# Patient Record
Sex: MALE | Race: WHITE | Employment: OTHER | URBAN - NONMETROPOLITAN AREA
[De-identification: names, ages, dates, MRNs, and addresses within clinical notes are randomized per-mention and may not be internally consistent; named-entity substitution may affect disease eponyms.]

---

## 2019-05-17 ENCOUNTER — HOSPITAL ENCOUNTER (EMERGENCY)
Age: 59
Discharge: HOME | End: 2019-05-17
Attending: EMERGENCY MEDICINE
Payer: COMMERCIAL

## 2019-05-17 ENCOUNTER — APPOINTMENT (OUTPATIENT)
Dept: GENERAL RADIOLOGY | Age: 59
End: 2019-05-17
Payer: COMMERCIAL

## 2019-05-17 VITALS
SYSTOLIC BLOOD PRESSURE: 166 MMHG | TEMPERATURE: 97.8 F | DIASTOLIC BLOOD PRESSURE: 81 MMHG | BODY MASS INDEX: 44.48 KG/M2 | RESPIRATION RATE: 16 BRPM | OXYGEN SATURATION: 96 % | HEIGHT: 65 IN | HEART RATE: 90 BPM | WEIGHT: 267 LBS

## 2019-05-17 DIAGNOSIS — R03.0 ELEVATED BLOOD PRESSURE READING: ICD-10-CM

## 2019-05-17 DIAGNOSIS — J40 WHEEZY BRONCHITIS: Primary | ICD-10-CM

## 2019-05-17 DIAGNOSIS — Z72.0 TOBACCO ABUSE: ICD-10-CM

## 2019-05-17 LAB
A/G RATIO: 1.2 (ref 1.1–2.2)
ALBUMIN SERPL-MCNC: 3.6 G/DL (ref 3.4–5)
ALP BLD-CCNC: 77 U/L (ref 40–129)
ALT SERPL-CCNC: 29 U/L (ref 10–40)
ANION GAP SERPL CALCULATED.3IONS-SCNC: 8 MMOL/L (ref 3–16)
AST SERPL-CCNC: 22 U/L (ref 15–37)
BASOPHILS ABSOLUTE: 0.1 K/UL (ref 0–0.2)
BASOPHILS RELATIVE PERCENT: 0.9 %
BILIRUB SERPL-MCNC: 0.5 MG/DL (ref 0–1)
BUN BLDV-MCNC: 17 MG/DL (ref 7–20)
CALCIUM SERPL-MCNC: 9 MG/DL (ref 8.3–10.6)
CHLORIDE BLD-SCNC: 105 MMOL/L (ref 99–110)
CO2: 28 MMOL/L (ref 21–32)
CREAT SERPL-MCNC: 0.8 MG/DL (ref 0.9–1.3)
EKG ATRIAL RATE: 77 BPM
EKG DIAGNOSIS: NORMAL
EKG P AXIS: 48 DEGREES
EKG P-R INTERVAL: 174 MS
EKG Q-T INTERVAL: 366 MS
EKG QRS DURATION: 74 MS
EKG QTC CALCULATION (BAZETT): 414 MS
EKG R AXIS: -49 DEGREES
EKG T AXIS: 39 DEGREES
EKG VENTRICULAR RATE: 77 BPM
EOSINOPHILS ABSOLUTE: 0.1 K/UL (ref 0–0.6)
EOSINOPHILS RELATIVE PERCENT: 1 %
GFR AFRICAN AMERICAN: >60
GFR NON-AFRICAN AMERICAN: >60
GLOBULIN: 3.1 G/DL
GLUCOSE BLD-MCNC: 101 MG/DL (ref 70–99)
HCT VFR BLD CALC: 49.8 % (ref 40.5–52.5)
HEMOGLOBIN: 16.5 G/DL (ref 13.5–17.5)
LYMPHOCYTES ABSOLUTE: 2.1 K/UL (ref 1–5.1)
LYMPHOCYTES RELATIVE PERCENT: 17.2 %
MCH RBC QN AUTO: 30.5 PG (ref 26–34)
MCHC RBC AUTO-ENTMCNC: 33.1 G/DL (ref 31–36)
MCV RBC AUTO: 92.2 FL (ref 80–100)
MONOCYTES ABSOLUTE: 0.7 K/UL (ref 0–1.3)
MONOCYTES RELATIVE PERCENT: 5.8 %
NEUTROPHILS ABSOLUTE: 9.1 K/UL (ref 1.7–7.7)
NEUTROPHILS RELATIVE PERCENT: 75.1 %
PDW BLD-RTO: 13.7 % (ref 12.4–15.4)
PLATELET # BLD: 198 K/UL (ref 135–450)
PMV BLD AUTO: 8.3 FL (ref 5–10.5)
POTASSIUM REFLEX MAGNESIUM: 4.2 MMOL/L (ref 3.5–5.1)
PRO-BNP: 64 PG/ML (ref 0–124)
RBC # BLD: 5.4 M/UL (ref 4.2–5.9)
SODIUM BLD-SCNC: 141 MMOL/L (ref 136–145)
TOTAL PROTEIN: 6.7 G/DL (ref 6.4–8.2)
TROPONIN: <0.01 NG/ML
WBC # BLD: 12.2 K/UL (ref 4–11)

## 2019-05-17 PROCEDURE — 6370000000 HC RX 637 (ALT 250 FOR IP): Performed by: EMERGENCY MEDICINE

## 2019-05-17 PROCEDURE — 36415 COLL VENOUS BLD VENIPUNCTURE: CPT

## 2019-05-17 PROCEDURE — 96374 THER/PROPH/DIAG INJ IV PUSH: CPT

## 2019-05-17 PROCEDURE — 99285 EMERGENCY DEPT VISIT HI MDM: CPT

## 2019-05-17 PROCEDURE — 84484 ASSAY OF TROPONIN QUANT: CPT

## 2019-05-17 PROCEDURE — 6360000002 HC RX W HCPCS: Performed by: EMERGENCY MEDICINE

## 2019-05-17 PROCEDURE — 93010 ELECTROCARDIOGRAM REPORT: CPT | Performed by: INTERNAL MEDICINE

## 2019-05-17 PROCEDURE — 93005 ELECTROCARDIOGRAM TRACING: CPT | Performed by: EMERGENCY MEDICINE

## 2019-05-17 PROCEDURE — 85025 COMPLETE CBC W/AUTO DIFF WBC: CPT

## 2019-05-17 PROCEDURE — 71046 X-RAY EXAM CHEST 2 VIEWS: CPT

## 2019-05-17 PROCEDURE — 80053 COMPREHEN METABOLIC PANEL: CPT

## 2019-05-17 PROCEDURE — 83880 ASSAY OF NATRIURETIC PEPTIDE: CPT

## 2019-05-17 RX ORDER — ALBUTEROL SULFATE 90 UG/1
2 AEROSOL, METERED RESPIRATORY (INHALATION) EVERY 6 HOURS PRN
Qty: 1 INHALER | Refills: 0 | Status: SHIPPED | OUTPATIENT
Start: 2019-05-17 | End: 2022-04-07 | Stop reason: SDUPTHER

## 2019-05-17 RX ORDER — METHYLPREDNISOLONE SODIUM SUCCINATE 125 MG/2ML
125 INJECTION, POWDER, LYOPHILIZED, FOR SOLUTION INTRAMUSCULAR; INTRAVENOUS ONCE
Status: COMPLETED | OUTPATIENT
Start: 2019-05-17 | End: 2019-05-17

## 2019-05-17 RX ORDER — IPRATROPIUM BROMIDE AND ALBUTEROL SULFATE 2.5; .5 MG/3ML; MG/3ML
2 SOLUTION RESPIRATORY (INHALATION) ONCE
Status: COMPLETED | OUTPATIENT
Start: 2019-05-17 | End: 2019-05-17

## 2019-05-17 RX ORDER — PREDNISONE 10 MG/1
TABLET ORAL
Qty: 30 TABLET | Refills: 0 | Status: SHIPPED | OUTPATIENT
Start: 2019-05-17 | End: 2019-07-10

## 2019-05-17 RX ADMIN — METHYLPREDNISOLONE SODIUM SUCCINATE 125 MG: 125 INJECTION, POWDER, FOR SOLUTION INTRAMUSCULAR; INTRAVENOUS at 12:53

## 2019-05-17 RX ADMIN — IPRATROPIUM BROMIDE AND ALBUTEROL SULFATE 2 AMPULE: .5; 3 SOLUTION RESPIRATORY (INHALATION) at 12:54

## 2019-05-17 ASSESSMENT — PAIN DESCRIPTION - DESCRIPTORS: DESCRIPTORS: PRESSURE

## 2019-05-17 ASSESSMENT — PAIN SCALES - GENERAL
PAINLEVEL_OUTOF10: 1
PAINLEVEL_OUTOF10: 0

## 2019-05-17 ASSESSMENT — PAIN DESCRIPTION - PAIN TYPE: TYPE: ACUTE PAIN

## 2019-05-17 ASSESSMENT — PAIN DESCRIPTION - LOCATION: LOCATION: CHEST

## 2019-05-17 NOTE — ED PROVIDER NOTES
[facility] DEPART[facility]      CHIEF COMPLAINT  Shortness of Breath (pt c/o SOB x4 weeks, states he has been theodora his S/O's breathing Tx and Z-pack but it has not helped, pt having audible wheezes, denies previous Hx COPD or CHF)       HISTORY OF PRESENT ILLNESS  Jayden Csaillas is a 66 y.o. male  who presents to the ED complaining of shortness of breath. There is a family history of CHF and he is worried that possibly he may have this. He has had some slight swelling in his legs. He states that it feels like his lungs are tight like something sitting on his chest.  No other chest pain. Patient states that he does smoke 2 packs a day of cigarettes. No known history of asthma or COPD. He has been coughing up green sputum. He has been more tired than usual.  He has used his wife's inhaler as well as took a Z-Brigido that his friend had but did not have any significant improvement after the antibiotic so came in today for further evaluation. Symptoms have been worsening over the past several weeks. No nausea or vomiting. No known fevers. No other complaints, modifying factors or associated symptoms. I have reviewed the following from the nursing documentation. Past Medical History:   Diagnosis Date    Kidney stone      Past Surgical History:   Procedure Laterality Date    CHOLECYSTECTOMY      CYSTOSCOPY  03/16/2011    cystoscopy left ureteroscopy, left retrograde, double J stent placement     History reviewed. No pertinent family history.   Social History     Socioeconomic History    Marital status:      Spouse name: Not on file    Number of children: Not on file    Years of education: Not on file    Highest education level: Not on file   Occupational History    Not on file   Social Needs    Financial resource strain: Not on file    Food insecurity:     Worry: Not on file     Inability: Not on file    Transportation needs:     Medical: Not on file     Non-medical: Not on file Tobacco Use    Smoking status: Current Every Day Smoker     Packs/day: 2.00     Years: 35.00     Pack years: 70.00    Smokeless tobacco: Never Used   Substance and Sexual Activity    Alcohol use: No    Drug use: No    Sexual activity: Not on file   Lifestyle    Physical activity:     Days per week: Not on file     Minutes per session: Not on file    Stress: Not on file   Relationships    Social connections:     Talks on phone: Not on file     Gets together: Not on file     Attends Pentecostalism service: Not on file     Active member of club or organization: Not on file     Attends meetings of clubs or organizations: Not on file     Relationship status: Not on file    Intimate partner violence:     Fear of current or ex partner: Not on file     Emotionally abused: Not on file     Physically abused: Not on file     Forced sexual activity: Not on file   Other Topics Concern    Not on file   Social History Narrative    Not on file     No current facility-administered medications for this encounter. Current Outpatient Medications   Medication Sig Dispense Refill    albuterol sulfate HFA (PROVENTIL HFA) 108 (90 Base) MCG/ACT inhaler Inhale 2 puffs into the lungs every 6 hours as needed for Wheezing 1 Inhaler 0    predniSONE (DELTASONE) 10 MG tablet Take 4 tablets daily x3 days by mouth, followed by 3 tablets daily x3 days by mouth, then 2 tablets daily x3 days by mouth, and then 1 tablet daily by mouth until gone. 30 tablet 0     Allergies   Allergen Reactions    Codeine Itching    Dilaudid [Hydromorphone Hcl]        REVIEW OF SYSTEMS  10 systems reviewed, pertinent positives per HPI otherwise noted to be negative. PHYSICAL EXAM  BP (!) 162/98   Pulse 70   Temp 97.8 °F (36.6 °C) (Oral)   Resp 18   Ht 5' 5\" (1.651 m)   Wt 267 lb (121.1 kg)   SpO2 100%   BMI 44.43 kg/m²    GENERAL APPEARANCE: Awake and alert. Cooperative. No acute distress. HENT: Normocephalic. Atraumatic.  Mucous membranes are moist.  No drooling or stridor. NECK: Supple. EYES: PERRL. EOM's grossly intact. HEART/CHEST: RRR. No murmurs. 2+ radial pulses bilaterally. LUNGS: Respirations unlabored. Diffuse expiratory wheezes throughout. Good air exchange. Speaking comfortably in full sentences. ABDOMEN: No tenderness. Soft. Non-distended. No masses. No organomegaly. No guarding or rebound. MUSCULOSKELETAL: Trace bilateral lower extremity edema. Compartments soft. No deformity. No tenderness in the extremities. All extremities neurovascularly intact. SKIN: Warm and dry. No acute rashes. NEUROLOGICAL: Alert and oriented. CN's 2-12 intact. No gross facial drooping. No gross focal deficits. PSYCHIATRIC: Normal mood and affect. LABS  I have reviewed all labs for this visit.    Results for orders placed or performed during the hospital encounter of 05/17/19   CBC Auto Differential   Result Value Ref Range    WBC 12.2 (H) 4.0 - 11.0 K/uL    RBC 5.40 4.20 - 5.90 M/uL    Hemoglobin 16.5 13.5 - 17.5 g/dL    Hematocrit 49.8 40.5 - 52.5 %    MCV 92.2 80.0 - 100.0 fL    MCH 30.5 26.0 - 34.0 pg    MCHC 33.1 31.0 - 36.0 g/dL    RDW 13.7 12.4 - 15.4 %    Platelets 379 770 - 024 K/uL    MPV 8.3 5.0 - 10.5 fL    Neutrophils % 75.1 %    Lymphocytes % 17.2 %    Monocytes % 5.8 %    Eosinophils % 1.0 %    Basophils % 0.9 %    Neutrophils # 9.1 (H) 1.7 - 7.7 K/uL    Lymphocytes # 2.1 1.0 - 5.1 K/uL    Monocytes # 0.7 0.0 - 1.3 K/uL    Eosinophils # 0.1 0.0 - 0.6 K/uL    Basophils # 0.1 0.0 - 0.2 K/uL   Comprehensive Metabolic Panel w/ Reflex to MG   Result Value Ref Range    Sodium 141 136 - 145 mmol/L    Potassium reflex Magnesium 4.2 3.5 - 5.1 mmol/L    Chloride 105 99 - 110 mmol/L    CO2 28 21 - 32 mmol/L    Anion Gap 8 3 - 16    Glucose 101 (H) 70 - 99 mg/dL    BUN 17 7 - 20 mg/dL    CREATININE 0.8 (L) 0.9 - 1.3 mg/dL    GFR Non-African American >60 >60    GFR African American >60 >60    Calcium 9.0 8.3 - 10.6 mg/dL    Total Protein 6.7 6.4 - 8.2 g/dL    Alb 3.6 3.4 - 5.0 g/dL    Albumin/Globulin Ratio 1.2 1.1 - 2.2    Total Bilirubin 0.5 0.0 - 1.0 mg/dL    Alkaline Phosphatase 77 40 - 129 U/L    ALT 29 10 - 40 U/L    AST 22 15 - 37 U/L    Globulin 3.1 g/dL   Troponin   Result Value Ref Range    Troponin <0.01 <0.01 ng/mL   Brain Natriuretic Peptide   Result Value Ref Range    Pro-BNP 64 0 - 124 pg/mL   EKG 12 Lead   Result Value Ref Range    Ventricular Rate 77 BPM    Atrial Rate 77 BPM    P-R Interval 174 ms    QRS Duration 74 ms    Q-T Interval 366 ms    QTc Calculation (Bazett) 414 ms    P Axis 48 degrees    R Axis -49 degrees    T Axis 39 degrees    Diagnosis       Normal sinus rhythmLeft axis deviationLow voltage QRSCannot rule out Inferior infarct , age undeterminedAbnormal ECG       ECG  The Ekg interpreted by me shows  normal sinus rhythm with a rate of 77  Axis is   Normal  QTc is  normal  Intervals and Durations are unremarkable. ST Segments: nonspecific changes  No prior ECG available for comparison. RADIOLOGY  Xr Chest Standard (2 Vw)    Result Date: 5/17/2019  EXAMINATION: TWO XRAY VIEWS OF THE CHEST 5/17/2019 1:05 pm COMPARISON: None. HISTORY: ORDERING SYSTEM PROVIDED HISTORY: dyspnea, wheezing TECHNOLOGIST PROVIDED HISTORY: Reason for exam:->dyspnea, wheezing Ordering Physician Provided Reason for Exam: short of breath for couple weeks, got progressively worse Acuity: Unknown Type of Exam: Initial FINDINGS: There is mild chronic pulmonary change. There is no acute consolidation or effusion. There is no pneumothorax. The mediastinal structures are unremarkable. The upper abdomen is unremarkable. The extrathoracic soft tissues are unremarkable. Chronic pulmonary change without acute cardiopulmonary process. ED COURSE/MDM  Patient seen and evaluated. Old records reviewed. Labs and imaging reviewed and results discussed with patient.       Patient presenting for evaluation of shortness of breath and exam is consistent with wheezing and likely bronchitis or component of underlying COPD that was previously not diagnosed. He has a significant 2 pack per day smoking history currently. Smoking cessation discussed. He also has a little blood pressure but no evidence of end-organ damage. EKG without acute ischemic changes. Chest x-ray with chronic pulmonary changes but no acute finding or confluent infiltrate noted. No pulmonary edema or evidence of CHF. At this time, patient has been given steroids and breathing treatments and has no hypoxia or significant increased work of breathing and he feel was appropriate to discharge home with close outpatient follow-up with his PCP for recheck. Patient verbalized understanding and agreement of this plan and all questions answered prior to discharge. I estimate there is LOW risk for PULMONARY EMBOLISM, ACUTE CORONARY SYNDROME, OR THORACIC AORTIC DISSECTION, thus I consider the discharge disposition reasonable. Ajit Hanson and I have discussed the diagnosis and risks, and we agree with discharging home to follow-up with their primary doctor. We also discussed returning to the Emergency Department immediately if new or worsening symptoms occur. We have discussed the symptoms which are most concerning (e.g., bloody sputum, fever, worsening pain or shortness of breath, vomiting) that necessitate immediate return. During the patient's ED course, the patient was given:  Medications   ipratropium-albuterol (DUONEB) nebulizer solution 2 ampule (2 ampules Inhalation Given 5/17/19 1254)   methylPREDNISolone sodium (SOLU-MEDROL) injection 125 mg (125 mg Intravenous Given 5/17/19 1253)        CLINICAL IMPRESSION  1. Wheezy bronchitis    2. Tobacco abuse    3. Elevated blood pressure reading        Blood pressure (!) 162/98, pulse 70, temperature 97.8 °F (36.6 °C), temperature source Oral, resp.  rate 18, height 5' 5\" (1.651 m), weight 267 lb (121.1 kg), SpO2 100 %. DISPOSITION  La Nena Nath was discharged to home in stable condition. Patient was given scripts for the following medications. I counseled patient how to take these medications. New Prescriptions    ALBUTEROL SULFATE HFA (PROVENTIL HFA) 108 (90 BASE) MCG/ACT INHALER    Inhale 2 puffs into the lungs every 6 hours as needed for Wheezing    PREDNISONE (DELTASONE) 10 MG TABLET    Take 4 tablets daily x3 days by mouth, followed by 3 tablets daily x3 days by mouth, then 2 tablets daily x3 days by mouth, and then 1 tablet daily by mouth until gone. Follow-up with:  Neli Kelly, APRN - CNP  [address] [facility],[address]  [phone]    Schedule an appointment as soon as possible for a visit in 2 days  For recheck      DISCLAIMER: This chart was created using Dragon dictation software. Efforts were made by me to ensure accuracy, however some errors may be present due to limitations of this technology and occasionally words are not transcribed correctly.         Hali Calzada MD  05/17/19 0037

## 2019-07-10 ENCOUNTER — OFFICE VISIT (OUTPATIENT)
Dept: FAMILY MEDICINE CLINIC | Age: 59
End: 2019-07-10
Payer: COMMERCIAL

## 2019-07-10 VITALS
DIASTOLIC BLOOD PRESSURE: 64 MMHG | SYSTOLIC BLOOD PRESSURE: 104 MMHG | OXYGEN SATURATION: 96 % | HEIGHT: 65 IN | HEART RATE: 86 BPM | WEIGHT: 277 LBS | BODY MASS INDEX: 46.15 KG/M2

## 2019-07-10 DIAGNOSIS — R73.9 HYPERGLYCEMIA: ICD-10-CM

## 2019-07-10 DIAGNOSIS — R06.00 DYSPNEA, UNSPECIFIED TYPE: ICD-10-CM

## 2019-07-10 DIAGNOSIS — F10.11 HISTORY OF ALCOHOL ABUSE: ICD-10-CM

## 2019-07-10 DIAGNOSIS — F32.9 REACTIVE DEPRESSION: Primary | ICD-10-CM

## 2019-07-10 DIAGNOSIS — Z12.5 SCREENING FOR PROSTATE CANCER: ICD-10-CM

## 2019-07-10 DIAGNOSIS — Z72.0 TOBACCO USE: ICD-10-CM

## 2019-07-10 DIAGNOSIS — F41.9 ANXIETY: ICD-10-CM

## 2019-07-10 DIAGNOSIS — R53.83 FATIGUE, UNSPECIFIED TYPE: ICD-10-CM

## 2019-07-10 DIAGNOSIS — N52.9 ERECTILE DYSFUNCTION, UNSPECIFIED ERECTILE DYSFUNCTION TYPE: ICD-10-CM

## 2019-07-10 DIAGNOSIS — R60.9 EDEMA, UNSPECIFIED TYPE: ICD-10-CM

## 2019-07-10 LAB
BASOPHILS ABSOLUTE: 0 K/UL (ref 0–0.2)
BASOPHILS RELATIVE PERCENT: 0.4 %
EOSINOPHILS ABSOLUTE: 0.2 K/UL (ref 0–0.6)
EOSINOPHILS RELATIVE PERCENT: 1.8 %
HCT VFR BLD CALC: 51.3 % (ref 40.5–52.5)
HEMOGLOBIN: 17 G/DL (ref 13.5–17.5)
LYMPHOCYTES ABSOLUTE: 2.1 K/UL (ref 1–5.1)
LYMPHOCYTES RELATIVE PERCENT: 21.4 %
MCH RBC QN AUTO: 30.8 PG (ref 26–34)
MCHC RBC AUTO-ENTMCNC: 33.1 G/DL (ref 31–36)
MCV RBC AUTO: 93.1 FL (ref 80–100)
MONOCYTES ABSOLUTE: 0.6 K/UL (ref 0–1.3)
MONOCYTES RELATIVE PERCENT: 6.5 %
NEUTROPHILS ABSOLUTE: 6.9 K/UL (ref 1.7–7.7)
NEUTROPHILS RELATIVE PERCENT: 69.9 %
PDW BLD-RTO: 14.1 % (ref 12.4–15.4)
PLATELET # BLD: 211 K/UL (ref 135–450)
PMV BLD AUTO: 8.6 FL (ref 5–10.5)
RBC # BLD: 5.51 M/UL (ref 4.2–5.9)
WBC # BLD: 9.8 K/UL (ref 4–11)

## 2019-07-10 PROCEDURE — 99204 OFFICE O/P NEW MOD 45 MIN: CPT | Performed by: NURSE PRACTITIONER

## 2019-07-10 PROCEDURE — 36415 COLL VENOUS BLD VENIPUNCTURE: CPT | Performed by: NURSE PRACTITIONER

## 2019-07-10 RX ORDER — ESCITALOPRAM OXALATE 10 MG/1
10 TABLET ORAL DAILY
Qty: 30 TABLET | Refills: 1 | Status: SHIPPED | OUTPATIENT
Start: 2019-07-10 | End: 2019-08-23 | Stop reason: ALTCHOICE

## 2019-07-10 NOTE — PROGRESS NOTES
Problems Sister      Past Surgical History:   Procedure Laterality Date    CHOLECYSTECTOMY      CYSTOSCOPY  03/16/2011    cystoscopy left ureteroscopy, left retrograde, double J stent placement     Social History     Socioeconomic History    Marital status:      Spouse name: Not on file    Number of children: Not on file    Years of education: Not on file    Highest education level: Not on file   Occupational History    Not on file   Social Needs    Financial resource strain: Not on file    Food insecurity:     Worry: Not on file     Inability: Not on file    Transportation needs:     Medical: Not on file     Non-medical: Not on file   Tobacco Use    Smoking status: Current Every Day Smoker     Packs/day: 2.00     Years: 35.00     Pack years: 70.00     Start date: 1974    Smokeless tobacco: Never Used   Substance and Sexual Activity    Alcohol use: No    Drug use: No    Sexual activity: Not on file   Lifestyle    Physical activity:     Days per week: Not on file     Minutes per session: Not on file    Stress: Not on file   Relationships    Social connections:     Talks on phone: Not on file     Gets together: Not on file     Attends Taoist service: Not on file     Active member of club or organization: Not on file     Attends meetings of clubs or organizations: Not on file     Relationship status: Not on file    Intimate partner violence:     Fear of current or ex partner: Not on file     Emotionally abused: Not on file     Physically abused: Not on file     Forced sexual activity: Not on file   Other Topics Concern    Not on file   Social History Narrative    Not on file     Current Outpatient Medications   Medication Sig Dispense Refill    albuterol sulfate HFA (PROVENTIL HFA) 108 (90 Base) MCG/ACT inhaler Inhale 2 puffs into the lungs every 6 hours as needed for Wheezing 1 Inhaler 0     No current facility-administered medications for this visit.          No changes in past medical

## 2019-07-11 LAB
A/G RATIO: 1.8 (ref 1.1–2.2)
ALBUMIN SERPL-MCNC: 4.1 G/DL (ref 3.4–5)
ALP BLD-CCNC: 74 U/L (ref 40–129)
ALT SERPL-CCNC: 28 U/L (ref 10–40)
ANION GAP SERPL CALCULATED.3IONS-SCNC: 11 MMOL/L (ref 3–16)
AST SERPL-CCNC: 19 U/L (ref 15–37)
BILIRUB SERPL-MCNC: 0.5 MG/DL (ref 0–1)
BUN BLDV-MCNC: 14 MG/DL (ref 7–20)
CALCIUM SERPL-MCNC: 9.2 MG/DL (ref 8.3–10.6)
CHLORIDE BLD-SCNC: 103 MMOL/L (ref 99–110)
CO2: 28 MMOL/L (ref 21–32)
CREAT SERPL-MCNC: 0.8 MG/DL (ref 0.9–1.3)
ESTIMATED AVERAGE GLUCOSE: 125.5 MG/DL
FOLATE: 5.33 NG/ML (ref 4.78–24.2)
GFR AFRICAN AMERICAN: >60
GFR NON-AFRICAN AMERICAN: >60
GLOBULIN: 2.3 G/DL
GLUCOSE BLD-MCNC: 92 MG/DL (ref 70–99)
HBA1C MFR BLD: 6 %
POTASSIUM SERPL-SCNC: 4.4 MMOL/L (ref 3.5–5.1)
PROSTATE SPECIFIC ANTIGEN: 1.48 NG/ML (ref 0–4)
SODIUM BLD-SCNC: 142 MMOL/L (ref 136–145)
TOTAL PROTEIN: 6.4 G/DL (ref 6.4–8.2)
TSH SERPL DL<=0.05 MIU/L-ACNC: 0.9 UIU/ML (ref 0.27–4.2)
VITAMIN B-12: 547 PG/ML (ref 211–911)

## 2019-07-19 ENCOUNTER — HOSPITAL ENCOUNTER (OUTPATIENT)
Dept: NON INVASIVE DIAGNOSTICS | Age: 59
Discharge: HOME | End: 2019-07-19
Payer: COMMERCIAL

## 2019-07-19 DIAGNOSIS — R60.9 EDEMA, UNSPECIFIED TYPE: ICD-10-CM

## 2019-07-19 DIAGNOSIS — R53.83 FATIGUE, UNSPECIFIED TYPE: ICD-10-CM

## 2019-07-19 DIAGNOSIS — R06.00 DYSPNEA, UNSPECIFIED TYPE: ICD-10-CM

## 2019-07-19 LAB
LV EF: 55 %
LVEF MODALITY: NORMAL

## 2019-07-19 PROCEDURE — 93306 TTE W/DOPPLER COMPLETE: CPT

## 2019-07-23 ASSESSMENT — ENCOUNTER SYMPTOMS
SHORTNESS OF BREATH: 1
EYES NEGATIVE: 1
GASTROINTESTINAL NEGATIVE: 1

## 2019-08-07 PROBLEM — R06.02 SOB (SHORTNESS OF BREATH): Status: ACTIVE | Noted: 2019-08-07

## 2019-08-07 NOTE — PROGRESS NOTES
Abdomen:  · No masses or tenderness  · Liver/Spleen: No Abnormalities Noted  Neurological/Psychiatric:  · Alert and oriented in all spheres  · Moves all extremities well  · Exhibits normal gait balance and coordination  · No abnormalities of mood, affect, memory, mentation, or behavior are noted    EKG 5/17/19  Normal sinus rhythmLeft axis deviationLow voltage QRSCannot rule out Inferior infarct , age undetermined    ECHO 7/19/19  Summary   Left ventricular systolic function is normal with ejection fraction   estimated at 55%. No regional wall motion abnormalities. There is mild concentric left ventricular hypertrophy. Grade I diastolic dysfunction with normal left ventricular filling pressure. Mild mitral regurgitation. Assessment:   Chest pain - atypical. Suspect GI  Fatigue - suspect sleep apnea  Smoking - cessation discussed     Plan:  Stress echo   Follow up PCP   Sleep study for fatigue   Continue current medications   Regular exercise and following a healthy diet encouraged   Will call with testing    This note was scribed in the presence of Dr. Lasha Bridges MD by Margo Hart RN     The scribes docuementation has been prepared under my direction and personally reviewed by me in its entirety. I confirm that the note above accurately reflects all work, treatment, procedures, and medical decision making performed by me. Dr. Lasha Bridges MD    Thank you for allowing me to participate in the care of this individual.      Lacy Posey.  Lamberto Velasquez M.D., [facility]

## 2019-08-08 ENCOUNTER — OFFICE VISIT (OUTPATIENT)
Dept: CARDIOLOGY CLINIC | Age: 59
End: 2019-08-08
Payer: COMMERCIAL

## 2019-08-08 VITALS
HEART RATE: 77 BPM | DIASTOLIC BLOOD PRESSURE: 76 MMHG | SYSTOLIC BLOOD PRESSURE: 124 MMHG | WEIGHT: 280 LBS | OXYGEN SATURATION: 95 % | BODY MASS INDEX: 46.65 KG/M2 | HEIGHT: 65 IN

## 2019-08-08 DIAGNOSIS — Z76.89 ESTABLISHING CARE WITH NEW DOCTOR, ENCOUNTER FOR: Primary | ICD-10-CM

## 2019-08-08 DIAGNOSIS — R07.9 CHEST PAIN, UNSPECIFIED TYPE: ICD-10-CM

## 2019-08-08 DIAGNOSIS — R06.02 SOB (SHORTNESS OF BREATH): ICD-10-CM

## 2019-08-08 PROCEDURE — 99244 OFF/OP CNSLTJ NEW/EST MOD 40: CPT | Performed by: INTERNAL MEDICINE

## 2019-08-08 PROCEDURE — 93000 ELECTROCARDIOGRAM COMPLETE: CPT | Performed by: INTERNAL MEDICINE

## 2019-08-08 NOTE — LETTER
[facility] [place]   Cardiac Consultation    Referring Provider:  ANICETO Peters CNP     Chief Complaint   Patient presents with    New Patient    Shortness of Breath    Chest Pain      #2 pain since Saturday    Fatigue        History of Present Illness:   Mr. Donte Maradiaga is a 66 y.o. male NEW consult patient to interventional cardiology, referred by Fela Middleton CNP for SOB. He was seen in the ED 5/17/19 for SOB, cough-green sputum. Cxray at that time showed Chronic pulmonary change without acute cardiopulmonary process. Treated with solumedrol and Duoneb, discharged to home. Today he reports he has been fatigued- no energy at all. He reports he has chest pain since 7/3/19. Pain is intermittent comes on mostly at night and has woke him from sleep. Pain is sharp in nature with radiation to left shoulder and back. Pain is not worse with activity. He also has intermittent SOB. He smokes 2 ppd. Past Medical History:   has a past medical history of Kidney stone. Surgical History:   has a past surgical history that includes Cholecystectomy and Cystoscopy (03/16/2011). Social History:   reports that he has been smoking. He started smoking about 45 years ago. He has a 70.00 pack-year smoking history. He has never used smokeless tobacco. He reports that he does not drink alcohol or use drugs. Family History:  family history includes Heart Disease in his mother; High Blood Pressure in his sister; Liver Disease in his father; No Known Problems in his sister; Other in his mother. Home Medications:  Prior to Admission medications    Medication Sig Start Date End Date Taking?  Authorizing Provider   escitalopram (LEXAPRO) 10 MG tablet Take 1 tablet by mouth daily 7/10/19  Yes ANICETO Peters CNP   albuterol sulfate HFA (PROVENTIL HFA) 108 (90 Base) MCG/ACT inhaler Inhale 2 puffs into the lungs every 6 hours as needed for Wheezing 5/17/19  Yes Nery Rivera MD        Allergies: Codeine and Dilaudid [hydromorphone hcl]     Review of Systems:   · Constitutional: there has been no unanticipated weight loss. There's been no change in energy level, sleep pattern, or activity level. · Eyes: No visual changes or diplopia. No scleral icterus. · ENT: No Headaches, hearing loss or vertigo. No mouth sores or sore throat. · Cardiovascular: Reviewed in HPI  · Respiratory: No cough or wheezing, no sputum production. No hematemesis. · Gastrointestinal: No abdominal pain, appetite loss, blood in stools. No change in bowel or bladder habits. · Genitourinary: No dysuria, trouble voiding, or hematuria. · Musculoskeletal:  No gait disturbance, weakness or joint complaints. · Integumentary: No rash or pruritis. · Neurological: No headache, diplopia, change in muscle strength, numbness or tingling. No change in gait, balance, coordination, mood, affect, memory, mentation, behavior. · Psychiatric: No anxiety, no depression. · Endocrine: No malaise, fatigue or temperature intolerance. No excessive thirst, fluid intake, or urination. No tremor. · Hematologic/Lymphatic: No abnormal bruising or bleeding, blood clots or swollen lymph nodes. · Allergic/Immunologic: No nasal congestion or hives. Physical Examination:    Vitals:    08/08/19 0857   BP: 124/76   Pulse: 77   SpO2: 95%        Constitutional and General Appearance: NAD   Respiratory:  · Normal excursion and expansion without use of accessory muscles  · Resp Auscultation: Normal breath sounds without dullness  Cardiovascular:  · The apical impulses not displaced  · Heart tones are crisp and normal  · Cervical veins are not engorged  · The carotid upstroke is normal in amplitude and contour without delay or bruit  · Normal S1S2, No S3, No Murmur  · Peripheral pulses are symmetrical and full  · There is no clubbing, cyanosis of the extremities.   · No edema  · Femoral Arteries: 2+ and equal  · Pedal Pulses: 2+ and equal   Abdomen:

## 2019-08-08 NOTE — PATIENT INSTRUCTIONS
Plan:  Will check stress echo in view of CP  See PCP for GERD   Sleep study for fatigue   Continue current medications   Regular exercise and following a healthy diet encouraged   Will call with testing

## 2019-08-22 ENCOUNTER — OFFICE VISIT (OUTPATIENT)
Dept: PULMONOLOGY | Age: 59
End: 2019-08-22
Payer: COMMERCIAL

## 2019-08-22 VITALS
WEIGHT: 275 LBS | HEART RATE: 80 BPM | OXYGEN SATURATION: 95 % | DIASTOLIC BLOOD PRESSURE: 84 MMHG | BODY MASS INDEX: 45.82 KG/M2 | HEIGHT: 65 IN | SYSTOLIC BLOOD PRESSURE: 138 MMHG

## 2019-08-22 DIAGNOSIS — Z87.891 PERSONAL HISTORY OF TOBACCO USE: ICD-10-CM

## 2019-08-22 DIAGNOSIS — R93.89 ABNORMAL CXR: ICD-10-CM

## 2019-08-22 DIAGNOSIS — R06.09 DOE (DYSPNEA ON EXERTION): ICD-10-CM

## 2019-08-22 DIAGNOSIS — G47.10 HYPERSOMNIA: ICD-10-CM

## 2019-08-22 DIAGNOSIS — G47.30 OBSERVED SLEEP APNEA: Primary | ICD-10-CM

## 2019-08-22 DIAGNOSIS — R53.83 FATIGUE, UNSPECIFIED TYPE: ICD-10-CM

## 2019-08-22 PROCEDURE — 99245 OFF/OP CONSLTJ NEW/EST HI 55: CPT | Performed by: INTERNAL MEDICINE

## 2019-08-22 RX ORDER — ALBUTEROL SULFATE 90 UG/1
2 AEROSOL, METERED RESPIRATORY (INHALATION) EVERY 6 HOURS PRN
Qty: 1 INHALER | Refills: 6 | Status: SHIPPED | OUTPATIENT
Start: 2019-08-22 | End: 2020-03-09 | Stop reason: ALTCHOICE

## 2019-08-22 RX ORDER — ZALEPLON 10 MG/1
10 CAPSULE ORAL ONCE
Qty: 1 CAPSULE | Refills: 0 | Status: SHIPPED | OUTPATIENT
Start: 2019-08-22 | End: 2019-08-22

## 2019-08-22 ASSESSMENT — SLEEP AND FATIGUE QUESTIONNAIRES
HOW LIKELY ARE YOU TO NOD OFF OR FALL ASLEEP IN A CAR, WHILE STOPPED FOR A FEW MINUTES IN TRAFFIC: 0
HOW LIKELY ARE YOU TO NOD OFF OR FALL ASLEEP WHILE SITTING QUIETLY AFTER LUNCH WITHOUT ALCOHOL: 0
HOW LIKELY ARE YOU TO NOD OFF OR FALL ASLEEP WHILE SITTING INACTIVE IN A PUBLIC PLACE: 1
HOW LIKELY ARE YOU TO NOD OFF OR FALL ASLEEP WHILE SITTING AND TALKING TO SOMEONE: 0
NECK CIRCUMFERENCE (INCHES): 19.5
ESS TOTAL SCORE: 9
HOW LIKELY ARE YOU TO NOD OFF OR FALL ASLEEP WHEN YOU ARE A PASSENGER IN A CAR FOR AN HOUR WITHOUT A BREAK: 1
HOW LIKELY ARE YOU TO NOD OFF OR FALL ASLEEP WHILE LYING DOWN TO REST IN THE AFTERNOON WHEN CIRCUMSTANCES PERMIT: 3
HOW LIKELY ARE YOU TO NOD OFF OR FALL ASLEEP WHILE SITTING AND READING: 1
HOW LIKELY ARE YOU TO NOD OFF OR FALL ASLEEP WHILE WATCHING TV: 3

## 2019-08-22 NOTE — LETTER
[address] Pulmonary, Critical Care, and Sleep  [address] [facility] [address] [address] [address]  Phone: [phone]  Fax: [phone]    August 22, 2019     Patient: Shara Jones   MR Number: [id number]   YOB: 1960   Date of Visit: 8/22/2019     Dear Dr. Lucinda López: Thank you for the request for consultation for Shara Jones to me for the evaluation. Below are the relevant portions of my assessment and plan of care. Assessment:       · Observed sleep apnea   · Hypersomnia   · Fatigue   · Dyspnea on exertion associated with cough and wheezes. Likely underlying COPD  · Abnormal CXR   · 45 pack year smoking   · Chantix caused him vivid dreams       Plan:       · PSG evaluate for sleep related breathing disorder. Treatment options were discussed with patient if PSG reveals OANH, including CPAP therapy, oral appliances, upper airway surgery and hypoglossal nerve stimulation. · Sonata 10 mg at time of in lab sleep tetsing- patient is worried might not be able to fall a sleep during the study  · Discussed with patient the pathophysiology of apnea. · Sleep hygiene  · Avoid sedatives, alcohol and caffeinated drinks at bed time. · No driving motorized vehicles or operating heavy machinery while fatigue, drowsy or sleepy. · Weight loss is also recommended as a long-term intervention. · Complications of OANH if not treated were discussed with patient patient to include systemic hypertension, pulmonary hypertension, cardiovascular morbidities, car accidents and all cause mortality. · CT chest without contrast   · PFTs and 6MW  · Trial of Spiriva Respimat: Two inhalations (5 mcg) once daily (maximum: 2 inhalations per 24 hours)  · Continue albuterol 2 puffs Q4-6 hrs PRN  · Smoking cessation counseling. Patient was advised to visit [email or web address] and call [email or web address] for assistance.  Will refer to [facility] smoking cessation program.

## 2019-08-22 NOTE — PROGRESS NOTES
Patient: Trino Brown Date: 2018   : 1957 Attending: Christian Crisostomo MD   60 year old male        Chief complaint: DANNY, hyperkalemia, hyponatremia     HPI (from initial consult):  60 year old male with a past medical history significant for NICMP/Jm syndrome CMP, valvular heart disease s/p LVAD on 5/3/17, chronic systolic HF, a fib, SVT, pulmonary HTN, and Asthma.     Was recently in for decompensated HF, hyponatremia (tolvaptan given prn), had thoracentesis, Chest tube then PleuRx catheter discharged to [facility] [place] 3/21   Has PEG in for poor nutrition  S Aureus driveline infection  Represents with SOB with decreased pulse ox, continued fatigue. Na 121 K 6.4 creat 2.4 from discharge creat 0.5 (was on Torsemide, KCl and Losartan)  D/c wt 53.6 today's wt 59.9 - given kayexalate, losartan held, IVF initially given now d/c'd    Recent Events/Subjective:  18: Did not wear his CPAP last night. Had a restless night. Still with SOB. Weight up again today. Reports compliance with FR.   18:  States he had a \"poor night\". Had anxiety attack and spent most of the night up in the chair. Otherwise, feeling the same. Still with SOB. Weight up 0.3 kg.  18: Up in chair. Reports that he had another hard night and didn't not sleep well. C/o anxiety, fatigue, and unchanged SOB. Weight up again.   18-  Up in chair, denies any CP fever nausea or vomiting. Remains on lasix drip and wt is slightly improved.  18- Wt up again. On lasix drip. His compliance with fluid restriction is inadequate  18: Sitting up in chair. Pt states he is \"not good. Didn't sleep at all\". On Lasix drip- diuresing well, net negative 3L and weight is down. No CP, N/V.   18: Sitting up in chair. Pt states his breathing \"could be better\", however overall is improved. Has some lightheadedness when up walking. Weight continues to come down. On Lasix drip.   18: Sitting up in chair. Pt states he didn't get any  Mother         copd    Liver Disease Father     High Blood Pressure Sister     No Known Problems Sister        Current Medications:    Current Outpatient Medications:     escitalopram (LEXAPRO) 10 MG tablet, Take 1 tablet by mouth daily, Disp: 30 tablet, Rfl: 1    albuterol sulfate HFA (PROVENTIL HFA) 108 (90 Base) MCG/ACT inhaler, Inhale 2 puffs into the lungs every 6 hours as needed for Wheezing, Disp: 1 Inhaler, Rfl: 0      REVIEW OF SYSTEMS:  Constitutional: Negative for fever  HENT: Negative for sore throat  Eyes: Negative for redness   Respiratory: + dyspnea, cough  Cardiovascular: Negative for chest pain  Gastrointestinal: Negative for vomiting, diarrhea   Genitourinary: Negative for hematuria   Musculoskeletal: Negative for arthralgias   Skin: Negative for rash  Neurological: Negative for syncope  Hematological: Negative for adenopathy  Psychiatric/Behavorial: Negative for anxiety      Objective:   PHYSICAL EXAM:    Blood pressure (!) 140/84, pulse 80, height 5' 5\" (1.651 m), weight 275 lb (124.7 kg), SpO2 95 %.' on RA  Gen: No distress. Obese. BMI of 45.76  Eyes: PERRL. No sclera icterus. No conjunctival injection. ENT: No discharge. Pharynx clear. Mallampati class IV. Neck: Trachea midline. No obvious mass. Neck circumference 19.5\"  Resp: No accessory muscle use. No crackles. No wheezes. No rhonchi. No dullness on percussion. Good air entry. CV: Regular rate. Regular rhythm. No murmur or rub. No edema. GI: Non-tender. Non-distended. No hernia. Skin: Warm and dry. No nodule on exposed extremities. Lymph: No cervical LAD. No supraclavicular LAD. M/S: No cyanosis. No joint deformity. No clubbing. Neuro: Awake. Alert. Moves all four extremities. Psych: Oriented x 3. No anxiety.          DATA reviewed by me:   Chest x-ray 5/17/2019 imaging reviewed by me and showed  R basilar ASD     TSH 0.9    Assessment:       · Observed sleep apnea   · Hypersomnia   · Fatigue   · Dyspnea on exertion sleep. On O2 NC. Breathing is \"about the same\". Still c/o intermittent dizziness.Getting more alkalotic with diuresis. Weight continues to come down. Edema improving  4/26/18- Still has SOB at night time, his wt is slowly improving. Sitting up in chair. On O2 NC.   4/27/18: Transferred out of the ICU. Feeling okay. Still with SOB (even at rest) and HORVATH. Weight up 0.5 kg. On lasix at 5 mg/hr   4/28/18: Transferred back to ICU with hypercapnic resp failure. Was on BiPAP. Alert, sitting up in chair, currently on 3L O2 NC. No SOB. c/o dizziness, worse when up walking.   4/29/18: Up in chair. Pt states he feels more short of breath this a.m. Was on BiPAP overnight. No CP.   4/30/18: c/o pain in left arm. Continues to feel short of breath, unchanged overall. Weight trending down.  5/1/18: Sitting up in chair. Has no complaints. SOB improved at this time. No CP, N/V.     Past Medical History:   Diagnosis Date   • Anesthesia complication     Anesthesia Awareness   • Anxiety    • Arthritis    • Asthma    • Asthma    • Atrial fibrillation with rapid ventricular response (CMS/HCC)    • Atrial flutter (CMS/HCC)     s/p direct current cardioversion   • Bifascicular block    • Bleeding nose 12/01/2017   • Cardiomyopathy (CMS/HCC)     EF 25 %, nonischemic   • Chest pain 11/2016    hospitalized with elevated Tropin levels   • Complication involving left ventricular assist device (LVAD) 07/13/2017    low flow, patient dizzy   • Congestive Heart Failure 11/2016    non-ischemic cardiomyopathy   • Dizziness    • Dyspnea on exertion    • Failed moderate sedation during procedure     anesthesia awareness   • Gastroesophageal reflux disease     severe GI bleed   • History of blood transfusion 2017    5 units along with 2 FFP due to GI bleed   • Hyperactive airway disease     uses inhalers and takes oral medication at HS   • ICD (implantable cardioverter-defibrillator) in place     Medtronic   • Implantable cardioverter-defibrillator (ICD)  associated with cough and wheezes. Likely underlying COPD  · Abnormal CXR   · 45 pack year smoking   · Chantix caused him vivid dreams       Plan:       · PSG evaluate for sleep related breathing disorder. Treatment options were discussed with patient if PSG reveals OANH, including CPAP therapy, oral appliances, upper airway surgery and hypoglossal nerve stimulation. · Sonata 10 mg at time of in lab sleep tetsing- patient is worried might not be able to fall a sleep during the study  · Discussed with patient the pathophysiology of apnea. · Sleep hygiene  · Avoid sedatives, alcohol and caffeinated drinks at bed time. · No driving motorized vehicles or operating heavy machinery while fatigue, drowsy or sleepy. · Weight loss is also recommended as a long-term intervention. · Complications of OANH if not treated were discussed with patient patient to include systemic hypertension, pulmonary hypertension, cardiovascular morbidities, car accidents and all cause mortality. · CT chest without contrast   · PFTs and 6MW  · Trial of Spiriva Respimat: Two inhalations (5 mcg) once daily (maximum: 2 inhalations per 24 hours)  · Continue albuterol 2 puffs Q4-6 hrs PRN  · Smoking cessation counseling. Patient was advised to visit smokefree. gov and call 1800QUITNOW for assistance.  Will refer to [address] [employer] smoking cessation program. 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 %   • Other and unspecified hyperlipidemia     dyslipidemia   • Other chronic pain     BACK, DEGENERATIVE SPINE   • Pleural effusion, left 11/2017   • Pneumonia    • PONV (postoperative nausea and vomiting)    • Pulmonary hypertension (CMS/HCC)     moderately severe   • S/P redo sternotomy, redo AVR (tissue), left ventricular assist device - heartware 5/3/2017   • SOB (shortness of breath)    • Staph infection 12/6/13    treated for 5 days with Bactroban (neg MRSA)   • Staphylococcus aureus bacteremia 04/2017    from SWAN catheter   • Warfarin anticoagulation    • Wears glasses        Past Surgical History:   Procedure Laterality Date   • Back surgery  1992    PARTIAL DISC REMOVAL   • Back surgery  1992    disectomy lower vertibrae   • Cardiac defibrillator placement  2012    Medtronic by Dr. Shaw   • Cardiac surgery  1995    aortic valve, [facility]. [facility]   • Cardiac surgery  9/2007    bi-vent icd medtronic, icd check 2009, 2010   • Cardiac surgery  05/03/2017    bioprosthetic AVR Dr. Kapoor   • Cardioversion  6/21/2011    X7   • Colonoscopy diagnostic  4/12/2011    normal colon, int hemorrhoids, f/u 10yrs, Dr Enriquez   • Ct angiogram heart  08/16/2012    left atrium is severely dilated. There are 4 pulmonary veins. The left atrial appendage is normal.   • Echo heart resting  03/26/2013    LVEF 39%   • Echo brian  08/16/2012    No ALBERTA or RAA clot. Successful transeptal puncture. No pericardial effusion post ranseptal procedure.   • Echocardiogram  01/31/2017   • Ep ablation  08/16/2012    Pulmonary veins isolation of Left Superior Pulmonary Vein. Procedure discontinued prematurely as it became clear that risks of further attempts at ablation greatly outweighted peotential benefi (this pateint is very unlikely to maintain sinus/Apaced rhythm regardless of intervention)  Severe atrial enlargement and electrical disorganization with a relatively stable cycle length rhythm in the high   • Hernia repair     • Icd generator change  7/29/2016    bi-V Medtronic   • Ir thoracentesis Left 11/27/2017    posterior. 750 cc removed serosangious fluid   • Left ventricular assist device  05/03/2017   • Neck surgery     • Right heart cath  12/08/2016   • Right heart cath  04/14/2017   • Sinus surgery  2005       ALLERGIES:   Allergen Reactions   • Pineda Aspirin [Aspirin]      Acute respiratory distress   • Hydralazine ANXIETY and SHORTNESS OF BREATH   • Prednisone      Affects coumadin, bleeds easily, made INR's rise very high   • Mold      Congestion   • Pollen      Congestion   • Amoxicillin RASH     Tolerates cephalosporins   • Entresto [Sacubitril-Valsartan] MYALGIA and Other (See Comments)     Tingling and hot feeling in fingers and toes.  Tolerated losartan in  past   • Spironolactone ARTHRALGIA     Breast tenderness 2012       Medications/Infusions:  Prescriptions Prior to Admission   Medication Sig Dispense Refill   • clonazePAM (KLONOPIN) 0.5 MG tablet Take 1 tablet by mouth 2 times daily. Breakfast and nightly before bed. May take one additional tablet QD PRN, not to exceed 3 tabs in 24 hours. 15 tablet 0   • oxyCODONE/APAP (PERCOCET) 5-325 MG per tablet Take 1 tablet by mouth 3 times daily. Take at breakfast, lunch, supper. May take one additional tablet QD PRN, not to exceed 4 tabs in 24 hours. 15 tablet 0   • Melatonin 3 MG Cap Take 3 mg by mouth nightly.     • WARFARIN - PHYSICIAN MONITORED 1 each every evening. Take by mouth nightly: 2.5mg on Friday, 2mg Sat-Thursday. Recheck INR on Mon 3/26 3 each 0   • testosterone 50 MG/5GM (1%) gel Apply 50mg topically to shoulder, upper arm, or abdomen daily. 150 g 1   • losartan (COZAAR) 25 MG tablet Take 0.5 tablets by mouth daily.     • torsemide (DEMADEX) 20 MG tablet Take 4 tablets by mouth daily.     • potassium chloride 20 MEQ Tab CR  Take 20 mEq by mouth daily.     • polyethylene glycol (GLYCOLAX, MIRALAX) packet Take 17 g by mouth daily as needed (constipation). 14 each 0   • acyclovir (ZOVIRAX) 200 MG capsule Take 2 capsules by mouth every 12 hours.     • DULoxetine (CYMBALTA) 30 MG capsule Take 2 capsules by mouth daily. 30 capsule 3   • lidocaine (LIDOCARE) 4 % patch Place 2 patches onto the skin daily.     • eplerenone (INSPRA) 50 MG tablet Take 0.5 tablets by mouth daily. 180 tablet 3   • levothyroxine (SYNTHROID, LEVOTHROID) 75 MCG tablet Take 1 tablet by mouth daily (before breakfast). 30 tablet 0   • ondansetron (ZOFRAN ODT) 4 MG disintegrating tablet Place 1 tablet onto the tongue every 12 hours as needed for Nausea. 20 tablet 0   • tamsulosin (FLOMAX) 0.4 MG Cap Take 1 capsule by mouth every evening. 30 capsule 6   • colchicine (COLCRYS) 0.6 MG tablet Take 1 tablet by mouth daily. 30 tablet 1   • fluticasone-salmeterol (ADVAIR DISKUS) 250-50 MCG/DOSE inhaler Inhale 1 puff into the lungs every 12 hours. 60 each 3   • albuterol 108 (90 Base) MCG/ACT inhaler INHALE 2 PUFFS AS DIRECTED 4 (FOUR) TIMES DAILY. (Patient taking differently: Inhale 2 puffs into the lungs every 4 hours as needed for Shortness of Breath. ) 18 g 4   • Magnesium Oxide 400 (241.3 Mg) MG Tab Take 1.5 tablets by mouth daily. (Patient taking differently: Take 200-400 mg by mouth as directed. Take 1 tablets(=400 MG) by mouth in the morning  Take ½ tablet(=200 MG) by mouth at bedtime.) 30 tablet 6   • clopidogrel (PLAVIX) 75 MG tablet Take 1 tablet by mouth daily. 90 tablet 2   • pantoprazole (PROTONIX) 40 MG tablet Take 1 tablet by mouth every 12 hours. 180 tablet 3   • vitamin - therapeutic multivitamins w/minerals (CENTRUM SILVER,THERA-M) Tab Take 1 tablet by mouth daily.     • acetaminophen (TYLENOL) 325 MG tablet Take 650 mg by mouth every 4 hours as needed for Pain. Dose: 2 tabs (= 650 mg)     • Misc. Devices (BED WEDGE) Misc Bed Wedge Pillow to be used as needed  to elevate head. 1 each 0       Scheduled:   • furosemide  80 mg Intravenous BID   • melatonin  3 mg Oral Nightly   • losartan  25 mg Oral Daily   • acyclovir  400 mg Oral 2 times per day   • clopidogrel  75 mg Oral Daily   • eplerenone  25 mg Oral Daily   • lactobacillus acidophilus  2 tablet Oral BID   • levothyroxine  125 mcg Oral QAM AC   • pantoprazole  40 mg Oral 2 times per day   • polyethylene glycol  17 g Oral Daily   • sertraline  50 mg Oral Daily   • tamsulosin  0.4 mg Oral Q Evening   • vitamin - therapeutic multivitamins w/minerals  1 tablet Oral Daily   • [START ON 5/8/2018] cephalexin  500 mg Oral 4x Daily   • triamcinolone   Topical BID   • mupirocin   Topical Daily   • nystatin  500,000 Units Swish & Swallow TID AC   • OLANZapine  5 mg Oral Nightly   • budesonide  0.25 mg Nebulization BID Resp   • albuterol-ipratropium 2.5 mg/0.5 mg  3 mL Nebulization 4x Daily Resp   • clotrimazole  10 mg Oral TID   • sodium chloride (PF)  10 mL Injection 3 times per day   • ceFAZolin  2,000 mg Intravenous 3 times per day   • WARFARIN - PHYSICIAN MONITORED 1 each  1 each Does not apply Q Evening   • lidocaine  2 patch Transdermal Daily   • lidocaine  1 patch Transdermal Nightly   • testosterone  5 g Transdermal Daily       Continuous Infusions:   • sodium chloride 0.9% infusion 10 mL/hr (05/01/18 7943)     Social History     Social History   • Marital status:      Spouse name: N/A   • Number of children: N/A   • Years of education: N/A     Occupational History   • Not on file.     Social History Main Topics   • Smoking status: Never Smoker   • Smokeless tobacco: Never Used   • Alcohol use No   • Drug use: No   • Sexual activity: Not on file     Other Topics Concern   • Caffeine Concern Yes     seldom     Social History Narrative   • No narrative on file       Family History   Problem Relation Age of Onset   • Gastrointestinal Mother      colitis   • Hypertension Mother    • High blood pressure Mother    •  Heart disease Mother    • Heart Father       at age 65   • Congenital Heart Disease Father    • Heart disease Father    • High blood pressure Brother    • Diabetes Brother    • High blood pressure Brother    • Heart disease Son    • Cancer Paternal Aunt      LUNG   • Diabetes Paternal Uncle    • No CAD Neg Hx    • No Sudden Cardiac Arrest Neg Hx        ROS: rest of ROS reviewed and negative except for what is in the HPI/Subjective.      Vital Last Value 24 Hour Range   Temperature 97.8 °F (36.6 °C) Temp  Min: 97.4 °F (36.3 °C)  Max: 98.3 °F (36.8 °C)   Pulse 96 Pulse  Min: 96  Max: 111   Respiratory 30 Resp  Min: 0  Max: 42   Blood Pressure 119/83 No Data Recorded   Art /89 No Data Recorded   Pulse Oximetry 92 % SpO2  Min: 92 %  Max: 100 %     Vital Today Admitted   Weight 60.8 kg Weight: 59.9 kg   Height N/A Height: 5' 7\" (170.2 cm)   BMI N/A BMI (Calculated): 20.73     Weight over the past 48 Hours:  Patient Vitals for the past 48 hrs:   Weight   18 0500 61.8 kg   18 0600 60.8 kg        Hemodynamics:      Last Value 24 Hour Range   CVP   No Data Recorded   PAS/PAD   No Data Recorded   PCWP   No Data Recorded   CO   No Data Recorded   CI   No Data Recorded   SVR   No Data Recorded   SV02   No Data Recorded     Intake/Output:    Last Stool Occurrence: 1 (medium, soft) (18 0900)    I/O this shift:  In: -   Out: 500 [Urine:500]    I/O last 3 completed shifts:  In: 2204 [P.O.:990; I.V.:340; NG/GT:874]  Out: 1275 [Urine:1275]      Intake/Output Summary (Last 24 hours) at 18 0941  Last data filed at 18 0800   Gross per 24 hour   Intake             1964 ml   Output             1775 ml   Net              189 ml         Physical Exam:  General: Alert, no acute distress.   HEENT: Head atraumatic, normocephalic.  Moist oral mucus membranes.  Sclera non-icteric.   Neck: Supple, no JVD.  Trachea midline.  Chest/Lungs: Non-labored. Symmetrical expansion. On O2 NC. Diminished breath sounds.    CVS: VAD tones   Abdomen: BS well heard. Soft, non tender, non distended.   Neuro: Alert and oriented x3. Answering questions appropriately. No focal neurological deficit.   Skin: Warm, dry. No rashes.   Extremities: No clubbing or cyanosis. + pedal edema      Laboratory Results:    Recent Labs  Lab 05/01/18  0455 05/01/18  0041 04/30/18  1716  04/30/18  0245  04/29/18  0430  04/26/18  0255  04/25/18  0515   SODIUM 134*  --   --   --  134*  --  134*  < > 134*  < > 135   POTASSIUM 3.8 3.9 3.5  < > 3.4  < > 3.4  < > 3.4  < > 3.2*   CHLORIDE 90*  --   --   --  88*  --  91*  < > 83*  < > 84*   CO2 41*  --   --   --  42*  --  40*  < > 48*  < > 53*   ANIONGAP 7*  --   --   --  7*  --  6*  < > 6*  < > <2*   BUN 20  --   --   --  27*  --  37*  < > 35*  < > 32*   CREATININE 0.51*  --   --   --  0.52*  --  0.63*  < > 0.65*  < > 0.61*   GFRA >90  --   --   --  >90  --  >90  < > >90  < > >90   GFRNA >90  --   --   --  >90  --  >90  < > >90  < > >90   GLUCOSE 152*  --   --   --  138*  --  112*  < > 132*  < > 119*   CALCIUM 8.6  --   --   --  8.4  --  8.4  < > 8.3*  < > 8.8   MG  --   --   --   --   --   --  2.5*  --  2.3  --  2.3   < > = values in this interval not displayed.        Recent Labs  Lab 04/30/18  0245 04/29/18  0430 04/28/18  0415 04/27/18  0700   WBC 7.9 6.7  --  7.6   HGB 7.9* 7.7* 6.8* 6.8*   HCT 27.1* 26.3* 23.6* 24.1*    217  --  269         Recent Labs  Lab 04/29/18  0705 04/28/18  2235 04/28/18  1745   APH 7.38 7.35 7.35   APO2 113* 124* 152*   APCO2 78* 81* 82*   AHCO3 46* 43* 44*       No results found    Invalid input(s): ANASCREENWIT    Urine Panel  No results found    Invalid input(s): SPGRAVITY, UUROBILINOGEN, ULEUKOCYTEESTERASE,  UCL      CXR (4/29/18):  FINDINGS: Prior sternotomy and cardiac valvular replacement. Moderate cardiomegaly. Vasculature is congested centrally similar to prior exam. Ventricular assist device is again identified. Implantable cardiac device overlies the left  hemithorax and appears stable.  The lung volumes are normal.  There is patchy opacity both lung bases obscuring the diaphragms  left greater than right with blunted lateral costophrenic sulcus similar to prior exam. There is no pneumothorax.   The regional skeleton is unremarkable.        IMPRESSION:  1. Persistent bibasal atelectasis or infiltrate with small effusion.  2. Persistent vascular congestion     CXR (5/1/18):  IMPRESSION: Stable exam with pulmonary venous hypertension, cardiomegaly, small pleural effusions, and left greater than the right lung base opacity.    Diagnosis/Plan:  · Acute kidney injury on CKD stage 2- prerenal physiology/cardiorenal. Creat peaked and improved. Low creat d/t low muscle mass  · Back on Losartan, tolerating  · Creat remains stable, tolerating diuresis  · Hyponatremia- hypervolemic and pain contributing.   · Continue FR  · Has hypothyroidism- on levothyroxine.   · Prn Tolvaptan in the past -dose of Tolvaptan 4/16, 4/18, 4/23. No current indication for tolvaptan.  · Acute on chronic hypercapnic respiratory failure  · Pulmonary following  · Chronic systolic HF s/p LVAD 5/3/17: Heart failure following.   · Will d/c Lasix drip and placed on scheduled IV bid.   · Edema improving, weight slowly trending down.  · Continue FR. Also has intake with TF, on low water flushes.  · Hypokalemia: secondary to diuresis.  Is on eplerenone, and has prn orders for supplementation.  · Staph Aureus Bacteremia: ID following. On abx.    Discussed with patient   Tamara Thomason PA-C        I have personally seen and examined the patient, agree with the history, physical exam, assessment and plan and have made appropriate addendums/modifications in the note above.     Eulalia Adames MD

## 2019-08-22 NOTE — COMMUNICATION BODY
Assessment:       · Observed sleep apnea   · Hypersomnia   · Fatigue   · Dyspnea on exertion associated with cough and wheezes. Likely underlying COPD  · Abnormal CXR   · 45 pack year smoking   · Chantix caused him vivid dreams       Plan:       · PSG evaluate for sleep related breathing disorder. Treatment options were discussed with patient if PSG reveals OANH, including CPAP therapy, oral appliances, upper airway surgery and hypoglossal nerve stimulation. · Sonata 10 mg at time of in lab sleep tetsing- patient is worried might not be able to fall a sleep during the study  · Discussed with patient the pathophysiology of apnea. · Sleep hygiene  · Avoid sedatives, alcohol and caffeinated drinks at bed time. · No driving motorized vehicles or operating heavy machinery while fatigue, drowsy or sleepy. · Weight loss is also recommended as a long-term intervention. · Complications of OANH if not treated were discussed with patient patient to include systemic hypertension, pulmonary hypertension, cardiovascular morbidities, car accidents and all cause mortality. · CT chest without contrast   · PFTs and 6MW  · Trial of Spiriva Respimat: Two inhalations (5 mcg) once daily (maximum: 2 inhalations per 24 hours)  · Continue albuterol 2 puffs Q4-6 hrs PRN  · Smoking cessation counseling. Patient was advised to visit [employer] go[employer] and call 1800QUITNOW for assistance.  Will refer to [facility] smoking cessation program.

## 2019-08-23 ENCOUNTER — OFFICE VISIT (OUTPATIENT)
Dept: FAMILY MEDICINE CLINIC | Age: 59
End: 2019-08-23
Payer: COMMERCIAL

## 2019-08-23 VITALS
BODY MASS INDEX: 46.15 KG/M2 | HEIGHT: 65 IN | SYSTOLIC BLOOD PRESSURE: 130 MMHG | OXYGEN SATURATION: 96 % | DIASTOLIC BLOOD PRESSURE: 86 MMHG | HEART RATE: 81 BPM | WEIGHT: 277 LBS

## 2019-08-23 DIAGNOSIS — R53.83 FATIGUE, UNSPECIFIED TYPE: ICD-10-CM

## 2019-08-23 DIAGNOSIS — F32.9 REACTIVE DEPRESSION: Primary | ICD-10-CM

## 2019-08-23 DIAGNOSIS — R06.00 DYSPNEA, UNSPECIFIED TYPE: ICD-10-CM

## 2019-08-23 DIAGNOSIS — Z72.0 TOBACCO USE: ICD-10-CM

## 2019-08-23 DIAGNOSIS — F41.9 ANXIETY: ICD-10-CM

## 2019-08-23 PROCEDURE — 99215 OFFICE O/P EST HI 40 MIN: CPT | Performed by: NURSE PRACTITIONER

## 2019-08-23 RX ORDER — HYDROXYZINE PAMOATE 25 MG/1
25 CAPSULE ORAL 3 TIMES DAILY PRN
Qty: 60 CAPSULE | Refills: 1 | Status: SHIPPED | OUTPATIENT
Start: 2019-08-23 | End: 2020-04-09 | Stop reason: ALTCHOICE

## 2019-08-23 RX ORDER — METHYLPREDNISOLONE 4 MG/1
TABLET ORAL
Qty: 1 KIT | Refills: 0 | Status: SHIPPED | OUTPATIENT
Start: 2019-08-23 | End: 2019-08-29

## 2019-08-23 RX ORDER — PAROXETINE HYDROCHLORIDE 40 MG/1
40 TABLET, FILM COATED ORAL EVERY EVENING
Qty: 30 TABLET | Refills: 1 | Status: SHIPPED | OUTPATIENT
Start: 2019-08-23 | End: 2020-01-02

## 2019-08-23 NOTE — PROGRESS NOTES
is instructed to report to the emergency room. Medication profile reviewed. Medication side effects and possible impairments from medications were discussed as applicable. Allergies were reviewed. Health maintenance was reviewed and updated as appropriate.

## 2019-08-28 ENCOUNTER — HOSPITAL ENCOUNTER (OUTPATIENT)
Dept: NON INVASIVE DIAGNOSTICS | Age: 59
Discharge: HOME | End: 2019-08-28
Payer: COMMERCIAL

## 2019-08-28 DIAGNOSIS — R07.9 CHEST PAIN, UNSPECIFIED TYPE: ICD-10-CM

## 2019-08-28 DIAGNOSIS — Z76.89 ESTABLISHING CARE WITH NEW DOCTOR, ENCOUNTER FOR: ICD-10-CM

## 2019-08-28 DIAGNOSIS — R06.02 SOB (SHORTNESS OF BREATH): ICD-10-CM

## 2019-08-28 LAB
LV EF: 60 %
LVEF MODALITY: NORMAL

## 2019-08-28 PROCEDURE — 93351 STRESS TTE COMPLETE: CPT

## 2019-09-03 ENCOUNTER — HOSPITAL ENCOUNTER (OUTPATIENT)
Dept: PULMONOLOGY | Age: 59
Discharge: HOME | End: 2019-09-03
Payer: COMMERCIAL

## 2019-09-03 ENCOUNTER — HOSPITAL ENCOUNTER (OUTPATIENT)
Dept: CT IMAGING | Age: 59
Discharge: HOME | End: 2019-09-03
Payer: COMMERCIAL

## 2019-09-03 DIAGNOSIS — R06.09 DOE (DYSPNEA ON EXERTION): ICD-10-CM

## 2019-09-03 DIAGNOSIS — R93.89 ABNORMAL CXR: ICD-10-CM

## 2019-09-03 LAB
DLCO %PRED: 74 %
DLCO PRED: NORMAL ML/MIN/MMHG
DLCO/VA %PRED: NORMAL %
DLCO/VA PRED: NORMAL ML/MIN/MMHG
DLCO/VA: NORMAL ML/MIN/MMHG
DLCO: NORMAL ML/MIN/MMHG
EXPIRATORY TIME-POST: NORMAL SEC
EXPIRATORY TIME: NORMAL SEC
FEF 25-75% %CHNG: NORMAL
FEF 25-75% %PRED-POST: NORMAL %
FEF 25-75% %PRED-PRE: NORMAL L/SEC
FEF 25-75% PRED: NORMAL L/SEC
FEF 25-75%-POST: NORMAL L/SEC
FEF 25-75%-PRE: NORMAL L/SEC
FEV1 %PRED-POST: 54 %
FEV1 %PRED-PRE: 51 %
FEV1 PRED: NORMAL L
FEV1-POST: NORMAL L
FEV1-PRE: NORMAL L
FEV1/FVC %PRED-POST: NORMAL %
FEV1/FVC %PRED-PRE: NORMAL %
FEV1/FVC PRED: NORMAL %
FEV1/FVC-POST: 59 %
FEV1/FVC-PRE: 61 %
FVC %PRED-POST: NORMAL L
FVC %PRED-PRE: NORMAL %
FVC PRED: NORMAL L
FVC-POST: NORMAL L
FVC-PRE: NORMAL L
GAW %PRED: NORMAL %
GAW PRED: NORMAL L/S/CMH2O
GAW: NORMAL L/S/CMH2O
IC %PRED: NORMAL %
IC PRED: NORMAL L
IC: NORMAL L
MEP: NORMAL
MIP: NORMAL
MVV %PRED-PRE: NORMAL %
MVV PRED: NORMAL L/MIN
MVV-PRE: NORMAL L/MIN
PEF %PRED-POST: NORMAL %
PEF %PRED-PRE: NORMAL L/SEC
PEF PRED: NORMAL L/SEC
PEF%CHNG: NORMAL
PEF-POST: NORMAL L/SEC
PEF-PRE: NORMAL L/SEC
RAW %PRED: NORMAL %
RAW PRED: NORMAL CMH2O/L/S
RAW: NORMAL CMH2O/L/S
RV %PRED: NORMAL %
RV PRED: NORMAL L
RV: NORMAL L
SVC %PRED: NORMAL %
SVC PRED: NORMAL L
SVC: NORMAL L
TLC %PRED: 83 %
TLC PRED: NORMAL L
TLC: NORMAL L
VA %PRED: NORMAL %
VA PRED: NORMAL L
VA: NORMAL L
VTG %PRED: NORMAL %
VTG PRED: NORMAL L
VTG: NORMAL L

## 2019-09-03 PROCEDURE — 71250 CT THORAX DX C-: CPT

## 2019-09-03 PROCEDURE — 94726 PLETHYSMOGRAPHY LUNG VOLUMES: CPT

## 2019-09-03 PROCEDURE — 94618 PULMONARY STRESS TESTING: CPT

## 2019-09-03 PROCEDURE — 6360000002 HC RX W HCPCS: Performed by: INTERNAL MEDICINE

## 2019-09-03 PROCEDURE — 94060 EVALUATION OF WHEEZING: CPT

## 2019-09-03 PROCEDURE — 94640 AIRWAY INHALATION TREATMENT: CPT

## 2019-09-03 PROCEDURE — 94729 DIFFUSING CAPACITY: CPT

## 2019-09-03 RX ORDER — ALBUTEROL SULFATE 2.5 MG/3ML
2.5 SOLUTION RESPIRATORY (INHALATION) ONCE
Status: COMPLETED | OUTPATIENT
Start: 2019-09-03 | End: 2019-09-03

## 2019-09-03 RX ADMIN — ALBUTEROL SULFATE 2.5 MG: 2.5 SOLUTION RESPIRATORY (INHALATION) at 15:22

## 2019-09-03 ASSESSMENT — PULMONARY FUNCTION TESTS
FEV1/FVC_POST: 59
FEV1_PERCENT_PREDICTED_POST: 54
FEV1_PERCENT_PREDICTED_PRE: 51
FEV1/FVC_PRE: 61

## 2019-09-11 PROCEDURE — G0444 DEPRESSION SCREEN ANNUAL: HCPCS | Performed by: NURSE PRACTITIONER

## 2019-09-11 ASSESSMENT — PATIENT HEALTH QUESTIONNAIRE - PHQ9
SUM OF ALL RESPONSES TO PHQ QUESTIONS 1-9: 18
10. IF YOU CHECKED OFF ANY PROBLEMS, HOW DIFFICULT HAVE THESE PROBLEMS MADE IT FOR YOU TO DO YOUR WORK, TAKE CARE OF THINGS AT HOME, OR GET ALONG WITH OTHER PEOPLE: 1
2. FEELING DOWN, DEPRESSED OR HOPELESS: 2
7. TROUBLE CONCENTRATING ON THINGS, SUCH AS READING THE NEWSPAPER OR WATCHING TELEVISION: 1
1. LITTLE INTEREST OR PLEASURE IN DOING THINGS: 3
9. THOUGHTS THAT YOU WOULD BE BETTER OFF DEAD, OR OF HURTING YOURSELF: 1
8. MOVING OR SPEAKING SO SLOWLY THAT OTHER PEOPLE COULD HAVE NOTICED. OR THE OPPOSITE, BEING SO FIGETY OR RESTLESS THAT YOU HAVE BEEN MOVING AROUND A LOT MORE THAN USUAL: 1
6. FEELING BAD ABOUT YOURSELF - OR THAT YOU ARE A FAILURE OR HAVE LET YOURSELF OR YOUR FAMILY DOWN: 1
4. FEELING TIRED OR HAVING LITTLE ENERGY: 3
SUM OF ALL RESPONSES TO PHQ QUESTIONS 1-9: 18
SUM OF ALL RESPONSES TO PHQ9 QUESTIONS 1 & 2: 5
3. TROUBLE FALLING OR STAYING ASLEEP: 3
5. POOR APPETITE OR OVEREATING: 3

## 2019-09-11 ASSESSMENT — ENCOUNTER SYMPTOMS
GASTROINTESTINAL NEGATIVE: 1
RESPIRATORY NEGATIVE: 1
EYES NEGATIVE: 1

## 2019-09-25 ENCOUNTER — OFFICE VISIT (OUTPATIENT)
Dept: FAMILY MEDICINE CLINIC | Age: 59
End: 2019-09-25
Payer: COMMERCIAL

## 2019-09-25 VITALS
SYSTOLIC BLOOD PRESSURE: 138 MMHG | DIASTOLIC BLOOD PRESSURE: 74 MMHG | OXYGEN SATURATION: 96 % | WEIGHT: 288 LBS | HEART RATE: 79 BPM | HEIGHT: 65 IN | BODY MASS INDEX: 47.98 KG/M2

## 2019-09-25 DIAGNOSIS — Z11.4 SCREENING FOR HIV (HUMAN IMMUNODEFICIENCY VIRUS): ICD-10-CM

## 2019-09-25 DIAGNOSIS — Z13.220 SCREENING FOR CHOLESTEROL LEVEL: ICD-10-CM

## 2019-09-25 DIAGNOSIS — R06.02 SOB (SHORTNESS OF BREATH): ICD-10-CM

## 2019-09-25 DIAGNOSIS — N52.9 ERECTILE DYSFUNCTION, UNSPECIFIED ERECTILE DYSFUNCTION TYPE: ICD-10-CM

## 2019-09-25 DIAGNOSIS — Z11.59 NEED FOR HEPATITIS C SCREENING TEST: ICD-10-CM

## 2019-09-25 DIAGNOSIS — F41.9 ANXIETY: ICD-10-CM

## 2019-09-25 DIAGNOSIS — F32.9 REACTIVE DEPRESSION: Primary | ICD-10-CM

## 2019-09-25 PROCEDURE — 99215 OFFICE O/P EST HI 40 MIN: CPT | Performed by: NURSE PRACTITIONER

## 2019-09-25 PROCEDURE — 94640 AIRWAY INHALATION TREATMENT: CPT | Performed by: NURSE PRACTITIONER

## 2019-09-25 PROCEDURE — G0444 DEPRESSION SCREEN ANNUAL: HCPCS | Performed by: NURSE PRACTITIONER

## 2019-09-25 PROCEDURE — 96372 THER/PROPH/DIAG INJ SC/IM: CPT | Performed by: NURSE PRACTITIONER

## 2019-09-25 RX ORDER — BUDESONIDE 0.5 MG/2ML
0.5 INHALANT ORAL ONCE
Status: COMPLETED | OUTPATIENT
Start: 2019-09-25 | End: 2019-09-25

## 2019-09-25 RX ORDER — ALBUTEROL SULFATE 2.5 MG/3ML
2.5 SOLUTION RESPIRATORY (INHALATION) ONCE
Status: COMPLETED | OUTPATIENT
Start: 2019-09-25 | End: 2019-09-25

## 2019-09-25 RX ORDER — SILDENAFIL CITRATE 20 MG/1
20 TABLET ORAL DAILY PRN
Qty: 7 TABLET | Refills: 2 | Status: SHIPPED | OUTPATIENT
Start: 2019-09-25 | End: 2020-04-09

## 2019-09-25 RX ORDER — ALBUTEROL SULFATE 2.5 MG/3ML
2.5 SOLUTION RESPIRATORY (INHALATION) EVERY 6 HOURS PRN
Qty: 25 VIAL | Refills: 1 | Status: SHIPPED | OUTPATIENT
Start: 2019-09-25 | End: 2021-09-09 | Stop reason: SDUPTHER

## 2019-09-25 RX ORDER — PREDNISONE 10 MG/1
TABLET ORAL
Qty: 53 TABLET | Refills: 0 | Status: SHIPPED | OUTPATIENT
Start: 2019-09-25 | End: 2019-12-17 | Stop reason: ALTCHOICE

## 2019-09-25 RX ORDER — BUDESONIDE 0.5 MG/2ML
0.5 INHALANT ORAL 2 TIMES DAILY
Status: DISCONTINUED | OUTPATIENT
Start: 2019-09-25 | End: 2019-09-25

## 2019-09-25 RX ORDER — BUPROPION HYDROCHLORIDE 150 MG/1
150 TABLET, EXTENDED RELEASE ORAL 2 TIMES DAILY
Qty: 60 TABLET | Refills: 2 | Status: SHIPPED | OUTPATIENT
Start: 2019-09-25 | End: 2020-04-06 | Stop reason: ALTCHOICE

## 2019-09-25 RX ADMIN — BUDESONIDE 500 MCG: 0.5 INHALANT ORAL at 17:27

## 2019-09-25 RX ADMIN — BUDESONIDE 500 MCG: 0.5 INHALANT ORAL at 18:05

## 2019-09-25 RX ADMIN — ALBUTEROL SULFATE 2.5 MG: 2.5 SOLUTION RESPIRATORY (INHALATION) at 17:24

## 2019-09-25 ASSESSMENT — PATIENT HEALTH QUESTIONNAIRE - PHQ9
3. TROUBLE FALLING OR STAYING ASLEEP: 2
10. IF YOU CHECKED OFF ANY PROBLEMS, HOW DIFFICULT HAVE THESE PROBLEMS MADE IT FOR YOU TO DO YOUR WORK, TAKE CARE OF THINGS AT HOME, OR GET ALONG WITH OTHER PEOPLE: 2
SUM OF ALL RESPONSES TO PHQ QUESTIONS 1-9: 17
4. FEELING TIRED OR HAVING LITTLE ENERGY: 3
6. FEELING BAD ABOUT YOURSELF - OR THAT YOU ARE A FAILURE OR HAVE LET YOURSELF OR YOUR FAMILY DOWN: 1
SUM OF ALL RESPONSES TO PHQ9 QUESTIONS 1 & 2: 5
7. TROUBLE CONCENTRATING ON THINGS, SUCH AS READING THE NEWSPAPER OR WATCHING TELEVISION: 1
1. LITTLE INTEREST OR PLEASURE IN DOING THINGS: 3
5. POOR APPETITE OR OVEREATING: 2
2. FEELING DOWN, DEPRESSED OR HOPELESS: 2
SUM OF ALL RESPONSES TO PHQ QUESTIONS 1-9: 17
9. THOUGHTS THAT YOU WOULD BE BETTER OFF DEAD, OR OF HURTING YOURSELF: 0
8. MOVING OR SPEAKING SO SLOWLY THAT OTHER PEOPLE COULD HAVE NOTICED. OR THE OPPOSITE, BEING SO FIGETY OR RESTLESS THAT YOU HAVE BEEN MOVING AROUND A LOT MORE THAN USUAL: 3

## 2019-10-11 DIAGNOSIS — R06.02 SOB (SHORTNESS OF BREATH): Primary | ICD-10-CM

## 2019-10-11 ASSESSMENT — ENCOUNTER SYMPTOMS
GASTROINTESTINAL NEGATIVE: 1
SHORTNESS OF BREATH: 1
EYES NEGATIVE: 1

## 2019-11-04 DIAGNOSIS — R06.02 SOB (SHORTNESS OF BREATH): ICD-10-CM

## 2019-11-22 ENCOUNTER — TELEPHONE (OUTPATIENT)
Dept: PULMONOLOGY | Age: 59
End: 2019-11-22

## 2019-11-22 NOTE — TELEPHONE ENCOUNTER
Patient cancelled appointment on 11/26/19 with  for 31-90/CT and PFT follow-up . Reason: Did not give one    Patient did not reschedule appointment. Patient declined to reschedule. Last OV   Assessment: 8/22/19      · Observed sleep apnea   · Hypersomnia   · Fatigue   · Dyspnea on exertion associated with cough and wheezes. Likely underlying COPD  · Abnormal CXR   · 45 pack year smoking   · Chantix caused him vivid dreams       Plan:       · PSG evaluate for sleep related breathing disorder. Treatment options were discussed with patient if PSG reveals OANH, including CPAP therapy, oral appliances, upper airway surgery and hypoglossal nerve stimulation. · Sonata 10 mg at time of in lab sleep tetsing- patient is worried might not be able to fall a sleep during the study  · Discussed with patient the pathophysiology of apnea. · Sleep hygiene  · Avoid sedatives, alcohol and caffeinated drinks at bed time. · No driving motorized vehicles or operating heavy machinery while fatigue, drowsy or sleepy. · Weight loss is also recommended as a long-term intervention. · Complications of OANH if not treated were discussed with patient patient to include systemic hypertension, pulmonary hypertension, cardiovascular morbidities, car accidents and all cause mortality. · CT chest without contrast   · PFTs and 6MW  · Trial of Spiriva Respimat: Two inhalations (5 mcg) once daily (maximum: 2 inhalations per 24 hours)  · Continue albuterol 2 puffs Q4-6 hrs PRN  · Smoking cessation counseling. Patient was advised to visit smoke[employer] gov and call [email or web address] for assistance.  Will refer to [facility] smoking cessation program.

## 2019-12-06 ENCOUNTER — APPOINTMENT (OUTPATIENT)
Dept: GENERAL RADIOLOGY | Age: 59
End: 2019-12-06
Payer: COMMERCIAL

## 2019-12-06 ENCOUNTER — APPOINTMENT (OUTPATIENT)
Dept: CT IMAGING | Age: 59
End: 2019-12-06
Payer: COMMERCIAL

## 2019-12-06 ENCOUNTER — HOSPITAL ENCOUNTER (EMERGENCY)
Age: 59
Discharge: HOME | End: 2019-12-06
Payer: COMMERCIAL

## 2019-12-06 VITALS
OXYGEN SATURATION: 96 % | HEART RATE: 90 BPM | BODY MASS INDEX: 44.48 KG/M2 | HEIGHT: 65 IN | RESPIRATION RATE: 20 BRPM | SYSTOLIC BLOOD PRESSURE: 154 MMHG | WEIGHT: 267 LBS | TEMPERATURE: 97.1 F | DIASTOLIC BLOOD PRESSURE: 87 MMHG

## 2019-12-06 DIAGNOSIS — M25.551 RIGHT HIP PAIN: Primary | ICD-10-CM

## 2019-12-06 PROCEDURE — 73552 X-RAY EXAM OF FEMUR 2/>: CPT

## 2019-12-06 PROCEDURE — 99283 EMERGENCY DEPT VISIT LOW MDM: CPT

## 2019-12-06 PROCEDURE — 73700 CT LOWER EXTREMITY W/O DYE: CPT

## 2019-12-06 PROCEDURE — 97161 PT EVAL LOW COMPLEX 20 MIN: CPT

## 2019-12-06 PROCEDURE — 97530 THERAPEUTIC ACTIVITIES: CPT

## 2019-12-06 PROCEDURE — 73562 X-RAY EXAM OF KNEE 3: CPT

## 2019-12-06 PROCEDURE — 6370000000 HC RX 637 (ALT 250 FOR IP): Performed by: PHYSICIAN ASSISTANT

## 2019-12-06 RX ORDER — OXYCODONE HYDROCHLORIDE AND ACETAMINOPHEN 5; 325 MG/1; MG/1
1 TABLET ORAL ONCE
Status: COMPLETED | OUTPATIENT
Start: 2019-12-06 | End: 2019-12-06

## 2019-12-06 RX ORDER — DIPHENHYDRAMINE HCL 25 MG
25 TABLET ORAL ONCE
Status: COMPLETED | OUTPATIENT
Start: 2019-12-06 | End: 2019-12-06

## 2019-12-06 RX ORDER — OXYCODONE HYDROCHLORIDE AND ACETAMINOPHEN 5; 325 MG/1; MG/1
1 TABLET ORAL EVERY 6 HOURS PRN
Qty: 12 TABLET | Refills: 0 | Status: SHIPPED | OUTPATIENT
Start: 2019-12-06 | End: 2019-12-09

## 2019-12-06 RX ORDER — DIPHENHYDRAMINE HCL 25 MG
25 CAPSULE ORAL EVERY 4 HOURS PRN
Qty: 25 CAPSULE | Refills: 0 | Status: SHIPPED | OUTPATIENT
Start: 2019-12-06 | End: 2019-12-16

## 2019-12-06 RX ADMIN — OXYCODONE HYDROCHLORIDE AND ACETAMINOPHEN 1 TABLET: 5; 325 TABLET ORAL at 14:55

## 2019-12-06 RX ADMIN — DIPHENHYDRAMINE HCL 25 MG: 25 TABLET ORAL at 14:55

## 2019-12-06 ASSESSMENT — ENCOUNTER SYMPTOMS
RESPIRATORY NEGATIVE: 1
GASTROINTESTINAL NEGATIVE: 1

## 2019-12-06 ASSESSMENT — PAIN DESCRIPTION - ORIENTATION: ORIENTATION: RIGHT

## 2019-12-06 ASSESSMENT — PAIN DESCRIPTION - LOCATION: LOCATION: HIP

## 2019-12-06 ASSESSMENT — PAIN DESCRIPTION - DESCRIPTORS: DESCRIPTORS: THROBBING

## 2019-12-06 ASSESSMENT — PAIN DESCRIPTION - PAIN TYPE: TYPE: ACUTE PAIN

## 2019-12-06 ASSESSMENT — PAIN DESCRIPTION - FREQUENCY: FREQUENCY: CONTINUOUS

## 2019-12-06 ASSESSMENT — PAIN SCALES - GENERAL: PAINLEVEL_OUTOF10: 10

## 2019-12-09 ENCOUNTER — OFFICE VISIT (OUTPATIENT)
Dept: ORTHOPEDIC SURGERY | Age: 59
End: 2019-12-09
Payer: COMMERCIAL

## 2019-12-09 ENCOUNTER — TELEPHONE (OUTPATIENT)
Dept: ORTHOPEDIC SURGERY | Age: 59
End: 2019-12-09

## 2019-12-09 VITALS
SYSTOLIC BLOOD PRESSURE: 143 MMHG | WEIGHT: 266.98 LBS | HEART RATE: 99 BPM | BODY MASS INDEX: 44.48 KG/M2 | DIASTOLIC BLOOD PRESSURE: 89 MMHG | HEIGHT: 65 IN

## 2019-12-09 DIAGNOSIS — M54.16 LUMBAR RADICULOPATHY: Primary | ICD-10-CM

## 2019-12-09 DIAGNOSIS — M47.816 SPONDYLOSIS WITHOUT MYELOPATHY OR RADICULOPATHY, LUMBAR REGION: ICD-10-CM

## 2019-12-09 DIAGNOSIS — M51.26 HNP (HERNIATED NUCLEUS PULPOSUS), LUMBAR: ICD-10-CM

## 2019-12-09 DIAGNOSIS — M51.36 DDD (DEGENERATIVE DISC DISEASE), LUMBAR: ICD-10-CM

## 2019-12-09 DIAGNOSIS — M54.50 PAIN OF LUMBAR SPINE: ICD-10-CM

## 2019-12-09 PROCEDURE — 99204 OFFICE O/P NEW MOD 45 MIN: CPT | Performed by: PHYSICIAN ASSISTANT

## 2019-12-09 RX ORDER — METHYLPREDNISOLONE 4 MG/1
TABLET ORAL
Qty: 1 KIT | Refills: 0 | Status: SHIPPED | OUTPATIENT
Start: 2019-12-09 | End: 2019-12-17 | Stop reason: ALTCHOICE

## 2019-12-17 ENCOUNTER — OFFICE VISIT (OUTPATIENT)
Dept: ORTHOPEDIC SURGERY | Age: 59
End: 2019-12-17
Payer: COMMERCIAL

## 2019-12-17 VITALS
DIASTOLIC BLOOD PRESSURE: 88 MMHG | HEART RATE: 86 BPM | WEIGHT: 266.98 LBS | HEIGHT: 65 IN | SYSTOLIC BLOOD PRESSURE: 136 MMHG | BODY MASS INDEX: 44.48 KG/M2

## 2019-12-17 DIAGNOSIS — M47.816 SPONDYLOSIS WITHOUT MYELOPATHY OR RADICULOPATHY, LUMBAR REGION: ICD-10-CM

## 2019-12-17 DIAGNOSIS — M54.16 LUMBAR RADICULOPATHY: Primary | ICD-10-CM

## 2019-12-17 DIAGNOSIS — M51.26 HNP (HERNIATED NUCLEUS PULPOSUS), LUMBAR: ICD-10-CM

## 2019-12-17 DIAGNOSIS — M51.36 DDD (DEGENERATIVE DISC DISEASE), LUMBAR: ICD-10-CM

## 2019-12-17 PROCEDURE — 99214 OFFICE O/P EST MOD 30 MIN: CPT | Performed by: PHYSICIAN ASSISTANT

## 2019-12-17 RX ORDER — IBUPROFEN 800 MG/1
800 TABLET ORAL 3 TIMES DAILY
Qty: 90 TABLET | Refills: 0 | Status: SHIPPED | OUTPATIENT
Start: 2019-12-17 | End: 2020-06-26 | Stop reason: SDUPTHER

## 2019-12-20 ENCOUNTER — TELEPHONE (OUTPATIENT)
Dept: ORTHOPEDIC SURGERY | Age: 59
End: 2019-12-20

## 2019-12-23 ENCOUNTER — TELEPHONE (OUTPATIENT)
Dept: ORTHOPEDIC SURGERY | Age: 59
End: 2019-12-23

## 2019-12-24 ENCOUNTER — HOSPITAL ENCOUNTER (OUTPATIENT)
Age: 59
Setting detail: OUTPATIENT SURGERY
Discharge: HOME | End: 2019-12-24
Attending: PHYSICAL MEDICINE & REHABILITATION | Admitting: PHYSICAL MEDICINE & REHABILITATION
Payer: COMMERCIAL

## 2019-12-24 VITALS
DIASTOLIC BLOOD PRESSURE: 62 MMHG | TEMPERATURE: 97 F | WEIGHT: 267 LBS | OXYGEN SATURATION: 94 % | BODY MASS INDEX: 44.48 KG/M2 | HEIGHT: 65 IN | RESPIRATION RATE: 23 BRPM | HEART RATE: 79 BPM | SYSTOLIC BLOOD PRESSURE: 117 MMHG

## 2019-12-24 PROCEDURE — 2500000003 HC RX 250 WO HCPCS: Performed by: PHYSICAL MEDICINE & REHABILITATION

## 2019-12-24 PROCEDURE — 7100000011 HC PHASE II RECOVERY - ADDTL 15 MIN: Performed by: PHYSICAL MEDICINE & REHABILITATION

## 2019-12-24 PROCEDURE — 6360000002 HC RX W HCPCS: Performed by: PHYSICAL MEDICINE & REHABILITATION

## 2019-12-24 PROCEDURE — 7100000010 HC PHASE II RECOVERY - FIRST 15 MIN: Performed by: PHYSICAL MEDICINE & REHABILITATION

## 2019-12-24 PROCEDURE — 6360000004 HC RX CONTRAST MEDICATION: Performed by: PHYSICAL MEDICINE & REHABILITATION

## 2019-12-24 PROCEDURE — 2580000003 HC RX 258: Performed by: PHYSICAL MEDICINE & REHABILITATION

## 2019-12-24 PROCEDURE — 3600000002 HC SURGERY LEVEL 2 BASE: Performed by: PHYSICAL MEDICINE & REHABILITATION

## 2019-12-24 PROCEDURE — 99152 MOD SED SAME PHYS/QHP 5/>YRS: CPT | Performed by: PHYSICAL MEDICINE & REHABILITATION

## 2019-12-24 PROCEDURE — 2709999900 HC NON-CHARGEABLE SUPPLY: Performed by: PHYSICAL MEDICINE & REHABILITATION

## 2019-12-24 RX ORDER — SODIUM CHLORIDE, SODIUM LACTATE, POTASSIUM CHLORIDE, CALCIUM CHLORIDE 600; 310; 30; 20 MG/100ML; MG/100ML; MG/100ML; MG/100ML
INJECTION, SOLUTION INTRAVENOUS CONTINUOUS
Status: DISCONTINUED | OUTPATIENT
Start: 2019-12-24 | End: 2019-12-24 | Stop reason: HOSPADM

## 2019-12-24 RX ORDER — FENTANYL CITRATE 50 UG/ML
INJECTION, SOLUTION INTRAMUSCULAR; INTRAVENOUS PRN
Status: DISCONTINUED | OUTPATIENT
Start: 2019-12-24 | End: 2019-12-24 | Stop reason: ALTCHOICE

## 2019-12-24 RX ORDER — MIDAZOLAM HYDROCHLORIDE 1 MG/ML
INJECTION INTRAMUSCULAR; INTRAVENOUS PRN
Status: DISCONTINUED | OUTPATIENT
Start: 2019-12-24 | End: 2019-12-24 | Stop reason: ALTCHOICE

## 2019-12-24 RX ADMIN — LIDOCAINE HYDROCHLORIDE 0.1 ML: 10 INJECTION, SOLUTION EPIDURAL; INFILTRATION; INTRACAUDAL; PERINEURAL at 08:50

## 2019-12-24 RX ADMIN — SODIUM CHLORIDE, POTASSIUM CHLORIDE, SODIUM LACTATE AND CALCIUM CHLORIDE: 600; 310; 30; 20 INJECTION, SOLUTION INTRAVENOUS at 08:49

## 2019-12-24 ASSESSMENT — PAIN SCALES - GENERAL
PAINLEVEL_OUTOF10: 2
PAINLEVEL_OUTOF10: 0

## 2019-12-24 ASSESSMENT — PAIN - FUNCTIONAL ASSESSMENT
PAIN_FUNCTIONAL_ASSESSMENT: 0-10
PAIN_FUNCTIONAL_ASSESSMENT: INTOLERABLE, UNABLE TO DO ANY ACTIVE OR PASSIVE ACTIVITIES

## 2019-12-24 ASSESSMENT — PAIN DESCRIPTION - PAIN TYPE: TYPE: CHRONIC PAIN

## 2019-12-24 ASSESSMENT — PAIN DESCRIPTION - DESCRIPTORS: DESCRIPTORS: SHARP;CONSTANT;THROBBING

## 2019-12-27 ENCOUNTER — TELEPHONE (OUTPATIENT)
Dept: ORTHOPEDIC SURGERY | Age: 59
End: 2019-12-27

## 2020-01-02 RX ORDER — PAROXETINE HYDROCHLORIDE 40 MG/1
TABLET, FILM COATED ORAL
Qty: 30 TABLET | Refills: 0 | Status: SHIPPED | OUTPATIENT
Start: 2020-01-02 | End: 2020-03-06 | Stop reason: SDUPTHER

## 2020-01-06 ENCOUNTER — OFFICE VISIT (OUTPATIENT)
Dept: FAMILY MEDICINE CLINIC | Age: 60
End: 2020-01-06
Payer: COMMERCIAL

## 2020-01-06 VITALS
WEIGHT: 279 LBS | SYSTOLIC BLOOD PRESSURE: 110 MMHG | DIASTOLIC BLOOD PRESSURE: 78 MMHG | BODY MASS INDEX: 46.48 KG/M2 | HEART RATE: 84 BPM | HEIGHT: 65 IN | OXYGEN SATURATION: 96 %

## 2020-01-06 PROBLEM — N52.9 ERECTILE DYSFUNCTION: Status: ACTIVE | Noted: 2020-01-06

## 2020-01-06 PROBLEM — M47.816 SPONDYLOSIS WITHOUT MYELOPATHY OR RADICULOPATHY, LUMBAR REGION: Status: ACTIVE | Noted: 2020-01-06

## 2020-01-06 PROBLEM — M51.369 DDD (DEGENERATIVE DISC DISEASE), LUMBAR: Status: ACTIVE | Noted: 2020-01-06

## 2020-01-06 PROBLEM — F41.9 ANXIETY: Status: ACTIVE | Noted: 2020-01-06

## 2020-01-06 PROBLEM — G47.10 HYPERSOMNIA: Status: ACTIVE | Noted: 2020-01-06

## 2020-01-06 PROBLEM — J44.9 CHRONIC OBSTRUCTIVE PULMONARY DISEASE (HCC): Status: ACTIVE | Noted: 2020-01-06

## 2020-01-06 PROBLEM — M51.36 DDD (DEGENERATIVE DISC DISEASE), LUMBAR: Status: ACTIVE | Noted: 2020-01-06

## 2020-01-06 PROBLEM — M54.50 LUMBAR SPINE PAIN: Status: ACTIVE | Noted: 2020-01-06

## 2020-01-06 PROBLEM — M51.26 HNP (HERNIATED NUCLEUS PULPOSUS), LUMBAR: Status: ACTIVE | Noted: 2020-01-06

## 2020-01-06 PROBLEM — J44.9 CHRONIC OBSTRUCTIVE PULMONARY DISEASE (HCC): Status: ACTIVE | Noted: 2019-09-03

## 2020-01-06 PROBLEM — M54.16 LUMBAR RADICULOPATHY: Status: ACTIVE | Noted: 2020-01-06

## 2020-01-06 PROBLEM — Z72.0 TOBACCO USE: Status: ACTIVE | Noted: 2020-01-06

## 2020-01-06 PROBLEM — R73.9 HYPERGLYCEMIA: Status: ACTIVE | Noted: 2020-01-06

## 2020-01-06 PROBLEM — G47.30 OBSERVED SLEEP APNEA: Status: ACTIVE | Noted: 2020-01-06

## 2020-01-06 PROBLEM — F32.9 REACTIVE DEPRESSION: Status: ACTIVE | Noted: 2020-01-06

## 2020-01-06 PROBLEM — F10.11 HISTORY OF ALCOHOL ABUSE: Status: ACTIVE | Noted: 2020-01-06

## 2020-01-06 PROBLEM — R53.83 FATIGUE: Status: ACTIVE | Noted: 2020-01-06

## 2020-01-06 PROCEDURE — 99214 OFFICE O/P EST MOD 30 MIN: CPT | Performed by: NURSE PRACTITIONER

## 2020-01-06 PROCEDURE — G0444 DEPRESSION SCREEN ANNUAL: HCPCS | Performed by: NURSE PRACTITIONER

## 2020-01-06 RX ORDER — GABAPENTIN 100 MG/1
100 CAPSULE ORAL 3 TIMES DAILY
Qty: 90 CAPSULE | Refills: 2 | Status: SHIPPED | OUTPATIENT
Start: 2020-01-06 | End: 2020-04-06 | Stop reason: ALTCHOICE

## 2020-01-06 ASSESSMENT — PATIENT HEALTH QUESTIONNAIRE - PHQ9
9. THOUGHTS THAT YOU WOULD BE BETTER OFF DEAD, OR OF HURTING YOURSELF: 0
SUM OF ALL RESPONSES TO PHQ QUESTIONS 1-9: 17
3. TROUBLE FALLING OR STAYING ASLEEP: 3
6. FEELING BAD ABOUT YOURSELF - OR THAT YOU ARE A FAILURE OR HAVE LET YOURSELF OR YOUR FAMILY DOWN: 0
10. IF YOU CHECKED OFF ANY PROBLEMS, HOW DIFFICULT HAVE THESE PROBLEMS MADE IT FOR YOU TO DO YOUR WORK, TAKE CARE OF THINGS AT HOME, OR GET ALONG WITH OTHER PEOPLE: 2
SUM OF ALL RESPONSES TO PHQ9 QUESTIONS 1 & 2: 5
4. FEELING TIRED OR HAVING LITTLE ENERGY: 2
5. POOR APPETITE OR OVEREATING: 3
7. TROUBLE CONCENTRATING ON THINGS, SUCH AS READING THE NEWSPAPER OR WATCHING TELEVISION: 2
SUM OF ALL RESPONSES TO PHQ QUESTIONS 1-9: 17
2. FEELING DOWN, DEPRESSED OR HOPELESS: 2
8. MOVING OR SPEAKING SO SLOWLY THAT OTHER PEOPLE COULD HAVE NOTICED. OR THE OPPOSITE, BEING SO FIGETY OR RESTLESS THAT YOU HAVE BEEN MOVING AROUND A LOT MORE THAN USUAL: 2
1. LITTLE INTEREST OR PLEASURE IN DOING THINGS: 3

## 2020-01-06 ASSESSMENT — ENCOUNTER SYMPTOMS
BACK PAIN: 1
SHORTNESS OF BREATH: 1
EYES NEGATIVE: 1
GASTROINTESTINAL NEGATIVE: 1

## 2020-01-06 NOTE — PROGRESS NOTES
Subjective:     Patient Name: Sujit Baldwin is a 64 y.o. male. Chief Complaint   Patient presents with    Depression     following up from increasing pt wellbutrin, pt said with everything going on with his back he is not sleepig pt been out of work for about 5 weeks pt said his anxiety is through the roof     Back Pain     pt wanted to let you know his pain got worse he is seeing dr Ayden Estes has injections scheduled for tomorrow        HPI  Mood Disorder:  Patient presents for follow-up of depression and anxiety disorder. Current complaints include: See PHQ9 below . He denies any other symptoms, increased use of drugs or alcohol and suicidal thoughts or behavior. Symptoms/signs of lev: none. External stressors: nothing new. Current treatment includes: Paxil-40 mg daily, Wellbutrin- mg twice daily and Vistaril 25 mg 3 times daily as needed. Medication side effects: none. Wellbutrin  mg twice a day was added at the last office visit of 9/25/2019    PHQ-9  1/6/2020 9/25/2019 9/11/2019   Little interest or pleasure in doing things 3 3 3   Feeling down, depressed, or hopeless 2 2 2   Trouble falling or staying asleep, or sleeping too much 3 2 3   Feeling tired or having little energy 2 3 3   Poor appetite or overeating 3 2 3   Feeling bad about yourself - or that you are a failure or have let yourself or your family down 0 1 1   Trouble concentrating on things, such as reading the newspaper or watching television 2 1 1   Moving or speaking so slowly that other people could have noticed. Or the opposite - being so fidgety or restless that you have been moving around a lot more than usual 2 3 1   Thoughts that you would be better off dead, or of hurting yourself in some way 0 0 1   PHQ-2 Score 5 5 5   PHQ-9 Total Score 17 17 18   If you checked off any problems, how difficult have these problems made it for you to do your work, take care of things at home, or get along with other people?  2 2 1 Sister     No Known Problems Sister      Past Surgical History:   Procedure Laterality Date    CHOLECYSTECTOMY      CYSTOSCOPY  03/16/2011    cystoscopy left ureteroscopy, left retrograde, double J stent placement    PAIN MANAGEMENT PROCEDURE Right 12/24/2019    RIGHT LUMBAR FIVE SACRAL ONE TRANSFORAMINAL EPIDURAL STEROID INJECTION SITE CONFIRMED BY FLUOROSCOPY performed by Jam Hernández MD at [address] History     Socioeconomic History    Marital status:      Spouse name: Not on file    Number of children: Not on file    Years of education: Not on file    Highest education level: Not on file   Occupational History    Not on file   Social Needs    Financial resource strain: Not on file    Food insecurity:     Worry: Not on file     Inability: Not on file    Transportation needs:     Medical: Not on file     Non-medical: Not on file   Tobacco Use    Smoking status: Current Every Day Smoker     Packs/day: 2.00     Years: 35.00     Pack years: 70.00     Start date: 1974    Smokeless tobacco: Never Used   Substance and Sexual Activity    Alcohol use: No    Drug use: Not Currently     Comment: hx of drug use    Sexual activity: Not on file   Lifestyle    Physical activity:     Days per week: Not on file     Minutes per session: Not on file    Stress: Not on file   Relationships    Social connections:     Talks on phone: Not on file     Gets together: Not on file     Attends Worship service: Not on file     Active member of club or organization: Not on file     Attends meetings of clubs or organizations: Not on file     Relationship status: Not on file    Intimate partner violence:     Fear of current or ex partner: Not on file     Emotionally abused: Not on file     Physically abused: Not on file     Forced sexual activity: Not on file   Other Topics Concern    Not on file   Social History Narrative    Not on file     Current Outpatient Medications   Medication Sig Dispense Refill    PARoxetine (PAXIL) 40 MG tablet TAKE ONE TABLET BY MOUTH EVERY EVENING 30 tablet 0    ibuprofen (ADVIL;MOTRIN) 800 MG tablet Take 1 tablet by mouth 3 times daily 90 tablet 0    tiotropium (SPIRIVA RESPIMAT) 2.5 MCG/ACT AERS inhaler Inhale 2 puffs into the lungs daily 1 Inhaler 11    buPROPion (WELLBUTRIN SR) 150 MG extended release tablet Take 1 tablet by mouth 2 times daily 60 tablet 2    sildenafil (REVATIO) 20 MG tablet Take 1 tablet by mouth daily as needed (erectile dysfunction) 7 tablet 2    albuterol (PROVENTIL) (2.5 MG/3ML) 0.083% nebulizer solution Take 3 mLs by nebulization every 6 hours as needed for Wheezing or Shortness of Breath 25 vial 1    ipratropium (ATROVENT) 0.02 % nebulizer solution Take 2.5 mLs by nebulization 4 times daily as needed for Wheezing (short of breath) 2.5 mL 1    hydrOXYzine (VISTARIL) 25 MG capsule Take 1 capsule by mouth 3 times daily as needed for Anxiety 60 capsule 1    albuterol sulfate HFA (PROVENTIL HFA) 108 (90 Base) MCG/ACT inhaler Inhale 2 puffs into the lungs every 6 hours as needed for Wheezing or Shortness of Breath 1 Inhaler 6    albuterol sulfate HFA (PROVENTIL HFA) 108 (90 Base) MCG/ACT inhaler Inhale 2 puffs into the lungs every 6 hours as needed for Wheezing 1 Inhaler 0     No current facility-administered medications for this visit. No changes in past medical history, past surgical history, social history, orfamily history were noted during the patient encounter unless specifically listed above. All updates of past medical history, past surgical history, social history, or family history were reviewed personally by me duringthe office visit. All problems listed in the assessment are stable unless noted otherwise. Medication profile reviewed personally by me during the office visit. Medication side effects and possible impairments frommedications were discussed as applicable.      Objective:       Physical Exam  Vitals signs submandibular adenopathy. Left side of head: No submandibular adenopathy. Cervical: No cervical adenopathy. Skin:     General: Skin is warm and dry. Findings: No lesion or rash. Neurological:      Mental Status: He is alert and oriented to person, place, and time. Gait: Gait normal.   Psychiatric:         Speech: Speech normal.         Behavior: Behavior normal.         Thought Content: Thought content normal.         Judgment: Judgment normal.         /78 (Site: Left Upper Arm, Position: Sitting, Cuff Size: Large Adult)   Pulse 84   Ht 5' 5\" (1.651 m)   Wt 279 lb (126.6 kg)   SpO2 96%   BMI 46.43 kg/m²   Body mass index is 46.43 kg/m². BP Readings from Last 2 Encounters:   01/06/20 110/78   12/24/19 117/62       Wt Readings from Last 3 Encounters:   01/06/20 279 lb (126.6 kg)   12/24/19 267 lb (121.1 kg)   12/17/19 266 lb 15.6 oz (121.1 kg)       Lab Review   No visits with results within 2 Month(s) from this visit. Latest known visit with results is:   Hospital Outpatient Visit on 09/03/2019   Component Date Value    FEV1 %Pred-Pre 09/03/2019 51     FEV1 %Pred-Post 09/03/2019 54     FEV1/FVC-Pre 09/03/2019 61     FEV1/FVC-Post 09/03/2019 59     DLCO %Pred 09/03/2019 74     TLC %Pred 09/03/2019 83        No results found for this visit on 01/06/20. Assessment:       1. Chronic obstructive pulmonary disease, unspecified COPD type ([address]    2. Reactive depression    3. Anxiety    4. Tobacco use    5. Lumbar radiculopathy    6. HNP (herniated nucleus pulposus), lumbar    7. Spondylosis without myelopathy or radiculopathy, lumbar region    8. DDD (degenerative disc disease), lumbar    9. Lumbar spine pain        No results found for this visit on 01/06/20. Plan:       Shari Castillo was seen today for depression and back pain.     Diagnoses and all orders for this visit:    Chronic obstructive pulmonary disease, unspecified COPD type ([address]  Since states that his dyspnea has significantly improved since using the Spiriva daily  He did get a 90-day supply from patient assistance however he has to apply for Medicaid and if denied a copy of the denial letter will be sent to the patient assistance program so that he can continue receiving this medication since it has made a significant difference    Reactive depression  Patient will continue on the Wellbutrin and Paxil as prescribed  Educated if patient develops SI/HI/lev to call 911 or go to ER. Discussed use, benefit, risks and side effects of prescribed medications. Barriers to compliance discussed. All patient questions answered. Pt voiced understanding. Anxiety  This is still a significant issue for the patient. This has always been an issue but is especially increased right now because he has been out of work for the past 5 weeks and is having increased lumbar spine pain   Will start  -     gabapentin (NEURONTIN) 100 MG capsule; Take 1 capsule by mouth 3 times daily for 90 days. Intended supply: 90 days  Patient warned of potential sedative effects of medication and to refrain from driving, operating machinery, or participating in any activities that may require her full attention until she know the effects of the medications, call with any side effects or concerns with the medication. Tobacco use  he is extensively counseled at today's visit regarding the hazards of continued use of tobacco products. Encouraged cessation of tobacco use to prevent medical conditions such as COPD, heart disease, lung cancer as well as increasing his/her risk for numerous other debilitating medical conditions. Continued tobacco use can lead to these aforementioned conditions as well as death. Patient verbalized full understanding of risks and consequences of continued tobacco use. Methods and suggestions for discontinuing use are discussed individually and at length with the patient. he is most strongly urged to consider

## 2020-01-07 ENCOUNTER — HOSPITAL ENCOUNTER (OUTPATIENT)
Age: 60
Setting detail: OUTPATIENT SURGERY
Discharge: HOME | End: 2020-01-07
Attending: PHYSICAL MEDICINE & REHABILITATION | Admitting: PHYSICAL MEDICINE & REHABILITATION
Payer: COMMERCIAL

## 2020-01-07 VITALS
WEIGHT: 279 LBS | SYSTOLIC BLOOD PRESSURE: 133 MMHG | BODY MASS INDEX: 46.48 KG/M2 | OXYGEN SATURATION: 92 % | TEMPERATURE: 97.9 F | HEART RATE: 88 BPM | DIASTOLIC BLOOD PRESSURE: 84 MMHG | RESPIRATION RATE: 16 BRPM | HEIGHT: 65 IN

## 2020-01-07 PROCEDURE — 2709999900 HC NON-CHARGEABLE SUPPLY: Performed by: PHYSICAL MEDICINE & REHABILITATION

## 2020-01-07 PROCEDURE — 99152 MOD SED SAME PHYS/QHP 5/>YRS: CPT | Performed by: PHYSICAL MEDICINE & REHABILITATION

## 2020-01-07 PROCEDURE — 2580000003 HC RX 258: Performed by: PHYSICAL MEDICINE & REHABILITATION

## 2020-01-07 PROCEDURE — 2500000003 HC RX 250 WO HCPCS: Performed by: PHYSICAL MEDICINE & REHABILITATION

## 2020-01-07 PROCEDURE — 6360000002 HC RX W HCPCS: Performed by: PHYSICAL MEDICINE & REHABILITATION

## 2020-01-07 PROCEDURE — 7100000011 HC PHASE II RECOVERY - ADDTL 15 MIN: Performed by: PHYSICAL MEDICINE & REHABILITATION

## 2020-01-07 PROCEDURE — 6360000004 HC RX CONTRAST MEDICATION: Performed by: PHYSICAL MEDICINE & REHABILITATION

## 2020-01-07 PROCEDURE — 3600000002 HC SURGERY LEVEL 2 BASE: Performed by: PHYSICAL MEDICINE & REHABILITATION

## 2020-01-07 PROCEDURE — 7100000010 HC PHASE II RECOVERY - FIRST 15 MIN: Performed by: PHYSICAL MEDICINE & REHABILITATION

## 2020-01-07 RX ORDER — MIDAZOLAM HYDROCHLORIDE 1 MG/ML
INJECTION INTRAMUSCULAR; INTRAVENOUS PRN
Status: DISCONTINUED | OUTPATIENT
Start: 2020-01-07 | End: 2020-01-07 | Stop reason: ALTCHOICE

## 2020-01-07 RX ORDER — LIDOCAINE HYDROCHLORIDE 10 MG/ML
INJECTION, SOLUTION EPIDURAL; INFILTRATION; INTRACAUDAL; PERINEURAL PRN
Status: DISCONTINUED | OUTPATIENT
Start: 2020-01-07 | End: 2020-01-07 | Stop reason: ALTCHOICE

## 2020-01-07 RX ORDER — BUPIVACAINE HYDROCHLORIDE 5 MG/ML
INJECTION, SOLUTION EPIDURAL; INTRACAUDAL PRN
Status: DISCONTINUED | OUTPATIENT
Start: 2020-01-07 | End: 2020-01-07 | Stop reason: ALTCHOICE

## 2020-01-07 RX ORDER — SODIUM CHLORIDE, SODIUM LACTATE, POTASSIUM CHLORIDE, CALCIUM CHLORIDE 600; 310; 30; 20 MG/100ML; MG/100ML; MG/100ML; MG/100ML
INJECTION, SOLUTION INTRAVENOUS CONTINUOUS
Status: DISCONTINUED | OUTPATIENT
Start: 2020-01-07 | End: 2020-01-07 | Stop reason: HOSPADM

## 2020-01-07 RX ORDER — BETAMETHASONE SODIUM PHOSPHATE AND BETAMETHASONE ACETATE 3; 3 MG/ML; MG/ML
INJECTION, SUSPENSION INTRA-ARTICULAR; INTRALESIONAL; INTRAMUSCULAR; SOFT TISSUE PRN
Status: DISCONTINUED | OUTPATIENT
Start: 2020-01-07 | End: 2020-01-07 | Stop reason: ALTCHOICE

## 2020-01-07 RX ADMIN — SODIUM CHLORIDE, POTASSIUM CHLORIDE, SODIUM LACTATE AND CALCIUM CHLORIDE: 600; 310; 30; 20 INJECTION, SOLUTION INTRAVENOUS at 07:20

## 2020-01-07 RX ADMIN — LIDOCAINE HYDROCHLORIDE 0.1 ML: 10 INJECTION, SOLUTION EPIDURAL; INFILTRATION; INTRACAUDAL; PERINEURAL at 07:19

## 2020-01-07 ASSESSMENT — PAIN DESCRIPTION - DESCRIPTORS: DESCRIPTORS: ACHING;RADIATING;THROBBING;CONSTANT

## 2020-01-07 ASSESSMENT — PAIN - FUNCTIONAL ASSESSMENT
PAIN_FUNCTIONAL_ASSESSMENT: PREVENTS OR INTERFERES SOME ACTIVE ACTIVITIES AND ADLS
PAIN_FUNCTIONAL_ASSESSMENT: 0-10

## 2020-01-07 NOTE — OP NOTE
Patient:  Kvng Corea  YOB: 1960  Medical Record #:  [id number]   Place: [address], [place]  Date:  1/7/2020   Physician:  Jesus Carpenter MD, ANN    Procedure: 1. Transforaminal Lumbar Epidural Steroid Injection -  right L4  CPT 47419          2. Transforaminal Lumbar Epidural Steroid Injection -  right L5  CPT 49860    Pre-Procedure Diagnosis: Lumbar radiculopathy      Post-Procedure Diagnosis: Same    Sedation: Local with 1% Lidocaine 3 ml and 2 mg of IV Versed    EBL: None    Complications: None    Procedure Summary:        The patient was brought to the procedure suite and placed in the prone position. The skin overlying the lumbar spine was prepped and draped in the usual sterile fashion. Using fluoroscopic guidance, the right L4 foramen was identified. Through anesthetized skin, a 22 gauge 5 inch curved tip spinal needle was advanced into the foramen. Isovue M 300 was instilled showing an epidurogram/nerve root outline pattern without evidence of vascular or intrathecal spread. Following which, 7.5 mg of Celestone mixed with 1 ml of 0.5% Marcaine was instilled. The needle was removed. Using fluoroscopic guidance, the right L5 foramen was identified. Through anesthetized skin, a 22 gauge 5 inch curved tip spinal needle was advanced into the foramen. Isovue M 300 was instilled showing an epidurogram/nerve root outline pattern without evidence of vascular or intrathecal spread. Following which, 7.5 mg of Celestone mixed with 1 ml of 0.5% Marcaine was instilled. The needle was removed and band-aids were applied. The patient was transferred to the post-operative area in stable condition.

## 2020-01-07 NOTE — POST SEDATION
Sedation Post Procedure Note    Patient Name: India Del Cid   YOB: 1960  Room/Bed: EG OR/NONE  Medical Record Number: [id number]  Date: 1/7/2020   Time: 9:37 AM         Physicians/Assistants:  Kendra Thomas MD    Procedure Performed:  LESI    Post-Sedation Vital Signs:  Vitals:    01/07/20 0910   BP: 133/84   Pulse: 88   Resp: 16   Temp:    SpO2: 92%      Vital signs were reviewed and were stable after the procedure (see flow sheet for vitals)            Post-Sedation Exam: Lungs: clear           Complications: none    Electronically signed by Kendra Thomas MD on 1/7/2020 at 9:37 AM

## 2020-01-07 NOTE — H&P
HISTORY AND PHYSICAL/PRE-SEDATION ASSESSMENT    Patient:  Dominick Ludwig   :  1960  Medical Record No.:  [id number]   Date:  2020  Physician:  Rebecca Rosario M.D. Facility: [facility]    HISTORY OF PRESENT ILLNESS:                 The patient is a 64 y.o. male whom presents with lower back and right leg pain. Review of the imaging and physical exam of the patient confirmed the pre-procedure diagnosis. After a thorough discussion of risks, benefits and alternatives informed consent was obtained. Past Medical History:   Past Medical History:   Diagnosis Date    Anxiety 2020    Chronic obstructive pulmonary disease ([facility] 9/3/2019    PFT done 19    Crush injury of right foot 2015    DDD (degenerative disc disease), lumbar 2020    Erectile dysfunction 2020    Fatigue 2020    History of alcohol abuse 2020    History of drug use     HNP (herniated nucleus pulposus), lumbar 2020    Hyperglycemia 2020    Hypersomnia 2020    Kidney stone     Lumbar radiculopathy 2020    Lumbar spine pain 2020    Observed sleep apnea 2020    Reactive depression 2020    Spondylosis without myelopathy or radiculopathy, lumbar region 2020    Tobacco use 2020      Past Surgical History:     Past Surgical History:   Procedure Laterality Date    CHOLECYSTECTOMY      CYSTOSCOPY  2011    cystoscopy left ureteroscopy, left retrograde, double J stent placement    PAIN MANAGEMENT PROCEDURE Right 2019    RIGHT LUMBAR FIVE SACRAL ONE TRANSFORAMINAL EPIDURAL STEROID INJECTION SITE CONFIRMED BY FLUOROSCOPY performed by Rebecca Rosario MD at [place] [address]     Current Medications:   Prior to Admission medications    Medication Sig Start Date End Date Taking? Authorizing Provider   gabapentin (NEURONTIN) 100 MG capsule Take 1 capsule by mouth 3 times daily for 90 days.  Intended supply: 90 days 20 Yes [facility]   PARoxetine (PAXIL) 40 MG tablet TAKE ONE TABLET BY MOUTH EVERY EVENING 1/2/20  [facility]   ibuprofen (ADVIL;MOTRIN) 800 MG tablet Take 1 tablet by mouth 3 times daily 12/17/19 1/16/20 Yes RYAN Ramos   tiotropium (SPIRIVA RESPIMAT) 2.5 MCG/ACT AERS inhaler Inhale 2 puffs into the lungs daily 11/4/19  Yes Alexia ArthurANICETO CNP   buPROPion [facility]) 150 MG extended release tablet Take 1 tablet by mouth 2 times daily 9/25/19  Yes [facility]   albuterol (PROVENTIL) (2.5 MG/3ML) 0.083% nebulizer solution Take 3 mLs by nebulization every 6 hours as needed for Wheezing or Shortness of Breath 9/25/19  Yes [facility]   hydrOXYzine (VISTARIL) 25 MG capsule Take 1 capsule by mouth 3 times daily as needed for Anxiety 8/23/19  Yes [facility] Agu[facility] C[facility]   albuterol sulfate HFA (PROVENTIL HFA) 108 (90 Base) MCG/ACT inhaler Inhale 2 puffs into the lungs every 6 hours as needed for Wheezing or Shortness of Breath 8/22/19  Yes Jeff Adams MD   albuterol sulfate HFA (PROVENTIL HFA) 108 (90 Base) MCG/ACT inhaler Inhale 2 puffs into the lungs every 6 hours as needed for Wheezing 5/17/19  Yes Viki Phillips MD   sildenafil (REVATIO) 20 MG tablet Take 1 tablet by mouth daily as needed (erectile dysfunction) 9/25/19   [facility]   ipratropium (ATROVENT) 0.02 % nebulizer solution Take 2.5 mLs by nebulization 4 times daily as needed for Wheezing (short of breath) 9/25/19   [facility] ANICETO Gallo CNP     Allergies:  Codeine and Dilaudid [hydromorphone hcl]  Social History:    reports that he has been smoking. He started smoking about 46 years ago. He has a 70.00 pack-year smoking history. He has never used smokeless tobacco. He reports previous drug use. He reports that he does not drink alcohol.   Family History:   Family History   Problem Relation Age of Onset    Heart Disease Mother     Other Mother         copd    Liver Disease Father     High Blood Pressure Sister     No Known Problems Sister        Vitals: Blood pressure (!) 163/109, pulse 86, temperature 97.9 °F (36.6 °C), temperature source Temporal, resp. rate 22, height 5' 5\" (1.651 m), weight 279 lb (126.6 kg), SpO2 93 %. PHYSICAL EXAM:  HENT: Airway patent and reviewed  Cardiovascular: Normal rate, regular rhythm, normal heart sounds. Pulmonary/Chest: No wheezes. No rhonchi. No rales. Abdominal: Soft. Bowel sounds are normal. No distension. Extremities: Moves all extremities equally  Lumbar Spine: Painful range of motion, no midline tenderness       Diagnosis:Lumbar radiculopathy    Plan: Proceed with planned procedure      ASA CLASS:         []   I. Normal, healthy adult           [x]   II.  Mild systemic disease            []   III. Severe systemic disease      Mallampati: Mallampati Class II - (soft palate, fauces & uvula are visible)      Sedation plan:   [x]  Local              []  Minimal                  []  General anesthesia    Patient's condition acceptable for planned procedure/sedation. Post Procedure Plan   Return to same level of care   ______________________     The risks and benefits as well as alternatives to the procedure have been discussed with the patient and or family. The patient and or next of kin understands and agrees to proceed.     Miranda Mireles M.D.

## 2020-03-06 ENCOUNTER — OFFICE VISIT (OUTPATIENT)
Dept: FAMILY MEDICINE CLINIC | Age: 60
End: 2020-03-06
Payer: COMMERCIAL

## 2020-03-06 ENCOUNTER — HOSPITAL ENCOUNTER (OUTPATIENT)
Dept: GENERAL RADIOLOGY | Age: 60
Discharge: HOME | End: 2020-03-06
Payer: COMMERCIAL

## 2020-03-06 ENCOUNTER — HOSPITAL ENCOUNTER (OUTPATIENT)
Age: 60
Discharge: HOME | End: 2020-03-06
Payer: COMMERCIAL

## 2020-03-06 VITALS
HEIGHT: 65 IN | TEMPERATURE: 98.6 F | BODY MASS INDEX: 48.15 KG/M2 | HEART RATE: 91 BPM | SYSTOLIC BLOOD PRESSURE: 136 MMHG | WEIGHT: 289 LBS | OXYGEN SATURATION: 93 % | DIASTOLIC BLOOD PRESSURE: 78 MMHG

## 2020-03-06 LAB
INFLUENZA A ANTIBODY: NORMAL
INFLUENZA B ANTIBODY: NORMAL
S PYO AG THROAT QL: NORMAL

## 2020-03-06 PROCEDURE — 94640 AIRWAY INHALATION TREATMENT: CPT | Performed by: NURSE PRACTITIONER

## 2020-03-06 PROCEDURE — 71046 X-RAY EXAM CHEST 2 VIEWS: CPT

## 2020-03-06 PROCEDURE — 87804 INFLUENZA ASSAY W/OPTIC: CPT | Performed by: NURSE PRACTITIONER

## 2020-03-06 PROCEDURE — 87880 STREP A ASSAY W/OPTIC: CPT | Performed by: NURSE PRACTITIONER

## 2020-03-06 PROCEDURE — 96372 THER/PROPH/DIAG INJ SC/IM: CPT | Performed by: NURSE PRACTITIONER

## 2020-03-06 PROCEDURE — 99215 OFFICE O/P EST HI 40 MIN: CPT | Performed by: NURSE PRACTITIONER

## 2020-03-06 RX ORDER — PAROXETINE HYDROCHLORIDE 40 MG/1
TABLET, FILM COATED ORAL
Qty: 30 TABLET | Refills: 1 | Status: SHIPPED | OUTPATIENT
Start: 2020-03-06 | End: 2020-04-09 | Stop reason: ALTCHOICE

## 2020-03-06 RX ORDER — METHYLPREDNISOLONE SODIUM SUCCINATE 125 MG/2ML
125 INJECTION, POWDER, LYOPHILIZED, FOR SOLUTION INTRAMUSCULAR; INTRAVENOUS ONCE
Status: COMPLETED | OUTPATIENT
Start: 2020-03-06 | End: 2020-03-06

## 2020-03-06 RX ORDER — ALBUTEROL SULFATE 2.5 MG/3ML
2.5 SOLUTION RESPIRATORY (INHALATION) ONCE
Status: COMPLETED | OUTPATIENT
Start: 2020-03-06 | End: 2020-03-06

## 2020-03-06 RX ORDER — PREDNISONE 10 MG/1
TABLET ORAL
Qty: 30 TABLET | Refills: 0 | Status: ON HOLD
Start: 2020-03-06 | End: 2020-03-17 | Stop reason: HOSPADM

## 2020-03-06 RX ORDER — DOXYCYCLINE HYCLATE 100 MG
100 TABLET ORAL 2 TIMES DAILY
Qty: 20 TABLET | Refills: 0 | Status: ON HOLD | OUTPATIENT
Start: 2020-03-06 | End: 2020-03-17 | Stop reason: HOSPADM

## 2020-03-06 RX ORDER — DEXTROMETHORPHAN HYDROBROMIDE AND PROMETHAZINE HYDROCHLORIDE 15; 6.25 MG/5ML; MG/5ML
5 SYRUP ORAL 4 TIMES DAILY PRN
Qty: 240 ML | Refills: 0 | Status: ON HOLD | OUTPATIENT
Start: 2020-03-06 | End: 2020-03-17 | Stop reason: HOSPADM

## 2020-03-06 RX ADMIN — METHYLPREDNISOLONE SODIUM SUCCINATE 125 MG: 125 INJECTION, POWDER, LYOPHILIZED, FOR SOLUTION INTRAMUSCULAR; INTRAVENOUS at 16:48

## 2020-03-06 RX ADMIN — ALBUTEROL SULFATE 2.5 MG: 2.5 SOLUTION RESPIRATORY (INHALATION) at 15:47

## 2020-03-06 RX ADMIN — Medication 0.5 MG: at 15:48

## 2020-03-06 ASSESSMENT — ENCOUNTER SYMPTOMS
COUGH: 1
CHEST TIGHTNESS: 1
RHINORRHEA: 0
STRIDOR: 0
CHOKING: 0
NAUSEA: 0
DIARRHEA: 0
TROUBLE SWALLOWING: 0
APNEA: 0
SINUS PAIN: 0
ABDOMINAL PAIN: 0
SORE THROAT: 1
SHORTNESS OF BREATH: 1
WHEEZING: 1
VOMITING: 0
SINUS PRESSURE: 0
SWOLLEN GLANDS: 0
FACIAL SWELLING: 0
VOICE CHANGE: 0

## 2020-03-06 NOTE — PROGRESS NOTES
[facility]  PHYSICIAN PRACTICES  [facility] MEDICINE  [address] [place] [address] [address] [place] [address]  Dept: [phone]  Dept Fax: [phone]  Loc: [phone]    Ruiz Cowan is a 64 y.o. male who presents today for his medical conditions/complaints as noted below. Ruiz Cowan is c/o of URI (pt has been feelin bad for  one week. pt is having trouble breathing, chest congestion, chest tightness, sob, chills, fatigue )        HPI:     Chief Complaint   Patient presents with    URI     pt has been feelin bad for  one week. pt is having trouble breathing, chest congestion, chest tightness, sob, chills, fatigue        URI    This is a new problem. The current episode started in the past 7 days. The problem has been gradually worsening. Maximum temperature: Subjective fevers - he admits to chills. Associated symptoms include chest pain (\"Something sitting on my chest\"), congestion, coughing (At times ), ear pain (Bilateral ), headaches, a sore throat and wheezing. Pertinent negatives include no abdominal pain, diarrhea, dysuria, joint pain, joint swelling, nausea, neck pain, plugged ear sensation, rash, rhinorrhea, sinus pain, sneezing, swollen glands or vomiting. He has tried inhaler use for the symptoms. The treatment provided no relief. He states he has a lot of stress at home. He was taking Paxil for his anxiety with Wellbutrin. He states he stopped taking Paxil as he ran out one months ago. He is asking to go back on the Paxil. He denies any thoughts of hurting himself or others.    Past Medical History:   Diagnosis Date    Anxiety 1/6/2020    Chronic obstructive pulmonary disease ([facility] [address] 9/3/2019    PFT done 9/03/19    Crush injury of right foot 7/23/2015    DDD (degenerative disc disease), lumbar 1/6/2020    Erectile dysfunction 1/6/2020    Fatigue 1/6/2020    History of alcohol abuse 1/6/2020    History of drug use     HNP (herniated nucleus pulposus), lumbar 1/6/2020    Hyperglycemia 1/6/2020  Hypersomnia 1/6/2020    Kidney stone     Lumbar radiculopathy 1/6/2020    Lumbar spine pain 1/6/2020    Observed sleep apnea 1/6/2020    Reactive depression 1/6/2020    Spondylosis without myelopathy or radiculopathy, lumbar region 1/6/2020    Tobacco use 1/6/2020      Past Surgical History:   Procedure Laterality Date    CHOLECYSTECTOMY      CYSTOSCOPY  03/16/2011    cystoscopy left ureteroscopy, left retrograde, double J stent placement    PAIN MANAGEMENT PROCEDURE Right 12/24/2019    RIGHT LUMBAR FIVE SACRAL ONE TRANSFORAMINAL EPIDURAL STEROID INJECTION SITE CONFIRMED BY FLUOROSCOPY performed by Hair Celis MD at [address] [place] [address] Right 1/7/2020    RIGHT LUMBAR FOUR AND LUMBAR FIVE TRANSFORAMINAL EPIDURAL STEROID INJECTION SITE CONFIRMED BY FLUOROSCOPY performed by Hair Celis MD at [place] [address]       Family History   Problem Relation Age of Onset    Heart Disease Mother     Other Mother         copd    Liver Disease Father     High Blood Pressure Sister     No Known Problems Sister        Social History     Tobacco Use    Smoking status: Current Every Day Smoker     Packs/day: 2.00     Years: 35.00     Pack years: 70.00     Start date: 1974    Smokeless tobacco: Never Used   Substance Use Topics    Alcohol use: No         Current Outpatient Medications   Medication Sig Dispense Refill    PARoxetine (PAXIL) 40 MG tablet TAKE ONE TABLET BY MOUTH EVERY EVENING 30 tablet 1    doxycycline hyclate (VIBRA-TABS) 100 MG tablet Take 1 tablet by mouth 2 times daily for 10 days 20 tablet 0    predniSONE (DELTASONE) 10 MG tablet Take 40 mg for 3 days then 30 mg for 3 days then 20 mg for 3 days then 10 mg for 3 days 30 tablet 0    promethazine-dextromethorphan (PROMETHAZINE-DM) 6.25-15 MG/5ML syrup Take 5 mLs by mouth 4 times daily as needed for Cough DO NOT DRIVE OR OPERATE MACHINERY OR FIREARMS WITH MEDICATION 240 mL 0    gabapentin (NEURONTIN) 100 MG capsule Take 1 capsule by mouth 3 times daily for 90 days. Intended supply: 90 days 90 capsule 2    ibuprofen (ADVIL;MOTRIN) 800 MG tablet Take 1 tablet by mouth 3 times daily 90 tablet 0    tiotropium (SPIRIVA RESPIMAT) 2.5 MCG/ACT AERS inhaler Inhale 2 puffs into the lungs daily 1 Inhaler 11    buPROPion (WELLBUTRIN SR) 150 MG extended release tablet Take 1 tablet by mouth 2 times daily 60 tablet 2    sildenafil (REVATIO) 20 MG tablet Take 1 tablet by mouth daily as needed (erectile dysfunction) 7 tablet 2    albuterol (PROVENTIL) (2.5 MG/3ML) 0.083% nebulizer solution Take 3 mLs by nebulization every 6 hours as needed for Wheezing or Shortness of Breath 25 vial 1    ipratropium (ATROVENT) 0.02 % nebulizer solution Take 2.5 mLs by nebulization 4 times daily as needed for Wheezing (short of breath) 2.5 mL 1    hydrOXYzine (VISTARIL) 25 MG capsule Take 1 capsule by mouth 3 times daily as needed for Anxiety 60 capsule 1    albuterol sulfate HFA (PROVENTIL HFA) 108 (90 Base) MCG/ACT inhaler Inhale 2 puffs into the lungs every 6 hours as needed for Wheezing or Shortness of Breath 1 Inhaler 6    albuterol sulfate HFA (PROVENTIL HFA) 108 (90 Base) MCG/ACT inhaler Inhale 2 puffs into the lungs every 6 hours as needed for Wheezing 1 Inhaler 0     No current facility-administered medications for this visit. Allergies   Allergen Reactions    Codeine Itching    Dilaudid [Hydromorphone Hcl]        Subjective:      Review of Systems   Constitutional: Positive for activity change, appetite change, chills, fatigue and fever (Subjective related to chills). Negative for diaphoresis and unexpected weight change. HENT: Positive for congestion, ear pain (Bilateral ) and sore throat. Negative for dental problem, drooling, ear discharge, facial swelling, hearing loss, mouth sores, nosebleeds, postnasal drip, rhinorrhea, sinus pressure, sinus pain, sneezing, tinnitus, trouble swallowing and voice change.     Respiratory: Positive for cough (At times ), chest tightness, shortness of breath (At rest and with exertion) and wheezing. Negative for apnea, choking and stridor. Cardiovascular: Positive for chest pain (\"Something sitting on my chest\"). Negative for palpitations and leg swelling. Gastrointestinal: Negative for abdominal pain, diarrhea, nausea and vomiting. Genitourinary: Negative for dysuria. Musculoskeletal: Negative for joint pain and neck pain. Skin: Negative for rash. Neurological: Positive for headaches. Negative for dizziness and light-headedness. Objective:     Vitals:    03/06/20 1459   BP: 136/78   Site: Right Upper Arm   Position: Sitting   Cuff Size: Large Adult   Pulse: 91   Temp: 98.6 °F (37 °C)   SpO2: 93%   Weight: 289 lb (131.1 kg)   Height: 5' 5\" (1.651 m)     Wt Readings from Last 3 Encounters:   03/06/20 289 lb (131.1 kg)   01/07/20 279 lb (126.6 kg)   01/06/20 279 lb (126.6 kg)     Temp Readings from Last 3 Encounters:   03/06/20 98.6 °F (37 °C)   01/07/20 97.9 °F (36.6 °C) (Temporal)   12/24/19 97 °F (36.1 °C) (Temporal)     BP Readings from Last 3 Encounters:   03/06/20 136/78   01/07/20 133/84   01/06/20 110/78     Pulse Readings from Last 3 Encounters:   03/06/20 91   01/07/20 88   01/06/20 84     Physical Exam  Vitals signs and nursing note reviewed. Constitutional:       General: He is not in acute distress. Appearance: Normal appearance. He is well-developed. He is obese. He is ill-appearing. He is not toxic-appearing or diaphoretic. HENT:      Head: Normocephalic and atraumatic. Right Ear: Tympanic membrane, ear canal and external ear normal. There is no impacted cerumen. Left Ear: Tympanic membrane, ear canal and external ear normal. There is no impacted cerumen. Nose: Mucosal edema and congestion present. No rhinorrhea. Right Sinus: No maxillary sinus tenderness or frontal sinus tenderness.       Left Sinus: No maxillary sinus tenderness or frontal sinus tenderness. Mouth/Throat:      Mouth: Mucous membranes are moist.      Pharynx: Oropharynx is clear. Posterior oropharyngeal erythema present. No oropharyngeal exudate. Eyes:      General: No scleral icterus. Right eye: No discharge. Left eye: No discharge. Conjunctiva/sclera: Conjunctivae normal.   Neck:      Musculoskeletal: Normal range of motion and neck supple. No neck rigidity or muscular tenderness. Vascular: No carotid bruit. Trachea: No tracheal deviation. Cardiovascular:      Rate and Rhythm: Normal rate and regular rhythm. Pulses: Normal pulses. Heart sounds: Normal heart sounds. No murmur. No friction rub. No gallop. Pulmonary:      Effort: Pulmonary effort is normal. No respiratory distress. Breath sounds: No stridor. Examination of the right-upper field reveals wheezing. Examination of the left-upper field reveals wheezing. Examination of the right-middle field reveals wheezing. Examination of the left-middle field reveals wheezing. Examination of the right-lower field reveals wheezing. Examination of the left-lower field reveals wheezing. Wheezing (Wheezing improved significantly after breathing treatment ) present. No rhonchi or rales. Chest:      Chest wall: No tenderness. Abdominal:      General: Bowel sounds are normal. There is no distension. Palpations: Abdomen is soft. There is no mass. Tenderness: There is no abdominal tenderness. There is no guarding or rebound. Hernia: No hernia is present. Musculoskeletal: Normal range of motion. General: No swelling, tenderness, deformity or signs of injury. Right lower leg: No edema. Left lower leg: No edema. Lymphadenopathy:      Cervical: No cervical adenopathy. Skin:     General: Skin is warm and dry. Capillary Refill: Capillary refill takes less than 2 seconds. Coloration: Skin is not jaundiced or pale.       Findings: No bruising, visit:    COPD exacerbation ([facility] [address]  -     albuterol (PROVENTIL) nebulizer solution 2.5 mg  -     ipratropium (ATROVENT) 0.02 % nebulizer solution 0.5 mg  -     methylPREDNISolone sodium (SOLU-MEDROL) injection 125 mg  -     doxycycline hyclate (VIBRA-TABS) 100 MG tablet; Take 1 tablet by mouth 2 times daily for 10 days  -     predniSONE (DELTASONE) 10 MG tablet; Take 40 mg for 3 days then 30 mg for 3 days then 20 mg for 3 days then 10 mg for 3 days    Shortness of breath  -     XR CHEST STANDARD (2 VW); Future    Cough  -     promethazine-dextromethorphan (PROMETHAZINE-DM) 6.25-15 MG/5ML syrup; Take 5 mLs by mouth 4 times daily as needed for Cough DO NOT DRIVE OR OPERATE MACHINERY OR FIREARMS WITH MEDICATION    Reactive depression  -     PARoxetine (PAXIL) 40 MG tablet; TAKE ONE TABLET BY MOUTH EVERY EVENING    Anxiety  -     PARoxetine (PAXIL) 40 MG tablet; TAKE ONE TABLET BY MOUTH EVERY EVENING    Sore throat  -     THROAT CULTURE  -     POCT rapid strep A    Flu-like symptoms  -     POCT Influenza A/B    Tobacco Abuse     Prior to Visit Medications    Medication Sig Taking? Authorizing Provider   PARoxetine (PAXIL) 40 MG tablet TAKE ONE TABLET BY MOUTH EVERY EVENING Yes [place] Model, APRN - CNP   doxycycline hyclate (VIBRA-TABS) 100 MG tablet Take 1 tablet by mouth 2 times daily for 10 days Yes [place] Model, APRN - CNP   predniSONE (DELTASONE) 10 MG tablet Take 40 mg for 3 days then 30 mg for 3 days then 20 mg for 3 days then 10 mg for 3 days Yes [place] Model, APRN - CNP   promethazine-dextromethorphan (PROMETHAZINE-DM) 6.25-15 MG/5ML syrup Take 5 mLs by mouth 4 times daily as needed for Cough DO NOT DRIVE OR OPERATE MACHINERY OR FIREARMS WITH MEDICATION Yes [place] Model, APRN - CNP   gabapentin (NEURONTIN) 100 MG capsule Take 1 capsule by mouth 3 times daily for 90 days.  Intended supply: 90 days Yes Claudette Gaines, APRN - CNP   ibuprofen (ADVIL;MOTRIN) 800 MG tablet Take 1 tablet by mouth 3 times daily Yes RYAN Ramires   tiotropium (SPIRIVA RESPIMAT) 2.5 MCG/ACT AERS inhaler Inhale 2 puffs into the lungs daily Yes ANICETO Rubio CNP   buPROPion (WELLBUTRIN SR) 150 MG extended release tablet Take 1 tablet by mouth 2 times daily Yes ANICETO Rubio CNP   sildenafil (REVATIO) 20 MG tablet Take 1 tablet by mouth daily as needed (erectile dysfunction) Yes ANICETO Rubio CNP   albuterol (PROVENTIL) (2.5 MG/3ML) 0.083% nebulizer solution Take 3 mLs by nebulization every 6 hours as needed for Wheezing or Shortness of Breath Yes ANICETO Rubio CNP   ipratropium (ATROVENT) 0.02 % nebulizer solution Take 2.5 mLs by nebulization 4 times daily as needed for Wheezing (short of breath) Yes ANICETO Rubio CNP   hydrOXYzine (VISTARIL) 25 MG capsule Take 1 capsule by mouth 3 times daily as needed for Anxiety Yes ANICETO Rubio CNP   albuterol sulfate HFA (PROVENTIL HFA) 108 (90 Base) MCG/ACT inhaler Inhale 2 puffs into the lungs every 6 hours as needed for Wheezing or Shortness of Breath Yes Wendy South MD   albuterol sulfate HFA (PROVENTIL HFA) 108 (90 Base) MCG/ACT inhaler Inhale 2 puffs into the lungs every 6 hours as needed for Wheezing Yes Rosalio Prado MD        Orders Placed This Encounter   Medications    PARoxetine (PAXIL) 40 MG tablet     Sig: TAKE ONE TABLET BY MOUTH EVERY EVENING     Dispense:  30 tablet     Refill:  1    albuterol (PROVENTIL) nebulizer solution 2.5 mg    ipratropium (ATROVENT) 0.02 % nebulizer solution 0.5 mg    methylPREDNISolone sodium (SOLU-MEDROL) injection 125 mg    doxycycline hyclate (VIBRA-TABS) 100 MG tablet     Sig: Take 1 tablet by mouth 2 times daily for 10 days     Dispense:  20 tablet     Refill:  0    predniSONE (DELTASONE) 10 MG tablet     Sig: Take 40 mg for 3 days then 30 mg for 3 days then 20 mg for 3 days then 10 mg for 3 days     Dispense:  30 tablet Refill:  0    promethazine-dextromethorphan (PROMETHAZINE-DM) 6.25-15 MG/5ML syrup     Sig: Take 5 mLs by mouth 4 times daily as needed for Cough DO NOT DRIVE OR OPERATE MACHINERY OR FIREARMS WITH MEDICATION     Dispense:  240 mL     Refill:  0         No follow-ups on file. Patient should call the office immediately with new or ongoing signs or symptoms or worsening, or proceedto the emergency room. No changes in past medical history, past surgical history, social history, or family history were noted during the patient encounter unless specifically listed above. All updates of past medicalhistory, past surgical history, social history, or family history were reviewed personally by me during the office visit. All problems listed in the assessment are stable unless noted otherwise. Medication profilereviewed personally by me during the office visit. Medication side effects and possible impairments from medications were discussed as applicable.     Call if pattern of symptoms change or persists for an extended time. This document was prepared by a combination of typing and transcription through a voice recognition software. This provider spent 40 minutes in the room with the patient with 50% or greater being utilized on patient education. Patient educated on COPD exacerbation, when to go to ER, anxiety, tobacco abuse. Tobacco abuse: Patient was counseled about stopping tobacco use. Discussed harmful effects of continued tobacco use including COPD, cardiovascular disease, and/or death. Discussed the increased risk of pneumonia as well as the potential for substantial decline in lung function.  The following recommendations were given to improve chances of quittin)                   Chances of stopping improve with each attempt     2)                   Encourage all other occupants in the house to quit     3)                   Remove all tobacco products from home, work, and vehicles     4)

## 2020-03-06 NOTE — PATIENT INSTRUCTIONS
Patient Education        Chronic Obstructive Pulmonary Disease (COPD) Flare-Ups: Care Instructions  Your Care Instructions    Chronic obstructive pulmonary disease (COPD) is a lung disease that makes it hard to breathe. It is caused by damage to the lungs over many years, usually from smoking. COPD is often a mix of two diseases:  · Chronic bronchitis: The airways that carry air to the lungs (bronchial tubes) get inflamed and make a lot of mucus. This can narrow or block the airways. · Emphysema: In a healthy person, the tiny air sacs in the lungs are like balloons. As you breathe in and out, they get bigger and smaller to move air through your lungs. But with emphysema, these air sacs are damaged and lose their stretch. Less air gets in and out of the lungs. Many people with COPD have attacks called flare-ups or exacerbations. This is when your usual symptoms quickly get worse and stay worse. The doctor has checked you carefully. But problems can develop later. If you notice any problems or new symptoms, get medical treatment right away. Follow-up care is a key part of your treatment and safety. Be sure to make and go to all appointments, and call your doctor if you are having problems. It's also a good idea to know your test results and keep a list of the medicines you take. How can you care for yourself at home? · Be safe with medicines. Take your medicines exactly as prescribed. Call your doctor if you think you are having a problem with your medicine. You may be taking medicines such as:  ? Bronchodilators. These help open your airways and make breathing easier. ? Corticosteroids. These reduce airway inflammation. They may be given as pills, in a vein, or in an inhaled form. You may go home with pills in addition to an inhaler that you already use. · A spacer may help you get more inhaled medicine to your lungs. Ask your doctor or pharmacist if a spacer is right for you.  If it is, ask how to use it properly. · If your doctor prescribed antibiotics, take them as directed. Do not stop taking them just because you feel better. You need to take the full course of antibiotics. · If your doctor prescribed oxygen, use the flow rate your doctor has recommended. Do not change it without talking to your doctor first.  · Do not smoke. Smoking makes COPD worse. If you need help quitting, talk to your doctor about stop-smoking programs and medicines. These can increase your chances of quitting for good. When should you call for help? Call 911 anytime you think you may need emergency care. For example, call if:    · You have severe trouble breathing.    Call your doctor now or seek immediate medical care if:    · You have new or worse trouble breathing.     · Your coughing or wheezing gets worse.     · You cough up dark brown or bloody mucus (sputum).     · You have a new or higher fever.    Watch closely for changes in your health, and be sure to contact your doctor if:    · You notice more mucus or a change in the color of your mucus.     · You need to use your antibiotic or steroid pills.     · You do not get better as expected. Where can you learn more? Go to [email or web address][email or web address]. org and sign in to your [employer] account. Enter D846 in the User Replay box to learn more about \"Chronic Obstructive Pulmonary Disease (COPD) Flare-Ups: Care Instructions. \"     If you do not have an account, please click on the \"Sign Up Now\" link. Current as of: June 9, 2019  Content Version: 12.3  © 3402-0952 Healthwise, Incorporated. Care instructions adapted under license by [facility] ([facility]). If you have questions about a medical condition or this instruction, always ask your healthcare professional. [place] [address] any warranty or liability for your use of this information.

## 2020-03-08 NOTE — PROGRESS NOTES
Past Medical History:   Diagnosis Date    Anxiety 1/6/2020    Chronic obstructive pulmonary disease ([address] 9/3/2019    PFT done 9/03/19    Crush injury of right foot 7/23/2015    DDD (degenerative disc disease), lumbar 1/6/2020    Erectile dysfunction 1/6/2020    Fatigue 1/6/2020    History of alcohol abuse 1/6/2020    History of drug use     HNP (herniated nucleus pulposus), lumbar 1/6/2020    Hyperglycemia 1/6/2020    Hypersomnia 1/6/2020    Kidney stone     Lumbar radiculopathy 1/6/2020    Lumbar spine pain 1/6/2020    Observed sleep apnea 1/6/2020    Reactive depression 1/6/2020    Spondylosis without myelopathy or radiculopathy, lumbar region 1/6/2020    Tobacco use 1/6/2020      Past Surgical History:   Procedure Laterality Date    CHOLECYSTECTOMY      CYSTOSCOPY  03/16/2011    cystoscopy left ureteroscopy, left retrograde, double J stent placement    PAIN MANAGEMENT PROCEDURE Right 12/24/2019    RIGHT LUMBAR FIVE SACRAL ONE TRANSFORAMINAL EPIDURAL STEROID INJECTION SITE CONFIRMED BY FLUOROSCOPY performed by Grayson Nyhan, MD at [facility] Right 1/7/2020    RIGHT LUMBAR FOUR AND LUMBAR FIVE TRANSFORAMINAL EPIDURAL STEROID INJECTION SITE CONFIRMED BY FLUOROSCOPY performed by Grayson Nyhan, MD at [place] [address]       Family History   Problem Relation Age of Onset    Heart Disease Mother     Other Mother         copd    Liver Disease Father     High Blood Pressure Sister     No Known Problems Sister        Social History     Tobacco Use    Smoking status: Current Every Day Smoker     Packs/day: 2.00     Years: 35.00     Pack years: 70.00     Start date: 1974    Smokeless tobacco: Never Used   Substance Use Topics    Alcohol use: No         Current Outpatient Medications   Medication Sig Dispense Refill    PARoxetine (PAXIL) 40 MG tablet TAKE ONE TABLET BY MOUTH EVERY EVENING 30 tablet 1    doxycycline hyclate (VIBRA-TABS) 100 MG tablet Take pressure, sinus pain and sore throat. Negative for dental problem, drooling, ear discharge, facial swelling, hearing loss, mouth sores, nosebleeds, rhinorrhea, sneezing, tinnitus, trouble swallowing and voice change. Respiratory: Positive for cough, chest tightness, shortness of breath and wheezing. Negative for apnea, choking and stridor. Cardiovascular: Negative. Gastrointestinal: Negative. Genitourinary: Negative. Musculoskeletal: Positive for back pain (Chronic). Skin: Negative. Neurological: Positive for weakness (Generalized malaise ) and headaches. Negative for dizziness and light-headedness. Psychiatric/Behavioral: Positive for sleep disturbance (R/t shortness of breath). Negative for agitation. The patient is nervous/anxious. Objective:     Vitals:    03/09/20 0921   BP: 130/76   Site: Right Upper Arm   Position: Sitting   Cuff Size: Large Adult   Pulse: 97   Temp: 98.6 °F (37 °C)   SpO2: 92%   Weight: 284 lb (128.8 kg)   Height: 5' 5\" (1.651 m)     Wt Readings from Last 3 Encounters:   03/09/20 284 lb (128.8 kg)   03/06/20 289 lb (131.1 kg)   01/07/20 279 lb (126.6 kg)     Temp Readings from Last 3 Encounters:   03/09/20 98.6 °F (37 °C)   03/06/20 98.6 °F (37 °C)   01/07/20 97.9 °F (36.6 °C) (Temporal)     BP Readings from Last 3 Encounters:   03/09/20 130/76   03/06/20 136/78   01/07/20 133/84     Pulse Readings from Last 3 Encounters:   03/09/20 97   03/06/20 91   01/07/20 88     Physical Exam  Vitals signs and nursing note reviewed. Constitutional:       General: He is not in acute distress. Appearance: Normal appearance. He is well-developed. He is obese. He is ill-appearing. He is not toxic-appearing or diaphoretic. HENT:      Head: Normocephalic and atraumatic. Right Ear: Ear canal and external ear normal. There is no impacted cerumen. Left Ear: Ear canal and external ear normal. There is no impacted cerumen. Nose: Congestion present.

## 2020-03-09 ENCOUNTER — APPOINTMENT (OUTPATIENT)
Dept: GENERAL RADIOLOGY | Age: 60
DRG: 208 | End: 2020-03-09
Payer: COMMERCIAL

## 2020-03-09 ENCOUNTER — HOSPITAL ENCOUNTER (INPATIENT)
Age: 60
LOS: 8 days | Discharge: HOME HEALTH | DRG: 208 | End: 2020-03-17
Attending: EMERGENCY MEDICINE | Admitting: INTERNAL MEDICINE
Payer: COMMERCIAL

## 2020-03-09 ENCOUNTER — OFFICE VISIT (OUTPATIENT)
Dept: FAMILY MEDICINE CLINIC | Age: 60
End: 2020-03-09
Payer: COMMERCIAL

## 2020-03-09 ENCOUNTER — TELEPHONE (OUTPATIENT)
Dept: FAMILY MEDICINE CLINIC | Age: 60
End: 2020-03-09

## 2020-03-09 VITALS
HEIGHT: 65 IN | BODY MASS INDEX: 47.32 KG/M2 | OXYGEN SATURATION: 92 % | DIASTOLIC BLOOD PRESSURE: 76 MMHG | TEMPERATURE: 98.6 F | WEIGHT: 284 LBS | SYSTOLIC BLOOD PRESSURE: 130 MMHG | HEART RATE: 97 BPM

## 2020-03-09 PROBLEM — F32.A DEPRESSION: Status: ACTIVE | Noted: 2020-01-06

## 2020-03-09 PROBLEM — J18.9 PNA (PNEUMONIA): Status: ACTIVE | Noted: 2020-03-09

## 2020-03-09 PROBLEM — J44.1 COPD WITH ACUTE EXACERBATION (HCC): Status: ACTIVE | Noted: 2019-09-03

## 2020-03-09 PROBLEM — G47.33 OSA (OBSTRUCTIVE SLEEP APNEA): Status: ACTIVE | Noted: 2020-01-06

## 2020-03-09 PROBLEM — J18.9 PNEUMONIA: Status: ACTIVE | Noted: 2020-03-09

## 2020-03-09 LAB
ANION GAP SERPL CALCULATED.3IONS-SCNC: 13 MMOL/L (ref 3–16)
BACTERIA: ABNORMAL /HPF
BASE EXCESS ARTERIAL: -3.9 MMOL/L (ref -3–3)
BASOPHILS ABSOLUTE: 0 K/UL (ref 0–0.2)
BASOPHILS RELATIVE PERCENT: 0.3 %
BILIRUBIN URINE: NEGATIVE
BLOOD, URINE: ABNORMAL
BUN BLDV-MCNC: 16 MG/DL (ref 7–20)
CALCIUM SERPL-MCNC: 9.1 MG/DL (ref 8.3–10.6)
CARBOXYHEMOGLOBIN ARTERIAL: 1.2 % (ref 0–1.5)
CHLORIDE BLD-SCNC: 101 MMOL/L (ref 99–110)
CLARITY: CLEAR
CO2: 24 MMOL/L (ref 21–32)
COLOR: YELLOW
CREAT SERPL-MCNC: 0.8 MG/DL (ref 0.9–1.3)
EKG ATRIAL RATE: 92 BPM
EKG DIAGNOSIS: NORMAL
EKG P AXIS: 30 DEGREES
EKG P-R INTERVAL: 152 MS
EKG Q-T INTERVAL: 348 MS
EKG QRS DURATION: 74 MS
EKG QTC CALCULATION (BAZETT): 430 MS
EKG R AXIS: -64 DEGREES
EKG T AXIS: 56 DEGREES
EKG VENTRICULAR RATE: 92 BPM
EOSINOPHILS ABSOLUTE: 0 K/UL (ref 0–0.6)
EOSINOPHILS RELATIVE PERCENT: 0.1 %
EPITHELIAL CELLS, UA: ABNORMAL /HPF (ref 0–5)
GFR AFRICAN AMERICAN: >60
GFR NON-AFRICAN AMERICAN: >60
GLUCOSE BLD-MCNC: 158 MG/DL (ref 70–99)
GLUCOSE BLD-MCNC: 190 MG/DL (ref 70–99)
GLUCOSE URINE: NEGATIVE MG/DL
HCO3 ARTERIAL: 18.2 MMOL/L (ref 21–29)
HCT VFR BLD CALC: 47.2 % (ref 40.5–52.5)
HEMOGLOBIN, ART, EXTENDED: 15.4 G/DL (ref 13.5–17.5)
HEMOGLOBIN: 15.6 G/DL (ref 13.5–17.5)
KETONES, URINE: NEGATIVE MG/DL
LACTIC ACID: 2.1 MMOL/L (ref 0.4–2)
LEUKOCYTE ESTERASE, URINE: NEGATIVE
LYMPHOCYTES ABSOLUTE: 0.7 K/UL (ref 1–5.1)
LYMPHOCYTES RELATIVE PERCENT: 5.7 %
MCH RBC QN AUTO: 30.8 PG (ref 26–34)
MCHC RBC AUTO-ENTMCNC: 32.9 G/DL (ref 31–36)
MCV RBC AUTO: 93.6 FL (ref 80–100)
METHEMOGLOBIN ARTERIAL: 0.3 %
MICROSCOPIC EXAMINATION: YES
MONOCYTES ABSOLUTE: 0.7 K/UL (ref 0–1.3)
MONOCYTES RELATIVE PERCENT: 5.5 %
MUCUS: ABNORMAL /LPF
NEUTROPHILS ABSOLUTE: 10.6 K/UL (ref 1.7–7.7)
NEUTROPHILS RELATIVE PERCENT: 88.4 %
NITRITE, URINE: NEGATIVE
O2 CONTENT ARTERIAL: 20 ML/DL
O2 SAT, ARTERIAL: 91.9 %
O2 THERAPY: ABNORMAL
PCO2 ARTERIAL: 26.6 MMHG (ref 35–45)
PDW BLD-RTO: 14.2 % (ref 12.4–15.4)
PERFORMED ON: ABNORMAL
PH ARTERIAL: 7.45 (ref 7.35–7.45)
PH UA: 7 (ref 5–8)
PLATELET # BLD: 204 K/UL (ref 135–450)
PMV BLD AUTO: 7.9 FL (ref 5–10.5)
PO2 ARTERIAL: 57.9 MMHG (ref 75–108)
POTASSIUM REFLEX MAGNESIUM: 3.9 MMOL/L (ref 3.5–5.1)
PRO-BNP: 251 PG/ML (ref 0–124)
PROTEIN UA: ABNORMAL MG/DL
RBC # BLD: 5.05 M/UL (ref 4.2–5.9)
RBC UA: ABNORMAL /HPF (ref 0–4)
SODIUM BLD-SCNC: 138 MMOL/L (ref 136–145)
SPECIFIC GRAVITY UA: 1.02 (ref 1–1.03)
TCO2 ARTERIAL: 19.1 MMOL/L
TROPONIN: <0.01 NG/ML
URINE TYPE: ABNORMAL
UROBILINOGEN, URINE: 1 E.U./DL
WBC # BLD: 12 K/UL (ref 4–11)
WBC UA: ABNORMAL /HPF (ref 0–5)

## 2020-03-09 PROCEDURE — 2580000003 HC RX 258: Performed by: EMERGENCY MEDICINE

## 2020-03-09 PROCEDURE — 6370000000 HC RX 637 (ALT 250 FOR IP): Performed by: INTERNAL MEDICINE

## 2020-03-09 PROCEDURE — 6360000002 HC RX W HCPCS

## 2020-03-09 PROCEDURE — 87449 NOS EACH ORGANISM AG IA: CPT

## 2020-03-09 PROCEDURE — 2500000003 HC RX 250 WO HCPCS

## 2020-03-09 PROCEDURE — 5A1945Z RESPIRATORY VENTILATION, 24-96 CONSECUTIVE HOURS: ICD-10-PCS | Performed by: INTERNAL MEDICINE

## 2020-03-09 PROCEDURE — 2000000000 HC ICU R&B

## 2020-03-09 PROCEDURE — 82803 BLOOD GASES ANY COMBINATION: CPT

## 2020-03-09 PROCEDURE — 6370000000 HC RX 637 (ALT 250 FOR IP): Performed by: EMERGENCY MEDICINE

## 2020-03-09 PROCEDURE — 36415 COLL VENOUS BLD VENIPUNCTURE: CPT

## 2020-03-09 PROCEDURE — 87205 SMEAR GRAM STAIN: CPT

## 2020-03-09 PROCEDURE — 87040 BLOOD CULTURE FOR BACTERIA: CPT

## 2020-03-09 PROCEDURE — 87070 CULTURE OTHR SPECIMN AEROBIC: CPT

## 2020-03-09 PROCEDURE — 31500 INSERT EMERGENCY AIRWAY: CPT

## 2020-03-09 PROCEDURE — 85025 COMPLETE CBC W/AUTO DIFF WBC: CPT

## 2020-03-09 PROCEDURE — 6370000000 HC RX 637 (ALT 250 FOR IP): Performed by: PHYSICIAN ASSISTANT

## 2020-03-09 PROCEDURE — 86710 INFLUENZA VIRUS ANTIBODY: CPT

## 2020-03-09 PROCEDURE — 36600 WITHDRAWAL OF ARTERIAL BLOOD: CPT

## 2020-03-09 PROCEDURE — 6360000002 HC RX W HCPCS: Performed by: EMERGENCY MEDICINE

## 2020-03-09 PROCEDURE — 71046 X-RAY EXAM CHEST 2 VIEWS: CPT

## 2020-03-09 PROCEDURE — 71045 X-RAY EXAM CHEST 1 VIEW: CPT

## 2020-03-09 PROCEDURE — 96374 THER/PROPH/DIAG INJ IV PUSH: CPT

## 2020-03-09 PROCEDURE — 99285 EMERGENCY DEPT VISIT HI MDM: CPT

## 2020-03-09 PROCEDURE — 83880 ASSAY OF NATRIURETIC PEPTIDE: CPT

## 2020-03-09 PROCEDURE — 80048 BASIC METABOLIC PNL TOTAL CA: CPT

## 2020-03-09 PROCEDURE — 83036 HEMOGLOBIN GLYCOSYLATED A1C: CPT

## 2020-03-09 PROCEDURE — 2700000000 HC OXYGEN THERAPY PER DAY

## 2020-03-09 PROCEDURE — 84484 ASSAY OF TROPONIN QUANT: CPT

## 2020-03-09 PROCEDURE — 99214 OFFICE O/P EST MOD 30 MIN: CPT | Performed by: NURSE PRACTITIONER

## 2020-03-09 PROCEDURE — 94761 N-INVAS EAR/PLS OXIMETRY MLT: CPT

## 2020-03-09 PROCEDURE — 83605 ASSAY OF LACTIC ACID: CPT

## 2020-03-09 PROCEDURE — 6360000002 HC RX W HCPCS: Performed by: INTERNAL MEDICINE

## 2020-03-09 PROCEDURE — 6360000002 HC RX W HCPCS: Performed by: PHYSICIAN ASSISTANT

## 2020-03-09 PROCEDURE — 96375 TX/PRO/DX INJ NEW DRUG ADDON: CPT

## 2020-03-09 PROCEDURE — 93010 ELECTROCARDIOGRAM REPORT: CPT | Performed by: INTERNAL MEDICINE

## 2020-03-09 PROCEDURE — 94002 VENT MGMT INPAT INIT DAY: CPT

## 2020-03-09 PROCEDURE — 2580000003 HC RX 258: Performed by: INTERNAL MEDICINE

## 2020-03-09 PROCEDURE — 0BH17EZ INSERTION OF ENDOTRACHEAL AIRWAY INTO TRACHEA, VIA NATURAL OR ARTIFICIAL OPENING: ICD-10-PCS | Performed by: INTERNAL MEDICINE

## 2020-03-09 PROCEDURE — 99222 1ST HOSP IP/OBS MODERATE 55: CPT | Performed by: PHYSICIAN ASSISTANT

## 2020-03-09 PROCEDURE — 94640 AIRWAY INHALATION TREATMENT: CPT

## 2020-03-09 PROCEDURE — 94660 CPAP INITIATION&MGMT: CPT

## 2020-03-09 PROCEDURE — 93005 ELECTROCARDIOGRAM TRACING: CPT | Performed by: EMERGENCY MEDICINE

## 2020-03-09 PROCEDURE — 81001 URINALYSIS AUTO W/SCOPE: CPT

## 2020-03-09 RX ORDER — ALBUTEROL SULFATE 90 UG/1
2 AEROSOL, METERED RESPIRATORY (INHALATION) EVERY 4 HOURS
Status: DISCONTINUED | OUTPATIENT
Start: 2020-03-09 | End: 2020-03-13

## 2020-03-09 RX ORDER — PROPOFOL 10 MG/ML
10 INJECTION, EMULSION INTRAVENOUS
Status: DISCONTINUED | OUTPATIENT
Start: 2020-03-09 | End: 2020-03-12

## 2020-03-09 RX ORDER — PROMETHAZINE HYDROCHLORIDE 25 MG/1
12.5 TABLET ORAL EVERY 6 HOURS PRN
Status: DISCONTINUED | OUTPATIENT
Start: 2020-03-09 | End: 2020-03-17 | Stop reason: HOSPADM

## 2020-03-09 RX ORDER — SODIUM CHLORIDE 9 MG/ML
INJECTION, SOLUTION INTRAVENOUS
Status: DISPENSED
Start: 2020-03-09 | End: 2020-03-10

## 2020-03-09 RX ORDER — NICOTINE 21 MG/24HR
1 PATCH, TRANSDERMAL 24 HOURS TRANSDERMAL DAILY
Status: DISCONTINUED | OUTPATIENT
Start: 2020-03-09 | End: 2020-03-17 | Stop reason: HOSPADM

## 2020-03-09 RX ORDER — ALBUTEROL SULFATE 2.5 MG/3ML
2.5 SOLUTION RESPIRATORY (INHALATION)
Status: DISCONTINUED | OUTPATIENT
Start: 2020-03-09 | End: 2020-03-16

## 2020-03-09 RX ORDER — PREDNISONE 20 MG/1
40 TABLET ORAL DAILY
Status: DISCONTINUED | OUTPATIENT
Start: 2020-03-12 | End: 2020-03-12

## 2020-03-09 RX ORDER — ACETAMINOPHEN 325 MG/1
650 TABLET ORAL EVERY 6 HOURS PRN
Status: DISCONTINUED | OUTPATIENT
Start: 2020-03-09 | End: 2020-03-17 | Stop reason: HOSPADM

## 2020-03-09 RX ORDER — IPRATROPIUM BROMIDE AND ALBUTEROL SULFATE 2.5; .5 MG/3ML; MG/3ML
1 SOLUTION RESPIRATORY (INHALATION) EVERY 4 HOURS
Status: DISCONTINUED | OUTPATIENT
Start: 2020-03-09 | End: 2020-03-09 | Stop reason: CLARIF

## 2020-03-09 RX ORDER — IPRATROPIUM BROMIDE AND ALBUTEROL SULFATE 2.5; .5 MG/3ML; MG/3ML
1 SOLUTION RESPIRATORY (INHALATION) ONCE
Status: COMPLETED | OUTPATIENT
Start: 2020-03-09 | End: 2020-03-09

## 2020-03-09 RX ORDER — PAROXETINE HYDROCHLORIDE 20 MG/1
40 TABLET, FILM COATED ORAL EVERY EVENING
Status: DISCONTINUED | OUTPATIENT
Start: 2020-03-09 | End: 2020-03-15

## 2020-03-09 RX ORDER — METHYLPREDNISOLONE SODIUM SUCCINATE 125 MG/2ML
125 INJECTION, POWDER, LYOPHILIZED, FOR SOLUTION INTRAMUSCULAR; INTRAVENOUS ONCE
Status: COMPLETED | OUTPATIENT
Start: 2020-03-09 | End: 2020-03-09

## 2020-03-09 RX ORDER — MIDAZOLAM HYDROCHLORIDE 1 MG/ML
INJECTION INTRAMUSCULAR; INTRAVENOUS
Status: COMPLETED
Start: 2020-03-09 | End: 2020-03-09

## 2020-03-09 RX ORDER — SODIUM CHLORIDE 0.9 % (FLUSH) 0.9 %
10 SYRINGE (ML) INJECTION PRN
Status: DISCONTINUED | OUTPATIENT
Start: 2020-03-09 | End: 2020-03-17 | Stop reason: HOSPADM

## 2020-03-09 RX ORDER — METHYLPREDNISOLONE SODIUM SUCCINATE 40 MG/ML
40 INJECTION, POWDER, LYOPHILIZED, FOR SOLUTION INTRAMUSCULAR; INTRAVENOUS EVERY 12 HOURS
Status: COMPLETED | OUTPATIENT
Start: 2020-03-09 | End: 2020-03-11

## 2020-03-09 RX ORDER — SODIUM CHLORIDE 0.9 % (FLUSH) 0.9 %
10 SYRINGE (ML) INJECTION EVERY 12 HOURS SCHEDULED
Status: DISCONTINUED | OUTPATIENT
Start: 2020-03-09 | End: 2020-03-17 | Stop reason: HOSPADM

## 2020-03-09 RX ORDER — POLYETHYLENE GLYCOL 3350 17 G/17G
17 POWDER, FOR SOLUTION ORAL DAILY PRN
Status: DISCONTINUED | OUTPATIENT
Start: 2020-03-09 | End: 2020-03-17 | Stop reason: HOSPADM

## 2020-03-09 RX ORDER — ACETAMINOPHEN 650 MG/1
650 SUPPOSITORY RECTAL EVERY 6 HOURS PRN
Status: DISCONTINUED | OUTPATIENT
Start: 2020-03-09 | End: 2020-03-17 | Stop reason: HOSPADM

## 2020-03-09 RX ORDER — ONDANSETRON 2 MG/ML
4 INJECTION INTRAMUSCULAR; INTRAVENOUS EVERY 6 HOURS PRN
Status: DISCONTINUED | OUTPATIENT
Start: 2020-03-09 | End: 2020-03-17 | Stop reason: HOSPADM

## 2020-03-09 RX ORDER — IPRATROPIUM BROMIDE AND ALBUTEROL SULFATE 2.5; .5 MG/3ML; MG/3ML
1 SOLUTION RESPIRATORY (INHALATION)
Status: DISCONTINUED | OUTPATIENT
Start: 2020-03-09 | End: 2020-03-09

## 2020-03-09 RX ORDER — 0.9 % SODIUM CHLORIDE 0.9 %
1000 INTRAVENOUS SOLUTION INTRAVENOUS ONCE
Status: COMPLETED | OUTPATIENT
Start: 2020-03-09 | End: 2020-03-09

## 2020-03-09 RX ORDER — GABAPENTIN 100 MG/1
100 CAPSULE ORAL 3 TIMES DAILY
Status: DISCONTINUED | OUTPATIENT
Start: 2020-03-09 | End: 2020-03-15

## 2020-03-09 RX ORDER — MIDAZOLAM HYDROCHLORIDE 1 MG/ML
2 INJECTION INTRAMUSCULAR; INTRAVENOUS ONCE
Status: COMPLETED | OUTPATIENT
Start: 2020-03-09 | End: 2020-03-09

## 2020-03-09 RX ORDER — BUPROPION HYDROCHLORIDE 150 MG/1
150 TABLET, EXTENDED RELEASE ORAL 2 TIMES DAILY
Status: DISCONTINUED | OUTPATIENT
Start: 2020-03-09 | End: 2020-03-10

## 2020-03-09 RX ADMIN — PROPOFOL 25 MCG/KG/MIN: 10 INJECTION, EMULSION INTRAVENOUS at 22:08

## 2020-03-09 RX ADMIN — FENTANYL CITRATE 25 MCG/HR: 0.05 INJECTION, SOLUTION INTRAMUSCULAR; INTRAVENOUS at 23:08

## 2020-03-09 RX ADMIN — PAROXETINE HYDROCHLORIDE 40 MG: 20 TABLET, FILM COATED ORAL at 16:36

## 2020-03-09 RX ADMIN — AZITHROMYCIN DIHYDRATE 500 MG: 500 INJECTION, POWDER, LYOPHILIZED, FOR SOLUTION INTRAVENOUS at 11:44

## 2020-03-09 RX ADMIN — GABAPENTIN 100 MG: 100 CAPSULE ORAL at 16:36

## 2020-03-09 RX ADMIN — METHYLPREDNISOLONE SODIUM SUCCINATE 125 MG: 125 INJECTION, POWDER, FOR SOLUTION INTRAMUSCULAR; INTRAVENOUS at 10:29

## 2020-03-09 RX ADMIN — MIDAZOLAM HYDROCHLORIDE 2 MG: 1 INJECTION, SOLUTION INTRAMUSCULAR; INTRAVENOUS at 23:30

## 2020-03-09 RX ADMIN — CEFTRIAXONE SODIUM 1 G: 1 INJECTION, POWDER, FOR SOLUTION INTRAMUSCULAR; INTRAVENOUS at 11:44

## 2020-03-09 RX ADMIN — IPRATROPIUM BROMIDE AND ALBUTEROL SULFATE 1 AMPULE: .5; 3 SOLUTION RESPIRATORY (INHALATION) at 15:43

## 2020-03-09 RX ADMIN — ENOXAPARIN SODIUM 40 MG: 40 INJECTION SUBCUTANEOUS at 16:36

## 2020-03-09 RX ADMIN — MIDAZOLAM HYDROCHLORIDE 2 MG: 1 INJECTION, SOLUTION INTRAMUSCULAR; INTRAVENOUS at 23:16

## 2020-03-09 RX ADMIN — IPRATROPIUM BROMIDE AND ALBUTEROL SULFATE 1 AMPULE: .5; 3 SOLUTION RESPIRATORY (INHALATION) at 19:14

## 2020-03-09 RX ADMIN — Medication 2 PUFF: at 23:50

## 2020-03-09 RX ADMIN — IPRATROPIUM BROMIDE AND ALBUTEROL SULFATE 1 AMPULE: .5; 3 SOLUTION RESPIRATORY (INHALATION) at 10:29

## 2020-03-09 RX ADMIN — SODIUM CHLORIDE 1000 ML: 9 INJECTION, SOLUTION INTRAVENOUS at 11:44

## 2020-03-09 RX ADMIN — Medication 4 PUFF: at 23:50

## 2020-03-09 ASSESSMENT — ENCOUNTER SYMPTOMS
SHORTNESS OF BREATH: 1
SINUS PAIN: 1
VOICE CHANGE: 0
BACK PAIN: 1
CHOKING: 0
WHEEZING: 1
GASTROINTESTINAL NEGATIVE: 1
STRIDOR: 0
APNEA: 0
RHINORRHEA: 0
SORE THROAT: 1
CHEST TIGHTNESS: 1
SINUS PRESSURE: 1
TROUBLE SWALLOWING: 0
FACIAL SWELLING: 0
COUGH: 1

## 2020-03-09 ASSESSMENT — PAIN DESCRIPTION - PAIN TYPE: TYPE: CHRONIC PAIN

## 2020-03-09 ASSESSMENT — PULMONARY FUNCTION TESTS
PIF_VALUE: 27
PIF_VALUE: 20
PIF_VALUE: 17
PIF_VALUE: 30

## 2020-03-09 ASSESSMENT — PAIN DESCRIPTION - PROGRESSION: CLINICAL_PROGRESSION: GRADUALLY IMPROVING

## 2020-03-09 ASSESSMENT — PAIN DESCRIPTION - DESCRIPTORS: DESCRIPTORS: ACHING;BURNING

## 2020-03-09 ASSESSMENT — PAIN DESCRIPTION - ORIENTATION: ORIENTATION: MID

## 2020-03-09 ASSESSMENT — PAIN SCALES - GENERAL: PAINLEVEL_OUTOF10: 2

## 2020-03-09 ASSESSMENT — PAIN DESCRIPTION - ONSET: ONSET: ON-GOING

## 2020-03-09 ASSESSMENT — PAIN DESCRIPTION - LOCATION: LOCATION: ANKLE

## 2020-03-09 ASSESSMENT — PAIN DESCRIPTION - FREQUENCY: FREQUENCY: INTERMITTENT

## 2020-03-09 NOTE — ED PROVIDER NOTES
MCG/ACT AERS INHALER    Inhale 2 puffs into the lungs daily       Social history:  reports that he has been smoking. He started smoking about 46 years ago. He has a 70.00 pack-year smoking history. He has never used smokeless tobacco. He reports previous drug use. He reports that he does not drink alcohol. Family history:    Family History   Problem Relation Age of Onset    Heart Disease Mother     Other Mother         copd    Liver Disease Father     High Blood Pressure Sister     No Known Problems Sister          Exam  ED Triage Vitals [03/09/20 0957]   BP Temp Temp src Pulse Resp SpO2 Height Weight   (!) 144/92 97.7 °F (36.5 °C) -- 98 18 93 % 5' 5\" (1.651 m) 280 lb (127 kg)     Nursing note and vitals reviewed. Constitutional: Uncomfortalble appearing  HENT:      Head: Normocephalic      Ears: External ears normal.      Nose: Nose normal.     Mouth: Membrane mucosa moist   Eyes: No discharge. Neck: Supple. Trachea midline. Cardiovascular: Regular rate. Warm extremities  Pulmonary/Chest: Increased work of breathing. Speaking in 5 word sentences. Bilateral wheezes and rales. .   Abdominal: Soft. No distension. Nontender  : Deferred  Rectal: Deferred   Musculoskeletal: Moves all extremities. No gross deformity. Neurological: Alert and oriented. Face symmetric. Speech is clear. Skin: Warm and dry. No rash. Psychiatric: Normal mood and affect. Behavior is normal.    Procedures      EKG  The Ekg interpreted by me in the absence of a cardiologist shows. Normal sinus rhythm. No specific ST-T wave changes appreciated. No evidence of acute ischemia. Radiology  XR CHEST STANDARD (2 VW)    (Results Pending)       Labs  No results found for this visit on 03/09/20. Screenings           MDM and ED Course  Patient is a 65-year-old man with past medical history of COPD who presents with shortness of breath.   Likely secondary to COPD exacerbation given significant presence of wheezing and history of

## 2020-03-09 NOTE — TELEPHONE ENCOUNTER
Received a fax from pharmacy that the promethazine-DM oral syrup is on backorder. See attached. Please advise.    Routing to Charly Ramesh since pt saw her on 3/6/2020

## 2020-03-09 NOTE — PROGRESS NOTES
rate (RR) greater than 35 breaths per minute. Therapy will be held for heart rate (HR) greater than 140 beats per minute, pending direction from physician. 3. Bronchodilators will be administered via Metered Dose Inhaler (MDI) with spacer when the following criteria are met:  a. Alert and cooperative     b. HR < 140 bpm  c. RR < 30 bpm                d. Can demonstrate a 2-3 second inspiratory hold  4. Bronchodilators will be administered via Hand Held Nebulizer HAYLEE [facility] to patients when ANY of the following criteria are met  a. Incognizant or uncooperative          b. Patients treated with HHN at Home        c. Unable to demonstrate proper use of MDI with spacer     d. RR > 30 bpm   5. Bronchodilators will be delivered via Metered Dose Inhaler (MDI), HHN, Aerogen to intubated patients on mechanical ventilation. 6. Inhalation medication orders will be delivered and/or substituted as outlined below. Aerosolized Medications Ordering and Administration Guidelines:    1. All Medications will be ordered by a physician, and their frequency and/or modality will be adjusted as defined by the patients Respiratory Severity Index (RSI) score. 2. If the patient does not have documented COPD, consider discontinuing anticholinergics when RSI is less than 9.  3. If the bronchospasm worsens (increased RSI), then the bronchodilator frequency can be increased to a maximum of every 4 hours. If greater than every 4 hours is required, the physician will be contacted. 4. If the bronchospasm improves, the frequency of the bronchodilator can be decreased, based on the patient's RSI, but not less than home treatment regimen frequency. 5. Bronchodilator(s) will be discontinued if patient has a RSI less than 9 and has received no scheduled or as needed treatment for 72  Hrs. Patients Ordered on a Mucolytic Agent:    1. Must always be administered with a bronchodilator.     2. Discontinue if patient experiences worsened bronchospasm,

## 2020-03-10 ENCOUNTER — APPOINTMENT (OUTPATIENT)
Dept: GENERAL RADIOLOGY | Age: 60
DRG: 208 | End: 2020-03-10
Payer: COMMERCIAL

## 2020-03-10 LAB
ALBUMIN SERPL-MCNC: 2.9 G/DL (ref 3.4–5)
ANION GAP SERPL CALCULATED.3IONS-SCNC: 12 MMOL/L (ref 3–16)
APPEARANCE BAL (LAVAGE): ABNORMAL
BASE EXCESS ARTERIAL: -1.6 MMOL/L (ref -3–3)
BASE EXCESS ARTERIAL: -7.1 MMOL/L (ref -3–3)
BASOPHILS ABSOLUTE: 0 K/UL (ref 0–0.2)
BASOPHILS RELATIVE PERCENT: 0.2 %
BUN BLDV-MCNC: 25 MG/DL (ref 7–20)
CALCIUM SERPL-MCNC: 8.5 MG/DL (ref 8.3–10.6)
CARBOXYHEMOGLOBIN ARTERIAL: 0.4 % (ref 0–1.5)
CARBOXYHEMOGLOBIN ARTERIAL: 0.7 % (ref 0–1.5)
CHLORIDE BLD-SCNC: 101 MMOL/L (ref 99–110)
CLOT EVALUATION BAL: ABNORMAL
CO2: 27 MMOL/L (ref 21–32)
COLOR LAVAGE: COLORLESS
CREAT SERPL-MCNC: 1.1 MG/DL (ref 0.9–1.3)
EOSINOPHILS ABSOLUTE: 0 K/UL (ref 0–0.6)
EOSINOPHILS RELATIVE PERCENT: 0 %
ESTIMATED AVERAGE GLUCOSE: 119.8 MG/DL
GFR AFRICAN AMERICAN: >60
GFR NON-AFRICAN AMERICAN: >60
GLUCOSE BLD-MCNC: 116 MG/DL (ref 70–99)
GLUCOSE BLD-MCNC: 136 MG/DL (ref 70–99)
GLUCOSE BLD-MCNC: 140 MG/DL (ref 70–99)
GLUCOSE BLD-MCNC: 160 MG/DL (ref 70–99)
GLUCOSE BLD-MCNC: 163 MG/DL (ref 70–99)
GLUCOSE BLD-MCNC: 171 MG/DL (ref 70–99)
HBA1C MFR BLD: 5.8 %
HCO3 ARTERIAL: 19 MMOL/L (ref 21–29)
HCO3 ARTERIAL: 26.8 MMOL/L (ref 21–29)
HCT VFR BLD CALC: 41.9 % (ref 40.5–52.5)
HEMOGLOBIN, ART, EXTENDED: 14.6 G/DL (ref 13.5–17.5)
HEMOGLOBIN, ART, EXTENDED: 15.6 G/DL (ref 13.5–17.5)
HEMOGLOBIN: 13.5 G/DL (ref 13.5–17.5)
L. PNEUMOPHILA SEROGP 1 UR AG: NORMAL
LYMPHOCYTES ABSOLUTE: 0.8 K/UL (ref 1–5.1)
LYMPHOCYTES RELATIVE PERCENT: 4.7 %
LYMPHOCYTES, BAL: 2 % (ref 5–10)
MACROPHAGES, BAL: 16 % (ref 90–95)
MCH RBC QN AUTO: 30.1 PG (ref 26–34)
MCHC RBC AUTO-ENTMCNC: 32.2 G/DL (ref 31–36)
MCV RBC AUTO: 93.5 FL (ref 80–100)
METHEMOGLOBIN ARTERIAL: 0.3 %
METHEMOGLOBIN ARTERIAL: 0.3 %
MONOCYTES ABSOLUTE: 0.7 K/UL (ref 0–1.3)
MONOCYTES RELATIVE PERCENT: 4.1 %
NEUTROPHILS ABSOLUTE: 14.9 K/UL (ref 1.7–7.7)
NEUTROPHILS RELATIVE PERCENT: 91 %
NUMBER OF CELLS COUNTED BAL (LAVAGE): 100
O2 CONTENT ARTERIAL: 20 ML/DL
O2 CONTENT ARTERIAL: 21 ML/DL
O2 SAT, ARTERIAL: 95.5 %
O2 SAT, ARTERIAL: 96.3 %
O2 THERAPY: ABNORMAL
O2 THERAPY: ABNORMAL
ORGANISM: ABNORMAL
PCO2 ARTERIAL: 40.5 MMHG (ref 35–45)
PCO2 ARTERIAL: 60.6 MMHG (ref 35–45)
PDW BLD-RTO: 14.1 % (ref 12.4–15.4)
PERFORMED ON: ABNORMAL
PH ARTERIAL: 7.26 (ref 7.35–7.45)
PH ARTERIAL: 7.29 (ref 7.35–7.45)
PHOSPHORUS: 4.6 MG/DL (ref 2.5–4.9)
PLATELET # BLD: 232 K/UL (ref 135–450)
PMV BLD AUTO: 8 FL (ref 5–10.5)
PO2 ARTERIAL: 89.6 MMHG (ref 75–108)
PO2 ARTERIAL: 93.1 MMHG (ref 75–108)
POTASSIUM SERPL-SCNC: 4.7 MMOL/L (ref 3.5–5.1)
RBC # BLD: 4.48 M/UL (ref 4.2–5.9)
RBC, BAL: 700 /CUMM
SEGMENTED NEUTROPHILS, BAL: 82 % (ref 5–10)
SODIUM BLD-SCNC: 140 MMOL/L (ref 136–145)
STREP PNEUMONIAE ANTIGEN, URINE: NORMAL
TCO2 ARTERIAL: 20.2 MMOL/L
TCO2 ARTERIAL: 28.7 MMOL/L
THROAT CULTURE: ABNORMAL
THROAT CULTURE: ABNORMAL
VANCOMYCIN TROUGH: <4 UG/ML (ref 10–20)
WBC # BLD: 16.4 K/UL (ref 4–11)
WBC/EPI CELLS BAL: 654 /CUMM

## 2020-03-10 PROCEDURE — 80069 RENAL FUNCTION PANEL: CPT

## 2020-03-10 PROCEDURE — 87070 CULTURE OTHR SPECIMN AEROBIC: CPT

## 2020-03-10 PROCEDURE — 6370000000 HC RX 637 (ALT 250 FOR IP): Performed by: INTERNAL MEDICINE

## 2020-03-10 PROCEDURE — 36556 INSERT NON-TUNNEL CV CATH: CPT

## 2020-03-10 PROCEDURE — 6360000002 HC RX W HCPCS: Performed by: PHYSICIAN ASSISTANT

## 2020-03-10 PROCEDURE — 87205 SMEAR GRAM STAIN: CPT

## 2020-03-10 PROCEDURE — 6360000002 HC RX W HCPCS: Performed by: INTERNAL MEDICINE

## 2020-03-10 PROCEDURE — 6370000000 HC RX 637 (ALT 250 FOR IP): Performed by: PHYSICIAN ASSISTANT

## 2020-03-10 PROCEDURE — 2580000003 HC RX 258: Performed by: INTERNAL MEDICINE

## 2020-03-10 PROCEDURE — 82803 BLOOD GASES ANY COMBINATION: CPT

## 2020-03-10 PROCEDURE — 36415 COLL VENOUS BLD VENIPUNCTURE: CPT

## 2020-03-10 PROCEDURE — 3E1F88Z IRRIGATION OF RESPIRATORY TRACT USING IRRIGATING SUBSTANCE, VIA NATURAL OR ARTIFICIAL OPENING ENDOSCOPIC: ICD-10-PCS | Performed by: INTERNAL MEDICINE

## 2020-03-10 PROCEDURE — 51702 INSERT TEMP BLADDER CATH: CPT

## 2020-03-10 PROCEDURE — 0100U HC RESPIRPTHGN MULT REV TRANS & AMP PRB TECH 21 TRGT: CPT

## 2020-03-10 PROCEDURE — 80202 ASSAY OF VANCOMYCIN: CPT

## 2020-03-10 PROCEDURE — 71045 X-RAY EXAM CHEST 1 VIEW: CPT

## 2020-03-10 PROCEDURE — 85025 COMPLETE CBC W/AUTO DIFF WBC: CPT

## 2020-03-10 PROCEDURE — 31622 DX BRONCHOSCOPE/WASH: CPT

## 2020-03-10 PROCEDURE — 2580000003 HC RX 258: Performed by: PHYSICIAN ASSISTANT

## 2020-03-10 PROCEDURE — 02HV33Z INSERTION OF INFUSION DEVICE INTO SUPERIOR VENA CAVA, PERCUTANEOUS APPROACH: ICD-10-PCS | Performed by: INTERNAL MEDICINE

## 2020-03-10 PROCEDURE — 94640 AIRWAY INHALATION TREATMENT: CPT

## 2020-03-10 PROCEDURE — 31624 DX BRONCHOSCOPE/LAVAGE: CPT | Performed by: INTERNAL MEDICINE

## 2020-03-10 PROCEDURE — 0B9F8ZX DRAINAGE OF RIGHT LOWER LUNG LOBE, VIA NATURAL OR ARTIFICIAL OPENING ENDOSCOPIC, DIAGNOSTIC: ICD-10-PCS | Performed by: INTERNAL MEDICINE

## 2020-03-10 PROCEDURE — 89051 BODY FLUID CELL COUNT: CPT

## 2020-03-10 PROCEDURE — 94003 VENT MGMT INPAT SUBQ DAY: CPT

## 2020-03-10 PROCEDURE — 2500000003 HC RX 250 WO HCPCS: Performed by: INTERNAL MEDICINE

## 2020-03-10 PROCEDURE — 99291 CRITICAL CARE FIRST HOUR: CPT | Performed by: INTERNAL MEDICINE

## 2020-03-10 PROCEDURE — 94761 N-INVAS EAR/PLS OXIMETRY MLT: CPT

## 2020-03-10 PROCEDURE — 2700000000 HC OXYGEN THERAPY PER DAY

## 2020-03-10 PROCEDURE — 2000000000 HC ICU R&B

## 2020-03-10 PROCEDURE — 94750 HC PULMONARY COMPLIANCE STUDY: CPT

## 2020-03-10 PROCEDURE — 99233 SBSQ HOSP IP/OBS HIGH 50: CPT | Performed by: INTERNAL MEDICINE

## 2020-03-10 PROCEDURE — 31645 BRNCHSC W/THER ASPIR 1ST: CPT | Performed by: INTERNAL MEDICINE

## 2020-03-10 RX ORDER — FENTANYL CITRATE 50 UG/ML
25 INJECTION, SOLUTION INTRAMUSCULAR; INTRAVENOUS
Status: DISCONTINUED | OUTPATIENT
Start: 2020-03-10 | End: 2020-03-14

## 2020-03-10 RX ORDER — CHLORHEXIDINE GLUCONATE 0.12 MG/ML
15 RINSE ORAL 2 TIMES DAILY
Status: DISCONTINUED | OUTPATIENT
Start: 2020-03-10 | End: 2020-03-17

## 2020-03-10 RX ORDER — SODIUM CHLORIDE, SODIUM LACTATE, POTASSIUM CHLORIDE, CALCIUM CHLORIDE 600; 310; 30; 20 MG/100ML; MG/100ML; MG/100ML; MG/100ML
INJECTION, SOLUTION INTRAVENOUS
Status: DISCONTINUED
Start: 2020-03-10 | End: 2020-03-10

## 2020-03-10 RX ORDER — BUPROPION HYDROCHLORIDE 75 MG/1
150 TABLET ORAL 2 TIMES DAILY
Status: DISCONTINUED | OUTPATIENT
Start: 2020-03-10 | End: 2020-03-15

## 2020-03-10 RX ORDER — LIDOCAINE HYDROCHLORIDE 40 MG/ML
SOLUTION TOPICAL
Status: DISPENSED
Start: 2020-03-10 | End: 2020-03-10

## 2020-03-10 RX ORDER — MIDAZOLAM HYDROCHLORIDE 1 MG/ML
2 INJECTION INTRAMUSCULAR; INTRAVENOUS
Status: DISCONTINUED | OUTPATIENT
Start: 2020-03-10 | End: 2020-03-14

## 2020-03-10 RX ORDER — SODIUM CHLORIDE, SODIUM LACTATE, POTASSIUM CHLORIDE, AND CALCIUM CHLORIDE .6; .31; .03; .02 G/100ML; G/100ML; G/100ML; G/100ML
500 INJECTION, SOLUTION INTRAVENOUS ONCE
Status: COMPLETED | OUTPATIENT
Start: 2020-03-10 | End: 2020-03-10

## 2020-03-10 RX ORDER — MIDAZOLAM HYDROCHLORIDE 1 MG/ML
INJECTION INTRAMUSCULAR; INTRAVENOUS
Status: DISCONTINUED
Start: 2020-03-10 | End: 2020-03-10

## 2020-03-10 RX ADMIN — PROPOFOL 50 MCG/KG/MIN: 10 INJECTION, EMULSION INTRAVENOUS at 17:43

## 2020-03-10 RX ADMIN — VANCOMYCIN HYDROCHLORIDE 1750 MG: 10 INJECTION, POWDER, LYOPHILIZED, FOR SOLUTION INTRAVENOUS at 23:14

## 2020-03-10 RX ADMIN — SODIUM CHLORIDE, POTASSIUM CHLORIDE, SODIUM LACTATE AND CALCIUM CHLORIDE 500 ML: 600; 310; 30; 20 INJECTION, SOLUTION INTRAVENOUS at 00:45

## 2020-03-10 RX ADMIN — PAROXETINE HYDROCHLORIDE 40 MG: 20 TABLET, FILM COATED ORAL at 17:35

## 2020-03-10 RX ADMIN — BUPROPION HYDROCHLORIDE 150 MG: 75 TABLET, FILM COATED ORAL at 21:15

## 2020-03-10 RX ADMIN — Medication 10 ML: at 08:28

## 2020-03-10 RX ADMIN — PROPOFOL 50 MCG/KG/MIN: 10 INJECTION, EMULSION INTRAVENOUS at 03:59

## 2020-03-10 RX ADMIN — AZITHROMYCIN DIHYDRATE 500 MG: 500 INJECTION, POWDER, LYOPHILIZED, FOR SOLUTION INTRAVENOUS at 12:54

## 2020-03-10 RX ADMIN — FAMOTIDINE 20 MG: 10 INJECTION INTRAVENOUS at 21:15

## 2020-03-10 RX ADMIN — NOREPINEPHRINE BITARTRATE 2 MCG/MIN: 1 INJECTION INTRAVENOUS at 01:18

## 2020-03-10 RX ADMIN — PROPOFOL 50 MCG/KG/MIN: 10 INJECTION, EMULSION INTRAVENOUS at 15:17

## 2020-03-10 RX ADMIN — GABAPENTIN 100 MG: 100 CAPSULE ORAL at 15:20

## 2020-03-10 RX ADMIN — NOREPINEPHRINE BITARTRATE 0.01 MCG/MIN: 1 INJECTION INTRAVENOUS at 00:47

## 2020-03-10 RX ADMIN — BUPROPION HYDROCHLORIDE 150 MG: 75 TABLET, FILM COATED ORAL at 09:55

## 2020-03-10 RX ADMIN — GABAPENTIN 100 MG: 100 CAPSULE ORAL at 08:28

## 2020-03-10 RX ADMIN — Medication 4 PUFF: at 03:24

## 2020-03-10 RX ADMIN — PROPOFOL 50 MCG/KG/MIN: 10 INJECTION, EMULSION INTRAVENOUS at 09:55

## 2020-03-10 RX ADMIN — Medication 2 PUFF: at 15:33

## 2020-03-10 RX ADMIN — FAMOTIDINE 20 MG: 10 INJECTION INTRAVENOUS at 09:55

## 2020-03-10 RX ADMIN — PROPOFOL 50 MCG/KG/MIN: 10 INJECTION, EMULSION INTRAVENOUS at 01:06

## 2020-03-10 RX ADMIN — FENTANYL CITRATE 50 MCG/HR: 50 INJECTION INTRAVENOUS at 12:54

## 2020-03-10 RX ADMIN — Medication 4 PUFF: at 15:33

## 2020-03-10 RX ADMIN — CHLORHEXIDINE GLUCONATE 0.12% ORAL RINSE 15 ML: 1.2 LIQUID ORAL at 21:15

## 2020-03-10 RX ADMIN — PROPOFOL 50 MCG/KG/MIN: 10 INJECTION, EMULSION INTRAVENOUS at 20:02

## 2020-03-10 RX ADMIN — METHYLPREDNISOLONE SODIUM SUCCINATE 40 MG: 40 INJECTION, POWDER, FOR SOLUTION INTRAMUSCULAR; INTRAVENOUS at 08:28

## 2020-03-10 RX ADMIN — Medication 2 PUFF: at 11:31

## 2020-03-10 RX ADMIN — Medication 2 PUFF: at 07:13

## 2020-03-10 RX ADMIN — CHLORHEXIDINE GLUCONATE 0.12% ORAL RINSE 15 ML: 1.2 LIQUID ORAL at 09:55

## 2020-03-10 RX ADMIN — MUPIROCIN: 20 OINTMENT TOPICAL at 10:21

## 2020-03-10 RX ADMIN — Medication 2 PUFF: at 19:11

## 2020-03-10 RX ADMIN — Medication 4 PUFF: at 07:13

## 2020-03-10 RX ADMIN — Medication 2 PUFF: at 03:23

## 2020-03-10 RX ADMIN — PIPERACILLIN SODIUM AND TAZOBACTAM SODIUM 3.38 G: 3; .375 INJECTION, POWDER, LYOPHILIZED, FOR SOLUTION INTRAVENOUS at 00:46

## 2020-03-10 RX ADMIN — Medication 4 PUFF: at 11:31

## 2020-03-10 RX ADMIN — VANCOMYCIN HYDROCHLORIDE 2000 MG: 1 INJECTION, POWDER, LYOPHILIZED, FOR SOLUTION INTRAVENOUS at 10:57

## 2020-03-10 RX ADMIN — FENTANYL CITRATE 50 MCG/HR: 50 INJECTION INTRAVENOUS at 09:56

## 2020-03-10 RX ADMIN — Medication 2 PUFF: at 23:02

## 2020-03-10 RX ADMIN — MUPIROCIN: 20 OINTMENT TOPICAL at 21:15

## 2020-03-10 RX ADMIN — GABAPENTIN 100 MG: 100 CAPSULE ORAL at 21:15

## 2020-03-10 RX ADMIN — BUPROPION HYDROCHLORIDE 150 MG: 150 TABLET, EXTENDED RELEASE ORAL at 08:28

## 2020-03-10 RX ADMIN — METHYLPREDNISOLONE SODIUM SUCCINATE 40 MG: 40 INJECTION, POWDER, FOR SOLUTION INTRAMUSCULAR; INTRAVENOUS at 00:48

## 2020-03-10 RX ADMIN — Medication 4 PUFF: at 23:02

## 2020-03-10 RX ADMIN — PROPOFOL 50 MCG/KG/MIN: 10 INJECTION, EMULSION INTRAVENOUS at 07:30

## 2020-03-10 RX ADMIN — Medication 10 ML: at 21:16

## 2020-03-10 RX ADMIN — CEFTRIAXONE 1 G: 1 INJECTION, POWDER, FOR SOLUTION INTRAMUSCULAR; INTRAVENOUS at 10:21

## 2020-03-10 RX ADMIN — Medication 4 PUFF: at 19:11

## 2020-03-10 RX ADMIN — ENOXAPARIN SODIUM 40 MG: 40 INJECTION SUBCUTANEOUS at 17:35

## 2020-03-10 RX ADMIN — METHYLPREDNISOLONE SODIUM SUCCINATE 40 MG: 40 INJECTION, POWDER, FOR SOLUTION INTRAMUSCULAR; INTRAVENOUS at 21:15

## 2020-03-10 RX ADMIN — PROPOFOL 45 MCG/KG/MIN: 10 INJECTION, EMULSION INTRAVENOUS at 23:13

## 2020-03-10 RX ADMIN — Medication 10 ML: at 01:22

## 2020-03-10 ASSESSMENT — PULMONARY FUNCTION TESTS
PIF_VALUE: 16
PIF_VALUE: 16
PIF_VALUE: 27
PIF_VALUE: 15
PIF_VALUE: 25
PIF_VALUE: 15
PIF_VALUE: 14
PIF_VALUE: 17
PIF_VALUE: 16
PIF_VALUE: 15
PIF_VALUE: 35
PIF_VALUE: 19
PIF_VALUE: 16
PIF_VALUE: 36
PIF_VALUE: 15
PIF_VALUE: 33
PIF_VALUE: 30
PIF_VALUE: 16
PIF_VALUE: 19
PIF_VALUE: 19
PIF_VALUE: 33
PIF_VALUE: 21
PIF_VALUE: 28
PIF_VALUE: 15
PIF_VALUE: 37
PIF_VALUE: 17
PIF_VALUE: 32
PIF_VALUE: 15
PIF_VALUE: 15
PIF_VALUE: 16
PIF_VALUE: 10
PIF_VALUE: 19
PIF_VALUE: 30
PIF_VALUE: 17
PIF_VALUE: 23
PIF_VALUE: 28
PIF_VALUE: 15
PIF_VALUE: 35
PIF_VALUE: 17

## 2020-03-10 NOTE — PROGRESS NOTES
greater than 35 breaths per minute. Therapy will be held for heart rate (HR) greater than 140 beats per minute, pending direction from physician. 3. Bronchodilators will be administered via Metered Dose Inhaler (MDI) with spacer when the following criteria are met:  a. Alert and cooperative     b. HR < 140 bpm  c. RR < 30 bpm                d. Can demonstrate a 2-3 second inspiratory hold  4. Bronchodilators will be administered via Hand Held Nebulizer HAYLEE [facility]) to patients when ANY of the following criteria are met  a. Incognizant or uncooperative          b. Patients treated with HHN at Home        c. Unable to demonstrate proper use of MDI with spacer     d. RR > 30 bpm   5. Bronchodilators will be delivered via Metered Dose Inhaler (MDI), HHN, Aerogen to intubated patients on mechanical ventilation. 6. Inhalation medication orders will be delivered and/or substituted as outlined below. Aerosolized Medications Ordering and Administration Guidelines:    1. All Medications will be ordered by a physician, and their frequency and/or modality will be adjusted as defined by the patients Respiratory Severity Index (RSI) score. 2. If the patient does not have documented COPD, consider discontinuing anticholinergics when RSI is less than 9.  3. If the bronchospasm worsens (increased RSI), then the bronchodilator frequency can be increased to a maximum of every 4 hours. If greater than every 4 hours is required, the physician will be contacted. 4. If the bronchospasm improves, the frequency of the bronchodilator can be decreased, based on the patient's RSI, but not less than home treatment regimen frequency. 5. Bronchodilator(s) will be discontinued if patient has a RSI less than 9 and has received no scheduled or as needed treatment for 72  Hrs. Patients Ordered on a Mucolytic Agent:    1. Must always be administered with a bronchodilator.     2. Discontinue if patient experiences worsened bronchospasm, or secretions have lessened to the point that the patient is able to clear them with a cough. Anti-inflammatory and Combination Medications:    1. If the patient lacks prior history of lung disease, is not using inhaled anti-inflammatory medication at home, and lacks wheezing by examination or by history for at least 24 hours, contact physician for possible discontinuation.

## 2020-03-10 NOTE — CONSULTS
TRANSFORAMINAL EPIDURAL STEROID INJECTION SITE CONFIRMED BY FLUOROSCOPY performed by Keven Garsia MD at [address] History  family history includes Heart Disease in his mother; High Blood Pressure in his sister; Liver Disease in his father; No Known Problems in his sister; Other in his mother. Social History:  reports that he has been smoking. He started smoking about 46 years ago. He has a 70.00 pack-year smoking history. He has never used smokeless tobacco.   reports no history of alcohol use. ALLERGIES:  Patient is allergic to codeine and dilaudid [hydromorphone hcl]. Continuous Infusions:   lactated ringers      norepinephrine 2 mcg/min (03/10/20 0118)    sodium chloride      propofol 50 mcg/kg/min (03/10/20 0730)    fentaNYL (SUBLIMAZE) infusion 50 mcg/hr (03/10/20 6346)     Scheduled Meds:   midazolam        buPROPion  150 mg Oral BID    gabapentin  100 mg Oral TID    PARoxetine  40 mg Oral QPM    sodium chloride flush  10 mL Intravenous 2 times per day    enoxaparin  40 mg Subcutaneous Daily    methylPREDNISolone  40 mg Intravenous Q12H    Followed by   Amilcar Person ON 3/12/2020] predniSONE  40 mg Oral Daily    influenza virus vaccine  0.5 mL Intramuscular Once    nicotine  1 patch Transdermal Daily    piperacillin-tazobactam  3.375 g Intravenous Q8H    azithromycin  500 mg Intravenous Q24H    albuterol sulfate HFA  2 puff Inhalation Q4H    ipratropium  4 puff Inhalation Q4H     PRN Meds:  sodium chloride flush, acetaminophen **OR** acetaminophen, polyethylene glycol, promethazine **OR** ondansetron, albuterol    REVIEW OF SYSTEMS:  Pt unable to answer    PHYSICAL EXAM: BP (!) 110/59   Pulse 81   Temp 98.7 °F (37.1 °C) (Axillary)   Resp 20   Ht 5' 5\" (1.651 m)   Wt 286 lb 3.2 oz (129.8 kg)   SpO2 96%   BMI 47.63 kg/m²  on vent  Constitutional:  No acute distress. Eyes: PERRL. Conjunctivae anicteric. ENT: Normal nose. Normal tongue.     Neck:  Trachea is degrees or higher at all times  Daily spontaneous breathing trial once PEEP less than 8, FiO2 less than 55%. · Vancomycin, Rocephin and Azithromycin  · Solumedrol 40mg Iv BID  · Conservative MIVF with LR  · Start TFs  · Bronchoscopy for BAL  · Resp PCR  · SSI  · Pepcid, Lovenox SQ  · Due to at least single organ failure and risk of rapid deterioration, I spent 40 minutes of Critical care time reviewing labs/films, examining patient, collaborating with other physicians. This does not include time performing critical procedures.     Thank you Mikhail Rocha MD for this consult

## 2020-03-10 NOTE — PROGRESS NOTES
Dr Meg Hidalgo called stated to decrease fentanyl to 50 mcg and decrease Fi02 to 40%. Respiratory notified.  Electronically signed by Tyler Brown RN on 3/10/2020 at 6:44 AM

## 2020-03-10 NOTE — PROCEDURES
Procedure- Right IJV vein central line placement under US guidance. EBL- Minimal.    Indication- Severe sepsis needing iv vasopressor agents. Informed consent- obtained from the wife. Anesthetic- 1% lidocaine. Procedure details- under all aseptic precautions, the right IJ vein was accessed in the first attempt with the #20 needle, under US guidance and a guide wire was introduced into the vein without any difficulty. The catheter was advanced into the IJ vein without any difficulty after dilating the skin tract. Ports aspirated and flushed. Sterile suture applied and sterile dressing given. Stat portable CXR obtained to confirm placement.

## 2020-03-10 NOTE — SIGNIFICANT EVENT
Time out by Dr Anuj Burciaga - all equipment at bedside and roles assigned with understanding by each team member.  Dr Leesa Castillo, RT; Javon ruiz, RT; Reji Akbar, RN,; Mart Stark, RN; marcy Funes, CAMILO and Amanda Rahman, RN    100 mg leyla administered at 2149 by Dr Anuj Burciaga  Propofol 100 mg and ketamine 100 mg Dr Anuj Burciaga administered at 2149:30    50 seconds pause before removing bipap for intubation    Intubated #8 ETT at 25 lip line by Dr Anuj Burciaga at 2151, pt bagging started  Good color change of ETCO2; positive bilateral lung sounds, review of vocal chord visualization also confirmed by Dr Anuj Burciaga and RT     65 lip line of OG placed by Dr Anuj Burciaga at 2154

## 2020-03-10 NOTE — PROGRESS NOTES
Dr Joaquin Gerard at bedside, review possible need for intubation with pt and dtr. Spouse in waiting room. Spouse at bedside and Dr Joaquin Gerard explained need for intubation to pt and spouse - verbal understanding received.

## 2020-03-10 NOTE — PROCEDURES
Procedure- Emergency VL assisted RSI. Indications- Worsening respiratory distress on BiPAP with RR>50 times/minute. Drugs- Ketofol 100/100 Rocuronium 100 mg. Preoxygenation- 99 percent with BiPAP and continuous apneic oxygenation with NC. Position 45 degree hob elevation. ASA 4. Mallampatti- 2. Details- Sedation and paralysis induced after adequate preoxygenation and proper positioning. VC visualized with S4 blade and intubated in the first attempt with 8 FR ETT. Secured at 24 cm at the upper incisor. Capnographic confirmation of placement obtained. Stat portable CXR ordered. Mechanical ventilation initiaed.

## 2020-03-10 NOTE — PROGRESS NOTES
03/09/20 2211   Vent Information   $Ventilation $Initial Day   Ventilator Started Yes   Skin Assessment Clean, dry, & intact   Vent Type 840   Vent Mode AC/VC   Vt Ordered 400 mL   Rate Set 14 bmp   Peak Flow 60 L/min   Pressure Support 0 cmH20   FiO2  60 %   Sensitivity 3   PEEP/CPAP 5   I Time/ I Time % 0 s   Vent Patient Data   High Peep/I Pressure 0   Peak Inspiratory Pressure 27 cmH2O   Mean Airway Pressure 8.8 cmH20   Rate Measured 14 br/min   Vt Exhaled 350 mL   Minute Volume 4.91 Liters   I:E Ratio 1:5.00   Cough/Sputum   Sputum How Obtained Endotracheal;Suctioned   Cough Non-productive   Sputum Amount None   Spontaneous Breathing Trial (SBT) RT Doc   Pulse 101   SpO2 92 %   Breath Sounds   Right Upper Lobe Diminished   Right Middle Lobe Diminished   Right Lower Lobe Diminished   Left Upper Lobe Diminished   Left Lower Lobe Diminished   Additional Respiratory  Assessments   Resp 24   Position Semi-Loera's   Alarm Settings   High Pressure Alarm 40 cmH2O   Low Minute Volume Alarm 4 L/min   Apnea (secs) 20 secs   High Respiratory Rate 40 br/min   Low Exhaled Vt  300 mL   ETT (adult)   Placement Date: 03/09/20   Tube Size: 8 mm   $ Intubation emergent  $ Yes   Secured at 25 cm   Measured From [address] [place] [address],[address] By [employer] tube castro   Site Condition Dry

## 2020-03-10 NOTE — PROCEDURES
See History and Physical or progress note for additional findings. Pertinent changes recorded below if present. Pre/post procedure diagnosis: PNA    Allergies and medications have been reviewed    HENT: Airway patent and reviewed. ETT in place. Cardiovascular: Normal rate, regular rhythm, normal heart sounds. Pulmonary/Chest: No wheezes. No rhonchi. No rales. Abdominal: Soft. Bowel sounds are normal. No distension. ASA: Class 4 - A patient with an incapacitating systemic disease that is a constant threat to life  Level of Sedation Plan: Continue ICU sedation    Post Procedure Plan   Continue ICU care.   ______________________     The risks and benefits as well as alternatives to the procedure have been discussed with the POA. The  POA understands and agrees to proceed. Signed Consent in chart. PROCEDURE:  BRONCHOSCOPY      The risks and benefits as well as alternatives to the procedure have been discussed with the patient or POA. The patient or POA understands and agrees to proceed. Consent signed. DESCRIPTION OF PROCEDURE: A time out was taken. ICU sedation continued. 4ml 1% lidocaine through bronchoscope. The scope was passed with ease via the ETT. A complete airway inspection was performed. Normal anatomy. All airways were narrowed due to edema. No endobronchial lesion was identified. Mucosa appeared inflamed and edematous. There were thck white secretions throughout the airways. Therapeutic aspiration completed. Washings were obtained throughout the airways. A Bronchoalveolar lavage was obtained from the RLL with good return. The patient tolerated the procedure well. EBL none. Recovery will be per endoscopy protocol. Will discharge home or return to same level of care per recovery protocol. FOLLOW UP:  Cultures and cytology in three to five days.

## 2020-03-10 NOTE — PROGRESS NOTES
03/09/20 2022   NIV Type   $NIV $Daily Charge   Skin Assessment Clean, dry, & intact   Skin Protection for O2 Device Yes   Mode Bilevel   Mask Type Full face mask   Mask Size Medium   Settings/Measurements   IPAP 16 cmH20   CPAP/EPAP 8 cmH2O   Rate Ordered 12   Resp 23   FiO2  45 %   I Time/ I Time % 0.9 s   Vt Exhaled 885 mL   Minute Volume 14 Liters   Mask Leak (lpm) 50 lpm   Comfort Level Good   Using Accessory Muscles No   SpO2 96

## 2020-03-10 NOTE — PROGRESS NOTES
03/09/20 2350   Vent Information   Vent Type 840   Vent Mode AC/VC   Vt Ordered 400 mL   Rate Set 14 bmp   Peak Flow 60 L/min   Pressure Support 0 cmH20   FiO2  50 %  (decreased fio2 from 60 to 50%)   Sensitivity 3   PEEP/CPAP 5   I Time/ I Time % 0 s   Vent Patient Data   High Peep/I Pressure 0   Peak Inspiratory Pressure 17 cmH2O   Mean Airway Pressure 8.4 cmH20   Rate Measured 25 br/min   Vt Exhaled 400 mL   Minute Volume 10 Liters   I:E Ratio 1:2.60   Cough/Sputum   Sputum How Obtained Suctioned;Endotracheal   Cough Non-productive   Sputum Amount None   Spontaneous Breathing Trial (SBT) RT Doc   Pulse 109   SpO2 92 %   Breath Sounds   Right Upper Lobe Expiratory Wheezes   Right Middle Lobe Expiratory Wheezes   Right Lower Lobe Diminished   Left Upper Lobe Expiratory Wheezes   Left Lower Lobe Diminished   Additional Respiratory  Assessments   Resp 29   Position Semi-Loera's   Alarm Settings   High Pressure Alarm 40 cmH2O   Low Minute Volume Alarm 4 L/min   Apnea (secs) 20 secs   High Respiratory Rate 40 br/min   Low Exhaled Vt  300 mL   ETT (adult)   Placement Date: 03/09/20   Tube Size: 8 mm   Secured at 25 cm   Measured From [address] [place] [address],[address] By [employer] tube castro   Site Condition Dry

## 2020-03-10 NOTE — PROGRESS NOTES
sclera icterus. No conjunctiva injected. ENT: No discharge. Intubated. Neck: Trachea midline. Normal thyroid. Resp: No accessory muscle use. No crackles. + wheezing. No rhonchi. No dullness on percussion. Diminished breath sounds at the bases  CV: Regular rate. Regular rhythm. No mumur or rub. No edema. No JVD. Palpable pedal pulses. GI: Non-tender. Non-distended. No masses. No organmegaly. Normal bowel sounds. No hernia. Skin: Warm and dry. No nodule on exposed extremities. No rash on exposed extremities. Lymph: No cervical LAD. No supraclavicular LAD. M/S: No cyanosis. No joint deformity. No clubbing. Neuro: SAMMIE  Psych: SAMMIE.      Medications:  Scheduled Meds:   mupirocin   Nasal BID    chlorhexidine  15 mL Mouth/Throat BID    famotidine (PEPCID) injection  20 mg Intravenous BID    cefTRIAXone (ROCEPHIN) IV  1 g Intravenous Q24H    vancomycin  2,000 mg Intravenous Once    buPROPion  150 mg Oral BID    lidocaine        gabapentin  100 mg Oral TID    PARoxetine  40 mg Oral QPM    sodium chloride flush  10 mL Intravenous 2 times per day    enoxaparin  40 mg Subcutaneous Daily    methylPREDNISolone  40 mg Intravenous Q12H    Followed by   [employer] ON 3/12/2020] predniSONE  40 mg Oral Daily    influenza virus vaccine  0.5 mL Intramuscular Once    nicotine  1 patch Transdermal Daily    azithromycin  500 mg Intravenous Q24H    albuterol sulfate HFA  2 puff Inhalation Q4H    ipratropium  4 puff Inhalation Q4H       PRN Meds:  midazolam, fentanNYL, sodium chloride flush, acetaminophen **OR** acetaminophen, polyethylene glycol, promethazine **OR** ondansetron, albuterol    Results:  CBC:   Recent Labs     03/09/20  1006   WBC 12.0*   HGB 15.6   HCT 47.2   MCV 93.6        BMP:   Recent Labs     03/09/20  1006 03/10/20  0900    140   K 3.9 4.7    101   CO2 24 27   PHOS  --  4.6   BUN 16 25*   CREATININE 0.8* 1.1     LIVER PROFILE: No results for input(s): AST, ALT, LIPASE,

## 2020-03-10 NOTE — PROGRESS NOTES
Dr. Edilia Kate at bedside, decision to intubate at this time. Pt and pt wife in agreement.  Electronically signed by Sammie Nunes RN on 3/9/2020 at 9:43 PM

## 2020-03-10 NOTE — PLAN OF CARE
Nutrition Problem: Inadequate oral intake  Intervention: Food and/or Nutrient Delivery: Continue NPO, Start Tube Feeding  Nutritional Goals: patient will remain in NPO status until he is medically cleared to receive EN while intubated

## 2020-03-10 NOTE — PROGRESS NOTES
Nutrition Assessment    Type and Reason for Visit: Initial, Consult(consult for TF ordering and management)    Nutrition Recommendations:   1. TF recommendations - Vital High-Protein (\"low calorie, high-protein\" formula in Epic) with a goal rate of 10 ml/hr x 20 hours d/t high propofol amount/kcals from propofol. Continue rate at 10 ml/hr x 20 hours until propofol is weaned down and RD adjusts TF goal rate. Water flushes, 60 ml every 6 hours or per MD guidance. Please administer one proteinex P2GO TWICE daily via feeding tube - flush tube well before and after administration. 2. Monitor TF start, rate, intake, and tolerance + water flushes + administration of one proteinex P2GO TWICE daily. 3. Monitor vent status, sedation type/amount, and check triglycerides on 3/12/20, if not checked prior to this date (if patient is still receiving propofol for sedation). 4. Monitor nutrition-related labs, bowel function, and weight trends. Nutrition Assessment: patient is nutritionally compromised AEB decreased appetite/po intake PTA r/t respiratory dysfunction/multi-focal pneumonia and he is at risk for further compromise d/t need for intubation and NPO status; will provide EN recommendations (to be started as appropriate)    Malnutrition Assessment:  · Malnutrition Status: At risk for malnutrition  · Context: Acute illness or injury  · Findings of the 6 clinical characteristics of malnutrition (Minimum of 2 out of 6 clinical characteristics is required to make the diagnosis of moderate or severe Protein Calorie Malnutrition based on AND/ASPEN Guidelines):  1. Energy Intake-Unable to assess(patient intubated and sedated), Unable to assess(patient intubated and sedated)    2. Weight Loss-No significant weight loss, in 1 year  3. Fat Loss-No significant subcutaneous fat loss,    4. Muscle Loss-No significant muscle mass loss,    5. Fluid Accumulation-No significant fluid accumulation,    6.   Strength-Not measured    Nutrition Risk Level: High    Nutrient Needs:  · Estimated Daily Total Kcal: 1040 - 1430 kcals based on 8-11 kcals/kg/CBW  · Estimated Daily Protein (g): 155 - 167 g protein based on 2.5-2.7 g/kg/IBW  · Estimated Daily Total Fluid (ml/day): 1000 - 1400 ml     Nutrition Diagnosis:   · Problem: Inadequate oral intake  · Etiology: related to Impaired respiratory function-inability to consume food, Insufficient energy/nutrient consumption     Signs and symptoms:  as evidenced by NPO status due to medical condition, Intubation    Objective Information:  · Nutrition-Focused Physical Findings: patient is intubated (on 3/9/20, late evening) and sedated on 50 mcg propofol x 24 hours; hx of respiratory dysfunction PTA and diagnosed with multi-focal pneumonia upon admission; he smokes 3 ppd typically but he has not smoked that much within the past week PTA d/t increased SOB; patient is morbidly obese; + edentulism; abdomen is round + taut (patient baseline) + bowel sounds are active; skin color is appropriate for ethnicity  · Wound Type: None  · Current Nutrition Therapies:  · Oral Diet Orders: NPO   · Oral Diet intake: NPO  · Oral Nutrition Supplement (ONS) Orders: None  · ONS intake: NPO  · Anthropometric Measures:  · Ht: 5' 5\" (165.1 cm)   · Current Body Wt: 286 lb 3 oz (129.8 kg)(actual; bed scale)  · Admission Body Wt: 283 lb (128.4 kg)(actual; standing scale)  · Usual Body Wt: (260's to 270's, per past weight hx)  · Weight Change:  + 3# weight gain x 1 day admission  · Ideal Body Wt: 136 lb (61.7 kg), % Ideal Body 210%   · BMI Classification: BMI > or equal to 40.0 Obese Class III(47.7)    Nutrition Interventions:   Continue NPO, Start Tube Feeding  Continued Inpatient Monitoring, Coordination of Care, Coordination of Community Care    Nutrition Evaluation:   · Evaluation: Goals set   · Goals: patient will remain in NPO status until he is medically cleared to receive EN while intubated    · Monitoring:

## 2020-03-10 NOTE — PROGRESS NOTES
Hand off report received from [facility]. Pt is alert and oriented. Placed on bipap by respiratory upon arrival to unit. Pt noted with elevated b/p will follow-up. Skin assessment complete; see 4eyes assessment.  Electronically signed by Yonatan Oliveros RN on 3/9/2020 at 8:38 PM

## 2020-03-11 ENCOUNTER — APPOINTMENT (OUTPATIENT)
Dept: GENERAL RADIOLOGY | Age: 60
DRG: 208 | End: 2020-03-11
Payer: COMMERCIAL

## 2020-03-11 LAB
ANION GAP SERPL CALCULATED.3IONS-SCNC: 8 MMOL/L (ref 3–16)
BASE EXCESS ARTERIAL: -0.1 MMOL/L (ref -3–3)
BASE EXCESS ARTERIAL: -3.9 MMOL/L (ref -3–3)
BASOPHILS ABSOLUTE: 0 K/UL (ref 0–0.2)
BASOPHILS RELATIVE PERCENT: 0 %
BUN BLDV-MCNC: 24 MG/DL (ref 7–20)
CALCIUM SERPL-MCNC: 8.4 MG/DL (ref 8.3–10.6)
CARBOXYHEMOGLOBIN ARTERIAL: 0.4 % (ref 0–1.5)
CARBOXYHEMOGLOBIN ARTERIAL: 0.6 % (ref 0–1.5)
CHLORIDE BLD-SCNC: 102 MMOL/L (ref 99–110)
CO2: 28 MMOL/L (ref 21–32)
CREAT SERPL-MCNC: 0.9 MG/DL (ref 0.9–1.3)
CULTURE, RESPIRATORY: NORMAL
EOSINOPHILS ABSOLUTE: 0 K/UL (ref 0–0.6)
EOSINOPHILS RELATIVE PERCENT: 0 %
GFR AFRICAN AMERICAN: >60
GFR NON-AFRICAN AMERICAN: >60
GLUCOSE BLD-MCNC: 150 MG/DL (ref 70–99)
GLUCOSE BLD-MCNC: 155 MG/DL (ref 70–99)
GLUCOSE BLD-MCNC: 163 MG/DL (ref 70–99)
GLUCOSE BLD-MCNC: 165 MG/DL (ref 70–99)
GLUCOSE BLD-MCNC: 166 MG/DL (ref 70–99)
GLUCOSE BLD-MCNC: 169 MG/DL (ref 70–99)
GRAM STAIN RESULT: NORMAL
HCO3 ARTERIAL: 22.4 MMOL/L (ref 21–29)
HCO3 ARTERIAL: 26.3 MMOL/L (ref 21–29)
HCT VFR BLD CALC: 40.9 % (ref 40.5–52.5)
HEMOGLOBIN, ART, EXTENDED: 13.1 G/DL (ref 13.5–17.5)
HEMOGLOBIN, ART, EXTENDED: 15 G/DL (ref 13.5–17.5)
HEMOGLOBIN: 13.4 G/DL (ref 13.5–17.5)
INFLUENZA A ANTIBODY IGG: 3.84 IV
INFLUENZA A ANTIBODY IGM: 0.1 IV
INFLUENZA B ANTIBODY, IGG: 0.9 IV
INFLUENZA B ANTIBODY, IGM: 0.07 IV
LYMPHOCYTES ABSOLUTE: 0.8 K/UL (ref 1–5.1)
LYMPHOCYTES RELATIVE PERCENT: 4.8 %
MCH RBC QN AUTO: 31 PG (ref 26–34)
MCHC RBC AUTO-ENTMCNC: 32.7 G/DL (ref 31–36)
MCV RBC AUTO: 95 FL (ref 80–100)
METHEMOGLOBIN ARTERIAL: 0.3 %
METHEMOGLOBIN ARTERIAL: 0.3 %
MONOCYTES ABSOLUTE: 0.9 K/UL (ref 0–1.3)
MONOCYTES RELATIVE PERCENT: 5.8 %
NEUTROPHILS ABSOLUTE: 14.3 K/UL (ref 1.7–7.7)
NEUTROPHILS RELATIVE PERCENT: 89.4 %
O2 CONTENT ARTERIAL: 18 ML/DL
O2 CONTENT ARTERIAL: 20 ML/DL
O2 SAT, ARTERIAL: 94.1 %
O2 SAT, ARTERIAL: 96.6 %
O2 THERAPY: ABNORMAL
O2 THERAPY: ABNORMAL
PCO2 ARTERIAL: 45.5 MMHG (ref 35–45)
PCO2 ARTERIAL: 49.7 MMHG (ref 35–45)
PDW BLD-RTO: 14.1 % (ref 12.4–15.4)
PERFORMED ON: ABNORMAL
PH ARTERIAL: 7.31 (ref 7.35–7.45)
PH ARTERIAL: 7.34 (ref 7.35–7.45)
PLATELET # BLD: 215 K/UL (ref 135–450)
PMV BLD AUTO: 8.5 FL (ref 5–10.5)
PO2 ARTERIAL: 74.8 MMHG (ref 75–108)
PO2 ARTERIAL: 94.4 MMHG (ref 75–108)
POTASSIUM REFLEX MAGNESIUM: 5.1 MMOL/L (ref 3.5–5.1)
RBC # BLD: 4.31 M/UL (ref 4.2–5.9)
SODIUM BLD-SCNC: 138 MMOL/L (ref 136–145)
TCO2 ARTERIAL: 23.8 MMOL/L
TCO2 ARTERIAL: 27.9 MMOL/L
WBC # BLD: 16 K/UL (ref 4–11)

## 2020-03-11 PROCEDURE — 99291 CRITICAL CARE FIRST HOUR: CPT | Performed by: INTERNAL MEDICINE

## 2020-03-11 PROCEDURE — 6370000000 HC RX 637 (ALT 250 FOR IP): Performed by: PHYSICIAN ASSISTANT

## 2020-03-11 PROCEDURE — 6370000000 HC RX 637 (ALT 250 FOR IP): Performed by: INTERNAL MEDICINE

## 2020-03-11 PROCEDURE — 85025 COMPLETE CBC W/AUTO DIFF WBC: CPT

## 2020-03-11 PROCEDURE — 6360000002 HC RX W HCPCS: Performed by: PHYSICIAN ASSISTANT

## 2020-03-11 PROCEDURE — 99233 SBSQ HOSP IP/OBS HIGH 50: CPT | Performed by: INTERNAL MEDICINE

## 2020-03-11 PROCEDURE — 6360000002 HC RX W HCPCS: Performed by: INTERNAL MEDICINE

## 2020-03-11 PROCEDURE — 94761 N-INVAS EAR/PLS OXIMETRY MLT: CPT

## 2020-03-11 PROCEDURE — 2580000003 HC RX 258: Performed by: INTERNAL MEDICINE

## 2020-03-11 PROCEDURE — 2500000003 HC RX 250 WO HCPCS: Performed by: INTERNAL MEDICINE

## 2020-03-11 PROCEDURE — 71045 X-RAY EXAM CHEST 1 VIEW: CPT

## 2020-03-11 PROCEDURE — 94003 VENT MGMT INPAT SUBQ DAY: CPT

## 2020-03-11 PROCEDURE — 94640 AIRWAY INHALATION TREATMENT: CPT

## 2020-03-11 PROCEDURE — 2580000003 HC RX 258: Performed by: PHYSICIAN ASSISTANT

## 2020-03-11 PROCEDURE — 94750 HC PULMONARY COMPLIANCE STUDY: CPT

## 2020-03-11 PROCEDURE — 36600 WITHDRAWAL OF ARTERIAL BLOOD: CPT

## 2020-03-11 PROCEDURE — 2700000000 HC OXYGEN THERAPY PER DAY

## 2020-03-11 PROCEDURE — 80048 BASIC METABOLIC PNL TOTAL CA: CPT

## 2020-03-11 PROCEDURE — 82803 BLOOD GASES ANY COMBINATION: CPT

## 2020-03-11 PROCEDURE — 2000000000 HC ICU R&B

## 2020-03-11 RX ORDER — SODIUM CHLORIDE 9 MG/ML
INJECTION, SOLUTION INTRAVENOUS
Status: COMPLETED
Start: 2020-03-11 | End: 2020-03-12

## 2020-03-11 RX ADMIN — MIDAZOLAM HYDROCHLORIDE 2 MG: 1 INJECTION, SOLUTION INTRAMUSCULAR; INTRAVENOUS at 06:14

## 2020-03-11 RX ADMIN — BUPROPION HYDROCHLORIDE 150 MG: 75 TABLET, FILM COATED ORAL at 08:40

## 2020-03-11 RX ADMIN — Medication 2 PUFF: at 23:00

## 2020-03-11 RX ADMIN — MUPIROCIN: 20 OINTMENT TOPICAL at 20:17

## 2020-03-11 RX ADMIN — PROPOFOL 50 MCG/KG/MIN: 10 INJECTION, EMULSION INTRAVENOUS at 17:35

## 2020-03-11 RX ADMIN — GABAPENTIN 100 MG: 100 CAPSULE ORAL at 15:51

## 2020-03-11 RX ADMIN — VANCOMYCIN HYDROCHLORIDE 1750 MG: 10 INJECTION, POWDER, LYOPHILIZED, FOR SOLUTION INTRAVENOUS at 22:47

## 2020-03-11 RX ADMIN — MUPIROCIN: 20 OINTMENT TOPICAL at 08:40

## 2020-03-11 RX ADMIN — Medication 4 PUFF: at 07:31

## 2020-03-11 RX ADMIN — Medication 4 PUFF: at 16:15

## 2020-03-11 RX ADMIN — FENTANYL CITRATE 50 MCG/HR: 50 INJECTION INTRAVENOUS at 11:24

## 2020-03-11 RX ADMIN — Medication 10 ML: at 08:41

## 2020-03-11 RX ADMIN — GABAPENTIN 100 MG: 100 CAPSULE ORAL at 08:40

## 2020-03-11 RX ADMIN — Medication 2 PUFF: at 19:18

## 2020-03-11 RX ADMIN — GABAPENTIN 100 MG: 100 CAPSULE ORAL at 20:15

## 2020-03-11 RX ADMIN — Medication 2 PUFF: at 16:15

## 2020-03-11 RX ADMIN — Medication 10 ML: at 20:16

## 2020-03-11 RX ADMIN — CHLORHEXIDINE GLUCONATE 0.12% ORAL RINSE 15 ML: 1.2 LIQUID ORAL at 20:16

## 2020-03-11 RX ADMIN — PROPOFOL 50 MCG/KG/MIN: 10 INJECTION, EMULSION INTRAVENOUS at 22:47

## 2020-03-11 RX ADMIN — Medication 2 PUFF: at 11:16

## 2020-03-11 RX ADMIN — Medication 4 PUFF: at 23:00

## 2020-03-11 RX ADMIN — ENOXAPARIN SODIUM 40 MG: 40 INJECTION SUBCUTANEOUS at 18:20

## 2020-03-11 RX ADMIN — BUPROPION HYDROCHLORIDE 150 MG: 75 TABLET, FILM COATED ORAL at 20:16

## 2020-03-11 RX ADMIN — Medication 4 PUFF: at 19:18

## 2020-03-11 RX ADMIN — AZITHROMYCIN DIHYDRATE 500 MG: 500 INJECTION, POWDER, LYOPHILIZED, FOR SOLUTION INTRAVENOUS at 10:40

## 2020-03-11 RX ADMIN — FAMOTIDINE 20 MG: 10 INJECTION INTRAVENOUS at 08:42

## 2020-03-11 RX ADMIN — CHLORHEXIDINE GLUCONATE 0.12% ORAL RINSE 15 ML: 1.2 LIQUID ORAL at 08:40

## 2020-03-11 RX ADMIN — PAROXETINE HYDROCHLORIDE 40 MG: 20 TABLET, FILM COATED ORAL at 18:20

## 2020-03-11 RX ADMIN — Medication 4 PUFF: at 11:16

## 2020-03-11 RX ADMIN — FAMOTIDINE 20 MG: 10 INJECTION INTRAVENOUS at 20:16

## 2020-03-11 RX ADMIN — PROPOFOL 50 MCG/KG/MIN: 10 INJECTION, EMULSION INTRAVENOUS at 20:15

## 2020-03-11 RX ADMIN — PROPOFOL 40 MCG/KG/MIN: 10 INJECTION, EMULSION INTRAVENOUS at 03:02

## 2020-03-11 RX ADMIN — VANCOMYCIN HYDROCHLORIDE 1750 MG: 10 INJECTION, POWDER, LYOPHILIZED, FOR SOLUTION INTRAVENOUS at 12:58

## 2020-03-11 RX ADMIN — PROPOFOL 40 MCG/KG/MIN: 10 INJECTION, EMULSION INTRAVENOUS at 06:15

## 2020-03-11 RX ADMIN — PROPOFOL 45 MCG/KG/MIN: 10 INJECTION, EMULSION INTRAVENOUS at 12:14

## 2020-03-11 RX ADMIN — Medication 4 PUFF: at 03:14

## 2020-03-11 RX ADMIN — Medication 2 PUFF: at 07:32

## 2020-03-11 RX ADMIN — PROPOFOL 45 MCG/KG/MIN: 10 INJECTION, EMULSION INTRAVENOUS at 15:16

## 2020-03-11 RX ADMIN — CEFTRIAXONE 1 G: 1 INJECTION, POWDER, FOR SOLUTION INTRAMUSCULAR; INTRAVENOUS at 09:46

## 2020-03-11 RX ADMIN — Medication 2 PUFF: at 03:14

## 2020-03-11 RX ADMIN — METHYLPREDNISOLONE SODIUM SUCCINATE 40 MG: 40 INJECTION, POWDER, FOR SOLUTION INTRAMUSCULAR; INTRAVENOUS at 08:42

## 2020-03-11 ASSESSMENT — PULMONARY FUNCTION TESTS
PIF_VALUE: 30
PIF_VALUE: 9.2
PIF_VALUE: 31
PIF_VALUE: 29
PIF_VALUE: 32
PIF_VALUE: 27
PIF_VALUE: 30
PIF_VALUE: 27
PIF_VALUE: 8.4
PIF_VALUE: 33
PIF_VALUE: 27
PIF_VALUE: 28
PIF_VALUE: 30
PIF_VALUE: 8
PIF_VALUE: 40
PIF_VALUE: 32
PIF_VALUE: 31
PIF_VALUE: 30
PIF_VALUE: 9.1
PIF_VALUE: 27
PIF_VALUE: 35
PIF_VALUE: 28
PIF_VALUE: 28
PIF_VALUE: 29
PIF_VALUE: 29

## 2020-03-11 ASSESSMENT — PAIN SCALES - GENERAL: PAINLEVEL_OUTOF10: 0

## 2020-03-11 NOTE — PROGRESS NOTES
Pt placed on SBT 5/5, FiO2 50%           03/11/20 0739   Vent Information   Vt Ordered 400 mL   Rate Set 0 bmp   Peak Flow 60 L/min   Pressure Support 0 cmH20   FiO2  50 %   Sensitivity 3   PEEP/CPAP 5   I Time/ I Time % 0 s   Vent Patient Data   High Peep/I Pressure 0   Peak Inspiratory Pressure 8 cmH2O   Mean Airway Pressure 13 cmH20   Rate Measured 23 br/min   Vt Exhaled 462 mL   Minute Volume 9.65 Liters   I:E Ratio 1:3.50   Spontaneous Breathing Trial (SBT) RT Doc   Pulse 74   SpO2 96 %   Additional Respiratory  Assessments   Resp 20   Alarm Settings   High Pressure Alarm 40 cmH2O   Low Minute Volume Alarm 5 L/min   High Respiratory Rate 40 br/min

## 2020-03-11 NOTE — PROGRESS NOTES
Pulmonary & Critical Care Medicine ICU Progress Note    CC: pneumonia    Events of Last 24 hours: agitated overnight    Invasive Lines: IV: RIJ CVC    MV:  3/9  Vent Mode: AC/VC Rate Set: 0 bmp/Vt Ordered: 400 mL/ /FiO2 : 50 %  Recent Labs     03/10/20  0600 03/11/20  0508   PHART 7.289* 7.310*   CRF8JEY 40.5 45.5*   PO2ART 93.1 94.4       IV:   norepinephrine Stopped (03/10/20 0900)    fentaNYL (SUBLIMAZE) infusion Stopped (03/11/20 0746)    propofol Stopped (03/11/20 0746)       Vitals:  /72   Pulse 74   Temp 97.6 °F (36.4 °C) (Axillary)   Resp 20   Ht 5' 5\" (1.651 m)   Wt 286 lb 3.2 oz (129.8 kg)   SpO2 96%   BMI 47.63 kg/m²   on vent  EXAM:  Constitutional: ill appearing. Eyes: PERRL. No sclera icterus. ETT in place  ENT: Normal Nose. Normal Tongue. Neck:  Trachea is midline. No thyroid tenderness. Respiratory: No accessory muscle usage. + wheezes. No rales. No Rhonchi. Cardiovascular: Normal S1S2. [place] Marten No murmur. No digit cyanosis. No digit clubbing. No LE edema. GI: Non-tender. Non-distended. Normal bowel sounds. No masses. No umbilical hernia. Skin: No rash on the exposed extremities. No Nodules on exposed extremities. Neuro: Sedated. Follows commands. Moves all extremities. Psych:  No agitation, no anxiety.     Scheduled Meds:   mupirocin   Nasal BID    chlorhexidine  15 mL Mouth/Throat BID    famotidine (PEPCID) injection  20 mg Intravenous BID    cefTRIAXone (ROCEPHIN) IV  1 g Intravenous Q24H    buPROPion  150 mg Oral BID    vancomycin  1,750 mg Intravenous Q12H    gabapentin  100 mg Oral TID    PARoxetine  40 mg Oral QPM    sodium chloride flush  10 mL Intravenous 2 times per day    enoxaparin  40 mg Subcutaneous Daily    methylPREDNISolone  40 mg Intravenous Q12H    Followed by   Enrrique Seat ON 3/12/2020] predniSONE  40 mg Oral Daily    influenza virus vaccine  0.5 mL Intramuscular Once    nicotine  1 patch Transdermal Daily    azithromycin  500 mg Intravenous Q24H

## 2020-03-11 NOTE — PROGRESS NOTES
Shift assessment complete. Pt is on intubated on ventilator and maintaining O2 saturation above 90%. Pt is restrained to prevent self harm by removing tubing or lines. Pt has propofol and fentanyl running for sedation. Scheduled medications administered at this time. No s/s of immediate distress. Pt not able to express further needs at this time and will require frequent monitoring. Will continue to monitor.  Pt started on TF at this time, rate of 10 ml/hr with flushes 60 ml Q6 hr.

## 2020-03-11 NOTE — PROGRESS NOTES
03/10/20 2304   Vent Information   Vent Type 840   Vent Mode AC/VC   Vt Ordered 400 mL   Rate Set 20 bmp   Peak Flow 60 L/min   Pressure Support 0 cmH20   FiO2  50 %   Sensitivity 3   PEEP/CPAP 5   I Time/ I Time % 0 s   Vent Patient Data   High Peep/I Pressure 0   Peak Inspiratory Pressure 28 cmH2O   Mean Airway Pressure 11 cmH20   Rate Measured 20 br/min   Vt Exhaled 429 mL   Minute Volume 9.24 Liters   I:E Ratio 1:3.20   Cough/Sputum   Sputum How Obtained Suctioned;Endotracheal   Sputum Amount None   Spontaneous Breathing Trial (SBT) RT Doc   Pulse 69   SpO2 94 %   Breath Sounds   Right Upper Lobe Diminished   Right Middle Lobe Diminished   Right Lower Lobe Diminished   Left Upper Lobe Diminished   Left Lower Lobe Diminished   Additional Respiratory  Assessments   Resp 23   Position Semi-Loera's   Alarm Settings   High Pressure Alarm 40 cmH2O   Low Minute Volume Alarm 5 L/min   High Respiratory Rate 40 br/min

## 2020-03-11 NOTE — PROGRESS NOTES
Internal Medicine ICU Progress Note      Events of Last 24 hours:     Patient got more short of breath and was transferred to the ICU. He was initially placed on BiPAP. He continued to be tachypneic. He was then intubated. He became hypotensive and a central line was placed. He was started on Levophed. This morning he is sedated on the ventilator. He is on 1 mcg of Levophed. 3/11-on SBT. Off pressors. Mentation better. Invasive Lines: CVC placed on 3/9/2020         MV: Intubated on 3/9/2020    Recent Labs     03/10/20  0600 20  0508   PHART 7.289* 7.310*   GND1RUC 40.5 45.5*   PO2ART 93.1 94.4       MV Settings:  Vent Mode: AC/VC Rate Set: 0 bmp/Vt Ordered: 400 mL/ /FiO2 : 50 %    IV:   fentaNYL (SUBLIMAZE) infusion Stopped (20)    propofol Stopped (20)       Vitals:  Temp  Av.6 °F (36.4 °C)  Min: 96.6 °F (35.9 °C)  Max: 98.3 °F (36.8 °C)  Pulse  Av.5  Min: 47  Max: 83  BP  Min: 93/56  Max: 154/93  SpO2  Av.9 %  Min: 92 %  Max: 97 %  FiO2   Av %  Min: 50 %  Max: 50 %  Patient Vitals for the past 4 hrs:   BP Temp Temp src Pulse Resp SpO2   20 0900 (!) 149/91 -- -- 83 20 92 %   20 0800 (!) 154/93 -- -- 79 18 95 %   20 0739 -- -- -- 74 20 96 %   20 0700 120/72 96.6 °F (35.9 °C) Axillary 74 23 94 %       CVP:        Intake/Output Summary (Last 24 hours) at 3/11/2020 1005  Last data filed at 3/11/2020 9762  Gross per 24 hour   Intake 2535 ml   Output 2035 ml   Net 500 ml       EXAM:  General: Sick appearing. Intubated. On SBT. Eyes: PERRL. No sclera icterus. No conjunctiva injected. ENT: No discharge. Intubated. Neck: Trachea midline. Normal thyroid. Resp: No accessory muscle use. No crackles. + wheezing. No rhonchi. No dullness on percussion. Diminished breath sounds at the bases  CV: Regular rate. Regular rhythm. No mumur or rub. No edema. No JVD. Palpable pedal pulses. GI: Non-tender. Non-distended. No masses.  No SPECGRAV 1.020   LEUKOCYTESUR Negative   UROBILINOGEN 1.0   BILIRUBINUR Negative   BLOODU TRACE-INTACT*   GLUCOSEU Negative       Cultures:  Pending    Films:  XR CHEST PORTABLE   Final Result   Multifocal airspace disease, minimally improved in the right upper lobe. XR CHEST PORTABLE   Final Result   Right IJ CVC with tip mid SVC. XR CHEST PORTABLE   Final Result   Interval significant increased bilateral airspace disease since earlier   today, likely pulmonary edema. Interval enlargement of the cardiopericardial silhouette may be at least   partially due to low lung volumes and positioning; it also could be related   to congestive heart failure. Correlation for the possibility of pericardial   effusion is recommended. XR CHEST STANDARD (2 VW)   Final Result   New multifocal right greater than left airspace disease consistent with   developing pneumonia and/or edema. No pleural effusion. XR CHEST PORTABLE    (Results Pending)          Assessment:    Principal Problem:    Acute respiratory failure with hypoxia (HCC)  Active Problems:    Depression    Tobacco abuse    OANH (obstructive sleep apnea)    COPD with acute exacerbation (HCC)    PNA (pneumonia)    Pneumonia    Acute respiratory distress    Class 3 severe obesity with body mass index (BMI) of 45.0 to 49.9 in adult [facility])    Pneumonia, primary atypical  Resolved Problems:    * No resolved hospital problems. *         Plan:    Acute hypoxic respiratory failure  -He was admitted to the floor. He got worse. He was answered to the ICU. He was put on BiPAP. He got worse. He was intubated. Spontaneous breathing trial today continue mechanical ventilation another 24 hours    Sepsis  -Patient became hypotensive. A central line was placed. He was started on Levophed. Most likely source of sepsis is pneumonia. This is improved.   His pressure is better.       Community Acquired Pneumonia   - CXR with multifocal airsace disease R >L  - Concerns for gram + organism   - Continue Rocpehin/Azithromycin D#3. He was also started on vancomycin given the severity of his illness.       COPD AE  - likely 2/2 to above   - Continue IBD, IV Steroids, and IV Abx   - Pulmonology consulted  - Resp Cx: ordered      Hyperglycemia no diabetes  - may be 2/2 to steroids   - Hgb A1c normal at 5.8  - Continue to monitor      Depression  - Continue Paxil and Wellbutrin      OANH  -Patient reports he does not use any home CPAP or BiPAP     Tobacco Abuse   - Recommend cessation   - PRN Nicotine patch/gum      Morbid Obesity  - Body mass index is 47.09 kg/m². - Complicating assessment and treatment. Placing patient at risk for multiple co-morbidities as well as early death and contributing to the patient's presentation.   - Counseled on weight loss.     DVT Prophylaxis: Lovenox   Diet:  N.p.o.   Code Status: Full Code        All questions and concerns were addressed to the patient/family. Alternatives to my treatment were discussed. The note was completed using EMR. Every effort was made to ensure accuracy; however, inadvertent computerized transcription errors may be present.          Sally Traore 10:05 AM 3/11/2020

## 2020-03-11 NOTE — PROGRESS NOTES
Vancomycin Day: 2    Patient's labs, cultures, vitals, and vancomycin regimen reviewed. No changes today.   Trough due on 12th at [facility]. Radha 81 D 3/11/067641:54 AM  .

## 2020-03-11 NOTE — PLAN OF CARE
Problem: Falls - Risk of:  Goal: Will remain free from falls  Description: Will remain free from falls  Outcome: Ongoing  Goal: Absence of physical injury  Description: Absence of physical injury  Outcome: Ongoing     Problem: Pain:  Goal: Pain level will decrease  Description: Pain level will decrease  Outcome: Ongoing  Goal: Control of acute pain  Description: Control of acute pain  Outcome: Ongoing  Goal: Control of chronic pain  Description: Control of chronic pain  Outcome: Ongoing     Problem: Restraint Use - Nonviolent/Non-Self-Destructive Behavior:  Goal: Absence of restraint indications  Description: Absence of restraint indications  Outcome: Ongoing  Goal: Absence of restraint-related injury  Description: Absence of restraint-related injury  Outcome: Ongoing     Problem: Nutrition  Goal: Optimal nutrition therapy  3/11/2020 0034 by Crystal Black  Outcome: Ongoing  3/10/2020 1102 by Bee Kilgore RD, PEEWEE  Outcome: Not Met This Shift  Goal: Understanding of nutritional guidelines  3/11/2020 0034 by Crystal Black  Outcome: Ongoing  3/10/2020 1102 by Bee Kilgore RD, PEEWEE  Outcome: Not Met This Shift

## 2020-03-11 NOTE — PROGRESS NOTES
AM assessment completed. See flowsheet. Sedated on vent. Propofol infusing 40 mcg/kg/min and Fentanyl infusing at 50 mcg/hr. RASS -2. Lungs sounds wheezy and diminished on right, clear and diminished in base on left. NSR on bedside monitor. Bowel sounds active. No edema noted. Urinary catheter draining clear tea colored urine. Bilateral soft wrist restraints in place for safety. Labs reviewed. Cont to monitor.

## 2020-03-11 NOTE — PROGRESS NOTES
03/11/20 1918   Vent Information   $Ventilation $Subsequent Day   Skin Assessment Clean, dry, & intact   Vent Type 840   Vent Mode AC/VC   Vt Ordered 400 mL   Rate Set 20 bmp   Peak Flow 60 L/min   Pressure Support 0 cmH20   FiO2  50 %   Sensitivity 2   PEEP/CPAP 5   I Time/ I Time % 0 s   Humidification Source Heated wire   Humidification Temp Measured 31.8   Circuit Condensation Drained   Vent Patient Data   High Peep/I Pressure 0   Peak Inspiratory Pressure 32 cmH2O   Mean Airway Pressure 11 cmH20   Rate Measured 20 br/min   Vt Exhaled 420 mL   Minute Volume 8.41 Liters   I:E Ratio 1:3.20   Plateau Pressure 21 PNH93   Static Compliance 26 mL/cmH2O   Dynamic Compliance 16 mL/cmH2O   Cough/Sputum   Sputum How Obtained Endotracheal   Cough Productive   Sputum Amount Small   Sputum Color Cloudy   Tenacity Thick   Spontaneous Breathing Trial (SBT) RT Doc   Pulse 75   SpO2 96 %   Breath Sounds   Right Upper Lobe Rhonchi   Right Middle Lobe Diminished   Right Lower Lobe Diminished   Left Upper Lobe Rhonchi   Left Lower Lobe Diminished   Additional Respiratory  Assessments   Resp 20   Alarm Settings   High Pressure Alarm 50 cmH2O   Low Minute Volume Alarm 5 L/min   Apnea (secs) 20 secs   High Respiratory Rate 40 br/min   Low Exhaled Vt  300 mL   Patient Observation   Observations 8ett 26 at lip

## 2020-03-12 ENCOUNTER — APPOINTMENT (OUTPATIENT)
Dept: GENERAL RADIOLOGY | Age: 60
DRG: 208 | End: 2020-03-12
Payer: COMMERCIAL

## 2020-03-12 LAB
ANION GAP SERPL CALCULATED.3IONS-SCNC: 8 MMOL/L (ref 3–16)
BASE EXCESS ARTERIAL: 4.2 MMOL/L (ref -3–3)
BASOPHILS ABSOLUTE: 0 K/UL (ref 0–0.2)
BASOPHILS RELATIVE PERCENT: 0.3 %
BUN BLDV-MCNC: 23 MG/DL (ref 7–20)
CALCIUM SERPL-MCNC: 8.4 MG/DL (ref 8.3–10.6)
CARBOXYHEMOGLOBIN ARTERIAL: 0.4 % (ref 0–1.5)
CHLORIDE BLD-SCNC: 97 MMOL/L (ref 99–110)
CO2: 31 MMOL/L (ref 21–32)
CREAT SERPL-MCNC: 0.8 MG/DL (ref 0.9–1.3)
CULTURE, RESPIRATORY: NORMAL
CULTURE, RESPIRATORY: NORMAL
EOSINOPHILS ABSOLUTE: 0 K/UL (ref 0–0.6)
EOSINOPHILS RELATIVE PERCENT: 0.1 %
GFR AFRICAN AMERICAN: >60
GFR NON-AFRICAN AMERICAN: >60
GLUCOSE BLD-MCNC: 119 MG/DL (ref 70–99)
GLUCOSE BLD-MCNC: 149 MG/DL (ref 70–99)
GLUCOSE BLD-MCNC: 154 MG/DL (ref 70–99)
GLUCOSE BLD-MCNC: 154 MG/DL (ref 70–99)
GLUCOSE BLD-MCNC: 95 MG/DL (ref 70–99)
GLUCOSE BLD-MCNC: 97 MG/DL (ref 70–99)
GRAM STAIN RESULT: NORMAL
GRAM STAIN RESULT: NORMAL
HCO3 ARTERIAL: 31 MMOL/L (ref 21–29)
HCT VFR BLD CALC: 42.3 % (ref 40.5–52.5)
HEMOGLOBIN, ART, EXTENDED: 14.5 G/DL (ref 13.5–17.5)
HEMOGLOBIN: 13.8 G/DL (ref 13.5–17.5)
LYMPHOCYTES ABSOLUTE: 1.5 K/UL (ref 1–5.1)
LYMPHOCYTES RELATIVE PERCENT: 11.6 %
MCH RBC QN AUTO: 31.1 PG (ref 26–34)
MCHC RBC AUTO-ENTMCNC: 32.7 G/DL (ref 31–36)
MCV RBC AUTO: 94.9 FL (ref 80–100)
METHEMOGLOBIN ARTERIAL: 0.3 %
MONOCYTES ABSOLUTE: 1.3 K/UL (ref 0–1.3)
MONOCYTES RELATIVE PERCENT: 9.9 %
NEUTROPHILS ABSOLUTE: 10.3 K/UL (ref 1.7–7.7)
NEUTROPHILS RELATIVE PERCENT: 78.1 %
O2 CONTENT ARTERIAL: 20 ML/DL
O2 SAT, ARTERIAL: 96.3 %
O2 THERAPY: ABNORMAL
ORGANISM: ABNORMAL
PCO2 ARTERIAL: 55.3 MMHG (ref 35–45)
PDW BLD-RTO: 14.1 % (ref 12.4–15.4)
PERFORMED ON: ABNORMAL
PERFORMED ON: NORMAL
PH ARTERIAL: 7.37 (ref 7.35–7.45)
PLATELET # BLD: 219 K/UL (ref 135–450)
PMV BLD AUTO: 8.2 FL (ref 5–10.5)
PO2 ARTERIAL: 87.9 MMHG (ref 75–108)
POTASSIUM REFLEX MAGNESIUM: 4.2 MMOL/L (ref 3.5–5.1)
RBC # BLD: 4.46 M/UL (ref 4.2–5.9)
REPORT: NORMAL
RESPIRATORY PANEL PCR: ABNORMAL
RESPIRATORY PANEL PCR: ABNORMAL
SODIUM BLD-SCNC: 136 MMOL/L (ref 136–145)
TCO2 ARTERIAL: 32.7 MMOL/L
VANCOMYCIN TROUGH: 19.5 UG/ML (ref 10–20)
WBC # BLD: 13.2 K/UL (ref 4–11)

## 2020-03-12 PROCEDURE — 99291 CRITICAL CARE FIRST HOUR: CPT | Performed by: INTERNAL MEDICINE

## 2020-03-12 PROCEDURE — 85025 COMPLETE CBC W/AUTO DIFF WBC: CPT

## 2020-03-12 PROCEDURE — 82803 BLOOD GASES ANY COMBINATION: CPT

## 2020-03-12 PROCEDURE — 94003 VENT MGMT INPAT SUBQ DAY: CPT

## 2020-03-12 PROCEDURE — 6360000002 HC RX W HCPCS: Performed by: INTERNAL MEDICINE

## 2020-03-12 PROCEDURE — 94750 HC PULMONARY COMPLIANCE STUDY: CPT

## 2020-03-12 PROCEDURE — 6360000002 HC RX W HCPCS: Performed by: PHYSICIAN ASSISTANT

## 2020-03-12 PROCEDURE — 80048 BASIC METABOLIC PNL TOTAL CA: CPT

## 2020-03-12 PROCEDURE — 2580000003 HC RX 258

## 2020-03-12 PROCEDURE — 2580000003 HC RX 258: Performed by: INTERNAL MEDICINE

## 2020-03-12 PROCEDURE — 6370000000 HC RX 637 (ALT 250 FOR IP): Performed by: PHYSICIAN ASSISTANT

## 2020-03-12 PROCEDURE — 80202 ASSAY OF VANCOMYCIN: CPT

## 2020-03-12 PROCEDURE — 99232 SBSQ HOSP IP/OBS MODERATE 35: CPT | Performed by: INTERNAL MEDICINE

## 2020-03-12 PROCEDURE — 6370000000 HC RX 637 (ALT 250 FOR IP): Performed by: INTERNAL MEDICINE

## 2020-03-12 PROCEDURE — 2000000000 HC ICU R&B

## 2020-03-12 PROCEDURE — 2580000003 HC RX 258: Performed by: PHYSICIAN ASSISTANT

## 2020-03-12 PROCEDURE — 2700000000 HC OXYGEN THERAPY PER DAY

## 2020-03-12 PROCEDURE — 94761 N-INVAS EAR/PLS OXIMETRY MLT: CPT

## 2020-03-12 PROCEDURE — 2500000003 HC RX 250 WO HCPCS: Performed by: INTERNAL MEDICINE

## 2020-03-12 PROCEDURE — 71045 X-RAY EXAM CHEST 1 VIEW: CPT

## 2020-03-12 PROCEDURE — 94640 AIRWAY INHALATION TREATMENT: CPT

## 2020-03-12 PROCEDURE — 36600 WITHDRAWAL OF ARTERIAL BLOOD: CPT

## 2020-03-12 RX ORDER — METHYLPREDNISOLONE SODIUM SUCCINATE 40 MG/ML
40 INJECTION, POWDER, LYOPHILIZED, FOR SOLUTION INTRAMUSCULAR; INTRAVENOUS EVERY 24 HOURS
Status: DISCONTINUED | OUTPATIENT
Start: 2020-03-12 | End: 2020-03-15

## 2020-03-12 RX ORDER — PROPOFOL 10 MG/ML
10 INJECTION, EMULSION INTRAVENOUS
Status: DISCONTINUED | OUTPATIENT
Start: 2020-03-12 | End: 2020-03-14

## 2020-03-12 RX ORDER — OSELTAMIVIR PHOSPHATE 6 MG/ML
75 FOR SUSPENSION ORAL 2 TIMES DAILY
Status: DISCONTINUED | OUTPATIENT
Start: 2020-03-12 | End: 2020-03-13 | Stop reason: SDUPTHER

## 2020-03-12 RX ADMIN — AZITHROMYCIN DIHYDRATE 500 MG: 500 INJECTION, POWDER, LYOPHILIZED, FOR SOLUTION INTRAVENOUS at 11:54

## 2020-03-12 RX ADMIN — VANCOMYCIN HYDROCHLORIDE 1750 MG: 10 INJECTION, POWDER, LYOPHILIZED, FOR SOLUTION INTRAVENOUS at 17:29

## 2020-03-12 RX ADMIN — MUPIROCIN: 20 OINTMENT TOPICAL at 08:17

## 2020-03-12 RX ADMIN — MUPIROCIN: 20 OINTMENT TOPICAL at 21:10

## 2020-03-12 RX ADMIN — PROPOFOL 50 MCG/KG/MIN: 10 INJECTION, EMULSION INTRAVENOUS at 06:53

## 2020-03-12 RX ADMIN — Medication 2 PUFF: at 07:26

## 2020-03-12 RX ADMIN — CHLORHEXIDINE GLUCONATE 0.12% ORAL RINSE 15 ML: 1.2 LIQUID ORAL at 21:10

## 2020-03-12 RX ADMIN — Medication 4 PUFF: at 07:26

## 2020-03-12 RX ADMIN — Medication 10 ML: at 21:09

## 2020-03-12 RX ADMIN — Medication 4 PUFF: at 15:37

## 2020-03-12 RX ADMIN — Medication 4 PUFF: at 19:49

## 2020-03-12 RX ADMIN — Medication 2 PUFF: at 15:37

## 2020-03-12 RX ADMIN — Medication 10 ML: at 08:16

## 2020-03-12 RX ADMIN — Medication 2 PUFF: at 03:09

## 2020-03-12 RX ADMIN — PROPOFOL 50 MCG/KG/MIN: 10 INJECTION, EMULSION INTRAVENOUS at 01:34

## 2020-03-12 RX ADMIN — BUPROPION HYDROCHLORIDE 150 MG: 75 TABLET, FILM COATED ORAL at 21:09

## 2020-03-12 RX ADMIN — Medication 2 PUFF: at 12:08

## 2020-03-12 RX ADMIN — ENOXAPARIN SODIUM 40 MG: 40 INJECTION SUBCUTANEOUS at 17:29

## 2020-03-12 RX ADMIN — PROPOFOL 50 MCG/KG/MIN: 10 INJECTION, EMULSION INTRAVENOUS at 17:05

## 2020-03-12 RX ADMIN — BUPROPION HYDROCHLORIDE 150 MG: 75 TABLET, FILM COATED ORAL at 08:16

## 2020-03-12 RX ADMIN — PAROXETINE HYDROCHLORIDE 40 MG: 20 TABLET, FILM COATED ORAL at 17:29

## 2020-03-12 RX ADMIN — Medication 75 MG: at 22:14

## 2020-03-12 RX ADMIN — PROPOFOL 50 MCG/KG/MIN: 10 INJECTION, EMULSION INTRAVENOUS at 21:22

## 2020-03-12 RX ADMIN — SODIUM CHLORIDE 250 ML: 9 INJECTION, SOLUTION INTRAVENOUS at 00:29

## 2020-03-12 RX ADMIN — CHLORHEXIDINE GLUCONATE 0.12% ORAL RINSE 15 ML: 1.2 LIQUID ORAL at 08:16

## 2020-03-12 RX ADMIN — FAMOTIDINE 20 MG: 10 INJECTION INTRAVENOUS at 08:16

## 2020-03-12 RX ADMIN — FENTANYL CITRATE 50 MCG/HR: 50 INJECTION INTRAVENOUS at 01:33

## 2020-03-12 RX ADMIN — Medication 4 PUFF: at 12:08

## 2020-03-12 RX ADMIN — GABAPENTIN 100 MG: 100 CAPSULE ORAL at 13:54

## 2020-03-12 RX ADMIN — FAMOTIDINE 20 MG: 10 INJECTION INTRAVENOUS at 21:09

## 2020-03-12 RX ADMIN — PREDNISONE 40 MG: 20 TABLET ORAL at 08:16

## 2020-03-12 RX ADMIN — GABAPENTIN 100 MG: 100 CAPSULE ORAL at 08:15

## 2020-03-12 RX ADMIN — GABAPENTIN 100 MG: 100 CAPSULE ORAL at 21:09

## 2020-03-12 RX ADMIN — Medication 4 PUFF: at 03:09

## 2020-03-12 RX ADMIN — PROPOFOL 50 MCG/KG/MIN: 10 INJECTION, EMULSION INTRAVENOUS at 19:19

## 2020-03-12 RX ADMIN — PROPOFOL 10 MCG/KG/MIN: 10 INJECTION, EMULSION INTRAVENOUS at 09:51

## 2020-03-12 RX ADMIN — PROPOFOL 50 MCG/KG/MIN: 10 INJECTION, EMULSION INTRAVENOUS at 11:54

## 2020-03-12 RX ADMIN — Medication 2 PUFF: at 23:58

## 2020-03-12 RX ADMIN — Medication 4 PUFF: at 23:58

## 2020-03-12 RX ADMIN — PROPOFOL 50 MCG/KG/MIN: 10 INJECTION, EMULSION INTRAVENOUS at 14:24

## 2020-03-12 RX ADMIN — CEFTRIAXONE 1 G: 1 INJECTION, POWDER, FOR SOLUTION INTRAMUSCULAR; INTRAVENOUS at 09:52

## 2020-03-12 RX ADMIN — Medication 2 PUFF: at 19:49

## 2020-03-12 RX ADMIN — PROPOFOL 50 MCG/KG/MIN: 10 INJECTION, EMULSION INTRAVENOUS at 05:02

## 2020-03-12 RX ADMIN — Medication 75 MG: at 11:20

## 2020-03-12 RX ADMIN — METHYLPREDNISOLONE SODIUM SUCCINATE 40 MG: 40 INJECTION, POWDER, FOR SOLUTION INTRAMUSCULAR; INTRAVENOUS at 09:52

## 2020-03-12 ASSESSMENT — PULMONARY FUNCTION TESTS
PIF_VALUE: 26
PIF_VALUE: 25
PIF_VALUE: 26
PIF_VALUE: 27
PIF_VALUE: 29
PIF_VALUE: 25
PIF_VALUE: 31
PIF_VALUE: 30
PIF_VALUE: 36
PIF_VALUE: 13
PIF_VALUE: 26
PIF_VALUE: 29
PIF_VALUE: 29
PIF_VALUE: 27
PIF_VALUE: 24
PIF_VALUE: 31
PIF_VALUE: 23
PIF_VALUE: 13
PIF_VALUE: 25
PIF_VALUE: 14
PIF_VALUE: 24
PIF_VALUE: 27
PIF_VALUE: 25
PIF_VALUE: 29
PIF_VALUE: 30
PIF_VALUE: 28
PIF_VALUE: 27
PIF_VALUE: 26
PIF_VALUE: 26
PIF_VALUE: 27
PIF_VALUE: 28

## 2020-03-12 ASSESSMENT — PAIN SCALES - GENERAL: PAINLEVEL_OUTOF10: 0

## 2020-03-12 NOTE — PROGRESS NOTES
Internal Medicine ICU Progress Note      Events of Last 24 hours:     Patient got more short of breath and was transferred to the ICU. He was initially placed on BiPAP. He continued to be tachypneic. He was then intubated. He became hypotensive and a central line was placed. He was started on Levophed. This morning he is sedated on the ventilator. He is on 1 mcg of Levophed. 3/11-on SBT. Off pressors. Mentation better. 3/12- on SBT. Awake and alert and following commands. Invasive Lines: CVC placed on 3/9/2020         MV: Intubated on 3/9/2020    Recent Labs     20  1010 20  0520   PHART 7.342* 7.367   [id number] 49.7* 55.3*   PO2ART 74.8* 87.9       MV Settings:  Vent Mode: AC/VC Rate Set: 0 bmp/Vt Ordered: 400 mL/ /FiO2 : 50 %    IV:   propofol      fentaNYL (SUBLIMAZE) infusion Stopped (20 0831)       Vitals:  Temp  Av.5 °F (36.9 °C)  Min: 98 °F (36.7 °C)  Max: 98.9 °F (37.2 °C)  Pulse  Av.4  Min: 70  Max: 85  BP  Min: 112/72  Max: 150/89  SpO2  Av.3 %  Min: 92 %  Max: 98 %  FiO2   Av %  Min: 50 %  Max: 50 %  Patient Vitals for the past 4 hrs:   BP Temp Temp src Pulse Resp SpO2   20 0900 (!) 150/89 -- -- 85 18 92 %   20 0800 (!) 149/81 -- -- 85 21 94 %   20 0700 130/77 98.9 °F (37.2 °C) Axillary 83 20 94 %   20 0600 125/68 -- -- 79 20 94 %       CVP:        Intake/Output Summary (Last 24 hours) at 3/12/2020 0946  Last data filed at 3/12/2020 0831  Gross per 24 hour   Intake 2833 ml   Output 2860 ml   Net -27 ml       EXAM:  General: AWake and alert. Appears improved. .  Intubated. On SBT. Eyes: PERRL. No sclera icterus. No conjunctiva injected. ENT: No discharge. Intubated. Neck: Trachea midline. Normal thyroid. Resp: No accessory muscle use. No crackles. + wheezing. No rhonchi. No dullness on percussion. Diminished breath sounds at the bases  CV: Regular rate. Regular rhythm. No mumur or rub. No edema. No JVD.  Palpable

## 2020-03-12 NOTE — PROGRESS NOTES
Pt switched by RT from SBT to assist control on Vent settings with 20 / 400 / 50% / 5. Pt becoming restless with frequent movement in bed RASS of +2. Propofol drip started back at 10 mcg & Fentanyl at 25 mcg. Wife at the bedside and updated on care and plan. Call light within reach. Bed alarm is on. Will monitor.

## 2020-03-12 NOTE — PROGRESS NOTES
03/12/20 1211   Vent Information   Vent Type 840   Vent Mode AC/VC   Vt Ordered 400 mL   Rate Set 20 bmp   Peak Flow 60 L/min   Pressure Support 0 cmH20   FiO2  50 %   Sensitivity 2   PEEP/CPAP 5   I Time/ I Time % 0 s   Humidification Temp Measured 36.8   Vent Patient Data   High Peep/I Pressure 0   Peak Inspiratory Pressure 31 cmH2O   Mean Airway Pressure 11 cmH20   Rate Measured 20 br/min   Vt Exhaled 445 mL   Minute Volume 8.93 Liters   I:E Ratio 1:3.20   Cough/Sputum   Sputum How Obtained Endotracheal   Cough Non-productive   Spontaneous Breathing Trial (SBT) RT Doc   Pulse 87   SpO2 94 %   Breath Sounds   Right Upper Lobe Expiratory Wheezes   Right Middle Lobe Expiratory Wheezes   Right Lower Lobe Expiratory Wheezes   Left Upper Lobe Expiratory Wheezes   Left Lower Lobe Expiratory Wheezes   Additional Respiratory  Assessments   Resp 20   Position Semi-Loera's   Alarm Settings   High Pressure Alarm 40 cmH2O   Low Minute Volume Alarm 5 L/min   Apnea (secs) 20 secs   High Respiratory Rate 40 br/min   Patient Observation   Observations ambu bs, h20 ok   ETT (adult)   Placement Date: 03/09/20   Tube Size: 8 mm   Secured at 27 cm   Measured From Lips   ET Placement Left   Secured By Commercial tube castro   Site Condition Dry

## 2020-03-12 NOTE — PROGRESS NOTES
Dr. Josi Obando at the bedside to examine pt. See progress note for details. Reviewed POC with micro PCR results of influenza A equivical, will still treat for influenza. Reviewed with wife Keshav Riding at bedside. Pt currently on SBT with VSS.

## 2020-03-12 NOTE — PROGRESS NOTES
35 ml of Fentanyl wasted with Katiana Obrien RN.     Electronically signed by Janell Mota RN on 3/12/2020 at 1:37 AM

## 2020-03-12 NOTE — PROGRESS NOTES
SBT 5/5 started at this time. Flipped by RT. TF stopped. Propofol drip stopped that was at 50 mcg. Fentanyl drip remains running at 50 mcg.

## 2020-03-12 NOTE — PROGRESS NOTES
Pt becoming restless and uncomfortable in bed. RASS +2. Increased Propofol drip from 40 mcg to 50 mcg. Increased Fentanyl drip from 25 mcg to 50 mcg continuous. Pt repositioned, turned, and pillow support provided. Pt's wife at bedside stated he has had several lumbar procedures with chronic back pain. Continue bilateral wrist restraints for safety. Call light within reach. Bed alarm is on. Will monitor.

## 2020-03-12 NOTE — PLAN OF CARE
Problem: Falls - Risk of:  Goal: Will remain free from falls  Description: Will remain free from falls  Outcome: Ongoing     Problem: Restraint Use - Nonviolent/Non-Self-Destructive Behavior:  Goal: Absence of restraint indications  Description: Absence of restraint indications  Outcome: Ongoing  Goal: Absence of restraint-related injury  Description: Absence of restraint-related injury  Outcome: Ongoing     Problem: Nutrition  Goal: Optimal nutrition therapy  Outcome: Ongoing     Problem: Airway Clearance - Ineffective:  Goal: Clear lung sounds  Description: Clear lung sounds  Outcome: Ongoing     Problem: Gas Exchange - Impaired:  Goal: Levels of oxygenation will improve  Description: Levels of oxygenation will improve  Outcome: Ongoing     Problem: Respiratory:  Goal: Respiratory status will improve  Description: Respiratory status will improve  Outcome: Ongoing

## 2020-03-12 NOTE — PROGRESS NOTES
Reassessment completed:     Pt remains sedated on Ventilator. RASS -1. Pt will awake to touch/voice. Respirations are unlabored and even on Ventilator. Settings are at 21 / 400 / 50% / 5. ETT is 8 mm at 25 LL. Respirations are expiratory wheezing to RUL with diminished throughout all other lobes. Right IJ in place with CDI dressing, propofol running at 50 mcg, fentanyl running at 50 mcg. OG in place at 58 cm, TF running at 10 ml/hr. FSBS was 154. Ribeiro cath in place with kasey/clear urine with STAT lock. Bilateral wrist restraints for safety. Call light within reach. Bed alarm is on. Will monitor.       03/12/20 1600   Vitals   Pulse 80   Resp 20   /71   MAP (mmHg) 84

## 2020-03-12 NOTE — PLAN OF CARE
Problem: Falls - Risk of:  Goal: Will remain free from falls  Description: Will remain free from falls  Outcome: Ongoing  Goal: Absence of physical injury  Description: Absence of physical injury  Outcome: Ongoing     Problem: Pain:  Goal: Pain level will decrease  Description: Pain level will decrease  Outcome: Ongoing  Goal: Control of acute pain  Description: Control of acute pain  Outcome: Ongoing  Goal: Control of chronic pain  Description: Control of chronic pain  Outcome: Ongoing     Problem: Restraint Use - Nonviolent/Non-Self-Destructive Behavior:  Goal: Absence of restraint indications  Description: Absence of restraint indications  Outcome: Ongoing  Goal: Absence of restraint-related injury  Description: Absence of restraint-related injury  Outcome: Ongoing     Problem: Nutrition  Goal: Optimal nutrition therapy  Outcome: Ongoing  Goal: Understanding of nutritional guidelines  Outcome: Ongoing

## 2020-03-12 NOTE — PROGRESS NOTES
Meds:  midazolam, fentanNYL, sodium chloride flush, acetaminophen **OR** acetaminophen, polyethylene glycol, promethazine **OR** ondansetron, albuterol    Results:  CBC:   Recent Labs     03/10/20  1015 03/11/20  0501 03/12/20  0500   WBC 16.4* 16.0* 13.2*   HGB 13.5 13.4* 13.8   HCT 41.9 40.9 42.3   MCV 93.5 95.0 94.9    215 219     BMP:   Recent Labs     03/10/20  0900 03/11/20  0501 03/12/20  0500    138 136   K 4.7 5.1 4.2    102 97*   CO2 27 28 31   PHOS 4.6  --   --    BUN 25* 24* 23*   CREATININE 1.1 0.9 0.8*     Rapid flu neg  BAL 3/10 pending  PCR + adenovirus     Chest imaging was reviewed by me and showed CXR 3/12  Impression   Mild improvement in norma asd        ASSESSMENT:  · Acute hypoxic respiratory failure  · Respiratory infection with Adenovirus  · Infuenza A test \"equivocal\"  · Pneumonia, risk for community aquired MRSA  · COPD  · OANH - not treated  · Morbid obesity  · Tobacco abuse     PLAN:  · Mechanical ventilation as per my orders. The ventilator was adjusted by me at the bedside for unstable, life threatening respiratory failure. · IV Propofol for sedation, target RASS -2, with daily spontaneous awakening trial.  Fentanyl PRN pain, gtt as needed  · Head of bed 30 degrees or higher at all times  · Daily spontaneous breathing trial once PEEP less than 8, FiO2 less than 55%. · Vancomycin, Rocephin and Azithromycin  · Start Tamiflu  · Solumedrol 40mg to daily  · TF  · SSI  · Pepcid, Lovenox SQ  · Due to at least single organ failure and risk of rapid deterioration, I spent 35 minutes of Critical care time reviewing labs/films, examining patient, collaborating with other physicians. This does not include time performing critical procedures.

## 2020-03-12 NOTE — PROGRESS NOTES
Care rounds completed with Dr. Karthik Gong and multidisciplinary team. Reviewed labs, meds, VS, assessment, & plan of care for today. See dictated note and new orders for details. Reviewed POC for patient to complete SBT and continue assist control settings on Ventilator for 1 more day due to wheezing. Continuous sedation with Propofol & Fentanyl. Starting Tamiflu.

## 2020-03-12 NOTE — PROGRESS NOTES
Micro called with respiratory panel results and patient is positive for the adenovirus and influenza A. Dr. Tayo Álvarez perfectserved and patient placed in contact/droplet isolation per protocol.

## 2020-03-13 ENCOUNTER — APPOINTMENT (OUTPATIENT)
Dept: GENERAL RADIOLOGY | Age: 60
DRG: 208 | End: 2020-03-13
Payer: COMMERCIAL

## 2020-03-13 LAB
ANION GAP SERPL CALCULATED.3IONS-SCNC: 10 MMOL/L (ref 3–16)
BASE EXCESS ARTERIAL: 4.6 MMOL/L (ref -3–3)
BASE EXCESS ARTERIAL: 6.2 MMOL/L (ref -3–3)
BASOPHILS ABSOLUTE: 0 K/UL (ref 0–0.2)
BASOPHILS RELATIVE PERCENT: 0.3 %
BUN BLDV-MCNC: 22 MG/DL (ref 7–20)
CALCIUM SERPL-MCNC: 8.7 MG/DL (ref 8.3–10.6)
CARBOXYHEMOGLOBIN ARTERIAL: 0.4 % (ref 0–1.5)
CARBOXYHEMOGLOBIN ARTERIAL: 0.4 % (ref 0–1.5)
CHLORIDE BLD-SCNC: 98 MMOL/L (ref 99–110)
CO2: 32 MMOL/L (ref 21–32)
CREAT SERPL-MCNC: 0.8 MG/DL (ref 0.9–1.3)
EOSINOPHILS ABSOLUTE: 0 K/UL (ref 0–0.6)
EOSINOPHILS RELATIVE PERCENT: 0.1 %
GFR AFRICAN AMERICAN: >60
GFR NON-AFRICAN AMERICAN: >60
GLUCOSE BLD-MCNC: 102 MG/DL (ref 70–99)
GLUCOSE BLD-MCNC: 102 MG/DL (ref 70–99)
GLUCOSE BLD-MCNC: 104 MG/DL (ref 70–99)
GLUCOSE BLD-MCNC: 104 MG/DL (ref 70–99)
GLUCOSE BLD-MCNC: 122 MG/DL (ref 70–99)
GLUCOSE BLD-MCNC: 98 MG/DL (ref 70–99)
HCO3 ARTERIAL: 31.9 MMOL/L (ref 21–29)
HCO3 ARTERIAL: 32.4 MMOL/L (ref 21–29)
HCT VFR BLD CALC: 43 % (ref 40.5–52.5)
HEMOGLOBIN, ART, EXTENDED: 14.7 G/DL (ref 13.5–17.5)
HEMOGLOBIN, ART, EXTENDED: 15.3 G/DL (ref 13.5–17.5)
HEMOGLOBIN: 14 G/DL (ref 13.5–17.5)
LYMPHOCYTES ABSOLUTE: 1.8 K/UL (ref 1–5.1)
LYMPHOCYTES RELATIVE PERCENT: 14.1 %
MCH RBC QN AUTO: 30.6 PG (ref 26–34)
MCHC RBC AUTO-ENTMCNC: 32.5 G/DL (ref 31–36)
MCV RBC AUTO: 94 FL (ref 80–100)
METHEMOGLOBIN ARTERIAL: 0.3 %
METHEMOGLOBIN ARTERIAL: 0.3 %
MONOCYTES ABSOLUTE: 1.2 K/UL (ref 0–1.3)
MONOCYTES RELATIVE PERCENT: 9.6 %
NEUTROPHILS ABSOLUTE: 9.8 K/UL (ref 1.7–7.7)
NEUTROPHILS RELATIVE PERCENT: 75.9 %
O2 CONTENT ARTERIAL: 20 ML/DL
O2 CONTENT ARTERIAL: 20 ML/DL
O2 SAT, ARTERIAL: 94.1 %
O2 SAT, ARTERIAL: 96.3 %
O2 THERAPY: ABNORMAL
O2 THERAPY: ABNORMAL
PCO2 ARTERIAL: 52.6 MMHG (ref 35–45)
PCO2 ARTERIAL: 58.3 MMHG (ref 35–45)
PDW BLD-RTO: 14.2 % (ref 12.4–15.4)
PERFORMED ON: ABNORMAL
PERFORMED ON: NORMAL
PH ARTERIAL: 7.36 (ref 7.35–7.45)
PH ARTERIAL: 7.41 (ref 7.35–7.45)
PLATELET # BLD: 245 K/UL (ref 135–450)
PMV BLD AUTO: 8.2 FL (ref 5–10.5)
PO2 ARTERIAL: 70.5 MMHG (ref 75–108)
PO2 ARTERIAL: 89.7 MMHG (ref 75–108)
POTASSIUM REFLEX MAGNESIUM: 4.2 MMOL/L (ref 3.5–5.1)
RBC # BLD: 4.57 M/UL (ref 4.2–5.9)
SODIUM BLD-SCNC: 140 MMOL/L (ref 136–145)
TCO2 ARTERIAL: 33.7 MMOL/L
TCO2 ARTERIAL: 34.1 MMOL/L
WBC # BLD: 12.9 K/UL (ref 4–11)

## 2020-03-13 PROCEDURE — 6360000002 HC RX W HCPCS: Performed by: INTERNAL MEDICINE

## 2020-03-13 PROCEDURE — 6370000000 HC RX 637 (ALT 250 FOR IP): Performed by: PHYSICIAN ASSISTANT

## 2020-03-13 PROCEDURE — 2580000003 HC RX 258: Performed by: INTERNAL MEDICINE

## 2020-03-13 PROCEDURE — 80048 BASIC METABOLIC PNL TOTAL CA: CPT

## 2020-03-13 PROCEDURE — 6360000002 HC RX W HCPCS: Performed by: PHYSICIAN ASSISTANT

## 2020-03-13 PROCEDURE — 82803 BLOOD GASES ANY COMBINATION: CPT

## 2020-03-13 PROCEDURE — 85025 COMPLETE CBC W/AUTO DIFF WBC: CPT

## 2020-03-13 PROCEDURE — 36600 WITHDRAWAL OF ARTERIAL BLOOD: CPT

## 2020-03-13 PROCEDURE — 99232 SBSQ HOSP IP/OBS MODERATE 35: CPT | Performed by: INTERNAL MEDICINE

## 2020-03-13 PROCEDURE — 2700000000 HC OXYGEN THERAPY PER DAY

## 2020-03-13 PROCEDURE — 71045 X-RAY EXAM CHEST 1 VIEW: CPT

## 2020-03-13 PROCEDURE — 94761 N-INVAS EAR/PLS OXIMETRY MLT: CPT

## 2020-03-13 PROCEDURE — 94750 HC PULMONARY COMPLIANCE STUDY: CPT

## 2020-03-13 PROCEDURE — 2000000000 HC ICU R&B

## 2020-03-13 PROCEDURE — 6370000000 HC RX 637 (ALT 250 FOR IP): Performed by: INTERNAL MEDICINE

## 2020-03-13 PROCEDURE — 2580000003 HC RX 258: Performed by: PHYSICIAN ASSISTANT

## 2020-03-13 PROCEDURE — 74018 RADEX ABDOMEN 1 VIEW: CPT

## 2020-03-13 PROCEDURE — 2500000003 HC RX 250 WO HCPCS: Performed by: INTERNAL MEDICINE

## 2020-03-13 PROCEDURE — 99291 CRITICAL CARE FIRST HOUR: CPT | Performed by: INTERNAL MEDICINE

## 2020-03-13 PROCEDURE — 94640 AIRWAY INHALATION TREATMENT: CPT

## 2020-03-13 RX ORDER — IPRATROPIUM BROMIDE AND ALBUTEROL SULFATE 2.5; .5 MG/3ML; MG/3ML
1 SOLUTION RESPIRATORY (INHALATION) EVERY 4 HOURS
Status: DISCONTINUED | OUTPATIENT
Start: 2020-03-13 | End: 2020-03-16

## 2020-03-13 RX ORDER — OSELTAMIVIR PHOSPHATE 75 MG/1
75 CAPSULE ORAL 2 TIMES DAILY
Status: DISCONTINUED | OUTPATIENT
Start: 2020-03-13 | End: 2020-03-14

## 2020-03-13 RX ADMIN — METHYLPREDNISOLONE SODIUM SUCCINATE 40 MG: 40 INJECTION, POWDER, FOR SOLUTION INTRAMUSCULAR; INTRAVENOUS at 10:17

## 2020-03-13 RX ADMIN — VANCOMYCIN HYDROCHLORIDE 1750 MG: 10 INJECTION, POWDER, LYOPHILIZED, FOR SOLUTION INTRAVENOUS at 13:38

## 2020-03-13 RX ADMIN — Medication 10 ML: at 20:41

## 2020-03-13 RX ADMIN — IPRATROPIUM BROMIDE AND ALBUTEROL SULFATE 1 AMPULE: 2.5; .5 SOLUTION RESPIRATORY (INHALATION) at 15:05

## 2020-03-13 RX ADMIN — CHLORHEXIDINE GLUCONATE 0.12% ORAL RINSE 15 ML: 1.2 LIQUID ORAL at 10:16

## 2020-03-13 RX ADMIN — MUPIROCIN: 20 OINTMENT TOPICAL at 10:20

## 2020-03-13 RX ADMIN — CHLORHEXIDINE GLUCONATE 0.12% ORAL RINSE 15 ML: 1.2 LIQUID ORAL at 20:40

## 2020-03-13 RX ADMIN — AZITHROMYCIN DIHYDRATE 500 MG: 500 INJECTION, POWDER, LYOPHILIZED, FOR SOLUTION INTRAVENOUS at 11:55

## 2020-03-13 RX ADMIN — Medication 4 PUFF: at 07:13

## 2020-03-13 RX ADMIN — ACETAMINOPHEN 650 MG: 325 TABLET ORAL at 20:46

## 2020-03-13 RX ADMIN — GABAPENTIN 100 MG: 100 CAPSULE ORAL at 20:40

## 2020-03-13 RX ADMIN — GABAPENTIN 100 MG: 100 CAPSULE ORAL at 10:17

## 2020-03-13 RX ADMIN — Medication 2 PUFF: at 07:13

## 2020-03-13 RX ADMIN — PROPOFOL 50 MCG/KG/MIN: 10 INJECTION, EMULSION INTRAVENOUS at 00:27

## 2020-03-13 RX ADMIN — MUPIROCIN: 20 OINTMENT TOPICAL at 20:41

## 2020-03-13 RX ADMIN — CEFTRIAXONE 1 G: 1 INJECTION, POWDER, FOR SOLUTION INTRAMUSCULAR; INTRAVENOUS at 10:15

## 2020-03-13 RX ADMIN — BUPROPION HYDROCHLORIDE 150 MG: 75 TABLET, FILM COATED ORAL at 20:40

## 2020-03-13 RX ADMIN — Medication 4 PUFF: at 03:58

## 2020-03-13 RX ADMIN — OSELTAMIVIR PHOSPHATE 75 MG: 75 CAPSULE ORAL at 20:40

## 2020-03-13 RX ADMIN — OSELTAMIVIR PHOSPHATE 75 MG: 75 CAPSULE ORAL at 11:54

## 2020-03-13 RX ADMIN — FAMOTIDINE 20 MG: 10 INJECTION INTRAVENOUS at 11:54

## 2020-03-13 RX ADMIN — PAROXETINE HYDROCHLORIDE 40 MG: 20 TABLET, FILM COATED ORAL at 18:00

## 2020-03-13 RX ADMIN — PROPOFOL 50 MCG/KG/MIN: 10 INJECTION, EMULSION INTRAVENOUS at 05:16

## 2020-03-13 RX ADMIN — IPRATROPIUM BROMIDE AND ALBUTEROL SULFATE 1 AMPULE: 2.5; .5 SOLUTION RESPIRATORY (INHALATION) at 20:05

## 2020-03-13 RX ADMIN — Medication 4 PUFF: at 11:20

## 2020-03-13 RX ADMIN — Medication 10 ML: at 10:17

## 2020-03-13 RX ADMIN — Medication 2 PUFF: at 11:19

## 2020-03-13 RX ADMIN — ONDANSETRON HYDROCHLORIDE 4 MG: 2 INJECTION, SOLUTION INTRAMUSCULAR; INTRAVENOUS at 16:11

## 2020-03-13 RX ADMIN — PROPOFOL 50 MCG/KG/MIN: 10 INJECTION, EMULSION INTRAVENOUS at 02:22

## 2020-03-13 RX ADMIN — IPRATROPIUM BROMIDE AND ALBUTEROL SULFATE 1 AMPULE: 2.5; .5 SOLUTION RESPIRATORY (INHALATION) at 23:09

## 2020-03-13 RX ADMIN — BUPROPION HYDROCHLORIDE 150 MG: 75 TABLET, FILM COATED ORAL at 10:16

## 2020-03-13 RX ADMIN — ENOXAPARIN SODIUM 40 MG: 40 INJECTION SUBCUTANEOUS at 18:00

## 2020-03-13 RX ADMIN — FENTANYL CITRATE 50 MCG/HR: 50 INJECTION INTRAVENOUS at 00:27

## 2020-03-13 RX ADMIN — FAMOTIDINE 20 MG: 10 INJECTION INTRAVENOUS at 20:41

## 2020-03-13 RX ADMIN — Medication 2 PUFF: at 03:57

## 2020-03-13 ASSESSMENT — PAIN SCALES - GENERAL
PAINLEVEL_OUTOF10: 6
PAINLEVEL_OUTOF10: 0

## 2020-03-13 ASSESSMENT — PULMONARY FUNCTION TESTS
PIF_VALUE: 26
PIF_VALUE: 27
PIF_VALUE: 26
PIF_VALUE: 28
PIF_VALUE: 26
PIF_VALUE: 27
PIF_VALUE: 13
PIF_VALUE: 13
PIF_VALUE: 26
PIF_VALUE: 12
PIF_VALUE: 27
PIF_VALUE: 11
PIF_VALUE: 25
PIF_VALUE: 25
PIF_VALUE: 13
PIF_VALUE: 26

## 2020-03-13 NOTE — PROGRESS NOTES
RESPIRATORY THERAPY ASSESSMENT    Name:  Darrel Vega Baja  Medical Record Number:  [id number]  Age: 64 y.o. Gender: male  : 1960  Today's Date:  3/13/2020  Room:  3010/3010-01    Assessment     Is the patient being admitted for a COPD or Asthma exacerbation? No   (If yes the patient will be seen every 4 hours for the first 24 hours and then reassessed)    Patient Admission Diagnosis      Allergies  Allergies   Allergen Reactions    Codeine Itching    Dilaudid [Hydromorphone Hcl]        Minimum Predicted Vital Capacity:               Actual Vital Capacity:                    Pulmonary History:COPD  Home Oxygen Therapy:  room air  Home Respiratory Therapy:Albuterol tid   Current Respiratory Therapy:  duoneb q4  Treatment Type: MDI  Medications: Albuterol    Respiratory Severity Index(RSI)   Patients with orders for inhalation medications, oxygen, or any therapeutic treatment modality will be placed on Respiratory Protocol. They will be assessed with the first treatment and at least every 72 hours thereafter. The following severity scale will be used to determine frequency of treatment intervention.     Smoking History: Pulmonary Disease or Smoking History, Greater than 15 pack year = 2    Social History  Social History     Tobacco Use    Smoking status: Current Every Day Smoker     Packs/day: 2.00     Years: 35.00     Pack years: 70.00     Start date:     Smokeless tobacco: Never Used   Substance Use Topics    Alcohol use: No    Drug use: Not Currently     Comment: hx of drug use       Recent Surgical History: None = 0  Past Surgical History  Past Surgical History:   Procedure Laterality Date    CHOLECYSTECTOMY      CYSTOSCOPY  2011    cystoscopy left ureteroscopy, left retrograde, double J stent placement    PAIN MANAGEMENT PROCEDURE Right 2019    RIGHT LUMBAR FIVE SACRAL ONE TRANSFORAMINAL EPIDURAL STEROID INJECTION SITE CONFIRMED BY FLUOROSCOPY performed by Miranda Mireles MD at (RR) greater than 35 breaths per minute. Therapy will be held for heart rate (HR) greater than 140 beats per minute, pending direction from physician. 3. Bronchodilators will be administered via Metered Dose Inhaler (MDI) with spacer when the following criteria are met:  a. Alert and cooperative     b. HR < 140 bpm  c. RR < 30 bpm                d. Can demonstrate a 2-3 second inspiratory hold  4. Bronchodilators will be administered via Hand Held Nebulizer [facility]) to patients when ANY of the following criteria are met  a. Incognizant or uncooperative          b. Patients treated with HHN at Home        c. Unable to demonstrate proper use of MDI with spacer     d. RR > 30 bpm   5. Bronchodilators will be delivered via Metered Dose Inhaler (MDI), HHN, Aerogen to intubated patients on mechanical ventilation. 6. Inhalation medication orders will be delivered and/or substituted as outlined below. Aerosolized Medications Ordering and Administration Guidelines:    1. All Medications will be ordered by a physician, and their frequency and/or modality will be adjusted as defined by the patients Respiratory Severity Index (RSI) score. 2. If the patient does not have documented COPD, consider discontinuing anticholinergics when RSI is less than 9.  3. If the bronchospasm worsens (increased RSI), then the bronchodilator frequency can be increased to a maximum of every 4 hours. If greater than every 4 hours is required, the physician will be contacted. 4. If the bronchospasm improves, the frequency of the bronchodilator can be decreased, based on the patient's RSI, but not less than home treatment regimen frequency. 5. Bronchodilator(s) will be discontinued if patient has a RSI less than 9 and has received no scheduled or as needed treatment for 72  Hrs. Patients Ordered on a Mucolytic Agent:    1. Must always be administered with a bronchodilator.     2. Discontinue if patient experiences worsened bronchospasm, or

## 2020-03-13 NOTE — PROGRESS NOTES
03/12/20 2357   Vent Information   Vent Type 840   Vent Mode AC/VC   Vt Ordered 400 mL   Rate Set 20 bmp   Peak Flow 60 L/min   Pressure Support 0 cmH20   FiO2  50 %   Sensitivity 2   PEEP/CPAP 5   I Time/ I Time % 0 s   Humidification Source Heated wire   Humidification Temp 37   Humidification Temp Measured 35.9   Circuit Condensation Drained   Vent Patient Data   High Peep/I Pressure 0   Peak Inspiratory Pressure 27 cmH2O   Mean Airway Pressure 10 cmH20   Rate Measured 20 br/min   Vt Exhaled 425 mL   Minute Volume 8.48 Liters   I:E Ratio 1:3.20   Static Compliance 33 mL/cmH2O   Dynamic Compliance 19 mL/cmH2O   Cough/Sputum   Sputum How Obtained Endotracheal   Spontaneous Breathing Trial (SBT) RT Doc   Pulse 74   SpO2 96 %   Additional Respiratory  Assessments   Resp 20   Alarm Settings   High Pressure Alarm 40 cmH2O   Low Minute Volume Alarm 5 L/min   High Respiratory Rate 40 br/min

## 2020-03-13 NOTE — PROGRESS NOTES
Reassessment completed as documented. Pt extubated at this time. Tolerating well. Ribeiro patent to bSd, no concerns noted.

## 2020-03-13 NOTE — PROGRESS NOTES
BILIDIR, BILITOT, ALKPHOS in the last 72 hours. Invalid input(s): AMYLASE,  ALB  PT/INR: No results for input(s): PROTIME, INR in the last 72 hours. APTT: No results for input(s): APTT in the last 72 hours. UA:  No results for input(s): NITRITE, COLORU, PHUR, LABCAST, WBCUA, RBCUA, MUCUS, TRICHOMONAS, YEAST, BACTERIA, CLARITYU, SPECGRAV, LEUKOCYTESUR, UROBILINOGEN, BILIRUBINUR, BLOODU, GLUCOSEU, AMORPHOUS in the last 72 hours. Invalid input(s): Juan Gilbert    Cultures:  Respiratory Panel PCR Abnormal  03/10/2020  9:00 AM [facility]   Influenza A result is equivical.   See additional report for complete Respiratory Pathogens Panel   Recommend recollection if further testing is needed. Organism Abnormal  03/10/2020  9:00 AM 15 [facility]   Adenovirus detected by PCR          Films:  XR CHEST PORTABLE   Final Result   No significant change from prior examination. Stable multifocal airspace   disease. Support tubes and lines as above. XR CHEST PORTABLE   Final Result   Mild interval improvement in appearance of airspace disease         XR CHEST PORTABLE   Final Result   Multifocal airspace disease, minimally improved in the right upper lobe. XR CHEST PORTABLE   Final Result   Right IJ CVC with tip mid SVC. XR CHEST PORTABLE   Final Result   Interval significant increased bilateral airspace disease since earlier   today, likely pulmonary edema. Interval enlargement of the cardiopericardial silhouette may be at least   partially due to low lung volumes and positioning; it also could be related   to congestive heart failure. Correlation for the possibility of pericardial   effusion is recommended. XR CHEST STANDARD (2 VW)   Final Result   New multifocal right greater than left airspace disease consistent with   developing pneumonia and/or edema. No pleural effusion.          XR CHEST PORTABLE    (Results Pending)   XR ABDOMEN (KUB) (SINGLE AP VIEW) (Results Pending)          Assessment:    Principal Problem:    Acute respiratory failure with hypoxia (HCC)  Active Problems:    Depression    Tobacco abuse    OANH (obstructive sleep apnea)    COPD with acute exacerbation (HCC)    PNA (pneumonia)    Pneumonia    Acute respiratory distress    Class 3 severe obesity with body mass index (BMI) of 45.0 to 49.9 in adult [facility])    Pneumonia, primary atypical  Resolved Problems:    * No resolved hospital problems. *         Plan:    Acute hypoxic respiratory failure  -He was admitted to the floor. He got worse. He was answered to the ICU. He was put on BiPAP. He got worse. He was intubated. Spontaneous breathing trial daily. He still wheezing minimally. Possible extubation today if he can tolerate it. Sepsis improved  -Patient became hypotensive. A central line was placed. He was started on Levophed. Most likely source of sepsis is pneumonia. This is improved. His pressure is better.       Community Acquired Pneumonia   - CXR with multifocal airsace disease R >L  - Concerns for gram + organism   - Continue Rocpehin/Azithromycin D#5  He was also started on vancomycin given the severity of his illness. His respiratory PCR panel came back showing adenovirus and is equivocal for influenza A. He was started on Tamiflu. Today is day 2..  Droplet isolation.       COPD AE  - likely 2/2 to above   - Continue IBD, IV Steroids, and IV Abx   - Pulmonology consulted  - Resp Cx: ordered      Hyperglycemia no diabetes  - may be 2/2 to steroids   - Hgb A1c normal at 5.8  - Continue to monitor      Depression  - Continue Paxil and Wellbutrin      OANH  -Patient reports he does not use any home CPAP or BiPAP     Tobacco Abuse   - Recommend cessation   - PRN Nicotine patch/gum      Morbid Obesity  - Body mass index is 47.09 kg/m². - Complicating assessment and treatment.  Placing patient at risk for multiple co-morbidities as well as early death and contributing to the patient's presentation.   - Counseled on weight loss.     DVT Prophylaxis: Lovenox   Diet: TF   Code Status: Full Code        All questions and concerns were addressed to the patient/family. Alternatives to my treatment were discussed. The note was completed using EMR. Every effort was made to ensure accuracy; however, inadvertent computerized transcription errors may be present.          David Buchanan 8:55 AM 3/13/2020

## 2020-03-13 NOTE — PROGRESS NOTES
Vancomycin Day: 5    Patient's labs, cultures, vitals, and vancomycin regimen reviewed. No changes today.   [facility] [address] [address] 9/93/165549:12 AM  .

## 2020-03-14 ENCOUNTER — APPOINTMENT (OUTPATIENT)
Dept: GENERAL RADIOLOGY | Age: 60
DRG: 208 | End: 2020-03-14
Payer: COMMERCIAL

## 2020-03-14 LAB
ANION GAP SERPL CALCULATED.3IONS-SCNC: 8 MMOL/L (ref 3–16)
BASOPHILS ABSOLUTE: 0 K/UL (ref 0–0.2)
BASOPHILS RELATIVE PERCENT: 0.2 %
BLOOD CULTURE, ROUTINE: NORMAL
BUN BLDV-MCNC: 27 MG/DL (ref 7–20)
CALCIUM SERPL-MCNC: 8.7 MG/DL (ref 8.3–10.6)
CHLORIDE BLD-SCNC: 99 MMOL/L (ref 99–110)
CO2: 34 MMOL/L (ref 21–32)
CREAT SERPL-MCNC: 0.6 MG/DL (ref 0.9–1.3)
CULTURE, BLOOD 2: NORMAL
EKG ATRIAL RATE: 79 BPM
EKG DIAGNOSIS: NORMAL
EKG P AXIS: 51 DEGREES
EKG P-R INTERVAL: 172 MS
EKG Q-T INTERVAL: 352 MS
EKG QRS DURATION: 76 MS
EKG QTC CALCULATION (BAZETT): 403 MS
EKG R AXIS: -28 DEGREES
EKG T AXIS: 50 DEGREES
EKG VENTRICULAR RATE: 79 BPM
EOSINOPHILS ABSOLUTE: 0.1 K/UL (ref 0–0.6)
EOSINOPHILS RELATIVE PERCENT: 0.4 %
GFR AFRICAN AMERICAN: >60
GFR NON-AFRICAN AMERICAN: >60
GLUCOSE BLD-MCNC: 100 MG/DL (ref 70–99)
GLUCOSE BLD-MCNC: 102 MG/DL (ref 70–99)
GLUCOSE BLD-MCNC: 103 MG/DL (ref 70–99)
GLUCOSE BLD-MCNC: 125 MG/DL (ref 70–99)
GLUCOSE BLD-MCNC: 126 MG/DL (ref 70–99)
GLUCOSE BLD-MCNC: 99 MG/DL (ref 70–99)
HCT VFR BLD CALC: 43.4 % (ref 40.5–52.5)
HEMOGLOBIN: 14.3 G/DL (ref 13.5–17.5)
LYMPHOCYTES ABSOLUTE: 1.9 K/UL (ref 1–5.1)
LYMPHOCYTES RELATIVE PERCENT: 13.1 %
MCH RBC QN AUTO: 31.3 PG (ref 26–34)
MCHC RBC AUTO-ENTMCNC: 32.9 G/DL (ref 31–36)
MCV RBC AUTO: 95.1 FL (ref 80–100)
MONOCYTES ABSOLUTE: 1.3 K/UL (ref 0–1.3)
MONOCYTES RELATIVE PERCENT: 9.3 %
NEUTROPHILS ABSOLUTE: 11.1 K/UL (ref 1.7–7.7)
NEUTROPHILS RELATIVE PERCENT: 77 %
PDW BLD-RTO: 14 % (ref 12.4–15.4)
PERFORMED ON: ABNORMAL
PERFORMED ON: NORMAL
PHOSPHORUS: 3.8 MG/DL (ref 2.5–4.9)
PLATELET # BLD: 232 K/UL (ref 135–450)
PMV BLD AUTO: 8.2 FL (ref 5–10.5)
POTASSIUM REFLEX MAGNESIUM: 4.2 MMOL/L (ref 3.5–5.1)
RBC # BLD: 4.57 M/UL (ref 4.2–5.9)
SODIUM BLD-SCNC: 141 MMOL/L (ref 136–145)
WBC # BLD: 14.3 K/UL (ref 4–11)

## 2020-03-14 PROCEDURE — 6360000002 HC RX W HCPCS: Performed by: PHYSICIAN ASSISTANT

## 2020-03-14 PROCEDURE — 6370000000 HC RX 637 (ALT 250 FOR IP): Performed by: INTERNAL MEDICINE

## 2020-03-14 PROCEDURE — 80048 BASIC METABOLIC PNL TOTAL CA: CPT

## 2020-03-14 PROCEDURE — 97166 OT EVAL MOD COMPLEX 45 MIN: CPT

## 2020-03-14 PROCEDURE — 2000000000 HC ICU R&B

## 2020-03-14 PROCEDURE — 84100 ASSAY OF PHOSPHORUS: CPT

## 2020-03-14 PROCEDURE — 2500000003 HC RX 250 WO HCPCS: Performed by: INTERNAL MEDICINE

## 2020-03-14 PROCEDURE — 97530 THERAPEUTIC ACTIVITIES: CPT

## 2020-03-14 PROCEDURE — 93010 ELECTROCARDIOGRAM REPORT: CPT | Performed by: INTERNAL MEDICINE

## 2020-03-14 PROCEDURE — 97162 PT EVAL MOD COMPLEX 30 MIN: CPT

## 2020-03-14 PROCEDURE — 99233 SBSQ HOSP IP/OBS HIGH 50: CPT | Performed by: INTERNAL MEDICINE

## 2020-03-14 PROCEDURE — 93005 ELECTROCARDIOGRAM TRACING: CPT | Performed by: HOSPITALIST

## 2020-03-14 PROCEDURE — 85025 COMPLETE CBC W/AUTO DIFF WBC: CPT

## 2020-03-14 PROCEDURE — 94761 N-INVAS EAR/PLS OXIMETRY MLT: CPT

## 2020-03-14 PROCEDURE — 2700000000 HC OXYGEN THERAPY PER DAY

## 2020-03-14 PROCEDURE — 6370000000 HC RX 637 (ALT 250 FOR IP): Performed by: PHYSICIAN ASSISTANT

## 2020-03-14 PROCEDURE — 2580000003 HC RX 258: Performed by: PHYSICIAN ASSISTANT

## 2020-03-14 PROCEDURE — 2580000003 HC RX 258: Performed by: INTERNAL MEDICINE

## 2020-03-14 PROCEDURE — 97110 THERAPEUTIC EXERCISES: CPT

## 2020-03-14 PROCEDURE — 94640 AIRWAY INHALATION TREATMENT: CPT

## 2020-03-14 PROCEDURE — 6360000002 HC RX W HCPCS: Performed by: INTERNAL MEDICINE

## 2020-03-14 RX ORDER — OSELTAMIVIR PHOSPHATE 75 MG/1
75 CAPSULE ORAL 2 TIMES DAILY
Status: DISCONTINUED | OUTPATIENT
Start: 2020-03-14 | End: 2020-03-17 | Stop reason: HOSPADM

## 2020-03-14 RX ORDER — LEVOFLOXACIN 500 MG/1
500 TABLET, FILM COATED ORAL DAILY
Status: DISCONTINUED | OUTPATIENT
Start: 2020-03-14 | End: 2020-03-17 | Stop reason: HOSPADM

## 2020-03-14 RX ORDER — MAGNESIUM SULFATE IN WATER 40 MG/ML
4 INJECTION, SOLUTION INTRAVENOUS ONCE
Status: DISCONTINUED | OUTPATIENT
Start: 2020-03-14 | End: 2020-03-14

## 2020-03-14 RX ADMIN — LEVOFLOXACIN 500 MG: 500 TABLET, FILM COATED ORAL at 09:09

## 2020-03-14 RX ADMIN — Medication 10 ML: at 09:09

## 2020-03-14 RX ADMIN — GABAPENTIN 100 MG: 100 CAPSULE ORAL at 09:09

## 2020-03-14 RX ADMIN — GABAPENTIN 100 MG: 100 CAPSULE ORAL at 21:17

## 2020-03-14 RX ADMIN — BUPROPION HYDROCHLORIDE 150 MG: 75 TABLET, FILM COATED ORAL at 09:09

## 2020-03-14 RX ADMIN — ENOXAPARIN SODIUM 40 MG: 40 INJECTION SUBCUTANEOUS at 18:06

## 2020-03-14 RX ADMIN — MUPIROCIN: 20 OINTMENT TOPICAL at 21:18

## 2020-03-14 RX ADMIN — FAMOTIDINE 20 MG: 10 INJECTION INTRAVENOUS at 21:17

## 2020-03-14 RX ADMIN — METHYLPREDNISOLONE SODIUM SUCCINATE 40 MG: 40 INJECTION, POWDER, FOR SOLUTION INTRAMUSCULAR; INTRAVENOUS at 09:09

## 2020-03-14 RX ADMIN — VANCOMYCIN HYDROCHLORIDE 1750 MG: 10 INJECTION, POWDER, LYOPHILIZED, FOR SOLUTION INTRAVENOUS at 05:20

## 2020-03-14 RX ADMIN — FAMOTIDINE 20 MG: 10 INJECTION INTRAVENOUS at 09:09

## 2020-03-14 RX ADMIN — OSELTAMIVIR PHOSPHATE 75 MG: 75 CAPSULE ORAL at 21:17

## 2020-03-14 RX ADMIN — PAROXETINE HYDROCHLORIDE 40 MG: 20 TABLET, FILM COATED ORAL at 18:06

## 2020-03-14 RX ADMIN — OSELTAMIVIR PHOSPHATE 75 MG: 75 CAPSULE ORAL at 09:09

## 2020-03-14 RX ADMIN — ACETAMINOPHEN 650 MG: 325 TABLET ORAL at 02:44

## 2020-03-14 RX ADMIN — IPRATROPIUM BROMIDE AND ALBUTEROL SULFATE 1 AMPULE: 2.5; .5 SOLUTION RESPIRATORY (INHALATION) at 15:13

## 2020-03-14 RX ADMIN — IPRATROPIUM BROMIDE AND ALBUTEROL SULFATE 1 AMPULE: 2.5; .5 SOLUTION RESPIRATORY (INHALATION) at 10:54

## 2020-03-14 RX ADMIN — ONDANSETRON HYDROCHLORIDE 4 MG: 2 INJECTION, SOLUTION INTRAMUSCULAR; INTRAVENOUS at 02:44

## 2020-03-14 RX ADMIN — Medication 10 ML: at 21:18

## 2020-03-14 RX ADMIN — IPRATROPIUM BROMIDE AND ALBUTEROL SULFATE 1 AMPULE: 2.5; .5 SOLUTION RESPIRATORY (INHALATION) at 20:48

## 2020-03-14 RX ADMIN — BUPROPION HYDROCHLORIDE 150 MG: 75 TABLET, FILM COATED ORAL at 21:17

## 2020-03-14 RX ADMIN — IPRATROPIUM BROMIDE AND ALBUTEROL SULFATE 1 AMPULE: 2.5; .5 SOLUTION RESPIRATORY (INHALATION) at 07:12

## 2020-03-14 RX ADMIN — GABAPENTIN 100 MG: 100 CAPSULE ORAL at 13:54

## 2020-03-14 ASSESSMENT — PAIN SCALES - GENERAL
PAINLEVEL_OUTOF10: 0
PAINLEVEL_OUTOF10: 5

## 2020-03-14 NOTE — PROGRESS NOTES
Perfect serve message sent to Dr. Viviana Hernandez in regards to Pt's complaint of mild Chest pain. Will continue to monitor.

## 2020-03-14 NOTE — PROGRESS NOTES
Lateral rotation therapy turned on on bed- spoke w/ patient and educated on importance of repositioning frequently. Pt agreed that he isn't moving as much and was agreeable to the lateral rotation therapy.   Brynn Roque RN, BSN

## 2020-03-14 NOTE — FLOWSHEET NOTE
03/13/20 2000   Vitals   Temp 98.4 °F (36.9 °C)   Temp Source Oral   Pulse 83   Heart Rate Source Monitor   Resp 28   /79   MAP (mmHg) 95   Level of Consciousness 0   MEWS Score 2   Oxygen Therapy   SpO2 93 %   O2 Device Heated high flow cannula   O2 Flow Rate (L/min) 4 L/min   Vital signs stable. Pt is alert and oriented and complains of nausea, indigestion, and headache pain rated 5/10 at the moment. PRN tylenol and scheduled pepcid provided. Pt noted with one bout of self-limiting emesis. Nothing new noted on head to toe assessment. Lung sounds diminished. Ribeiro remains secure in place and functioning properly. All ICU monitoring devices remain in place. Bath provided. Pt is NSR on the monitor. Evening medication administration completed. Pt denies any further assistance at the moment. Will continue to monitor.

## 2020-03-14 NOTE — PROGRESS NOTES
Required assistance from family for: Independent PTA    Pt. Fully independent for transfers and gait and walked with: No Device       Pain   Yes  Location: primarily BLE with AAROM  Ratin /10 at rest, increased to severe post  AAROM  Pain Medicine Status: RN notified    Cognition    A&O Person, Place, Time and Situation   Able to follow 2 step commands    Subjective  Patient lying supine in bed with no family present  Pt agreeable to this PT eval & tx. States he feels like ajay was hit by bricks    Upper Extremity ROM/Strength  Please see OT evaluation. Lower Extremity ROM / Strength    AROM WFL: No   ROM limitations: all joints limited by pain  Unable to complete AROM    Lower Extremity Sensation    WFL    Lower Extremity Proprioception:   NT    Coordination and Tone  NT    Balance  Static Sitting:  Not tested   Tolerance:   Dynamic Sitting:  Not tested  Static Standing: Not tested   Tolerance:   Dynamic Standing: Not tested    Bed Mobility   Supine to Sit:   Not Tested  Sit to Supine:  Not Tested  Rolling:   Not Tested  Scooting at EOB: Not Tested  Scooting to [facility]:  Not Tested    Transfer Training     Sit to stand:   Not Tested  Stand to sit:   Not Tested  Bed to Chair:  Not Tested with use of N/A    Gait gait deferred due to pain with AROM; pt ambulated 0 ft. Activity Tolerance   Pt completed therapy session with Pain noted with primarily in BLE's with AAROM  SpO2: 94 using 4 L  HR:71  BP: 144/78    Positioning Needs   Pt instructed and educated on use of call light to call for assist if desiring to get up or change position, call light handed to pt and needs within reach and Pt remained in bed    Exercises Initiated    AP and Heel slide and hip abd    Other  None. Patient/Family Education   Pt educated on role of inpatient PT, POC, importance of continued activity, DC recommendations and calling for assist with mobility.     Assessment  Pt seen for Physical Therapy evaluation in acute care

## 2020-03-14 NOTE — PROGRESS NOTES
Pulmonary & Critical Care Medicine ICU Progress Note    CC: pneumonia    Events of Last 24 hours: extubated without complication yesterday. N/V overnight    Invasive Lines: IV: RIJ CVC    MV:  3/9-3/13  Vent Mode: AC/VC Rate Set: 0 bmp/Vt Ordered: 400 mL/ /FiO2 : 45 %  Recent Labs     03/13/20  0600 03/13/20  1048   PHART 7.356 7.408   PUH4ICC 58.3* 52.6*   PO2ART 89.7 70.5*       IV:   propofol Stopped (03/13/20 0840)    fentaNYL (SUBLIMAZE) infusion Stopped (03/13/20 0738)       Vitals:  /77   Pulse 77   Temp 98.4 °F (36.9 °C) (Oral)   Resp 16   Ht 5' 5\" (1.651 m)   Wt 277 lb 12.5 oz (126 kg)   SpO2 94%   BMI 46.22 kg/m²   on 4lpm  EXAM:  Constitutional: ill appearing. Eyes: PERRL. No sclera icterus. ETT in place  ENT: Normal Nose. Normal Tongue. Neck:  Trachea is midline. No thyroid tenderness. Respiratory: No accessory muscle usage. + wheezes. No rales. No Rhonchi. Cardiovascular: Normal S1S2. Aneita Maryjo No murmur. No digit cyanosis. No digit clubbing. No LE edema. GI: Non-tender. +distended. Normal bowel sounds. No masses. No umbilical hernia. Skin: No rash on the exposed extremities. No Nodules on exposed extremities. Neuro: Alert and oriented. Follows commands. Moves all extremities. Psych:  No agitation, no anxiety.     Scheduled Meds:   oseltamivir  75 mg Oral BID    ipratropium-albuterol  1 ampule Inhalation Q4H    methylPREDNISolone  40 mg Intravenous Q24H    vancomycin  1,750 mg Intravenous Q18H    mupirocin   Nasal BID    chlorhexidine  15 mL Mouth/Throat BID    famotidine (PEPCID) injection  20 mg Intravenous BID    cefTRIAXone (ROCEPHIN) IV  1 g Intravenous Q24H    buPROPion  150 mg Oral BID    gabapentin  100 mg Oral TID    PARoxetine  40 mg Oral QPM    sodium chloride flush  10 mL Intravenous 2 times per day    enoxaparin  40 mg Subcutaneous Daily    influenza virus vaccine  0.5 mL Intramuscular Once    nicotine  1 patch Transdermal Daily    azithromycin  500 mg

## 2020-03-14 NOTE — PROGRESS NOTES
PRN tylenol provided for non-radiating mid-sternal CP rated 5/10. Zofran provided for complaints of nausea. Will continue to monitor.

## 2020-03-14 NOTE — PROGRESS NOTES
gallops  Abdomen:  Soft, undistended, non tender, no organomegaly, BS present  Extremities: 2+ peripheral edema. Distal pulses well felt  Neurological : grossly normal        Medications:  Scheduled Meds:   oseltamivir  75 mg Oral BID    ipratropium-albuterol  1 ampule Inhalation Q4H    methylPREDNISolone  40 mg Intravenous Q24H    vancomycin  1,750 mg Intravenous Q18H    mupirocin   Nasal BID    chlorhexidine  15 mL Mouth/Throat BID    famotidine (PEPCID) injection  20 mg Intravenous BID    cefTRIAXone (ROCEPHIN) IV  1 g Intravenous Q24H    buPROPion  150 mg Oral BID    gabapentin  100 mg Oral TID    PARoxetine  40 mg Oral QPM    sodium chloride flush  10 mL Intravenous 2 times per day    enoxaparin  40 mg Subcutaneous Daily    influenza virus vaccine  0.5 mL Intramuscular Once    nicotine  1 patch Transdermal Daily    azithromycin  500 mg Intravenous Q24H       PRN Meds:  midazolam, fentanNYL, sodium chloride flush, acetaminophen **OR** acetaminophen, polyethylene glycol, promethazine **OR** ondansetron, albuterol    Results:  CBC:   Recent Labs     03/12/20  0500 03/13/20  0550 03/14/20  0527   WBC 13.2* 12.9* 14.3*   HGB 13.8 14.0 14.3   HCT 42.3 43.0 43.4   MCV 94.9 94.0 95.1    245 232     BMP:   Recent Labs     03/12/20  0500 03/13/20  0550 03/14/20  0527    140 141   K 4.2 4.2 4.2   CL 97* 98* 99   CO2 31 32 34*   PHOS  --   --  3.8   BUN 23* 22* 27*   CREATININE 0.8* 0.8* 0.6*     LIVER PROFILE: No results for input(s): AST, ALT, LIPASE, BILIDIR, BILITOT, ALKPHOS in the last 72 hours. Invalid input(s): AMYLASE,  ALB  PT/INR: No results for input(s): PROTIME, INR in the last 72 hours. APTT: No results for input(s): APTT in the last 72 hours. UA:  No results for input(s): NITRITE, COLORU, PHUR, LABCAST, WBCUA, RBCUA, MUCUS, TRICHOMONAS, YEAST, BACTERIA, CLARITYU, SPECGRAV, LEUKOCYTESUR, UROBILINOGEN, BILIRUBINUR, BLOODU, GLUCOSEU, AMORPHOUS in the last 72 hours.     Invalid ([facility] [address]    PNA (pneumonia)    Pneumonia    Acute respiratory distress    Class 3 severe obesity with body mass index (BMI) of 45.0 to 49.9 in adult [facility])    Pneumonia, primary atypical  Resolved Problems:    * No resolved hospital problems. *         Plan:    Acute hypoxic respiratory failure  - sec to CAP and influenza A, copd exacerbation   -He was admitted to the floor. He got worse. He was answered to the ICU. He was put on BiPAP. He got worse. He was intubated and eventually weaned off, now on NC . Wean oxygen as able     Sepsis improved  -Patient became hypotensive. A central line was placed. He was started on Levophed. Most likely source of sepsis is pneumonia. This is improved. His pressure is better.       Community Acquired Pneumonia   - CXR with multifocal airsace disease R >L  - Concerns for gram + organism   - Continue Rocpehin/Azithromycin D#5  He was also started on vancomycin given the severity of his illness. Influenza A   - supportive care, resp isolation  - Tamiflu      COPD AE  - likely 2/2 to above   - Continue IBD, IV Steroids, and IV Abx   - Pulmonology consulted  - Resp Cx: ordered      Hyperglycemia no diabetes  - may be 2/2 to steroids   - Hgb A1c normal at 5.8  - Continue to monitor      Depression  - Continue Paxil and Wellbutrin      OANH  -Patient reports he does not use any home CPAP or BiPAP     Tobacco Abuse   - Recommend cessation   - PRN Nicotine patch/gum      Morbid Obesity  - Body mass index is 47.09 kg/m². - Complicating assessment and treatment.  Placing patient at risk for multiple co-morbidities as well as early death and contributing to the patient's presentation.   - Counseled on weight loss.     DVT Prophylaxis: Lovenox   Diet: carb diet  Code Status: Full Code      Darlyn Hence 8:12 AM 3/14/2020

## 2020-03-14 NOTE — PROGRESS NOTES
Bedside report and Pt care transferred to [address] [place] [address]. Pt denies any assistance at this time.

## 2020-03-14 NOTE — PROGRESS NOTES
Inpatient Occupational Therapy  Evaluation and Treatment    Unit: ICU  Date:  3/14/2020  Patient Name:    Mk Moura  Admitting diagnosis:  PNA (pneumonia) [J18.9]  Pneumonia [J18.9]  PNA (pneumonia) [J18.9]  Pneumonia [J18.9]  Admit Date:  3/9/2020  Precautions/Restrictions/WB Status/ Lines/ Wounds/ Oxygen: fall risk, IV, deluca catheter , supplemental O2 (4L), ICU monitoring, continuous pulse ox, contact precautions and droplet precautions    Treatment Time:  6937-5013  Treatment Number: 1   Billable Treatment Time: 30 minutes   Total Treatment Time:   40   minutes    Patient Goals for Therapy:  \" to feel better \"      Discharge Recommendations: Acute Rehab (ARU)/ Inpatient [address] ([facility])  DME needs for discharge: defer to facility       Therapy recommendations for staff:   Passive range of motion to all available joints. History of Present Illness: 64 y.o. male who presents to the ED for shortness of breath. Not having chest pain. Ongoing for almost 4 days. Was sent by his primary care physician. Has been on a Medrol Dosepak and doxycycline without improvement. Feels like prior COPD exacerbations. Has not seen a pulmonologist in over a year. No fever or chills. No nausea or vomiting. No unilateral leg pain or swelling. Intubated 3/9/2020  Extubated 3/13/2020     Home Health S4 Level Recommendation:  NA  AM-PAC Score: 6    Preadmission Environment    Pt. Lives with spouse and 24/7 Assist Available   Home environment:  one story home  Steps to enter first floor:   ramp    Steps to second floor: N/A  Bathroom:  Tub/Shower unit  Equipment owned:  [employer]     Preadmission Status / [facility]:  History of falls   Yes - \"I fell off my dump truck, that's what hurt my hip, prolly 3 months ago\"   Pt. Able to drive   Yes  Pt Fully independent with ADL's  Yes  Pt. Required assistance from family for: Independent PTA    Pt.  Fully independent for transfers and gait and walked with: No Strength and Transfers. Pt functioning below baseline and will likely benefit from skilled occupational therapy services to maximize safety and independence. Goal(s) : To be met in 3 Visits:  1). Bed to [facility]: Will tolerate assessment     To be met in 5 Visits:  1). Supine to Sit: Mod A  2). Upper Body Bathing: Mod A  3). Lower Body Bathing: Mod A  4). Upper Body Dressing: Mod A  5). Lower Body Dressing: Mod A  6). Pt to abe UE exs x 15 reps    Rehabilitation Potential:  Good for goals listed above. Strengths for achieving goals include: Pt motivated, PLOF and Pt cooperative  Barriers to achieving goals include:  Complexity of condition     Plan: To be seen 3-5 x/wk while in acute care setting for therapeutic exercises, bed mobility, transfers, dressing, bathing, family/patient education with adaptive equipment, breathing technique instruction.      LIZANDRO Munoz, OTR/L   [id number]           If patient discharges from this facility prior to next visit, this note will serve as the Discharge Summary

## 2020-03-14 NOTE — PROGRESS NOTES
A: Assessment completed and documented, call light is in reach, discussed plan of care with patient who agreed, bed exit alarm is on for added safety.

## 2020-03-15 LAB
ANION GAP SERPL CALCULATED.3IONS-SCNC: 7 MMOL/L (ref 3–16)
BASOPHILS ABSOLUTE: 0.1 K/UL (ref 0–0.2)
BASOPHILS RELATIVE PERCENT: 0.4 %
BUN BLDV-MCNC: 23 MG/DL (ref 7–20)
CALCIUM SERPL-MCNC: 8.8 MG/DL (ref 8.3–10.6)
CHLORIDE BLD-SCNC: 96 MMOL/L (ref 99–110)
CO2: 35 MMOL/L (ref 21–32)
CREAT SERPL-MCNC: 0.7 MG/DL (ref 0.9–1.3)
EOSINOPHILS ABSOLUTE: 0.1 K/UL (ref 0–0.6)
EOSINOPHILS RELATIVE PERCENT: 0.6 %
GFR AFRICAN AMERICAN: >60
GFR NON-AFRICAN AMERICAN: >60
GLUCOSE BLD-MCNC: 85 MG/DL (ref 70–99)
HCT VFR BLD CALC: 43.7 % (ref 40.5–52.5)
HEMOGLOBIN: 14.3 G/DL (ref 13.5–17.5)
LYMPHOCYTES ABSOLUTE: 2.2 K/UL (ref 1–5.1)
LYMPHOCYTES RELATIVE PERCENT: 15.6 %
MCH RBC QN AUTO: 31 PG (ref 26–34)
MCHC RBC AUTO-ENTMCNC: 32.6 G/DL (ref 31–36)
MCV RBC AUTO: 95 FL (ref 80–100)
MONOCYTES ABSOLUTE: 1.3 K/UL (ref 0–1.3)
MONOCYTES RELATIVE PERCENT: 9.3 %
NEUTROPHILS ABSOLUTE: 10.4 K/UL (ref 1.7–7.7)
NEUTROPHILS RELATIVE PERCENT: 74.1 %
PDW BLD-RTO: 13.6 % (ref 12.4–15.4)
PLATELET # BLD: 223 K/UL (ref 135–450)
PMV BLD AUTO: 8.2 FL (ref 5–10.5)
POTASSIUM REFLEX MAGNESIUM: 3.8 MMOL/L (ref 3.5–5.1)
RBC # BLD: 4.6 M/UL (ref 4.2–5.9)
SODIUM BLD-SCNC: 138 MMOL/L (ref 136–145)
WBC # BLD: 14 K/UL (ref 4–11)

## 2020-03-15 PROCEDURE — 99232 SBSQ HOSP IP/OBS MODERATE 35: CPT | Performed by: INTERNAL MEDICINE

## 2020-03-15 PROCEDURE — 97110 THERAPEUTIC EXERCISES: CPT

## 2020-03-15 PROCEDURE — 85025 COMPLETE CBC W/AUTO DIFF WBC: CPT

## 2020-03-15 PROCEDURE — 2700000000 HC OXYGEN THERAPY PER DAY

## 2020-03-15 PROCEDURE — 2500000003 HC RX 250 WO HCPCS: Performed by: INTERNAL MEDICINE

## 2020-03-15 PROCEDURE — 6360000002 HC RX W HCPCS: Performed by: PHYSICIAN ASSISTANT

## 2020-03-15 PROCEDURE — 6370000000 HC RX 637 (ALT 250 FOR IP): Performed by: INTERNAL MEDICINE

## 2020-03-15 PROCEDURE — 94640 AIRWAY INHALATION TREATMENT: CPT

## 2020-03-15 PROCEDURE — 6370000000 HC RX 637 (ALT 250 FOR IP): Performed by: PHYSICIAN ASSISTANT

## 2020-03-15 PROCEDURE — 2580000003 HC RX 258: Performed by: PHYSICIAN ASSISTANT

## 2020-03-15 PROCEDURE — 36592 COLLECT BLOOD FROM PICC: CPT

## 2020-03-15 PROCEDURE — 97535 SELF CARE MNGMENT TRAINING: CPT

## 2020-03-15 PROCEDURE — 99233 SBSQ HOSP IP/OBS HIGH 50: CPT | Performed by: INTERNAL MEDICINE

## 2020-03-15 PROCEDURE — 97530 THERAPEUTIC ACTIVITIES: CPT

## 2020-03-15 PROCEDURE — 6360000002 HC RX W HCPCS: Performed by: INTERNAL MEDICINE

## 2020-03-15 PROCEDURE — 2000000000 HC ICU R&B

## 2020-03-15 PROCEDURE — 94761 N-INVAS EAR/PLS OXIMETRY MLT: CPT

## 2020-03-15 PROCEDURE — 80048 BASIC METABOLIC PNL TOTAL CA: CPT

## 2020-03-15 RX ORDER — FUROSEMIDE 10 MG/ML
20 INJECTION INTRAMUSCULAR; INTRAVENOUS ONCE
Status: COMPLETED | OUTPATIENT
Start: 2020-03-15 | End: 2020-03-15

## 2020-03-15 RX ORDER — PAROXETINE HYDROCHLORIDE 20 MG/1
40 TABLET, FILM COATED ORAL EVERY EVENING
Status: DISCONTINUED | OUTPATIENT
Start: 2020-03-16 | End: 2020-03-17 | Stop reason: HOSPADM

## 2020-03-15 RX ORDER — BUPROPION HYDROCHLORIDE 75 MG/1
75 TABLET ORAL 2 TIMES DAILY
Status: DISCONTINUED | OUTPATIENT
Start: 2020-03-16 | End: 2020-03-17 | Stop reason: HOSPADM

## 2020-03-15 RX ORDER — GABAPENTIN 100 MG/1
100 CAPSULE ORAL 3 TIMES DAILY
Status: DISCONTINUED | OUTPATIENT
Start: 2020-03-16 | End: 2020-03-17 | Stop reason: HOSPADM

## 2020-03-15 RX ADMIN — IPRATROPIUM BROMIDE AND ALBUTEROL SULFATE 1 AMPULE: 2.5; .5 SOLUTION RESPIRATORY (INHALATION) at 03:29

## 2020-03-15 RX ADMIN — IPRATROPIUM BROMIDE AND ALBUTEROL SULFATE 1 AMPULE: 2.5; .5 SOLUTION RESPIRATORY (INHALATION) at 11:11

## 2020-03-15 RX ADMIN — IPRATROPIUM BROMIDE AND ALBUTEROL SULFATE 1 AMPULE: 2.5; .5 SOLUTION RESPIRATORY (INHALATION) at 07:03

## 2020-03-15 RX ADMIN — IPRATROPIUM BROMIDE AND ALBUTEROL SULFATE 1 AMPULE: 2.5; .5 SOLUTION RESPIRATORY (INHALATION) at 00:08

## 2020-03-15 RX ADMIN — FAMOTIDINE 20 MG: 10 INJECTION INTRAVENOUS at 07:47

## 2020-03-15 RX ADMIN — OSELTAMIVIR PHOSPHATE 75 MG: 75 CAPSULE ORAL at 07:47

## 2020-03-15 RX ADMIN — IPRATROPIUM BROMIDE AND ALBUTEROL SULFATE 1 AMPULE: 2.5; .5 SOLUTION RESPIRATORY (INHALATION) at 15:28

## 2020-03-15 RX ADMIN — LEVOFLOXACIN 500 MG: 500 TABLET, FILM COATED ORAL at 07:47

## 2020-03-15 RX ADMIN — Medication 10 ML: at 07:47

## 2020-03-15 RX ADMIN — IPRATROPIUM BROMIDE AND ALBUTEROL SULFATE 1 AMPULE: 2.5; .5 SOLUTION RESPIRATORY (INHALATION) at 22:40

## 2020-03-15 RX ADMIN — ENOXAPARIN SODIUM 40 MG: 40 INJECTION SUBCUTANEOUS at 18:13

## 2020-03-15 RX ADMIN — FUROSEMIDE 20 MG: 10 INJECTION, SOLUTION INTRAMUSCULAR; INTRAVENOUS at 13:48

## 2020-03-15 RX ADMIN — FAMOTIDINE 20 MG: 10 INJECTION INTRAVENOUS at 21:18

## 2020-03-15 RX ADMIN — GABAPENTIN 100 MG: 100 CAPSULE ORAL at 07:47

## 2020-03-15 RX ADMIN — Medication 10 ML: at 21:18

## 2020-03-15 RX ADMIN — OSELTAMIVIR PHOSPHATE 75 MG: 75 CAPSULE ORAL at 21:18

## 2020-03-15 RX ADMIN — IPRATROPIUM BROMIDE AND ALBUTEROL SULFATE 1 AMPULE: 2.5; .5 SOLUTION RESPIRATORY (INHALATION) at 19:25

## 2020-03-15 RX ADMIN — BUPROPION HYDROCHLORIDE 150 MG: 75 TABLET, FILM COATED ORAL at 07:47

## 2020-03-15 ASSESSMENT — PAIN SCALES - GENERAL: PAINLEVEL_OUTOF10: 0

## 2020-03-15 NOTE — PROGRESS NOTES
Occupational Therapy Daily Treatment Note    Unit: ICU  Date:  3/15/2020  Patient Name:    Troy Bolton  Admitting diagnosis:  PNA (pneumonia) [J18.9]  Pneumonia [J18.9]  PNA (pneumonia) [J18.9]  Pneumonia [J18.9]  Admit Date:  3/9/2020  Precautions/Restrictions:  fall risk, IV, deluca catheter , supplemental O2 (3 L L), contact precautions and droplet precautions      Discharge Recommendations: Acute Rehab (ARU)/ Inpatient Rehab Facility (IRF)  DME needs for discharge: defer to facility       Therapy recommendations for staff:   Bed mobility and bed exercises    AM-PAC Score: 12  Home Health S4 Level: NA       Treatment Time:  4039-6523  Treatment number:  2    Total Treatment Time:   45 minutes      Subjective:  Pt reports he wants to go home. Pt reports goal of getting back to work as a . Pain   Yes  Rating: mild  Location: all over   Pain Medicine Status: No request made      Bed Mobility:   Supine to Sit:  Not Tested  Sit to Supine:  Not Tested  Rolling:           Min A bilaterally   Scooting:        SBA to scoot to [facility]     Transfer Training:   Sit to stand:   Not Tested  Stand to sit:  Not Tested  Bed to Chair:  Not Tested  Bed to [facility]:   Not Tested  Standard toilet:   Not Tested    Activity Tolerance   Pt completed therapy session with Pain noted with all movement. SpO2: >90%   HR: 80s with activity   BP: 140/82 at rest    ADL Training:   Upper body dressing:  Not Tested  Upper body bathing:  Not Tested  Lower body dressing:  Not Tested  Lower body bathing:  Not Tested  Toileting: Total A , bed pan   Grooming/Hygiene:  Not Tested    Therapeutic Exercise:   Hand flex/ext:  x10  Wrist flex/ext:  x10  Forearm sup/pronation:  x5  Elbow flex/ext:  x5   Shoulder flex/ext:  x5  Shoulder rolls CW/CCW: x10    Patient Education:   Role of OT  Recommendations for DC    Positioning Needs: In bed, call light and needs in reach.       Family Present:  No    Assessment: Pt with great progress this date. Pt with significant decrease in swelling allowing for improved ROM. Pt also with improved strength as he was able to lift BUE overhead. Pt may benefit from continued skilled occupational therapy while in the hospital and upon d/c in order to progress to safe and more indpt functioning. GOALS  To be met in 3 Visits:  1). Bed to [facility]: Will tolerate assessment      To be met in 5 Visits:  1). Supine to Sit: Mod A  2). Upper Body Bathing: Mod A  3). Lower Body Bathing: Mod A  4). Upper Body Dressing: Mod A  5). Lower Body Dressing: Mod A  6).  Pt to abe UE exs x 15 reps      Plan: cont with LIZANDRO Del Castillo, OTR/L   [id number]       If patient discharges from this facility prior to next visit, this note will serve as the Discharge Summary

## 2020-03-15 NOTE — PLAN OF CARE
Problem: Falls - Risk of:  Goal: Will remain free from falls  Description: Will remain free from falls  Outcome: Ongoing  Goal: Absence of physical injury  Description: Absence of physical injury  Outcome: Ongoing     Problem: Pain:  Description: Pain management should include both nonpharmacologic and pharmacologic interventions.   Goal: Pain level will decrease  Description: Pain level will decrease  Outcome: Ongoing  Goal: Control of acute pain  Description: Control of acute pain  Outcome: Ongoing  Goal: Control of chronic pain  Description: Control of chronic pain  Outcome: Ongoing     Problem: Restraint Use - Nonviolent/Non-Self-Destructive Behavior:  Goal: Absence of restraint indications  Description: Absence of restraint indications  Outcome: Ongoing  Goal: Absence of restraint-related injury  Description: Absence of restraint-related injury  Outcome: Ongoing     Problem: Nutrition  Goal: Optimal nutrition therapy  Outcome: Ongoing  Goal: Understanding of nutritional guidelines  Outcome: Ongoing     Problem: Airway Clearance - Ineffective:  Goal: Clear lung sounds  Description: Clear lung sounds  Outcome: Ongoing  Goal: Ability to maintain a clear airway will improve  Description: Ability to maintain a clear airway will improve  Outcome: Ongoing     Problem: Gas Exchange - Impaired:  Goal: Levels of oxygenation will improve  Description: Levels of oxygenation will improve  Outcome: Ongoing     Problem: Respiratory:  Goal: Respiratory status will improve  Description: Respiratory status will improve  Outcome: Ongoing

## 2020-03-15 NOTE — PROGRESS NOTES
Inpatient Physical Therapy Daily Treatment Note    Unit: ICU  Date:  3/15/2020  Patient Name:    Sasha Officer  Admitting diagnosis:  PNA (pneumonia) [J18.9]  Pneumonia [J18.9]  PNA (pneumonia) [J18.9]  Pneumonia [J18.9]  Admit Date:  3/9/2020  Precautions/Restrictions:  fall risk, IV, deluca catheter , supplemental O2 (3 L L), contact precautions and droplet precautions      Discharge Recommendations: Acute Rehab (ARU)/ Inpatient Rehab Facility (IRF)  DME needs for discharge: defer to facility       Therapy recommendation for EMS Transport: requires transport by cot due to inability to transfer out of bed    Therapy recommendations for staff:   Bed mobility and bed exercises    History of Present Illness: 59 y. o. male who presents to the ED for shortness of breath.  Not having chest pain.  Ongoing for almost 4 days.  Was sent by his primary care physician. Po Beauchamp been on a Medrol Dosepak and doxycycline without improvement.  Feels like prior COPD exacerbations.  Has not seen a pulmonologist in over a year.  No fever or chills.  No nausea or vomiting.  No unilateral leg pain or swelling.      Intubated 3/9/2020  Extubated 3/13/2020     Home Health S4 Level Recommendation: NA  AM-PAC Mobility Score   AM-PAC Inpatient Mobility Raw Score : 8       Treatment Time:  14:35-15:20  Treatment number: 2  Timed Code Treatment Minutes:  45minutes  Total Treatment Minutes:  45  minutes    Cognition    A&O orientation not directly assessed. Able to follow 2 step commands    Subjective  Patient lying supine in bed with no family present  Pt agreeable to this PT tx.    Currently using bed pan, but finished    Pain   Yes  Location: shoulders and R hip  Rating:  mild/10  Pain Medicine Status: No request made     Bed Mobility   Supine to Sit:   Not Tested  Sit to Supine:  Not Tested  Rolling:   Min A  Scooting:   Not Tested    Transfer Training     Sit to stand:   Not Tested  Stand to sit:   Not Tested  Bed to Chair:  Not Tested with

## 2020-03-15 NOTE — PROGRESS NOTES
Pulmonary & Critical Care Medicine ICU Progress Note    CC: pneumonia    Events of Last 24 hours: has not been able to get out of bed. Wants to go home    Invasive Lines: IV: RIJ CVC    MV:  3/9-3/13  Vent Mode: AC/VC Rate Set: 0 bmp/Vt Ordered: 400 mL/ /FiO2 : 45 %  Recent Labs     03/13/20  0600 03/13/20  1048   PHART 7.356 7.408   SXC2WJG 58.3* 52.6*   PO2ART 89.7 70.5*     Vitals:  /82   Pulse 88   Temp 98.5 °F (36.9 °C) (Oral)   Resp 16   Ht 5' 5\" (1.651 m)   Wt 277 lb 12.5 oz (126 kg)   SpO2 90%   BMI 46.22 kg/m²   on 3lpm  EXAM:  Constitutional: ill appearing. Eyes: PERRL. No sclera icterus. ENT: Normal Nose. Normal Tongue. Neck:  Trachea is midline. No thyroid tenderness. Respiratory: No accessory muscle usage. + wheezes. No rales. No Rhonchi. Cardiovascular: Normal S1S2. Jenita Deem No murmur. No digit cyanosis. No digit clubbing. No LE edema. GI: Non-tender. +distended. Normal bowel sounds. No masses. No umbilical hernia. Skin: No rash on the exposed extremities. No Nodules on exposed extremities. Neuro: Alert and oriented. Follows commands. Moves all extremities. Psych:  No agitation, no anxiety.     Scheduled Meds:   levoFLOXacin  500 mg Oral Daily    oseltamivir  75 mg Oral BID    ipratropium-albuterol  1 ampule Inhalation Q4H    methylPREDNISolone  40 mg Intravenous Q24H    mupirocin   Nasal BID    chlorhexidine  15 mL Mouth/Throat BID    famotidine (PEPCID) injection  20 mg Intravenous BID    buPROPion  150 mg Oral BID    gabapentin  100 mg Oral TID    PARoxetine  40 mg Oral QPM    sodium chloride flush  10 mL Intravenous 2 times per day    enoxaparin  40 mg Subcutaneous Daily    influenza virus vaccine  0.5 mL Intramuscular Once    nicotine  1 patch Transdermal Daily     PRN Meds:  sodium chloride flush, acetaminophen **OR** acetaminophen, polyethylene glycol, promethazine **OR** ondansetron, albuterol    Results:  CBC:   Recent Labs     03/13/20  0550 03/14/20  0527

## 2020-03-15 NOTE — PROGRESS NOTES
Internal Medicine ICU Progress Note      Events of Last 24 hours:     Patient got more short of breath and was transferred to the ICU. He was initially placed on BiPAP. He continued to be tachypneic. He was then intubated. He became hypotensive and a central line was placed. He was started on Levophed. This morning he is sedated on the ventilator. He is on 1 mcg of Levophed. 3/11-on SBT. Off pressors. Mentation better. 3/12- on SBT. Awake and alert and following commands. 3/13-SBT to get started this morning. Just taken off sedation. His eyes are open but he still somewhat sleepy. 3/14 extubated to [facility]      3/15 feels weak today. Slightly drowsy per staff  Pt awake, watching tv . Invasive Lines: CVC placed on 3/9/2020         MV: Intubated on 3/9/2020    Recent Labs     20  0600 20  1048   PHART 7.356 7.408   QFA7IEH 58.3* 52.6*   PO2ART 89.7 70.5*       MV Settings:  Vent Mode: AC/VC Rate Set: 0 bmp/Vt Ordered: 400 mL/ /FiO2 : 45 %    IV:      Vitals:  Temp  Av.4 °F (36.9 °C)  Min: 98.1 °F (36.7 °C)  Max: 98.6 °F (37 °C)  Pulse  Av.1  Min: 72  Max: 88  BP  Min: 123/78  Max: 161/92  SpO2  Av.9 %  Min: 90 %  Max: 95 %  Patient Vitals for the past 4 hrs:   BP Pulse Resp SpO2   03/15/20 0704 -- -- 16 90 %   03/15/20 0700 132/82 88 15 91 %   03/15/20 0600 126/76 75 19 92 %   03/15/20 0500 130/76 72 15 93 %       CVP:        Intake/Output Summary (Last 24 hours) at 3/15/2020 0070  Last data filed at 3/15/2020 2834  Gross per 24 hour   Intake 600 ml   Output 2655 ml   Net -2055 ml       EXAM:      General:  Middle aged male, Awake, alert and fairly oriented.  Appears to be not in any distress  Mucous Membranes:  Pink , anicteric  Right IJ TLC  Neck: No JVD, no carotid bruit, no thyromegaly  Chest:  Clear to auscultation bilaterally, diminished in bases  Cardiovascular:  RRR S1S2 heard, no murmurs or gallops  Abdomen:  Soft, undistended, non tender, no organomegaly, hypoxic respiratory failure  - sec to CAP and influenza A, copd exacerbation   -He was admitted to the floor. He got worse. He was answered to the ICU. He was put on BiPAP. He got worse. He was intubated and eventually weaned off, now on NC . Wean oxygen as able     Sepsis improved  -Patient became hypotensive. A central line was placed. He was started on Levophed. Most likely source of sepsis is pneumonia. This is improved. His pressure is better.       Community Acquired Pneumonia   - CXR with multifocal airsace disease R >L  - Concerns for gram + organism   - Continue Rocpehin/Azithromycin D#6  He was also started on vancomycin given the severity of his illness. Influenza A   - supportive care, resp isolation  - Tamiflu      COPD AE  - likely 2/2 to above   - Continue IBD, IV Steroids, and IV Abx   - Pulmonology consulted  - Resp Cx: ordered      Hyperglycemia no diabetes  - may be 2/2 to steroids   - Hgb A1c normal at 5.8  - Continue to monitor      Depression  - Continue Paxil and Wellbutrin      OANH  -Patient reports he does not use any home CPAP or BiPAP     Tobacco Abuse   - Recommend cessation   - PRN Nicotine patch/gum      Morbid Obesity  - Body mass index is 47.09 kg/m². - Complicating assessment and treatment.  Placing patient at risk for multiple co-morbidities as well as early death and contributing to the patient's presentation.   - Counseled on weight loss.     DVT Prophylaxis: Lovenox   Diet: carb diet  Code Status: Full Code    Hold sedatives  Lasix x 1 today      Ang Maged 8:12 AM 3/15/2020

## 2020-03-16 LAB
ANION GAP SERPL CALCULATED.3IONS-SCNC: 9 MMOL/L (ref 3–16)
BASOPHILS ABSOLUTE: 0.2 K/UL (ref 0–0.2)
BASOPHILS RELATIVE PERCENT: 1 %
BUN BLDV-MCNC: 27 MG/DL (ref 7–20)
CALCIUM SERPL-MCNC: 8.9 MG/DL (ref 8.3–10.6)
CHLORIDE BLD-SCNC: 95 MMOL/L (ref 99–110)
CO2: 35 MMOL/L (ref 21–32)
CREAT SERPL-MCNC: 0.8 MG/DL (ref 0.9–1.3)
EOSINOPHILS ABSOLUTE: 0.1 K/UL (ref 0–0.6)
EOSINOPHILS RELATIVE PERCENT: 0.5 %
GFR AFRICAN AMERICAN: >60
GFR NON-AFRICAN AMERICAN: >60
GLUCOSE BLD-MCNC: 94 MG/DL (ref 70–99)
HCT VFR BLD CALC: 44.6 % (ref 40.5–52.5)
HEMOGLOBIN: 14.9 G/DL (ref 13.5–17.5)
LYMPHOCYTES ABSOLUTE: 2.1 K/UL (ref 1–5.1)
LYMPHOCYTES RELATIVE PERCENT: 13.9 %
MAGNESIUM: 2.3 MG/DL (ref 1.8–2.4)
MCH RBC QN AUTO: 31.3 PG (ref 26–34)
MCHC RBC AUTO-ENTMCNC: 33.3 G/DL (ref 31–36)
MCV RBC AUTO: 94 FL (ref 80–100)
MONOCYTES ABSOLUTE: 1.3 K/UL (ref 0–1.3)
MONOCYTES RELATIVE PERCENT: 8.6 %
NEUTROPHILS ABSOLUTE: 11.2 K/UL (ref 1.7–7.7)
NEUTROPHILS RELATIVE PERCENT: 76 %
PDW BLD-RTO: 13.7 % (ref 12.4–15.4)
PLATELET # BLD: 227 K/UL (ref 135–450)
PMV BLD AUTO: 8.3 FL (ref 5–10.5)
POTASSIUM REFLEX MAGNESIUM: 3.5 MMOL/L (ref 3.5–5.1)
RBC # BLD: 4.75 M/UL (ref 4.2–5.9)
SODIUM BLD-SCNC: 139 MMOL/L (ref 136–145)
WBC # BLD: 14.8 K/UL (ref 4–11)

## 2020-03-16 PROCEDURE — 2580000003 HC RX 258: Performed by: PHYSICIAN ASSISTANT

## 2020-03-16 PROCEDURE — 6370000000 HC RX 637 (ALT 250 FOR IP): Performed by: INTERNAL MEDICINE

## 2020-03-16 PROCEDURE — 94640 AIRWAY INHALATION TREATMENT: CPT

## 2020-03-16 PROCEDURE — 97530 THERAPEUTIC ACTIVITIES: CPT

## 2020-03-16 PROCEDURE — G0008 ADMIN INFLUENZA VIRUS VAC: HCPCS | Performed by: PHYSICIAN ASSISTANT

## 2020-03-16 PROCEDURE — 2500000003 HC RX 250 WO HCPCS: Performed by: INTERNAL MEDICINE

## 2020-03-16 PROCEDURE — 97535 SELF CARE MNGMENT TRAINING: CPT

## 2020-03-16 PROCEDURE — 6360000002 HC RX W HCPCS: Performed by: PHYSICIAN ASSISTANT

## 2020-03-16 PROCEDURE — 97116 GAIT TRAINING THERAPY: CPT

## 2020-03-16 PROCEDURE — 1200000000 HC SEMI PRIVATE

## 2020-03-16 PROCEDURE — 83735 ASSAY OF MAGNESIUM: CPT

## 2020-03-16 PROCEDURE — 2700000000 HC OXYGEN THERAPY PER DAY

## 2020-03-16 PROCEDURE — 36592 COLLECT BLOOD FROM PICC: CPT

## 2020-03-16 PROCEDURE — 80048 BASIC METABOLIC PNL TOTAL CA: CPT

## 2020-03-16 PROCEDURE — 90686 IIV4 VACC NO PRSV 0.5 ML IM: CPT | Performed by: PHYSICIAN ASSISTANT

## 2020-03-16 PROCEDURE — 94761 N-INVAS EAR/PLS OXIMETRY MLT: CPT

## 2020-03-16 PROCEDURE — 85025 COMPLETE CBC W/AUTO DIFF WBC: CPT

## 2020-03-16 PROCEDURE — 99232 SBSQ HOSP IP/OBS MODERATE 35: CPT | Performed by: INTERNAL MEDICINE

## 2020-03-16 PROCEDURE — 6370000000 HC RX 637 (ALT 250 FOR IP): Performed by: PHYSICIAN ASSISTANT

## 2020-03-16 PROCEDURE — 99233 SBSQ HOSP IP/OBS HIGH 50: CPT | Performed by: INTERNAL MEDICINE

## 2020-03-16 PROCEDURE — 97110 THERAPEUTIC EXERCISES: CPT

## 2020-03-16 PROCEDURE — 2580000003 HC RX 258: Performed by: INTERNAL MEDICINE

## 2020-03-16 RX ORDER — POTASSIUM CHLORIDE 750 MG/1
20 TABLET, EXTENDED RELEASE ORAL ONCE
Status: COMPLETED | OUTPATIENT
Start: 2020-03-16 | End: 2020-03-16

## 2020-03-16 RX ORDER — IPRATROPIUM BROMIDE AND ALBUTEROL SULFATE 2.5; .5 MG/3ML; MG/3ML
1 SOLUTION RESPIRATORY (INHALATION) EVERY 4 HOURS PRN
Status: DISCONTINUED | OUTPATIENT
Start: 2020-03-16 | End: 2020-03-17 | Stop reason: HOSPADM

## 2020-03-16 RX ADMIN — PAROXETINE HYDROCHLORIDE 40 MG: 20 TABLET, FILM COATED ORAL at 16:39

## 2020-03-16 RX ADMIN — LEVOFLOXACIN 500 MG: 500 TABLET, FILM COATED ORAL at 08:26

## 2020-03-16 RX ADMIN — FAMOTIDINE 20 MG: 10 INJECTION INTRAVENOUS at 20:37

## 2020-03-16 RX ADMIN — BUPROPION HYDROCHLORIDE 75 MG: 75 TABLET, FILM COATED ORAL at 20:37

## 2020-03-16 RX ADMIN — ENOXAPARIN SODIUM 40 MG: 40 INJECTION SUBCUTANEOUS at 16:39

## 2020-03-16 RX ADMIN — IPRATROPIUM BROMIDE AND ALBUTEROL SULFATE 1 AMPULE: 2.5; .5 SOLUTION RESPIRATORY (INHALATION) at 03:20

## 2020-03-16 RX ADMIN — GABAPENTIN 100 MG: 100 CAPSULE ORAL at 20:37

## 2020-03-16 RX ADMIN — IPRATROPIUM BROMIDE AND ALBUTEROL SULFATE 1 AMPULE: 2.5; .5 SOLUTION RESPIRATORY (INHALATION) at 07:12

## 2020-03-16 RX ADMIN — Medication 10 ML: at 20:38

## 2020-03-16 RX ADMIN — OSELTAMIVIR PHOSPHATE 75 MG: 75 CAPSULE ORAL at 20:37

## 2020-03-16 RX ADMIN — GABAPENTIN 100 MG: 100 CAPSULE ORAL at 08:27

## 2020-03-16 RX ADMIN — OSELTAMIVIR PHOSPHATE 75 MG: 75 CAPSULE ORAL at 08:36

## 2020-03-16 RX ADMIN — FAMOTIDINE 20 MG: 10 INJECTION INTRAVENOUS at 08:27

## 2020-03-16 RX ADMIN — INFLUENZA A VIRUS A/BRISBANE/02/2018 IVR-190 (H1N1) ANTIGEN (PROPIOLACTONE INACTIVATED), INFLUENZA A VIRUS A/KANSAS/14/2017 X-327 (H3N2) ANTIGEN (PROPIOLACTONE INACTIVATED), INFLUENZA B VIRUS B/MARYLAND/15/2016 ANTIGEN (PROPIOLACTONE INACTIVATED), INFLUENZA B VIRUS B/PHUKET/3073/2013 BVR-1B ANTIGEN (PROPIOLACTONE INACTIVATED) 0.5 ML: 15; 15; 15; 15 INJECTION, SUSPENSION INTRAMUSCULAR at 08:27

## 2020-03-16 RX ADMIN — POTASSIUM CHLORIDE 20 MEQ: 750 TABLET, EXTENDED RELEASE ORAL at 11:03

## 2020-03-16 RX ADMIN — GABAPENTIN 100 MG: 100 CAPSULE ORAL at 14:05

## 2020-03-16 RX ADMIN — PREDNISONE 30 MG: 20 TABLET ORAL at 08:27

## 2020-03-16 RX ADMIN — Medication 10 ML: at 08:28

## 2020-03-16 RX ADMIN — BUPROPION HYDROCHLORIDE 75 MG: 75 TABLET, FILM COATED ORAL at 08:27

## 2020-03-16 RX ADMIN — IPRATROPIUM BROMIDE AND ALBUTEROL SULFATE 1 AMPULE: 2.5; .5 SOLUTION RESPIRATORY (INHALATION) at 11:19

## 2020-03-16 ASSESSMENT — PAIN SCALES - GENERAL
PAINLEVEL_OUTOF10: 0

## 2020-03-16 NOTE — PROGRESS NOTES
4 Eyes Skin Assessment     The patient is being assess for   Shift Handoff    I agree that 2 RN's have performed a thorough Head to Toe Skin Assessment on the patient. ALL assessment sites listed below have been assessed. Areas assessed by both nurses:   [x]   Head, Face, and Ears   [x]   Shoulders, Back, and Chest, Abdomen  [x]   Arms, Elbows, and Hands   [x]   Coccyx, Sacrum, and Ischium  [x]   Legs, Feet, and Heels        No new skin issues noted. **SHARE this note so that the co-signing nurse is able to place an eSignature**    Co-signer eSignature: Electronically signed by Karl Jordan RN on 3/16/20 at 7:50 PM EDT    Does the Patient have Skin Breakdown?   Yes LDA WOUND CARE was Initiated documentation include the Mima-wound, Wound Assessment, Measurements, Dressing Treatment, Drainage, and Color\",          Maxime Prevention initiated:  Yes   Wound Care Orders initiated:  Yes      [id number]  nurse consulted for Pressure Injury (Stage 3,4, Unstageable, DTI, NWPT, Complex wounds)and New or Established Ostomies:  Yes      Primary Nurse eSignature: Electronically signed by Estefany Lockett RN on 3/16/20 at 8:24 PM EDT

## 2020-03-16 NOTE — PLAN OF CARE
Problem: Falls - Risk of:  Goal: Will remain free from falls  Description: Will remain free from falls  3/16/2020 1142 by Sarah Bauer RN  Outcome: Ongoing     Problem: Falls - Risk of:  Goal: Absence of physical injury  Description: Absence of physical injury  3/16/2020 1142 by Sarah Bauer RN  Outcome: Ongoing     Problem: Pain:  Goal: Pain level will decrease  Description: Pain level will decrease  3/16/2020 1142 by Sarah Bauer RN  Outcome: Ongoing     Problem: Pain:  Goal: Control of chronic pain  Description: Control of chronic pain  3/16/2020 1142 by Sarah Bauer RN  Outcome: Ongoing     Problem: Nutrition  Goal: Optimal nutrition therapy  3/16/2020 1142 by Sarah Bauer RN  Outcome: Ongoing     Problem: Nutrition  Goal: Understanding of nutritional guidelines  3/16/2020 1142 by Sarah Bauer RN  Outcome: Ongoing     Problem: Airway Clearance - Ineffective:  Goal: Clear lung sounds  Description: Clear lung sounds  3/16/2020 1142 by Sarah Bauer RN  Outcome: Ongoing     Problem: Airway Clearance - Ineffective:  Goal: Ability to maintain a clear airway will improve  Description: Ability to maintain a clear airway will improve  3/16/2020 1142 by Sarah Bauer RN  Outcome: Ongoing     Problem: Respiratory:  Goal: Respiratory status will improve  Description: Respiratory status will improve  3/16/2020 1142 by Sarah Bauer RN  Outcome: Ongoing

## 2020-03-16 NOTE — PROGRESS NOTES
Internal Medicine ICU Progress Note      Interval history:     Patient got more short of breath and was transferred to the ICU. He was initially placed on BiPAP. He continued to be tachypneic. He was then intubated. He became hypotensive and a central line was placed. He was started on Levophed. This morning he is sedated on the ventilator. He is on 1 mcg of Levophed. 3/11-on SBT. Off pressors. Mentation better. 3/12- on SBT. Awake and alert and following commands. 3/13-SBT to get started this morning. Just taken off sedation. His eyes are open but he still somewhat sleepy. 3/14 extubated to [facility]    3/15 feels weak today. Slightly drowsy per staff  Pt awake, watching tv .     3/16/2020  -Status post mechanical ventilation for acute hypoxic respiratory failure related to influenza and pneumonia. Extubated on 3/14/2020. Oxygen saturations continue to improve and currently stable on 2 L. Weakness is now improved with PT OT. Patient feels much better today      Invasive Lines: CVC placed on 3/9/2020         MV: Intubated on 3/9/2020, 3/14/2020    No results for input(s): PHART, CHM1MDS, PO2ART in the last 72 hours. MV Settings:  Vent Mode: AC/VC Rate Set: 0 bmp/Vt Ordered: 400 mL/ /FiO2 : 45 %    IV:      Vitals:  Temp  Av.3 °F (36.8 °C)  Min: 98 °F (36.7 °C)  Max: 98.5 °F (36.9 °C)  Pulse  Av.6  Min: 69  Max: 81  BP  Min: 108/90  Max: 145/98  SpO2  Av %  Min: 88 %  Max: 97 %  Patient Vitals for the past 4 hrs:   BP Pulse Resp SpO2   20 1201 (!) 140/87 75 21 (!) 88 %       CVP:        Intake/Output Summary (Last 24 hours) at 3/16/2020 1520  Last data filed at 3/16/2020 1420  Gross per 24 hour   Intake 360 ml   Output 2670 ml   Net -2310 ml       EXAM:  General:  Middle aged male, Awake, alert and fairly oriented.  Appears to be not in any distress  Mucous Membranes:  Pink , anicteric  Right IJ TLC  Neck: No JVD, no carotid bruit, no thyromegaly  Chest:  Clear to auscultation bilaterally, diminished in bases. No wheezes rales rhonchi. Cardiovascular:  RRR S1S2 heard, no murmurs or gallops  Abdomen:  Soft, undistended, non tender, no organomegaly, BS present  Extremities: trace edema. Distal pulses well felt  Neurological : grossly normal        Medications:  Scheduled Meds:   predniSONE  30 mg Oral Daily    buPROPion  75 mg Oral BID    gabapentin  100 mg Oral TID    PARoxetine  40 mg Oral QPM    levoFLOXacin  500 mg Oral Daily    oseltamivir  75 mg Oral BID    chlorhexidine  15 mL Mouth/Throat BID    famotidine (PEPCID) injection  20 mg Intravenous BID    sodium chloride flush  10 mL Intravenous 2 times per day    enoxaparin  40 mg Subcutaneous Daily    nicotine  1 patch Transdermal Daily       PRN Meds:  ipratropium-albuterol, sodium chloride flush, acetaminophen **OR** acetaminophen, polyethylene glycol, promethazine **OR** ondansetron    Results:  CBC:   Recent Labs     03/14/20  0527 03/15/20  0515 03/16/20  0535   WBC 14.3* 14.0* 14.8*   HGB 14.3 14.3 14.9   HCT 43.4 43.7 44.6   MCV 95.1 95.0 94.0    223 227     BMP:   Recent Labs     03/14/20  0527 03/15/20  0515 03/16/20  0535    138 139   K 4.2 3.8 3.5   CL 99 96* 95*   CO2 34* 35* 35*   PHOS 3.8  --   --    BUN 27* 23* 27*   CREATININE 0.6* 0.7* 0.8*     LIVER PROFILE: No results for input(s): AST, ALT, LIPASE, BILIDIR, BILITOT, ALKPHOS in the last 72 hours. Invalid input(s): AMYLASE,  ALB  PT/INR: No results for input(s): PROTIME, INR in the last 72 hours. APTT: No results for input(s): APTT in the last 72 hours. UA:  No results for input(s): NITRITE, COLORU, PHUR, LABCAST, WBCUA, RBCUA, MUCUS, TRICHOMONAS, YEAST, BACTERIA, CLARITYU, SPECGRAV, LEUKOCYTESUR, UROBILINOGEN, BILIRUBINUR, BLOODU, GLUCOSEU, AMORPHOUS in the last 72 hours.     Invalid input(s): KETONESU    Cultures:  Respiratory Panel PCR Abnormal  03/10/2020  9:00 AM  - [facility]   Influenza A result is equivical. See additional report for complete Respiratory Pathogens Panel   Recommend recollection if further testing is needed. Organism Abnormal  03/10/2020  9:00 AM [facility]   Adenovirus detected by PCR          Films:  XR ABDOMEN (KUB) (SINGLE AP VIEW)   Final Result   1. The hemidiaphragms are excluded from view. 2. No gas distension loops of small bowel. 3. Transverse colon is mildly distended up to 8 cm. Descending colon is   unremarkable. XR CHEST PORTABLE   Final Result   No significant change from prior examination. Stable multifocal airspace   disease. Support tubes and lines as above. XR CHEST PORTABLE   Final Result   Mild interval improvement in appearance of airspace disease         XR CHEST PORTABLE   Final Result   Multifocal airspace disease, minimally improved in the right upper lobe. XR CHEST PORTABLE   Final Result   Right IJ CVC with tip mid SVC. XR CHEST PORTABLE   Final Result   Interval significant increased bilateral airspace disease since earlier   today, likely pulmonary edema. Interval enlargement of the cardiopericardial silhouette may be at least   partially due to low lung volumes and positioning; it also could be related   to congestive heart failure. Correlation for the possibility of pericardial   effusion is recommended. XR CHEST STANDARD (2 VW)   Final Result   New multifocal right greater than left airspace disease consistent with   developing pneumonia and/or edema. No pleural effusion.                 Assessment:    Principal Problem:    Acute respiratory failure with hypoxia (HCC)  Active Problems:    Depression    Tobacco abuse    OANH (obstructive sleep apnea)    COPD with acute exacerbation (HCC)    PNA (pneumonia)    Pneumonia    Acute respiratory distress    Class 3 severe obesity with body mass index (BMI) of 45.0 to 49.9 in adult [facility])    Pneumonia, primary atypical  Resolved Problems:    * No resolved hospital problems. *         Plan:    Acute hypoxic respiratory failure  - sec to CAP and influenza A, copd exacerbation   -He was admitted to the floor. He got worse. He was transferred to the ICU. He was put on BiPAP. He got worse. He was intubated and eventually weaned off, now on NC . Wean oxygen as able   -S/p mechanical ventilation, oxygen saturations currently stable on 2 L    Sepsis improved  -Patient became hypotensive. A central line was placed. He was started on Levophed. Most likely source of sepsis is pneumonia. This is improved. His pressure is better.  -Off pressors, blood pressure stable, sepsis resolved       Community Acquired Pneumonia   - CXR with multifocal airsace disease R >L  - Concerns for gram + organism   - Continued Rocpehin/Azithromycin . He was also started on vancomycin given the severity of his illness. Antibiotic now switched to Levaquin      Influenza A   - supportive care, resp isolation  -Continue Tamiflu        COPD AE  - likely 2/2 to above   - Continue IBD, IV Steroids, and IV Abx   - Pulmonology consulted  -Taper steroids     Hyperglycemia   no diabetes  - may be 2/2 to steroids   - Hgb A1c normal at 5.8%  - Continue to monitor      Depression  - Continue Paxil and Wellbutrin      OANH  -Patient reports he does not use any home CPAP or BiPAP     Tobacco Abuse   - Recommend cessation   - PRN Nicotine patch/gum      Morbid Obesity  - Body mass index is 47.09 kg/m². - Complicating assessment and treatment. Placing patient at risk for multiple co-morbidities as well as early death and contributing to the patient's presentation.   - Counseled on weight loss.      Debility   - consult PT,OT       DVT Prophylaxis: Lovenox   Diet: carb diet  Code Status: Full Code      He has improved, will transfer out of ICU to [place] [id number] today    Eddye Rule 3:20 PM 3/16/2020

## 2020-03-16 NOTE — PROGRESS NOTES
Inpatient Physical Therapy Daily Treatment Note    Unit: ICU  Date:  3/16/2020  Patient Name:    Darrel Andrews  Admitting diagnosis:  PNA (pneumonia) [J18.9]  Pneumonia [J18.9]  PNA (pneumonia) [J18.9]  Pneumonia [J18.9]  Admit Date:  3/9/2020  Precautions/Restrictions:  fall risk, IV, supplemental O2 (2L), ICU monitoring, contact precautions and droplet precautions      Discharge Recommendations: Home with 24/7 assist and home therapy  DME needs for discharge: Needs Met       Therapy recommendation for EMS Transport: can transport by wheelchair    Therapy recommendations for staff:   Stand by assist with RW for ambulation to/from bathroom    History of Present Illness: 59 y. o. male who presents to the ED for shortness of breath.  Not having chest pain.  Ongoing for almost 4 days.  Was sent by his primary care physician. Saloni Held been on a Medrol Dosepak and doxycycline without improvement.  Feels like prior COPD exacerbations.  Has not seen a pulmonologist in over a year.  No fever or chills.  No nausea or vomiting.  No unilateral leg pain or swelling.   Intubated 3/9/2020  Extubated 3/13/2020     Home Health S4 Level Recommendation: Level 1 Standard  AM-PAC Mobility Score   AM-PAC Inpatient Mobility Raw Score : 21       Treatment Time:  3246-3232  Treatment number: 3  Timed Code Treatment Minutes:   26 minutes  Total Treatment Minutes:  26 minutes    Cognition    A&O x4   Able to follow 2 step commands    Subjective  Patient lying supine in bed with no family present  Pt agreeable to this PT tx, eager to move.     Pain     Location:   Rating:  NA/10  Pain Medicine Status: No request made     Bed Mobility   Supine to Sit:   Modified Independent with use of bed rail  Sit to Supine:  Independent  Rolling:   Not Tested  Scooting:   Independent    Transfer Training     Sit to stand:   Supervision  Stand to sit:   Supervision  Bed to Chair:  Not Tested with use of N/A    Gait  Pt ambulated x 36 ft x 2 bouts with RW and Summary.

## 2020-03-16 NOTE — PROGRESS NOTES
Shift assessment complete. Patient is A&O x4. Denies any pain at this time. On 2L NC.   RR are easy and even. Unlabored. Overall, appears well. Possibly transfer out of ICU? NSR on monitor. VSS. Bowels active x4. Abdomen is soft and non tender. RN to assist patient to [facility] this am.     Trace edema noted. RN and patient discussed smoking cessation at length. He appears very enthusiastic about quitting.      Electronically signed by Tina Quintanilla RN on 3/16/2020 at 8:52 AM    Vitals:    03/16/20 0800   BP: 132/86   Pulse: 72   Resp: 17   Temp: 98.3 °F (36.8 °C)   SpO2: 91%

## 2020-03-16 NOTE — PROGRESS NOTES
Occupational Therapy Daily Treatment Note    Unit: ICU  Date:  3/16/2020  Patient Name:    Nicky Kumar  Admitting diagnosis:  PNA (pneumonia) [J18.9]  Pneumonia [J18.9]  PNA (pneumonia) [J18.9]  Pneumonia [J18.9]  Admit Date:  3/9/2020  Precautions/Restrictions:  fall risk, IV, deluca catheter , supplemental O2 (2 L), contact precautions and droplet precautions      Discharge Recommendations: Home with 24/7 supervision and home therapy  DME needs for discharge: Shower Chair       Therapy recommendations for staff:   Stand by assist for ambulation to bathroom with RW.     AM-PAC Score: 20  Home Health S4 Level: NA       Treatment Time:  10:01-10:37  Treatment number:  3  Total Treatment Time:  36 minutes      Subjective:  Pt reports he wants to go home. Pain   Yes  Rating: mild  Location: back  Pain Medicine Status: No request made      Bed Mobility:   Supine to Sit:  Modified Independent  Sit to Supine:  Modified Independent  Rolling:           Independent   Scooting:        Modified Independent     Transfer Training:   Sit to stand:   Supervision  Stand to sit:  Supervision  Bed to Chair:  Supervision and with use of RW  Bed to [facility]:   Not Tested  Standard toilet:   Not Tested    Completed functional mobility in room with supervision and use of RW. Activity Tolerance   Pt completed therapy session with No nausea, dizziness, pain or SOB noted w/activity  SpO2: >90%   HR: 80s with activity   BP:     ADL Training:   Upper body dressing:  Not Tested  Upper body bathing:  Not Tested  Lower body dressing:  Setup/SBA to don pants   Lower body bathing:  Not Tested  Toileting:   Not Tested  Grooming/Hygiene:  Not Tested    Therapeutic Exercise:   Scapular retraction: x10    Patient Education:   Role of OT  Recommendations for DC    Positioning Needs: In bed, call light and needs in reach. Family Present:  No    Assessment: Pt with significant progress this date.  Patient will be safe to return home with

## 2020-03-16 NOTE — PROGRESS NOTES
Pulmonary Progress Note    CC: hypoxemia    Subjective:   Patient is doing better. Right IJ CVC      Intake/Output Summary (Last 24 hours) at 3/16/2020 0616  Last data filed at 3/16/2020 0600  Gross per 24 hour   Intake --   Output 2575 ml   Net -2575 ml       Exam:   /85   Pulse 72   Temp 98.4 °F (36.9 °C) (Oral)   Resp 16   Ht 5' 5\" (1.651 m)   Wt 277 lb 12.5 oz (126 kg)   SpO2 95%   BMI 46.22 kg/m²  on 3l NC  Gen: No distress. Alert. Normocephalic, atraumatic. Eyes: PERRL. No sclera icterus. No conjunctival injection. ENT: No discharge. Pharynx clear. Neck: Trachea midline. Normal thyroid. Resp: No accessory muscle use. No crackles. No wheezes. No rhonchi. No dullness on percussion. CV: Regular rate. Regular rhythm. No murmur or rub. No edema. GI: Non-tender. Non-distended. No masses. Skin: Warm and dry. No nodule on exposed extremities. No rash on exposed extremities. Lymph: No cervical LAD. No supraclavicular LAD. M/S: No cyanosis. No joint deformity. No clubbing.    Neuro: awake and alert, moving extremities    Scheduled Meds:   predniSONE  30 mg Oral Daily    buPROPion  75 mg Oral BID    gabapentin  100 mg Oral TID    PARoxetine  40 mg Oral QPM    levoFLOXacin  500 mg Oral Daily    oseltamivir  75 mg Oral BID    ipratropium-albuterol  1 ampule Inhalation Q4H    chlorhexidine  15 mL Mouth/Throat BID    famotidine (PEPCID) injection  20 mg Intravenous BID    sodium chloride flush  10 mL Intravenous 2 times per day    enoxaparin  40 mg Subcutaneous Daily    influenza virus vaccine  0.5 mL Intramuscular Once    nicotine  1 patch Transdermal Daily     Continuous Infusions:    PRN Meds:  sodium chloride flush, acetaminophen **OR** acetaminophen, polyethylene glycol, promethazine **OR** ondansetron, albuterol    Labs:  CBC:   Recent Labs     03/14/20  0527 03/15/20  0515 03/16/20  0535   WBC 14.3* 14.0* 14.8*   HGB 14.3 14.3 14.9   HCT 43.4 43.7 44.6   MCV 95.1 95.0 94.0  223 227     BMP:   Recent Labs     03/14/20  0527 03/15/20  0515 03/16/20  0535    138 139   K 4.2 3.8 3.5   CL 99 96* 95*   CO2 34* 35* 35*   PHOS 3.8  --   --    BUN 27* 23* 27*   CREATININE 0.6* 0.7* 0.8*     LIVER PROFILE: No results for input(s): AST, ALT, LIPASE, BILIDIR, BILITOT, ALKPHOS in the last 72 hours. Invalid input(s): AMYLASE,  ALB  PT/INR: No results for input(s): PROTIME, INR in the last 72 hours. APTT: No results for input(s): APTT in the last 72 hours. UA:No results for input(s): NITRITE, COLORU, PHUR, LABCAST, WBCUA, RBCUA, MUCUS, TRICHOMONAS, YEAST, BACTERIA, CLARITYU, SPECGRAV, LEUKOCYTESUR, UROBILINOGEN, BILIRUBINUR, BLOODU, GLUCOSEU, AMORPHOUS in the last 72 hours.     Invalid input(s): Brian Justin  Recent Labs     03/13/20  1048   PHART 7.408   JFL9LFJ 52.6*   PO2ART 70.5*     Cultures:   Rapid flu neg  BAL 3/10 neg  PCR + adenovirus      Films:  CXR 3/13 reviewed by me and showed- mild patchy RLL ASD    Assessment:  · Acute respiratory failure with hypoxemia  · Adenovirus bronchitis/pneumonia  · Community acquired MRSA pneumonia possible  · Influenza a test equivocal  · COPD with acute exacerbation  · OANH not treated  · Morbid obesity  · Tobacco abuse      Plan:  Pred taper  Inhaled bronchodilators   Antibiotics (Levaquin day #3, Tamiflu day number 3/5)  Supplemental oxygen to maintain SaO2 >92%; wean as tolerated  · Sliding-scale insulin  · ICU care: Pepcid, Lovenox subcu  · Bowel regimen  · Ok to leave ICU

## 2020-03-16 NOTE — PLAN OF CARE
Problem: Falls - Risk of:  Goal: Will remain free from falls  Description: Will remain free from falls  Outcome: Ongoing  Goal: Absence of physical injury  Description: Absence of physical injury  Outcome: Ongoing     Problem: Pain:  Description: Pain management should include both nonpharmacologic and pharmacologic interventions.   Goal: Pain level will decrease  Description: Pain level will decrease  Outcome: Ongoing  Goal: Control of acute pain  Description: Control of acute pain  Outcome: Ongoing  Goal: Control of chronic pain  Description: Control of chronic pain  Outcome: Ongoing     Problem: Nutrition  Goal: Optimal nutrition therapy  Outcome: Ongoing  Goal: Understanding of nutritional guidelines  Outcome: Ongoing     Problem: Airway Clearance - Ineffective:  Goal: Clear lung sounds  Description: Clear lung sounds  Outcome: Ongoing  Goal: Ability to maintain a clear airway will improve  Description: Ability to maintain a clear airway will improve  Outcome: Ongoing     Problem: Gas Exchange - Impaired:  Goal: Levels of oxygenation will improve  Description: Levels of oxygenation will improve  Outcome: Ongoing     Problem: Respiratory:  Goal: Respiratory status will improve  Description: Respiratory status will improve  Outcome: Ongoing

## 2020-03-17 VITALS
HEART RATE: 83 BPM | RESPIRATION RATE: 19 BRPM | OXYGEN SATURATION: 92 % | WEIGHT: 261.25 LBS | SYSTOLIC BLOOD PRESSURE: 155 MMHG | BODY MASS INDEX: 43.53 KG/M2 | HEIGHT: 65 IN | DIASTOLIC BLOOD PRESSURE: 86 MMHG | TEMPERATURE: 98 F

## 2020-03-17 PROBLEM — E44.0 MODERATE PROTEIN-CALORIE MALNUTRITION (HCC): Status: ACTIVE | Noted: 2020-03-17

## 2020-03-17 LAB
ANION GAP SERPL CALCULATED.3IONS-SCNC: 10 MMOL/L (ref 3–16)
BASOPHILS ABSOLUTE: 0.1 K/UL (ref 0–0.2)
BASOPHILS RELATIVE PERCENT: 0.7 %
BUN BLDV-MCNC: 26 MG/DL (ref 7–20)
CALCIUM SERPL-MCNC: 9 MG/DL (ref 8.3–10.6)
CHLORIDE BLD-SCNC: 96 MMOL/L (ref 99–110)
CO2: 29 MMOL/L (ref 21–32)
CREAT SERPL-MCNC: 0.8 MG/DL (ref 0.9–1.3)
EOSINOPHILS ABSOLUTE: 0.1 K/UL (ref 0–0.6)
EOSINOPHILS RELATIVE PERCENT: 0.3 %
GFR AFRICAN AMERICAN: >60
GFR NON-AFRICAN AMERICAN: >60
GLUCOSE BLD-MCNC: 96 MG/DL (ref 70–99)
HCT VFR BLD CALC: 45.8 % (ref 40.5–52.5)
HEMOGLOBIN: 15.2 G/DL (ref 13.5–17.5)
LYMPHOCYTES ABSOLUTE: 2.2 K/UL (ref 1–5.1)
LYMPHOCYTES RELATIVE PERCENT: 14.3 %
MAGNESIUM: 2.3 MG/DL (ref 1.8–2.4)
MCH RBC QN AUTO: 31.3 PG (ref 26–34)
MCHC RBC AUTO-ENTMCNC: 33.2 G/DL (ref 31–36)
MCV RBC AUTO: 94.4 FL (ref 80–100)
MONOCYTES ABSOLUTE: 1.3 K/UL (ref 0–1.3)
MONOCYTES RELATIVE PERCENT: 8.5 %
NEUTROPHILS ABSOLUTE: 11.6 K/UL (ref 1.7–7.7)
NEUTROPHILS RELATIVE PERCENT: 76.2 %
PDW BLD-RTO: 13.9 % (ref 12.4–15.4)
PLATELET # BLD: 206 K/UL (ref 135–450)
PMV BLD AUTO: 8.3 FL (ref 5–10.5)
POTASSIUM REFLEX MAGNESIUM: 3.3 MMOL/L (ref 3.5–5.1)
RBC # BLD: 4.86 M/UL (ref 4.2–5.9)
SODIUM BLD-SCNC: 135 MMOL/L (ref 136–145)
WBC # BLD: 15.3 K/UL (ref 4–11)

## 2020-03-17 PROCEDURE — 6370000000 HC RX 637 (ALT 250 FOR IP): Performed by: INTERNAL MEDICINE

## 2020-03-17 PROCEDURE — 80048 BASIC METABOLIC PNL TOTAL CA: CPT

## 2020-03-17 PROCEDURE — 2580000003 HC RX 258: Performed by: INTERNAL MEDICINE

## 2020-03-17 PROCEDURE — 83735 ASSAY OF MAGNESIUM: CPT

## 2020-03-17 PROCEDURE — 99238 HOSP IP/OBS DSCHRG MGMT 30/<: CPT | Performed by: INTERNAL MEDICINE

## 2020-03-17 PROCEDURE — 85025 COMPLETE CBC W/AUTO DIFF WBC: CPT

## 2020-03-17 PROCEDURE — 2500000003 HC RX 250 WO HCPCS: Performed by: INTERNAL MEDICINE

## 2020-03-17 PROCEDURE — 99233 SBSQ HOSP IP/OBS HIGH 50: CPT | Performed by: INTERNAL MEDICINE

## 2020-03-17 PROCEDURE — 97530 THERAPEUTIC ACTIVITIES: CPT

## 2020-03-17 PROCEDURE — 97110 THERAPEUTIC EXERCISES: CPT

## 2020-03-17 RX ORDER — PREDNISONE 10 MG/1
TABLET ORAL
Qty: 18 TABLET | Refills: 0 | Status: SHIPPED | OUTPATIENT
Start: 2020-03-17 | End: 2020-03-23 | Stop reason: ALTCHOICE

## 2020-03-17 RX ORDER — FAMOTIDINE 20 MG/1
20 TABLET, FILM COATED ORAL 2 TIMES DAILY
Qty: 20 TABLET | Refills: 0 | Status: SHIPPED | OUTPATIENT
Start: 2020-03-17 | End: 2020-04-04 | Stop reason: ALTCHOICE

## 2020-03-17 RX ORDER — LEVOFLOXACIN 500 MG/1
500 TABLET, FILM COATED ORAL DAILY
Qty: 1 TABLET | Refills: 0 | Status: SHIPPED | OUTPATIENT
Start: 2020-03-18 | End: 2020-03-19

## 2020-03-17 RX ORDER — POTASSIUM CHLORIDE 750 MG/1
40 TABLET, EXTENDED RELEASE ORAL ONCE
Status: COMPLETED | OUTPATIENT
Start: 2020-03-17 | End: 2020-03-17

## 2020-03-17 RX ADMIN — FAMOTIDINE 20 MG: 10 INJECTION INTRAVENOUS at 08:05

## 2020-03-17 RX ADMIN — POTASSIUM CHLORIDE 40 MEQ: 750 TABLET, EXTENDED RELEASE ORAL at 12:10

## 2020-03-17 RX ADMIN — GABAPENTIN 100 MG: 100 CAPSULE ORAL at 13:51

## 2020-03-17 RX ADMIN — GABAPENTIN 100 MG: 100 CAPSULE ORAL at 08:05

## 2020-03-17 RX ADMIN — Medication 10 ML: at 08:05

## 2020-03-17 RX ADMIN — OSELTAMIVIR PHOSPHATE 75 MG: 75 CAPSULE ORAL at 08:04

## 2020-03-17 RX ADMIN — LEVOFLOXACIN 500 MG: 500 TABLET, FILM COATED ORAL at 08:04

## 2020-03-17 RX ADMIN — BUPROPION HYDROCHLORIDE 75 MG: 75 TABLET, FILM COATED ORAL at 08:05

## 2020-03-17 RX ADMIN — PREDNISONE 30 MG: 20 TABLET ORAL at 08:04

## 2020-03-17 ASSESSMENT — PAIN SCALES - GENERAL
PAINLEVEL_OUTOF10: 0
PAINLEVEL_OUTOF10: 0

## 2020-03-17 NOTE — PROGRESS NOTES
139 135*   K 3.8 3.5 3.3*   CL 96* 95* 96*   CO2 35* 35* 29   BUN 23* 27* 26*   CREATININE 0.7* 0.8* 0.8*     LIVER PROFILE: No results for input(s): AST, ALT, LIPASE, BILIDIR, BILITOT, ALKPHOS in the last 72 hours. Invalid input(s): AMYLASE,  ALB  PT/INR: No results for input(s): PROTIME, INR in the last 72 hours. APTT: No results for input(s): APTT in the last 72 hours. UA:No results for input(s): NITRITE, COLORU, PHUR, LABCAST, WBCUA, RBCUA, MUCUS, TRICHOMONAS, YEAST, BACTERIA, CLARITYU, SPECGRAV, LEUKOCYTESUR, UROBILINOGEN, BILIRUBINUR, BLOODU, GLUCOSEU, AMORPHOUS in the last 72 hours. Invalid input(s): KETONESU  No results for input(s): PHART, PQY5GFB, PO2ART in the last 72 hours.   Cultures:   Rapid flu neg  BAL 3/10 neg  PCR + adenovirus      Films:  CXR 3/13 reviewed by me and showed- mild patchy RLL ASD    Assessment:  · Acute respiratory failure with hypoxemia  · Adenovirus bronchitis/pneumonia  · Community acquired MRSA pneumonia possible  · Influenza a test equivocal  · COPD with acute exacerbation  · OANH not treated  · Morbid obesity  · Tobacco abuse      Plan:  Pred taper  Inhaled bronchodilators   Antibiotics (Levaquin day #4/7, Tamiflu day number 4/5)  Supplemental oxygen to maintain SaO2 >92%; wean as tolerated  · Sliding-scale insulin  · ICU care: Pepcid, Lovenox subcu  · Bowel regimen  · Ok to leave ICU  · Consider discharge later today

## 2020-03-17 NOTE — DISCHARGE INSTR - COC
(shortness of breath) R06.02    Chest pain R07.9    Erectile dysfunction N52.9    Hyperglycemia R73.9    Anxiety F41.9    Depression F32.9    Tobacco abuse Z72.0    History of alcohol abuse F10.11    Fatigue R53.83    OANH (obstructive sleep apnea) G47.33    Hypersomnia G47.10    COPD with acute exacerbation ([facility]) J44.1    Lumbar radiculopathy M54.16    HNP (herniated nucleus pulposus), lumbar M51.26    Spondylosis without myelopathy or radiculopathy, lumbar region M47.816    DDD (degenerative disc disease), lumbar M51.36    Lumbar spine pain M54.5    PNA (pneumonia) J18.9    Pneumonia J18.9    Acute respiratory distress R06.03    Class 3 severe obesity with body mass index (BMI) of 45.0 to 49.9 in adult ([facility]) E66.01, Z68.42    Pneumonia, primary atypical J18.9    Acute respiratory failure with hypoxia ([facility]) J96.01    Influenza A J10.1    Moderate protein-calorie malnutrition ([facility]) E44.0       Isolation/Infection:   Isolation          Contact and Droplet        Patient Infection Status     Infection Onset Added Last Indicated Last Indicated By Review Planned Expiration Resolved Resolved By    Adenovirus pneumonia  03/12/20 03/12/20 Bruce Nowak RN        INFLUENZA  03/12/20 03/12/20 Bruce Nowak RN 03/19/20 04/11/20      Resolved    Adenovirus 03/10/20 03/12/20 03/10/20 Respiratory Panel, Molecular   03/12/20 Bruce Nowak RN          Nurse Assessment:  Last Vital Signs: BP (!) 155/86   Pulse 83   Temp 98 °F (36.7 °C) (Oral)   Resp 19   Ht 5' 5\" (1.651 m)   Wt 261 lb 3.9 oz (118.5 kg)   SpO2 92%   BMI 43.47 kg/m²     Last documented pain score (0-10 scale): Pain Level: 0  Last Weight:   Wt Readings from Last 1 Encounters:   03/17/20 261 lb 3.9 oz (118.5 kg)     Mental Status:  oriented and alert    IV Access:  - None    Nursing Mobility/ADLs:  Walking   Independent  Transfer  Independent  Bathing  Independent  Dressing  Independent  Toileting  Independent  Feeding  Independent  Med Other Treatments After Discharge: none    Physician Certification: I certify the above information and transfer of Cristina Cedillo  is necessary for the continuing treatment of the diagnosis listed and that he requires [address] for less 30 days.      Update Admission H&P: No change in H&P    PHYSICIAN SIGNATURE:  Electronically signed by VO Dr. Petra Craven, RN on 3/17/20 at 2:46 PM EDT

## 2020-03-17 NOTE — PROGRESS NOTES
Discharge instructions reviewed with patient at bedside and also with wife via telephone. Scripts called in to [place]. Deandra Felipe. Patient and wife deny further questions and verbalize understanding.      Electronically signed by Simón Osuna RN on 3/17/2020 at 3:41 PM

## 2020-03-17 NOTE — DISCHARGE SUMMARY
Name:  Quirino Sanders  Room:  3010/3010-01  MRN:    [id number]    Discharge Summary      This discharge summary is in conjunction with a complete physical exam done on the day of discharge. Discharging Physician: Dr. Cindy Garcia: 3/9/2020  Discharge: 3/17/2020     HPI taken from admission H&P:    The patient is a 64 y.o. male with COPD, hx of alcohol abuse, OANH, tobacco abuse who presented to [facility] ED with complaint of SOB. Patient reports that this is been ongoing for a week. He states that he has had significantly increased shortness of breath from his baseline and is been using his inhalers without any improvement. He is a 3 pack/day smoker but is only smoked a couple cigarettes over the last week because of his shortness of breath. He states he has had increased wheezing and a cough with a rattling sensation in his chest but is never able to cough anything up. He states he has soreness across his chest from coughing but denies any other pressure-like chest pain. He denies any fevers at home. He did contact with some sick grandchildren recently. Patient reports that he was diagnosed with a COPD exacerbation and started on medications on Friday but his symptoms just continued to worsen. Diagnoses this Admission and Hospital Course   Acute hypoxic respiratory failure  - sec to CAP and influenza A, copd exacerbation   -He was admitted to the floor and worsened  - He was placed on BiPAP and was transferred to the ICU  - He continued to decline and was intubated and eventually weaned off, now on NC   -S/p mechanical ventilation, oxygen saturations improving, weaned as tolerated, on RA today      Sepsis--not POA   worsening sepsis  due to PNA after admission   -Patient became hypotensive. A central line was placed. He was started on Levophed. - suspect 2/2 pneumonia.     -Off pressors, blood pressure stable  - resolved now     Community Acquired Pneumonia   - CXR with multifocal airsace disease R >L  - Concerns for gram + organism   - Continued Rocpehin/Azithromycin . He was also started on vancomycin given the severity of his illness. - Antibiotic now switched to Levaquin  - D/C home on one more dose of levaquin to complete 10 day course of abx .     Influenza A   - supportive care, resp isolation  -Continue Tamiflu . Completed day 5/5     COPD AE  - likely 2/2 to above   - Continue IBD, IV Steroids, and IV Abx   - Pulmonology consulted  -Taper steroids on DC. Cont IBD     Hyperglycemia   no diabetes  - may be 2/2 to steroids   - Hgb A1c normal at 5.8%  - Monitored     Depression  - Continue Paxil and Wellbutrin      OANH  -Patient reports he does not use any home CPAP or BiPAP     Tobacco Abuse   - Recommend cessation   - PRN Nicotine patch/gum      Morbid Obesity  - Body mass index is 43.47 kg/m². - Complicating assessment and treatment. Placing patient at risk for multiple co-morbidities as well as early death and contributing to the patient's presentation.   - Counseled on weight loss. Debility   - consult PT,OT: home with 24/7 assist and home therapy      Procedures (Please Review Full Report for Details)  Intubation/extubation    Consults    Pulmonology    Physical Exam at Discharge:    BP (!) 155/86   Pulse 83   Temp 98 °F (36.7 °C) (Oral)   Resp 19   Ht 5' 5\" (1.651 m)   Wt 261 lb 3.9 oz (118.5 kg)   SpO2 92%   BMI 43.47 kg/m²   General:  Middle aged male, Awake, alert and fairly oriented. Appears to be not in any distress  Mucous Membranes:  Pink , anicteric  Right IJ TLC  Neck: No JVD, no carotid bruit, no thyromegaly  Chest:  Clear to auscultation bilaterally, diminished in bases. No wheezes rales rhonchi. Cardiovascular:  RRR S1S2 heard, no murmurs or gallops  Abdomen:  Soft, undistended, non tender, no organomegaly, BS present  Extremities: trace edema.  Distal pulses well felt  Neurological : grossly normal    CBC:   Recent Labs     03/15/20  0515 you.            STOP taking these medications    doxycycline hyclate 100 MG tablet  Commonly known as:  VIBRA-TABS     promethazine-dextromethorphan 6.25-15 MG/5ML syrup  Commonly known as:  PROMETHAZINE-DM           Where to Get Your Medications      You can get these medications from any pharmacy    Bring a paper prescription for each of these medications  · famotidine 20 MG tablet  · levoFLOXacin 500 MG tablet  · predniSONE 10 MG tablet       Discharged in stable condition to home with home O2    Follow Up:   Follow up with PCP in 1 week, pulmonology OP         Umesh Torres MD   3/17/2020

## 2020-03-17 NOTE — PLAN OF CARE
Nutrition Problem:  Moderate malnutrition  Intervention: Food and/or Nutrient Delivery: Continue current diet, Start ONS  Nutritional Goals: patient will consume > 75% of his meals on general diet order x 3 meals per day + > 50% of his Ensure high-protein with breakfast and dinner meals

## 2020-03-17 NOTE — CARE COORDINATION
DISCHARGE ORDER  Date/Time 3/17/2020 3:13 PM  Completed by: Toño Delgadillo Case Management    Patient Name: Sasha Officer    : 1960  Admitting Diagnosis: PNA (pneumonia) [J18.9]  Pneumonia [J18.9]  PNA (pneumonia) [J18.9]  Pneumonia [J18.9]      Admit order Date and HWEXBA:  9809    (verify MD's last order for status of admission)      Noted discharge order. Confirmed discharge plan  (Pt): Yes  with whom_______________  If pt confirmed DC plan does family need to be contacted by CM No if yes who______  Discharge Plan: Pt dc'd today. Meets criteria for O2. Wants [facility]. Declined list. Konrad Salgado) notified. Port O2  tank given to nursing. [facility] Liaison updated on dc. ABI and order complete. Reviewed with Pt and nursing. Spouse to transport pt home. Reviewed chart. Role of discharge planner explained and patient verbalized understanding. Discharge order is noted. Has Home O2 in place on admit:  No  Informed of need to bring portable home O2 tank on day of discharge for nursing to connect prior to leaving:   No  Verbalized agreement/Understanding:   No  Pt is being d/c'd to home today with O2 set up. Discharge timeout done with Justyn Zuñigas RN All discharge needs and concerns addressed.
INTERDISCIPLINARY PLAN OF CARE CONFERENCE    Date/Time: 3/11/2020 10:20 AM  Completed by: Mili Rodríguze Case Management      Patient Name:  Ronel Murdock  YOB: 1960  Admitting Diagnosis: PNA (pneumonia) [J18.9]  Pneumonia [J18.9]  PNA (pneumonia) [J18.9]  Pneumonia [J18.9]     Admit Date/Time:  3/9/2020  9:49 AM    Chart reviewed. Interdisciplinary team met to discuss patient progress and discharge plans. Disciplines included Case Management, Nursing, and Dietitian. Current Status:ICU monitoring    PT/OT recommendation:tbd    Anticipated Discharge Date: tbd  Expected D/C Disposition:  Home vrs SNF  Confirmed plan with patient and/or family No  Discharge Plan Comments: Reviewed chart. Pt in ICU on Ventilator. From home with SO. Awaiting PT evaluation when appropriate. Follow.       The Plan for Transition of Care is related to the following treatment goals: home vrs SNF    Home O2 in place on admit: No  Pt informed of need to bring portable home O2 tank on day of discharge for nursing to connect prior to leaving:  Not Indicated  Verbalized agreement/Understanding:  Not Indicated
INTERDISCIPLINARY PLAN OF CARE CONFERENCE    Date/Time: 3/16/2020 9:56 AM  Completed by: Alie De La Fuente Case Management      Patient Name:  Isreal Robles  YOB: 1960  Admitting Diagnosis: PNA (pneumonia) [J18.9]  Pneumonia [J18.9]  PNA (pneumonia) [J18.9]  Pneumonia [J18.9]     Admit Date/Time:  3/9/2020  9:49 AM    Chart reviewed. Interdisciplinary team met to discuss patient progress and discharge plans. Disciplines included Case Management, Nursing, and Dietitian. Current Status:stable    PT/OT recommendation:home with 24/7 assist and home PT/OT    Anticipated Discharge Date: tbd  Expected D/C Disposition:  Home   Confirmed plan with patient and/or family Yes  Discharge Plan Comments: Reviewed chart. Writer spoke with pt's Tessa Karimi, with pt's permission and she would like to be contacted day of d/c r/t POC as she is a nurse. Pt will have 24/7 assistance at home per Cally Azevedo. Pt's SO states no preference on hhc and she chose [facility]. Plan home with SO with [facility] for SN,PT/OT. Will need to f/u with [facility] with [facility] as line is busy at this time. Follow.       The Plan for Transition of Care is related to the following treatment goals: home with hhc        Home O2 in place on admit: No  Pt informed of need to bring portable home O2 tank on day of discharge for nursing to connect prior to leaving:  Not Indicated  Verbalized agreement/Understanding:  Not Indicated
[facility]    Referral received from CM to follow for home care services. I will follow for needs, and speak with patient to verify demos. Patient is in ICU. Will attempt to see patient once stepped down from ICU prior to DC.       Mehrdad Goldberg  Work mobile: [phone]  [facility] office: [phone]
role/services.

## 2020-03-17 NOTE — PROGRESS NOTES
needs in reach. Family Present:  No    Assessment: Patient demonstrates significant improvements with mobility and ADLs with VSS. Patient is safe to return home with PRN assist.     GOALS  To be met in 3 Visits:  1). Bed to BSC: Will tolerate assessment      To be met in 5 Visits:  1). Supine to Sit: Mod A (GOAL MET 3/17) upgrade to independent   2). Upper Body Bathing:  Mod A (GOAL MET 3/17) upgrade to independent   3). Lower Body Bathing:  Mod A (GOAL MET 3/17) upgrade to independent   4). Upper Body Dressing: Mod A (GOAL MET 3/17) upgrade to independent   5). Lower Body Dressing: Mod A (GOAL MET 3/17) upgrade to independent   6).  Pt to abe UE exs x 15 reps      Plan: cont with [facility], OTR/L #[id number]        If patient discharges from this facility prior to next visit, this note will serve as the Discharge Summary

## 2020-03-17 NOTE — PROGRESS NOTES
Patient discharged in stable condition at this time.      Electronically signed by Mo Ayala RN on 3/17/2020 at 4:21 PM

## 2020-03-17 NOTE — PROGRESS NOTES
symptoms:  as evidenced by Diet history of poor intake, Weight loss greater than or equal to 2% in 1 week    Objective Information:  · Nutrition-Focused Physical Findings: patient is in contact and droplet precautions at this time; he consumed 51-75% x 1 meal and % x 1 meal on 3/16/20; + BM on 3/16/20 noted; abdomen is round/rotund, soft, non-tender, and bowel sounds are active; he is A & O x 4  · Wound Type: None  · Current Nutrition Therapies:  · Oral Diet Orders: General   · Oral Diet intake: 51-75%, %  · Oral Nutrition Supplement (ONS) Orders: None  · ONS intake: (none ordered)  · Anthropometric Measures:  · Ht: 5' 5\" (165.1 cm)   · Current Body Wt: 261 lb 4 oz (118.5 kg)(actual; bed scale)  · Admission Body Wt: 283 lb (128.4 kg)(actual; standing scale)  · Usual Body Wt: (260's to 270's, per past weight hx)  · Weight Change:  - 22# or 7.8% weight loss x 8 days admission  · Ideal Body Wt: 136 lb (61.7 kg), % Ideal Body 192%  · BMI Classification: BMI > or equal to 40.0 Obese Class III(43.6)    Nutrition Interventions:   Continue current diet, Start ONS  Continued Inpatient Monitoring, Coordination of Community Care, Coordination of Care    Nutrition Evaluation:   · Evaluation: Goals set   · Goals: patient will consume > 75% of his meals on general diet order x 3 meals per day + > 50% of his Ensure high-protein with breakfast and dinner meals    · Monitoring: Meal Intake, Supplement Intake, Diet Tolerance, Skin Integrity, Weight, Pertinent Labs, Monitor Bowel Function      Electronically signed by Ines Caba RD, LD on 3/17/20 at 2:41 PM EDT    Contact Number: [phone]

## 2020-03-23 ENCOUNTER — OFFICE VISIT (OUTPATIENT)
Dept: FAMILY MEDICINE CLINIC | Age: 60
End: 2020-03-23
Payer: COMMERCIAL

## 2020-03-23 VITALS
OXYGEN SATURATION: 96 % | HEIGHT: 65 IN | TEMPERATURE: 98.1 F | WEIGHT: 271 LBS | BODY MASS INDEX: 45.15 KG/M2 | HEART RATE: 90 BPM | SYSTOLIC BLOOD PRESSURE: 120 MMHG | DIASTOLIC BLOOD PRESSURE: 81 MMHG

## 2020-03-23 PROCEDURE — 1111F DSCHRG MED/CURRENT MED MERGE: CPT | Performed by: NURSE PRACTITIONER

## 2020-03-23 PROCEDURE — 99215 OFFICE O/P EST HI 40 MIN: CPT | Performed by: NURSE PRACTITIONER

## 2020-03-23 NOTE — PROGRESS NOTES
of Breath             albuterol sulfate HFA (PROVENTIL HFA) 108 (90 Base) MCG/ACT inhaler  Inhale 2 puffs into the lungs every 6 hours as needed for Wheezing             buPROPion (WELLBUTRIN SR) 150 MG extended release tablet  Take 1 tablet by mouth 2 times daily             famotidine (PEPCID) 20 MG tablet  Take 1 tablet by mouth 2 times daily             gabapentin (NEURONTIN) 100 MG capsule  Take 1 capsule by mouth 3 times daily for 90 days. Intended supply: 90 days             hydrOXYzine (VISTARIL) 25 MG capsule  Take 1 capsule by mouth 3 times daily as needed for Anxiety             ibuprofen (ADVIL;MOTRIN) 800 MG tablet  Take 1 tablet by mouth 3 times daily             ipratropium (ATROVENT) 0.02 % nebulizer solution  Take 2.5 mLs by nebulization 4 times daily as needed for Wheezing (short of breath)             PARoxetine (PAXIL) 40 MG tablet  TAKE ONE TABLET BY MOUTH EVERY EVENING             sildenafil (REVATIO) 20 MG tablet  Take 1 tablet by mouth daily as needed (erectile dysfunction)             tiotropium (SPIRIVA RESPIMAT) 2.5 MCG/ACT AERS inhaler  Inhale 2 puffs into the lungs daily                   Medications marked \"taking\" at this time  Outpatient Medications Marked as Taking for the 3/23/20 encounter (Office Visit) with ANICETO Sen CNP   Medication Sig Dispense Refill    famotidine (PEPCID) 20 MG tablet Take 1 tablet by mouth 2 times daily 20 tablet 0    PARoxetine (PAXIL) 40 MG tablet TAKE ONE TABLET BY MOUTH EVERY EVENING 30 tablet 1    gabapentin (NEURONTIN) 100 MG capsule Take 1 capsule by mouth 3 times daily for 90 days.  Intended supply: 90 days 90 capsule 2    tiotropium (SPIRIVA RESPIMAT) 2.5 MCG/ACT AERS inhaler Inhale 2 puffs into the lungs daily 1 Inhaler 11    buPROPion (WELLBUTRIN SR) 150 MG extended release tablet Take 1 tablet by mouth 2 times daily 60 tablet 2    albuterol (PROVENTIL) (2.5 MG/3ML) 0.083% nebulizer solution Take 3 mLs by nebulization every 6 hours as needed for Wheezing or Shortness of Breath 25 vial 1    ipratropium (ATROVENT) 0.02 % nebulizer solution Take 2.5 mLs by nebulization 4 times daily as needed for Wheezing (short of breath) 2.5 mL 1    hydrOXYzine (VISTARIL) 25 MG capsule Take 1 capsule by mouth 3 times daily as needed for Anxiety 60 capsule 1    albuterol sulfate HFA (PROVENTIL HFA) 108 (90 Base) MCG/ACT inhaler Inhale 2 puffs into the lungs every 6 hours as needed for Wheezing 1 Inhaler 0        Medications patient taking as of now reconciled against medications ordered at time of hospital discharge: Yes    Chief Complaint   Patient presents with    Follow-Up from [address] [place] [address]     discharged 3/17/20 from [facility]       History of Present illness - Follow up of Hospital diagnosis(es): Acute respiratory failure with hypoxia, sepsis, community-acquired pneumonia, influenza A, COPD exacerbation, tobacco use, obstructive sleep apnea, hyperglycemia and debility    Inpatient course: Discharge summary reviewed- see chart. Interval history/Current status:   Overall the patient states he is doing well. Completed Tamiflu while he was in the hospital  The patient states he has finished his prednisone however he continues on Levaquin as prescribed. He was to continue with inhaled bronchodilators  The patient denies cough, chest pain, dyspnea, wheezing or hemoptysis. He denies any  dizziness/lightheadedness, fever, chills, nausea, vomiting or diarrhea. The patient states that he has not smoked any tobacco since he has left the hospital.  He has no desire at this time to return to smoking. Patient is already on Wellbutrin for his mood. The patient has not scheduled with pulmonology. The patient was discharged on O2 2 L per nasal cannula as needed however the patient states he has not had to use it.   He is not currently on CPAP or BiPAP at home    A comprehensive review of systems was negative except for what was noted in the HPI.    Vitals:    03/23/20 1101   BP: 120/81   Site: Left Upper Arm   Position: Sitting   Cuff Size: Large Adult   Pulse: 90   Temp: 98.1 °F (36.7 °C)   TempSrc: Oral   SpO2: 96%   Weight: 271 lb (122.9 kg)   Height: 5' 5\" (1.651 m)     Body mass index is 45.1 kg/m². Wt Readings from Last 3 Encounters:   03/23/20 271 lb (122.9 kg)   03/17/20 261 lb 3.9 oz (118.5 kg)   03/09/20 284 lb (128.8 kg)     BP Readings from Last 3 Encounters:   03/23/20 120/81   03/17/20 (!) 155/86   03/09/20 130/76        Physical Exam:  General Appearance: alert and oriented to person, place and time, well developed and well- nourished, in no acute distress  Skin: warm and dry, no rash or erythema  Head: normocephalic and atraumatic  Eyes: pupils equal, round, and reactive to light, extraocular eye movements intact, conjunctivae normal  ENT: tympanic membrane, external ear and ear canal normal bilaterally, nose without deformity, nasal mucosa and turbinates normal without polyps  Neck: supple and non-tender without mass, no thyromegaly or thyroid nodules, no cervical lymphadenopathy  Pulmonary/Chest: clear to auscultation bilaterally- no wheezes, rales or rhonchi, normal air movement, no respiratory distress  Cardiovascular: normal rate, regular rhythm, normal S1 and S2, no murmurs, rubs, clicks, or gallops, distal pulses intact, no carotid bruits  Abdomen: soft, non-tender, non-distended, normal bowel sounds, no masses or organomegaly  Extremities: no cyanosis, clubbing or edema  Musculoskeletal: normal range of motion, no joint swelling, deformity or tenderness  Neurologic: reflexes normal and symmetric, no cranial nerve deficit, gait, coordination and speech normal    Assessment/Plan:  1. Acute respiratory failure with hypoxia (HCC)  Status post mechanical ventilation. Patient has O2 at 2 L per nasal cannula as needed for O2 sats below 90%.   Patient states that he has not needed any oxygen since discharge  - MD DISCHARGE MEDS RECONCILED W/ CURRENT OUTPATIENT MED LIST    2. COPD with acute exacerbation (HCC)  Completed prednisone  Continue inhaled bronchodilators  Will need to follow-up/initial consult with pulmonology as an outpatient for management of COPD and OANH  - [place] DISCHARGE MEDS RECONCILED W/ CURRENT OUTPATIENT MED LIST    3. Influenza A  Completed Tamiflu while in hospital  - DE DISCHARGE MEDS RECONCILED W/ CURRENT OUTPATIENT MED LIST    4. OANH (obstructive sleep apnea)  Not currently on CPAP or BiPAP  Will need to follow-up/initial consult with pulmonology as an outpatient for management of COPD and OANH  - DE DISCHARGE MEDS RECONCILED W/ CURRENT OUTPATIENT MED LIST    5. Pneumonia, primary atypical  Continue Levaquin until completed  Unable to do repeat chest x-ray at this time related to only essential testing is being done related to [facility] MED LIST    6. Hyperglycemia  Hemoglobin A1c was 5.8 in the hospital.  Will need to monitor every 6 to 12 months  Lab Results   Component Value Date    LABA1C 5.8 03/09/2020     Lab Results   Component Value Date    .8 03/09/2020       - DE DISCHARGE MEDS RECONCILED W/ CURRENT OUTPATIENT MED LIST    7. Tobacco abuse  Patient is currently on Wellbutrin for his mood however this can also help with his continued cessation from tobacco.  Encouraged patient to call if he is struggling or has any questions for additional support  - DE DISCHARGE MEDS RECONCILED W/ CURRENT OUTPATIENT MED LIST    8. Debility  Insurance would not pay for his physical therapy/occupational therapy at home. The patient has been doing home exercises to increase his endurance and his bilateral lower extremity weakness. Overall the patient states that both of these issues are improving  - DE DISCHARGE MEDS RECONCILED W/ CURRENT OUTPATIENT MED LIST    9.  Hospital discharge follow-up  Extensively reviewed and discussed recent in patient hospital records, labs, imaging and consults with patient, discussed HPI and Plan, coordinated care and patient counseling provided at today's visit  - [place] DISCHARGE MEDS RECONCILED W/ CURRENT OUTPATIENT MED LIST        Medical Decision Making: high complexity

## 2020-04-04 ENCOUNTER — NURSE TRIAGE (OUTPATIENT)
Dept: OTHER | Facility: CLINIC | Age: 60
End: 2020-04-04

## 2020-04-04 ENCOUNTER — HOSPITAL ENCOUNTER (EMERGENCY)
Age: 60
Discharge: HOME | End: 2020-04-04
Attending: EMERGENCY MEDICINE
Payer: COMMERCIAL

## 2020-04-04 ENCOUNTER — APPOINTMENT (OUTPATIENT)
Dept: CT IMAGING | Age: 60
End: 2020-04-04
Payer: COMMERCIAL

## 2020-04-04 VITALS
DIASTOLIC BLOOD PRESSURE: 89 MMHG | SYSTOLIC BLOOD PRESSURE: 143 MMHG | OXYGEN SATURATION: 97 % | WEIGHT: 263 LBS | RESPIRATION RATE: 18 BRPM | HEART RATE: 84 BPM | TEMPERATURE: 98.4 F | BODY MASS INDEX: 44.9 KG/M2 | HEIGHT: 64 IN

## 2020-04-04 LAB
A/G RATIO: 1 (ref 1.1–2.2)
ALBUMIN SERPL-MCNC: 3.1 G/DL (ref 3.4–5)
ALP BLD-CCNC: 61 U/L (ref 40–129)
ALT SERPL-CCNC: 35 U/L (ref 10–40)
ANION GAP SERPL CALCULATED.3IONS-SCNC: 10 MMOL/L (ref 3–16)
AST SERPL-CCNC: 25 U/L (ref 15–37)
BANDED NEUTROPHILS RELATIVE PERCENT: 4 % (ref 0–7)
BASOPHILS ABSOLUTE: 0.1 K/UL (ref 0–0.2)
BASOPHILS RELATIVE PERCENT: 1 %
BILIRUB SERPL-MCNC: 0.7 MG/DL (ref 0–1)
BUN BLDV-MCNC: 10 MG/DL (ref 7–20)
CALCIUM SERPL-MCNC: 8.3 MG/DL (ref 8.3–10.6)
CHLORIDE BLD-SCNC: 101 MMOL/L (ref 99–110)
CO2: 26 MMOL/L (ref 21–32)
CREAT SERPL-MCNC: 0.8 MG/DL (ref 0.9–1.3)
EOSINOPHILS ABSOLUTE: 0.2 K/UL (ref 0–0.6)
EOSINOPHILS RELATIVE PERCENT: 3 %
GFR AFRICAN AMERICAN: >60
GFR NON-AFRICAN AMERICAN: >60
GLOBULIN: 3 G/DL
GLUCOSE BLD-MCNC: 127 MG/DL (ref 70–99)
HCT VFR BLD CALC: 42.5 % (ref 40.5–52.5)
HEMOGLOBIN: 14.5 G/DL (ref 13.5–17.5)
LYMPHOCYTES ABSOLUTE: 1 K/UL (ref 1–5.1)
LYMPHOCYTES RELATIVE PERCENT: 19 %
MCH RBC QN AUTO: 31.6 PG (ref 26–34)
MCHC RBC AUTO-ENTMCNC: 34.1 G/DL (ref 31–36)
MCV RBC AUTO: 92.8 FL (ref 80–100)
MONOCYTES ABSOLUTE: 0.5 K/UL (ref 0–1.3)
MONOCYTES RELATIVE PERCENT: 10 %
NEUTROPHILS ABSOLUTE: 3.4 K/UL (ref 1.7–7.7)
NEUTROPHILS RELATIVE PERCENT: 63 %
PDW BLD-RTO: 13.7 % (ref 12.4–15.4)
PLATELET # BLD: 135 K/UL (ref 135–450)
PMV BLD AUTO: 7.6 FL (ref 5–10.5)
POTASSIUM REFLEX MAGNESIUM: 3.6 MMOL/L (ref 3.5–5.1)
PRO-BNP: 209 PG/ML (ref 0–124)
RBC # BLD: 4.58 M/UL (ref 4.2–5.9)
SODIUM BLD-SCNC: 137 MMOL/L (ref 136–145)
TOTAL PROTEIN: 6.1 G/DL (ref 6.4–8.2)
TROPONIN: <0.01 NG/ML
WBC # BLD: 5.1 K/UL (ref 4–11)

## 2020-04-04 PROCEDURE — 84484 ASSAY OF TROPONIN QUANT: CPT

## 2020-04-04 PROCEDURE — 96375 TX/PRO/DX INJ NEW DRUG ADDON: CPT

## 2020-04-04 PROCEDURE — 6360000002 HC RX W HCPCS: Performed by: EMERGENCY MEDICINE

## 2020-04-04 PROCEDURE — 99285 EMERGENCY DEPT VISIT HI MDM: CPT

## 2020-04-04 PROCEDURE — 6360000004 HC RX CONTRAST MEDICATION: Performed by: EMERGENCY MEDICINE

## 2020-04-04 PROCEDURE — 80053 COMPREHEN METABOLIC PANEL: CPT

## 2020-04-04 PROCEDURE — 96374 THER/PROPH/DIAG INJ IV PUSH: CPT

## 2020-04-04 PROCEDURE — 6360000002 HC RX W HCPCS

## 2020-04-04 PROCEDURE — 36415 COLL VENOUS BLD VENIPUNCTURE: CPT

## 2020-04-04 PROCEDURE — 93005 ELECTROCARDIOGRAM TRACING: CPT | Performed by: EMERGENCY MEDICINE

## 2020-04-04 PROCEDURE — 71260 CT THORAX DX C+: CPT

## 2020-04-04 PROCEDURE — 85025 COMPLETE CBC W/AUTO DIFF WBC: CPT

## 2020-04-04 PROCEDURE — 83880 ASSAY OF NATRIURETIC PEPTIDE: CPT

## 2020-04-04 RX ORDER — FUROSEMIDE 40 MG/1
40 TABLET ORAL DAILY
Qty: 5 TABLET | Refills: 0 | Status: SHIPPED | OUTPATIENT
Start: 2020-04-04 | End: 2020-08-19

## 2020-04-04 RX ORDER — ONDANSETRON 2 MG/ML
INJECTION INTRAMUSCULAR; INTRAVENOUS
Status: COMPLETED
Start: 2020-04-04 | End: 2020-04-04

## 2020-04-04 RX ORDER — FUROSEMIDE 10 MG/ML
40 INJECTION INTRAMUSCULAR; INTRAVENOUS ONCE
Status: COMPLETED | OUTPATIENT
Start: 2020-04-04 | End: 2020-04-04

## 2020-04-04 RX ORDER — ONDANSETRON 2 MG/ML
4 INJECTION INTRAMUSCULAR; INTRAVENOUS ONCE
Status: COMPLETED | OUTPATIENT
Start: 2020-04-04 | End: 2020-04-04

## 2020-04-04 RX ADMIN — ONDANSETRON 4 MG: 2 INJECTION INTRAMUSCULAR; INTRAVENOUS at 12:18

## 2020-04-04 RX ADMIN — FUROSEMIDE 40 MG: 10 INJECTION, SOLUTION INTRAMUSCULAR; INTRAVENOUS at 12:44

## 2020-04-04 RX ADMIN — IOPAMIDOL 75 ML: 755 INJECTION, SOLUTION INTRAVENOUS at 11:56

## 2020-04-04 RX ADMIN — ONDANSETRON HYDROCHLORIDE 4 MG: 2 INJECTION, SOLUTION INTRAMUSCULAR; INTRAVENOUS at 12:18

## 2020-04-04 ASSESSMENT — ENCOUNTER SYMPTOMS
NAUSEA: 0
SORE THROAT: 0
ABDOMINAL PAIN: 0
VOMITING: 0
DIARRHEA: 0
EYE DISCHARGE: 0
SHORTNESS OF BREATH: 1
BACK PAIN: 0
COUGH: 0

## 2020-04-04 ASSESSMENT — PAIN DESCRIPTION - PAIN TYPE: TYPE: ACUTE PAIN

## 2020-04-04 ASSESSMENT — PAIN SCALES - GENERAL: PAINLEVEL_OUTOF10: 3

## 2020-04-04 ASSESSMENT — PAIN DESCRIPTION - LOCATION: LOCATION: CHEST

## 2020-04-04 NOTE — ED PROVIDER NOTES
[facility]        Pt Name: Андрей East  MRN: [id number]  [place] 1960  Date of evaluation: 4/4/2020  Provider: Fernando Bender MD  PCP: ANICETO Wheeler - CNP  ED Attending: Fernando Bender MD    [address] [place] [address]       Chief Complaint   Patient presents with    Shortness of Breath     reports increased sob since tuesday and dizziness. dlilon was admitted 3 weeks ago to icu and was intubated for 5 days. had flu a. pt ambulated to bed 3. no resp distress. sats 97% ra. was placed on 2 l per nc at home. reports has been using. reports he is building up on water and making his sob. no pitting edema in ext. HISTORY OF PRESENT ILLNESS   (Location/Symptom, Timing/Onset, Context/Setting, Quality, Duration, Modifying Factors, Severity)  Note limiting factors. Андрей East is a 64 y.o. male who presents to the emergency department with increasing dyspnea over the last 2 to 3 days. Patient states his weight has remained the same. He feels as if his hands face and legs are swelling. Patient was recently admitted to the hospital and actually briefly intubated. He says he has not smoked since leaving. He finished all of his antibiotics. He was at the time influenza A positive and completed Tamiflu. Patient does have some exertional dyspnea as well as orthopnea. He denies any fevers or chills. No chest pain. No GI symptoms. He denies any myalgias or arthralgias. Patient was instructed to come in for evaluation by primary care. History is obtained from the patient, review of previous chart. REVIEW OF SYSTEMS    (2-9 systems for level 4, 10 or more for level 5)     Review of Systems   Constitutional: Negative for chills and fever. HENT: Negative for congestion and sore throat. Eyes: Negative for discharge. Respiratory: Positive for shortness of breath. Negative for cough.     Cardiovascular: Positive for leg swelling. Negative for chest pain. Gastrointestinal: Negative for abdominal pain, diarrhea, nausea and vomiting. Endocrine: Negative for polydipsia. Genitourinary: Negative for dysuria and urgency. Musculoskeletal: Negative for back pain and myalgias. Skin: Negative for rash. Neurological: Negative for weakness and headaches. Hematological: Does not bruise/bleed easily. Psychiatric/Behavioral: Negative for confusion. Positives and Pertinent negatives as per HPI. Except as noted above in the ROS, all other systems were reviewed and negative.        PAST MEDICAL HISTORY     Past Medical History:   Diagnosis Date    Anxiety 1/6/2020    Chronic obstructive pulmonary disease ([facility] [address] 9/3/2019    PFT done 9/03/19    Crush injury of right foot 7/23/2015    DDD (degenerative disc disease), lumbar 1/6/2020    Erectile dysfunction 1/6/2020    Fatigue 1/6/2020    History of alcohol abuse 1/6/2020    History of drug use     HNP (herniated nucleus pulposus), lumbar 1/6/2020    Hyperglycemia 1/6/2020    Hypersomnia 1/6/2020    Kidney stone     Lumbar radiculopathy 1/6/2020    Lumbar spine pain 1/6/2020    Observed sleep apnea 1/6/2020    Reactive depression 1/6/2020    Spondylosis without myelopathy or radiculopathy, lumbar region 1/6/2020    Tobacco use 1/6/2020         SURGICAL HISTORY     Past Surgical History:   Procedure Laterality Date    CHOLECYSTECTOMY      CYSTOSCOPY  03/16/2011    cystoscopy left ureteroscopy, left retrograde, double J stent placement    PAIN MANAGEMENT PROCEDURE Right 12/24/2019    RIGHT LUMBAR FIVE SACRAL ONE TRANSFORAMINAL EPIDURAL STEROID INJECTION SITE CONFIRMED BY FLUOROSCOPY performed by Martinez Coronel MD at [address] [place] [address] Right 1/7/2020    RIGHT LUMBAR FOUR AND LUMBAR FIVE TRANSFORAMINAL EPIDURAL STEROID INJECTION SITE CONFIRMED BY FLUOROSCOPY performed by Martinez Coronel MD at [place] [address] Procedures    CRITICAL CARE TIME   N/A    CONSULTS:  None      EMERGENCY DEPARTMENT COURSE and DIFFERENTIAL DIAGNOSIS/MDM:   Vitals:    Vitals:    04/04/20 1043 04/04/20 1143 04/04/20 1225 04/04/20 1256   BP: 124/84 (!) 154/85 (!) 154/87 (!) 143/89   Pulse: 93 84 81 84   Resp: 20 18 18 18   Temp: 98.4 °F (36.9 °C)      TempSrc: Oral      SpO2: 98% 99% 97% 97%   Weight: 263 lb (119.3 kg)      Height: 5' 4\" (1.626 m)          Patient was given the following medications:  Medications   iopamidol (ISOVUE-370) 76 % injection 75 mL (75 mLs Intravenous Given 4/4/20 1156)   ondansetron (ZOFRAN) injection 4 mg (4 mg Intravenous Given 4/4/20 1218)   furosemide (LASIX) injection 40 mg (40 mg Intravenous Given 4/4/20 1244)     Patient is at risk of pulmonary embolism. CT angiogram was obtained that does not show any acute abnormalities however he does have 2 groundglass opacity lesions consistent with his resolving pneumonia. Patient's blood work does reveal a mildly elevated BNP. At this point I think he is probably still has some volume overload. I am going to diurese him. I want him to follow-up closely with primary care. Patient had echocardiogram as well as cardiac catheterization almost a year ago. I do not think we need to admit the patient for further work-up. Patient is agreeable with this plan. I estimate there is LOW risk for ACUTE CORONARY SYNDROME, CHRONIC OBSTRUCTIVE PULMONARY DISEASE, CONGESTIVE HEART FAILURE, PERICARDIAL TAMPONADE, PNEUMONIA, PNEUMOTHORAX, PULMONARY EMBOLISM, SEPSIS, and THORACIC DISSECTION,  thus I consider the discharge disposition reasonable. Franklin Leung and I have discussed the diagnosis and risks, and we agree with discharging home to follow-up with their primary doctor. We also discussed returning to the Emergency Department immediately if new or worsening symptoms occur.  We have discussed the symptoms which are most concerning (e.g., bloody sputum, fever, worsening pain

## 2020-04-05 LAB
EKG ATRIAL RATE: 88 BPM
EKG DIAGNOSIS: NORMAL
EKG P AXIS: 43 DEGREES
EKG P-R INTERVAL: 174 MS
EKG Q-T INTERVAL: 338 MS
EKG QRS DURATION: 74 MS
EKG QTC CALCULATION (BAZETT): 408 MS
EKG R AXIS: -81 DEGREES
EKG T AXIS: 57 DEGREES
EKG VENTRICULAR RATE: 88 BPM

## 2020-04-05 PROCEDURE — 93010 ELECTROCARDIOGRAM REPORT: CPT | Performed by: INTERNAL MEDICINE

## 2020-04-06 ENCOUNTER — CARE COORDINATION (OUTPATIENT)
Dept: CARE COORDINATION | Age: 60
End: 2020-04-06

## 2020-04-06 ENCOUNTER — TELEPHONE (OUTPATIENT)
Dept: OTHER | Facility: CLINIC | Age: 60
End: 2020-04-06

## 2020-04-06 NOTE — TELEPHONE ENCOUNTER
RN access attempted to contact ANICETO Clarke office on multiple occasions. Attempts were unsuccessful.

## 2020-04-06 NOTE — CARE COORDINATION
Pt provided emotional support regarding his anxiety and concerns. Pt expressed he had a complete cardiac work up last year and \"everything was normal\". Pt stated he was encouraged to stay on a fluids restriction of 1500ml daily and no more than 1.5G of sodium. Will f/u with pt in next 3 days to f/u with any further questions or concerns. Pt is aware office has seen his message and will be reviewing it. Pt voiced appreciation.

## 2020-04-09 ENCOUNTER — VIRTUAL VISIT (OUTPATIENT)
Dept: FAMILY MEDICINE CLINIC | Age: 60
End: 2020-04-09
Payer: COMMERCIAL

## 2020-04-09 ENCOUNTER — CARE COORDINATION (OUTPATIENT)
Dept: CARE COORDINATION | Age: 60
End: 2020-04-09

## 2020-04-09 PROCEDURE — 99215 OFFICE O/P EST HI 40 MIN: CPT | Performed by: NURSE PRACTITIONER

## 2020-04-09 RX ORDER — BUSPIRONE HYDROCHLORIDE 10 MG/1
10 TABLET ORAL 3 TIMES DAILY PRN
Qty: 45 TABLET | Refills: 0 | Status: SHIPPED | OUTPATIENT
Start: 2020-04-09 | End: 2020-05-09

## 2020-04-09 RX ORDER — FUROSEMIDE 20 MG/1
20 TABLET ORAL EVERY MORNING
Qty: 30 TABLET | Refills: 1 | Status: SHIPPED | OUTPATIENT
Start: 2020-04-09 | End: 2020-09-09

## 2020-04-09 RX ORDER — POTASSIUM CHLORIDE 750 MG/1
10 TABLET, FILM COATED, EXTENDED RELEASE ORAL DAILY
Qty: 30 TABLET | Refills: 1 | Status: SHIPPED | OUTPATIENT
Start: 2020-04-09 | End: 2020-07-14

## 2020-04-09 ASSESSMENT — ENCOUNTER SYMPTOMS
GASTROINTESTINAL NEGATIVE: 1
ALLERGIC/IMMUNOLOGIC NEGATIVE: 1
SHORTNESS OF BREATH: 1
CHEST TIGHTNESS: 0
WHEEZING: 0
EYES NEGATIVE: 1

## 2020-04-09 NOTE — PROGRESS NOTES
different coronavirus but he was not checked for COVID-19. His respiratory panel did show positive for adenovirus. He has COPD and has not scheduled f/u with pulmo      4/4/20 ER visit labs/imaging results  Negative troponin  Elevated BNP at 209  Normal CBC  Lab Results   Component Value Date     04/04/2020    K 3.6 04/04/2020     04/04/2020    CO2 26 04/04/2020    BUN 10 04/04/2020    CREATININE 0.8 (L) 04/04/2020    GLUCOSE 127 (H) 04/04/2020    CALCIUM 8.3 04/04/2020    PROT 6.1 (L) 04/04/2020    LABALBU 3.1 (L) 04/04/2020    BILITOT 0.7 04/04/2020    ALKPHOS 61 04/04/2020    AST 25 04/04/2020    ALT 35 04/04/2020    LABGLOM >60 04/04/2020    GFRAA >60 04/04/2020    AGRATIO 1.0 (L) 04/04/2020    GLOB 3.0 04/04/2020     CTA pulmonary result as follows      Impression   No pulmonary embolus.       Small region of ground-glass attenuation peripherally in the right upper   lobe.  Additional 2 cm ground-glass nodule in the right lower lobe. Recommend follow-up in 3 months. Mood Disorder:  Patient presents for follow-up of depression and anxiety disorder. Current complaints include: agitation and severe anxiety . He denies any other symptoms. Symptoms/signs of lev: none. External stressors: illness or family illness. Current treatment includes: none. The patient quit taking his Paxil because he felt it did not help him. He also quit taking Wellbutrin. The patient has tried vistaril as needed for anxiety without effectiveness. He has never tried BuSpar. The patient states that he tried his sister's Xanax and had extreme relief. He is wanting a prescription for Klonopin or Xanax if possible.     PHQ-9  1/6/2020 9/25/2019 9/11/2019   Little interest or pleasure in doing things 3 3 3   Feeling down, depressed, or hopeless 2 2 2   Trouble falling or staying asleep, or sleeping too much 3 2 3   Feeling tired or having little energy 2 3 3   Poor appetite or overeating 3 2 3   Feeling bad about yourself - or that you are a failure or have let yourself or your family down 0 1 1   Trouble concentrating on things, such as reading the newspaper or watching television 2 1 1   Moving or speaking so slowly that other people could have noticed. Or the opposite - being so fidgety or restless that you have been moving around a lot more than usual 2 3 1   Thoughts that you would be better off dead, or of hurting yourself in some way 0 0 1   PHQ-2 Score 5 5 5   PHQ-9 Total Score 17 17 18   If you checked off any problems, how difficult have these problems made it for you to do your work, take care of things at home, or get along with other people? 2 2 1     Interpretation of Total Score Total Score Depression Severity: 1-4 = Minimal depression, 5-9 = Mild depression, 10-14 = Moderate depression, 15-19 = Moderately severe depression, 20-27 = Severe depression           Review of Systems   Constitutional: Negative. HENT: Negative. Eyes: Negative. Respiratory: Positive for shortness of breath. Negative for chest tightness and wheezing. Cardiovascular: Positive for leg swelling. Negative for chest pain and palpitations. Gastrointestinal: Negative. Endocrine: Negative. Genitourinary: Negative. Musculoskeletal: Negative. Skin: Negative. Allergic/Immunologic: Negative. Neurological: Negative. Hematological: Negative. Psychiatric/Behavioral: Positive for agitation, dysphoric mood and sleep disturbance. Negative for self-injury and suicidal ideas. The patient is nervous/anxious. All other systems reviewed and are negative. Prior to Visit Medications    Medication Sig Taking?  Authorizing Provider   OXYGEN Inhale 2 L into the lungs as needed  Historical Provider, MD   Oxygen Concentrator 2 L as needed  Historical Provider, MD   furosemide (LASIX) 40 MG tablet Take 1 tablet by mouth daily for 5 days  Silvana Marcus MD   PARoxetine (PAXIL) 40 MG tablet TAKE ONE TABLET BY MOUTH drug use     HNP (herniated nucleus pulposus), lumbar 1/6/2020    Hyperglycemia 1/6/2020    Hypersomnia 1/6/2020    Kidney stone     Lumbar radiculopathy 1/6/2020    Lumbar spine pain 1/6/2020    Observed sleep apnea 1/6/2020    Reactive depression 1/6/2020    Spondylosis without myelopathy or radiculopathy, lumbar region 1/6/2020    Tobacco use 1/6/2020   ,   Past Surgical History:   Procedure Laterality Date    CHOLECYSTECTOMY      CYSTOSCOPY  03/16/2011    cystoscopy left ureteroscopy, left retrograde, double J stent placement    PAIN MANAGEMENT PROCEDURE Right 12/24/2019    RIGHT LUMBAR FIVE SACRAL ONE TRANSFORAMINAL EPIDURAL STEROID INJECTION SITE CONFIRMED BY FLUOROSCOPY performed by Farheen Frazier MD at [facility] Right 1/7/2020    RIGHT LUMBAR FOUR AND LUMBAR FIVE TRANSFORAMINAL EPIDURAL STEROID INJECTION SITE CONFIRMED BY FLUOROSCOPY performed by Farheen Frazier MD at [place] [address]   ,   Social History     Tobacco Use    Smoking status: Current Every Day Smoker     Packs/day: 2.00     Years: 35.00     Pack years: 70.00     Start date: 1974    Smokeless tobacco: Never Used   Substance Use Topics    Alcohol use: No    Drug use: Not Currently     Comment: hx of drug use   ,   Family History   Problem Relation Age of Onset    Heart Disease Mother     Other Mother         copd    Liver Disease Father     High Blood Pressure Sister     No Known Problems Sister    ,   Immunization History   Administered Date(s) Administered    Influenza, Quadv, IM, PF (6 mo and older Fluzone, Flulaval, Fluarix, and 3 yrs and older Afluria) 03/16/2020    Tdap (Boostrix, Adacel) 07/19/2015   ,   Health Maintenance   Topic Date Due    Hepatitis C screen  1960    HIV screen  08/09/1977    Lipid screen  08/09/2002    Colon cancer screen colonoscopy  08/09/2012    Pneumococcal 0-64 years Vaccine (1 of 1 - PPSV23) 09/16/2020 (Originally 8/9/1968)    Shingles observable physical exam findings-     ASSESSMENT/PLAN:  1. Acute congestive heart failure, unspecified heart failure type (HCC)  BNP was slightly elevated at ER  Continue for now  - furosemide (LASIX) 20 MG tablet; Take 1 tablet by mouth every morning  Dispense: 30 tablet; Refill: 1  - potassium chloride (KLOR-CON) 10 MEQ extended release tablet; Take 1 tablet by mouth daily  Dispense: 30 tablet; Refill: 1    2. Shortness of breath  Improving   Recommend pulmo consult  Continue with smoking cessation     3. Edema due to hypoalbuminemia  Continue lasix at 20 mg daily once 40 mg complete  I believe the patient's edema is related to his low protein and albumin levels and is unlikely he will need to stay on Lasix long-term  - Comprehensive Metabolic Panel; Future    4. Hypoalbuminemia  See note above  - Comprehensive Metabolic Panel; Future    5. Proteins serum plasma low  See note above  - Comprehensive Metabolic Panel; Future    6. Anxiety  The patient took a family member Xanax and stated it helped significantly. He also stated he used Klonopin in the past with good results. I educated the patient that it is our policy not to provide controlled substances to patients at this time. Educated patient that if he would like this type of medication he would need to have psychiatric referral and patient declined. Will do a trial of the following  - busPIRone (BUSPAR) 10 MG tablet; Take 1 tablet by mouth 3 times daily as needed (anxiety)  Dispense: 45 tablet; Refill: 0    7. Reactive depression  The patient quit taking the Paxil for both depression and anxiety and felt like not helping. The patient declined any other medication trials    8. Pulmonary nodule, right  Will need follow-up CT of the chest in 3 months  - Low Dose Chest CT-Abnormal Lung Screen Follow up; Future      Return in about 3 months (around 7/9/2020) for Zac Dolye.     Neal Valdivia is a 64 y.o. male being evaluated by a Virtual Visit (video visit) encounter to address concerns as mentioned above. A caregiver was present when appropriate. Due to this being a TeleHealth encounter (During TDTAT-65 public health emergency), evaluation of the following organ systems was limited: Vitals/Constitutional/EENT/Resp/CV/GI//MS/Neuro/Skin/Heme-Lymph-Imm. Pursuant to the emergency declaration under the [address] [place] [address] and the [employer] Act, this Virtual Visit was conducted with patient's (and/or legal guardian's) consent, to reduce the patient's risk of exposure to COVID-19 and provide necessary medical care. The patient (and/or legal guardian) has also been advised to contact this office for worsening conditions or problems, and seek emergency medical treatment and/or call 911 if deemed necessary. Services were provided through a video synchronous discussion virtually to substitute for in-person clinic visit. Patient and provider were located at their individual homes. --ANICETO Luna - CNP on 4/9/2020 at 12:28 PM    An electronic signature was used to authenticate this note.

## 2020-04-09 NOTE — PROGRESS NOTES
Neal Valdivia is a 64 y.o. male evaluated via telephone on 4/9/2020.       Consent:  He and/or health care decision maker is aware that that he may receive a bill for this telephone service, depending on his insurance coverage, and has provided verbal consent to proceed: Yes      Meera Ortiz

## 2020-04-09 NOTE — CARE COORDINATION
F/u call to reach pt to ensure he was able to get an appt (in person or VV) see below. Pt voiced he has a list of questions to go over regarding his depression/anxiety. ACM encouraged him to utilize his time with his PCP ot go over questions or conerns. Pt voiced understanidng. COVID-19 Screening Follow-up Note    Patient contacted regarding COVID-19 risk and screening. Care Transition Nurse/ Ambulatory Care Manager contacted the patient by telephone to perform follow-up assessment. Verified name and  with patient as identifiers. Patient has following risk factors of: SOB, COPD and no known risk factors        Symptoms reviewed with patient who verbalized the following symptoms: anxiety/depression, shortness of breath and no new/worsening symptoms       Due to Pt has VV with PCP today to go over questions or concerns encounter was not routed to provider for escalation. Education provided regarding infection prevention, and signs and symptoms of COVID-19 and when to seek medical attention with patient who verbalized understanding. Discussed exposure protocols and quarantine from [address] you at higher risk for severe illness  and given an opportunity for questions and concerns. The patient agrees to contact the COVID-19 hotline [phone] or PCP office for questions related to their healthcare. CTN/ACM provided contact information for future reference. From CDC: Are you at higher risk for severe illness?  Wash your hands often.  Avoid close contact (6 feet, which is about two arm lengths) with people who are sick.  Put distance between yourself and other people if COVID-19 is spreading in your community.  Clean and disinfect frequently touched surfaces.  Avoid all cruise travel and non-essential air travel.  Call your healthcare professional if you have concerns about COVID-19 and your underlying condition or if you are sick.     For more information on steps you can

## 2020-04-13 ENCOUNTER — CARE COORDINATION (OUTPATIENT)
Dept: CARE COORDINATION | Age: 60
End: 2020-04-13

## 2020-04-13 NOTE — CARE COORDINATION
have concerns about COVID-19 and your underlying condition or if you are sick.     For more information on steps you can take to protect yourself, see CDC's How to Jerry for follow-up call in 5-7 days based on severity of symptoms and risk factors    Delgado ZHU, RN Care Manager  [phone] [facility] [facility],  [address] [place] [address] Primary Care

## 2020-04-20 ENCOUNTER — CARE COORDINATION (OUTPATIENT)
Dept: CARE COORDINATION | Age: 60
End: 2020-04-20

## 2020-04-20 ENCOUNTER — TELEPHONE (OUTPATIENT)
Dept: FAMILY MEDICINE CLINIC | Age: 60
End: 2020-04-20

## 2020-04-20 NOTE — CARE COORDINATION
You Patient resolved from the Care Transitions episode on 4/4/2020  Patient/family has been provided the following resources and education related to COVID-19:                         Signs, symptoms and red flags related to COVID-19            CDC exposure and quarantine guidelines            Conduit exposure contact - [phone]            Contact for their local Department of Health                 Patient currently reports that the following symptoms have improved:  cough, shortness of breath and no new/worsening symptoms     No further outreach scheduled with this CTN/ACM. Episode of Care resolved. Patient has this CTN/ACM contact information if future needs arise.     Rose Marie LINCOLNN, RN Care Manager  [phone] [address] [facility],  [address] [place] [address] Primary Care

## 2020-04-20 NOTE — CARE COORDINATION
2 week f/u call attempted. Unable to reach pt. Message left to inform pt no further outreach and to f/u with his PCP with urgent questions or concerns. No further outreach at this time.      FU appts/Provider:    Future Appointments   Date Time Provider Riri Jara   6/25/2020 11:00 AM Rae Luevano, APRN - CNP [facility]       Asif LINCOLNN, RN Care Manager  [phone] [facility],  [address] [place] [address] Primary Care

## 2020-04-20 NOTE — CARE COORDINATION
Mailed pt COPD folder. Pt voiced appreciation for education due to pt voicing he was interested in breathing techniques.

## 2020-05-27 ENCOUNTER — VIRTUAL VISIT (OUTPATIENT)
Dept: FAMILY MEDICINE CLINIC | Age: 60
End: 2020-05-27
Payer: COMMERCIAL

## 2020-05-27 PROCEDURE — 99443 PR PHYS/QHP TELEPHONE EVALUATION 21-30 MIN: CPT | Performed by: NURSE PRACTITIONER

## 2020-05-27 RX ORDER — VENLAFAXINE HYDROCHLORIDE 75 MG/1
75 CAPSULE, EXTENDED RELEASE ORAL DAILY
Qty: 30 CAPSULE | Refills: 1 | Status: SHIPPED | OUTPATIENT
Start: 2020-05-27 | End: 2020-08-07 | Stop reason: SDUPTHER

## 2020-05-27 NOTE — PROGRESS NOTES
Natacha Mei is a 64 y.o. male evaluated via telephone on 5/27/2020. Consent:  He and/or health care decision maker is aware that that he may receive a bill for this telephone service, depending on his insurance coverage, and has provided verbal consent to proceed: Yes      [address] [place] [id number]  Dept: [phone]  Dept Fax: [phone]  Loc: [phone]    Natacha Mei is a 64 y.o. male who presents today for his medical conditions/complaints as noted below. Natacha Mei is c/o of Shortness of Breath (pt said he was getting better and then all of sudden he started going down hill ); Fatigue; and Extremity Weakness (in both legs )       Subjective:     Chief Complaint   Patient presents with    Shortness of Breath     pt said he was getting better and then all of sudden he started going down hill     Fatigue    Extremity Weakness     in both legs        HPI  The patient has had increasing dyspnea since 3/2020 and went to ER on 4/4/20 for the same issue and found to have BLE. Patient had mildly elevated BNP and diagnosed with acute CHF and given lasix x 5 days. Patient had echocardiogram as well as cardiac catheterization almost a year ago. Patient was seen on 4/9/20 with same complaints he has today with dyspnea, generalized weakness and fatigue. He is still not smoking  Was referred to pulmo but has not gone  He states he has \"a serious issue\" with strength in BLE and is falling when getting up out of bed in the middle of the night   Per patient he trips over his own feet every time he tries to walk  States he was offered  to go to rehabilitation center after hospital discharge for  2 to 4 weeks however he declined and then went home. 4/9/20 offered PT referral but was unable to schedule due to COVID closure.       Patient states his weight is fluctuating and states he will \"gain 20 pounds over night then loose 20 pounds the next night\"  The patient also had low protein contributing to his edema. He states he has increased his protein intake,  Milk, yogurt, ensure. 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      + fatigue--Patient states he is sleeping 16-18 hours a day. Has increased depression and anxiety. Loosing his temper  Lexapro, paxil and wellbutrin tried  He quit taking medication  4/9/20 prescribed buspar but unsure if he filled rx  Was requesting xanax  Denies SI/HI     States he just \"hasn't been right\"  Had severe head injury about 3-4 years ago and fell 30 feet and had amnesia for several days afterward. Feels he hasn't been the same since. /92 at home the other day per patient report      Past Medical History:   Diagnosis Date    Anxiety 1/6/2020    Chronic obstructive pulmonary disease ([facility] [address] 9/3/2019    PFT done 9/03/19    Crush injury of right foot 7/23/2015    DDD (degenerative disc disease), lumbar 1/6/2020    Erectile dysfunction 1/6/2020    Fatigue 1/6/2020    History of alcohol abuse 1/6/2020    History of drug use     HNP (herniated nucleus pulposus), lumbar 1/6/2020    Hyperglycemia 1/6/2020    Hypersomnia 1/6/2020    Kidney stone     Lumbar radiculopathy 1/6/2020    Lumbar spine pain 1/6/2020    Observed sleep apnea 1/6/2020    Reactive depression 1/6/2020    Spondylosis without myelopathy or radiculopathy, lumbar region 1/6/2020    Tobacco use 1/6/2020         Review of Systems   Constitutional: Positive for appetite change and fatigue. Negative for unexpected weight change. HENT: Negative. Eyes: Negative. Respiratory: Positive for shortness of breath. Negative for chest tightness and wheezing. Cardiovascular: Positive for leg swelling.  Negative ([place]) 04/04/2020 408     P Axis 04/04/2020 43     R Axis 04/04/2020 -81     T Axis 04/04/2020 57     Diagnosis 04/04/2020 Normal sinus rhythmLeft axis deviationLow voltage QRSInferior infarct (cited on or before 17-MAY-2019)Cannot rule out Anterior infarct , age undeterminedAbnormal ECGWhen compared with ECG of 14-MAR-2020 02:45,Minimal criteria for Anterior infarct are now Present  vs lead placementConfirmed by [facility] MD, [id number] ([id number]) on 4/5/2020 11:44:00 AM     WBC 04/04/2020 5.1     RBC 04/04/2020 4.58     Hemoglobin 04/04/2020 14.5     Hematocrit 04/04/2020 42.5     MCV 04/04/2020 92.8     MCH 04/04/2020 31.6     MCHC 04/04/2020 34.1     RDW 04/04/2020 13.7     Platelets 13/57/6680 135     MPV 04/04/2020 7.6     Neutrophils % 04/04/2020 63.0     Lymphocytes % 04/04/2020 19.0     Monocytes % 04/04/2020 10.0     Eosinophils % 04/04/2020 3.0     Basophils % 04/04/2020 1.0     Neutrophils Absolute 04/04/2020 3.4     Lymphocytes Absolute 04/04/2020 1.0     Monocytes Absolute 04/04/2020 0.5     Eosinophils Absolute 04/04/2020 0.2     Basophils Absolute 04/04/2020 0.1     Bands Relative 04/04/2020 4     Sodium 04/04/2020 137     Potassium reflex Magnesi* 04/04/2020 3.6     Chloride 04/04/2020 101     CO2 04/04/2020 26     Anion Gap 04/04/2020 10     Glucose 04/04/2020 127*    BUN 04/04/2020 10     CREATININE 04/04/2020 0.8*    GFR Non- 04/04/2020 >60     GFR  04/04/2020 >60     Calcium 04/04/2020 8.3     Total Protein 04/04/2020 6.1*    Alb 04/04/2020 3.1*    Albumin/Globulin Ratio 04/04/2020 1.0*    Total Bilirubin 04/04/2020 0.7     Alkaline Phosphatase 04/04/2020 61     ALT 04/04/2020 35     AST 04/04/2020 25     Globulin 04/04/2020 3.0     Troponin 04/04/2020 <0.01     Pro-BNP 04/04/2020 209*       No results found for this visit on 05/27/20. Assessment:       1. Edema, unspecified type    2.  Shortness of breath    3. Bilateral understanding. Chronic obstructive pulmonary disease, unspecified COPD type ([facility] [address]  Needs pulmo f/u    Acute congestive heart failure, unspecified heart failure type [facility])   Patient had echocardiogram as well as cardiac catheterization almost a year ago. Recommend f/u with cardio d/t new CHF s/s  May consider repeat echo    Proteins serum plasma low  Recheck cmp    Edema due to hypoalbuminemia  -     TSH with Reflex; Future  -     Magnesium; Future    History of traumatic head injury  Consider MRI brain, neuro consult    Patient has been instructed call the office immediately with new symptoms, change in symptoms or worseningof symptoms. If this is not feasible, patient is instructed to report to the emergency room. Medication profile reviewed. Medication side effects and possible impairments from medications were discussed as applicable. Allergies were reviewed. Health maintenance was reviewed and updated as appropriate. No follow-ups on file. (Comment: Please note this report has been produced using a combination of typing and speech recognition software and may contain errors related to that system including errors in grammar, punctuation, and spelling, as well as words and phrases that may be inappropriate. If there are any questions or concerns please feel free to contact the dictating provider for clarification.)      Documentation:  I communicated with the patient and/or health care decision maker about above conditions. Details of this discussion including any medical advice provided: as noted above      I affirm this is a Patient Initiated Episode with a Patient who has not had a related appointment within my department in the past 7 days or scheduled within the next 24 hours.     Patient identification was verified at the start of the visit: Yes    Total Time: minutes: 21-30 minutes    Note: not billable if this call serves to triage the patient into an appointment for the relevant concern      Josh Day

## 2020-05-28 ASSESSMENT — ENCOUNTER SYMPTOMS
SHORTNESS OF BREATH: 1
ALLERGIC/IMMUNOLOGIC NEGATIVE: 1
WHEEZING: 0
CHEST TIGHTNESS: 0
GASTROINTESTINAL NEGATIVE: 1
EYES NEGATIVE: 1

## 2020-05-29 ENCOUNTER — HOSPITAL ENCOUNTER (OUTPATIENT)
Age: 60
Discharge: HOME | End: 2020-05-29
Payer: COMMERCIAL

## 2020-05-29 ENCOUNTER — HOSPITAL ENCOUNTER (OUTPATIENT)
Dept: GENERAL RADIOLOGY | Age: 60
Discharge: HOME | End: 2020-05-29
Payer: COMMERCIAL

## 2020-05-29 LAB
A/G RATIO: 1.1 (ref 1.1–2.2)
ALBUMIN SERPL-MCNC: 3.7 G/DL (ref 3.4–5)
ALP BLD-CCNC: 67 U/L (ref 40–129)
ALT SERPL-CCNC: 20 U/L (ref 10–40)
ANION GAP SERPL CALCULATED.3IONS-SCNC: 8 MMOL/L (ref 3–16)
AST SERPL-CCNC: 21 U/L (ref 15–37)
BASOPHILS ABSOLUTE: 0 K/UL (ref 0–0.2)
BASOPHILS RELATIVE PERCENT: 0.6 %
BILIRUB SERPL-MCNC: 0.6 MG/DL (ref 0–1)
BUN BLDV-MCNC: 16 MG/DL (ref 7–20)
CALCIUM SERPL-MCNC: 8.9 MG/DL (ref 8.3–10.6)
CHLORIDE BLD-SCNC: 102 MMOL/L (ref 99–110)
CO2: 28 MMOL/L (ref 21–32)
CREAT SERPL-MCNC: 0.8 MG/DL (ref 0.9–1.3)
EOSINOPHILS ABSOLUTE: 0.2 K/UL (ref 0–0.6)
EOSINOPHILS RELATIVE PERCENT: 2.7 %
GFR AFRICAN AMERICAN: >60
GFR NON-AFRICAN AMERICAN: >60
GLOBULIN: 3.5 G/DL
GLUCOSE BLD-MCNC: 134 MG/DL (ref 70–99)
HCT VFR BLD CALC: 50.4 % (ref 40.5–52.5)
HEMOGLOBIN: 16.8 G/DL (ref 13.5–17.5)
LYMPHOCYTES ABSOLUTE: 2.6 K/UL (ref 1–5.1)
LYMPHOCYTES RELATIVE PERCENT: 35.4 %
MAGNESIUM: 2.2 MG/DL (ref 1.8–2.4)
MCH RBC QN AUTO: 30.7 PG (ref 26–34)
MCHC RBC AUTO-ENTMCNC: 33.3 G/DL (ref 31–36)
MCV RBC AUTO: 92.1 FL (ref 80–100)
MONOCYTES ABSOLUTE: 0.7 K/UL (ref 0–1.3)
MONOCYTES RELATIVE PERCENT: 9.6 %
NEUTROPHILS ABSOLUTE: 3.8 K/UL (ref 1.7–7.7)
NEUTROPHILS RELATIVE PERCENT: 51.7 %
PDW BLD-RTO: 13.9 % (ref 12.4–15.4)
PLATELET # BLD: 184 K/UL (ref 135–450)
PMV BLD AUTO: 8.1 FL (ref 5–10.5)
POTASSIUM SERPL-SCNC: 4.8 MMOL/L (ref 3.5–5.1)
RBC # BLD: 5.48 M/UL (ref 4.2–5.9)
SODIUM BLD-SCNC: 138 MMOL/L (ref 136–145)
TOTAL PROTEIN: 7.2 G/DL (ref 6.4–8.2)
TSH REFLEX: 0.48 UIU/ML (ref 0.27–4.2)
WBC # BLD: 7.3 K/UL (ref 4–11)

## 2020-05-29 PROCEDURE — 85025 COMPLETE CBC W/AUTO DIFF WBC: CPT

## 2020-05-29 PROCEDURE — 71046 X-RAY EXAM CHEST 2 VIEWS: CPT

## 2020-05-29 PROCEDURE — 83735 ASSAY OF MAGNESIUM: CPT

## 2020-05-29 PROCEDURE — 84443 ASSAY THYROID STIM HORMONE: CPT

## 2020-05-29 PROCEDURE — 80053 COMPREHEN METABOLIC PANEL: CPT

## 2020-05-29 PROCEDURE — 36415 COLL VENOUS BLD VENIPUNCTURE: CPT

## 2020-06-26 ENCOUNTER — OFFICE VISIT (OUTPATIENT)
Dept: FAMILY MEDICINE CLINIC | Age: 60
End: 2020-06-26
Payer: COMMERCIAL

## 2020-06-26 VITALS
SYSTOLIC BLOOD PRESSURE: 138 MMHG | TEMPERATURE: 98.1 F | WEIGHT: 282 LBS | DIASTOLIC BLOOD PRESSURE: 80 MMHG | OXYGEN SATURATION: 94 % | HEART RATE: 88 BPM | HEIGHT: 64 IN | BODY MASS INDEX: 48.14 KG/M2

## 2020-06-26 PROCEDURE — G0444 DEPRESSION SCREEN ANNUAL: HCPCS | Performed by: NURSE PRACTITIONER

## 2020-06-26 PROCEDURE — 99215 OFFICE O/P EST HI 40 MIN: CPT | Performed by: NURSE PRACTITIONER

## 2020-06-26 PROCEDURE — G8431 POS CLIN DEPRES SCRN F/U DOC: HCPCS | Performed by: NURSE PRACTITIONER

## 2020-06-26 RX ORDER — TRAZODONE HYDROCHLORIDE 50 MG/1
50 TABLET ORAL NIGHTLY PRN
Qty: 30 TABLET | Refills: 1 | Status: SHIPPED | OUTPATIENT
Start: 2020-06-26 | End: 2020-08-07 | Stop reason: SDUPTHER

## 2020-06-26 RX ORDER — ONDANSETRON 4 MG/1
4 TABLET, FILM COATED ORAL DAILY PRN
Qty: 30 TABLET | Refills: 0 | Status: SHIPPED | OUTPATIENT
Start: 2020-06-26 | End: 2020-08-07 | Stop reason: SDUPTHER

## 2020-06-26 RX ORDER — IBUPROFEN 800 MG/1
800 TABLET ORAL 3 TIMES DAILY
Qty: 90 TABLET | Refills: 0 | Status: SHIPPED | OUTPATIENT
Start: 2020-06-26 | End: 2020-08-07 | Stop reason: SDUPTHER

## 2020-06-26 ASSESSMENT — PATIENT HEALTH QUESTIONNAIRE - PHQ9
5. POOR APPETITE OR OVEREATING: 0
9. THOUGHTS THAT YOU WOULD BE BETTER OFF DEAD, OR OF HURTING YOURSELF: 0
8. MOVING OR SPEAKING SO SLOWLY THAT OTHER PEOPLE COULD HAVE NOTICED. OR THE OPPOSITE, BEING SO FIGETY OR RESTLESS THAT YOU HAVE BEEN MOVING AROUND A LOT MORE THAN USUAL: 3
2. FEELING DOWN, DEPRESSED OR HOPELESS: 1
1. LITTLE INTEREST OR PLEASURE IN DOING THINGS: 3
3. TROUBLE FALLING OR STAYING ASLEEP: 3
7. TROUBLE CONCENTRATING ON THINGS, SUCH AS READING THE NEWSPAPER OR WATCHING TELEVISION: 0
SUM OF ALL RESPONSES TO PHQ QUESTIONS 1-9: 13
4. FEELING TIRED OR HAVING LITTLE ENERGY: 3
10. IF YOU CHECKED OFF ANY PROBLEMS, HOW DIFFICULT HAVE THESE PROBLEMS MADE IT FOR YOU TO DO YOUR WORK, TAKE CARE OF THINGS AT HOME, OR GET ALONG WITH OTHER PEOPLE: 1
SUM OF ALL RESPONSES TO PHQ QUESTIONS 1-9: 13
SUM OF ALL RESPONSES TO PHQ9 QUESTIONS 1 & 2: 4
6. FEELING BAD ABOUT YOURSELF - OR THAT YOU ARE A FAILURE OR HAVE LET YOURSELF OR YOUR FAMILY DOWN: 0

## 2020-06-26 NOTE — PROGRESS NOTES
before bedtime, avoiding vigorous physical exercise prior to bed, decreasing caffeine consumption, over the counter diphenhydramine, going to sleep at the same time each night and melatonin use. Associated symptoms include: anxiety and frequent nighttime urination. Patient denies daytime somnolence, fatigue, irritability, leg cramps, restless legs and snoring. Symptoms have gradually worsened. Coughing up a lot of mucous to the point he throws up. Feels like zofran helps and requesting  ibuprofen 800 mg helps      Patient complains of lower urinary tract symptoms. He reports frequency, nocturia more than five times a night, urgency and dribbling and difficulty starting . He denies incomplete emptying, straining and weak stream. Patient states symptoms are of moderate severity. Onset of symptoms was 1 month ago and was gradual in onset. He has no personal history of prostate cancer. He reports a history of kidney stones. He denies flank pain, gross hematuria and recurrent UTI. Mood Disorder:  Patient presents for follow-up of depression and anxiety disorder. Current complaints include: See PHQ9 below . He denies any other symptoms. Symptoms/signs of lev: none. External stressors: nothing new. Current treatment includes: Effexor- xr 75 mg daily. Medication side effects: none. PHQ-9  6/26/2020 1/6/2020 9/25/2019 9/11/2019   Little interest or pleasure in doing things 3 3 3 3   Feeling down, depressed, or hopeless 1 2 2 2   Trouble falling or staying asleep, or sleeping too much 3 3 2 3   Feeling tired or having little energy 3 2 3 3   Poor appetite or overeating 0 3 2 3   Feeling bad about yourself - or that you are a failure or have let yourself or your family down 0 0 1 1   Trouble concentrating on things, such as reading the newspaper or watching television 0 2 1 1   Moving or speaking so slowly that other people could have noticed.  Or the opposite - being so fidgety or restless that you have been moving around a lot more than usual 3 2 3 1   Thoughts that you would be better off dead, or of hurting yourself in some way 0 0 0 1   PHQ-2 Score 4 5 5 5   PHQ-9 Total Score 13 17 17 18   If you checked off any problems, how difficult have these problems made it for you to do your work, take care of things at home, or get along with other people? 1 2 2 1     Interpretation of Total Score Total Score Depression Severity: 1-4 = Minimal depression, 5-9 = Mild depression, 10-14 = Moderate depression, 15-19 = Moderately severe depression, 20-27 = Severe depression        Past Medical History:   Diagnosis Date    Anxiety 1/6/2020    Chronic obstructive pulmonary disease ([facility] [address] 9/3/2019    PFT done 9/03/19    Crush injury of right foot 7/23/2015    DDD (degenerative disc disease), lumbar 1/6/2020    Erectile dysfunction 1/6/2020    Fatigue 1/6/2020    History of alcohol abuse 1/6/2020    History of drug use     HNP (herniated nucleus pulposus), lumbar 1/6/2020    Hyperglycemia 1/6/2020    Hypersomnia 1/6/2020    Kidney stone     Lumbar radiculopathy 1/6/2020    Lumbar spine pain 1/6/2020    Observed sleep apnea 1/6/2020    Reactive depression 1/6/2020    Spondylosis without myelopathy or radiculopathy, lumbar region 1/6/2020    Tobacco use 1/6/2020         Review of Systems   Constitutional: Positive for fatigue. Negative for appetite change and unexpected weight change. HENT: Negative. Eyes: Negative. Respiratory: Positive for cough and shortness of breath. Negative for chest tightness and wheezing. Cardiovascular: Positive for leg swelling. Negative for chest pain and palpitations. Gastrointestinal: Negative. Endocrine: Negative. Genitourinary: Negative. Musculoskeletal: Negative. Negative for neck pain and neck stiffness. Anterior neck muscle cramps   Skin: Negative. Allergic/Immunologic: Negative. Neurological: Negative. Hematological: Negative. Bilirubin 05/29/2020 0.6     Alkaline Phosphatase 05/29/2020 67     ALT 05/29/2020 20     AST 05/29/2020 21     Globulin 05/29/2020 3.5     WBC 05/29/2020 7.3     RBC 05/29/2020 5.48     Hemoglobin 05/29/2020 16.8     Hematocrit 05/29/2020 50.4     MCV 05/29/2020 92.1     MCH 05/29/2020 30.7     MCHC 05/29/2020 33.3     RDW 05/29/2020 13.9     Platelets 26/58/7476 184     MPV 05/29/2020 8.1     Neutrophils % 05/29/2020 51.7     Lymphocytes % 05/29/2020 35.4     Monocytes % 05/29/2020 9.6     Eosinophils % 05/29/2020 2.7     Basophils % 05/29/2020 0.6     Neutrophils Absolute 05/29/2020 3.8     Lymphocytes Absolute 05/29/2020 2.6     Monocytes Absolute 05/29/2020 0.7     Eosinophils Absolute 05/29/2020 0.2     Basophils Absolute 05/29/2020 0.0        No results found for this visit on 06/26/20. Assessment:       1. Chronic obstructive pulmonary disease, unspecified COPD type ([address]    2. Insomnia, unspecified type    3. Urinary frequency    4. Nocturia    5. Positive depression screening    6. Lumbar radiculopathy    7. HNP (herniated nucleus pulposus), lumbar    8. Spondylosis without myelopathy or radiculopathy, lumbar region    9. DDD (degenerative disc disease), lumbar    10. Screening for prostate cancer        No results found for this visit on 06/26/20. Plan:       Favio Dupont was seen today for depression and copd. Diagnoses and all orders for this visit:    Chronic obstructive pulmonary disease, unspecified COPD type ([address]  Patient returned oxygen concentrator  Has not f/u with pulmo yet--encouraged to schedule  Not using inhalers d/t cost  Continue to abstain from tobacco  F/u lung ct due 7/2020    Insomnia, unspecified type  Start   -     traZODone (DESYREL) 50 MG tablet;  Take 1 tablet by mouth nightly as needed for Sleep  Patient warned of potential sedative effects of medication and to refrain from driving, operating machinery, or participating in any activities that

## 2020-06-30 ASSESSMENT — ENCOUNTER SYMPTOMS
ALLERGIC/IMMUNOLOGIC NEGATIVE: 1
WHEEZING: 0
CHEST TIGHTNESS: 0
EYES NEGATIVE: 1
GASTROINTESTINAL NEGATIVE: 1
SHORTNESS OF BREATH: 1
COUGH: 1

## 2020-07-14 RX ORDER — POTASSIUM CHLORIDE 750 MG/1
TABLET, FILM COATED, EXTENDED RELEASE ORAL
Qty: 30 TABLET | Refills: 0 | Status: ON HOLD
Start: 2020-07-14 | End: 2020-09-29 | Stop reason: HOSPADM

## 2020-08-07 RX ORDER — TRAZODONE HYDROCHLORIDE 50 MG/1
TABLET ORAL
Qty: 30 TABLET | Refills: 0 | Status: SHIPPED | OUTPATIENT
Start: 2020-08-07 | End: 2020-10-30 | Stop reason: SDUPTHER

## 2020-08-07 RX ORDER — ONDANSETRON 4 MG/1
TABLET, FILM COATED ORAL
Qty: 30 TABLET | Refills: 0 | Status: SHIPPED | OUTPATIENT
Start: 2020-08-07 | End: 2020-08-19

## 2020-08-07 RX ORDER — IBUPROFEN 800 MG/1
TABLET ORAL
Qty: 90 TABLET | Refills: 0 | Status: ON HOLD | OUTPATIENT
Start: 2020-08-07 | End: 2020-08-23 | Stop reason: HOSPADM

## 2020-08-07 RX ORDER — VENLAFAXINE HYDROCHLORIDE 75 MG/1
CAPSULE, EXTENDED RELEASE ORAL
Qty: 30 CAPSULE | Refills: 0 | Status: SHIPPED | OUTPATIENT
Start: 2020-08-07 | End: 2020-09-30

## 2020-08-19 ENCOUNTER — APPOINTMENT (OUTPATIENT)
Dept: GENERAL RADIOLOGY | Age: 60
DRG: 291 | End: 2020-08-19
Payer: COMMERCIAL

## 2020-08-19 ENCOUNTER — NURSE TRIAGE (OUTPATIENT)
Dept: OTHER | Facility: CLINIC | Age: 60
End: 2020-08-19

## 2020-08-19 ENCOUNTER — HOSPITAL ENCOUNTER (INPATIENT)
Age: 60
LOS: 4 days | Discharge: HOME | DRG: 291 | End: 2020-08-23
Attending: EMERGENCY MEDICINE | Admitting: INTERNAL MEDICINE
Payer: COMMERCIAL

## 2020-08-19 PROBLEM — J96.00 ACUTE RESPIRATORY FAILURE (HCC): Status: ACTIVE | Noted: 2020-08-19

## 2020-08-19 LAB
A/G RATIO: 1.5 (ref 1.1–2.2)
ALBUMIN SERPL-MCNC: 3.7 G/DL (ref 3.4–5)
ALP BLD-CCNC: 65 U/L (ref 40–129)
ALT SERPL-CCNC: 31 U/L (ref 10–40)
ANION GAP SERPL CALCULATED.3IONS-SCNC: 10 MMOL/L (ref 3–16)
APTT: 32.2 SEC (ref 24.2–36.2)
AST SERPL-CCNC: 21 U/L (ref 15–37)
BASOPHILS ABSOLUTE: 0 K/UL (ref 0–0.2)
BASOPHILS RELATIVE PERCENT: 0.3 %
BILIRUB SERPL-MCNC: 0.6 MG/DL (ref 0–1)
BUN BLDV-MCNC: 21 MG/DL (ref 7–20)
CALCIUM SERPL-MCNC: 9.4 MG/DL (ref 8.3–10.6)
CHLORIDE BLD-SCNC: 101 MMOL/L (ref 99–110)
CO2: 32 MMOL/L (ref 21–32)
CREAT SERPL-MCNC: 0.9 MG/DL (ref 0.9–1.3)
D DIMER: 235 NG/ML DDU (ref 0–229)
EKG ATRIAL RATE: 73 BPM
EKG DIAGNOSIS: NORMAL
EKG P AXIS: 46 DEGREES
EKG P-R INTERVAL: 172 MS
EKG Q-T INTERVAL: 370 MS
EKG QRS DURATION: 70 MS
EKG QTC CALCULATION (BAZETT): 407 MS
EKG R AXIS: -50 DEGREES
EKG T AXIS: 63 DEGREES
EKG VENTRICULAR RATE: 73 BPM
EOSINOPHILS ABSOLUTE: 0.1 K/UL (ref 0–0.6)
EOSINOPHILS RELATIVE PERCENT: 1 %
FERRITIN: 48.4 NG/ML (ref 30–400)
FIBRINOGEN: 503 MG/DL (ref 200–397)
GFR AFRICAN AMERICAN: >60
GFR NON-AFRICAN AMERICAN: >60
GLOBULIN: 2.5 G/DL
GLUCOSE BLD-MCNC: 137 MG/DL (ref 70–99)
HCT VFR BLD CALC: 46 % (ref 40.5–52.5)
HEMOGLOBIN: 15.3 G/DL (ref 13.5–17.5)
INR BLD: 1.07 (ref 0.86–1.14)
LACTATE DEHYDROGENASE: 224 U/L (ref 100–190)
LV EF: 58 %
LVEF MODALITY: NORMAL
LYMPHOCYTES ABSOLUTE: 1.7 K/UL (ref 1–5.1)
LYMPHOCYTES RELATIVE PERCENT: 15.3 %
MCH RBC QN AUTO: 30 PG (ref 26–34)
MCHC RBC AUTO-ENTMCNC: 33.3 G/DL (ref 31–36)
MCV RBC AUTO: 90.1 FL (ref 80–100)
MONOCYTES ABSOLUTE: 0.6 K/UL (ref 0–1.3)
MONOCYTES RELATIVE PERCENT: 5.2 %
NEUTROPHILS ABSOLUTE: 8.5 K/UL (ref 1.7–7.7)
NEUTROPHILS RELATIVE PERCENT: 78.2 %
PDW BLD-RTO: 14.2 % (ref 12.4–15.4)
PLATELET # BLD: 174 K/UL (ref 135–450)
PMV BLD AUTO: 8 FL (ref 5–10.5)
POTASSIUM REFLEX MAGNESIUM: 4.6 MMOL/L (ref 3.5–5.1)
PRO-BNP: 177 PG/ML (ref 0–124)
PROTHROMBIN TIME: 12.4 SEC (ref 10–13.2)
RBC # BLD: 5.1 M/UL (ref 4.2–5.9)
SARS-COV-2, NAAT: NOT DETECTED
SODIUM BLD-SCNC: 143 MMOL/L (ref 136–145)
TOTAL PROTEIN: 6.2 G/DL (ref 6.4–8.2)
WBC # BLD: 10.9 K/UL (ref 4–11)

## 2020-08-19 PROCEDURE — 2700000000 HC OXYGEN THERAPY PER DAY

## 2020-08-19 PROCEDURE — 6360000002 HC RX W HCPCS: Performed by: INTERNAL MEDICINE

## 2020-08-19 PROCEDURE — 82728 ASSAY OF FERRITIN: CPT

## 2020-08-19 PROCEDURE — 85379 FIBRIN DEGRADATION QUANT: CPT

## 2020-08-19 PROCEDURE — 36415 COLL VENOUS BLD VENIPUNCTURE: CPT

## 2020-08-19 PROCEDURE — 96374 THER/PROPH/DIAG INJ IV PUSH: CPT

## 2020-08-19 PROCEDURE — 1200000000 HC SEMI PRIVATE

## 2020-08-19 PROCEDURE — 94761 N-INVAS EAR/PLS OXIMETRY MLT: CPT

## 2020-08-19 PROCEDURE — 83880 ASSAY OF NATRIURETIC PEPTIDE: CPT

## 2020-08-19 PROCEDURE — 80053 COMPREHEN METABOLIC PANEL: CPT

## 2020-08-19 PROCEDURE — 6360000002 HC RX W HCPCS: Performed by: EMERGENCY MEDICINE

## 2020-08-19 PROCEDURE — 2580000003 HC RX 258: Performed by: EMERGENCY MEDICINE

## 2020-08-19 PROCEDURE — 85025 COMPLETE CBC W/AUTO DIFF WBC: CPT

## 2020-08-19 PROCEDURE — 83615 LACTATE (LD) (LDH) ENZYME: CPT

## 2020-08-19 PROCEDURE — 85730 THROMBOPLASTIN TIME PARTIAL: CPT

## 2020-08-19 PROCEDURE — 93005 ELECTROCARDIOGRAM TRACING: CPT | Performed by: EMERGENCY MEDICINE

## 2020-08-19 PROCEDURE — 85610 PROTHROMBIN TIME: CPT

## 2020-08-19 PROCEDURE — 6370000000 HC RX 637 (ALT 250 FOR IP): Performed by: PHYSICIAN ASSISTANT

## 2020-08-19 PROCEDURE — 99255 IP/OBS CONSLTJ NEW/EST HI 80: CPT | Performed by: INTERNAL MEDICINE

## 2020-08-19 PROCEDURE — 71045 X-RAY EXAM CHEST 1 VIEW: CPT

## 2020-08-19 PROCEDURE — U0002 COVID-19 LAB TEST NON-CDC: HCPCS

## 2020-08-19 PROCEDURE — 6360000002 HC RX W HCPCS: Performed by: PHYSICIAN ASSISTANT

## 2020-08-19 PROCEDURE — 94640 AIRWAY INHALATION TREATMENT: CPT

## 2020-08-19 PROCEDURE — 99285 EMERGENCY DEPT VISIT HI MDM: CPT

## 2020-08-19 PROCEDURE — 6370000000 HC RX 637 (ALT 250 FOR IP): Performed by: EMERGENCY MEDICINE

## 2020-08-19 PROCEDURE — 99223 1ST HOSP IP/OBS HIGH 75: CPT | Performed by: INTERNAL MEDICINE

## 2020-08-19 PROCEDURE — 85384 FIBRINOGEN ACTIVITY: CPT

## 2020-08-19 PROCEDURE — U0003 INFECTIOUS AGENT DETECTION BY NUCLEIC ACID (DNA OR RNA); SEVERE ACUTE RESPIRATORY SYNDROME CORONAVIRUS 2 (SARS-COV-2) (CORONAVIRUS DISEASE [COVID-19]), AMPLIFIED PROBE TECHNIQUE, MAKING USE OF HIGH THROUGHPUT TECHNOLOGIES AS DESCRIBED BY CMS-2020-01-R: HCPCS

## 2020-08-19 PROCEDURE — 93010 ELECTROCARDIOGRAM REPORT: CPT | Performed by: INTERNAL MEDICINE

## 2020-08-19 PROCEDURE — 2580000003 HC RX 258: Performed by: PHYSICIAN ASSISTANT

## 2020-08-19 PROCEDURE — 93306 TTE W/DOPPLER COMPLETE: CPT

## 2020-08-19 RX ORDER — FUROSEMIDE 10 MG/ML
40 INJECTION INTRAMUSCULAR; INTRAVENOUS ONCE
Status: COMPLETED | OUTPATIENT
Start: 2020-08-19 | End: 2020-08-19

## 2020-08-19 RX ORDER — ACETAMINOPHEN 650 MG/1
650 SUPPOSITORY RECTAL EVERY 6 HOURS PRN
Status: DISCONTINUED | OUTPATIENT
Start: 2020-08-19 | End: 2020-08-19

## 2020-08-19 RX ORDER — ONDANSETRON 2 MG/ML
4 INJECTION INTRAMUSCULAR; INTRAVENOUS EVERY 6 HOURS PRN
Status: DISCONTINUED | OUTPATIENT
Start: 2020-08-19 | End: 2020-08-23 | Stop reason: HOSPADM

## 2020-08-19 RX ORDER — SODIUM CHLORIDE 9 MG/ML
INJECTION, SOLUTION INTRAVENOUS CONTINUOUS
Status: DISCONTINUED | OUTPATIENT
Start: 2020-08-19 | End: 2020-08-19

## 2020-08-19 RX ORDER — IPRATROPIUM BROMIDE AND ALBUTEROL SULFATE 2.5; .5 MG/3ML; MG/3ML
1 SOLUTION RESPIRATORY (INHALATION) EVERY 5 MIN PRN
Status: DISCONTINUED | OUTPATIENT
Start: 2020-08-19 | End: 2020-08-19

## 2020-08-19 RX ORDER — POLYETHYLENE GLYCOL 3350 17 G/17G
17 POWDER, FOR SOLUTION ORAL DAILY PRN
Status: DISCONTINUED | OUTPATIENT
Start: 2020-08-19 | End: 2020-08-23 | Stop reason: HOSPADM

## 2020-08-19 RX ORDER — ALBUTEROL SULFATE 2.5 MG/3ML
2.5 SOLUTION RESPIRATORY (INHALATION) EVERY 30 MIN PRN
Status: DISCONTINUED | OUTPATIENT
Start: 2020-08-19 | End: 2020-08-19

## 2020-08-19 RX ORDER — VENLAFAXINE HYDROCHLORIDE 75 MG/1
75 CAPSULE, EXTENDED RELEASE ORAL
Status: DISCONTINUED | OUTPATIENT
Start: 2020-08-20 | End: 2020-08-23 | Stop reason: HOSPADM

## 2020-08-19 RX ORDER — SODIUM CHLORIDE 0.9 % (FLUSH) 0.9 %
10 SYRINGE (ML) INJECTION PRN
Status: DISCONTINUED | OUTPATIENT
Start: 2020-08-19 | End: 2020-08-23 | Stop reason: HOSPADM

## 2020-08-19 RX ORDER — FUROSEMIDE 10 MG/ML
40 INJECTION INTRAMUSCULAR; INTRAVENOUS 2 TIMES DAILY
Status: DISCONTINUED | OUTPATIENT
Start: 2020-08-19 | End: 2020-08-23 | Stop reason: HOSPADM

## 2020-08-19 RX ORDER — ALBUTEROL SULFATE 90 UG/1
2 AEROSOL, METERED RESPIRATORY (INHALATION) EVERY 6 HOURS PRN
Status: DISCONTINUED | OUTPATIENT
Start: 2020-08-19 | End: 2020-08-23 | Stop reason: HOSPADM

## 2020-08-19 RX ORDER — NICOTINE 21 MG/24HR
1 PATCH, TRANSDERMAL 24 HOURS TRANSDERMAL DAILY
Status: DISCONTINUED | OUTPATIENT
Start: 2020-08-19 | End: 2020-08-23 | Stop reason: HOSPADM

## 2020-08-19 RX ORDER — ALBUTEROL SULFATE 2.5 MG/3ML
2.5 SOLUTION RESPIRATORY (INHALATION)
Status: DISCONTINUED | OUTPATIENT
Start: 2020-08-19 | End: 2020-08-20

## 2020-08-19 RX ORDER — POTASSIUM CHLORIDE 750 MG/1
10 TABLET, EXTENDED RELEASE ORAL DAILY
Status: DISCONTINUED | OUTPATIENT
Start: 2020-08-19 | End: 2020-08-23 | Stop reason: HOSPADM

## 2020-08-19 RX ORDER — PROMETHAZINE HYDROCHLORIDE 25 MG/1
12.5 TABLET ORAL EVERY 6 HOURS PRN
Status: DISCONTINUED | OUTPATIENT
Start: 2020-08-19 | End: 2020-08-23 | Stop reason: HOSPADM

## 2020-08-19 RX ORDER — METHYLPREDNISOLONE SODIUM SUCCINATE 40 MG/ML
40 INJECTION, POWDER, LYOPHILIZED, FOR SOLUTION INTRAMUSCULAR; INTRAVENOUS EVERY 12 HOURS
Status: DISCONTINUED | OUTPATIENT
Start: 2020-08-19 | End: 2020-08-21

## 2020-08-19 RX ORDER — SODIUM CHLORIDE 0.9 % (FLUSH) 0.9 %
10 SYRINGE (ML) INJECTION EVERY 12 HOURS SCHEDULED
Status: DISCONTINUED | OUTPATIENT
Start: 2020-08-19 | End: 2020-08-23 | Stop reason: HOSPADM

## 2020-08-19 RX ORDER — FUROSEMIDE 20 MG/1
20 TABLET ORAL EVERY MORNING
Status: DISCONTINUED | OUTPATIENT
Start: 2020-08-19 | End: 2020-08-19

## 2020-08-19 RX ORDER — ACETAMINOPHEN 325 MG/1
650 TABLET ORAL EVERY 6 HOURS PRN
Status: DISCONTINUED | OUTPATIENT
Start: 2020-08-19 | End: 2020-08-19

## 2020-08-19 RX ORDER — FUROSEMIDE 10 MG/ML
40 INJECTION INTRAMUSCULAR; INTRAVENOUS 2 TIMES DAILY
Status: DISCONTINUED | OUTPATIENT
Start: 2020-08-19 | End: 2020-08-19

## 2020-08-19 RX ORDER — DOXYCYCLINE HYCLATE 100 MG
100 TABLET ORAL EVERY 12 HOURS
Status: DISCONTINUED | OUTPATIENT
Start: 2020-08-19 | End: 2020-08-23 | Stop reason: HOSPADM

## 2020-08-19 RX ORDER — TRAZODONE HYDROCHLORIDE 50 MG/1
50 TABLET ORAL NIGHTLY PRN
Status: DISCONTINUED | OUTPATIENT
Start: 2020-08-19 | End: 2020-08-23 | Stop reason: HOSPADM

## 2020-08-19 RX ORDER — METHYLPREDNISOLONE SODIUM SUCCINATE 125 MG/2ML
125 INJECTION, POWDER, LYOPHILIZED, FOR SOLUTION INTRAMUSCULAR; INTRAVENOUS ONCE
Status: COMPLETED | OUTPATIENT
Start: 2020-08-19 | End: 2020-08-19

## 2020-08-19 RX ADMIN — METHYLPREDNISOLONE SODIUM SUCCINATE 125 MG: 125 INJECTION, POWDER, FOR SOLUTION INTRAMUSCULAR; INTRAVENOUS at 09:40

## 2020-08-19 RX ADMIN — FUROSEMIDE 40 MG: 10 INJECTION, SOLUTION INTRAMUSCULAR; INTRAVENOUS at 15:14

## 2020-08-19 RX ADMIN — DOXYCYCLINE HYCLATE 100 MG: 100 TABLET, COATED ORAL at 14:11

## 2020-08-19 RX ADMIN — ONDANSETRON HYDROCHLORIDE 4 MG: 2 INJECTION, SOLUTION INTRAMUSCULAR; INTRAVENOUS at 20:13

## 2020-08-19 RX ADMIN — Medication 10 ML: at 20:07

## 2020-08-19 RX ADMIN — METHYLPREDNISOLONE SODIUM SUCCINATE 40 MG: 40 INJECTION, POWDER, FOR SOLUTION INTRAMUSCULAR; INTRAVENOUS at 15:14

## 2020-08-19 RX ADMIN — IPRATROPIUM BROMIDE AND ALBUTEROL SULFATE 1 AMPULE: .5; 3 SOLUTION RESPIRATORY (INHALATION) at 09:41

## 2020-08-19 RX ADMIN — ALBUTEROL SULFATE 2.5 MG: 2.5 SOLUTION RESPIRATORY (INHALATION) at 09:41

## 2020-08-19 RX ADMIN — SODIUM CHLORIDE: 9 INJECTION, SOLUTION INTRAVENOUS at 09:45

## 2020-08-19 RX ADMIN — ALBUTEROL SULFATE 2.5 MG: 2.5 SOLUTION RESPIRATORY (INHALATION) at 18:38

## 2020-08-19 RX ADMIN — ACETAMINOPHEN 650 MG: 325 TABLET ORAL at 14:11

## 2020-08-19 RX ADMIN — ALBUTEROL SULFATE 2.5 MG: 2.5 SOLUTION RESPIRATORY (INHALATION) at 23:01

## 2020-08-19 RX ADMIN — TRAZODONE HYDROCHLORIDE 50 MG: 50 TABLET ORAL at 20:13

## 2020-08-19 RX ADMIN — FUROSEMIDE 40 MG: 10 INJECTION, SOLUTION INTRAMUSCULAR; INTRAVENOUS at 20:07

## 2020-08-19 ASSESSMENT — PAIN SCALES - GENERAL: PAINLEVEL_OUTOF10: 5

## 2020-08-19 NOTE — ED NOTES
[facility].  Returned page via [facility]  08/19/20 [id number]

## 2020-08-19 NOTE — ED PROVIDER NOTES
Emergency Physician Note  [id number] [place] [address]  Dept: [phone]  Loc: [phone]    Chief Complaint  COPD (pt states COPD exacerbation x approx 1 wk)       History of Present Illness  Redd Sorto is a 64 y.o. male  has a past medical history of Anxiety, Chronic obstructive pulmonary disease ([address] Crush injury of right foot, DDD (degenerative disc disease), lumbar, Erectile dysfunction, Fatigue, History of alcohol abuse, History of drug use, HNP (herniated nucleus pulposus), lumbar, Hyperglycemia, Hypersomnia, Kidney stone, Lumbar radiculopathy, Lumbar spine pain, Observed sleep apnea, Reactive depression, Spondylosis without myelopathy or radiculopathy, lumbar region, and Tobacco use. who presents to the ED for shortness of breath. Patient reports he has been having increasing shortness of breath for the past 2 weeks. He did quit smoking in March. He initially did have continuous oxygen at home but it was removed because he had improved significantly. Patient states he has noticed lower extremity edema. He is also had a cough productive of gray/dark sputum. Denies any fever. Has not been on steroids recently. Cannot recall when the last time he had antibiotics. He has been using his nebulizer treatments at home without relief. He came in today because he tried to exert himself by walking and he got significantly short of breath. Denies fever,   malaise, chest pain,  abdominal pain, nausea, vomiting, diarrhea, headache,    back pain, rash. No palliative/provocative factors.        Unless otherwise stated in this report or unable to obtain because of the patient's clinical or mental status as evidenced by the medical record, this patient's positive and negative responses for review of systems, constitutional, psych, eyes, ENT, cardiovascular, respiratory, gastrointestinal, neurological, genitourinary, musculoskeletal, integument systems and systems related to the presenting problem are either stated in the preceding paragraph or were not pertinent or were negative for the symptoms and/or complaints related to the medical problem. I have reviewed the following from the nursing documentation:      Prior to Admission medications    Medication Sig Start Date End Date Taking?  Authorizing Provider   venlafaxine (EFFEXOR XR) 75 MG extended release capsule TAKE ONE CAPSULE BY MOUTH DAILY 8/7/20  Yes ANICETO Santiago CNP   traZODone (DESYREL) 50 MG tablet TAKE ONE TABLET BY MOUTH ONCE NIGHTLY AS NEEDED FOR SLEEP 8/7/20  Yes ANICETO Santiago CNP   ibuprofen (ADVIL;MOTRIN) 800 MG tablet TAKE ONE TABLET BY MOUTH THREE TIMES A DAY 8/7/20  Yes ANICETO Santiago CNP   furosemide (LASIX) 20 MG tablet Take 1 tablet by mouth every morning 4/9/20  Yes ANICETO Santiago CNP   albuterol (PROVENTIL) (2.5 MG/3ML) 0.083% nebulizer solution Take 3 mLs by nebulization every 6 hours as needed for Wheezing or Shortness of Breath 9/25/19  Yes ANICETO Santiago CNP   ipratropium (ATROVENT) 0.02 % nebulizer solution Take 2.5 mLs by nebulization 4 times daily as needed for Wheezing (short of breath) 9/25/19  Yes ANICETO Santiago CNP   albuterol sulfate HFA (PROVENTIL HFA) 108 (90 Base) MCG/ACT inhaler Inhale 2 puffs into the lungs every 6 hours as needed for Wheezing 5/17/19  Yes Dianna Oconnell MD   potassium chloride (KLOR-CON) 10 MEQ extended release tablet TAKE ONE TABLET BY MOUTH DAILY 7/14/20   ANICETO Pettit CNP       Allergies as of 08/19/2020 - Review Complete 08/19/2020   Allergen Reaction Noted    Codeine Itching 03/16/2011    Dilaudid [hydromorphone hcl]  05/17/2019       Past Medical History:   Diagnosis Date    Anxiety 1/6/2020    Chronic obstructive pulmonary disease ([address] 9/3/2019    PFT done 9/03/19    Crush injury of right foot 7/23/2015    DDD (degenerative disc disease), lumbar 1/6/2020    Erectile dysfunction 1/6/2020    Fatigue 1/6/2020    History of alcohol abuse 1/6/2020    History of drug use     HNP (herniated nucleus pulposus), lumbar 1/6/2020    Hyperglycemia 1/6/2020    Hypersomnia 1/6/2020    Kidney stone     Lumbar radiculopathy 1/6/2020    Lumbar spine pain 1/6/2020    Observed sleep apnea 1/6/2020    Reactive depression 1/6/2020    Spondylosis without myelopathy or radiculopathy, lumbar region 1/6/2020    Tobacco use 1/6/2020        Surgical History:   Past Surgical History:   Procedure Laterality Date    CHOLECYSTECTOMY      CYSTOSCOPY  03/16/2011    cystoscopy left ureteroscopy, left retrograde, double J stent placement    PAIN MANAGEMENT PROCEDURE Right 12/24/2019    RIGHT LUMBAR FIVE SACRAL ONE TRANSFORAMINAL EPIDURAL STEROID INJECTION SITE CONFIRMED BY FLUOROSCOPY performed by Ana Ruiz MD at [facility] Right 1/7/2020    RIGHT LUMBAR FOUR AND LUMBAR FIVE TRANSFORAMINAL EPIDURAL STEROID INJECTION SITE CONFIRMED BY FLUOROSCOPY performed by Ana Ruiz MD at [address] OR        Family History:    Family History   Problem Relation Age of Onset    Heart Disease Mother     Other Mother         copd    Liver Disease Father     High Blood Pressure Sister     No Known Problems Sister        Social History     Socioeconomic History    Marital status:      Spouse name: Not on file    Number of children: Not on file    Years of education: Not on file    Highest education level: Not on file   Occupational History    Not on file   Social Needs    Financial resource strain: Not on file    Food insecurity     Worry: Not on file     Inability: Not on file    Transportation needs     Medical: Not on file     Non-medical: Not on file   Tobacco Use    Smoking status: Former Smoker     Packs/day: 2.00     Years: 35.00     Pack years: 70.00     Start date: 67    Smokeless tobacco: Never Used   Substance and Sexual Activity    Alcohol use: No    Drug use: Not Currently     Comment: hx of drug use    Sexual activity: Not on file   Lifestyle    Physical activity     Days per week: Not on file     Minutes per session: Not on file    Stress: Not on file   Relationships    Social connections     Talks on phone: Not on file     Gets together: Not on file     Attends Che[employer] service: Not on file     Active member of club or organization: Not on file     Attends meetings of clubs or organizations: Not on file     Relationship status: Not on file    Intimate partner violence     Fear of current or ex partner: Not on file     Emotionally abused: Not on file     Physically abused: Not on file     Forced sexual activity: Not on file   Other Topics Concern    Not on file   Social History Narrative    Not on file       Nursing notes reviewed. ED Triage Vitals [08/19/20 0921]   Enc Vitals Group      /82      Pulse 80      Resp 28      Temp 97.8 °F (36.6 °C)      Temp src       SpO2 (!) 89 %      Weight 287 lb (130.2 kg)      Height 5' 5\" (1.651 m)      Head Circumference       Peak Flow       Pain Score       Pain Loc       Pain Edu? Excl. in [address]? GENERAL:    Obese, awake, alert. Well developed, well nourished with no apparent distress. Nontoxic-appearing, non-ill-appearing. HENT:    Normocephalic, Atraumatic, no lacerations. No ENT exam due to PPE. EYES:    Conjunctiva normal,   Pupils equal round and reactive to light,   Extraocular movements normal.  NECK:    Trachea is midline. No stridor. CHEST:    Regular rate and regular rhythm, no murmurs/rubs/gallops,   normal S1/S2, chest wall non-tender. LUNGS:    Mild respiratory distress. No abdominal retractions, no sternal retractions.    Diminished breath sounds bilaterally with poor air movement and diffuse end expiratory wheezing, no rhochi, no rales  Not Speaking comfortably in full sentences  ABDOMEN:    Soft, non-tender, non-distended. No guarding. No rebound. EXTREMITIES:   Moves extremities x4 with purpose. Normal range of motion, mild nonpitting edema of the foot the ankle and the calf but does not involve the knee of both lower extremities  no tenderness, no deformity,   distal pulses present and equal bilaterally. SKIN:    Warm, dry and intact. NEUROLOGIC:  Normal mental status. Moving all extremities to command. Alert and oriented x4   without focal motor deficit or gross sensory deficit. Normal speech. PSYCHIATRIC:  Not anxious,   normal mood and affect,   thoughts are linear and organized,   without delusions/hallucinations,   responds appropriately to questions      LABS and DIAGNOSTIC RESULTS  EKG  The Ekg interpreted by me shows  normal sinus rhythm with a rate of 73  Axis is   Left axis deviation  QTc is  normal  Intervals and Durations are unremarkable. ST Segments: no acute change and normal  Delta waves, Brugada Syndrome, and Short [place] are not present. Prior EKG to compare with was available. No significant changes compared to prior EKG from April 4, 2020      Cardiac Monitor Strip Interpretation    Interpreted by me  Monitor strip interpreted for greater than 10 seconds    Rhythm: normal sinus   Rate: normal  Ectopy: none  ST Segments: normal    RADIOLOGY  X-RAYS:  I have reviewed radiologic plain film image(s). ALL OTHER NON-PLAIN FILM IMAGES SUCH AS CT, ULTRASOUND AND MRI HAVE BEEN READ BY THE RADIOLOGIST. XR CHEST PORTABLE   Final Result   Cardiac silhouette appears significantly enlarged compared to prior study   suggesting a pericardial effusion. This appearance may be exacerbated by   superimposition of soft tissues. No definite pulmonary consolidation.               Chest X-Ray  Interpreted by: Emergency Department Physician  View: Portable chest xray  Findings: No infiltrates, No hemothorax, No pneumothorax, No right ventricular dilation with RV to LV ratio < 1:1. Patient tolerated the procedure well. MEDICAL DECISION MAKING    Procedures/interventions/images ordered for this visit  Orders Placed This Encounter   Procedures    XR CHEST PORTABLE    CBC Auto Differential    Comprehensive Metabolic Panel w/ Reflex to MG    Brain Natriuretic Peptide    Initiate Oxygen Therapy Protocol    EKG 12 Lead    Saline lock IV       Medications ordered for this visit  Orders Placed This Encounter   Medications    0.9 % sodium chloride infusion    ipratropium-albuterol (DUONEB) nebulizer solution 1 ampule    methylPREDNISolone sodium (SOLU-MEDROL) injection 125 mg    albuterol (PROVENTIL) nebulizer solution 2.5 mg       ED course notes for this visit  ED Course as of Aug 19 1040   Wed Aug 19, 2020   1038 I went back and discussed the results of the chest x-ray with the patient. He states that he still feels feel extremely tired and short of breath and is not really had any improvement after treatment with nebulizers. At this time we are doing the ambulation trial.  We did take him off the oxygen and he desaturated to 88% without any exertion. At this time the patient is aware that he will be admitted. I reexamined his lungs, he continues to have poor air movement with end expiratory wheezing, fortunately he is not in significant respiratory distress, he is able to speak in full sentences. [SG]      ED Course User Index  [SG] Mary Hinojosa MD       I wore goggles, surgical mask, N95 mask and gloves when I evaluated the patient.     I evaluated the patient in room 03/03    Results for orders placed or performed during the hospital encounter of 08/19/20   CBC Auto Differential   Result Value Ref Range    WBC 10.9 4.0 - 11.0 K/uL    RBC 5.10 4.20 - 5.90 M/uL    Hemoglobin 15.3 13.5 - 17.5 g/dL    Hematocrit 46.0 40.5 - 52.5 %    MCV 90.1 80.0 - 100.0 fL    MCH 30.0 26.0 - 34.0 pg    MCHC 33.3 31.0 - 36.0 g/dL have deemed necessary from their history, physical, and studies, I have considered and evaluated Al Dear for the following diagnoses:  ACUTE CORONARY SYNDROME, CHRONIC OBSTRUCTIVE PULMONARY DISEASE, CONGESTIVE HEART FAILURE, PERICARDIAL TAMPONADE, PNEUMONIA, PNEUMOTHORAX, PULMONARY EMBOLISM, SEPSIS, and THORACIC DISSECTION. FINAL IMPRESSION  1. Acute on chronic respiratory failure with hypoxemia (HCC)    2. Hypoxemia requiring supplemental oxygen    3. Chronic obstructive pulmonary disease with acute exacerbation (HCC)        Vitals:  Blood pressure 134/76, pulse 92, temperature 97.8 °F (36.6 °C), resp. rate 20, height 5' 5\" (1.651 m), weight 287 lb (130.2 kg), SpO2 94 %. Disposition       Pt is in stable condition upon Admit to telemetry. Please note, critical care time was at least 35-40 minutes, obtaining history, conducting a physical exam, performing and monitoring interventions, ordering, collecting and interpreting tests, and establishing medical decision-making and discussion with the patient and/or family, specifically for management of the presenting complaint and symptoms initially, direct bedside care, reevaluation, review of records, and consultation. There was a high probability of clinically significant life-threatening deterioration in the patient's condition, which required my urgent intervention. This time does not include separately billable procedures. The note was completed using Dragon voice recognition transcription. Every effort was made to ensure accuracy; however, inadvertent transcription errors may be present despite my best efforts to edit errors.     Romana Garay MD  [address] [place] [address]        Romana Garay MD  08/19/20 0206

## 2020-08-19 NOTE — PLAN OF CARE
Admit to Med Surg, tele    Acute resp failure  COPD AE  COVID R/O    Abnormal CXR - cardiomegaly increased from prior CXR, no hypotension, tele and check echo

## 2020-08-19 NOTE — PROGRESS NOTES
C/s Dr Deatrice Gilford has been called to Dr. Christine Block on 8/19/20.  Spoke with tad. 1:45 PM    Malik Stanford  8/19/2020

## 2020-08-19 NOTE — TELEPHONE ENCOUNTER
Reason for Disposition   MODERATE difficulty breathing (e.g., speaks in phrases, SOB even at rest, pulse 100-120) of new onset or worse than normal    Answer Assessment - Initial Assessment Questions  1. RESPIRATORY STATUS: \"Describe your breathing? \" (e.g., wheezing, shortness of breath, unable to speak, severe coughing)       Sob at rest and with exertion  2. ONSET: \"When did this breathing problem begin? \"       A few weeks ago  3. PATTERN \"Does the difficult breathing come and go, or has it been constant since it started? \"      It has been constant  4. SEVERITY: \"How bad is your breathing? \" (e.g., mild, moderate, severe)     - MILD: No SOB at rest, mild SOB with walking, speaks normally in sentences, can lay down, no retractions, pulse < 100.     - MODERATE: SOB at rest, SOB with minimal exertion and prefers to sit, cannot lie down flat, speaks in phrases, mild retractions, audible wheezing, pulse 100-120.     - SEVERE: Very SOB at rest, speaks in single words, struggling to breathe, sitting hunched forward, retractions, pulse > 120      Moderate-severe at times  5. RECURRENT SYMPTOM: \"Have you had difficulty breathing before? \" If so, ask: \"When was the last time? \" and \"What happened that time? \"       Yes, last march was intubated  6. CARDIAC HISTORY: \"Do you have any history of heart disease? \" (e.g., heart attack, angina, bypass surgery, angioplasty)       denies  7. LUNG HISTORY: \"Do you have any history of lung disease? \"  (e.g., pulmonary embolus, asthma, emphysema)      COPD  8. CAUSE: \"What do you think is causing the breathing problem? \"       unsure  9. OTHER SYMPTOMS: \"Do you have any other symptoms? (e.g., dizziness, runny nose, cough, chest pain, fever)      Chest congested  10. PREGNANCY: \"Is there any chance you are pregnant? \" \"When was your last menstrual period? \"        No, male  6. TRAVEL: \"Have you traveled out of the country in the last month? \" (e.g., travel history, exposures) No    Protocols used: BREATHING DIFFICULTY-ADULT-[place]  Caller provided care advice and instructed to call back with worsening symptoms. Patient c/o being sob at rest, unable to ambulate without respiratory distress. Encouraged to go to ER. Pt agreeable.

## 2020-08-19 NOTE — H&P
Hospital Medicine History & Physical      PCP: Barron Morocho APRN - ROXANA    Date of Admission: 8/19/2020    Date of Service: Pt seen/examined on 8/19/2020    Chief Complaint:    Chief Complaint   Patient presents with    COPD     pt states COPD exacerbation x approx 1 wk     History Of Present Illness: The patient is a 64 y.o. male with COPD, anxiety, hx of ETOH and drug abuse, OANH, depression who presented to [facility] ED with complaint of COPD exacerbation. Obese 53-year-old male presenting with increasing shortness of breath. symptoms ongoing for 2 weeks, progressively getting worse, patient now dyspneic with minimal exertion. He has significant edema of his lower extremities,  his abdomen and his hands. He states he feels bloated. He presumes he has gained weight , but does not know how much. He is not on home oxygen, currently requiring 4 L of oxygen   Denies any chest pain, denies any fevers or chills    Past history significant for admission here in March for acute respiratory failure from pneumonia and influenza, requiring intubation. Imaging studies concerning for pericardial effusion  Rapid COVID-19 pending if this is negative we will get a stat echocardiogram today    Started diuresis with IV Lasix. Patient also states that his urine output has been minimal for the last few days.  Starting to urinate now        Past Medical History:        Diagnosis Date    Anxiety 1/6/2020    Chronic obstructive pulmonary disease ([facility] [address] 9/3/2019    PFT done 9/03/19    Crush injury of right foot 7/23/2015    DDD (degenerative disc disease), lumbar 1/6/2020    Erectile dysfunction 1/6/2020    Fatigue 1/6/2020    History of alcohol abuse 1/6/2020    History of drug use     HNP (herniated nucleus pulposus), lumbar 1/6/2020    Hyperglycemia 1/6/2020    Hypersomnia 1/6/2020    Kidney stone     Lumbar radiculopathy 1/6/2020    Lumbar spine pain 1/6/2020    Observed sleep apnea 1/6/2020    Reactive depression 1/6/2020    Spondylosis without myelopathy or radiculopathy, lumbar region 1/6/2020    Tobacco use 1/6/2020       Past Surgical History:        Procedure Laterality Date    CHOLECYSTECTOMY      CYSTOSCOPY  03/16/2011    cystoscopy left ureteroscopy, left retrograde, double J stent placement    PAIN MANAGEMENT PROCEDURE Right 12/24/2019    RIGHT LUMBAR FIVE SACRAL ONE TRANSFORAMINAL EPIDURAL STEROID INJECTION SITE CONFIRMED BY FLUOROSCOPY performed by José Miguel Schroeder MD at [address] Right 1/7/2020    RIGHT LUMBAR FOUR AND LUMBAR FIVE TRANSFORAMINAL EPIDURAL STEROID INJECTION SITE CONFIRMED BY FLUOROSCOPY performed by José Miguel Schroeder MD at [place] [address]       Medications Prior to Admission:    Prior to Admission medications    Medication Sig Start Date End Date Taking?  Authorizing Provider   venlafaxine (EFFEXOR XR) 75 MG extended release capsule TAKE ONE CAPSULE BY MOUTH DAILY 8/7/20  Yes ANICETO Santiago CNP   traZODone (DESYREL) 50 MG tablet TAKE ONE TABLET BY MOUTH ONCE NIGHTLY AS NEEDED FOR SLEEP 8/7/20  Yes ANICETO Santiago CNP   ibuprofen (ADVIL;MOTRIN) 800 MG tablet TAKE ONE TABLET BY MOUTH THREE TIMES A DAY 8/7/20  Yes ANICETO Santiago CNP   furosemide (LASIX) 20 MG tablet Take 1 tablet by mouth every morning 4/9/20  Yes ANICETO Santiago CNP   albuterol (PROVENTIL) (2.5 MG/3ML) 0.083% nebulizer solution Take 3 mLs by nebulization every 6 hours as needed for Wheezing or Shortness of Breath 9/25/19  Yes ANICETO Santiago CNP   ipratropium (ATROVENT) 0.02 % nebulizer solution Take 2.5 mLs by nebulization 4 times daily as needed for Wheezing (short of breath) 9/25/19  Yes ANICETO Santiago CNP   albuterol sulfate HFA (PROVENTIL HFA) 108 (90 Base) MCG/ACT inhaler Inhale 2 puffs into the lungs every 6 hours as needed for Wheezing 5/17/19  Yes Dianna Oconnell MD   potassium chloride (KLOR-CON) 10 MEQ extended release tablet TAKE ONE TABLET BY MOUTH DAILY 7/14/20   Bao Frizzle, APRN - CNP       Allergies:  Codeine and Dilaudid [hydromorphone hcl]    Social History:  The patient currently lives at home    TOBACCO:   reports that he has quit smoking. He started smoking about 46 years ago. He has a 70.00 pack-year smoking history. He has never used smokeless tobacco.  ETOH:   reports no history of alcohol use. Family History:   Positive as follows:        Problem Relation Age of Onset    Heart Disease Mother     Other Mother         copd    Liver Disease Father     High Blood Pressure Sister     No Known Problems Sister        REVIEW OF SYSTEMS:     Constitutional: Negative for fever   HENT: Negative for sore throat   Eyes: Negative for redness   Respiratory: + for dyspnea, no cough   Cardiovascular: Negative for chest pain   Gastrointestinal: Negative for vomiting, diarrhea   Genitourinary: Negative for hematuria   Musculoskeletal: Negative for arthralgias  + edema of extremities   Skin: Negative for rash   Neurological: Negative for syncope   Hematological: Negative for adenopathy   Psychiatric/Behavorial: Negative for anxiety        PHYSICAL EXAM:    BP (!) 157/76   Pulse 88   Temp 98.3 °F (36.8 °C) (Oral)   Resp 18   Ht 5' 5\" (1.651 m)   Wt 287 lb (130.2 kg)   SpO2 97%   BMI 47.76 kg/m²   Gen: Obese, in mild distress. Dyspneic . Alert. Awake, oriented X 3  Eyes: PERRL. No sclera icterus. No conjunctival injection. ENT: No discharge. Pharynx clear. Neck: No JVD. No Carotid Bruit. Trachea midline. Resp: + accessory muscle use. Diminished breath sounds . no crackles. No wheezes. No rhonchi. CV: Regular rate. Regular rhythm. No murmur. No rub. LE edema 2 +  Capillary Refill: Brisk,< 3 seconds   Peripheral Pulses: +2 palpable, equal bilaterally   GI: Non-tender. Non-distended. No masses. No organomegaly. Normal bowel sounds. No hernia. Skin: Warm and dry. No nodule on exposed extremities. No rash on exposed extremities. M/S: No cyanosis. No joint deformity. No clubbing. norma LE 2 + edema   Neuro: Awake. Alert,  nonfocal    Psych: Oriented x 3. No anxiety or agitation. CBC:   Recent Labs     08/19/20  0930   WBC 10.9   HGB 15.3   HCT 46.0   MCV 90.1        BMP:   Recent Labs     08/19/20  0930      K 4.6      CO2 32   BUN 21*   CREATININE 0.9     LIVER PROFILE:   Recent Labs     08/19/20  0930   AST 21   ALT 31   BILITOT 0.6   ALKPHOS 65       CULTURES  Respiratory Cx: pending   COVID-19: pending     EKG:  I have reviewed the EKG with the following interpretation:   Normal sinus rhythm, Left axis deviation  Low voltage QRS, Inferior infarct , age undetermined  Cannot rule out Anterior infarct , age undetermined    No acute change compared to prior     RADIOLOGY  XR CHEST PORTABLE   Final Result   Cardiac silhouette appears significantly enlarged compared to prior study   suggesting a pericardial effusion. This appearance may be exacerbated by   superimposition of soft tissues. No definite pulmonary consolidation. Pertinent previous results reviewed   Echo 7/19/19   Summary   Left ventricular systolic function is normal with ejection fraction   estimated at 55%. No regional wall motion abnormalities. There is mild concentric left ventricular hypertrophy. Grade I diastolic dysfunction with normal left ventricular filling pressure. Mild mitral regurgitation. ASSESSMENT/PLAN:  Acute Hypoxic Respiratory Failure  - Not usually on home O2   - Now requiring 4L NC O2  - Suspect 2/2 to CHF. Pt appears fluid overloaded .  R/O pericardial effusion   - DROPLET + PRECAUTIONS   - COVID-19 TESTING PENDING-->very low concerns for active COVID infection, will check a rapid COVID to facilitate getting ECHO performed     Cardiomegaly  Concerns for Pericardial Effusion   - significantly worsened compared to prior exam, concerning for possible pericardial effusion   - Echo STAT (needs negative COVID testing first, getting rapid testing now)   - EKG with low voltage, but present on prior EKG as well   - hemodynamically stable     Acute on Chronic dCHF  - Last Echo with normal EF and grade I DD  - appears very fluid overloaded   - start  IV Lasix 40 mg BID, strict I&Os and daily weights   - Echo pending   - continue low sodium diet   - 263 lbs in April on an ED visit, 287 lbs on admission     COPD AE  - NO NEBULIZERS until COVID has been ruled out  - Continue MDI, doxycycline, IV steroids   - Pulmonology consult     OANH  - no BiPAP or CPAP until COVID ruled out     Depression   Anxiety   - Continue home medications     Insomnia  - continue Trazodone      Morbid Obesity  - Body mass index is 47.76 kg/m². - Complicating assessment and treatment. Placing patient at risk for multiple co-morbidities as well as early death and contributing to the patient's presentation.   - Counseled on weight loss.     Tobacco Abuse   - Recommend cessation   - PRN Nicotine patch/gum     DVT Prophylaxis: Lovenox  Diet: DIET LOW SODIUM 2 GM;   Code Status: Full Code     Cullen Rolon MD   8/19/2020

## 2020-08-19 NOTE — ED NOTES
Pt taken off nasal cannula per dr to ambulate. Pt O2 sat 84% on room air while sitting in bed.  Pt placed back on nasal cannula at 2lpm.     Stacey Every  08/19/20 0782

## 2020-08-19 NOTE — FLOWSHEET NOTE
08/19/20 1318   Encounter Summary   Services provided to: Patient not available   Referral/Consult From: [id number] Ryan Houser none listed   Continue Visiting   (8/19 Pt. being followed by [facility];silent prayer)   Length of Encounter 15 minutes

## 2020-08-19 NOTE — ED NOTES
Report called to Velma Manrique RN @ [address] in SBR format.       Romana [facility] ANGEL CASTAÑEDA, RN  08/19/20 3412

## 2020-08-19 NOTE — ED NOTES
[facility] here to transport pt. Pt remains alert and without c/o's.  resps even and unlab     Judge Rocio Milligan RN  08/19/20 5433

## 2020-08-19 NOTE — CONSULTS
HISTORY:  family history includes Heart Disease in his mother; High Blood Pressure in his sister; Liver Disease in his father; No Known Problems in his sister; Other in his mother. SOCIAL HISTORY:   reports that he has quit smoking. He started smoking about 46 years ago. He has a 70.00 pack-year smoking history. He has never used smokeless tobacco.    Scheduled Meds:   furosemide  20 mg Oral QAM    potassium chloride  10 mEq Oral Daily    [START ON 8/20/2020] venlafaxine  75 mg Oral Daily with breakfast    sodium chloride flush  10 mL Intravenous 2 times per day    enoxaparin  40 mg Subcutaneous Daily    doxycycline hyclate  100 mg Oral Q12H     Continuous Infusions:    PRN Meds:  albuterol sulfate HFA, traZODone, sodium chloride flush, acetaminophen **OR** acetaminophen, polyethylene glycol, promethazine **OR** ondansetron    ALLERGIES:  Patient is allergic to codeine and dilaudid [hydromorphone hcl]. REVIEW OF SYSTEMS:  Constitutional: Negative for fever  HENT: Negative for sore throat  Eyes: Negative for redness   Respiratory: + for dyspnea, cough  Cardiovascular: Negative for chest pain, +edema  Gastrointestinal: Negative for vomiting, diarrhea   Genitourinary: Negative for hematuria   Musculoskeletal: Negative for arthralgias   Skin: Negative for rash  Neurological: Negative for syncope  Hematological: Negative for adenopathy  Psychiatric/Behavorial: Negative for anxiety    PHYSICAL EXAM:  Blood pressure (!) 145/88, pulse 83, temperature 97.5 °F (36.4 °C), temperature source Oral, resp. rate 18, height 5' 5\" (1.651 m), weight 287 lb (130.2 kg), SpO2 94 %.' on 4 L  Gen: mild distress. Eyes: PERRL. No sclera icterus. No conjunctival injection. ENT: No discharge. Pharynx clear. Neck: Trachea midline. No obvious mass. Resp: + accessory muscle use. No crackles. No wheezes. No rhonchi. No dullness on percussion. CV: Regular rate. Regular rhythm. No murmur or rub. ++ edema.  Peripheral pulses are 2+.  Capillary refill is less than 3 seconds. GI: Non-tender. Non-distended. No hernia. Skin: Warm and dry. No nodule on exposed extremities. Lymph: No cervical LAD. No supraclavicular LAD. M/S: No cyanosis. No joint deformity. No clubbing. Neuro: Awake. Alert. Moves all four extremities. Psych: Oriented x 3. No anxiety. LABS:  CBC:   Recent Labs     08/19/20  0930   WBC 10.9   HGB 15.3   HCT 46.0   MCV 90.1        BMP:   Recent Labs     08/19/20  0930      K 4.6      CO2 32   BUN 21*   CREATININE 0.9     LIVER PROFILE:   Recent Labs     08/19/20  0930   AST 21   ALT 31   BILITOT 0.6   ALKPHOS 65     PT/INR: No results for input(s): PROTIME, INR in the last 72 hours. APTT: No results for input(s): APTT in the last 72 hours. UA:No results for input(s): NITRITE, COLORU, PHUR, LABCAST, WBCUA, RBCUA, MUCUS, TRICHOMONAS, YEAST, BACTERIA, CLARITYU, SPECGRAV, LEUKOCYTESUR, UROBILINOGEN, BILIRUBINUR, BLOODU, GLUCOSEU, AMORPHOUS in the last 72 hours. Invalid input(s): KETONESU  No results for input(s): PHART, MJB3IJD, PO2ART in the last 72 hours. Chest imaging was reviewed by me and showed   8/19/20 CXR  Impression:          Cardiac silhouette appears significantly enlarged compared to prior study   suggesting a pericardial effusion.  This appearance may be exacerbated by   superimposition of soft tissues.  No definite pulmonary consolidation. ASSESSMENT:  · Acute on chronic hypoxemic respiratory failure   · Shortness of breath  · Lower extremity edema   · Orthopnea  · Moderately severe COPD, not clearly in exacerbation  · Abnormal chest x-ray, concern for pericardial effusion    PLAN:  · STAT COVID, if negative take out of isolation and perform STAT Echo. I d/w Echo tech, and they cannot perform Echo in isolation patient. · If unable to get Echo, then will obtain CT imaging. Pericardial effusion with possible tamponade physiology must be ruled out.   · Diuresis  Supplemental oxygen to maintain SaO2 >92%; wean as tolerated    IV solumedrol with plan to convert to oral prednisone with improvement  Inhaled bronchodilators   Doxycycline D#1  Tobacco cessation has been achieved  D/W Dr. Yamil Villanueva

## 2020-08-20 ENCOUNTER — APPOINTMENT (OUTPATIENT)
Dept: GENERAL RADIOLOGY | Age: 60
DRG: 291 | End: 2020-08-20
Payer: COMMERCIAL

## 2020-08-20 LAB
ANION GAP SERPL CALCULATED.3IONS-SCNC: 7 MMOL/L (ref 3–16)
BASOPHILS ABSOLUTE: 0.1 K/UL (ref 0–0.2)
BASOPHILS RELATIVE PERCENT: 0.3 %
BUN BLDV-MCNC: 19 MG/DL (ref 7–20)
CALCIUM SERPL-MCNC: 9.1 MG/DL (ref 8.3–10.6)
CHLORIDE BLD-SCNC: 97 MMOL/L (ref 99–110)
CO2: 31 MMOL/L (ref 21–32)
CREAT SERPL-MCNC: 0.7 MG/DL (ref 0.9–1.3)
EOSINOPHILS ABSOLUTE: 0 K/UL (ref 0–0.6)
EOSINOPHILS RELATIVE PERCENT: 0 %
GFR AFRICAN AMERICAN: >60
GFR NON-AFRICAN AMERICAN: >60
GLUCOSE BLD-MCNC: 141 MG/DL (ref 70–99)
HCT VFR BLD CALC: 49.3 % (ref 40.5–52.5)
HEMOGLOBIN: 15.8 G/DL (ref 13.5–17.5)
LYMPHOCYTES ABSOLUTE: 0.7 K/UL (ref 1–5.1)
LYMPHOCYTES RELATIVE PERCENT: 3.9 %
MCH RBC QN AUTO: 28.7 PG (ref 26–34)
MCHC RBC AUTO-ENTMCNC: 32 G/DL (ref 31–36)
MCV RBC AUTO: 89.5 FL (ref 80–100)
MONOCYTES ABSOLUTE: 0.3 K/UL (ref 0–1.3)
MONOCYTES RELATIVE PERCENT: 1.8 %
NEUTROPHILS ABSOLUTE: 16.5 K/UL (ref 1.7–7.7)
NEUTROPHILS RELATIVE PERCENT: 94 %
PDW BLD-RTO: 14.2 % (ref 12.4–15.4)
PLATELET # BLD: 206 K/UL (ref 135–450)
PMV BLD AUTO: 8.4 FL (ref 5–10.5)
POTASSIUM REFLEX MAGNESIUM: 4.9 MMOL/L (ref 3.5–5.1)
PROCALCITONIN: 0.04 NG/ML (ref 0–0.15)
RBC # BLD: 5.5 M/UL (ref 4.2–5.9)
SODIUM BLD-SCNC: 135 MMOL/L (ref 136–145)
WBC # BLD: 17.6 K/UL (ref 4–11)

## 2020-08-20 PROCEDURE — 6360000002 HC RX W HCPCS: Performed by: INTERNAL MEDICINE

## 2020-08-20 PROCEDURE — 85025 COMPLETE CBC W/AUTO DIFF WBC: CPT

## 2020-08-20 PROCEDURE — 2580000003 HC RX 258: Performed by: PHYSICIAN ASSISTANT

## 2020-08-20 PROCEDURE — 1200000000 HC SEMI PRIVATE

## 2020-08-20 PROCEDURE — 6370000000 HC RX 637 (ALT 250 FOR IP): Performed by: INTERNAL MEDICINE

## 2020-08-20 PROCEDURE — 94640 AIRWAY INHALATION TREATMENT: CPT

## 2020-08-20 PROCEDURE — 84145 PROCALCITONIN (PCT): CPT

## 2020-08-20 PROCEDURE — 36415 COLL VENOUS BLD VENIPUNCTURE: CPT

## 2020-08-20 PROCEDURE — U0003 INFECTIOUS AGENT DETECTION BY NUCLEIC ACID (DNA OR RNA); SEVERE ACUTE RESPIRATORY SYNDROME CORONAVIRUS 2 (SARS-COV-2) (CORONAVIRUS DISEASE [COVID-19]), AMPLIFIED PROBE TECHNIQUE, MAKING USE OF HIGH THROUGHPUT TECHNOLOGIES AS DESCRIBED BY CMS-2020-01-R: HCPCS

## 2020-08-20 PROCEDURE — 99232 SBSQ HOSP IP/OBS MODERATE 35: CPT | Performed by: INTERNAL MEDICINE

## 2020-08-20 PROCEDURE — 6360000002 HC RX W HCPCS: Performed by: PHYSICIAN ASSISTANT

## 2020-08-20 PROCEDURE — 94761 N-INVAS EAR/PLS OXIMETRY MLT: CPT

## 2020-08-20 PROCEDURE — 80048 BASIC METABOLIC PNL TOTAL CA: CPT

## 2020-08-20 PROCEDURE — 2700000000 HC OXYGEN THERAPY PER DAY

## 2020-08-20 PROCEDURE — 6370000000 HC RX 637 (ALT 250 FOR IP): Performed by: PHYSICIAN ASSISTANT

## 2020-08-20 PROCEDURE — 71046 X-RAY EXAM CHEST 2 VIEWS: CPT

## 2020-08-20 RX ORDER — IPRATROPIUM BROMIDE AND ALBUTEROL SULFATE 2.5; .5 MG/3ML; MG/3ML
1 SOLUTION RESPIRATORY (INHALATION)
Status: DISCONTINUED | OUTPATIENT
Start: 2020-08-20 | End: 2020-08-23 | Stop reason: HOSPADM

## 2020-08-20 RX ORDER — ACETAMINOPHEN 325 MG/1
650 TABLET ORAL EVERY 4 HOURS PRN
Status: DISCONTINUED | OUTPATIENT
Start: 2020-08-20 | End: 2020-08-23 | Stop reason: HOSPADM

## 2020-08-20 RX ADMIN — IPRATROPIUM BROMIDE AND ALBUTEROL SULFATE 1 AMPULE: .5; 3 SOLUTION RESPIRATORY (INHALATION) at 19:04

## 2020-08-20 RX ADMIN — POTASSIUM CHLORIDE 10 MEQ: 750 TABLET, EXTENDED RELEASE ORAL at 08:46

## 2020-08-20 RX ADMIN — DOXYCYCLINE HYCLATE 100 MG: 100 TABLET, COATED ORAL at 14:49

## 2020-08-20 RX ADMIN — ALBUTEROL SULFATE 2.5 MG: 2.5 SOLUTION RESPIRATORY (INHALATION) at 06:42

## 2020-08-20 RX ADMIN — METHYLPREDNISOLONE SODIUM SUCCINATE 40 MG: 40 INJECTION, POWDER, FOR SOLUTION INTRAMUSCULAR; INTRAVENOUS at 14:49

## 2020-08-20 RX ADMIN — ACETAMINOPHEN 650 MG: 325 TABLET ORAL at 09:12

## 2020-08-20 RX ADMIN — ENOXAPARIN SODIUM 40 MG: 100 INJECTION SUBCUTANEOUS at 08:46

## 2020-08-20 RX ADMIN — IPRATROPIUM BROMIDE AND ALBUTEROL SULFATE 1 AMPULE: .5; 3 SOLUTION RESPIRATORY (INHALATION) at 10:44

## 2020-08-20 RX ADMIN — FUROSEMIDE 40 MG: 10 INJECTION, SOLUTION INTRAMUSCULAR; INTRAVENOUS at 17:38

## 2020-08-20 RX ADMIN — TRAZODONE HYDROCHLORIDE 50 MG: 50 TABLET ORAL at 20:10

## 2020-08-20 RX ADMIN — Medication 10 ML: at 20:10

## 2020-08-20 RX ADMIN — METHYLPREDNISOLONE SODIUM SUCCINATE 40 MG: 40 INJECTION, POWDER, FOR SOLUTION INTRAMUSCULAR; INTRAVENOUS at 02:54

## 2020-08-20 RX ADMIN — DOXYCYCLINE HYCLATE 100 MG: 100 TABLET, COATED ORAL at 02:54

## 2020-08-20 RX ADMIN — IPRATROPIUM BROMIDE AND ALBUTEROL SULFATE 1 AMPULE: .5; 3 SOLUTION RESPIRATORY (INHALATION) at 15:08

## 2020-08-20 RX ADMIN — ONDANSETRON HYDROCHLORIDE 4 MG: 2 INJECTION, SOLUTION INTRAMUSCULAR; INTRAVENOUS at 10:19

## 2020-08-20 RX ADMIN — VENLAFAXINE HYDROCHLORIDE 75 MG: 75 CAPSULE, EXTENDED RELEASE ORAL at 08:46

## 2020-08-20 RX ADMIN — IPRATROPIUM BROMIDE AND ALBUTEROL SULFATE 1 AMPULE: .5; 3 SOLUTION RESPIRATORY (INHALATION) at 22:42

## 2020-08-20 RX ADMIN — FUROSEMIDE 40 MG: 10 INJECTION, SOLUTION INTRAMUSCULAR; INTRAVENOUS at 08:46

## 2020-08-20 RX ADMIN — Medication 10 ML: at 08:46

## 2020-08-20 ASSESSMENT — PAIN SCALES - GENERAL
PAINLEVEL_OUTOF10: 4
PAINLEVEL_OUTOF10: 6
PAINLEVEL_OUTOF10: 6
PAINLEVEL_OUTOF10: 0

## 2020-08-20 ASSESSMENT — PAIN DESCRIPTION - ONSET
ONSET: ON-GOING
ONSET: ON-GOING

## 2020-08-20 ASSESSMENT — PAIN DESCRIPTION - PROGRESSION
CLINICAL_PROGRESSION: GRADUALLY WORSENING

## 2020-08-20 ASSESSMENT — PAIN DESCRIPTION - LOCATION
LOCATION: HEAD
LOCATION: HEAD

## 2020-08-20 ASSESSMENT — PAIN DESCRIPTION - FREQUENCY
FREQUENCY: CONTINUOUS
FREQUENCY: CONTINUOUS

## 2020-08-20 ASSESSMENT — PAIN DESCRIPTION - PAIN TYPE
TYPE: ACUTE PAIN
TYPE: ACUTE PAIN

## 2020-08-20 ASSESSMENT — PAIN DESCRIPTION - DESCRIPTORS
DESCRIPTORS: ACHING
DESCRIPTORS: ACHING

## 2020-08-20 NOTE — PROGRESS NOTES
Pt resting with eyes closed, respirs witnessed as e/e, no signs of distress. SR up x2, bed in lowest  position, wheels locked. Call light and bedside table in easy reach.    Todd Choe RN

## 2020-08-20 NOTE — PROGRESS NOTES
C/o headache rate as 6/10. Requesting tylenol. Tylenol 650 mg PO given. See MAR. Call light in reach.

## 2020-08-20 NOTE — CARE COORDINATION
Case Management Assessment  Initial Evaluation      Patient Name: Cristobal Chatman  YOB: 1960  Diagnosis: Acute respiratory failure ([facility] [address] [J96.00]  [facility] [J44.[facility]]  Date / Time: 8/19/2020  9:17 AM    Admission status/Date:  08/19/2020  Chart Reviewed: Yes      Patient Interviewed: Yes   Family Interviewed:  No      Hospitalization in the last 30 days:  No    Primary Contact Information:   Who do you trust or have selected to make healthcare decisions for you? Name:   Joyce Bauer (spouse)  Phone  Number: [phone]  Can this person be reached and be able to respond quickly, such as within a few minutes or hours? Yes  Who would be your back-up decision maker? Name NA  Phone Number:NA  Met with: patient    Vesta Smith conducted  (bedside/phone):bedside  Current PCP: Gregorio Abreu NP    [address] required for SNF : Y, N          3 night stay required - Y, N    ADLS  Support Systems/Care Needs: Spouse/Significant Other, Children  Transportation: self    Meal Preparation: self    Housing  Living Arrangements: single story house w/sp  Steps: ramp  Intent for return to present living arrangements: Yes  Identified Issues: NA    Home Care Information  Active with Home Health Care : No Agency:(Services)  Type of Home Care Services: None  Passport/Waiver : No  :                      Phone Number:    Passport/Waiver Services: NA          Durable Medical Equiptment   DME Provider: JOSE  Equipment:   Walker__X_Cane__X_RTS___ BSC___Shower Chair___Hospital Bed___W/C____Other_pulse Ox_______  02 at ____Liter(s)---wears(frequency)_______ HHN _x__ CPAP___ BiPap___   N/A____      Home O2 Use :  No  - currently using 2 liters of O2 since admission. No home O2. Used [facility] in the past. Will folllow and re-evaluate for possible new home O2 needs.        Community Service Affiliation  Dialysis:  No    · Agency:  · Location:  · Dialysis Schedule:  · Phone:   · Fax: Other Community Services: (ex:PT/OT,Mental Health,Wound Clinic, Cardio/Pul [address])    DISCHARGE PLAN: Explained Case Management role/services. Chart reviewed. Met with pt and spouse at bedside and explained the role of the CM. Pt not on home O2 but has needed supplemental O2 since admission. No Other DCP needs identified. CM will follow and re-evaluate.

## 2020-08-20 NOTE — PLAN OF CARE
Nutrition Problem #1: Inadequate oral intake  Intervention: Food and/or Nutrient Delivery: Continue Current Diet  Nutritional Goals: patient will consume > 50% of his meals on low-Na diet order x 3 meals per day

## 2020-08-20 NOTE — PROGRESS NOTES
Returned from [facility] department in stable condition. C/o nausea. Requesting something for nausea. Zofran 4 mg IV given. See MAR. Call light in reach.

## 2020-08-20 NOTE — PROGRESS NOTES
Pulmonary Progress Note  CC: COPD, shortness of breath, swelling    Subjective:  Feels about the same     IV line peripheral    EXAM:   /78   Pulse 87   Temp 98.1 °F (36.7 °C) (Oral)   Resp 19   Ht 5' 5\" (1.651 m)   Wt 293 lb (132.9 kg)   SpO2 93%   BMI 48.76 kg/m²  on 3 L  Constitutional:  No acute distress   HEENT: no scleral icterus  Neck: No tracheal deviation present. Cardiovascular: Normal heart sounds. ++edema  Pulmonary/Chest: No wheezes. No rhonchi. No rales. No decreased breath sounds. No accessory muscle usage or stridor. Abdominal: Soft. Musculoskeletal: No cyanosis. No clubbing. Skin: Skin is warm and dry. Scheduled Meds:   ipratropium-albuterol  1 ampule Inhalation Q4H WA    potassium chloride  10 mEq Oral Daily    venlafaxine  75 mg Oral Daily with breakfast    sodium chloride flush  10 mL Intravenous 2 times per day    enoxaparin  40 mg Subcutaneous Daily    doxycycline hyclate  100 mg Oral Q12H    methylPREDNISolone  40 mg Intravenous Q12H    furosemide  40 mg Intravenous BID    nicotine  1 patch Transdermal Daily     Continuous Infusions:    PRN Meds:  acetaminophen, albuterol sulfate HFA, traZODone, sodium chloride flush, polyethylene glycol, promethazine **OR** ondansetron    Labs:  CBC:   Recent Labs     08/19/20  0930 08/20/20  0533   WBC 10.9 17.6*   HGB 15.3 15.8   HCT 46.0 49.3   MCV 90.1 89.5    206     BMP:   Recent Labs     08/19/20  0930 08/20/20  0533    135*   K 4.6 4.9    97*   CO2 32 31   BUN 21* 19   CREATININE 0.9 0.7*       Cultures:  none    Films:  8/19/20 CXR  Impression:          Cardiac silhouette appears significantly enlarged compared to prior study   suggesting a pericardial effusion.  This appearance may be exacerbated by   superimposition of soft tissues.  No definite pulmonary consolidation.       Echo 8/19/2020 EF 55 to 60%, moderate LVH, normal diastolic filling pressures    ASSESSMENT:  · Acute on chronic hypoxemic respiratory failure   · Shortness of breath  · Lower extremity edema   · Orthopnea  · Moderately severe COPD with acute exacerbation   · Abnormal chest x-ray, concern for pericardial effusion    PLAN:  · Diuresis  Supplemental oxygen to maintain SaO2 >92%; wean as tolerated    IV solumedrol with plan to convert to oral prednisone with improvement  Inhaled bronchodilators   Doxycycline D#2/5  Tobacco cessation has been achieved

## 2020-08-20 NOTE — PROGRESS NOTES
Resting in bed awake. Complaints of headache. No tylenol ordered. Page placed to MD. Am assessment complete. Alert and oriented. No needs at present time. Call light in reach. Patient is able to demonstrated the ability to move from a reclining position to an upright position within the recliner.

## 2020-08-20 NOTE — FLOWSHEET NOTE
08/20/20 1317   Encounter Summary   Services provided to: Patient   Referral/Consult From: Nurse;Rounding   Support System Children   Continue Visiting   (8/20/Patient will page  when ready to do AD)   Complexity of Encounter Moderate   Length of Encounter 30 minutes   Advance Directives (For Healthcare)   Healthcare Directive No, patient does not have an advance directive for healthcare treatment   Information on Healthcare Directives Requested Yes   Patient Requests Assistance Yes, referral made to    Advance Directives Unable to complete; Documents given;Documents explained

## 2020-08-20 NOTE — PROGRESS NOTES
doxycycline hyclate  100 mg Oral Q12H    methylPREDNISolone  40 mg Intravenous Q12H    furosemide  40 mg Intravenous BID    nicotine  1 patch Transdermal Daily       Continuous Infusions:      PRN Meds:  acetaminophen, albuterol sulfate HFA, traZODone, sodium chloride flush, polyethylene glycol, promethazine **OR** ondansetron      Data:  CBC:   Recent Labs     08/19/20  0930 08/20/20  0533   WBC 10.9 17.6*   HGB 15.3 15.8   HCT 46.0 49.3   MCV 90.1 89.5    206     BMP:   Recent Labs     08/19/20  0930 08/20/20  0533    135*   K 4.6 4.9    97*   CO2 32 31   BUN 21* 19   CREATININE 0.9 0.7*     LIVER PROFILE:   Recent Labs     08/19/20  0930   AST 21   ALT 31   BILITOT 0.6   ALKPHOS 65     PT/INR:   Recent Labs     08/19/20  1549   PROTIME 12.4   INR 1.07       CULTURES  Sputum Cx: pending   Resp Cx: pending   COVID-19: NOT DETECTED      RADIOLOGY  XR CHEST (2 VW)   Final Result   No acute acute cardiopulmonary process. XR CHEST PORTABLE   Final Result   Cardiac silhouette appears significantly enlarged compared to prior study   suggesting a pericardial effusion. This appearance may be exacerbated by   superimposition of soft tissues. No definite pulmonary consolidation. Echo:    Summary   LV systolic function is normal with EF estimated at 55-60%. Endocardium not   entirely well visualized but no obvious segmental wall motion abnormalities. There is moderate concentric left ventricular hypertrophy. Normal left ventricular diastolic filling pressure. Mild mitral annular calcification. Valves are not entirely well visualized but there is no obvious structural   abnormality or significant color flow abnormality noted on doppler. Unable to obtain SPAP due to lack of tricuspid regurgitation. Assessment/Plan:    Acute Hypoxic Respiratory Failure  - Not usually on home O2   - Now requiring 4L NC O2-->3L  - Suspect 2/2 to CHF. Pt appears fluid overloaded .    - COVID-19 TESTING: not detected   - continue diuresis. Wean oxygen as tolerated     Cardiomegaly  Concerns for Pericardial Effusion--ruled out    - CXR , portable on admission , showed significantly worsened cardiomegaly compared to prior exam, concerning for possible pericardial effusion   - Echo-->no evidence of pericardial effusion   - EKG with low voltage, but present on prior EKG as well   - hemodynamically stable   - Checked 2 view CXR today for better comparison of cardiomegaly, this is stable. Acute on Chronic dCHF  - Last Echo with normal EF and grade I DD  - appears very fluid overloaded   - started on  IV Lasix 40 mg BID, strict I&Os and daily weights   - Echo: normal EF and DD   - continue low sodium diet   - wt 263 lbs in April on an ED visit, wt on admission-->checked --> 293 lbs  -Continue diuresis with IV Lasix , net fluid deficit 3.1L.  -Continue to check daily weights, strict I's and O's    COPD AE  - COVID ruled out, okay for nebulizers   - Continue  doxycycline, IV steroids   - Pulmonology consult       OANH  - no BiPAP or CPAP until COVID ruled out-->ruled out  - Okay to resume home CPAP.     Depression   Anxiety   - Continue home medications      Insomnia  - continue Trazodone      Morbid Obesity  - Body mass index is 48.76 kg/m². - Complicating assessment and treatment. Placing patient at risk for multiple co-morbidities as well as early death and contributing to the patient's presentation.   - Counseled on weight loss.     Tobacco Abuse   - Recommend cessation   - PRN Nicotine patch/gum      DVT Prophylaxis: Lovenox   Diet: DIET LOW SODIUM 2 GM;  Code Status: Full Code       Mariel Cramer MD   8/20/2020

## 2020-08-20 NOTE — PROGRESS NOTES
Comprehensive Nutrition Assessment    Type and Reason for Visit:  Positive Nutrition Screen, Initial(+ screen for poor appetite)    Nutrition Recommendations/Plan:   1. Continue low-Na diet order. 2. Monitor appetite and po intake. 3. Monitor nutrition-related labs, bowel function, and weight trends. 4. Monitor for s/s of GI distress - patient c/o nausea today. Nutrition Assessment:  patient is nutritionally compromised AEB COPD exacerbation with ongoing symptoms x 2 weeks PTA and decreased appetite/po intake PTA and he is at risk for further compromise d/t nausea, altered nutrition-related labs, and unexplained weight gain recently; will continue low-Na diet order and monitor nutrition status    Malnutrition Assessment:  Malnutrition Status: At risk for malnutrition    Context:  Acute Illness     Findings of the 6 clinical characteristics of malnutrition:  Energy Intake:  Mild decrease in energy intake (Comment)  Weight Loss:  No significant weight loss     Body Fat Loss:  No significant body fat loss     Muscle Mass Loss:  No significant muscle mass loss    Fluid Accumulation:  7 - Moderate to Severe Extremities(BLE + 2 pitting edema)   Strength:  Not Performed    Estimated Daily Nutrient Needs:  Energy (kcal):  1330 - 1596 kcals based on 10-12 kcals/kg/CBW; Weight Used for Energy Requirements:  Current     Protein (g):  124 - 136 g protein based on 2.0-2.2 g/kg/IBW; Weight Used for Protein Requirements:  Ideal        Fluid (ml/day):  1300 - 1600 ml; Weight Used for Fluid Requirements:  Current      Nutrition Related Findings:  patient is A & O x 4; patient c/o COPD exacerbation symptoms x 2 weeks PTA; he c/o nausea today; BLE + 2 pitting edema; patient has solumedrol and lasix ordered; Na and Cl are low today      Wounds:  None       Current Nutrition Therapies:    DIET LOW SODIUM 2 GM;     Anthropometric Measures:  · Height: 5' 5\" (165.1 cm)  · Current Body Weight: 293 lb (132.9 kg)(obtained on 8/20/20)   · Admission Body Weight: 293 lb (132.9 kg)(obtained on 8/20/20)    · Usual Body Weight: (270's to 280's)     · Ideal Body Weight: 136 lbs; % Ideal Body Weight 215.4 %   · BMI: 48.8   · BMI Categories: Obese Class 3 (BMI 40.0 or greater)(48.76)       Nutrition Diagnosis:   · Inadequate oral intake related to inadequate protein-energy intake, impaired respiratory function, psychological cause or life stress as evidenced by poor intake prior to admission, lab values, nausea      Nutrition Interventions:   Food and/or Nutrient Delivery:  Continue Current Diet  Nutrition Education/Counseling:  No recommendation at this time   Coordination of Nutrition Care:  Continued Inpatient Monitoring    Goals:  patient will consume > 50% of his meals on low-Na diet order x 3 meals per day       Nutrition Monitoring and Evaluation:   Behavioral-Environmental Outcomes:  Knowledge or Skill   Food/Nutrient Intake Outcomes:  Food and Nutrient Intake  Physical Signs/Symptoms Outcomes:  Biochemical Data, Nausea or Vomiting, Meal Time Behavior, Nutrition Focused Physical Findings, Weight, Skin     Discharge Planning:     Too soon to determine     Electronically signed by Colleen Gandhi RD, LD on 8/20/20 at 3:57 PM EDT    Contact: [phone]

## 2020-08-21 LAB
ANION GAP SERPL CALCULATED.3IONS-SCNC: 4 MMOL/L (ref 3–16)
BUN BLDV-MCNC: 27 MG/DL (ref 7–20)
CALCIUM SERPL-MCNC: 9.3 MG/DL (ref 8.3–10.6)
CHLORIDE BLD-SCNC: 96 MMOL/L (ref 99–110)
CO2: 35 MMOL/L (ref 21–32)
CREAT SERPL-MCNC: 0.8 MG/DL (ref 0.9–1.3)
GFR AFRICAN AMERICAN: >60
GFR NON-AFRICAN AMERICAN: >60
GLUCOSE BLD-MCNC: 145 MG/DL (ref 70–99)
MAGNESIUM: 2.5 MG/DL (ref 1.8–2.4)
PHOSPHORUS: 3.6 MG/DL (ref 2.5–4.9)
POTASSIUM SERPL-SCNC: 4.8 MMOL/L (ref 3.5–5.1)
SARS-COV-2, NAA: NOT DETECTED
SODIUM BLD-SCNC: 135 MMOL/L (ref 136–145)

## 2020-08-21 PROCEDURE — 99232 SBSQ HOSP IP/OBS MODERATE 35: CPT | Performed by: INTERNAL MEDICINE

## 2020-08-21 PROCEDURE — 1200000000 HC SEMI PRIVATE

## 2020-08-21 PROCEDURE — 6360000002 HC RX W HCPCS: Performed by: PHYSICIAN ASSISTANT

## 2020-08-21 PROCEDURE — 6360000002 HC RX W HCPCS: Performed by: INTERNAL MEDICINE

## 2020-08-21 PROCEDURE — 80048 BASIC METABOLIC PNL TOTAL CA: CPT

## 2020-08-21 PROCEDURE — 94761 N-INVAS EAR/PLS OXIMETRY MLT: CPT

## 2020-08-21 PROCEDURE — 2580000003 HC RX 258: Performed by: PHYSICIAN ASSISTANT

## 2020-08-21 PROCEDURE — 36415 COLL VENOUS BLD VENIPUNCTURE: CPT

## 2020-08-21 PROCEDURE — 6370000000 HC RX 637 (ALT 250 FOR IP): Performed by: PHYSICIAN ASSISTANT

## 2020-08-21 PROCEDURE — 6370000000 HC RX 637 (ALT 250 FOR IP): Performed by: INTERNAL MEDICINE

## 2020-08-21 PROCEDURE — 83735 ASSAY OF MAGNESIUM: CPT

## 2020-08-21 PROCEDURE — 84100 ASSAY OF PHOSPHORUS: CPT

## 2020-08-21 PROCEDURE — 94640 AIRWAY INHALATION TREATMENT: CPT

## 2020-08-21 PROCEDURE — 2700000000 HC OXYGEN THERAPY PER DAY

## 2020-08-21 RX ORDER — PREDNISONE 20 MG/1
40 TABLET ORAL DAILY
Status: DISCONTINUED | OUTPATIENT
Start: 2020-08-22 | End: 2020-08-23 | Stop reason: HOSPADM

## 2020-08-21 RX ADMIN — TRAZODONE HYDROCHLORIDE 50 MG: 50 TABLET ORAL at 20:25

## 2020-08-21 RX ADMIN — METHYLPREDNISOLONE SODIUM SUCCINATE 40 MG: 40 INJECTION, POWDER, FOR SOLUTION INTRAMUSCULAR; INTRAVENOUS at 14:27

## 2020-08-21 RX ADMIN — FUROSEMIDE 40 MG: 10 INJECTION, SOLUTION INTRAMUSCULAR; INTRAVENOUS at 17:36

## 2020-08-21 RX ADMIN — IPRATROPIUM BROMIDE AND ALBUTEROL SULFATE 1 AMPULE: .5; 3 SOLUTION RESPIRATORY (INHALATION) at 15:07

## 2020-08-21 RX ADMIN — VENLAFAXINE HYDROCHLORIDE 75 MG: 75 CAPSULE, EXTENDED RELEASE ORAL at 08:25

## 2020-08-21 RX ADMIN — IPRATROPIUM BROMIDE AND ALBUTEROL SULFATE 1 AMPULE: .5; 3 SOLUTION RESPIRATORY (INHALATION) at 10:51

## 2020-08-21 RX ADMIN — METHYLPREDNISOLONE SODIUM SUCCINATE 40 MG: 40 INJECTION, POWDER, FOR SOLUTION INTRAMUSCULAR; INTRAVENOUS at 02:19

## 2020-08-21 RX ADMIN — IPRATROPIUM BROMIDE AND ALBUTEROL SULFATE 1 AMPULE: .5; 3 SOLUTION RESPIRATORY (INHALATION) at 19:20

## 2020-08-21 RX ADMIN — Medication 10 ML: at 14:27

## 2020-08-21 RX ADMIN — ENOXAPARIN SODIUM 40 MG: 100 INJECTION SUBCUTANEOUS at 08:26

## 2020-08-21 RX ADMIN — IPRATROPIUM BROMIDE AND ALBUTEROL SULFATE 1 AMPULE: .5; 3 SOLUTION RESPIRATORY (INHALATION) at 06:52

## 2020-08-21 RX ADMIN — DOXYCYCLINE HYCLATE 100 MG: 100 TABLET, COATED ORAL at 14:27

## 2020-08-21 RX ADMIN — DOXYCYCLINE HYCLATE 100 MG: 100 TABLET, COATED ORAL at 02:19

## 2020-08-21 RX ADMIN — POTASSIUM CHLORIDE 10 MEQ: 750 TABLET, EXTENDED RELEASE ORAL at 08:25

## 2020-08-21 RX ADMIN — Medication 10 ML: at 20:25

## 2020-08-21 RX ADMIN — Medication 10 ML: at 08:26

## 2020-08-21 RX ADMIN — Medication 10 ML: at 17:37

## 2020-08-21 RX ADMIN — IPRATROPIUM BROMIDE AND ALBUTEROL SULFATE 1 AMPULE: .5; 3 SOLUTION RESPIRATORY (INHALATION) at 23:21

## 2020-08-21 RX ADMIN — FUROSEMIDE 40 MG: 10 INJECTION, SOLUTION INTRAMUSCULAR; INTRAVENOUS at 08:25

## 2020-08-21 NOTE — CARE COORDINATION
INTERDISCIPLINARY PLAN OF CARE CONFERENCE    Date/Time: 8/21/2020 10:32 AM  Completed by: Filiberto Grant      Patient Name:  Thien Wall  YOB: 1960  Admitting Diagnosis: Acute respiratory failure ([facility] [address] [J96.00]  COPD with acute exacerbation ([facility] [address] [J44.1]     Admit Date/Time:  8/19/2020  9:17 AM    Chart reviewed. Interdisciplinary team contacted or reviewed plan related to patient progress and discharge plans. Disciplines included Case Management, Nursing, and Dietitian. Current Status:O2 @ 3lpm  PT/OT recommendation for discharge plan of care: N/A    Expected D/C Disposition:  Home  Confirmed plan with patient and/or family Yes confirmed with: (pt)     Discharge Plan Comments: reviewed chart. Pt cont to plan to return home at discharge. Pt states that he is IPTA and denies needs. Will follow for poss home O2 needs.     Home O2 in place on admit: No  Pt informed of need to bring portable home O2 tank on day of discharge for nursing to connect prior to leaving:  Not Indicated  Verbalized agreement/Understanding:  Not Indicated

## 2020-08-21 NOTE — PROGRESS NOTES
Progress Note    Admit Date:  8/19/2020     Presented with shortness of breath, patient in acute CHF  Being diuresed. COVID ruled out. Procalcitonin negative      Subjective:  Mr. Judi Masterson  is feeling much better today. He continues to diurese well. Negative fluid balance of 6.7 L now. His weight is down from 293 pounds to 288 pounds today. Shortness of breath is improved. Cough is decreased. No fevers. Echo was negative for pericardial effusion. Chest x-ray repeated  On 8/20-  PA and lateral, and this does not show any cardiomegaly. O2 requirement down from 4L -->3 L--> 2L today       Objective:   Patient Vitals for the past 4 hrs:   Resp SpO2   08/21/20 1507 18 92 %        Intake/Output Summary (Last 24 hours) at 8/21/2020 1741  Last data filed at 8/21/2020 1155  Gross per 24 hour   Intake 1020 ml   Output 3450 ml   Net -2430 ml       Physical Exam:  Gen: Obese, No distress. Alert. Awake, oriented X 3  Eyes: PERRL. No sclera icterus. No conjunctival injection. ENT: No discharge. Pharynx clear. Neck: No JVD. No Carotid Bruit. Trachea midline. Resp: No accessory muscle use. Diminished breath sounds . no crackles. No wheezes. No rhonchi. CV: Regular rate. Regular rhythm. No murmur. No rub. LE edema down to 1 +  Capillary Refill: Brisk,< 3 seconds   Peripheral Pulses: +2 palpable, equal bilaterally   GI: Non-tender. Non-distended. No masses. No organomegaly. Normal bowel sounds. No hernia. Skin: Warm and dry. No nodule on exposed extremities. No rash on exposed extremities. M/S: No cyanosis. No joint deformity. No clubbing. norma LE  edema 1+   Neuro: Awake. Alert,  nonfocal    Psych: Oriented x 3. No anxiety or agitation.      Scheduled Meds:   [START ON 8/22/2020] predniSONE  40 mg Oral Daily    ipratropium-albuterol  1 ampule Inhalation Q4H WA    potassium chloride  10 mEq Oral Daily    venlafaxine  75 mg Oral Daily with breakfast    sodium chloride flush  10 mL Intravenous 2 times per day    enoxaparin  40 mg Subcutaneous Daily    doxycycline hyclate  100 mg Oral Q12H    furosemide  40 mg Intravenous BID    nicotine  1 patch Transdermal Daily       Continuous Infusions:      PRN Meds:  acetaminophen, albuterol sulfate HFA, traZODone, sodium chloride flush, polyethylene glycol, promethazine **OR** ondansetron      Data:  CBC:   Recent Labs     08/19/20  0930 08/20/20  0533   WBC 10.9 17.6*   HGB 15.3 15.8   HCT 46.0 49.3   MCV 90.1 89.5    206     BMP:   Recent Labs     08/19/20  0930 08/20/20  0533 08/21/20  0524    135* 135*   K 4.6 4.9 4.8    97* 96*   CO2 32 31 35*   PHOS  --   --  3.6   BUN 21* 19 27*   CREATININE 0.9 0.7* 0.8*     LIVER PROFILE:   Recent Labs     08/19/20  0930   AST 21   ALT 31   BILITOT 0.6   ALKPHOS 65     PT/INR:   Recent Labs     08/19/20  1549   PROTIME 12.4   INR 1.07       CULTURES  Sputum Cx: pending   Resp Cx: pending   COVID-19: NOT DETECTED      RADIOLOGY  XR CHEST (2 VW)   Final Result   No acute acute cardiopulmonary process. XR CHEST PORTABLE   Final Result   Cardiac silhouette appears significantly enlarged compared to prior study   suggesting a pericardial effusion. This appearance may be exacerbated by   superimposition of soft tissues. No definite pulmonary consolidation. Echo:    Summary   LV systolic function is normal with EF estimated at 55-60%. Endocardium not   entirely well visualized but no obvious segmental wall motion abnormalities. There is moderate concentric left ventricular hypertrophy. Normal left ventricular diastolic filling pressure. Mild mitral annular calcification. Valves are not entirely well visualized but there is no obvious structural   abnormality or significant color flow abnormality noted on doppler. Unable to obtain SPAP due to lack of tricuspid regurgitation.           Assessment/Plan:    Acute Hypoxic Respiratory Failure  - Not usually on home O2 - was  requiring 4L NC O2--> wean oxygen as tolerated --> 3L--> 2L today  - Suspect 2/2 to CHF. Pt was  fluid overloaded/diffuse edema on presentation.   - COVID-19 TESTING: not detected   - continue diuresis. Wean oxygen as tolerated     Cardiomegaly  Concerns for Pericardial Effusion--ruled out    - CXR , portable on admission , showed significantly worsened cardiomegaly compared to prior exam, concerning for possible pericardial effusion   - Echo-->no evidence of pericardial effusion   - EKG with low voltage, but present on prior EKG as well   - hemodynamically stable   - Checked 2 view CXR next day for better comparison of cardiomegaly, this is stable. Acute on Chronic dCHF  - Last Echo with normal EF and grade I DD. Echo done this admission shows normal EF.  - appears very fluid overloaded . Diffuse edema  - started on  IV Lasix 40 mg BID, strict I&Os and daily weights   - continue low sodium diet   - wt 263 lbs in April on an ED visit. wt on admission-->checked --> 293 lbs. Weight down to 288 pounds today  -Continue diuresis with IV Lasix , net fluid deficit 6.7L. -Continue to check daily weights, strict I's and O's    COPD AE  - COVID ruled out, okay for nebulizers   - Continue  Doxycycline  - on  IV steroids ->  Taper, switch to oral prednisone  - Pulmonology consult       OANH  - no BiPAP or CPAP until COVID ruled out-->ruled out  - Okay to resume home CPAP.     Depression   Anxiety   - Continue home medications      Insomnia  - continue Trazodone      Morbid Obesity  - Body mass index is 48.01 kg/m². - Complicating assessment and treatment. Placing patient at risk for multiple co-morbidities as well as early death and contributing to the patient's presentation.   - Counseled on weight loss.     Tobacco Abuse   - Recommend cessation   - PRN Nicotine patch/gum    Steroid-induced leukocytosis  -Decrease steroids,.  -Monitor white blood count      DVT Prophylaxis: Lovenox   Diet: DIET LOW SODIUM 2 GM;  Code Status: Full Code    Discussed with patient, plan on continued IV diuresis for the next couple of days, monitor daily weights, monitor renal function.        Ashish Shen MD   8/21/2020

## 2020-08-21 NOTE — PROGRESS NOTES
Am assessment completed. See flowsheet. Pt a/o, tearful at this time, pt just received news of brother's death. Pt denies needs at this time. Call light within reach. lAbina Ronquillo RN\

## 2020-08-21 NOTE — PLAN OF CARE
Problem: Falls - Risk of:  Goal: Will remain free from falls  Description: Will remain free from falls  Outcome: Ongoing  Goal: Absence of physical injury  Description: Absence of physical injury  Outcome: Ongoing     Problem: Nutrition  Goal: Optimal nutrition therapy  Outcome: Ongoing  Goal: Understanding of nutritional guidelines  Outcome: Ongoing

## 2020-08-21 NOTE — PLAN OF CARE
Problem: Infection:  Goal: Will remain free from infection  Description: Will remain free from infection  Outcome: Ongoing     Problem: Safety:  Goal: Free from accidental physical injury  Description: Free from accidental physical injury  Outcome: Ongoing     Problem: Safety:  Goal: Free from intentional harm  Description: Free from intentional harm  Outcome: Ongoing     Problem: Daily Care:  Goal: Daily care needs are met  Description: Daily care needs are met  Outcome: Ongoing     Problem: Discharge Planning:  Goal: Patients continuum of care needs are met  Description: Patients continuum of care needs are met  Outcome: Ongoing

## 2020-08-21 NOTE — PROGRESS NOTES
[facility]  [place]pl[place] Scales

## 2020-08-21 NOTE — PROGRESS NOTES
Pulmonary Progress Note  CC: COPD, shortness of breath, swelling    Subjective:  Feels better, less swollen    IV line peripheral    EXAM:   /68   Pulse 79   Temp 97.1 °F (36.2 °C) (Oral)   Resp 18   Ht 5' 5\" (1.651 m)   Wt 288 lb 8 oz (130.9 kg)   SpO2 94%   BMI 48.01 kg/m²  on 2 L  Constitutional:  No acute distress   HEENT: no scleral icterus  Neck: No tracheal deviation present. Cardiovascular: Normal heart sounds. +edema  Pulmonary/Chest: No wheezes. No rhonchi. No rales. No decreased breath sounds. No accessory muscle usage or stridor. Abdominal: Soft. Musculoskeletal: No cyanosis. No clubbing. Skin: Skin is warm and dry. Scheduled Meds:   ipratropium-albuterol  1 ampule Inhalation Q4H WA    potassium chloride  10 mEq Oral Daily    venlafaxine  75 mg Oral Daily with breakfast    sodium chloride flush  10 mL Intravenous 2 times per day    enoxaparin  40 mg Subcutaneous Daily    doxycycline hyclate  100 mg Oral Q12H    methylPREDNISolone  40 mg Intravenous Q12H    furosemide  40 mg Intravenous BID    nicotine  1 patch Transdermal Daily     Continuous Infusions:    PRN Meds:  acetaminophen, albuterol sulfate HFA, traZODone, sodium chloride flush, polyethylene glycol, promethazine **OR** ondansetron    Labs:  CBC:   Recent Labs     08/19/20  0930 08/20/20  0533   WBC 10.9 17.6*   HGB 15.3 15.8   HCT 46.0 49.3   MCV 90.1 89.5    206     BMP:   Recent Labs     08/19/20  0930 08/20/20  0533 08/21/20  0524    135* 135*   K 4.6 4.9 4.8    97* 96*   CO2 32 31 35*   PHOS  --   --  3.6   BUN 21* 19 27*   CREATININE 0.9 0.7* 0.8*       Cultures:  none    Films:  8/20/2020  Heart appears normal in size.  No focal consolidation, pneumothorax, pleural   effusion or free air.  Osseous structures demonstrate degenerative change.      Echo 8/19/2020 EF 55 to 60%, moderate LVH, normal diastolic filling pressures    ASSESSMENT:  · Moderately severe COPD with acute exacerbation · Acute on chronic hypoxemic respiratory failure   · Lower extremity edema   · Orthopnea    PLAN:  · Diuresis  Supplemental oxygen to maintain SaO2 >92%; wean as tolerated    Prednisone taper  Inhaled bronchodilators   Doxycycline D#3/5  Tobacco cessation has been achieved  Okay with me for home tomorrow if improving. He will need to have a set up plan for managing fluid status/weight.

## 2020-08-22 LAB
ANION GAP SERPL CALCULATED.3IONS-SCNC: 10 MMOL/L (ref 3–16)
BUN BLDV-MCNC: 26 MG/DL (ref 7–20)
CALCIUM SERPL-MCNC: 9.2 MG/DL (ref 8.3–10.6)
CHLORIDE BLD-SCNC: 96 MMOL/L (ref 99–110)
CO2: 31 MMOL/L (ref 21–32)
CREAT SERPL-MCNC: 0.8 MG/DL (ref 0.9–1.3)
GFR AFRICAN AMERICAN: >60
GFR NON-AFRICAN AMERICAN: >60
GLUCOSE BLD-MCNC: 124 MG/DL (ref 70–99)
HCT VFR BLD CALC: 52.4 % (ref 40.5–52.5)
HEMOGLOBIN: 17.1 G/DL (ref 13.5–17.5)
MCH RBC QN AUTO: 29.6 PG (ref 26–34)
MCHC RBC AUTO-ENTMCNC: 32.6 G/DL (ref 31–36)
MCV RBC AUTO: 90.8 FL (ref 80–100)
PDW BLD-RTO: 14.6 % (ref 12.4–15.4)
PLATELET # BLD: 210 K/UL (ref 135–450)
PMV BLD AUTO: 8.3 FL (ref 5–10.5)
POTASSIUM SERPL-SCNC: 4.1 MMOL/L (ref 3.5–5.1)
RBC # BLD: 5.77 M/UL (ref 4.2–5.9)
SODIUM BLD-SCNC: 137 MMOL/L (ref 136–145)
WBC # BLD: 17.6 K/UL (ref 4–11)

## 2020-08-22 PROCEDURE — 6370000000 HC RX 637 (ALT 250 FOR IP): Performed by: INTERNAL MEDICINE

## 2020-08-22 PROCEDURE — 6370000000 HC RX 637 (ALT 250 FOR IP): Performed by: PHYSICIAN ASSISTANT

## 2020-08-22 PROCEDURE — 2700000000 HC OXYGEN THERAPY PER DAY

## 2020-08-22 PROCEDURE — 94640 AIRWAY INHALATION TREATMENT: CPT

## 2020-08-22 PROCEDURE — 6360000002 HC RX W HCPCS: Performed by: INTERNAL MEDICINE

## 2020-08-22 PROCEDURE — 36415 COLL VENOUS BLD VENIPUNCTURE: CPT

## 2020-08-22 PROCEDURE — 6360000002 HC RX W HCPCS: Performed by: PHYSICIAN ASSISTANT

## 2020-08-22 PROCEDURE — 85027 COMPLETE CBC AUTOMATED: CPT

## 2020-08-22 PROCEDURE — 94761 N-INVAS EAR/PLS OXIMETRY MLT: CPT

## 2020-08-22 PROCEDURE — 99232 SBSQ HOSP IP/OBS MODERATE 35: CPT | Performed by: INTERNAL MEDICINE

## 2020-08-22 PROCEDURE — 2580000003 HC RX 258: Performed by: PHYSICIAN ASSISTANT

## 2020-08-22 PROCEDURE — 80048 BASIC METABOLIC PNL TOTAL CA: CPT

## 2020-08-22 PROCEDURE — 1200000000 HC SEMI PRIVATE

## 2020-08-22 RX ORDER — CHOLECALCIFEROL (VITAMIN D3) 125 MCG
5 CAPSULE ORAL ONCE
Status: COMPLETED | OUTPATIENT
Start: 2020-08-22 | End: 2020-08-22

## 2020-08-22 RX ADMIN — Medication 5 MG: at 22:10

## 2020-08-22 RX ADMIN — POTASSIUM CHLORIDE 10 MEQ: 750 TABLET, EXTENDED RELEASE ORAL at 08:11

## 2020-08-22 RX ADMIN — ACETAMINOPHEN 650 MG: 325 TABLET ORAL at 09:37

## 2020-08-22 RX ADMIN — PREDNISONE 40 MG: 20 TABLET ORAL at 08:11

## 2020-08-22 RX ADMIN — DOXYCYCLINE HYCLATE 100 MG: 100 TABLET, COATED ORAL at 01:56

## 2020-08-22 RX ADMIN — Medication 10 ML: at 08:19

## 2020-08-22 RX ADMIN — DOXYCYCLINE HYCLATE 100 MG: 100 TABLET, COATED ORAL at 13:50

## 2020-08-22 RX ADMIN — IPRATROPIUM BROMIDE AND ALBUTEROL SULFATE 1 AMPULE: .5; 3 SOLUTION RESPIRATORY (INHALATION) at 06:53

## 2020-08-22 RX ADMIN — FUROSEMIDE 40 MG: 10 INJECTION, SOLUTION INTRAMUSCULAR; INTRAVENOUS at 18:00

## 2020-08-22 RX ADMIN — ENOXAPARIN SODIUM 40 MG: 100 INJECTION SUBCUTANEOUS at 08:12

## 2020-08-22 RX ADMIN — ACETAMINOPHEN 650 MG: 325 TABLET ORAL at 13:50

## 2020-08-22 RX ADMIN — IPRATROPIUM BROMIDE AND ALBUTEROL SULFATE 1 AMPULE: .5; 3 SOLUTION RESPIRATORY (INHALATION) at 19:02

## 2020-08-22 RX ADMIN — TRAZODONE HYDROCHLORIDE 50 MG: 50 TABLET ORAL at 20:04

## 2020-08-22 RX ADMIN — VENLAFAXINE HYDROCHLORIDE 75 MG: 75 CAPSULE, EXTENDED RELEASE ORAL at 08:11

## 2020-08-22 RX ADMIN — FUROSEMIDE 40 MG: 10 INJECTION, SOLUTION INTRAMUSCULAR; INTRAVENOUS at 08:11

## 2020-08-22 RX ADMIN — Medication 10 ML: at 20:04

## 2020-08-22 RX ADMIN — IPRATROPIUM BROMIDE AND ALBUTEROL SULFATE 1 AMPULE: .5; 3 SOLUTION RESPIRATORY (INHALATION) at 15:03

## 2020-08-22 ASSESSMENT — PAIN DESCRIPTION - LOCATION
LOCATION: NECK
LOCATION: HEAD

## 2020-08-22 ASSESSMENT — PAIN SCALES - GENERAL
PAINLEVEL_OUTOF10: 3
PAINLEVEL_OUTOF10: 5
PAINLEVEL_OUTOF10: 0
PAINLEVEL_OUTOF10: 0

## 2020-08-22 ASSESSMENT — PAIN DESCRIPTION - PAIN TYPE: TYPE: ACUTE PAIN

## 2020-08-22 NOTE — PLAN OF CARE
Problem: Falls - Risk of:  Goal: Will remain free from falls  Description: Will remain free from falls  8/21/2020 2227 by Royal Rosemarie RN  Outcome: Ongoing  8/21/2020 1929 by Jak Liao RN  Outcome: Ongoing  Goal: Absence of physical injury  Description: Absence of physical injury  8/21/2020 1929 by Jak Liao RN  Outcome: Ongoing     Problem: Nutrition  Goal: Optimal nutrition therapy  8/21/2020 1929 by Jak Liao RN  Outcome: Ongoing  Goal: Understanding of nutritional guidelines  8/21/2020 1929 by Jak Liao RN  Outcome: Ongoing     Problem: Infection:  Goal: Will remain free from infection  Description: Will remain free from infection  8/21/2020 1930 by Jak Liao RN  Outcome: Ongoing     Problem: Safety:  Goal: Free from accidental physical injury  Description: Free from accidental physical injury  8/21/2020 1930 by Jak Liao RN  Outcome: Ongoing  Goal: Free from intentional harm  Description: Free from intentional harm  8/21/2020 1930 by Jak Liao RN  Outcome: Ongoing     Problem: Daily Care:  Goal: Daily care needs are met  Description: Daily care needs are met  8/21/2020 2227 by Royal Rosemarie RN  Outcome: Ongoing  8/21/2020 1930 by Jak Liao RN  Outcome: Ongoing     Problem: Discharge Planning:  Goal: Patients continuum of care needs are met  Description: Patients continuum of care needs are met  8/21/2020 2227 by Royal Rosemarie RN  Outcome: Ongoing  8/21/2020 1930 by Jak Liao RN  Outcome: Ongoing     Problem: OXYGENATION/RESPIRATORY FUNCTION  Goal: Patient will maintain patent airway  Description: Patient's EF (Ejection Fraction) is greater than 40%    Patient's weights and intake/output reviewed:    Patient's Last Weight:  288lbs obtained by bed scale. Difference of 5 lbs less than last documented weight.     Intake/Output Summary (Last 24 hours) at 08/21/20 1934  Last data filed at 08/21/20 1822          Gross per 24 hour  Intake:             1500 ml  Output: 3350 ml  Net   :            -1850 ml      Pt is currently on 3 L O2. Pt denies shortness of breath. Pt with pitting lower extremity edema. Patient and/or Family's stated Goal of Care this Admission: reduce shortness of breath, increase activity tolerance, better understand heart failure and disease management, be more comfortable, reduce lower extremity edema, and unable to assess, will attempt again prior to discharge      Comorbidities Reviewed Yes  Patient has a past medical history of Anxiety, Chronic obstructive pulmonary disease ([address] Crush injury of right foot, DDD (degenerative disc disease), lumbar, Erectile dysfunction, Fatigue, History of alcohol abuse, History of drug use, HNP (herniated nucleus pulposus), lumbar, Hyperglycemia, Hypersomnia, Kidney stone, Lumbar radiculopathy, Lumbar spine pain, Observed sleep apnea, Reactive depression, Spondylosis without myelopathy or radiculopathy, lumbar region, and Tobacco use. >>For CHF and Comorbidity documentation on Education Time and Topics, please see Education Tab      8/21/2020 2227 by Mehdi Gonzalez RN  Outcome: Ongoing  8/21/2020 1938 by Radha Courtney RN  Outcome: Ongoing  Goal: Patient will achieve/maintain normal respiratory rate/effort  Description: Respiratory rate and effort will be within normal limits for the patient  8/21/2020 1938 by Radha Courtney RN  Outcome: Ongoing     Problem: HEMODYNAMIC STATUS  Goal: Patient has stable vital signs and fluid balance  8/21/2020 1938 by Radha Courtney RN  Outcome: Ongoing     Problem: FLUID AND ELECTROLYTE IMBALANCE  Goal: Fluid and electrolyte balance are achieved/maintained  8/21/2020 1938 by Radha Courtney RN  Outcome: Ongoing     Problem: ACTIVITY INTOLERANCE/IMPAIRED MOBILITY  Goal: Mobility/activity is maintained at optimum level for patient  8/21/2020 1938 by Radha Courtney RN  Outcome: Ongoing     Problem:  Activity Intolerance:  Goal: Ability to tolerate increased activity will improve  Description: Ability to tolerate increased activity will improve  Outcome: Ongoing     Problem: Airway Clearance - Ineffective:  Goal: Ability to maintain a clear airway will improve  Description: Ability to maintain a clear airway will improve  Outcome: Ongoing     Problem: Breathing Pattern - Ineffective:  Goal: Ability to achieve and maintain a regular respiratory rate will improve  Description: Ability to achieve and maintain a regular respiratory rate will improve  Outcome: Ongoing

## 2020-08-22 NOTE — FLOWSHEET NOTE
08/21/20 2020   Vital Signs   Temp 98.5 °F (36.9 °C)   Temp Source Oral   Pulse 92   Heart Rate Source Monitor   Resp 18   /76   BP Location Right lower arm   BP Upper/Lower Lower   Patient Position Semi fowlers   Level of Consciousness 0   MEWS Score 1   Oxygen Therapy   SpO2 93 %   O2 Device Nasal cannula   O2 Flow Rate (L/min) 2 L/min   Pt A/O x 4 assessment completed. Pt SOB with just speaking but states he feels much better, PM meds given. Pt denies any needs.  Call light within reach

## 2020-08-22 NOTE — PROGRESS NOTES
Pulmonary Progress Note  CC: COPD, shortness of breath, swelling    Subjective: Feels worse today    IV line peripheral    EXAM:   /84   Pulse 88   Temp 97.2 °F (36.2 °C) (Oral)   Resp 21   Ht 5' 5\" (1.651 m)   Wt 287 lb 3.2 oz (130.3 kg)   SpO2 96%   BMI 47.79 kg/m²  on 2 L  Constitutional:  No acute distress, looks about the same  HEENT: no scleral icterus  Neck: No tracheal deviation present. Cardiovascular: Normal heart sounds. +edema  Pulmonary/Chest: No wheezes. No rhonchi. No rales. No decreased breath sounds. No accessory muscle usage or stridor. Abdominal: Soft. Musculoskeletal: No cyanosis. No clubbing. Skin: Skin is warm and dry. Scheduled Meds:   predniSONE  40 mg Oral Daily    ipratropium-albuterol  1 ampule Inhalation Q4H WA    potassium chloride  10 mEq Oral Daily    venlafaxine  75 mg Oral Daily with breakfast    sodium chloride flush  10 mL Intravenous 2 times per day    enoxaparin  40 mg Subcutaneous Daily    doxycycline hyclate  100 mg Oral Q12H    furosemide  40 mg Intravenous BID    nicotine  1 patch Transdermal Daily     Continuous Infusions:    PRN Meds:  acetaminophen, albuterol sulfate HFA, traZODone, sodium chloride flush, polyethylene glycol, promethazine **OR** ondansetron    Labs:  CBC:   Recent Labs     08/19/20  0930 08/20/20  0533 08/22/20  0600   WBC 10.9 17.6* 17.6*   HGB 15.3 15.8 17.1   HCT 46.0 49.3 52.4   MCV 90.1 89.5 90.8    206 210     BMP:   Recent Labs     08/20/20  0533 08/21/20  0524 08/22/20  0600   * 135* 137   K 4.9 4.8 4.1   CL 97* 96* 96*   CO2 31 35* 31   PHOS  --  3.6  --    BUN 19 27* 26*   CREATININE 0.7* 0.8* 0.8*       Cultures:  none    Films:  8/20/2020  Heart appears normal in size.  No focal consolidation, pneumothorax, pleural   effusion or free air.  Osseous structures demonstrate degenerative change.      Echo 8/19/2020 EF 55 to 60%, moderate LVH, normal diastolic filling pressures    ASSESSMENT:  · Moderately severe COPD with acute exacerbation   · Acute on chronic hypoxemic respiratory failure   · Lower extremity edema   · Orthopnea  · RLL Pulmonary Nodule     PLAN:  · Diuresis per internal medicine  Supplemental oxygen to maintain SaO2 >92%; wean as tolerated    Prednisone taper  Inhaled bronchodilators   Doxycycline D#4/5  Tobacco cessation has been achieved

## 2020-08-22 NOTE — PROGRESS NOTES
ondansetron      Data:  CBC:   Recent Labs     08/20/20  0533 08/22/20  0600   WBC 17.6* 17.6*   HGB 15.8 17.1   HCT 49.3 52.4   MCV 89.5 90.8    210     BMP:   Recent Labs     08/20/20  0533 08/21/20  0524 08/22/20  0600   * 135* 137   K 4.9 4.8 4.1   CL 97* 96* 96*   CO2 31 35* 31   PHOS  --  3.6  --    BUN 19 27* 26*   CREATININE 0.7* 0.8* 0.8*     LIVER PROFILE:   No results for input(s): AST, ALT, LIPASE, BILIDIR, BILITOT, ALKPHOS in the last 72 hours. Invalid input(s): AMYLASE,  ALB  PT/INR:   Recent Labs     08/19/20  1549   PROTIME 12.4   INR 1.07       CULTURES  Sputum Cx: pending   Resp Cx: pending   COVID-19: NOT DETECTED      RADIOLOGY  XR CHEST (2 VW)   Final Result   No acute acute cardiopulmonary process. XR CHEST PORTABLE   Final Result   Cardiac silhouette appears significantly enlarged compared to prior study   suggesting a pericardial effusion. This appearance may be exacerbated by   superimposition of soft tissues. No definite pulmonary consolidation. Echo:    Summary   LV systolic function is normal with EF estimated at 55-60%. Endocardium not   entirely well visualized but no obvious segmental wall motion abnormalities. There is moderate concentric left ventricular hypertrophy. Normal left ventricular diastolic filling pressure. Mild mitral annular calcification. Valves are not entirely well visualized but there is no obvious structural   abnormality or significant color flow abnormality noted on doppler. Unable to obtain SPAP due to lack of tricuspid regurgitation. Assessment/Plan:      Acute Hypoxic Respiratory Failure  - baseline RA  - was  requiring 4L NC O2--> wean oxygen as tolerated --> 3L--> 2L today  - Suspect 2/2 to CHF. Pt was  fluid overloaded/diffuse edema on presentation and ongoing - improved. - COVID-19 negative  - continue diuresis.   Wean oxygen as tolerated       Cardiomegaly  Concerns for Pericardial Effusion--ruled out    - CXR , portable on admission , showed significantly worsened cardiomegaly compared to prior exam, concerning for possible pericardial effusion   - ECHO-->no evidence of pericardial effusion   - EKG with low voltage, but present on prior EKG as well   - hemodynamically stable   - Checked 2 view CXR next day for better comparison of cardiomegaly, this is stable. Acute on Chronic dCHF  - Last Echo with normal EF and grade I DD. Echo done this admission shows normal EF.  - appears very fluid overloaded . Diffuse edema  - started on  IV Lasix 40 mg BID, strict I&Os and daily weights   - continue low sodium diet   - wt 263 lbs in April on an ED visit. wt on admission-->checked --> 293 lbs. Weight down to 288 pounds today  -Continue diuresis with IV Lasix  -Continue to check daily weights, strict I's and O's      COPD AE  - COVID ruled out, okay for nebulizers   - Continue  Doxycycline  - on  IV steroids ->  Taper, switch to oral prednisone  - Pulmonology consult         OANH  - no BiPAP or CPAP until COVID ruled out-->ruled out  - Okay to resume home CPAP.       Depression   Anxiety   - Continue home medications        Insomnia  - continue Trazodone        Morbid Obesity  - Body mass index is 47.79 kg/m². - Complicating assessment and treatment. Placing patient at risk for multiple co-morbidities as well as early death and contributing to the patient's presentation.   - Counseled on weight loss.       Tobacco Abuse   - Recommend cessation   - PRN Nicotine patch/gum      Steroid-induced leukocytosis  -Decrease steroids,.  -Monitor white blood count        DVT Prophylaxis: Lovenox   Diet: DIET LOW SODIUM 2 GM;  Code Status: Full Code      Dispo: ccQue Leija MD   8/22/2020

## 2020-08-22 NOTE — PROGRESS NOTES
Report given to CAMILO Rivas. Sitting up in bed - working with respiratory. A/O x4. Lights off, TV on. Bed in lowest position. Call light within reach. Denies further needs at this time.

## 2020-08-22 NOTE — PROGRESS NOTES
Bedside report given to [facility]  pt in stable condition no needs at this time.  Call light within reach

## 2020-08-22 NOTE — PLAN OF CARE
Problem: Falls - Risk of:  Goal: Will remain free from falls  Description: Will remain free from falls  8/22/2020 0742 by Arpita Borja RN  Outcome: Ongoing  8/21/2020 2227 by James Burton RN  Outcome: Ongoing  8/21/2020 1929 by Erlin Garcia RN  Outcome: Ongoing  Goal: Absence of physical injury  Description: Absence of physical injury  8/22/2020 0742 by Arpita Borja RN  Outcome: Ongoing  8/21/2020 1929 by Erlin Garcia RN  Outcome: Ongoing     Problem: Nutrition  Goal: Optimal nutrition therapy  8/22/2020 0742 by Arpita Broja RN  Outcome: Ongoing  8/21/2020 1929 by Erlin Garcia RN  Outcome: Ongoing  Goal: Understanding of nutritional guidelines  8/22/2020 0742 by Arpita Borja RN  Outcome: Ongoing  8/21/2020 1929 by Erlin Garcia RN  Outcome: Ongoing     Problem: Infection:  Goal: Will remain free from infection  Description: Will remain free from infection  8/22/2020 0742 by Arpita Borja RN  Outcome: Ongoing  8/21/2020 1930 by Erlin Garcia RN  Outcome: Ongoing     Problem: Safety:  Goal: Free from accidental physical injury  Description: Free from accidental physical injury  8/22/2020 [address] by Arpita Borja RN  Outcome: Ongoing  8/21/2020 1930 by Erlin Garcia RN  Outcome: Ongoing  Goal: Free from intentional harm  Description: Free from intentional harm  8/22/2020 0742 by Arpita Borja RN  Outcome: Ongoing  8/21/2020 1930 by Erlin Garcia RN  Outcome: Ongoing     Problem: Daily Care:  Goal: Daily care needs are met  Description: Daily care needs are met  8/22/2020 4328 by Arpita Borja RN  Outcome: Ongoing  8/21/2020 2227 by James Burton RN  Outcome: Ongoing  8/21/2020 1930 by Erlin Garcia RN  Outcome: Ongoing     Problem: Discharge Planning:  Goal: Patients continuum of care needs are met  Description: Patients continuum of care needs are met  8/22/2020 0742 by Arpita Borja RN  Outcome: Ongoing  8/21/2020 2227 by Uma Gonzalez Jaelyn Boogie RN  Outcome: Ongoing  8/21/2020 1930 by Hossein Shah RN  Outcome: Ongoing     Problem: OXYGENATION/RESPIRATORY FUNCTION  Goal: Patient will maintain patent airway  Description: Patient's EF (Ejection Fraction) is greater than 40%    Patient's weights and intake/output reviewed:    Patient's Last Weight:  288lbs obtained by bed scale. Difference of 5 lbs less than last documented weight. Intake/Output Summary (Last 24 hours) at 08/21/20 1934  Last data filed at 08/21/20 1822          Gross per 24 hour  Intake:             1500 ml  Output:             3350 ml  Net   :            -1850 ml      Pt is currently on 3 L O2. Pt denies shortness of breath. Pt with pitting lower extremity edema. Patient and/or Family's stated Goal of Care this Admission: reduce shortness of breath, increase activity tolerance, better understand heart failure and disease management, be more comfortable, reduce lower extremity edema, and unable to assess, will attempt again prior to discharge      Comorbidities Reviewed Yes  Patient has a past medical history of Anxiety, Chronic obstructive pulmonary disease ([address] Crush injury of right foot, DDD (degenerative disc disease), lumbar, Erectile dysfunction, Fatigue, History of alcohol abuse, History of drug use, HNP (herniated nucleus pulposus), lumbar, Hyperglycemia, Hypersomnia, Kidney stone, Lumbar radiculopathy, Lumbar spine pain, Observed sleep apnea, Reactive depression, Spondylosis without myelopathy or radiculopathy, lumbar region, and Tobacco use.          >>For CHF and Comorbidity documentation on Education Time and Topics, please see Education Tab      8/22/2020 0742 by Hieu Reilly RN  Outcome: Ongoing  8/21/2020 2227 by Jett Domingo RN  Outcome: Ongoing  8/21/2020 1938 by Hossein Shah RN  Outcome: Ongoing  Goal: Patient will achieve/maintain normal respiratory rate/effort  Description: Respiratory rate and effort will be within normal limits for the patient  8/22/2020 2003 by Isabela Pop RN  Outcome: Ongoing  8/21/2020 1938 by Esther Hassan RN  Outcome: Ongoing     Problem: HEMODYNAMIC STATUS  Goal: Patient has stable vital signs and fluid balance  8/22/2020 0742 by Isabela Pop RN  Outcome: Ongoing  8/21/2020 1938 by Esther Hassan RN  Outcome: Ongoing     Problem: FLUID AND ELECTROLYTE IMBALANCE  Goal: Fluid and electrolyte balance are achieved/maintained  8/22/2020 0742 by Isabela Pop RN  Outcome: Ongoing  8/21/2020 1938 by Esther Hassan RN  Outcome: Ongoing     Problem: ACTIVITY INTOLERANCE/IMPAIRED MOBILITY  Goal: Mobility/activity is maintained at optimum level for patient  8/22/2020 0742 by Isabela Pop RN  Outcome: Ongoing  8/21/2020 1938 by Esther Hassan RN  Outcome: Ongoing     Problem: Discharge Planning:  Goal: Discharged to appropriate level of care  Description: Discharged to appropriate level of care  Outcome: Ongoing     Problem:  Activity Intolerance:  Goal: Ability to tolerate increased activity will improve  Description: Ability to tolerate increased activity will improve  8/22/2020 0742 by Isabela Pop RN  Outcome: Ongoing  8/21/2020 2227 by Johnny López RN  Outcome: Ongoing     Problem: Airway Clearance - Ineffective:  Goal: Ability to maintain a clear airway will improve  Description: Ability to maintain a clear airway will improve  8/22/2020 0742 by Isabela Pop RN  Outcome: Ongoing  8/21/2020 2227 by Johnny López RN  Outcome: Ongoing     Problem: Breathing Pattern - Ineffective:  Goal: Ability to achieve and maintain a regular respiratory rate will improve  Description: Ability to achieve and maintain a regular respiratory rate will improve  8/22/2020 0742 by Isabela Pop RN  Outcome: Ongoing  8/21/2020 2227 by Johnny López RN  Outcome: Ongoing     Problem: Gas Exchange - Impaired:  Goal: Levels of oxygenation will improve  Description: Levels of oxygenation will improve  Outcome: Ongoing

## 2020-08-22 NOTE — PROGRESS NOTES
AM assessment complete. AM meds given. See MAR. Patient sitting up in bed, eating. Call light within reach. A/O x4. Bed in lowest position. Pt. Complains of moderate neck pain - neck assessed, but nothing felt. Face to face with Dr. Adriana Dumont.

## 2020-08-23 VITALS
SYSTOLIC BLOOD PRESSURE: 135 MMHG | HEIGHT: 65 IN | TEMPERATURE: 97.4 F | DIASTOLIC BLOOD PRESSURE: 84 MMHG | HEART RATE: 92 BPM | BODY MASS INDEX: 47.43 KG/M2 | RESPIRATION RATE: 18 BRPM | OXYGEN SATURATION: 94 % | WEIGHT: 284.7 LBS

## 2020-08-23 LAB
ANION GAP SERPL CALCULATED.3IONS-SCNC: 11 MMOL/L (ref 3–16)
BASE EXCESS VENOUS: 4.9 MMOL/L (ref -3–3)
BUN BLDV-MCNC: 24 MG/DL (ref 7–20)
CALCIUM SERPL-MCNC: 9.1 MG/DL (ref 8.3–10.6)
CARBOXYHEMOGLOBIN: 1.9 % (ref 0–1.5)
CHLORIDE BLD-SCNC: 96 MMOL/L (ref 99–110)
CO2: 31 MMOL/L (ref 21–32)
CREAT SERPL-MCNC: 0.9 MG/DL (ref 0.9–1.3)
GFR AFRICAN AMERICAN: >60
GFR NON-AFRICAN AMERICAN: >60
GLUCOSE BLD-MCNC: 94 MG/DL (ref 70–99)
HCO3 VENOUS: 30.3 MMOL/L (ref 23–29)
METHEMOGLOBIN VENOUS: 0.3 %
O2 CONTENT, VEN: 22 VOL %
O2 SAT, VEN: 83 %
O2 THERAPY: ABNORMAL
PCO2, VEN: 46.2 MMHG (ref 40–50)
PH VENOUS: 7.43 (ref 7.35–7.45)
PO2, VEN: 45.6 MMHG (ref 25–40)
POTASSIUM SERPL-SCNC: 3.8 MMOL/L (ref 3.5–5.1)
SODIUM BLD-SCNC: 138 MMOL/L (ref 136–145)
TCO2 CALC VENOUS: 32 MMOL/L

## 2020-08-23 PROCEDURE — 36415 COLL VENOUS BLD VENIPUNCTURE: CPT

## 2020-08-23 PROCEDURE — 94640 AIRWAY INHALATION TREATMENT: CPT

## 2020-08-23 PROCEDURE — 6360000002 HC RX W HCPCS: Performed by: INTERNAL MEDICINE

## 2020-08-23 PROCEDURE — 94761 N-INVAS EAR/PLS OXIMETRY MLT: CPT

## 2020-08-23 PROCEDURE — 6360000002 HC RX W HCPCS: Performed by: PHYSICIAN ASSISTANT

## 2020-08-23 PROCEDURE — 6370000000 HC RX 637 (ALT 250 FOR IP): Performed by: PHYSICIAN ASSISTANT

## 2020-08-23 PROCEDURE — 2580000003 HC RX 258: Performed by: PHYSICIAN ASSISTANT

## 2020-08-23 PROCEDURE — 6370000000 HC RX 637 (ALT 250 FOR IP): Performed by: INTERNAL MEDICINE

## 2020-08-23 PROCEDURE — 80048 BASIC METABOLIC PNL TOTAL CA: CPT

## 2020-08-23 PROCEDURE — 82803 BLOOD GASES ANY COMBINATION: CPT

## 2020-08-23 RX ORDER — DOXYCYCLINE HYCLATE 100 MG
100 TABLET ORAL EVERY 12 HOURS
Qty: 10 TABLET | Refills: 0 | Status: SHIPPED | OUTPATIENT
Start: 2020-08-23 | End: 2020-08-28

## 2020-08-23 RX ADMIN — FUROSEMIDE 40 MG: 10 INJECTION, SOLUTION INTRAMUSCULAR; INTRAVENOUS at 09:27

## 2020-08-23 RX ADMIN — PREDNISONE 40 MG: 20 TABLET ORAL at 09:26

## 2020-08-23 RX ADMIN — DOXYCYCLINE HYCLATE 100 MG: 100 TABLET, COATED ORAL at 02:09

## 2020-08-23 RX ADMIN — IPRATROPIUM BROMIDE AND ALBUTEROL SULFATE 1 AMPULE: .5; 3 SOLUTION RESPIRATORY (INHALATION) at 06:59

## 2020-08-23 RX ADMIN — ACETAMINOPHEN 650 MG: 325 TABLET ORAL at 09:39

## 2020-08-23 RX ADMIN — VENLAFAXINE HYDROCHLORIDE 75 MG: 75 CAPSULE, EXTENDED RELEASE ORAL at 09:26

## 2020-08-23 RX ADMIN — Medication 10 ML: at 09:28

## 2020-08-23 RX ADMIN — ENOXAPARIN SODIUM 40 MG: 100 INJECTION SUBCUTANEOUS at 09:26

## 2020-08-23 RX ADMIN — POTASSIUM CHLORIDE 10 MEQ: 750 TABLET, EXTENDED RELEASE ORAL at 09:26

## 2020-08-23 RX ADMIN — IPRATROPIUM BROMIDE AND ALBUTEROL SULFATE 1 AMPULE: .5; 3 SOLUTION RESPIRATORY (INHALATION) at 10:41

## 2020-08-23 ASSESSMENT — PAIN SCALES - GENERAL
PAINLEVEL_OUTOF10: 0
PAINLEVEL_OUTOF10: 0
PAINLEVEL_OUTOF10: 4

## 2020-08-23 NOTE — PLAN OF CARE
Problem: Falls - Risk of:  Goal: Will remain free from falls  Description: Will remain free from falls  Outcome: Ongoing     Problem: Daily Care:  Goal: Daily care needs are met  Description: Daily care needs are met  Outcome: Ongoing     Problem: Discharge Planning:  Goal: Patients continuum of care needs are met  Description: Patients continuum of care needs are met  Outcome: Ongoing     Problem: OXYGENATION/RESPIRATORY FUNCTION  Goal: Patient will maintain patent airway  Description: Patient's EF (Ejection Fraction) is greater than 40%    Patient's weights and intake/output reviewed:    Patient's Last Weight:  288lbs obtained by bed scale. Difference of 5 lbs less than last documented weight. Intake/Output Summary (Last 24 hours) at 08/21/20 1934  Last data filed at 08/21/20 1822          Gross per 24 hour  Intake:             1500 ml  Output:             3350 ml  Net   :            -1850 ml      Pt is currently on 3 L O2. Pt denies shortness of breath. Pt with pitting lower extremity edema. Patient and/or Family's stated Goal of Care this Admission: reduce shortness of breath, increase activity tolerance, better understand heart failure and disease management, be more comfortable, reduce lower extremity edema, and unable to assess, will attempt again prior to discharge      Comorbidities Reviewed Yes  Patient has a past medical history of Anxiety, Chronic obstructive pulmonary disease ([address] Crush injury of right foot, DDD (degenerative disc disease), lumbar, Erectile dysfunction, Fatigue, History of alcohol abuse, History of drug use, HNP (herniated nucleus pulposus), lumbar, Hyperglycemia, Hypersomnia, Kidney stone, Lumbar radiculopathy, Lumbar spine pain, Observed sleep apnea, Reactive depression, Spondylosis without myelopathy or radiculopathy, lumbar region, and Tobacco use.          >>For CHF and Comorbidity documentation on Education Time and Topics, please see Education Tab      Outcome: Ongoing  Goal: Patient will achieve/maintain normal respiratory rate/effort  Description: Respiratory rate and effort will be within normal limits for the patient  Outcome: Ongoing     Problem: ACTIVITY INTOLERANCE/IMPAIRED MOBILITY  Goal: Mobility/activity is maintained at optimum level for patient  Outcome: Ongoing

## 2020-08-23 NOTE — FLOWSHEET NOTE
08/22/20 1913   Vital Signs   Temp 97.3 °F (36.3 °C)   Temp Source Oral   Pulse 95   Heart Rate Source Monitor   Resp 18   BP (!) 140/61   BP Location Right upper arm   BP Upper/Lower Upper   Patient Position Sitting   Level of Consciousness 0   MEWS Score 1   Oxygen Therapy   SpO2 95 %   O2 Device Nasal cannula   O2 Flow Rate (L/min) 2 L/min   Pt A/O x 4 sitting on side of bed, upset at this time d/t his brother passing on Friday. Assessment completed. PM meds given. PRN Trazodone given. Pt denies any other needs at this time.  Call light within reach

## 2020-08-23 NOTE — PROGRESS NOTES
AM assessment complete. AM meds given. See MAR. Pt complains of low grade headache Pain level 4/10. Given PRN pain meds. Lights off, TV on. Bed in lowest position. Call light within reach. Denies further needs at this time.

## 2020-08-23 NOTE — PROGRESS NOTES
Discharge education given. Pt verbalizes understanding. IV removed. Walked pt to car; received by wife. Given prescription for doxycycline. Pt left with all belongings.

## 2020-08-23 NOTE — PLAN OF CARE
Problem: Falls - Risk of:  Goal: Will remain free from falls  Description: Will remain free from falls  8/23/2020 1048 by Isabela Pop RN  Outcome: Ongoing  8/22/2020 2258 by Johnny López RN  Outcome: Ongoing  Goal: Absence of physical injury  Description: Absence of physical injury  Outcome: Ongoing     Problem: Nutrition  Goal: Optimal nutrition therapy  Outcome: Ongoing  Goal: Understanding of nutritional guidelines  Outcome: Ongoing     Problem: Infection:  Goal: Will remain free from infection  Description: Will remain free from infection  Outcome: Ongoing     Problem: Safety:  Goal: Free from accidental physical injury  Description: Free from accidental physical injury  Outcome: Ongoing  Goal: Free from intentional harm  Description: Free from intentional harm  Outcome: Ongoing     Problem: Daily Care:  Goal: Daily care needs are met  Description: Daily care needs are met  8/23/2020 1048 by Isabela Pop RN  Outcome: Ongoing  8/22/2020 2258 by Johnny López RN  Outcome: Ongoing     Problem: Discharge Planning:  Goal: Patients continuum of care needs are met  Description: Patients continuum of care needs are met  8/23/2020 1048 by Isabela Pop RN  Outcome: Ongoing  8/22/2020 2258 by Johnny López RN  Outcome: Ongoing     Problem: OXYGENATION/RESPIRATORY FUNCTION  Goal: Patient will maintain patent airway  Description: Patient's EF (Ejection Fraction) is greater than 40%    Patient's weights and intake/output reviewed:    Patient's Last Weight:  288lbs obtained by bed scale. Difference of 5 lbs less than last documented weight. Intake/Output Summary (Last 24 hours) at 08/21/20 1934  Last data filed at 08/21/20 1822          Gross per 24 hour  Intake:             1500 ml  Output:             3350 ml  Net   :            -1850 ml      Pt is currently on 3 L O2. Pt denies shortness of breath. Pt with pitting lower extremity edema.      Patient and/or Family's stated Goal of Care this Admission: reduce shortness of breath, increase activity tolerance, better understand heart failure and disease management, be more comfortable, reduce lower extremity edema, and unable to assess, will attempt again prior to discharge      Comorbidities Reviewed Yes  Patient has a past medical history of Anxiety, Chronic obstructive pulmonary disease ([address] Crush injury of right foot, DDD (degenerative disc disease), lumbar, Erectile dysfunction, Fatigue, History of alcohol abuse, History of drug use, HNP (herniated nucleus pulposus), lumbar, Hyperglycemia, Hypersomnia, Kidney stone, Lumbar radiculopathy, Lumbar spine pain, Observed sleep apnea, Reactive depression, Spondylosis without myelopathy or radiculopathy, lumbar region, and Tobacco use. >>For CHF and Comorbidity documentation on Education Time and Topics, please see Education Tab      8/23/2020 1048 by Qasim Case RN  Outcome: Ongoing  8/22/2020 2258 by Barry Warren RN  Outcome: Ongoing  Goal: Patient will achieve/maintain normal respiratory rate/effort  Description: Respiratory rate and effort will be within normal limits for the patient  8/23/2020 1048 by Qasim Case RN  Outcome: Ongoing  8/22/2020 2258 by Barry Warren RN  Outcome: Ongoing     Problem: HEMODYNAMIC STATUS  Goal: Patient has stable vital signs and fluid balance  Outcome: Ongoing     Problem: FLUID AND ELECTROLYTE IMBALANCE  Goal: Fluid and electrolyte balance are achieved/maintained  Outcome: Ongoing     Problem: ACTIVITY INTOLERANCE/IMPAIRED MOBILITY  Goal: Mobility/activity is maintained at optimum level for patient  8/23/2020 1048 by Qasim Case RN  Outcome: Ongoing  8/22/2020 2258 by Barry Warren RN  Outcome: Ongoing     Problem: Discharge Planning:  Goal: Discharged to appropriate level of care  Description: Discharged to appropriate level of care  Outcome: Ongoing     Problem:  Activity Intolerance:  Goal: Ability to tolerate increased activity will improve  Description: Ability to tolerate increased activity will improve  Outcome: Ongoing     Problem: Airway Clearance - Ineffective:  Goal: Ability to maintain a clear airway will improve  Description: Ability to maintain a clear airway will improve  Outcome: Ongoing     Problem: Breathing Pattern - Ineffective:  Goal: Ability to achieve and maintain a regular respiratory rate will improve  Description: Ability to achieve and maintain a regular respiratory rate will improve  Outcome: Ongoing     Problem: Gas Exchange - Impaired:  Goal: Levels of oxygenation will improve  Description: Levels of oxygenation will improve  Outcome: Ongoing

## 2020-08-23 NOTE — CARE COORDINATION
DISCHARGE ORDER  Date/Time 2020 11:12 AM  Completed by: Edwina Heredia, Case Management    Patient Name: Nya Kern      : 1960  Admitting Diagnosis: Acute respiratory failure ([facility] [address] [J96.00]  COPD with acute exacerbation ([facility] [address] [J44.1]      Admit order Date and Status: 2020  (verify MD's last order for status of admission)      Noted discharge order. Confirmed discharge plan  : Yes  with whom patient  If pt confirmed DC plan does family need to be contacted by CM No   Discharge Plan: Order for dc noted. Spoke with pt at bedside and plan is to return home. Noted pt  Is on RA and no home O2 needed. Declines HHC. Chart reviewed and no other dc needs identified. Reviewed chart. Role of discharge planner explained and patient verbalized understanding. Discharge order is noted. Has Home O2 in place on admit:  No  Informed of need to bring portable home O2 tank on day of discharge for nursing to connect prior to leaving:   Not Indicated  Verbalized agreement/Understanding:   Not Indicated  Pt is being d/c'd to home today. Pt's O2 sats are 94% on RA    Discharge timeout done with  Pt, nsg and CM. All discharge needs and concerns addressed.

## 2020-08-23 NOTE — PROGRESS NOTES
Bedside report given to [facility]  pt in stable condition no needs at this time.  Call light within reach

## 2020-08-23 NOTE — DISCHARGE SUMMARY
280lbs on discharge.         COPD AE  - COVID ruled out  - finish PO  Doxycycline  - No steroid on discharge. - Pulmonology consult          OANH  - no BiPAP or CPAP until COVID ruled out-->ruled out  - Okay to resume home CPAP.        Depression   Anxiety   - Continue home medications         Insomnia  - continue Trazodone         Morbid Obesity  - Body mass index is 47.79 kg/m². - Complicating assessment and treatment. Placing patient at risk for multiple co-morbidities as well as early death and contributing to the patient's presentation.   - Counseled on weight loss.        Tobacco Abuse   - Recommend cessation   - PRN Nicotine patch/gum        Steroid-induced leukocytosis  -Decrease steroids,.  -Monitor white blood count         Physical Exam Performed:     /84   Pulse 92   Temp 97.4 °F (36.3 °C) (Oral)   Resp 18   Ht 5' 5\" (1.651 m)   Wt 284 lb 11.2 oz (129.1 kg)   SpO2 94%   BMI 47.38 kg/m²       General appearance:  No apparent distress, appears stated age and cooperative. HEENT:  Normal cephalic, atraumatic without obvious deformity. Pupils equal, round, and reactive to light. Extra ocular muscles intact. Conjunctivae/corneas clear. Neck: Supple, with full range of motion. No jugular venous distention. Trachea midline. Respiratory:  Normal respiratory effort. Clear to auscultation, bilaterally without Rales/Wheezes/Rhonchi. Cardiovascular:  Regular rate and rhythm with normal S1/S2 without murmurs, rubs or gallops. Abdomen: Soft, non-tender, non-distended with normal bowel sounds. Musculoskeletal:  No clubbing, cyanosis or edema bilaterally. Full range of motion without deformity. Skin: Skin color, texture, turgor normal.  No rashes or lesions. Neurologic:  Neurovascularly intact without any focal sensory/motor deficits.  Cranial nerves: II-XII intact, grossly non-focal.  Psychiatric:  Alert and oriented, thought content appropriate, normal insight  Capillary Refill: Brisk,< 3 seconds   Peripheral Pulses: +2 palpable, equal bilaterally       Labs: For convenience and continuity at follow-up the following most recent labs are provided:      CBC:    Lab Results   Component Value Date    WBC 17.6 08/22/2020    HGB 17.1 08/22/2020    HCT 52.4 08/22/2020     08/22/2020       Renal:    Lab Results   Component Value Date     08/23/2020    K 3.8 08/23/2020    K 4.9 08/20/2020    CL 96 08/23/2020    CO2 31 08/23/2020    BUN 24 08/23/2020    CREATININE 0.9 08/23/2020    CALCIUM 9.1 08/23/2020    PHOS 3.6 08/21/2020         Significant Diagnostic Studies    Radiology:   XR CHEST (2 VW)   Final Result   No acute acute cardiopulmonary process. XR CHEST PORTABLE   Final Result   Cardiac silhouette appears significantly enlarged compared to prior study   suggesting a pericardial effusion. This appearance may be exacerbated by   superimposition of soft tissues. No definite pulmonary consolidation. Consults:     IP CONSULT TO PULMONOLOGY  IP CONSULT TO SPIRITUAL SERVICES    Disposition:  home     Condition at Discharge: Stable    Discharge Instructions/Follow-up:  PCP 1 week.      Code Status:  Full Code     Activity: activity as tolerated    Diet: cardiac diet      Discharge Medications:     Current Discharge Medication List           Details   doxycycline hyclate (VIBRA-TABS) 100 MG tablet Take 1 tablet by mouth every 12 hours for 5 days  Qty: 10 tablet, Refills: 0              Details   traZODone (DESYREL) 50 MG tablet TAKE ONE TABLET BY MOUTH ONCE NIGHTLY AS NEEDED FOR SLEEP  Qty: 30 tablet, Refills: 0    Associated Diagnoses: Insomnia, unspecified type      potassium chloride (KLOR-CON) 10 MEQ extended release tablet TAKE ONE TABLET BY MOUTH DAILY  Qty: 30 tablet, Refills: 0    Associated Diagnoses: Acute congestive heart failure, unspecified heart failure type (HCC)      furosemide (LASIX) 20 MG tablet Take 1 tablet by mouth every morning  Qty: 30 tablet, Refills: 1    Associated Diagnoses: Acute congestive heart failure, unspecified heart failure type (HCC)      albuterol (PROVENTIL) (2.5 MG/3ML) 0.083% nebulizer solution Take 3 mLs by nebulization every 6 hours as needed for Wheezing or Shortness of Breath  Qty: 25 vial, Refills: 1    Associated Diagnoses: SOB (shortness of breath)      ipratropium (ATROVENT) 0.02 % nebulizer solution Take 2.5 mLs by nebulization 4 times daily as needed for Wheezing (short of breath)  Qty: 2.5 mL, Refills: 1    Associated Diagnoses: SOB (shortness of breath)      albuterol sulfate HFA (PROVENTIL HFA) 108 (90 Base) MCG/ACT inhaler Inhale 2 puffs into the lungs every 6 hours as needed for Wheezing  Qty: 1 Inhaler, Refills: 0      venlafaxine (EFFEXOR XR) 75 MG extended release capsule TAKE ONE CAPSULE BY MOUTH DAILY  Qty: 30 capsule, Refills: 0    Associated Diagnoses: Anxiety; Reactive depression             Time Spent on discharge is more than 30 minutes in the examination, evaluation, counseling and review of medications and discharge plan. Signed:    Alma Perez MD   8/23/2020      Thank you ANICETO Lindquist - ROXANA for the opportunity to be involved in this patient's care. If you have any questions or concerns please feel free to contact me at [phone].

## 2020-08-24 ENCOUNTER — FOLLOWUP TELEPHONE ENCOUNTER (OUTPATIENT)
Dept: ADMINISTRATIVE | Age: 60
End: 2020-08-24

## 2020-08-24 ENCOUNTER — TELEPHONE (OUTPATIENT)
Dept: PULMONOLOGY | Age: 60
End: 2020-08-24

## 2020-08-24 NOTE — TELEPHONE ENCOUNTER
MD Mark Corbin MA               Offer f/u with us with a CT CHEST no IV dye in 2-4 weeks to f/u Pulmonary Nodule or he can do this with his PCP as I believe his nurse pract had ordered this previously

## 2020-08-24 NOTE — TELEPHONE ENCOUNTER
1st Attempt; HIPAA compliant VM message left with non urgent CHF nurse call back number as resource. Reminder message of FU appointment for 8/28 at 11:40 AM with ANICEOT Hendrix - ROXANA.

## 2020-08-25 NOTE — TELEPHONE ENCOUNTER
2nd Attempt; HIPAA compliant VM message left with non urgent CHF nurse call back number as resource.

## 2020-08-28 ENCOUNTER — OFFICE VISIT (OUTPATIENT)
Dept: FAMILY MEDICINE CLINIC | Age: 60
End: 2020-08-28
Payer: COMMERCIAL

## 2020-08-28 VITALS
BODY MASS INDEX: 47.59 KG/M2 | TEMPERATURE: 98.8 F | DIASTOLIC BLOOD PRESSURE: 80 MMHG | SYSTOLIC BLOOD PRESSURE: 122 MMHG | WEIGHT: 286 LBS | HEART RATE: 84 BPM | OXYGEN SATURATION: 97 %

## 2020-08-28 PROCEDURE — 1111F DSCHRG MED/CURRENT MED MERGE: CPT | Performed by: NURSE PRACTITIONER

## 2020-08-28 PROCEDURE — 99495 TRANSJ CARE MGMT MOD F2F 14D: CPT | Performed by: NURSE PRACTITIONER

## 2020-08-28 NOTE — PATIENT INSTRUCTIONS
· Limit sodium to 2,000 milligrams (mg) a day or less (less than 1 teaspoon of salt a day), including all the salt you eat in cooking or in packaged foods. ·  avoid processed foods (they are high in sodium)  · Avoid fast food and restaurant meals because they tend to be very high in sodium. · Fluid restriction:  None at this time  · Weigh self without clothing at the same time each day. · Record  Weight and bring to office visits   · Call  if you have a sudden weight gain, such as more than 2 to 3 pounds in a day or 5 pounds in a week. ·  A sudden weight gain may mean that your heart failure is getting worse. · If you smoke you should quit  · Limit alcohol to 2 drinks a day for men and 1 drink a day for women. · Avoid getting sick from colds and the flu. Get a pneumococcal vaccine and influenza vaccine.

## 2020-09-09 RX ORDER — FUROSEMIDE 20 MG/1
TABLET ORAL
Qty: 30 TABLET | Refills: 0 | Status: ON HOLD | OUTPATIENT
Start: 2020-09-09 | End: 2020-09-29 | Stop reason: HOSPADM

## 2020-09-09 NOTE — TELEPHONE ENCOUNTER
Last OV 8/28/20  Future Appointments   Date Time Provider Riri Jara   9/11/2020  8:15 AM SCHEDULE, [facility]

## 2020-09-11 ENCOUNTER — NURSE ONLY (OUTPATIENT)
Dept: FAMILY MEDICINE CLINIC | Age: 60
End: 2020-09-11
Payer: COMMERCIAL

## 2020-09-11 ENCOUNTER — TELEPHONE (OUTPATIENT)
Dept: FAMILY MEDICINE CLINIC | Age: 60
End: 2020-09-11

## 2020-09-11 PROCEDURE — 36415 COLL VENOUS BLD VENIPUNCTURE: CPT | Performed by: NURSE PRACTITIONER

## 2020-09-11 NOTE — TELEPHONE ENCOUNTER
Patient was on schedule for blood work and the only thing in there was a PSA. He stated there were supposed to be orders for more labs. The only thing I saw was he had an abnormal CBC in the hospital. What labs would you like done. He was NOT very nice about there not being any orders for him. I'm not sure at all what to draw so I drew a cbc and collected the psa. Is there anything else you would like done.  Thanks

## 2020-09-11 NOTE — PROGRESS NOTES
Post-Discharge Transitional Care Management Services or Hospital Follow Up      Joellen Sandifer   YOB: 1960    Date of Office Visit:  8/28/2020  Date of Hospital Admission: 8/19/20  Date of Hospital Discharge: 8/23/20  Risk of hospital readmission (high >=14%.  Medium >=10%) :Readmission Risk Score: 19      Care management risk score Rising risk (score 2-5) and Complex Care (Scores >=6): 3     Non face to face  following discharge, date last encounter closed (first attempt may have been earlier): *No documented post hospital discharge outreach found in the last 14 days    Call initiated 2 business days of discharge: *No response recorded in the last 14 days    Patient Active Problem List   Diagnosis    Crush injury of right foot    SOB (shortness of breath)    Chest pain    Erectile dysfunction    Hyperglycemia    Anxiety    Depression    Tobacco abuse    History of alcohol abuse    Fatigue    OANH (obstructive sleep apnea)    Hypersomnia    Chronic obstructive pulmonary disease with acute exacerbation (HCC)    Lumbar radiculopathy    HNP (herniated nucleus pulposus), lumbar    Spondylosis without myelopathy or radiculopathy, lumbar region    DDD (degenerative disc disease), lumbar    Lumbar spine pain    PNA (pneumonia)    Pneumonia    Acute respiratory distress    Class 3 severe obesity with body mass index (BMI) of 45.0 to 49.9 in adult ([address]    Pneumonia, primary atypical    Acute respiratory failure with hypoxia ([address]    Moderate protein-calorie malnutrition ([address]    Acute respiratory failure ([address]    Acute on chronic respiratory failure with hypoxemia (HCC)    Acute diastolic congestive heart failure (HCC)       Allergies   Allergen Reactions    Codeine Itching    Dilaudid [Hydromorphone Hcl]        Medications listed as ordered at the time of discharge from [facility] Medication Instructions MIKE:    Printed on:09/11/20 0815   Medication Information                      albuterol (PROVENTIL) (2.5 MG/3ML) 0.083% nebulizer solution  Take 3 mLs by nebulization every 6 hours as needed for Wheezing or Shortness of Breath             albuterol sulfate HFA (PROVENTIL HFA) 108 (90 Base) MCG/ACT inhaler  Inhale 2 puffs into the lungs every 6 hours as needed for Wheezing             furosemide (LASIX) 20 MG tablet  TAKE ONE TABLET BY MOUTH EVERY MORNING             ipratropium (ATROVENT) 0.02 % nebulizer solution  Take 2.5 mLs by nebulization 4 times daily as needed for Wheezing (short of breath)             potassium chloride (KLOR-CON) 10 MEQ extended release tablet  TAKE ONE TABLET BY MOUTH DAILY             traZODone (DESYREL) 50 MG tablet  TAKE ONE TABLET BY MOUTH ONCE NIGHTLY AS NEEDED FOR SLEEP             venlafaxine (EFFEXOR XR) 75 MG extended release capsule  TAKE ONE CAPSULE BY MOUTH DAILY                   Medications marked \"taking\" at this time  Outpatient Medications Marked as Taking for the 20 encounter (Office Visit) with ANICETO Hawkins CNP   Medication Sig Dispense Refill    [] doxycycline hyclate (VIBRA-TABS) 100 MG tablet Take 1 tablet by mouth every 12 hours for 5 days 10 tablet 0    venlafaxine (EFFEXOR XR) 75 MG extended release capsule TAKE ONE CAPSULE BY MOUTH DAILY 30 capsule 0    traZODone (DESYREL) 50 MG tablet TAKE ONE TABLET BY MOUTH ONCE NIGHTLY AS NEEDED FOR SLEEP 30 tablet 0    potassium chloride (KLOR-CON) 10 MEQ extended release tablet TAKE ONE TABLET BY MOUTH DAILY 30 tablet 0    [DISCONTINUED] furosemide (LASIX) 20 MG tablet Take 1 tablet by mouth every morning (Patient taking differently: Take 40 mg by mouth every morning ) 30 tablet 1    albuterol (PROVENTIL) (2.5 MG/3ML) 0.083% nebulizer solution Take 3 mLs by nebulization every 6 hours as needed for Wheezing or Shortness of Breath 25 vial 1    ipratropium (ATROVENT) 0.02 % nebulizer solution Take 2.5 mLs by nebulization 4 times daily as needed for Wheezing (short of breath) 2.5 mL 1    albuterol sulfate HFA (PROVENTIL HFA) 108 (90 Base) MCG/ACT inhaler Inhale 2 puffs into the lungs every 6 hours as needed for Wheezing 1 Inhaler 0        Medications patient taking as of now reconciled against medications ordered at time of hospital discharge: Yes    Chief Complaint   Patient presents with   [address] from Hospital     discharged from [facility] / pt has question about his wbc being elevated and having an infection somewhere       History of Present illness - Follow up of Hospital diagnosis(es): acute on chronic respiratory failure, acute hypoxic respiratory failure, acute on chronic dCHF, COPD exacerbation, OANH    Inpatient course: Discharge summary reviewed- see chart. Interval history/Current status: patient states he is feeling better. He states he has his chronic dyspnea but is back to his baseline. Still not using oxygen at home. His edema has improved. He denies any edema since discharge. He continues to not smoke  Patient denies any exertional chest pain, dyspnea, palpitations, syncope, orthopnea, edema or paroxysmal nocturnal dyspnea. A comprehensive review of systems was negative except for what was noted in the HPI. Vitals:    08/28/20 1134   BP: 122/80   Site: Right Upper Arm   Position: Sitting   Cuff Size: Large Adult   Pulse: 84   Temp: 98.8 °F (37.1 °C)   TempSrc: Temporal   SpO2: 97%   Weight: 286 lb (129.7 kg)     Body mass index is 47.59 kg/m².    Wt Readings from Last 3 Encounters:   08/28/20 286 lb (129.7 kg)   08/23/20 284 lb 11.2 oz (129.1 kg)   06/26/20 282 lb (127.9 kg)     BP Readings from Last 3 Encounters:   08/28/20 122/80   08/23/20 135/84   06/26/20 138/80        Physical Exam:  General Appearance: alert and oriented to person, place and time, well developed and well- nourished, in no acute distress  Skin: warm and dry, no rash or erythema  Head: normocephalic and atraumatic  Eyes: pupils equal, round, and reactive to light, extraocular eye movements intact, conjunctivae normal  ENT: tympanic membrane, external ear and ear canal normal bilaterally, nose without deformity, nasal mucosa and turbinates normal without polyps  Neck: supple and non-tender without mass, no thyromegaly or thyroid nodules, no cervical lymphadenopathy  Pulmonary/Chest: clear to auscultation bilaterally- no wheezes, rales or rhonchi, normal air movement, no respiratory distress  Cardiovascular: normal rate, regular rhythm, normal S1 and S2, no murmurs, rubs, clicks, or gallops, distal pulses intact, no carotid bruits  Abdomen: soft, non-tender, non-distended, normal bowel sounds, no masses or organomegaly  Extremities: no cyanosis, clubbing or edema  Musculoskeletal: normal range of motion, no joint swelling, deformity or tenderness  Neurologic: reflexes normal and symmetric, no cranial nerve deficit, gait, coordination and speech normal    Assessment/Plan:  1. Acute on chronic respiratory failure with hypoxemia (HCC)  O2 sat on RA in office 97%  Not using oxygen at home  Follow up with pulmo  - AR DISCHARGE MEDS RECONCILED W/ CURRENT OUTPATIENT MED LIST    2. Acute respiratory failure with hypoxia (HCC)  No hypoxia present today  - AR DISCHARGE MEDS RECONCILED W/ CURRENT OUTPATIENT MED LIST    3. Acute diastolic congestive heart failure (HCC)  Edema has resolved  F/u with cardiology  Continue medications as prescribed  Discussed the following  · Limit sodium to 2,000 milligrams (mg) a day or less (less than 1 teaspoon of salt a day), including all the salt you eat in cooking or in packaged foods. ·  avoid processed foods (they are high in sodium)  · Avoid fast food and restaurant meals because they tend to be very high in sodium. · Fluid restriction:  None at this time  · Weigh self without clothing at the same time each day.    · Record  Weight and bring to office visits   · Call  if you have a sudden weight gain, such as more than 2 to 3 pounds in a day or 5 pounds in a week. ·  A sudden weight gain may mean that your heart failure is getting worse. · If you smoke you should quit  · Limit alcohol to 2 drinks a day for men and 1 drink a day for women. · Avoid getting sick from colds and the flu. Get a pneumococcal vaccine and influenza vaccine. - CO DISCHARGE MEDS RECONCILED W/ CURRENT OUTPATIENT MED LIST    4. Chronic obstructive pulmonary disease with acute exacerbation (HCC)  Complete atb and steroids as ordered  Leukocytosis during hospital stay--most likely steroid induced  Recheck cbc  - CO DISCHARGE MEDS RECONCILED W/ CURRENT OUTPATIENT MED LIST    5. OANH (obstructive sleep apnea)  Continue with CPAP  - CO DISCHARGE MEDS RECONCILED W/ CURRENT OUTPATIENT MED LIST    6. History of tobacco use  Continue with cessation   - CO DISCHARGE MEDS RECONCILED W/ CURRENT OUTPATIENT MED LIST    7.  Hospital discharge follow-up  Extensively reviewed and discussed recent in patient hospital records, labs, imaging and consults with patient, discussed HPI and Plan, coordinated care and patient counseling provided at today's visit    - CO DISCHARGE MEDS RECONCILED W/ CURRENT OUTPATIENT MED LIST        Medical Decision Making: moderate complexity

## 2020-09-12 LAB
A/G RATIO: 1.6 (ref 1.1–2.2)
ALBUMIN SERPL-MCNC: 3.6 G/DL (ref 3.4–5)
ALP BLD-CCNC: 59 U/L (ref 40–129)
ALT SERPL-CCNC: 22 U/L (ref 10–40)
ANION GAP SERPL CALCULATED.3IONS-SCNC: 10 MMOL/L (ref 3–16)
AST SERPL-CCNC: 17 U/L (ref 15–37)
BASOPHILS ABSOLUTE: 0 K/UL (ref 0–0.2)
BASOPHILS RELATIVE PERCENT: 0.5 %
BILIRUB SERPL-MCNC: 0.4 MG/DL (ref 0–1)
BUN BLDV-MCNC: 17 MG/DL (ref 7–20)
CALCIUM SERPL-MCNC: 9.2 MG/DL (ref 8.3–10.6)
CHLORIDE BLD-SCNC: 104 MMOL/L (ref 99–110)
CHOLESTEROL, TOTAL: 152 MG/DL (ref 0–199)
CO2: 26 MMOL/L (ref 21–32)
CREAT SERPL-MCNC: 0.8 MG/DL (ref 0.9–1.3)
EOSINOPHILS ABSOLUTE: 0.3 K/UL (ref 0–0.6)
EOSINOPHILS RELATIVE PERCENT: 3.1 %
GFR AFRICAN AMERICAN: >60
GFR NON-AFRICAN AMERICAN: >60
GLOBULIN: 2.2 G/DL
GLUCOSE BLD-MCNC: 120 MG/DL (ref 70–99)
HCT VFR BLD CALC: 48.5 % (ref 40.5–52.5)
HDLC SERPL-MCNC: 34 MG/DL (ref 40–60)
HEMOGLOBIN: 16 G/DL (ref 13.5–17.5)
LDL CHOLESTEROL CALCULATED: 63 MG/DL
LYMPHOCYTES ABSOLUTE: 1.9 K/UL (ref 1–5.1)
LYMPHOCYTES RELATIVE PERCENT: 23.1 %
MCH RBC QN AUTO: 29.9 PG (ref 26–34)
MCHC RBC AUTO-ENTMCNC: 32.9 G/DL (ref 31–36)
MCV RBC AUTO: 90.7 FL (ref 80–100)
MONOCYTES ABSOLUTE: 0.5 K/UL (ref 0–1.3)
MONOCYTES RELATIVE PERCENT: 6.1 %
NEUTROPHILS ABSOLUTE: 5.6 K/UL (ref 1.7–7.7)
NEUTROPHILS RELATIVE PERCENT: 67.2 %
PDW BLD-RTO: 15.1 % (ref 12.4–15.4)
PLATELET # BLD: 186 K/UL (ref 135–450)
PMV BLD AUTO: 9.1 FL (ref 5–10.5)
POTASSIUM SERPL-SCNC: 4.1 MMOL/L (ref 3.5–5.1)
PROSTATE SPECIFIC ANTIGEN: 1.53 NG/ML (ref 0–4)
RBC # BLD: 5.35 M/UL (ref 4.2–5.9)
SODIUM BLD-SCNC: 140 MMOL/L (ref 136–145)
TOTAL PROTEIN: 5.8 G/DL (ref 6.4–8.2)
TRIGL SERPL-MCNC: 275 MG/DL (ref 0–150)
VLDLC SERPL CALC-MCNC: 55 MG/DL
WBC # BLD: 8.3 K/UL (ref 4–11)

## 2020-09-15 ENCOUNTER — HOSPITAL ENCOUNTER (OUTPATIENT)
Dept: CT IMAGING | Age: 60
Discharge: HOME | End: 2020-09-15
Payer: COMMERCIAL

## 2020-09-15 PROCEDURE — 71250 CT THORAX DX C-: CPT

## 2020-09-17 ENCOUNTER — TELEPHONE (OUTPATIENT)
Dept: FAMILY MEDICINE CLINIC | Age: 60
End: 2020-09-17

## 2020-09-17 RX ORDER — ATORVASTATIN CALCIUM 10 MG/1
10 TABLET, FILM COATED ORAL DAILY
Qty: 30 TABLET | Refills: 3 | Status: ON HOLD | OUTPATIENT
Start: 2020-09-17 | End: 2020-09-29 | Stop reason: HOSPADM

## 2020-09-17 NOTE — TELEPHONE ENCOUNTER
Pt called was wanting to see if his CT and blood work was back so he could get the results. Shahbaz Ing that he was nervous about the CT scan.

## 2020-09-18 ENCOUNTER — APPOINTMENT (OUTPATIENT)
Dept: CT IMAGING | Age: 60
DRG: 303 | End: 2020-09-18
Payer: COMMERCIAL

## 2020-09-18 ENCOUNTER — HOSPITAL ENCOUNTER (INPATIENT)
Age: 60
LOS: 4 days | Discharge: ANOTHER HOSPITAL | DRG: 303 | End: 2020-09-22
Attending: EMERGENCY MEDICINE | Admitting: INTERNAL MEDICINE
Payer: COMMERCIAL

## 2020-09-18 ENCOUNTER — APPOINTMENT (OUTPATIENT)
Dept: GENERAL RADIOLOGY | Age: 60
DRG: 303 | End: 2020-09-18
Payer: COMMERCIAL

## 2020-09-18 PROBLEM — R06.09 DYSPNEA ON EXERTION: Status: ACTIVE | Noted: 2019-08-07

## 2020-09-18 LAB
ANION GAP SERPL CALCULATED.3IONS-SCNC: 7 MMOL/L (ref 3–16)
BASE EXCESS ARTERIAL: 4.2 MMOL/L (ref -3–3)
BASOPHILS ABSOLUTE: 0.1 K/UL (ref 0–0.2)
BASOPHILS RELATIVE PERCENT: 0.8 %
BUN BLDV-MCNC: 10 MG/DL (ref 7–20)
CALCIUM SERPL-MCNC: 9.1 MG/DL (ref 8.3–10.6)
CARBOXYHEMOGLOBIN ARTERIAL: 1.4 % (ref 0–1.5)
CHLORIDE BLD-SCNC: 101 MMOL/L (ref 99–110)
CO2: 30 MMOL/L (ref 21–32)
CREAT SERPL-MCNC: 0.8 MG/DL (ref 0.9–1.3)
D DIMER: 251 NG/ML DDU (ref 0–229)
EKG ATRIAL RATE: 74 BPM
EKG DIAGNOSIS: NORMAL
EKG P AXIS: 51 DEGREES
EKG P-R INTERVAL: 174 MS
EKG Q-T INTERVAL: 370 MS
EKG QRS DURATION: 76 MS
EKG QTC CALCULATION (BAZETT): 410 MS
EKG R AXIS: -79 DEGREES
EKG T AXIS: 59 DEGREES
EKG VENTRICULAR RATE: 74 BPM
EOSINOPHILS ABSOLUTE: 0.2 K/UL (ref 0–0.6)
EOSINOPHILS RELATIVE PERCENT: 1.8 %
GFR AFRICAN AMERICAN: >60
GFR NON-AFRICAN AMERICAN: >60
GLUCOSE BLD-MCNC: 119 MG/DL (ref 70–99)
HCO3 ARTERIAL: 28.7 MMOL/L (ref 21–29)
HCT VFR BLD CALC: 49.3 % (ref 40.5–52.5)
HEMOGLOBIN, ART, EXTENDED: 17.2 G/DL (ref 13.5–17.5)
HEMOGLOBIN: 16.4 G/DL (ref 13.5–17.5)
LYMPHOCYTES ABSOLUTE: 2.4 K/UL (ref 1–5.1)
LYMPHOCYTES RELATIVE PERCENT: 20.6 %
MCH RBC QN AUTO: 29.6 PG (ref 26–34)
MCHC RBC AUTO-ENTMCNC: 33.3 G/DL (ref 31–36)
MCV RBC AUTO: 88.9 FL (ref 80–100)
METHEMOGLOBIN ARTERIAL: 0.3 %
MONOCYTES ABSOLUTE: 0.6 K/UL (ref 0–1.3)
MONOCYTES RELATIVE PERCENT: 5 %
NEUTROPHILS ABSOLUTE: 8.3 K/UL (ref 1.7–7.7)
NEUTROPHILS RELATIVE PERCENT: 71.8 %
O2 CONTENT ARTERIAL: 22 ML/DL
O2 SAT, ARTERIAL: 93.3 %
O2 THERAPY: ABNORMAL
PCO2 ARTERIAL: 42.2 MMHG (ref 35–45)
PDW BLD-RTO: 14.7 % (ref 12.4–15.4)
PH ARTERIAL: 7.45 (ref 7.35–7.45)
PLATELET # BLD: 211 K/UL (ref 135–450)
PMV BLD AUTO: 7.9 FL (ref 5–10.5)
PO2 ARTERIAL: 64.1 MMHG (ref 75–108)
POTASSIUM SERPL-SCNC: 4.4 MMOL/L (ref 3.5–5.1)
PRO-BNP: 94 PG/ML (ref 0–124)
RBC # BLD: 5.54 M/UL (ref 4.2–5.9)
SODIUM BLD-SCNC: 138 MMOL/L (ref 136–145)
TCO2 ARTERIAL: 30 MMOL/L
TROPONIN: <0.01 NG/ML
WBC # BLD: 11.6 K/UL (ref 4–11)

## 2020-09-18 PROCEDURE — 2580000003 HC RX 258: Performed by: NURSE PRACTITIONER

## 2020-09-18 PROCEDURE — 93005 ELECTROCARDIOGRAM TRACING: CPT | Performed by: EMERGENCY MEDICINE

## 2020-09-18 PROCEDURE — 2700000000 HC OXYGEN THERAPY PER DAY

## 2020-09-18 PROCEDURE — 6360000002 HC RX W HCPCS: Performed by: NURSE PRACTITIONER

## 2020-09-18 PROCEDURE — 82803 BLOOD GASES ANY COMBINATION: CPT

## 2020-09-18 PROCEDURE — 85379 FIBRIN DEGRADATION QUANT: CPT

## 2020-09-18 PROCEDURE — 36600 WITHDRAWAL OF ARTERIAL BLOOD: CPT

## 2020-09-18 PROCEDURE — 93010 ELECTROCARDIOGRAM REPORT: CPT | Performed by: INTERNAL MEDICINE

## 2020-09-18 PROCEDURE — 94640 AIRWAY INHALATION TREATMENT: CPT

## 2020-09-18 PROCEDURE — 6370000000 HC RX 637 (ALT 250 FOR IP): Performed by: EMERGENCY MEDICINE

## 2020-09-18 PROCEDURE — 84484 ASSAY OF TROPONIN QUANT: CPT

## 2020-09-18 PROCEDURE — 94761 N-INVAS EAR/PLS OXIMETRY MLT: CPT

## 2020-09-18 PROCEDURE — 71260 CT THORAX DX C+: CPT

## 2020-09-18 PROCEDURE — 36415 COLL VENOUS BLD VENIPUNCTURE: CPT

## 2020-09-18 PROCEDURE — 6370000000 HC RX 637 (ALT 250 FOR IP): Performed by: NURSE PRACTITIONER

## 2020-09-18 PROCEDURE — 71045 X-RAY EXAM CHEST 1 VIEW: CPT

## 2020-09-18 PROCEDURE — 6370000000 HC RX 637 (ALT 250 FOR IP): Performed by: INTERNAL MEDICINE

## 2020-09-18 PROCEDURE — 1200000000 HC SEMI PRIVATE

## 2020-09-18 PROCEDURE — 6360000002 HC RX W HCPCS: Performed by: PHYSICIAN ASSISTANT

## 2020-09-18 PROCEDURE — U0003 INFECTIOUS AGENT DETECTION BY NUCLEIC ACID (DNA OR RNA); SEVERE ACUTE RESPIRATORY SYNDROME CORONAVIRUS 2 (SARS-COV-2) (CORONAVIRUS DISEASE [COVID-19]), AMPLIFIED PROBE TECHNIQUE, MAKING USE OF HIGH THROUGHPUT TECHNOLOGIES AS DESCRIBED BY CMS-2020-01-R: HCPCS

## 2020-09-18 PROCEDURE — 6360000004 HC RX CONTRAST MEDICATION: Performed by: INTERNAL MEDICINE

## 2020-09-18 PROCEDURE — 99222 1ST HOSP IP/OBS MODERATE 55: CPT | Performed by: PHYSICIAN ASSISTANT

## 2020-09-18 PROCEDURE — 83036 HEMOGLOBIN GLYCOSYLATED A1C: CPT

## 2020-09-18 PROCEDURE — 83880 ASSAY OF NATRIURETIC PEPTIDE: CPT

## 2020-09-18 PROCEDURE — 99285 EMERGENCY DEPT VISIT HI MDM: CPT

## 2020-09-18 PROCEDURE — 6370000000 HC RX 637 (ALT 250 FOR IP)

## 2020-09-18 PROCEDURE — 85025 COMPLETE CBC W/AUTO DIFF WBC: CPT

## 2020-09-18 PROCEDURE — 80048 BASIC METABOLIC PNL TOTAL CA: CPT

## 2020-09-18 RX ORDER — POTASSIUM CHLORIDE 7.45 MG/ML
10 INJECTION INTRAVENOUS PRN
Status: DISCONTINUED | OUTPATIENT
Start: 2020-09-18 | End: 2020-09-22 | Stop reason: HOSPADM

## 2020-09-18 RX ORDER — POTASSIUM CHLORIDE 750 MG/1
40 TABLET, EXTENDED RELEASE ORAL PRN
Status: DISCONTINUED | OUTPATIENT
Start: 2020-09-18 | End: 2020-09-22 | Stop reason: HOSPADM

## 2020-09-18 RX ORDER — POTASSIUM CHLORIDE 750 MG/1
10 TABLET, EXTENDED RELEASE ORAL DAILY
Status: DISCONTINUED | OUTPATIENT
Start: 2020-09-18 | End: 2020-09-22 | Stop reason: HOSPADM

## 2020-09-18 RX ORDER — ASPIRIN 81 MG/1
TABLET, CHEWABLE ORAL
Status: COMPLETED
Start: 2020-09-18 | End: 2020-09-18

## 2020-09-18 RX ORDER — SODIUM CHLORIDE 0.9 % (FLUSH) 0.9 %
10 SYRINGE (ML) INJECTION EVERY 12 HOURS SCHEDULED
Status: DISCONTINUED | OUTPATIENT
Start: 2020-09-18 | End: 2020-09-22 | Stop reason: HOSPADM

## 2020-09-18 RX ORDER — ONDANSETRON 2 MG/ML
4 INJECTION INTRAMUSCULAR; INTRAVENOUS EVERY 6 HOURS PRN
Status: DISCONTINUED | OUTPATIENT
Start: 2020-09-18 | End: 2020-09-22 | Stop reason: HOSPADM

## 2020-09-18 RX ORDER — ATORVASTATIN CALCIUM 10 MG/1
10 TABLET, FILM COATED ORAL DAILY
Status: DISCONTINUED | OUTPATIENT
Start: 2020-09-18 | End: 2020-09-18

## 2020-09-18 RX ORDER — ACETAMINOPHEN 325 MG/1
650 TABLET ORAL EVERY 6 HOURS PRN
Status: DISCONTINUED | OUTPATIENT
Start: 2020-09-18 | End: 2020-09-22 | Stop reason: HOSPADM

## 2020-09-18 RX ORDER — SODIUM CHLORIDE 0.9 % (FLUSH) 0.9 %
10 SYRINGE (ML) INJECTION PRN
Status: DISCONTINUED | OUTPATIENT
Start: 2020-09-18 | End: 2020-09-22 | Stop reason: HOSPADM

## 2020-09-18 RX ORDER — POLYETHYLENE GLYCOL 3350 17 G/17G
17 POWDER, FOR SOLUTION ORAL DAILY PRN
Status: DISCONTINUED | OUTPATIENT
Start: 2020-09-18 | End: 2020-09-22 | Stop reason: HOSPADM

## 2020-09-18 RX ORDER — FUROSEMIDE 20 MG/1
20 TABLET ORAL DAILY
Status: DISCONTINUED | OUTPATIENT
Start: 2020-09-18 | End: 2020-09-18

## 2020-09-18 RX ORDER — ALBUTEROL SULFATE 90 UG/1
2 AEROSOL, METERED RESPIRATORY (INHALATION) 3 TIMES DAILY
Status: DISCONTINUED | OUTPATIENT
Start: 2020-09-18 | End: 2020-09-20

## 2020-09-18 RX ORDER — ACETAMINOPHEN 650 MG/1
650 SUPPOSITORY RECTAL EVERY 6 HOURS PRN
Status: DISCONTINUED | OUTPATIENT
Start: 2020-09-18 | End: 2020-09-22 | Stop reason: HOSPADM

## 2020-09-18 RX ORDER — ALBUTEROL SULFATE 90 UG/1
2 AEROSOL, METERED RESPIRATORY (INHALATION) EVERY 6 HOURS PRN
Status: DISCONTINUED | OUTPATIENT
Start: 2020-09-18 | End: 2020-09-22 | Stop reason: HOSPADM

## 2020-09-18 RX ORDER — ALBUTEROL SULFATE 90 UG/1
2 AEROSOL, METERED RESPIRATORY (INHALATION) EVERY 6 HOURS PRN
Status: DISCONTINUED | OUTPATIENT
Start: 2020-09-18 | End: 2020-09-18

## 2020-09-18 RX ORDER — FUROSEMIDE 10 MG/ML
20 INJECTION INTRAMUSCULAR; INTRAVENOUS ONCE
Status: COMPLETED | OUTPATIENT
Start: 2020-09-18 | End: 2020-09-18

## 2020-09-18 RX ORDER — ASPIRIN 81 MG/1
81 TABLET, CHEWABLE ORAL DAILY
Status: DISCONTINUED | OUTPATIENT
Start: 2020-09-19 | End: 2020-09-22

## 2020-09-18 RX ORDER — ASPIRIN 81 MG/1
324 TABLET, CHEWABLE ORAL ONCE
Status: COMPLETED | OUTPATIENT
Start: 2020-09-18 | End: 2020-09-18

## 2020-09-18 RX ORDER — PROMETHAZINE HYDROCHLORIDE 25 MG/1
12.5 TABLET ORAL EVERY 6 HOURS PRN
Status: DISCONTINUED | OUTPATIENT
Start: 2020-09-18 | End: 2020-09-22 | Stop reason: HOSPADM

## 2020-09-18 RX ORDER — ATORVASTATIN CALCIUM 10 MG/1
10 TABLET, FILM COATED ORAL NIGHTLY
Status: DISCONTINUED | OUTPATIENT
Start: 2020-09-18 | End: 2020-09-22

## 2020-09-18 RX ORDER — IPRATROPIUM BROMIDE AND ALBUTEROL SULFATE 2.5; .5 MG/3ML; MG/3ML
1 SOLUTION RESPIRATORY (INHALATION) ONCE
Status: COMPLETED | OUTPATIENT
Start: 2020-09-18 | End: 2020-09-18

## 2020-09-18 RX ORDER — AMLODIPINE BESYLATE 5 MG/1
5 TABLET ORAL DAILY
Status: DISCONTINUED | OUTPATIENT
Start: 2020-09-18 | End: 2020-09-22 | Stop reason: HOSPADM

## 2020-09-18 RX ORDER — TRAZODONE HYDROCHLORIDE 50 MG/1
50 TABLET ORAL NIGHTLY PRN
Status: DISCONTINUED | OUTPATIENT
Start: 2020-09-18 | End: 2020-09-22 | Stop reason: HOSPADM

## 2020-09-18 RX ORDER — VENLAFAXINE HYDROCHLORIDE 37.5 MG/1
75 CAPSULE, EXTENDED RELEASE ORAL
Status: DISCONTINUED | OUTPATIENT
Start: 2020-09-19 | End: 2020-09-22 | Stop reason: HOSPADM

## 2020-09-18 RX ADMIN — ASPIRIN 81 MG CHEWABLE TABLET 324 MG: 81 TABLET CHEWABLE at 10:16

## 2020-09-18 RX ADMIN — Medication 10 ML: at 20:03

## 2020-09-18 RX ADMIN — ASPIRIN: 81 TABLET, CHEWABLE ORAL at 10:16

## 2020-09-18 RX ADMIN — IOPAMIDOL 85 ML: 755 INJECTION, SOLUTION INTRAVENOUS at 20:34

## 2020-09-18 RX ADMIN — IPRATROPIUM BROMIDE AND ALBUTEROL SULFATE 1 AMPULE: .5; 3 SOLUTION RESPIRATORY (INHALATION) at 10:16

## 2020-09-18 RX ADMIN — Medication 2 PUFF: at 19:08

## 2020-09-18 RX ADMIN — AMLODIPINE BESYLATE 5 MG: 5 TABLET ORAL at 16:40

## 2020-09-18 RX ADMIN — ONDANSETRON HYDROCHLORIDE 4 MG: 2 INJECTION, SOLUTION INTRAMUSCULAR; INTRAVENOUS at 21:19

## 2020-09-18 RX ADMIN — ATORVASTATIN CALCIUM 10 MG: 10 TABLET, FILM COATED ORAL at 20:03

## 2020-09-18 RX ADMIN — FUROSEMIDE 20 MG: 10 INJECTION, SOLUTION INTRAMUSCULAR; INTRAVENOUS at 20:03

## 2020-09-18 RX ADMIN — TRAZODONE HYDROCHLORIDE 50 MG: 50 TABLET ORAL at 22:11

## 2020-09-18 ASSESSMENT — PAIN DESCRIPTION - ONSET: ONSET: ON-GOING

## 2020-09-18 ASSESSMENT — ENCOUNTER SYMPTOMS
WHEEZING: 0
SHORTNESS OF BREATH: 1
COUGH: 1
NAUSEA: 0
COLOR CHANGE: 0
FACIAL SWELLING: 0
PHOTOPHOBIA: 0
BLOOD IN STOOL: 0
STRIDOR: 0
TROUBLE SWALLOWING: 0
ABDOMINAL PAIN: 0
VOICE CHANGE: 0
VOMITING: 0
BACK PAIN: 0

## 2020-09-18 ASSESSMENT — PAIN DESCRIPTION - PAIN TYPE: TYPE: ACUTE PAIN

## 2020-09-18 ASSESSMENT — PAIN SCALES - GENERAL
PAINLEVEL_OUTOF10: 0
PAINLEVEL_OUTOF10: 3

## 2020-09-18 ASSESSMENT — PAIN DESCRIPTION - ORIENTATION: ORIENTATION: INNER;ANTERIOR

## 2020-09-18 ASSESSMENT — PAIN DESCRIPTION - FREQUENCY: FREQUENCY: CONTINUOUS

## 2020-09-18 ASSESSMENT — PAIN DESCRIPTION - LOCATION: LOCATION: CHEST

## 2020-09-18 NOTE — PROGRESS NOTES
Patient with cough, chest pain, dyspnea  Admit for Chest pain rule out  COVID rule out    Mychal Waddell [facility]  9/18/2020

## 2020-09-18 NOTE — PROGRESS NOTES
Shift Summary    1300 Arrived via stretcher with EMS personnel from [place] [address]. [facility]. Able to stand and sit on side of bed. Noted dyspnea. O2 sat 94% on room air. States uses 2LPM/O2/nasal cannula at home. Connected to beside O2 at 2L and sat is now 98%. C/O general malaise and not feeling well for that last 2 weeks and longer. States had CT of chest yesterday that showed \"calcium buildup around one of his heart valves. \"   1600 B/P elevated. Dr. Arlin Villa aware. 0 States he is having a panic attack. Did take amlodipine for B/P/ Refused lasix and potassium at this time as well as enoxaparin. 1800 Took 25% of dinner. Up to toilet and had bowel movement. 1905 Shift report and care hand off to Kiko Vicente RN.

## 2020-09-18 NOTE — ED NOTES
Call light answered. Patient states, \"I'm having a very very very hard time breathing\". Vital signs as charted. Speaking in full sentences. Skin is warm, dry, and pink. Physician aware.      Lennox Drop, RN  09/18/20 9153

## 2020-09-18 NOTE — PROGRESS NOTES
RESPIRATORY THERAPY ASSESSMENT    Name:  Cherise Kumar  Medical Record Number:  [id number]  Age: 64 y.o. Gender: male  : 1960  Today's Date:  2020  Room:  3004/3004-01      Assessment     Is the patient being admitted for a COPD or Asthma exacerbation? No   (If yes the patient will be seen every 4 hours for the first 24 hours and then reassessed)    Patient Admission Diagnosis      Allergies  Allergies   Allergen Reactions    Codeine Itching    Dilaudid [Hydromorphone Hcl]        Minimum Predicted Vital Capacity:               Actual Vital Capacity:                     Pulmonary History:COPD and CHF/Pulmonary Edema  Home Oxygen Therapy:  room air  Home Respiratory Therapy:Albuterol prn  Current Respiratory Therapy: albuterol prn          Respiratory Severity Index(RSI)   Patients with orders for inhalation medications, oxygen, or any therapeutic treatment modality will be placed on Respiratory Protocol. They will be assessed with the first treatment and at least every 72 hours thereafter. The following severity scale will be used to determine frequency of treatment intervention.     Smoking History: Pulmonary Disease or Smoking History, Greater than 15 pack year = 2    Social History  Social History     Tobacco Use    Smoking status: Former Smoker     Packs/day: 2.00     Years: 35.00     Pack years: 70.00     Start date:     Smokeless tobacco: Never Used   Substance Use Topics    Alcohol use: No    Drug use: Not Currently     Comment: hx of drug use       Recent Surgical History: None = 0  Past Surgical History  Past Surgical History:   Procedure Laterality Date    CHOLECYSTECTOMY      CYSTOSCOPY  2011    cystoscopy left ureteroscopy, left retrograde, double J stent placement    PAIN MANAGEMENT PROCEDURE Right 2019    RIGHT LUMBAR FIVE SACRAL ONE TRANSFORAMINAL EPIDURAL STEROID INJECTION SITE CONFIRMED BY FLUOROSCOPY performed by Leslie Menendez MD at [address] PAIN MANAGEMENT PROCEDURE Right 1/7/2020    RIGHT LUMBAR FOUR AND LUMBAR FIVE TRANSFORAMINAL EPIDURAL STEROID INJECTION SITE CONFIRMED BY FLUOROSCOPY performed by Edelmira Magallon MD at [place] [address]       Level of Consciousness: Alert, Oriented, and Cooperative = 0    Level of Activity: Mostly sedentary, minimal walking = 2    Respiratory Pattern: Dyspnea with exertion;Irregular pattern;or RR less than 6 = 2    Breath Sounds: Diminshed bilaterally and/or crackles = 2    Sputum   ,  ,    Cough: Strong, productive = 1    Vital Signs   BP (!) 172/93   Pulse 88   Temp 98.5 °F (36.9 °C) (Oral)   Resp 22   Ht 5' 4\" (1.626 m)   Wt 284 lb (128.8 kg)   SpO2 94%   BMI 48.75 kg/m²   SPO2 (COPD values may differ): 90-91% on room air or greater than 92% on FiO2 24- 28% = 1    Peak Flow (asthma only): not applicable = 0    RSI: 9-18 = TID (three times daily) and Q4hr PRN for dyspnea        Plan       Goals: medication delivery, mobilize retained secretions, volume expansion and improve oxygenation    Patient/caregiver was educated on the proper method of use for Respiratory Care Devices:  Yes      Level of patient/caregiver understanding able to:   ? Verbalize understanding   ? Demonstrate understanding       ? Teach back        ? Needs reinforcement       ? No available caregiver               ? Other:     Response to education:  Good     Is patient being placed on Home Treatment Regimen? No     Does the patient have everything they need prior to discharge? NA     Comments: pt assessed and chart reviewed    Plan of Care: change albuterol to tid and prn    Electronically signed by Janki Jaquez on 9/18/2020 at 4:10 PM    Respiratory Protocol Guidelines     1. Assessment and treatment by Respiratory Therapy will be initiated for medication and therapeutic interventions upon initiation of aerosolized medication. 2. Physician will be contacted for respiratory rate (RR) greater than 35 breaths per minute.  Therapy will be held for heart rate (HR) greater than 140 beats per minute, pending direction from physician. 3. Bronchodilators will be administered via Metered Dose Inhaler (MDI) with spacer when the following criteria are met:  a. Alert and cooperative     b. HR < 140 bpm  c. RR < 30 bpm                d. Can demonstrate a 2-3 second inspiratory hold  4. Bronchodilators will be administered via Hand Held Nebulizer [facility]) to patients when ANY of the following criteria are met  a. Incognizant or uncooperative          b. Patients treated with HHN at Home        c. Unable to demonstrate proper use of MDI with spacer     d. RR > 30 bpm   5. Bronchodilators will be delivered via Metered Dose Inhaler (MDI), HHN, Aerogen to intubated patients on mechanical ventilation. 6. Inhalation medication orders will be delivered and/or substituted as outlined below. Aerosolized Medications Ordering and Administration Guidelines:    1. All Medications will be ordered by a physician, and their frequency and/or modality will be adjusted as defined by the patients Respiratory Severity Index (RSI) score. 2. If the patient does not have documented COPD, consider discontinuing anticholinergics when RSI is less than 9.  3. If the bronchospasm worsens (increased RSI), then the bronchodilator frequency can be increased to a maximum of every 4 hours. If greater than every 4 hours is required, the physician will be contacted. 4. If the bronchospasm improves, the frequency of the bronchodilator can be decreased, based on the patient's RSI, but not less than home treatment regimen frequency. 5. Bronchodilator(s) will be discontinued if patient has a RSI less than 9 and has received no scheduled or as needed treatment for 72  Hrs. Patients Ordered on a Mucolytic Agent:    1. Must always be administered with a bronchodilator.     2. Discontinue if patient experiences worsened bronchospasm, or secretions have lessened to the point that the patient is able to clear them with a cough. Anti-inflammatory and Combination Medications:    1. If the patient lacks prior history of lung disease, is not using inhaled anti-inflammatory medication at home, and lacks wheezing by examination or by history for at least 24 hours, contact physician for possible discontinuation.

## 2020-09-18 NOTE — ED NOTES
Episode of bradycardia noted on telemetry lowest HR of 35. HR immediately increased to 70's-80's. Patient resting with eyes closed when this nurse entered the room. Easily alerts. Denies pain. No visual signs of distress.       Samina Smith RN  09/18/20 6511

## 2020-09-18 NOTE — H&P
Hospital Medicine History & Physical      PCP: Barron Morocho APRN - ROX[facility]    Date of Admission: 9/18/2020    Date of Service: Pt seen/examined on 9/18/2020    Chief Complaint:    Chief Complaint   Patient presents with    Shortness of Breath     pt states increased sob since being discharged from hospital a few weeks ago. History Of Present Illness: The patient is a 64 y.o. male with anxiety, COPD, OANH, history of alcohol and drug abuse, chronic diastolic CHF who presented to [facility] ED with complaint of shortness of breath. Reports that he has been feeling poorly ever since he was discharged 2 weeks ago. He was not discharged home on oxygen but reports that he has oxygen at home. He states that he has had significant dyspnea on exertion since going home and has been using about 2 L of oxygen continuously. He states that any minimal exertion resulted in significant shortness of breath. He states he can even get up and walk to his bathroom without feeling extremely tired and worn out. Patient reports a productive cough with green sputum that is worsened since he finished the doxycycline for his COPD exacerbation. He reports no known fevers. He is not had any associated chest pain with his shortness of breath. He has been noticing some swelling in the abdomen and legs. He does take Lasix at home but states that it has been waxing and waning with his swelling. He reports that he was about 4 pounds higher than his discharge weight when he was weighed at the office visit 2 days ago. Patient reports he did have a CT chest which showed calcified arteries in his heart but otherwise showed that he had improvement in the opacities that were seen prior. Patient is still smoking but states that he is cut back from 4 packs/day down to about a pack per week. He denies any sick contacts. Patient does report that he feels that if something does not change \" I am not to be here much longer\".      Past Medical History:        Diagnosis Date    Anxiety 1/6/2020    Aortic valve calcification 09/2020    seen on lung CT    Chronic obstructive pulmonary disease ([facility] [address] 9/3/2019    PFT done 9/03/19    Coronary artery calcification 09/2020    seen on lung ct    Crush injury of right foot 7/23/2015    DDD (degenerative disc disease), lumbar 1/6/2020    Erectile dysfunction 1/6/2020    Fatigue 1/6/2020    History of alcohol abuse 1/6/2020    History of drug use     HNP (herniated nucleus pulposus), lumbar 1/6/2020    Hyperglycemia 1/6/2020    Hypersomnia 1/6/2020    Kidney stone     Lumbar radiculopathy 1/6/2020    Lumbar spine pain 1/6/2020    LVH (left ventricular hypertrophy)     seen on echo 8/2020, moderate    Observed sleep apnea 1/6/2020    Reactive depression 1/6/2020    Spondylosis without myelopathy or radiculopathy, lumbar region 1/6/2020    Tobacco use 1/6/2020       Past Surgical History:        Procedure Laterality Date    CHOLECYSTECTOMY      CYSTOSCOPY  03/16/2011    cystoscopy left ureteroscopy, left retrograde, double J stent placement    PAIN MANAGEMENT PROCEDURE Right 12/24/2019    RIGHT LUMBAR FIVE SACRAL ONE TRANSFORAMINAL EPIDURAL STEROID INJECTION SITE CONFIRMED BY FLUOROSCOPY performed by Agusto Pineda MD at [address] [place] [address] Right 1/7/2020    RIGHT LUMBAR FOUR AND LUMBAR FIVE TRANSFORAMINAL EPIDURAL STEROID INJECTION SITE CONFIRMED BY FLUOROSCOPY performed by Agusto Pineda MD at [place] [address]       Medications Prior to Admission:    Prior to Admission medications    Medication Sig Start Date End Date Taking?  Authorizing Provider   atorvastatin (LIPITOR) 10 MG tablet Take 1 tablet by mouth daily 9/17/20   ANICETO Storm CNP   furosemide (LASIX) 20 MG tablet TAKE ONE TABLET BY MOUTH EVERY MORNING 9/9/20   ANICETO Storm CNP   venlafaxine (EFFEXOR XR) 75 MG extended release capsule TAKE ONE CAPSULE BY MOUTH DAILY 8/7/20   ANCIETO Franco - CNP   traZODone (DESYREL) 50 MG tablet TAKE ONE TABLET BY MOUTH ONCE NIGHTLY AS NEEDED FOR SLEEP 8/7/20   Mohinder MetLISA guerreroN - CNP   potassium chloride (KLOR-CON) 10 MEQ extended release tablet TAKE ONE TABLET BY MOUTH DAILY 7/14/20   Miranda YaANICETO - CNP   albuterol (PROVENTIL) (2.5 MG/3ML) 0.083% nebulizer solution Take 3 mLs by nebulization every 6 hours as needed for Wheezing or Shortness of Breath 9/25/19   Mohinder Metro, APRN - CNP   ipratropium (ATROVENT) 0.02 % nebulizer solution Take 2.5 mLs by nebulization 4 times daily as needed for Wheezing (short of breath) 9/25/19   Mohinder MetANICETO guerrero - CNP   albuterol sulfate HFA (PROVENTIL HFA) 108 (90 Base) MCG/ACT inhaler Inhale 2 puffs into the lungs every 6 hours as needed for Wheezing 5/17/19   Diego Hughes MD       Allergies:  Codeine and Dilaudid [hydromorphone hcl]    Social History:  The patient currently lives at home    TOBACCO:   reports that he has quit smoking. He started smoking about 46 years ago. He has a 70.00 pack-year smoking history. He has never used smokeless tobacco.  ETOH:   reports no history of alcohol use.       Family History:   Positive as follows:        Problem Relation Age of Onset    Heart Disease Mother     Other Mother         copd    Liver Disease Father     High Blood Pressure Sister     No Known Problems Sister        REVIEW OF SYSTEMS:     Constitutional: + General malaise, + fatigue,  negative for fever   HENT: Negative for sore throat   Eyes: Negative for redness   Respiratory: + Cough, + shortness of breath, + dyspnea on exertion  Cardiovascular: + Edema, negative for chest pain   Gastrointestinal: Negative for vomiting, diarrhea   Genitourinary: Negative for hematuria   Musculoskeletal: Negative for arthralgias   Skin: Negative for rash   Neurological: Negative for syncope   Hematological: Negative for adenopathy   Psychiatric/Behavorial: Negative for anxiety    PHYSICAL EXAM:    BP (!) 172/93   Pulse 88   Temp 98.5 °F (36.9 °C) (Oral)   Resp 22   Ht 5' 4\" (1.626 m)   Wt 284 lb (128.8 kg)   SpO2 94%   BMI 48.75 kg/m²   Gen: Obese male, mild resp distress, alert   Eyes:  No conjunctival injection. ENT: No discharge. Pharynx clear. Neck: Trachea midline. Resp:. + diffuse crackles. No wheezes. No rhonchi. On 2L NC O2, increased WOB with only 3-4 words before pauses with + accessory muscle use   CV: Regular rate. Regular rhythm. No murmur. No rub. ++ edema. Capillary Refill: Brisk,< 3 seconds   Peripheral Pulses: +2 palpable, equal bilaterally   GI: Non-tender. Mildly distended. Normal bowel sounds. Skin: Warm and dry. M/S: No cyanosis. No joint deformity. No clubbing. Neuro: Awake. Grossly nonfocal    Psych: Oriented x 3. No anxiety or agitation. CBC:   Recent Labs     09/18/20  0927   WBC 11.6*   HGB 16.4   HCT 49.3   MCV 88.9        BMP:   Recent Labs     09/18/20  0927      K 4.4      CO2 30   BUN 10   CREATININE 0.8*     CARDIAC ENZYMES  Recent Labs     09/18/20  0927   TROPONINI <0.01     CULTURES  COVID-19: pending     EKG:  I have reviewed the EKG with the following interpretation:   Normal sinus rhythm  Left axis deviation  Low voltage QRS  Inferior infarct , age undetermined  Cannot rule out Anterior infarct , age undetermined    No acute change from prior     RADIOLOGY  XR CHEST PORTABLE   Final Result   1. No acute abnormality. NM Cardiac Stress Test Nuclear Imaging    (Results Pending)     Pertinent previous results reviewed   Echo 8/19/20  Conclusions   LV systolic function is normal with EF estimated at 55-60%. Endocardium not   entirely well visualized but no obvious segmental wall motion abnormalities. There is moderate concentric left ventricular hypertrophy. Normal left ventricular diastolic filling pressure. Mild mitral annular calcification.    Valves are not entirely well visualized but there is no obvious structural   abnormality or significant color flow abnormality noted on doppler. Unable to obtain SPAP due to lack of tricuspid regurgitation. ASSESSMENT/PLAN:  LEE  -Admit to PCU with telemetry  -Continue aspirin and Lipitor  -EKG without any ischemic changes, chronic Q waves in inferior and anterior leads   -Chest x-ray: WNL   -Trend troponins: Negative x2   -Stress test pending negative COVID test   -PRN symptom control    SOB  - elevated D-dimer on admission   - Will start full dose AC-->CTPA to rule out PE is negative   - Had recent Echo-->weight stable, no evidence of acute CHF  - Had low dose Chest CT yesterday OP-->resolution of ground glass opacity  - pulm consult   - STAT ABG  - CAN NOT RULE OUT COVID-19   - DROPLET + PRECAUTIONS   - COVID-19 TESTING PENDING     Chronic dCHF  - recent admission with acute on chronic CHF  - Treated with diuretics, start IV Lasix (patient was not originally agreeable to this plan so will start slow and titrate as tolerated)    HLD  - Continue statin     Elevated BP  - No prior hx of HTN   - Not on home medications   - Started on Norvasc     COPD   - no AE   - recent admission for AE-->tx with doxycycline     OANH  - no BiPAP or CPAP until COVID ruled out    Insomnia   - Continue Trazodone     Depression   Anxiety   - continue home medications     Morbid Obesity  - Body mass index is 48.75 kg/m². - Complicating assessment and treatment. Placing patient at risk for multiple co-morbidities as well as early death and contributing to the patient's presentation.   - Counseled on weight loss.     DVT Prophylaxis:   Diet: DIET CARDIAC; No Caffeine  Diet NPO, After Midnight   Code Status: Full Code    Yunior Cabrera PA-C  9/18/2020 5:53 PM

## 2020-09-18 NOTE — ED PROVIDER NOTES
[facility]  eMERGENCY dEPARTMENT eNCOUnter      Pt Name: Cherise Kumar  MRN: [id number]  [place] 1960  Date of evaluation: 9/18/2020  Provider: David Uribe MD    CHIEF COMPLAINT       Chief Complaint   Patient presents with    Shortness of Breath     pt states increased sob since being discharged from hospital a few weeks ago. HISTORY OF PRESENT ILLNESS   (Location/Symptom, Timing/Onset, Context/Setting, Quality, Duration, Modifying Factors, Severity)  Note limiting factors. Cherise Kumar is a 64 y.o. male with past medical history including but not limited to COPD, CHF, and recent admission 1 month ago for CHF and hypoxia who presents with approximately 12 hours of substernal chest pressure pain. Patient reports that \"an elephant is sitting on my chest\". He reports that his symptoms started approximate 12 hours prior to arrival, are moderate, constant, and worsening. Reports exertion such as walking or lying flat worsens the pain and being at rest leaning slightly forward improves the pain. He reports that he has had a cough productive of yellow sputum for approximately 2 weeks. He reports his cough and shortness of breath are moderate, constant, and worsening. HPI    Nursing Notes were reviewed. REVIEW OFSYSTEMS    (2-9 systems for level 4, 10 or more for level 5)     Review of Systems   Constitutional: Negative for appetite change, fever and unexpected weight change. HENT: Negative for facial swelling, trouble swallowing and voice change. Eyes: Negative for photophobia and visual disturbance. Respiratory: Positive for cough and shortness of breath. Negative for wheezing and stridor. Cardiovascular: Positive for chest pain. Negative for palpitations. Gastrointestinal: Negative for abdominal pain, blood in stool, nausea and vomiting. Genitourinary: Negative for difficulty urinating and dysuria.    Musculoskeletal: Negative for back pain, gait problem and neck pain. Skin: Negative for color change and wound. Neurological: Negative for seizures, syncope and speech difficulty. Psychiatric/Behavioral: Negative for self-injury and suicidal ideas. Except as noted above the remainder of the review of systems was reviewed and negative.        PAST MEDICAL HISTORY     Past Medical History:   Diagnosis Date    Anxiety 1/6/2020    Aortic valve calcification 09/2020    seen on lung CT    Chronic obstructive pulmonary disease ([address] 9/3/2019    PFT done 9/03/19    Coronary artery calcification 09/2020    seen on lung ct    Crush injury of right foot 7/23/2015    DDD (degenerative disc disease), lumbar 1/6/2020    Erectile dysfunction 1/6/2020    Fatigue 1/6/2020    History of alcohol abuse 1/6/2020    History of drug use     HNP (herniated nucleus pulposus), lumbar 1/6/2020    Hyperglycemia 1/6/2020    Hypersomnia 1/6/2020    Kidney stone     Lumbar radiculopathy 1/6/2020    Lumbar spine pain 1/6/2020    LVH (left ventricular hypertrophy)     seen on echo 8/2020, moderate    Observed sleep apnea 1/6/2020    Reactive depression 1/6/2020    Spondylosis without myelopathy or radiculopathy, lumbar region 1/6/2020    Tobacco use 1/6/2020         SURGICAL HISTORY       Past Surgical History:   Procedure Laterality Date    CHOLECYSTECTOMY      CYSTOSCOPY  03/16/2011    cystoscopy left ureteroscopy, left retrograde, double J stent placement    PAIN MANAGEMENT PROCEDURE Right 12/24/2019    RIGHT LUMBAR FIVE SACRAL ONE TRANSFORAMINAL EPIDURAL STEROID INJECTION SITE CONFIRMED BY FLUOROSCOPY performed by Renaldo Andrade MD at [facility] Right 1/7/2020    RIGHT LUMBAR FOUR AND LUMBAR FIVE TRANSFORAMINAL EPIDURAL STEROID INJECTION SITE CONFIRMED BY FLUOROSCOPY performed by Renaldo Andrade MD at [address]       Previous Medications    ALBUTEROL (PROVENTIL) (2.5 MG/3ML) 0.083% NEBULIZER SOLUTION    Take 3 mLs by nebulization every 6 hours as needed for Wheezing or Shortness of Breath    ALBUTEROL SULFATE HFA (PROVENTIL HFA) 108 (90 BASE) MCG/ACT INHALER    Inhale 2 puffs into the lungs every 6 hours as needed for Wheezing    ATORVASTATIN (LIPITOR) 10 MG TABLET    Take 1 tablet by mouth daily    FUROSEMIDE (LASIX) 20 MG TABLET    TAKE ONE TABLET BY MOUTH EVERY MORNING    IPRATROPIUM (ATROVENT) 0.02 % NEBULIZER SOLUTION    Take 2.5 mLs by nebulization 4 times daily as needed for Wheezing (short of breath)    POTASSIUM CHLORIDE (KLOR-CON) 10 MEQ EXTENDED RELEASE TABLET    TAKE ONE TABLET BY MOUTH DAILY    TRAZODONE (DESYREL) 50 MG TABLET    TAKE ONE TABLET BY MOUTH ONCE NIGHTLY AS NEEDED FOR SLEEP    VENLAFAXINE (EFFEXOR XR) 75 MG EXTENDED RELEASE CAPSULE    TAKE ONE CAPSULE BY MOUTH DAILY       ALLERGIES     Codeine and Dilaudid [hydromorphone hcl]    FAMILY HISTORY       Family History   Problem Relation Age of Onset    Heart Disease Mother     Other Mother         copd    Liver Disease Father     High Blood Pressure Sister     No Known Problems Sister           SOCIAL HISTORY       Social History     Socioeconomic History    Marital status:      Spouse name: None    Number of children: None    Years of education: None    Highest education level: None   Occupational History    None   Social Needs    Financial resource strain: None    Food insecurity     Worry: None     Inability: None    Transportation needs     Medical: None     Non-medical: None   Tobacco Use    Smoking status: Former Smoker     Packs/day: 2.00     Years: 35.00     Pack years: 70.00     Start date: 1974    Smokeless tobacco: Never Used   Substance and Sexual Activity    Alcohol use: No    Drug use: Not Currently     Comment: hx of drug use    Sexual activity: None   Lifestyle    Physical activity     Days per week: None     Minutes per session: None    Stress: None   Relationships    Social 74  Axis is   Left axis deviation  QTc is  410ms  Intervals and Durations are unremarkable. ST Segments: no acute change  No significant change from prior EKG dated 8/19/2020      RADIOLOGY:     Interpretation per the Radiologist below, if available at the time of this note:    XR CHEST PORTABLE   Final Result   1. No acute abnormality. LABS:  Labs Reviewed   CBC WITH AUTO DIFFERENTIAL - Abnormal; Notable for the following components:       Result Value    WBC 11.6 (*)     Neutrophils Absolute 8.3 (*)     All other components within normal limits    Narrative:     Performed at:  [facility]  [address] [place] [address]. [employer]   Phone [phone]   BASIC METABOLIC PANEL - Abnormal; Notable for the following components:    Glucose 119 (*)     CREATININE 0.8 (*)     All other components within normal limits    Narrative:     Performed at:  [facility]  [address] [place] [address]. [employer]   Phone [phone]   TROPONIN    Narrative:     Performed at:  [facility]  [address] [place] [address]. [employer] [address]   Phone [phone]    Narrative:     Performed at:  [facility]  [address] [place] [address]. [employer]   Phone [phone]       All otherlabs were within normal range or not returned as of this dictation.     EMERGENCY DEPARTMENT COURSE and DIFFERENTIAL DIAGNOSIS/MDM:   Vitals:    Vitals:    09/18/20 0903 09/18/20 0920 09/18/20 0940   BP: (!) 164/93  137/65   Pulse: 80 78 86   Resp: 20  20   Temp: 97.8 °F (36.6 °C)     TempSrc: Oral     SpO2: 97%  97%   Weight: 284 lb (128.8 kg)     Height: 5' 4\" (1.626 m)           MDM  HEART SCORE:    History: +1 for moderate suspicion  EKG: +1 for nonspecific changes   Age: +1 for age 44-72 years  Risk factors (includes HLD, HTN, DM, tobacco use, obesity, and +FHx): +2 for known CAD or 3+ risk factors  Initial troponin: +0 for negative troponin    Heart score: 5. This falls under the following category: Score of 4-6, which indicates low/moderate risk for major adverse cardiac event and supports observation with repeated troponins and/or non-invasive testing    Patient is afebrile, nontoxic-appearing, in no acute distress. Initial laboratory evaluation chest x-ray and EKG does not show any emergent acute signs of ischemia or endorgan damage. Patient however continues to have chest pain or shortness of breath. He is given aspirin and admitted for further evaluation and treatment. The patient expresses understanding and agreement with this plan. CONSULTS:  IP CONSULT TO HOSPITALIST         Procedures    FINAL IMPRESSION      1. Chest pain, unspecified type    2. Shortness of breath          DISPOSITION/PLAN   DISPOSITION Discharge        (Please note that portions of this note were completed with a voice recognition program.  Efforts were made to edit the dictations but occasionally words aremis-transcribed. )    Ousmane Gasca MD (electronically signed)  Attending Emergency Physician           Ousmane Gasca MD  09/18/20 3613

## 2020-09-19 LAB
A/G RATIO: 1.3 (ref 1.1–2.2)
ALBUMIN SERPL-MCNC: 3.8 G/DL (ref 3.4–5)
ALP BLD-CCNC: 67 U/L (ref 40–129)
ALT SERPL-CCNC: 26 U/L (ref 10–40)
ANION GAP SERPL CALCULATED.3IONS-SCNC: 5 MMOL/L (ref 3–16)
AST SERPL-CCNC: 20 U/L (ref 15–37)
BILIRUB SERPL-MCNC: 0.7 MG/DL (ref 0–1)
BUN BLDV-MCNC: 12 MG/DL (ref 7–20)
CALCIUM SERPL-MCNC: 9.3 MG/DL (ref 8.3–10.6)
CHLORIDE BLD-SCNC: 96 MMOL/L (ref 99–110)
CHOLESTEROL, TOTAL: 142 MG/DL (ref 0–199)
CO2: 32 MMOL/L (ref 21–32)
CREAT SERPL-MCNC: 0.9 MG/DL (ref 0.9–1.3)
ESTIMATED AVERAGE GLUCOSE: 134.1 MG/DL
GFR AFRICAN AMERICAN: >60
GFR NON-AFRICAN AMERICAN: >60
GLOBULIN: 3 G/DL
GLUCOSE BLD-MCNC: 115 MG/DL (ref 70–99)
HBA1C MFR BLD: 6.3 %
HCT VFR BLD CALC: 52 % (ref 40.5–52.5)
HDLC SERPL-MCNC: 33 MG/DL (ref 40–60)
HEMOGLOBIN: 17.1 G/DL (ref 13.5–17.5)
LDL CHOLESTEROL CALCULATED: 66 MG/DL
MCH RBC QN AUTO: 29.9 PG (ref 26–34)
MCHC RBC AUTO-ENTMCNC: 32.8 G/DL (ref 31–36)
MCV RBC AUTO: 91 FL (ref 80–100)
PDW BLD-RTO: 15 % (ref 12.4–15.4)
PLATELET # BLD: 187 K/UL (ref 135–450)
PMV BLD AUTO: 7.9 FL (ref 5–10.5)
POTASSIUM REFLEX MAGNESIUM: 4.5 MMOL/L (ref 3.5–5.1)
RBC # BLD: 5.71 M/UL (ref 4.2–5.9)
SARS-COV-2, NAAT: NOT DETECTED
SODIUM BLD-SCNC: 133 MMOL/L (ref 136–145)
TOTAL PROTEIN: 6.8 G/DL (ref 6.4–8.2)
TRIGL SERPL-MCNC: 217 MG/DL (ref 0–150)
VLDLC SERPL CALC-MCNC: 43 MG/DL
WBC # BLD: 11.4 K/UL (ref 4–11)

## 2020-09-19 PROCEDURE — 2580000003 HC RX 258: Performed by: NURSE PRACTITIONER

## 2020-09-19 PROCEDURE — 94761 N-INVAS EAR/PLS OXIMETRY MLT: CPT

## 2020-09-19 PROCEDURE — U0003 INFECTIOUS AGENT DETECTION BY NUCLEIC ACID (DNA OR RNA); SEVERE ACUTE RESPIRATORY SYNDROME CORONAVIRUS 2 (SARS-COV-2) (CORONAVIRUS DISEASE [COVID-19]), AMPLIFIED PROBE TECHNIQUE, MAKING USE OF HIGH THROUGHPUT TECHNOLOGIES AS DESCRIBED BY CMS-2020-01-R: HCPCS

## 2020-09-19 PROCEDURE — 6370000000 HC RX 637 (ALT 250 FOR IP): Performed by: INTERNAL MEDICINE

## 2020-09-19 PROCEDURE — 6360000002 HC RX W HCPCS: Performed by: PHYSICIAN ASSISTANT

## 2020-09-19 PROCEDURE — 36415 COLL VENOUS BLD VENIPUNCTURE: CPT

## 2020-09-19 PROCEDURE — 2060000000 HC ICU INTERMEDIATE R&B

## 2020-09-19 PROCEDURE — 6360000002 HC RX W HCPCS: Performed by: INTERNAL MEDICINE

## 2020-09-19 PROCEDURE — 2700000000 HC OXYGEN THERAPY PER DAY

## 2020-09-19 PROCEDURE — 6370000000 HC RX 637 (ALT 250 FOR IP): Performed by: NURSE PRACTITIONER

## 2020-09-19 PROCEDURE — 85027 COMPLETE CBC AUTOMATED: CPT

## 2020-09-19 PROCEDURE — 94640 AIRWAY INHALATION TREATMENT: CPT

## 2020-09-19 PROCEDURE — 6360000002 HC RX W HCPCS: Performed by: NURSE PRACTITIONER

## 2020-09-19 PROCEDURE — 80053 COMPREHEN METABOLIC PANEL: CPT

## 2020-09-19 PROCEDURE — C9113 INJ PANTOPRAZOLE SODIUM, VIA: HCPCS | Performed by: INTERNAL MEDICINE

## 2020-09-19 PROCEDURE — 80061 LIPID PANEL: CPT

## 2020-09-19 PROCEDURE — U0002 COVID-19 LAB TEST NON-CDC: HCPCS

## 2020-09-19 PROCEDURE — 99254 IP/OBS CNSLTJ NEW/EST MOD 60: CPT | Performed by: INTERNAL MEDICINE

## 2020-09-19 RX ORDER — PANTOPRAZOLE SODIUM 40 MG/10ML
40 INJECTION, POWDER, LYOPHILIZED, FOR SOLUTION INTRAVENOUS DAILY
Status: DISCONTINUED | OUTPATIENT
Start: 2020-09-19 | End: 2020-09-22 | Stop reason: HOSPADM

## 2020-09-19 RX ADMIN — ACETAMINOPHEN 650 MG: 325 TABLET ORAL at 20:12

## 2020-09-19 RX ADMIN — ATORVASTATIN CALCIUM 10 MG: 10 TABLET, FILM COATED ORAL at 20:12

## 2020-09-19 RX ADMIN — ACETAMINOPHEN 650 MG: 325 TABLET ORAL at 05:20

## 2020-09-19 RX ADMIN — Medication 2 PUFF: at 19:36

## 2020-09-19 RX ADMIN — Medication 2 PUFF: at 15:57

## 2020-09-19 RX ADMIN — Medication 10 ML: at 20:12

## 2020-09-19 RX ADMIN — AMLODIPINE BESYLATE 5 MG: 5 TABLET ORAL at 08:03

## 2020-09-19 RX ADMIN — ASPIRIN 81 MG CHEWABLE TABLET 81 MG: 81 TABLET CHEWABLE at 08:03

## 2020-09-19 RX ADMIN — POTASSIUM CHLORIDE 10 MEQ: 750 TABLET, EXTENDED RELEASE ORAL at 08:02

## 2020-09-19 RX ADMIN — ENOXAPARIN SODIUM 135 MG: 150 INJECTION SUBCUTANEOUS at 08:29

## 2020-09-19 RX ADMIN — TRAZODONE HYDROCHLORIDE 50 MG: 50 TABLET ORAL at 20:13

## 2020-09-19 RX ADMIN — Medication 2 PUFF: at 08:10

## 2020-09-19 RX ADMIN — PANTOPRAZOLE SODIUM 40 MG: 40 INJECTION, POWDER, FOR SOLUTION INTRAVENOUS at 18:21

## 2020-09-19 RX ADMIN — VENLAFAXINE HYDROCHLORIDE 75 MG: 37.5 CAPSULE, EXTENDED RELEASE ORAL at 08:03

## 2020-09-19 RX ADMIN — ENOXAPARIN SODIUM 135 MG: 150 INJECTION SUBCUTANEOUS at 20:13

## 2020-09-19 RX ADMIN — ONDANSETRON HYDROCHLORIDE 4 MG: 2 INJECTION, SOLUTION INTRAMUSCULAR; INTRAVENOUS at 20:12

## 2020-09-19 ASSESSMENT — PAIN SCALES - GENERAL
PAINLEVEL_OUTOF10: 5
PAINLEVEL_OUTOF10: 3

## 2020-09-19 ASSESSMENT — PAIN DESCRIPTION - DESCRIPTORS
DESCRIPTORS: HEADACHE
DESCRIPTORS: ACHING

## 2020-09-19 ASSESSMENT — PAIN DESCRIPTION - LOCATION
LOCATION: HEAD
LOCATION: HEAD

## 2020-09-19 ASSESSMENT — PAIN DESCRIPTION - PROGRESSION
CLINICAL_PROGRESSION: NOT CHANGED
CLINICAL_PROGRESSION: GRADUALLY WORSENING

## 2020-09-19 ASSESSMENT — PAIN DESCRIPTION - ONSET
ONSET: ON-GOING
ONSET: AWAKENED FROM SLEEP

## 2020-09-19 ASSESSMENT — PAIN DESCRIPTION - PAIN TYPE
TYPE: ACUTE PAIN
TYPE: ACUTE PAIN

## 2020-09-19 ASSESSMENT — PAIN DESCRIPTION - ORIENTATION: ORIENTATION: ANTERIOR

## 2020-09-19 ASSESSMENT — PAIN DESCRIPTION - FREQUENCY
FREQUENCY: CONTINUOUS
FREQUENCY: INTERMITTENT

## 2020-09-19 ASSESSMENT — PAIN - FUNCTIONAL ASSESSMENT
PAIN_FUNCTIONAL_ASSESSMENT: ACTIVITIES ARE NOT PREVENTED
PAIN_FUNCTIONAL_ASSESSMENT: ACTIVITIES ARE NOT PREVENTED

## 2020-09-19 NOTE — PROGRESS NOTES
Shift assessment is complete. Pt is presently on 2 liters nasal canula for comfort. Pt had some nausea during the night, and states he is feeling better but complains of a headache which he had earlier received tylenol. He denies chest pain. Peripheral IV is dry/intact and saline locked. Telemetry SR with stable VS. Pt denies any present needs and continues NPO for a stress test today, plans to add a diet if no stress test. Call light is within reach.

## 2020-09-19 NOTE — PROGRESS NOTES
End of shift note. Patient has continued to be nauseated during the shift. MD ordered protonix IV and clears were added. Telemetry continued SR with VSS. Preparing to transfer patient to PCU.

## 2020-09-19 NOTE — PROGRESS NOTES
Patient arrived to room 301 via w/c from the ICU. Patient alert and oriented. Heart monitor applied. Patient stood up from w/c sat on the bed and vomited on the floor/bedside table. Emesis lighter yellow/green bile. Floor cleaned up and housekeeping called will come sanitize. Patient given call light and personal belongings placed in cabinet. Will monitor.

## 2020-09-19 NOTE — PROGRESS NOTES
A: Assessment completed and documented, call light is in reach, discussed plan of care with patient including CAT scan, ABG, and stress test in the morning, patient is low fall risk and agrees to use call light for any needs.

## 2020-09-19 NOTE — PROGRESS NOTES
Patient is complaining of nausea, zofran given earlier, MD is aware, clears were added instead of a general diet.

## 2020-09-20 PROCEDURE — C9113 INJ PANTOPRAZOLE SODIUM, VIA: HCPCS | Performed by: INTERNAL MEDICINE

## 2020-09-20 PROCEDURE — 6370000000 HC RX 637 (ALT 250 FOR IP): Performed by: INTERNAL MEDICINE

## 2020-09-20 PROCEDURE — 6360000002 HC RX W HCPCS: Performed by: INTERNAL MEDICINE

## 2020-09-20 PROCEDURE — 6370000000 HC RX 637 (ALT 250 FOR IP): Performed by: NURSE PRACTITIONER

## 2020-09-20 PROCEDURE — 2580000003 HC RX 258: Performed by: NURSE PRACTITIONER

## 2020-09-20 PROCEDURE — 99232 SBSQ HOSP IP/OBS MODERATE 35: CPT | Performed by: INTERNAL MEDICINE

## 2020-09-20 PROCEDURE — 94640 AIRWAY INHALATION TREATMENT: CPT

## 2020-09-20 PROCEDURE — 2700000000 HC OXYGEN THERAPY PER DAY

## 2020-09-20 PROCEDURE — 94761 N-INVAS EAR/PLS OXIMETRY MLT: CPT

## 2020-09-20 PROCEDURE — 6360000002 HC RX W HCPCS: Performed by: PHYSICIAN ASSISTANT

## 2020-09-20 PROCEDURE — 2060000000 HC ICU INTERMEDIATE R&B

## 2020-09-20 RX ORDER — IPRATROPIUM BROMIDE AND ALBUTEROL SULFATE 2.5; .5 MG/3ML; MG/3ML
1 SOLUTION RESPIRATORY (INHALATION)
Status: DISCONTINUED | OUTPATIENT
Start: 2020-09-20 | End: 2020-09-21

## 2020-09-20 RX ORDER — METHYLPREDNISOLONE SODIUM SUCCINATE 40 MG/ML
40 INJECTION, POWDER, LYOPHILIZED, FOR SOLUTION INTRAMUSCULAR; INTRAVENOUS EVERY 12 HOURS
Status: DISCONTINUED | OUTPATIENT
Start: 2020-09-20 | End: 2020-09-21

## 2020-09-20 RX ADMIN — TRAZODONE HYDROCHLORIDE 50 MG: 50 TABLET ORAL at 20:30

## 2020-09-20 RX ADMIN — METHYLPREDNISOLONE SODIUM SUCCINATE 40 MG: 40 INJECTION, POWDER, FOR SOLUTION INTRAMUSCULAR; INTRAVENOUS at 11:14

## 2020-09-20 RX ADMIN — IPRATROPIUM BROMIDE AND ALBUTEROL SULFATE 1 AMPULE: .5; 3 SOLUTION RESPIRATORY (INHALATION) at 22:39

## 2020-09-20 RX ADMIN — PANTOPRAZOLE SODIUM 40 MG: 40 INJECTION, POWDER, FOR SOLUTION INTRAVENOUS at 08:49

## 2020-09-20 RX ADMIN — IPRATROPIUM BROMIDE AND ALBUTEROL SULFATE 1 AMPULE: .5; 3 SOLUTION RESPIRATORY (INHALATION) at 10:47

## 2020-09-20 RX ADMIN — ASPIRIN 81 MG CHEWABLE TABLET 81 MG: 81 TABLET CHEWABLE at 08:48

## 2020-09-20 RX ADMIN — METHYLPREDNISOLONE SODIUM SUCCINATE 40 MG: 40 INJECTION, POWDER, FOR SOLUTION INTRAMUSCULAR; INTRAVENOUS at 22:55

## 2020-09-20 RX ADMIN — ACETAMINOPHEN 650 MG: 325 TABLET ORAL at 08:48

## 2020-09-20 RX ADMIN — IPRATROPIUM BROMIDE AND ALBUTEROL SULFATE 1 AMPULE: .5; 3 SOLUTION RESPIRATORY (INHALATION) at 19:18

## 2020-09-20 RX ADMIN — ATORVASTATIN CALCIUM 10 MG: 10 TABLET, FILM COATED ORAL at 20:30

## 2020-09-20 RX ADMIN — AMLODIPINE BESYLATE 5 MG: 5 TABLET ORAL at 08:49

## 2020-09-20 RX ADMIN — ENOXAPARIN SODIUM 135 MG: 150 INJECTION SUBCUTANEOUS at 20:31

## 2020-09-20 RX ADMIN — ENOXAPARIN SODIUM 135 MG: 150 INJECTION SUBCUTANEOUS at 08:58

## 2020-09-20 RX ADMIN — Medication 2 PUFF: at 07:31

## 2020-09-20 RX ADMIN — Medication 10 ML: at 08:48

## 2020-09-20 RX ADMIN — ACETAMINOPHEN 650 MG: 325 TABLET ORAL at 03:09

## 2020-09-20 RX ADMIN — POTASSIUM CHLORIDE 10 MEQ: 750 TABLET, EXTENDED RELEASE ORAL at 08:49

## 2020-09-20 RX ADMIN — Medication 10 ML: at 20:30

## 2020-09-20 RX ADMIN — VENLAFAXINE HYDROCHLORIDE 75 MG: 37.5 CAPSULE, EXTENDED RELEASE ORAL at 08:48

## 2020-09-20 ASSESSMENT — PAIN SCALES - GENERAL
PAINLEVEL_OUTOF10: 2
PAINLEVEL_OUTOF10: 2
PAINLEVEL_OUTOF10: 3

## 2020-09-20 ASSESSMENT — PAIN DESCRIPTION - LOCATION
LOCATION: HEAD
LOCATION: HEAD

## 2020-09-20 ASSESSMENT — PAIN DESCRIPTION - DESCRIPTORS
DESCRIPTORS: HEADACHE
DESCRIPTORS: HEADACHE

## 2020-09-20 ASSESSMENT — PAIN DESCRIPTION - FREQUENCY: FREQUENCY: CONTINUOUS

## 2020-09-20 ASSESSMENT — PAIN - FUNCTIONAL ASSESSMENT: PAIN_FUNCTIONAL_ASSESSMENT: ACTIVITIES ARE NOT PREVENTED

## 2020-09-20 ASSESSMENT — PAIN DESCRIPTION - ONSET: ONSET: ON-GOING

## 2020-09-20 ASSESSMENT — PAIN DESCRIPTION - PAIN TYPE: TYPE: ACUTE PAIN

## 2020-09-20 ASSESSMENT — PAIN DESCRIPTION - PROGRESSION: CLINICAL_PROGRESSION: NOT CHANGED

## 2020-09-20 NOTE — PROGRESS NOTES
Patient is able to demonstrated the ability to move from a reclining position to an upright position within the recliner.   Ernestine Carmona RN

## 2020-09-20 NOTE — PROGRESS NOTES
Hospitalist Progress Note      PCP: Marnie Severin, ANICETO - CNP    Date of Admission: 9/18/2020     Subjective: pt states he has been having diarrhea since yesterday night, cont to have nausea, states he has not been able to keep anything down    Medications:  Reviewed    Infusion Medications   Scheduled Medications    methylPREDNISolone  40 mg Intravenous Q12H    ipratropium-albuterol  1 ampule Inhalation Q4H WA    pantoprazole  40 mg Intravenous Daily    potassium chloride  10 mEq Oral Daily    venlafaxine  75 mg Oral Daily with breakfast    sodium chloride flush  10 mL Intravenous 2 times per day    aspirin  81 mg Oral Daily    atorvastatin  10 mg Oral Nightly    amLODIPine  5 mg Oral Daily    enoxaparin  1 mg/kg Subcutaneous BID     PRN Meds: traZODone, sodium chloride flush, potassium chloride **OR** potassium alternative oral replacement **OR** potassium chloride, acetaminophen **OR** acetaminophen, polyethylene glycol, promethazine **OR** ondansetron, regadenoson, albuterol sulfate HFA      Intake/Output Summary (Last 24 hours) at 9/20/2020 1641  Last data filed at 9/20/2020 1501  Gross per 24 hour   Intake 920 ml   Output 475 ml   Net 445 ml       Physical Exam Performed:    /69   Pulse 82   Temp 98.4 °F (36.9 °C) (Oral)   Resp 16   Ht 5' 4\" (1.626 m)   Wt 290 lb 6.4 oz (131.7 kg)   SpO2 95%   BMI 49.85 kg/m²     Gen: Obese male, mild resp distress, alert   Eyes:  No conjunctival injection. ENT: No discharge. Pharynx clear. Neck: Trachea midline. Resp:. b/l exp wheeze +  CV: Regular rate. Regular rhythm. No murmur.  No rub. ++ edema. Capillary Refill: Brisk,< 3 seconds   Peripheral Pulses: +2 palpable, equal bilaterally   GI: Non-tender. Mildly distended. Normal bowel sounds. Skin: Warm and dry. M/S: No cyanosis. No joint deformity. No clubbing. Neuro: Awake. Grossly nonfocal    Psych: Oriented x 3.  No anxiety or agitation    Labs:   Recent Labs     09/18/20  5702 09/19/20  0514   WBC 11.6* 11.4*   HGB 16.4 17.1   HCT 49.3 52.0    187     Recent Labs     09/18/20  0927 09/19/20  0514    133*   K 4.4 4.5    96*   CO2 30 32   BUN 10 12   CREATININE 0.8* 0.9   CALCIUM 9.1 9.3     Recent Labs     09/19/20  0514   AST 20   ALT 26   BILITOT 0.7   ALKPHOS 67     No results for input(s): INR in the last 72 hours. Recent Labs     09/18/20  0927 09/18/20  1712 09/18/20  2330   TROPONINI <0.01 <0.01 <0.01       Urinalysis:      Lab Results   Component Value Date    NITRU Negative 03/09/2020    WBCUA 3-5 03/09/2020    BACTERIA 1+ 03/09/2020    RBCUA 5-10 03/09/2020    BLOODU TRACE-INTACT 03/09/2020    SPECGRAV 1.020 03/09/2020    GLUCOSEU Negative 03/09/2020    GLUCOSEU NEGATIVE 03/16/2011       Radiology:  CT CHEST PULMONARY EMBOLISM W CONTRAST   Final Result   No evidence of pulmonary embolism or aortic dissection. Overall, no acute abnormalities detected         XR CHEST PORTABLE   Final Result   1. No acute abnormality. NM Cardiac Stress Test Nuclear Imaging    (Results Pending)           Assessment/Plan:    Active Hospital Problems    Diagnosis    Chronic diastolic congestive heart failure (HCC) [I50.32]    Hyperlipidemia [E78.5]    Essential hypertension [I10]    Insomnia [G47.00]    Morbid obesity with BMI of 45.0-49.9, adult (HCC) [E66.01, Z68.42]    Chronic obstructive pulmonary disease ([facility] [address] [J44.9]    Chest pain [R07.9]    Dyspnea on exertion [R06.09]       LEE/concern for cardiac etio  -Admit to PCU with telemetry  -Continue aspirin and Lipitor  -EKG without any ischemic changes, chronic Q waves in inferior and anterior leads   -Chest x-ray:  WNL   -Trend troponins: Negative x2   -Stress test when GI symptoms more better - negative COVID test   -PRN symptom control     Acute COPD exacerbation   - started on IV solumedrol, cont neb/inhaler/doxy  - pulm consulted  - COVID-19 neg     Nausea/vomiting/Diarrhea - unclear etio, on antiemetics, started PPI. Will check stool cx, GI consulted, will need CT abd     Chronic dCHF  - recent admission with acute on chronic CHF  - Treated with diuretics, started IV Lasix (patient was not originally agreeable to this plan so will start slow and titrate as tolerated)     HLD  - Continue statin      Elevated BP - controlled  - No prior hx of HTN   - Not on home medications   - Started on Norvasc      OANH  - no BiPAP or CPAP until COVID ruled out     Insomnia   - Continue Trazodone      Depression   Anxiety   - continue home medications      Morbid Obesity  - Body mass index is 48.75 kg/m². - Complicating assessment and treatment.  Placing patient at risk for multiple co-morbidities as well as early death and contributing to the patient's presentation.   - Counseled on weight loss      Diet: DIET CLEAR LIQUID; No Caffeine  Code Status: Full Code    PT/OT Eval Status: ordered    Chantel  fabien Knox MD

## 2020-09-20 NOTE — PROGRESS NOTES
Hospitalist Progress Note      PCP: Renetta Ngo, APRN - CNP    Date of Admission: 9/18/2020    Subjective: covid neg, pt cont to c/o feeling nausea    Medications:  Reviewed    Infusion Medications   Scheduled Medications    pantoprazole  40 mg Intravenous Daily    potassium chloride  10 mEq Oral Daily    venlafaxine  75 mg Oral Daily with breakfast    sodium chloride flush  10 mL Intravenous 2 times per day    aspirin  81 mg Oral Daily    atorvastatin  10 mg Oral Nightly    amLODIPine  5 mg Oral Daily    albuterol sulfate HFA  2 puff Inhalation TID    enoxaparin  1 mg/kg Subcutaneous BID     PRN Meds: traZODone, sodium chloride flush, potassium chloride **OR** potassium alternative oral replacement **OR** potassium chloride, acetaminophen **OR** acetaminophen, polyethylene glycol, promethazine **OR** ondansetron, regadenoson, albuterol sulfate HFA      Intake/Output Summary (Last 24 hours) at 9/20/2020 0101  Last data filed at 9/19/2020 2012  Gross per 24 hour   Intake 200 ml   Output 250 ml   Net -50 ml       Physical Exam Performed:    /65   Pulse 90   Temp 99 °F (37.2 °C) (Oral)   Resp 20   Ht 5' 4\" (1.626 m)   Wt 290 lb (131.5 kg)   SpO2 90%   BMI 49.78 kg/m²     Gen: Obese male, mild resp distress, alert   Eyes:  No conjunctival injection. ENT: No discharge. Pharynx clear. Neck: Trachea midline. Resp:. + crackles. No wheezes. No rhonchi. CV: Regular rate. Regular rhythm. No murmur. No rub. ++ edema. Capillary Refill: Brisk,< 3 seconds   Peripheral Pulses: +2 palpable, equal bilaterally   GI: Non-tender. Mildly distended. Normal bowel sounds. Skin: Warm and dry. M/S: No cyanosis. No joint deformity. No clubbing. Neuro: Awake. Grossly nonfocal    Psych: Oriented x 3. No anxiety or agitation.     Labs:   Recent Labs     09/18/20 0927 09/19/20 0514   WBC 11.6* 11.4*   HGB 16.4 17.1   HCT 49.3 52.0    187     Recent Labs     09/18/20 0927 09/19/20 0514  133*   K 4.4 4.5    96*   CO2 30 32   BUN 10 12   CREATININE 0.8* 0.9   CALCIUM 9.1 9.3     Recent Labs     09/19/20  0514   AST 20   ALT 26   BILITOT 0.7   ALKPHOS 67     No results for input(s): INR in the last 72 hours. Recent Labs     09/18/20  0927 09/18/20  1712 09/18/20  2330   TROPONINI <0.01 <0.01 <0.01       Urinalysis:      Lab Results   Component Value Date    NITRU Negative 03/09/2020    WBCUA 3-5 03/09/2020    BACTERIA 1+ 03/09/2020    RBCUA 5-10 03/09/2020    BLOODU TRACE-INTACT 03/09/2020    SPECGRAV 1.020 03/09/2020    GLUCOSEU Negative 03/09/2020    GLUCOSEU NEGATIVE 03/16/2011       Radiology:  CT CHEST PULMONARY EMBOLISM W CONTRAST   Final Result   No evidence of pulmonary embolism or aortic dissection. Overall, no acute abnormalities detected         XR CHEST PORTABLE   Final Result   1. No acute abnormality. NM Cardiac Stress Test Nuclear Imaging    (Results Pending)           Assessment/Plan:    Active Hospital Problems    Diagnosis    Chronic diastolic congestive heart failure (HCC) [I50.32]    Hyperlipidemia [E78.5]    Essential hypertension [I10]    Insomnia [G47.00]    Morbid obesity with BMI of 45.0-49.9, adult (HCC) [E66.01, Z68.42]    Chronic obstructive pulmonary disease ([facility] [address] [J44.9]    Chest pain [R07.9]    Dyspnea on exertion [R06.09]         LEE  -Admit to PCU with telemetry  -Continue aspirin and Lipitor  -EKG without any ischemic changes, chronic Q waves in inferior and anterior leads   -Chest x-ray:  WNL   -Trend troponins: Negative x2   -Stress test pending - negative COVID test   -PRN symptom control     SOB  - elevated D-dimer on admission   - Will start full dose AC-->CTPA to rule out PE is negative   - Had recent Echo-->weight stable, no evidence of acute CHF  - Had low dose Chest CT yesterday OP-->resolution of ground glass opacity  - pulm consulted  - COVID-19 neg    Nausea - unclear etio, on antiemetics, started PPI     Chronic dCHF  - recent admission with acute on chronic CHF  - Treated with diuretics, started IV Lasix (patient was not originally agreeable to this plan so will start slow and titrate as tolerated)     HLD  - Continue statin      Elevated BP  - No prior hx of HTN   - Not on home medications   - Started on Norvasc      COPD   - no AE   - recent admission for AE-->tx with doxycycline      OANH  - no BiPAP or CPAP until COVID ruled out     Insomnia   - Continue Trazodone      Depression   Anxiety   - continue home medications      Morbid Obesity  - Body mass index is 48.75 kg/m². - Complicating assessment and treatment.  Placing patient at risk for multiple co-morbidities as well as early death and contributing to the patient's presentation.   - Counseled on weight loss      Diet: DIET CLEAR LIQUID; No Caffeine  Code Status: Full Code    PT/OT Eval Status: ordered    Dispo - cont care, tx to PCU    Cathey Eisenmenger, MD

## 2020-09-20 NOTE — PROGRESS NOTES
Pulmonary Progress Note  CC: chest pressure, tightness     Subjective:  Nausea and emesis    IV line peripheral    EXAM:   /70   Pulse 84   Temp 97.8 °F (36.6 °C) (Oral)   Resp 17   Ht 5' 4\" (1.626 m)   Wt 290 lb 6.4 oz (131.7 kg)   SpO2 92%   BMI 49.85 kg/m²  on 3 L  Constitutional:  mild acute distress   HEENT: no scleral icterus  Neck: No tracheal deviation present. Cardiovascular: Normal heart sounds. Pulmonary/Chest: diffuse wheezes. No rhonchi. No rales. No decreased breath sounds. No accessory muscle usage or stridor. Abdominal: Soft. Tender, not distended  Musculoskeletal: No cyanosis. No clubbing. Skin: Skin is warm and dry.      Scheduled Meds:   methylPREDNISolone  40 mg Intravenous Q12H    ipratropium-albuterol  1 ampule Inhalation Q4H WA    pantoprazole  40 mg Intravenous Daily    potassium chloride  10 mEq Oral Daily    venlafaxine  75 mg Oral Daily with breakfast    sodium chloride flush  10 mL Intravenous 2 times per day    aspirin  81 mg Oral Daily    atorvastatin  10 mg Oral Nightly    amLODIPine  5 mg Oral Daily    enoxaparin  1 mg/kg Subcutaneous BID     Continuous Infusions:    PRN Meds:  traZODone, sodium chloride flush, potassium chloride **OR** potassium alternative oral replacement **OR** potassium chloride, acetaminophen **OR** acetaminophen, polyethylene glycol, promethazine **OR** ondansetron, regadenoson, albuterol sulfate HFA    Labs:  CBC:   Recent Labs     09/18/20 0927 09/19/20  0514   WBC 11.6* 11.4*   HGB 16.4 17.1   HCT 49.3 52.0   MCV 88.9 91.0    187     BMP:   Recent Labs     09/18/20 0927 09/19/20  0514    133*   K 4.4 4.5    96*   CO2 30 32   BUN 10 12   CREATININE 0.8* 0.9       Cultures:      9/19/20 SARS CoV 2 negative    Chest imaging was reviewed by me and showed:   CTPA 9/18/20-  Pulmonary Arteries: Pulmonary arteries are adequately opacified for    evaluation.  No evidence of intraluminal filling defect to suggest pulmonary    embolism.  Main pulmonary artery is normal in caliber.         Mediastinum: Visualized thyroid unremarkable.  No mediastinal or hilar    lymphadenopathy is identified.  Esophagus is unremarkable.         Cardiac chambers unremarkable.  The thoracic aorta is normal in caliber    without evidence of dissection.         Lungs/pleura: No focal infiltrates are identified within the lungs.  Airways    are patent.  No pleural effusion.  No pneumothorax.         Upper Abdomen: Limited images of the upper abdomen are unremarkable.         Soft Tissues/Bones: No acute bone or soft tissue abnormality.              Impression    No evidence of pulmonary embolism or aortic dissection.         Overall, no acute abnormalities detected      ECHO 8/19/2020  EF 55-60%  Moderate LVH with normal diastolic filling pressures  Unable to estimate SPAP    ASSESSMENT:  · Chest pain   · Shortness of breath -he has moderate COPD but this does not likely explain his worsening over the last 1 month. His CTPA is negative.      · Orthopnea  · Chronic diastolic CHF  · Moderately severe COPD, now with more wheezing c/w acute exacerbation   · OANH followed by Dr. Irina Avila  · Morbid obesity  · Nausea with repeated bouts of emesis     PLAN:  · Supplemental oxygen to maintain SaO2 >92%; wean as tolerated    · IV solumedrol  · Agree with cardiac evaluation  · Inhaled bronchodilators   · Consider CT abdomen  · Tobacco cessation has been achieved

## 2020-09-21 ENCOUNTER — APPOINTMENT (OUTPATIENT)
Dept: CT IMAGING | Age: 60
DRG: 303 | End: 2020-09-21
Payer: COMMERCIAL

## 2020-09-21 ENCOUNTER — APPOINTMENT (OUTPATIENT)
Dept: NUCLEAR MEDICINE | Age: 60
DRG: 303 | End: 2020-09-21
Payer: COMMERCIAL

## 2020-09-21 LAB
ANION GAP SERPL CALCULATED.3IONS-SCNC: 10 MMOL/L (ref 3–16)
BASOPHILS ABSOLUTE: 0 K/UL (ref 0–0.2)
BASOPHILS RELATIVE PERCENT: 0.3 %
BUN BLDV-MCNC: 21 MG/DL (ref 7–20)
C DIFF TOXIN/ANTIGEN: NORMAL
CALCIUM SERPL-MCNC: 9.4 MG/DL (ref 8.3–10.6)
CHLORIDE BLD-SCNC: 94 MMOL/L (ref 99–110)
CO2: 28 MMOL/L (ref 21–32)
CREAT SERPL-MCNC: 0.8 MG/DL (ref 0.9–1.3)
EOSINOPHILS ABSOLUTE: 0 K/UL (ref 0–0.6)
EOSINOPHILS RELATIVE PERCENT: 0 %
GFR AFRICAN AMERICAN: >60
GFR NON-AFRICAN AMERICAN: >60
GLUCOSE BLD-MCNC: 125 MG/DL (ref 70–99)
HCT VFR BLD CALC: 47.3 % (ref 40.5–52.5)
HEMOGLOBIN: 15.8 G/DL (ref 13.5–17.5)
LV EF: 56 %
LVEF MODALITY: NORMAL
LYMPHOCYTES ABSOLUTE: 1 K/UL (ref 1–5.1)
LYMPHOCYTES RELATIVE PERCENT: 8.9 %
MCH RBC QN AUTO: 29.8 PG (ref 26–34)
MCHC RBC AUTO-ENTMCNC: 33.5 G/DL (ref 31–36)
MCV RBC AUTO: 89 FL (ref 80–100)
MONOCYTES ABSOLUTE: 0.2 K/UL (ref 0–1.3)
MONOCYTES RELATIVE PERCENT: 1.5 %
NEUTROPHILS ABSOLUTE: 10.2 K/UL (ref 1.7–7.7)
NEUTROPHILS RELATIVE PERCENT: 89.3 %
OCCULT BLOOD SCREENING: NORMAL
PDW BLD-RTO: 14.4 % (ref 12.4–15.4)
PLATELET # BLD: 183 K/UL (ref 135–450)
PMV BLD AUTO: 8.2 FL (ref 5–10.5)
POTASSIUM SERPL-SCNC: 4.7 MMOL/L (ref 3.5–5.1)
RBC # BLD: 5.31 M/UL (ref 4.2–5.9)
SODIUM BLD-SCNC: 132 MMOL/L (ref 136–145)
WBC # BLD: 11.4 K/UL (ref 4–11)
WHITE BLOOD CELLS (WBC), STOOL: ABNORMAL

## 2020-09-21 PROCEDURE — 6370000000 HC RX 637 (ALT 250 FOR IP): Performed by: INTERNAL MEDICINE

## 2020-09-21 PROCEDURE — 85025 COMPLETE CBC W/AUTO DIFF WBC: CPT

## 2020-09-21 PROCEDURE — 6360000004 HC RX CONTRAST MEDICATION: Performed by: INTERNAL MEDICINE

## 2020-09-21 PROCEDURE — 87493 C DIFF AMPLIFIED PROBE: CPT

## 2020-09-21 PROCEDURE — 87336 ENTAMOEB HIST DISPR AG IA: CPT

## 2020-09-21 PROCEDURE — 2580000003 HC RX 258: Performed by: NURSE PRACTITIONER

## 2020-09-21 PROCEDURE — 36415 COLL VENOUS BLD VENIPUNCTURE: CPT

## 2020-09-21 PROCEDURE — 2700000000 HC OXYGEN THERAPY PER DAY

## 2020-09-21 PROCEDURE — 87449 NOS EACH ORGANISM AG IA: CPT

## 2020-09-21 PROCEDURE — 6360000002 HC RX W HCPCS: Performed by: INTERNAL MEDICINE

## 2020-09-21 PROCEDURE — 97535 SELF CARE MNGMENT TRAINING: CPT

## 2020-09-21 PROCEDURE — 87324 CLOSTRIDIUM AG IA: CPT

## 2020-09-21 PROCEDURE — 94640 AIRWAY INHALATION TREATMENT: CPT

## 2020-09-21 PROCEDURE — 6360000002 HC RX W HCPCS: Performed by: NURSE PRACTITIONER

## 2020-09-21 PROCEDURE — 6360000002 HC RX W HCPCS: Performed by: PHYSICIAN ASSISTANT

## 2020-09-21 PROCEDURE — 97161 PT EVAL LOW COMPLEX 20 MIN: CPT

## 2020-09-21 PROCEDURE — 94761 N-INVAS EAR/PLS OXIMETRY MLT: CPT

## 2020-09-21 PROCEDURE — 2060000000 HC ICU INTERMEDIATE R&B

## 2020-09-21 PROCEDURE — 93017 CV STRESS TEST TRACING ONLY: CPT

## 2020-09-21 PROCEDURE — A9502 TC99M TETROFOSMIN: HCPCS | Performed by: NURSE PRACTITIONER

## 2020-09-21 PROCEDURE — 3430000000 HC RX DIAGNOSTIC RADIOPHARMACEUTICAL: Performed by: NURSE PRACTITIONER

## 2020-09-21 PROCEDURE — 6370000000 HC RX 637 (ALT 250 FOR IP): Performed by: NURSE PRACTITIONER

## 2020-09-21 PROCEDURE — 80048 BASIC METABOLIC PNL TOTAL CA: CPT

## 2020-09-21 PROCEDURE — C9113 INJ PANTOPRAZOLE SODIUM, VIA: HCPCS | Performed by: INTERNAL MEDICINE

## 2020-09-21 PROCEDURE — 87506 IADNA-DNA/RNA PROBE TQ 6-11: CPT

## 2020-09-21 PROCEDURE — 74177 CT ABD & PELVIS W/CONTRAST: CPT

## 2020-09-21 PROCEDURE — 99232 SBSQ HOSP IP/OBS MODERATE 35: CPT | Performed by: INTERNAL MEDICINE

## 2020-09-21 PROCEDURE — 97530 THERAPEUTIC ACTIVITIES: CPT

## 2020-09-21 PROCEDURE — 97116 GAIT TRAINING THERAPY: CPT

## 2020-09-21 PROCEDURE — G0328 FECAL BLOOD SCRN IMMUNOASSAY: HCPCS

## 2020-09-21 PROCEDURE — 99233 SBSQ HOSP IP/OBS HIGH 50: CPT | Performed by: INTERNAL MEDICINE

## 2020-09-21 PROCEDURE — 83630 LACTOFERRIN FECAL (QUAL): CPT

## 2020-09-21 PROCEDURE — 78452 HT MUSCLE IMAGE SPECT MULT: CPT

## 2020-09-21 PROCEDURE — 97166 OT EVAL MOD COMPLEX 45 MIN: CPT

## 2020-09-21 PROCEDURE — 87328 CRYPTOSPORIDIUM AG IA: CPT

## 2020-09-21 RX ORDER — IPRATROPIUM BROMIDE AND ALBUTEROL SULFATE 2.5; .5 MG/3ML; MG/3ML
1 SOLUTION RESPIRATORY (INHALATION) 4 TIMES DAILY
Status: DISCONTINUED | OUTPATIENT
Start: 2020-09-21 | End: 2020-09-22 | Stop reason: HOSPADM

## 2020-09-21 RX ORDER — FUROSEMIDE 10 MG/ML
INJECTION INTRAMUSCULAR; INTRAVENOUS
Status: DISPENSED
Start: 2020-09-21 | End: 2020-09-21

## 2020-09-21 RX ORDER — FUROSEMIDE 10 MG/ML
40 INJECTION INTRAMUSCULAR; INTRAVENOUS ONCE
Status: COMPLETED | OUTPATIENT
Start: 2020-09-21 | End: 2020-09-21

## 2020-09-21 RX ADMIN — PANTOPRAZOLE SODIUM 40 MG: 40 INJECTION, POWDER, FOR SOLUTION INTRAVENOUS at 11:29

## 2020-09-21 RX ADMIN — ASPIRIN 81 MG CHEWABLE TABLET 81 MG: 81 TABLET CHEWABLE at 11:38

## 2020-09-21 RX ADMIN — REGADENOSON 0.4 MG: 0.08 INJECTION, SOLUTION INTRAVENOUS at 10:02

## 2020-09-21 RX ADMIN — VENLAFAXINE HYDROCHLORIDE 75 MG: 37.5 CAPSULE, EXTENDED RELEASE ORAL at 11:30

## 2020-09-21 RX ADMIN — TRAZODONE HYDROCHLORIDE 50 MG: 50 TABLET ORAL at 21:51

## 2020-09-21 RX ADMIN — METHYLPREDNISOLONE SODIUM SUCCINATE 40 MG: 40 INJECTION, POWDER, FOR SOLUTION INTRAMUSCULAR; INTRAVENOUS at 11:29

## 2020-09-21 RX ADMIN — ENOXAPARIN SODIUM 135 MG: 150 INJECTION SUBCUTANEOUS at 21:43

## 2020-09-21 RX ADMIN — Medication 10 ML: at 21:47

## 2020-09-21 RX ADMIN — IPRATROPIUM BROMIDE AND ALBUTEROL SULFATE 1 AMPULE: .5; 3 SOLUTION RESPIRATORY (INHALATION) at 15:10

## 2020-09-21 RX ADMIN — TETROFOSMIN 34 MILLICURIE: 1.38 INJECTION, POWDER, LYOPHILIZED, FOR SOLUTION INTRAVENOUS at 10:02

## 2020-09-21 RX ADMIN — IOPAMIDOL 75 ML: 755 INJECTION, SOLUTION INTRAVENOUS at 15:38

## 2020-09-21 RX ADMIN — ATORVASTATIN CALCIUM 10 MG: 10 TABLET, FILM COATED ORAL at 21:46

## 2020-09-21 RX ADMIN — FUROSEMIDE 40 MG: 10 INJECTION, SOLUTION INTRAMUSCULAR; INTRAVENOUS at 11:39

## 2020-09-21 RX ADMIN — IPRATROPIUM BROMIDE AND ALBUTEROL SULFATE 1 AMPULE: .5; 3 SOLUTION RESPIRATORY (INHALATION) at 19:32

## 2020-09-21 RX ADMIN — IPRATROPIUM BROMIDE AND ALBUTEROL SULFATE 1 AMPULE: .5; 3 SOLUTION RESPIRATORY (INHALATION) at 06:49

## 2020-09-21 RX ADMIN — AMLODIPINE BESYLATE 5 MG: 5 TABLET ORAL at 11:30

## 2020-09-21 RX ADMIN — POTASSIUM CHLORIDE 10 MEQ: 750 TABLET, EXTENDED RELEASE ORAL at 11:30

## 2020-09-21 RX ADMIN — Medication 10 ML: at 11:30

## 2020-09-21 NOTE — PROGRESS NOTES
Inpatient Occupational Therapy  Evaluation and Treatment    Unit: PCU  Date:  9/21/2020  Patient Name:    Thien Wall  Admitting diagnosis:  Chest pain [R07.9]  Chest pain [R07.9]  Admit Date:  9/18/2020  Precautions/Restrictions/WB Status/ Lines/ Wounds/ Oxygen: Lines -Supplemental O2 (3 liters) , telemetry     Treatment Time:  6825-0892  Treatment Number: 1   Timed code treatment minutes 35 minutes   Total Treatment minutes:   45   minutes    Patient Goals for Therapy:  \" go home  \"      Discharge Recommendations: Home PRN assist   DME needs for discharge: needs met        Therapy recommendations for staff: Independent with use of No AD for all transfers to/from bathroom    History of Present Illness:   (Per H&P by Geovnany Call on 9/18/20)  64 y. o. male with anxiety, COPD, OANH, history of alcohol and drug abuse, chronic diastolic CHF [place] [facility] [place] complaint of shortness of breath.  Reports that he has been feeling poorly ever since he was discharged 2 weeks ago. [facility] was not discharged home on oxygen but reports that he has oxygen at home. [facility] states that he has had significant dyspnea on exertion since going home and has been using about 2 L of oxygen continuously. [facility] states that any minimal exertion resulted in significant shortness of breath.  He states he can even get up and walk to his bathroom without feeling extremely tired and worn out. Christian Jain reports a productive cough with green sputum that is worsened since he finished the doxycycline for his COPD exacerbation.  He reports no known fevers.  He is not had any associated chest pain with his shortness of breath.  He has been noticing some swelling in the abdomen and legs.  He does take Lasix at home but states that it has been waxing and waning with his swelling.  He reports that he was about 4 pounds higher than his discharge weight when he was weighed at the office visit 2 days ago. Christian Jain reports he did have a CT chest which showed calcified arteries in his heart but otherwise showed that he had improvement in the opacities that were seen prior.  Patient is still smoking but states that he is cut back from 4 packs/day down to about a pack per week.  He denies any sick contacts. [facility] does report that he feels that if something does not change \" I am not to be here much longer\".   Home Health S4 Level Recommendation:  NA  AM-PAC Score: 22    Preadmission Environment    Information taken from PT evaluation note dated 03/14/2020 and updated as per new information received from patient)  Pt. Lives with spouse and 24/7 Assist Available   Home environment:    one story home  Steps to enter first floor:   ramp with bilateral rail  Steps to second floor: N/A  Bathroom:       walk-in shower with built in seat, grab bars; raise toilet seat with grab bars  Equipment owned:      Rolling Walker, pulse ox, home O2 (2L) PRN, lift chair   Pt sleeps on couch usually (has flat bed or lift chair for other sleeping options)     Preadmission Status / PLOF:  History of falls             Yes - \"I fell off my dump truck, that's what hurt my hip\" (fall was in [address] [place] [address]  Pt. Able to drive          Yes  Pt Fully independent with ADL's         Yes  Pt. Required assistance from family for:  cooking, cleaning, laundry              Wife does household chores               Pt uses riding mower, wife does push mower  Pt. 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 Pt agreeable to this OT eval & tx. Subjective  Patient lying supine in bed with family at bedside. Pt agreeable to this OT eval & tx. Upper Extremity ROM:    WFL    Upper Extremity Strength:    WFL    Upper Extremity Sensation    WFL    Upper Extremity Proprioception:  WFL    Coordination and Tone  WFL    Balance  Functional Sitting Balance:  WFL  Functional Standing Balance:WFL    Bed mobility:    Supine to sit: Independent  Sit to supine:   Not Tested  Rolling:    Not Tested  Scooting in sitting:  Independent  Scooting to head of bed:   Not Tested    Bridging:   Not Tested    Transfers:    Sit to stand:  Independent  Stand to sit: Independent  Bed to chair:   Independent  Standard toilet: Independent  Bed to [facility]:  Not Tested    Dressing:      UE:   Not Tested  LE:    Not Tested- pt declined - pt states if he has difficulty his wife helps him    Bathing:    UE:  Not Tested  LE:  Not Tested    Eating:   Independent    Toileting:  Not Tested    Activity Tolerance   Pt completed therapy session with SOB noted with movement   Sitting EOB  SpO2: 93% on RA  HR: 92 bpm   After ambulation  SpO2: 87-90% on RA (improved on 2nd trial)  HR: 100 bpm    Positioning Needs:   Pt up in chair, no alarm needed, positioned in proper neutral alignment and pressure relief provided. Exercise / Activities Initiated:   N/A    Patient/Family Education:   Role of OT  Energy conservation techniques  Instructed in pursed lip breathing   Assessment of Deficits: Pt seen for Occupational therapy evaluation in acute care setting. Pt demonstrated decreased Activity tolerance. Pt functioning below baseline and will likely benefit from skilled occupational therapy services to maximize safety and independence. Goal(s) :   No goals to be made due to the pt does not require OT services    Plan:   Will D/C OT services      Kasi Moore OTR/L [id number]          If patient discharges from this facility prior to next visit, this note will serve as the Discharge Summary     ;

## 2020-09-21 NOTE — PROGRESS NOTES
Inpatient Physical Therapy Evaluation and Treatment    Unit: PCU  Date:  9/21/2020  Patient Name:    Lata Shoemaker  Admitting diagnosis:  Chest pain [R07.9]  Chest pain [R07.9]  Admit Date:  9/18/2020  Precautions/Restrictions/WB Status/ Lines/ Wounds/ Oxygen: Fall risk, Bed/chair alarm, Lines -Supplemental O2 (3L) and Telemetry    Treatment Time:  13:20-14:00  Treatment Number:  1   Timed Code Treatment Minutes: 30 minutes  Total Treatment Minutes:  40  minutes    Patient Goals for Therapy: \" get rid of this \"          Discharge Recommendations: Home PRN assist   DME needs for discharge: Needs Met       Therapy recommendation for EMS Transport: can transport by wheelchair    Therapy recommendations for staff: Independent with use of No AD for all transfers and ambulation within room    History of Present Illness: (Per H&P by Preet Vega on 9/18/20)  64 y.o. male with anxiety, COPD, AONH, history of alcohol and drug abuse, chronic diastolic CHF who presented to [facility] ED with complaint of shortness of breath. Reports that he has been feeling poorly ever since he was discharged 2 weeks ago. He was not discharged home on oxygen but reports that he has oxygen at home. He states that he has had significant dyspnea on exertion since going home and has been using about 2 L of oxygen continuously. He states that any minimal exertion resulted in significant shortness of breath. He states he can even get up and walk to his bathroom without feeling extremely tired and worn out. Patient reports a productive cough with green sputum that is worsened since he finished the doxycycline for his COPD exacerbation. He reports no known fevers. He is not had any associated chest pain with his shortness of breath. He has been noticing some swelling in the abdomen and legs. He does take Lasix at home but states that it has been waxing and waning with his swelling.   He reports that he was about 4 pounds higher than his discharge weight when he was weighed at the office visit 2 days ago. Patient reports he did have a CT chest which showed calcified arteries in his heart but otherwise showed that he had improvement in the opacities that were seen prior. Patient is still smoking but states that he is cut back from 4 packs/day down to about a pack per week. He denies any sick contacts. Patient does report that he feels that if something does not change \" I am not to be here much longer\". Home Health S4 Level Recommendation:  NA  AM-PAC Mobility Score            Preadmission Environment  (Information taken from PT evaluation note dated 03/14/2020 and updated as per new information received from patient)  Pt. Lives with spouse and 24/7 Assist Available   Home environment:    one story home  Steps to enter first floor:   ramp with bilateral rail  Steps to second floor: N/A  Bathroom:       walk-in shower with built in seat, grab bars; raise toilet seat with grab bars  Equipment owned:      Rolling Walker, pulse ox, home O2 (2L) PRN, lift chair   Pt sleeps on couch usually (has flat bed or lift chair for other sleeping options)     Preadmission Status / PLOF:  History of falls             Yes - \"I fell off my dump truck, that's what hurt my hip\" (fall was in [address]  Pt. Able to drive          Yes  Pt Fully independent with ADL's         Yes  Pt. Required assistance from family for:  cooking, cleaning, laundry    Wife does household chores     Pt uses riding mower, wife does push mower  Pt.  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 Pt agreeable to this PT eval & tx. Upper Extremity ROM/Strength  Please see OT evaluation. Lower Extremity ROM / Strength   AROM WFL: Yes    Strength Assessment (measured on a 0-5 scale):  R LE   Quad   5/5   Ant Tib  5/5   Hamstring 5/5   Iliopsoas 4  L LE  Quad   5/5   Ant Tib  5/5   Hamstring 5/5   Iliopsoas 4    Lower Extremity Sensation    WNL    Lower Extremity Proprioception:   WNL    Coordination and Tone  WNL    Balance  Sitting:  Good ; Independent  Comments: for static and dynamic activities, ~15 minutes total    Standing: Good ; Independent  Comments: 3 minutes without AD, wide BEATRIZ    Bed Mobility   Supine to Sit:    Not Tested  Sit to Supine:   Not Tested  Rolling:   Not Tested  Scooting in sitting: Independent  Scooting in supine:  Not Tested    Transfer Training     Sit to stand:   Independent from bed and commode  Stand to sit: Independent to chair, bed and commode  Bed to Chair:   Independent with use of No AD    Gait gait completed as indicated below  Distance:      15 + 50 + 40 ft  Deviations (firm surface/linoleum):  increased BEATRIZ and increased medial-lateral sway  Assistive Device Used:    No AD  Level of Assist:    Independent  Comment: standing rest breaks between bouts, cues for PLB frequently    Stair Training pt does not have stairs in home environment    Activity Tolerance   Pt completed therapy session with SOB noted with conversation, MMTs, and ambulation   Sitting EOB  SpO2: 93% on RA  HR: 92 bpm   After ambulation  SpO2: 87-90% on RA (improved on 2nd trial)  HR: 100 bpm    Positioning Needs   Pt in bed, no alarm needed, positioned in proper neutral alignment and pressure relief provided. Call light provided and all needs within reach    Exercises Initiated  Chema deferred secondary to treatment focus on functional mobility  NA    Other  None.      Patient/Family Education   Pt educated on role of inpatient PT, POC, importance of continued activity, DC

## 2020-09-21 NOTE — PROGRESS NOTES
Pulmonary Progress Note  CC: chest pressure, tightness     Subjective:  Working with therapy    EXAM:   /72   Pulse 79   Temp 97.9 °F (36.6 °C) (Oral)   Resp 16   Ht 5' 4\" (1.626 m)   Wt 288 lb 3.2 oz (130.7 kg)   SpO2 96%   BMI 49.47 kg/m²  on 3 L  Constitutional:  mild acute distress   HEENT: no scleral icterus  Neck: No tracheal deviation present. Cardiovascular: Normal heart sounds. Pulmonary/Chest: diffuse wheezes. No rhonchi. No rales. No decreased breath sounds. No accessory muscle usage or stridor. Abdominal: Soft. Tender, not distended  Musculoskeletal: No cyanosis. No clubbing. Skin: Skin is warm and dry.      Scheduled Meds:   ipratropium-albuterol  1 ampule Inhalation 4x daily    methylPREDNISolone  40 mg Intravenous Q12H    pantoprazole  40 mg Intravenous Daily    potassium chloride  10 mEq Oral Daily    venlafaxine  75 mg Oral Daily with breakfast    sodium chloride flush  10 mL Intravenous 2 times per day    aspirin  81 mg Oral Daily    atorvastatin  10 mg Oral Nightly    amLODIPine  5 mg Oral Daily    enoxaparin  1 mg/kg Subcutaneous BID     Continuous Infusions:    PRN Meds:  traZODone, sodium chloride flush, potassium chloride **OR** potassium alternative oral replacement **OR** potassium chloride, acetaminophen **OR** acetaminophen, polyethylene glycol, promethazine **OR** ondansetron, albuterol sulfate HFA    Labs:  CBC:   Recent Labs     09/19/20  0514 09/21/20  0451   WBC 11.4* 11.4*   HGB 17.1 15.8   HCT 52.0 47.3   MCV 91.0 89.0    183     BMP:   Recent Labs     09/19/20  0514 09/21/20  0451   * 132*   K 4.5 4.7   CL 96* 94*   CO2 32 28   BUN 12 21*   CREATININE 0.9 0.8*       Cultures:      9/19/20 SARS CoV 2 negative    Chest imaging was reviewed by me and showed:   CTPA 9/18/20-  Pulmonary Arteries: Pulmonary arteries are adequately opacified for    evaluation.  No evidence of intraluminal filling defect to suggest pulmonary    embolism. Anne Dodge

## 2020-09-21 NOTE — PROGRESS NOTES
Inpatient Occupational Therapy  Evaluation and Treatment    Unit: {unit location:51413}  Date:  9/21/2020  Patient Name:    Tristin Dockery  Admitting diagnosis:  Chest pain [R07.9]  Chest pain [R07.9]  Admit Date:  9/18/2020  Precautions/Restrictions/WB Status/ Lines/ Wounds/ Oxygen: {precautions:23730}    Treatment Time:  ***  Treatment Number: 1     Billable Treatment Time: *** minutes   Total Treatment Time:   ***   minutes    Patient Goals for Therapy:  \" *** \"      Discharge Recommendations: {Therapy discharge recommendations:05019}  DME needs for discharge: {DME needs for discharge:48657}       Therapy recommendations for staff:   {staff assist:87471} with use of {Assistive Devices:72443} for all {transfers ambulation:75319} {distance options:55811}    History of Present Illness:  64 y.o. male with anxiety, COPD, OANH, history of alcohol and drug abuse, chronic diastolic CHF who presented to [facility] ED with complaint of shortness of breath. Reports that he has been feeling poorly ever since he was discharged 2 weeks ago. Home Health S4 Level Recommendation:  {Home Health S4 Level :66785::\"NA\"}  AM-PAC Score:      Preadmission Environment    Pt. Lives with spouse and 24/7 Assist Available   Home environment:    one story home  Steps to enter first floor:   ramp            Steps to second floor: N/A  Bathroom:       Tub/Shower unit  Equipment owned:      [address] [place] [address]      Preadmission Status / PLOF:  History of falls             Yes - \"I fell off my dump truck, that's what hurt my hip, prolly 3 months ago\"   Pt. Able to drive          Yes  Pt Fully independent with ADL's         Yes  Pt. Required assistance from family for: Independent PTA    Pt.  Fully independent for transfers and gait and walked with: No Device    Pain  {YES/NO:99139}  Rating:{Pain Rating :09033}  Location:***  Pain Medicine Status: {Pain Med Status:88974}      Cognition    A&O {Alert and Oriented:87354}   Able to follow {Cognition:57764}    Subjective  Patient lying supine in bed with no family present - no visitors present due to COVID-19 restrictions  Pt agreeable to this OT eval & tx.      Upper Extremity ROM:    {UE restrictions:70932}    Upper Extremity Strength:    {UE strength assessment:78224}    R UE   Shoulder flexion  {Strength Assessment:98177}     Shoulder extension {Strength Assessment:96358}  Shoulder abduction {Strength Assessment:36973}   Elbow flexion  {Strength Assessment:04520}   Elbow extension {Strength Assessment:46813}   Wrist flexion  {Strength Assessment:73388}   Wrist extension {Strength Assessment:77336}      {WNL Impaired:56468}    L UE  Shoulder flexion  {Strength Assessment:57573}     Shoulder extension {Strength Assessment:86493}  Shoulder abduction {Strength Assessment:36492}   Elbow flexion  {Strength Assessment:83645}   Elbow extension {Strength Assessment:52437}   Wrist flexion  {Strength Assessment:02511}   Wrist extension {Strength Assessment:11981}      {WNL Impaired:88995}    Upper Extremity Sensation    {WNL Impaired:25204}    Upper Extremity Proprioception:  {WNL/WFL/impaired/NT:14316}    Coordination and Tone  {WNL Impaired:37516}    Balance  Functional Sitting Balance:  {WNL Impaired:78261}  Functional Standing Balance:{WNL Impaired:65283}    Bed mobility:    Supine to sit:   {Level of Assist:50064}  Sit to supine:   {Level of Assist:24141}  Rolling:    {Level of Assist:08430}  Scooting in sitting:  {Level of Assist:46191}  Scooting to head of bed:   {Level of Assist:35988}    Bridging:   {Level of Assist:24447}    Transfers:    Sit to stand:  {Level of Assist:91798}  Stand to sit:  {Level of Assist:44423}  Bed to chair:   {Level of Assist:30823}  Standard toilet: {Level of Assist:85241}  Bed to BSC:  {Level of Assist:62359}    Dressing:      UE:   {Level of Assist:23025}  LE:    {Level of Assist:23636}    Bathing:    UE:  {Level of Assist:80274}  LE:  {Level of Assist:69461}    Eating:   {Level of Assist:57462}    Toileting:  {Level of Assist:35734}    Activity Tolerance   Pt completed therapy session with {Activity Tolerance PT List:48690}  SpO2: ***  HR: ***  BP: ***    Positioning Needs:   {Positioning Needs :35955}    Exercise / Activities Initiated:   {UE exercises:52079}    Patient/Family Education:   {OT education:02871}    Assessment of Deficits: Pt seen for Occupational therapy evaluation in acute care setting. Pt demonstrated decreased {OT assessment:38269}. Pt functioning below baseline and will likely benefit from skilled occupational therapy services to maximize safety and independence. Goal(s) : To be met in 3 Visits:  1). Bed to toilet/BSC: {Level of Assist:25035}    To be met in 5 Visits:  1). Supine to/from Sit:  {Level of Assist:34505}  2). Upper Body Bathing:   {Level of Assist:49164}  3). Lower Body Bathing:   {Level of Assist:29991}  4). Upper Body Dressing:  {Level of Assist:98339}  5). Lower Body Dressing:  {Level of Assist:17238}  6). Pt to demonstrate UE exs x 15 reps with minimal cues    Rehabilitation Potential:  {Good Fair Poor:69922} for goals listed above. Strengths for achieving goals include: {Strengths for Achieving Goals:36198}  Barriers to achieving goals include:  {IP barriers:14709}     Plan: To be seen {IP plan of care/frequency :16994} while in acute care setting for therapeutic exercises, bed mobility, transfers, dressing, bathing, family/patient education, ADL/IADL retraining, energy conservation training.      ***          If patient discharges from this facility prior to next visit, this note will serve as the Discharge Summary

## 2020-09-21 NOTE — PROGRESS NOTES
Pt had Lexiscan Myoview stress test, pt abe fair, pt had symptoms of Lexiscan that resolved itself, pt waiting to be scanned, resp easy/even. Pt in good condition.

## 2020-09-21 NOTE — CONSULTS
Gastroenterology Consult Note    Patient:   Marshal Wheeler   :    1960  Facility:   [facility]  Referring/PCP: ANICETO Lay - CNP  Date:     2020  Consultant:   Bobby Aburto PA-C      Chief Complaint   Patient presents with    Shortness of Breath     pt states increased sob since being discharged from hospital a few weeks ago. History of Present illness   64year old male with a history of anxiety, AoV calcification, COPD, ELSA, lumbar DDD, CAD, and ED presented to the ED with SOB. He was admitted with cough, chest pain, and dyspnea for chest pain rule out. He developed diarrhea, nausea, and vomiting two days ago. He admits to chronic nausea controlled with antiemetics qAM. He passed six episodes of diarrhea two evenings ago. He did not eat yesterday due to early satiety. He admits to bloating, heartburn, and epigastric abdominal pain. He denies dysphagia, cramping, rectal bleeding, and melena. He admits to smoking one pack of cigarettes over four weeks, Tylenol supplementation of three tablets daily, and Ibuprofen use of one tablet daily. He denies alcohol, marijuana, and illicit substance use. He has never had an EGD or colonoscopy.     Past Medical History:   Diagnosis Date    Anxiety 2020    Aortic valve calcification 2020    seen on lung CT    Chronic obstructive pulmonary disease ([facility] [address] 9/3/2019    PFT done 19    Coronary artery calcification 2020    seen on lung ct    Crush injury of right foot 2015    DDD (degenerative disc disease), lumbar 2020    Erectile dysfunction 2020    Fatigue 2020    History of alcohol abuse 2020    History of drug use     HNP (herniated nucleus pulposus), lumbar 2020    Hyperglycemia 2020    Hypersomnia 2020    Kidney stone     Lumbar radiculopathy 2020    Lumbar spine pain 2020    LVH (left ventricular hypertrophy)     seen on echo 2020, moderate    Observed sleep apnea 1/6/2020    Reactive depression 1/6/2020    Spondylosis without myelopathy or radiculopathy, lumbar region 1/6/2020    Tobacco use 1/6/2020     Past Surgical History:   Procedure Laterality Date    CHOLECYSTECTOMY      CYSTOSCOPY  03/16/2011    cystoscopy left ureteroscopy, left retrograde, double J stent placement    PAIN MANAGEMENT PROCEDURE Right 12/24/2019    RIGHT LUMBAR FIVE SACRAL ONE TRANSFORAMINAL EPIDURAL STEROID INJECTION SITE CONFIRMED BY FLUOROSCOPY performed by Santos Lerner MD at [address] Right 1/7/2020    RIGHT LUMBAR FOUR AND LUMBAR FIVE TRANSFORAMINAL EPIDURAL STEROID INJECTION SITE CONFIRMED BY FLUOROSCOPY [facility]       Social:   Social History     Tobacco Use    Smoking status: Former Smoker     Packs/day: 2.00     Years: 35.00     Pack years: 70.00     Start date: 1974    Smokeless tobacco: Never Used   Substance Use Topics    Alcohol use: No     Family:   Family History   Problem Relation Age of Onset    Heart Disease Mother     Other Mother         copd    Liver Disease Father     High Blood Pressure Sister     No Known Problems Sister      No current facility-administered medications on file prior to encounter.       Current Outpatient Medications on File Prior to Encounter   Medication Sig Dispense Refill    atorvastatin (LIPITOR) 10 MG tablet Take 1 tablet by mouth daily 30 tablet 3    furosemide (LASIX) 20 MG tablet TAKE ONE TABLET BY MOUTH EVERY MORNING 30 tablet 0    venlafaxine (EFFEXOR XR) 75 MG extended release capsule TAKE ONE CAPSULE BY MOUTH DAILY 30 capsule 0    traZODone (DESYREL) 50 MG tablet TAKE ONE TABLET BY MOUTH ONCE NIGHTLY AS NEEDED FOR SLEEP 30 tablet 0    potassium chloride (KLOR-CON) 10 MEQ extended release tablet TAKE ONE TABLET BY MOUTH DAILY 30 tablet 0    albuterol (PROVENTIL) (2.5 MG/3ML) 0.083% nebulizer solution Take 3 mLs by nebulization every 6 hours as needed for Wheezing or Shortness of Breath 25 vial 1    ipratropium (ATROVENT) 0.02 % nebulizer solution Take 2.5 mLs by nebulization 4 times daily as needed for Wheezing (short of breath) 2.5 mL 1    albuterol sulfate HFA (PROVENTIL HFA) 108 (90 Base) MCG/ACT inhaler Inhale 2 puffs into the lungs every 6 hours as needed for Wheezing 1 Inhaler 0      Infusions:   PRN Medications: traZODone, sodium chloride flush, potassium chloride **OR** potassium alternative oral replacement **OR** potassium chloride, acetaminophen **OR** acetaminophen, polyethylene glycol, promethazine **OR** ondansetron, albuterol sulfate HFA  Allergies: Allergies   Allergen Reactions    Codeine Itching    Dilaudid [Hydromorphone Hcl]        ROS:   Constitutional: negative for chills, fevers and sweats  Eyes: negative for cataracts, icterus and redness  Ears, nose, mouth, throat, and face: negative for epistaxis, hearing loss and sore throat  Respiratory: negative for cough, hemoptysis and sputum  Cardiovascular: negative for chest pain, dyspnea and lower extremity edema  Gastrointestinal: as per HPI  Genitourinary:negative for dysuria, frequency and hematuria  Neurological: negative for coordination problems, dizziness and gait problems  Behavioral/Psych: negative for anxiety, depression and mood swings    Physical Exam   /79   Pulse 77   Temp 97.9 °F (36.6 °C) (Oral)   Resp 18   Ht 5' 4\" (1.626 m)   Wt 288 lb 3.2 oz (130.7 kg)   SpO2 94%   BMI 49.47 kg/m²     General appearance: alert, appears stated age and cooperative  Head: Normocephalic, without obvious abnormality, atraumatic  Eyes: conjunctivae/corneas clear. PERRL, EOM's intact. Fundi benign.   Neck: no adenopathy and supple, symmetrical, trachea midline  Lungs: clear to auscultation bilaterally  Heart: regular rate and rhythm, S1, S2 normal, no murmur, click, rub or gallop  Abdomen: normal findings: bowel sounds normal, no masses palpable and symmetric and abnormal findings:  distended, obese and tenderness mild in the epigastrium  Extremities: extremities normal, atraumatic, no cyanosis or edema    Lab and Imaging Review   Labs:  CBC:   Recent Labs     09/19/20  0514 09/21/20  0451   WBC 11.4* 11.4*   HGB 17.1 15.8   HCT 52.0 47.3   MCV 91.0 89.0    183     BMP:   Recent Labs     09/19/20  0514 09/21/20  0451   * 132*   K 4.5 4.7   CL 96* 94*   CO2 32 28   BUN 12 21*   CREATININE 0.9 0.8*     LIVER PROFILE:   Recent Labs     09/19/20  0514   AST 20   ALT 26   PROT 6.8   BILITOT 0.7   ALKPHOS 67     PT/INR: No results for input(s): INR in the last 72 hours. Invalid input(s): PT    IMAGING:  XR CHEST PORTABLE - 9/18/2020  Impression    1.  No acute abnormality. CT CHEST PULMONARY EMBOLISM W CONTRAST - 9/18/2020  Impression    No evidence of pulmonary embolism or aortic dissection.         Overall, no acute abnormalities detected      NM Cardiac Stress Test Nuclear Imaging   Status: In process      CT ABDOMEN PELVIS W IV CONTRAST   pending    Attending Supervising [de-identified] Attestation Statement  The patient is a 64 y.o. male. I have performed a history and physical examination of the patient. I discussed the case with my physician assistant Bettina Mojica PA-C    I reviewed the patient's Past Medical History, Past Surgical History, Medications, and Allergies.      Physical Exam:  Vitals:    09/21/20 1000 09/21/20 1115 09/21/20 1512 09/21/20 1530   BP: 108/79 114/72  132/75   Pulse: 77 79  79   Resp: 18 16 17 18   Temp: 97.9 °F (36.6 °C) 97.9 °F (36.6 °C)  97.9 °F (36.6 °C)   TempSrc: Oral Oral  Oral   SpO2: 94% 96% 93% 93%   Weight:       Height:           Physical Examination: General appearance - alert, well appearing, and in no distress  Mental status - alert, oriented to person, place, and time  Eyes - pupils equal and reactive, extraocular eye movements intact  Neck - supple, no significant adenopathy  Chest - clear to auscultation, no wheezes, rales or rhonchi, symmetric air entry  Heart - normal rate, regular rhythm, normal S1, S2, no murmurs, rubs, clicks or gallops  Abdomen - soft, nontender, nondistended, no masses or organomegaly  Extremities - peripheral pulses normal, no pedal edema, no clubbing or cyanosis            Assessment:   64year old male with a history of anxiety, AoV calcification, COPD, ELSA, lumbar DDD, CAD, and ED admitted with chest pain and acute COPD exacerbation d/b nausea, vomiting, and diarrhea. His presentation is concerning for gastroenteritis but cannot exclude H pylor gastritis, celiac disease, erosive gastritis vs infectious diarrhea. Plan:   1. Continue supportive care  2. Monitor and document output  3. Pantoprazole 40 mg qAM  4. Await stool test results  5. Await CT abd/pelvis with contrast  6. Advance diet as tolerated  7. PT/OT  8. Will follow    Luis Miller PA-C  10:52 AM 9/21/2020                      64year old male with a history of COPD, CAD, anxiety, lumbar DDD admitted with acute COPD exacerbation. He also complains of chronic abdominal pan and change in BMs. Agree with CT abd/pelvis. Check stool tests. Start PPI daily. Recommend metamucil and probiotics. Will need outpatient colonoscopy for screening and diagnostic purposes.     Crista Orozco MD          [phone]  [phone]

## 2020-09-21 NOTE — PROGRESS NOTES
RESPIRATORY THERAPY ASSESSMENT    Name:  Josep Alejo  Medical Record Number:  [id number]  Age: 64 y.o. Gender: male  : 1960  Today's Date:  2020  Room:  /0301-01    Assessment     Is the patient being admitted for a COPD or Asthma exacerbation? No   (If yes the patient will be seen every 4 hours for the first 24 hours and then reassessed)    Patient Admission Diagnosis      Allergies  Allergies   Allergen Reactions    Codeine Itching    Dilaudid [Hydromorphone Hcl]        Minimum Predicted Vital Capacity:               Actual Vital Capacity:                    Pulmonary History: COPD,CHF  Home Oxygen Therapy:  2-3 liters/min via nasal cannula  Home Respiratory Therapy: Albuterol PRN, Duoneb 4 times daily   Current Respiratory Therapy:   Duoneb Q4WA,   Treatment Type: HHN  Medications: Albuterol/Ipratropium    Respiratory Severity Index(RSI)   Patients with orders for inhalation medications, oxygen, or any therapeutic treatment modality will be placed on Respiratory Protocol. They will be assessed with the first treatment and at least every 72 hours thereafter. The following severity scale will be used to determine frequency of treatment intervention.     Smoking History: Pulmonary Disease or Smoking History, Greater than 15 pack year = 2    Social History  Social History     Tobacco Use    Smoking status: Former Smoker     Packs/day: 2.00     Years: 35.00     Pack years: 70.00     Start date:     Smokeless tobacco: Never Used   Substance Use Topics    Alcohol use: No    Drug use: Not Currently     Comment: hx of drug use       Recent Surgical History: None = 0  Past Surgical History  Past Surgical History:   Procedure Laterality Date    CHOLECYSTECTOMY      CYSTOSCOPY  2011    cystoscopy left ureteroscopy, left retrograde, double J stent placement    PAIN MANAGEMENT PROCEDURE Right 2019    RIGHT LUMBAR FIVE SACRAL ONE TRANSFORAMINAL EPIDURAL STEROID INJECTION SITE CONFIRMED BY FLUOROSCOPY performed by Agusto Pineda MD at [address] Right 1/7/2020    RIGHT LUMBAR FOUR AND LUMBAR FIVE TRANSFORAMINAL EPIDURAL STEROID INJECTION SITE CONFIRMED BY FLUOROSCOPY performed by Agusto Pineda MD at [facility]       Level of Consciousness: Alert, Oriented, and Cooperative = 0    Level of Activity: Walking with assistance = 1    Respiratory Pattern: Regular Pattern; RR 8-20 = 0    Breath Sounds: Diminshed bilaterally and/or crackles = 2    Sputum   ,  , Sputum How Obtained: Spontaneous cough  Cough: Strong, spontaneous, non-productive = 0    Vital Signs   /72   Pulse 79   Temp 97.9 °F (36.6 °C) (Oral)   Resp 16   Ht 5' 4\" (1.626 m)   Wt 288 lb 3.2 oz (130.7 kg)   SpO2 96%   BMI 49.47 kg/m²   SPO2 (COPD values may differ): 88-89% on room air or greater than 92% on FiO2 28- 35% = 2    Peak Flow (asthma only): not applicable = 0    RSI: 7-8 = BID and Q4HPRN (every four hours as needed) for dyspnea        Plan       Goals: medication delivery, mobilize retained secretions, volume expansion and improve oxygenation    Patient/caregiver was educated on the proper method of use for Respiratory Care Devices:  Yes      Level of patient/caregiver understanding able to:   ? Verbalize understanding   ? Demonstrate understanding       ? Teach back        ? Needs reinforcement       ? No available caregiver               ? Other:     Response to education:  Very Good     Is patient being placed on Home Treatment Regimen? No     Does the patient have everything they need prior to discharge? NA     Comments: chart reviewed and pt assessed     Plan of Care: pt to go on 4x daily regiment    Electronically signed by Sally Jimenez RCP on 9/21/2020 at 11:42 AM    Respiratory Protocol Guidelines     1.  Assessment and treatment by Respiratory Therapy will be initiated for medication and therapeutic interventions upon initiation of aerosolized medication. 2. Physician will be contacted for respiratory rate (RR) greater than 35 breaths per minute. Therapy will be held for heart rate (HR) greater than 140 beats per minute, pending direction from physician. 3. Bronchodilators will be administered via Metered Dose Inhaler (MDI) with spacer when the following criteria are met:  a. Alert and cooperative     b. HR < 140 bpm  c. RR < 30 bpm                d. Can demonstrate a 2-3 second inspiratory hold  4. Bronchodilators will be administered via Hand Held Nebulizer [facility]) to patients when ANY of the following criteria are met  a. Incognizant or uncooperative          b. Patients treated with HHN at Home        c. Unable to demonstrate proper use of MDI with spacer     d. RR > 30 bpm   5. Bronchodilators will be delivered via Metered Dose Inhaler (MDI), HHN, Aerogen to intubated patients on mechanical ventilation. 6. Inhalation medication orders will be delivered and/or substituted as outlined below. Aerosolized Medications Ordering and Administration Guidelines:    1. All Medications will be ordered by a physician, and their frequency and/or modality will be adjusted as defined by the patients Respiratory Severity Index (RSI) score. 2. If the patient does not have documented COPD, consider discontinuing anticholinergics when RSI is less than 9.  3. If the bronchospasm worsens (increased RSI), then the bronchodilator frequency can be increased to a maximum of every 4 hours. If greater than every 4 hours is required, the physician will be contacted. 4. If the bronchospasm improves, the frequency of the bronchodilator can be decreased, based on the patient's RSI, but not less than home treatment regimen frequency. 5. Bronchodilator(s) will be discontinued if patient has a RSI less than 9 and has received no scheduled or as needed treatment for 72  Hrs. Patients Ordered on a Mucolytic Agent:    1.  Must always be administered with a

## 2020-09-21 NOTE — PROGRESS NOTES
Patient is resting showing no s/s of distress. Patient is alert and oriented. Meds were given, see MAR. Patient is denying any needs. Bed is in lowest position and call light is within reach. Will continue to monitor. Shift assessment complete, see flowsheets. One time dose of IV lasix given, patient started on cardiac diet. C. Diff added on too previous stool that was sent.

## 2020-09-21 NOTE — PROGRESS NOTES
Hospitalist Progress Note      PCP: Leandrew Oppenheim, APRN - CNP    Date of Admission: 9/18/2020    Subjective: covid neg. patient will need a 2-day stress test.  First portion done today. He has some chest tightness. He has a history of both heart failure and COPD. Cardiac evaluation in process to evaluate for shortness of breath. His diarrhea is much improved. Medications:  Reviewed    Infusion Medications   Scheduled Medications    ipratropium-albuterol  1 ampule Inhalation 4x daily    furosemide        methylPREDNISolone  40 mg Intravenous Q12H    pantoprazole  40 mg Intravenous Daily    potassium chloride  10 mEq Oral Daily    venlafaxine  75 mg Oral Daily with breakfast    sodium chloride flush  10 mL Intravenous 2 times per day    aspirin  81 mg Oral Daily    atorvastatin  10 mg Oral Nightly    amLODIPine  5 mg Oral Daily    enoxaparin  1 mg/kg Subcutaneous BID     PRN Meds: traZODone, sodium chloride flush, potassium chloride **OR** potassium alternative oral replacement **OR** potassium chloride, acetaminophen **OR** acetaminophen, polyethylene glycol, promethazine **OR** ondansetron, albuterol sulfate HFA      Intake/Output Summary (Last 24 hours) at 9/21/2020 1252  Last data filed at 9/21/2020 1241  Gross per 24 hour   Intake 660 ml   Output 1900 ml   Net -1240 ml       Physical Exam Performed:    /72   Pulse 79   Temp 97.9 °F (36.6 °C) (Oral)   Resp 16   Ht 5' 4\" (1.626 m)   Wt 288 lb 3.2 oz (130.7 kg)   SpO2 96%   BMI 49.47 kg/m²     Gen: Obese male, he is not in any distress  Eyes:  No conjunctival injection. ENT: No discharge. Pharynx clear. Neck: Trachea midline. Resp:. + crackles. No wheezes. No rhonchi. CV: Regular rate. Regular rhythm. No murmur. No rub. ++ edema. Capillary Refill: Brisk,< 3 seconds   Peripheral Pulses: +2 palpable, equal bilaterally   GI: Non-tender. Mildly distended. Normal bowel sounds. Skin: Warm and dry. M/S: No cyanosis. elevated D-dimer on admission   - Will start full dose AC-->CTPA to rule out PE is negative   - Had recent Echo-->weight stable, no evidence of acute CHF  - Had low dose Chest CT yesterday OP-->resolution of ground glass opacity  - pulm consulted  - COVID-19 neg    Nausea - unclear etio, on antiemetics, started PPI. Nausea emesis resolved.     Chronic dCHF  - recent admission with acute on chronic CHF  - Treated with diuretics, started IV Lasix (patient was not originally agreeable to this plan so will start slow and titrate as tolerated). Given additional dose of IV Lasix today.     HLD  - Continue statin      Elevated BP  - No prior hx of HTN   - Not on home medications   - Started on Norvasc      COPD   - no AE   - recent admission for AE-->tx with doxycycline      OANH  - no BiPAP or CPAP until COVID ruled out     Insomnia   - Continue Trazodone      Depression   Anxiety   - continue home medications      Morbid Obesity  - Body mass index is 48.75 kg/m². - Complicating assessment and treatment.  Placing patient at risk for multiple co-morbidities as well as early death and contributing to the patient's presentation.   - Counseled on weight loss      Diet: DIET CARDIAC; No Caffeine  Diet NPO, After Midnight Exceptions are: Ice Chips, Sips with Meds  Code Status: Full Code    PT/OT Eval Status: ordered    Dispo - cont care, tx to PCU    Dav Toussaint 9/21/2020 12:54 PM

## 2020-09-21 NOTE — FLOWSHEET NOTE
09/20/20 2016   Vital Signs   Temp 98.7 °F (37.1 °C)   Temp Source Oral   Pulse 79   Heart Rate Source Monitor   Resp 16   /72   BP Location Right lower arm   BP Upper/Lower Lower   Level of Consciousness 0   MEWS Score 1   Oxygen Therapy   SpO2 96 %   O2 Device Nasal cannula   O2 Flow Rate (L/min) 3 L/min   Pt assessment complete. Pt lying in bed quietly. No s/s of distress noted. Lung sounds diminished. Nightly medications given. Pt to be NPO after midnight for stress test in am.  Pt verbalizes understanding. Denies any further needs at this time. Call light within reach.

## 2020-09-21 NOTE — PROGRESS NOTES
Occupational Therapy and Physical Therapy  Attempted evaluation. Patient leaving floor for stress test. OT/PT will follow up later this date as appropriate.        Rosemarie Russell, OTR/L #[id number]  Jennifer Templeton MS PT, # [id number]

## 2020-09-21 NOTE — PROGRESS NOTES
Spoke with Dr. Trena Cullen. Okay to feed patient and give morning medications. Patient will have the second part of his stress test tomorrow morning.

## 2020-09-21 NOTE — PROGRESS NOTES
Pt is lying in bed with their eyes closed. Respirations are easy and even. Call light within reach bed in lowest position with the wheels locked. Will continue to monitor.  Georgi Owen

## 2020-09-21 NOTE — PLAN OF CARE
Problem: SAFETY  Goal: Free from accidental physical injury  Outcome: Ongoing  Goal: Free from intentional harm  Outcome: Ongoing     Problem: DAILY CARE  Goal: Daily care needs are met  Outcome: Ongoing     Problem: PAIN  Goal: Patient's pain/discomfort is manageable  Outcome: Ongoing     Problem: SKIN INTEGRITY  Goal: Skin integrity is maintained or improved  Outcome: Ongoing     Problem: KNOWLEDGE DEFICIT  Goal: Patient/S.O. demonstrates understanding of disease process, treatment plan, medications, and discharge instructions.   Outcome: Ongoing     Problem: DISCHARGE BARRIERS  Goal: Patient's continuum of care needs are met  Outcome: Ongoing     Problem: Pain:  Goal: Pain level will decrease  Description: Pain level will decrease  Outcome: Ongoing  Goal: Control of acute pain  Description: Control of acute pain  Outcome: Ongoing  Goal: Control of chronic pain  Description: Control of chronic pain  Outcome: Ongoing

## 2020-09-22 ENCOUNTER — HOSPITAL ENCOUNTER (OUTPATIENT)
Dept: CARDIAC CATH/INVASIVE PROCEDURES | Age: 60
Discharge: HOME | End: 2020-09-22
Payer: COMMERCIAL

## 2020-09-22 ENCOUNTER — APPOINTMENT (OUTPATIENT)
Dept: NUCLEAR MEDICINE | Age: 60
DRG: 303 | End: 2020-09-22
Payer: COMMERCIAL

## 2020-09-22 ENCOUNTER — HOSPITAL ENCOUNTER (INPATIENT)
Age: 60
LOS: 7 days | Discharge: HOME | DRG: 234 | End: 2020-09-29
Attending: INTERNAL MEDICINE | Admitting: THORACIC SURGERY (CARDIOTHORACIC VASCULAR SURGERY)
Payer: COMMERCIAL

## 2020-09-22 VITALS
OXYGEN SATURATION: 97 % | WEIGHT: 287.7 LBS | SYSTOLIC BLOOD PRESSURE: 127 MMHG | TEMPERATURE: 98 F | RESPIRATION RATE: 18 BRPM | DIASTOLIC BLOOD PRESSURE: 85 MMHG | HEIGHT: 64 IN | BODY MASS INDEX: 49.12 KG/M2 | HEART RATE: 79 BPM

## 2020-09-22 PROBLEM — I25.10 CAD IN NATIVE ARTERY: Status: ACTIVE | Noted: 2020-09-22

## 2020-09-22 PROBLEM — I20.0 UNSTABLE ANGINA (HCC): Status: ACTIVE | Noted: 2020-09-22

## 2020-09-22 PROBLEM — R94.39 ABNORMAL CARDIOVASCULAR STRESS TEST: Status: ACTIVE | Noted: 2020-09-22

## 2020-09-22 LAB
ANION GAP SERPL CALCULATED.3IONS-SCNC: 6 MMOL/L (ref 3–16)
APTT: 35.7 SEC (ref 24.2–36.2)
BASOPHILS ABSOLUTE: 0 K/UL (ref 0–0.2)
BASOPHILS RELATIVE PERCENT: 0.3 %
BUN BLDV-MCNC: 23 MG/DL (ref 7–20)
C. DIFFICILE TOXIN MOLECULAR: NORMAL
CALCIUM SERPL-MCNC: 9.4 MG/DL (ref 8.3–10.6)
CHLORIDE BLD-SCNC: 96 MMOL/L (ref 99–110)
CO2: 33 MMOL/L (ref 21–32)
CREAT SERPL-MCNC: 0.9 MG/DL (ref 0.9–1.3)
CRYPTOSPORIDIUM ANTIGEN STOOL: NORMAL
E HISTOLYTICA ANTIGEN STOOL: NORMAL
EOSINOPHILS ABSOLUTE: 0 K/UL (ref 0–0.6)
EOSINOPHILS RELATIVE PERCENT: 0 %
GFR AFRICAN AMERICAN: >60
GFR NON-AFRICAN AMERICAN: >60
GIARDIA ANTIGEN STOOL: NORMAL
GLUCOSE BLD-MCNC: 123 MG/DL (ref 70–99)
HCT VFR BLD CALC: 49.8 % (ref 40.5–52.5)
HCT VFR BLD CALC: 50.2 % (ref 40.5–52.5)
HCT VFR BLD CALC: 53.7 % (ref 40.5–52.5)
HEMOGLOBIN: 16.4 G/DL (ref 13.5–17.5)
HEMOGLOBIN: 16.5 G/DL (ref 13.5–17.5)
HEMOGLOBIN: 18.1 G/DL (ref 13.5–17.5)
LV EF: 58 %
LV EF: 58 %
LVEF MODALITY: NORMAL
LVEF MODALITY: NORMAL
LYMPHOCYTES ABSOLUTE: 2.3 K/UL (ref 1–5.1)
LYMPHOCYTES RELATIVE PERCENT: 14.9 %
MCH RBC QN AUTO: 29.5 PG (ref 26–34)
MCH RBC QN AUTO: 29.8 PG (ref 26–34)
MCH RBC QN AUTO: 29.8 PG (ref 26–34)
MCHC RBC AUTO-ENTMCNC: 32.8 G/DL (ref 31–36)
MCHC RBC AUTO-ENTMCNC: 33 G/DL (ref 31–36)
MCHC RBC AUTO-ENTMCNC: 33.6 G/DL (ref 31–36)
MCV RBC AUTO: 88.7 FL (ref 80–100)
MCV RBC AUTO: 89.4 FL (ref 80–100)
MCV RBC AUTO: 90.7 FL (ref 80–100)
MONOCYTES ABSOLUTE: 1 K/UL (ref 0–1.3)
MONOCYTES RELATIVE PERCENT: 6.8 %
NEUTROPHILS ABSOLUTE: 11.9 K/UL (ref 1.7–7.7)
NEUTROPHILS RELATIVE PERCENT: 78 %
PDW BLD-RTO: 15 % (ref 12.4–15.4)
PDW BLD-RTO: 15 % (ref 12.4–15.4)
PDW BLD-RTO: 15.3 % (ref 12.4–15.4)
PLATELET # BLD: 185 K/UL (ref 135–450)
PLATELET # BLD: 193 K/UL (ref 135–450)
PLATELET # BLD: 208 K/UL (ref 135–450)
PMV BLD AUTO: 8.1 FL (ref 5–10.5)
PMV BLD AUTO: 8.2 FL (ref 5–10.5)
PMV BLD AUTO: 8.4 FL (ref 5–10.5)
POTASSIUM SERPL-SCNC: 4 MMOL/L (ref 3.5–5.1)
RBC # BLD: 5.54 M/UL (ref 4.2–5.9)
RBC # BLD: 5.56 M/UL (ref 4.2–5.9)
RBC # BLD: 6.06 M/UL (ref 4.2–5.9)
SODIUM BLD-SCNC: 135 MMOL/L (ref 136–145)
WBC # BLD: 12.7 K/UL (ref 4–11)
WBC # BLD: 14.6 K/UL (ref 4–11)
WBC # BLD: 15.3 K/UL (ref 4–11)

## 2020-09-22 PROCEDURE — 2500000003 HC RX 250 WO HCPCS: Performed by: INTERNAL MEDICINE

## 2020-09-22 PROCEDURE — 99152 MOD SED SAME PHYS/QHP 5/>YRS: CPT | Performed by: INTERNAL MEDICINE

## 2020-09-22 PROCEDURE — 99238 HOSP IP/OBS DSCHRG MGMT 30/<: CPT | Performed by: INTERNAL MEDICINE

## 2020-09-22 PROCEDURE — 36415 COLL VENOUS BLD VENIPUNCTURE: CPT

## 2020-09-22 PROCEDURE — 6370000000 HC RX 637 (ALT 250 FOR IP): Performed by: INTERNAL MEDICINE

## 2020-09-22 PROCEDURE — 85027 COMPLETE CBC AUTOMATED: CPT

## 2020-09-22 PROCEDURE — B2151ZZ FLUOROSCOPY OF LEFT HEART USING LOW OSMOLAR CONTRAST: ICD-10-PCS | Performed by: INTERNAL MEDICINE

## 2020-09-22 PROCEDURE — 6370000000 HC RX 637 (ALT 250 FOR IP)

## 2020-09-22 PROCEDURE — 2709999900 HC NON-CHARGEABLE SUPPLY

## 2020-09-22 PROCEDURE — 93458 L HRT ARTERY/VENTRICLE ANGIO: CPT

## 2020-09-22 PROCEDURE — 2060000000 HC ICU INTERMEDIATE R&B

## 2020-09-22 PROCEDURE — 6360000002 HC RX W HCPCS: Performed by: INTERNAL MEDICINE

## 2020-09-22 PROCEDURE — 6360000004 HC RX CONTRAST MEDICATION

## 2020-09-22 PROCEDURE — C1769 GUIDE WIRE: HCPCS

## 2020-09-22 PROCEDURE — 94761 N-INVAS EAR/PLS OXIMETRY MLT: CPT

## 2020-09-22 PROCEDURE — 85730 THROMBOPLASTIN TIME PARTIAL: CPT

## 2020-09-22 PROCEDURE — 2500000003 HC RX 250 WO HCPCS

## 2020-09-22 PROCEDURE — 2580000003 HC RX 258: Performed by: INTERNAL MEDICINE

## 2020-09-22 PROCEDURE — B2111ZZ FLUOROSCOPY OF MULTIPLE CORONARY ARTERIES USING LOW OSMOLAR CONTRAST: ICD-10-PCS | Performed by: INTERNAL MEDICINE

## 2020-09-22 PROCEDURE — 93458 L HRT ARTERY/VENTRICLE ANGIO: CPT | Performed by: INTERNAL MEDICINE

## 2020-09-22 PROCEDURE — 80048 BASIC METABOLIC PNL TOTAL CA: CPT

## 2020-09-22 PROCEDURE — C9113 INJ PANTOPRAZOLE SODIUM, VIA: HCPCS | Performed by: INTERNAL MEDICINE

## 2020-09-22 PROCEDURE — 99232 SBSQ HOSP IP/OBS MODERATE 35: CPT | Performed by: INTERNAL MEDICINE

## 2020-09-22 PROCEDURE — 85025 COMPLETE CBC W/AUTO DIFF WBC: CPT

## 2020-09-22 PROCEDURE — 2700000000 HC OXYGEN THERAPY PER DAY

## 2020-09-22 PROCEDURE — 4A023N7 MEASUREMENT OF CARDIAC SAMPLING AND PRESSURE, LEFT HEART, PERCUTANEOUS APPROACH: ICD-10-PCS | Performed by: INTERNAL MEDICINE

## 2020-09-22 PROCEDURE — 99255 IP/OBS CONSLTJ NEW/EST HI 80: CPT | Performed by: INTERNAL MEDICINE

## 2020-09-22 PROCEDURE — 94640 AIRWAY INHALATION TREATMENT: CPT

## 2020-09-22 PROCEDURE — A9502 TC99M TETROFOSMIN: HCPCS | Performed by: NURSE PRACTITIONER

## 2020-09-22 PROCEDURE — 2580000003 HC RX 258: Performed by: NURSE PRACTITIONER

## 2020-09-22 PROCEDURE — 3430000000 HC RX DIAGNOSTIC RADIOPHARMACEUTICAL: Performed by: NURSE PRACTITIONER

## 2020-09-22 PROCEDURE — 6360000002 HC RX W HCPCS

## 2020-09-22 PROCEDURE — C1887 CATHETER, GUIDING: HCPCS

## 2020-09-22 PROCEDURE — C1894 INTRO/SHEATH, NON-LASER: HCPCS

## 2020-09-22 RX ORDER — ASPIRIN 81 MG/1
TABLET, CHEWABLE ORAL
Status: COMPLETED | OUTPATIENT
Start: 2020-09-22 | End: 2020-09-22

## 2020-09-22 RX ORDER — HEPARIN SODIUM 1000 [USP'U]/ML
2000 INJECTION, SOLUTION INTRAVENOUS; SUBCUTANEOUS PRN
Status: DISCONTINUED | OUTPATIENT
Start: 2020-09-22 | End: 2020-09-22 | Stop reason: HOSPADM

## 2020-09-22 RX ORDER — BUDESONIDE AND FORMOTEROL FUMARATE DIHYDRATE 160; 4.5 UG/1; UG/1
2 AEROSOL RESPIRATORY (INHALATION) 2 TIMES DAILY
Status: DISCONTINUED | OUTPATIENT
Start: 2020-09-22 | End: 2020-09-29 | Stop reason: HOSPADM

## 2020-09-22 RX ORDER — SODIUM CHLORIDE 9 MG/ML
INJECTION, SOLUTION INTRAVENOUS CONTINUOUS
Status: DISCONTINUED | OUTPATIENT
Start: 2020-09-22 | End: 2020-09-22

## 2020-09-22 RX ORDER — ACETAMINOPHEN 650 MG/1
650 SUPPOSITORY RECTAL EVERY 6 HOURS PRN
Status: DISCONTINUED | OUTPATIENT
Start: 2020-09-22 | End: 2020-09-29 | Stop reason: HOSPADM

## 2020-09-22 RX ORDER — SODIUM CHLORIDE 9 MG/ML
INJECTION, SOLUTION INTRAVENOUS CONTINUOUS
Status: CANCELLED | OUTPATIENT
Start: 2020-09-22

## 2020-09-22 RX ORDER — ATORVASTATIN CALCIUM 80 MG/1
80 TABLET, FILM COATED ORAL NIGHTLY
Status: DISCONTINUED | OUTPATIENT
Start: 2020-09-22 | End: 2020-09-29 | Stop reason: HOSPADM

## 2020-09-22 RX ORDER — ALBUTEROL SULFATE 90 UG/1
2 AEROSOL, METERED RESPIRATORY (INHALATION) EVERY 6 HOURS PRN
Status: DISCONTINUED | OUTPATIENT
Start: 2020-09-22 | End: 2020-09-28

## 2020-09-22 RX ORDER — ACETAMINOPHEN 325 MG/1
650 TABLET ORAL EVERY 6 HOURS PRN
Status: DISCONTINUED | OUTPATIENT
Start: 2020-09-22 | End: 2020-09-29 | Stop reason: HOSPADM

## 2020-09-22 RX ORDER — MIDAZOLAM HYDROCHLORIDE 5 MG/ML
INJECTION INTRAMUSCULAR; INTRAVENOUS
Status: COMPLETED | OUTPATIENT
Start: 2020-09-22 | End: 2020-09-22

## 2020-09-22 RX ORDER — ONDANSETRON 2 MG/ML
4 INJECTION INTRAMUSCULAR; INTRAVENOUS EVERY 6 HOURS PRN
Status: CANCELLED | OUTPATIENT
Start: 2020-09-22

## 2020-09-22 RX ORDER — POTASSIUM CHLORIDE 750 MG/1
10 TABLET, EXTENDED RELEASE ORAL DAILY
Status: DISCONTINUED | OUTPATIENT
Start: 2020-09-23 | End: 2020-09-25

## 2020-09-22 RX ORDER — ATORVASTATIN CALCIUM 40 MG/1
80 TABLET, FILM COATED ORAL NIGHTLY
Status: DISCONTINUED | OUTPATIENT
Start: 2020-09-22 | End: 2020-09-22 | Stop reason: HOSPADM

## 2020-09-22 RX ORDER — ACETAMINOPHEN 325 MG/1
650 TABLET ORAL EVERY 6 HOURS PRN
Status: CANCELLED | OUTPATIENT
Start: 2020-09-22

## 2020-09-22 RX ORDER — HEPARIN SODIUM 10000 [USP'U]/100ML
1000 INJECTION, SOLUTION INTRAVENOUS CONTINUOUS
Status: DISCONTINUED | OUTPATIENT
Start: 2020-09-22 | End: 2020-09-22 | Stop reason: HOSPADM

## 2020-09-22 RX ORDER — FENTANYL CITRATE 50 UG/ML
INJECTION, SOLUTION INTRAMUSCULAR; INTRAVENOUS
Status: COMPLETED | OUTPATIENT
Start: 2020-09-22 | End: 2020-09-22

## 2020-09-22 RX ORDER — SODIUM CHLORIDE 0.9 % (FLUSH) 0.9 %
10 SYRINGE (ML) INJECTION PRN
Status: DISCONTINUED | OUTPATIENT
Start: 2020-09-22 | End: 2020-09-25

## 2020-09-22 RX ORDER — VENLAFAXINE HYDROCHLORIDE 37.5 MG/1
75 CAPSULE, EXTENDED RELEASE ORAL
Status: DISCONTINUED | OUTPATIENT
Start: 2020-09-23 | End: 2020-09-29 | Stop reason: HOSPADM

## 2020-09-22 RX ORDER — HEPARIN SODIUM 1000 [USP'U]/ML
4000 INJECTION, SOLUTION INTRAVENOUS; SUBCUTANEOUS ONCE
Status: COMPLETED | OUTPATIENT
Start: 2020-09-22 | End: 2020-09-22

## 2020-09-22 RX ORDER — IPRATROPIUM BROMIDE AND ALBUTEROL SULFATE 2.5; .5 MG/3ML; MG/3ML
1 SOLUTION RESPIRATORY (INHALATION) EVERY 4 HOURS PRN
Status: DISCONTINUED | OUTPATIENT
Start: 2020-09-22 | End: 2020-09-29 | Stop reason: HOSPADM

## 2020-09-22 RX ORDER — ASPIRIN 81 MG/1
81 TABLET, CHEWABLE ORAL DAILY
Status: DISCONTINUED | OUTPATIENT
Start: 2020-09-23 | End: 2020-09-25

## 2020-09-22 RX ORDER — IPRATROPIUM BROMIDE AND ALBUTEROL SULFATE 2.5; .5 MG/3ML; MG/3ML
1 SOLUTION RESPIRATORY (INHALATION) 4 TIMES DAILY
Status: CANCELLED | OUTPATIENT
Start: 2020-09-22

## 2020-09-22 RX ORDER — AMLODIPINE BESYLATE 5 MG/1
5 TABLET ORAL DAILY
Status: DISCONTINUED | OUTPATIENT
Start: 2020-09-23 | End: 2020-09-25

## 2020-09-22 RX ORDER — NITROGLYCERIN 20 MG/100ML
5 INJECTION INTRAVENOUS CONTINUOUS
Status: DISCONTINUED | OUTPATIENT
Start: 2020-09-22 | End: 2020-09-22 | Stop reason: HOSPADM

## 2020-09-22 RX ORDER — POTASSIUM CHLORIDE 750 MG/1
10 TABLET, EXTENDED RELEASE ORAL DAILY
Status: CANCELLED | OUTPATIENT
Start: 2020-09-23

## 2020-09-22 RX ORDER — ASPIRIN 81 MG/1
81 TABLET, CHEWABLE ORAL DAILY
Status: CANCELLED | OUTPATIENT
Start: 2020-09-23

## 2020-09-22 RX ORDER — PROMETHAZINE HYDROCHLORIDE 25 MG/1
12.5 TABLET ORAL EVERY 6 HOURS PRN
Status: CANCELLED | OUTPATIENT
Start: 2020-09-22

## 2020-09-22 RX ORDER — POTASSIUM CHLORIDE 7.45 MG/ML
10 INJECTION INTRAVENOUS PRN
Status: CANCELLED | OUTPATIENT
Start: 2020-09-22

## 2020-09-22 RX ORDER — POTASSIUM CHLORIDE 7.45 MG/ML
10 INJECTION INTRAVENOUS PRN
Status: DISCONTINUED | OUTPATIENT
Start: 2020-09-22 | End: 2020-09-25

## 2020-09-22 RX ORDER — SODIUM CHLORIDE 0.9 % (FLUSH) 0.9 %
10 SYRINGE (ML) INJECTION PRN
Status: CANCELLED | OUTPATIENT
Start: 2020-09-22

## 2020-09-22 RX ORDER — AMLODIPINE BESYLATE 5 MG/1
5 TABLET ORAL DAILY
Status: CANCELLED | OUTPATIENT
Start: 2020-09-23

## 2020-09-22 RX ORDER — IPRATROPIUM BROMIDE AND ALBUTEROL SULFATE 2.5; .5 MG/3ML; MG/3ML
1 SOLUTION RESPIRATORY (INHALATION) 4 TIMES DAILY
Status: DISCONTINUED | OUTPATIENT
Start: 2020-09-22 | End: 2020-09-22

## 2020-09-22 RX ORDER — HEPARIN SODIUM 1000 [USP'U]/ML
2000 INJECTION, SOLUTION INTRAVENOUS; SUBCUTANEOUS PRN
Status: DISCONTINUED | OUTPATIENT
Start: 2020-09-22 | End: 2020-09-25

## 2020-09-22 RX ORDER — SODIUM CHLORIDE 9 MG/ML
INJECTION, SOLUTION INTRAVENOUS CONTINUOUS
Status: ACTIVE | OUTPATIENT
Start: 2020-09-22 | End: 2020-09-22

## 2020-09-22 RX ORDER — ATORVASTATIN CALCIUM 40 MG/1
80 TABLET, FILM COATED ORAL NIGHTLY
Status: CANCELLED | OUTPATIENT
Start: 2020-09-22

## 2020-09-22 RX ORDER — SODIUM CHLORIDE 0.9 % (FLUSH) 0.9 %
10 SYRINGE (ML) INJECTION EVERY 12 HOURS SCHEDULED
Status: CANCELLED | OUTPATIENT
Start: 2020-09-22

## 2020-09-22 RX ORDER — POTASSIUM CHLORIDE 20 MEQ/1
40 TABLET, EXTENDED RELEASE ORAL PRN
Status: CANCELLED | OUTPATIENT
Start: 2020-09-22

## 2020-09-22 RX ORDER — SODIUM CHLORIDE 0.9 % (FLUSH) 0.9 %
10 SYRINGE (ML) INJECTION EVERY 12 HOURS SCHEDULED
Status: DISCONTINUED | OUTPATIENT
Start: 2020-09-23 | End: 2020-09-25

## 2020-09-22 RX ORDER — ASPIRIN 81 MG/1
81 TABLET, CHEWABLE ORAL DAILY
Status: DISCONTINUED | OUTPATIENT
Start: 2020-09-22 | End: 2020-09-22 | Stop reason: HOSPADM

## 2020-09-22 RX ORDER — ACETAMINOPHEN 650 MG/1
650 SUPPOSITORY RECTAL EVERY 6 HOURS PRN
Status: CANCELLED | OUTPATIENT
Start: 2020-09-22

## 2020-09-22 RX ORDER — HEPARIN SODIUM 1000 [USP'U]/ML
4000 INJECTION, SOLUTION INTRAVENOUS; SUBCUTANEOUS PRN
Status: DISCONTINUED | OUTPATIENT
Start: 2020-09-22 | End: 2020-09-25

## 2020-09-22 RX ORDER — HEPARIN SODIUM 10000 [USP'U]/100ML
15.4 INJECTION, SOLUTION INTRAVENOUS CONTINUOUS
Status: DISCONTINUED | OUTPATIENT
Start: 2020-09-22 | End: 2020-09-25

## 2020-09-22 RX ORDER — HEPARIN SODIUM 1000 [USP'U]/ML
4000 INJECTION, SOLUTION INTRAVENOUS; SUBCUTANEOUS PRN
Status: DISCONTINUED | OUTPATIENT
Start: 2020-09-22 | End: 2020-09-22 | Stop reason: HOSPADM

## 2020-09-22 RX ORDER — HYDRALAZINE HYDROCHLORIDE 20 MG/ML
10 INJECTION INTRAMUSCULAR; INTRAVENOUS EVERY 10 MIN PRN
Status: DISCONTINUED | OUTPATIENT
Start: 2020-09-22 | End: 2020-09-25

## 2020-09-22 RX ORDER — VENLAFAXINE HYDROCHLORIDE 75 MG/1
75 CAPSULE, EXTENDED RELEASE ORAL
Status: CANCELLED | OUTPATIENT
Start: 2020-09-23

## 2020-09-22 RX ORDER — SODIUM CHLORIDE 0.9 % (FLUSH) 0.9 %
10 SYRINGE (ML) INJECTION EVERY 12 HOURS SCHEDULED
Status: DISCONTINUED | OUTPATIENT
Start: 2020-09-22 | End: 2020-09-25

## 2020-09-22 RX ORDER — PANTOPRAZOLE SODIUM 40 MG/10ML
40 INJECTION, POWDER, LYOPHILIZED, FOR SOLUTION INTRAVENOUS DAILY
Status: DISCONTINUED | OUTPATIENT
Start: 2020-09-23 | End: 2020-09-24

## 2020-09-22 RX ORDER — NITROGLYCERIN 0.4 MG/1
TABLET SUBLINGUAL
Status: DISCONTINUED
Start: 2020-09-22 | End: 2020-09-22 | Stop reason: HOSPADM

## 2020-09-22 RX ORDER — TRAZODONE HYDROCHLORIDE 50 MG/1
50 TABLET ORAL NIGHTLY PRN
Status: CANCELLED | OUTPATIENT
Start: 2020-09-22

## 2020-09-22 RX ORDER — NITROGLYCERIN 0.4 MG/1
0.4 TABLET SUBLINGUAL EVERY 5 MIN PRN
Status: DISCONTINUED | OUTPATIENT
Start: 2020-09-22 | End: 2020-09-22 | Stop reason: HOSPADM

## 2020-09-22 RX ORDER — ALBUTEROL SULFATE 90 UG/1
2 AEROSOL, METERED RESPIRATORY (INHALATION) EVERY 6 HOURS PRN
Status: CANCELLED | OUTPATIENT
Start: 2020-09-22

## 2020-09-22 RX ORDER — NITROGLYCERIN 20 MG/100ML
5 INJECTION INTRAVENOUS CONTINUOUS
Status: DISCONTINUED | OUTPATIENT
Start: 2020-09-22 | End: 2020-09-24

## 2020-09-22 RX ORDER — FUROSEMIDE 20 MG/1
20 TABLET ORAL DAILY
Status: DISCONTINUED | OUTPATIENT
Start: 2020-09-23 | End: 2020-09-25

## 2020-09-22 RX ORDER — PANTOPRAZOLE SODIUM 40 MG/10ML
40 INJECTION, POWDER, LYOPHILIZED, FOR SOLUTION INTRAVENOUS DAILY
Status: CANCELLED | OUTPATIENT
Start: 2020-09-23

## 2020-09-22 RX ORDER — POTASSIUM CHLORIDE 20 MEQ/1
40 TABLET, EXTENDED RELEASE ORAL PRN
Status: DISCONTINUED | OUTPATIENT
Start: 2020-09-22 | End: 2020-09-25

## 2020-09-22 RX ORDER — PROMETHAZINE HYDROCHLORIDE 25 MG/1
12.5 TABLET ORAL EVERY 6 HOURS PRN
Status: DISCONTINUED | OUTPATIENT
Start: 2020-09-22 | End: 2020-09-25

## 2020-09-22 RX ORDER — POLYETHYLENE GLYCOL 3350 17 G/17G
17 POWDER, FOR SOLUTION ORAL DAILY PRN
Status: CANCELLED | OUTPATIENT
Start: 2020-09-22

## 2020-09-22 RX ORDER — TRAZODONE HYDROCHLORIDE 50 MG/1
50 TABLET ORAL NIGHTLY PRN
Status: DISCONTINUED | OUTPATIENT
Start: 2020-09-22 | End: 2020-09-29 | Stop reason: HOSPADM

## 2020-09-22 RX ORDER — POLYETHYLENE GLYCOL 3350 17 G/17G
17 POWDER, FOR SOLUTION ORAL DAILY PRN
Status: DISCONTINUED | OUTPATIENT
Start: 2020-09-22 | End: 2020-09-25

## 2020-09-22 RX ORDER — ONDANSETRON 2 MG/ML
4 INJECTION INTRAMUSCULAR; INTRAVENOUS EVERY 6 HOURS PRN
Status: DISCONTINUED | OUTPATIENT
Start: 2020-09-22 | End: 2020-09-25

## 2020-09-22 RX ADMIN — MIDAZOLAM HYDROCHLORIDE 2 MG: 5 INJECTION INTRAMUSCULAR; INTRAVENOUS at 15:46

## 2020-09-22 RX ADMIN — Medication 10 ML: at 21:14

## 2020-09-22 RX ADMIN — HEPARIN SODIUM AND DEXTROSE 1000 UNITS/HR: 10000; 5 INJECTION INTRAVENOUS at 13:02

## 2020-09-22 RX ADMIN — HEPARIN SODIUM 10 ML/HR: 10000 INJECTION, SOLUTION INTRAVENOUS at 18:36

## 2020-09-22 RX ADMIN — Medication 2 PUFF: at 19:45

## 2020-09-22 RX ADMIN — HEPARIN SODIUM 4000 UNITS: 1000 INJECTION INTRAVENOUS; SUBCUTANEOUS at 18:35

## 2020-09-22 RX ADMIN — MIDAZOLAM HYDROCHLORIDE 1 MG: 5 INJECTION INTRAMUSCULAR; INTRAVENOUS at 15:54

## 2020-09-22 RX ADMIN — IPRATROPIUM BROMIDE AND ALBUTEROL SULFATE 1 AMPULE: .5; 3 SOLUTION RESPIRATORY (INHALATION) at 11:12

## 2020-09-22 RX ADMIN — FENTANYL CITRATE 25 MCG: 50 INJECTION, SOLUTION INTRAMUSCULAR; INTRAVENOUS at 15:54

## 2020-09-22 RX ADMIN — PANTOPRAZOLE SODIUM 40 MG: 40 INJECTION, POWDER, FOR SOLUTION INTRAVENOUS at 13:01

## 2020-09-22 RX ADMIN — METOPROLOL TARTRATE 25 MG: 25 TABLET, FILM COATED ORAL at 13:09

## 2020-09-22 RX ADMIN — TETROFOSMIN 33.1 MILLICURIE: 1.38 INJECTION, POWDER, LYOPHILIZED, FOR SOLUTION INTRAVENOUS at 08:23

## 2020-09-22 RX ADMIN — ATORVASTATIN CALCIUM 80 MG: 80 TABLET, FILM COATED ORAL at 21:14

## 2020-09-22 RX ADMIN — NITROGLYCERIN 10 MCG/MIN: 20 INJECTION INTRAVENOUS at 18:36

## 2020-09-22 RX ADMIN — NITROGLYCERIN 5 MCG/MIN: 20 INJECTION INTRAVENOUS at 15:01

## 2020-09-22 RX ADMIN — ASPIRIN 243 MG: 81 TABLET, CHEWABLE ORAL at 15:46

## 2020-09-22 RX ADMIN — NITROGLYCERIN 0.4 MG: 0.4 TABLET SUBLINGUAL at 15:03

## 2020-09-22 RX ADMIN — FENTANYL CITRATE 25 MCG: 50 INJECTION, SOLUTION INTRAMUSCULAR; INTRAVENOUS at 15:46

## 2020-09-22 RX ADMIN — TRAZODONE HYDROCHLORIDE 50 MG: 50 TABLET ORAL at 21:15

## 2020-09-22 RX ADMIN — AMLODIPINE BESYLATE 5 MG: 5 TABLET ORAL at 13:09

## 2020-09-22 RX ADMIN — ASPIRIN 81 MG CHEWABLE TABLET 81 MG: 81 TABLET CHEWABLE at 13:08

## 2020-09-22 RX ADMIN — HEPARIN SODIUM 4000 UNITS: 1000 INJECTION, SOLUTION INTRAVENOUS; SUBCUTANEOUS at 13:01

## 2020-09-22 RX ADMIN — METOPROLOL TARTRATE 25 MG: 25 TABLET, FILM COATED ORAL at 21:14

## 2020-09-22 RX ADMIN — Medication 10 ML: at 13:11

## 2020-09-22 RX ADMIN — MIDAZOLAM HYDROCHLORIDE 1 MG: 5 INJECTION INTRAMUSCULAR; INTRAVENOUS at 15:49

## 2020-09-22 ASSESSMENT — PAIN SCALES - GENERAL
PAINLEVEL_OUTOF10: 0

## 2020-09-22 NOTE — PRE SEDATION
Brief Pre-Op Note/Sedation Assessment      Katey Garcia  1960  Cath Pool Rm/NONE      [id number]  3:44 PM    Planned Procedure: Cardiac Catheterization Procedure    Post Procedure Plan: Return to same level of care    Consent: I have discussed with the patient and/or the patient representative the indication, alternatives, and the possible risks and/or complications of the planned procedure and the anesthesia methods. The patient and/or patient representative appear to understand and agree to proceed. Chief Complaint: Chest Pain/Pressure      Indications for Cath Procedure:  Suspected CAD  Anginal Classification within 2 weeks:  CCS IV - Inability to perform any activity without angina or angina at rest, i.e., severe limitation  NYHA Heart Failure Class within 2 weeks: Class III - Symptoms of HF on less-than-ordinary exertion, Newly Diagnosed? No, Heart Failure Type: Diastolic  Is Cath Lab Visit Valve-related?: No  Surgical Risk: N/A  Functional Type: < 4 METS    Anti- Anginal Meds within 2 weeks:   Yes: Aspirin    Stress or Imaging Studies Performed (within 6 months):  Stress Test with SPECT Result: Positive:  anterior and lateral Risk/Extent of Ischemia:  High     Vital Signs: There were no vitals taken for this visit. Allergies:   Allergies   Allergen Reactions    Codeine Itching    Dilaudid [Hydromorphone Hcl]        Past Medical History:  Past Medical History:   Diagnosis Date    Anxiety 1/6/2020    Aortic valve calcification 09/2020    seen on lung CT    Chronic obstructive pulmonary disease ([address] 9/3/2019    PFT done 9/03/19    Coronary artery calcification 09/2020    seen on lung ct    Crush injury of right foot 7/23/2015    DDD (degenerative disc disease), lumbar 1/6/2020    Erectile dysfunction 1/6/2020    Fatigue 1/6/2020    History of alcohol abuse 1/6/2020    History of drug use     HNP (herniated nucleus pulposus), lumbar 1/6/2020    Hyperglycemia 1/6/2020    Hypersomnia 1/6/2020    Kidney stone     Lumbar radiculopathy 1/6/2020    Lumbar spine pain 1/6/2020    LVH (left ventricular hypertrophy)     seen on echo 8/2020, moderate    Observed sleep apnea 1/6/2020    Reactive depression 1/6/2020    Spondylosis without myelopathy or radiculopathy, lumbar region 1/6/2020    Tobacco use 1/6/2020         Surgical History:  Past Surgical History:   Procedure Laterality Date    CHOLECYSTECTOMY      CYSTOSCOPY  03/16/2011    cystoscopy left ureteroscopy, left retrograde, double J stent placement    PAIN MANAGEMENT PROCEDURE Right 12/24/2019    RIGHT LUMBAR FIVE SACRAL ONE TRANSFORAMINAL EPIDURAL STEROID INJECTION SITE CONFIRMED BY FLUOROSCOPY performed by Demar Shukla MD at [address] Right 1/7/2020    RIGHT LUMBAR FOUR AND LUMBAR FIVE TRANSFORAMINAL EPIDURAL STEROID INJECTION SITE CONFIRMED BY FLUOROSCOPY [facility]         Medications:  No current facility-administered medications for this encounter. Pre-Sedation:    Pre-Sedation Documentation and Exam:  I have assessed the patient and agree with the H&P present on the chart. Prior History of Anesthesia Complications:   none    Modified Mallampati:  III (soft palate, uvula, fauces non-visible)    ASA Classification:  Class 4 - A patient with an incapacitating systemic disease that is a constant threat to life      Adele Scale: Activity:  2 - Able to move 4 extremities voluntarily on command  Respiration:  2 - Able to breathe deeply and cough freely  Circulation:  2 - BP+/- 20mmHg of normal  Consciousness:  2 - Fully awake  Oxygen Saturation (color):  2 - Able to maintain oxygen saturation >92% on room air    Sedation/Anesthesia Plan:  Guard the patient's safety and welfare. Minimize physical discomfort and pain.   Minimize negative psychological responses to treatment by providing sedation and analgesia and maximize the potential amnesia. Patient to meet pre-procedure discharge plan.     Medication Planned:  midazolam intravenously and fentanyl intravenously    Patient is an appropriate candidate for plan of sedation: yes      Electronically signed by Ankita Walsh MD on 9/22/2020 at 3:44 PM

## 2020-09-22 NOTE — PROGRESS NOTES
PROGRESS NOTE  S:60 yrs Patient  admitted on 9/18/2020 with Chest pain [R07.9]  Chest pain [R07.9] . Today she complains of bloating, diarrhea, epigastric abdominal pain, and nausea. Exam:   Vitals:    09/22/20 1015   BP: 133/87   Pulse: 76   Resp: 18   Temp: 97.9 °F (36.6 °C)   SpO2: 97%      General appearance: alert, appears stated age, cooperative and no distress  HEENT: Oropharynx clear, no lesions  Neck: no adenopathy and supple, symmetrical, trachea midline  Lungs: clear to auscultation bilaterally  Heart: regular rate and rhythm, S1, S2 normal, no murmur, click, rub or gallop  Abdomen: normal findings: bowel sounds normal, no masses palpable and symmetric and abnormal findings:  distended, obese and tenderness mild in the epigastrium  Extremities: extremities normal, atraumatic, no cyanosis or edema     Medications: Reviewed    Labs:  CBC:   Recent Labs     09/21/20  0451 09/22/20  0537   WBC 11.4* 15.3*   HGB 15.8 16.4   HCT 47.3 49.8   MCV 89.0 89.4    193     BMP:   Recent Labs     09/21/20  0451 09/22/20  0537   * 135*   K 4.7 4.0   CL 94* 96*   CO2 28 33*   BUN 21* 23*   CREATININE 0.8* 0.9     LIVER PROFILE: No results for input(s): AST, ALT, LIPASE, PROT, BILIDIR, BILITOT, ALKPHOS in the last 72 hours. Invalid input(s): AMYLASE,  ALB      IMAGING:  CT ABDOMEN PELVIS W IV CONTRAST   Impression    Negative CT examination. No evidence of acute process in the abdomen or    pelvis. NM Cardiac Stress Test Nuclear Imaging   Status: In process    Attending Supervising [de-identified] Attestation Statement  The patient is a 64 y.o. male. I have performed a history and physical examination of the patient. I discussed the case with my physician assistant Holland Delgado PA-C    I reviewed the patient's Past Medical History, Past Surgical History, Medications, and Allergies.      Physical Exam:  Vitals:    09/22/20 0240 09/22/20 1015 09/22/20 1114 09/22/20 1430   BP:  133/87  127/85   Pulse:  76  79   Resp:  18 18 18   Temp:  97.9 °F (36.6 °C)  98 °F (36.7 °C)   TempSrc:  Oral  Oral   SpO2:  97% 97% 97%   Weight: 287 lb 11.2 oz (130.5 kg)      Height:           Physical Examination: General appearance - alert, well appearing, and in no distress  Mental status - alert, oriented to person, place, and time  Eyes - pupils equal and reactive, extraocular eye movements intact  Neck - supple, no significant adenopathy  Chest - clear to auscultation, no wheezes, rales or rhonchi, symmetric air entry  Heart - normal rate, regular rhythm, normal S1, S2, no murmurs, rubs, clicks or gallops  Abdomen - soft, nontender, nondistended, no masses or organomegaly  Extremities - no pedal edema noted        Impression: 64year old male with a history of COPD, CAD, anxiety, lumbar DDD admitted with acute COPD exacerbation. He also complains of chronic abdominal pan and change in BMs. Recommendation:  1. Continue supportive care  2. Await stool test results  3. Continue PPI qAM  4. Recommend bowel regimen with Metamucil 1 tbsp and probiotics daily  5. Continue diet as tolerated  6. Ambulate BID  7. Up in chair BID  8. Will follow  9. Will need outpatient colonoscopy in 1 month upon d/c      Angie Chan PA-C  10:41 AM 9/22/2020                       64year old male with a history of COPD, CAD, anxiety, lumbar DDD admitted with acute COPD exacerbation. He also complains of chronic abdominal pain and change in BMs which are chronic in nature.  Will plan for outpatient workup after acute cardiac issues resolve    Jose Reilly MD          [phone]  [place] Dandy

## 2020-09-22 NOTE — LETTER
9/27/2020       To Whom it May Concern: This letter is in reference to Mr. Pedro Perry and his impending court appearance on September 28, 2020 in regards to a subpeona to testify. Mr. Magdalene Fabian underwent emergent coronary artery bipass grafting on Friday September 25 and will remain hospitalized the remainder of the week. He is unable to physically attend court. Any questions in concern with this patient can be directed to his physician Deretha Bumpers. Marguerite Gill. Thank you for your attention to this matter.              Sincerely,     Lluvia Nelson RN  [facility]   [phone]

## 2020-09-22 NOTE — PROGRESS NOTES
Patient told  Omer Michael, he was having intermittent chest pain. Nitro SL and gtt ordered. Will start now.

## 2020-09-22 NOTE — PROGRESS NOTES
Spoke with Gertrude Kwan from [address] re transport by air care ETA will be 12-15 min will be [address] [id number] 5-28-07-

## 2020-09-22 NOTE — DISCHARGE SUMMARY
Name:  Steve Jackson  Room:  /4259-52  MRN:    [id number]    Discharge Summary      This discharge summary is in conjunction with a complete physical exam done on the day of discharge. Discharging Physician: Dr. Derrick Fonseca: 9/18/2020  Discharge: 9/22/2020      HPI taken from admission H&P:    The patient is a 64 y.o. male with anxiety, COPD, OANH, history of alcohol and drug abuse, chronic diastolic CHF who presented to [facility] ED with complaint of shortness of breath. Reports that he has been feeling poorly ever since he was discharged 2 weeks ago. He was not discharged home on oxygen but reports that he has oxygen at home. He states that he has had significant dyspnea on exertion since going home and has been using about 2 L of oxygen continuously. He states that any minimal exertion resulted in significant shortness of breath. He states he can even get up and walk to his bathroom without feeling extremely tired and worn out. Patient reports a productive cough with green sputum that is worsened since he finished the doxycycline for his COPD exacerbation. He reports no known fevers. He is not had any associated chest pain with his shortness of breath. He has been noticing some swelling in the abdomen and legs. He does take Lasix at home but states that it has been waxing and waning with his swelling. He reports that he was about 4 pounds higher than his discharge weight when he was weighed at the office visit 2 days ago. Patient reports he did have a CT chest which showed calcified arteries in his heart but otherwise showed that he had improvement in the opacities that were seen prior. Patient is still smoking but states that he is cut back from 4 packs/day down to about a pack per week. He denies any sick contacts. Patient does report that he feels that if something does not change \" I am not to be here much longer\".      Diagnoses this Admission and Hospital Course [address] to PCU with telemetry  -Continue aspirin and Lipitor  -EKG without any ischemic changes, chronic Q waves in inferior and anterior leads   -Chest x-ray: WNL   -Trend troponins: Negative x2   -Stress test ordered. Is a 2-day stress test. - negative COVID test -->+ stress test, transfer to [facility] for [facility]  -PRN symptom control     SOB  - elevated D-dimer on admission   - Will start full dose AC-->CTPA to rule out PE is negative   - Had recent Echo-->weight stable, no evidence of acute CHF  - Had low dose Chest CT yesterday OP-->resolution of ground glass opacity  - pulm consulted  - COVID-19 neg     Nausea  - unclear etio, on antiemetics, started PPI. - Nausea/emesis resolved.     Chronic dCHF  - recent admission with acute on chronic CHF  - Treated with diuretics, started IV Lasix (patient was not originally agreeable to this plan so will start slow and titrate as tolerated). - Given additional dose of IV Lasix      HLD  - Continue statin      Elevated BP  - No prior hx of HTN   - Not on home medications   - Started on Norvasc      COPD   - no AE   - recent admission for AE-->tx with doxycycline      OANH  - no BiPAP or CPAP until COVID ruled out     Insomnia   - Continue Trazodone      Depression   Anxiety   - continue home medications      Morbid Obesity  - Body mass index is 48.75 kg/m². - Complicating assessment and treatment. Placing patient at risk for multiple co-morbidities as well as early death and contributing to the patient's presentation.   - Counseled on weight loss    Procedures (Please Review Full Report for Details)  NM Stress    Consults    Cardiology  GI  Pulmonology    Physical Exam at Discharge:    /87   Pulse 76   Temp 97.9 °F (36.6 °C) (Oral)   Resp 18   Ht 5' 4\" (1.626 m)   Wt 287 lb 11.2 oz (130.5 kg)   SpO2 97%   BMI 49.38 kg/m²     Gen: Obese male, he is not in any distress  Eyes:  No conjunctival injection. ENT: No discharge. Pharynx clear.    [place] midline. Resp:. + crackles. No wheezes. No rhonchi. CV: Regular rate. Regular rhythm. No murmur.  No rub. ++ edema. Capillary Refill: Brisk,< 3 seconds   Peripheral Pulses: +2 palpable, equal bilaterally   GI: Non-tender. Mildly distended. Normal bowel sounds. Skin: Warm and dry. M/S: No cyanosis. No joint deformity. No clubbing. Neuro: Awake. Grossly nonfocal    Psych: Oriented x 3. No anxiety or agitation. CBC:   Recent Labs     09/21/20  0451 09/22/20  0537 09/22/20  1253   WBC 11.4* 15.3* 14.6*   HGB 15.8 16.4 18.1*   HCT 47.3 49.8 53.7*   MCV 89.0 89.4 88.7    193 208     BMP:   Recent Labs     09/21/20  0451 09/22/20  0537   * 135*   K 4.7 4.0   CL 94* 96*   CO2 28 33*   BUN 21* 23*   CREATININE 0.8* 0.9     APTT:   Recent Labs     09/22/20  1253   APTT 35.7     CULTURES  C diff: negative   Fecal leukocytes: +   GI Panel: pending   SARS-CoV-2, NAAT  Not Detected      RADIOLOGY  NM Cardiac Stress Test Nuclear Imaging   Final Result      CT ABDOMEN PELVIS W IV CONTRAST Additional Contrast? None   Final Result   Negative CT examination. No evidence of acute process in the abdomen or   pelvis. CT CHEST PULMONARY EMBOLISM W CONTRAST   Final Result   No evidence of pulmonary embolism or aortic dissection. Overall, no acute abnormalities detected         XR CHEST PORTABLE   Final Result   1. No acute abnormality. NM Stress   Summary     Increase left ventricular systolic volume with hypokinesis/decreased     myocardial thickening of the inferolateral segment. Large area of inducible     ischemia of the LAD and left circumflex territories.  Post-stress LVEF normal     at 56%.     Overall findings represent highly abnormal study, high risk scan.               Discharge Medications     Medication List      ASK your doctor about these medications    * albuterol sulfate  (90 Base) MCG/ACT inhaler  Commonly known as:  Proventil HFA  Inhale 2 puffs into the lungs every 6 hours as needed for Wheezing     * albuterol (2.5 MG/3ML) 0.083% nebulizer solution  Commonly known as:  PROVENTIL  Take 3 mLs by nebulization every 6 hours as needed for Wheezing or Shortness of Breath     atorvastatin 10 MG tablet  Commonly known as:  Lipitor  Take 1 tablet by mouth daily     furosemide 20 MG tablet  Commonly known as:  LASIX  TAKE ONE TABLET BY MOUTH EVERY MORNING     ipratropium 0.02 % nebulizer solution  Commonly known as:  ATROVENT  Take 2.5 mLs by nebulization 4 times daily as needed for Wheezing (short of breath)     potassium chloride 10 MEQ extended release tablet  Commonly known as:  KLOR-CON  TAKE ONE TABLET BY MOUTH DAILY     traZODone 50 MG tablet  Commonly known as:  DESYREL  TAKE ONE TABLET BY MOUTH ONCE NIGHTLY AS NEEDED FOR SLEEP     venlafaxine 75 MG extended release capsule  Commonly known as:  EFFEXOR XR  TAKE ONE CAPSULE BY MOUTH DAILY         * This list has 2 medication(s) that are the same as other medications prescribed for you. Read the directions carefully, and ask your doctor or other care provider to review them with you.                 Transferred in stable condition to [address] 9/23/2020 5:48 PM

## 2020-09-22 NOTE — PROCEDURES
[address]       Cardiac Catheterization Lab Report    PATIENT: Dolores Cortez  DATE: 2020  MRN: [id number]  CSN: [id number]  : 1960      Performing Physician: Brooke Rueda MD, ANN, [place], [place]  Primary Care Physician: Shawnee Dumont, APRN - CNP  Admitting/Referring provider: Paty Benson MD     Procedures Performed:   1. Left heart catheterization  2. Selective left and right coronary angiogram  3. Left ventriculography     Procedure Findings:  1. Severe multi vessel coronary artery diease  2. Normal left ventricular function with EF estimated at 55-60%  3.  Normal left heart hemodynamics    Indications:   Patient Active Problem List   Diagnosis    Crush injury of right foot    Shortness of breath    Chest pain    Erectile dysfunction    Hyperglycemia    Anxiety    Depression    Tobacco abuse    History of alcohol abuse    Fatigue    OANH (obstructive sleep apnea)    Hypersomnia    Chronic obstructive pulmonary disease (HCC)    Lumbar radiculopathy    HNP (herniated nucleus pulposus), lumbar    Spondylosis without myelopathy or radiculopathy, lumbar region    DDD (degenerative disc disease), lumbar    Lumbar spine pain    PNA (pneumonia)    Pneumonia    Acute respiratory distress    Class 3 severe obesity with body mass index (BMI) of 45.0 to 49.9 in adult ([address]    Pneumonia, primary atypical    Acute respiratory failure with hypoxia ([address]    Moderate protein-calorie malnutrition (HCC)    Acute respiratory failure (HCC)    Acute on chronic respiratory failure with hypoxemia (HCC)    Acute diastolic congestive heart failure (HCC)    Chronic diastolic congestive heart failure (HCC)    Hyperlipidemia    Essential hypertension    Insomnia    Morbid obesity with BMI of 45.0-49.9, adult ([address]    Unstable angina (HCC)    Abnormal cardiovascular stress test    CAD in native artery       Details:   Dolores Cortez was brought to the cardiac catheterization lab in a fasting state after informed consent was obtained. If the patient was able to provide written consent, it was obtained. The patient's vitals were monitored through out the procedure. The patient was sedated using the appropriate levels of sedation and ASA guidelines. The appropriate access site area was prepped and drapped in a sterile fashion. The area was anesthetized with 2% lidocaine. Using the modified Seldinger technique, an arterial sheath was introduced into the arterial access site using a single anterior wall puncture. The sheath was flushed and prepped in usual fashion. Catheters used during the procedure included 5 Malagasy TIG 4.0 catheter. The catheters were advanced and removed over a .035\" wire, into the appropriate positions. Multiple angiographic views were obtained. An LV gram was obtained. Findings:    1. Left main coronary artery has moderate calcific disease. It gave off the left anterior descending artery and left circumflex. 2. Left anterior descending artery has severe atherosclerotic disease. It was moderate in size. It gave off a large early diagonal branch that has ostial 90%. The LAD covered the entire apex of the left ventricle. 3. Left circumflex has severe atherosclerotic disease. It was moderate in size. There was a moderate sized obtuse marginal branch that is occluded. 4. Right coronary artery has mild atherosclerotic disease. It was large in size and was the dominant artery. ~there is faint collaterals to the LAD    5. Left ventriculogram showed normal LVEF at 55-60%. Wall motion was normal . There was no significant mitral valve or aortic valve disease noted. LVEDP was normal. There was no gradient noted across the aortic valve during pullback of the catheter. Ht 5' 4\" (1.626 m)   Wt 287 lb (130.2 kg)   BMI 49.26 kg/m²     The access site was controlled with manual pressure and/or appropriate closure device.     Moderate Conscious Sedation Details: An independent trained observer pushed medications at my direction. We monitoring the patient's level of consciousness and vital signs/physiologic status throughout the procedure. CPT codes 24921 and 09573    Start time: 1550  Stop time: 1610  ASA class: 4    Sedation totals:  Versad - 4mg  Fentanyl - 50mcg    EBL - minimal <5 cc blood loss    The patient was monitored continuously with the ECG, pulse oximetry, blood pressure, and direct observation. CONCLUSIONS:    1. Severe multi-vessel coronary artery disease    ASSESSMENT/RECOMMENDATIONS:    1.  Referral for CABG      Tracie Zamorano MD, ANN, [place] [address] Cardiology  [place] [address]  [phone] Main central office  [phone] [facility] office  9/22/2020  4:16 PM

## 2020-09-22 NOTE — H&P
Hospital Medicine History & Physical      PCP: ANICETO Hollis - CNP    Date of Admission: 9/22/2020    Date of Service: Pt seen/examined on 9/22/2020 and Admitted to Inpatient with expected LOS greater than two midnights due to medical therapy. Chief Complaint: Transfer from [facility] for abnormal stress test, need for left heart cath      History Of Present Illness:    64 y.o. male who presented to [facility] from [facility] inpatient to Cath Lab  Patient initially presented to [facility] ED with complaints of chest pain and shortness of breath. Patient with history of COPD, OANH. CHF, on Lasix twice daily at home. Patient was evaluated in [facility] for his symptoms, ACS ruled out with serial cardiac enzymes, COVID test was negative. Ultimately underwent a stress test which was abnormal.  Transferred to [facility] for an elective left heart cath today. Patient found to have multivessel CAD. Patient will be referred to CT surgery for potential CABG.     Past Medical History:          Diagnosis Date    Anxiety 1/6/2020    Aortic valve calcification 09/2020    seen on lung CT    Chronic obstructive pulmonary disease ([address] 9/3/2019    PFT done 9/03/19    Coronary artery calcification 09/2020    seen on lung ct    Crush injury of right foot 7/23/2015    DDD (degenerative disc disease), lumbar 1/6/2020    Erectile dysfunction 1/6/2020    Fatigue 1/6/2020    History of alcohol abuse 1/6/2020    History of drug use     HNP (herniated nucleus pulposus), lumbar 1/6/2020    Hyperglycemia 1/6/2020    Hypersomnia 1/6/2020    Kidney stone     Lumbar radiculopathy 1/6/2020    Lumbar spine pain 1/6/2020    LVH (left ventricular hypertrophy)     seen on echo 8/2020, moderate    Observed sleep apnea 1/6/2020    Reactive depression 1/6/2020    Spondylosis without myelopathy or radiculopathy, lumbar region 1/6/2020    Tobacco use 1/6/2020       Past Surgical History:          Procedure Laterality Date    CHOLECYSTECTOMY      CYSTOSCOPY  03/16/2011    cystoscopy left ureteroscopy, left retrograde, double J stent placement    PAIN MANAGEMENT PROCEDURE Right 12/24/2019    RIGHT LUMBAR FIVE SACRAL ONE TRANSFORAMINAL EPIDURAL STEROID INJECTION SITE CONFIRMED BY FLUOROSCOPY performed by Ron Galdamez MD at [address] [facility] Right 1/7/2020    RIGHT LUMBAR FOUR AND LUMBAR FIVE TRANSFORAMINAL EPIDURAL STEROID INJECTION SITE CONFIRMED BY FLUOROSCOPY performed by Ron Galdamez MD at [place] [address]       Medications Prior to Admission:      Prior to Admission medications    Medication Sig Start Date End Date Taking?  Authorizing Provider   atorvastatin (LIPITOR) 10 MG tablet Take 1 tablet by mouth daily 9/17/20   Christopher Hardy APRN - CNP   furosemide (LASIX) 20 MG tablet TAKE ONE TABLET BY MOUTH EVERY MORNING 9/9/20   ANICETO Galvan - CNP   venlafaxine (EFFEXOR XR) 75 MG extended release capsule TAKE ONE CAPSULE BY MOUTH DAILY 8/7/20   ANICETO Galvan - CNP   traZODone (DESYREL) 50 MG tablet TAKE ONE TABLET BY MOUTH ONCE NIGHTLY AS NEEDED FOR SLEEP 8/7/20   ANICETO Galvan - CNP   potassium chloride (KLOR-CON) 10 MEQ extended release tablet TAKE ONE TABLET BY MOUTH DAILY 7/14/20   ANICETO Martinez CNP   albuterol (PROVENTIL) (2.5 MG/3ML) 0.083% nebulizer solution Take 3 mLs by nebulization every 6 hours as needed for Wheezing or Shortness of Breath 9/25/19   ANICETO Galvan - CNP   ipratropium (ATROVENT) 0.02 % nebulizer solution Take 2.5 mLs by nebulization 4 times daily as needed for Wheezing (short of breath) 9/25/19   ANICETO Galvan - CNP   albuterol sulfate HFA (PROVENTIL HFA) 108 (90 Base) MCG/ACT inhaler Inhale 2 puffs into the lungs every 6 hours as needed for Wheezing 5/17/19   Sophie Paige MD       Allergies:  Codeine and Dilaudid [hydromorphone hcl]    Social

## 2020-09-22 NOTE — PROGRESS NOTES
Beatrice Alejandro called from the cathlab, gave updated vitals to her. Waiting on aircare. Patient called wife and informed her of the update.

## 2020-09-22 NOTE — CONSULTS
Pharmacy to Manage Heparin Infusion per [facility]    Dx: ACS, CABG   Pt wt = 87.6 kg adjusted   Baseline aPTT = 35.7 sec at 1253  Starting drip at 1830     Low Dose Heparin Infusion  Heparin 60 units/kg IVP bolus followed by Heparin infusion at 12 units/kg/hr. Recheck aPTT in 6 hours. Goal aPTT = 49-76 seconds. aPTT < 36.3    Heparin 60 units/kg bolus  Increase infusion by 4 units/kg/hr  aPTT 36.3 - 48.9 Heparin 30 units/kg bolus Increase infusion by 2 units/kg/hr  aPTT 49 - 76    No bolus No change   aPTT 76.1 - 85 No bolus Decrease infusion by 2 units/kg/hr  aPTT 85.1 - 94 Hold heparin for 1 hour Decrease infusion by 3 units/kg/hr  aPTT 94.1 - 103    Hold heparin for 1 hour Decrease infusion by 4 units/kg/hr  aPTT > 103 Hold heparin for 1 hour Decrease infusion by 6 units/kg/hr    Obtain aPTT 6 hours after bolus and 6 hours after any dose change until two consecutive therapeutic aPTT are achieved- then daily. Que Porter, PharmD 9/22/2020 5:41 PM    9/23 0014  aptt = 39.2 sec  2000 bolus; rate = 11.8 ml/hr  Next aptt 0900  Reinier Peterson.9/23/2020 2:39 AM    9/23 0907  aPTT - 42.6 sec  Give 2000 unit heparin bolus and increase heparin drip to 13.6 mL/hr  Next aPTT at [facility] 9/23/2020 10:15 AM    9/23 at 1759  -aPTT=48.7 seconds  Increase drip to 15.4 ml/hr, no bolus due to 49 seconds being the cutoff for therapeutic range  Check aPTT in 6 hours  Karlyn Cowden, PharmD, BCCCP, BCPS   9/23/2020 6:43 PM     9/24  aPTT = 60.6 sec at 0046. Continue heparin gtt at 15.4 mL/hr. Recheck aPTT in 6 hours.   Marguerite Celis PharmD  9/24/2020 2:15 AM    9/24  aPTT = 61.3 sec at 0641  No changes needed  Continue Heparin infusion at 15.4 mL/hr  Daily aPTT ordered  Vale Johnson PharmD 9/24/2020  8:09 AM    9/25  aPTT = 49.8 sec at 0525  No changes needed  Continue Heparin infusion rate at 15.4 mL/hr  Daily aPTT ordered  Vale Johnson PharmD 9/25/2020  7:35 AM

## 2020-09-22 NOTE — PROGRESS NOTES
Patient is resting showing no s/s of distress. Patient is alert and oriented. Meds were held because patient had second part of stress test the morning. Patient is denying any needs. Bed is in lowest position and call light is within reach. Will continue to monitor. Shift assessment complete, see flowsheets.

## 2020-09-22 NOTE — POST SEDATION
Patient:  Michelle Honeycutt   :   1960    A pre-sedation re-evaluation was performed immediately at the end of the procedure. Procedure:  Cardiac cath  Medications: Procedural sedation with minimal conscious sedation  Complications: None  Estimated Blood Loss: none  Specimens: Were not obtained        Ricki Medication and Procedural Reconciliation:  I agree that the documented medications and procedures performed are true. The medications were given under my order. The procedures were performed under my direct supervision.

## 2020-09-22 NOTE — PROGRESS NOTES
Dr. Mayra Farooq called back and states that a 1630  will be too late. Cancel the transport and arrange for aircare.  He states that he spoke with Dr. Liseth Rivero

## 2020-09-22 NOTE — CONSULTS
malnutrition ([facility]    Acute respiratory failure ([facility]    Acute on chronic respiratory failure with hypoxemia (HCC)    Acute diastolic congestive heart failure (HCC)    Chronic diastolic congestive heart failure (HCC)    Hyperlipidemia    Essential hypertension    Insomnia    Morbid obesity with BMI of 45.0-49.9, adult (HCC)    Unstable angina (HCC)    Abnormal cardiovascular stress test         Cardiac Testing: I have reviewed the findings below. EKG:  ECHO:   STRESS TEST:  CATH:  BYPASS:  VASCULAR:    Past Medical History:   has a past medical history of Anxiety, Aortic valve calcification, Chronic obstructive pulmonary disease ([facility] Coronary artery calcification, Crush injury of right foot, DDD (degenerative disc disease), lumbar, Erectile dysfunction, Fatigue, History of alcohol abuse, History of drug use, HNP (herniated nucleus pulposus), lumbar, Hyperglycemia, Hypersomnia, Kidney stone, Lumbar radiculopathy, Lumbar spine pain, LVH (left ventricular hypertrophy), Observed sleep apnea, Reactive depression, Spondylosis without myelopathy or radiculopathy, lumbar region, and Tobacco use. Surgical History:   has a past surgical history that includes Cholecystectomy; Cystoscopy (03/16/2011); Pain management procedure (Right, 12/24/2019); and Pain management procedure (Right, 1/7/2020). Social History:   reports that he has quit smoking. He started smoking about 46 years ago. He has a 70.00 pack-year smoking history. He has never used smokeless tobacco. He reports previous drug use. He reports that he does not drink alcohol. Family History:  No evidence for sudden cardiac death or premature CAD    Medications:  Reviewed and are listed in nursing record. and/or listed below  Outpatient Medications:  Prior to Admission medications    Medication Sig Start Date End Date Taking?  Authorizing Provider   atorvastatin (LIPITOR) 10 MG tablet Take 1 tablet by mouth daily 9/17/20   Tammy Pond APRN Supple, symmetrical, trachea midline, no adenopathy, thyroid: not enlarged, symmetric, no tenderness/mass/nodules, no carotid bruit or JVD       Lungs:   Clear to auscultation bilaterally, respirations unlabored   Chest Wall:  No tenderness or deformity   Heart:  Regular rhythm and normal rate; S1, S2 are normal; no murmur noted; no rub or gallop   Abdomen:   Soft, obese, non-tender, bowel sounds active all four quadrants,  no masses, no organomegaly           Extremities: Extremities normal, atraumatic, no cyanosis or edema   Pulses: 2+ and symmetric   Skin: Skin color, texture, turgor normal, no rashes or lesions   Pysch: Normal mood and distant affect   Neurologic: Normal gross motor and sensory exam.         Labs  Recent Labs     09/22/20  0537 09/22/20  1253   WBC 15.3* 14.6*   HGB 16.4 18.1*   HCT 49.8 53.7*   MCV 89.4 88.7    208     Recent Labs     09/21/20  0451 09/22/20  0537   CREATININE 0.8* 0.9   BUN 21* 23*   * 135*   K 4.7 4.0   CL 94* 96*   CO2 28 33*     No results for input(s): INR, PROTIME in the last 72 hours. No results for input(s): TROPONINI in the last 72 hours. Invalid input(s): PRO-BNP  No results for input(s): CHOL, HDL in the last 72 hours. Invalid input(s): LDL, TG      Imaging:  I have reviewed the below testing personally and my interpretation is below. EKG:  CXR:      Assessment:  64 y.o. patient with:  Problem List Items Addressed This Visit     None      Principal Problem:    Unstable angina (HCC)  Active Problems:    Abnormal cardiovascular stress test  Resolved Problems:    * No resolved hospital problems. *      Plan:  1. 1. I had the opportunity to review the Sammi Harrington clinical presentation and the available pertinent data. With the patient's clinical risk factors and symptoms, there is a high pretest likelihood for CAD. I have asked Sammi Harrington to undergo cardiac angiography for further diagnostic testing. The procedure was explained in depth.

## 2020-09-22 NOTE — CONSULTS
CARDIOLOGY CONSULTATION        Patient Name: Viktor Nunez  Date of admission: 9/18/2020  9:06 AM  Admission Dx: Chest pain [R07.9]  Chest pain [R07.9]  Requesting Physician: Abel Melissa MD  Primary Care physician: Jodi Goldberg, APRN - CNP    Reason for Consultation/Chief Complaint: Chest pain     History of Present Illness:     Viktor Nunez is a 64 y.o. patient who presented to the hospital with complaints of chest pain. The patient has prior history of congestive heart failure with recent admission, obesity, prior 5 pack per day smoker for 15-20 years, COPD on nocturnal oxygen, OANH non-compliant with CPAP. The patient reports 1 year history of chest pain and shortness of breath. The patient describes the chest pain as cramping in quality and substernal in location without radiation. Chest pain occurring multiple times per day at times. Associated symptoms include Dyspnea, palpitations and nausea. Denies diaphoresis, vomiting/palpitations with episodes. Reliable aggravating factors include include taking a shower or walking to the mailbox which bring on both the chest pain and dyspnea. Denies shortness of breath at rest. He is presently having low level chest discomfort 2/10 during my evaluation that resolved on its own. Alleviating factors include: none. He came through [address] ED for these symptoms and was subsequently transferred to [facility] for further evaluation. Denies near-syncope or nancy syncope. Endorses paroxysmal nocturnal dyspnea nightly if he skips nocturnal oxygen therapy. No orthopnea, bendopnea, increasing lower extremity edema. Weight he states has been up and down. Taking lasix 20mg BID as ordered. Does not take baby aspirin. Not taking lipitor yet on OP basis as was unable to  prior to admission. Of note, He was admitted 3 weeks ago to this facility for congestive heart failure. Ruled out for COVID-19 at that time.  Diuresed 12lbs per his history. He felt his dyspnea was improved but continued to have anginal episodes. He has COPD/OANH and uses oxygen at night. Presently wearing oxygen however as it makes him feel more comfortable now. Course. Serial cardiac enzymes negative. Elevated WBC thought 2/2 steroids. COVID testing negative 9/19. EKG with normal sinus rhythm, inferior infarct pattern, probable prior anterior infarct, no ST-T wave changes. Stress testing today abnormal.      Past Medical History:   has a past medical history of Anxiety, Aortic valve calcification, Chronic obstructive pulmonary disease ([address] Coronary artery calcification, Crush injury of right foot, DDD (degenerative disc disease), lumbar, Erectile dysfunction, Fatigue, History of alcohol abuse, History of drug use, HNP (herniated nucleus pulposus), lumbar, Hyperglycemia, Hypersomnia, Kidney stone, Lumbar radiculopathy, Lumbar spine pain, LVH (left ventricular hypertrophy), Observed sleep apnea, Reactive depression, Spondylosis without myelopathy or radiculopathy, lumbar region, and Tobacco use. Surgical History:   has a past surgical history that includes Cholecystectomy; Cystoscopy (03/16/2011); Pain management procedure (Right, 12/24/2019); and Pain management procedure (Right, 1/7/2020). Social History:   reports that he has quit smoking. He started smoking about 46 years ago. He has a 70.00 pack-year smoking history. He has never used smokeless tobacco. He reports previous drug use. He reports that he does not drink alcohol. Family History:  family history includes Heart Disease in his mother; High Blood Pressure in his sister; Liver Disease in his father; No Known Problems in his sister; Other in his mother. Home Medications:  Were reviewed and are listed in nursing record and/or below  Prior to Admission medications    Medication Sig Start Date End Date Taking?  Authorizing Provider   atorvastatin (LIPITOR) 10 MG tablet Take 1 tablet by mouth (PHENERGAN) tablet 12.5 mg, Q6H PRN    Or  ondansetron (ZOFRAN) injection 4 mg, Q6H PRN  aspirin chewable tablet 81 mg, Daily  atorvastatin (LIPITOR) tablet 10 mg, Nightly  amLODIPine (NORVASC) tablet 5 mg, Daily  albuterol sulfate  (90 Base) MCG/ACT inhaler 2 puff, Q6H PRN  enoxaparin (LOVENOX) injection 135 mg, BID        Allergies:  Codeine and Dilaudid [hydromorphone hcl]     Review of Systems:   A 14 point review of symptoms completed. Pertinent positives identified in the HPI, all other review of symptoms negative as below. Objective:     Vitals:    09/22/20 0232 09/22/20 0240 09/22/20 1015 09/22/20 1114   BP: 119/71  133/87    Pulse: 72  76    Resp: 18  18 18   Temp: 97.6 °F (36.4 °C)  97.9 °F (36.6 °C)    TempSrc: Oral  Oral    SpO2: 96%  97% 97%   Weight:  287 lb 11.2 oz (130.5 kg)     Height:          Weight: 287 lb 11.2 oz (130.5 kg)       PHYSICAL EXAM:    General:  Alert, cooperative, no distress, appears stated age   Head:  Normocephalic, atraumatic   Eyes:  Conjunctiva/corneas clear, anicteric sclerae    Nose: Nares normal, no drainage or sinus tenderness   Throat: No abnormalities of the lips, oral mucosa or tongue. Neck: Trachea midline. Neck supple with no lymphadenopathy, thyroid not enlarged, symmetric, no tenderness/mass/nodules, no Jugular venous pressure elevation    Lungs:   Clear to auscultation bilaterally, no wheezes, rales at the bases. Chest Wall:  No deformity or tenderness to palpation   Heart:  Regular rate and rhythm, normal S1, normal S2, no murmur, no rub, no S3/S4, PMI non-palpable    Abdomen:   Obese, Soft, non-tender, with normoactive bowel sounds. No masses, no hepatosplenomegaly   Extremities: No cyanosis, clubbing. Trace pitting    Vascular: 2+ radial, brachial, femoral, dorsalis pedis and posterior tibial pulses bilaterally. Brisk carotid upstrokes without carotid bruit. Skin: Skin color, texture, turgor are normal with no rashes or ulceration.     Pysch: filling pressure. Mild mitral regurgitation. Treadmill stress echo 8/2019  Summary   Technically difficult study   No obvious wall motion abnormalities, no clear areas of ischemia   Normal stress ECHO. Stress Test:  Stress Interpretation     Left ventricular volume slightly increased with normal post-stress     left ventricular ejection fraction of 56%. Decreased myocardial     thickening and hypokinesis of the basal to mid inferolateral     segments.     There is a large area (>20% by SDS) of moderate-severe, decreased     tracer uptake noted of the entire lateral wall, the mid to apical     anterior segments, and apex with stress. There is severely     decreased, partially fixed defect noted of the inferolateral wall.     This is a highly abnormal study.             Summary     Increase left ventricular systolic volume with hypokinesis/decreased     myocardial thickening of the inferolateral segment. Large area of inducible     ischemia of the LAD and left circumflex territories. Post-stress LVEF normal     at 56%.     Overall findings represent highly abnormal study, high risk scan. Cardiac catheterization: N/A    Additional studies: N/A    Impression and Plan:      1. Unstable angina/angina at rest.   2. Highly abnormal stress test  3. Congestive heart failure - near euvolemia  4. Dyspnea on exertion  5. Hyperlipidemia  6. Hypertension  7. Prior tobacco use, now quit  8.  Obesity     Patient Active Problem List   Diagnosis    Crush injury of right foot    Shortness of breath    Chest pain    Erectile dysfunction    Hyperglycemia    Anxiety    Depression    Tobacco abuse    History of alcohol abuse    Fatigue    OANH (obstructive sleep apnea)    Hypersomnia    Chronic obstructive pulmonary disease (HCC)    Lumbar radiculopathy    HNP (herniated nucleus pulposus), lumbar    Spondylosis without myelopathy or radiculopathy, lumbar region    DDD (degenerative disc disease), lumbar  Lumbar spine pain    PNA (pneumonia)    Pneumonia    Acute respiratory distress    Class 3 severe obesity with body mass index (BMI) of 45.0 to 49.9 in adult [facility])    Pneumonia, primary atypical    Acute respiratory failure with hypoxia (HCC)    Moderate protein-calorie malnutrition (HCC)    Acute respiratory failure (HCC)    Acute on chronic respiratory failure with hypoxemia (HCC)    Acute diastolic congestive heart failure (HCC)    Chronic diastolic congestive heart failure (HCC)    Hyperlipidemia    Essential hypertension    Insomnia    Morbid obesity with BMI of 45.0-49.9, adult ([address]       Plan:  1. Start heparin gtt (last dose lovenox last evening). Aspirin loaded at admission, baby aspirin x 1 now. No P2y12 load as possible multivessel disease. 2. Metoprolol 25mg BID with first dose now initiated   3. Continue amlodipine at present dosing  4. Continue lasix PO regimen  5. SL NTG PRN chest pain  6. Statin increased to high intensity    Ultimately, this patient warrants transfer to [facility] for cardiac catheterization and possible PCI. Arranging for transfer as soon as possible, possible C today as he is NPO. I will connect with interventional MD on call. The patient agrees to [facility] and transfer. I will address the patient's cardiac risk factors and adjusted pharmacologic treatment as needed. In addition, I have reinforced the need for patient directed risk factor modification. All questions and concerns were addressed to the patient/family. Alternatives to my treatment were discussed. Thank you for allowing us to participate in the care of Lata Shoemaker. Please call me with any questions [phone].     Stephan Sims MD   Cardiovascular Disease  [place] [address]  [phone] [address] [place] [address]  [phone] [address] [place] [address]  9/22/2020 12:04 PM

## 2020-09-22 NOTE — PROGRESS NOTES
Spoke with Ilene Kovacs from [facility] re transport p/u @16:30 need to cancel pt going by air [id number]

## 2020-09-23 LAB
ANION GAP SERPL CALCULATED.3IONS-SCNC: 9 MMOL/L (ref 3–16)
APTT: 39.2 SEC (ref 24.2–36.2)
APTT: 42.6 SEC (ref 24.2–36.2)
APTT: 48.7 SEC (ref 24.2–36.2)
BILIRUBIN URINE: NEGATIVE
BLOOD, URINE: NEGATIVE
BUN BLDV-MCNC: 28 MG/DL (ref 7–20)
CALCIUM SERPL-MCNC: 8.9 MG/DL (ref 8.3–10.6)
CHLORIDE BLD-SCNC: 103 MMOL/L (ref 99–110)
CLARITY: CLEAR
CO2: 27 MMOL/L (ref 21–32)
COLOR: YELLOW
CREAT SERPL-MCNC: 0.9 MG/DL (ref 0.9–1.3)
GFR AFRICAN AMERICAN: >60
GFR NON-AFRICAN AMERICAN: >60
GLUCOSE BLD-MCNC: 107 MG/DL (ref 70–99)
GLUCOSE URINE: NEGATIVE MG/DL
KETONES, URINE: NEGATIVE MG/DL
LEUKOCYTE ESTERASE, URINE: NEGATIVE
MICROSCOPIC EXAMINATION: NORMAL
NITRITE, URINE: NEGATIVE
PH UA: 7 (ref 5–8)
POTASSIUM SERPL-SCNC: 3.8 MMOL/L (ref 3.5–5.1)
PROTEIN UA: NEGATIVE MG/DL
SARS-COV-2, NAA: NOT DETECTED
SODIUM BLD-SCNC: 139 MMOL/L (ref 136–145)
SPECIFIC GRAVITY UA: 1.01 (ref 1–1.03)
URINE REFLEX TO CULTURE: NORMAL
URINE TYPE: NORMAL
UROBILINOGEN, URINE: 1 E.U./DL

## 2020-09-23 PROCEDURE — 2060000000 HC ICU INTERMEDIATE R&B

## 2020-09-23 PROCEDURE — 6370000000 HC RX 637 (ALT 250 FOR IP): Performed by: NURSE PRACTITIONER

## 2020-09-23 PROCEDURE — 99233 SBSQ HOSP IP/OBS HIGH 50: CPT | Performed by: INTERNAL MEDICINE

## 2020-09-23 PROCEDURE — 80048 BASIC METABOLIC PNL TOTAL CA: CPT

## 2020-09-23 PROCEDURE — 36415 COLL VENOUS BLD VENIPUNCTURE: CPT

## 2020-09-23 PROCEDURE — 85730 THROMBOPLASTIN TIME PARTIAL: CPT

## 2020-09-23 PROCEDURE — 2700000000 HC OXYGEN THERAPY PER DAY

## 2020-09-23 PROCEDURE — 94761 N-INVAS EAR/PLS OXIMETRY MLT: CPT

## 2020-09-23 PROCEDURE — 6370000000 HC RX 637 (ALT 250 FOR IP): Performed by: INTERNAL MEDICINE

## 2020-09-23 PROCEDURE — 6360000002 HC RX W HCPCS: Performed by: INTERNAL MEDICINE

## 2020-09-23 PROCEDURE — 94640 AIRWAY INHALATION TREATMENT: CPT

## 2020-09-23 PROCEDURE — 2580000003 HC RX 258: Performed by: INTERNAL MEDICINE

## 2020-09-23 PROCEDURE — 81003 URINALYSIS AUTO W/O SCOPE: CPT

## 2020-09-23 PROCEDURE — C9113 INJ PANTOPRAZOLE SODIUM, VIA: HCPCS | Performed by: INTERNAL MEDICINE

## 2020-09-23 RX ORDER — HEPARIN SODIUM 1000 [USP'U]/ML
2000 INJECTION, SOLUTION INTRAVENOUS; SUBCUTANEOUS ONCE
Status: COMPLETED | OUTPATIENT
Start: 2020-09-23 | End: 2020-09-23

## 2020-09-23 RX ORDER — CHOLECALCIFEROL (VITAMIN D3) 125 MCG
5 CAPSULE ORAL NIGHTLY PRN
Status: DISCONTINUED | OUTPATIENT
Start: 2020-09-23 | End: 2020-09-29 | Stop reason: HOSPADM

## 2020-09-23 RX ADMIN — FUROSEMIDE 20 MG: 20 TABLET ORAL at 08:58

## 2020-09-23 RX ADMIN — Medication 2 PUFF: at 09:15

## 2020-09-23 RX ADMIN — METOPROLOL TARTRATE 25 MG: 25 TABLET, FILM COATED ORAL at 08:58

## 2020-09-23 RX ADMIN — PANTOPRAZOLE SODIUM 40 MG: 40 INJECTION, POWDER, FOR SOLUTION INTRAVENOUS at 08:58

## 2020-09-23 RX ADMIN — ASPIRIN 81 MG: 81 TABLET, CHEWABLE ORAL at 08:57

## 2020-09-23 RX ADMIN — VENLAFAXINE HYDROCHLORIDE 75 MG: 37.5 CAPSULE, EXTENDED RELEASE ORAL at 08:58

## 2020-09-23 RX ADMIN — Medication 2 PUFF: at 20:45

## 2020-09-23 RX ADMIN — POTASSIUM CHLORIDE 10 MEQ: 750 TABLET, EXTENDED RELEASE ORAL at 08:58

## 2020-09-23 RX ADMIN — HEPARIN SODIUM 2000 UNITS: 1000 INJECTION INTRAVENOUS; SUBCUTANEOUS at 11:37

## 2020-09-23 RX ADMIN — METOPROLOL TARTRATE 25 MG: 25 TABLET, FILM COATED ORAL at 21:14

## 2020-09-23 RX ADMIN — ACETAMINOPHEN 650 MG: 325 TABLET ORAL at 09:08

## 2020-09-23 RX ADMIN — MELATONIN TAB 5 MG 5 MG: 5 TAB at 21:23

## 2020-09-23 RX ADMIN — AMLODIPINE BESYLATE 5 MG: 5 TABLET ORAL at 08:58

## 2020-09-23 RX ADMIN — HEPARIN SODIUM 2000 UNITS: 1000 INJECTION INTRAVENOUS; SUBCUTANEOUS at 03:18

## 2020-09-23 RX ADMIN — Medication 10 ML: at 08:58

## 2020-09-23 RX ADMIN — ATORVASTATIN CALCIUM 80 MG: 80 TABLET, FILM COATED ORAL at 21:14

## 2020-09-23 ASSESSMENT — PAIN SCALES - GENERAL
PAINLEVEL_OUTOF10: 4
PAINLEVEL_OUTOF10: 0
PAINLEVEL_OUTOF10: 0

## 2020-09-23 NOTE — PROGRESS NOTES
RESPIRATORY THERAPY ASSESSMENT    Name:  Libia Camilo  Medical Record Number:  [id number]  Age: 64 y.o. Gender: male  : 1960  Today's Date:  2020  Room:  6175/6124-63    Assessment     Is the patient being admitted for a COPD or Asthma exacerbation? No   (If yes the patient will be seen every 4 hours for the first 24 hours and then reassessed)    Patient Admission Diagnosis      Allergies  Allergies   Allergen Reactions    Codeine Itching    Dilaudid [Hydromorphone Hcl]        Minimum Predicted Vital Capacity:     na          Actual Vital Capacity:      na              Pulmonary History:COPD  Home Oxygen Therapy:  room air   Home Respiratory Therapy:Albuterol MDI PRN                           Current Respiratory Therapy: duoneb 4x daily symbicort BID  Treatment Type: MDI  Medications: Budesonide/Formoterol     Respiratory Severity Index(RSI)   Patients with orders for inhalation medications, oxygen, or any therapeutic treatment modality will be placed on Respiratory Protocol. They will be assessed with the first treatment and at least every 72 hours thereafter. The following severity scale will be used to determine frequency of treatment intervention.     Smoking History: Pulmonary Disease or Smoking History, Greater than 15 pack year = 2    Social History  Social History     Tobacco Use    Smoking status: Former Smoker     Packs/day: 2.00     Years: 35.00     Pack years: 70.00     Start date:     Smokeless tobacco: Never Used   Substance Use Topics    Alcohol use: No    Drug use: Not Currently     Comment: hx of drug use       Recent Surgical History: None = 0  Past Surgical History  Past Surgical History:   Procedure Laterality Date    CHOLECYSTECTOMY      CYSTOSCOPY  2011    cystoscopy left ureteroscopy, left retrograde, double J stent placement    PAIN MANAGEMENT PROCEDURE Right 2019    RIGHT LUMBAR FIVE SACRAL ONE TRANSFORAMINAL EPIDURAL STEROID INJECTION SITE CONFIRMED BY FLUOROSCOPY performed by Karina Brunner MD at [address] Right 1/7/2020    RIGHT LUMBAR FOUR AND LUMBAR FIVE TRANSFORAMINAL EPIDURAL STEROID INJECTION SITE CONFIRMED BY [facility]OR[facility]COPY [facility]       Level of Consciousness: Alert, Oriented, and Cooperative = 0    Level of Activity: Walking unassisted = 0    Respiratory Pattern: Regular Pattern; RR 8-20 = 0    Breath Sounds: Diminshed bilaterally and/or crackles = 2    Sputum   ,  ,    Cough: Strong, spontaneous, non-productive = 0    Vital Signs   /67   Pulse 80   Temp 98 °F (36.7 °C) (Oral)   Resp 16   Ht 5' 4\" (1.626 m)   Wt 287 lb (130.2 kg)   SpO2 92%   BMI 49.26 kg/m²   SPO2 (COPD values may differ): Greater than or equal to 92% on room air = 0    Peak Flow (asthma only): not applicable = 0    RSI: 5-6 = Q4hr PRN (every four hours as needed) for dyspnea        Plan       Goals: medication delivery, mobilize retained secretions, volume expansion and improve oxygenation    Patient/caregiver was educated on the proper method of use for Respiratory Care Devices:  Yes      Level of patient/caregiver understanding able to:   ? Verbalize understanding   ? Demonstrate understanding       ? Teach back        ? Needs reinforcement       ? No available caregiver               ? Other:     Response to education:  Good     Is patient being placed on Home Treatment Regimen? Yes     Does the patient have everything they need prior to discharge? NA     Comments: pt assessed     Plan of Care:Symbicort BID and Duoneb PRN    Electronically signed by Amanda Mcnamara RCP on 9/22/2020 at 10:04 PM    Respiratory Protocol Guidelines     1. Assessment and treatment by Respiratory Therapy will be initiated for medication and therapeutic interventions upon initiation of aerosolized medication.   2. Physician will be contacted for respiratory rate (RR) greater than 35 breaths per minute. Therapy will be held for heart rate (HR) greater than 140 beats per minute, pending direction from physician. 3. Bronchodilators will be administered via Metered Dose Inhaler (MDI) with spacer when the following criteria are met:  a. Alert and cooperative     b. HR < 140 bpm  c. RR < 30 bpm                d. Can demonstrate a 2-3 second inspiratory hold  4. Bronchodilators will be administered via Hand Held Nebulizer [facility] to patients when ANY of the following criteria are met  a. Incognizant or uncooperative          b. Patients treated with HHN at Home        c. Unable to demonstrate proper use of MDI with spacer     d. RR > 30 bpm   5. Bronchodilators will be delivered via Metered Dose Inhaler (MDI), HHN, Aerogen to intubated patients on mechanical ventilation. 6. Inhalation medication orders will be delivered and/or substituted as outlined below. Aerosolized Medications Ordering and Administration Guidelines:    1. All Medications will be ordered by a physician, and their frequency and/or modality will be adjusted as defined by the patients Respiratory Severity Index (RSI) score. 2. If the patient does not have documented COPD, consider discontinuing anticholinergics when RSI is less than 9.  3. If the bronchospasm worsens (increased RSI), then the bronchodilator frequency can be increased to a maximum of every 4 hours. If greater than every 4 hours is required, the physician will be contacted. 4. If the bronchospasm improves, the frequency of the bronchodilator can be decreased, based on the patient's RSI, but not less than home treatment regimen frequency. 5. Bronchodilator(s) will be discontinued if patient has a RSI less than 9 and has received no scheduled or as needed treatment for 72  Hrs. Patients Ordered on a Mucolytic Agent:    1. Must always be administered with a bronchodilator.     2. Discontinue if patient experiences worsened bronchospasm, or secretions have lessened to the point that the patient is able to clear them with a cough. Anti-inflammatory and Combination Medications:    1. If the patient lacks prior history of lung disease, is not using inhaled anti-inflammatory medication at home, and lacks wheezing by examination or by history for at least 24 hours, contact physician for possible discontinuation.

## 2020-09-23 NOTE — CONSULTS
Department of Cardiovascular & Thoracic Surgery  History and Physical          DIAGNOSIS: Multivessel CAD     CHIEF COMPLAINT:  Chest pain & LEE     History Obtained From:  patient, spouse, electronic medical record    HISTORY OF PRESENT ILLNESS:      The patient is a 64 y.o. super obese male prior 5 PPD smoker (quit 10 days ago) with significant past medical history of COPD (wears 2L oxygen at night), DDD, and anxiety who presented with c/o chest pain to [facility] and was transferred to [facility] for further evaluation. Cardiology was consulted and during his work up performed a stress test, which was abnormal. He was transferred to [facility] for cardiac catheterization which revealed multivessel CAD not amenable to PCI. We have been consulted for surgical revascularization.      Past Medical History:        Diagnosis Date    Anxiety 1/6/2020    Aortic valve calcification 09/2020    seen on lung CT    Chronic obstructive pulmonary disease ([facility] [address] 9/3/2019    PFT done 9/03/19    Coronary artery calcification 09/2020    seen on lung ct    Crush injury of right foot 7/23/2015    DDD (degenerative disc disease), lumbar 1/6/2020    Erectile dysfunction 1/6/2020    Fatigue 1/6/2020    History of alcohol abuse 1/6/2020    History of drug use     HNP (herniated nucleus pulposus), lumbar 1/6/2020    Hyperglycemia 1/6/2020    Hypersomnia 1/6/2020    Kidney stone     Lumbar radiculopathy 1/6/2020    Lumbar spine pain 1/6/2020    LVH (left ventricular hypertrophy)     seen on echo 8/2020, moderate    Observed sleep apnea 1/6/2020    Reactive depression 1/6/2020    Spondylosis without myelopathy or radiculopathy, lumbar region 1/6/2020    Tobacco use 1/6/2020       Past Surgical History:        Procedure Laterality Date    CHOLECYSTECTOMY      CYSTOSCOPY  03/16/2011    cystoscopy left ureteroscopy, left retrograde, double J stent placement    PAIN MANAGEMENT PROCEDURE Right 12/24/2019    RIGHT LUMBAR FIVE SACRAL ONE TRANSFORAMINAL EPIDURAL STEROID INJECTION SITE CONFIRMED BY FLUOROSCOPY performed by Dar Mckee MD at [address] Right 1/7/2020    RIGHT LUMBAR FOUR AND LUMBAR FIVE TRANSFORAMINAL EPIDURAL STEROID INJECTION SITE CONFIRMED BY FLUOROSCOPY performed by Dar Mckee MD at [facility]       Medications Prior to Admission:   Medications Prior to Admission: atorvastatin (LIPITOR) 10 MG tablet, Take 1 tablet by mouth daily  furosemide (LASIX) 20 MG tablet, TAKE ONE TABLET BY MOUTH EVERY MORNING  venlafaxine (EFFEXOR XR) 75 MG extended release capsule, TAKE ONE CAPSULE BY MOUTH DAILY  traZODone (DESYREL) 50 MG tablet, TAKE ONE TABLET BY MOUTH ONCE NIGHTLY AS NEEDED FOR SLEEP  potassium chloride (KLOR-CON) 10 MEQ extended release tablet, TAKE ONE TABLET BY MOUTH DAILY  albuterol (PROVENTIL) (2.5 MG/3ML) 0.083% nebulizer solution, Take 3 mLs by nebulization every 6 hours as needed for Wheezing or Shortness of Breath  ipratropium (ATROVENT) 0.02 % nebulizer solution, Take 2.5 mLs by nebulization 4 times daily as needed for Wheezing (short of breath)  albuterol sulfate HFA (PROVENTIL HFA) 108 (90 Base) MCG/ACT inhaler, Inhale 2 puffs into the lungs every 6 hours as needed for Wheezing    Allergies:  Codeine and Dilaudid [hydromorphone hcl]    Social History:    TOBACCO:   reports that he has quit smoking. He started smoking about 46 years ago. He has a 70.00 pack-year smoking history. He has never used smokeless tobacco.  ETOH:   reports no history of alcohol use. CAFFEINE ABUSE:  Yes  DRUGS:   reports previous drug use.   LIFESTYLE: sedentary    MARITAL STATUS:   to a nurse  OCCUPATION:   (self-employed)     Family History:        Problem Relation Age of Onset    Heart Disease Mother     Other Mother         copd    Liver Disease Father     High Blood Pressure Sister     No Known Problems Sister        REVIEW OF SYSTEMS:      Constitutional:  No weight loss.+night sweats, +headaches  Eyes:  No glaucoma, cataracts. ENMT:  No nosebleeds, deviated septum. Cardiac:  No arrhythmias, +chest pain. Vascular:  No claudication, varicosities. GI:  No PUD, heartburn. :  +kidney stones, +UTIs  Musculoskeletal:  No gout. +arthritis  Respiratory:  No asthma. +COPD, +SOB  Integumentary:  No dermatitis, itching, rash. Neurological:  No stroke, TIAs, seizures. Psychiatric:  +depression, +anxiety. Endocrine: No diabetes, thyroid issues. Hematologic:  No bleeding, easy bruising. Immunologic:  No known cancer, steroid therapies. PHYSICAL EXAM:    VITALS:  /75   Pulse 67   Temp 97.6 °F (36.4 °C) (Oral)   Resp 16   Ht 5' 4\" (1.626 m)   Wt 282 lb 14.4 oz (128.3 kg)   SpO2 96%   BMI 48.56 kg/m²     Constitutional:   Well developed and nourished male. No acute distress. +super obesity. Eyes:  lids and lashes normal, pupils equal, round and reactive to light, extra ocular muscles intact, sclera clear, conjunctiva normal +contacts/eyeglasses    Head/ENT:  +edentulous, +upper and lower dentures at home, reports he does not wear them; otherwise normal gums, & palate. Moist mucus membranes. No cyanosis or pallor. Neck:  supple, symmetrical, trachea midline, +thick neck no lymphadenopathy, no jugular venous distension, no carotid bruits and MASSES:  no masses. No thyromegaly. Lungs:  no increased work of breathing, good air exchange, no retractions and clear to auscultation, no crackles or wheezing. No tactile fremitus. Cardiovascular:  regular rate and rhythm, S1, S2 normal, no murmur, click, rub or gallop. Apical impulse in 5th intercostal space. Pulses:  Right dorsalis pedis 2, Left dorsalis pedis 2, Right posterior tibial 1, Left Posterior tibial 1, Right radial 3+, and Left radial 3+. Abdomen:  normal bowel sounds, non-tender, aorta SAMMIE 2/2 body habitus. No hepatosplenomegaly or masses.     Musculoskeletal:  Back is straight and non-tender, full ROM of upper and lower extremities. No kyphosis or scoliosis. Extremities:  Warm, pink, no clubbing, cyanosis, petechiae, ischemia, or deformities. no peripheral edema. Skin: no rashes, no ecchymoses, no petechiae, no nodules, no jaundice, +tattoos on chest and bilateral arms.      Neurological/Psychiatric: oriented, normal mood, CN II-XII intact    DATA:  EK20 Normal sinus rhythm Left axis deviation Low voltage QRS Inferior infarct, age undetermined Cannot rule out Anterior infarct, age undetermined Abnormal ECG No previous ECGs available Confirmed by DODIE LOVE MD ([id number]) on 2020 5:48:23 PM     CBC with Differential:    Lab Results   Component Value Date    WBC 12.7 2020    RBC 5.54 2020    HGB 16.5 2020    HCT 50.2 2020     2020    MCV 90.7 2020    MCH 29.8 2020    MCHC 32.8 2020    RDW 15.3 2020    SEGSPCT 77.6 2011    BANDSPCT 4 2020    LYMPHOPCT 14.9 2020    MONOPCT 6.8 2020    EOSPCT 0.9 2011    BASOPCT 0.3 2020    MONOSABS 1.0 2020    LYMPHSABS 2.3 2020    EOSABS 0.0 2020    BASOSABS 0.0 2020    DIFFTYPE Auto 2011     CMP:    Lab Results   Component Value Date     2020    K 3.8 2020    K 4.5 2020     2020    CO2 27 2020    BUN 28 2020    CREATININE 0.9 2020    GFRAA >60 2020    GFRAA >60 2011    AGRATIO 1.3 2020    LABGLOM >60 2020    GLUCOSE 107 2020    PROT 6.8 2020    PROT 6.9 2011    LABALBU 3.8 2020    CALCIUM 8.9 2020    BILITOT 0.7 2020    ALKPHOS 67 2020    AST 20 2020    ALT 26 2020     Hepatic Function Panel:    Lab Results   Component Value Date    ALKPHOS 67 2020    ALT 26 2020    AST 20 2020    PROT 6.8 2020    PROT 6.9 2011    BILITOT 0.7 2020    LABALBU 3.8 09/19/2020     Last 3 Troponin:    Lab Results   Component Value Date    TROPONINI <0.01 09/18/2020    TROPONINI <0.01 09/18/2020    TROPONINI <0.01 09/18/2020     CXR: 9/18/20 no acute process    ECHO: 8/19/20 with Dr. Jaida Mullins   Summary   LV systolic function is normal with EF estimated at 55-60%. Endocardium not   entirely well visualized but no obvious segmental wall motion abnormalities. There is moderate concentric left ventricular hypertrophy. Normal left ventricular diastolic filling pressure. Mild mitral annular calcification. Valves are not entirely well visualized but there is no obvious structural   abnormality or significant color flow abnormality noted on doppler. Unable to obtain SPAP due to lack of tricuspid regurgitation. CT Chest: 9/18/20   Impression    No evidence of pulmonary embolism or aortic dissection.         Overall, no acute abnormalities detected      LHC: 9/22/20 with Dr. Lizzie Mclean  Findings:   1. Left main coronary artery has moderate calcific disease. It   gave off the left anterior descending artery and left circumflex. 2. Left anterior descending artery has severe atherosclerotic   disease.  It was moderate in size. It gave off a large early   diagonal branch that has ostial 90%. The LAD covered the entire   apex of the left ventricle. 3. Left circumflex has severe atherosclerotic disease.  It was   moderate in size. There was a moderate sized obtuse marginal   branch that is occluded. 4. Right coronary artery has mild atherosclerotic disease. It was   large in size and was the dominant artery.    ~there is faint collaterals to the LAD     5. Left ventriculogram showed normal LVEF at 55-60%. Wall motion   was normal . There was no significant mitral valve or aortic   valve disease noted. LVEDP was normal. There was no gradient   noted across the aortic valve during pullback of the catheter.      Ht 5' 4\" (1.626 m)   Wt 287 lb (130.2 kg)   BMI 49.26 kg/m²     The access site was controlled with manual pressure and/or   appropriate closure device. Moderate Conscious Sedation Details: An independent trained observer pushed medications at my   direction. We monitoring the patient's level of consciousness and   vital signs/physiologic status throughout the procedure.  CPT   codes 08367 and 19759     Start time: 1550   Stop time: 1610   ASA class: 4     Sedation totals:   Versad - 4mg   Fentanyl - 50mcg     EBL - minimal <5 cc blood loss     The patient was monitored continuously with the ECG, pulse   oximetry, blood pressure, and direct observation. CONCLUSIONS:  1. Severe multi-vessel coronary artery disease     ASSESSMENT/RECOMMENDATIONS:  1. Referral for CABG     ASSESSMENT AND PLAN:  STS Cardiac Surgery Risk profile: CABG    Mortality:  2.93%  Renal Failure:  1.90%  Permanent Stroke:  0.56%  Prolonged Ventilation:  21.74%  Deep Sternal Infection:  0.69%  Reoperation:  2.36%  Morbidity and Mortality:  24.62%  Short LOS:  31.87%  Long LOS:  9.45%    Pt is a 65 yo super obese male that quit smoking cigarettes 10 days ago and wears 2L of oxygen at night per his baseline (he has not gone for sleep study yet and follows with Dr. Beata Ryan). His CAD will require surgical revascularization planned for Friday 9/25/20. Will continue with preoperative testing and further risk stratification. Carotid duplex. Vein mapping. PFTs. Urine.      Debbie Storey CNP   9/23/2020  12:32 PM

## 2020-09-23 NOTE — PROGRESS NOTES
distress, appears stated age and cooperative. HEENT: Pupils equal, round, and reactive to light. Conjunctivae/corneas clear. Neck: Supple, with full range of motion. No jugular venous distention. Trachea midline. Respiratory:  Normal respiratory effort. Clear to auscultation, bilaterally without Rales/Wheezes/Rhonchi. Cardiovascular: Regular rate and rhythm with normal S1/S2 without murmurs, rubs or gallops. Abdomen: Soft, non-tender, non-distended with normal bowel sounds. Musculoskeletal: No clubbing, cyanosis or edema bilaterally. Full range of motion without deformity. Skin: Skin color, texture, turgor normal.  No rashes or lesions. Neurologic:  Neurovascularly intact without any focal sensory/motor deficits. Cranial nerves: II-XII intact, grossly non-focal.  Psychiatric: Alert and oriented, thought content appropriate, normal insight  Capillary Refill: Brisk,< 3 seconds   Peripheral Pulses: +2 palpable, equal bilaterally       Labs:   Recent Labs     09/22/20  0537 09/22/20  1253 09/22/20  2047   WBC 15.3* 14.6* 12.7*   HGB 16.4 18.1* 16.5   HCT 49.8 53.7* 50.2    208 185     Recent Labs     09/21/20  0451 09/22/20  0537 09/23/20  0515   * 135* 139   K 4.7 4.0 3.8   CL 94* 96* 103   CO2 28 33* 27   BUN 21* 23* 28*   CREATININE 0.8* 0.9 0.9   CALCIUM 9.4 9.4 8.9     No results for input(s): AST, ALT, BILIDIR, BILITOT, ALKPHOS in the last 72 hours. No results for input(s): INR in the last 72 hours. No results for input(s): Joeline Reeks in the last 72 hours.     Urinalysis:      Lab Results   Component Value Date    NITRU Negative 03/09/2020    WBCUA 3-5 03/09/2020    BACTERIA 1+ 03/09/2020    RBCUA 5-10 03/09/2020    BLOODU TRACE-INTACT 03/09/2020    SPECGRAV 1.020 03/09/2020    GLUCOSEU Negative 03/09/2020    GLUCOSEU NEGATIVE 03/16/2011       Radiology:  No orders to display           Assessment/Plan:    Active Hospital Problems    Diagnosis    Unstable angina ([address] [I20.0]    Abnormal cardiovascular stress test [R94.39]    CAD in native artery [I25.10]    Hyperlipidemia [E78.5]    Essential hypertension [I10]    Chronic diastolic congestive heart failure (HCC) [I50.32]    Class 3 severe obesity with body mass index (BMI) of 45.0 to 49.9 in adult (HCC) [E66.01, Z68.42]    Tobacco abuse [Z72.0]    Depression [F32.9]    History of alcohol abuse [F10.11]    Chronic obstructive pulmonary disease (HCC) [J44.9]     PLAN:    Unstable angina pectoris  Admitted with chest pain  Severe multi vessel coronary artery diease noted at the cardiac cath performed on 9/22/2020  Coronary artery bypass graft planned for 9/25/2020. Patient agreeable. Chronic diastolic CHF  Patient is euvolemic. Continue current management including oral Lasix. COPD without exacerbation  Continue Symbicort and duo nebs every 4 hours     Obstructive sleep apnea  Continue CPAP at night     Morbid obesity  Body mass index is 48.56 kg/m². Complicating assessment and treatment, placing patient at high risk for multiple co-morbidities as well as early death and contributing to the patient's presentation. Counseled on weight loss.     Hyperlipidemia  Continue high intensity statin     Discussed with the patient and wife, questions answered. DVT Prophylaxis: Heparin drip  Diet: DIET CARDIAC;  Code Status: Full Code    PT/OT Eval Status: To be ordered postoperatively    Dispo -inpatient stay. Disposition will depend on postoperative course.     Андрей Bobby MD

## 2020-09-23 NOTE — FLOWSHEET NOTE
09/22/20 2112   Assessment   Charting Type Shift assessment   Neurological   Neuro (WDL) WDL   Level of Consciousness 0   Gardiner Coma Scale   Eye Opening 4   Best Verbal Response 5   Best Motor Response 6   Porfirio Coma Scale Score 15   NIH/MNHISS Stroke Scale   NIH/MNIHSS Stroke Scale Assessed No   HEENT   HEENT (WDL) X   Right Eye Impaired vision   Left Eye Impaired vision   Teeth Missing teeth;Dentures upper;Dentures lower   Respiratory   Respiratory (WDL) X   Respiratory Pattern Regular   Respiratory Depth Normal   Respiratory Quality/Effort Unlabored   Chest Assessment Trachea midline; Chest expansion symmetrical   L Breath Sounds Clear;Diminished   R Breath Sounds Clear;Diminished   Cardiac   Cardiac (WDL) X   Cardiac Regularity Regular   Heart Sounds S1, S2   Cardiac Rhythm NSR   Rhythm Interpretation   Pulse 80   Cardiac Monitor   Telemetry Monitor On Yes   Telemetry Audible Yes   Telemetry Alarms Set Yes   Gastrointestinal   Abdominal (WDL) WDL   RUQ Bowel Sounds Active   LUQ Bowel Sounds Active   RLQ Bowel Sounds Active   LLQ Bowel Sounds Active   Abdomen Inspection Soft;Rounded   Tenderness Nontender; No guarding; No rebound   Peripheral Vascular   Peripheral Vascular (WDL) X   Edema Right lower extremity; Left lower extremity   RLE Edema Trace   LLE Edema Trace   Puncture Site Assessment 1   Location Radial - right   Site Assessment No redness, drainage, swelling or hematoma   Hemostasis Intervention Discontinued   Dressing Applied Transparent occlusive dressing   Multiple puncture sites No   Skin Color/Condition   Skin Color/Condition (WDL) WDL   Skin Integrity   Skin Integrity (WDL) WDL   Musculoskeletal   Musculoskeletal (WDL) WDL   Genitourinary   Genitourinary (WDL) WDL   Flank Tenderness No   Suprapubic Tenderness No   Dysuria No   Urine Assessment   Incontinence No   Urine Color Yellow/straw   Urine Appearance Cloudy   Urine Odor No odor   Anus/Rectum   Anus/Rectum (WDL) WDL   Psychosocial Psychosocial (WDL) WDL   Patient Behaviors Calm; Cooperative

## 2020-09-23 NOTE — PROGRESS NOTES
CARDIOLOGY PROGRESS NOTE      Patient Name: Libia Camilo  Date of admission: 9/22/2020  3:42 PM  Admission Dx: CAD in native artery [I25.10]  Reason for Consult:  Unstable angina  Requesting Physician: Alethea Santacruz MD  Primary Care physician: ANICETO Hollis CNP    Subjective:     Libia Camilo is a 64 y.o. patient who presented to [facility] with complaints of chest pain. The patient has prior history of congestive heart failure with recent admission, obesity, prior 5 pack per day smoker for 15-20 years, COPD on nocturnal oxygen, AONH non-compliant with CPAP. He was found to have an abnormal stress. He began having rest pain in the afternoon 9/22 consistent with unstable angina. He was inititated on therapies and transferred to [facility] for [facility] which revealed multi-vessel coronary artery disease. Coronary artery bypass is tentatively planned. This morning Hui Skelton feels tired and anxious. He has no chest pain on 10mcg nitro gtt. He did not sleep well. He had a couple pauses (<3s) by telemetry and notes he's never been tested for sleep apnea but believes he has it. Denies palpitations, dizziness or near-syncope. No orthopnea/PND. No LE edema. Home Medications:  Were reviewed and are listed in nursing record and/or below  Prior to Admission medications    Medication Sig Start Date End Date Taking?  Authorizing Provider   atorvastatin (LIPITOR) 10 MG tablet Take 1 tablet by mouth daily 9/17/20  Yes ANICETO Hollis CNP   furosemide (LASIX) 20 MG tablet TAKE ONE TABLET BY MOUTH EVERY MORNING 9/9/20  Yes ANICETO Hollis CNP   venlafaxine (EFFEXOR XR) 75 MG extended release capsule TAKE ONE CAPSULE BY MOUTH DAILY 8/7/20  Yes ANICETO Hollis CNP   traZODone (DESYREL) 50 MG tablet TAKE ONE TABLET BY MOUTH ONCE NIGHTLY AS NEEDED FOR SLEEP 8/7/20  Yes ANICETO Hollis CNP   potassium chloride (KLOR-CON) 10 MEQ extended release PRN  heparin (porcine) injection 2,000 Units, PRN  heparin 25,000 unit in sodium chloride 0.45% 250 mL infusion, Continuous  nitroGLYCERIN 50 mg in dextrose 5% 250 mL infusion, Continuous  furosemide (LASIX) tablet 20 mg, Daily  budesonide-formoterol (SYMBICORT) 160-4.5 MCG/ACT inhaler 2 puff, BID  ipratropium-albuterol (DUONEB) nebulizer solution 1 ampule, Q4H PRN        Allergies:  Codeine and Dilaudid [hydromorphone hcl]     Review of Systems:   A 14 point review of symptoms completed. Pertinent positives identified in the HPI, all other review of symptoms negative. Objective:     Vitals:    09/22/20 2112 09/22/20 2328 09/23/20 0321 09/23/20 0620   BP: 122/67 107/61 111/62    Pulse: 80 82 85    Resp: 16 18 16    Temp: 98 °F (36.7 °C) 97.5 °F (36.4 °C) 97.8 °F (36.6 °C)    TempSrc: Oral Oral Oral    SpO2: 92% 90% 90%    Weight:    282 lb 14.4 oz (128.3 kg)   Height:          Weight: 282 lb 14.4 oz (128.3 kg)       PHYSICAL EXAM:    General:  Alert, cooperative, anxious appearing   Head:  Normocephalic, atraumatic   Eyes:  Conjunctiva/corneas clear, anicteric sclerae    Nose: Nares normal, no drainage or sinus tenderness   Throat: No abnormalities of the lips, oral mucosa or tongue. Neck: Trachea midline. Neck supple with no lymphadenopathy, thyroid not enlarged, symmetric, no tenderness/mass/nodules, no Jugular venous pressure elevation    Lungs:   Clear to auscultation bilaterally, no wheezes, rales at the bases. Chest Wall:  No deformity or tenderness to palpation   Heart:  Regular rate and rhythm, distant S1, normal S2, no murmur, no rub, no S3/S4, PMI non-palpable    Abdomen:   Obese, Soft, non-tender, with normoactive bowel sounds. No masses, no hepatosplenomegaly   Extremities: No cyanosis, clubbing. No pitting. Vascular: 2+ radial, brachial, femoral, dorsalis pedis and posterior tibial pulses bilaterally. Brisk carotid upstrokes without carotid bruit.     Skin: Skin color, texture, turgor are normal with no rashes or ulceration. Pysch: Euthymic mood, appropriate affect   Neurologic: Oriented to person, place and time. No slurred speech or facial asymmetry. No motor or sensory deficits on gross examination. Labs:   CBC:   Lab Results   Component Value Date    WBC 12.7 09/22/2020    RBC 5.54 09/22/2020    HGB 16.5 09/22/2020    HCT 50.2 09/22/2020    MCV 90.7 09/22/2020    RDW 15.3 09/22/2020     09/22/2020     CMP:  Lab Results   Component Value Date     09/23/2020    K 3.8 09/23/2020    K 4.5 09/19/2020     09/23/2020    CO2 27 09/23/2020    BUN 28 09/23/2020    CREATININE 0.9 09/23/2020    GFRAA >60 09/23/2020    GFRAA >60 03/16/2011    AGRATIO 1.3 09/19/2020    LABGLOM >60 09/23/2020    GLUCOSE 107 09/23/2020    PROT 6.8 09/19/2020    PROT 6.9 03/16/2011    CALCIUM 8.9 09/23/2020    BILITOT 0.7 09/19/2020    ALKPHOS 67 09/19/2020    AST 20 09/19/2020    ALT 26 09/19/2020     PT/INR:  No results found for: PTINR  HgBA1c:  Lab Results   Component Value Date    LABA1C 6.3 09/18/2020     Lab Results   Component Value Date    TROPONINI <0.01 09/18/2020         Interval Testing/Data:     Lebron Jimenez 9/22/20   Summary     Increase left ventricular systolic volume with hypokinesis/decreased     myocardial thickening of the inferolateral segment. Large area of inducible     ischemia of the LAD and left circumflex territories. Post-stress LVEF normal     at 56%.     Overall findings represent highly abnormal study, high risk scan. [facility] 9/22/20  1. Left main coronary artery has moderate calcific disease. It gave off the left anterior descending artery and left circumflex.     2. Left anterior descending artery has severe atherosclerotic disease. It was moderate in size. It gave off a large early diagonal branch that has ostial 90%. The LAD covered the entire apex of the left ventricle.      3. Left circumflex has severe atherosclerotic disease. It was moderate in size.  There was a moderate sized obtuse marginal branch that is occluded.     4. Right coronary artery has mild atherosclerotic disease. It was large in size and was the dominant artery. ~there is faint collaterals to the LAD     5. Left ventriculogram showed normal LVEF at 55-60%. Wall motion was normal . There was no significant mitral valve or aortic valve disease noted. LVEDP was normal. There was no gradient noted across the aortic valve during pullback of the catheter.     CONCLUSIONS:     1. Severe multi-vessel coronary artery disease       Impression and Plan    Mr. Darwin Rubio is a pleasant 64year old man with a prior history notable for CHF, presenting with progressive chest pain and dyspnea from home. Highly abnormal stress test 9/22. Taken for cath upon transfer to [facility] later 9/22 revealing multi-vessel CAD. Interventionalist has discussed the case with cardiothoracic surgery who will evaluate for bypass. 1. Unstable angina with Severe multi-vessel CAD by cath   2. Dyspnea on exertion  3. Congestive heart failure - euvolemic   4. Hyperlipidemia  5. Hypertension  6. Prior tobacco use, now quit  7. Obesity   8. Suspect obstructive sleep apnea     Plan:   1. Cardiothoracic surgery consultation  2. Echo 8/2020 - no need for repeat   3. Continue heparin gtt and nitroglycerin gtt. Can attempt to wean the latter. 4. Continue metoprolol at present dosing  5. Continue amlodipine at present dosing  6. Continue daily baby aspirin  7.  Continue high intensity statin          Patient Active Problem List   Diagnosis    Crush injury of right foot    Shortness of breath    Chest pain    Erectile dysfunction    Hyperglycemia    Anxiety    Depression    Tobacco abuse    History of alcohol abuse    Fatigue    OANH (obstructive sleep apnea)    Hypersomnia    Chronic obstructive pulmonary disease (HCC)    Lumbar radiculopathy    HNP (herniated nucleus pulposus), lumbar    Spondylosis without myelopathy or radiculopathy, lumbar region    DDD (degenerative disc disease), lumbar    Lumbar spine pain    PNA (pneumonia)    Pneumonia    Acute respiratory distress    Class 3 severe obesity with body mass index (BMI) of 45.0 to 49.9 in adult [facility])    Pneumonia, primary atypical    Acute respiratory failure with hypoxia (HCC)    Moderate protein-calorie malnutrition (HCC)    Acute respiratory failure (HCC)    Acute on chronic respiratory failure with hypoxemia (HCC)    Acute diastolic congestive heart failure (HCC)    Chronic diastolic congestive heart failure (HCC)    Hyperlipidemia    Essential hypertension    Insomnia    Morbid obesity with BMI of 45.0-49.9, adult ([facility] [address]    Unstable angina (HCC)    Abnormal cardiovascular stress test    CAD in native artery           I will address the patient's cardiac risk factors and adjusted pharmacologic treatment as needed. In addition, I have reinforced the need for patient directed risk factor modification. All questions and concerns were addressed to the patient/family. Alternatives to my treatment were discussed. Thank you for allowing us to participate in the care of Lynda Mckinney. Please call me with any questions [phone].     Jaqueline Mota MD   Cardiovascular Disease  [facility]  [phone] [facility]  [phone] [address] [place] [address]  9/23/2020 7:43 AM

## 2020-09-23 NOTE — CARE COORDINATION
CASE MANAGEMENT INITIAL ASSESSMENT      Reviewed chart and completed assessment with: patient   Explained Case Management role/services. Primary contact information:  [address]:   Who do you trust or have selected to make healthcare decisions for you? Name:   Asif Tavarez   Phone  Number: [phone]  Can this person be reached and be able to respond quickly, such as within a few minutes or hours? yes    Admit date/status: 9/22/20 Inpatient   Diagnosis: CAD   Is this a Readmission?: no    Insurance: [employer]   Precert required for SNF - Y        3 night stay required -  N    Living arrangements, Adls, care needs, prior to admission: lives in a house with his wife     Transportation: patient     Durable Medical Equipment at home: Walker__Cane__RTS__ BSC__Shower Chair__  02_X (bought it out of pocket)_ HHN_X_ CPAP__  BiPap__  Hospital Bed__ W/C___ Other__________    Services in the home and/or outpatient, prior to admission: lives in a house with his wife       PT/OT recs: none    Hospital Exemption Notification (HEN): not initiated     Barriers to discharge: none    Plan/comments: Patient is independent at home and owns a [employer] business. He has oxygen at home that he bought but only wears prn. Will continue to follow should any needs arise.

## 2020-09-24 ENCOUNTER — APPOINTMENT (OUTPATIENT)
Dept: VASCULAR LAB | Age: 60
DRG: 234 | End: 2020-09-24
Attending: INTERNAL MEDICINE
Payer: COMMERCIAL

## 2020-09-24 ENCOUNTER — ANESTHESIA EVENT (OUTPATIENT)
Dept: OPERATING ROOM | Age: 60
DRG: 234 | End: 2020-09-24
Payer: COMMERCIAL

## 2020-09-24 LAB
ABO/RH: NORMAL
ANION GAP SERPL CALCULATED.3IONS-SCNC: 10 MMOL/L (ref 3–16)
ANTIBODY SCREEN: NORMAL
APTT: 60.6 SEC (ref 24.2–36.2)
APTT: 61.3 SEC (ref 24.2–36.2)
BUN BLDV-MCNC: 18 MG/DL (ref 7–20)
CALCIUM SERPL-MCNC: 9.2 MG/DL (ref 8.3–10.6)
CAMPYLOBACTER JEJUNI/COLI PCR: NOT DETECTED
CAMPYLOBACTER UPSALIENSIS: NOT DETECTED
CHLORIDE BLD-SCNC: 102 MMOL/L (ref 99–110)
CO2: 25 MMOL/L (ref 21–32)
CREAT SERPL-MCNC: 0.8 MG/DL (ref 0.9–1.3)
E COLI SHIGELLA/ENTEROINVASIVE PCR: NOT DETECTED
GFR AFRICAN AMERICAN: >60
GFR NON-AFRICAN AMERICAN: >60
GLUCOSE BLD-MCNC: 114 MG/DL (ref 70–99)
HCT VFR BLD CALC: 49.5 % (ref 40.5–52.5)
HEMOGLOBIN: 16.6 G/DL (ref 13.5–17.5)
MCH RBC QN AUTO: 29.7 PG (ref 26–34)
MCHC RBC AUTO-ENTMCNC: 33.5 G/DL (ref 31–36)
MCV RBC AUTO: 88.6 FL (ref 80–100)
PDW BLD-RTO: 15 % (ref 12.4–15.4)
PLATELET # BLD: 169 K/UL (ref 135–450)
PMV BLD AUTO: 8.5 FL (ref 5–10.5)
POTASSIUM SERPL-SCNC: 4.1 MMOL/L (ref 3.5–5.1)
RBC # BLD: 5.58 M/UL (ref 4.2–5.9)
SALMONELLA PCR: NOT DETECTED
SHIGA TOXIN I: NOT DETECTED
SHIGA TOXIN II: NOT DETECTED
SODIUM BLD-SCNC: 137 MMOL/L (ref 136–145)
WBC # BLD: 10.4 K/UL (ref 4–11)

## 2020-09-24 PROCEDURE — 6370000000 HC RX 637 (ALT 250 FOR IP): Performed by: NURSE PRACTITIONER

## 2020-09-24 PROCEDURE — 85730 THROMBOPLASTIN TIME PARTIAL: CPT

## 2020-09-24 PROCEDURE — 6370000000 HC RX 637 (ALT 250 FOR IP): Performed by: INTERNAL MEDICINE

## 2020-09-24 PROCEDURE — 99233 SBSQ HOSP IP/OBS HIGH 50: CPT | Performed by: INTERNAL MEDICINE

## 2020-09-24 PROCEDURE — 93880 EXTRACRANIAL BILAT STUDY: CPT

## 2020-09-24 PROCEDURE — 86900 BLOOD TYPING SEROLOGIC ABO: CPT

## 2020-09-24 PROCEDURE — 80048 BASIC METABOLIC PNL TOTAL CA: CPT

## 2020-09-24 PROCEDURE — 94760 N-INVAS EAR/PLS OXIMETRY 1: CPT

## 2020-09-24 PROCEDURE — 86901 BLOOD TYPING SEROLOGIC RH(D): CPT

## 2020-09-24 PROCEDURE — 86923 COMPATIBILITY TEST ELECTRIC: CPT

## 2020-09-24 PROCEDURE — 94729 DIFFUSING CAPACITY: CPT

## 2020-09-24 PROCEDURE — 36415 COLL VENOUS BLD VENIPUNCTURE: CPT

## 2020-09-24 PROCEDURE — 94640 AIRWAY INHALATION TREATMENT: CPT

## 2020-09-24 PROCEDURE — 2060000000 HC ICU INTERMEDIATE R&B

## 2020-09-24 PROCEDURE — 86850 RBC ANTIBODY SCREEN: CPT

## 2020-09-24 PROCEDURE — 94060 EVALUATION OF WHEEZING: CPT

## 2020-09-24 PROCEDURE — 6370000000 HC RX 637 (ALT 250 FOR IP): Performed by: THORACIC SURGERY (CARDIOTHORACIC VASCULAR SURGERY)

## 2020-09-24 PROCEDURE — 94726 PLETHYSMOGRAPHY LUNG VOLUMES: CPT

## 2020-09-24 PROCEDURE — 85027 COMPLETE CBC AUTOMATED: CPT

## 2020-09-24 PROCEDURE — 93971 EXTREMITY STUDY: CPT

## 2020-09-24 RX ORDER — CHLORHEXIDINE GLUCONATE 0.12 MG/ML
15 RINSE ORAL ONCE
Status: DISCONTINUED | OUTPATIENT
Start: 2020-09-24 | End: 2020-09-24 | Stop reason: SDUPTHER

## 2020-09-24 RX ORDER — CHLORHEXIDINE GLUCONATE 4 G/100ML
SOLUTION TOPICAL SEE ADMIN INSTRUCTIONS
Status: DISCONTINUED | OUTPATIENT
Start: 2020-09-24 | End: 2020-09-25

## 2020-09-24 RX ORDER — SODIUM CHLORIDE 9 MG/ML
INJECTION, SOLUTION INTRAVENOUS CONTINUOUS
Status: DISCONTINUED | OUTPATIENT
Start: 2020-09-25 | End: 2020-09-25

## 2020-09-24 RX ORDER — CHLORHEXIDINE GLUCONATE 0.12 MG/ML
15 RINSE ORAL ONCE
Status: COMPLETED | OUTPATIENT
Start: 2020-09-25 | End: 2020-09-25

## 2020-09-24 RX ORDER — MIDAZOLAM HYDROCHLORIDE 1 MG/ML
5 INJECTION INTRAMUSCULAR; INTRAVENOUS ONCE
Status: COMPLETED | OUTPATIENT
Start: 2020-09-25 | End: 2020-09-25

## 2020-09-24 RX ORDER — ALBUTEROL SULFATE 90 UG/1
4 AEROSOL, METERED RESPIRATORY (INHALATION) ONCE
Status: COMPLETED | OUTPATIENT
Start: 2020-09-24 | End: 2020-09-24

## 2020-09-24 RX ORDER — BISACODYL 10 MG
10 SUPPOSITORY, RECTAL RECTAL ONCE
Status: DISCONTINUED | OUTPATIENT
Start: 2020-09-24 | End: 2020-09-25

## 2020-09-24 RX ORDER — PANTOPRAZOLE SODIUM 40 MG/1
40 TABLET, DELAYED RELEASE ORAL
Status: DISCONTINUED | OUTPATIENT
Start: 2020-09-24 | End: 2020-09-25

## 2020-09-24 RX ORDER — ASPIRIN 81 MG/1
81 TABLET ORAL ONCE
Status: COMPLETED | OUTPATIENT
Start: 2020-09-25 | End: 2020-09-25

## 2020-09-24 RX ADMIN — Medication 2 PUFF: at 08:31

## 2020-09-24 RX ADMIN — AMLODIPINE BESYLATE 5 MG: 5 TABLET ORAL at 08:18

## 2020-09-24 RX ADMIN — POTASSIUM CHLORIDE 10 MEQ: 750 TABLET, EXTENDED RELEASE ORAL at 08:18

## 2020-09-24 RX ADMIN — VENLAFAXINE HYDROCHLORIDE 75 MG: 37.5 CAPSULE, EXTENDED RELEASE ORAL at 08:18

## 2020-09-24 RX ADMIN — Medication 2 PUFF: at 19:57

## 2020-09-24 RX ADMIN — ANTISEPTIC SURGICAL SCRUB: 0.04 SOLUTION TOPICAL at 21:58

## 2020-09-24 RX ADMIN — FUROSEMIDE 20 MG: 20 TABLET ORAL at 08:18

## 2020-09-24 RX ADMIN — METOPROLOL TARTRATE 25 MG: 25 TABLET, FILM COATED ORAL at 21:58

## 2020-09-24 RX ADMIN — METOPROLOL TARTRATE 25 MG: 25 TABLET, FILM COATED ORAL at 08:18

## 2020-09-24 RX ADMIN — TRAZODONE HYDROCHLORIDE 50 MG: 50 TABLET ORAL at 22:38

## 2020-09-24 RX ADMIN — PANTOPRAZOLE SODIUM 40 MG: 40 TABLET, DELAYED RELEASE ORAL at 08:18

## 2020-09-24 RX ADMIN — ASPIRIN 81 MG: 81 TABLET, CHEWABLE ORAL at 08:18

## 2020-09-24 RX ADMIN — MUPIROCIN: 20 OINTMENT TOPICAL at 21:58

## 2020-09-24 RX ADMIN — Medication 4 PUFF: at 11:32

## 2020-09-24 RX ADMIN — MELATONIN TAB 5 MG 5 MG: 5 TAB at 22:38

## 2020-09-24 RX ADMIN — ATORVASTATIN CALCIUM 80 MG: 80 TABLET, FILM COATED ORAL at 21:58

## 2020-09-24 ASSESSMENT — COPD QUESTIONNAIRES: CAT_SEVERITY: MODERATE

## 2020-09-24 ASSESSMENT — LIFESTYLE VARIABLES: SMOKING_STATUS: 1

## 2020-09-24 ASSESSMENT — ENCOUNTER SYMPTOMS: SHORTNESS OF BREATH: 1

## 2020-09-24 NOTE — PROGRESS NOTES
CARDIOLOGY PROGRESS NOTE      Patient Name: Kp Mckee  Date of admission: 9/22/2020  3:42 PM  Admission Dx: CAD in native artery [I25.10]  Reason for Consult:  Unstable angina  Requesting Physician: Jerone Cooks, MD  Primary Care physician: ANICETO Driver CNP    Subjective:     Kp Mckee is a 64 y.o. patient who presented to [facility] with complaints of chest pain. The patient has prior history of congestive heart failure with recent admission, obesity, prior 5 pack per day smoker for 15-20 years, COPD on nocturnal oxygen, OANH non-compliant with CPAP. He was found to have an abnormal stress. He began having rest pain in the afternoon 9/22 consistent with unstable angina. He was inititated on therapies and transferred to [facility] for [address] [place] [address] which revealed multi-vessel coronary artery disease. Coronary artery bypass is tentatively planned. Remains on 5mcg nitro gtt. He has no chest pain. Denies palpitations, dizziness or near-syncope. He complains of mild shortness of breath at night improved with oxygen. No orthopnea/PND. No LE edema. Home Medications:  Were reviewed and are listed in nursing record and/or below  Prior to Admission medications    Medication Sig Start Date End Date Taking?  Authorizing Provider   atorvastatin (LIPITOR) 10 MG tablet Take 1 tablet by mouth daily 9/17/20  Yes ANICETO Driver CNP   furosemide (LASIX) 20 MG tablet TAKE ONE TABLET BY MOUTH EVERY MORNING 9/9/20  Yes ANICETO Driver CNP   venlafaxine (EFFEXOR XR) 75 MG extended release capsule TAKE ONE CAPSULE BY MOUTH DAILY 8/7/20  Yes ANICETO Driver CNP   traZODone (DESYREL) 50 MG tablet TAKE ONE TABLET BY MOUTH ONCE NIGHTLY AS NEEDED FOR SLEEP 8/7/20  Yes ANICETO Driver CNP   potassium chloride (KLOR-CON) 10 MEQ extended release tablet TAKE ONE TABLET BY MOUTH DAILY 7/14/20  Yes ANICETO Hatfield CNP   albuterol (PROVENTIL) (2.5 MG/3ML) 0.083% nebulizer solution Take 3 mLs by nebulization every 6 hours as needed for Wheezing or Shortness of Breath 9/25/19  Yes ANICETO Mtz CNP   ipratropium (ATROVENT) 0.02 % nebulizer solution Take 2.5 mLs by nebulization 4 times daily as needed for Wheezing (short of breath) 9/25/19  Yes ANICETO Mtz CNP   albuterol sulfate HFA (PROVENTIL HFA) 108 (90 Base) MCG/ACT inhaler Inhale 2 puffs into the lungs every 6 hours as needed for Wheezing 5/17/19  Yes Leelee Dominique MD        CURRENT Medications:  melatonin tablet 5 mg, Nightly PRN  sodium chloride flush 0.9 % injection 10 mL, 2 times per day  sodium chloride flush 0.9 % injection 10 mL, PRN  potassium chloride (KLOR-CON M) extended release tablet 40 mEq, PRN    Or  potassium bicarb-citric acid (EFFER-K) effervescent tablet 40 mEq, PRN    Or  potassium chloride 10 mEq/100 mL IVPB (Peripheral Line), PRN  acetaminophen (TYLENOL) tablet 650 mg, Q6H PRN    Or  acetaminophen (TYLENOL) suppository 650 mg, Q6H PRN  polyethylene glycol (GLYCOLAX) packet 17 g, Daily PRN  promethazine (PHENERGAN) tablet 12.5 mg, Q6H PRN    Or  ondansetron (ZOFRAN) injection 4 mg, Q6H PRN  sodium chloride flush 0.9 % injection 10 mL, 2 times per day  sodium chloride flush 0.9 % injection 10 mL, PRN  albuterol sulfate  (90 Base) MCG/ACT inhaler 2 puff, Q6H PRN  amLODIPine (NORVASC) tablet 5 mg, Daily  aspirin chewable tablet 81 mg, Daily  atorvastatin (LIPITOR) tablet 80 mg, Nightly  metoprolol tartrate (LOPRESSOR) tablet 25 mg, BID  pantoprazole (PROTONIX) injection 40 mg, Daily  potassium chloride (KLOR-CON M) extended release tablet 10 mEq, Daily  traZODone (DESYREL) tablet 50 mg, Nightly PRN  venlafaxine (EFFEXOR XR) extended release capsule 75 mg, Daily with breakfast  hydrALAZINE (APRESOLINE) injection 10 mg, Q10 Min PRN  heparin (porcine) injection 4,000 Units, PRN  heparin (porcine) injection 2,000 Units, PRN  heparin mood, appropriate affect   Neurologic: Oriented to person, place and time. No slurred speech or facial asymmetry. No motor or sensory deficits on gross examination. Labs:   CBC:   Lab Results   Component Value Date    WBC 10.4 09/24/2020    RBC 5.58 09/24/2020    HGB 16.6 09/24/2020    HCT 49.5 09/24/2020    MCV 88.6 09/24/2020    RDW 15.0 09/24/2020     09/24/2020     CMP:  Lab Results   Component Value Date     09/23/2020    K 3.8 09/23/2020    K 4.5 09/19/2020     09/23/2020    CO2 27 09/23/2020    BUN 28 09/23/2020    CREATININE 0.9 09/23/2020    GFRAA >60 09/23/2020    GFRAA >60 03/16/2011    AGRATIO 1.3 09/19/2020    LABGLOM >60 09/23/2020    GLUCOSE 107 09/23/2020    PROT 6.8 09/19/2020    PROT 6.9 03/16/2011    CALCIUM 8.9 09/23/2020    BILITOT 0.7 09/19/2020    ALKPHOS 67 09/19/2020    AST 20 09/19/2020    ALT 26 09/19/2020     PT/INR:  No results found for: PTINR  HgBA1c:  Lab Results   Component Value Date    LABA1C 6.3 09/18/2020     Lab Results   Component Value Date    TROPONINI <0.01 09/18/2020         Interval Testing/Data:     Orly Neither 9/22/20   Summary     Increase left ventricular systolic volume with hypokinesis/decreased     myocardial thickening of the inferolateral segment. Large area of inducible     ischemia of the LAD and left circumflex territories. Post-stress LVEF normal     at 56%.     Overall findings represent highly abnormal study, high risk scan. [facility] 9/22/20  1. Left main coronary artery has moderate calcific disease. It gave off the left anterior descending artery and left circumflex.     2. Left anterior descending artery has severe atherosclerotic disease. It was moderate in size. It gave off a large early diagonal branch that has ostial 90%. The LAD covered the entire apex of the left ventricle.      3. Left circumflex has severe atherosclerotic disease. It was moderate in size.  There was a moderate sized obtuse marginal branch that is occluded.     4. Right coronary artery has mild atherosclerotic disease. It was large in size and was the dominant artery. ~there is faint collaterals to the LAD     5. Left ventriculogram showed normal LVEF at 55-60%. Wall motion was normal . There was no significant mitral valve or aortic valve disease noted. LVEDP was normal. There was no gradient noted across the aortic valve during pullback of the catheter.     CONCLUSIONS:     1. Severe multi-vessel coronary artery disease       Impression and Plan    Mr. Kp Mckee is a pleasant 64year old man with a prior history notable for CHF, presenting with progressive chest pain and dyspnea from home. Highly abnormal stress test 9/22. Taken for cath upon transfer to [facility] 9/22 revealing multi-vessel CAD. Interventionalist has discussed the case with cardiothoracic surgery who will evaluate for bypass. 1. Unstable angina with Severe multi-vessel CAD by cath   2. Dyspnea on exertion  3. Congestive heart failure - euvolemic   4. Hyperlipidemia  5. Hypertension  6. Prior tobacco use, now quit  7. Obesity   8. Suspect obstructive sleep apnea - using oxygen at night while in house. Plan:   1. Cardiothoracic surgery consultation  2. Echo 8/2020 - no need for repeat   3. Continue heparin gtt. 4. Wean nitro gtt to off and monitor symptoms  5. Continue metoprolol at present dosing  6. Continue amlodipine at present dosing  7. Continue daily baby aspirin  8. Continue high intensity statin  9. Sleep medicine evaluation on discharge.            Patient Active Problem List   Diagnosis    Crush injury of right foot    Shortness of breath    Chest pain    Erectile dysfunction    Hyperglycemia    Anxiety    Depression    Tobacco abuse    History of alcohol abuse    Fatigue    OANH (obstructive sleep apnea)    Hypersomnia    Chronic obstructive pulmonary disease (HCC)    Lumbar radiculopathy    HNP (herniated nucleus pulposus), lumbar    Spondylosis without myelopathy or radiculopathy, lumbar region    DDD (degenerative disc disease), lumbar    Lumbar spine pain    PNA (pneumonia)    Pneumonia    Acute respiratory distress    Class 3 severe obesity with body mass index (BMI) of 45.0 to 49.9 in adult [facility])    Pneumonia, primary atypical    Acute respiratory failure with hypoxia (HCC)    Moderate protein-calorie malnutrition (HCC)    Acute respiratory failure (HCC)    Acute on chronic respiratory failure with hypoxemia (HCC)    Acute diastolic congestive heart failure (HCC)    Chronic diastolic congestive heart failure (HCC)    Hyperlipidemia    Essential hypertension    Insomnia    Morbid obesity with BMI of 45.0-49.9, adult (HCC)    Unstable angina (HCC)    Abnormal cardiovascular stress test    CAD in native artery           I will address the patient's cardiac risk factors and adjusted pharmacologic treatment as needed. In addition, I have reinforced the need for patient directed risk factor modification. All questions and concerns were addressed to the patient/family. Alternatives to my treatment were discussed. Thank you for allowing us to participate in the care of Alfreda Wood. Please call me with any questions [phone].     Mihir Sesay MD   Cardiovascular Disease  [address]  [phone] [facility]  [phone] [address] [place] [address]  9/24/2020 7:56 AM

## 2020-09-24 NOTE — ANESTHESIA PRE PROCEDURE
Department of Anesthesiology  Preprocedure Note       Name:  Giselle Odom   Age:  64 y.o.  :  1960                                          MRN:  [id number]         Date:  2020      Surgeon: Bobbi Degree):  Ana Leach MD    Procedure: Procedure(s):  CORONARY ARTERY BYPASS GRAFTING X3, INTERNAL MAMMARY ARTERY, SAPHENOUS VEIN GRAFT, ON PUMP    Medications prior to admission:   Prior to Admission medications    Medication Sig Start Date End Date Taking?  Authorizing Provider   atorvastatin (LIPITOR) 10 MG tablet Take 1 tablet by mouth daily 20  Yes ANICETO Hammonds CNP   furosemide (LASIX) 20 MG tablet TAKE ONE TABLET BY MOUTH EVERY MORNING 20  Yes ANICETO Hammonds CNP   venlafaxine (EFFEXOR XR) 75 MG extended release capsule TAKE ONE CAPSULE BY MOUTH DAILY 20  Yes ANICETO Hammonds CNP   traZODone (DESYREL) 50 MG tablet TAKE ONE TABLET BY MOUTH ONCE NIGHTLY AS NEEDED FOR SLEEP 20  Yes ANICETO Hammonds CNP   potassium chloride (KLOR-CON) 10 MEQ extended release tablet TAKE ONE TABLET BY MOUTH DAILY 20  Yes Vara NoseANICETO CNP   albuterol (PROVENTIL) (2.5 MG/3ML) 0.083% nebulizer solution Take 3 mLs by nebulization every 6 hours as needed for Wheezing or Shortness of Breath 19  Yes ANICETO Hammonds CNP   ipratropium (ATROVENT) 0.02 % nebulizer solution Take 2.5 mLs by nebulization 4 times daily as needed for Wheezing (short of breath) 19  Yes ANICETO Hammonds CNP   albuterol sulfate HFA (PROVENTIL HFA) 108 (90 Base) MCG/ACT inhaler Inhale 2 puffs into the lungs every 6 hours as needed for Wheezing 19  Yes Loretta Pena MD       Current medications:    Current Facility-Administered Medications   Medication Dose Route Frequency Provider Last Rate Last Dose    pantoprazole (PROTONIX) tablet 40 mg  40 mg Oral QAM AC Radha Collier MD   40 mg at 20 0818    bisacodyl (DULCOLAX) suppository 10 mg  10 mg Rectal Once Julius JerryANICETO shelton CNP        [START ON 9/25/2020] insulin regular (HUMULIN R;NOVOLIN R) 100 Units in sodium chloride 0.9 % 100 mL infusion  0.5 Units/hr Intravenous On Call Julius JerryANICETO shelton CNP        [START ON 9/25/2020] aspirin EC tablet 81 mg  81 mg Oral Once Julius Nez PerceANICETO shelton CNP        chlorhexidine (HIBICLENS) 4 % liquid   Topical See Admin Instructions Julius Nez PerceANICETO shelton CNP        [START ON 9/25/2020] 0.9 % sodium chloride infusion   [facility]        [START ON 9/25/2020] ceFAZolin (ANCEF) 3 g in dextrose 5 % 100 mL IVPB  3 g Intravenous On Call to . [address], ANICETO Whitley CNP        [START ON 9/25/2020] vancomycin (VANCOCIN) 2,000 mg in dextrose 5 % 500 mL IVPB  2,000 mg Intravenous On Call to . [address], ANICETO Whitley CNP        [START ON 9/25/2020] metoprolol tartrate (LOPRESSOR) tablet 12.5 mg  12.5 mg Oral Once [facility]        [START ON 9/25/2020] chlorhexidine (PERIDEX) 0.12 % solution 15 mL  15 mL Mouth/Throat Once [facility]        mupirocin (BACTROBAN) 2 % ointment   Nasal BID Julius Nez PerceANICETO shelton CNP        melatonin tablet 5 mg  5 mg Oral Nightly PRN ANICETO Vidales CNP   5 mg at 09/23/20 2123    sodium chloride flush 0.9 % injection 10 mL  10 mL Intravenous 2 times per day Alanis Melo MD   10 mL at 09/22/20 2114    sodium chloride flush 0.9 % injection 10 mL  10 mL Intravenous PRN Alanis Melo MD        potassium chloride (KLOR-CON M) extended release tablet 40 mEq  40 mEq Oral PRN Alanis Melo MD        Or    potassium bicarb-citric acid (EFFER-K) effervescent tablet 40 mEq  40 mEq Oral PRN Alanis Melo MD        Or    potassium chloride 10 mEq/100 mL IVPB (Peripheral Line)  10 mEq Intravenous PRN Alanis Melo MD        acetaminophen (TYLENOL) tablet 650 mg  650 mg Oral Q6H PRN Alanis Melo MD   650 mg at 09/23/20 6341 Or    acetaminophen (TYLENOL) suppository 650 mg  650 mg Rectal Q6H PRN Jam Clayton MD        polyethylene glycol (GLYCOLAX) packet 17 g  17 g Oral Daily PRN Jam Clayton MD        promethazine (PHENERGAN) tablet 12.5 mg  12.5 mg Oral Q6H PRN Jam Clayton MD        Or    ondansetron [facility]) injection 4 mg  4 mg Intravenous Q6H PRN Jam Clayton MD        sodium chloride flush 0.9 % injection 10 mL  10 mL Intravenous 2 times per day Jam Clayton MD   10 mL at 09/23/20 0858    sodium chloride flush 0.9 % injection 10 mL  10 mL Intravenous PRN Jam Clayton MD        albuterol sulfate  (90 Base) MCG/ACT inhaler 2 puff  2 puff Inhalation Q6H PRN Jam Clayton MD        amLODIPine (NORVASC) tablet 5 mg  5 mg Oral Daily Jam Clayton MD   5 mg at 09/24/20 0818    aspirin chewable tablet 81 mg  81 mg Oral Daily Jam Clayton MD   81 mg at 09/24/20 0818    atorvastatin (LIPITOR) tablet 80 mg  80 mg Oral Nightly Jam Clayton MD   80 mg at 09/23/20 2114    metoprolol tartrate (LOPRESSOR) tablet 25 mg  25 mg Oral BID Jam Clayton MD   25 mg at 09/24/20 0818    potassium chloride (KLOR-CON M) extended release tablet 10 mEq  10 mEq Oral Daily Jam Clayton MD   10 mEq at 09/24/20 0818    traZODone (DESYREL) tablet 50 mg  50 mg Oral Nightly PRN Jam Clayton MD   50 mg at 09/22/20 2115    venlafaxine (EFFEXOR XR) extended release capsule 75 mg  75 mg Oral Daily with breakfast Jam Clayton MD   75 mg at 09/24/20 0818    hydrALAZINE (APRESOLINE) injection 10 mg  10 mg Intravenous Q10 Min PRN Jam Clayton MD        heparin (porcine) injection 4,000 Units  4,000 Units Intravenous PRN Jam Clayton MD        heparin (porcine) injection 2,000 Units  2,000 Units Intravenous PRN Jam Clayton MD        heparin 25,000 unit in sodium chloride 0.45% 250 mL infusion  15.4 mL/hr Intravenous Continuous Jam Clayton MD 15.4 mL/hr at 09/23/20 1852 15.4 mL/hr at 09/23/20 1852    furosemide (LASIX) tablet 20 mg  20 mg Oral Daily Adiel Lucio MD   20 mg at 09/24/20 0818    budesonide-formoterol (SYMBICORT) 160-4.5 MCG/ACT inhaler 2 puff  2 puff Inhalation BID Adiel Lucio MD   2 puff at 09/24/20 0831    ipratropium-albuterol (DUONEB) nebulizer solution 1 ampule  1 ampule Inhalation Q4H PRN Adiel Lucio MD           Allergies:     Allergies   Allergen Reactions    Codeine Itching    Dilaudid [Hydromorphone Hcl]        Problem List:    Patient Active Problem List   Diagnosis Code    Crush injury of right foot S97.81XA    Shortness of breath R06.02    Chest pain R07.9    Erectile dysfunction N52.9    Hyperglycemia R73.9    Anxiety F41.9    Depression F32.9    Tobacco abuse Z72.0    History of alcohol abuse F10.11    Fatigue R53.83    OANH (obstructive sleep apnea) G47.33    Hypersomnia G47.10    Chronic obstructive pulmonary disease (HCC) J44.9    Lumbar radiculopathy M54.16    HNP (herniated nucleus pulposus), lumbar M51.26    Spondylosis without myelopathy or radiculopathy, lumbar region M47.816    DDD (degenerative disc disease), lumbar M51.36    Lumbar spine pain M54.5    PNA (pneumonia) J18.9    Pneumonia J18.9    Acute respiratory distress R06.03    Class 3 severe obesity with body mass index (BMI) of 45.0 to 49.9 in adult ([facility]) E66.01, Z68.42    Pneumonia, primary atypical J18.9    Acute respiratory failure with hypoxia ([facility]) J96.01    Moderate protein-calorie malnutrition (HCC) E44.0    Acute respiratory failure (HCC) J96.00    Acute on chronic respiratory failure with hypoxemia ([facility]) J96.21    Acute diastolic congestive heart failure (HCC) I50.31    Chronic diastolic congestive heart failure (HCC) I50.32    Hyperlipidemia E78.5    Essential hypertension I10    Insomnia G47.00    Morbid obesity with BMI of 45.0-49.9, adult ([facility]) E66.01, Z68.42    Unstable angina ([facility]) I20.0    Abnormal cardiovascular stress test R94.39    CAD in native artery I25.10       Past Medical History:        Diagnosis Date    Anxiety 1/6/2020    Aortic valve calcification 09/2020    seen on lung CT    Chronic obstructive pulmonary disease ([address] 9/3/2019    PFT done 9/03/19    Coronary artery calcification 09/2020    seen on lung ct    Crush injury of right foot 7/23/2015    DDD (degenerative disc disease), lumbar 1/6/2020    Erectile dysfunction 1/6/2020    Fatigue 1/6/2020    History of alcohol abuse 1/6/2020    History of drug use     HNP (herniated nucleus pulposus), lumbar 1/6/2020    Hyperglycemia 1/6/2020    Hypersomnia 1/6/2020    Kidney stone     Lumbar radiculopathy 1/6/2020    Lumbar spine pain 1/6/2020    LVH (left ventricular hypertrophy)     seen on echo 8/2020, moderate    Observed sleep apnea 1/6/2020    Reactive depression 1/6/2020    Spondylosis without myelopathy or radiculopathy, lumbar region 1/6/2020    Tobacco use 1/6/2020       Past Surgical History:        Procedure Laterality Date    CHOLECYSTECTOMY      CYSTOSCOPY  03/16/2011    cystoscopy left ureteroscopy, left retrograde, double J stent placement    PAIN MANAGEMENT PROCEDURE Right 12/24/2019    RIGHT LUMBAR FIVE SACRAL ONE TRANSFORAMINAL EPIDURAL STEROID INJECTION SITE CONFIRMED BY FLUOROSCOPY performed by Gloria Snyder MD at [address] [place] [address] Right 1/7/2020    RIGHT LUMBAR FOUR AND LUMBAR FIVE TRANSFORAMINAL EPIDURAL STEROID INJECTION SITE CONFIRMED BY FLUOROSCOPY performed by Gloria Snyder MD at [place] [address]       Social History:    Social History     Tobacco Use    Smoking status: Former Smoker     Packs/day: 2.00     Years: 35.00     Pack years: 70.00     Start date: 1974    Smokeless tobacco: Never Used   Substance Use Topics    Alcohol use:  No                                Counseling given: Not Answered      Vital Signs (Current):   Vitals:    09/24/20 1180 09/24/20 0815 09/24/20 0831 09/24/20 1221   BP: 112/73 114/66  131/66   Pulse: 75 70  69   Resp: 18 18  18   Temp: 97.7 °F (36.5 °C) 97.9 °F (36.6 °C)  98.1 °F (36.7 °C)   TempSrc: Oral Oral  Oral   SpO2: 91% 96% 95% 94%   Weight: 281 lb 1.6 oz (127.5 kg)      Height:                                                  BP Readings from Last 3 Encounters:   09/24/20 131/66   09/22/20 127/85   08/28/20 122/80       NPO Status:                                                                                 BMI:   Wt Readings from Last 3 Encounters:   09/24/20 281 lb 1.6 oz (127.5 kg)   09/22/20 287 lb 11.2 oz (130.5 kg)   08/28/20 286 lb (129.7 kg)     Body mass index is 48.25 kg/m². CBC:   Lab Results   Component Value Date    WBC 10.4 09/24/2020    RBC 5.58 09/24/2020    HGB 16.6 09/24/2020    HCT 49.5 09/24/2020    MCV 88.6 09/24/2020    RDW 15.0 09/24/2020     09/24/2020       CMP:   Lab Results   Component Value Date     09/24/2020    K 4.1 09/24/2020    K 4.5 09/19/2020     09/24/2020    CO2 25 09/24/2020    BUN 18 09/24/2020    CREATININE 0.8 09/24/2020    GFRAA >60 09/24/2020    GFRAA >60 03/16/2011    AGRATIO 1.3 09/19/2020    LABGLOM >60 09/24/2020    GLUCOSE 114 09/24/2020    PROT 6.8 09/19/2020    PROT 6.9 03/16/2011    CALCIUM 9.2 09/24/2020    BILITOT 0.7 09/19/2020    ALKPHOS 67 09/19/2020    AST 20 09/19/2020    ALT 26 09/19/2020       POC Tests: No results for input(s): POCGLU, POCNA, POCK, POCCL, POCBUN, POCHEMO, POCHCT in the last 72 hours.     Coags:   Lab Results   Component Value Date    PROTIME 12.4 08/19/2020    INR 1.07 08/19/2020    APTT 61.3 09/24/2020       HCG (If Applicable): No results found for: PREGTESTUR, PREGSERUM, HCG, HCGQUANT     ABGs:   Lab Results   Component Value Date    PHART 7.451 09/18/2020    PO2ART 64.1 09/18/2020    TLQ7YTH 42.2 09/18/2020    ASK5VRN 28.7 09/18/2020    BEART 4.2 09/18/2020    V0UEVGOQ 93.3 09/18/2020        Type & Screen (If Applicable):  No results found for: LABABO, LABRH    Drug/Infectious Status (If Applicable):  No results found for: HIV, HEPCAB    COVID-19 Screening (If Applicable):   Lab Results   Component Value Date    COVID19 Not Detected 09/19/2020    COVID19 NOT DETECTED 09/19/2020         Anesthesia Evaluation    Airway: Mallampati: III  TM distance: >3 FB   Neck ROM: full  Mouth opening: > = 3 FB Dental:    (+) edentulous      Pulmonary:   (+) pneumonia: resolved,  COPD: moderate,  shortness of breath:  sleep apnea:  current smoker                           Cardiovascular:    (+) hypertension:, angina:, CAD: obstructive, CHF: diastolic,                   Neuro/Psych:   (+) neuromuscular disease:, psychiatric history:            GI/Hepatic/Renal:   (+) morbid obesity          Endo/Other:                     Abdominal:           Vascular:                                      Anesthesia Plan      general     ASA 4     (  [facility] EVALUATION FORM       Name:  Khurram Marley                                         Age:  64 y.o. MRN:  [id number]           I discussed intravenous with inhalational endotracheal anesthesia with pulmonary artery catheter, radial ( or other artery if required) catheter, possible transesophageal echocardiography and post-operative ventilation including risks and alternatives with the patient . The patient has no further questions. Iris Milian MD  September 24, 2020  2:00 PM)  Induction: inhalational and intravenous. arterial line, BIS, central line, PA catheter and EBONY    Anesthetic plan and risks discussed with patient.                       Iris Milian MD   9/24/2020

## 2020-09-24 NOTE — PROGRESS NOTES
Patient performed Full PFT in Pulmonary Function Lab. Tolerated well. Copy of test placed in patient chart.

## 2020-09-24 NOTE — PROGRESS NOTES
Hospitalist Progress Note      PCP: ANICETO Richmond - CNP    Date of Admission: 9/22/2020    Chief Complaint: Chest pain    Hospital Course: Admitted to [facility] for chest pain. Transferred to [facility] after an abnormal cardiac stress test.  Had left heart catheterization. Coronary artery bypass graft recommended. Cardiothoracic surgery consulted and has evaluated the patient. Surgery planned for tomorrow. No new issues overnight. Nitroglycerin drip is no longer needed. Patient is on heparin drip. Subjective: No current chest pain or shortness of breath. No nausea or vomiting. On heparin drip only.     Medications:  Reviewed    Infusion Medications    heparin (Porcine) 15.4 mL/hr (09/23/20 1852)     Scheduled Medications    pantoprazole  40 mg Oral QAM AC    sodium chloride flush  10 mL Intravenous 2 times per day    sodium chloride flush  10 mL Intravenous 2 times per day    amLODIPine  5 mg Oral Daily    aspirin  81 mg Oral Daily    atorvastatin  80 mg Oral Nightly    metoprolol tartrate  25 mg Oral BID    potassium chloride  10 mEq Oral Daily    venlafaxine  75 mg Oral Daily with breakfast    furosemide  20 mg Oral Daily    budesonide-formoterol  2 puff Inhalation BID     PRN Meds: melatonin, sodium chloride flush, potassium chloride **OR** potassium alternative oral replacement **OR** potassium chloride, acetaminophen **OR** acetaminophen, polyethylene glycol, promethazine **OR** ondansetron, sodium chloride flush, albuterol sulfate HFA, traZODone, hydrALAZINE, heparin (porcine), heparin (porcine), ipratropium-albuterol      Intake/Output Summary (Last 24 hours) at 9/24/2020 1209  Last data filed at 9/24/2020 0835  Gross per 24 hour   Intake 821.23 ml   Output 1350 ml   Net -528.77 ml       Physical Exam Performed:    /66   Pulse 70   Temp 97.9 °F (36.6 °C) (Oral)   Resp 18   Ht 5' 4\" (1.626 m)   Wt 281 lb 1.6 oz (127.5 kg)   SpO2 95%   BMI 48.25 kg/m²     General appearance: No apparent distress, appears stated age and cooperative. HEENT: Pupils equal, round, and reactive to light. Conjunctivae/corneas clear. Neck: Supple, with full range of motion. No jugular venous distention. Trachea midline. Respiratory:  Normal respiratory effort. Clear to auscultation, bilaterally without Rales/Wheezes/Rhonchi. Cardiovascular: Regular rate and rhythm with normal S1/S2 without murmurs, rubs or gallops. Abdomen: Soft, non-tender, non-distended with normal bowel sounds. Musculoskeletal: No clubbing, cyanosis or edema bilaterally. Full range of motion without deformity. Skin: Skin color, texture, turgor normal.  No rashes or lesions. Neurologic:  Neurovascularly intact without any focal sensory/motor deficits. Cranial nerves: II-XII intact, grossly non-focal.  Psychiatric: Alert and oriented, thought content appropriate, normal insight  Capillary Refill: Brisk,< 3 seconds   Peripheral Pulses: +2 palpable, equal bilaterally     I examined the patient today (09/24/20). Physical exam is similar to yesterday (9/23). Labs:   Recent Labs     09/22/20  1253 09/22/20  2047 09/24/20  0641   WBC 14.6* 12.7* 10.4   HGB 18.1* 16.5 16.6   HCT 53.7* 50.2 49.5    185 169     Recent Labs     09/22/20  0537 09/23/20  0515 09/24/20  0641   * 139 137   K 4.0 3.8 4.1   CL 96* 103 102   CO2 33* 27 25   BUN 23* 28* 18   CREATININE 0.9 0.9 0.8*   CALCIUM 9.4 8.9 9.2     No results for input(s): AST, ALT, BILIDIR, BILITOT, ALKPHOS in the last 72 hours. No results for input(s): INR in the last 72 hours. No results for input(s): [place] Dec in the last 72 hours.     Urinalysis:      Lab Results   Component Value Date    NITRU Negative 09/23/2020    WBCUA 3-5 03/09/2020    BACTERIA 1+ 03/09/2020    RBCUA 5-10 03/09/2020    BLOODU Negative 09/23/2020    SPECGRAV 1.015 09/23/2020    GLUCOSEU Negative 09/23/2020    GLUCOSEU NEGATIVE 03/16/2011 Radiology:  VL PRE OP VEIN MAPPING         VL DUP CAROTID BILATERAL                 Assessment/Plan:    Active Hospital Problems    Diagnosis    Unstable angina ([facility] [I20.0]    Abnormal cardiovascular stress test [R94.39]    CAD in native artery [I25.10]    Hyperlipidemia [E78.5]    Essential hypertension [I10]    Chronic diastolic congestive heart failure (HCC) [I50.32]    Class 3 severe obesity with body mass index (BMI) of 45.0 to 49.9 in adult ([facility] [E66.01, Z68.42]    Tobacco abuse [Z72.0]    Depression [F32.9]    History of alcohol abuse [F10.11]    Chronic obstructive pulmonary disease ([facility] [J44.9]     PLAN:    Unstable angina pectoris  Admitted with chest pain  Severe multi vessel coronary artery diease noted at the cardiac cath performed on 9/22/2020  Coronary artery bypass graft planned for 9/25/2020. Patient agreeable. Does not have further questions    Chronic diastolic CHF  Patient is euvolemic. Continue current management, including oral Lasix. COPD without exacerbation  Continue Symbicort and duo nebs every 4 hours. No signs of exacerbation.     Obstructive sleep apnea  Continue CPAP at night     Morbid obesity  Body mass index is 48.25 kg/m². Complicating assessment and treatment, placing patient at high risk for multiple co-morbidities as well as early death and contributing to the patient's presentation. Counseled on weight loss.     Hyperlipidemia  Continue high-intensity statin     Discussed with the patient, questions answered. DVT Prophylaxis: Heparin drip  Diet: DIET CARDIAC;  Code Status: Full Code    PT/OT Eval Status: To be ordered postoperatively    Dispo -inpatient stay. Disposition will depend on postoperative course.     Chidi Montiel MD

## 2020-09-24 NOTE — PROGRESS NOTES
loss through dietary modification and exercise, and will continue to have those conversations with him post op.    Surgery scheduled for tomorrow am.    Leonides Gillespie MD, FACS, [facility] Samir Johnson

## 2020-09-25 ENCOUNTER — APPOINTMENT (OUTPATIENT)
Dept: GENERAL RADIOLOGY | Age: 60
DRG: 234 | End: 2020-09-25
Attending: INTERNAL MEDICINE
Payer: COMMERCIAL

## 2020-09-25 ENCOUNTER — ANESTHESIA (OUTPATIENT)
Dept: OPERATING ROOM | Age: 60
DRG: 234 | End: 2020-09-25
Payer: COMMERCIAL

## 2020-09-25 VITALS
TEMPERATURE: 98.5 F | OXYGEN SATURATION: 100 % | RESPIRATION RATE: 15 BRPM | SYSTOLIC BLOOD PRESSURE: 79 MMHG | DIASTOLIC BLOOD PRESSURE: 48 MMHG

## 2020-09-25 LAB
ALBUMIN SERPL-MCNC: 3.8 G/DL (ref 3.4–5)
ALP BLD-CCNC: 66 U/L (ref 40–129)
ALT SERPL-CCNC: 55 U/L (ref 10–40)
ANION GAP SERPL CALCULATED.3IONS-SCNC: 11 MMOL/L (ref 3–16)
ANION GAP SERPL CALCULATED.3IONS-SCNC: 6 MMOL/L (ref 3–16)
APTT: 21.7 SEC (ref 24.2–36.2)
APTT: 49.8 SEC (ref 24.2–36.2)
AST SERPL-CCNC: 22 U/L (ref 15–37)
BASE EXCESS ARTERIAL: -1 (ref -3–3)
BASE EXCESS ARTERIAL: -2 (ref -3–3)
BASE EXCESS ARTERIAL: -4 (ref -3–3)
BASE EXCESS ARTERIAL: -4 (ref -3–3)
BASE EXCESS ARTERIAL: 0 (ref -3–3)
BASE EXCESS ARTERIAL: 1 (ref -3–3)
BASE EXCESS ARTERIAL: 5 (ref -3–3)
BASOPHILS ABSOLUTE: 0.1 K/UL (ref 0–0.2)
BASOPHILS RELATIVE PERCENT: 0.7 %
BILIRUB SERPL-MCNC: 1 MG/DL (ref 0–1)
BILIRUBIN DIRECT: <0.2 MG/DL (ref 0–0.3)
BILIRUBIN, INDIRECT: ABNORMAL MG/DL (ref 0–1)
BUN BLDV-MCNC: 16 MG/DL (ref 7–20)
BUN BLDV-MCNC: 19 MG/DL (ref 7–20)
CALCIUM IONIZED: 1.07 MMOL/L (ref 1.12–1.32)
CALCIUM IONIZED: 1.1 MMOL/L (ref 1.12–1.32)
CALCIUM IONIZED: 1.11 MMOL/L (ref 1.12–1.32)
CALCIUM IONIZED: 1.29 MMOL/L (ref 1.12–1.32)
CALCIUM IONIZED: 1.42 MMOL/L (ref 1.12–1.32)
CALCIUM IONIZED: 1.71 MMOL/L (ref 1.12–1.32)
CALCIUM SERPL-MCNC: 8.6 MG/DL (ref 8.3–10.6)
CALCIUM SERPL-MCNC: 9.5 MG/DL (ref 8.3–10.6)
CHLORIDE BLD-SCNC: 102 MMOL/L (ref 99–110)
CHLORIDE BLD-SCNC: 106 MMOL/L (ref 99–110)
CHOLESTEROL, TOTAL: 85 MG/DL (ref 0–199)
CO2: 24 MMOL/L (ref 21–32)
CO2: 25 MMOL/L (ref 21–32)
CREAT SERPL-MCNC: 0.9 MG/DL (ref 0.9–1.3)
CREAT SERPL-MCNC: 0.9 MG/DL (ref 0.9–1.3)
EOSINOPHILS ABSOLUTE: 0.1 K/UL (ref 0–0.6)
EOSINOPHILS RELATIVE PERCENT: 0.9 %
ESTIMATED AVERAGE GLUCOSE: 125.5 MG/DL
GFR AFRICAN AMERICAN: >60
GFR AFRICAN AMERICAN: >60
GFR NON-AFRICAN AMERICAN: >60
GFR NON-AFRICAN AMERICAN: >60
GLUCOSE BLD-MCNC: 106 MG/DL (ref 70–99)
GLUCOSE BLD-MCNC: 108 MG/DL (ref 70–99)
GLUCOSE BLD-MCNC: 113 MG/DL (ref 70–99)
GLUCOSE BLD-MCNC: 125 MG/DL (ref 70–99)
GLUCOSE BLD-MCNC: 127 MG/DL (ref 70–99)
GLUCOSE BLD-MCNC: 132 MG/DL (ref 70–99)
GLUCOSE BLD-MCNC: 135 MG/DL (ref 70–99)
GLUCOSE BLD-MCNC: 135 MG/DL (ref 70–99)
GLUCOSE BLD-MCNC: 137 MG/DL (ref 70–99)
GLUCOSE BLD-MCNC: 147 MG/DL (ref 70–99)
GLUCOSE BLD-MCNC: 160 MG/DL (ref 70–99)
GLUCOSE BLD-MCNC: 161 MG/DL (ref 70–99)
GLUCOSE BLD-MCNC: 165 MG/DL (ref 70–99)
GLUCOSE BLD-MCNC: 181 MG/DL (ref 70–99)
GLUCOSE BLD-MCNC: 265 MG/DL (ref 70–99)
HBA1C MFR BLD: 6 %
HCO3 ARTERIAL: 22.6 MMOL/L (ref 21–29)
HCO3 ARTERIAL: 23.2 MMOL/L (ref 21–29)
HCO3 ARTERIAL: 23.6 MMOL/L (ref 21–29)
HCO3 ARTERIAL: 23.8 MMOL/L (ref 21–29)
HCO3 ARTERIAL: 23.8 MMOL/L (ref 21–29)
HCO3 ARTERIAL: 24.3 MMOL/L (ref 21–29)
HCO3 ARTERIAL: 24.9 MMOL/L (ref 21–29)
HCO3 ARTERIAL: 25.3 MMOL/L (ref 21–29)
HCO3 ARTERIAL: 25.7 MMOL/L (ref 21–29)
HCO3 ARTERIAL: 25.7 MMOL/L (ref 21–29)
HCO3 ARTERIAL: 29.3 MMOL/L (ref 21–29)
HCT VFR BLD CALC: 42.6 % (ref 40.5–52.5)
HCT VFR BLD CALC: 49.4 % (ref 40.5–52.5)
HDLC SERPL-MCNC: 34 MG/DL (ref 40–60)
HEMOGLOBIN: 11.8 GM/DL (ref 13.5–17.5)
HEMOGLOBIN: 12.1 GM/DL (ref 13.5–17.5)
HEMOGLOBIN: 12.1 GM/DL (ref 13.5–17.5)
HEMOGLOBIN: 12.3 GM/DL (ref 13.5–17.5)
HEMOGLOBIN: 12.7 GM/DL (ref 13.5–17.5)
HEMOGLOBIN: 13.7 G/DL (ref 13.5–17.5)
HEMOGLOBIN: 13.8 GM/DL (ref 13.5–17.5)
HEMOGLOBIN: 14.2 GM/DL (ref 13.5–17.5)
HEMOGLOBIN: 16.6 G/DL (ref 13.5–17.5)
HEMOGLOBIN: 17.4 GM/DL (ref 13.5–17.5)
INR BLD: 1.16 (ref 0.86–1.14)
LACTATE: 1.37 MMOL/L (ref 0.4–2)
LDL CHOLESTEROL CALCULATED: 16 MG/DL
LYMPHOCYTES ABSOLUTE: 2.4 K/UL (ref 1–5.1)
LYMPHOCYTES RELATIVE PERCENT: 19.8 %
MAGNESIUM: 2.4 MG/DL (ref 1.8–2.4)
MAGNESIUM: 2.7 MG/DL (ref 1.8–2.4)
MCH RBC QN AUTO: 29.4 PG (ref 26–34)
MCH RBC QN AUTO: 29.8 PG (ref 26–34)
MCHC RBC AUTO-ENTMCNC: 32.2 G/DL (ref 31–36)
MCHC RBC AUTO-ENTMCNC: 33.6 G/DL (ref 31–36)
MCV RBC AUTO: 88.7 FL (ref 80–100)
MCV RBC AUTO: 91.2 FL (ref 80–100)
MONOCYTES ABSOLUTE: 0.8 K/UL (ref 0–1.3)
MONOCYTES RELATIVE PERCENT: 6.5 %
NEUTROPHILS ABSOLUTE: 8.9 K/UL (ref 1.7–7.7)
NEUTROPHILS RELATIVE PERCENT: 72.1 %
O2 SAT, ARTERIAL: 100 % (ref 93–100)
O2 SAT, ARTERIAL: 97 % (ref 93–100)
O2 SAT, ARTERIAL: 98 % (ref 93–100)
O2 SAT, ARTERIAL: 99 % (ref 93–100)
PCO2 ARTERIAL: 36.6 MM HG (ref 35–45)
PCO2 ARTERIAL: 36.9 MM HG (ref 35–45)
PCO2 ARTERIAL: 37.1 MM HG (ref 35–45)
PCO2 ARTERIAL: 38.6 MM HG (ref 35–45)
PCO2 ARTERIAL: 41.8 MM HG (ref 35–45)
PCO2 ARTERIAL: 45 MM HG (ref 35–45)
PCO2 ARTERIAL: 45.8 MM HG (ref 35–45)
PCO2 ARTERIAL: 52 MM HG (ref 35–45)
PCO2 ARTERIAL: 53.6 MM HG (ref 35–45)
PCO2 ARTERIAL: 58 MM HG (ref 35–45)
PCO2 ARTERIAL: 62.7 MM HG (ref 35–45)
PDW BLD-RTO: 15 % (ref 12.4–15.4)
PDW BLD-RTO: 15.2 % (ref 12.4–15.4)
PERFORMED ON: ABNORMAL
PH ARTERIAL: 7.22 (ref 7.35–7.45)
PH ARTERIAL: 7.25 (ref 7.35–7.45)
PH ARTERIAL: 7.26 (ref 7.35–7.45)
PH ARTERIAL: 7.28 (ref 7.35–7.45)
PH ARTERIAL: 7.31 (ref 7.35–7.45)
PH ARTERIAL: 7.36 (ref 7.35–7.45)
PH ARTERIAL: 7.41 (ref 7.35–7.45)
PH ARTERIAL: 7.41 (ref 7.35–7.45)
PH ARTERIAL: 7.42 (ref 7.35–7.45)
PH ARTERIAL: 7.42 (ref 7.35–7.45)
PH ARTERIAL: 7.43 (ref 7.35–7.45)
PLATELET # BLD: 121 K/UL (ref 135–450)
PLATELET # BLD: 167 K/UL (ref 135–450)
PMV BLD AUTO: 8.1 FL (ref 5–10.5)
PMV BLD AUTO: 8.8 FL (ref 5–10.5)
PO2 ARTERIAL: 123.3 MM HG (ref 75–108)
PO2 ARTERIAL: 146.4 MM HG (ref 75–108)
PO2 ARTERIAL: 166.4 MM HG (ref 75–108)
PO2 ARTERIAL: 193.9 MM HG (ref 75–108)
PO2 ARTERIAL: 294.5 MM HG (ref 75–108)
PO2 ARTERIAL: 299.5 MM HG (ref 75–108)
PO2 ARTERIAL: 305.1 MM HG (ref 75–108)
PO2 ARTERIAL: 311.4 MM HG (ref 75–108)
PO2 ARTERIAL: 346.5 MM HG (ref 75–108)
PO2 ARTERIAL: 364.4 MM HG (ref 75–108)
PO2 ARTERIAL: 99.6 MM HG (ref 75–108)
POC HEMATOCRIT: 35 % (ref 40.5–52.5)
POC HEMATOCRIT: 36 % (ref 40.5–52.5)
POC HEMATOCRIT: 37 % (ref 40.5–52.5)
POC HEMATOCRIT: 41 % (ref 40.5–52.5)
POC HEMATOCRIT: 42 % (ref 40.5–52.5)
POC HEMATOCRIT: 51 % (ref 40.5–52.5)
POC POTASSIUM: 3.6 MMOL/L (ref 3.5–5.1)
POC POTASSIUM: 3.7 MMOL/L (ref 3.5–5.1)
POC POTASSIUM: 3.8 MMOL/L (ref 3.5–5.1)
POC POTASSIUM: 3.9 MMOL/L (ref 3.5–5.1)
POC POTASSIUM: 4.4 MMOL/L (ref 3.5–5.1)
POC POTASSIUM: 4.7 MMOL/L (ref 3.5–5.1)
POC SAMPLE TYPE: ABNORMAL
POC SODIUM: 132 MMOL/L (ref 136–145)
POC SODIUM: 132 MMOL/L (ref 136–145)
POC SODIUM: 133 MMOL/L (ref 136–145)
POC SODIUM: 134 MMOL/L (ref 136–145)
POC SODIUM: 139 MMOL/L (ref 136–145)
POC SODIUM: 140 MMOL/L (ref 136–145)
POTASSIUM SERPL-SCNC: 3.9 MMOL/L (ref 3.5–5.1)
POTASSIUM SERPL-SCNC: 4.3 MMOL/L (ref 3.5–5.1)
POTASSIUM SERPL-SCNC: 5.5 MMOL/L (ref 3.5–5.1)
PROTHROMBIN TIME: 13.5 SEC (ref 10–13.2)
RBC # BLD: 4.68 M/UL (ref 4.2–5.9)
RBC # BLD: 5.57 M/UL (ref 4.2–5.9)
SODIUM BLD-SCNC: 136 MMOL/L (ref 136–145)
SODIUM BLD-SCNC: 138 MMOL/L (ref 136–145)
TCO2 ARTERIAL: 24 MMOL/L
TCO2 ARTERIAL: 25 MMOL/L
TCO2 ARTERIAL: 27 MMOL/L
TCO2 ARTERIAL: 27 MMOL/L
TCO2 ARTERIAL: 28 MMOL/L
TCO2 ARTERIAL: 28 MMOL/L
TCO2 ARTERIAL: 31 MMOL/L
TOTAL PROTEIN: 6.5 G/DL (ref 6.4–8.2)
TRIGL SERPL-MCNC: 173 MG/DL (ref 0–150)
VLDLC SERPL CALC-MCNC: 35 MG/DL
WBC # BLD: 12.3 K/UL (ref 4–11)
WBC # BLD: 41.6 K/UL (ref 4–11)

## 2020-09-25 PROCEDURE — 2720000010 HC SURG SUPPLY STERILE: Performed by: THORACIC SURGERY (CARDIOTHORACIC VASCULAR SURGERY)

## 2020-09-25 PROCEDURE — 3700000001 HC ADD 15 MINUTES (ANESTHESIA): Performed by: THORACIC SURGERY (CARDIOTHORACIC VASCULAR SURGERY)

## 2020-09-25 PROCEDURE — 85027 COMPLETE CBC AUTOMATED: CPT

## 2020-09-25 PROCEDURE — 7100000010 HC PHASE II RECOVERY - FIRST 15 MIN

## 2020-09-25 PROCEDURE — 2500000003 HC RX 250 WO HCPCS: Performed by: ANESTHESIOLOGY

## 2020-09-25 PROCEDURE — 82330 ASSAY OF CALCIUM: CPT

## 2020-09-25 PROCEDURE — 83735 ASSAY OF MAGNESIUM: CPT

## 2020-09-25 PROCEDURE — 37799 UNLISTED PX VASCULAR SURGERY: CPT

## 2020-09-25 PROCEDURE — 2500000003 HC RX 250 WO HCPCS: Performed by: INTERNAL MEDICINE

## 2020-09-25 PROCEDURE — 2700000000 HC OXYGEN THERAPY PER DAY

## 2020-09-25 PROCEDURE — 2580000003 HC RX 258: Performed by: THORACIC SURGERY (CARDIOTHORACIC VASCULAR SURGERY)

## 2020-09-25 PROCEDURE — 2580000003 HC RX 258: Performed by: NURSE PRACTITIONER

## 2020-09-25 PROCEDURE — 80076 HEPATIC FUNCTION PANEL: CPT

## 2020-09-25 PROCEDURE — 6370000000 HC RX 637 (ALT 250 FOR IP): Performed by: THORACIC SURGERY (CARDIOTHORACIC VASCULAR SURGERY)

## 2020-09-25 PROCEDURE — 33508 ENDOSCOPIC VEIN HARVEST: CPT | Performed by: THORACIC SURGERY (CARDIOTHORACIC VASCULAR SURGERY)

## 2020-09-25 PROCEDURE — 85610 PROTHROMBIN TIME: CPT

## 2020-09-25 PROCEDURE — P9045 ALBUMIN (HUMAN), 5%, 250 ML: HCPCS | Performed by: ANESTHESIOLOGY

## 2020-09-25 PROCEDURE — 3600000018 HC SURGERY OHS ADDTL 15MIN: Performed by: THORACIC SURGERY (CARDIOTHORACIC VASCULAR SURGERY)

## 2020-09-25 PROCEDURE — 6370000000 HC RX 637 (ALT 250 FOR IP): Performed by: ANESTHESIOLOGY

## 2020-09-25 PROCEDURE — 02HQ32Z INSERTION OF MONITORING DEVICE INTO RIGHT PULMONARY ARTERY, PERCUTANEOUS APPROACH: ICD-10-PCS | Performed by: THORACIC SURGERY (CARDIOTHORACIC VASCULAR SURGERY)

## 2020-09-25 PROCEDURE — 021009W BYPASS CORONARY ARTERY, ONE ARTERY FROM AORTA WITH AUTOLOGOUS VENOUS TISSUE, OPEN APPROACH: ICD-10-PCS | Performed by: THORACIC SURGERY (CARDIOTHORACIC VASCULAR SURGERY)

## 2020-09-25 PROCEDURE — 85014 HEMATOCRIT: CPT

## 2020-09-25 PROCEDURE — 6360000002 HC RX W HCPCS: Performed by: NURSE PRACTITIONER

## 2020-09-25 PROCEDURE — 2709999900 HC NON-CHARGEABLE SUPPLY: Performed by: THORACIC SURGERY (CARDIOTHORACIC VASCULAR SURGERY)

## 2020-09-25 PROCEDURE — 94761 N-INVAS EAR/PLS OXIMETRY MLT: CPT

## 2020-09-25 PROCEDURE — 84295 ASSAY OF SERUM SODIUM: CPT

## 2020-09-25 PROCEDURE — 6360000002 HC RX W HCPCS: Performed by: ANESTHESIOLOGY

## 2020-09-25 PROCEDURE — 82803 BLOOD GASES ANY COMBINATION: CPT

## 2020-09-25 PROCEDURE — B24BZZ4 ULTRASONOGRAPHY OF HEART WITH AORTA, TRANSESOPHAGEAL: ICD-10-PCS | Performed by: THORACIC SURGERY (CARDIOTHORACIC VASCULAR SURGERY)

## 2020-09-25 PROCEDURE — 83605 ASSAY OF LACTIC ACID: CPT

## 2020-09-25 PROCEDURE — 82947 ASSAY GLUCOSE BLOOD QUANT: CPT

## 2020-09-25 PROCEDURE — 33518 CABG ARTERY-VEIN TWO: CPT | Performed by: THORACIC SURGERY (CARDIOTHORACIC VASCULAR SURGERY)

## 2020-09-25 PROCEDURE — C1786 PMKR, SINGLE, RATE-RESP: HCPCS | Performed by: THORACIC SURGERY (CARDIOTHORACIC VASCULAR SURGERY)

## 2020-09-25 PROCEDURE — 99233 SBSQ HOSP IP/OBS HIGH 50: CPT | Performed by: INTERNAL MEDICINE

## 2020-09-25 PROCEDURE — 80048 BASIC METABOLIC PNL TOTAL CA: CPT

## 2020-09-25 PROCEDURE — 06BP4ZZ EXCISION OF RIGHT SAPHENOUS VEIN, PERCUTANEOUS ENDOSCOPIC APPROACH: ICD-10-PCS | Performed by: THORACIC SURGERY (CARDIOTHORACIC VASCULAR SURGERY)

## 2020-09-25 PROCEDURE — 7100000011 HC PHASE II RECOVERY - ADDTL 15 MIN

## 2020-09-25 PROCEDURE — 84132 ASSAY OF SERUM POTASSIUM: CPT

## 2020-09-25 PROCEDURE — 2500000003 HC RX 250 WO HCPCS: Performed by: THORACIC SURGERY (CARDIOTHORACIC VASCULAR SURGERY)

## 2020-09-25 PROCEDURE — 36415 COLL VENOUS BLD VENIPUNCTURE: CPT

## 2020-09-25 PROCEDURE — C1713 ANCHOR/SCREW BN/BN,TIS/BN: HCPCS | Performed by: THORACIC SURGERY (CARDIOTHORACIC VASCULAR SURGERY)

## 2020-09-25 PROCEDURE — 94660 CPAP INITIATION&MGMT: CPT

## 2020-09-25 PROCEDURE — 85730 THROMBOPLASTIN TIME PARTIAL: CPT

## 2020-09-25 PROCEDURE — C9290 INJ, BUPIVACAINE LIPOSOME: HCPCS | Performed by: THORACIC SURGERY (CARDIOTHORACIC VASCULAR SURGERY)

## 2020-09-25 PROCEDURE — 6360000002 HC RX W HCPCS: Performed by: THORACIC SURGERY (CARDIOTHORACIC VASCULAR SURGERY)

## 2020-09-25 PROCEDURE — P9045 ALBUMIN (HUMAN), 5%, 250 ML: HCPCS | Performed by: THORACIC SURGERY (CARDIOTHORACIC VASCULAR SURGERY)

## 2020-09-25 PROCEDURE — 6370000000 HC RX 637 (ALT 250 FOR IP): Performed by: NURSE PRACTITIONER

## 2020-09-25 PROCEDURE — 83036 HEMOGLOBIN GLYCOSYLATED A1C: CPT

## 2020-09-25 PROCEDURE — 3600000008 HC SURGERY OHS BASE: Performed by: THORACIC SURGERY (CARDIOTHORACIC VASCULAR SURGERY)

## 2020-09-25 PROCEDURE — 33533 CABG ARTERIAL SINGLE: CPT | Performed by: THORACIC SURGERY (CARDIOTHORACIC VASCULAR SURGERY)

## 2020-09-25 PROCEDURE — 85025 COMPLETE CBC W/AUTO DIFF WBC: CPT

## 2020-09-25 PROCEDURE — 3700000000 HC ANESTHESIA ATTENDED CARE: Performed by: THORACIC SURGERY (CARDIOTHORACIC VASCULAR SURGERY)

## 2020-09-25 PROCEDURE — 021109W BYPASS CORONARY ARTERY, TWO ARTERIES FROM AORTA WITH AUTOLOGOUS VENOUS TISSUE, OPEN APPROACH: ICD-10-PCS | Performed by: THORACIC SURGERY (CARDIOTHORACIC VASCULAR SURGERY)

## 2020-09-25 PROCEDURE — 2580000003 HC RX 258: Performed by: ANESTHESIOLOGY

## 2020-09-25 PROCEDURE — 2100000000 HC CCU R&B

## 2020-09-25 PROCEDURE — 5A1221Z PERFORMANCE OF CARDIAC OUTPUT, CONTINUOUS: ICD-10-PCS | Performed by: THORACIC SURGERY (CARDIOTHORACIC VASCULAR SURGERY)

## 2020-09-25 PROCEDURE — 71045 X-RAY EXAM CHEST 1 VIEW: CPT

## 2020-09-25 PROCEDURE — 02HV33Z INSERTION OF INFUSION DEVICE INTO SUPERIOR VENA CAVA, PERCUTANEOUS APPROACH: ICD-10-PCS | Performed by: THORACIC SURGERY (CARDIOTHORACIC VASCULAR SURGERY)

## 2020-09-25 PROCEDURE — 80061 LIPID PANEL: CPT

## 2020-09-25 PROCEDURE — 85347 COAGULATION TIME ACTIVATED: CPT

## 2020-09-25 DEVICE — IMPLANTABLE DEVICE: Type: IMPLANTABLE DEVICE | Status: FUNCTIONAL

## 2020-09-25 DEVICE — PLATE BNE 8 H JL SHP STERNALOCK BLU PRI CLSR SYS: Type: IMPLANTABLE DEVICE | Site: STERNUM | Status: FUNCTIONAL

## 2020-09-25 DEVICE — PLATE BNE 100DEG 4 H L SHP STERNALOCK BLU PRI CLSR SYS: Type: IMPLANTABLE DEVICE | Site: STERNUM | Status: FUNCTIONAL

## 2020-09-25 DEVICE — PLATE BNE 8 H X SHP STERNALOCK BLU PRI CLSR SYS: Type: IMPLANTABLE DEVICE | Site: STERNUM | Status: FUNCTIONAL

## 2020-09-25 RX ORDER — DEXTROSE MONOHYDRATE 25 G/50ML
12.5 INJECTION, SOLUTION INTRAVENOUS PRN
Status: DISCONTINUED | OUTPATIENT
Start: 2020-09-25 | End: 2020-09-29 | Stop reason: HOSPADM

## 2020-09-25 RX ORDER — POTASSIUM CHLORIDE 29.8 MG/ML
20 INJECTION INTRAVENOUS PRN
Status: DISCONTINUED | OUTPATIENT
Start: 2020-09-25 | End: 2020-09-29 | Stop reason: HOSPADM

## 2020-09-25 RX ORDER — SODIUM CHLORIDE, SODIUM LACTATE, POTASSIUM CHLORIDE, CALCIUM CHLORIDE 600; 310; 30; 20 MG/100ML; MG/100ML; MG/100ML; MG/100ML
INJECTION, SOLUTION INTRAVENOUS CONTINUOUS PRN
Status: DISCONTINUED | OUTPATIENT
Start: 2020-09-25 | End: 2020-09-25 | Stop reason: SDUPTHER

## 2020-09-25 RX ORDER — ACETAMINOPHEN 325 MG/1
650 TABLET ORAL EVERY 6 HOURS
Status: DISPENSED | OUTPATIENT
Start: 2020-09-26 | End: 2020-09-28

## 2020-09-25 RX ORDER — ASPIRIN 81 MG/1
81 TABLET ORAL DAILY
Status: DISCONTINUED | OUTPATIENT
Start: 2020-09-26 | End: 2020-09-29 | Stop reason: HOSPADM

## 2020-09-25 RX ORDER — POTASSIUM CHLORIDE 750 MG/1
10 TABLET, EXTENDED RELEASE ORAL
Status: DISCONTINUED | OUTPATIENT
Start: 2020-09-26 | End: 2020-09-29 | Stop reason: HOSPADM

## 2020-09-25 RX ORDER — NITROGLYCERIN 40 MG/1
1 PATCH TRANSDERMAL DAILY
Status: DISPENSED | OUTPATIENT
Start: 2020-09-26 | End: 2020-09-29

## 2020-09-25 RX ORDER — MIDAZOLAM HYDROCHLORIDE 1 MG/ML
1 INJECTION INTRAMUSCULAR; INTRAVENOUS
Status: DISCONTINUED | OUTPATIENT
Start: 2020-09-25 | End: 2020-09-28

## 2020-09-25 RX ORDER — MORPHINE SULFATE 2 MG/ML
2 INJECTION, SOLUTION INTRAMUSCULAR; INTRAVENOUS
Status: DISCONTINUED | OUTPATIENT
Start: 2020-09-25 | End: 2020-09-28

## 2020-09-25 RX ORDER — SUFENTANIL CITRATE 50 UG/ML
INJECTION EPIDURAL; INTRAVENOUS PRN
Status: DISCONTINUED | OUTPATIENT
Start: 2020-09-25 | End: 2020-09-25 | Stop reason: SDUPTHER

## 2020-09-25 RX ORDER — POLYETHYLENE GLYCOL 3350 17 G/17G
17 POWDER, FOR SOLUTION ORAL DAILY
Status: DISCONTINUED | OUTPATIENT
Start: 2020-09-26 | End: 2020-09-29 | Stop reason: HOSPADM

## 2020-09-25 RX ORDER — GLYCOPYRROLATE 0.2 MG/ML
INJECTION INTRAMUSCULAR; INTRAVENOUS PRN
Status: DISCONTINUED | OUTPATIENT
Start: 2020-09-25 | End: 2020-09-25 | Stop reason: SDUPTHER

## 2020-09-25 RX ORDER — KETOROLAC TROMETHAMINE 30 MG/ML
15 INJECTION, SOLUTION INTRAMUSCULAR; INTRAVENOUS EVERY 6 HOURS
Status: COMPLETED | OUTPATIENT
Start: 2020-09-25 | End: 2020-09-27

## 2020-09-25 RX ORDER — SODIUM CHLORIDE 0.9 % (FLUSH) 0.9 %
10 SYRINGE (ML) INJECTION PRN
Status: DISCONTINUED | OUTPATIENT
Start: 2020-09-25 | End: 2020-09-29 | Stop reason: HOSPADM

## 2020-09-25 RX ORDER — MIDAZOLAM HYDROCHLORIDE 1 MG/ML
INJECTION INTRAMUSCULAR; INTRAVENOUS PRN
Status: DISCONTINUED | OUTPATIENT
Start: 2020-09-25 | End: 2020-09-25 | Stop reason: SDUPTHER

## 2020-09-25 RX ORDER — ALBUMIN, HUMAN INJ 5% 5 %
SOLUTION INTRAVENOUS PRN
Status: DISCONTINUED | OUTPATIENT
Start: 2020-09-25 | End: 2020-09-25 | Stop reason: SDUPTHER

## 2020-09-25 RX ORDER — AMINOCAPROIC ACID 250 MG/ML
INJECTION, SOLUTION INTRAVENOUS PRN
Status: DISCONTINUED | OUTPATIENT
Start: 2020-09-25 | End: 2020-09-25 | Stop reason: SDUPTHER

## 2020-09-25 RX ORDER — MILRINONE LACTATE 0.2 MG/ML
0.38 INJECTION, SOLUTION INTRAVENOUS CONTINUOUS
Status: DISCONTINUED | OUTPATIENT
Start: 2020-09-25 | End: 2020-09-28

## 2020-09-25 RX ORDER — PROTAMINE SULFATE 10 MG/ML
50 INJECTION, SOLUTION INTRAVENOUS
Status: ACTIVE | OUTPATIENT
Start: 2020-09-25 | End: 2020-09-25

## 2020-09-25 RX ORDER — SODIUM CHLORIDE 9 MG/ML
INJECTION, SOLUTION INTRAVENOUS CONTINUOUS PRN
Status: DISCONTINUED | OUTPATIENT
Start: 2020-09-25 | End: 2020-09-25 | Stop reason: SDUPTHER

## 2020-09-25 RX ORDER — OXYCODONE HYDROCHLORIDE 5 MG/1
5 TABLET ORAL EVERY 4 HOURS PRN
Status: DISCONTINUED | OUTPATIENT
Start: 2020-09-25 | End: 2020-09-29 | Stop reason: HOSPADM

## 2020-09-25 RX ORDER — FONDAPARINUX SODIUM 2.5 MG/.5ML
2.5 INJECTION SUBCUTANEOUS DAILY
Status: DISCONTINUED | OUTPATIENT
Start: 2020-09-26 | End: 2020-09-29 | Stop reason: HOSPADM

## 2020-09-25 RX ORDER — MAGNESIUM SULFATE IN WATER 40 MG/ML
2 INJECTION, SOLUTION INTRAVENOUS PRN
Status: DISCONTINUED | OUTPATIENT
Start: 2020-09-25 | End: 2020-09-29 | Stop reason: HOSPADM

## 2020-09-25 RX ORDER — MILRINONE LACTATE 0.2 MG/ML
INJECTION, SOLUTION INTRAVENOUS CONTINUOUS PRN
Status: DISCONTINUED | OUTPATIENT
Start: 2020-09-25 | End: 2020-09-25 | Stop reason: SDUPTHER

## 2020-09-25 RX ORDER — NEOSTIGMINE METHYLSULFATE 5 MG/5 ML
SYRINGE (ML) INTRAVENOUS PRN
Status: DISCONTINUED | OUTPATIENT
Start: 2020-09-25 | End: 2020-09-25 | Stop reason: SDUPTHER

## 2020-09-25 RX ORDER — PROTAMINE SULFATE 10 MG/ML
INJECTION, SOLUTION INTRAVENOUS PRN
Status: DISCONTINUED | OUTPATIENT
Start: 2020-09-25 | End: 2020-09-25 | Stop reason: SDUPTHER

## 2020-09-25 RX ORDER — ACETAMINOPHEN 650 MG/1
SUPPOSITORY RECTAL PRN
Status: DISCONTINUED | OUTPATIENT
Start: 2020-09-25 | End: 2020-09-25 | Stop reason: ALTCHOICE

## 2020-09-25 RX ORDER — CISATRACURIUM BESYLATE 2 MG/ML
INJECTION, SOLUTION INTRAVENOUS PRN
Status: DISCONTINUED | OUTPATIENT
Start: 2020-09-25 | End: 2020-09-25 | Stop reason: SDUPTHER

## 2020-09-25 RX ORDER — INSULIN GLARGINE 100 [IU]/ML
0.15 INJECTION, SOLUTION SUBCUTANEOUS NIGHTLY
Status: DISCONTINUED | OUTPATIENT
Start: 2020-09-26 | End: 2020-09-28

## 2020-09-25 RX ORDER — DIPHENHYDRAMINE HCL 25 MG
25 TABLET ORAL NIGHTLY PRN
Status: DISCONTINUED | OUTPATIENT
Start: 2020-09-26 | End: 2020-09-29 | Stop reason: HOSPADM

## 2020-09-25 RX ORDER — MORPHINE SULFATE 4 MG/ML
4 INJECTION, SOLUTION INTRAMUSCULAR; INTRAVENOUS
Status: DISCONTINUED | OUTPATIENT
Start: 2020-09-25 | End: 2020-09-28

## 2020-09-25 RX ORDER — ONDANSETRON 2 MG/ML
4 INJECTION INTRAMUSCULAR; INTRAVENOUS EVERY 8 HOURS PRN
Status: DISCONTINUED | OUTPATIENT
Start: 2020-09-25 | End: 2020-09-29 | Stop reason: HOSPADM

## 2020-09-25 RX ORDER — BISACODYL 10 MG
10 SUPPOSITORY, RECTAL RECTAL DAILY PRN
Status: DISCONTINUED | OUTPATIENT
Start: 2020-09-25 | End: 2020-09-29 | Stop reason: HOSPADM

## 2020-09-25 RX ORDER — ALBUMIN, HUMAN INJ 5% 5 %
25 SOLUTION INTRAVENOUS PRN
Status: DISCONTINUED | OUTPATIENT
Start: 2020-09-25 | End: 2020-09-28

## 2020-09-25 RX ORDER — ALBUMIN, HUMAN INJ 5% 5 %
25 SOLUTION INTRAVENOUS ONCE
Status: COMPLETED | OUTPATIENT
Start: 2020-09-25 | End: 2020-09-25

## 2020-09-25 RX ORDER — ASPIRIN 300 MG/1
300 SUPPOSITORY RECTAL ONCE
Status: DISCONTINUED | OUTPATIENT
Start: 2020-09-25 | End: 2020-09-28

## 2020-09-25 RX ORDER — OXYCODONE HYDROCHLORIDE 5 MG/1
10 TABLET ORAL EVERY 4 HOURS PRN
Status: DISCONTINUED | OUTPATIENT
Start: 2020-09-25 | End: 2020-09-29 | Stop reason: HOSPADM

## 2020-09-25 RX ORDER — MEPERIDINE HYDROCHLORIDE 50 MG/ML
25 INJECTION INTRAMUSCULAR; INTRAVENOUS; SUBCUTANEOUS
Status: ACTIVE | OUTPATIENT
Start: 2020-09-25 | End: 2020-09-25

## 2020-09-25 RX ORDER — KETAMINE HYDROCHLORIDE 100 MG/ML
INJECTION, SOLUTION INTRAMUSCULAR; INTRAVENOUS PRN
Status: DISCONTINUED | OUTPATIENT
Start: 2020-09-25 | End: 2020-09-25 | Stop reason: SDUPTHER

## 2020-09-25 RX ORDER — DIGOXIN 125 MCG
125 TABLET ORAL DAILY
Status: DISCONTINUED | OUTPATIENT
Start: 2020-09-26 | End: 2020-09-29 | Stop reason: HOSPADM

## 2020-09-25 RX ORDER — SODIUM CHLORIDE 0.9 % (FLUSH) 0.9 %
10 SYRINGE (ML) INJECTION EVERY 12 HOURS SCHEDULED
Status: DISCONTINUED | OUTPATIENT
Start: 2020-09-25 | End: 2020-09-29 | Stop reason: HOSPADM

## 2020-09-25 RX ORDER — HEPARIN SODIUM 1000 [USP'U]/ML
INJECTION, SOLUTION INTRAVENOUS; SUBCUTANEOUS PRN
Status: DISCONTINUED | OUTPATIENT
Start: 2020-09-25 | End: 2020-09-25 | Stop reason: SDUPTHER

## 2020-09-25 RX ORDER — NICOTINE POLACRILEX 4 MG
15 LOZENGE BUCCAL PRN
Status: DISCONTINUED | OUTPATIENT
Start: 2020-09-25 | End: 2020-09-29 | Stop reason: HOSPADM

## 2020-09-25 RX ORDER — DEXTROSE MONOHYDRATE 50 MG/ML
100 INJECTION, SOLUTION INTRAVENOUS PRN
Status: DISCONTINUED | OUTPATIENT
Start: 2020-09-25 | End: 2020-09-29 | Stop reason: HOSPADM

## 2020-09-25 RX ORDER — DOBUTAMINE HYDROCHLORIDE 200 MG/100ML
2 INJECTION INTRAVENOUS CONTINUOUS PRN
Status: DISCONTINUED | OUTPATIENT
Start: 2020-09-25 | End: 2020-09-28

## 2020-09-25 RX ORDER — CHLORHEXIDINE GLUCONATE 0.12 MG/ML
15 RINSE ORAL 2 TIMES DAILY
Status: DISCONTINUED | OUTPATIENT
Start: 2020-09-25 | End: 2020-09-29 | Stop reason: HOSPADM

## 2020-09-25 RX ORDER — CALCIUM CHLORIDE 100 MG/ML
INJECTION INTRAVENOUS; INTRAVENTRICULAR PRN
Status: DISCONTINUED | OUTPATIENT
Start: 2020-09-25 | End: 2020-09-25 | Stop reason: SDUPTHER

## 2020-09-25 RX ORDER — FUROSEMIDE 10 MG/ML
40 INJECTION INTRAMUSCULAR; INTRAVENOUS
Status: ACTIVE | OUTPATIENT
Start: 2020-09-25 | End: 2020-09-25

## 2020-09-25 RX ORDER — SUCCINYLCHOLINE CHLORIDE 20 MG/ML
INJECTION INTRAMUSCULAR; INTRAVENOUS PRN
Status: DISCONTINUED | OUTPATIENT
Start: 2020-09-25 | End: 2020-09-25 | Stop reason: SDUPTHER

## 2020-09-25 RX ORDER — DOBUTAMINE HYDROCHLORIDE 200 MG/100ML
5 INJECTION INTRAVENOUS CONTINUOUS
Status: DISCONTINUED | OUTPATIENT
Start: 2020-09-25 | End: 2020-09-25

## 2020-09-25 RX ORDER — ALBUTEROL SULFATE 90 UG/1
2 AEROSOL, METERED RESPIRATORY (INHALATION) EVERY 6 HOURS PRN
Status: DISCONTINUED | OUTPATIENT
Start: 2020-09-25 | End: 2020-09-29 | Stop reason: HOSPADM

## 2020-09-25 RX ORDER — EPHEDRINE SULFATE 50 MG/ML
INJECTION INTRAVENOUS PRN
Status: DISCONTINUED | OUTPATIENT
Start: 2020-09-25 | End: 2020-09-25 | Stop reason: SDUPTHER

## 2020-09-25 RX ORDER — FUROSEMIDE 10 MG/ML
40 INJECTION INTRAMUSCULAR; INTRAVENOUS 2 TIMES DAILY
Status: DISCONTINUED | OUTPATIENT
Start: 2020-09-26 | End: 2020-09-29

## 2020-09-25 RX ORDER — SODIUM CHLORIDE, SODIUM LACTATE, POTASSIUM CHLORIDE, AND CALCIUM CHLORIDE .6; .31; .03; .02 G/100ML; G/100ML; G/100ML; G/100ML
250 INJECTION, SOLUTION INTRAVENOUS CONTINUOUS PRN
Status: DISCONTINUED | OUTPATIENT
Start: 2020-09-25 | End: 2020-09-28

## 2020-09-25 RX ORDER — NITROGLYCERIN 20 MG/100ML
10 INJECTION INTRAVENOUS CONTINUOUS PRN
Status: DISCONTINUED | OUTPATIENT
Start: 2020-09-25 | End: 2020-09-28

## 2020-09-25 RX ORDER — SODIUM CHLORIDE, SODIUM LACTATE, POTASSIUM CHLORIDE, CALCIUM CHLORIDE 600; 310; 30; 20 MG/100ML; MG/100ML; MG/100ML; MG/100ML
INJECTION, SOLUTION INTRAVENOUS CONTINUOUS
Status: DISCONTINUED | OUTPATIENT
Start: 2020-09-25 | End: 2020-09-28

## 2020-09-25 RX ADMIN — DOBUTAMINE HYDROCHLORIDE 3 MCG/KG/MIN: 200 INJECTION INTRAVENOUS at 12:48

## 2020-09-25 RX ADMIN — ALBUMIN (HUMAN) 25 G: 12.5 INJECTION, SOLUTION INTRAVENOUS at 12:39

## 2020-09-25 RX ADMIN — AMINOCAPROIC ACID 5000 MG: 250 INJECTION, SOLUTION INTRAVENOUS at 09:00

## 2020-09-25 RX ADMIN — MORPHINE SULFATE 4 MG: 4 INJECTION, SOLUTION INTRAMUSCULAR; INTRAVENOUS at 22:32

## 2020-09-25 RX ADMIN — HEPARIN SODIUM 50000 UNITS: 1000 INJECTION, SOLUTION INTRAVENOUS; SUBCUTANEOUS at 08:52

## 2020-09-25 RX ADMIN — SODIUM CHLORIDE 0.92 UNITS/HR: 9 INJECTION, SOLUTION INTRAVENOUS at 12:45

## 2020-09-25 RX ADMIN — KETOROLAC TROMETHAMINE 15 MG: 30 INJECTION, SOLUTION INTRAMUSCULAR at 13:37

## 2020-09-25 RX ADMIN — MIDAZOLAM 5 MG: 1 INJECTION INTRAMUSCULAR; INTRAVENOUS at 06:27

## 2020-09-25 RX ADMIN — Medication 3 G: at 07:30

## 2020-09-25 RX ADMIN — CISATRACURIUM BESYLATE 20 MG: 2 INJECTION INTRAVENOUS at 11:02

## 2020-09-25 RX ADMIN — SODIUM CHLORIDE 6 UNITS/HR: 9 INJECTION, SOLUTION INTRAVENOUS at 09:02

## 2020-09-25 RX ADMIN — VANCOMYCIN HYDROCHLORIDE 2 G: 1 INJECTION, POWDER, LYOPHILIZED, FOR SOLUTION INTRAVENOUS at 07:30

## 2020-09-25 RX ADMIN — SODIUM CHLORIDE: 9 INJECTION, SOLUTION INTRAVENOUS at 07:30

## 2020-09-25 RX ADMIN — SODIUM CHLORIDE: 9 INJECTION, SOLUTION INTRAVENOUS at 04:46

## 2020-09-25 RX ADMIN — CISATRACURIUM BESYLATE 10 MG: 2 INJECTION INTRAVENOUS at 10:00

## 2020-09-25 RX ADMIN — CISATRACURIUM BESYLATE 20 MG: 2 INJECTION INTRAVENOUS at 08:00

## 2020-09-25 RX ADMIN — ONDANSETRON 4 MG: 2 INJECTION INTRAMUSCULAR; INTRAVENOUS at 14:03

## 2020-09-25 RX ADMIN — SODIUM CHLORIDE, SODIUM LACTATE, POTASSIUM CHLORIDE, AND CALCIUM CHLORIDE: .6; .31; .03; .02 INJECTION, SOLUTION INTRAVENOUS at 06:55

## 2020-09-25 RX ADMIN — FAMOTIDINE 20 MG: 10 INJECTION INTRAVENOUS at 19:42

## 2020-09-25 RX ADMIN — GLYCOPYRROLATE 0.5 MG: 0.2 INJECTION, SOLUTION INTRAMUSCULAR; INTRAVENOUS at 12:17

## 2020-09-25 RX ADMIN — EPHEDRINE SULFATE 5 MG: 50 INJECTION INTRAVENOUS at 11:15

## 2020-09-25 RX ADMIN — ASPIRIN 81 MG: 81 TABLET, COATED ORAL at 04:45

## 2020-09-25 RX ADMIN — CEFAZOLIN SODIUM 3 G: 10 INJECTION, POWDER, FOR SOLUTION INTRAVENOUS at 18:30

## 2020-09-25 RX ADMIN — ATORVASTATIN CALCIUM 80 MG: 80 TABLET, FILM COATED ORAL at 19:41

## 2020-09-25 RX ADMIN — SODIUM CHLORIDE, POTASSIUM CHLORIDE, SODIUM LACTATE AND CALCIUM CHLORIDE: 600; 310; 30; 20 INJECTION, SOLUTION INTRAVENOUS at 13:10

## 2020-09-25 RX ADMIN — KETOROLAC TROMETHAMINE 15 MG: 30 INJECTION, SOLUTION INTRAMUSCULAR at 19:42

## 2020-09-25 RX ADMIN — POTASSIUM CHLORIDE 20 MEQ: 400 INJECTION, SOLUTION INTRAVENOUS at 13:30

## 2020-09-25 RX ADMIN — METOPROLOL TARTRATE 12.5 MG: 25 TABLET, FILM COATED ORAL at 06:06

## 2020-09-25 RX ADMIN — MORPHINE SULFATE 4 MG: 4 INJECTION, SOLUTION INTRAMUSCULAR; INTRAVENOUS at 18:22

## 2020-09-25 RX ADMIN — Medication 15 ML: at 06:06

## 2020-09-25 RX ADMIN — Medication 1 G: at 11:17

## 2020-09-25 RX ADMIN — ALBUMIN (HUMAN) 12.5 G: 12.5 INJECTION, SOLUTION INTRAVENOUS at 13:19

## 2020-09-25 RX ADMIN — SUFENTANIL CITRATE 50 MCG: 50 INJECTION, SOLUTION EPIDURAL; INTRAVENOUS at 08:03

## 2020-09-25 RX ADMIN — CISATRACURIUM BESYLATE 20 MG: 2 INJECTION INTRAVENOUS at 07:05

## 2020-09-25 RX ADMIN — FAMOTIDINE 20 MG: 10 INJECTION INTRAVENOUS at 18:18

## 2020-09-25 RX ADMIN — MIDAZOLAM 1 MG: 1 INJECTION INTRAMUSCULAR; INTRAVENOUS at 23:07

## 2020-09-25 RX ADMIN — SODIUM CHLORIDE, SODIUM LACTATE, POTASSIUM CHLORIDE, AND CALCIUM CHLORIDE: .6; .31; .03; .02 INJECTION, SOLUTION INTRAVENOUS at 11:54

## 2020-09-25 RX ADMIN — HEPARIN SODIUM 15.4 ML/HR: 10000 INJECTION, SOLUTION INTRAVENOUS at 04:11

## 2020-09-25 RX ADMIN — SUCCINYLCHOLINE CHLORIDE 160 MG: 20 INJECTION, SOLUTION INTRAMUSCULAR; INTRAVENOUS at 07:02

## 2020-09-25 RX ADMIN — VANCOMYCIN HYDROCHLORIDE 2000 MG: 1 INJECTION, POWDER, LYOPHILIZED, FOR SOLUTION INTRAVENOUS at 19:43

## 2020-09-25 RX ADMIN — CISATRACURIUM BESYLATE 10 MG: 2 INJECTION INTRAVENOUS at 09:00

## 2020-09-25 RX ADMIN — PROTAMINE SULFATE 500 MG: 10 INJECTION, SOLUTION INTRAVENOUS at 11:05

## 2020-09-25 RX ADMIN — CALCIUM CHLORIDE 0.5 G: 100 INJECTION, SOLUTION INTRAVENOUS at 11:02

## 2020-09-25 RX ADMIN — MILRINONE LACTATE 0.5 MCG/KG/MIN: 0.2 INJECTION, SOLUTION INTRAVENOUS at 07:30

## 2020-09-25 RX ADMIN — CALCIUM CHLORIDE 0.5 G: 100 INJECTION, SOLUTION INTRAVENOUS at 11:12

## 2020-09-25 RX ADMIN — SODIUM CHLORIDE, PRESERVATIVE FREE 10 ML: 5 INJECTION INTRAVENOUS at 19:41

## 2020-09-25 RX ADMIN — MORPHINE SULFATE 4 MG: 4 INJECTION, SOLUTION INTRAMUSCULAR; INTRAVENOUS at 14:03

## 2020-09-25 RX ADMIN — MIDAZOLAM HYDROCHLORIDE 4 MG: 2 INJECTION, SOLUTION INTRAMUSCULAR; INTRAVENOUS at 07:00

## 2020-09-25 RX ADMIN — SODIUM CHLORIDE, SODIUM LACTATE, POTASSIUM CHLORIDE, AND CALCIUM CHLORIDE: .6; .31; .03; .02 INJECTION, SOLUTION INTRAVENOUS at 11:29

## 2020-09-25 RX ADMIN — VASOPRESSIN 0.02 UNITS/MIN: 20 INJECTION INTRAVENOUS at 13:33

## 2020-09-25 RX ADMIN — KETAMINE HYDROCHLORIDE 150 MG: 100 INJECTION, SOLUTION INTRAMUSCULAR; INTRAVENOUS at 07:00

## 2020-09-25 RX ADMIN — MILRINONE LACTATE: 0.2 INJECTION, SOLUTION INTRAVENOUS at 11:29

## 2020-09-25 RX ADMIN — DEXMEDETOMIDINE 0.7 MCG/HR: 100 INJECTION, SOLUTION, CONCENTRATE INTRAVENOUS at 11:00

## 2020-09-25 RX ADMIN — OXYCODONE 10 MG: 5 TABLET ORAL at 23:48

## 2020-09-25 RX ADMIN — SUFENTANIL CITRATE 50 MCG: 50 INJECTION, SOLUTION EPIDURAL; INTRAVENOUS at 07:00

## 2020-09-25 RX ADMIN — Medication 5 MG: at 12:16

## 2020-09-25 RX ADMIN — ALBUMIN (HUMAN) 500 ML: 12.5 INJECTION, SOLUTION INTRAVENOUS at 12:41

## 2020-09-25 ASSESSMENT — PULMONARY FUNCTION TESTS
PIF_VALUE: 36
PIF_VALUE: 32
PIF_VALUE: 32
PIF_VALUE: 0
PIF_VALUE: 36
PIF_VALUE: 32
PIF_VALUE: 0
PIF_VALUE: 0
PIF_VALUE: 31
PIF_VALUE: 36
PIF_VALUE: 36
PIF_VALUE: 2
PIF_VALUE: 32
PIF_VALUE: 32
PIF_VALUE: 36
PIF_VALUE: 0
PIF_VALUE: 32
PIF_VALUE: 31
PIF_VALUE: 36
PIF_VALUE: 36
PIF_VALUE: 32
PIF_VALUE: 36
PIF_VALUE: 32
PIF_VALUE: 32
PIF_VALUE: 36
PIF_VALUE: 32
PIF_VALUE: 36
PIF_VALUE: 32
PIF_VALUE: 0
PIF_VALUE: 36
PIF_VALUE: 37
PIF_VALUE: 35
PIF_VALUE: 36
PIF_VALUE: 32
PIF_VALUE: 36
PIF_VALUE: 32
PIF_VALUE: 32
PIF_VALUE: 36
PIF_VALUE: 36
PIF_VALUE: 32
PIF_VALUE: 36
PIF_VALUE: 32
PIF_VALUE: 36
PIF_VALUE: 32
PIF_VALUE: 36
PIF_VALUE: 32
PIF_VALUE: 1
PIF_VALUE: 32
PIF_VALUE: 34
PIF_VALUE: 32
PIF_VALUE: 32
PIF_VALUE: 31
PIF_VALUE: 36
PIF_VALUE: 32
PIF_VALUE: 32
PIF_VALUE: 36
PIF_VALUE: 35
PIF_VALUE: 40
PIF_VALUE: 32
PIF_VALUE: 32
PIF_VALUE: 33
PIF_VALUE: 32
PIF_VALUE: 36
PIF_VALUE: 31
PIF_VALUE: 1
PIF_VALUE: 36
PIF_VALUE: 36
PIF_VALUE: 32
PIF_VALUE: 32
PIF_VALUE: 0
PIF_VALUE: 33
PIF_VALUE: 32
PIF_VALUE: 34
PIF_VALUE: 24
PIF_VALUE: 36
PIF_VALUE: 0
PIF_VALUE: 36
PIF_VALUE: 32
PIF_VALUE: 36
PIF_VALUE: 32
PIF_VALUE: 36
PIF_VALUE: 32
PIF_VALUE: 36
PIF_VALUE: 33
PIF_VALUE: 36
PIF_VALUE: 31
PIF_VALUE: 36
PIF_VALUE: 32
PIF_VALUE: 3
PIF_VALUE: 36
PIF_VALUE: 32
PIF_VALUE: 34
PIF_VALUE: 36
PIF_VALUE: 31
PIF_VALUE: 32
PIF_VALUE: 40
PIF_VALUE: 36
PIF_VALUE: 32
PIF_VALUE: 36
PIF_VALUE: 36
PIF_VALUE: 32
PIF_VALUE: 1
PIF_VALUE: 36
PIF_VALUE: 33
PIF_VALUE: 32
PIF_VALUE: 32
PIF_VALUE: 0
PIF_VALUE: 32
PIF_VALUE: 32
PIF_VALUE: 36
PIF_VALUE: 2
PIF_VALUE: 36
PIF_VALUE: 36
PIF_VALUE: 3
PIF_VALUE: 36
PIF_VALUE: 32
PIF_VALUE: 36
PIF_VALUE: 31
PIF_VALUE: 36
PIF_VALUE: 32
PIF_VALUE: 32
PIF_VALUE: 36
PIF_VALUE: 37
PIF_VALUE: 36
PIF_VALUE: 36
PIF_VALUE: 32
PIF_VALUE: 32
PIF_VALUE: 36
PIF_VALUE: 1
PIF_VALUE: 32
PIF_VALUE: 36
PIF_VALUE: 36
PIF_VALUE: 32
PIF_VALUE: 36
PIF_VALUE: 33
PIF_VALUE: 36
PIF_VALUE: 36
PIF_VALUE: 0
PIF_VALUE: 32
PIF_VALUE: 32
PIF_VALUE: 36
PIF_VALUE: 32
PIF_VALUE: 36
PIF_VALUE: 36
PIF_VALUE: 0
PIF_VALUE: 36
PIF_VALUE: 1
PIF_VALUE: 36
PIF_VALUE: 32
PIF_VALUE: 21
PIF_VALUE: 32
PIF_VALUE: 32
PIF_VALUE: 31
PIF_VALUE: 36
PIF_VALUE: 32
PIF_VALUE: 36
PIF_VALUE: 35
PIF_VALUE: 2
PIF_VALUE: 32
PIF_VALUE: 36
PIF_VALUE: 37
PIF_VALUE: 32
PIF_VALUE: 32
PIF_VALUE: 1
PIF_VALUE: 36
PIF_VALUE: 5
PIF_VALUE: 32
PIF_VALUE: 36
PIF_VALUE: 32
PIF_VALUE: 36
PIF_VALUE: 0
PIF_VALUE: 36
PIF_VALUE: 32
PIF_VALUE: 0
PIF_VALUE: 31
PIF_VALUE: 36
PIF_VALUE: 32
PIF_VALUE: 36
PIF_VALUE: 32
PIF_VALUE: 36
PIF_VALUE: 32
PIF_VALUE: 32
PIF_VALUE: 31
PIF_VALUE: 31
PIF_VALUE: 0
PIF_VALUE: 32
PIF_VALUE: 31
PIF_VALUE: 32
PIF_VALUE: 36
PIF_VALUE: 32
PIF_VALUE: 36
PIF_VALUE: 1
PIF_VALUE: 36
PIF_VALUE: 32
PIF_VALUE: 32
PIF_VALUE: 4
PIF_VALUE: 36
PIF_VALUE: 31
PIF_VALUE: 36
PIF_VALUE: 31
PIF_VALUE: 36
PIF_VALUE: 3
PIF_VALUE: 36
PIF_VALUE: 32
PIF_VALUE: 36
PIF_VALUE: 3
PIF_VALUE: 3
PIF_VALUE: 31
PIF_VALUE: 34
PIF_VALUE: 36
PIF_VALUE: 39
PIF_VALUE: 0
PIF_VALUE: 36

## 2020-09-25 ASSESSMENT — PAIN SCALES - GENERAL
PAINLEVEL_OUTOF10: 9
PAINLEVEL_OUTOF10: 6
PAINLEVEL_OUTOF10: 8
PAINLEVEL_OUTOF10: 10
PAINLEVEL_OUTOF10: 9
PAINLEVEL_OUTOF10: 8
PAINLEVEL_OUTOF10: 6

## 2020-09-25 NOTE — PROGRESS NOTES
09/25/20 1258   NIV Type   $NIV $Daily Charge   Skin Assessment Clean, dry, & intact   Skin Protection for O2 Device Yes   NIV Started/Stopped On   Equipment Type V60   Mode Bilevel   Mask Type Full face mask   Mask Size Large   Settings/Measurements   IPAP 20 cmH20   CPAP/EPAP 8 cmH2O   Resp 18   FiO2  100 %   Vt Exhaled 567 mL   Minute Volume 10.1 Liters   Mask Leak (lpm) 73 lpm   Comfort Level Good   Using Accessory Muscles No   SpO2 98   Alarm Settings   Alarms On Y   Oxygen Therapy/Pulse Ox   SpO2 100 %

## 2020-09-25 NOTE — PROGRESS NOTES
Just received a call from lab regarding pt's coags. They are unable to process sample, Will redraw and send.

## 2020-09-25 NOTE — PROGRESS NOTES
IPAP increased from 22 to 24 due to ABG results. RN at bedside and aware.     Electronically signed by Alexandr Simmons, WILFREDO, RRT, RRT-ACCS on 9/25/20 at 04:30 PM EDT

## 2020-09-25 NOTE — PROGRESS NOTES
Wasted 1mg of Versed with Yaquelin Pinedo.    Electronically signed by Juan Coronel RN on 9/25/2020 at 6:43 AM

## 2020-09-25 NOTE — PROGRESS NOTES
Patient admitted to CVU from [place] [address] and attached to ventilator and monitors. Report received from Dr. Ashish Oh Anesthesiologist. Chest x-ray ordered. Labs drawn and sent. Assessment complete. Hemodymanics stable and will continue to monitor. Primary RN Zoila Contreras.     [address] [place] [address]

## 2020-09-25 NOTE — PROGRESS NOTES
CARDIOLOGY PROGRESS NOTE      Patient Name: Milton Velasquez  Date of admission: 9/22/2020  3:42 PM  Admission Dx: CAD in native artery [I25.10]  Reason for Consult:  Unstable angina  Requesting Physician: Jose Landon MD  Primary Care physician: ANICETO Billingsley CNP    Subjective:     Ryan Caputo a 64 y. o. patient who presented to [facility] with complaints of chest pain. The patient has prior history of congestive heart failure with recent admission, obesity, prior 5 pack per day smoker for 15-20 years, COPD on nocturnal oxygen, OANH non-compliant with CPAP. He was found to have an abnormal stress. He began having rest pain in the afternoon 9/22 consistent with unstable angina. He was inititated on therapies and transferred to [facility] for [facility] which revealed multi-vessel coronary artery disease. This morning the patient underwent three-vessel bypass with Dr. Alex Bey, receiving a LIMA to LAD, SVG to OM and SVG to diagonal.  He was moved to the ICU roughly an hour ago. He has been extubated to an NPPV. He presently complains of pain at the site of his incision and chest tubes. Arterial line blood pressures are running low with latest 83/58 with a map of 67. Currently treated with norepinephrine and vasopressin for pressor support. Additionally, he was started on 5 mcg of dobutamine postoperatively for low cardiac index, with most recent indices 2.3-2.4.  PA pressure 30/20/24 with SVR in the 800s. CVP running 13-16. His wife is at the bedside and was updated on his condition. Questions concerns addressed. Patient is receiving pain relief with morphine presently. Home Medications:  Were reviewed and are listed in nursing record and/or below  Prior to Admission medications    Medication Sig Start Date End Date Taking?  Authorizing Provider   atorvastatin (LIPITOR) 10 MG tablet Take 1 tablet by mouth daily 9/17/20  Yes ANICETO Billingsley CNP furosemide (LASIX) 20 MG tablet TAKE ONE TABLET BY MOUTH EVERY MORNING 9/9/20  Yes ANICETO Dickens CNP   venlafaxine (EFFEXOR XR) 75 MG extended release capsule TAKE ONE CAPSULE BY MOUTH DAILY 8/7/20  Yes ANICETO Dickens CNP   traZODone (DESYREL) 50 MG tablet TAKE ONE TABLET BY MOUTH ONCE NIGHTLY AS NEEDED FOR SLEEP 8/7/20  Yes ANICETO Dickens CNP   potassium chloride (KLOR-CON) 10 MEQ extended release tablet TAKE ONE TABLET BY MOUTH DAILY 7/14/20  Yes ANICETO Cordon CNP   albuterol (PROVENTIL) (2.5 MG/3ML) 0.083% nebulizer solution Take 3 mLs by nebulization every 6 hours as needed for Wheezing or Shortness of Breath 9/25/19  Yes ANICETO Dickens CNP   ipratropium (ATROVENT) 0.02 % nebulizer solution Take 2.5 mLs by nebulization 4 times daily as needed for Wheezing (short of breath) 9/25/19  Yes ANICETO Dickens CNP   albuterol sulfate HFA (PROVENTIL HFA) 108 (90 Base) MCG/ACT inhaler Inhale 2 puffs into the lungs every 6 hours as needed for Wheezing 5/17/19  Yes Shital Cary MD        CURRENT Medications:  protamine injection 50 mg, Once PRN  lactated ringers infusion, Continuous  sodium chloride flush 0.9 % injection 10 mL, 2 times per day  sodium chloride flush 0.9 % injection 10 mL, PRN  potassium chloride 20 mEq/50 mL IVPB (Central Line), PRN  magnesium sulfate 2 g in 50 mL IVPB premix, PRN  calcium chloride 1 g in sodium chloride 0.9 % 100 mL IVPB, PRN  calcium chloride 2 g in sodium chloride 0.9 % 100 mL IVPB, PRN  ceFAZolin (ANCEF) 3 g in dextrose 5 % 100 mL IVPB, Q8H  vancomycin (VANCOCIN) 2,000 mg in dextrose 5 % 500 mL IVPB, Q12H  [START ON 9/26/2020] acetaminophen (TYLENOL) tablet 650 mg, Q6H  oxyCODONE (ROXICODONE) immediate release tablet 5 mg, Q4H PRN    Or  oxyCODONE (ROXICODONE) immediate release tablet 10 mg, Q4H PRN  morphine (PF) injection 2 mg, Q2H PRN    Or  morphine (PF) injection 4 mg, Q2H PRN  ketorolac (TORADOL) injection 15 mg, Q6H  midazolam (VERSED) injection 1 mg, Q1H PRN  [START ON 9/26/2020] diphenhydrAMINE (BENADRYL) tablet 25 mg, Nightly PRN  [START ON 9/26/2020] polyethylene glycol (GLYCOLAX) packet 17 g, Daily  [START ON 9/26/2020] bisacodyl (DULCOLAX) EC tablet 5 mg, Daily  bisacodyl (DULCOLAX) suppository 10 mg, Daily PRN  ondansetron (ZOFRAN) injection 4 mg, Q8H PRN  famotidine (PEPCID) injection 20 mg, BID  [START ON 9/26/2020] metoprolol tartrate (LOPRESSOR) tablet 12.5 mg, BID  chlorhexidine (PERIDEX) 0.12 % solution 15 mL, BID  [START ON 9/26/2020] digoxin (LANOXIN) tablet 125 mcg, Daily  [START ON 9/26/2020] furosemide (LASIX) injection 40 mg, BID  [START ON 9/26/2020] magnesium oxide (MAG-OX) tablet 400 mg, Daily  [START ON 9/26/2020] nitroGLYCERIN (NITRODUR) 0.2 MG/HR 1 patch, Daily  [START ON 9/26/2020] potassium chloride (KLOR-CON M) extended release tablet 10 mEq, TID WC  sodium bicarbonate 8.4 % injection 50 mEq, Q30 Min PRN  [START ON 9/26/2020] fondaparinux (ARIXTRA) injection 2.5 mg, Daily  [START ON 9/26/2020] aspirin EC tablet 81 mg, Daily  aspirin suppository 300 mg, Once  meperidine (DEMEROL) injection 25 mg, Once PRN  albuterol sulfate  (90 Base) MCG/ACT inhaler 2 puff, Q6H PRN  albumin human 5 % IV solution 25 g, PRN  lactated ringers bolus, Continuous PRN  DOBUTamine (DOBUTREX) 500 mg in dextrose 5 % 250 mL infusion, Continuous PRN  phenylephrine (CARL-SYNEPHRINE) 50 mg in dextrose 5 % 250 mL infusion, Continuous PRN  furosemide (LASIX) injection 40 mg, Once PRN  milrinone (PRIMACOR) 20 mg in dextrose 5 % 100 mL infusion, Continuous  norepinephrine (LEVOPHED) 16 mg in dextrose 5 % 250 mL infusion, Continuous PRN  EPINEPHrine (EPINEPHrine HCL) 5 mg in dextrose 5 % 250 mL infusion, Continuous PRN  nitroGLYCERIN 50 mg in dextrose 5% 250 mL infusion, Continuous PRN  insulin regular (HUMULIN R;NOVOLIN R) 100 Units in sodium chloride 0.9 % 100 mL infusion, Titrated  [START ON 9/26/2020] insulin glargine (LANTUS) injection vial 19 Units, Nightly  [START ON 9/27/2020] insulin lispro (HUMALOG) injection vial 0-6 Units, TID WC  [START ON 9/27/2020] insulin lispro (HUMALOG) injection vial 0-3 Units, Nightly  glucose (GLUTOSE) 40 % oral gel 15 g, PRN  dextrose 50 % IV solution, PRN  glucagon (rDNA) injection 1 mg, PRN  dextrose 5 % solution, PRN  DOBUTamine (DOBUTREX) 500 mg in dextrose 5 % 250 mL infusion, Continuous  vasopressin 20 Units in dextrose 5 % 100 mL infusion, Continuous  mupirocin (BACTROBAN) 2 % ointment, BID  melatonin tablet 5 mg, Nightly PRN  acetaminophen (TYLENOL) tablet 650 mg, Q6H PRN    Or  acetaminophen (TYLENOL) suppository 650 mg, Q6H PRN  albuterol sulfate  (90 Base) MCG/ACT inhaler 2 puff, Q6H PRN  atorvastatin (LIPITOR) tablet 80 mg, Nightly  traZODone (DESYREL) tablet 50 mg, Nightly PRN  venlafaxine (EFFEXOR XR) extended release capsule 75 mg, Daily with breakfast  budesonide-formoterol (SYMBICORT) 160-4.5 MCG/ACT inhaler 2 puff, BID  ipratropium-albuterol (DUONEB) nebulizer solution 1 ampule, Q4H PRN        Allergies:  Codeine and Dilaudid [hydromorphone hcl]     Review of Systems:   A 14 point review of symptoms completed. Pertinent positives identified in the HPI, all other review of symptoms negative. Objective:     Vitals:    09/25/20 0430 09/25/20 0454 09/25/20 0457 09/25/20 1258   BP:  119/71 115/78    Pulse:  88     Resp:  18  18   Temp:  98 °F (36.7 °C)     TempSrc:  Oral     SpO2:  94%  100%   Weight: 278 lb (126.1 kg)      Height:          Weight: 278 lb (126.1 kg)       PHYSICAL EXAM:    General:   Sleepy but arousable, mild distress, complains of pain inside of his incision. Head:  Normocephalic, atraumatic   Eyes:  Conjunctiva/corneas clear, anicteric sclerae    Nose: Nares normal, no drainage or sinus tenderness   Throat: No abnormalities of the lips, oral mucosa or tongue. Neck: Trachea midline.  Neck supple with no lymphadenopathy, thyroid not enlarged, symmetric, no tenderness/mass/nodules. Central venous line right IJ. Lungs:    Diminished breath sounds at the bases anteriorly, no wheezes no rales. He is on noninvasive positive pressure ventilation currently. Chest Wall:   Incision dressed, wires in place. Heart:   Distant heart sounds, regular rate and rhythm, normal S1, normal S2, no murmur, no S3/S4, PMI non-palpable. Abdomen:    Obese, soft, non-tender, with quiet bowel sounds. No masses, no hepatosplenomegaly   Extremities: No cyanosis, clubbing or pitting edema. Vascular: 2+ radial, brachial, femoral, dorsalis pedis and posterior tibial pulses bilaterally. Brisk carotid upstrokes without carotid bruit. Skin: Skin color, texture, turgor are normal with no rashes or ulceration. Pysch: Euthymic mood, appropriate affect   Neurologic:  Drowsy, but arousable. Full neurologic exam precluded. Labs:   CBC:   Lab Results   Component Value Date    WBC 41.6 09/25/2020    RBC 4.68 09/25/2020    HGB 13.8 09/25/2020    HCT 42.6 09/25/2020    MCV 91.2 09/25/2020    RDW 15.0 09/25/2020     09/25/2020     CMP:  Lab Results   Component Value Date     09/25/2020    K 4.3 09/25/2020    K 4.5 09/19/2020     09/25/2020    CO2 24 09/25/2020    BUN 16 09/25/2020    CREATININE 0.9 09/25/2020    GFRAA >60 09/25/2020    GFRAA >60 03/16/2011    AGRATIO 1.3 09/19/2020    LABGLOM >60 09/25/2020    GLUCOSE 108 09/25/2020    PROT 6.5 09/25/2020    PROT 6.9 03/16/2011    CALCIUM 8.6 09/25/2020    BILITOT 1.0 09/25/2020    ALKPHOS 66 09/25/2020    AST 22 09/25/2020    ALT 55 09/25/2020     PT/INR:  No results found for: PTINR  HgBA1c:  Lab Results   Component Value Date    LABA1C 6.0 09/25/2020     Lab Results   Component Value Date    TROPONINI <0.01 09/18/2020         Interval Testing/Data:     Telemetry personally reviewed, sinus rhythm with heart rate in the 90s, occasional PVCs and couplets. No NSVT. No pacing.     Impression disease), lumbar    Lumbar spine pain    PNA (pneumonia)    Pneumonia    Acute respiratory distress    Class 3 severe obesity with body mass index (BMI) of 45.0 to 49.9 in adult [facility])    Pneumonia, primary atypical    Acute respiratory failure with hypoxia (HCC)    Moderate protein-calorie malnutrition (HCC)    Acute respiratory failure (HCC)    Acute on chronic respiratory failure with hypoxemia (HCC)    Acute diastolic congestive heart failure (HCC)    Chronic diastolic congestive heart failure (HCC)    Hyperlipidemia    Essential hypertension    Insomnia    Morbid obesity with BMI of 45.0-49.9, adult ([facility] [address]    Unstable angina (HCC)    Abnormal cardiovascular stress test    CAD in native artery           I will address the patient's cardiac risk factors and adjusted pharmacologic treatment as needed. In addition, I have reinforced the need for patient directed risk factor modification. All questions and concerns were addressed to the patient/family. Alternatives to my treatment were discussed. Thank you for allowing us to participate in the care of Austen Lizarraga. Please call me with any questions [phone].     Juan Guillory MD   Cardiovascular Disease  [facility], [place], operated by [facility]  [phone] [facility]  [phone] [address] [place] [address]  9/25/2020 1:55 PM

## 2020-09-25 NOTE — OP NOTE
evaluation at [facility] and  was transferred to [facility], where he underwent stress testing. Stress test was found to be abnormal and referred for cardiac  catheterization to [facility].  Here, left heart  catheterization was completed showing high-grade possible LAD diagonals  and totally occluded obtuse marginal branch of the circumflex. The  disease was not amenable to PCI and referred for surgical evaluation. I  saw the patient in consultation, examined the patient, reviewed his  imaging studies and recommended coronary artery bypass grafting surgery  for him. I discussed this operation with him. Discussed the risks,  benefits and alternatives, including the risk of infection, bleeding,  stroke, MI, arrhythmias and operative mortality risk in the 2% to 3%  range. Questions were answered and consent was obtained to proceed with  surgery. INTRAOPERATIVE FINDINGS:  Left ventricular function was well preserved. He came off bypass with a low dose inotropic support. Transesophageal  echo placed by Anesthesia, was also reviewed by myself preprocedurally  showed trivial mitral regurg. No aortic insufficiency. No interatrial  septal defect. No clot in the left atrial appendage and ejection  fraction in the 35% to 40% range. Postprocedural EBONY showed the same  structural findings, trivial mitral regurg. No other structural changes  with the global systolic function in the 04% to 60% range. His coronary  arteries at the point of anastomosis measured 1.75 mm to 2 mm in  diameter. Vein graft utilized was of very good quality, uniform in  diameter throughout its length. NICO had an excellent pulsatile flow. No calcific wall disease. Pump time was 100 minutes. Cross-clamp time was 87 minutes. Coldest  core temperature  about 34 degree centigrade. Ancef and vancomycin were  both administered prior to  incision. Amicar was administered.     OPERATIVE PROCEDURE:  The patient was brought to the operating room and  placed on the operating room table in the supine position. The right  brachial arterial monitoring lines were placed. General endotracheal  anesthesia was accomplished. Ribeiro catheter was placed. The right  internal jugular [place]-Augusto catheter was successfully positioned. The  chest, abdomen, groin and legs were all prepped and draped in the usual  sterile fashion for an open heart surgery. Time-out process was carried  out appropriately identifying the patient and the procedure. The heart was exposed through a median sternotomy incision while portion  of the right greater saphenous vein was harvested using endoscopic vein  harvest technique. This was accomplished successfully. A [place]  drain was placed and the leg was closed. Simultaneously, the left  internal mammary was taken down from its position underneath the sternum  with electrocautery. Systemic heparin was administered. Distal end of  the NICO clipped with surgical clips, divided and the stump oversewn with  Ethibond suture, and the pedicle placed in the left upper chest required  for bypass. The pericardium was opened longitudinally and tacked up laterally  creating a pericardial cradle. The ascending aorta was inspected with  the epiaortic ultrasound, which I independently performed and  interpreted based on my surgical plans on. I saw no atherosclerotic  disease. The ascending aorta was cannulated for arterial return from  the pump. No retrograde cardioplegia was placed today because of  anatomy. Three-stage single venous cannula was placed through the right  atrial appendage into the inferior vena cava. The patient was placed on  bypass. A needle vent was placed in the ascending aorta and secured  with a pledgeted pursestring.     The method of myocardial protection employed was cold blood  cardioplegia, given antegrade to achieve cardiac standstill in 15 minute  to 20 minute intervals and/or between these anastomoses. Antegrade  cardioplegia was administered along with vein graft cardioplegia,  checked anastomosis for hemostasis. At the conclusion of all the  grafting, a hot shot dose of cardioplegia was administered 500 mL, all  antegrade. Cross-clamp was applied. Cardioplegia was administered. Rapid cardiac  standstill was achieved. First grafting done was the obtuse marginal  branch of the circumflex. The vein and coronary were prepared and two  vessels joined end-to-side with a 7-0 Prolene suture in a running  fashion. The anastomosis was checked for hemostasis and found to be  satisfactory. Antegrade and vein graft cardioplegia administered,  checked anastomosis for hemostasis. The vein was brought to the left  side of the aorta, which distended with cardioplegia. The vein graft  was trimmed for length. Second grafting done was the diagonal branch of the LAD. Separate vein  and coronary were prepared. Two vessels joined end-to-side with a 7-0  Prolene suture in a running fashion. The anastomosis was checked for  hemostasis and found to be satisfactory. Antegrade and vein graft  cardioplegia administered. The NICO was brought to the field through a  pericardial window behind the left lateral thymic fat pad. The LAD was  dissected and opened longitudinally. The NICO was trimmed for length and  prepared distally and the two vessels joined end-to-side with a 7-0  Prolene suture in a running fashion. Bulldog was removed from the  pedicle, checked anastomosis for hemostasis, found to be satisfactory. Bulldog was reapplied. The side branches were clipped and the pedicle  was secured to the myocardium with a 5-0 Prolene suture to prevent  twisting and torquing at the anastomotic site. During this time frame, the patient was systemically rewarmed. While  remaining at proximal anastomosis, completed the ascending aorta to trim  both veins for appropriate length.   Aortotomy was created using a 4.4 mm  punch and the proximal anastomosis was completed with a 6-0 Prolene  suture in a running fashion. The patient was placed in Trendelenburg  position. A hot shot dose of cardioplegia administered, 500 mL, all  antegrade and cross-clamp was removed. The sinus rhythm did return  spontaneously. No cardioversion required. All anastomoses were checked  for hemostasis and all found to be satisfactory. Temporary epicardial pacing wires were placed, two on the surface of the  right ventricle and two at the anterior groove, brought out through the  skin and secured with the Ethibond suture. No pacing required at this  time. Two 32-[place]ine[place] chest tubes were placed, one in the posterior  pericardium, one in the diaphragm, and the other in the left pleural  cavity, all secured with the Ethibond suture and connected to suction  drainage device. At this point with satisfactory hemostasis, the patient was fully  rewarmed. He was ventilated. He was  from bypass. He was  accomplished with low dose milrinone and Levophed. He was taken off of  bypass. The venous cannula was removed and the pursestring suture  securing it was tied and reinforced with a pledgeted 3-0 Prolene suture  in a figure-of-eight fashion. The needle vent was removed from the ascending aorta and the pursestring  suture securing it was tied. The heparin effect was then reversed with  protamine, returning ACT towards its baseline. As I did that, the  aortic cannula was removed and the pursestring suture securing it was  tied x2 and reinforced with a pledgeted 3-0 Prolene suture placed in a  horizontal mattress fashion. At this point, I continued to work on hemostasis until it was found to  be satisfactory for closure. Doppler was used to check each of the grafts and all found to have  satisfactory systolic and diastolic signals.     The pericardium was reapproximated over the ascending aorta as well as  over the base of the heart and the diaphragm. Platelet-poor gel was  placed inside the pericardium as well as underneath the left chest wall  and the NICO bed. Platelet-rich gel between the sternum as it was  closed. The sternum was reapproximated with two stainless steel wires  and then four plates. One 8-hole plate in the manubrium, two 8-hole  plates in the body of the sternum, and one 4-hole plate just above the  xiphoid process. All secured with appropriate size screws. The sternal  wound was irrigated with copious amounts of warm saline and closed in  appropriate layers with Vicryl suture including subcuticular stitch in  the skin. ESTIMATED BLOOD LOSS:  100 mL. REPLACEMENTS:  None. COUNTS:  Sponge and needle counts were correct x2. BHAVYA Marmolejo MD    D: 09/25/2020 12:16:30       T: 09/25/2020 15:36:38     [email or web address]  Job#: [id number]     Doc#: [id number]    CC:  MD Alex Sotelo.  Allen Chen MD       Primary Care Physician       Ansley Narvaez MD

## 2020-09-25 NOTE — ANESTHESIA POSTPROCEDURE EVALUATION
Department of Anesthesiology  Postprocedure Note    Patient: Lisa Mckinney  MRN: [id number]  YOB: 1960  Date of evaluation: 9/25/2020  Time:  12:45 PM     Procedure Summary     Date:  09/25/20 Room / Location:  [place] [address] / [facility]    Anesthesia Start:  6192 Anesthesia Stop:  7301    Procedure:  CORONARY ARTERY BYPASS GRAFTING X3, INTERNAL MAMMARY ARTERY, SAPHENOUS VEIN GRAFT, ON PUMP, STERNAL PLATING, 5 LEVEL BILATERAL INTERCOSTAL NERVE BLOCK, PLATELET GEL APPLICATION (N/A Chest) Diagnosis:  (-)    Surgeon:  Christen Brady MD Responsible Provider:  Emmett Sanders MD    Anesthesia Type:  general ASA Status:  4          Anesthesia Type: general    Adele Phase I:      Adele Phase II:      Last vitals: Reviewed and per EMR flowsheets. Anesthesia Post Evaluation    Patient location during evaluation: ICU  Patient participation: waiting for patient participation  Pain scale: see nsg note.   Airway patency: patent  Nausea & Vomiting: no vomiting and no nausea  Complications: no  Cardiovascular status: hemodynamically unstable and vasoactive/inotropes  Respiratory status: BIPAP  Hydration status: hypovolemic

## 2020-09-25 NOTE — BRIEF OP NOTE
Brief Postoperative Note      Patient: Marycarmen Pozo  YOB: 1960  MRN: [id number]  Date of Procedure: 9/25/2020    Pre-Op Diagnosis: CAD. Silent myocardial ischemia. Dyslipidemia. Tobacco abuse disorder. Super morbid obesity. Sleep apnea. COPD Night time O2 dependent. Anxiety/depression disorder. Post-Op Diagnosis: same        Procedures:   Urgent CABG X 3, with pedicled LIMA to LAD, single Greater Saphenous VG to OM 1, separate single Greater SVG to diag 1, SGC, CPB, EVH Right Greater Saphenous vein, EBONY, Epiaortic ultrasound, Doppler verification of grafts, Bilateral 5 level intercostal nerve block(Exparel), Platelet gel application. Surgeon(s):  Jefe Cui MD    Assistants: Alta Akers SA, SA Alvin    Anesthesia: General ET/ MD Yuri    Estimated Blood Loss (mL): 100   ml    Replacements:  none    Pump time:  100 min    Cross-clamp time:  87 min    Findings: normal ascending aorta. Trivial MR    Complications: None    Specimens:   * No specimens in log *    Implants:  Implant Name Type Inv. Item Serial No.  Lot No. LRB No. Used Action   PLATE X STERNALOCK 8 HL Screw/Plate/Nail/[place] PLATE X STERNALOCK 8 HL  [employer]  N/A 2 Implanted   PLATE L STERNAL LK 4 HL Screw/Plate/Nail/[place] PLATE L STERNAL LK 4 HL  [employer]  N/A 1 Implanted   PLATE JL 8 HL Screw/Plate/Nail/[place] PLATE JL 8 HL  [employer]  N/A 1 Implanted   SCREW STERNALOCK 2.4X18MM Screw/Plate/Nail/[place] SCREW STERNALOCK 2.4X18MM  [employer]  N/A 12 Implanted   SCREW STERNALOCK 2. [id number] Screw/Plate/Nail/[place] SCREW STERNALOCK 2. [id number]  [employer]  N/A 16 Implanted         Drains:   Chest Tube 1 Mediastinal 32 Iranian (Active)       Chest Tube 2 Pleural 32 Iranian (Active)       Closed/Suction Drain Right Leg Bulb 7 Iranian (Active)       Urethral Catheter Double-lumen; Latex;Straight-tip; Temperature probe 16 fr (Active)       Jefe Cui MD   Date: 9/25/2020  Time: 12:03 PM   [id number]

## 2020-09-25 NOTE — PLAN OF CARE
Problem: Falls - Risk of:  Goal: Will remain free from falls  Description: Will remain free from falls  Outcome: Ongoing  Pt remains free of falls. Bed in lowest position with call light and bedside table within reach.    Goal: Absence of physical injury  Description: Absence of physical injury  Outcome: Ongoing

## 2020-09-25 NOTE — PROCEDURES
[address] [address]                               PULMONARY FUNCTION    PATIENT NAME: Gaby Bhandari                    :        1960  MED REC NO:   [id number]                          ROOM:       2961  ACCOUNT NO:   [de-identified]                           ADMIT DATE: 2020  PROVIDER:     Kavin Hernandez MD    DATE OF PROCEDURE:  2020    PFT INTERPRETATION    The patient is a 53-year-old male who is undergoing a PFT for preop open  heart surgery. Spirometry shows FVC to be 58%, FEV1 to be 66%, FEV1 to  FVC ratio was 98%, SSW23-37% was 62%. The patient did not have any  significant postbronchodilator improvement on the study. Lung volume  shows the total lung capacity was normal.  The patient has some air  trapping. The patient has decrease in ERV which may be because of body  habitus. The patient's diffusion capacity when adjusted flow volume was  normal.  The patient's flow volume loop was normal.  On the basis of  this PFT, the patient has mild-to-moderate obstructive airway disease  with some changes in the PFT parameters because of body habitus. Please  correlate clinically.         Artur Duncan MD    D: 2020 15:15:53       T: 2020 15:24:23     [id number]  Job#: [id number]     Doc#: [id number]    CC:

## 2020-09-25 NOTE — PROGRESS NOTES
Pt is S/P Urgent coronary artery bypass grafting surgery x3 with a single greater saphenous vein graft to the obtuse marginal branch of the circumflex, separate single greater saphenous vein graft to the first diagonal branch of the LAD, left internal artery to the LAD. Pt has CT x2, Ribeiro, A Line and SWAN patent and transducing WNL. Platelet gel application. Pt currently infusing Vaso, Levo Dobutamine and Insulin gtts. Pt removed from BiPap and N/C at 3L in place. Pt C/O Surgical CP and pain R/T CT placement. Pt is AOx3 and repositioned for comfort as well as possible. Will continue to monitor.

## 2020-09-25 NOTE — PROGRESS NOTES
Physician Progress Note      PATIENT:               Deshaun Buchanan  CSN #:                  [id number]  :                       1960  ADMIT DATE:       2020 9:06 AM  DISCH DATE:        2020 3:12 PM  RESPONDING  PROVIDER #:        Yesi Magana MD          QUERY TEXT:    Dear Dr. Dalia Cardenas,  Liz Katient admitted with LEE and chest pain. Noted documentation of Unstable   angina/angina at rest by the cardiology consultant on . Patient with   history of CAD. If possible, please document in progress notes and discharge   summary:    The medical record reflects the following:  Risk Factors: cad, htn, copd,  Hyperlipidemia  Clinical Indicators: abnormal stress test, Unstable angina/angina at rest per   cardiology  Treatment: stress test, transfer to [facility] for heart cath, cardiology consult,   heparin gtt    Thank you for your assistance,  Margy Navarro RN,BSN,CCDS,CRCR  Options provided:  -- LEE/chest pain due to Unstable angina confirmed POA in patient with CAD  -- Unstable angina ruled out  -- Defer to cardiology consultant documentation regarding unstable angina in   pt with CAD  -- Other - I will add my own diagnosis  -- Disagree - Not applicable / Not valid  -- Disagree - Clinically unable to determine / Unknown  -- Refer to Clinical Documentation Reviewer    PROVIDER RESPONSE TEXT:    LEE/chest pain due to unstable angina was confirmed as POA in pt with CAD.     Query created by: Felice Sutherland on 2020 6:44 AM      Electronically signed by:  Yesi Magana MD 2020 7:02 AM

## 2020-09-25 NOTE — PROGRESS NOTES
2 OR for coronary artery bypass graft as planned. Hospitalist service signing off, transferring to cardiothoracic surgery      We will be happy to see the patient if we are needed. Please consult if necessary.       Electronically signed by George Hernandez MD on 9/25/2020 at 8:44 AM

## 2020-09-26 LAB
ANION GAP SERPL CALCULATED.3IONS-SCNC: 8 MMOL/L (ref 3–16)
BUN BLDV-MCNC: 13 MG/DL (ref 7–20)
CALCIUM SERPL-MCNC: 8.1 MG/DL (ref 8.3–10.6)
CHLORIDE BLD-SCNC: 105 MMOL/L (ref 99–110)
CO2: 25 MMOL/L (ref 21–32)
CREAT SERPL-MCNC: 0.9 MG/DL (ref 0.9–1.3)
GFR AFRICAN AMERICAN: >60
GFR NON-AFRICAN AMERICAN: >60
GLUCOSE BLD-MCNC: 113 MG/DL (ref 70–99)
GLUCOSE BLD-MCNC: 121 MG/DL (ref 70–99)
GLUCOSE BLD-MCNC: 139 MG/DL (ref 70–99)
GLUCOSE BLD-MCNC: 146 MG/DL (ref 70–99)
GLUCOSE BLD-MCNC: 155 MG/DL (ref 70–99)
GLUCOSE BLD-MCNC: 163 MG/DL (ref 70–99)
GLUCOSE BLD-MCNC: 187 MG/DL (ref 70–99)
HCT VFR BLD CALC: 39 % (ref 40.5–52.5)
HEMOGLOBIN: 12.8 G/DL (ref 13.5–17.5)
INR BLD: 1.35 (ref 0.86–1.14)
MCH RBC QN AUTO: 29.6 PG (ref 26–34)
MCHC RBC AUTO-ENTMCNC: 32.7 G/DL (ref 31–36)
MCV RBC AUTO: 90.4 FL (ref 80–100)
PDW BLD-RTO: 15.3 % (ref 12.4–15.4)
PERFORMED ON: ABNORMAL
PLATELET # BLD: 117 K/UL (ref 135–450)
PMV BLD AUTO: 8.2 FL (ref 5–10.5)
POTASSIUM SERPL-SCNC: 4.6 MMOL/L (ref 3.5–5.1)
PROTHROMBIN TIME: 15.7 SEC (ref 10–13.2)
RBC # BLD: 4.32 M/UL (ref 4.2–5.9)
SODIUM BLD-SCNC: 138 MMOL/L (ref 136–145)
WBC # BLD: 21.6 K/UL (ref 4–11)

## 2020-09-26 PROCEDURE — 6370000000 HC RX 637 (ALT 250 FOR IP): Performed by: THORACIC SURGERY (CARDIOTHORACIC VASCULAR SURGERY)

## 2020-09-26 PROCEDURE — P9045 ALBUMIN (HUMAN), 5%, 250 ML: HCPCS | Performed by: THORACIC SURGERY (CARDIOTHORACIC VASCULAR SURGERY)

## 2020-09-26 PROCEDURE — 94761 N-INVAS EAR/PLS OXIMETRY MLT: CPT

## 2020-09-26 PROCEDURE — 85027 COMPLETE CBC AUTOMATED: CPT

## 2020-09-26 PROCEDURE — 6360000002 HC RX W HCPCS: Performed by: THORACIC SURGERY (CARDIOTHORACIC VASCULAR SURGERY)

## 2020-09-26 PROCEDURE — 2580000003 HC RX 258: Performed by: THORACIC SURGERY (CARDIOTHORACIC VASCULAR SURGERY)

## 2020-09-26 PROCEDURE — 94669 MECHANICAL CHEST WALL OSCILL: CPT

## 2020-09-26 PROCEDURE — 2700000000 HC OXYGEN THERAPY PER DAY

## 2020-09-26 PROCEDURE — 99232 SBSQ HOSP IP/OBS MODERATE 35: CPT | Performed by: INTERNAL MEDICINE

## 2020-09-26 PROCEDURE — 2100000000 HC CCU R&B

## 2020-09-26 PROCEDURE — 2500000003 HC RX 250 WO HCPCS: Performed by: THORACIC SURGERY (CARDIOTHORACIC VASCULAR SURGERY)

## 2020-09-26 PROCEDURE — 80048 BASIC METABOLIC PNL TOTAL CA: CPT

## 2020-09-26 PROCEDURE — 94640 AIRWAY INHALATION TREATMENT: CPT

## 2020-09-26 PROCEDURE — 85610 PROTHROMBIN TIME: CPT

## 2020-09-26 PROCEDURE — 99024 POSTOP FOLLOW-UP VISIT: CPT | Performed by: THORACIC SURGERY (CARDIOTHORACIC VASCULAR SURGERY)

## 2020-09-26 RX ORDER — MORPHINE SULFATE 2 MG/ML
INJECTION, SOLUTION INTRAMUSCULAR; INTRAVENOUS
Status: DISPENSED
Start: 2020-09-26 | End: 2020-09-26

## 2020-09-26 RX ADMIN — ALBUMIN (HUMAN) 25 G: 12.5 INJECTION, SOLUTION INTRAVENOUS at 05:09

## 2020-09-26 RX ADMIN — OXYCODONE 10 MG: 5 TABLET ORAL at 17:06

## 2020-09-26 RX ADMIN — VANCOMYCIN HYDROCHLORIDE 2000 MG: 1 INJECTION, POWDER, LYOPHILIZED, FOR SOLUTION INTRAVENOUS at 21:11

## 2020-09-26 RX ADMIN — KETOROLAC TROMETHAMINE 15 MG: 30 INJECTION, SOLUTION INTRAMUSCULAR at 12:10

## 2020-09-26 RX ADMIN — OXYCODONE 10 MG: 5 TABLET ORAL at 21:46

## 2020-09-26 RX ADMIN — ASPIRIN 81 MG: 81 TABLET ORAL at 07:34

## 2020-09-26 RX ADMIN — MAGNESIUM GLUCONATE 500 MG ORAL TABLET 400 MG: 500 TABLET ORAL at 08:04

## 2020-09-26 RX ADMIN — Medication 15 ML: at 08:11

## 2020-09-26 RX ADMIN — SODIUM CHLORIDE, PRESERVATIVE FREE 10 ML: 5 INJECTION INTRAVENOUS at 07:35

## 2020-09-26 RX ADMIN — POTASSIUM CHLORIDE 10 MEQ: 750 TABLET, EXTENDED RELEASE ORAL at 12:11

## 2020-09-26 RX ADMIN — MORPHINE SULFATE 4 MG: 4 INJECTION, SOLUTION INTRAMUSCULAR; INTRAVENOUS at 08:10

## 2020-09-26 RX ADMIN — DOBUTAMINE HYDROCHLORIDE 3 MCG/KG/MIN: 200 INJECTION INTRAVENOUS at 13:49

## 2020-09-26 RX ADMIN — KETOROLAC TROMETHAMINE 15 MG: 30 INJECTION, SOLUTION INTRAMUSCULAR at 00:38

## 2020-09-26 RX ADMIN — Medication 15 ML: at 21:11

## 2020-09-26 RX ADMIN — MORPHINE SULFATE 4 MG: 4 INJECTION, SOLUTION INTRAMUSCULAR; INTRAVENOUS at 17:08

## 2020-09-26 RX ADMIN — CEFAZOLIN SODIUM 3 G: 10 INJECTION, POWDER, FOR SOLUTION INTRAVENOUS at 03:00

## 2020-09-26 RX ADMIN — CEFAZOLIN SODIUM 3 G: 10 INJECTION, POWDER, FOR SOLUTION INTRAVENOUS at 19:05

## 2020-09-26 RX ADMIN — ACETAMINOPHEN 650 MG: 325 TABLET ORAL at 07:34

## 2020-09-26 RX ADMIN — Medication 2 PUFF: at 08:33

## 2020-09-26 RX ADMIN — FAMOTIDINE 20 MG: 10 INJECTION INTRAVENOUS at 21:11

## 2020-09-26 RX ADMIN — ACETAMINOPHEN 650 MG: 325 TABLET, FILM COATED ORAL at 13:57

## 2020-09-26 RX ADMIN — MUPIROCIN: 20 OINTMENT TOPICAL at 08:13

## 2020-09-26 RX ADMIN — MUPIROCIN: 20 OINTMENT TOPICAL at 21:11

## 2020-09-26 RX ADMIN — DEXTROSE 16 MCG/MIN: 5 SOLUTION INTRAVENOUS at 02:45

## 2020-09-26 RX ADMIN — KETOROLAC TROMETHAMINE 15 MG: 30 INJECTION, SOLUTION INTRAMUSCULAR at 07:16

## 2020-09-26 RX ADMIN — VANCOMYCIN HYDROCHLORIDE 2000 MG: 1 INJECTION, POWDER, LYOPHILIZED, FOR SOLUTION INTRAVENOUS at 07:16

## 2020-09-26 RX ADMIN — BISACODYL 5 MG: 5 TABLET, COATED ORAL at 07:34

## 2020-09-26 RX ADMIN — VASOPRESSIN 0.02 UNITS/MIN: 20 INJECTION INTRAVENOUS at 01:45

## 2020-09-26 RX ADMIN — CEFAZOLIN SODIUM 3 G: 10 INJECTION, POWDER, FOR SOLUTION INTRAVENOUS at 10:11

## 2020-09-26 RX ADMIN — FAMOTIDINE 20 MG: 10 INJECTION INTRAVENOUS at 07:35

## 2020-09-26 RX ADMIN — OXYCODONE 10 MG: 5 TABLET ORAL at 07:34

## 2020-09-26 RX ADMIN — OXYCODONE 10 MG: 5 TABLET ORAL at 12:11

## 2020-09-26 RX ADMIN — INSULIN GLARGINE 19 UNITS: 100 INJECTION, SOLUTION SUBCUTANEOUS at 21:11

## 2020-09-26 RX ADMIN — MORPHINE SULFATE 4 MG: 4 INJECTION, SOLUTION INTRAMUSCULAR; INTRAVENOUS at 05:07

## 2020-09-26 RX ADMIN — DIGOXIN 125 MCG: 125 TABLET ORAL at 07:34

## 2020-09-26 RX ADMIN — FONDAPARINUX SODIUM 2.5 MG: 2.5 INJECTION, SOLUTION SUBCUTANEOUS at 07:34

## 2020-09-26 RX ADMIN — ATORVASTATIN CALCIUM 80 MG: 80 TABLET, FILM COATED ORAL at 21:11

## 2020-09-26 RX ADMIN — FUROSEMIDE 40 MG: 10 INJECTION, SOLUTION INTRAMUSCULAR; INTRAVENOUS at 17:07

## 2020-09-26 RX ADMIN — POLYETHYLENE GLYCOL 3350 17 G: 17 POWDER, FOR SOLUTION ORAL at 07:31

## 2020-09-26 RX ADMIN — SODIUM CHLORIDE, PRESERVATIVE FREE 10 ML: 5 INJECTION INTRAVENOUS at 21:11

## 2020-09-26 RX ADMIN — Medication 2 PUFF: at 20:54

## 2020-09-26 RX ADMIN — POTASSIUM CHLORIDE 10 MEQ: 750 TABLET, EXTENDED RELEASE ORAL at 17:08

## 2020-09-26 RX ADMIN — ACETAMINOPHEN 650 MG: 325 TABLET, FILM COATED ORAL at 19:05

## 2020-09-26 RX ADMIN — KETOROLAC TROMETHAMINE 15 MG: 30 INJECTION, SOLUTION INTRAMUSCULAR at 19:06

## 2020-09-26 RX ADMIN — FUROSEMIDE 40 MG: 10 INJECTION, SOLUTION INTRAMUSCULAR; INTRAVENOUS at 07:33

## 2020-09-26 RX ADMIN — VENLAFAXINE HYDROCHLORIDE 75 MG: 37.5 CAPSULE, EXTENDED RELEASE ORAL at 07:16

## 2020-09-26 ASSESSMENT — PAIN DESCRIPTION - PAIN TYPE
TYPE: CHRONIC PAIN;SURGICAL PAIN
TYPE: CHRONIC PAIN;SURGICAL PAIN

## 2020-09-26 ASSESSMENT — PAIN SCALES - GENERAL
PAINLEVEL_OUTOF10: 6
PAINLEVEL_OUTOF10: 4
PAINLEVEL_OUTOF10: 9
PAINLEVEL_OUTOF10: 9
PAINLEVEL_OUTOF10: 4
PAINLEVEL_OUTOF10: 9
PAINLEVEL_OUTOF10: 4
PAINLEVEL_OUTOF10: 8
PAINLEVEL_OUTOF10: 4
PAINLEVEL_OUTOF10: 10
PAINLEVEL_OUTOF10: 4
PAINLEVEL_OUTOF10: 10
PAINLEVEL_OUTOF10: 9
PAINLEVEL_OUTOF10: 9
PAINLEVEL_OUTOF10: 4
PAINLEVEL_OUTOF10: 10
PAINLEVEL_OUTOF10: 6
PAINLEVEL_OUTOF10: 10
PAINLEVEL_OUTOF10: 9

## 2020-09-26 ASSESSMENT — PAIN DESCRIPTION - LOCATION: LOCATION: BACK;RIB CAGE;STERNUM

## 2020-09-26 ASSESSMENT — PAIN DESCRIPTION - ONSET
ONSET: ON-GOING
ONSET: ON-GOING

## 2020-09-26 ASSESSMENT — PAIN DESCRIPTION - DESCRIPTORS
DESCRIPTORS: ACHING;CRAMPING;DISCOMFORT;JABBING
DESCRIPTORS: ACHING;CRAMPING;DISCOMFORT

## 2020-09-26 ASSESSMENT — PAIN DESCRIPTION - FREQUENCY
FREQUENCY: CONTINUOUS
FREQUENCY: CONTINUOUS

## 2020-09-26 NOTE — PROGRESS NOTES
Physical Therapy/ Occupational Therapy  Attempted PT/ OT evaluations this date. Pt not yet transitioned. Will attempt at later date/time as schedule allows.   Saskia Silveira, PT  Flako Sterling, OTR/L

## 2020-09-26 NOTE — PROGRESS NOTES
4 Eyes Skin Assessment     The patient is being assess for   Shift Handoff    I agree that 2 RN's have performed a thorough Head to Toe Skin Assessment on the patient. ALL assessment sites listed below have been assessed. Areas assessed by both nurses:   [x]   Head, Face, and Ears   [x]   Shoulders, Back, and Chest, Abdomen  [x]   Arms, Elbows, and Hands   [x]   Coccyx, Sacrum, and Ischium  [x]   Legs, Feet, and Heels            **SHARE this note so that the co-signing nurse is able to place an eSignature**    Co-signer eSignature: Electronically signed by Rosibel Rangel RN on 9/25/20 at 8:03 PM EDT    Does the Patient have Skin Breakdown?   No          Maxime Prevention initiated:  Yes   Wound Care Orders initiated:  No      WOC nurse consulted for Pressure Injury (Stage 3,4, Unstageable, DTI, NWPT, Complex wounds)and New or Established Ostomies:  No      Primary Nurse eSignature: Electronically signed by Rosibel Rangel RN on 9/25/20 at 8:03 PM EDT

## 2020-09-26 NOTE — PLAN OF CARE
Problem: Falls - Risk of:  Goal: Will remain free from falls  Description: Will remain free from falls  Outcome: Ongoing     Problem: Discharge Planning:  Goal: Discharged to appropriate level of care  Description: Discharged to appropriate level of care  Outcome: Ongoing     Problem:  Activity Intolerance:  Goal: Able to perform prescribed physical activity  Description: Able to perform prescribed physical activity  Outcome: Ongoing     Problem: Anxiety:  Goal: Level of anxiety will decrease  Description: Level of anxiety will decrease  Outcome: Ongoing     Problem: Cardiac Output - Decreased:  Goal: Cardiac output within specified parameters  Description: Cardiac output within specified parameters  Outcome: Ongoing     Problem: Fluid Volume - Imbalance:  Goal: Ability to achieve a balanced intake and output will improve  Description: Ability to achieve a balanced intake and output will improve  Outcome: Ongoing     Problem: Gas Exchange - Impaired:  Goal: Levels of oxygenation will improve  Description: Levels of oxygenation will improve  Outcome: Ongoing     Problem: Pain:  Goal: Pain level will decrease  Description: Pain level will decrease  Outcome: Ongoing     Problem: Tissue Perfusion - Cardiopulmonary, Altered:  Goal: Absence of angina  Description: Absence of angina  Outcome: Ongoing

## 2020-09-26 NOTE — PLAN OF CARE
Problem: Falls - Risk of:  Goal: Will remain free from falls  Description: Will remain free from falls  Outcome: Met This Shift  Goal: Absence of physical injury  Description: Absence of physical injury  Outcome: Met This Shift     Problem: Discharge Planning:  Goal: Discharged to appropriate level of care  Description: Discharged to appropriate level of care  Outcome: Ongoing     Problem:  Activity Intolerance:  Goal: Able to perform prescribed physical activity  Description: Able to perform prescribed physical activity  Outcome: Ongoing  Goal: Ability to tolerate increased activity will improve  Description: Ability to tolerate increased activity will improve  Outcome: Ongoing     Problem: Anxiety:  Goal: Level of anxiety will decrease  Description: Level of anxiety will decrease  Outcome: Ongoing     Problem: Cardiac Output - Decreased:  Goal: Cardiac output within specified parameters  Description: Cardiac output within specified parameters  Outcome: Ongoing  Goal: Hemodynamic stability will improve  Description: Hemodynamic stability will improve  Outcome: Ongoing     Problem: Fluid Volume - Imbalance:  Goal: Ability to achieve a balanced intake and output will improve  Description: Ability to achieve a balanced intake and output will improve  Outcome: Ongoing  Goal: Chest tube drainage is within specified parameters  Description: Chest tube drainage is within specified parameters  Outcome: Ongoing     Problem: Gas Exchange - Impaired:  Goal: Levels of oxygenation will improve  Description: Levels of oxygenation will improve  Outcome: Ongoing     Problem: Pain:  Goal: Pain level will decrease  Description: Pain level will decrease  Outcome: Ongoing  Goal: Control of acute pain  Description: Control of acute pain  Outcome: Ongoing  Goal: Control of chronic pain  Description: Control of chronic pain  Outcome: Ongoing     Problem: Tissue Perfusion - Cardiopulmonary, Altered:  Goal: Absence of angina  Description: Absence of angina  Outcome: Ongoing  Goal: Hemodynamic stability will improve  Description: Hemodynamic stability will improve  Outcome: Ongoing  Goal: Will show no evidence of cardiac arrhythmias  Description: Will show no evidence of cardiac arrhythmias  Outcome: Ongoing     Problem: Tobacco Use:  Goal: Will participate in inpatient tobacco-use cessation counseling  Description: Will participate in inpatient tobacco-use cessation counseling  Outcome: Ongoing     Problem: Pain:  Goal: Pain level will decrease  Description: Pain level will decrease  Outcome: Ongoing  Goal: Control of acute pain  Description: Control of acute pain  Outcome: Ongoing  Goal: Control of chronic pain  Description: Control of chronic pain  Outcome: Ongoing

## 2020-09-26 NOTE — PROGRESS NOTES
Cardiovascular Progress Note      Chief Complaint:   S/P CABG  Impression/Recommendations:    Severe MVCAD: 3V CABG (LIMA-LAD, SVG-OM, SVG-D1) 9/25/20  Unstable angina presentation reported   Chronic diastolic CHF (LVEF 13% preop)  Hypertension    Hyperlipidemia  Obesity  OANH  Former tobacco       POD #1  Appreciate CTS postoperative care   Weaning pressors ; good indices   Progressing appropriately  GDMT in place  Significant diuresis in order  Limited echo Monday per primary Cardiologist Dr. Lluvia David     Interval History:   Pt. S/E. Sitting with head of bed near 90 degrees and tolerated first PO. No nausea. \"Feel sore but better\". Mild sputum production reported. 2L NC oxygen. No shortness of breath.      Sternotomy c/d/i  Dorcarri Massey in place   Norepi 16 mcg/min  Vasopressin 0.02 U/min     Medications:  morphine 2 MG/ML injection,   lactated ringers infusion, Continuous  sodium chloride flush 0.9 % injection 10 mL, 2 times per day  sodium chloride flush 0.9 % injection 10 mL, PRN  potassium chloride 20 mEq/50 mL IVPB (Central Line), PRN  magnesium sulfate 2 g in 50 mL IVPB premix, PRN  calcium chloride 1 g in sodium chloride 0.9 % 100 mL IVPB, PRN  calcium chloride 2 g in sodium chloride 0.9 % 100 mL IVPB, PRN  ceFAZolin (ANCEF) 3 g in dextrose 5 % 100 mL IVPB, Q8H  vancomycin (VANCOCIN) 2,000 mg in dextrose 5 % 500 mL IVPB, Q12H  acetaminophen (TYLENOL) tablet 650 mg, Q6H  oxyCODONE (ROXICODONE) immediate release tablet 5 mg, Q4H PRN    Or  oxyCODONE (ROXICODONE) immediate release tablet 10 mg, Q4H PRN  morphine (PF) injection 2 mg, Q2H PRN    Or  morphine (PF) injection 4 mg, Q2H PRN  ketorolac (TORADOL) injection 15 mg, Q6H  midazolam (VERSED) injection 1 mg, Q1H PRN  diphenhydrAMINE (BENADRYL) tablet 25 mg, Nightly PRN  polyethylene glycol (GLYCOLAX) packet 17 g, Daily  bisacodyl (DULCOLAX) EC tablet 5 mg, Daily  bisacodyl (DULCOLAX) suppository 10 mg, Daily PRN  ondansetron (ZOFRAN) injection 4 mg, Q8H PRN  famotidine (PEPCID) injection 20 mg, BID  metoprolol tartrate (LOPRESSOR) tablet 12.5 mg, BID  chlorhexidine (PERIDEX) 0.12 % solution 15 mL, BID  digoxin (LANOXIN) tablet 125 mcg, Daily  furosemide (LASIX) injection 40 mg, BID  magnesium oxide (MAG-OX) tablet 400 mg, Daily  nitroGLYCERIN (NITRODUR) 0.2 MG/HR 1 patch, Daily  potassium chloride (KLOR-CON M) extended release tablet 10 mEq, TID WC  fondaparinux (ARIXTRA) injection 2.5 mg, Daily  aspirin EC tablet 81 mg, Daily  aspirin suppository 300 mg, Once  albuterol sulfate  (90 Base) MCG/ACT inhaler 2 puff, Q6H PRN  albumin human 5 % IV solution 25 g, PRN  lactated ringers bolus, Continuous PRN  DOBUTamine (DOBUTREX) 500 mg in dextrose 5 % 250 mL infusion, Continuous PRN  phenylephrine (CARL-SYNEPHRINE) 50 mg in dextrose 5 % 250 mL infusion, Continuous PRN  milrinone (PRIMACOR) 20 mg in dextrose 5 % 100 mL infusion, Continuous  norepinephrine (LEVOPHED) 16 mg in dextrose 5 % 250 mL infusion, Continuous PRN  EPINEPHrine (EPINEPHrine HCL) 5 mg in dextrose 5 % 250 mL infusion, Continuous PRN  nitroGLYCERIN 50 mg in dextrose 5% 250 mL infusion, Continuous PRN  insulin regular (HUMULIN R;NOVOLIN R) 100 Units in sodium chloride 0.9 % 100 mL infusion, Titrated  insulin glargine (LANTUS) injection vial 19 Units, Nightly  [START ON 9/27/2020] insulin lispro (HUMALOG) injection vial 0-6 Units, TID WC  [START ON 9/27/2020] insulin lispro (HUMALOG) injection vial 0-3 Units, Nightly  glucose (GLUTOSE) 40 % oral gel 15 g, PRN  dextrose 50 % IV solution, PRN  glucagon (rDNA) injection 1 mg, PRN  dextrose 5 % solution, PRN  vasopressin 20 Units in dextrose 5 % 100 mL infusion, Continuous  mupirocin (BACTROBAN) 2 % ointment, BID  melatonin tablet 5 mg, Nightly PRN  acetaminophen (TYLENOL) tablet 650 mg, Q6H PRN    Or  acetaminophen (TYLENOL) suppository 650 mg, Q6H PRN  albuterol sulfate  (90 Base) MCG/ACT inhaler 2 puff, Q6H PRN  atorvastatin (LIPITOR) tablet 80 mg, Nightly  traZODone (DESYREL) tablet 50 mg, Nightly PRN  venlafaxine (EFFEXOR XR) extended release capsule 75 mg, Daily with breakfast  budesonide-formoterol (SYMBICORT) 160-4.5 MCG/ACT inhaler 2 puff, BID  ipratropium-albuterol (DUONEB) nebulizer solution 1 ampule, Q4H PRN        I/O:     Intake/Output Summary (Last 24 hours) at 9/26/2020 1043  Last data filed at 9/26/2020 0800  Gross per 24 hour   Intake 5202.99 ml   Output 3810 ml   Net 1392.99 ml       Physical Exam:    /78   Pulse 103   Temp 98.9 °F (37.2 °C) (Core)   Resp 26   Ht 5' 4\" (1.626 m)   Wt 292 lb 15.9 oz (132.9 kg)   SpO2 96%   BMI 50.29 kg/m²   Wt Readings from Last 3 Encounters:   09/26/20 292 lb 15.9 oz (132.9 kg)   09/22/20 287 lb 11.2 oz (130.5 kg)   08/28/20 286 lb (129.7 kg)       GENERAL: Well developed, well nourished, no acute distress  NEUROLOGICAL: Alert and oriented x3  PSYCH: Normal mood and affect   SKIN: Warm and dry, without lesions  HEENT: Normocephalic, atraumatic, Sclera non-icteric, mucous membranes moist  NECK: supple, JVP normal, thyroid not enlarged   CARDIAC: Normal PMI, regular rate and rhythm, normal S1S2, no murmur, rub  RESPIRATORY: diminished to auscultation bilaterally  EXTREMITIES: No cyanosis, clubbing or edema, palpable pulses bilaterally   GASTROINTESTINAL:  soft, non-distended     Data Review:    CBC:   Recent Labs     09/25/20  0338  09/25/20  1242  09/25/20  1313 09/25/20  1827 09/26/20  0520   WBC 12.3*  --  41.6*  --   --   --  21.6*   HGB 16.6   < > 13.7   < > 13.8 12.1* 12.8*   HCT 49.4  --  42.6  --   --   --  39.0*   MCV 88.7  --  91.2  --   --   --  90.4     --  121*  --   --   --  117*    < > = values in this interval not displayed.      BMP:   Recent Labs     09/25/20  0525 09/25/20  1242 09/25/20  1830 09/26/20  0520    136  --  138   K 3.9 4.3 5.5* 4.6    106  --  105   CO2 25 24  --  25   BUN 19 16  --  13   CREATININE 0.9 0.9  --  0.9   GFRAA >60 >60  --  >60   MG

## 2020-09-27 LAB
ANION GAP SERPL CALCULATED.3IONS-SCNC: 7 MMOL/L (ref 3–16)
BUN BLDV-MCNC: 10 MG/DL (ref 7–20)
CALCIUM SERPL-MCNC: 8.7 MG/DL (ref 8.3–10.6)
CHLORIDE BLD-SCNC: 100 MMOL/L (ref 99–110)
CO2: 29 MMOL/L (ref 21–32)
CREAT SERPL-MCNC: 0.8 MG/DL (ref 0.9–1.3)
GFR AFRICAN AMERICAN: >60
GFR NON-AFRICAN AMERICAN: >60
GLUCOSE BLD-MCNC: 136 MG/DL (ref 70–99)
GLUCOSE BLD-MCNC: 138 MG/DL (ref 70–99)
GLUCOSE BLD-MCNC: 156 MG/DL (ref 70–99)
GLUCOSE BLD-MCNC: 164 MG/DL (ref 70–99)
HCT VFR BLD CALC: 36.1 % (ref 40.5–52.5)
HEMOGLOBIN: 12 G/DL (ref 13.5–17.5)
MCH RBC QN AUTO: 29.9 PG (ref 26–34)
MCHC RBC AUTO-ENTMCNC: 33.3 G/DL (ref 31–36)
MCV RBC AUTO: 89.7 FL (ref 80–100)
PDW BLD-RTO: 15.4 % (ref 12.4–15.4)
PERFORMED ON: ABNORMAL
PLATELET # BLD: 91 K/UL (ref 135–450)
PLATELET SLIDE REVIEW: ABNORMAL
PMV BLD AUTO: 9.1 FL (ref 5–10.5)
POTASSIUM SERPL-SCNC: 4 MMOL/L (ref 3.5–5.1)
RBC # BLD: 4.02 M/UL (ref 4.2–5.9)
SLIDE REVIEW: ABNORMAL
SODIUM BLD-SCNC: 136 MMOL/L (ref 136–145)
WBC # BLD: 17.1 K/UL (ref 4–11)

## 2020-09-27 PROCEDURE — 2500000003 HC RX 250 WO HCPCS: Performed by: THORACIC SURGERY (CARDIOTHORACIC VASCULAR SURGERY)

## 2020-09-27 PROCEDURE — 85027 COMPLETE CBC AUTOMATED: CPT

## 2020-09-27 PROCEDURE — 99231 SBSQ HOSP IP/OBS SF/LOW 25: CPT | Performed by: INTERNAL MEDICINE

## 2020-09-27 PROCEDURE — 94761 N-INVAS EAR/PLS OXIMETRY MLT: CPT

## 2020-09-27 PROCEDURE — 6370000000 HC RX 637 (ALT 250 FOR IP): Performed by: THORACIC SURGERY (CARDIOTHORACIC VASCULAR SURGERY)

## 2020-09-27 PROCEDURE — 2700000000 HC OXYGEN THERAPY PER DAY

## 2020-09-27 PROCEDURE — 2100000000 HC CCU R&B

## 2020-09-27 PROCEDURE — 6360000002 HC RX W HCPCS: Performed by: THORACIC SURGERY (CARDIOTHORACIC VASCULAR SURGERY)

## 2020-09-27 PROCEDURE — 80048 BASIC METABOLIC PNL TOTAL CA: CPT

## 2020-09-27 PROCEDURE — 99024 POSTOP FOLLOW-UP VISIT: CPT | Performed by: THORACIC SURGERY (CARDIOTHORACIC VASCULAR SURGERY)

## 2020-09-27 PROCEDURE — 94669 MECHANICAL CHEST WALL OSCILL: CPT

## 2020-09-27 PROCEDURE — 2580000003 HC RX 258: Performed by: THORACIC SURGERY (CARDIOTHORACIC VASCULAR SURGERY)

## 2020-09-27 PROCEDURE — 94640 AIRWAY INHALATION TREATMENT: CPT

## 2020-09-27 RX ORDER — CLOPIDOGREL BISULFATE 75 MG/1
75 TABLET ORAL DAILY
Status: DISCONTINUED | OUTPATIENT
Start: 2020-09-27 | End: 2020-09-29 | Stop reason: HOSPADM

## 2020-09-27 RX ADMIN — OXYCODONE 10 MG: 5 TABLET ORAL at 18:40

## 2020-09-27 RX ADMIN — SODIUM CHLORIDE, PRESERVATIVE FREE 10 ML: 5 INJECTION INTRAVENOUS at 20:44

## 2020-09-27 RX ADMIN — KETOROLAC TROMETHAMINE 15 MG: 30 INJECTION, SOLUTION INTRAMUSCULAR at 08:07

## 2020-09-27 RX ADMIN — SODIUM CHLORIDE, PRESERVATIVE FREE 10 ML: 5 INJECTION INTRAVENOUS at 08:00

## 2020-09-27 RX ADMIN — FUROSEMIDE 40 MG: 10 INJECTION, SOLUTION INTRAMUSCULAR; INTRAVENOUS at 18:40

## 2020-09-27 RX ADMIN — FUROSEMIDE 40 MG: 10 INJECTION, SOLUTION INTRAMUSCULAR; INTRAVENOUS at 07:58

## 2020-09-27 RX ADMIN — ACETAMINOPHEN 650 MG: 325 TABLET, FILM COATED ORAL at 08:03

## 2020-09-27 RX ADMIN — OXYCODONE 10 MG: 5 TABLET ORAL at 06:40

## 2020-09-27 RX ADMIN — Medication 15 ML: at 07:58

## 2020-09-27 RX ADMIN — CEFAZOLIN SODIUM 3 G: 10 INJECTION, POWDER, FOR SOLUTION INTRAVENOUS at 02:49

## 2020-09-27 RX ADMIN — OXYCODONE 10 MG: 5 TABLET ORAL at 11:28

## 2020-09-27 RX ADMIN — CLOPIDOGREL BISULFATE 75 MG: 75 TABLET ORAL at 11:30

## 2020-09-27 RX ADMIN — TRAZODONE HYDROCHLORIDE 50 MG: 50 TABLET ORAL at 20:45

## 2020-09-27 RX ADMIN — FAMOTIDINE 20 MG: 10 INJECTION INTRAVENOUS at 20:45

## 2020-09-27 RX ADMIN — POLYETHYLENE GLYCOL 3350 17 G: 17 POWDER, FOR SOLUTION ORAL at 07:59

## 2020-09-27 RX ADMIN — OXYCODONE 10 MG: 5 TABLET ORAL at 20:45

## 2020-09-27 RX ADMIN — INSULIN LISPRO 1 UNITS: 100 INJECTION, SOLUTION INTRAVENOUS; SUBCUTANEOUS at 20:46

## 2020-09-27 RX ADMIN — POTASSIUM CHLORIDE 10 MEQ: 750 TABLET, EXTENDED RELEASE ORAL at 18:40

## 2020-09-27 RX ADMIN — ACETAMINOPHEN 650 MG: 325 TABLET, FILM COATED ORAL at 20:44

## 2020-09-27 RX ADMIN — POTASSIUM CHLORIDE 10 MEQ: 750 TABLET, EXTENDED RELEASE ORAL at 11:28

## 2020-09-27 RX ADMIN — BISACODYL 5 MG: 5 TABLET, COATED ORAL at 07:59

## 2020-09-27 RX ADMIN — MAGNESIUM GLUCONATE 500 MG ORAL TABLET 400 MG: 500 TABLET ORAL at 07:58

## 2020-09-27 RX ADMIN — Medication 2 PUFF: at 19:44

## 2020-09-27 RX ADMIN — OXYCODONE 10 MG: 5 TABLET ORAL at 02:48

## 2020-09-27 RX ADMIN — INSULIN GLARGINE 19 UNITS: 100 INJECTION, SOLUTION SUBCUTANEOUS at 20:44

## 2020-09-27 RX ADMIN — ATORVASTATIN CALCIUM 80 MG: 80 TABLET, FILM COATED ORAL at 20:45

## 2020-09-27 RX ADMIN — ACETAMINOPHEN 650 MG: 325 TABLET, FILM COATED ORAL at 02:48

## 2020-09-27 RX ADMIN — METOPROLOL TARTRATE 12.5 MG: 25 TABLET, FILM COATED ORAL at 20:44

## 2020-09-27 RX ADMIN — ASPIRIN 81 MG: 81 TABLET ORAL at 07:59

## 2020-09-27 RX ADMIN — KETOROLAC TROMETHAMINE 15 MG: 30 INJECTION, SOLUTION INTRAMUSCULAR at 02:49

## 2020-09-27 RX ADMIN — MUPIROCIN: 20 OINTMENT TOPICAL at 20:44

## 2020-09-27 RX ADMIN — Medication 15 ML: at 20:44

## 2020-09-27 RX ADMIN — FAMOTIDINE 20 MG: 10 INJECTION INTRAVENOUS at 07:58

## 2020-09-27 RX ADMIN — DIGOXIN 125 MCG: 125 TABLET ORAL at 07:59

## 2020-09-27 RX ADMIN — ACETAMINOPHEN 650 MG: 325 TABLET, FILM COATED ORAL at 14:00

## 2020-09-27 RX ADMIN — POTASSIUM CHLORIDE 10 MEQ: 750 TABLET, EXTENDED RELEASE ORAL at 07:59

## 2020-09-27 RX ADMIN — Medication 2 PUFF: at 08:15

## 2020-09-27 RX ADMIN — MUPIROCIN: 20 OINTMENT TOPICAL at 07:58

## 2020-09-27 RX ADMIN — VENLAFAXINE HYDROCHLORIDE 75 MG: 37.5 CAPSULE, EXTENDED RELEASE ORAL at 08:07

## 2020-09-27 RX ADMIN — FONDAPARINUX SODIUM 2.5 MG: 2.5 INJECTION, SOLUTION SUBCUTANEOUS at 07:59

## 2020-09-27 ASSESSMENT — PAIN SCALES - GENERAL
PAINLEVEL_OUTOF10: 8
PAINLEVEL_OUTOF10: 4
PAINLEVEL_OUTOF10: 6
PAINLEVEL_OUTOF10: 4
PAINLEVEL_OUTOF10: 8
PAINLEVEL_OUTOF10: 6
PAINLEVEL_OUTOF10: 6
PAINLEVEL_OUTOF10: 8
PAINLEVEL_OUTOF10: 9
PAINLEVEL_OUTOF10: 6
PAINLEVEL_OUTOF10: 4
PAINLEVEL_OUTOF10: 9
PAINLEVEL_OUTOF10: 6
PAINLEVEL_OUTOF10: 4
PAINLEVEL_OUTOF10: 6
PAINLEVEL_OUTOF10: 6

## 2020-09-27 NOTE — PROGRESS NOTES
Physical/Occupational Therapy    Physical/occupational therapy attempted to evaluate this patient. However per nursing the patient has not transitioned yet. PT/OT will re-attempt to evaluate this patient when he is medically stable. Thank you.      Eris Mandujano PT  Berry Shelton OT

## 2020-09-27 NOTE — PROGRESS NOTES
Cardiovascular Progress Note      Chief Complaint:   S/P CABG  Impression/Recommendations:    Severe MVCAD: 3V CABG (LIMA-LAD, SVG-OM, SVG-D1) 9/25/20  Unstable angina presentation reported   Chronic diastolic CHF (LVEF 36% preop)  Hypertension    Hyperlipidemia  Obesity  OANH  Former tobacco       POD #2  Appreciate CTS postoperative care   Weaning pressor; good indices   Progressing appropriately  GDMT in place  Significant diuresis in order  Limited echo Monday per primary Cardiologist Dr. Sai Bowen     Interval History:   Pt. S/E. Sitting with head of bed near 90 degrees on 2L NC oxygen. No shortness of breath. Pain well controlled. \"Feel better than yesterday\".    292 ---> 286 lbs   Tele: SR 90s no events   Sternotomy c/d/i  [place] , chest tubes, pacer wires   Norepi 4 mcg/min       Medications:  lactated ringers infusion, Continuous  sodium chloride flush 0.9 % injection 10 mL, 2 times per day  sodium chloride flush 0.9 % injection 10 mL, PRN  potassium chloride 20 mEq/50 mL IVPB (Central Line), PRN  magnesium sulfate 2 g in 50 mL IVPB premix, PRN  calcium chloride 1 g in sodium chloride 0.9 % 100 mL IVPB, PRN  calcium chloride 2 g in sodium chloride 0.9 % 100 mL IVPB, PRN  acetaminophen (TYLENOL) tablet 650 mg, Q6H  oxyCODONE (ROXICODONE) immediate release tablet 5 mg, Q4H PRN    Or  oxyCODONE (ROXICODONE) immediate release tablet 10 mg, Q4H PRN  morphine (PF) injection 2 mg, Q2H PRN    Or  morphine (PF) injection 4 mg, Q2H PRN  midazolam (VERSED) injection 1 mg, Q1H PRN  diphenhydrAMINE (BENADRYL) tablet 25 mg, Nightly PRN  polyethylene glycol (GLYCOLAX) packet 17 g, Daily  bisacodyl (DULCOLAX) EC tablet 5 mg, Daily  bisacodyl (DULCOLAX) suppository 10 mg, Daily PRN  ondansetron (ZOFRAN) injection 4 mg, Q8H PRN  famotidine (PEPCID) injection 20 mg, BID  metoprolol tartrate (LOPRESSOR) tablet 12.5 mg, BID  chlorhexidine (PERIDEX) 0.12 % solution 15 mL, BID  digoxin (LANOXIN) tablet 125 mcg, Daily  furosemide (LASIX) injection 40 mg, BID  magnesium oxide (MAG-OX) tablet 400 mg, Daily  nitroGLYCERIN (NITRODUR) 0.2 MG/HR 1 patch, Daily  potassium chloride (KLOR-CON M) extended release tablet 10 mEq, TID WC  fondaparinux (ARIXTRA) injection 2.5 mg, Daily  aspirin EC tablet 81 mg, Daily  aspirin suppository 300 mg, Once  albuterol sulfate  (90 Base) MCG/ACT inhaler 2 puff, Q6H PRN  albumin human 5 % IV solution 25 g, PRN  lactated ringers bolus, Continuous PRN  DOBUTamine (DOBUTREX) 500 mg in dextrose 5 % 250 mL infusion, Continuous PRN  phenylephrine (CARL-SYNEPHRINE) 50 mg in dextrose 5 % 250 mL infusion, Continuous PRN  milrinone (PRIMACOR) 20 mg in dextrose 5 % 100 mL infusion, Continuous  norepinephrine (LEVOPHED) 16 mg in dextrose 5 % 250 mL infusion, Continuous PRN  EPINEPHrine (EPINEPHrine HCL) 5 mg in dextrose 5 % 250 mL infusion, Continuous PRN  nitroGLYCERIN 50 mg in dextrose 5% 250 mL infusion, Continuous PRN  insulin glargine (LANTUS) injection vial 19 Units, Nightly  insulin lispro (HUMALOG) injection vial 0-6 Units, TID WC  insulin lispro (HUMALOG) injection vial 0-3 Units, Nightly  glucose (GLUTOSE) 40 % oral gel 15 g, PRN  dextrose 50 % IV solution, PRN  glucagon (rDNA) injection 1 mg, PRN  dextrose 5 % solution, PRN  vasopressin 20 Units in dextrose 5 % 100 mL infusion, Continuous  mupirocin (BACTROBAN) 2 % ointment, BID  melatonin tablet 5 mg, Nightly PRN  acetaminophen (TYLENOL) tablet 650 mg, Q6H PRN    Or  acetaminophen (TYLENOL) suppository 650 mg, Q6H PRN  albuterol sulfate  (90 Base) MCG/ACT inhaler 2 puff, Q6H PRN  atorvastatin (LIPITOR) tablet 80 mg, Nightly  traZODone (DESYREL) tablet 50 mg, Nightly PRN  venlafaxine (EFFEXOR XR) extended release capsule 75 mg, Daily with breakfast  budesonide-formoterol (SYMBICORT) 160-4.5 MCG/ACT inhaler 2 puff, BID  ipratropium-albuterol (DUONEB) nebulizer solution 1 ampule, Q4H PRN        I/O:     Intake/Output Summary (Last 24 hours) at 9/27/2020 [address], [address]      NOTE:  This report was transcribed using voice recognition software. Every effort was made to ensure accuracy; however, inadvertent computerized transcription errors may be present.

## 2020-09-27 NOTE — PROGRESS NOTES
Pt is POD #2 Urgent coronary artery bypass grafting surgery x3 with a single greater saphenous vein graft to the obtuse marginal branch of the circumflex, separate single greater saphenous vein graft to the first diagonal branch of the LAD, left internal artery to the LAD. Pt has CT x2, Ribeiro, A Line and SWAN patent and transducing WNL. Platelet gel application. Pt currently infusing , Levo @ 8 mcg/min, Dobutamine parked @3 mcg/kg/min per Dr Candace Miguel. Pt on N/C at 2L in place. Pt C/O Surgical CP and pain R/T CT placement is reduced. Pt is AOx3 and repositioned for comfort as well as possible. Will continue to monitor.

## 2020-09-27 NOTE — PLAN OF CARE
Problem: Falls - Risk of:  Goal: Will remain free from falls  Description: Will remain free from falls  9/27/2020 1010 by Maryjo Traore RN  Outcome: Met This Shift  9/26/2020 2331 by Margarita Forbes RN  Outcome: Ongoing  Goal: Absence of physical injury  Description: Absence of physical injury  Outcome: Met This Shift     Problem: Discharge Planning:  Goal: Discharged to appropriate level of care  Description: Discharged to appropriate level of care  Outcome: Ongoing     Problem:  Activity Intolerance:  Goal: Able to perform prescribed physical activity  Description: Able to perform prescribed physical activity  9/27/2020 1010 by Maryjo Traore RN  Outcome: Ongoing  9/26/2020 2331 by Margarita Forbes RN  Outcome: Ongoing  Goal: Ability to tolerate increased activity will improve  Description: Ability to tolerate increased activity will improve  Outcome: Ongoing     Problem: Anxiety:  Goal: Level of anxiety will decrease  Description: Level of anxiety will decrease  9/27/2020 1010 by Maryjo Traore RN  Outcome: Met This Shift  9/26/2020 2331 by Margarita Forbes RN  Outcome: Ongoing     Problem: Cardiac Output - Decreased:  Goal: Cardiac output within specified parameters  Description: Cardiac output within specified parameters  9/27/2020 1010 by Maryjo Traore RN  Outcome: Ongoing  9/26/2020 2331 by Margarita Forbes RN  Outcome: Ongoing  Goal: Hemodynamic stability will improve  Description: Hemodynamic stability will improve  Outcome: Ongoing     Problem: Fluid Volume - Imbalance:  Goal: Ability to achieve a balanced intake and output will improve  Description: Ability to achieve a balanced intake and output will improve  9/27/2020 1010 by Maryjo Traore RN  Outcome: Ongoing  9/26/2020 2331 by Margarita Forbes RN  Outcome: Ongoing  Goal: Chest tube drainage is within specified parameters  Description: Chest tube drainage is within specified parameters  Outcome: Met This Shift     Problem: Gas Exchange - Impaired:  Goal: Levels of oxygenation will improve  Description: Levels of oxygenation will improve  Outcome: Ongoing  Goal: Ability to maintain adequate ventilation will improve  Description: Ability to maintain adequate ventilation will improve  Outcome: Met This Shift     Problem: Pain:  Goal: Pain level will decrease  Description: Pain level will decrease  9/27/2020 1010 by Getachew Low RN  Outcome: Ongoing  9/26/2020 2331 by Hugo Theodore RN  Outcome: Ongoing  Goal: Control of acute pain  Description: Control of acute pain  Outcome: Ongoing  Goal: Control of chronic pain  Description: Control of chronic pain  Outcome: Ongoing     Problem: Tissue Perfusion - Cardiopulmonary, Altered:  Goal: Absence of angina  Description: Absence of angina  9/27/2020 1010 by Getachew Low RN  Outcome: Met This Shift  9/26/2020 2331 by Hugo Theodore RN  Outcome: Ongoing  Goal: Hemodynamic stability will improve  Description: Hemodynamic stability will improve  Outcome: Ongoing  Goal: Will show no evidence of cardiac arrhythmias  Description: Will show no evidence of cardiac arrhythmias  Outcome: Met This Shift     Problem: Tobacco Use:  Goal: Will participate in inpatient tobacco-use cessation counseling  Description: Will participate in inpatient tobacco-use cessation counseling  Outcome: Ongoing     Problem: Pain:  Goal: Pain level will decrease  Description: Pain level will decrease  9/27/2020 1010 by Getachew Low RN  Outcome: Ongoing  9/26/2020 2331 by Hugo Theodore RN  Outcome: Ongoing  Goal: Control of acute pain  Description: Control of acute pain  Outcome: Ongoing  Goal: Control of chronic pain  Description: Control of chronic pain  Outcome: Ongoing

## 2020-09-27 NOTE — PROGRESS NOTES
Patient met criteria per open heart protocol to transition to stepdown level of care. Procedures explained to patient. RIght  Honora Angers discontinued and obturator inserted. Patient tolerated well and minimal ectopy on monitor. Right brachial arterial line discontinued. Manual pressure held for 5 minutes and tegaderm on site. No hematoma, no oozing noted. Patient tolerated well.

## 2020-09-27 NOTE — PROGRESS NOTES
[facility]  Clinic Note     9/26/2020 9:21 PM  Surgeon: Trena Portillo    S/P : Coronary artery bypass x3    Chief complaint : Postop follow-up  Subjective:  Mr. Shereen Dominguez   No major complaint or event overnight not transition  Vital Signs: /78   Pulse 111   Temp 99.6 °F (37.6 °C) (Core)   Resp 17   Ht 5' 4\" (1.626 m)   Wt 292 lb 15.9 oz (132.9 kg)   SpO2 97%   BMI 50.29 kg/m²  O2 Flow Rate (L/min): 2 L/min   I/O:      Intake/Output Summary (Last 24 hours) at 9/26/2020 2121  Last data filed at 9/26/2020 2111  Gross per 24 hour   Intake 922.24 ml   Output 4374 ml   Net -3451.76 ml       Exam:   Cardiovascular: 1+ S2 +0 no additional sound  Pulmonary: Bilaterally clear no additional sound       Labs:   CBC:   Recent Labs     09/25/20  0338  09/25/20  1242  09/25/20  1313 09/25/20  1827 09/26/20  0520   WBC 12.3*  --  41.6*  --   --   --  21.6*   HGB 16.6   < > 13.7   < > 13.8 12.1* 12.8*   HCT 49.4  --  42.6  --   --   --  39.0*   MCV 88.7  --  91.2  --   --   --  90.4     --  121*  --   --   --  117*    < > = values in this interval not displayed.      BMP:   Recent Labs     09/25/20  0525 09/25/20  1242 09/25/20  1830 09/26/20  0520    136  --  138   K 3.9 4.3 5.5* 4.6    106  --  105   CO2 25 24  --  25   BUN 19 16  --  13   CREATININE 0.9 0.9  --  0.9     PT/INR:   Recent Labs     09/25/20 0338 09/26/20  0520   PROTIME 13.5* 15.7*   INR 1.16* 1.35*     APTT:   Recent Labs     09/24/20  0641 09/25/20  0525 09/25/20  1450   APTT 61.3* 49.8* 21.7*       Scheduled Meds:    sodium chloride flush  10 mL Intravenous 2 times per day    ceFAZolin (ANCEF) IVPB  3 g Intravenous Q8H    vancomycin (VANCOCIN) IV  2,000 mg Intravenous Q12H    acetaminophen  650 mg Oral Q6H    ketorolac  15 mg Intravenous Q6H    polyethylene glycol  17 g Oral Daily    bisacodyl  5 mg Oral Daily    famotidine (PEPCID) injection  20 mg Intravenous BID    metoprolol tartrate  12.5 mg Oral BID    chlorhexidine  15 mL Mouth/Throat BID    digoxin  125 mcg Oral Daily    furosemide  40 mg Intravenous BID    magnesium oxide  400 mg Oral Daily    nitroGLYCERIN  1 patch Transdermal Daily    potassium chloride  10 mEq Oral TID WC    fondaparinux  2.5 mg Subcutaneous Daily    aspirin  81 mg Oral Daily    aspirin  300 mg Rectal Once    insulin glargine  0.15 Units/kg Subcutaneous Nightly    [START ON 9/27/2020] insulin lispro  0-6 Units Subcutaneous TID WC    [START ON 9/27/2020] insulin lispro  0-3 Units Subcutaneous Nightly    mupirocin   Nasal BID    atorvastatin  80 mg Oral Nightly    venlafaxine  75 mg Oral Daily with breakfast    budesonide-formoterol  2 puff Inhalation BID       Patient Active Problem List   Diagnosis    Crush injury of right foot    Shortness of breath    Chest pain    Erectile dysfunction    Hyperglycemia    Anxiety    Depression    Tobacco abuse    History of alcohol abuse    Fatigue    OANH (obstructive sleep apnea)    Hypersomnia    Chronic obstructive pulmonary disease (HCC)    Lumbar radiculopathy    HNP (herniated nucleus pulposus), lumbar    Spondylosis without myelopathy or radiculopathy, lumbar region    DDD (degenerative disc disease), lumbar    Lumbar spine pain    PNA (pneumonia)    Pneumonia    Acute respiratory distress    Class 3 severe obesity with body mass index (BMI) of 45.0 to 49.9 in adult (HCC)    Pneumonia, primary atypical    Acute respiratory failure with hypoxia (HCC)    Moderate protein-calorie malnutrition (HCC)    Acute respiratory failure (HCC)    Acute on chronic respiratory failure with hypoxemia (HCC)    Acute diastolic congestive heart failure (HCC)    Chronic diastolic congestive heart failure (HCC)    Hyperlipidemia    Essential hypertension    Insomnia    Morbid obesity with BMI of 45.0-49.9, adult (HCC)    Unstable angina (HCC)    Abnormal cardiovascular stress test    CAD in native artery Assessment/Plan:   1. DC vasopressin, wean Levophed as tolerated  2. Keep dobutamine at 3  3.  Use BiPAP at night    Tristin Brown MD FACS

## 2020-09-27 NOTE — PROGRESS NOTES
4 Eyes Skin Assessment     The patient is being assess for   Shift Handoff    I agree that 2 RN's have performed a thorough Head to Toe Skin Assessment on the patient. ALL assessment sites listed below have been assessed. Areas assessed by both nurses:   [x]   Head, Face, and Ears   [x]   Shoulders, Back, and Chest, Abdomen  [x]   Arms, Elbows, and Hands   [x]   Coccyx, Sacrum, and Ischium  [x]   Legs, Feet, and Heels            **SHARE this note so that the co-signing nurse is able to place an eSignature**    Co-signer eSignature: Electronically signed by Sushil Burgos RN on 9/27/20 at 2:19 PM EDT    Does the Patient have Skin Breakdown?   No          Maxime Prevention initiated:  Yes   Wound Care Orders initiated:  No      [facility] nurse consulted for Pressure Injury (Stage 3,4, Unstageable, DTI, NWPT, Complex wounds)and New or Established Ostomies:  No      Primary Nurse eSignature: Electronically signed by Héctor Sesay RN on 9/26/20 at 9:35 PM EDT

## 2020-09-27 NOTE — PLAN OF CARE
[address] REHAB PHASE I  CABG x3    Rowan Oro   1960 09/27/20    Previous cardiac rehab notes: none previously documented      Activity limitations:  shortness of breath  fatigue    Patient and family's stated goal of care prior to discharge:  Patient goal(s): reduce shortness of breath  increase activity tolerance      History:  Past Medical History:   Diagnosis Date    Anxiety 1/6/2020    Aortic valve calcification 09/2020    seen on lung CT    Chronic obstructive pulmonary disease ([address] 9/3/2019    PFT done 9/03/19    Coronary artery calcification 09/2020    seen on lung ct    Crush injury of right foot 7/23/2015    DDD (degenerative disc disease), lumbar 1/6/2020    Erectile dysfunction 1/6/2020    Fatigue 1/6/2020    History of alcohol abuse 1/6/2020    History of drug use     HNP (herniated nucleus pulposus), lumbar 1/6/2020    Hyperglycemia 1/6/2020    Hypersomnia 1/6/2020    Kidney stone     Lumbar radiculopathy 1/6/2020    Lumbar spine pain 1/6/2020    LVH (left ventricular hypertrophy)     seen on echo 8/2020, moderate    Observed sleep apnea 1/6/2020    Reactive depression 1/6/2020    Spondylosis without myelopathy or radiculopathy, lumbar region 1/6/2020    Tobacco use 1/6/2020         ACTIVITY TOLERANCE    Patient's baseline reported from RN prior to activity:  RPE: 6/20   RPD: 1/10   O2: 96% on 2 L   HR:  89   BP:  120/69       Patient's tolerance during activity:  Stage 3  Ambulate:   Patient assistance level: 1 x assist   Assist Devices: none       RPE: 14/20   RPD: 3/10   Rest breaks 1 side of bed and 1 standing rest break   Distance 5 feet   /68   HR 93   O2 91% on 2 L      Time: 30 Minutes    Discussed with RN to see patient. Per RN-carly to work with pt. Assisted pt from supine to sitting position from bed. Assisted pt from side of bed to standing position. Pt ambulated in room with 1x assist and no assistive device.  Assisted pt back to chair and placed phone and call light within reach. Pt has no needs at this time. Touched base with RN after pt's session, discussed pt toleration. Will continue to follow up during the duration of the CR order.      SALAZAR Calero 09/27/20 9:30 AM    Exercise Physiologist    Phone: [phone]

## 2020-09-27 NOTE — PROGRESS NOTES
[facility]  Clinic Note     9/27/2020 9:55 AM  Surgeon: Mohan Hutchinson    S/P : Coronary artery bypass x3    Chief complaint : Postop follow-up  Subjective:  Mr. Mago Wilson   No major complaint or event overnight not transition  9/27 transitioned off all drips sitting in the chair comfortable  Vital Signs: /69   Pulse 87   Temp 99.1 °F (37.3 °C) (Core)   Resp 17   Ht 5' 4\" (1.626 m)   Wt 286 lb 2.5 oz (129.8 kg)   SpO2 93%   BMI 49.12 kg/m²  O2 Flow Rate (L/min): 2 L/min   I/O:      Intake/Output Summary (Last 24 hours) at 9/27/2020 0955  Last data filed at 9/27/2020 0900  Gross per 24 hour   Intake 370 ml   Output 4179 ml   Net -3809 ml       Exam:   Cardiovascular: 1+ S2 +0 no additional sound  Pulmonary: Bilaterally clear no additional sound       Labs:   CBC:   Recent Labs     09/25/20  1242  09/25/20  1827 09/26/20  0520 09/27/20  0515   WBC 41.6*  --   --  21.6* 17.1*   HGB 13.7   < > 12.1* 12.8* 12.0*   HCT 42.6  --   --  39.0* 36.1*   MCV 91.2  --   --  90.4 89.7   *  --   --  117* 91*    < > = values in this interval not displayed.      BMP:   Recent Labs     09/25/20  1242 09/25/20  1830 09/26/20  0520 09/27/20  0515     --  138 136   K 4.3 5.5* 4.6 4.0     --  105 100   CO2 24  --  25 29   BUN 16  --  13 10   CREATININE 0.9  --  0.9 0.8*     PT/INR:   Recent Labs     09/25/20  0338 09/26/20  0520   PROTIME 13.5* 15.7*   INR 1.16* 1.35*     APTT:   Recent Labs     09/25/20  0525 09/25/20  1450   APTT 49.8* 21.7*       Scheduled Meds:    sodium chloride flush  10 mL Intravenous 2 times per day    acetaminophen  650 mg Oral Q6H    polyethylene glycol  17 g Oral Daily    bisacodyl  5 mg Oral Daily    famotidine (PEPCID) injection  20 mg Intravenous BID    metoprolol tartrate  12.5 mg Oral BID    chlorhexidine  15 mL Mouth/Throat BID    digoxin  125 mcg Oral Daily    furosemide  40 mg Intravenous BID    magnesium oxide  400 mg Oral Daily    nitroGLYCERIN  1 patch Transdermal Daily    potassium chloride  10 mEq Oral TID WC    fondaparinux  2.5 mg Subcutaneous Daily    aspirin  81 mg Oral Daily    aspirin  300 mg Rectal Once    insulin glargine  0.15 Units/kg Subcutaneous Nightly    insulin lispro  0-6 Units Subcutaneous TID     insulin lispro  0-3 Units Subcutaneous Nightly    mupirocin   Nasal BID    atorvastatin  80 mg Oral Nightly    venlafaxine  75 mg Oral Daily with breakfast    budesonide-formoterol  2 puff Inhalation BID       Patient Active Problem List   Diagnosis    Crush injury of right foot    Shortness of breath    Chest pain    Erectile dysfunction    Hyperglycemia    Anxiety    Depression    Tobacco abuse    History of alcohol abuse    Fatigue    OANH (obstructive sleep apnea)    Hypersomnia    Chronic obstructive pulmonary disease (HCC)    Lumbar radiculopathy    HNP (herniated nucleus pulposus), lumbar    Spondylosis without myelopathy or radiculopathy, lumbar region    DDD (degenerative disc disease), lumbar    Lumbar spine pain    PNA (pneumonia)    Pneumonia    Acute respiratory distress    Class 3 severe obesity with body mass index (BMI) of 45.0 to 49.9 in adult (HCC)    Pneumonia, primary atypical    Acute respiratory failure with hypoxia (HCC)    Moderate protein-calorie malnutrition (HCC)    Acute respiratory failure (HCC)    Acute on chronic respiratory failure with hypoxemia (HCC)    Acute diastolic congestive heart failure (HCC)    Chronic diastolic congestive heart failure (HCC)    Hyperlipidemia    Essential hypertension    Insomnia    Morbid obesity with BMI of 45.0-49.9, adult (HCC)    Unstable angina (HCC)    Abnormal cardiovascular stress test    CAD in native artery       Assessment/Plan:   DC chest tube DC wires DC Ribeiro catheter out of bed as tolerated  Discharge possible Tuesday

## 2020-09-27 NOTE — PLAN OF CARE
Problem: Falls - Risk of:  Goal: Will remain free from falls  Description: Will remain free from falls  9/26/2020 2331 by Renee Braxton RN  Outcome: Ongoing  9/26/2020 1032 by Mannie Jaquez RN  Outcome: Met This Shift     Problem:  Activity Intolerance:  Goal: Able to perform prescribed physical activity  Description: Able to perform prescribed physical activity  9/26/2020 2331 by Renee Braxton RN  Outcome: Ongoing  9/26/2020 1032 by Mannie Jaquez RN  Outcome: Ongoing     Problem: Anxiety:  Goal: Level of anxiety will decrease  Description: Level of anxiety will decrease  9/26/2020 2331 by Renee Braxton RN  Outcome: Ongoing  9/26/2020 1032 by Mannie Jaquez RN  Outcome: Ongoing     Problem: Cardiac Output - Decreased:  Goal: Cardiac output within specified parameters  Description: Cardiac output within specified parameters  9/26/2020 2331 by Renee Braxton RN  Outcome: Ongoing  9/26/2020 1032 by Mannie Jaquez RN  Outcome: Ongoing     Problem: Fluid Volume - Imbalance:  Goal: Ability to achieve a balanced intake and output will improve  Description: Ability to achieve a balanced intake and output will improve  9/26/2020 2331 by Renee Braxton RN  Outcome: Ongoing  9/26/2020 1032 by Mannie Jaquez RN  Outcome: Ongoing     Problem: Pain:  Goal: Pain level will decrease  Description: Pain level will decrease  9/26/2020 2331 by Renee Braxton RN  Outcome: Ongoing  9/26/2020 1032 by Mannie Jaquez RN  Outcome: Ongoing     Problem: Tissue Perfusion - Cardiopulmonary, Altered:  Goal: Absence of angina  Description: Absence of angina  9/26/2020 2331 by Renee Braxton RN  Outcome: Ongoing  9/26/2020 1032 by Mannie Jaquez RN  Outcome: Ongoing  Goal: Hemodynamic stability will improve  Description: Hemodynamic stability will improve  9/26/2020 1032 by Mannie Jaquez RN  Outcome: Ongoing  Goal: Will show no evidence of cardiac arrhythmias  Description: Will show no evidence of cardiac arrhythmias  9/26/2020 1032 by Libertad Galvan CAMILO Santo  Outcome: Ongoing

## 2020-09-27 NOTE — PROGRESS NOTES
Chest tubes meet criteria to remove per open heart protocol. A specific order has been entered to remove by Dr Darron Carvajal. No air leak and No crepitus noted. Pt instructed on procedure. Pt premedicated with oral pain medication per physician order. Dressings removed. Incision within normal limits. Site cleansed and prepped per protocol. Chest tubes X 2 removed without difficulty. Vaseline gauze and dry sterile dressing applied. Bilateral breath sounds audible. O2Sats 96% on room air. Patient tolerated well.

## 2020-09-28 LAB
ACTIVATED CLOTTING TIME: 106 SEC (ref 99–130)
ACTIVATED CLOTTING TIME: 114 SEC (ref 99–130)
ACTIVATED CLOTTING TIME: 419 SEC (ref 99–130)
ACTIVATED CLOTTING TIME: 426 SEC (ref 99–130)
ACTIVATED CLOTTING TIME: 438 SEC (ref 99–130)
ACTIVATED CLOTTING TIME: 448 SEC (ref 99–130)
ANION GAP SERPL CALCULATED.3IONS-SCNC: 8 MMOL/L (ref 3–16)
BLOOD BANK DISPENSE STATUS: NORMAL
BLOOD BANK DISPENSE STATUS: NORMAL
BLOOD BANK PRODUCT CODE: NORMAL
BLOOD BANK PRODUCT CODE: NORMAL
BPU ID: NORMAL
BPU ID: NORMAL
BUN BLDV-MCNC: 17 MG/DL (ref 7–20)
CALCIUM SERPL-MCNC: 8.9 MG/DL (ref 8.3–10.6)
CHLORIDE BLD-SCNC: 98 MMOL/L (ref 99–110)
CO2: 34 MMOL/L (ref 21–32)
CREAT SERPL-MCNC: 0.7 MG/DL (ref 0.9–1.3)
DESCRIPTION BLOOD BANK: NORMAL
DESCRIPTION BLOOD BANK: NORMAL
EKG ATRIAL RATE: 101 BPM
EKG DIAGNOSIS: NORMAL
EKG P AXIS: 39 DEGREES
EKG P-R INTERVAL: 152 MS
EKG Q-T INTERVAL: 304 MS
EKG QRS DURATION: 76 MS
EKG QTC CALCULATION (BAZETT): 394 MS
EKG R AXIS: 3 DEGREES
EKG T AXIS: 64 DEGREES
EKG VENTRICULAR RATE: 101 BPM
GFR AFRICAN AMERICAN: >60
GFR NON-AFRICAN AMERICAN: >60
GLUCOSE BLD-MCNC: 127 MG/DL (ref 70–99)
HCT VFR BLD CALC: 38 % (ref 40.5–52.5)
HEMOGLOBIN: 12.4 G/DL (ref 13.5–17.5)
MCH RBC QN AUTO: 29.7 PG (ref 26–34)
MCHC RBC AUTO-ENTMCNC: 32.7 G/DL (ref 31–36)
MCV RBC AUTO: 91 FL (ref 80–100)
PDW BLD-RTO: 15.3 % (ref 12.4–15.4)
PLATELET # BLD: 112 K/UL (ref 135–450)
PMV BLD AUTO: 8.5 FL (ref 5–10.5)
POTASSIUM SERPL-SCNC: 4.7 MMOL/L (ref 3.5–5.1)
RBC # BLD: 4.17 M/UL (ref 4.2–5.9)
SODIUM BLD-SCNC: 140 MMOL/L (ref 136–145)
WBC # BLD: 18.2 K/UL (ref 4–11)

## 2020-09-28 PROCEDURE — 99232 SBSQ HOSP IP/OBS MODERATE 35: CPT | Performed by: NURSE PRACTITIONER

## 2020-09-28 PROCEDURE — 6360000002 HC RX W HCPCS: Performed by: THORACIC SURGERY (CARDIOTHORACIC VASCULAR SURGERY)

## 2020-09-28 PROCEDURE — 94640 AIRWAY INHALATION TREATMENT: CPT

## 2020-09-28 PROCEDURE — 6370000000 HC RX 637 (ALT 250 FOR IP): Performed by: THORACIC SURGERY (CARDIOTHORACIC VASCULAR SURGERY)

## 2020-09-28 PROCEDURE — 94669 MECHANICAL CHEST WALL OSCILL: CPT

## 2020-09-28 PROCEDURE — 2100000000 HC CCU R&B

## 2020-09-28 PROCEDURE — 93010 ELECTROCARDIOGRAM REPORT: CPT | Performed by: INTERNAL MEDICINE

## 2020-09-28 PROCEDURE — 93005 ELECTROCARDIOGRAM TRACING: CPT | Performed by: THORACIC SURGERY (CARDIOTHORACIC VASCULAR SURGERY)

## 2020-09-28 PROCEDURE — 85027 COMPLETE CBC AUTOMATED: CPT

## 2020-09-28 PROCEDURE — 97116 GAIT TRAINING THERAPY: CPT

## 2020-09-28 PROCEDURE — 97535 SELF CARE MNGMENT TRAINING: CPT

## 2020-09-28 PROCEDURE — 80048 BASIC METABOLIC PNL TOTAL CA: CPT

## 2020-09-28 PROCEDURE — 97166 OT EVAL MOD COMPLEX 45 MIN: CPT

## 2020-09-28 PROCEDURE — 99024 POSTOP FOLLOW-UP VISIT: CPT | Performed by: THORACIC SURGERY (CARDIOTHORACIC VASCULAR SURGERY)

## 2020-09-28 PROCEDURE — 97162 PT EVAL MOD COMPLEX 30 MIN: CPT

## 2020-09-28 PROCEDURE — 6370000000 HC RX 637 (ALT 250 FOR IP): Performed by: NURSE PRACTITIONER

## 2020-09-28 PROCEDURE — 2580000003 HC RX 258: Performed by: THORACIC SURGERY (CARDIOTHORACIC VASCULAR SURGERY)

## 2020-09-28 RX ORDER — FAMOTIDINE 20 MG/1
20 TABLET, FILM COATED ORAL 2 TIMES DAILY
Status: DISCONTINUED | OUTPATIENT
Start: 2020-09-28 | End: 2020-09-29 | Stop reason: HOSPADM

## 2020-09-28 RX ORDER — BISACODYL 10 MG
20 SUPPOSITORY, RECTAL RECTAL ONCE
Status: DISCONTINUED | OUTPATIENT
Start: 2020-09-28 | End: 2020-09-29 | Stop reason: HOSPADM

## 2020-09-28 RX ADMIN — FUROSEMIDE 40 MG: 10 INJECTION, SOLUTION INTRAMUSCULAR; INTRAVENOUS at 09:05

## 2020-09-28 RX ADMIN — OXYCODONE 5 MG: 5 TABLET ORAL at 09:09

## 2020-09-28 RX ADMIN — ASPIRIN 81 MG: 81 TABLET ORAL at 09:04

## 2020-09-28 RX ADMIN — FUROSEMIDE 40 MG: 10 INJECTION, SOLUTION INTRAMUSCULAR; INTRAVENOUS at 16:19

## 2020-09-28 RX ADMIN — ACETAMINOPHEN 650 MG: 325 TABLET ORAL at 20:27

## 2020-09-28 RX ADMIN — SODIUM CHLORIDE, PRESERVATIVE FREE 10 ML: 5 INJECTION INTRAVENOUS at 09:05

## 2020-09-28 RX ADMIN — POLYETHYLENE GLYCOL 3350 17 G: 17 POWDER, FOR SOLUTION ORAL at 09:04

## 2020-09-28 RX ADMIN — VENLAFAXINE HYDROCHLORIDE 75 MG: 37.5 CAPSULE, EXTENDED RELEASE ORAL at 09:04

## 2020-09-28 RX ADMIN — OXYCODONE 10 MG: 5 TABLET ORAL at 12:44

## 2020-09-28 RX ADMIN — METOPROLOL TARTRATE 12.5 MG: 25 TABLET, FILM COATED ORAL at 20:46

## 2020-09-28 RX ADMIN — DIGOXIN 125 MCG: 125 TABLET ORAL at 09:04

## 2020-09-28 RX ADMIN — BISACODYL 5 MG: 5 TABLET, COATED ORAL at 09:04

## 2020-09-28 RX ADMIN — MUPIROCIN: 20 OINTMENT TOPICAL at 19:52

## 2020-09-28 RX ADMIN — MUPIROCIN: 20 OINTMENT TOPICAL at 09:08

## 2020-09-28 RX ADMIN — METOPROLOL TARTRATE 12.5 MG: 25 TABLET, FILM COATED ORAL at 09:04

## 2020-09-28 RX ADMIN — MAGNESIUM GLUCONATE 500 MG ORAL TABLET 400 MG: 500 TABLET ORAL at 09:06

## 2020-09-28 RX ADMIN — FONDAPARINUX SODIUM 2.5 MG: 2.5 INJECTION, SOLUTION SUBCUTANEOUS at 09:04

## 2020-09-28 RX ADMIN — SODIUM CHLORIDE, PRESERVATIVE FREE 10 ML: 5 INJECTION INTRAVENOUS at 19:52

## 2020-09-28 RX ADMIN — Medication 2 PUFF: at 08:18

## 2020-09-28 RX ADMIN — Medication 15 ML: at 09:06

## 2020-09-28 RX ADMIN — FAMOTIDINE 20 MG: 20 TABLET, FILM COATED ORAL at 09:04

## 2020-09-28 RX ADMIN — FAMOTIDINE 20 MG: 20 TABLET, FILM COATED ORAL at 20:27

## 2020-09-28 RX ADMIN — POTASSIUM CHLORIDE 10 MEQ: 750 TABLET, EXTENDED RELEASE ORAL at 09:06

## 2020-09-28 RX ADMIN — POTASSIUM CHLORIDE 10 MEQ: 750 TABLET, EXTENDED RELEASE ORAL at 12:20

## 2020-09-28 RX ADMIN — TRAZODONE HYDROCHLORIDE 50 MG: 50 TABLET ORAL at 20:29

## 2020-09-28 RX ADMIN — Medication 15 ML: at 19:52

## 2020-09-28 RX ADMIN — OXYCODONE 10 MG: 5 TABLET ORAL at 17:11

## 2020-09-28 RX ADMIN — CLOPIDOGREL BISULFATE 75 MG: 75 TABLET ORAL at 09:05

## 2020-09-28 RX ADMIN — Medication 2 PUFF: at 20:17

## 2020-09-28 RX ADMIN — OXYCODONE 10 MG: 5 TABLET ORAL at 06:09

## 2020-09-28 RX ADMIN — POTASSIUM CHLORIDE 10 MEQ: 750 TABLET, EXTENDED RELEASE ORAL at 16:19

## 2020-09-28 RX ADMIN — ATORVASTATIN CALCIUM 80 MG: 80 TABLET, FILM COATED ORAL at 20:27

## 2020-09-28 RX ADMIN — OXYCODONE 10 MG: 5 TABLET ORAL at 23:06

## 2020-09-28 ASSESSMENT — PAIN SCALES - GENERAL
PAINLEVEL_OUTOF10: 3
PAINLEVEL_OUTOF10: 0
PAINLEVEL_OUTOF10: 8
PAINLEVEL_OUTOF10: 0
PAINLEVEL_OUTOF10: 0
PAINLEVEL_OUTOF10: 3
PAINLEVEL_OUTOF10: 0
PAINLEVEL_OUTOF10: 3
PAINLEVEL_OUTOF10: 0
PAINLEVEL_OUTOF10: 2
PAINLEVEL_OUTOF10: 0
PAINLEVEL_OUTOF10: 7

## 2020-09-28 ASSESSMENT — PAIN DESCRIPTION - FREQUENCY: FREQUENCY: CONTINUOUS

## 2020-09-28 ASSESSMENT — PAIN DESCRIPTION - LOCATION
LOCATION: CHEST
LOCATION: CHEST

## 2020-09-28 ASSESSMENT — PAIN DESCRIPTION - ONSET: ONSET: ON-GOING

## 2020-09-28 ASSESSMENT — PAIN DESCRIPTION - PAIN TYPE
TYPE: SURGICAL PAIN
TYPE: SURGICAL PAIN

## 2020-09-28 ASSESSMENT — PAIN - FUNCTIONAL ASSESSMENT: PAIN_FUNCTIONAL_ASSESSMENT: PREVENTS OR INTERFERES SOME ACTIVE ACTIVITIES AND ADLS

## 2020-09-28 ASSESSMENT — PAIN DESCRIPTION - DESCRIPTORS: DESCRIPTORS: ACHING

## 2020-09-28 ASSESSMENT — PAIN DESCRIPTION - PROGRESSION: CLINICAL_PROGRESSION: NOT CHANGED

## 2020-09-28 ASSESSMENT — PAIN DESCRIPTION - ORIENTATION
ORIENTATION: MID
ORIENTATION: MID

## 2020-09-28 NOTE — PROGRESS NOTES
[facility]   Daily Progress Note    Admit Date:  9/22/2020  HPI:   Presented with chest pain. ECG and troponin negative for ischemia. Stress test with high risk abnormality. Hx of CHF (HFpEF), LVH, COPD, OANH non-compliant with CPAP, tobacco use. Transferred to [facility] for [facility] on 9/22/20 demonstrating severe MVCAD, referred for CABG. S/p CABG X 3 on 9/25/20. Subjective:  Mr. Fidencio Roe feels better \"inside\" but having pain with activity/ movement 2/2 surgical incisions. shortness of breath with exertion, repositioning. Wife at bedside. Objective:   BP (!) 141/82   Pulse 97   Temp 98.3 °F (36.8 °C) (Oral)   Resp 18   Ht 5' 4\" (1.626 m)   Wt 273 lb 13 oz (124.2 kg)   SpO2 94%   BMI 47.00 kg/m²       Intake/Output Summary (Last 24 hours) at 9/28/2020 1427  Last data filed at 9/28/2020 0904  Gross per 24 hour   Intake 490 ml   Output 2250 ml   Net -1760 ml     Wt Readings from Last 3 Encounters:   09/28/20 273 lb 13 oz (124.2 kg)   09/22/20 287 lb 11.2 oz (130.5 kg)   08/28/20 286 lb (129.7 kg)         ASSESSMENT:   1. CAD - severe MV CAD, s/p CABG X3 on 9/25/20, recovering well  2. HTN - stable, monitor with diuresi  3. CHF, chronic diastolic - + fluid overload following surgery, diuresing well  4. HLD - LDL 66, on statin  5. COPD  6. OANH  7. Smoker      PLAN:  1. Off all pressors, inotropes  2. Continue dual antiplatelet therapy, statin and low dose BB  3. Limited echo tomorrow  4. Anticipate discharge in next 1-2 days.   Has follow up appointment at [facility] location    ANICETO Solis CNP, 9/28/2020, 2:27 PM  [facility]   [phone]       Telemetry: SR 90's  NYHA: III    Physical Exam:  General:  Awake, alert, NAD  Skin:  Warm and dry  Neck:  JVP unable to assess  Chest:  Clear to auscultation   Cardiovascular:  RRR, normal S1S2, + murmur, no g/r  Abdomen:  Soft, nontender, +bowel sounds  Extremities:  1+ RLE, trace LLE edema, compression socks in place      Medications:    famotidine  20 mg Oral BID    bisacodyl  20 mg Rectal Once    clopidogrel  75 mg Oral Daily    sodium chloride flush  10 mL Intravenous 2 times per day    polyethylene glycol  17 g Oral Daily    bisacodyl  5 mg Oral Daily    metoprolol tartrate  12.5 mg Oral BID    chlorhexidine  15 mL Mouth/Throat BID    digoxin  125 mcg Oral Daily    furosemide  40 mg Intravenous BID    magnesium oxide  400 mg Oral Daily    nitroGLYCERIN  1 patch Transdermal Daily    potassium chloride  10 mEq Oral TID WC    fondaparinux  2.5 mg Subcutaneous Daily    aspirin  81 mg Oral Daily    mupirocin   Nasal BID    atorvastatin  80 mg Oral Nightly    venlafaxine  75 mg Oral Daily with breakfast    budesonide-formoterol  2 puff Inhalation BID      dextrose         Lab Data: Lab results independently reviewed by myself 20   CBC:   Recent Labs     20  0515 09/28/20  0605   WBC 21.6* 17.1* 18.2*   HGB 12.8* 12.0* 12.4*   * 91* 112*     BMP:    Recent Labs     20  0520 20  0515 20  0605    136 140   K 4.6 4.0 4.7   CO2 25 29 34*   BUN 13 10 17   CREATININE 0.9 0.8* 0.7*     INR:    Recent Labs     20   INR 1.35*     BNP:  No results for input(s): PROBNP in the last 72 hours. Cardiac Enzymes: No results for input(s): TROPONINI in the last 72 hours. Lipids:   Lab Results   Component Value Date    TRIG 173 2020    TRIG 217 2020    HDL 34 2020    HDL 33 2020    LDLCALC 16 2020    LDLCALC 66 2020       Cardiac Imagin/25/20 Urgent coronary artery bypass grafting surgery x3 with a single greater saphenous vein graft to the obtuse marginal branch of the circumflex, separate single greater saphenous vein graft to the first diagonal branch of the LAD, pedicled left internal artery to the LAD. Wynot-Augusto catheter placement. Cardiopulmonary bypass.  Endoscopic vein harvesting of the right greater saphenous vein. Transesophageal echo. Epiaortic ultrasound. Doppler verification of grafts. Bilateral five-level intercostal nerve block with Exparel. Platelet gel application     CARDIAC CATH 9/22/20:   Procedures Performed:   1. Left heart catheterization  2. Selective left and right coronary angiogram  3. Left ventriculography    Procedure Findings:  1. Severe multi vessel coronary artery diease  2. Normal left ventricular function with EF estimated at 55-60%  3. Normal left heart hemodynamics  Findings:   1. Left main coronary artery has moderate calcific disease. It gave off the left anterior descending artery and left circumflex. 2. Left anterior descending artery has severe atherosclerotic disease. It was moderate in size. It gave off a large early diagonal branch that has ostial 90%. The LAD covered the entire apex of the left ventricle. 3. Left circumflex has severe atherosclerotic disease. It was moderate in size. There was a moderate sized obtuse marginal branch that is occluded. 4. Right coronary artery has mild atherosclerotic disease. It was large in size and was the dominant artery. ~there is faint collaterals to the LAD   5. Left ventriculogram showed normal LVEF at 55-60%. Wall motion was normal . There was no significant mitral valve or aortic valve disease noted. LVEDP was normal. There was no gradient noted across the aortic valve during pullback of the catheter. CONCLUSIONS:   1. Severe multi-vessel coronary artery disease   ASSESSMENT/RECOMMENDATIONS:   1. Referral for CABG      STRESS MPI 9/21/20:     Conclusions  Summary  Increase left ventricular systolic volume with hypokinesis/decreased  myocardial thickening of the inferolateral segment. Large area of inducible  ischemia of the LAD and left circumflex territories. Post-stress LVEF normal  at 56%. Overall findings represent highly abnormal study, high risk scan.      ECHO 8/19/20:   Summary   LV systolic function is normal with EF estimated at 55-60%. Endocardium not   entirely well visualized but no obvious segmental wall motion abnormalities. There is moderate concentric left ventricular hypertrophy. Normal left ventricular diastolic filling pressure. Mild mitral annular calcification. Valves are not entirely well visualized but there is no obvious structural   abnormality or significant color flow abnormality noted on doppler. Unable to obtain SPAP due to lack of tricuspid regurgitation. ECHO 7/19/19  Summary   Left ventricular systolic function is normal with ejection fraction   estimated at 55%. No regional wall motion abnormalities. There is mild concentric left ventricular hypertrophy. Grade I diastolic dysfunction with normal left ventricular filling pressure.    Mild mitral regurgitation

## 2020-09-28 NOTE — PROGRESS NOTES
Physical Therapy    Facility/Department: [facility] B2 - ICU  Initial Assessment    NAME: Oli Beck  : 1960  MRN: [id number]    Date of Service: 2020    Discharge Recommendations:  Home with Home health PT, 24 hour supervision or assist   PT Equipment Recommendations  Equipment Needed: No    Assessment   Body structures, Functions, Activity limitations: Decreased functional mobility ; Decreased balance;Decreased endurance  Assessment: Pt is 65 yo male who presents with diagnosis of CAD s/p CABG x 3 on . Pt mod I with mobility at baseline. Grossly CGA to SBA for mobility this date with 4WW. Pt tolerated well but limited d/t decreased activity tolerance. Pt would benefit from continued skilled therapy to address deficits. Recommend home with 24-hr sup and home PT at d/c. Treatment Diagnosis: impaired functional mobility  Specific instructions for Next Treatment: progress mobility as tolerated  Prognosis: Good  Decision Making: Medium Complexity  PT Education: Goals; General Safety;Gait Training;PT Role;Plan of Care;Disease Specific Education; Functional Mobility Training;Home Exercise Program;Precautions;Transfer Training;Energy Conservation  Patient Education: Pt educated on sternal precautions and pursed lip breathing -- pt verbalized understanding  Barriers to Learning: none  REQUIRES PT FOLLOW UP: Yes  Activity Tolerance  Activity Tolerance: Patient Tolerated treatment well;Patient limited by fatigue  Activity Tolerance: Post gait training SpO2 88% on RA increasing to 92% with seated rest and cues for pursed lip breathing.        Patient Diagnosis(es): There were no encounter diagnoses.      has a past medical history of Anxiety, Aortic valve calcification, Chronic obstructive pulmonary disease (HCC), Coronary artery calcification, Crush injury of right foot, DDD (degenerative disc disease), lumbar, Erectile dysfunction, Fatigue, History of alcohol abuse, History of drug use, HNP (herniated nucleus pulposus), lumbar, Hyperglycemia, Hypersomnia, Kidney stone, Lumbar radiculopathy, Lumbar spine pain, LVH (left ventricular hypertrophy), Observed sleep apnea, Reactive depression, Spondylosis without myelopathy or radiculopathy, lumbar region, and Tobacco use.   has a past surgical history that includes Cholecystectomy; Cystoscopy (03/16/2011); Pain management procedure (Right, 12/24/2019); Pain management procedure (Right, 1/7/2020); and Coronary artery bypass graft (N/A, 9/25/2020).     Restrictions  Restrictions/Precautions  Restrictions/Precautions: General Precautions  Position Activity Restriction  Sternal Precautions: No Pushing, No Pulling, 5# Lifting Restrictions  Other position/activity restrictions: ambulate pt, dleuca  Vision/Hearing  Vision: Impaired  Vision Exceptions: Wears glasses for distance  Hearing: Within functional limits     Subjective  General  Chart Reviewed: Yes  Patient assessed for rehabilitation services?: Yes  Response To Previous Treatment: Not applicable  Family / Caregiver Present: No  Referring Practitioner: Dr. Joanie Garrido MD  Referral Date : 09/28/20  Diagnosis: CAD; s/p CABG x 3 9/25/2020  Follows Commands: Within Functional Limits  General Comment  Comments: Pt up in chair at start of session, RN cleared pt for therapy  Subjective  Subjective: pt agreeable to therapy  Pain Screening  Patient Currently in Pain: Denies    Orientation  Orientation  Overall Orientation Status: Within Functional Limits  Social/Functional History  Social/Functional History  Lives With: Spouse  Type of Home: House  Home Layout: One level  Home Access: Ramped entrance  Bathroom Shower/Tub: Walk-in shower  Bathroom Toilet: Handicap height  Bathroom Equipment: Grab bars in shower, Hand-held shower, Shower chair  Home Equipment: Cane, Rolling walker  ADL Assistance: Needs assistance(Wife assists with LE ADLs.)  Homemaking Responsibilities: No  Ambulation Assistance: Independent(no device)  Transfer Assistance: Independent  Active : Yes  Occupation: Part time employment  Type of occupation:     Objective  RLE AROM: WFL  LLE AROM : WFL  Strength RLE: WFL  Strength LLE: WFL        Bed mobility  Supine to Sit: Unable to assess  Sit to Supine: Unable to assess  Comment: Pt up in chair at start and end of session     Transfers  Sit to Stand: Contact guard assistance(Cues for hand placement to maintain sternal precautions)  Stand to sit: Contact guard assistance     Ambulation  Ambulation?: Yes  Ambulation 1  Surface: level tile  Device: Rollator  Assistance: Stand by assistance;Contact guard assistance  Quality of Gait: CGA progressing to SBA. Cues for posture, no LOB or unsteadiness  Gait Deviations: Decreased step length;Decreased step height  Distance: 125 ft     Balance  Sitting - Static: Good  Sitting - Dynamic: Good  Standing - Static: Good;-  Standing - Dynamic: Fair;+        Plan   Plan  Times per week: 5 out of 7 days/wk  Times per day: Daily  Specific instructions for Next Treatment: progress mobility as tolerated  Current Treatment Recommendations: Strengthening, Neuromuscular Re-education, Home Exercise Program, Safety Education & Training, Balance Training, Endurance Training, Transfer Training, Functional Mobility Training, Gait Training, Patient/Caregiver Education & Training  Safety Devices  Type of devices: All fall risk precautions in place, Call light within reach, Gait belt, Nurse notified, Left in chair(no chair alarm on approach)                                   AM-PAC Score  AM-PAC Inpatient Mobility Raw Score : 18 (09/28/20 1411)  AM-PAC Inpatient T-Scale Score : 43.63 (09/28/20 1411)  Mobility Inpatient CMS 0-100% Score: 46.58 (09/28/20 1411)  Mobility Inpatient CMS G-Code Modifier : CK (09/28/20 1411)          Goals  Short term goals  Time Frame for Short term goals: 5 days (10/02) unless otherwise specified  Short term goal 1: Pt will be mod I with bed mobility.   Short term goal 2: Pt will be supervision with transfers with LRAD. Short term goal 3: Pt will ambulate 150 ft with LRAD and supervision. Short term goal 4: 9/30: Pt will participate in 12-15 reps of BLE exercises to promote strength and activity tolerance. Patient Goals   Patient goals : \"to go home\"       Therapy Time   Individual Concurrent Group Co-treatment   Time In 0800         Time Out 0819         Minutes 19         Timed Code Treatment Minutes: [address], PT, DPT   If pt is unable to be seen after this session, please let this note serve as discharge summary. Please see case management note for discharge disposition. Thank you.

## 2020-09-28 NOTE — PROGRESS NOTES
CVTS Cardiothoracic Progress Note:                                CC:  Post op follow up     Surgery: 9/25/20 Urgent coronary artery bypass grafting surgery x3 with a single greater saphenous vein graft to the obtuse marginal branch of the circumflex, separate single greater saphenous vein graft to the first diagonal branch of the LAD, pedicled left internal artery to the LAD. [place] catheter placement. Cardiopulmonary bypass. Endoscopic vein harvesting of the right greater saphenous vein. Transesophageal echo. Epiaortic ultrasound. Doppler verification of grafts. Bilateral five-level intercostal nerve block with Exparel. Platelet gel application    Hospital course:   9/27 transitioned off of all drips, up in chair, appears comfortable.    9/28 up in chair, remains on 2L per NC    Subjective:   Dietary Intake: improving   no Nausea   Pain Control: controlled  Complaints: mild post op pain  Bowels: have not moved, +flatus    Past Medical History:   Diagnosis Date    Anxiety 1/6/2020    Aortic valve calcification 09/2020    seen on lung CT    Chronic obstructive pulmonary disease ([address] 9/3/2019    PFT done 9/03/19    Coronary artery calcification 09/2020    seen on lung ct    Crush injury of right foot 7/23/2015    DDD (degenerative disc disease), lumbar 1/6/2020    Erectile dysfunction 1/6/2020    Fatigue 1/6/2020    History of alcohol abuse 1/6/2020    History of drug use     HNP (herniated nucleus pulposus), lumbar 1/6/2020    Hyperglycemia 1/6/2020    Hypersomnia 1/6/2020    Kidney stone     Lumbar radiculopathy 1/6/2020    Lumbar spine pain 1/6/2020    LVH (left ventricular hypertrophy)     seen on echo 8/2020, moderate    Observed sleep apnea 1/6/2020    Reactive depression 1/6/2020    Spondylosis without myelopathy or radiculopathy, lumbar region 1/6/2020    Tobacco use 1/6/2020        Past Surgical History:   Procedure Laterality Date    CHOLECYSTECTOMY      CORONARY ARTERY BYPASS GRAFT N/A 9/25/2020    CORONARY ARTERY BYPASS GRAFTING X3, INTERNAL MAMMARY ARTERY, SAPHENOUS VEIN GRAFT, ON PUMP, STERNAL PLATING, 5 LEVEL BILATERAL INTERCOSTAL NERVE BLOCK, PLATELET GEL APPLICATION performed by Nicolasa Ospina MD at [address]  03/16/2011    cystoscopy left ureteroscopy, left retrograde, double J stent placement    PAIN MANAGEMENT PROCEDURE Right 12/24/2019    RIGHT LUMBAR FIVE SACRAL ONE TRANSFORAMINAL EPIDURAL STEROID INJECTION SITE CONFIRMED BY FLUOROSCOPY performed by Gilmar Villegas MD at [facility] Right 1/7/2020    RIGHT LUMBAR FOUR AND LUMBAR FIVE TRANSFORAMINAL EPIDURAL STEROID INJECTION SITE CONFIRMED BY FLUOROSCOPY performed by Gilmar Villegas MD at [place] [address] as of 09/22/2020 - Review Complete 09/22/2020   Allergen Reaction Noted    Codeine Itching 03/16/2011    Dilaudid [hydromorphone hcl]  05/17/2019        Patient Active Problem List   Diagnosis    Crush injury of right foot    Shortness of breath    Chest pain    Erectile dysfunction    Hyperglycemia    Anxiety    Depression    Tobacco abuse    History of alcohol abuse    Fatigue    OANH (obstructive sleep apnea)    Hypersomnia    Chronic obstructive pulmonary disease (HCC)    Lumbar radiculopathy    HNP (herniated nucleus pulposus), lumbar    Spondylosis without myelopathy or radiculopathy, lumbar region    DDD (degenerative disc disease), lumbar    Lumbar spine pain    PNA (pneumonia)    Pneumonia    Acute respiratory distress    Class 3 severe obesity with body mass index (BMI) of 45.0 to 49.9 in adult ([facility] [address]    Pneumonia, primary atypical    Acute respiratory failure with hypoxia (HCC)    Moderate protein-calorie malnutrition (HCC)    Acute respiratory failure (HCC)    Acute on chronic respiratory failure with hypoxemia (HCC)    Acute diastolic congestive heart failure (HCC)    Chronic diastolic congestive heart failure ([facility]    Hyperlipidemia    Essential hypertension    Insomnia    Morbid obesity with BMI of 45.0-49.9, adult (HCC)    Unstable angina (HCC)    Abnormal cardiovascular stress test    CAD in native artery        Vital Signs: BP (!) 156/83   Pulse 103   Temp 97.7 °F (36.5 °C) (Oral)   Resp 20   Ht 5' 4\" (1.626 m)   Wt 273 lb 13 oz (124.2 kg)   SpO2 95%   BMI 47.00 kg/m²  O2 Flow Rate (L/min): 1 L/min     Admission Weight: Weight: 287 lb (130.2 kg)    Weight on 9/25 (126.1 kg) Pre-op   Weight on 9/26 (132.9 kg)  9/27  129.8 kg  9/28  124.2 kg    Intake/Output:     Intake/Output Summary (Last 24 hours) at 9/28/2020 1015  Last data filed at 9/28/2020 0904  Gross per 24 hour   Intake 250 ml   Output 2250 ml   Net -2000 ml      Extubation Time: 9/25 @ 12:21   Transition Time: 9/27 @ 09:37    LABORATORY DATA:   CBC:   Recent Labs     09/26/20  0520 09/27/20  0515 09/28/20  0605   WBC 21.6* 17.1* 18.2*   HGB 12.8* 12.0* 12.4*   HCT 39.0* 36.1* 38.0*   MCV 90.4 89.7 91.0   * 91* 112*     BMP:   Recent Labs     09/26/20  0520 09/27/20  0515 09/28/20  0605    136 140   K 4.6 4.0 4.7    100 98*   CO2 25 29 34*   BUN 13 10 17   CREATININE 0.9 0.8* 0.7*     MG:    Recent Labs     09/25/20  1242 09/25/20  1830   MG 2.70* 2.40      PT/INR:   Recent Labs     09/26/20  0520   PROTIME 15.7*   INR 1.35*     APTT:   Recent Labs     09/25/20  1450   APTT 21.7*     CXR: 9/25 expected post op findings   _______________________________________________________    Objective:   General appearance: awake, alert, easily conversational  Lungs: diminished in bases  Heart: S1S2 RRR; SR on monitor, HR 's  Chest: symmetrical expansion with inspiration and expirations; no rocking of sternum noted   Abdomen: round, soft, non-tender  Bowel sounds: normoactive  Kidneys: UOP  Wound/Incisions: Midsternal incision with dressing CDI; RLE incisions with dressings CDI;  Pacing wires removed 9/27  Extremities: BLE pulses palpable; minimal tibial swelling noted   Neurological: intact, non focal exam, speech strong  Chest tubes/Drains: Chest tubes removed 9/27    Assessment:   Post-op: 4 days. Condition: In stable condition. Plan:   1. Cardiovascular: s/p CABG- BB, ASA, Statin, Plavix     2. Pulmonary: needs expansion- IS, OOBTC, PT/OT, ambulation  Remains on 2L per NC. Will wean oxygen as tolerated. 3. Neurology: analgesia as needed    4. Nephrology: fluid mgt- diurese as tolerated. Continue IV lasix 40 mg BID. 5. Endocrinology: not diabetic, BG stable, d/c insulin. 6. Hematology: acute blood loss anemia; H&H stable    7. Microbiology:   Leukocytosis: WBC 18.2, likely reactionary, monitor. Will remove central line once PIV is established. 8. Nutrition: cardiac  Constipation: will give suppository today. Continue bowel regimen. 9. Labs: monitor    10. Post-op Drains/Wires: all out    11. D/C Goals: home with [facility] in next 1-2 days. 12. Continue post-op care of patient in the ICU    Meds:    The patient is on a beta-blocker   The patient is not on an ace-i/ARB- not indicated   The patient is on a statin   The patient is on oral antiplatelet therapy   ________________________________________________________________________  Yessenia Cota CNP  9/28/2020  10:15 AM  Note reviewed, events of last 24 hours reviewed along with vital signs and I/Os and patient examined. Assessment and plans discussed and are as outlined above.      Kamaljit Davis MD, FACS, [address], FACCP, [place]

## 2020-09-28 NOTE — PROGRESS NOTES
Occupational Therapy   Occupational Therapy Initial Assessment and Treatment Note  Date: 2020   Patient Name: Rowan Oro  MRN: [id number]     : 1960    Date of Service: 2020    Discharge Recommendations:  24 hour supervision or assist     Assessment   Performance deficits / Impairments: Decreased functional mobility ; Decreased ADL status  After evaluation, pt found to be presenting with the above mentioned occupational performance deficits which are affecting participation in daily living skills. Pt would benefit from continued skilled occupational therapy to address ADLs, functional mobility, and safety while in acute care. Prognosis: Good  Decision Making: Medium Complexity  OT Education: OT Role;Plan of Care;Precautions; ADL Adaptive Strategies;Transfer Training  Patient Education: disease specific ed:  role of OT, sternal precautions. Cont OT ed. REQUIRES OT FOLLOW UP: Yes  Activity Tolerance  Activity Tolerance: Patient Tolerated treatment well  Activity Tolerance: O2 sats 93%,   Safety Devices  Safety Devices in place: Yes  Type of devices: Gait belt;Call light within reach;Nurse notified; Left in chair         Patient Diagnosis(es): There were no encounter diagnoses. has a past medical history of Anxiety, Aortic valve calcification, Chronic obstructive pulmonary disease ([address] Coronary artery calcification, Crush injury of right foot, DDD (degenerative disc disease), lumbar, Erectile dysfunction, Fatigue, History of alcohol abuse, History of drug use, HNP (herniated nucleus pulposus), lumbar, Hyperglycemia, Hypersomnia, Kidney stone, Lumbar radiculopathy, Lumbar spine pain, LVH (left ventricular hypertrophy), Observed sleep apnea, Reactive depression, Spondylosis without myelopathy or radiculopathy, lumbar region, and Tobacco use.   has a past surgical history that includes Cholecystectomy; Cystoscopy (2011); Pain management procedure (Right, 2019);  Pain assistance  Standing Balance: Contact guard assistance(4WW)  Standing Balance  Activity: Ambulated in room and into hallway with 4WW and CGA. Returned to chair with CGA  Comment: Pt instructed on sternal precautions during mobility. ADL  Feeding: Independent;Setup  Grooming: Setup(seated)  LE Dressing: Maximum assistance  Toileting: Dependent/Total(deluca)  Tone RUE  RUE Tone: Normotonic  Tone LUE  LUE Tone: Normotonic  Coordination  Movements Are Fluid And Coordinated: Yes     Bed mobility  Supine to Sit: Unable to assess  Sit to Supine: Unable to assess  Comment: Pt seated in chair at start and end of session. Transfers  Stand Pivot Transfers: Contact guard assistance(4WW)  Sit to stand: Contact guard assistance  Stand to sit: Contact guard assistance     Cognition  Overall Cognitive Status: WFL        Sensation  Overall Sensation Status: (reports new onset of mild numbness in L hand)      LUE AROM (degrees)  LUE AROM : WFL  RUE AROM (degrees)  RUE AROM : WFL       Plan   Plan  Times per week: 4-5x in ICU  AM-PAC Score   AM-PAC Inpatient Daily Activity Raw Score: 15 (09/28/20 0916)  AM-PAC Inpatient ADL T-Scale Score : 34.69 (09/28/20 0916)  ADL Inpatient CMS 0-100% Score: 56.46 (09/28/20 0916)  ADL Inpatient CMS G-Code Modifier : CK (09/28/20 0916)  Goals  Short term goals  Time Frame for Short term goals: for 1 week (10/5) unless noted  Short term goal 1: Perform functional transfers with SBA with AD as needed  Short term goal 2: Perform LE dressing with min A  Short term goal 3: Report sternal precautions independently  Patient Goals   Patient goals : \"Go home\"     Therapy Time   Individual Concurrent Group Co-treatment   Time In 0759         Time Out 0819         Minutes 20         Timed Code Treatment Minutes: 10 Minutes(10 min eval)    If pt is discharged prior to next OT session, this note will serve as the discharge summary.   Mikey Cuello Ot

## 2020-09-28 NOTE — PROGRESS NOTES
4 Eyes Skin Assessment     The patient is being assess for   Shift Handoff    I agree that 2 RN's have performed a thorough Head to Toe Skin Assessment on the patient. ALL assessment sites listed below have been assessed. Areas assessed by both nurses:   [x]   Head, Face, and Ears   [x]   Shoulders, Back, and Chest, Abdomen  [x]   Arms, Elbows, and Hands   [x]   Coccyx, Sacrum, and Ischium  [x]   Legs, Feet, and Heels            **SHARE this note so that the co-signing nurse is able to place an eSignature**    Co-signer eSignature: Electronically signed by Rosibel Rangel RN on 9/27/20 at 9:21 PM EDT    Does the Patient have Skin Breakdown?   No          Maxime Prevention initiated:  No   Wound Care Orders initiated:  No      WOC nurse consulted for Pressure Injury (Stage 3,4, Unstageable, DTI, NWPT, Complex wounds)and New or Established Ostomies:  No      Primary Nurse eSignature: Electronically signed by Rosibel Rangel RN on 9/27/20 at 9:21 PM EDT

## 2020-09-28 NOTE — PLAN OF CARE
Ongoing  Goal: Chest tube drainage is within specified parameters  Description: Chest tube drainage is within specified parameters  9/27/2020 1010 by Madonna Ovalles RN  Outcome: Met This Shift     Problem: Gas Exchange - Impaired:  Goal: Levels of oxygenation will improve  Description: Levels of oxygenation will improve  9/27/2020 2121 by Aydee Marin RN  Outcome: Ongoing  9/27/2020 1010 by Madonna Ovalles RN  Outcome: Ongoing  Goal: Ability to maintain adequate ventilation will improve  Description: Ability to maintain adequate ventilation will improve  9/27/2020 1010 by Madonna Ovalles RN  Outcome: Met This Shift     Problem: Pain:  Goal: Pain level will decrease  Description: Pain level will decrease  9/27/2020 2121 by Aydee Marin RN  Outcome: Ongoing  9/27/2020 1010 by Madonna Ovalles RN  Outcome: Ongoing  Goal: Control of acute pain  Description: Control of acute pain  9/27/2020 1010 by Madonna Ovalles RN  Outcome: Ongoing  Goal: Control of chronic pain  Description: Control of chronic pain  9/27/2020 1010 by Madonna Ovalles RN  Outcome: Ongoing     Problem: Tissue Perfusion - Cardiopulmonary, Altered:  Goal: Absence of angina  Description: Absence of angina  9/27/2020 2121 by Aydee Marin RN  Outcome: Ongoing  9/27/2020 1010 by Madonna Ovalles RN  Outcome: Met This Shift  Goal: Hemodynamic stability will improve  Description: Hemodynamic stability will improve  9/27/2020 1010 by Madonna Ovalles RN  Outcome: Ongoing  Goal: Will show no evidence of cardiac arrhythmias  Description: Will show no evidence of cardiac arrhythmias  9/27/2020 1010 by Madonna Ovalles RN  Outcome: Met This Shift

## 2020-09-29 VITALS
DIASTOLIC BLOOD PRESSURE: 74 MMHG | BODY MASS INDEX: 47.61 KG/M2 | SYSTOLIC BLOOD PRESSURE: 146 MMHG | TEMPERATURE: 98.2 F | OXYGEN SATURATION: 95 % | HEART RATE: 104 BPM | RESPIRATION RATE: 20 BRPM | HEIGHT: 64 IN | WEIGHT: 278.88 LBS

## 2020-09-29 PROBLEM — I20.0 UNSTABLE ANGINA (HCC): Status: RESOLVED | Noted: 2020-09-22 | Resolved: 2020-09-29

## 2020-09-29 LAB
ANION GAP SERPL CALCULATED.3IONS-SCNC: 8 MMOL/L (ref 3–16)
BUN BLDV-MCNC: 23 MG/DL (ref 7–20)
CALCIUM SERPL-MCNC: 9.1 MG/DL (ref 8.3–10.6)
CHLORIDE BLD-SCNC: 96 MMOL/L (ref 99–110)
CO2: 33 MMOL/L (ref 21–32)
CREAT SERPL-MCNC: 0.9 MG/DL (ref 0.9–1.3)
GFR AFRICAN AMERICAN: >60
GFR NON-AFRICAN AMERICAN: >60
GLUCOSE BLD-MCNC: 106 MG/DL (ref 70–99)
HCT VFR BLD CALC: 37.9 % (ref 40.5–52.5)
HEMOGLOBIN: 12.5 G/DL (ref 13.5–17.5)
MAGNESIUM: 2.3 MG/DL (ref 1.8–2.4)
MCH RBC QN AUTO: 29.9 PG (ref 26–34)
MCHC RBC AUTO-ENTMCNC: 33 G/DL (ref 31–36)
MCV RBC AUTO: 90.7 FL (ref 80–100)
PDW BLD-RTO: 16 % (ref 12.4–15.4)
PLATELET # BLD: 172 K/UL (ref 135–450)
PMV BLD AUTO: 8.3 FL (ref 5–10.5)
POTASSIUM SERPL-SCNC: 4 MMOL/L (ref 3.5–5.1)
RBC # BLD: 4.18 M/UL (ref 4.2–5.9)
SODIUM BLD-SCNC: 137 MMOL/L (ref 136–145)
WBC # BLD: 12 K/UL (ref 4–11)

## 2020-09-29 PROCEDURE — 97530 THERAPEUTIC ACTIVITIES: CPT

## 2020-09-29 PROCEDURE — 36415 COLL VENOUS BLD VENIPUNCTURE: CPT

## 2020-09-29 PROCEDURE — 6370000000 HC RX 637 (ALT 250 FOR IP): Performed by: THORACIC SURGERY (CARDIOTHORACIC VASCULAR SURGERY)

## 2020-09-29 PROCEDURE — 2700000000 HC OXYGEN THERAPY PER DAY

## 2020-09-29 PROCEDURE — 99232 SBSQ HOSP IP/OBS MODERATE 35: CPT | Performed by: NURSE PRACTITIONER

## 2020-09-29 PROCEDURE — 85027 COMPLETE CBC AUTOMATED: CPT

## 2020-09-29 PROCEDURE — 2580000003 HC RX 258: Performed by: THORACIC SURGERY (CARDIOTHORACIC VASCULAR SURGERY)

## 2020-09-29 PROCEDURE — 6360000004 HC RX CONTRAST MEDICATION: Performed by: NURSE PRACTITIONER

## 2020-09-29 PROCEDURE — 94761 N-INVAS EAR/PLS OXIMETRY MLT: CPT

## 2020-09-29 PROCEDURE — 6360000002 HC RX W HCPCS: Performed by: THORACIC SURGERY (CARDIOTHORACIC VASCULAR SURGERY)

## 2020-09-29 PROCEDURE — 94640 AIRWAY INHALATION TREATMENT: CPT

## 2020-09-29 PROCEDURE — 80048 BASIC METABOLIC PNL TOTAL CA: CPT

## 2020-09-29 PROCEDURE — C8923 2D TTE W OR W/O FOL W/CON,CO: HCPCS

## 2020-09-29 PROCEDURE — 99024 POSTOP FOLLOW-UP VISIT: CPT | Performed by: THORACIC SURGERY (CARDIOTHORACIC VASCULAR SURGERY)

## 2020-09-29 PROCEDURE — 83735 ASSAY OF MAGNESIUM: CPT

## 2020-09-29 PROCEDURE — 6370000000 HC RX 637 (ALT 250 FOR IP): Performed by: NURSE PRACTITIONER

## 2020-09-29 PROCEDURE — 97116 GAIT TRAINING THERAPY: CPT

## 2020-09-29 RX ORDER — FUROSEMIDE 40 MG/1
40 TABLET ORAL DAILY
Qty: 14 TABLET | Refills: 0 | Status: SHIPPED | OUTPATIENT
Start: 2020-09-29 | End: 2020-10-28

## 2020-09-29 RX ORDER — CLOPIDOGREL BISULFATE 75 MG/1
75 TABLET ORAL DAILY
Qty: 30 TABLET | Refills: 0 | Status: SHIPPED | OUTPATIENT
Start: 2020-09-30 | End: 2020-10-28 | Stop reason: SDUPTHER

## 2020-09-29 RX ORDER — ATORVASTATIN CALCIUM 80 MG/1
80 TABLET, FILM COATED ORAL NIGHTLY
Qty: 30 TABLET | Refills: 0 | Status: SHIPPED | OUTPATIENT
Start: 2020-09-29 | End: 2020-10-28 | Stop reason: SDUPTHER

## 2020-09-29 RX ORDER — FUROSEMIDE 40 MG/1
40 TABLET ORAL DAILY
Status: DISCONTINUED | OUTPATIENT
Start: 2020-09-29 | End: 2020-09-29 | Stop reason: HOSPADM

## 2020-09-29 RX ORDER — OXYCODONE HYDROCHLORIDE 5 MG/1
5 TABLET ORAL EVERY 6 HOURS PRN
Qty: 28 TABLET | Refills: 0 | Status: SHIPPED | OUTPATIENT
Start: 2020-09-29 | End: 2020-10-06

## 2020-09-29 RX ORDER — FAMOTIDINE 20 MG/1
20 TABLET, FILM COATED ORAL DAILY
Qty: 30 TABLET | Refills: 0 | Status: SHIPPED | OUTPATIENT
Start: 2020-09-29 | End: 2020-11-10 | Stop reason: ALTCHOICE

## 2020-09-29 RX ORDER — ASPIRIN 81 MG/1
81 TABLET ORAL DAILY
Qty: 30 TABLET | Refills: 0 | Status: SHIPPED | OUTPATIENT
Start: 2020-09-30

## 2020-09-29 RX ORDER — DIGOXIN 125 MCG
125 TABLET ORAL DAILY
Qty: 21 TABLET | Refills: 0 | Status: SHIPPED | OUTPATIENT
Start: 2020-09-30 | End: 2020-10-28

## 2020-09-29 RX ORDER — POTASSIUM CHLORIDE 750 MG/1
10 TABLET, EXTENDED RELEASE ORAL 2 TIMES DAILY WITH MEALS
Qty: 28 TABLET | Refills: 0 | Status: SHIPPED | OUTPATIENT
Start: 2020-09-29 | End: 2020-10-28

## 2020-09-29 RX ADMIN — PERFLUTREN 2 MG: 6.52 INJECTION, SUSPENSION INTRAVENOUS at 06:42

## 2020-09-29 RX ADMIN — SODIUM CHLORIDE, PRESERVATIVE FREE 10 ML: 5 INJECTION INTRAVENOUS at 10:46

## 2020-09-29 RX ADMIN — CLOPIDOGREL BISULFATE 75 MG: 75 TABLET ORAL at 09:44

## 2020-09-29 RX ADMIN — ASPIRIN 81 MG: 81 TABLET ORAL at 09:44

## 2020-09-29 RX ADMIN — MUPIROCIN: 20 OINTMENT TOPICAL at 09:43

## 2020-09-29 RX ADMIN — POLYETHYLENE GLYCOL 3350 17 G: 17 POWDER, FOR SOLUTION ORAL at 09:44

## 2020-09-29 RX ADMIN — FUROSEMIDE 40 MG: 40 TABLET ORAL at 10:46

## 2020-09-29 RX ADMIN — OXYCODONE 10 MG: 5 TABLET ORAL at 09:45

## 2020-09-29 RX ADMIN — METOPROLOL TARTRATE 25 MG: 25 TABLET, FILM COATED ORAL at 09:44

## 2020-09-29 RX ADMIN — DIGOXIN 125 MCG: 125 TABLET ORAL at 09:44

## 2020-09-29 RX ADMIN — FAMOTIDINE 20 MG: 20 TABLET, FILM COATED ORAL at 09:44

## 2020-09-29 RX ADMIN — POTASSIUM CHLORIDE 10 MEQ: 750 TABLET, EXTENDED RELEASE ORAL at 12:49

## 2020-09-29 RX ADMIN — Medication 15 ML: at 09:43

## 2020-09-29 RX ADMIN — Medication 2 PUFF: at 09:19

## 2020-09-29 RX ADMIN — FONDAPARINUX SODIUM 2.5 MG: 2.5 INJECTION, SOLUTION SUBCUTANEOUS at 09:45

## 2020-09-29 RX ADMIN — VENLAFAXINE HYDROCHLORIDE 75 MG: 37.5 CAPSULE, EXTENDED RELEASE ORAL at 10:45

## 2020-09-29 RX ADMIN — POTASSIUM CHLORIDE 10 MEQ: 750 TABLET, EXTENDED RELEASE ORAL at 09:43

## 2020-09-29 RX ADMIN — BISACODYL 5 MG: 5 TABLET, COATED ORAL at 09:44

## 2020-09-29 ASSESSMENT — PAIN SCALES - GENERAL
PAINLEVEL_OUTOF10: 0
PAINLEVEL_OUTOF10: 7
PAINLEVEL_OUTOF10: 0
PAINLEVEL_OUTOF10: 5
PAINLEVEL_OUTOF10: 5
PAINLEVEL_OUTOF10: 0
PAINLEVEL_OUTOF10: 0

## 2020-09-29 ASSESSMENT — PAIN DESCRIPTION - LOCATION
LOCATION: CHEST;STERNUM
LOCATION: CHEST;FLANK

## 2020-09-29 ASSESSMENT — PAIN DESCRIPTION - ORIENTATION: ORIENTATION: MID

## 2020-09-29 ASSESSMENT — PAIN DESCRIPTION - DESCRIPTORS: DESCRIPTORS: ACHING;SORE

## 2020-09-29 ASSESSMENT — PAIN DESCRIPTION - PAIN TYPE
TYPE: SURGICAL PAIN
TYPE: SURGICAL PAIN

## 2020-09-29 ASSESSMENT — PAIN DESCRIPTION - FREQUENCY: FREQUENCY: CONTINUOUS

## 2020-09-29 NOTE — PROGRESS NOTES
/72   Pulse 102   Temp 98.2 °F (36.8 °C) (Oral)   Resp 20   Ht 5' 4\" (1.626 m)   Wt 278 lb 14.1 oz (126.5 kg)   SpO2 96%   BMI 47.87 kg/m²  on room air. Pt was up in chair this a.m, to bedside commode and assisted back to bed currently. Pt complains of surgical sites pain \"7\", prn oxycodone given per pt request.  Pt denies shortness of breath. Lungs diminished. Pt with occasional productive cough. Incentive spirometer at bedside. Writer encouraged pt to use, explained importance with pt verbalizing understanding. Pt's wife at bedside. Sternal precautions reviewed with pt and pt's wife, no questions at this time. Surgical incisions with edges well approximated, no drainage noted. Sternal splint heart pillow with pt. Pt and pt's wife deny any needs at this time. Bedside table and call light within reach. Pt instructed to call out for needs and assistance. Will continue to monitor.   Sharron Sheets  9/29/2020

## 2020-09-29 NOTE — PROGRESS NOTES
 CORONARY ARTERY BYPASS GRAFT N/A 9/25/2020    CORONARY ARTERY BYPASS GRAFTING X3, INTERNAL MAMMARY ARTERY, SAPHENOUS VEIN GRAFT, ON PUMP, STERNAL PLATING, 5 LEVEL BILATERAL INTERCOSTAL NERVE BLOCK, PLATELET GEL APPLICATION performed by Izzy Oshea MD at [address] [facility]  03/16/2011    cystoscopy left ureteroscopy, left retrograde, double J stent placement    PAIN MANAGEMENT PROCEDURE Right 12/24/2019    RIGHT LUMBAR FIVE SACRAL ONE TRANSFORAMINAL EPIDURAL STEROID INJECTION SITE CONFIRMED BY FLUOROSCOPY performed by Kamari Mitchell MD at [address] [facility] Right 1/7/2020    RIGHT LUMBAR FOUR AND LUMBAR FIVE TRANSFORAMINAL EPIDURAL STEROID INJECTION SITE CONFIRMED BY FLUOROSCOPY performed by Kamari Mitchell MD at [place] [address] as of 09/22/2020 - Review Complete 09/22/2020   Allergen Reaction Noted    Codeine Itching 03/16/2011    Dilaudid [hydromorphone hcl]  05/17/2019        Patient Active Problem List   Diagnosis    Crush injury of right foot    Shortness of breath    Chest pain    Erectile dysfunction    Hyperglycemia    Anxiety    Depression    Tobacco abuse    History of alcohol abuse    Fatigue    OANH (obstructive sleep apnea)    Hypersomnia    Chronic obstructive pulmonary disease (HCC)    Lumbar radiculopathy    HNP (herniated nucleus pulposus), lumbar    Spondylosis without myelopathy or radiculopathy, lumbar region    DDD (degenerative disc disease), lumbar    Lumbar spine pain    PNA (pneumonia)    Pneumonia    Acute respiratory distress    Class 3 severe obesity with body mass index (BMI) of 45.0 to 49.9 in adult ([facility] [address]    Pneumonia, primary atypical    Acute respiratory failure with hypoxia (HCC)    Moderate protein-calorie malnutrition (HCC)    Acute respiratory failure (HCC)    Acute on chronic respiratory failure with hypoxemia (HCC)    Acute diastolic congestive heart failure (HCC)    Hyperlipidemia    Insomnia    Morbid obesity with BMI of 45.0-49.9, adult (HCC)    Abnormal cardiovascular stress test    CAD in native artery        Vital Signs: /72   Pulse 102   Temp 98.2 °F (36.8 °C) (Oral)   Resp 20   Ht 5' 4\" (1.626 m)   Wt 278 lb 14.1 oz (126.5 kg)   SpO2 96%   BMI 47.87 kg/m²  O2 Flow Rate (L/min): 1 L/min     Admission Weight: Weight: 287 lb (130.2 kg)    Weight on 9/25 (126.1 kg) Pre-op   Weight on 9/26 (132.9 kg)  9/27  129.8 kg  9/28  124.2 kg  9/29  126.5 kg    Intake/Output:     Intake/Output Summary (Last 24 hours) at 9/29/2020 1032  Last data filed at 9/29/2020 0500  Gross per 24 hour   Intake 1200 ml   Output 400 ml   Net 800 ml      Extubation Time: 9/25 @ 12:21   Transition Time: 9/27 @ 09:37    LABORATORY DATA:   CBC:   Recent Labs     09/27/20  0515 09/28/20  0605 09/29/20  0607   WBC 17.1* 18.2* 12.0*   HGB 12.0* 12.4* 12.5*   HCT 36.1* 38.0* 37.9*   MCV 89.7 91.0 90.7   PLT 91* 112* 172     BMP:   Recent Labs     09/27/20  0515 09/28/20  0605 09/29/20  0607    140 137   K 4.0 4.7 4.0    98* 96*   CO2 29 34* 33*   BUN 10 17 23*   CREATININE 0.8* 0.7* 0.9     MG:    Recent Labs     09/29/20  0607   MG 2.30      CXR: 9/25 expected post op findings   _______________________________________________________    Objective:   General appearance: awake, alert, easily conversational  Lungs: diminished in bases  Heart: S1S2 RRR; SR on monitor, HR 90's  Chest: symmetrical expansion with inspiration and expirations; no rocking of sternum noted   Abdomen: round, soft, non-tender  Bowel sounds: normoactive  Kidneys: UOP satisfactory in 24 hrs; Cr 0.9  Wound/Incisions: Midsternal incision CDI; RLE incisions with dressings CDI; Pacing wires removed 9/27  Extremities: BLE pulses palpable; minimal tibial swelling noted   Neurological: intact, non focal exam, speech strong  Chest tubes/Drains: Chest tubes removed 9/27    Assessment:   Post-op: 5 days. Condition: In stable condition. Plan:   1. Cardiovascular: s/p CABG- BB, ASA, Statin, Plavix   Increasing BB to 25 mg BID    2. Pulmonary: needs expansion- IS, OOBTC, PT/OT, ambulation    3. Neurology: analgesia as needed    4. Nephrology: fluid mgt- diurese as tolerated. Continue PO lasix 40 mg daily for 14 days. 5. Endocrinology: not diabetic, BG stable. 6. Hematology: acute blood loss anemia; H&H stable    7. Microbiology:   Leukocytosis: resolving, WBC 12 (was 18.2), much improved after central line removed 9/28. 8. Nutrition: cardiac  Constipation: +bm, resolved. Continue bowel regimen. 9. Labs: monitor    10. Post-op Drains/Wires: all out    11. D/C Goals: home with Lainey Malathi today. 12. Continue post-op care of patient in the ICU    Meds:    The patient is on a beta-blocker   The patient is not on an ace-i/ARB- not indicated   The patient is on a statin   The patient is on oral antiplatelet therapy   ________________________________________________________________________  Fiona Heard CNP  9/29/2020  10:32 AM  Note reviewed, events of last 24 hours reviewed along with vital signs and I/Os and patient examined. Assessment and plans discussed and are as outlined above.      Alyssia Keys MD, FACS, [facility], FACCP, [place]

## 2020-09-29 NOTE — DISCHARGE INSTR - COC
IM, PF (6 mo and older Fluzone, Flulaval, Fluarix, and 3 yrs and older Afluria) 03/16/2020    Tdap (Boostrix, Adacel) 07/19/2015       Active Problems:  Patient Active Problem List   Diagnosis Code    Crush injury of right foot S97.81XA    Shortness of breath R06.02    Chest pain R07.9    Erectile dysfunction N52.9    Hyperglycemia R73.9    Anxiety F41.9    Depression F32.9    Tobacco abuse Z72.0    History of alcohol abuse F10.11    Fatigue R53.83    OANH (obstructive sleep apnea) G47.33    Hypersomnia G47.10    Chronic obstructive pulmonary disease (HCC) J44.9    Lumbar radiculopathy M54.16    HNP (herniated nucleus pulposus), lumbar M51.26    Spondylosis without myelopathy or radiculopathy, lumbar region M47.816    DDD (degenerative disc disease), lumbar M51.36    Lumbar spine pain M54.5    PNA (pneumonia) J18.9    Pneumonia J18.9    Acute respiratory distress R06.03    Class 3 severe obesity with body mass index (BMI) of 45.0 to 49.9 in adult ([facility] [address] E66.01, Z68.42    Pneumonia, primary atypical J18.9    Acute respiratory failure with hypoxia (HCC) J96.01    Moderate protein-calorie malnutrition (HCC) E44.0    Acute respiratory failure (HCC) J96.00    Acute on chronic respiratory failure with hypoxemia (HCC) O63.85    Acute diastolic congestive heart failure (HCC) I50.31    Hyperlipidemia E78.5    Insomnia G47.00    Morbid obesity with BMI of 45.0-49.9, adult (HCC) E66.01, Z68.42    Abnormal cardiovascular stress test R94.39    CAD in native artery I25.10       Isolation/Infection:   Isolation            No Isolation          Patient Infection Status       Infection Onset Added Last Indicated Last Indicated By Review Planned Expiration Resolved Resolved By    None active    Resolved    C-diff Rule Out 09/21/20 09/22/20 09/21/20 C. difficile toxin Molecular (Ordered)   09/22/20 Rule-Out Test Resulted    C-diff Rule Out 09/20/20 09/20/20 09/21/20 Clostridium difficile toxin/antigen (Ordered)   09/21/20 Rule-Out Test Resulted    COVID-19 Rule Out 20 COVID-19 (Ordered)   20 Rule-Out Test Resulted    COVID-19 Rule Out 20 COVID-19 (Ordered)   20 Rule-Out Test Resulted    INFLUENZA  20 Lawrence Paulson RN   20             Nurse Assessment:  Last Vital Signs: BP (!) 146/74   Pulse 104   Temp 98.2 °F (36.8 °C) (Oral)   Resp 20   Ht 5' 4\" (1.626 m)   Wt 278 lb 14.1 oz (126.5 kg)   SpO2 95%   BMI 47.87 kg/m²     Last documented pain score (0-10 scale): Pain Level: 5  Last Weight:   Wt Readings from Last 1 Encounters:   20 278 lb 14.1 oz (126.5 kg)     Mental Status:  {IP PT MENTAL STATUS::::0}    IV Access:  { ABI IV ACCESS:246804092:::0}    Nursing Mobility/ADLs:  Walking   {CHP DME ADLs:19628:::0}  Transfer  {CHP DME ADLs:061419965:::0}  Bathing  {CHP DME ADLs:250922441:::0}  Dressing  {CHP DME ADLs:177402023:::0}  Toileting  {CHP DME ADLs:239097621:::0}  Feeding  {CHP DME ADLs:406773984:::0}  Med Admin  {CHP DME ADLs:887446888:::0}  Med Delivery   { ABI MED Delivery:002397133:::0}    Wound Care Documentation and Therapy:        Elimination:  Continence: Bowel: {YES / VN:71876}  Bladder: {YES / EX:20574}  Urinary Catheter: {Urinary Catheter:966591107:::0}   Colostomy/Ileostomy/Ileal Conduit: {YES / RL:27873}       Date of Last BM: ***    Intake/Output Summary (Last 24 hours) at 2020 1455  Last data filed at 2020 1251  Gross per 24 hour   Intake 1760 ml   Output 725 ml   Net 1035 ml     I/O last 3 completed shifts: In: 4392 [P.O.:1420;  I.V.:30]  Out: 500 [Urine:500]    Safety Concerns:     [address] Safety Concerns:591651319:::0}    Impairments/Disabilities:      [address] Impairments/Disabilities:066360217:::0}    Nutrition Therapy:  Current Nutrition Therapy:   [address] Diet List:151652361:::0}    Routes of Feeding: {CHP DME Other Feedings:071219893:::0}  Liquids: {Slp liquid thickness:69355}  Daily Fluid Restriction: {CHP DME Yes amt example:264249991:::0}  Last Modified Barium Swallow with Video (Video Swallowing Test): {Done Not Done LPKT:989094050:::9}    Treatments at the Time of Hospital Discharge:   Respiratory Treatments: ***  Oxygen Therapy:  {Therapy; copd oxygen:89201:::0}  Ventilator:    {[facility] Vent List:947073034:::0}    Rehab Therapies: {THERAPEUTIC INTERVENTION:6239185491}  Weight Bearing Status/Restrictions: {[facility] Weight Bearin:::0}  Other Medical Equipment (for information only, NOT a DME order):  {EQUIPMENT:984261716}  Other Treatments: ***    Patient's personal belongings (please select all that are sent with patient):  {CHP DME Belongings:631595339:::0}    RN SIGNATURE:  {Esignature:508505886:::0}    CASE MANAGEMENT/SOCIAL WORK SECTION    Inpatient Status Date: ***    Readmission Risk Assessment Score:  Readmission Risk              Risk of Unplanned Readmission:        0           Discharging to Facility/ Agency   Name:   Address:  Phone:  Fax:    Dialysis Facility (if applicable)   Name:  Address:  Dialysis Schedule:  Phone:  Fax:    / signature: {Esignature:965724250:::0}    PHYSICIAN SECTION    Prognosis: Good    Condition at Discharge: Stable    Rehab Potential (if transferring to Rehab): Good    Recommended Labs or Other Treatments After Discharge:     Physician Certification: I certify the above information and transfer of Lynda Mckinney  is necessary for the continuing treatment of the diagnosis listed and that he requires Home Care for less 30 days.      Update Admission H&P: No change in H&P    PHYSICIAN SIGNATURE:  Electronically signed by Briseyda Reyez MD on 20 at 2:55 PM EDT

## 2020-09-29 NOTE — PROGRESS NOTES
Pt's wife picked up medications from outpatient pharmacy. Pt transport to exit via wheel chair with wife. Pt and pt's wife have no questions regarding discharge instructions reviewed earlier by RN, Ros Martel.  made aware of discharge.   Bryanna Wong  9/29/2020

## 2020-09-29 NOTE — PROGRESS NOTES
Physical Therapy  Facility/Department: [facility] B2 - ICU  Daily Treatment Note/Discharge Summary  NAME: Iesha Pisano  : 1960  MRN: [id number]    Date of Service: 2020    Discharge Recommendations:  Home with Home health PT, 24 hour supervision or assist   PT Equipment Recommendations  Equipment Needed: No    Assessment   Body structures, Functions, Activity limitations: Decreased functional mobility ; Decreased balance;Decreased endurance  Assessment: Pt with improved strength and functional mobility today vs. yesterday, ambulating increased distance and performing sit<>stand transfers with greater (I). Pt still requiring maxA x 1 for portions of bed mobility, although pt plans to sleep in lift recliner chair at home so bed mobility shouldn't be an issue. Pt scheduled to D/C home later today with recommended 24-hr sup/assist from family and home PT. Will sign off from an acute PT standpoint at this time. Treatment Diagnosis: Impaired functional mobility  Prognosis: Good  Decision Making: Medium Complexity  PT Education: Goals; General Safety;Gait Training;PT Role;Plan of Care;Disease Specific Education; Functional Mobility Training;Precautions;Transfer Training;Energy Conservation  Patient Education: Disease-specified education: Pt educated on sternal precautions and pursed lip breathing -- pt verbalized understanding. No Skilled PT: Safe to return home  REQUIRES PT FOLLOW UP: No  Activity Tolerance: Patient Tolerated treatment well;Patient limited by fatigue;Patient limited by endurance  Activity Tolerance: VSS during therapy session on room air. Patient Diagnosis(es): The encounter diagnosis was Acute post-operative pain.      has a past medical history of Anxiety, Aortic valve calcification, Chronic obstructive pulmonary disease (HCC), Coronary artery calcification, Crush injury of right foot, DDD (degenerative disc disease), lumbar, Erectile dysfunction, Fatigue, History of alcohol abuse, History Supervision  Bed to Chair: Supervision(without AD)     Ambulation  Surface: level tile  Device: No Device  Assistance: Supervision  Quality of Gait: Pt amb with slightly decreased foreign and stride length now compared to baseline. Appears fairly steady when amb without device, no LOB. Gait Deviations: Decreased step length;Decreased step height;Slow Foreign  Distance: x 150 feet    Stairs/Curb  Stairs?: No(pt states he has no steps he needs to manage at home)     Balance  Posture: Fair  Sitting - Static: Good  Sitting - Dynamic: Good  Standing - Static: Good  Standing - Dynamic: Good;-     Exercises  Comments: Deferred LE ther ex today due to impending D/C home after ambulation. AM-PAC Score  AM-PAC Inpatient Mobility Raw Score : 20 (09/29/20 1348)  AM-PAC Inpatient T-Scale Score : 47.67 (09/29/20 1348)  Mobility Inpatient CMS 0-100% Score: 35.83 (09/29/20 1348)  Mobility Inpatient CMS G-Code Modifier : CJ (09/29/20 1348)    Goals  Short term goals  Time Frame for Short term goals: 5 days (10/02) unless otherwise specified  Short term goal 1: Pt will be mod I with bed mobility - not met (maxA x 1 on 9/29/20; pt states he plans to sleep in lift recliner chair at home)  Short term goal 2: Pt will be supervision with transfers with LRAD - MET 9/29/20  Short term goal 3: Pt will ambulate 150 ft with LRAD and supervision - MET 9/29/20  Short term goal 4: 9/30: Pt will participate in 12-15 reps of BLE exercises to promote strength and activity tolerance - not met due to sooner-than-expected D/C  Patient Goals   Patient goals : \"to go home\"    Plan    Plan: D/C from acute PT services  Safety Devices:  All fall risk precautions in place, Call light within reach, Gait belt, Nurse notified, Left in chair     Therapy Time   Individual Concurrent Group Co-treatment   Time In 1035         Time Out 1100         Minutes 25         Timed Code Treatment Minutes: [address] [place], [place], [address]

## 2020-09-29 NOTE — PROGRESS NOTES
[facility]   Daily Progress Note    Admit Date:  9/22/2020  HPI:   Presented with chest pain. ECG and troponin negative for ischemia. Stress test with high risk abnormality. Hx of CHF (HFpEF), LVH, COPD, OANH non-compliant with CPAP, tobacco use. Transferred to [facility] for [address] [facility] [address] on 9/22/20 demonstrating severe MVCAD, referred for CABG. S/p CABG X 3 on 9/25/20. Subjective:  Mr. Estes Lay up in chair, ready for discharge. Feels even better today, just pain/ soreness from surgery. Reviewed echo results. Objective:   BP (!) 146/74   Pulse 104   Temp 98.2 °F (36.8 °C) (Oral)   Resp 20   Ht 5' 4\" (1.626 m)   Wt 278 lb 14.1 oz (126.5 kg)   SpO2 95%   BMI 47.87 kg/m²       Intake/Output Summary (Last 24 hours) at 9/29/2020 1333  Last data filed at 9/29/2020 1251  Gross per 24 hour   Intake 1760 ml   Output 725 ml   Net 1035 ml     Wt Readings from Last 3 Encounters:   09/29/20 278 lb 14.1 oz (126.5 kg)   09/22/20 287 lb 11.2 oz (130.5 kg)   08/28/20 286 lb (129.7 kg)         ASSESSMENT:   1. CAD - severe MV CAD, s/p CABG X3 on 9/25/20, recovering well  2. HTN - stable, monitor with diuresis  3. CHF, chronic diastolic - + fluid overload following surgery, diuresing well  4. HLD - LDL 66, on statin  5. COPD  6. OANH  7. Smoker      PLAN:  1. Continue dual antiplatelet therapy, statin and BB  2. Limited echo reviewed, normal LV function  3. Being discharged, has follow up appointment arranged.     ANICETO Long - CNP, 9/29/2020, 1:33 PM  [facility]   [phone]       Telemetry: SR 90's  NYHA: III    Physical Exam:  General:  Awake, alert, NAD  Skin:  Warm and dry  Neck:  JVP 8 cm upright  Chest:  Clear to auscultation   Cardiovascular:  RRR, normal S1S2, + murmur, no g/r  Abdomen:  Soft, nontender, +bowel sounds  Extremities:  1+ RLE, trace LLE edema, compression socks in place      Medications:    metoprolol tartrate  25 mg Oral BID    furosemide  40 mg Oral Daily    famotidine  20 mg Oral BID    bisacodyl  20 mg Rectal Once    clopidogrel  75 mg Oral Daily    sodium chloride flush  10 mL Intravenous 2 times per day    polyethylene glycol  17 g Oral Daily    bisacodyl  5 mg Oral Daily    chlorhexidine  15 mL Mouth/Throat BID    digoxin  125 mcg Oral Daily    magnesium oxide  400 mg Oral Daily    potassium chloride  10 mEq Oral TID WC    fondaparinux  2.5 mg Subcutaneous Daily    aspirin  81 mg Oral Daily    mupirocin   Nasal BID    atorvastatin  80 mg Oral Nightly    venlafaxine  75 mg Oral Daily with breakfast    budesonide-formoterol  2 puff Inhalation BID      dextrose         Lab Data: Lab results independently reviewed by myself 9/28/20   CBC:   Recent Labs     09/27/20  0515 09/28/20  0605 09/29/20  0607   WBC 17.1* 18.2* 12.0*   HGB 12.0* 12.4* 12.5*   PLT 91* 112* 172     BMP:    Recent Labs     09/27/20  0515 09/28/20  0605 09/29/20  0607    140 137   K 4.0 4.7 4.0   CO2 29 34* 33*   BUN 10 17 23*   CREATININE 0.8* 0.7* 0.9     INR:    No results for input(s): INR in the last 72 hours. BNP:  No results for input(s): PROBNP in the last 72 hours. Cardiac Enzymes: No results for input(s): TROPONINI in the last 72 hours. Lipids:   Lab Results   Component Value Date    TRIG 173 09/25/2020    TRIG 217 09/19/2020    HDL 34 09/25/2020    HDL 33 09/19/2020    LDLCALC 16 09/25/2020    LDLCALC 66 09/19/2020       Cardiac Imaging:   LIMITED ECHO 9/29/20:   Summary   Limited only f/u for LVEF post op CABG   Technically difficult examination. Normal left ventricular systolic function with ejection fraction of 55-60%. No regional wall motion abnormalites are seen. Compared to previous study from 8- no changes noted in left  ventricular function.       9/25/20 Urgent coronary artery bypass grafting surgery x3 with a single greater saphenous vein graft to the obtuse marginal branch of the circumflex, separate single greater saphenous vein graft to the first diagonal branch of the LAD, pedicled left internal artery to the LAD. [place] catheter placement. Cardiopulmonary bypass. Endoscopic vein harvesting of the right greater saphenous vein. Transesophageal echo. Epiaortic ultrasound. Doppler verification of grafts. Bilateral five-level intercostal nerve block with Exparel. Platelet gel application     CARDIAC CATH 9/22/20:   Procedures Performed:   1. Left heart catheterization  2. Selective left and right coronary angiogram  3. Left ventriculography    Procedure Findings:  1. Severe multi vessel coronary artery diease  2. Normal left ventricular function with EF estimated at 55-60%  3. Normal left heart hemodynamics  Findings:   1. Left main coronary artery has moderate calcific disease. It gave off the left anterior descending artery and left circumflex. 2. Left anterior descending artery has severe atherosclerotic disease. It was moderate in size. It gave off a large early diagonal branch that has ostial 90%. The LAD covered the entire apex of the left ventricle. 3. Left circumflex has severe atherosclerotic disease. It was moderate in size. There was a moderate sized obtuse marginal branch that is occluded. 4. Right coronary artery has mild atherosclerotic disease. It was large in size and was the dominant artery. ~there is faint collaterals to the LAD   5. Left ventriculogram showed normal LVEF at 55-60%. Wall motion was normal . There was no significant mitral valve or aortic valve disease noted. LVEDP was normal. There was no gradient noted across the aortic valve during pullback of the catheter. CONCLUSIONS:   1. Severe multi-vessel coronary artery disease   ASSESSMENT/RECOMMENDATIONS:   1. Referral for CABG      STRESS MPI 9/21/20:     Conclusions  Summary  Increase left ventricular systolic volume with hypokinesis/decreased  myocardial thickening of the inferolateral segment.  Large area of inducible  ischemia of the LAD and

## 2020-09-29 NOTE — DISCHARGE SUMMARY
Cardiac, Vascular & Thoracic Surgery  Discharge Summary    Patient:  Mara Whaley 1960 [id number]   Admission Date:  9/22/2020  3:42 PM  Discharge Date:  9/29/2020    Principle Diagnosis:  Unstable angina [facility])    Secondary Diagnosis:  Principal Problem (Resolved):    Unstable angina ([facility]  Active Problems:    Depression    Tobacco abuse    History of alcohol abuse    Chronic obstructive pulmonary disease (HCC)    Class 3 severe obesity with body mass index (BMI) of 45.0 to 49.9 in adult [facility])    Hyperlipidemia    Abnormal cardiovascular stress test    CAD in native artery  Resolved Problems:    Chronic diastolic congestive heart failure ([facility]    Essential hypertension    LHC: 9/22/20 with Dr. Constanza Cook  Findings:   1. Left main coronary artery has moderate calcific disease. It   gave off the left anterior descending artery and left circumflex. 2. Left anterior descending artery has severe atherosclerotic   disease.  It was moderate in size. It gave off a large early   diagonal branch that has ostial 90%. The LAD covered the entire   apex of the left ventricle. 3. Left circumflex has severe atherosclerotic disease.  It was   moderate in size. There was a moderate sized obtuse marginal   branch that is occluded. 4. Right coronary artery has mild atherosclerotic disease. It was   large in size and was the dominant artery.    ~there is faint collaterals to the LAD     5. Left ventriculogram showed normal LVEF at 55-60%. Wall motion   was normal . There was no significant mitral valve or aortic   valve disease noted. LVEDP was normal. There was no gradient   noted across the aortic valve during pullback of the catheter. Ht 5' 4\" (1.626 m)   Wt 287 lb (130.2 kg)   BMI 49.26 kg/m²     The access site was controlled with manual pressure and/or   appropriate closure device. CONCLUSIONS:  1. Severe multi-vessel coronary artery disease     ASSESSMENT/RECOMMENDATIONS:  1.  Referral for CABG Procedure: 9/25/20 Urgent coronary artery bypass grafting surgery x3 with a single greater saphenous vein graft to the obtuse marginal branch of the circumflex, separate single greater saphenous vein graft to the first diagonal branch of the LAD, pedicled left internal artery to the LAD. Shirley-Augusto catheter placement. Cardiopulmonary bypass. Endoscopic vein harvesting of the right greater saphenous vein. Transesophageal echo. Epiaortic ultrasound. Doppler verification of grafts. Bilateral five-level intercostal nerve block with Exparel. Platelet gel application     History:  The patient is a 64 y.o. super obese male, prior 5 PPD smoker (quit 10 days ago), with significant past medical history of COPD (wears 2L oxygen at night), DDD, and anxiety who presented with c/o chest pain to [facility] and was transferred to [facility] for further evaluation. Cardiology was consulted and during his work up performed a stress test, which was abnormal. He was transferred to [facility] for cardiac catheterization which revealed multivessel CAD not amenable to PCI. We have been consulted for surgical revascularization. Hospital Course: The post operative period for this patient was uncomplicated.  The patient was discharged on 9/29/2020    Discharged Condition: stable    Disposition:  home     Medications:  Aspirin:start  ADP Inhibitor (plavix/brillinta/effient):start  Betablocker:start  Statin:increase    Discharge Medications:   [place]   Home Medication Instructions CFB:[id number]    Printed on:09/29/20 7605   Medication Information                      albuterol (PROVENTIL) (2.5 MG/3ML) 0.083% nebulizer solution  Take 3 mLs by nebulization every 6 hours as needed for Wheezing or Shortness of Breath             albuterol sulfate HFA (PROVENTIL HFA) 108 (90 Base) MCG/ACT inhaler  Inhale 2 puffs into the lungs every 6 hours as needed for Wheezing             aspirin 81 MG EC tablet  Take 1 tablet by mouth daily

## 2020-09-29 NOTE — CARE COORDINATION
Writer received call from Kathy with [facility] they are not in network with insurance. Call placed to Magaly Burris with [facility] and they are also not in network. Per patient last time he was in the hospital he was unable to get hhc due to his insurance however he is willing to accept. CM placed call to [facility] to see if they are able to accept. Spoke to Ele Schwartz who is checking with their billing department to see if they can assist.  Ele Schwartz to return call shortly. Writer also asked that Liberty Anderson with [facility] review information for possible acceptance. Awaiting a returned call from [id number] [place] [address].        Scout Cotton RN

## 2020-09-29 NOTE — PROGRESS NOTES
Pt lost IV access, unable to get on attempt, writer spoke with Shayy Rico NP, pt being discharged today, do not need to place another IV.   Shahida Ordonez  9/29/2020

## 2020-09-29 NOTE — CARE COORDINATION
Writer received call from [place] with [facility] and the patient DOES NOT have any [facility] coverage. He will not be able to have any coverage for [employer]. Poppy Means NP and aware. Ok with discharge without services. CASE MANAGEMENT DISCHARGE SUMMARY      Discharge to: home     Transportation: private   Family/car: wife at bedside to transport home. Confirmed discharge plan with:   Patient: yes   Family, name and contact number:  Wife at bedside and aware of discharge plans.  Kirill Jaeger RN

## 2020-09-29 NOTE — PROGRESS NOTES
Pt's discharge instructions have been reviewed with the pt and his wife. Both verbalize understanding and all questions answered. All post-op open heart teaching has been completed and pt supplies sent home with pt.

## 2020-09-30 RX ORDER — VENLAFAXINE HYDROCHLORIDE 75 MG/1
CAPSULE, EXTENDED RELEASE ORAL
Qty: 30 CAPSULE | Refills: 0 | Status: SHIPPED | OUTPATIENT
Start: 2020-09-30 | End: 2020-10-30 | Stop reason: SDUPTHER

## 2020-10-05 ENCOUNTER — TELEPHONE (OUTPATIENT)
Dept: PULMONOLOGY | Age: 60
End: 2020-10-05

## 2020-10-05 NOTE — TELEPHONE ENCOUNTER
Within this Telehealth Consent, the terms you and yours refer to the person using the Telehealth Service (Service), or in the case of a use of the Service by or on behalf of a minor, you and yours refer to and include (i) the parent or legal guardian who provides consent to the use of the Service by such minor or uses the Service on behalf of such minor, and (ii) the minor for whom consent is being provided or on whose behalf the Service is being utilized. When using Service, you will be consulting with your health care providers via the use of Telehealth.   Telehealth involves the delivery of healthcare services using electronic communications, information technology or other means between a healthcare provider and a patient who are not in the same physical location. Telehealth may be used for diagnosis, treatment, follow-up and/or patient education, and may include, but is not limited to, one or more of the following:    Electronic transmission of medical records, photo images, personal health information or other data between a patient and a healthcare provider    Interactions between a patient and healthcare provider via audio, video and/or data communications    Use of output data from medical devices, sound and video files    Anticipated Benefits   The use of Telehealth by your Provider(s) through the Service may have the following possible benefits:    Making it easier and more efficient for you to access medical care and treatment for the conditions treated by such Provider(s) utilizing the Service    Allowing you to obtain medical care and treatment by Provider(s) at times that are convenient for you    Enabling you to interact with Provider(s) without the necessity of an in-office appointment     Possible Risks   While the use of Telehealth can provide potential benefits for you, there are also potential risks associated with the use of Telehealth.  These risks include, but may not be limited to the following:    Your Provider(s) may not able to provide medical treatment for your particular condition and you may be required to seek alternative healthcare or emergency care services.  The electronic systems or other security protocols or safeguards used in the Service could fail, causing a breach of privacy of your medical or other information.  Given regulatory requirements in certain jurisdictions, your Provider(s) diagnosis and/or treatment options, especially pertaining to certain prescriptions, may be limited. Acceptance   1. You understand that Services will be provided via Telehealth. This process involves the use of HIPAA compliant and secure, real-time audio-visual interfacing with a qualified and appropriately trained provider located at [facility]. 2. You understand that, under no circumstances, will this session be recorded. 3. You understand that the Provider(s) at [facility] and other clinical participants will be party to the information obtained during the Telehealth session in accordance with best medical practices. 4. You understand that the information obtained during the Telehealth session will be used to help determine the most appropriate treatment options. 5. You understand that You have the right to revoke this consent at any point in time. 6. You understand that Telehealth is voluntary, and that continued treatment is not dependent upon consent. 7. You understand that, in the event of non-consent to Telehealth services and/or technical difficulties, you will obtain services as typically provided in the absence of Telehealth technology. 8. You understand that this consent will be kept in Your medical record. 9. No potential benefits from the use of Telehealth or specific results can be guaranteed. Your condition may not be cured or improved and, in some cases, may get worse.    10. There are limitations in the provision of medical care and treatment via Telehealth and the Service and you may not be able to receive diagnosis and/or treatment through the Service for every condition for which you seek diagnosis and/or treatment. 11. There are potential risks to the use of Telehealth, including but not limited to the risks described in this Telehealth Consent. 12. Your Provider(s) have discussed the use of Telehealth and the Service with you, including the benefits and risks of such and you have provided oral consent to your Provider(s) for the use of Telehealth and the Service. 15. You understand that it is your duty to provide your Provider(s) truthful, accurate and complete information, including all relevant information regarding care that you may have received or may be receiving from other healthcare providers outside of the Service. 14. You understand that each of your Provider(s) may determine in his or sole discretion that your condition is not suitable for diagnosis and/or treatment using the Service, and that you may need to seek medical care and treatment a specialist or other healthcare provider, outside of the Service. 15. You understand that you are fully responsible for payment for all services provided by Provider(s) or through use of the Service and that you may not be able to use third-party insurance. 16. You represent that (a) you have read this Telehealth Consent carefully, (b) you understand the risks and benefits of the Service and the use of Telehealth in the medical care and treatment provided to you by Provider(s) using the Service, and (c) you have the legal capacity and authority to provide this consent for yourself and/or the minor for which you are consenting under applicable federal and state laws, including laws relating to the age of [de-identified] and/or parental/guardian consent.    17. You give your informed consent to the use of Telehealth by Provider(s) using the Service under the terms described in the Terms of Service and this Telehealth Consent. The patient was read the following statement and has consented to the visit as of 10/5/20. The patient has been scheduled for their first telehealth visit on 10/7/20 with .

## 2020-10-07 ENCOUNTER — VIRTUAL VISIT (OUTPATIENT)
Dept: PULMONOLOGY | Age: 60
End: 2020-10-07
Payer: COMMERCIAL

## 2020-10-07 PROCEDURE — 99442 PR PHYS/QHP TELEPHONE EVALUATION 11-20 MIN: CPT | Performed by: INTERNAL MEDICINE

## 2020-10-07 NOTE — PROGRESS NOTES
[facility]                                                            TELEHEALTH EVALUATION: Service performed was Audio (During [id number] public health emergency) and not a face-to-face visit         CHIEF COMPLAINT: Follow-up hospitalization      HPI:   Patient discharged 9/29/2020 after urgent coronary artery bypass grafting surgery x3. Doing better over all and recovery slowly  Little SOB  Cough with clear sputum  No hemoptysis   No smoking   Uses Albuterol once a day   Not able to afford his Spiriva     From prior visit:   64years old with chest pain being followed by Dr. Rosemarie Bassett here for sleep apnea evaluation and shortness of breath. Wakes up at night choking and gasping for air for 3.5 years, severe at times. Associated with observed sleep apnea and hypersomnia. No snoring. Better when he sits up and worse when laying down. No restorative sleep. + dry mouth upon awakening. Patient is complaining of daytime sleepiness, fatigue and tiredness at times during the day. Bedtime 8-10 pm and rise time is 5:30 am. Sleep onset 60 minutes. Watches TV in bedroom. + morning headache. 1 nocturia. Wakes up 4-5 times at night. 2-3 nap/weekf or 30-45 min. No car wrecks or near wrecks because of the sleepiness. No nodding off while driving. LEE for 3.5 years, mild. Dyspnea worse with exertion and better with resting. Albuterol helps his breathing. Uses 2-3 times/day. Associated with cough and wheezes. Able to walk mail box and back.  Smoker 1 ppd for the past 39 years- smokes now 1.5 ppd     Old records reviewed by me and summarizedc5        Past Medical History:   Diagnosis Date    Anxiety 1/6/2020    Aortic valve calcification 09/2020    seen on lung CT    Chronic obstructive pulmonary disease ([facility] 9/3/2019    PFT done 9/03/19    Coronary artery calcification 09/2020    seen on lung ct    Crush injury of right foot 7/23/2015    DDD (degenerative disc disease), lumbar 1/6/2020    Erectile dysfunction 1/6/2020    Fatigue 1/6/2020    History of alcohol abuse 1/6/2020    History of drug use     HNP (herniated nucleus pulposus), lumbar 1/6/2020    Hyperglycemia 1/6/2020    Hypersomnia 1/6/2020    Kidney stone     Lumbar radiculopathy 1/6/2020    Lumbar spine pain 1/6/2020    LVH (left ventricular hypertrophy)     seen on echo 8/2020, moderate    Observed sleep apnea 1/6/2020    Reactive depression 1/6/2020    Spondylosis without myelopathy or radiculopathy, lumbar region 1/6/2020    Tobacco use 1/6/2020       Past Surgical History:        Procedure Laterality Date    CHOLECYSTECTOMY      CORONARY ARTERY BYPASS GRAFT N/A 9/25/2020    CORONARY ARTERY BYPASS GRAFTING X3, INTERNAL MAMMARY ARTERY, SAPHENOUS VEIN GRAFT, ON PUMP, STERNAL PLATING, 5 LEVEL BILATERAL INTERCOSTAL NERVE BLOCK, PLATELET GEL APPLICATION performed by Izzy Oshea MD at [facility]  03/16/2011    cystoscopy left ureteroscopy, left retrograde, double J stent placement    PAIN MANAGEMENT PROCEDURE Right 12/24/2019    RIGHT LUMBAR FIVE SACRAL ONE TRANSFORAMINAL EPIDURAL STEROID INJECTION SITE CONFIRMED BY FLUOROSCOPY performed by Kamari Mitchell MD at [facility] Right 1/7/2020    RIGHT LUMBAR FOUR AND LUMBAR FIVE TRANSFORAMINAL EPIDURAL STEROID INJECTION SITE CONFIRMED BY FLUOROSCOPY performed by Kamari Mitchell MD at [place] [id number]       Allergies:  is allergic to codeine and dilaudid [hydromorphone hcl]. Social History:    TOBACCO:   reports that he quit smoking about 7 months ago. His smoking use included cigarettes. He has a 70.00 pack-year smoking history. He has never used smokeless tobacco.  ETOH:   reports no history of alcohol use.       Family History:       Problem Relation Age of Onset    Heart Disease Mother     Other Mother         copd    Liver Disease Father     High Blood Pressure Sister  No Known Problems Sister        Current Medications:    Current Outpatient Medications:     venlafaxine (EFFEXOR XR) 75 MG extended release capsule, TAKE ONE CAPSULE BY MOUTH DAILY, Disp: 30 capsule, Rfl: 0    aspirin 81 MG EC tablet, Take 1 tablet by mouth daily, Disp: 30 tablet, Rfl: 0    atorvastatin (LIPITOR) 80 MG tablet, Take 1 tablet by mouth nightly, Disp: 30 tablet, Rfl: 0    metoprolol tartrate (LOPRESSOR) 25 MG tablet, Take 1 tablet by mouth 2 times daily Hold this medication for pulse <90 or systolic BP <087, Disp: 60 tablet, Rfl: 0    digoxin (LANOXIN) 125 MCG tablet, Take 1 tablet by mouth daily for 21 days, Disp: 21 tablet, Rfl: 0    furosemide (LASIX) 40 MG tablet, Take 1 tablet by mouth daily for 14 days, Disp: 14 tablet, Rfl: 0    bisacodyl (DULCOLAX) 5 MG EC tablet, Take 1 tablet by mouth daily for 7 days, Disp: 7 tablet, Rfl: 0    magnesium oxide (MAG-OX) 400 (241.3 Mg) MG TABS tablet, Take 1 tablet by mouth daily for 14 days, Disp: 14 tablet, Rfl: 0    potassium chloride (KLOR-CON M) 10 MEQ extended release tablet, Take 1 tablet by mouth 2 times daily (with meals) for 14 days, Disp: 28 tablet, Rfl: 0    clopidogrel (PLAVIX) 75 MG tablet, Take 1 tablet by mouth daily, Disp: 30 tablet, Rfl: 0    famotidine (PEPCID) 20 MG tablet, Take 1 tablet by mouth daily, Disp: 30 tablet, Rfl: 0    traZODone (DESYREL) 50 MG tablet, TAKE ONE TABLET BY MOUTH ONCE NIGHTLY AS NEEDED FOR SLEEP, Disp: 30 tablet, Rfl: 0    albuterol (PROVENTIL) (2.5 MG/3ML) 0.083% nebulizer solution, Take 3 mLs by nebulization every 6 hours as needed for Wheezing or Shortness of Breath, Disp: 25 vial, Rfl: 1    albuterol sulfate HFA (PROVENTIL HFA) 108 (90 Base) MCG/ACT inhaler, Inhale 2 puffs into the lungs every 6 hours as needed for Wheezing, Disp: 1 Inhaler, Rfl: 0      Objective:   PHYSICAL EXAM:    Telephone visit not able to obtain physical exam           DATA reviewed by me:   PFTs 09/30/2020 FVC 2.61 (68%) FEV1 1.96 (66%) FEV1/FVC 75% TLC 6.36 (105%) DLCO 22.12 (81%) 6MW F Res Ex    CT chest 9/18/2020 imaging reviewed by me and showed  No PE or aortic dissection  No adenopathy  No infiltrates or effusion      TSH 0.9    Assessment:       · Hypersomnia and observed sleep apnea  · COPD  · Chronic diastolic CHF   · CAD post bypass grafting surgery times 3 September 2020  · 45 pack year smoking- Quit march 2020         Plan:       PSG evaluate for sleep related breathing disorder. Complications of OANH if not treated were discussed with patient patient, including systemic hypertension, pulmonary hypertension, cardiovascular morbidities, car accidents and all cause mortality. Continue albuterol 2 puffs Q4-6 hrs PRN  Advised to get his Pneumococcal vaccine   Advised to get influenza vaccine this year   Advised to continue with smoking cessation      Manfred Yang is a 64 y.o. male evaluated via telephone on 10/7/2020. Consent:  He and/or health care decision maker is aware that that he may receive a bill for this telephone service, depending on his insurance coverage, and has provided verbal consent to proceed: Yes       Documentation:  I communicated with the patient and/or health care decision maker about: See above   Details of this discussion including any medical advice provided: See above       I Affirm this is a Patient Initiated Episode with an Established Patient who has not had a related appointment within my department in the past 7 days or scheduled within the next 24 hours.     Total Time: 11-20 minutes     Note: not billable if this call serves to triage the patient into an appointment for the relevant concern      Saleem Johnson

## 2020-10-13 ENCOUNTER — OFFICE VISIT (OUTPATIENT)
Dept: CARDIOTHORACIC SURGERY | Age: 60
End: 2020-10-13

## 2020-10-13 VITALS
HEIGHT: 64 IN | HEART RATE: 107 BPM | SYSTOLIC BLOOD PRESSURE: 126 MMHG | WEIGHT: 284 LBS | OXYGEN SATURATION: 97 % | BODY MASS INDEX: 48.49 KG/M2 | DIASTOLIC BLOOD PRESSURE: 64 MMHG | TEMPERATURE: 98.2 F

## 2020-10-13 PROCEDURE — 99024 POSTOP FOLLOW-UP VISIT: CPT | Performed by: THORACIC SURGERY (CARDIOTHORACIC VASCULAR SURGERY)

## 2020-10-13 NOTE — PROGRESS NOTES
Progress Note    CC:  Postoperative follow-up    S/P    surgery. Subjective:    He feels good. He has not felt this good in years he says. His eating and sleeping habits are improving appropriately. Some pain in his chest when he rolls over and tries to sleep on his side. His activity level is appropriate for this point in his recovery. He is anxious to get back to his business. Vital Signs:                                                 /64 (Site: Left Upper Arm, Position: Sitting, Cuff Size: Medium Adult)   Pulse 107   Temp 98.2 °F (36.8 °C) (Temporal)   Ht 5' 4\" (1.626 m)   Wt 284 lb (128.8 kg)   SpO2 97%   BMI 48.75 kg/m²        CV:   Regular rate and rhythm with no rubs or murmurs. Pulm: Clear lung fields with no rales or rhonchi. Incisions:    Sternal incision is clean, dry and intact. Sternum is stable. Abd:  Soft  Ext: Leg incision is clean, dry and intact. No peripheral edema. Assessment/Plan:  Overall doing very well post CABG surgery. I discussed secondary risk prevention for cardiovascular disease with the patient. The patient has received a copy of my protocol for the secondary risk prevention of cardiovascular disease. The patient may increase activities as he feels comfortable doing so. Follow-up with his PCP and Dr. Capps  his cardiologist as prescribed. Follow-up with me in 1 month.     Lenor Cabot, MD  10/13/2020  10:13 AM

## 2020-10-19 ENCOUNTER — APPOINTMENT (OUTPATIENT)
Dept: GENERAL RADIOLOGY | Age: 60
End: 2020-10-19
Payer: COMMERCIAL

## 2020-10-19 ENCOUNTER — HOSPITAL ENCOUNTER (EMERGENCY)
Age: 60
Discharge: HOME | End: 2020-10-19
Attending: EMERGENCY MEDICINE
Payer: COMMERCIAL

## 2020-10-19 ENCOUNTER — TELEPHONE (OUTPATIENT)
Dept: CARDIOTHORACIC SURGERY | Age: 60
End: 2020-10-19

## 2020-10-19 VITALS
DIASTOLIC BLOOD PRESSURE: 80 MMHG | RESPIRATION RATE: 16 BRPM | OXYGEN SATURATION: 98 % | HEART RATE: 86 BPM | SYSTOLIC BLOOD PRESSURE: 151 MMHG | TEMPERATURE: 98.3 F

## 2020-10-19 LAB
ANION GAP SERPL CALCULATED.3IONS-SCNC: 7 MMOL/L (ref 3–16)
APTT: 28.3 SEC (ref 24.2–36.2)
BASOPHILS ABSOLUTE: 0.1 K/UL (ref 0–0.2)
BASOPHILS RELATIVE PERCENT: 1 %
BUN BLDV-MCNC: 16 MG/DL (ref 7–20)
CALCIUM SERPL-MCNC: 9 MG/DL (ref 8.3–10.6)
CHLORIDE BLD-SCNC: 103 MMOL/L (ref 99–110)
CO2: 29 MMOL/L (ref 21–32)
CREAT SERPL-MCNC: 0.7 MG/DL (ref 0.9–1.3)
EKG ATRIAL RATE: 84 BPM
EKG ATRIAL RATE: 84 BPM
EKG DIAGNOSIS: NORMAL
EKG DIAGNOSIS: NORMAL
EKG P AXIS: 58 DEGREES
EKG P AXIS: 59 DEGREES
EKG P-R INTERVAL: 174 MS
EKG P-R INTERVAL: 180 MS
EKG Q-T INTERVAL: 330 MS
EKG Q-T INTERVAL: 364 MS
EKG QRS DURATION: 76 MS
EKG QRS DURATION: 78 MS
EKG QTC CALCULATION (BAZETT): 389 MS
EKG QTC CALCULATION (BAZETT): 430 MS
EKG R AXIS: -19 DEGREES
EKG R AXIS: -7 DEGREES
EKG T AXIS: 75 DEGREES
EKG T AXIS: 77 DEGREES
EKG VENTRICULAR RATE: 84 BPM
EKG VENTRICULAR RATE: 84 BPM
EOSINOPHILS ABSOLUTE: 0.4 K/UL (ref 0–0.6)
EOSINOPHILS RELATIVE PERCENT: 3.9 %
GFR AFRICAN AMERICAN: >60
GFR NON-AFRICAN AMERICAN: >60
GLUCOSE BLD-MCNC: 98 MG/DL (ref 70–99)
HCT VFR BLD CALC: 41.7 % (ref 40.5–52.5)
HEMOGLOBIN: 13.7 G/DL (ref 13.5–17.5)
INR BLD: 1.1 (ref 0.86–1.14)
LYMPHOCYTES ABSOLUTE: 2.7 K/UL (ref 1–5.1)
LYMPHOCYTES RELATIVE PERCENT: 24.4 %
MCH RBC QN AUTO: 29.2 PG (ref 26–34)
MCHC RBC AUTO-ENTMCNC: 32.8 G/DL (ref 31–36)
MCV RBC AUTO: 89.1 FL (ref 80–100)
MONOCYTES ABSOLUTE: 0.7 K/UL (ref 0–1.3)
MONOCYTES RELATIVE PERCENT: 6.5 %
NEUTROPHILS ABSOLUTE: 7.2 K/UL (ref 1.7–7.7)
NEUTROPHILS RELATIVE PERCENT: 64.2 %
PDW BLD-RTO: 15.3 % (ref 12.4–15.4)
PLATELET # BLD: 258 K/UL (ref 135–450)
PMV BLD AUTO: 7.5 FL (ref 5–10.5)
POTASSIUM SERPL-SCNC: 4.5 MMOL/L (ref 3.5–5.1)
PRO-BNP: 236 PG/ML (ref 0–124)
PROTHROMBIN TIME: 12.7 SEC (ref 10–13.2)
RBC # BLD: 4.68 M/UL (ref 4.2–5.9)
SODIUM BLD-SCNC: 139 MMOL/L (ref 136–145)
TROPONIN: 0.01 NG/ML
TROPONIN: <0.01 NG/ML
WBC # BLD: 11.2 K/UL (ref 4–11)

## 2020-10-19 PROCEDURE — 71045 X-RAY EXAM CHEST 1 VIEW: CPT

## 2020-10-19 PROCEDURE — 85730 THROMBOPLASTIN TIME PARTIAL: CPT

## 2020-10-19 PROCEDURE — 80048 BASIC METABOLIC PNL TOTAL CA: CPT

## 2020-10-19 PROCEDURE — 96375 TX/PRO/DX INJ NEW DRUG ADDON: CPT

## 2020-10-19 PROCEDURE — 96374 THER/PROPH/DIAG INJ IV PUSH: CPT

## 2020-10-19 PROCEDURE — 83880 ASSAY OF NATRIURETIC PEPTIDE: CPT

## 2020-10-19 PROCEDURE — 84484 ASSAY OF TROPONIN QUANT: CPT

## 2020-10-19 PROCEDURE — 99285 EMERGENCY DEPT VISIT HI MDM: CPT

## 2020-10-19 PROCEDURE — 93010 ELECTROCARDIOGRAM REPORT: CPT | Performed by: INTERNAL MEDICINE

## 2020-10-19 PROCEDURE — 85610 PROTHROMBIN TIME: CPT

## 2020-10-19 PROCEDURE — 96376 TX/PRO/DX INJ SAME DRUG ADON: CPT

## 2020-10-19 PROCEDURE — 6360000002 HC RX W HCPCS: Performed by: EMERGENCY MEDICINE

## 2020-10-19 PROCEDURE — 93005 ELECTROCARDIOGRAM TRACING: CPT | Performed by: EMERGENCY MEDICINE

## 2020-10-19 PROCEDURE — 36415 COLL VENOUS BLD VENIPUNCTURE: CPT

## 2020-10-19 PROCEDURE — 85025 COMPLETE CBC W/AUTO DIFF WBC: CPT

## 2020-10-19 PROCEDURE — 6360000002 HC RX W HCPCS

## 2020-10-19 RX ORDER — MORPHINE SULFATE 4 MG/ML
4 INJECTION, SOLUTION INTRAMUSCULAR; INTRAVENOUS ONCE
Status: COMPLETED | OUTPATIENT
Start: 2020-10-19 | End: 2020-10-19

## 2020-10-19 RX ORDER — MORPHINE SULFATE 4 MG/ML
INJECTION, SOLUTION INTRAMUSCULAR; INTRAVENOUS
Status: COMPLETED
Start: 2020-10-19 | End: 2020-10-19

## 2020-10-19 RX ORDER — LORAZEPAM 2 MG/ML
1 INJECTION INTRAMUSCULAR ONCE
Status: COMPLETED | OUTPATIENT
Start: 2020-10-19 | End: 2020-10-19

## 2020-10-19 RX ADMIN — MORPHINE SULFATE 4 MG: 4 INJECTION INTRAVENOUS at 14:49

## 2020-10-19 RX ADMIN — MORPHINE SULFATE 4 MG: 4 INJECTION, SOLUTION INTRAMUSCULAR; INTRAVENOUS at 14:49

## 2020-10-19 RX ADMIN — MORPHINE SULFATE 4 MG: 4 INJECTION, SOLUTION INTRAMUSCULAR; INTRAVENOUS at 15:44

## 2020-10-19 RX ADMIN — MORPHINE SULFATE 4 MG: 4 INJECTION INTRAVENOUS at 15:44

## 2020-10-19 RX ADMIN — LORAZEPAM 1 MG: 2 INJECTION INTRAMUSCULAR; INTRAVENOUS at 15:59

## 2020-10-19 ASSESSMENT — PAIN SCALES - GENERAL
PAINLEVEL_OUTOF10: 0
PAINLEVEL_OUTOF10: 5
PAINLEVEL_OUTOF10: 10
PAINLEVEL_OUTOF10: 5

## 2020-10-19 ASSESSMENT — PAIN DESCRIPTION - PROGRESSION: CLINICAL_PROGRESSION: GRADUALLY WORSENING

## 2020-10-19 ASSESSMENT — PAIN DESCRIPTION - FREQUENCY: FREQUENCY: CONTINUOUS

## 2020-10-19 ASSESSMENT — PAIN DESCRIPTION - PAIN TYPE: TYPE: ACUTE PAIN

## 2020-10-19 ASSESSMENT — PAIN DESCRIPTION - LOCATION: LOCATION: CHEST

## 2020-10-19 ASSESSMENT — PAIN DESCRIPTION - DESCRIPTORS: DESCRIPTORS: TIGHTNESS

## 2020-10-19 NOTE — TELEPHONE ENCOUNTER
Pt is s/p CABG x 3 on 9/25/20. Pt called to report that his BP has been 188/107 today and elevated 180/100 over the weekend. However, this afternoon he has been experiencing bilateral jaw pain with associated chest pressure. Pt instructed to proceed directly to the ER at [facility] per squad. Declines squad transport and will have neighbor transport him. Report called to MMO.

## 2020-10-19 NOTE — ED PROVIDER NOTES
digoxin (LANOXIN) 125 MCG tablet Take 1 tablet by mouth daily for 21 days, Disp-21 tablet,R-0Print      furosemide (LASIX) 40 MG tablet Take 1 tablet by mouth daily for 14 days, Disp-14 tablet,R-0Print      magnesium oxide (MAG-OX) 400 (241.3 Mg) MG TABS tablet Take 1 tablet by mouth daily for 14 days, Disp-14 tablet,R-0Print      potassium chloride (KLOR-CON M) 10 MEQ extended release tablet Take 1 tablet by mouth 2 times daily (with meals) for 14 days, Disp-28 tablet,R-0Print      clopidogrel (PLAVIX) 75 MG tablet Take 1 tablet by mouth daily, Disp-30 tablet,R-0Print      famotidine (PEPCID) 20 MG tablet Take 1 tablet by mouth daily, Disp-30 tablet,R-0Print      traZODone (DESYREL) 50 MG tablet TAKE ONE TABLET BY MOUTH ONCE NIGHTLY AS NEEDED FOR SLEEP, Disp-30 tablet,R-0Normal      albuterol (PROVENTIL) (2.5 MG/3ML) 0.083% nebulizer solution Take 3 mLs by nebulization every 6 hours as needed for Wheezing or Shortness of Breath, Disp-25 vial, R-1Print      albuterol sulfate HFA (PROVENTIL HFA) 108 (90 Base) MCG/ACT inhaler Inhale 2 puffs into the lungs every 6 hours as needed for Wheezing, Disp-1 Inhaler, R-0Print             ALLERGIES     Dilaudid [hydromorphone hcl] and Codeine    FAMILY HISTORY       Family History   Problem Relation Age of Onset    Heart Disease Mother     Other Mother         copd    Liver Disease Father     High Blood Pressure Sister     No Known Problems Sister           SOCIAL HISTORY       Social History     Socioeconomic History    Marital status:      Spouse name: None    Number of children: None    Years of education: None    Highest education level: None   Occupational History    None   Social Needs    Financial resource strain: None    Food insecurity     Worry: None     Inability: None    Transportation needs     Medical: None     Non-medical: None   Tobacco Use    Smoking status: Former Smoker     Packs/day: 2.00     Years: 35.00     Pack years: 70.00 Types: Cigarettes     Last attempt to quit: 3/1/2020     Years since quittin.6    Smokeless tobacco: Never Used   Substance and Sexual Activity    Alcohol use: No    Drug use: Not Currently     Comment: hx of drug use    Sexual activity: None   Lifestyle    Physical activity     Days per week: None     Minutes per session: None    Stress: None   Relationships    Social connections     Talks on phone: None     Gets together: None     Attends Sabianist service: None     Active member of club or organization: None     Attends meetings of clubs or organizations: None     Relationship status: None    Intimate partner violence     Fear of current or ex partner: None     Emotionally abused: None     Physically abused: None     Forced sexual activity: None   Other Topics Concern    None   Social History Narrative    None       SCREENINGS    Sawyer Coma Scale  Eye Opening: Spontaneous  Best Verbal Response: Oriented  Best Motor Response: Obeys commands  Porfirio Coma Scale Score: 15          PHYSICAL EXAM    (up to 7 for level 4, 8 or more for level 5)     ED Triage Vitals   BP Temp Temp src Pulse Resp SpO2 Height Weight   10/19/20 1445 10/19/20 1430 -- 10/19/20 1445 10/19/20 1445 10/19/20 1445 -- --   (!) 155/91 98.3 °F (36.8 °C)  87 13 98 %         Physical Exam    General appearance:  Cooperative. Obese  male mildly uncomfortable appearing  Skin:  Warm. Dry. Eye:  Extraocular movements intact. Ears, nose, mouth and throat:  Oral mucosa moist,  Neck:  Trachea midline. Heart:  Regular rate and rhythm  Perfusion:  intact  Respiratory:  Lungs clear to auscultation bilaterally. Respirations nonlabored. Abdominal:   Non distended. Nontender  Neurological:  Alert and oriented x 3.   Moves all extremities spontaneously  Musculoskeletal:   Normal ROM, no deformities          Psychiatric:  Normal mood      DIAGNOSTIC RESULTS       Labs Reviewed   BASIC METABOLIC PANEL - Abnormal; Notable for the following components:       Result Value    CREATININE 0.7 (*)     All other components within normal limits    Narrative:     Performed at:  [facility]  [address] [place] [address]. [facility], [facility]    Phone [phone]   CBC WITH AUTO DIFFERENTIAL - Abnormal; Notable for the following components:    WBC 11.2 (*)     All other components within normal limits    Narrative:     Performed at:  [facility]. [facility]  [address] [place] [address]. [place], [facility]    Phone [phone] - Abnormal; Notable for the following components:    Pro- (*)     All other components within normal limits    Narrative:     Performed at:  [facility]  [address] [place] [address]. [facility], [facility]    Phone [phone]   TROPONIN    Narrative:     Performed at:  [facility]  [address] [place] [address]. [facility], [facility] [place]    Phone [phone]    Narrative:     Performed at:  [facility]. [facility]  [address] [place] [address]. [facility], [address] [place] [address]   Phone [phone]   APTT    Narrative:     Performed at:  [facility]  [address] [place] [address]. [facility], [facility]   Phone [phone]   TROPONIN    Narrative:     Performed at:  [facility]. [facility]  [address] [place] [address]. [facility], [facility] [place]    Phone [phone]       Interpretation per the Radiologist below, if obtained/available at the time of this note:    XR CHEST PORTABLE   Final Result   Strandy bibasilar densities which are likely on the basis of atelectasis if   infectious symptoms are absent. Recent postoperative change from CABG with decreased prominence of the   mediastinum relative to examination from 4 weeks prior.              All other labs/imaging were within normal range or not returned as of this dictation. EMERGENCY DEPARTMENT COURSE and DIFFERENTIAL DIAGNOSIS/MDM:   Vitals:    Vitals:    10/19/20 1630 10/19/20 1645 10/19/20 1715 10/19/20 1849   BP: 95/60 111/78 (!) 166/96 (!) 151/80   Pulse: 93 92 94 86   Resp: 28 25 25 16   Temp:       SpO2: 91% 93% 93% 98%       EKG: Sinus rhythm with low voltage rate of 84 bpm.  Occasional fusion complexes. No ST elevation. Nonspecific lateral ST segment changes. Compared to prior ST segments somewhat more pronounced. Notably prior EKG was performed prior to his CABG. Patient presents emergency department today complaining of fairly typical sounding chest pain. Recent CABG. EKG with nonspecific changes slightly different from prior though notably this was before his CABG. Resting comfortably. Pain treated here. Chest x-ray ultimately unremarkable for any acute disease. Troponin was negative. Discussed the case with Dr. Jailyn Goodson from cardiology who is quite familiar with the patient. He states he has low concern for graft failure but did recommend repeat 3-hour troponin but if this was negative felt comfortable with outpatient management. Repeat troponin was ultimately negative. No concern for PE at present. On reevaluation patient symptoms have resolved he is feeling improved. Patient was reassured but encouraged to return for any worsening symptoms and otherwise will follow up with his previously scheduled outpatient surgery and cardiology appointments within the next week    MDM    CONSULTS     IP CONSULT TO CARDIOLOGY    Critical Care:   None    REASSESSMENT          PROCEDURE     Unless otherwise noted below, none     Procedures      FINAL IMPRESSION      1.  Chest pain, unspecified type            DISPOSITION/PLAN   DISPOSITION Decision To Discharge 10/19/2020 06:26:32 PM        PATIENT REFERRED TO:  ANICETO Hollis - CNP  [address] [facility],[address]  [phone]    Schedule an appointment as soon as possible for a visit         DISCHARGE MEDICATIONS:  Discharge Medication List as of 10/19/2020  6:37 PM        Controlled Substances Monitoring:     No flowsheet data found.     (Please note that portions of this note were completed with a voice recognition program.  Efforts were made to edit the dictations but occasionally words are mis-transcribed.)    Carlo Estrada MD (electronically signed)  Attending Emergency Physician            Vinicius Shields MD  10/20/20 2755

## 2020-10-19 NOTE — ED TRIAGE NOTES
Presents to triage for c/o chest pain since this a.m. Had 3 way bypass 1 month ago by . States his blood pressure has been elevated all weekend. States this a.m. he was awakened by chest pain, shortness of breath. States pain radiates into his jaw.

## 2020-10-20 ENCOUNTER — TELEPHONE (OUTPATIENT)
Dept: OTHER | Facility: CLINIC | Age: 60
End: 2020-10-20

## 2020-10-20 NOTE — TELEPHONE ENCOUNTER
[facility] Contacted [facility] office to schedule ED f/u appt. Spoke with Jermaine miguel, appt scheduled for Friday 10-30 @1:40pm with Dr. Ilya Daniels.

## 2020-10-20 NOTE — TELEPHONE ENCOUNTER
[facility] attempted to contact pt to schedule ED f/u appt. Attempt unsuccessful. Voicemail left for pt stating time and date of appt.

## 2020-10-26 PROBLEM — Z95.1 S/P THREE VESSEL CORONARY ARTERY BYPASS: Status: ACTIVE | Noted: 2020-10-26

## 2020-10-26 NOTE — PROGRESS NOTES
CARDIOLOGY FOLLOW-UP VISIT        Patient Name: Alia Sims  Primary Care physician: ANICETO Fleming CNP  Reason for Referral/Chief complaint:       Subjective:     Alia Sims is a 64 y.o.  who I initially met on presentation  to [facility] 9/25/20 at which time he complained of chest pain. The patient has prior history of congestive heart failure with admission in August of 2020, obesity, prior 5 pack per day smoker for 15-20 years, chronic obstructive pulmonary disease on nocturnal oxygen, obstructive sleep apnea non-compliant with CPAP. During admission in September for chest pain he was found to have an abnormal stress. He then began having rest pain in the afternoon 9/22 consistent with unstable angina. He was inititated on acute coronary syndrome therapies and transferred to [facility] for catheterization which revealed multi-vessel coronary artery disease. Subsequently, he underwent three-vessel bypass with Dr. Kumar Cristina, receiving a LIMA to LAD, SVG to OM and SVG to diagonal. Limited ECHO 9/29/20 confirmed Normal left ventricular systolic function with ejection fraction of 55-60%. No regional wall motion abnormalites. The patient was seen at an outside emergency department for chest discomfort status post his recovery from bypass. His work-up was reassuring at that time and he was discharged. He returns today for follow-up. He reports intermittent dyspnea with moderate exertion. He continues walking in the park 5 days a week for 1/2 mile at a time. He continues to ride a stationary bike 5 days a week for 30 minutes as well. Reports he was told by Dr. Radha Reyes office to stop Lipitor and lasix at his last appointment. he patient denies chest pain, shortness of breath at rest.  States that his sternal incision is healing well. He has right leg soreness from vein harvest but reports no purulent drainage and that it appears to be healing well.   He denies palpitations, dizziness, near-syncope or nancy syncope. Denies paroxysmal nocturnal dyspnea, orthopnea,  increasing lower extremity edema or weight gain. States that he is lost about 5 pounds. He does endorse bendopnea. His blood pressures have been running fine at home. He states his wife makes him weigh himself every day. Compliant with medications. No endorsed side effects. Home Medications:  Were reviewed and are listed in nursing record and/or below  Prior to Admission medications    Medication Sig Start Date End Date Taking?  Authorizing Provider   venlafaxine (EFFEXOR XR) 75 MG extended release capsule TAKE ONE CAPSULE BY MOUTH DAILY 9/30/20  Yes ANICETO Driver CNP   aspirin 81 MG EC tablet Take 1 tablet by mouth daily 9/30/20  Yes ANICETO Bryson CNP   atorvastatin (LIPITOR) 80 MG tablet Take 1 tablet by mouth nightly 9/29/20  Yes ANICETO Bryson CNP   metoprolol tartrate (LOPRESSOR) 25 MG tablet Take 1 tablet by mouth 2 times daily Hold this medication for pulse <42 or systolic BP <486 7/60/89  Yes ANICETO Bryson CNP   clopidogrel (PLAVIX) 75 MG tablet Take 1 tablet by mouth daily 9/30/20  Yes ANICETO Bryson CNP   famotidine (PEPCID) 20 MG tablet Take 1 tablet by mouth daily 9/29/20 10/29/20 Yes ANICETO Bryson CNP   traZODone (DESYREL) 50 MG tablet TAKE ONE TABLET BY MOUTH ONCE NIGHTLY AS NEEDED FOR SLEEP 8/7/20  Yes ANICETO Driver CNP   albuterol (PROVENTIL) (2.5 MG/3ML) 0.083% nebulizer solution Take 3 mLs by nebulization every 6 hours as needed for Wheezing or Shortness of Breath 9/25/19  Yes ANICETO Driver CNP   albuterol sulfate HFA (PROVENTIL HFA) 108 (90 Base) MCG/ACT inhaler Inhale 2 puffs into the lungs every 6 hours as needed for Wheezing 5/17/19  Yes Ambrosio Mathew MD   magnesium oxide (MAG-OX) 400 (241.3 Mg) MG TABS tablet Take 1 tablet by mouth daily for 14 days 9/30/20 10/14/20  ANICETO Bryson CNP large early diagonal branch that has ostial 90%. The LAD covered the entire apex of the left ventricle. 3. Left circumflex has severe atherosclerotic disease. It was moderate in size. There was a moderate sized obtuse marginal branch that is occluded. 4. Right coronary artery has mild atherosclerotic disease. It was large in size and was the dominant artery. ~there is faint collaterals to the LAD     5. Left ventriculogram showed normal LVEF at 55-60%. Wall motion was normal . There was no significant mitral valve or aortic valve disease noted. LVEDP was normal. There was no gradient noted across the aortic valve during pullback of the catheter. ECHO 8/19/20   Summary   LV systolic function is normal with EF estimated at 55-60%. Endocardium not   entirely well visualized but no obvious segmental wall motion abnormalities. There is moderate concentric left ventricular hypertrophy. Normal left ventricular diastolic filling pressure. Mild mitral annular calcification. Valves are not entirely well visualized but there is no obvious structural   abnormality or significant color flow abnormality noted on doppler. Unable to obtain SPAP due to lack of tricuspid regurgitation. Impression and Plan   Bernhard Oppenheim is a 64 y.o. with prior history of congestive heart failure with preserved LV function with admission in August of 2020, obesity, prior 5 pack per day smoker for 15-20 years, chronic obstructive pulmonary disease on nocturnal oxygen, obstructive sleep apnea non-compliant with CPAP. He presented in September 2020 with unstable angina and was transferred to [facility] for cardiac catheterization which disclosed multivessel disease. He subsequently underwent three-vessel bypass with Dr. Aye Neri and has recovered well. 1.  Coronary artery disease manifest with unstable angina status post bypass 9/2020  2. Heart failure with preserved ejection fraction  3. compression stockings. 4. Resume Lipitor daily at 40 mg nightly. We will recheck fasting lipid profile in 3 months at follow-up to ensure that LDL is not below 30 as this can be associated with some adverse outcomes in the literature. 5. Referral to cardiac rehab-[facility]   6. Take Blood pressure at home. Goal is 130/80 or less and he will call if he is persistently running higher. Follow up with me in 3 months unless concerns should arise prior. This note was scribed in the presence of Jaxon Pitts MD by Frankie Rico RN    The scribes documentation has been prepared under my direction and personally reviewed by me in its entirety. I confirm that the note above accurately reflects all work, treatment, procedures, and medical decision making performed by me. Rosalva Cage MD, personally performed the services described in this documentation as scribed by Frankie Rico RN in my presence, and it is both accurate and complete to the best of our ability. I will address the patient's cardiac risk factors and adjusted pharmacologic treatment as needed. In addition, I have reinforced the need for patient directed risk factor modification. All questions and concerns were addressed to the patient/family. Alternatives to my treatment were discussed. Thank you for allowing us to participate in the care of Dolorse Cortez. Please call me with any questions [phone].     Jaxon Pitts MD   Cardiovascular Disease  [facility]  [phone] [facility]  [phone] [address] [place] [address]  10/28/2020 9:07 AM

## 2020-10-28 ENCOUNTER — OFFICE VISIT (OUTPATIENT)
Dept: CARDIOLOGY CLINIC | Age: 60
End: 2020-10-28
Payer: COMMERCIAL

## 2020-10-28 VITALS
HEIGHT: 64 IN | HEART RATE: 84 BPM | WEIGHT: 282 LBS | BODY MASS INDEX: 48.14 KG/M2 | TEMPERATURE: 96.9 F | DIASTOLIC BLOOD PRESSURE: 80 MMHG | OXYGEN SATURATION: 96 % | SYSTOLIC BLOOD PRESSURE: 130 MMHG

## 2020-10-28 PROCEDURE — 99214 OFFICE O/P EST MOD 30 MIN: CPT | Performed by: INTERNAL MEDICINE

## 2020-10-28 RX ORDER — ATORVASTATIN CALCIUM 80 MG/1
80 TABLET, FILM COATED ORAL NIGHTLY
Qty: 90 TABLET | Refills: 3 | Status: SHIPPED | OUTPATIENT
Start: 2020-10-28 | End: 2021-12-21 | Stop reason: SDUPTHER

## 2020-10-28 RX ORDER — CLOPIDOGREL BISULFATE 75 MG/1
75 TABLET ORAL DAILY
Qty: 90 TABLET | Refills: 3 | Status: SHIPPED | OUTPATIENT
Start: 2020-10-28 | End: 2020-10-30 | Stop reason: SDUPTHER

## 2020-10-28 NOTE — PATIENT INSTRUCTIONS
Plan:  1. Resume Lipitor at 40mg daily. Discussed will remain on plavix for at least 1 year. Will take ASA 81mg life long. 2. Weight yourself daily if you gain 3-5lbs over a couple days call my office   3. Elevate feet when not active. Recommend compression socks   4. Referral to cardiac rehab- [facility]   5. Follow up with me in 3 months   6. Take Blood pressure at home.  Goal is 130/80 or less

## 2020-10-30 ENCOUNTER — OFFICE VISIT (OUTPATIENT)
Dept: FAMILY MEDICINE CLINIC | Age: 60
End: 2020-10-30
Payer: COMMERCIAL

## 2020-10-30 VITALS
DIASTOLIC BLOOD PRESSURE: 82 MMHG | HEIGHT: 64 IN | HEART RATE: 91 BPM | OXYGEN SATURATION: 95 % | SYSTOLIC BLOOD PRESSURE: 122 MMHG | WEIGHT: 285 LBS | BODY MASS INDEX: 48.65 KG/M2 | TEMPERATURE: 97.5 F

## 2020-10-30 PROCEDURE — 99215 OFFICE O/P EST HI 40 MIN: CPT | Performed by: NURSE PRACTITIONER

## 2020-10-30 PROCEDURE — 1111F DSCHRG MED/CURRENT MED MERGE: CPT | Performed by: NURSE PRACTITIONER

## 2020-10-30 RX ORDER — IBUPROFEN 800 MG/1
800 TABLET ORAL 3 TIMES DAILY
COMMUNITY
End: 2020-10-30 | Stop reason: SDUPTHER

## 2020-10-30 RX ORDER — VENLAFAXINE HYDROCHLORIDE 150 MG/1
150 CAPSULE, EXTENDED RELEASE ORAL DAILY
Qty: 30 CAPSULE | Refills: 5 | Status: SHIPPED | OUTPATIENT
Start: 2020-10-30 | End: 2021-03-01 | Stop reason: ALTCHOICE

## 2020-10-30 RX ORDER — CLOPIDOGREL BISULFATE 75 MG/1
75 TABLET ORAL DAILY
Qty: 30 TABLET | Refills: 1 | Status: SHIPPED | OUTPATIENT
Start: 2020-10-30 | End: 2021-01-13 | Stop reason: SDUPTHER

## 2020-10-30 RX ORDER — TRAZODONE HYDROCHLORIDE 50 MG/1
50 TABLET ORAL NIGHTLY
Qty: 30 TABLET | Refills: 1 | Status: SHIPPED | OUTPATIENT
Start: 2020-10-30 | End: 2021-03-01 | Stop reason: DRUGHIGH

## 2020-10-30 RX ORDER — IBUPROFEN 800 MG/1
800 TABLET ORAL 3 TIMES DAILY
Qty: 90 TABLET | Refills: 1 | Status: ON HOLD | OUTPATIENT
Start: 2020-10-30 | End: 2021-04-20 | Stop reason: HOSPADM

## 2020-10-30 NOTE — PROGRESS NOTES
Chief Complaint   Patient presents with   [address] from [facility]

## 2020-11-02 ENCOUNTER — HOSPITAL ENCOUNTER (OUTPATIENT)
Dept: CARDIAC REHAB | Age: 60
Setting detail: THERAPIES SERIES
Discharge: HOME | End: 2020-11-02
Payer: COMMERCIAL

## 2020-11-03 PROBLEM — J18.9 PNEUMONIA: Status: RESOLVED | Noted: 2020-03-09 | Resolved: 2020-11-03

## 2020-11-03 PROBLEM — J18.9 PNA (PNEUMONIA): Status: RESOLVED | Noted: 2020-03-09 | Resolved: 2020-11-03

## 2020-11-04 ENCOUNTER — APPOINTMENT (OUTPATIENT)
Dept: CARDIAC REHAB | Age: 60
End: 2020-11-04
Payer: COMMERCIAL

## 2020-11-06 ENCOUNTER — HOSPITAL ENCOUNTER (OUTPATIENT)
Dept: CARDIAC REHAB | Age: 60
Setting detail: THERAPIES SERIES
Discharge: HOME | End: 2020-11-06
Payer: COMMERCIAL

## 2020-11-06 PROCEDURE — 93798 PHYS/QHP OP CAR RHAB W/ECG: CPT

## 2020-11-09 ENCOUNTER — HOSPITAL ENCOUNTER (OUTPATIENT)
Dept: CARDIAC REHAB | Age: 60
Setting detail: THERAPIES SERIES
Discharge: HOME | End: 2020-11-09
Payer: COMMERCIAL

## 2020-11-09 PROCEDURE — 93798 PHYS/QHP OP CAR RHAB W/ECG: CPT

## 2020-11-09 NOTE — PROGRESS NOTES
Post-Discharge Transitional Care Management Services or Hospital Follow Up      Adalid Duckworth   YOB: 1960    Date of Office Visit:  10/30/2020  Date of Hospital Admission: 10/19/20  Date of Hospital Discharge: 10/19/20  Risk of hospital readmission (high >=14%.  Medium >=10%) :Readmission Risk Score: 25      Care management risk score Rising risk (score 2-5) and Complex Care (Scores >=6): 3     Non face to face  following discharge, date last encounter closed (first attempt may have been earlier): *No documented post hospital discharge outreach found in the last 14 days    Call initiated 2 business days of discharge: *No response recorded in the last 14 days--see telephone message on 10/20/20    Patient Active Problem List   Diagnosis    Crush injury of right foot    Shortness of breath    Chest pain    Erectile dysfunction    Hyperglycemia    Anxiety    Depression    Tobacco abuse    History of alcohol abuse    Fatigue    OANH (obstructive sleep apnea)    Hypersomnia    Chronic obstructive pulmonary disease (HCC)    Lumbar radiculopathy    HNP (herniated nucleus pulposus), lumbar    Spondylosis without myelopathy or radiculopathy, lumbar region    DDD (degenerative disc disease), lumbar    Lumbar spine pain    Acute respiratory distress    Class 3 severe obesity with body mass index (BMI) of 45.0 to 49.9 in adult ([address]    Pneumonia, primary atypical    Acute respiratory failure with hypoxia ([address]    Moderate protein-calorie malnutrition (HCC)    Acute respiratory failure ([address]    Acute on chronic respiratory failure with hypoxemia (HCC)    Acute diastolic congestive heart failure ([address]    Hyperlipidemia    Insomnia    Morbid obesity with BMI of 45.0-49.9, adult ([address]    Abnormal cardiovascular stress test    Coronary artery disease involving native coronary artery of native heart without angina pectoris    S/P three vessel coronary artery bypass       Allergies Allergen Reactions    Dilaudid [Hydromorphone Hcl] Nausea And Vomiting    Codeine Itching       Medications listed as ordered at the time of discharge from [facility] Medication Instructions MIKE:    Printed on:11/09/20 1042   Medication Information                      albuterol (PROVENTIL) (2.5 MG/3ML) 0.083% nebulizer solution  Take 3 mLs by nebulization every 6 hours as needed for Wheezing or Shortness of Breath             albuterol sulfate HFA (PROVENTIL HFA) 108 (90 Base) MCG/ACT inhaler  Inhale 2 puffs into the lungs every 6 hours as needed for Wheezing             aspirin 81 MG EC tablet  Take 1 tablet by mouth daily             atorvastatin (LIPITOR) 80 MG tablet  Take 1 tablet by mouth nightly             clopidogrel (PLAVIX) 75 MG tablet  Take 1 tablet by mouth daily             famotidine (PEPCID) 20 MG tablet  Take 1 tablet by mouth daily             ibuprofen (ADVIL;MOTRIN) 800 MG tablet  Take 1 tablet by mouth 3 times daily             metoprolol tartrate (LOPRESSOR) 25 MG tablet  Take 1 tablet by mouth 2 times daily Hold this medication for pulse <16 or systolic BP <833             traZODone (DESYREL) 50 MG tablet  Take 1 tablet by mouth nightly             venlafaxine (EFFEXOR XR) 150 MG extended release capsule  Take 1 capsule by mouth daily                   Medications marked \"taking\" at this time  Outpatient Medications Marked as Taking for the 10/30/20 encounter (Office Visit) with ANICETO Platt CNP   Medication Sig Dispense Refill    clopidogrel (PLAVIX) 75 MG tablet Take 1 tablet by mouth daily 30 tablet 1    traZODone (DESYREL) 50 MG tablet Take 1 tablet by mouth nightly 30 tablet 1    metoprolol tartrate (LOPRESSOR) 25 MG tablet Take 1 tablet by mouth 2 times daily Hold this medication for pulse <45 or systolic BP <165 90 tablet 3    venlafaxine (EFFEXOR XR) 150 MG extended release capsule Take 1 capsule by mouth daily 30 capsule 5    ibuprofen (ADVIL;MOTRIN) 800 MG tablet Take 1 tablet by mouth 3 times daily 90 tablet 1    atorvastatin (LIPITOR) 80 MG tablet Take 1 tablet by mouth nightly 90 tablet 3    aspirin 81 MG EC tablet Take 1 tablet by mouth daily 30 tablet 0    albuterol (PROVENTIL) (2.5 MG/3ML) 0.083% nebulizer solution Take 3 mLs by nebulization every 6 hours as needed for Wheezing or Shortness of Breath 25 vial 1    albuterol sulfate HFA (PROVENTIL HFA) 108 (90 Base) MCG/ACT inhaler Inhale 2 puffs into the lungs every 6 hours as needed for Wheezing 1 Inhaler 0        Medications patient taking as of now reconciled against medications ordered at time of hospital discharge: Yes    Chief Complaint   Patient presents with    Follow-Up from Hospital       History of Present illness - Follow up of Hospital diagnosis(es):   Cruz Faustin is a 64 y.o. (: 1960) here today for follow up from [place] [address] ED 10/19/20 for chest pain. It was determined it was from his anxiety. His blood pressure was elevated. He was given Ativan at felt much better. Had CABG on 20. Inpatient course: Discharge summary reviewed- see chart. Interval history/Current status: patient admitted to hospital on 20 with chest pain and found to have severe multivessel atherosclerotic disease resulting in urgent CABG x 3 on 20 with a right saphenous vein graft. Post op was uncomplicated and discharged from hospital on 20    Patient had follow up with cardiothoracic surgeon/ Dr Leticia Maher on 10/13/20 and is to follow up in 1 month  He is no longer smoking tobacco.  He is increasing activity as tolerated  Surgical incisions have healed well  He is watching diet more closely    Patient went to ER on 10/19/20 for \"chest pain\" and it was determined it was related to anxiety, which is a long standing problem for the patient. He was given ativan in ER which resolved the problem.   Patient is again requesting benzo to help with his anxiety  He is also having measures including regular sleep schedule, optimal sleep environment, and relaxing presleep rituals. Avoid daytime naps. Avoid caffeine after noon. Avoid excess alcohol. Avoid tobacco.  Recommended daily exercise. Trial of   - traZODone (DESYREL) 50 MG tablet; Take 1 tablet by mouth nightly  Dispense: 30 tablet; Refill: 1  Patient warned of potential sedative effects of medication and to refrain from driving, operating machinery, or participating in any activities that may require her full attention until she know the effects of the medications, call with any side effects or concerns with the medication. 2. Anxiety  start  - venlafaxine (EFFEXOR XR) 150 MG extended release capsule; Take 1 capsule by mouth daily  Dispense: 30 capsule; Refill: 5  Educated if patient develops SI/HI/lev to call 911 or go to ER. Discussed use, benefit, risks and side effects of prescribed medications. Barriers to compliance discussed. All patient questions answered. Pt voiced understanding. 3. Reactive depression  See above  - venlafaxine (EFFEXOR XR) 150 MG extended release capsule; Take 1 capsule by mouth daily  Dispense: 30 capsule; Refill: 5    4. S/P three vessel coronary artery bypass  Continue with cardiology  Continue smoking cessation  Work on diet  Increasing physical activity as tolerated  - MI DISCHARGE MEDS RECONCILED W/ CURRENT OUTPATIENT MED LIST    5. Morbid obesity with BMI of 45.0-49.9, adult ([address]   Discussed healthy lifestyle choices to attempt to incorporate into daily routine to attempt to obtain and maintain a healthy weight. General weight loss/lifestyle modification strategies discussed (elicit support from others; identify saboteurs; non-food rewards, etc). Discussed healthy nutritional options as well. Diet interventions: moderate (500 kCal/d) deficit diet and qualitative changes (increase low-fat,  high-fiber foods).   Disscussed trying to incorporate routine physical activity into daily regimen. Informal exercise measures discussed, e.g. taking stairs instead of elevator. Regular aerobic exercise program discussed. Surgical Procedure: was not discussed  Behavioral treatment: discussed commercial programs (Weight Watchers, Nutrisystem,etc), Overeaters Anonymous, Slim Fast, stress management and TOPS. Discussed consequences of obesity in terms of medical conditions that may develop in the future. Patient verbalized understanding.       - DC DISCHARGE MEDS RECONCILED W/ CURRENT OUTPATIENT MED LIST    6. Tobacco abuse  No longer using tobacco    7. Shortness of breath  Follow up with pulmo  - DC DISCHARGE MEDS RECONCILED W/ CURRENT OUTPATIENT MED LIST    8. Coronary artery disease involving native coronary artery of native heart without angina pectoris  Continue with medication regimen and working on  modifiable risk factors  - DC DISCHARGE MEDS RECONCILED W/ CURRENT OUTPATIENT MED LIST    9.  Hospital discharge follow-up  Extensively reviewed and discussed recent in patient hospital records, labs, imaging and consults with patient, discussed HPI and Plan, coordinated care and patient counseling provided at today's visit    - DC DISCHARGE MEDS RECONCILED W/ CURRENT OUTPATIENT MED LIST        Medical Decision Making: high complexity    Time spent: 40  Minutes, >50% of which was spent face to face with patient counseling, discussing HPI/plan/reviewing records and coordinating care

## 2020-11-10 ENCOUNTER — OFFICE VISIT (OUTPATIENT)
Dept: CARDIOTHORACIC SURGERY | Age: 60
End: 2020-11-10

## 2020-11-10 ENCOUNTER — TELEPHONE (OUTPATIENT)
Dept: CARDIOTHORACIC SURGERY | Age: 60
End: 2020-11-10

## 2020-11-10 ENCOUNTER — HOSPITAL ENCOUNTER (OUTPATIENT)
Dept: GENERAL RADIOLOGY | Age: 60
Discharge: HOME | End: 2020-11-10
Payer: COMMERCIAL

## 2020-11-10 ENCOUNTER — HOSPITAL ENCOUNTER (OUTPATIENT)
Age: 60
Discharge: HOME | End: 2020-11-10
Payer: COMMERCIAL

## 2020-11-10 VITALS
OXYGEN SATURATION: 94 % | HEIGHT: 64 IN | DIASTOLIC BLOOD PRESSURE: 76 MMHG | HEART RATE: 117 BPM | TEMPERATURE: 98.2 F | WEIGHT: 285 LBS | SYSTOLIC BLOOD PRESSURE: 122 MMHG | BODY MASS INDEX: 48.65 KG/M2

## 2020-11-10 PROCEDURE — 71046 X-RAY EXAM CHEST 2 VIEWS: CPT

## 2020-11-10 PROCEDURE — 99024 POSTOP FOLLOW-UP VISIT: CPT | Performed by: THORACIC SURGERY (CARDIOTHORACIC VASCULAR SURGERY)

## 2020-11-10 RX ORDER — ALPRAZOLAM 0.25 MG/1
0.25 TABLET ORAL NIGHTLY PRN
Qty: 30 TABLET | Refills: 0 | Status: SHIPPED | OUTPATIENT
Start: 2020-11-10 | End: 2020-12-10

## 2020-11-10 NOTE — PROGRESS NOTES
[facility], [facility]  Clinic Note     11/10/2020 9:34 AM  Surgeon:  Bronwyn Ivory     S/P : urgent CABG x3 on 9/25/20 with Dr. Randhawa Kalia     Chief complaint : 2nd post op  Subjective:  Mr. Evon Haynes is doing well post operatively. He does report continued shortness of breath with very minimal activity. Does have continued productive cough and reports very thick, white secretions. Trouble sleeping due to discomfort from mid sternal incision. Has tried melatonin and ibuprofen with no relief. Is currently participating in cardiac rehab and does report SOB sometimes hinders what he can do. Vital Signs: /76 (Site: Left Upper Arm, Position: Sitting, Cuff Size: Large Adult)   Pulse 117   Temp 98.2 °F (36.8 °C) (Temporal)   Ht 5' 4\" (1.626 m)   Wt 285 lb (129.3 kg)   SpO2 94%   BMI 48.92 kg/m²      I/O:  No intake or output data in the 24 hours ending 11/10/20 0934    Exam: No acute distress  Cardiovascular: S1-S2 is present  Pulmonary: Air entry bilaterally    Lower extremity: No lower leg edema  Incision: Intact    Chest X-Ray: Pending    Labs:   CBC: No results for input(s): WBC, HGB, HCT, MCV, PLT in the last 72 hours. BMP: No results for input(s): NA, K, CL, CO2, PHOS, BUN, CREATININE in the last 72 hours. Invalid input(s): CA  PT/INR: No results for input(s): PROTIME, INR in the last 72 hours. APTT: No results for input(s): APTT in the last 72 hours.     Scheduled Meds:     Patient Active Problem List   Diagnosis    Crush injury of right foot    Shortness of breath    Chest pain    Erectile dysfunction    Hyperglycemia    Anxiety    Depression    Tobacco abuse    History of alcohol abuse    Fatigue    OANH (obstructive sleep apnea)    Hypersomnia    Chronic obstructive pulmonary disease (HCC)    Lumbar radiculopathy    HNP (herniated nucleus pulposus), lumbar    Spondylosis without myelopathy or radiculopathy, lumbar region    DDD (degenerative disc disease), lumbar    Lumbar spine pain    Acute respiratory distress    Class 3 severe obesity with body mass index (BMI) of 45.0 to 49.9 in adult [facility])    Pneumonia, primary atypical    Acute respiratory failure with hypoxia (HCC)    Moderate protein-calorie malnutrition (HCC)    Acute respiratory failure (HCC)    Acute on chronic respiratory failure with hypoxemia (HCC)    Acute diastolic congestive heart failure (HCC)    Hyperlipidemia    Insomnia    Morbid obesity with BMI of 45.0-49.9, adult ([facility])    Abnormal cardiovascular stress test    Coronary artery disease involving native coronary artery of native heart without angina pectoris    S/P three vessel coronary artery bypass       Assessment/Plan:   I am seeing Mr. Isaias Solomon, on behalf of Dr. Yesenia Nugent who is on leave, he continues to do extremely well from a cardiac standpoint. He states \" this is the best I felt in years. \"  His main complaint is insomnia for which he mentions only a few hours of sleep at night despite taking melatonin. I explained to him that we will try low-dose Xanax as a sleep aid. Mr. Isaias Solomon also states he is having continued shortness of breath. I have ordered a chest x-ray to be reviewed today. I have also made recommendations for him to see his pulmonologist Dr. Epifanio Aiken. I do believe he needs to be evaluated for obstructive sleep apnea. Mr. Isaias Solomon does have home oxygen for which he states he does not wear. He also has a home pulse oximeter for which she states his saturations range around 94 to 96%. We discussed him wearing his home oxygen as needed for low saturations or subjective shortness of breath. He does have inhalers. In particular Mr. Isaias Solomon initially has been refusing to undergo the process of testing for obstructive sleep apnea, stating that he is \" very busy\". I have admonished him that this would be helpful study. He can follow-up with us as needed.     Lamar Brennan MD  Cardiothoracic surgery    Madelaine Santana. Emiliano Snider MD

## 2020-11-10 NOTE — TELEPHONE ENCOUNTER
As directed by Dr. Arvind Sparks, pt notified that his CXR is normal. Lungs clear. Encouraged to increase use of IS to aid with reducing SOB.   Voiced understanding.  Shelly Tate

## 2020-11-11 ENCOUNTER — HOSPITAL ENCOUNTER (OUTPATIENT)
Dept: CARDIAC REHAB | Age: 60
Setting detail: THERAPIES SERIES
Discharge: HOME | End: 2020-11-11
Payer: COMMERCIAL

## 2020-11-11 PROCEDURE — 93798 PHYS/QHP OP CAR RHAB W/ECG: CPT

## 2020-11-13 ENCOUNTER — HOSPITAL ENCOUNTER (OUTPATIENT)
Dept: CARDIAC REHAB | Age: 60
Setting detail: THERAPIES SERIES
Discharge: HOME | End: 2020-11-13
Payer: COMMERCIAL

## 2020-11-13 PROCEDURE — 93798 PHYS/QHP OP CAR RHAB W/ECG: CPT

## 2020-11-16 ENCOUNTER — TELEPHONE (OUTPATIENT)
Dept: CARDIOTHORACIC SURGERY | Age: 60
End: 2020-11-16

## 2020-11-16 ENCOUNTER — TELEPHONE (OUTPATIENT)
Dept: CARDIOLOGY CLINIC | Age: 60
End: 2020-11-16

## 2020-11-16 RX ORDER — FUROSEMIDE 40 MG/1
40 TABLET ORAL DAILY
Qty: 90 TABLET | Refills: 3 | Status: SHIPPED | OUTPATIENT
Start: 2020-11-16 | End: 2021-01-13

## 2020-11-16 NOTE — TELEPHONE ENCOUNTER
Spoke with patient. Message relayed per GLORIA. Lab order placed. Patient verbalized understanding. Patient is asking if he should continue with cardiac rehab this week? He reports he is very sob with any activity?

## 2020-11-16 NOTE — TELEPHONE ENCOUNTER
Advised he restart 40mg oral lasix daily. Will prescribed today. Continue to track weights daily and avoid salt (<2g daily) as well as restrict water intake to 64oz or less daily. These measures are to avoid hospitalization for volume overload. He should call should weights continue to rise despite implementing lasix. BMP Friday. Call with questions/concerns.      Bharati Adams MD   Cardiovascular Disease  [address]  [phone] [facility]  [phone] [address] [place] [address]

## 2020-11-16 NOTE — TELEPHONE ENCOUNTER
Spoke with patient. Results relayed. Verbalized understanding. Denied further questions at this time.

## 2020-11-16 NOTE — TELEPHONE ENCOUNTER
Cardiac rehab called, patient is up 11 pounds since Friday. Lungs are clear, BLE +1 pitting edema, facial swelling, increased SOB. He was taken off lasix 4 weeks ago by Dr Heaven Hui s/p CABG x3 9/25/2020          Plan:10/28/2020 Dr. Larry Moreno office note:   1. Discussed will remain on dual antiplatelet therapy for at least 1 year given presentation of unstable angina. Will continue ASA 81mg life long. 2.  Continue metoprolol at present dosing. Blood pressures remain at goal.    3.  Okay to continue off Lasix that was recently discontinued by Dr. Heaven Hui. Weigh yourself daily if you gain 3-5lbs over a couple days call my office. Likewise should he have increasing dyspnea with exertion or lower extremity edema he will call to discuss. Elevate feet when not active. Recommend compression stockings. 4. Resume Lipitor daily at 40 mg nightly. We will recheck fasting lipid profile in 3 months at follow-up to ensure that LDL is not below 30 as this can be associated with some adverse outcomes in the literature. 5. Referral to cardiac rehab-[place]   6. Take Blood pressure at home. Goal is 130/80 or less and he will call if he is persistently running higher.

## 2020-11-16 NOTE — TELEPHONE ENCOUNTER
Ok to continue with light activity. Being able to assess his BP/weights/rhythms with frequent evals with rehab is fruitful. If he does not feel significantly better in 48 hrs and able to participate in minimal rehab activities then may need to hold one session. But I do like that he is able to be assessed MWF via rehab if able.      GLORIA

## 2020-11-16 NOTE — TELEPHONE ENCOUNTER
Pt s/p CABG 9/25/20 and has completed his surgical follow up. Pt called today to report that he is increasingly short of breath requiring supplemental oxygen. States weight on 11/13 was 285# and this am 297#. He feels bloated and has water weight gain. Wanted to know what to do? States has called Dr. Aron Chan today x 2. Called Dr. Aron Chan' office. He is in procedure and will respond when available. Pt notified.  If his condition deteriorates then he is to proceed to the ER.  Arvin Leung

## 2020-11-20 ENCOUNTER — HOSPITAL ENCOUNTER (OUTPATIENT)
Age: 60
Discharge: HOME | End: 2020-11-20
Payer: COMMERCIAL

## 2020-11-20 LAB
ANION GAP SERPL CALCULATED.3IONS-SCNC: 9 MMOL/L (ref 3–16)
BUN BLDV-MCNC: 18 MG/DL (ref 7–20)
CALCIUM SERPL-MCNC: 9.2 MG/DL (ref 8.3–10.6)
CHLORIDE BLD-SCNC: 94 MMOL/L (ref 99–110)
CO2: 33 MMOL/L (ref 21–32)
CREAT SERPL-MCNC: 0.8 MG/DL (ref 0.9–1.3)
GFR AFRICAN AMERICAN: >60
GFR NON-AFRICAN AMERICAN: >60
GLUCOSE BLD-MCNC: 112 MG/DL (ref 70–99)
POTASSIUM SERPL-SCNC: 4.3 MMOL/L (ref 3.5–5.1)
SODIUM BLD-SCNC: 136 MMOL/L (ref 136–145)

## 2020-11-20 PROCEDURE — 36415 COLL VENOUS BLD VENIPUNCTURE: CPT

## 2020-11-20 PROCEDURE — 80048 BASIC METABOLIC PNL TOTAL CA: CPT

## 2020-11-23 ENCOUNTER — APPOINTMENT (OUTPATIENT)
Dept: CARDIAC REHAB | Age: 60
End: 2020-11-23
Payer: COMMERCIAL

## 2020-11-25 ENCOUNTER — APPOINTMENT (OUTPATIENT)
Dept: CARDIAC REHAB | Age: 60
End: 2020-11-25
Payer: COMMERCIAL

## 2020-11-27 ENCOUNTER — APPOINTMENT (OUTPATIENT)
Dept: CARDIAC REHAB | Age: 60
End: 2020-11-27
Payer: COMMERCIAL

## 2020-11-30 ENCOUNTER — APPOINTMENT (OUTPATIENT)
Dept: CARDIAC REHAB | Age: 60
End: 2020-11-30
Payer: COMMERCIAL

## 2021-01-07 ENCOUNTER — TELEPHONE (OUTPATIENT)
Dept: PULMONOLOGY | Age: 61
End: 2021-01-07

## 2021-01-07 ENCOUNTER — TELEPHONE (OUTPATIENT)
Dept: CARDIOLOGY CLINIC | Age: 61
End: 2021-01-07

## 2021-01-07 NOTE — TELEPHONE ENCOUNTER
Spoke to pt. Pt is having symptoms of SOB on exertion and sitting still, swelling bilateral ankles, feet, and legs, dizziness, and palps. Pt's BP at 8:30 am 193/119 HR 90 before taking any medication. BP this afternoon at 3 pm 117/65 HR 84. Pt is currently taking Lasix 40 mg daily, Plavix 75 mg daily, and Metoprolol 25 mg BID. Pt's last OV 10/28/20 RJ. Pt has f/u scheduled 2/3/21. [facility] please advise. Pt is concerned. His friend had same procedure done and was found dead the other day with similar symptoms. [facility] please advise. Pt can be reached at [phone].         TY

## 2021-01-07 NOTE — TELEPHONE ENCOUNTER
Pt states he has had high BP and SOB for the past month. BP is 193/119 this morning. Meds do not seem to help much. BP goes down a bit toward the end of the day but it is still high he states. Please advise.

## 2021-01-07 NOTE — TELEPHONE ENCOUNTER
Spoke with the patient. Increased lower extremity edema, increasing shortness of breath with exertion as well as fatigue. Blood pressures running higher in the morning. Better in the afternoon. Appears he is 13 pounds up with weight normal around 285 in November and he states weight is usually around there at home. He weighs 298 pounds today. Advised to take extra dose of Lasix today. We will plan on 2 doses tomorrow at 8 AM and 2 PM.  We will connect by phone tomorrow around 4-5 PM and discuss what to do over the weekend based on how he is feeling with these measures. May warrant evaluation in the ED versus fitting him in early next week to one my clinics.     GLORIA

## 2021-01-07 NOTE — TELEPHONE ENCOUNTER
Patient cancelled appointment on 1/13/21 with Dr. Daryl Sue for 31-90 day fu.    Reason: pt did not get CPAP. Patient does not want to start therapy on device. Patient did not reschedule appointment. Last OV 10/7/20  Assessment:       · Hypersomnia and observed sleep apnea  · COPD  · Chronic diastolic CHF   · CAD post bypass grafting surgery times 3 September 2020  · 45 pack year smoking- Quit march 2020          Plan:       · PSG evaluate for sleep related breathing disorder. Complications of OANH if not treated were discussed with patient patient, including systemic hypertension, pulmonary hypertension, cardiovascular morbidities, car accidents and all cause mortality.   · Continue albuterol 2 puffs Q4-6 hrs PRN  · Advised to get his Pneumococcal vaccine   · Advised to get influenza vaccine this year   · Advised to continue with smoking cessation

## 2021-01-08 NOTE — TELEPHONE ENCOUNTER
Spoke to patient again today. Weight is down to 293 from 298. States he lives around 5. His blood pressures are better in the 358C systolic. States he had trouble lying flat last night and sleeping his oxygen sats dropped so he put himself on oxygen. This is gotten better as the days gone on and in the upright position. Continuing to urinate well. Breathing is a little bit better but he still feels pretty short of breath with exertion. Does not want to come into the emergency department for evaluation today. So, we will continue twice daily Lasix p.o. dosing Saturday and Sunday and plan to speak again Monday. If his symptoms not significantly improved by that time I will recommend he go to the emergency department. If his symptoms worsen over the weekend he is told he will go to the emergency department. We will plan to get him added to my [facility] schedule Wednesday next week the 13th. Genaro/Sara, can we add this patient please?     GLORIA

## 2021-01-12 NOTE — PROGRESS NOTES
CARDIOLOGY FOLLOW-UP VISIT        Patient Name: Rock Bae  Primary Care physician: ANICETO Deluna CNP  Reason for Referral/Chief complaint: Congestive heart failure      Subjective:     Rock Bae is a pleasant 64 y.o. man with prior history notable for congestive heart failure with ischemic cardiomyopathy, coronary artery disease status post coronary bypass, previous smoker, and obstructive sleep apnea noncompliant with CPAP. I first evaluated the patient at [facility] 9/25/20 at which time he complained of chest pain. During admission he was found to have an abnormal stress. He then began having rest pain in the afternoon 9/22 consistent with unstable angina. He was inititated on acute coronary syndrome therapies and transferred to [facility] for catheterization which revealed multi-vessel coronary artery disease. Subsequently, he underwent three-vessel bypass with Dr. Lexie Steinberg, receiving a LIMA to LAD, SVG to OM and SVG to diagonal. Limited ECHO 9/29/20 confirmed Normal left ventricular systolic function with ejection fraction of 55-60%. He returns today for follow-up. In the interim I spoke with Anders Schuler by phone regarding increasing weight at home. He had gone from baseline  285 pounds to 298 pounds. Endorsed increasing lower extremity edema at that time as well. He had to go back on nocturnal oxygen due to his symptoms. We increased the frequency of his Lasix to twice daily for the past 4 days. Today, the patient states that his symptoms have improved somewhat but still persist.  Still continues having dyspnea with exertion as well as paroxysmal nocturnal dyspnea. His lower extremity edema is improved but still persists. He has abdominal bloating. He denies any chest pressure or heaviness with activity. His weight today is 293 pounds which he states is consistent with his home weight, still above his baseline.   He notes episodes of blurred vision and fluttering in his heart at times when his BP is running high. States his BPs are running up to 248W to 315 systolic at times when he checks it in the morning time. Denies dizziness, near-syncope or nancy syncope. 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 The patient is compliant with medications. Cost of medications is affordable. No endorsed side effects. Home Medications:  Were reviewed and are listed in nursing record and/or below  Prior to Admission medications    Medication Sig Start Date End Date Taking?  Authorizing Provider   clopidogrel (PLAVIX) 75 MG tablet Take 1 tablet by mouth daily 1/13/21  Yes Elena Fu MD   lisinopril (PRINIVIL;ZESTRIL) 10 MG tablet Take 1 tablet by mouth daily 1/13/21  Yes Elena Fu MD   furosemide (LASIX) 40 MG tablet Take 1 tablet by mouth daily 11/16/20 11/16/21 Yes Elena Fu MD   traZODone (DESYREL) 50 MG tablet Take 1 tablet by mouth nightly 10/30/20  Yes ANICETO Ring CNP   metoprolol tartrate (LOPRESSOR) 25 MG tablet Take 1 tablet by mouth 2 times daily Hold this medication for pulse <80 or systolic BP <866 34/87/41  Yes ANICETO Ring CNP   venlafaxine (EFFEXOR XR) 150 MG extended release capsule Take 1 capsule by mouth daily 10/30/20  Yes ANICETO Ring CNP   ibuprofen (ADVIL;MOTRIN) 800 MG tablet Take 1 tablet by mouth 3 times daily 10/30/20  Yes ANICETO Ring CNP   atorvastatin (LIPITOR) 80 MG tablet Take 1 tablet by mouth nightly 10/28/20  Yes Elena Fu MD   aspirin 81 MG EC tablet Take 1 tablet by mouth daily 9/30/20  Yes ANICETO Wells CNP   albuterol (PROVENTIL) (2.5 MG/3ML) 0.083% nebulizer solution Take 3 mLs by nebulization every 6 hours as needed for Wheezing or Shortness of Breath 9/25/19  Yes ANICETO Ring CNP   albuterol sulfate HFA (PROVENTIL HFA) 108 (90 Base) MCG/ACT inhaler Inhale 2 puffs into the lungs every 6 hours as needed for Wheezing 5/17/19  Yes Trini Andre MD   Reports he is not taking digoxin, potassium, furosemide or statin presently. He is taking Plavix in addition aspirin. CURRENT Medications:  No current facility-administered medications for this visit. Allergies:  Dilaudid [hydromorphone hcl] and Codeine     Review of Systems:   A 14 point review of symptoms completed. Pertinent positives identified in the HPI, all other review of symptoms negative. Objective:     Vitals:    01/13/21 1000   BP: (!) 134/90   Pulse:  90 bpm   Temp:    SpO2:  96%    Weight: 293 lb (132.9 kg)       PHYSICAL EXAM:    General:  Alert, cooperative, no distress, appears stated age   Head:  Normocephalic, atraumatic   Eyes:  Conjunctiva/corneas clear, anicteric sclerae    Nose: Nares normal, no drainage or sinus tenderness   Throat: No abnormalities of the lips, oral mucosa or tongue. Moist mucous membranes. Neck: Trachea midline. Neck supple with no lymphadenopathy, thyroid not enlarged, symmetric, no tenderness/mass/nodules, no Jugular venous pressure elevation    Lungs:   Clear to auscultation bilaterally, no wheezes, no rales, no respiratory distress   Chest Wall:   Well-healed sternotomy incision. Heart:  Regular rate and rhythm, normal S1, normal S2, no murmur, no rub, no S3/S4, PMI non-palpable. Abdomen:    Obese, soft, non-tender, with normoactive bowel sounds. No masses, no hepatosplenomegaly   Extremities: No cyanosis, clubbing. He has 1+ pitting at the ankles right greater than left. Incision for vein harvest right lower extremity well-healed. Vascular: 2+ radial, dorsalis pedis and posterior tibial pulses bilaterally. Brisk carotid upstrokes without carotid bruit. Skin: Skin color, texture, turgor are normal with no rashes or ulceration.     Pysch: Euthymic mood, appropriate affect   Neurologic: Oriented to person, place and time. No slurred speech or facial asymmetry. No motor or sensory deficits on gross examination. Labs:   CBC:   Lab Results   Component Value Date    WBC 11.2 10/19/2020    RBC 4.68 10/19/2020    HGB 13.7 10/19/2020    HCT 41.7 10/19/2020    MCV 89.1 10/19/2020    RDW 15.3 10/19/2020     10/19/2020     CMP:  Lab Results   Component Value Date     11/20/2020    K 4.3 11/20/2020    K 4.5 09/19/2020    CL 94 11/20/2020    CO2 33 11/20/2020    BUN 18 11/20/2020    CREATININE 0.8 11/20/2020    GFRAA >60 11/20/2020    GFRAA >60 03/16/2011    AGRATIO 1.3 09/19/2020    LABGLOM >60 11/20/2020    GLUCOSE 112 11/20/2020    PROT 6.5 09/25/2020    PROT 6.9 03/16/2011    CALCIUM 9.2 11/20/2020    BILITOT 1.0 09/25/2020    ALKPHOS 66 09/25/2020    AST 22 09/25/2020    ALT 55 09/25/2020     PT/INR:  No results found for: PTINR  HgBA1c:  Lab Results   Component Value Date    LABA1C 6.0 09/25/2020     Lab Results   Component Value Date    TROPONINI 0.01 10/19/2020     Fasting lipid profile from 9/25/2020 reviewed. HDL 34, LDL 16, triglycerides 173, total cholesterol 85. Interval Testing/Data:   EKG 10/19/20  Normal sinus rhythm Low voltage QRS Nonspecific T wave abnormality    Limited ECHO 9/29/20   Summary   Limited only f/u for LVEF post op CABG   Technically difficult examination. Normal left ventricular systolic function with ejection fraction of 55-60%. No regional wall motion abnormalites are seen. Compared to previous study from 8- no changes noted in left   ventricular function. Fan Raymond 9/22/20   Summary     Increase left ventricular systolic volume with hypokinesis/decreased     myocardial thickening of the inferolateral segment. Large area of inducible     ischemia of the LAD and left circumflex territories. Post-stress LVEF normal     at 56%. Overall findings represent highly abnormal study, high risk scan. [facility] 9/22/20  1.  Left main coronary artery has moderate calcific disease. It gave off the left anterior descending artery and left circumflex. 2. Left anterior descending artery has severe atherosclerotic disease. It was moderate in size. It gave off a large early diagonal branch that has ostial 90%. The LAD covered the entire apex of the left ventricle. 3. Left circumflex has severe atherosclerotic disease. It was moderate in size. There was a moderate sized obtuse marginal branch that is occluded. 4. Right coronary artery has mild atherosclerotic disease. It was large in size and was the dominant artery. ~there is faint collaterals to the LAD     5. Left ventriculogram showed normal LVEF at 55-60%. Wall motion was normal . There was no significant mitral valve or aortic valve disease noted. LVEDP was normal. There was no gradient noted across the aortic valve during pullback of the catheter. ECHO 8/19/20   Summary   LV systolic function is normal with EF estimated at 55-60%. Endocardium not   entirely well visualized but no obvious segmental wall motion abnormalities. There is moderate concentric left ventricular hypertrophy. Normal left ventricular diastolic filling pressure. Mild mitral annular calcification. Valves are not entirely well visualized but there is no obvious structural   abnormality or significant color flow abnormality noted on doppler. Unable to obtain SPAP due to lack of tricuspid regurgitation. Impression and Plan   Anayeli He is a 64 y.o. with prior history of congestive heart failure with preserved LV function with admission in August of 2020, obesity, prior 5 pack per day smoker for 15-20 years, chronic obstructive pulmonary disease on nocturnal oxygen, obstructive sleep apnea non-compliant with CPAP. He presented in September 2020 with unstable angina and was transferred to [facility] for cardiac catheterization which disclosed multivessel disease.   He subsequently underwent three-vessel bypass with Dr. Angel Lopez and has recovered well. 1.  Acute on chronic congestive heart failure with preserved ejection fraction  2. Coronary artery disease manifest with unstable angina status post bypass 9/2020  3. Tobacco abuse, now quit. 4.  Chronic obstructive pulmonary disease on nocturnal oxygen therapy  5. Obstructive sleep apnea noncompliant with CPAP  6. Obesity  7. Hypertension, uncontrolled    Patient Active Problem List   Diagnosis    Crush injury of right foot    Shortness of breath    Chest pain    Erectile dysfunction    Hyperglycemia    Anxiety    Depression    Tobacco abuse    History of alcohol abuse    Fatigue    OANH (obstructive sleep apnea)    Hypersomnia    Chronic obstructive pulmonary disease (HCC)    Lumbar radiculopathy    HNP (herniated nucleus pulposus), lumbar    Spondylosis without myelopathy or radiculopathy, lumbar region    DDD (degenerative disc disease), lumbar    Lumbar spine pain    Acute respiratory distress    Class 3 severe obesity with body mass index (BMI) of 45.0 to 49.9 in adult ([address]    Pneumonia, primary atypical    Acute respiratory failure with hypoxia ([address]    Moderate protein-calorie malnutrition (HCC)    Acute respiratory failure (HCC)    Acute on chronic respiratory failure with hypoxemia (HCC)    Acute diastolic congestive heart failure (HCC)    Hyperlipidemia    Insomnia    Morbid obesity with BMI of 45.0-49.9, adult (HCC)    Abnormal cardiovascular stress test    Coronary artery disease involving native coronary artery of native heart without angina pectoris    S/P three vessel coronary artery bypass       Plan:  1. Discussed will remain on dual antiplatelet therapy for at least 1 year given presentation of unstable angina. Will continue ASA 81mg life long. Medications renewed today.   2. Start lisinopril 10 mg daily and titrate as needed for goal blood pressure as it sounds like his blood pressures are running high in the mornings at home. Continue metoprolol at present dosing. Instructed to continue tracking his blood pressures at home. 3. Continue Lasix at 40 mg twice daily. Consider up titration should his symptoms and weight fail to improve over the next week or so. 4. Continue Lipitor daily at 40 mg nightly. 5.  Patient no longer participating in cardiac rehab. 6.  Check basic laboratories today including proBNP, basic metabolic profile and fasting lipids. 7.  If he continues to endorse episodes of fluttering in his chest after BP is controlled may consider Holter monitor to exclude atrial fibrillation      Return to clinic in 6 weeks. Consider repeat limited echocardiogram given acute on chronic congestive heart failure exacerbation. Suspect however that his uncontrolled blood pressure has caused exacerbation. I will address the patient's cardiac risk factors and adjusted pharmacologic treatment as needed. In addition, I have reinforced the need for patient directed risk factor modification. All questions and concerns were addressed to the patient/family. Alternatives to my treatment were discussed. Thank you for allowing us to participate in the care of Cynthia Chatterjee. Please call me with any questions [phone].     Elizabeth Muniz MD   Cardiovascular Disease  [address]  [phone] [facility]  [phone] [address] [place] [address]  1/13/2021 4:06 PM

## 2021-01-13 ENCOUNTER — OFFICE VISIT (OUTPATIENT)
Dept: CARDIOLOGY CLINIC | Age: 61
End: 2021-01-13
Payer: COMMERCIAL

## 2021-01-13 ENCOUNTER — HOSPITAL ENCOUNTER (OUTPATIENT)
Age: 61
Discharge: HOME | End: 2021-01-13
Payer: COMMERCIAL

## 2021-01-13 VITALS
DIASTOLIC BLOOD PRESSURE: 90 MMHG | HEART RATE: 90 BPM | TEMPERATURE: 96.9 F | SYSTOLIC BLOOD PRESSURE: 134 MMHG | OXYGEN SATURATION: 96 % | BODY MASS INDEX: 50.02 KG/M2 | HEIGHT: 64 IN | WEIGHT: 293 LBS

## 2021-01-13 DIAGNOSIS — I25.10 CORONARY ARTERY DISEASE INVOLVING NATIVE CORONARY ARTERY OF NATIVE HEART WITHOUT ANGINA PECTORIS: ICD-10-CM

## 2021-01-13 DIAGNOSIS — I50.31 ACUTE DIASTOLIC CONGESTIVE HEART FAILURE (HCC): ICD-10-CM

## 2021-01-13 DIAGNOSIS — E78.2 MIXED HYPERLIPIDEMIA: ICD-10-CM

## 2021-01-13 DIAGNOSIS — Z95.1 S/P THREE VESSEL CORONARY ARTERY BYPASS: ICD-10-CM

## 2021-01-13 DIAGNOSIS — I25.10 CORONARY ARTERY DISEASE INVOLVING NATIVE CORONARY ARTERY OF NATIVE HEART WITHOUT ANGINA PECTORIS: Primary | ICD-10-CM

## 2021-01-13 LAB
ANION GAP SERPL CALCULATED.3IONS-SCNC: 10 MMOL/L (ref 3–16)
BUN BLDV-MCNC: 17 MG/DL (ref 7–20)
CALCIUM SERPL-MCNC: 9.1 MG/DL (ref 8.3–10.6)
CHLORIDE BLD-SCNC: 103 MMOL/L (ref 99–110)
CHOLESTEROL, FASTING: 94 MG/DL (ref 0–199)
CO2: 28 MMOL/L (ref 21–32)
CREAT SERPL-MCNC: 0.9 MG/DL (ref 0.9–1.3)
GFR AFRICAN AMERICAN: >60
GFR NON-AFRICAN AMERICAN: >60
GLUCOSE BLD-MCNC: 104 MG/DL (ref 70–99)
HDLC SERPL-MCNC: 35 MG/DL (ref 40–60)
LDL CHOLESTEROL CALCULATED: 33 MG/DL
POTASSIUM SERPL-SCNC: 4.4 MMOL/L (ref 3.5–5.1)
PRO-BNP: 147 PG/ML (ref 0–124)
SODIUM BLD-SCNC: 141 MMOL/L (ref 136–145)
TRIGLYCERIDE, FASTING: 131 MG/DL (ref 0–150)
VLDLC SERPL CALC-MCNC: 26 MG/DL

## 2021-01-13 PROCEDURE — 36415 COLL VENOUS BLD VENIPUNCTURE: CPT

## 2021-01-13 PROCEDURE — 99214 OFFICE O/P EST MOD 30 MIN: CPT | Performed by: INTERNAL MEDICINE

## 2021-01-13 PROCEDURE — 83880 ASSAY OF NATRIURETIC PEPTIDE: CPT

## 2021-01-13 PROCEDURE — 80061 LIPID PANEL: CPT

## 2021-01-13 PROCEDURE — 80048 BASIC METABOLIC PNL TOTAL CA: CPT

## 2021-01-13 RX ORDER — LISINOPRIL 10 MG/1
10 TABLET ORAL DAILY
Qty: 60 TABLET | Refills: 5 | Status: SHIPPED | OUTPATIENT
Start: 2021-01-13 | End: 2021-03-03

## 2021-01-13 RX ORDER — FUROSEMIDE 40 MG/1
40 TABLET ORAL 2 TIMES DAILY
Qty: 180 TABLET | Refills: 3 | Status: SHIPPED | OUTPATIENT
Start: 2021-01-13 | End: 2021-03-03 | Stop reason: ALTCHOICE

## 2021-01-13 RX ORDER — CLOPIDOGREL BISULFATE 75 MG/1
75 TABLET ORAL DAILY
Qty: 90 TABLET | Refills: 3 | Status: SHIPPED | OUTPATIENT
Start: 2021-01-13 | End: 2022-02-18 | Stop reason: SDUPTHER

## 2021-01-13 NOTE — PATIENT INSTRUCTIONS
1.  Will add Lisinopril 10 mg 1 daily  2. Avoid added salt to diet and watch low sodium diet  3. Cardiac risk stratification education was reviewed including diet, exercise/activity, medication,and weight loss  4. Will check BMP, BNP, lipids today  5.   Follow up in 6 weeks

## 2021-01-18 ENCOUNTER — TELEPHONE (OUTPATIENT)
Dept: CARDIOLOGY CLINIC | Age: 61
End: 2021-01-18

## 2021-01-18 NOTE — TELEPHONE ENCOUNTER
----- Message from Annia Brittle, MD sent at 1/14/2021  4:44 PM EST -----  Please let the patient know labs look normal.  No change in medications at this point. Let me know if questions/concerns. Follow-up as planned.

## 2021-01-18 NOTE — TELEPHONE ENCOUNTER
Message relayed regarding labs. Patient states his BP is still running high. He states in the AM before he takes his med it is 180's to 200 over 117 to 118. He states he takes his med and then he checks it again in the evening. He states it runs 140's over 100. He is worried it is still high even with the new meds. Please advise.

## 2021-01-19 NOTE — TELEPHONE ENCOUNTER
Please call the patient let him know to double his lisinopril to 20 mg daily. Please advise him to check his blood pressures midmorning following early a.m. meds. If continues to run high we will further titrate lisinopril. We will consider addition of spironolactone. Due to see him again in 6 weeks status post last appointment. We will plan to repeat labs at that time. Please tell him I want update on BPs in 1 to 2 weeks if still running high.   Thank you    GLORIA

## 2021-01-22 ENCOUNTER — HOSPITAL ENCOUNTER (EMERGENCY)
Age: 61
Discharge: HOME | End: 2021-01-22
Attending: STUDENT IN AN ORGANIZED HEALTH CARE EDUCATION/TRAINING PROGRAM
Payer: COMMERCIAL

## 2021-01-22 ENCOUNTER — APPOINTMENT (OUTPATIENT)
Dept: GENERAL RADIOLOGY | Age: 61
End: 2021-01-22
Payer: COMMERCIAL

## 2021-01-22 VITALS
DIASTOLIC BLOOD PRESSURE: 85 MMHG | SYSTOLIC BLOOD PRESSURE: 157 MMHG | TEMPERATURE: 97.9 F | WEIGHT: 283 LBS | OXYGEN SATURATION: 94 % | RESPIRATION RATE: 20 BRPM | BODY MASS INDEX: 48.32 KG/M2 | HEIGHT: 64 IN | HEART RATE: 95 BPM

## 2021-01-22 DIAGNOSIS — Z23 NEED FOR VACCINE FOR DT (DIPHTHERIA-TETANUS): ICD-10-CM

## 2021-01-22 DIAGNOSIS — S61.212A LACERATION OF RIGHT MIDDLE FINGER WITHOUT FOREIGN BODY WITHOUT DAMAGE TO NAIL, INITIAL ENCOUNTER: Primary | ICD-10-CM

## 2021-01-22 PROCEDURE — 90471 IMMUNIZATION ADMIN: CPT | Performed by: STUDENT IN AN ORGANIZED HEALTH CARE EDUCATION/TRAINING PROGRAM

## 2021-01-22 PROCEDURE — 12001 RPR S/N/AX/GEN/TRNK 2.5CM/<: CPT

## 2021-01-22 PROCEDURE — 6360000002 HC RX W HCPCS: Performed by: STUDENT IN AN ORGANIZED HEALTH CARE EDUCATION/TRAINING PROGRAM

## 2021-01-22 PROCEDURE — 6370000000 HC RX 637 (ALT 250 FOR IP): Performed by: STUDENT IN AN ORGANIZED HEALTH CARE EDUCATION/TRAINING PROGRAM

## 2021-01-22 PROCEDURE — 90715 TDAP VACCINE 7 YRS/> IM: CPT | Performed by: STUDENT IN AN ORGANIZED HEALTH CARE EDUCATION/TRAINING PROGRAM

## 2021-01-22 PROCEDURE — 73130 X-RAY EXAM OF HAND: CPT

## 2021-01-22 PROCEDURE — 99283 EMERGENCY DEPT VISIT LOW MDM: CPT

## 2021-01-22 RX ORDER — ACETAMINOPHEN 500 MG
1000 TABLET ORAL 3 TIMES DAILY
Qty: 60 TABLET | Refills: 0 | Status: ON HOLD | OUTPATIENT
Start: 2021-01-22 | End: 2021-04-20 | Stop reason: HOSPADM

## 2021-01-22 RX ORDER — CEPHALEXIN 250 MG/1
250 CAPSULE ORAL ONCE
Status: COMPLETED | OUTPATIENT
Start: 2021-01-22 | End: 2021-01-22

## 2021-01-22 RX ORDER — CEPHALEXIN 250 MG/1
250 CAPSULE ORAL 4 TIMES DAILY
Qty: 28 CAPSULE | Refills: 0 | Status: SHIPPED | OUTPATIENT
Start: 2021-01-22 | End: 2021-01-29

## 2021-01-22 RX ADMIN — TETANUS TOXOID, REDUCED DIPHTHERIA TOXOID AND ACELLULAR PERTUSSIS VACCINE, ADSORBED 0.5 ML: 5; 2.5; 8; 8; 2.5 SUSPENSION INTRAMUSCULAR at 16:08

## 2021-01-22 RX ADMIN — CEPHALEXIN 250 MG: 250 CAPSULE ORAL at 17:31

## 2021-01-22 ASSESSMENT — PAIN DESCRIPTION - LOCATION: LOCATION: FINGER (COMMENT WHICH ONE)

## 2021-01-22 NOTE — ED PROVIDER NOTES
[facility] ED      CHIEF COMPLAINT  Laceration (laceration right middle finger. was working on truck and block slipped, tdap 2 months ago. )       [address] [facility]  Willow Arredondo is a 64 y.o. male with a past medical history of COPD, coronary artery disease, degenerative disc disease, kidney stone, who presents to the ED complaining of laceration. Willow Arredondo presents with a right hand third digit laceration, that occurred 1 hour prior to arrival, while crushing his fingers in the bed of a pickup truck. The pain is severe, throbbing, worse with nothing, improves with nothing, and does not radiate. He has pain in the second and third digit. Other injuries, abrasions and lacerations are Absent. upper extremity edema, coolness, and weakness are Absent. Numbness and tingling are Absent. Fever and chills are absent. Last tetanus in 2015. Patient is on Plavix, no other blood thinners. No other complaints, modifying factors or associated symptoms. I have reviewed the following from the nursing documentation.     Past Medical History:   Diagnosis Date    Anxiety 1/6/2020    Aortic valve calcification 09/2020    seen on lung CT    Chronic obstructive pulmonary disease ([facility] [address] 9/3/2019    PFT done 9/03/19    Coronary artery calcification 09/2020    seen on lung ct    Crush injury of right foot 7/23/2015    DDD (degenerative disc disease), lumbar 1/6/2020    Erectile dysfunction 1/6/2020    Fatigue 1/6/2020    History of alcohol abuse 1/6/2020    History of drug use     HNP (herniated nucleus pulposus), lumbar 1/6/2020    Hyperglycemia 1/6/2020    Hypersomnia 1/6/2020    Kidney stone     Lumbar radiculopathy 1/6/2020    Lumbar spine pain 1/6/2020    LVH (left ventricular hypertrophy)     seen on echo 8/2020, moderate    Observed sleep apnea 1/6/2020    Reactive depression 1/6/2020    Spondylosis without myelopathy or radiculopathy, lumbar region 1/6/2020    Tobacco use 2020     Past Surgical History:   Procedure Laterality Date    CHOLECYSTECTOMY      CORONARY ARTERY BYPASS GRAFT N/A 2020    CORONARY ARTERY BYPASS GRAFTING X3, INTERNAL MAMMARY ARTERY, SAPHENOUS VEIN GRAFT, ON PUMP, STERNAL PLATING, 5 LEVEL BILATERAL INTERCOSTAL NERVE BLOCK, PLATELET GEL APPLICATION performed by Ashley Montoya MD at [address]  2011    cystoscopy left ureteroscopy, left retrograde, double J stent placement    PAIN MANAGEMENT PROCEDURE Right 2019    RIGHT LUMBAR FIVE SACRAL ONE TRANSFORAMINAL EPIDURAL STEROID INJECTION SITE CONFIRMED BY FLUOROSCOPY performed by Jayden Wills MD at [facility] Right 2020    RIGHT LUMBAR FOUR AND LUMBAR FIVE TRANSFORAMINAL EPIDURAL STEROID INJECTION SITE CONFIRMED BY FLUOROSCOPY performed by Jayden Wills MD at [place] [address]     Family History   Problem Relation Age of Onset    Heart Disease Mother     Other Mother         copd    Liver Disease Father     High Blood Pressure Sister     No Known Problems Sister      Social History     Socioeconomic History    Marital status:      Spouse name: Not on file    Number of children: Not on file    Years of education: Not on file    Highest education level: Not on file   Occupational History    Not on file   Social Needs    Financial resource strain: Not on file    Food insecurity     Worry: Not on file     Inability: Not on file    Transportation needs     Medical: Not on file     Non-medical: Not on file   Tobacco Use    Smoking status: Former Smoker     Packs/day: 2.00     Years: 35.00     Pack years: 70.00     Types: Cigarettes     Quit date: 3/1/2020     Years since quittin.8    Smokeless tobacco: Never Used   Substance and Sexual Activity    Alcohol use: No    Drug use: Not Currently     Comment: hx of drug use    Sexual activity: Not on file   Lifestyle    Physical activity     Days per week: Not on file     Minutes per session: Not on file    Stress: Not on file   Relationships    Social connections     Talks on phone: Not on file     Gets together: Not on file     Attends Roman Catholic service: Not on file     Active member of club or organization: Not on file     Attends meetings of clubs or organizations: Not on file     Relationship status: Not on file    Intimate partner violence     Fear of current or ex partner: Not on file     Emotionally abused: Not on file     Physically abused: Not on file     Forced sexual activity: Not on file   Other Topics Concern    Not on file   Social History Narrative    Not on file     Current Facility-Administered Medications   Medication Dose Route Frequency Provider Last Rate Last Admin    Tetanus-Diphth-Acell Pertussis (BOOSTRIX) injection 0.5 mL  0.5 mL Intramuscular Once Sheila Jalloh MD         Current Outpatient Medications   Medication Sig Dispense Refill    clopidogrel (PLAVIX) 75 MG tablet Take 1 tablet by mouth daily 90 tablet 3    lisinopril (PRINIVIL;ZESTRIL) 10 MG tablet Take 1 tablet by mouth daily 60 tablet 5    furosemide (LASIX) 40 MG tablet Take 1 tablet by mouth 2 times daily 180 tablet 3    traZODone (DESYREL) 50 MG tablet Take 1 tablet by mouth nightly 30 tablet 1    metoprolol tartrate (LOPRESSOR) 25 MG tablet Take 1 tablet by mouth 2 times daily Hold this medication for pulse <37 or systolic BP <853 90 tablet 3    venlafaxine (EFFEXOR XR) 150 MG extended release capsule Take 1 capsule by mouth daily 30 capsule 5    ibuprofen (ADVIL;MOTRIN) 800 MG tablet Take 1 tablet by mouth 3 times daily 90 tablet 1    atorvastatin (LIPITOR) 80 MG tablet Take 1 tablet by mouth nightly 90 tablet 3    aspirin 81 MG EC tablet Take 1 tablet by mouth daily 30 tablet 0    albuterol (PROVENTIL) (2.5 MG/3ML) 0.083% nebulizer solution Take 3 mLs by nebulization every 6 hours as needed for Wheezing or Shortness of Breath 25 vial 1    albuterol sulfate HFA (PROVENTIL HFA) 108 (90 Base) MCG/ACT inhaler Inhale 2 puffs into the lungs every 6 hours as needed for Wheezing 1 Inhaler 0     Allergies   Allergen Reactions    Dilaudid [Hydromorphone Hcl] Nausea And Vomiting    Codeine Itching       REVIEW OF SYSTEMS  10 systems reviewed, pertinent positives per HPI otherwise noted to be negative. PHYSICAL EXAM  BP (!) 157/85   Pulse 103   Temp 97.9 °F (36.6 °C) (Temporal)   Resp 20   Ht 5' 4\" (1.626 m)   Wt 283 lb (128.4 kg)   SpO2 94%   BMI 48.58 kg/m²    GENERAL APPEARANCE: Awake and alert. Cooperative. no distress. HENT: Normocephalic. Atraumatic. Mucous membranes are moist  NECK: Supple. Full range of motion of the neck without stiffness or pain. EYES: PERRL. EOM's grossly intact. HEART/CHEST: RRR. No murmurs. Chest wall is not tender to palpation. LUNGS: Respirations unlabored. CTAB. Good air exchange. Speaking comfortably in full sentences. ABDOMEN: No tenderness. Soft. Non-distended. No masses. No organomegaly. No guarding or rebound. MUSCULOSKELETAL: No extremity edema. Compartments soft. Soft tissue deformity. Tenderness to right hand middle finger. All extremities neurovascularly intact. SKIN: Warm and dry. No acute rashes. The finger laceration is located on the tip of the middle digit, currently hemostatic.  moderate tenderness to palpation. Surrounding erythema is absent. Drainage is absent. Cyanosis present to the skin flap, no edema, pallor, petechiae, rashes, and ecchymoses are present. Skin temperature is normal.  Foreign bodies are absent. NEUROLOGICAL: Alert and oriented. No gross facial drooping. Strength 5/5, sensation intact. PSYCHIATRIC: Normal mood and affect. LABS  I have reviewed all labs for this visit. No results found for this visit on 01/22/21. RADIOLOGY    XR HAND RIGHT (MIN 3 VIEWS)   Final Result   No acute osseous abnormality of the right hand.   Soft tissue injury at the   tip of the 3rd finger with no gas or foreign body                ED COURSE / MDM  Patient seen and evaluated. Old records reviewed. Labs and imaging reviewed and results discussed with patient. Overall well appearing patient, in no acute distress, presenting for laceration. Physical exam remarkable for circumferential laceration on the tip of the right middle finger. Differential diagnosis includes but is not limited to: Laceration, fracture, tuft fracture, open fracture, near amputation, foreign body    During the patient's ED course, the patient was given:  Medications   Tetanus-Diphth-Acell Pertussis (BOOSTRIX) injection 0.5 mL (0.5 mLs Intramuscular Given 1/22/21 1608)   cephALEXin (KEFLEX) capsule 250 mg (250 mg Oral Given 1/22/21 1731)      Last tetanus was in 2015. Tetanus updated. X-ray did not show evidence of a fracture. Do not suspect tuft fracture or open fracture. Patient has a laceration/almost amputation of the soft tissue of the tip of the right middle finger. Patient states that he was able to see the bone underneath. At this time, will plan to try to repair the laceration. Patient counseled that this skin may be devitalized and may not have a blood supply anymore and may not survive. However, the skin will help protect the underlying tissue. At this time, there is no evidence of nailbed injury. PROCEDURE DIGITAL BLOCK  Patient offered a digital block for pain control. Questions sought and answered, verbal consent obtained. Digital block of affected digit performed with 3cc 1% Lidocaine without epinephrine. Vola Pipes tolerated the procedure well, no acute complications. Adequate anesthesia achieved. Once appropriate anesthesia achieved with digital block, the wound was copiously cleaned and explored. PROCEDURE LACERATION REPAIR  Vola Pipes or their surrogate had an opportunity to ask questions, and the risks, benefits, and alternatives were discussed.  The wound was prepped and draped to maintain a sterile field. The wound is a 2 cm circumferential wound. A local anesthetic was used to completely anesthetize the wound. It was copiously irrigated. It was explored to its depth in a bloodless field with no sign of tendon, nerve, or vascular injury. No foreign bodies were identified. It was closed with 9 sutures. There were no complications during the procedure. Given the mechanism of injury, will treat with antibiotics. At this time, feel the patient is appropriate for discharge to follow-up with hand clinic. Patient feels comfortable with discharge at this time. Patient was provided with prescriptions for Keflex. Return precautions given. Encouraged PCP follow-up in 3 days and hand clinic follow-up as soon as possible. Patient discharged in stable condition. I estimate there is LOW risk for COMPARTMENT SYNDROME, TENDON OR NEUROVASCULAR INJURY, OR FOREIGN BODY, thus I consider the discharge disposition reasonable. Also, there is no evidence or peritonitis, sepsis, or toxicity. Brad Mercado and I have discussed the diagnosis and risks, and we agree with discharging home to follow-up with their primary doctor. We also discussed returning to the Emergency Department immediately if new or worsening symptoms occur. We have discussed the symptoms which are most concerning (e.g., changing or worsening pain, fever, numbness, weakness, cool or painful digits) that necessitate immediate return. CLINICAL IMPRESSION  1. Laceration of right middle finger without foreign body without damage to nail, initial encounter    2. Need for vaccine for DT (diphtheria-tetanus)        Blood pressure (!) 157/85, pulse 95, temperature 97.9 °F (36.6 °C), temperature source Temporal, resp. rate 20, height 5' 4\" (1.626 m), weight 283 lb (128.4 kg), SpO2 94 %. DISPOSITION  Brad Mercado was discharged to home in stable condition.       Patient was given scripts for the following medications. I counseled patient how to take these medications. Discharge Medication List as of 1/22/2021  5:32 PM      START taking these medications    Details   acetaminophen (TYLENOL) 500 MG tablet Take 2 tablets by mouth 3 times daily for 10 days, Disp-60 tablet, R-0Normal      cephALEXin (KEFLEX) 250 MG capsule Take 1 capsule by mouth 4 times daily for 7 days, Disp-28 capsule, R-0Normal             Follow-up with:  [address] Ute Hammonds MD  [address] [facility] [address]  Ph: [phone]  Schedule an appointment as soon as possible for a visit       Adria Viveros, APRN - CNP  [address] [facility]  [phone]    Schedule an appointment as soon as possible for a visit on 1/25/2021  For wound re-check    [facility] ([facility] [place] [facility]  [address] [facility]  Go to   As needed, If symptoms worsen      DISCLAIMER: This chart was created using Dragon dictation software. Efforts were made by me to ensure accuracy, however some errors may be present due to limitations of this technology and occasionally words are not transcribed correctly.       Kendall Rocha MD  01/22/21 8035

## 2021-01-27 ENCOUNTER — OFFICE VISIT (OUTPATIENT)
Dept: ORTHOPEDIC SURGERY | Age: 61
End: 2021-01-27
Payer: COMMERCIAL

## 2021-01-27 VITALS — WEIGHT: 283 LBS | BODY MASS INDEX: 48.32 KG/M2 | HEIGHT: 64 IN

## 2021-01-27 DIAGNOSIS — S61.209A AVULSION OF FINGER TIP, INITIAL ENCOUNTER: Primary | ICD-10-CM

## 2021-01-27 PROCEDURE — 99203 OFFICE O/P NEW LOW 30 MIN: CPT | Performed by: ORTHOPAEDIC SURGERY

## 2021-01-27 RX ORDER — OXYCODONE HYDROCHLORIDE AND ACETAMINOPHEN 5; 325 MG/1; MG/1
1 TABLET ORAL EVERY 6 HOURS PRN
Qty: 28 TABLET | Refills: 0 | Status: SHIPPED | OUTPATIENT
Start: 2021-01-27 | End: 2021-02-03

## 2021-01-27 NOTE — PROGRESS NOTES
[facility]  Outpatient Progress Note  Cat Queen MD    Patient Name: Gino Blancas MRN: [id number]   Age: 64 y.o. YOB: 1960   Sex: male      [address] Complaint   Patient presents with    Injury     RIGHT LONG FINGER - FINGER TIP AMPUTATION 1/24/21 - GOT FINGER STUCK IN TAILGATE        HISTORY OF PRESENT ILLNESS   Gino Blancas is a 64 y.o. male presents to the office today for orthopedic consultation after he smashed his right long finger in a tailgate of a dump truck on January 24, 2021. He was seen in the emergency department. He was found to have a soft tissue avulsion of the tip of the long finger. His open wound was cleaned and reapproximated with suture. He is having a significant amount of discomfort in the finger.     PAST MEDICAL HISTORY      Past Medical History:   Diagnosis Date    Anxiety 1/6/2020    Aortic valve calcification 09/2020    seen on lung CT    Chronic obstructive pulmonary disease ([facility] [address] 9/3/2019    PFT done 9/03/19    Coronary artery calcification 09/2020    seen on lung ct    Crush injury of right foot 7/23/2015    DDD (degenerative disc disease), lumbar 1/6/2020    Erectile dysfunction 1/6/2020    Fatigue 1/6/2020    History of alcohol abuse 1/6/2020    History of drug use     HNP (herniated nucleus pulposus), lumbar 1/6/2020    Hyperglycemia 1/6/2020    Hypersomnia 1/6/2020    Kidney stone     Lumbar radiculopathy 1/6/2020    Lumbar spine pain 1/6/2020    LVH (left ventricular hypertrophy)     seen on echo 8/2020, moderate    Observed sleep apnea 1/6/2020    Reactive depression 1/6/2020    Spondylosis without myelopathy or radiculopathy, lumbar region 1/6/2020    Tobacco use 1/6/2020       PAST SURGICAL HISTORY     Past Surgical History:   Procedure Laterality Date    CHOLECYSTECTOMY      CORONARY ARTERY BYPASS GRAFT N/A 9/25/2020    CORONARY ARTERY BYPASS GRAFTING X3, INTERNAL MAMMARY ARTERY, aspirin 81 MG EC tablet Take 1 tablet by mouth daily 30 tablet 0    albuterol (PROVENTIL) (2.5 MG/3ML) 0.083% nebulizer solution Take 3 mLs by nebulization every 6 hours as needed for Wheezing or Shortness of Breath 25 vial 1    albuterol sulfate HFA (PROVENTIL HFA) 108 (90 Base) MCG/ACT inhaler Inhale 2 puffs into the lungs every 6 hours as needed for Wheezing 1 Inhaler 0     No current facility-administered medications for this visit.         ALLERGIES     Allergies   Allergen Reactions    Dilaudid [Hydromorphone Hcl] Nausea And Vomiting    Codeine Itching       FAMILY HISTORY     Family History   Problem Relation Age of Onset    Heart Disease Mother     Other Mother         copd    Liver Disease Father     High Blood Pressure Sister     No Known Problems Sister        SOCIAL HISTORY     Social History     Socioeconomic History    Marital status:      Spouse name: None    Number of children: None    Years of education: None    Highest education level: None   Occupational History    None   Social Needs    Financial resource strain: None    Food insecurity     Worry: None     Inability: None    Transportation needs     Medical: None     Non-medical: None   Tobacco Use    Smoking status: Former Smoker     Packs/day: 2.00     Years: 35.00     Pack years: 70.00     Types: Cigarettes     Quit date: 3/1/2020     Years since quittin.9    Smokeless tobacco: Never Used   Substance and Sexual Activity    Alcohol use: No    Drug use: Not Currently     Comment: hx of drug use    Sexual activity: None   Lifestyle    Physical activity     Days per week: None     Minutes per session: None    Stress: None   Relationships    Social connections     Talks on phone: None     Gets together: None     Attends Restorationism service: None     Active member of club or organization: None     Attends meetings of clubs or organizations: None     Relationship status: None    Intimate partner violence     Fear of current or ex partner: None     Emotionally abused: None     Physically abused: None     Forced sexual activity: None   Other Topics Concern    None   Social History Narrative    None       REVIEW OF SYSTEMS   General: no fever, chills, night sweats, anorexia, malaise, fatigue, or weight change  Hematologic:  no unexplained bleeding or bruising  HEENT:   no nasal congestion, rhinorrhea, sore throat, or facial pain  Respiratory:  no cough, dyspnea, or chest pain  Cardiovascular:  no angina, LEE, PND, orthopnea, dependent edema, or palpitations  Gastrointestinal:  no nausea, vomiting, diarrhea, constipation, or abdominal pain  Genitourinary:  no urinary urgency, frequency, dysuria, or hematuria  Musculoskeletal: see HPI  Endocrine:  no heat or cold intolerance and no polyphagia, polydipsia, or polyuria  Skin:  no skin eruptions or changing lesions  Neurologic:  no focal weakness, numbness/tingling, tremor, or severe headache. See HPI. See HPI for pertinent positives. PHYSICAL EXAM   Vital Signs:   Vitals:    01/27/21 1026   Weight: 283 lb (128.4 kg)   Height: 5' 4\" (1.626 m)       General appearance: healthy, alert, no distress  Skin: Skin color, texture, turgor normal. No rashes or lesions  HEENT: atraumatic, normocephalic. PERRL  Respiratory: Unlabored breathing  Lymphatic: No adenopathy   Neuro: Alert and oriented, normal distal sensation, normal bilateral DTRs  Vascular: Normal distal capillary and distal pulses  Muskuloskeletal Exam: Right long finger examination shows a near circumferential soft tissue avulsion of the volar aspect of the fingertip which is approximated with nonabsorbable suture material.  The soft tissue flap appears nonviable. RADIOLOGY   X-rays obtained and reviewed in office:  Views right hand 3 views    Impression: No bony abnormality    IMPRESSION     1.  Avulsion of finger tip, initial encounter         PLAN   I had a lengthy discussion with patient today regarding diagnosis and treatment options and recommendations. I suspect the patient will experience full thickness skin slough from the soft tissue avulsion at the tip of the middle finger of his right hand. There is no sign of infection currently. We will redress the wound and place him in a splint for protection but then I would recommend he be referred to hand surgery for definitive management of the injury. FOLLOWUP     Return for consultation with Dr. Serafin Bonilla surgery. Orders Placed This Encounter   Procedures   Max Berumen MD, Orthopedic Surgery, [facility]     Referral Priority:   Routine     Referral Type:   Eval and Treat     Referral Reason:   Specialty Services Required     Referred to Provider:   Brian Hinojosa MD     Requested Specialty:   Hand Surgery     Number of Visits Requested:   1      Orders Placed This Encounter   Medications    oxyCODONE-acetaminophen (PERCOCET) 5-325 MG per tablet     Sig: Take 1 tablet by mouth every 6 hours as needed for Pain for up to 7 days. Intended supply: 7 days.  Take lowest dose possible to manage pain     Dispense:  28 tablet     Refill:  0     Reduce doses taken as pain becomes manageable       Patient was instructed on appropriate use of braces, participation in home exercise programs, healthy lifestyle choices and weight loss as appropriate     Yesenia Morales MD

## 2021-01-29 ENCOUNTER — TELEPHONE (OUTPATIENT)
Dept: CARDIOLOGY CLINIC | Age: 61
End: 2021-01-29

## 2021-01-29 ENCOUNTER — TELEPHONE (OUTPATIENT)
Dept: ORTHOPEDIC SURGERY | Age: 61
End: 2021-01-29

## 2021-01-29 ENCOUNTER — OFFICE VISIT (OUTPATIENT)
Dept: ORTHOPEDIC SURGERY | Age: 61
End: 2021-01-29
Payer: COMMERCIAL

## 2021-01-29 VITALS — BODY MASS INDEX: 48.32 KG/M2 | TEMPERATURE: 98.1 F | RESPIRATION RATE: 16 BRPM | WEIGHT: 283 LBS | HEIGHT: 64 IN

## 2021-01-29 DIAGNOSIS — S68.119A AMPUTATION OF TIP OF FINGER, INITIAL ENCOUNTER: Primary | ICD-10-CM

## 2021-01-29 PROCEDURE — 99243 OFF/OP CNSLTJ NEW/EST LOW 30: CPT | Performed by: ORTHOPAEDIC SURGERY

## 2021-01-29 NOTE — LETTER
[address] [facility] - HAND SURGERY  Surgery Scheduling Form:      DEMOGRAPHICS:                                                                                                              .    Patient Name:  Marc Giron  Patient :  1960   Patient SS#:  [id number]    Patient Phone:  [phone] (home)     Patient Address:  [address] [address] [place] [address]    PCP:  ANICETO Brownlee CNP  Insurance:    Payor/Plan Subscr  Sex Relation Sub. Ins. ID Effective Group Num   1. [facility] 1960 Male Self [id number]                                    [address] [facility] [address]     DIAGNOSIS & PROCEDURE:                                                                                            .  Diagnosis:   right Middle Finger Traumatic Amputation    S68.629A  Operation:  right Middle Finger Irrigation and Debridement with Revision Amputation and Bone Shortening with possible Nail Ablation    [id number]   Location: [facility]  Surgeon:  Liseth Harris    SCHEDULING INFORMATION:                                                                                         .    Surgeon's Scheduling Instruction:  7-10 day    RN Post-op Appt:  [] Yes   [x] No  Preferred Thursday:   [] Yes   [] No    Requested Date:    OR Time:  2:00 Patient Arrival Time:  12:30     OR Time Required:  25  Minutes  Anesthesia:  General  Equipment:  None                                 COVID    Mini C-Arm:  YES   Standard C-Arm:  No  Status:  outpatient  PAT Required:  Yes  Comments:                      Alona Peoples. Odette Aase, MD 21, 1:56 PM      BILLING INFORMATION:                                                                                                    .    Procedure:       CPT Code Modifier  right Middle Finger Irrigation & Debridement with Revision Amputation and Bone Shortening with possible Nail Ablation      .  [id number]        Pre Operative Physician Prophylaxis Orders - SCIP Protocols      Pre-Operative Antibiotic Order:    Allergies   Allergen Reactions    Dilaudid [Hydromorphone Hcl] Nausea And Vomiting    Codeine Itching          []  ----  No Antibiotic Ordered       [x]  ----  Give the following Antibiotic within 1 hour prior to start time:         Ancef 1 gram IV if patient is less than 200 pounds    or       Ancef 2 grams IV if patient is greater than 200 pounds    or      Vancomycin 1 gram IV (over 1 hour) if patient is allergic to           PENICILLINS or CEFALOSPORINS        SURG    2-2                              COVID   1-29                                 H&P    USE ER REPORT ATTACHED    Procedure: right Middle Finger Irrigation and Debridement with Revision Amputation and Bone Shortening with possible Nail Ablation     Patient: Anayeli He  :    1960    Physician Signature:     Date: 21  Time: 1:56 PM                                               [address], [address] [place] [address], [address] [place] [address]  [phone] (phone)  [phone] (Fax)      Cardiac Clearance Request Form       DR. TANYA ROSE         Our mutual patient  Anayeli He   1960      Is scheduled for a procedure:    right Middle Finger Irrigation and Debridement with Revision Amputation and Bone Shortening with possible Nail Ablation    [address]            Date:   21         Time:                         Dr. Payton Gipson XX____    They will be receiving    GENERAL   XX                  MAC/TIVA                  SPINAL  for sedation.       Office :  Germania Maxwell  -                  Phone Number:  [phone]      We are requesting:      Cardiac Clearance with Anticoagulation Recommendations discontinue 7 days prior to procedure      Please fax this sheet to [phone]                   ATTN: Germania Maxwell

## 2021-01-29 NOTE — TELEPHONE ENCOUNTER
CPT: 97734, 80365  BODY PART: right hand   AUTHORIZATION: [id number]    Per [address] [facility] @ [phone] [address]   1/29/21

## 2021-01-29 NOTE — LETTER
[address] Cardiology - [address] [facility]  Phone: [phone]  Fax: [phone]    Guillermina Montoya MD        January 29, 2021     [facility] - BE[facility]AVIORAL [facility]  [address] [place] [address] [facility]      To Whom it May Concern:    Patient may proceed with non cardiac surgery. Prefer patient to remain on Asprin. Recommend if patient is to be off both Asprin and Plavix that he restart medications as soon as possible. If you have any questions or concerns, please don't hesitate to call.     Sincerely,        Guillermina Montoya MD

## 2021-01-29 NOTE — TELEPHONE ENCOUNTER
Spoke with patient. States he is having the rest of his finger amputated and will be able to restart the medication the next day. Letter typed and faxed.

## 2021-01-29 NOTE — Clinical Note
Dear  Leticia Ellis MD,    Thank you very much for your referral or . Nadeen Loco to me for evaluation and treatment of his Hand & Wrist condition. I appreciate your confidence in me and thank you for allowing me the opportunity to care for your patients. If I can be of any further assistance to you on this or any other patient, please do not hesitate to contact me. Sincerely,    Uriel Garcia MD

## 2021-01-29 NOTE — TELEPHONE ENCOUNTER
Ok to hold plavix if surgery is urgent. Would recommend continuing aspirin throughout the salvador-operative period if able. May proceed and restart both aspirin/plavix as soon as possible post-procedure if both are to be held.      GLORIA

## 2021-01-29 NOTE — PROGRESS NOTES
Maddi Height    Age 64 y.o.    male    1960    MRN [id number]    2/2/2021  Arrival Time_____________  OR Time____________[address]     Procedure(s):  RIGHT MIDDLE FINGER IRRIGATION AND DEBRIDEMENT WITH REVISION AMPUTATION AND BONE SHORTENING, POSSIBLE NAIL ABLATION                      General    Surgeon(s):  Jennell Paget, MD       Phone [phone] ([place])     [facility]  Cell Work  _________________________________________________________________  _________________________________________________________________  _________________________________________________________________  _________________________________________________________________  _________________________________________________________________      PCP _____________________________ Phone_________________       H&P__________________Bringing    Chart            Epic  DOS     Called_______  EKG__________________Bringing    Chart            Epic  DOS     Called_______  LAB__________________ Bringing    Chart            Epic  DOS     Called_______  Cardiac Clearance_______Bringing    Chart            Epic      DOS       Called_______    Cardiologist________________________ Phone___________________________      ? Jewish concerns / Waiver on Chart            PAT Communications_____________  ? Pre-op Instructions Given [place]          ______________________________  ? Directions to Surgery Center                          ______________________________  ? Transportation Home_______________      _______________________________  ?  Crutches/Walker__________________        _______________________________      ________Pre-op Orders   _______Transcribed    _______Wt.  ________Pharmacy          _______SCD  ______VTE     ______Beta Blocker  ________Consent             ________TED Melissa Flicker

## 2021-01-29 NOTE — TELEPHONE ENCOUNTER
Jesus Willson, 1960    Cardiac Risk Assessment    What type of procedure are you having?:  Amputation of top right middle finger    When is your procedure scheduled for?:  2.2.21    What physician is performing your procedure?:  Dr. Amanda Andre    PT NEEDS TO COME OFF ASA AND PLAVIX PRIOR TO PROCEDURE IS THIS OK? Phone Number:  [phone]    Fax number to send the letter:  [phone]. Last ov 1.13.21    Impression and Plan   Jesus Willson is a 64 y.o. with prior history of congestive heart failure with preserved LV function with admission in August of 2020, obesity, prior 5 pack per day smoker for 15-20 years, chronic obstructive pulmonary disease on nocturnal oxygen, obstructive sleep apnea non-compliant with CPAP. He presented in September 2020 with unstable angina and was transferred to [facility] for cardiac catheterization which disclosed multivessel disease. He subsequently underwent three-vessel bypass with Dr. Nestor Christianson and has recovered well.     1. Acute on chronic congestive heart failure with preserved ejection fraction  2. Coronary artery disease manifest with unstable angina status post bypass 9/2020  3. Tobacco abuse, now quit. 4.  Chronic obstructive pulmonary disease on nocturnal oxygen therapy  5. Obstructive sleep apnea noncompliant with CPAP  6. Obesity  7.   Hypertension, uncontrolled         Patient Active Problem List   Diagnosis    Crush injury of right foot    Shortness of breath    Chest pain    Erectile dysfunction    Hyperglycemia    Anxiety    Depression    Tobacco abuse    History of alcohol abuse    Fatigue    OANH (obstructive sleep apnea)    Hypersomnia    Chronic obstructive pulmonary disease (HCC)    Lumbar radiculopathy    HNP (herniated nucleus pulposus), lumbar    Spondylosis without myelopathy or radiculopathy, lumbar region    DDD (degenerative disc disease), lumbar    Lumbar spine pain    Acute respiratory distress    Class 3 severe obesity with body mass index (BMI) of 45.0 to 49.9 in adult [facility])    Pneumonia, primary atypical    Acute respiratory failure with hypoxia (HCC)    Moderate protein-calorie malnutrition (HCC)    Acute respiratory failure (HCC)    Acute on chronic respiratory failure with hypoxemia (HCC)    Acute diastolic congestive heart failure (HCC)    Hyperlipidemia    Insomnia    Morbid obesity with BMI of 45.0-49.9, adult ([facility])    Abnormal cardiovascular stress test    Coronary artery disease involving native coronary artery of native heart without angina pectoris    S/P three vessel coronary artery bypass       Plan:  1. Discussed will remain on dual antiplatelet therapy for at least 1 year given presentation of unstable angina. Will continue ASA 81mg life long. Medications renewed today. 2. Start lisinopril 10 mg daily and titrate as needed for goal blood pressure as it sounds like his blood pressures are running high in the mornings at home. Continue metoprolol at present dosing. Instructed to continue tracking his blood pressures at home. 3. Continue Lasix at 40 mg twice daily. Consider up titration should his symptoms and weight fail to improve over the next week or so. 4. Continue Lipitor daily at 40 mg nightly. 5.  Patient no longer participating in cardiac rehab. 6.  Check basic laboratories today including proBNP, basic metabolic profile and fasting lipids.   7.  If he continues to endorse episodes of fluttering in his chest after BP is controlled may consider Holter monitor to exclude atrial fibrillation

## 2021-01-29 NOTE — LETTER
CONSENT TO OPERATION  AND/OR OTHER PROCEDURE(S)          PATIENT : Lou Brown   YOB: 1960      DATE : 1/29/21          1. I request and consent that Dr. Thanh Bowden,  and/or his associates or assistants perform an operation and/or procedures on the above patient at  [place] [address], to treat the condition(s) which appear indicated by the diagnostic studies already performed. I have been told that in general terms the nature, purpose and reasonable expectations of the operation and/or procedure(s) are:       right Middle Finger Irrigation & Debridement with Revision Amputation and Bone Shortening with possible Nail Ablation      2. It has been explained to me by the informing physician that during the course of the operation and/or procedure(s) unforeseen conditions may be revealed that necessitate an extension of the original operation and/or procedure(s) or different operation and/or procedures than those set forth in Paragraph 1. I therefore authorize and request that my physician and/or his associates or assistants perform such operations and/or procedures as are necessary and desirable in the exercise of professional judgment. The authority granted under this Paragraph 2 shall extend to all conditions that require treatment and are known to my physician at the time the operation is commenced. 3. I have been made aware by the informing physician of certain risks and consequences that are associated with the operation and/or procedure(s) described in Paragraph 1, the reasonable alternative methods or treatment, the possible consequences, the possibility that the operation and/or procedure(s) may be unsuccessful and the possibility of complications.   I understand the reasonably known risks to be:      - Bleeding  - Infection  - Poor Healing  - Possible Damage to Nerve, Vessel, Tendon/Muscle or Bone  - Need for further Treatment/Surgery  - Stiffness  - Pain  - Residual or Recurrent Symptoms  - Anesthetic and/or Medical Risks  - We have discussed the specific limitations and risks of hospital and/or office based treatment at this time due to the COVID-19 pandemic                I have been counseled about the risks of pelon Covid-19 in the salvador-operative and post-operative periods related to this procedure. I have been made aware that pelon Covid-19 around the time of a surgical procedure may worsen my prognosis for recovering from the virus and lend to a higher morbidity and or mortality risk. With this knowledge, I have requested to proceed with the procedure as scheduled. 4. I have also been informed by the informing physician that there are other risks from both known and unknown causes that are attendant to the performance of any surgical procedure. I am aware that the practice of medicine and surgery is not an exact science, and that no guarantees have been made to me concerning the results of the operation and/or procedure(s). 5. I   CONSENT / REFUSE CONSENT  (strike the phrase that does not apply) to the taking of photographs before, during and/or after the operation or procedure for scientific/educational purposes. 6. I consent to the administration of anesthesia and to the use of such anesthetics as may be deemed advisable by the anesthesiologist who has been engaged by me or my physician.     7. I certify that I have read and understand the above consent to operation and/or other procedure(s); that the explanations therein referred to were made to me by the informing physician in advance of my signing this consent; that all blanks or statements requiring insertion or completion were filled in and inapplicable paragraphs, if any, were stricken before I signed; and that all questions asked by me about the operation and/or procedure(s) which I have consented to have been fully answered in a satisfactory manner.                                 _______________________           1/29/21                              Witness     Signature Of Patient         Date        José Migule Mendozagerard Stephens                                                 Informing Physician                                           Signature of Informing Physician                              If patient is unable to sign or is a minor, complete one of the following:    (A)  Patient is a minor   years of age. (B)  Patient is unable to sign because: The undersigned represents that he or she is duly authorized to execute this consent for and on behalf of the above named patient. Witness               o  Parent  o  Guardian   o  Spouse       o  Other (specify)                                                          Patient Name: Jade Kanner  Patient YOB: 1960  Dr. Shantelle Rutherford' Return To Work Policy  Regarding your ability to return to work after surgery or injury, Dr. Shantelle Rutherford will not state that any patient is off of work or cannot work at all. He will place you on restrictions after your surgical procedure or injury. This means that you will be allowed to return to work the day after your office visit or surgery with restrictions. Depending on the details of your particular situation, Dr. Shantelle Rutherford may state that you will have either light use or no use of your hand for a specific number of weeks. It is your obligation to communicate with your employer regarding your restrictions. It is your employer's decision as to whether they will accommodate your restrictions (i.e. allow you to come to work in your restricted capacity) or to not allow you to return to work under your restrictions. Dr. Shantelle Rutherford does not participate in making this decision and cannot influence your employer regarding their decision.   If you do not communicate your restrictions to your employer, or if you do not present to work as you are scheduled to, Dr. Denia Arce will not provide an 'excuse' to explain your absence. A doctors note, or official forms (BWC, FMLA, etc.) will be filled out, upon request, to indicate your date of surgery and your restrictions as stated above. Dr. Denia Arce' Narcotic Policy  Patients will only be prescribed narcotics after surgical procedures or significant injury. Not all procedures cause pain great enough to require Narcotics and thus, not all patients will receive prescriptions after surgical procedures or injuries. Narcotics are never prescribed for chronic conditions. Narcotics are never prescribed for use longer than one week at a time. Refills are only granted in unusual circumstances and only at Dr. Virginia Bender discretion. Patients who are receiving narcotic medication from another physician or who are under pain management contracts will not be given a prescription for narcotics for any reason. I have read the above policies and understand that by agreeing to proceed with treatment by Dr. Elise Hudson and his team, that I am agreeing to abide by these policies.   Patient Name:  Marily Bangura    Patient Signature:  _____________________________    Vik De Jesus Date:   1/29/21

## 2021-01-29 NOTE — Clinical Note
Dear  Samira Pierre, APRN - CNP,    Thank you very much for your referral or Mr. Lou Brown to me for evaluation and treatment of his Hand & Wrist condition. I appreciate your confidence in me and thank you for allowing me the opportunity to care for your patients. If I can be of any further assistance to you on this or any other patient, please do not hesitate to contact me. Sincerely,    Thanh Newton.  Fausto Bowden MD

## 2021-01-30 ENCOUNTER — HOSPITAL ENCOUNTER (OUTPATIENT)
Age: 61
Discharge: HOME | End: 2021-01-30
Payer: COMMERCIAL

## 2021-01-30 PROCEDURE — U0003 INFECTIOUS AGENT DETECTION BY NUCLEIC ACID (DNA OR RNA); SEVERE ACUTE RESPIRATORY SYNDROME CORONAVIRUS 2 (SARS-COV-2) (CORONAVIRUS DISEASE [COVID-19]), AMPLIFIED PROBE TECHNIQUE, MAKING USE OF HIGH THROUGHPUT TECHNOLOGIES AS DESCRIBED BY CMS-2020-01-R: HCPCS

## 2021-01-30 NOTE — PROGRESS NOTES
Mr. Marc Giron is a 64 y.o. right handed   who is seen today in Hand Surgical Consultation at the request of ANICETO Brownlee CNP. He is seen today regarding an injury occurring on January 22nd, 2021. He reports injuring his right Middle Finger, having  Had it caught in the tailgate of his dump truck. At the time of injury, there was clear distal skin loss  of the finger. He was seen for Emergency evaluation elsewhere, radiographs were obtained & he has been immobilized. By report, his skin injury (which appears to have been an avulsed piece of skin) was repaired after the wound was thoroughly cleansed. Mr. Marc Giron reports that he was told in ER that the underlying bone was exposed. The nail structures were not involved in the injury and did not require repair at the time. He reports moderate pain located in the Distal and Volar aspect of the Middle Finger, no tenderness of the wrist or elbow. He notes today, no neurologic symptoms in the Middle Finger. Symptoms show no change over time. I have today reviewed with Marc Giron the clinically relevant, past medical history, medications, allergies,  family history, social history, and Review Of Systems & I have documented any details relevant to today's presenting complaints in my history above. Mr. Neeru Delgado's self-reported past medical history, medications, allergies,  family history, social history, and Review Of Systems have been scanned into the chart under the \"Media\" tab. Physical Exam:  Mr. Neeru Delgado's most recent vitals:  Vitals  Temp: 98.1 °F (36.7 °C)  Temp Source: Infrared  Resp: 16  Height: 5' 4\" (162.6 cm)  Weight: 283 lb (128.4 kg)    He is well nourished, oriented to person, place & time. He demonstrates appropriate mood and affect as well as normal gait and station. Skin: There is  1cm volar pulp skin segment which was fully avulsed and circumferentially sutured back in place.   The involved skin is black and non-vital on the Volar right Middle Finger . The nail plate is present with a partial subungual hematoma. No other digits show sign of skin injury bilaterally. Digital range of motion is limited by pain in the injured digit on the Right, normal on the Left. FDS function is Intact, FDP function is Intact, common extensor function is Intact. Wrist range of motion is without significant limitation on the Right, normal on the Left. Sensation is subjectively decreased in the zone of injury, objectively absent in the necrotic skin but present surrounding. All other digits demonstrate normal sensation bilaterally. Vascular examination reveals normal and good capillary refill bilaterally. There is mild acute ecchymosis. Swelling is moderate in the Middle Finger, all other digits demonstrate no evidence of swelling bilaterally. There is no evidence of gross joint instability bilaterally. Muscular strength is clinically appropriate bilaterally. Maximal pain is elicited with palpation of the distal region of the Middle Finger about the pulp tip. The base of the hand & wrist are not tender to palpation. There is a 0 degree angular deformity and no clinical evidence of  mal-rotation of the injured digit. Radiographic Evaluation:  Radiographs are reviewed  today (2 views of the right Whole Hand). They do not demonstrate evidence of an acute fracture of the Distal Phalanx of the Middle Finger. There is no comminution, 0 degrees of angular deformity and no  mal-rotation. There is not evidence of other injury or bony fracture. Impression:  Mr. Donna Hernandez has sustained recent right Middle Finger skin avulsion injury without associated fracture. He  presents requesting further treatment. Plan:  I have discussed with Mr. Donna Hernandez the various treatment options for treatment of right  Ring Finger tip injury.   We discussed the options of Conservative treatment, and Surgical Irrigation and Debridement with revision Amputation with Bone Shortening, Closure and possible Nail Ablation. I have explained the pre-, salvador- ane post-operative considerations to him. He has elected to proceed with surgical treatment in the form of formal Irrigation & Debridement with revision Amputation with Bone Shortening, Closure and possible Nail Ablationof the injured digit(s). He has voiced an understanding of the other options available to him. I have explained the complications, limitations, expectations, alternatives, & risks of his chosen treatment. We discussed the possibility of residual symptoms as may be related to surgical treatment of Such finger tip injuries including the possiblities of: Infection, poor healing of skin, bone, nail structures, persistant deformity, persistant pain, limitation of motion, future arthritic symptoms & the possible need for further treatment. We also specifically discussed risks related to any surgical procedure including: bleeding, infection, scar formation, & possible need for repeat operation. He understood our discussion and was comfortable with his decision; he was provided with appropriate expectations. I had an extensive discussion with Mr. Rock Bae  and any family members present regarding the natural history, etiology, and long term consequences of this problem. I have outlined a treatment plan with them and, in my opinion, surgical intervention is indicated at this time. I have discussed with them the potential complications, limitations, expectations, alternatives, and risks of the procedure. He has had full opportunity to ask their questions. I have answered them all to his satisfaction.  I feel that the patient and any present family members do understand our discussion today and he has provided informed consent for Irrigation & Debridement with revision Amputation with Bone Shortening, Closure and possible Nail Ablation of his right  Ring Finger injuries. He is appropriately immobilized and protected until the time of the scheduled surgery.

## 2021-01-30 NOTE — PATIENT INSTRUCTIONS
Pre-Operative Instructions    1. The night before your surgery, unless otherwise instructed, do not eat any food, drink any liquids, chew gum or mints after midnight. Abstain from alcohol for 24 hours prior to surgery. 2. You will be contacted by the Hospital the working day prior to your procedure to confirm your arrival time. 3. Patients under 25years of age must have a parent or legal guardian present to sign their consent and discharge paperwork. 4. On the day of surgery,  you will be seen pre-operatively by an anesthesiologist.     5. If you are having hand surgery, it is recommended that nail polish and acrylic nails be removed prior to surgery if possible. 6. Please bring cases for glasses, contact lenses, hearing aids or dentures. They will likely be removed prior to surgery. 7. Wear casual, loose-fitting and comfortable clothing. Consider that you may have a large dressing to fit under your clothing after surgery. 9. Please do not bring valuables such as jewelry or large sums of cash to the hospital. Remove all body piercings before coming to the hospital. Mahogany Constantino may not  wear any rings on the hand if you are having surgery on that hand, wrist or elbow. 10. Do not smoke or chew tobacco before your surgery. [address] [place] [address] and surgery facilities are smoke-free environments. Smoking is not permitted anywhere on campus. 11. Be sure to follow any additional instructions from your physician. If the above conditions are not met, your surgery may be cancelled and rescheduled for another day. Should you develop any change in your health such as fever, cough, sore throat, cold, flu, or infection, or if you have any questions regarding your Pre-admission or surgery, please contact [address] - Surgery Scheduling at [phone], Monday through Friday, 9 a.m. to 5 p.m.

## 2021-01-31 LAB — SARS-COV-2: NOT DETECTED

## 2021-02-01 ENCOUNTER — OFFICE VISIT (OUTPATIENT)
Dept: FAMILY MEDICINE CLINIC | Age: 61
End: 2021-02-01
Payer: COMMERCIAL

## 2021-02-01 ENCOUNTER — TELEPHONE (OUTPATIENT)
Dept: ORTHOPEDIC SURGERY | Age: 61
End: 2021-02-01

## 2021-02-01 ENCOUNTER — HOSPITAL ENCOUNTER (OUTPATIENT)
Age: 61
Discharge: HOME | End: 2021-02-01
Payer: COMMERCIAL

## 2021-02-01 VITALS
OXYGEN SATURATION: 96 % | WEIGHT: 293 LBS | HEIGHT: 64 IN | TEMPERATURE: 97.2 F | SYSTOLIC BLOOD PRESSURE: 118 MMHG | HEART RATE: 71 BPM | BODY MASS INDEX: 50.02 KG/M2 | DIASTOLIC BLOOD PRESSURE: 70 MMHG

## 2021-02-01 DIAGNOSIS — Z01.818 PREOP EXAMINATION: Primary | ICD-10-CM

## 2021-02-01 DIAGNOSIS — Z01.818 PREOP EXAMINATION: ICD-10-CM

## 2021-02-01 LAB
ANION GAP SERPL CALCULATED.3IONS-SCNC: 7 MMOL/L (ref 3–16)
BASOPHILS ABSOLUTE: 0 K/UL (ref 0–0.2)
BASOPHILS RELATIVE PERCENT: 0.4 %
BUN BLDV-MCNC: 14 MG/DL (ref 7–20)
CALCIUM SERPL-MCNC: 8.9 MG/DL (ref 8.3–10.6)
CHLORIDE BLD-SCNC: 101 MMOL/L (ref 99–110)
CO2: 32 MMOL/L (ref 21–32)
CREAT SERPL-MCNC: 0.8 MG/DL (ref 0.9–1.3)
EOSINOPHILS ABSOLUTE: 0.5 K/UL (ref 0–0.6)
EOSINOPHILS RELATIVE PERCENT: 4.2 %
GFR AFRICAN AMERICAN: >60
GFR NON-AFRICAN AMERICAN: >60
GLUCOSE BLD-MCNC: 123 MG/DL (ref 70–99)
HCT VFR BLD CALC: 47.6 % (ref 40.5–52.5)
HEMOGLOBIN: 15.3 G/DL (ref 13.5–17.5)
LYMPHOCYTES ABSOLUTE: 2.5 K/UL (ref 1–5.1)
LYMPHOCYTES RELATIVE PERCENT: 22.2 %
MCH RBC QN AUTO: 27.6 PG (ref 26–34)
MCHC RBC AUTO-ENTMCNC: 32.2 G/DL (ref 31–36)
MCV RBC AUTO: 85.6 FL (ref 80–100)
MONOCYTES ABSOLUTE: 0.9 K/UL (ref 0–1.3)
MONOCYTES RELATIVE PERCENT: 8 %
NEUTROPHILS ABSOLUTE: 7.4 K/UL (ref 1.7–7.7)
NEUTROPHILS RELATIVE PERCENT: 65.2 %
PDW BLD-RTO: 15.8 % (ref 12.4–15.4)
PLATELET # BLD: 235 K/UL (ref 135–450)
PMV BLD AUTO: 7.9 FL (ref 5–10.5)
POTASSIUM SERPL-SCNC: 4.1 MMOL/L (ref 3.5–5.1)
RBC # BLD: 5.56 M/UL (ref 4.2–5.9)
SODIUM BLD-SCNC: 140 MMOL/L (ref 136–145)
WBC # BLD: 11.4 K/UL (ref 4–11)

## 2021-02-01 PROCEDURE — 36415 COLL VENOUS BLD VENIPUNCTURE: CPT

## 2021-02-01 PROCEDURE — 99243 OFF/OP CNSLTJ NEW/EST LOW 30: CPT | Performed by: NURSE PRACTITIONER

## 2021-02-01 PROCEDURE — 85025 COMPLETE CBC W/AUTO DIFF WBC: CPT

## 2021-02-01 PROCEDURE — 80048 BASIC METABOLIC PNL TOTAL CA: CPT

## 2021-02-01 NOTE — PATIENT INSTRUCTIONS
Take Metoprolol on morning of surgery with sip of water, and hold all other medications until after surgery, Discontinue ASA as cardiology recommends, Discontinue Plavix as cardiology recommends. Stop NSAIDS (Motrin, Aleve, Ibuprofen), vitamin E, aspirin, fish oil 7-10 days prior to surgery unless instructed otherwise by surgeon.

## 2021-02-01 NOTE — PROGRESS NOTES
[address]                             Preoperative Evaluation        Brad Mercado  YOB: 1960    Date of Service:  2/1/2021    Vitals:    02/01/21 1509   BP: 118/70   Site: Left Upper Arm   Position: Sitting   Cuff Size: Large Adult   Pulse: 71   Temp: 97.2 °F (36.2 °C)   SpO2: 96%   Weight: 293 lb (132.9 kg)   Height: 5' 4\" (1.626 m)      Wt Readings from Last 2 Encounters:   02/01/21 293 lb (132.9 kg)   01/29/21 283 lb (128.4 kg)     BP Readings from Last 3 Encounters:   02/01/21 118/70   01/22/21 (!) 157/85   01/13/21 (!) 134/90        Chief Complaint   Patient presents with   Zepeda Pre-op Exam     pt here for pre op for right middle finger amputation with Dr Demar Mcarthur on 2/12/21 at [facility]. Allergies   Allergen Reactions    Dilaudid [Hydromorphone Hcl] Nausea And Vomiting    Codeine Itching     Outpatient Medications Marked as Taking for the 2/1/21 encounter (Office Visit) with ANICETO Lynn CNP   Medication Sig Dispense Refill    oxyCODONE-acetaminophen (PERCOCET) 5-325 MG per tablet Take 1 tablet by mouth every 6 hours as needed for Pain for up to 7 days. Intended supply: 7 days.  Take lowest dose possible to manage pain 28 tablet 0    acetaminophen (TYLENOL) 500 MG tablet Take 2 tablets by mouth 3 times daily for 10 days 60 tablet 0    clopidogrel (PLAVIX) 75 MG tablet Take 1 tablet by mouth daily 90 tablet 3    lisinopril (PRINIVIL;ZESTRIL) 10 MG tablet Take 1 tablet by mouth daily 60 tablet 5    furosemide (LASIX) 40 MG tablet Take 1 tablet by mouth 2 times daily 180 tablet 3    traZODone (DESYREL) 50 MG tablet Take 1 tablet by mouth nightly 30 tablet 1    metoprolol tartrate (LOPRESSOR) 25 MG tablet Take 1 tablet by mouth 2 times daily Hold this medication for pulse <75 or systolic BP <463 90 tablet 3    venlafaxine (EFFEXOR XR) 150 MG extended release capsule Take 1 capsule by mouth daily 30 capsule 5    ibuprofen (ADVIL;MOTRIN) 800 MG tablet Take 1 tablet by mouth 3 times daily 90 tablet 1    atorvastatin (LIPITOR) 80 MG tablet Take 1 tablet by mouth nightly 90 tablet 3    aspirin 81 MG EC tablet Take 1 tablet by mouth daily 30 tablet 0    albuterol (PROVENTIL) (2.5 MG/3ML) 0.083% nebulizer solution Take 3 mLs by nebulization every 6 hours as needed for Wheezing or Shortness of Breath 25 vial 1    albuterol sulfate HFA (PROVENTIL HFA) 108 (90 Base) MCG/ACT inhaler Inhale 2 puffs into the lungs every 6 hours as needed for Wheezing 1 Inhaler 0       This patient presents to the office today for a preoperative consultation at the request of surgeon, Dr. Renae Will, who plans on performing right middle finger irrigation and debridement with revision amputation and bone shortening, possible nail ablation on February 2 at [facility].  The current problem began 1 month ago, and symptoms have been worsening with time. Conservative therapy: N/A.     Planned anesthesia: General   Known anesthesia problems: None   Bleeding risk: No recent or remote history of abnormal bleeding; Stopped Plavix 3 days ago  Personal or FH of DVT/PE: No    Patient objection to receiving blood products: He is okay with blood and blood products if necessary    Patient Active Problem List   Diagnosis    Crush injury of right foot    Shortness of breath    Chest pain    Erectile dysfunction    Hyperglycemia    Anxiety    Depression    Tobacco abuse    History of alcohol abuse    Fatigue    OANH (obstructive sleep apnea)    Hypersomnia    Chronic obstructive pulmonary disease (HCC)    Lumbar radiculopathy    HNP (herniated nucleus pulposus), lumbar    Spondylosis without myelopathy or radiculopathy, lumbar region    DDD (degenerative disc disease), lumbar    Lumbar spine pain    Acute respiratory distress    Class 3 severe obesity with body mass index (BMI) of 45.0 to 49.9 in adult [facility])    Pneumonia, primary atypical    Acute respiratory failure with hypoxia ([facility] [address]    Moderate protein-calorie malnutrition (HCC)    Acute respiratory failure (HCC)    Acute on chronic respiratory failure with hypoxemia (HCC)    Acute diastolic congestive heart failure (HCC)    Hyperlipidemia    Insomnia    Morbid obesity with BMI of 45.0-49.9, adult (HCC)    Abnormal cardiovascular stress test    Coronary artery disease involving native coronary artery of native heart without angina pectoris    S/P three vessel coronary artery bypass       Past Medical History:   Diagnosis Date    Anxiety 1/6/2020    Aortic valve calcification 09/2020    seen on lung CT    Chronic obstructive pulmonary disease ([facility] [address] 9/3/2019    PFT done 9/03/19    Coronary artery calcification 09/2020    seen on lung ct    Crush injury of right foot 7/23/2015    DDD (degenerative disc disease), lumbar 1/6/2020    Erectile dysfunction 1/6/2020    Fatigue 1/6/2020    History of alcohol abuse 1/6/2020    History of drug use     HNP (herniated nucleus pulposus), lumbar 1/6/2020    Hyperglycemia 1/6/2020    Hypersomnia 1/6/2020    Kidney stone     Lumbar radiculopathy 1/6/2020    Lumbar spine pain 1/6/2020    LVH (left ventricular hypertrophy)     seen on echo 8/2020, moderate    Observed sleep apnea 1/6/2020    Reactive depression 1/6/2020    Spondylosis without myelopathy or radiculopathy, lumbar region 1/6/2020    Tobacco use 1/6/2020     Past Surgical History:   Procedure Laterality Date    CHOLECYSTECTOMY      CORONARY ARTERY BYPASS GRAFT N/A 9/25/2020    CORONARY ARTERY BYPASS GRAFTING X3, INTERNAL MAMMARY ARTERY, SAPHENOUS VEIN GRAFT, ON PUMP, STERNAL PLATING, 5 LEVEL BILATERAL INTERCOSTAL NERVE BLOCK, PLATELET GEL APPLICATION performed by Jacky Cannon MD at [address] [facility]  03/16/2011    cystoscopy left ureteroscopy, left retrograde, double J stent placement    PAIN MANAGEMENT PROCEDURE Right 2019    RIGHT LUMBAR FIVE SACRAL ONE TRANSFORAMINAL EPIDURAL STEROID INJECTION SITE CONFIRMED BY FLUOROSCOPY performed by Silva Colon MD at [address] Right 2020    RIGHT LUMBAR FOUR AND LUMBAR FIVE TRANSFORAMINAL EPIDURAL STEROID INJECTION SITE CONFIRMED BY FLUOROSCOPY performed by Silva oClon MD at [facility]     Family History   Problem Relation Age of Onset    Heart Disease Mother     Other Mother         copd    Liver Disease Father     High Blood Pressure Sister     No Known Problems Sister      Social History     Socioeconomic History    Marital status:      Spouse name: Not on file    Number of children: Not on file    Years of education: Not on file    Highest education level: Not on file   Occupational History    Not on file   Social Needs    Financial resource strain: Not on file    Food insecurity     Worry: Not on file     Inability: Not on file    Transportation needs     Medical: Not on file     Non-medical: Not on file   Tobacco Use    Smoking status: Former Smoker     Packs/day: 2.00     Years: 35.00     Pack years: 70.00     Types: Cigarettes     Quit date: 3/1/2020     Years since quittin.9    Smokeless tobacco: Never Used   Substance and Sexual Activity    Alcohol use: No    Drug use: Not Currently     Comment: hx of drug use    Sexual activity: Not on file   Lifestyle    Physical activity     Days per week: Not on file     Minutes per session: Not on file    Stress: Not on file   Relationships    Social connections     Talks on phone: Not on file     Gets together: Not on file     Attends Confucianist service: Not on file     Active member of club or organization: Not on file     Attends meetings of clubs or organizations: Not on file     Relationship status: Not on file    Intimate partner violence     Fear of current or ex partner: Not on file     Emotionally abused: Not on file     Physically 4.5     Chloride 10/19/2020 103     CO2 10/19/2020 29     Anion Gap 10/19/2020 7     Glucose 10/19/2020 98     BUN 10/19/2020 16     CREATININE 10/19/2020 0.7*    GFR Non- 10/19/2020 >60     GFR  10/19/2020 >60     Calcium 10/19/2020 9.0     WBC 10/19/2020 11.2*    RBC 10/19/2020 4.68     Hemoglobin 10/19/2020 13.7     Hematocrit 10/19/2020 41.7     MCV 10/19/2020 89.1     MCH 10/19/2020 29.2     MCHC 10/19/2020 32.8     RDW 10/19/2020 15.3     Platelets 99/15/8520 258     MPV 10/19/2020 7.5     Neutrophils % 10/19/2020 64.2     Lymphocytes % 10/19/2020 24.4     Monocytes % 10/19/2020 6.5     Eosinophils % 10/19/2020 3.9     Basophils % 10/19/2020 1.0     Neutrophils Absolute 10/19/2020 7.2     Lymphocytes Absolute 10/19/2020 2.7     Monocytes Absolute 10/19/2020 0.7     Eosinophils Absolute 10/19/2020 0.4     Basophils Absolute 10/19/2020 0.1     Pro-BNP 10/19/2020 236*    Protime 10/19/2020 12.7     INR 10/19/2020 1.10     aPTT 10/19/2020 28.3     Troponin 10/19/2020 0.01    No results displayed because visit has over 200 results.       Admission on 09/18/2020, Discharged on 09/22/2020   Component Date Value    WBC 09/18/2020 11.6*    RBC 09/18/2020 5.54     Hemoglobin 09/18/2020 16.4     Hematocrit 09/18/2020 49.3     MCV 09/18/2020 88.9     MCH 09/18/2020 29.6     MCHC 09/18/2020 33.3     RDW 09/18/2020 14.7     Platelets 76/92/3647 211     MPV 09/18/2020 7.9     Neutrophils % 09/18/2020 71.8     Lymphocytes % 09/18/2020 20.6     Monocytes % 09/18/2020 5.0     Eosinophils % 09/18/2020 1.8     Basophils % 09/18/2020 0.8     Neutrophils Absolute 09/18/2020 8.3*    Lymphocytes Absolute 09/18/2020 2.4     Monocytes Absolute 09/18/2020 0.6     Eosinophils Absolute 09/18/2020 0.2     Basophils Absolute 09/18/2020 0.1     Sodium 09/18/2020 138     Potassium 09/18/2020 4.4     Chloride 09/18/2020 101     CO2 09/18/2020 30     Anion Gap 09/18/2020 7     Glucose 09/18/2020 119*    BUN 09/18/2020 10     CREATININE 09/18/2020 0.8*    GFR Non- 09/18/2020 >60     GFR  09/18/2020 >60     Calcium 09/18/2020 9.1     Troponin 09/18/2020 <0.01     Pro-BNP 09/18/2020 94     Ventricular Rate 09/18/2020 74     Atrial Rate 09/18/2020 74     P-R Interval 09/18/2020 174     QRS Duration 09/18/2020 76     Q-T Interval 09/18/2020 370     QTc Calculation (Bazett) 09/18/2020 410     P Axis 09/18/2020 51     R Axis 09/18/2020 -78     T Axis 09/18/2020 59     Diagnosis 09/18/2020 Normal sinus rhythmLeft axis deviationLow voltage QRSInferior infarct , age undeterminedCannot rule out Anterior infarct , age undeterminedAbnormal ECGNo previous ECGs availableConfirmed by DOIDE LOVE MD ([id number]) on 9/18/2020 5:48:23 PM     SARS-CoV-2, RUBI 09/19/2020 NOT DETECTED     Troponin 09/18/2020 <0.01     Troponin 09/18/2020 <0.01     Hemoglobin A1C 09/18/2020 6.3     eAG 09/18/2020 134.1     Left Ventricular Ejectio* 09/22/2020 56     LVEF MODALITY 09/22/2020 Nuclear     D-Dimer, Quant 09/18/2020 251*    Cholesterol, Total 09/19/2020 142     Triglycerides 09/19/2020 217*    HDL 09/19/2020 33*    LDL Calculated 09/19/2020 66     VLDL Cholesterol Calcula* 09/19/2020 43     WBC 09/19/2020 11.4*    RBC 09/19/2020 5.71     Hemoglobin 09/19/2020 17.1     Hematocrit 09/19/2020 52.0     MCV 09/19/2020 91.0     MCH 09/19/2020 29.9     MCHC 09/19/2020 32.8     RDW 09/19/2020 15.0     Platelets 58/52/3059 187     MPV 09/19/2020 7.9     Sodium 09/19/2020 133*    Potassium reflex Magnesi* 09/19/2020 4.5     Chloride 09/19/2020 96*    CO2 09/19/2020 32     Anion Gap 09/19/2020 5     Glucose 09/19/2020 115*    BUN 09/19/2020 12     CREATININE 09/19/2020 0.9     GFR Non- 09/19/2020 >60     GFR  09/19/2020 >60     Calcium 09/19/2020 9.3     Total Protein 09/19/2020 6.8     Albumin 09/19/2020 3.8     Albumin/Globulin Ratio 09/19/2020 1.3     Total Bilirubin 09/19/2020 0.7     Alkaline Phosphatase 09/19/2020 67     ALT 09/19/2020 26     AST 09/19/2020 20     Globulin 09/19/2020 3.0     pH, Arterial 09/18/2020 7.451*    pCO2, Arterial 09/18/2020 42.2     pO2, Arterial 09/18/2020 64.1*    HCO3, Arterial 09/18/2020 28.7     Base Excess, Arterial 09/18/2020 4.2*    Hemoglobin, Art, Extended 09/18/2020 17.2     O2 Sat, Arterial 09/18/2020 93.3     Carboxyhgb, Arterial 09/18/2020 1.4     Methemoglobin, Arterial 09/18/2020 0.3     TCO2, Arterial 09/18/2020 30.0     O2 Content, Arterial 09/18/2020 22     O2 Therapy 09/18/2020 Unknown     SARS-CoV-2, NAAT 09/19/2020 Not Detected     White Blood Cells (WBC),* 09/21/2020 *                    Value:POSITIVE  Normal Range:Negative      E Coli Shigella/Enteroin* 09/21/2020 Not Detected     Salmonella PCR 09/21/2020 Not Detected     CAMPYLOBACTER JEJUNI/COL* 09/21/2020 Not Detected     Campylobacter Upsaliensis 09/21/2020 Not Detected     Shiga Toxin I 09/21/2020 Not Detected     Shiga Toxin II 09/21/2020 Not Detected     Occult Blood Screening 09/21/2020                      Value:Result: Negative  Normal range: Negative      WBC 09/21/2020 11.4*    RBC 09/21/2020 5.31     Hemoglobin 09/21/2020 15.8     Hematocrit 09/21/2020 47.3     MCV 09/21/2020 89.0     MCH 09/21/2020 29.8     MCHC 09/21/2020 33.5     RDW 09/21/2020 14.4     Platelets 94/21/3273 183     MPV 09/21/2020 8.2     Neutrophils % 09/21/2020 89.3     Lymphocytes % 09/21/2020 8.9     Monocytes % 09/21/2020 1.5     Eosinophils % 09/21/2020 0.0     Basophils % 09/21/2020 0.3     Neutrophils Absolute 09/21/2020 10.2*    Lymphocytes Absolute 09/21/2020 1.0     Monocytes Absolute 09/21/2020 0.2     Eosinophils Absolute 09/21/2020 0.0     Basophils Absolute 09/21/2020 0.0     Sodium 09/21/2020 132*    Potassium 09/21/2020 4.7     Chloride 09/22/2020 0.3     Neutrophils Absolute 09/22/2020 11.9*    Lymphocytes Absolute 09/22/2020 2.3     Monocytes Absolute 09/22/2020 1.0     Eosinophils Absolute 09/22/2020 0.0     Basophils Absolute 09/22/2020 0.0     Sodium 09/22/2020 135*    Potassium 09/22/2020 4.0     Chloride 09/22/2020 96*    CO2 09/22/2020 33*    Anion Gap 09/22/2020 6     Glucose 09/22/2020 123*    BUN 09/22/2020 23*    CREATININE 09/22/2020 0.9     GFR Non- 09/22/2020 >60     GFR  09/22/2020 >60     Calcium 09/22/2020 9.4     C. difficile toxin Molec* 09/21/2020                      Value:Result: Clostridium difficile toxin B gene not detected.   Normal Range: Not detected      WBC 09/22/2020 14.6*    RBC 09/22/2020 6.06*    Hemoglobin 09/22/2020 18.1*    Hematocrit 09/22/2020 53.7*    MCV 09/22/2020 88.7     MCH 09/22/2020 29.8     MCHC 09/22/2020 33.6     RDW 09/22/2020 15.0     Platelets 55/68/3650 208     MPV 09/22/2020 8.2     aPTT 09/22/2020 35.7    Nurse Only on 09/11/2020   Component Date Value    WBC 09/11/2020 8.3     RBC 09/11/2020 5.35     Hemoglobin 09/11/2020 16.0     Hematocrit 09/11/2020 48.5     MCV 09/11/2020 90.7     MCH 09/11/2020 29.9     MCHC 09/11/2020 32.9     RDW 09/11/2020 15.1     Platelets 34/19/3444 186     MPV 09/11/2020 9.1     Neutrophils % 09/11/2020 67.2     Lymphocytes % 09/11/2020 23.1     Monocytes % 09/11/2020 6.1     Eosinophils % 09/11/2020 3.1     Basophils % 09/11/2020 0.5     Neutrophils Absolute 09/11/2020 5.6     Lymphocytes Absolute 09/11/2020 1.9     Monocytes Absolute 09/11/2020 0.5     Eosinophils Absolute 09/11/2020 0.3     Basophils Absolute 09/11/2020 0.0     PSA 09/11/2020 1.53     Sodium 09/11/2020 140     Potassium 09/11/2020 4.1     Chloride 09/11/2020 104     CO2 09/11/2020 26     Anion Gap 09/11/2020 10     Glucose 09/11/2020 120*    BUN 09/11/2020 17     CREATININE 09/11/2020 0.8*    GFR Non- 09/11/2020 >60     GFR  09/11/2020 >60     Calcium 09/11/2020 9.2     Total Protein 09/11/2020 5.8*    Albumin 09/11/2020 3.6     Albumin/Globulin Ratio 09/11/2020 1.6     Total Bilirubin 09/11/2020 0.4     Alkaline Phosphatase 09/11/2020 59     ALT 09/11/2020 22     AST 09/11/2020 17     Globulin 09/11/2020 2.2     Cholesterol, Total 09/11/2020 152     Triglycerides 09/11/2020 275*    HDL 09/11/2020 34*    LDL Calculated 09/11/2020 63     VLDL Cholesterol Calcula* 09/11/2020 55    Admission on 08/19/2020, Discharged on 08/23/2020   Component Date Value    Ventricular Rate 08/19/2020 73     Atrial Rate 08/19/2020 73     P-R Interval 08/19/2020 172     QRS Duration 08/19/2020 70     Q-T Interval 08/19/2020 370     QTc Calculation (Bazett) 08/19/2020 407     P Axis 08/19/2020 46     R Axis 08/19/2020 -50     T Axis 08/19/2020 63     Diagnosis 08/19/2020 Normal sinus rhythmLeft axis deviationLow voltage QRSInferior infarct , age undeterminedCannot rule out Anterior infarct , age undeterminedAbnormal ECGConfirmed by Devika Jones MD, [address] ([id number]) on 8/19/2020 12:24:27 PM     WBC 08/19/2020 10.9     RBC 08/19/2020 5.10     Hemoglobin 08/19/2020 15.3     Hematocrit 08/19/2020 46.0     MCV 08/19/2020 90.1     MCH 08/19/2020 30.0     MCHC 08/19/2020 33.3     RDW 08/19/2020 14.2     Platelets 51/55/1133 174     MPV 08/19/2020 8.0     Neutrophils % 08/19/2020 78.2     Lymphocytes % 08/19/2020 15.3     Monocytes % 08/19/2020 5.2     Eosinophils % 08/19/2020 1.0     Basophils % 08/19/2020 0.3     Neutrophils Absolute 08/19/2020 8.5*    Lymphocytes Absolute 08/19/2020 1.7     Monocytes Absolute 08/19/2020 0.6     Eosinophils Absolute 08/19/2020 0.1     Basophils Absolute 08/19/2020 0.0     Sodium 08/19/2020 143     Potassium reflex Magnesi* 08/19/2020 4.6     Chloride 08/19/2020 101     CO2 08/19/2020 32     Anion Gap 08/19/2020 10     Glucose 08/19/2020 137*    BUN 08/19/2020 21*    CREATININE 08/19/2020 0.9     GFR Non- 08/19/2020 >60     GFR  08/19/2020 >60     Calcium 08/19/2020 9.4     Total Protein 08/19/2020 6.2*    Albumin 08/19/2020 3.7     Albumin/Globulin Ratio 08/19/2020 1.5     Total Bilirubin 08/19/2020 0.6     Alkaline Phosphatase 08/19/2020 65     ALT 08/19/2020 31     AST 08/19/2020 21     Globulin 08/19/2020 2.5     Pro-BNP 08/19/2020 177*    SARS-CoV-2, RUBI 08/20/2020 NOT DETECTED     Left Ventricular Ejectio* 08/19/2020 58     LVEF MODALITY 08/19/2020 ECHO     Protime 08/19/2020 12.4     INR 08/19/2020 1.07     aPTT 08/19/2020 32.2     Fibrinogen 08/19/2020 503*    LD 08/19/2020 224*    Ferritin 08/19/2020 48.4     D-Dimer, Quant 08/19/2020 235*    SARS-CoV-2, NAAT 08/19/2020 Not Detected     WBC 08/20/2020 17.6*    RBC 08/20/2020 5.50     Hemoglobin 08/20/2020 15.8     Hematocrit 08/20/2020 49.3     MCV 08/20/2020 89.5     MCH 08/20/2020 28.7     MCHC 08/20/2020 32.0     RDW 08/20/2020 14.2     Platelets 21/72/9278 206     MPV 08/20/2020 8.4     Neutrophils % 08/20/2020 94.0     Lymphocytes % 08/20/2020 3.9     Monocytes % 08/20/2020 1.8     Eosinophils % 08/20/2020 0.0     Basophils % 08/20/2020 0.3     Neutrophils Absolute 08/20/2020 16.5*    Lymphocytes Absolute 08/20/2020 0.7*    Monocytes Absolute 08/20/2020 0.3     Eosinophils Absolute 08/20/2020 0.0     Basophils Absolute 08/20/2020 0.1     Sodium 08/20/2020 135*    Potassium reflex Magnesi* 08/20/2020 4.9     Chloride 08/20/2020 97*    CO2 08/20/2020 31     Anion Gap 08/20/2020 7     Glucose 08/20/2020 141*    BUN 08/20/2020 19     CREATININE 08/20/2020 0.7*    GFR Non- 08/20/2020 >60     GFR  08/20/2020 >60     Calcium 08/20/2020 9.1     Procalcitonin 08/20/2020 0.04     Sodium 08/21/2020 135*    Potassium 08/21/2020 4.8     Chloride 08/21/2020 96*    CO2 08/21/2020 35*    Anion Gap 08/21/2020 4     Glucose 08/21/2020 145*    BUN 08/21/2020 27*    CREATININE 08/21/2020 0.8*    GFR Non- 08/21/2020 >60     GFR  08/21/2020 >60     Calcium 08/21/2020 9.3     Magnesium 08/21/2020 2.50*    Phosphorus 08/21/2020 3.6     Sodium 08/22/2020 137     Potassium 08/22/2020 4.1     Chloride 08/22/2020 96*    CO2 08/22/2020 31     Anion Gap 08/22/2020 10     Glucose 08/22/2020 124*    BUN 08/22/2020 26*    CREATININE 08/22/2020 0.8*    GFR Non- 08/22/2020 >60     GFR  08/22/2020 >60     Calcium 08/22/2020 9.2     WBC 08/22/2020 17.6*    RBC 08/22/2020 5.77     Hemoglobin 08/22/2020 17.1     Hematocrit 08/22/2020 52.4     MCV 08/22/2020 90.8     MCH 08/22/2020 29.6     MCHC 08/22/2020 32.6     RDW 08/22/2020 14.6     Platelets 14/95/1469 210     MPV 08/22/2020 8.3     Sodium 08/23/2020 138     Potassium 08/23/2020 3.8     Chloride 08/23/2020 96*    CO2 08/23/2020 31     Anion Gap 08/23/2020 11     Glucose 08/23/2020 94     BUN 08/23/2020 24*    CREATININE 08/23/2020 0.9     GFR Non- 08/23/2020 >60     GFR  08/23/2020 >60     Calcium 08/23/2020 9.1     pH, Evan 08/23/2020 7.434     pCO2, Evan 08/23/2020 46.2     pO2, Evan 08/23/2020 45.6*    HCO3, Venous 08/23/2020 30.3*    Base Excess, Evan 08/23/2020 4.9*    O2 Sat, Evan 08/23/2020 83     Carboxyhemoglobin 08/23/2020 1.9*    MetHgb, Evan 08/23/2020 0.3     TC02 (Calc), Evan 08/23/2020 32     O2 Content, Evan 08/23/2020 22     O2 Therapy 08/23/2020 Unknown            Assessment:       64 y.o. patient with planned surgery as above.     Known risk factors for perioperative complications: Coronary artery disease, Congestive heart failure, COPD, Obstructive sleep apnea, Former tobacco abuse, hyperglycemia  Current medications which may produce withdrawal symptoms if withheld perioperatively: Percocet      1. Preop examination         Plan:     1. Preoperative workup as follows: CBC, BMP   2. Further recommendations from consultants: No, cardiac clearance given already   3. Change in medication regimen before surgery: Take Metoprolol on morning of surgery with sip of water, and hold all other medications until after surgery, Discontinue ASA as cardiology recommends, Discontinue Plavix as cardiology recommends. Stop NSAIDS (Motrin, Aleve, Ibuprofen), vitamin E, aspirin, fish oil 7-10 days prior to surgery unless instructed otherwise by surgeon. 4. Prophylaxis for cardiac events with perioperative beta-blockers: Currently taking  metoprolol  ACC/AHA indications for pre-operative beta-blocker use:    · Vascular surgery with history of postitive stress test  · Intermediate or high risk surgery with history of CAD   · Intermediate or high risk surgery with multiple clinical predictors of CAD- 2 of the following: history of compensated or prior heart failure, history of cerebrovascular disease, DM, or renal insufficiency    Routine administration of higher-dose, long-acting metoprolol in beta-blocker-naïve patients on the day of surgery, and in the absence of dose titration is associated with an overall increase in mortality. Beta-blockers should be started days to weeks prior to surgery and titrated to pulse < 70.  5. Deep vein thrombosis prophylaxis: regimen to be chosen by surgical team  6. No contraindications to planned surgery pending cardiology clearance      If you have questions, please do not hesitate to call me ([phone]).      Sincerely,        Guevara Corrales, CNP

## 2021-02-02 ENCOUNTER — ANESTHESIA EVENT (OUTPATIENT)
Dept: OPERATING ROOM | Age: 61
End: 2021-02-02

## 2021-02-02 ENCOUNTER — HOSPITAL ENCOUNTER (OUTPATIENT)
Age: 61
Setting detail: OUTPATIENT SURGERY
Discharge: HOME | End: 2021-02-02
Attending: ORTHOPAEDIC SURGERY | Admitting: ORTHOPAEDIC SURGERY

## 2021-02-02 ENCOUNTER — ANESTHESIA (OUTPATIENT)
Dept: OPERATING ROOM | Age: 61
End: 2021-02-02

## 2021-02-02 VITALS
RESPIRATION RATE: 7 BRPM | DIASTOLIC BLOOD PRESSURE: 63 MMHG | SYSTOLIC BLOOD PRESSURE: 120 MMHG | OXYGEN SATURATION: 97 %

## 2021-02-02 VITALS
RESPIRATION RATE: 22 BRPM | HEIGHT: 64 IN | OXYGEN SATURATION: 97 % | TEMPERATURE: 97.1 F | BODY MASS INDEX: 48.32 KG/M2 | WEIGHT: 283 LBS | SYSTOLIC BLOOD PRESSURE: 113 MMHG | HEART RATE: 98 BPM | DIASTOLIC BLOOD PRESSURE: 59 MMHG

## 2021-02-02 DIAGNOSIS — S68.119A AMPUTATION OF FINGER WITHOUT COMPLICATION, INITIAL ENCOUNTER: Primary | ICD-10-CM

## 2021-02-02 PROCEDURE — 7100000010 HC PHASE II RECOVERY - FIRST 15 MIN: Performed by: ORTHOPAEDIC SURGERY

## 2021-02-02 PROCEDURE — 3700000000 HC ANESTHESIA ATTENDED CARE: Performed by: ORTHOPAEDIC SURGERY

## 2021-02-02 PROCEDURE — 2580000003 HC RX 258: Performed by: ANESTHESIOLOGY

## 2021-02-02 PROCEDURE — 3700000001 HC ADD 15 MINUTES (ANESTHESIA): Performed by: ORTHOPAEDIC SURGERY

## 2021-02-02 PROCEDURE — 7100000011 HC PHASE II RECOVERY - ADDTL 15 MIN: Performed by: ORTHOPAEDIC SURGERY

## 2021-02-02 PROCEDURE — 6360000002 HC RX W HCPCS: Performed by: NURSE ANESTHETIST, CERTIFIED REGISTERED

## 2021-02-02 PROCEDURE — 6360000002 HC RX W HCPCS: Performed by: ANESTHESIOLOGY

## 2021-02-02 PROCEDURE — 6360000002 HC RX W HCPCS: Performed by: ORTHOPAEDIC SURGERY

## 2021-02-02 PROCEDURE — 2500000003 HC RX 250 WO HCPCS: Performed by: ORTHOPAEDIC SURGERY

## 2021-02-02 PROCEDURE — 3600000013 HC SURGERY LEVEL 3 ADDTL 15MIN: Performed by: ORTHOPAEDIC SURGERY

## 2021-02-02 PROCEDURE — 7100000000 HC PACU RECOVERY - FIRST 15 MIN: Performed by: ORTHOPAEDIC SURGERY

## 2021-02-02 PROCEDURE — 2709999900 HC NON-CHARGEABLE SUPPLY: Performed by: ORTHOPAEDIC SURGERY

## 2021-02-02 PROCEDURE — 2580000003 HC RX 258: Performed by: ORTHOPAEDIC SURGERY

## 2021-02-02 PROCEDURE — 3600000003 HC SURGERY LEVEL 3 BASE: Performed by: ORTHOPAEDIC SURGERY

## 2021-02-02 PROCEDURE — 2500000003 HC RX 250 WO HCPCS: Performed by: NURSE ANESTHETIST, CERTIFIED REGISTERED

## 2021-02-02 RX ORDER — DEXAMETHASONE SODIUM PHOSPHATE 4 MG/ML
INJECTION, SOLUTION INTRA-ARTICULAR; INTRALESIONAL; INTRAMUSCULAR; INTRAVENOUS; SOFT TISSUE PRN
Status: DISCONTINUED | OUTPATIENT
Start: 2021-02-02 | End: 2021-02-02 | Stop reason: SDUPTHER

## 2021-02-02 RX ORDER — ONDANSETRON 2 MG/ML
INJECTION INTRAMUSCULAR; INTRAVENOUS PRN
Status: DISCONTINUED | OUTPATIENT
Start: 2021-02-02 | End: 2021-02-02 | Stop reason: SDUPTHER

## 2021-02-02 RX ORDER — HYDROCODONE BITARTRATE AND ACETAMINOPHEN 5; 325 MG/1; MG/1
1 TABLET ORAL EVERY 6 HOURS PRN
Qty: 10 TABLET | Refills: 0 | Status: SHIPPED | OUTPATIENT
Start: 2021-02-02 | End: 2021-02-09

## 2021-02-02 RX ORDER — HYDRALAZINE HYDROCHLORIDE 20 MG/ML
5 INJECTION INTRAMUSCULAR; INTRAVENOUS EVERY 10 MIN PRN
Status: DISCONTINUED | OUTPATIENT
Start: 2021-02-02 | End: 2021-02-02 | Stop reason: HOSPADM

## 2021-02-02 RX ORDER — BUPIVACAINE HYDROCHLORIDE 5 MG/ML
INJECTION, SOLUTION EPIDURAL; INTRACAUDAL PRN
Status: DISCONTINUED | OUTPATIENT
Start: 2021-02-02 | End: 2021-02-02 | Stop reason: ALTCHOICE

## 2021-02-02 RX ORDER — FENTANYL CITRATE 50 UG/ML
50 INJECTION, SOLUTION INTRAMUSCULAR; INTRAVENOUS EVERY 5 MIN PRN
Status: DISCONTINUED | OUTPATIENT
Start: 2021-02-02 | End: 2021-02-02 | Stop reason: HOSPADM

## 2021-02-02 RX ORDER — SODIUM CHLORIDE 0.9 % (FLUSH) 0.9 %
10 SYRINGE (ML) INJECTION EVERY 12 HOURS SCHEDULED
Status: DISCONTINUED | OUTPATIENT
Start: 2021-02-02 | End: 2021-02-02 | Stop reason: HOSPADM

## 2021-02-02 RX ORDER — SODIUM CHLORIDE 0.9 % (FLUSH) 0.9 %
10 SYRINGE (ML) INJECTION PRN
Status: DISCONTINUED | OUTPATIENT
Start: 2021-02-02 | End: 2021-02-02 | Stop reason: HOSPADM

## 2021-02-02 RX ORDER — EPHEDRINE SULFATE 50 MG/ML
INJECTION, SOLUTION INTRAVENOUS PRN
Status: DISCONTINUED | OUTPATIENT
Start: 2021-02-02 | End: 2021-02-02 | Stop reason: SDUPTHER

## 2021-02-02 RX ORDER — OXYCODONE HYDROCHLORIDE AND ACETAMINOPHEN 5; 325 MG/1; MG/1
1 TABLET ORAL PRN
Status: DISCONTINUED | OUTPATIENT
Start: 2021-02-02 | End: 2021-02-02 | Stop reason: HOSPADM

## 2021-02-02 RX ORDER — FENTANYL CITRATE 50 UG/ML
INJECTION, SOLUTION INTRAMUSCULAR; INTRAVENOUS PRN
Status: DISCONTINUED | OUTPATIENT
Start: 2021-02-02 | End: 2021-02-02 | Stop reason: SDUPTHER

## 2021-02-02 RX ORDER — ONDANSETRON 2 MG/ML
4 INJECTION INTRAMUSCULAR; INTRAVENOUS PRN
Status: DISCONTINUED | OUTPATIENT
Start: 2021-02-02 | End: 2021-02-02 | Stop reason: HOSPADM

## 2021-02-02 RX ORDER — PROPOFOL 10 MG/ML
INJECTION, EMULSION INTRAVENOUS PRN
Status: DISCONTINUED | OUTPATIENT
Start: 2021-02-02 | End: 2021-02-02 | Stop reason: SDUPTHER

## 2021-02-02 RX ORDER — PHENYLEPHRINE HCL IN 0.9% NACL 1 MG/10 ML
SYRINGE (ML) INTRAVENOUS PRN
Status: DISCONTINUED | OUTPATIENT
Start: 2021-02-02 | End: 2021-02-02 | Stop reason: SDUPTHER

## 2021-02-02 RX ORDER — OXYCODONE HYDROCHLORIDE AND ACETAMINOPHEN 5; 325 MG/1; MG/1
2 TABLET ORAL PRN
Status: DISCONTINUED | OUTPATIENT
Start: 2021-02-02 | End: 2021-02-02 | Stop reason: HOSPADM

## 2021-02-02 RX ORDER — SODIUM CHLORIDE, SODIUM LACTATE, POTASSIUM CHLORIDE, CALCIUM CHLORIDE 600; 310; 30; 20 MG/100ML; MG/100ML; MG/100ML; MG/100ML
INJECTION, SOLUTION INTRAVENOUS CONTINUOUS
Status: DISCONTINUED | OUTPATIENT
Start: 2021-02-02 | End: 2021-02-02 | Stop reason: HOSPADM

## 2021-02-02 RX ORDER — FENTANYL CITRATE 50 UG/ML
INJECTION, SOLUTION INTRAMUSCULAR; INTRAVENOUS
Status: DISCONTINUED
Start: 2021-02-02 | End: 2021-02-02 | Stop reason: HOSPADM

## 2021-02-02 RX ORDER — PROMETHAZINE HYDROCHLORIDE 25 MG/ML
6.25 INJECTION, SOLUTION INTRAMUSCULAR; INTRAVENOUS
Status: DISCONTINUED | OUTPATIENT
Start: 2021-02-02 | End: 2021-02-02 | Stop reason: HOSPADM

## 2021-02-02 RX ORDER — LABETALOL HYDROCHLORIDE 5 MG/ML
5 INJECTION, SOLUTION INTRAVENOUS EVERY 10 MIN PRN
Status: DISCONTINUED | OUTPATIENT
Start: 2021-02-02 | End: 2021-02-02 | Stop reason: HOSPADM

## 2021-02-02 RX ORDER — MAGNESIUM HYDROXIDE 1200 MG/15ML
LIQUID ORAL CONTINUOUS PRN
Status: COMPLETED | OUTPATIENT
Start: 2021-02-02 | End: 2021-02-02

## 2021-02-02 RX ORDER — DIPHENHYDRAMINE HYDROCHLORIDE 50 MG/ML
12.5 INJECTION INTRAMUSCULAR; INTRAVENOUS
Status: DISCONTINUED | OUTPATIENT
Start: 2021-02-02 | End: 2021-02-02 | Stop reason: HOSPADM

## 2021-02-02 RX ORDER — MEPERIDINE HYDROCHLORIDE 50 MG/ML
12.5 INJECTION INTRAMUSCULAR; INTRAVENOUS; SUBCUTANEOUS EVERY 5 MIN PRN
Status: DISCONTINUED | OUTPATIENT
Start: 2021-02-02 | End: 2021-02-02 | Stop reason: HOSPADM

## 2021-02-02 RX ORDER — LIDOCAINE HYDROCHLORIDE 10 MG/ML
0.3 INJECTION, SOLUTION EPIDURAL; INFILTRATION; INTRACAUDAL; PERINEURAL
Status: DISCONTINUED | OUTPATIENT
Start: 2021-02-02 | End: 2021-02-02 | Stop reason: HOSPADM

## 2021-02-02 RX ORDER — FENTANYL CITRATE 50 UG/ML
50 INJECTION, SOLUTION INTRAMUSCULAR; INTRAVENOUS ONCE
Status: COMPLETED | OUTPATIENT
Start: 2021-02-02 | End: 2021-02-02

## 2021-02-02 RX ORDER — LIDOCAINE HYDROCHLORIDE 20 MG/ML
INJECTION, SOLUTION EPIDURAL; INFILTRATION; INTRACAUDAL; PERINEURAL PRN
Status: DISCONTINUED | OUTPATIENT
Start: 2021-02-02 | End: 2021-02-02 | Stop reason: SDUPTHER

## 2021-02-02 RX ORDER — FENTANYL CITRATE 50 UG/ML
25 INJECTION, SOLUTION INTRAMUSCULAR; INTRAVENOUS EVERY 5 MIN PRN
Status: DISCONTINUED | OUTPATIENT
Start: 2021-02-02 | End: 2021-02-02 | Stop reason: HOSPADM

## 2021-02-02 RX ADMIN — EPHEDRINE SULFATE 15 MG: 50 INJECTION INTRAMUSCULAR; INTRAVENOUS; SUBCUTANEOUS at 13:53

## 2021-02-02 RX ADMIN — ONDANSETRON 4 MG: 2 INJECTION INTRAMUSCULAR; INTRAVENOUS at 14:09

## 2021-02-02 RX ADMIN — Medication 3 G: at 13:46

## 2021-02-02 RX ADMIN — FENTANYL CITRATE 100 MCG: 50 INJECTION, SOLUTION INTRAMUSCULAR; INTRAVENOUS at 13:49

## 2021-02-02 RX ADMIN — Medication 100 MCG: at 13:58

## 2021-02-02 RX ADMIN — LIDOCAINE HYDROCHLORIDE 50 MG: 20 INJECTION, SOLUTION EPIDURAL; INFILTRATION; INTRACAUDAL; PERINEURAL at 13:49

## 2021-02-02 RX ADMIN — SODIUM CHLORIDE, POTASSIUM CHLORIDE, SODIUM LACTATE AND CALCIUM CHLORIDE: 600; 310; 30; 20 INJECTION, SOLUTION INTRAVENOUS at 12:45

## 2021-02-02 RX ADMIN — DEXAMETHASONE SODIUM PHOSPHATE 10 MG: 4 INJECTION, SOLUTION INTRAMUSCULAR; INTRAVENOUS at 14:09

## 2021-02-02 RX ADMIN — PROPOFOL 250 MG: 10 INJECTION, EMULSION INTRAVENOUS at 13:49

## 2021-02-02 RX ADMIN — FENTANYL CITRATE 50 MCG: 50 INJECTION, SOLUTION INTRAMUSCULAR; INTRAVENOUS at 12:59

## 2021-02-02 ASSESSMENT — PAIN DESCRIPTION - DESCRIPTORS
DESCRIPTORS: THROBBING
DESCRIPTORS: ACHING

## 2021-02-02 ASSESSMENT — PULMONARY FUNCTION TESTS
PIF_VALUE: 0
PIF_VALUE: 6
PIF_VALUE: 18
PIF_VALUE: 0
PIF_VALUE: 34
PIF_VALUE: 3
PIF_VALUE: 1
PIF_VALUE: 1
PIF_VALUE: 21
PIF_VALUE: 5
PIF_VALUE: 2
PIF_VALUE: 0
PIF_VALUE: 3
PIF_VALUE: 32
PIF_VALUE: 40
PIF_VALUE: 1

## 2021-02-02 ASSESSMENT — PAIN SCALES - GENERAL: PAINLEVEL_OUTOF10: 0

## 2021-02-02 ASSESSMENT — ENCOUNTER SYMPTOMS: SHORTNESS OF BREATH: 1

## 2021-02-02 ASSESSMENT — PAIN DESCRIPTION - INTENSITY: RATING_2: 0

## 2021-02-02 NOTE — PROGRESS NOTES
Advancing hob without compaints  Tolerating Drinking Attempted to implement non pharmacological methods  of pain management-repositioned/ ice in place   No pain   No ponv  Pt alert and appropriate  Respirations  Easy/ unlabored/ no Chest pain or SOB   Surgical drsg unchanged from initial assessment  Circulation checks on operative limb unchanged from last entry in pacu

## 2021-02-02 NOTE — OP NOTE
OPERATIVE REPORT          Patient:  Alexandr Porter    YOB: 1960  Date of Service:  2/2/2021    Location:  [facility]      Preoperative Diagnosis:  Right Middle Finger Distal Revision Amputation    Postoperative Diagnosis:  Same. Procedure:  Irrigation and Debridement with bone shortening and revision amputation of Right Middle Finger at the Distal Phalanx tuft. Surgeon:    Cristy Johnson. Luiza Camacho MD    Surgical Assistant:    CLAUDIO Aviles Assistant    Anesthesia:  General    Blood Loss:  Minimal.     Complications:  None. Tourniquet Time:  6 minutes. Indications:  Mr. Alexandr Porter is a 64y.o. year old male who sustained traumatic distal injury to the   Right Middle Finger. I have discussed with him preoperatively the complications, limitations, expectations, alternatives and risks of the surgical care including the shortening and revision amputation of the finger which he understood. All of his questions have been fully answered, and Mr. Alexandr Porter has provided written informed consent to proceed. After written consent was obtained and the proper operative site was identified and marked, Mr. Alexandr Porter was brought to the operating room, placed in the supine position on the operating room table with the Right arm extended upon a hand table. The planned anesthesia was administered by the anesthesia service and the Right upper extremity was prepped and draped in the usual sterile fashion. After Eshmarch exsanguination, the pneumo-tourniquet was inflated to 250 milimeters of mercury about the upper arm. The traumatic wound was explored and any nonvital or obviously damaged or infected tissue was sharply removed down to the level of the bone. Revision amputation level was selected and incision was fashioned directly about the  Middle Finger at the planned level of amputation.   Dissection was carried carefully through the subcutaneous tissue identifying and protecting the neurovascular structures. The skin flaps were raised full thickness and were carefully retracted. The Bone was transected at the planned level and the end was contoured to a comfortable shape. The nail structures were intact and did not require any further manipulation. The wound was irrigated copiously with sterile saline for irrigation. The pneumo-tourniquet was deflated after a period of 6 minutes elevation. The fingers & flap were immediately pink and well perfused. Hemostasis was easily obtained with direct pressure and electrocautery. The flaps were inset and approximated and the wound was closed with interrupted absorbable sutures in the skin. The wound was dressed with adaptic, dry sterile dressings, and a very well padded hand and finger dressing was applied. Mr. Shana Mccoy  was awakened from anesthesia having tolerated the procedure without apparent complication, and was returned to the recovery room in stable condition. At the conclusion of the procedure all needle, instrument, and sponge counts were correct. Harris Lim MD   2/2/2021 , 2:17 PM

## 2021-02-02 NOTE — ANESTHESIA PRE PROCEDURE
Department of Anesthesiology  Preprocedure Note       Name:  Brad Mercado   Age:  64 y.o.  :  1960                                          MRN:  [id number]         Date:  2021      Surgeon: Eri Salazar):  Rosa Reeves MD    Procedure: Procedure(s):  RIGHT MIDDLE FINGER IRRIGATION AND DEBRIDEMENT WITH REVISION AMPUTATION AND BONE SHORTENING, POSSIBLE NAIL ABLATION    Medications prior to admission:   Prior to Admission medications    Medication Sig Start Date End Date Taking? Authorizing Provider   oxyCODONE-acetaminophen (PERCOCET) 5-325 MG per tablet Take 1 tablet by mouth every 6 hours as needed for Pain for up to 7 days. Intended supply: 7 days.  Take lowest dose possible to manage pain 1/27/21 2/3/21 Yes Lorena Nino MD   lisinopril (PRINIVIL;ZESTRIL) 10 MG tablet Take 1 tablet by mouth daily 21  Yes Guillermina Montoya MD   furosemide (LASIX) 40 MG tablet Take 1 tablet by mouth 2 times daily 21 Yes Guillermina Montoya MD   metoprolol tartrate (LOPRESSOR) 25 MG tablet Take 1 tablet by mouth 2 times daily Hold this medication for pulse <49 or systolic BP <462   Yes [employer] - CNP   ibuprofen (ADVIL;MOTRIN) 800 MG tablet Take 1 tablet by mouth 3 times daily 10/30/20  Yes Lewis LISA MooneyN - CNP   aspirin 81 MG EC tablet Take 1 tablet by mouth daily 20  Yes ANICETO Fulton - CNP   albuterol (PROVENTIL) (2.5 MG/3ML) 0.083% nebulizer solution Take 3 mLs by nebulization every 6 hours as needed for Wheezing or Shortness of Breath 19  Yes [employer], APRN - CNP   acetaminophen (TYLENOL) 500 MG tablet Take 2 tablets by mouth 3 times daily for 10 days 21  Herman Marte MD   clopidogrel (PLAVIX) 75 MG tablet Take 1 tablet by mouth daily 21   Guillermina Montoya MD   traZODone (DESYREL) 50 MG tablet Take 1 tablet by mouth nightly 10/30/20   Lewissofi Mooney APRN - CNP   venlafaxine (EFFEXOR XR) 150 MG extended release capsule Take 1 capsule by mouth daily 10/30/20   Jose Enrique Courtney APRN - CNP   atorvastatin (LIPITOR) 80 MG tablet Take 1 tablet by mouth nightly 10/28/20   Terrell Vasquez MD   albuterol sulfate HFA (PROVENTIL HFA) 108 (90 Base) MCG/ACT inhaler Inhale 2 puffs into the lungs every 6 hours as needed for Wheezing 5/17/19   Clarisse Hayes MD       Current medications:    Current Facility-Administered Medications   Medication Dose Route Frequency Provider Last Rate Last Admin    lactated ringers infusion   Intravenous Continuous Charles Funez  mL/hr at 02/02/21 1245 New Bag at 02/02/21 1245    sodium chloride flush 0.9 % injection 10 mL  10 mL Intravenous 2 times per day Charles Funez MD        sodium chloride flush 0.9 % injection 10 mL  10 mL Intravenous PRN Charles Funez MD        lidocaine PF 1 % injection 0.3 mL  0.3 mL Intradermal Once PRN Charles Funez MD        ceFAZolin (ANCEF) 3000 mg in dextrose 5 % 100 mL IVPB  3,000 mg Intravenous On Call to Bella Reid MD           Allergies:     Allergies   Allergen Reactions    Dilaudid [Hydromorphone Hcl] Nausea And Vomiting    Codeine Itching       Problem List:    Patient Active Problem List   Diagnosis Code    Crush injury of right foot S97.81XA    Shortness of breath R06.02    Chest pain R07.9    Erectile dysfunction N52.9    Hyperglycemia R73.9    Anxiety F41.9    Depression F32.9    Tobacco abuse Z72.0    History of alcohol abuse F10.11    Fatigue R53.83    OANH (obstructive sleep apnea) G47.33    Hypersomnia G47.10    Chronic obstructive pulmonary disease (HCC) J44.9    Lumbar radiculopathy M54.16    HNP (herniated nucleus pulposus), lumbar M51.26    Spondylosis without myelopathy or radiculopathy, lumbar region M47.816    DDD (degenerative disc disease), lumbar M51.36    Lumbar spine pain M54.5    Acute respiratory distress R06.03    Class 3 severe obesity with body mass index (BMI) of 45.0 to 49.9 in [facility]) E66.01, Z68.42    Pneumonia, primary atypical J18.9    Acute respiratory failure with hypoxia (HCC) J96.01    Moderate protein-calorie malnutrition (HCC) E44.0    Acute respiratory failure (HCC) J96.00    Acute on chronic respiratory failure with hypoxemia (HCC) J96.21    Acute diastolic congestive heart failure (HCC) I50.31    Hyperlipidemia E78.5    Insomnia G47.00    Morbid obesity with BMI of 45.0-49.9, adult (HCC) E66.01, Z68.42    Abnormal cardiovascular stress test R94.39    Coronary artery disease involving native coronary artery of native heart without angina pectoris I25.10    S/P three vessel coronary artery bypass Z95.1       Past Medical History:        Diagnosis Date    Anxiety 1/6/2020    Aortic valve calcification 09/2020    seen on lung CT    Chronic obstructive pulmonary disease ([facility] [address] 9/3/2019    PFT done 9/03/19    Coronary artery calcification 09/2020    seen on lung ct    Crush injury of right foot 7/23/2015    DDD (degenerative disc disease), lumbar 1/6/2020    Erectile dysfunction 1/6/2020    Fatigue 1/6/2020    History of alcohol abuse 1/6/2020    History of drug use     HNP (herniated nucleus pulposus), lumbar 1/6/2020    Hyperglycemia 1/6/2020    Hypersomnia 1/6/2020    Kidney stone     Lumbar radiculopathy 1/6/2020    Lumbar spine pain 1/6/2020    LVH (left ventricular hypertrophy)     seen on echo 8/2020, moderate    Observed sleep apnea 1/6/2020    Reactive depression 1/6/2020    Spondylosis without myelopathy or radiculopathy, lumbar region 1/6/2020    Tobacco use 1/6/2020       Past Surgical History:        Procedure Laterality Date    CHOLECYSTECTOMY      CORONARY ARTERY BYPASS GRAFT N/A 9/25/2020    CORONARY ARTERY BYPASS GRAFTING X3, INTERNAL MAMMARY ARTERY, SAPHENOUS VEIN GRAFT, ON PUMP, STERNAL PLATING, 5 LEVEL BILATERAL INTERCOSTAL NERVE BLOCK, PLATELET GEL APPLICATION performed by Alka Toussaint MD at [address]  03/16/2011 cystoscopy left ureteroscopy, left retrograde, double J stent placement    PAIN MANAGEMENT PROCEDURE Right 2019    RIGHT LUMBAR FIVE SACRAL ONE TRANSFORAMINAL EPIDURAL STEROID INJECTION SITE CONFIRMED BY FLUOROSCOPY performed by Les Gill MD at [facility] Right 2020    RIGHT LUMBAR FOUR AND LUMBAR FIVE TRANSFORAMINAL EPIDURAL STEROID INJECTION SITE CONFIRMED BY FLUOROSCOPY performed by Les Gill MD at [place] [address]       Social History:    Social History     Tobacco Use    Smoking status: Former Smoker     Packs/day: 2.00     Years: 35.00     Pack years: 70.00     Types: Cigarettes     Quit date: 3/1/2020     Years since quittin.9    Smokeless tobacco: Never Used   Substance Use Topics    Alcohol use: No                                Counseling given: Not Answered      Vital Signs (Current):   Vitals:    21 1511 21 1209   BP:  129/74   Pulse:  97   Resp:  20   Temp:  96.7 °F (35.9 °C)   TempSrc:  Temporal   SpO2:  95%   Weight: 283 lb (128.4 kg) 283 lb (128.4 kg)   Height: 5' 4\" (1.626 m) 5' 4\" (1.626 m)                                              BP Readings from Last 3 Encounters:   21 129/74   21 118/70   21 (!) 157/85       NPO Status: Time of last liquid consumption:                         Time of last solid consumption:                         Date of last liquid consumption: 21                        Date of last solid food consumption: 21    BMI:   Wt Readings from Last 3 Encounters:   21 283 lb (128.4 kg)   21 293 lb (132.9 kg)   21 283 lb (128.4 kg)     Body mass index is 48.58 kg/m².     CBC:   Lab Results   Component Value Date    WBC 11.4 2021    RBC 5.56 2021    HGB 15.3 2021    HCT 47.6 2021    MCV 85.6 2021    RDW 15.8 2021     2021       CMP:   Lab Results   Component Value Date     2021    K 4.1 02/01/2021    K 4.5 09/19/2020     02/01/2021    CO2 32 02/01/2021    BUN 14 02/01/2021    CREATININE 0.8 02/01/2021    GFRAA >60 02/01/2021    GFRAA >60 03/16/2011    AGRATIO 1.3 09/19/2020    LABGLOM >60 02/01/2021    GLUCOSE 123 02/01/2021    PROT 6.5 09/25/2020    PROT 6.9 03/16/2011    CALCIUM 8.9 02/01/2021    BILITOT 1.0 09/25/2020    ALKPHOS 66 09/25/2020    AST 22 09/25/2020    ALT 55 09/25/2020       POC Tests: No results for input(s): POCGLU, POCNA, POCK, POCCL, POCBUN, POCHEMO, POCHCT in the last 72 hours.     Coags:   Lab Results   Component Value Date    PROTIME 12.7 10/19/2020    INR 1.10 10/19/2020    APTT 28.3 10/19/2020       HCG (If Applicable): No results found for: PREGTESTUR, PREGSERUM, HCG, HCGQUANT     ABGs:   Lab Results   Component Value Date    PHART 7.363 09/25/2020    PO2ART 146.4 09/25/2020    JES3NUF 41.8 09/25/2020    HUT7AKN 23.8 09/25/2020    BEART -2 09/25/2020    W3BSSZGZ 99 09/25/2020        Type & Screen (If Applicable):  No results found for: LABABO, LABRH    Drug/Infectious Status (If Applicable):  No results found for: HIV, HEPCAB    COVID-19 Screening (If Applicable):   Lab Results   Component Value Date    COVID19 Not Detected 01/30/2021    COVID19 NOT DETECTED 09/19/2020         Anesthesia Evaluation  Patient summary reviewed no history of anesthetic complications:   Airway: Mallampati: II  TM distance: >3 FB   Neck ROM: full  Mouth opening: > = 3 FB Dental: normal exam   (+) edentulous      Pulmonary:Negative Pulmonary ROS and normal exam  breath sounds clear to auscultation  (+) COPD:  shortness of breath:  sleep apnea:                             Cardiovascular:Negative CV ROS  Exercise tolerance: good (>4 METS),   (+) CAD:, CABG/stent: no interval change, CHF:,         Rhythm: regular  Rate: normal                    Neuro/Psych:   Negative Neuro/Psych ROS  (+) psychiatric history:            GI/Hepatic/Renal: Neg GI/Hepatic/Renal ROS            Endo/Other: are seen. Compared to previous study from 8- no changes noted in left   ventricular function. Anesthesia Plan      general     ASA 3     (I discussed with the patient the risks and benefits of PIV, anesthesia, IV Narcotics, PACU. All questions were answered the patient agrees with the plan and wishes to proceed)  Induction: intravenous.                           Светлана Suazo MD   2/2/2021

## 2021-02-03 NOTE — ANESTHESIA POSTPROCEDURE EVALUATION
Department of Anesthesiology  Postprocedure Note    Patient: Hipolito Ford  MRN: [id number]  YOB: 1960  Date of evaluation: 2/3/2021  Time:  7:40 AM     Procedure Summary     Date: 02/02/21 Room / Location: [address]    Anesthesia Start: 1343 Anesthesia Stop: 0153    Procedure: RIGHT MIDDLE FINGER IRRIGATION AND DEBRIDEMENT WITH REVISION AMPUTATION AND BONE SHORTENING, POSSIBLE NAIL ABLATION (Right Fingers) Diagnosis:       Partial traumatic amputation of finger through phalanx, initial encounter      (RIGHT MIDDLE FINGER TRAUMATIC AMPUTATION)    Surgeons: Arya Sherman MD Responsible Provider: Zainab Lynch MD    Anesthesia Type: general ASA Status: 3          Anesthesia Type: general    Adele Phase I: Adele Score: 10    Adele Phase II: Adele Score: 10    Last vitals: Reviewed and per EMR flowsheets.        Anesthesia Post Evaluation    Comments: Postoperative Anesthesia Note    Name:    Hipolito Ford  MRN:      [id number]    Patient Vitals in the past 12 hrs:     LABS:    CBC  Lab Results       Component                Value               Date/Time                  WBC                      11.4 (H)            02/01/2021 04:00 PM        HGB                      15.3                02/01/2021 04:00 PM        HCT                      47.6                02/01/2021 04:00 PM        PLT                      235                 02/01/2021 04:00 PM   RENAL  Lab Results       Component                Value               Date/Time                  NA                       140                 02/01/2021 04:00 PM        K                        4.1                 02/01/2021 04:00 PM        K                        4.5                 09/19/2020 05:14 AM        CL                       101                 02/01/2021 04:00 PM        CO2                      32                  02/01/2021 04:00 PM        BUN                      14                  02/01/2021 04:00 PM CREATININE               0.8 (L)             02/01/2021 04:00 PM        GLUCOSE                  123 (H)             02/01/2021 04:00 PM   COAGS  Lab Results       Component                Value               Date/Time                  PROTIME                  12.7                10/19/2020 02:40 PM        INR                      1. 10                10/19/2020 02:40 PM        APTT                     28.3                10/19/2020 02:40 PM     Intake & Output:  @59NJMD@    Nausea & Vomiting:  No    Level of Consciousness:  Awake    Pain Assessment:  Adequate analgesia    Anesthesia Complications:  No apparent anesthetic complications    SUMMARY      Vital signs stable  OK to discharge from Stage I post anesthesia care.   Care transferred from Anesthesiology department on discharge from perioperative area

## 2021-02-04 ENCOUNTER — TELEPHONE (OUTPATIENT)
Dept: FAMILY MEDICINE CLINIC | Age: 61
End: 2021-02-04

## 2021-02-04 NOTE — TELEPHONE ENCOUNTER
Kisha [facility] Transitions Initial Follow Up Call    Outreach made within 2 business days of discharge: Yes    Patient: Kwasi Purcell Patient : 1960   MRN: [id number]  Reason for Admission: There are no discharge diagnoses documented for the most recent discharge. Discharge Date: 21       Spoke with: Left voicemail for patient to call office with questions, concerns and to schedule HFU.     Discharge department/facility: [facility]      Scheduled appointment with PCP within 7-14 days    Follow Up  Future Appointments   Date Time Provider Riri Jara   2021  2:15 PM Zacarias Grover MD  ORTHO [facility]   3/1/2021  9:20 AM Qian Bonds APRN - CNP [facility]   3/3/2021 10:00 AM Sg Bullock MD P CLER CAR [facility]       Tabitha Cartagena, [address] [place] [address] Vinay

## 2021-02-08 ENCOUNTER — OFFICE VISIT (OUTPATIENT)
Dept: ORTHOPEDIC SURGERY | Age: 61
End: 2021-02-08

## 2021-02-08 VITALS — BODY MASS INDEX: 48.32 KG/M2 | HEIGHT: 64 IN | WEIGHT: 283 LBS | RESPIRATION RATE: 16 BRPM | TEMPERATURE: 97.7 F

## 2021-02-08 DIAGNOSIS — S68.119A AMPUTATION OF TIP OF FINGER, INITIAL ENCOUNTER: Primary | ICD-10-CM

## 2021-02-08 PROCEDURE — 99024 POSTOP FOLLOW-UP VISIT: CPT | Performed by: ORTHOPAEDIC SURGERY

## 2021-02-08 NOTE — PATIENT INSTRUCTIONS
Postoperative Instructions After Amputation Revision      Dr. Valiant Schilder. Angelo        1. After bandages are removed one week from surgery, you may chose to wear a small bandage over the incision if you wish, though you do not need to. 2. Keep incision dry until sutures have fully dissolved  or it has been 14 days since your surgery. Thereafter, you may wash with mild soap and water and shower normally. 3. Once your stiches have fully disappeared & skin appears normal, you should begin gently massaging the incision with Vitamin E (may use Vitamin E lotion or contents of Vitamin E capsule). 4. Work hard on motion of the fingers and wrist, straightening each finger fully and bending each finger fully, bending wrist forward and bending wrist backwards. Do not be concerned if you experience discomfort. This will not damage the surgery. 5. You may begin using the hand as it feels comfortable beginning 12-14 days from the day of surgery. You may not feel entirely comfortable gripping or lifting heavy objects for several weeks. 6. You may expect to see some skin peel off around the incision. You may be left with a small area of pink baby skin. This is quite normal.    Patient instructions desensitization    Hand injuries are often very tender during the early healing phase. Often, tenderness in scars gets worse starting one to two weeks after injury or surgery. Unfortunately, this tenderness does not always go away by itself. The nerves in the hand are special and are more sensitive than other parts of the body. After any injury, the skin of the hand must get used to being touched again for the tenderness to go away. If you do not touch the sore areas of your hand, they may remain very sensitive and tender. The techniques of PERCUSSION and FRICTION MASSAGE outlined in this pamphlet will help speed up the process of recovery from tenderness in your hands and fingers.     The goal of these exercises is to make your wounds less tender. It is normal for these exercises to be somewhat uncomfortable while doing them or shortly afterwards. If the exercises are too painful, try using less pressure. If that does not work, then give yourself a several hour break and try again. If pain again is a problem, speak with your doctor or your therapist. These exercises will not be recommended until it is safe to do them. PERCUSSION (Tapping): This technique activates the automatic reflex which makes us ignore things which are very repetitive. This reflex will dull the tenderness in areas of your hand that are touched repeatedly. Here is how to do percussion:    1. Tap lightly on the area of your hand which is tender. You can tap on the sensitive area with a finger tip of your other hand or with a light object such as a pencil. 2. Find the spot which is the most tender. 3. Note the time, and begin to tap rapidly (2-3 times a second), lightly and continuously on the most tender area. .    4. Keep tapping without a break for three minutes or until you notice the feeling in the area change. The area may start to feel numb or it may simply feel a little bit less tender. 5. Take a minute rest and begin again. You may find that a different area is now the most tender spot. This exercise should be done as many times as possible during the day. It takes many thousands of taps to really change the tenderness in a sore area. The sooner you accumulate that many taps, the sooner your wounds will be more comfortable. FRICTION MASSAGE:    The goal of friction massage is to [facility] the scar tissue beneath the skin. As with percussion, it should be done many times during the day. This exercise not only helps improve tenderness, but helps restore the contour of the skin to a more normal appearance. Here is how to do friction massage:    1. Place a finger tip of your other hand against the central area of the scar.     2. Mentally note

## 2021-02-08 NOTE — Clinical Note
Dear  Jewels Noble, APRN - CNP,    Thank you very much for your referral or Mr. Villarreal Prudent to me for evaluation and treatment of his Hand & Wrist condition. I appreciate your confidence in me and thank you for allowing me the opportunity to care for your patients. If I can be of any further assistance to you on this or any other patient, please do not hesitate to contact me. Sincerely,    Dvae Baird.  Karla Andre MD

## 2021-02-08 NOTE — PROGRESS NOTES
Mr. Nick Squires returns today in follow-up of his recent right Middle Finger tip injury repair done approximately 1 week ago. He required Irrigation and Debridement with bone shortening and revision amputation of Right Middle Finger at the Distal Phalanx tuft. He has done well post-operatively and has noted absent discomfort and decreased swelling. No symptoms of infection. He notes no symptoms of numbness, tingling, no symptoms related to perfusion. Physical Exam:  Skin is healing well, without erythema, drainage or sign of infection, nontender. Digital range of motion is Full and equal bilaterally. Wrist range of motion is Full and equal bilaterally. Sensation is normal in the middle finger and whole right hand. Vascular examination reveals normal, good capillary refill, good color and warm. Swelling is minimal in the injured digit(s). There is no surrounding erythema, redness & induration. Radiographic Evaluation:  Radiographs were not obtained today. Impression:  Mr. Nick Squires is doing well after recent  right Middle Finger tip repair. Plan:  Nick Squires is instructed in work on Active & Passive range of motion of the digits, wrist, & elbow. These modalities were specifically demonstrated to him today and he return demonstrated proper understanding. We discussed the appropriateness of gradual resumption of use of the operated hand and the return to normal use as comfort allows. He is given instructions regarding management of the fresh surgical incision and progressive use of desensitization and tissue massage techniques. We discussed the appropriate expectations and timeline for symptom improvement. We discussed the option of pursuing formalized hand therapy and a prescription  was not indicated.     I have asked Mr. Nick Squires to follow-up with me 4 weeks from now or to call me at that time if he has not been able to regain full painless range of motion and functional use of the injured extremity. He is also specifically instructed to return to the office or call for an appointment sooner if his symptoms are changing or worsening prior to that time.

## 2021-03-01 ENCOUNTER — OFFICE VISIT (OUTPATIENT)
Dept: FAMILY MEDICINE CLINIC | Age: 61
End: 2021-03-01
Payer: COMMERCIAL

## 2021-03-01 VITALS
WEIGHT: 298 LBS | HEART RATE: 102 BPM | TEMPERATURE: 97.8 F | DIASTOLIC BLOOD PRESSURE: 60 MMHG | HEIGHT: 65 IN | BODY MASS INDEX: 49.65 KG/M2 | SYSTOLIC BLOOD PRESSURE: 112 MMHG | OXYGEN SATURATION: 92 %

## 2021-03-01 DIAGNOSIS — Z23 FLU VACCINE NEED: ICD-10-CM

## 2021-03-01 DIAGNOSIS — F51.01 PRIMARY INSOMNIA: ICD-10-CM

## 2021-03-01 DIAGNOSIS — F41.9 ANXIETY: ICD-10-CM

## 2021-03-01 DIAGNOSIS — J44.9 CHRONIC OBSTRUCTIVE PULMONARY DISEASE, UNSPECIFIED COPD TYPE (HCC): Primary | ICD-10-CM

## 2021-03-01 DIAGNOSIS — E78.2 MIXED HYPERLIPIDEMIA: ICD-10-CM

## 2021-03-01 DIAGNOSIS — Z13.31 POSITIVE DEPRESSION SCREENING: ICD-10-CM

## 2021-03-01 DIAGNOSIS — F32.9 REACTIVE DEPRESSION: ICD-10-CM

## 2021-03-01 PROCEDURE — G8431 POS CLIN DEPRES SCRN F/U DOC: HCPCS | Performed by: NURSE PRACTITIONER

## 2021-03-01 PROCEDURE — 99215 OFFICE O/P EST HI 40 MIN: CPT | Performed by: NURSE PRACTITIONER

## 2021-03-01 PROCEDURE — 90471 IMMUNIZATION ADMIN: CPT | Performed by: NURSE PRACTITIONER

## 2021-03-01 PROCEDURE — 90686 IIV4 VACC NO PRSV 0.5 ML IM: CPT | Performed by: NURSE PRACTITIONER

## 2021-03-01 RX ORDER — TRAZODONE HYDROCHLORIDE 100 MG/1
100 TABLET ORAL NIGHTLY
Qty: 30 TABLET | Refills: 5 | Status: SHIPPED | OUTPATIENT
Start: 2021-03-01 | End: 2021-11-15

## 2021-03-01 RX ORDER — SERTRALINE HYDROCHLORIDE 100 MG/1
100 TABLET, FILM COATED ORAL DAILY
Qty: 30 TABLET | Refills: 3 | Status: SHIPPED | OUTPATIENT
Start: 2021-03-01 | End: 2021-07-22

## 2021-03-01 ASSESSMENT — PATIENT HEALTH QUESTIONNAIRE - PHQ9
4. FEELING TIRED OR HAVING LITTLE ENERGY: 3
3. TROUBLE FALLING OR STAYING ASLEEP: 3
SUM OF ALL RESPONSES TO PHQ QUESTIONS 1-9: 15
7. TROUBLE CONCENTRATING ON THINGS, SUCH AS READING THE NEWSPAPER OR WATCHING TELEVISION: 3
SUM OF ALL RESPONSES TO PHQ QUESTIONS 1-9: 15
9. THOUGHTS THAT YOU WOULD BE BETTER OFF DEAD, OR OF HURTING YOURSELF: 0
5. POOR APPETITE OR OVEREATING: 0
SUM OF ALL RESPONSES TO PHQ9 QUESTIONS 1 & 2: 6
6. FEELING BAD ABOUT YOURSELF - OR THAT YOU ARE A FAILURE OR HAVE LET YOURSELF OR YOUR FAMILY DOWN: 0

## 2021-03-01 NOTE — PROGRESS NOTES
[address] [facility]  [address] [facility] [address]  Dept: [phone]  Dept Fax: [phone]  Loc: [phone]    Sasha Paul is a 64 y.o. male who presents today for his medical conditions/complaints as noted below. Sasha Paul is c/o of Depression (pt said his depression is about the same but his anxiety is through the roof ), COPD (pt said he feels like his breathing is getting worse ), and Insomnia (pt said the medication is not helping all that much )       Subjective:     Chief Complaint   Patient presents with    Depression     pt said his depression is about the same but his anxiety is through the roof     COPD     pt said he feels like his breathing is getting worse     Insomnia     pt said the medication is not helping all that much        HPI  COPD:  Current treatment includes short-acting beta agonist inhaler, nebulized beta agonist.  Residual symptoms: chronic dyspnea, inability to climb stairs and non-productive cough. He denies purulent sputum, chest pain, fever, nasal congestion, clear nasal discharge, sneezing, purulent nasal discharge, sinus pressure, sore throat, weight loss. He requires his rescue inhaler 4 time(s) per day. Now sees pulmonology--Dr hamilton  9/30/2020 PFT result    Spirometry shows FVC to be 58%, FEV1 to be 66%, FEV1 to  FVC ratio was 98%, DHU08-90% was 62%. The patient did not have any  significant postbronchodilator improvement on the study. Lung volume  shows the total lung capacity was normal.  The patient has some air  trapping. The patient has decrease in ERV which may be because of body  habitus. The patient's diffusion capacity when adjusted flow volume was  normal.  The patient's flow volume loop was normal.  On the basis of  this PFT, the patient has mild-to-moderate obstructive airway disease  with some changes in the PFT parameters because of body habitus. Please  correlate clinically.     Insomnia: Current treatment: avoiding heavy meal before bedtime, avoiding long naps during the day, avoiding use of electronic devices before bedtime, avoiding vigorous physical exercise prior to bed, decreasing caffeine consumption, going to sleep at the same time each night and prescription sleep aid- trazodone- 50 mg nightly, which has been not very effective. Medication side effects: none. Hyperlipidemia:    + CAD---sees cardiology. S/p CABG x3 vessel  On plavix and asa  No new myalgias or GI upset on atorvastatin (Lipitor). Medication compliance: compliant all of the time. Patient is not following a low fat, low cholesterol diet. He is not exercising regularly. Lab Results   Component Value Date    CHOL 85 09/25/2020    TRIG 173 (H) 09/25/2020    HDL 35 (L) 01/13/2021    LDLCALC 33 01/13/2021     Lab Results   Component Value Date    ALT 55 (H) 09/25/2020    AST 22 09/25/2020        Mood Disorder:  Patient presents for follow-up of depression and anxiety disorder. Current complaints include: See PHQ9 below . He denies tearfulness, decreased libido, irritability, excessive worry, panic attacks, obsessive thoughts, compulsive behaviors, increased use of drugs or alcohol, suicidal thoughts or behavior, and impaired memory. Symptoms/signs of lev: none. External stressors: nothing new. Current treatment includes: Effexor- xr 150 mg daily. Medication side effects: none.     PHQ-9  3/1/2021 6/26/2020 1/6/2020 9/25/2019 9/11/2019   Little interest or pleasure in doing things 3 3 3 3 3   Feeling down, depressed, or hopeless 3 1 2 2 2   Trouble falling or staying asleep, or sleeping too much 3 3 3 2 3   Feeling tired or having little energy 3 3 2 3 3   Poor appetite or overeating 0 0 3 2 3   Feeling bad about yourself - or that you are a failure or have let yourself or your family down 0 0 0 1 1   Trouble concentrating on things, such as reading the newspaper or watching television 3 0 2 1 1   Moving or speaking Negative for abdominal pain, anal bleeding, blood in stool and nausea. Endocrine: Negative. Genitourinary: Negative. Negative for hematuria. Musculoskeletal: Negative. Skin: Negative. Negative for rash. Allergic/Immunologic: Negative. Neurological: Negative. Negative for dizziness, syncope, light-headedness and numbness. Hematological: Negative. Does not bruise/bleed easily. Psychiatric/Behavioral: Positive for decreased concentration, dysphoric mood and sleep disturbance. Negative for behavioral problems, confusion, hallucinations, self-injury and suicidal ideas. The patient is nervous/anxious. The patient is not hyperactive. All other systems reviewed and are negative.        Past Medical History:   Diagnosis Date    Anxiety 1/6/2020    Aortic valve calcification 09/2020    seen on lung CT    Chronic obstructive pulmonary disease ([facility] [address] 9/3/2019    PFT done 9/03/19    Coronary artery calcification 09/2020    seen on lung ct    Crush injury of right foot 7/23/2015    DDD (degenerative disc disease), lumbar 1/6/2020    Erectile dysfunction 1/6/2020    Fatigue 1/6/2020    History of alcohol abuse 1/6/2020    History of drug use     HNP (herniated nucleus pulposus), lumbar 1/6/2020    Hyperglycemia 1/6/2020    Hypersomnia 1/6/2020    Kidney stone     Lumbar radiculopathy 1/6/2020    Lumbar spine pain 1/6/2020    LVH (left ventricular hypertrophy)     seen on echo 8/2020, moderate    Observed sleep apnea 1/6/2020    Reactive depression 1/6/2020    Spondylosis without myelopathy or radiculopathy, lumbar region 1/6/2020    Tobacco use 1/6/2020     Family History   Problem Relation Age of Onset    Heart Disease Mother     Other Mother         copd    Liver Disease Father     High Blood Pressure Sister     No Known Problems Sister      Past Surgical History:   Procedure Laterality Date    CHOLECYSTECTOMY      CORONARY ARTERY BYPASS GRAFT N/A 9/25/2020    CORONARY ARTERY BYPASS GRAFTING X3, INTERNAL MAMMARY ARTERY, SAPHENOUS VEIN GRAFT, ON PUMP, STERNAL PLATING, 5 LEVEL BILATERAL INTERCOSTAL NERVE BLOCK, PLATELET GEL APPLICATION performed by Lubna Tellez MD at [address] [facility]  2011    cystoscopy left ureteroscopy, left retrograde, double J stent placement    FINGER AMPUTATION Right 2021    RIGHT MIDDLE FINGER IRRIGATION AND DEBRIDEMENT WITH REVISION AMPUTATION AND BONE SHORTENING, POSSIBLE NAIL ABLATION performed by Nadiya Ye MD at [address] [facility] Right 2019    RIGHT LUMBAR FIVE SACRAL ONE TRANSFORAMINAL EPIDURAL STEROID INJECTION SITE CONFIRMED BY FLUOROSCOPY performed by Sushil Ag MD at [address] [facility] Right 2020    RIGHT LUMBAR FOUR AND LUMBAR FIVE TRANSFORAMINAL EPIDURAL STEROID INJECTION SITE CONFIRMED BY FLUOROSCOPY performed by Sushil Ag MD at [address] History     Socioeconomic History    Marital status:      Spouse name: Not on file    Number of children: Not on file    Years of education: Not on file    Highest education level: Not on file   Occupational History    Not on file   Social Needs    Financial resource strain: Not on file    Food insecurity     Worry: Not on file     Inability: Not on file    Transportation needs     Medical: Not on file     Non-medical: Not on file   Tobacco Use    Smoking status: Former Smoker     Packs/day: 2.00     Years: 35.00     Pack years: 70.00     Types: Cigarettes     Quit date: 3/1/2020     Years since quittin.0    Smokeless tobacco: Never Used   Substance and Sexual Activity    Alcohol use: No    Drug use: Not Currently     Comment: hx of drug use    Sexual activity: Not on file   Lifestyle    Physical activity     Days per week: Not on file     Minutes per session: Not on file    Stress: Not on file   Relationships    Social connections     Talks on phone: Not on file     Gets together: Not on file     Attends Yarsani service: Not on file     Active member of club or organization: Not on file     Attends meetings of clubs or organizations: Not on file     Relationship status: Not on file    Intimate partner violence     Fear of current or ex partner: Not on file     Emotionally abused: Not on file     Physically abused: Not on file     Forced sexual activity: Not on file   Other Topics Concern    Not on file   Social History Narrative    Not on file     Current Outpatient Medications   Medication Sig Dispense Refill    acetaminophen (TYLENOL) 500 MG tablet Take 2 tablets by mouth 3 times daily for 10 days 60 tablet 0    clopidogrel (PLAVIX) 75 MG tablet Take 1 tablet by mouth daily 90 tablet 3    lisinopril (PRINIVIL;ZESTRIL) 10 MG tablet Take 1 tablet by mouth daily 60 tablet 5    furosemide (LASIX) 40 MG tablet Take 1 tablet by mouth 2 times daily 180 tablet 3    traZODone (DESYREL) 50 MG tablet Take 1 tablet by mouth nightly 30 tablet 1    metoprolol tartrate (LOPRESSOR) 25 MG tablet Take 1 tablet by mouth 2 times daily Hold this medication for pulse <83 or systolic BP <413 90 tablet 3    venlafaxine (EFFEXOR XR) 150 MG extended release capsule Take 1 capsule by mouth daily 30 capsule 5    ibuprofen (ADVIL;MOTRIN) 800 MG tablet Take 1 tablet by mouth 3 times daily 90 tablet 1    atorvastatin (LIPITOR) 80 MG tablet Take 1 tablet by mouth nightly 90 tablet 3    aspirin 81 MG EC tablet Take 1 tablet by mouth daily 30 tablet 0    albuterol (PROVENTIL) (2.5 MG/3ML) 0.083% nebulizer solution Take 3 mLs by nebulization every 6 hours as needed for Wheezing or Shortness of Breath 25 vial 1    albuterol sulfate HFA (PROVENTIL HFA) 108 (90 Base) MCG/ACT inhaler Inhale 2 puffs into the lungs every 6 hours as needed for Wheezing 1 Inhaler 0     No current facility-administered medications for this visit.          No changes in past medical history, past surgical history, social history, orfamily history were noted during the patient encounter unless specifically listed above. All updates of past medical history, past surgical history, social history, or family history were reviewed personally by me duringthe office visit. All problems listed in the assessment are stable unless noted otherwise. Medication profile reviewed personally by me during the office visit. Medication side effects and possible impairments frommedications were discussed as applicable. Objective:     Physical Exam  Vitals signs and nursing note reviewed. Constitutional:       General: He is not in acute distress. Appearance: Normal appearance. He is well-developed. HENT:      Head: Normocephalic and atraumatic. Right Ear: Tympanic membrane, ear canal and external ear normal.      Left Ear: Tympanic membrane, ear canal and external ear normal.      Nose: Nose normal.   Eyes:      General: Lids are normal.      Conjunctiva/sclera: Conjunctivae normal.      Pupils: Pupils are equal, round, and reactive to light. Neck:      Musculoskeletal: Normal range of motion and neck supple. Thyroid: No thyromegaly. Vascular: No carotid bruit or JVD. Cardiovascular:      Rate and Rhythm: Normal rate and regular rhythm. Pulses: Normal pulses. Radial pulses are 2+ on the right side and 2+ on the left side. Dorsalis pedis pulses are 2+ on the right side and 2+ on the left side. Posterior tibial pulses are 2+ on the right side and 2+ on the left side. Heart sounds: Normal heart sounds. No murmur. No friction rub. No gallop. Pulmonary:      Effort: Pulmonary effort is normal.      Breath sounds: Decreased breath sounds present. No wheezing, rhonchi or rales. Abdominal:      General: Bowel sounds are normal.      Palpations: Abdomen is soft. There is no mass. Tenderness: There is no abdominal tenderness. Musculoskeletal: Normal range of motion. Lymphadenopathy:      Head:      Right side of head: No submandibular adenopathy. Left side of head: No submandibular adenopathy. Cervical: No cervical adenopathy. Skin:     General: Skin is warm and dry. Findings: No lesion or rash. Neurological:      Mental Status: He is alert and oriented to person, place, and time. Gait: Gait normal.   Psychiatric:         Speech: Speech normal.         Behavior: Behavior normal.         Thought Content: Thought content normal.         Judgment: Judgment normal.         /60 (Site: Left Upper Arm, Position: Sitting, Cuff Size: Large Adult)   Pulse 102   Temp 97.8 °F (36.6 °C) (Temporal)   Ht 5' 5\" (1.651 m)   Wt 298 lb (135.2 kg)   SpO2 92%   BMI 49.59 kg/m²   Body mass index is 49.59 kg/m².     BP Readings from Last 2 Encounters:   03/01/21 112/60   02/02/21 (!) 113/59       Wt Readings from Last 3 Encounters:   03/01/21 298 lb (135.2 kg)   02/08/21 283 lb (128.4 kg)   02/02/21 283 lb (128.4 kg)       Lab Review   Hospital Outpatient Visit on 02/01/2021   Component Date Value    WBC 02/01/2021 11.4*    RBC 02/01/2021 5.56     Hemoglobin 02/01/2021 15.3     Hematocrit 02/01/2021 47.6     MCV 02/01/2021 85.6     MCH 02/01/2021 27.6     MCHC 02/01/2021 32.2     RDW 02/01/2021 15.8*    Platelets 52/19/4662 235     MPV 02/01/2021 7.9     Neutrophils % 02/01/2021 65.2     Lymphocytes % 02/01/2021 22.2     Monocytes % 02/01/2021 8.0     Eosinophils % 02/01/2021 4.2     Basophils % 02/01/2021 0.4     Neutrophils Absolute 02/01/2021 7.4     Lymphocytes Absolute 02/01/2021 2.5     Monocytes Absolute 02/01/2021 0.9     Eosinophils Absolute 02/01/2021 0.5     Basophils Absolute 02/01/2021 0.0     Sodium 02/01/2021 140     Potassium 02/01/2021 4.1     Chloride 02/01/2021 101     CO2 02/01/2021 32     Anion Gap 02/01/2021 7     Glucose 02/01/2021 123*    BUN 02/01/2021 14     CREATININE 02/01/2021 0.8*    GFR Non- American 02/01/2021 >60     GFR  02/01/2021 >60     Calcium 02/01/2021 8.9    Hospital Outpatient Visit on 01/30/2021   Component Date Value    SARS-CoV-2 01/30/2021 Not Detected    Hospital Outpatient Visit on 01/13/2021   Component Date Value    Cholesterol, Fasting 01/13/2021 94     Triglyceride, Fasting 01/13/2021 131     HDL 01/13/2021 35*    LDL Calculated 01/13/2021 33     VLDL Cholesterol Calcula* 01/13/2021 26     Pro-BNP 01/13/2021 147*    Sodium 01/13/2021 141     Potassium 01/13/2021 4.4     Chloride 01/13/2021 103     CO2 01/13/2021 28     Anion Gap 01/13/2021 10     Glucose 01/13/2021 104*    BUN 01/13/2021 17     CREATININE 01/13/2021 0.9     GFR Non- 01/13/2021 >60     GFR  01/13/2021 >60     Calcium 01/13/2021 9.1        No results found for this visit on 03/01/21. Assessment:       1. Chronic obstructive pulmonary disease, unspecified COPD type ([address]    2. Mixed hyperlipidemia    3. Primary insomnia    4. Reactive depression    5. Anxiety    6. Flu vaccine need    7. Positive depression screening        No results found for this visit on 03/01/21. Plan:       Salena Shah was seen today for depression, copd and insomnia. Diagnoses and all orders for this visit:    Chronic obstructive pulmonary disease, unspecified COPD type ([address]  -     Handicap Placard MISC; by Does not apply route Duration - 5 years  Continue albuterol  Can not afford other inhalers (multiple have been tried)  Start   -     ipratropium (ATROVENT) 0.02 % nebulizer solution; Take 2.5 mLs by nebulization 3 times daily  Continue with pulmo    Mixed hyperlipidemia  The patient is asked to make an attempt to improve diet and exercise patterns to aid in medical management of this problem. Primary insomnia--not controlled  increase  -     traZODone (DESYREL) 100 MG tablet;  Take 1 tablet by mouth nightly  Patient counselled on proper usage of medication, and was advised of potential side effects and risks associated with this medication. The patient expressed understanding of above and agreed to proceed with treatment. Reactive depression  Discontinue effexor d/t ineffective  increase  -     traZODone (DESYREL) 100 MG tablet; Take 1 tablet by mouth nightly  Start   -     sertraline (ZOLOFT) 100 MG tablet; Take 1 tablet by mouth daily  Educated if patient develops SI/HI/lev to call 911 or go to ER. Discussed use, benefit, risks and side effects of prescribed medications. Barriers to compliance discussed. All patient questions answered. Pt voiced understanding. Anxiety  Discontinue effexor d/t ineffective  Start   -     sertraline (ZOLOFT) 100 MG tablet; Take 1 tablet by mouth daily    Flu vaccine need  -     INFLUENZA, QUADV, 3 YRS AND OLDER, IM PF, PREFILL SYR OR SDV, 0.5ML (AFLURIA QUADV, PF)    Positive depression screening  -     Positive Screen for Clinical Depression with a Documented Follow-up Plan   On the basis of positive PHQ-9 screening (PHQ-9 Total Score: 15), the following plan was implemented: medication prescribed - patient will call for any significant medication side effects or worsening symptoms of depression. Patient will follow-up in 6 week(s) with PCP via telephone. Patient has been instructed call the office immediately with new symptoms, change in symptoms or worseningof symptoms. If this is not feasible, patient is instructed to report to the emergency room. Medication profile reviewed. Medication side effects and possible impairments from medications were discussed as applicable. Allergies were reviewed. Health maintenance was reviewed and updated as appropriate. Return in about 3 months (around 6/1/2021) for [facility], 6 week telephone visit for depression.     (Comment: Please note this report has been produced using a combination of typing and speech recognition software and may contain errors related to that system including errors in grammar, punctuation, and spelling, as well as words and phrases that may be inappropriate.  If there are any questions or concerns please feel free to contact the dictating provider for clarification.)

## 2021-03-01 NOTE — PROGRESS NOTES
CARDIOLOGY FOLLOW-UP VISIT        Patient Name: Lou Brown  Primary Care physician: Samira Pierre, ANICETO - CNP  Reason for Referral/Chief complaint: Congestive heart failure, Coronary artery disease    Subjective:     Lou Brown is a pleasant 64 y.o. man with prior history notable for congestive heart failure with ischemic cardiomyopathy, coronary artery disease status post coronary artery bypass, previous smoker, and obstructive sleep apnea noncompliant with CPAP. I first evaluated the patient at [facility] 9/25/20 at which time he complained of chest pain. During admission he was found to have an abnormal stress. He then began having rest pain consistent with unstable angina. He was inititated on acute coronary syndrome therapies and transferred to [facility] for catheterization which revealed multi-vessel coronary artery disease. Subsequently, he underwent three-vessel bypass with Dr. Karthik Ca, receiving a LIMA to LAD, SVG to OM and SVG to diagonal. Limited ECHO 9/29/20 confirmed Normal left ventricular systolic function with ejection fraction of 55-60%. Last visit 01/13/21 the patient endorsed continued dyspnea with exertion and paroxysmal nocturnal dyspnea. Noted his BP's were continuing to run high at home. The patient was started on lisinopril 10 mg daily. He was told to continue 40 mg lasix twice daily as did not appear grossly volume overloaded by exam.    Today, he returns and continues to report worsening dyspnea with exertion. Reports having to stop 1/2 way going to mailbox. He was walking a couple miles daily right after surgery when he felt well. He reports orthopnea and PND. His weight is up 8 lbs in past week. He feels fatigued and endorses sleeping 16 hrs per day. He has no stamina to complete daily chores. He eats at home denies excessive calories no fast food - watching salt intake. He does drink excessive amount of water - >64 oz per day.  On further questioning today, notes he's been taking lasix all together in AM, 80 mg total. Notes brisk urination with this dose. Due to his pulmonary complaints he recently had ipratropium added to nebulizer therapies on Monday. He has not noticed a difference as yet. Denies chest pain/pressure, dizziness, pre-syncope, palpitations or nancy syncope. 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 The patient is compliant with medications. Cost of medications is affordable. No endorsed side effects. Quit smoking previously. Home Medications:  Were reviewed and are listed in nursing record and/or below  Prior to Admission medications    Medication Sig Start Date End Date Taking?  Authorizing Provider   ipratropium (ATROVENT) 0.02 % nebulizer solution Take 2.5 mLs by nebulization 3 times daily 3/2/21  Yes Candance Solid, APRN - CNP   Handicap Placard MISC by Does not apply route Duration - 5 years 3/1/21  Yes Candance Solid, APRN - CNP   traZODone (DESYREL) 100 MG tablet Take 1 tablet by mouth nightly 3/1/21  Yes Candance Solid, APRN - CNP   sertraline (ZOLOFT) 100 MG tablet Take 1 tablet by mouth daily 3/1/21  Yes Candance Solid, APRN - CNP   clopidogrel (PLAVIX) 75 MG tablet Take 1 tablet by mouth daily 1/13/21  Yes Jamal Buck MD   lisinopril (PRINIVIL;ZESTRIL) 10 MG tablet Take 1 tablet by mouth daily 1/13/21  Yes Jamal Buck MD   furosemide (LASIX) 40 MG tablet Take 1 tablet by mouth 2 times daily 1/13/21 1/13/22 Yes Jamal Buck MD   metoprolol tartrate (LOPRESSOR) 25 MG tablet Take 1 tablet by mouth 2 times daily Hold this medication for pulse <77 or systolic BP <231 74/98/12  Yes Candance Solid, APRN - CNP   ibuprofen (ADVIL;MOTRIN) 800 MG tablet Take 1 tablet by mouth 3 times daily 10/30/20  Yes Candance Solid, APRN - CNP   atorvastatin (LIPITOR) 80 MG tablet Take 1 tablet by mouth nightly 10/28/20  Yes Karolina Hernandez MD   aspirin 81 MG EC tablet Take 1 tablet by mouth daily 9/30/20  Yes ANICETO Sunshine CNP   albuterol (PROVENTIL) (2.5 MG/3ML) 0.083% nebulizer solution Take 3 mLs by nebulization every 6 hours as needed for Wheezing or Shortness of Breath 9/25/19  Yes ANICETO Eaton CNP   albuterol sulfate HFA (PROVENTIL HFA) 108 (90 Base) MCG/ACT inhaler Inhale 2 puffs into the lungs every 6 hours as needed for Wheezing 5/17/19  Yes Carley Bautista MD   acetaminophen (TYLENOL) 500 MG tablet Take 2 tablets by mouth 3 times daily for 10 days 1/22/21 3/1/21  Kane Silver MD   Reports he is not taking digoxin, potassium, furosemide or statin presently. He is taking Plavix in addition aspirin. CURRENT Medications:  No current facility-administered medications for this visit. Allergies:  Dilaudid [hydromorphone hcl] and Codeine     Review of Systems:   A 14 point review of symptoms completed. Pertinent positives identified in the HPI, all other review of symptoms negative. Objective:     Vitals:    01/13/21 1000   BP: (!) 134/90   Pulse:  90 bpm   Temp:    SpO2:  96%    Weight: 297 lb 9.6 oz (135 kg)       Wt Readings from Last 3 Encounters:   03/03/21 297 lb 9.6 oz (135 kg)   03/01/21 298 lb (135.2 kg)   02/08/21 283 lb (128.4 kg)       PHYSICAL EXAM:    General:  Alert, cooperative, no distress, appears stated age   Head:  Normocephalic, atraumatic   Eyes:  Conjunctiva/corneas clear, anicteric sclerae    Nose: Nares normal, no drainage or sinus tenderness   Throat: No abnormalities of the lips, oral mucosa or tongue. Moist mucous membranes. Neck: Trachea midline.  Neck supple with no lymphadenopathy, thyroid not enlarged, symmetric, no tenderness/mass/nodules, no clear Jugular venous pressure elevation    Lungs:   Clear to auscultation bilaterally, no wheezes, no rales, no respiratory distress   Chest Wall:   Well-healed sternotomy incision. Heart:  Regular rate and rhythm, normal S1, normal S2, no murmur, no rub, no S3/S4, PMI non-palpable. Abdomen:    Obese, soft, non-tender, with normoactive bowel sounds. No masses, no hepatosplenomegaly   Extremities: No cyanosis, clubbing. He has 1+ pitting of the LE bilaterally. Incision for vein harvest right lower extremity well-healed. Vascular: 2+ radial, dorsalis pedis and posterior tibial pulses bilaterally. Brisk carotid upstrokes without carotid bruit. Skin: Skin color, texture, turgor are normal with no rashes or ulceration. Pysch: Euthymic mood, appropriate affect   Neurologic: Oriented to person, place and time. No slurred speech or facial asymmetry. No motor or sensory deficits on gross examination. Labs:   CBC:   Lab Results   Component Value Date    WBC 11.4 02/01/2021    RBC 5.56 02/01/2021    HGB 15.3 02/01/2021    HCT 47.6 02/01/2021    MCV 85.6 02/01/2021    RDW 15.8 02/01/2021     02/01/2021     CMP:  Lab Results   Component Value Date     02/01/2021    K 4.1 02/01/2021    K 4.5 09/19/2020     02/01/2021    CO2 32 02/01/2021    BUN 14 02/01/2021    CREATININE 0.8 02/01/2021    GFRAA >60 02/01/2021    GFRAA >60 03/16/2011    AGRATIO 1.3 09/19/2020    LABGLOM >60 02/01/2021    GLUCOSE 123 02/01/2021    PROT 6.5 09/25/2020    PROT 6.9 03/16/2011    CALCIUM 8.9 02/01/2021    BILITOT 1.0 09/25/2020    ALKPHOS 66 09/25/2020    AST 22 09/25/2020    ALT 55 09/25/2020     PT/INR:  No results found for: PTINR  HgBA1c:  Lab Results   Component Value Date    LABA1C 6.0 09/25/2020     Lab Results   Component Value Date    TROPONINI 0.01 10/19/2020     Fasting lipid profile from 9/25/2020 reviewed. HDL 34, LDL 16, triglycerides 173, total cholesterol 85.     Lab Results   Component Value Date    CHOL 85 09/25/2020    CHOL 142 09/19/2020    CHOL 152 09/11/2020     Lab Results   Component Value Date    TRIG 173 (H) 09/25/2020    TRIG 217 (H) 09/19/2020    TRIG 275 (H) 09/11/2020     Lab Results   Component Value Date    HDL 35 (L) 01/13/2021    HDL 34 (L) 09/25/2020    HDL 33 (L) 09/19/2020     Lab Results   Component Value Date    LDLCALC 33 01/13/2021    LDLCALC 16 09/25/2020    LDLCALC 66 09/19/2020     Lab Results   Component Value Date    LABVLDL 26 01/13/2021    LABVLDL 35 09/25/2020    LABVLDL 43 09/19/2020     No results found for: CHOLHDLRATIO    Interval Testing/Data:   EKG 10/19/20  Normal sinus rhythm Low voltage QRS Nonspecific T wave abnormality    Limited ECHO 9/29/20   Summary   Limited only f/u for LVEF post op CABG   Technically difficult examination. Normal left ventricular systolic function with ejection fraction of 55-60%. No regional wall motion abnormalites are seen. Compared to previous study from 8- no changes noted in left   ventricular function. Judesireee Natter 9/22/20   Summary     Increase left ventricular systolic volume with hypokinesis/decreased     myocardial thickening of the inferolateral segment. Large area of inducible     ischemia of the LAD and left circumflex territories. Post-stress LVEF normal     at 56%. Overall findings represent highly abnormal study, high risk scan. [facility] 9/22/20  1. Left main coronary artery has moderate calcific disease. It gave off the left anterior descending artery and left circumflex. 2. Left anterior descending artery has severe atherosclerotic disease. It was moderate in size. It gave off a large early diagonal branch that has ostial 90%. The LAD covered the entire apex of the left ventricle. 3. Left circumflex has severe atherosclerotic disease. It was moderate in size. There was a moderate sized obtuse marginal branch that is occluded. 4. Right coronary artery has mild atherosclerotic disease. It was large in size and was the dominant artery. ~there is faint collaterals to the LAD     5.  Left ventriculogram showed normal LVEF at 55-60%. Wall motion was normal . There was no significant mitral valve or aortic valve disease noted. LVEDP was normal. There was no gradient noted across the aortic valve during pullback of the catheter. ECHO 8/19/20   Summary   LV systolic function is normal with EF estimated at 55-60%. Endocardium not   entirely well visualized but no obvious segmental wall motion abnormalities. There is moderate concentric left ventricular hypertrophy. Normal left ventricular diastolic filling pressure. Mild mitral annular calcification. Valves are not entirely well visualized but there is no obvious structural   abnormality or significant color flow abnormality noted on doppler. Unable to obtain SPAP due to lack of tricuspid regurgitation. Impression and Plan   Mohinder Mcclure is a 64 y.o. with prior history of congestive heart failure with preserved LV function with admission in August of 2020, obesity, prior 5 pack per day smoker for 15-20 years, chronic obstructive pulmonary disease on nocturnal oxygen, obstructive sleep apnea non-compliant with CPAP. He presented in September 2020 with unstable angina and was transferred to [facility] for cardiac catheterization which disclosed multivessel disease. He subsequently underwent three-vessel bypass with Dr. Kwame Bliss and has recovered well. Returns today with complaints of ongoing dyspnea with exertion and symptoms consistent with congestive heart failure. He has gained 14lbs on serial office weights in 1 month - states 8 lbs this week by his home scale. Continues to drink water excessively. States he's taking low salt diet. His BP remains elevated in office and home BP log ranges higher than that recorded in office today. Improved BP control needed. Will trial increased dosing diuresis. Rest as below:    1. Acute on chronic congestive heart failure with preserved ejection fraction  2.   Coronary artery disease manifest with unstable angina status day and limit sodium to <2 g daily. 7.  Will  repeat limited echocardiogram given acute on chronic congestive heart failure exacerbation. Return to clinic in 4 weeks for reassessment and further titration as needed. This note was scribed in the presence of  Azeem Godoy MD by Anya Schofield, RN    The scribes documentation has been prepared under my direction and personally reviewed by me in its entirety. I confirm that the note above accurately reflects all work, treatment, procedures, and medical decision making performed by me. Shawn Leger MD, personally performed the services described in this documentation as scribed by Anya Schofield RN in my presence, and it is both accurate and complete to the best of our ability. I will address the patient's cardiac risk factors and adjusted pharmacologic treatment as needed. In addition, I have reinforced the need for patient directed risk factor modification. All questions and concerns were addressed to the patient/family. Alternatives to my treatment were discussed. Thank you for allowing us to participate in the care of Lou Brown. Please call me with any questions [phone].     Azeem Godoy MD, [facility]   Cardiovascular Disease  [address]  [phone] [facility]  [phone] [address] [place] [address]  3/3/2021 11:04 AM

## 2021-03-02 ENCOUNTER — TELEPHONE (OUTPATIENT)
Dept: FAMILY MEDICINE CLINIC | Age: 61
End: 2021-03-02

## 2021-03-03 ENCOUNTER — OFFICE VISIT (OUTPATIENT)
Dept: CARDIOLOGY CLINIC | Age: 61
End: 2021-03-03
Payer: COMMERCIAL

## 2021-03-03 VITALS
HEIGHT: 64 IN | OXYGEN SATURATION: 94 % | BODY MASS INDEX: 50.81 KG/M2 | SYSTOLIC BLOOD PRESSURE: 140 MMHG | TEMPERATURE: 97.6 F | WEIGHT: 297.6 LBS | HEART RATE: 95 BPM | DIASTOLIC BLOOD PRESSURE: 70 MMHG

## 2021-03-03 DIAGNOSIS — Z95.1 S/P THREE VESSEL CORONARY ARTERY BYPASS: ICD-10-CM

## 2021-03-03 DIAGNOSIS — I50.31 ACUTE DIASTOLIC CONGESTIVE HEART FAILURE (HCC): ICD-10-CM

## 2021-03-03 DIAGNOSIS — I25.10 CORONARY ARTERY DISEASE INVOLVING NATIVE CORONARY ARTERY OF NATIVE HEART WITHOUT ANGINA PECTORIS: ICD-10-CM

## 2021-03-03 DIAGNOSIS — R06.02 SOB (SHORTNESS OF BREATH): Primary | ICD-10-CM

## 2021-03-03 DIAGNOSIS — E78.2 MIXED HYPERLIPIDEMIA: ICD-10-CM

## 2021-03-03 PROCEDURE — 99214 OFFICE O/P EST MOD 30 MIN: CPT | Performed by: INTERNAL MEDICINE

## 2021-03-03 RX ORDER — BUMETANIDE 2 MG/1
2 TABLET ORAL 2 TIMES DAILY
Qty: 180 TABLET | Refills: 3 | Status: SHIPPED
Start: 2021-03-03 | End: 2021-03-03 | Stop reason: CLARIF

## 2021-03-03 RX ORDER — LISINOPRIL 20 MG/1
20 TABLET ORAL DAILY
Qty: 90 TABLET | Refills: 3 | Status: SHIPPED | OUTPATIENT
Start: 2021-03-03 | End: 2021-03-15

## 2021-03-03 RX ORDER — FUROSEMIDE 80 MG
80 TABLET ORAL 2 TIMES DAILY
Qty: 180 TABLET | Refills: 3 | Status: ON HOLD | OUTPATIENT
Start: 2021-03-03 | End: 2021-04-20 | Stop reason: SDUPTHER

## 2021-03-03 NOTE — PATIENT INSTRUCTIONS
Plan:  1. Discussed will remain on dual antiplatelet therapy for at least 1 year given presentation of unstable angina. Will continue ASA 81mg life long. Medications reviewed today. 2.  . Continue metoprolol and Lisinopril at present dosing. Instructed to continue tracking his blood pressures at home. 3. Increase Lasix 60 mg twice daily. Consider up titration should his symptoms and weight fail to improve over the next week or so. Before changing he will check his bottle to see what dose he actually is taking our record shows 40 mg twice a day and he thinks they are 20 mg twice a day  4. Will increase Lisinopril to 20 mg daily ok to take 2 tabs of 10 mg to = 20 mg new 20 mg script sent in  5. Cardiac risk stratification education was reviewed including diet, exercise/activity, medication,and weight loss  6. Limit fluid to 64 ounces per day and watch low salt diet. Be more diligent on watching heart healthy diet. Avoid fried food,  red meats processed food eat more fish and chicken  7. Will  repeat limited echocardiogram given acute on chronic congestive heart failure exacerbation. 8. Return to clinic in 4weeks.

## 2021-03-09 ENCOUNTER — TELEPHONE (OUTPATIENT)
Dept: CARDIOLOGY CLINIC | Age: 61
End: 2021-03-09

## 2021-03-09 NOTE — TELEPHONE ENCOUNTER
Pt's kadie Crump is calling (she is on [employer] form) wanting to clarify what medications pt should be taking cardiac wise. She is trying to help him with his med's. She is afraid that he is taking to much diuretics. Per Vidya Crump pt has multiple bottles of lasix with doses from 10 mg to 80 mg.     Also, he has a bottle of Bumex 2 mg. (I did not see this on active med list.)

## 2021-03-09 NOTE — TELEPHONE ENCOUNTER
Per GLORIA the cardiac meds on medication list is correct. Spoke with [facility] went over meds again just to clarify what he is to be taking. She verbalized understanding and will help pt understand what he is to take and set up for him. She was told to call if any other questions or concerns she or pt may have.

## 2021-03-12 ENCOUNTER — HOSPITAL ENCOUNTER (OUTPATIENT)
Dept: NON INVASIVE DIAGNOSTICS | Age: 61
Discharge: HOME | End: 2021-03-12
Payer: COMMERCIAL

## 2021-03-12 ENCOUNTER — TELEPHONE (OUTPATIENT)
Dept: CARDIOLOGY | Age: 61
End: 2021-03-12

## 2021-03-12 DIAGNOSIS — R06.09 DYSPNEA ON EXERTION: Primary | ICD-10-CM

## 2021-03-12 DIAGNOSIS — R06.02 SOB (SHORTNESS OF BREATH): ICD-10-CM

## 2021-03-12 PROCEDURE — 6360000004 HC RX CONTRAST MEDICATION: Performed by: INTERNAL MEDICINE

## 2021-03-12 PROCEDURE — 93308 TTE F-UP OR LMTD: CPT

## 2021-03-12 RX ADMIN — PERFLUTREN 1.3 ML: 6.52 INJECTION, SUSPENSION INTRAVENOUS at 08:51

## 2021-03-12 ASSESSMENT — ENCOUNTER SYMPTOMS
NAUSEA: 0
GASTROINTESTINAL NEGATIVE: 1
ANAL BLEEDING: 0
ABDOMINAL PAIN: 0
SHORTNESS OF BREATH: 1
BLOOD IN STOOL: 0
ALLERGIC/IMMUNOLOGIC NEGATIVE: 1
WHEEZING: 0
EYES NEGATIVE: 1

## 2021-03-12 NOTE — TELEPHONE ENCOUNTER
Attempted to reach patient regarding echo results. VM left. Will contact again Monday to discuss.      GLORIA

## 2021-03-12 NOTE — PROGRESS NOTES
Definity 2 ml given (diluted with 8.7 ml of 0.9% NS at R hand. PIV removed, catheter intact and pressure drsg applied.

## 2021-03-15 RX ORDER — LISINOPRIL 20 MG/1
10 TABLET ORAL DAILY
Qty: 90 TABLET | Refills: 3 | Status: ON HOLD
Start: 2021-03-15 | End: 2021-04-20 | Stop reason: HOSPADM

## 2021-03-15 NOTE — TELEPHONE ENCOUNTER
I spoke with Joe Kwon this afternoon. Disclose results of echo. He has drop in EF. Will proceed with Lexiscan stress test.  He endorsed blood pressure 90/60 today that has sustained over a couple day period. He has no presyncope or syncope. He has lost 8 pounds with 80 mg Lasix twice daily now. Advised Lexiscan and decrease lisinopril to 10 mg daily.     GLORIA

## 2021-03-15 NOTE — TELEPHONE ENCOUNTER
Attempted to call to discuss echo results/plan.  Will attempt later this PM. Let me know if calls back    GLORIA

## 2021-04-08 ENCOUNTER — APPOINTMENT (OUTPATIENT)
Dept: GENERAL RADIOLOGY | Age: 61
DRG: 392 | End: 2021-04-08
Payer: COMMERCIAL

## 2021-04-08 ENCOUNTER — APPOINTMENT (OUTPATIENT)
Dept: CT IMAGING | Age: 61
DRG: 392 | End: 2021-04-08
Payer: COMMERCIAL

## 2021-04-08 ENCOUNTER — TELEPHONE (OUTPATIENT)
Dept: CARDIOLOGY CLINIC | Age: 61
End: 2021-04-08

## 2021-04-08 ENCOUNTER — HOSPITAL ENCOUNTER (INPATIENT)
Age: 61
LOS: 6 days | Discharge: ANOTHER HOSPITAL | DRG: 392 | End: 2021-04-14
Attending: STUDENT IN AN ORGANIZED HEALTH CARE EDUCATION/TRAINING PROGRAM | Admitting: HOSPITALIST
Payer: COMMERCIAL

## 2021-04-08 DIAGNOSIS — K57.32 DIVERTICULITIS OF COLON: Primary | ICD-10-CM

## 2021-04-08 DIAGNOSIS — N17.9 ACUTE KIDNEY INJURY (HCC): ICD-10-CM

## 2021-04-08 LAB
A/G RATIO: 1.3 (ref 1.1–2.2)
ALBUMIN SERPL-MCNC: 4 G/DL (ref 3.4–5)
ALP BLD-CCNC: 112 U/L (ref 40–129)
ALT SERPL-CCNC: 21 U/L (ref 10–40)
ANION GAP SERPL CALCULATED.3IONS-SCNC: 12 MMOL/L (ref 3–16)
AST SERPL-CCNC: 16 U/L (ref 15–37)
BASOPHILS ABSOLUTE: 0.2 K/UL (ref 0–0.2)
BASOPHILS RELATIVE PERCENT: 1.1 %
BILIRUB SERPL-MCNC: 0.5 MG/DL (ref 0–1)
BILIRUBIN URINE: NEGATIVE
BLOOD, URINE: NEGATIVE
BUN BLDV-MCNC: 62 MG/DL (ref 7–20)
CALCIUM SERPL-MCNC: 9.2 MG/DL (ref 8.3–10.6)
CHLORIDE BLD-SCNC: 100 MMOL/L (ref 99–110)
CLARITY: CLEAR
CO2: 26 MMOL/L (ref 21–32)
COLOR: YELLOW
CREAT SERPL-MCNC: 2.6 MG/DL (ref 0.9–1.3)
EOSINOPHILS ABSOLUTE: 0.4 K/UL (ref 0–0.6)
EOSINOPHILS RELATIVE PERCENT: 2.8 %
GFR AFRICAN AMERICAN: 31
GFR NON-AFRICAN AMERICAN: 25
GLOBULIN: 3 G/DL
GLUCOSE BLD-MCNC: 104 MG/DL (ref 70–99)
GLUCOSE URINE: NEGATIVE MG/DL
HCT VFR BLD CALC: 40.5 % (ref 40.5–52.5)
HEMOGLOBIN: 13.4 G/DL (ref 13.5–17.5)
INFLUENZA A: NOT DETECTED
INFLUENZA B: NOT DETECTED
KETONES, URINE: NEGATIVE MG/DL
LEUKOCYTE ESTERASE, URINE: NEGATIVE
LYMPHOCYTES ABSOLUTE: 2.3 K/UL (ref 1–5.1)
LYMPHOCYTES RELATIVE PERCENT: 16.1 %
MCH RBC QN AUTO: 29.2 PG (ref 26–34)
MCHC RBC AUTO-ENTMCNC: 33.2 G/DL (ref 31–36)
MCV RBC AUTO: 87.9 FL (ref 80–100)
MICROSCOPIC EXAMINATION: NORMAL
MONOCYTES ABSOLUTE: 1 K/UL (ref 0–1.3)
MONOCYTES RELATIVE PERCENT: 6.8 %
NEUTROPHILS ABSOLUTE: 10.6 K/UL (ref 1.7–7.7)
NEUTROPHILS RELATIVE PERCENT: 73.2 %
NITRITE, URINE: NEGATIVE
PDW BLD-RTO: 18.1 % (ref 12.4–15.4)
PH UA: 5.5 (ref 5–8)
PLATELET # BLD: 192 K/UL (ref 135–450)
PMV BLD AUTO: 8.1 FL (ref 5–10.5)
POTASSIUM REFLEX MAGNESIUM: 4.6 MMOL/L (ref 3.5–5.1)
PROCALCITONIN: 0.11 NG/ML (ref 0–0.15)
PROTEIN UA: NEGATIVE MG/DL
RBC # BLD: 4.61 M/UL (ref 4.2–5.9)
SARS-COV-2 RNA, RT PCR: NOT DETECTED
SODIUM BLD-SCNC: 138 MMOL/L (ref 136–145)
SPECIFIC GRAVITY UA: 1.02 (ref 1–1.03)
TOTAL PROTEIN: 7 G/DL (ref 6.4–8.2)
TROPONIN: <0.01 NG/ML
URINE TYPE: NORMAL
UROBILINOGEN, URINE: 0.2 E.U./DL
WBC # BLD: 14.5 K/UL (ref 4–11)

## 2021-04-08 PROCEDURE — 96366 THER/PROPH/DIAG IV INF ADDON: CPT

## 2021-04-08 PROCEDURE — 96375 TX/PRO/DX INJ NEW DRUG ADDON: CPT

## 2021-04-08 PROCEDURE — 93005 ELECTROCARDIOGRAM TRACING: CPT | Performed by: STUDENT IN AN ORGANIZED HEALTH CARE EDUCATION/TRAINING PROGRAM

## 2021-04-08 PROCEDURE — 2580000003 HC RX 258: Performed by: STUDENT IN AN ORGANIZED HEALTH CARE EDUCATION/TRAINING PROGRAM

## 2021-04-08 PROCEDURE — 99285 EMERGENCY DEPT VISIT HI MDM: CPT

## 2021-04-08 PROCEDURE — 85025 COMPLETE CBC W/AUTO DIFF WBC: CPT

## 2021-04-08 PROCEDURE — 81003 URINALYSIS AUTO W/O SCOPE: CPT

## 2021-04-08 PROCEDURE — 80053 COMPREHEN METABOLIC PANEL: CPT

## 2021-04-08 PROCEDURE — 6360000002 HC RX W HCPCS

## 2021-04-08 PROCEDURE — 2060000000 HC ICU INTERMEDIATE R&B

## 2021-04-08 PROCEDURE — 74176 CT ABD & PELVIS W/O CONTRAST: CPT

## 2021-04-08 PROCEDURE — 84484 ASSAY OF TROPONIN QUANT: CPT

## 2021-04-08 PROCEDURE — 71045 X-RAY EXAM CHEST 1 VIEW: CPT

## 2021-04-08 PROCEDURE — 96365 THER/PROPH/DIAG IV INF INIT: CPT

## 2021-04-08 PROCEDURE — 87636 SARSCOV2 & INF A&B AMP PRB: CPT

## 2021-04-08 PROCEDURE — 6360000002 HC RX W HCPCS: Performed by: STUDENT IN AN ORGANIZED HEALTH CARE EDUCATION/TRAINING PROGRAM

## 2021-04-08 PROCEDURE — 84145 PROCALCITONIN (PCT): CPT

## 2021-04-08 RX ORDER — FENTANYL CITRATE 50 UG/ML
50 INJECTION, SOLUTION INTRAMUSCULAR; INTRAVENOUS ONCE
Status: COMPLETED | OUTPATIENT
Start: 2021-04-08 | End: 2021-04-08

## 2021-04-08 RX ORDER — ONDANSETRON 2 MG/ML
INJECTION INTRAMUSCULAR; INTRAVENOUS
Status: COMPLETED
Start: 2021-04-08 | End: 2021-04-08

## 2021-04-08 RX ORDER — 0.9 % SODIUM CHLORIDE 0.9 %
500 INTRAVENOUS SOLUTION INTRAVENOUS ONCE
Status: COMPLETED | OUTPATIENT
Start: 2021-04-08 | End: 2021-04-08

## 2021-04-08 RX ORDER — ONDANSETRON 2 MG/ML
4 INJECTION INTRAMUSCULAR; INTRAVENOUS EVERY 6 HOURS PRN
Status: DISCONTINUED | OUTPATIENT
Start: 2021-04-08 | End: 2021-04-09 | Stop reason: SDUPTHER

## 2021-04-08 RX ADMIN — ONDANSETRON 4 MG: 2 INJECTION INTRAMUSCULAR; INTRAVENOUS at 20:14

## 2021-04-08 RX ADMIN — SODIUM CHLORIDE 500 ML: 9 INJECTION, SOLUTION INTRAVENOUS at 19:13

## 2021-04-08 RX ADMIN — PIPERACILLIN SODIUM AND TAZOBACTAM SODIUM 4500 MG: 4; .5 INJECTION, POWDER, LYOPHILIZED, FOR SOLUTION INTRAVENOUS at 20:14

## 2021-04-08 RX ADMIN — FENTANYL CITRATE 50 MCG: 50 INJECTION, SOLUTION INTRAMUSCULAR; INTRAVENOUS at 22:04

## 2021-04-08 ASSESSMENT — PAIN DESCRIPTION - DESCRIPTORS: DESCRIPTORS: ACHING

## 2021-04-08 ASSESSMENT — PAIN DESCRIPTION - ORIENTATION: ORIENTATION: LEFT

## 2021-04-08 ASSESSMENT — PAIN DESCRIPTION - LOCATION: LOCATION: CHEST

## 2021-04-08 ASSESSMENT — PAIN DESCRIPTION - FREQUENCY: FREQUENCY: CONTINUOUS

## 2021-04-08 NOTE — TELEPHONE ENCOUNTER
Pt's wife called and sts that the pt's BP has been running low. Pt is on Lisinopril 10mg qd and Metoprolol 25mg BID. Pt's wife sts that the pt's BP has been running 74/52. They have stopped taking the medications until further notice from [facility]. Please advise. Thank you. Last OV 3/3/21  PLAN:  1. Discussed will remain on dual antiplatelet therapy for at least 1 year given presentation of unstable angina. Will continue ASA 81mg life long. Medications reviewed today. 2. Continue metoprolol at present dosing. 3. Increase Lasix 80 mg twice daily. Check BMP/pro-bnp in 2 weeks  4. Increase lisinopril to 20 mg daily and follow home BP's. 5. Cardiac risk stratification education was reviewed including diet, exercise/activity, medication,and weight loss  6. Instructed to Limit fluid to 64 ounces per day and limit sodium to <2 g daily. 7.  Will  repeat limited echocardiogram given acute on chronic congestive heart failure exacerbation.

## 2021-04-08 NOTE — ED PROVIDER NOTES
lumbar region 1/6/2020    Tobacco use 1/6/2020     Past Surgical History:   Procedure Laterality Date    CHOLECYSTECTOMY      CORONARY ARTERY BYPASS GRAFT N/A 9/25/2020    CORONARY ARTERY BYPASS GRAFTING X3, INTERNAL MAMMARY ARTERY, SAPHENOUS VEIN GRAFT, ON PUMP, STERNAL PLATING, 5 LEVEL BILATERAL INTERCOSTAL NERVE BLOCK, PLATELET GEL APPLICATION performed by Samira Haynes MD at [address]  03/16/2011    cystoscopy left ureteroscopy, left retrograde, double J stent placement    FINGER AMPUTATION Right 2/2/2021    RIGHT MIDDLE FINGER IRRIGATION AND DEBRIDEMENT WITH REVISION AMPUTATION AND BONE SHORTENING, POSSIBLE NAIL ABLATION performed by Emil Mustafa MD at [address]  Right 12/24/2019    RIGHT LUMBAR FIVE SACRAL ONE TRANSFORAMINAL EPIDURAL STEROID INJECTION SITE CONFIRMED BY FLUOROSCOPY performed by Lb Cortés MD at [address] Right 1/7/2020    RIGHT LUMBAR FOUR AND LUMBAR FIVE TRANSFORAMINAL EPIDURAL STEROID INJECTION SITE CONFIRMED BY FLUOROSCOPY performed by Lb Cortés MD at [place] [address]     Family History   Problem Relation Age of Onset    Heart Disease Mother     Other Mother         copd    Liver Disease Father     High Blood Pressure Sister     No Known Problems Sister      Social History     Socioeconomic History    Marital status:      Spouse name: Not on file    Number of children: Not on file    Years of education: Not on file    Highest education level: Not on file   Occupational History    Not on file   Social Needs    Financial resource strain: Not on file    Food insecurity     Worry: Not on file     Inability: Not on file    Transportation needs     Medical: Not on file     Non-medical: Not on file   Tobacco Use    Smoking status: Former Smoker     Packs/day: 2.00     Years: 35.00     Pack years: 70.00     Types: Cigarettes     Quit date: 3/1/2020     Years since quitting: 1. 1    Smokeless tobacco: Never Used   Substance and Sexual Activity    Alcohol use: No    Drug use: Not Currently     Comment: hx of drug use    Sexual activity: Not on file   Lifestyle    Physical activity     Days per week: Not on file     Minutes per session: Not on file    Stress: Not on file   Relationships    Social connections     Talks on phone: Not on file     Gets together: Not on file     Attends Sabianism service: Not on file     Active member of club or organization: Not on file     Attends meetings of clubs or organizations: Not on file     Relationship status: Not on file    Intimate partner violence     Fear of current or ex partner: Not on file     Emotionally abused: Not on file     Physically abused: Not on file     Forced sexual activity: Not on file   Other Topics Concern    Not on file   Social History Narrative    Not on file     Current Facility-Administered Medications   Medication Dose Route Frequency Provider Last Rate Last Admin    ondansetron (ZOFRAN) injection 4 mg  4 mg Intravenous Q6H PRN Vinny Pace MD   4 mg at 04/08/21 2014     Current Outpatient Medications   Medication Sig Dispense Refill    lisinopril (PRINIVIL;ZESTRIL) 20 MG tablet Take 0.5 tablets by mouth daily 90 tablet 3    furosemide (LASIX) 80 MG tablet Take 1 tablet by mouth 2 times daily 180 tablet 3    ipratropium (ATROVENT) 0.02 % nebulizer solution Take 2.5 mLs by nebulization 3 times daily 225 mL 3    Handicap Placard MISC by Does not apply route Duration - 5 years 1 each 0    traZODone (DESYREL) 100 MG tablet Take 1 tablet by mouth nightly 30 tablet 5    sertraline (ZOLOFT) 100 MG tablet Take 1 tablet by mouth daily 30 tablet 3    acetaminophen (TYLENOL) 500 MG tablet Take 2 tablets by mouth 3 times daily for 10 days 60 tablet 0    clopidogrel (PLAVIX) 75 MG tablet Take 1 tablet by mouth daily 90 tablet 3    metoprolol tartrate (LOPRESSOR) 25 MG tablet Take 1 tablet by mouth 2 times daily Hold this medication for pulse <80 or systolic BP <038 90 tablet 3    ibuprofen (ADVIL;MOTRIN) 800 MG tablet Take 1 tablet by mouth 3 times daily 90 tablet 1    atorvastatin (LIPITOR) 80 MG tablet Take 1 tablet by mouth nightly 90 tablet 3    aspirin 81 MG EC tablet Take 1 tablet by mouth daily 30 tablet 0    albuterol (PROVENTIL) (2.5 MG/3ML) 0.083% nebulizer solution Take 3 mLs by nebulization every 6 hours as needed for Wheezing or Shortness of Breath 25 vial 1    albuterol sulfate HFA (PROVENTIL HFA) 108 (90 Base) MCG/ACT inhaler Inhale 2 puffs into the lungs every 6 hours as needed for Wheezing 1 Inhaler 0     Allergies   Allergen Reactions    Dilaudid [Hydromorphone Hcl] Nausea And Vomiting    Codeine Itching       REVIEW OF SYSTEMS  10 systems reviewed, pertinent positives per HPI otherwise noted to be negative. PHYSICAL EXAM  BP (!) 123/56   Pulse 84   Temp 98.6 °F (37 °C) (Oral)   Resp 16   Ht 5' 4\" (1.626 m)   Wt 284 lb (128.8 kg)   SpO2 (!) 89%   BMI 48.75 kg/m²    GENERAL APPEARANCE: Awake and alert. Cooperative. No acute distress. HENT: Normocephalic. Atraumatic  NECK: Supple. EYES: PERRL. EOM's grossly intact. HEART/CHEST: RRR. No murmurs. LUNGS: Respirations unlabored. CTAB. Good air exchange. Speaking comfortably in full sentences. ABDOMEN: Mild tenderness palpation left lower quadrant. Soft. Non-distended. No masses. No organomegaly. No guarding or rebound. MUSCULOSKELETAL: No extremity edema. Compartments soft. No deformity. No tenderness in the extremities. All extremities neurovascularly intact. SKIN: Warm and dry. No acute rashes. NEUROLOGICAL: Alert and oriented. CN's 2-12 intact. No gross facial drooping. Strength 5/5, sensation intact. PSYCHIATRIC: Normal mood and affect. LABS  I have reviewed all labs for this visit.    Results for orders placed or performed during the hospital encounter of 04/08/21   COVID-19 & Influenza Combo    Specimen: Nasopharyngeal Swab   Result Value Ref Range    SARS-CoV-2 RNA, RT PCR NOT DETECTED NOT DETECTED    INFLUENZA A NOT DETECTED NOT DETECTED    INFLUENZA B NOT DETECTED NOT DETECTED   CBC auto differential   Result Value Ref Range    WBC 14.5 (H) 4.0 - 11.0 K/uL    RBC 4.61 4.20 - 5.90 M/uL    Hemoglobin 13.4 (L) 13.5 - 17.5 g/dL    Hematocrit 40.5 40.5 - 52.5 %    MCV 87.9 80.0 - 100.0 fL    MCH 29.2 26.0 - 34.0 pg    MCHC 33.2 31.0 - 36.0 g/dL    RDW 18.1 (H) 12.4 - 15.4 %    Platelets 992 276 - 210 K/uL    MPV 8.1 5.0 - 10.5 fL    Neutrophils % 73.2 %    Lymphocytes % 16.1 %    Monocytes % 6.8 %    Eosinophils % 2.8 %    Basophils % 1.1 %    Neutrophils Absolute 10.6 (H) 1.7 - 7.7 K/uL    Lymphocytes Absolute 2.3 1.0 - 5.1 K/uL    Monocytes Absolute 1.0 0.0 - 1.3 K/uL    Eosinophils Absolute 0.4 0.0 - 0.6 K/uL    Basophils Absolute 0.2 0.0 - 0.2 K/uL   Comprehensive Metabolic Panel w/ Reflex to MG   Result Value Ref Range    Sodium 138 136 - 145 mmol/L    Potassium reflex Magnesium 4.6 3.5 - 5.1 mmol/L    Chloride 100 99 - 110 mmol/L    CO2 26 21 - 32 mmol/L    Anion Gap 12 3 - 16    Glucose 104 (H) 70 - 99 mg/dL    BUN 62 (H) 7 - 20 mg/dL    CREATININE 2.6 (H) 0.9 - 1.3 mg/dL    GFR Non-African American 25 (A) >60    GFR  31 (A) >60    Calcium 9.2 8.3 - 10.6 mg/dL    Total Protein 7.0 6.4 - 8.2 g/dL    Albumin 4.0 3.4 - 5.0 g/dL    Albumin/Globulin Ratio 1.3 1.1 - 2.2    Total Bilirubin 0.5 0.0 - 1.0 mg/dL    Alkaline Phosphatase 112 40 - 129 U/L    ALT 21 10 - 40 U/L    AST 16 15 - 37 U/L    Globulin 3.0 g/dL   Troponin   Result Value Ref Range    Troponin <0.01 <0.01 ng/mL   Procalcitonin   Result Value Ref Range    Procalcitonin 0.11 0.00 - 0.15 ng/mL   EKG 12 Lead   Result Value Ref Range    Ventricular Rate 91 BPM    Atrial Rate 91 BPM    P-R Interval 168 ms    QRS Duration 78 ms    Q-T Interval 318 ms    QTc Calculation (Bazett) 391 ms    P Axis -14 degrees    R Axis -38 degrees    T Axis 60 degrees Diagnosis       Normal sinus rhythmLeft axis deviationPulmonary disease patternInferior infarct , age undeterminedAbnormal ECGWhen compared with ECG of 19-OCT-2020 15:37,QRS axis Shifted leftInferior infarct is now PresentNonspecific T wave abnormality no longer evident in Anterolateral leads       ECG  The Ekg interpreted by me shows  normal sinus rhythm with a rate of 91  Axis is   Left axis deviation  QTc is  normal  Intervals and Durations are unremarkable. ST Segments: nonspecific changes  No significant change from prior EKG dated 10/19/2020    RADIOLOGY  CT ABDOMEN PELVIS WO CONTRAST Additional Contrast? None   Final Result   Acute, uncomplicated diverticulitis of the sigmoid colon. Nonobstructive left nephrolithiasis. XR CHEST PORTABLE   Final Result   No acute cardiopulmonary disease           ED COURSE/MDM  Patient seen and evaluated. Old records reviewed. Labs and imaging reviewed and results discussed with patient. Patient is a 65-year-old male, presenting with concerns for generalized malaise, left lower quadrant abdominal pain, low blood pressure, as well as chronic chest pain and cough. Full HPI as detailed above. Upon arrival in the ED, vitals reassuring. Patient was treated with IV fluids. Physical exam reveals mild tenderness palpation left lower quadrant. Labs were performed, revealed acute kidney injury with a creatinine of 2.6 which appears to be new for the patient. Also with a leukocytosis of 14.5. LFTs are within normal limits. Troponin is negative. EKG without evidence of acute ischemia. Covid and influenza tests were negative. CT abdomen pelvis revealed evidence of uncomplicated diverticulitis. Patient was started on Zosyn. Treated with pain and nausea control here in the ED. Patient will be hospitalized for further work-up and treatment of his condition given his diverticulitis and acute kidney injury.   Patient is comfortable and in agreement with plan

## 2021-04-09 LAB
ALBUMIN SERPL-MCNC: 3.4 G/DL (ref 3.4–5)
ALP BLD-CCNC: 92 U/L (ref 40–129)
ALT SERPL-CCNC: 16 U/L (ref 10–40)
ANION GAP SERPL CALCULATED.3IONS-SCNC: 10 MMOL/L (ref 3–16)
AST SERPL-CCNC: 13 U/L (ref 15–37)
BASOPHILS ABSOLUTE: 0.1 K/UL (ref 0–0.2)
BASOPHILS RELATIVE PERCENT: 1 %
BILIRUB SERPL-MCNC: 0.5 MG/DL (ref 0–1)
BILIRUBIN DIRECT: <0.2 MG/DL (ref 0–0.3)
BILIRUBIN, INDIRECT: ABNORMAL MG/DL (ref 0–1)
BUN BLDV-MCNC: 59 MG/DL (ref 7–20)
CALCIUM SERPL-MCNC: 8.6 MG/DL (ref 8.3–10.6)
CHLORIDE BLD-SCNC: 104 MMOL/L (ref 99–110)
CO2: 25 MMOL/L (ref 21–32)
CREAT SERPL-MCNC: 2.2 MG/DL (ref 0.9–1.3)
EKG ATRIAL RATE: 91 BPM
EKG DIAGNOSIS: NORMAL
EKG P AXIS: -14 DEGREES
EKG P-R INTERVAL: 168 MS
EKG Q-T INTERVAL: 318 MS
EKG QRS DURATION: 78 MS
EKG QTC CALCULATION (BAZETT): 391 MS
EKG R AXIS: -38 DEGREES
EKG T AXIS: 60 DEGREES
EKG VENTRICULAR RATE: 91 BPM
EOSINOPHILS ABSOLUTE: 0.5 K/UL (ref 0–0.6)
EOSINOPHILS RELATIVE PERCENT: 4.2 %
GFR AFRICAN AMERICAN: 37
GFR NON-AFRICAN AMERICAN: 31
GLUCOSE BLD-MCNC: 108 MG/DL (ref 70–99)
HCT VFR BLD CALC: 37.8 % (ref 40.5–52.5)
HEMOGLOBIN: 12.3 G/DL (ref 13.5–17.5)
LYMPHOCYTES ABSOLUTE: 2.4 K/UL (ref 1–5.1)
LYMPHOCYTES RELATIVE PERCENT: 22 %
MCH RBC QN AUTO: 29 PG (ref 26–34)
MCHC RBC AUTO-ENTMCNC: 32.6 G/DL (ref 31–36)
MCV RBC AUTO: 89.2 FL (ref 80–100)
MONOCYTES ABSOLUTE: 0.9 K/UL (ref 0–1.3)
MONOCYTES RELATIVE PERCENT: 7.9 %
NEUTROPHILS ABSOLUTE: 7.2 K/UL (ref 1.7–7.7)
NEUTROPHILS RELATIVE PERCENT: 64.9 %
PDW BLD-RTO: 18.2 % (ref 12.4–15.4)
PLATELET # BLD: 173 K/UL (ref 135–450)
PMV BLD AUTO: 8.1 FL (ref 5–10.5)
POTASSIUM REFLEX MAGNESIUM: 4.6 MMOL/L (ref 3.5–5.1)
RBC # BLD: 4.24 M/UL (ref 4.2–5.9)
SODIUM BLD-SCNC: 139 MMOL/L (ref 136–145)
TOTAL PROTEIN: 6.3 G/DL (ref 6.4–8.2)
TROPONIN: <0.01 NG/ML
WBC # BLD: 11 K/UL (ref 4–11)

## 2021-04-09 PROCEDURE — 2500000003 HC RX 250 WO HCPCS: Performed by: HOSPITALIST

## 2021-04-09 PROCEDURE — 80076 HEPATIC FUNCTION PANEL: CPT

## 2021-04-09 PROCEDURE — 2060000000 HC ICU INTERMEDIATE R&B

## 2021-04-09 PROCEDURE — 99232 SBSQ HOSP IP/OBS MODERATE 35: CPT | Performed by: INTERNAL MEDICINE

## 2021-04-09 PROCEDURE — 36415 COLL VENOUS BLD VENIPUNCTURE: CPT

## 2021-04-09 PROCEDURE — 80048 BASIC METABOLIC PNL TOTAL CA: CPT

## 2021-04-09 PROCEDURE — 84484 ASSAY OF TROPONIN QUANT: CPT

## 2021-04-09 PROCEDURE — 85025 COMPLETE CBC W/AUTO DIFF WBC: CPT

## 2021-04-09 PROCEDURE — 6360000002 HC RX W HCPCS: Performed by: HOSPITALIST

## 2021-04-09 PROCEDURE — 2580000003 HC RX 258: Performed by: PHYSICIAN ASSISTANT

## 2021-04-09 PROCEDURE — 6360000002 HC RX W HCPCS: Performed by: PHYSICIAN ASSISTANT

## 2021-04-09 PROCEDURE — 2580000003 HC RX 258: Performed by: HOSPITALIST

## 2021-04-09 PROCEDURE — 6370000000 HC RX 637 (ALT 250 FOR IP): Performed by: HOSPITALIST

## 2021-04-09 PROCEDURE — 93010 ELECTROCARDIOGRAM REPORT: CPT | Performed by: INTERNAL MEDICINE

## 2021-04-09 PROCEDURE — 94640 AIRWAY INHALATION TREATMENT: CPT

## 2021-04-09 RX ORDER — MORPHINE SULFATE 2 MG/ML
2 INJECTION, SOLUTION INTRAMUSCULAR; INTRAVENOUS
Status: DISCONTINUED | OUTPATIENT
Start: 2021-04-09 | End: 2021-04-14 | Stop reason: HOSPADM

## 2021-04-09 RX ORDER — SODIUM CHLORIDE 0.9 % (FLUSH) 0.9 %
5-40 SYRINGE (ML) INJECTION EVERY 12 HOURS SCHEDULED
Status: DISCONTINUED | OUTPATIENT
Start: 2021-04-09 | End: 2021-04-14 | Stop reason: HOSPADM

## 2021-04-09 RX ORDER — ONDANSETRON 2 MG/ML
4 INJECTION INTRAMUSCULAR; INTRAVENOUS EVERY 6 HOURS PRN
Status: DISCONTINUED | OUTPATIENT
Start: 2021-04-09 | End: 2021-04-14 | Stop reason: HOSPADM

## 2021-04-09 RX ORDER — CLOPIDOGREL BISULFATE 75 MG/1
75 TABLET ORAL DAILY
Status: DISCONTINUED | OUTPATIENT
Start: 2021-04-09 | End: 2021-04-14 | Stop reason: HOSPADM

## 2021-04-09 RX ORDER — ACETAMINOPHEN 325 MG/1
650 TABLET ORAL EVERY 6 HOURS PRN
Status: DISCONTINUED | OUTPATIENT
Start: 2021-04-09 | End: 2021-04-14 | Stop reason: HOSPADM

## 2021-04-09 RX ORDER — CIPROFLOXACIN 2 MG/ML
400 INJECTION, SOLUTION INTRAVENOUS EVERY 12 HOURS
Status: DISCONTINUED | OUTPATIENT
Start: 2021-04-09 | End: 2021-04-09

## 2021-04-09 RX ORDER — FENTANYL CITRATE 50 UG/ML
50 INJECTION, SOLUTION INTRAMUSCULAR; INTRAVENOUS
Status: DISCONTINUED | OUTPATIENT
Start: 2021-04-09 | End: 2021-04-09

## 2021-04-09 RX ORDER — ALBUTEROL SULFATE 2.5 MG/3ML
2.5 SOLUTION RESPIRATORY (INHALATION) EVERY 6 HOURS PRN
Status: DISCONTINUED | OUTPATIENT
Start: 2021-04-09 | End: 2021-04-14 | Stop reason: HOSPADM

## 2021-04-09 RX ORDER — POLYETHYLENE GLYCOL 3350 17 G/17G
17 POWDER, FOR SOLUTION ORAL DAILY PRN
Status: DISCONTINUED | OUTPATIENT
Start: 2021-04-09 | End: 2021-04-14 | Stop reason: HOSPADM

## 2021-04-09 RX ORDER — SODIUM CHLORIDE 9 MG/ML
25 INJECTION, SOLUTION INTRAVENOUS PRN
Status: DISCONTINUED | OUTPATIENT
Start: 2021-04-09 | End: 2021-04-14 | Stop reason: HOSPADM

## 2021-04-09 RX ORDER — SERTRALINE HYDROCHLORIDE 100 MG/1
100 TABLET, FILM COATED ORAL DAILY
Status: DISCONTINUED | OUTPATIENT
Start: 2021-04-09 | End: 2021-04-14 | Stop reason: HOSPADM

## 2021-04-09 RX ORDER — ATORVASTATIN CALCIUM 40 MG/1
80 TABLET, FILM COATED ORAL NIGHTLY
Status: DISCONTINUED | OUTPATIENT
Start: 2021-04-09 | End: 2021-04-14 | Stop reason: HOSPADM

## 2021-04-09 RX ORDER — ACETAMINOPHEN 650 MG/1
650 SUPPOSITORY RECTAL EVERY 6 HOURS PRN
Status: DISCONTINUED | OUTPATIENT
Start: 2021-04-09 | End: 2021-04-14 | Stop reason: HOSPADM

## 2021-04-09 RX ORDER — SODIUM CHLORIDE 0.9 % (FLUSH) 0.9 %
5-40 SYRINGE (ML) INJECTION PRN
Status: DISCONTINUED | OUTPATIENT
Start: 2021-04-09 | End: 2021-04-14 | Stop reason: HOSPADM

## 2021-04-09 RX ORDER — TRAZODONE HYDROCHLORIDE 100 MG/1
100 TABLET ORAL NIGHTLY
Status: DISCONTINUED | OUTPATIENT
Start: 2021-04-09 | End: 2021-04-14 | Stop reason: HOSPADM

## 2021-04-09 RX ORDER — ASPIRIN 81 MG/1
81 TABLET ORAL DAILY
Status: DISCONTINUED | OUTPATIENT
Start: 2021-04-09 | End: 2021-04-14 | Stop reason: HOSPADM

## 2021-04-09 RX ORDER — SODIUM CHLORIDE 9 MG/ML
INJECTION, SOLUTION INTRAVENOUS CONTINUOUS
Status: DISCONTINUED | OUTPATIENT
Start: 2021-04-09 | End: 2021-04-10

## 2021-04-09 RX ORDER — PROMETHAZINE HYDROCHLORIDE 25 MG/1
12.5 TABLET ORAL EVERY 6 HOURS PRN
Status: DISCONTINUED | OUTPATIENT
Start: 2021-04-09 | End: 2021-04-14 | Stop reason: HOSPADM

## 2021-04-09 RX ADMIN — METOPROLOL TARTRATE 25 MG: 25 TABLET, FILM COATED ORAL at 09:47

## 2021-04-09 RX ADMIN — CIPROFLOXACIN 400 MG: 2 INJECTION, SOLUTION INTRAVENOUS at 00:47

## 2021-04-09 RX ADMIN — IPRATROPIUM BROMIDE 0.5 MG: 0.5 SOLUTION RESPIRATORY (INHALATION) at 07:19

## 2021-04-09 RX ADMIN — SODIUM CHLORIDE: 9 INJECTION, SOLUTION INTRAVENOUS at 17:11

## 2021-04-09 RX ADMIN — SODIUM CHLORIDE: 9 INJECTION, SOLUTION INTRAVENOUS at 06:32

## 2021-04-09 RX ADMIN — TRAZODONE HYDROCHLORIDE 100 MG: 100 TABLET ORAL at 20:13

## 2021-04-09 RX ADMIN — METRONIDAZOLE 500 MG: 500 INJECTION, SOLUTION INTRAVENOUS at 00:48

## 2021-04-09 RX ADMIN — CLOPIDOGREL BISULFATE 75 MG: 75 TABLET ORAL at 09:47

## 2021-04-09 RX ADMIN — MORPHINE SULFATE 2 MG: 2 INJECTION, SOLUTION INTRAMUSCULAR; INTRAVENOUS at 19:38

## 2021-04-09 RX ADMIN — ATORVASTATIN CALCIUM 80 MG: 40 TABLET, FILM COATED ORAL at 20:13

## 2021-04-09 RX ADMIN — SERTRALINE 100 MG: 100 TABLET, FILM COATED ORAL at 09:47

## 2021-04-09 RX ADMIN — ONDANSETRON 4 MG: 2 INJECTION INTRAMUSCULAR; INTRAVENOUS at 00:47

## 2021-04-09 RX ADMIN — MORPHINE SULFATE 2 MG: 2 INJECTION, SOLUTION INTRAMUSCULAR; INTRAVENOUS at 14:05

## 2021-04-09 RX ADMIN — CEFTRIAXONE SODIUM 1000 MG: 1 INJECTION, POWDER, FOR SOLUTION INTRAMUSCULAR; INTRAVENOUS at 14:05

## 2021-04-09 RX ADMIN — ENOXAPARIN SODIUM 40 MG: 40 INJECTION SUBCUTANEOUS at 09:47

## 2021-04-09 RX ADMIN — ASPIRIN 81 MG: 81 TABLET, COATED ORAL at 09:47

## 2021-04-09 RX ADMIN — FENTANYL CITRATE 50 MCG: 50 INJECTION, SOLUTION INTRAMUSCULAR; INTRAVENOUS at 01:56

## 2021-04-09 RX ADMIN — METRONIDAZOLE 500 MG: 500 INJECTION, SOLUTION INTRAVENOUS at 09:48

## 2021-04-09 RX ADMIN — ATORVASTATIN CALCIUM 80 MG: 40 TABLET, FILM COATED ORAL at 00:47

## 2021-04-09 RX ADMIN — MORPHINE SULFATE 2 MG: 2 INJECTION, SOLUTION INTRAMUSCULAR; INTRAVENOUS at 10:35

## 2021-04-09 RX ADMIN — METRONIDAZOLE 500 MG: 500 INJECTION, SOLUTION INTRAVENOUS at 17:07

## 2021-04-09 RX ADMIN — TRAZODONE HYDROCHLORIDE 100 MG: 100 TABLET ORAL at 00:47

## 2021-04-09 RX ADMIN — MORPHINE SULFATE 2 MG: 2 INJECTION, SOLUTION INTRAMUSCULAR; INTRAVENOUS at 23:03

## 2021-04-09 ASSESSMENT — PAIN DESCRIPTION - DESCRIPTORS
DESCRIPTORS: SHARP

## 2021-04-09 ASSESSMENT — PAIN DESCRIPTION - LOCATION
LOCATION: ABDOMEN
LOCATION: ABDOMEN;CHEST

## 2021-04-09 ASSESSMENT — PAIN DESCRIPTION - PROGRESSION
CLINICAL_PROGRESSION: GRADUALLY WORSENING
CLINICAL_PROGRESSION: GRADUALLY WORSENING

## 2021-04-09 ASSESSMENT — PAIN - FUNCTIONAL ASSESSMENT
PAIN_FUNCTIONAL_ASSESSMENT: PREVENTS OR INTERFERES SOME ACTIVE ACTIVITIES AND ADLS
PAIN_FUNCTIONAL_ASSESSMENT: PREVENTS OR INTERFERES SOME ACTIVE ACTIVITIES AND ADLS

## 2021-04-09 ASSESSMENT — PAIN SCALES - GENERAL
PAINLEVEL_OUTOF10: 5
PAINLEVEL_OUTOF10: 6
PAINLEVEL_OUTOF10: 5
PAINLEVEL_OUTOF10: 6
PAINLEVEL_OUTOF10: 5

## 2021-04-09 ASSESSMENT — PAIN DESCRIPTION - ONSET: ONSET: ON-GOING

## 2021-04-09 ASSESSMENT — PAIN DESCRIPTION - PAIN TYPE
TYPE: ACUTE PAIN
TYPE: ACUTE PAIN

## 2021-04-09 ASSESSMENT — PAIN DESCRIPTION - ORIENTATION: ORIENTATION: LEFT

## 2021-04-09 NOTE — PROGRESS NOTES
Progress Note    Admit Date:  4/8/2021    60M presents with multiple complaints- chronic chest pain since CABG, intermittent cough, and LLQ abdominal pain. Labs revealed DENIA. CT abd showed acute uncomplicated diverticulitis and he was admitted for further care    Subjective:  Mr. Zeb López complains of feeling weak and fatigued     Objective:   Patient Vitals for the past 4 hrs:   BP Temp Temp src Pulse Resp SpO2   04/09/21 0941 120/78 97.4 °F (36.3 °C) Oral 104 16 94 %            Intake/Output Summary (Last 24 hours) at 4/9/2021 1202  Last data filed at 4/9/2021 0956  Gross per 24 hour   Intake 600 ml   Output 425 ml   Net 175 ml       Physical Exam:    Gen: No distress. Alert. Eyes: PERRL. No sclera icterus. No conjunctival injection. ENT: No discharge. Pharynx clear. Neck: No JVD. Trachea midline. Resp: No accessory muscle use. No crackles. No wheezes. No rhonchi. CV: Regular rate. Regular rhythm. No murmur. No rub. No edema. Sternotomy scar noted  Chest wall is TTP  GI: + LLQ tender. Otherwise soft abdomen, no guarding or rigidity. Non-distended. Normal bowel sounds. Skin: Warm and dry. No nodule on exposed extremities. No rash on exposed extremities. M/S: No cyanosis. No joint deformity. No clubbing. Neuro: Awake. Grossly nonfocal    Psych: Oriented x 3. No anxiety or agitation. KALPANA Winslow [facility] and personally interviewed and examined patient, reviewed the data (labs and imaging) and after discussion with my PA formulated the plan. Agree with note with the following edits. Physical exam:    /67   Pulse 71   Temp 97.1 °F (36.2 °C) (Oral)   Resp 16   Ht 5' 4\" (1.626 m)   Wt 285 lb (129.3 kg)   SpO2 94%   BMI 48.92 kg/m²     Gen: No distress. Alert. Eyes: PERRL. No sclera icterus. No conjunctival injection. ENT: No discharge. Pharynx clear. Neck: Trachea midline. Normal thyroid.    Resp: No accessory muscle use. No crackles. No wheezes. No rhonchi. No dullness on percussion. CV: Regular rate. Regular rhythm. No murmur or rub. No edema. Sternotomy scar noted. Ant chest wall tenderness  GI:  LLQ tenderness. Non-distended. No masses. No organomegaly. Normal bowel sounds. No hernia. Skin: Warm and dry. No nodule on exposed extremities. No rash on exposed extremities. Lymph: No cervical LAD. No supraclavicular LAD. M/S: No cyanosis. No joint deformity. No clubbing. Neuro: Awake. Moves all 4 extremities, non focal  Psych: Oriented x 3. No anxiety or agitation. DOUG Muller        Scheduled Meds:   aspirin  81 mg Oral Daily    atorvastatin  80 mg Oral Nightly    clopidogrel  75 mg Oral Daily    ipratropium  0.5 mg Nebulization TID    metoprolol tartrate  25 mg Oral BID    sertraline  100 mg Oral Daily    traZODone  100 mg Oral Nightly    metroNIDAZOLE  500 mg Intravenous Q8H    ciprofloxacin  400 mg Intravenous Q12H    sodium chloride flush  5-40 mL Intravenous 2 times per day    enoxaparin  40 mg Subcutaneous Daily       Continuous Infusions:   sodium chloride      sodium chloride 75 mL/hr at 04/09/21 0632       PRN Meds:  albuterol, sodium chloride flush, sodium chloride, promethazine **OR** ondansetron, polyethylene glycol, acetaminophen **OR** acetaminophen, morphine      Data:  CBC:   Recent Labs     04/08/21 1818 04/09/21  0442   WBC 14.5* 11.0   HGB 13.4* 12.3*   HCT 40.5 37.8*   MCV 87.9 89.2    173     BMP:   Recent Labs     04/08/21 1818 04/09/21  0442    139   K 4.6 4.6    104   CO2 26 25   BUN 62* 59*   CREATININE 2.6* 2.2*     LIVER PROFILE:   Recent Labs     04/08/21 1818 04/09/21  0442   AST 16 13*   ALT 21 16   BILIDIR  --  <0.2   BILITOT 0.5 0.5   ALKPHOS 112 92     PT/INR: No results for input(s): PROTIME, INR in the last 72 hours.     CULTURES  COVID 19: not detected  Influenza a/b: not detected      RADIOLOGY  CT ABDOMEN PELVIS WO CONTRAST Additional Contrast? None   Final Result   Acute, uncomplicated diverticulitis of the sigmoid colon. Nonobstructive left nephrolithiasis. XR CHEST PORTABLE   Final Result   No acute cardiopulmonary disease           Assessment/Plan:    #Acute uncomplicated diverticulitis  - Rocephin and flagyl D#1  - pain control  - clear liquid diet- can advance to low fiber if pain improves     #DENIA  - suspect pre renal with lasix use (dose increased to 80 mg BID by cardio on 3/3/21 2/2 patient's complaints of LEE), as well as intermittent vomiting, acute illness, and use of lisinopril  - no obstruction on CT  - Baseline Crt 0.8 (2/1/21). Crt 2.6 on admit-> 2.2  - Hold lasix, lisinopril  - cont IVF  - monitor BMP    #CAD sp CABG  #Chest pain- suspect MSK etiology   - EKG is not ischemic. Initial trop neg, check one more  - Cont ASA, plavix, toprol, statin  - monitor on tele    #Ischemic Cardiomyopathy  #Chronic systolic CHF  - EF 72-33%  - he is on Lasix 80mg BID at home- held with DENIA  - hold lisinopril with DENIA  - cont toprol  - monitor fluid status closely    #COPD  - stopped smoking after his CABG in Sept 2020  - no exacerbation currently- cont nebulizers    #OANH  - not compliant with CPAP per chart review    #Morbid Obesity  - Body mass index is 48.92 kg/m². - Complicating assessment and treatment. Placing patient at risk for multiple co-morbidities as well as early death and contributing to the patient's presentation.   - Counseled on weight loss. DVT Prophylaxis: Lovenox   Diet: DIET CLEAR LIQUID;  Code Status: Full Code    Argentina Chavarria PA-C  4/9/2021 1:11 PM      Acute uncomplicated diverticulitis. Continue IV Rocephin and Flagyl. Acute kidney injury. Secondary to overdiuresis. Hold Lasix and lisinopril. Continue IV fluids. Coronary disease status post CABG. Chest pain seems musculoskeletal.    EARL Muller.

## 2021-04-09 NOTE — FLOWSHEET NOTE
04/09/21 0941   Vital Signs   Temp 97.4 °F (36.3 °C)   Temp Source Oral   Pulse 104   Heart Rate Source Monitor   Resp 16   /78   BP Location Left lower arm   Level of Consciousness Alert (0)   MEWS Score 2   Oxygen Therapy   SpO2 94 %   O2 Device None (Room air)     Pt. Resting in bed. States he does not feel well. States he just doesn't have any energy and is very weak. Pt. Assessment completed- see flow sheet. Pt. denies further needs at this time. Will continue to monitor. Call light is within reach.   Governor CAMILO Lagos

## 2021-04-09 NOTE — PROGRESS NOTES
Patient admitted to room 317 from er. Patient oriented to room, call light, bed rails, phone, lights and bathroom. Patient instructed about the schedule of the day including: vital sign frequency, lab draws, possible tests, frequency of MD and staff rounds, daily weights, I &O's and prescribed diet. Patients bed alarm in place, patient aware of placement and reason. Telemetry box in place, patient aware of placement and reason. Bed locked, in lowest position, side rails up 2/4, call light within reach. Recliner Assessment  Patient is able to demonstrate the ability to move from a reclining position to an upright position within the recliner. 4 Eyes Skin Assessment     The patient is being assess for   Admission    I agree that 2 RN's have performed a thorough Head to Toe Skin Assessment on the patient. ALL assessment sites listed below have been assessed. Areas assessed by both nurses:   [x]   Head, Face, and Ears   [x]   Shoulders, Back, and Chest, Abdomen  [x]   Arms, Elbows, and Hands   [x]   Coccyx, Sacrum, and Ischium  [x]   Legs, Feet, and Heels            **SHARE this note so that the co-signing nurse is able to place an eSignature**    Co-signer eSignature: Electronically signed by Morelia Benson RN on 4/9/21 at 4:13 AM EDT    Does the Patient have Skin Breakdown?   No          Maxime Prevention initiated:  No   Wound Care Orders initiated:  No      [facility] nurse consulted for Pressure Injury (Stage 3,4, Unstageable, DTI, NWPT, Complex wounds)and New or Established Ostomies:  No      Primary Nurse eSignature: Electronically signed by Evelin Nelson RN on 4/9/21 at 4:19 AM EDT

## 2021-04-09 NOTE — FLOWSHEET NOTE
04/09/21 1938   Vital Signs   Temp 98.2 °F (36.8 °C)   Temp Source Oral   Pulse 88   Heart Rate Source Monitor   Resp 16   /82   BP Location Left lower arm   Level of Consciousness Alert (0)   MEWS Score 1   Pain Assessment   Pain Assessment 0-10   Pain Level 6   Pain Location Abdomen   Pain Orientation Left   Pain Descriptors Sharp   Pain Frequency Continuous   Pain Type Acute pain   Pain Onset On-going   Clinical Progression Gradually worsening   Functional Pain Assessment Prevents or interferes some active activities and ADLs   Oxygen Therapy   SpO2 96 %   O2 Device None (Room air)     Pain as shown above. Bedside report given to [facility]. Pt. denies further needs at this time. Call light is within reach.   Carrol Yang RN

## 2021-04-09 NOTE — PROGRESS NOTES
4 Eyes Skin Assessment     NAME:  Sanjana Arias  YOB: 1960  MEDICAL RECORD NUMBER:  [id number]    The patient is being assess for  Admission    I agree that 2 RN's have performed a thorough Head to Toe Skin Assessment on the patient. ALL assessment sites listed below have been assessed. Areas assessed by both nurses:    Head, Face, Ears, Shoulders, Back, Chest, Arms, Elbows, Hands, Sacrum. Buttock, Coccyx, Ischium and Legs. Feet and Heels        Does the Patient have a Wound?  No noted wound(s)       Maxime Prevention initiated:  No   Wound Care Orders initiated:  No    Pressure Injury (Stage 3,4, Unstageable, DTI, NWPT, and Complex wounds) if present place consult order under [de-identified] No    New and Established Ostomies if present place consult order under : No      Nurse 1 eSignature: Electronically signed by Matt Rivas RN on 4/9/21 at 4:11 AM EDT    **SHARE this note so that the co-signing nurse is able to place an eSignature**    Nurse 2 eSignature: {Esignature:[id number]}

## 2021-04-09 NOTE — ED NOTES
Verbal report given to [address], verbalizes understanding. No questions at this time.  Patient stable out of ER via wheelchair     Van Garcia RN  04/09/21 4409

## 2021-04-09 NOTE — PROGRESS NOTES
MT called stating that pt. Appeared to go into complete heart block and also showed Wenckebach. Strips printed. Pt. Now back in NSR with 1 degree heart block. Dr. Cee britt served. New order for cardiology consult.  Gregor Tellez RN

## 2021-04-09 NOTE — FLOWSHEET NOTE
04/09/21 1035   Pain Assessment   Pain Assessment 0-10   Pain Level 5   Pain Location Abdomen   Pain Orientation Left   Pain Descriptors Sharp   Pain Frequency Continuous   Pain Type Acute pain   Pain Onset On-going   Clinical Progression Gradually worsening   Functional Pain Assessment Prevents or interferes some active activities and ADLs     Pain as seen above. PRN Morphine given per [facility] order. Pt. States he had just recently gotten up and had a BM. Pt. denies further needs at this time. Will continue to monitor. Call light is within reach.   La Scales RN

## 2021-04-09 NOTE — PROGRESS NOTES
Home Treatment Regimen? Yes     Does the patient have everything they need prior to discharge? NA     Comments: patient takes both albuterol and atrovent PRN at home. Plan of Care: home regimen per rsi    Electronically signed by Daryle Solian, RCP on 4/9/2021 at 2:46 PM    Respiratory Protocol Guidelines     1. Assessment and treatment by Respiratory Therapy will be initiated for medication and therapeutic interventions upon initiation of aerosolized medication. 2. Physician will be contacted for respiratory rate (RR) greater than 35 breaths per minute. Therapy will be held for heart rate (HR) greater than 140 beats per minute, pending direction from physician. 3. Bronchodilators will be administered via Metered Dose Inhaler (MDI) with spacer when the following criteria are met:  a. Alert and cooperative     b. HR < 140 bpm  c. RR < 30 bpm                d. Can demonstrate a 2-3 second inspiratory hold  4. Bronchodilators will be administered via Hand Held Nebulizer [facility]) to patients when ANY of the following criteria are met  a. Incognizant or uncooperative          b. Patients treated with HHN at Home        c. Unable to demonstrate proper use of MDI with spacer     d. RR > 30 bpm   5. Bronchodilators will be delivered via Metered Dose Inhaler (MDI), HHN, Aerogen to intubated patients on mechanical ventilation. 6. Inhalation medication orders will be delivered and/or substituted as outlined below. Aerosolized Medications Ordering and Administration Guidelines:    1. All Medications will be ordered by a physician, and their frequency and/or modality will be adjusted as defined by the patients Respiratory Severity Index (RSI) score. 2. If the patient does not have documented COPD, consider discontinuing anticholinergics when RSI is less than 9.  3. If the bronchospasm worsens (increased RSI), then the bronchodilator frequency can be increased to a maximum of every 4 hours.   If greater than every 4 hours is required, the physician will be contacted. 4. If the bronchospasm improves, the frequency of the bronchodilator can be decreased, based on the patient's RSI, but not less than home treatment regimen frequency. 5. Bronchodilator(s) will be discontinued if patient has a RSI less than 9 and has received no scheduled or as needed treatment for 72  Hrs. Patients Ordered on a Mucolytic Agent:    1. Must always be administered with a bronchodilator. 2. Discontinue if patient experiences worsened bronchospasm, or secretions have lessened to the point that the patient is able to clear them with a cough. Anti-inflammatory and Combination Medications:    1. If the patient lacks prior history of lung disease, is not using inhaled anti-inflammatory medication at home, and lacks wheezing by examination or by history for at least 24 hours, contact physician for possible discontinuation.

## 2021-04-09 NOTE — H&P
Hospital Medicine History & Physical      PCP: Alysa Batista, APRN - CNP    Date of Admission: 4/8/2021    Date of Service: Pt seen/examined on 4/8/2021 and Admitted to Inpatient with expected LOS greater than two midnights due to medical therapy. Chief Complaint:  Abd/chest pain, sob      History Of Present Illness:      64 y.o. male presents with several weeks of feeling fatigued, sob with cough productive of yellow sputum. He also complains of intermittent precordial chest pain which worsens with coughing. Denies fever, chills. He noted onset of LLQ abdominal pain 2 days ago. Has had some nausea and vomiting of yellow emesis, (+) diarrhea with dark black stools noted last week which resolved. Patient was advised ER evaluation after calling his Cardiologist today. Currently he is awake, alert oriented, in no respiratory distress on room air.     Past Medical History:          Diagnosis Date    Anxiety 1/6/2020    Aortic valve calcification 09/2020    seen on lung CT    Chronic obstructive pulmonary disease ([facility] [address] 9/3/2019    PFT done 9/03/19    Coronary artery calcification 09/2020    seen on lung ct    Crush injury of right foot 7/23/2015    DDD (degenerative disc disease), lumbar 1/6/2020    Erectile dysfunction 1/6/2020    Fatigue 1/6/2020    History of alcohol abuse 1/6/2020    History of drug use     HNP (herniated nucleus pulposus), lumbar 1/6/2020    Hyperglycemia 1/6/2020    Hypersomnia 1/6/2020    Kidney stone     Lumbar radiculopathy 1/6/2020    Lumbar spine pain 1/6/2020    LVH (left ventricular hypertrophy)     seen on echo 8/2020, moderate    Observed sleep apnea 1/6/2020    Reactive depression 1/6/2020    Spondylosis without myelopathy or radiculopathy, lumbar region 1/6/2020    Tobacco use 1/6/2020       Past Surgical History:          Procedure Laterality Date    CHOLECYSTECTOMY      CORONARY ARTERY BYPASS GRAFT N/A 9/25/2020    CORONARY ARTERY BYPASS GRAFTING 75 MG tablet Take 1 tablet by mouth daily 1/13/21  Yes Brian Squires MD   metoprolol tartrate (LOPRESSOR) 25 MG tablet Take 1 tablet by mouth 2 times daily Hold this medication for pulse <57 or systolic BP <627 11/98/40  Yes ANICETO Malik CNP   ibuprofen (ADVIL;MOTRIN) 800 MG tablet Take 1 tablet by mouth 3 times daily 10/30/20  Yes ANICETO Malik CNP   atorvastatin (LIPITOR) 80 MG tablet Take 1 tablet by mouth nightly 10/28/20  Yes Brian Squires MD   aspirin 81 MG EC tablet Take 1 tablet by mouth daily 9/30/20  Yes ANICETO Villalobos CNP   albuterol (PROVENTIL) (2.5 MG/3ML) 0.083% nebulizer solution Take 3 mLs by nebulization every 6 hours as needed for Wheezing or Shortness of Breath 9/25/19  Yes ANICETO Malik CNP   albuterol sulfate HFA (PROVENTIL HFA) 108 (90 Base) MCG/ACT inhaler Inhale 2 puffs into the lungs every 6 hours as needed for Wheezing 5/17/19  Yes Ryan Cobb MD       Allergies:  Dilaudid [hydromorphone hcl] and Codeine    Social History:         TOBACCO:   reports that he quit smoking about 13 months ago. His smoking use included cigarettes. He has a 70.00 pack-year smoking history. He has never used smokeless tobacco.  ETOH:   reports no history of alcohol use. Family History:               Problem Relation Age of Onset    Heart Disease Mother     Other Mother         copd    Liver Disease Father     High Blood Pressure Sister     No Known Problems Sister        REVIEW OF SYSTEMS:   Pertinent positives as noted in the HPI. All other systems reviewed and negative. PHYSICAL EXAM PERFORMED:    BP (!) 106/58   Pulse 87   Temp 98 °F (36.7 °C) (Oral)   Resp 16   Ht 5' 4\" (1.626 m)   Wt 284 lb (128.8 kg)   SpO2 96%   BMI 48.75 kg/m²     General appearance:  No apparent distress, appears stated age and cooperative. Obese  HEENT:  Normal cephalic, atraumatic without obvious deformity. Pupils equal, round, and reactive to light.   Extra ocular muscles intact. Conjunctivae/corneas clear. Neck: Supple, with full range of motion. No jugular venous distention. Trachea midline. Respiratory:  Normal respiratory effort. No use of accessory muscles, on intercostal retractions, speaking full sentences  Cardiovascular:  Regular rate and rhythm   Abdomen: Soft, non-tender, non-distended , obese  Musculoskeletal:  No clubbing, cyanosis or edema bilaterally. Skin: Skin color, texture, turgor normal.  No rashes or lesions. Neurologic:   grossly non-focal.  Psychiatric:  Alert and oriented, thought content appropriate, normal insight  Capillary Refill: Brisk,< 3 seconds   Peripheral Pulses: +2 palpable, equal bilaterally       Labs:     Recent Labs     04/08/21 1818   WBC 14.5*   HGB 13.4*   HCT 40.5        Recent Labs     04/08/21 1818      K 4.6      CO2 26   BUN 62*   CREATININE 2.6*   CALCIUM 9.2     Recent Labs     04/08/21 1818   AST 16   ALT 21   BILITOT 0.5   ALKPHOS 112     No results for input(s): INR in the last 72 hours. Recent Labs     04/08/21 1818   TROPONINI <0.01       Urinalysis:      Lab Results   Component Value Date    NITRU Negative 04/08/2021    WBCUA 3-5 03/09/2020    BACTERIA 1+ 03/09/2020    RBCUA 5-10 03/09/2020    BLOODU Negative 04/08/2021    SPECGRAV 1.020 04/08/2021    GLUCOSEU Negative 04/08/2021    GLUCOSEU NEGATIVE 03/16/2011       Radiology:          CT ABDOMEN PELVIS WO CONTRAST Additional Contrast? None   Final Result   Acute, uncomplicated diverticulitis of the sigmoid colon. Nonobstructive left nephrolithiasis.          XR CHEST PORTABLE   Final Result   No acute cardiopulmonary disease             ASSESSMENT:    Active Hospital Problems    Diagnosis Date Noted    DENIA (acute kidney injury) ([facility] [address] [N17.9] 04/08/2021         PLAN:    1) Acute diverticulitis  - npo  - ivfs  - prn IV pain, anti emetics  - OV flagyl/cipro    2) DENIA  - holding lasix, lisinopril, advil, likely prerenal  - IVFs    3) CAD  - ekg without acute changes sug of ischem/innjury  - trend trop  - suspect pleuritic cp    DVT Prophylaxis: lovenox  Diet: Diet NPO Effective Now  Code Status: Full Code         Lizeth Barkley MD    Thank you ANICETO Grant CNP for the opportunity to be involved in this patient's care. If you have any questions or concerns please feel free to contact me at [phone].

## 2021-04-09 NOTE — ED NOTES
2022-  was perfect served regarding admission   2046- Dr. Gopal Roman returned call and spoke with Dr. Herman Garza  04/08/21 2023       Jose Todd  04/08/21 2048

## 2021-04-09 NOTE — CARE COORDINATION
Case Management Assessment  Initial Evaluation      Patient Name: Satnam Morse  YOB: 1960  Diagnosis: DENIA (acute kidney injury) [facility]) [N17.9]  Date / Time: 4/8/2021  6:09 PM    Admission status/Date:4/8/2021  Chart Reviewed: Yes      Patient Interviewed: Yes   Family Interviewed:  No      Hospitalization in the last 30 days:  No      Health Care Decision Maker :   Primary Decision Maker: Yulisa Arzate - [facility] - [phone]    (CM - must 1st enter selection under Navigator - emergency contact- Health Care Decision Maker Relationship and pick relationship)   Who do you trust or have selected to make healthcare decisions for you      Met with: pt  Interview conducted  (bedside/phone):bedside    Current PCP:   Ravi required for SNF : Y, N          3 night stay required - Y, N    ADLS  Support Systems/Care Needs: Spouse/Significant Other, Friends/Neighbors  Transportation: self    Meal Preparation: self    Housing  Living Arrangements: lives in 1 story house with spouse  Steps: [facility] for return to present living arrangements: Yes  Identified Issues:     Home Care Information  Active with [address] : No Agency:(Services)  Type of Home Care Services: None  Passport/Waiver : No  :                      Phone Number:    Passport/Waiver Services:           Durable Medical Equiptment   DME Provider: none  Equipment:   Walker___Cane___RTS___ BSC___Shower Chair___Hospital Bed___W/C____Other________  02 at _x-prn___Liter(s)---wears(frequency)_______ HHN ___ CPAP___ BiPap___   N/A____      Home O2 Use :  Yes-prn on RA now    If No for home O2---if presently on O2 during hospitalization:  No     Community Service Affiliation  Dialysis:  No    · Agency:  · Location:  · Dialysis Schedule:  · Phone:   · Fax:     Other Community Services: (ex:PT/OT,Mental Health,Wound Clinic, [address] [place] [address] [place])    DISCHARGE PLAN: Explained Case

## 2021-04-09 NOTE — PROGRESS NOTES
Physician Progress Note      PATIENT:               Viji Chapin  CSN #:                  [id number]  :                       1960  ADMIT DATE:       2021 6:09 PM  [address] DATE:  RESPONDING  PROVIDER #:        Les Benz MD          QUERY TEXT:    Pt admitted with abdominal and chest pain. If possible, please document in   progress notes and discharge summary if you are evaluating and /or treating   any of the following: The medical record reflects the following:  Risk Factors: diverticulitis, DENIA, CAD  Clinical Indicators: ED- ED complaining of generalized malaise, shortness of   breath with productive cough, nausea, and chest pain. He also states that he   is having some left lower quadrant abdominal pain over this time as well that   he describes as crampy in nature. Treatment: ORN IV pain medication, holding lasix, lisinopril, advil, IVFs,   monitor labs/images, flagyl, cipro    Thank You Deon Lorenzo RN, CDS [email or web address]  Options provided:  -- Abdominal and chest pain due to diverticulitis  -- Abdominal and chest pain due to DENIA  -- Abdominal and chest pain due to CAD  -- Abdominal and chest pain due to please document, please document. -- Other - I will add my own diagnosis  -- Disagree - Not applicable / Not valid  -- Disagree - Clinically unable to determine / Unknown  -- Refer to Clinical Documentation Reviewer    PROVIDER RESPONSE TEXT:    This patient has abdominal and chest pain due to abd pain 2 to   diverticulitis. chest pain is musculoskeletal.    Query created by: Cynthia Castaneda on 2021 1:19 PM      Electronically signed by:  Les Benz MD 2021 2:27 PM

## 2021-04-10 PROBLEM — I25.119 CORONARY ARTERY DISEASE INVOLVING NATIVE HEART WITH ANGINA PECTORIS (HCC): Status: ACTIVE | Noted: 2020-09-22

## 2021-04-10 LAB
ANION GAP SERPL CALCULATED.3IONS-SCNC: 7 MMOL/L (ref 3–16)
BASOPHILS ABSOLUTE: 0.1 K/UL (ref 0–0.2)
BASOPHILS RELATIVE PERCENT: 1.4 %
BUN BLDV-MCNC: 28 MG/DL (ref 7–20)
CALCIUM SERPL-MCNC: 9.5 MG/DL (ref 8.3–10.6)
CHLORIDE BLD-SCNC: 102 MMOL/L (ref 99–110)
CO2: 27 MMOL/L (ref 21–32)
CREAT SERPL-MCNC: 1.1 MG/DL (ref 0.9–1.3)
EOSINOPHILS ABSOLUTE: 0.3 K/UL (ref 0–0.6)
EOSINOPHILS RELATIVE PERCENT: 3.5 %
GFR AFRICAN AMERICAN: >60
GFR NON-AFRICAN AMERICAN: >60
GLUCOSE BLD-MCNC: 97 MG/DL (ref 70–99)
HCT VFR BLD CALC: 41.8 % (ref 40.5–52.5)
HEMOGLOBIN: 13.8 G/DL (ref 13.5–17.5)
LYMPHOCYTES ABSOLUTE: 2.3 K/UL (ref 1–5.1)
LYMPHOCYTES RELATIVE PERCENT: 22.6 %
MCH RBC QN AUTO: 29.4 PG (ref 26–34)
MCHC RBC AUTO-ENTMCNC: 32.9 G/DL (ref 31–36)
MCV RBC AUTO: 89.3 FL (ref 80–100)
MONOCYTES ABSOLUTE: 0.6 K/UL (ref 0–1.3)
MONOCYTES RELATIVE PERCENT: 6.4 %
NEUTROPHILS ABSOLUTE: 6.6 K/UL (ref 1.7–7.7)
NEUTROPHILS RELATIVE PERCENT: 66.1 %
PDW BLD-RTO: 18.3 % (ref 12.4–15.4)
PLATELET # BLD: 193 K/UL (ref 135–450)
PMV BLD AUTO: 8 FL (ref 5–10.5)
POTASSIUM REFLEX MAGNESIUM: 4.9 MMOL/L (ref 3.5–5.1)
RBC # BLD: 4.68 M/UL (ref 4.2–5.9)
SODIUM BLD-SCNC: 136 MMOL/L (ref 136–145)
WBC # BLD: 10 K/UL (ref 4–11)

## 2021-04-10 PROCEDURE — 85025 COMPLETE CBC W/AUTO DIFF WBC: CPT

## 2021-04-10 PROCEDURE — 36415 COLL VENOUS BLD VENIPUNCTURE: CPT

## 2021-04-10 PROCEDURE — 6370000000 HC RX 637 (ALT 250 FOR IP): Performed by: HOSPITALIST

## 2021-04-10 PROCEDURE — 6360000002 HC RX W HCPCS: Performed by: HOSPITALIST

## 2021-04-10 PROCEDURE — 99232 SBSQ HOSP IP/OBS MODERATE 35: CPT | Performed by: INTERNAL MEDICINE

## 2021-04-10 PROCEDURE — 6360000002 HC RX W HCPCS: Performed by: PHYSICIAN ASSISTANT

## 2021-04-10 PROCEDURE — 2500000003 HC RX 250 WO HCPCS: Performed by: HOSPITALIST

## 2021-04-10 PROCEDURE — 2060000000 HC ICU INTERMEDIATE R&B

## 2021-04-10 PROCEDURE — 2580000003 HC RX 258: Performed by: HOSPITALIST

## 2021-04-10 PROCEDURE — 80048 BASIC METABOLIC PNL TOTAL CA: CPT

## 2021-04-10 PROCEDURE — 2580000003 HC RX 258: Performed by: PHYSICIAN ASSISTANT

## 2021-04-10 PROCEDURE — 99254 IP/OBS CNSLTJ NEW/EST MOD 60: CPT | Performed by: INTERNAL MEDICINE

## 2021-04-10 RX ADMIN — CLOPIDOGREL BISULFATE 75 MG: 75 TABLET ORAL at 09:39

## 2021-04-10 RX ADMIN — ATORVASTATIN CALCIUM 80 MG: 40 TABLET, FILM COATED ORAL at 20:04

## 2021-04-10 RX ADMIN — METRONIDAZOLE 500 MG: 500 INJECTION, SOLUTION INTRAVENOUS at 16:33

## 2021-04-10 RX ADMIN — MORPHINE SULFATE 2 MG: 2 INJECTION, SOLUTION INTRAMUSCULAR; INTRAVENOUS at 20:04

## 2021-04-10 RX ADMIN — MORPHINE SULFATE 2 MG: 2 INJECTION, SOLUTION INTRAMUSCULAR; INTRAVENOUS at 15:17

## 2021-04-10 RX ADMIN — TRAZODONE HYDROCHLORIDE 100 MG: 100 TABLET ORAL at 20:04

## 2021-04-10 RX ADMIN — Medication 10 ML: at 09:02

## 2021-04-10 RX ADMIN — ENOXAPARIN SODIUM 40 MG: 40 INJECTION SUBCUTANEOUS at 09:39

## 2021-04-10 RX ADMIN — METRONIDAZOLE 500 MG: 500 INJECTION, SOLUTION INTRAVENOUS at 09:02

## 2021-04-10 RX ADMIN — ONDANSETRON 4 MG: 2 INJECTION INTRAMUSCULAR; INTRAVENOUS at 05:46

## 2021-04-10 RX ADMIN — Medication 10 ML: at 20:05

## 2021-04-10 RX ADMIN — ACETAMINOPHEN 650 MG: 325 TABLET ORAL at 09:39

## 2021-04-10 RX ADMIN — MORPHINE SULFATE 2 MG: 2 INJECTION, SOLUTION INTRAMUSCULAR; INTRAVENOUS at 05:45

## 2021-04-10 RX ADMIN — METRONIDAZOLE 500 MG: 500 INJECTION, SOLUTION INTRAVENOUS at 01:28

## 2021-04-10 RX ADMIN — SERTRALINE 100 MG: 100 TABLET, FILM COATED ORAL at 09:39

## 2021-04-10 RX ADMIN — CEFTRIAXONE SODIUM 1000 MG: 1 INJECTION, POWDER, FOR SOLUTION INTRAMUSCULAR; INTRAVENOUS at 15:18

## 2021-04-10 RX ADMIN — ASPIRIN 81 MG: 81 TABLET, COATED ORAL at 09:39

## 2021-04-10 ASSESSMENT — PAIN DESCRIPTION - LOCATION
LOCATION: HEAD
LOCATION: ABDOMEN
LOCATION: ABDOMEN

## 2021-04-10 ASSESSMENT — PAIN DESCRIPTION - ORIENTATION
ORIENTATION: LOWER;LEFT
ORIENTATION: LEFT;LOWER

## 2021-04-10 ASSESSMENT — PAIN DESCRIPTION - PROGRESSION
CLINICAL_PROGRESSION: NOT CHANGED
CLINICAL_PROGRESSION: GRADUALLY IMPROVING

## 2021-04-10 ASSESSMENT — PAIN SCALES - GENERAL
PAINLEVEL_OUTOF10: 4
PAINLEVEL_OUTOF10: 6
PAINLEVEL_OUTOF10: 2
PAINLEVEL_OUTOF10: 5
PAINLEVEL_OUTOF10: 5

## 2021-04-10 ASSESSMENT — PAIN DESCRIPTION - DESCRIPTORS: DESCRIPTORS: ACHING

## 2021-04-10 ASSESSMENT — PAIN DESCRIPTION - PAIN TYPE
TYPE: ACUTE PAIN
TYPE: ACUTE PAIN

## 2021-04-10 NOTE — CONSULTS
[address] [facility]  [phone]        Reason for Consultation/Chief Complaint: \"I have been having SOB, CP, abdominal pain, N/V .\"  Consulted for rhythm changes    History of Present Illness:  Lois Garcia is a 64 y.o. patient who presented to [facility] 4/8/21 with c/o SOB, CP, abdominal pain, and N/V. Normally sees Dr. Cindy Barkley for cardiology--last OV 3/3/21. He has PMH of CAD s/p 2V CABG (LIMA-LAD, SVG-OM, and SVG-diagonal) in 9/20, COPD, anxiety, kidney stones, OANH, and hx ETOH/drug use. Most recent ECHO 3/12/21 showed EF-45-50% (EF=55-60% in 9/20); more prominent inferior HK; indeterminate diastolic function. Most recent [address] [place] [address] 9/22//20 showed large area ischemia in LAD and CCx territories; EF=50%; inferolateral HK. Most recent carotid doppler 9/20 <50% stenoses bilaterally. He presented in Sept 2020 with [place] and underwent devon nuc study which was abnormal leading to cath and eventual CABG. Now presents with multiple complaints including SOB, CP, abdominal pain, and N/V. Reports not feeling good since CABG with fatigue. Most recently with increased LEE and also left abdominal pain with N/V/D. Reports N/V has been chronic since CABG as well. Reports CP to touch of chest wall around incision. At last OV with Dr. Cindy Barkley his lasix was increased to 80mg BID for concerns of CHF with weight gain. Also had lisinopril increased to 20mg qd. Admit EKG NSR 91bpm; LV; nonspecific ST change; inferior infarct (compared to 10/20 EKG minimal criteria now present for inferior infarct). Note 2 negative Mary; BUN/Cr=63/2.6. Note CT abd/pelvis + for acute sigmoid diverticulitis; nonobstructing left kidney stone. Note tele strips reviewed showing Mobitz I Wenckebach and short asystole with 3 P waves and no QRS complexes. Patient with no complaints of chest pain, SOB, palpitations, dizziness, edema, or orthopnea/PND.  I have been asked to provide consultation regarding further management and testing. Past Medical History:   has a past medical history of Anxiety, Aortic valve calcification, Chronic obstructive pulmonary disease ([address] Coronary artery calcification, Crush injury of right foot, DDD (degenerative disc disease), lumbar, Erectile dysfunction, Fatigue, History of alcohol abuse, History of drug use, HNP (herniated nucleus pulposus), lumbar, Hyperglycemia, Hypersomnia, Kidney stone, Lumbar radiculopathy, Lumbar spine pain, LVH (left ventricular hypertrophy), Observed sleep apnea, Reactive depression, Spondylosis without myelopathy or radiculopathy, lumbar region, and Tobacco use. Surgical History:   has a past surgical history that includes Cholecystectomy; Cystoscopy (03/16/2011); Pain management procedure (Right, 12/24/2019); Pain management procedure (Right, 1/7/2020); Coronary artery bypass graft (N/A, 9/25/2020); and Finger amputation (Right, 2/2/2021). Social History:   reports that he quit smoking about 13 months ago. His smoking use included cigarettes. He has a 70.00 pack-year smoking history. He has never used smokeless tobacco. He reports previous drug use. He reports that he does not drink alcohol. Family History:  family history includes Heart Disease in his mother; High Blood Pressure in his sister; Liver Disease in his father; No Known Problems in his sister; Other in his mother. Home Medications:  Were reviewed and are listed in nursing record. and/or listed below  Prior to Admission medications    Medication Sig Start Date End Date Taking?  Authorizing Provider   lisinopril (PRINIVIL;ZESTRIL) 20 MG tablet Take 0.5 tablets by mouth daily 3/15/21  Yes Torres Alexander MD   furosemide (LASIX) 80 MG tablet Take 1 tablet by mouth 2 times daily 3/3/21 3/3/22 Yes Torres Alexander MD   ipratropium (ATROVENT) 0.02 % nebulizer solution Take 2.5 mLs by nebulization 3 times daily 3/2/21  Yes ANICETO Butt CNP   Handicap Placard MISC by Does not apply route Duration - 5 years 3/1/21  Yes ANICETO Lang CNP   traZODone (DESYREL) 100 MG tablet Take 1 tablet by mouth nightly  Patient taking differently: Take 100 mg by mouth 2 times daily Am pm 3/1/21  Yes ANICETO Lang CNP   sertraline (ZOLOFT) 100 MG tablet Take 1 tablet by mouth daily 3/1/21  Yes ANICETO Lang CNP   acetaminophen (TYLENOL) 500 MG tablet Take 2 tablets by mouth 3 times daily for 10 days 1/22/21 4/8/21 Yes Westley Paniagua MD   clopidogrel (PLAVIX) 75 MG tablet Take 1 tablet by mouth daily 1/13/21  Yes Lori Haynes MD   metoprolol tartrate (LOPRESSOR) 25 MG tablet Take 1 tablet by mouth 2 times daily Hold this medication for pulse <31 or systolic BP <464 93/31/57  Yes ANICETO Lang CNP   ibuprofen (ADVIL;MOTRIN) 800 MG tablet Take 1 tablet by mouth 3 times daily 10/30/20  Yes ANICETO Lang CNP   atorvastatin (LIPITOR) 80 MG tablet Take 1 tablet by mouth nightly 10/28/20  Yes Lori Haynes MD   aspirin 81 MG EC tablet Take 1 tablet by mouth daily 9/30/20  Yes ANICETO Mcrae CNP   albuterol (PROVENTIL) (2.5 MG/3ML) 0.083% nebulizer solution Take 3 mLs by nebulization every 6 hours as needed for Wheezing or Shortness of Breath 9/25/19  Yes ANICETO Lang CNP   albuterol sulfate HFA (PROVENTIL HFA) 108 (90 Base) MCG/ACT inhaler Inhale 2 puffs into the lungs every 6 hours as needed for Wheezing 5/17/19  Yes Minesh Alexander MD        Allergies:  Dilaudid [hydromorphone hcl] and Codeine     Review of Systems:   12 point ROS negative in all areas as listed below except as in [address]  Constitutional, EENT, Cardiovascular, pulmonary, GI, , Musculoskeletal, skin, neurological, hematological, endocrine, Psychiatric    Physical Examination:    Vitals:    04/10/21 0032   BP: 122/68   Pulse: 85   Resp: 16   Temp: 97.7 °F (36.5 °C)   SpO2: 94%    Weight: 283 lb 8 oz (128.6 kg)         General Appearance:  Alert, cooperative, no distress, appears stated age   Head:  Normocephalic, without obvious abnormality, atraumatic   Eyes:  PERRL, conjunctiva/corneas clear       Nose: Nares normal, no drainage or sinus tenderness   Throat: Lips, mucosa, and tongue normal   Neck: Supple, symmetrical, trachea midline, no adenopathy, thyroid: not enlarged, symmetric, no tenderness/mass/nodules, no carotid bruit or JVD       Lungs:   Clear to auscultation bilaterally, respirations unlabored   Chest Wall:  +tender to palpation   Heart:  Regular rate and rhythm, S1, S2 normal, no murmur, rub or gallop   Abdomen:   Soft, non-tender, bowel sounds active all four quadrants,  no masses, no organomegaly           Extremities: Extremities normal, atraumatic, no cyanosis or edema   Pulses: 2+ and symmetric   Skin: Skin color, texture, turgor normal, no rashes or lesions   Pysch: Normal mood and affect   Neurologic: Normal gross motor and sensory exam.         Labs  CBC:   Lab Results   Component Value Date    WBC 10.0 04/10/2021    RBC 4.68 04/10/2021    HGB 13.8 04/10/2021    HCT 41.8 04/10/2021    MCV 89.3 04/10/2021    RDW 18.3 04/10/2021     04/10/2021     CMP:    Lab Results   Component Value Date     04/10/2021    K 4.9 04/10/2021     04/10/2021    CO2 27 04/10/2021    BUN 28 04/10/2021    CREATININE 1.1 04/10/2021    GFRAA >60 04/10/2021    GFRAA >60 03/16/2011    AGRATIO 1.3 04/08/2021    LABGLOM >60 04/10/2021    GLUCOSE 97 04/10/2021    PROT 6.3 04/09/2021    PROT 6.9 03/16/2011    CALCIUM 9.5 04/10/2021    BILITOT 0.5 04/09/2021    ALKPHOS 92 04/09/2021    AST 13 04/09/2021    ALT 16 04/09/2021     PT/INR:  No results found for: PTINR  Lab Results   Component Value Date    TROPONINI <0.01 04/09/2021       EKG:  I have reviewed EKG with the following interpretation:  Impression:  See HPI    Limited ECHO 9/29/20   Summary   Limited only f/u for LVEF post op CABG   Technically difficult examination.   Normal left ventricular systolic function with ejection fraction of 55-60%.   No regional wall motion abnormalites are seen.   Compared to previous study from 8- no changes noted in left   ventricular function.     Lexiscan 9/22/20   Summary     Increase left ventricular systolic volume with hypokinesis/decreased     myocardial thickening of the inferolateral segment. Large area of inducible     ischemia of the LAD and left circumflex territories. Post-stress LVEF normal     at 56%.     Overall findings represent highly abnormal study, high risk scan.          [facility] 9/22/20  1. Left main coronary artery has moderate calcific disease. It gave off the left anterior descending artery and left circumflex.     2. Left anterior descending artery has severe atherosclerotic disease.  It was moderate in size. It gave off a large early diagonal branch that has ostial 90%. The LAD covered the entire apex of the left ventricle.      3. Left circumflex has severe atherosclerotic disease.  It was moderate in size. There was a moderate sized obtuse marginal branch that is occluded.     4. Right coronary artery has mild atherosclerotic disease. It was large in size and was the dominant artery.             ~there is faint collaterals to the LAD     5. Left ventriculogram showed normal LVEF at 55-60%. Wall motion was normal . There was no significant mitral valve or aortic valve disease noted. LVEDP was normal. There was no gradient noted across the aortic valve during pullback of the catheter.       3/12/21 ECHO Summary   Limited echo for left ventricle systolic function. Definity is used for   myocardial border enhancement. LV systolic function is mildly reduced with a visually estimated EF=45-50%. The left ventricle is normal in size with normal wall thickness. More prominent Inferior HK on certain views. Indeterminate diastolic function.     Assessment:  Mariama Guzman is a 64 y.o. patient who presented to [facility] 4/8/21 with c/o SOB, CP, abdominal pain, and N/V. Normally sees Dr. Eunice Hughes for cardiology--last OV 3/3/21. He has PMH of CAD s/p 2V CABG (LIMA-LAD, SVG-OM, and SVG-diagonal) in 9/20, COPD, anxiety, kidney stones, OANH, and hx ETOH/drug use. Most recent ECHO 3/12/21 showed EF-45-50% (EF=55-60% in 9/20); more prominent inferior HK; indeterminate diastolic function. Most recent [address] 9/22//20 showed large area ischemia in LAD and CCx territories; EF=50%; inferolateral HK. Most recent carotid doppler 9/20 <50% stenoses bilaterally. He presented in Sept 2020 with [place] and underwent devon nuc study which was abnormal leading to cath and eventual CABG. Now presents with multiple complaints including SOB, CP, abdominal pain, and N/V. Reports not feeling good since CABG with fatigue. Most recently with increased LEE and also left abdominal pain with N/V/D. Reports N/V has been chronic since CABG as well. Reports CP to touch of chest wall around incision. At last OV with Dr. Eunice Hughes his lasix was increased to 80mg BID for concerns of CHF with weight gain. Also had lisinopril increased to 20mg qd. Admit EKG NSR 91bpm; LV; nonspecific ST change; inferior infarct (compared to 10/20 EKG minimal criteria now present for inferior infarct). Note 2 negative Mary; BUN/Cr=63/2.6. Note CT abd/pelvis + for acute sigmoid diverticulitis; nonobstructing left kidney stone. Note tele strips reviewed showing Mobitz I Wenckebach and short asystole with 3 P waves and no QRS complexes. Diagnosis of unspecified chest pain, Mobitz I Wenckebach, and short period of asystole in middle-aged male with acute diverticulitis and ARF likely due to increased diuretic and ACE-I dosing. Hx mild ischemic CM and acute systolic CHF prior. Recs:  1. Agree with holding lasix, lisinopril, and metoprolol. .  2. Watch telemetry off beta blocker and with improved renal function. 3. Treat diverticulitis appropriately.   4. Will consider ischemia w/u once medically improved from renal and diverticulitis issues. Patient Active Problem List   Diagnosis    Crush injury of right foot    Shortness of breath    Chest pain    Erectile dysfunction    Hyperglycemia    Anxiety    Depression    Tobacco abuse    History of alcohol abuse    Fatigue    OANH (obstructive sleep apnea)    Hypersomnia    Chronic obstructive pulmonary disease (HCC)    Lumbar radiculopathy    HNP (herniated nucleus pulposus), lumbar    Spondylosis without myelopathy or radiculopathy, lumbar region    DDD (degenerative disc disease), lumbar    Lumbar spine pain    Acute respiratory distress    Class 3 severe obesity with body mass index (BMI) of 45.0 to 49.9 in adult ([address]    Pneumonia, primary atypical    Acute respiratory failure with hypoxia ([address]    Moderate protein-calorie malnutrition (HCC)    Acute respiratory failure ([address]    Acute on chronic respiratory failure with hypoxemia (HCC)    Acute diastolic congestive heart failure (HCC)    Hyperlipidemia    Insomnia    Morbid obesity with BMI of 45.0-49.9, adult (HCC)    Abnormal cardiovascular stress test    Coronary artery disease involving native coronary artery of native heart without angina pectoris    S/P three vessel coronary artery bypass    Acute kidney injury ([address]    Acute diverticulitis    Chronic systolic congestive heart failure ([address]       Thank you for allowing to us to participate in the care or [employer]. Further evaluation will be based upon the patient's clinical course and testing results.

## 2021-04-10 NOTE — PROGRESS NOTES
Pt is lying in bed with their eyes closed. Respirations are easy and even. Call light within reach bed in lowest position with the wheels locked. Will continue to monitor.  Jan Sicks

## 2021-04-10 NOTE — PROGRESS NOTES
Patient rests in bed with complaints of generally feeling unwell. He has tenderness to his chest wall with touch. He verbalizes feeling very tired and weak. He denies any nausea at this time but states that he has nausea every morning and sometimes through the day. He sometimes has some vomiting. He verbalizes LLQ abdominal pain which is improved at this time. Discussed telemetry strips with Dr. Anayeli Gregory.

## 2021-04-10 NOTE — FLOWSHEET NOTE
04/10/21 1500   Vitals   Temp 97.7 °F (36.5 °C)   Temp Source Oral   Pulse 79   Heart Rate Source Monitor   Resp 18   /72   BP Location Left upper arm   Level of Consciousness Alert (0)   MEWS Score 1   Oxygen Therapy   SpO2 93 %   O2 Device Nasal cannula

## 2021-04-10 NOTE — PROGRESS NOTES
Progress Note    Admit Date:  4/8/2021    60M presents with multiple complaints- chronic chest pain since CABG, intermittent cough, and LLQ abdominal pain. Labs revealed DENIA. CT abd showed acute uncomplicated diverticulitis and he was admitted for further care    Subjective:  Mr. Yarelis Medellin continues to complain of feeling weak and fatigued   abd pain same  Episodes of transient 3 degree heart block  And 2 degree heart block on monitor    Objective:   No data found. Intake/Output Summary (Last 24 hours) at 4/10/2021 0739  Last data filed at 4/10/2021 0558  Gross per 24 hour   Intake 2625 ml   Output 1425 ml   Net 1200 ml       Physical Exam:    Gen: No distress. Alert. Eyes: PERRL. No sclera icterus. No conjunctival injection. ENT: No discharge. Pharynx clear. Neck: No JVD. Trachea midline. Resp: No accessory muscle use. No crackles. No wheezes. No rhonchi. CV: Regular rate. Regular rhythm. No murmur. No rub. No edema. Sternotomy scar noted  Chest wall is TTP  GI:  Mild + LLQ tender. Otherwise soft abdomen, no guarding or rigidity. Non-distended. Normal bowel sounds. Skin: Warm and dry. No nodule on exposed extremities. No rash on exposed extremities. M/S: No cyanosis. No joint deformity. No clubbing. Neuro: Awake. Grossly nonfocal    Psych: Oriented x 3. No anxiety or agitation.          Scheduled Meds:   aspirin  81 mg Oral Daily    atorvastatin  80 mg Oral Nightly    clopidogrel  75 mg Oral Daily    [Held by provider] metoprolol tartrate  25 mg Oral BID    sertraline  100 mg Oral Daily    traZODone  100 mg Oral Nightly    metroNIDAZOLE  500 mg Intravenous Q8H    sodium chloride flush  5-40 mL Intravenous 2 times per day    enoxaparin  40 mg Subcutaneous Daily    cefTRIAXone (ROCEPHIN) IV  1,000 mg Intravenous Q24H       Continuous Infusions:   sodium chloride      sodium chloride 75 mL/hr at 04/09/21 1711       PRN Meds:  albuterol, sodium chloride flush, sodium chloride, promethazine **OR** ondansetron, polyethylene glycol, acetaminophen **OR** acetaminophen, morphine, ipratropium      Data:  CBC:   Recent Labs     04/08/21 1818 04/09/21 0442 04/10/21  0523   WBC 14.5* 11.0 10.0   HGB 13.4* 12.3* 13.8   HCT 40.5 37.8* 41.8   MCV 87.9 89.2 89.3    173 193     BMP:   Recent Labs     04/08/21 1818 04/09/21  0442 04/10/21  0523    139 136   K 4.6 4.6 4.9    104 102   CO2 26 25 27   BUN 62* 59* 28*   CREATININE 2.6* 2.2* 1.1     LIVER PROFILE:   Recent Labs     04/08/21 1818 04/09/21 0442   AST 16 13*   ALT 21 16   BILIDIR  --  <0.2   BILITOT 0.5 0.5   ALKPHOS 112 92     PT/INR: No results for input(s): PROTIME, INR in the last 72 hours. CULTURES  COVID 19: not detected  Influenza a/b: not detected      RADIOLOGY  CT ABDOMEN PELVIS WO CONTRAST Additional Contrast? None   Final Result   Acute, uncomplicated diverticulitis of the sigmoid colon. Nonobstructive left nephrolithiasis. XR CHEST PORTABLE   Final Result   No acute cardiopulmonary disease           Assessment/Plan:    #Acute uncomplicated diverticulitis  - Rocephin and flagyl D#2  - pain control  - clear liquid diet- can advance to low fiber if pain improves     #DENIA- resolved   - suspect pre renal with lasix use (dose increased to 80 mg BID by cardio on 3/3/21 2/2 patient's complaints of LEE), as well as intermittent vomiting, acute illness, and use of lisinopril  - no obstruction on CT  IVF  - Baseline Crt 0.8 (2/1/21). Crt 2.6 on admit-> 2.2-- 1.1  - Hold lasix, lisinopril  D/c IVF     #CAD sp CABG  #Chest pain- suspect MSK etiology   - EKG is not ischemic.   Initial trop neg, check one more  - Cont ASA, plavix,  statin  - monitor on tele  Hold toprol given heart block periodically seen on monitor     #Ischemic Cardiomyopathy  #Chronic systolic CHF  - EF 51-91%  - he is on Lasix 80mg BID at home- held with DENIA  - hold lisinopril with DENIA  - cont toprol  - monitor fluid status closely  Hold toprol    #COPD  - stopped smoking after his CABG in Sept 2020  - no exacerbation currently- cont nebulizers    #OANH  - not compliant with CPAP per chart review    #Morbid Obesity  - Body mass index is 48.66 kg/m². - Complicating assessment and treatment. Placing patient at risk for multiple co-morbidities as well as early death and contributing to the patient's presentation.   - Counseled on weight loss. DVT Prophylaxis: Lovenox   Diet: Diet NPO, After Midnight  Code Status: Full Code        EARL Muller.

## 2021-04-11 LAB
ANION GAP SERPL CALCULATED.3IONS-SCNC: 6 MMOL/L (ref 3–16)
BASOPHILS ABSOLUTE: 0.1 K/UL (ref 0–0.2)
BASOPHILS RELATIVE PERCENT: 0.7 %
BUN BLDV-MCNC: 14 MG/DL (ref 7–20)
CALCIUM SERPL-MCNC: 9.1 MG/DL (ref 8.3–10.6)
CHLORIDE BLD-SCNC: 103 MMOL/L (ref 99–110)
CO2: 29 MMOL/L (ref 21–32)
CREAT SERPL-MCNC: 1 MG/DL (ref 0.9–1.3)
EOSINOPHILS ABSOLUTE: 0.3 K/UL (ref 0–0.6)
EOSINOPHILS RELATIVE PERCENT: 3.2 %
GFR AFRICAN AMERICAN: >60
GFR NON-AFRICAN AMERICAN: >60
GLUCOSE BLD-MCNC: 103 MG/DL (ref 70–99)
HCT VFR BLD CALC: 37.8 % (ref 40.5–52.5)
HCT VFR BLD CALC: 38.2 % (ref 40.5–52.5)
HEMOGLOBIN: 12.7 G/DL (ref 13.5–17.5)
HEMOGLOBIN: 12.8 G/DL (ref 13.5–17.5)
LYMPHOCYTES ABSOLUTE: 1.9 K/UL (ref 1–5.1)
LYMPHOCYTES RELATIVE PERCENT: 17.9 %
MCH RBC QN AUTO: 29.5 PG (ref 26–34)
MCH RBC QN AUTO: 29.6 PG (ref 26–34)
MCHC RBC AUTO-ENTMCNC: 33.5 G/DL (ref 31–36)
MCHC RBC AUTO-ENTMCNC: 33.5 G/DL (ref 31–36)
MCV RBC AUTO: 88.2 FL (ref 80–100)
MCV RBC AUTO: 88.4 FL (ref 80–100)
MONOCYTES ABSOLUTE: 0.7 K/UL (ref 0–1.3)
MONOCYTES RELATIVE PERCENT: 6.2 %
NEUTROPHILS ABSOLUTE: 7.6 K/UL (ref 1.7–7.7)
NEUTROPHILS RELATIVE PERCENT: 72 %
OCCULT BLOOD DIAGNOSTIC: ABNORMAL
PDW BLD-RTO: 17.5 % (ref 12.4–15.4)
PDW BLD-RTO: 17.8 % (ref 12.4–15.4)
PLATELET # BLD: 162 K/UL (ref 135–450)
PLATELET # BLD: 177 K/UL (ref 135–450)
PMV BLD AUTO: 7.6 FL (ref 5–10.5)
PMV BLD AUTO: 8 FL (ref 5–10.5)
POTASSIUM SERPL-SCNC: 4.5 MMOL/L (ref 3.5–5.1)
RBC # BLD: 4.27 M/UL (ref 4.2–5.9)
RBC # BLD: 4.33 M/UL (ref 4.2–5.9)
SODIUM BLD-SCNC: 138 MMOL/L (ref 136–145)
WBC # BLD: 10.6 K/UL (ref 4–11)
WBC # BLD: 8.9 K/UL (ref 4–11)

## 2021-04-11 PROCEDURE — 80048 BASIC METABOLIC PNL TOTAL CA: CPT

## 2021-04-11 PROCEDURE — 85027 COMPLETE CBC AUTOMATED: CPT

## 2021-04-11 PROCEDURE — 6360000002 HC RX W HCPCS: Performed by: PHYSICIAN ASSISTANT

## 2021-04-11 PROCEDURE — 36415 COLL VENOUS BLD VENIPUNCTURE: CPT

## 2021-04-11 PROCEDURE — 6370000000 HC RX 637 (ALT 250 FOR IP): Performed by: HOSPITALIST

## 2021-04-11 PROCEDURE — 85025 COMPLETE CBC W/AUTO DIFF WBC: CPT

## 2021-04-11 PROCEDURE — 2500000003 HC RX 250 WO HCPCS: Performed by: HOSPITALIST

## 2021-04-11 PROCEDURE — 2580000003 HC RX 258: Performed by: HOSPITALIST

## 2021-04-11 PROCEDURE — 99232 SBSQ HOSP IP/OBS MODERATE 35: CPT | Performed by: INTERNAL MEDICINE

## 2021-04-11 PROCEDURE — 6360000002 HC RX W HCPCS: Performed by: HOSPITALIST

## 2021-04-11 PROCEDURE — 2060000000 HC ICU INTERMEDIATE R&B

## 2021-04-11 PROCEDURE — 99233 SBSQ HOSP IP/OBS HIGH 50: CPT | Performed by: INTERNAL MEDICINE

## 2021-04-11 PROCEDURE — G0328 FECAL BLOOD SCRN IMMUNOASSAY: HCPCS

## 2021-04-11 PROCEDURE — 2580000003 HC RX 258: Performed by: PHYSICIAN ASSISTANT

## 2021-04-11 RX ADMIN — MORPHINE SULFATE 2 MG: 2 INJECTION, SOLUTION INTRAMUSCULAR; INTRAVENOUS at 19:49

## 2021-04-11 RX ADMIN — ENOXAPARIN SODIUM 40 MG: 40 INJECTION SUBCUTANEOUS at 07:47

## 2021-04-11 RX ADMIN — Medication 10 ML: at 07:47

## 2021-04-11 RX ADMIN — SERTRALINE 100 MG: 100 TABLET, FILM COATED ORAL at 07:47

## 2021-04-11 RX ADMIN — ONDANSETRON 4 MG: 2 INJECTION INTRAMUSCULAR; INTRAVENOUS at 15:14

## 2021-04-11 RX ADMIN — ATORVASTATIN CALCIUM 80 MG: 40 TABLET, FILM COATED ORAL at 20:38

## 2021-04-11 RX ADMIN — METRONIDAZOLE 500 MG: 500 INJECTION, SOLUTION INTRAVENOUS at 16:06

## 2021-04-11 RX ADMIN — ACETAMINOPHEN 650 MG: 325 TABLET ORAL at 07:47

## 2021-04-11 RX ADMIN — MORPHINE SULFATE 2 MG: 2 INJECTION, SOLUTION INTRAMUSCULAR; INTRAVENOUS at 15:14

## 2021-04-11 RX ADMIN — CEFTRIAXONE SODIUM 1000 MG: 1 INJECTION, POWDER, FOR SOLUTION INTRAMUSCULAR; INTRAVENOUS at 15:29

## 2021-04-11 RX ADMIN — MORPHINE SULFATE 2 MG: 2 INJECTION, SOLUTION INTRAMUSCULAR; INTRAVENOUS at 07:46

## 2021-04-11 RX ADMIN — Medication 10 ML: at 20:38

## 2021-04-11 RX ADMIN — METRONIDAZOLE 500 MG: 500 INJECTION, SOLUTION INTRAVENOUS at 07:48

## 2021-04-11 RX ADMIN — METRONIDAZOLE 500 MG: 500 INJECTION, SOLUTION INTRAVENOUS at 00:52

## 2021-04-11 RX ADMIN — MORPHINE SULFATE 2 MG: 2 INJECTION, SOLUTION INTRAMUSCULAR; INTRAVENOUS at 00:51

## 2021-04-11 RX ADMIN — TRAZODONE HYDROCHLORIDE 100 MG: 100 TABLET ORAL at 20:38

## 2021-04-11 RX ADMIN — ASPIRIN 81 MG: 81 TABLET, COATED ORAL at 07:47

## 2021-04-11 RX ADMIN — CLOPIDOGREL BISULFATE 75 MG: 75 TABLET ORAL at 07:47

## 2021-04-11 ASSESSMENT — PAIN - FUNCTIONAL ASSESSMENT
PAIN_FUNCTIONAL_ASSESSMENT: ACTIVITIES ARE NOT PREVENTED

## 2021-04-11 ASSESSMENT — PAIN DESCRIPTION - ORIENTATION
ORIENTATION: LEFT
ORIENTATION: LEFT
ORIENTATION: LEFT;RIGHT

## 2021-04-11 ASSESSMENT — PAIN DESCRIPTION - LOCATION
LOCATION: CHEST
LOCATION: ABDOMEN;CHEST

## 2021-04-11 ASSESSMENT — PAIN DESCRIPTION - PROGRESSION
CLINICAL_PROGRESSION: GRADUALLY WORSENING
CLINICAL_PROGRESSION: GRADUALLY WORSENING

## 2021-04-11 ASSESSMENT — PAIN SCALES - GENERAL
PAINLEVEL_OUTOF10: 5
PAINLEVEL_OUTOF10: 4
PAINLEVEL_OUTOF10: 6

## 2021-04-11 ASSESSMENT — PAIN DESCRIPTION - PAIN TYPE
TYPE: ACUTE PAIN
TYPE: ACUTE PAIN

## 2021-04-11 ASSESSMENT — PAIN DESCRIPTION - ONSET
ONSET: ON-GOING
ONSET: ON-GOING

## 2021-04-11 ASSESSMENT — PAIN DESCRIPTION - DESCRIPTORS: DESCRIPTORS: ACHING;TENDER

## 2021-04-11 ASSESSMENT — PAIN DESCRIPTION - FREQUENCY: FREQUENCY: CONTINUOUS

## 2021-04-11 NOTE — PROGRESS NOTES
MT called and states that patient looks like they're going into 2nd  degree heart block. Strips printed and placed in soft chart. Pt back into NSR with 1st degree block. Pt asymptomatic, lying quietly in bed with eyes closed. He appears to be sleeping.

## 2021-04-11 NOTE — FLOWSHEET NOTE
04/10/21 1916   Vital Signs   Temp 97.2 °F (36.2 °C)   Temp Source Oral   Pulse 82   Heart Rate Source Monitor   Resp 18   /83   BP Location Right lower arm   Level of Consciousness Alert (0)   MEWS Score 1   Oxygen Therapy   SpO2 93 %   O2 Device None (Room air)   Pt assessment complete. Pt lying in bed quietly. Lung sounds clear. Pt on room air at this time. HR 82 NSR with 1st degree block per monitor. Pt c/o lower abd pain. PRN morphine  2mg IVP given. Pt rates pain 5/10. Nightly medications given as well. No other needs voiced. Call light within reach.

## 2021-04-11 NOTE — PROGRESS NOTES
Progress Note    Admit Date:  4/8/2021    60M presents with multiple complaints- chronic chest pain since CABG, intermittent cough, and LLQ abdominal pain. Labs revealed DENIA. CT abd showed acute uncomplicated diverticulitis and he was admitted for further care    Subjective:  Mr. Rubi Hanley continues to complain of feeling weak and fatigued   abd pain better   Episodes of transient asystole  and 2 degree heart block on monitor(Mobitz 1 )    Objective:   Patient Vitals for the past 4 hrs:   BP Temp Temp src Pulse Resp SpO2   04/11/21 0740 128/75 97 °F (36.1 °C) Oral 88 18 95 %            Intake/Output Summary (Last 24 hours) at 4/11/2021 1105  Last data filed at 4/11/2021 1056  Gross per 24 hour   Intake 450 ml   Output 850 ml   Net -400 ml       Physical Exam:    Gen: No distress. Alert. Eyes: PERRL. No sclera icterus. No conjunctival injection. ENT: No discharge. Pharynx clear. Neck: No JVD. Trachea midline. Resp: No accessory muscle use. No crackles. No wheezes. No rhonchi. CV: Regular rate. Regular rhythm. No murmur. No rub. No edema. Sternotomy scar noted  Chest wall is TTP  GI:  Mild + LLQ tender. Otherwise soft abdomen, no guarding or rigidity. Non-distended. Normal bowel sounds. Skin: Warm and dry. No nodule on exposed extremities. No rash on exposed extremities. M/S: No cyanosis. No joint deformity. No clubbing. Neuro: Awake. Grossly nonfocal    Psych: Oriented x 3. No anxiety or agitation.          Scheduled Meds:   aspirin  81 mg Oral Daily    atorvastatin  80 mg Oral Nightly    clopidogrel  75 mg Oral Daily    [Held by provider] metoprolol tartrate  25 mg Oral BID    sertraline  100 mg Oral Daily    traZODone  100 mg Oral Nightly    metroNIDAZOLE  500 mg Intravenous Q8H    sodium chloride flush  5-40 mL Intravenous 2 times per day    enoxaparin  40 mg Subcutaneous Daily    cefTRIAXone (ROCEPHIN) IV  1,000 mg Intravenous Q24H       Continuous Infusions:   sodium chloride PRN Meds:  regadenoson, albuterol, sodium chloride flush, sodium chloride, promethazine **OR** ondansetron, polyethylene glycol, acetaminophen **OR** acetaminophen, morphine, ipratropium      Data:  CBC:   Recent Labs     04/09/21  0442 04/10/21  0523 04/11/21  0502   WBC 11.0 10.0 8.9   HGB 12.3* 13.8 12.8*   HCT 37.8* 41.8 38.2*   MCV 89.2 89.3 88.2    193 177     BMP:   Recent Labs     04/09/21  0442 04/10/21  0523 04/11/21  0502    136 138   K 4.6 4.9 4.5    102 103   CO2 25 27 29   BUN 59* 28* 14   CREATININE 2.2* 1.1 1.0     LIVER PROFILE:   Recent Labs     04/08/21  1818 04/09/21  0442   AST 16 13*   ALT 21 16   BILIDIR  --  <0.2   BILITOT 0.5 0.5   ALKPHOS 112 92     PT/INR: No results for input(s): PROTIME, INR in the last 72 hours. CULTURES  COVID 19: not detected  Influenza a/b: not detected      RADIOLOGY  CT ABDOMEN PELVIS WO CONTRAST Additional Contrast? None   Final Result   Acute, uncomplicated diverticulitis of the sigmoid colon. Nonobstructive left nephrolithiasis. XR CHEST PORTABLE   Final Result   No acute cardiopulmonary disease         NM Cardiac Stress Test Nuclear Imaging    (Results Pending)     Assessment/Plan:    #Acute uncomplicated diverticulitis  - Rocephin and flagyl D#3  Resolving   Now on full liquid diet - advance     #DENIA- resolved   - suspect pre renal with lasix use (dose increased to 80 mg BID by cardio on 3/3/21 2/2 patient's complaints of LEE), as well as intermittent vomiting, acute illness, and use of lisinopril  - no obstruction on CT  IVF  - Baseline Crt 0.8 (2/1/21). Crt 2.6 on admit-> 2.2-- 1.1  - Hold lasix, lisinopril  D/c IVF     #CAD sp CABG  #Chest pain- suspect MSK etiology   - EKG is not ischemic.   Initial trop neg, check one more  - Cont ASA, plavix,  statin  - monitor on tele  D/c toprol given heart block periodically seen on monitor   lexiscan tomorrow    #Ischemic Cardiomyopathy  #Chronic systolic CHF  - EF 80-15%  - he is on Lasix 80mg BID at home- held with DENIA  - hold lisinopril with DENIA  - cont toprol  - monitor fluid status closely  Hold toprol    #COPD  - stopped smoking after his CABG in Sept 2020  - no exacerbation currently- cont nebulizers    #OANH  - not compliant with CPAP per chart review    #Morbid Obesity  - Body mass index is 48.46 kg/m². - Complicating assessment and treatment. Placing patient at risk for multiple co-morbidities as well as early death and contributing to the patient's presentation.   - Counseled on weight loss. DVT Prophylaxis: Lovenox   Diet: Diet NPO, After Midnight  DIET FULL LIQUID; No Caffeine  Code Status: Full Code      EARL Muller.

## 2021-04-11 NOTE — PROGRESS NOTES
Pt is lying in bed with their eyes closed. Respirations are easy and even. Call light within reach bed in lowest position with the wheels locked. Will continue to monitor.  Elvin Brady

## 2021-04-11 NOTE — PROGRESS NOTES
Patient's EF (Ejection Fraction) is greater than 40%    Patient's weights and intake/output reviewed:    Patient's Last Weight: 282 lbs obtained by standing scale. Difference of 1 lbs less than last documented weight. Intake/Output Summary (Last 24 hours) at 4/11/2021 1047  Last data filed at 4/11/2021 0848  Gross per 24 hour   Intake 450 ml   Output 650 ml   Net -200 ml         Pt is currently on room air. . Pt denies shortness of breath. Pt without lower extremity edema. Patient and/or Family's stated Goal of Care this Admission: reduce shortness of breath, increase activity tolerance, better understand heart failure and disease management, be more comfortable, reduce lower extremity edema and unable to assess, will attempt again prior to discharge      Comorbidities Reviewed Yes  Patient has a past medical history of Anxiety, Aortic valve calcification, Chronic obstructive pulmonary disease ([address] Coronary artery calcification, Crush injury of right foot, DDD (degenerative disc disease), lumbar, Erectile dysfunction, Fatigue, History of alcohol abuse, History of drug use, HNP (herniated nucleus pulposus), lumbar, Hyperglycemia, Hypersomnia, Kidney stone, Lumbar radiculopathy, Lumbar spine pain, LVH (left ventricular hypertrophy), Observed sleep apnea, Reactive depression, Spondylosis without myelopathy or radiculopathy, lumbar region, and Tobacco use.          >>For CHF and Comorbidity documentation on Education Time and Topics, please see Education Tab

## 2021-04-11 NOTE — PROGRESS NOTES
[address]   Progress Note  Cardiology      HPI: Seeing Mr. Cliff Reid today for f/u unspecified chest pain, Mobitz I Wenckebach, and short period of asystole. He c/o cough this AM but overall feels better. Still reports feeling weak. Tele reviewed showing NSR and Mobitz I Wenckebach. Physical Examination:    Vitals:    04/11/21 0111   BP: 137/81   Pulse: 93   Resp: 18   Temp: 98.1 °F (36.7 °C)   SpO2: 93%          Constitutional and General Appearance: NAD   Respiratory:  · Normal excursion and expansion without use of accessory muscles  · Resp Auscultation: Normal breath sounds without dullness  Cardiovascular:  · The apical impulses not displaced  · Heart tones are crisp and normal  · Cervical veins are not engorged  · The carotid upstroke is normal in amplitude and contour without delay or bruit  · Normal S1S2, No S3, No Murmur  · Peripheral pulses are symmetrical and full  · There is no clubbing, cyanosis of the extremities.   · No edema  · Pedal Pulses: 2+ and equal   Abdomen:  · No masses or tenderness  · Liver/Spleen: No Abnormalities Noted  Neurological/Psychiatric:  · Alert and oriented in all spheres  · Moves all extremities well  · No abnormalities of mood, affect, memory, mentation, or behavior are noted  Skin: warm and dry      Lab Results   Component Value Date    WBC 8.9 04/11/2021    HGB 12.8 (L) 04/11/2021    HCT 38.2 (L) 04/11/2021    MCV 88.2 04/11/2021     04/11/2021     Lab Results   Component Value Date    CREATININE 1.0 04/11/2021    BUN 14 04/11/2021     04/11/2021    K 4.5 04/11/2021     04/11/2021    CO2 29 04/11/2021     Lab Results   Component Value Date    INR 1.10 10/19/2020    PROTIME 12.7 10/19/2020        Limited ECHO 9/29/20   Summary   Limited only f/u for LVEF post op CABG   Technically difficult examination.   Normal left ventricular systolic function with ejection fraction of 55-60%.   No regional wall motion abnormalites are seen.   Compared to previous study from 8- no changes noted in left   ventricular function.     Lexiscan 9/22/20   Summary     Increase left ventricular systolic volume with hypokinesis/decreased     myocardial thickening of the inferolateral segment. Large area of inducible     ischemia of the LAD and left circumflex territories. Post-stress LVEF normal     at 56%.     Overall findings represent highly abnormal study, high risk scan.          [facility] 9/22/20  1. Left main coronary artery has moderate calcific disease. It gave off the left anterior descending artery and left circumflex.     2. Left anterior descending artery has severe atherosclerotic disease.  It was moderate in size. It gave off a large early diagonal branch that has ostial 90%. The LAD covered the entire apex of the left ventricle.      3. Left circumflex has severe atherosclerotic disease.  It was moderate in size. There was a moderate sized obtuse marginal branch that is occluded.     4. Right coronary artery has mild atherosclerotic disease. It was large in size and was the dominant artery.             ~there is faint collaterals to the LAD     5. Left ventriculogram showed normal LVEF at 55-60%. Wall motion was normal . There was no significant mitral valve or aortic valve disease noted. LVEDP was normal. There was no gradient noted across the aortic valve during pullback of the catheter.     3/12/21 ECHO Summary   Limited echo for left ventricle systolic function. Definity is used for   myocardial border enhancement.   LV systolic function is mildly reduced with a visually estimated EF=45-50%.   The left ventricle is normal in size with normal wall thickness.   More prominent Inferior HK on certain views.   Indeterminate diastolic function.     Assessment:  Christ Fernandez is a 64 y.o. patient who presented to [facility] 4/8/21 with c/o SOB, CP, abdominal pain, and N/V. Normally sees Dr. Trisha Pineda for cardiology--last OV 3/3/21.  He has PMH of CAD s/p 2V CABG (LIMA-LAD, SVG-OM, and SVG-diagonal) in 9/20, COPD, anxiety, kidney stones, OANH, and hx ETOH/drug use. Most recent ECHO 3/12/21 showed EF-45-50% (EF=55-60% in 9/20); more prominent inferior HK; indeterminate diastolic function. Most recent [address] [place] [address] 9/22/20 showed large area ischemia in LAD and CCx territories; EF=50%; inferolateral HK. Most recent carotid doppler 9/20 <50% stenoses bilaterally. He presented in Sept 2020 with [place] and underwent devon nuc study which was abnormal leading to cath and eventual CABG. Now presents with multiple complaints including SOB, CP, abdominal pain, and N/V. Reports not feeling good since CABG with fatigue. Most recently with increased LEE and also left abdominal pain with N/V/D. Reports N/V has been chronic since CABG as well. Reports CP to touch of chest wall around incision. At last OV with Dr. Leticia Tejada his lasix was increased to 80mg BID for concerns of CHF with weight gain. Also had lisinopril increased to 20mg qd. Admit EKG NSR 91bpm; LV; nonspecific ST change; inferior infarct (compared to 10/20 EKG minimal criteria now present for inferior infarct). Note 2 negative Mary; BUN/Cr=63/2.6. Note CT abd/pelvis + for acute sigmoid diverticulitis; nonobstructing left kidney stone. Note tele strips reviewed showing Mobitz I Wenckebach and short asystole with 3 P waves and no QRS complexes. Diagnosis of unspecified chest pain, Mobitz I Wenckebach, and short period of asystole in middle-aged male with acute diverticulitis and ARF likely due to increased diuretic and ACE-I dosing. Hx mild ischemic CM and acute systolic CHF prior.      Recs:  1. Agree with holding lasix, lisinopril, and metoprolol. .Note cough may be due to ACE-I and would continue holding and see how he feels after couple of weeks. 2. Watch telemetry off beta blocker and with improved renal function. Tele shows NSR with Mobitz I Wenckebach which is not concerning.  No symptoms with this issue reported. 3. Treat diverticulitis appropriately. Currently on flagyl and rocephin abx. 4.  I will order a lexiscan nuclear stress test to assess myocardial perfusion and LV function. Dr. Prabhjot Bolaños had wanted lexiscan to reassess myocardial perfusion given mild reduction in EF on March 2021 ECHO. Will assess and make further recs after results known. 5. Renal function and abdominal pain improved. 6. Continue baby asa qd, plavix 75mg qd, lipitor 80mg qd.        Patient Active Problem List   Diagnosis    Crush injury of right foot    Shortness of breath    Chest pain    Erectile dysfunction    Hyperglycemia    Anxiety    Depression    Tobacco abuse    History of alcohol abuse    Fatigue    OANH (obstructive sleep apnea)    Hypersomnia    Chronic obstructive pulmonary disease (HCC)    Lumbar radiculopathy    HNP (herniated nucleus pulposus), lumbar    Spondylosis without myelopathy or radiculopathy, lumbar region    DDD (degenerative disc disease), lumbar    Lumbar spine pain    Acute respiratory distress    Class 3 severe obesity with body mass index (BMI) of 45.0 to 49.9 in adult ([facility])    Pneumonia, primary atypical    Acute respiratory failure with hypoxia ([facility])    Moderate protein-calorie malnutrition (HCC)    Acute respiratory failure (HCC)    Acute on chronic respiratory failure with hypoxemia ([facility])    Acute diastolic congestive heart failure ([facility])    Hyperlipidemia    Insomnia    Morbid obesity with BMI of 45.0-49.9, adult ([facility])    Abnormal cardiovascular stress test    Coronary artery disease involving native heart with angina pectoris ([facility])    S/P three vessel coronary artery bypass    Acute kidney injury ([facility])    Acute diverticulitis    Chronic systolic congestive heart failure (HCC)    Mobitz type I Wenckebach atrioventricular block    Asystole ([facility])    Ischemic cardiomyopathy    Diverticulitis of colon

## 2021-04-12 ENCOUNTER — APPOINTMENT (OUTPATIENT)
Dept: NUCLEAR MEDICINE | Age: 61
DRG: 392 | End: 2021-04-12
Payer: COMMERCIAL

## 2021-04-12 LAB
HCT VFR BLD CALC: 38.5 % (ref 40.5–52.5)
HEMOGLOBIN: 12.8 G/DL (ref 13.5–17.5)
LV EF: 48 %
LVEF MODALITY: NORMAL

## 2021-04-12 PROCEDURE — 85014 HEMATOCRIT: CPT

## 2021-04-12 PROCEDURE — 2500000003 HC RX 250 WO HCPCS: Performed by: HOSPITALIST

## 2021-04-12 PROCEDURE — 6360000002 HC RX W HCPCS: Performed by: PHYSICIAN ASSISTANT

## 2021-04-12 PROCEDURE — 36415 COLL VENOUS BLD VENIPUNCTURE: CPT

## 2021-04-12 PROCEDURE — 2580000003 HC RX 258: Performed by: PHYSICIAN ASSISTANT

## 2021-04-12 PROCEDURE — 6360000002 HC RX W HCPCS: Performed by: HOSPITALIST

## 2021-04-12 PROCEDURE — 6360000002 HC RX W HCPCS: Performed by: INTERNAL MEDICINE

## 2021-04-12 PROCEDURE — 85018 HEMOGLOBIN: CPT

## 2021-04-12 PROCEDURE — A9502 TC99M TETROFOSMIN: HCPCS | Performed by: INTERNAL MEDICINE

## 2021-04-12 PROCEDURE — 78452 HT MUSCLE IMAGE SPECT MULT: CPT

## 2021-04-12 PROCEDURE — 3430000000 HC RX DIAGNOSTIC RADIOPHARMACEUTICAL: Performed by: INTERNAL MEDICINE

## 2021-04-12 PROCEDURE — 6370000000 HC RX 637 (ALT 250 FOR IP): Performed by: HOSPITALIST

## 2021-04-12 PROCEDURE — 2060000000 HC ICU INTERMEDIATE R&B

## 2021-04-12 PROCEDURE — 99233 SBSQ HOSP IP/OBS HIGH 50: CPT | Performed by: INTERNAL MEDICINE

## 2021-04-12 PROCEDURE — 93017 CV STRESS TEST TRACING ONLY: CPT

## 2021-04-12 PROCEDURE — 2580000003 HC RX 258: Performed by: HOSPITALIST

## 2021-04-12 PROCEDURE — 99232 SBSQ HOSP IP/OBS MODERATE 35: CPT | Performed by: INTERNAL MEDICINE

## 2021-04-12 RX ORDER — AMINOPHYLLINE DIHYDRATE 25 MG/ML
100 INJECTION, SOLUTION INTRAVENOUS ONCE
Status: COMPLETED | OUTPATIENT
Start: 2021-04-12 | End: 2021-04-12

## 2021-04-12 RX ADMIN — TRAZODONE HYDROCHLORIDE 100 MG: 100 TABLET ORAL at 20:22

## 2021-04-12 RX ADMIN — Medication 10 ML: at 09:53

## 2021-04-12 RX ADMIN — REGADENOSON 0.4 MG: 0.08 INJECTION, SOLUTION INTRAVENOUS at 13:10

## 2021-04-12 RX ADMIN — ONDANSETRON 4 MG: 2 INJECTION INTRAMUSCULAR; INTRAVENOUS at 06:34

## 2021-04-12 RX ADMIN — TETROFOSMIN 33.6 MILLICURIE: 1.38 INJECTION, POWDER, LYOPHILIZED, FOR SOLUTION INTRAVENOUS at 13:10

## 2021-04-12 RX ADMIN — MORPHINE SULFATE 2 MG: 2 INJECTION, SOLUTION INTRAMUSCULAR; INTRAVENOUS at 18:26

## 2021-04-12 RX ADMIN — METRONIDAZOLE 500 MG: 500 INJECTION, SOLUTION INTRAVENOUS at 01:13

## 2021-04-12 RX ADMIN — ACETAMINOPHEN 650 MG: 325 TABLET ORAL at 09:51

## 2021-04-12 RX ADMIN — METRONIDAZOLE 500 MG: 500 INJECTION, SOLUTION INTRAVENOUS at 09:52

## 2021-04-12 RX ADMIN — MORPHINE SULFATE 2 MG: 2 INJECTION, SOLUTION INTRAMUSCULAR; INTRAVENOUS at 14:56

## 2021-04-12 RX ADMIN — MORPHINE SULFATE 2 MG: 2 INJECTION, SOLUTION INTRAMUSCULAR; INTRAVENOUS at 01:13

## 2021-04-12 RX ADMIN — METRONIDAZOLE 500 MG: 500 INJECTION, SOLUTION INTRAVENOUS at 18:27

## 2021-04-12 RX ADMIN — CEFTRIAXONE SODIUM 1000 MG: 1 INJECTION, POWDER, FOR SOLUTION INTRAMUSCULAR; INTRAVENOUS at 15:03

## 2021-04-12 RX ADMIN — MORPHINE SULFATE 2 MG: 2 INJECTION, SOLUTION INTRAMUSCULAR; INTRAVENOUS at 06:34

## 2021-04-12 RX ADMIN — ATORVASTATIN CALCIUM 80 MG: 40 TABLET, FILM COATED ORAL at 20:22

## 2021-04-12 RX ADMIN — AMINOPHYLLINE 100 MG: 25 INJECTION, SOLUTION INTRAVENOUS at 13:19

## 2021-04-12 RX ADMIN — Medication 10 ML: at 20:22

## 2021-04-12 ASSESSMENT — PAIN DESCRIPTION - DESCRIPTORS: DESCRIPTORS: ACHING;DISCOMFORT

## 2021-04-12 ASSESSMENT — PAIN - FUNCTIONAL ASSESSMENT: PAIN_FUNCTIONAL_ASSESSMENT: PREVENTS OR INTERFERES SOME ACTIVE ACTIVITIES AND ADLS

## 2021-04-12 ASSESSMENT — PAIN SCALES - GENERAL
PAINLEVEL_OUTOF10: 0
PAINLEVEL_OUTOF10: 4
PAINLEVEL_OUTOF10: 8
PAINLEVEL_OUTOF10: 0
PAINLEVEL_OUTOF10: 4
PAINLEVEL_OUTOF10: 6

## 2021-04-12 ASSESSMENT — PAIN DESCRIPTION - LOCATION: LOCATION: CHEST

## 2021-04-12 ASSESSMENT — PAIN DESCRIPTION - ONSET: ONSET: AWAKENED FROM SLEEP

## 2021-04-12 ASSESSMENT — PAIN DESCRIPTION - PROGRESSION: CLINICAL_PROGRESSION: GRADUALLY WORSENING

## 2021-04-12 ASSESSMENT — PAIN DESCRIPTION - FREQUENCY: FREQUENCY: CONTINUOUS

## 2021-04-12 NOTE — PROGRESS NOTES
Pt in bed, awake, A/O X4. Shift assessment complete, night meds given. Pt C/O 4/10 pain, states pain is a lot better after pain meds. . No other needs expressed. Call light in reach. Will monitor.   Pa Li

## 2021-04-12 NOTE — PROGRESS NOTES
[address] [facility]   Progress Note  Cardiology      HPI: Seeing Mr. Ofe Abdi today for f/u unspecified chest pain, Mobitz I Wenckebach, and short period of asystole. He still c/o weakness and cough this AM but no other specific complaints. Tele reviewed showing NSR and Mobitz I Wenckebach. Physical Examination:    Vitals:    04/12/21 0110   BP: 128/76   Pulse: 92   Resp: 16   Temp: 97.7 °F (36.5 °C)   SpO2: 93%          Constitutional and General Appearance: NAD   Respiratory:  · Normal excursion and expansion without use of accessory muscles  · Resp Auscultation: Soft breath sounds   Cardiovascular:  · The apical impulses not displaced  · Heart tones are crisp and normal  · Cervical veins are not engorged  · The carotid upstroke is normal in amplitude and contour without delay or bruit  · Normal S1S2, No S3, No Murmur  · Peripheral pulses are symmetrical and full  · There is no clubbing, cyanosis of the extremities.   · No edema  · Pedal Pulses: 2+ and equal   Abdomen:  · No masses or tenderness  · Liver/Spleen: No Abnormalities Noted  Neurological/Psychiatric:  · Alert and oriented in all spheres  · Moves all extremities well  · No abnormalities of mood, affect, memory, mentation, or behavior are noted  Skin: warm and dry      Lab Results   Component Value Date    WBC 10.6 04/11/2021    HGB 12.8 (L) 04/12/2021    HCT 38.5 (L) 04/12/2021    MCV 88.4 04/11/2021     04/11/2021     Lab Results   Component Value Date    CREATININE 1.0 04/11/2021    BUN 14 04/11/2021     04/11/2021    K 4.5 04/11/2021     04/11/2021    CO2 29 04/11/2021     Lab Results   Component Value Date    INR 1.10 10/19/2020    PROTIME 12.7 10/19/2020        Limited ECHO 9/29/20   Summary   Limited only f/u for LVEF post op CABG   Technically difficult examination.   Normal left ventricular systolic function with ejection fraction of 55-60%.   No regional wall motion abnormalites are seen.   Compared to previous study from 8- no changes noted in left   ventricular function.     Lexiscan 9/22/20   Summary     Increase left ventricular systolic volume with hypokinesis/decreased     myocardial thickening of the inferolateral segment. Large area of inducible     ischemia of the LAD and left circumflex territories. Post-stress LVEF normal     at 56%.     Overall findings represent highly abnormal study, high risk scan.          [facility] 9/22/20  1. Left main coronary artery has moderate calcific disease. It gave off the left anterior descending artery and left circumflex.     2. Left anterior descending artery has severe atherosclerotic disease.  It was moderate in size. It gave off a large early diagonal branch that has ostial 90%. The LAD covered the entire apex of the left ventricle.      3. Left circumflex has severe atherosclerotic disease.  It was moderate in size. There was a moderate sized obtuse marginal branch that is occluded.     4. Right coronary artery has mild atherosclerotic disease. It was large in size and was the dominant artery.             ~there is faint collaterals to the LAD     5. Left ventriculogram showed normal LVEF at 55-60%. Wall motion was normal . There was no significant mitral valve or aortic valve disease noted. LVEDP was normal. There was no gradient noted across the aortic valve during pullback of the catheter.     3/12/21 ECHO Summary   Limited echo for left ventricle systolic function. Definity is used for   myocardial border enhancement.   LV systolic function is mildly reduced with a visually estimated EF=45-50%.   The left ventricle is normal in size with normal wall thickness.   More prominent Inferior HK on certain views.   Indeterminate diastolic function.     Assessment:  Charlee White is a 64 y.o. patient who presented to [facility] with c/o SOB, CP, abdominal pain, and N/V. Normally sees Dr. Prabhjot Bolaños for cardiology--last OV 3/3/21.  He has PMH of CAD s/p 2V CABG (LIMA-LAD, SVG-OM, and SVG-diagonal) in 9/20, COPD, anxiety, kidney stones, OANH, and hx ETOH/drug use. Most recent ECHO 3/12/21 showed EF-45-50% (EF=55-60% in 9/20); more prominent inferior HK; indeterminate diastolic function. Most recent [address] [place] [address] 9/22/20 showed large area ischemia in LAD and CCx territories; EF=50%; inferolateral HK. Most recent carotid doppler 9/20 <50% stenoses bilaterally. He presented in Sept 2020 with [place] and underwent devon nuc study which was abnormal leading to cath and eventual CABG. Now presents with multiple complaints including SOB, CP, abdominal pain, and N/V. Reports not feeling good since CABG with fatigue. Most recently with increased LEE and also left abdominal pain with N/V/D. Reports N/V has been chronic since CABG as well. Reports CP to touch of chest wall around incision. At last OV with Dr. Martins Host his lasix was increased to 80mg BID for concerns of CHF with weight gain. Also had lisinopril increased to 20mg qd. Admit EKG NSR 91bpm; LV; nonspecific ST change; inferior infarct (compared to 10/20 EKG minimal criteria now present for inferior infarct). Note 2 negative Mary; BUN/Cr=63/2.6. Note CT abd/pelvis + for acute sigmoid diverticulitis; nonobstructing left kidney stone. Note tele strips reviewed showing Mobitz I Wenckebach and short asystole with 3 P waves and no QRS complexes. Diagnosis of unspecified chest pain, Mobitz I Wenckebach, and short period of asystole in middle-aged male with acute diverticulitis and ARF likely due to increased diuretic and ACE-I dosing. Hx mild ischemic CM and acute systolic CHF prior.      Recs:  1. Agree with holding lasix, lisinopril, and metoprolol. .Note cough may be due to ACE-I and would continue holding and see how he feels after couple of weeks. 2. Watch telemetry off beta blocker. Tele shows NSR with Mobitz I Wenckebach which is not concerning. No symptoms with this issue reported. 3. Treat diverticulitis appropriately. Currently on flagyl and rocephin abx. 4.  Pending lexiscan nuclear stress test today to assess myocardial perfusion and LV function. Dr. Nesha Fowler had wanted lexiscan to reassess myocardial perfusion given mild reduction in EF on March 2021 ECHO. Will assess and make further recs after results known. 5. Renal function and abdominal pain improved. 6. Note guiac + on stool and IM doc holding baby asa qd, plavix 75mg qd, and lovenox DVT prophylaxis. Note H/H stable 12.8/38.5. I recommend restarting when IM doc feels comfortable. He does not need DAPT and could only restart baby aspirin daily. 7. Continue lipitor 80mg qd.        Patient Active Problem List   Diagnosis    Crush injury of right foot    Shortness of breath    Chest pain    Erectile dysfunction    Hyperglycemia    Anxiety    Depression    Tobacco abuse    History of alcohol abuse    Fatigue    OANH (obstructive sleep apnea)    Hypersomnia    Chronic obstructive pulmonary disease (HCC)    Lumbar radiculopathy    HNP (herniated nucleus pulposus), lumbar    Spondylosis without myelopathy or radiculopathy, lumbar region    DDD (degenerative disc disease), lumbar    Lumbar spine pain    Acute respiratory distress    Class 3 severe obesity with body mass index (BMI) of 45.0 to 49.9 in adult ([address]    Pneumonia, primary atypical    Acute respiratory failure with hypoxia ([address]    Moderate protein-calorie malnutrition (HCC)    Acute respiratory failure (HCC)    Acute on chronic respiratory failure with hypoxemia (HCC)    Acute diastolic congestive heart failure (HCC)    Hyperlipidemia    Insomnia    Morbid obesity with BMI of 45.0-49.9, adult (HCC)    Abnormal cardiovascular stress test    Coronary artery disease involving native coronary artery of native heart without angina pectoris    S/P three vessel coronary artery bypass    Acute kidney injury ([address]    Acute diverticulitis    Chronic systolic congestive heart failure ([address]    Mobitz type I Wenckebach atrioventricular block    Asystole (HCC)    Ischemic cardiomyopathy    Diverticulitis of colon

## 2021-04-12 NOTE — PROGRESS NOTES
Bedside report and Pt care transferred to [facility]. Pt denies any assistance at this time.

## 2021-04-12 NOTE — PROGRESS NOTES
Pt had Lexiscan Myoview stress test, pt c/o nausea, Aminophylline 100 mg ivp with good effect. Pt waiting to be scanned, resp easy/even. Pt in good condition.

## 2021-04-12 NOTE — PROGRESS NOTES
Pt in bed, eyes closed. No distress noted. Call light in reach. Will continue to monitor.   Mara Arrow

## 2021-04-12 NOTE — PROGRESS NOTES
Progress Note    Admit Date:  4/8/2021    60M presents with multiple complaints - chronic chest pain since CABG, intermittent cough, and LLQ abdominal pain. Labs revealed DENIA. CT abd showed acute uncomplicated diverticulitis and he was admitted for further care. Since admission abdominal pain has improved. DENIA has resolved. ACE inhibitor and diuretics on hold. Episodes of transient asystole  and 2 degree heart block on monitor(Mobitz 1 ) yesterdqay 4/11. Beta-blocker on hold. Cardiology following. Plan is for stress test today for 4-12-21  Subjective:  Mr. Sandra Reyna continues to complain of feeling weak and fatigued   abd pain better     He is having dark-colored BMs. Stool guaiac was positive. Objective:   Patient Vitals for the past 4 hrs:   BP Temp Temp src Pulse Resp SpO2   04/12/21 0743 130/81 97.9 °F (36.6 °C) Oral 95 12 95 %            Intake/Output Summary (Last 24 hours) at 4/12/2021 1053  Last data filed at 4/12/2021 0916  Gross per 24 hour   Intake 750 ml   Output 1175 ml   Net -425 ml       Physical Exam:    Gen: No distress. Alert. Awake and well-oriented. .  Continues to remain very sleepy and fatigued  Eyes: PERRL. No sclera icterus. No conjunctival injection. ENT: No discharge. Pharynx clear. Neck: No JVD. Trachea midline. Resp: No accessory muscle use. No crackles. No wheezes. No rhonchi. CV: Regular rate. Regular rhythm. No murmur. No rub. No edema. Sternotomy scar noted  Chest wall is TTP  GI:  Mild + LLQ tender. Otherwise soft abdomen, no guarding or rigidity. Non-distended. Normal bowel sounds. Skin: Warm and dry. No nodule on exposed extremities. No rash on exposed extremities. M/S: No cyanosis. No joint deformity. No clubbing. Neuro: Awake. Grossly nonfocal    Psych: Oriented x 3. No anxiety or agitation.          Scheduled Meds:   aspirin  81 mg Oral Daily    atorvastatin  80 mg Oral Nightly    [Held by provider] clopidogrel  75 mg Oral Daily    [Held by provider] metoprolol tartrate  25 mg Oral BID    sertraline  100 mg Oral Daily    traZODone  100 mg Oral Nightly    metroNIDAZOLE  500 mg Intravenous Q8H    sodium chloride flush  5-40 mL Intravenous 2 times per day    [Held by provider] enoxaparin  40 mg Subcutaneous Daily    cefTRIAXone (ROCEPHIN) IV  1,000 mg Intravenous Q24H       Continuous Infusions:   sodium chloride         PRN Meds:  regadenoson, albuterol, sodium chloride flush, sodium chloride, promethazine **OR** ondansetron, polyethylene glycol, acetaminophen **OR** acetaminophen, morphine, ipratropium      Data:  CBC:   Recent Labs     04/10/21  0523 04/11/21  0502 04/11/21  2241 04/12/21  0257   WBC 10.0 8.9 10.6  --    HGB 13.8 12.8* 12.7* 12.8*   HCT 41.8 38.2* 37.8* 38.5*   MCV 89.3 88.2 88.4  --     177 162  --      BMP:   Recent Labs     04/10/21  0523 04/11/21  0502    138   K 4.9 4.5    103   CO2 27 29   BUN 28* 14   CREATININE 1.1 1.0     LIVER PROFILE:   No results for input(s): AST, ALT, LIPASE, BILIDIR, BILITOT, ALKPHOS in the last 72 hours. Invalid input(s): AMYLASE,  ALB  PT/INR: No results for input(s): PROTIME, INR in the last 72 hours. CULTURES  COVID 19: not detected  Influenza a/b: not detected      RADIOLOGY  CT ABDOMEN PELVIS WO CONTRAST Additional Contrast? None   Final Result   Acute, uncomplicated diverticulitis of the sigmoid colon. Nonobstructive left nephrolithiasis.          XR CHEST PORTABLE   Final Result   No acute cardiopulmonary disease         NM Cardiac Stress Test Nuclear Imaging    (Results Pending)       Assessment/Plan:    #Acute uncomplicated diverticulitis  -Continue IV antibiotics Rocephin and flagyl D#4  Resolving   Now on full liquid diet - advance as tolerated    #DENIA- resolved   - suspect pre renal with lasix use (dose increased to 80 mg BID by cardio on 3/3/21 2/2 patient's complaints of LEE), as well as intermittent vomiting, acute illness, and use of lisinopril  - no obstruction on CT  IVF  - Baseline Crt 0.8 (2/1/21). Crt 2.6 on admit-> 2.2-- 1.1- > 1.0  - Hold lasix, lisinopril  D/c IVF     #CAD sp CABG  #Chest pain- suspect MSK etiology   Bradyarrhythmia  - EKG is not ischemic. Initial trop neg, check one more  - Cont ASA, plavix,  Statin  -Episode of asystole and Mobitz type I heart block on 4/11/2021  - monitor on tele  D/c toprol given heart block periodically seen on monitor   lexiscan stress test today  Continue statins, resume aspirin    #Ischemic Cardiomyopathy  #Chronic systolic CHF  - EF 00-64%  - he is on Lasix 80mg BID at home- held with DENIA  - hold lisinopril with DENIA  -Hold toprol due to bradyarrhythmia  - monitor fluid status closely    #COPD  - stopped smoking after his CABG in Sept 2020  - no exacerbation currently- cont nebulizers    #OANH  - not compliant with CPAP per chart review    #Morbid Obesity  - Body mass index is 48.53 kg/m². - Complicating assessment and treatment. Placing patient at risk for multiple co-morbidities as well as early death and contributing to the patient's presentation.   - Counseled on weight loss.       DVT Prophylaxis: Lovenox   Diet: Diet NPO, After Midnight  Code Status: Full Code    Norma Hunt MD   4/12/2021

## 2021-04-13 ENCOUNTER — APPOINTMENT (OUTPATIENT)
Dept: NUCLEAR MEDICINE | Age: 61
DRG: 392 | End: 2021-04-13
Payer: COMMERCIAL

## 2021-04-13 LAB
ANION GAP SERPL CALCULATED.3IONS-SCNC: 6 MMOL/L (ref 3–16)
BUN BLDV-MCNC: 10 MG/DL (ref 7–20)
CALCIUM SERPL-MCNC: 9.4 MG/DL (ref 8.3–10.6)
CHLORIDE BLD-SCNC: 104 MMOL/L (ref 99–110)
CO2: 30 MMOL/L (ref 21–32)
CREAT SERPL-MCNC: 0.9 MG/DL (ref 0.9–1.3)
GFR AFRICAN AMERICAN: >60
GFR NON-AFRICAN AMERICAN: >60
GLUCOSE BLD-MCNC: 113 MG/DL (ref 70–99)
HCT VFR BLD CALC: 40.4 % (ref 40.5–52.5)
HEMOGLOBIN: 13.3 G/DL (ref 13.5–17.5)
MCH RBC QN AUTO: 29.5 PG (ref 26–34)
MCHC RBC AUTO-ENTMCNC: 32.8 G/DL (ref 31–36)
MCV RBC AUTO: 89.8 FL (ref 80–100)
PDW BLD-RTO: 17.7 % (ref 12.4–15.4)
PLATELET # BLD: 170 K/UL (ref 135–450)
PMV BLD AUTO: 8 FL (ref 5–10.5)
POTASSIUM SERPL-SCNC: 4.5 MMOL/L (ref 3.5–5.1)
RBC # BLD: 4.5 M/UL (ref 4.2–5.9)
SODIUM BLD-SCNC: 140 MMOL/L (ref 136–145)
WBC # BLD: 10.5 K/UL (ref 4–11)

## 2021-04-13 PROCEDURE — 6360000002 HC RX W HCPCS: Performed by: PHYSICIAN ASSISTANT

## 2021-04-13 PROCEDURE — 85027 COMPLETE CBC AUTOMATED: CPT

## 2021-04-13 PROCEDURE — 36415 COLL VENOUS BLD VENIPUNCTURE: CPT

## 2021-04-13 PROCEDURE — A9502 TC99M TETROFOSMIN: HCPCS | Performed by: INTERNAL MEDICINE

## 2021-04-13 PROCEDURE — 2500000003 HC RX 250 WO HCPCS: Performed by: HOSPITALIST

## 2021-04-13 PROCEDURE — 80048 BASIC METABOLIC PNL TOTAL CA: CPT

## 2021-04-13 PROCEDURE — 6370000000 HC RX 637 (ALT 250 FOR IP): Performed by: HOSPITALIST

## 2021-04-13 PROCEDURE — 2580000003 HC RX 258: Performed by: PHYSICIAN ASSISTANT

## 2021-04-13 PROCEDURE — 99233 SBSQ HOSP IP/OBS HIGH 50: CPT | Performed by: INTERNAL MEDICINE

## 2021-04-13 PROCEDURE — 3430000000 HC RX DIAGNOSTIC RADIOPHARMACEUTICAL: Performed by: INTERNAL MEDICINE

## 2021-04-13 PROCEDURE — 2060000000 HC ICU INTERMEDIATE R&B

## 2021-04-13 PROCEDURE — 99232 SBSQ HOSP IP/OBS MODERATE 35: CPT | Performed by: INTERNAL MEDICINE

## 2021-04-13 PROCEDURE — 6360000002 HC RX W HCPCS: Performed by: HOSPITALIST

## 2021-04-13 PROCEDURE — 2580000003 HC RX 258: Performed by: HOSPITALIST

## 2021-04-13 RX ORDER — ASPIRIN 81 MG/1
81 TABLET ORAL DAILY
Status: CANCELLED | OUTPATIENT
Start: 2021-04-14

## 2021-04-13 RX ORDER — POLYETHYLENE GLYCOL 3350 17 G/17G
17 POWDER, FOR SOLUTION ORAL DAILY PRN
Status: CANCELLED | OUTPATIENT
Start: 2021-04-13

## 2021-04-13 RX ORDER — ACETAMINOPHEN 325 MG/1
650 TABLET ORAL EVERY 6 HOURS PRN
Status: CANCELLED | OUTPATIENT
Start: 2021-04-13

## 2021-04-13 RX ORDER — SODIUM CHLORIDE 0.9 % (FLUSH) 0.9 %
5-40 SYRINGE (ML) INJECTION PRN
Status: CANCELLED | OUTPATIENT
Start: 2021-04-13

## 2021-04-13 RX ORDER — SODIUM CHLORIDE 0.9 % (FLUSH) 0.9 %
5-40 SYRINGE (ML) INJECTION EVERY 12 HOURS SCHEDULED
Status: CANCELLED | OUTPATIENT
Start: 2021-04-13

## 2021-04-13 RX ORDER — PROMETHAZINE HYDROCHLORIDE 25 MG/1
12.5 TABLET ORAL EVERY 6 HOURS PRN
Status: CANCELLED | OUTPATIENT
Start: 2021-04-13

## 2021-04-13 RX ORDER — ATORVASTATIN CALCIUM 40 MG/1
80 TABLET, FILM COATED ORAL NIGHTLY
Status: CANCELLED | OUTPATIENT
Start: 2021-04-13

## 2021-04-13 RX ORDER — ALBUTEROL SULFATE 2.5 MG/3ML
2.5 SOLUTION RESPIRATORY (INHALATION) EVERY 6 HOURS PRN
Status: CANCELLED | OUTPATIENT
Start: 2021-04-13

## 2021-04-13 RX ORDER — CLOPIDOGREL BISULFATE 75 MG/1
75 TABLET ORAL DAILY
Status: CANCELLED | OUTPATIENT
Start: 2021-04-14

## 2021-04-13 RX ORDER — MORPHINE SULFATE 2 MG/ML
2 INJECTION, SOLUTION INTRAMUSCULAR; INTRAVENOUS
Status: CANCELLED | OUTPATIENT
Start: 2021-04-13

## 2021-04-13 RX ORDER — ONDANSETRON 2 MG/ML
4 INJECTION INTRAMUSCULAR; INTRAVENOUS EVERY 6 HOURS PRN
Status: CANCELLED | OUTPATIENT
Start: 2021-04-13

## 2021-04-13 RX ORDER — ACETAMINOPHEN 650 MG/1
650 SUPPOSITORY RECTAL EVERY 6 HOURS PRN
Status: CANCELLED | OUTPATIENT
Start: 2021-04-13

## 2021-04-13 RX ORDER — TRAZODONE HYDROCHLORIDE 100 MG/1
100 TABLET ORAL NIGHTLY
Status: CANCELLED | OUTPATIENT
Start: 2021-04-13

## 2021-04-13 RX ORDER — SERTRALINE HYDROCHLORIDE 100 MG/1
100 TABLET, FILM COATED ORAL DAILY
Status: CANCELLED | OUTPATIENT
Start: 2021-04-14

## 2021-04-13 RX ORDER — SODIUM CHLORIDE 9 MG/ML
25 INJECTION, SOLUTION INTRAVENOUS PRN
Status: CANCELLED | OUTPATIENT
Start: 2021-04-13

## 2021-04-13 RX ADMIN — MORPHINE SULFATE 2 MG: 2 INJECTION, SOLUTION INTRAMUSCULAR; INTRAVENOUS at 13:58

## 2021-04-13 RX ADMIN — ATORVASTATIN CALCIUM 80 MG: 40 TABLET, FILM COATED ORAL at 20:20

## 2021-04-13 RX ADMIN — METRONIDAZOLE 500 MG: 500 INJECTION, SOLUTION INTRAVENOUS at 17:07

## 2021-04-13 RX ADMIN — MORPHINE SULFATE 2 MG: 2 INJECTION, SOLUTION INTRAMUSCULAR; INTRAVENOUS at 01:44

## 2021-04-13 RX ADMIN — METRONIDAZOLE 500 MG: 500 INJECTION, SOLUTION INTRAVENOUS at 08:38

## 2021-04-13 RX ADMIN — METRONIDAZOLE 500 MG: 500 INJECTION, SOLUTION INTRAVENOUS at 01:44

## 2021-04-13 RX ADMIN — Medication 10 ML: at 20:19

## 2021-04-13 RX ADMIN — Medication 10 ML: at 08:37

## 2021-04-13 RX ADMIN — ONDANSETRON 4 MG: 2 INJECTION INTRAMUSCULAR; INTRAVENOUS at 13:58

## 2021-04-13 RX ADMIN — CEFTRIAXONE SODIUM 1000 MG: 1 INJECTION, POWDER, FOR SOLUTION INTRAMUSCULAR; INTRAVENOUS at 13:58

## 2021-04-13 RX ADMIN — MORPHINE SULFATE 2 MG: 2 INJECTION, SOLUTION INTRAMUSCULAR; INTRAVENOUS at 08:37

## 2021-04-13 RX ADMIN — MORPHINE SULFATE 2 MG: 2 INJECTION, SOLUTION INTRAMUSCULAR; INTRAVENOUS at 20:23

## 2021-04-13 RX ADMIN — TRAZODONE HYDROCHLORIDE 100 MG: 100 TABLET ORAL at 20:20

## 2021-04-13 RX ADMIN — TETROFOSMIN 33.9 MILLICURIE: 1.38 INJECTION, POWDER, LYOPHILIZED, FOR SOLUTION INTRAVENOUS at 07:14

## 2021-04-13 ASSESSMENT — PAIN DESCRIPTION - FREQUENCY: FREQUENCY: INTERMITTENT

## 2021-04-13 ASSESSMENT — PAIN SCALES - GENERAL
PAINLEVEL_OUTOF10: 4
PAINLEVEL_OUTOF10: 5

## 2021-04-13 ASSESSMENT — PAIN DESCRIPTION - PROGRESSION: CLINICAL_PROGRESSION: GRADUALLY WORSENING

## 2021-04-13 ASSESSMENT — PAIN - FUNCTIONAL ASSESSMENT: PAIN_FUNCTIONAL_ASSESSMENT: PREVENTS OR INTERFERES SOME ACTIVE ACTIVITIES AND ADLS

## 2021-04-13 ASSESSMENT — PAIN DESCRIPTION - ORIENTATION: ORIENTATION: RIGHT;LEFT

## 2021-04-13 NOTE — FLOWSHEET NOTE
Talked about his ill health, family, business and grateful for all his support. Prayer was helpful. [facility] However, pt was worried about his ill health. 04/13/21 1420   Encounter Summary   Services provided to: Patient   Referral/Consult From: [address] [place] [address] Children;Family members;Friends/neighbors   Continue Visiting   (4/13 Support adn prayer)   Complexity of Encounter Moderate   Length of Encounter 30 minutes   Spiritual Assessment Completed Yes   Routine   Type Initial   Assessment Approachable; Hopeful   Intervention Prayer; Active listening;Explored feelings, thoughts, concerns;Nurtured hope;Sustaining presence/ Ministry of presence; Discussed relationship with God;Discussed belief system/Latter day practices/suzanne;Discussed illness/injury and it's impact   Outcome Engaged in conversation; Shared life review;Expressed gratitude;Encouraged

## 2021-04-13 NOTE — PROGRESS NOTES
Cardiology nurse, Isabel and Cardiologist Dr. Mckenzie Redd made aware of Pt's abnormal stress test results. Okay received from cardiology to order a diet for Pt as he will receive a [facility] tomorrow AM. Will continue to monitor.

## 2021-04-13 NOTE — PROGRESS NOTES
Pt in bed, awake, A/O X4. Shift assessment complete, night meds given. Pt C/O 5/10 pain, not time for pain meds at this time. Night time snack given. . No other needs expressed. Call light in reach. Will monitor.   Jerri Hobson

## 2021-04-13 NOTE — PROGRESS NOTES
Transport set up for patient to be picked up at 830am on 4-14-21 to go to [facility]. Transporter is  [phone] all paperwork is signed and on the front of the patients chart. 4-13-21 @9229.  Melquiades Villaseñor

## 2021-04-13 NOTE — PROGRESS NOTES
Pt in bed, eyes closed. No distress noted. Call light in reach. Will continue to monitor.   Aime Melgoza

## 2021-04-13 NOTE — PROGRESS NOTES
prior to admission and 2 since admission while lying down, nohemi with changing position. No syncope. Patient reports compliance with aspirin and plavix since index event. Plavix held due to concerns for GI blood loss. Home Medications:  Were reviewed and are listed in nursing record and/or below  Prior to Admission medications    Medication Sig Start Date End Date Taking?  Authorizing Provider   lisinopril (PRINIVIL;ZESTRIL) 20 MG tablet Take 0.5 tablets by mouth daily 3/15/21  Yes Cyril Olivas MD   furosemide (LASIX) 80 MG tablet Take 1 tablet by mouth 2 times daily 3/3/21 3/3/22 Yes Cyril Olivas MD   ipratropium (ATROVENT) 0.02 % nebulizer solution Take 2.5 mLs by nebulization 3 times daily 3/2/21  Yes ANICETO Abbott CNP   Handicap Placard MISC by Does not apply route Duration - 5 years 3/1/21  Yes ANICETO Abbott CNP   traZODone (DESYREL) 100 MG tablet Take 1 tablet by mouth nightly  Patient taking differently: Take 100 mg by mouth 2 times daily Am pm 3/1/21  Yes ANICETO Abbott CNP   sertraline (ZOLOFT) 100 MG tablet Take 1 tablet by mouth daily 3/1/21  Yes ANICETO Abbott CNP   acetaminophen (TYLENOL) 500 MG tablet Take 2 tablets by mouth 3 times daily for 10 days 1/22/21 4/8/21 Yes Donato Urban MD   clopidogrel (PLAVIX) 75 MG tablet Take 1 tablet by mouth daily 1/13/21  Yes Cyril Olivas MD   metoprolol tartrate (LOPRESSOR) 25 MG tablet Take 1 tablet by mouth 2 times daily Hold this medication for pulse <53 or systolic BP <922 54/41/09  Yes ANICETO Abbott CNP   ibuprofen (ADVIL;MOTRIN) 800 MG tablet Take 1 tablet by mouth 3 times daily 10/30/20  Yes ANICETO Abbott CNP   atorvastatin (LIPITOR) 80 MG tablet Take 1 tablet by mouth nightly 10/28/20  Yes Cyril Olivas MD   aspirin 81 MG EC tablet Take 1 tablet by mouth daily 9/30/20  Yes ANICETO Willis CNP   albuterol (PROVENTIL) (2.5 MG/3ML) 0.083% nebulizer solution Take 3 Weight: 283 lb 4.8 oz (128.5 kg)       PHYSICAL EXAM:      General:  Alert, cooperative, no distress, appears stated age   Head:  Normocephalic, atraumatic   Eyes:  Conjunctiva/corneas clear, anicteric sclerae    Nose: Nares normal, no drainage or sinus tenderness   Throat: No abnormalities of the lips, oral mucosa or tongue. Neck: Trachea midline. Neck supple with no lymphadenopathy, thyroid not enlarged, symmetric, no tenderness/mass/nodules, flat neck vv   Lungs:   Clear to auscultation bilaterally, no wheezes, no rales, no respiratory distress   Chest Wall:  Chest wall tenderness to palpation. Heart:  Regular rate and rhythm, normal S1, normal S2, no murmur, no rub, no S3/S4, PMI non-palpable. Abdomen:   Obese, Soft, non-tender, with normoactive bowel sounds. No masses, no hepatosplenomegaly   Extremities: No cyanosis, clubbing or pitting edema. Vascular: 2+ radial, brachial, femoral, dorsalis pedis and posterior tibial pulses bilaterally. Brisk carotid upstrokes without carotid bruit. Skin: Skin color, texture, turgor are normal with no rashes or ulceration. Pysch: Euthymic mood, appropriate affect   Neurologic: Oriented to person, place and time. No slurred speech or facial asymmetry. No motor or sensory deficits on gross examination.          Labs:   CBC:   Lab Results   Component Value Date    WBC 10.6 04/11/2021    RBC 4.27 04/11/2021    HGB 12.8 04/12/2021    HCT 38.5 04/12/2021    MCV 88.4 04/11/2021    RDW 17.8 04/11/2021     04/11/2021     CMP:  Lab Results   Component Value Date     04/11/2021    K 4.5 04/11/2021    K 4.9 04/10/2021     04/11/2021    CO2 29 04/11/2021    BUN 14 04/11/2021    CREATININE 1.0 04/11/2021    GFRAA >60 04/11/2021    GFRAA >60 03/16/2011    AGRATIO 1.3 04/08/2021    LABGLOM >60 04/11/2021    GLUCOSE 103 04/11/2021    PROT 6.3 04/09/2021    PROT 6.9 03/16/2011    CALCIUM 9.1 04/11/2021    BILITOT 0.5 04/09/2021    ALKPHOS 92 04/09/2021 AST 13 04/09/2021    ALT 16 04/09/2021     PT/INR:  No results found for: PTINR  HgBA1c:  Lab Results   Component Value Date    LABA1C 6.0 09/25/2020     Lab Results   Component Value Date    TROPONINI <0.01 04/09/2021         Interval Testing/Data:     4/13/21 Lexiscan  Summary    Large area, reversible, moderate to severe intensity perfusion defect    involving the basal to apical anterolateral, mid to apical anterior and    apex. Mixed ischemia/scar of the inferolateral territory. Decreased wall    motion and myocardial thickening of the above segments. Post-stress LVEF    visually estimated 45-50%. Overall findings represent a high risk scan. Limited echo 3/12/21  Conclusions      Summary   Limited echo for left ventricle systolic function. Definity is used for   myocardial border enhancement. LV systolic function is mildly reduced with a visually estimated EF=45-50%. The left ventricle is normal in size with normal wall thickness. More prominent Inferior HK on certain views. Indeterminate diastolic function.     Limited ECHO 9/29/20   Summary   Limited only f/u for LVEF post op CABG   Technically difficult examination.   Normal left ventricular systolic function with ejection fraction of 55-60%.   No regional wall motion abnormalites are seen.   Compared to previous study from 8- no changes noted in left   ventricular function.     Lexiscan 9/22/20   Summary     Increase left ventricular systolic volume with hypokinesis/decreased     myocardial thickening of the inferolateral segment. Large area of inducible     ischemia of the LAD and left circumflex territories. Post-stress LVEF normal     at 56%.     Overall findings represent highly abnormal study, high risk scan. [facility] 9/22/20  1. Left main coronary artery has moderate calcific disease.  It gave off the left anterior descending artery and left circumflex.     2. Left anterior descending artery has severe atherosclerotic disease.  It was moderate in size. It gave off a large early diagonal branch that has ostial 90%. The LAD covered the entire apex of the left ventricle.      3. Left circumflex has severe atherosclerotic disease.  It was moderate in size. There was a moderate sized obtuse marginal branch that is occluded.     4. Right coronary artery has mild atherosclerotic disease. It was large in size and was the dominant artery.             ~there is faint collaterals to the LAD     5. Left ventriculogram showed normal LVEF at 55-60%. Wall motion was normal . There was no significant mitral valve or aortic valve disease noted. LVEDP was normal. There was no gradient noted across the aortic valve during pullback of the catheter.        ECHO 8/19/20   Summary   LV systolic function is normal with EF estimated at 55-60%. Endocardium not   entirely well visualized but no obvious segmental wall motion abnormalities.   There is moderate concentric left ventricular hypertrophy.   Normal left ventricular diastolic filling pressure.   Mild mitral annular calcification.   Valves are not entirely well visualized but there is no obvious structural   abnormality or significant color flow abnormality noted on doppler.   Unable to obtain SPAP due to lack of tricuspid regurgitation. Impression and Plan      1. Angina with dyspnea/fatigue and significant exertional intolerance ongoing prior to admission  2. Ischemic Cardiomyopathy with decline in LVEF following bypass  3. Positive stress - similar in appearance to that performed prior to bypass  4. CAD status post bypass for unstable angina, LIMA-LAD, SVG-OM, SVG-Diagonal 9/2020  5. Mobitz I with occasional 2:1 block   6. History of congestive heart failure - volume depleted this admission, now euvolemic   7. Diverticulitis -treatment ongoing, continues have blood loss in stools with stable H/H since admission, 12.8 today  8. DENIA - resolved   9. Lisinopril intolerance due to cough  10.  No Beta blocker due to #5      Patient Active Problem List   Diagnosis    Crush injury of right foot    Shortness of breath    Chest pain    Erectile dysfunction    Hyperglycemia    Anxiety    Depression    Tobacco abuse    History of alcohol abuse    Fatigue    OANH (obstructive sleep apnea)    Hypersomnia    Chronic obstructive pulmonary disease (HCC)    Lumbar radiculopathy    HNP (herniated nucleus pulposus), lumbar    Spondylosis without myelopathy or radiculopathy, lumbar region    DDD (degenerative disc disease), lumbar    Lumbar spine pain    Acute respiratory distress    Class 3 severe obesity with body mass index (BMI) of 45.0 to 49.9 in adult ([address]    Pneumonia, primary atypical    Acute respiratory failure with hypoxia ([address]    Moderate protein-calorie malnutrition (HCC)    Acute respiratory failure (HCC)    Acute on chronic respiratory failure with hypoxemia (HCC)    Acute diastolic congestive heart failure (HCC)    Hyperlipidemia    Insomnia    Morbid obesity with BMI of 45.0-49.9, adult (HCC)    Abnormal cardiovascular stress test    Coronary artery disease involving native heart with angina pectoris ([address]    S/P three vessel coronary artery bypass    Acute kidney injury ([facility]    Acute diverticulitis    Chronic systolic congestive heart failure (HCC)    Mobitz type I Wenckebach atrioventricular block    Asystole (HCC)    Ischemic cardiomyopathy    Diverticulitis of colon       PLAN:  1. Transfer to [facility] tomorrow for [facility] given ongoing symptoms, limited mobility due to fatigue/Dyspnea, stress test abnormalities, which appear unchanged previous to bypass; will discuss with interventional cardiology the timing of cath given ongoing GI care   2. Hold AM lovenox, NPO at MN  3. Holding BB for Mobitz suspected type I but with occasional 2:1 block by tele. Previously noted 3 sequential non-conducted beats by tele this admission. No high grade block sustained.  EP consultation on transfer as he has indication for BB therapy given CAD and cardiomyopathy  4. Continues on aspirin daily; plavix held   5. Continue statin   6. BP at goal   7. Rx diverticulitis per primary team.   8. Monitor rhythms    Plan transfer tomorrow pending discussion with interventional.      I will address the patient's cardiac risk factors and adjusted pharmacologic treatment as needed. In addition, I have reinforced the need for patient directed risk factor modification. All questions and concerns were addressed to the patient/family. Alternatives to my treatment were discussed. Thank you for allowing us to participate in the care of Danell Felty. Please call me with any questions [phone].     Jerri Philippe MD, [facility]  Cardiovascular Disease  [facility] [address]  [phone] [facility]  [phone] [address] [place] [address]  4/13/2021 11:11 AM

## 2021-04-13 NOTE — FLOWSHEET NOTE
04/13/21 0833   Vitals   Temp 97 °F (36.1 °C)   Temp Source Oral   Pulse 82   Heart Rate Source Monitor   Resp 16   /78   Level of Consciousness Alert (0)   MEWS Score 1   Oxygen Therapy   SpO2 91 %   O2 Device None (Room air)   Vital signs stable. Pt is alert and oriented and complains of chest pain rated 5/10 at the moment. PRN morphine provided per Pt request.  Nothing new noted on head to toe assessment. Pt is NSR on the monitor. Morning PO medication administration held pending stress test. Pt denies any further assistance at the moment. Will continue to monitor.

## 2021-04-14 ENCOUNTER — HOSPITAL ENCOUNTER (INPATIENT)
Dept: CARDIAC CATH/INVASIVE PROCEDURES | Age: 61
LOS: 6 days | Discharge: HOME | DRG: 215 | End: 2021-04-20
Attending: INTERNAL MEDICINE | Admitting: INTERNAL MEDICINE
Payer: COMMERCIAL

## 2021-04-14 VITALS
RESPIRATION RATE: 16 BRPM | SYSTOLIC BLOOD PRESSURE: 157 MMHG | WEIGHT: 283.1 LBS | DIASTOLIC BLOOD PRESSURE: 80 MMHG | HEART RATE: 96 BPM | OXYGEN SATURATION: 94 % | HEIGHT: 64 IN | TEMPERATURE: 97.7 F | BODY MASS INDEX: 48.33 KG/M2

## 2021-04-14 DIAGNOSIS — I44.2 CHB (COMPLETE HEART BLOCK) (HCC): Primary | ICD-10-CM

## 2021-04-14 PROBLEM — I25.10 CAD IN NATIVE ARTERY: Status: ACTIVE | Noted: 2021-04-14

## 2021-04-14 LAB
LV EF: 60 %
LVEF MODALITY: NORMAL

## 2021-04-14 PROCEDURE — 2500000003 HC RX 250 WO HCPCS

## 2021-04-14 PROCEDURE — 2580000003 HC RX 258: Performed by: INTERNAL MEDICINE

## 2021-04-14 PROCEDURE — 2500000003 HC RX 250 WO HCPCS: Performed by: HOSPITALIST

## 2021-04-14 PROCEDURE — 93459 L HRT ART/GRFT ANGIO: CPT | Performed by: INTERNAL MEDICINE

## 2021-04-14 PROCEDURE — 6360000002 HC RX W HCPCS

## 2021-04-14 PROCEDURE — B2151ZZ FLUOROSCOPY OF LEFT HEART USING LOW OSMOLAR CONTRAST: ICD-10-PCS | Performed by: INTERNAL MEDICINE

## 2021-04-14 PROCEDURE — 2580000003 HC RX 258: Performed by: HOSPITALIST

## 2021-04-14 PROCEDURE — C1894 INTRO/SHEATH, NON-LASER: HCPCS

## 2021-04-14 PROCEDURE — 4A023N7 MEASUREMENT OF CARDIAC SAMPLING AND PRESSURE, LEFT HEART, PERCUTANEOUS APPROACH: ICD-10-PCS | Performed by: INTERNAL MEDICINE

## 2021-04-14 PROCEDURE — 99223 1ST HOSP IP/OBS HIGH 75: CPT | Performed by: INTERNAL MEDICINE

## 2021-04-14 PROCEDURE — 6360000002 HC RX W HCPCS: Performed by: INTERNAL MEDICINE

## 2021-04-14 PROCEDURE — 6360000004 HC RX CONTRAST MEDICATION

## 2021-04-14 PROCEDURE — 2060000000 HC ICU INTERMEDIATE R&B

## 2021-04-14 PROCEDURE — 99238 HOSP IP/OBS DSCHRG MGMT 30/<: CPT | Performed by: PHYSICIAN ASSISTANT

## 2021-04-14 PROCEDURE — 93459 L HRT ART/GRFT ANGIO: CPT

## 2021-04-14 PROCEDURE — 6360000002 HC RX W HCPCS: Performed by: PHYSICIAN ASSISTANT

## 2021-04-14 PROCEDURE — C1769 GUIDE WIRE: HCPCS

## 2021-04-14 PROCEDURE — 6370000000 HC RX 637 (ALT 250 FOR IP): Performed by: INTERNAL MEDICINE

## 2021-04-14 PROCEDURE — 2500000003 HC RX 250 WO HCPCS: Performed by: INTERNAL MEDICINE

## 2021-04-14 PROCEDURE — B2111ZZ FLUOROSCOPY OF MULTIPLE CORONARY ARTERIES USING LOW OSMOLAR CONTRAST: ICD-10-PCS | Performed by: INTERNAL MEDICINE

## 2021-04-14 PROCEDURE — B2131ZZ FLUOROSCOPY OF MULTIPLE CORONARY ARTERY BYPASS GRAFTS USING LOW OSMOLAR CONTRAST: ICD-10-PCS | Performed by: INTERNAL MEDICINE

## 2021-04-14 PROCEDURE — 99153 MOD SED SAME PHYS/QHP EA: CPT

## 2021-04-14 PROCEDURE — 6370000000 HC RX 637 (ALT 250 FOR IP): Performed by: HOSPITALIST

## 2021-04-14 PROCEDURE — 2709999900 HC NON-CHARGEABLE SUPPLY

## 2021-04-14 PROCEDURE — 99152 MOD SED SAME PHYS/QHP 5/>YRS: CPT

## 2021-04-14 PROCEDURE — 85347 COAGULATION TIME ACTIVATED: CPT

## 2021-04-14 RX ORDER — MIDAZOLAM HYDROCHLORIDE 1 MG/ML
INJECTION INTRAMUSCULAR; INTRAVENOUS
Status: COMPLETED | OUTPATIENT
Start: 2021-04-14 | End: 2021-04-14

## 2021-04-14 RX ORDER — SODIUM CHLORIDE 9 MG/ML
25 INJECTION, SOLUTION INTRAVENOUS PRN
Status: DISCONTINUED | OUTPATIENT
Start: 2021-04-14 | End: 2021-04-15 | Stop reason: SDUPTHER

## 2021-04-14 RX ORDER — PROMETHAZINE HYDROCHLORIDE 25 MG/1
12.5 TABLET ORAL EVERY 6 HOURS PRN
Status: DISCONTINUED | OUTPATIENT
Start: 2021-04-14 | End: 2021-04-20 | Stop reason: HOSPADM

## 2021-04-14 RX ORDER — MORPHINE SULFATE 4 MG/ML
2 INJECTION, SOLUTION INTRAMUSCULAR; INTRAVENOUS
Status: DISCONTINUED | OUTPATIENT
Start: 2021-04-14 | End: 2021-04-20 | Stop reason: HOSPADM

## 2021-04-14 RX ORDER — ONDANSETRON 2 MG/ML
4 INJECTION INTRAMUSCULAR; INTRAVENOUS EVERY 6 HOURS PRN
Status: DISCONTINUED | OUTPATIENT
Start: 2021-04-14 | End: 2021-04-20 | Stop reason: HOSPADM

## 2021-04-14 RX ORDER — AMOXICILLIN AND CLAVULANATE POTASSIUM 875; 125 MG/1; MG/1
1 TABLET, FILM COATED ORAL 2 TIMES DAILY
Qty: 10 TABLET | Refills: 0 | Status: ON HOLD | OUTPATIENT
Start: 2021-04-14 | End: 2021-04-20 | Stop reason: HOSPADM

## 2021-04-14 RX ORDER — ALBUTEROL SULFATE 2.5 MG/3ML
2.5 SOLUTION RESPIRATORY (INHALATION) EVERY 6 HOURS PRN
Status: DISCONTINUED | OUTPATIENT
Start: 2021-04-14 | End: 2021-04-20 | Stop reason: HOSPADM

## 2021-04-14 RX ORDER — SODIUM CHLORIDE 9 MG/ML
25 INJECTION, SOLUTION INTRAVENOUS PRN
Status: DISCONTINUED | OUTPATIENT
Start: 2021-04-14 | End: 2021-04-20 | Stop reason: HOSPADM

## 2021-04-14 RX ORDER — ACETAMINOPHEN 650 MG/1
650 SUPPOSITORY RECTAL EVERY 6 HOURS PRN
Status: DISCONTINUED | OUTPATIENT
Start: 2021-04-14 | End: 2021-04-20 | Stop reason: HOSPADM

## 2021-04-14 RX ORDER — POLYETHYLENE GLYCOL 3350 17 G/17G
17 POWDER, FOR SOLUTION ORAL DAILY PRN
Status: DISCONTINUED | OUTPATIENT
Start: 2021-04-14 | End: 2021-04-20 | Stop reason: HOSPADM

## 2021-04-14 RX ORDER — CLOPIDOGREL BISULFATE 75 MG/1
75 TABLET ORAL DAILY
Status: DISCONTINUED | OUTPATIENT
Start: 2021-04-15 | End: 2021-04-20 | Stop reason: HOSPADM

## 2021-04-14 RX ORDER — ASPIRIN 81 MG/1
81 TABLET ORAL DAILY
Status: DISCONTINUED | OUTPATIENT
Start: 2021-04-15 | End: 2021-04-20 | Stop reason: HOSPADM

## 2021-04-14 RX ORDER — ATORVASTATIN CALCIUM 80 MG/1
80 TABLET, FILM COATED ORAL NIGHTLY
Status: DISCONTINUED | OUTPATIENT
Start: 2021-04-14 | End: 2021-04-20 | Stop reason: HOSPADM

## 2021-04-14 RX ORDER — SODIUM CHLORIDE 0.9 % (FLUSH) 0.9 %
5-40 SYRINGE (ML) INJECTION EVERY 12 HOURS SCHEDULED
Status: DISCONTINUED | OUTPATIENT
Start: 2021-04-14 | End: 2021-04-15 | Stop reason: SDUPTHER

## 2021-04-14 RX ORDER — SODIUM CHLORIDE 0.9 % (FLUSH) 0.9 %
5-40 SYRINGE (ML) INJECTION EVERY 12 HOURS SCHEDULED
Status: DISCONTINUED | OUTPATIENT
Start: 2021-04-14 | End: 2021-04-20

## 2021-04-14 RX ORDER — HEPARIN SODIUM 1000 [USP'U]/ML
INJECTION, SOLUTION INTRAVENOUS; SUBCUTANEOUS
Status: COMPLETED | OUTPATIENT
Start: 2021-04-14 | End: 2021-04-14

## 2021-04-14 RX ORDER — SODIUM CHLORIDE 0.9 % (FLUSH) 0.9 %
5-40 SYRINGE (ML) INJECTION PRN
Status: DISCONTINUED | OUTPATIENT
Start: 2021-04-14 | End: 2021-04-15 | Stop reason: SDUPTHER

## 2021-04-14 RX ORDER — ACETAMINOPHEN 325 MG/1
650 TABLET ORAL EVERY 4 HOURS PRN
Status: DISCONTINUED | OUTPATIENT
Start: 2021-04-14 | End: 2021-04-20 | Stop reason: HOSPADM

## 2021-04-14 RX ORDER — FENTANYL CITRATE 50 UG/ML
INJECTION, SOLUTION INTRAMUSCULAR; INTRAVENOUS
Status: COMPLETED | OUTPATIENT
Start: 2021-04-14 | End: 2021-04-14

## 2021-04-14 RX ORDER — TRAZODONE HYDROCHLORIDE 50 MG/1
100 TABLET ORAL NIGHTLY
Status: DISCONTINUED | OUTPATIENT
Start: 2021-04-14 | End: 2021-04-20 | Stop reason: HOSPADM

## 2021-04-14 RX ORDER — SODIUM CHLORIDE 0.9 % (FLUSH) 0.9 %
5-40 SYRINGE (ML) INJECTION PRN
Status: DISCONTINUED | OUTPATIENT
Start: 2021-04-14 | End: 2021-04-20 | Stop reason: HOSPADM

## 2021-04-14 RX ORDER — ACETAMINOPHEN 325 MG/1
650 TABLET ORAL EVERY 6 HOURS PRN
Status: DISCONTINUED | OUTPATIENT
Start: 2021-04-14 | End: 2021-04-20 | Stop reason: HOSPADM

## 2021-04-14 RX ADMIN — ATORVASTATIN CALCIUM 80 MG: 80 TABLET, FILM COATED ORAL at 21:03

## 2021-04-14 RX ADMIN — HEPARIN SODIUM 5000 UNITS: 1000 INJECTION, SOLUTION INTRAVENOUS; SUBCUTANEOUS at 15:39

## 2021-04-14 RX ADMIN — FENTANYL CITRATE 50 MCG: 50 INJECTION, SOLUTION INTRAMUSCULAR; INTRAVENOUS at 16:13

## 2021-04-14 RX ADMIN — TRAZODONE HYDROCHLORIDE 100 MG: 50 TABLET ORAL at 21:03

## 2021-04-14 RX ADMIN — MORPHINE SULFATE 2 MG: 4 INJECTION, SOLUTION INTRAMUSCULAR; INTRAVENOUS at 17:13

## 2021-04-14 RX ADMIN — Medication 10 ML: at 08:12

## 2021-04-14 RX ADMIN — Medication 10 ML: at 21:03

## 2021-04-14 RX ADMIN — MORPHINE SULFATE 2 MG: 2 INJECTION, SOLUTION INTRAMUSCULAR; INTRAVENOUS at 03:28

## 2021-04-14 RX ADMIN — MIDAZOLAM HYDROCHLORIDE 2 MG: 1 INJECTION INTRAMUSCULAR; INTRAVENOUS at 16:13

## 2021-04-14 RX ADMIN — METRONIDAZOLE 500 MG: 500 INJECTION, SOLUTION INTRAVENOUS at 17:13

## 2021-04-14 RX ADMIN — MORPHINE SULFATE 2 MG: 4 INJECTION, SOLUTION INTRAMUSCULAR; INTRAVENOUS at 21:03

## 2021-04-14 RX ADMIN — MIDAZOLAM HYDROCHLORIDE 2 MG: 1 INJECTION INTRAMUSCULAR; INTRAVENOUS at 15:37

## 2021-04-14 RX ADMIN — METRONIDAZOLE 500 MG: 500 INJECTION, SOLUTION INTRAVENOUS at 00:17

## 2021-04-14 RX ADMIN — MIDAZOLAM HYDROCHLORIDE 2 MG: 1 INJECTION INTRAMUSCULAR; INTRAVENOUS at 15:20

## 2021-04-14 RX ADMIN — FENTANYL CITRATE 50 MCG: 50 INJECTION, SOLUTION INTRAMUSCULAR; INTRAVENOUS at 15:37

## 2021-04-14 RX ADMIN — SODIUM CHLORIDE 25 ML: 900 INJECTION INTRAVENOUS at 00:17

## 2021-04-14 RX ADMIN — ASPIRIN 81 MG: 81 TABLET, COATED ORAL at 08:12

## 2021-04-14 RX ADMIN — METOPROLOL TARTRATE 25 MG: 25 TABLET, FILM COATED ORAL at 21:03

## 2021-04-14 RX ADMIN — HEPARIN SODIUM 2000 UNITS: 1000 INJECTION, SOLUTION INTRAVENOUS; SUBCUTANEOUS at 15:57

## 2021-04-14 RX ADMIN — CEFTRIAXONE SODIUM 1000 MG: 1 INJECTION, POWDER, FOR SOLUTION INTRAMUSCULAR; INTRAVENOUS at 17:13

## 2021-04-14 RX ADMIN — FENTANYL CITRATE 50 MCG: 50 INJECTION, SOLUTION INTRAMUSCULAR; INTRAVENOUS at 15:20

## 2021-04-14 ASSESSMENT — PAIN DESCRIPTION - LOCATION: LOCATION: CHEST

## 2021-04-14 ASSESSMENT — PAIN SCALES - GENERAL
PAINLEVEL_OUTOF10: 6
PAINLEVEL_OUTOF10: 7

## 2021-04-14 NOTE — PROGRESS NOTES
Monitor room alerted pt switched from NSR to 2nd degree heart block type 2 for 8-10 seconds. Pt asymptomatic, VSS.  Currently NSR at 91bpm. Maurice Danielle RN

## 2021-04-14 NOTE — PROGRESS NOTES
RESPIRATORY THERAPY ASSESSMENT    Name:  Argenis Amanda  Medical Record Number:  [id number]  Age: 64 y.o. Gender: male  : 1960  Today's Date:  2021  Room:  [facility]/5878-27    Assessment     Is the patient being admitted for a COPD or Asthma exacerbation? No   (If yes the patient will be seen every 4 hours for the first 24 hours and then reassessed)    Patient Admission Diagnosis      Allergies  Allergies   Allergen Reactions    Dilaudid [Hydromorphone Hcl] Nausea And Vomiting    Codeine Itching       Minimum Predicted Vital Capacity:     na          Actual Vital Capacity:      na              Pulmonary History:none in hx copd expected  Home Oxygen Therapy:  room air  Home Respiratory Therapy:Albuterol prnCurrent Respiratory Therapy:  Albuterol PRN          Respiratory Severity Index(RSI)   Patients with orders for inhalation medications, oxygen, or any therapeutic treatment modality will be placed on Respiratory Protocol. They will be assessed with the first treatment and at least every 72 hours thereafter. The following severity scale will be used to determine frequency of treatment intervention.     Smoking History: Pulmonary Disease or Smoking History, Greater than 15 pack year = 2    Social History  Social History     Tobacco Use    Smoking status: Former Smoker     Packs/day: 2.00     Years: 35.00     Pack years: 70.00     Types: Cigarettes     Quit date: 3/1/2020     Years since quittin.1    Smokeless tobacco: Never Used   Substance Use Topics    Alcohol use: No    Drug use: Not Currently     Comment: hx of drug use       Recent Surgical History: None = 0  Past Surgical History  Past Surgical History:   Procedure Laterality Date    CHOLECYSTECTOMY      CORONARY ARTERY BYPASS GRAFT N/A 2020    CORONARY ARTERY BYPASS GRAFTING X3, INTERNAL MAMMARY ARTERY, SAPHENOUS VEIN GRAFT, ON PUMP, STERNAL PLATING, 5 LEVEL BILATERAL INTERCOSTAL NERVE BLOCK, PLATELET GEL APPLICATION performed by Evan Pastrana MD at [address]  03/16/2011    cystoscopy left ureteroscopy, left retrograde, double J stent placement    FINGER AMPUTATION Right 2/2/2021    RIGHT MIDDLE FINGER IRRIGATION AND DEBRIDEMENT WITH REVISION AMPUTATION AND BONE SHORTENING, POSSIBLE NAIL ABLATION [facility]  [facility] 12/24/2019    RIGHT LUMBAR FIVE SACRAL ONE TRANSFORAMINAL EPIDURAL STEROID INJECTION SITE CONFIRMED BY FLUOROSCOPY performed by Black Coronel MD at [facility] Right 1/7/2020    RIGHT LUMBAR FOUR AND LUMBAR FIVE TRANSFORAMINAL EPIDURAL STEROID INJECTION SITE CONFIRMED BY FLUOROSCOPY performed by Black Coronel MD at [place] [address]       Level of Consciousness: Alert, Oriented, and Cooperative = 0    Level of Activity: Walking with assistance = 1    Respiratory Pattern: Regular Pattern; RR 8-20 = 0    Breath Sounds: Diminshed bilaterally and/or crackles = 2    Sputum   ,  ,    Cough: Strong, productive = 1    Vital Signs   /73   Pulse 74   Temp 98.1 °F (36.7 °C) (Oral)   Resp 16   Ht 5' 4\" (1.626 m)   Wt 283 lb (128.4 kg)   SpO2 94%   BMI 48.58 kg/m²   SPO2 (COPD values may differ): Greater than or equal to 92% on room air = 0    Peak Flow (asthma only): not applicable = 0    RSI: 0-4 = See once and convert to home regimen or discontinue        Plan       Goals: medication delivery, mobilize retained secretions, volume expansion and improve oxygenation    Patient/caregiver was educated on the proper method of use for Respiratory Care Devices:  Yes      Level of patient/caregiver understanding able to:   ? Verbalize understanding   ? Demonstrate understanding       ? Teach back        ? Needs reinforcement       ? No available caregiver               ? Other:     Response to education:  Good     Is patient being placed on Home Treatment Regimen?   Yes     Does the patient have everything they need prior to discharge? Yes     Comments: pt assessed     Plan of Care: PRN per home Albuterol    Electronically signed by Valeria Chowdhury RCP on 4/14/2021 at 4:58 PM    Respiratory Protocol Guidelines     1. Assessment and treatment by Respiratory Therapy will be initiated for medication and therapeutic interventions upon initiation of aerosolized medication. 2. Physician will be contacted for respiratory rate (RR) greater than 35 breaths per minute. Therapy will be held for heart rate (HR) greater than 140 beats per minute, pending direction from physician. 3. Bronchodilators will be administered via Metered Dose Inhaler (MDI) with spacer when the following criteria are met:  a. Alert and cooperative     b. HR < 140 bpm  c. RR < 30 bpm                d. Can demonstrate a 2-3 second inspiratory hold  4. Bronchodilators will be administered via Hand Held Nebulizer HAYLEE [facility]) to patients when ANY of the following criteria are met  a. Incognizant or uncooperative          b. Patients treated with HHN at Home        c. Unable to demonstrate proper use of MDI with spacer     d. RR > 30 bpm   5. Bronchodilators will be delivered via Metered Dose Inhaler (MDI), HHN, Aerogen to intubated patients on mechanical ventilation. 6. Inhalation medication orders will be delivered and/or substituted as outlined below. Aerosolized Medications Ordering and Administration Guidelines:    1. All Medications will be ordered by a physician, and their frequency and/or modality will be adjusted as defined by the patients Respiratory Severity Index (RSI) score. 2. If the patient does not have documented COPD, consider discontinuing anticholinergics when RSI is less than 9.  3. If the bronchospasm worsens (increased RSI), then the bronchodilator frequency can be increased to a maximum of every 4 hours. If greater than every 4 hours is required, the physician will be contacted.   4. If the bronchospasm improves, the frequency of the bronchodilator can be decreased, based on the patient's RSI, but not less than home treatment regimen frequency. 5. Bronchodilator(s) will be discontinued if patient has a RSI less than 9 and has received no scheduled or as needed treatment for 72  Hrs. Patients Ordered on a Mucolytic Agent:    1. Must always be administered with a bronchodilator. 2. Discontinue if patient experiences worsened bronchospasm, or secretions have lessened to the point that the patient is able to clear them with a cough. Anti-inflammatory and Combination Medications:    1. If the patient lacks prior history of lung disease, is not using inhaled anti-inflammatory medication at home, and lacks wheezing by examination or by history for at least 24 hours, contact physician for possible discontinuation.

## 2021-04-14 NOTE — FLOWSHEET NOTE
04/14/21 0811   Vital Signs   Temp 97.7 °F (36.5 °C)   Temp Source Oral   Pulse 96   Heart Rate Source Monitor   Resp 16   BP (!) 157/80   BP Location Left lower arm   Level of Consciousness Alert (0)   MEWS Score 1   Oxygen Therapy   SpO2 94 %   O2 Device None (Room air)     Pt. Resting sitting on side of bed. No signs of distress noted. Pt. States that he overall does not feel well. Appears anxious regarding scheduled heart cath. Pt. Assessment completed- see flow sheet. Pt. Given ordered ASA with small sip of water. Pt. denies further needs at this time. Will continue to monitor. Call light is within reach.   Diamond Fowler RN

## 2021-04-14 NOTE — PROGRESS NOTES
Lung sounds clear bilaterally. LLQ abd pain. Dyspnea with exertion. Pt reports some anxiety about cardiac cath tomorrow AM. Consoled and answered questions. No other needs or discomforts stated at this time. Call light in easy reach, bedside table in easy reach, and bed in lowest position.   Yanira Sesay RN

## 2021-04-14 NOTE — PROGRESS NOTES
Brief Pre-Op Note/Sedation Assessment      Arleth Abernathy  1960  Cath Pool Rm/NONE      [id number]  3:30 PM    Planned Procedure: Cardiac Catheterization Procedure    Post Procedure Plan: Return to same level of care    Consent: I have discussed with the patient and/or the patient representative the indication, alternatives, and the possible risks and/or complications of the planned procedure and the anesthesia methods. The patient and/or patient representative appear to understand and agree to proceed. Chief Complaint: Anginal Equivalent  Dyspnea      Indications for Cath Procedure:  ACS > 24 hrs  Anginal Classification within 2 weeks:  CCS IV - Inability to perform any activity without angina or angina at rest, i.e., severe limitation  NYHA Heart Failure Class within 2 weeks: No symptoms  Is Cath Lab Visit Valve-related?: No  Surgical Risk: N/A  Functional Type: N/A    Anti- Anginal Meds within 2 weeks:   Yes: Aspirin, Non-Statin (Any) and Statin (Any)    Stress or Imaging Studies Performed (within 6 months):  Stress Test with SPECT Result: Positive:  anterior Risk/Extent of Ischemia:  High     Vital Signs:  Ht 5' 4\" (1.626 m)   Wt 283 lb (128.4 kg)   BMI 48.58 kg/m²     Allergies:   Allergies   Allergen Reactions    Dilaudid [Hydromorphone Hcl] Nausea And Vomiting    Codeine Itching       Past Medical History:  Past Medical History:   Diagnosis Date    Anxiety 1/6/2020    Aortic valve calcification 09/2020    seen on lung CT    Chronic obstructive pulmonary disease ([facility] 9/3/2019    PFT done 9/03/19    Coronary artery calcification 09/2020    seen on lung ct    Crush injury of right foot 7/23/2015    DDD (degenerative disc disease), lumbar 1/6/2020    Erectile dysfunction 1/6/2020    Fatigue 1/6/2020    History of alcohol abuse 1/6/2020    History of drug use     HNP (herniated nucleus pulposus), lumbar 1/6/2020    Hyperglycemia 1/6/2020    Hypersomnia 1/6/2020    Kidney stone  Lumbar radiculopathy 1/6/2020    Lumbar spine pain 1/6/2020    LVH (left ventricular hypertrophy)     seen on echo 8/2020, moderate    Observed sleep apnea 1/6/2020    Reactive depression 1/6/2020    Spondylosis without myelopathy or radiculopathy, lumbar region 1/6/2020    Tobacco use 1/6/2020         Surgical History:  Past Surgical History:   Procedure Laterality Date    CHOLECYSTECTOMY      CORONARY ARTERY BYPASS GRAFT N/A 9/25/2020    CORONARY ARTERY BYPASS GRAFTING X3, INTERNAL MAMMARY ARTERY, SAPHENOUS VEIN GRAFT, ON PUMP, STERNAL PLATING, 5 LEVEL BILATERAL INTERCOSTAL NERVE BLOCK, PLATELET GEL APPLICATION performed by Ace Stevens MD at [facility]  03/16/2011    cystoscopy left ureteroscopy, left retrograde, double J stent placement    FINGER AMPUTATION Right 2/2/2021    RIGHT MIDDLE FINGER IRRIGATION AND DEBRIDEMENT WITH REVISION AMPUTATION AND BONE SHORTENING, POSSIBLE NAIL ABLATION performed by Amor Schmidt MD at [facility] Right 12/24/2019    RIGHT LUMBAR FIVE SACRAL ONE TRANSFORAMINAL EPIDURAL STEROID INJECTION SITE CONFIRMED BY FLUOROSCOPY performed by Carlene Salinas MD at [facility] Right 1/7/2020    RIGHT LUMBAR FOUR AND LUMBAR FIVE TRANSFORAMINAL EPIDURAL STEROID INJECTION SITE CONFIRMED BY FLUOROSCOPY performed by Carlene Salinas MD at [place] [address]         Medications:  No current facility-administered medications for this encounter. Pre-Sedation:    Pre-Sedation Documentation and Exam:  I have assessed the patient and agree with the H&P present on the chart. Prior History of Anesthesia Complications:   none    Modified Mallampati:  III (soft palate, base of uvula visible)    ASA Classification:  Class 3 - A patient with severe systemic disease that limits activity but is not incapacitating      Adele Scale:   Activity:  2 - Able to move 4 extremities voluntarily on command  Respiration:  2 - Able to breathe deeply and cough freely  Circulation:  2 - BP+/- 20mmHg of normal  Consciousness:  2 - Fully awake  Oxygen Saturation (color):  2 - Able to maintain oxygen saturation >92% on room air    Sedation/Anesthesia Plan:  Guard the patient's safety and welfare. Minimize physical discomfort and pain. Minimize negative psychological responses to treatment by providing sedation and analgesia and maximize the potential amnesia. Patient to meet pre-procedure discharge plan.     Medication Planned:  midazolam intravenously and fentanyl intravenously    Patient is an appropriate candidate for plan of sedation: yes      Electronically signed by Kelly Medina MD on 4/14/2021 at 3:30 PM

## 2021-04-14 NOTE — PROGRESS NOTES
Report called to Vidhya Hardy, nurse at cath lab. No questions at this time. Squad here to get pt. Pt. Su Sanders in hospital gown per request from Vidhya Hardy. Pt. Refusing to take off shorts. States he will do it there, but shirt removed and gown placed. Transport here to get pt. Pt. Stable when leaving facility.  Jose Pedro RN

## 2021-04-14 NOTE — DISCHARGE SUMMARY
Name:  Adam Banuelos  Room:  /6988-82  MRN:    [id number]    Discharge Summary      This discharge summary is in conjunction with a complete physical exam done on the day of discharge. Discharging Provider: Samson Castro PA-C    Admit: 4/8/2021  Discharge:   4/14/2021     HPI taken from admission H&P:    64 y.o. male presents with several weeks of feeling fatigued, sob with cough productive of yellow sputum. He also complains of intermittent precordial chest pain which worsens with coughing. Denies fever, chills. He noted onset of LLQ abdominal pain 2 days ago. Has had some nausea and vomiting of yellow emesis, (+) diarrhea with dark black stools noted last week which resolved. Patient was advised ER evaluation after calling his Cardiologist today. Currently he is awake, alert oriented, in no respiratory distress on room air. Diagnoses this Admission and Hospital Course     #Acute uncomplicated diverticulitis  -Treated with IV antibiotics Rocephin and flagyl D#5 with significant improvement. Advanced diet and he tolerated well. Will send with rx for Augmentin to complete 10 total days of antibiotics if he does not get admitted after procedure     #DENIA- resolved   - suspect pre renal with lasix use (dose increased to 80 mg BID by cardio on 3/3/21 2/2 patient's complaints of LEE), as well as intermittent vomiting, acute illness, and use of lisinopril  - no obstruction on CT  - Baseline Crt 0.8 (2/1/21). Crt 2.6 on admit-> 2.2-> 0.9 with IVF  - Held lasix and lisinopril       #CAD sp CABG  #Chest pain   #Bradyarrhythmia  - EKG is not ischemic. Trop trend negative. ACS ruled out   - Continued ASA, statin. Cardio held plavix   - stress test abnormal- transfer to [facility] for cardiac cath   - Episode of asystole and Mobitz type I heart block on 4/11/20210 cardio held BB.   Patient will need EP eval at [facility]     #Ischemic Cardiomyopathy  #Chronic systolic CHF  - EF 90-97%  - he is on Lasix 80mg BID at home- held with DENIA  - held lisinopril with DENIA  -Held toprol due to bradyarrhythmia  - appreciate cardio input     #COPD  - stopped smoking after his CABG in Sept 2020  - no exacerbation currently- cont nebulizers     #OANH  - not compliant with CPAP per chart review     #Morbid Obesity  - Body mass index is 48.63 kg/m². - Complicating assessment and treatment. Placing patient at risk for multiple co-morbidities as well as early death and contributing to the patient's presentation.   - Counseled on weight loss.       Procedures (Please Review Full Report for Details)  None     Consults    Cardiology     Physical Exam at Discharge:    BP (!) 157/80   Pulse 96   Temp 97.7 °F (36.5 °C) (Oral)   Resp 16   Ht 5' 4\" (1.626 m)   Wt 283 lb 1.6 oz (128.4 kg)   SpO2 94%   BMI 48.59 kg/m²     Gen: No distress. Alert. Awake and well-oriented. Eyes: PERRL. No sclera icterus. No conjunctival injection. ENT: No discharge. Pharynx clear. Neck: No JVD. Trachea midline. Resp: No accessory muscle use. No crackles. No wheezes. No rhonchi. CV: Regular rate. Regular rhythm. No murmur. No rub. No edema. Sternotomy scar noted  Chest wall is TTP  GI:  No tenderness. soft abdomen, no guarding or rigidity. Non-distended. Normal bowel sounds. Skin: Warm and dry. No nodule on exposed extremities. No rash on exposed extremities. M/S: No cyanosis. No joint deformity. No clubbing. Neuro: Awake. Grossly nonfocal    Psych: Oriented x 3. No anxiety or agitation.      CBC:   Recent Labs     04/11/21  2241 04/12/21  0257 04/13/21  1152   WBC 10.6  --  10.5   HGB 12.7* 12.8* 13.3*   HCT 37.8* 38.5* 40.4*   MCV 88.4  --  89.8     --  170     BMP:   Recent Labs     04/13/21  1153      K 4.5      CO2 30   BUN 10   CREATININE 0.9     RADIOLOGY  NM Cardiac Stress Test Nuclear Imaging   Final Result      CT ABDOMEN PELVIS WO CONTRAST Additional Contrast? None   Final Result   Acute, uncomplicated diverticulitis of the sigmoid colon. Nonobstructive left nephrolithiasis. XR CHEST PORTABLE   Final Result   No acute cardiopulmonary disease           Stress test 4/8/2021      Large area, reversible, moderate to severe intensity perfusion defect    involving the basal to apical anterolateral, mid to apical anterior and    apex. Mixed ischemia/scar of the inferolateral territory. Decreased wall    motion and myocardial thickening of the above segments. Post-stress LVEF    visually estimated 45-50%. Overall findings represent a high risk scan. Dispo: transfer to [facility] [place] in stable condition for cardiac cath and EP evaluation. I have written a paper rx for Augmentin to complete diverticulitis treatment.   Transfer center was called to alert [facility] [place] hospitalist     Milton Riley PA-C  4/14/2021 8:29 AM

## 2021-04-14 NOTE — PROCEDURES
CARDIAC CATHETERIZATION REPORT     Procedure Date:  2021  Patient Name: Tracee Hanley  MRN: [id number] : 1960      INDICATION     Unstable angina    PROCEDURES PERFORMED     Left heart catheterization  LVgram  Aortogram  Coronary angiogam  Coronary cath  Saphenous vein graft angiogram  LIMA angiogram          PROCEDURE DESCRIPTION   Risks/benefits/alternatives/outcomes were discussed with patient and/or family and informed consent was obtained. Using the [facility] scale, the patient's right radial artery was found to be a level B. Patient was prepped draped in the usual sterile fashion. Local anaesthetic was applied over puncture site. Using a back wall technique, a 6 Japanese Terumo sheath was inserted into right radial artery. Verapamil, nitroglycerin were administered through the sheath. Heparin was administered. Diagnostic 5 Western Janis pigtail, ultra, Humphrey, B-C catheters were used for diagnostic angiograms. Pigtail was used for left-ventricular angiogram and aortogram.  Ultra was used for RCA angiography as well as saphenous vein graft angiography. Claudia Earl was used for left main cannulation as well as to cannulate the left subclavian which was then ultimately exchanged out for a B-C catheter over a Glidewire. B-C catheter was used for LIMA angiography. At the conclusion of the procedure, a TR band was placed over the puncture site and hemostasis was obtained. There were no immediate complications. I supervised sedation with versed 2 mg/fentanyl 25 mcg during the procedure. 90 cc contrast was utilized. <20cc EBL. FINDINGS         LVGRAM    LVEDP  6   GRADIENT ACROSS AORTIC VALVE  none   LV FUNCTION EF 60%   WALL MOTION  normal   MITRAL REGURGITATION  mild       CORONARY ARTERIES    LM Proximal less than 10% stenosis, mid-distal haziness with calcification and 70% eccentric stenosis. LAD  Proximal-mid diffuse tubular 80% stenosis, there is mid-distal bidirectional flow noted. Distal vessel is visualized on LIMA angiogram, this shows 60% distal stenosis. D1 is a large vessel with tortuosity noted and ostial/proximal 70% stenosis followed by mid vessel calcification with 50% stenosis and mid-distal 99% stenosis. LCX Medium to large size vessel, proximal 30 to 40% stenosis. OM 3 appears to be the largest OM and is 100% proximal-mid , distal vessel is seen through left to left collaterals and appears to have less than 10% stenosis. RCA Dominant, tortuous, proximal-mid 60 to 70% stenosis. There are moderate right to left collaterals to the circumflex. BYPASS GRAFTS    LIMA-LAD Widely patent with less than 10% wpbdmani-bso-bcfaiz stenosis, the distal LAD has stenosis as noted above.        SVG-D1  100% proximal/mid-distal        SVG-circumflex  100% frnzidzi-ard-oyogvs          Findings and plans as noted below were discussed with the patient and family, they understand/agree      CONCLUSIONS:     2 out of 3 bypass grafts are occluded, the LIMA to LAD graft is patent  We will consult with CT surgery regarding options for revascularization which potentially may include redo CABG versus high risk PCI of the left main into diagonal  Will order CTA abd/bilat LE w/ runoff, in case cardiac support devices are needed

## 2021-04-14 NOTE — CARE COORDINATION
DISCHARGE ORDER  Date/Time 2021 8:40 AM  Completed by: Anisa Sue, Case Management    Patient Name: Avery Weiss      : 1960  Admitting Diagnosis: DENIA (acute kidney injury) ([facility] [address] [N17.9]      Admit order Date and Status:2021  (verify MD's last order for status of admission)      Noted discharge order. Discharge Plan: Reviewed chart. CM was not following pt. Pt is transferring to [facility] today for a cardiac cath.

## 2021-04-14 NOTE — CONSULTS
Electrophysiology Consultation   Date: 4/14/2021  Admit Date:  4/14/2021  Reason for Consultation: Bradycardia  Consult Requesting Physician: Jhoana Shelton MD     No chief complaint on file. HPI:   Mr. Yarelis Mdeellin is a pleasant 64year old male with a medical history significant for ischemic cardiomyopathy status post CABG (09/2020 [facility]), hypertension, hyperlipidemia, morbid obesity, obstructive sleep apnea, and COPD (former smoker, quit 8 months ago) who presented with unstable angina now with positive stress test and whose hospital course was reportedly complicated by intermittent 2:1 conduction and complete heart block. According to patient he initially presented to the medical system after he was diagnosed with influenza pneumonia for which he required ventilation support for approximately 6 days and a prolonged hospital course of approximate 11 days. He never quite recovered and continued to have issues with shortness of breath. Eventually this led him down the path of newly diagnosed ischemic cardiomyopathy. He subsequently underwent a CABG in September 2020 with Dr. Kendrick Olivo. Postoperatively he felt much better for approximately a month however he did have new onset dizziness that has been chronic and ongoing. He has had some issues bouncing off of walls intermittently because of his dizziness. Of note, he reports having worsening chest pain. He recently presented to [facility] with unstable angina. He underwent stress test which showed reversible ischemia. He was transferred to [facility] for left heart catheterization. While in the hospital, patient was on telemetry which showed rare PVCs and intermittent nocturnal complete heart block. He also had some 2:1 conduction and second degree heart block Mobitz I (once during day time).     Past Medical History:   Diagnosis Date    Anxiety 1/6/2020    Aortic valve calcification 09/2020    seen on lung CT    Chronic obstructive pulmonary disease ([facility] [address] 9/3/2019    PFT done 9/03/19    Coronary artery calcification 09/2020    seen on lung ct    Crush injury of right foot 7/23/2015    DDD (degenerative disc disease), lumbar 1/6/2020    Erectile dysfunction 1/6/2020    Fatigue 1/6/2020    History of alcohol abuse 1/6/2020    History of drug use     HNP (herniated nucleus pulposus), lumbar 1/6/2020    Hyperglycemia 1/6/2020    Hypersomnia 1/6/2020    Kidney stone     Lumbar radiculopathy 1/6/2020    Lumbar spine pain 1/6/2020    LVH (left ventricular hypertrophy)     seen on echo 8/2020, moderate    Observed sleep apnea 1/6/2020    Reactive depression 1/6/2020    Spondylosis without myelopathy or radiculopathy, lumbar region 1/6/2020    Tobacco use 1/6/2020        Past Surgical History:   Procedure Laterality Date    CHOLECYSTECTOMY      CORONARY ARTERY BYPASS GRAFT N/A 9/25/2020    CORONARY ARTERY BYPASS GRAFTING X3, INTERNAL MAMMARY ARTERY, SAPHENOUS VEIN GRAFT, ON PUMP, STERNAL PLATING, 5 LEVEL BILATERAL INTERCOSTAL NERVE BLOCK, PLATELET GEL APPLICATION performed by Samira Haynes MD at [address] [facility]  03/16/2011    cystoscopy left ureteroscopy, left retrograde, double J stent placement    FINGER AMPUTATION Right 2/2/2021    RIGHT MIDDLE FINGER IRRIGATION AND DEBRIDEMENT WITH REVISION AMPUTATION AND BONE SHORTENING, POSSIBLE NAIL ABLATION performed by Emil Mustafa MD at [address] [facility] Right 12/24/2019    RIGHT LUMBAR FIVE SACRAL ONE TRANSFORAMINAL EPIDURAL STEROID INJECTION SITE CONFIRMED BY FLUOROSCOPY performed by Lb Cortés MD at [address] [facility] Right 1/7/2020    RIGHT LUMBAR FOUR AND LUMBAR FIVE TRANSFORAMINAL EPIDURAL STEROID INJECTION SITE CONFIRMED BY FLUOROSCOPY performed by Lb Cortés MD at [place] [address]       Allergies   Allergen Reactions    Dilaudid [Hydromorphone Hcl] Nausea And Vomiting    Codeine Itching       Social History:  Reviewed. reports that he quit smoking about 13 months ago. His smoking use included cigarettes. He has a 70.00 pack-year smoking history. He has never used smokeless tobacco. He reports previous drug use. He reports that he does not drink alcohol. Family History:  Reviewed. family history includes Heart Disease in his mother; High Blood Pressure in his sister; Liver Disease in his father; No Known Problems in his sister; Other in his mother. No premature CAD. Review of System:  All other systems reviewed except for that noted above. Pertinent negatives and positives are:     · General: negative for fever, chills   · Ophthalmic ROS: negative for - eye pain or loss of vision  · ENT ROS: negative for - headaches, sore throat   · Respiratory: negative for - cough, sputum  · Cardiovascular: Reviewed in HPI  · Gastrointestinal: negative for - abdominal pain, diarrhea, N/V  · Hematology: negative for - bleeding, blood clots, bruising or jaundice  · Genito-Urinary:  negative for - Dysuria or incontinence  · Musculoskeletal: negative for - Joint swelling, muscle pain  · Neurological: negative for - confusion, dizziness, headaches   · Psychiatric: No anxiety, no depression. · Dermatological: negative for - rash    Physical Examination:  There were no vitals filed for this visit. No intake or output data in the 24 hours ending 21 1029  No intake/output data recorded. Wt Readings from Last 3 Encounters:   21 283 lb (128.4 kg)   21 283 lb 1.6 oz (128.4 kg)   21 297 lb 9.6 oz (135 kg)     Temp  Av.6 °F (36.4 °C)  Min: 97.2 °F (36.2 °C)  Max: 98 °F (36.7 °C)  Pulse  Av.5  Min: 81  Max: 96  BP  Min: 116/65  Max: 157/80  SpO2  Av.3 %  Min: 94 %  Max: 97 %    · Telemetry: Sinus rhythm with nocturnal complete heart block  · Constitutional: Alert. Oriented to person, place, and time. No distress. · Head: Normocephalic and atraumatic.    · Mouth/Throat: Lips appear moist. Oropharynx is clear and moist.  · Neck: Neck supple. No lymphadenopathy. No rigidity. No JVD present. · Cardiovascular: Normal rate, regular rhythm. Normal S1&S2. · Pulmonary/Chest: Bilateral respiratory sounds present. No respiratory accessory muscle use. · Abdominal: Soft. Normal bowel sounds present. No distension, No tenderness. · Musculoskeletal: No tenderness. No edema    · Neurological: Alert and oriented. Cranial nerve II-XII grossly intact. · Skin: Skin is warm and dry. No rash, lesions, ulcerations noted. · Psychiatric: No anxiety nor agitation    Labs:  Reviewed. Recent Labs     04/13/21  1153      K 4.5      CO2 30   BUN 10   CREATININE 0.9     Recent Labs     04/11/21  2241 04/12/21  0257 04/13/21  1152   WBC 10.6  --  10.5   HGB 12.7* 12.8* 13.3*   HCT 37.8* 38.5* 40.4*   MCV 88.4  --  89.8     --  170     Lab Results   Component Value Date    TROPONINI <0.01 04/09/2021     No results found for: BNP  Lab Results   Component Value Date    PROTIME 12.7 10/19/2020    PROTIME 15.7 09/26/2020    PROTIME 13.5 09/25/2020    INR 1.10 10/19/2020    INR 1.35 09/26/2020    INR 1.16 09/25/2020     Lab Results   Component Value Date    CHOL 85 09/25/2020    HDL 35 01/13/2021    TRIG 173 09/25/2020       Diagnostic and imaging results reviewed. ECG: Normal sinus rhythm without signs of ongoing ischemia. Echo: 03/12/2021   Summary   Limited echo for left ventricle systolic function. Definity is used for   myocardial border enhancement. LV systolic function is mildly reduced with a visually estimated EF=45-50%. The left ventricle is normal in size with normal wall thickness. More prominent Inferior HK on certain views. Indeterminate diastolic function. Exercise stress test with myoview (04/13/2021):   Summary    Large area, reversible, moderate to severe intensity perfusion defect    involving the basal to apical anterolateral, mid to apical anterior and    apex. Referral for CABG    I independently reviewed the ECG and telemetry. Scheduled Meds:  Continuous Infusions:  PRN Meds:. Assessment:   Patient Active Problem List    Diagnosis Date Noted    Mobitz type I Wenckebach atrioventricular block     Asystole ([facility] [address]     Ischemic cardiomyopathy     Diverticulitis of colon     Acute diverticulitis     Chronic systolic congestive heart failure (HCC)     Acute kidney injury ([facility] [address] 04/08/2021    S/P three vessel coronary artery bypass 10/26/2020    Abnormal cardiovascular stress test 09/22/2020    Coronary artery disease involving native heart with angina pectoris ([facility] [address] 09/22/2020    Hyperlipidemia     Insomnia     Morbid obesity with BMI of 45.0-49.9, adult ([facility] [address]     Acute respiratory failure ([facility] [address] 08/19/2020    Acute on chronic respiratory failure with hypoxemia (HCC)     Acute diastolic congestive heart failure (HCC)     Moderate protein-calorie malnutrition ([facility] [address] 03/17/2020    Pneumonia, primary atypical     Acute respiratory failure with hypoxia (HCC)     Acute respiratory distress     Class 3 severe obesity with body mass index (BMI) of 45.0 to 49.9 in adult [facility])     Erectile dysfunction 01/06/2020    Hyperglycemia 01/06/2020    Anxiety 01/06/2020    Depression 01/06/2020    Tobacco abuse 01/06/2020    History of alcohol abuse 01/06/2020    Fatigue 01/06/2020    OANH (obstructive sleep apnea) 01/06/2020    Hypersomnia 01/06/2020    Lumbar radiculopathy 01/06/2020    HNP (herniated nucleus pulposus), lumbar 01/06/2020    Spondylosis without myelopathy or radiculopathy, lumbar region 01/06/2020    DDD (degenerative disc disease), lumbar 01/06/2020    Lumbar spine pain 01/06/2020    Chronic obstructive pulmonary disease ([facility] [address] 09/03/2019    Chest pain 08/08/2019    Shortness of breath 08/07/2019    Crush injury of right foot 07/23/2015      There are no active hospital problems to display for this patient.     Mr. Hayley Mobley is a pleasant 64year old male with a medical history significant for ischemic cardiomyopathy status post CABG (09/2020 [facility]), hypertension, hyperlipidemia, morbid obesity, obstructive sleep apnea, and COPD (former smoker, quit 8 months ago) who presented with unstable angina now with positive stress test and whose hospital course was reportedly complicated by intermittent 2:1 conduction and complete heart block. Problem List:  1. Bradycardia. 2. Coronary artery disease status post CABG - Unstable angina with reversible ischemia on stress test.  3. Hypertension. 4. COPD. Assessment and Plan:  1. Bradycardia. Patient is a pleasant 43-year-old male with a medical history for ischemic cardiomyopathy status post CABG in September 2020 who presents from home with unstable angina. His exercise stress test showed that he had some chronotropic incompetence however he was able to increase his heart rate without heart block which is helpful from a conduction standpoint given his history of 2 1 conduction, Mobitz type I block, and complete heart block. Most of his complete heart block and been pronounced bradycardic events were nocturnal and occurred around 4 5 AM in the morning. Patient does have a history of obstructive sleep apnea as I suspect this is related to his obstructive sleep apnea. I do not see any current pacing indications. His left ventricular function is greater than 35% and he scheduled to undergo a left heart catheterization because of suspected ongoing ischemia. Again I see no indication for ICD. Because of his now known bradycardic events and history of dizziness and ischemic cardiomyopathy I think it be reasonable patient wear a cardiac monitor for 4 weeks post discharge. - 4 week monitor at time of discharge. - Transition to metoprolol succinate and GDMT per cardiology. - We will sign off case. - Follow up with me as outpatient to review dizziness and monitor results.     2. Coronary artery disease status post CABG - Unstable angina with reversible ischemia on stress test.  Patient presented with UA. - Per cardiology. 3. Hypertension.  - Per cardiology. 4. COPD. - Per primary team.    Thank you for allowing me to participate in the care of [facility] . If you have any questions/comments, please do not hesitate to contact us.     Rema De Jesus MD  Cardiac Electrophysiology  [address] [facility]  [phone] [facility]

## 2021-04-15 ENCOUNTER — APPOINTMENT (OUTPATIENT)
Dept: CT IMAGING | Age: 61
DRG: 215 | End: 2021-04-15
Attending: INTERNAL MEDICINE
Payer: COMMERCIAL

## 2021-04-15 LAB
ANION GAP SERPL CALCULATED.3IONS-SCNC: 7 MMOL/L (ref 3–16)
APTT: 30.7 SEC (ref 24.2–36.2)
APTT: 33.2 SEC (ref 24.2–36.2)
BUN BLDV-MCNC: 13 MG/DL (ref 7–20)
CALCIUM SERPL-MCNC: 9.2 MG/DL (ref 8.3–10.6)
CHLORIDE BLD-SCNC: 102 MMOL/L (ref 99–110)
CO2: 30 MMOL/L (ref 21–32)
CREAT SERPL-MCNC: 0.9 MG/DL (ref 0.9–1.3)
EKG ATRIAL RATE: 72 BPM
EKG DIAGNOSIS: NORMAL
EKG P AXIS: 70 DEGREES
EKG P-R INTERVAL: 192 MS
EKG Q-T INTERVAL: 362 MS
EKG QRS DURATION: 78 MS
EKG QTC CALCULATION (BAZETT): 396 MS
EKG R AXIS: 17 DEGREES
EKG T AXIS: 79 DEGREES
EKG VENTRICULAR RATE: 72 BPM
GFR AFRICAN AMERICAN: >60
GFR NON-AFRICAN AMERICAN: >60
GLUCOSE BLD-MCNC: 109 MG/DL (ref 70–99)
HCT VFR BLD CALC: 38.1 % (ref 40.5–52.5)
HCT VFR BLD CALC: 38.9 % (ref 40.5–52.5)
HEMOGLOBIN: 12.2 G/DL (ref 13.5–17.5)
HEMOGLOBIN: 12.8 G/DL (ref 13.5–17.5)
MCH RBC QN AUTO: 28.4 PG (ref 26–34)
MCH RBC QN AUTO: 29 PG (ref 26–34)
MCHC RBC AUTO-ENTMCNC: 32.1 G/DL (ref 31–36)
MCHC RBC AUTO-ENTMCNC: 33 G/DL (ref 31–36)
MCV RBC AUTO: 87.8 FL (ref 80–100)
MCV RBC AUTO: 88.5 FL (ref 80–100)
PDW BLD-RTO: 18 % (ref 12.4–15.4)
PDW BLD-RTO: 18 % (ref 12.4–15.4)
PLATELET # BLD: 173 K/UL (ref 135–450)
PLATELET # BLD: 187 K/UL (ref 135–450)
PMV BLD AUTO: 7.9 FL (ref 5–10.5)
PMV BLD AUTO: 8.4 FL (ref 5–10.5)
POTASSIUM SERPL-SCNC: 4.4 MMOL/L (ref 3.5–5.1)
RBC # BLD: 4.3 M/UL (ref 4.2–5.9)
RBC # BLD: 4.43 M/UL (ref 4.2–5.9)
SODIUM BLD-SCNC: 139 MMOL/L (ref 136–145)
TROPONIN: <0.01 NG/ML
WBC # BLD: 11.2 K/UL (ref 4–11)
WBC # BLD: 11.6 K/UL (ref 4–11)

## 2021-04-15 PROCEDURE — 6370000000 HC RX 637 (ALT 250 FOR IP): Performed by: INTERNAL MEDICINE

## 2021-04-15 PROCEDURE — 99233 SBSQ HOSP IP/OBS HIGH 50: CPT | Performed by: INTERNAL MEDICINE

## 2021-04-15 PROCEDURE — 75635 CT ANGIO ABDOMINAL ARTERIES: CPT

## 2021-04-15 PROCEDURE — 76937 US GUIDE VASCULAR ACCESS: CPT

## 2021-04-15 PROCEDURE — 80048 BASIC METABOLIC PNL TOTAL CA: CPT

## 2021-04-15 PROCEDURE — 2500000003 HC RX 250 WO HCPCS: Performed by: INTERNAL MEDICINE

## 2021-04-15 PROCEDURE — 2580000003 HC RX 258: Performed by: INTERNAL MEDICINE

## 2021-04-15 PROCEDURE — 6360000002 HC RX W HCPCS: Performed by: INTERNAL MEDICINE

## 2021-04-15 PROCEDURE — 2580000003 HC RX 258: Performed by: NURSE PRACTITIONER

## 2021-04-15 PROCEDURE — 85027 COMPLETE CBC AUTOMATED: CPT

## 2021-04-15 PROCEDURE — 6360000004 HC RX CONTRAST MEDICATION: Performed by: INTERNAL MEDICINE

## 2021-04-15 PROCEDURE — 93010 ELECTROCARDIOGRAM REPORT: CPT | Performed by: INTERNAL MEDICINE

## 2021-04-15 PROCEDURE — 93005 ELECTROCARDIOGRAM TRACING: CPT | Performed by: INTERNAL MEDICINE

## 2021-04-15 PROCEDURE — 85730 THROMBOPLASTIN TIME PARTIAL: CPT

## 2021-04-15 PROCEDURE — 36415 COLL VENOUS BLD VENIPUNCTURE: CPT

## 2021-04-15 PROCEDURE — C1751 CATH, INF, PER/CENT/MIDLINE: HCPCS

## 2021-04-15 PROCEDURE — 2060000000 HC ICU INTERMEDIATE R&B

## 2021-04-15 PROCEDURE — 2700000000 HC OXYGEN THERAPY PER DAY

## 2021-04-15 PROCEDURE — 99255 IP/OBS CONSLTJ NEW/EST HI 80: CPT | Performed by: NURSE PRACTITIONER

## 2021-04-15 PROCEDURE — 2500000003 HC RX 250 WO HCPCS: Performed by: NURSE PRACTITIONER

## 2021-04-15 PROCEDURE — 94761 N-INVAS EAR/PLS OXIMETRY MLT: CPT

## 2021-04-15 PROCEDURE — 84484 ASSAY OF TROPONIN QUANT: CPT

## 2021-04-15 RX ORDER — HEPARIN SODIUM 1000 [USP'U]/ML
4000 INJECTION, SOLUTION INTRAVENOUS; SUBCUTANEOUS ONCE
Status: COMPLETED | OUTPATIENT
Start: 2021-04-15 | End: 2021-04-15

## 2021-04-15 RX ORDER — HEPARIN SODIUM 1000 [USP'U]/ML
4000 INJECTION, SOLUTION INTRAVENOUS; SUBCUTANEOUS PRN
Status: DISCONTINUED | OUTPATIENT
Start: 2021-04-15 | End: 2021-04-19 | Stop reason: ALTCHOICE

## 2021-04-15 RX ORDER — SODIUM CHLORIDE 0.9 % (FLUSH) 0.9 %
5-40 SYRINGE (ML) INJECTION PRN
Status: DISCONTINUED | OUTPATIENT
Start: 2021-04-15 | End: 2021-04-20 | Stop reason: HOSPADM

## 2021-04-15 RX ORDER — NITROGLYCERIN 0.4 MG/1
0.4 TABLET SUBLINGUAL EVERY 5 MIN PRN
Status: DISCONTINUED | OUTPATIENT
Start: 2021-04-15 | End: 2021-04-20 | Stop reason: HOSPADM

## 2021-04-15 RX ORDER — NITROGLYCERIN 20 MG/100ML
5-200 INJECTION INTRAVENOUS CONTINUOUS
Status: DISCONTINUED | OUTPATIENT
Start: 2021-04-15 | End: 2021-04-19

## 2021-04-15 RX ORDER — HEPARIN SODIUM 10000 [USP'U]/100ML
13.5 INJECTION, SOLUTION INTRAVENOUS CONTINUOUS
Status: DISCONTINUED | OUTPATIENT
Start: 2021-04-15 | End: 2021-04-16

## 2021-04-15 RX ORDER — MECOBALAMIN 5000 MCG
5 TABLET,DISINTEGRATING ORAL NIGHTLY
Status: DISCONTINUED | OUTPATIENT
Start: 2021-04-15 | End: 2021-04-20 | Stop reason: HOSPADM

## 2021-04-15 RX ORDER — LIDOCAINE HYDROCHLORIDE 10 MG/ML
5 INJECTION, SOLUTION INFILTRATION; PERINEURAL ONCE
Status: COMPLETED | OUTPATIENT
Start: 2021-04-15 | End: 2021-04-15

## 2021-04-15 RX ORDER — MORPHINE SULFATE 2 MG/ML
2 INJECTION, SOLUTION INTRAMUSCULAR; INTRAVENOUS ONCE
Status: COMPLETED | OUTPATIENT
Start: 2021-04-15 | End: 2021-04-15

## 2021-04-15 RX ORDER — SODIUM CHLORIDE 9 MG/ML
25 INJECTION, SOLUTION INTRAVENOUS PRN
Status: DISCONTINUED | OUTPATIENT
Start: 2021-04-15 | End: 2021-04-20 | Stop reason: HOSPADM

## 2021-04-15 RX ORDER — SODIUM CHLORIDE 0.9 % (FLUSH) 0.9 %
5-40 SYRINGE (ML) INJECTION EVERY 12 HOURS SCHEDULED
Status: DISCONTINUED | OUTPATIENT
Start: 2021-04-15 | End: 2021-04-20

## 2021-04-15 RX ORDER — ALBUTEROL SULFATE 2.5 MG/3ML
2.5 SOLUTION RESPIRATORY (INHALATION) EVERY 6 HOURS PRN
Status: DISCONTINUED | OUTPATIENT
Start: 2021-04-15 | End: 2021-04-15

## 2021-04-15 RX ORDER — HEPARIN SODIUM 1000 [USP'U]/ML
2000 INJECTION, SOLUTION INTRAVENOUS; SUBCUTANEOUS PRN
Status: DISCONTINUED | OUTPATIENT
Start: 2021-04-15 | End: 2021-04-19 | Stop reason: ALTCHOICE

## 2021-04-15 RX ADMIN — HEPARIN SODIUM 4000 UNITS: 1000 INJECTION INTRAVENOUS; SUBCUTANEOUS at 22:34

## 2021-04-15 RX ADMIN — HEPARIN SODIUM 10 ML/HR: 10000 INJECTION, SOLUTION INTRAVENOUS at 14:55

## 2021-04-15 RX ADMIN — ATORVASTATIN CALCIUM 80 MG: 80 TABLET, FILM COATED ORAL at 21:16

## 2021-04-15 RX ADMIN — Medication 10 ML: at 22:37

## 2021-04-15 RX ADMIN — ASPIRIN 81 MG: 81 TABLET, COATED ORAL at 09:34

## 2021-04-15 RX ADMIN — ACETAMINOPHEN 650 MG: 325 TABLET ORAL at 08:20

## 2021-04-15 RX ADMIN — HEPARIN SODIUM 4000 UNITS: 1000 INJECTION INTRAVENOUS; SUBCUTANEOUS at 14:49

## 2021-04-15 RX ADMIN — MORPHINE SULFATE 2 MG: 2 INJECTION, SOLUTION INTRAMUSCULAR; INTRAVENOUS at 02:33

## 2021-04-15 RX ADMIN — Medication 5 MG: at 02:33

## 2021-04-15 RX ADMIN — NITROGLYCERIN 5 MCG/MIN: 20 INJECTION INTRAVENOUS at 14:50

## 2021-04-15 RX ADMIN — METOPROLOL TARTRATE 25 MG: 25 TABLET, FILM COATED ORAL at 21:16

## 2021-04-15 RX ADMIN — TRAZODONE HYDROCHLORIDE 100 MG: 50 TABLET ORAL at 21:16

## 2021-04-15 RX ADMIN — SODIUM CHLORIDE, PRESERVATIVE FREE 10 ML: 5 INJECTION INTRAVENOUS at 22:48

## 2021-04-15 RX ADMIN — NITROGLYCERIN 0.4 MG: 0.4 TABLET, ORALLY DISINTEGRATING SUBLINGUAL at 10:31

## 2021-04-15 RX ADMIN — MORPHINE SULFATE 2 MG: 4 INJECTION, SOLUTION INTRAMUSCULAR; INTRAVENOUS at 21:16

## 2021-04-15 RX ADMIN — METRONIDAZOLE 500 MG: 500 INJECTION, SOLUTION INTRAVENOUS at 00:10

## 2021-04-15 RX ADMIN — IOPAMIDOL 100 ML: 755 INJECTION, SOLUTION INTRAVENOUS at 12:08

## 2021-04-15 RX ADMIN — Medication 10 ML: at 02:33

## 2021-04-15 RX ADMIN — METRONIDAZOLE 500 MG: 500 INJECTION, SOLUTION INTRAVENOUS at 09:35

## 2021-04-15 RX ADMIN — MORPHINE SULFATE 2 MG: 4 INJECTION, SOLUTION INTRAMUSCULAR; INTRAVENOUS at 10:30

## 2021-04-15 RX ADMIN — Medication 10 ML: at 10:32

## 2021-04-15 RX ADMIN — Medication 10 ML: at 03:04

## 2021-04-15 RX ADMIN — SERTRALINE 100 MG: 50 TABLET, FILM COATED ORAL at 09:34

## 2021-04-15 RX ADMIN — METOPROLOL TARTRATE 25 MG: 25 TABLET, FILM COATED ORAL at 09:34

## 2021-04-15 RX ADMIN — MORPHINE SULFATE 2 MG: 4 INJECTION, SOLUTION INTRAMUSCULAR; INTRAVENOUS at 14:13

## 2021-04-15 RX ADMIN — Medication 10 ML: at 00:09

## 2021-04-15 RX ADMIN — Medication 10 ML: at 10:31

## 2021-04-15 RX ADMIN — MORPHINE SULFATE 2 MG: 4 INJECTION, SOLUTION INTRAMUSCULAR; INTRAVENOUS at 08:20

## 2021-04-15 RX ADMIN — MORPHINE SULFATE 2 MG: 4 INJECTION, SOLUTION INTRAMUSCULAR; INTRAVENOUS at 17:32

## 2021-04-15 RX ADMIN — CLOPIDOGREL BISULFATE 75 MG: 75 TABLET ORAL at 09:34

## 2021-04-15 RX ADMIN — NITROGLYCERIN 0.4 MG: 0.4 TABLET, ORALLY DISINTEGRATING SUBLINGUAL at 10:25

## 2021-04-15 RX ADMIN — LIDOCAINE HYDROCHLORIDE 5 ML: 10 INJECTION, SOLUTION INFILTRATION; PERINEURAL at 23:18

## 2021-04-15 RX ADMIN — MORPHINE SULFATE 2 MG: 4 INJECTION, SOLUTION INTRAMUSCULAR; INTRAVENOUS at 03:04

## 2021-04-15 RX ADMIN — MORPHINE SULFATE 2 MG: 4 INJECTION, SOLUTION INTRAMUSCULAR; INTRAVENOUS at 00:09

## 2021-04-15 ASSESSMENT — PAIN SCALES - GENERAL
PAINLEVEL_OUTOF10: 6
PAINLEVEL_OUTOF10: 5
PAINLEVEL_OUTOF10: 6
PAINLEVEL_OUTOF10: 5

## 2021-04-15 NOTE — PROGRESS NOTES
Iv antibiotics on hold, having trouble getting IV access. Will continue to monitor.  Electronically signed by Elsa Rodriguez RN on 4/15/2021 at 6:31 PM

## 2021-04-15 NOTE — CARE COORDINATION
CASE MANAGEMENT INITIAL ASSESSMENT      Reviewed chart and completed assessment with: patient and sister   Explained Case Management role/services. Health Care Decision Maker :   Primary Decision Maker: Yulisa Arzate - Spouse - [phone]    Secondary Decision Maker: Jules Cousins - Brother/Sister - [phone]          Can this person be reached and be able to respond quickly, such as within a few minutes or hours? Yes    Admit date/status: 4/14/21 Inpatient   Diagnosis: CAD    Is this a Readmission?:  No      Insurance:  Patient says he has insurance but doesn't know which one. States it's on file but unable to find. Precert required for SNF: Yes       3 night stay required: No    Living arrangements, Adls, care needs, prior to admission: lives in a house with his wife    [address] [place] [address] at home:  Walker__Cane__RTS__ BSC__Shower Chair__  02_X (unsure of company)_ HHN_X_ CPAP__  BiPap__  Hospital Bed__ W/C___ Other__________    Services in the home and/or outpatient, prior to admission: none    Dialysis Facility (if applicable)   · Name:  · Address:  · Dialysis Schedule:  · Phone:  · Fax:    PT/OT recs: none    Hospital Exemption Notification (HEN): not initiated     Barriers to discharge: none    Plan/comments: Patient is independent at home and has his own company. Plan is for PCI on Monday. CM will continue to follow for needs.

## 2021-04-15 NOTE — PLAN OF CARE
HOSPITAL MEDICINE  - BRIEF NOTE    Seen by my colleague overnight. History/record reviewed, patient seen and examined. Patient C/o active Chest Pain when this provider walked into his room . Describes pain in substernal and radiating to his back and making him very uncomfortable. Patient had Right Radial access C this AM which showed 2/3 Bypass grafts Occluded for which CT surgical eval recommended by Cardiology. As per Daughter at bedside ,  she reported that CT surgery PA was in Earlier and was recommended that he would not be a candidate for CABG . I obtained An EKG and while waiting for EKG to be done given him SL Nitro , which patient reports that eased the pain . Will Repeat SL nitro in 5 mins if Pain continues . D/w RN . I also called and spoke to Dr. Tanya Schaeffer (Interventional Cardiology ) regarding Care plan. He recommended that he would start patient on IV heparin gtt and Nitro gtt for now and tentatively schedule him for High risk PCI on Monday 4/19/21 . Relayed that info to RN .  Will follow     Beata Landaverde MD  Hospitalist Physician

## 2021-04-15 NOTE — PROGRESS NOTES
CARDIOLOGY PROGRESS NOTE      Patient Name: Argenis Amanda  Date of admission: 4/14/2021  9:58 AM  Admission Dx: CAD in native artery [I25.10]  Reason for Consult:  Abnormal stress test   Requesting Physician: Jennifer Strong MD  Primary Care physician: ANICETO Mcgill - CNP    Subjective:     Argenis Amanda is a pleasant 64 y.o. man with prior history notable for congestive heart failure with ischemic cardiomyopathy, coronary artery disease status post coronary artery bypass (LIMA to LAD, SVG to OM and SVG to diagonal) in context of unstable angina, previous smoker, and obstructive sleep apnea noncompliant with CPAP.     Conor Rodriguez has had difficult course over the past few months with worsening dyspnea, fatigue and difficult to control BP with wild swings and multiple medication adjustments over time. We has been concerned that high BP/low BP/volume overload have contributed to his symptoms at one time or another. Noted echo repeated mid March with decline in LV function. Stress test was being arranged on OP basis to follow this up when patient presented to hospital for worsening symptoms. Notes he was having angina radiating to arm prior to presentation as well.+fatigue/SOB. He was having need to stop on way to/from mailbox on OP basis due to these symptoms. Continues to have this when ambulating in room. Patient was transferred for cardiac catheterization to [facility] yesterday. Catheterization yesterday with Dr. Zhang Jackson showed patent LIMA to LAD with occluded vein grafts. Severe mid to distal left main disease, severe large first diagonal disease, occluded marginal with left to left collaterals. CT surgery is reevaluated and agree PCI approach next best step. I discussed with interventional/Dr. Zhang Jackson and we are planning for intervention of left main and the diagonal.  CT abdomen with lower extremity runoffs planned today for preprocedural planning.   Possible Impella case.    Kamille Hill had recurrent chest pain overnight and this morning. EKG showed no ischemic changes. Did respond to several nitroglycerin as well as morphine. Placed on heparin and nitroglycerin drips per interventional.  Feeling much better on my assessment this afternoon. No chest pain at present. He is anxious but ready to proceed Monday. Options discussed with he and his family with myself as well as CT surgery today, he is willing to proceed. Home Medications:  Were reviewed and are listed in nursing record and/or below  Prior to Admission medications    Medication Sig Start Date End Date Taking?  Authorizing Provider   amoxicillin-clavulanate (AUGMENTIN) 875-125 MG per tablet Take 1 tablet by mouth 2 times daily for 5 days 4/14/21 4/19/21  Luke Mccain PA-C   lisinopril (PRINIVIL;ZESTRIL) 20 MG tablet Take 0.5 tablets by mouth daily 3/15/21   Brian Squires MD   furosemide (LASIX) 80 MG tablet Take 1 tablet by mouth 2 times daily 3/3/21 3/3/22  Brian Squires MD   ipratropium (ATROVENT) 0.02 % nebulizer solution Take 2.5 mLs by nebulization 3 times daily 3/2/21   ANICETO Malik CNP   Handicap Placard MISC by Does not apply route Duration - 5 years 3/1/21   ANICETO Malik CNP   traZODone (DESYREL) 100 MG tablet Take 1 tablet by mouth nightly  Patient taking differently: Take 100 mg by mouth 2 times daily Am pm 3/1/21   ANICETO Malik CNP   sertraline (ZOLOFT) 100 MG tablet Take 1 tablet by mouth daily 3/1/21   ANICETO Malik CNP   acetaminophen (TYLENOL) 500 MG tablet Take 2 tablets by mouth 3 times daily for 10 days 1/22/21 4/8/21  Lisseth Pruitt MD   clopidogrel (PLAVIX) 75 MG tablet Take 1 tablet by mouth daily 1/13/21   Brian Squires MD   metoprolol tartrate (LOPRESSOR) 25 MG tablet Take 1 tablet by mouth 2 times daily Hold this medication for pulse <91 or systolic BP <067 68/92/79   ANICETO Malik CNP   ibuprofen (ADVIL;MOTRIN) 800 MG tablet Take 1 tablet by mouth 3 times daily 10/30/20   Teressa LewisANICETO CNP   atorvastatin (LIPITOR) 80 MG tablet Take 1 tablet by mouth nightly 10/28/20   Ruthy Arzate MD   aspirin 81 MG EC tablet Take 1 tablet by mouth daily 9/30/20   Pham Crowley, APRN - CNP   albuterol (PROVENTIL) (2.5 MG/3ML) 0.083% nebulizer solution Take 3 mLs by nebulization every 6 hours as needed for Wheezing or Shortness of Breath 9/25/19   Teressa Lewis, APRN - CNP   albuterol sulfate HFA (PROVENTIL HFA) 108 (90 Base) MCG/ACT inhaler Inhale 2 puffs into the lungs every 6 hours as needed for Wheezing 5/17/19   Delmer Trejo MD        CURRENT Medications:  melatonin disintegrating tablet 5 mg, Nightly  nitroGLYCERIN (NITROSTAT) SL tablet 0.4 mg, Q5 Min PRN  heparin (porcine) injection 4,000 Units, Once  heparin (porcine) injection 4,000 Units, PRN  heparin (porcine) injection 2,000 Units, PRN  heparin 25,000 units in dextrose 5% 250 mL (premix) infusion, Continuous  nitroGLYCERIN 50 mg in dextrose 5% 250 mL infusion, Continuous  0.9 % sodium chloride infusion, PRN  sodium chloride flush 0.9 % injection 5-40 mL, 2 times per day  sodium chloride flush 0.9 % injection 5-40 mL, PRN  0.9 % sodium chloride infusion, PRN  acetaminophen (TYLENOL) tablet 650 mg, Q6H PRN    Or  acetaminophen (TYLENOL) suppository 650 mg, Q6H PRN  polyethylene glycol (GLYCOLAX) packet 17 g, Daily PRN  promethazine (PHENERGAN) tablet 12.5 mg, Q6H PRN    Or  ondansetron (ZOFRAN) injection 4 mg, Q6H PRN  sodium chloride flush 0.9 % injection 5-40 mL, 2 times per day  sodium chloride flush 0.9 % injection 5-40 mL, PRN  albuterol (PROVENTIL) nebulizer solution 2.5 mg, Q6H PRN  aspirin EC tablet 81 mg, Daily  atorvastatin (LIPITOR) tablet 80 mg, Nightly  cefTRIAXone (ROCEPHIN) 1000 mg IVPB in 50 mL D5W minibag, Q24H  clopidogrel (PLAVIX) tablet 75 mg, Daily  ipratropium (ATROVENT) 0.02 % nebulizer solution 0.5 mg, TID PRN  metoprolol tartrate (LOPRESSOR) tablet 25 mg, BID  metronidazole (FLAGYL) 500 mg in NaCl 100 mL IVPB premix, Q8H  morphine (PF) injection 2 mg, Q3H PRN  sertraline (ZOLOFT) tablet 100 mg, Daily  traZODone (DESYREL) tablet 100 mg, Nightly  sodium chloride flush 0.9 % injection 5-40 mL, 2 times per day  sodium chloride flush 0.9 % injection 5-40 mL, PRN  0.9 % sodium chloride infusion, PRN  acetaminophen (TYLENOL) tablet 650 mg, Q4H PRN        Allergies:  Dilaudid [hydromorphone hcl] and Codeine     Review of Systems:   A 14 point review of symptoms completed. Pertinent positives identified in the HPI, all other review of symptoms negative. Objective:     Vitals:    04/15/21 1030 04/15/21 1042 04/15/21 1045 04/15/21 1103   BP: 106/66 103/67 103/67 99/66   Pulse: 75 66 66 69   Resp:  20     Temp:       TempSrc:       SpO2: 95% 94% (!) 88% 95%   Weight:       Height:          Weight: 283 lb (128.4 kg)       PHYSICAL EXAM:      General:   Anxious appearing, no acute distress   Head:  Normocephalic, atraumatic   Eyes:  Conjunctiva/corneas clear, anicteric sclerae    Nose: Nares normal, no drainage or sinus tenderness   Throat: No abnormalities of the lips, oral mucosa or tongue. Neck: Trachea midline. Neck supple with no lymphadenopathy, thyroid not enlarged, symmetric, no tenderness/mass/nodules, flat neck vv   Lungs:   Clear to auscultation bilaterally, no wheezes, no rales, no respiratory distress   Chest Wall:   Well healed sternotomy scar. Heart:  Regular rate and rhythm, normal S1, normal S2, no murmur, no rub, no S3/S4, PMI non-palpable. Abdomen:   Obese, Soft, non-tender, with normoactive bowel sounds. No masses, no hepatosplenomegaly   Extremities: No cyanosis, clubbing or pitting edema. Vascular: 2+ radial, 2+ femoral, dorsalis pedis and posterior tibial pulses bilaterally. Brisk carotid upstrokes without carotid bruit. Skin: Skin color, texture, turgor are normal with no rashes or ulceration.     Pysch: Euthymic mood, appropriate affect   Neurologic: Oriented to person, place and time. No slurred speech or facial asymmetry. No motor or sensory deficits on gross examination. Labs:   CBC:   Lab Results   Component Value Date    WBC 11.2 04/15/2021    RBC 4.30 04/15/2021    HGB 12.2 04/15/2021    HCT 38.1 04/15/2021    MCV 88.5 04/15/2021    RDW 18.0 04/15/2021     04/15/2021     CMP:  Lab Results   Component Value Date     04/15/2021    K 4.4 04/15/2021    K 4.9 04/10/2021     04/15/2021    CO2 30 04/15/2021    BUN 13 04/15/2021    CREATININE 0.9 04/15/2021    GFRAA >60 04/15/2021    GFRAA >60 03/16/2011    AGRATIO 1.3 04/08/2021    LABGLOM >60 04/15/2021    GLUCOSE 109 04/15/2021    PROT 6.3 04/09/2021    PROT 6.9 03/16/2011    CALCIUM 9.2 04/15/2021    BILITOT 0.5 04/09/2021    ALKPHOS 92 04/09/2021    AST 13 04/09/2021    ALT 16 04/09/2021     PT/INR:  No results found for: PTINR  HgBA1c:  Lab Results   Component Value Date    LABA1C 6.0 09/25/2020     Lab Results   Component Value Date    TROPONINI <0.01 04/15/2021         Interval Testing/Data:     [facility] 4/14/21  LVGRAM     LVEDP  6   GRADIENT ACROSS AORTIC VALVE  none   LV FUNCTION EF 60%   WALL MOTION  normal   MITRAL REGURGITATION  mild         CORONARY ARTERIES     LM Proximal less than 10% stenosis, mid-distal haziness with calcification and 70% eccentric stenosis.       LAD  Proximal-mid diffuse tubular 80% stenosis, there is mid-distal bidirectional flow noted. Distal vessel is visualized on LIMA angiogram, this shows 60% distal stenosis.     D1 is a large vessel with tortuosity noted and ostial/proximal 70% stenosis followed by mid vessel calcification with 50% stenosis and mid-distal 99% stenosis.       LCX Medium to large size vessel, proximal 30 to 40% stenosis.     OM 3 appears to be the largest OM and is 100% proximal-mid , distal vessel is seen through left to left collaterals and appears to have less than 10% stenosis.          RCA Dominant, tortuous, proximal-mid 60 to 70% stenosis.     There are moderate right to left collaterals to the circumflex.            BYPASS GRAFTS     LIMA-LAD Widely patent with less than 10% [id number] stenosis, the distal LAD has stenosis as noted above.         SVG-D1  100% proximal/mid-distal          SVG-circumflex  100% [id number]             Findings and plans as noted below were discussed with the patient and family, they understand/agree      4/13/21 [facility]isc[facility]  Summary    Large area, reversible, moderate to severe intensity perfusion defect    involving the basal to apical anterolateral, mid to apical anterior and    apex. Mixed ischemia/scar of the inferolateral territory. Decreased wall    motion and myocardial thickening of the above segments. Post-stress LVEF    visually estimated 45-50%. Overall findings represent a high risk scan. Limited echo 3/12/21  Conclusions    Summary   Limited echo for left ventricle systolic function. Definity is used for   myocardial border enhancement. LV systolic function is mildly reduced with a visually estimated EF=45-50%. The left ventricle is normal in size with normal wall thickness. More prominent Inferior HK on certain views. Indeterminate diastolic function.     Limited ECHO 9/29/20   Summary   Limited only f/u for LVEF post op CABG   Technically difficult examination.   Normal left ventricular systolic function with ejection fraction of 55-60%.   No regional wall motion abnormalites are seen.   Compared to previous study from 8- no changes noted in left   ventricular function.     [facility]isc[facility] 9/22/20   Summary     Increase left ventricular systolic volume with hypokinesis/decreased     myocardial thickening of the inferolateral segment. Large area of inducible     ischemia of the LAD and left circumflex territories.  Post-stress LVEF normal     at 56%.     Overall findings represent highly abnormal study, high risk scan.          [facility] 9/22/20  1. Left main coronary artery has moderate calcific disease. It gave off the left anterior descending artery and left circumflex.     2. Left anterior descending artery has severe atherosclerotic disease.  It was moderate in size. It gave off a large early diagonal branch that has ostial 90%. The LAD covered the entire apex of the left ventricle.      3. Left circumflex has severe atherosclerotic disease.  It was moderate in size. There was a moderate sized obtuse marginal branch that is occluded.     4. Right coronary artery has mild atherosclerotic disease. It was large in size and was the dominant artery.             ~there is faint collaterals to the LAD     5. Left ventriculogram showed normal LVEF at 55-60%. Wall motion was normal . There was no significant mitral valve or aortic valve disease noted. LVEDP was normal. There was no gradient noted across the aortic valve during pullback of the catheter.        ECHO 8/19/20   Summary   LV systolic function is normal with EF estimated at 55-60%. Endocardium not   entirely well visualized but no obvious segmental wall motion abnormalities.   There is moderate concentric left ventricular hypertrophy.   Normal left ventricular diastolic filling pressure.   Mild mitral annular calcification.   Valves are not entirely well visualized but there is no obvious structural   abnormality or significant color flow abnormality noted on doppler.   Unable to obtain SPAP due to lack of tricuspid regurgitation. Impression and Plan      1. Progressive angina and dyspnea with exertion, fatigue over the prior months with catheterization yesterday showing occluded vein grafts to diagonal and circumflex. 2. Ischemic Cardiomyopathy with decline in LVEF following bypass  3. History of congestive heart failure - volume depleted this admission, now euvolemic  4. Mobitz I with occasional 2:1 block   5.  Diverticulitis -treatment ongoing, continues have blood loss in stools with stable H/H since admission, 12.8 today  6. Lisinopril intolerance due to cough         Patient Active Problem List   Diagnosis    Crush injury of right foot    Shortness of breath    Chest pain    Erectile dysfunction    Hyperglycemia    Anxiety    Depression    Tobacco abuse    History of alcohol abuse    Fatigue    OANH (obstructive sleep apnea)    Hypersomnia    Chronic obstructive pulmonary disease (HCC)    Lumbar radiculopathy    HNP (herniated nucleus pulposus), lumbar    Spondylosis without myelopathy or radiculopathy, lumbar region    DDD (degenerative disc disease), lumbar    Lumbar spine pain    Acute respiratory distress    Class 3 severe obesity with body mass index (BMI) of 45.0 to 49.9 in adult ([facility] [address]    Pneumonia, primary atypical    Acute respiratory failure with hypoxia ([address]    Moderate protein-calorie malnutrition (HCC)    Acute respiratory failure (HCC)    Acute on chronic respiratory failure with hypoxemia (HCC)    Acute diastolic congestive heart failure (HCC)    Hyperlipidemia    Insomnia    Morbid obesity with BMI of 45.0-49.9, adult (HCC)    Abnormal cardiovascular stress test    Coronary artery disease involving native heart with angina pectoris ([address]    S/P three vessel coronary artery bypass    Acute kidney injury ([address]    Acute diverticulitis    Chronic systolic congestive heart failure (HCC)    Mobitz type I Wenckebach atrioventricular block    Asystole (HCC)    Ischemic cardiomyopathy    Diverticulitis of colon    CAD in native artery       PLAN:  1. Continues on aspirin and Plavix  2. Continue statin   3. Continue BB, adding nitro drip for chest pain control  4. Adding heparin drip  5. Monitor rhythms, intermittent Jatin Getting. Evaluated by EP this admission with no high-grade AV block. Plan for cardiac monitor discharge and to continue metoprolol throughout.   6.  Follow-up results of abdominal CTA with runoff for preprocedural planning    Plan for complex PCI +/- Impella with Dr. Jose L Flaherty on Monday. I will address the patient's cardiac risk factors and adjusted pharmacologic treatment as needed. In addition, I have reinforced the need for patient directed risk factor modification. All questions and concerns were addressed to the patient/family. Alternatives to my treatment were discussed. Thank you for allowing us to participate in the care of Christ Fernandez. Please call me with any questions [phone].     Ramy Monique MD, [facility]  Cardiovascular Disease  [facility]  [phone] [facility]  [phone] [address]  4/15/2021 12:44 PM

## 2021-04-15 NOTE — CONSULTS
Department of Cardiovascular & Thoracic Surgery  History and Physical          DIAGNOSIS:  Multivessel CAD     CHIEF COMPLAINT:  Shortness of breath, fatigue     History Obtained From:  patient, electronic medical record    HISTORY OF PRESENT ILLNESS:      The patient is a 64 y.o. male with significant past medical history of CAD s/p CABG x 3 (September 2020, Dr. Tanja Hoover), HLD, COPD, OANH, and tobacco abuse who presented to [facility] on 4/8 with shortness of breath, fatigue, chest pain and abdominal pain with N/V. Symptoms on ongoing for several days. Pt states he was unable to walk to the mailbox and back. ED workup significant for creatinine of 2.6, leukocytosis of 14.5, negative troponin and EKG without acute findings. CT AP showing uncomplicated diverticulitis. The patient was started on abx and admitted for further evaluation and treatment. Cardiology was consulted on 4/10 for Mobitz I and \"short asystole with 3 P waves and no QRS complexes\". [facility] nuclear stress test 4/13 was abnormal. Of note the patient did have guiac + stools. H&H remained stable. The patient was transferred to [facility] on 4/14 for LHC and EP consultation. LHC 4/14 with Dr. Karla Bates showing occlusions of 2/3 bypass grafts. We are consulted for consideration of surgical intervention.       Past Medical History:        Diagnosis Date    Acute diverticulitis 04/10/2021    Acute kidney injury ([address] 04/08/2021    Acute on chronic respiratory failure with hypoxemia (HCC)     Acute respiratory failure ([address] 08/19/2020    Acute respiratory failure with hypoxia ([address]     Anxiety 01/06/2020    Aortic valve calcification 09/2020    seen on lung CT    CAD in native artery 04/14/2021    Chronic obstructive pulmonary disease ([address] 09/03/2019    PFT done 9/03/19    Chronic systolic congestive heart failure ([address] 04/10/2021    Class 3 severe obesity with body mass index (BMI) of 45.0 to 49.9 in adult [facility])     Coronary artery calcification 09/2020 seen on lung ct    Coronary artery disease involving native heart with angina pectoris ([facility] 09/22/2020    Crush injury of right foot 07/23/2015    DDD (degenerative disc disease), lumbar 01/06/2020    Depression 01/06/2020    Diverticulitis of colon 04/10/2021    Erectile dysfunction 01/06/2020    Fatigue 01/06/2020    History of alcohol abuse 01/06/2020    History of drug use     HNP (herniated nucleus pulposus), lumbar 01/06/2020    Hyperglycemia 01/06/2020    Hyperlipidemia     Hypersomnia 01/06/2020    Insomnia     Ischemic cardiomyopathy     Kidney stone     Lumbar radiculopathy 01/06/2020    Lumbar spine pain 01/06/2020    LVH (left ventricular hypertrophy)     seen on echo 8/2020, moderate    Mobitz type I Wenckebach atrioventricular block 04/10/2021    Moderate protein-calorie malnutrition ([facility] 03/17/2020    Observed sleep apnea 01/06/2020    OANH (obstructive sleep apnea) 01/06/2020    Pneumonia, primary atypical     Reactive depression 01/06/2020    S/P three vessel coronary artery bypass 10/26/2020    Spondylosis without myelopathy or radiculopathy, lumbar region 01/06/2020    Tobacco abuse 01/06/2020    Tobacco use 01/06/2020       Past Surgical History:        Procedure Laterality Date    CHOLECYSTECTOMY      CORONARY ARTERY BYPASS GRAFT N/A 9/25/2020    CORONARY ARTERY BYPASS GRAFTING X3, INTERNAL MAMMARY ARTERY, SAPHENOUS VEIN GRAFT, ON PUMP, STERNAL PLATING, 5 LEVEL BILATERAL INTERCOSTAL NERVE BLOCK, PLATELET GEL APPLICATION performed by Jareth Henderson MD at [address] [facility]  03/16/2011    cystoscopy left ureteroscopy, left retrograde, double J stent placement    FINGER AMPUTATION Right 2/2/2021    RIGHT MIDDLE FINGER IRRIGATION AND DEBRIDEMENT WITH REVISION AMPUTATION AND BONE SHORTENING, POSSIBLE NAIL ABLATION performed by Yvette Cristina MD at [address] Right 12/24/2019    RIGHT LUMBAR FIVE SACRAL ONE TRANSFORAMINAL EPIDURAL STEROID INJECTION SITE CONFIRMED BY FLUOROSCOPY performed by Yusuf Perdomo MD at [address] Right 1/7/2020    RIGHT LUMBAR FOUR AND LUMBAR FIVE TRANSFORAMINAL EPIDURAL STEROID INJECTION SITE CONFIRMED BY FLUOROSCOPY performed by Yusuf Perdomo MD at [place] [address]       Medications Prior to Admission:   Medications Prior to Admission: amoxicillin-clavulanate (AUGMENTIN) 875-125 MG per tablet, Take 1 tablet by mouth 2 times daily for 5 days  lisinopril (PRINIVIL;ZESTRIL) 20 MG tablet, Take 0.5 tablets by mouth daily  furosemide (LASIX) 80 MG tablet, Take 1 tablet by mouth 2 times daily  ipratropium (ATROVENT) 0.02 % nebulizer solution, Take 2.5 mLs by nebulization 3 times daily  Handicap Placard MISC, by Does not apply route Duration - 5 years  traZODone (DESYREL) 100 MG tablet, Take 1 tablet by mouth nightly (Patient taking differently: Take 100 mg by mouth 2 times daily Am pm)  sertraline (ZOLOFT) 100 MG tablet, Take 1 tablet by mouth daily  acetaminophen (TYLENOL) 500 MG tablet, Take 2 tablets by mouth 3 times daily for 10 days  clopidogrel (PLAVIX) 75 MG tablet, Take 1 tablet by mouth daily  metoprolol tartrate (LOPRESSOR) 25 MG tablet, Take 1 tablet by mouth 2 times daily Hold this medication for pulse <74 or systolic BP <013  ibuprofen (ADVIL;MOTRIN) 800 MG tablet, Take 1 tablet by mouth 3 times daily  atorvastatin (LIPITOR) 80 MG tablet, Take 1 tablet by mouth nightly  aspirin 81 MG EC tablet, Take 1 tablet by mouth daily  albuterol (PROVENTIL) (2.5 MG/3ML) 0.083% nebulizer solution, Take 3 mLs by nebulization every 6 hours as needed for Wheezing or Shortness of Breath  albuterol sulfate HFA (PROVENTIL HFA) 108 (90 Base) MCG/ACT inhaler, Inhale 2 puffs into the lungs every 6 hours as needed for Wheezing    Allergies:  Dilaudid [hydromorphone hcl] and Codeine    Social History:    TOBACCO:   reports that he quit smoking about 13 months ago.  His smoking use included cigarettes. He has a 70.00 pack-year smoking history. He has never used smokeless tobacco.  ETOH:   reports no history of alcohol use. CAFFEINE ABUSE:  No  DRUGS:   reports previous drug use. 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 Eyes:  No glaucoma, cataracts. ENMT:  No nosebleeds, deviated septum. Cardiac:  + Arrhythmias, chest pain. Vascular:  No claudication, varicosities. GI:  No PUD, heartburn. :  No frequent UTIs. Hx of kidney stones. Musculoskeletal:  No arthritis, gout. +Hx chronic back pain. Respiratory:  +SOB. +Hx COPD, OANH, tobacco abuse. Integumentary:  No dermatitis, itching, rash. Neurological:  No stroke, TIAs, seizures. Psychiatric:  +Hx depression. No anxiety. Endocrine: No diabetes, thyroid issues. Hematologic:  No bleeding, easy bruising. Immunologic:  No known cancer, steroid therapies. PHYSICAL EXAM:    VITALS:  /75   Pulse 74   Temp 97.7 °F (36.5 °C) (Oral)   Resp 18   Ht 5' 4\" (1.626 m)   Wt 283 lb (128.4 kg)   SpO2 94%   BMI 48.58 kg/m²     Constitutional:  Well developed and nourished male. No acute distress. Morbidly obesity. Eyes:  lids and lashes normal, pupils equal, round and reactive to light, extra ocular muscles intact, sclera clear, conjunctiva normal    Head/ENT:   normal teeth, gums, & palate. Moist mucus membranes. No cyanosis or pallor. Neck:  supple, symmetrical, trachea midline, no lymphadenopathy, no jugular venous distension, no carotid bruits and MASSES:  no masses. No thyromegaly. Lungs:  no increased work of breathing, good air exchange, no retractions and clear to auscultation. BILITOT 0.5 04/09/2021    ALKPHOS 92 04/09/2021    AST 13 04/09/2021    ALT 16 04/09/2021     PT/INR:    Lab Results   Component Value Date    PROTIME 12.7 10/19/2020    INR 1.10 10/19/2020     PTT:    Lab Results   Component Value Date    APTT 30.7 04/15/2021     Last 3 Troponin:    Lab Results   Component Value Date    TROPONINI <0.01 04/15/2021    TROPONINI <0.01 04/09/2021    TROPONINI <0.01 04/08/2021     CXRAY:  4/8/21  FINDINGS:   Postsurgical changes overlying the mediastinum. The cardiac size is normal. No   acute infiltrates or pleural effusions are seen.  Accentuated bibasal   bronchovascular markings. No acute bony abnormalities. The hilar structures   are normal.     TTE: 8/19/20 Dr. Claudio Ko   Summary   LV systolic function is normal with EF estimated at 55-60%. Endocardium not   entirely well visualized but no obvious segmental wall motion abnormalities. There is moderate concentric left ventricular hypertrophy. Normal left ventricular diastolic filling pressure. Mild mitral annular calcification. Valves are not entirely well visualized but there is no obvious structural   abnormality or significant color flow abnormality noted on doppler. Unable to obtain SPAP due to lack of tricuspid regurgitation. TTE: 3/12 Dr. Mckeon [facility] echo for left ventricle systolic function. Definity is used for   myocardial border enhancement. LV systolic function is mildly reduced with a visually estimated EF=45-50%. The left ventricle is normal in size with normal wall thickness. More prominent Inferior HK on certain views. Indeterminate diastolic function.     CT Chest:  9/18/20  FINDINGS:   Pulmonary Arteries: Pulmonary arteries are adequately opacified for   evaluation.  No evidence of intraluminal filling defect to suggest pulmonary   embolism.  Main pulmonary artery is normal in caliber.       Mediastinum: Visualized thyroid unremarkable.  No mediastinal or hilar   lymphadenopathy is identified. Uma Hammersmith is unremarkable.       Cardiac chambers unremarkable.  The thoracic aorta is normal in caliber   without evidence of dissection.       Lungs/pleura: No focal infiltrates are identified within the lungs.  Airways   are patent.  No pleural effusion.  No pneumothorax.       Upper Abdomen: Limited images of the upper abdomen are unremarkable.       Soft Tissues/Bones: No acute bone or soft tissue abnormality. CT AP: 4/8/21  FINDINGS:   Lower Chest: The lung bases are clear.  Coronary artery calcifications are   noted.       Organs: The liver is homogeneous and normal in attenuation.  Cholecystectomy   clips are present.  The pancreas, spleen and adrenal glands are unremarkable.       The kidneys are symmetrical in size.  A nonobstructing 3 mm calculus is in   the superior pole of the left kidney.  There is no hydronephrosis.       GI/Bowel: The stomach and small bowel are unremarkable. Sallie Lolling is   diverticulosis of the descending and sigmoid colon.  There is mild fat   stranding and thickening of the combined fascia at the junction of the   descending and sigmoid segments.  The appendix is not visualized.       Pelvis: Urinary bladder and prostate gland are unremarkable.       Peritoneum/Retroperitoneum: There is mild aorto iliac calcification, without   aneurysm.  No adenopathy is identified.       Bones/Soft Tissues: At L5-S1 there is mild retrolisthesis.  Multilevel   spondylosis.  No acute osseous abnormality is seen.  Abdominal wall is   unremarkable. [facility]:  4/14/21 Dr. Don Speak      LVGRAM     LVEDP  6   GRADIENT ACROSS AORTIC VALVE  none   LV FUNCTION EF 60%   WALL MOTION  normal   MITRAL REGURGITATION  mild         CORONARY ARTERIES     LM Proximal less than 10% stenosis, mid-distal haziness with calcification and 70% eccentric stenosis.       LAD  Proximal-mid diffuse tubular 80% stenosis, there is mid-distal bidirectional flow noted.   Distal vessel is visualized on LIMA angiogram, this shows 60% distal stenosis.     D1 is a large vessel with tortuosity noted and ostial/proximal 70% stenosis followed by mid vessel calcification with 50% stenosis and mid-distal 99% stenosis.       LCX Medium to large size vessel, proximal 30 to 40% stenosis.     OM 3 appears to be the largest OM and is 100% proximal-mid , distal vessel is seen through left to left collaterals and appears to have less than 10% stenosis.       RCA Dominant, tortuous, proximal-mid 60 to 70% stenosis.     There are moderate right to left collaterals to the circumflex.         BYPASS GRAFTS     LIMA-LAD Widely patent with less than 10% [id number]-[id number] stenosis, the distal LAD has stenosis as noted above.         SVG-D1  100% proximal/mid-distal          SVG-circumflex  100% [id number]                ASSESSMENT AND PLAN:  STS Cardiac Surgery Risk profile: CABG     Mortality:  6.80%  Renal Failure:  3.94%  Permanent Stroke:  0.62%  Prolonged Ventilation:  24.80%  Deep Sternal Infection:  1.30%  Reoperation:  3.42%  Morbidity and Mortality:  29.00%  Short LOS:  23.78%  Long LOS:  12.77%    Mr. Abiel Dunlap is a 64 y.o. morbidly obese male with history of CAD s/p CABG x 3 (9/2020 with Dr. Inna Coffey), COPD, OANH and tobacco abuse admitted for acute uncomplicated diverticulitis (on Rocephin and Flagyl) and found on [facility] to have occlusions to 2/3 bypass grafts (SVG-D1 and SVG-circ), severe LM disease. On Plavix, received dose today. Now on heparin gtt. Dr. Inna Coffey discussed case with Dr. Tanya Schaeffer- agree best plan will be for PCI (planned for Monday). Will sign off. Please call if needed. Ibrahima Woodard CNP   4/15/2021  9:35 AM  I have examined the patient and reviewed his imaging studies and I have discussed this case with Dr. Naz Arriaga this morning. The plans are as outlined above with planned PCI on Monday per Dr. Tanya Schaeffer. We will sign off but be available if necessary.   Note reviewed, events of last 24

## 2021-04-15 NOTE — CONSULTS
Pharmacy to Manage Heparin Infusion per [facility]    Pt wt = 915.6 kg,  Adjusted wt = 86.9 kg  Baseline aPTT = pending  Begin after baseline aPTT is obtained    Low Dose Heparin Infusion  Heparin 4000 unit IVP bolus followed by Heparin infusion at 12 units/kg/hr (recommended initial max dose 1000 units/hr). Recheck aPTT in 6 hours. Goal aPTT = 49-76 seconds. PRISCILA Ruby Ph. 4/15/2021 10:56 AM      4/15/21  2130  Low dose heparin GTT:  APTT at 2039 is 33.2seconds. Give a 4,000 unit IV bolus dose of heparin and increase the infusion to 13.5mL/hr. Recheck the aPTT at 0400 4/16. Clyde Maxwell PharmD 4/15/2021 9:30 PM    4/16 0445  aptt = 48.1 sec  2000 bolus; rate= 15.2 ml/hr. Next aptt 1100  Reinier Hanson4/16/2021 5:08 AM    4/16 1259  aPTT - 49.3 sec  Continue heparin drip at 15.2 mL/hr  Next aPTT at [address] 4/16/2021 1:34 PM    4/16 1944   Aptt = 46.3  Administer 2000 unit bolus and increase rate to 16.9 ml/hr  Next aPTT 0245  Najma Covarrubias PharmD 8:39 PM EDT 4/16/21 4/17 0232  aptt = 80.1 sec  Rate = 15.2 ml/hr. Next aptt 1000  Reinier Hanson4/17/2021 4:28 AM    4/17 1001  aPTT - 67.7 sec  Continue heparin drip at 15.2 mL/hr  Next aPTT at 1531 Rose Marie PharmD 4/17/2021 10:41 AM    Aptt = 50.5 sec at 1620. Continue heparin drip at 15.2ml/hr  Daily aptt ordered  Paige Escamilla [facility]  4/17/2021 at 6:26 PM    4/18 0525  aptt = 56.5 sec  Continue current rate. Next aptt daily  Reinier Hanson4/18/2021 6:10 AM    4/19 0553  aptt = 55.4 sec  Continue current rate.   Next aptt daily  Reinier Hanson.4/19/2021 6:47 AM

## 2021-04-15 NOTE — PROGRESS NOTES
Patient currently has 1 access with Heparin and Nitro going. Patient needs a second access for IV abx. Accessed one in his [facility] but patient wanted it removed d/t it being uncomfortable. 2 RN's tried again, with no success.

## 2021-04-15 NOTE — PROGRESS NOTES
Called ICU charge twice but no answer, pacu clinical nurse said ultrasound guided nurse is not gone for the day. Will continue to monitor.  Electronically signed by Jerrod Gonsales RN on 4/15/2021 at 6:47 PM

## 2021-04-15 NOTE — H&P
([facility]     Coronary artery calcification 09/2020    seen on lung ct    Coronary artery disease involving native heart with angina pectoris ([facility] 09/22/2020    Crush injury of right foot 07/23/2015    DDD (degenerative disc disease), lumbar 01/06/2020    Depression 01/06/2020    Diverticulitis of colon 04/10/2021    Erectile dysfunction 01/06/2020    Fatigue 01/06/2020    History of alcohol abuse 01/06/2020    History of drug use     HNP (herniated nucleus pulposus), lumbar 01/06/2020    Hyperglycemia 01/06/2020    Hyperlipidemia     Hypersomnia 01/06/2020    Insomnia     Ischemic cardiomyopathy     Kidney stone     Lumbar radiculopathy 01/06/2020    Lumbar spine pain 01/06/2020    LVH (left ventricular hypertrophy)     seen on echo 8/2020, moderate    Mobitz type I Wenckebach atrioventricular block 04/10/2021    Moderate protein-calorie malnutrition ([facility] 03/17/2020    Observed sleep apnea 01/06/2020    OANH (obstructive sleep apnea) 01/06/2020    Pneumonia, primary atypical     Reactive depression 01/06/2020    S/P three vessel coronary artery bypass 10/26/2020    Spondylosis without myelopathy or radiculopathy, lumbar region 01/06/2020    Tobacco abuse 01/06/2020    Tobacco use 01/06/2020       Past Surgical History:          Procedure Laterality Date    CHOLECYSTECTOMY      CORONARY ARTERY BYPASS GRAFT N/A 9/25/2020    CORONARY ARTERY BYPASS GRAFTING X3, INTERNAL MAMMARY ARTERY, SAPHENOUS VEIN GRAFT, ON PUMP, STERNAL PLATING, 5 LEVEL BILATERAL INTERCOSTAL NERVE BLOCK, PLATELET GEL APPLICATION performed by Ermelinda Mckeon MD at [address] [place] [address]  03/16/2011    cystoscopy left ureteroscopy, left retrograde, double J stent placement    FINGER AMPUTATION Right 2/2/2021    RIGHT MIDDLE FINGER IRRIGATION AND DEBRIDEMENT WITH REVISION AMPUTATION AND BONE SHORTENING, POSSIBLE NAIL ABLATION performed by Regulo Miles MD at [address] [place] [address] Right 12/24/2019 RIGHT LUMBAR FIVE SACRAL ONE TRANSFORAMINAL EPIDURAL STEROID INJECTION SITE CONFIRMED BY FLUOROSCOPY performed by Loree Granados MD at [address] Right 1/7/2020    RIGHT LUMBAR FOUR AND LUMBAR FIVE TRANSFORAMINAL EPIDURAL STEROID INJECTION SITE CONFIRMED BY FLUOROSCOPY performed by Loree Granados MD at [facility]       Medications Prior to Admission:      Prior to Admission medications    Medication Sig Start Date End Date Taking?  Authorizing Provider   amoxicillin-clavulanate (AUGMENTIN) 875-125 MG per tablet Take 1 tablet by mouth 2 times daily for 5 days 4/14/21 4/19/21  Mickel Halsted, PA-C   lisinopril (PRINIVIL;ZESTRIL) 20 MG tablet Take 0.5 tablets by mouth daily 3/15/21   Cyril Olivas MD   furosemide (LASIX) 80 MG tablet Take 1 tablet by mouth 2 times daily 3/3/21 3/3/22  Cyril Olivas MD   ipratropium (ATROVENT) 0.02 % nebulizer solution Take 2.5 mLs by nebulization 3 times daily 3/2/21   ANICETO Abbott CNP   Handicap Placard MISC by Does not apply route Duration - 5 years 3/1/21   ANICETO Abbott CNP   traZODone (DESYREL) 100 MG tablet Take 1 tablet by mouth nightly  Patient taking differently: Take 100 mg by mouth 2 times daily Am pm 3/1/21   ANICETO Abbott CNP   sertraline (ZOLOFT) 100 MG tablet Take 1 tablet by mouth daily 3/1/21   ANICETO Abbott CNP   acetaminophen (TYLENOL) 500 MG tablet Take 2 tablets by mouth 3 times daily for 10 days 1/22/21 4/8/21  Donato Urban MD   clopidogrel (PLAVIX) 75 MG tablet Take 1 tablet by mouth daily 1/13/21   Cyril Olivas MD   metoprolol tartrate (LOPRESSOR) 25 MG tablet Take 1 tablet by mouth 2 times daily Hold this medication for pulse <77 or systolic BP <322 87/47/41   ANICETO Abbott CNP   ibuprofen (ADVIL;MOTRIN) 800 MG tablet Take 1 tablet by mouth 3 times daily 10/30/20   ANICETO Abbott CNP   atorvastatin (LIPITOR) 80 MG tablet Take 1 tablet by mouth nightly 10/28/20   Godfrey Spence MD   aspirin 81 MG EC tablet Take 1 tablet by mouth daily 9/30/20   ANICETO Ocasio CNP   albuterol (PROVENTIL) (2.5 MG/3ML) 0.083% nebulizer solution Take 3 mLs by nebulization every 6 hours as needed for Wheezing or Shortness of Breath 9/25/19   ANICETO Yeager CNP   albuterol sulfate HFA (PROVENTIL HFA) 108 (90 Base) MCG/ACT inhaler Inhale 2 puffs into the lungs every 6 hours as needed for Wheezing 5/17/19   Wilmer Hart MD       Allergies:  Dilaudid [hydromorphone hcl] and Codeine    Social History:          TOBACCO:   reports that he quit smoking about 13 months ago. His smoking use included cigarettes. He has a 70.00 pack-year smoking history. He has never used smokeless tobacco.  ETOH:   reports no history of alcohol use. E-Cigarettes/Vaping Use     Questions Responses    E-Cigarette/Vaping Use Never User    Start Date     Passive Exposure     Quit Date     Counseling Given     Comments             Family History:      Reviewed in detail         Problem Relation Age of Onset    Heart Disease Mother     Other Mother         copd    Liver Disease Father     High Blood Pressure Sister     No Known Problems Sister        REVIEW OF SYSTEMS:     Constitutional:  No Fever, No Chills, No Night Sweats  ENT/Mouth:  No Nasal Congestion,  No Hoarseness, No new mouth lesion  Eyes:  No Eye Pain, No Redness, No Discharge  Cardiovascular:  No Chest Pain, No Orthopnea, No Palpitations  Respiratory:  No Cough, No Sputum, No Dyspnea  Gastrointestinal: No Vomiting, No Diarrhea, No abdominal pain  Genitourinary: No Urinary Frequency, No Hematuria, No Urinary pain  Musculoskeletal:  No worsening Arthralgias, No worsening Myalgias  Skin:  No new Skin Lesions, No new skin rash  Neuro:  No new weakness, No new numbness.   Psych:  No suicial ideation, No Violence ideation    PHYSICAL EXAM PERFORMED:    /75   Pulse 78   Temp 99 °F (37.2 °C) (Oral) Resp 16   Ht 5' 4\" (1.626 m)   Wt 283 lb (128.4 kg)   SpO2 95%   BMI 48.58 kg/m²     General appearance:  mild acute distress, appears older than stated age  [de-identified]:   atraumatic, sclera anicteric, Conjunctivae clear. Neck: Supple,Trachea midline, no goiter  Respiratory:minimal accessory muscle usage, Normal respiratory effort. Clear to auscultation, bilaterally without wheezing  Cardiovascular:  Regular rate and rhythm, capillary refill 2 seconds  Abdomen: Morbid obese soft, non-tender, non-distended with normal bowel sounds. Musculoskeletal:  No clubbing, cyanosis. trace edema LE bilaterally. Skin: turgor normal.  No new rashes or lesions. Neurologic: Alert and oriented x4, no new focal sensory/motor deficits. Labs:     Recent Labs     04/12/21  0257 04/13/21  1152   WBC  --  10.5   HGB 12.8* 13.3*   HCT 38.5* 40.4*   PLT  --  170     Recent Labs     04/13/21  1153      K 4.5      CO2 30   BUN 10   CREATININE 0.9   CALCIUM 9.4     No results for input(s): AST, ALT, BILIDIR, BILITOT, ALKPHOS in the last 72 hours. No results for input(s): INR in the last 72 hours. No results for input(s): Tennis Curran in the last 72 hours. Urinalysis:      Lab Results   Component Value Date    NITRU Negative 04/08/2021    WBCUA 3-5 03/09/2020    BACTERIA 1+ 03/09/2020    RBCUA 5-10 03/09/2020    BLOODU Negative 04/08/2021    SPECGRAV 1.020 04/08/2021    GLUCOSEU Negative 04/08/2021    GLUCOSEU NEGATIVE 03/16/2011       Radiology:     EKG:  I have reviewed the EKG with the following interpretation:   ST elevated MI    No orders to display       ASSESSMENT AND PLAN:    JAYNE/Sunita Moseley 1106 Problems    Diagnosis Date Noted    CAD in native artery [I25.10] 04/14/2021     Severe multivessel disease: CAD  CT surgery consulted for CABG  Continued aspirin and Plavix and high intensity statin  Cardiology on board    Bradycardia: Per EP  4 week monitor at time of discharge.   - Transition to metoprolol succinate and GDMT per cardiology. - Follow up as outpatient to review dizziness and monitor results. Acute uncomplicated diverticulitis:  Continue Rocephin and Flagyl for 10 days currently in day 5    Ischemic cardiomyopathy:  Held Lasix lisinopril for now  Metoprolol as recommended by EP  Cardiology consulted for recommendations. OANH: Never really had a sleep study done as recommended. Class III obesity:Complicating assessment and treatment. Placing patient at risk for multiple co-morbidities as well as early death and contributing to the patient's presentation.  Education, and counseling  COPD: Not in acute exacerbation, nebs    Diet: NPO except meds ordered    DVT Prophylaxis: held    Dispo:   Expected LOS greater than two [place], DO

## 2021-04-15 NOTE — PROGRESS NOTES
Pt experiencing chest pain that radiates to back. EKG and troponin ordered. EKG was NSR. Nitro was given . Patient states chest pain has resolved but can still has discomfort in his back VSS. Will continue to monitor.

## 2021-04-16 ENCOUNTER — ANESTHESIA EVENT (OUTPATIENT)
Dept: CARDIAC CATH/INVASIVE PROCEDURES | Age: 61
DRG: 215 | End: 2021-04-16
Payer: COMMERCIAL

## 2021-04-16 LAB
APTT: 239.8 SEC (ref 24.2–36.2)
APTT: 46.3 SEC (ref 24.2–36.2)
APTT: 48.1 SEC (ref 24.2–36.2)
APTT: 49.3 SEC (ref 24.2–36.2)
POC ACT LR: 154 SEC
POC ACT LR: 209 SEC
POC ACT LR: 242 SEC
REASON FOR REJECTION: NORMAL
REJECTED TEST: NORMAL

## 2021-04-16 PROCEDURE — 36415 COLL VENOUS BLD VENIPUNCTURE: CPT

## 2021-04-16 PROCEDURE — 2580000003 HC RX 258: Performed by: NURSE PRACTITIONER

## 2021-04-16 PROCEDURE — 6370000000 HC RX 637 (ALT 250 FOR IP): Performed by: INTERNAL MEDICINE

## 2021-04-16 PROCEDURE — 2580000003 HC RX 258: Performed by: INTERNAL MEDICINE

## 2021-04-16 PROCEDURE — 6360000002 HC RX W HCPCS: Performed by: INTERNAL MEDICINE

## 2021-04-16 PROCEDURE — 85730 THROMBOPLASTIN TIME PARTIAL: CPT

## 2021-04-16 PROCEDURE — 2500000003 HC RX 250 WO HCPCS: Performed by: INTERNAL MEDICINE

## 2021-04-16 PROCEDURE — 99233 SBSQ HOSP IP/OBS HIGH 50: CPT | Performed by: INTERNAL MEDICINE

## 2021-04-16 PROCEDURE — 2060000000 HC ICU INTERMEDIATE R&B

## 2021-04-16 RX ORDER — HEPARIN SODIUM 1000 [USP'U]/ML
2000 INJECTION, SOLUTION INTRAVENOUS; SUBCUTANEOUS ONCE
Status: COMPLETED | OUTPATIENT
Start: 2021-04-16 | End: 2021-04-16

## 2021-04-16 RX ORDER — HEPARIN SODIUM 10000 [USP'U]/100ML
1520 INJECTION, SOLUTION INTRAVENOUS CONTINUOUS
Status: DISCONTINUED | OUTPATIENT
Start: 2021-04-16 | End: 2021-04-19

## 2021-04-16 RX ADMIN — MORPHINE SULFATE 2 MG: 4 INJECTION, SOLUTION INTRAMUSCULAR; INTRAVENOUS at 03:16

## 2021-04-16 RX ADMIN — HEPARIN SODIUM 1520 UNITS/HR: 10000 INJECTION, SOLUTION INTRAVENOUS at 10:02

## 2021-04-16 RX ADMIN — Medication 5 MG: at 20:52

## 2021-04-16 RX ADMIN — TRAZODONE HYDROCHLORIDE 100 MG: 50 TABLET ORAL at 20:52

## 2021-04-16 RX ADMIN — METRONIDAZOLE 500 MG: 500 INJECTION, SOLUTION INTRAVENOUS at 09:46

## 2021-04-16 RX ADMIN — MORPHINE SULFATE 2 MG: 4 INJECTION, SOLUTION INTRAMUSCULAR; INTRAVENOUS at 12:47

## 2021-04-16 RX ADMIN — SERTRALINE 100 MG: 50 TABLET, FILM COATED ORAL at 09:38

## 2021-04-16 RX ADMIN — HEPARIN SODIUM 2000 UNITS: 1000 INJECTION INTRAVENOUS; SUBCUTANEOUS at 05:26

## 2021-04-16 RX ADMIN — HEPARIN SODIUM 2000 UNITS: 1000 INJECTION INTRAVENOUS; SUBCUTANEOUS at 20:49

## 2021-04-16 RX ADMIN — Medication 10 ML: at 10:04

## 2021-04-16 RX ADMIN — METRONIDAZOLE 500 MG: 500 INJECTION, SOLUTION INTRAVENOUS at 00:11

## 2021-04-16 RX ADMIN — ATORVASTATIN CALCIUM 80 MG: 80 TABLET, FILM COATED ORAL at 20:51

## 2021-04-16 RX ADMIN — MORPHINE SULFATE 2 MG: 4 INJECTION, SOLUTION INTRAMUSCULAR; INTRAVENOUS at 09:38

## 2021-04-16 RX ADMIN — MORPHINE SULFATE 2 MG: 4 INJECTION, SOLUTION INTRAMUSCULAR; INTRAVENOUS at 06:21

## 2021-04-16 RX ADMIN — MORPHINE SULFATE 2 MG: 4 INJECTION, SOLUTION INTRAMUSCULAR; INTRAVENOUS at 19:54

## 2021-04-16 RX ADMIN — MORPHINE SULFATE 2 MG: 4 INJECTION, SOLUTION INTRAMUSCULAR; INTRAVENOUS at 00:11

## 2021-04-16 RX ADMIN — METOPROLOL TARTRATE 25 MG: 25 TABLET, FILM COATED ORAL at 20:52

## 2021-04-16 RX ADMIN — ASPIRIN 81 MG: 81 TABLET, COATED ORAL at 09:38

## 2021-04-16 RX ADMIN — MORPHINE SULFATE 2 MG: 4 INJECTION, SOLUTION INTRAMUSCULAR; INTRAVENOUS at 15:57

## 2021-04-16 RX ADMIN — SODIUM CHLORIDE, PRESERVATIVE FREE 10 ML: 5 INJECTION INTRAVENOUS at 20:55

## 2021-04-16 RX ADMIN — CEFTRIAXONE SODIUM 1000 MG: 1 INJECTION, POWDER, FOR SOLUTION INTRAMUSCULAR; INTRAVENOUS at 19:48

## 2021-04-16 RX ADMIN — MORPHINE SULFATE 2 MG: 4 INJECTION, SOLUTION INTRAMUSCULAR; INTRAVENOUS at 23:39

## 2021-04-16 RX ADMIN — METRONIDAZOLE 500 MG: 500 INJECTION, SOLUTION INTRAVENOUS at 15:47

## 2021-04-16 RX ADMIN — Medication 10 ML: at 20:54

## 2021-04-16 RX ADMIN — CLOPIDOGREL BISULFATE 75 MG: 75 TABLET ORAL at 09:38

## 2021-04-16 RX ADMIN — HEPARIN SODIUM 1520 UNITS/HR: 10000 INJECTION, SOLUTION INTRAVENOUS at 05:27

## 2021-04-16 RX ADMIN — METOPROLOL TARTRATE 25 MG: 25 TABLET, FILM COATED ORAL at 09:38

## 2021-04-16 ASSESSMENT — PAIN DESCRIPTION - DESCRIPTORS
DESCRIPTORS: ACHING;CONSTANT
DESCRIPTORS: ACHING;CONSTANT;DISCOMFORT

## 2021-04-16 ASSESSMENT — PAIN DESCRIPTION - LOCATION
LOCATION: CHEST
LOCATION: CHEST

## 2021-04-16 ASSESSMENT — PAIN SCALES - GENERAL
PAINLEVEL_OUTOF10: 5
PAINLEVEL_OUTOF10: 6
PAINLEVEL_OUTOF10: 4
PAINLEVEL_OUTOF10: 5

## 2021-04-16 NOTE — PROGRESS NOTES
CARDIOLOGY PROGRESS NOTE      Patient Name: Taylor Chandler  Date of admission: 4/14/2021  9:58 AM  Admission Dx: CAD in native artery [I25.10]  Reason for Consult:  Abnormal stress test   Requesting Physician: Carmelita Moncada MD  Primary Care physician: Juliet East, ANICETO - CNP    Subjective:     Taylor Chandler is a pleasant 64 y.o. man with prior history notable for congestive heart failure with ischemic cardiomyopathy, coronary artery disease status post coronary artery bypass (LIMA to LAD, SVG to OM and SVG to diagonal) in context of unstable angina, previous smoker, and obstructive sleep apnea noncompliant with CPAP.     Vaibhav Fisher has had difficult course over the past few months with worsening dyspnea, fatigue and difficult to control BP with wild swings and multiple medication adjustments over time. We has been concerned that high BP/low BP/volume overload have contributed to his symptoms at one time or another. Noted echo repeated mid March with decline in LV function. Stress test was being arranged on OP basis to follow this up when patient presented to hospital for worsening symptoms. Having angina radiating to arm prior to presentation as well.+fatigue/SOB. He was having need to stop on way to/from mailbox on OP basis due to these symptoms. Continues to have this when ambulating in room. Patient was transferred for cardiac catheterization to [address] [place] [address] 4/14. Catheterization with Dr. Elwin Fabry showed patent LIMA to LAD with occluded vein grafts. Severe mid to distal left main disease, severe large first diagonal disease, occluded marginal with left to left collaterals. CT surgery is reevaluated and agree PCI approach next best step. I discussed with interventional/Dr. Elwin Fabry and we are planning for intervention of left main and the diagonal.       Doing well on low dose nitro gtt at 5mcg. On heparin gtt. No recurrence of angina.  He continues to have chest wall pain with movement of hours as needed for Wheezing or Shortness of Breath 9/25/19   Libby Montaño, APRN - CNP   albuterol sulfate HFA (PROVENTIL HFA) 108 (90 Base) MCG/ACT inhaler Inhale 2 puffs into the lungs every 6 hours as needed for Wheezing 5/17/19   Asad Bowers MD        CURRENT Medications:  heparin 25,000 units in dextrose 5% 250 mL (premix) infusion, Continuous  melatonin disintegrating tablet 5 mg, Nightly  nitroGLYCERIN (NITROSTAT) SL tablet 0.4 mg, Q5 Min PRN  heparin (porcine) injection 4,000 Units, PRN  heparin (porcine) injection 2,000 Units, PRN  nitroGLYCERIN 50 mg in dextrose 5% 250 mL infusion, Continuous  sodium chloride flush 0.9 % injection 5-40 mL, 2 times per day  sodium chloride flush 0.9 % injection 5-40 mL, PRN  0.9 % sodium chloride infusion, PRN  0.9 % sodium chloride infusion, PRN  sodium chloride flush 0.9 % injection 5-40 mL, 2 times per day  sodium chloride flush 0.9 % injection 5-40 mL, PRN  acetaminophen (TYLENOL) tablet 650 mg, Q6H PRN    Or  acetaminophen (TYLENOL) suppository 650 mg, Q6H PRN  polyethylene glycol (GLYCOLAX) packet 17 g, Daily PRN  promethazine (PHENERGAN) tablet 12.5 mg, Q6H PRN    Or  ondansetron (ZOFRAN) injection 4 mg, Q6H PRN  albuterol (PROVENTIL) nebulizer solution 2.5 mg, Q6H PRN  aspirin EC tablet 81 mg, Daily  atorvastatin (LIPITOR) tablet 80 mg, Nightly  cefTRIAXone (ROCEPHIN) 1000 mg IVPB in 50 mL D5W minibag, Q24H  clopidogrel (PLAVIX) tablet 75 mg, Daily  ipratropium (ATROVENT) 0.02 % nebulizer solution 0.5 mg, TID PRN  metoprolol tartrate (LOPRESSOR) tablet 25 mg, BID  metronidazole (FLAGYL) 500 mg in NaCl 100 mL IVPB premix, Q8H  morphine (PF) injection 2 mg, Q3H PRN  sertraline (ZOLOFT) tablet 100 mg, Daily  traZODone (DESYREL) tablet 100 mg, Nightly  acetaminophen (TYLENOL) tablet 650 mg, Q4H PRN        Allergies:  Dilaudid [hydromorphone hcl] and Codeine     Review of Systems:   A 14 point review of symptoms completed.  Pertinent positives identified in the HPI, all other review of symptoms negative. Objective:     Vitals:    04/16/21 0300 04/16/21 0505 04/16/21 0800 04/16/21 1115   BP: (!) 144/82  116/74 124/77   Pulse: 84  76 62   Resp: 16 18 20   Temp:   98.2 °F (36.8 °C) 98.1 °F (36.7 °C)   TempSrc:   Oral Oral   SpO2:   92% 94%   Weight:  285 lb 9.6 oz (129.5 kg)     Height:          Weight: 285 lb 9.6 oz (129.5 kg)       PHYSICAL EXAM:      General:   Anxious appearing, no acute distress   Head:  Normocephalic, atraumatic   Eyes:  Conjunctiva/corneas clear, anicteric sclerae    Nose: Nares normal, no drainage or sinus tenderness   Throat: No abnormalities of the lips, oral mucosa or tongue. Neck: Trachea midline. Neck supple with no lymphadenopathy, thyroid not enlarged, symmetric, no tenderness/mass/nodules, flat neck vv   Lungs:   Clear to auscultation bilaterally, no wheezes, no rales, no respiratory distress   Chest Wall:   Well healed sternotomy scar. Heart:  Regular rate and rhythm, normal S1, normal S2, no murmur, no rub, no S3/S4, PMI non-palpable. Abdomen:   Obese, Soft, non-tender, with normoactive bowel sounds. No masses, no hepatosplenomegaly   Extremities: No cyanosis, clubbing or pitting edema. Vascular: 2+ radial, 2+ femoral, dorsalis pedis and posterior tibial pulses bilaterally. Brisk carotid upstrokes without carotid bruit. Skin: Skin color, texture, turgor are normal with no rashes or ulceration. Pysch: Euthymic mood, appropriate affect   Neurologic: Oriented to person, place and time. No slurred speech or facial asymmetry. No motor or sensory deficits on gross examination.          Labs:   CBC:   Lab Results   Component Value Date    WBC 11.2 04/15/2021    RBC 4.30 04/15/2021    HGB 12.2 04/15/2021    HCT 38.1 04/15/2021    MCV 88.5 04/15/2021    RDW 18.0 04/15/2021     04/15/2021     CMP:  Lab Results   Component Value Date     04/15/2021    K 4.4 04/15/2021    K 4.9 04/10/2021     04/15/2021    CO2 30 04/15/2021    BUN 13 04/15/2021    CREATININE 0.9 04/15/2021    GFRAA >60 04/15/2021    GFRAA >60 03/16/2011    AGRATIO 1.3 04/08/2021    LABGLOM >60 04/15/2021    GLUCOSE 109 04/15/2021    PROT 6.3 04/09/2021    PROT 6.9 03/16/2011    CALCIUM 9.2 04/15/2021    BILITOT 0.5 04/09/2021    ALKPHOS 92 04/09/2021    AST 13 04/09/2021    ALT 16 04/09/2021     PT/INR:  No results found for: PTINR  HgBA1c:  Lab Results   Component Value Date    LABA1C 6.0 09/25/2020     Lab Results   Component Value Date    TROPONINI <0.01 04/15/2021         Interval Testing/Data:     CT abdomen/pelvis 4/15/21  1. Approximately 30% stenosis in the right external iliac artery. 2. Multilevel atherosclerotic disease of the SFA is bilaterally with patent   flow to the popliteal artery. 3. Limited evaluation of the distal lower extremities, likely secondary to   contrast bolus timing. 4. Near resolution of the pericolonic inflammation since the prior CT. 5. Radiopaque screw/nail seen just the left navicular bone is noted and   mostly projects within the soft tissues of the foot.  Correlate with recent   prior foot surgery and prior positioning of screw, if imaging is available.             [facility] 4/14/21  LVGRAM     LVEDP  6   GRADIENT ACROSS AORTIC VALVE  none   LV FUNCTION EF 60%   WALL MOTION  normal   MITRAL REGURGITATION  mild         CORONARY ARTERIES     LM Proximal less than 10% stenosis, mid-distal haziness with calcification and 70% eccentric stenosis.       LAD  Proximal-mid diffuse tubular 80% stenosis, there is mid-distal bidirectional flow noted. Distal vessel is visualized on LIMA angiogram, this shows 60% distal stenosis.     D1 is a large vessel with tortuosity noted and ostial/proximal 70% stenosis followed by mid vessel calcification with 50% stenosis and mid-distal 99% stenosis.          LCX Medium to large size vessel, proximal 30 to 40% stenosis.     OM 3 appears to be the largest OM and is 100% proximal-mid , distal vessel is seen through left to left collaterals and appears to have less than 10% stenosis.       RCA Dominant, tortuous, proximal-mid 60 to 70% stenosis.     There are moderate right to left collaterals to the circumflex.            BYPASS GRAFTS     LIMA-LAD Widely patent with less than 10% [id number] stenosis, the distal LAD has stenosis as noted above.         SVG-D1  100% proximal/mid-distal          SVG-circumflex  100% [id number]             Findings and plans as noted below were discussed with the patient and family, they understand/agree      4/13/21 Lexiscan  Summary    Large area, reversible, moderate to severe intensity perfusion defect    involving the basal to apical anterolateral, mid to apical anterior and    apex. Mixed ischemia/scar of the inferolateral territory. Decreased wall    motion and myocardial thickening of the above segments. Post-stress LVEF    visually estimated 45-50%. Overall findings represent a high risk scan. Limited echo 3/12/21  Conclusions    Summary   Limited echo for left ventricle systolic function. Definity is used for   myocardial border enhancement. LV systolic function is mildly reduced with a visually estimated EF=45-50%. The left ventricle is normal in size with normal wall thickness. More prominent Inferior HK on certain views. Indeterminate diastolic function.     Limited ECHO 9/29/20   Summary   Limited only f/u for LVEF post op CABG   Technically difficult examination.   Normal left ventricular systolic function with ejection fraction of 55-60%.   No regional wall motion abnormalites are seen.   Compared to previous study from 8- no changes noted in left   ventricular function.     Lexiscan 9/22/20   Summary     Increase left ventricular systolic volume with hypokinesis/decreased     myocardial thickening of the inferolateral segment.  Large area of inducible     ischemia of the LAD and left circumflex euvolemic  4. Mobitz I with occasional 2:1 block; evaluated by EP  5. Diverticulitis -treatment ongoing, improving by imaging  6. Lisinopril intolerance due to cough         Patient Active Problem List   Diagnosis    Crush injury of right foot    Shortness of breath    Chest pain    Erectile dysfunction    Hyperglycemia    Anxiety    Depression    Tobacco abuse    History of alcohol abuse    Fatigue    OANH (obstructive sleep apnea)    Hypersomnia    Chronic obstructive pulmonary disease (HCC)    Lumbar radiculopathy    HNP (herniated nucleus pulposus), lumbar    Spondylosis without myelopathy or radiculopathy, lumbar region    DDD (degenerative disc disease), lumbar    Lumbar spine pain    Acute respiratory distress    Class 3 severe obesity with body mass index (BMI) of 45.0 to 49.9 in adult ([address]    Pneumonia, primary atypical    Acute respiratory failure with hypoxia ([address]    Moderate protein-calorie malnutrition (HCC)    Acute respiratory failure (HCC)    Acute on chronic respiratory failure with hypoxemia (HCC)    Acute diastolic congestive heart failure (HCC)    Hyperlipidemia    Insomnia    Morbid obesity with BMI of 45.0-49.9, adult (HCC)    Abnormal cardiovascular stress test    Coronary artery disease involving native heart with angina pectoris ([address]    S/P three vessel coronary artery bypass    Acute kidney injury ([address]    Acute diverticulitis    Chronic systolic congestive heart failure (HCC)    Mobitz type I Wenckebach atrioventricular block    Asystole (HCC)    Ischemic cardiomyopathy    Diverticulitis of colon    CAD in native artery       PLAN:  1. Continues dual antiplatelet therapy, heparin drip, beta-blocker, statin and nitro drip. He has no anginal pain at present. 2.  CT with runoffs reviewed. Will review with interventional/Dr. Steven Bermudez. 3.  Monitor rhythms, intermittent Wenkebach. Evaluated by EP this admission with no high-grade AV block.   Plan for cardiac monitor discharge and to continue metoprolol throughout. Plan for complex PCI +/- Impella with Dr. Rory Kay on Monday. N.p.o. Sunday night. I will address the patient's cardiac risk factors and adjusted pharmacologic treatment as needed. In addition, I have reinforced the need for patient directed risk factor modification. All questions and concerns were addressed to the patient/family. Alternatives to my treatment were discussed. Thank you for allowing us to participate in the care of Reanna Asif. Please call me with any questions [phone].     Génesis Dawkins MD, [facility]  Cardiovascular Disease  [facility]  [phone] [facility]  [phone] [address] [place] [address]  4/16/2021 3:09 PM

## 2021-04-16 NOTE — PROGRESS NOTES
Hospitalist Progress Note      PCP: ANICETO Mosquera - CNP    Date of Admission: 4/14/2021    Chief Complaint: Chest pain    Hospital Course: Reviewed H&P    Subjective: Patient currently on nitro gtt. and heparin gtt. Underwent LHC on 4/15/2021 which showed 2/3 bypass grafts occlusion. Not a CT surgical candidate per CT surgery. Cardiology plan on high risk PCI +/-Impella on Monday, 4/19/2021. Patient currently denies any chest pain. Discussed care plan in detail with patient and he verbalized understanding. Offers no new complaints at this time.     Medications:  Reviewed    Infusion Medications    heparin (PORCINE) Infusion 1,520 Units/hr (04/16/21 1002)    nitroGLYCERIN 5 mcg/min (04/15/21 1455)    sodium chloride      sodium chloride       Scheduled Medications    melatonin  5 mg Oral Nightly    sodium chloride flush  5-40 mL Intravenous 2 times per day    sodium chloride flush  5-40 mL Intravenous 2 times per day    aspirin  81 mg Oral Daily    atorvastatin  80 mg Oral Nightly    cefTRIAXone (ROCEPHIN) IV  1,000 mg Intravenous Q24H    clopidogrel  75 mg Oral Daily    metoprolol tartrate  25 mg Oral BID    metroNIDAZOLE  500 mg Intravenous Q8H    sertraline  100 mg Oral Daily    traZODone  100 mg Oral Nightly     PRN Meds: nitroGLYCERIN, heparin (porcine), heparin (porcine), sodium chloride flush, sodium chloride, sodium chloride, sodium chloride flush, acetaminophen **OR** acetaminophen, polyethylene glycol, promethazine **OR** ondansetron, albuterol, ipratropium, morphine, acetaminophen      Intake/Output Summary (Last 24 hours) at 4/16/2021 1415  Last data filed at 4/16/2021 1002  Gross per 24 hour   Intake 1113 ml   Output 575 ml   Net 538 ml       Physical Exam Performed:  /74   Pulse 62   Temp 98.2 °F (36.8 °C) (Oral)   Resp 20   Ht 5' 4\" (1.626 m)   Wt 285 lb 9.6 oz (129.5 kg)   SpO2 94%   BMI 49.02 kg/m²     General appearance: Obese  male in no apparent distress, appears stated age and cooperative. HEENT: Pupils equal, round, and reactive to light. Conjunctivae/corneas clear. Neck: Supple, with full range of motion. No jugular venous distention. Trachea midline. Respiratory:  Normal respiratory effort. Clear to auscultation, bilaterally without Rales/Wheezes/Rhonchi. Cardiovascular: Regular rate and rhythm with normal S1/S2 without murmurs, rubs or gallops. Abdomen: Soft, non-tender, non-distended with normal bowel sounds. Musculoskeletal: No clubbing, cyanosis or edema bilaterally. Full range of motion without deformity. Skin: Skin color, texture, turgor normal.  No rashes or lesions. Tattoos noted  Neurologic:  Neurovascularly intact without any focal sensory/motor deficits. Cranial nerves: II-XII intact, grossly non-focal.  Psychiatric: Alert and oriented, thought content appropriate, normal insight  Capillary Refill: Brisk,< 3 seconds   Peripheral Pulses: +2 palpable, equal bilaterally       Labs:   Recent Labs     04/15/21  0529 04/15/21  1053   WBC 11.6* 11.2*   HGB 12.8* 12.2*   HCT 38.9* 38.1*    187     Recent Labs     04/15/21  0529      K 4.4      CO2 30   BUN 13   CREATININE 0.9   CALCIUM 9.2       Recent Labs     04/15/21  1035   TROPONINI <0.01       Urinalysis:    Lab Results   Component Value Date    NITRU Negative 04/08/2021    BLOODU Negative 04/08/2021    SPECGRAV 1.020 04/08/2021    GLUCOSEU Negative 04/08/2021       Radiology:  CTA ABDOMINAL AORTA W BILAT RUNOFF W CONTRAST   Final Result   1. Approximately 30% stenosis in the right external iliac artery. 2. Multilevel atherosclerotic disease of the SFA is bilaterally with patent   flow to the popliteal artery. 3. Limited evaluation of the distal lower extremities, likely secondary to   contrast bolus timing. 4. Near resolution of the pericolonic inflammation since the prior CT.    5. Radiopaque screw/nail seen just the left navicular bone is noted and mostly projects within the soft tissues of the foot. Correlate with recent   prior foot surgery and prior positioning of screw, if imaging is available. Active Hospital Problems    Diagnosis Date Noted    CAD in native artery [I25.10] 04/14/2021     Assessment/Plan:  Severe multivessel disease: CAD  CT surgery consulted -evaluated and stated not a surgical candidate   Continue aspirin;  Plavix and high intensity statin  Cardiology plan on complex PCI +/- Impella with Dr. Mahoney File on Monday 4/19/2021.  -Currently on heparin and nitro GTT. Bradycardia POA: Per EP  -On Lopressor 25 mg twice daily at home -->transitioned  to metoprolol succinate and GDMT per cardiology. -Recommended 4 week monitor at time of discharge. - Follow up as outpatient to review dizziness and monitor results.     Acute uncomplicated diverticulitis POA :  Continue Rocephin and Flagyl for 10 days currently on day 7     Ischemic cardiomyopathy:  Held Lasix lisinopril for now  Metoprolol as recommended by EP  Cardiology consulted for recommendations.     OANH: Never really had a sleep study done as recommended. Class III obesity:Complicating assessment and treatment. Placing patient at risk for multiple co-morbidities as well as early death and contributing to the patient's presentation. Education, and counseling  COPD: Not in acute exacerbation, nebs    DVT Prophylaxis: IV heparin gtt   Diet: DIET CARDIAC;  Code Status: Full Code    PT/OT Eval Status: Ambulatory     Dispo -likely to stay over the weekend pending complex PCI scheduled for Monday, 4/19/2021. The note was completed using Dragon -speech recognition software & EMR  . Every effort was made to ensure accuracy; however, inadvertent computerized transcription errors may be present.     Nadege Murdock MD

## 2021-04-16 NOTE — PROGRESS NOTES
Upon arrival to place PICC line assessed chart for issues related to picc placement, check for consent, and did time out with RN Laura Jacob. Pt. Tolerated PICC placement well, no difficulty accessing basilic vein and 3CG technology used to verify PICC tip placement. Positive P wave with no negative deflection. Printed wave form and placed In chart.  Reported off to [employer]

## 2021-04-16 NOTE — FLOWSHEET NOTE
04/16/21 1954   Assessment   Charting Type Shift assessment   Neurological   Neuro (WDL) WDL   Level of Consciousness Alert (0)   Orientation Level Oriented X4   Cognition Appropriate judgement; Appropriate safety awareness; Appropriate attention/concentration; Appropriate for developmental age   [de-identified] Screening   Is the patient able to remain alert for testing? Yes   Was the Patient Eating a Modified Diet Prior to being Admitted? No   Is there an Existing PEG or Abdominal Feeding Tube? No   Does the patient have a Head of Bed [facility] restriction <30°? No   Is there a Tracheostomy Tube Present? No   3 oz Water Swallow Screen Pass   [place] Coma Scale   Eye Opening 4   Best Verbal Response 5   Best Motor Response 6   [place] Coma Scale Score 15   HEENT   Right Eye Intact; Impaired vision   Left Eye Intact; Impaired vision   Mucous Membrane Moist;Pink; Intact   Teeth Missing teeth   Respiratory   Respiratory Pattern Regular   Respiratory Depth Normal   Respiratory Quality/Effort Unlabored;Dyspnea with exertion   Chest Assessment Chest expansion symmetrical   Breath Sounds   Right Upper Lobe Clear   Right Middle Lobe Diminished   Right Lower Lobe Diminished   Left Upper Lobe Clear   Left Lower Lobe Diminished   Cardiac   Cardiac Regularity Regular   Heart Sounds S1, S2   Cardiac Rhythm NSR  (on tele moniotr)   Cardiac Monitor   Telemetry Monitor On Yes   Telemetry Audible Yes   Telemetry Alarms Set Yes   Gastrointestinal   RUQ Bowel Sounds Active   LUQ Bowel Sounds Active   RLQ Bowel Sounds Active   LLQ Bowel Sounds Active   Abdomen Inspection Rounded; Soft   Tenderness Soft   Passing Flatus Y   Peripheral Vascular   RLE Edema Trace   LLE Edema Trace   RUE Neurovascular Assessment   R Radial Pulse +2   Puncture Site Assessment 1   Location Radial - right   Site Assessment No redness, drainage, swelling or hematoma   Skin Color/Condition   Skin Color/Condition (WDL) WDL   Skin Integrity   Skin Integrity (WDL) WDL   Skin Integrity   (multiple scattered tattoos)   Musculoskeletal   RUE Full movement   LUE Full movement   RL Extremity Full movement   LL Extremity Full movement   Genitourinary   Genitourinary (WDL) WDL   Flank Tenderness No   Suprapubic Tenderness No   Dysuria SAMMIE   Anus/Rectum   Anus/Rectum (WDL) WDL   Psychosocial   Psychosocial (WDL) WDL   Pt in bed, alert and oriented, shift assessment done, updated with POC, Pt verbalized understanding. No concerns voiced. Safety/fall prevention maintained. Personal belongings and call light within reach. Will continue to monitor.  MKRN

## 2021-04-16 NOTE — PROGRESS NOTES
Patient alert and oriented x 4. VSS. Patient has had one complaint of pain that was managed by morphine. Patient currently on a heparin drip at 15.2 ml/hr. Patient currently on continuous nitroglycerin drip. Pt tolerating diet  Call light in reach. Will continue to monitor.  Electronically signed by Naty Calvillo RN on 4/16/2021 at 11:01 AM

## 2021-04-17 PROBLEM — I25.110 CORONARY ARTERY DISEASE INVOLVING NATIVE CORONARY ARTERY OF NATIVE HEART WITH UNSTABLE ANGINA PECTORIS (HCC): Status: ACTIVE | Noted: 2021-04-14

## 2021-04-17 LAB
ANION GAP SERPL CALCULATED.3IONS-SCNC: 8 MMOL/L (ref 3–16)
APTT: 50.5 SEC (ref 24.2–36.2)
APTT: 67.7 SEC (ref 24.2–36.2)
APTT: 80.1 SEC (ref 24.2–36.2)
BUN BLDV-MCNC: 9 MG/DL (ref 7–20)
CALCIUM SERPL-MCNC: 9 MG/DL (ref 8.3–10.6)
CHLORIDE BLD-SCNC: 104 MMOL/L (ref 99–110)
CO2: 28 MMOL/L (ref 21–32)
CREAT SERPL-MCNC: 0.9 MG/DL (ref 0.9–1.3)
GFR AFRICAN AMERICAN: >60
GFR NON-AFRICAN AMERICAN: >60
GLUCOSE BLD-MCNC: 113 MG/DL (ref 70–99)
HCT VFR BLD CALC: 36.7 % (ref 40.5–52.5)
HEMOGLOBIN: 12.2 G/DL (ref 13.5–17.5)
MCH RBC QN AUTO: 29.4 PG (ref 26–34)
MCHC RBC AUTO-ENTMCNC: 33.3 G/DL (ref 31–36)
MCV RBC AUTO: 88.4 FL (ref 80–100)
PDW BLD-RTO: 18.1 % (ref 12.4–15.4)
PLATELET # BLD: 180 K/UL (ref 135–450)
PMV BLD AUTO: 8.1 FL (ref 5–10.5)
POTASSIUM REFLEX MAGNESIUM: 4.1 MMOL/L (ref 3.5–5.1)
RBC # BLD: 4.15 M/UL (ref 4.2–5.9)
SODIUM BLD-SCNC: 140 MMOL/L (ref 136–145)
WBC # BLD: 10.2 K/UL (ref 4–11)

## 2021-04-17 PROCEDURE — 2580000003 HC RX 258: Performed by: INTERNAL MEDICINE

## 2021-04-17 PROCEDURE — 6370000000 HC RX 637 (ALT 250 FOR IP): Performed by: INTERNAL MEDICINE

## 2021-04-17 PROCEDURE — 85027 COMPLETE CBC AUTOMATED: CPT

## 2021-04-17 PROCEDURE — 2580000003 HC RX 258: Performed by: NURSE PRACTITIONER

## 2021-04-17 PROCEDURE — 6370000000 HC RX 637 (ALT 250 FOR IP): Performed by: NURSE PRACTITIONER

## 2021-04-17 PROCEDURE — 94761 N-INVAS EAR/PLS OXIMETRY MLT: CPT

## 2021-04-17 PROCEDURE — 36592 COLLECT BLOOD FROM PICC: CPT

## 2021-04-17 PROCEDURE — 85730 THROMBOPLASTIN TIME PARTIAL: CPT

## 2021-04-17 PROCEDURE — 2700000000 HC OXYGEN THERAPY PER DAY

## 2021-04-17 PROCEDURE — 99233 SBSQ HOSP IP/OBS HIGH 50: CPT | Performed by: NURSE PRACTITIONER

## 2021-04-17 PROCEDURE — 80048 BASIC METABOLIC PNL TOTAL CA: CPT

## 2021-04-17 PROCEDURE — 6360000002 HC RX W HCPCS: Performed by: INTERNAL MEDICINE

## 2021-04-17 PROCEDURE — 2060000000 HC ICU INTERMEDIATE R&B

## 2021-04-17 PROCEDURE — 2500000003 HC RX 250 WO HCPCS: Performed by: INTERNAL MEDICINE

## 2021-04-17 RX ORDER — CYCLOBENZAPRINE HCL 10 MG
10 TABLET ORAL 3 TIMES DAILY PRN
Status: DISCONTINUED | OUTPATIENT
Start: 2021-04-17 | End: 2021-04-20 | Stop reason: HOSPADM

## 2021-04-17 RX ORDER — IBUPROFEN 600 MG/1
600 TABLET ORAL
Status: COMPLETED | OUTPATIENT
Start: 2021-04-17 | End: 2021-04-18

## 2021-04-17 RX ADMIN — CYCLOBENZAPRINE HYDROCHLORIDE 10 MG: 10 TABLET, FILM COATED ORAL at 13:08

## 2021-04-17 RX ADMIN — METOPROLOL TARTRATE 25 MG: 25 TABLET, FILM COATED ORAL at 08:28

## 2021-04-17 RX ADMIN — Medication 5 MG: at 20:37

## 2021-04-17 RX ADMIN — IBUPROFEN 600 MG: 600 TABLET, FILM COATED ORAL at 18:01

## 2021-04-17 RX ADMIN — HEPARIN SODIUM 1520 UNITS/HR: 10000 INJECTION, SOLUTION INTRAVENOUS at 04:29

## 2021-04-17 RX ADMIN — SERTRALINE 100 MG: 50 TABLET, FILM COATED ORAL at 08:28

## 2021-04-17 RX ADMIN — MORPHINE SULFATE 2 MG: 4 INJECTION, SOLUTION INTRAMUSCULAR; INTRAVENOUS at 11:36

## 2021-04-17 RX ADMIN — TRAZODONE HYDROCHLORIDE 100 MG: 50 TABLET ORAL at 20:37

## 2021-04-17 RX ADMIN — ASPIRIN 81 MG: 81 TABLET, COATED ORAL at 08:28

## 2021-04-17 RX ADMIN — ATORVASTATIN CALCIUM 80 MG: 80 TABLET, FILM COATED ORAL at 20:37

## 2021-04-17 RX ADMIN — METRONIDAZOLE 500 MG: 500 INJECTION, SOLUTION INTRAVENOUS at 00:19

## 2021-04-17 RX ADMIN — CLOPIDOGREL BISULFATE 75 MG: 75 TABLET ORAL at 08:28

## 2021-04-17 RX ADMIN — CEFTRIAXONE SODIUM 1000 MG: 1 INJECTION, POWDER, FOR SOLUTION INTRAMUSCULAR; INTRAVENOUS at 19:56

## 2021-04-17 RX ADMIN — Medication 10 ML: at 19:58

## 2021-04-17 RX ADMIN — MORPHINE SULFATE 2 MG: 4 INJECTION, SOLUTION INTRAMUSCULAR; INTRAVENOUS at 03:07

## 2021-04-17 RX ADMIN — METRONIDAZOLE 500 MG: 500 INJECTION, SOLUTION INTRAVENOUS at 16:19

## 2021-04-17 RX ADMIN — SODIUM CHLORIDE, PRESERVATIVE FREE 10 ML: 5 INJECTION INTRAVENOUS at 20:40

## 2021-04-17 RX ADMIN — MORPHINE SULFATE 2 MG: 4 INJECTION, SOLUTION INTRAMUSCULAR; INTRAVENOUS at 06:39

## 2021-04-17 RX ADMIN — METOPROLOL TARTRATE 25 MG: 25 TABLET, FILM COATED ORAL at 20:37

## 2021-04-17 RX ADMIN — IBUPROFEN 600 MG: 600 TABLET, FILM COATED ORAL at 13:08

## 2021-04-17 RX ADMIN — Medication 10 ML: at 08:27

## 2021-04-17 RX ADMIN — HEPARIN SODIUM 1520 UNITS/HR: 10000 INJECTION, SOLUTION INTRAVENOUS at 19:27

## 2021-04-17 RX ADMIN — METRONIDAZOLE 500 MG: 500 INJECTION, SOLUTION INTRAVENOUS at 08:27

## 2021-04-17 ASSESSMENT — PAIN SCALES - GENERAL
PAINLEVEL_OUTOF10: 10
PAINLEVEL_OUTOF10: 5
PAINLEVEL_OUTOF10: 0

## 2021-04-17 ASSESSMENT — PAIN DESCRIPTION - LOCATION
LOCATION: CHEST
LOCATION: CHEST

## 2021-04-17 ASSESSMENT — PAIN DESCRIPTION - PAIN TYPE: TYPE: ACUTE PAIN

## 2021-04-17 ASSESSMENT — PAIN DESCRIPTION - DESCRIPTORS: DESCRIPTORS: ACHING;DISCOMFORT;SORE

## 2021-04-17 ASSESSMENT — PAIN DESCRIPTION - ORIENTATION: ORIENTATION: MID

## 2021-04-17 NOTE — PROGRESS NOTES
Hospitalist Progress Note      PCP: ANICETO Jiménez CNP    Date of Admission: 4/14/2021    Chief Complaint: Chest pain    Hospital Course: Reviewed H&P    Subjective: Patient currently on nitro gtt. and heparin gtt. Underwent LHC on 4/15/2021 which showed 2/3 bypass grafts occlusion. Not a CT surgical candidate per CT surgery. Cardiology plan on high risk PCI +/-Impella on Monday, 4/19/2021. Patient currently denies any chest pain. Discussed care plan in detail with patient and he verbalized understanding. Offers no new complaints at this time.     Medications:  Reviewed    Infusion Medications    heparin (PORCINE) Infusion 1,520 Units/hr (04/17/21 3910)    nitroGLYCERIN 5 mcg/min (04/15/21 7500)    sodium chloride      sodium chloride       Scheduled Medications    ibuprofen  600 mg Oral TID WC    melatonin  5 mg Oral Nightly    sodium chloride flush  5-40 mL Intravenous 2 times per day    sodium chloride flush  5-40 mL Intravenous 2 times per day    aspirin  81 mg Oral Daily    atorvastatin  80 mg Oral Nightly    cefTRIAXone (ROCEPHIN) IV  1,000 mg Intravenous Q24H    clopidogrel  75 mg Oral Daily    metoprolol tartrate  25 mg Oral BID    metroNIDAZOLE  500 mg Intravenous Q8H    sertraline  100 mg Oral Daily    traZODone  100 mg Oral Nightly     PRN Meds: cyclobenzaprine, nitroGLYCERIN, heparin (porcine), heparin (porcine), sodium chloride flush, sodium chloride, sodium chloride, sodium chloride flush, acetaminophen **OR** acetaminophen, polyethylene glycol, promethazine **OR** ondansetron, albuterol, ipratropium, morphine, acetaminophen      Intake/Output Summary (Last 24 hours) at 4/17/2021 1428  Last data filed at 4/17/2021 1258  Gross per 24 hour   Intake 945.83 ml   Output 800 ml   Net 145.83 ml       Physical Exam Performed:  /75   Pulse 71   Temp 97.6 °F (36.4 °C) (Oral)   Resp 19   Ht 5' 4\" (1.626 m)   Wt 284 lb 9.6 oz (129.1 kg)   SpO2 96%   BMI 48.85 kg/m²     General appearance: Obese  male in no apparent distress, appears stated age and cooperative. HEENT: Pupils equal, round, and reactive to light. Conjunctivae/corneas clear. Neck: Supple, with full range of motion. No jugular venous distention. Trachea midline. Respiratory:  Normal respiratory effort. Clear to auscultation, bilaterally without Rales/Wheezes/Rhonchi. Cardiovascular: Regular rate and rhythm with normal S1/S2 without murmurs, rubs or gallops. Abdomen: Soft, non-tender, non-distended with normal bowel sounds. Musculoskeletal: No clubbing, cyanosis or edema bilaterally. Full range of motion without deformity. Skin: Skin color, texture, turgor normal.  No rashes or lesions. Tattoos noted  Neurologic:  Neurovascularly intact without any focal sensory/motor deficits. Cranial nerves: II-XII intact, grossly non-focal.  Psychiatric: Alert and oriented, thought content appropriate, normal insight  Capillary Refill: Brisk,< 3 seconds   Peripheral Pulses: +2 palpable, equal bilaterally       Labs:   Recent Labs     04/15/21  0529 04/15/21  1053 04/17/21  0525   WBC 11.6* 11.2* 10.2   HGB 12.8* 12.2* 12.2*   HCT 38.9* 38.1* 36.7*    187 180     Recent Labs     04/15/21  0529 04/17/21  0525    140   K 4.4 4.1    104   CO2 30 28   BUN 13 9   CREATININE 0.9 0.9   CALCIUM 9.2 9.0       Recent Labs     04/15/21  1035   TROPONINI <0.01       Urinalysis:    Lab Results   Component Value Date    NITRU Negative 04/08/2021    BLOODU Negative 04/08/2021    SPECGRAV 1.020 04/08/2021    GLUCOSEU Negative 04/08/2021       Radiology:  CTA ABDOMINAL AORTA W BILAT RUNOFF W CONTRAST   Final Result   1. Approximately 30% stenosis in the right external iliac artery. 2. Multilevel atherosclerotic disease of the SFA is bilaterally with patent   flow to the popliteal artery. 3. Limited evaluation of the distal lower extremities, likely secondary to   contrast bolus timing.    4. Near Ambulatory     Dispo -likely to stay over the weekend pending complex PCI scheduled for Monday, 4/19/2021. The note was completed using Dragon -speech recognition software & EMR  . Every effort was made to ensure accuracy; however, inadvertent computerized transcription errors may be present.     Mariposa Santacruz MD

## 2021-04-17 NOTE — PROGRESS NOTES
1. Unstable angina: Continues with chest pain but is more pleuritic from persistent cough, chest is tender to palpation, on IV nitro, metoprolol, DAPT  2. CAD: Status post CABG September 2020, recurrent angina as above  3. Ischemic cardiomyopathy: EF 45% by echo March 2021, on beta-blocker  4. CHF, chronic combined: Euvolemia on exam  5. Second-degree AV block, [place] 1: With bradycardia  6. OANH: Untreated, likely cause of sinus arrhythmia and pauses at night, also likely contributing to his fatigue  7. HLD: On statin      PLAN:  1. We will give short course of muscle relaxer and NSAID for musculoskeletal chest pain. 2. Wean IV nitro as able  3. Continue IV heparin until Monday  4. Planning PCI of the LM and diagonal on Monday, 4/19/2021  5. Daily labs  6.  Encourage patient to reconsider treatment for sleep apnea    ANICETO Willis - CNP, 4/17/2021, 10:52 AM  [place] [address]   [phone]       Telemetry: SR/SB with frequent isolated PVCs, significant sinus pause at oh 1:40 AM greater than 5 seconds  NYHA: IV    Physical Exam:  General:  Awake, alert, NAD  Skin:  Warm and dry  Neck:  JVP difficult to assess  Chest: Clear to auscultation  Cardiovascular:  RRR, normal S1S2, no appreciable MRG  Abdomen:  Soft, nontender, +bowel sounds  Extremities: No BLE edema  right radial site without ooze, bruise or hematoma, dressing C,D,I, 2+ pulse      Medications:    melatonin  5 mg Oral Nightly    sodium chloride flush  5-40 mL Intravenous 2 times per day    sodium chloride flush  5-40 mL Intravenous 2 times per day    aspirin  81 mg Oral Daily    atorvastatin  80 mg Oral Nightly    cefTRIAXone (ROCEPHIN) IV  1,000 mg Intravenous Q24H    clopidogrel  75 mg Oral Daily    metoprolol tartrate  25 mg Oral BID    metroNIDAZOLE  500 mg Intravenous Q8H    sertraline  100 mg Oral Daily    traZODone  100 mg Oral Nightly      heparin (PORCINE) Infusion 1,520 Units/hr (04/17/21 8985)    nitroGLYCERIN 5 mcg/min (04/15/21 1455)    sodium chloride      sodium chloride         Lab Data: Lab results independently reviewed and analyzed by myself 4/17/21   CBC:   Recent Labs     04/15/21  0529 04/15/21  1053 04/17/21  0525   WBC 11.6* 11.2* 10.2   HGB 12.8* 12.2* 12.2*    187 180     BMP:    Recent Labs     04/15/21  0529 04/17/21  0525    140   K 4.4 4.1   CO2 30 28   BUN 13 9   CREATININE 0.9 0.9     INR:  No results for input(s): INR in the last 72 hours. BNP:  No results for input(s): PROBNP in the last 72 hours. Cardiac Enzymes:   Recent Labs     04/15/21  1035   TROPONINI <0.01     Lipids:   Lab Results   Component Value Date    TRIG 173 09/25/2020    TRIG 217 09/19/2020    HDL 35 01/13/2021    HDL 34 09/25/2020    LDLCALC 33 01/13/2021    LDLCALC 16 09/25/2020       Cardiac Imaging:    [facility] 4/14/21  LVGRAM   LVEDP  6   GRADIENT ACROSS AORTIC VALVE  none   LV FUNCTION EF 60%   WALL MOTION  normal   MITRAL REGURGITATION  mild     CORONARY ARTERIES   LM Proximal less than 10% stenosis, mid-distal haziness with calcification and 70% eccentric stenosis. LAD  Proximal-mid diffuse tubular 80% stenosis, there is mid-distal bidirectional flow noted. Distal vessel is visualized on LIMA angiogram, this shows 60% distal stenosis. D1 is a large vessel with tortuosity noted and ostial/proximal 70% stenosis followed by mid vessel calcification with 50% stenosis and mid-distal 99% stenosis. LCX Medium to large size vessel, proximal 30 to 40% stenosis. OM 3 appears to be the largest OM and is 100% proximal-mid , distal vessel is seen through left to left collaterals and appears to have less than 10% stenosis. RCA Dominant, tortuous, proximal-mid 60 to 70% stenosis. There are moderate right to left collaterals to the circumflex. BYPASS GRAFTS   LIMA-LAD Widely patent with less than 10% [id number] stenosis, the distal LAD has stenosis as noted above.          SVG-D1 100% proximal/mid-distal          SVG-circumflex  100% [id number]                  4/13/21 Lexiscan  Summary    Large area, reversible, moderate to severe intensity perfusion defect    involving the basal to apical anterolateral, mid to apical anterior and    apex. Mixed ischemia/scar of the inferolateral territory. Decreased wall    motion and myocardial thickening of the above segments. Post-stress LVEF    visually estimated 45-50%. Overall findings represent a high risk scan. Limited echo 3/12/21  Conclusions    Summary   Limited echo for left ventricle systolic function. Definity is used for myocardial border enhancement. LV systolic function is mildly reduced with a visually estimated EF=45-50%. The left ventricle is normal in size with normal wall thickness. More prominent Inferior HK on certain views. Indeterminate diastolic function. LIMITED ECHO 9/29/20:   Summary   Limited only f/u for LVEF post op CABG   Technically difficult examination. Normal left ventricular systolic function with ejection fraction of 55-60%. No regional wall motion abnormalites are seen. Compared to previous study from 8- no changes noted in left  ventricular function. 9/25/20 Urgent coronary artery bypass grafting surgery x3 with a single greater saphenous vein graft to the obtuse marginal branch of the circumflex, separate single greater saphenous vein graft to the first diagonal branch of the LAD, pedicled left internal artery to the LAD. [place]-Augusto catheter placement. Cardiopulmonary bypass. Endoscopic vein harvesting of the right greater saphenous vein. Transesophageal echo. Epiaortic ultrasound. Doppler verification of grafts. Bilateral five-level intercostal nerve block with Exparel. Platelet gel application       CARDIAC CATH 9/22/20:   Procedures Performed:   1. Left heart catheterization  2. Selective left and right coronary angiogram  3.  Left ventriculography    Procedure Findings:  1. Severe multi vessel coronary artery diease  2. Normal left ventricular function with EF estimated at 55-60%  3. Normal left heart hemodynamics  Findings:   1. Left main coronary artery has moderate calcific disease. It gave off the left anterior descending artery and left circumflex. 2. Left anterior descending artery has severe atherosclerotic disease. It was moderate in size. It gave off a large early diagonal branch that has ostial 90%. The LAD covered the entire apex of the left ventricle. 3. Left circumflex has severe atherosclerotic disease. It was moderate in size. There was a moderate sized obtuse marginal branch that is occluded. 4. Right coronary artery has mild atherosclerotic disease. It was large in size and was the dominant artery. ~there is faint collaterals to the LAD   5. Left ventriculogram showed normal LVEF at 55-60%. Wall motion was normal . There was no significant mitral valve or aortic valve disease noted. LVEDP was normal. There was no gradient noted across the aortic valve during pullback of the catheter. CONCLUSIONS:   1. Severe multi-vessel coronary artery disease   ASSESSMENT/RECOMMENDATIONS:   1. Referral for CABG      STRESS MPI 9/21/20:     Conclusions  Summary  Increase left ventricular systolic volume with hypokinesis/decreased  myocardial thickening of the inferolateral segment. Large area of inducible  ischemia of the LAD and left circumflex territories. Post-stress LVEF normal  at 56%. Overall findings represent highly abnormal study, high risk scan. ECHO 8/19/20:   Summary   LV systolic function is normal with EF estimated at 55-60%. Endocardium not   entirely well visualized but no obvious segmental wall motion abnormalities. There is moderate concentric left ventricular hypertrophy. Normal left ventricular diastolic filling pressure. Mild mitral annular calcification.    Valves are not entirely well visualized but there is no obvious structural   abnormality or significant color flow abnormality noted on doppler. Unable to obtain SPAP due to lack of tricuspid regurgitation. ECHO 7/19/19  Summary   Left ventricular systolic function is normal with ejection fraction   estimated at 55%. No regional wall motion abnormalities. There is mild concentric left ventricular hypertrophy. Grade I diastolic dysfunction with normal left ventricular filling pressure.    Mild mitral regurgitation

## 2021-04-18 LAB
ANION GAP SERPL CALCULATED.3IONS-SCNC: 6 MMOL/L (ref 3–16)
APTT: 56.5 SEC (ref 24.2–36.2)
BUN BLDV-MCNC: 13 MG/DL (ref 7–20)
CALCIUM SERPL-MCNC: 9.4 MG/DL (ref 8.3–10.6)
CHLORIDE BLD-SCNC: 104 MMOL/L (ref 99–110)
CO2: 29 MMOL/L (ref 21–32)
CREAT SERPL-MCNC: 0.9 MG/DL (ref 0.9–1.3)
GFR AFRICAN AMERICAN: >60
GFR NON-AFRICAN AMERICAN: >60
GLUCOSE BLD-MCNC: 105 MG/DL (ref 70–99)
HCT VFR BLD CALC: 39.9 % (ref 40.5–52.5)
HEMOGLOBIN: 13.3 G/DL (ref 13.5–17.5)
MCH RBC QN AUTO: 29.3 PG (ref 26–34)
MCHC RBC AUTO-ENTMCNC: 33.2 G/DL (ref 31–36)
MCV RBC AUTO: 88 FL (ref 80–100)
PDW BLD-RTO: 18.1 % (ref 12.4–15.4)
PLATELET # BLD: 183 K/UL (ref 135–450)
PMV BLD AUTO: 8 FL (ref 5–10.5)
POTASSIUM REFLEX MAGNESIUM: 4.2 MMOL/L (ref 3.5–5.1)
RBC # BLD: 4.53 M/UL (ref 4.2–5.9)
SODIUM BLD-SCNC: 139 MMOL/L (ref 136–145)
WBC # BLD: 10.1 K/UL (ref 4–11)

## 2021-04-18 PROCEDURE — 85027 COMPLETE CBC AUTOMATED: CPT

## 2021-04-18 PROCEDURE — 6370000000 HC RX 637 (ALT 250 FOR IP): Performed by: INTERNAL MEDICINE

## 2021-04-18 PROCEDURE — 6360000002 HC RX W HCPCS: Performed by: INTERNAL MEDICINE

## 2021-04-18 PROCEDURE — 2500000003 HC RX 250 WO HCPCS: Performed by: INTERNAL MEDICINE

## 2021-04-18 PROCEDURE — 2580000003 HC RX 258: Performed by: ANESTHESIOLOGY

## 2021-04-18 PROCEDURE — 99232 SBSQ HOSP IP/OBS MODERATE 35: CPT | Performed by: NURSE PRACTITIONER

## 2021-04-18 PROCEDURE — 2580000003 HC RX 258: Performed by: INTERNAL MEDICINE

## 2021-04-18 PROCEDURE — 80048 BASIC METABOLIC PNL TOTAL CA: CPT

## 2021-04-18 PROCEDURE — 2500000003 HC RX 250 WO HCPCS: Performed by: ANESTHESIOLOGY

## 2021-04-18 PROCEDURE — 2700000000 HC OXYGEN THERAPY PER DAY

## 2021-04-18 PROCEDURE — 2060000000 HC ICU INTERMEDIATE R&B

## 2021-04-18 PROCEDURE — 36592 COLLECT BLOOD FROM PICC: CPT

## 2021-04-18 PROCEDURE — 6370000000 HC RX 637 (ALT 250 FOR IP): Performed by: NURSE PRACTITIONER

## 2021-04-18 PROCEDURE — 94761 N-INVAS EAR/PLS OXIMETRY MLT: CPT

## 2021-04-18 PROCEDURE — 85730 THROMBOPLASTIN TIME PARTIAL: CPT

## 2021-04-18 RX ORDER — SODIUM CHLORIDE 9 MG/ML
25 INJECTION, SOLUTION INTRAVENOUS PRN
Status: DISCONTINUED | OUTPATIENT
Start: 2021-04-18 | End: 2021-04-20 | Stop reason: HOSPADM

## 2021-04-18 RX ORDER — SODIUM CHLORIDE 0.9 % (FLUSH) 0.9 %
10 SYRINGE (ML) INJECTION EVERY 12 HOURS SCHEDULED
Status: DISCONTINUED | OUTPATIENT
Start: 2021-04-18 | End: 2021-04-20

## 2021-04-18 RX ORDER — SODIUM CHLORIDE, SODIUM LACTATE, POTASSIUM CHLORIDE, CALCIUM CHLORIDE 600; 310; 30; 20 MG/100ML; MG/100ML; MG/100ML; MG/100ML
INJECTION, SOLUTION INTRAVENOUS CONTINUOUS
Status: DISCONTINUED | OUTPATIENT
Start: 2021-04-19 | End: 2021-04-19

## 2021-04-18 RX ORDER — SODIUM CHLORIDE 0.9 % (FLUSH) 0.9 %
10 SYRINGE (ML) INJECTION PRN
Status: DISCONTINUED | OUTPATIENT
Start: 2021-04-18 | End: 2021-04-20 | Stop reason: HOSPADM

## 2021-04-18 RX ADMIN — HEPARIN SODIUM 1520 UNITS/HR: 10000 INJECTION, SOLUTION INTRAVENOUS at 13:17

## 2021-04-18 RX ADMIN — FAMOTIDINE 20 MG: 10 INJECTION, SOLUTION INTRAVENOUS at 23:55

## 2021-04-18 RX ADMIN — METRONIDAZOLE 500 MG: 500 INJECTION, SOLUTION INTRAVENOUS at 08:35

## 2021-04-18 RX ADMIN — METRONIDAZOLE 500 MG: 500 INJECTION, SOLUTION INTRAVENOUS at 00:29

## 2021-04-18 RX ADMIN — IBUPROFEN 600 MG: 600 TABLET, FILM COATED ORAL at 08:35

## 2021-04-18 RX ADMIN — METOPROLOL TARTRATE 25 MG: 25 TABLET, FILM COATED ORAL at 20:48

## 2021-04-18 RX ADMIN — METOPROLOL TARTRATE 25 MG: 25 TABLET, FILM COATED ORAL at 08:35

## 2021-04-18 RX ADMIN — Medication 10 ML: at 05:22

## 2021-04-18 RX ADMIN — ASPIRIN 81 MG: 81 TABLET, COATED ORAL at 08:35

## 2021-04-18 RX ADMIN — Medication 10 ML: at 23:55

## 2021-04-18 RX ADMIN — SERTRALINE 100 MG: 50 TABLET, FILM COATED ORAL at 08:35

## 2021-04-18 RX ADMIN — TRAZODONE HYDROCHLORIDE 100 MG: 50 TABLET ORAL at 20:48

## 2021-04-18 RX ADMIN — Medication 5 MG: at 20:48

## 2021-04-18 RX ADMIN — CLOPIDOGREL BISULFATE 75 MG: 75 TABLET ORAL at 08:35

## 2021-04-18 RX ADMIN — CYCLOBENZAPRINE HYDROCHLORIDE 10 MG: 10 TABLET, FILM COATED ORAL at 17:55

## 2021-04-18 RX ADMIN — ATORVASTATIN CALCIUM 80 MG: 80 TABLET, FILM COATED ORAL at 20:48

## 2021-04-18 RX ADMIN — IBUPROFEN 600 MG: 600 TABLET, FILM COATED ORAL at 13:17

## 2021-04-18 RX ADMIN — METRONIDAZOLE 500 MG: 500 INJECTION, SOLUTION INTRAVENOUS at 17:52

## 2021-04-18 RX ADMIN — CEFTRIAXONE SODIUM 1000 MG: 1 INJECTION, POWDER, FOR SOLUTION INTRAMUSCULAR; INTRAVENOUS at 20:48

## 2021-04-18 ASSESSMENT — PAIN SCALES - GENERAL
PAINLEVEL_OUTOF10: 6
PAINLEVEL_OUTOF10: 6

## 2021-04-18 NOTE — PROGRESS NOTES
[facility]   Daily Progress Note    Admit Date:  4/14/2021  HPI:   Patient presented to [facility] with symptoms of chest pain, shortness of breath and progressive fatigue. He was transferred to [facility] for [address] [facility]. This demonstrated patent LIMA graft to the LAD but occluded vein grafts. He had severe mid to distal left main disease, severe disease of the large D1, occluded OM with left to left collaterals. CVTS was consulted for consideration of redo CABG but felt PCI was his best option. Plan is for high risk PCI of the left main and D1. Of note he was evaluated by EP for bradycardia with second-degree AV block which is felt related to his ischemia versus untreated OANH. Subjective:  Mr. Octavio Knox reports feeling much better today after receiving Motrin and muscle relaxant. Was up in the chair. Breathing is stable. No angina. Still with mild cough though improved and some chest tenderness to palpation. Objective:   Patient Vitals for the past 24 hrs:   BP Temp Temp src Pulse Resp SpO2 Weight   04/18/21 0833 105/65 97.8 °F (36.6 °C) Oral 70 18 97 % --   04/18/21 0412 114/72 98.1 °F (36.7 °C) Oral 61 16 96 % 283 lb 6.4 oz (128.5 kg)   04/17/21 2318 (!) 92/57 97.7 °F (36.5 °C) Oral 61 17 94 % --   04/17/21 2037 122/74 -- -- 76 -- -- --   04/17/21 2003 95/61 98.2 °F (36.8 °C) Oral 63 18 92 % --   04/17/21 1600 100/61 98.2 °F (36.8 °C) Oral 60 17 93 % --   04/17/21 1130 129/75 97.6 °F (36.4 °C) Oral 71 19 96 % --       Intake/Output Summary (Last 24 hours) at 4/18/2021 1029  Last data filed at 4/18/2021 0830  Gross per 24 hour   Intake 3199.52 ml   Output 1275 ml   Net 1924.52 ml     Wt Readings from Last 3 Encounters:   04/18/21 283 lb 6.4 oz (128.5 kg)   04/14/21 283 lb 1.6 oz (128.4 kg)   03/03/21 297 lb 9.6 oz (135 kg)         ASSESSMENT:   1. Unstable angina: Chest pain is more musculoskeletal, improved, off IV nitro  2. CAD: S/p CABG September 2020, recurrent angina as above  3.  Ischemic cardiomyopathy: EF 45% by echo March 2021, on beta-blocker  4. CHF, chronic combined: Euvolemia on exam  5. Second-degree AV block, Mobitz 1: With bradycardia, stable overnight  6. OANH: Untreated, likely cause of sinus arrhythmia and pauses at night, also likely contributing to his fatigue  7. HLD: On statin      PLAN:  1. Continue IV heparin until Monday  2. Planning PCI of the LM and diagonal on Monday, 4/19/2021  3. Start IV fluids at midnight  4. Daily labs  5.  Cardiac cath/PCI tomorrow per Dr. Jacob Wiseman, APRN - CNP, 4/18/2021, 10:29 AM  [facility]   [phone]       Telemetry: SR with PVCs 60-80  NYHA: IV    Physical Exam:  General:  Awake, alert, NAD  Skin:  Warm and dry  Neck:  JVP difficult to assess  Chest: Clear to auscultation  Cardiovascular:  RRR, normal S1S2, no appreciable MRG  Abdomen:  Soft, nontender, +bowel sounds  Extremities: No BLE edema  right radial site without ooze, bruise or hematoma, dressing C,D,I, 2+ pulse      Medications:    ibuprofen  600 mg Oral TID WC    melatonin  5 mg Oral Nightly    sodium chloride flush  5-40 mL Intravenous 2 times per day    sodium chloride flush  5-40 mL Intravenous 2 times per day    aspirin  81 mg Oral Daily    atorvastatin  80 mg Oral Nightly    cefTRIAXone (ROCEPHIN) IV  1,000 mg Intravenous Q24H    clopidogrel  75 mg Oral Daily    metoprolol tartrate  25 mg Oral BID    metroNIDAZOLE  500 mg Intravenous Q8H    sertraline  100 mg Oral Daily    traZODone  100 mg Oral Nightly      heparin (PORCINE) Infusion 1,520 Units/hr (04/17/21 1927)    nitroGLYCERIN 5 mcg/min (04/17/21 1446)    sodium chloride      sodium chloride         Lab Data: Lab results independently reviewed and analyzed by myself 4/17/21   CBC:   Recent Labs     04/15/21  1053 04/17/21  0525 04/18/21  0525   WBC 11.2* 10.2 10.1   HGB 12.2* 12.2* 13.3*    180 183     BMP:    Recent Labs     04/17/21  0525 04/18/21  0525    139   K 4.1 4.2   CO2 28 29 BUN 9 13   CREATININE 0.9 0.9     INR:  No results for input(s): INR in the last 72 hours. BNP:  No results for input(s): PROBNP in the last 72 hours. Cardiac Enzymes:   Recent Labs     04/15/21  1035   TROPONINI <0.01     Lipids:   Lab Results   Component Value Date    TRIG 173 09/25/2020    TRIG 217 09/19/2020    HDL 35 01/13/2021    HDL 34 09/25/2020    LDLCALC 33 01/13/2021    LDLCALC 16 09/25/2020       Cardiac Imaging:    [facility] 4/14/21  LVGRAM   LVEDP  6   GRADIENT ACROSS AORTIC VALVE  none   LV FUNCTION EF 60%   WALL MOTION  normal   MITRAL REGURGITATION  mild     CORONARY ARTERIES   LM Proximal less than 10% stenosis, mid-distal haziness with calcification and 70% eccentric stenosis. LAD  Proximal-mid diffuse tubular 80% stenosis, there is mid-distal bidirectional flow noted. Distal vessel is visualized on LIMA angiogram, this shows 60% distal stenosis. D1 is a large vessel with tortuosity noted and ostial/proximal 70% stenosis followed by mid vessel calcification with 50% stenosis and mid-distal 99% stenosis. LCX Medium to large size vessel, proximal 30 to 40% stenosis. OM 3 appears to be the largest OM and is 100% proximal-mid , distal vessel is seen through left to left collaterals and appears to have less than 10% stenosis. RCA Dominant, tortuous, proximal-mid 60 to 70% stenosis. There are moderate right to left collaterals to the circumflex. BYPASS GRAFTS   LIMA-LAD Widely patent with less than 10% [id number]-[id number] stenosis, the distal LAD has stenosis as noted above. SVG-D1  100% proximal/mid-distal          SVG-circumflex  100% [id number]                  4/13/21 Lexiscan  Summary    Large area, reversible, moderate to severe intensity perfusion defect    involving the basal to apical anterolateral, mid to apical anterior and    apex. Mixed ischemia/scar of the inferolateral territory.  Decreased wall    motion and myocardial was moderate in size. It gave off a large early diagonal branch that has ostial 90%. The LAD covered the entire apex of the left ventricle. 3. Left circumflex has severe atherosclerotic disease. It was moderate in size. There was a moderate sized obtuse marginal branch that is occluded. 4. Right coronary artery has mild atherosclerotic disease. It was large in size and was the dominant artery. ~there is faint collaterals to the LAD   5. Left ventriculogram showed normal LVEF at 55-60%. Wall motion was normal . There was no significant mitral valve or aortic valve disease noted. LVEDP was normal. There was no gradient noted across the aortic valve during pullback of the catheter. CONCLUSIONS:   1. Severe multi-vessel coronary artery disease   ASSESSMENT/RECOMMENDATIONS:   1. Referral for CABG      STRESS MPI 9/21/20:     Conclusions  Summary  Increase left ventricular systolic volume with hypokinesis/decreased  myocardial thickening of the inferolateral segment. Large area of inducible  ischemia of the LAD and left circumflex territories. Post-stress LVEF normal  at 56%. Overall findings represent highly abnormal study, high risk scan. ECHO 8/19/20:   Summary   LV systolic function is normal with EF estimated at 55-60%. Endocardium not   entirely well visualized but no obvious segmental wall motion abnormalities. There is moderate concentric left ventricular hypertrophy. Normal left ventricular diastolic filling pressure. Mild mitral annular calcification. Valves are not entirely well visualized but there is no obvious structural   abnormality or significant color flow abnormality noted on doppler. Unable to obtain SPAP due to lack of tricuspid regurgitation. ECHO 7/19/19  Summary   Left ventricular systolic function is normal with ejection fraction   estimated at 55%. No regional wall motion abnormalities. There is mild concentric left ventricular hypertrophy.    Grade I diastolic dysfunction with normal left ventricular filling pressure.    Mild mitral regurgitation

## 2021-04-18 NOTE — PROGRESS NOTES
Pt awake, and pleasant. Denies any discomfort. No c/o voiced. Safety/fall prevention maintained. Personal belongings and call light within reach.  PHONGRN

## 2021-04-18 NOTE — CARE COORDINATION
Chart reviewed day 4. Care managed per cards and IM. Plan for Cardiac cath/PCI tomorrow per Dr. Tim Kuhn. Heparin coverage til then. Patient from home with support of wife. Has O2 unclear of company. CM will follow post procedure course.  Su Broussard RN

## 2021-04-18 NOTE — FLOWSHEET NOTE
04/17/21 2008   Assessment   Charting Type Shift assessment   Neurological   Neuro (WDL) WDL   Level of Consciousness Alert (0)   Orientation Level Oriented X4   Cognition Appropriate judgement; Appropriate safety awareness; Appropriate attention/concentration; Appropriate for developmental age   [de-identified] Screening   Is the patient able to remain alert for testing? Yes   Was the Patient Eating a Modified Diet Prior to being Admitted? No   Is there an Existing PEG or Abdominal Feeding Tube? No   Does the patient have a Head of Bed [facility] restriction <30°? No   Is there a Tracheostomy Tube Present? No   3 oz Water Swallow Screen Pass   [place] Coma Scale   Eye Opening 4   Best Verbal Response 5   Best Motor Response 6   [place] Coma Scale Score 15   HEENT   Right Eye Intact; Impaired vision   Left Eye Intact; Impaired vision   Mucous Membrane Moist;Pink; Intact   Teeth Missing teeth   Respiratory   Respiratory Pattern Regular   Respiratory Depth Normal   Respiratory Quality/Effort Unlabored;Dyspnea with exertion   Chest Assessment Chest expansion symmetrical   Breath Sounds   Right Upper Lobe Clear   Right Middle Lobe Diminished   Right Lower Lobe Diminished   Left Upper Lobe Clear   Left Lower Lobe Diminished   Cardiac   Cardiac Regularity Regular   Heart Sounds S1, S2   Cardiac Rhythm NSR  (on tele moniotr)   Cardiac Monitor   Telemetry Monitor On Yes   Telemetry Audible Yes   Telemetry Alarms Set Yes   Telemetry Box Number 3   Gastrointestinal   RUQ Bowel Sounds Active   LUQ Bowel Sounds Active   RLQ Bowel Sounds Active   LLQ Bowel Sounds Active   Abdomen Inspection Rounded; Soft   Tenderness Soft   Passing Flatus Y   Peripheral Vascular   RLE Edema Trace   LLE Edema Trace   RUE Neurovascular Assessment   R Radial Pulse +2   Puncture Site Assessment 1   Location Radial - right   Site Assessment No redness, drainage, swelling or hematoma   Skin Color/Condition   Skin Color/Condition (WDL) WDL   Skin Integrity   Skin

## 2021-04-18 NOTE — PROGRESS NOTES
Hospitalist Progress Note      PCP: Migue Alejandro, ANICETO - CNP    Date of Admission: 4/14/2021    Chief Complaint: Chest pain    Hospital Course: Reviewed H&P    Subjective: Patient currently on nitro gtt. and heparin gtt. Underwent LHC on 4/15/2021 which showed 2/3 bypass grafts occlusion. Not a CT surgical candidate per CT surgery. Cardiology plan on high risk PCI +/-Impella on Monday, 4/19/2021. Patient currently denies any chest pain. Discussed care plan in detail with patient and he verbalized understanding. Offers no new complaints at this time.     Medications:  Reviewed    Infusion Medications    heparin (PORCINE) Infusion 1,520 Units/hr (04/18/21 1317)    nitroGLYCERIN 5 mcg/min (04/17/21 1446)    sodium chloride      sodium chloride       Scheduled Medications    melatonin  5 mg Oral Nightly    sodium chloride flush  5-40 mL Intravenous 2 times per day    sodium chloride flush  5-40 mL Intravenous 2 times per day    aspirin  81 mg Oral Daily    atorvastatin  80 mg Oral Nightly    cefTRIAXone (ROCEPHIN) IV  1,000 mg Intravenous Q24H    clopidogrel  75 mg Oral Daily    metoprolol tartrate  25 mg Oral BID    metroNIDAZOLE  500 mg Intravenous Q8H    sertraline  100 mg Oral Daily    traZODone  100 mg Oral Nightly     PRN Meds: cyclobenzaprine, nitroGLYCERIN, heparin (porcine), heparin (porcine), sodium chloride flush, sodium chloride, sodium chloride, sodium chloride flush, acetaminophen **OR** acetaminophen, polyethylene glycol, promethazine **OR** ondansetron, albuterol, ipratropium, morphine, acetaminophen      Intake/Output Summary (Last 24 hours) at 4/18/2021 1434  Last data filed at 4/18/2021 0830  Gross per 24 hour   Intake 2599.52 ml   Output 975 ml   Net 1624.52 ml       Physical Exam Performed:  /66   Pulse 65   Temp 97.4 °F (36.3 °C) (Oral)   Resp 19   Ht 5' 4\" (1.626 m)   Wt 283 lb 6.4 oz (128.5 kg)   SpO2 97%   BMI 48.65 kg/m²     General appearance: Obese  male in no apparent distress, appears stated age and cooperative. HEENT: Pupils equal, round, and reactive to light. Conjunctivae/corneas clear. Neck: Supple, with full range of motion. No jugular venous distention. Trachea midline. Respiratory:  Normal respiratory effort. Clear to auscultation, bilaterally without Rales/Wheezes/Rhonchi. Cardiovascular: Regular rate and rhythm with normal S1/S2 without murmurs, rubs or gallops. Abdomen: Soft, non-tender, non-distended with normal bowel sounds. Musculoskeletal: No clubbing, cyanosis or edema bilaterally. Full range of motion without deformity. Skin: Skin color, texture, turgor normal.  No rashes or lesions. Tattoos noted  Neurologic:  Neurovascularly intact without any focal sensory/motor deficits. Cranial nerves: II-XII intact, grossly non-focal.  Psychiatric: Alert and oriented, thought content appropriate, normal insight  Capillary Refill: Brisk,< 3 seconds   Peripheral Pulses: +2 palpable, equal bilaterally       Labs:   Recent Labs     04/17/21  0525 04/18/21  0525   WBC 10.2 10.1   HGB 12.2* 13.3*   HCT 36.7* 39.9*    183     Recent Labs     04/17/21  0525 04/18/21  0525    139   K 4.1 4.2    104   CO2 28 29   BUN 9 13   CREATININE 0.9 0.9   CALCIUM 9.0 9.4       No results for input(s): CKTOTAL, TROPONINI in the last 72 hours. Urinalysis:    Lab Results   Component Value Date    NITRU Negative 04/08/2021    BLOODU Negative 04/08/2021    SPECGRAV 1.020 04/08/2021    GLUCOSEU Negative 04/08/2021       Radiology:  CTA ABDOMINAL AORTA W BILAT RUNOFF W CONTRAST   Final Result   1. Approximately 30% stenosis in the right external iliac artery. 2. Multilevel atherosclerotic disease of the SFA is bilaterally with patent   flow to the popliteal artery. 3. Limited evaluation of the distal lower extremities, likely secondary to   contrast bolus timing.    4. Near resolution of the pericolonic inflammation since the prior CT.   5. Radiopaque screw/nail seen just the left navicular bone is noted and   mostly projects within the soft tissues of the foot. Correlate with recent   prior foot surgery and prior positioning of screw, if imaging is available. Active Hospital Problems    Diagnosis Date Noted    CHB (complete heart block) (HCC) [I44.2]     Sinus bradycardia [R00.1]     Coronary artery disease involving native coronary artery of native heart with unstable angina pectoris ([facility] [address] [I25.110] 04/14/2021    Second degree AV block, Mobitz type I [I44.1]     Chronic combined systolic and diastolic congestive heart failure (HCC) [I50.42]      Assessment/Plan:  Severe multivessel disease: CAD  CT surgery consulted -evaluated and stated not a surgical candidate   Continue aspirin;  Plavix and high intensity statin  Cardiology plan on complex PCI +/- Impella with Dr. Kurt Howell on Monday 4/19/2021.  -Currently on heparin and nitro GTT. Bradycardia POA: Per EP  -On Lopressor 25 mg twice daily at home -->transitioned  to metoprolol succinate and GDMT per cardiology. -Recommended 4 week monitor at time of discharge. - Follow up as outpatient to review dizziness and monitor results.     Acute uncomplicated diverticulitis POA :  Continue Rocephin and Flagyl for 10 days currently on day 8 (stop date 4/19/21)      Ischemic cardiomyopathy:  Held Lasix lisinopril for now  Metoprolol as recommended by EP  Cardiology consulted for recommendations.     OANH: Never really had a sleep study done as recommended. Class III obesity:Complicating assessment and treatment. Placing patient at risk for multiple co-morbidities as well as early death and contributing to the patient's presentation. Education, and counseling  COPD: Not in acute exacerbation, nebs    DVT Prophylaxis: IV heparin gtt   Diet: DIET CARDIAC;   Diet NPO Time Specified  Code Status: Full Code    PT/OT Eval Status: Ambulatory     Dispo -likely to stay over the weekend pending complex PCI scheduled for Monday, 4/19/2021. The note was completed using Dragon -speech recognition software & EMR  . Every effort was made to ensure accuracy; however, inadvertent computerized transcription errors may be present.     Joslyn Luis MD

## 2021-04-19 ENCOUNTER — APPOINTMENT (OUTPATIENT)
Dept: CARDIAC CATH/INVASIVE PROCEDURES | Age: 61
DRG: 215 | End: 2021-04-19
Attending: INTERNAL MEDICINE
Payer: COMMERCIAL

## 2021-04-19 ENCOUNTER — ANESTHESIA (OUTPATIENT)
Dept: CARDIAC CATH/INVASIVE PROCEDURES | Age: 61
DRG: 215 | End: 2021-04-19
Payer: COMMERCIAL

## 2021-04-19 VITALS
SYSTOLIC BLOOD PRESSURE: 113 MMHG | OXYGEN SATURATION: 100 % | DIASTOLIC BLOOD PRESSURE: 56 MMHG | RESPIRATION RATE: 12 BRPM

## 2021-04-19 LAB
ANION GAP SERPL CALCULATED.3IONS-SCNC: 7 MMOL/L (ref 3–16)
APTT: 55.4 SEC (ref 24.2–36.2)
BUN BLDV-MCNC: 11 MG/DL (ref 7–20)
CALCIUM SERPL-MCNC: 8.7 MG/DL (ref 8.3–10.6)
CHLORIDE BLD-SCNC: 107 MMOL/L (ref 99–110)
CO2: 27 MMOL/L (ref 21–32)
CREAT SERPL-MCNC: 0.8 MG/DL (ref 0.9–1.3)
EKG ATRIAL RATE: 62 BPM
EKG DIAGNOSIS: NORMAL
EKG P AXIS: 38 DEGREES
EKG P-R INTERVAL: 194 MS
EKG Q-T INTERVAL: 394 MS
EKG QRS DURATION: 74 MS
EKG QTC CALCULATION (BAZETT): 399 MS
EKG R AXIS: -15 DEGREES
EKG T AXIS: 110 DEGREES
EKG VENTRICULAR RATE: 62 BPM
GFR AFRICAN AMERICAN: >60
GFR NON-AFRICAN AMERICAN: >60
GLUCOSE BLD-MCNC: 118 MG/DL (ref 70–99)
HCT VFR BLD CALC: 36.2 % (ref 40.5–52.5)
HEMOGLOBIN: 12 G/DL (ref 13.5–17.5)
MCH RBC QN AUTO: 29.4 PG (ref 26–34)
MCHC RBC AUTO-ENTMCNC: 33.3 G/DL (ref 31–36)
MCV RBC AUTO: 88.4 FL (ref 80–100)
PDW BLD-RTO: 18 % (ref 12.4–15.4)
PLATELET # BLD: 153 K/UL (ref 135–450)
PMV BLD AUTO: 8.3 FL (ref 5–10.5)
POTASSIUM REFLEX MAGNESIUM: 4.2 MMOL/L (ref 3.5–5.1)
RBC # BLD: 4.09 M/UL (ref 4.2–5.9)
SODIUM BLD-SCNC: 141 MMOL/L (ref 136–145)
WBC # BLD: 10 K/UL (ref 4–11)

## 2021-04-19 PROCEDURE — 92978 ENDOLUMINL IVUS OCT C 1ST: CPT

## 2021-04-19 PROCEDURE — 2500000003 HC RX 250 WO HCPCS: Performed by: NURSE ANESTHETIST, CERTIFIED REGISTERED

## 2021-04-19 PROCEDURE — C1725 CATH, TRANSLUMIN NON-LASER: HCPCS

## 2021-04-19 PROCEDURE — B241ZZ3 ULTRASONOGRAPHY OF MULTIPLE CORONARY ARTERIES, INTRAVASCULAR: ICD-10-PCS | Performed by: INTERNAL MEDICINE

## 2021-04-19 PROCEDURE — 92978 ENDOLUMINL IVUS OCT C 1ST: CPT | Performed by: INTERNAL MEDICINE

## 2021-04-19 PROCEDURE — 85027 COMPLETE CBC AUTOMATED: CPT

## 2021-04-19 PROCEDURE — 7100000000 HC PACU RECOVERY - FIRST 15 MIN

## 2021-04-19 PROCEDURE — 2500000003 HC RX 250 WO HCPCS

## 2021-04-19 PROCEDURE — 027135Z DILATION OF CORONARY ARTERY, TWO ARTERIES WITH TWO DRUG-ELUTING INTRALUMINAL DEVICES, PERCUTANEOUS APPROACH: ICD-10-PCS | Performed by: INTERNAL MEDICINE

## 2021-04-19 PROCEDURE — 93005 ELECTROCARDIOGRAM TRACING: CPT | Performed by: INTERNAL MEDICINE

## 2021-04-19 PROCEDURE — 2720000010 HC SURG SUPPLY STERILE

## 2021-04-19 PROCEDURE — C1887 CATHETER, GUIDING: HCPCS

## 2021-04-19 PROCEDURE — 4A023N7 MEASUREMENT OF CARDIAC SAMPLING AND PRESSURE, LEFT HEART, PERCUTANEOUS APPROACH: ICD-10-PCS | Performed by: INTERNAL MEDICINE

## 2021-04-19 PROCEDURE — C1753 CATH, INTRAVAS ULTRASOUND: HCPCS

## 2021-04-19 PROCEDURE — 6360000002 HC RX W HCPCS

## 2021-04-19 PROCEDURE — C1769 GUIDE WIRE: HCPCS

## 2021-04-19 PROCEDURE — 02C13ZZ EXTIRPATION OF MATTER FROM CORONARY ARTERY, TWO ARTERIES, PERCUTANEOUS APPROACH: ICD-10-PCS | Performed by: INTERNAL MEDICINE

## 2021-04-19 PROCEDURE — 2580000003 HC RX 258: Performed by: INTERNAL MEDICINE

## 2021-04-19 PROCEDURE — 3700000000 HC ANESTHESIA ATTENDED CARE

## 2021-04-19 PROCEDURE — C1760 CLOSURE DEV, VASC: HCPCS

## 2021-04-19 PROCEDURE — 80048 BASIC METABOLIC PNL TOTAL CA: CPT

## 2021-04-19 PROCEDURE — 33990 INSJ PERQ VAD L HRT ARTERIAL: CPT | Performed by: INTERNAL MEDICINE

## 2021-04-19 PROCEDURE — 6370000000 HC RX 637 (ALT 250 FOR IP)

## 2021-04-19 PROCEDURE — 92979 ENDOLUMINL IVUS OCT C EA: CPT

## 2021-04-19 PROCEDURE — 2060000000 HC ICU INTERMEDIATE R&B

## 2021-04-19 PROCEDURE — C1894 INTRO/SHEATH, NON-LASER: HCPCS

## 2021-04-19 PROCEDURE — 2580000003 HC RX 258

## 2021-04-19 PROCEDURE — 6360000002 HC RX W HCPCS: Performed by: INTERNAL MEDICINE

## 2021-04-19 PROCEDURE — 92944 HC PRQ CARD REVASC CHRONIC ADDL: CPT

## 2021-04-19 PROCEDURE — 6360000002 HC RX W HCPCS: Performed by: NURSE ANESTHETIST, CERTIFIED REGISTERED

## 2021-04-19 PROCEDURE — 85347 COAGULATION TIME ACTIVATED: CPT

## 2021-04-19 PROCEDURE — 33990 INSJ PERQ VAD L HRT ARTERIAL: CPT

## 2021-04-19 PROCEDURE — 2709999900 HC NON-CHARGEABLE SUPPLY

## 2021-04-19 PROCEDURE — 3700000001 HC ADD 15 MINUTES (ANESTHESIA)

## 2021-04-19 PROCEDURE — 5A0221D ASSISTANCE WITH CARDIAC OUTPUT USING IMPELLER PUMP, CONTINUOUS: ICD-10-PCS | Performed by: INTERNAL MEDICINE

## 2021-04-19 PROCEDURE — 7100000001 HC PACU RECOVERY - ADDTL 15 MIN

## 2021-04-19 PROCEDURE — 6370000000 HC RX 637 (ALT 250 FOR IP): Performed by: INTERNAL MEDICINE

## 2021-04-19 PROCEDURE — 2500000003 HC RX 250 WO HCPCS: Performed by: INTERNAL MEDICINE

## 2021-04-19 PROCEDURE — 92979 ENDOLUMINL IVUS OCT C EA: CPT | Performed by: INTERNAL MEDICINE

## 2021-04-19 PROCEDURE — 92943 PRQ TRLUML REVSC CH OCC ANT: CPT

## 2021-04-19 PROCEDURE — 92933 PRQ TRLML C ATHRC ST ANGIOP1: CPT | Performed by: INTERNAL MEDICINE

## 2021-04-19 PROCEDURE — 02HA3RJ INSERTION OF SHORT-TERM EXTERNAL HEART ASSIST SYSTEM INTO HEART, INTRAOPERATIVE, PERCUTANEOUS APPROACH: ICD-10-PCS | Performed by: INTERNAL MEDICINE

## 2021-04-19 PROCEDURE — C1874 STENT, COATED/COV W/DEL SYS: HCPCS

## 2021-04-19 PROCEDURE — C1724 CATH, TRANS ATHEREC,ROTATION: HCPCS

## 2021-04-19 PROCEDURE — 2580000003 HC RX 258: Performed by: NURSE ANESTHETIST, CERTIFIED REGISTERED

## 2021-04-19 PROCEDURE — 2580000003 HC RX 258: Performed by: ANESTHESIOLOGY

## 2021-04-19 PROCEDURE — 93010 ELECTROCARDIOGRAM REPORT: CPT | Performed by: INTERNAL MEDICINE

## 2021-04-19 PROCEDURE — B2111ZZ FLUOROSCOPY OF MULTIPLE CORONARY ARTERIES USING LOW OSMOLAR CONTRAST: ICD-10-PCS | Performed by: INTERNAL MEDICINE

## 2021-04-19 PROCEDURE — 85730 THROMBOPLASTIN TIME PARTIAL: CPT

## 2021-04-19 RX ORDER — SODIUM CHLORIDE, SODIUM LACTATE, POTASSIUM CHLORIDE, CALCIUM CHLORIDE 600; 310; 30; 20 MG/100ML; MG/100ML; MG/100ML; MG/100ML
INJECTION, SOLUTION INTRAVENOUS CONTINUOUS PRN
Status: DISCONTINUED | OUTPATIENT
Start: 2021-04-19 | End: 2021-04-19 | Stop reason: SDUPTHER

## 2021-04-19 RX ORDER — HYDRALAZINE HYDROCHLORIDE 20 MG/ML
5 INJECTION INTRAMUSCULAR; INTRAVENOUS EVERY 10 MIN PRN
Status: DISCONTINUED | OUTPATIENT
Start: 2021-04-19 | End: 2021-04-20 | Stop reason: HOSPADM

## 2021-04-19 RX ORDER — CLOPIDOGREL 300 MG/1
300 TABLET, FILM COATED ORAL ONCE
Status: COMPLETED | OUTPATIENT
Start: 2021-04-19 | End: 2021-04-19

## 2021-04-19 RX ORDER — SODIUM CHLORIDE 9 MG/ML
25 INJECTION, SOLUTION INTRAVENOUS PRN
Status: DISCONTINUED | OUTPATIENT
Start: 2021-04-19 | End: 2021-04-20 | Stop reason: HOSPADM

## 2021-04-19 RX ORDER — EPHEDRINE SULFATE 50 MG/ML
INJECTION INTRAVENOUS PRN
Status: DISCONTINUED | OUTPATIENT
Start: 2021-04-19 | End: 2021-04-19 | Stop reason: SDUPTHER

## 2021-04-19 RX ORDER — PROPOFOL 10 MG/ML
INJECTION, EMULSION INTRAVENOUS PRN
Status: DISCONTINUED | OUTPATIENT
Start: 2021-04-19 | End: 2021-04-19 | Stop reason: SDUPTHER

## 2021-04-19 RX ORDER — MIDAZOLAM HYDROCHLORIDE 1 MG/ML
INJECTION INTRAMUSCULAR; INTRAVENOUS PRN
Status: DISCONTINUED | OUTPATIENT
Start: 2021-04-19 | End: 2021-04-19 | Stop reason: SDUPTHER

## 2021-04-19 RX ORDER — LIDOCAINE HYDROCHLORIDE 20 MG/ML
INJECTION, SOLUTION INFILTRATION; PERINEURAL PRN
Status: DISCONTINUED | OUTPATIENT
Start: 2021-04-19 | End: 2021-04-19 | Stop reason: SDUPTHER

## 2021-04-19 RX ORDER — OXYCODONE HYDROCHLORIDE AND ACETAMINOPHEN 5; 325 MG/1; MG/1
1 TABLET ORAL PRN
Status: ACTIVE | OUTPATIENT
Start: 2021-04-19 | End: 2021-04-19

## 2021-04-19 RX ORDER — PROMETHAZINE HYDROCHLORIDE 25 MG/ML
6.25 INJECTION, SOLUTION INTRAMUSCULAR; INTRAVENOUS
Status: DISCONTINUED | OUTPATIENT
Start: 2021-04-19 | End: 2021-04-20 | Stop reason: HOSPADM

## 2021-04-19 RX ORDER — DEXAMETHASONE SODIUM PHOSPHATE 4 MG/ML
INJECTION, SOLUTION INTRA-ARTICULAR; INTRALESIONAL; INTRAMUSCULAR; INTRAVENOUS; SOFT TISSUE PRN
Status: DISCONTINUED | OUTPATIENT
Start: 2021-04-19 | End: 2021-04-19 | Stop reason: SDUPTHER

## 2021-04-19 RX ORDER — OXYCODONE HYDROCHLORIDE AND ACETAMINOPHEN 5; 325 MG/1; MG/1
2 TABLET ORAL PRN
Status: ACTIVE | OUTPATIENT
Start: 2021-04-19 | End: 2021-04-19

## 2021-04-19 RX ORDER — LABETALOL HYDROCHLORIDE 5 MG/ML
5 INJECTION, SOLUTION INTRAVENOUS EVERY 10 MIN PRN
Status: DISCONTINUED | OUTPATIENT
Start: 2021-04-19 | End: 2021-04-20 | Stop reason: HOSPADM

## 2021-04-19 RX ORDER — MEPERIDINE HYDROCHLORIDE 50 MG/ML
12.5 INJECTION INTRAMUSCULAR; INTRAVENOUS; SUBCUTANEOUS EVERY 5 MIN PRN
Status: DISCONTINUED | OUTPATIENT
Start: 2021-04-19 | End: 2021-04-20 | Stop reason: HOSPADM

## 2021-04-19 RX ORDER — HEPARIN SODIUM 1000 [USP'U]/ML
INJECTION, SOLUTION INTRAVENOUS; SUBCUTANEOUS
Status: COMPLETED | OUTPATIENT
Start: 2021-04-19 | End: 2021-04-19

## 2021-04-19 RX ORDER — ACETAMINOPHEN 325 MG/1
650 TABLET ORAL EVERY 4 HOURS PRN
Status: DISCONTINUED | OUTPATIENT
Start: 2021-04-19 | End: 2021-04-20 | Stop reason: HOSPADM

## 2021-04-19 RX ORDER — SODIUM CHLORIDE 0.9 % (FLUSH) 0.9 %
5-40 SYRINGE (ML) INJECTION PRN
Status: DISCONTINUED | OUTPATIENT
Start: 2021-04-19 | End: 2021-04-20 | Stop reason: HOSPADM

## 2021-04-19 RX ORDER — ONDANSETRON 2 MG/ML
4 INJECTION INTRAMUSCULAR; INTRAVENOUS PRN
Status: DISCONTINUED | OUTPATIENT
Start: 2021-04-19 | End: 2021-04-20 | Stop reason: HOSPADM

## 2021-04-19 RX ORDER — DIPHENHYDRAMINE HYDROCHLORIDE 50 MG/ML
12.5 INJECTION INTRAMUSCULAR; INTRAVENOUS
Status: ACTIVE | OUTPATIENT
Start: 2021-04-19 | End: 2021-04-19

## 2021-04-19 RX ORDER — PHENYLEPHRINE HCL IN 0.9% NACL 1 MG/10 ML
SYRINGE (ML) INTRAVENOUS PRN
Status: DISCONTINUED | OUTPATIENT
Start: 2021-04-19 | End: 2021-04-19 | Stop reason: SDUPTHER

## 2021-04-19 RX ORDER — ROCURONIUM BROMIDE 10 MG/ML
INJECTION, SOLUTION INTRAVENOUS PRN
Status: DISCONTINUED | OUTPATIENT
Start: 2021-04-19 | End: 2021-04-19 | Stop reason: SDUPTHER

## 2021-04-19 RX ORDER — ONDANSETRON 2 MG/ML
INJECTION INTRAMUSCULAR; INTRAVENOUS PRN
Status: DISCONTINUED | OUTPATIENT
Start: 2021-04-19 | End: 2021-04-19 | Stop reason: SDUPTHER

## 2021-04-19 RX ORDER — SUCCINYLCHOLINE CHLORIDE 20 MG/ML
INJECTION INTRAMUSCULAR; INTRAVENOUS PRN
Status: DISCONTINUED | OUTPATIENT
Start: 2021-04-19 | End: 2021-04-19 | Stop reason: SDUPTHER

## 2021-04-19 RX ORDER — FENTANYL CITRATE 50 UG/ML
INJECTION, SOLUTION INTRAMUSCULAR; INTRAVENOUS PRN
Status: DISCONTINUED | OUTPATIENT
Start: 2021-04-19 | End: 2021-04-19 | Stop reason: SDUPTHER

## 2021-04-19 RX ORDER — SODIUM CHLORIDE 0.9 % (FLUSH) 0.9 %
5-40 SYRINGE (ML) INJECTION EVERY 12 HOURS SCHEDULED
Status: DISCONTINUED | OUTPATIENT
Start: 2021-04-19 | End: 2021-04-20 | Stop reason: HOSPADM

## 2021-04-19 RX ADMIN — Medication 10 ML: at 20:59

## 2021-04-19 RX ADMIN — Medication 100 MCG: at 10:17

## 2021-04-19 RX ADMIN — FENTANYL CITRATE 25 MCG: 50 INJECTION INTRAMUSCULAR; INTRAVENOUS at 12:15

## 2021-04-19 RX ADMIN — SODIUM CHLORIDE, POTASSIUM CHLORIDE, SODIUM LACTATE AND CALCIUM CHLORIDE: 600; 310; 30; 20 INJECTION, SOLUTION INTRAVENOUS at 00:12

## 2021-04-19 RX ADMIN — Medication 100 MCG: at 08:07

## 2021-04-19 RX ADMIN — Medication 100 MCG: at 08:18

## 2021-04-19 RX ADMIN — PHENYLEPHRINE HYDROCHLORIDE 20 MCG/MIN: 10 INJECTION INTRAVENOUS at 08:07

## 2021-04-19 RX ADMIN — ATORVASTATIN CALCIUM 80 MG: 80 TABLET, FILM COATED ORAL at 21:05

## 2021-04-19 RX ADMIN — TRAZODONE HYDROCHLORIDE 100 MG: 50 TABLET ORAL at 21:05

## 2021-04-19 RX ADMIN — Medication 0.5 MG: at 13:33

## 2021-04-19 RX ADMIN — Medication 100 MCG: at 10:18

## 2021-04-19 RX ADMIN — FENTANYL CITRATE 25 MCG: 50 INJECTION INTRAMUSCULAR; INTRAVENOUS at 12:09

## 2021-04-19 RX ADMIN — CLOPIDOGREL 300 MG: 300 TABLET, FILM COATED ORAL at 07:20

## 2021-04-19 RX ADMIN — CEFTRIAXONE SODIUM 1000 MG: 1 INJECTION, POWDER, FOR SOLUTION INTRAMUSCULAR; INTRAVENOUS at 20:59

## 2021-04-19 RX ADMIN — FENTANYL CITRATE 25 MCG: 50 INJECTION INTRAMUSCULAR; INTRAVENOUS at 12:21

## 2021-04-19 RX ADMIN — ASPIRIN 81 MG: 81 TABLET, COATED ORAL at 07:19

## 2021-04-19 RX ADMIN — Medication 10 ML: at 14:38

## 2021-04-19 RX ADMIN — PROPOFOL 150 MG: 10 INJECTION, EMULSION INTRAVENOUS at 07:44

## 2021-04-19 RX ADMIN — Medication 5 MG: at 21:05

## 2021-04-19 RX ADMIN — Medication 0.5 MG: at 13:00

## 2021-04-19 RX ADMIN — METOPROLOL TARTRATE 25 MG: 25 TABLET, FILM COATED ORAL at 21:05

## 2021-04-19 RX ADMIN — ONDANSETRON 4 MG: 2 INJECTION INTRAMUSCULAR; INTRAVENOUS at 07:37

## 2021-04-19 RX ADMIN — Medication 100 MCG: at 08:13

## 2021-04-19 RX ADMIN — HEPARIN SODIUM 2000 UNITS: 1000 INJECTION, SOLUTION INTRAVENOUS; SUBCUTANEOUS at 09:53

## 2021-04-19 RX ADMIN — HEPARIN SODIUM 3000 UNITS: 1000 INJECTION, SOLUTION INTRAVENOUS; SUBCUTANEOUS at 10:36

## 2021-04-19 RX ADMIN — SUCCINYLCHOLINE CHLORIDE 160 MG: 20 INJECTION, SOLUTION INTRAMUSCULAR; INTRAVENOUS at 07:44

## 2021-04-19 RX ADMIN — ROCURONIUM BROMIDE 50 MG: 10 SOLUTION INTRAVENOUS at 08:04

## 2021-04-19 RX ADMIN — Medication 0.5 MG: at 13:07

## 2021-04-19 RX ADMIN — EPHEDRINE SULFATE 5 MG: 50 INJECTION INTRAVENOUS at 08:18

## 2021-04-19 RX ADMIN — DEXAMETHASONE SODIUM PHOSPHATE 4 MG: 4 INJECTION, SOLUTION INTRAMUSCULAR; INTRAVENOUS at 07:44

## 2021-04-19 RX ADMIN — LIDOCAINE HYDROCHLORIDE 60 MG: 20 INJECTION, SOLUTION INFILTRATION; PERINEURAL at 07:44

## 2021-04-19 RX ADMIN — MIDAZOLAM HYDROCHLORIDE 2 MG: 2 INJECTION, SOLUTION INTRAMUSCULAR; INTRAVENOUS at 07:37

## 2021-04-19 RX ADMIN — ROCURONIUM BROMIDE 50 MG: 10 SOLUTION INTRAVENOUS at 08:18

## 2021-04-19 RX ADMIN — METRONIDAZOLE 500 MG: 500 INJECTION, SOLUTION INTRAVENOUS at 00:00

## 2021-04-19 RX ADMIN — Medication 100 MCG: at 10:16

## 2021-04-19 RX ADMIN — HEPARIN SODIUM 14000 UNITS: 1000 INJECTION, SOLUTION INTRAVENOUS; SUBCUTANEOUS at 09:32

## 2021-04-19 RX ADMIN — METRONIDAZOLE 500 MG: 500 INJECTION, SOLUTION INTRAVENOUS at 16:55

## 2021-04-19 RX ADMIN — HEPARIN SODIUM 1520 UNITS/HR: 10000 INJECTION, SOLUTION INTRAVENOUS at 05:56

## 2021-04-19 RX ADMIN — SODIUM CHLORIDE, SODIUM LACTATE, POTASSIUM CHLORIDE, AND CALCIUM CHLORIDE: .6; .31; .03; .02 INJECTION, SOLUTION INTRAVENOUS at 07:37

## 2021-04-19 ASSESSMENT — PULMONARY FUNCTION TESTS
PIF_VALUE: 28
PIF_VALUE: 35
PIF_VALUE: 26
PIF_VALUE: 27
PIF_VALUE: 27
PIF_VALUE: 26
PIF_VALUE: 27
PIF_VALUE: 27
PIF_VALUE: 26
PIF_VALUE: 21
PIF_VALUE: 29
PIF_VALUE: 27
PIF_VALUE: 26
PIF_VALUE: 27
PIF_VALUE: 19
PIF_VALUE: 30
PIF_VALUE: 26
PIF_VALUE: 28
PIF_VALUE: 27
PIF_VALUE: 27
PIF_VALUE: 26
PIF_VALUE: 19
PIF_VALUE: 28
PIF_VALUE: 26
PIF_VALUE: 2
PIF_VALUE: 1
PIF_VALUE: 17
PIF_VALUE: 26
PIF_VALUE: 30
PIF_VALUE: 26
PIF_VALUE: 31
PIF_VALUE: 17
PIF_VALUE: 26
PIF_VALUE: 25
PIF_VALUE: 26
PIF_VALUE: 27
PIF_VALUE: 1
PIF_VALUE: 27
PIF_VALUE: 26
PIF_VALUE: 18
PIF_VALUE: 19
PIF_VALUE: 26
PIF_VALUE: 26
PIF_VALUE: 27
PIF_VALUE: 26
PIF_VALUE: 27
PIF_VALUE: 27
PIF_VALUE: 19
PIF_VALUE: 26
PIF_VALUE: 26
PIF_VALUE: 27
PIF_VALUE: 26
PIF_VALUE: 27
PIF_VALUE: 26
PIF_VALUE: 27
PIF_VALUE: 26
PIF_VALUE: 27
PIF_VALUE: 27
PIF_VALUE: 25
PIF_VALUE: 1
PIF_VALUE: 27
PIF_VALUE: 27
PIF_VALUE: 29
PIF_VALUE: 33
PIF_VALUE: 0
PIF_VALUE: 27
PIF_VALUE: 27
PIF_VALUE: 26
PIF_VALUE: 26
PIF_VALUE: 28
PIF_VALUE: 27
PIF_VALUE: 26
PIF_VALUE: 26
PIF_VALUE: 27
PIF_VALUE: 28
PIF_VALUE: 26
PIF_VALUE: 26
PIF_VALUE: 19
PIF_VALUE: 26
PIF_VALUE: 27
PIF_VALUE: 27
PIF_VALUE: 28
PIF_VALUE: 26
PIF_VALUE: 26
PIF_VALUE: 27
PIF_VALUE: 26
PIF_VALUE: 26
PIF_VALUE: 28
PIF_VALUE: 26
PIF_VALUE: 27
PIF_VALUE: 39
PIF_VALUE: 2
PIF_VALUE: 27
PIF_VALUE: 26
PIF_VALUE: 18
PIF_VALUE: 27
PIF_VALUE: 27
PIF_VALUE: 20
PIF_VALUE: 1
PIF_VALUE: 19
PIF_VALUE: 18
PIF_VALUE: 28
PIF_VALUE: 27
PIF_VALUE: 27
PIF_VALUE: 28
PIF_VALUE: 12
PIF_VALUE: 27
PIF_VALUE: 0
PIF_VALUE: 27
PIF_VALUE: 26
PIF_VALUE: 27
PIF_VALUE: 24
PIF_VALUE: 26
PIF_VALUE: 27
PIF_VALUE: 26
PIF_VALUE: 26
PIF_VALUE: 18
PIF_VALUE: 27
PIF_VALUE: 26
PIF_VALUE: 27
PIF_VALUE: 28
PIF_VALUE: 26
PIF_VALUE: 26
PIF_VALUE: 27
PIF_VALUE: 26
PIF_VALUE: 26
PIF_VALUE: 27
PIF_VALUE: 26
PIF_VALUE: 27
PIF_VALUE: 26
PIF_VALUE: 15
PIF_VALUE: 26
PIF_VALUE: 26
PIF_VALUE: 27
PIF_VALUE: 1
PIF_VALUE: 26
PIF_VALUE: 26

## 2021-04-19 ASSESSMENT — PAIN DESCRIPTION - PAIN TYPE
TYPE: SURGICAL PAIN

## 2021-04-19 ASSESSMENT — PAIN DESCRIPTION - DESCRIPTORS
DESCRIPTORS: ACHING

## 2021-04-19 ASSESSMENT — PAIN DESCRIPTION - PROGRESSION: CLINICAL_PROGRESSION: GRADUALLY WORSENING

## 2021-04-19 ASSESSMENT — PAIN DESCRIPTION - FREQUENCY
FREQUENCY: INTERMITTENT
FREQUENCY: INTERMITTENT

## 2021-04-19 ASSESSMENT — PAIN DESCRIPTION - ORIENTATION
ORIENTATION: LEFT

## 2021-04-19 ASSESSMENT — PAIN DESCRIPTION - LOCATION
LOCATION: LEG
LOCATION: LEG

## 2021-04-19 ASSESSMENT — PAIN - FUNCTIONAL ASSESSMENT: PAIN_FUNCTIONAL_ASSESSMENT: ACTIVITIES ARE NOT PREVENTED

## 2021-04-19 ASSESSMENT — PAIN SCALES - GENERAL
PAINLEVEL_OUTOF10: 6
PAINLEVEL_OUTOF10: 0
PAINLEVEL_OUTOF10: 0
PAINLEVEL_OUTOF10: 6

## 2021-04-19 ASSESSMENT — PAIN DESCRIPTION - ONSET: ONSET: AWAKENED FROM SLEEP

## 2021-04-19 NOTE — PROGRESS NOTES
Brief Pre-Op Note/Sedation Assessment      Sanjana Arias  1960  Cath Pool Rm/NONE      [id number]  7:29 AM    Planned Procedure: Cardiac Catheterization Procedure    Post Procedure Plan: Return to same level of care    Consent: I have discussed with the patient and/or the patient representative the indication, alternatives, and the possible risks and/or complications of the planned procedure and the anesthesia methods. The patient and/or patient representative appear to understand and agree to proceed. Pt/family understand this is a high risk procedure, there is a higher risk of complications/death with such procedures, having heard this, they understand r/b/a/o and wish to proceed. Chief Complaint: Chest Pain/Pressure      Indications for Cath Procedure:  ACS > 24 hrs  Anginal Classification within 2 weeks:  CCS IV - Inability to perform any activity without angina or angina at rest, i.e., severe limitation  NYHA Heart Failure Class within 2 weeks: No symptoms  Is Cath Lab Visit Valve-related?: No  Surgical Risk: N/A  Functional Type: N/A    Anti- Anginal Meds within 2 weeks:   Yes: Beta Blockers, Aspirin and Statin (Any)    Stress or Imaging Studies Performed (within 6 months):  Stress Test with SPECT Result: Positive:  anteroapical Risk/Extent of Ischemia:  High     Vital Signs:  /78   Pulse 73   Temp 98.2 °F (36.8 °C) (Oral)   Resp 17   Ht 5' 4\" (1.626 m)   Wt 285 lb 9.6 oz (129.5 kg)   SpO2 95%   BMI 49.02 kg/m²     Allergies:   Allergies   Allergen Reactions    Dilaudid [Hydromorphone Hcl] Nausea And Vomiting    Codeine Itching       Past Medical History:  Past Medical History:   Diagnosis Date    Acute diverticulitis 04/10/2021    Acute kidney injury ([facility] 04/08/2021    Acute on chronic respiratory failure with hypoxemia (HCC)     Acute respiratory failure ([facility] 08/19/2020    Acute respiratory failure with hypoxia (HCC)     Anxiety 01/06/2020    Aortic valve calcification 09/2020    seen on lung CT    CAD in native artery 04/14/2021    Chronic obstructive pulmonary disease ([facility] 09/03/2019    PFT done 9/03/19    Chronic systolic congestive heart failure ([address] 04/10/2021    Class 3 severe obesity with body mass index (BMI) of 45.0 to 49.9 in adult [facility])     Coronary artery calcification 09/2020    seen on lung ct    Coronary artery disease involving native heart with angina pectoris ([address] 09/22/2020    Crush injury of right foot 07/23/2015    DDD (degenerative disc disease), lumbar 01/06/2020    Depression 01/06/2020    Diverticulitis of colon 04/10/2021    Erectile dysfunction 01/06/2020    Fatigue 01/06/2020    History of alcohol abuse 01/06/2020    History of drug use     HNP (herniated nucleus pulposus), lumbar 01/06/2020    Hyperglycemia 01/06/2020    Hyperlipidemia     Hypersomnia 01/06/2020    Insomnia     Ischemic cardiomyopathy     Kidney stone     Lumbar radiculopathy 01/06/2020    Lumbar spine pain 01/06/2020    LVH (left ventricular hypertrophy)     seen on echo 8/2020, moderate    Mobitz type I Wenckebach atrioventricular block 04/10/2021    Moderate protein-calorie malnutrition ([facility] 03/17/2020    Observed sleep apnea 01/06/2020    OANH (obstructive sleep apnea) 01/06/2020    Pneumonia, primary atypical     Reactive depression 01/06/2020    S/P three vessel coronary artery bypass 10/26/2020    Spondylosis without myelopathy or radiculopathy, lumbar region 01/06/2020    Tobacco abuse 01/06/2020    Tobacco use 01/06/2020         Surgical History:  Past Surgical History:   Procedure Laterality Date    CHOLECYSTECTOMY      CORONARY ARTERY BYPASS GRAFT N/A 9/25/2020    CORONARY ARTERY BYPASS GRAFTING X3, INTERNAL MAMMARY ARTERY, SAPHENOUS VEIN GRAFT, ON PUMP, STERNAL PLATING, 5 LEVEL BILATERAL INTERCOSTAL NERVE BLOCK, PLATELET GEL APPLICATION performed by Mihai Pearce MD at [address]  03/16/2011    cystoscopy left ureteroscopy, left retrograde, double J stent placement    FINGER AMPUTATION Right 2/2/2021    RIGHT MIDDLE FINGER IRRIGATION AND DEBRIDEMENT WITH REVISION AMPUTATION AND BONE SHORTENING, POSSIBLE NAIL ABLATION performed by Alecia Montez MD at [address] Right 12/24/2019    RIGHT LUMBAR FIVE SACRAL ONE TRANSFORAMINAL EPIDURAL STEROID INJECTION SITE CONFIRMED BY FLUOROSCOPY performed by Yusuf Perdomo MD at [address] [facility] Right 1/7/2020    RIGHT LUMBAR FOUR AND LUMBAR FIVE TRANSFORAMINAL EPIDURAL STEROID INJECTION SITE CONFIRMED BY FLUOROSCOPY performed by Yusuf Perdomo MD at [place] [address]         Medications:  Current Facility-Administered Medications   Medication Dose Route Frequency Provider Last Rate Last Admin    lactated ringers infusion   Intravenous Continuous Jewel Galicia  mL/hr at 04/19/21 0012 New Bag at 04/19/21 0012    sodium chloride flush 0.9 % injection 10 mL  10 mL Intravenous 2 times per day Jewel Galicia MD        sodium chloride flush 0.9 % injection 10 mL  10 mL Intravenous PRN Jewel Galicia MD   10 mL at 04/18/21 2355    0.9 % sodium chloride infusion  25 mL Intravenous PRN Jewel Galicia MD        cyclobenzaprine (FLEXERIL) tablet 10 mg  10 mg Oral TID PRN ANICETO Harrell - CNP   10 mg at 04/18/21 1755    heparin 25,000 units in dextrose 5% 250 mL (premix) infusion  1,520 Units/hr Intravenous Continuous Lavon Lopez MD   Stopped at 04/19/21 0729    melatonin disintegrating tablet 5 mg  5 mg Oral Nightly Ahmad CRAIG Mccoy, DO   5 mg at 04/18/21 2048    nitroGLYCERIN (NITROSTAT) SL tablet 0.4 mg  0.4 mg Sublingual Q5 Min PRN Lavon Lopez MD   0.4 mg at 04/15/21 1031    heparin (porcine) injection 4,000 Units  4,000 Units Intravenous PRN Alisson Knowles MD        heparin (porcine) injection 2,000 Units  2,000 Units Intravenous PRN Alisson Knowles MD   2,000 Units at 04/16/21 2049    nitroGLYCERIN 50 mg in dextrose 5% 250 mL infusion  5-200 mcg/min Intravenous Continuous Torrie Lopez MD 1.5 mL/hr at 04/17/21 1446 5 mcg/min at 04/17/21 1446    sodium chloride flush 0.9 % injection 5-40 mL  5-40 mL Intravenous 2 times per day Earsatya Sauceda, APRN - CNP   10 mL at 04/17/21 1958    sodium chloride flush 0.9 % injection 5-40 mL  5-40 mL Intravenous PRN Dameon Sauceda, APRN - CNP   10 mL at 04/16/21 1004    0.9 % sodium chloride infusion  25 mL Intravenous PRN ANICETO José - CNP        0.9 % sodium chloride infusion  25 mL Intravenous PRN Cyril Olivas MD        sodium chloride flush 0.9 % injection 5-40 mL  5-40 mL Intravenous 2 times per day Cyril Olivas MD   10 mL at 04/17/21 2040    sodium chloride flush 0.9 % injection 5-40 mL  5-40 mL Intravenous PRN Cyril Olivas MD   10 mL at 04/18/21 0522    acetaminophen (TYLENOL) tablet 650 mg  650 mg Oral Q6H PRN Cyril Olivas MD   650 mg at 04/15/21 0820    Or    acetaminophen (TYLENOL) suppository 650 mg  650 mg Rectal Q6H PRN Cyril Olivas MD        polyethylene glycol [facility]) packet 17 g  17 g Oral Daily PRN Cyril Olivas MD        promethazine (PHENERGAN) tablet 12.5 mg  12.5 mg Oral Q6H PRN Cyril Olivas MD        Or    ondansetron [facility]) injection 4 mg  4 mg Intravenous Q6H PRN Cyril Olivas MD        albuterol (PROVENTIL) nebulizer solution 2.5 mg  2.5 mg Nebulization Q6H PRN Cyril Olivas MD        aspirin EC tablet 81 mg  81 mg Oral Daily Cyril Olivas MD   81 mg at 04/19/21 0719    atorvastatin (LIPITOR) tablet 80 mg  80 mg Oral Nightly Cyril Olivas MD   80 mg at 04/18/21 2048    cefTRIAXone (ROCEPHIN) 1000 mg IVPB in 50 mL D5W minibag  1,000 mg Intravenous Q24H Cyril Olivas MD   Stopped at 04/18/21 2222    clopidogrel (PLAVIX) tablet 75 mg  75 mg Oral Daily Cyril Olivas MD   75 mg at 04/18/21 0835    ipratropium (ATROVENT) 0.02 % nebulizer solution 0.5 mg  0.5 mg Nebulization TID PRN Jose Reed MD        metoprolol tartrate (LOPRESSOR) tablet 25 mg  25 mg Oral BID Jose Reed MD   25 mg at 04/18/21 2048    metronidazole (FLAGYL) 500 mg in NaCl 100 mL IVPB premix  500 mg Intravenous Q8H Jose Reed MD   Stopped at 04/19/21 0100    morphine (PF) injection 2 mg  2 mg Intravenous Q3H PRN Jose Reed MD   2 mg at 04/17/21 1136    sertraline (ZOLOFT) tablet 100 mg  100 mg Oral Daily Jose Reed MD   100 mg at 04/18/21 0835    traZODone (DESYREL) tablet 100 mg  100 mg Oral Nightly Jose Reed MD   100 mg at 04/18/21 2048    acetaminophen (TYLENOL) tablet 650 mg  650 mg Oral Q4H PRN Leann Layton MD               Pre-Sedation:    Pre-Sedation Documentation and Exam:  I have assessed the patient and agree with the H&P present on the chart. Prior History of Anesthesia Complications:   none    Modified Mallampati:  III (soft palate, base of uvula visible)    ASA Classification:  Class 3 - A patient with severe systemic disease that limits activity but is not incapacitating      Adele Scale: Activity:  2 - Able to move 4 extremities voluntarily on command  Respiration:  2 - Able to breathe deeply and cough freely  Circulation:  2 - BP+/- 20mmHg of normal  Consciousness:  2 - Fully awake  Oxygen Saturation (color):  2 - Able to maintain oxygen saturation >92% on room air    Sedation/Anesthesia Plan:  Guard the patient's safety and welfare. Minimize physical discomfort and pain. Minimize negative psychological responses to treatment by providing sedation and analgesia and maximize the potential amnesia. Patient to meet pre-procedure discharge plan. Medication Planned:  As per anesthesiology service.         Electronically signed by Leann Layton MD on 4/19/2021 at 7:29 AM

## 2021-04-19 NOTE — PROGRESS NOTES
**OR** ondansetron, albuterol, ipratropium, morphine, acetaminophen      Intake/Output Summary (Last 24 hours) at 4/19/2021 1526  Last data filed at 4/19/2021 1251  Gross per 24 hour   Intake 2747.2 ml   Output 1325 ml   Net 1422.2 ml       Physical Exam Performed:  /66   Pulse 64   Temp 97.6 °F (36.4 °C) (Oral)   Resp 16   Ht 5' 4\" (1.626 m)   Wt 285 lb 9.6 oz (129.5 kg)   SpO2 96%   BMI 49.02 kg/m²     General appearance: Obese  male in no apparent distress, appears stated age and cooperative. HEENT: Pupils equal, round, and reactive to light. Conjunctivae/corneas clear. Neck: Supple, with full range of motion. No jugular venous distention. Trachea midline. Respiratory:  Normal respiratory effort. Clear to auscultation, bilaterally without Rales/Wheezes/Rhonchi. Cardiovascular: Regular rate and rhythm with normal S1/S2 without murmurs, rubs or gallops. Abdomen: Soft, non-tender, non-distended with normal bowel sounds. Musculoskeletal: No clubbing, cyanosis or edema bilaterally. Full range of motion without deformity. Skin: Skin color, texture, turgor normal.  No rashes or lesions. Tattoos noted  Neurologic:  Neurovascularly intact without any focal sensory/motor deficits. Cranial nerves: II-XII intact, grossly non-focal.  Psychiatric: Alert and oriented, thought content appropriate, normal insight  Capillary Refill: Brisk,< 3 seconds   Peripheral Pulses: +2 palpable, equal bilaterally       Labs:   Recent Labs     04/17/21  0525 04/18/21  0525 04/19/21  0553   WBC 10.2 10.1 10.0   HGB 12.2* 13.3* 12.0*   HCT 36.7* 39.9* 36.2*    183 153     Recent Labs     04/17/21  0525 04/18/21  0525 04/19/21  0553    139 141   K 4.1 4.2 4.2    104 107   CO2 28 29 27   BUN 9 13 11   CREATININE 0.9 0.9 0.8*   CALCIUM 9.0 9.4 8.7       No results for input(s): CKTOTAL, TROPONINI in the last 72 hours.     Urinalysis:    Lab Results   Component Value Date    NITRU Negative 04/08/2021 BLOODU Negative 04/08/2021    SPECGRAV 1.020 04/08/2021    GLUCOSEU Negative 04/08/2021       Radiology:  CTA ABDOMINAL AORTA W BILAT RUNOFF W CONTRAST   Final Result   1. Approximately 30% stenosis in the right external iliac artery. 2. Multilevel atherosclerotic disease of the SFA is bilaterally with patent   flow to the popliteal artery. 3. Limited evaluation of the distal lower extremities, likely secondary to   contrast bolus timing. 4. Near resolution of the pericolonic inflammation since the prior CT. 5. Radiopaque screw/nail seen just the left navicular bone is noted and   mostly projects within the soft tissues of the foot. Correlate with recent   prior foot surgery and prior positioning of screw, if imaging is available. Active Hospital Problems    Diagnosis Date Noted    CHB (complete heart block) (HCC) [I44.2]     Sinus bradycardia [R00.1]     Coronary artery disease involving native coronary artery of native heart with unstable angina pectoris (HCC) [I25.110] 04/14/2021    Second degree AV block, Mobitz type I [I44.1]     Chronic combined systolic and diastolic congestive heart failure (HCC) [I50.42]      Assessment/Plan:  Severe multivessel disease: CAD  CT surgery consulted -evaluated and stated not a surgical candidate   Continue aspirin;  Plavix and high intensity statin  Cardiology following and performed successful rotational atherectomy and PCI of D1 & Left main/LAD PCI   4/19/2021.  - Continue Post Cath protocol     Bradycardia POA: Per EP  -On Lopressor 25 mg twice daily at home -->transitioned  to metoprolol succinate and GDMT per cardiology. -Recommended 4 week monitor at time of discharge.   - Follow up as outpatient to review dizziness and monitor results.     Acute uncomplicated diverticulitis POA :  Continue Rocephin and Flagyl for 10 days currently on day 8 (stop date 4/19/21)      Ischemic cardiomyopathy:  Held Lasix lisinopril for now  Metoprolol as recommended by EP  Cardiology consulted for recommendations.     OANH: Never really had a sleep study done as recommended. Class III obesity:Complicating assessment and treatment. Placing patient at risk for multiple co-morbidities as well as early death and contributing to the patient's presentation. Education, and counseling  COPD: Not in acute exacerbation, nebs    DVT Prophylaxis: IV heparin gtt   Diet: DIET CARDIAC;  Code Status: Full Code    PT/OT Eval Status: Ambulatory     Dispo -likely tomorrow if Maddie Millan with cardiology      The note was completed using Dragon -speech recognition software & EMR  . Every effort was made to ensure accuracy; however, inadvertent computerized transcription errors may be present.     Nadege Murdock MD

## 2021-04-19 NOTE — PROCEDURES
CARDIAC CATHETERIZATION REPORT     Procedure Date:  2021  Patient Name: Lois Garcia  MRN: [id number] : 1960      INDICATION     Unstable angina    PROCEDURES PERFORMED     Left heart catheterization  Coronary angiogam  Coronary cath  Temporary transvenous pacer insertion and removal    Insertion of short term external heart assist system into heart, intraoperative, percutaneous approach  Assistance with cardiac output using impeller pump, continuous    IVUS of left main  IVUS of LAD  IVUS of D1    Rotational atherectomy of left main  Rotational atherectomy of LAD  Rotational atherectomy of D1    PCI of D1 with single drug-eluting stent  PCI of left main/LAD with single drug-eluting stent        PROCEDURE DESCRIPTION   Risks/benefits/alternatives/outcomes were discussed with patient and/or family and informed consent was obtained. Using the [facility] scale, the patient's right radial artery was found to be a level B. Patient was prepped draped in the usual sterile fashion. Local anaesthetic was applied over puncture site. Using a back wall technique and ultrasound guidance, a 6 Frisian Terumo sheath was inserted into right radial artery. Verapamil, nitroglycerin, nicardipine were administered through the sheath. Using fluoroscopic and ultrasound guidance, an 8 Gambian sheath was inserted into the right common femoral vein. A temporary transvenous pacer was inserted and capture was demonstrated and this was placed in backup mode during the procedure. Temporary transvenous pacer was removed at the end of the case and the venous sheath was sutured in place for later removal.  Using similar technique, a 6 Gambian sheath was placed into the left common femoral artery. A single Perclose device was then inserted using preclosed technique and then the site was dilated with 10 and 12 Western Janis dilators and a an Impella CP sheath was placed.   Using a pigtail catheter, the aortic valve was crossed for left heart catheterization. Then the 0.018 inch Impella wire was advanced into the left ventricle and then the pigtail was exchanged for the Impella catheter. Impella was used during the procedure and was able to be weaned and removed at the end of the procedure. Using a 6 Luxembourgish 110 cm Terumo sheath along with a 10 mm balloon, a dry close technique was employed along with the single Perclose to close the left common femoral artery arteriotomy. At the conclusion of the procedure, a TR band was placed over the puncture site and hemostasis was obtained. There were no immediate complications. Sedation was provided by the anesthesiology service. 325 cc contrast was utilized. <50cc EBL. FINDINGS     Left heart catheterization    LVEDP  5     See diagnostic catheterization report for full details, would add that there is severe tortuosity and eccentric plaque within the left main, LAD and D1 which proximal to mid was 75% and distally was 99%. PERCUTANEOUS INTERVENTION DESCRIPTION     Heparin was used for anticoagulation, patient was preloaded with Brilinta. A 7 Luxembourgish XB 3.5 guiding cath was used to intubate the left main. A choice floppy wire with a Turnpike microcatheter was initially taken however the lesion could not be crossed with this. Several other wires were taken including a cougar XT and PT to moderate support wire. Additional microcatheters were taken but it remained difficult to cross into D1 despite use of a Corsair pro access microcatheter. Ultimately a super cross microcatheter with the PT to moderate support wire was able to be utilized to cross the lesion into D1. The super cross microcatheter was then removed in favor of the Corsair XS pro microcatheter which was able to be advanced distally. Then the [facility] wire was able to be utilized successfully to cross the distal lesions in the diagonal artery.   The Corsair microcatheter was then advanced distally and the [facility] wire was removed in favor of a workhorse wire. Lesions were then dilated distally with 2 and 2.25 mm balloons including cutting balloon. IVUS was then performed which revealed a minimal luminal diameter distally of 2 to 2-1/2 mm and more proximally in the diagonal branch of 3 mm. The LAD was felt to be a 4 to 4.5 mm vessel with extensive disease and calcification. As such rotational atherectomy was performed with a 1.5 mm bur from the left main into LAD as well as D1. Lesions were then stented with Abbott Xience Marisel 2.25 x 38 mm drug-eluting stent as well as a 3.0 x 38 mm drug-eluting stent. Stents were postdilated with 2.5, 3.0, 4.0 and 4.5 mm noncompliant balloons from distal to proximal segments. Follow-up IVUS showed good stent apposition/expansion. There was 0% residual stenosis. There was RAY 3 flow before and after PCI.              CONCLUSIONS:     Successful rotational atherectomy and PCI of D1 with single drug-eluting stent  Successful rotational atherectomy and PCI of left main/LAD with single drug-eluting stent  Medical therapy for LCx , this lesion does not appear to be amenable for PCI

## 2021-04-19 NOTE — FLOWSHEET NOTE
04/18/21 2037   Assessment   Charting Type Shift assessment   Neurological   Neuro (WDL) WDL   Level of Consciousness Alert (0)   Orientation Level Oriented X4   Cognition Appropriate judgement; Appropriate safety awareness; Appropriate attention/concentration; Appropriate for developmental age; Follows commands; No short term memory loss   Porfirio Coma Scale   Eye Opening 4   Best Verbal Response 5   Best Motor Response 6   Marion Coma Scale Score 15   HEENT   HEENT (WDL) X   Right Eye Intact; Impaired vision   Left Eye Intact; Impaired vision   Teeth Missing teeth   Respiratory   Respiratory (WDL) X   Respiratory Pattern Regular   Respiratory Depth Normal   Respiratory Quality/Effort Unlabored;Dyspnea with exertion   Chest Assessment Chest expansion symmetrical   Breath Sounds   Right Upper Lobe Clear   Right Middle Lobe Diminished   Right Lower Lobe Diminished   Left Upper Lobe Clear   Left Lower Lobe Diminished   Cardiac   Cardiac (WDL) WDL   Cardiac Regularity Regular   Heart Sounds S1, S2   Cardiac Rhythm NSR   Cardiac Monitor   Telemetry Monitor On Yes   Telemetry Audible Yes   Telemetry Alarms Set Yes   Gastrointestinal   Abdominal (WDL) WDL   RUQ Bowel Sounds Active   LUQ Bowel Sounds Active   RLQ Bowel Sounds Active   LLQ Bowel Sounds Active   Abdomen Inspection Rounded; Soft   Peripheral Vascular   Peripheral Vascular (WDL) X   RLE Edema Trace   LLE Edema Trace   Skin Color/Condition   Skin Color/Condition (WDL) WDL   Skin Integrity   Skin Integrity (WDL) WDL   Musculoskeletal   Musculoskeletal (WDL) WDL   RUE Full movement   LUE Full movement   RL Extremity Full movement   LL Extremity Full movement   Genitourinary   Genitourinary (WDL) WDL   Anus/Rectum   Anus/Rectum (WDL) WDL   Psychosocial   Psychosocial (WDL) WDL

## 2021-04-19 NOTE — ANESTHESIA PRE PROCEDURE
Department of Anesthesiology  Preprocedure Note       Name:  Theresa Mendoza   Age:  64 y.o.  :  1960                                          MRN:  [id number]         Date:  2021      Surgeon: * Surgery not found *    Procedure:     Medications prior to admission:   Prior to Admission medications    Medication Sig Start Date End Date Taking?  Authorizing Provider   amoxicillin-clavulanate (AUGMENTIN) 875-125 MG per tablet Take 1 tablet by mouth 2 times daily for 5 days 21  Jose Daniel Cruz PA-C   lisinopril (PRINIVIL;ZESTRIL) 20 MG tablet Take 0.5 tablets by mouth daily 3/15/21   Uzma Coppola MD   furosemide (LASIX) 80 MG tablet Take 1 tablet by mouth 2 times daily 3/3/21 3/3/22  Uzma Coppola MD   ipratropium (ATROVENT) 0.02 % nebulizer solution Take 2.5 mLs by nebulization 3 times daily 3/2/21   ANICETO Luke CNP   Handicap Placard MISC by Does not apply route Duration - 5 years 3/1/21   ANICETO Luke CNP   traZODone (DESYREL) 100 MG tablet Take 1 tablet by mouth nightly  Patient taking differently: Take 100 mg by mouth 2 times daily Am pm 3/1/21   ANICETO Luke CNP   sertraline (ZOLOFT) 100 MG tablet Take 1 tablet by mouth daily 3/1/21   ANICETO Luke CNP   acetaminophen (TYLENOL) 500 MG tablet Take 2 tablets by mouth 3 times daily for 10 days 21  Valarie Ronquillo MD   clopidogrel (PLAVIX) 75 MG tablet Take 1 tablet by mouth daily 21   Uzma Coppola MD   metoprolol tartrate (LOPRESSOR) 25 MG tablet Take 1 tablet by mouth 2 times daily Hold this medication for pulse <68 or systolic BP <062 66/36/78   ANICETO Luke CNP   ibuprofen (ADVIL;MOTRIN) 800 MG tablet Take 1 tablet by mouth 3 times daily 10/30/20   ANICETO Luke CNP   atorvastatin (LIPITOR) 80 MG tablet Take 1 tablet by mouth nightly 10/28/20   Uzma Coppola MD   aspirin 81 MG EC tablet Take 1 tablet by mouth daily 20   Hui Mcclure 04/19/21 0100    morphine (PF) injection 2 mg  2 mg Intravenous Q3H PRN Lori Haynes MD   2 mg at 04/17/21 1136    sertraline (ZOLOFT) tablet 100 mg  100 mg Oral Daily Lori Haynes MD   100 mg at 04/18/21 0835    traZODone (DESYREL) tablet 100 mg  100 mg Oral Nightly Lori Haynes MD   100 mg at 04/18/21 2048    acetaminophen (TYLENOL) tablet 650 mg  650 mg Oral Q4H PRN Barry Rothman MD           Allergies:     Allergies   Allergen Reactions    Dilaudid [Hydromorphone Hcl] Nausea And Vomiting    Codeine Itching       Problem List:    Patient Active Problem List   Diagnosis Code    Crush injury of right foot S97.81XA    Shortness of breath R06.02    Chest pain R07.9    Erectile dysfunction N52.9    Hyperglycemia R73.9    Anxiety F41.9    Depression F32.9    Tobacco abuse Z72.0    History of alcohol abuse F10.11    Fatigue R53.83    OANH (obstructive sleep apnea) G47.33    Hypersomnia G47.10    Chronic obstructive pulmonary disease (HCC) J44.9    Lumbar radiculopathy M54.16    HNP (herniated nucleus pulposus), lumbar M51.26    Spondylosis without myelopathy or radiculopathy, lumbar region M47.816    DDD (degenerative disc disease), lumbar M51.36    Lumbar spine pain M54.5    Acute respiratory distress R06.03    Class 3 severe obesity with body mass index (BMI) of 45.0 to 49.9 in adult ([facility]) E66.01, Z68.42    Pneumonia, primary atypical J18.9    Acute respiratory failure with hypoxia ([facility]) J96.01    Moderate protein-calorie malnutrition (HCC) E44.0    Acute respiratory failure (HCC) J96.00    Acute on chronic respiratory failure with hypoxemia ([facility]) J96.21    Acute diastolic congestive heart failure (HCC) I50.31    Hyperlipidemia E78.5    Insomnia G47.00    Morbid obesity with BMI of 45.0-49.9, adult ([facility]) E66.01, Z68.42    Abnormal cardiovascular stress test R94.39    Coronary artery disease involving native heart with angina pectoris ([facility]) I25.119    S/P three vessel coronary artery bypass Z95.1    Acute kidney injury ([facility] N17.9    Acute diverticulitis K57.92    Chronic combined systolic and diastolic congestive heart failure (HCC) I50.42    Second degree AV block, Mobitz type I I44.1    Asystole (HCC) I46.9    Ischemic cardiomyopathy I25.5    Diverticulitis of colon K57.32    Coronary artery disease involving native coronary artery of native heart with unstable angina pectoris (HCC) I25.110    CHB (complete heart block) ([facility]) I44.2    Sinus bradycardia R00.1       Past Medical History:        Diagnosis Date    Acute diverticulitis 04/10/2021    Acute kidney injury ([address] 04/08/2021    Acute on chronic respiratory failure with hypoxemia (HCC)     Acute respiratory failure (HCC) 08/19/2020    Acute respiratory failure with hypoxia (HCC)     Anxiety 01/06/2020    Aortic valve calcification 09/2020    seen on lung CT    CAD in native artery 04/14/2021    Chronic obstructive pulmonary disease ([facility] 09/03/2019    PFT done 9/03/19    Chronic systolic congestive heart failure ([address] 04/10/2021    Class 3 severe obesity with body mass index (BMI) of 45.0 to 49.9 in adult [facility])     Coronary artery calcification 09/2020    seen on lung ct    Coronary artery disease involving native heart with angina pectoris ([facility] 09/22/2020    Crush injury of right foot 07/23/2015    DDD (degenerative disc disease), lumbar 01/06/2020    Depression 01/06/2020    Diverticulitis of colon 04/10/2021    Erectile dysfunction 01/06/2020    Fatigue 01/06/2020    History of alcohol abuse 01/06/2020    History of drug use     HNP (herniated nucleus pulposus), lumbar 01/06/2020    Hyperglycemia 01/06/2020    Hyperlipidemia     Hypersomnia 01/06/2020    Insomnia     Ischemic cardiomyopathy     Kidney stone     Lumbar radiculopathy 01/06/2020    Lumbar spine pain 01/06/2020    LVH (left ventricular hypertrophy)     seen on echo 8/2020, moderate    Mobitz type I Louise atrioventricular block 04/10/2021    Moderate protein-calorie malnutrition ([facility] 2020    Observed sleep apnea 2020    OANH (obstructive sleep apnea) 2020    Pneumonia, primary atypical     Reactive depression 2020    S/P three vessel coronary artery bypass 10/26/2020    Spondylosis without myelopathy or radiculopathy, lumbar region 2020    Tobacco abuse 2020    Tobacco use 2020       Past Surgical History:        Procedure Laterality Date    CHOLECYSTECTOMY      CORONARY ARTERY BYPASS GRAFT N/A 2020    CORONARY ARTERY BYPASS GRAFTING X3, INTERNAL MAMMARY ARTERY, SAPHENOUS VEIN GRAFT, ON PUMP, STERNAL PLATING, 5 LEVEL BILATERAL INTERCOSTAL NERVE BLOCK, PLATELET GEL APPLICATION performed by Iesha Tesfaye MD at [address] [facility]  2011    cystoscopy left ureteroscopy, left retrograde, double J stent placement    FINGER AMPUTATION Right 2021    RIGHT MIDDLE FINGER IRRIGATION AND DEBRIDEMENT WITH REVISION AMPUTATION AND BONE SHORTENING, POSSIBLE NAIL ABLATION performed by Ramya Boyce MD at [address] [facility] Right 2019    RIGHT LUMBAR FIVE SACRAL ONE TRANSFORAMINAL EPIDURAL STEROID INJECTION SITE CONFIRMED BY FLUOROSCOPY performed by Kadeem Ross MD at [address] [facility] 2020    RIGHT LUMBAR FOUR AND LUMBAR FIVE TRANSFORAMINAL EPIDURAL STEROID INJECTION SITE CONFIRMED BY FLUOROSCOPY performed by Kadeem Ross MD at [place] [address]       Social History:    Social History     Tobacco Use    Smoking status: Former Smoker     Packs/day: 2.00     Years: 35.00     Pack years: 70.00     Types: Cigarettes     Quit date: 3/1/2020     Years since quittin.1    Smokeless tobacco: Never Used   Substance Use Topics    Alcohol use:  No                                Counseling given: Not Answered      Vital Signs (Current):   Vitals:    21 0006 21 5249 04/19/21 0600   BP: (!) 148/78 (!) 146/78  128/78   Pulse: 71 69  73   Resp: 18 17  17   Temp: 98 °F (36.7 °C) 97.6 °F (36.4 °C)  98.2 °F (36.8 °C)   TempSrc: Oral Oral  Oral   SpO2: 96% 94%  95%   Weight:   285 lb 9.6 oz (129.5 kg)    Height:                                                  BP Readings from Last 3 Encounters:   04/19/21 128/78   04/14/21 (!) 157/80   03/03/21 (!) 140/70       NPO Status:                                                                                 BMI:   Wt Readings from Last 3 Encounters:   04/19/21 285 lb 9.6 oz (129.5 kg)   04/14/21 283 lb 1.6 oz (128.4 kg)   03/03/21 297 lb 9.6 oz (135 kg)     Body mass index is 49.02 kg/m². CBC:   Lab Results   Component Value Date    WBC 10.0 04/19/2021    RBC 4.09 04/19/2021    HGB 12.0 04/19/2021    HCT 36.2 04/19/2021    MCV 88.4 04/19/2021    RDW 18.0 04/19/2021     04/19/2021       CMP:   Lab Results   Component Value Date     04/19/2021    K 4.2 04/19/2021     04/19/2021    CO2 27 04/19/2021    BUN 11 04/19/2021    CREATININE 0.8 04/19/2021    GFRAA >60 04/19/2021    GFRAA >60 03/16/2011    AGRATIO 1.3 04/08/2021    LABGLOM >60 04/19/2021    GLUCOSE 118 04/19/2021    PROT 6.3 04/09/2021    PROT 6.9 03/16/2011    CALCIUM 8.7 04/19/2021    BILITOT 0.5 04/09/2021    ALKPHOS 92 04/09/2021    AST 13 04/09/2021    ALT 16 04/09/2021       POC Tests: No results for input(s): POCGLU, POCNA, POCK, POCCL, POCBUN, POCHEMO, POCHCT in the last 72 hours.     Coags:   Lab Results   Component Value Date    PROTIME 12.7 10/19/2020    INR 1.10 10/19/2020    APTT 55.4 04/19/2021       HCG (If Applicable): No results found for: PREGTESTUR, PREGSERUM, HCG, HCGQUANT     ABGs:   Lab Results   Component Value Date    PHART 7.363 09/25/2020    PO2ART 146.4 09/25/2020    HAY4SJS 41.8 09/25/2020    PMS6NHY 23.8 09/25/2020    BEART -2 09/25/2020    X6SVUWTS 99 09/25/2020        Type & Screen (If Applicable):  No results found for: LABABO, [address]    Drug/Infectious Status (If Applicable):  No results found for: HIV, HEPCAB    COVID-19 Screening (If Applicable):   Lab Results   Component Value Date    COVID19 NOT DETECTED 2021    COVID19 NOT DETECTED 2020           Anesthesia Evaluation  Patient summary reviewed no history of anesthetic complications:   Airway: Mallampati: II  TM distance: >3 FB   Neck ROM: full  Comment: Large tongue  Mouth opening: > = 3 FB Dental: normal exam   (+) edentulous      Pulmonary:Negative Pulmonary ROS and normal exam  breath sounds clear to auscultation  (+) COPD:  sleep apnea:                             Cardiovascular:Negative CV ROS  Exercise tolerance: good (>4 METS),   (+) CAD: obstructive, CABG/stent:, CHF:,         Rhythm: regular  Rate: normal                    Neuro/Psych:   Negative Neuro/Psych ROS              GI/Hepatic/Renal: Neg GI/Hepatic/Renal ROS            Endo/Other: Negative Endo/Other ROS                    Abdominal:           Vascular: negative vascular ROS. Pre-Operative Diagnosis: CAD in native artery [I25.10]    64 y.o.   BMI:  Body mass index is 49.02 kg/m².      Vitals:    21 2035 21 0006 21 0418 21 0600   BP: (!) 148/78 (!) 146/78  128/78   Pulse: 71 69  73   Resp: 18 17  17   Temp: 98 °F (36.7 °C) 97.6 °F (36.4 °C)  98.2 °F (36.8 °C)   TempSrc: Oral Oral  Oral   SpO2: 96% 94%  95%   Weight:   285 lb 9.6 oz (129.5 kg)    Height:           Allergies   Allergen Reactions    Dilaudid [Hydromorphone Hcl] Nausea And Vomiting    Codeine Itching       Social History     Tobacco Use    Smoking status: Former Smoker     Packs/day: 2.00     Years: 35.00     Pack years: 70.00     Types: Cigarettes     Quit date: 3/1/2020     Years since quittin.1    Smokeless tobacco: Never Used   Substance Use Topics    Alcohol use: No       LABS:    CBC  Lab Results   Component Value Date/Time    WBC 10.0 2021 05:53 AM    HGB 12.0 (L) 04/19/2021 05:53 AM    HCT 36.2 (L) 04/19/2021 05:53 AM     04/19/2021 05:53 AM     RENAL  Lab Results   Component Value Date/Time     04/19/2021 05:53 AM    K 4.2 04/19/2021 05:53 AM     04/19/2021 05:53 AM    CO2 27 04/19/2021 05:53 AM    BUN 11 04/19/2021 05:53 AM    CREATININE 0.8 (L) 04/19/2021 05:53 AM    GLUCOSE 118 (H) 04/19/2021 05:53 AM     COAGS  Lab Results   Component Value Date/Time    PROTIME 12.7 10/19/2020 02:40 PM    INR 1.10 10/19/2020 02:40 PM    APTT 55.4 (H) 04/19/2021 05:53 AM     4/14/21 EKG  Normal sinus rhythmLow voltage in the limb leadsNonspecific T wave abnormalityBorderline ECGWhen compared with ECG of 08-APR-2021 18:15,No significant change was foundConfirmed by Kasie Bettencourt MD, Margy Mueller ([id number]) on 4/15/2021 7:18:19 PM    Echo 3/12/21   Summary   Limited echo for left ventricle systolic function. Definity is used for   myocardial border enhancement. LV systolic function is mildly reduced with a visually estimated EF=45-50%. The left ventricle is normal in size with normal wall thickness. More prominent Inferior HK on certain views. Indeterminate diastolic function. 4/14/21 cath  CORONARY ARTERIES     LM Proximal less than 10% stenosis, mid-distal haziness with   calcification and 70% eccentric stenosis.        LAD  Proximal-mid diffuse tubular 80% stenosis, there is   mid-distal bidirectional flow noted.  Distal vessel is visualized   on LIMA angiogram, this shows 60% distal stenosis. D1 is a large vessel with tortuosity noted and ostial/proximal   70% stenosis followed by mid vessel calcification with 50%   stenosis and mid-distal 99% stenosis.        LCX Medium to large size vessel, proximal 30 to 40% stenosis. OM 3 appears to be the largest OM and is 100% proximal-mid ,   distal vessel is seen through left to left collaterals and   appears to have less than 10% stenosis.      RCA Dominant, tortuous, proximal-mid 60 to 70% stenosis.      There are moderate right to left collaterals to the circumflex. BYPASS GRAFTS     LIMA-LAD Widely patent with less than 10% [id number]   stenosis, the distal LAD has stenosis as noted above.        SVG-D1  100% proximal/mid-distal         SVG-circumflex  100% [id number]           Findings and plans as noted below were discussed with the patient   and family, they understand/agree     Anesthesia Plan      general     ASA 4     (I discussed with the patient the risks and benefits of PIV, anesthesia, IV Narcotics, PACU. All questions were answered the patient agrees with the plan and wishes to proceed)  Induction: intravenous.   arterial line                        Otilia Grove MD   4/19/2021

## 2021-04-19 NOTE — FLOWSHEET NOTE
04/19/21 1433   Assessment   Charting Type Shift assessment   Neurological   Neuro (WDL) WDL   Level of Consciousness Alert (0)   Orientation Level Oriented X4   Cognition Appropriate judgement; Appropriate safety awareness; Appropriate attention/concentration; Appropriate for developmental age; Follows commands; No short term memory loss   Porfirio Coma Scale   Eye Opening 4   Best Verbal Response 5   Best Motor Response 6   [place] Coma Scale Score 15   NIH/MNHISS Stroke Scale   NIH/MNIHSS Stroke Scale Assessed No   HEENT   HEENT (WDL) X   Right Eye Intact; Impaired vision   Left Eye Intact; Impaired vision   Teeth Missing teeth   Respiratory   Respiratory (WDL) X   Respiratory Pattern Regular   Respiratory Depth Normal   Respiratory Quality/Effort Unlabored;Dyspnea with exertion   Chest Assessment Chest expansion symmetrical   Breath Sounds   Right Upper Lobe Clear   Right Middle Lobe Diminished   Right Lower Lobe Diminished   Left Upper Lobe Clear   Left Lower Lobe Diminished   Cardiac   Cardiac (WDL) X   Cardiac Regularity Regular   Heart Sounds S1, S2   Cardiac Rhythm NSR   Rhythm Interpretation   Pulse 64   Cardiac Monitor   Telemetry Monitor On Yes   Telemetry Audible Yes   Telemetry Alarms Set Yes   Telemetry Box Number 3   Gastrointestinal   Abdominal (WDL) WDL   RUQ Bowel Sounds Active   LUQ Bowel Sounds Active   RLQ Bowel Sounds Active   LLQ Bowel Sounds Active   Abdomen Inspection Rounded; Soft   Peripheral Vascular   Peripheral Vascular (WDL) X   RLE Edema Trace   LLE Edema Trace   Skin Color/Condition   Skin Color/Condition (WDL) WDL   Skin Integrity   Skin Integrity (WDL) WDL   Musculoskeletal   Musculoskeletal (WDL) WDL   RUE Full movement   LUE Full movement   RL Extremity Full movement   LL Extremity Full movement   Genitourinary   Genitourinary (WDL) WDL   Anus/Rectum   Anus/Rectum (WDL) WDL   Psychosocial   Psychosocial (WDL) WDL

## 2021-04-20 ENCOUNTER — TELEPHONE (OUTPATIENT)
Dept: CARDIOLOGY | Age: 61
End: 2021-04-20

## 2021-04-20 VITALS
HEART RATE: 78 BPM | DIASTOLIC BLOOD PRESSURE: 80 MMHG | RESPIRATION RATE: 18 BRPM | BODY MASS INDEX: 49.75 KG/M2 | SYSTOLIC BLOOD PRESSURE: 129 MMHG | WEIGHT: 291.4 LBS | HEIGHT: 64 IN | TEMPERATURE: 97.9 F | OXYGEN SATURATION: 98 %

## 2021-04-20 LAB
ANION GAP SERPL CALCULATED.3IONS-SCNC: 6 MMOL/L (ref 3–16)
BUN BLDV-MCNC: 9 MG/DL (ref 7–20)
CALCIUM SERPL-MCNC: 8.9 MG/DL (ref 8.3–10.6)
CHLORIDE BLD-SCNC: 104 MMOL/L (ref 99–110)
CO2: 29 MMOL/L (ref 21–32)
CREAT SERPL-MCNC: 0.8 MG/DL (ref 0.9–1.3)
EKG ATRIAL RATE: 77 BPM
EKG DIAGNOSIS: NORMAL
EKG P AXIS: 50 DEGREES
EKG P-R INTERVAL: 200 MS
EKG Q-T INTERVAL: 408 MS
EKG QRS DURATION: 80 MS
EKG QTC CALCULATION (BAZETT): 461 MS
EKG R AXIS: -25 DEGREES
EKG T AXIS: 77 DEGREES
EKG VENTRICULAR RATE: 77 BPM
GFR AFRICAN AMERICAN: >60
GFR NON-AFRICAN AMERICAN: >60
GLUCOSE BLD-MCNC: 141 MG/DL (ref 70–99)
HCT VFR BLD CALC: 35.4 % (ref 40.5–52.5)
HEMOGLOBIN: 11.6 G/DL (ref 13.5–17.5)
MCH RBC QN AUTO: 29 PG (ref 26–34)
MCHC RBC AUTO-ENTMCNC: 32.8 G/DL (ref 31–36)
MCV RBC AUTO: 88.4 FL (ref 80–100)
PDW BLD-RTO: 18.5 % (ref 12.4–15.4)
PLATELET # BLD: 168 K/UL (ref 135–450)
PMV BLD AUTO: 8.3 FL (ref 5–10.5)
POC ACT LR: 137 SEC
POC ACT LR: 154 SEC
POC ACT LR: 212 SEC
POC ACT LR: 230 SEC
POC ACT LR: 233 SEC
POC ACT LR: 254 SEC
POC ACT LR: 268 SEC
POC ACT LR: 275 SEC
POC ACT LR: 281 SEC
POC ACT LR: 290 SEC
POC ACT LR: 300 SEC
POC ACT LR: 309 SEC
POC ACT LR: 309 SEC
POC ACT LR: 311 SEC
POC ACT LR: 321 SEC
POTASSIUM REFLEX MAGNESIUM: 3.7 MMOL/L (ref 3.5–5.1)
POTASSIUM SERPL-SCNC: 3.7 MMOL/L (ref 3.5–5.1)
RBC # BLD: 4 M/UL (ref 4.2–5.9)
SODIUM BLD-SCNC: 139 MMOL/L (ref 136–145)
WBC # BLD: 12.3 K/UL (ref 4–11)

## 2021-04-20 PROCEDURE — 2500000003 HC RX 250 WO HCPCS: Performed by: INTERNAL MEDICINE

## 2021-04-20 PROCEDURE — 2580000003 HC RX 258: Performed by: INTERNAL MEDICINE

## 2021-04-20 PROCEDURE — 80048 BASIC METABOLIC PNL TOTAL CA: CPT

## 2021-04-20 PROCEDURE — 85027 COMPLETE CBC AUTOMATED: CPT

## 2021-04-20 PROCEDURE — 97530 THERAPEUTIC ACTIVITIES: CPT

## 2021-04-20 PROCEDURE — 6370000000 HC RX 637 (ALT 250 FOR IP): Performed by: INTERNAL MEDICINE

## 2021-04-20 PROCEDURE — 93005 ELECTROCARDIOGRAM TRACING: CPT | Performed by: INTERNAL MEDICINE

## 2021-04-20 PROCEDURE — 97535 SELF CARE MNGMENT TRAINING: CPT

## 2021-04-20 PROCEDURE — 93010 ELECTROCARDIOGRAM REPORT: CPT | Performed by: INTERNAL MEDICINE

## 2021-04-20 PROCEDURE — 99232 SBSQ HOSP IP/OBS MODERATE 35: CPT | Performed by: NURSE PRACTITIONER

## 2021-04-20 PROCEDURE — 97165 OT EVAL LOW COMPLEX 30 MIN: CPT

## 2021-04-20 RX ORDER — FUROSEMIDE 80 MG
40 TABLET ORAL DAILY
Qty: 45 TABLET | Refills: 3 | Status: SHIPPED
Start: 2021-04-20 | End: 2021-12-14

## 2021-04-20 RX ORDER — METOPROLOL SUCCINATE 25 MG/1
25 TABLET, EXTENDED RELEASE ORAL DAILY
Qty: 30 TABLET | Refills: 11 | Status: SHIPPED | OUTPATIENT
Start: 2021-04-20 | End: 2022-03-30 | Stop reason: ALTCHOICE

## 2021-04-20 RX ADMIN — Medication 10 ML: at 08:51

## 2021-04-20 RX ADMIN — ASPIRIN 81 MG: 81 TABLET, COATED ORAL at 08:50

## 2021-04-20 RX ADMIN — CLOPIDOGREL BISULFATE 75 MG: 75 TABLET ORAL at 08:50

## 2021-04-20 RX ADMIN — METOPROLOL TARTRATE 25 MG: 25 TABLET, FILM COATED ORAL at 08:50

## 2021-04-20 RX ADMIN — METRONIDAZOLE 500 MG: 500 INJECTION, SOLUTION INTRAVENOUS at 00:27

## 2021-04-20 RX ADMIN — ACETAMINOPHEN 650 MG: 325 TABLET ORAL at 10:58

## 2021-04-20 RX ADMIN — SERTRALINE 100 MG: 50 TABLET, FILM COATED ORAL at 08:50

## 2021-04-20 RX ADMIN — METRONIDAZOLE 500 MG: 500 INJECTION, SOLUTION INTRAVENOUS at 08:50

## 2021-04-20 ASSESSMENT — PAIN DESCRIPTION - ORIENTATION: ORIENTATION: LEFT

## 2021-04-20 ASSESSMENT — PAIN DESCRIPTION - LOCATION: LOCATION: GROIN

## 2021-04-20 ASSESSMENT — ENCOUNTER SYMPTOMS
GASTROINTESTINAL NEGATIVE: 1
RESPIRATORY NEGATIVE: 1

## 2021-04-20 ASSESSMENT — PAIN SCALES - GENERAL: PAINLEVEL_OUTOF10: 5

## 2021-04-20 ASSESSMENT — PAIN DESCRIPTION - DESCRIPTORS: DESCRIPTORS: ACHING

## 2021-04-20 NOTE — PLAN OF CARE
Problem: Pain:  Goal: Pain level will decrease  Description: Pain level will decrease  Outcome: Completed  Goal: Control of acute pain  Description: Control of acute pain  Outcome: Completed  Goal: Control of chronic pain  Description: Control of chronic pain  Outcome: Completed     Problem: Falls - Risk of:  Goal: Will remain free from falls  Description: Will remain free from falls  4/20/2021 1227 by Benson Gandhi RN  Outcome: Completed  4/20/2021 0158 by Ovi Sheppard RN  Outcome: Ongoing  Goal: Absence of physical injury  Description: Absence of physical injury  Outcome: Completed

## 2021-04-20 NOTE — PROGRESS NOTES
Physical Therapy    PT order received, chart reviewed. Per evaluating OT, pt is completely safe and (I) with all functional mobility tasks, including ambulation. Pt denies PT needs at this time and nursing denies PT needs as well. Will sign off. Thank you.     Barb Booth  PT, DPT #[id number]

## 2021-04-20 NOTE — ANESTHESIA POSTPROCEDURE EVALUATION
Department of Anesthesiology  Postprocedure Note    Patient: Amna Shen  MRN: [id number]  YOB: 1960  Date of evaluation: 4/19/2021  Time:  8:54 PM     Procedure Summary     Date: 04/19/21 Room / Location: [facility] Cardiac Cath Lab    Anesthesia Start: [address] [facility] Anesthesia Stop: 1253    Procedure: PTCA/STENT Diagnosis:     Scheduled Providers:  Responsible Provider: Librado Brown MD    Anesthesia Type: general ASA Status: 4          Anesthesia Type: general    Adele Phase I: Adele Score: 9    Adele Phase II:      Last vitals: Reviewed and per EMR flowsheets. Anesthesia Post Evaluation    Patient location during evaluation: PACU  Patient participation: complete - patient participated  Level of consciousness: awake and alert  Airway patency: patent  Nausea & Vomiting: no nausea and no vomiting  Complications: no  Cardiovascular status: blood pressure returned to baseline  Respiratory status: acceptable  Hydration status: euvolemic  Comments: VSS on transfer to phase 2 recovery. No anesthetic complications.

## 2021-04-20 NOTE — PROGRESS NOTES
Occupational Therapy   Occupational Therapy Initial Assessment, treatment and discharge  Date: 2021   Patient Name: Roark Klinefelter  MRN: [id number]     : 1960    Date of Service: 2021    Discharge Recommendations:  Home with assist PRN       Assessment   Assessment: pt admitted with CAD, underwent impella assisted rotablator and ISREAL Diag and LM/LAD on 21. Pt currently independent and denies needs. No DME needs, as pt independent. OT signing off. Prognosis: Good  OT Education: OT Role;Plan of Care;ADL Adaptive Strategies;Transfer Training  Patient Education: disease specific ed: EC, pacing self with activity  REQUIRES OT FOLLOW UP: No  Activity Tolerance  Activity Tolerance: Patient Tolerated treatment well  Activity Tolerance: SPO2 97% on RA after mobility  Safety Devices  Safety Devices in place: Yes  Type of devices: Call light within reach; Left in chair(no alarms per RN)           Patient Diagnosis(es): The encounter diagnosis was CHB (complete heart block) ([address]      has a past medical history of Acute diverticulitis, Acute kidney injury ([address] Acute on chronic respiratory failure with hypoxemia ([address] Acute respiratory failure ([address] Acute respiratory failure with hypoxia ([address] Anxiety, Aortic valve calcification, CAD in native artery, Chronic obstructive pulmonary disease (HCC), Chronic systolic congestive heart failure (HCC), Class 3 severe obesity with body mass index (BMI) of 45.0 to 49.9 in [facility]), Coronary artery calcification, Coronary artery disease involving native heart with angina pectoris ([address] Crush injury of right foot, DDD (degenerative disc disease), lumbar, Depression, Diverticulitis of colon, Erectile dysfunction, Fatigue, History of alcohol abuse, History of drug use, HNP (herniated nucleus pulposus), lumbar, Hyperglycemia, Hyperlipidemia, Hypersomnia, Insomnia, Ischemic cardiomyopathy, Kidney stone, Lumbar radiculopathy, Lumbar spine pain, LVH (left ventricular hypertrophy), Mobitz type I Wenckebach atrioventricular block, Moderate protein-calorie malnutrition ([facility] Observed sleep apnea, OANH (obstructive sleep apnea), Pneumonia, primary atypical, Reactive depression, S/P three vessel coronary artery bypass, Spondylosis without myelopathy or radiculopathy, lumbar region, Tobacco abuse, and Tobacco use.   has a past surgical history that includes Cholecystectomy; Cystoscopy (03/16/2011); Pain management procedure (Right, 12/24/2019); Pain management procedure (Right, 1/7/2020); Coronary artery bypass graft (N/A, 9/25/2020); and Finger amputation (Right, 2/2/2021).            Restrictions  Position Activity Restriction  Other position/activity restrictions: tele, IV, L UE PICC, up as tolerated    Subjective   General  Chart Reviewed: Yes  Family / Caregiver Present: No  Referring Practitioner: Amor Davis MD  Diagnosis: CAD  Subjective  Subjective: Pt in chair, agreeable  General Comment  Comments: RN clears for therapy  Patient Currently in Pain: Yes(reports groin incisions are \"sore\" but does not rate pain)  Pain Assessment  Pain Level: 5  Pain Type: Surgical pain  Pain Location: Groin  Pain Orientation: Left  Pain Descriptors: Aching  Vital Signs  Patient Currently in Pain: Yes(reports groin incisions are \"sore\" but does not rate pain)  Social/Functional History  Social/Functional History  Lives With: Spouse  Type of Home: House  Home Layout: One level  Home Access: Ramped entrance  Bathroom Shower/Tub: Walk-in shower  Bathroom Toilet: Standard  Bathroom Equipment: Grab bars in shower, Shower chair  Home Equipment: Cane, Rolling walker(does not use)  ADL Assistance: Independent  Homemaking Assistance: (wife does house work)  Homemaking Responsibilities: No  Ambulation Assistance: Independent  Transfer Assistance: Independent  Active : Yes  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       Objective   Vision: Impaired  Vision Exceptions: Wears glasses at all times(contacts)  Hearing: Within functional limits    Orientation  Overall Orientation Status: Within Functional Limits     Balance  Sitting Balance: Independent  Standing Balance: Independent  Standing Balance  Time: 2-3 min  Activity: amb in room  Functional Mobility  Functional - Mobility Device: No device  Activity: Other; To/from bathroom  Assist Level: Independent  Toilet Transfers  Toilet - Technique: Ambulating  Equipment Used: Standard toilet  Toilet Transfer: Independent  ADL  Feeding: Independent  Grooming: Independent  Toileting: Independent  Additional Comments: pt reports he wears slip on shoes at home, has some difficulty with socks d/t groin incisions  Tone RUE  RUE Tone: Normotonic  Tone LUE  LUE Tone: Normotonic  Coordination  Movements Are Fluid And Coordinated: Yes        Transfers  Sit to stand: Independent  Stand to sit: Independent     Cognition  Overall Cognitive Status: [facility]  Cognition Comment: talkative, but pleasant and agreeable                 LUE AROM (degrees)  LUE AROM : WFL  Left Hand AROM (degrees)  Left Hand AROM: WFL  RUE AROM (degrees)  RUE AROM : WFL  Right Hand AROM (degrees)  Right Hand AROM: WFL  LUE Strength  Gross LUE Strength: WFL  RUE Strength  Gross RUE Strength: WFL                   Plan   Plan  Times per week: Eval only      AM-PAC Score        AM-PAC Inpatient Daily Activity Raw Score: 22 (04/20/21 1139)  AM-PAC Inpatient ADL T-Scale Score : 47.1 (04/20/21 1139)  ADL Inpatient CMS 0-100% Score: 25.8 (04/20/21 1139)  ADL Inpatient CMS G-Code Modifier : Rondall Forward (04/20/21 1139)    Goals  Short term goals  Time Frame for Short term goals: 1 visit  Short term goal 1: Pt will complete transfers and toileting independently-MET 4/20  Patient Goals   Patient goals :  \"Go home\"       Therapy Time   Individual Concurrent Group Co-treatment   Time In 9935         Time Out 1127         Minutes 34         Timed Code Treatment Minutes: 24 Minutes(10 min eval)       Roseanna Ponce, OT

## 2021-04-20 NOTE — PROGRESS NOTES
[facility]  Cardiology  Progress Note    Admission date:  2021    Reason for follow up visit: CAD    HPI/CC: Terry Burden is a 64 y.o. male who was admitted 2021 to [facility] for shortness of breath. Stress test abnormal. [facility] 2021 showed occluded SVGs. On 2021 he had impella assisted rotablator and ISREAL Diag and LM/LAD. Also noted to have intermittent Mobitz 1, monitor at discharge. Rhythm overnight has been sinus. Subjective: Feels great today, best he has felt in 10 years. Denies chest pain, shortness of breath, palpitations and dizziness. Kevon Keith to go home. Vitals:  Blood pressure 117/70, pulse 80, temperature 97.8 °F (36.6 °C), temperature source Oral, resp. rate 18, height 5' 4\" (1.626 m), weight 291 lb 6.4 oz (132.2 kg), SpO2 96 %.   Temp  Av.7 °F (36.5 °C)  Min: 96.3 °F (35.7 °C)  Max: 98.6 °F (37 °C)  Pulse  Av.9  Min: 64  Max: 82  BP  Min: 69/46  Max: 160/76  SpO2  Av.7 %  Min: 94 %  Max: 100 %  FiO2   Av.2 %  Min: 74 %  Max: 100 %    24 hour I/O    Intake/Output Summary (Last 24 hours) at 2021 3484  Last data filed at 2021 0601  Gross per 24 hour   Intake 2120 ml   Output 3425 ml   Net -1305 ml     Current Facility-Administered Medications   Medication Dose Route Frequency Provider Last Rate Last Admin    meperidine (DEMEROL) injection 12.5 mg  12.5 mg Intravenous Q5 Min PRN Lexi Marks MD        HYDROmorphone (DILAUDID) injection 0.25 mg  0.25 mg Intravenous Q5 Min PRN Lexi Marks MD        HYDROmorphone (DILAUDID) injection 0.5 mg  0.5 mg Intravenous Q5 Min PRN Lexi Marks MD   0.5 mg at 21 1333    ondansetron (ZOFRAN) injection 4 mg  4 mg Intravenous PRN Lexi Marks MD        promethazine (PHENERGAN) injection 6.25 mg  6.25 mg Intravenous Q15 Min PRN Lexi Marks MD        labetalol (NORMODYNE;TRANDATE) injection 5 mg  5 mg Intravenous Q10 Min PRN Lexi Marks MD        hydrALAZINE (APRESOLINE) injection 5 mg  5 mg Nebulization Q6H PRN Hayley Boyle MD        aspirin EC tablet 81 mg  81 mg Oral Daily Hayley Boyle MD   81 mg at 04/20/21 0850    atorvastatin (LIPITOR) tablet 80 mg  80 mg Oral Nightly Hayley Boyle MD   80 mg at 04/19/21 2105    cefTRIAXone (ROCEPHIN) 1000 mg IVPB in 50 mL D5W minibag  1,000 mg Intravenous Q24H Hayley Boyle MD   Stopped at 04/19/21 2134    clopidogrel (PLAVIX) tablet 75 mg  75 mg Oral Daily Hayley Boyle MD   75 mg at 04/20/21 0850    ipratropium (ATROVENT) 0.02 % nebulizer solution 0.5 mg  0.5 mg Nebulization TID PRN Hayley Boyle MD        metoprolol tartrate (LOPRESSOR) tablet 25 mg  25 mg Oral BID Hayley Boyle MD   25 mg at 04/20/21 0850    metronidazole (FLAGYL) 500 mg in NaCl 100 mL IVPB premix  500 mg Intravenous Q8H Hayley Boyle  mL/hr at 04/20/21 0850 500 mg at 04/20/21 0850    morphine (PF) injection 2 mg  2 mg Intravenous Q3H PRN Hayley Boyle MD   2 mg at 04/17/21 1136    sertraline (ZOLOFT) tablet 100 mg  100 mg Oral Daily Hayley Boyle MD   100 mg at 04/20/21 0850    traZODone (DESYREL) tablet 100 mg  100 mg Oral Nightly Hayley Boyle MD   100 mg at 04/19/21 2105    acetaminophen (TYLENOL) tablet 650 mg  650 mg Oral Q4H PRN Jhoana Shelton MD         Review of Systems   Constitutional: Negative. Respiratory: Negative. Cardiovascular: Negative. Gastrointestinal: Negative. Neurological: Negative.       Objective:     Telemetry monitor: SR    Physical Exam:  Constitutional:  Comfortable and alert, NAD, appears stated age, obese  Eyes: PERRL, sclera nonicteric  Neck:  Supple, no masses, no thyroidmegaly, no JVD  Skin:  Warm and dry; no rash or lesions  Heart:  Regular, normal apex, S1 and S2 normal, no M/G/R  Lungs:  Normal respiratory effort; clear; no wheezing/rhonchi/rales  Abdomen: soft, non tender, + bowel sounds  Extremities: Trace BLE edema  Neuro: alert and oriented, moves legs and arms equally, normal mood and affect  Right radial site soft, no hematoma, 2+ pulse  Right femoral site soft, no hematoma, 2+ R femoral pulse, 2+ R DP/PT pulse  Left femoral site soft, no hematoma, 2+ L femoral pulse, 2+ L DP/PT pulse    Data Reviewed:    Coronary angiogram 4/19/2021:  Unstable angina  PROCEDURES PERFORMED    Left heart catheterization  Coronary angiogam  Coronary cath  Temporary transvenous pacer insertion and removal  Insertion of short term external heart assist system into heart, intraoperative, percutaneous approach  Assistance with cardiac output using impeller pump, continuous  IVUS of left main  IVUS of LAD  IVUS of D1  Rotational atherectomy of left main  Rotational atherectomy of LAD  Rotational atherectomy of D1  PCI of D1 with single drug-eluting stent  PCI of left main/LAD with single drug-eluting stent   PROCEDURE DESCRIPTION   Risks/benefits/alternatives/outcomes were discussed with patient and/or family and informed consent was obtained. Using the [facility] scale, the patient's right radial artery was found to be a level B. Patient was prepped draped in the usual sterile fashion. Local anaesthetic was applied over puncture site. Using a back wall technique and ultrasound guidance, a 6 Upper sorbian Terumo sheath was inserted into right radial artery. Verapamil, nitroglycerin, nicardipine were administered through the sheath. Using fluoroscopic and ultrasound guidance, an 8 Bulgarian sheath was inserted into the right common femoral vein. A temporary transvenous pacer was inserted and capture was demonstrated and this was placed in backup mode during the procedure. Temporary transvenous pacer was removed at the end of the case and the venous sheath was sutured in place for later removal.  Using similar technique, a 6 Bulgarian sheath was placed into the left common femoral artery. A single Perclose device was then inserted using preclosed technique and then the site was dilated with 10 and 12 Western Janis dilators and a an Impella CP sheath was placed.   Using angiograms. Pigtail was used for left-ventricular angiogram and aortogram.  Ultra was used for RCA angiography as well as saphenous vein graft angiography. Murali Herber was used for left main cannulation as well as to cannulate the left subclavian which was then ultimately exchanged out for a B-C catheter over a Glidewire. B-C catheter was used for LIMA angiography. At the conclusion of the procedure, a TR band was placed over the puncture site and hemostasis was obtained. There were no immediate complications. I supervised sedation with versed 2 mg/fentanyl 25 mcg during the procedure. 90 cc contrast was utilized. <20cc EBL. FINDINGS     LVEDP  6   GRADIENT ACROSS AORTIC VALVE  none   LV FUNCTION EF 60%   WALL MOTION  normal   MITRAL REGURGITATION  mild     LM Proximal less than 10% stenosis, mid-distal haziness with calcification and 70% eccentric stenosis.       LAD  Proximal-mid diffuse tubular 80% stenosis, there is mid-distal bidirectional flow noted. Distal vessel is visualized on LIMA angiogram, this shows 60% distal stenosis.     D1 is a large vessel with tortuosity noted and ostial/proximal 70% stenosis followed by mid vessel calcification with 50% stenosis and mid-distal 99% stenosis.       LCX Medium to large size vessel, proximal 30 to 40% stenosis.     OM 3 appears to be the largest OM and is 100% proximal-mid , distal vessel is seen through left to left collaterals and appears to have less than 10% stenosis.       RCA Dominant, tortuous, proximal-mid 60 to 70% stenosis.     There are moderate right to left collaterals to the circumflex.      LIMA-LAD Widely patent with less than 10% [id number] stenosis, the distal LAD has stenosis as noted above.         SVG-D1  100% proximal/mid-distal          SVG-circumflex  100% [id number]           Findings and plans as noted below were discussed with the patient and family, they understand/agree  CONCLUSIONS:    2 out of 3 bypass grafts are occluded, the LIMA to LAD graft is patent  We will consult with CT surgery regarding options for revascularization which potentially may include redo CABG versus high risk PCI of the left main into diagonal  Will order CTA abd/bilat LE w/ runoff, in case cardiac support devices are needed    Echo 3/12/2021:   Limited echo for left ventricle systolic function. Definity is used for   myocardial border enhancement. LV systolic function is mildly reduced with a visually estimated EF=45-50%. The left ventricle is normal in size with normal wall thickness. More prominent Inferior HK on certain views. Indeterminate diastolic function.     Lab Reviewed:     Renal Profile:  Lab Results   Component Value Date    CREATININE 0.8 04/20/2021    BUN 9 04/20/2021     04/20/2021    K 3.7 04/20/2021    K 3.7 04/20/2021     04/20/2021    CO2 29 04/20/2021     CBC:    Lab Results   Component Value Date    WBC 12.3 04/20/2021    RBC 4.00 04/20/2021    HGB 11.6 04/20/2021    HCT 35.4 04/20/2021    MCV 88.4 04/20/2021    RDW 18.5 04/20/2021     04/20/2021     BNP:    Lab Results   Component Value Date    PROBNP 147 01/13/2021    PROBNP 236 10/19/2020    PROBNP 94 09/18/2020    PROBNP 177 08/19/2020    PROBNP 209 04/04/2020     Fasting Lipid Panel:    Lab Results   Component Value Date    CHOL 85 09/25/2020    HDL 35 01/13/2021    TRIG 173 09/25/2020     Cardiac Enzymes:  CK/MbTroponin  Lab Results   Component Value Date    TROPONINI <0.01 04/15/2021     PT/ INR   Lab Results   Component Value Date    INR 1.10 10/19/2020    INR 1.35 09/26/2020    INR 1.16 09/25/2020    PROTIME 12.7 10/19/2020    PROTIME 15.7 09/26/2020    PROTIME 13.5 09/25/2020     PTT No results found for: PTT   Lab Results   Component Value Date    MG 2.30 09/29/2020      Lab Results   Component Value Date    TSH 0.90 07/10/2019     All labs and imaging reviewed today    Assessment:  CAD: no angina; s/p impella assisted rotablator and ISREAL LM/LAD and Diag 4/19/2021; s/p CABG x3 9/2020   - medical management for LCx   Ischemic cardiomyopathy: EF 45-50% on echo 0/2227  Chronic systolic CHF: appears compensated though does have weight gain  Bradycardia/interrmittent Olvin 1: stable  HTN: controlled  HLD: controlled, LDL 33, continue statin  OANH  COPD  Obesity  Intolerant to ACE    Plan:   1. Change lopressor to toprol due to cardiomyopathy  2. Activity restrictions reviewed  3. Lisinopril stopped due to cough, considering adding ARB in follow up for cardiomyopathy  4. Restart lasix 40 mg daily tomorrow  5. Continue aspirin, statin, plavix  6. Cardiac monitor at discharge due to intermittent AV block and follow up as planned with EP  7. Check BP at home and call the office if consistently out of goal range  8. Ok to discharge from cardiology perspective, please call with any questions. Will arrange follow up with Dr. Jessica Ramon 4/28/2021 at [facility]. Patient is eager to return to work as a  and will assess ability to return at follow up.      Shazia Borrego, APRN-CNP  [employer]  [phone]

## 2021-04-21 ENCOUNTER — HOSPITAL ENCOUNTER (INPATIENT)
Age: 61
LOS: 2 days | Discharge: HOME | DRG: 315 | End: 2021-04-23
Attending: EMERGENCY MEDICINE | Admitting: INTERNAL MEDICINE
Payer: COMMERCIAL

## 2021-04-21 ENCOUNTER — APPOINTMENT (OUTPATIENT)
Dept: CT IMAGING | Age: 61
DRG: 315 | End: 2021-04-21
Payer: COMMERCIAL

## 2021-04-21 ENCOUNTER — APPOINTMENT (OUTPATIENT)
Dept: VASCULAR LAB | Age: 61
DRG: 315 | End: 2021-04-21
Payer: COMMERCIAL

## 2021-04-21 ENCOUNTER — TELEPHONE (OUTPATIENT)
Dept: FAMILY MEDICINE CLINIC | Age: 61
End: 2021-04-21

## 2021-04-21 ENCOUNTER — APPOINTMENT (OUTPATIENT)
Dept: GENERAL RADIOLOGY | Age: 61
DRG: 315 | End: 2021-04-21
Payer: COMMERCIAL

## 2021-04-21 DIAGNOSIS — I82.622 ACUTE DEEP VEIN THROMBOSIS (DVT) OF LEFT UPPER EXTREMITY, UNSPECIFIED VEIN (HCC): Primary | ICD-10-CM

## 2021-04-21 PROBLEM — L03.90 CELLULITIS: Status: ACTIVE | Noted: 2021-04-21

## 2021-04-21 PROBLEM — L03.313 CELLULITIS OF CHEST WALL: Status: ACTIVE | Noted: 2021-04-21

## 2021-04-21 LAB
A/G RATIO: 1.1 (ref 1.1–2.2)
ALBUMIN SERPL-MCNC: 3.1 G/DL (ref 3.4–5)
ALP BLD-CCNC: 70 U/L (ref 40–129)
ALT SERPL-CCNC: 18 U/L (ref 10–40)
ANION GAP SERPL CALCULATED.3IONS-SCNC: 7 MMOL/L (ref 3–16)
AST SERPL-CCNC: 19 U/L (ref 15–37)
BASOPHILS ABSOLUTE: 0.2 K/UL (ref 0–0.2)
BASOPHILS RELATIVE PERCENT: 1.8 %
BILIRUB SERPL-MCNC: <0.2 MG/DL (ref 0–1)
BUN BLDV-MCNC: 13 MG/DL (ref 7–20)
CALCIUM SERPL-MCNC: 8.6 MG/DL (ref 8.3–10.6)
CHLORIDE BLD-SCNC: 103 MMOL/L (ref 99–110)
CO2: 28 MMOL/L (ref 21–32)
CREAT SERPL-MCNC: 0.8 MG/DL (ref 0.9–1.3)
D DIMER: 505 NG/ML DDU (ref 0–229)
EKG ATRIAL RATE: 98 BPM
EKG DIAGNOSIS: NORMAL
EKG P AXIS: -15 DEGREES
EKG P-R INTERVAL: 168 MS
EKG Q-T INTERVAL: 350 MS
EKG QRS DURATION: 74 MS
EKG QTC CALCULATION (BAZETT): 446 MS
EKG R AXIS: -51 DEGREES
EKG T AXIS: 46 DEGREES
EKG VENTRICULAR RATE: 98 BPM
EOSINOPHILS ABSOLUTE: 0.3 K/UL (ref 0–0.6)
EOSINOPHILS RELATIVE PERCENT: 2.4 %
GFR AFRICAN AMERICAN: >60
GFR NON-AFRICAN AMERICAN: >60
GLOBULIN: 2.7 G/DL
GLUCOSE BLD-MCNC: 108 MG/DL (ref 70–99)
HCT VFR BLD CALC: 38.3 % (ref 40.5–52.5)
HEMOGLOBIN: 12.6 G/DL (ref 13.5–17.5)
LACTIC ACID, SEPSIS: 1.2 MMOL/L (ref 0.4–1.9)
LYMPHOCYTES ABSOLUTE: 1.6 K/UL (ref 1–5.1)
LYMPHOCYTES RELATIVE PERCENT: 14.1 %
MCH RBC QN AUTO: 29.4 PG (ref 26–34)
MCHC RBC AUTO-ENTMCNC: 32.8 G/DL (ref 31–36)
MCV RBC AUTO: 89.6 FL (ref 80–100)
MONOCYTES ABSOLUTE: 0.8 K/UL (ref 0–1.3)
MONOCYTES RELATIVE PERCENT: 7.2 %
NEUTROPHILS ABSOLUTE: 8.5 K/UL (ref 1.7–7.7)
NEUTROPHILS RELATIVE PERCENT: 74.5 %
PDW BLD-RTO: 18.2 % (ref 12.4–15.4)
PLATELET # BLD: 175 K/UL (ref 135–450)
PMV BLD AUTO: 8.2 FL (ref 5–10.5)
POTASSIUM REFLEX MAGNESIUM: 3.6 MMOL/L (ref 3.5–5.1)
PRO-BNP: 522 PG/ML (ref 0–124)
RBC # BLD: 4.28 M/UL (ref 4.2–5.9)
SARS-COV-2, NAAT: NOT DETECTED
SODIUM BLD-SCNC: 138 MMOL/L (ref 136–145)
TOTAL PROTEIN: 5.8 G/DL (ref 6.4–8.2)
TROPONIN: <0.01 NG/ML
WBC # BLD: 11.4 K/UL (ref 4–11)

## 2021-04-21 PROCEDURE — 93005 ELECTROCARDIOGRAM TRACING: CPT

## 2021-04-21 PROCEDURE — 99284 EMERGENCY DEPT VISIT MOD MDM: CPT

## 2021-04-21 PROCEDURE — 80053 COMPREHEN METABOLIC PANEL: CPT

## 2021-04-21 PROCEDURE — 84484 ASSAY OF TROPONIN QUANT: CPT

## 2021-04-21 PROCEDURE — 6360000004 HC RX CONTRAST MEDICATION: Performed by: EMERGENCY MEDICINE

## 2021-04-21 PROCEDURE — 93971 EXTREMITY STUDY: CPT

## 2021-04-21 PROCEDURE — 96365 THER/PROPH/DIAG IV INF INIT: CPT

## 2021-04-21 PROCEDURE — 83880 ASSAY OF NATRIURETIC PEPTIDE: CPT

## 2021-04-21 PROCEDURE — 2580000003 HC RX 258: Performed by: EMERGENCY MEDICINE

## 2021-04-21 PROCEDURE — 1200000000 HC SEMI PRIVATE

## 2021-04-21 PROCEDURE — 85379 FIBRIN DEGRADATION QUANT: CPT

## 2021-04-21 PROCEDURE — 85025 COMPLETE CBC W/AUTO DIFF WBC: CPT

## 2021-04-21 PROCEDURE — 6360000002 HC RX W HCPCS: Performed by: EMERGENCY MEDICINE

## 2021-04-21 PROCEDURE — 6360000002 HC RX W HCPCS: Performed by: NURSE PRACTITIONER

## 2021-04-21 PROCEDURE — 93010 ELECTROCARDIOGRAM REPORT: CPT | Performed by: INTERNAL MEDICINE

## 2021-04-21 PROCEDURE — 71045 X-RAY EXAM CHEST 1 VIEW: CPT

## 2021-04-21 PROCEDURE — 6370000000 HC RX 637 (ALT 250 FOR IP): Performed by: EMERGENCY MEDICINE

## 2021-04-21 PROCEDURE — 6370000000 HC RX 637 (ALT 250 FOR IP): Performed by: NURSE PRACTITIONER

## 2021-04-21 PROCEDURE — 2580000003 HC RX 258: Performed by: NURSE PRACTITIONER

## 2021-04-21 PROCEDURE — 83605 ASSAY OF LACTIC ACID: CPT

## 2021-04-21 PROCEDURE — 87635 SARS-COV-2 COVID-19 AMP PRB: CPT

## 2021-04-21 PROCEDURE — 6370000000 HC RX 637 (ALT 250 FOR IP): Performed by: PHYSICIAN ASSISTANT

## 2021-04-21 PROCEDURE — 71275 CT ANGIOGRAPHY CHEST: CPT

## 2021-04-21 PROCEDURE — 99222 1ST HOSP IP/OBS MODERATE 55: CPT | Performed by: PHYSICIAN ASSISTANT

## 2021-04-21 PROCEDURE — 87040 BLOOD CULTURE FOR BACTERIA: CPT

## 2021-04-21 RX ORDER — ACETAMINOPHEN 650 MG/1
650 SUPPOSITORY RECTAL EVERY 6 HOURS PRN
Status: DISCONTINUED | OUTPATIENT
Start: 2021-04-21 | End: 2021-04-23 | Stop reason: HOSPADM

## 2021-04-21 RX ORDER — ALBUTEROL SULFATE 2.5 MG/3ML
2.5 SOLUTION RESPIRATORY (INHALATION) EVERY 4 HOURS PRN
Status: DISCONTINUED | OUTPATIENT
Start: 2021-04-21 | End: 2021-04-23 | Stop reason: HOSPADM

## 2021-04-21 RX ORDER — ACETAMINOPHEN 325 MG/1
650 TABLET ORAL ONCE
Status: DISCONTINUED | OUTPATIENT
Start: 2021-04-21 | End: 2021-04-23 | Stop reason: HOSPADM

## 2021-04-21 RX ORDER — ALBUTEROL SULFATE 2.5 MG/3ML
2.5 SOLUTION RESPIRATORY (INHALATION) EVERY 6 HOURS PRN
Status: DISCONTINUED | OUTPATIENT
Start: 2021-04-21 | End: 2021-04-21

## 2021-04-21 RX ORDER — FUROSEMIDE 40 MG/1
40 TABLET ORAL DAILY
Status: DISCONTINUED | OUTPATIENT
Start: 2021-04-21 | End: 2021-04-23 | Stop reason: HOSPADM

## 2021-04-21 RX ORDER — HYDROCODONE BITARTRATE AND ACETAMINOPHEN 5; 325 MG/1; MG/1
1 TABLET ORAL EVERY 6 HOURS PRN
Status: DISCONTINUED | OUTPATIENT
Start: 2021-04-21 | End: 2021-04-23 | Stop reason: HOSPADM

## 2021-04-21 RX ORDER — METOPROLOL SUCCINATE 25 MG/1
25 TABLET, EXTENDED RELEASE ORAL DAILY
Status: DISCONTINUED | OUTPATIENT
Start: 2021-04-21 | End: 2021-04-23 | Stop reason: HOSPADM

## 2021-04-21 RX ORDER — OXYCODONE AND ACETAMINOPHEN 10; 325 MG/1; MG/1
1 TABLET ORAL ONCE
Status: COMPLETED | OUTPATIENT
Start: 2021-04-21 | End: 2021-04-21

## 2021-04-21 RX ORDER — SERTRALINE HYDROCHLORIDE 100 MG/1
100 TABLET, FILM COATED ORAL DAILY
Status: DISCONTINUED | OUTPATIENT
Start: 2021-04-21 | End: 2021-04-23 | Stop reason: HOSPADM

## 2021-04-21 RX ORDER — SODIUM CHLORIDE 9 MG/ML
25 INJECTION, SOLUTION INTRAVENOUS PRN
Status: DISCONTINUED | OUTPATIENT
Start: 2021-04-21 | End: 2021-04-21

## 2021-04-21 RX ORDER — SODIUM CHLORIDE 0.9 % (FLUSH) 0.9 %
5-40 SYRINGE (ML) INJECTION EVERY 12 HOURS SCHEDULED
Status: DISCONTINUED | OUTPATIENT
Start: 2021-04-21 | End: 2021-04-21

## 2021-04-21 RX ORDER — SODIUM CHLORIDE 0.9 % (FLUSH) 0.9 %
5-40 SYRINGE (ML) INJECTION EVERY 12 HOURS SCHEDULED
Status: DISCONTINUED | OUTPATIENT
Start: 2021-04-21 | End: 2021-04-23 | Stop reason: HOSPADM

## 2021-04-21 RX ORDER — PROMETHAZINE HYDROCHLORIDE 25 MG/1
12.5 TABLET ORAL EVERY 6 HOURS PRN
Status: DISCONTINUED | OUTPATIENT
Start: 2021-04-21 | End: 2021-04-23 | Stop reason: HOSPADM

## 2021-04-21 RX ORDER — ONDANSETRON 2 MG/ML
4 INJECTION INTRAMUSCULAR; INTRAVENOUS EVERY 6 HOURS PRN
Status: DISCONTINUED | OUTPATIENT
Start: 2021-04-21 | End: 2021-04-23 | Stop reason: HOSPADM

## 2021-04-21 RX ORDER — ATORVASTATIN CALCIUM 40 MG/1
80 TABLET, FILM COATED ORAL NIGHTLY
Status: DISCONTINUED | OUTPATIENT
Start: 2021-04-21 | End: 2021-04-23 | Stop reason: HOSPADM

## 2021-04-21 RX ORDER — CLOPIDOGREL BISULFATE 75 MG/1
75 TABLET ORAL DAILY
Status: DISCONTINUED | OUTPATIENT
Start: 2021-04-21 | End: 2021-04-23 | Stop reason: HOSPADM

## 2021-04-21 RX ORDER — ASPIRIN 81 MG/1
81 TABLET ORAL DAILY
Status: DISCONTINUED | OUTPATIENT
Start: 2021-04-21 | End: 2021-04-23 | Stop reason: HOSPADM

## 2021-04-21 RX ORDER — SODIUM CHLORIDE 0.9 % (FLUSH) 0.9 %
5-40 SYRINGE (ML) INJECTION PRN
Status: DISCONTINUED | OUTPATIENT
Start: 2021-04-21 | End: 2021-04-21

## 2021-04-21 RX ORDER — TRAZODONE HYDROCHLORIDE 100 MG/1
100 TABLET ORAL NIGHTLY
Status: DISCONTINUED | OUTPATIENT
Start: 2021-04-21 | End: 2021-04-23 | Stop reason: HOSPADM

## 2021-04-21 RX ORDER — SODIUM CHLORIDE 9 MG/ML
25 INJECTION, SOLUTION INTRAVENOUS PRN
Status: DISCONTINUED | OUTPATIENT
Start: 2021-04-21 | End: 2021-04-23 | Stop reason: HOSPADM

## 2021-04-21 RX ORDER — SODIUM CHLORIDE 0.9 % (FLUSH) 0.9 %
5-40 SYRINGE (ML) INJECTION PRN
Status: DISCONTINUED | OUTPATIENT
Start: 2021-04-21 | End: 2021-04-23 | Stop reason: HOSPADM

## 2021-04-21 RX ORDER — POLYETHYLENE GLYCOL 3350 17 G/17G
17 POWDER, FOR SOLUTION ORAL DAILY PRN
Status: DISCONTINUED | OUTPATIENT
Start: 2021-04-21 | End: 2021-04-23 | Stop reason: HOSPADM

## 2021-04-21 RX ORDER — ACETAMINOPHEN 325 MG/1
650 TABLET ORAL EVERY 6 HOURS PRN
Status: DISCONTINUED | OUTPATIENT
Start: 2021-04-21 | End: 2021-04-23 | Stop reason: HOSPADM

## 2021-04-21 RX ADMIN — VANCOMYCIN HYDROCHLORIDE 1750 MG: 10 INJECTION, POWDER, LYOPHILIZED, FOR SOLUTION INTRAVENOUS at 13:24

## 2021-04-21 RX ADMIN — PIPERACILLIN SODIUM AND TAZOBACTAM SODIUM 4500 MG: 4; .5 INJECTION, POWDER, LYOPHILIZED, FOR SOLUTION INTRAVENOUS at 12:35

## 2021-04-21 RX ADMIN — FUROSEMIDE 40 MG: 40 TABLET ORAL at 16:12

## 2021-04-21 RX ADMIN — ASPIRIN 81 MG: 81 TABLET, FILM COATED ORAL at 16:12

## 2021-04-21 RX ADMIN — IOPAMIDOL 85 ML: 755 INJECTION, SOLUTION INTRAVENOUS at 10:26

## 2021-04-21 RX ADMIN — Medication 10 ML: at 22:09

## 2021-04-21 RX ADMIN — PIPERACILLIN SODIUM AND TAZOBACTAM SODIUM 3375 MG: 3; .375 INJECTION, POWDER, LYOPHILIZED, FOR SOLUTION INTRAVENOUS at 21:51

## 2021-04-21 RX ADMIN — SERTRALINE 100 MG: 100 TABLET, FILM COATED ORAL at 16:12

## 2021-04-21 RX ADMIN — APIXABAN 10 MG: 5 TABLET, FILM COATED ORAL at 13:27

## 2021-04-21 RX ADMIN — APIXABAN 10 MG: 5 TABLET, FILM COATED ORAL at 21:51

## 2021-04-21 RX ADMIN — TRAZODONE HYDROCHLORIDE 100 MG: 100 TABLET ORAL at 21:51

## 2021-04-21 RX ADMIN — ATORVASTATIN CALCIUM 80 MG: 40 TABLET, FILM COATED ORAL at 21:51

## 2021-04-21 RX ADMIN — CLOPIDOGREL BISULFATE 75 MG: 75 TABLET ORAL at 16:12

## 2021-04-21 RX ADMIN — OXYCODONE HYDROCHLORIDE AND ACETAMINOPHEN 1 TABLET: 10; 325 TABLET ORAL at 13:52

## 2021-04-21 ASSESSMENT — PAIN SCALES - GENERAL: PAINLEVEL_OUTOF10: 8

## 2021-04-21 ASSESSMENT — PAIN DESCRIPTION - PAIN TYPE: TYPE: ACUTE PAIN

## 2021-04-21 NOTE — ED PROVIDER NOTES
Years of education: Not on file    Highest education level: Not on file   Occupational History    Not on file   Social Needs    Financial resource strain: Not on file    Food insecurity     Worry: Not on file     Inability: Not on file    Transportation needs     Medical: Not on file     Non-medical: Not on file   Tobacco Use    Smoking status: Former Smoker     Packs/day: 2.00     Years: 35.00     Pack years: 70.00     Types: Cigarettes     Quit date: 3/1/2020     Years since quittin.1    Smokeless tobacco: Never Used   Substance and Sexual Activity    Alcohol use: No    Drug use: Not Currently     Comment: hx of drug use    Sexual activity: Not on file   Lifestyle    Physical activity     Days per week: Not on file     Minutes per session: Not on file    Stress: Not on file   Relationships    Social connections     Talks on phone: Not on file     Gets together: Not on file     Attends Muslim service: Not on file     Active member of club or organization: Not on file     Attends meetings of clubs or organizations: Not on file     Relationship status: Not on file    Intimate partner violence     Fear of current or ex partner: Not on file     Emotionally abused: Not on file     Physically abused: Not on file     Forced sexual activity: Not on file   Other Topics Concern    Not on file   Social History Narrative    Not on file     Current Facility-Administered Medications   Medication Dose Route Frequency Provider Last Rate Last Admin    enoxaparin (LOVENOX) injection 40 mg  40 mg Subcutaneous Nightly Chon Walker MD   40 mg at 21    aspirin EC tablet 81 mg  81 mg Oral Daily Burnadette Sas, APRN - CNP   81 mg at 21 0936    atorvastatin (LIPITOR) tablet 80 mg  80 mg Oral Nightly Burnadette Sas, APRN - CNP   80 mg at 21    clopidogrel (PLAVIX) tablet 75 mg  75 mg Oral Daily Burnadette Sas, APRN - CNP   75 mg at 21 0936    furosemide (LASIX) tablet 40 mg  40 mg Oral Daily Joedelia Harshad, APRN - CNP   40 mg at 04/22/21 3151    metoprolol succinate (TOPROL XL) extended release tablet 25 mg  25 mg Oral Daily Joedelia Harshad, APRN - CNP   Stopped at 04/22/21 5004    sertraline (ZOLOFT) tablet 100 mg  100 mg Oral Daily Joedelia Harshad, APRN - CNP   100 mg at 04/22/21 6056    traZODone (DESYREL) tablet 100 mg  100 mg Oral Nightly Joedelia Harshad, APRN - CNP   100 mg at 04/22/21 2159    sodium chloride flush 0.9 % injection 5-40 mL  5-40 mL Intravenous 2 times per day Diania Harshad, APRN - CNP   10 mL at 04/22/21 2200    sodium chloride flush 0.9 % injection 5-40 mL  5-40 mL Intravenous PRN Diania Harshad, APRN - CNP        0.9 % sodium chloride infusion  25 mL Intravenous PRN Gorge Patricia, APRN - CNP        promethazine (PHENERGAN) tablet 12.5 mg  12.5 mg Oral Q6H PRN Gorge Patricia, APRN - CNP        Or    ondansetron (ZOFRAN) injection 4 mg  4 mg Intravenous Q6H PRN Gorge Patricia, APRN - CNP        polyethylene glycol (GLYCOLAX) packet 17 g  17 g Oral Daily PRN Goreg Patricia, APRN - CNP        acetaminophen (TYLENOL) tablet 650 mg  650 mg Oral Q6H PRN Diania Harshad, APRN - CNP        Or    acetaminophen (TYLENOL) suppository 650 mg  650 mg Rectal Q6H PRN Gorge Patricia, APRN - CNP        piperacillin-tazobactam (ZOSYN) 3,375 mg in sodium chloride 0.9 % 100 mL IVPB extended infusion (mini-bag)  3,375 mg Intravenous Q8H Diania Harshad, APRN - CNP 25 mL/hr at 04/23/21 0525 3,375 mg at 04/23/21 0525    acetaminophen (TYLENOL) tablet 650 mg  650 mg Oral Once Avelino West MD        vancomycin 1500 mg in dextrose 5% 300 mL IVPB  1,500 mg Intravenous Q12H Gorge Patricia, APRN - CNP   Stopped at 04/23/21 0520    ipratropium (ATROVENT) 0.02 % nebulizer solution 0.5 mg  0.5 mg Nebulization Q4H PRN Bhavani Washington MD        albuterol (PROVENTIL) nebulizer solution 2.5 mg  2.5 mg Nebulization Q4H PRN Adama Clemons, MD        HYDROcodone-acetaminophen (NORCO) 5-325 MG per tablet 1 tablet  1 tablet Oral Q6H PRN Brandon Christensen MD   1 tablet at 04/23/21 0314     Allergies   Allergen Reactions    Dilaudid [Hydromorphone Hcl] Nausea And Vomiting    Codeine Itching       REVIEW OF SYSTEMS  10 systems reviewed, pertinent positives per HPI otherwise noted to be negative. PHYSICAL EXAM  /72   Pulse 84   Temp 97.8 °F (36.6 °C) (Oral)   Resp 16   Ht 5' 4\" (1.626 m)   Wt 292 lb (132.5 kg)   SpO2 94%   BMI 50.12 kg/m²   GENERAL APPEARANCE: Awake and alert. Cooperative. No acute distress. HEAD: Normocephalic. Atraumatic. EYES: PERRL. EOM's grossly intact. Test of Skew:  ENT: Mucous membranes are moist.   NECK: Supple, trachea midline. HEART: RRR. Normal S1S2, no rubs, gallops, or murmurs noted; left-sided dependent edema from the axilla to the left chest in the midaxillary line and extending to the mid clavicular line  LUNGS: Respirations unlabored. CTAB. Good air exchange. No wheezes, rales, or rhonchi. Speaking comfortably in full sentences. ABDOMEN: Morbid obesity Soft. Non-distended. Non-tender. No guarding or rebound. Normal bowel sounds. EXTREMITIES: No peripheral edema. MAEE. No acute deformities. SKIN: Warm and dry. No acute rashes. NEUROLOGICAL: Alert and oriented X 3. CN II-XII intact. No gross facial drooping. Strength 5/5, sensation intact. Normal coordination. No pronator drift. No truncal instability; Gait normal.   PSYCHIATRIC: Normal mood and affect. LABS  I have reviewed all labs for this visit. Results for orders placed or performed during the hospital encounter of 04/21/21   Culture, Blood 1    Specimen: Blood   Result Value Ref Range    Blood Culture, Routine       No Growth to date. Any change in status will be called. Culture, Blood 2    Specimen: Blood   Result Value Ref Range    Culture, Blood 2       No Growth to date. Any change in status will be called.    COVID-19, Rapid mmol/L    CO2 26 21 - 32 mmol/L    Anion Gap 7 3 - 16    Glucose 102 (H) 70 - 99 mg/dL    BUN 8 7 - 20 mg/dL    CREATININE 0.7 (L) 0.9 - 1.3 mg/dL    GFR Non-African American >60 >60    GFR African American >60 >60    Calcium 8.4 8.3 - 10.6 mg/dL   CBC   Result Value Ref Range    WBC 10.0 4.0 - 11.0 K/uL    RBC 3.84 (L) 4.20 - 5.90 M/uL    Hemoglobin 11.4 (L) 13.5 - 17.5 g/dL    Hematocrit 34.6 (L) 40.5 - 52.5 %    MCV 90.1 80.0 - 100.0 fL    MCH 29.7 26.0 - 34.0 pg    MCHC 33.0 31.0 - 36.0 g/dL    RDW 18.4 (H) 12.4 - 15.4 %    Platelets 236 830 - 429 K/uL    MPV 8.4 5.0 - 10.5 fL   Magnesium   Result Value Ref Range    Magnesium 1.80 1.80 - 2.40 mg/dL   EKG 12 Lead   Result Value Ref Range    Ventricular Rate 98 BPM    Atrial Rate 98 BPM    P-R Interval 168 ms    QRS Duration 74 ms    Q-T Interval 350 ms    QTc Calculation (Bazett) 446 ms    P Axis -15 degrees    R Axis -51 degrees    T Axis 46 degrees    Diagnosis       Normal sinus rhythmLeft axis deviationLow voltage QRSInferior infarct , age undeterminedPoor R wave progressionNonspecific ST abnormalityAbnormal ECGWhen compared with ECG of 21-APR-2021 07:35, (unconfirmed)HR increasedConfirmed by DODIE Bauer MD ([id number]) on 4/21/2021 12:24:03 PM   EKG 12 Lead   Result Value Ref Range    Ventricular Rate 102 BPM    Atrial Rate 102 BPM    P-R Interval 180 ms    QRS Duration 74 ms    Q-T Interval 336 ms    QTc Calculation (Bazett) 437 ms    P Axis 50 degrees    R Axis -66 degrees    T Axis 54 degrees    Diagnosis       Sinus tachycardiaLeft axis deviationLow voltageInferior infarct (cited on or before 19-APR-2021)Nonspecific ST abnormalityWhen compared with ECG of 21-APR-2021 08:17,No significant change was foundConfirmed by DODIE Bauer MD ([id number]) on 4/22/2021 8:56:32 AM       EKG  The Ekg interpreted by myself  normal sinus rhythm with a rate of 98  Axis is   Left axis deviation  QTc is  446  Intervals and Durations are unremarkable.       No specific ST-T wave changes appreciated. Inferior infarct age-indeterminate; low voltage EKG  No evidence of acute ischemia. No significant change from prior EKG dated April 20, 2021    Cardiac Monitoring: Sinus rhythm without ectopy        RADIOLOGY  X-RAYS:  I have reviewed radiologic plain film image(s). ALL OTHER NON-PLAIN FILM IMAGES SUCH AS CT, ULTRASOUND AND MRI HAVE BEEN READ BY THE RADIOLOGIST. VL Extremity Venous Left   Final Result      CTA PULMONARY W CONTRAST   Final Result   1. Moderate subcutaneous inflammatory stranding throughout the left upper   chest wall axilla either due to cellulitis or edema; correlate with physical   exam.         XR CHEST PORTABLE   Final Result   No acute cardiopulmonary disease. VASCULAR REPORT    (Results Pending)              Rechecks: Physical assessment performed. Pain controlled in the ED    ED COURSE/MDM  Patient seen and evaluated. Old records reviewed. Labs and imaging reviewed and results discussed with patient. Patient was given pain control and eliquis in the ED with good symptomatic relief. Patient was reassessed as noted above. I spoke with cardiology who recommended that he may be treated for the DVT here at [place]. Found to have an upper extremity acute DVT. Plan of care discussed with patient and family. Patient and family in agreement with plan. Patient was given scripts for the following medications. I counseled patient how to take these medications. Current Discharge Medication List          CLINICAL IMPRESSION  1. Acute deep vein thrombosis (DVT) of left upper extremity, unspecified vein (HCC)        Blood pressure 118/72, pulse 84, temperature 97.8 °F (36.6 °C), temperature source Oral, resp. rate 16, height 5' 4\" (1.626 m), weight 292 lb (132.5 kg), SpO2 94 %. DISPOSITION  Baylee Smith was admitted in guarded condition.        Leslie Evnas MD  04/23/21 0800       Leslie Evans MD  04/23/21 4450

## 2021-04-21 NOTE — ED NOTES
Call placed to Cardiology @ 1200 Dr Zahida Hayden is on call at this time.       Maryann Osman  04/21/21 1202    Dr Christian Lebron returned the call to Dr. Farhat Dang @ [address] [facility]  04/21/21 1205

## 2021-04-21 NOTE — PROGRESS NOTES
Pharmacy Note  Vancomycin Consult    Mariama Guzman is a 64 y.o. male started on Vancomycin for Skin and soft tissue infection; consult received from [facility] to manage therapy. Also receiving the following antibiotics: Zosyn 3.375g q8h.     Patient Active Problem List   Diagnosis    Crush injury of right foot    Shortness of breath    Chest pain    Erectile dysfunction    Hyperglycemia    Anxiety    Depression    Tobacco abuse    History of alcohol abuse    Fatigue    OANH (obstructive sleep apnea)    Hypersomnia    Chronic obstructive pulmonary disease (HCC)    Lumbar radiculopathy    HNP (herniated nucleus pulposus), lumbar    Spondylosis without myelopathy or radiculopathy, lumbar region    DDD (degenerative disc disease), lumbar    Lumbar spine pain    Acute respiratory distress    Class 3 severe obesity with body mass index (BMI) of 45.0 to 49.9 in adult [facility])    Pneumonia, primary atypical    Acute respiratory failure with hypoxia (HCC)    Moderate protein-calorie malnutrition (HCC)    Acute respiratory failure (HCC)    Acute on chronic respiratory failure with hypoxemia (HCC)    Acute diastolic congestive heart failure (HCC)    Hyperlipidemia    Insomnia    Morbid obesity with BMI of 45.0-49.9, adult ([address]    Abnormal cardiovascular stress test    Coronary artery disease involving native heart with angina pectoris (HCC)    S/P three vessel coronary artery bypass    Acute kidney injury ([address]    Acute diverticulitis    Chronic combined systolic and diastolic congestive heart failure (HCC)    Second degree AV block, Mobitz type I    Asystole ([address]    Ischemic cardiomyopathy    Diverticulitis of colon    Coronary artery disease involving native coronary artery of native heart with unstable angina pectoris ([address]    CHB (complete heart block) ([address]    Sinus bradycardia    Cellulitis     Allergies:  Dilaudid [hydromorphone hcl] and Codeine       Recent Labs     04/20/21  0600 04/21/21  0855   BUN 9 13 CREATININE 0.8* 0.8*   WBC 12.3* 11.4*       Intake/Output Summary (Last 24 hours) at 4/21/2021 1705  Last data filed at 4/21/2021 1320  Gross per 24 hour   Intake 50 ml   Output --   Net 50 ml     Culture Date      Source                       Results      Ht Readings from Last 1 Encounters:   04/21/21 5' 4\" (1.626 m)        Wt Readings from Last 1 Encounters:   04/21/21 285 lb (129.3 kg)       Body mass index is 48.92 kg/m². Estimated CrCl based on IBW = 84.3 ml/min    Goal Trough Level: 15-20 mcg/mL    Assessment/Plan:  Received Vancomycin one time loading dose of 1750 mg x1 in the ED   Will initiate Vancomycin 1500 mg IV every 12 hours. Timing of trough level will be determined based on culture results, renal function, and clinical response. Trough ordered for 4/23 @ 1300 prior to 4th dose    Thank you for the consult. Will continue to follow.

## 2021-04-21 NOTE — ED TRIAGE NOTES
Chief Complaint   Patient presents with    Chest Pain     2 stents placed on Monday, states today having sob and chest soreness, swelling to left side of chest and under left arm

## 2021-04-21 NOTE — FLOWSHEET NOTE
04/21/21 1515   Vital Signs   Temp 97.1 °F (36.2 °C)   Temp Source Oral   Pulse 80   Heart Rate Source Monitor   Resp 18   /69   BP Location Left lower arm   Patient Position Up in chair   Level of Consciousness Alert (0)   MEWS Score 1   Patient Currently in Pain Denies   Oxygen Therapy   SpO2 97 %   O2 Device None (Room air)   O2 Flow Rate (L/min) 0 L/min   Ad[facility] to [facility] at this time. Pt o/a x4. Pt assessment complete; see flow sheets. Pt oriented to room and call light system. Low fall risk. Patient is able to demonstrate the ability to move from a reclining position to an upright position within the recliner. Pt refuses a 4 eyes assessment at this time. Only skin issues noted on patient= cellulitis to L upper chest wall and L arm. No other issues noted on visible skin by this RN.     Dylna Keith RN

## 2021-04-21 NOTE — PROGRESS NOTES
RESPIRATORY THERAPY ASSESSMENT    Name:  Baylee Smith  Medical Record Number:  [id number]  Age: 64 y.o. Gender: male  : 1960  Today's Date:  2021  Room:  [address] [place] [address]    Assessment     Is the patient being admitted for a COPD or Asthma exacerbation? No   (If yes the patient will be seen every 4 hours for the first 24 hours and then reassessed)    Patient Admission Diagnosis      Allergies  Allergies   Allergen Reactions    Dilaudid [Hydromorphone Hcl] Nausea And Vomiting    Codeine Itching       Minimum Predicted Vital Capacity:               Actual Vital Capacity:                     Pulmonary History:COPD, Pulmonary Fibrosis and OANH  Home Oxygen Therapy:  room air  Home Respiratory Therapy:Albuterol and Ipratropium Bromide PRN  Current Respiratory Therapy:  Atrovent TID and Albuterol PRN          Respiratory Severity Index(RSI)   Patients with orders for inhalation medications, oxygen, or any therapeutic treatment modality will be placed on Respiratory Protocol. They will be assessed with the first treatment and at least every 72 hours thereafter. The following severity scale will be used to determine frequency of treatment intervention.     Smoking History: Pulmonary Disease or Smoking History, Greater than 15 pack year = 2    Social History  Social History     Tobacco Use    Smoking status: Former Smoker     Packs/day: 2.00     Years: 35.00     Pack years: 70.00     Types: Cigarettes     Quit date: 3/1/2020     Years since quittin.1    Smokeless tobacco: Never Used   Substance Use Topics    Alcohol use: No    Drug use: Not Currently     Comment: hx of drug use       Recent Surgical History: None = 0  Past Surgical History  Past Surgical History:   Procedure Laterality Date    CHOLECYSTECTOMY      CORONARY ARTERY BYPASS GRAFT N/A 2020    CORONARY ARTERY BYPASS GRAFTING X3, INTERNAL MAMMARY ARTERY, SAPHENOUS VEIN GRAFT, ON PUMP, STERNAL PLATING, 5 LEVEL BILATERAL INTERCOSTAL NERVE BLOCK, PLATELET GEL APPLICATION performed by Rebecca Reina MD at [facility]  03/16/2011    cystoscopy left ureteroscopy, left retrograde, double J stent placement    FINGER AMPUTATION Right 2/2/2021    RIGHT MIDDLE FINGER IRRIGATION AND DEBRIDEMENT WITH REVISION AMPUTATION AND BONE SHORTENING, POSSIBLE NAIL ABLATION performed by José Manuel Champagne MD at [facility] Right 12/24/2019    RIGHT LUMBAR FIVE SACRAL ONE TRANSFORAMINAL EPIDURAL STEROID INJECTION SITE CONFIRMED BY FLUOROSCOPY performed by Jb Weiss MD at [facility] Right 1/7/2020    RIGHT LUMBAR FOUR AND LUMBAR FIVE TRANSFORAMINAL EPIDURAL STEROID INJECTION SITE CONFIRMED BY FLUOROSCOPY performed by Jb Weiss MD at [place] [address]       Level of Consciousness: Alert, Oriented, and Cooperative = 0    Level of Activity: Walking unassisted = 0    Respiratory Pattern: Regular Pattern; RR 8-20 = 0    Breath Sounds: Clear = 0    Sputum   ,  ,    Cough: Strong, spontaneous, non-productive = 0    Vital Signs   /69   Pulse 80   Temp 97.1 °F (36.2 °C) (Oral)   Resp 18   Ht 5' 4\" (1.626 m)   Wt 285 lb (129.3 kg)   SpO2 97%   BMI 48.92 kg/m²   SPO2 (COPD values may differ): Greater than or equal to 92% on room air = 0    Peak Flow (asthma only): not applicable = 0    RSI: 0-4 = See once and convert to home regimen or discontinue        Plan       Goals: medication delivery, mobilize retained secretions, volume expansion and improve oxygenation    Patient/caregiver was educated on the proper method of use for Respiratory Care Devices:  Yes      Level of patient/caregiver understanding able to:   ? Verbalize understanding   ? Demonstrate understanding       ? Teach back        ? Needs reinforcement       ? No available caregiver               ? Other:     Response to education:  Excellent     Is patient being placed on Home Treatment Regimen?   Yes     Does the patient have everything they need prior to discharge? Yes     Comments: Patient interviewed and assessed. Plan of Care: Albuterol Q4PRN and Atrovent Q4 PRN    Electronically signed by Delfino Cano RCP on 4/21/2021 at 5:09 PM    Respiratory Protocol Guidelines     1. Assessment and treatment by Respiratory Therapy will be initiated for medication and therapeutic interventions upon initiation of aerosolized medication. 2. Physician will be contacted for respiratory rate (RR) greater than 35 breaths per minute. Therapy will be held for heart rate (HR) greater than 140 beats per minute, pending direction from physician. 3. Bronchodilators will be administered via Metered Dose Inhaler (MDI) with spacer when the following criteria are met:  a. Alert and cooperative     b. HR < 140 bpm  c. RR < 30 bpm                d. Can demonstrate a 2-3 second inspiratory hold  4. Bronchodilators will be administered via Hand Held Nebulizer HAYLEE [facility]) to patients when ANY of the following criteria are met  a. Incognizant or uncooperative          b. Patients treated with HHN at Home        c. Unable to demonstrate proper use of MDI with spacer     d. RR > 30 bpm   5. Bronchodilators will be delivered via Metered Dose Inhaler (MDI), HHN, Aerogen to intubated patients on mechanical ventilation. 6. Inhalation medication orders will be delivered and/or substituted as outlined below. Aerosolized Medications Ordering and Administration Guidelines:    1. All Medications will be ordered by a physician, and their frequency and/or modality will be adjusted as defined by the patients Respiratory Severity Index (RSI) score. 2. If the patient does not have documented COPD, consider discontinuing anticholinergics when RSI is less than 9.  3. If the bronchospasm worsens (increased RSI), then the bronchodilator frequency can be increased to a maximum of every 4 hours.   If greater than every 4 hours is required, the physician will be contacted. 4. If the bronchospasm improves, the frequency of the bronchodilator can be decreased, based on the patient's RSI, but not less than home treatment regimen frequency. 5. Bronchodilator(s) will be discontinued if patient has a RSI less than 9 and has received no scheduled or as needed treatment for 72  Hrs. Patients Ordered on a Mucolytic Agent:    1. Must always be administered with a bronchodilator. 2. Discontinue if patient experiences worsened bronchospasm, or secretions have lessened to the point that the patient is able to clear them with a cough. Anti-inflammatory and Combination Medications:    1. If the patient lacks prior history of lung disease, is not using inhaled anti-inflammatory medication at home, and lacks wheezing by examination or by history for at least 24 hours, contact physician for possible discontinuation.

## 2021-04-21 NOTE — TELEPHONE ENCOUNTER
[facility] Transitions Initial Follow Up Call    Outreach made within 2 business days of discharge: No    Patient: Taylor Chandler Patient : 1960   MRN: [id number]  Reason for Admission: There are no discharge diagnoses documented for the most recent discharge.   Discharge Date: 21       Spoke with: patient readmitted to ED    Discharge department/facility: [facility]    Scheduled appointment with PCP within 7-14 days    Follow Up  Future Appointments   Date Time Provider Riri Jara   2021  9:30 AM Mai Buck MD [facility]   2021  8:30 AM JAROD Gentile MD [address]

## 2021-04-21 NOTE — PROGRESS NOTES
Bedside report and transfer of care given to [facility]. Pt currently resting in chair with the call light within reach. Pt denies any other care needs at this time. Pt stable at this time.     Michael Pelaez RN

## 2021-04-21 NOTE — H&P
Hospital Medicine History & Physical      PCP: ANICETO Mosquera - CNP    Date of Admission: 4/21/2021    Date of Service: Pt seen/examined on 4/21/2021    Chief Complaint:    Chief Complaint   Patient presents with    Chest Pain     2 stents placed on Monday, states today having sob and chest soreness, swelling to left side of chest and under left arm     History Of Present Illness: The patient is a 64 y.o. male with CAD status post PCI with ISREAL x2 on Monday, recent diverticulitis who presented to [facility] ED with complaint of chest pain, redness and swelling to the left chest wall and armpit. Patient states that he had a recent prolonged admission for his chest pain resulting in the new stent placements and had a difficult time with IV access during his admission. They placed a PICC line which was removed yesterday prior to his discharge. He states he got home and was doing okay but by that evening he developed pain to the left side of his chest wall and noticed that it was red and swollen. He states it was so swollen he felt like he can even put his arm down. He denies any fevers or chills and reports compliance with his medications. He states he did not notice any of the redness while he was in the hospital and this was all new since going home. He does not have prior history of blood clots.     Past Medical History:        Diagnosis Date    Acute diverticulitis 04/10/2021    Acute kidney injury ([address] 04/08/2021    Acute on chronic respiratory failure with hypoxemia (HCC)     Acute respiratory failure ([address] 08/19/2020    Acute respiratory failure with hypoxia ([address]     Anxiety 01/06/2020    Aortic valve calcification 09/2020    seen on lung CT    CAD in native artery 04/14/2021    Chronic obstructive pulmonary disease ([address] 09/03/2019    PFT done 9/03/19    Chronic systolic congestive heart failure ([address] 04/10/2021    Class 3 severe obesity with body mass index (BMI) of 45.0 to 49.9 in adult [facility])     Coronary artery calcification 09/2020    seen on lung ct    Coronary artery disease involving native heart with angina pectoris ([facility] 09/22/2020    Crush injury of right foot 07/23/2015    DDD (degenerative disc disease), lumbar 01/06/2020    Depression 01/06/2020    Diverticulitis of colon 04/10/2021    Erectile dysfunction 01/06/2020    Fatigue 01/06/2020    History of alcohol abuse 01/06/2020    History of drug use     HNP (herniated nucleus pulposus), lumbar 01/06/2020    Hyperglycemia 01/06/2020    Hyperlipidemia     Hypersomnia 01/06/2020    Insomnia     Ischemic cardiomyopathy     Kidney stone     Lumbar radiculopathy 01/06/2020    Lumbar spine pain 01/06/2020    LVH (left ventricular hypertrophy)     seen on echo 8/2020, moderate    Mobitz type I Wenckebach atrioventricular block 04/10/2021    Moderate protein-calorie malnutrition ([facility] 03/17/2020    Observed sleep apnea 01/06/2020    OANH (obstructive sleep apnea) 01/06/2020    Pneumonia, primary atypical     Reactive depression 01/06/2020    S/P three vessel coronary artery bypass 10/26/2020    Spondylosis without myelopathy or radiculopathy, lumbar region 01/06/2020    Tobacco abuse 01/06/2020    Tobacco use 01/06/2020       Past Surgical History:        Procedure Laterality Date    CHOLECYSTECTOMY      CORONARY ARTERY BYPASS GRAFT N/A 9/25/2020    CORONARY ARTERY BYPASS GRAFTING X3, INTERNAL MAMMARY ARTERY, SAPHENOUS VEIN GRAFT, ON PUMP, STERNAL PLATING, 5 LEVEL BILATERAL INTERCOSTAL NERVE BLOCK, PLATELET GEL APPLICATION performed by Renuka Damon MD at [address] [facility]  03/16/2011    cystoscopy left ureteroscopy, left retrograde, double J stent placement    FINGER AMPUTATION Right 2/2/2021    RIGHT MIDDLE FINGER IRRIGATION AND DEBRIDEMENT WITH REVISION AMPUTATION AND BONE SHORTENING, POSSIBLE NAIL ABLATION performed by Ysa Pham MD at [address] [facility] [facility] [address] [place] 12/24/2019    RIGHT LUMBAR FIVE SACRAL ONE TRANSFORAMINAL EPIDURAL STEROID INJECTION SITE CONFIRMED BY FLUOROSCOPY performed by Gunjan Barnhart MD at [address] Right 1/7/2020    RIGHT LUMBAR FOUR AND LUMBAR FIVE TRANSFORAMINAL EPIDURAL STEROID INJECTION SITE CONFIRMED BY FLUOROSCOPY performed by Gunjan Barnhart MD at [place] [address]       Medications Prior to Admission:    Prior to Admission medications    Medication Sig Start Date End Date Taking?  Authorizing Provider   furosemide (LASIX) 80 MG tablet Take 0.5 tablets by mouth daily 4/20/21 4/20/22 Yes Verneda PallANICETO CNP   metoprolol succinate (TOPROL XL) 25 MG extended release tablet Take 1 tablet by mouth daily 4/20/21  Yes Verneda PallANICETO CNP   ipratropium (ATROVENT) 0.02 % nebulizer solution Take 2.5 mLs by nebulization 3 times daily 3/2/21  Yes ANICETO White CNP   traZODone (DESYREL) 100 MG tablet Take 1 tablet by mouth nightly  Patient taking differently: Take 100 mg by mouth 2 times daily Am pm 3/1/21  Yes ANICETO White CNP   sertraline (ZOLOFT) 100 MG tablet Take 1 tablet by mouth daily 3/1/21  Yes ANICETO White CNP   clopidogrel (PLAVIX) 75 MG tablet Take 1 tablet by mouth daily 1/13/21  Yes Paulino Mitchell MD   atorvastatin (LIPITOR) 80 MG tablet Take 1 tablet by mouth nightly 10/28/20  Yes Paulino Mitchell MD   aspirin 81 MG EC tablet Take 1 tablet by mouth daily 9/30/20  Yes ANICETO Aleman CNP   albuterol (PROVENTIL) (2.5 MG/3ML) 0.083% nebulizer solution Take 3 mLs by nebulization every 6 hours as needed for Wheezing or Shortness of Breath 9/25/19  Yes ANICETO White CNP   albuterol sulfate HFA (PROVENTIL HFA) 108 (90 Base) MCG/ACT inhaler Inhale 2 puffs into the lungs every 6 hours as needed for Wheezing 5/17/19  Yes Daryl Yu MD   Handicap [place] [id number] [facility] by Does not apply route Duration - 5 years 3/1/21   ANICETO White CNP left heart cath into the right wrist  M/S: Tenderness to palpation to left proximal upper extremity as well as left anterior chest wall, no crepitus  Neuro: Awake. Grossly nonfocal    Psych: Oriented x 3. No anxiety or agitation. CBC:   Recent Labs     04/19/21  0553 04/20/21  0600 04/21/21  0855   WBC 10.0 12.3* 11.4*   HGB 12.0* 11.6* 12.6*   HCT 36.2* 35.4* 38.3*   MCV 88.4 88.4 89.6    168 175     BMP:   Recent Labs     04/19/21  0553 04/20/21  0600 04/21/21  0855    139 138   K 4.2 3.7  3.7 3.6    104 103   CO2 27 29 28   BUN 11 9 13   CREATININE 0.8* 0.8* 0.8*     LIVER PROFILE:   Recent Labs     04/21/21  0855   AST 19   ALT 18   BILITOT <0.2   ALKPHOS 70     PT/INR: No results for input(s): PROTIME, INR in the last 72 hours. APTT:   Recent Labs     04/19/21  0553   APTT 55.4*     CARDIAC ENZYMES  Recent Labs     04/21/21  0855   TROPONINI <0.01     CULTURES  COVID: Not detected    EKG:  I have reviewed the EKG with the following interpretation:   Normal sinus rhythm  Left axis deviation  Low voltage QRS  Inferior infarct , age undetermined  Poor R wave progression  Nonspecific ST abnormality  Abnormal ECG  When compared with ECG    RADIOLOGY  VL Extremity Venous Left         CTA PULMONARY W CONTRAST   Final Result   1. Moderate subcutaneous inflammatory stranding throughout the left upper   chest wall axilla either due to cellulitis or edema; correlate with physical   exam.         XR CHEST PORTABLE   Final Result   No acute cardiopulmonary disease. VASCULAR REPORT    (Results Pending)     Tech Comments   Right   There is no evidence of deep venous thrombosis involving the right subclavian   vein. Left   Technically difficult exam due to body habitus and edema. There is evidence of acute totally occluding superficial venous thrombosis   involving the left basilic vein (upper arm).    There is no other evidence of deep or superficial venous thrombosis involving   the left upper extremity. There are no previous exams for comparison. Pertinent previous results reviewed   [facility] 4/19/21  CONCLUSIONS:   Successful rotational atherectomy and PCI of D1 with single   drug-eluting stent   Successful rotational atherectomy and PCI of left main/LAD with   single drug-eluting stent   Medical therapy for LCx , this lesion does not appear to be   amenable for PCI     ASSESSMENT/PLAN:  Cellulitis left chest wall  -Status post PICC line in the left upper extremity, now removed and now with a DVT present  -Continue IV antibiotics, Vanc/C Zosyn day #1  -Patient does have recent history of PICC line to the left upper extremity however cellulitis is more pronounced to the chest wall, also has acute left basilic DVT however does not specifically correlate with the edema noted to the chest wall and axilla--> may need further imaging if no improvement in symptoms    Left basilic DVT  -Status post PICC line  -PICC line removed  -Eliquis started--will need AC for at least 3 months based on current guideline recommendations--> high risk for bleed giving dual antiplatelet secondary to recent PCI as well as anticoagulation on NOAC now      CAD  Ischemic cardiomyopathy  -Recent [facility] with significant multivessel obstructive disease-->was not a surgical candidate   - status post PCI to left main/LAD with ISREAL on 4/19/2021  -Continues to follow with cardiology  -Continue ASA, Plavix, high intensity statin  -Continue home Lasix, beta-blocker    Recent Bradycardia  -Was recently evaluated by EP  - HR WNL on admission   -Recently had Lopressor changed to metoprolol  -Cardiac monitor for 4 weeks, started on recent discharge on 4/19    Recent uncomplicated diverticulitis  -Was treated with Rocephin and Flagyl, completed antibiotic course on 4/19    Morbid Obesity  - Body mass index is 48.92 kg/m². - Complicating assessment and treatment.  Placing patient at risk for multiple co-morbidities as well as early death and contributing to the patient's presentation.   - Counseled on weight loss.     COPD  - NO AE   - Continue home medications     DVT Prophylaxis: Eliquis  Diet: DIET LOW SODIUM 2 GM; 1800 ml   Code Status: Full Code       Placido Malagon PA-C  4/21/2021 6:20 PM

## 2021-04-21 NOTE — ED NOTES
Perfect serve message to Dr. Tonda Spurling @ [address] [place] [address]  04/21/21 1217    Marquise Tan returned the call to Dr. Samreen Servin @ [facility]  04/21/21 [phone]

## 2021-04-22 LAB
ANION GAP SERPL CALCULATED.3IONS-SCNC: 7 MMOL/L (ref 3–16)
BUN BLDV-MCNC: 8 MG/DL (ref 7–20)
CALCIUM SERPL-MCNC: 8.4 MG/DL (ref 8.3–10.6)
CHLORIDE BLD-SCNC: 105 MMOL/L (ref 99–110)
CO2: 26 MMOL/L (ref 21–32)
CREAT SERPL-MCNC: 0.7 MG/DL (ref 0.9–1.3)
EKG ATRIAL RATE: 102 BPM
EKG DIAGNOSIS: NORMAL
EKG P AXIS: 50 DEGREES
EKG P-R INTERVAL: 180 MS
EKG Q-T INTERVAL: 336 MS
EKG QRS DURATION: 74 MS
EKG QTC CALCULATION (BAZETT): 437 MS
EKG R AXIS: -66 DEGREES
EKG T AXIS: 54 DEGREES
EKG VENTRICULAR RATE: 102 BPM
GFR AFRICAN AMERICAN: >60
GFR NON-AFRICAN AMERICAN: >60
GLUCOSE BLD-MCNC: 102 MG/DL (ref 70–99)
HCT VFR BLD CALC: 34.6 % (ref 40.5–52.5)
HEMOGLOBIN: 11.4 G/DL (ref 13.5–17.5)
MAGNESIUM: 1.8 MG/DL (ref 1.8–2.4)
MCH RBC QN AUTO: 29.7 PG (ref 26–34)
MCHC RBC AUTO-ENTMCNC: 33 G/DL (ref 31–36)
MCV RBC AUTO: 90.1 FL (ref 80–100)
PDW BLD-RTO: 18.4 % (ref 12.4–15.4)
PLATELET # BLD: 156 K/UL (ref 135–450)
PMV BLD AUTO: 8.4 FL (ref 5–10.5)
POTASSIUM REFLEX MAGNESIUM: 3.5 MMOL/L (ref 3.5–5.1)
RBC # BLD: 3.84 M/UL (ref 4.2–5.9)
SODIUM BLD-SCNC: 138 MMOL/L (ref 136–145)
WBC # BLD: 10 K/UL (ref 4–11)

## 2021-04-22 PROCEDURE — 2580000003 HC RX 258: Performed by: NURSE PRACTITIONER

## 2021-04-22 PROCEDURE — 83735 ASSAY OF MAGNESIUM: CPT

## 2021-04-22 PROCEDURE — 93010 ELECTROCARDIOGRAM REPORT: CPT | Performed by: INTERNAL MEDICINE

## 2021-04-22 PROCEDURE — 36415 COLL VENOUS BLD VENIPUNCTURE: CPT

## 2021-04-22 PROCEDURE — 6360000002 HC RX W HCPCS: Performed by: INTERNAL MEDICINE

## 2021-04-22 PROCEDURE — 6370000000 HC RX 637 (ALT 250 FOR IP): Performed by: NURSE PRACTITIONER

## 2021-04-22 PROCEDURE — 6360000002 HC RX W HCPCS: Performed by: NURSE PRACTITIONER

## 2021-04-22 PROCEDURE — 93005 ELECTROCARDIOGRAM TRACING: CPT | Performed by: INTERNAL MEDICINE

## 2021-04-22 PROCEDURE — 6370000000 HC RX 637 (ALT 250 FOR IP): Performed by: INTERNAL MEDICINE

## 2021-04-22 PROCEDURE — 80048 BASIC METABOLIC PNL TOTAL CA: CPT

## 2021-04-22 PROCEDURE — 1200000000 HC SEMI PRIVATE

## 2021-04-22 PROCEDURE — 85027 COMPLETE CBC AUTOMATED: CPT

## 2021-04-22 PROCEDURE — 99254 IP/OBS CNSLTJ NEW/EST MOD 60: CPT | Performed by: INTERNAL MEDICINE

## 2021-04-22 PROCEDURE — 99232 SBSQ HOSP IP/OBS MODERATE 35: CPT | Performed by: INTERNAL MEDICINE

## 2021-04-22 RX ADMIN — Medication 10 ML: at 22:00

## 2021-04-22 RX ADMIN — TRAZODONE HYDROCHLORIDE 100 MG: 100 TABLET ORAL at 21:59

## 2021-04-22 RX ADMIN — Medication 1500 MG: at 14:51

## 2021-04-22 RX ADMIN — PIPERACILLIN SODIUM AND TAZOBACTAM SODIUM 3375 MG: 3; .375 INJECTION, POWDER, LYOPHILIZED, FOR SOLUTION INTRAVENOUS at 22:30

## 2021-04-22 RX ADMIN — APIXABAN 10 MG: 5 TABLET, FILM COATED ORAL at 09:36

## 2021-04-22 RX ADMIN — PIPERACILLIN SODIUM AND TAZOBACTAM SODIUM 3375 MG: 3; .375 INJECTION, POWDER, LYOPHILIZED, FOR SOLUTION INTRAVENOUS at 12:52

## 2021-04-22 RX ADMIN — Medication 1500 MG: at 01:39

## 2021-04-22 RX ADMIN — PIPERACILLIN SODIUM AND TAZOBACTAM SODIUM 3375 MG: 3; .375 INJECTION, POWDER, LYOPHILIZED, FOR SOLUTION INTRAVENOUS at 05:05

## 2021-04-22 RX ADMIN — ATORVASTATIN CALCIUM 80 MG: 40 TABLET, FILM COATED ORAL at 21:59

## 2021-04-22 RX ADMIN — HYDROCODONE BITARTRATE AND ACETAMINOPHEN 1 TABLET: 5; 325 TABLET ORAL at 01:38

## 2021-04-22 RX ADMIN — HYDROCODONE BITARTRATE AND ACETAMINOPHEN 1 TABLET: 5; 325 TABLET ORAL at 18:12

## 2021-04-22 RX ADMIN — ENOXAPARIN SODIUM 40 MG: 40 INJECTION SUBCUTANEOUS at 21:59

## 2021-04-22 RX ADMIN — SERTRALINE 100 MG: 100 TABLET, FILM COATED ORAL at 09:36

## 2021-04-22 RX ADMIN — Medication 10 ML: at 09:38

## 2021-04-22 RX ADMIN — FUROSEMIDE 40 MG: 40 TABLET ORAL at 09:36

## 2021-04-22 RX ADMIN — ASPIRIN 81 MG: 81 TABLET, FILM COATED ORAL at 09:36

## 2021-04-22 RX ADMIN — HYDROCODONE BITARTRATE AND ACETAMINOPHEN 1 TABLET: 5; 325 TABLET ORAL at 09:40

## 2021-04-22 RX ADMIN — CLOPIDOGREL BISULFATE 75 MG: 75 TABLET ORAL at 09:36

## 2021-04-22 ASSESSMENT — PAIN SCALES - GENERAL: PAINLEVEL_OUTOF10: 6

## 2021-04-22 ASSESSMENT — PAIN DESCRIPTION - DESCRIPTORS: DESCRIPTORS: SORE;DISCOMFORT

## 2021-04-22 ASSESSMENT — PAIN DESCRIPTION - ORIENTATION: ORIENTATION: LEFT

## 2021-04-22 ASSESSMENT — PAIN DESCRIPTION - PAIN TYPE: TYPE: ACUTE PAIN

## 2021-04-22 NOTE — CONSULTS
[facility]  [phone]        Reason for Consultation/Chief Complaint: \"I have been having fullness and pain left chest\". Consulted for basilic vein DVT    History of Present Illness:  Bao De Anda is a 64 y.o. patient who presented to [facility] 4/21/21 with c/o swelling and tenderness left upper chest. Normally sees Dr. Camilo Ng for cardiology--last OV 3/3/21. He has PMH of CAD s/p 2V CABG (LIMA-LAD, SVG-OM, and SVG-diagonal) in 9/20, s/p ISREAL diag and LM/LAD 4/19/21,COPD, anxiety, kidney stones, OANH, and hx ETOH/drug use. Most recent lexiscan myoview stress test on 4/12/21 abnormal. Subsequent C 4/14/2021 showed occluded SVGs. On 4/19/2021 he had impella assisted rotablator and ISREAL Diag and LM/LAD per Dr. Ruma Oconnell. Most recent ECHO 3/12/21 showed EF-45-50% (EF=55-60% in 9/20); more prominent inferior HK; indeterminate diastolic function. Originally presented in Sept 2020 with [place] and underwent devon nuc study which was abnormal leading to cath and eventual CABG. Most recent carotid doppler 9/20 <50% stenoses bilaterally. Now admitted with left basilic vein superficial venous thrombosis. Just d/c'd from [facility] and next day noted swelling and tenderness upper left chest region. Denied any chest pain, SOB, or palpitations recently. Note Upper Extremity confirmed acute superficial venous thrombosis involving left basilic vein. Started on Eliquis 10mg BID initially. . Admit EKG NSR; LV; PRWP; nonspecific ST change; inferior infarct (no change from prior EKG 4/14/21). Mary negative x 4; K+ 3.5; WCM=437. Patient with no complaints of chest pain, SOB, palpitations, dizziness, edema, or orthopnea/PND.  I have been asked to provide consultation regarding further management and testing      Past Medical History:   has a past medical history of Acute diverticulitis, Acute kidney injury ([address] Acute on chronic respiratory failure with hypoxemia ([facility] Acute respiratory failure ([address] Acute respiratory failure with hypoxia (HCC), Anxiety, Aortic valve calcification, CAD in native artery, Chronic obstructive pulmonary disease (HCC), Chronic systolic congestive heart failure (HCC), Class 3 severe obesity with body mass index (BMI) of 45.0 to 49.9 in adult [facility]), Coronary artery calcification, Coronary artery disease involving native heart with angina pectoris ([facility] Crush injury of right foot, DDD (degenerative disc disease), lumbar, Depression, Diverticulitis of colon, Erectile dysfunction, Fatigue, History of alcohol abuse, History of drug use, HNP (herniated nucleus pulposus), lumbar, Hyperglycemia, Hyperlipidemia, Hypersomnia, Insomnia, Ischemic cardiomyopathy, Kidney stone, Lumbar radiculopathy, Lumbar spine pain, LVH (left ventricular hypertrophy), Mobitz type I Wenckebach atrioventricular block, Moderate protein-calorie malnutrition ([facility] Observed sleep apnea, OANH (obstructive sleep apnea), Pneumonia, primary atypical, Reactive depression, S/P three vessel coronary artery bypass, Spondylosis without myelopathy or radiculopathy, lumbar region, Tobacco abuse, and Tobacco use. Surgical History:   has a past surgical history that includes Cholecystectomy; Cystoscopy (03/16/2011); Pain management procedure (Right, 12/24/2019); Pain management procedure (Right, 1/7/2020); Coronary artery bypass graft (N/A, 9/25/2020); and Finger amputation (Right, 2/2/2021). Social History:   reports that he quit smoking about 13 months ago. His smoking use included cigarettes. He has a 70.00 pack-year smoking history. He has never used smokeless tobacco. He reports previous drug use. He reports that he does not drink alcohol. Family History:  family history includes Heart Disease in his mother; High Blood Pressure in his sister; Liver Disease in his father; No Known Problems in his sister; Other in his mother. Home Medications:  Were reviewed and are listed in nursing record.  and/or listed below  Prior to Admission medications    Medication Sig Start Date End Date Taking?  Authorizing Provider   furosemide (LASIX) 80 MG tablet Take 0.5 tablets by mouth daily 4/20/21 4/20/22 Yes Damian Setting, APRN - CNP   metoprolol succinate (TOPROL XL) 25 MG extended release tablet Take 1 tablet by mouth daily 4/20/21  Yes Damian SettingANICETO CNP   ipratropium (ATROVENT) 0.02 % nebulizer solution Take 2.5 mLs by nebulization 3 times daily 3/2/21  Yes ANICETO Lang CNP   traZODone (DESYREL) 100 MG tablet Take 1 tablet by mouth nightly  Patient taking differently: Take 100 mg by mouth 2 times daily Am pm 3/1/21  Yes ANICETO Lang CNP   sertraline (ZOLOFT) 100 MG tablet Take 1 tablet by mouth daily 3/1/21  Yes ANICETO Lang CNP   clopidogrel (PLAVIX) 75 MG tablet Take 1 tablet by mouth daily 1/13/21  Yes Lori Haynes MD   atorvastatin (LIPITOR) 80 MG tablet Take 1 tablet by mouth nightly 10/28/20  Yes Lori Haynes MD   aspirin 81 MG EC tablet Take 1 tablet by mouth daily 9/30/20  Yes ANICETO Mcrae CNP   albuterol (PROVENTIL) (2.5 MG/3ML) 0.083% nebulizer solution Take 3 mLs by nebulization every 6 hours as needed for Wheezing or Shortness of Breath 9/25/19  Yes ANICETO Lang CNP   albuterol sulfate HFA (PROVENTIL HFA) 108 (90 Base) MCG/ACT inhaler Inhale 2 puffs into the lungs every 6 hours as needed for Wheezing 5/17/19  Yes Minesh Alexander MD   Handicap [facility] by Does not apply route Duration - 5 years 3/1/21   ANICETO Lang CNP        Allergies:  Dilaudid [hydromorphone hcl] and Codeine     Review of Systems:   12 point ROS negative in all areas as listed below except as in [place]  Constitutional, EENT, Cardiovascular, pulmonary, GI, , Musculoskeletal, skin, neurological, hematological, endocrine, Psychiatric    Physical Examination:    Vitals:    04/22/21 0523   BP: 116/75   Pulse: 82   Resp: 18   Temp: 98.1 °F (36.7 °C)   SpO2: 92%    Weight: 294 lb (133.4 kg) ECG of 21-APR-2021 08:17,No significant change was found     ECHO (limited) 3/12/21  Summary   Limited echo for left ventricle systolic function. Definity is used for   myocardial border enhancement. LV systolic function is mildly reduced with a visually estimated EF=45-50%. The left ventricle is normal in size with normal wall thickness. More prominent Inferior HK on certain views. Indeterminate diastolic function. Stress Test 4/12/21  Summary  Large area, reversible, moderate to severe intensity perfusion defect  involving the basal to apical anterolateral, mid to apical anterior and  apex. Mixed ischemia/scar of the inferolateral territory. Decreased wall  motion and myocardial thickening of the above segments. Post-stress LVEF  visually estimated 45-50%. Overall findings represent a high risk scan. Cardiac Cath 4/19/21  Left heart catheterization     LVEDP  5     See diagnostic catheterization report for full details, would add that there is severe tortuosity and eccentric plaque within the left main, LAD and D1 which proximal to mid was 75% and distally was 99%. CONCLUSIONS:      Successful rotational atherectomy and PCI of D1 with single drug-eluting stent  Successful rotational atherectomy and PCI of left main/LAD with single drug-eluting stent  Medical therapy for LCx , this lesion does not appear to be amenable for PCI      Assessment:  Hanane Marvin is a 64 y.o. patient who presented to [facility] 4/21/21 with c/o swelling and tenderness left upper chest. Normally sees Dr. Janis Zabala for cardiology--last OV 3/3/21. He has PMH of CAD s/p 2V CABG (LIMA-LAD, SVG-OM, and SVG-diagonal) in 9/20, s/p ISREAL diag and LM/LAD 4/19/21,COPD, anxiety, kidney stones, OANH, and hx ETOH/drug use. Most recent lexiscan myoview stress test on 4/12/21 abnormal. Subsequent C 4/14/2021 showed occluded SVGs. On 4/19/2021 he had impella assisted rotablator and ISREAL Diag and LM/LAD per Dr. Helane Paget.  Most recent ECHO 3/12/21 showed EF-45-50% (EF=55-60% in 9/20); more prominent inferior HK; indeterminate diastolic function. Originally presented in Sept 2020 with [place] and underwent devon nuc study which was abnormal leading to cath and eventual CABG. Most recent carotid doppler 9/20 <50% stenoses bilaterally. Now admitted with left basilic vein superficial venous thrombosis. Just d/c'd from [facility] and next day noted swelling and tenderness upper left chest region. Denied any chest pain, SOB, or palpitations recently. Note Upper Extremity confirmed acute superficial venous thrombosis involving left basilic vein. Started on Eliquis 10mg BID initially. . Admit EKG NSR; LV; PRWP; nonspecific ST change; inferior infarct (no change from prior EKG 4/14/21). Mary negative x 4; K+ 3.5; XPV=670. Diagnosis of left superficial basilic venous thrombosis related to recent PICC line in middle-aged male with recent PCI for significant CAD with symptoms and abnormal nuc study. Recs:  1. No concern for angina, CHF, or significant arrhythmia. 2. Note I reviewed tele showing Olvin Gil which was noted at [facility]. He was d/c'd with event monitor for 30 days and is wearing currently. 3. No need for cardiac testing at this time. 4. Continue DAPT and rest of cardiac regimen as prescribed. 5. No need for eliquis given superficial venous thrombus. OK for d/c home from cardiology standpoint if OK with IM doc. He has f/u appt 4/28/21 in office and should continue to wear monitor and make battery charged and working when he gets home. Signing off. Thanks.      Patient Active Problem List   Diagnosis    Crush injury of right foot    Shortness of breath    Chest pain    Erectile dysfunction    Hyperglycemia    Anxiety    Depression    Tobacco abuse    History of alcohol abuse    Fatigue    OANH (obstructive sleep apnea)    Hypersomnia    Chronic obstructive pulmonary disease (HCC)    Lumbar radiculopathy    HNP (herniated nucleus

## 2021-04-22 NOTE — PLAN OF CARE
Problem: Safety:  Goal: Free from accidental physical injury  Description: Free from accidental physical injury  Outcome: Ongoing     Problem: Daily Care:  Goal: Daily care needs are met  Description: Daily care needs are met  Outcome: Ongoing     Problem: Pain:  Goal: Patient's pain/discomfort is manageable  Description: Patient's pain/discomfort is manageable  Outcome: Ongoing

## 2021-04-22 NOTE — CARE COORDINATION
Case Management Assessment  Initial Evaluation      Patient Name: Arletha Pallas  YOB: 1960  Diagnosis: Cellulitis [L03.90]  Cellulitis of chest wall [B48.402]  Date / Time: 4/21/2021  8:08 AM    Admission status/Date:4/21/2021  Chart Reviewed: Yes      Patient Interviewed: Yes   Family Interviewed:  No      Hospitalization in the last 30 days:  Yes      Health Care Decision Maker :   Primary Decision Maker: HueyYulisa - Spouse - [phone]    (CM - must 1st enter selection under Navigator - emergency contact- Health Care Decision Maker Relationship and pick relationship)   Who do you trust or have selected to make healthcare decisions for you      Met with: pt  Interview conducted  (bedside/phone):bedside    Current PCP:   Rahul HERNANDEZ  Support Systems/Care Needs: Spouse/Significant Other, Other (Comment), Family Members, Children  Transportation: self    Meal Preparation: self    Housing  Living Arrangements: lives in 1 story house with spouse  Steps: IPTA  Intent for return to present living arrangements: Yes  Identified Issues:     [address] : No Agency:(Services)  Type of Home Care Services: None  Passport/Waiver : No  :                      Phone Number:    Passport/Waiver Services:           Durable Medical Equiptment   DME Provider: none  Equipment:   Walker___Cane___RTS___ BSC___Shower Chair___Hospital Bed___W/C____Other________  02 at __x-prn__Liter(s)---wears(frequency)_______ HHN _x__ CPAP___ BiPap___   N/A____      Home O2 Use :  Yes-prn  If No for home O2---if presently on O2 during hospitalization:  No      Community Service Affiliation  Dialysis:  No    · Agency:  · Location:  · Dialysis Schedule:  · Phone:   · Fax:     Other Community Services: (ex:PT/OT,Mental Health,Wound Clinic, [address] [place] [address] Patriciaar)    DISCHARGE PLAN: Explained Case Management role/services. Chart reviewed. Met with pt at bedside. Pt is from house with spouse and plans to return. Pt is IPTA and drives. Pt ambulates without device. Pt states he has home O2 he bought out of pocket and uses PRN, currently on RA. Pt states he will discharge home tomorrow on PO abx. Will follow.

## 2021-04-22 NOTE — CARE COORDINATION
Advance Care Planning   Healthcare Decision Maker:    Primary Decision Maker: Poppy Bound - Spouse - [phone]    Click here to complete Healthcare Decision Makers including selection of the Healthcare Decision Maker Relationship (ie \"Primary\").

## 2021-04-22 NOTE — PROGRESS NOTES
Handoff report and transfer of care given to [facility]. Pt in stable condition. Call light within reach. Bed locked in lowest position. Denies further needs at this time.

## 2021-04-22 NOTE — PROGRESS NOTES
Progress Note    Admit Date:  4/21/2021    Subjective:  Mr. Sandra Reyna continues to have swelling left chest wall. No fever    HR has been fluctuating from the low 30s and 110 on the monitor     Objective:   Patient Vitals for the past 4 hrs:   BP Temp Temp src Pulse Resp SpO2   04/22/21 0523 116/75 98.1 °F (36.7 °C) Axillary 82 18 92 %            Intake/Output Summary (Last 24 hours) at 4/22/2021 0916  Last data filed at 4/22/2021 0506  Gross per 24 hour   Intake 872 ml   Output --   Net 872 ml       Physical Exam:    Gen: Obese male, no distress. Alert. Eyes: No conjunctival injection. ENT: No discharge. Pharynx clear. Neck: Trachea midline. Resp: No accessory muscle use. No crackles. No wheezes. No rhonchi. CV: Regular rate. Regular rhythm. No murmur. No rub.  + Edema. Large, well-healed scar to the midline chest  Capillary Refill: Brisk,< 3 seconds   Peripheral Pulses: +2 palpable, equal bilaterally   GI: Non-tender. Non-distended. Normal bowel sounds. Skin: Swelling across left anterior chest wall extending into the left axilla with significant edema and tenderness to palpation, bruising to left upper extremity at the area of the PICC site, clean, dry and intact dressings present to the bilateral groin from recent left heart cath into the right wrist  M/S: Tenderness to palpation to left proximal upper extremity as well as left anterior chest wall, no crepitus  Neuro: Awake. Grossly nonfocal    Psych: Oriented x 3. No anxiety or agitation.      Scheduled Meds:   aspirin  81 mg Oral Daily    atorvastatin  80 mg Oral Nightly    clopidogrel  75 mg Oral Daily    furosemide  40 mg Oral Daily    metoprolol succinate  25 mg Oral Daily    sertraline  100 mg Oral Daily    traZODone  100 mg Oral Nightly    sodium chloride flush  5-40 mL Intravenous 2 times per day    apixaban  10 mg Oral BID    piperacillin-tazobactam  3,375 mg Intravenous Q8H    apixaban  1 Package Oral RX Placeholder    acetaminophen  650 mg Oral Once    vancomycin  1,500 mg Intravenous Q12H       Continuous Infusions:   sodium chloride         PRN Meds:  sodium chloride flush, sodium chloride, promethazine **OR** ondansetron, polyethylene glycol, acetaminophen **OR** acetaminophen, ipratropium, albuterol, HYDROcodone 5 mg - acetaminophen      Data:  CBC:   Recent Labs     04/20/21  0600 04/21/21  0855 04/22/21  0537   WBC 12.3* 11.4* 10.0   HGB 11.6* 12.6* 11.4*   HCT 35.4* 38.3* 34.6*   MCV 88.4 89.6 90.1    175 156     BMP:   Recent Labs     04/20/21  0600 04/21/21  0855 04/22/21  0537    138 138   K 3.7  3.7 3.6 3.5    103 105   CO2 29 28 26   BUN 9 13 8   CREATININE 0.8* 0.8* 0.7*     LIVER PROFILE:   Recent Labs     04/21/21  0855   AST 19   ALT 18   BILITOT <0.2   ALKPHOS 70     CULTURES    Blood cx x2: pending    SARS-COV-2 - Rapid: Not detected      RADIOLOGY    VL Extremity Venous Left   Final Result   Acute superficial venous thrombosis involving the left basilic vein. CTA PULMONARY W CONTRAST   Final Result   1. Moderate subcutaneous inflammatory stranding throughout the left upper   chest wall axilla either due to cellulitis or edema; correlate with physical   exam.         XR CHEST PORTABLE   Final Result   No acute cardiopulmonary disease. VASCULAR REPORT    (Results Pending)         Assessment/Plan:    Cellulitis left chest wall  - s/p PICC line in [facility], now removed and now w/ a DVT present  - Vanc/ Zosyn D#2  - pt does have recent hx of PICC line to E however cellulitis is more pronounced to the chest wall, also has acute left basilic DVT,however does not specifically correlate with the edema noted to the chest wall and axilla--> may need further imaging if no improvement in symptoms. Discussed with radiologist Dr. Tabatha Mendez.   He does not feel any other imaging is going to help with the management of this patient.     Left basilic SVT  - s/p PICC line  - PICC line removed  He's not very symptomatic   No indication for anticoagulation    CAD  Ischemic cardiomyopathy  - Recent [facility] with significant multivessel obstructive disease--> was not a surgical candidate   - status post PCI to left main/LAD with ISREAL on 4/19/2021  - Continues to follow with cardiology  - Continue ASA, Plavix, high intensity statin  - Continue home Lasix, beta-blocker    COPD  - NO AE   - Continue home medications      Recent Bradycardia  - Was recently evaluated by EP  - HR WNL on admission   - Recently had Lopressor changed to metoprolol  - Cardiac monitor for 4 wks, started on recent discharge on 4/19  Fluctuating HR on the monitor    Recent uncomplicated diverticulitis  - Was treated with Rocephin and Flagyl, completed antibiotic course on 4/19     Morbid Obesity  - Body mass index is 48.92 kg/m². - Complicating assessment and treatment. Placing patient at risk for multiple co-morbidities as well as early death and contributing to the patient's presentation.   - Counseled on weight loss. DVT Prophylaxis: lovenox  Diet: DIET LOW SODIUM 2 GM; 1800 ml  Code Status: Full Code    DOUG Muller

## 2021-04-22 NOTE — PROGRESS NOTES
Shift assessment complete; see flow sheet. Pt A/O x4, calm, cooperative. Scheduled medications administered; See MAR. IV flushed and infusing without difficulty. Pt c/o left side pain 8/10 PRN. Pt denies any needs at this time. Call light within reach, bed in low locked position.

## 2021-04-23 VITALS
TEMPERATURE: 97.4 F | BODY MASS INDEX: 49.85 KG/M2 | HEART RATE: 86 BPM | HEIGHT: 64 IN | RESPIRATION RATE: 16 BRPM | DIASTOLIC BLOOD PRESSURE: 86 MMHG | OXYGEN SATURATION: 95 % | WEIGHT: 292 LBS | SYSTOLIC BLOOD PRESSURE: 141 MMHG

## 2021-04-23 PROCEDURE — 99239 HOSP IP/OBS DSCHRG MGMT >30: CPT | Performed by: INTERNAL MEDICINE

## 2021-04-23 PROCEDURE — 6370000000 HC RX 637 (ALT 250 FOR IP): Performed by: INTERNAL MEDICINE

## 2021-04-23 PROCEDURE — 2580000003 HC RX 258: Performed by: NURSE PRACTITIONER

## 2021-04-23 PROCEDURE — 6360000002 HC RX W HCPCS: Performed by: NURSE PRACTITIONER

## 2021-04-23 PROCEDURE — 6370000000 HC RX 637 (ALT 250 FOR IP): Performed by: NURSE PRACTITIONER

## 2021-04-23 RX ORDER — CLINDAMYCIN HYDROCHLORIDE 300 MG/1
300 CAPSULE ORAL 4 TIMES DAILY
Qty: 16 CAPSULE | Refills: 0 | Status: SHIPPED | OUTPATIENT
Start: 2021-04-23 | End: 2021-04-23 | Stop reason: SDUPTHER

## 2021-04-23 RX ORDER — LACTOBACILLUS RHAMNOSUS GG 10B CELL
1 CAPSULE ORAL 2 TIMES DAILY
Qty: 10 CAPSULE | Refills: 0 | Status: SHIPPED | OUTPATIENT
Start: 2021-04-23 | End: 2021-04-23 | Stop reason: SDUPTHER

## 2021-04-23 RX ORDER — LACTOBACILLUS RHAMNOSUS GG 10B CELL
1 CAPSULE ORAL 2 TIMES DAILY
Qty: 12 CAPSULE | Refills: 0 | Status: SHIPPED | OUTPATIENT
Start: 2021-04-23 | End: 2021-04-29

## 2021-04-23 RX ORDER — CLINDAMYCIN HYDROCHLORIDE 300 MG/1
300 CAPSULE ORAL 4 TIMES DAILY
Qty: 24 CAPSULE | Refills: 0 | Status: SHIPPED | OUTPATIENT
Start: 2021-04-23 | End: 2021-04-29

## 2021-04-23 RX ADMIN — CLOPIDOGREL BISULFATE 75 MG: 75 TABLET ORAL at 09:22

## 2021-04-23 RX ADMIN — Medication 10 ML: at 09:23

## 2021-04-23 RX ADMIN — ASPIRIN 81 MG: 81 TABLET, FILM COATED ORAL at 09:22

## 2021-04-23 RX ADMIN — HYDROCODONE BITARTRATE AND ACETAMINOPHEN 1 TABLET: 5; 325 TABLET ORAL at 03:14

## 2021-04-23 RX ADMIN — METOPROLOL SUCCINATE 25 MG: 25 TABLET, EXTENDED RELEASE ORAL at 09:22

## 2021-04-23 RX ADMIN — SERTRALINE 100 MG: 100 TABLET, FILM COATED ORAL at 09:22

## 2021-04-23 RX ADMIN — PIPERACILLIN SODIUM AND TAZOBACTAM SODIUM 3375 MG: 3; .375 INJECTION, POWDER, LYOPHILIZED, FOR SOLUTION INTRAVENOUS at 05:25

## 2021-04-23 RX ADMIN — Medication 1500 MG: at 03:14

## 2021-04-23 RX ADMIN — FUROSEMIDE 40 MG: 40 TABLET ORAL at 09:22

## 2021-04-23 NOTE — DISCHARGE INSTR - COC
Continuity of Care Form    Patient Name: Nathalia Moore   :  1960  MRN:  [id number]    Admit date:  2021  Discharge date:  ***    Code Status Order: Full Code   Advance Directives:     Admitting Physician:  Isabelle Barry MD  PCP: ANICETO Brown CNP    Discharging Nurse: [facility] Unit/Room#: 0227/0227-02  Discharging Unit Phone Number: ***    Emergency Contact:   Extended Emergency Contact Information  Primary Emergency Contact: Yulisa Arzate  Address: [place] [address].           [address] Phone: [phone]  Mobile Phone: [phone]  Relation: Spouse    Past Surgical History:  Past Surgical History:   Procedure Laterality Date    CHOLECYSTECTOMY      CORONARY ARTERY BYPASS GRAFT N/A 2020    CORONARY ARTERY BYPASS GRAFTING X3, INTERNAL MAMMARY ARTERY, SAPHENOUS VEIN GRAFT, ON PUMP, STERNAL PLATING, 5 LEVEL BILATERAL INTERCOSTAL NERVE BLOCK, PLATELET GEL APPLICATION performed by Evan Pastrana MD at [address] [facility]  2011    cystoscopy left ureteroscopy, left retrograde, double J stent placement    FINGER AMPUTATION Right 2021    RIGHT MIDDLE FINGER IRRIGATION AND DEBRIDEMENT WITH REVISION AMPUTATION AND BONE SHORTENING, POSSIBLE NAIL ABLATION performed by Gifty King MD at [facility] Right 2019    RIGHT LUMBAR FIVE SACRAL ONE TRANSFORAMINAL EPIDURAL STEROID INJECTION SITE CONFIRMED BY FLUOROSCOPY performed by Black Coronel MD at [address] [facility] Right 2020    RIGHT LUMBAR FOUR AND LUMBAR FIVE TRANSFORAMINAL EPIDURAL STEROID INJECTION SITE CONFIRMED BY FLUOROSCOPY performed by Black Coronel MD at [place] [address]       Immunization History:   Immunization History   Administered Date(s) Administered    Influenza, Quadv, IM, PF (6 mo and older Fluzone, Flulaval, Fluarix, and 3 yrs and older Afluria) 2020, 03/01/2021    Tdap (Boostrix, Adacel) 07/19/2015, 01/22/2021       Active Problems:  Patient Active Problem List   Diagnosis Code    Crush injury of right foot S97.81XA    Shortness of breath R06.02    Chest pain R07.9    Erectile dysfunction N52.9    Hyperglycemia R73.9    Anxiety F41.9    Depression F32.9    Tobacco abuse Z72.0    History of alcohol abuse F10.11    Fatigue R53.83    OANH (obstructive sleep apnea) G47.33    Hypersomnia G47.10    Chronic obstructive pulmonary disease (HCC) J44.9    Lumbar radiculopathy M54.16    HNP (herniated nucleus pulposus), lumbar M51.26    Spondylosis without myelopathy or radiculopathy, lumbar region M47.816    DDD (degenerative disc disease), lumbar M51.36    Lumbar spine pain M54.5    Acute respiratory distress R06.03    Class 3 severe obesity with body mass index (BMI) of 45.0 to 49.9 in adult ([facility]) E66.01, Z68.42    Pneumonia, primary atypical J18.9    Acute respiratory failure with hypoxia ([facility]) J96.01    Moderate protein-calorie malnutrition ([facility]) E44.0    Acute respiratory failure (HCC) J96.00    Acute on chronic respiratory failure with hypoxemia ([facility]) J96.21    Acute diastolic congestive heart failure ([facility]) I50.31    Hyperlipidemia E78.5    Insomnia G47.00    Morbid obesity with BMI of 45.0-49.9, adult ([facility]) E66.01, Z68.42    Abnormal cardiovascular stress test R94.39    Coronary artery disease involving native heart I25.10    S/P three vessel coronary artery bypass Z95.1    Acute kidney injury ([facility] [address] N17.9    Acute diverticulitis K57.92    Chronic combined systolic and diastolic congestive heart failure ([facility]) I50.42    Mobitz type I Wenckebach atrioventricular block I44.1    Asystole ([facility]) I46.9    Ischemic cardiomyopathy I25.5    Diverticulitis of colon K57.32    Coronary artery disease involving native coronary artery of native heart with unstable angina pectoris ([facility]) I25.110    CHB (complete heart block) ([facility]) I44.2    Sinus bradycardia R00.1    Cellulitis L03.90    Acute deep vein thrombosis (DVT) of left upper extremity (HCC) I82.622    Cellulitis of chest wall L03.313       Isolation/Infection:   Isolation          No Isolation        Patient Infection Status     Infection Onset Added Last Indicated Last Indicated By Review Planned Expiration Resolved Resolved By    None active    Resolved    COVID-19 Rule Out 21 COVID-19, Rapid (Ordered)   21 Rule-Out Test Resulted    COVID-19 Rule Out 21 COVID-19 (Ordered)   21 Rule-Out Test Resulted    C-diff Rule Out 20 C. difficile toxin Molecular (Ordered)   20 Rule-Out Test Resulted    C-diff Rule Out 20 Clostridium difficile toxin/antigen (Ordered)   20 Rule-Out Test Resulted    COVID-19 Rule Out 20 COVID-19 (Ordered)   20 Rule-Out Test Resulted    COVID-19 Rule Out 20 COVID-19 (Ordered)   20 Rule-Out Test Resulted    INFLUENZA  20 Elsie Jackson RN   20           Nurse Assessment:  Last Vital Signs: BP (!) 141/86   Pulse 86   Temp 97.4 °F (36.3 °C) (Oral)   Resp 16   Ht 5' 4\" (1.626 m)   Wt 292 lb (132.5 kg)   SpO2 95%   BMI 50.12 kg/m²     Last documented pain score (0-10 scale): Pain Level: 5  Last Weight:   Wt Readings from Last 1 Encounters:   21 292 lb (132.5 kg)     Mental Status:  {IP PT MENTAL STATUS:}    IV Access:  { ABI IV ACCESS:168021862}    Nursing Mobility/ADLs:  Walking   {CHP DME WMIO:527828684}  Transfer  {CHP DME OIRB:488588376}  Bathing  {CHP DME HSKN:956727694}  Dressing  {CHP DME DLPC:085020743}  Toileting  {CHP DME UFID:159472459}  Feeding  {[facility] DME WIC}  Med Admin  {[facility] DME BZET:148528660}  Med Delivery   {[facility] MED Delivery:296007545}    Wound Care Documentation and Therapy:        Elimination:  Continence:   · Bowel: {YES / TM:33064}  · Bladder: {YES / SI:48176}  Urinary Catheter: {Urinary Catheter:345933012}   Colostomy/Ileostomy/Ileal Conduit: {YES / GL:91028}       Date of Last BM: ***    Intake/Output Summary (Last 24 hours) at 2021 1206  Last data filed at 2021 0521  Gross per 24 hour   Intake 1509 ml   Output --   Net 1509 ml     I/O last 3 completed shifts: In: 6932 [P.O.:600;  I.V.:909]  Out: -     Safety Concerns:     [address] [employer] Safety Concerns:925426902}    Impairments/Disabilities:      [address] [facility] Impairments/Disabilities:370203858}    Nutrition Therapy:  Current Nutrition Therapy:   [facility] [employer] Diet List:878880397}    Routes of Feeding: {CHP DME Other Feedings:498220777}  Liquids: {Slp liquid thickness:41221}  Daily Fluid Restriction: {CHP DME Yes amt example:607188287}  Last Modified Barium Swallow with Video (Video Swallowing Test): {Done Not Done TKCF:677169639}    Treatments at the Time of Hospital Discharge:   Respiratory Treatments: ***  Oxygen Therapy:  {Therapy; copd oxygen:71315}  Ventilator:    { CC Vent HYYH:535910614}    Rehab Therapies: {THERAPEUTIC INTERVENTION:5787273634}  Weight Bearing Status/Restrictions: [facility] Weight Bearin}  Other Medical Equipment (for information only, NOT a DME order):  {EQUIPMENT:878019997}  Other Treatments: ***    Patient's personal belongings (please select all that are sent with patient):  {CHP DME Belongings:067935692}    RN SIGNATURE:  {Esignature:301882463}    CASE MANAGEMENT/SOCIAL WORK SECTION    Inpatient Status Date: ***    Readmission Risk Assessment Score:  Readmission Risk              Risk of Unplanned Readmission:        20           Discharging to Facility/ Agency   · Name:   · Address:  · Phone:  · Fax:    Dialysis Facility (if applicable)   · Name:  · Address:  · Dialysis Schedule:  · Phone:  · Fax:    / signature: {Esignature:543858227}    PHYSICIAN SECTION    Prognosis: {Prognosis:6110948760}    Condition at Discharge: [facility]

## 2021-04-23 NOTE — PROGRESS NOTES
Physician Progress Note      PATIENT:               Raquel Jose  CSN #:                  [id number]  :                       1960  ADMIT DATE:       2021 8:08 AM  DISCH DATE:        2021 12:52 PM  RESPONDING  PROVIDER #:        Gareth Dickey MD          QUERY TEXT:    Pt admitted with DVT and cellulitis of chest wall. Noted documentation of    Diagnosis of left superficial basilic venous thrombosis related to recent PICC   line per cardiology consultant. If possible, please document in progress   notes and discharge summary:    The medical record reflects the following:  Risk Factors: Left superficial basilic venous thrombosis, recent PICC line  Clinical Indicators: H&P- Cellulitis left chest wall -Status post PICC line in   the left upper extremity, now removed and now with a DVT present    Cardiology notes-  Diagnosis of left superficial basilic venous thrombosis   related to recent PICC line in middle-aged male with recent PCI for   significant CAD with symptoms and abnormal nuc study. No need for eliquis   given superficial venous thrombus    Treatment: Eliquis started, monitor mages, cardiology consult    Thank You Jessie Armenta RN, CDS [email or web address]  Options provided:  -- Left superficial basilic venous thrombosis related to recent PICC line   confirmed present on admission  -- Left superficial basilic venous thrombosis unrelated to recent PICC line  -- Other - I will add my own diagnosis  -- Disagree - Not applicable / Not valid  -- Disagree - Clinically unable to determine / Unknown  -- Refer to Clinical Documentation Reviewer    PROVIDER RESPONSE TEXT:    The diagnosis of left superficial basilic venous thrombosis related to recent   PICC line was confirmed as present on admission.     Query created by: Oliva Paul on 2021 2:02 PM      Electronically signed by:  Gareth Dickey MD 2021 2:26 PM

## 2021-04-23 NOTE — DISCHARGE SUMMARY
and intact dressings present to the bilateral groin from recent left heart cath into the right wrist  M/S: Tenderness to palpation to left proximal upper extremity as well as left anterior chest wall, no crepitus  Neuro: Awake. Grossly nonfocal    Psych: Oriented x 3. No anxiety or agitation. CBC:   Recent Labs     04/21/21  0855 04/22/21  0537   WBC 11.4* 10.0   HGB 12.6* 11.4*   HCT 38.3* 34.6*   MCV 89.6 90.1    156     BMP:   Recent Labs     04/21/21  0855 04/22/21  0537    138   K 3.6 3.5    105   CO2 28 26   BUN 13 8   CREATININE 0.8* 0.7*     LIVER PROFILE:   Recent Labs     04/21/21  0855   AST 19   ALT 18   BILITOT <0.2   ALKPHOS 70     CULTURES    Blood cx x2: NGTD    SARS-COV-2 - Rapid: Not detected     RADIOLOGY    VL Extremity Venous Left 4/21/2021   Final Result   Summary        Acute superficial venous thrombosis involving the left basilic vein. CTA PULMONARY W CONTRAST 4/21/2021   Final Result   1. Moderate subcutaneous inflammatory stranding throughout the left upper   chest wall axilla either due to cellulitis or edema; correlate with physical   exam.         XR CHEST PORTABLE 4/21/2021   Final Result   No acute cardiopulmonary disease. VASCULAR REPORT    (Results Pending)     Discharge Medications     Medication List      START taking these medications    clindamycin 300 MG capsule  Commonly known as: CLEOCIN  Take 1 capsule by mouth 4 times daily for 6 days  Notes to patient: Clindamycin (Cleocin®)           Use: treat infections. Side effects: belly pain, nausea, loose stools   or vaginal yeast infections in females.      lactobacillus capsule  Take 1 capsule by mouth 2 times daily for 6 days        CHANGE how you take these medications    traZODone 100 MG tablet  Commonly known as: DESYREL  Take 1 tablet by mouth nightly  What changed:   · when to take this  · additional instructions        CONTINUE taking these medications    * albuterol sulfate  (90 Base) MCG/ACT inhaler  Commonly known as: Proventil HFA  Inhale 2 puffs into the lungs every 6 hours as needed for Wheezing     * albuterol (2.5 MG/3ML) 0.083% nebulizer solution  Commonly known as: PROVENTIL  Take 3 mLs by nebulization every 6 hours as needed for Wheezing or Shortness of Breath     aspirin 81 MG EC tablet  Take 1 tablet by mouth daily     atorvastatin 80 MG tablet  Commonly known as: LIPITOR  Take 1 tablet by mouth nightly     clopidogrel 75 MG tablet  Commonly known as: PLAVIX  Take 1 tablet by mouth daily     furosemide 80 MG tablet  Commonly known as: Lasix  Take 0.5 tablets by mouth daily     Handicap Placard Misc  by Does not apply route Duration - 5 years     ipratropium 0.02 % nebulizer solution  Commonly known as: ATROVENT  Take 2.5 mLs by nebulization 3 times daily     metoprolol succinate 25 MG extended release tablet  Commonly known as: Toprol XL  Take 1 tablet by mouth daily     sertraline 100 MG tablet  Commonly known as: Zoloft  Take 1 tablet by mouth daily         * This list has 2 medication(s) that are the same as other medications prescribed for you. Read the directions carefully, and ask your doctor or other care provider to review them with you. Where to Get Your Medications      These medications were sent to [facility] [address] [facility] [address] [facility] [phone] [facility] [phone]  [address] [facility] [address] [facility] [address]    Phone: [phone]   · clindamycin 300 MG capsule  · lactobacillus capsule           Discharged in stable condition to home. Follow Up: Follow up with PCP in 1 week. EARL Muller.

## 2021-04-23 NOTE — CARE COORDINATION
DISCHARGE ORDER  Date/Time 2021 12:12 PM  Completed by: Mallory Ochoa, Case Management    Patient Name: Thomas Dotson      : 1960  Admitting Diagnosis: Cellulitis [L03.90]  Cellulitis of chest wall [O72.783]      Admit order Date and Status:2021  (verify MD's last order for status of admission)      Noted discharge order. If applicable PT/OT recommendation at Discharge: NA  DME recommendation by PT/OT:NA  Confirmed discharge plan: Yes  with whom__pt_____________  If pt confirmed DC plan does family need to be contacted by CM No if yes who______  Discharge Plan: Chart reviewed. Pt discharging home with spouse and declines HHC needs. Reviewed chart. Role of discharge planner explained and patient verbalized understanding. Discharge order is noted. Has Home O2 in place on admit:  No    Pt is being d/c'd to home today. Pt's O2 sats are 95% on RA. Discharge timeout done with Triny Hunt. All discharge needs and concerns addressed.

## 2021-04-23 NOTE — PLAN OF CARE
Problem: Infection:  Goal: Will remain free from infection  Description: Will remain free from infection  4/23/2021 1201 by Isela Ch RN  Outcome: Completed  4/23/2021 0018 by Edd Granger RN  Outcome: Ongoing     Problem: Safety:  Goal: Free from accidental physical injury  Description: Free from accidental physical injury  4/23/2021 1201 by Isela Ch RN  Outcome: Completed  4/23/2021 0018 by Edd Granger RN  Outcome: Ongoing  Goal: Free from intentional harm  Description: Free from intentional harm  4/23/2021 1201 by Isela Ch RN  Outcome: Completed  4/23/2021 0018 by Edd Granger RN  Outcome: Ongoing     Problem: Daily Care:  Goal: Daily care needs are met  Description: Daily care needs are met  4/23/2021 1201 by Isela Ch RN  Outcome: Completed  4/23/2021 0018 by Edd Granger RN  Outcome: Ongoing     Problem: Pain:  Goal: Patient's pain/discomfort is manageable  Description: Patient's pain/discomfort is manageable  4/23/2021 1201 by Isela Ch RN  Outcome: Completed  4/23/2021 1044 by Isela Ch RN  Outcome: Ongoing  4/23/2021 0018 by Edd Granger RN  Outcome: Ongoing  Goal: Pain level will decrease  Description: Pain level will decrease  4/23/2021 1201 by Isela Ch RN  Outcome: Completed  4/23/2021 1044 by Isela Ch RN  Outcome: Ongoing  4/23/2021 0018 by Edd Granger RN  Outcome: Ongoing  Goal: Control of acute pain  Description: Control of acute pain  4/23/2021 1201 by Isela Ch RN  Outcome: Completed  4/23/2021 1044 by Isela Ch RN  Outcome: Ongoing  4/23/2021 0018 by Edd Granger RN  Outcome: Ongoing  Goal: Control of chronic pain  Description: Control of chronic pain  4/23/2021 1201 by Isela Ch RN  Outcome: Completed  4/23/2021 0018 by Edd Granger RN  Outcome: Ongoing     Problem: Skin Integrity:  Goal: Skin integrity will stabilize  Description: Skin integrity will stabilize  4/23/2021 1201 by Che Mendez RN  Outcome: Completed  4/23/2021 1044 by Che Mendez RN  Outcome: Ongoing  4/23/2021 0018 by Arnold Aguiar RN  Outcome: Ongoing     Problem: Discharge Planning:  Goal: Patients continuum of care needs are met  Description: Patients continuum of care needs are met  4/23/2021 1201 by Che Mendez RN  Outcome: Completed  4/23/2021 0018 by Arnold Aguiar RN  Outcome: Ongoing

## 2021-04-23 NOTE — PLAN OF CARE
Problem: Infection:  Goal: Will remain free from infection  Description: Will remain free from infection  4/23/2021 0018 by Earnestine Iqbal RN  Outcome: Ongoing  4/22/2021 1807 by Benito Sanabria RN  Outcome: Ongoing     Problem: Safety:  Goal: Free from accidental physical injury  Description: Free from accidental physical injury  4/23/2021 0018 by Earnestine Iqbal RN  Outcome: Ongoing  4/22/2021 1807 by Benito Sanabria RN  Outcome: Ongoing  Goal: Free from intentional harm  Description: Free from intentional harm  4/23/2021 0018 by Earnestine Iqbal RN  Outcome: Ongoing  4/22/2021 1807 by Benito Sanabria RN  Outcome: Ongoing     Problem: Daily Care:  Goal: Daily care needs are met  Description: Daily care needs are met  4/23/2021 0018 by Earnestine Iqbal RN  Outcome: Ongoing  4/22/2021 1807 by Benito Sanabria RN  Outcome: Ongoing     Problem: Pain:  Goal: Patient's pain/discomfort is manageable  Description: Patient's pain/discomfort is manageable  4/23/2021 0018 by Earnestine Iqbal RN  Outcome: Ongoing  4/22/2021 1807 by Benito Sanabria RN  Outcome: Ongoing  Goal: Pain level will decrease  Description: Pain level will decrease  4/23/2021 0018 by Earnestine Iqbal RN  Outcome: Ongoing  4/22/2021 1807 by Benito Sanabria RN  Outcome: Ongoing  Goal: Control of acute pain  Description: Control of acute pain  4/23/2021 0018 by Earnestine Iqbal RN  Outcome: Ongoing  4/22/2021 1807 by Beniot Sanabria RN  Outcome: Ongoing  Goal: Control of chronic pain  Description: Control of chronic pain  4/23/2021 0018 by Earnestine Iqbal RN  Outcome: Ongoing  4/22/2021 1807 by Benito Sanabria RN  Outcome: Ongoing     Problem: Skin Integrity:  Goal: Skin integrity will stabilize  Description: Skin integrity will stabilize  4/23/2021 0018 by Earnestine Iqbal RN  Outcome: Ongoing  4/22/2021 1807 by Benito Sanabria RN  Outcome: Ongoing     Problem: Discharge Planning:  Goal: Patients

## 2021-04-23 NOTE — PROGRESS NOTES
Handoff report and transfer of care given to [facility]. Pt in stable condition. Call light within reach. Bed locked in lowest position. Denies further needs at this time.

## 2021-04-23 NOTE — DISCHARGE SUMMARY
Hospital Medicine Discharge Summary    Patient ID: Sanjana Arias      Patient's PCP: ANICETO Ireland - CNP    Admit Date: 4/14/2021     Discharge Date: 4/20/2021     Admitting Physician: Jaimee Alvarado MD     Discharge Physician: Beata Landaverde MD     Discharge Diagnoses: Active Hospital Problems    Diagnosis    CHB (complete heart block) (HCC) [I44.2]    Sinus bradycardia [R00.1]    Coronary artery disease involving native coronary artery of native heart with unstable angina pectoris (HCC) [I25.110]    Mobitz type I Wenckebach atrioventricular block [I44.1]    Chronic combined systolic and diastolic congestive heart failure (HCC) [I50.42]       The patient was seen and examined on day of discharge and this discharge summary is in conjunction with any daily progress note from day of discharge. Hospital Course: Sanjana Arias is a 64 y.o. male who was admitted 4/8/2021 to [facility] for shortness of breath. Stress test abnormal.  He got transferred to [facility] for further evaluation and management. LHC on 4/14/2021 showed occluded SVGs. On 4/19/2021 he had impella assisted rotablator and ISREAL Diag and LM/LAD. Also noted to have intermittent Mobitz 1, monitor at discharge. Rhythm overnight has been sinus. By problem list  Severe multivessel disease: CAD   CT surgery consulted -evaluated and stated not a surgical candidate   - LHC on 4/14/2021 showed occluded SVG   -Cardiology followed and on 4/19/2021 he had impella assisted rotablator and ISREAL Diag and LM/LAD   - Continue aspirin;  Plavix and high intensity statin    Bradycardia POA: Per EP  -On Lopressor 25 mg twice daily at home -->transitioned  to metoprolol succinate and GDMT per cardiology. -Recommended 4 week monitor at time of discharge.   - Follow up as outpatient to review dizziness and monitor results.     Acute uncomplicated diverticulitis POA :  Completed 10-day course of Rocephin and Flagyl  (stop date 4/19/21)      Ischemic cardiomyopathy:  Held Lasix lisinopril for now  Metoprolol as recommended by EP  Cardiology followed and assisted with management.     OANH: Never really had a sleep study done as recommended. Class III obesity:Complicating assessment and treatment. Placing patient at risk for multiple co-morbidities as well as early death and contributing to the patient's presentation. Education, and counseling  COPD: Not in acute exacerbation, nebs    Patient evaluated by PT OT during hospital stay and was recommended for home with assist as needed. Physical Exam Performed:   /80   Pulse 78   Temp 97.9 °F (36.6 °C) (Oral)   Resp 18   Ht 5' 4\" (1.626 m)   Wt 291 lb 6.4 oz (132.2 kg)   SpO2 98%   BMI 50.02 kg/m²     General appearance: Obese  male in no apparent distress, appears stated age and cooperative. HEENT: Pupils equal, round, and reactive to light. Conjunctivae/corneas clear. Neck: Supple, with full range of motion. No jugular venous distention. Trachea midline. Respiratory:  Normal respiratory effort. Clear to auscultation, bilaterally without Rales/Wheezes/Rhonchi. Cardiovascular: Regular rate and rhythm with normal S1/S2 without murmurs, rubs or gallops. Abdomen: Soft, non-tender, non-distended with normal bowel sounds. Musculoskeletal: No clubbing, cyanosis or edema bilaterally. Full range of motion without deformity. Skin: Skin color, texture, turgor normal.  No rashes or lesions. Tattoos noted  Neurologic:  Neurovascularly intact without any focal sensory/motor deficits. Cranial nerves: II-XII intact, grossly non-focal.  Psychiatric: Alert and oriented, thought content appropriate, normal insight  Capillary Refill: Brisk,< 3 seconds   Peripheral Pulses: +2 palpable, equal bilaterally       Labs:  For convenience and continuity at follow-up the following most recent labs are provided:      CBC:    Lab Results   Component Value Date    WBC 10.0 04/22/2021    HGB 11.4 04/22/2021    HCT 34.6 04/22/2021     04/22/2021       Renal:    Lab Results   Component Value Date     04/22/2021    K 3.5 04/22/2021     04/22/2021    CO2 26 04/22/2021    BUN 8 04/22/2021    CREATININE 0.7 04/22/2021    CALCIUM 8.4 04/22/2021    PHOS 3.6 08/21/2020         Significant Diagnostic Studies    Radiology:   CTA ABDOMINAL AORTA W BILAT RUNOFF W CONTRAST   Final Result   1. Approximately 30% stenosis in the right external iliac artery. 2. Multilevel atherosclerotic disease of the SFA is bilaterally with patent   flow to the popliteal artery. 3. Limited evaluation of the distal lower extremities, likely secondary to   contrast bolus timing. 4. Near resolution of the pericolonic inflammation since the prior CT. 5. Radiopaque screw/nail seen just the left navicular bone is noted and   mostly projects within the soft tissues of the foot. Correlate with recent   prior foot surgery and prior positioning of screw, if imaging is available.                 Consults:   IP CONSULT TO CARDIOLOGY  IP CONSULT TO CARDIOTHORACIC SURGERY  IP CONSULT TO CARDIAC REHAB    Disposition: Home with assist as needed    Condition at Discharge: Stable    Discharge Instructions/Follow-up: PCP and cardiology as instructed    Code Status:  Prior     Activity: activity as tolerated    Diet: cardiac diet      Discharge Medications:   Discharge Medication List as of 4/20/2021 12:33 PM           Details   metoprolol succinate (TOPROL XL) 25 MG extended release tablet Take 1 tablet by mouth daily, Disp-30 tablet, R-11Normal              Details   furosemide (LASIX) 80 MG tablet Take 0.5 tablets by mouth daily, Disp-45 tablet, R-3Adjust Sig              Details   ipratropium (ATROVENT) 0.02 % nebulizer solution Take 2.5 mLs by nebulization 3 times daily, Disp-225 mL, R-3Normal      Handicap Placard MISC Starting Mon 3/1/2021, Disp-1 each, R-0, PrintDuration - 5 years      traZODone (DESYREL) 100 MG tablet Take 1 tablet by mouth nightly, Disp-30 tablet, R-5Normal      sertraline (ZOLOFT) 100 MG tablet Take 1 tablet by mouth daily, Disp-30 tablet, R-3Normal      clopidogrel (PLAVIX) 75 MG tablet Take 1 tablet by mouth daily, Disp-90 tablet, R-3Normal      atorvastatin (LIPITOR) 80 MG tablet Take 1 tablet by mouth nightly, Disp-90 tablet,R-3Normal      aspirin 81 MG EC tablet Take 1 tablet by mouth daily, Disp-30 tablet,R-0Print      albuterol (PROVENTIL) (2.5 MG/3ML) 0.083% nebulizer solution Take 3 mLs by nebulization every 6 hours as needed for Wheezing or Shortness of Breath, Disp-25 vial, R-1Print      albuterol sulfate HFA (PROVENTIL HFA) 108 (90 Base) MCG/ACT inhaler Inhale 2 puffs into the lungs every 6 hours as needed for Wheezing, Disp-1 Inhaler, R-0Print             Time Spent on discharge is more than 30 minutes in the examination, evaluation, counseling and review of medications and discharge plan. Signed:    Duncan Carter MD   4/20/2021      Thank you ANICETO Butt CNP for the opportunity to be involved in this patient's care. If you have any questions or concerns please feel free to contact me at [phone].

## 2021-04-23 NOTE — PROGRESS NOTES
Shift assessment complete; see flow sheet. Pt A/O x4. Scheduled medications administered; See MAR. IV infusing without difficulty. Pt denies any needs at this time. Call light within reach, bed in low locked position.

## 2021-04-23 NOTE — DISCHARGE INSTR - DIET
 Good nutrition is important when healing from an illness, injury, or surgery. Follow any nutrition recommendations given to you during your hospital stay.  If you were given an oral nutrition supplement while in the hospital, continue to take this supplement at home. You can take it with meals, in-between meals, and/or before bedtime. These supplements can be purchased at most local grocery stores, pharmacies, and chain super-stores.  If you have any questions about your diet or nutrition, call the hospital and ask for the dietitian. Fluid Restriction: Care Instructions  Your Care Instructions     A buildup of fluid in the body can cause low sodium levels in the blood. It may also cause symptoms such as swelling and pain. Your doctor may suggest that you limit liquids, including foods that contain a lot of liquid. Limiting liquids is called fluid restriction. Keeping track of the amount of fluids you take in may help you feel better. Your doctor will tell you how much fluid you can have in a day. Follow-up care is a key part of your treatment and safety. Be sure to make and go to all appointments, and call your doctor if you are having problems. It's also a good idea to know your test results and keep a list of the medicines you take. How can you care for yourself at home? · Find a way of tracking the fluids you take in that works for you. Here are two methods you can try:  ? Write down how much you drink throughout the day. ? Keep a container filled with the amount of liquid allowed for the day. As you drink liquids during the day, such as a 6-ounce cup of coffee, pour that same amount out of the container. When the container is empty, you've had your liquid for the day. · Count any foods that will melt (such as ice cream, gelatin, or flavored ice treats) or liquid foods (such as soup) as part of your fluids for the day.  Also count the liquid in canned fruits and vegetables as part of your

## 2021-04-23 NOTE — PROGRESS NOTES
Pt in bed. States he is ready to go home and is hopeful. RN observed slight redness to the upper extremity. Assessment complete. meds passed. PT expresses Discomfort to the DVT effected extremity. HOB up. Call light in reach.  Will continue to monitor

## 2021-04-25 LAB
BLOOD CULTURE, ROUTINE: NORMAL
CULTURE, BLOOD 2: NORMAL

## 2021-04-26 ENCOUNTER — TELEPHONE (OUTPATIENT)
Dept: FAMILY MEDICINE CLINIC | Age: 61
End: 2021-04-26

## 2021-04-26 PROCEDURE — 93272 ECG/REVIEW INTERPRET ONLY: CPT | Performed by: INTERNAL MEDICINE

## 2021-04-26 NOTE — TELEPHONE ENCOUNTER
----- Message from [facility] sent at 4/26/2021  8:58 AM EDT -----  Subject: Appointment Request    Reason for Call: Routine Hospital Follow Up    QUESTIONS  Type of Appointment? Established Patient  Reason for appointment request? Available appointments did not meet   patient need  Additional Information for Provider? Requesting a urgent hospital follow   up . discharged Friday , Please follow up to advise ,requesting to be seen   this week   ---------------------------------------------------------------------------  --------------  [address]  What is the best way for the office to contact you? OK to leave message on   voicemail  Preferred Call Back Phone Number? [phone]  ---------------------------------------------------------------------------  --------------  SCRIPT ANSWERS  Relationship to Patient? Self  Appointment reason? Well Care/Follow Ups  Select a Well Care/Follow Ups appointment reason? Adult Hospital Follow Up   [Inpatient Discharge, [address]  (Patient requests to see provider urgently. )? No  (Has the patient been discharged from the hospital within 2 business days   AND does not have a Telephone Encounter  Follow Up From [address] [place] [address]   documented in [address]?)? No  Do you have any questions for your primary care provider that need to be   answered prior to your appointment? (Use RN Triage if question pertains to   anything on the red flag list)? No  (Patient needs follow up visit after hospital discharge) Book first   available appointment within 7 days OF DISCHARGE, if no appt, proceed to   book the next available time slot within 14 days OF DISCHARGE AND Send   Message to Provider. [facility] Follow Up appointment cannot be booked   beyond 14 Days and should result in a Message to Provider. ? Yes   Have you been diagnosed with, tested for, or told that you are suspected   of having COVID-19 (Coronavirus)?  No  Have you had a fever or taken medication to treat a fever within the past   3 days? No  Have you had a cough, shortness of breath or flu-like symptoms within the   past 3 days? No  Do you currently have flu-like symptoms including fever or chills, cough,   shortness of breath, or difficulty breathing, or new loss of taste or   smell? No  (Service Expert  click yes below to proceed with [employer] As Usual   Scheduling)?  Yes

## 2021-04-26 NOTE — TELEPHONE ENCOUNTER
Anything I have available is a virtual visit on 4/29/2021 at 9:20 AM (please use the 920 and 940 appointment time slots for a 40-minute appointment)  Please  move the 9:20 patient that is scheduled on that day to the 9:00 am time slot

## 2021-04-28 ENCOUNTER — OFFICE VISIT (OUTPATIENT)
Dept: CARDIOLOGY CLINIC | Age: 61
End: 2021-04-28

## 2021-04-28 VITALS
HEIGHT: 64 IN | TEMPERATURE: 97.2 F | OXYGEN SATURATION: 96 % | SYSTOLIC BLOOD PRESSURE: 118 MMHG | HEART RATE: 90 BPM | WEIGHT: 280 LBS | BODY MASS INDEX: 47.8 KG/M2 | DIASTOLIC BLOOD PRESSURE: 80 MMHG

## 2021-04-28 DIAGNOSIS — I25.110 CORONARY ARTERY DISEASE INVOLVING NATIVE CORONARY ARTERY OF NATIVE HEART WITH UNSTABLE ANGINA PECTORIS (HCC): Primary | ICD-10-CM

## 2021-04-28 DIAGNOSIS — E78.2 MIXED HYPERLIPIDEMIA: ICD-10-CM

## 2021-04-28 DIAGNOSIS — I25.5 ISCHEMIC CARDIOMYOPATHY: ICD-10-CM

## 2021-04-28 DIAGNOSIS — I44.1 MOBITZ TYPE I WENCKEBACH ATRIOVENTRICULAR BLOCK: ICD-10-CM

## 2021-04-28 DIAGNOSIS — I44.2 CHB (COMPLETE HEART BLOCK) (HCC): ICD-10-CM

## 2021-04-28 DIAGNOSIS — I50.31 ACUTE DIASTOLIC CONGESTIVE HEART FAILURE (HCC): ICD-10-CM

## 2021-04-28 PROCEDURE — 99214 OFFICE O/P EST MOD 30 MIN: CPT | Performed by: INTERNAL MEDICINE

## 2021-04-28 NOTE — PATIENT INSTRUCTIONS
1. Recommend that you restart cardiac rehab to rebuild cardiac stamina  2. Nitro SL to take as needed for chest pain  3. Keep follow up with Dr. Uzma Duckworth  4. Continue current course: Aspirin, Lasix, toprol, atorvastatin, and plavix  5. We encouraged modest weight loss through implementing appropriate dietary measures as well as initiation of a graded exercise program with the ultimate goal of 150 minutes of aerobic exercise weekly.      Plan to follow up in 1 month and consider losartan at that time

## 2021-04-28 NOTE — PROGRESS NOTES
Yes Angela Orantes MD   aspirin 81 MG EC tablet Take 1 tablet by mouth daily 9/30/20  Yes ANICETO Wade CNP   albuterol (PROVENTIL) (2.5 MG/3ML) 0.083% nebulizer solution Take 3 mLs by nebulization every 6 hours as needed for Wheezing or Shortness of Breath 9/25/19  Yes ANICETO Quigley - ROXANA   albuterol sulfate HFA (PROVENTIL HFA) 108 (90 Base) MCG/ACT inhaler Inhale 2 puffs into the lungs every 6 hours as needed for Wheezing 5/17/19  Yes Joesph Plaza MD   Reports he is not taking digoxin, potassium, furosemide or statin presently. He is taking Plavix in addition aspirin. CURRENT Medications:  No current facility-administered medications for this visit. Allergies:  Dilaudid [hydromorphone hcl] and Codeine     Review of Systems:   A 14 point review of symptoms completed. Pertinent positives identified in the HPI, all other review of symptoms negative. Objective:     Vitals:    04/28/21 0918   BP: 118/80   Pulse: 90   Temp: 97.2 °F (36.2 °C)   SpO2: 96%   Weight: 280 lb (127 kg)   Height: 5' 4\" (1.626 m)          Wt Readings from Last 3 Encounters:   04/28/21 280 lb (127 kg)   04/23/21 292 lb (132.5 kg)   04/20/21 291 lb 6.4 oz (132.2 kg)       PHYSICAL EXAM:    General:  Alert, cooperative, no distress, appears stated age   Head:  Normocephalic, atraumatic   Eyes:  Conjunctiva/corneas clear, anicteric sclerae    Nose: Nares normal, no drainage or sinus tenderness   Throat: No abnormalities of the lips, oral mucosa or tongue. Moist mucous membranes. Neck: Trachea midline. Neck supple with no lymphadenopathy, thyroid not enlarged, symmetric, no tenderness/mass/nodules, no Jugular venous pressure elevation    Lungs:   Clear to auscultation bilaterally, no wheezes, no rales, no respiratory distress   Chest Wall:   Well-healed sternotomy incision. Heart:  Regular rate and rhythm, normal S1, normal S2, no murmur, no rub, no S3/S4, PMI non-palpable.    Abdomen:    Obese, soft, non-tender, with normoactive bowel sounds. No masses, no hepatosplenomegaly   Extremities: No cyanosis, clubbing. He has trace pitting of the LE bilaterally. Incision for vein harvest right lower extremity well-healed. Vascular: 2+ radial, dorsalis pedis and posterior tibial pulses bilaterally. Brisk carotid upstrokes without carotid bruit. Skin: Skin color, texture, turgor are normal with no rashes or ulceration. Pysch: Euthymic mood, appropriate affect   Neurologic: Oriented to person, place and time. No slurred speech or facial asymmetry. No motor or sensory deficits on gross examination. Labs:   CBC:   Lab Results   Component Value Date    WBC 10.0 04/22/2021    RBC 3.84 04/22/2021    HGB 11.4 04/22/2021    HCT 34.6 04/22/2021    MCV 90.1 04/22/2021    RDW 18.4 04/22/2021     04/22/2021     CMP:  Lab Results   Component Value Date     04/22/2021    K 3.5 04/22/2021     04/22/2021    CO2 26 04/22/2021    BUN 8 04/22/2021    CREATININE 0.7 04/22/2021    GFRAA >60 04/22/2021    GFRAA >60 03/16/2011    AGRATIO 1.1 04/21/2021    LABGLOM >60 04/22/2021    GLUCOSE 102 04/22/2021    PROT 5.8 04/21/2021    PROT 6.9 03/16/2011    CALCIUM 8.4 04/22/2021    BILITOT <0.2 04/21/2021    ALKPHOS 70 04/21/2021    AST 19 04/21/2021    ALT 18 04/21/2021     PT/INR:  No results found for: PTINR  HgBA1c:  Lab Results   Component Value Date    LABA1C 6.0 09/25/2020     Lab Results   Component Value Date    TROPONINI <0.01 04/21/2021     Fasting lipid profile from 9/25/2020 reviewed. HDL 34, LDL 16, triglycerides 173, total cholesterol 85.     Lab Results   Component Value Date    CHOL 85 09/25/2020    CHOL 142 09/19/2020    CHOL 152 09/11/2020     Lab Results   Component Value Date    TRIG 173 (H) 09/25/2020    TRIG 217 (H) 09/19/2020    TRIG 275 (H) 09/11/2020     Lab Results   Component Value Date    HDL 35 (L) 01/13/2021    HDL 34 (L) 09/25/2020    HDL 33 (L) 09/19/2020     Lab Results Component Value Date    LDLCALC 33 01/13/2021    LDLCALC 16 09/25/2020    LDLCALC 66 09/19/2020     Lab Results   Component Value Date    LABVLDL 26 01/13/2021    LABVLDL 35 09/25/2020    LABVLDL 43 09/19/2020     No results found for: CHOLHDLRATIO    Interval Testing/Data:   EKG 10/19/20  Normal sinus rhythm Low voltage QRS Nonspecific T wave abnormality      ECHO 3/12/21  Summary   Limited echo for left ventricle systolic function. Definity is used for   myocardial border enhancement. LV systolic function is mildly reduced with a visually estimated EF=45-50%. The left ventricle is normal in size with normal wall thickness. More prominent Inferior HK on certain views. Indeterminate diastolic function. Limited ECHO 9/29/20   Summary   Limited only f/u for LVEF post op CABG   Technically difficult examination. Normal left ventricular systolic function with ejection fraction of 55-60%. No regional wall motion abnormalites are seen. Compared to previous study from 8- no changes noted in left   ventricular function. [facility] 4/19/21   Left heart catheterization     LVEDP  5      See diagnostic catheterization report for full details, would add that there is severe tortuosity and eccentric plaque within the left main, LAD and D1 which proximal to mid was 75% and distally was 99%. CONCLUSIONS:      Successful rotational atherectomy and PCI of D1 with single drug-eluting stent  Successful rotational atherectomy and PCI of left main/LAD with single drug-eluting stent  Medical therapy for LCx , this lesion does not appear to be amenable for PCI      [facility] [place] 4/14/2021  LVGRAM     LVEDP  6   GRADIENT ACROSS AORTIC VALVE  none   LV FUNCTION EF 60%   WALL MOTION  normal   MITRAL REGURGITATION  mild         CORONARY ARTERIES     LM Proximal less than 10% stenosis, mid-distal haziness with calcification and 70% eccentric stenosis.          LAD  Proximal-mid diffuse tubular 80% stenosis, there is mid-distal bidirectional flow noted. Distal vessel is visualized on LIMA angiogram, this shows 60% distal stenosis. D1 is a large vessel with tortuosity noted and ostial/proximal 70% stenosis followed by mid vessel calcification with 50% stenosis and mid-distal 99% stenosis. LCX Medium to large size vessel, proximal 30 to 40% stenosis. OM 3 appears to be the largest OM and is 100% proximal-mid , distal vessel is seen through left to left collaterals and appears to have less than 10% stenosis. RCA Dominant, tortuous, proximal-mid 60 to 70% stenosis. There are moderate right to left collaterals to the circumflex. BYPASS GRAFTS     LIMA-LAD Widely patent with less than 10% [id number] stenosis, the distal LAD has stenosis as noted above. SVG-D1  100% proximal/mid-distal          SVG-circumflex  100% [id number]             Findings and plans as noted below were discussed with the patient and family, they understand/agree        CONCLUSIONS:      2 out of 3 bypass grafts are occluded, the LIMA to LAD graft is patent  We will consult with CT surgery regarding options for revascularization which potentially may include redo CABG versus high risk PCI of the left main into diagonal  Will order CTA abd/bilat LE w/ runoff, in case cardiac support devices are needed            Impression and Plan   Tracee Hanley is a 64 y.o. with prior history of congestive heart failure with preserved LV function with admission in August of 2020, obesity, prior 5 pack per day smoker for 15-20 years, chronic obstructive pulmonary disease on nocturnal oxygen, obstructive sleep apnea non-compliant with CPAP. He presented in September 2020 with unstable angina and was transferred to [facility] for cardiac catheterization which disclosed multivessel disease. He subsequently underwent three-vessel bypass with Dr. Jarocho Bob.  He was noted to have worsening dyspnea with Hyperlipidemia    Insomnia    Morbid obesity with BMI of 45.0-49.9, adult (HCC)    Abnormal cardiovascular stress test    Coronary artery disease involving native heart    S/P three vessel coronary artery bypass    Acute kidney injury ([address]    Acute diverticulitis    Chronic combined systolic and diastolic congestive heart failure (HCC)    Mobitz type I Wenckebach atrioventricular block    Asystole (HCC)    Ischemic cardiomyopathy    Diverticulitis of colon    Coronary artery disease involving native coronary artery of native heart with unstable angina pectoris (HCC)    CHB (complete heart block) (HCC)    Sinus bradycardia    Cellulitis    Acute deep vein thrombosis (DVT) of left upper extremity (HCC)    Cellulitis of chest wall       PLAN:  1. Continue dual anti-platelet therapy with Aspirin and plavix  2. Continue metoprolol XL. Off ACEI due to cough. Consider ARB in follow up. 3. Continue lasix at present dosing   4. Recommended restart cardiac rehab - he will consider further   5. Prescribed Nitro SL to take as needed for chest pain  6. Keep follow up with Dr. Wally Aragon; follow up monitor results   7. We encouraged modest weight loss through implementing appropriate dietary measures as well as initiation of a graded exercise program with the ultimate goal of 150 minutes of aerobic exercise weekly. He has lost 11 lbs since last evaluation. Plan to follow up in 1 month and consider losartan at that time    This note was scribed in the presence of Ami Coronel MD by Robert Rhodes RN. The scribes documentation has been prepared under my direction and personally reviewed by me in its entirety. I confirm that the note above accurately reflects all work, treatment, procedures, and medical decision making performed by me.   Rachel Lopes MD, personally performed the services described in this documentation as scribed by Robert Rhodes RN in my presence, and it is both accurate and complete to the best of our ability. I will address the patient's cardiac risk factors and adjusted pharmacologic treatment as needed. In addition, I have reinforced the need for patient directed risk factor modification. All questions and concerns were addressed to the patient/family. Alternatives to my treatment were discussed. Thank you for allowing us to participate in the care of Baylee Smith. Please call me with any questions [phone].     Yesenia Ho MD, [facility]   Cardiovascular Disease  [place] [address]  [phone] [facility]  [phone] [address] [place] [address]  4/28/2021 9:54 AM

## 2021-04-29 ENCOUNTER — HOSPITAL ENCOUNTER (OUTPATIENT)
Dept: VASCULAR LAB | Age: 61
Discharge: HOME | End: 2021-04-29
Payer: COMMERCIAL

## 2021-04-29 ENCOUNTER — VIRTUAL VISIT (OUTPATIENT)
Dept: FAMILY MEDICINE CLINIC | Age: 61
End: 2021-04-29

## 2021-04-29 DIAGNOSIS — R00.1 SINUS BRADYCARDIA: ICD-10-CM

## 2021-04-29 DIAGNOSIS — I25.110 CORONARY ARTERY DISEASE INVOLVING NATIVE CORONARY ARTERY OF NATIVE HEART WITH UNSTABLE ANGINA PECTORIS (HCC): ICD-10-CM

## 2021-04-29 DIAGNOSIS — K57.92 ACUTE DIVERTICULITIS: ICD-10-CM

## 2021-04-29 DIAGNOSIS — I82.612 SUPERFICIAL VENOUS THROMBOSIS OF ARM, LEFT: ICD-10-CM

## 2021-04-29 DIAGNOSIS — Z95.1 S/P THREE VESSEL CORONARY ARTERY BYPASS: ICD-10-CM

## 2021-04-29 DIAGNOSIS — I50.42 CHRONIC COMBINED SYSTOLIC AND DIASTOLIC CONGESTIVE HEART FAILURE (HCC): ICD-10-CM

## 2021-04-29 DIAGNOSIS — I46.9 ASYSTOLE (HCC): ICD-10-CM

## 2021-04-29 DIAGNOSIS — M79.89 PAIN AND SWELLING OF RIGHT LOWER LEG: Primary | ICD-10-CM

## 2021-04-29 DIAGNOSIS — Z09 HOSPITAL DISCHARGE FOLLOW-UP: ICD-10-CM

## 2021-04-29 DIAGNOSIS — L03.313 CELLULITIS OF CHEST WALL: ICD-10-CM

## 2021-04-29 DIAGNOSIS — M79.661 PAIN AND SWELLING OF RIGHT LOWER LEG: ICD-10-CM

## 2021-04-29 DIAGNOSIS — N17.9 ACUTE KIDNEY INJURY (HCC): ICD-10-CM

## 2021-04-29 DIAGNOSIS — M79.661 PAIN AND SWELLING OF RIGHT LOWER LEG: Primary | ICD-10-CM

## 2021-04-29 DIAGNOSIS — I44.1 MOBITZ TYPE I WENCKEBACH ATRIOVENTRICULAR BLOCK: ICD-10-CM

## 2021-04-29 DIAGNOSIS — I25.5 ISCHEMIC CARDIOMYOPATHY: ICD-10-CM

## 2021-04-29 DIAGNOSIS — M79.89 PAIN AND SWELLING OF RIGHT LOWER LEG: ICD-10-CM

## 2021-04-29 DIAGNOSIS — I44.2 CHB (COMPLETE HEART BLOCK) (HCC): ICD-10-CM

## 2021-04-29 PROCEDURE — 1111F DSCHRG MED/CURRENT MED MERGE: CPT | Performed by: NURSE PRACTITIONER

## 2021-04-29 PROCEDURE — 93925 LOWER EXTREMITY STUDY: CPT

## 2021-04-29 PROCEDURE — 99496 TRANSJ CARE MGMT HIGH F2F 7D: CPT | Performed by: NURSE PRACTITIONER

## 2021-04-29 RX ORDER — BENZONATATE 200 MG/1
200 CAPSULE ORAL 3 TIMES DAILY PRN
Qty: 30 CAPSULE | Refills: 0 | Status: SHIPPED | OUTPATIENT
Start: 2021-04-29 | End: 2021-06-21

## 2021-04-29 RX ORDER — NITROGLYCERIN 0.4 MG/1
0.4 TABLET SUBLINGUAL EVERY 5 MIN PRN
Qty: 25 TABLET | Refills: 3 | Status: SHIPPED | OUTPATIENT
Start: 2021-04-29

## 2021-04-29 NOTE — PROGRESS NOTES
2021    TELEHEALTH EVALUATION -- Audio/Visual (During [place]SD[place]-69 public health emergency)    Chief Complaint   Patient presents with    Follow-Up from Hospital       HPI:  Sanjana Arias (:  1960) has requested an audio/video evaluation for the following concern(s):    Post-Discharge Transitional Care Management Services or Hospital Follow Up      Sanjana Arias   YOB: 1960    Date of Office Visit:  2021  Date of Hospital Admission: , transferred to [facility] on 21 and then was discharged on 21 but readmitted on  for left basilic vein SVT  Date of Hospital Discharge: 21  Risk of hospital readmission (high >=14%.  Medium >=10%) :Readmission Risk Score: 20      Care management risk score Rising risk (score 2-5) and Complex Care (Scores >=6): 3     Non face to face  following discharge, date last encounter closed (first attempt may have been earlier): 2021 11:39 AM    Call initiated 2 business days of discharge: No    Patient Active Problem List   Diagnosis    Crush injury of right foot    Shortness of breath    Chest pain    Erectile dysfunction    Hyperglycemia    Anxiety    Depression    Tobacco abuse    History of alcohol abuse    Fatigue    OANH (obstructive sleep apnea)    Hypersomnia    Chronic obstructive pulmonary disease (HCC)    Lumbar radiculopathy    HNP (herniated nucleus pulposus), lumbar    Spondylosis without myelopathy or radiculopathy, lumbar region    DDD (degenerative disc disease), lumbar    Lumbar spine pain    Acute respiratory distress    Class 3 severe obesity with body mass index (BMI) of 45.0 to 49.9 in adult ([address]    Pneumonia, primary atypical    Acute respiratory failure with hypoxia ([address]    Moderate protein-calorie malnutrition (HCC)    Acute respiratory failure ([address]    Acute on chronic respiratory failure with hypoxemia (HCC)    Acute diastolic congestive heart failure ([address]    Hyperlipidemia    Insomnia    Morbid obesity with BMI of 45.0-49.9, adult (HCC)    Abnormal cardiovascular stress test    Coronary artery disease involving native heart    S/P three vessel coronary artery bypass    Acute kidney [facility]    Acute diverticulitis    Chronic combined systolic and diastolic congestive heart failure (HCC)    Mobitz type I Wenckebach atrioventricular block    Asystole (HCC)    Ischemic cardiomyopathy    Diverticulitis of colon    Coronary artery disease involving native coronary artery of native heart with unstable angina pectoris (HCC)    CHB (complete heart block) (HCC)    Sinus bradycardia    Cellulitis    Acute deep vein thrombosis (DVT) of left upper extremity (HCC)    Cellulitis of chest wall       Allergies   Allergen Reactions    Dilaudid [Hydromorphone Hcl] Nausea And Vomiting    Codeine Itching       Medications listed as ordered at the time of discharge from [facility] Medication Instructions [facility]:    Printed on:04/29/21 4675   Medication Information                      albuterol (PROVENTIL) (2.5 MG/3ML) 0.083% nebulizer solution  Take 3 mLs by nebulization every 6 hours as needed for Wheezing or Shortness of Breath             albuterol sulfate HFA (PROVENTIL HFA) 108 (90 Base) MCG/ACT inhaler  Inhale 2 puffs into the lungs every 6 hours as needed for Wheezing             aspirin 81 MG EC tablet  Take 1 tablet by mouth daily             atorvastatin (LIPITOR) 80 MG tablet  Take 1 tablet by mouth nightly             clindamycin (CLEOCIN) 300 MG capsule  Take 1 capsule by mouth 4 times daily for 6 days             clopidogrel (PLAVIX) 75 MG tablet  Take 1 tablet by mouth daily             furosemide (LASIX) 80 MG tablet  Take 0.5 tablets by mouth daily             ipratropium (ATROVENT) 0.02 % nebulizer solution  Take 2.5 mLs by nebulization 3 times daily             lactobacillus (CULTURELLE) capsule  Take 1 capsule by mouth 2 times daily for 6 days             metoprolol succinate (TOPROL XL) 25 MG extended release tablet  Take 1 tablet by mouth daily             sertraline (ZOLOFT) 100 MG tablet  Take 1 tablet by mouth daily             traZODone (DESYREL) 100 MG tablet  Take 1 tablet by mouth nightly                   Medications marked \"taking\" at this time  Outpatient Medications Marked as Taking for the 4/29/21 encounter (Virtual Visit) with Kelsey Client, ANICETO - CNP   Medication Sig Dispense Refill    clindamycin (CLEOCIN) 300 MG capsule Take 1 capsule by mouth 4 times daily for 6 days 24 capsule 0    lactobacillus (CULTURELLE) capsule Take 1 capsule by mouth 2 times daily for 6 days 12 capsule 0    furosemide (LASIX) 80 MG tablet Take 0.5 tablets by mouth daily 45 tablet 3    metoprolol succinate (TOPROL XL) 25 MG extended release tablet Take 1 tablet by mouth daily 30 tablet 11    ipratropium (ATROVENT) 0.02 % nebulizer solution Take 2.5 mLs by nebulization 3 times daily 225 mL 3    traZODone (DESYREL) 100 MG tablet Take 1 tablet by mouth nightly (Patient taking differently: Take 100 mg by mouth 2 times daily Am pm) 30 tablet 5    sertraline (ZOLOFT) 100 MG tablet Take 1 tablet by mouth daily 30 tablet 3    clopidogrel (PLAVIX) 75 MG tablet Take 1 tablet by mouth daily 90 tablet 3    atorvastatin (LIPITOR) 80 MG tablet Take 1 tablet by mouth nightly 90 tablet 3    aspirin 81 MG EC tablet Take 1 tablet by mouth daily 30 tablet 0    albuterol (PROVENTIL) (2.5 MG/3ML) 0.083% nebulizer solution Take 3 mLs by nebulization every 6 hours as needed for Wheezing or Shortness of Breath 25 vial 1    albuterol sulfate HFA (PROVENTIL HFA) 108 (90 Base) MCG/ACT inhaler Inhale 2 puffs into the lungs every 6 hours as needed for Wheezing 1 Inhaler 0        Medications patient taking as of now reconciled against medications ordered at time of hospital discharge: Yes    Chief Complaint   Patient presents with    Follow-Up from Hospital       History of Present illness - Follow up of Hospital diagnosis(es):   1.    2. Coronary artery disease involving native coronary artery of native heart with unstable angina pectoris ([address]    3. Chronic combined systolic and diastolic congestive heart failure ([address]    4. Acute kidney injury ([address]    5. Asystole ([address]    6. CHB (complete heart block) (HCC)    7. Cellulitis of chest wall    8. Superficial venous thrombosis of arm, left    9. Mobitz type I Wenckebach atrioventricular block    10. Acute diverticulitis    11. Ischemic cardiomyopathy    12. Sinus bradycardia    13. S/P three vessel coronary artery bypass          Inpatient course: Discharge summary reviewed- see chart. Patient admitted on 4/8/21 for acute diverticulitis and treated with IV antibiotics. While he was there he developed chest pain and found to have abnormal stress test.   - Episode of asystole and Mobitz type I heart block on 4/11/20210   Patient needed EP eval and cardia cath at [facility]    He was then transferred to [facility] on 4/14/21  Patient had Left heart cath on 4/14/21 showed occluded SVGs. On 4/19/2021 he had impella assisted rotablator and ISREAL Diag and left main/LAD  Discharged home on 4/20/21  Readmitted on 4/21/21 - 4/23/21 for cellulitis left chest wall and left basilic vein SVT (from PICC)    Interval history/Current status: Patient states he is feeling better. He continues with cough despite lisinopril being discontinued. He is not currently on ACEI or ARB but cardio to consider starting losartan in near future. He states his BP is 120's/70-80's at home since discharge. His pulse has been in the 90's,  He is to have a heart monitor for 4 weeks. His lasix has been reduced to half. Patient denies any exertional chest pain, worsening dyspnea, palpitations, syncope, orthopnea, edema or paroxysmal nocturnal dyspnea. He does c/o of a \"knot\" that is tender along the outer aspect of his right thigh.   There is no erythema or unusual warmth but there is tenderness. He recently had superficial venous thrombosis in LUE basilic vein from PICC line. He reports the swelling and tenderness continues to improve. A comprehensive review of systems was negative except for what was noted in the HPI. PHYSICAL EXAMINATION:  [ INSTRUCTIONS:  \"[x]\" Indicates a positive item  \"[]\" Indicates a negative item  -- DELETE ALL ITEMS NOT EXAMINED]  Vital Signs: (As obtained by patient/caregiver or practitioner observation)    Blood pressure-  Heart rate-    Respiratory rate-    Temperature-  Pulse oximetry-     Constitutional: [x] Appears well-developed and well-nourished [x] No apparent distress      [] Abnormal-   Mental status  [x] Alert and awake  [x] Oriented to person/place/time [x]Able to follow commands      Eyes:  EOM    [x]  Normal  [] Abnormal-  Sclera  [x]  Normal  [] Abnormal -         Discharge [x]  None visible  [] Abnormal -    HENT:   [x] Normocephalic, atraumatic. [] Abnormal   [x] Mouth/Throat: Mucous membranes are moist.     External Ears [x] Normal  [] Abnormal-     Neck: [x] No visualized mass     Pulmonary/Chest: [x] Respiratory effort normal.  [x] No visualized signs of difficulty breathing or respiratory distress        [] Abnormal-      Musculoskeletal:   [x] Normal gait with no signs of ataxia         [x] Normal range of motion of neck        [] Abnormal-       Neurological:        [x] No Facial Asymmetry (Cranial nerve 7 motor function) (limited exam to video visit)          [x] No gaze palsy        [] Abnormal-         Skin:        [x] No significant exanthematous lesions or discoloration noted on facial skin         [] Abnormal-            Psychiatric:       [x] Normal Affect [x] No Hallucinations        [] Abnormal-     Other pertinent observable physical exam findings-     There were no vitals filed for this visit. There is no height or weight on file to calculate BMI.    Wt Readings from Last 3 Encounters: 04/28/21 280 lb (127 kg)   04/23/21 292 lb (132.5 kg)   04/20/21 291 lb 6.4 oz (132.2 kg)     BP Readings from Last 3 Encounters:   04/28/21 118/80   04/23/21 (!) 141/86   04/20/21 129/80            Assessment/Plan:  1. Pain and swelling of right  leg  Lateral aspect mid right thigh  - VL Extremity Venous Right; Future--STAT  If Positive will order CTA due to dyspnea and cough (even though these have been an ongoing issue)    2. Coronary artery disease involving native coronary artery of native heart with unstable angina pectoris (HCC)  Recent [facility] with significant multivessel obstructive disease and was not a surgical candidate  S/p PCI to left main/LAD with ISREAL on 4/19/21  Continue asa, plavix, statin and BB  - [place] DISCHARGE MEDS RECONCILED W/ CURRENT OUTPATIENT MED LIST    3. Chronic combined systolic and diastolic congestive heart failure (HCC)  Patient's lasix has reduced by half and is doing well  Lisinopril discontinued due to cough and DENIA  Per cardiology note will consider initiation of losartan in near future. - [place] DISCHARGE MEDS RECONCILED W/ CURRENT OUTPATIENT MED LIST    4. Acute kidney injury ([address]  Most likely due to increase in lasix to 80 mg bid on 3/3/21  DENIA resolved upon discharge  Continue lasix 40 mg daily  - [place] DISCHARGE MEDS RECONCILED W/ CURRENT OUTPATIENT MED LIST    5. Asystole [facility])  Patient will need referral to EP cardiology  Heart monitor x 4 weeks  - [place] DISCHARGE MEDS RECONCILED W/ CURRENT OUTPATIENT MED LIST    6. CHB (complete heart block) ([facility])  - [place] DISCHARGE MEDS RECONCILED W/ CURRENT OUTPATIENT MED LIST    7. Cellulitis of chest wall  Discharged on 4/23/21 with clindamycin 300 mg QID x 4 days  Patient completed atb and symptoms have almost resolved  - [place] DISCHARGE MEDS RECONCILED W/ CURRENT OUTPATIENT MED LIST    8.  Superficial venous thrombosis of arm, left  PICC line removed  Already on asa and plavix  Symptoms resolving  - [place] DISCHARGE MEDS RECONCILED W/ CURRENT OUTPATIENT MED LIST    9. Mobitz type I Wenckebach atrioventricular block  Patient will need referral to EP cardiology  Heart monitor x 4 weeks  - [place] DISCHARGE MEDS RECONCILED W/ CURRENT OUTPATIENT MED LIST    10. Acute diverticulitis  Completed 10 day course of rocephin and flagyl on 4/19/21  Denies any side effects of medication and denies further symptoms  - [place] DISCHARGE MEDS RECONCILED W/ CURRENT OUTPATIENT MED LIST    11. Ischemic cardiomyopathy  Patient was on lopressor 25 mg bid and transitioned to metoprolol succinate per cardiology  - [place] DISCHARGE MEDS RECONCILED W/ CURRENT OUTPATIENT MED LIST    12. Sinus bradycardia  Patient was on lopressor 25 mg bid and transitioned to metoprolol succinate per cardiology  4 week monitor at home  - [address] MED LIST    13. S/P three vessel coronary artery bypass  - [place] DISCHARGE MEDS RECONCILED W/ CURRENT OUTPATIENT MED LIST    14. Hospital discharge follow-up  Extensively reviewed and discussed recent in patient hospital records, labs, imaging and consults with patient, discussed HPI and Plan, coordinated care and patient counseling provided at today's visit    - [place] DISCHARGE MEDS RECONCILED W/ CURRENT OUTPATIENT MED LIST        Medical Decision Making: high complexity      Manpreet Alonzo is a 64 y.o. male being evaluated by a Virtual Visit (video visit) encounter to address concerns as mentioned above. A caregiver was present when appropriate. Due to this being a TeleHealth encounter (During [facility] public health emergency), evaluation of the following organ systems was limited: Vitals/Constitutional/EENT/Resp/CV/GI//MS/Neuro/Skin/Heme-Lymph-Imm.   Pursuant to the emergency declaration under the [facility], [facility]5 waiver authority and the [employer] Act, this Virtual Visit was conducted with patient's (and/or legal guardian's) consent, to reduce the patient's risk of exposure to COVID-19 and provide necessary medical care. The patient (and/or legal guardian) has also been advised to contact this office for worsening conditions or problems, and seek emergency medical treatment and/or call 911 if deemed necessary. Patient identification was verified at the start of the visit: Yes    Total time spent on this encounter: 50 minutes    Services were provided through a video synchronous discussion virtually to substitute for in-person clinic visit. Patient and provider were located at their individual homes. --ANICETO Butt CNP on 4/29/2021 at 9:31 AM    An electronic signature was used to authenticate this note.

## 2021-04-29 NOTE — PROGRESS NOTES
Arleth Abernathy is a 64 y.o. male evaluated via telephone on 4/29/2021.       Consent:  He and/or health care decision maker is aware that that he may receive a bill for this telephone service, depending on his insurance coverage, and has provided verbal consent to proceed: Dontae Gasca

## 2021-04-30 NOTE — CARE COORDINATION
Readmission Assessment  Number of Days since last admission?: 1-7 days(PRIOR ADMIT AT [facility] --DISCHARGE ONE DAY PRIOR TO PRESENT ADMIT)  Previous Disposition: Home with Family  Who is being Interviewed: (CHART REVIEW)  What was the patient's/caregiver's perception as to why they think they needed to return back to the hospital?: Other (Comment)(WORSEN AND NEW ISSUE)  Did you visit your Primary Care Physician after you left the hospital, before you returned this time?: No  Why weren't you able to visit your PCP?: Other (Comment)(LEFT [facility] ONE DAY PRIOR)  Did you see a specialist, such as Cardiac, Pulmonary, Orthopedic Physician, etc. after you left the hospital?: No  Who advised the patient to return to the hospital?: Self-referral  Does the patient report anything that got in the way of taking their medications?: No

## 2021-05-05 ENCOUNTER — TELEPHONE (OUTPATIENT)
Dept: ORTHOPEDIC SURGERY | Age: 61
End: 2021-05-05

## 2021-05-05 ENCOUNTER — HOSPITAL ENCOUNTER (OUTPATIENT)
Dept: GENERAL RADIOLOGY | Age: 61
Discharge: HOME | End: 2021-05-05
Payer: COMMERCIAL

## 2021-05-05 ENCOUNTER — OFFICE VISIT (OUTPATIENT)
Dept: FAMILY MEDICINE CLINIC | Age: 61
End: 2021-05-05

## 2021-05-05 ENCOUNTER — HOSPITAL ENCOUNTER (OUTPATIENT)
Age: 61
Discharge: HOME | End: 2021-05-05
Payer: COMMERCIAL

## 2021-05-05 VITALS
DIASTOLIC BLOOD PRESSURE: 74 MMHG | BODY MASS INDEX: 47.97 KG/M2 | HEIGHT: 64 IN | HEART RATE: 86 BPM | OXYGEN SATURATION: 97 % | WEIGHT: 281 LBS | SYSTOLIC BLOOD PRESSURE: 110 MMHG

## 2021-05-05 DIAGNOSIS — M79.89 PAIN AND SWELLING OF RIGHT LOWER LEG: ICD-10-CM

## 2021-05-05 DIAGNOSIS — R42 DIZZINESS: ICD-10-CM

## 2021-05-05 DIAGNOSIS — T85.848A PAIN FROM IMPLANTED HARDWARE, INITIAL ENCOUNTER: ICD-10-CM

## 2021-05-05 DIAGNOSIS — M79.672 LEFT FOOT PAIN: Primary | ICD-10-CM

## 2021-05-05 DIAGNOSIS — T85.848A PAIN FROM IMPLANTED HARDWARE, INITIAL ENCOUNTER: Primary | ICD-10-CM

## 2021-05-05 DIAGNOSIS — R05.9 COUGH: ICD-10-CM

## 2021-05-05 DIAGNOSIS — M79.661 PAIN AND SWELLING OF RIGHT LOWER LEG: ICD-10-CM

## 2021-05-05 PROCEDURE — 73630 X-RAY EXAM OF FOOT: CPT

## 2021-05-05 PROCEDURE — 99214 OFFICE O/P EST MOD 30 MIN: CPT | Performed by: NURSE PRACTITIONER

## 2021-05-05 RX ORDER — MECLIZINE HYDROCHLORIDE 25 MG/1
25 TABLET ORAL 3 TIMES DAILY PRN
Qty: 30 TABLET | Refills: 0 | Status: ON HOLD | OUTPATIENT
Start: 2021-05-05 | End: 2021-12-22

## 2021-05-05 NOTE — TELEPHONE ENCOUNTER
General Question     Subject: APPOINTMENT SOONER  Patient and /or Facility Request: Kathleen Goes Number: [phone]  PATIENT HAS SCREWS TRYING TO COME OUT OF HIS ANKLE WHICH HE HAS HAD IN FOR 30 SOME YEARS. HE WAS REF. BY HIS PRIMARY DOCTOR.  I MADE AN APPOINTMENT FOR HIM FOR 06/08

## 2021-05-05 NOTE — PROGRESS NOTES
[address]  [address] [place] [address] [address] [address]  Dept: [phone]  Dept Fax: [phone]  Loc: [phone]    Galina Matt is a 64 y.o. male who presents today for his medical conditions/complaints as noted below. Galina Matt is c/o of Leg Swelling (doppler done on 4/29)       Subjective:     Chief Complaint   Patient presents with    Leg Swelling     doppler done on 4/29       HPI  The patient is being seen today for follow-up from virtual office visit of 4/29/2021 where he was seen for a hospital follow-up but was complaining of right outer thigh tenderness, swelling and a lump. The patient did have a venous Doppler of the right lower extremity which was negative for any DVT or SVT  The patient also was complaining of a cough that was persistent. He was given Nataliya Likes and states that he is doing well with that medication and the cough has essentially resolved  The patient does complain of dizziness. However this is not a new issue and was evaluated during his hospitalization. Overall he states that his dizziness is improving. The patient's main complaint today is left medial midfoot pain which started several years ago. He states he had surgery on his left foot about 35 years ago. He states that the pain is sharp and throbbing. The patient states that the pain is over the outer aspect where he had a hardware from his previous surgery.   He states that the hardware is \"poking out\" the patient states that walking and wearing a tight shoe aggravates the issue    Overall the patient states he has not felt this good in several months    Past Medical History:   Diagnosis Date    Acute diverticulitis 04/10/2021    Acute kidney injury ([address] 04/08/2021    Acute on chronic respiratory failure with hypoxemia ([address]     Acute respiratory failure ([address] 08/19/2020    Acute respiratory failure with hypoxia ([address]     Anxiety 01/06/2020    Aortic and nausea. Endocrine: Negative. Genitourinary: Negative. Negative for hematuria. Musculoskeletal: Positive for arthralgias and gait problem. Skin: Negative. Negative for rash. Allergic/Immunologic: Negative. Neurological: Positive for dizziness. Negative for syncope, light-headedness and numbness. Hematological: Negative. Does not bruise/bleed easily. Psychiatric/Behavioral: Negative. All other systems reviewed and are negative.        Past Medical History:   Diagnosis Date    Acute diverticulitis 04/10/2021    Acute kidney injury ([address] 04/08/2021    Acute on chronic respiratory failure with hypoxemia (HCC)     Acute respiratory failure ([address] 08/19/2020    Acute respiratory failure with hypoxia ([address]     Anxiety 01/06/2020    Aortic valve calcification 09/2020    seen on lung CT    CAD in native artery 04/14/2021    Chronic obstructive pulmonary disease ([facility] 09/03/2019    PFT done 9/03/19    Chronic systolic congestive heart failure ([address] 04/10/2021    Class 3 severe obesity with body mass index (BMI) of 45.0 to 49.9 in adult [facility])     Coronary artery calcification 09/2020    seen on lung ct    Coronary artery disease involving native heart with angina pectoris ([facility] 09/22/2020    Crush injury of right foot 07/23/2015    DDD (degenerative disc disease), lumbar 01/06/2020    Depression 01/06/2020    Diverticulitis of colon 04/10/2021    Erectile dysfunction 01/06/2020    Fatigue 01/06/2020    History of alcohol abuse 01/06/2020    History of drug use     HNP (herniated nucleus pulposus), lumbar 01/06/2020    Hyperglycemia 01/06/2020    Hyperlipidemia     Hypersomnia 01/06/2020    Insomnia     Ischemic cardiomyopathy     Kidney stone     Lumbar radiculopathy 01/06/2020    Lumbar spine pain 01/06/2020    LVH (left ventricular hypertrophy)     seen on echo 8/2020, moderate    Mobitz type I Wenckebach atrioventricular block 04/10/2021    Moderate protein-calorie malnutrition ([facility] 03/17/2020    Observed sleep apnea 01/06/2020    OANH (obstructive sleep apnea) 01/06/2020    Pneumonia, primary atypical     Reactive depression 01/06/2020    S/P three vessel coronary artery bypass 10/26/2020    Spondylosis without myelopathy or radiculopathy, lumbar region 01/06/2020    Tobacco abuse 01/06/2020    Tobacco use 01/06/2020     Family History   Problem Relation Age of Onset    Heart Disease Mother     Other Mother         copd    Liver Disease Father     High Blood Pressure Sister     No Known Problems Sister      Past Surgical History:   Procedure Laterality Date    CHOLECYSTECTOMY      CORONARY ARTERY BYPASS GRAFT N/A 9/25/2020    CORONARY ARTERY BYPASS GRAFTING X3, INTERNAL MAMMARY ARTERY, SAPHENOUS VEIN GRAFT, ON PUMP, STERNAL PLATING, 5 LEVEL BILATERAL INTERCOSTAL NERVE BLOCK, PLATELET GEL APPLICATION performed by Maryann Sethi MD at [address] [facility]  03/16/2011    cystoscopy left ureteroscopy, left retrograde, double J stent placement    FINGER AMPUTATION Right 2/2/2021    RIGHT MIDDLE FINGER IRRIGATION AND DEBRIDEMENT WITH REVISION AMPUTATION AND BONE SHORTENING, POSSIBLE NAIL ABLATION performed by Calos Bradley MD at [address] [facility] Right 12/24/2019    RIGHT LUMBAR FIVE SACRAL ONE TRANSFORAMINAL EPIDURAL STEROID INJECTION SITE CONFIRMED BY FLUOROSCOPY performed by Toni Morley MD at [address] [facility] Right 1/7/2020    RIGHT LUMBAR FOUR AND LUMBAR FIVE TRANSFORAMINAL EPIDURAL STEROID INJECTION SITE CONFIRMED BY FLUOROSCOPY performed by Toni Morley MD at [address] History     Socioeconomic History    Marital status:      Spouse name: Not on file    Number of children: Not on file    Years of education: Not on file    Highest education level: Not on file   Occupational History    Not on file   Social Needs    Financial resource strain: Not on file   [place] insecurity     Worry: Not on file     Inability: Not on file    Transportation needs     Medical: Not on file     Non-medical: Not on file   Tobacco Use    Smoking status: Former Smoker     Packs/day: 2.00     Years: 35.00     Pack years: 70.00     Types: Cigarettes     Quit date: 3/1/2020     Years since quittin.1    Smokeless tobacco: Never Used   Substance and Sexual Activity    Alcohol use: No    Drug use: Not Currently     Comment: hx of drug use    Sexual activity: Not on file   Lifestyle    Physical activity     Days per week: Not on file     Minutes per session: Not on file    Stress: Not on file   Relationships    Social connections     Talks on phone: Not on file     Gets together: Not on file     Attends Jew service: Not on file     Active member of club or organization: Not on file     Attends meetings of clubs or organizations: Not on file     Relationship status: Not on file    Intimate partner violence     Fear of current or ex partner: Not on file     Emotionally abused: Not on file     Physically abused: Not on file     Forced sexual activity: Not on file   Other Topics Concern    Not on file   Social History Narrative    Not on file     Current Outpatient Medications   Medication Sig Dispense Refill    meclizine (ANTIVERT) 25 MG tablet Take 1 tablet by mouth 3 times daily as needed for Dizziness 30 tablet 0    nitroGLYCERIN (NITROSTAT) 0.4 MG SL tablet Place 1 tablet under the tongue every 5 minutes as needed for Chest pain 25 tablet 3    benzonatate (TESSALON) 200 MG capsule Take 1 capsule by mouth 3 times daily as needed for Cough 30 capsule 0    furosemide (LASIX) 80 MG tablet Take 0.5 tablets by mouth daily 45 tablet 3    metoprolol succinate (TOPROL XL) 25 MG extended release tablet Take 1 tablet by mouth daily 30 tablet 11    ipratropium (ATROVENT) 0.02 % nebulizer solution Take 2.5 mLs by nebulization 3 times daily 225 mL 3    traZODone (DESYREL) 100 MG tablet Take 1 tablet by mouth nightly (Patient taking differently: Take 100 mg by mouth 2 times daily Am pm) 30 tablet 5    sertraline (ZOLOFT) 100 MG tablet Take 1 tablet by mouth daily 30 tablet 3    clopidogrel (PLAVIX) 75 MG tablet Take 1 tablet by mouth daily 90 tablet 3    atorvastatin (LIPITOR) 80 MG tablet Take 1 tablet by mouth nightly 90 tablet 3    aspirin 81 MG EC tablet Take 1 tablet by mouth daily 30 tablet 0    albuterol (PROVENTIL) (2.5 MG/3ML) 0.083% nebulizer solution Take 3 mLs by nebulization every 6 hours as needed for Wheezing or Shortness of Breath 25 vial 1    albuterol sulfate HFA (PROVENTIL HFA) 108 (90 Base) MCG/ACT inhaler Inhale 2 puffs into the lungs every 6 hours as needed for Wheezing 1 Inhaler 0     No current facility-administered medications for this visit. No changes in past medical history, past surgical history, social history, orfamily history were noted during the patient encounter unless specifically listed above. All updates of past medical history, past surgical history, social history, or family history were reviewed personally by me duringthe office visit. All problems listed in the assessment are stable unless noted otherwise. Medication profile reviewed personally by me during the office visit. Medication side effects and possible impairments frommedications were discussed as applicable. Objective:     Physical Exam  Vitals and nursing note reviewed. Constitutional:       General: He is not in acute distress. Appearance: Normal appearance. He is well-developed. HENT:      Head: Normocephalic and atraumatic. Right Ear: Tympanic membrane, ear canal and external ear normal.      Left Ear: Tympanic membrane, ear canal and external ear normal.      Nose: Nose normal.   Eyes:      General: Lids are normal.      Conjunctiva/sclera: Conjunctivae normal.      Pupils: Pupils are equal, round, and reactive to light. Neck:      Thyroid: No thyromegaly. 04/21/2021 4.28     Hemoglobin 04/21/2021 12.6*    Hematocrit 04/21/2021 38.3*    MCV 04/21/2021 89.6     MCH 04/21/2021 29.4     MCHC 04/21/2021 32.8     RDW 04/21/2021 18.2*    Platelets 12/32/6669 175     MPV 04/21/2021 8.2     Neutrophils % 04/21/2021 74.5     Lymphocytes % 04/21/2021 14.1     Monocytes % 04/21/2021 7.2     Eosinophils % 04/21/2021 2.4     Basophils % 04/21/2021 1.8     Neutrophils Absolute 04/21/2021 8.5*    Lymphocytes Absolute 04/21/2021 1.6     Monocytes Absolute 04/21/2021 0.8     Eosinophils Absolute 04/21/2021 0.3     Basophils Absolute 04/21/2021 0.2     Sodium 04/21/2021 138     Potassium reflex Magnesi* 04/21/2021 3.6     Chloride 04/21/2021 103     CO2 04/21/2021 28     Anion Gap 04/21/2021 7     Glucose 04/21/2021 108*    BUN 04/21/2021 13     CREATININE 04/21/2021 0.8*    GFR Non- 04/21/2021 >60     GFR  04/21/2021 >60     Calcium 04/21/2021 8.6     Total Protein 04/21/2021 5.8*    Albumin 04/21/2021 3.1*    Albumin/Globulin Ratio 04/21/2021 1.1     Total Bilirubin 04/21/2021 <0.2     Alkaline Phosphatase 04/21/2021 70     ALT 04/21/2021 18     AST 04/21/2021 19     Globulin 04/21/2021 2.7     Troponin 04/21/2021 <0.01     Pro-BNP 04/21/2021 522*    D-Dimer, Quant 04/21/2021 505*    Ventricular Rate 04/21/2021 98     Atrial Rate 04/21/2021 98     P-R Interval 04/21/2021 168     QRS Duration 04/21/2021 74     Q-T Interval 04/21/2021 350     QTc Calculation (Bazett) 04/21/2021 446     P Axis 04/21/2021 -15     R Axis 04/21/2021 -46     T Axis 04/21/2021 46     Diagnosis 04/21/2021 Normal sinus rhythmLeft axis deviationLow voltage QRSInferior infarct , age undeterminedPoor R wave progressionNonspecific ST abnormalityAbnormal ECGWhen compared with ECG of 21-APR-2021 07:35, (unconfirmed)HR increasedConfirmed by DODIE Fox MD ([id number]) on 4/21/2021 12:24:03 PM     Lactic Acid, Sepsis 04/21/2021 1.2     Blood Culture, Routine 04/21/2021 No Growth after 4 days of incubation.  Culture, Blood 2 04/21/2021 No Growth after 4 days of incubation.      SARS-CoV-2, NAAT 04/21/2021 Not Detected     Sodium 04/22/2021 138     Potassium reflex Magnesi* 04/22/2021 3.5     Chloride 04/22/2021 105     CO2 04/22/2021 26     Anion Gap 04/22/2021 7     Glucose 04/22/2021 102*    BUN 04/22/2021 8     CREATININE 04/22/2021 0.7*    GFR Non- 04/22/2021 >60     GFR  04/22/2021 >60     Calcium 04/22/2021 8.4     WBC 04/22/2021 10.0     RBC 04/22/2021 3.84*    Hemoglobin 04/22/2021 11.4*    Hematocrit 04/22/2021 34.6*    MCV 04/22/2021 90.1     MCH 04/22/2021 29.7     MCHC 04/22/2021 33.0     RDW 04/22/2021 18.4*    Platelets 26/57/9532 156     MPV 04/22/2021 8.4     Magnesium 04/22/2021 1.80     Ventricular Rate 04/22/2021 102     Atrial Rate 04/22/2021 102     P-R Interval 04/22/2021 180     QRS Duration 04/22/2021 74     Q-T Interval 04/22/2021 336     QTc Calculation (Bazett) 04/22/2021 437     P Axis 04/22/2021 50     R Axis 04/22/2021 -77     T Axis 04/22/2021 54     Diagnosis 04/22/2021 Sinus tachycardiaLeft axis deviationLow voltageInferior infarct (cited on or before 19-APR-2021)Nonspecific ST abnormalityWhen compared with ECG of 21-APR-2021 08:17,No significant change was foundConfirmed by Matthew Degroot MD, Cas Quezada ([id number]) on 4/22/2021 8:56:32 AM    Admission on 04/14/2021, Discharged on 04/20/2021   Component Date Value    Left Ventricular Ejectio* 04/14/2021 60     LVEF MODALITY 04/14/2021 CATH     WBC 04/15/2021 11.6*    RBC 04/15/2021 4.43     Hemoglobin 04/15/2021 12.8*    Hematocrit 04/15/2021 38.9*    MCV 04/15/2021 87.8     MCH 04/15/2021 29.0     MCHC 04/15/2021 33.0     RDW 04/15/2021 18.0*    Platelets 64/79/3804 173     MPV 04/15/2021 7.9     Sodium 04/15/2021 139     Potassium 04/15/2021 4.4     Chloride 04/15/2021 102     CO2 04/15/2021 30  Anion Gap 04/15/2021 7     Glucose 04/15/2021 109*    BUN 04/15/2021 13     CREATININE 04/15/2021 0.9     GFR Non- 04/15/2021 >60     GFR  04/15/2021 >60     Calcium 04/15/2021 9.2     Ventricular Rate 04/15/2021 72     Atrial Rate 04/15/2021 72     P-R Interval 04/15/2021 192     QRS Duration 04/15/2021 78     Q-T Interval 04/15/2021 362     QTc Calculation (Bazett) 04/15/2021 396     P Axis 04/15/2021 70     R Axis 04/15/2021 17     T Axis 04/15/2021 79     Diagnosis 04/15/2021 Normal sinus rhythmLow voltage in the limb leadsNonspecific T wave abnormalityBorderline ECGWhen compared with ECG of 08-APR-2021 18:15,No significant change was foundConfirmed by Marybeth Marmolejo MD, Virgfredi Ansari ([id number]) on 4/15/2021 7:18:19 PM     Troponin 04/15/2021 <0.01     WBC 04/15/2021 11.2*    RBC 04/15/2021 4.30     Hemoglobin 04/15/2021 12.2*    Hematocrit 04/15/2021 38.1*    MCV 04/15/2021 88.5     MCH 04/15/2021 28.4     MCHC 04/15/2021 32.1     RDW 04/15/2021 18.0*    Platelets 09/45/7325 187     MPV 04/15/2021 8.4     aPTT 04/15/2021 30.7     aPTT 04/15/2021 33.2     aPTT 04/16/2021 239.8*    aPTT 04/16/2021 48.1*    POC ACT LR 04/14/2021 154     POC ACT LR 04/14/2021 209     POC ACT LR 04/14/2021 242     Rejected Test 04/16/2021 ptt     Reason for Rejection 04/16/2021 see below     aPTT 04/16/2021 49.3*    aPTT 04/16/2021 46.3*    Sodium 04/17/2021 140     Potassium reflex Magnesi* 04/17/2021 4.1     Chloride 04/17/2021 104     CO2 04/17/2021 28     Anion Gap 04/17/2021 8     Glucose 04/17/2021 113*    BUN 04/17/2021 9     CREATININE 04/17/2021 0.9     GFR Non- 04/17/2021 >60     GFR  04/17/2021 >60     Calcium 04/17/2021 9.0     WBC 04/17/2021 10.2     RBC 04/17/2021 4.15*    Hemoglobin 04/17/2021 12.2*    Hematocrit 04/17/2021 36.7*    MCV 04/17/2021 88.4     MCH 04/17/2021 29.4     MCHC 04/17/2021 33.3     RDW 04/17/2021 18.1*    Platelets 48/66/5305 180     MPV 04/17/2021 8.1     aPTT 04/17/2021 80.1*    aPTT 04/17/2021 67.7*    aPTT 04/17/2021 50.5*    Sodium 04/18/2021 139     Potassium reflex Magnesi* 04/18/2021 4.2     Chloride 04/18/2021 104     CO2 04/18/2021 29     Anion Gap 04/18/2021 6     Glucose 04/18/2021 105*    BUN 04/18/2021 13     CREATININE 04/18/2021 0.9     GFR Non- 04/18/2021 >60     GFR  04/18/2021 >60     Calcium 04/18/2021 9.4     WBC 04/18/2021 10.1     RBC 04/18/2021 4.53     Hemoglobin 04/18/2021 13.3*    Hematocrit 04/18/2021 39.9*    MCV 04/18/2021 88.0     MCH 04/18/2021 29.3     MCHC 04/18/2021 33.2     RDW 04/18/2021 18.1*    Platelets 37/15/4728 183     MPV 04/18/2021 8.0     aPTT 04/18/2021 56.5*    Sodium 04/19/2021 141     Potassium reflex Magnesi* 04/19/2021 4.2     Chloride 04/19/2021 107     CO2 04/19/2021 27     Anion Gap 04/19/2021 7     Glucose 04/19/2021 118*    BUN 04/19/2021 11     CREATININE 04/19/2021 0.8*    GFR Non- 04/19/2021 >60     GFR  04/19/2021 >60     Calcium 04/19/2021 8.7     WBC 04/19/2021 10.0     RBC 04/19/2021 4.09*    Hemoglobin 04/19/2021 12.0*    Hematocrit 04/19/2021 36.2*    MCV 04/19/2021 88.4     MCH 04/19/2021 29.4     MCHC 04/19/2021 33.3     RDW 04/19/2021 18.0*    Platelets 19/11/4020 153     MPV 04/19/2021 8.3     aPTT 04/19/2021 55.4*    Ventricular Rate 04/19/2021 62     Atrial Rate 04/19/2021 62     P-R Interval 04/19/2021 194     QRS Duration 04/19/2021 74     Q-T Interval 04/19/2021 394     QTc Calculation (Bazett) 04/19/2021 399     P Axis 04/19/2021 38     R Axis 04/19/2021 -15     T Axis 04/19/2021 110     Diagnosis 04/19/2021 Sinus rhythm with occasional Premature ventricular complexesLow voltage QRSInferior infarct , age undeterminedT wave abnormality, consider lateral ischemiaAbnormal ECGConfirmed by Chet Barnhart MD, MELODY ([id number]) on 4/19/2021 6:06:23 PM     Sodium 04/20/2021 139     Potassium reflex Magnesi* 04/20/2021 3.7     Chloride 04/20/2021 104     CO2 04/20/2021 29     Anion Gap 04/20/2021 6     Glucose 04/20/2021 141*    BUN 04/20/2021 9     CREATININE 04/20/2021 0.8*    GFR Non- 04/20/2021 >60     GFR  04/20/2021 >60     Calcium 04/20/2021 8.9     WBC 04/20/2021 12.3*    RBC 04/20/2021 4.00*    Hemoglobin 04/20/2021 11.6*    Hematocrit 04/20/2021 35.4*    MCV 04/20/2021 88.4     MCH 04/20/2021 29.0     MCHC 04/20/2021 32.8     RDW 04/20/2021 18.5*    Platelets 03/55/5578 168     MPV 04/20/2021 8.3     Potassium 04/20/2021 3.7     Ventricular Rate 04/20/2021 77     Atrial Rate 04/20/2021 77     P-R Interval 04/20/2021 200     QRS Duration 04/20/2021 80     Q-T Interval 04/20/2021 408     QTc Calculation (Bazett) 04/20/2021 461     P Axis 04/20/2021 50     R Axis 04/20/2021 -25     T Axis 04/20/2021 77     Diagnosis 04/20/2021 Sinus rhythm with Fusion complexesLow voltage QRSInferior infarct (cited on or before 19-APR-2021)Abnormal ECGConfirmed by Clare Sung MD, [facility] ([id number]) on 4/20/2021 11:38:00 AM     POC ACT LR 04/19/2021 154     POC ACT LR 04/19/2021 137     POC ACT LR 04/19/2021 212     POC ACT LR 04/19/2021 230     POC ACT LR 04/19/2021 233     POC ACT LR 04/19/2021 275     POC ACT LR 04/19/2021 309     POC ACT LR 04/19/2021 300     POC ACT LR 04/19/2021 309     POC ACT LR 04/19/2021 268     POC ACT LR 04/19/2021 321     POC ACT LR 04/19/2021 254     POC ACT LR 04/19/2021 311     POC ACT LR 04/19/2021 290     POC ACT LR 04/19/2021 281    No results displayed because visit has over 200 results. No results found for this visit on 05/05/21. Assessment:       1. Pain from implanted hardware, initial encounter    2. Dizziness    3. Pain and swelling of right lower leg    4.  Cough        No results found for this visit on 05/05/21. Plan:       Shruthi Stallworth was seen today for leg swelling. Diagnoses and all orders for this visit:    Pain from implanted hardware, initial encounter  -     XR FOOT LEFT (MIN 3 VIEWS); Future  This likely will need evaluation by orthopedist since the hardware does appear to be protruding      Dizziness  Patient already referred to  cardiology  Heart monitor x 4 weeks  Trial of the following  -     meclizine (ANTIVERT) 25 MG tablet; Take 1 tablet by mouth 3 times daily as needed for Dizziness    Pain and swelling of right lower leg  Reviewed and discussed patient's right lower extremity venous Doppler that was negative for any DVT or SVT. Patient states that the area is still slightly tender but has significantly improved    Cough  The patient states that the [address] has significantly helped. He states that his cough is almost completely resolved      Patient has been instructed call the office immediately with new symptoms, change in symptoms or worseningof symptoms. If this is not feasible, patient is instructed to report to the emergency room. Medication profile reviewed. Medication side effects and possible impairments from medications were discussed as applicable. Allergies were reviewed. Health maintenance was reviewed and updated as appropriate. No follow-ups on file. (Comment: Please note this report has been produced using a combination of typing and speech recognition software and may contain errors related to that system including errors in grammar, punctuation, and spelling, as well as words and phrases that may be inappropriate.  If there are any questions or concerns please feel free to contact the dictating provider for clarification.)

## 2021-05-10 ENCOUNTER — TELEPHONE (OUTPATIENT)
Dept: CARDIOLOGY CLINIC | Age: 61
End: 2021-05-10

## 2021-05-10 DIAGNOSIS — G47.30 SLEEP APNEA, UNSPECIFIED TYPE: Primary | ICD-10-CM

## 2021-05-10 NOTE — TELEPHONE ENCOUNTER
Notified patient of cardiac event monitor results. He reports he has COPD and follows with pulmonology. He has been told he may have sleep apnea in the past. Referral placed and phone number given to patient.

## 2021-05-11 ENCOUNTER — OFFICE VISIT (OUTPATIENT)
Dept: ORTHOPEDIC SURGERY | Age: 61
End: 2021-05-11

## 2021-05-11 ENCOUNTER — TELEPHONE (OUTPATIENT)
Dept: ORTHOPEDIC SURGERY | Age: 61
End: 2021-05-11

## 2021-05-11 ENCOUNTER — TELEPHONE (OUTPATIENT)
Dept: CARDIOLOGY CLINIC | Age: 61
End: 2021-05-11

## 2021-05-11 VITALS — HEIGHT: 64 IN | WEIGHT: 281 LBS | BODY MASS INDEX: 47.97 KG/M2

## 2021-05-11 DIAGNOSIS — T84.84XA PAINFUL ORTHOPAEDIC HARDWARE (HCC): Primary | ICD-10-CM

## 2021-05-11 PROCEDURE — 99243 OFF/OP CNSLTJ NEW/EST LOW 30: CPT | Performed by: ORTHOPAEDIC SURGERY

## 2021-05-11 NOTE — LETTER
[address] [place] [address] Ortho & Spine  Surgery Scheduling Form:      DEMOGRAPHICS:                                                                                                              .    Patient Name:  Avery Weiss  Patient :  1960   Patient SS#:      Patient Phone:  [phone] (home)  Alt. Patient Phone:    Patient Address:  [address] [address] [facility]. [address]    PCP:  ANCIETO Lang CNP  Insurance:   Payor/Plan Subscr  Sex Relation Sub. Ins. ID Effective Group Num   1. [place]MIN[place] Cons 1960 Male Self [id number]                                    [address] [facility] [address]   Insurance ID Number:  DIAGNOSIS & PROCEDURE:                                                                                            .    Diagnosis:   Left foot painful hardware   T84.84XA  Operation:  Left foot screw removal     Location:  [place]  Surgeon:  Jazmin Lowe MD    SCHEDULING INFORMATION:                                                                                         .    Surgeon's Scheduling Instruction:  2021  Requested Date:  2021  OR Time:  0730 Patient Arrival Time:  0600  OR Time Required:  15  Minutes  Anesthesia:  General    SA Required:  Yes  Equipment:  n/a  Mini C-Arm:  No    Standard C-Arm:  Yes  Status:  Outpatient    PAT Required:  Yes  Latex Allergy:  unknown    Defibrilator or Pacemaker:  unknown  Isolation Precautions:  unknown  Comments: rapid covid-19 test DOS                      Fernanda Huggins MD 21   BILLING INFORMATION:                                                                                                    .    Procedure:       CPT Code Modifier                  Pre-Certification:

## 2021-05-11 NOTE — PROGRESS NOTES
CHIEF COMPLAINT: Left medial midfoot pain/ painful deep implant, screw. HISTORY:  Mr. Venegas Notice 64 y.o.  male presents today for consultation request from ANICETO Butt CNP for evaluation of left medial midfoot pain which started years ago. He had surgery left foot about 35 years ago.  He is complaining of sharp  pain. Pain is increase with standing and walking and shoe wear. Pain is sharp with first few steps, dull achy pain by the end of the day. The pain radiates to medial leg, and no numbness and tingling sensation. No other complaint.       Past Medical History:   Diagnosis Date    Acute diverticulitis 04/10/2021    Acute kidney injury ([address] 04/08/2021    Acute on chronic respiratory failure with hypoxemia (HCC)     Acute respiratory failure ([address] 08/19/2020    Acute respiratory failure with hypoxia ([address]     Anxiety 01/06/2020    Aortic valve calcification 09/2020    seen on lung CT    CAD in native artery 04/14/2021    Chronic obstructive pulmonary disease ([address] 09/03/2019    PFT done 9/03/19    Chronic systolic congestive heart failure ([address] 04/10/2021    Class 3 severe obesity with body mass index (BMI) of 45.0 to 49.9 in adult [facility])     Coronary artery calcification 09/2020    seen on lung ct    Coronary artery disease involving native heart with angina pectoris ([address] 09/22/2020    Crush injury of right foot 07/23/2015    DDD (degenerative disc disease), lumbar 01/06/2020    Depression 01/06/2020    Diverticulitis of colon 04/10/2021    Erectile dysfunction 01/06/2020    Fatigue 01/06/2020    History of alcohol abuse 01/06/2020    History of drug use     HNP (herniated nucleus pulposus), lumbar 01/06/2020    Hyperglycemia 01/06/2020    Hyperlipidemia     Hypersomnia 01/06/2020    Insomnia     Ischemic cardiomyopathy     Kidney stone     Lumbar radiculopathy 01/06/2020    Lumbar spine pain 01/06/2020    LVH (left ventricular hypertrophy)     seen on echo 8/2020, moderate    Mobitz type I Wenckebach atrioventricular block 04/10/2021    Moderate protein-calorie malnutrition ([facility] 03/17/2020    Observed sleep apnea 01/06/2020    OANH (obstructive sleep apnea) 01/06/2020    Pneumonia, primary atypical     Reactive depression 01/06/2020    S/P three vessel coronary artery bypass 10/26/2020    Spondylosis without myelopathy or radiculopathy, lumbar region 01/06/2020    Tobacco abuse 01/06/2020    Tobacco use 01/06/2020       Past Surgical History:   Procedure Laterality Date    CHOLECYSTECTOMY      CORONARY ARTERY BYPASS GRAFT N/A 9/25/2020    CORONARY ARTERY BYPASS GRAFTING X3, INTERNAL MAMMARY ARTERY, SAPHENOUS VEIN GRAFT, ON PUMP, STERNAL PLATING, 5 LEVEL BILATERAL INTERCOSTAL NERVE BLOCK, PLATELET GEL APPLICATION performed by Eddy Price MD at [address] [facility]  03/16/2011    cystoscopy left ureteroscopy, left retrograde, double J stent placement    FINGER AMPUTATION Right 2/2/2021    RIGHT MIDDLE FINGER IRRIGATION AND DEBRIDEMENT WITH REVISION AMPUTATION AND BONE SHORTENING, POSSIBLE NAIL ABLATION performed by Timo Whitley MD at [address] [facility] 12/24/2019    RIGHT LUMBAR FIVE SACRAL ONE TRANSFORAMINAL EPIDURAL STEROID INJECTION SITE CONFIRMED BY FLUOROSCOPY performed by Mendez Theodore MD at [address] [facility] Right 1/7/2020    RIGHT LUMBAR FOUR AND LUMBAR FIVE TRANSFORAMINAL EPIDURAL STEROID INJECTION SITE CONFIRMED BY FLUOROSCOPY performed by Mendez Theodore MD at [address] [facility] [address] History     Socioeconomic History    Marital status:      Spouse name: Not on file    Number of children: Not on file    Years of education: Not on file    Highest education level: Not on file   Occupational History    Not on file   Social Needs    Financial resource strain: Not on file    Food insecurity     Worry: Not on file     Inability: Not on file   [place] Transportation needs     Medical: Not on file     Non-medical: Not on file   Tobacco Use    Smoking status: Former Smoker     Packs/day: 2.00     Years: 35.00     Pack years: 70.00     Types: Cigarettes     Quit date: 3/1/2020     Years since quittin.1    Smokeless tobacco: Never Used   Substance and Sexual Activity    Alcohol use: No    Drug use: Not Currently     Comment: hx of drug use    Sexual activity: Not on file   Lifestyle    Physical activity     Days per week: Not on file     Minutes per session: Not on file    Stress: Not on file   Relationships    Social connections     Talks on phone: Not on file     Gets together: Not on file     Attends Faith service: Not on file     Active member of club or organization: Not on file     Attends meetings of clubs or organizations: Not on file     Relationship status: Not on file    Intimate partner violence     Fear of current or ex partner: Not on file     Emotionally abused: Not on file     Physically abused: Not on file     Forced sexual activity: Not on file   Other Topics Concern    Not on file   Social History Narrative    Not on file       Family History   Problem Relation Age of Onset    Heart Disease Mother     Other Mother         copd    Liver Disease Father     High Blood Pressure Sister     No Known Problems Sister        Current Outpatient Medications on File Prior to Visit   Medication Sig Dispense Refill    meclizine (ANTIVERT) 25 MG tablet Take 1 tablet by mouth 3 times daily as needed for Dizziness 30 tablet 0    nitroGLYCERIN (NITROSTAT) 0.4 MG SL tablet Place 1 tablet under the tongue every 5 minutes as needed for Chest pain 25 tablet 3    furosemide (LASIX) 80 MG tablet Take 0.5 tablets by mouth daily 45 tablet 3    metoprolol succinate (TOPROL XL) 25 MG extended release tablet Take 1 tablet by mouth daily 30 tablet 11    ipratropium (ATROVENT) 0.02 % nebulizer solution Take 2.5 mLs by nebulization 3 times daily 225 mL 3    traZODone (DESYREL) 100 MG tablet Take 1 tablet by mouth nightly (Patient taking differently: Take 100 mg by mouth 2 times daily Am pm) 30 tablet 5    sertraline (ZOLOFT) 100 MG tablet Take 1 tablet by mouth daily 30 tablet 3    clopidogrel (PLAVIX) 75 MG tablet Take 1 tablet by mouth daily 90 tablet 3    atorvastatin (LIPITOR) 80 MG tablet Take 1 tablet by mouth nightly 90 tablet 3    aspirin 81 MG EC tablet Take 1 tablet by mouth daily 30 tablet 0    albuterol (PROVENTIL) (2.5 MG/3ML) 0.083% nebulizer solution Take 3 mLs by nebulization every 6 hours as needed for Wheezing or Shortness of Breath 25 vial 1    albuterol sulfate HFA (PROVENTIL HFA) 108 (90 Base) MCG/ACT inhaler Inhale 2 puffs into the lungs every 6 hours as needed for Wheezing 1 Inhaler 0     No current facility-administered medications on file prior to visit. Pertinent items are noted in HPI  Review of systems reviewed from Patient History Form dated on 5/11/2021 and available in the patient's chart under the Media tab. No change noted. PHYSICAL EXAMINATION:  Mr. Porter Choi is a very pleasant 64 y.o.  male who presents today in no acute distress, awake, alert, and oriented. He is well dressed, nourished and  groomed. Patient with normal affect. Height is  5' 4\" (1.626 m), weight is 281 lb (127.5 kg), Body mass index is 48.23 kg/m². Resting respiratory rate is 16. Examination of the gait, showed that the patient walks heel-toe with a minimal limp.  Examination of both ankles showing dorsiflexion to about 10 degrees bilaterally, which increased with knee flexion. He has intact sensation and good pedal pulses.  He has weak inversion, and has tenderness on deep palpation over the left navicular bone with prominent screw medial midfoot at navicular compared to the other side.  The ankles are stable to drawer test bilaterally, equally.  He unable to perform single leg toe raise.     IMAGING: Xray's were reviewed, 3 views of the right foot taken 5/5/2021, and showed no acute fracture. A prominent screw medial midfoot at navicular bone. Mild talo-navicular arthritis. IMPRESSION: Left medial midfoot pain/ painful deep implant, screw. PLAN:  I discussed with Amna Shen the treatment options including both surgical and non-surgical treatment, and that my recommendation is removal of the deep screw. I discussed the risks and benefits of surgery with the patient, including but not limited to infection, bleeding, pain, injury to nerves or blood vessels failure of the surgery and need for additional surgery. All the patient's questions were answered. We discussed an expected post-operative course. He  is understanding of this and wishes to proceed. Thank you very much for the kind consultation and allowing me to participate in this patient's care. I will continue to keep you apprised of his progress.         Manoj Weeks MD

## 2021-05-12 DIAGNOSIS — I44.2 CHB (COMPLETE HEART BLOCK) (HCC): ICD-10-CM

## 2021-05-12 NOTE — TELEPHONE ENCOUNTER
Ongoing SW/CM Assessment/Plan of Care Note     See SW/CM flowsheets for goals and other objective data.    Met with patient at bedside. Waiting on workup results, continues to feel SOB, on IV steroids. No new need at this time. AA when ready for d/c    PT Recommendation:     Recommendation for Discharge: PT IL: Patient needs nondaily skilled therapy after discharge, Patient requires 24 hour nonskilled assistance to perform mobility and/or ADLs safely    OT Recommendation:     Recommendations for Discharge: OT IL: Patient requires 24 hour nonskilled assistance to perform mobility and/or ADLs safely             GLORIA OOT please route accordingly. Thank you.

## 2021-05-13 ENCOUNTER — TELEPHONE (OUTPATIENT)
Dept: ORTHOPEDIC SURGERY | Age: 61
End: 2021-05-13

## 2021-05-13 NOTE — TELEPHONE ENCOUNTER
Albertina Garcia with [facility] called, pt is having surgery on Monday and needs clearance asap. Please place letter in epic.

## 2021-05-14 NOTE — PROGRESS NOTES
CARDIOLOGY FOLLOW-UP VISIT        Patient Name: Terry Burden  Primary Care physician: ANICETO Wong - CNP  Reason for Referral/Chief complaint: no complaints today    Subjective:     Terry Burden is a pleasant 64 y.o. man with prior history notable for obstructive sleep apnea noncompliant with CPAP previously, Olvin ACUNA with 2:1, congestive heart failure with ischemic cardiomyopathy, coronary artery disease status post coronary artery bypass with course complicated with vein graft failure x 2, status post PCI LM/D1, and previous smoker. Patient returns today for follow up status post recent intervention. I first evaluated the patient at [facility] 9/25/20 at which time he was treated for unstable angina. Transferred to [facility] for catheterization which revealed multi-vessel coronary artery disease. He underwent three-vessel bypass with Dr. Sarah Peters, receiving a LIMA to LAD, SVG to OM and SVG to diagonal. Limited ECHO 9/29/20 confirmed Normal left ventricular systolic function with ejection fraction of 55-60%. At subsequent visits in interim he complained of worsening dyspnea with exertion. Diuretics were up-titrated without relief of symptoms. Repeat echo showed decline in LVEF since bypass. Ischemic evaluation was planned but in interim he was admitted 4/8/-4/2021 due to chest pain. He underwent LHC on 4/14/21 which noted patent LIMA-LAD and occluded vein grafts. On 4/19/21, he underwent rotational atherectomy of the left main, LAD and first diagonal with PCI/ISREAL to D1 as well as LM/LAD. He was readmitted in short order for arm pain at site of prior PICC line. Found to have superficial blood clot in left basilic vein. No anti-coagulation was recommended. On last evaluation 04/28/21 he reports doing well. Noted he had stopped cardiac rehab due to work obligations.  In the interim, His cardiac monitor from 05/12/21 showed nocturnal 2:1 and complete heart block at times, ~2 % burden. Sleep study strongly advised. EP follow up planned. Today, he reports he just returned from [facility] from [facility]. He is walking a mile a day at the park and working out in the gym on treadmill and stationary bike three days a week for up to 1.5 hrs at a time. His home BP is 122/83. He denies angina or limiting dyspnea with exertion/workouts. He has not needed his sublingual nitroglycerin nor his nebulizer. He really feels great. Denies dizziness, presyncope or syncope. Denies personal nocturnal dyspnea, orthopnea or lower extremity edema. Maintained on 40 mg oral Lasix daily which works well for him. He is requesting cardiac clearance for left foot surgery for a screw that has come loose from prior repair. He had planned to be retested for sleep apnea with Dr. Fvaian Smith at [facility] per our previous discussions but failed to follow-up. The patient is compliant with medications. Cost of medications is affordable. No endorsed side effects. He denies any evidence of hematemesis, hemoptysis, melena, hematochezia or hematuria with blood thinner. Home Medications:  Were reviewed and are listed in nursing record and/or below  Prior to Admission medications    Medication Sig Start Date End Date Taking?  Authorizing Provider   meclizine (ANTIVERT) 25 MG tablet Take 1 tablet by mouth 3 times daily as needed for Dizziness 5/5/21  Yes ANICETO Koch CNP   nitroGLYCERIN (NITROSTAT) 0.4 MG SL tablet Place 1 tablet under the tongue every 5 minutes as needed for Chest pain 4/29/21  Yes Owen León MD   furosemide (LASIX) 80 MG tablet Take 0.5 tablets by mouth daily 4/20/21 4/20/22 Yes ANICETO Robbins CNP   metoprolol succinate (TOPROL XL) 25 MG extended release tablet Take 1 tablet by mouth daily 4/20/21  Yes ANICETO Robbins CNP   ipratropium (ATROVENT) 0.02 % nebulizer solution Take 2.5 mLs by nebulization 3 times daily 3/2/21  Yes ANICETO Koch CNP traZODone (DESYREL) 100 MG tablet Take 1 tablet by mouth nightly  Patient taking differently: Take 100 mg by mouth 2 times daily Am pm 3/1/21  Yes ANICETO Yeager CNP   sertraline (ZOLOFT) 100 MG tablet Take 1 tablet by mouth daily 3/1/21  Yes ANICETO Yeager CNP   clopidogrel (PLAVIX) 75 MG tablet Take 1 tablet by mouth daily 1/13/21  Yes Godfrey Spence MD   atorvastatin (LIPITOR) 80 MG tablet Take 1 tablet by mouth nightly 10/28/20  Yes Godfrey Spence MD   aspirin 81 MG EC tablet Take 1 tablet by mouth daily 9/30/20  Yes ANICETO Ocasio CNP   albuterol (PROVENTIL) (2.5 MG/3ML) 0.083% nebulizer solution Take 3 mLs by nebulization every 6 hours as needed for Wheezing or Shortness of Breath 9/25/19  Yes ANICETO Yeager CNP   albuterol sulfate HFA (PROVENTIL HFA) 108 (90 Base) MCG/ACT inhaler Inhale 2 puffs into the lungs every 6 hours as needed for Wheezing 5/17/19  Yes Wilmer Hart MD   OXYGEN Inhale 2 L into the lungs as needed  Patient not taking: Reported on 5/17/2021    Historical Provider, MD   Reports he is not taking digoxin, potassium, furosemide or statin presently. He is taking Plavix in addition aspirin. CURRENT Medications:  No current facility-administered medications for this visit. Allergies:  Dilaudid [hydromorphone hcl] and Codeine     Review of Systems:   A 14 point review of symptoms completed. Pertinent positives identified in the HPI, all other review of symptoms negative.         Objective:     Vitals:    05/17/21 0817   BP: 110/60   Pulse: 84   SpO2: 95%   Weight: 278 lb 8 oz (126.3 kg)   Height: 5' 4\" (1.626 m)          Wt Readings from Last 3 Encounters:   05/17/21 278 lb 8 oz (126.3 kg)   05/11/21 281 lb (127.5 kg)   05/05/21 281 lb (127.5 kg)       PHYSICAL EXAM:    General:  Alert, cooperative, no distress, appears stated age   Head:  Normocephalic, atraumatic   Eyes:  Conjunctiva/corneas clear, anicteric sclerae    Nose: Nares normal, no drainage or sinus tenderness   Throat: No abnormalities of the lips, oral mucosa or tongue. Moist mucous membranes. Neck: Trachea midline. Neck supple with no lymphadenopathy, thyroid not enlarged, symmetric, no tenderness/mass/nodules, no Jugular venous pressure elevation    Lungs:   Clear to auscultation bilaterally, no wheezes, no rales, no respiratory distress   Chest Wall:  Well-healed sternotomy incision. Heart:  Regular rate and rhythm, normal S1, normal S2, no murmur, no rub, no S3/S4, PMI non-palpable. Abdomen:   Obese, soft, non-tender, with normoactive bowel sounds. No masses, no hepatosplenomegaly   Extremities: No cyanosis, clubbing. He has trace pitting of the LE bilaterally. Incision for vein harvest right lower extremity well-healed. Vascular: 2+ radial, dorsalis pedis and posterior tibial pulses bilaterally. Brisk carotid upstrokes without carotid bruit. Skin: Skin color, texture, turgor are normal with no rashes or ulceration. Pysch: Euthymic mood, appropriate affect   Neurologic: Oriented to person, place and time. No slurred speech or facial asymmetry. No motor or sensory deficits on gross examination.          Labs:   CBC:   Lab Results   Component Value Date    WBC 10.0 04/22/2021    RBC 3.84 04/22/2021    HGB 11.4 04/22/2021    HCT 34.6 04/22/2021    MCV 90.1 04/22/2021    RDW 18.4 04/22/2021     04/22/2021     CMP:  Lab Results   Component Value Date     04/22/2021    K 3.5 04/22/2021     04/22/2021    CO2 26 04/22/2021    BUN 8 04/22/2021    CREATININE 0.7 04/22/2021    GFRAA >60 04/22/2021    GFRAA >60 03/16/2011    AGRATIO 1.1 04/21/2021    LABGLOM >60 04/22/2021    GLUCOSE 102 04/22/2021    PROT 5.8 04/21/2021    PROT 6.9 03/16/2011    CALCIUM 8.4 04/22/2021    BILITOT <0.2 04/21/2021    ALKPHOS 70 04/21/2021    AST 19 04/21/2021    ALT 18 04/21/2021     PT/INR:  No results found for: PTINR  HgBA1c:  Lab Results   Component Value Date    LABA1C 6.0 09/25/2020     Lab Results   Component Value Date    TROPONINI <0.01 04/21/2021     Fasting lipid profile from 9/25/2020 reviewed. HDL 34, LDL 16, triglycerides 173, total cholesterol 85. Lab Results   Component Value Date    CHOL 85 09/25/2020    CHOL 142 09/19/2020    CHOL 152 09/11/2020     Lab Results   Component Value Date    TRIG 173 (H) 09/25/2020    TRIG 217 (H) 09/19/2020    TRIG 275 (H) 09/11/2020     Lab Results   Component Value Date    HDL 35 (L) 01/13/2021    HDL 34 (L) 09/25/2020    HDL 33 (L) 09/19/2020     Lab Results   Component Value Date    LDLCALC 33 01/13/2021    LDLCALC 16 09/25/2020    LDLCALC 66 09/19/2020     Lab Results   Component Value Date    LABVLDL 26 01/13/2021    LABVLDL 35 09/25/2020    LABVLDL 43 09/19/2020     No results found for: CHOLHDLRATIO    Interval Testing/Data:   Prior EKGs personally reviewed. ECHO 3/12/21  Summary   Limited echo for left ventricle systolic function. Definity is used for   myocardial border enhancement. LV systolic function is mildly reduced with a visually estimated EF=45-50%. The left ventricle is normal in size with normal wall thickness. More prominent Inferior HK on certain views. Indeterminate diastolic function. Limited ECHO 9/29/20   Summary   Limited only f/u for LVEF post op CABG   Technically difficult examination. Normal left ventricular systolic function with ejection fraction of 55-60%. No regional wall motion abnormalites are seen. Compared to previous study from 8- no changes noted in left   ventricular function. [facility] 4/19/21   Left heart catheterization     LVEDP  5      See diagnostic catheterization report for full details, would add that there is severe tortuosity and eccentric plaque within the left main, LAD and D1 which proximal to mid was 75% and distally was 99%.        CONCLUSIONS:      Successful rotational atherectomy and PCI of D1 with single drug-eluting stent  Successful rotational history of congestive heart failure with preserved LV function with admission in August of 2020, obesity, prior 5 pack per day smoker for 15-20 years, chronic obstructive pulmonary disease on nocturnal oxygen, obstructive sleep apnea non-compliant with CPAP. He presented in September 2020 with unstable angina and was transferred to [facility] for cardiac catheterization which disclosed multivessel disease. He subsequently underwent three-vessel bypass with Dr. Brian Kumar. He was noted to have worsening dyspnea with exertion in the months following bypass. Repeat echo showed mild LV dysfunction with inferior wall hypokinesis. He was taken for repeat cardiac catheterization which showed patent LIMA-LAD and occluded vein grafts. Patient was re-evaluate with CT surgery and felt PCI was best available option. Patient went back to cath lab for rotation atherectomy of LM/LAD/D1 with PCI/ISREAL to LM/LAD and D1 on 4/19/21. Left circumflex not amenable to PCI and medically treated. Patient returns the office today. Reports that he is feeling well. Exam reassuring. BP at goal. Doing well since recent intervention. Declines cardiac rehab. Performing exercise on his own and progressing well. Recent cardiac monitor study did show nocturnal heart block felt related to untreated sleep apnea. 1.  Coronary artery disease manifest with unstable angina status post bypass 9/2020 with occluded SVG's to LCx and D1 noted on repeat cath, status post rotation atherectomy/PCI 4/19/2021 with Dr. Tim Kuhn  2. Chronic congestive heart failure with reduced EF, compensated by exam today  3. Nocturnal 2:1 and complete heart block - suspected secondary to #5  4. Chronic obstructive pulmonary disease on nocturnal oxygen therapy  5. Obstructive sleep apnea noncompliant with CPAP  6. Obesity  7. Hypertension, controlled  8. Tobacco abuse, now quit.     Patient Active Problem List   Diagnosis    Crush injury of right foot    Shortness of breath    Chest pain    Erectile dysfunction    Hyperglycemia    Anxiety    Depression    Tobacco abuse    History of alcohol abuse    Fatigue    OANH (obstructive sleep apnea)    Hypersomnia    Chronic obstructive pulmonary disease (HCC)    Lumbar radiculopathy    HNP (herniated nucleus pulposus), lumbar    Spondylosis without myelopathy or radiculopathy, lumbar region    DDD (degenerative disc disease), lumbar    Lumbar spine pain    Acute respiratory distress    Class 3 severe obesity with body mass index (BMI) of 45.0 to 49.9 in adult [facility])    Pneumonia, primary atypical    Acute respiratory failure with hypoxia (HCC)    Moderate protein-calorie malnutrition (HCC)    Acute respiratory failure (HCC)    Acute on chronic respiratory failure with hypoxemia (HCC)    Acute diastolic congestive heart failure (HCC)    Hyperlipidemia    Insomnia    Morbid obesity with BMI of 45.0-49.9, adult (HCC)    Abnormal cardiovascular stress test    Coronary artery disease involving native heart    S/P three vessel coronary artery bypass    Acute kidney injury ([address]    Chronic combined systolic and diastolic congestive heart failure (HCC)    Mobitz type I Wenckebach atrioventricular block    Asystole (HCC)    Ischemic cardiomyopathy    Diverticulitis of colon    Coronary artery disease involving native coronary artery of native heart with unstable angina pectoris (HCC)    CHB (complete heart block) (HCC)    Sinus bradycardia    Cellulitis    Acute deep vein thrombosis (DVT) of left upper extremity (HCC)    Cellulitis of chest wall    Painful orthopaedic hardware ([address]       PLAN:  1. Pulmonary referral for sleep study-expedite as able given cardiac monitor findings. Will need reassessment with electrophysiology following initiation of treatment. Appointment planned 7/1/2021.  2.  Continue dual antiplatelet therapy with no interruption at least x3 months.   At this point, beyond July 19, we

## 2021-05-17 ENCOUNTER — TELEPHONE (OUTPATIENT)
Dept: ORTHOPEDIC SURGERY | Age: 61
End: 2021-05-17

## 2021-05-17 ENCOUNTER — OFFICE VISIT (OUTPATIENT)
Dept: CARDIOLOGY CLINIC | Age: 61
End: 2021-05-17
Payer: COMMERCIAL

## 2021-05-17 VITALS
HEIGHT: 64 IN | OXYGEN SATURATION: 95 % | SYSTOLIC BLOOD PRESSURE: 110 MMHG | DIASTOLIC BLOOD PRESSURE: 60 MMHG | WEIGHT: 278.5 LBS | HEART RATE: 84 BPM | BODY MASS INDEX: 47.55 KG/M2

## 2021-05-17 DIAGNOSIS — G47.33 OSA (OBSTRUCTIVE SLEEP APNEA): ICD-10-CM

## 2021-05-17 DIAGNOSIS — I25.10 CORONARY ARTERY DISEASE INVOLVING NATIVE CORONARY ARTERY OF NATIVE HEART WITHOUT ANGINA PECTORIS: ICD-10-CM

## 2021-05-17 DIAGNOSIS — E78.2 MIXED HYPERLIPIDEMIA: ICD-10-CM

## 2021-05-17 DIAGNOSIS — I25.5 ISCHEMIC CARDIOMYOPATHY: ICD-10-CM

## 2021-05-17 DIAGNOSIS — I44.2 CHB (COMPLETE HEART BLOCK) (HCC): Primary | ICD-10-CM

## 2021-05-17 DIAGNOSIS — I50.31 ACUTE DIASTOLIC CONGESTIVE HEART FAILURE (HCC): ICD-10-CM

## 2021-05-17 PROCEDURE — 99214 OFFICE O/P EST MOD 30 MIN: CPT | Performed by: INTERNAL MEDICINE

## 2021-05-17 RX ORDER — LOSARTAN POTASSIUM 25 MG/1
12.5 TABLET ORAL DAILY
Qty: 30 TABLET | Refills: 5 | Status: SHIPPED
Start: 2021-05-17 | End: 2021-11-02 | Stop reason: SINTOL

## 2021-05-17 ASSESSMENT — ENCOUNTER SYMPTOMS
COUGH: 1
GASTROINTESTINAL NEGATIVE: 1
BLOOD IN STOOL: 0
ABDOMINAL PAIN: 0
ANAL BLEEDING: 0
NAUSEA: 0
SHORTNESS OF BREATH: 0
EYES NEGATIVE: 1
ALLERGIC/IMMUNOLOGIC NEGATIVE: 1

## 2021-05-17 NOTE — TELEPHONE ENCOUNTER
Patient came into the office stating that he was supposed to have surgery today but it was cancelled until he received cardiac clearance. He said that he saw his cardiologist today and that he will be cleared on July 14. Would like someone to call him back so that he can get his surgery rescheduled.

## 2021-05-17 NOTE — PATIENT INSTRUCTIONS
PLAN:  1. Brissa Muir from a cardiac standpoint to proceed with surgery  2. Pulmonary referral for sleep study  3. Start losartan 12.5 mg daily  4.  Follow up in 3 months

## 2021-05-17 NOTE — LETTER
CARDIOLOGY FOLLOW-UP VISIT        Patient Name: Garrett Storm  Primary Care physician: ANICETO Mosquera - CNP  Reason for Referral/Chief complaint: no complaints today    Subjective:     Garrett Storm is a pleasant 64 y.o. man with prior history notable for obstructive sleep apnea noncompliant with CPAP previously, Olvin ACUNA with 2:1, congestive heart failure with ischemic cardiomyopathy, coronary artery disease status post coronary artery bypass with course complicated with vein graft failure x 2, status post PCI LM/D1, and previous smoker. Patient returns today for follow up status post recent intervention. I first evaluated the patient at [facility] 9/25/20 at which time he was treated for unstable angina. Transferred to [facility] for catheterization which revealed multi-vessel coronary artery disease. He underwent three-vessel bypass with Dr. Ivette Rand, receiving a LIMA to LAD, SVG to OM and SVG to diagonal. Limited ECHO 9/29/20 confirmed Normal left ventricular systolic function with ejection fraction of 55-60%. At subsequent visits in interim he complained of worsening dyspnea with exertion. Diuretics were up-titrated without relief of symptoms. Repeat echo showed decline in LVEF since bypass. Ischemic evaluation was planned but in interim he was admitted 4/8/-4/2021 due to chest pain. He underwent LHC on 4/14/21 which noted patent LIMA-LAD and occluded vein grafts. On 4/19/21, he underwent rotational atherectomy of the left main, LAD and first diagonal with PCI/ISREAL to D1 as well as LM/LAD. He was readmitted in short order for arm pain at site of prior PICC line. Found to have superficial blood clot in left basilic vein. No anti-coagulation was recommended. On last evaluation 04/28/21 he reports doing well. Noted he had stopped cardiac rehab due to work obligations.  In the interim, His cardiac monitor from 05/12/21 showed nocturnal 2:1 and complete heart block at times, ~2 % burden. Sleep study strongly advised. EP follow up planned. Today, he reports he just returned from [facility] from [facility]. He is walking a mile a day at the park and working out in the gym on treadmill and stationary bike three days a week for up to 1.5 hrs at a time. His home BP is 122/83. He denies angina or limiting dyspnea with exertion/workouts. He has not needed his sublingual nitroglycerin nor his nebulizer. He really feels great. Denies dizziness, presyncope or syncope. Denies personal nocturnal dyspnea, orthopnea or lower extremity edema. Maintained on 40 mg oral Lasix daily which works well for him. He is requesting cardiac clearance for left foot surgery for a screw that has come loose from prior repair. He had planned to be retested for sleep apnea with Dr. Hal Dutta at [facility] per our previous discussions but failed to follow-up. The patient is compliant with medications. Cost of medications is affordable. No endorsed side effects. He denies any evidence of hematemesis, hemoptysis, melena, hematochezia or hematuria with blood thinner. Home Medications:  Were reviewed and are listed in nursing record and/or below  Prior to Admission medications    Medication Sig Start Date End Date Taking?  Authorizing Provider   meclizine (ANTIVERT) 25 MG tablet Take 1 tablet by mouth 3 times daily as needed for Dizziness 5/5/21  Yes ANICETO eYager CNP   nitroGLYCERIN (NITROSTAT) 0.4 MG SL tablet Place 1 tablet under the tongue every 5 minutes as needed for Chest pain 4/29/21  Yes Godfrey Spence MD   furosemide (LASIX) 80 MG tablet Take 0.5 tablets by mouth daily 4/20/21 4/20/22 Yes ANICETO Govea CNP   metoprolol succinate (TOPROL XL) 25 MG extended release tablet Take 1 tablet by mouth daily 4/20/21  Yes ANICETO Govea CNP   ipratropium (ATROVENT) 0.02 % nebulizer solution Take 2.5 mLs by nebulization 3 times daily 3/2/21  Yes ANICETO Yeager CNP   traZODone (DESYREL) 100 MG tablet Take 1 tablet by mouth nightly  Patient taking differently: Take 100 mg by mouth 2 times daily Am pm 3/1/21  Yes ANICETO Grant CNP   sertraline (ZOLOFT) 100 MG tablet Take 1 tablet by mouth daily 3/1/21  Yes ANICETO Grant CNP   clopidogrel (PLAVIX) 75 MG tablet Take 1 tablet by mouth daily 1/13/21  Yes Mai Buck MD   atorvastatin (LIPITOR) 80 MG tablet Take 1 tablet by mouth nightly 10/28/20  Yes Mai Buck MD   aspirin 81 MG EC tablet Take 1 tablet by mouth daily 9/30/20  Yes ANICETO Baez CNP   albuterol (PROVENTIL) (2.5 MG/3ML) 0.083% nebulizer solution Take 3 mLs by nebulization every 6 hours as needed for Wheezing or Shortness of Breath 9/25/19  Yes ANICETO Grant CNP   albuterol sulfate HFA (PROVENTIL HFA) 108 (90 Base) MCG/ACT inhaler Inhale 2 puffs into the lungs every 6 hours as needed for Wheezing 5/17/19  Yes Jossie Chauhan MD   OXYGEN Inhale 2 L into the lungs as needed  Patient not taking: Reported on 5/17/2021    Historical Provider, MD   Reports he is not taking digoxin, potassium, furosemide or statin presently. He is taking Plavix in addition aspirin. CURRENT Medications:  No current facility-administered medications for this visit. Allergies:  Dilaudid [hydromorphone hcl] and Codeine     Review of Systems:   A 14 point review of symptoms completed. Pertinent positives identified in the HPI, all other review of symptoms negative.         Objective:     Vitals:    05/17/21 0817   BP: 110/60   Pulse: 84   SpO2: 95%   Weight: 278 lb 8 oz (126.3 kg)   Height: 5' 4\" (1.626 m)          Wt Readings from Last 3 Encounters:   05/17/21 278 lb 8 oz (126.3 kg)   05/11/21 281 lb (127.5 kg)   05/05/21 281 lb (127.5 kg)       PHYSICAL EXAM:    General:  Alert, cooperative, no distress, appears stated age   Head:  Normocephalic, atraumatic   Eyes:  Conjunctiva/corneas clear, anicteric sclerae    Nose: Nares normal, no drainage or sinus tenderness   Throat: No abnormalities of the lips, oral mucosa or tongue. Moist mucous membranes. Neck: Trachea midline. Neck supple with no lymphadenopathy, thyroid not enlarged, symmetric, no tenderness/mass/nodules, no Jugular venous pressure elevation    Lungs:   Clear to auscultation bilaterally, no wheezes, no rales, no respiratory distress   Chest Wall:  Well-healed sternotomy incision. Heart:  Regular rate and rhythm, normal S1, normal S2, no murmur, no rub, no S3/S4, PMI non-palpable. Abdomen:   Obese, soft, non-tender, with normoactive bowel sounds. No masses, no hepatosplenomegaly   Extremities: No cyanosis, clubbing. He has trace pitting of the LE bilaterally. Incision for vein harvest right lower extremity well-healed. Vascular: 2+ radial, dorsalis pedis and posterior tibial pulses bilaterally. Brisk carotid upstrokes without carotid bruit. Skin: Skin color, texture, turgor are normal with no rashes or ulceration. Pysch: Euthymic mood, appropriate affect   Neurologic: Oriented to person, place and time. No slurred speech or facial asymmetry. No motor or sensory deficits on gross examination.          Labs:   CBC:   Lab Results   Component Value Date    WBC 10.0 04/22/2021    RBC 3.84 04/22/2021    HGB 11.4 04/22/2021    HCT 34.6 04/22/2021    MCV 90.1 04/22/2021    RDW 18.4 04/22/2021     04/22/2021     CMP:  Lab Results   Component Value Date     04/22/2021    K 3.5 04/22/2021     04/22/2021    CO2 26 04/22/2021    BUN 8 04/22/2021    CREATININE 0.7 04/22/2021    GFRAA >60 04/22/2021    GFRAA >60 03/16/2011    AGRATIO 1.1 04/21/2021    LABGLOM >60 04/22/2021    GLUCOSE 102 04/22/2021    PROT 5.8 04/21/2021    PROT 6.9 03/16/2011    CALCIUM 8.4 04/22/2021    BILITOT <0.2 04/21/2021    ALKPHOS 70 04/21/2021    AST 19 04/21/2021    ALT 18 04/21/2021     PT/INR:  No results found for: PTINR  HgBA1c:  Lab Results   Component Value Date LABA1C 6.0 09/25/2020     Lab Results   Component Value Date    TROPONINI <0.01 04/21/2021     Fasting lipid profile from 9/25/2020 reviewed. HDL 34, LDL 16, triglycerides 173, total cholesterol 85. Lab Results   Component Value Date    CHOL 85 09/25/2020    CHOL 142 09/19/2020    CHOL 152 09/11/2020     Lab Results   Component Value Date    TRIG 173 (H) 09/25/2020    TRIG 217 (H) 09/19/2020    TRIG 275 (H) 09/11/2020     Lab Results   Component Value Date    HDL 35 (L) 01/13/2021    HDL 34 (L) 09/25/2020    HDL 33 (L) 09/19/2020     Lab Results   Component Value Date    LDLCALC 33 01/13/2021    LDLCALC 16 09/25/2020    LDLCALC 66 09/19/2020     Lab Results   Component Value Date    LABVLDL 26 01/13/2021    LABVLDL 35 09/25/2020    LABVLDL 43 09/19/2020     No results found for: CHOLHDLRATIO    Interval Testing/Data:   Prior EKGs personally reviewed. ECHO 3/12/21  Summary   Limited echo for left ventricle systolic function. Definity is used for   myocardial border enhancement. LV systolic function is mildly reduced with a visually estimated EF=45-50%. The left ventricle is normal in size with normal wall thickness. More prominent Inferior HK on certain views. Indeterminate diastolic function. Limited ECHO 9/29/20   Summary   Limited only f/u for LVEF post op CABG   Technically difficult examination. Normal left ventricular systolic function with ejection fraction of 55-60%. No regional wall motion abnormalites are seen. Compared to previous study from 8- no changes noted in left   ventricular function. [facility] 4/19/21   Left heart catheterization     LVEDP  5      See diagnostic catheterization report for full details, would add that there is severe tortuosity and eccentric plaque within the left main, LAD and D1 which proximal to mid was 75% and distally was 99%.        CONCLUSIONS:      Successful rotational atherectomy and PCI of D1 with single drug-eluting stent  Successful rotational atherectomy and PCI of left main/LAD with single drug-eluting stent  Medical therapy for LCx , this lesion does not appear to be amenable for PCI      [facility] 4/14/2021  LVGRAM     LVEDP  6   GRADIENT ACROSS AORTIC VALVE  none   LV FUNCTION EF 60%   WALL MOTION  normal   MITRAL REGURGITATION  mild         CORONARY ARTERIES     LM Proximal less than 10% stenosis, middistal haziness with calcification and 70% eccentric stenosis. LAD  Proximalmid diffuse tubular 80% stenosis, there is middistal bidirectional flow noted. Distal vessel is visualized on LIMA angiogram, this shows 60% distal stenosis. D1 is a large vessel with tortuosity noted and ostial/proximal 70% stenosis followed by mid vessel calcification with 50% stenosis and middistal 99% stenosis. LCX Medium to large size vessel, proximal 30 to 40% stenosis. OM 3 appears to be the largest OM and is 100% proximalmid , distal vessel is seen through left to left collaterals and appears to have less than 10% stenosis. RCA Dominant, tortuous, proximalmid 60 to 70% stenosis. There are moderate right to left collaterals to the circumflex. BYPASS GRAFTS     LIMA-LAD Widely patent with less than 10% proximalmiddistal stenosis, the distal LAD has stenosis as noted above. SVG-D1  100% proximal/middistal          SVG-circumflex  100% proximalmiddistal             Findings and plans as noted below were discussed with the patient and family, they understand/agree        CONCLUSIONS:      2 out of 3 bypass grafts are occluded, the LIMA to LAD graft is patent  We will consult with CT surgery regarding options for revascularization which potentially may include redo CABG versus high risk PCI of the left main into diagonal  Will order CTA abd/bilat LE w/ runoff, in case cardiac support devices are needed        Cardiac monitor: 05/12/21        Impression and Plan   Mariama Guzman is a 64 y.o. Shortness of breath    Chest pain    Erectile dysfunction    Hyperglycemia    Anxiety    Depression    Tobacco abuse    History of alcohol abuse    Fatigue    OANH (obstructive sleep apnea)    Hypersomnia    Chronic obstructive pulmonary disease (HCC)    Lumbar radiculopathy    HNP (herniated nucleus pulposus), lumbar    Spondylosis without myelopathy or radiculopathy, lumbar region    DDD (degenerative disc disease), lumbar    Lumbar spine pain    Acute respiratory distress    Class 3 severe obesity with body mass index (BMI) of 45.0 to 49.9 in adult [facility])    Pneumonia, primary atypical    Acute respiratory failure with hypoxia (HCC)    Moderate protein-calorie malnutrition (HCC)    Acute respiratory failure (HCC)    Acute on chronic respiratory failure with hypoxemia (HCC)    Acute diastolic congestive heart failure (HCC)    Hyperlipidemia    Insomnia    Morbid obesity with BMI of 45.0-49.9, adult (HCC)    Abnormal cardiovascular stress test    Coronary artery disease involving native heart    S/P three vessel coronary artery bypass    Acute kidney injury ([facility] [address]    Chronic combined systolic and diastolic congestive heart failure (HCC)    Mobitz type I Wenckebach atrioventricular block    Asystole (HCC)    Ischemic cardiomyopathy    Diverticulitis of colon    Coronary artery disease involving native coronary artery of native heart with unstable angina pectoris (HCC)    CHB (complete heart block) (HCC)    Sinus bradycardia    Cellulitis    Acute deep vein thrombosis (DVT) of left upper extremity (HCC)    Cellulitis of chest wall    Painful orthopaedic hardware ([address]       PLAN:  1. Pulmonary referral for sleep study-expedite as able given cardiac monitor findings. Will need reassessment with electrophysiology following initiation of treatment. Appointment planned 7/1/2021.  2.  Continue dual antiplatelet therapy with no interruption at least x3 months.   At this point, beyond July 19, we would consider holding if necessary for foot operation for removal of hardware. 3.  Continue GDMT with metoprolol. Start low-dose losartan 12.5 mg daily  4. Continue nightly statin  5. Maintain smoking cessation  6. We encouraged modest weight loss through implementing appropriate dietary measures as well as initiation of a graded exercise program with the ultimate goal of 150 minutes of aerobic exercise weekly. Weight has been trending down well over time. Follow up in 3 months for reassessment. I advised him to monitor his foot closely and if any signs/symptoms of infection or tissue breakdown developed, we would expedite his surgery. Scribe's attestation: This note was scribed in the presence of Bethany Person MD by Galo Marshall RN      The scribes documentation has been prepared under my direction and personally reviewed by me in its entirety. I confirm that the note above accurately reflects all work, treatment, procedures, and medical decision making performed by me. Jadyn Malin MD, personally performed the services described in this documentation as scribed by Galo Marshall RN   in my presence, and it is both accurate and complete to the best of our ability. I will address the patient's cardiac risk factors and adjusted pharmacologic treatment as needed. In addition, I have reinforced the need for patient directed risk factor modification. All questions and concerns were addressed to the patient/family. Alternatives to my treatment were discussed. Thank you for allowing us to participate in the care of Keila Murillo. Please call me with any questions [phone].     Bethany Person MD, [facility]   Cardiovascular Disease  [facility]  [phone] [facility]  [phone] [address] [place] [address]  5/17/2021 8:52 AM

## 2021-06-21 RX ORDER — BENZONATATE 200 MG/1
CAPSULE ORAL
Qty: 30 CAPSULE | Refills: 0 | Status: SHIPPED | OUTPATIENT
Start: 2021-06-21 | End: 2021-08-09

## 2021-06-30 NOTE — PROGRESS NOTES
[facility]   Electrophysiology Consult Note            Date: 7/1/21  Patient Name: Ivette Hopkins  YOB: 1960    Primary Care Physician: ANICETO Spivey CNP    CHIEF COMPLAINT:   Chief Complaint   Patient presents with   [address] from [facility]     5/17/2021 [facility].  Other     Heart fluttering    Coronary Artery Disease    Congestive Heart Failure     HISTORY OF PRESENT ILLNESS: Ivette Hopkins is a 64 y.o. male with a PMH significant for significant for ICM s/p CABG (09/2020 [facility]), HTN, HLD, morbid obesity, OANH, and COPD (former smoker, quit 8 months ago). In 4/2020 he presented to [facility] with unstable angina, a positive stress test, intermittent 2:1 conduction, and CHB. According to patient he initially presented to the medical system after he was diagnosed with influenza pneumonia for which he required ventilation support for approximately 6 days and a prolonged hospital course of approximate 11 days. He never quite recovered and continued to have issues with shortness of breath. Eventually this led him down the path of newly diagnosed ischemic cardiomyopathy. He subsequently underwent a CABG in September 2020 with Dr. Faith San. Postoperatively he felt much better for approximately a month however he did have new onset dizziness that has been chronic and ongoing. He has had some issues bouncing off of walls intermittently because of his dizziness. Patient was seen by EP originally after being transferred to [facility] from [facility] because of a positive stress test on 4/8/2021. He was transferred to [facility] on 4/14/2021 where he underwent a LHC which demonstrated 2 out of 3 bypass grafts are occluded, the LIMA to LAD graft is patent. High risk PCI of the left main into diagonal. After discharge from hospital, patient wore a cardiac event monitor from 4/20/2021 to 5/2/2021 which demonstrated predominately SR with an average HR of 71 ().  PAC burden 0.63%, PVC burden 0.01%, longest pause lasting 3.5 seconds. Patient presents today, 7/1/2021, for evaluation of nocturnal block on cardiac event monitor. Today, 7/1/2021, ECG demonstrates SR (74). He reports that he experiences episodes at least 2 times per day where he feels his heart flutter and this makes him become short of breath and dizzy at times. He reports that overall, he feels better. 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 He reports that he is taking his medications as prescribed and tolerates them well. Patient denies current edema, chest pain, or syncope. Past Medical History:   has a past medical history of Acute diverticulitis, Acute kidney injury ([address] Acute on chronic respiratory failure with hypoxemia ([address] Acute respiratory failure ([address] Acute respiratory failure with hypoxia ([address] Anxiety, Aortic valve calcification, CAD in native artery, Chronic obstructive pulmonary disease (HCC), Chronic systolic congestive heart failure (HCC), Class 3 severe obesity with body mass index (BMI) of 45.0 to 49.9 in adult [facility]), Coronary artery calcification, Coronary artery disease involving native heart with angina pectoris ([address] Crush injury of right foot, DDD (degenerative disc disease), lumbar, Depression, Diverticulitis of colon, Erectile dysfunction, Fatigue, HNP (herniated nucleus pulposus), lumbar, Hyperglycemia, Hyperlipidemia, Hypersomnia, Hypertension, Insomnia, Ischemic cardiomyopathy, Kidney stone, Lumbar radiculopathy, Lumbar spine pain, LVH (left ventricular hypertrophy), Mobitz type I Wenckebach atrioventricular block, Moderate protein-calorie malnutrition ([address] Observed sleep apnea, OANH (obstructive sleep apnea), Pneumonia, primary atypical, Reactive depression, S/P three vessel coronary artery bypass, Spondylosis without myelopathy or radiculopathy, lumbar region, Tobacco abuse, Tobacco use, and Wears dentures.     Past Surgical History:   has a past surgical history that includes Cholecystectomy; Cystoscopy (03/16/2011); Pain management procedure (Right, 12/24/2019); Pain management procedure (Right, 1/7/2020); Coronary artery bypass graft (N/A, 9/25/2020); Finger amputation (Right, 2/2/2021); and Coronary angioplasty with stent (03/2021). Allergies:  Dilaudid [hydromorphone hcl] and Codeine    Social History:   reports that he quit smoking about a year ago. His smoking use included cigarettes. He has a 70.00 pack-year smoking history. He has never used smokeless tobacco. He reports previous drug use. Drugs: Marijuana and Cocaine. He reports that he does not drink alcohol. Family History: family history includes Heart Disease in his mother; High Blood Pressure in his sister; No Known Problems in his sister; Other in his mother. Home Medications:    Prior to Admission medications    Medication Sig Start Date End Date Taking?  Authorizing Provider   benzonatate (TESSALON) 200 MG capsule TAKE ONE CAPSULE BY MOUTH THREE TIMES A DAY AS NEEDED FOR COUGH 6/21/21  Yes ANICETO Tanner CNP   losartan (COZAAR) 25 MG tablet Take 0.5 tablets by mouth daily 5/17/21  Yes Sammy Cerda MD   OXYGEN Inhale 2 L into the lungs as needed    Yes Historical Provider, MD   meclizine (ANTIVERT) 25 MG tablet Take 1 tablet by mouth 3 times daily as needed for Dizziness 5/5/21  Yes ANCIETO Tanner CNP   nitroGLYCERIN (NITROSTAT) 0.4 MG SL tablet Place 1 tablet under the tongue every 5 minutes as needed for Chest pain 4/29/21  Yes Sammy Cerda MD   furosemide (LASIX) 80 MG tablet Take 0.5 tablets by mouth daily 4/20/21 4/20/22 Yes ANICETO Parra CNP   metoprolol succinate (TOPROL XL) 25 MG extended release tablet Take 1 tablet by mouth daily 4/20/21  Yes ANICETO Parra CNP   ipratropium (ATROVENT) 0.02 % nebulizer solution Take 2.5 mLs by nebulization 3 times daily 3/2/21  Yes ANICETO Tanner CNP   traZODone (DESYREL) 100 MG tablet Take 1 tablet by mouth nightly  Patient taking differently: Take 100 mg by mouth 2 times daily Am pm 3/1/21  Yes ANICETO Soriano CNP   sertraline (ZOLOFT) 100 MG tablet Take 1 tablet by mouth daily 3/1/21  Yes ANICETO Soriano CNP   clopidogrel (PLAVIX) 75 MG tablet Take 1 tablet by mouth daily 1/13/21  Yes Sánchez Sparks MD   atorvastatin (LIPITOR) 80 MG tablet Take 1 tablet by mouth nightly  Patient taking differently: Take 40 mg by mouth nightly  10/28/20  Yes Sánchez Sparks MD   aspirin 81 MG EC tablet Take 1 tablet by mouth daily 9/30/20  Yes ANICETO Garcia CNP   albuterol (PROVENTIL) (2.5 MG/3ML) 0.083% nebulizer solution Take 3 mLs by nebulization every 6 hours as needed for Wheezing or Shortness of Breath 9/25/19  Yes ANICETO Soriano CNP   albuterol sulfate HFA (PROVENTIL HFA) 108 (90 Base) MCG/ACT inhaler Inhale 2 puffs into the lungs every 6 hours as needed for Wheezing 5/17/19  Yes Fredy Beavers MD       REVIEW OF SYSTEMS:    All 14-point review of systems are completed and  pertinent positives are mentioned in the history of present illness. Other  systems are reviewed and are negative. Physical Examination:    /70   Pulse 74   Ht 5' 4\" (1.626 m)   Wt 292 lb 8 oz (132.7 kg)   SpO2 98%   BMI 50.21 kg/m²      Constitutional and General Appearance:    alert, cooperative, no distress and appears stated age  HEENT:    PERRLA, no cervical lymphadenopathy. No masses palpable. Normal oral  mucosa  Respiratory:  · Normal excursion and expansion without use of accessory muscles  · Resp Auscultation: Normal breath sounds without dullness or wheezing  Cardiovascular:  · The apical impulse is not displaced  · RRR S1S2 w/o M/G/R  Abdomen:  · No masses or tenderness  · Bowel sounds present  Extremities:  ·  No Cyanosis or Clubbing  ·  Lower extremity edema: No  · Skin: Warm and dry  Neurological:  · Alert and oriented.   · Moves all extremities well  · No abnormalities of mood, affect, memory, mentation, or behavior are noted    DATA:    ECG 7/1/21: Personally reviewed. [facility] 4/14/2021  CONCLUSIONS:     Successful rotational atherectomy and PCI of D1 with single   drug-eluting stent   Successful rotational atherectomy and PCI of left main/LAD with   single drug-eluting stent   Medical therapy for LCx , this lesion does not appear to be   amenable for PCI     [facility] 4/14/2021  CONCLUSIONS:      2 out of 3 bypass grafts are occluded, the LIMA to LAD graft is patent  We will consult with CT surgery regarding options for revascularization which potentially may include redo CABG versus high risk PCI of the left main into diagonal  Will order CTA abd/bilat LE w/ runoff, in case cardiac support devices are needed    Stress test 4/8/2021  Summary Large area, reversible, moderate to severe intensity perfusion defect  involving the basal to apical anterolateral, mid to apical anterior and  apex. Mixed ischemia/scar of the inferolateral territory. Decreased wall  motion and myocardial thickening of the above segments. Post-stress LVEF  visually estimated 45-50%. Overall findings represent a high risk scan. Limited Echo 3/12/2021  Summary   Limited echo for left ventricle systolic function. Definity is used for   myocardial border enhancement. LV systolic function is mildly reduced with a visually estimated EF=45-50%. The left ventricle is normal in size with normal wall thickness. More prominent Inferior HK on certain views. Indeterminate diastolic function. IMPRESSION:    1. Bradycardia   04/14/2021  Patient is a pleasant 82-year-old male with a medical history for ischemic cardiomyopathy status post CABG in September 2020 who presents from home with unstable angina.   His exercise stress test showed that he had some chronotropic incompetence however he was able to increase his heart rate without heart block which is helpful from a conduction standpoint anticoagulation:  NA     All questions and concerns were addressed to the patient/family. Alternatives to my treatment were discussed. Dr. Rossana Arias MD  Electrophysiology  [facility] [facility] [address]. [address]. [place]  Phone: [phone]  Fax: [phone]     NOTE: This report was transcribed using voice recognition software. Every effort was made to ensure accuracy, however, inadvertent computerized transcription errors may be present. Milan Calderón RN, am scribing for and in the presence of Dr. Vivi Grier. 07/01/21 8:34 AM   Shaina Angelo RN    The scribe's documentation has been prepared under my direction and personally reviewed by me in its entirety. I confirm that the note above accurately reflects all work, physical examination, the discussion of treatments and procedures, and medical decision making performed by me. Ashley Younger MD personally performed the services described in this documentation as scribed by nurse in my presence, and is both accurate and complete.     Electronically signed by Mathieu Solitario MD on 7/1/2021 at 8:53 AM

## 2021-07-01 ENCOUNTER — OFFICE VISIT (OUTPATIENT)
Dept: CARDIOLOGY CLINIC | Age: 61
End: 2021-07-01
Payer: COMMERCIAL

## 2021-07-01 ENCOUNTER — TELEPHONE (OUTPATIENT)
Dept: CARDIOLOGY CLINIC | Age: 61
End: 2021-07-01

## 2021-07-01 VITALS
HEART RATE: 74 BPM | HEIGHT: 64 IN | OXYGEN SATURATION: 98 % | SYSTOLIC BLOOD PRESSURE: 110 MMHG | DIASTOLIC BLOOD PRESSURE: 70 MMHG | BODY MASS INDEX: 49.94 KG/M2 | WEIGHT: 292.5 LBS

## 2021-07-01 DIAGNOSIS — I44.1 MOBITZ TYPE I WENCKEBACH ATRIOVENTRICULAR BLOCK: ICD-10-CM

## 2021-07-01 DIAGNOSIS — I50.42 CHRONIC COMBINED SYSTOLIC AND DIASTOLIC CONGESTIVE HEART FAILURE (HCC): ICD-10-CM

## 2021-07-01 DIAGNOSIS — I44.2 CHB (COMPLETE HEART BLOCK) (HCC): ICD-10-CM

## 2021-07-01 DIAGNOSIS — R00.1 SINUS BRADYCARDIA: Primary | ICD-10-CM

## 2021-07-01 DIAGNOSIS — I50.31 ACUTE DIASTOLIC CONGESTIVE HEART FAILURE (HCC): ICD-10-CM

## 2021-07-01 DIAGNOSIS — E78.2 MIXED HYPERLIPIDEMIA: ICD-10-CM

## 2021-07-01 PROCEDURE — 93000 ELECTROCARDIOGRAM COMPLETE: CPT | Performed by: INTERNAL MEDICINE

## 2021-07-01 PROCEDURE — 99214 OFFICE O/P EST MOD 30 MIN: CPT | Performed by: INTERNAL MEDICINE

## 2021-07-01 NOTE — PATIENT INSTRUCTIONS
RECOMMENDATIONS:  1. Discussed risks and benefits of ILR placement given your ongoing symptoms. Patient would like to proceed with this option. 2. Continue medications as prescribed. 3. Encourage activity as tolerated. 4. Follow up after ILR placement.

## 2021-07-01 NOTE — LETTER
[address]   Electrophysiology Consult Note            Date: 7/1/21  Patient Name: Austen Lizarraga  YOB: 1960    Primary Care Physician: ANICETO Amor CNP    CHIEF COMPLAINT:   Chief Complaint   Patient presents with   [address] [place] [facility] [place]2021 [facility].  Other     Heart fluttering    Coronary Artery Disease    Congestive Heart Failure     HISTORY OF PRESENT ILLNESS: Austen Lizarraga is a 64 y.o. male with a PMH significant for significant for ICM s/p CABG (09/2020 Antonina Lizarraga), HTN, HLD, morbid obesity, OANH, and COPD (former smoker, quit 8 months ago). In 4/2020 he presented to [facility] with unstable angina, a positive stress test, intermittent 2:1 conduction, and CHB. According to patient he initially presented to the medical system after he was diagnosed with influenza pneumonia for which he required ventilation support for approximately 6 days and a prolonged hospital course of approximate 11 days. He never quite recovered and continued to have issues with shortness of breath. Eventually this led him down the path of newly diagnosed ischemic cardiomyopathy. He subsequently underwent a CABG in September 2020 with Dr. Maylin Li. Postoperatively he felt much better for approximately a month however he did have new onset dizziness that has been chronic and ongoing. He has had some issues bouncing off of walls intermittently because of his dizziness. Patient was seen by EP originally after being transferred to [facility] from [facility] because of a positive stress test on 4/8/2021. He was transferred to [facility] on 4/14/2021 where he underwent a LHC which demonstrated 2 out of 3 bypass grafts are occluded, the LIMA to LAD graft is patent. High risk PCI of the left main into diagonal. After discharge from hospital, patient wore a cardiac event monitor from 4/20/2021 to 5/2/2021 which demonstrated predominately SR with an average HR of 71 ().  PAC burden 0.63%, PVC burden 0.01%, longest pause lasting 3.5 seconds. Patient presents today, 7/1/2021, for evaluation of nocturnal block on cardiac event monitor. Today, 7/1/2021, ECG demonstrates SR (74). He reports that he experiences episodes at least 2 times per day where he feels his heart flutter and this makes him become short of breath and dizzy at times. He reports that overall, he feels better. 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 He reports that he is taking his medications as prescribed and tolerates them well. Patient denies current edema, chest pain, or syncope. Past Medical History:   has a past medical history of Acute diverticulitis, Acute kidney injury ([address] Acute on chronic respiratory failure with hypoxemia ([address] Acute respiratory failure ([address] Acute respiratory failure with hypoxia ([address] Anxiety, Aortic valve calcification, CAD in native artery, Chronic obstructive pulmonary disease (HCC), Chronic systolic congestive heart failure (HCC), Class 3 severe obesity with body mass index (BMI) of 45.0 to 49.9 in adult [facility]), Coronary artery calcification, Coronary artery disease involving native heart with angina pectoris ([address] Crush injury of right foot, DDD (degenerative disc disease), lumbar, Depression, Diverticulitis of colon, Erectile dysfunction, Fatigue, HNP (herniated nucleus pulposus), lumbar, Hyperglycemia, Hyperlipidemia, Hypersomnia, Hypertension, Insomnia, Ischemic cardiomyopathy, Kidney stone, Lumbar radiculopathy, Lumbar spine pain, LVH (left ventricular hypertrophy), Mobitz type I Wenckebach atrioventricular block, Moderate protein-calorie malnutrition ([address] Observed sleep apnea, OANH (obstructive sleep apnea), Pneumonia, primary atypical, Reactive depression, S/P three vessel coronary artery bypass, Spondylosis without myelopathy or radiculopathy, lumbar region, Tobacco abuse, Tobacco use, and Wears dentures.     Past Surgical History:   has a past surgical history that includes Cholecystectomy; Cystoscopy (03/16/2011); Pain management procedure (Right, 12/24/2019); Pain management procedure (Right, 1/7/2020); Coronary artery bypass graft (N/A, 9/25/2020); Finger amputation (Right, 2/2/2021); and Coronary angioplasty with stent (03/2021). Allergies:  Dilaudid [hydromorphone hcl] and Codeine    Social History:   reports that he quit smoking about a year ago. His smoking use included cigarettes. He has a 70.00 pack-year smoking history. He has never used smokeless tobacco. He reports previous drug use. Drugs: Marijuana and Cocaine. He reports that he does not drink alcohol. Family History: family history includes Heart Disease in his mother; High Blood Pressure in his sister; No Known Problems in his sister; Other in his mother. Home Medications:    Prior to Admission medications    Medication Sig Start Date End Date Taking?  Authorizing Provider   benzonatate (TESSALON) 200 MG capsule TAKE ONE CAPSULE BY MOUTH THREE TIMES A DAY AS NEEDED FOR COUGH 6/21/21  Yes ANICETO Galvan CNP   losartan (COZAAR) 25 MG tablet Take 0.5 tablets by mouth daily 5/17/21  Yes Claudia Valenzuela MD   OXYGEN Inhale 2 L into the lungs as needed    Yes Historical Provider, MD   meclizine (ANTIVERT) 25 MG tablet Take 1 tablet by mouth 3 times daily as needed for Dizziness 5/5/21  Yes ANICETO Galvan CNP   nitroGLYCERIN (NITROSTAT) 0.4 MG SL tablet Place 1 tablet under the tongue every 5 minutes as needed for Chest pain 4/29/21  Yes Claudia Valenzuela MD   furosemide (LASIX) 80 MG tablet Take 0.5 tablets by mouth daily 4/20/21 4/20/22 Yes ANICETO Taylor CNP   metoprolol succinate (TOPROL XL) 25 MG extended release tablet Take 1 tablet by mouth daily 4/20/21  Yes ANICETO Taylor CNP   ipratropium (ATROVENT) 0.02 % nebulizer solution Take 2.5 mLs by nebulization 3 times daily 3/2/21  Yes ANICETO Galvan CNP   traZODone (DESYREL) 100 MG tablet Take 1 tablet by mouth nightly  Patient taking differently: Take 100 mg by mouth 2 times daily Am pm 3/1/21  Yes ANICETO Levine CNP   sertraline (ZOLOFT) 100 MG tablet Take 1 tablet by mouth daily 3/1/21  Yes ANICETO Levine CNP   clopidogrel (PLAVIX) 75 MG tablet Take 1 tablet by mouth daily 1/13/21  Yes Nasra Donahue MD   atorvastatin (LIPITOR) 80 MG tablet Take 1 tablet by mouth nightly  Patient taking differently: Take 40 mg by mouth nightly  10/28/20  Yes Nasra Donahue MD   aspirin 81 MG EC tablet Take 1 tablet by mouth daily 9/30/20  Yes [place] ANICETO Gunn CNP   albuterol (PROVENTIL) (2.5 MG/3ML) 0.083% nebulizer solution Take 3 mLs by nebulization every 6 hours as needed for Wheezing or Shortness of Breath 9/25/19  Yes ANICETO Levine CNP   albuterol sulfate HFA (PROVENTIL HFA) 108 (90 Base) MCG/ACT inhaler Inhale 2 puffs into the lungs every 6 hours as needed for Wheezing 5/17/19  Yes Yousif Johns MD       REVIEW OF SYSTEMS:    All 14-point review of systems are completed and  pertinent positives are mentioned in the history of present illness. Other  systems are reviewed and are negative. Physical Examination:    /70   Pulse 74   Ht 5' 4\" (1.626 m)   Wt 292 lb 8 oz (132.7 kg)   SpO2 98%   BMI 50.21 kg/m²      Constitutional and General Appearance:    alert, cooperative, no distress and appears stated age  HEENT:    PERRLA, no cervical lymphadenopathy. No masses palpable. Normal oral  mucosa  Respiratory:  · Normal excursion and expansion without use of accessory muscles  · Resp Auscultation: Normal breath sounds without dullness or wheezing  Cardiovascular:  · The apical impulse is not displaced  · RRR S1S2 w/o M/G/R  Abdomen:  · No masses or tenderness  · Bowel sounds present  Extremities:  ·  No Cyanosis or Clubbing  ·  Lower extremity edema: No  · Skin: Warm and dry  Neurological:  · Alert and oriented.   · Moves all extremities well  · No abnormalities of mood, affect, memory, mentation, or behavior are noted    DATA:    ECG 7/1/21: Personally reviewed. [facility] 4/14/2021  CONCLUSIONS:     Successful rotational atherectomy and PCI of D1 with single   drug-eluting stent   Successful rotational atherectomy and PCI of left main/LAD with   single drug-eluting stent   Medical therapy for LCx , this lesion does not appear to be   amenable for PCI     [facility] 4/14/2021  CONCLUSIONS:      2 out of 3 bypass grafts are occluded, the LIMA to LAD graft is patent  We will consult with CT surgery regarding options for revascularization which potentially may include redo CABG versus high risk PCI of the left main into diagonal  Will order CTA abd/bilat LE w/ runoff, in case cardiac support devices are needed    Stress test 4/8/2021  Summary Large area, reversible, moderate to severe intensity perfusion defect  involving the basal to apical anterolateral, mid to apical anterior and  apex. Mixed ischemia/scar of the inferolateral territory. Decreased wall  motion and myocardial thickening of the above segments. Post-stress LVEF  visually estimated 45-50%. Overall findings represent a high risk scan. Limited Echo 3/12/2021  Summary   Limited echo for left ventricle systolic function. Definity is used for   myocardial border enhancement. LV systolic function is mildly reduced with a visually estimated EF=45-50%. The left ventricle is normal in size with normal wall thickness. More prominent Inferior HK on certain views. Indeterminate diastolic function. IMPRESSION:    1. Bradycardia   04/14/2021  Patient is a pleasant 65-year-old male with a medical history for ischemic cardiomyopathy status post CABG in September 2020 who presents from home with unstable angina.   His exercise stress test showed that he had some chronotropic incompetence however he was able to increase his heart rate without heart block which is helpful from a conduction standpoint given his history of 2 1 conduction, Mobitz type I block, and complete heart block. Most of his complete heart block and been pronounced bradycardic events were nocturnal and occurred around 4 - 5 AM in the morning. Patient does have a history of obstructive sleep apnea as I suspect this is related to his obstructive sleep apnea. I do not see any current pacing indications. His left ventricular function is greater than 35% and he scheduled to undergo a left heart catheterization because of suspected ongoing ischemia. Again I see no indication for ICD.     Because of his now known bradycardic events and history of dizziness and ischemic cardiomyopathy I think it be reasonable patient wear a cardiac monitor for 4 weeks post discharge. 7/1/2021  Patient was well. He wore a cardiac monitor which showed nocturnal 2-1 block. He had some dizziness however was in normal sinus rhythm at that time. His nonsustained ventricular tachycardia on his monitor as well. Patient is very concerned about his dizziness. As he has had some nonsustained ventricular tachycardia and no bradycardia with 2-1 block he would like to move forward with a loop monitor for long-term monitoring given the high risk job he is in. We will arrange for a loop monitor and follow-up afterwards. - Plan for ILR implantation.  - Continue GDMT (metoprolol). - Follow up after ILR implantation. RECOMMENDATIONS:  1. Discussed risks and benefits of ILR placement given your ongoing symptoms. Patient would like to proceed with this option. 2. Continue medications as prescribed. 3. Encourage activity as tolerated. 4. Follow up after ILR placement. QUALITY MEASURES  1. Tobacco Cessation Counseling: NA  2. Retake of BP if >140/90:   NA  3. Documentation to PCP/referring for new patient:  Sent to PCP at close of office visit  4. CAD patient on anti-platelet: Yes  5. CAD patient on STATIN therapy:  Yes  6.  Patient with CHF and aFib on anticoagulation:  NA     All questions and concerns were addressed to the patient/family. Alternatives to my treatment were discussed. Dr. Pierre Sewell MD  Electrophysiology  [facility]. [address] [facility]. [address]. [place]  Phone: [phone]  Fax: [phone]     NOTE: This report was transcribed using voice recognition software. Every effort was made to ensure accuracy, however, inadvertent computerized transcription errors may be present. Markell Jackson RN, am scribing for and in the presence of Dr. Jerry Jimenez. 07/01/21 8:34 AM   Amanda Glez RN    The scribe's documentation has been prepared under my direction and personally reviewed by me in its entirety. I confirm that the note above accurately reflects all work, physical examination, the discussion of treatments and procedures, and medical decision making performed by me. Obie Guadalupe MD personally performed the services described in this documentation as scribed by nurse in my presence, and is both accurate and complete.     Electronically signed by Sarah Palafox MD on 7/1/2021 at 8:53 AM

## 2021-07-01 NOTE — TELEPHONE ENCOUNTER
Patient seen in office 7/1/2021. ILR implant discussed and agreed upon by [id number] and patient. Medications not discussed. Patient will need covid testing prior. Thank you!

## 2021-07-09 ENCOUNTER — TELEPHONE (OUTPATIENT)
Dept: CARDIOLOGY CLINIC | Age: 61
End: 2021-07-09

## 2021-07-09 NOTE — TELEPHONE ENCOUNTER
Spoke with patient. Patient is scheduled with Dr. Skylar Livingston for Loop Implant with [employer] on 7/23/21 at 12pm [facility], arrival time of 10:30am to the Cath Lab. Please have patient arrive to the main entrance of [facility] and check in with the registration desk. Please call patient regarding medication instructions. Remind patient to be NPO after midnight (8 hours prior). Do not apply lotions/creams on skin the day of procedure. COVID testing - Rapid if needed.

## 2021-07-19 ENCOUNTER — OFFICE VISIT (OUTPATIENT)
Dept: PULMONOLOGY | Age: 61
End: 2021-07-19
Payer: COMMERCIAL

## 2021-07-19 VITALS
BODY MASS INDEX: 50.57 KG/M2 | OXYGEN SATURATION: 95 % | HEART RATE: 74 BPM | WEIGHT: 296.2 LBS | DIASTOLIC BLOOD PRESSURE: 76 MMHG | SYSTOLIC BLOOD PRESSURE: 130 MMHG | HEIGHT: 64 IN | TEMPERATURE: 97 F | RESPIRATION RATE: 16 BRPM

## 2021-07-19 DIAGNOSIS — G47.10 HYPERSOMNIA: ICD-10-CM

## 2021-07-19 DIAGNOSIS — G47.30 OBSERVED SLEEP APNEA: Primary | ICD-10-CM

## 2021-07-19 DIAGNOSIS — R00.1 BRADYCARDIA: ICD-10-CM

## 2021-07-19 DIAGNOSIS — R06.09 DYSPNEA ON EXERTION: ICD-10-CM

## 2021-07-19 DIAGNOSIS — J44.9 CHRONIC OBSTRUCTIVE PULMONARY DISEASE, UNSPECIFIED COPD TYPE (HCC): ICD-10-CM

## 2021-07-19 PROCEDURE — 99214 OFFICE O/P EST MOD 30 MIN: CPT | Performed by: INTERNAL MEDICINE

## 2021-07-19 RX ORDER — FLUTICASONE FUROATE, UMECLIDINIUM BROMIDE AND VILANTEROL TRIFENATATE 100; 62.5; 25 UG/1; UG/1; UG/1
1 POWDER RESPIRATORY (INHALATION) DAILY
Qty: 60 EACH | Refills: 3 | Status: SHIPPED
Start: 2021-07-19 | End: 2021-09-29 | Stop reason: CLARIF

## 2021-07-19 ASSESSMENT — SLEEP AND FATIGUE QUESTIONNAIRES
NECK CIRCUMFERENCE (INCHES): 19
HOW LIKELY ARE YOU TO NOD OFF OR FALL ASLEEP WHILE SITTING AND READING: 0
HOW LIKELY ARE YOU TO NOD OFF OR FALL ASLEEP WHILE SITTING QUIETLY AFTER LUNCH WITHOUT ALCOHOL: 3
ESS TOTAL SCORE: 12
HOW LIKELY ARE YOU TO NOD OFF OR FALL ASLEEP WHILE WATCHING TV: 3
HOW LIKELY ARE YOU TO NOD OFF OR FALL ASLEEP IN A CAR, WHILE STOPPED FOR A FEW MINUTES IN TRAFFIC: 0
HOW LIKELY ARE YOU TO NOD OFF OR FALL ASLEEP WHILE SITTING AND TALKING TO SOMEONE: 0
HOW LIKELY ARE YOU TO NOD OFF OR FALL ASLEEP WHILE SITTING INACTIVE IN A PUBLIC PLACE: 0
HOW LIKELY ARE YOU TO NOD OFF OR FALL ASLEEP WHILE LYING DOWN TO REST IN THE AFTERNOON WHEN CIRCUMSTANCES PERMIT: 3
HOW LIKELY ARE YOU TO NOD OFF OR FALL ASLEEP WHEN YOU ARE A PASSENGER IN A CAR FOR AN HOUR WITHOUT A BREAK: 3

## 2021-07-19 NOTE — PATIENT INSTRUCTIONS
Remember to bring all pulmonary medications to your next appointment with the office. Please keep all of your future appointments scheduled by [facility] Pulmonary office. Out of respect for other patients and providers, you may be asked to reschedule your appointment if you arrive later than your scheduled appointment time. Appointments cancelled less than 24hrs in advance will be considered a no show. Patients with three missed appointments within 1 year or four missed appointments within 2 years can be dismissed from the practice. Sleep Hygiene. .. Tips for better sleep. .. Avoid naps. This will ensure you are sleepy at bedtime. If you have to take a nap, sleep less than 1 hour, before 3 pm.  Sleep only when sleepy; this reduces the time you are awake in bed. Regular exercise is recommended to help you deepen your sleep, but not within 4-6 hours of your bedtime. Timing of exercise is important, aim to exercise early in the morning or early afternoon. A light snack may help you fall asleep. Warm milk and foods high in the amino acid tryptophan, such as bananas, may help you to sleep  Be sure to avoid heavy, spicy or sugary foods 4-6 hours before bedtime and avoid at snack time. Stay away from stimulants such as caffeine and nicotine for at least 4-6 hours before bed. Stimulants can interfere with your ability to fall asleep. Caffeine is found in tea, cola, coffee, cocoa and chocolate and is best avoided at bedtime. Nicotine is found in tobacco products. Avoid alcohol 4-6 hours before bedtime. Alcohol has an immediate sleep-inducing effect, after a few hours when alcohol levels fall there is a stimulant or wake-up effect and will cause fragmented sleep. Sleep rituals are important. Give your body clues it is time to slow down and sleep. Examples include; yoga, deep breathing, listen to relaxing music, a hot bath or a few minutes of reading.   Have a fixed bedtime and awakening time, Even on weekends! You will feel better keeping a regular sleep cycle, even if you are retired or not working. Get into your favorite sleep position. If not asleep in 30 minutes, get up and do something boring until you feel sleepy. Remember not to expose yourself to bright lights such as TV, phone or tablet screens. Only use your bed for sleeping. Do not use your bed as an office, workroom or recreation room. Use comfortable bedding. Uncomfortable bedding can prevent good sleep. Ensure your bedroom is quiet and comfortable. A cooler room along with enough blankets to stay warm is recommended. If your room is too noisy, try a white noise machine. If too bright, try black out shades or an eye mask. Dont take worries to bed. Leave worries about work, school etc. behind you when you go to bed. Some people find it helpful to assign a worry period in the evening or late afternoon to write down your worries and get them out of your system. Your home sleep test is scheduled to be picked up at the Sleep center located at [address] [place] [address] liudmila on  at  . Address to Sleep Center: [facility] at [address] [place] [address], [address] [place] [address]            Phone: [phone] Fax: [phone]    If you should need to cancel or reschedule your appointment, please call the Sleep Center at [phone] as soon as possible. We ask that you please phone the [facility] Patient Pre-Services ([phone]) at least 3-5 days prior to your sleep study to pre-register. Failing to pre-register may ultimately cause your insurance to not pay for this procedure. Please refrain from or reduce the use of caffeine and/or alcohol prior to your sleep study and avoid napping the day of your study as these will affect the accuracy of your test results.

## 2021-07-19 NOTE — PROGRESS NOTES
[facility]                                                                CHIEF COMPLAINT: Follow-up OANH      HPI:   Patient has not had his PSG - lee snot think he has OANH   Feels breathing is worse past 2 weeks  Cough with lime green sputum  No hemoptysis   Uses Albuterol BID with some help     From prior visit:   64years old with chest pain being followed by Dr. Elana Seo here for sleep apnea evaluation and shortness of breath. Wakes up at night choking and gasping for air for 3.5 years, severe at times. Associated with observed sleep apnea and hypersomnia. No snoring. Better when he sits up and worse when laying down. No restorative sleep. + dry mouth upon awakening. Patient is complaining of daytime sleepiness, fatigue and tiredness at times during the day. Bedtime 8-10 pm and rise time is 5:30 am. Sleep onset 60 minutes. Watches TV in bedroom. + morning headache. 1 nocturia. Wakes up 4-5 times at night. 2-3 nap/weekf or 30-45 min. No car wrecks or near wrecks because of the sleepiness. No nodding off while driving. LEE for 3.5 years, mild. Dyspnea worse with exertion and better with resting. Albuterol helps his breathing. Uses 2-3 times/day. Associated with cough and wheezes. Able to walk mail box and back.  Smoker 1 ppd for the past 45 years- smokes now 1.5 ppd     Old records reviewed by me and summarizedc5        Past Medical History:   Diagnosis Date    Acute diverticulitis 04/10/2021    Acute kidney injury ([address] 04/08/2021    Acute on chronic respiratory failure with hypoxemia (HCC)     Acute respiratory failure ([address] 08/19/2020    Acute respiratory failure with hypoxia ([address]     Anxiety 01/06/2020    Aortic valve calcification 09/2020    seen on lung CT    CAD in native artery 04/14/2021    Chronic obstructive pulmonary disease ([address] 09/03/2019    PFT done 9/03/19    Chronic systolic congestive heart failure ([address] 04/10/2021    Class 3 severe obesity with body mass index (BMI) of 45.0 to 49.9 in adult [facility])     Coronary artery calcification 09/2020    seen on lung ct    Coronary artery disease involving native heart with angina pectoris ([facility] 09/22/2020    Crush injury of right foot 07/23/2015    DDD (degenerative disc disease), lumbar 01/06/2020    Depression 01/06/2020    Diverticulitis of colon 04/10/2021    Erectile dysfunction 01/06/2020    Fatigue 01/06/2020    HNP (herniated nucleus pulposus), lumbar 01/06/2020    Hyperglycemia 01/06/2020    Hyperlipidemia     Hypersomnia 01/06/2020    Hypertension     Insomnia     Ischemic cardiomyopathy     Kidney stone     Lumbar radiculopathy 01/06/2020    Lumbar spine pain 01/06/2020    LVH (left ventricular hypertrophy)     seen on echo 8/2020, moderate    Mobitz type I Wenckebach atrioventricular block 04/10/2021    Moderate protein-calorie malnutrition ([facility] 03/17/2020    Observed sleep apnea 01/06/2020    OANH (obstructive sleep apnea) 01/06/2020    no C-pap is getting tested    Pneumonia, primary atypical     Reactive depression 01/06/2020    S/P three vessel coronary artery bypass 10/26/2020    Spondylosis without myelopathy or radiculopathy, lumbar region 01/06/2020    Tobacco abuse 01/06/2020    Tobacco use 01/06/2020    Wears dentures     full set       Past Surgical History:        Procedure Laterality Date    CHOLECYSTECTOMY      CORONARY ANGIOPLASTY WITH STENT PLACEMENT  03/2021    CORONARY ARTERY BYPASS GRAFT N/A 9/25/2020    CORONARY ARTERY BYPASS GRAFTING X3, INTERNAL MAMMARY ARTERY, SAPHENOUS VEIN GRAFT, ON PUMP, STERNAL PLATING, 5 LEVEL BILATERAL INTERCOSTAL NERVE BLOCK, PLATELET GEL APPLICATION performed by Nicolasa Ospina MD at [address] [facility]  03/16/2011    cystoscopy left ureteroscopy, left retrograde, double J stent placement    FINGER AMPUTATION Right 2/2/2021    RIGHT MIDDLE FINGER IRRIGATION AND DEBRIDEMENT WITH REVISION AMPUTATION AND BONE SHORTENING, POSSIBLE NAIL ABLATION [facility]  Right 12/24/2019    RIGHT LUMBAR FIVE SACRAL ONE TRANSFORAMINAL EPIDURAL STEROID INJECTION SITE CONFIRMED BY FLUOROSCOPY performed by Dar Mckee MD at [facility] Right 1/7/2020    RIGHT LUMBAR FOUR AND LUMBAR FIVE TRANSFORAMINAL EPIDURAL STEROID INJECTION SITE CONFIRMED BY FLUOROSCOPY performed by Dar Mckee MD at [facility]       Allergies:  is allergic to dilaudid [hydromorphone hcl] and codeine. Social History:    TOBACCO:   reports that he quit smoking about 12 months ago. His smoking use included cigarettes. He has a 70.00 pack-year smoking history. He has never used smokeless tobacco.  ETOH:   reports no history of alcohol use.       Family History:       Problem Relation Age of Onset    Heart Disease Mother     Other Mother         copd    High Blood Pressure Sister     No Known Problems Sister        Current Medications:    Current Outpatient Medications:     benzonatate (TESSALON) 200 MG capsule, TAKE ONE CAPSULE BY MOUTH THREE TIMES A DAY AS NEEDED FOR COUGH, Disp: 30 capsule, Rfl: 0    losartan (COZAAR) 25 MG tablet, Take 0.5 tablets by mouth daily, Disp: 30 tablet, Rfl: 5    OXYGEN, Inhale 2 L into the lungs as needed , Disp: , Rfl:     meclizine (ANTIVERT) 25 MG tablet, Take 1 tablet by mouth 3 times daily as needed for Dizziness, Disp: 30 tablet, Rfl: 0    nitroGLYCERIN (NITROSTAT) 0.4 MG SL tablet, Place 1 tablet under the tongue every 5 minutes as needed for Chest pain, Disp: 25 tablet, Rfl: 3    furosemide (LASIX) 80 MG tablet, Take 0.5 tablets by mouth daily, Disp: 45 tablet, Rfl: 3    metoprolol succinate (TOPROL XL) 25 MG extended release tablet, Take 1 tablet by mouth daily, Disp: 30 tablet, Rfl: 11    ipratropium (ATROVENT) 0.02 % nebulizer solution, Take 2.5 mLs by nebulization 3 times daily, Disp: 225 mL, Rfl: 3    traZODone (DESYREL) 100 MG tablet, Take 1 tablet by mouth nightly (Patient taking differently: Take 100 mg by mouth 2 times daily Am pm), Disp: 30 tablet, Rfl: 5    sertraline (ZOLOFT) 100 MG tablet, Take 1 tablet by mouth daily, Disp: 30 tablet, Rfl: 3    clopidogrel (PLAVIX) 75 MG tablet, Take 1 tablet by mouth daily, Disp: 90 tablet, Rfl: 3    atorvastatin (LIPITOR) 80 MG tablet, Take 1 tablet by mouth nightly (Patient taking differently: Take 40 mg by mouth nightly ), Disp: 90 tablet, Rfl: 3    aspirin 81 MG EC tablet, Take 1 tablet by mouth daily, Disp: 30 tablet, Rfl: 0    albuterol (PROVENTIL) (2.5 MG/3ML) 0.083% nebulizer solution, Take 3 mLs by nebulization every 6 hours as needed for Wheezing or Shortness of Breath, Disp: 25 vial, Rfl: 1    albuterol sulfate HFA (PROVENTIL HFA) 108 (90 Base) MCG/ACT inhaler, Inhale 2 puffs into the lungs every 6 hours as needed for Wheezing, Disp: 1 Inhaler, Rfl: 0      Objective:   PHYSICAL EXAM:    /76   Pulse 74   Temp 97 °F (36.1 °C)   Resp 16   Ht 5' 4\" (1.626 m)   Wt 296 lb 3.2 oz (134.4 kg)   SpO2 95% Comment: With RA  BMI 50.84 kg/m²  on  RA   Gen: No distress. Eyes: PERRL. No sclera icterus. No conjunctival injection. ENT: No discharge. Pharynx clear. Mallampati class IV. Neck: Trachea midline. No obvious mass. Resp: No accessory muscle use. Few crackles. No wheezes. No rhonchi. No dullness on percussion. Good air entry. CV: Regular rate. Regular rhythm. No murmur or rub. 1-2 + LE edema. GI: Non-tender. Non-distended. No hernia. Skin: Warm and dry. No nodule on exposed extremities. Lymph: No cervical LAD. No supraclavicular LAD. M/S: No cyanosis. No joint deformity. No clubbing. Neuro: Awake. Alert. Moves all four extremities. Psych: Oriented x 3. No anxiety.          DATA reviewed by me:   PFTs 09/30/2020 FVC 2.61 (68%) FEV1 1.96 (66%) FEV1/FVC 75% TLC 6.36 (105%) DLCO 22.12 (81%) 6MW F Res Ex    CT chest 9/18/2020 imaging reviewed by me and showed  No PE or aortic dissection  No adenopathy  No infiltrates or effusion    CTPA 4/21/2021  images reviewed by me and showed:   Pulmonary Arteries: Motion artifact degrades image quality. There is no  acute pulmonary thromboembolus. Mediastinum: Status post median sternotomy and CABG. There are no enlarged  thoracic lymph nodes. Lungs/pleura: Secretions layer within the mid trachea. There is no  pneumothorax. There is a trace left pleural effusion bibasilar atelectasis. No change in the multiple 3 mm solid bilateral upper lobe pulmonary nodules,  no follow-up imaging is recommended. Upper Abdomen: Status post cholecystectomy. Soft Tissues/Bones: Degenerative changes involve the thoracic spine. There  is an old healed left rib fracture. Moderate subcutaneous inflammatory stranding is present throughout the left  upper chest wall and axilla with mild associated skin thickening. There is  no drainable fluid collection. LE doppler 4/29/21 negative DVT       TSH 0.9    Assessment:       · Hypersomnia and observed sleep apnea  · Probable COPD  · Dyspnea on exertion-multifactorial due to obesity, deconditioning, probable underlying COPD and cardiac  · Bradycardia, AV block, ischemic cardiomyopathy post CABG September 2020 followed by cardiology  · 45 pack year smoking- Quit March 2020         Plan:       PFTs and 6MW   Continue Albuterol 2 puffs Q4-6 hrs PRN  Trial of Trelegy Ellipta Oral inhalation: Dry powder inhaler: One inhalation (fluticasone furoate 100 mcg/umeclidinium 62.5 mcg/vilanterol 25 mcg) once daily (maximum dose: 1 inhalation/day)  Advised to get his Pneumococcal vaccine and influenza vaccine this year   Declined Covid vaccine. Risks, benefits and side effects were discussed with patient. Advised to continue with smoking cessation  HST evaluate for sleep related breathing disorder- declined in lab.  Complications of OANH if not treated

## 2021-07-21 DIAGNOSIS — F41.9 ANXIETY: ICD-10-CM

## 2021-07-21 DIAGNOSIS — F32.9 REACTIVE DEPRESSION: ICD-10-CM

## 2021-07-22 RX ORDER — SERTRALINE HYDROCHLORIDE 100 MG/1
TABLET, FILM COATED ORAL
Qty: 30 TABLET | Refills: 2 | Status: SHIPPED | OUTPATIENT
Start: 2021-07-22 | End: 2021-10-18

## 2021-07-23 ENCOUNTER — HOSPITAL ENCOUNTER (OUTPATIENT)
Dept: CARDIAC CATH/INVASIVE PROCEDURES | Age: 61
Discharge: HOME | End: 2021-07-23
Attending: INTERNAL MEDICINE | Admitting: INTERNAL MEDICINE
Payer: COMMERCIAL

## 2021-07-23 ENCOUNTER — HOSPITAL ENCOUNTER (OUTPATIENT)
Age: 61
Discharge: HOME | End: 2021-07-23
Attending: INTERNAL MEDICINE
Payer: COMMERCIAL

## 2021-07-23 ENCOUNTER — HOSPITAL ENCOUNTER (OUTPATIENT)
Age: 61
Setting detail: SPECIMEN
Discharge: HOME | End: 2021-07-23
Payer: COMMERCIAL

## 2021-07-23 VITALS — BODY MASS INDEX: 49.51 KG/M2 | WEIGHT: 290 LBS | HEIGHT: 64 IN

## 2021-07-23 DIAGNOSIS — Z45.09 ENCOUNTER FOR LOOP RECORDER CHECK: Primary | ICD-10-CM

## 2021-07-23 PROCEDURE — 33285 INSJ SUBQ CAR RHYTHM MNTR: CPT | Performed by: INTERNAL MEDICINE

## 2021-07-23 PROCEDURE — U0003 INFECTIOUS AGENT DETECTION BY NUCLEIC ACID (DNA OR RNA); SEVERE ACUTE RESPIRATORY SYNDROME CORONAVIRUS 2 (SARS-COV-2) (CORONAVIRUS DISEASE [COVID-19]), AMPLIFIED PROBE TECHNIQUE, MAKING USE OF HIGH THROUGHPUT TECHNOLOGIES AS DESCRIBED BY CMS-2020-01-R: HCPCS

## 2021-07-23 PROCEDURE — U0005 INFEC AGEN DETEC AMPLI PROBE: HCPCS

## 2021-07-23 PROCEDURE — 2500000003 HC RX 250 WO HCPCS

## 2021-07-23 PROCEDURE — 33285 INSJ SUBQ CAR RHYTHM MNTR: CPT

## 2021-07-23 RX ORDER — SODIUM CHLORIDE 0.9 % (FLUSH) 0.9 %
5-40 SYRINGE (ML) INJECTION PRN
Status: DISCONTINUED | OUTPATIENT
Start: 2021-07-23 | End: 2021-07-23 | Stop reason: HOSPADM

## 2021-07-23 RX ORDER — SODIUM CHLORIDE 9 MG/ML
25 INJECTION, SOLUTION INTRAVENOUS PRN
Status: DISCONTINUED | OUTPATIENT
Start: 2021-07-23 | End: 2021-07-23 | Stop reason: HOSPADM

## 2021-07-23 RX ORDER — SODIUM CHLORIDE 0.9 % (FLUSH) 0.9 %
5-40 SYRINGE (ML) INJECTION EVERY 12 HOURS SCHEDULED
Status: DISCONTINUED | OUTPATIENT
Start: 2021-07-23 | End: 2021-07-23 | Stop reason: HOSPADM

## 2021-07-24 LAB — SARS-COV-2, PCR: NOT DETECTED

## 2021-07-26 ENCOUNTER — HOSPITAL ENCOUNTER (OUTPATIENT)
Dept: PULMONOLOGY | Age: 61
Discharge: HOME | End: 2021-07-26
Payer: COMMERCIAL

## 2021-07-26 ENCOUNTER — NURSE ONLY (OUTPATIENT)
Dept: CARDIOLOGY CLINIC | Age: 61
End: 2021-07-26

## 2021-07-26 ENCOUNTER — TELEPHONE (OUTPATIENT)
Dept: FAMILY MEDICINE CLINIC | Age: 61
End: 2021-07-26

## 2021-07-26 VITALS — OXYGEN SATURATION: 96 %

## 2021-07-26 DIAGNOSIS — Z45.09 ENCOUNTER FOR LOOP RECORDER CHECK: ICD-10-CM

## 2021-07-26 LAB
DLCO %PRED: 65 %
DLCO PRED: NORMAL
DLCO/VA %PRED: NORMAL
DLCO/VA PRED: NORMAL
DLCO/VA: NORMAL
DLCO: NORMAL
EXPIRATORY TIME-POST: NORMAL
EXPIRATORY TIME: NORMAL
FEF 25-75% %CHNG: NORMAL
FEF 25-75% %PRED-POST: NORMAL
FEF 25-75% %PRED-PRE: NORMAL
FEF 25-75% PRED: NORMAL
FEF 25-75%-POST: NORMAL
FEF 25-75%-PRE: NORMAL
FEV1 %PRED-POST: 64 %
FEV1 %PRED-PRE: 57 %
FEV1 PRED: NORMAL
FEV1-POST: NORMAL
FEV1-PRE: NORMAL
FEV1/FVC %PRED-POST: NORMAL
FEV1/FVC %PRED-PRE: NORMAL
FEV1/FVC PRED: NORMAL
FEV1/FVC-POST: 62 %
FEV1/FVC-PRE: 61 %
FVC %PRED-POST: NORMAL
FVC %PRED-PRE: NORMAL
FVC PRED: NORMAL
FVC-POST: NORMAL
FVC-PRE: NORMAL
GAW %PRED: NORMAL
GAW PRED: NORMAL
GAW: NORMAL
IC %PRED: NORMAL
IC PRED: NORMAL
IC: NORMAL
MEP: NORMAL
MIP: NORMAL
MVV %PRED-PRE: NORMAL
MVV PRED: NORMAL
MVV-PRE: NORMAL
PEF %PRED-POST: NORMAL
PEF %PRED-PRE: NORMAL
PEF PRED: NORMAL
PEF%CHNG: NORMAL
PEF-POST: NORMAL
PEF-PRE: NORMAL
RAW %PRED: NORMAL
RAW PRED: NORMAL
RAW: NORMAL
RV %PRED: NORMAL
RV PRED: NORMAL
RV: NORMAL
SVC %PRED: NORMAL
SVC PRED: NORMAL
SVC: NORMAL
TLC %PRED: 87 %
TLC PRED: NORMAL
TLC: NORMAL
VA %PRED: NORMAL
VA PRED: NORMAL
VA: NORMAL
VTG %PRED: NORMAL
VTG PRED: NORMAL
VTG: NORMAL

## 2021-07-26 PROCEDURE — 6370000000 HC RX 637 (ALT 250 FOR IP): Performed by: INTERNAL MEDICINE

## 2021-07-26 PROCEDURE — 94640 AIRWAY INHALATION TREATMENT: CPT

## 2021-07-26 PROCEDURE — 94726 PLETHYSMOGRAPHY LUNG VOLUMES: CPT

## 2021-07-26 PROCEDURE — 94729 DIFFUSING CAPACITY: CPT

## 2021-07-26 PROCEDURE — 94760 N-INVAS EAR/PLS OXIMETRY 1: CPT

## 2021-07-26 PROCEDURE — 94060 EVALUATION OF WHEEZING: CPT

## 2021-07-26 RX ORDER — ALBUTEROL SULFATE 90 UG/1
4 AEROSOL, METERED RESPIRATORY (INHALATION) ONCE
Status: COMPLETED | OUTPATIENT
Start: 2021-07-26 | End: 2021-07-26

## 2021-07-26 RX ADMIN — Medication 4 PUFF: at 14:47

## 2021-07-26 ASSESSMENT — PULMONARY FUNCTION TESTS
FEV1/FVC_POST: 62
FEV1/FVC_PRE: 61
FEV1_PERCENT_PREDICTED_POST: 64
FEV1_PERCENT_PREDICTED_PRE: 57

## 2021-07-26 NOTE — PROGRESS NOTES
Pt had ILR implanted 7/23/21 for bradycardia. PMH significant for significant for ICM s/p CABG (09/2020 Angi Sumner), HTN, HLD, morbid obesity, OANH, and COPD (former smoker, quit 8 months ago). In 4/2020 he presented to [facility] with unstable angina, a positive stress test, intermittent 2:1 conduction, and CHB. Alerts received for pause/yoon events. Cardiac compass shows day HR 70-80 bpm and night HR not registered (<60 bpm). Between 3210-7740 AVB noted w/ rates as low as 30 bpm.   No symptom activated events.

## 2021-07-26 NOTE — TELEPHONE ENCOUNTER
[facility] Transitions Initial Follow Up Call    Outreach made within 2 business days of discharge: Yes    Patient: Manfred Yang Patient : 1960   MRN: [id number]  Reason for Admission: There are no discharge diagnoses documented for the most recent discharge. Discharge Date: 21       Spoke with: patient currently at PFT. Patient has all HFU appointments scheduled.     Discharge department/facility: [facility]    Scheduled appointment with PCP within 7-14 days    Follow Up  Future Appointments   Date Time Provider Riri Jara   2021  3:00 PM [facility] PULMONARY FUNCTION TESTING [facility] PFT Jaimee Dennysofi   2021  7:35 AM Fernanda Pitts MD W ORTHO [facility]   2021  8:30 AM SCHEDULE, [facility] [employer] [facility]   2021  7:45  [facility]   2021  9:15 AM Dami Will MD [facility] CLER CAR [facility]   2021 11:00 AM Alexandro Pitts MD W ORTHO [facility]   2021  7:05 AM SCHEDULE, [place] [employer] REMOTE [place] Salvatore Car [facility]   10/12/2021 10:15 AM Saleem Johnson MD CLERM PULM [facility]   10/13/2021  7:05 AM SCHEDULE, [place] [employer] REMOTE [place] Fruit [facility]   2021  7:05 AM SCHEDULE, [place] [employer] REMOTE Eldonna Fruit [facility]   2021  7:05 AM SCHEDULE, [place] [employer] REMOTE Eld[place] Fruit [facility]   2022  7:05 AM SCHEDULE, [place] [employer] REMOTE [employer] [place], [place]

## 2021-07-27 NOTE — PROGRESS NOTES
Phone message left to call PAT dept at [phone]  for history review and surgery instructions on 7/27/21 @ [phone]

## 2021-07-28 NOTE — PROCEDURES
[address]                 [address] [place] [address]                               PULMONARY FUNCTION    PATIENT NAME: Edna Rutherford                    :        1960  MED REC NO:   [id number]                          ROOM:  ACCOUNT NO:   [de-identified]                           ADMIT DATE: 2021  PROVIDER:     Juan Luis Vergara MD    DATE OF PROCEDURE:  2021    INDICATION:  COPD. FINDINGS:  1. Spirometry: The FEV1 is 1.73 liters, which is 67% of predicted. The FEV1/FVC ratio is reduced. Inhaled bronchodilators are given. There is a 12% improvement in the FEV1.  2.  Lung volumes: Total lung capacity of 5.4 liters or 87% of  predicted. 3.  Diffusion capacity:  DLCO is 18.36 mL/min/mmHg, which is 65% of  predicted. 4.  Flow volume loop shows an obstructive pattern. IMPRESSION:  1. A moderately severe obstructive lung defect is present, which is  only partially reversal to inhaled bronchodilators. 2.  There is no restrictive lung defect. 3.  There is a mild reduction in diffusion capacity.         Dana Monroy MD    D: 2021 15:00:07       T: 2021 17:15:43     [id number]  Job#: [id number]     Doc#: [id number]    CC:

## 2021-07-28 NOTE — PROGRESS NOTES
[address] [facility] time_0600___________        Surgery time_0730___________    Take the following medications with a sip of water: Follow your MD/Surgeons pre-procedure instructions regarding your medications    Do not eat or drink anything after 12:00 midnight prior to your surgery. This includes water chewing gum, mints and ice chips. You may brush your teeth and gargle the morning of your surgery, but do not swallow the water     Please see your family doctor/pediatrician for a history and physical and/or concerning medications. Bring any test results/reports from your physicians office. If you are under the care of a heart doctor or specialist doctor, please be aware that you may be asked to them for clearance    You may be asked to stop blood thinners such as Coumadin, Plavix, Fragmin, Lovenox, etc., or any anti-inflammatories such as:  Aspirin, Ibuprofen, Advil, Naproxen prior to your surgery. We also ask that you stop any OTC medications such as fish oil, vitamin E, glucosamine, garlic, Multivitamins, COQ 10, etc.    We ask that you do not smoke 24 hours prior to surgery  We ask that you do not  drink any alcoholic beverages 24 hours prior to surgery     You must make arrangements for a responsible adult to take you home after your surgery. For your safety you will not be allowed to leave alone or drive yourself home. Your surgery will be cancelled if you do not have a ride home. SAFETY FIRST. .call before you fall  Also for your safety, it is strongly suggested that someone stay with you the first 24 hours after your surgery. A parent or legal guardian must accompany a child scheduled for surgery and plan to stay at the hospital until the child is discharged. Please do not bring other children with you. For your comfort, please wear simple loose fitting clothing to the hospital.  Please do not bring valuables.     Do not wear any make-up or nail polish on your fingers or toes      For your safety, please do not wear any jewelry or body piercing's on the day of surgery. All jewelry must be removed. If you have dentures, they will be removed before going to operating room. For your convenience, we will provide you with a container. If you wear contact lenses or glasses, they will be removed, please bring a case for them. If you have a living will and a durable power of  for healthcare, please bring in a copy. As part of our patient safety program to minimize surgical site infections, we ask you to do the following:    · Please notify your surgeon if you develop any illness between         now and the  day of your surgery. · This includes a cough, cold, fever, sore throat, nausea,         or vomiting, and diarrhea, etc.  ·  Please notify your surgeon if you experience dizziness, shortness         of breath or blurred vision between now and the time of your surgery. Do not shave your operative site 96 hours prior to surgery. For face and neck surgery, men may use an electric razor 48 hours   prior to surgery. You may shower the night before surgery or the morning of   your surgery with an antibacterial soap. You will need to bring a photo ID and insurance card    [facility] has an onsite pharmacy, would you like to utilize our pharmacy     If you will be staying overnight and use a C-pap machine, please bring   your C-pap to hospital     Our goal is to provide you with excellent care, therefore, visitors will be limited to two(2) in the room at a time so that we may focus on providing this care for you. Please contact pre-admission testing if you have any further questions.                  [facility] phone number:  [phone] PAT fax number:  [phone]  Please note these are generalized instructions for all surgical cases, you may be provided with more specific instructions according to your surgery.

## 2021-07-29 ENCOUNTER — TELEPHONE (OUTPATIENT)
Dept: PULMONOLOGY | Age: 61
End: 2021-07-29

## 2021-07-29 NOTE — TELEPHONE ENCOUNTER
Please advise. Patient would like to d/c trelegy and start Pulmicort? Peggy Mohr MD 12 hours ago (9:11 PM)   GT  This is Sharonda Del Rosariosing wife. We have discussed the issue and checked with our pharmacy and he would like to try the Pulmicort, it is used for maintenance of COPD and is affordable to us. Can you call in a prescription to [facility] [phone]. I appreciate you talking and working with us in this matter. Thank you again. You  Nonda Manual Yesterday (8:07 AM)   KB  In order for you to find what is affordable for you I would recommend contacting your insurance and see what the price would be the inhaler you are interested in, a lot of the times it due to your insurance deductible once or if you meet that the medication price usually drops. But I would reach out to your insurance and see what the inhaler would cost you     Peggy Mohr MD 2 days ago   GT  You  prescribed Spiriva and I was unable to afford to have filled so at the last appointment I asked for something more affordable, you prescribed Trelegy. ..which is more expensive than the Spiriva. . therefore I was unable to have the prescription filled. I am asking for something AFFORDABLE. Is it possible we can try something that the average person can afford? Abreva maybe? Is that an option for me?

## 2021-07-30 ENCOUNTER — ANESTHESIA EVENT (OUTPATIENT)
Dept: OPERATING ROOM | Age: 61
End: 2021-07-30
Payer: COMMERCIAL

## 2021-07-30 NOTE — TELEPHONE ENCOUNTER
Spoke with patient whom states symbicort will cost $245 a month and this is still too much.  Still requesting pulmicort

## 2021-08-01 NOTE — TELEPHONE ENCOUNTER
No indication for pulmicort  Trial of Anoro Ellipta (Umeclidinium-Vilanterol 62.5-25 MCG) 1 puff daily

## 2021-08-02 ENCOUNTER — HOSPITAL ENCOUNTER (OUTPATIENT)
Age: 61
Setting detail: OBSERVATION
Discharge: HOME | End: 2021-08-03
Attending: ORTHOPAEDIC SURGERY | Admitting: ORTHOPAEDIC SURGERY
Payer: COMMERCIAL

## 2021-08-02 ENCOUNTER — ANESTHESIA (OUTPATIENT)
Dept: OPERATING ROOM | Age: 61
End: 2021-08-02
Payer: COMMERCIAL

## 2021-08-02 VITALS — SYSTOLIC BLOOD PRESSURE: 117 MMHG | OXYGEN SATURATION: 96 % | DIASTOLIC BLOOD PRESSURE: 56 MMHG

## 2021-08-02 LAB
A/G RATIO: 1.2 (ref 1.1–2.2)
ALBUMIN SERPL-MCNC: 3.6 G/DL (ref 3.4–5)
ALP BLD-CCNC: 103 U/L (ref 40–129)
ALT SERPL-CCNC: 10 U/L (ref 10–40)
ANION GAP SERPL CALCULATED.3IONS-SCNC: 10 MMOL/L (ref 3–16)
AST SERPL-CCNC: 12 U/L (ref 15–37)
BILIRUB SERPL-MCNC: 0.6 MG/DL (ref 0–1)
BUN BLDV-MCNC: 13 MG/DL (ref 7–20)
CALCIUM SERPL-MCNC: 8.9 MG/DL (ref 8.3–10.6)
CHLORIDE BLD-SCNC: 101 MMOL/L (ref 99–110)
CO2: 29 MMOL/L (ref 21–32)
CREAT SERPL-MCNC: 0.8 MG/DL (ref 0.9–1.3)
GFR AFRICAN AMERICAN: >60
GFR NON-AFRICAN AMERICAN: >60
GLOBULIN: 3.1 G/DL
GLUCOSE BLD-MCNC: 114 MG/DL (ref 70–99)
HCT VFR BLD CALC: 43.2 % (ref 40.5–52.5)
HEMOGLOBIN: 14.1 G/DL (ref 13.5–17.5)
MCH RBC QN AUTO: 27.1 PG (ref 26–34)
MCHC RBC AUTO-ENTMCNC: 32.5 G/DL (ref 31–36)
MCV RBC AUTO: 83.2 FL (ref 80–100)
PDW BLD-RTO: 15 % (ref 12.4–15.4)
PLATELET # BLD: 188 K/UL (ref 135–450)
PMV BLD AUTO: 8 FL (ref 5–10.5)
POTASSIUM REFLEX MAGNESIUM: 4.1 MMOL/L (ref 3.5–5.1)
RBC # BLD: 5.19 M/UL (ref 4.2–5.9)
REASON FOR REJECTION: NORMAL
REJECTED TEST: NORMAL
SARS-COV-2, NAAT: NOT DETECTED
SODIUM BLD-SCNC: 140 MMOL/L (ref 136–145)
TOTAL PROTEIN: 6.7 G/DL (ref 6.4–8.2)
TROPONIN: <0.01 NG/ML
TROPONIN: <0.01 NG/ML
WBC # BLD: 11.1 K/UL (ref 4–11)

## 2021-08-02 PROCEDURE — 2700000000 HC OXYGEN THERAPY PER DAY

## 2021-08-02 PROCEDURE — 88300 SURGICAL PATH GROSS: CPT

## 2021-08-02 PROCEDURE — G0378 HOSPITAL OBSERVATION PER HR: HCPCS

## 2021-08-02 PROCEDURE — 3700000000 HC ANESTHESIA ATTENDED CARE: Performed by: ORTHOPAEDIC SURGERY

## 2021-08-02 PROCEDURE — 6360000002 HC RX W HCPCS: Performed by: ORTHOPAEDIC SURGERY

## 2021-08-02 PROCEDURE — 96376 TX/PRO/DX INJ SAME DRUG ADON: CPT

## 2021-08-02 PROCEDURE — 96375 TX/PRO/DX INJ NEW DRUG ADDON: CPT

## 2021-08-02 PROCEDURE — 7100000001 HC PACU RECOVERY - ADDTL 15 MIN: Performed by: ORTHOPAEDIC SURGERY

## 2021-08-02 PROCEDURE — 6360000002 HC RX W HCPCS: Performed by: INTERNAL MEDICINE

## 2021-08-02 PROCEDURE — 87635 SARS-COV-2 COVID-19 AMP PRB: CPT

## 2021-08-02 PROCEDURE — 7100000000 HC PACU RECOVERY - FIRST 15 MIN: Performed by: ORTHOPAEDIC SURGERY

## 2021-08-02 PROCEDURE — 2580000003 HC RX 258: Performed by: INTERNAL MEDICINE

## 2021-08-02 PROCEDURE — 6370000000 HC RX 637 (ALT 250 FOR IP): Performed by: ANESTHESIOLOGY

## 2021-08-02 PROCEDURE — 2500000003 HC RX 250 WO HCPCS: Performed by: NURSE ANESTHETIST, CERTIFIED REGISTERED

## 2021-08-02 PROCEDURE — 2709999900 HC NON-CHARGEABLE SUPPLY: Performed by: ORTHOPAEDIC SURGERY

## 2021-08-02 PROCEDURE — 96365 THER/PROPH/DIAG IV INF INIT: CPT

## 2021-08-02 PROCEDURE — 3600000004 HC SURGERY LEVEL 4 BASE: Performed by: ORTHOPAEDIC SURGERY

## 2021-08-02 PROCEDURE — 20680 REMOVAL OF IMPLANT DEEP: CPT | Performed by: ORTHOPAEDIC SURGERY

## 2021-08-02 PROCEDURE — 80053 COMPREHEN METABOLIC PANEL: CPT

## 2021-08-02 PROCEDURE — 3700000001 HC ADD 15 MINUTES (ANESTHESIA): Performed by: ORTHOPAEDIC SURGERY

## 2021-08-02 PROCEDURE — 6360000002 HC RX W HCPCS: Performed by: NURSE ANESTHETIST, CERTIFIED REGISTERED

## 2021-08-02 PROCEDURE — 93005 ELECTROCARDIOGRAM TRACING: CPT | Performed by: ANESTHESIOLOGY

## 2021-08-02 PROCEDURE — 36415 COLL VENOUS BLD VENIPUNCTURE: CPT

## 2021-08-02 PROCEDURE — 3600000014 HC SURGERY LEVEL 4 ADDTL 15MIN: Performed by: ORTHOPAEDIC SURGERY

## 2021-08-02 PROCEDURE — 6370000000 HC RX 637 (ALT 250 FOR IP): Performed by: INTERNAL MEDICINE

## 2021-08-02 PROCEDURE — 85027 COMPLETE CBC AUTOMATED: CPT

## 2021-08-02 PROCEDURE — 93005 ELECTROCARDIOGRAM TRACING: CPT | Performed by: INTERNAL MEDICINE

## 2021-08-02 PROCEDURE — 99214 OFFICE O/P EST MOD 30 MIN: CPT | Performed by: INTERNAL MEDICINE

## 2021-08-02 PROCEDURE — 84484 ASSAY OF TROPONIN QUANT: CPT

## 2021-08-02 PROCEDURE — 94761 N-INVAS EAR/PLS OXIMETRY MLT: CPT

## 2021-08-02 PROCEDURE — 2500000003 HC RX 250 WO HCPCS: Performed by: ORTHOPAEDIC SURGERY

## 2021-08-02 PROCEDURE — 2580000003 HC RX 258: Performed by: ANESTHESIOLOGY

## 2021-08-02 PROCEDURE — 6360000002 HC RX W HCPCS: Performed by: ANESTHESIOLOGY

## 2021-08-02 RX ORDER — ASPIRIN 81 MG/1
81 TABLET, CHEWABLE ORAL DAILY
Status: DISCONTINUED | OUTPATIENT
Start: 2021-08-03 | End: 2021-08-03 | Stop reason: HOSPADM

## 2021-08-02 RX ORDER — LOSARTAN POTASSIUM 25 MG/1
12.5 TABLET ORAL DAILY
Status: DISCONTINUED | OUTPATIENT
Start: 2021-08-02 | End: 2021-08-02

## 2021-08-02 RX ORDER — POLYETHYLENE GLYCOL 3350 17 G/17G
17 POWDER, FOR SOLUTION ORAL DAILY PRN
Status: DISCONTINUED | OUTPATIENT
Start: 2021-08-02 | End: 2021-08-03 | Stop reason: HOSPADM

## 2021-08-02 RX ORDER — LOSARTAN POTASSIUM 25 MG/1
12.5 TABLET ORAL DAILY
Status: DISCONTINUED | OUTPATIENT
Start: 2021-08-03 | End: 2021-08-03 | Stop reason: HOSPADM

## 2021-08-02 RX ORDER — HYDRALAZINE HYDROCHLORIDE 20 MG/ML
5 INJECTION INTRAMUSCULAR; INTRAVENOUS EVERY 10 MIN PRN
Status: DISCONTINUED | OUTPATIENT
Start: 2021-08-02 | End: 2021-08-02 | Stop reason: HOSPADM

## 2021-08-02 RX ORDER — CLOPIDOGREL BISULFATE 75 MG/1
75 TABLET ORAL DAILY
Status: DISCONTINUED | OUTPATIENT
Start: 2021-08-02 | End: 2021-08-03 | Stop reason: HOSPADM

## 2021-08-02 RX ORDER — FUROSEMIDE 40 MG/1
40 TABLET ORAL DAILY
Status: DISCONTINUED | OUTPATIENT
Start: 2021-08-02 | End: 2021-08-03 | Stop reason: HOSPADM

## 2021-08-02 RX ORDER — ONDANSETRON 2 MG/ML
4 INJECTION INTRAMUSCULAR; INTRAVENOUS
Status: DISCONTINUED | OUTPATIENT
Start: 2021-08-02 | End: 2021-08-02 | Stop reason: HOSPADM

## 2021-08-02 RX ORDER — SODIUM CHLORIDE 9 MG/ML
INJECTION, SOLUTION INTRAVENOUS CONTINUOUS
Status: DISCONTINUED | OUTPATIENT
Start: 2021-08-02 | End: 2021-08-02

## 2021-08-02 RX ORDER — FENTANYL CITRATE 50 UG/ML
INJECTION, SOLUTION INTRAMUSCULAR; INTRAVENOUS PRN
Status: DISCONTINUED | OUTPATIENT
Start: 2021-08-02 | End: 2021-08-02 | Stop reason: SDUPTHER

## 2021-08-02 RX ORDER — SERTRALINE HYDROCHLORIDE 100 MG/1
100 TABLET, FILM COATED ORAL DAILY
Status: DISCONTINUED | OUTPATIENT
Start: 2021-08-02 | End: 2021-08-03 | Stop reason: HOSPADM

## 2021-08-02 RX ORDER — SODIUM CHLORIDE 0.9 % (FLUSH) 0.9 %
5-40 SYRINGE (ML) INJECTION EVERY 12 HOURS SCHEDULED
Status: DISCONTINUED | OUTPATIENT
Start: 2021-08-02 | End: 2021-08-03 | Stop reason: HOSPADM

## 2021-08-02 RX ORDER — TRAZODONE HYDROCHLORIDE 100 MG/1
100 TABLET ORAL 2 TIMES DAILY
Status: DISCONTINUED | OUTPATIENT
Start: 2021-08-02 | End: 2021-08-03 | Stop reason: HOSPADM

## 2021-08-02 RX ORDER — SODIUM CHLORIDE 0.9 % (FLUSH) 0.9 %
10 SYRINGE (ML) INJECTION PRN
Status: DISCONTINUED | OUTPATIENT
Start: 2021-08-02 | End: 2021-08-02 | Stop reason: HOSPADM

## 2021-08-02 RX ORDER — MEPERIDINE HYDROCHLORIDE 25 MG/ML
12.5 INJECTION INTRAMUSCULAR; INTRAVENOUS; SUBCUTANEOUS
Status: DISCONTINUED | OUTPATIENT
Start: 2021-08-02 | End: 2021-08-02 | Stop reason: HOSPADM

## 2021-08-02 RX ORDER — SODIUM CHLORIDE 0.9 % (FLUSH) 0.9 %
5-40 SYRINGE (ML) INJECTION PRN
Status: DISCONTINUED | OUTPATIENT
Start: 2021-08-02 | End: 2021-08-03 | Stop reason: HOSPADM

## 2021-08-02 RX ORDER — ATORVASTATIN CALCIUM 40 MG/1
40 TABLET, FILM COATED ORAL NIGHTLY
Status: DISCONTINUED | OUTPATIENT
Start: 2021-08-02 | End: 2021-08-03 | Stop reason: HOSPADM

## 2021-08-02 RX ORDER — MORPHINE SULFATE 2 MG/ML
2 INJECTION, SOLUTION INTRAMUSCULAR; INTRAVENOUS ONCE
Status: COMPLETED | OUTPATIENT
Start: 2021-08-02 | End: 2021-08-02

## 2021-08-02 RX ORDER — LIDOCAINE HYDROCHLORIDE 10 MG/ML
INJECTION, SOLUTION EPIDURAL; INFILTRATION; INTRACAUDAL; PERINEURAL
Status: COMPLETED | OUTPATIENT
Start: 2021-08-02 | End: 2021-08-02

## 2021-08-02 RX ORDER — ALBUTEROL SULFATE 2.5 MG/3ML
2.5 SOLUTION RESPIRATORY (INHALATION) EVERY 6 HOURS PRN
Status: DISCONTINUED | OUTPATIENT
Start: 2021-08-02 | End: 2021-08-03 | Stop reason: HOSPADM

## 2021-08-02 RX ORDER — PROPOFOL 10 MG/ML
INJECTION, EMULSION INTRAVENOUS PRN
Status: DISCONTINUED | OUTPATIENT
Start: 2021-08-02 | End: 2021-08-02 | Stop reason: SDUPTHER

## 2021-08-02 RX ORDER — SODIUM CHLORIDE 9 MG/ML
25 INJECTION, SOLUTION INTRAVENOUS PRN
Status: DISCONTINUED | OUTPATIENT
Start: 2021-08-02 | End: 2021-08-02 | Stop reason: HOSPADM

## 2021-08-02 RX ORDER — ACETAMINOPHEN 650 MG/1
650 SUPPOSITORY RECTAL EVERY 6 HOURS PRN
Status: DISCONTINUED | OUTPATIENT
Start: 2021-08-02 | End: 2021-08-03 | Stop reason: HOSPADM

## 2021-08-02 RX ORDER — SODIUM CHLORIDE 9 MG/ML
25 INJECTION, SOLUTION INTRAVENOUS PRN
Status: DISCONTINUED | OUTPATIENT
Start: 2021-08-02 | End: 2021-08-03 | Stop reason: HOSPADM

## 2021-08-02 RX ORDER — ONDANSETRON 4 MG/1
4 TABLET, ORALLY DISINTEGRATING ORAL EVERY 8 HOURS PRN
Status: DISCONTINUED | OUTPATIENT
Start: 2021-08-02 | End: 2021-08-03 | Stop reason: HOSPADM

## 2021-08-02 RX ORDER — NITROGLYCERIN 0.4 MG/1
0.4 TABLET SUBLINGUAL ONCE
Status: DISCONTINUED | OUTPATIENT
Start: 2021-08-02 | End: 2021-08-02

## 2021-08-02 RX ORDER — ONDANSETRON 2 MG/ML
4 INJECTION INTRAMUSCULAR; INTRAVENOUS EVERY 6 HOURS PRN
Status: DISCONTINUED | OUTPATIENT
Start: 2021-08-02 | End: 2021-08-03 | Stop reason: HOSPADM

## 2021-08-02 RX ORDER — NITROGLYCERIN 0.4 MG/1
0.4 TABLET SUBLINGUAL EVERY 5 MIN PRN
Status: DISCONTINUED | OUTPATIENT
Start: 2021-08-02 | End: 2021-08-03 | Stop reason: HOSPADM

## 2021-08-02 RX ORDER — PROMETHAZINE HYDROCHLORIDE 25 MG/ML
6.25 INJECTION, SOLUTION INTRAMUSCULAR; INTRAVENOUS
Status: DISCONTINUED | OUTPATIENT
Start: 2021-08-02 | End: 2021-08-02 | Stop reason: HOSPADM

## 2021-08-02 RX ORDER — BUDESONIDE AND FORMOTEROL FUMARATE DIHYDRATE 160; 4.5 UG/1; UG/1
2 AEROSOL RESPIRATORY (INHALATION) 2 TIMES DAILY
Status: DISCONTINUED | OUTPATIENT
Start: 2021-08-03 | End: 2021-08-03 | Stop reason: HOSPADM

## 2021-08-02 RX ORDER — CEFAZOLIN SODIUM 1 G/3ML
3000 INJECTION, POWDER, FOR SOLUTION INTRAMUSCULAR; INTRAVENOUS EVERY 8 HOURS
Status: DISCONTINUED | OUTPATIENT
Start: 2021-08-02 | End: 2021-08-02 | Stop reason: CLARIF

## 2021-08-02 RX ORDER — ACETAMINOPHEN 325 MG/1
650 TABLET ORAL EVERY 6 HOURS PRN
Status: DISCONTINUED | OUTPATIENT
Start: 2021-08-02 | End: 2021-08-03 | Stop reason: HOSPADM

## 2021-08-02 RX ORDER — SODIUM CHLORIDE 0.9 % (FLUSH) 0.9 %
10 SYRINGE (ML) INJECTION EVERY 12 HOURS SCHEDULED
Status: DISCONTINUED | OUTPATIENT
Start: 2021-08-02 | End: 2021-08-02 | Stop reason: HOSPADM

## 2021-08-02 RX ORDER — MORPHINE SULFATE 2 MG/ML
2 INJECTION, SOLUTION INTRAMUSCULAR; INTRAVENOUS
Status: DISCONTINUED | OUTPATIENT
Start: 2021-08-02 | End: 2021-08-03

## 2021-08-02 RX ORDER — FENTANYL CITRATE 50 UG/ML
50 INJECTION, SOLUTION INTRAMUSCULAR; INTRAVENOUS EVERY 5 MIN PRN
Status: DISCONTINUED | OUTPATIENT
Start: 2021-08-02 | End: 2021-08-02 | Stop reason: HOSPADM

## 2021-08-02 RX ORDER — CEPHALEXIN 500 MG/1
500 CAPSULE ORAL 4 TIMES DAILY
Qty: 12 CAPSULE | Refills: 0 | Status: SHIPPED | OUTPATIENT
Start: 2021-08-02 | End: 2021-08-05

## 2021-08-02 RX ORDER — LIDOCAINE HYDROCHLORIDE 20 MG/ML
INJECTION, SOLUTION EPIDURAL; INFILTRATION; INTRACAUDAL; PERINEURAL PRN
Status: DISCONTINUED | OUTPATIENT
Start: 2021-08-02 | End: 2021-08-02 | Stop reason: SDUPTHER

## 2021-08-02 RX ORDER — FENTANYL CITRATE 50 UG/ML
25 INJECTION, SOLUTION INTRAMUSCULAR; INTRAVENOUS EVERY 5 MIN PRN
Status: DISCONTINUED | OUTPATIENT
Start: 2021-08-02 | End: 2021-08-02 | Stop reason: HOSPADM

## 2021-08-02 RX ADMIN — MORPHINE SULFATE 2 MG: 2 INJECTION, SOLUTION INTRAMUSCULAR; INTRAVENOUS at 20:09

## 2021-08-02 RX ADMIN — ENOXAPARIN SODIUM 40 MG: 40 INJECTION SUBCUTANEOUS at 20:02

## 2021-08-02 RX ADMIN — SODIUM CHLORIDE 25 ML: 9 INJECTION, SOLUTION INTRAVENOUS at 23:37

## 2021-08-02 RX ADMIN — SODIUM CHLORIDE, PRESERVATIVE FREE 10 ML: 5 INJECTION INTRAVENOUS at 20:03

## 2021-08-02 RX ADMIN — Medication 3000 MG: at 07:32

## 2021-08-02 RX ADMIN — CLOPIDOGREL BISULFATE 75 MG: 75 TABLET ORAL at 15:51

## 2021-08-02 RX ADMIN — MORPHINE SULFATE 2 MG: 2 INJECTION, SOLUTION INTRAMUSCULAR; INTRAVENOUS at 18:09

## 2021-08-02 RX ADMIN — ATORVASTATIN CALCIUM 40 MG: 40 TABLET, FILM COATED ORAL at 20:02

## 2021-08-02 RX ADMIN — SODIUM CHLORIDE, PRESERVATIVE FREE 10 ML: 5 INJECTION INTRAVENOUS at 13:53

## 2021-08-02 RX ADMIN — NITROGLYCERIN 0.4 MG: 0.4 TABLET, ORALLY DISINTEGRATING SUBLINGUAL at 08:09

## 2021-08-02 RX ADMIN — MORPHINE SULFATE 2 MG: 2 INJECTION, SOLUTION INTRAMUSCULAR; INTRAVENOUS at 23:40

## 2021-08-02 RX ADMIN — FENTANYL CITRATE 25 MCG: 50 INJECTION INTRAMUSCULAR; INTRAVENOUS at 07:30

## 2021-08-02 RX ADMIN — NITROGLYCERIN 0.4 MG: 0.4 TABLET, ORALLY DISINTEGRATING SUBLINGUAL at 08:14

## 2021-08-02 RX ADMIN — MORPHINE SULFATE 2 MG: 2 INJECTION, SOLUTION INTRAMUSCULAR; INTRAVENOUS at 08:28

## 2021-08-02 RX ADMIN — SERTRALINE 100 MG: 100 TABLET, FILM COATED ORAL at 15:51

## 2021-08-02 RX ADMIN — LIDOCAINE HYDROCHLORIDE 30 MG: 20 INJECTION, SOLUTION EPIDURAL; INFILTRATION; INTRACAUDAL; PERINEURAL at 07:30

## 2021-08-02 RX ADMIN — NITROGLYCERIN 0.4 MG: 0.4 TABLET, ORALLY DISINTEGRATING SUBLINGUAL at 08:19

## 2021-08-02 RX ADMIN — FUROSEMIDE 40 MG: 40 TABLET ORAL at 15:51

## 2021-08-02 RX ADMIN — PROPOFOL 60 MG: 10 INJECTION, EMULSION INTRAVENOUS at 07:30

## 2021-08-02 RX ADMIN — SODIUM CHLORIDE: 9 INJECTION, SOLUTION INTRAVENOUS at 07:05

## 2021-08-02 RX ADMIN — FENTANYL CITRATE 50 MCG: 0.05 INJECTION, SOLUTION INTRAMUSCULAR; INTRAVENOUS at 08:08

## 2021-08-02 RX ADMIN — TRAZODONE HYDROCHLORIDE 100 MG: 100 TABLET ORAL at 20:02

## 2021-08-02 RX ADMIN — FENTANYL CITRATE 25 MCG: 50 INJECTION INTRAMUSCULAR; INTRAVENOUS at 07:32

## 2021-08-02 RX ADMIN — FENTANYL CITRATE 50 MCG: 0.05 INJECTION, SOLUTION INTRAMUSCULAR; INTRAVENOUS at 07:59

## 2021-08-02 RX ADMIN — Medication 3000 MG: at 23:39

## 2021-08-02 RX ADMIN — NITROGLYCERIN 0.4 MG: 0.4 TABLET, ORALLY DISINTEGRATING SUBLINGUAL at 17:51

## 2021-08-02 RX ADMIN — FENTANYL CITRATE 25 MCG: 50 INJECTION INTRAMUSCULAR; INTRAVENOUS at 07:34

## 2021-08-02 RX ADMIN — FENTANYL CITRATE 25 MCG: 50 INJECTION INTRAMUSCULAR; INTRAVENOUS at 07:37

## 2021-08-02 ASSESSMENT — PULMONARY FUNCTION TESTS
PIF_VALUE: 0
PIF_VALUE: 1
PIF_VALUE: 0
PIF_VALUE: 2
PIF_VALUE: 0

## 2021-08-02 ASSESSMENT — PAIN SCALES - GENERAL
PAINLEVEL_OUTOF10: 6
PAINLEVEL_OUTOF10: 0
PAINLEVEL_OUTOF10: 7
PAINLEVEL_OUTOF10: 6
PAINLEVEL_OUTOF10: 7
PAINLEVEL_OUTOF10: 7
PAINLEVEL_OUTOF10: 0
PAINLEVEL_OUTOF10: 6
PAINLEVEL_OUTOF10: 10
PAINLEVEL_OUTOF10: 6
PAINLEVEL_OUTOF10: 5

## 2021-08-02 ASSESSMENT — PAIN DESCRIPTION - PAIN TYPE
TYPE: ACUTE PAIN

## 2021-08-02 ASSESSMENT — PAIN DESCRIPTION - LOCATION
LOCATION: CHEST
LOCATION: ARM;BACK;CHEST
LOCATION: CHEST

## 2021-08-02 ASSESSMENT — PAIN - FUNCTIONAL ASSESSMENT
PAIN_FUNCTIONAL_ASSESSMENT: 0-10
PAIN_FUNCTIONAL_ASSESSMENT: ACTIVITIES ARE NOT PREVENTED

## 2021-08-02 ASSESSMENT — PAIN DESCRIPTION - DESCRIPTORS
DESCRIPTORS: ACHING;STABBING
DESCRIPTORS: ACHING;STABBING
DESCRIPTORS: STABBING
DESCRIPTORS: STABBING
DESCRIPTORS: ACHING
DESCRIPTORS: STABBING
DESCRIPTORS: STABBING
DESCRIPTORS: ACHING
DESCRIPTORS: ACHING;STABBING
DESCRIPTORS: STABBING;THROBBING

## 2021-08-02 ASSESSMENT — PAIN DESCRIPTION - PROGRESSION
CLINICAL_PROGRESSION: NOT CHANGED
CLINICAL_PROGRESSION: GRADUALLY IMPROVING
CLINICAL_PROGRESSION: GRADUALLY IMPROVING
CLINICAL_PROGRESSION: NOT CHANGED
CLINICAL_PROGRESSION: RESOLVED
CLINICAL_PROGRESSION: GRADUALLY IMPROVING
CLINICAL_PROGRESSION: NOT CHANGED
CLINICAL_PROGRESSION: GRADUALLY IMPROVING

## 2021-08-02 ASSESSMENT — PAIN DESCRIPTION - ONSET
ONSET: ON-GOING

## 2021-08-02 ASSESSMENT — PAIN DESCRIPTION - ORIENTATION
ORIENTATION: LEFT
ORIENTATION: MID

## 2021-08-02 ASSESSMENT — PAIN DESCRIPTION - DIRECTION
RADIATING_TOWARDS: BACK
RADIATING_TOWARDS: BACK, LEG

## 2021-08-02 ASSESSMENT — PAIN DESCRIPTION - FREQUENCY
FREQUENCY: CONTINUOUS

## 2021-08-02 ASSESSMENT — ENCOUNTER SYMPTOMS: SHORTNESS OF BREATH: 1

## 2021-08-02 NOTE — H&P
Preoperative H&P Update    The patient's History and Physical in the medical record from 7/23/2021 was reviewed by me today.     Past Medical History:   Diagnosis Date    Acute diverticulitis 04/10/2021    Acute kidney injury ([address] 04/08/2021    Acute on chronic respiratory failure with hypoxemia (HCC)     Acute respiratory failure ([address] 08/19/2020    Acute respiratory failure with hypoxia ([address]     Anxiety 01/06/2020    Aortic valve calcification 09/2020    seen on lung CT    CAD in native artery 04/14/2021    Chronic obstructive pulmonary disease ([address] 09/03/2019    PFT done 9/03/19    Chronic systolic congestive heart failure ([address] 04/10/2021    Class 3 severe obesity with body mass index (BMI) of 45.0 to 49.9 in adult [facility])     Coronary artery calcification 09/2020    seen on lung ct    Coronary artery disease involving native heart with angina pectoris ([address] 09/22/2020    Crush injury of right foot 07/23/2015    DDD (degenerative disc disease), lumbar 01/06/2020    Depression 01/06/2020    Diverticulitis of colon 04/10/2021    Erectile dysfunction 01/06/2020    Fatigue 01/06/2020    HNP (herniated nucleus pulposus), lumbar 01/06/2020    Hyperglycemia 01/06/2020    Hyperlipidemia     Hypersomnia 01/06/2020    Hypertension     Insomnia     Ischemic cardiomyopathy     Kidney stone     Lumbar radiculopathy 01/06/2020    Lumbar spine pain 01/06/2020    LVH (left ventricular hypertrophy)     seen on echo 8/2020, moderate    Mobitz type I Wenckebach atrioventricular block 04/10/2021    Moderate protein-calorie malnutrition ([address] 03/17/2020    Observed sleep apnea 01/06/2020    OANH (obstructive sleep apnea) 01/06/2020    no C-pap is getting tested    Pneumonia, primary atypical     Reactive depression 01/06/2020    S/P three vessel coronary artery bypass 10/26/2020    Spondylosis without myelopathy or radiculopathy, lumbar region 01/06/2020    Tobacco abuse 01/06/2020    Tobacco use 01/06/2020    Wears dentures     full set     Past Surgical History:   Procedure Laterality Date    CHOLECYSTECTOMY      CORONARY ANGIOPLASTY WITH STENT PLACEMENT  03/2021    CORONARY ARTERY BYPASS GRAFT N/A 9/25/2020    CORONARY ARTERY BYPASS GRAFTING X3, INTERNAL MAMMARY ARTERY, SAPHENOUS VEIN GRAFT, ON PUMP, STERNAL PLATING, 5 LEVEL BILATERAL INTERCOSTAL NERVE BLOCK, PLATELET GEL APPLICATION performed by Lenor Cabot, MD at [facility]  03/16/2011    cystoscopy left ureteroscopy, left retrograde, double J stent placement    FINGER AMPUTATION Right 2/2/2021    RIGHT MIDDLE FINGER IRRIGATION AND DEBRIDEMENT WITH REVISION AMPUTATION AND BONE SHORTENING, POSSIBLE NAIL ABLATION performed by Sveta Renae MD at [address]  Right 12/24/2019    RIGHT LUMBAR FIVE SACRAL ONE TRANSFORAMINAL EPIDURAL STEROID INJECTION SITE CONFIRMED BY FLUOROSCOPY performed by Arin Wang MD at [address] [facility] Right 1/7/2020    RIGHT LUMBAR FOUR AND LUMBAR FIVE TRANSFORAMINAL EPIDURAL STEROID INJECTION SITE CONFIRMED BY FLUOROSCOPY performed by Arin Wang MD at [place] [address]     No current facility-administered medications on file prior to encounter.      Current Outpatient Medications on File Prior to Encounter   Medication Sig Dispense Refill    losartan (COZAAR) 25 MG tablet Take 0.5 tablets by mouth daily 30 tablet 5    OXYGEN Inhale 2 L into the lungs as needed       meclizine (ANTIVERT) 25 MG tablet Take 1 tablet by mouth 3 times daily as needed for Dizziness 30 tablet 0    nitroGLYCERIN (NITROSTAT) 0.4 MG SL tablet Place 1 tablet under the tongue every 5 minutes as needed for Chest pain 25 tablet 3    furosemide (LASIX) 80 MG tablet Take 0.5 tablets by mouth daily 45 tablet 3    metoprolol succinate (TOPROL XL) 25 MG extended release tablet Take 1 tablet by mouth daily 30 tablet 11    ipratropium (ATROVENT) 0.02 % nebulizer solution Take 2.5 mLs by nebulization 3 times daily 225 mL 3    traZODone (DESYREL) 100 MG tablet Take 1 tablet by mouth nightly (Patient taking differently: Take 100 mg by mouth 2 times daily Am pm) 30 tablet 5    clopidogrel (PLAVIX) 75 MG tablet Take 1 tablet by mouth daily 90 tablet 3    atorvastatin (LIPITOR) 80 MG tablet Take 1 tablet by mouth nightly (Patient taking differently: Take 40 mg by mouth nightly ) 90 tablet 3    aspirin 81 MG EC tablet Take 1 tablet by mouth daily 30 tablet 0    albuterol (PROVENTIL) (2.5 MG/3ML) 0.083% nebulizer solution Take 3 mLs by nebulization every 6 hours as needed for Wheezing or Shortness of Breath 25 vial 1    albuterol sulfate HFA (PROVENTIL HFA) 108 (90 Base) MCG/ACT inhaler Inhale 2 puffs into the lungs every 6 hours as needed for Wheezing 1 Inhaler 0       Allergies   Allergen Reactions    Dilaudid [Hydromorphone Hcl] Nausea And Vomiting    Codeine Itching      I reviewed the HPI, medications, allergies, reason for surgery, diagnosis and treatment plan and there has been no change. The patient was evaluated by me today. Physical exam findings for this update include:    Vitals:    08/02/21 0627   BP: 134/77   Pulse: 60   Resp: 18   Temp: 96.9 °F (36.1 °C)   SpO2: 96%     Airway is intact  Chest: chest clear, no wheezing, rales, normal symmetric air entry, no tachypnea, retractions or cyanosis  Heart: regular rate and rhythm ; heart sounds normal  Findings on exam of the body region where surgery is to be performed include:  Left foot painful deep screw.     Electronically signed by Lamar Evans MD on 8/2/2021 at 6:32 AM

## 2021-08-02 NOTE — PROGRESS NOTES
[facility] Orthopedic Surgery   Progress Note      S/P :  SUBJECTIVE  In bed. Asleep but aroused to name, Oriented x3. States had screw removed from left ankle today and after surgery had chest pain. He has been admitted for further evaluation of the chest pain. At this time chest pain completely subsided. . Pain is not  described in left ankle     OBJECTIVE              Physical                      VITALS:  /81   Pulse 60   Temp 97.4 °F (36.3 °C) (Oral)   Resp 16   Ht 5' 4\" (1.626 m)   Wt 290 lb (131.5 kg)   SpO2 99%   BMI 49.78 kg/m²                     MUSCULOSKELETAL:  left foot NVI. Wiggles toes to command. Left toes warm and pink with brisk cap refill noted. NEUROLOGIC:                                  Sensory:  Touch:  Left Lower Extremity:  normal                                                 Surgical wound appears clean and dry left foot with ACE over dressing. Foot elevated.      Data       CBC:   Lab Results   Component Value Date    WBC 10.0 04/22/2021    RBC 3.84 04/22/2021    HGB 11.4 04/22/2021    HCT 34.6 04/22/2021    MCV 90.1 04/22/2021    MCH 29.7 04/22/2021    MCHC 33.0 04/22/2021    RDW 18.4 04/22/2021     04/22/2021    MPV 8.4 04/22/2021        WBC:    Lab Results   Component Value Date    WBC 10.0 04/22/2021        Hemoglobin/Hematocrit:    Lab Results   Component Value Date    HGB 11.4 04/22/2021    HCT 34.6 04/22/2021        PT/INR:    Lab Results   Component Value Date    PROTIME 12.7 10/19/2020    INR 1.10 10/19/2020              Current Inpatient Medications             Current Facility-Administered Medications: nitroGLYCERIN (NITROSTAT) SL tablet 0.4 mg, 0.4 mg, Sublingual, Q5 Min PRN  sodium chloride flush 0.9 % injection 5-40 mL, 5-40 mL, Intravenous, 2 times per day  sodium chloride flush 0.9 % injection 5-40 mL, 5-40 mL, Intravenous, PRN  0.9 % sodium chloride infusion, 25 mL, Intravenous, PRN  ondansetron (ZOFRAN-ODT) disintegrating tablet 4 mg, 4

## 2021-08-02 NOTE — ANESTHESIA PRE PROCEDURE
[facility] Department of Anesthesiology  Pre-Anesthesia Evaluation/Consultation       Name:  Milton Velasquez  : 1960  Age:  64 y.o.                                            MRN:  [id number]  Date: 2021           Surgeon: Surgeon(s):  Tim Blackmon MD    Procedure: Procedure(s):  LEFT FOOT SCREW REMOVAL     Allergies   Allergen Reactions    Dilaudid [Hydromorphone Hcl] Nausea And Vomiting    Codeine Itching     Patient Active Problem List   Diagnosis    Crush injury of right foot    Shortness of breath    Chest pain    Erectile dysfunction    Hyperglycemia    Anxiety    Depression    Tobacco abuse    History of alcohol abuse    Fatigue    OANH (obstructive sleep apnea)    Hypersomnia    Chronic obstructive pulmonary disease (HCC)    Lumbar radiculopathy    HNP (herniated nucleus pulposus), lumbar    Spondylosis without myelopathy or radiculopathy, lumbar region    DDD (degenerative disc disease), lumbar    Lumbar spine pain    Acute respiratory distress    Class 3 severe obesity with body mass index (BMI) of 45.0 to 49.9 in adult ([address]    Pneumonia, primary atypical    Acute respiratory failure with hypoxia ([address]    Moderate protein-calorie malnutrition (HCC)    Acute respiratory failure (HCC)    Acute on chronic respiratory failure with hypoxemia (HCC)    Acute diastolic congestive heart failure (HCC)    Hyperlipidemia    Insomnia    Morbid obesity with BMI of 45.0-49.9, adult ([address]    Abnormal cardiovascular stress test    Coronary artery disease involving native heart    S/P three vessel coronary artery bypass    Acute kidney injury ([address]    Chronic combined systolic and diastolic congestive heart failure (HCC)    Mobitz type I Wenckebach atrioventricular block    Asystole ([address]    Ischemic cardiomyopathy    Diverticulitis of colon    Coronary artery disease involving native coronary artery of native heart with unstable angina pectoris (HCC)    CHB (complete heart block) (HCC)    Sinus bradycardia    Cellulitis    Acute deep vein thrombosis (DVT) of left upper extremity (HCC)    Cellulitis of chest wall    Painful orthopaedic hardware ([address]    Encounter for loop recorder check-medtronic     Past Medical History:   Diagnosis Date    Acute diverticulitis 04/10/2021    Acute kidney injury ([address] 04/08/2021    Acute on chronic respiratory failure with hypoxemia (HCC)     Acute respiratory failure ([address] 08/19/2020    Acute respiratory failure with hypoxia ([address]     Anxiety 01/06/2020    Aortic valve calcification 09/2020    seen on lung CT    CAD in native artery 04/14/2021    Chronic obstructive pulmonary disease ([address] 09/03/2019    PFT done 9/03/19    Chronic systolic congestive heart failure ([address] 04/10/2021    Class 3 severe obesity with body mass index (BMI) of 45.0 to 49.9 in adult [facility])     Coronary artery calcification 09/2020    seen on lung ct    Coronary artery disease involving native heart with angina pectoris ([address] 09/22/2020    Crush injury of right foot 07/23/2015    DDD (degenerative disc disease), lumbar 01/06/2020    Depression 01/06/2020    Diverticulitis of colon 04/10/2021    Erectile dysfunction 01/06/2020    Fatigue 01/06/2020    HNP (herniated nucleus pulposus), lumbar 01/06/2020    Hyperglycemia 01/06/2020    Hyperlipidemia     Hypersomnia 01/06/2020    Hypertension     Insomnia     Ischemic cardiomyopathy     Kidney stone     Lumbar radiculopathy 01/06/2020    Lumbar spine pain 01/06/2020    LVH (left ventricular hypertrophy)     seen on echo 8/2020, moderate    Mobitz type I Wenckebach atrioventricular block 04/10/2021    Moderate protein-calorie malnutrition ([address] 03/17/2020    Observed sleep apnea 01/06/2020    OANH (obstructive sleep apnea) 01/06/2020    no C-pap is getting tested    Pneumonia, primary atypical     Reactive depression 01/06/2020    S/P three vessel coronary artery bypass 10/26/2020    Spondylosis without myelopathy or radiculopathy, lumbar region 2020    Tobacco abuse 2020    Tobacco use 2020    Wears dentures     full set     Past Surgical History:   Procedure Laterality Date    CHOLECYSTECTOMY      CORONARY ANGIOPLASTY WITH STENT PLACEMENT  2021    CORONARY ARTERY BYPASS GRAFT N/A 2020    CORONARY ARTERY BYPASS GRAFTING X3, INTERNAL MAMMARY ARTERY, SAPHENOUS VEIN GRAFT, ON PUMP, STERNAL PLATING, 5 LEVEL BILATERAL INTERCOSTAL NERVE BLOCK, PLATELET GEL APPLICATION performed by Modesto Miranda MD at [address]  2011    cystoscopy left ureteroscopy, left retrograde, double J stent placement    FINGER AMPUTATION Right 2021    RIGHT MIDDLE FINGER IRRIGATION AND DEBRIDEMENT WITH REVISION AMPUTATION AND BONE SHORTENING, POSSIBLE NAIL ABLATION performed by Roxie Dolan MD at [address] [place] [address] 2019    RIGHT LUMBAR FIVE SACRAL ONE TRANSFORAMINAL EPIDURAL STEROID INJECTION SITE CONFIRMED BY FLUOROSCOPY performed by Drea Wilder MD at [facility] 2020    RIGHT LUMBAR FOUR AND LUMBAR FIVE TRANSFORAMINAL EPIDURAL STEROID INJECTION SITE CONFIRMED BY FLUOROSCOPY performed by Drea Wilder MD at [place] [address]     Social History     Tobacco Use    Smoking status: Former Smoker     Packs/day: 2.00     Years: 35.00     Pack years: 70.00     Types: Cigarettes     Quit date: 3/1/2020     Years since quittin.4    Smokeless tobacco: Never Used   Vaping Use    Vaping Use: Never used   Substance Use Topics    Alcohol use: No    Drug use: Not Currently     Types: Marijuana, Cocaine     Comment: hx of drug use 30 yrs ago     Medications  No current facility-administered medications on file prior to encounter.      Current Outpatient Medications on File Prior to Encounter   Medication Sig Dispense Refill    losartan (COZAAR) 25 MG tablet Take 0.5 Vital Signs (Current)   Vitals:    21 1210 21   BP:  134/77   Pulse:  60   Resp:  18   Temp:  96.9 °F (36.1 °C)   TempSrc:  Temporal   SpO2:  96%   Weight: 290 lb (131.5 kg) 290 lb (131.5 kg)   Height: 5' 4\" (1.626 m) 5' 4\" (1.626 m)                                            Vital Signs Statistics (for past 48 hrs)     Temp  Av.9 °F (36.1 °C)  Min: 96.9 °F (36.1 °C)   Min taken time: 21  Max: 96.9 °F (36.1 °C)   Max taken time: 21  Pulse  Av  Min: 61   Min taken time: 21  Max: 61   Max taken time: 21  Resp  Av  Min: 25   Min taken time: 21  Max: 18   Max taken time: 21  BP  Min: 134/77   Min taken time: 21  Max: 134/77   Max taken time: 21  SpO2  Av %  Min: 96 %   Min taken time: 21  Max: 96 %   Max taken time: 21  BP Readings from Last 3 Encounters:   21 134/77   21 130/76   21 110/70       BMI  Body mass index is 49.78 kg/m². Estimated body mass index is 49.78 kg/m² as calculated from the following:    Height as of this encounter: 5' 4\" (1.626 m). Weight as of this encounter: 290 lb (131.5 kg).     CBC   Lab Results   Component Value Date    WBC 10.0 2021    RBC 3.84 2021    HGB 11.4 2021    HCT 34.6 2021    MCV 90.1 2021    RDW 18.4 2021     2021     CMP    Lab Results   Component Value Date     2021    K 3.5 2021     2021    CO2 26 2021    BUN 8 2021    CREATININE 0.7 2021    GFRAA >60 2021    GFRAA >60 2011    AGRATIO 1.1 2021    LABGLOM >60 2021    GLUCOSE 102 2021    PROT 5.8 2021    PROT 6.9 2011    CALCIUM 8.4 2021    BILITOT <0.2 2021    ALKPHOS 70 2021    AST 19 2021    ALT 18 2021     BMP    Lab Results   Component Value Date     2021    K 3.5 2021  04/22/2021    CO2 26 04/22/2021    BUN 8 04/22/2021    CREATININE 0.7 04/22/2021    CALCIUM 8.4 04/22/2021    GFRAA >60 04/22/2021    GFRAA >60 03/16/2011    LABGLOM >60 04/22/2021    GLUCOSE 102 04/22/2021     POCGlucose  No results for input(s): GLUCOSE in the last 72 hours. Coags    Lab Results   Component Value Date    PROTIME 12.7 10/19/2020    INR 1.10 10/19/2020    APTT 55.4 01/63/5008     HCG (If Applicable) No results found for: Elta Nurys, HCG, HCGQUANT   ABGs   Lab Results   Component Value Date    PHART 7.363 09/25/2020    PO2ART 146.4 09/25/2020    FYK1DNY 41.8 09/25/2020    HJD8HRE 23.8 09/25/2020    BEART -2 09/25/2020    X8PYTGYT 99 09/25/2020      Type & Screen (If Applicable)  No results found for: LABABO, LABRH                         BMI: Wt Readings from Last 3 Encounters:       NPO Status:   Date of last liquid consumption: 08/01/21   Time of last liquid consumption: 2300   Date of last solid food consumption: 08/01/21      Time of last solid consumption: 2200       Anesthesia Evaluation  Patient summary reviewed no history of anesthetic complications:   Airway: Mallampati: III  TM distance: >3 FB   Neck ROM: full  Mouth opening: > = 3 FB Dental:      Comment: No loose teeth    Pulmonary:   (+) COPD:  shortness of breath:  sleep apnea:  decreased breath sounds,                             Cardiovascular:    (+) hypertension:, angina:, CAD:, CABG/stent:, dysrhythmias:, CHF:, hyperlipidemia          Rate: normal                    Neuro/Psych:   (+) neuromuscular disease:, psychiatric history:            GI/Hepatic/Renal:   (+) renal disease:, morbid obesity     (-) GERD and PUD       Endo/Other:        (-) diabetes mellitus, hypothyroidism               Abdominal:   (+) obese,           Vascular:   + DVT, .  - PE.       Other Findings:             Anesthesia Plan      MAC     ASA 4     (Spoke with patient and patient's son at some length about patient's comorbidity, specifically his cardiac risk factors. After discussing with surgeon, plan is to use mild sedation and local anesthesia to remove screw from ankle. Patient agrees to proceed with surgery after discussion.)  Induction: intravenous. MIPS: Postoperative opioids intended and Prophylactic antiemetics administered. Anesthetic plan and risks discussed with patient. Plan discussed with CRNA. This pre-anesthesia assessment may be used as a history and physical.    DOS STAFF ADDENDUM:    Pt seen and examined, chart reviewed (including anesthesia, drug and allergy history). No interval changes to history and physical examination. Anesthetic plan, risks, benefits, alternatives, and personnel involved discussed with patient. Patient verbalized an understanding and agrees to proceed.       Nagi Padilla MD  August 2, 2021  6:58 AM

## 2021-08-02 NOTE — PRE-PROCEDURE INSTRUCTIONS
Writer spoke with Jairo at [facility] and patient's nurse Raina, that there is not enough Myoview at this time to complete a nuclear stress test today. The test will be complete in the a.m. on 8/3/21:  Prep as follows:  No caffeine or decaffeinated products after 20:00 this evening. Hold solid food after 05:00 on 8/3/21: may have water as desired / ordered. Please draw Troponin Ts as ordered.   Thank you  St[facility]   [phone]  Electronically signed by Ankush Hernandez RN on 8/2/2021 at 1:29 PM

## 2021-08-02 NOTE — CARE COORDINATION
INITIAL CASE MANAGEMENT ASSESSMENT    Reviewed chart, met with patient to assess possible discharge needs. Explained Case Management role/services. Living Situation: Patient lives with spouse in one story home with ramp to enter. ADLs: manages on his own; retired on Wednesday due to ongoing health issues. DME: ramp, shower chair, home O2    PT/OT Recs: not ordered at this time     Active Services: none     Transportation: active ; spouse or son     Medications: takes on his own    PCP: ANICETO Aviles CNP    PLAN/COMMENTS: Patient plans to return home; no dc needs at this time. Patient lives about one hour away and does not have a ride home today. Rn aware.      Electronically signed by JAE Rowan, ALAINA, Case Management on 8/2/2021 at 12:55 PM  [place] [phone]

## 2021-08-02 NOTE — H&P
Hospital Medicine History & Physical      PCP: Mabel Carrillo, APRN - CNP    Date of Admission: 8/2/2021    Date of Service: Pt seen/examined on 8/2/21 and Placed in Observation. Chief Complaint: Chest pain    History Of Present Illness: The patient is a 64 y.o. [facility]. Patient was in the PACU after surgical intervention for left foot pain due to a painful deep screw. Hardware was removed and shortly thereafter patient came out and was in recovery when he started having chest pain. Patient was given nitro and an EKG was ordered which did not show any acute pathology. Cardiology was consulted and recommended a stress test.  Stress test ordered but unfortunately stress lab ran out of Myoview so it is unable to be performed today. Patient will be admitted to the hospital for further care and intervention. Patient denies any other episodes of chest pain or shortness of breath. He denies any nausea or vomiting associated with the pain and states that it is now completely resolved.     Past Medical History:        Diagnosis Date    Acute diverticulitis 04/10/2021    Acute kidney injury ([address] 04/08/2021    Acute on chronic respiratory failure with hypoxemia (HCC)     Acute respiratory failure ([address] 08/19/2020    Acute respiratory failure with hypoxia ([address]     Anxiety 01/06/2020    Aortic valve calcification 09/2020    seen on lung CT    CAD in native artery 04/14/2021    Chronic obstructive pulmonary disease ([address] 09/03/2019    PFT done 9/03/19    Chronic systolic congestive heart failure ([address] 04/10/2021    Class 3 severe obesity with body mass index (BMI) of 45.0 to 49.9 in adult [facility])     Coronary artery calcification 09/2020    seen on lung ct    Coronary artery disease involving native heart with angina pectoris ([facility] 09/22/2020    Crush injury of right foot 07/23/2015    DDD (degenerative disc disease), lumbar 01/06/2020    Depression 01/06/2020    Diverticulitis of colon 04/10/2021    Erectile dysfunction 01/06/2020    Fatigue 01/06/2020    HNP (herniated nucleus pulposus), lumbar 01/06/2020    Hyperglycemia 01/06/2020    Hyperlipidemia     Hypersomnia 01/06/2020    Hypertension     Insomnia     Ischemic cardiomyopathy     Kidney stone     Lumbar radiculopathy 01/06/2020    Lumbar spine pain 01/06/2020    LVH (left ventricular hypertrophy)     seen on echo 8/2020, moderate    Mobitz type I Wenckebach atrioventricular block 04/10/2021    Moderate protein-calorie malnutrition ([facility] 03/17/2020    Observed sleep apnea 01/06/2020    OANH (obstructive sleep apnea) 01/06/2020    no C-pap is getting tested    Pneumonia, primary atypical     Reactive depression 01/06/2020    S/P three vessel coronary artery bypass 10/26/2020    Spondylosis without myelopathy or radiculopathy, lumbar region 01/06/2020    Tobacco abuse 01/06/2020    Tobacco use 01/06/2020    Wears dentures     full set       Past Surgical History:        Procedure Laterality Date    ANKLE SURGERY Left 8/2/2021    LEFT FOOT SCREW REMOVAL performed by Rafita Hooks MD at [facility] [employer] [facility]  03/2021    CORONARY ARTERY BYPASS GRAFT N/A 9/25/2020    CORONARY ARTERY BYPASS GRAFTING X3, INTERNAL MAMMARY ARTERY, SAPHENOUS VEIN GRAFT, ON PUMP, STERNAL PLATING, 5 LEVEL BILATERAL INTERCOSTAL NERVE BLOCK, PLATELET GEL APPLICATION performed by Ibrahima Adler MD at [address]  03/16/2011    cystoscopy left ureteroscopy, left retrograde, double J stent placement    FINGER AMPUTATION Right 2/2/2021    RIGHT MIDDLE FINGER IRRIGATION AND DEBRIDEMENT WITH REVISION AMPUTATION AND BONE SHORTENING, POSSIBLE NAIL ABLATION performed by Nurys Smith MD at [address] [facility] MANAGEMENT PROCEDURE Right 12/24/2019    RIGHT LUMBAR FIVE SACRAL ONE TRANSFORAMINAL EPIDURAL STEROID INJECTION SITE CONFIRMED BY FLUOROSCOPY performed by Bebo Roberts MD at [facility] Right 1/7/2020    RIGHT LUMBAR FOUR AND LUMBAR FIVE TRANSFORAMINAL EPIDURAL STEROID INJECTION SITE CONFIRMED BY FLUOROSCOPY performed by Bebo Roberts MD at [place] [address]       Medications Prior to Admission:    Prior to Admission medications    Medication Sig Start Date End Date Taking?  Authorizing Provider   cephALEXin (KEFLEX) 500 MG capsule Take 1 capsule by mouth 4 times daily for 3 days 8/2/21 8/5/21 Yes Jr Flores MD   sertraline (ZOLOFT) 100 MG tablet TAKE ONE TABLET BY MOUTH DAILY 7/22/21  Yes ANICETO Campbell CNP   fluticasone-umeclidin-vilant (TRELEGY ELLIPTA) 100-62.5-25 MCG/INH AEPB Inhale 1 puff into the lungs daily 7/19/21  Yes Mao Valle MD   benzonatate (TESSALON) 200 MG capsule TAKE ONE CAPSULE BY MOUTH THREE TIMES A DAY AS NEEDED FOR COUGH 6/21/21  Yes ANICETO Campbell CNP   losartan (COZAAR) 25 MG tablet Take 0.5 tablets by mouth daily 5/17/21  Yes Vianney Montaño MD   OXYGEN Inhale 2 L into the lungs as needed    Yes Historical Provider, MD   meclizine (ANTIVERT) 25 MG tablet Take 1 tablet by mouth 3 times daily as needed for Dizziness 5/5/21  Yes ANICETO Campbell CNP   nitroGLYCERIN (NITROSTAT) 0.4 MG SL tablet Place 1 tablet under the tongue every 5 minutes as needed for Chest pain 4/29/21  Yes Vianney Montaño MD   furosemide (LASIX) 80 MG tablet Take 0.5 tablets by mouth daily 4/20/21 4/20/22 Yes ANICETO Tiwari CNP   metoprolol succinate (TOPROL XL) 25 MG extended release tablet Take 1 tablet by mouth daily 4/20/21  Yes ANICETO Tiwari CNP   ipratropium (ATROVENT) 0.02 % nebulizer solution Take 2.5 mLs by nebulization 3 times daily 3/2/21  Yes ANICETO Campbell CNP   traZODone (DESYREL) 100 MG tablet Take 1 tablet by mouth nightly  Patient taking differently: Take 100 mg by mouth 2 times daily Am pm 3/1/21  Yes ANICETO Amor CNP   clopidogrel (PLAVIX) 75 MG tablet Take 1 tablet by mouth daily 1/13/21  Yes Lise Sotomayor MD   atorvastatin (LIPITOR) 80 MG tablet Take 1 tablet by mouth nightly  Patient taking differently: Take 40 mg by mouth nightly  10/28/20  Yes Lise Sotomayor MD   aspirin 81 MG EC tablet Take 1 tablet by mouth daily 9/30/20  Yes ANICETO Langford CNP   albuterol (PROVENTIL) (2.5 MG/3ML) 0.083% nebulizer solution Take 3 mLs by nebulization every 6 hours as needed for Wheezing or Shortness of Breath 9/25/19  Yes ANICETO Amor CNP   albuterol sulfate HFA (PROVENTIL HFA) 108 (90 Base) MCG/ACT inhaler Inhale 2 puffs into the lungs every 6 hours as needed for Wheezing 5/17/19  Yes Higinio Muir MD       Allergies:  Dilaudid [hydromorphone hcl] and Codeine    Social History:  The patient currently lives at home    TOBACCO:   reports that he quit smoking about 17 months ago. His smoking use included cigarettes. He has a 70.00 pack-year smoking history. He has never used smokeless tobacco.  ETOH:   reports no history of alcohol use. Family History:  Reviewed in detail and negative for DM, Early CAD, Cancer, CVA. Positive as follows:        Problem Relation Age of Onset    Heart Disease Mother     Other Mother         copd    High Blood Pressure Sister     No Known Problems Sister        REVIEW OF SYSTEMS:   Positive for as noted in the HPI. All other systems reviewed and negative. PHYSICAL EXAM:    /81   Pulse 60   Temp 97.4 °F (36.3 °C) (Oral)   Resp 16   Ht 5' 4\" (1.626 m)   Wt 290 lb (131.5 kg)   SpO2 99%   BMI 49.78 kg/m²     General appearance: No apparent distress appears stated age and cooperative, morbidly obese   HEENT Normal cephalic, atraumatic without obvious deformity. Pupils equal, round, and reactive to light. Extra ocular muscles intact. Conjunctivae/corneas clear. Neck: Supple, No jugular venous distention/bruits. Trachea midline without thyromegaly or adenopathy with full range of motion. Lungs: Clear to auscultation, bilaterally with diminished breath sounds bilateral  Heart: Regular rate and rhythm with Normal S1/S2  Abdomen: Soft, non-tender or non-distended without rigidity or guarding and positive bowel sounds all four quadrants. Extremities: No clubbing, cyanosis, or edema bilaterally. Full range of motion without deformity and normal gait intact. Skin: Skin color, texture, turgor normal.  No rashes or lesions. Neurologic: Alert and oriented X 3, neurovascularly intact with sensory/motor intact upper extremities/lower extremities, bilaterally. Cranial nerves: II-XII intact, grossly non-focal.  Mental status: Alert, oriented, thought content appropriate. Capillary Refill: Acceptable  < 3 seconds  Peripheral Pulses: +3 Easily felt, not easily obliterated with pressure      EKG:  I have reviewed the EKG with the following interpretation: Low voltage sinus bradycardia    CBC   No results for input(s): WBC, HGB, HCT, PLT in the last 72 hours. RENAL  No results for input(s): NA, K, CL, CO2, PHOS, BUN, CREATININE in the last 72 hours. Invalid input(s): CA  LFT'S  No results for input(s): AST, ALT, ALB, BILIDIR, BILITOT, ALKPHOS in the last 72 hours. COAG  No results for input(s): INR in the last 72 hours. CARDIAC ENZYMES  No results for input(s): CKTOTAL, CKMB, CKMBINDEX, TROPONINI in the last 72 hours.     U/A:    Lab Results   Component Value Date    NITRITE NEG 03/16/2011    COLORU Yellow 04/08/2021    WBCUA 3-5 03/09/2020    RBCUA 5-10 03/09/2020    MUCUS 3+ 03/09/2020    BACTERIA 1+ 03/09/2020    CLARITYU Clear 04/08/2021    SPECGRAV 1.020 04/08/2021    LEUKOCYTESUR Negative 04/08/2021    BLOODU Negative 04/08/2021    GLUCOSEU Negative 04/08/2021    GLUCOSEU NEGATIVE 03/16/2011       ABG    Lab Results   Component Value Date KRE6MPW 23.8 09/25/2020    BEART -2 09/25/2020    G6RGTQWX 99 09/25/2020    PHART 7.363 09/25/2020    POI2UKA 41.8 09/25/2020    PO2ART 146.4 09/25/2020    HYJ5RSO 25 09/25/2020         PHYSICIANS CERTIFICATION:    I certify that Libia Camilo is expected to be hospitalized for less than 2 midnights based on the following assessment and plan:      ASSESSMENT/PLAN:    Chest pain  Trend troponins  EKG nonacute  Nuclear medicine stress test ordered  Unable to perform stress test today, avoidable day    CAD status post stenting   Continue home medications    Morbid obesity  Counseled on weight loss  BMI 49      DVT Prophylaxis: Lovenox  Diet: Diet NPO  Code Status: Full Code  PT/OT Eval Status: Not applicable    Dispo -observation       Brennan Felty, MD    Thank you ANICETO Hollis - ROXANA for the opportunity to be involved in this patient's care. If you have any questions or concerns please feel free to contact me at [phone].

## 2021-08-02 NOTE — PROGRESS NOTES
Perfect serve message sent to Dr. Franklyn Stevens because pt is rating chest pain 8/10. Dr. Franklyn Stevens said ok to give SL nitro.  Electronically signed by Danilo Light RN on 8/2/2021 at 5:46 PM

## 2021-08-02 NOTE — PROGRESS NOTES
C/O chest upon arrival to PACU. Call to Dr Mohsen Cary. O2 order, stat EKG ordered, Morphine 2mg ivp stat per Dr Mohsen Cary. Pt on monitors. Dr Mohsen Cary at bedside. Will proceed to monitor.

## 2021-08-02 NOTE — ANESTHESIA POSTPROCEDURE EVALUATION
Department of Anesthesiology  Postprocedure Note    Patient: Phillip Ruano  MRN: [id number]  YOB: 1960  Date of evaluation: 8/2/2021  Time:  5:17 PM     Procedure Summary     Date: 08/02/21 Room / Location: [facility] [address] [place] [address]    Anesthesia Start: 9438 Anesthesia Stop: 7625    Procedure: LEFT FOOT SCREW REMOVAL (Left Foot) Diagnosis: (LEFT FOOT PAINFUL HARDWARE)    Surgeons: Ezequiel Robert MD Responsible Provider: Ju Loving MD    Anesthesia Type: MAC ASA Status: 4          Anesthesia Type: MAC    Adele Phase I: Adele Score: 8    Adele Phase II:      Last vitals: Reviewed and per EMR flowsheets.        Anesthesia Post Evaluation    Patient location during evaluation: PACU  Patient participation: complete - patient participated  Level of consciousness: awake and alert  Pain score: 3  Airway patency: patent  Nausea & Vomiting: no nausea and no vomiting  Complications: no  Cardiovascular status: blood pressure returned to baseline  Respiratory status: acceptable  Hydration status: euvolemic

## 2021-08-02 NOTE — PROGRESS NOTES
Pt transferred to floor from PACU, alert and oriented x 4. Will cont to monitor and reassess.  Electronically signed by Ashley Yun RN on 8/2/2021 at 11:04 AM

## 2021-08-02 NOTE — CONSULTS
[facility]  Cardiology Consult Note        CC:      Chest pain      HPI:   This is a 64 y.o. male who has history of coronary artery bypass surgery and recent stenting of the left main into the LAD and diagonal who came to the hospital for minor surgery to the left foot for a painful deep screw. Hardware was removed and shortly thereafter patient came out and was in recovery when he started having chest pain apparently the patient got nitroglycerin. EKG done showed no acute abnormality. I went to see the patient upstairs but he was in deep sleep appeared to be quite comfortable.   I reviewed his EKG which was completely normal      Past Medical History:   Diagnosis Date    Acute diverticulitis 04/10/2021    Acute kidney injury ([address] 04/08/2021    Acute on chronic respiratory failure with hypoxemia (HCC)     Acute respiratory failure ([address] 08/19/2020    Acute respiratory failure with hypoxia ([address]     Anxiety 01/06/2020    Aortic valve calcification 09/2020    seen on lung CT    CAD in native artery 04/14/2021    Chronic obstructive pulmonary disease ([address] 09/03/2019    PFT done 9/03/19    Chronic systolic congestive heart failure ([address] 04/10/2021    Class 3 severe obesity with body mass index (BMI) of 45.0 to 49.9 in adult [facility])     Coronary artery calcification 09/2020    seen on lung ct    Coronary artery disease involving native heart with angina pectoris ([address] 09/22/2020    Crush injury of right foot 07/23/2015    DDD (degenerative disc disease), lumbar 01/06/2020    Depression 01/06/2020    Diverticulitis of colon 04/10/2021    Erectile dysfunction 01/06/2020    Fatigue 01/06/2020    HNP (herniated nucleus pulposus), lumbar 01/06/2020    Hyperglycemia 01/06/2020    Hyperlipidemia     Hypersomnia 01/06/2020    Hypertension     Insomnia     Ischemic cardiomyopathy     Kidney stone     Lumbar radiculopathy 01/06/2020    Lumbar spine pain 01/06/2020    LVH (left ventricular hypertrophy)     seen on echo 8/2020, moderate    Mobitz type I Wenckebach atrioventricular block 04/10/2021    Moderate protein-calorie malnutrition ([address] 03/17/2020    Observed sleep apnea 01/06/2020    OANH (obstructive sleep apnea) 01/06/2020    no C-pap is getting tested    Pneumonia, primary atypical     Reactive depression 01/06/2020    S/P three vessel coronary artery bypass 10/26/2020    Spondylosis without myelopathy or radiculopathy, lumbar region 01/06/2020    Tobacco abuse 01/06/2020    Tobacco use 01/06/2020    Wears dentures     full set      Past Surgical History:   Procedure Laterality Date    ANKLE SURGERY Left 8/2/2021    LEFT FOOT SCREW REMOVAL performed by Susan Mendoza MD at [address]  03/2021    CORONARY ARTERY BYPASS GRAFT N/A 9/25/2020    CORONARY ARTERY BYPASS GRAFTING X3, INTERNAL MAMMARY ARTERY, SAPHENOUS VEIN GRAFT, ON PUMP, STERNAL PLATING, 5 LEVEL BILATERAL INTERCOSTAL NERVE BLOCK, PLATELET GEL APPLICATION performed by Silvia Villanueva MD at [facility]  03/16/2011    cystoscopy left ureteroscopy, left retrograde, double J stent placement    FINGER AMPUTATION Right 2/2/2021    RIGHT MIDDLE FINGER IRRIGATION AND DEBRIDEMENT WITH REVISION AMPUTATION AND BONE SHORTENING, POSSIBLE NAIL ABLATION performed by Steff Jamil MD at [facility] Right 12/24/2019    RIGHT LUMBAR FIVE SACRAL ONE TRANSFORAMINAL EPIDURAL STEROID INJECTION SITE CONFIRMED BY FLUOROSCOPY performed by Evi Browning MD at [facility] [facility] Right 1/7/2020    RIGHT LUMBAR FOUR AND LUMBAR FIVE TRANSFORAMINAL EPIDURAL STEROID INJECTION SITE CONFIRMED BY FLUOROSCOPY performed by Evi Browning MD at [place] [address]      Family History   Problem Relation Age of Onset    Heart Disease Mother     Other Mother         copd    High Blood Pressure Sister     No Known Problems Sister       Social History     Tobacco Use    Smoking status: Former Smoker     Packs/day: 2.00     Years: 35.00     Pack years: 70.00     Types: Cigarettes     Quit date: 3/1/2020     Years since quittin.4    Smokeless tobacco: Never Used   Vaping Use    Vaping Use: Never used   Substance Use Topics    Alcohol use: No    Drug use: Not Currently     Types: Marijuana, Cocaine     Comment: hx of drug use 30 yrs ago     Allergies   Allergen Reactions    Dilaudid [Hydromorphone Hcl] Nausea And Vomiting    Codeine Itching      sodium chloride flush  5-40 mL Intravenous 2 times per day    [START ON 8/3/2021] aspirin  81 mg Oral Daily    atorvastatin  40 mg Oral Nightly    enoxaparin  40 mg Subcutaneous Nightly    clopidogrel  75 mg Oral Daily    furosemide  40 mg Oral Daily    sertraline  100 mg Oral Daily    traZODone  100 mg Oral BID    [START ON 8/3/2021] budesonide-formoterol  2 puff Inhalation BID    And    [START ON 8/3/2021] tiotropium  2 puff Inhalation Daily    [START ON 8/3/2021] losartan  12.5 mg Oral Daily         Intake/Output Summary (Last 24 hours) at 2021 1435  Last data filed at 2021 0743  Gross per 24 hour   Intake 300 ml   Output 0 ml   Net 300 ml       Physical Examination:  /81   Pulse 60   Temp 97.4 °F (36.3 °C) (Oral)   Resp 16   Ht 5' 4\" (1.626 m)   Wt 290 lb (131.5 kg)   SpO2 99%   BMI 49.78 kg/m²        Lab Results   Component Value Date    HDL 35 2021    LDLCALC 33 2021    TRIG 173 2020     No results for input(s): AST, ALT, LABALBU in the last 72 hours. EKG:   Sinus rhythm with no ischemic changes    Chest X-Ray:      ECHO:   Limited echo for left ventricle systolic function. Definity is used for   myocardial border enhancement. LV systolic function is mildly reduced with a visually estimated EF=45-50%. The left ventricle is normal in size with normal wall thickness.    More prominent Inferior HK on certain single drug-eluting stent   PCI of left main/LAD with single drug-eluting stent           ASSESSMENT AND PLAN:      43-year-old gentleman with history of previous CABG with 2 out of 3 bypass grafts occluded. The LIMA graft to the LAD is patent however he had a recent stent involving the left main and feeding into the diagonal.  His circumflex is chronically occluded. Patient apparently had chest pain in recovery. EKG did not show any ischemic ST changes    The patient appears to be quite comfortable now  I did not wake him up from sleep    In my opinion the patient does not need a stress test given the fact that he recently had primary angiogram and intervention.   It is possible he had angina,  which can be treated with antianginal meds    I will therefore cancel the stress test    Tamar Rivera M.D  8/2/2021

## 2021-08-02 NOTE — PROGRESS NOTES
Pt out from or c/o chest pain, radiating to left arm and mid back and overall \" not feeling well\". Pt noted with extensive cardiac history. Joe called, NON for 12 lead. Pt vss, sat 100% on 3L nc however pt c/o SOB, placed on NRB for extra support. Wob wnl. 12 lead in place now at bedside.  Bennet to bedside to eval pt

## 2021-08-02 NOTE — SIGNIFICANT EVENT
Placed order for a nuclear medicine stress test at 9:55 AM and the order was released by nursing at 11:25 AM.  Called by nursing stating that nuclear medicine unable to perform stress test at 1 PM.  Called down to nuclear stress test stating that patient okay to go down for stress as was previously discussed with cardiology. Informed at 1:25 PM that this is not possible for today. This is delaying patient's discharge. Stress test is the only thing keeping patient in the hospital and today will be viewed as an avoidable day.     Lida Kelly MD

## 2021-08-02 NOTE — PROGRESS NOTES
Perfect serve message sent to Dr. Wang Way. Pt states pain is 10/10 stabbing pain in chest and back. Nitro given but not helping. Will give morphine per order.  Electronically signed by Linda Ruvalcaba RN on 8/2/2021 at 6:09 PM

## 2021-08-03 ENCOUNTER — APPOINTMENT (OUTPATIENT)
Dept: GENERAL RADIOLOGY | Age: 61
End: 2021-08-03
Attending: ORTHOPAEDIC SURGERY
Payer: COMMERCIAL

## 2021-08-03 VITALS
TEMPERATURE: 98.4 F | OXYGEN SATURATION: 92 % | BODY MASS INDEX: 49.12 KG/M2 | DIASTOLIC BLOOD PRESSURE: 58 MMHG | HEIGHT: 64 IN | HEART RATE: 80 BPM | WEIGHT: 287.7 LBS | SYSTOLIC BLOOD PRESSURE: 100 MMHG | RESPIRATION RATE: 17 BRPM

## 2021-08-03 LAB
EKG ATRIAL RATE: 58 BPM
EKG ATRIAL RATE: 59 BPM
EKG ATRIAL RATE: 80 BPM
EKG DIAGNOSIS: NORMAL
EKG P AXIS: 41 DEGREES
EKG P AXIS: 43 DEGREES
EKG P AXIS: 44 DEGREES
EKG P-R INTERVAL: 192 MS
EKG P-R INTERVAL: 196 MS
EKG P-R INTERVAL: 200 MS
EKG Q-T INTERVAL: 366 MS
EKG Q-T INTERVAL: 382 MS
EKG Q-T INTERVAL: 408 MS
EKG QRS DURATION: 74 MS
EKG QRS DURATION: 74 MS
EKG QRS DURATION: 76 MS
EKG QTC CALCULATION (BAZETT): 374 MS
EKG QTC CALCULATION (BAZETT): 403 MS
EKG QTC CALCULATION (BAZETT): 422 MS
EKG R AXIS: -30 DEGREES
EKG R AXIS: -42 DEGREES
EKG R AXIS: -9 DEGREES
EKG T AXIS: 65 DEGREES
EKG T AXIS: 74 DEGREES
EKG T AXIS: 77 DEGREES
EKG VENTRICULAR RATE: 58 BPM
EKG VENTRICULAR RATE: 59 BPM
EKG VENTRICULAR RATE: 80 BPM
TROPONIN: <0.01 NG/ML

## 2021-08-03 PROCEDURE — 96366 THER/PROPH/DIAG IV INF ADDON: CPT

## 2021-08-03 PROCEDURE — 6360000002 HC RX W HCPCS: Performed by: INTERNAL MEDICINE

## 2021-08-03 PROCEDURE — 6370000000 HC RX 637 (ALT 250 FOR IP): Performed by: INTERNAL MEDICINE

## 2021-08-03 PROCEDURE — 84484 ASSAY OF TROPONIN QUANT: CPT

## 2021-08-03 PROCEDURE — 6370000000 HC RX 637 (ALT 250 FOR IP): Performed by: NURSE PRACTITIONER

## 2021-08-03 PROCEDURE — G0378 HOSPITAL OBSERVATION PER HR: HCPCS

## 2021-08-03 PROCEDURE — 94761 N-INVAS EAR/PLS OXIMETRY MLT: CPT

## 2021-08-03 PROCEDURE — 96376 TX/PRO/DX INJ SAME DRUG ADON: CPT

## 2021-08-03 PROCEDURE — 93010 ELECTROCARDIOGRAM REPORT: CPT | Performed by: INTERNAL MEDICINE

## 2021-08-03 PROCEDURE — 2700000000 HC OXYGEN THERAPY PER DAY

## 2021-08-03 PROCEDURE — 93005 ELECTROCARDIOGRAM TRACING: CPT | Performed by: INTERNAL MEDICINE

## 2021-08-03 PROCEDURE — 74018 RADEX ABDOMEN 1 VIEW: CPT

## 2021-08-03 PROCEDURE — 36415 COLL VENOUS BLD VENIPUNCTURE: CPT

## 2021-08-03 PROCEDURE — 6360000002 HC RX W HCPCS: Performed by: ORTHOPAEDIC SURGERY

## 2021-08-03 PROCEDURE — 2580000003 HC RX 258: Performed by: INTERNAL MEDICINE

## 2021-08-03 RX ORDER — PANTOPRAZOLE SODIUM 20 MG/1
20 TABLET, DELAYED RELEASE ORAL
Status: DISCONTINUED | OUTPATIENT
Start: 2021-08-03 | End: 2021-08-03 | Stop reason: HOSPADM

## 2021-08-03 RX ADMIN — SODIUM CHLORIDE 25 ML: 9 INJECTION, SOLUTION INTRAVENOUS at 06:49

## 2021-08-03 RX ADMIN — SODIUM CHLORIDE, PRESERVATIVE FREE 10 ML: 5 INJECTION INTRAVENOUS at 09:49

## 2021-08-03 RX ADMIN — Medication 3000 MG: at 06:50

## 2021-08-03 RX ADMIN — MORPHINE SULFATE 2 MG: 2 INJECTION, SOLUTION INTRAMUSCULAR; INTRAVENOUS at 06:44

## 2021-08-03 RX ADMIN — ONDANSETRON 4 MG: 2 INJECTION INTRAMUSCULAR; INTRAVENOUS at 06:44

## 2021-08-03 RX ADMIN — ONDANSETRON 4 MG: 2 INJECTION INTRAMUSCULAR; INTRAVENOUS at 11:59

## 2021-08-03 ASSESSMENT — PAIN DESCRIPTION - PROGRESSION
CLINICAL_PROGRESSION: GRADUALLY WORSENING
CLINICAL_PROGRESSION: NOT CHANGED
CLINICAL_PROGRESSION: GRADUALLY WORSENING
CLINICAL_PROGRESSION: NOT CHANGED
CLINICAL_PROGRESSION: GRADUALLY WORSENING
CLINICAL_PROGRESSION: GRADUALLY WORSENING
CLINICAL_PROGRESSION: GRADUALLY IMPROVING
CLINICAL_PROGRESSION: GRADUALLY WORSENING
CLINICAL_PROGRESSION: GRADUALLY WORSENING

## 2021-08-03 ASSESSMENT — PAIN DESCRIPTION - DIRECTION
RADIATING_TOWARDS: BACK
RADIATING_TOWARDS: BACK

## 2021-08-03 ASSESSMENT — PAIN DESCRIPTION - DESCRIPTORS
DESCRIPTORS: ACHING

## 2021-08-03 ASSESSMENT — PAIN DESCRIPTION - FREQUENCY
FREQUENCY: CONTINUOUS

## 2021-08-03 ASSESSMENT — PAIN DESCRIPTION - ONSET
ONSET: ON-GOING

## 2021-08-03 ASSESSMENT — PAIN SCALES - GENERAL
PAINLEVEL_OUTOF10: 0
PAINLEVEL_OUTOF10: 8
PAINLEVEL_OUTOF10: 3
PAINLEVEL_OUTOF10: 10
PAINLEVEL_OUTOF10: 0
PAINLEVEL_OUTOF10: 7
PAINLEVEL_OUTOF10: 8

## 2021-08-03 ASSESSMENT — PAIN SCALES - WONG BAKER
WONGBAKER_NUMERICALRESPONSE: 10
WONGBAKER_NUMERICALRESPONSE: 10
WONGBAKER_NUMERICALRESPONSE: 2
WONGBAKER_NUMERICALRESPONSE: 10
WONGBAKER_NUMERICALRESPONSE: 8
WONGBAKER_NUMERICALRESPONSE: 10
WONGBAKER_NUMERICALRESPONSE: 8

## 2021-08-03 ASSESSMENT — PAIN DESCRIPTION - PAIN TYPE
TYPE: ACUTE PAIN

## 2021-08-03 ASSESSMENT — PAIN - FUNCTIONAL ASSESSMENT

## 2021-08-03 ASSESSMENT — PAIN DESCRIPTION - ORIENTATION
ORIENTATION: MID

## 2021-08-03 ASSESSMENT — PAIN DESCRIPTION - LOCATION
LOCATION: CHEST

## 2021-08-03 NOTE — PROGRESS NOTES
Pt is alert and oriented x 4. Pt is rating pain in chest 8/10. Pt had an episode of emesis this morning and is complaining of nausea. Will give PRN antiemetic per order. Pt is up with a standby assist, 2/4 bed rails up, bed in lowest position, fall precautions in place, call light within reach. Will cont to monitor and reassess.  Electronically signed by Liz Bergman RN on 8/3/2021 at 10:26 AM

## 2021-08-03 NOTE — PLAN OF CARE
Problem: Pain:  Goal: Pain level will decrease  Description: Pain level will decrease  8/3/2021 1239 by Rahul Mcgregor RN  Outcome: Ongoing  Note: Pt assessed for pain. Pt in pain and assessed with 0-10 pain rating scale. Pt given prescribed analgesic for pain. (See eMar) Pt satisfied with pain relief thus far. Will reassess and continue to monitor. Problem: Pain:  Goal: Control of acute pain  Description: Control of acute pain  8/3/2021 1239 by Rahul Mcgregor RN  Outcome: Ongoing  Note: Pt assessed for pain. Pt in pain and assessed with 0-10 pain rating scale. Pt given prescribed analgesic for pain. (See eMar) Pt satisfied with pain relief thus far. Will reassess and continue to monitor. Problem: Pain:  Goal: Control of chronic pain  Description: Control of chronic pain  8/3/2021 1239 by Rahul Mcgregor RN  Outcome: Ongoing  Note: Pt assessed for pain. Pt in pain and assessed with 0-10 pain rating scale. Pt given prescribed analgesic for pain. (See eMar) Pt satisfied with pain relief thus far. Will reassess and continue to monitor. Problem: Falls - Risk of:  Goal: Will remain free from falls  Description: Will remain free from falls  8/3/2021 1239 by Rahul Mcgregor RN  Outcome: Ongoing  Note: Fall risk assessment completed. Fall precautions in place. Bed in lowest position, wheels locked, bed/chair exit alarm in place, call light within reach, and non skid footwear on. Walkway free of clutter. Pt alert and oriented and able to make needs known. Pt educated to use call light when needing to get up, and pt utilizes call light to make needs known. Will continue to monitor. Problem: Falls - Risk of:  Goal: Absence of physical injury  Description: Absence of physical injury  8/3/2021 1239 by Rahul Mcgregor RN  Outcome: Ongoing  Note: Pt is free of injury. No injury noted. Fall precautions in place. Call light within reach. Will monitor.

## 2021-08-03 NOTE — PROGRESS NOTES
[facility]   Progress Note      S/P :  SUBJECTIVE  In bed. Awakened to name. . Pain is not  described in left foot. \"I feel sick and just threw up\"Denies hx of N/V postop in the past. Pt was able to get out of bed and walk to bathroom to void with no assistance and very steady on feet while I was in room. OBJECTIVE              Physical                      VITALS:  BP (!) 145/83   Pulse 92   Temp 97.8 °F (36.6 °C) (Oral)   Resp 20   Ht 5' 4\" (1.626 m)   Wt 287 lb 11.2 oz (130.5 kg)   SpO2 92%   BMI 49.38 kg/m²                     MUSCULOSKELETAL:  left foot NVI. Wiggles toes to command. Pedal pulses are palpable. NEUROLOGIC:                                  Sensory:  Touch:  Left Lower Extremity:  normal                                                 Surgical wound appears clean and dry, Dressing changed to Mepilex. Wound well approximated left medial ankle with steristrips noted.      Data       CBC:   Lab Results   Component Value Date    WBC 11.1 08/02/2021    RBC 5.19 08/02/2021    HGB 14.1 08/02/2021    HCT 43.2 08/02/2021    MCV 83.2 08/02/2021    MCH 27.1 08/02/2021    MCHC 32.5 08/02/2021    RDW 15.0 08/02/2021     08/02/2021    MPV 8.0 08/02/2021        WBC:    Lab Results   Component Value Date    WBC 11.1 08/02/2021        Hemoglobin/Hematocrit:    Lab Results   Component Value Date    HGB 14.1 08/02/2021    HCT 43.2 08/02/2021        PT/INR:    Lab Results   Component Value Date    PROTIME 12.7 10/19/2020    INR 1.10 10/19/2020              Current Inpatient Medications             Current Facility-Administered Medications: pantoprazole (PROTONIX) tablet 20 mg, 20 mg, Oral, QAM AC  nitroGLYCERIN (NITROSTAT) SL tablet 0.4 mg, 0.4 mg, Sublingual, Q5 Min PRN  sodium chloride flush 0.9 % injection 5-40 mL, 5-40 mL, Intravenous, 2 times per day  sodium chloride flush 0.9 % injection 5-40 mL, 5-40 mL, Intravenous, PRN  0.9 % sodium chloride infusion, 25 mL, Intravenous, PRN  ondansetron (ZOFRAN-ODT) disintegrating tablet 4 mg, 4 mg, Oral, Q8H PRN **OR** ondansetron (ZOFRAN) injection 4 mg, 4 mg, Intravenous, Q6H PRN  acetaminophen (TYLENOL) tablet 650 mg, 650 mg, Oral, Q6H PRN **OR** acetaminophen (TYLENOL) suppository 650 mg, 650 mg, Rectal, Q6H PRN  polyethylene glycol (GLYCOLAX) packet 17 g, 17 g, Oral, Daily PRN  aspirin chewable tablet 81 mg, 81 mg, Oral, Daily  atorvastatin (LIPITOR) tablet 40 mg, 40 mg, Oral, Nightly  enoxaparin (LOVENOX) injection 40 mg, 40 mg, Subcutaneous, Nightly  albuterol (PROVENTIL) nebulizer solution 2.5 mg, 2.5 mg, Nebulization, Q6H PRN  clopidogrel (PLAVIX) tablet 75 mg, 75 mg, Oral, Daily  furosemide (LASIX) tablet 40 mg, 40 mg, Oral, Daily  sertraline (ZOLOFT) tablet 100 mg, 100 mg, Oral, Daily  traZODone (DESYREL) tablet 100 mg, 100 mg, Oral, BID  budesonide-formoterol (SYMBICORT) 160-4.5 MCG/ACT inhaler 2 puff, 2 puff, Inhalation, BID **AND** tiotropium (SPIRIVA RESPIMAT) 2.5 MCG/ACT inhaler 2 puff, 2 puff, Inhalation, Daily  losartan (COZAAR) tablet 12.5 mg, 12.5 mg, Oral, Daily  morphine (PF) injection 2 mg, 2 mg, Intravenous, Q2H PRN    ASSESSMENT AND PLAN    Post removal hardware left ankle. Stable exam  Chest pain, eval and workup per hospitalist and cardiology  WBAT left foot  Nausea and vomiting, stomach ache. Gave Zofran and Protonix IV  Morbid obesity  Home today if hospitalist and cardiology agree.      ANICETO Goyal - CNP  8/3/2021  9:41 AM

## 2021-08-03 NOTE — PROGRESS NOTES
Pt is alert and oriented x4, resting quietly in bed. Afternoon medications and intake tolerated well. No complaints of nausea or vomiting. Pt complaining of a pain of 6 out of 10, so this nurse gave him 2mg of morphine. Dressing remains clean, dry, and intact. Call light within reach. No other needs made known at this time. Fall precautions in place. Will continue to monitor.  Electronically signed by Iqra Louie RN on 8/2/2021 at 8:21 PM

## 2021-08-03 NOTE — PROGRESS NOTES
1602: Blood pressure taken at via monitor was 88/46. Perfect serve message sent to Dr. Moe Coronel to confirm pt was still ok for discharge with that BP. Dr. Moe Coronel reached out to cardio and they confirmed with him that pt was ok for discharge from their standpoint. 1630: Pt denies nausea. Pt states he was able to eat a little bit of his dinner and did not throw up. Pt denies chest pain at this time. Pt states he feels comfortable with discharge at this time. Manual BP taken by me at this time is 105-65.     1650: IV removed no complications. Pt is transported via wheelchair out of hospital to home with documented belongings and prescriptions.      Electronically signed by Manjit Mariano RN on 8/3/2021 at 5:49 PM

## 2021-08-03 NOTE — PLAN OF CARE
Problem: Pain:  Goal: Pain level will decrease  Description: Pain level will decrease  8/3/2021 0050 by Tracie Montez RN  Outcome: Ongoing  8/2/2021 2007 by Alessandro Carson RN  Outcome: Ongoing  Note: Pt assessed for pain. Pt in pain and assessed with 0-10 pain rating scale. Pt given prescribed analgesic for pain. (See eMar) Pt satisfied with pain relief thus far. Will reassess and continue to monitor. Goal: Control of acute pain  Description: Control of acute pain  8/3/2021 0050 by Tracie Montez RN  Outcome: Ongoing  8/2/2021 2007 by Alessandro Carson RN  Outcome: Ongoing  Note: Pt assessed for pain. Pt in pain and assessed with 0-10 pain rating scale. Pt given prescribed analgesic for pain. (See eMar) Pt satisfied with pain relief thus far. Will reassess and continue to monitor. Goal: Control of chronic pain  Description: Control of chronic pain  8/3/2021 0050 by Tracie Montez RN  Outcome: Ongoing  8/2/2021 2007 by Alessandro Carson RN  Outcome: Ongoing  Note: Pt assessed for pain. Pt in pain and assessed with 0-10 pain rating scale. Pt given prescribed analgesic for pain. (See eMar) Pt satisfied with pain relief thus far. Will reassess and continue to monitor.

## 2021-08-03 NOTE — OP NOTE
[address] [place] [address]                                OPERATIVE REPORT    PATIENT NAME: Zenaida Mclaughlin                    :        1960  MED REC NO:   [id number]                          ROOM:       3109  ACCOUNT NO:   [de-identified]                           ADMIT DATE: 2021  PROVIDER:     Elida Ariza MD    DATE OF PROCEDURE:  2021    PRIMARY CARE:  Viral Newman CNP    PREOPERATIVE DIAGNOSIS:  Left foot retained painful deep implant/screw. POSTOPERATIVE DIAGNOSIS:  Left foot retained painful deep implant/screw. OPERATION PERFORMED:  Removal of deep implant, left foot old deep screw. SURGEON:  Elida Ariza MD    ASSISTANT:  Katie Mccartney CNP    ANESTHESIA:  Local MAC. ESTIMATED BLOOD LOSS:  Minimal.     COMPLICATIONS:  None. TOURNIQUET:  Left upper calf, never inflated. INDICATIONS:  This is a 44-year-old white male who presented to our  office complaining of a painful deep implant/prominent screw. He had  the surgery about 35 years ago. All risks, benefits, and alternatives  were discussed with the patient. He agreed to proceed with surgical  treatment. Given the patient's Body mass index is 49.38 kg/m². added significant challenge to the procedure. It required significant physical and mental effort. It required 50% more time for such procedure. OPERATIVE PROCEDURE:  The patient's left foot was marked. He received 3  gm of Ancef IV preoperatively given his size with BMI of almost 50. The  patient was then brought to the operating room and underwent local MAC. A well-padded tourniquet was placed, left upper calf. The left lower  extremity was then prepped and draped in regular sterile routine  fashion. A time-out was called confirming the patient's name, site, and  procedure. We made a small incision over the painful prominent hardware.   Careful  dissection was performed. There was a scar tissue covering the screw. With careful dissection, we were able to expose the screw head. We then  used a hemostat and were able to unscrew the screw and then it was  removed. At this point, we irrigated the incision copiously with normal  saline. We then closed the incision with a 3-0 Vicryl and a 4-0  Monocryl. Steri-Strips were then applied. Dressing was then applied in  the form of Xeroform, 4x4, sterile Webril, and Ace wrap. The patient tolerated the procedure well and was taken to the Recovery  in stable condition. Donald Jhaveri CNP was 1st Assist given the nature of the procedure that needed advanced assistance. POSTOPERATIVE PLAN:  The patient will be discharged home. He can be weightbearing as tolerated, no heavy-impact activities for two  weeks. Jessica Coronado MD    D: 08/03/2021 6:49:34       T: 08/03/2021 9:32:11     [id number]  Job#: [id number]     Doc#: [id number]    CC:   Marina Green NP

## 2021-08-03 NOTE — DISCHARGE SUMMARY
Hospital Medicine Discharge Summary    Patient ID: Sammi Harrington      Patient's PCP: Erasmo Hampton, APRN - CNP    Admit Date: 8/2/2021     Discharge Date:   8/3/21    Admitting Physician: Raffi Chao MD     Discharge Physician: Micah Dumont MD     Discharge Diagnoses: Active Hospital Problems    Diagnosis Date Noted    Chest pain [R07.9] 08/08/2019       The patient was seen and examined on day of discharge and this discharge summary is in conjunction with any daily progress note from day of discharge. Hospital Course: The patient is a 64 y.o. male who presents to [facility] with chest pain. Patient was in the PACU after surgical intervention for left foot pain due to a painful deep screw. Hardware was removed and shortly thereafter patient came out and was in recovery when he started having chest pain. Patient was given nitro and an EKG was ordered which did not show any acute pathology. Initially patient was going to have a stress test but after reviewing the EKG along with repeated troponins it was decided to cancel. Cardiology recommended continued care with medical management patient denies any other episodes of chest pain or shortness of breath. He denies any nausea or vomiting associated with the pain and states that it is now completely resolved. Overnight patient was fine and then started having some nausea and vomiting the following day. Patient was given supportive care and symptoms resolved. KUB performed to rule out any sort of small bowel obstruction and came back negative.   Patient is stable for discharge home with follow-up in the outpatient with his primary cardiologist.    Chest pain  Trend troponins, negative x2  EKG nonacute  Cardiology consulted and did not recommend stress test     CAD status post stenting   Continue home medications     Morbid obesity  Counseled on weight loss  BMI 49     Nausea and vomiting  Continue supportive care  KUB negative      Exam:     BP (!) 130/91   Pulse 95   Temp 100.9 °F (38.3 °C) (Oral)   Resp 17   Ht 5' 4\" (1.626 m)   Wt 287 lb 11.2 oz (130.5 kg)   SpO2 97%   BMI 49.38 kg/m²     General appearance: No apparent distress, appears stated age and cooperative, obese  HEENT: Pupils equal, round, and reactive to light. Conjunctivae/corneas clear. Neck: Supple, with full range of motion. No jugular venous distention. Trachea midline. Respiratory:  Normal respiratory effort. Clear to auscultation, bilaterally without Rales/Wheezes/Rhonchi. Cardiovascular: Regular rate and rhythm with normal S1/S2 without murmurs, rubs or gallops. Abdomen: Soft, non-tender, non-distended with normal bowel sounds. Musculoskeletal: No clubbing, cyanosis or edema bilaterally. Full range of motion without deformity. Skin: Skin color, texture, turgor normal.  No rashes or lesions. Neurologic:  Neurovascularly intact without any focal sensory/motor deficits. Cranial nerves: II-XII intact, grossly non-focal.  Psychiatric: Alert and oriented, thought content appropriate, normal insight      Consults:     IP CONSULT TO HOSPITALIST  IP CONSULT TO CARDIOLOGY  IP CONSULT TO RESPIRATORY CARE    Significant Diagnostic Studies:     XR ABDOMEN (KUB) (SINGLE AP VIEW)    (Results Pending)       Disposition: Home    Condition at Discharge: Stable    Discharge Instructions/Follow-up: PCP, cardiology    Code Status:  Full Code     Activity: activity as tolerated    Diet: cardiac diet      Labs:  For convenience and continuity at follow-up the following most recent labs are provided:      CBC:    Lab Results   Component Value Date    WBC 11.1 08/02/2021    HGB 14.1 08/02/2021    HCT 43.2 08/02/2021     08/02/2021       Renal:    Lab Results   Component Value Date     08/02/2021    K 4.1 08/02/2021     08/02/2021    CO2 29 08/02/2021    BUN 13 08/02/2021    CREATININE 0.8 08/02/2021    CALCIUM 8.9 08/02/2021    PHOS 3.6 08/21/2020 Discharge Medications:     Current Discharge Medication List           Details   cephALEXin (KEFLEX) 500 MG capsule Take 1 capsule by mouth 4 times daily for 3 days  Qty: 12 capsule, Refills: 0              Details   sertraline (ZOLOFT) 100 MG tablet TAKE ONE TABLET BY MOUTH DAILY  Qty: 30 tablet, Refills: 2    Associated Diagnoses: Reactive depression;  Anxiety      fluticasone-umeclidin-vilant (TRELEGY ELLIPTA) 100-62.5-25 MCG/INH AEPB Inhale 1 puff into the lungs daily  Qty: 60 each, Refills: 3    Associated Diagnoses: Chronic obstructive pulmonary disease, unspecified COPD type (Former[facility])      benzonatate (TESSALON) 200 MG capsule TAKE ONE CAPSULE BY MOUTH THREE TIMES A DAY AS NEEDED FOR COUGH  Qty: 30 capsule, Refills: 0      losartan (COZAAR) 25 MG tablet Take 0.5 tablets by mouth daily  Qty: 30 tablet, Refills: 5    Associated Diagnoses: Acute diastolic congestive heart failure (Former[facility])      OXYGEN Inhale 2 L into the lungs as needed       meclizine (ANTIVERT) 25 MG tablet Take 1 tablet by mouth 3 times daily as needed for Dizziness  Qty: 30 tablet, Refills: 0    Associated Diagnoses: Dizziness      nitroGLYCERIN (NITROSTAT) 0.4 MG SL tablet Place 1 tablet under the tongue every 5 minutes as needed for Chest pain  Qty: 25 tablet, Refills: 3      furosemide (LASIX) 80 MG tablet Take 0.5 tablets by mouth daily  Qty: 45 tablet, Refills: 3      metoprolol succinate (TOPROL XL) 25 MG extended release tablet Take 1 tablet by mouth daily  Qty: 30 tablet, Refills: 11      ipratropium (ATROVENT) 0.02 % nebulizer solution Take 2.5 mLs by nebulization 3 times daily  Qty: 225 mL, Refills: 3    Associated Diagnoses: Chronic obstructive pulmonary disease, unspecified COPD type ([facility])      traZODone (DESYREL) 100 MG tablet Take 1 tablet by mouth nightly  Qty: 30 tablet, Refills: 5    Associated Diagnoses: Primary insomnia; Reactive depression      clopidogrel (PLAVIX) 75 MG tablet Take 1 tablet by mouth daily  Qty: 90 tablet, Refills: 3      atorvastatin (LIPITOR) 80 MG tablet Take 1 tablet by mouth nightly  Qty: 90 tablet, Refills: 3      aspirin 81 MG EC tablet Take 1 tablet by mouth daily  Qty: 30 tablet, Refills: 0      albuterol (PROVENTIL) (2.5 MG/3ML) 0.083% nebulizer solution Take 3 mLs by nebulization every 6 hours as needed for Wheezing or Shortness of Breath  Qty: 25 vial, Refills: 1    Associated Diagnoses: SOB (shortness of breath)      albuterol sulfate HFA (PROVENTIL HFA) 108 (90 Base) MCG/ACT inhaler Inhale 2 puffs into the lungs every 6 hours as needed for Wheezing  Qty: 1 Inhaler, Refills: 0             Future Appointments   Date Time Provider Riri Jara   8/4/2021  8:30 AM SCHEDULE, [facility]   8/18/2021  9:15 AM Allie Swan MD P CLER CAR [facility]   8/18/2021 11:00 AM Tamara Ezekiel Grace MD W ORTHO [facility]   9/8/2021  7:05 AM SCHEDULE, [facility] Driscoll Gang Car [facility]   10/12/2021 10:15 AM Vic Noe MD CLE PULM [facility]   10/13/2021  7:05 AM SCHEDULE, [facility]   11/17/2021  7:05 AM SCHEDULE, [facility]   12/22/2021  7:05 AM SCHEDULE, [facility] TRANSM[facility]ION [employer] [facility]   1/26/2022  7:05 AM SCHEDULE, [facility]       Time Spent on discharge is more than 30 minutes in the examination, evaluation, counseling and review of medications and discharge plan. Signed:    Micah Dumont MD   8/3/2021      Thank you ANICETO Monroy - ROXANA for the opportunity to be involved in this patient's care. If you have any questions or concerns please feel free to contact me at [phone].

## 2021-08-03 NOTE — BRIEF OP NOTE
Brief Postoperative Note      Patient: Kp Mckee  YOB: 1960  MRN: [id number]    Date of Procedure: 8/2/2021    Pre-Op Diagnosis: LEFT FOOT PAINFUL HARDWARE    Post-Op Diagnosis: Same       Procedure(s):  LEFT FOOT SCREW REMOVAL    Surgeon(s):  Susan Mendoza MD    Assistant: Ramesh Singh CNP    Anesthesia: General    Estimated Blood Loss (mL): Minimal    Complications: None    Specimens:   ID Type Source Tests Collected by Time Destination   A : [place] ros[place], for gross only Hardware Hardware 59 Count includes the [facility] MD El 8/2/2021 4989        Implants:  * No implants in log *      Drains: * No LDAs found *    Findings: Same    Electronically signed by Susan Mendoza MD on 8/2/2021 at 10:01 PM

## 2021-08-03 NOTE — PLAN OF CARE
Problem: Pain:  Goal: Pain level will decrease  Description: Pain level will decrease  8/3/2021 1023 by Savanna Heard RN  Outcome: Ongoing  Note: Pt assessed for pain. Pt in pain and assessed with 0-10 pain rating scale. Pt given prescribed analgesic for pain. (See eMar) Pt satisfied with pain relief thus far. Will reassess and continue to monitor. Problem: Pain:  Goal: Control of acute pain  Description: Control of acute pain  8/3/2021 1023 by Savanna Heard RN  Outcome: Ongoing  Note: Pt assessed for pain. Pt in pain and assessed with 0-10 pain rating scale. Pt given prescribed analgesic for pain. (See eMar) Pt satisfied with pain relief thus far. Will reassess and continue to monitor. Problem: Pain:  Goal: Control of chronic pain  Description: Control of chronic pain  8/3/2021 1023 by Savanna Heard RN  Outcome: Ongoing  Note: Pt assessed for pain. Pt in pain and assessed with 0-10 pain rating scale. Pt given prescribed analgesic for pain. (See eMar) Pt satisfied with pain relief thus far. Will reassess and continue to monitor. Problem: Falls - Risk of:  Goal: Will remain free from falls  Description: Will remain free from falls  Outcome: Ongoing  Note: Fall risk assessment completed. Fall precautions in place. Bed in lowest position, wheels locked, bed/chair exit alarm in place, call light within reach, and non skid footwear on. Walkway free of clutter. Pt alert and oriented and able to make needs known. Pt educated to use call light when needing to get up, and pt utilizes call light to make needs known. Will continue to monitor. Problem: Falls - Risk of:  Goal: Absence of physical injury  Description: Absence of physical injury  Outcome: Ongoing  Note: Pt is free of injury. No injury noted. Fall precautions in place. Call light within reach. Will monitor.

## 2021-08-04 ENCOUNTER — TELEPHONE (OUTPATIENT)
Dept: CARDIOLOGY CLINIC | Age: 61
End: 2021-08-04

## 2021-08-04 ENCOUNTER — NURSE ONLY (OUTPATIENT)
Dept: CARDIOLOGY CLINIC | Age: 61
End: 2021-08-04
Payer: COMMERCIAL

## 2021-08-04 DIAGNOSIS — I44.1 MOBITZ TYPE I WENCKEBACH ATRIOVENTRICULAR BLOCK: ICD-10-CM

## 2021-08-04 DIAGNOSIS — I46.9 ASYSTOLE (HCC): ICD-10-CM

## 2021-08-04 DIAGNOSIS — R00.1 SINUS BRADYCARDIA: ICD-10-CM

## 2021-08-04 DIAGNOSIS — I25.5 ISCHEMIC CARDIOMYOPATHY: ICD-10-CM

## 2021-08-04 DIAGNOSIS — I44.2 CHB (COMPLETE HEART BLOCK) (HCC): ICD-10-CM

## 2021-08-04 DIAGNOSIS — Z45.09 ENCOUNTER FOR LOOP RECORDER CHECK: ICD-10-CM

## 2021-08-04 PROCEDURE — 93291 INTERROG DEV EVAL SCRMS IP: CPT | Performed by: INTERNAL MEDICINE

## 2021-08-04 NOTE — TELEPHONE ENCOUNTER
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 Pt is requesting that [facility] provide a letter stating that pt is okay to drive from a cardiac stand point without restrictions. Please fax letter to Bridgett Fernandes at the [address] [facility]  @ [phone]. Pt stated he really needs this done by the end of the week.

## 2021-08-04 NOTE — TELEPHONE ENCOUNTER
From my standpoint, general cardiology viewpoint, the patient is candidate for CDL. However, appears he's had loop recently implanted showing CHB and is due to have PPM placed. Warrants EP input from Dr. Antonio Toro and I suspect not a candidate on this basis.      Mihir Sesay MD, [address] [facility]  [phone] [address] [facility]  [phone] [facility]

## 2021-08-04 NOTE — PROGRESS NOTES
Patient comes in for interrogation of their implanted loop recorder. Patient has a history of Mobitz I, asystole, ICM, CHB, sinus bradycardia. Takes Plavix and Toprol XL. Patient's device was implanted on 7/23 by Dr. Naida Diaz. Since last interrogation, numerous bradycardia and pause recordings are noted. EGMs show Mobitz I /CHB. Occasional V ectopy noted. Patients incision is clean and dry with all dressings removed from site. Report reviewed with Dr. Naida Diaz and MD spoke with patient concerning findings. See [employer] report under the Cardiology tab. We will follow the patient remotely.

## 2021-08-05 NOTE — TELEPHONE ENCOUNTER
Procedure: DC pacemaker  Time Requested: 2 hours  Anesthesia Services Requested: Yes  Labs: CBC, PT/INR, Electrolytes (Mg and Ca), Type and cross/screen screen  Equipment: [employer]  Medications to hold: None.

## 2021-08-05 NOTE — TELEPHONE ENCOUNTER
Spoke with patient. Patient is scheduled with Dr. Micah Hassan for Dual Pacemaker Implant with [employer] and anesthesia on 8/26/21 at [facility], arrival time of 1:30pm to the Cath Lab. Please have patient arrive to the main entrance of [facility] and check in with the registration desk. Please call patient regarding medication instructions. Remind patient to be NPO after midnight (8 hours prior). Do not apply lotions/creams on skin the day of procedure. COVID testing - RAPID.

## 2021-08-09 RX ORDER — BENZONATATE 200 MG/1
CAPSULE ORAL
Qty: 30 CAPSULE | Refills: 0 | Status: SHIPPED | OUTPATIENT
Start: 2021-08-09 | End: 2021-10-25

## 2021-08-09 NOTE — TELEPHONE ENCOUNTER
I spoke with patient. He should take ASA, Plavix, Toprol, and Losartan the morning of the procedure with a small sip of water. Do not apply lotions/creams on skin the day of procedure.

## 2021-08-20 NOTE — PROGRESS NOTES
CARDIOLOGY FOLLOW-UP VISIT        Patient Name: Almond Siemens  Primary Care physician: ANICETO Morton CNP  Reason for Referral/Chief complaint: complaints of fatigue and dizziness today    Subjective:     Almond Siemens is a pleasant 63 y.o. man with prior history notable for obstructive sleep apnea noncompliant with CPAP previously, Olvin ACUNA with 2:1, congestive heart failure with ischemic cardiomyopathy, coronary artery disease status post coronary artery bypass with course complicated with vein graft failure x 2, status post PCI LM/D1, and previous smoker. Patient returns today for follow up status post recent intervention. I first evaluated the patient at [facility] 9/25/20 at which time he was treated for unstable angina. Transferred to [facility] for catheterization which revealed multi-vessel coronary artery disease. He underwent three-vessel bypass with Dr. Yesenia Nugent, receiving a LIMA to LAD, SVG to OM and SVG to diagonal. Limited ECHO 9/29/20 confirmed Normal left ventricular systolic function with ejection fraction of 55-60%. At subsequent visits in interim he complained of worsening dyspnea with exertion. Diuretics were up-titrated without relief of symptoms. Repeat echo showed decline in LVEF since bypass. Ischemic evaluation was planned but in interim he was admitted 4/8/-4/2021 due to chest pain. He underwent LHC on 4/14/21 which noted patent LIMA-LAD and occluded vein grafts. On 4/19/21, he underwent rotational atherectomy of the left main, LAD and first diagonal with PCI/ISREAL to D1 as well as LM/LAD. He was readmitted in short order for arm pain at site of prior PICC line. Found to have superficial blood clot in left basilic vein. No anti-coagulation was recommended. Patient was last evaluated on 5/17/2021at which time he had just returned from [facility] from [facility].  He reported that he was walking a mile a day at the park and working out in the gym on treadmill and stationary bike three days a week for up to 1.5 hrs at a time. In interim, last device check 8/4/2021 showed numerous bradycardia and pause recording. EGMs show Mobitz I/CHB. Occasional V ectopy noted. He is having dual chamber pacemaker implanted with Dr. Raina High on 8/26/2021. Today, he reports feeling fatigued and has been having dizziness. He can't sleep at night due to waking up short of breath. He is taking his medications as prescribed. He notes that he has not been eating well. He has loss of appetite. He states that he has noted abominal bloating. He states that he has gained 9 lbs in the past few days according to his home scale. He has had to take a couple of nitroglycerin a week ago for chest pain at rest.  It helped. He notes that every morning he has emesis and had diarrhea today. He is frustrated and depressed about his health and not being able to return to work. The patient is compliant with medications. Cost of medications is affordable. No endorsed side effects. He denies any evidence of hematemesis, hemoptysis, melena, hematochezia or hematuria with blood thinner. Home Medications:  Were reviewed and are listed in nursing record and/or below  Prior to Admission medications    Medication Sig Start Date End Date Taking?  Authorizing Provider   benzonatate (TESSALON) 200 MG capsule TAKE ONE CAPSULE BY MOUTH THREE TIMES A DAY AS NEEDED FOR COUGH 8/9/21  Yes Brittanie Maryville, APRN - CNP   sertraline (ZOLOFT) 100 MG tablet TAKE ONE TABLET BY MOUTH DAILY 7/22/21  Yes Brittanie Maryville, APRN - CNP   losartan (COZAAR) 25 MG tablet Take 0.5 tablets by mouth daily 5/17/21  Yes Alexander Culp MD   OXYGEN Inhale 2 L into the lungs as needed    Yes Historical Provider, MD   meclizine (ANTIVERT) 25 MG tablet Take 1 tablet by mouth 3 times daily as needed for Dizziness 5/5/21  Yes Brittanie Maryville, APRN - CNP   nitroGLYCERIN (NITROSTAT) 0.4 MG SL tablet Place 1 tablet under the tongue every 5 minutes as needed for Chest pain 4/29/21  Yes Nazanin Pisano MD   metoprolol succinate (TOPROL XL) 25 MG extended release tablet Take 1 tablet by mouth daily 4/20/21  Yes ANICETO Hurt CNP   ipratropium (ATROVENT) 0.02 % nebulizer solution Take 2.5 mLs by nebulization 3 times daily 3/2/21  Yes ANICETO Jaeger CNP   traZODone (DESYREL) 100 MG tablet Take 1 tablet by mouth nightly  Patient taking differently: Take 100 mg by mouth 2 times daily Am pm 3/1/21  Yes ANICETO Jaeger CNP   clopidogrel (PLAVIX) 75 MG tablet Take 1 tablet by mouth daily 1/13/21  Yes Nazanin Pisano MD   atorvastatin (LIPITOR) 80 MG tablet Take 1 tablet by mouth nightly  Patient taking differently: Take 40 mg by mouth nightly  10/28/20  Yes Nazanin Pisano MD   aspirin 81 MG EC tablet Take 1 tablet by mouth daily 9/30/20  Yes ANICETO Ball CNP   albuterol (PROVENTIL) (2.5 MG/3ML) 0.083% nebulizer solution Take 3 mLs by nebulization every 6 hours as needed for Wheezing or Shortness of Breath 9/25/19  Yes ANICETO Jaeger CNP   albuterol sulfate HFA (PROVENTIL HFA) 108 (90 Base) MCG/ACT inhaler Inhale 2 puffs into the lungs every 6 hours as needed for Wheezing 5/17/19  Yes Hellen Leventhal, MD   fluticasone-umeclidin-vilant (TRELEGY ELLIPTA) 100-62.5-25 MCG/INH AEPB Inhale 1 puff into the lungs daily  Patient not taking: Reported on 8/23/2021 7/19/21   Margy Gardiner MD   furosemide (LASIX) 80 MG tablet Take 0.5 tablets by mouth daily 4/20/21 4/20/22  ANICETO Hurt CNP   Reports he is not taking digoxin, potassium, furosemide or statin presently. He is taking Plavix in addition aspirin. CURRENT Medications:  No current facility-administered medications for this visit. Allergies:  Dilaudid [hydromorphone hcl] and Codeine     Review of Systems:   A 14 point review of symptoms completed.  Pertinent positives identified in the HPI, all other review of symptoms negative. Objective:     Vitals:    08/23/21 0911   BP: 120/80   Pulse: 83   SpO2: 94%   Weight: 292 lb (132.5 kg)   Height: 5' 4\" (1.626 m)          Wt Readings from Last 3 Encounters:   08/23/21 292 lb (132.5 kg)   08/03/21 287 lb 11.2 oz (130.5 kg)   07/23/21 290 lb (131.5 kg)       PHYSICAL EXAM:    General:  Alert, cooperative, no distress, appears stated age   Head:  Normocephalic, atraumatic   Eyes:  Conjunctiva/corneas clear, anicteric sclerae    Nose: Nares normal, no drainage or sinus tenderness   Throat: No abnormalities of the lips, oral mucosa or tongue. Moist mucous membranes. Neck: Trachea midline. Neck supple with no lymphadenopathy, thyroid not enlarged, symmetric, no tenderness/mass/nodules, no Jugular venous pressure elevation    Lungs:   Clear to auscultation bilaterally, no wheezes, no rales, no respiratory distress   Chest Wall:  Well-healed sternotomy incision. Heart:  Regular rate and rhythm, normal S1, normal S2, no murmur, no rub, no S3/S4, PMI non-palpable. Abdomen:   Obese, soft, non-tender, with normoactive bowel sounds. No masses, no hepatosplenomegaly   Extremities: No cyanosis, clubbing. He has trace pitting of the LE bilaterally. Incision for vein harvest right lower extremity well-healed. Vascular: 2+ radial, dorsalis pedis and posterior tibial pulses bilaterally. Brisk carotid upstrokes without carotid bruit. Skin: Skin color, texture, turgor are normal with no rashes or ulceration. Pysch: Euthymic mood, appropriate affect   Neurologic: Oriented to person, place and time. No slurred speech or facial asymmetry. No motor or sensory deficits on gross examination.          Labs:   CBC:   Lab Results   Component Value Date    WBC 11.1 08/02/2021    RBC 5.19 08/02/2021    HGB 14.1 08/02/2021    HCT 43.2 08/02/2021    MCV 83.2 08/02/2021    RDW 15.0 08/02/2021     08/02/2021     CMP:  Lab Results   Component Value Date     08/02/2021    K 4.1 08/02/2021  08/02/2021    CO2 29 08/02/2021    BUN 13 08/02/2021    CREATININE 0.8 08/02/2021    GFRAA >60 08/02/2021    GFRAA >60 03/16/2011    AGRATIO 1.2 08/02/2021    LABGLOM >60 08/02/2021    GLUCOSE 114 08/02/2021    PROT 6.7 08/02/2021    PROT 6.9 03/16/2011    CALCIUM 8.9 08/02/2021    BILITOT 0.6 08/02/2021    ALKPHOS 103 08/02/2021    AST 12 08/02/2021    ALT 10 08/02/2021     PT/INR:  No results found for: PTINR  HgBA1c:  Lab Results   Component Value Date    LABA1C 6.0 09/25/2020     Lab Results   Component Value Date    TROPONINI <0.01 08/03/2021     Fasting lipid profile from 9/25/2020 reviewed. HDL 34, LDL 16, triglycerides 173, total cholesterol 85. Lab Results   Component Value Date    CHOL 85 09/25/2020    CHOL 142 09/19/2020    CHOL 152 09/11/2020     Lab Results   Component Value Date    TRIG 173 (H) 09/25/2020    TRIG 217 (H) 09/19/2020    TRIG 275 (H) 09/11/2020     Lab Results   Component Value Date    HDL 35 (L) 01/13/2021    HDL 34 (L) 09/25/2020    HDL 33 (L) 09/19/2020     Lab Results   Component Value Date    LDLCALC 33 01/13/2021    LDLCALC 16 09/25/2020    LDLCALC 66 09/19/2020     Lab Results   Component Value Date    LABVLDL 26 01/13/2021    LABVLDL 35 09/25/2020    LABVLDL 43 09/19/2020     No results found for: CHOLHDLRATIO    Interval Testing/Data:   EKG today, personally reviewed with my interpretation: Normal sinus rhythm, left axis, heart rate 71 bpm, prior inferior infarct, poor R wave progression/possible anterior infarct. ECHO 3/12/21  Summary   Limited echo for left ventricle systolic function. Definity is used for   myocardial border enhancement. LV systolic function is mildly reduced with a visually estimated EF=45-50%. The left ventricle is normal in size with normal wall thickness. More prominent Inferior HK on certain views. Indeterminate diastolic function.     Limited ECHO 9/29/20   Summary   Limited only f/u for LVEF post op CABG   Technically difficult examination. Normal left ventricular systolic function with ejection fraction of 55-60%. No regional wall motion abnormalites are seen. Compared to previous study from 8- no changes noted in left   ventricular function. [facility] 4/19/21   Left heart catheterization     LVEDP  5      See diagnostic catheterization report for full details, would add that there is severe tortuosity and eccentric plaque within the left main, LAD and D1 which proximal to mid was 75% and distally was 99%. CONCLUSIONS:      Successful rotational atherectomy and PCI of D1 with single drug-eluting stent  Successful rotational atherectomy and PCI of left main/LAD with single drug-eluting stent  Medical therapy for LCx , this lesion does not appear to be amenable for PCI      [facility] 4/14/2021  LVGRAM     LVEDP  6   GRADIENT ACROSS AORTIC VALVE  none   LV FUNCTION EF 60%   WALL MOTION  normal   MITRAL REGURGITATION  mild         CORONARY ARTERIES     LM Proximal less than 10% stenosis, mid-distal haziness with calcification and 70% eccentric stenosis. LAD  Proximal-mid diffuse tubular 80% stenosis, there is mid-distal bidirectional flow noted. Distal vessel is visualized on LIMA angiogram, this shows 60% distal stenosis. D1 is a large vessel with tortuosity noted and ostial/proximal 70% stenosis followed by mid vessel calcification with 50% stenosis and mid-distal 99% stenosis. LCX Medium to large size vessel, proximal 30 to 40% stenosis. OM 3 appears to be the largest OM and is 100% proximal-mid , distal vessel is seen through left to left collaterals and appears to have less than 10% stenosis. RCA Dominant, tortuous, proximal-mid 60 to 70% stenosis. There are moderate right to left collaterals to the circumflex. BYPASS GRAFTS     LIMA-LAD Widely patent with less than 10% [id number] stenosis, the distal LAD has stenosis as noted above.          SVG-D1  100% proximal/mid-distal          SVG-circumflex  100% [id number]             Findings and plans as noted below were discussed with the patient and family, they understand/agree        CONCLUSIONS:      2 out of 3 bypass grafts are occluded, the LIMA to LAD graft is patent  We will consult with CT surgery regarding options for revascularization which potentially may include redo CABG versus high risk PCI of the left main into diagonal  Will order CTA abd/bilat LE w/ runoff, in case cardiac support devices are needed        Cardiac monitor: 05/12/21        Impression and Plan     1. Coronary artery disease manifest with unstable angina status post bypass 9/2020 with occluded SVG's to LCx and D1 noted on repeat cath, status post rotation atherectomy/PCI 4/19/2021 with Dr. Amber Mendoza  2. Chronic congestive heart failure with reduced EF, compensated by exam today  3. History of heart block, scheduled for pacemaker later this week with Dr. Antonio Toro   4. Chronic obstructive pulmonary disease   5. Obstructive sleep apnea noncompliant with CPAP  6. Obesity  7. Hypertension, controlled  8. Tobacco abuse, now quit.     Patient Active Problem List   Diagnosis    Crush injury of right foot    Shortness of breath    Chest pain    Erectile dysfunction    Hyperglycemia    Anxiety    Depression    Tobacco abuse    History of alcohol abuse    Fatigue    OANH (obstructive sleep apnea)    Hypersomnia    Chronic obstructive pulmonary disease (HCC)    Lumbar radiculopathy    HNP (herniated nucleus pulposus), lumbar    Spondylosis without myelopathy or radiculopathy, lumbar region    DDD (degenerative disc disease), lumbar    Lumbar spine pain    Acute respiratory distress    Class 3 severe obesity with body mass index (BMI) of 45.0 to 49.9 in adult ([address]    Pneumonia, primary atypical    Acute respiratory failure with hypoxia ([address]    Moderate protein-calorie malnutrition (HCC)    Acute respiratory failure ([facility]    Acute on chronic respiratory failure with hypoxemia (HCC)    Acute diastolic congestive heart failure (HCC)    Hyperlipidemia    Insomnia    Morbid obesity with BMI of 45.0-49.9, adult (HCC)    Abnormal cardiovascular stress test    Coronary artery disease involving native heart    S/P three vessel coronary artery bypass    Acute kidney injury ([facility]    Chronic combined systolic and diastolic congestive heart failure (HCC)    Mobitz type I Wenckebach atrioventricular block    Asystole (HCC)    Ischemic cardiomyopathy    Diverticulitis of colon    Coronary artery disease involving native coronary artery of native heart with unstable angina pectoris (HCC)    CHB (complete heart block) (HCC)    Sinus bradycardia    Cellulitis    Acute deep vein thrombosis (DVT) of left upper extremity (HCC)    Cellulitis of chest wall    Painful orthopaedic hardware ([facility]    Encounter for loop recorder check-medtronic       PLAN:  1. EKG today shows normal sinus rhythm with no high-grade block. We will advised to hold metoprolol for given known block and symptoms he is having. Plan to restart beta-blocker status post permanent pacer implant. Continue current medications otherwise. 2.  Obtain labs today including basic metabolic profile, proBNP and CBC  3. Referral placed to return to cardiac rehab as I think he would benefit from this. Will defer following pacemaker implant and healing. 4.  I did recommend he discuss depression management with his PCP and will message provider. Follow up 4 months      Scribe's attestation: This note was scribed in the presence of Anu Pickard MD by Griselda Renee RN. The scribes documentation has been prepared under my direction and personally reviewed by me in its entirety. I confirm that the note above accurately reflects all work, treatment, procedures, and medical decision making performed by me.   Marian Bal MD, personally performed the services described in this documentation as scribed by Steve Salcedo RN in my presence, and it is both accurate and complete to the best of our ability. I will address the patient's cardiac risk factors and adjusted pharmacologic treatment as needed. In addition, I have reinforced the need for patient directed risk factor modification. All questions and concerns were addressed to the patient/family. Alternatives to my treatment were discussed. Thank you for allowing us to participate in the care of Phillip Ruano. Please call me with any questions [phone].     Marleni Paulson MD, [facility]   Cardiovascular Disease  [facility]  [phone] [facility]  [phone] [address] [place] [address]  8/23/2021 9:55 AM

## 2021-08-23 ENCOUNTER — HOSPITAL ENCOUNTER (OUTPATIENT)
Age: 61
Discharge: HOME | End: 2021-08-23
Payer: COMMERCIAL

## 2021-08-23 ENCOUNTER — OFFICE VISIT (OUTPATIENT)
Dept: CARDIOLOGY CLINIC | Age: 61
End: 2021-08-23
Payer: COMMERCIAL

## 2021-08-23 ENCOUNTER — TELEPHONE (OUTPATIENT)
Dept: CARDIOLOGY | Age: 61
End: 2021-08-23

## 2021-08-23 VITALS
SYSTOLIC BLOOD PRESSURE: 120 MMHG | WEIGHT: 292 LBS | BODY MASS INDEX: 49.85 KG/M2 | DIASTOLIC BLOOD PRESSURE: 80 MMHG | HEART RATE: 83 BPM | HEIGHT: 64 IN | OXYGEN SATURATION: 94 %

## 2021-08-23 DIAGNOSIS — I44.2 CHB (COMPLETE HEART BLOCK) (HCC): ICD-10-CM

## 2021-08-23 DIAGNOSIS — I25.110 CORONARY ARTERY DISEASE INVOLVING NATIVE CORONARY ARTERY OF NATIVE HEART WITH UNSTABLE ANGINA PECTORIS (HCC): ICD-10-CM

## 2021-08-23 DIAGNOSIS — I50.42 CHRONIC COMBINED SYSTOLIC AND DIASTOLIC CONGESTIVE HEART FAILURE (HCC): Primary | ICD-10-CM

## 2021-08-23 DIAGNOSIS — I25.5 ISCHEMIC CARDIOMYOPATHY: ICD-10-CM

## 2021-08-23 DIAGNOSIS — R11.10 VOMITING, INTRACTABILITY OF VOMITING NOT SPECIFIED, PRESENCE OF NAUSEA NOT SPECIFIED, UNSPECIFIED VOMITING TYPE: ICD-10-CM

## 2021-08-23 DIAGNOSIS — I50.42 CHRONIC COMBINED SYSTOLIC AND DIASTOLIC CONGESTIVE HEART FAILURE (HCC): ICD-10-CM

## 2021-08-23 LAB
ANION GAP SERPL CALCULATED.3IONS-SCNC: 12 MMOL/L (ref 3–16)
BASOPHILS ABSOLUTE: 0.1 K/UL (ref 0–0.2)
BASOPHILS RELATIVE PERCENT: 0.5 %
BUN BLDV-MCNC: 14 MG/DL (ref 7–20)
CALCIUM SERPL-MCNC: 9.5 MG/DL (ref 8.3–10.6)
CHLORIDE BLD-SCNC: 103 MMOL/L (ref 99–110)
CO2: 28 MMOL/L (ref 21–32)
CREAT SERPL-MCNC: 1 MG/DL (ref 0.9–1.3)
EOSINOPHILS ABSOLUTE: 0.1 K/UL (ref 0–0.6)
EOSINOPHILS RELATIVE PERCENT: 1.3 %
GFR AFRICAN AMERICAN: >60
GFR NON-AFRICAN AMERICAN: >60
GLUCOSE BLD-MCNC: 116 MG/DL (ref 70–99)
HCT VFR BLD CALC: 47.5 % (ref 40.5–52.5)
HEMOGLOBIN: 15.1 G/DL (ref 13.5–17.5)
LYMPHOCYTES ABSOLUTE: 1.7 K/UL (ref 1–5.1)
LYMPHOCYTES RELATIVE PERCENT: 15.3 %
MCH RBC QN AUTO: 26.3 PG (ref 26–34)
MCHC RBC AUTO-ENTMCNC: 31.7 G/DL (ref 31–36)
MCV RBC AUTO: 82.9 FL (ref 80–100)
MONOCYTES ABSOLUTE: 0.5 K/UL (ref 0–1.3)
MONOCYTES RELATIVE PERCENT: 4.6 %
NEUTROPHILS ABSOLUTE: 8.8 K/UL (ref 1.7–7.7)
NEUTROPHILS RELATIVE PERCENT: 78.3 %
PDW BLD-RTO: 15.7 % (ref 12.4–15.4)
PLATELET # BLD: 203 K/UL (ref 135–450)
PMV BLD AUTO: 8.2 FL (ref 5–10.5)
POTASSIUM SERPL-SCNC: 5 MMOL/L (ref 3.5–5.1)
PRO-BNP: 151 PG/ML (ref 0–124)
RBC # BLD: 5.73 M/UL (ref 4.2–5.9)
SODIUM BLD-SCNC: 143 MMOL/L (ref 136–145)
WBC # BLD: 11.3 K/UL (ref 4–11)

## 2021-08-23 PROCEDURE — 93000 ELECTROCARDIOGRAM COMPLETE: CPT | Performed by: INTERNAL MEDICINE

## 2021-08-23 PROCEDURE — 36415 COLL VENOUS BLD VENIPUNCTURE: CPT

## 2021-08-23 PROCEDURE — 83880 ASSAY OF NATRIURETIC PEPTIDE: CPT

## 2021-08-23 PROCEDURE — G8417 CALC BMI ABV UP PARAM F/U: HCPCS | Performed by: INTERNAL MEDICINE

## 2021-08-23 PROCEDURE — G8427 DOCREV CUR MEDS BY ELIG CLIN: HCPCS | Performed by: INTERNAL MEDICINE

## 2021-08-23 PROCEDURE — 3017F COLORECTAL CA SCREEN DOC REV: CPT | Performed by: INTERNAL MEDICINE

## 2021-08-23 PROCEDURE — 85025 COMPLETE CBC W/AUTO DIFF WBC: CPT

## 2021-08-23 PROCEDURE — 80048 BASIC METABOLIC PNL TOTAL CA: CPT

## 2021-08-23 PROCEDURE — 1036F TOBACCO NON-USER: CPT | Performed by: INTERNAL MEDICINE

## 2021-08-23 PROCEDURE — 99214 OFFICE O/P EST MOD 30 MIN: CPT | Performed by: INTERNAL MEDICINE

## 2021-08-23 NOTE — PATIENT INSTRUCTIONS
PLAN:  1. LABS: BNP, BMP and CBC  2. EKG today  3. He is planning to file for disability  4. Referral placed to return to cardiac rehab after pacemaker implant  5. Recommend COVID vaccine  6.  Follow up 4 months

## 2021-08-23 NOTE — TELEPHONE ENCOUNTER
Labs do not reveal obvious reason for fatigue today. Does not appear to be caring much water by these numbers. Not anemic. Potassium is high normal range. Let us stop beta-blocker as discussed and see how he feels following this as well as pacemaker implant. Recheck potassium at time of pacemaker. Please advise on low potassium diet.  deidra CASTRO

## 2021-08-26 ENCOUNTER — HOSPITAL ENCOUNTER (OUTPATIENT)
Dept: CARDIAC CATH/INVASIVE PROCEDURES | Age: 61
Setting detail: OBSERVATION
Discharge: HOME | End: 2021-08-27
Attending: INTERNAL MEDICINE | Admitting: INTERNAL MEDICINE
Payer: COMMERCIAL

## 2021-08-26 ENCOUNTER — ANESTHESIA EVENT (OUTPATIENT)
Dept: CARDIAC CATH/INVASIVE PROCEDURES | Age: 61
End: 2021-08-26
Payer: COMMERCIAL

## 2021-08-26 ENCOUNTER — ANESTHESIA (OUTPATIENT)
Dept: CARDIAC CATH/INVASIVE PROCEDURES | Age: 61
End: 2021-08-26
Payer: COMMERCIAL

## 2021-08-26 ENCOUNTER — PROCEDURE VISIT (OUTPATIENT)
Dept: CARDIOLOGY CLINIC | Age: 61
End: 2021-08-26

## 2021-08-26 VITALS
OXYGEN SATURATION: 99 % | TEMPERATURE: 98.2 F | SYSTOLIC BLOOD PRESSURE: 157 MMHG | RESPIRATION RATE: 7 BRPM | DIASTOLIC BLOOD PRESSURE: 93 MMHG

## 2021-08-26 DIAGNOSIS — Z95.0 PACEMAKER: Primary | ICD-10-CM

## 2021-08-26 DIAGNOSIS — E87.5 HYPERKALEMIA: Primary | ICD-10-CM

## 2021-08-26 PROBLEM — I49.5 SSS (SICK SINUS SYNDROME) (HCC): Status: ACTIVE | Noted: 2021-08-26

## 2021-08-26 LAB
A/G RATIO: 1 (ref 1.1–2.2)
ALBUMIN SERPL-MCNC: 3.7 G/DL (ref 3.4–5)
ALP BLD-CCNC: 114 U/L (ref 40–129)
ALT SERPL-CCNC: 16 U/L (ref 10–40)
ANION GAP SERPL CALCULATED.3IONS-SCNC: 9 MMOL/L (ref 3–16)
AST SERPL-CCNC: 18 U/L (ref 15–37)
BILIRUB SERPL-MCNC: 0.6 MG/DL (ref 0–1)
BUN BLDV-MCNC: 15 MG/DL (ref 7–20)
CALCIUM SERPL-MCNC: 9.1 MG/DL (ref 8.3–10.6)
CHLORIDE BLD-SCNC: 98 MMOL/L (ref 99–110)
CO2: 30 MMOL/L (ref 21–32)
CREAT SERPL-MCNC: 0.9 MG/DL (ref 0.9–1.3)
EKG ATRIAL RATE: 74 BPM
EKG DIAGNOSIS: NORMAL
EKG P AXIS: 49 DEGREES
EKG P-R INTERVAL: 178 MS
EKG Q-T INTERVAL: 364 MS
EKG QRS DURATION: 74 MS
EKG QTC CALCULATION (BAZETT): 404 MS
EKG R AXIS: -40 DEGREES
EKG T AXIS: 77 DEGREES
EKG VENTRICULAR RATE: 74 BPM
GFR AFRICAN AMERICAN: >60
GFR NON-AFRICAN AMERICAN: >60
GLOBULIN: 3.7 G/DL
GLUCOSE BLD-MCNC: 116 MG/DL (ref 70–99)
HCT VFR BLD CALC: 46.4 % (ref 40.5–52.5)
HEMOGLOBIN: 15.1 G/DL (ref 13.5–17.5)
MCH RBC QN AUTO: 26.6 PG (ref 26–34)
MCHC RBC AUTO-ENTMCNC: 32.6 G/DL (ref 31–36)
MCV RBC AUTO: 81.4 FL (ref 80–100)
PDW BLD-RTO: 16.1 % (ref 12.4–15.4)
PLATELET # BLD: 211 K/UL (ref 135–450)
PMV BLD AUTO: 7.4 FL (ref 5–10.5)
POTASSIUM SERPL-SCNC: 4.6 MMOL/L (ref 3.5–5.1)
RBC # BLD: 5.69 M/UL (ref 4.2–5.9)
SODIUM BLD-SCNC: 137 MMOL/L (ref 136–145)
TOTAL PROTEIN: 7.4 G/DL (ref 6.4–8.2)
WBC # BLD: 10.3 K/UL (ref 4–11)

## 2021-08-26 PROCEDURE — 6360000002 HC RX W HCPCS: Performed by: INTERNAL MEDICINE

## 2021-08-26 PROCEDURE — 94761 N-INVAS EAR/PLS OXIMETRY MLT: CPT

## 2021-08-26 PROCEDURE — 7100000001 HC PACU RECOVERY - ADDTL 15 MIN

## 2021-08-26 PROCEDURE — 7100000000 HC PACU RECOVERY - FIRST 15 MIN

## 2021-08-26 PROCEDURE — G0378 HOSPITAL OBSERVATION PER HR: HCPCS

## 2021-08-26 PROCEDURE — 33208 INSRT HEART PM ATRIAL & VENT: CPT

## 2021-08-26 PROCEDURE — 2580000003 HC RX 258: Performed by: INTERNAL MEDICINE

## 2021-08-26 PROCEDURE — 3700000001 HC ADD 15 MINUTES (ANESTHESIA)

## 2021-08-26 PROCEDURE — 2500000003 HC RX 250 WO HCPCS

## 2021-08-26 PROCEDURE — C1892 INTRO/SHEATH,FIXED,PEEL-AWAY: HCPCS

## 2021-08-26 PROCEDURE — 33286 RMVL SUBQ CAR RHYTHM MNTR: CPT

## 2021-08-26 PROCEDURE — C1785 PMKR, DUAL, RATE-RESP: HCPCS

## 2021-08-26 PROCEDURE — 6360000002 HC RX W HCPCS

## 2021-08-26 PROCEDURE — 6360000002 HC RX W HCPCS: Performed by: NURSE ANESTHETIST, CERTIFIED REGISTERED

## 2021-08-26 PROCEDURE — 2709999900 HC NON-CHARGEABLE SUPPLY

## 2021-08-26 PROCEDURE — 33208 INSRT HEART PM ATRIAL & VENT: CPT | Performed by: INTERNAL MEDICINE

## 2021-08-26 PROCEDURE — 2500000003 HC RX 250 WO HCPCS: Performed by: NURSE ANESTHETIST, CERTIFIED REGISTERED

## 2021-08-26 PROCEDURE — 93010 ELECTROCARDIOGRAM REPORT: CPT | Performed by: INTERNAL MEDICINE

## 2021-08-26 PROCEDURE — 85027 COMPLETE CBC AUTOMATED: CPT

## 2021-08-26 PROCEDURE — 2580000003 HC RX 258

## 2021-08-26 PROCEDURE — 6370000000 HC RX 637 (ALT 250 FOR IP): Performed by: INTERNAL MEDICINE

## 2021-08-26 PROCEDURE — 2700000000 HC OXYGEN THERAPY PER DAY

## 2021-08-26 PROCEDURE — C1898 LEAD, PMKR, OTHER THAN TRANS: HCPCS

## 2021-08-26 PROCEDURE — 80053 COMPREHEN METABOLIC PANEL: CPT

## 2021-08-26 PROCEDURE — 33286 RMVL SUBQ CAR RHYTHM MNTR: CPT | Performed by: INTERNAL MEDICINE

## 2021-08-26 PROCEDURE — 3700000000 HC ANESTHESIA ATTENDED CARE

## 2021-08-26 PROCEDURE — 93005 ELECTROCARDIOGRAM TRACING: CPT | Performed by: INTERNAL MEDICINE

## 2021-08-26 RX ORDER — MORPHINE SULFATE 4 MG/ML
2 INJECTION, SOLUTION INTRAMUSCULAR; INTRAVENOUS EVERY 5 MIN PRN
Status: DISCONTINUED | OUTPATIENT
Start: 2021-08-26 | End: 2021-08-26

## 2021-08-26 RX ORDER — TRAZODONE HYDROCHLORIDE 50 MG/1
100 TABLET ORAL 2 TIMES DAILY
Status: DISCONTINUED | OUTPATIENT
Start: 2021-08-26 | End: 2021-08-27 | Stop reason: HOSPADM

## 2021-08-26 RX ORDER — HYDRALAZINE HYDROCHLORIDE 20 MG/ML
5 INJECTION INTRAMUSCULAR; INTRAVENOUS EVERY 10 MIN PRN
Status: DISCONTINUED | OUTPATIENT
Start: 2021-08-26 | End: 2021-08-26

## 2021-08-26 RX ORDER — BENZONATATE 100 MG/1
100 CAPSULE ORAL EVERY 4 HOURS PRN
Status: DISCONTINUED | OUTPATIENT
Start: 2021-08-26 | End: 2021-08-27 | Stop reason: HOSPADM

## 2021-08-26 RX ORDER — VANCOMYCIN HYDROCHLORIDE 1 G/20ML
INJECTION, POWDER, LYOPHILIZED, FOR SOLUTION INTRAVENOUS PRN
Status: DISCONTINUED | OUTPATIENT
Start: 2021-08-26 | End: 2021-08-26 | Stop reason: SDUPTHER

## 2021-08-26 RX ORDER — ONDANSETRON 2 MG/ML
INJECTION INTRAMUSCULAR; INTRAVENOUS PRN
Status: DISCONTINUED | OUTPATIENT
Start: 2021-08-26 | End: 2021-08-26 | Stop reason: SDUPTHER

## 2021-08-26 RX ORDER — DOXYCYCLINE HYCLATE 100 MG
100 TABLET ORAL EVERY 12 HOURS SCHEDULED
Status: DISCONTINUED | OUTPATIENT
Start: 2021-08-26 | End: 2021-08-27 | Stop reason: HOSPADM

## 2021-08-26 RX ORDER — OXYCODONE HYDROCHLORIDE AND ACETAMINOPHEN 5; 325 MG/1; MG/1
2 TABLET ORAL PRN
Status: DISCONTINUED | OUTPATIENT
Start: 2021-08-26 | End: 2021-08-26

## 2021-08-26 RX ORDER — ATORVASTATIN CALCIUM 40 MG/1
40 TABLET, FILM COATED ORAL NIGHTLY
Status: DISCONTINUED | OUTPATIENT
Start: 2021-08-26 | End: 2021-08-27 | Stop reason: HOSPADM

## 2021-08-26 RX ORDER — NITROGLYCERIN 0.4 MG/1
0.4 TABLET SUBLINGUAL EVERY 5 MIN PRN
Status: DISCONTINUED | OUTPATIENT
Start: 2021-08-26 | End: 2021-08-27 | Stop reason: HOSPADM

## 2021-08-26 RX ORDER — DIPHENHYDRAMINE HYDROCHLORIDE 50 MG/ML
12.5 INJECTION INTRAMUSCULAR; INTRAVENOUS
Status: ACTIVE | OUTPATIENT
Start: 2021-08-26 | End: 2021-08-26

## 2021-08-26 RX ORDER — CLOPIDOGREL BISULFATE 75 MG/1
75 TABLET ORAL DAILY
Status: DISCONTINUED | OUTPATIENT
Start: 2021-08-26 | End: 2021-08-27 | Stop reason: HOSPADM

## 2021-08-26 RX ORDER — METOPROLOL SUCCINATE 25 MG/1
25 TABLET, EXTENDED RELEASE ORAL DAILY
Status: DISCONTINUED | OUTPATIENT
Start: 2021-08-27 | End: 2021-08-27 | Stop reason: HOSPADM

## 2021-08-26 RX ORDER — MECLIZINE HCL 12.5 MG/1
25 TABLET ORAL 3 TIMES DAILY PRN
Status: DISCONTINUED | OUTPATIENT
Start: 2021-08-26 | End: 2021-08-27 | Stop reason: HOSPADM

## 2021-08-26 RX ORDER — ROCURONIUM BROMIDE 10 MG/ML
INJECTION, SOLUTION INTRAVENOUS PRN
Status: DISCONTINUED | OUTPATIENT
Start: 2021-08-26 | End: 2021-08-26 | Stop reason: SDUPTHER

## 2021-08-26 RX ORDER — MIDAZOLAM HYDROCHLORIDE 1 MG/ML
INJECTION INTRAMUSCULAR; INTRAVENOUS PRN
Status: DISCONTINUED | OUTPATIENT
Start: 2021-08-26 | End: 2021-08-26 | Stop reason: SDUPTHER

## 2021-08-26 RX ORDER — ALBUTEROL SULFATE 2.5 MG/3ML
2.5 SOLUTION RESPIRATORY (INHALATION) EVERY 4 HOURS PRN
Status: DISCONTINUED | OUTPATIENT
Start: 2021-08-26 | End: 2021-08-27 | Stop reason: HOSPADM

## 2021-08-26 RX ORDER — SODIUM CHLORIDE 0.9 % (FLUSH) 0.9 %
5-40 SYRINGE (ML) INJECTION EVERY 12 HOURS SCHEDULED
Status: DISCONTINUED | OUTPATIENT
Start: 2021-08-26 | End: 2021-08-27 | Stop reason: HOSPADM

## 2021-08-26 RX ORDER — LOSARTAN POTASSIUM 25 MG/1
12.5 TABLET ORAL DAILY
Status: DISCONTINUED | OUTPATIENT
Start: 2021-08-27 | End: 2021-08-27 | Stop reason: HOSPADM

## 2021-08-26 RX ORDER — SUCCINYLCHOLINE CHLORIDE 20 MG/ML
INJECTION INTRAMUSCULAR; INTRAVENOUS PRN
Status: DISCONTINUED | OUTPATIENT
Start: 2021-08-26 | End: 2021-08-26 | Stop reason: SDUPTHER

## 2021-08-26 RX ORDER — PHENYLEPHRINE HCL IN 0.9% NACL 1 MG/10 ML
SYRINGE (ML) INTRAVENOUS PRN
Status: DISCONTINUED | OUTPATIENT
Start: 2021-08-26 | End: 2021-08-26 | Stop reason: SDUPTHER

## 2021-08-26 RX ORDER — ASPIRIN 81 MG/1
81 TABLET ORAL DAILY
Status: DISCONTINUED | OUTPATIENT
Start: 2021-08-26 | End: 2021-08-27 | Stop reason: HOSPADM

## 2021-08-26 RX ORDER — MEPERIDINE HYDROCHLORIDE 50 MG/ML
12.5 INJECTION INTRAMUSCULAR; INTRAVENOUS; SUBCUTANEOUS EVERY 5 MIN PRN
Status: DISCONTINUED | OUTPATIENT
Start: 2021-08-26 | End: 2021-08-26

## 2021-08-26 RX ORDER — SODIUM CHLORIDE 9 MG/ML
25 INJECTION, SOLUTION INTRAVENOUS PRN
Status: DISCONTINUED | OUTPATIENT
Start: 2021-08-26 | End: 2021-08-27 | Stop reason: HOSPADM

## 2021-08-26 RX ORDER — PROMETHAZINE HYDROCHLORIDE 25 MG/ML
6.25 INJECTION, SOLUTION INTRAMUSCULAR; INTRAVENOUS
Status: DISCONTINUED | OUTPATIENT
Start: 2021-08-26 | End: 2021-08-26

## 2021-08-26 RX ORDER — DEXAMETHASONE SODIUM PHOSPHATE 4 MG/ML
INJECTION, SOLUTION INTRA-ARTICULAR; INTRALESIONAL; INTRAMUSCULAR; INTRAVENOUS; SOFT TISSUE PRN
Status: DISCONTINUED | OUTPATIENT
Start: 2021-08-26 | End: 2021-08-26 | Stop reason: SDUPTHER

## 2021-08-26 RX ORDER — SODIUM CHLORIDE 9 MG/ML
1000 INJECTION, SOLUTION INTRAVENOUS CONTINUOUS
Status: DISCONTINUED | OUTPATIENT
Start: 2021-08-26 | End: 2021-08-27 | Stop reason: HOSPADM

## 2021-08-26 RX ORDER — OXYCODONE HYDROCHLORIDE AND ACETAMINOPHEN 5; 325 MG/1; MG/1
1 TABLET ORAL PRN
Status: DISCONTINUED | OUTPATIENT
Start: 2021-08-26 | End: 2021-08-26

## 2021-08-26 RX ORDER — MORPHINE SULFATE 4 MG/ML
1 INJECTION, SOLUTION INTRAMUSCULAR; INTRAVENOUS EVERY 5 MIN PRN
Status: DISCONTINUED | OUTPATIENT
Start: 2021-08-26 | End: 2021-08-26

## 2021-08-26 RX ORDER — ONDANSETRON 2 MG/ML
4 INJECTION INTRAMUSCULAR; INTRAVENOUS
Status: DISCONTINUED | OUTPATIENT
Start: 2021-08-26 | End: 2021-08-26

## 2021-08-26 RX ORDER — OXYCODONE HYDROCHLORIDE 5 MG/1
10 TABLET ORAL EVERY 4 HOURS PRN
Status: DISCONTINUED | OUTPATIENT
Start: 2021-08-26 | End: 2021-08-27 | Stop reason: HOSPADM

## 2021-08-26 RX ORDER — LIDOCAINE HYDROCHLORIDE 20 MG/ML
INJECTION, SOLUTION INFILTRATION; PERINEURAL PRN
Status: DISCONTINUED | OUTPATIENT
Start: 2021-08-26 | End: 2021-08-26 | Stop reason: SDUPTHER

## 2021-08-26 RX ORDER — ALBUTEROL SULFATE 2.5 MG/3ML
2.5 SOLUTION RESPIRATORY (INHALATION) EVERY 6 HOURS PRN
Status: DISCONTINUED | OUTPATIENT
Start: 2021-08-26 | End: 2021-08-26

## 2021-08-26 RX ORDER — SODIUM CHLORIDE 0.9 % (FLUSH) 0.9 %
5-40 SYRINGE (ML) INJECTION PRN
Status: DISCONTINUED | OUTPATIENT
Start: 2021-08-26 | End: 2021-08-27 | Stop reason: HOSPADM

## 2021-08-26 RX ORDER — EPHEDRINE SULFATE 50 MG/ML
INJECTION INTRAVENOUS PRN
Status: DISCONTINUED | OUTPATIENT
Start: 2021-08-26 | End: 2021-08-26 | Stop reason: SDUPTHER

## 2021-08-26 RX ORDER — ONDANSETRON 2 MG/ML
4 INJECTION INTRAMUSCULAR; INTRAVENOUS EVERY 6 HOURS PRN
Status: DISCONTINUED | OUTPATIENT
Start: 2021-08-26 | End: 2021-08-27 | Stop reason: HOSPADM

## 2021-08-26 RX ORDER — OXYCODONE HYDROCHLORIDE 5 MG/1
5 TABLET ORAL EVERY 4 HOURS PRN
Status: DISCONTINUED | OUTPATIENT
Start: 2021-08-26 | End: 2021-08-27 | Stop reason: HOSPADM

## 2021-08-26 RX ADMIN — SUCCINYLCHOLINE CHLORIDE 160 MG: 20 INJECTION, SOLUTION INTRAMUSCULAR; INTRAVENOUS at 15:06

## 2021-08-26 RX ADMIN — ROCURONIUM BROMIDE 20 MG: 10 SOLUTION INTRAVENOUS at 15:31

## 2021-08-26 RX ADMIN — ONDANSETRON 4 MG: 2 INJECTION INTRAMUSCULAR; INTRAVENOUS at 15:14

## 2021-08-26 RX ADMIN — MIDAZOLAM HYDROCHLORIDE 2 MG: 2 INJECTION, SOLUTION INTRAMUSCULAR; INTRAVENOUS at 14:54

## 2021-08-26 RX ADMIN — ASPIRIN 81 MG: 81 TABLET, COATED ORAL at 19:02

## 2021-08-26 RX ADMIN — ROCURONIUM BROMIDE 10 MG: 10 SOLUTION INTRAVENOUS at 15:06

## 2021-08-26 RX ADMIN — TRAZODONE HYDROCHLORIDE 100 MG: 50 TABLET ORAL at 20:02

## 2021-08-26 RX ADMIN — Medication 10 ML: at 20:03

## 2021-08-26 RX ADMIN — OXYCODONE 10 MG: 5 TABLET ORAL at 19:03

## 2021-08-26 RX ADMIN — ROCURONIUM BROMIDE 40 MG: 10 SOLUTION INTRAVENOUS at 15:15

## 2021-08-26 RX ADMIN — DEXAMETHASONE SODIUM PHOSPHATE 10 MG: 4 INJECTION, SOLUTION INTRAMUSCULAR; INTRAVENOUS at 15:14

## 2021-08-26 RX ADMIN — Medication 100 MCG: at 15:10

## 2021-08-26 RX ADMIN — DOXYCYCLINE HYCLATE 100 MG: 100 TABLET, COATED ORAL at 20:02

## 2021-08-26 RX ADMIN — OXYCODONE 10 MG: 5 TABLET ORAL at 23:54

## 2021-08-26 RX ADMIN — LIDOCAINE HYDROCHLORIDE 80 MG: 20 INJECTION, SOLUTION INFILTRATION; PERINEURAL at 15:06

## 2021-08-26 RX ADMIN — SODIUM CHLORIDE: 9 INJECTION, SOLUTION INTRAVENOUS at 14:52

## 2021-08-26 RX ADMIN — VANCOMYCIN HYDROCHLORIDE 2000 MG: 1 INJECTION, POWDER, LYOPHILIZED, FOR SOLUTION INTRAVENOUS at 15:11

## 2021-08-26 RX ADMIN — CLOPIDOGREL BISULFATE 75 MG: 75 TABLET ORAL at 19:02

## 2021-08-26 RX ADMIN — EPHEDRINE SULFATE 20 MG: 50 INJECTION INTRAVENOUS at 15:11

## 2021-08-26 RX ADMIN — SERTRALINE 100 MG: 50 TABLET, FILM COATED ORAL at 19:03

## 2021-08-26 RX ADMIN — ATORVASTATIN CALCIUM 40 MG: 40 TABLET, FILM COATED ORAL at 20:02

## 2021-08-26 RX ADMIN — Medication 100 MCG: at 15:13

## 2021-08-26 ASSESSMENT — PULMONARY FUNCTION TESTS
PIF_VALUE: 26
PIF_VALUE: 26
PIF_VALUE: 28
PIF_VALUE: 26
PIF_VALUE: 26
PIF_VALUE: 20
PIF_VALUE: 1
PIF_VALUE: 26
PIF_VALUE: 26
PIF_VALUE: 1
PIF_VALUE: 1
PIF_VALUE: 26
PIF_VALUE: 1
PIF_VALUE: 26
PIF_VALUE: 3
PIF_VALUE: 26
PIF_VALUE: 24
PIF_VALUE: 25
PIF_VALUE: 26
PIF_VALUE: 1
PIF_VALUE: 24
PIF_VALUE: 25
PIF_VALUE: 26
PIF_VALUE: 1
PIF_VALUE: 26
PIF_VALUE: 24
PIF_VALUE: 25
PIF_VALUE: 1
PIF_VALUE: 26
PIF_VALUE: 27
PIF_VALUE: 26
PIF_VALUE: 27
PIF_VALUE: 26
PIF_VALUE: 3
PIF_VALUE: 27
PIF_VALUE: 26
PIF_VALUE: 24
PIF_VALUE: 35
PIF_VALUE: 8
PIF_VALUE: 26
PIF_VALUE: 1
PIF_VALUE: 26
PIF_VALUE: 1
PIF_VALUE: 26
PIF_VALUE: 24
PIF_VALUE: 26
PIF_VALUE: 1
PIF_VALUE: 4
PIF_VALUE: 26
PIF_VALUE: 25
PIF_VALUE: 26
PIF_VALUE: 26
PIF_VALUE: 2
PIF_VALUE: 26
PIF_VALUE: 1
PIF_VALUE: 3
PIF_VALUE: 26

## 2021-08-26 ASSESSMENT — PAIN SCALES - GENERAL
PAINLEVEL_OUTOF10: 5
PAINLEVEL_OUTOF10: 0

## 2021-08-26 ASSESSMENT — PAIN DESCRIPTION - ORIENTATION: ORIENTATION: LEFT;UPPER

## 2021-08-26 ASSESSMENT — PAIN DESCRIPTION - FREQUENCY: FREQUENCY: INTERMITTENT

## 2021-08-26 ASSESSMENT — PAIN DESCRIPTION - LOCATION: LOCATION: CHEST

## 2021-08-26 ASSESSMENT — PAIN DESCRIPTION - DESCRIPTORS: DESCRIPTORS: ACHING;BURNING

## 2021-08-26 ASSESSMENT — PAIN DESCRIPTION - ONSET: ONSET: GRADUAL

## 2021-08-26 ASSESSMENT — PAIN DESCRIPTION - PAIN TYPE: TYPE: SURGICAL PAIN

## 2021-08-26 ASSESSMENT — ENCOUNTER SYMPTOMS: SHORTNESS OF BREATH: 1

## 2021-08-26 NOTE — PROGRESS NOTES
A: Assessment completed and documented, call light is in reach, discussed plan of care with patient who agreed, patient denies pain or nausea at present.

## 2021-08-26 NOTE — ANESTHESIA PRE PROCEDURE
Department of Anesthesiology  Preprocedure Note       Name:  Libertad Smith   Age:  63 y.o.  :  1960                                          MRN:  [id number]         Date:  2021      Surgeon: * Surgery not found *    Procedure:     Medications prior to admission:   Prior to Admission medications    Medication Sig Start Date End Date Taking?  Authorizing Provider   benzonatate (TESSALON) 200 MG capsule TAKE ONE CAPSULE BY MOUTH THREE TIMES A DAY AS NEEDED FOR COUGH 21   ANICETO Platt CNP   sertraline (ZOLOFT) 100 MG tablet TAKE ONE TABLET BY MOUTH DAILY 21   ANICETO Platt CNP   fluticasone-umeclidin-vilant (TRELEGY ELLIPTA) 100-62.5-25 MCG/INH AEPB Inhale 1 puff into the lungs daily  Patient not taking: Reported on 2021   Marquis Franco MD   losartan (COZAAR) 25 MG tablet Take 0.5 tablets by mouth daily 21   Abiel Cohen MD   OXYGEN Inhale 2 L into the lungs as needed     Historical Provider, MD   meclizine (ANTIVERT) 25 MG tablet Take 1 tablet by mouth 3 times daily as needed for Dizziness 21   ANICETO Platt CNP   nitroGLYCERIN (NITROSTAT) 0.4 MG SL tablet Place 1 tablet under the tongue every 5 minutes as needed for Chest pain 21   Abiel Cohen MD   furosemide (LASIX) 80 MG tablet Take 0.5 tablets by mouth daily 21  ANICETO Merritt CNP   metoprolol succinate (TOPROL XL) 25 MG extended release tablet Take 1 tablet by mouth daily 21   ANICETO Merritt CNP   ipratropium (ATROVENT) 0.02 % nebulizer solution Take 2.5 mLs by nebulization 3 times daily 3/2/21   ANICETO Platt CNP   traZODone (DESYREL) 100 MG tablet Take 1 tablet by mouth nightly  Patient taking differently: Take 100 mg by mouth 2 times daily Am pm 3/1/21   ANICETO Platt CNP   clopidogrel (PLAVIX) 75 MG tablet Take 1 tablet by mouth daily 21   Abeil Cohen MD   atorvastatin (LIPITOR) 80 MG tablet Take 1 tablet respiratory failure with hypoxemia (HCC) J96.21    Acute diastolic congestive heart failure (HCC) I50.31    Hyperlipidemia E78.5    Insomnia G47.00    Morbid obesity with BMI of 45.0-49.9, adult (HCC) E66.01, Z68.42    Abnormal cardiovascular stress test R94.39    Coronary artery disease involving native heart I25.10    S/P three vessel coronary artery bypass Z95.1    Acute kidney injury (HCC) N17.9    Chronic combined systolic and diastolic congestive heart failure (HCC) I50.42    Mobitz type I Wenckebach atrioventricular block I44.1    Asystole ([facility]) I46.9    Ischemic cardiomyopathy I25.5    Diverticulitis of colon K57.32    Coronary artery disease involving native coronary artery of native heart with unstable angina pectoris ([facility]) I25.110    CHB (complete heart block) ([facility]) I44.2    Sinus bradycardia R00.1    Cellulitis L03.90    Acute deep vein thrombosis (DVT) of left upper extremity ([facility]) I82.622    Cellulitis of chest wall L03.313    Painful orthopaedic hardware ([address] T84.84XA    Encounter for loop recorder check-[employer] Z45.09       Past Medical History:        Diagnosis Date    Acute diverticulitis 04/10/2021    Acute kidney injury ([address] 04/08/2021    Acute on chronic respiratory failure with hypoxemia (HCC)     Acute respiratory failure ([address] 08/19/2020    Acute respiratory failure with hypoxia ([facility])     Anxiety 01/06/2020    Aortic valve calcification 09/2020    seen on lung CT    CAD in native artery 04/14/2021    Chronic obstructive pulmonary disease ([address] 09/03/2019    PFT done 9/03/19    Chronic systolic congestive heart failure ([address] 04/10/2021    Class 3 severe obesity with body mass index (BMI) of 45.0 to 49.9 in adult [facility])     Coronary artery calcification 09/2020    seen on lung ct    Coronary artery disease involving native heart with angina pectoris ([address] 09/22/2020    Crush injury of right foot 07/23/2015    DDD (degenerative disc disease), lumbar SITE CONFIRMED BY FLUOROSCOPY performed by Karina Brunner MD at [address] Right 2020    RIGHT LUMBAR FOUR AND LUMBAR FIVE TRANSFORAMINAL EPIDURAL STEROID INJECTION SITE CONFIRMED BY FLUOROSCOPY [facility]       Social History:    Social History     Tobacco Use    Smoking status: Former Smoker     Packs/day: 2.00     Years: 35.00     Pack years: 70.00     Types: Cigarettes     Quit date: 3/1/2020     Years since quittin.4    Smokeless tobacco: Never Used   Substance Use Topics    Alcohol use: No                                Counseling given: Not Answered      Vital Signs (Current):   Vitals:    21 1315   Weight: 284 lb 11.2 oz (129.1 kg)   Height: 5' 4\" (1.626 m)                                              BP Readings from Last 3 Encounters:   21 120/80   21 (!) 100/58   21 (!) 117/56       NPO Status:                                                                                 BMI:   Wt Readings from Last 3 Encounters:   21 284 lb 11.2 oz (129.1 kg)   21 292 lb (132.5 kg)   21 287 lb 11.2 oz (130.5 kg)     Body mass index is 48.87 kg/m².     CBC:   Lab Results   Component Value Date    WBC 11.3 2021    RBC 5.73 2021    HGB 15.1 2021    HCT 47.5 2021    MCV 82.9 2021    RDW 15.7 2021     2021       CMP:   Lab Results   Component Value Date     2021    K 5.0 2021    K 4.1 2021     2021    CO2 28 2021    BUN 14 2021    CREATININE 1.0 2021    GFRAA >60 2021    GFRAA >60 2011    AGRATIO 1.2 2021    LABGLOM >60 2021    GLUCOSE 116 2021    PROT 6.7 2021    PROT 6.9 2011    CALCIUM 9.5 2021    BILITOT 0.6 2021    ALKPHOS 103 2021    AST 12 2021    ALT 10 2021       POC Tests: No results for input(s): POCGLU, POCNA, POCK, POCCL, POCBUN, POCHEMO, POCHCT in the last 72 hours. Coags:   Lab Results   Component Value Date    PROTIME 12.7 10/19/2020    INR 1.10 10/19/2020    APTT 55.4 04/19/2021       HCG (If Applicable): No results found for: PREGTESTUR, PREGSERUM, HCG, HCGQUANT     ABGs:   Lab Results   Component Value Date    PHART 7.363 09/25/2020    PO2ART 146.4 09/25/2020    GXO3ZUU 41.8 09/25/2020    SLN3GEM 23.8 09/25/2020    BEART -2 09/25/2020    X6BDATMT 99 09/25/2020        Type & Screen (If Applicable):  No results found for: LABABO, LABRH    Drug/Infectious Status (If Applicable):  No results found for: HIV, HEPCAB    COVID-19 Screening (If Applicable):   Lab Results   Component Value Date    COVID19 Not Detected 08/02/2021    COVID19 Not Detected 07/23/2021           Anesthesia Evaluation   no history of anesthetic complications:   Airway: Mallampati: III  TM distance: <3 FB   Neck ROM: limited  Mouth opening: > = 3 FB Dental:    (+) lower dentures and upper dentures      Pulmonary:   (+) COPD:  shortness of breath:  sleep apnea:                            ROS comment: H/o tob   Cardiovascular:    (+) hypertension:, CAD:, CABG/stent:, dysrhythmias (yoon):, CHF: systolic and diastolic, hyperlipidemia    (-)  angina                Neuro/Psych:   (+) neuromuscular disease:, psychiatric history:depression/anxiety             GI/Hepatic/Renal:   (+) morbid obesity     (-) GERD      ROS comment: H/o EtOH abuse. Endo/Other: Negative Endo/Other ROS                    Abdominal:             Vascular:   + DVT, . Other Findings:             Anesthesia Plan      MAC and general     ASA 3     (Risks, benefits and alternatives of MAC anesthesia and GETA discussed with pt. Questions answered. Willing to proceed.)  Induction: intravenous. Anesthetic plan and risks discussed with patient.                       Sandeep Olmos MD   8/26/2021

## 2021-08-26 NOTE — PROGRESS NOTES
D: Patient here from Cath lab via stretcher, taken to bay 7 in PACU, all current drips, treatments, skin issues, and plan of care were reviewed by both RN's, patient transferred in stable condition.

## 2021-08-26 NOTE — ANESTHESIA POSTPROCEDURE EVALUATION
Department of Anesthesiology  Postprocedure Note    Patient: Bernhard Oppenheim  MRN: [id number]  YOB: 1960  Date of evaluation: 8/26/2021  Time:  6:06 PM     Procedure Summary     Date: 08/26/21 Room / Location: [address] [place] [address] Cardiac Cath Lab    Anesthesia Start: 1454 Anesthesia Stop:     Procedure: PACEMAKER Diagnosis:       Bradycardia, unspecified      SSS (sick sinus syndrome) (HCC)      Bradycardia, unspecified    Scheduled Providers:  Responsible Provider: Jose Antonio Cantrell MD    Anesthesia Type: MAC, general ASA Status: 3          Anesthesia Type: No value filed. Adele Phase I: Adele Score: 9    Adele Phase II:      Last vitals: Reviewed and per EMR flowsheets. Anesthesia Post Evaluation    Patient location during evaluation: PACU  Patient participation: complete - patient participated  Level of consciousness: awake and alert  Airway patency: patent  Nausea & Vomiting: no nausea and no vomiting  Complications: no  Cardiovascular status: blood pressure returned to baseline  Respiratory status: acceptable  Hydration status: euvolemic  Comments: VSS on transfer to phase 2 recovery. No anesthetic complications.

## 2021-08-27 ENCOUNTER — APPOINTMENT (OUTPATIENT)
Dept: GENERAL RADIOLOGY | Age: 61
End: 2021-08-27
Attending: INTERNAL MEDICINE
Payer: COMMERCIAL

## 2021-08-27 ENCOUNTER — NURSE ONLY (OUTPATIENT)
Dept: CARDIOLOGY CLINIC | Age: 61
End: 2021-08-27
Payer: COMMERCIAL

## 2021-08-27 VITALS
OXYGEN SATURATION: 94 % | BODY MASS INDEX: 51.32 KG/M2 | HEIGHT: 64 IN | HEART RATE: 82 BPM | TEMPERATURE: 97.9 F | DIASTOLIC BLOOD PRESSURE: 78 MMHG | RESPIRATION RATE: 18 BRPM | WEIGHT: 300.6 LBS | SYSTOLIC BLOOD PRESSURE: 126 MMHG

## 2021-08-27 DIAGNOSIS — I44.2 CHB (COMPLETE HEART BLOCK) (HCC): ICD-10-CM

## 2021-08-27 DIAGNOSIS — Z95.0 PACEMAKER: ICD-10-CM

## 2021-08-27 DIAGNOSIS — R00.1 SINUS BRADYCARDIA: ICD-10-CM

## 2021-08-27 PROBLEM — Z45.09 ENCOUNTER FOR LOOP RECORDER CHECK: Status: RESOLVED | Noted: 2021-07-23 | Resolved: 2021-08-27

## 2021-08-27 PROBLEM — R07.9 CHEST PAIN: Status: RESOLVED | Noted: 2019-08-08 | Resolved: 2021-08-27

## 2021-08-27 LAB
ANION GAP SERPL CALCULATED.3IONS-SCNC: 10 MMOL/L (ref 3–16)
ANION GAP SERPL CALCULATED.3IONS-SCNC: 9 MMOL/L (ref 3–16)
BUN BLDV-MCNC: 16 MG/DL (ref 7–20)
BUN BLDV-MCNC: 17 MG/DL (ref 7–20)
CALCIUM SERPL-MCNC: 8.8 MG/DL (ref 8.3–10.6)
CALCIUM SERPL-MCNC: 9.1 MG/DL (ref 8.3–10.6)
CHLORIDE BLD-SCNC: 100 MMOL/L (ref 99–110)
CHLORIDE BLD-SCNC: 102 MMOL/L (ref 99–110)
CO2: 25 MMOL/L (ref 21–32)
CO2: 26 MMOL/L (ref 21–32)
CREAT SERPL-MCNC: 0.8 MG/DL (ref 0.9–1.3)
CREAT SERPL-MCNC: 0.8 MG/DL (ref 0.9–1.3)
EKG ATRIAL RATE: 77 BPM
EKG DIAGNOSIS: NORMAL
EKG P AXIS: 51 DEGREES
EKG P-R INTERVAL: 188 MS
EKG Q-T INTERVAL: 372 MS
EKG QRS DURATION: 76 MS
EKG QTC CALCULATION (BAZETT): 420 MS
EKG R AXIS: -53 DEGREES
EKG T AXIS: 68 DEGREES
EKG VENTRICULAR RATE: 77 BPM
GFR AFRICAN AMERICAN: >60
GFR AFRICAN AMERICAN: >60
GFR NON-AFRICAN AMERICAN: >60
GFR NON-AFRICAN AMERICAN: >60
GLUCOSE BLD-MCNC: 166 MG/DL (ref 70–99)
GLUCOSE BLD-MCNC: 169 MG/DL (ref 70–99)
HCT VFR BLD CALC: 44.2 % (ref 40.5–52.5)
HEMOGLOBIN: 14.3 G/DL (ref 13.5–17.5)
MAGNESIUM: 2.1 MG/DL (ref 1.8–2.4)
MCH RBC QN AUTO: 26.3 PG (ref 26–34)
MCHC RBC AUTO-ENTMCNC: 32.4 G/DL (ref 31–36)
MCV RBC AUTO: 81 FL (ref 80–100)
PDW BLD-RTO: 16 % (ref 12.4–15.4)
PLATELET # BLD: 207 K/UL (ref 135–450)
PMV BLD AUTO: 7.8 FL (ref 5–10.5)
POTASSIUM SERPL-SCNC: 5.2 MMOL/L (ref 3.5–5.1)
POTASSIUM SERPL-SCNC: 5.3 MMOL/L (ref 3.5–5.1)
RBC # BLD: 5.45 M/UL (ref 4.2–5.9)
SODIUM BLD-SCNC: 135 MMOL/L (ref 136–145)
SODIUM BLD-SCNC: 137 MMOL/L (ref 136–145)
TROPONIN: <0.01 NG/ML
WBC # BLD: 14.4 K/UL (ref 4–11)

## 2021-08-27 PROCEDURE — 83735 ASSAY OF MAGNESIUM: CPT

## 2021-08-27 PROCEDURE — 99024 POSTOP FOLLOW-UP VISIT: CPT | Performed by: NURSE PRACTITIONER

## 2021-08-27 PROCEDURE — G0378 HOSPITAL OBSERVATION PER HR: HCPCS

## 2021-08-27 PROCEDURE — 2700000000 HC OXYGEN THERAPY PER DAY

## 2021-08-27 PROCEDURE — 6370000000 HC RX 637 (ALT 250 FOR IP): Performed by: INTERNAL MEDICINE

## 2021-08-27 PROCEDURE — 80048 BASIC METABOLIC PNL TOTAL CA: CPT

## 2021-08-27 PROCEDURE — 71046 X-RAY EXAM CHEST 2 VIEWS: CPT

## 2021-08-27 PROCEDURE — 2580000003 HC RX 258: Performed by: INTERNAL MEDICINE

## 2021-08-27 PROCEDURE — 93280 PM DEVICE PROGR EVAL DUAL: CPT | Performed by: INTERNAL MEDICINE

## 2021-08-27 PROCEDURE — 6360000002 HC RX W HCPCS: Performed by: NURSE PRACTITIONER

## 2021-08-27 PROCEDURE — 84484 ASSAY OF TROPONIN QUANT: CPT

## 2021-08-27 PROCEDURE — 93005 ELECTROCARDIOGRAM TRACING: CPT | Performed by: INTERNAL MEDICINE

## 2021-08-27 PROCEDURE — 96374 THER/PROPH/DIAG INJ IV PUSH: CPT

## 2021-08-27 PROCEDURE — 94761 N-INVAS EAR/PLS OXIMETRY MLT: CPT

## 2021-08-27 PROCEDURE — 36415 COLL VENOUS BLD VENIPUNCTURE: CPT

## 2021-08-27 PROCEDURE — 85027 COMPLETE CBC AUTOMATED: CPT

## 2021-08-27 RX ORDER — DOXYCYCLINE HYCLATE 100 MG
100 TABLET ORAL EVERY 12 HOURS SCHEDULED
Qty: 8 TABLET | Refills: 0 | Status: SHIPPED | OUTPATIENT
Start: 2021-08-27 | End: 2021-08-31

## 2021-08-27 RX ORDER — FUROSEMIDE 10 MG/ML
40 INJECTION INTRAMUSCULAR; INTRAVENOUS ONCE
Status: COMPLETED | OUTPATIENT
Start: 2021-08-27 | End: 2021-08-27

## 2021-08-27 RX ADMIN — OXYCODONE 5 MG: 5 TABLET ORAL at 08:47

## 2021-08-27 RX ADMIN — SERTRALINE 100 MG: 50 TABLET, FILM COATED ORAL at 08:45

## 2021-08-27 RX ADMIN — NITROGLYCERIN 0.4 MG: 0.4 TABLET, ORALLY DISINTEGRATING SUBLINGUAL at 05:08

## 2021-08-27 RX ADMIN — METOPROLOL SUCCINATE 25 MG: 25 TABLET, EXTENDED RELEASE ORAL at 08:46

## 2021-08-27 RX ADMIN — CLOPIDOGREL BISULFATE 75 MG: 75 TABLET ORAL at 08:47

## 2021-08-27 RX ADMIN — FUROSEMIDE 40 MG: 10 INJECTION, SOLUTION INTRAMUSCULAR; INTRAVENOUS at 11:06

## 2021-08-27 RX ADMIN — ASPIRIN 81 MG: 81 TABLET, COATED ORAL at 08:47

## 2021-08-27 RX ADMIN — DOXYCYCLINE HYCLATE 100 MG: 100 TABLET, COATED ORAL at 08:48

## 2021-08-27 RX ADMIN — LOSARTAN POTASSIUM 12.5 MG: 25 TABLET, FILM COATED ORAL at 08:46

## 2021-08-27 RX ADMIN — Medication 10 ML: at 08:48

## 2021-08-27 RX ADMIN — OXYCODONE 10 MG: 5 TABLET ORAL at 03:57

## 2021-08-27 RX ADMIN — TRAZODONE HYDROCHLORIDE 100 MG: 50 TABLET ORAL at 08:46

## 2021-08-27 ASSESSMENT — PAIN DESCRIPTION - LOCATION: LOCATION: CHEST;ARM

## 2021-08-27 ASSESSMENT — PAIN DESCRIPTION - ORIENTATION: ORIENTATION: UPPER

## 2021-08-27 ASSESSMENT — PAIN SCALES - GENERAL
PAINLEVEL_OUTOF10: 5
PAINLEVEL_OUTOF10: 6
PAINLEVEL_OUTOF10: 7

## 2021-08-27 NOTE — PROCEDURES
[facility]  Electrophysiology Report      Attending MD She Ratliff MD    Procedures   Dual chamber pacemaker implantation    Indications   Symptomatic sick sinus syndrome    Complication None  EBL  <02BI    Sedation: Provided by anesthesia    Conclusion   Successful dual chamber pacemaker implantation    Description of Procedure  The patient was brought to the electrophysiology laboratory in the fasting state after informed consent was obtained. The patient was placed in the supine position and the left pectoral area was prepared and draped in a sterile fashion. The electrocardiogram, blood pressure, and oxygenation were monitored intra- and post-procedure. Intravenous antibiotic was given prior to the procedure for general antibiotic prophylaxis. The left arm was injected with approximately 10 ml of contrast through the peripheral IV line. A venogram of the axillary, subclavian and innominate veins was performed and revealed a patent vessel. There was no evidence of stenosis. After using buffered lidocaine 1% with epinephrine (1/100,000) for local anesthesia to the left pectoral area, venous access was obtained in the axillary vein at the level of the first rib via the modified Seldinger technique. A 4-5 cm incision was made inferior to the left clavicle. The subcutaneous tissues were dissected to the level of the pre-pectoral fascia and a pocket was fashioned via blunt dissection. Axillary vein access was then obtained from inside the pocket via the modified Seldinger technique. A 7 F sheath was inserted into the left subclavian over a J-wire. The right ventricular lead was inserted and the lead tip positioned at the RV high septum under fluoroscopic guidance (confirmed English). Once appropriate placement was obtained and threshold measurements made to 's specifications, the lead was anchored to the pectoralis fascia using 2-0 silk.   A 7 F sheath was then inserted into the left subclavian vein over a J-wire (switched patient from short 7F sheath to long 7F sheath). The right atrial lead was inserted and the lead tip positioned in the RA appendage under fluoroscopic guidance. Threshold measurements were obtained to the 's specifications, and the lead was anchored to the pectoralis fascia using 2-0 silk. After hemostasis was assured, the pulse generator was connected to the atrial and ventricular leads and appropriate lead pacing/impedance was confirmed. The device was anchored to the pocket using a 2-0 silk and then placed into the pocket with care to ensure the leads were positioned beneath the device. The pocket was irrigated with antibiotic solution. The fascia was closed using interrupted vicryl sutures. The skin and subcutaneous tissues were approximated using running 3-0 Vicryl sutures, and the wound was sealed with poly-cyanoacrylate solution. A sterile dressing was applied to the site. The patient tolerated the procedure well and there were no complications. At the conclusion of the procedure, all sponges and sharps were accounted for by two counts. The attending physician, Nohelia Doherty MD was present for the entire procedure and for interpretation of data. Device and Lead Information  Pulse Generator Model # Manfacturer Serial # Location   [id number] Medtronic [id number] left pectoral, subcutaneous     Lead Model Number Manfacturer Serial Number Lead position   RA [id number] Med[employer]onic [id number] RA appendage   RV [id number] [employer]onic [id number] RV high septum     Lead Sensing and Thresholds  Lead R/P sensing (mV) Threshold (V) Threshold PW (msec) Impedance (?) Final Voltage (V) Final PW (msec)   RA 3.2 0.5 0.5 760 3.5 0.4   RV 11.6 0.6 0.5 1184 3.5 0.4     Bradycardia Settings  Omar Mode LRL URL Pace AVD (ms) Sense AVD (ms) Mode Switching Mode SW Rate   AAI? DDD 60 130 180 150      Proceduralist Notes:  - Will decrease baseline rate to 50.  - RV lead needed to be

## 2021-08-27 NOTE — CARE COORDINATION
CASE MANAGEMENT INITIAL ASSESSMENT      Reviewed chart and completed assessment via telephone withPt at bedside  Explained Case Management role/servicesyes    Primary contact information:    Health Care Decision Maker :   Primary Decision Maker: Yulisa Arzate - Spouse - [phone]          Can this person be reached and be able to respond quickly, such as within a few minutes or hours? Yes  Who would be your back-up decision maker? Name   Phone Number:    Admit date/status:8/26 Obs  Diagnosis:Omar  Is this a Readmission?:  No  Elective admit for cath    [facility]   Precert required for SNF: Yes       3 night stay required: No    Living arrangements, Adls, care needs, prior to admission:Lives w spouse in a 1 story home has a ramp to enter. Not currently using DME except has an O2 concentrator- no tanks. uses prn    Transportation Family     Durable Medical Equipment at home:  Walker__Cane__RTS__ BSC__Shower Chair__  02_x_ HHN__ CPAP__  BiPap__  Hospital Bed__ W/C___ Other__________    Services in the home and/or outpatient, prior to [address] Notification (HEN):NA    Barriers to discharge:None identified unless would need O2 tank for home transport    Plan/comments:Plans home w spouse     ECOC on chart for MD signature    Sajan Hernandez RN     Per RN- tracy need O2 for trans home. Call to spouse to determine O2 provider- states came from [facility] 1 yr ago. Call to [employer] rep- states will need new orders to initiate. Will notify RN to do walk test and continue to follow for poss O2 needs. Sajan Hernandez RN     [id number] per RN pt now weaned down to RA and satting at 94%.   Sajan Hernandez RN

## 2021-08-27 NOTE — DISCHARGE SUMMARY
Patient ID:  Austen Lizarraga  [id number]  89 y.o.  1960    Admit date: 8/26/2021    Discharge date and time: 8/27/2021    Admitting Physician: Madi Salazar MD     Discharge NP: Cynthia Camacho CNP    Admission Diagnoses: Bradycardia, unspecified [R00.1]  SSS (sick sinus syndrome) [facility]) [I49.5]    Discharge Diagnoses: SAME    Admission Condition: fair    Discharged Condition: good    Hospital Course:  Austne Lizarraga was admitted on 8/26/2021 and had elective dual chamber pacemaker implant (Medtronic) and loop recorder explant. Device check was normal and CXR was normal from a device standpoint. Rhythm has been sinus. He reports some chest pain overnight. Currently, he has no complaints. Denies chest pain, palpitations, shortness of breath, and dizziness.      Assessment:   Sick sinus syndrome: stable   - Mobitz I and CHB noted on ILR interrogation 8/4/2021   -s/p dual chamber pacemaker implant on 8/26/2021 (Medtronic)    -device check and CXR reviewed   S/P loop recorder implant 7/23/2021 with explant 8/26/2021  Hyperkalemia: noted this admission   Chronic combined CHF: compensated  Ischemic cardiomyopathy: ongoing   -EF 45-50% 3/2021  CAD:   -s/p CABG x 3 9/2020   -s/p LHC that showed patent LIMA-LAD and occluded vein grafts 4/14/2021   -s/p rotational arthrectomy and PCI with ISREAL to D1 and LM/LAD 4/19/2021  HTN: controlled   HLD  OANH  COPD: wears O2 at home PRN   Diverticulitis   History of tobacco abuse   Obesity: diet, exercise, and weight loss is recommended      Plan:   Continue ASA, statin, Plavix, losartan, Toprol and Lasix  Lasix 40 mg IV x 1 prior to discharge  Doxycycline 100 mg PO BID x 5 days post implant   Recheck BMP in one week due to hyperkalemia   Post procedure instructions reviewed   Device check on 9/2/2021  Follow up in office on 10/8/2021    Device check and CXR reviewed with Dr. Juares No     Patient was seen outside of global device window for hyperkalemia and HTN      Discharge Exam:  /78   Pulse 84   Temp 97.9 °F (36.6 °C) (Oral)   Resp 18   Ht 5' 4\" (1.626 m)   Wt (!) 300 lb 9.6 oz (136.4 kg)   SpO2 93%   BMI 51.60 kg/m²     General Appearance:    Alert, cooperative, no distress, appears stated age   Head:    Normocephalic, without obvious abnormality, atraumatic   Eyes:    PERRL, conjunctiva/corneas clear      Ears:    deferred   Nose:   Nares normal, septum midline, mucosa normal, no drainage  or sinus tenderness   Throat:   Lips, mucosa, and tongue normal; teeth and gums normal   Neck:   Supple, symmetrical, trachea midline, no adenopathy;        thyroid:  No enlargement/tenderness/nodules; no carotid    bruit or JVD   Back:     Symmetric, no curvature, ROM normal, no CVA tenderness   Lungs:     Clear to auscultation bilaterally, respirations unlabored   Chest wall:    No tenderness or deformity; left upper chest incision open to air, no hematoma, left shoulder soft    Heart:    Regular rate and rhythm, S1 and S2 normal, no murmur, rub   or gallop   Abdomen:     Soft, non-tender, bowel sounds active all four quadrants,     no masses, no organomegaly   Genitalia:    deferred   Rectal:    deferred    Extremities:   Extremities normal, atraumatic, no cyanosis or edema   Pulses:   2+ and symmetric all extremities   Skin:   Skin color, texture, turgor normal, no rashes or lesions   Lymph nodes:   Cervical, supraclavicular, and axillary nodes normal   Neurologic:   CNII-XII intact.  Normal strength, sensation and reflexes       throughout     Disposition: home    Patient Instructions:   Current Discharge Medication List      START taking these medications    Details   doxycycline hyclate (VIBRA-TABS) 100 MG tablet Take 1 tablet by mouth every 12 hours for 8 doses  Qty: 8 tablet, Refills: 0         CONTINUE these medications which have NOT CHANGED    Details   benzonatate (TESSALON) 200 MG capsule TAKE ONE CAPSULE BY MOUTH THREE TIMES A DAY AS NEEDED FOR COUGH  Qty: 30 capsule, Refills: 0      sertraline (ZOLOFT) 100 MG tablet TAKE ONE TABLET BY MOUTH DAILY  Qty: 30 tablet, Refills: 2    Associated Diagnoses: Reactive depression;  Anxiety      losartan (COZAAR) 25 MG tablet Take 0.5 tablets by mouth daily  Qty: 30 tablet, Refills: 5    Associated Diagnoses: Acute diastolic congestive heart failure (HCC)      nitroGLYCERIN (NITROSTAT) 0.4 MG SL tablet Place 1 tablet under the tongue every 5 minutes as needed for Chest pain  Qty: 25 tablet, Refills: 3      furosemide (LASIX) 80 MG tablet Take 0.5 tablets by mouth daily  Qty: 45 tablet, Refills: 3      traZODone (DESYREL) 100 MG tablet Take 1 tablet by mouth nightly  Qty: 30 tablet, Refills: 5    Associated Diagnoses: Primary insomnia; Reactive depression      clopidogrel (PLAVIX) 75 MG tablet Take 1 tablet by mouth daily  Qty: 90 tablet, Refills: 3      atorvastatin (LIPITOR) 80 MG tablet Take 1 tablet by mouth nightly  Qty: 90 tablet, Refills: 3      aspirin 81 MG EC tablet Take 1 tablet by mouth daily  Qty: 30 tablet, Refills: 0      albuterol sulfate HFA (PROVENTIL HFA) 108 (90 Base) MCG/ACT inhaler Inhale 2 puffs into the lungs every 6 hours as needed for Wheezing  Qty: 1 Inhaler, Refills: 0      fluticasone-umeclidin-vilant (TRELEGY ELLIPTA) 100-62.5-25 MCG/INH AEPB Inhale 1 puff into the lungs daily  Qty: 60 each, Refills: 3    Associated Diagnoses: Chronic obstructive pulmonary disease, unspecified COPD type ([facility])      OXYGEN Inhale 2 L into the lungs as needed       meclizine (ANTIVERT) 25 MG tablet Take 1 tablet by mouth 3 times daily as needed for Dizziness  Qty: 30 tablet, Refills: 0    Associated Diagnoses: Dizziness      metoprolol succinate (TOPROL XL) 25 MG extended release tablet Take 1 tablet by mouth daily  Qty: 30 tablet, Refills: 11      ipratropium (ATROVENT) 0.02 % nebulizer solution Take 2.5 mLs by nebulization 3 times daily  Qty: 225 mL, Refills: 3    Associated Diagnoses: Chronic obstructive pulmonary disease, unspecified COPD type ([facility]      albuterol (PROVENTIL) (2.5 MG/3ML) 0.083% nebulizer solution Take 3 mLs by nebulization every 6 hours as needed for Wheezing or Shortness of Breath  Qty: 25 vial, Refills: 1    Associated Diagnoses: SOB (shortness of breath)             Post device instructions given  Activity: as tolerated  Diet: cardiac diet    Irina Mendez, APRN-CNP  [address]\[facility] [place] [facility]\""[address]  [phone]

## 2021-08-27 NOTE — H&P
[address]   Cardiology Admission Note              Date:   8/27/2021  Patient name: Sammi Harrington  Date of admission:  8/26/2021  1:13 PM  MRN:   [id number]  YOB: 1960    Primary Care physician: ANICETO Monroy - CNP    Reason for Admission:  Symptomatic bradycardia on GDMT. CHIEF COMPLAINT:  Symptomatic bradycardia     History Obtained From:  patient    HISTORY OF PRESENT ILLNESS:    Sammi Harrington is a 63 y.o. male with a PMH significant for significant for ICM s/p CABG (09/2020 [facility]), HTN, HLD, morbid obesity, OANH, and COPD (former smoker, quit 8 months ago) who presents from home for DC pacemaker implantation.      Past Medical History:   has a past medical history of Acute diverticulitis, Acute kidney injury ([address] Acute on chronic respiratory failure with hypoxemia ([address] Acute respiratory failure ([address] Acute respiratory failure with hypoxia ([address] Anxiety, Aortic valve calcification, CAD in native artery, Chronic obstructive pulmonary disease (HCC), Chronic systolic congestive heart failure (HCC), Class 3 severe obesity with body mass index (BMI) of 45.0 to 49.9 in [facility]), Coronary artery calcification, Coronary artery disease involving native heart with angina pectoris ([address] Crush injury of right foot, DDD (degenerative disc disease), lumbar, Depression, Diverticulitis of colon, Erectile dysfunction, Fatigue, HNP (herniated nucleus pulposus), lumbar, Hyperglycemia, Hyperlipidemia, Hypersomnia, Hypertension, Insomnia, Ischemic cardiomyopathy, Kidney stone, Lumbar radiculopathy, Lumbar spine pain, LVH (left ventricular hypertrophy), Mobitz type I Wenckebach atrioventricular block, Moderate protein-calorie malnutrition ([address] Observed sleep apnea, OANH (obstructive sleep apnea), Pneumonia, primary atypical, Reactive depression, S/P three vessel coronary artery bypass, Spondylosis without myelopathy or radiculopathy, lumbar region, Tobacco abuse, Tobacco use, Reported on 8/23/2021 7/19/21   Roxi Richards MD   OXYGEN Inhale 2 L into the lungs as needed     Historical Provider, MD   meclizine (ANTIVERT) 25 MG tablet Take 1 tablet by mouth 3 times daily as needed for Dizziness 5/5/21   Rajstephen ANICETO Lloyd CNP   metoprolol succinate (TOPROL XL) 25 MG extended release tablet Take 1 tablet by mouth daily 4/20/21   ANICETO Wagner CNP   ipratropium (ATROVENT) 0.02 % nebulizer solution Take 2.5 mLs by nebulization 3 times daily 3/2/21   Jackyho LloydANICETO - CNP   albuterol (PROVENTIL) (2.5 MG/3ML) 0.083% nebulizer solution Take 3 mLs by nebulization every 6 hours as needed for Wheezing or Shortness of Breath 9/25/19   ANICETO Campa CNP       Allergies:  Dilaudid [hydromorphone hcl] and Codeine    Social History:   reports that he quit smoking about 17 months ago. His smoking use included cigarettes. He has a 70.00 pack-year smoking history. He has never used smokeless tobacco. He reports previous drug use. Drugs: Marijuana and Cocaine. He reports that he does not drink alcohol. Family History: family history includes Heart Disease in his mother; High Blood Pressure in his sister; No Known Problems in his sister; Other in his mother. REVIEW OF SYSTEMS:    · Constitutional: there has been no unanticipated weight loss. There's been no change in energy level, sleep pattern, or activity level. · Eyes: No visual changes or diplopia. No scleral icterus. · ENT: No Headaches, hearing loss or vertigo. No mouth sores or sore throat. · Cardiovascular: No chest pain, No dyspnea on exertion, No palpitations or No loss of consciousness. No cough, hemoptysis, No pleuritic pain, or phlebitis. · Respiratory: No cough or wheezing, no sputum production. No hematemesis. · Gastrointestinal: No abdominal pain, appetite loss, blood in stools. No change in bowel or bladder habits.   · Genitourinary: No dysuria, trouble voiding, or hematuria. · Musculoskeletal:  No gait disturbance, No weakness or joint complaints. · Integumentary: No rash or pruritis. · Neurological: No headache, diplopia, change in muscle strength, numbness or tingling. No change in gait, balance, coordination, mood, affect, memory, mentation, behavior. · Psychiatric: No anxiety, or depression. · Endocrine: No temperature intolerance. No excessive thirst, fluid intake, or urination. No tremor. · Hematologic/Lymphatic: No abnormal bruising or bleeding, blood clots or swollen lymph nodes. · Allergic/Immunologic: No nasal congestion or hives. PHYSICAL EXAM:    Physical Examination:    Vital Signs:           Blood pressure 130/80, pulse 96, temperature 98.3 °F (36.8 °C), temperature source Oral, resp. rate 18, height 5' 4\" (1.626 m), weight (!) 300 lb 9.6 oz (136.4 kg), SpO2 94 %. Temp  Av.9 °F (36.6 °C)  Min: 96.6 °F (35.9 °C)  Max: 98.5 °F (36.9 °C)  Pulse  Av.8  Min: 60  Max: 97  BP  Min: 82/48  Max: 157/93  SpO2  Av.6 %  Min: 85 %  Max: 100 %  FiO2   Av.5 %  Min: 95 %  Max: 99 %    Admission Weight:  Weight: 284 lb 11.2 oz (129.1 kg)      I/O:     Intake/Output Summary (Last 24 hours) at 2021 0859  Last data filed at 2021 0545  Gross per 24 hour   Intake 0 ml   Output 700 ml   Net -700 ml            Vent Settings:  Vent Information  SpO2: 94 %       Constitutional and General Appearance: alert, cooperative, no distress and appears stated age  HEENT: PERRL, no cervical lymphadenopathy. No masses palpable.  Normal oral mucosa  Respiratory:  · Normal excursion and expansion without use of accessory muscles  · Resp Auscultation: Normal breath sounds without dullness or wheezing  Cardiovascular:  · The apical impulse is not displaced  · RRR S1S2 w/o M/G/R  Abdomen:  · No masses or tenderness  · Bowel sounds present  Extremities:  ·  No Cyanosis or Clubbing  ·  Lower extremity edema: No  ·  Skin: Warm and dry  Neurological:  · Alert and oriented. · Moves all extremities well  · No abnormalities of mood, affect, memory, mentation, or behavior are noted    DATA:    CARDIOLOGY LABS:   CBC:   Recent Labs     08/26/21  1400 08/27/21  0522   WBC 10.3 14.4*   HGB 15.1 14.3   HCT 46.4 44.2    207     BMP:   Recent Labs     08/26/21  1400 08/26/21  1400 08/27/21  0522     --  137   K 4.6  --  5.2*   CO2 30  --  25   BUN 15  --  16   CREATININE 0.9  --  0.8*   LABGLOM >60   < > >60   GLUCOSE 116*  --  166*    < > = values in this interval not displayed. BNP: No results for input(s): BNP in the last 72 hours. PT/INR: No results for input(s): PROTIME, INR in the last 72 hours. APTT:No results for input(s): APTT in the last 72 hours. CARDIAC ENZYMES:No results for input(s): CKMB, CKMBINDEX, TROPONINI in the last 72 hours.     Invalid input(s): CKTOTAL;3  FASTING LIPID PANEL:  Lab Results   Component Value Date    HDL 35 01/13/2021    LDLCALC 33 01/13/2021    TRIG 173 09/25/2020     LIVER PROFILE:  Recent Labs     08/26/21  1400   AST 18   ALT 16       IMPRESSION:    Patient Active Problem List   Diagnosis    Crush injury of right foot    Shortness of breath    Chest pain    Erectile dysfunction    Hyperglycemia    Anxiety    Depression    Tobacco abuse    History of alcohol abuse    Fatigue    OANH (obstructive sleep apnea)    Hypersomnia    Chronic obstructive pulmonary disease (HCC)    Lumbar radiculopathy    HNP (herniated nucleus pulposus), lumbar    Spondylosis without myelopathy or radiculopathy, lumbar region    DDD (degenerative disc disease), lumbar    Lumbar spine pain    Acute respiratory distress    Class 3 severe obesity with body mass index (BMI) of 45.0 to 49.9 in adult ([address]    Pneumonia, primary atypical    Acute respiratory failure with hypoxia [facility]    Moderate protein-calorie malnutrition (HCC)    Acute respiratory failure ([facility]    Acute on chronic respiratory failure with hypoxemia (HCC)    Acute diastolic congestive heart failure (HCC)    Hyperlipidemia    Insomnia    Morbid obesity with BMI of 45.0-49.9, adult (HCC)    Abnormal cardiovascular stress test    Coronary artery disease involving native heart    S/P three vessel coronary artery bypass    Acute [facility]    Chronic combined systolic and diastolic congestive heart failure (HCC)    Mobitz type I Wenckebach atrioventricular block    Asystole (HCC)    Ischemic cardiomyopathy    Diverticulitis of colon    Coronary artery disease involving native coronary artery of native heart with unstable angina pectoris (HCC)    CHB (complete heart block) ([facility]    Sinus bradycardia    Cellulitis    Acute deep vein thrombosis (DVT) of left upper extremity (HCC)    Cellulitis of chest wall    Painful orthopaedic hardware ([facility]    Encounter for loop recorder check-medtronic    SSS (sick sinus syndrome) ([facility])     RECOMMENDATIONS:  1. DC pacemaker implantation. Discussed with patient and nursing. All questions and concerns were addressed to the patient/family. Alternatives to my treatment were discussed. The note was completed using EMR. Every effort was made to ensure accuracy; however, inadvertent computerized transcription errors may be present.     Vivi Grier MD  Cardiac Electrophysiology  [facility]  [phone] [facility]

## 2021-08-27 NOTE — PROGRESS NOTES
Patient was offered and accepted the [employer] Vaccination. The vaccination attestation form has been signed. The patient has been educated regarding what to expect after getting a covid-19 vaccine-managing side effects of COVID-19 vaccines.  The patient was provided a copy of the Fact sheet for recipients and caregivers Emergency use Authorization (EUA) of the Júnior Specter Covid-19 Vaccine to prevent coronavirus disease 2019 (COVID-19) in individuals 25years of age and older. The patient was provided a vaccination card.

## 2021-08-27 NOTE — PROGRESS NOTES
Device interrogation by company representative. See interrogation for further details. 8/26/2021 - present (1 day)  [address] PPM implant 8/26/21. Next day check. Transmission shows normal sensing and pacing function. EP physician will review. See interrogation under the cardiology tab in the [address] [place] [address] [address] field for more details. Will continue to monitor remotely. Pacing (% of Time Since 26-Aug-2021)   <0.1 % (MVP On)  AP <0.1 %   No  arrhythmias recorded.

## 2021-08-31 ENCOUNTER — TELEPHONE (OUTPATIENT)
Dept: FAMILY MEDICINE CLINIC | Age: 61
End: 2021-08-31

## 2021-08-31 NOTE — TELEPHONE ENCOUNTER
[facility] Trans[facility] Initial Follow Up Call    Outreach made within 2 business days of discharge: Yes    Patient: Kezia Nuñez Patient : 1960   MRN: [id number]  Reason for Admission: There are no discharge diagnoses documented for the most recent discharge. Discharge Date: 21       Spoke with: Patient    Discharge department/facility: [facility] Interactive Patient Contact:  Was patient able to fill all prescriptions:   Was patient instructed to bring all medications to the follow-up visit:   Is patient taking all medications as directed in the discharge summary?    Does patient understand their discharge instructions:   Does patient have questions or concerns that need addressed prior to 7-14 day follow up office visit:     Scheduled appointment with PCP within 7-14 days    Follow Up  Future Appointments   Date Time Provider Riri Jara   2021  8:00 AM SCHEDULE, [facility]dancer [facility]   10/8/2021 10:30 AM ANICETO Castañeda - CNP [employer] [facility]   10/12/2021  7:05 AM SCHEDULE, [facility]   10/12/2021 10:15 AM MD SHAHLA Medina PULROSCOE [facility]   2021  2:30 PM SCHEDULE, [facility] Vola Leisure [facility]   2021  2:30 PM ANICETO Lombardi - CNP [employer] [facility]   2021  8:00 AM Maribel Mauricio MD [facility] [address], [place]

## 2021-09-02 ENCOUNTER — OFFICE VISIT (OUTPATIENT)
Dept: CARDIOLOGY CLINIC | Age: 61
End: 2021-09-02
Payer: COMMERCIAL

## 2021-09-02 ENCOUNTER — HOSPITAL ENCOUNTER (OUTPATIENT)
Age: 61
Discharge: HOME | End: 2021-09-02
Payer: COMMERCIAL

## 2021-09-02 ENCOUNTER — NURSE ONLY (OUTPATIENT)
Dept: CARDIOLOGY CLINIC | Age: 61
End: 2021-09-02
Payer: COMMERCIAL

## 2021-09-02 ENCOUNTER — TELEPHONE (OUTPATIENT)
Dept: CARDIOLOGY CLINIC | Age: 61
End: 2021-09-02

## 2021-09-02 ENCOUNTER — TELEPHONE (OUTPATIENT)
Dept: PULMONOLOGY | Age: 61
End: 2021-09-02

## 2021-09-02 ENCOUNTER — HOSPITAL ENCOUNTER (OUTPATIENT)
Dept: GENERAL RADIOLOGY | Age: 61
Discharge: HOME | End: 2021-09-02
Payer: COMMERCIAL

## 2021-09-02 VITALS
SYSTOLIC BLOOD PRESSURE: 125 MMHG | WEIGHT: 284 LBS | HEIGHT: 64 IN | DIASTOLIC BLOOD PRESSURE: 60 MMHG | OXYGEN SATURATION: 95 % | BODY MASS INDEX: 48.49 KG/M2 | HEART RATE: 85 BPM

## 2021-09-02 DIAGNOSIS — I49.5 SSS (SICK SINUS SYNDROME) (HCC): ICD-10-CM

## 2021-09-02 DIAGNOSIS — I44.1 MOBITZ TYPE I WENCKEBACH ATRIOVENTRICULAR BLOCK: ICD-10-CM

## 2021-09-02 DIAGNOSIS — Z95.0 PACEMAKER: ICD-10-CM

## 2021-09-02 DIAGNOSIS — I46.9 ASYSTOLE (HCC): ICD-10-CM

## 2021-09-02 DIAGNOSIS — I25.5 ISCHEMIC CARDIOMYOPATHY: ICD-10-CM

## 2021-09-02 DIAGNOSIS — R00.1 SINUS BRADYCARDIA: ICD-10-CM

## 2021-09-02 DIAGNOSIS — R06.02 SOB (SHORTNESS OF BREATH): ICD-10-CM

## 2021-09-02 DIAGNOSIS — I44.2 CHB (COMPLETE HEART BLOCK) (HCC): Primary | ICD-10-CM

## 2021-09-02 DIAGNOSIS — I44.2 CHB (COMPLETE HEART BLOCK) (HCC): ICD-10-CM

## 2021-09-02 PROCEDURE — G8427 DOCREV CUR MEDS BY ELIG CLIN: HCPCS | Performed by: INTERNAL MEDICINE

## 2021-09-02 PROCEDURE — 93280 PM DEVICE PROGR EVAL DUAL: CPT | Performed by: INTERNAL MEDICINE

## 2021-09-02 PROCEDURE — 71046 X-RAY EXAM CHEST 2 VIEWS: CPT

## 2021-09-02 PROCEDURE — 1036F TOBACCO NON-USER: CPT | Performed by: INTERNAL MEDICINE

## 2021-09-02 PROCEDURE — G8417 CALC BMI ABV UP PARAM F/U: HCPCS | Performed by: INTERNAL MEDICINE

## 2021-09-02 PROCEDURE — 99214 OFFICE O/P EST MOD 30 MIN: CPT | Performed by: INTERNAL MEDICINE

## 2021-09-02 PROCEDURE — 3017F COLORECTAL CA SCREEN DOC REV: CPT | Performed by: INTERNAL MEDICINE

## 2021-09-02 NOTE — TELEPHONE ENCOUNTER
Can we please get him in for an acute appointment today to be evaluated? He should also have a limited TTE performed today when he comes in to evaluate for possible pericardial effusion following recent pacemaker placement.

## 2021-09-02 NOTE — PATIENT INSTRUCTIONS
RECOMMENDATIONS:  1. Chest Xray today  2. Follow up with pulmonology  3.  Keep follow up appt as scheduled

## 2021-09-02 NOTE — TELEPHONE ENCOUNTER
----- Message from Marianna Painter MD sent at 9/2/2021  4:01 PM EDT -----  Sure, will look into his meds and change if not covered  ----- Message -----  From: Jaime Rodriguez MD  Sent: 9/2/2021   3:35 PM EDT  To: Marianna Painter MD    Good afternoon Dr. Catherine Patterson, this is Rosario Sims. I saw a mutual patient who has COPD and hasn't been able to get his medications because of cost.  He recently had a pacemaker. No pericardial effusion and device healing well. I was hoping you'd be able to work him into your clinic or refill his inhalers. Thanks for consideration and see you soon .     Georgi Murdock MD  Cardiac Electrophysiology  [facility]  [phone] [facility]

## 2021-09-02 NOTE — PROGRESS NOTES
[address]   Electrophysiology Consult Note            Date: 9/2/21  Patient Name: Ade Euceda  YOB: 1960    Primary Care Physician: ANICETO Kamara CNP    CHIEF COMPLAINT:   Chief Complaint   Patient presents with    Follow-up    Shortness of Breath     Constant episodes recently     HISTORY OF PRESENT ILLNESS: Ade Euceda is a 63 y.o. male with a PMH significant for significant for ICM s/p CABG (09/2020 Gabby De Luna), HTN, HLD, morbid obesity, OANH, and COPD (former smoker, quit 8 months ago). In 4/2020 he presented to [facility] with unstable angina, a positive stress test, intermittent 2:1 conduction, and CHB. According to patient he initially presented to the medical system after he was diagnosed with influenza pneumonia for which he required ventilation support for approximately 6 days and a prolonged hospital course of approximate 11 days. He never quite recovered and continued to have issues with shortness of breath. Eventually this led him down the path of newly diagnosed ischemic cardiomyopathy. He subsequently underwent a CABG in September 2020 with Dr. Evelyn Torres. Postoperatively he felt much better for approximately a month however he did have new onset dizziness that has been chronic and ongoing. He has had some issues bouncing off of walls intermittently because of his dizziness. Patient was seen by EP originally after being transferred to [address] [place] [address] from [facility] because of a positive stress test on 4/8/2021. He was transferred to [address] [place] [address] on 4/14/2021 where he underwent a LHC which demonstrated 2 out of 3 bypass grafts are occluded, the LIMA to LAD graft is patent. High risk PCI of the left main into diagonal. After discharge from hospital, patient wore a cardiac event monitor from 4/20/2021 to 5/2/2021 which demonstrated predominately SR with an average HR of 71 (). PAC burden 0.63%, PVC burden 0.01%, longest pause lasting 3.5 seconds.    Patient presents 7/1/2021, for evaluation of nocturnal block on cardiac event monitor. On 7/1/2021, ECG demonstrates SR (74). He reports that he experiences episodes at least 2 times per day where he feels his heart flutter and this makes him become short of breath and dizzy at times. He reports that overall, he feels better. 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 He reports that he is taking his medications as prescribed and tolerates them well. Patient denies current edema, chest pain, or syncope. He underwent loop recorder placement on 07/23/21. He underwent loop recorder extraction and dual chamber pacemaker for SSS placed on 08/27/21. Today he reports he has increased SOB. He reports the SOB started prior to the device placement. He has been using his inhalers. He uses supplemental oxygen at times. He sees DR Jimmy Leblanc for pulmonology. His weight is down to 284 from 290. He denies CP, dizziness or syncope. His Quick Look echo today did not show any pericardial effusion.       Past Medical History:   has a past medical history of Acute diverticulitis, Acute kidney injury ([address] Acute on chronic respiratory failure with hypoxemia ([address] Acute respiratory failure ([address] Acute respiratory failure with hypoxia ([address] Anxiety, Aortic valve calcification, CAD in native artery, Chronic obstructive pulmonary disease (HCC), Chronic systolic congestive heart failure (HCC), Class 3 severe obesity with body mass index (BMI) of 45.0 to 49.9 in [facility]), Coronary artery calcification, Coronary artery disease involving native heart with angina pectoris ([address] Crush injury of right foot, DDD (degenerative disc disease), lumbar, Depression, Diverticulitis of colon, Erectile dysfunction, Fatigue, HNP (herniated nucleus pulposus), lumbar, Hyperglycemia, Hyperlipidemia, Hypersomnia, Hypertension, Insomnia, Ischemic cardiomyopathy, Kidney stone, Lumbar radiculopathy, Lumbar spine pain, LVH (left ventricular hypertrophy), Mobitz type I Wenckebach atrioventricular block, Moderate protein-calorie malnutrition ([facility] Observed sleep apnea, OAHN (obstructive sleep apnea), Pneumonia, primary atypical, Reactive depression, S/P three vessel coronary artery bypass, Spondylosis without myelopathy or radiculopathy, lumbar region, Tobacco abuse, Tobacco use, and Wears dentures. Past Surgical History:   has a past surgical history that includes Cholecystectomy; Cystoscopy (03/16/2011); Pain management procedure (Right, 12/24/2019); Pain management procedure (Right, 1/7/2020); Coronary artery bypass graft (N/A, 9/25/2020); Finger amputation (Right, 2/2/2021); Coronary angioplasty with stent (03/2021); and Ankle surgery (Left, 8/2/2021). Allergies:  Dilaudid [hydromorphone hcl] and Codeine    Social History:   reports that he quit smoking about 18 months ago. His smoking use included cigarettes. He has a 70.00 pack-year smoking history. He has never used smokeless tobacco. He reports previous drug use. Drugs: Marijuana and Cocaine. He reports that he does not drink alcohol. Family History: family history includes Heart Disease in his mother; High Blood Pressure in his sister; No Known Problems in his sister; Other in his mother. Home Medications:    Prior to Admission medications    Medication Sig Start Date End Date Taking?  Authorizing Provider   benzonatate (TESSALON) 200 MG capsule TAKE ONE CAPSULE BY MOUTH THREE TIMES A DAY AS NEEDED FOR COUGH 8/9/21  Yes ANICETO Berman - CNP   sertraline (ZOLOFT) 100 MG tablet TAKE ONE TABLET BY MOUTH DAILY 7/22/21  Yes ANICETO Berman - ROXANA   fluticasone-umeclidin-vilant (TRELEGY ELLIPTA) 100-62.5-25 MCG/INH AEPB Inhale 1 puff into the lungs daily 7/19/21  Yes Silvina Sidhu MD   losartan (COZAAR) 25 MG tablet Take 0.5 tablets by mouth daily 5/17/21  Yes Laura Schuster MD   OXYGEN Inhale 2 L into the lungs as needed    Yes Historical Provider, MD meclizine (ANTIVERT) 25 MG tablet Take 1 tablet by mouth 3 times daily as needed for Dizziness 5/5/21  Yes ANICETO Amor CNP   nitroGLYCERIN (NITROSTAT) 0.4 MG SL tablet Place 1 tablet under the tongue every 5 minutes as needed for Chest pain 4/29/21  Yes Lise Sotomayor MD   furosemide (LASIX) 80 MG tablet Take 0.5 tablets by mouth daily 4/20/21 4/20/22 Yes ANICETO Barbour CNP   metoprolol succinate (TOPROL XL) 25 MG extended release tablet Take 1 tablet by mouth daily 4/20/21  Yes ANICETO Barbour CNP   ipratropium (ATROVENT) 0.02 % nebulizer solution Take 2.5 mLs by nebulization 3 times daily 3/2/21  Yes ANICETO Amor CNP   traZODone (DESYREL) 100 MG tablet Take 1 tablet by mouth nightly  Patient taking differently: Take 100 mg by mouth 2 times daily Am pm 3/1/21  Yes ANICETO Amor CNP   clopidogrel (PLAVIX) 75 MG tablet Take 1 tablet by mouth daily 1/13/21  Yes Lise Sotomayor MD   atorvastatin (LIPITOR) 80 MG tablet Take 1 tablet by mouth nightly  Patient taking differently: Take 40 mg by mouth nightly  10/28/20  Yes Lise Sotomayor MD   aspirin 81 MG EC tablet Take 1 tablet by mouth daily 9/30/20  Yes ANICETO Langford CNP   albuterol (PROVENTIL) (2.5 MG/3ML) 0.083% nebulizer solution Take 3 mLs by nebulization every 6 hours as needed for Wheezing or Shortness of Breath 9/25/19  Yes ANICETO Amor CNP   albuterol sulfate HFA (PROVENTIL HFA) 108 (90 Base) MCG/ACT inhaler Inhale 2 puffs into the lungs every 6 hours as needed for Wheezing 5/17/19  Yes Higinio Muir MD       REVIEW OF SYSTEMS:    All 14-point review of systems are completed and  pertinent positives are mentioned in the history of present illness. Other  systems are reviewed and are negative.     Physical Examination:    /60   Pulse 85   Ht 5' 4\" (1.626 m)   Wt 284 lb (128.8 kg)   SpO2 95%   BMI 48.75 kg/m²      Constitutional and General Appearance:    alert, cooperative, no distress and appears stated age  [de-identified]:    PERRLA, no cervical lymphadenopathy. No masses palpable. Normal oral  mucosa  Respiratory:  · Normal excursion and expansion without use of accessory muscles  · Resp Auscultation: Normal breath sounds without dullness or wheezing  Cardiovascular:  · The apical impulse is not displaced  · RRR S1S2 w/o M/G/R  Abdomen:  · No masses or tenderness  · Bowel sounds present  Extremities:  ·  No Cyanosis or Clubbing  ·  Lower extremity edema: No  · Skin: Warm and dry. Wound clean, dry and intact. Neurological:  · Alert and oriented. · Moves all extremities well  · No abnormalities of mood, affect, memory, mentation, or behavior are noted    DATA:    ECG 9/2/21: Personally reviewed. C 4/14/2021  CONCLUSIONS:     Successful rotational atherectomy and PCI of D1 with single   drug-eluting stent   Successful rotational atherectomy and PCI of left main/LAD with   single drug-eluting stent   Medical therapy for LCx , this lesion does not appear to be   amenable for PCI     5 [facility] 4/14/2021  CONCLUSIONS:      2 out of 3 bypass grafts are occluded, the LIMA to LAD graft is patent  We will consult with CT surgery regarding options for revascularization which potentially may include redo CABG versus high risk PCI of the left main into diagonal  Will order CTA abd/bilat LE w/ runoff, in case cardiac support devices are needed    Stress test 4/8/2021  Summary Large area, reversible, moderate to severe intensity perfusion defect  involving the basal to apical anterolateral, mid to apical anterior and  apex. Mixed ischemia/scar of the inferolateral territory. Decreased wall  motion and myocardial thickening of the above segments. Post-stress LVEF  visually estimated 45-50%. Overall findings represent a high risk scan. Limited Echo 3/12/2021  Summary   Limited echo for left ventricle systolic function. Definity is used for   myocardial border enhancement.    LV systolic function is mildly reduced with a visually estimated EF=45-50%. The left ventricle is normal in size with normal wall thickness. More prominent Inferior HK on certain views. Indeterminate diastolic function. IMPRESSION:    1. Bradycardia   04/14/2021  Patient is a pleasant 29-year-old male with a medical history for ischemic cardiomyopathy status post CABG in September 2020 who presents from home with unstable angina. His exercise stress test showed that he had some chronotropic incompetence however he was able to increase his heart rate without heart block which is helpful from a conduction standpoint given his history of 2 1 conduction, Mobitz type I block, and complete heart block. Most of his complete heart block and been pronounced bradycardic events were nocturnal and occurred around 4 - 5 AM in the morning. Patient does have a history of obstructive sleep apnea as I suspect this is related to his obstructive sleep apnea. I do not see any current pacing indications. His left ventricular function is greater than 35% and he scheduled to undergo a left heart catheterization because of suspected ongoing ischemia. Again I see no indication for ICD.     Because of his now known bradycardic events and history of dizziness and ischemic cardiomyopathy I think it be reasonable patient wear a cardiac monitor for 4 weeks post discharge. 7/1/2021  Patient was well. He wore a cardiac monitor which showed nocturnal 2-1 block. He had some dizziness however was in normal sinus rhythm at that time. His nonsustained ventricular tachycardia on his monitor as well. Patient is very concerned about his dizziness. As he has had some nonsustained ventricular tachycardia and no bradycardia with 2-1 block he would like to move forward with a loop monitor for long-term monitoring given the high risk job he is in. We will arrange for a loop monitor and follow-up afterwards.   - Plan for ILR implantation.  - Continue GDMT (metoprolol). - Follow up after ILR implantation. 09/02/2021  Patient presents for shortness of breath. As he recently had a device we brought him in for limited echocardiogram which was negative for effusion (CABG and RV lead in RV septum so unlikely). Patient hasn't been able to afford COPD therapy until now. He has felt short of breath before pacemaker implantation. He hasn't followed up with his pulmonologist recently. - I will send message to Dr. Anant Horne.  - Routine device follow up. RECOMMENDATIONS:  1. Chest Xray today  2. Follow up with pulmonology  3. Keep follow up appt as scheduled    QUALITY MEASURES  1. Tobacco Cessation Counseling: NA  2. Retake of BP if >140/90:   NA  3. Documentation to PCP/referring for new patient:  Sent to PCP at close of office visit  4. CAD patient on anti-platelet: Yes  5. CAD patient on STATIN therapy:  Yes  6. Patient with CHF and aFib on anticoagulation:  NA     All questions and concerns were addressed to the patient/family. Alternatives to my treatment were discussed. Dr. Bridgette Salinas MD  Electrophysiology  [place] [address]. [address] [place] [address]. [address]. [place] [address]  Phone: [phone]  Fax: [phone]     NOTE: This report was transcribed using voice recognition software. Every effort was made to ensure accuracy, however, inadvertent computerized transcription errors may be present. Scribe's attestation: This note was scribed in the presence of Dr. Bridgette Salinas by Mahad Way RN    The scribe's documentation has been prepared under my direction and personally reviewed by me in its entirety. I confirm that the note above accurately reflects all work, physical examination, the discussion of treatments and procedures, and medical decision making performed by me.       Jimmie Ibrahim MD personally performed the services described in this documentation as scribed by nurse in my presence, and is both accurate and complete.     Electronically signed by Nubia Portillo MD on 9/2/2021 at 3:35 PM

## 2021-09-02 NOTE — TELEPHONE ENCOUNTER
Patient presents to the device clinic 1 week s/p dual ch ppm implant w/ increased SOB since implant. [address] please advise.

## 2021-09-02 NOTE — LETTER
[address] [place] [address] Cardiology - [address]  Phone: [phone]  Fax: [phone]    Bob Ge MD    September 8, 2021     Viral Newman, APRN - CNP  [address] [facility],[address]    Patient: Almond Siemens   MR Number: [id number]   YOB: 1960   Date of Visit: 9/2/2021       Dear Viral Newman: Thank you for referring Almond Siemens to me for evaluation/treatment. Below are the relevant portions of my assessment and plan of care. If you have questions, please do not hesitate to call me. I look forward to following Nga Scott along with you.     Sincerely,    Bob Ge MD

## 2021-09-02 NOTE — PROGRESS NOTES
Patient presents to the device clinic today for a programming evaluation for his pacemaker. Patient has a history of Mobitz I, ICM, asystole, sinus bradycardia, CHB, and SSS. Takes Plavix and Toprol XL. Patient's device was implanted on 8/26 by Dr. Kem Lopez. Patient also had a loop recorder explant. Since then, no arrhthymias recorded. AP 3.6%  9.1%    All sensing and pacing parameters are within normal range. No changes need to be made at this time. Incision is closed, clean, and dry with all dressings/steri strips removed. Site left open to the air. Incision well approximated. No s/s of infection. Patient education was provided about site care, device functionality, in home monitoring, and any other patient questions and/or concerns were addressed. Aftercare and remote monitoring literature was provided. Patient voices understanding. Patient c/o increased SOB. High priority message sent to provider. Please see interrogation for more detail. Patient will follow up in 3 months in office or remotely. See Paceart report under the Cardiology tab.

## 2021-09-02 NOTE — TELEPHONE ENCOUNTER
Spoke w/ AG[facility] - per [address] [facility],[address] , defer to cardio. Betty Abarca do you have any recommendations for this patient? States he can walk to the bathroom and then back to his living room but has to sit down for a few minutes to catch his breath. 1 week s/p dual chamber pacemaker implant w/ AGK for SSS. Last Floy Boom OV 8/23:  PLAN:  1. EKG today shows normal sinus rhythm with no high-grade block. We will advised to hold metoprolol for given known block and symptoms he is having. Plan to restart beta-blocker status post permanent pacer implant. Continue current medications otherwise. 2.  Obtain labs today including basic metabolic profile, proBNP and CBC  3. Referral placed to return to cardiac rehab as I think he would benefit from this. Will defer following pacemaker implant and healing. 4.  I did recommend he discuss depression management with his PCP and will message provider.

## 2021-09-03 ENCOUNTER — TELEPHONE (OUTPATIENT)
Dept: CARDIOLOGY CLINIC | Age: 61
End: 2021-09-03

## 2021-09-03 NOTE — TELEPHONE ENCOUNTER
Patient was advised we could change medication refused change. Will call patient to see if he would like to change. See telephone message from 7/29/21. Patient called with message left for patient to call back to office.

## 2021-09-03 NOTE — TELEPHONE ENCOUNTER
----- Message from Getachew Kay MD sent at 9/3/2021  5:21 PM EDT -----  Please let patient know that chest x-ray shows that his device is then a good and stable position.

## 2021-09-05 ENCOUNTER — APPOINTMENT (OUTPATIENT)
Dept: GENERAL RADIOLOGY | Age: 61
End: 2021-09-05
Payer: COMMERCIAL

## 2021-09-05 ENCOUNTER — APPOINTMENT (OUTPATIENT)
Dept: CT IMAGING | Age: 61
End: 2021-09-05
Payer: COMMERCIAL

## 2021-09-05 ENCOUNTER — HOSPITAL ENCOUNTER (EMERGENCY)
Age: 61
Discharge: HOME | End: 2021-09-06
Attending: EMERGENCY MEDICINE
Payer: COMMERCIAL

## 2021-09-05 DIAGNOSIS — J44.1 COPD EXACERBATION (HCC): ICD-10-CM

## 2021-09-05 DIAGNOSIS — R06.00 DYSPNEA, UNSPECIFIED TYPE: Primary | ICD-10-CM

## 2021-09-05 LAB
A/G RATIO: 1.1 (ref 1.1–2.2)
ALBUMIN SERPL-MCNC: 3.4 G/DL (ref 3.4–5)
ALP BLD-CCNC: 98 U/L (ref 40–129)
ALT SERPL-CCNC: 18 U/L (ref 10–40)
ANION GAP SERPL CALCULATED.3IONS-SCNC: 9 MMOL/L (ref 3–16)
AST SERPL-CCNC: 18 U/L (ref 15–37)
BASE EXCESS VENOUS: 5.7 MMOL/L (ref -3–3)
BASOPHILS ABSOLUTE: 0.2 K/UL (ref 0–0.2)
BASOPHILS RELATIVE PERCENT: 1.2 %
BILIRUB SERPL-MCNC: 0.5 MG/DL (ref 0–1)
BUN BLDV-MCNC: 13 MG/DL (ref 7–20)
CALCIUM SERPL-MCNC: 8.9 MG/DL (ref 8.3–10.6)
CARBOXYHEMOGLOBIN: 2.5 % (ref 0–1.5)
CHLORIDE BLD-SCNC: 100 MMOL/L (ref 99–110)
CO2: 31 MMOL/L (ref 21–32)
CREAT SERPL-MCNC: 0.9 MG/DL (ref 0.9–1.3)
EOSINOPHILS ABSOLUTE: 0.3 K/UL (ref 0–0.6)
EOSINOPHILS RELATIVE PERCENT: 2.5 %
GFR AFRICAN AMERICAN: >60
GFR NON-AFRICAN AMERICAN: >60
GLOBULIN: 3.2 G/DL
GLUCOSE BLD-MCNC: 96 MG/DL (ref 70–99)
HCO3 VENOUS: 29.6 MMOL/L (ref 23–29)
HCT VFR BLD CALC: 44.6 % (ref 40.5–52.5)
HEMOGLOBIN: 14.5 G/DL (ref 13.5–17.5)
INFLUENZA A: NOT DETECTED
INFLUENZA B: NOT DETECTED
LYMPHOCYTES ABSOLUTE: 2 K/UL (ref 1–5.1)
LYMPHOCYTES RELATIVE PERCENT: 14.5 %
MCH RBC QN AUTO: 26.6 PG (ref 26–34)
MCHC RBC AUTO-ENTMCNC: 32.5 G/DL (ref 31–36)
MCV RBC AUTO: 81.8 FL (ref 80–100)
METHEMOGLOBIN VENOUS: 0.3 %
MONOCYTES ABSOLUTE: 0.8 K/UL (ref 0–1.3)
MONOCYTES RELATIVE PERCENT: 6 %
NEUTROPHILS ABSOLUTE: 10.4 K/UL (ref 1.7–7.7)
NEUTROPHILS RELATIVE PERCENT: 75.8 %
O2 SAT, VEN: 89 %
O2 THERAPY: ABNORMAL
PCO2, VEN: 40.4 MMHG (ref 40–50)
PDW BLD-RTO: 16.6 % (ref 12.4–15.4)
PH VENOUS: 7.48 (ref 7.35–7.45)
PLATELET # BLD: 205 K/UL (ref 135–450)
PMV BLD AUTO: 8.2 FL (ref 5–10.5)
PO2, VEN: 51.1 MMHG (ref 25–40)
POTASSIUM REFLEX MAGNESIUM: 3.6 MMOL/L (ref 3.5–5.1)
PRO-BNP: 298 PG/ML (ref 0–124)
RBC # BLD: 5.45 M/UL (ref 4.2–5.9)
SARS-COV-2 RNA, RT PCR: NOT DETECTED
SODIUM BLD-SCNC: 140 MMOL/L (ref 136–145)
TCO2 CALC VENOUS: 31 MMOL/L
TOTAL PROTEIN: 6.6 G/DL (ref 6.4–8.2)
TROPONIN: <0.01 NG/ML
WBC # BLD: 13.8 K/UL (ref 4–11)

## 2021-09-05 PROCEDURE — 6360000004 HC RX CONTRAST MEDICATION: Performed by: EMERGENCY MEDICINE

## 2021-09-05 PROCEDURE — 80053 COMPREHEN METABOLIC PANEL: CPT

## 2021-09-05 PROCEDURE — 71045 X-RAY EXAM CHEST 1 VIEW: CPT

## 2021-09-05 PROCEDURE — 36415 COLL VENOUS BLD VENIPUNCTURE: CPT

## 2021-09-05 PROCEDURE — 87636 SARSCOV2 & INF A&B AMP PRB: CPT

## 2021-09-05 PROCEDURE — 85025 COMPLETE CBC W/AUTO DIFF WBC: CPT

## 2021-09-05 PROCEDURE — 99285 EMERGENCY DEPT VISIT HI MDM: CPT

## 2021-09-05 PROCEDURE — 83880 ASSAY OF NATRIURETIC PEPTIDE: CPT

## 2021-09-05 PROCEDURE — 82803 BLOOD GASES ANY COMBINATION: CPT

## 2021-09-05 PROCEDURE — 71260 CT THORAX DX C+: CPT

## 2021-09-05 PROCEDURE — 84484 ASSAY OF TROPONIN QUANT: CPT

## 2021-09-05 PROCEDURE — 6370000000 HC RX 637 (ALT 250 FOR IP): Performed by: EMERGENCY MEDICINE

## 2021-09-05 PROCEDURE — 93005 ELECTROCARDIOGRAM TRACING: CPT | Performed by: EMERGENCY MEDICINE

## 2021-09-05 RX ORDER — IPRATROPIUM BROMIDE AND ALBUTEROL SULFATE 2.5; .5 MG/3ML; MG/3ML
3 SOLUTION RESPIRATORY (INHALATION) ONCE
Status: COMPLETED | OUTPATIENT
Start: 2021-09-05 | End: 2021-09-05

## 2021-09-05 RX ADMIN — IPRATROPIUM BROMIDE AND ALBUTEROL SULFATE 3 AMPULE: .5; 3 SOLUTION RESPIRATORY (INHALATION) at 20:24

## 2021-09-05 RX ADMIN — IOPAMIDOL 85 ML: 755 INJECTION, SOLUTION INTRAVENOUS at 22:01

## 2021-09-05 NOTE — ED NOTES
Bed: 04  Expected date:   Expected time:   Means of arrival:   Comments:  JULIO C [place]  09/05/21 4661

## 2021-09-05 NOTE — ED PROVIDER NOTES
[address] ED      CHIEF COMPLAINT  Shortness of Breath (Pt states increased SOB for the past two days. Pt states that he had a pacemaker placed on Friday.)       HISTORY OF PRESENT ILLNESS  Pedro Perry is a 63 y.o. male with h/o ICM s/p CABG, HTN, HLD, morbid obesity, OANH, COPD, recently diagnosed with SSS s/p pacemaker placement 08/27/21 who presents to the emergency department for evaluation of shortness of breath. Patient reports he was having shortness of breath and lightheadedness before the pacemaker placement and after the pacemaker placement. However, reports it acutely got worse about 3 days ago. Says he did have a follow-up appointment that was scheduled with his cardiologist but since seeing his cardiologist, his symptoms acutely got worse and says he is unable to get up without feeling lightheaded or feeling like he is about to pass out. No other complaints, modifying factors or associated symptoms. I have reviewed the following from the nursing documentation.     Past Medical History:   Diagnosis Date    Acute diverticulitis 04/10/2021    Acute kidney injury ([address] 04/08/2021    Acute on chronic respiratory failure with hypoxemia (HCC)     Acute respiratory failure ([address] 08/19/2020    Acute respiratory failure with hypoxia ([address]     Anxiety 01/06/2020    Aortic valve calcification 09/2020    seen on lung CT    CAD in native artery 04/14/2021    Chronic obstructive pulmonary disease ([address] 09/03/2019    PFT done 9/03/19    Chronic systolic congestive heart failure ([address] 04/10/2021    Class 3 severe obesity with body mass index (BMI) of 45.0 to 49.9 in adult [facility])     Coronary artery calcification 09/2020    seen on lung ct    Coronary artery disease involving native heart with angina pectoris ([address] 09/22/2020    Crush injury of right foot 07/23/2015    DDD (degenerative disc disease), lumbar 01/06/2020    Depression 01/06/2020    Diverticulitis of colon 04/10/2021    Erectile dysfunction 01/06/2020    Fatigue 01/06/2020    HNP (herniated nucleus pulposus), lumbar 01/06/2020    Hyperglycemia 01/06/2020    Hyperlipidemia     Hypersomnia 01/06/2020    Hypertension     Insomnia     Ischemic cardiomyopathy     Kidney stone     Lumbar radiculopathy 01/06/2020    Lumbar spine pain 01/06/2020    LVH (left ventricular hypertrophy)     seen on echo 8/2020, moderate    Mobitz type I Wenckebach atrioventricular block 04/10/2021    Moderate protein-calorie malnutrition ([address] 03/17/2020    Observed sleep apnea 01/06/2020    OANH (obstructive sleep apnea) 01/06/2020    no C-pap is getting tested    Pneumonia, primary atypical     Reactive depression 01/06/2020    S/P three vessel coronary artery bypass 10/26/2020    Spondylosis without myelopathy or radiculopathy, lumbar region 01/06/2020    Tobacco abuse 01/06/2020    Tobacco use 01/06/2020    Wears dentures     full set     Past Surgical History:   Procedure Laterality Date    ANKLE SURGERY Left 8/2/2021    LEFT FOOT SCREW REMOVAL performed by Hina Martini MD at [facility]  03/2021    CORONARY ARTERY BYPASS GRAFT N/A 9/25/2020    CORONARY ARTERY BYPASS GRAFTING X3, INTERNAL MAMMARY ARTERY, SAPHENOUS VEIN GRAFT, ON PUMP, STERNAL PLATING, 5 LEVEL BILATERAL INTERCOSTAL NERVE BLOCK, PLATELET GEL APPLICATION performed by Alonso Villarreal MD at [facility]  03/16/2011    cystoscopy left ureteroscopy, left retrograde, double J stent placement    FINGER AMPUTATION Right 2/2/2021    RIGHT MIDDLE FINGER IRRIGATION AND DEBRIDEMENT WITH REVISION AMPUTATION AND BONE SHORTENING, POSSIBLE NAIL ABLATION performed by Narayan Roe MD at [facility] Right 12/24/2019    RIGHT LUMBAR FIVE SACRAL ONE TRANSFORAMINAL EPIDURAL STEROID INJECTION SITE CONFIRMED BY FLUOROSCOPY performed by Melodie Saldana MD at [facility]    PAIN MANAGEMENT PROCEDURE Right 2020    RIGHT LUMBAR FOUR AND LUMBAR FIVE TRANSFORAMINAL EPIDURAL STEROID INJECTION SITE CONFIRMED BY FLUOROSCOPY performed by Marlen Grimaldo MD at [place] [address]     Family History   Problem Relation Age of Onset    Heart Disease Mother     Other Mother         copd    High Blood Pressure Sister     No Known Problems Sister      Social History     Socioeconomic History    Marital status:      Spouse name: Not on file    Number of children: Not on file    Years of education: Not on file    Highest education level: Not on file   Occupational History    Not on file   Tobacco Use    Smoking status: Former Smoker     Packs/day: 2.00     Years: 35.00     Pack years: 70.00     Types: Cigarettes     Quit date: 3/1/2020     Years since quittin.5    Smokeless tobacco: Never Used   Vaping Use    Vaping Use: Never used   Substance and Sexual Activity    Alcohol use: No    Drug use: Not Currently     Types: Marijuana, Cocaine     Comment: hx of drug use 30 yrs ago    Sexual activity: Not Currently   Other Topics Concern    Not on file   Social History Narrative    Not on file     Social Determinants of Health     Financial Resource Strain:     Difficulty of Paying Living Expenses:    Food Insecurity:     Worried About Running Out of Food in the Last Year:     [address] in the Last Year:    Transportation Needs:     Lack of Transportation (Medical):      Lack of Transportation (Non-Medical):    Physical Activity:     Days of Exercise per Week:     Minutes of Exercise per Session:    Stress:     Feeling of Stress :    Social Connections:     Frequency of Communication with Friends and Family:     Frequency of Social Gatherings with Friends and Family:     Attends [employer] Services:     Active Member of Clubs or Organizations:     Attends Club or Organization Meetings:     Marital Status:    Intimate Partner Violence:     Fear of Current or Ex-Partner:     Emotionally Abused:     Physically Abused:     Sexually Abused:      No current facility-administered medications for this encounter.      Current Outpatient Medications   Medication Sig Dispense Refill    predniSONE (DELTASONE) 20 MG tablet Take 2 tablets by mouth daily for 5 days 10 tablet 0    benzonatate (TESSALON) 200 MG capsule TAKE ONE CAPSULE BY MOUTH THREE TIMES A DAY AS NEEDED FOR COUGH 30 capsule 0    sertraline (ZOLOFT) 100 MG tablet TAKE ONE TABLET BY MOUTH DAILY 30 tablet 2    fluticasone-umeclidin-vilant (TRELEGY ELLIPTA) 100-62.5-25 MCG/INH AEPB Inhale 1 puff into the lungs daily 60 each 3    losartan (COZAAR) 25 MG tablet Take 0.5 tablets by mouth daily 30 tablet 5    OXYGEN Inhale 2 L into the lungs as needed       meclizine (ANTIVERT) 25 MG tablet Take 1 tablet by mouth 3 times daily as needed for Dizziness 30 tablet 0    nitroGLYCERIN (NITROSTAT) 0.4 MG SL tablet Place 1 tablet under the tongue every 5 minutes as needed for Chest pain 25 tablet 3    furosemide (LASIX) 80 MG tablet Take 0.5 tablets by mouth daily 45 tablet 3    metoprolol succinate (TOPROL XL) 25 MG extended release tablet Take 1 tablet by mouth daily 30 tablet 11    ipratropium (ATROVENT) 0.02 % nebulizer solution Take 2.5 mLs by nebulization 3 times daily 225 mL 3    traZODone (DESYREL) 100 MG tablet Take 1 tablet by mouth nightly (Patient taking differently: Take 100 mg by mouth 2 times daily Am pm) 30 tablet 5    clopidogrel (PLAVIX) 75 MG tablet Take 1 tablet by mouth daily 90 tablet 3    atorvastatin (LIPITOR) 80 MG tablet Take 1 tablet by mouth nightly (Patient taking differently: Take 40 mg by mouth nightly ) 90 tablet 3    aspirin 81 MG EC tablet Take 1 tablet by mouth daily 30 tablet 0    albuterol (PROVENTIL) (2.5 MG/3ML) 0.083% nebulizer solution Take 3 mLs by nebulization every 6 hours as needed for Wheezing or Shortness of Breath 25 vial 1    albuterol sulfate HFA (PROVENTIL HFA) 108 (90 Base) MCG/ACT inhaler Inhale 2 puffs into the lungs every 6 hours as needed for Wheezing 1 Inhaler 0     Allergies   Allergen Reactions    Dilaudid [Hydromorphone Hcl] Nausea And Vomiting    Codeine Itching       REVIEW OF SYSTEMS  10 systems reviewed, pertinent positives per HPI otherwise noted to be negative. PHYSICAL EXAM  /62   Pulse 85   Temp 98.3 °F (36.8 °C) (Oral)   Resp 15   Ht 5' 4\" (1.626 m)   Wt 292 lb (132.5 kg)   SpO2 95%   BMI 50.12 kg/m²    GENERAL APPEARANCE: Awake and alert. Obese. HENT: Normocephalic. Atraumatic. PERRL. EOMI. No facial droop. HEART/CHEST: RRR. Pacemaker placement site healing well with no surrounding erythema or fluctuance. No purulent drainage. LUNGS: Respirations unlabored. Speaking comfortably in full sentences. ABDOMEN: Soft, non-distended abdomen. Non tender to palpation. No guarding. No rebound. EXTREMITIES: No gross deformities. Moving all extremities. Minimal lower extremity edema. Negative ngoc sign bilaterally. Palpable pulses to all extremities. SKIN: Warm and dry. No acute rashes. NEUROLOGICAL: Alert and oriented. No gross facial drooping. Answering questions appropriately. Moving all extremities. PSYCHIATRIC: Pleasant. Normal mood and affect.     LABS  Results for orders placed or performed during the hospital encounter of 09/05/21   COVID-19 & Influenza Combo    Specimen: Nasopharyngeal Swab; Nasal   Result Value Ref Range    SARS-CoV-2 RNA, RT PCR NOT DETECTED NOT DETECTED    INFLUENZA A NOT DETECTED NOT DETECTED    INFLUENZA B NOT DETECTED NOT DETECTED   CBC auto differential   Result Value Ref Range    WBC 13.8 (H) 4.0 - 11.0 K/uL    RBC 5.45 4.20 - 5.90 M/uL    Hemoglobin 14.5 13.5 - 17.5 g/dL    Hematocrit 44.6 40.5 - 52.5 %    MCV 81.8 80.0 - 100.0 fL    MCH 26.6 26.0 - 34.0 pg    MCHC 32.5 31.0 - 36.0 g/dL    RDW 16.6 (H) 12.4 - 15.4 %    Platelets 001 188 - 953 K/uL    MPV 8.2 5.0 - 10.5 fL    Neutrophils % 75.8 %    Lymphocytes % 14.5 %    Monocytes % 6.0 %    Eosinophils % 2.5 %    Basophils % 1.2 %    Neutrophils Absolute 10.4 (H) 1.7 - 7.7 K/uL    Lymphocytes Absolute 2.0 1.0 - 5.1 K/uL    Monocytes Absolute 0.8 0.0 - 1.3 K/uL    Eosinophils Absolute 0.3 0.0 - 0.6 K/uL    Basophils Absolute 0.2 0.0 - 0.2 K/uL   Brain Natriuretic Peptide   Result Value Ref Range    Pro- (H) 0 - 124 pg/mL   Blood gas, venous   Result Value Ref Range    pH, Evan 7.483 (H) 7.350 - 7.450    pCO2, Evan 40.4 40.0 - 50.0 mmHg    pO2, Evan 51.1 (H) 25 - 40 mmHg    HCO3, Venous 29.6 (H) 23.0 - 29.0 mmol/L    Base Excess, Evan 5.7 (H) -3.0 - 3.0 mmol/L    O2 Sat, Evan 89 Not Established %    Carboxyhemoglobin 2.5 (H) 0.0 - 1.5 %    MetHgb, Evan 0.3 <1.5 %    TC02 (Calc), Evan 31 Not Established mmol/L    O2 Therapy Unknown    Comprehensive Metabolic Panel w/ Reflex to MG   Result Value Ref Range    Sodium 140 136 - 145 mmol/L    Potassium reflex Magnesium 3.6 3.5 - 5.1 mmol/L    Chloride 100 99 - 110 mmol/L    CO2 31 21 - 32 mmol/L    Anion Gap 9 3 - 16    Glucose 96 70 - 99 mg/dL    BUN 13 7 - 20 mg/dL    CREATININE 0.9 0.9 - 1.3 mg/dL    GFR Non-African American >60 >60    GFR African American >60 >60    Calcium 8.9 8.3 - 10.6 mg/dL    Total Protein 6.6 6.4 - 8.2 g/dL    Albumin 3.4 3.4 - 5.0 g/dL    Albumin/Globulin Ratio 1.1 1.1 - 2.2    Total Bilirubin 0.5 0.0 - 1.0 mg/dL    Alkaline Phosphatase 98 40 - 129 U/L    ALT 18 10 - 40 U/L    AST 18 15 - 37 U/L    Globulin 3.2 g/dL   Troponin   Result Value Ref Range    Troponin <0.01 <0.01 ng/mL   EKG 12 Lead   Result Value Ref Range    Ventricular Rate 70 BPM    Atrial Rate 70 BPM    P-R Interval 190 ms    QRS Duration 74 ms    Q-T Interval 386 ms    QTc Calculation (Bazett) 416 ms    P Axis 34 degrees    R Axis -43 degrees    T Axis 91 degrees    Diagnosis       Normal sinus rhythmLeft axis deviationLow voltage QRSInferior infarct (cited on or before 19-APR-2021)Cannot rule out Anterior FINDINGS: The heart is mildly enlarged but unchanged. The pulmonary vessels are slightly engorged centrally and more prominent. The lungs are hyperinflated and emphysematous. No consolidation or effusion is seen. There is a left pacemaker in place which is unchanged. Stable mild cardiomegaly with minimal central pulmonary congestion or pulmonary artery hypertension which is more prominent. COPD with no acute infiltrate or effusion. CT CHEST PULMONARY EMBOLISM W CONTRAST    Result Date: 9/5/2021  EXAMINATION: CTA OF THE CHEST 9/5/2021 10:00 pm TECHNIQUE: CTA of the chest was performed after the administration of intravenous contrast.  Multiplanar reformatted images are provided for review. MIP images are provided for review. Dose modulation, iterative reconstruction, and/or weight based adjustment of the mA/kV was utilized to reduce the radiation dose to as low as reasonably achievable. COMPARISON: 04/21/2021 HISTORY: ORDERING SYSTEM PROVIDED HISTORY: soa. [facility] PROVIDED HISTORY: Reason for exam:->soa. [facility] Decision Support Exception - unselect if not a suspected or confirmed emergency medical condition->Emergency Medical Condition (MA) Reason for Exam: SOB, dizzy Acuity: Acute Type of Exam: Ongoing Additional signs and symptoms: r/o PE FINDINGS: Pulmonary Arteries: Pulmonary arteries are adequately opacified for evaluation. No evidence of intraluminal filling defect to suggest pulmonary embolism. Main pulmonary artery is normal in caliber. Mediastinum: No evidence of mediastinal lymphadenopathy. The heart and pericardium demonstrate no acute abnormality. There is no acute abnormality of the thoracic aorta. There are postop changes along the mediastinum and sternum there is a left pacemaker in place in good position. There are moderate coronary artery calcifications.   There is mild calcified plaque throughout the aorta which is minimally dilated throughout with no aneurysm or dissection. Lungs/pleura: The lungs are mildly hyperinflated. No consolidation or effusion is seen. The bones are intact. There is no pulmonary nodule or mass. Upper Abdomen: The gallbladder has been removed with clips in the gallbladder fossa. The adrenals are normal.  The liver is mildly enlarged with hypertrophy of the caudate and left lobes which is unchanged Soft Tissues/Bones: No acute bone or soft tissue abnormality. No evidence of a pulmonary embolus. Mildly dilated and atherosclerotic thoracic aorta with no aneurysm or dissection. Status post CABG surgery with extensive coronary artery calcifications. No mediastinal mass or adenopathy is seen. Chronic obstructive lung changes with no acute pulmonary abnormality. ED COURSE/MDM  Patient seen and evaluated. At presentation, patient was awake, alert, afebrile, hemodynamically stable, and satting well on room air. Patient was placed on cardiac monitor for close observation. DDx includes ACS, arrhythmia, PE, aortic dissection, among others. Cbc, cmp troponin, vbg, bnp, and ct pe obtained. His pacemaker was interrogated. There was no significant arrhythmia seen. Troponin is negative. Creatinine normal.  BNP elevated at 298 but no significant change compared to baseline. COVID-19 swab was negative. CT PE obtained and shows no pulmonary embolism. There was mildly dilated atherosclerotic thoracic aorta with no aneurysm or dissection. As patient reports he is not able to do any activities of daily living due to worsening shortness of breath and given his significant cardiac history, cardiology was consulted. I spoke with Dr. Flower Montana who has no acute recommendation other than possibly getting admtited to [facility] so he could be evaluated by his cardiologist in the morning. Hospitalist consulted for evaluation. Hospitalist, Dr. Freddie Holguin came down to the ED to evaluate the patient at bedside.  After evaluation and discussion with patient wife, hospitalist recommends discharge home with prednisone for 5 days and close follow up with pulmonology and cardiology on Tuesday. When I came in to the room, at this time, patient reports he is not feeling well but said his symptoms have been stable since seeing his cardiologist and just wanted to make sure there was nothing new since it was labor day weekend and he was unable to get back in with his cardiologist. He said he does feel lightheaded when he stands up but says he is able to get around. Wife says she has been helping him around the house since pace maker placement and says she has no concerns about taking him home and that his SOA has bene stable. Patient at this time reports he feels stable to go home. Wife denies having any concerns about discharge home. They will follow up with cardiology and pulmonology on Tuesday. Wife reports she is able to bring back the patient if any change in symptoms. He was discharged home with strict return precautions. Pt was seen during the [place] 19 pandemic. Appropriate PPE worn by ME during patient encounters. Pt seen during a time with constrained hospital bed capacity and other potential inpatient and outpatient resources were constrained due to the viral pandemic. During the patient's ED course, the patient was given:  Medications   ipratropium-albuterol (DUONEB) nebulizer solution 3 ampule (3 ampules Inhalation Given 9/5/21 2024)   iopamidol (ISOVUE-370) 76 % injection 85 mL (85 mLs IntraVENous Given 9/5/21 2201)   predniSONE (DELTASONE) tablet 40 mg (40 mg Oral Given 9/6/21 0016)        CLINICAL IMPRESSION  1. Dyspnea, unspecified type    2. COPD exacerbation (HCC)        Blood pressure 122/62, pulse 85, temperature 98.3 °F (36.8 °C), temperature source Oral, resp. rate 15, height 5' 4\" (1.626 m), weight 292 lb (132.5 kg), SpO2 95 %. DISPOSITION  Kp Mckee was discharged home in stable condition.      Patient was given scripts for the following medications. I counseled patient how to take these medications. Discharge Medication List as of 9/6/2021 12:19 AM      START taking these medications    Details   predniSONE (DELTASONE) 20 MG tablet Take 2 tablets by mouth daily for 5 days, Disp-10 tablet, R-0Print             Follow-up with:  Ramirez Pereira MD  [address] [place] [address]  [phone]    In 2 days        DISCLAIMER: This chart was created using Dragon dictation software. Efforts were made by me to ensure accuracy, however some errors may be present due to limitations of this technology and occasionally words are not transcribed correctly.         Justyn Cisneros MD  09/06/21 [id number]

## 2021-09-06 VITALS
SYSTOLIC BLOOD PRESSURE: 122 MMHG | HEART RATE: 85 BPM | HEIGHT: 64 IN | TEMPERATURE: 98.3 F | OXYGEN SATURATION: 95 % | WEIGHT: 292 LBS | RESPIRATION RATE: 15 BRPM | DIASTOLIC BLOOD PRESSURE: 62 MMHG | BODY MASS INDEX: 49.85 KG/M2

## 2021-09-06 PROCEDURE — 6370000000 HC RX 637 (ALT 250 FOR IP): Performed by: EMERGENCY MEDICINE

## 2021-09-06 RX ORDER — PREDNISONE 20 MG/1
40 TABLET ORAL DAILY
Qty: 10 TABLET | Refills: 0 | Status: SHIPPED | OUTPATIENT
Start: 2021-09-06 | End: 2021-09-11

## 2021-09-06 RX ORDER — PREDNISONE 20 MG/1
40 TABLET ORAL ONCE
Status: COMPLETED | OUTPATIENT
Start: 2021-09-06 | End: 2021-09-06

## 2021-09-06 RX ADMIN — PREDNISONE 40 MG: 20 TABLET ORAL at 00:16

## 2021-09-06 NOTE — PROGRESS NOTES
Jeniffer Mccray  [birth year]    Pt p/w SOB/LEE, lightheadedness x several weeks. He recently got a pacemaker under the care of Dr. Galen Alvarez and had a limited echo in the f/u visit on 9/2. No obvious pathology on CTA chest including PE, pericardial effusion or pulm edema. He also sees Dr. Brit Galicia. He has been off of his long acting pulm meds for about a year 2/2 cost and lack of insurance. He now has insurance through [employer]. There is a telephone encounter in Epic where they are working on getting him back on pulm meds that Medicaid will pay for. He does have supplies/Rx for his nebulizer including albuterol and Duoneb at home. Will d/w pulm on call in the am to get him in for f/u sooner than his current Oct appt. He has PRN O2 at home which he has not been wearing. W/u in the ER did not demonstrate clear cause for his prolonged SOB/LEE. Pt got the J&J COVID vaccine on 8/27/21 but likely is not immune at this time. He does not wish to stay in the hospital 2/2 concern of catching COVID. I think this is reasonable as there is not clear cause of abnormality/pathology to treat IP acutely as long as we can get him close f/u. Rec: steroid taper, [address] Duoneb q4hWA, PRN albuterol. Use his walker and O2 w/ ambulation for now.  maintenance pulm Rx.

## 2021-09-06 NOTE — ED NOTES
Consult to Cardiology at 2026    Consult completed with Dr. Tarun Munson calling 2029     Dahlia Reyna  09/05/21 2032

## 2021-09-07 ENCOUNTER — TELEPHONE (OUTPATIENT)
Dept: OTHER | Facility: CLINIC | Age: 61
End: 2021-09-07

## 2021-09-07 LAB
EKG ATRIAL RATE: 70 BPM
EKG DIAGNOSIS: NORMAL
EKG P AXIS: 34 DEGREES
EKG P-R INTERVAL: 190 MS
EKG Q-T INTERVAL: 386 MS
EKG QRS DURATION: 74 MS
EKG QTC CALCULATION (BAZETT): 416 MS
EKG R AXIS: -43 DEGREES
EKG T AXIS: 91 DEGREES
EKG VENTRICULAR RATE: 70 BPM

## 2021-09-07 PROCEDURE — 93010 ELECTROCARDIOGRAM REPORT: CPT | Performed by: INTERNAL MEDICINE

## 2021-09-09 ENCOUNTER — OFFICE VISIT (OUTPATIENT)
Dept: PULMONOLOGY | Age: 61
End: 2021-09-09
Payer: COMMERCIAL

## 2021-09-09 VITALS
WEIGHT: 284 LBS | HEIGHT: 64 IN | BODY MASS INDEX: 48.49 KG/M2 | OXYGEN SATURATION: 96 % | RESPIRATION RATE: 24 BRPM | SYSTOLIC BLOOD PRESSURE: 122 MMHG | DIASTOLIC BLOOD PRESSURE: 60 MMHG | TEMPERATURE: 96.4 F | HEART RATE: 64 BPM

## 2021-09-09 DIAGNOSIS — G47.10 HYPERSOMNIA: ICD-10-CM

## 2021-09-09 DIAGNOSIS — J40 TRACHEOBRONCHITIS: ICD-10-CM

## 2021-09-09 DIAGNOSIS — R06.02 SOB (SHORTNESS OF BREATH): ICD-10-CM

## 2021-09-09 DIAGNOSIS — J44.1 COPD WITH ACUTE EXACERBATION (HCC): Primary | ICD-10-CM

## 2021-09-09 DIAGNOSIS — G47.30 OBSERVED SLEEP APNEA: ICD-10-CM

## 2021-09-09 PROCEDURE — 3017F COLORECTAL CA SCREEN DOC REV: CPT | Performed by: INTERNAL MEDICINE

## 2021-09-09 PROCEDURE — 99214 OFFICE O/P EST MOD 30 MIN: CPT | Performed by: INTERNAL MEDICINE

## 2021-09-09 PROCEDURE — G8427 DOCREV CUR MEDS BY ELIG CLIN: HCPCS | Performed by: INTERNAL MEDICINE

## 2021-09-09 PROCEDURE — 1036F TOBACCO NON-USER: CPT | Performed by: INTERNAL MEDICINE

## 2021-09-09 PROCEDURE — G8926 SPIRO NO PERF OR DOC: HCPCS | Performed by: INTERNAL MEDICINE

## 2021-09-09 PROCEDURE — G8417 CALC BMI ABV UP PARAM F/U: HCPCS | Performed by: INTERNAL MEDICINE

## 2021-09-09 PROCEDURE — 3023F SPIROM DOC REV: CPT | Performed by: INTERNAL MEDICINE

## 2021-09-09 RX ORDER — DOXYCYCLINE HYCLATE 100 MG/1
100 CAPSULE ORAL 2 TIMES DAILY
Qty: 14 CAPSULE | Refills: 0 | Status: SHIPPED | OUTPATIENT
Start: 2021-09-09 | End: 2021-09-16

## 2021-09-09 RX ORDER — ALBUTEROL SULFATE 2.5 MG/3ML
2.5 SOLUTION RESPIRATORY (INHALATION) EVERY 6 HOURS PRN
Qty: 360 EACH | Refills: 3 | Status: SHIPPED | OUTPATIENT
Start: 2021-09-09 | End: 2022-04-11 | Stop reason: SDUPTHER

## 2021-09-09 ASSESSMENT — SLEEP AND FATIGUE QUESTIONNAIRES
HOW LIKELY ARE YOU TO NOD OFF OR FALL ASLEEP WHILE SITTING INACTIVE IN A PUBLIC PLACE: 2
HOW LIKELY ARE YOU TO NOD OFF OR FALL ASLEEP WHEN YOU ARE A PASSENGER IN A CAR FOR AN HOUR WITHOUT A BREAK: 3
HOW LIKELY ARE YOU TO NOD OFF OR FALL ASLEEP WHILE LYING DOWN TO REST IN THE AFTERNOON WHEN CIRCUMSTANCES PERMIT: 3
NECK CIRCUMFERENCE (INCHES): 18.5
HOW LIKELY ARE YOU TO NOD OFF OR FALL ASLEEP WHILE WATCHING TV: 3
ESS TOTAL SCORE: 16
HOW LIKELY ARE YOU TO NOD OFF OR FALL ASLEEP IN A CAR, WHILE STOPPED FOR A FEW MINUTES IN TRAFFIC: 0
HOW LIKELY ARE YOU TO NOD OFF OR FALL ASLEEP WHILE SITTING AND READING: 3
HOW LIKELY ARE YOU TO NOD OFF OR FALL ASLEEP WHILE SITTING AND TALKING TO SOMEONE: 0
HOW LIKELY ARE YOU TO NOD OFF OR FALL ASLEEP WHILE SITTING QUIETLY AFTER LUNCH WITHOUT ALCOHOL: 2

## 2021-09-09 NOTE — PROGRESS NOTES
[facility]                                                                CHIEF COMPLAINT: Follow-up ED visit      HPI:   PFTs were reviewed by me and noted. Results were dicussed with patient and multiple good questions were answered. Patient was seen in ED 9/5/2021 for shortness of breath. CTPA showed no PE. Was sent home on Prednisone taper  Feels worse   Some cough with yellow green sputum  No hemoptysis   Patient has not had his HST/PSG  Uses Albuterol BID with some help   Uses O2 at home   Started Trelegy         From prior visit:   64years old with chest pain being followed by Dr. Michelle Salter here for sleep apnea evaluation and shortness of breath. Wakes up at night choking and gasping for air for 3.5 years, severe at times. Associated with observed sleep apnea and hypersomnia. No snoring. Better when he sits up and worse when laying down. No restorative sleep. + dry mouth upon awakening. Patient is complaining of daytime sleepiness, fatigue and tiredness at times during the day. Bedtime 8-10 pm and rise time is 5:30 am. Sleep onset 60 minutes. Watches TV in bedroom. + morning headache. 1 nocturia. Wakes up 4-5 times at night. 2-3 nap/weekf or 30-45 min. No car wrecks or near wrecks because of the sleepiness. No nodding off while driving. LEE for 3.5 years, mild. Dyspnea worse with exertion and better with resting. Albuterol helps his breathing. Uses 2-3 times/day. Associated with cough and wheezes. Able to walk mail box and back.  Smoker 1 ppd for the past 45 years- smokes now 1.5 ppd     Old records reviewed by me and [id number]        Past Medical History:   Diagnosis Date    Acute diverticulitis 04/10/2021    Acute kidney injury ([address] 04/08/2021    Acute on chronic respiratory failure with hypoxemia (HCC)     Acute respiratory failure ([address] 08/19/2020    Acute respiratory failure with hypoxia (HCC)     Anxiety 01/06/2020    Aortic valve calcification 09/2020    seen on lung CT    CAD in native artery 04/14/2021    Chronic obstructive pulmonary disease ([facility] 09/03/2019    PFT done 9/03/19    Chronic systolic congestive heart failure ([address] 04/10/2021    Class 3 severe obesity with body mass index (BMI) of 45.0 to 49.9 in adult [facility])     Coronary artery calcification 09/2020    seen on lung ct    Coronary artery disease involving native heart with angina pectoris ([address] 09/22/2020    Crush injury of right foot 07/23/2015    DDD (degenerative disc disease), lumbar 01/06/2020    Depression 01/06/2020    Diverticulitis of colon 04/10/2021    Erectile dysfunction 01/06/2020    Fatigue 01/06/2020    HNP (herniated nucleus pulposus), lumbar 01/06/2020    Hyperglycemia 01/06/2020    Hyperlipidemia     Hypersomnia 01/06/2020    Hypertension     Insomnia     Ischemic cardiomyopathy     Kidney stone     Lumbar radiculopathy 01/06/2020    Lumbar spine pain 01/06/2020    LVH (left ventricular hypertrophy)     seen on echo 8/2020, moderate    Mobitz type I Wenckebach atrioventricular block 04/10/2021    Moderate protein-calorie malnutrition ([facility] [address] 03/17/2020    Observed sleep apnea 01/06/2020    OANH (obstructive sleep apnea) 01/06/2020    no C-pap is getting tested    Pneumonia, primary atypical     Reactive depression 01/06/2020    S/P three vessel coronary artery bypass 10/26/2020    Spondylosis without myelopathy or radiculopathy, lumbar region 01/06/2020    Tobacco abuse 01/06/2020    Tobacco use 01/06/2020    Wears dentures     full set       Past Surgical History:        Procedure Laterality Date    ANKLE SURGERY Left 8/2/2021    LEFT FOOT SCREW REMOVAL performed by Ricardo Diaz MD at [address]  03/2021    CORONARY ARTERY BYPASS GRAFT N/A 9/25/2020    CORONARY ARTERY BYPASS GRAFTING X3, INTERNAL MAMMARY ARTERY, SAPHENOUS VEIN GRAFT, ON PUMP, STERNAL PLATING, 5 LEVEL BILATERAL INTERCOSTAL NERVE BLOCK, PLATELET GEL APPLICATION performed by Luke Wilkins MD at [address] [facility]  03/16/2011    cystoscopy left ureteroscopy, left retrograde, double J stent placement    FINGER AMPUTATION Right 2/2/2021    RIGHT MIDDLE FINGER IRRIGATION AND DEBRIDEMENT WITH REVISION AMPUTATION AND BONE SHORTENING, POSSIBLE NAIL ABLATION performed by Nirav Espitia MD at [facility] Right 12/24/2019    RIGHT LUMBAR FIVE SACRAL ONE TRANSFORAMINAL EPIDURAL STEROID INJECTION SITE CONFIRMED BY FLUOROSCOPY performed by Demar Shukla MD at [address] [facility] Right 1/7/2020    RIGHT LUMBAR FOUR AND LUMBAR FIVE TRANSFORAMINAL EPIDURAL STEROID INJECTION SITE CONFIRMED BY FLUOROSCOPY performed by Demar Shukla MD at [place] [address]       Allergies:  is allergic to dilaudid [hydromorphone hcl] and codeine. Social History:    TOBACCO:   reports that he quit smoking about 18 months ago. His smoking use included cigarettes. He has a 70.00 pack-year smoking history. He has never used smokeless tobacco.  ETOH:   reports no history of alcohol use.       Family History:       Problem Relation Age of Onset    Heart Disease Mother     Other Mother         copd    High Blood Pressure Sister     No Known Problems Sister        Current Medications:    Current Outpatient Medications:     predniSONE (DELTASONE) 20 MG tablet, Take 2 tablets by mouth daily for 5 days, Disp: 10 tablet, Rfl: 0    benzonatate (TESSALON) 200 MG capsule, TAKE ONE CAPSULE BY MOUTH THREE TIMES A DAY AS NEEDED FOR COUGH, Disp: 30 capsule, Rfl: 0    fluticasone-umeclidin-vilant (TRELEGY ELLIPTA) 100-62.5-25 MCG/INH AEPB, Inhale 1 puff into the lungs daily, Disp: 60 each, Rfl: 3    albuterol (PROVENTIL) (2.5 MG/3ML) 0.083% nebulizer solution, Take 3 mLs by nebulization every 6 hours as needed for Wheezing or Shortness of Breath, Disp: 25 vial, Rfl: 1    albuterol sulfate HFA (PROVENTIL HFA) 108 (90 Base) MCG/ACT inhaler, Inhale 2 puffs into the lungs every 6 hours as needed for Wheezing, Disp: 1 Inhaler, Rfl: 0    sertraline (ZOLOFT) 100 MG tablet, TAKE ONE TABLET BY MOUTH DAILY, Disp: 30 tablet, Rfl: 2    losartan (COZAAR) 25 MG tablet, Take 0.5 tablets by mouth daily, Disp: 30 tablet, Rfl: 5    OXYGEN, Inhale 2 L into the lungs as needed , Disp: , Rfl:     meclizine (ANTIVERT) 25 MG tablet, Take 1 tablet by mouth 3 times daily as needed for Dizziness, Disp: 30 tablet, Rfl: 0    nitroGLYCERIN (NITROSTAT) 0.4 MG SL tablet, Place 1 tablet under the tongue every 5 minutes as needed for Chest pain, Disp: 25 tablet, Rfl: 3    furosemide (LASIX) 80 MG tablet, Take 0.5 tablets by mouth daily, Disp: 45 tablet, Rfl: 3    metoprolol succinate (TOPROL XL) 25 MG extended release tablet, Take 1 tablet by mouth daily, Disp: 30 tablet, Rfl: 11    ipratropium (ATROVENT) 0.02 % nebulizer solution, Take 2.5 mLs by nebulization 3 times daily, Disp: 225 mL, Rfl: 3    traZODone (DESYREL) 100 MG tablet, Take 1 tablet by mouth nightly (Patient taking differently: Take 100 mg by mouth 2 times daily Am pm), Disp: 30 tablet, Rfl: 5    clopidogrel (PLAVIX) 75 MG tablet, Take 1 tablet by mouth daily, Disp: 90 tablet, Rfl: 3    atorvastatin (LIPITOR) 80 MG tablet, Take 1 tablet by mouth nightly (Patient taking differently: Take 40 mg by mouth nightly ), Disp: 90 tablet, Rfl: 3    aspirin 81 MG EC tablet, Take 1 tablet by mouth daily, Disp: 30 tablet, Rfl: 0      Objective:   PHYSICAL EXAM:    /60   Pulse 64   Temp 96.4 °F (35.8 °C)   Resp 24   Ht 5' 4\" (1.626 m)   Wt 284 lb (128.8 kg)   SpO2 96% Comment: with RA  BMI 48.75 kg/m²  on  RA   Gen: No distress. Eyes: PERRL. No sclera icterus. No conjunctival injection. ENT: No discharge. Pharynx clear. Mallampati class IV. Neck: Trachea midline. No obvious mass.    Resp: No accessory muscle use. No crackles. No wheezes. No rhonchi. No dullness on percussion. Good air entry. CV: Regular rate. Regular rhythm. No murmur or rub. 1 + LE edema. GI: Non-tender. Non-distended. No hernia. Skin: Warm and dry. No nodule on exposed extremities. Lymph: No cervical LAD. No supraclavicular LAD. M/S: No cyanosis. No joint deformity. No clubbing. Neuro: Awake. Alert. Moves all four extremities. Psych: Oriented x 3. No anxiety. DATA reviewed by me:   PFTs 07/26/2021 FVC 2.82 (70%) FEV1 1.73 (57%) FEV1/FVC 61% TLC 5.40 (87%) DLCO 18.36 (65%)  PFTs 09/30/2020 FVC 2.61 (68%) FEV1 1.96 (66%) FEV1/FVC 75% TLC 6.36 (105%) DLCO 22.12 (81%) 6MW F Res Ex    CT chest 9/18/2020   No PE or aortic dissection  No adenopathy  No infiltrates or effusion    CTPA 4/21/2021  Pulmonary Arteries: Motion artifact degrades image quality. There is no  acute pulmonary thromboembolus. Mediastinum: Status post median sternotomy and CABG. There are no enlarged  thoracic lymph nodes. Lungs/pleura: Secretions layer within the mid trachea. There is no  pneumothorax. There is a trace left pleural effusion bibasilar atelectasis. No change in the multiple 3 mm solid bilateral upper lobe pulmonary nodules,  no follow-up imaging is recommended. Upper Abdomen: Status post cholecystectomy. Soft Tissues/Bones: Degenerative changes involve the thoracic spine. There  is an old healed left rib fracture. Moderate subcutaneous inflammatory stranding is present throughout the left  upper chest wall and axilla with mild associated skin thickening. There is  no drainable fluid collection.     CTPA 9/5/2021 imaging reviewed by me and showed  No PE  Mildly dilated thoracic aorta  Post CABG  No mediastinal adenopathy  COPD with no acute abnormalities          LE doppler 4/29/21 negative DVT       TSH 0.9    Assessment:       · Moderate COPD with AE  · Tracheobronchitis   · Hypersomnia and observed sleep apnea  · Dyspnea on exertion-multifactorial due to obesity, deconditioning,  underlying COPD and cardiac. Dyspnea is out of proportion to her underlying lung disease  · Bradycardia, AV block, ischemic cardiomyopathy post CABG September 2020 followed by cardiology  · 45 pack year smoking- Quit March 2020         Plan:       6MW   Prednisone taper  Doxycycline 100 mg PO BID for 7 days. Continue Trelegy and Albuterol 2 puffs Q4-6 hrs PRN  Albuterol INH/Neb Q 4 hrs PRN   Advised to follow-up with cardiology evaluate for cardiac etiology for the shortness of breath, given lack of significant improvement with maximum pulmonary medicine. O2 0-2 LPM on exertion. Advised to titrate O2 using her pulse oximeter- target O2 sat 90-92%. Advised to get his Pneumococcal vaccine and influenza vaccine this year   Declined Covid vaccine. Risks, benefits and side effects were discussed with patient.   Advised to continue with smoking cessation  PSG evaluate for sleep related breathing disorder- willing to schedule now   Follow up in 3 months or sooner if needed

## 2021-09-14 ENCOUNTER — TELEPHONE (OUTPATIENT)
Dept: FAMILY MEDICINE CLINIC | Age: 61
End: 2021-09-14

## 2021-09-14 NOTE — TELEPHONE ENCOUNTER
Pt had an appointment scheduled on 9/16 for a hospital follow up. He was getting really upset with me said that he is not going to have a VV with a provider. They can not tell what is going on with them by doing this. He wants a face to face appointment with you. Also he said that he was going to get another provider so they could see him face to face explained to him that most all of them are doing VV do to the covid.

## 2021-09-14 NOTE — TELEPHONE ENCOUNTER
Pt called back, stating he called earlier and has not heard back from anyone, and wanted to know if a message had been sent back to his provider. I explained that someone did put in a message, but I do not see a response yet. As they pt had said earlier, he is going to just look for a new provider, stating he needs to be seen in person.

## 2021-09-15 ENCOUNTER — HOSPITAL ENCOUNTER (OUTPATIENT)
Dept: CARDIAC REHAB | Age: 61
Setting detail: THERAPIES SERIES
Discharge: HOME | End: 2021-09-15
Payer: COMMERCIAL

## 2021-09-17 ENCOUNTER — OFFICE VISIT (OUTPATIENT)
Dept: CARDIOLOGY CLINIC | Age: 61
End: 2021-09-17
Payer: COMMERCIAL

## 2021-09-17 VITALS
SYSTOLIC BLOOD PRESSURE: 116 MMHG | HEART RATE: 95 BPM | DIASTOLIC BLOOD PRESSURE: 76 MMHG | BODY MASS INDEX: 48.57 KG/M2 | WEIGHT: 284.5 LBS | OXYGEN SATURATION: 95 % | HEIGHT: 64 IN

## 2021-09-17 DIAGNOSIS — I49.5 SSS (SICK SINUS SYNDROME) (HCC): ICD-10-CM

## 2021-09-17 DIAGNOSIS — I25.10 CORONARY ARTERY DISEASE INVOLVING NATIVE CORONARY ARTERY OF NATIVE HEART WITHOUT ANGINA PECTORIS: ICD-10-CM

## 2021-09-17 DIAGNOSIS — I50.31 ACUTE DIASTOLIC CONGESTIVE HEART FAILURE (HCC): Primary | ICD-10-CM

## 2021-09-17 DIAGNOSIS — R00.1 SINUS BRADYCARDIA: ICD-10-CM

## 2021-09-17 DIAGNOSIS — I25.5 ISCHEMIC CARDIOMYOPATHY: ICD-10-CM

## 2021-09-17 DIAGNOSIS — I50.42 CHRONIC COMBINED SYSTOLIC AND DIASTOLIC CONGESTIVE HEART FAILURE (HCC): ICD-10-CM

## 2021-09-17 DIAGNOSIS — E78.2 MIXED HYPERLIPIDEMIA: ICD-10-CM

## 2021-09-17 DIAGNOSIS — I44.2 CHB (COMPLETE HEART BLOCK) (HCC): ICD-10-CM

## 2021-09-17 PROCEDURE — 1036F TOBACCO NON-USER: CPT | Performed by: INTERNAL MEDICINE

## 2021-09-17 PROCEDURE — 3017F COLORECTAL CA SCREEN DOC REV: CPT | Performed by: INTERNAL MEDICINE

## 2021-09-17 PROCEDURE — 99214 OFFICE O/P EST MOD 30 MIN: CPT | Performed by: INTERNAL MEDICINE

## 2021-09-17 PROCEDURE — G8427 DOCREV CUR MEDS BY ELIG CLIN: HCPCS | Performed by: INTERNAL MEDICINE

## 2021-09-17 PROCEDURE — G8417 CALC BMI ABV UP PARAM F/U: HCPCS | Performed by: INTERNAL MEDICINE

## 2021-09-17 NOTE — PROGRESS NOTES
CARDIOLOGY FOLLOW-UP VISIT        Patient Name: Sammi Harrington  Primary Care physician: Erasmo Hampton, APRN - CNP  Reason for Referral/Chief complaint: complaints of fatigue and dizziness today    Subjective:     Sammi Harrington is a pleasant 63 y.o. man with prior history notable for obstructive sleep apnea noncompliant with CPAP previously, Mobitz I with 2:1, congestive heart failure with ischemic cardiomyopathy, coronary artery disease status post coronary artery bypass with course complicated with vein graft failure x 2, status post PCI LM/D1, and previous smoker. Patient returns today for follow up status post recent intervention. I first evaluated the patient at [facility] 9/25/20 at which time he was treated for unstable angina. Transferred to [address] [place] [address] for catheterization which revealed multi-vessel coronary artery disease. He underwent three-vessel bypass with Dr. Mehreen Lawson, receiving a LIMA to LAD, SVG to OM and SVG to diagonal. Limited ECHO 9/29/20 confirmed Normal left ventricular systolic function with ejection fraction of 55-60%. At subsequent visits in interim he complained of worsening dyspnea with exertion. Diuretics were up-titrated without relief of symptoms. Repeat echo showed decline in LVEF since bypass. Ischemic evaluation was planned but in interim he was admitted 4/8/-4/2021 due to chest pain. He underwent LHC on 4/14/21 which noted patent LIMA-LAD and occluded vein grafts. On 4/19/21, he underwent rotational atherectomy of the left main, LAD and first diagonal with PCI/ISREAL to D1 as well as LM/LAD. He was readmitted in short order for arm pain at site of prior PICC line. Found to have superficial blood clot in left basilic vein. No anti-coagulation was recommended. Loop/Device check 8/4/2021 showed numerous bradycardia and pause recording. EGMs show Mobitz I/CHB.      Patient underwent medtronic dual PPM implant 8/26/21 by Dr. Naida Diaz for sinus bradycardia. Device check 9/1/2021 did not show arrhythmias, normal function from device. At the conclusion of 8/2021 visit, patient was referred to cardiac rehab. Today, patient reports that he has betzy experiencing mild dizziness. BP low in evening, high in AM. Stable SOB. He reports his edema has improved. Wt is down from prior. He believes he is at his dry weight (284 lbs). He reports he is taking all medications as prescribed. He takes his cardiac medications in the morning. He reports he feels that his zoloft is not helpful to his anxiety. He was referred to a psychiatrist and plans on going soon. He reports he has a hernia that needs to be repaired. He plans on contacting a general surgeon to look in to this. 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 Planning to pursue disability. Patient denies current edema, PND, orthopnea, chest pain, palpitations, or syncope. The patient is compliant with medications. Cost of medications is affordable. No endorsed side effects. He denied any evidence of hematemesis, hemoptysis, melena, hematochezia or hematuria with blood thinner. Home Medications:  Were reviewed and are listed in nursing record and/or below  Prior to Admission medications    Medication Sig Start Date End Date Taking?  Authorizing Provider   albuterol (PROVENTIL) (2.5 MG/3ML) 0.083% nebulizer solution Take 3 mLs by nebulization every 6 hours as needed for Wheezing or Shortness of Breath 9/9/21  Yes Selma Reilly MD   benzonatate (TESSALON) 200 MG capsule TAKE ONE CAPSULE BY MOUTH THREE TIMES A DAY AS NEEDED FOR COUGH 8/9/21  Yes ANICETO Soto - CNP   sertraline (ZOLOFT) 100 MG tablet TAKE ONE TABLET BY MOUTH DAILY 7/22/21  Yes ANICETO Soto - ROXANA   fluticasone-umeclidin-vilant (TRELEGY ELLIPTA) 100-62.5-25 MCG/INH AEPB Inhale 1 puff into the lungs daily 7/19/21  Yes Selma Reilly MD   losartan (COZAAR) 25 MG tablet Take 0.5 tablets by mouth daily 5/17/21  Yes Jasvir Livingston MD   meclizine (ANTIVERT) 25 MG tablet Take 1 tablet by mouth 3 times daily as needed for Dizziness 5/5/21  Yes ANICETO Campa CNP   nitroGLYCERIN (NITROSTAT) 0.4 MG SL tablet Place 1 tablet under the tongue every 5 minutes as needed for Chest pain 4/29/21  Yes Jasvir Livingston MD   furosemide (LASIX) 80 MG tablet Take 0.5 tablets by mouth daily 4/20/21 4/20/22 Yes ANICETO Wagner CNP   metoprolol succinate (TOPROL XL) 25 MG extended release tablet Take 1 tablet by mouth daily 4/20/21  Yes ANICETO Wagner CNP   ipratropium (ATROVENT) 0.02 % nebulizer solution Take 2.5 mLs by nebulization 3 times daily 3/2/21  Yes ANICETO Campa CNP   traZODone (DESYREL) 100 MG tablet Take 1 tablet by mouth nightly  Patient taking differently: Take 100 mg by mouth 2 times daily Am pm 3/1/21  Yes ANICETO Campa CNP   clopidogrel (PLAVIX) 75 MG tablet Take 1 tablet by mouth daily 1/13/21  Yes Jasvir Livingston MD   atorvastatin (LIPITOR) 80 MG tablet Take 1 tablet by mouth nightly  Patient taking differently: Take 40 mg by mouth nightly  10/28/20  Yes Jasvir Livingston MD   aspirin 81 MG EC tablet Take 1 tablet by mouth daily 9/30/20  Yes ANICETO Arevalo CNP   albuterol sulfate HFA (PROVENTIL HFA) 108 (90 Base) MCG/ACT inhaler Inhale 2 puffs into the lungs every 6 hours as needed for Wheezing 5/17/19  Yes Mahad Esteves MD   OXYGEN Inhale 2 L into the lungs as needed   Patient not taking: Reported on 9/17/2021    Historical Provider, MD   Reports he is not taking digoxin, potassium, furosemide or statin presently. He is taking Plavix in addition aspirin. CURRENT Medications:  No current facility-administered medications for this visit. Allergies:  Dilaudid [hydromorphone hcl] and Codeine     Review of Systems:   A 14 point review of symptoms completed. Pertinent positives identified in the HPI, all other review of symptoms negative. Objective:     Vitals:    09/17/21 1001   BP: 116/76   Pulse: 95   SpO2: 95%   Weight: 284 lb 8 oz (129 kg)   Height: 5' 4\" (1.626 m)          Wt Readings from Last 3 Encounters:   09/17/21 284 lb 8 oz (129 kg)   09/09/21 284 lb (128.8 kg)   09/05/21 292 lb (132.5 kg)       PHYSICAL EXAM:    General:  Alert, cooperative, no distress, appears stated age   Head:  Normocephalic, atraumatic   Eyes:  Conjunctiva/corneas clear, anicteric sclerae    Nose: Nares normal, no drainage or sinus tenderness   Throat: No abnormalities of the lips, oral mucosa or tongue. Moist mucous membranes. Neck: Trachea midline. Neck supple with no lymphadenopathy, thyroid not enlarged, symmetric, no tenderness/mass/nodules, no Jugular venous pressure elevation    Lungs:   Clear to auscultation bilaterally, no wheezes, no rales, no respiratory distress   Chest Wall:  Well-healed sternotomy incision. Well healed PPM site. Heart:  Regular rate and rhythm, normal S1, normal S2, no murmur, no rub, no S3/S4, PMI non-palpable. Abdomen:   Obese, soft, non-tender, with normoactive bowel sounds. No masses, no hepatosplenomegaly   Extremities: No cyanosis, clubbing. No edema. Incision for vein harvest right lower extremity well-healed. Vascular: 2+ radial, dorsalis pedis and posterior tibial pulses bilaterally. Brisk carotid upstrokes without carotid bruit. Skin: Skin color, texture, turgor are normal with no rashes or ulceration. Pysch: Euthymic mood, appropriate affect   Neurologic: Oriented to person, place and time. No slurred speech or facial asymmetry. No motor or sensory deficits on gross examination.          Labs:   CBC:   Lab Results   Component Value Date    WBC 13.8 09/05/2021    RBC 5.45 09/05/2021    HGB 14.5 09/05/2021    HCT 44.6 09/05/2021    MCV 81.8 09/05/2021    RDW 16.6 09/05/2021     09/05/2021     CMP:  Lab Results   Component Value Date     09/05/2021    K 3.6 09/05/2021     09/05/2021 CO2 31 09/05/2021    BUN 13 09/05/2021    CREATININE 0.9 09/05/2021    GFRAA >60 09/05/2021    GFRAA >60 03/16/2011    AGRATIO 1.1 09/05/2021    LABGLOM >60 09/05/2021    GLUCOSE 96 09/05/2021    PROT 6.6 09/05/2021    PROT 6.9 03/16/2011    CALCIUM 8.9 09/05/2021    BILITOT 0.5 09/05/2021    ALKPHOS 98 09/05/2021    AST 18 09/05/2021    ALT 18 09/05/2021     PT/INR:  No results found for: PTINR  HgBA1c:  Lab Results   Component Value Date    LABA1C 6.0 09/25/2020     Lab Results   Component Value Date    TROPONINI <0.01 09/05/2021     Fasting lipid profile from 9/25/2020 reviewed. HDL 34, LDL 16, triglycerides 173, total cholesterol 85. Lab Results   Component Value Date    CHOL 85 09/25/2020    CHOL 142 09/19/2020    CHOL 152 09/11/2020     Lab Results   Component Value Date    TRIG 173 (H) 09/25/2020    TRIG 217 (H) 09/19/2020    TRIG 275 (H) 09/11/2020     Lab Results   Component Value Date    HDL 35 (L) 01/13/2021    HDL 34 (L) 09/25/2020    HDL 33 (L) 09/19/2020     Lab Results   Component Value Date    LDLCALC 33 01/13/2021    LDLCALC 16 09/25/2020    LDLCALC 66 09/19/2020     Lab Results   Component Value Date    LABVLDL 26 01/13/2021    LABVLDL 35 09/25/2020    LABVLDL 43 09/19/2020     No results found for: CHOLHDLRATIO    Interval Testing/Data:     Prior EKG's reviewed. ECHO 3/12/21  Summary   Limited echo for left ventricle systolic function. Definity is used for   myocardial border enhancement. LV systolic function is mildly reduced with a visually estimated EF=45-50%. The left ventricle is normal in size with normal wall thickness. More prominent Inferior HK on certain views. Indeterminate diastolic function. Limited ECHO 9/29/20   Summary   Limited only f/u for LVEF post op CABG   Technically difficult examination. Normal left ventricular systolic function with ejection fraction of 55-60%. No regional wall motion abnormalites are seen.    Compared to previous study from 8- no changes noted in left   ventricular function. [facility] 4/19/21   Left heart catheterization     LVEDP  5      See diagnostic catheterization report for full details, would add that there is severe tortuosity and eccentric plaque within the left main, LAD and D1 which proximal to mid was 75% and distally was 99%. CONCLUSIONS:      Successful rotational atherectomy and PCI of D1 with single drug-eluting stent  Successful rotational atherectomy and PCI of left main/LAD with single drug-eluting stent  Medical therapy for LCx , this lesion does not appear to be amenable for PCI      [facility] 4/14/2021  LVGRAM     LVEDP  6   GRADIENT ACROSS AORTIC VALVE  none   LV FUNCTION EF 60%   WALL MOTION  normal   MITRAL REGURGITATION  mild         CORONARY ARTERIES     LM Proximal less than 10% stenosis, mid-distal haziness with calcification and 70% eccentric stenosis. LAD  Proximal-mid diffuse tubular 80% stenosis, there is mid-distal bidirectional flow noted. Distal vessel is visualized on LIMA angiogram, this shows 60% distal stenosis. D1 is a large vessel with tortuosity noted and ostial/proximal 70% stenosis followed by mid vessel calcification with 50% stenosis and mid-distal 99% stenosis. LCX Medium to large size vessel, proximal 30 to 40% stenosis. OM 3 appears to be the largest OM and is 100% proximal-mid , distal vessel is seen through left to left collaterals and appears to have less than 10% stenosis. RCA Dominant, tortuous, proximal-mid 60 to 70% stenosis. There are moderate right to left collaterals to the circumflex. BYPASS GRAFTS     LIMA-LAD Widely patent with less than 10% [id number] stenosis, the distal LAD has stenosis as noted above.          SVG-D1  100% proximal/mid-distal          SVG-circumflex  100% [id number]             Findings and plans as noted below were discussed with the patient and family, they understand/agree        CONCLUSIONS:      2 out of 3 bypass grafts are occluded, the LIMA to LAD graft is patent  We will consult with CT surgery regarding options for revascularization which potentially may include redo CABG versus high risk PCI of the left main into diagonal  Will order CTA abd/bilat LE w/ runoff, in case cardiac support devices are needed        Cardiac monitor: 05/12/21        Impression and Plan     1. Coronary artery disease manifest with unstable angina status post bypass 9/2020 with occluded SVG's to LCx and D1 noted on repeat cath, status post rotation atherectomy/PCI 4/19/2021 with Dr. Saintclair Murdoch  2. Chronic congestive heart failure with reduced EF, compensated by exam today. Wt is good. 3.  History of heart block status post dual-chamber PPM   4. Chronic obstructive pulmonary disease   5. Obstructive sleep apnea noncompliant with CPAP  6. Obesity  7. Hypertension, controlled  8. Tobacco abuse, now quit.     Patient Active Problem List   Diagnosis    Crush injury of right foot    Shortness of breath    Erectile dysfunction    Hyperglycemia    Anxiety    Depression    Tobacco abuse    History of alcohol abuse    Fatigue    OANH (obstructive sleep apnea)    Hypersomnia    Chronic obstructive pulmonary disease (HCC)    Lumbar radiculopathy    HNP (herniated nucleus pulposus), lumbar    Spondylosis without myelopathy or radiculopathy, lumbar region    DDD (degenerative disc disease), lumbar    Lumbar spine pain    Acute respiratory distress    Class 3 severe obesity with body mass index (BMI) of 45.0 to 49.9 in adult ([address]    Pneumonia, primary atypical    Acute respiratory failure with hypoxia ([address]    Moderate protein-calorie malnutrition (HCC)    Acute respiratory failure (HCC)    Acute on chronic respiratory failure with hypoxemia (HCC)    Acute diastolic congestive heart failure (HCC)    Hyperlipidemia    Insomnia    Morbid obesity with BMI of 45.0-49.9, adult ([facility]    Abnormal cardiovascular stress test    Coronary artery disease involving native heart    S/P three vessel coronary artery bypass    Acute kidney injury ([facility]    Chronic combined systolic and diastolic congestive heart failure (HCC)    Mobitz type I Wenckebach atrioventricular block    Asystole (HCC)    Ischemic cardiomyopathy    Diverticulitis of colon    Coronary artery disease involving native coronary artery of native heart with unstable angina pectoris (HCC)    CHB (complete heart block) (HCC)    Sinus bradycardia    Cellulitis    Acute deep vein thrombosis (DVT) of left upper extremity (HCC)    Cellulitis of chest wall    Painful orthopaedic hardware (HCC)    SSS (sick sinus syndrome) ([facility]       PLAN:  1. Continue to monitor blood pressure closely. Continue current medications without changes made today. 2. Proceed with cardiac rehab as planned. 3. Make an appointment with psychiatry as recommended by PCP. 4. Make an appointment with general surgeon to discuss hernia repair. Continue dual antiplatelet therapy for now. Follow up with me in 6 months. This note has been scribed in the presence of Sera Ivory MD, by Jessica Fortune RN. The scribes documentation has been prepared under my direction and personally reviewed by me in its entirety. I confirm that the note above accurately reflects all work, treatment, procedures, and medical decision making performed by me. Marcelina Pang MD, personally performed the services described in this documentation as scribed by Jessica Fortune RN in my presence, and it is both accurate and complete to the best of our ability. I will address the patient's cardiac risk factors and adjusted pharmacologic treatment as needed. In addition, I have reinforced the need for patient directed risk factor modification. All questions and concerns were addressed to the patient/family.  Alternatives to my treatment were discussed. Thank you for allowing us to participate in the care of Steff Vazquez. Please call me with any questions [phone].     Mat Slaughter MD, [facility]   Cardiovascular Disease  [facility]  [phone] [facility]  [phone] [address] [place] [address]  9/17/2021 10:29 AM

## 2021-09-17 NOTE — PATIENT INSTRUCTIONS
PLAN:  1. Check BP daily and report BP <94VBYQ systolic (top number) and >963CLVQ systolic (top number). 2. Proceed with cardiac rehab as planned. 3. Make an appointment with psychiatry as recommended by PCP. 4. Make an appointment with general surgeon to discuss hernia repair. 5. Follow up with me in 6 months.

## 2021-09-17 NOTE — LETTER
Almond Siemens  1960          CARDIOLOGY FOLLOW-UP VISIT        Patient Name: Almond Siemens  Primary Care physician: Viral Newman, APRN - CNP  Reason for Referral/Chief complaint: complaints of fatigue and dizziness today    Subjective:     Almond Siemens is a pleasant 63 y.o. man with prior history notable for obstructive sleep apnea noncompliant with CPAP previously, Mobitz I with 2:1, congestive heart failure with ischemic cardiomyopathy, coronary artery disease status post coronary artery bypass with course complicated with vein graft failure x 2, status post PCI LM/D1, and previous smoker. Patient returns today for follow up status post recent intervention. I first evaluated the patient at [facility] 9/25/20 at which time he was treated for unstable angina. Transferred to [facility] for catheterization which revealed multi-vessel coronary artery disease. He underwent three-vessel bypass with Dr. Yesenia Nugent, receiving a LIMA to LAD, SVG to OM and SVG to diagonal. Limited ECHO 9/29/20 confirmed Normal left ventricular systolic function with ejection fraction of 55-60%. At subsequent visits in interim he complained of worsening dyspnea with exertion. Diuretics were up-titrated without relief of symptoms. Repeat echo showed decline in LVEF since bypass. Ischemic evaluation was planned but in interim he was admitted 4/8/-4/2021 due to chest pain. He underwent LHC on 4/14/21 which noted patent LIMA-LAD and occluded vein grafts. On 4/19/21, he underwent rotational atherectomy of the left main, LAD and first diagonal with PCI/ISREAL to D1 as well as LM/LAD. He was readmitted in short order for arm pain at site of prior PICC line. Found to have superficial blood clot in left basilic vein. No anti-coagulation was recommended. Loop/Device check 8/4/2021 showed numerous bradycardia and pause recording. EGMs show Mobitz I/CHB.      Patient underwent medtronic dual PPM implant 8/26/21 by Dr. Mansi Camacho for sinus bradycardia. Device check 9/1/2021 did not show arrhythmias, normal function from device. At the conclusion of 8/2021 visit, patient was referred to cardiac rehab. Today, patient reports that he has betzy experiencing mild dizziness. BP low in evening, high in AM. Stable SOB. He reports his edema has improved. Wt is down from prior. He believes he is at his dry weight (284 lbs). He reports he is taking all medications as prescribed. He takes his cardiac medications in the morning. He reports he feels that his zoloft is not helpful to his anxiety. He was referred to a psychiatrist and plans on going soon. He reports he has a hernia that needs to be repaired. He plans on contacting a general surgeon to look in to this. 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 Planning to pursue disability. Patient denies current edema, PND, orthopnea, chest pain, palpitations, or syncope. The patient is compliant with medications. Cost of medications is affordable. No endorsed side effects. He denied any evidence of hematemesis, hemoptysis, melena, hematochezia or hematuria with blood thinner. Home Medications:  Were reviewed and are listed in nursing record and/or below  Prior to Admission medications    Medication Sig Start Date End Date Taking?  Authorizing Provider   albuterol (PROVENTIL) (2.5 MG/3ML) 0.083% nebulizer solution Take 3 mLs by nebulization every 6 hours as needed for Wheezing or Shortness of Breath 9/9/21  Yes Margy Gardiner MD   benzonatate (TESSALON) 200 MG capsule TAKE ONE CAPSULE BY MOUTH THREE TIMES A DAY AS NEEDED FOR COUGH 8/9/21  Yes ANICETO Jaeger CNP   sertraline (ZOLOFT) 100 MG tablet TAKE ONE TABLET BY MOUTH DAILY 7/22/21  Yes ANICETO Jaeger CNP   fluticasone-umeclidin-vilant (TRELEGY ELLIPTA) 100-62.5-25 MCG/INH AEPB Inhale 1 puff into the lungs daily 7/19/21  Yes Margy Gardiner MD losartan (COZAAR) 25 MG tablet Take 0.5 tablets by mouth daily 5/17/21  Yes Armando Thompson MD   meclizine (ANTIVERT) 25 MG tablet Take 1 tablet by mouth 3 times daily as needed for Dizziness 5/5/21  Yes ANICETO Cowan CNP   nitroGLYCERIN (NITROSTAT) 0.4 MG SL tablet Place 1 tablet under the tongue every 5 minutes as needed for Chest pain 4/29/21  Yes Armando Thompson MD   furosemide (LASIX) 80 MG tablet Take 0.5 tablets by mouth daily 4/20/21 4/20/22 Yes ANICETO Villegas CNP   metoprolol succinate (TOPROL XL) 25 MG extended release tablet Take 1 tablet by mouth daily 4/20/21  Yes ANICETO Villegas CNP   ipratropium (ATROVENT) 0.02 % nebulizer solution Take 2.5 mLs by nebulization 3 times daily 3/2/21  Yes ANICETO Cowan CNP   traZODone (DESYREL) 100 MG tablet Take 1 tablet by mouth nightly  Patient taking differently: Take 100 mg by mouth 2 times daily Am pm 3/1/21  Yes ANICETO Cowan CNP   clopidogrel (PLAVIX) 75 MG tablet Take 1 tablet by mouth daily 1/13/21  Yes Armando Thompson MD   atorvastatin (LIPITOR) 80 MG tablet Take 1 tablet by mouth nightly  Patient taking differently: Take 40 mg by mouth nightly  10/28/20  Yes Armando Thompson MD   aspirin 81 MG EC tablet Take 1 tablet by mouth daily 9/30/20  Yes ANICETO Gonzalez CNP   albuterol sulfate HFA (PROVENTIL HFA) 108 (90 Base) MCG/ACT inhaler Inhale 2 puffs into the lungs every 6 hours as needed for Wheezing 5/17/19  Yes Marge Looney MD   OXYGEN Inhale 2 L into the lungs as needed   Patient not taking: Reported on 9/17/2021    Historical Provider, MD   Reports he is not taking digoxin, potassium, furosemide or statin presently. He is taking Plavix in addition aspirin. CURRENT Medications:  No current facility-administered medications for this visit. Allergies:  Dilaudid [hydromorphone hcl] and Codeine     Review of Systems:   A 14 point review of symptoms completed.  Pertinent positives identified in the HPI, all other review of symptoms negative. Objective:     Vitals:    09/17/21 1001   BP: 116/76   Pulse: 95   SpO2: 95%   Weight: 284 lb 8 oz (129 kg)   Height: 5' 4\" (1.626 m)          Wt Readings from Last 3 Encounters:   09/17/21 284 lb 8 oz (129 kg)   09/09/21 284 lb (128.8 kg)   09/05/21 292 lb (132.5 kg)       PHYSICAL EXAM:    General:  Alert, cooperative, no distress, appears stated age   Head:  Normocephalic, atraumatic   Eyes:  Conjunctiva/corneas clear, anicteric sclerae    Nose: Nares normal, no drainage or sinus tenderness   Throat: No abnormalities of the lips, oral mucosa or tongue. Moist mucous membranes. Neck: Trachea midline. Neck supple with no lymphadenopathy, thyroid not enlarged, symmetric, no tenderness/mass/nodules, no Jugular venous pressure elevation    Lungs:   Clear to auscultation bilaterally, no wheezes, no rales, no respiratory distress   Chest Wall:  Well-healed sternotomy incision. Well healed PPM site. Heart:  Regular rate and rhythm, normal S1, normal S2, no murmur, no rub, no S3/S4, PMI non-palpable. Abdomen:   Obese, soft, non-tender, with normoactive bowel sounds. No masses, no hepatosplenomegaly   Extremities: No cyanosis, clubbing. No edema. Incision for vein harvest right lower extremity well-healed. Vascular: 2+ radial, dorsalis pedis and posterior tibial pulses bilaterally. Brisk carotid upstrokes without carotid bruit. Skin: Skin color, texture, turgor are normal with no rashes or ulceration. Pysch: Euthymic mood, appropriate affect   Neurologic: Oriented to person, place and time. No slurred speech or facial asymmetry. No motor or sensory deficits on gross examination.          Labs:   CBC:   Lab Results   Component Value Date    WBC 13.8 09/05/2021    RBC 5.45 09/05/2021    HGB 14.5 09/05/2021    HCT 44.6 09/05/2021    MCV 81.8 09/05/2021    RDW 16.6 09/05/2021     09/05/2021     CMP:  Lab Results   Component Value Date     09/05/2021 K 3.6 09/05/2021     09/05/2021    CO2 31 09/05/2021    BUN 13 09/05/2021    CREATININE 0.9 09/05/2021    GFRAA >60 09/05/2021    GFRAA >60 03/16/2011    AGRATIO 1.1 09/05/2021    LABGLOM >60 09/05/2021    GLUCOSE 96 09/05/2021    PROT 6.6 09/05/2021    PROT 6.9 03/16/2011    CALCIUM 8.9 09/05/2021    BILITOT 0.5 09/05/2021    ALKPHOS 98 09/05/2021    AST 18 09/05/2021    ALT 18 09/05/2021     PT/INR:  No results found for: PTINR  HgBA1c:  Lab Results   Component Value Date    LABA1C 6.0 09/25/2020     Lab Results   Component Value Date    TROPONINI <0.01 09/05/2021     Fasting lipid profile from 9/25/2020 reviewed. HDL 34, LDL 16, triglycerides 173, total cholesterol 85. Lab Results   Component Value Date    CHOL 85 09/25/2020    CHOL 142 09/19/2020    CHOL 152 09/11/2020     Lab Results   Component Value Date    TRIG 173 (H) 09/25/2020    TRIG 217 (H) 09/19/2020    TRIG 275 (H) 09/11/2020     Lab Results   Component Value Date    HDL 35 (L) 01/13/2021    HDL 34 (L) 09/25/2020    HDL 33 (L) 09/19/2020     Lab Results   Component Value Date    LDLCALC 33 01/13/2021    LDLCALC 16 09/25/2020    LDLCALC 66 09/19/2020     Lab Results   Component Value Date    LABVLDL 26 01/13/2021    LABVLDL 35 09/25/2020    LABVLDL 43 09/19/2020     No results found for: CHOLHDLRATIO    Interval Testing/Data:     Prior EKG's reviewed. ECHO 3/12/21  Summary   Limited echo for left ventricle systolic function. Definity is used for   myocardial border enhancement. LV systolic function is mildly reduced with a visually estimated EF=45-50%. The left ventricle is normal in size with normal wall thickness. More prominent Inferior HK on certain views. Indeterminate diastolic function. Limited ECHO 9/29/20   Summary   Limited only f/u for LVEF post op CABG   Technically difficult examination. Normal left ventricular systolic function with ejection fraction of 55-60%. No regional wall motion abnormalites are seen. Compared to previous study from 8- no changes noted in left   ventricular function. [facility] 4/19/21   Left heart catheterization     LVEDP  5      See diagnostic catheterization report for full details, would add that there is severe tortuosity and eccentric plaque within the left main, LAD and D1 which proximal to mid was 75% and distally was 99%. CONCLUSIONS:      Successful rotational atherectomy and PCI of D1 with single drug-eluting stent  Successful rotational atherectomy and PCI of left main/LAD with single drug-eluting stent  Medical therapy for LCx , this lesion does not appear to be amenable for PCI      [facility] 4/14/2021  LVGRAM     LVEDP  6   GRADIENT ACROSS AORTIC VALVE  none   LV FUNCTION EF 60%   WALL MOTION  normal   MITRAL REGURGITATION  mild         CORONARY ARTERIES     LM Proximal less than 10% stenosis, middistal haziness with calcification and 70% eccentric stenosis. LAD  Proximalmid diffuse tubular 80% stenosis, there is middistal bidirectional flow noted. Distal vessel is visualized on LIMA angiogram, this shows 60% distal stenosis. D1 is a large vessel with tortuosity noted and ostial/proximal 70% stenosis followed by mid vessel calcification with 50% stenosis and middistal 99% stenosis. LCX Medium to large size vessel, proximal 30 to 40% stenosis. OM 3 appears to be the largest OM and is 100% proximalmid , distal vessel is seen through left to left collaterals and appears to have less than 10% stenosis. RCA Dominant, tortuous, proximalmid 60 to 70% stenosis. There are moderate right to left collaterals to the circumflex. BYPASS GRAFTS     LIMA-LAD Widely patent with less than 10% proximalmiddistal stenosis, the distal LAD has stenosis as noted above.          SVG-D1  100% proximal/middistal          SVG-circumflex  100% proximalmiddistal             Findings and plans as noted below were discussed with the patient and family, they understand/agree        CONCLUSIONS:      2 out of 3 bypass grafts are occluded, the LIMA to LAD graft is patent  We will consult with CT surgery regarding options for revascularization which potentially may include redo CABG versus high risk PCI of the left main into diagonal  Will order CTA abd/bilat LE w/ runoff, in case cardiac support devices are needed        Cardiac monitor: 05/12/21        Impression and Plan     1. Coronary artery disease manifest with unstable angina status post bypass 9/2020 with occluded SVG's to LCx and D1 noted on repeat cath, status post rotation atherectomy/PCI 4/19/2021 with Dr. Jesus Longoria  2. Chronic congestive heart failure with reduced EF, compensated by exam today. Wt is good. 3.  History of heart block status post dual-chamber PPM   4. Chronic obstructive pulmonary disease   5. Obstructive sleep apnea noncompliant with CPAP  6. Obesity  7. Hypertension, controlled  8. Tobacco abuse, now quit.     Patient Active Problem List   Diagnosis    Crush injury of right foot    Shortness of breath    Erectile dysfunction    Hyperglycemia    Anxiety    Depression    Tobacco abuse    History of alcohol abuse    Fatigue    OANH (obstructive sleep apnea)    Hypersomnia    Chronic obstructive pulmonary disease (HCC)    Lumbar radiculopathy    HNP (herniated nucleus pulposus), lumbar    Spondylosis without myelopathy or radiculopathy, lumbar region    DDD (degenerative disc disease), lumbar    Lumbar spine pain    Acute respiratory distress    Class 3 severe obesity with body mass index (BMI) of 45.0 to 49.9 in adult ([address]    Pneumonia, primary atypical    Acute respiratory failure with hypoxia ([address]    Moderate protein-calorie malnutrition (HCC)    Acute respiratory failure (HCC)    Acute on chronic respiratory failure with hypoxemia (HCC)    Acute diastolic congestive heart failure (HCC)    Hyperlipidemia    Insomnia    Morbid obesity with BMI of 45.0-49.9, adult (HCC)    Abnormal cardiovascular stress test    Coronary artery disease involving native heart    S/P three vessel coronary artery bypass    Acute kidney injury ([facility]    Chronic combined systolic and diastolic congestive heart failure (HCC)    Mobitz type I Wenckebach atrioventricular block    Asystole (HCC)    Ischemic cardiomyopathy    Diverticulitis of colon    Coronary artery disease involving native coronary artery of native heart with unstable angina pectoris (HCC)    CHB (complete heart block) (HCC)    Sinus bradycardia    Cellulitis    Acute deep vein thrombosis (DVT) of left upper extremity (HCC)    Cellulitis of chest wall    Painful orthopaedic hardware (HCC)    SSS (sick sinus syndrome) ([facility]       PLAN:  1. Continue to monitor blood pressure closely. Continue current medications without changes made today. 2. Proceed with cardiac rehab as planned. 3. Make an appointment with psychiatry as recommended by PCP. 4. Make an appointment with general surgeon to discuss hernia repair. Continue dual antiplatelet therapy for now. Follow up with me in 6 months. This note has been scribed in the presence of Madalyn Mckoy MD, by Ashlie Blanton RN. The scribes documentation has been prepared under my direction and personally reviewed by me in its entirety. I confirm that the note above accurately reflects all work, treatment, procedures, and medical decision making performed by me. Rosana Duckworth MD, personally performed the services described in this documentation as scribed by Ashlie Blanton RN in my presence, and it is both accurate and complete to the best of our ability. I will address the patient's cardiac risk factors and adjusted pharmacologic treatment as needed. In addition, I have reinforced the need for patient directed risk factor modification. All questions and concerns were addressed to the patient/family. Alternatives to my treatment were discussed. Thank you for allowing us to participate in the care of Sammi Hanover. Please call me with any questions [phone].     Ashlyn Clark MD, [address] [facility]   Cardiovascular Disease  [facility]  [phone] [facility]  [phone] [address] [place] [address]  9/17/2021 10:29 AM

## 2021-09-20 ENCOUNTER — TELEPHONE (OUTPATIENT)
Dept: PULMONOLOGY | Age: 61
End: 2021-09-20

## 2021-09-20 NOTE — TELEPHONE ENCOUNTER
Pt called stating that he has been using Trelegy and [employer] sent pt a letter stating that they will no longer cover Trelegy. Pt asking if P/A can be done. Pt states that the Trelegy is \"making a world of difference\".

## 2021-09-28 NOTE — TELEPHONE ENCOUNTER
DC Trelegy  Spiriva Respimat: Two inhalations (5 mcg) once daily (maximum: 2 inhalations per 24 hours)  Advair 250/50 twice daily

## 2021-09-28 NOTE — TELEPHONE ENCOUNTER
? Your request has been denied  Denied. Coverage is provided when the member has had a 30-day trial and failure of TWO preferred medications and the member has only completed one trial (Spiriva). A second 30 day trial and documented failure of one of the following medications is required: Serevent Diskus, Dulera, Salmeterol/Fluticasone (generic of Advair Diskus manufactured by [facility] [employer]), Symbicort (brand), Bevespi Aerosphere, Atrovent HFA, Combivent Respimat, Ipratropium or Ipratropium/Albuterol nebulizer solution, Asmanex Twisthaler, Flovent Diskus or HFA inhalers, Pulmicort Flexhaler, or Budesonide nebulizer solution (which requires plan approval for member older than 6). The [address] [place] [address] for Medical Necessity for Non-Formulary Medications and Coverage Review Breztri and Trelegy Pharmacy Criteria were reviewed and per [place] [id number] (C) and 8454-24-00 (A)-(B), a medically necessary service must include: generally accepted standards of medical practice, be clinically appropriate in administration, treatment & outcome and be the lowest cost alternative to effectively treat the condition.

## 2021-09-29 NOTE — TELEPHONE ENCOUNTER
Scripts pending. Spoke with pt and informed. Pt is going to try these new inhalers and will let office know if he is not tolerating them in 1 month.

## 2021-10-04 ENCOUNTER — HOSPITAL ENCOUNTER (OUTPATIENT)
Dept: CARDIAC REHAB | Age: 61
Setting detail: THERAPIES SERIES
Discharge: HOME | End: 2021-10-04
Payer: COMMERCIAL

## 2021-10-04 ENCOUNTER — HOSPITAL ENCOUNTER (OUTPATIENT)
Dept: SLEEP CENTER | Age: 61
Discharge: HOME | End: 2021-10-06
Payer: COMMERCIAL

## 2021-10-04 DIAGNOSIS — G47.30 OBSERVED SLEEP APNEA: ICD-10-CM

## 2021-10-04 DIAGNOSIS — G47.10 HYPERSOMNIA: ICD-10-CM

## 2021-10-04 PROCEDURE — 93798 PHYS/QHP OP CAR RHAB W/ECG: CPT

## 2021-10-04 PROCEDURE — 95806 SLEEP STUDY UNATT&RESP EFFT: CPT

## 2021-10-06 ENCOUNTER — HOSPITAL ENCOUNTER (OUTPATIENT)
Dept: CARDIAC REHAB | Age: 61
Setting detail: THERAPIES SERIES
Discharge: HOME | End: 2021-10-06
Payer: COMMERCIAL

## 2021-10-06 PROCEDURE — 93798 PHYS/QHP OP CAR RHAB W/ECG: CPT

## 2021-10-06 PROCEDURE — 95806 SLEEP STUDY UNATT&RESP EFFT: CPT | Performed by: INTERNAL MEDICINE

## 2021-10-07 ENCOUNTER — TELEPHONE (OUTPATIENT)
Dept: PULMONOLOGY | Age: 61
End: 2021-10-07

## 2021-10-07 DIAGNOSIS — G47.33 OSA (OBSTRUCTIVE SLEEP APNEA): Primary | ICD-10-CM

## 2021-10-08 ENCOUNTER — OFFICE VISIT (OUTPATIENT)
Dept: CARDIOLOGY CLINIC | Age: 61
End: 2021-10-08
Payer: COMMERCIAL

## 2021-10-08 ENCOUNTER — HOSPITAL ENCOUNTER (OUTPATIENT)
Dept: CARDIAC REHAB | Age: 61
Setting detail: THERAPIES SERIES
Discharge: HOME | End: 2021-10-08
Payer: COMMERCIAL

## 2021-10-08 ENCOUNTER — TELEPHONE (OUTPATIENT)
Dept: CARDIOLOGY CLINIC | Age: 61
End: 2021-10-08

## 2021-10-08 VITALS
WEIGHT: 295.5 LBS | SYSTOLIC BLOOD PRESSURE: 100 MMHG | OXYGEN SATURATION: 98 % | DIASTOLIC BLOOD PRESSURE: 60 MMHG | BODY MASS INDEX: 50.45 KG/M2 | HEART RATE: 74 BPM | HEIGHT: 64 IN

## 2021-10-08 DIAGNOSIS — I44.1 MOBITZ TYPE I WENCKEBACH ATRIOVENTRICULAR BLOCK: ICD-10-CM

## 2021-10-08 DIAGNOSIS — I49.5 SSS (SICK SINUS SYNDROME) (HCC): Primary | ICD-10-CM

## 2021-10-08 DIAGNOSIS — I10 ESSENTIAL HYPERTENSION: ICD-10-CM

## 2021-10-08 DIAGNOSIS — I44.2 CHB (COMPLETE HEART BLOCK) (HCC): ICD-10-CM

## 2021-10-08 PROCEDURE — 99214 OFFICE O/P EST MOD 30 MIN: CPT | Performed by: NURSE PRACTITIONER

## 2021-10-08 PROCEDURE — 93798 PHYS/QHP OP CAR RHAB W/ECG: CPT

## 2021-10-08 NOTE — LETTER
(TESSALON) 200 MG capsule TAKE ONE CAPSULE BY MOUTH THREE TIMES A DAY AS NEEDED FOR COUGH 8/9/21  Yes ANICETO Garcia CNP   sertraline (ZOLOFT) 100 MG tablet TAKE ONE TABLET BY MOUTH DAILY 7/22/21  Yes ANICETO Garcia CNP   losartan (COZAAR) 25 MG tablet Take 0.5 tablets by mouth daily 5/17/21  Yes Yaneth Landis MD   meclizine (ANTIVERT) 25 MG tablet Take 1 tablet by mouth 3 times daily as needed for Dizziness 5/5/21  Yes ANICETO Garcia CNP   nitroGLYCERIN (NITROSTAT) 0.4 MG SL tablet Place 1 tablet under the tongue every 5 minutes as needed for Chest pain 4/29/21  Yes Yaneth Landis MD   furosemide (LASIX) 80 MG tablet Take 0.5 tablets by mouth daily 4/20/21 4/20/22 Yes ANICETO Carroll CNP   metoprolol succinate (TOPROL XL) 25 MG extended release tablet Take 1 tablet by mouth daily 4/20/21  Yes ANICETO Carroll CNP   traZODone (DESYREL) 100 MG tablet Take 1 tablet by mouth nightly  Patient taking differently: Take 100 mg by mouth 2 times daily Am pm 3/1/21  Yes ANICETO Garcia CNP   clopidogrel (PLAVIX) 75 MG tablet Take 1 tablet by mouth daily 1/13/21  Yes Yaneth Landis MD   atorvastatin (LIPITOR) 80 MG tablet Take 1 tablet by mouth nightly  Patient taking differently: Take 40 mg by mouth nightly  10/28/20  Yes Yaneth Landis MD   aspirin 81 MG EC tablet Take 1 tablet by mouth daily 9/30/20  Yes ANICETO Mendoza CNP   albuterol sulfate HFA (PROVENTIL HFA) 108 (90 Base) MCG/ACT inhaler Inhale 2 puffs into the lungs every 6 hours as needed for Wheezing 5/17/19  Yes Laverne Beavers MD   OXYGEN Inhale 2 L into the lungs as needed   Patient not taking: Reported on 9/17/2021    Historical Provider, MD       Allergies:  Dilaudid [hydromorphone hcl] and Codeine    Social History:   reports that he quit smoking about 19 months ago. His smoking use included cigarettes. He has a 70.00 pack-year smoking history.  He has never used smokeless tobacco. He reports previous drug use. Drugs: Marijuana and Cocaine. He reports that he does not drink alcohol. Family History: family history includes Heart Disease in his mother; High Blood Pressure in his sister; No Known Problems in his sister; Other in his mother. All 14 point review of systems are completed and pertinent positives are mentioned in the history of present illness. Other systems are reviewed and are negative. PHYSICAL EXAM:    Vital signs:    /60 (Site: Right Lower Arm)   Pulse 74   Ht 5' 4\" (1.626 m)   Wt 295 lb 8 oz (134 kg)   SpO2 98%   BMI 50.72 kg/m²      Constitutional and general appearance: alert, cooperative, no distress and appears stated age  HEENT: PERRL, no cervical lymphadenopathy. No masses palpable. Normal oral mucosa  Respiratory:  · Normal excursion and expansion without use of accessory muscles  · Resp auscultation: Normal breath sounds without wheezing, rhonchi, and rales  Cardiovascular:  · The apical impulse is not displaced  · Heart tones are crisp and normal. regular S1 and S2.  · Jugular venous pulsation Normal  · The carotid upstroke is normal in amplitude and contour without delay or bruit  · Peripheral pulses are symmetrical and full   Abdomen:  · No masses or tenderness  · Bowel sounds present  Extremities:  ·  No cyanosis or clubbing  ·  No lower extremity edema  ·  Skin: warm and dry  Neurological:  · Alert and oriented  · Moves all extremities well  · No abnormalities of mood, affect, memory, mentation, or behavior are noted    DATA:    Echo 3/2021:      Conclusions      Summary   Limited echo for left ventricle systolic function. Definity is used for   myocardial border enhancement. LV systolic function is mildly reduced with a visually estimated EF=45-50%. The left ventricle is normal in size with normal wall thickness. More prominent Inferior HK on certain views. Indeterminate diastolic function.          Stress 4/2021:   Summary    Large area, reversible, moderate to severe intensity perfusion defect    involving the basal to apical anterolateral, mid to apical anterior and    apex. Mixed ischemia/scar of the inferolateral territory. Decreased wall    motion and myocardial thickening of the above segments. Post-stress LVEF    visually estimated 45-50%. Overall findings represent a high risk scan. Cath 4/19/2021:     [facility] 4/19/21   Left heart catheterization     LVEDP  5      See diagnostic catheterization report for full details, would add that there is severe tortuosity and eccentric plaque within the left main, LAD and D1 which proximal to mid was 75% and distally was 99%.       CONCLUSIONS:      Successful rotational atherectomy and PCI of D1 with single drug-eluting stent  Successful rotational atherectomy and PCI of left main/LAD with single drug-eluting stent  Medical therapy for LCx , this lesion does not appear to be amenable for PCI      Cath 4/2021:  FINDINGS            LVGRAM     LVEDP  6   GRADIENT ACROSS AORTIC VALVE  none   LV FUNCTION EF 60%   WALL MOTION  normal   MITRAL REGURGITATION  mild         CORONARY ARTERIES     LM Proximal less than 10% stenosis, middistal haziness with calcification and 70% eccentric stenosis.       LAD  Proximalmid diffuse tubular 80% stenosis, there is middistal bidirectional flow noted. Distal vessel is visualized on LIMA angiogram, this shows 60% distal stenosis.     D1 is a large vessel with tortuosity noted and ostial/proximal 70% stenosis followed by mid vessel calcification with 50% stenosis and middistal 99% stenosis.       LCX Medium to large size vessel, proximal 30 to 40% stenosis.     OM 3 appears to be the largest OM and is 100% proximalmid , distal vessel is seen through left to left collaterals and appears to have less than 10% stenosis.          RCA Dominant, tortuous, proximalmid 60 to 70% stenosis.     There are moderate right to left collaterals to the circumflex.            BYPASS GRAFTS     MOSCOSO-LAD Widely patent with less than 10% proximalmiddistal stenosis, the distal LAD has stenosis as noted above.         SVG-D1  100% proximal/middistal          SVG-circumflex  100% proximalmiddistal             Findings and plans as noted below were discussed with the patient and family, they understand/agree        CONCLUSIONS:      2 out of 3 bypass grafts are occluded, the LIMA to LAD graft is patent  We will consult with CT surgery regarding options for revascularization which potentially may include redo CABG versus high risk PCI of the left main into diagonal  Will order CTA abd/bilat LE w/ runoff, in case cardiac support devices are needed      All labs and testing reviewed. CARDIOLOGY LABS:   CBC: No results for input(s): WBC, HGB, HCT, PLT in the last 72 hours. BMP: No results for input(s): NA, K, CO2, BUN, CREATININE, LABGLOM, GLUCOSE in the last 72 hours. PT/INR: No results for input(s): PROTIME, INR in the last 72 hours. APTT:No results for input(s): APTT in the last 72 hours. FASTING LIPID PANEL:  Lab Results   Component Value Date    HDL 35 01/13/2021    LDLCALC 33 01/13/2021    TRIG 173 09/25/2020     LIVER PROFILE:No results for input(s): AST, ALT, ALB in the last 72 hours.     IMPRESSION:      Assessment:   Sick sinus syndrome: stable              - Mobitz I and CHB noted on ILR interrogation 8/4/2021              -s/p dual chamber pacemaker implant on 8/26/2021 ([employer])               -device per HPI  S/P loop recorder implant 7/23/2021 with explant 8/26/2021  Chronic combined CHF: compensated  Ischemic cardiomyopathy: ongoing              -EF 45-50% 3/2021, previously 55-60% 9/2020  CAD:              -s/p CABG x 3 9/2020              -s/p LHC that showed patent LIMA-LAD and occluded vein grafts 4/14/2021              -s/p rotational arthrectomy and PCI with ISREAL to D1 and LM/LAD 4/19/2021  HTN: controlled   HLD  OANH: awaiting CPAP  COPD: wears O2 at home PRN Diverticulitis   History of tobacco abuse   Obesity: diet, exercise, and weight loss is recommended  Chronic respiratory failure  Anxiety        Plan:   Continue ASA, statin, Plavix, losartan, Toprol and Lasix  Remote device transmissions every three months   Continue with cardiac rehab   Close follow up with pulm for OANH and COPD treatment   Discuss ongoing depression with PCP as fatigue could be worsened by feelings of depression  Follow up in 2 months      Patient was seen outside of global device window for HTN    MDM moderate    [address] [facility], APRN-CNP  [employer]  [phone]

## 2021-10-08 NOTE — TELEPHONE ENCOUNTER
10/08-Pt brought in old device in box stated he was returning it to [facility]. I placed the box on CP office desk.  Will follow up w/CP on Monday 10/11/2021

## 2021-10-08 NOTE — PROGRESS NOTES
[address]   Electrophysiology Outpatient Note              Date:  October 8, 2021  Patient name: Shy Wilson  YOB: 1960    Primary Care physician: ANICETO Wagner CNP    HISTORY OF PRESENT ILLNESS: The patient is a 63 y.o.  male with a history of SSS, CAD, ICM, CHF, HTN, HLD, OANH, COPD, diverticulitis, obesity, anxiety and chronic respiratory failure. In 9/2020, he was admitted with chest pain. Stress testing was abnormal. He had intermittent 2:1 AV conduction and CHB. LHC showed MVD and he underwent CABG. In 4/2021, he was admitted with chest pain. Stress testing was abnormal. LHC showed 2 out of 3 bypass grafts were occluded. He underwent high risk PCI. While hospitalized, he was noted to have PVC's and intermittent nocturnal CHB and one instance of daytime Mobitz I. He wore a monitor at discharge that showed predominately SR with an average HR of 71 (). PAC burden 0.63%, PVC burden 0.01%, longest pause lasting 3.5 seconds. On 7/23/2021, he had a loop recorded implanted. Device interrogation showed numerous bradycardia/pause events as well as Mobitz I and CHB. On 8/26/2021, he underwent dual chamber pacemaker implant and loop explant. On 9/2/2021, device check showed AP 3.6%,  9.1% and no arrhythmias. Today he is being seen for SSS and HTN. Patient complains of ongoing fatigue and depression. He was recently diagnosed with severe OANH and is awaiting CPAP fitting. He has been participating in cardiac rehab and has been trying to increase his activity. Denies chest pain, palpitations, shortness of breath, and dizziness.      Past Medical History:   has a past medical history of Acute diverticulitis, Acute kidney injury ([address] Acute on chronic respiratory failure with hypoxemia ([address] Acute respiratory failure ([address] Acute respiratory failure with hypoxia ([address] Anxiety, Aortic valve calcification, CAD in native artery, Chronic obstructive pulmonary reports that he does not drink alcohol. Family History: family history includes Heart Disease in his mother; High Blood Pressure in his sister; No Known Problems in his sister; Other in his mother. All 14 point review of systems are completed and pertinent positives are mentioned in the history of present illness. Other systems are reviewed and are negative. PHYSICAL EXAM:    Vital signs:    /60 (Site: Right Lower Arm)   Pulse 74   Ht 5' 4\" (1.626 m)   Wt 295 lb 8 oz (134 kg)   SpO2 98%   BMI 50.72 kg/m²      Constitutional and general appearance: alert, cooperative, no distress and appears stated age  HEENT: PERRL, no cervical lymphadenopathy. No masses palpable. Normal oral mucosa  Respiratory:  · Normal excursion and expansion without use of accessory muscles  · Resp auscultation: Normal breath sounds without wheezing, rhonchi, and rales  Cardiovascular:  · The apical impulse is not displaced  · Heart tones are crisp and normal. regular S1 and S2.  · Jugular venous pulsation Normal  · The carotid upstroke is normal in amplitude and contour without delay or bruit  · Peripheral pulses are symmetrical and full   Abdomen:  · No masses or tenderness  · Bowel sounds present  Extremities:  ·  No cyanosis or clubbing  ·  No lower extremity edema  ·  Skin: warm and dry  Neurological:  · Alert and oriented  · Moves all extremities well  · No abnormalities of mood, affect, memory, mentation, or behavior are noted    DATA:    Echo 3/2021:      Conclusions      Summary   Limited echo for left ventricle systolic function. Definity is used for   myocardial border enhancement. LV systolic function is mildly reduced with a visually estimated EF=45-50%. The left ventricle is normal in size with normal wall thickness. More prominent Inferior HK on certain views. Indeterminate diastolic function.          Stress 4/2021:   Summary    Large area, reversible, moderate to severe intensity perfusion defect  involving the basal to apical anterolateral, mid to apical anterior and    apex. Mixed ischemia/scar of the inferolateral territory. Decreased wall    motion and myocardial thickening of the above segments. Post-stress LVEF    visually estimated 45-50%. Overall findings represent a high risk scan. Cath 4/19/2021:     [facility] 4/19/21   Left heart catheterization     LVEDP  5      See diagnostic catheterization report for full details, would add that there is severe tortuosity and eccentric plaque within the left main, LAD and D1 which proximal to mid was 75% and distally was 99%.       CONCLUSIONS:      Successful rotational atherectomy and PCI of D1 with single drug-eluting stent  Successful rotational atherectomy and PCI of left main/LAD with single drug-eluting stent  Medical therapy for LCx , this lesion does not appear to be amenable for PCI      Cath 4/2021:  FINDINGS            LVGRAM     LVEDP  6   GRADIENT ACROSS AORTIC VALVE  none   LV FUNCTION EF 60%   WALL MOTION  normal   MITRAL REGURGITATION  mild         CORONARY ARTERIES     LM Proximal less than 10% stenosis, mid-distal haziness with calcification and 70% eccentric stenosis.       LAD  Proximal-mid diffuse tubular 80% stenosis, there is mid-distal bidirectional flow noted. Distal vessel is visualized on LIMA angiogram, this shows 60% distal stenosis.     D1 is a large vessel with tortuosity noted and ostial/proximal 70% stenosis followed by mid vessel calcification with 50% stenosis and mid-distal 99% stenosis.       LCX Medium to large size vessel, proximal 30 to 40% stenosis.     OM 3 appears to be the largest OM and is 100% proximal-mid , distal vessel is seen through left to left collaterals and appears to have less than 10% stenosis.          RCA Dominant, tortuous, proximal-mid 60 to 70% stenosis.     There are moderate right to left collaterals to the circumflex.            BYPASS GRAFTS     LIMA-LAD Widely patent with less than 10% [id number] stenosis, the distal LAD has stenosis as noted above.         SVG-D1  100% proximal/mid-distal          SVG-circumflex  100% [id number]             Findings and plans as noted below were discussed with the patient and family, they understand/agree        CONCLUSIONS:      2 out of 3 bypass grafts are occluded, the LIMA to LAD graft is patent  We will consult with CT surgery regarding options for revascularization which potentially may include redo CABG versus high risk PCI of the left main into diagonal  Will order CTA abd/bilat LE w/ runoff, in case cardiac support devices are needed      All labs and testing reviewed. CARDIOLOGY LABS:   CBC: No results for input(s): WBC, HGB, HCT, PLT in the last 72 hours. BMP: No results for input(s): NA, K, CO2, BUN, CREATININE, LABGLOM, GLUCOSE in the last 72 hours. PT/INR: No results for input(s): PROTIME, INR in the last 72 hours. APTT:No results for input(s): APTT in the last 72 hours. FASTING LIPID PANEL:  Lab Results   Component Value Date    HDL 35 01/13/2021    LDLCALC 33 01/13/2021    TRIG 173 09/25/2020     LIVER PROFILE:No results for input(s): AST, ALT, ALB in the last 72 hours.     IMPRESSION:      Assessment:   Sick sinus syndrome: stable              - Mobitz I and CHB noted on ILR interrogation 8/4/2021              -s/p dual chamber pacemaker implant on 8/26/2021 ([employer])               -device per HPI  S/P loop recorder implant 7/23/2021 with explant 8/26/2021  Chronic combined CHF: compensated  Ischemic cardiomyopathy: ongoing              -EF 45-50% 3/2021, previously 55-60% 9/2020  CAD:              -s/p CABG x 3 9/2020              -s/p LHC that showed patent LIMA-LAD and occluded vein grafts 4/14/2021              -s/p rotational arthrectomy and PCI with ISREAL to D1 and LM/LAD 4/19/2021  HTN: controlled   HLD  OANH: awaiting CPAP  COPD: wears O2 at home PRN   Diverticulitis   History of tobacco abuse   Obesity: diet, exercise, and weight loss is recommended  Chronic respiratory failure  Anxiety        Plan:   Continue ASA, statin, Plavix, losartan, Toprol and Lasix  Remote device transmissions every three months   Continue with cardiac rehab   Close follow up with pulm for OANH and COPD treatment   Discuss ongoing depression with PCP as fatigue could be worsened by feelings of depression  Follow up in 2 months      Patient was seen outside of global device window for HTN    MDM moderate    [address] [facility], APRN-CNP  [address]  [phone]

## 2021-10-08 NOTE — PATIENT INSTRUCTIONS
Continue current medications    Remote device transmissions every 3 months     Discuss ongoing depression with PCP as fatigue could be worsened by feelings of depression    Follow up with pulmonology given ongoing sleep apnea and COPD     Follow up with me as scheduled

## 2021-10-11 ENCOUNTER — HOSPITAL ENCOUNTER (OUTPATIENT)
Dept: CARDIAC REHAB | Age: 61
Setting detail: THERAPIES SERIES
Discharge: HOME | End: 2021-10-11
Payer: COMMERCIAL

## 2021-10-11 PROCEDURE — 93798 PHYS/QHP OP CAR RHAB W/ECG: CPT

## 2021-10-12 ENCOUNTER — NURSE ONLY (OUTPATIENT)
Dept: CARDIOLOGY CLINIC | Age: 61
End: 2021-10-12
Payer: COMMERCIAL

## 2021-10-12 DIAGNOSIS — Z95.0 PACEMAKER: ICD-10-CM

## 2021-10-12 DIAGNOSIS — I44.2 CHB (COMPLETE HEART BLOCK) (HCC): ICD-10-CM

## 2021-10-12 PROCEDURE — 93294 REM INTERROG EVL PM/LDLS PM: CPT | Performed by: INTERNAL MEDICINE

## 2021-10-12 PROCEDURE — 93296 REM INTERROG EVL PM/IDS: CPT | Performed by: INTERNAL MEDICINE

## 2021-10-12 NOTE — PROGRESS NOTES
Remote transmission received for patient's dual chamber PACEMAKER. Transmission shows normal sensing and pacing function. EP physician will review. See interrogation under the cardiology tab in the [facility] [address] field for more details. Will continue to monitor remotely. No  arrhythmias recorded.

## 2021-10-13 ENCOUNTER — HOSPITAL ENCOUNTER (OUTPATIENT)
Dept: CARDIAC REHAB | Age: 61
Setting detail: THERAPIES SERIES
Discharge: HOME | End: 2021-10-13
Payer: COMMERCIAL

## 2021-10-13 PROCEDURE — 93798 PHYS/QHP OP CAR RHAB W/ECG: CPT

## 2021-10-15 ENCOUNTER — HOSPITAL ENCOUNTER (OUTPATIENT)
Dept: CARDIAC REHAB | Age: 61
Setting detail: THERAPIES SERIES
Discharge: HOME | End: 2021-10-15
Payer: COMMERCIAL

## 2021-10-15 PROCEDURE — 93798 PHYS/QHP OP CAR RHAB W/ECG: CPT

## 2021-10-17 ENCOUNTER — HOSPITAL ENCOUNTER (OUTPATIENT)
Dept: SLEEP CENTER | Age: 61
Discharge: HOME | End: 2021-10-19
Payer: COMMERCIAL

## 2021-10-17 DIAGNOSIS — F32.9 REACTIVE DEPRESSION: ICD-10-CM

## 2021-10-17 DIAGNOSIS — G47.33 OSA (OBSTRUCTIVE SLEEP APNEA): ICD-10-CM

## 2021-10-17 DIAGNOSIS — F41.9 ANXIETY: ICD-10-CM

## 2021-10-17 PROCEDURE — 95811 POLYSOM 6/>YRS CPAP 4/> PARM: CPT

## 2021-10-18 ENCOUNTER — HOSPITAL ENCOUNTER (OUTPATIENT)
Dept: CARDIAC REHAB | Age: 61
Setting detail: THERAPIES SERIES
Discharge: HOME | End: 2021-10-18
Payer: COMMERCIAL

## 2021-10-18 PROCEDURE — 93798 PHYS/QHP OP CAR RHAB W/ECG: CPT

## 2021-10-18 PROCEDURE — 95811 POLYSOM 6/>YRS CPAP 4/> PARM: CPT | Performed by: INTERNAL MEDICINE

## 2021-10-18 RX ORDER — SERTRALINE HYDROCHLORIDE 100 MG/1
TABLET, FILM COATED ORAL
Qty: 30 TABLET | Refills: 2 | Status: SHIPPED | OUTPATIENT
Start: 2021-10-18 | End: 2021-11-11 | Stop reason: ALTCHOICE

## 2021-10-19 ENCOUNTER — TELEPHONE (OUTPATIENT)
Dept: PULMONOLOGY | Age: 61
End: 2021-10-19

## 2021-10-19 DIAGNOSIS — G47.33 OSA (OBSTRUCTIVE SLEEP APNEA): Primary | ICD-10-CM

## 2021-10-20 ENCOUNTER — HOSPITAL ENCOUNTER (OUTPATIENT)
Dept: CARDIAC REHAB | Age: 61
Setting detail: THERAPIES SERIES
Discharge: HOME | End: 2021-10-20
Payer: COMMERCIAL

## 2021-10-20 PROCEDURE — 93798 PHYS/QHP OP CAR RHAB W/ECG: CPT

## 2021-10-21 RX ORDER — FLUTICASONE FUROATE, UMECLIDINIUM BROMIDE AND VILANTEROL TRIFENATATE 100; 62.5; 25 UG/1; UG/1; UG/1
1 POWDER RESPIRATORY (INHALATION) DAILY
Qty: 60 EACH | Refills: 5 | Status: SHIPPED | OUTPATIENT
Start: 2021-10-21 | End: 2022-03-28

## 2021-10-25 ENCOUNTER — HOSPITAL ENCOUNTER (OUTPATIENT)
Dept: CARDIAC REHAB | Age: 61
Setting detail: THERAPIES SERIES
Discharge: HOME | End: 2021-10-25
Payer: COMMERCIAL

## 2021-10-25 PROCEDURE — 93798 PHYS/QHP OP CAR RHAB W/ECG: CPT

## 2021-10-25 RX ORDER — BENZONATATE 200 MG/1
CAPSULE ORAL
Qty: 30 CAPSULE | Refills: 0 | Status: SHIPPED | OUTPATIENT
Start: 2021-10-25 | End: 2021-11-15

## 2021-10-29 ENCOUNTER — HOSPITAL ENCOUNTER (OUTPATIENT)
Dept: CARDIAC REHAB | Age: 61
Setting detail: THERAPIES SERIES
Discharge: HOME | End: 2021-10-29
Payer: COMMERCIAL

## 2021-10-29 PROCEDURE — 93798 PHYS/QHP OP CAR RHAB W/ECG: CPT

## 2021-11-01 ENCOUNTER — TELEPHONE (OUTPATIENT)
Dept: CARDIOLOGY CLINIC | Age: 61
End: 2021-11-01

## 2021-11-01 ENCOUNTER — HOSPITAL ENCOUNTER (OUTPATIENT)
Dept: CARDIAC REHAB | Age: 61
Setting detail: THERAPIES SERIES
Discharge: HOME | End: 2021-11-01
Payer: COMMERCIAL

## 2021-11-01 PROCEDURE — 93798 PHYS/QHP OP CAR RHAB W/ECG: CPT

## 2021-11-01 NOTE — TELEPHONE ENCOUNTER
Pt sts his B/P is low. 90/52. Pt goes to Cardiac Rehab and RN there said if his B/P is that low when he returns she was going to call. GLORIA. So pt decided to call himself. Pt sts he is tired and wants to sleep all the time. Please advise.

## 2021-11-02 NOTE — TELEPHONE ENCOUNTER
Called the patient, had to leave voicemail to call us back with updated blood pressure and update on symptoms.     GLORIA

## 2021-11-02 NOTE — TELEPHONE ENCOUNTER
Pt  B/P is 96/58. Pt sts he is lightheaded, dizzy,SOB,headache, tired and vomiting Q AM since last Thursday. Please advise.

## 2021-11-08 ENCOUNTER — TELEPHONE (OUTPATIENT)
Dept: CARDIOLOGY CLINIC | Age: 61
End: 2021-11-08

## 2021-11-08 ENCOUNTER — HOSPITAL ENCOUNTER (OUTPATIENT)
Dept: CARDIAC REHAB | Age: 61
Setting detail: THERAPIES SERIES
Discharge: HOME | End: 2021-11-08
Payer: COMMERCIAL

## 2021-11-08 PROCEDURE — 93798 PHYS/QHP OP CAR RHAB W/ECG: CPT

## 2021-11-08 NOTE — TELEPHONE ENCOUNTER
Requested BP log -    11/3/21 -  98/62 ~ 117/77  11/4/21 - 127/88 ~ 118/73  11/5/21 - 127/72 ~ 123/68  11/6/21 - 145/97 ~ 162/96  11/7/21 - 141/97 ~ 146/94    11/8/21 - 142/90    Has not taken metoprolol since 10/29/2021. Still extremely fatigued.

## 2021-11-09 NOTE — TELEPHONE ENCOUNTER
OK to continue to hold antihypertensives for now. If he is still feeling poorly, I recommend that we schedule him with an acute visit with one of the nurse practitioners later this week or with Dr. Colt Reyes when he returns next week.

## 2021-11-10 ENCOUNTER — HOSPITAL ENCOUNTER (OUTPATIENT)
Dept: CARDIAC REHAB | Age: 61
Setting detail: THERAPIES SERIES
Discharge: HOME | End: 2021-11-10
Payer: COMMERCIAL

## 2021-11-10 PROCEDURE — 93798 PHYS/QHP OP CAR RHAB W/ECG: CPT

## 2021-11-11 ENCOUNTER — OFFICE VISIT (OUTPATIENT)
Dept: FAMILY MEDICINE CLINIC | Age: 61
End: 2021-11-11
Payer: COMMERCIAL

## 2021-11-11 VITALS
OXYGEN SATURATION: 94 % | HEIGHT: 64 IN | SYSTOLIC BLOOD PRESSURE: 110 MMHG | WEIGHT: 295 LBS | BODY MASS INDEX: 50.36 KG/M2 | DIASTOLIC BLOOD PRESSURE: 62 MMHG | HEART RATE: 84 BPM

## 2021-11-11 DIAGNOSIS — Z13.21 ENCOUNTER FOR VITAMIN DEFICIENCY SCREENING: ICD-10-CM

## 2021-11-11 DIAGNOSIS — Z23 FLU VACCINE NEED: ICD-10-CM

## 2021-11-11 DIAGNOSIS — Z12.5 SCREENING FOR PROSTATE CANCER: ICD-10-CM

## 2021-11-11 DIAGNOSIS — K40.90 NON-RECURRENT UNILATERAL INGUINAL HERNIA WITHOUT OBSTRUCTION OR GANGRENE: ICD-10-CM

## 2021-11-11 DIAGNOSIS — E78.2 MIXED HYPERLIPIDEMIA: ICD-10-CM

## 2021-11-11 DIAGNOSIS — R73.9 HYPERGLYCEMIA: ICD-10-CM

## 2021-11-11 DIAGNOSIS — R20.2 PARESTHESIA OF BOTH HANDS: ICD-10-CM

## 2021-11-11 DIAGNOSIS — F32.9 REACTIVE DEPRESSION: Primary | ICD-10-CM

## 2021-11-11 DIAGNOSIS — F51.01 PRIMARY INSOMNIA: ICD-10-CM

## 2021-11-11 DIAGNOSIS — F41.9 ANXIETY: ICD-10-CM

## 2021-11-11 PROCEDURE — G8417 CALC BMI ABV UP PARAM F/U: HCPCS | Performed by: NURSE PRACTITIONER

## 2021-11-11 PROCEDURE — G8482 FLU IMMUNIZE ORDER/ADMIN: HCPCS | Performed by: NURSE PRACTITIONER

## 2021-11-11 PROCEDURE — 99214 OFFICE O/P EST MOD 30 MIN: CPT | Performed by: NURSE PRACTITIONER

## 2021-11-11 PROCEDURE — 3017F COLORECTAL CA SCREEN DOC REV: CPT | Performed by: NURSE PRACTITIONER

## 2021-11-11 PROCEDURE — 1036F TOBACCO NON-USER: CPT | Performed by: NURSE PRACTITIONER

## 2021-11-11 PROCEDURE — 36415 COLL VENOUS BLD VENIPUNCTURE: CPT | Performed by: NURSE PRACTITIONER

## 2021-11-11 PROCEDURE — 90674 CCIIV4 VAC NO PRSV 0.5 ML IM: CPT | Performed by: NURSE PRACTITIONER

## 2021-11-11 PROCEDURE — G8427 DOCREV CUR MEDS BY ELIG CLIN: HCPCS | Performed by: NURSE PRACTITIONER

## 2021-11-11 PROCEDURE — 90471 IMMUNIZATION ADMIN: CPT | Performed by: NURSE PRACTITIONER

## 2021-11-11 RX ORDER — DULOXETIN HYDROCHLORIDE 60 MG/1
60 CAPSULE, DELAYED RELEASE ORAL DAILY
Qty: 30 CAPSULE | Refills: 1 | Status: SHIPPED | OUTPATIENT
Start: 2021-11-11 | End: 2022-01-19

## 2021-11-11 SDOH — ECONOMIC STABILITY: FOOD INSECURITY: WITHIN THE PAST 12 MONTHS, THE FOOD YOU BOUGHT JUST DIDN'T LAST AND YOU DIDN'T HAVE MONEY TO GET MORE.: NEVER TRUE

## 2021-11-11 SDOH — ECONOMIC STABILITY: FOOD INSECURITY: WITHIN THE PAST 12 MONTHS, YOU WORRIED THAT YOUR FOOD WOULD RUN OUT BEFORE YOU GOT MONEY TO BUY MORE.: NEVER TRUE

## 2021-11-11 ASSESSMENT — PATIENT HEALTH QUESTIONNAIRE - PHQ9
SUM OF ALL RESPONSES TO PHQ QUESTIONS 1-9: 18
SUM OF ALL RESPONSES TO PHQ9 QUESTIONS 1 & 2: 6
4. FEELING TIRED OR HAVING LITTLE ENERGY: 3
6. FEELING BAD ABOUT YOURSELF - OR THAT YOU ARE A FAILURE OR HAVE LET YOURSELF OR YOUR FAMILY DOWN: 0
3. TROUBLE FALLING OR STAYING ASLEEP: 3
1. LITTLE INTEREST OR PLEASURE IN DOING THINGS: 3
10. IF YOU CHECKED OFF ANY PROBLEMS, HOW DIFFICULT HAVE THESE PROBLEMS MADE IT FOR YOU TO DO YOUR WORK, TAKE CARE OF THINGS AT HOME, OR GET ALONG WITH OTHER PEOPLE: 2
7. TROUBLE CONCENTRATING ON THINGS, SUCH AS READING THE NEWSPAPER OR WATCHING TELEVISION: 3
SUM OF ALL RESPONSES TO PHQ QUESTIONS 1-9: 18
8. MOVING OR SPEAKING SO SLOWLY THAT OTHER PEOPLE COULD HAVE NOTICED. OR THE OPPOSITE, BEING SO FIGETY OR RESTLESS THAT YOU HAVE BEEN MOVING AROUND A LOT MORE THAN USUAL: 3
9. THOUGHTS THAT YOU WOULD BE BETTER OFF DEAD, OR OF HURTING YOURSELF: 0
SUM OF ALL RESPONSES TO PHQ QUESTIONS 1-9: 18
2. FEELING DOWN, DEPRESSED OR HOPELESS: 3

## 2021-11-11 ASSESSMENT — COLUMBIA-SUICIDE SEVERITY RATING SCALE - C-SSRS
2. HAVE YOU ACTUALLY HAD ANY THOUGHTS OF KILLING YOURSELF?: NO
6. HAVE YOU EVER DONE ANYTHING, STARTED TO DO ANYTHING, OR PREPARED TO DO ANYTHING TO END YOUR LIFE?: NO
1. WITHIN THE PAST MONTH, HAVE YOU WISHED YOU WERE DEAD OR WISHED YOU COULD GO TO SLEEP AND NOT WAKE UP?: YES

## 2021-11-11 ASSESSMENT — SOCIAL DETERMINANTS OF HEALTH (SDOH): HOW HARD IS IT FOR YOU TO PAY FOR THE VERY BASICS LIKE FOOD, HOUSING, MEDICAL CARE, AND HEATING?: NOT HARD AT ALL

## 2021-11-11 NOTE — PROGRESS NOTES
daily, BuSpar 10 mg 3 times daily, Lexapro 10 mg daily, hydroxyzine 25 mg 3 times daily as needed, Paxil 40 mg daily as well as Zoloft 100 mg daily    PHQ-9  11/11/2021 3/1/2021 6/26/2020 1/6/2020 9/25/2019 9/11/2019   Little interest or pleasure in doing things 3 3 3 3 3 3   Feeling down, depressed, or hopeless 3 3 1 2 2 2   Trouble falling or staying asleep, or sleeping too much 3 3 3 3 2 3   Feeling tired or having little energy 3 3 3 2 3 3   Poor appetite or overeating - 0 0 3 2 3   Feeling bad about yourself - or that you are a failure or have let yourself or your family down 0 0 0 0 1 1   Trouble concentrating on things, such as reading the newspaper or watching television 3 3 0 2 1 1   Moving or speaking so slowly that other people could have noticed. Or the opposite - being so fidgety or restless that you have been moving around a lot more than usual 3 0 3 2 3 1   Thoughts that you would be better off dead, or of hurting yourself in some way 0 0 0 0 0 1   PHQ-2 Score 6 6 4 5 5 5   PHQ-9 Total Score 18 15 13 17 17 18   If you checked off any problems, how difficult have these problems made it for you to do your work, take care of things at home, or get along with other people?  2 2 1 2 2 1     Interpretation of Total Score Total Score Depression Severity: 1-4 = Minimal depression, 5-9 = Mild depression, 10-14 = Moderate depression, 15-19 = Moderately severe depression, 20-27 = Severe depression    Past Medical History:   Diagnosis Date    Acute diverticulitis 04/10/2021    Acute kidney injury ([facility] 04/08/2021    Acute on chronic respiratory failure with hypoxemia (HCC)     Acute respiratory failure ([address] 08/19/2020    Acute respiratory failure with hypoxia ([facility]     Anxiety 01/06/2020    Aortic valve calcification 09/2020    seen on lung CT    CAD in native artery 04/14/2021    Chronic obstructive pulmonary disease ([facility] 09/03/2019    PFT done 9/03/19    Chronic systolic congestive heart failure ([facility] 04/10/2021    Class 3 severe obesity with body mass index (BMI) of 45.0 to 49.9 in adult [facility])     Coronary artery calcification 09/2020    seen on lung ct    Coronary artery disease involving native heart with angina pectoris ([facility] 09/22/2020    Crush injury of right foot 07/23/2015    DDD (degenerative disc disease), lumbar 01/06/2020    Depression 01/06/2020    Diverticulitis of colon 04/10/2021    Erectile dysfunction 01/06/2020    Fatigue 01/06/2020    HNP (herniated nucleus pulposus), lumbar 01/06/2020    Hyperglycemia 01/06/2020    Hyperlipidemia     Hypersomnia 01/06/2020    Hypertension     Insomnia     Ischemic cardiomyopathy     Kidney stone     Lumbar radiculopathy 01/06/2020    Lumbar spine pain 01/06/2020    LVH (left ventricular hypertrophy)     seen on echo 8/2020, moderate    Mobitz type I Wenckebach atrioventricular block 04/10/2021    Moderate protein-calorie malnutrition ([facility] 03/17/2020    Observed sleep apnea 01/06/2020    OANH (obstructive sleep apnea) 01/06/2020    no C-pap is getting tested    Pneumonia, primary atypical     Reactive depression 01/06/2020    S/P three vessel coronary artery bypass 10/26/2020    Spondylosis without myelopathy or radiculopathy, lumbar region 01/06/2020    Tobacco abuse 01/06/2020    Tobacco use 01/06/2020    Wears dentures     full set         Review of Systems   Constitutional: Negative. Negative for appetite change, fatigue and unexpected weight change. HENT: Negative. Eyes: Negative. Respiratory: Positive for cough. Negative for shortness of breath. Cardiovascular: Negative. Negative for chest pain, palpitations and leg swelling. Gastrointestinal: Positive for abdominal pain. Negative for anal bleeding, blood in stool and nausea. Endocrine: Negative. Genitourinary: Positive for testicular pain.  Negative for decreased urine volume, difficulty urinating, dysuria, enuresis, flank pain, frequency, genital sores, hematuria, penile discharge, penile pain, penile swelling, scrotal swelling and urgency. Musculoskeletal: Positive for arthralgias and gait problem. Skin: Negative. Negative for rash. Allergic/Immunologic: Negative. Neurological: Positive for dizziness. Negative for syncope, light-headedness and numbness. Hematological: Negative. Does not bruise/bleed easily. Psychiatric/Behavioral: Negative. All other systems reviewed and are negative.        Past Medical History:   Diagnosis Date    Acute diverticulitis 04/10/2021    Acute kidney injury ([address] 04/08/2021    Acute on chronic respiratory failure with hypoxemia (HCC)     Acute respiratory failure ([address] 08/19/2020    Acute respiratory failure with hypoxia ([address]     Anxiety 01/06/2020    Aortic valve calcification 09/2020    seen on lung CT    CAD in native artery 04/14/2021    Chronic obstructive pulmonary disease ([address] 09/03/2019    PFT done 9/03/19    Chronic systolic congestive heart failure ([address] 04/10/2021    Class 3 severe obesity with body mass index (BMI) of 45.0 to 49.9 in adult [facility])     Coronary artery calcification 09/2020    seen on lung ct    Coronary artery disease involving native heart with angina pectoris ([address] 09/22/2020    Crush injury of right foot 07/23/2015    DDD (degenerative disc disease), lumbar 01/06/2020    Depression 01/06/2020    Diverticulitis of colon 04/10/2021    Erectile dysfunction 01/06/2020    Fatigue 01/06/2020    HNP (herniated nucleus pulposus), lumbar 01/06/2020    Hyperglycemia 01/06/2020    Hyperlipidemia     Hypersomnia 01/06/2020    Hypertension     Insomnia     Ischemic cardiomyopathy     Kidney stone     Lumbar radiculopathy 01/06/2020    Lumbar spine pain 01/06/2020    LVH (left ventricular hypertrophy)     seen on echo 8/2020, moderate    Mobitz type I Wenckebach atrioventricular block 04/10/2021    Moderate protein-calorie malnutrition ([address] 03/17/2020    Observed sleep apnea 01/06/2020    OANH (obstructive sleep apnea) 01/06/2020    no C-pap is getting tested    Pneumonia, primary atypical     Reactive depression 01/06/2020    S/P three vessel coronary artery bypass 10/26/2020    Spondylosis without myelopathy or radiculopathy, lumbar region 01/06/2020    Tobacco abuse 01/06/2020    Tobacco use 01/06/2020    Wears dentures     full set     Family History   Problem Relation Age of Onset    Heart Disease Mother     Other Mother         copd    High Blood Pressure Sister     No Known Problems Sister      Past Surgical History:   Procedure Laterality Date    ANKLE SURGERY Left 8/2/2021    LEFT FOOT SCREW REMOVAL performed by Erin Reeder MD at [address]  03/2021    CORONARY ARTERY BYPASS GRAFT N/A 9/25/2020    CORONARY ARTERY BYPASS GRAFTING X3, INTERNAL MAMMARY ARTERY, SAPHENOUS VEIN GRAFT, ON PUMP, STERNAL PLATING, 5 LEVEL BILATERAL INTERCOSTAL NERVE BLOCK, PLATELET GEL APPLICATION performed by Queenie Nunes MD at [address] [facility]  03/16/2011    cystoscopy left ureteroscopy, left retrograde, double J stent placement    FINGER AMPUTATION Right 2/2/2021    RIGHT MIDDLE FINGER IRRIGATION AND DEBRIDEMENT WITH REVISION AMPUTATION AND BONE SHORTENING, POSSIBLE NAIL ABLATION performed by Hadley Burciaga MD at [address] [facility] Right 12/24/2019    RIGHT LUMBAR FIVE SACRAL ONE TRANSFORAMINAL EPIDURAL STEROID INJECTION SITE CONFIRMED BY FLUOROSCOPY performed by Nicole Brown MD at [address] [facility] Right 1/7/2020    RIGHT LUMBAR FOUR AND LUMBAR FIVE TRANSFORAMINAL EPIDURAL STEROID INJECTION SITE CONFIRMED BY FLUOROSCOPY performed by Nicole Brown MD at [address] History     Socioeconomic History    Marital status:      Spouse name: Not on file    Number of children: Not on file    Years of education: Not on file    Highest education level: Not on file   Occupational History    Not on file   Tobacco Use    Smoking status: Former Smoker     Packs/day: 2.00     Years: 35.00     Pack years: 70.00     Types: Cigarettes     Quit date: 3/1/2020     Years since quittin.6    Smokeless tobacco: Never Used   Vaping Use    Vaping Use: Never used   Substance and Sexual Activity    Alcohol use: No    Drug use: Not Currently     Types: Marijuana Allan Blow), Cocaine     Comment: hx of drug use 30 yrs ago    Sexual activity: Not Currently   Other Topics Concern    Not on file   Social History Narrative    Not on file     Social Determinants of Health     Financial Resource Strain: Low Risk     Difficulty of Paying Living Expenses: Not hard at all   Food Insecurity: No Food Insecurity    Worried About Running Out of Food in the Last Year: Never true    Theresa of Food in the Last Year: Never true   Transportation Needs:     Lack of Transportation (Medical): Not on file    Lack of Transportation (Non-Medical):  Not on file   Physical Activity:     Days of Exercise per Week: Not on file    Minutes of Exercise per Session: Not on file   Stress:     Feeling of Stress : Not on file   Social Connections:     Frequency of Communication with Friends and Family: Not on file    Frequency of Social Gatherings with Friends and Family: Not on file    Attends [employer] Services: Not on file    Active Member of [address] [place] [address] or Organizations: Not on file    Attends Club or Organization Meetings: Not on file    Marital Status: Not on file   Intimate Partner Violence:     Fear of Current or Ex-Partner: Not on file    Emotionally Abused: Not on file    Physically Abused: Not on file    Sexually Abused: Not on file   Housing Stability:     Unable to Pay for Housing in the Last Year: Not on file    Number of [place] in the Last Year: Not on file    Unstable Housing in the Last Year: Not on file     Current Outpatient Medications   Medication Sig Dispense Refill    benzonatate (TESSALON) 200 MG capsule TAKE ONE CAPSULE BY MOUTH THREE TIMES A DAY AS NEEDED FOR COUGH 30 capsule 0    fluticasone-umeclidin-vilant (TRELEGY ELLIPTA) 100-62.5-25 MCG/INH AEPB Inhale 1 puff into the lungs daily 60 each 5    sertraline (ZOLOFT) 100 MG tablet TAKE ONE TABLET BY MOUTH DAILY 30 tablet 2    albuterol (PROVENTIL) (2.5 MG/3ML) 0.083% nebulizer solution Take 3 mLs by nebulization every 6 hours as needed for Wheezing or Shortness of Breath 360 each 3    OXYGEN Inhale 2 L into the lungs as needed       meclizine (ANTIVERT) 25 MG tablet Take 1 tablet by mouth 3 times daily as needed for Dizziness 30 tablet 0    nitroGLYCERIN (NITROSTAT) 0.4 MG SL tablet Place 1 tablet under the tongue every 5 minutes as needed for Chest pain 25 tablet 3    furosemide (LASIX) 80 MG tablet Take 0.5 tablets by mouth daily 45 tablet 3    traZODone (DESYREL) 100 MG tablet Take 1 tablet by mouth nightly (Patient taking differently: Take 100 mg by mouth 2 times daily Am pm) 30 tablet 5    clopidogrel (PLAVIX) 75 MG tablet Take 1 tablet by mouth daily 90 tablet 3    atorvastatin (LIPITOR) 80 MG tablet Take 1 tablet by mouth nightly (Patient taking differently: Take 40 mg by mouth nightly ) 90 tablet 3    aspirin 81 MG EC tablet Take 1 tablet by mouth daily 30 tablet 0    albuterol sulfate HFA (PROVENTIL HFA) 108 (90 Base) MCG/ACT inhaler Inhale 2 puffs into the lungs every 6 hours as needed for Wheezing 1 Inhaler 0    metoprolol succinate (TOPROL XL) 25 MG extended release tablet Take 1 tablet by mouth daily 30 tablet 11     No current facility-administered medications for this visit. No changes in past medical history, past surgical history, social history, orfamily history were noted during the patient encounter unless specifically listed above.   All updates of past medical history, past surgical history, social history, or family history were reviewed personally by me duringthe office visit. All problems listed in the assessment are stable unless noted otherwise. Medication profile reviewed personally by me during the office visit. Medication side effects and possible impairments frommedications were discussed as applicable. Objective:     Physical Exam  Vitals and nursing note reviewed. Constitutional:       General: He is not in acute distress. Appearance: Normal appearance. He is well-developed. He is obese. HENT:      Head: Normocephalic and atraumatic. Right Ear: Tympanic membrane, ear canal and external ear normal.      Left Ear: Tympanic membrane, ear canal and external ear normal.      Nose: Nose normal.   Eyes:      General: Lids are normal.      Conjunctiva/sclera: Conjunctivae normal.      Pupils: Pupils are equal, round, and reactive to light. Neck:      Thyroid: No thyromegaly. Vascular: No carotid bruit or JVD. Cardiovascular:      Rate and Rhythm: Normal rate and regular rhythm. Pulses: Normal pulses. Radial pulses are 2+ on the right side and 2+ on the left side. Dorsalis pedis pulses are 2+ on the right side and 2+ on the left side. Posterior tibial pulses are 2+ on the right side and 2+ on the left side. Heart sounds: Normal heart sounds. No murmur heard. No friction rub. No gallop. Pulmonary:      Effort: Pulmonary effort is normal.      Breath sounds: Normal breath sounds. Abdominal:      General: Bowel sounds are normal.      Palpations: Abdomen is soft. There is no mass. Tenderness: There is no abdominal tenderness. Genitourinary:     Penis: Normal. No phimosis, erythema or discharge. Testes:         Right: Tenderness present. Comments: Palpable bulging with cough in right groin region on exam  Musculoskeletal:         General: Normal range of motion. Cervical back: Normal range of motion and neck supple.    Lymphadenopathy:      Head: Right side of head: No submandibular adenopathy. Left side of head: No submandibular adenopathy. Cervical: No cervical adenopathy. Lower Body: No right inguinal adenopathy. Skin:     General: Skin is warm and dry. Findings: No lesion or rash. Neurological:      Mental Status: He is alert and oriented to person, place, and time. Gait: Gait normal.   Psychiatric:         Attention and Perception: Attention normal.         Mood and Affect: Mood is anxious and depressed. Speech: Speech normal.         Behavior: Behavior normal. Behavior is cooperative. Thought Content: Thought content normal. Thought content does not include homicidal or suicidal ideation. Thought content does not include homicidal or suicidal plan. Cognition and Memory: Cognition normal.         Judgment: Judgment normal.         /62 (Site: Left Upper Arm, Position: Sitting, Cuff Size: Large Adult)   Pulse 84   Ht 5' 4\" (1.626 m)   Wt 295 lb (133.8 kg)   SpO2 94%   BMI 50.64 kg/m²   Body mass index is 50.64 kg/m². BP Readings from Last 2 Encounters:   11/11/21 110/62   10/08/21 100/60       Wt Readings from Last 3 Encounters:   11/11/21 295 lb (133.8 kg)   10/08/21 295 lb 8 oz (134 kg)   09/17/21 284 lb 8 oz (129 kg)       Lab Review   No visits with results within 2 Month(s) from this visit.    Latest known visit with results is:   Admission on 09/05/2021, Discharged on 09/06/2021   Component Date Value    Ventricular Rate 09/05/2021 70     Atrial Rate 09/05/2021 70     P-R Interval 09/05/2021 190     QRS Duration 09/05/2021 74     Q-T Interval 09/05/2021 386     QTc Calculation (Bazett) 09/05/2021 416     P Axis 09/05/2021 34     R Axis 09/05/2021 -37     T Axis 09/05/2021 91     Diagnosis 09/05/2021 Normal sinus rhythmLeft axis deviationLow voltage QRSInferior infarct (cited on or before 19-APR-2021)Cannot rule out Anterior infarct , age undeterminedAbnormal ECGConfirmed by Janey Richards MD, Bernabe Couch ([phone]) on 9/7/2021 1:03:35 PM     WBC 09/05/2021 13.8*    RBC 09/05/2021 5.45     Hemoglobin 09/05/2021 14.5     Hematocrit 09/05/2021 44.6     MCV 09/05/2021 81.8     MCH 09/05/2021 26.6     MCHC 09/05/2021 32.5     RDW 09/05/2021 16.6*    Platelets 27/69/4587 205     MPV 09/05/2021 8.2     Neutrophils % 09/05/2021 75.8     Lymphocytes % 09/05/2021 14.5     Monocytes % 09/05/2021 6.0     Eosinophils % 09/05/2021 2.5     Basophils % 09/05/2021 1.2     Neutrophils Absolute 09/05/2021 10.4*    Lymphocytes Absolute 09/05/2021 2.0     Monocytes Absolute 09/05/2021 0.8     Eosinophils Absolute 09/05/2021 0.3     Basophils Absolute 09/05/2021 0.2     Pro-BNP 09/05/2021 298*    pH, Evan 09/05/2021 7.483*    pCO2, Evan 09/05/2021 40.4     pO2, Evan 09/05/2021 51.1*    HCO3, Venous 09/05/2021 29.6*    Base Excess, Evan 09/05/2021 5.7*    O2 Sat, Evan 09/05/2021 89     Carboxyhemoglobin 09/05/2021 2.5*    MetHgb, Evan 09/05/2021 0.3     TC02 (Calc), Evan 09/05/2021 31     O2 Therapy 09/05/2021 Unknown     SARS-CoV-2 RNA, RT PCR 09/05/2021 NOT DETECTED     INFLUENZA A 09/05/2021 NOT DETECTED     INFLUENZA B 09/05/2021 NOT DETECTED     Sodium 09/05/2021 140     Potassium reflex Magnesi* 09/05/2021 3.6     Chloride 09/05/2021 100     CO2 09/05/2021 31     Anion Gap 09/05/2021 9     Glucose 09/05/2021 96     BUN 09/05/2021 13     CREATININE 09/05/2021 0.9     GFR Non- 09/05/2021 >60     GFR  09/05/2021 >60     Calcium 09/05/2021 8.9     Total Protein 09/05/2021 6.6     Albumin 09/05/2021 3.4     Albumin/Globulin Ratio 09/05/2021 1.1     Total Bilirubin 09/05/2021 0.5     Alkaline Phosphatase 09/05/2021 98     ALT 09/05/2021 18     AST 09/05/2021 18     Globulin 09/05/2021 3.2     Troponin 09/05/2021 <0.01        No results found for this visit on 11/11/21. Assessment:       1. Reactive depression    2. Anxiety    3. Non-recurrent unilateral inguinal hernia without obstruction or gangrene    4. Paresthesia of both hands    5. Primary insomnia    6. Mixed hyperlipidemia    7. Hyperglycemia    8. Flu vaccine need    9. Screening for prostate cancer    10. Encounter for vitamin deficiency screening        No results found for this visit on 11/11/21. Plan:       Juwan Stephens was seen today for depression, anxiety, hernia and other. Diagnoses and all orders for this visit:    Reactive depression  Start   -     DULoxetine (CYMBALTA) 60 MG extended release capsule; Take 1 capsule by mouth daily  -     External Referral to Psychiatry  -     CBC Auto Differential    Anxiety  Discontinue zoloft  Start   -     DULoxetine (CYMBALTA) 60 MG extended release capsule; Take 1 capsule by mouth daily  -     External Referral to Psychiatry  Educated if patient develops SI/HI/lev to call 911 or go to ER. Discussed use, benefit, risks and side effects of prescribed medications. Barriers to compliance discussed. All patient questions answered. Pt voiced understanding. Non-recurrent unilateral inguinal hernia without obstruction or gangrene  Reviewed ct scans done previously. No hernia mentioned on any scans in the abdomen even though patient states cardiology told him he had one after doing open heart surgery. Palpable bulge on exam right groin with cough. Refer for surgical evaluation.   -     Laureen Cowden, MD, General Surgery, [facility]    Paresthesia of both hands  -     EMG; Future  -     CBC Auto Differential    Primary insomnia  Continue trazodone    Mixed hyperlipidemia  -     Comprehensive Metabolic Panel  -     TSH without Reflex  -     Lipid Panel  The patient is asked to make an attempt to improve diet and exercise patterns to aid in medical management of this problem.     Hyperglycemia  -     Hemoglobin A1C    Flu vaccine need  -     INFLUENZA, MDCK QUADV, 2 YRS AND OLDER, IM, PF, PREFILL SYR OR SDV, 0.5ML (FLUCELVAX QUADV, PF)    Screening for prostate cancer  -     PSA screening    Encounter for vitamin deficiency screening  -     Vitamin D 25 Hydroxy        Patient has been instructed call the office immediately with new symptoms, change in symptoms or worseningof symptoms. If this is not feasible, patient is instructed to report to the emergency room. Medication profile reviewed. Medication side effects and possible impairments from medications were discussed as applicable. Allergies were reviewed. Health maintenance was reviewed and updated as appropriate. Return in about 6 weeks (around 12/23/2021) for Anxiety/Depression. (Comment: Please note this report has been produced using a combination of typing and speech recognition software and may contain errors related to that system including errors in grammar, punctuation, and spelling, as well as words and phrases that may be inappropriate.  If there are any questions or concerns please feel free to contact the dictating provider for clarification.)

## 2021-11-12 LAB
A/G RATIO: 1.6 (ref 1.1–2.2)
ALBUMIN SERPL-MCNC: 4.1 G/DL (ref 3.4–5)
ALP BLD-CCNC: 114 U/L (ref 40–129)
ALT SERPL-CCNC: 17 U/L (ref 10–40)
ANION GAP SERPL CALCULATED.3IONS-SCNC: 12 MMOL/L (ref 3–16)
AST SERPL-CCNC: 19 U/L (ref 15–37)
BASOPHILS ABSOLUTE: 0.1 K/UL (ref 0–0.2)
BASOPHILS RELATIVE PERCENT: 0.7 %
BILIRUB SERPL-MCNC: 0.5 MG/DL (ref 0–1)
BUN BLDV-MCNC: 17 MG/DL (ref 7–20)
CALCIUM SERPL-MCNC: 9.3 MG/DL (ref 8.3–10.6)
CHLORIDE BLD-SCNC: 100 MMOL/L (ref 99–110)
CHOLESTEROL, TOTAL: 82 MG/DL (ref 0–199)
CO2: 27 MMOL/L (ref 21–32)
CREAT SERPL-MCNC: 0.9 MG/DL (ref 0.9–1.3)
EOSINOPHILS ABSOLUTE: 0.5 K/UL (ref 0–0.6)
EOSINOPHILS RELATIVE PERCENT: 4.6 %
ESTIMATED AVERAGE GLUCOSE: 134.1 MG/DL
GFR AFRICAN AMERICAN: >60
GFR NON-AFRICAN AMERICAN: >60
GLUCOSE BLD-MCNC: 104 MG/DL (ref 70–99)
HBA1C MFR BLD: 6.3 %
HCT VFR BLD CALC: 46.5 % (ref 40.5–52.5)
HDLC SERPL-MCNC: 34 MG/DL (ref 40–60)
HEMOGLOBIN: 14.8 G/DL (ref 13.5–17.5)
LDL CHOLESTEROL CALCULATED: 23 MG/DL
LYMPHOCYTES ABSOLUTE: 1.6 K/UL (ref 1–5.1)
LYMPHOCYTES RELATIVE PERCENT: 15.6 %
MCH RBC QN AUTO: 27.4 PG (ref 26–34)
MCHC RBC AUTO-ENTMCNC: 31.8 G/DL (ref 31–36)
MCV RBC AUTO: 86.3 FL (ref 80–100)
MONOCYTES ABSOLUTE: 0.6 K/UL (ref 0–1.3)
MONOCYTES RELATIVE PERCENT: 5.7 %
NEUTROPHILS ABSOLUTE: 7.4 K/UL (ref 1.7–7.7)
NEUTROPHILS RELATIVE PERCENT: 73.4 %
PDW BLD-RTO: 18.4 % (ref 12.4–15.4)
PLATELET # BLD: 196 K/UL (ref 135–450)
PMV BLD AUTO: 8.3 FL (ref 5–10.5)
POTASSIUM SERPL-SCNC: 4.9 MMOL/L (ref 3.5–5.1)
PROSTATE SPECIFIC ANTIGEN: 1.04 NG/ML (ref 0–4)
RBC # BLD: 5.39 M/UL (ref 4.2–5.9)
SODIUM BLD-SCNC: 139 MMOL/L (ref 136–145)
TOTAL PROTEIN: 6.6 G/DL (ref 6.4–8.2)
TRIGL SERPL-MCNC: 126 MG/DL (ref 0–150)
TSH SERPL DL<=0.05 MIU/L-ACNC: 1.02 UIU/ML (ref 0.27–4.2)
VITAMIN D 25-HYDROXY: 18.3 NG/ML
VLDLC SERPL CALC-MCNC: 25 MG/DL
WBC # BLD: 10 K/UL (ref 4–11)

## 2021-11-15 ENCOUNTER — NURSE ONLY (OUTPATIENT)
Dept: CARDIOLOGY CLINIC | Age: 61
End: 2021-11-15
Payer: COMMERCIAL

## 2021-11-15 ENCOUNTER — OFFICE VISIT (OUTPATIENT)
Dept: CARDIOLOGY CLINIC | Age: 61
End: 2021-11-15
Payer: COMMERCIAL

## 2021-11-15 VITALS
HEART RATE: 90 BPM | WEIGHT: 294.5 LBS | HEIGHT: 64 IN | BODY MASS INDEX: 50.28 KG/M2 | DIASTOLIC BLOOD PRESSURE: 70 MMHG | OXYGEN SATURATION: 98 % | SYSTOLIC BLOOD PRESSURE: 120 MMHG

## 2021-11-15 DIAGNOSIS — I46.9 ASYSTOLE (HCC): ICD-10-CM

## 2021-11-15 DIAGNOSIS — I44.1 MOBITZ TYPE I WENCKEBACH ATRIOVENTRICULAR BLOCK: ICD-10-CM

## 2021-11-15 DIAGNOSIS — I49.5 SSS (SICK SINUS SYNDROME) (HCC): Primary | ICD-10-CM

## 2021-11-15 DIAGNOSIS — I25.5 ISCHEMIC CARDIOMYOPATHY: ICD-10-CM

## 2021-11-15 DIAGNOSIS — Z95.0 PACEMAKER: ICD-10-CM

## 2021-11-15 DIAGNOSIS — I44.2 CHB (COMPLETE HEART BLOCK) (HCC): ICD-10-CM

## 2021-11-15 DIAGNOSIS — R00.1 SINUS BRADYCARDIA: ICD-10-CM

## 2021-11-15 DIAGNOSIS — I10 ESSENTIAL HYPERTENSION: ICD-10-CM

## 2021-11-15 DIAGNOSIS — I49.5 SSS (SICK SINUS SYNDROME) (HCC): ICD-10-CM

## 2021-11-15 DIAGNOSIS — I25.10 CORONARY ARTERY DISEASE INVOLVING NATIVE CORONARY ARTERY OF NATIVE HEART WITHOUT ANGINA PECTORIS: ICD-10-CM

## 2021-11-15 PROCEDURE — 93280 PM DEVICE PROGR EVAL DUAL: CPT | Performed by: INTERNAL MEDICINE

## 2021-11-15 PROCEDURE — 99214 OFFICE O/P EST MOD 30 MIN: CPT | Performed by: NURSE PRACTITIONER

## 2021-11-15 NOTE — PROGRESS NOTES
[address]   Electrophysiology Outpatient Note              Date:  November 15, 2021  Patient name: Kofi Perry  YOB: 1960    Primary Care physician: ANICETO Ellison CNP    HISTORY OF PRESENT ILLNESS: The patient is a 63 y.o.  male with a history of SSS, CAD, ICM, CHF, HTN, HLD, OANH, COPD, diverticulitis, obesity, anxiety and chronic respiratory failure. In 9/2020, he was admitted with chest pain. Stress testing was abnormal. He had intermittent 2:1 AV conduction and CHB. LHC showed MVD and he underwent CABG. In 4/2021, he was admitted with chest pain. Stress testing was abnormal. LHC showed 2 out of 3 bypass grafts were occluded. He underwent high risk PCI. While hospitalized, he was noted to have PVC's and intermittent nocturnal CHB and one instance of daytime Mobitz I. He wore a monitor at discharge that showed predominately SR with an average HR of 71 (). PAC burden 0.63%, PVC burden 0.01%, longest pause lasting 3.5 seconds. On 7/23/2021, he had a loop recorded implanted. Device interrogation showed numerous bradycardia/pause events as well as Mobitz I and CHB. On 8/26/2021, he underwent dual chamber pacemaker implant and loop explant. On 9/2/2021, device check showed AP 3.6%,  9.1% and no arrhythmias. Today he is being seen for SSS and HTN. Patient complains of ongoing fatigue and depression. He also reports pain across chest with any activity. Describes as a muscle strain. Pain resolves with rest. He has no taken nitro for this. He also complains of migraines, dizziness and ongoing SOB. He has been wearing his CPAP for sleep (at night and during naps). He is scheduled to see a psychiatrist tomorrow given his ongoing depression.      Device check today shows:   Brand: [employer]  Mode: DDD  Normal function   4.1% AP  13.9%     Arrhythmias: NSVT x 2 (longest 2 seconds)  Battery life 14.4 years  RA impedance 380 ohms   RV impedance 551 ohms   RA threshold 0.500 V @ 0.40 ms  RV threshold 0.625 V @ 0.40 ms  RA sensitivity 0.30 mV  RV sensitivity 0.90 mV      Past Medical History:   has a past medical history of Acute diverticulitis, Acute kidney injury ([facility] [address] Acute on chronic respiratory failure with hypoxemia ([facility] [address] Acute respiratory failure ([facility] [address] Acute respiratory failure with hypoxia (HCC), Anxiety, Aortic valve calcification, CAD in native artery, Chronic obstructive pulmonary disease (HCC), Chronic systolic congestive heart failure (HCC), Class 3 severe obesity with body mass index (BMI) of 45.0 to 49.9 in [facility]), Coronary artery calcification, Coronary artery disease involving native heart with angina pectoris ([facility] [address] Crush injury of right foot, DDD (degenerative disc disease), lumbar, Depression, Diverticulitis of colon, Erectile dysfunction, Fatigue, HNP (herniated nucleus pulposus), lumbar, Hyperglycemia, Hyperlipidemia, Hypersomnia, Hypertension, Insomnia, Ischemic cardiomyopathy, Kidney stone, Lumbar radiculopathy, Lumbar spine pain, LVH (left ventricular hypertrophy), Mobitz type I Wenckebach atrioventricular block, Moderate protein-calorie malnutrition ([facility] [address] Observed sleep apnea, OANH (obstructive sleep apnea), Pneumonia, primary atypical, Reactive depression, S/P three vessel coronary artery bypass, Spondylosis without myelopathy or radiculopathy, lumbar region, Tobacco abuse, Tobacco use, and Wears dentures. Past Surgical History:   has a past surgical history that includes Cholecystectomy; Cystoscopy (03/16/2011); Pain management procedure (Right, 12/24/2019); Pain management procedure (Right, 1/7/2020); Coronary artery bypass graft (N/A, 9/25/2020); Finger amputation (Right, 2/2/2021); Coronary angioplasty with stent (03/2021); and Ankle surgery (Left, 8/2/2021). Home Medications:    Prior to Admission medications    Medication Sig Start Date End Date Taking?  Authorizing Provider   benzonatate (TESSALON) 200 MG capsule TAKE 1 CAPSULE BY MOUTH THREE TIMES A DAY AS NEEDED FOR COUGH 11/15/21  Yes ANICETO Disla CNP   albuterol (PROVENTIL) (2.5 MG/3ML) 0.083% nebulizer solution Take 3 mLs by nebulization every 6 hours as needed for Wheezing or Shortness of Breath 9/9/21  Yes Jose L Alvarado MD   OXYGEN Inhale 2 L into the lungs as needed    Yes Historical Provider, MD   meclizine (ANTIVERT) 25 MG tablet Take 1 tablet by mouth 3 times daily as needed for Dizziness 5/5/21  Yes ANICETO Disla CNP   nitroGLYCERIN (NITROSTAT) 0.4 MG SL tablet Place 1 tablet under the tongue every 5 minutes as needed for Chest pain 4/29/21  Yes Briana Valdez MD   furosemide (LASIX) 80 MG tablet Take 0.5 tablets by mouth daily 4/20/21 4/20/22 Yes ANICETO Gallegos CNP   clopidogrel (PLAVIX) 75 MG tablet Take 1 tablet by mouth daily 1/13/21  Yes Briana Valdez MD   atorvastatin (LIPITOR) 80 MG tablet Take 1 tablet by mouth nightly  Patient taking differently: Take 40 mg by mouth nightly  10/28/20  Yes Briana Valdez MD   aspirin 81 MG EC tablet Take 1 tablet by mouth daily 9/30/20  Yes ANICETO Woods CNP   albuterol sulfate HFA (PROVENTIL HFA) 108 (90 Base) MCG/ACT inhaler Inhale 2 puffs into the lungs every 6 hours as needed for Wheezing 5/17/19  Yes Kacey Gastelum MD   traZODone (DESYREL) 100 MG tablet Take 1 tablet by mouth 2 times daily Am pm  Patient not taking: Reported on 11/15/2021 11/15/21   ANICETO Disla - CNP   DULoxetine (CYMBALTA) 60 MG extended release capsule Take 1 capsule by mouth daily 11/11/21   ANICETO Disla - ROXANA   fluticasone-umeclidin-vilant (TRELEGY ELLIPTA) 100-62.5-25 MCG/INH AEPB Inhale 1 puff into the lungs daily 10/21/21   Jose L Alvarado MD   metoprolol succinate (TOPROL XL) 25 MG extended release tablet Take 1 tablet by mouth daily  Patient not taking: Reported on 11/15/2021 4/20/21   Tia Manual, APRN - CNP       Allergies:  Dilaudid [hydromorphone hcl] and Codeine    Social History: reports that he quit smoking about 20 months ago. His smoking use included cigarettes. He has a 70.00 pack-year smoking history. He has never used smokeless tobacco. He reports previous drug use. Drugs: Marijuana (Weed) and Cocaine. He reports that he does not drink alcohol. Family History: family history includes Heart Disease in his mother; High Blood Pressure in his sister; No Known Problems in his sister; Other in his mother. All 14 point review of systems are completed and pertinent positives are mentioned in the history of present illness. Other systems are reviewed and are negative. PHYSICAL EXAM:    Vital signs:    /70   Pulse 90   Ht 5' 4\" (1.626 m)   Wt 294 lb 8 oz (133.6 kg)   SpO2 98%   BMI 50.55 kg/m²      Constitutional and general appearance: alert, cooperative, no distress and appears stated age  HEENT: PERRL, no cervical lymphadenopathy. No masses palpable. Normal oral mucosa  Respiratory:  · Normal excursion and expansion without use of accessory muscles  · Resp auscultation: Normal breath sounds without wheezing, rhonchi, and rales  Cardiovascular:  · The apical impulse is not displaced  · Heart tones are crisp and normal. regular S1 and S2.  · Jugular venous pulsation Normal  · The carotid upstroke is normal in amplitude and contour without delay or bruit  · Peripheral pulses are symmetrical and full   Abdomen:  · No masses or tenderness  · Bowel sounds present  Extremities:  ·  No cyanosis or clubbing  ·  1+ lower extremity edema  ·  Skin: warm and dry  Neurological:  · Alert and oriented  · Moves all extremities well  · No abnormalities of mood, affect, memory, mentation, or behavior are noted    DATA:    Echo 3/2021:      Conclusions      Summary   Limited echo for left ventricle systolic function. Definity is used for   myocardial border enhancement. LV systolic function is mildly reduced with a visually estimated EF=45-50%.    The left ventricle is normal in size with normal wall thickness. More prominent Inferior HK on certain views. Indeterminate diastolic function. Stress 4/2021:   Summary    Large area, reversible, moderate to severe intensity perfusion defect    involving the basal to apical anterolateral, mid to apical anterior and    apex. Mixed ischemia/scar of the inferolateral territory. Decreased wall    motion and myocardial thickening of the above segments. Post-stress LVEF    visually estimated 45-50%. Overall findings represent a high risk scan. Cath 4/19/2021:     [facility] 4/19/21   Left heart catheterization     LVEDP  5      See diagnostic catheterization report for full details, would add that there is severe tortuosity and eccentric plaque within the left main, LAD and D1 which proximal to mid was 75% and distally was 99%.       CONCLUSIONS:      Successful rotational atherectomy and PCI of D1 with single drug-eluting stent  Successful rotational atherectomy and PCI of left main/LAD with single drug-eluting stent  Medical therapy for LCx , this lesion does not appear to be amenable for PCI      Cath 4/2021:  FINDINGS      LVGRAM     LVEDP  6   GRADIENT ACROSS AORTIC VALVE  none   LV FUNCTION EF 60%   WALL MOTION  normal   MITRAL REGURGITATION  mild         CORONARY ARTERIES     LM Proximal less than 10% stenosis, mid-distal haziness with calcification and 70% eccentric stenosis.       LAD  Proximal-mid diffuse tubular 80% stenosis, there is mid-distal bidirectional flow noted. Distal vessel is visualized on LIMA angiogram, this shows 60% distal stenosis.     D1 is a large vessel with tortuosity noted and ostial/proximal 70% stenosis followed by mid vessel calcification with 50% stenosis and mid-distal 99% stenosis.          LCX Medium to large size vessel, proximal 30 to 40% stenosis.     OM 3 appears to be the largest OM and is 100% proximal-mid , distal vessel is seen through left to left collaterals and appears to have less than 10% stenosis.       RCA Dominant, tortuous, proximal-mid 60 to 70% stenosis.     There are moderate right to left collaterals to the circumflex.            BYPASS GRAFTS     LIMA-LAD Widely patent with less than 10% [id number] stenosis, the distal LAD has stenosis as noted above.         SVG-D1  100% proximal/mid-distal          SVG-circumflex  100% [id number]             Findings and plans as noted below were discussed with the patient and family, they understand/agree        CONCLUSIONS:      2 out of 3 bypass grafts are occluded, the LIMA to LAD graft is patent  We will consult with CT surgery regarding options for revascularization which potentially may include redo CABG versus high risk PCI of the left main into diagonal  Will order CTA abd/bilat LE w/ runoff, in case cardiac support devices are needed      All labs and testing reviewed. CARDIOLOGY LABS:   CBC: No results for input(s): WBC, HGB, HCT, PLT in the last 72 hours. BMP: No results for input(s): NA, K, CO2, BUN, CREATININE, LABGLOM, GLUCOSE in the last 72 hours. PT/INR: No results for input(s): PROTIME, INR in the last 72 hours. APTT:No results for input(s): APTT in the last 72 hours. FASTING LIPID PANEL:  Lab Results   Component Value Date    HDL 34 11/11/2021    LDLCALC 23 11/11/2021    TRIG 126 11/11/2021     LIVER PROFILE:No results for input(s): AST, ALT, ALB in the last 72 hours.     IMPRESSION:      Assessment:   Sick sinus syndrome: stable              - Mobitz I and CHB noted on ILR interrogation 8/4/2021              -s/p dual chamber pacemaker implant on 8/26/2021 ([employer])               -device per HPI  S/P loop recorder implant 7/23/2021 with explant 8/26/2021  Chronic combined CHF: compensated  Ischemic cardiomyopathy: ongoing              -EF 45-50% 3/2021, previously 55-60% 9/2020  CAD:              -s/p CABG x 3 9/2020              -s/p LHC that showed patent LIMA-LAD and occluded vein grafts 4/14/2021 -s/p rotational arthrectomy and PCI with ISREAL to D1 and LM/LAD 4/19/2021  HTN: controlled   HLD  OANH: CPAP compliant  COPD: wears O2 at home PRN   Diverticulitis   History of tobacco abuse   Obesity: diet, exercise, and weight loss is recommended  Chronic respiratory failure  Anxiety   Depression      Plan:   Continue ASA, statin, Plavix, losartan, Toprol and Lasix  Remote device transmissions every three months   Continue with cardiac rehab   Update echocardiogram given ongoing fatigue and shortness of breath   Follow up with Dr. Porter Alcantar as scheduled   Follow up in 6 months      Patient was seen outside of global device window for HTN    MDM moderate    [address] [facility], APRN-CNP  [facility]  [phone]

## 2021-11-15 NOTE — LETTER
[facility]   Electrophysiology Outpatient Note              Date:  November 15, 2021  Patient name: Renny Ortiz  YOB: 1960    Primary Care physician: ANICETO Pino CNP    HISTORY OF PRESENT ILLNESS: The patient is a 63 y.o.  male with a history of SSS, CAD, ICM, CHF, HTN, HLD, OANH, COPD, diverticulitis, obesity, anxiety and chronic respiratory failure. In 9/2020, he was admitted with chest pain. Stress testing was abnormal. He had intermittent 2:1 AV conduction and CHB. LHC showed MVD and he underwent CABG. In 4/2021, he was admitted with chest pain. Stress testing was abnormal. LHC showed 2 out of 3 bypass grafts were occluded. He underwent high risk PCI. While hospitalized, he was noted to have PVC's and intermittent nocturnal CHB and one instance of daytime Mobitz I. He wore a monitor at discharge that showed predominately SR with an average HR of 71 (). PAC burden 0.63%, PVC burden 0.01%, longest pause lasting 3.5 seconds. On 7/23/2021, he had a loop recorded implanted. Device interrogation showed numerous bradycardia/pause events as well as Mobitz I and CHB. On 8/26/2021, he underwent dual chamber pacemaker implant and loop explant. On 9/2/2021, device check showed AP 3.6%,  9.1% and no arrhythmias. Today he is being seen for SSS and HTN. Patient complains of ongoing fatigue and depression. He also reports pain across chest with any activity. Describes as a muscle strain. Pain resolves with rest. He has no taken nitro for this. He also complains of migraines, dizziness and ongoing SOB. He has been wearing his CPAP for sleep (at night and during naps). He is scheduled to see a psychiatrist tomorrow given his ongoing depression.      Device check today shows:   Brand: [employer]  Mode: DDD  Normal function   4.1% AP  13.9%     Arrhythmias: NSVT x 2 (longest 2 seconds)  Battery life 14.4 years  RA impedance 380 ohms   RV impedance 551 ohms RA threshold 0.500 V @ 0.40 ms  RV threshold 0.625 V @ 0.40 ms  RA sensitivity 0.30 mV  RV sensitivity 0.90 mV      Past Medical History:   has a past medical history of Acute diverticulitis, Acute kidney injury ([facility] [address] Acute on chronic respiratory failure with hypoxemia ([facility] [address] Acute respiratory failure ([facility] [address] Acute respiratory failure with hypoxia (HCC), Anxiety, Aortic valve calcification, CAD in native artery, Chronic obstructive pulmonary disease (HCC), Chronic systolic congestive heart failure (HCC), Class 3 severe obesity with body mass index (BMI) of 45.0 to 49.9 in [facility]), Coronary artery calcification, Coronary artery disease involving native heart with angina pectoris ([facility] [address] Crush injury of right foot, DDD (degenerative disc disease), lumbar, Depression, Diverticulitis of colon, Erectile dysfunction, Fatigue, HNP (herniated nucleus pulposus), lumbar, Hyperglycemia, Hyperlipidemia, Hypersomnia, Hypertension, Insomnia, Ischemic cardiomyopathy, Kidney stone, Lumbar radiculopathy, Lumbar spine pain, LVH (left ventricular hypertrophy), Mobitz type I Wenckebach atrioventricular block, Moderate protein-calorie malnutrition ([facility] [address] Observed sleep apnea, OANH (obstructive sleep apnea), Pneumonia, primary atypical, Reactive depression, S/P three vessel coronary artery bypass, Spondylosis without myelopathy or radiculopathy, lumbar region, Tobacco abuse, Tobacco use, and Wears dentures. Past Surgical History:   has a past surgical history that includes Cholecystectomy; Cystoscopy (03/16/2011); Pain management procedure (Right, 12/24/2019); Pain management procedure (Right, 1/7/2020); Coronary artery bypass graft (N/A, 9/25/2020); Finger amputation (Right, 2/2/2021); Coronary angioplasty with stent (03/2021); and Ankle surgery (Left, 8/2/2021). Home Medications:    Prior to Admission medications    Medication Sig Start Date End Date Taking?  Authorizing Provider   benzonatate (TESSALON) 200 MG capsule TAKE 1 CAPSULE BY MOUTH THREE TIMES A DAY AS NEEDED FOR COUGH 11/15/21  Yes Daphine Loop, ANICETO - CNP   albuterol (PROVENTIL) (2.5 MG/3ML) 0.083% nebulizer solution Take 3 mLs by nebulization every 6 hours as needed for Wheezing or Shortness of Breath 9/9/21  Yes Opal Mccord MD   OXYGEN Inhale 2 L into the lungs as needed    Yes Historical Provider, MD   meclizine (ANTIVERT) 25 MG tablet Take 1 tablet by mouth 3 times daily as needed for Dizziness 5/5/21  Yes Daphine Loop, ANICETO - CNP   nitroGLYCERIN (NITROSTAT) 0.4 MG SL tablet Place 1 tablet under the tongue every 5 minutes as needed for Chest pain 4/29/21  Yes Justyn Ramesh MD   furosemide (LASIX) 80 MG tablet Take 0.5 tablets by mouth daily 4/20/21 4/20/22 Yes ANICETO Singletary CNP   clopidogrel (PLAVIX) 75 MG tablet Take 1 tablet by mouth daily 1/13/21  Yes Justyn Ramesh MD   atorvastatin (LIPITOR) 80 MG tablet Take 1 tablet by mouth nightly  Patient taking differently: Take 40 mg by mouth nightly  10/28/20  Yes Justyn Ramesh MD   aspirin 81 MG EC tablet Take 1 tablet by mouth daily 9/30/20  Yes ANICETO Floyd CNP   albuterol sulfate HFA (PROVENTIL HFA) 108 (90 Base) MCG/ACT inhaler Inhale 2 puffs into the lungs every 6 hours as needed for Wheezing 5/17/19  Yes Akosua Denny MD   traZODone (DESYREL) 100 MG tablet Take 1 tablet by mouth 2 times daily Am pm  Patient not taking: Reported on 11/15/2021 11/15/21   Daphine Loop, ANICETO - CNP   DULoxetine (CYMBALTA) 60 MG extended release capsule Take 1 capsule by mouth daily 11/11/21   Daphine Loop, ANICETO - ROXANA   fluticasone-umeclidin-vilant (TRELEGY ELLIPTA) 100-62.5-25 MCG/INH AEPB Inhale 1 puff into the lungs daily 10/21/21   Opal Mccord MD   metoprolol succinate (TOPROL XL) 25 MG extended release tablet Take 1 tablet by mouth daily  Patient not taking: Reported on 11/15/2021 4/20/21   ANICETO Singletary - CNP       Allergies:  Dilaudid [hydromorphone hcl] and Codeine    Social History: reports that he quit smoking about 20 months ago. His smoking use included cigarettes. He has a 70.00 pack-year smoking history. He has never used smokeless tobacco. He reports previous drug use. Drugs: Marijuana (Weed) and Cocaine. He reports that he does not drink alcohol. Family History: family history includes Heart Disease in his mother; High Blood Pressure in his sister; No Known Problems in his sister; Other in his mother. All 14 point review of systems are completed and pertinent positives are mentioned in the history of present illness. Other systems are reviewed and are negative. PHYSICAL EXAM:    Vital signs:    /70   Pulse 90   Ht 5' 4\" (1.626 m)   Wt 294 lb 8 oz (133.6 kg)   SpO2 98%   BMI 50.55 kg/m²      Constitutional and general appearance: alert, cooperative, no distress and appears stated age  HEENT: PERRL, no cervical lymphadenopathy. No masses palpable. Normal oral mucosa  Respiratory:  · Normal excursion and expansion without use of accessory muscles  · Resp auscultation: Normal breath sounds without wheezing, rhonchi, and rales  Cardiovascular:  · The apical impulse is not displaced  · Heart tones are crisp and normal. regular S1 and S2.  · Jugular venous pulsation Normal  · The carotid upstroke is normal in amplitude and contour without delay or bruit  · Peripheral pulses are symmetrical and full   Abdomen:  · No masses or tenderness  · Bowel sounds present  Extremities:  ·  No cyanosis or clubbing  ·  1+ lower extremity edema  ·  Skin: warm and dry  Neurological:  · Alert and oriented  · Moves all extremities well  · No abnormalities of mood, affect, memory, mentation, or behavior are noted    DATA:    Echo 3/2021:      Conclusions      Summary   Limited echo for left ventricle systolic function. Definity is used for   myocardial border enhancement. LV systolic function is mildly reduced with a visually estimated EF=45-50%.    The left ventricle is normal in size with normal wall thickness. More prominent Inferior HK on certain views. Indeterminate diastolic function. Stress 4/2021:   Summary    Large area, reversible, moderate to severe intensity perfusion defect    involving the basal to apical anterolateral, mid to apical anterior and    apex. Mixed ischemia/scar of the inferolateral territory. Decreased wall    motion and myocardial thickening of the above segments. Post-stress LVEF    visually estimated 45-50%. Overall findings represent a high risk scan. Cath 4/19/2021:     [facility] 4/19/21   Left heart catheterization     LVEDP  5      See diagnostic catheterization report for full details, would add that there is severe tortuosity and eccentric plaque within the left main, LAD and D1 which proximal to mid was 75% and distally was 99%.       CONCLUSIONS:      Successful rotational atherectomy and PCI of D1 with single drug-eluting stent  Successful rotational atherectomy and PCI of left main/LAD with single drug-eluting stent  Medical therapy for LCx , this lesion does not appear to be amenable for PCI      Cath 4/2021:  FINDINGS      LVGRAM     LVEDP  6   GRADIENT ACROSS AORTIC VALVE  none   LV FUNCTION EF 60%   WALL MOTION  normal   MITRAL REGURGITATION  mild         CORONARY ARTERIES     LM Proximal less than 10% stenosis, middistal haziness with calcification and 70% eccentric stenosis.       LAD  Proximalmid diffuse tubular 80% stenosis, there is middistal bidirectional flow noted. Distal vessel is visualized on LIMA angiogram, this shows 60% distal stenosis.     D1 is a large vessel with tortuosity noted and ostial/proximal 70% stenosis followed by mid vessel calcification with 50% stenosis and middistal 99% stenosis.          LCX Medium to large size vessel, proximal 30 to 40% stenosis.     OM 3 appears to be the largest OM and is 100% proximalmid , distal vessel is seen through left to left collaterals and appears to have less than 10% -s/p rotational arthrectomy and PCI with ISREAL to D1 and LM/LAD 4/19/2021  HTN: controlled   HLD  OANH: CPAP compliant  COPD: wears O2 at home PRN   Diverticulitis   History of tobacco abuse   Obesity: diet, exercise, and weight loss is recommended  Chronic respiratory failure  Anxiety   Depression      Plan:   Continue ASA, statin, Plavix, losartan, Toprol and Lasix  Remote device transmissions every three months   Continue with cardiac rehab   Update echocardiogram given ongoing fatigue and shortness of breath   Follow up with Dr. Diego Pond as scheduled   Follow up in 6 months      Patient was seen outside of global device window for HTN    MDM moderate    [address] [facility], APRN-CNP  [address]  [phone]

## 2021-11-15 NOTE — PATIENT INSTRUCTIONS
Echocardiogram before visit with Dr. Yanira Hedrick     Call [phone] to schedule    Okay to take Tylenol for musculoskeletal pain     If chest pain is persistent and does not resolve with rest, will recommend going to ED     Remote device transmissions every three months     Follow up with Dr. Yanira Hedrick as scheduled     Follow up with me in 6 months

## 2021-11-16 ENCOUNTER — INITIAL CONSULT (OUTPATIENT)
Dept: SURGERY | Age: 61
End: 2021-11-16
Payer: COMMERCIAL

## 2021-11-16 VITALS
SYSTOLIC BLOOD PRESSURE: 129 MMHG | WEIGHT: 296.3 LBS | HEART RATE: 82 BPM | BODY MASS INDEX: 50.59 KG/M2 | TEMPERATURE: 97.9 F | DIASTOLIC BLOOD PRESSURE: 75 MMHG | HEIGHT: 64 IN

## 2021-11-16 DIAGNOSIS — R10.31 RIGHT GROIN PAIN: Primary | ICD-10-CM

## 2021-11-16 PROCEDURE — G8482 FLU IMMUNIZE ORDER/ADMIN: HCPCS | Performed by: SURGERY

## 2021-11-16 PROCEDURE — 99243 OFF/OP CNSLTJ NEW/EST LOW 30: CPT | Performed by: SURGERY

## 2021-11-16 PROCEDURE — G8427 DOCREV CUR MEDS BY ELIG CLIN: HCPCS | Performed by: SURGERY

## 2021-11-16 PROCEDURE — G8417 CALC BMI ABV UP PARAM F/U: HCPCS | Performed by: SURGERY

## 2021-11-16 NOTE — PROGRESS NOTES
New Patient Via Pedrito Marino MD    [address], [address] [facility]  [place], [facility]  [phone]    Radha Paniagua   YOB: 1960    Date of Visit:  11/16/2021    ANICETO Cosby - CNP    Chief Complaint: Right groin pain    HPI: Patient presents for evaluation of right groin pain. He states this has been going on for a while. It is uncomfortable if he moves or tries to get up into his truck. He states it feels like it is pulling from his bellybutton down. He denies any nausea or vomiting. His bowels have been working normally.   He does not notice a bulge    Allergies   Allergen Reactions    Dilaudid [Hydromorphone Hcl] Nausea And Vomiting    Codeine Itching     Outpatient Medications Marked as Taking for the 11/16/21 encounter (Initial consult) with Dorothy Bob MD   Medication Sig Dispense Refill    benzonatate (TESSALON) 200 MG capsule TAKE 1 CAPSULE BY MOUTH THREE TIMES A DAY AS NEEDED FOR COUGH 30 capsule 1    DULoxetine (CYMBALTA) 60 MG extended release capsule Take 1 capsule by mouth daily 30 capsule 1    fluticasone-umeclidin-vilant (TRELEGY ELLIPTA) 100-62.5-25 MCG/INH AEPB Inhale 1 puff into the lungs daily 60 each 5    albuterol (PROVENTIL) (2.5 MG/3ML) 0.083% nebulizer solution Take 3 mLs by nebulization every 6 hours as needed for Wheezing or Shortness of Breath 360 each 3    OXYGEN Inhale 2 L into the lungs as needed       meclizine (ANTIVERT) 25 MG tablet Take 1 tablet by mouth 3 times daily as needed for Dizziness 30 tablet 0    nitroGLYCERIN (NITROSTAT) 0.4 MG SL tablet Place 1 tablet under the tongue every 5 minutes as needed for Chest pain 25 tablet 3    furosemide (LASIX) 80 MG tablet Take 0.5 tablets by mouth daily 45 tablet 3    clopidogrel (PLAVIX) 75 MG tablet Take 1 tablet by mouth daily 90 tablet 3    atorvastatin (LIPITOR) 80 MG tablet Take 1 tablet by mouth nightly (Patient taking differently: Take 40 mg by mouth nightly ) 90 tablet 3    aspirin 81 MG EC tablet Take 1 tablet by mouth daily 30 tablet 0    albuterol sulfate HFA (PROVENTIL HFA) 108 (90 Base) MCG/ACT inhaler Inhale 2 puffs into the lungs every 6 hours as needed for Wheezing 1 Inhaler 0       Past Medical History:   Diagnosis Date    Acute diverticulitis 04/10/2021    Acute kidney injury ([address] 04/08/2021    Acute on chronic respiratory failure with hypoxemia (HCC)     Acute respiratory failure ([address] 08/19/2020    Acute respiratory failure with hypoxia ([address]     Anxiety 01/06/2020    Aortic valve calcification 09/2020    seen on lung CT    CAD in native artery 04/14/2021    Chronic obstructive pulmonary disease ([address] 09/03/2019    PFT done 9/03/19    Chronic systolic congestive heart failure ([address] 04/10/2021    Class 3 severe obesity with body mass index (BMI) of 45.0 to 49.9 in adult [facility])     Coronary artery calcification 09/2020    seen on lung ct    Coronary artery disease involving native heart with angina pectoris ([address] 09/22/2020    Crush injury of right foot 07/23/2015    DDD (degenerative disc disease), lumbar 01/06/2020    Depression 01/06/2020    Diverticulitis of colon 04/10/2021    Erectile dysfunction 01/06/2020    Fatigue 01/06/2020    HNP (herniated nucleus pulposus), lumbar 01/06/2020    Hyperglycemia 01/06/2020    Hyperlipidemia     Hypersomnia 01/06/2020    Hypertension     Insomnia     Ischemic cardiomyopathy     Kidney stone     Lumbar radiculopathy 01/06/2020    Lumbar spine pain 01/06/2020    LVH (left ventricular hypertrophy)     seen on echo 8/2020, moderate    Mobitz type I Wenckebach atrioventricular block 04/10/2021    Moderate protein-calorie malnutrition ([address] 03/17/2020    Observed sleep apnea 01/06/2020    OANH (obstructive sleep apnea) 01/06/2020    no C-pap is getting tested    Pneumonia, primary atypical     Reactive depression 01/06/2020    S/P three vessel coronary artery bypass 10/26/2020    Spondylosis without myelopathy or radiculopathy, lumbar region 01/06/2020    Tobacco abuse 01/06/2020    Tobacco use 01/06/2020    Wears dentures     full set     Past Surgical History:   Procedure Laterality Date    ANKLE SURGERY Left 8/2/2021    LEFT FOOT SCREW REMOVAL performed by Td Sutton MD at [address]  03/2021    CORONARY ARTERY BYPASS GRAFT N/A 9/25/2020    CORONARY ARTERY BYPASS GRAFTING X3, INTERNAL MAMMARY ARTERY, SAPHENOUS VEIN GRAFT, ON PUMP, STERNAL PLATING, 5 LEVEL BILATERAL INTERCOSTAL NERVE BLOCK, PLATELET GEL APPLICATION performed by Garry Jeans, MD at [address] [facility]  03/16/2011    cystoscopy left ureteroscopy, left retrograde, double J stent placement    FINGER AMPUTATION Right 2/2/2021    RIGHT MIDDLE FINGER IRRIGATION AND DEBRIDEMENT WITH REVISION AMPUTATION AND BONE SHORTENING, POSSIBLE NAIL ABLATION performed by Samm Flannery MD at [address] [facility] Right 12/24/2019    RIGHT LUMBAR FIVE SACRAL ONE TRANSFORAMINAL EPIDURAL STEROID INJECTION SITE CONFIRMED BY FLUOROSCOPY performed by Zackary Manzo MD at [address] [facility] Right 1/7/2020    RIGHT LUMBAR FOUR AND LUMBAR FIVE TRANSFORAMINAL EPIDURAL STEROID INJECTION SITE CONFIRMED BY FLUOROSCOPY performed by Zackary Manzo MD at [place] [id number]     Family History   Problem Relation Age of Onset    Heart Disease Mother     Other Mother         copd    High Blood Pressure Sister     No Known Problems Sister      Social History     Socioeconomic History    Marital status:      Spouse name: Not on file    Number of children: Not on file    Years of education: Not on file    Highest education level: Not on file   Occupational History    Not on file   Tobacco Use    Smoking status: Former Smoker     Packs/day: 2.00     Years: 35.00 Pack years: 70.00     Types: Cigarettes     Quit date: 3/1/2020     Years since quittin.7    Smokeless tobacco: Never Used   Vaping Use    Vaping Use: Never used   Substance and Sexual Activity    Alcohol use: No    Drug use: Not Currently     Types: Marijuana Meghna Kugel), Cocaine     Comment: hx of drug use 30 yrs ago    Sexual activity: Not Currently   Other Topics Concern    Not on file   Social History Narrative    Not on file     Social Determinants of Health     Financial Resource Strain: Low Risk     Difficulty of Paying Living Expenses: Not hard at all   Food Insecurity: No Food Insecurity    Worried About Running Out of Food in the Last Year: Never true    Theresa of Food in the Last Year: Never true   Transportation Needs:     Lack of Transportation (Medical): Not on file    Lack of Transportation (Non-Medical):  Not on file   Physical Activity:     Days of Exercise per Week: Not on file    Minutes of Exercise per Session: Not on file   Stress:     Feeling of Stress : Not on file   Social Connections:     Frequency of Communication with Friends and Family: Not on file    Frequency of Social Gatherings with Friends and Family: Not on file    Attends Mu-ism Services: Not on file    Active Member of [employer] or Organizations: Not on file    Attends Club or Organization Meetings: Not on file    Marital Status: Not on file   Intimate Partner Violence:     Fear of Current or Ex-Partner: Not on file    Emotionally Abused: Not on file    Physically Abused: Not on file    Sexually Abused: Not on file   Housing Stability:     Unable to Pay for Housing in the Last Year: Not on file    Number of [place] in the Last Year: Not on file    Unstable Housing in the Last Year: Not on file          Vitals:    21 0932   BP: 129/75   Site: Left Wrist   Position: Sitting   Cuff Size: Large Adult   Pulse: 82   Temp: 97.9 °F (36.6 °C)   TempSrc: Temporal   Weight: 296 lb 4.8 oz (134.4 kg) Height: 5' 4\" (1.626 m)     Body mass index is 50.86 kg/m². Wt Readings from Last 3 Encounters:   11/16/21 296 lb 4.8 oz (134.4 kg)   11/15/21 294 lb 8 oz (133.6 kg)   11/11/21 295 lb (133.8 kg)     BP Readings from Last 3 Encounters:   11/16/21 129/75   11/15/21 120/70   11/11/21 110/62        REVIEW OF SYSTEMS:  CONSTITUTIONAL:  negative  HEENT:  Negative  RESPIRATORY:  negative  CARDIOVASCULAR:  negative  GASTROINTESTINAL:  positive for groin pain  GENITOURINARY:  negative  HEMATOLOGIC/LYMPHATIC:  negative  ENDOCRINE:  Negative  NEUROLOGICAL:  Negative  * All other ROS reviewed and negative. PE:  Constitutional:  Well developed, well nourished, no acute distress, non-toxic appearance   Eyes:  PERRL, conjunctiva normal   HENT:  Atraumatic, external ears normal, nose normal. Neck- normal range of motion, no tenderness, supple   Respiratory:  No respiratory distress, normal breath sounds, no rales, no wheezing   Cardiovascular:  Normal rate, normal rhythm  GI: Bowel sounds positive, soft, nontender. He is morbidly obese. In the right inguinal area he has some mild tenderness. Exam is limited by morbid obesity, however, I do not feel any obvious inguinal hernia  :  No costovertebral angle tenderness   Integument:  Well hydrated, no rash   Lymphatic:  No lymphadenopathy noted   Neurologic:  Alert & oriented x 3, no focal deficits noted   Psychiatric:  Speech and behavior appropriate       DATA:  Radiology Review: CT images reviewed from April with no sign of abdominal wall hernia      Assessment:  1. Right groin pain        Plan: I suspect he just has some groin strain. There is no sign of a hernia on exam or imaging. I encouraged him to rest this area and apply ice and use anti-inflammatories as needed. No surgical intervention needed at this time.   He will follow-up if he has any further problems

## 2021-11-18 DIAGNOSIS — E55.9 VITAMIN D DEFICIENCY: Primary | ICD-10-CM

## 2021-11-18 RX ORDER — CHOLECALCIFEROL (VITAMIN D3) 1250 MCG
CAPSULE ORAL
Qty: 4 CAPSULE | Refills: 5 | Status: SHIPPED | OUTPATIENT
Start: 2021-11-18 | End: 2022-04-24 | Stop reason: SDUPTHER

## 2021-11-18 RX ORDER — CHOLECALCIFEROL (VITAMIN D3) 1250 MCG
CAPSULE ORAL
COMMUNITY
End: 2021-11-18 | Stop reason: SDUPTHER

## 2021-11-19 ENCOUNTER — APPOINTMENT (OUTPATIENT)
Dept: CARDIAC REHAB | Age: 61
End: 2021-11-19
Payer: COMMERCIAL

## 2021-11-19 ASSESSMENT — ENCOUNTER SYMPTOMS
ANAL BLEEDING: 0
COUGH: 1
ALLERGIC/IMMUNOLOGIC NEGATIVE: 1
BLOOD IN STOOL: 0
EYES NEGATIVE: 1
NAUSEA: 0
ABDOMINAL PAIN: 1
SHORTNESS OF BREATH: 0

## 2021-11-22 ENCOUNTER — APPOINTMENT (OUTPATIENT)
Dept: CARDIAC REHAB | Age: 61
End: 2021-11-22
Payer: COMMERCIAL

## 2021-11-24 ENCOUNTER — APPOINTMENT (OUTPATIENT)
Dept: CARDIAC REHAB | Age: 61
End: 2021-11-24
Payer: COMMERCIAL

## 2021-11-26 ENCOUNTER — APPOINTMENT (OUTPATIENT)
Dept: CARDIAC REHAB | Age: 61
End: 2021-11-26
Payer: COMMERCIAL

## 2021-11-29 ENCOUNTER — APPOINTMENT (OUTPATIENT)
Dept: CARDIAC REHAB | Age: 61
End: 2021-11-29
Payer: COMMERCIAL

## 2021-12-01 ENCOUNTER — APPOINTMENT (OUTPATIENT)
Dept: CARDIAC REHAB | Age: 61
End: 2021-12-01
Payer: COMMERCIAL

## 2021-12-01 ENCOUNTER — HOSPITAL ENCOUNTER (OUTPATIENT)
Dept: NON INVASIVE DIAGNOSTICS | Age: 61
Discharge: HOME | End: 2021-12-01
Payer: COMMERCIAL

## 2021-12-01 ENCOUNTER — HOSPITAL ENCOUNTER (OUTPATIENT)
Dept: NUCLEAR MEDICINE | Age: 61
Discharge: HOME | End: 2021-12-01
Payer: COMMERCIAL

## 2021-12-01 DIAGNOSIS — R06.09 DYSPNEA ON EXERTION: ICD-10-CM

## 2021-12-01 LAB
LV EF: 55 %
LV EF: 60 %
LVEF MODALITY: NORMAL
LVEF MODALITY: NORMAL

## 2021-12-01 PROCEDURE — A9502 TC99M TETROFOSMIN: HCPCS | Performed by: NURSE PRACTITIONER

## 2021-12-01 PROCEDURE — 6360000004 HC RX CONTRAST MEDICATION: Performed by: INTERNAL MEDICINE

## 2021-12-01 PROCEDURE — 78452 HT MUSCLE IMAGE SPECT MULT: CPT

## 2021-12-01 PROCEDURE — 6360000002 HC RX W HCPCS: Performed by: NURSE PRACTITIONER

## 2021-12-01 PROCEDURE — C8929 TTE W OR WO FOL WCON,DOPPLER: HCPCS

## 2021-12-01 PROCEDURE — 93017 CV STRESS TEST TRACING ONLY: CPT

## 2021-12-01 PROCEDURE — 3430000000 HC RX DIAGNOSTIC RADIOPHARMACEUTICAL: Performed by: NURSE PRACTITIONER

## 2021-12-01 RX ADMIN — TETROFOSMIN 33.5 MILLICURIE: 1.38 INJECTION, POWDER, LYOPHILIZED, FOR SOLUTION INTRAVENOUS at 08:25

## 2021-12-01 RX ADMIN — PERFLUTREN 2.2 MG: 6.52 INJECTION, SUSPENSION INTRAVENOUS at 08:29

## 2021-12-01 RX ADMIN — REGADENOSON 0.4 MG: 0.08 INJECTION, SOLUTION INTRAVENOUS at 08:25

## 2021-12-01 NOTE — RESULT ENCOUNTER NOTE
Please let Mr. Jocelyne White know his echo is good. His ejection fraction has actually improved and is WNL. Thanks.

## 2021-12-02 ENCOUNTER — HOSPITAL ENCOUNTER (OUTPATIENT)
Dept: NUCLEAR MEDICINE | Age: 61
Discharge: HOME | End: 2021-12-02
Payer: COMMERCIAL

## 2021-12-02 ENCOUNTER — TELEPHONE (OUTPATIENT)
Dept: CARDIOLOGY CLINIC | Age: 61
End: 2021-12-02

## 2021-12-02 DIAGNOSIS — Z95.0 PACEMAKER: ICD-10-CM

## 2021-12-02 PROCEDURE — A9502 TC99M TETROFOSMIN: HCPCS | Performed by: NURSE PRACTITIONER

## 2021-12-02 PROCEDURE — 3430000000 HC RX DIAGNOSTIC RADIOPHARMACEUTICAL: Performed by: NURSE PRACTITIONER

## 2021-12-02 RX ADMIN — TETROFOSMIN 32.7 MILLICURIE: 1.38 INJECTION, POWDER, LYOPHILIZED, FOR SOLUTION INTRAVENOUS at 08:20

## 2021-12-02 NOTE — TELEPHONE ENCOUNTER
Informed patient per NPAM: ok to drive. NPAM will call him with the plan moving forward after discussing with Dr álvarez. Phone number to return call is [phone]. OK to leave VM if needed.

## 2021-12-02 NOTE — TELEPHONE ENCOUNTER
Faisal Lopez,     I saw Mr. Ronaldo Costello recently in clinic and he complained of ongoing chest pain and shortness of breath. His EF has improved. If you wouldn't mind reviewing stress test and letting me know how you'd like me to proceed I would appreciate it.      Thanks, Irina

## 2021-12-02 NOTE — TELEPHONE ENCOUNTER
Patient calling wanting stress results. He is going out of town tomorrow at [employer] and is concerned with whether or not he can drive. Please advise.

## 2021-12-03 ENCOUNTER — APPOINTMENT (OUTPATIENT)
Dept: CARDIAC REHAB | Age: 61
End: 2021-12-03
Payer: COMMERCIAL

## 2021-12-03 NOTE — TELEPHONE ENCOUNTER
I touch base with Mr. Janice Dillon that was by phone yesterday and today. Noted cardiac complaints. Feels his heart is fluttering, chest pain more recently. Just not feeling well in general.  Notes his heart rate this morning is 119 bpm with blood pressure 94 systolic. No dizziness or presyncope. He had stopped his metoprolol entirely previously due to low blood pressures which I think was not known to us. Recent interrogation 11/15 shows no arrhythmia-cannot exclude atrial arrhythmia at this time however. Not known to have atrial fibrillation/arrhythmia by device interrogations with previous loop monitor. I did advise that the patient's symptoms worsen that he present to emergency department locally, currently out of town in [facility]. He will return Sunday night. Advised that we get together with EP/Irina and have a remote interrogation sent Monday. Irina MALHOTRA, If you could directly see me that would be helpful. Spoke with Dr. Luciana Interiano and reviewed films and stress test.  Inferolateral wall reversible defect. Anterolateral wall perfusion normalize status post prior cath. Interventional recommends repeat diagnostic catheterization to reevaluate diagonal that was previously stented in April as well as reassess circumflex anatomy to see if any opportunity for  in the future. The patient would like this done soon as possible. Jennifer, please schedule for LHC with Dr. Luciana Interiano as soon as able.      Abby Gunn MD, [address] [facility]  [phone] [facility]  [phone] [address] [place] [address]

## 2021-12-06 ENCOUNTER — APPOINTMENT (OUTPATIENT)
Dept: CARDIAC REHAB | Age: 61
End: 2021-12-06
Payer: COMMERCIAL

## 2021-12-06 NOTE — TELEPHONE ENCOUNTER
Pt is going to send remote transmission. Provided [employer] phone number. BP was 97/48 . Pt is very fatigued. Pt also wanted to report that he blacked out and fell on Saturday night. Right before he blacked out he got a huge hot flash, got very dizzy, and had CP right before he fell.

## 2021-12-06 NOTE — TELEPHONE ENCOUNTER
Spoke with patient. I relayed msg on transmission. I had to schedule cath out further due to insurance. Patient aware to go to ER if needed. Unsure if this msg should remain open after medications reviewed for procedure. Please advise. Patient is scheduled with Dr. Lucila Sorenson for Left Heart Cath on 12/22/21 at [facility], arrival time of 7:30am to the Cath Lab. Please have patient arrive to the main entrance of [facility] and check in with the registration desk. RN's -Please call patient regarding medication instructions. Remind patient to be NPO after midnight (8 hours prior). Do not apply lotions/creams on skin the day of procedure.

## 2021-12-06 NOTE — TELEPHONE ENCOUNTER
Manual transmission of pacemaker and/or ICD, or implanted heart monitor shows normal cardiac device function. Patient's last device interrogation was on 11/15. Estimated device longevity is 14.4 years. Patient has a history of Mobitz I, asystole, ICM, CHB, sinus bradycardia, and SSS. Takes Plavix and Toprol XL. AP 1.4%   4.2%    Since last device interrogation, 1 NSVT event recorded on 12/6 x 13 beats. SJ PALENCIA to review. Please see the [employer] report under the Cardiology tab for more detail. NPAM/ DXA/ AGK please advise - see patient symptoms below.

## 2021-12-08 ENCOUNTER — APPOINTMENT (OUTPATIENT)
Dept: CARDIAC REHAB | Age: 61
End: 2021-12-08
Payer: COMMERCIAL

## 2021-12-10 ENCOUNTER — APPOINTMENT (OUTPATIENT)
Dept: CARDIAC REHAB | Age: 61
End: 2021-12-10
Payer: COMMERCIAL

## 2021-12-13 ENCOUNTER — APPOINTMENT (OUTPATIENT)
Dept: CARDIAC REHAB | Age: 61
End: 2021-12-13
Payer: COMMERCIAL

## 2021-12-13 NOTE — PROGRESS NOTES
CARDIOLOGY FOLLOW-UP VISIT        Patient Name: Jayde Marcum  Primary Care physician: ANICETO Denise - CNP  Reason for Referral/Chief complaint: complaints of fatigue and dizziness today    Subjective:     Jayde Marcum is a pleasant 63 y.o. man with prior history notable for obstructive sleep apnea noncompliant with CPAP previously, Mobitz I with 2:1, congestive heart failure with ischemic cardiomyopathy, coronary artery disease status post coronary artery bypass with course complicated with vein graft failure x 2, status post PCI LM/D1, and previous smoker. Patient returns today for follow up status post recent intervention. I first evaluated the patient at [facility] 9/25/20 at which time he was treated for unstable angina. Transferred to [facility] for catheterization which revealed multi-vessel coronary artery disease. He underwent three-vessel bypass with Dr. Nimisha Fisher, receiving a LIMA to LAD, SVG to OM and SVG to diagonal. Limited ECHO 9/29/20 confirmed Normal left ventricular systolic function with ejection fraction of 55-60%. At subsequent visits in interim he complained of worsening dyspnea with exertion. Diuretics were up-titrated without relief of symptoms. Repeat echo showed decline in LVEF since bypass. Ischemic evaluation was planned but in interim he was admitted 4/8/-4/2021 due to chest pain. He underwent LHC on 4/14/21 which noted patent LIMA-LAD and occluded vein grafts. On 4/19/21, he underwent rotational atherectomy of the left main, LAD and first diagonal with PCI/ISREAL to D1 as well as LM/LAD. He was readmitted in short order for arm pain at site of prior PICC line. Found to have superficial blood clot in left basilic vein. No anti-coagulation was recommended. Loop/Device check 8/4/2021 showed numerous bradycardia and pause recording. EGMs show Mobitz I/CHB.      Patient underwent medtronic dual PPM implant 8/26/21 by Dr. Angela Skelton for sinus bradycardia. Device check 9/1/2021 did not show arrhythmias, normal function from device. At the conclusion of 8/2021 visit, patient was referred to cardiac rehab. Last OV 9/17/21, patient reported that he has been experiencing mild dizziness. BP low in evening, high in AM. Stable SOB. He reported his edema had improved. Wt is down from prior. He believes he is at his dry weight (284 lbs). 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 Planning to pursue disability. In interim he had an Echo on 12/1/21 which showed an EF 55%. He also had a Stress test which showed a large sized lateral defect consistent with ischemia. Today we discussed upcoming cath that is scheduled for next week. The patient reports that he has extreme shortness of breath and chest pain. Non-exertional. He reports multiple syncopal episodes at home due to dizziness. This occurred twice. First episode occurred in the setting of taking sublingual nitroglycerin for an episode of chest pain. He got up 10 minutes later and subsequently passed out. Second episode he had been seated following getting short of breath while feeding the dog, when he stood up to walk to the front porch he took less than 10 steps and subsequently passed out again. No trauma to his legs. Does not feel overtly dehydrated but states he is urinating a lot with the Lasix. Drinks lots of water. No other recent changes in medications. Has been titrated off ACE/ARB in the past due to hypotension. Notes his blood pressure is very low in the evenings. Home Medications:  Were reviewed and are listed in nursing record and/or below  Prior to Admission medications    Medication Sig Start Date End Date Taking?  Authorizing Provider   Cholecalciferol (VITAMIN D3) 1.25 MG (77503 UT) CAPS 1 po a week 11/18/21  Yes Kelsie Chen APRN - CNP   traZODone (DESYREL) 100 MG tablet Take 1 tablet by mouth 2 times daily Am pm 11/15/21  Yes ANICETO Deng CNP   benzonatate (TESSALON) 200 MG capsule TAKE 1 CAPSULE BY MOUTH THREE TIMES A DAY AS NEEDED FOR COUGH 11/15/21  Yes ANICETO Deng CNP   DULoxetine (CYMBALTA) 60 MG extended release capsule Take 1 capsule by mouth daily 11/11/21  Yes ANICETO Deng CNP   fluticasone-umeclidin-vilant (TRELEGY ELLIPTA) 100-62.5-25 MCG/INH AEPB Inhale 1 puff into the lungs daily 10/21/21  Yes Mehreen Mcneill MD   albuterol (PROVENTIL) (2.5 MG/3ML) 0.083% nebulizer solution Take 3 mLs by nebulization every 6 hours as needed for Wheezing or Shortness of Breath 9/9/21  Yes Mehreen Mcneill MD   OXYGEN Inhale 2 L into the lungs as needed    Yes Historical Provider, MD   meclizine (ANTIVERT) 25 MG tablet Take 1 tablet by mouth 3 times daily as needed for Dizziness 5/5/21  Yes ANICETO Deng CNP   nitroGLYCERIN (NITROSTAT) 0.4 MG SL tablet Place 1 tablet under the tongue every 5 minutes as needed for Chest pain 4/29/21  Yes Helena Pelaez MD   furosemide (LASIX) 80 MG tablet Take 0.5 tablets by mouth daily 4/20/21 4/20/22 Yes ANICETO Pickett CNP   clopidogrel (PLAVIX) 75 MG tablet Take 1 tablet by mouth daily 1/13/21  Yes Helena Pelaez MD   atorvastatin (LIPITOR) 80 MG tablet Take 1 tablet by mouth nightly 10/28/20  Yes Helena Pelaez MD   aspirin 81 MG EC tablet Take 1 tablet by mouth daily 9/30/20  Yes ANICETO Davison CNP   albuterol sulfate HFA (PROVENTIL HFA) 108 (90 Base) MCG/ACT inhaler Inhale 2 puffs into the lungs every 6 hours as needed for Wheezing 5/17/19  Yes Lake Orozco MD   metoprolol succinate (TOPROL XL) 25 MG extended release tablet Take 1 tablet by mouth daily  Patient not taking: Reported on 11/16/2021 4/20/21   ANICETO Pickett CNP   Reports he is not taking digoxin, potassium, furosemide or statin presently. He is taking Plavix in addition aspirin.     CURRENT Medications:  No current facility-administered medications for this visit. Allergies:  Dilaudid [hydromorphone hcl] and Codeine     Review of Systems:   A 14 point review of symptoms completed. Pertinent positives identified in the HPI, all other review of symptoms negative. Objective:     Vitals:    12/14/21 0757 12/14/21 0841 12/14/21 0843 12/14/21 0844   BP: 106/66 108/72 92/64 92/66   Position:  Supine Sitting Standing   Pulse: 92 81 86 95   SpO2: 94% 93% 94% 94%   Weight: 299 lb (135.6 kg)      Height: 5' 4\" (1.626 m)               Wt Readings from Last 3 Encounters:   12/14/21 299 lb (135.6 kg)   11/16/21 296 lb 4.8 oz (134.4 kg)   11/15/21 294 lb 8 oz (133.6 kg)       PHYSICAL EXAM:    General:  Alert, cooperative, no distress, appears stated age   Head:  Normocephalic, atraumatic   Eyes:  Conjunctiva/corneas clear, anicteric sclerae    Nose: Nares normal, no drainage or sinus tenderness   Throat: No abnormalities of the lips, oral mucosa or tongue. Moist mucous membranes. Neck: Trachea midline. Neck supple with no lymphadenopathy, thyroid not enlarged, symmetric, no tenderness/mass/nodules, no Jugular venous pressure elevation    Lungs:   Clear to auscultation bilaterally, no wheezes, no rales, no respiratory distress   Chest Wall:  Well-healed sternotomy incision. Well healed PPM site. Heart:  Regular rate and rhythm, normal S1, normal S2, no murmur, no rub, no S3/S4, PMI non-palpable. Abdomen:   Obese, soft, non-tender, with normoactive bowel sounds. No masses, no hepatosplenomegaly   Extremities: No cyanosis, clubbing. No edema. Incision for vein harvest right lower extremity well-healed. Vascular: 2+ radial, dorsalis pedis and posterior tibial pulses bilaterally. Brisk carotid upstrokes without carotid bruit. Skin: Skin color, texture, turgor are normal with no rashes or ulceration. Pysch: Euthymic mood, appropriate affect   Neurologic: Oriented to person, place and time. No slurred speech or facial asymmetry.  No motor or sensory deficits on ventricular hypertrophy. Normal diastolic function with normal LV filling pressure. Mild mitral annular calcification. Aortic valve appears sclerotic but opens adequately. Individual aortic valve leaflets are not clearly visualized. Cannot r/o   bicuspid valve. ECHO 3/12/21  Summary   Limited echo for left ventricle systolic function. Definity is used for   myocardial border enhancement. LV systolic function is mildly reduced with a visually estimated EF=45-50%. The left ventricle is normal in size with normal wall thickness. More prominent Inferior HK on certain views. Indeterminate diastolic function. Limited ECHO 9/29/20   Summary   Limited only f/u for LVEF post op CABG   Technically difficult examination. Normal left ventricular systolic function with ejection fraction of 55-60%. No regional wall motion abnormalites are seen. Compared to previous study from 8- no changes noted in left   ventricular function. Stress test 12/1/21:   Summary  Abnormal moderate risk myocardial perfusion study. There is a large sized lateral defect consistent with ischemia. The left ventricular size and function are normal.  The estimated left ventricular function is 60%. Stress Protocols   Resting ECG  Normal sinus rhythm; left axis; cannot  r/o prior inferior and anterolateral  infarct; low voltage; nonspecific ST  change      [facility] 4/19/21   Left heart catheterization     LVEDP  5      See diagnostic catheterization report for full details, would add that there is severe tortuosity and eccentric plaque within the left main, LAD and D1 which proximal to mid was 75% and distally was 99%.        CONCLUSIONS:      Successful rotational atherectomy and PCI of D1 with single drug-eluting stent  Successful rotational atherectomy and PCI of left main/LAD with single drug-eluting stent  Medical therapy for LCx , this lesion does not appear to be amenable for PCI      [facility] 4/14/2021  LVGRAM     LVEDP 6   GRADIENT ACROSS AORTIC VALVE  none   LV FUNCTION EF 60%   WALL MOTION  normal   MITRAL REGURGITATION  mild         CORONARY ARTERIES     LM Proximal less than 10% stenosis, mid-distal haziness with calcification and 70% eccentric stenosis. LAD  Proximal-mid diffuse tubular 80% stenosis, there is mid-distal bidirectional flow noted. Distal vessel is visualized on LIMA angiogram, this shows 60% distal stenosis. D1 is a large vessel with tortuosity noted and ostial/proximal 70% stenosis followed by mid vessel calcification with 50% stenosis and mid-distal 99% stenosis. LCX Medium to large size vessel, proximal 30 to 40% stenosis. OM 3 appears to be the largest OM and is 100% proximal-mid , distal vessel is seen through left to left collaterals and appears to have less than 10% stenosis. RCA Dominant, tortuous, proximal-mid 60 to 70% stenosis. There are moderate right to left collaterals to the circumflex. BYPASS GRAFTS     LIMA-LAD Widely patent with less than 10% [id number] stenosis, the distal LAD has stenosis as noted above. SVG-D1  100% proximal/mid-distal          SVG-circumflex  100% [id number]             Findings and plans as noted below were discussed with the patient and family, they understand/agree        CONCLUSIONS:      2 out of 3 bypass grafts are occluded, the LIMA to LAD graft is patent  We will consult with CT surgery regarding options for revascularization which potentially may include redo CABG versus high risk PCI of the left main into diagonal  Will order CTA abd/bilat LE w/ runoff, in case cardiac support devices are needed        Cardiac monitor: 05/12/21        Impression and Plan     1. Coronary artery disease manifest with unstable angina status post bypass 9/2020 with occluded SVG's to LCx and D1 noted on repeat cath, status post rotation atherectomy/PCI 4/19/2021 with Dr. Gloria Main.   Endorses chest pain/shortness of breath at rest.  Recently performed stress test shows persistent inferolateral wall defect, may be related to  left circumflex but we are planning cardiac catheterization with Dr. Vinnie Bender 12/22  2. Chronic congestive heart failure with reduced EF, compensated by exam today. Wt is stable by home scale. 3.  History of heart block status post dual-chamber PPM   4. Syncope-do suspect this is due to volume depletion. He had a recent device check that was reassuring. 5.  Obstructive sleep apnea noncompliant with CPAP  6. Obesity with BMI 51  7. Low normal BP. Orthostatics are negative though BP does drop with standing  8. Tobacco abuse, now quit.   9.  Chronic obstructive pulmonary disease, moderate by PFTs    Patient Active Problem List   Diagnosis    Crush injury of right foot    Shortness of breath    Erectile dysfunction    Hyperglycemia    Anxiety    Depression    Tobacco abuse    History of alcohol abuse    Fatigue    OANH (obstructive sleep apnea)    Hypersomnia    Chronic obstructive pulmonary disease (HCC)    Lumbar radiculopathy    HNP (herniated nucleus pulposus), lumbar    Spondylosis without myelopathy or radiculopathy, lumbar region    DDD (degenerative disc disease), lumbar    Lumbar spine pain    Acute respiratory distress    Class 3 severe obesity with body mass index (BMI) of 45.0 to 49.9 in adult ([address]    Pneumonia, primary atypical    Acute respiratory failure with hypoxia (HCC)    Moderate protein-calorie malnutrition (HCC)    Acute respiratory failure (HCC)    Acute on chronic respiratory failure with hypoxemia (HCC)    Acute diastolic congestive heart failure (HCC)    Hyperlipidemia    Insomnia    Morbid obesity with BMI of 45.0-49.9, adult (HCC)    Abnormal cardiovascular stress test    Coronary artery disease involving native heart    S/P three vessel coronary artery bypass    Acute kidney injury ([address]    Chronic combined systolic and diastolic congestive heart failure (HCC)    Mobitz type I Wenckebach atrioventricular block    Asystole (HCC)    Ischemic cardiomyopathy    Diverticulitis of colon    Coronary artery disease involving native coronary artery of native heart with unstable angina pectoris (HCC)    CHB (complete heart block) (HCC)    Sinus bradycardia    Cellulitis    Acute deep vein thrombosis (DVT) of left upper extremity (HCC)    Cellulitis of chest wall    Painful orthopaedic hardware (HCC)    SSS (sick sinus syndrome) (HCC)    Observed sleep apnea       PLAN:  1. Unclear to me if chest pain is true angina. His shortness of breath certainly multifactorial with obesity, COPD, CAD and congestive heart failure. Syncope is concerning though I suspect this is due to dehydration. We will be making his Lasix as needed for weight gain exceeding 300 pounds. Planning cath on 12/22/21 with Dr. Patricio Ronquillo for further evaluation. Recent device check was reassuring  2. Plan to reengage with cardiac rehab following cardiac catheterization as above  3. Continue other cardiac medications as prescribed. Follow up with me in 6 months    Scribe's attestation: This note was scribed in the presence of Dr. Justyn Ramesh M.D. By Lidia Monique RN    The scribes documentation has been prepared under my direction and personally reviewed by me in its entirety. I confirm that the note above accurately reflects all work, treatment, procedures, and medical decision making performed by me. Leon Hedrick MD, personally performed the services described in this documentation as scribed by  Lidia Monique RN in my presence, and it is both accurate and complete to the best of our ability. I will address the patient's cardiac risk factors and adjusted pharmacologic treatment as needed. In addition, I have reinforced the need for patient directed risk factor modification. All questions and concerns were addressed to the patient/family. Alternatives to my treatment were discussed. Thank you for allowing us to participate in the care of Lucius Joya. Please call me with any questions [phone].     Abby Gunn MD, [facility]   Cardiovascular Disease  [facility]  [phone] [facility]  [phone] [address] [place] [address]  12/14/2021 8:35 AM

## 2021-12-14 ENCOUNTER — OFFICE VISIT (OUTPATIENT)
Dept: CARDIOLOGY CLINIC | Age: 61
End: 2021-12-14
Payer: COMMERCIAL

## 2021-12-14 VITALS
WEIGHT: 299 LBS | HEART RATE: 95 BPM | DIASTOLIC BLOOD PRESSURE: 66 MMHG | BODY MASS INDEX: 51.04 KG/M2 | SYSTOLIC BLOOD PRESSURE: 92 MMHG | OXYGEN SATURATION: 94 % | HEIGHT: 64 IN

## 2021-12-14 DIAGNOSIS — R00.1 SINUS BRADYCARDIA: ICD-10-CM

## 2021-12-14 DIAGNOSIS — I25.10 CORONARY ARTERY DISEASE INVOLVING NATIVE CORONARY ARTERY OF NATIVE HEART WITHOUT ANGINA PECTORIS: ICD-10-CM

## 2021-12-14 DIAGNOSIS — G47.30 OBSERVED SLEEP APNEA: ICD-10-CM

## 2021-12-14 DIAGNOSIS — I25.5 ISCHEMIC CARDIOMYOPATHY: ICD-10-CM

## 2021-12-14 DIAGNOSIS — I49.5 SSS (SICK SINUS SYNDROME) (HCC): ICD-10-CM

## 2021-12-14 DIAGNOSIS — E66.01 MORBID OBESITY WITH BMI OF 45.0-49.9, ADULT (HCC): ICD-10-CM

## 2021-12-14 DIAGNOSIS — I50.31 ACUTE DIASTOLIC CONGESTIVE HEART FAILURE (HCC): Primary | ICD-10-CM

## 2021-12-14 DIAGNOSIS — I25.110 CORONARY ARTERY DISEASE INVOLVING NATIVE CORONARY ARTERY OF NATIVE HEART WITH UNSTABLE ANGINA PECTORIS (HCC): ICD-10-CM

## 2021-12-14 DIAGNOSIS — Z95.1 S/P THREE VESSEL CORONARY ARTERY BYPASS: ICD-10-CM

## 2021-12-14 DIAGNOSIS — E78.2 MIXED HYPERLIPIDEMIA: ICD-10-CM

## 2021-12-14 PROCEDURE — G8417 CALC BMI ABV UP PARAM F/U: HCPCS | Performed by: INTERNAL MEDICINE

## 2021-12-14 PROCEDURE — 3017F COLORECTAL CA SCREEN DOC REV: CPT | Performed by: INTERNAL MEDICINE

## 2021-12-14 PROCEDURE — G8482 FLU IMMUNIZE ORDER/ADMIN: HCPCS | Performed by: INTERNAL MEDICINE

## 2021-12-14 PROCEDURE — G8427 DOCREV CUR MEDS BY ELIG CLIN: HCPCS | Performed by: INTERNAL MEDICINE

## 2021-12-14 PROCEDURE — 99214 OFFICE O/P EST MOD 30 MIN: CPT | Performed by: INTERNAL MEDICINE

## 2021-12-14 PROCEDURE — 1036F TOBACCO NON-USER: CPT | Performed by: INTERNAL MEDICINE

## 2021-12-14 RX ORDER — FUROSEMIDE 40 MG/1
40 TABLET ORAL PRN
Qty: 45 TABLET | Refills: 3
Start: 2021-12-14 | End: 2022-01-19 | Stop reason: SDUPTHER

## 2021-12-14 NOTE — PATIENT INSTRUCTIONS
PLAN:  1. Cath on 12/22/21 with Dr. Aleida Dinero  2. Orthostatics checked today. 3. Take lasix as needed for weight greater than 300lbs.     Follow up with me in 6 months

## 2021-12-14 NOTE — LETTER
Fadumo Roberts  1960          CARDIOLOGY FOLLOW-UP VISIT        Patient Name: Fadumo Roberts  Primary Care physician: ANICETO Cooper - CNP  Reason for Referral/Chief complaint: complaints of fatigue and dizziness today    Subjective:     Fadumo Roberts is a pleasant 63 y.o. man with prior history notable for obstructive sleep apnea noncompliant with CPAP previously, Mobitz I with 2:1, congestive heart failure with ischemic cardiomyopathy, coronary artery disease status post coronary artery bypass with course complicated with vein graft failure x 2, status post PCI LM/D1, and previous smoker. Patient returns today for follow up status post recent intervention. I first evaluated the patient at [facility] 9/25/20 at which time he was treated for unstable angina. Transferred to [facility] for catheterization which revealed multi-vessel coronary artery disease. He underwent three-vessel bypass with Dr. Lawrence Rankin, receiving a LIMA to LAD, SVG to OM and SVG to diagonal. Limited ECHO 9/29/20 confirmed Normal left ventricular systolic function with ejection fraction of 55-60%. At subsequent visits in interim he complained of worsening dyspnea with exertion. Diuretics were up-titrated without relief of symptoms. Repeat echo showed decline in LVEF since bypass. Ischemic evaluation was planned but in interim he was admitted 4/8/-4/2021 due to chest pain. He underwent LHC on 4/14/21 which noted patent LIMA-LAD and occluded vein grafts. On 4/19/21, he underwent rotational atherectomy of the left main, LAD and first diagonal with PCI/ISREAL to D1 as well as LM/LAD. He was readmitted in short order for arm pain at site of prior PICC line. Found to have superficial blood clot in left basilic vein. No anti-coagulation was recommended. Loop/Device check 8/4/2021 showed numerous bradycardia and pause recording. EGMs show Mobitz I/CHB.      Patient underwent medtronic dual PPM implant 8/26/21 by Dr. Inocencio Glover for sinus bradycardia. Device check 9/1/2021 did not show arrhythmias, normal function from device. At the conclusion of 8/2021 visit, patient was referred to cardiac rehab. Last OV 9/17/21, patient reported that he has been experiencing mild dizziness. BP low in evening, high in AM. Stable SOB. He reported his edema had improved. Wt is down from prior. He believes he is at his dry weight (284 lbs). 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 Planning to pursue disability. In interim he had an Echo on 12/1/21 which showed an EF 55%. He also had a Stress test which showed a large sized lateral defect consistent with ischemia. Today we discussed upcoming cath that is scheduled for next week. The patient reports that he has extreme shortness of breath and chest pain. Non-exertional. He reports multiple syncopal episodes at home due to dizziness. This occurred twice. First episode occurred in the setting of taking sublingual nitroglycerin for an episode of chest pain. He got up 10 minutes later and subsequently passed out. Second episode he had been seated following getting short of breath while feeding the dog, when he stood up to walk to the front porch he took less than 10 steps and subsequently passed out again. No trauma to his legs. Does not feel overtly dehydrated but states he is urinating a lot with the Lasix. Drinks lots of water. No other recent changes in medications. Has been titrated off ACE/ARB in the past due to hypotension. Notes his blood pressure is very low in the evenings. Home Medications:  Were reviewed and are listed in nursing record and/or below  Prior to Admission medications    Medication Sig Start Date End Date Taking?  Authorizing Provider   Cholecalciferol (VITAMIN D3) 1.25 MG (67098 UT) CAPS 1 po a week 11/18/21  Yes ANICETO Dey - ROXANA   traZODone (DESYREL) 100 MG tablet Take 1 tablet by medications for this visit. Allergies:  Dilaudid [hydromorphone hcl] and Codeine     Review of Systems:   A 14 point review of symptoms completed. Pertinent positives identified in the HPI, all other review of symptoms negative. Objective:     Vitals:    12/14/21 0757 12/14/21 0841 12/14/21 0843 12/14/21 0844   BP: 106/66 108/72 92/64 92/66   Position:  Supine Sitting Standing   Pulse: 92 81 86 95   SpO2: 94% 93% 94% 94%   Weight: 299 lb (135.6 kg)      Height: 5' 4\" (1.626 m)               Wt Readings from Last 3 Encounters:   12/14/21 299 lb (135.6 kg)   11/16/21 296 lb 4.8 oz (134.4 kg)   11/15/21 294 lb 8 oz (133.6 kg)       PHYSICAL EXAM:    General:  Alert, cooperative, no distress, appears stated age   Head:  Normocephalic, atraumatic   Eyes:  Conjunctiva/corneas clear, anicteric sclerae    Nose: Nares normal, no drainage or sinus tenderness   Throat: No abnormalities of the lips, oral mucosa or tongue. Moist mucous membranes. Neck: Trachea midline. Neck supple with no lymphadenopathy, thyroid not enlarged, symmetric, no tenderness/mass/nodules, no Jugular venous pressure elevation    Lungs:   Clear to auscultation bilaterally, no wheezes, no rales, no respiratory distress   Chest Wall:  Well-healed sternotomy incision. Well healed PPM site. Heart:  Regular rate and rhythm, normal S1, normal S2, no murmur, no rub, no S3/S4, PMI non-palpable. Abdomen:   Obese, soft, non-tender, with normoactive bowel sounds. No masses, no hepatosplenomegaly   Extremities: No cyanosis, clubbing. No edema. Incision for vein harvest right lower extremity well-healed. Vascular: 2+ radial, dorsalis pedis and posterior tibial pulses bilaterally. Brisk carotid upstrokes without carotid bruit. Skin: Skin color, texture, turgor are normal with no rashes or ulceration. Pysch: Euthymic mood, appropriate affect   Neurologic: Oriented to person, place and time. No slurred speech or facial asymmetry.  No motor or sensory deficits on gross examination. Labs:   CBC:   Lab Results   Component Value Date    WBC 10.0 11/11/2021    RBC 5.39 11/11/2021    HGB 14.8 11/11/2021    HCT 46.5 11/11/2021    MCV 86.3 11/11/2021    RDW 18.4 11/11/2021     11/11/2021     CMP:  Lab Results   Component Value Date     11/11/2021    K 4.9 11/11/2021    K 3.6 09/05/2021     11/11/2021    CO2 27 11/11/2021    BUN 17 11/11/2021    CREATININE 0.9 11/11/2021    GFRAA >60 11/11/2021    GFRAA >60 03/16/2011    AGRATIO 1.6 11/11/2021    LABGLOM >60 11/11/2021    GLUCOSE 104 11/11/2021    PROT 6.6 11/11/2021    PROT 6.9 03/16/2011    CALCIUM 9.3 11/11/2021    BILITOT 0.5 11/11/2021    ALKPHOS 114 11/11/2021    AST 19 11/11/2021    ALT 17 11/11/2021     PT/INR:  No results found for: PTINR  HgBA1c:  Lab Results   Component Value Date    LABA1C 6.3 11/11/2021     Lab Results   Component Value Date    TROPONINI <0.01 09/05/2021     Fasting lipid profile from 9/25/2020 reviewed. HDL 34, LDL 16, triglycerides 173, total cholesterol 85. Lab Results   Component Value Date    CHOL 82 11/11/2021    CHOL 85 09/25/2020    CHOL 142 09/19/2020     Lab Results   Component Value Date    TRIG 126 11/11/2021    TRIG 173 (H) 09/25/2020    TRIG 217 (H) 09/19/2020     Lab Results   Component Value Date    HDL 34 (L) 11/11/2021    HDL 35 (L) 01/13/2021    HDL 34 (L) 09/25/2020     Lab Results   Component Value Date    LDLCALC 23 11/11/2021    LDLCALC 33 01/13/2021    LDLCALC 16 09/25/2020     Lab Results   Component Value Date    LABVLDL 25 11/11/2021    LABVLDL 26 01/13/2021    LABVLDL 35 09/25/2020     No results found for: CHOLHDLRATIO    Interval Testing/Data:     Prior EKG's reviewed. ECHO 12/1/21:   Summary   Technically difficult examination secondary to habitus. LV systolic function is normal with EF estimated at 55%. Endocardium not entirely well visualized but no obvious segmental wall   motion abnormalities.    There is mild concentric left ventricular hypertrophy. Normal diastolic function with normal LV filling pressure. Mild mitral annular calcification. Aortic valve appears sclerotic but opens adequately. Individual aortic valve leaflets are not clearly visualized. Cannot r/o   bicuspid valve. ECHO 3/12/21  Summary   Limited echo for left ventricle systolic function. Definity is used for   myocardial border enhancement. LV systolic function is mildly reduced with a visually estimated EF=45-50%. The left ventricle is normal in size with normal wall thickness. More prominent Inferior HK on certain views. Indeterminate diastolic function. Limited ECHO 9/29/20   Summary   Limited only f/u for LVEF post op CABG   Technically difficult examination. Normal left ventricular systolic function with ejection fraction of 55-60%. No regional wall motion abnormalites are seen. Compared to previous study from 8- no changes noted in left   ventricular function. Stress test 12/1/21:   Summary  Abnormal moderate risk myocardial perfusion study. There is a large sized lateral defect consistent with ischemia. The left ventricular size and function are normal.  The estimated left ventricular function is 60%. Stress Protocols   Resting ECG  Normal sinus rhythm; left axis; cannot  r/o prior inferior and anterolateral  infarct; low voltage; nonspecific ST  change      [facility] 4/19/21   Left heart catheterization     LVEDP  5      See diagnostic catheterization report for full details, would add that there is severe tortuosity and eccentric plaque within the left main, LAD and D1 which proximal to mid was 75% and distally was 99%.        CONCLUSIONS:      Successful rotational atherectomy and PCI of D1 with single drug-eluting stent  Successful rotational atherectomy and PCI of left main/LAD with single drug-eluting stent  Medical therapy for LCx , this lesion does not appear to be amenable for PCI      [facility] 4/14/2021  LVGRAM     LVEDP  6   GRADIENT ACROSS AORTIC VALVE  none   LV FUNCTION EF 60%   WALL MOTION  normal   MITRAL REGURGITATION  mild         CORONARY ARTERIES     LM Proximal less than 10% stenosis, middistal haziness with calcification and 70% eccentric stenosis. LAD  Proximalmid diffuse tubular 80% stenosis, there is middistal bidirectional flow noted. Distal vessel is visualized on LIMA angiogram, this shows 60% distal stenosis. D1 is a large vessel with tortuosity noted and ostial/proximal 70% stenosis followed by mid vessel calcification with 50% stenosis and middistal 99% stenosis. LCX Medium to large size vessel, proximal 30 to 40% stenosis. OM 3 appears to be the largest OM and is 100% proximalmid , distal vessel is seen through left to left collaterals and appears to have less than 10% stenosis. RCA Dominant, tortuous, proximalmid 60 to 70% stenosis. There are moderate right to left collaterals to the circumflex. BYPASS GRAFTS     LIMA-LAD Widely patent with less than 10% proximalmiddistal stenosis, the distal LAD has stenosis as noted above. SVG-D1  100% proximal/middistal          SVG-circumflex  100% proximalmiddistal             Findings and plans as noted below were discussed with the patient and family, they understand/agree        CONCLUSIONS:      2 out of 3 bypass grafts are occluded, the LIMA to LAD graft is patent  We will consult with CT surgery regarding options for revascularization which potentially may include redo CABG versus high risk PCI of the left main into diagonal  Will order CTA abd/bilat LE w/ runoff, in case cardiac support devices are needed        Cardiac monitor: 05/12/21        Impression and Plan     1.   Coronary artery disease manifest with unstable angina status post bypass 9/2020 with occluded SVG's to LCx and D1 noted on repeat cath, status post rotation atherectomy/PCI 4/19/2021 with  Baron Molina chest pain/shortness of breath at rest.  Recently performed stress test shows persistent inferolateral wall defect, may be related to  left circumflex but we are planning cardiac catheterization with Dr. Luciana Interiano 12/22  2. Chronic congestive heart failure with reduced EF, compensated by exam today. Wt is stable by home scale. 3.  History of heart block status post dual-chamber PPM   4. Syncope-do suspect this is due to volume depletion. He had a recent device check that was reassuring. 5.  Obstructive sleep apnea noncompliant with CPAP  6. Obesity with BMI 51  7. Low normal BP. Orthostatics are negative though BP does drop with standing  8. Tobacco abuse, now quit.   9.  Chronic obstructive pulmonary disease, moderate by PFTs    Patient Active Problem List   Diagnosis    Crush injury of right foot    Shortness of breath    Erectile dysfunction    Hyperglycemia    Anxiety    Depression    Tobacco abuse    History of alcohol abuse    Fatigue    OANH (obstructive sleep apnea)    Hypersomnia    Chronic obstructive pulmonary disease (HCC)    Lumbar radiculopathy    HNP (herniated nucleus pulposus), lumbar    Spondylosis without myelopathy or radiculopathy, lumbar region    DDD (degenerative disc disease), lumbar    Lumbar spine pain    Acute respiratory distress    Class 3 severe obesity with body mass index (BMI) of 45.0 to 49.9 in adult ([facility]    Pneumonia, primary atypical    Acute respiratory failure with hypoxia (HCC)    Moderate protein-calorie malnutrition (HCC)    Acute respiratory failure (HCC)    Acute on chronic respiratory failure with hypoxemia (HCC)    Acute diastolic congestive heart failure (HCC)    Hyperlipidemia    Insomnia    Morbid obesity with BMI of 45.0-49.9, adult (HCC)    Abnormal cardiovascular stress test    Coronary artery disease involving native heart    S/P three vessel coronary artery bypass    Acute kidney injury ([facility]    Chronic combined systolic and diastolic congestive heart failure (HCC)    Mobitz type I Wenckebach atrioventricular block    Asystole (HCC)    Ischemic cardiomyopathy    Diverticulitis of colon    Coronary artery disease involving native coronary artery of native heart with unstable angina pectoris (HCC)    CHB (complete heart block) (HCC)    Sinus bradycardia    Cellulitis    Acute deep vein thrombosis (DVT) of left upper extremity (HCC)    Cellulitis of chest wall    Painful orthopaedic hardware (HCC)    SSS (sick sinus syndrome) (HCC)    Observed sleep apnea       PLAN:  1. Unclear to me if chest pain is true angina. His shortness of breath certainly multifactorial with obesity, COPD, CAD and congestive heart failure. Syncope is concerning though I suspect this is due to dehydration. We will be making his Lasix as needed for weight gain exceeding 300 pounds. Planning cath on 12/22/21 with Dr. Rodolfo Garcia for further evaluation. Recent device check was reassuring  2. Plan to reengage with cardiac rehab following cardiac catheterization as above  3. Continue other cardiac medications as prescribed. Follow up with me in 6 months    Scribe's attestation: This note was scribed in the presence of Dr. Amisha Arreola M.D. By Hiram Cruz RN    The scribes documentation has been prepared under my direction and personally reviewed by me in its entirety. I confirm that the note above accurately reflects all work, treatment, procedures, and medical decision making performed by me. Lenore Walden MD, personally performed the services described in this documentation as scribed by  Hiram Cruz RN in my presence, and it is both accurate and complete to the best of our ability. I will address the patient's cardiac risk factors and adjusted pharmacologic treatment as needed. In addition, I have reinforced the need for patient directed risk factor modification.   All questions and concerns were addressed to the patient/family. Alternatives to my treatment were discussed. Thank you for allowing us to participate in the care of Som Duron. Please call me with any questions [phone].     Dinesh Dinero MD, [facility]   Cardiovascular Disease  [address]  [phone] [facility]  [phone] [address] [place] [address]  12/14/2021 8:35 AM

## 2021-12-15 ENCOUNTER — APPOINTMENT (OUTPATIENT)
Dept: CARDIAC REHAB | Age: 61
End: 2021-12-15
Payer: COMMERCIAL

## 2021-12-17 ENCOUNTER — APPOINTMENT (OUTPATIENT)
Dept: CARDIAC REHAB | Age: 61
End: 2021-12-17
Payer: COMMERCIAL

## 2021-12-20 ENCOUNTER — PATIENT MESSAGE (OUTPATIENT)
Dept: CARDIOLOGY CLINIC | Age: 61
End: 2021-12-20

## 2021-12-20 ENCOUNTER — APPOINTMENT (OUTPATIENT)
Dept: CARDIAC REHAB | Age: 61
End: 2021-12-20
Payer: COMMERCIAL

## 2021-12-21 ENCOUNTER — TELEPHONE (OUTPATIENT)
Dept: CARDIOLOGY CLINIC | Age: 61
End: 2021-12-21

## 2021-12-21 RX ORDER — ATORVASTATIN CALCIUM 80 MG/1
80 TABLET, FILM COATED ORAL NIGHTLY
Qty: 90 TABLET | Refills: 3 | Status: SHIPPED | OUTPATIENT
Start: 2021-12-21 | End: 2022-04-12 | Stop reason: SDUPTHER

## 2021-12-21 NOTE — TELEPHONE ENCOUNTER
Pt called back and I was able to fill out paperwork with his responses.  Will go over forms with GLORIA

## 2021-12-21 NOTE — TELEPHONE ENCOUNTER
From: Lorri Jimenez  To: Dr. Johan Arellano: 12/20/2021 10:30 AM EST  Subject: Atorvastatin 80mg     A new prescription needs to be sent over to pharmacy for a refill. I am now at [address] [place] [address] in [facility] and their number is [phone]. Thank you.

## 2021-12-22 ENCOUNTER — APPOINTMENT (OUTPATIENT)
Dept: CARDIAC REHAB | Age: 61
End: 2021-12-22
Payer: COMMERCIAL

## 2021-12-22 ENCOUNTER — HOSPITAL ENCOUNTER (OUTPATIENT)
Dept: CARDIAC CATH/INVASIVE PROCEDURES | Age: 61
Setting detail: OBSERVATION
Discharge: HOME | End: 2021-12-23
Attending: INTERNAL MEDICINE | Admitting: INTERNAL MEDICINE
Payer: COMMERCIAL

## 2021-12-22 PROBLEM — I25.10 ASHD (ARTERIOSCLEROTIC HEART DISEASE): Status: ACTIVE | Noted: 2021-12-22

## 2021-12-22 LAB
ANION GAP SERPL CALCULATED.3IONS-SCNC: 9 MMOL/L (ref 3–16)
BUN BLDV-MCNC: 11 MG/DL (ref 7–20)
CALCIUM SERPL-MCNC: 9.3 MG/DL (ref 8.3–10.6)
CHLORIDE BLD-SCNC: 101 MMOL/L (ref 99–110)
CO2: 29 MMOL/L (ref 21–32)
CREAT SERPL-MCNC: 0.7 MG/DL (ref 0.9–1.3)
EKG ATRIAL RATE: 80 BPM
EKG DIAGNOSIS: NORMAL
EKG P AXIS: 56 DEGREES
EKG P-R INTERVAL: 186 MS
EKG Q-T INTERVAL: 342 MS
EKG QRS DURATION: 76 MS
EKG QTC CALCULATION (BAZETT): 394 MS
EKG R AXIS: -59 DEGREES
EKG T AXIS: 75 DEGREES
EKG VENTRICULAR RATE: 80 BPM
GFR AFRICAN AMERICAN: >60
GFR NON-AFRICAN AMERICAN: >60
GLUCOSE BLD-MCNC: 152 MG/DL (ref 70–99)
HCT VFR BLD CALC: 46.7 % (ref 40.5–52.5)
HEMOGLOBIN: 15.2 G/DL (ref 13.5–17.5)
LV EF: 58 %
LVEF MODALITY: NORMAL
MCH RBC QN AUTO: 27.8 PG (ref 26–34)
MCHC RBC AUTO-ENTMCNC: 32.6 G/DL (ref 31–36)
MCV RBC AUTO: 85.2 FL (ref 80–100)
PDW BLD-RTO: 16 % (ref 12.4–15.4)
PLATELET # BLD: 202 K/UL (ref 135–450)
PMV BLD AUTO: 7.4 FL (ref 5–10.5)
POC ACT LR: 371 SEC
POC ACT LR: >400 SEC
POTASSIUM SERPL-SCNC: 4.2 MMOL/L (ref 3.5–5.1)
RBC # BLD: 5.47 M/UL (ref 4.2–5.9)
SARS-COV-2, NAAT: NOT DETECTED
SODIUM BLD-SCNC: 139 MMOL/L (ref 136–145)
WBC # BLD: 10.6 K/UL (ref 4–11)

## 2021-12-22 PROCEDURE — 2580000003 HC RX 258

## 2021-12-22 PROCEDURE — 6360000002 HC RX W HCPCS

## 2021-12-22 PROCEDURE — 87635 SARS-COV-2 COVID-19 AMP PRB: CPT

## 2021-12-22 PROCEDURE — 6370000000 HC RX 637 (ALT 250 FOR IP): Performed by: INTERNAL MEDICINE

## 2021-12-22 PROCEDURE — G0378 HOSPITAL OBSERVATION PER HR: HCPCS

## 2021-12-22 PROCEDURE — 92978 ENDOLUMINL IVUS OCT C 1ST: CPT

## 2021-12-22 PROCEDURE — C1894 INTRO/SHEATH, NON-LASER: HCPCS

## 2021-12-22 PROCEDURE — C1887 CATHETER, GUIDING: HCPCS

## 2021-12-22 PROCEDURE — C1753 CATH, INTRAVAS ULTRASOUND: HCPCS

## 2021-12-22 PROCEDURE — 93010 ELECTROCARDIOGRAM REPORT: CPT | Performed by: INTERNAL MEDICINE

## 2021-12-22 PROCEDURE — C1724 CATH, TRANS ATHEREC,ROTATION: HCPCS

## 2021-12-22 PROCEDURE — 92978 ENDOLUMINL IVUS OCT C 1ST: CPT | Performed by: INTERNAL MEDICINE

## 2021-12-22 PROCEDURE — 93005 ELECTROCARDIOGRAM TRACING: CPT | Performed by: INTERNAL MEDICINE

## 2021-12-22 PROCEDURE — 6360000002 HC RX W HCPCS: Performed by: INTERNAL MEDICINE

## 2021-12-22 PROCEDURE — 85027 COMPLETE CBC AUTOMATED: CPT

## 2021-12-22 PROCEDURE — 6360000004 HC RX CONTRAST MEDICATION

## 2021-12-22 PROCEDURE — C1874 STENT, COATED/COV W/DEL SYS: HCPCS

## 2021-12-22 PROCEDURE — 2500000003 HC RX 250 WO HCPCS

## 2021-12-22 PROCEDURE — 85347 COAGULATION TIME ACTIVATED: CPT

## 2021-12-22 PROCEDURE — 92928 PRQ TCAT PLMT NTRAC ST 1 LES: CPT | Performed by: INTERNAL MEDICINE

## 2021-12-22 PROCEDURE — 92933 PRQ TRLML C ATHRC ST ANGIOP1: CPT

## 2021-12-22 PROCEDURE — C1776 JOINT DEVICE (IMPLANTABLE): HCPCS

## 2021-12-22 PROCEDURE — 99153 MOD SED SAME PHYS/QHP EA: CPT

## 2021-12-22 PROCEDURE — 6370000000 HC RX 637 (ALT 250 FOR IP)

## 2021-12-22 PROCEDURE — 93458 L HRT ARTERY/VENTRICLE ANGIO: CPT

## 2021-12-22 PROCEDURE — 2580000003 HC RX 258: Performed by: INTERNAL MEDICINE

## 2021-12-22 PROCEDURE — 99152 MOD SED SAME PHYS/QHP 5/>YRS: CPT

## 2021-12-22 PROCEDURE — 92979 ENDOLUMINL IVUS OCT C EA: CPT

## 2021-12-22 PROCEDURE — 94640 AIRWAY INHALATION TREATMENT: CPT

## 2021-12-22 PROCEDURE — C1761 HC CATH TRANSLUM INTRAVASCULAR LITHOTRIPSY CORONARY: HCPCS

## 2021-12-22 PROCEDURE — 2709999900 HC NON-CHARGEABLE SUPPLY

## 2021-12-22 PROCEDURE — 92928 PRQ TCAT PLMT NTRAC ST 1 LES: CPT

## 2021-12-22 PROCEDURE — C1725 CATH, TRANSLUMIN NON-LASER: HCPCS

## 2021-12-22 PROCEDURE — C1769 GUIDE WIRE: HCPCS

## 2021-12-22 PROCEDURE — 93459 L HRT ART/GRFT ANGIO: CPT

## 2021-12-22 PROCEDURE — 80048 BASIC METABOLIC PNL TOTAL CA: CPT

## 2021-12-22 PROCEDURE — 93459 L HRT ART/GRFT ANGIO: CPT | Performed by: INTERNAL MEDICINE

## 2021-12-22 RX ORDER — MIDAZOLAM HYDROCHLORIDE 5 MG/ML
INJECTION INTRAMUSCULAR; INTRAVENOUS
Status: COMPLETED | OUTPATIENT
Start: 2021-12-22 | End: 2021-12-22

## 2021-12-22 RX ORDER — ACETAMINOPHEN 325 MG/1
650 TABLET ORAL EVERY 4 HOURS PRN
Status: DISCONTINUED | OUTPATIENT
Start: 2021-12-22 | End: 2021-12-23 | Stop reason: HOSPADM

## 2021-12-22 RX ORDER — METOPROLOL SUCCINATE 25 MG/1
25 TABLET, EXTENDED RELEASE ORAL DAILY
Status: DISCONTINUED | OUTPATIENT
Start: 2021-12-22 | End: 2021-12-23 | Stop reason: HOSPADM

## 2021-12-22 RX ORDER — TRAZODONE HYDROCHLORIDE 50 MG/1
100 TABLET ORAL 2 TIMES DAILY
Status: DISCONTINUED | OUTPATIENT
Start: 2021-12-22 | End: 2021-12-23 | Stop reason: HOSPADM

## 2021-12-22 RX ORDER — ALBUTEROL SULFATE 90 UG/1
2 AEROSOL, METERED RESPIRATORY (INHALATION) EVERY 6 HOURS PRN
Status: DISCONTINUED | OUTPATIENT
Start: 2021-12-22 | End: 2021-12-23 | Stop reason: HOSPADM

## 2021-12-22 RX ORDER — SODIUM CHLORIDE 9 MG/ML
INJECTION, SOLUTION INTRAVENOUS CONTINUOUS
Status: DISCONTINUED | OUTPATIENT
Start: 2021-12-22 | End: 2021-12-23 | Stop reason: HOSPADM

## 2021-12-22 RX ORDER — SODIUM CHLORIDE 0.9 % (FLUSH) 0.9 %
5-40 SYRINGE (ML) INJECTION EVERY 12 HOURS SCHEDULED
Status: DISCONTINUED | OUTPATIENT
Start: 2021-12-22 | End: 2021-12-23 | Stop reason: HOSPADM

## 2021-12-22 RX ORDER — FENTANYL CITRATE 50 UG/ML
INJECTION, SOLUTION INTRAMUSCULAR; INTRAVENOUS
Status: COMPLETED | OUTPATIENT
Start: 2021-12-22 | End: 2021-12-22

## 2021-12-22 RX ORDER — M-VIT,TX,IRON,MINS/CALC/FOLIC 27MG-0.4MG
1 TABLET ORAL DAILY
COMMUNITY

## 2021-12-22 RX ORDER — CLOPIDOGREL BISULFATE 75 MG/1
75 TABLET ORAL DAILY
Status: DISCONTINUED | OUTPATIENT
Start: 2021-12-23 | End: 2021-12-23 | Stop reason: HOSPADM

## 2021-12-22 RX ORDER — CLOPIDOGREL 300 MG/1
TABLET, FILM COATED ORAL
Status: COMPLETED | OUTPATIENT
Start: 2021-12-22 | End: 2021-12-22

## 2021-12-22 RX ORDER — FUROSEMIDE 40 MG/1
40 TABLET ORAL PRN
Status: DISCONTINUED | OUTPATIENT
Start: 2021-12-22 | End: 2021-12-23 | Stop reason: HOSPADM

## 2021-12-22 RX ORDER — BUDESONIDE AND FORMOTEROL FUMARATE DIHYDRATE 160; 4.5 UG/1; UG/1
2 AEROSOL RESPIRATORY (INHALATION) 2 TIMES DAILY
Status: DISCONTINUED | OUTPATIENT
Start: 2021-12-22 | End: 2021-12-23 | Stop reason: HOSPADM

## 2021-12-22 RX ORDER — MORPHINE SULFATE 4 MG/ML
2 INJECTION, SOLUTION INTRAMUSCULAR; INTRAVENOUS EVERY 4 HOURS PRN
Status: DISCONTINUED | OUTPATIENT
Start: 2021-12-22 | End: 2021-12-23 | Stop reason: HOSPADM

## 2021-12-22 RX ORDER — ALBUTEROL SULFATE 2.5 MG/3ML
2.5 SOLUTION RESPIRATORY (INHALATION) EVERY 6 HOURS PRN
Status: DISCONTINUED | OUTPATIENT
Start: 2021-12-22 | End: 2021-12-23 | Stop reason: HOSPADM

## 2021-12-22 RX ORDER — DULOXETIN HYDROCHLORIDE 60 MG/1
60 CAPSULE, DELAYED RELEASE ORAL DAILY
Status: DISCONTINUED | OUTPATIENT
Start: 2021-12-22 | End: 2021-12-23 | Stop reason: HOSPADM

## 2021-12-22 RX ORDER — SODIUM CHLORIDE 9 MG/ML
25 INJECTION, SOLUTION INTRAVENOUS PRN
Status: DISCONTINUED | OUTPATIENT
Start: 2021-12-22 | End: 2021-12-23 | Stop reason: HOSPADM

## 2021-12-22 RX ORDER — NITROGLYCERIN 0.4 MG/1
0.4 TABLET SUBLINGUAL EVERY 5 MIN PRN
Status: DISCONTINUED | OUTPATIENT
Start: 2021-12-22 | End: 2021-12-23 | Stop reason: HOSPADM

## 2021-12-22 RX ORDER — MORPHINE SULFATE 4 MG/ML
2 INJECTION, SOLUTION INTRAMUSCULAR; INTRAVENOUS EVERY 4 HOURS PRN
Status: DISCONTINUED | OUTPATIENT
Start: 2021-12-22 | End: 2021-12-22

## 2021-12-22 RX ORDER — DIPHENHYDRAMINE HYDROCHLORIDE 50 MG/ML
INJECTION INTRAMUSCULAR; INTRAVENOUS
Status: COMPLETED | OUTPATIENT
Start: 2021-12-22 | End: 2021-12-22

## 2021-12-22 RX ORDER — ATORVASTATIN CALCIUM 80 MG/1
80 TABLET, FILM COATED ORAL NIGHTLY
Status: DISCONTINUED | OUTPATIENT
Start: 2021-12-22 | End: 2021-12-23 | Stop reason: HOSPADM

## 2021-12-22 RX ORDER — ASPIRIN 81 MG/1
81 TABLET ORAL DAILY
Status: DISCONTINUED | OUTPATIENT
Start: 2021-12-22 | End: 2021-12-23 | Stop reason: HOSPADM

## 2021-12-22 RX ORDER — SODIUM CHLORIDE 0.9 % (FLUSH) 0.9 %
5-40 SYRINGE (ML) INJECTION PRN
Status: DISCONTINUED | OUTPATIENT
Start: 2021-12-22 | End: 2021-12-23 | Stop reason: HOSPADM

## 2021-12-22 RX ORDER — M-VIT,TX,IRON,MINS/CALC/FOLIC 27MG-0.4MG
1 TABLET ORAL DAILY
Status: DISCONTINUED | OUTPATIENT
Start: 2021-12-22 | End: 2021-12-23 | Stop reason: HOSPADM

## 2021-12-22 RX ADMIN — SODIUM CHLORIDE: 9 INJECTION, SOLUTION INTRAVENOUS at 11:47

## 2021-12-22 RX ADMIN — MIDAZOLAM HYDROCHLORIDE 1 MG: 5 INJECTION INTRAMUSCULAR; INTRAVENOUS at 10:00

## 2021-12-22 RX ADMIN — TRAZODONE HYDROCHLORIDE 100 MG: 50 TABLET ORAL at 20:25

## 2021-12-22 RX ADMIN — FENTANYL CITRATE 25 MCG: 50 INJECTION, SOLUTION INTRAMUSCULAR; INTRAVENOUS at 10:05

## 2021-12-22 RX ADMIN — MIDAZOLAM HYDROCHLORIDE 1 MG: 5 INJECTION INTRAMUSCULAR; INTRAVENOUS at 11:28

## 2021-12-22 RX ADMIN — FENTANYL CITRATE 25 MCG: 50 INJECTION, SOLUTION INTRAMUSCULAR; INTRAVENOUS at 10:00

## 2021-12-22 RX ADMIN — DIPHENHYDRAMINE HYDROCHLORIDE 25 MG: 50 INJECTION INTRAMUSCULAR; INTRAVENOUS at 10:31

## 2021-12-22 RX ADMIN — FENTANYL CITRATE 25 MCG: 50 INJECTION, SOLUTION INTRAMUSCULAR; INTRAVENOUS at 10:21

## 2021-12-22 RX ADMIN — CLOPIDOGREL 300 MG: 300 TABLET, FILM COATED ORAL at 11:46

## 2021-12-22 RX ADMIN — FENTANYL CITRATE 25 MCG: 50 INJECTION, SOLUTION INTRAMUSCULAR; INTRAVENOUS at 10:51

## 2021-12-22 RX ADMIN — MIDAZOLAM HYDROCHLORIDE 1 MG: 5 INJECTION INTRAMUSCULAR; INTRAVENOUS at 10:05

## 2021-12-22 RX ADMIN — METOPROLOL SUCCINATE 25 MG: 25 TABLET, EXTENDED RELEASE ORAL at 17:58

## 2021-12-22 RX ADMIN — MORPHINE SULFATE 2 MG: 4 INJECTION, SOLUTION INTRAMUSCULAR; INTRAVENOUS at 22:21

## 2021-12-22 RX ADMIN — FENTANYL CITRATE 25 MCG: 50 INJECTION, SOLUTION INTRAMUSCULAR; INTRAVENOUS at 09:17

## 2021-12-22 RX ADMIN — MORPHINE SULFATE 2 MG: 4 INJECTION, SOLUTION INTRAMUSCULAR; INTRAVENOUS at 14:03

## 2021-12-22 RX ADMIN — MIDAZOLAM HYDROCHLORIDE 1 MG: 5 INJECTION INTRAMUSCULAR; INTRAVENOUS at 10:21

## 2021-12-22 RX ADMIN — FENTANYL CITRATE 25 MCG: 50 INJECTION, SOLUTION INTRAMUSCULAR; INTRAVENOUS at 11:12

## 2021-12-22 RX ADMIN — FENTANYL CITRATE 25 MCG: 50 INJECTION, SOLUTION INTRAMUSCULAR; INTRAVENOUS at 10:33

## 2021-12-22 RX ADMIN — MIDAZOLAM HYDROCHLORIDE 1 MG: 5 INJECTION INTRAMUSCULAR; INTRAVENOUS at 10:58

## 2021-12-22 RX ADMIN — Medication 2 PUFF: at 20:08

## 2021-12-22 RX ADMIN — FENTANYL CITRATE 25 MCG: 50 INJECTION, SOLUTION INTRAMUSCULAR; INTRAVENOUS at 09:33

## 2021-12-22 RX ADMIN — MIDAZOLAM HYDROCHLORIDE 1 MG: 5 INJECTION INTRAMUSCULAR; INTRAVENOUS at 09:15

## 2021-12-22 RX ADMIN — FENTANYL CITRATE 50 MCG: 50 INJECTION, SOLUTION INTRAMUSCULAR; INTRAVENOUS at 11:28

## 2021-12-22 RX ADMIN — DULOXETINE HYDROCHLORIDE 60 MG: 60 CAPSULE, DELAYED RELEASE ORAL at 17:58

## 2021-12-22 RX ADMIN — MIDAZOLAM HYDROCHLORIDE 1 MG: 5 INJECTION INTRAMUSCULAR; INTRAVENOUS at 09:23

## 2021-12-22 RX ADMIN — DIPHENHYDRAMINE HYDROCHLORIDE 25 MG: 50 INJECTION INTRAMUSCULAR; INTRAVENOUS at 10:57

## 2021-12-22 RX ADMIN — Medication 10 ML: at 22:22

## 2021-12-22 RX ADMIN — ACETAMINOPHEN 650 MG: 325 TABLET ORAL at 20:24

## 2021-12-22 RX ADMIN — FENTANYL CITRATE 25 MCG: 50 INJECTION, SOLUTION INTRAMUSCULAR; INTRAVENOUS at 09:15

## 2021-12-22 RX ADMIN — ATORVASTATIN CALCIUM 80 MG: 80 TABLET, FILM COATED ORAL at 20:25

## 2021-12-22 RX ADMIN — FENTANYL CITRATE 25 MCG: 50 INJECTION, SOLUTION INTRAMUSCULAR; INTRAVENOUS at 10:55

## 2021-12-22 RX ADMIN — MORPHINE SULFATE 2 MG: 4 INJECTION, SOLUTION INTRAMUSCULAR; INTRAVENOUS at 17:58

## 2021-12-22 RX ADMIN — Medication 1 TABLET: at 17:58

## 2021-12-22 RX ADMIN — MIDAZOLAM HYDROCHLORIDE 1 MG: 5 INJECTION INTRAMUSCULAR; INTRAVENOUS at 10:33

## 2021-12-22 ASSESSMENT — PAIN DESCRIPTION - FREQUENCY
FREQUENCY: CONTINUOUS

## 2021-12-22 ASSESSMENT — PAIN - FUNCTIONAL ASSESSMENT
PAIN_FUNCTIONAL_ASSESSMENT: ACTIVITIES ARE NOT PREVENTED

## 2021-12-22 ASSESSMENT — PAIN DESCRIPTION - DESCRIPTORS
DESCRIPTORS: SHARP;STABBING

## 2021-12-22 ASSESSMENT — PAIN DESCRIPTION - LOCATION
LOCATION: BACK

## 2021-12-22 ASSESSMENT — PAIN SCALES - GENERAL
PAINLEVEL_OUTOF10: 6
PAINLEVEL_OUTOF10: 6
PAINLEVEL_OUTOF10: 7
PAINLEVEL_OUTOF10: 5
PAINLEVEL_OUTOF10: 6
PAINLEVEL_OUTOF10: 6

## 2021-12-22 ASSESSMENT — PAIN DESCRIPTION - ONSET
ONSET: ON-GOING

## 2021-12-22 ASSESSMENT — PAIN DESCRIPTION - PAIN TYPE
TYPE: ACUTE PAIN

## 2021-12-22 NOTE — PROCEDURES
CARDIAC CATHETERIZATION REPORT     Procedure Date:  2021  Patient Name: Jayde Marcum  MRN: [id number] : 1960      INDICATION       Intermediate risk abnormal stress test    PROCEDURES PERFORMED     Left heart catheterization  LVgram  Aortogram  Coronary angiogam  Coronary cath  LIMA angiogram  Monitoring of moderate conscious sedation    IVUS of D1  PCI of D1 with single drug-eluting stent    IVUS of RCA  Rotational atherectomy of RCA  Shockwave coronary lithotripsy of RCA  PCI of RCA with single drug-eluting stent      PROCEDURE DESCRIPTION   Risks/benefits/alternatives/outcomes were discussed with patient and/or family and informed consent was obtained. Using the [facility] scale, the patient's right radial artery was found to be a level B. Patient was prepped draped in the usual sterile fashion. Local anaesthetic was applied over puncture sites. Using ultrasound guidance, unsuccessful attempt was made at right radial artery cannulation, and as such using ultrasound and fluoroscopic technique, attention turned towards the right groin and a 5 Arabic sheath was inserted into the right femoral artery. Diagnostic 5 Arabic pigtail, JL 4.5, 3 DRC catheters used for diagnostic angiography. Attention then turned towards PCI as noted below. At end of procedure, the interventional sheath was sutured in place for later removal.    There were no immediate complications. I supervised sedation from 9:15 AM to 11:45 AM with versed 9 mg/fentanyl 300 mcg during the procedure. An independent trained observer pushed meds at my direction. We monitored the patient's level of consciousness and vital signs/physiologic status throughout the procedure duration (see times listed previously). 285 cc contrast was utilized. <20cc EBL. FINDINGS         LVGRAM    LVEDP  10   GRADIENT ACROSS AORTIC VALVE  none   LV FUNCTION EF 55-60%   WALL MOTION Subtle mid inferior hypokinesis.    MITRAL REGURGITATION  mild Sumanth Tran  mildly increased in size   AORTIC INSUFFICIENCY  minimal   BYPASS GRAFTS  no bypass grafts visualized         CORONARY ARTERIES    LM [id number] less than 10% stenosis, there is a stent noted in the mid to distal left main that is widely patent. LAD Proximal-mid 80% stenosis, there is mid-distal nonvisualization of vessel due to bidirectional flow from the LIMA graft. LCX  Calcified, ostial 85% stenosis, proximal-mid 20% stenosis. OM 1 has 100% [id number] . There are well-developed right to left collaterals as well as some left to left collaterals. RCA Large vessel, dominant, calcified, mild to moderately tortuous, proximal-mid 75% stenosis. Distal 50 to 60% stenosis. PDA is a medium to large size vessel with proximal-mid 60-70% stenosis. BYPASS GRAFTS    LIMA-LAD  widely patent, there is less than 10% proximal-mid stenosis, however distally at the anastomosis there is a 60% stenosis. SVG-D1  known to be occluded, not injected       SVG-OM  known to be occluded, not injected             PERCUTANEOUS INTERVENTION DESCRIPTION     Bivalirudin was used for anticoagulation, at the end of the procedure, patient was given oral loading dose of Plavix as well as a single bolus of Integrilin. The initial 5 [place]e groin sheath was upsized to a 6 Western Janis Terumo 45 cm destination sheath. A 6 Senegalese VL 3.5 guiding cath was used to intubate the left main. A run-through wire was used to cross the lesions in D1 and the lesions of D1 were assessed with IVUS which showed severe in-stent restenosis with a minimal luminal diameter of 2.75 to 3 mm. As such lesions were treated with a 2.75 mm cutting balloon as well as a 3 mm noncompliant balloon and then were stented with a Medtronic resolute [place] 2.75 x 18 mm drug-eluting stent. Stent was postdilated with 3 mm noncompliant balloon.   Follow-up IVUS showed good stent apposition/expansion, there was no residual dissection/thrombus noted. As such, attention turned towards the RCA and the initial 6 [place] Terumo destination sheath was upsized to a 7 Western Janis 45 cm Terumo destination sheath in the right groin. Then a [address] guiding cath was used to intubate the RCA. A run-through wire was used to cross the lesions in the RCA and IVUS was performed of the RCA which showed extensive calcification in the proximal-mid vessel with 75% stenoses. As such a Rotafloppy wire was taken and that was used to cross the lesions and the run-through wire was removed. Rotational atherectomy was then performed with a 1.5 mm angela. Shockwave coronary lithotripsy was then performed with a 4 mm balloon. Lesion was then stented with assistance of 7 Western Janis guide extension catheter with a [employer] resolute [place] 4 x 34 mm drug-eluting stent and stent was postdilated with a 4 mm noncompliant balloon and additionally in the proximal segment with a 5 mm noncompliant balloon. Follow-up IVUS showed good stent apposition/expansion. There was no residual dissection/thrombus noted. There was mild to moderate disease noted in the mid-distal vessel beyond the stented segment and this was felt to be best treated medically. During the procedure, patient was restless and required redirection as well as increased sedation. Groin site remained stable throughout the case, overall, hemodynamics and EKG were stable although during the procedure he did have to be supported with phenylephrine but this was ultimately able to be weaned at the end of the procedure.         CONCLUSIONS:     Successful PCI of D1 with single drug-eluting stent    Successful rotational atherectomy of RCA  Successful shockwave coronary lithotripsy of RCA  Successful PCI of RCA with single drug-eluting stent    Consider staged PCI of LIMA to LAD    Consider referral to [facility] Dr Gaby Mercedes for consideration of LCx  PCI

## 2021-12-22 NOTE — H&P
Brief Pre-Op Note/Sedation Assessment      Anita Wilson  1960  [id number]  9:07 AM    Planned Procedure: Cardiac Catheterization Procedure  Post Procedure Plan: Return to same level of care  Consent: I have discussed with the patient and/or the patient representative the indication, alternatives, and the possible risks and/or complications of the planned procedure and the anesthesia methods. The patient and/or patient representative appear to understand and agree to proceed. DISCUSSION OF CARDIAC CATHETERIZATION PROCEDURES: The procedures, indications, risks and alternatives have been discussed with the patient and, as appropriate, with the patient's guardian . Risks discussed included, but are not limited to, bleeding, development of blood clots/emboli, damage to blood vessels, renal failure, malignant cardiac arrhythmias, stroke, heart attack, emergent coronary bypass surgery, death, dye allergy. The patient (and guardian as appropriate) expressed understanding of the aforementioned and wished to proceed. Chief Complaint:   Chest Pain/Pressure  Dyspnea      Indications for Cath Procedure:  1. Presentation:  Worsening Angina  2. Anginal Classification within 2 weeks:  CCS III - Symptoms with everyday living activities, i.e., moderate limitation  3. Angina Symptoms Assessment:  Typical Chest Pain  4. Heart Failure Class within last 2 weeks:  Yes:  Heart Failure Type: Diastolic Severity:  Class III - Symptoms of HF on less-than-ordinary exertion  5. Cardiovascular Instability:  No    Prior Ischemic Workup/Eval:  1. Pre-Procedural Medications: Yes: Aspirin, Beta Blockers and STATIN  2. Stress Test Completed? Yes:  Stress or Imaging Studies Performed (within ANY time period):   Type:  Stress Nuclear  Results:  Positive:  Myocardial Perfusion Defects (Nuclear) Extent of Ischemia:  Intermediate    Does Patient need surgery?   Cath Valve Surgery:  No    Pre-Procedure Medical History:  Vital Signs: Ht 5' 4\" (1.626 m)   Wt (!) 300 lb 4.8 oz (136.2 kg)   BMI 51.55 kg/m²     Bp 145/60  Hr 77    Allergies:     Allergies   Allergen Reactions    Dilaudid [Hydromorphone Hcl] Nausea And Vomiting    Codeine Itching     Medications:    Current Facility-Administered Medications   Medication Dose Route Frequency Provider Last Rate Last Admin    0.9 % sodium chloride infusion   IntraVENous Continuous Doroteoe MD Ministerio           Past Medical History:    Past Medical History:   Diagnosis Date    Acute diverticulitis 04/10/2021    Acute kidney injury ([address] 04/08/2021    Acute on chronic respiratory failure with hypoxemia (HCC)     Acute respiratory failure ([address] 08/19/2020    Acute respiratory failure with hypoxia ([address]     Anxiety 01/06/2020    Aortic valve calcification 09/2020    seen on lung CT    CAD in native artery 04/14/2021    Chronic obstructive pulmonary disease ([address] 09/03/2019    PFT done 9/03/19    Chronic systolic congestive heart failure ([address] 04/10/2021    Class 3 severe obesity with body mass index (BMI) of 45.0 to 49.9 in adult [facility])     Coronary artery calcification 09/2020    seen on lung ct    Coronary artery disease involving native heart with angina pectoris ([address] 09/22/2020    Crush injury of right foot 07/23/2015    DDD (degenerative disc disease), lumbar 01/06/2020    Depression 01/06/2020    Diverticulitis of colon 04/10/2021    Erectile dysfunction 01/06/2020    Fatigue 01/06/2020    HNP (herniated nucleus pulposus), lumbar 01/06/2020    Hyperglycemia 01/06/2020    Hyperlipidemia     Hypersomnia 01/06/2020    Hypertension     Insomnia     Ischemic cardiomyopathy     Kidney stone     Lumbar radiculopathy 01/06/2020    Lumbar spine pain 01/06/2020    LVH (left ventricular hypertrophy)     seen on echo 8/2020, moderate    Mobitz type I Wenckebach atrioventricular block 04/10/2021    Moderate protein-calorie malnutrition ([address] 03/17/2020    Observed sleep apnea 01/06/2020    OANH (obstructive sleep apnea) 01/06/2020    no C-pap is getting tested    Pneumonia, primary atypical     Reactive depression 01/06/2020    S/P three vessel coronary artery bypass 10/26/2020    Spondylosis without myelopathy or radiculopathy, lumbar region 01/06/2020    Tobacco abuse 01/06/2020    Tobacco use 01/06/2020    Wears dentures     full set       Surgical History:    Past Surgical History:   Procedure Laterality Date    ANKLE SURGERY Left 8/2/2021    LEFT FOOT SCREW REMOVAL performed by Collins Claire MD at [address]  03/2021    CORONARY ARTERY BYPASS GRAFT N/A 9/25/2020    CORONARY ARTERY BYPASS GRAFTING X3, INTERNAL MAMMARY ARTERY, SAPHENOUS VEIN GRAFT, ON PUMP, STERNAL PLATING, 5 LEVEL BILATERAL INTERCOSTAL NERVE BLOCK, PLATELET GEL APPLICATION performed by Shruthi Martinez MD at [address] [facility]  03/16/2011    cystoscopy left ureteroscopy, left retrograde, double J stent placement    FINGER AMPUTATION Right 2/2/2021    RIGHT MIDDLE FINGER IRRIGATION AND DEBRIDEMENT WITH REVISION AMPUTATION AND BONE SHORTENING, POSSIBLE NAIL ABLATION performed by Adelaida Perez MD at [address] [facility] Right 12/24/2019    RIGHT LUMBAR FIVE SACRAL ONE TRANSFORAMINAL EPIDURAL STEROID INJECTION SITE CONFIRMED BY FLUOROSCOPY performed by Sandi Emery MD at [address] [place] [address] Right 1/7/2020    RIGHT LUMBAR FOUR AND LUMBAR FIVE TRANSFORAMINAL EPIDURAL STEROID INJECTION SITE CONFIRMED BY FLUOROSCOPY performed by Sandi Emery MD at [place] [address]             Pre-Sedation:  Pre-Sedation Documentation and Exam:  I have assessed the patient and reviewed the H&P on the chart.     Prior History of Anesthesia Complications:   none    Modified Mallampati:  III (soft palate, base of uvula visible)    ASA Classification:  Class 3 - A patient with severe systemic disease that limits activity but is not incapacitating    Adele Scale: Activity:  2 - Able to move 4 extremities voluntarily on command  Respiration:  2 - Able to breathe deeply and cough freely  Circulation:  2 - BP+/- 20mmHg of normal  Consciousness:  2 - Fully awake  Oxygen Saturation (color):  2 - Able to maintain oxygen saturation >92% on room air    Sedation/Anesthesia Plan:  Guard the patient's safety and welfare. Minimize physical discomfort and pain. Minimize negative psychological responses to treatment by providing sedation and analgesia and maximize the potential amnesia. Patient to meet pre-procedure discharge plan.     Medication Planned:  midazolam intravenously and fentanyl intravenously    Patient is an appropriate candidate for plan of sedation:   yes      Electronically signed by Sanford Gilliland MD on 12/22/2021 at 9:07 AM

## 2021-12-23 VITALS
BODY MASS INDEX: 51.27 KG/M2 | OXYGEN SATURATION: 97 % | HEIGHT: 64 IN | HEART RATE: 81 BPM | SYSTOLIC BLOOD PRESSURE: 114 MMHG | DIASTOLIC BLOOD PRESSURE: 68 MMHG | WEIGHT: 300.3 LBS | TEMPERATURE: 98.7 F | RESPIRATION RATE: 20 BRPM

## 2021-12-23 PROBLEM — Z95.0 PACEMAKER: Status: ACTIVE | Noted: 2021-12-23

## 2021-12-23 LAB
ANION GAP SERPL CALCULATED.3IONS-SCNC: 9 MMOL/L (ref 3–16)
BUN BLDV-MCNC: 10 MG/DL (ref 7–20)
CALCIUM SERPL-MCNC: 8.5 MG/DL (ref 8.3–10.6)
CHLORIDE BLD-SCNC: 100 MMOL/L (ref 99–110)
CO2: 29 MMOL/L (ref 21–32)
CREAT SERPL-MCNC: 0.7 MG/DL (ref 0.9–1.3)
EKG ATRIAL RATE: 76 BPM
EKG ATRIAL RATE: 82 BPM
EKG DIAGNOSIS: NORMAL
EKG DIAGNOSIS: NORMAL
EKG P AXIS: 30 DEGREES
EKG P AXIS: 45 DEGREES
EKG P-R INTERVAL: 196 MS
EKG P-R INTERVAL: 200 MS
EKG Q-T INTERVAL: 340 MS
EKG Q-T INTERVAL: 364 MS
EKG QRS DURATION: 76 MS
EKG QRS DURATION: 76 MS
EKG QTC CALCULATION (BAZETT): 397 MS
EKG QTC CALCULATION (BAZETT): 409 MS
EKG R AXIS: -55 DEGREES
EKG R AXIS: 22 DEGREES
EKG T AXIS: 52 DEGREES
EKG T AXIS: 72 DEGREES
EKG VENTRICULAR RATE: 76 BPM
EKG VENTRICULAR RATE: 82 BPM
GFR AFRICAN AMERICAN: >60
GFR NON-AFRICAN AMERICAN: >60
GLUCOSE BLD-MCNC: 101 MG/DL (ref 70–99)
HCT VFR BLD CALC: 42.7 % (ref 40.5–52.5)
HEMOGLOBIN: 13.8 G/DL (ref 13.5–17.5)
MCH RBC QN AUTO: 27.6 PG (ref 26–34)
MCHC RBC AUTO-ENTMCNC: 32.4 G/DL (ref 31–36)
MCV RBC AUTO: 85.3 FL (ref 80–100)
PDW BLD-RTO: 16.3 % (ref 12.4–15.4)
PLATELET # BLD: 171 K/UL (ref 135–450)
PMV BLD AUTO: 7.9 FL (ref 5–10.5)
POTASSIUM SERPL-SCNC: 4.1 MMOL/L (ref 3.5–5.1)
RBC # BLD: 5.01 M/UL (ref 4.2–5.9)
SODIUM BLD-SCNC: 138 MMOL/L (ref 136–145)
WBC # BLD: 10.2 K/UL (ref 4–11)

## 2021-12-23 PROCEDURE — 93005 ELECTROCARDIOGRAM TRACING: CPT | Performed by: NURSE PRACTITIONER

## 2021-12-23 PROCEDURE — 36415 COLL VENOUS BLD VENIPUNCTURE: CPT

## 2021-12-23 PROCEDURE — 6370000000 HC RX 637 (ALT 250 FOR IP): Performed by: INTERNAL MEDICINE

## 2021-12-23 PROCEDURE — 93010 ELECTROCARDIOGRAM REPORT: CPT | Performed by: INTERNAL MEDICINE

## 2021-12-23 PROCEDURE — 2580000003 HC RX 258: Performed by: INTERNAL MEDICINE

## 2021-12-23 PROCEDURE — G0378 HOSPITAL OBSERVATION PER HR: HCPCS

## 2021-12-23 PROCEDURE — 96376 TX/PRO/DX INJ SAME DRUG ADON: CPT

## 2021-12-23 PROCEDURE — 94640 AIRWAY INHALATION TREATMENT: CPT

## 2021-12-23 PROCEDURE — 96374 THER/PROPH/DIAG INJ IV PUSH: CPT

## 2021-12-23 PROCEDURE — 99217 PR OBSERVATION CARE DISCHARGE MANAGEMENT: CPT | Performed by: NURSE PRACTITIONER

## 2021-12-23 PROCEDURE — 85027 COMPLETE CBC AUTOMATED: CPT

## 2021-12-23 PROCEDURE — 6360000002 HC RX W HCPCS: Performed by: INTERNAL MEDICINE

## 2021-12-23 PROCEDURE — 6370000000 HC RX 637 (ALT 250 FOR IP): Performed by: NURSE PRACTITIONER

## 2021-12-23 PROCEDURE — 80048 BASIC METABOLIC PNL TOTAL CA: CPT

## 2021-12-23 RX ORDER — ISOSORBIDE MONONITRATE 30 MG/1
30 TABLET, EXTENDED RELEASE ORAL DAILY
Status: DISCONTINUED | OUTPATIENT
Start: 2021-12-23 | End: 2021-12-23 | Stop reason: HOSPADM

## 2021-12-23 RX ORDER — ISOSORBIDE MONONITRATE 30 MG/1
30 TABLET, EXTENDED RELEASE ORAL DAILY
Qty: 30 TABLET | Refills: 3 | Status: SHIPPED | OUTPATIENT
Start: 2021-12-24 | End: 2022-01-06

## 2021-12-23 RX ORDER — ONDANSETRON 4 MG/1
4 TABLET, ORALLY DISINTEGRATING ORAL EVERY 8 HOURS PRN
Status: DISCONTINUED | OUTPATIENT
Start: 2021-12-23 | End: 2021-12-23 | Stop reason: HOSPADM

## 2021-12-23 RX ADMIN — Medication 10 ML: at 08:58

## 2021-12-23 RX ADMIN — DULOXETINE HYDROCHLORIDE 60 MG: 60 CAPSULE, DELAYED RELEASE ORAL at 08:48

## 2021-12-23 RX ADMIN — Medication 10 ML: at 02:08

## 2021-12-23 RX ADMIN — Medication 2 PUFF: at 08:30

## 2021-12-23 RX ADMIN — Medication 10 ML: at 13:03

## 2021-12-23 RX ADMIN — METOPROLOL SUCCINATE 25 MG: 25 TABLET, EXTENDED RELEASE ORAL at 08:48

## 2021-12-23 RX ADMIN — MORPHINE SULFATE 2 MG: 4 INJECTION, SOLUTION INTRAMUSCULAR; INTRAVENOUS at 13:03

## 2021-12-23 RX ADMIN — TIOTROPIUM BROMIDE INHALATION SPRAY 2 PUFF: 3.12 SPRAY, METERED RESPIRATORY (INHALATION) at 08:29

## 2021-12-23 RX ADMIN — TRAZODONE HYDROCHLORIDE 100 MG: 50 TABLET ORAL at 08:58

## 2021-12-23 RX ADMIN — CLOPIDOGREL BISULFATE 75 MG: 75 TABLET ORAL at 08:48

## 2021-12-23 RX ADMIN — MORPHINE SULFATE 2 MG: 4 INJECTION, SOLUTION INTRAMUSCULAR; INTRAVENOUS at 02:08

## 2021-12-23 RX ADMIN — MORPHINE SULFATE 2 MG: 4 INJECTION, SOLUTION INTRAMUSCULAR; INTRAVENOUS at 08:58

## 2021-12-23 RX ADMIN — ONDANSETRON 4 MG: 4 TABLET, ORALLY DISINTEGRATING ORAL at 11:11

## 2021-12-23 RX ADMIN — ISOSORBIDE MONONITRATE 30 MG: 30 TABLET, EXTENDED RELEASE ORAL at 11:11

## 2021-12-23 RX ADMIN — ASPIRIN 81 MG: 81 TABLET, COATED ORAL at 08:48

## 2021-12-23 RX ADMIN — Medication 1 TABLET: at 08:48

## 2021-12-23 ASSESSMENT — PAIN DESCRIPTION - DESCRIPTORS
DESCRIPTORS: SHARP;STABBING
DESCRIPTORS: ACHING;DISCOMFORT
DESCRIPTORS: ACHING;CONSTANT
DESCRIPTORS: SHARP;STABBING
DESCRIPTORS: ACHING;DISCOMFORT

## 2021-12-23 ASSESSMENT — PAIN DESCRIPTION - LOCATION
LOCATION: BACK;CHEST
LOCATION: BACK;CHEST
LOCATION: BACK

## 2021-12-23 ASSESSMENT — PAIN - FUNCTIONAL ASSESSMENT
PAIN_FUNCTIONAL_ASSESSMENT: ACTIVITIES ARE NOT PREVENTED

## 2021-12-23 ASSESSMENT — PAIN DESCRIPTION - FREQUENCY
FREQUENCY: CONTINUOUS

## 2021-12-23 ASSESSMENT — PAIN DESCRIPTION - ONSET
ONSET: ON-GOING

## 2021-12-23 ASSESSMENT — PAIN SCALES - GENERAL
PAINLEVEL_OUTOF10: 6
PAINLEVEL_OUTOF10: 5
PAINLEVEL_OUTOF10: 5
PAINLEVEL_OUTOF10: 4
PAINLEVEL_OUTOF10: 6
PAINLEVEL_OUTOF10: 0

## 2021-12-23 ASSESSMENT — PAIN DESCRIPTION - PAIN TYPE
TYPE: ACUTE PAIN;CHRONIC PAIN
TYPE: ACUTE PAIN
TYPE: ACUTE PAIN
TYPE: ACUTE PAIN;CHRONIC PAIN
TYPE: CHRONIC PAIN

## 2021-12-23 NOTE — PROGRESS NOTES
AVS reviewed with pt and son at bedside. Pt verbalizes understanding of medication regimen, importance of keeping future appointments, condition education provided, and when to call his physician. New medications picked up from outpatient pharmacy. Patient taken to son's vehicle via wheelchair with writer/dischargeing RN.

## 2021-12-23 NOTE — PROGRESS NOTES
12/22/21 1938   RT Protocol   History Pulmonary Disease 1   Respiratory pattern 0   Breath sounds 2   Cough 0   Indications for Bronchodilator Therapy On home bronchodilators   Bronchodilator Assessment Score 3   home reg

## 2021-12-23 NOTE — PROGRESS NOTES
Pt was c.diff r/o in 08/2021. Pt didn't come in with diarrhea and doesn't have diarrhea. C.diff isolation d/c'd according to protocol.

## 2021-12-23 NOTE — DISCHARGE SUMMARY
[address]  Discharge Summary  Patient ID:  Arya Bashir  [id number] 51 y.o. 1960    Admit date: 12/22/2021    Discharge date: 12/23/21     Admitting Provider: Sanford Gilliland MD     Discharge Provider: ANICETO Rossi - CNP     Admission Diagnoses: Abnormal result of other cardiovascular function study [R94.39]  ASHD (arteriosclerotic heart disease) [I25.10]    Discharge Diagnoses: Active Hospital Problems    Pacemaker      ASHD (arteriosclerotic heart disease)      CHB (complete heart block) ([facility])      Mobitz type I Wenckebach atrioventricular block      Ischemic cardiomyopathy      Chronic combined systolic and diastolic congestive heart failure (HCC)      S/P three vessel coronary artery bypass      Class 3 severe obesity with body mass index (BMI) of 45.0 to 49.9 in adult ([facility])      OANH (obstructive sleep apnea)      Chronic obstructive pulmonary disease ([facility]            PFT done 9/03/19      Discharged Condition: fair  The patient was seen and examined on day of discharge and this discharge summary is in conjunction with any daily progress note from day of discharge. Hospital Course: Arya Bashir was admitted following left heart catheterization with PCI to the first diagonal branch as well as PCI to the RCA with shockwave lithotripsy, atherectomy and placement of ISREAL. He has a history of CAD with prior CABG followed by PCI to the left main as well as diagonal branch, ischemic cardiomyopathy, systolic CHF, SND s/p dual PPM, syncope, OANH, COPD and obesity. Reported symptoms of shortness of breath as well as some chest discomfort underwent stress test earlier this month which was abnormal.      Today he continues to have symptoms of dyspnea as well as a chest pain/aching sensation. This was worse yesterday and is improved today though is persistent. He is also complaining of back pain, neck pain and nausea.    Reviewed status symptoms and plan of care with Dr. Kesha Holt and  Loraine Aguilar. We have added long-acting nitroglycerin (Imdur) for CAD and angina. He will continue on aspirin, Plavix and high-dose statin as well as evidence-based beta-blocker Toprol-XL. He has follow-up scheduled in 7 to 10 days with our office as well as a consultation with Dr. Charly Brito at the [facility] for second opinion for . Consults:  IP CONSULT TO CARDIAC REHAB  Physical Exam:  /78   Pulse 77   Temp 97.7 °F (36.5 °C) (Oral)   Resp 20   Ht 5' 4\" (1.626 m)   Wt (!) 300 lb 4.8 oz (136.2 kg)   SpO2 94%   BMI 51.55 kg/m²       Intake/Output Summary (Last 24 hours) at 12/23/2021 1141  Last data filed at 12/23/2021 1116  Gross per 24 hour   Intake 360 ml   Output 1600 ml   Net -1240 ml     General:  Awake, alert, NAD  Skin:  Warm and dry  Neck:  JVD difficult to assess  Chest:  Clear to auscultation anteriorly, no wheezes/rhonchi/rales  Cardiovascular:  RRR S1S2, no m/g/r  Abdomen:  Soft, nontender, +bowel sounds  Extremities:  1+ pretibial non-pitting BLE edema    Significant Diagnostic Studies:   CARDIAC CATH/ PCI 12/22/2021:   INDICATION   Intermediate risk abnormal stress test   PROCEDURES PERFORMED   Left heart catheterization  LVgram  Aortogram  Coronary angiogam  Coronary cath  LIMA angiogram  Monitoring of moderate conscious sedation     IVUS of D1  PCI of D1 with single drug-eluting stent     IVUS of RCA  Rotational atherectomy of RCA  Shockwave coronary lithotripsy of RCA  PCI of RCA with single drug-eluting sten   PROCEDURE DESCRIPTION   Risks/benefits/alternatives/outcomes were discussed with patient and/or family and informed consent was obtained. Using the [facility] scale, the patient's right radial artery was found to be a level B. Patient was prepped draped in the usual sterile fashion. Local anaesthetic was applied over puncture sites.   Using ultrasound guidance, unsuccessful attempt was made at right radial artery cannulation, and as such using ultrasound and fluoroscopic technique, attention turned towards the right groin and a 5 Paraguayan sheath was inserted into the right femoral artery. Diagnostic 5 Paraguayan pigtail, JL 4.5, 3 DRC catheters used for diagnostic angiography. Attention then turned towards PCI as noted below. At end of procedure, the interventional sheath was sutured in place for later removal.    There were no immediate complications. I supervised sedation from 9:15 AM to 11:45 AM with versed 9 mg/fentanyl 300 mcg during the procedure. An independent trained observer pushed meds at my direction. We monitored the patient's level of consciousness and vital signs/physiologic status throughout the procedure duration (see times listed previously). 285 cc contrast was utilized. <20cc EBL. FINDINGS     LVGRAM   LVEDP  10   GRADIENT ACROSS AORTIC VALVE  none   LV FUNCTION EF 55-60%   WALL MOTION Subtle mid inferior hypokinesis. MITRAL REGURGITATION  mild      AORTAGRAM  CALIBER  mildly increased in size   AORTIC INSUFFICIENCY  minimal   BYPASS GRAFTS  no bypass grafts visualized     CORONARY ARTERIES   LM Ffguszop-goy-xilmmx less than 10% stenosis, there is a stent noted in the mid to distal left main that is widely patent. LAD Proximal-mid 80% stenosis, there is mid-distal nonvisualization of vessel due to bidirectional flow from the LIMA graft. LCX  Calcified, ostial 85% stenosis, proximal-mid 20% stenosis. OM 1 has 100% ryukcsgs-sjz-jubirc . There are well-developed right to left collaterals as well as some left to left collaterals. RCA Large vessel, dominant, calcified, mild to moderately tortuous, proximal-mid 75% stenosis. Distal 50 to 60% stenosis. PDA is a medium to large size vessel with proximal-mid 60-70% stenosis. BYPASS GRAFTS   LIMA-LAD  widely patent, there is less than 10% proximal-mid stenosis, however distally at the anastomosis there is a 60% stenosis.          SVG-D1  known to be occluded, not injected SVG-OM  known to be occluded, not injected           PERCUTANEOUS INTERVENTION DESCRIPTION      Bivalirudin was used for anticoagulation, at the end of the procedure, patient was given oral loading dose of Plavix as well as a single bolus of Integrilin. The initial 5 Martiniquais groin sheath was upsized to a 6 Western Janis Terumo 45 cm destination sheath. A 6 Martiniquais VL 3.5 guiding cath was used to intubate the left main. A run-through wire was used to cross the lesions in D1 and the lesions of D1 were assessed with IVUS which showed severe in-stent restenosis with a minimal luminal diameter of 2.75 to 3 mm. As such lesions were treated with a 2.75 mm cutting balloon as well as a 3 mm noncompliant balloon and then were stented with a Medtronic resolute [place] 2.75 x 18 mm drug-eluting stent. Stent was postdilated with 3 mm noncompliant balloon. Follow-up IVUS showed good stent apposition/expansion, there was no residual dissection/thrombus noted. As such, attention turned towards the RCA and the initial 6 Martiniquais Terumo destination sheath was upsized to a 7 Western Janis 45 cm Terumo destination sheath in the right groin. Then a [address] guiding cath was used to intubate the RCA. A run-through wire was used to cross the lesions in the RCA and IVUS was performed of the RCA which showed extensive calcification in the proximal-mid vessel with 75% stenoses. As such a Rotafloppy wire was taken and that was used to cross the lesions and the run-through wire was removed. Rotational atherectomy was then performed with a 1.5 mm angela. Shockwave coronary lithotripsy was then performed with a 4 mm balloon. Lesion was then stented with assistance of 7 Adzerk guide extension catheter with a Medtronic resolute Kentrell 4 x 34 mm drug-eluting stent and stent was postdilated with a 4 mm noncompliant balloon and additionally in the proximal segment with a 5 mm noncompliant balloon. Follow-up IVUS showed good stent apposition/expansion. There was no residual dissection/thrombus noted. There was mild to moderate disease noted in the mid-distal vessel beyond the stented segment and this was felt to be best treated medically. During the procedure, patient was restless and required redirection as well as increased sedation. Groin site remained stable throughout the case, overall, hemodynamics and EKG were stable although during the procedure he did have to be supported with phenylephrine but this was ultimately able to be weaned at the end of the procedure. CONCLUSIONS:   Successful PCI of D1 with single drug-eluting stent   Successful rotational atherectomy of RCA  Successful shockwave coronary lithotripsy of RCA  Successful PCI of RCA with single drug-eluting stent   Consider staged PCI of LIMA to LAD   Consider referral to [facility] Dr Yessi Delatorre for consideration of LCx  PCI       Stress test 12/1/21:   Summary  Abnormal moderate risk myocardial perfusion study. There is a large sized lateral defect consistent with ischemia. The left ventricular size and function are normal.  The estimated left ventricular function is 60%. Stress Protocols   Resting ECG  Normal sinus rhythm; left axis; cannot  r/o prior inferior and anterolateral  infarct; low voltage; nonspecific ST  change      ECHO 12/1/21:   Summary   Technically difficult examination secondary to habitus. LV systolic function is normal with EF estimated at 55%. Endocardium not entirely well visualized but no obvious segmental wall motion abnormalities. There is mild concentric left ventricular hypertrophy. Normal diastolic function with normal LV filling pressure. Mild mitral annular calcification. Aortic valve appears sclerotic but opens adequately. Individual aortic valve leaflets are not clearly visualized. Cannot r/o bicuspid valve. ECHO 3/12/21  Summary   Limited echo for left ventricle systolic function. Definity is used for myocardial border enhancement. LV systolic function is mildly reduced with a visually estimated EF=45-50%. The left ventricle is normal in size with normal wall thickness. More prominent Inferior HK on certain views. Indeterminate diastolic function.            Labs:   Lab Results   Component Value Date    CREATININE 0.7 (L) 12/23/2021    BUN 10 12/23/2021     12/23/2021    K 4.1 12/23/2021     12/23/2021    CO2 29 12/23/2021      Lab Results   Component Value Date    WBC 10.2 12/23/2021    HGB 13.8 12/23/2021    HCT 42.7 12/23/2021    MCV 85.3 12/23/2021     12/23/2021      Lab Results   Component Value Date    INR 1.10 10/19/2020    PROTIME 12.7 10/19/2020    No results found for: BNP    Radiology: N/A    Treatments: analgesia: Fentanyl and Versed, cardiac meds: metoprolol and atorvastatin and anticoagulation: ASA and Plavix    Disposition: home    Patient Instructions:      Medication List      START taking these medications    isosorbide mononitrate 30 MG extended release tablet  Commonly known as: IMDUR  Take 1 tablet by mouth daily  Start taking on: December 24, 2021        CONTINUE taking these medications    * albuterol sulfate  (90 Base) MCG/ACT inhaler  Commonly known as: Proventil HFA  Inhale 2 puffs into the lungs every 6 hours as needed for Wheezing     * albuterol (2.5 MG/3ML) 0.083% nebulizer solution  Commonly known as: PROVENTIL  Take 3 mLs by nebulization every 6 hours as needed for Wheezing or Shortness of Breath     aspirin 81 MG EC tablet  Take 1 tablet by mouth daily     atorvastatin 80 MG tablet  Commonly known as: LIPITOR  Take 1 tablet by mouth nightly     benzonatate 200 MG capsule  Commonly known as: TESSALON  TAKE 1 CAPSULE BY MOUTH THREE TIMES A DAY AS NEEDED FOR COUGH     clopidogrel 75 MG tablet  Commonly known as: PLAVIX  Take 1 tablet by mouth daily     DULoxetine 60 MG extended release capsule  Commonly known as: Cymbalta  Take 1 capsule by mouth daily     furosemide 40 MG tablet  Commonly known as: LASIX  Take 1 tablet by mouth as needed (for weight grater than 300lbs)     metoprolol succinate 25 MG extended release tablet  Commonly known as: Toprol XL  Take 1 tablet by mouth daily     nitroGLYCERIN 0.4 MG SL tablet  Commonly known as: Nitrostat  Place 1 tablet under the tongue every 5 minutes as needed for Chest pain     OXYGEN     therapeutic multivitamin-minerals tablet     traZODone 100 MG tablet  Commonly known as: DESYREL  Take 1 tablet by mouth 2 times daily Am pm     Trelegy Ellipta 100-62.5-25 MCG/INH Aepb  Generic drug: fluticasone-umeclidin-vilant  Inhale 1 puff into the lungs daily     Vitamin D3 1.25 MG (91077 UT) Caps  1 po a week         * This list has 2 medication(s) that are the same as other medications prescribed for you. Read the directions carefully, and ask your doctor or other care provider to review them with you. Where to Get Your Medications      These medications were sent to [address], [facility] [address] [phone] - F [phone]  [address] [facility]    Phone: [phone]   · isosorbide mononitrate 30 MG extended release tablet       Activity: no lifting or strenuous exercise for 1 week, no driving for today   Diet: cardiac diet  Wound Care: keep wound clean and dry    Follow-up with John Santos CNP in 2 weeks.     Signed:  ANICETO Mujica CNP, 12/23/2021, 11:41 AM

## 2021-12-23 NOTE — PROGRESS NOTES
Completed Cardiac Medical Opinion papers faxed to Madhuri Bassett and Pilar Dempsey per patient request [phone].

## 2021-12-24 ENCOUNTER — APPOINTMENT (OUTPATIENT)
Dept: CARDIAC REHAB | Age: 61
End: 2021-12-24
Payer: COMMERCIAL

## 2021-12-27 ENCOUNTER — TELEPHONE (OUTPATIENT)
Dept: FAMILY MEDICINE CLINIC | Age: 61
End: 2021-12-27

## 2021-12-27 ENCOUNTER — APPOINTMENT (OUTPATIENT)
Dept: CARDIAC REHAB | Age: 61
End: 2021-12-27
Payer: COMMERCIAL

## 2021-12-27 NOTE — TELEPHONE ENCOUNTER
[facility] Transitions Initial Follow Up Call    Outreach made within 2 business days of discharge: Yes    Patient: Shy Wilson Patient : 1960   MRN: [id number]  Reason for Admission: There are no discharge diagnoses documented for the most recent discharge. Discharge Date: 21       Spoke with: patient Richie Arzate    Discharge department/facility: [facility] Interactive Patient Contact:  Was patient able to fill all prescriptions: Yes  Was patient instructed to bring all medications to the follow-up visit: Yes  Is patient taking all medications as directed in the discharge summary?  Yes  Does patient understand their discharge instructions: YES  Does patient have questions or concerns that need addressed prior to 7-14 day follow up office visit: no    Scheduled appointment with PCP within 7-14 days    Follow Up  Future Appointments   Date Time Provider Riri Jara   2021 10:00 AM [address] [place] [address] [facility]   2021  8:45 AM Kim Christina MD [facility]   2022  8:00 AM [facility]   2022  9:15 AM Venancio Mckeon, APRN - CNP [facility] CLER CAR [facility]   2022  7:05 AM SCHEDULE, George Leal [facility]   2022 11:20 AM ANICETO Wagner - CNP [facility]   2/10/2022  3:00 PM ANICETO Wagner CNP, [facility]   2022  7:05 AM SCHEDULE, George Maria Elena Leal [facility]   2022  1:30 PM SCHEDULE, [facility]   2022  1:30 PM Irina [facility], APRN - ROXANA Alarcon [facility]   2022 12:45 PM Ariana Schwab MD P CLER CAR [facility]   2022  7:05 AM SCHEDULE, George Rash REMOTE TRANSMISSION Nakia Alarcon [facility]       Claude Raspberry, RN Adequate: hears normal conversation without difficulty

## 2021-12-29 ENCOUNTER — APPOINTMENT (OUTPATIENT)
Dept: CARDIAC REHAB | Age: 61
End: 2021-12-29
Payer: COMMERCIAL

## 2021-12-30 ENCOUNTER — OFFICE VISIT (OUTPATIENT)
Dept: PULMONOLOGY | Age: 61
End: 2021-12-30
Payer: COMMERCIAL

## 2021-12-30 ENCOUNTER — TELEPHONE (OUTPATIENT)
Dept: PULMONOLOGY | Age: 61
End: 2021-12-30

## 2021-12-30 VITALS
SYSTOLIC BLOOD PRESSURE: 142 MMHG | OXYGEN SATURATION: 92 % | RESPIRATION RATE: 23 BRPM | DIASTOLIC BLOOD PRESSURE: 73 MMHG | TEMPERATURE: 97.6 F | WEIGHT: 301 LBS | HEART RATE: 92 BPM | HEIGHT: 64 IN | BODY MASS INDEX: 51.39 KG/M2

## 2021-12-30 DIAGNOSIS — J44.9 MODERATE COPD (CHRONIC OBSTRUCTIVE PULMONARY DISEASE) (HCC): Primary | ICD-10-CM

## 2021-12-30 DIAGNOSIS — R06.09 DYSPNEA ON EXERTION: ICD-10-CM

## 2021-12-30 DIAGNOSIS — G47.33 SEVERE OBSTRUCTIVE SLEEP APNEA: ICD-10-CM

## 2021-12-30 DIAGNOSIS — Z87.891 HISTORY OF TOBACCO USE: ICD-10-CM

## 2021-12-30 PROCEDURE — 1036F TOBACCO NON-USER: CPT | Performed by: INTERNAL MEDICINE

## 2021-12-30 PROCEDURE — G8482 FLU IMMUNIZE ORDER/ADMIN: HCPCS | Performed by: INTERNAL MEDICINE

## 2021-12-30 PROCEDURE — 3017F COLORECTAL CA SCREEN DOC REV: CPT | Performed by: INTERNAL MEDICINE

## 2021-12-30 PROCEDURE — G8417 CALC BMI ABV UP PARAM F/U: HCPCS | Performed by: INTERNAL MEDICINE

## 2021-12-30 PROCEDURE — 99214 OFFICE O/P EST MOD 30 MIN: CPT | Performed by: INTERNAL MEDICINE

## 2021-12-30 PROCEDURE — G8427 DOCREV CUR MEDS BY ELIG CLIN: HCPCS | Performed by: INTERNAL MEDICINE

## 2021-12-30 PROCEDURE — 3023F SPIROM DOC REV: CPT | Performed by: INTERNAL MEDICINE

## 2021-12-30 PROCEDURE — G8926 SPIRO NO PERF OR DOC: HCPCS | Performed by: INTERNAL MEDICINE

## 2021-12-30 ASSESSMENT — SLEEP AND FATIGUE QUESTIONNAIRES
HOW LIKELY ARE YOU TO NOD OFF OR FALL ASLEEP WHILE SITTING AND TALKING TO SOMEONE: 3
HOW LIKELY ARE YOU TO NOD OFF OR FALL ASLEEP IN A CAR, WHILE STOPPED FOR A FEW MINUTES IN TRAFFIC: 0
ESS TOTAL SCORE: 21
HOW LIKELY ARE YOU TO NOD OFF OR FALL ASLEEP WHILE SITTING AND READING: 3
HOW LIKELY ARE YOU TO NOD OFF OR FALL ASLEEP WHEN YOU ARE A PASSENGER IN A CAR FOR AN HOUR WITHOUT A BREAK: 3
HOW LIKELY ARE YOU TO NOD OFF OR FALL ASLEEP WHILE LYING DOWN TO REST IN THE AFTERNOON WHEN CIRCUMSTANCES PERMIT: 3
HOW LIKELY ARE YOU TO NOD OFF OR FALL ASLEEP WHILE SITTING QUIETLY AFTER LUNCH WITHOUT ALCOHOL: 3
HOW LIKELY ARE YOU TO NOD OFF OR FALL ASLEEP WHILE WATCHING TV: 3
HOW LIKELY ARE YOU TO NOD OFF OR FALL ASLEEP WHILE SITTING INACTIVE IN A PUBLIC PLACE: 3
NECK CIRCUMFERENCE (INCHES): 20

## 2021-12-30 NOTE — PATIENT INSTRUCTIONS
Remember to bring all pulmonary medications to your next appointment with the office. Please keep all of your future appointments scheduled by [address], [facility] office. Out of respect for other patients and providers, you may be asked to reschedule your appointment if you arrive later than your scheduled appointment time. Appointments cancelled less than 24hrs in advance will be considered a no show. Patients with three missed appointments within 1 year or four missed appointments within 2 years can be dismissed from the practice. Sleep Hygiene. .. Tips for better sleep. .. Avoid naps. This will ensure you are sleepy at bedtime. If you have to take a nap, sleep less than 1 hour, before 3 pm.  Sleep only when sleepy; this reduces the time you are awake in bed. Regular exercise is recommended to help you deepen your sleep, but not within 4-6 hours of your bedtime. Timing of exercise is important, aim to exercise early in the morning or early afternoon. A light snack may help you fall asleep. Warm milk and foods high in the amino acid tryptophan, such as bananas, may help you to sleep  Be sure to avoid heavy, spicy or sugary foods 4-6 hours before bedtime and avoid at snack time. Stay away from stimulants such as caffeine and nicotine for at least 4-6 hours before bed. Stimulants can interfere with your ability to fall asleep. Caffeine is found in tea, cola, coffee, cocoa and chocolate and is best avoided at bedtime. Nicotine is found in tobacco products. Avoid alcohol 4-6 hours before bedtime. Alcohol has an immediate sleep-inducing effect, after a few hours when alcohol levels fall there is a stimulant or wake-up effect and will cause fragmented sleep. Sleep rituals are important. Give your body clues it is time to slow down and sleep. Examples include; yoga, deep breathing, listen to relaxing music, a hot bath or a few minutes of reading.   Have a fixed bedtime and awakening time, Even on weekends! You will feel better keeping a regular sleep cycle, even if you are retired or not working. Get into your favorite sleep position. If not asleep in 30 minutes, get up and do something boring until you feel sleepy. Remember not to expose yourself to bright lights such as TV, phone or tablet screens. Only use your bed for sleeping. Do not use your bed as an office, workroom or recreation room. Use comfortable bedding. Uncomfortable bedding can prevent good sleep. Ensure your bedroom is quiet and comfortable. A cooler room along with enough blankets to stay warm is recommended. If your room is too noisy, try a white noise machine. If too bright, try black out shades or an eye mask. Dont take worries to bed. Leave worries about work, school etc. behind you when you go to bed. Some people find it helpful to assign a worry period in the evening or late afternoon to write down your worries and get them out of your system. CPAP Equipment Cleaning and Disinfecting Schedule  Equipment Cleaning Frequency Instructions  Disinfecting Frequency   Non-Disposable Filters  Weekly Mild soapy water, Rinse, Air Dry Not Required   Disposable Filters Change as needed  2-4 weeks Do Not Wash Not Required   Hose/tubing Daily Mild soapy water, Rinse, Air Dry Once a week   Mask / Nasal Pillows Daily Mild soapy water, Rinse, Air Dry Once a week   Headgear Weekly Hand wash, Mild soapy water, Rinse, Dry  Not Required   Humidifier Daily Empty water daily  Mild soapy water, Rinse well, Air Dry  Once a week   CPAP Unit As Needed Dust with damp cloth,  No detergents or sprays Not Required         Disinfect (per schedule) with 1 part white vinegar and 3 parts water- soak mask and water chamber for 30 minutes every 1-2 weeks, more often if sick. Allow water/vinegar mixture to run through tubing. Allow all equipment to air dry.    Drying Hints:   Always hang tubing away from direct sunlight, as this will cause the tubing to become yellow, brittle and crack over a period of time. DO NOT attach the wet tubing to your CPAP unit to blow-dry it. The moisture from the tubing can drain back into your machine. Moisture in your unit can cause sudden pressure increases or short circuits  DO's and DON'Ts:  - Don't use alcohol-based products to clean your mask, because it can cause the materials to become hard and brittle. - Don't put headgear in the washer or dryer  - Don't use any caustic or household cleaning solutions such as bleach on your CPAP   equipment.  - Do follow the recommended cleaning schedule. - Do change your disposable filter frequently. Adapted From: [employer].Risk Ident/cleaning. shtm.   These are general suggestions for all models please follow specific s recommendations and specific instructions

## 2021-12-30 NOTE — PROGRESS NOTES
[facility]                                                                CHIEF COMPLAINT: Follow-up sleep studies      HPI:   HST on BiPAP titration were reviewed by me and noted below. Results were dicussed with patient and multiple good questions were answered. Patient had L heart cath and 2 stents were placed   Started BiPAP and helping a lot. Feels better when he uses it. Sleeping better. Goes to bed 9-10 pm and gets up 4-5 am. Sleep onset is 30-40 min. Takes 1 nap for 1 hr a day   Patient is compliant with inhaled bronchodilators  Trelegy is helping a lot   Some cough with lightgreen   No hemoptysis   No smoking       From prior visit:   64years old with chest pain being followed by Dr. Burnis Osler here for sleep apnea evaluation and shortness of breath. Wakes up at night choking and gasping for air for 3.5 years, severe at times. Associated with observed sleep apnea and hypersomnia. No snoring. Better when he sits up and worse when laying down. No restorative sleep. + dry mouth upon awakening. Patient is complaining of daytime sleepiness, fatigue and tiredness at times during the day. Bedtime 8-10 pm and rise time is 5:30 am. Sleep onset 60 minutes. Watches TV in bedroom. + morning headache. 1 nocturia. Wakes up 4-5 times at night. 2-3 nap/weekf or 30-45 min. No car wrecks or near wrecks because of the sleepiness. No nodding off while driving. LEE for 3.5 years, mild. Dyspnea worse with exertion and better with resting. Albuterol helps his breathing. Uses 2-3 times/day. Associated with cough and wheezes. Able to walk mail box and back.  Smoker 1 ppd for the past 45 years- smokes now 1.5 ppd     Old records reviewed by me and [id number]        Past Medical History:   Diagnosis Date    Acute diverticulitis 04/10/2021    Acute kidney injury ([address] 04/08/2021    Acute on chronic respiratory failure with hypoxemia (HCC)     Acute respiratory failure ([address] 08/19/2020    Acute respiratory failure with hypoxia ([address]     Anxiety 01/06/2020    Aortic valve calcification 09/2020    seen on lung CT    CAD in native artery 04/14/2021    Chronic obstructive pulmonary disease ([address] 09/03/2019    PFT done 9/03/19    Chronic systolic congestive heart failure ([address] 04/10/2021    Class 3 severe obesity with body mass index (BMI) of 45.0 to 49.9 in adult [facility])     Coronary artery calcification 09/2020    seen on lung ct    Coronary artery disease involving native heart with angina pectoris ([address] 09/22/2020    Crush injury of right foot 07/23/2015    DDD (degenerative disc disease), lumbar 01/06/2020    Depression 01/06/2020    Diverticulitis of colon 04/10/2021    Erectile dysfunction 01/06/2020    Fatigue 01/06/2020    HNP (herniated nucleus pulposus), lumbar 01/06/2020    Hyperglycemia 01/06/2020    Hyperlipidemia     Hypersomnia 01/06/2020    Hypertension     Insomnia     Ischemic cardiomyopathy     Kidney stone     Lumbar radiculopathy 01/06/2020    Lumbar spine pain 01/06/2020    LVH (left ventricular hypertrophy)     seen on echo 8/2020, moderate    Mobitz type I Wenckebach atrioventricular block 04/10/2021    Moderate protein-calorie malnutrition ([address] 03/17/2020    Observed sleep apnea 01/06/2020    OANH (obstructive sleep apnea) 01/06/2020    no C-pap is getting tested    Pneumonia, primary atypical     Reactive depression 01/06/2020    S/P three vessel coronary artery bypass 10/26/2020    Spondylosis without myelopathy or radiculopathy, lumbar region 01/06/2020    Tobacco abuse 01/06/2020    Tobacco use 01/06/2020    Wears dentures     full set       Past Surgical History:        Procedure Laterality Date    ANKLE SURGERY Left 8/2/2021    LEFT FOOT SCREW REMOVAL performed by Lluvia Ferrara MD at [place]  03/2021    CORONARY ARTERY BYPASS GRAFT N/A 9/25/2020    CORONARY ARTERY BYPASS GRAFTING X3, INTERNAL MAMMARY ARTERY, SAPHENOUS VEIN GRAFT, ON PUMP, STERNAL PLATING, 5 LEVEL BILATERAL INTERCOSTAL NERVE BLOCK, PLATELET GEL APPLICATION performed by Manuel Armenta MD at [facility]  03/16/2011    cystoscopy left ureteroscopy, left retrograde, double J stent placement    FINGER AMPUTATION Right 2/2/2021    RIGHT MIDDLE FINGER IRRIGATION AND DEBRIDEMENT WITH REVISION AMPUTATION AND BONE SHORTENING, POSSIBLE NAIL ABLATION performed by Vesta Howell MD at [facility] Right 12/24/2019    RIGHT LUMBAR FIVE SACRAL ONE TRANSFORAMINAL EPIDURAL STEROID INJECTION SITE CONFIRMED BY FLUOROSCOPY performed by Heather Jett MD at [facility] Right 1/7/2020    RIGHT LUMBAR FOUR AND LUMBAR FIVE TRANSFORAMINAL EPIDURAL STEROID INJECTION SITE CONFIRMED BY FLUOROSCOPY performed by Heather Jett MD at [place] [address]       Allergies:  is allergic to dilaudid [hydromorphone hcl] and codeine. Social History:    TOBACCO:   reports that he quit smoking about 22 months ago. His smoking use included cigarettes. He has a 70.00 pack-year smoking history. He has never used smokeless tobacco.  ETOH:   reports no history of alcohol use.       Family History:       Problem Relation Age of Onset    Heart Disease Mother     Other Mother         copd    High Blood Pressure Sister     No Known Problems Sister        Current Medications:    Current Outpatient Medications:     isosorbide mononitrate (IMDUR) 30 MG extended release tablet, Take 1 tablet by mouth daily, Disp: 30 tablet, Rfl: 3    Multiple Vitamins-Minerals (THERAPEUTIC MULTIVITAMIN-MINERALS) tablet, Take 1 tablet by mouth daily, Disp: , Rfl:     atorvastatin (LIPITOR) 80 MG tablet, Take 1 tablet by mouth nightly, Disp: 90 tablet, Rfl: 3    furosemide (LASIX) 40 MG tablet, Take 1 tablet by mouth as needed (for weight grater than 300lbs), Disp: 45 tablet, Rfl: 3    traZODone (DESYREL) 100 MG tablet, Take 1 tablet by mouth 2 times daily Am pm, Disp: 60 tablet, Rfl: 5    benzonatate (TESSALON) 200 MG capsule, TAKE 1 CAPSULE BY MOUTH THREE TIMES A DAY AS NEEDED FOR COUGH, Disp: 30 capsule, Rfl: 1    DULoxetine (CYMBALTA) 60 MG extended release capsule, Take 1 capsule by mouth daily, Disp: 30 capsule, Rfl: 1    fluticasone-umeclidin-vilant (TRELEGY ELLIPTA) 100-62.5-25 MCG/INH AEPB, Inhale 1 puff into the lungs daily, Disp: 60 each, Rfl: 5    albuterol (PROVENTIL) (2.5 MG/3ML) 0.083% nebulizer solution, Take 3 mLs by nebulization every 6 hours as needed for Wheezing or Shortness of Breath, Disp: 360 each, Rfl: 3    OXYGEN, Inhale 2 L into the lungs as needed , Disp: , Rfl:     nitroGLYCERIN (NITROSTAT) 0.4 MG SL tablet, Place 1 tablet under the tongue every 5 minutes as needed for Chest pain, Disp: 25 tablet, Rfl: 3    clopidogrel (PLAVIX) 75 MG tablet, Take 1 tablet by mouth daily, Disp: 90 tablet, Rfl: 3    aspirin 81 MG EC tablet, Take 1 tablet by mouth daily, Disp: 30 tablet, Rfl: 0    albuterol sulfate HFA (PROVENTIL HFA) 108 (90 Base) MCG/ACT inhaler, Inhale 2 puffs into the lungs every 6 hours as needed for Wheezing, Disp: 1 Inhaler, Rfl: 0    Cholecalciferol (VITAMIN D3) 1.25 MG (23203 UT) CAPS, 1 po a week, Disp: 4 capsule, Rfl: 5    metoprolol succinate (TOPROL XL) 25 MG extended release tablet, Take 1 tablet by mouth daily (Patient not taking: Reported on 12/30/2021), Disp: 30 tablet, Rfl: 11      Objective:   PHYSICAL EXAM:    BP (!) 142/73   Pulse 92   Temp 97.6 °F (36.4 °C)   Resp 23   Ht 5' 4\" (1.626 m)   Wt (!) 301 lb (136.5 kg)   SpO2 92% Comment: RA  BMI 51.67 kg/m²  on  RA   Gen: No distress. Eyes: PERRL. No sclera icterus. No conjunctival injection. ENT: No discharge. Pharynx clear. Mallampati class IV. Neck: Trachea midline. No obvious mass. Resp: No accessory muscle use. Minimal crackles.  No wheezes. No rhonchi. No dullness on percussion. Good air entry. CV: Regular rate. Regular rhythm. No murmur or rub. 1 + LE edema. GI: Non-tender. Non-distended. No hernia. Skin: Warm and dry. No nodule on exposed extremities. Lymph: No cervical LAD. No supraclavicular LAD. M/S: No cyanosis. No joint deformity. No clubbing. Neuro: Awake. Alert. Moves all four extremities. Psych: Oriented x 3. No anxiety. DATA reviewed by me:   PFTs 07/26/2021 FVC 2.82 (70%) FEV1 1.73 (57%) FEV1/FVC 61% TLC 5.40 (87%) DLCO 18.36 (65%)  PFTs 09/30/2020 FVC 2.61 (68%) FEV1 1.96 (66%) FEV1/FVC 75% TLC 6.36 (105%) DLCO 22.12 (81%) 6MW F Res Ex    CT chest 9/18/2020   No PE or aortic dissection  No adenopathy  No infiltrates or effusion    CTPA 4/21/2021  Pulmonary Arteries: Motion artifact degrades image quality. There is no  acute pulmonary thromboembolus. Mediastinum: Status post median sternotomy and CABG. There are no enlarged  thoracic lymph nodes. Lungs/pleura: Secretions layer within the mid trachea. There is no  pneumothorax. There is a trace left pleural effusion bibasilar atelectasis. No change in the multiple 3 mm solid bilateral upper lobe pulmonary nodules,  no follow-up imaging is recommended. Upper Abdomen: Status post cholecystectomy. Soft Tissues/Bones: Degenerative changes involve the thoracic spine. There  is an old healed left rib fracture. Moderate subcutaneous inflammatory stranding is present throughout the left  upper chest wall and axilla with mild associated skin thickening. There is  no drainable fluid collection. CTPA 9/5/2021 imaging reviewed by me and showed  No PE  Mildly dilated thoracic aorta  Post CABG  No mediastinal adenopathy  COPD with no acute abnormalities          LE doppler 4/29/21 negative DVT       TSH 0.9    HST 10/4/2021 AHI 50.9 and desaturation 76%. Saturation below 89% in 300 minutes minutes  CPAP titration 10/19/2021 BiPAP 19/14.       BiPAP data reviewed by me. Uses 5-6  hrs/night with 60% compliance and AHI of  0.7. BiPAP 19/14          Assessment:       · Moderate COPD   · Severe OANH. BiPAP 19/14 cmH2O. suboptimal compliance with controlled AHI upon review today. · Dyspnea on exertion-multifactorial due to obesity, deconditioning,  underlying COPD and cardiac conditions  · Bradycardia, AV block, ischemic cardiomyopathy post CABG September 2020 and [facility] 12/22/21 required stents and PCI followed by cardiology  · 45 pack year smoking- Quit March 2020         Plan:       Continue Trelegy and Albuterol INH/Neb Q 4 hrs PRN   O2 0-2 LPM on exertion. Advised to titrate O2 using her pulse oximeter- target O2 sat 90-92%. Advised to get his Covid, Pneumococcal vaccine and influenza vaccine this year   Advised to continue with smoking cessation  Order for BiPAP supplies  Obtain BiPAP compliance in 4-6 weeks   Advised to trim his beard   Advised to stop taking naps   Continue BiPAP 6-8 hrs at night and during naps. Replacement of mask, tubing, head straps every 3-6 months or sooner if damaged. Follow up BIPAP compliance and pressure adjustment if needed  Sleep hygiene  Avoid sedatives, alcohol and caffeinated drinks at bed time. No driving motorized vehicles or operating heavy machinery while fatigue, drowsy or sleepy. Weight loss is also recommended as a long-term intervention. Follow-up in 3 to 6 months.

## 2021-12-31 ENCOUNTER — APPOINTMENT (OUTPATIENT)
Dept: CARDIAC REHAB | Age: 61
End: 2021-12-31
Payer: COMMERCIAL

## 2022-01-03 ENCOUNTER — APPOINTMENT (OUTPATIENT)
Dept: CARDIAC REHAB | Age: 62
End: 2022-01-03
Payer: COMMERCIAL

## 2022-01-05 ENCOUNTER — APPOINTMENT (OUTPATIENT)
Dept: CARDIAC REHAB | Age: 62
End: 2022-01-05
Payer: COMMERCIAL

## 2022-01-05 ENCOUNTER — HOSPITAL ENCOUNTER (OUTPATIENT)
Dept: NEUROLOGY | Age: 62
Discharge: HOME | End: 2022-01-05
Payer: COMMERCIAL

## 2022-01-05 DIAGNOSIS — R20.2 PARESTHESIA OF BOTH HANDS: ICD-10-CM

## 2022-01-05 PROCEDURE — 95910 NRV CNDJ TEST 7-8 STUDIES: CPT

## 2022-01-05 PROCEDURE — 95886 MUSC TEST DONE W/N TEST COMP: CPT

## 2022-01-05 NOTE — PROCEDURES
Test Date:  2022    Patient: Bia Reyna : 1960 Physician: Demi Lucio DO   Sex: Male ID#:  Ref Phys: Vergia Lax, CNP     Patient Complaints:  Patient is a 63year-old male who presents with weakness in the hands right more severe since attempted stent placing. Patient History / Exam:  +CHF no endocrine disease. + surgery digit 3 right hand, right wrist fracture. + djd spine no spine surgery PE: reflexes trace. + hand intrinsic weakness    NCV & EMG Findings:  Evaluation of the left median (APB) motor and the right median (APB) motor nerves showed prolonged distal onset latency (L6.2, R5.4 ms) and reduced amplitude (L3.8, R3.7 mV). The left median sensory nerve showed prolonged distal peak latency (6.5 ms), reduced amplitude (3 µV), and decreased conduction velocity (22 m/s). The right median sensory nerve showed prolonged distal peak latency (5.0 ms) and decreased conduction velocity (28 m/s). The left ulnar sensory and the right ulnar sensory nerves showed reduced amplitude (L10, R11 µV). All remaining nerves (as indicated in the following tables) were within normal limits. All examined muscles (as indicated in the following table) showed no evidence of electrical instability. Impression:  Study is consistent with bilateral carpal tunnel syndrome moderate severity.  no evidence of an acute radiculopathy or other entrapment neuropathy Patient had difficulties relaxing cervical paraspinals        Demi Lucio DO        Nerve Conduction Studies  Motor Nerve Results      Latency Amplitude F-Lat Segment Distance CV Comment   Site (ms) Norm (mV) Norm (ms)  (cm) (m/s) Norm    Left Median (APB) Motor   Wrist 6.2  < 4.2 3.8  > 5.0         Elbow 10.3 - - -  Elbow-Wrist 21 51  > 50    Right Median (APB) Motor   Wrist 5.4  < 4.2 3.7  > 5.0         Elbow 9.7 - - -  Elbow-Wrist 23 53  > 50    Left Ulnar (ADM) Motor   Wrist 3.6  < 4.2 3.7  > 3.0         Bel Elbow 7.5 - 4.7 -  Bel Elbow-Wrist 25 64  > 50    Abv Elbow 9.1 - 3.7 -  Abv Elbow-Bel Elbow - -  > 48    Right Ulnar (ADM) Motor   Wrist 2.9  < 4.2 5.1  > 3.0         Bel Elbow 7.7 - 5.6 -  Bel Elbow-Wrist 24 50  > 50    Abv Elbow 8.8 - 5.6 -  Abv Elbow-Bel Elbow 6 55  > 48      Sensory Nerve Results      Latency (Peak) Amplitude (P-P) Segment Distance CV Comment   Site (ms) Norm (µV) Norm  (cm) (m/s) Norm    Left Median Sensory   Wrist-Dig II 6.5  < 3.6 3  > 10 Wrist-Dig II 14 22  > 39    Right Median Sensory   Wrist-Dig II 5.0  < 3.6 12  > 10 Wrist-Dig II 14 28  > 39    Left Ulnar Sensory   Wrist-Dig V 3.6  < 3.7 10  > 15 Wrist-Dig V 14 39  > 38    Right Ulnar Sensory   Wrist-Dig V 2.8  < 3.7 11  > 15 Wrist-Dig V 14 50  > 38        Electromyography     Side Muscle Nerve Root Ins Act Fibs Psw Amp Dur Poly Recrt Int Andre Irina Comment   Right Deltoid Axillary C5-C6 Nml Nml Nml Nml Nml 0 Nml Nml    Right Biceps Musculocut C5-C6 Nml Nml Nml Nml Nml 0 Nml Nml    Right Triceps Radial C6-C8 Nml Nml Nml Nml Nml 0 Nml Nml    Right Brachiorad Radial C5-C6 Nml Nml Nml Nml Nml 0 Nml Nml    Right Pronator Teres Median C6-C7 Nml Nml Nml Nml Nml 0 Nml Nml    Right EIP Post Interosseous,  R... C7-C8 Nml Nml Nml Nml Nml 0 Nml Nml    Right APB Median C8-T1 Nml Nml Nml Nml Nml 0 Nml Nml    Right FDI Ulnar C8-T1 Nml Nml Nml Nml Nml 0 Nml Nml    Right Cervical Paraspinal (Uppe. .. Rami C1-C3 Nml Nml Nml         Right Cervical Paraspinal (Mid) Rami C4-C6 Nml Nml Nml         Right Cervical Paraspinal (Elenore Cover. .. Rami C7-C8 Nml Nml Nml         Left Deltoid Axillary C5-C6 Nml Nml Nml Nml Nml 0 Nml Nml    Left Biceps Musculocut C5-C6 Nml Nml Nml Nml Nml 0 Nml Nml    Left Triceps Radial C6-C8 Nml Nml Nml Nml Nml 0 Nml Nml    Left Brachiorad Radial C5-C6 Nml Nml Nml Nml Nml 0 Nml Nml    Left Pronator Teres Median C6-C7 Nml Nml Nml Nml Nml 0 Nml Nml    Left EIP Post Interosseous,  R...  C7-C8 Nml Nml Nml Nml Nml 0 Nml Nml    Left APB Median C8-T1 Nml Nml Nml Nml Nml 0 Nml Nml Left FDI Ulnar C8-T1 Nml Nml Nml Nml Nml 0 Nml Nml    Left Cervical Paraspinal (Uppe. .. Rami C1-C3 Nml Nml Nml         Left Cervical Paraspinal (Mid) Rami C4-C6 Nml Nml Nml         Left Cervical Paraspinal (Suzanne Shook. ..  Rami C7-C8 Nml Nml Nml             Electronically signed by Yony Lima DO on 1/5/2022 at 7:58 AM

## 2022-01-06 ENCOUNTER — OFFICE VISIT (OUTPATIENT)
Dept: CARDIOLOGY CLINIC | Age: 62
End: 2022-01-06
Payer: COMMERCIAL

## 2022-01-06 ENCOUNTER — NURSE ONLY (OUTPATIENT)
Dept: CARDIOLOGY CLINIC | Age: 62
End: 2022-01-06

## 2022-01-06 VITALS
BODY MASS INDEX: 52.41 KG/M2 | DIASTOLIC BLOOD PRESSURE: 66 MMHG | OXYGEN SATURATION: 93 % | HEART RATE: 90 BPM | SYSTOLIC BLOOD PRESSURE: 134 MMHG | WEIGHT: 307 LBS | HEIGHT: 64 IN

## 2022-01-06 DIAGNOSIS — Z95.0 PACEMAKER: ICD-10-CM

## 2022-01-06 DIAGNOSIS — I50.42 CHRONIC COMBINED SYSTOLIC AND DIASTOLIC CONGESTIVE HEART FAILURE (HCC): ICD-10-CM

## 2022-01-06 DIAGNOSIS — M79.601 RIGHT ARM PAIN: ICD-10-CM

## 2022-01-06 DIAGNOSIS — I25.5 ISCHEMIC CARDIOMYOPATHY: ICD-10-CM

## 2022-01-06 DIAGNOSIS — M79.604 RIGHT LEG PAIN: ICD-10-CM

## 2022-01-06 DIAGNOSIS — I25.110 CORONARY ARTERY DISEASE INVOLVING NATIVE CORONARY ARTERY OF NATIVE HEART WITH UNSTABLE ANGINA PECTORIS (HCC): Primary | ICD-10-CM

## 2022-01-06 PROCEDURE — G8482 FLU IMMUNIZE ORDER/ADMIN: HCPCS | Performed by: NURSE PRACTITIONER

## 2022-01-06 PROCEDURE — 93000 ELECTROCARDIOGRAM COMPLETE: CPT | Performed by: NURSE PRACTITIONER

## 2022-01-06 PROCEDURE — 3017F COLORECTAL CA SCREEN DOC REV: CPT | Performed by: NURSE PRACTITIONER

## 2022-01-06 PROCEDURE — G8417 CALC BMI ABV UP PARAM F/U: HCPCS | Performed by: NURSE PRACTITIONER

## 2022-01-06 PROCEDURE — G8427 DOCREV CUR MEDS BY ELIG CLIN: HCPCS | Performed by: NURSE PRACTITIONER

## 2022-01-06 PROCEDURE — 1036F TOBACCO NON-USER: CPT | Performed by: NURSE PRACTITIONER

## 2022-01-06 PROCEDURE — 99214 OFFICE O/P EST MOD 30 MIN: CPT | Performed by: NURSE PRACTITIONER

## 2022-01-06 RX ORDER — ISOSORBIDE MONONITRATE 30 MG/1
60 TABLET, EXTENDED RELEASE ORAL DAILY
Qty: 60 TABLET | Refills: 3
Start: 2022-01-06 | End: 2022-01-19 | Stop reason: SDUPTHER

## 2022-01-06 RX ORDER — RANOLAZINE 500 MG/1
500 TABLET, EXTENDED RELEASE ORAL 2 TIMES DAILY
Qty: 60 TABLET | Refills: 5 | Status: SHIPPED | OUTPATIENT
Start: 2022-01-06 | End: 2022-05-12

## 2022-01-06 NOTE — PROGRESS NOTES
[address]   Cardiology Note              Date:  January 6, 2022  Patientname: Flo Day  YOB: 1960    Primary Care physician: ANICETO Deras CNP    HISTORY OF PRESENT ILLNESS: Flo Day is a 63 y.o. male with a history of CAD, ischemic cardiomyopathy, CHF, SND, OANH, COPD, obesity. He was admitted 9/2020 for chest pain. Stress test abnormal. LHC showed severe CAD. Echo showed EF 55-60%. On 9/25/2020 he had CABG x3. He was readmitted 4/2021 for hypotension/DENIA. Echo showed EF 45-50%. LHC showed occluded SVGs, PCI vs repeat CABG discussed. On 4/19/2021 he had rotablator ISREAL LM/LAD and ISREAL D1. On 7/23/2021 he had ILR implant due to intermittent mobitz 1 and CHB on telemetry. Multiple pauses noted and on 8/26/2021 he had dual chamber ppm implant and ILR explant. Stress test 12/2021 abnormal. Echo showed EF 55%. On 12/22/2021, he had shockwave/rotablator/ISREAL RCA and ISREAL Diag. Today he presents for hospital follow up for CAD s/p PCI. He feels terrible, worse than before. He has chest pain and shortness of breath with minimal exertion. He has fatigue. He states his BP is low at night and he feels dizzy. He states he has significant right radial and right femoral pain. He has felt bad for at least a year. He did feel good for a short time after CABG.      Past Medical History:   has a past medical history of Acute diverticulitis, Acute kidney injury ([address] Acute on chronic respiratory failure with hypoxemia ([address] Acute respiratory failure ([address] Acute respiratory failure with hypoxia ([address] Anxiety, Aortic valve calcification, CAD in native artery, Chronic obstructive pulmonary disease (HCC), Chronic systolic congestive heart failure (HCC), Class 3 severe obesity with body mass index (BMI) of 45.0 to 49.9 in [facility]), Coronary artery calcification, Coronary artery disease involving native heart with angina pectoris ([address] Crush injury of right foot, DDD (degenerative disc disease), lumbar, Depression, Diverticulitis of colon, Erectile dysfunction, Fatigue, HNP (herniated nucleus pulposus), lumbar, Hyperglycemia, Hyperlipidemia, Hypersomnia, Hypertension, Insomnia, Ischemic cardiomyopathy, Kidney stone, Lumbar radiculopathy, Lumbar spine pain, LVH (left ventricular hypertrophy), Mobitz type I Wenckebach atrioventricular block, Moderate protein-calorie malnutrition ([address] Observed sleep apnea, OANH (obstructive sleep apnea), Pneumonia, primary atypical, Reactive depression, S/P three vessel coronary artery bypass, Spondylosis without myelopathy or radiculopathy, lumbar region, Tobacco abuse, Tobacco use, and Wears dentures. Past Surgical History:   has a past surgical history that includes Cholecystectomy; Cystoscopy (03/16/2011); Pain management procedure (Right, 12/24/2019); Pain management procedure (Right, 1/7/2020); Coronary artery bypass graft (N/A, 9/25/2020); Finger amputation (Right, 2/2/2021); Coronary angioplasty with stent (03/2021); and Ankle surgery (Left, 8/2/2021). Home Medications:    Prior to Admission medications    Medication Sig Start Date End Date Taking?  Authorizing Provider   isosorbide mononitrate (IMDUR) 30 MG extended release tablet Take 1 tablet by mouth daily 12/24/21   ANICETO Valdez CNP   Multiple Vitamins-Minerals (THERAPEUTIC MULTIVITAMIN-MINERALS) tablet Take 1 tablet by mouth daily    Historical Provider, MD   atorvastatin (LIPITOR) 80 MG tablet Take 1 tablet by mouth nightly 12/21/21   Phil Soares DO   furosemide (LASIX) 40 MG tablet Take 1 tablet by mouth as needed (for weight grater than 300lbs) 12/14/21 12/14/22  Paulette Rankin MD   Cholecalciferol (VITAMIN D3) 1.25 MG (00281 UT) CAPS 1 po a week 11/18/21   ANICETO Prescott CNP   traZODone (DESYREL) 100 MG tablet Take 1 tablet by mouth 2 times daily Am pm 11/15/21   ANICETO Prescott CNP   benzonatate (TESSALON) 200 MG capsule TAKE 1 CAPSULE BY MOUTH THREE TIMES A DAY AS NEEDED FOR COUGH 11/15/21   ANICETO Chambers CNP   DULoxetine (CYMBALTA) 60 MG extended release capsule Take 1 capsule by mouth daily 11/11/21   ANICETO Chambers CNP   fluticasone-umeclidin-vilant (TRELEGY ELLIPTA) 100-62.5-25 MCG/INH AEPB Inhale 1 puff into the lungs daily 10/21/21   Rubin Berumen MD   albuterol (PROVENTIL) (2.5 MG/3ML) 0.083% nebulizer solution Take 3 mLs by nebulization every 6 hours as needed for Wheezing or Shortness of Breath 9/9/21   Rubin Berumen MD   OXYGEN Inhale 2 L into the lungs as needed     Historical Provider, MD   nitroGLYCERIN (NITROSTAT) 0.4 MG SL tablet Place 1 tablet under the tongue every 5 minutes as needed for Chest pain 4/29/21   Tevin Huynh MD   metoprolol succinate (TOPROL XL) 25 MG extended release tablet Take 1 tablet by mouth daily  Patient not taking: Reported on 12/30/2021 4/20/21   Agnse ANICETO Lindsey CNP   clopidogrel (PLAVIX) 75 MG tablet Take 1 tablet by mouth daily 1/13/21   Tevin Huynh MD   aspirin 81 MG EC tablet Take 1 tablet by mouth daily 9/30/20   ANICETO Tadeo CNP   albuterol sulfate HFA (PROVENTIL HFA) 108 (90 Base) MCG/ACT inhaler Inhale 2 puffs into the lungs every 6 hours as needed for Wheezing 5/17/19   Holly Shrestha MD     Allergies:  Dilaudid [hydromorphone hcl] and Codeine    Social History:   reports that he quit smoking about 22 months ago. His smoking use included cigarettes. He has a 70.00 pack-year smoking history. He has never used smokeless tobacco. He reports previous drug use. Drugs: Marijuana (Weed) and Cocaine. He reports that he does not drink alcohol. Family History: family history includes Heart Disease in his mother; High Blood Pressure in his sister; No Known Problems in his sister; Other in his mother. Review of Systems   Review of Systems   Constitutional: Positive for fatigue. Respiratory: Positive for shortness of breath.     Cardiovascular: Positive for chest pain and leg swelling. Neurological: Positive for dizziness. OBJECTIVE:    Vital signs:    /66   Pulse 90   Ht 5' 4\" (1.626 m)   Wt (!) 307 lb (139.3 kg)   SpO2 93%   BMI 52.70 kg/m²      Physical Exam:  Constitutional:  Comfortable and alert, NAD, appears older than stated age, obese  Eyes: PERRL, sclera nonicteric  Neck:  Supple, no masses, no thyroidmegaly, JVD difficult to assess  Skin:  Warm and dry; no rash or lesions  Heart: Regular, normal apex, S1 and S2 normal, no M/G/R  Lungs:  Normal respiratory effort; clear; no wheezing/rhonchi/rales  Abdomen: soft, non tender, + bowel sounds  Extremities: Trace BLE edema  Neuro: alert and oriented, moves legs and arms equally, normal mood and affect  Right radial site soft, no hematoma, 2+ pulse  Right femoral site soft, no hematoma, 2+ R femoral pulse, 2+ R DP/PT pulse    Data Reviewed:      Coronary angiogram 12/22/2021: Intermediate risk abnormal stress test  PROCEDURES PERFORMED    Left heart catheterization  LVgram  Aortogram  Coronary angiogam  Coronary cath  LIMA angiogram  Monitoring of moderate conscious sedation  IVUS of D1  PCI of D1 with single drug-eluting stent  IVUS of RCA  Rotational atherectomy of RCA  Shockwave coronary lithotripsy of RCA  PCI of RCA with single drug-eluting stent  PROCEDURE DESCRIPTION   Risks/benefits/alternatives/outcomes were discussed with patient and/or family and informed consent was obtained. Using the [facility] scale, the patient's right radial artery was found to be a level B. Patient was prepped draped in the usual sterile fashion. Local anaesthetic was applied over puncture sites. Using ultrasound guidance, unsuccessful attempt was made at right radial artery cannulation, and as such using ultrasound and fluoroscopic technique, attention turned towards the right groin and a 5 Vietnamese sheath was inserted into the right femoral artery.   Diagnostic 5 Vietnamese pigtail, JL 4.5, 3 DRC catheters used for diagnostic angiography. Attention then turned towards PCI as noted below. At end of procedure, the interventional sheath was sutured in place for later removal.    There were no immediate complications. I supervised sedation from 9:15 AM to 11:45 AM with versed 9 mg/fentanyl 300 mcg during the procedure. An independent trained observer pushed meds at my direction. We monitored the patient's level of consciousness and vital signs/physiologic status throughout the procedure duration (see times listed previously). 285 cc contrast was utilized. <20cc EBL  FINDINGS     LVEDP  10   GRADIENT ACROSS AORTIC VALVE  none   LV FUNCTION EF 55-60%   WALL MOTION Subtle mid inferior hypokinesis. MITRAL REGURGITATION  mild     CALIBER  mildly increased in size   AORTIC INSUFFICIENCY  minimal   BYPASS GRAFTS  no bypass grafts visualized     LM [id number] less than 10% stenosis, there is a stent noted in the mid to distal left main that is widely patent.         LAD Proximal-mid 80% stenosis, there is mid-distal nonvisualization of vessel due to bidirectional flow from the LIMA graft.         LCX  Calcified, ostial 85% stenosis, proximal-mid 20% stenosis.     OM 1 has 100% [id number] .     There are well-developed right to left collaterals as well as some left to left collaterals.         RCA Large vessel, dominant, calcified, mild to moderately tortuous, proximal-mid 75% stenosis. Distal 50 to 60% stenosis.     PDA is a medium to large size vessel with proximal-mid 60-70% stenosis. LIMA-LAD  widely patent, there is less than 10% proximal-mid stenosis, however distally at the anastomosis there is a 60% stenosis.          SVG-D1  known to be occluded, not injected         SVG-OM  known to be occluded, not injected           PERCUTANEOUS INTERVENTION DESCRIPTION    Bivalirudin was used for anticoagulation, at the end of the procedure, patient was given oral loading dose of Plavix as well as a single bolus of Integrilin. The initial 5 [place] African groin sheath was upsized to a 6 Western Janis Terumo 45 cm destination sheath. A 6 South African VL 3.5 guiding cath was used to intubate the left main. A run-through wire was used to cross the lesions in D1 and the lesions of D1 were assessed with IVUS which showed severe in-stent restenosis with a minimal luminal diameter of 2.75 to 3 mm. As such lesions were treated with a 2.75 mm cutting balloon as well as a 3 mm noncompliant balloon and then were stented with a Medtronic resolute [place] 2.75 x 18 mm drug-eluting stent. Stent was postdilated with 3 mm noncompliant balloon. Follow-up IVUS showed good stent apposition/expansion, there was no residual dissection/thrombus noted. As such, attention turned towards the RCA and the initial 6 [place] Terumo destination sheath was upsized to a 7 Western Janis 45 cm Terumo destination sheath in the right groin. Then a [address] guiding cath was used to intubate the RCA. A run-through wire was used to cross the lesions in the RCA and IVUS was performed of the RCA which showed extensive calcification in the proximal-mid vessel with 75% stenoses. As such a Rotafloppy wire was taken and that was used to cross the lesions and the run-through wire was removed. Rotational atherectomy was then performed with a 1.5 mm angela. Shockwave coronary lithotripsy was then performed with a 4 mm balloon. Lesion was then stented with assistance of 7 Western Janis guide extension catheter with a Medtronic resolute [place] 4 x 34 mm drug-eluting stent and stent was postdilated with a 4 mm noncompliant balloon and additionally in the proximal segment with a 5 mm noncompliant balloon. Follow-up IVUS showed good stent apposition/expansion. There was no residual dissection/thrombus noted. There was mild to moderate disease noted in the mid-distal vessel beyond the stented segment and this was felt to be best treated medically.   During the procedure, patient was restless and required redirection as well as increased sedation. Groin site remained stable throughout the case, overall, hemodynamics and EKG were stable although during the procedure he did have to be supported with phenylephrine but this was ultimately able to be weaned at the end of the procedure. CONCLUSIONS:    Successful PCI of D1 with single drug-eluting stent   Successful rotational atherectomy of RCA  Successful shockwave coronary lithotripsy of RCA  Successful PCI of RCA with single drug-eluting stent   Consider staged PCI of LIMA to LAD   Consider referral to [facility] Dr Isaac Arcos for consideration of LCx  PCI    Echo 12/2021:  Technically difficult examination secondary to habitus. LV systolic function is normal with EF estimated at 55%. Endocardium not entirely well visualized but no obvious segmental wall   motion abnormalities. There is mild concentric left ventricular hypertrophy. Normal diastolic function with normal LV filling pressure. Mild mitral annular calcification. Aortic valve appears sclerotic but opens adequately. Individual aortic valve leaflets are not clearly visualized. Cannot r/o   bicuspid valve. Coronary angiogram 4/2021:  Unstable angina  PROCEDURES PERFORMED    Left heart catheterization  Coronary angiogam  Coronary cath  Temporary transvenous pacer insertion and removal  Insertion of short term external heart assist system into heart, intraoperative, percutaneous approach  Assistance with cardiac output using impeller pump, continuous  IVUS of left main  IVUS of LAD  IVUS of D1  Rotational atherectomy of left main  Rotational atherectomy of LAD  Rotational atherectomy of D1  PCI of D1 with single drug-eluting stent  PCI of left main/LAD with single drug-eluting stent  PROCEDURE DESCRIPTION   Risks/benefits/alternatives/outcomes were discussed with patient and/or family and informed consent was obtained.   Using the [facility] scale, the patient's right radial artery was found to be a level B. Patient was prepped draped in the usual sterile fashion. Local anaesthetic was applied over puncture site. Using a back wall technique and ultrasound guidance, a 6 Latvian Terumo sheath was inserted into right radial artery. Verapamil, nitroglycerin, nicardipine were administered through the sheath. Using fluoroscopic and ultrasound guidance, an 8 Czech sheath was inserted into the right common femoral vein. A temporary transvenous pacer was inserted and capture was demonstrated and this was placed in backup mode during the procedure. Temporary transvenous pacer was removed at the end of the case and the venous sheath was sutured in place for later removal.  Using similar technique, a 6 Czech sheath was placed into the left common femoral artery. A single Perclose device was then inserted using preclosed technique and then the site was dilated with 10 and 12 Western Janis dilators and a an Impella CP sheath was placed. Using a pigtail catheter, the aortic valve was crossed for left heart catheterization. Then the 0.018 inch Impella wire was advanced into the left ventricle and then the pigtail was exchanged for the Impella catheter. Impella was used during the procedure and was able to be weaned and removed at the end of the procedure. Using a 6 Czech 110 cm Terumo sheath along with a 10 mm balloon, a dry close technique was employed along with the single Perclose to close the left common femoral artery arteriotomy. At the conclusion of the procedure, a TR band was placed over the puncture site and hemostasis was obtained. There were no immediate complications. Sedation was provided by the anesthesiology service. 325 cc contrast was utilized. <50cc EBL. LVEDP  5    See diagnostic catheterization report for full details, would add that there is severe tortuosity and eccentric plaque within the left main, LAD and D1 which proximal to mid was 75% and distally was 99%.   PERCUTANEOUS INTERVENTION DESCRIPTION    Heparin was used for anticoagulation, patient was preloaded with Brilinta. A 7 Urdu XB 3.5 guiding cath was used to intubate the left main. A choice floppy wire with a Turnpike microcatheter was initially taken however the lesion could not be crossed with this. Several other wires were taken including a cougar XT and PT to moderate support wire. Additional microcatheters were taken but it remained difficult to cross into D1 despite use of a Corsair pro access microcatheter. Ultimately a super cross microcatheter with the PT to moderate support wire was able to be utilized to cross the lesion into D1. The super cross microcatheter was then removed in favor of the Corsair XS pro microcatheter which was able to be advanced distally. Then the [facility] wire was able to be utilized successfully to cross the distal lesions in the diagonal artery. The Corsair microcatheter was then advanced distally and the [facility] XT wire was removed in favor of a workhorse wire. Lesions were then dilated distally with 2 and 2.25 mm balloons including cutting balloon. IVUS was then performed which revealed a minimal luminal diameter distally of 2 to 2-1/2 mm and more proximally in the diagonal branch of 3 mm. The LAD was felt to be a 4 to 4.5 mm vessel with extensive disease and calcification. As such rotational atherectomy was performed with a 1.5 mm bur from the left main into LAD as well as D1. Lesions were then stented with Abbott Xience Marisel 2.25 x 38 mm drug-eluting stent as well as a 3.0 x 38 mm drug-eluting stent. Stents were postdilated with 2.5, 3.0, 4.0 and 4.5 mm noncompliant balloons from distal to proximal segments. Follow-up IVUS showed good stent apposition/expansion. There was 0% residual stenosis. There was RAY 3 flow before and after PCI.      CONCLUSIONS:    Successful rotational atherectomy and PCI of D1 with single drug-eluting stent  Successful rotational atherectomy and PCI of left main/LAD with single drug-eluting stent  Medical therapy for LCx , this lesion does not appear to be amenable for PCI    CABG 9/25/2020:  Urgent coronary artery bypass grafting surgery x3 with a single greater saphenous vein graft to the obtuse marginal branch of the circumflex, separate single greater saphenous vein graft to the first diagonal branch of the LAD, pedicled left internal artery to the LAD. Hubert-Augusto catheter placement. Cardiopulmonary bypass. Endoscopic vein harvesting of the right greater saphenous vein. Transesophageal echo. Epiaortic ultrasound. Doppler verification of grafts. Bilateral five-level intercostal nerve block with Exparel. Platelet gel application     Cardiology Labs Reviewed:   CBC: No results for input(s): WBC, HGB, HCT, PLT in the last 72 hours. BMP:No results for input(s): NA, K, CO2, BUN, CREATININE, LABGLOM, GLUCOSE in the last 72 hours. PT/INR: No results for input(s): PROTIME, INR in the last 72 hours. APTT:No results for input(s): APTT in the last 72 hours. FASTING LIPID PANEL:  Lab Results   Component Value Date    HDL 34 11/11/2021    LDLCALC 23 11/11/2021    TRIG 126 11/11/2021     LIVER PROFILE:No results for input(s): AST, ALT, ALB in the last 72 hours.   BNP:   Lab Results   Component Value Date    PROBNP 298 09/05/2021    PROBNP 151 08/23/2021    PROBNP 522 04/21/2021    PROBNP 147 01/13/2021    PROBNP 236 10/19/2020     Reviewed all labs and imaging today    Assessment:   Shortness of breath: worse  Chest pain: worse  Fatigue: no improvement off beta blockerI or post ppm  CAD: s/p shockwave/rotablator/ISREAL RCA and ISREAL Diag 12/2021; s/p rotablator/ISREAL LM/LAD and ISREAL Diag 4/2021; s/p CABG x3 (SVGs occluded) 9/2020   - medical management  LCx  Ischemic cardiomyopathy: improved, EF 55% on echo 12/2021 from 45-50% 4/2021  Chronic combined CHF: appears compensated though has weight gain  SND: s/p dual chamber ppm 8/2021 with ILR explant  HTN: stable, reported hypotension at night  HLD: stable, LDL 23, statin  History of syncope  COPD  OANH: noncompliant with CPAP  Obesity: weight loss recommended    Plan:   1. Increase imdur to 60 mg daily  2. Start ranexa 500 mg BID  3. Right radial and right femoral ultrasound for pain post cath. 4. Discussed with Dr. Víctor Steiner, recommend referral to Dr. Kojo Castellon with [facility] for PCI  LCx.  5, Continue aspirin, statin, Plavix, Toprol, prn lasix; ACE stopped due to hypotension/DENIA  6. Cardiac rehab referral once symptoms stable  7.  Follow up as planned with Dr. Beverly Damon, [address]  [phone]

## 2022-01-06 NOTE — PATIENT INSTRUCTIONS
Ultrasounds of arm and leg  Increase imdur to 60 mg daily  Start ranexa 500 mg twice a day  Will discuss with Dr. Nesha Mesa and Dr. Augustina Correa further steps  Follow up with Dr. Augustina Correa

## 2022-01-07 ENCOUNTER — APPOINTMENT (OUTPATIENT)
Dept: CARDIAC REHAB | Age: 62
End: 2022-01-07
Payer: COMMERCIAL

## 2022-01-08 ASSESSMENT — ENCOUNTER SYMPTOMS: SHORTNESS OF BREATH: 1

## 2022-01-10 ENCOUNTER — APPOINTMENT (OUTPATIENT)
Dept: CARDIAC REHAB | Age: 62
End: 2022-01-10
Payer: COMMERCIAL

## 2022-01-11 ENCOUNTER — TELEPHONE (OUTPATIENT)
Dept: CARDIOLOGY CLINIC | Age: 62
End: 2022-01-11

## 2022-01-11 ENCOUNTER — HOSPITAL ENCOUNTER (OUTPATIENT)
Dept: VASCULAR LAB | Age: 62
Discharge: HOME | End: 2022-01-11
Payer: COMMERCIAL

## 2022-01-11 DIAGNOSIS — M79.604 RIGHT LEG PAIN: ICD-10-CM

## 2022-01-11 DIAGNOSIS — M79.601 RIGHT ARM PAIN: ICD-10-CM

## 2022-01-11 PROCEDURE — 93926 LOWER EXTREMITY STUDY: CPT

## 2022-01-12 ENCOUNTER — APPOINTMENT (OUTPATIENT)
Dept: CARDIAC REHAB | Age: 62
End: 2022-01-12
Payer: COMMERCIAL

## 2022-01-14 ENCOUNTER — APPOINTMENT (OUTPATIENT)
Dept: CARDIAC REHAB | Age: 62
End: 2022-01-14
Payer: COMMERCIAL

## 2022-01-17 ENCOUNTER — APPOINTMENT (OUTPATIENT)
Dept: CARDIAC REHAB | Age: 62
End: 2022-01-17
Payer: COMMERCIAL

## 2022-01-19 ENCOUNTER — OFFICE VISIT (OUTPATIENT)
Dept: FAMILY MEDICINE CLINIC | Age: 62
End: 2022-01-19
Payer: COMMERCIAL

## 2022-01-19 ENCOUNTER — APPOINTMENT (OUTPATIENT)
Dept: CARDIAC REHAB | Age: 62
End: 2022-01-19
Payer: COMMERCIAL

## 2022-01-19 VITALS
HEART RATE: 84 BPM | WEIGHT: 298 LBS | OXYGEN SATURATION: 98 % | BODY MASS INDEX: 50.88 KG/M2 | SYSTOLIC BLOOD PRESSURE: 126 MMHG | DIASTOLIC BLOOD PRESSURE: 68 MMHG | HEIGHT: 64 IN

## 2022-01-19 DIAGNOSIS — I44.1 MOBITZ TYPE I WENCKEBACH ATRIOVENTRICULAR BLOCK: ICD-10-CM

## 2022-01-19 DIAGNOSIS — F41.9 ANXIETY: ICD-10-CM

## 2022-01-19 DIAGNOSIS — G47.33 OSA (OBSTRUCTIVE SLEEP APNEA): ICD-10-CM

## 2022-01-19 DIAGNOSIS — I44.2 CHB (COMPLETE HEART BLOCK) (HCC): ICD-10-CM

## 2022-01-19 DIAGNOSIS — I25.10 ASHD (ARTERIOSCLEROTIC HEART DISEASE): ICD-10-CM

## 2022-01-19 DIAGNOSIS — Z95.1 S/P THREE VESSEL CORONARY ARTERY BYPASS: ICD-10-CM

## 2022-01-19 DIAGNOSIS — I50.31 ACUTE DIASTOLIC CONGESTIVE HEART FAILURE (HCC): ICD-10-CM

## 2022-01-19 DIAGNOSIS — I25.5 ISCHEMIC CARDIOMYOPATHY: ICD-10-CM

## 2022-01-19 DIAGNOSIS — Z95.0 PACEMAKER: Primary | ICD-10-CM

## 2022-01-19 DIAGNOSIS — F32.9 REACTIVE DEPRESSION: ICD-10-CM

## 2022-01-19 DIAGNOSIS — I50.42 CHRONIC COMBINED SYSTOLIC AND DIASTOLIC CONGESTIVE HEART FAILURE (HCC): ICD-10-CM

## 2022-01-19 DIAGNOSIS — J44.9 CHRONIC OBSTRUCTIVE PULMONARY DISEASE, UNSPECIFIED COPD TYPE (HCC): ICD-10-CM

## 2022-01-19 DIAGNOSIS — E66.01 CLASS 3 SEVERE OBESITY DUE TO EXCESS CALORIES WITH SERIOUS COMORBIDITY AND BODY MASS INDEX (BMI) OF 45.0 TO 49.9 IN ADULT (HCC): ICD-10-CM

## 2022-01-19 PROCEDURE — G8427 DOCREV CUR MEDS BY ELIG CLIN: HCPCS | Performed by: NURSE PRACTITIONER

## 2022-01-19 PROCEDURE — G8417 CALC BMI ABV UP PARAM F/U: HCPCS | Performed by: NURSE PRACTITIONER

## 2022-01-19 PROCEDURE — 3017F COLORECTAL CA SCREEN DOC REV: CPT | Performed by: NURSE PRACTITIONER

## 2022-01-19 PROCEDURE — 99214 OFFICE O/P EST MOD 30 MIN: CPT | Performed by: NURSE PRACTITIONER

## 2022-01-19 PROCEDURE — 1111F DSCHRG MED/CURRENT MED MERGE: CPT | Performed by: NURSE PRACTITIONER

## 2022-01-19 PROCEDURE — 1036F TOBACCO NON-USER: CPT | Performed by: NURSE PRACTITIONER

## 2022-01-19 PROCEDURE — G8482 FLU IMMUNIZE ORDER/ADMIN: HCPCS | Performed by: NURSE PRACTITIONER

## 2022-01-19 PROCEDURE — 3023F SPIROM DOC REV: CPT | Performed by: NURSE PRACTITIONER

## 2022-01-19 RX ORDER — BENZONATATE 200 MG/1
CAPSULE ORAL
Qty: 30 CAPSULE | Refills: 0 | Status: SHIPPED | OUTPATIENT
Start: 2022-01-19 | End: 2022-02-18 | Stop reason: SDUPTHER

## 2022-01-19 RX ORDER — ISOSORBIDE MONONITRATE 30 MG/1
60 TABLET, EXTENDED RELEASE ORAL DAILY
Qty: 60 TABLET | Refills: 3 | Status: SHIPPED | OUTPATIENT
Start: 2022-01-19

## 2022-01-19 RX ORDER — BREXPIPRAZOLE 1 MG/1
TABLET ORAL
COMMUNITY
Start: 2022-01-11 | End: 2022-03-30 | Stop reason: DRUGHIGH

## 2022-01-19 RX ORDER — HYDROXYZINE PAMOATE 25 MG/1
25 CAPSULE ORAL 2 TIMES DAILY
COMMUNITY
Start: 2022-01-11

## 2022-01-19 RX ORDER — DULOXETIN HYDROCHLORIDE 60 MG/1
60 CAPSULE, DELAYED RELEASE ORAL DAILY
Qty: 30 CAPSULE | Refills: 0 | Status: SHIPPED | OUTPATIENT
Start: 2022-01-19 | End: 2022-02-18

## 2022-01-19 NOTE — TELEPHONE ENCOUNTER
Upon further research, pt was set up 11/4/21 and has until 2/2/22. Will get compliance 4 weeks from last visit per below message (approx 1/27/22). Pt was also informed.

## 2022-01-19 NOTE — TELEPHONE ENCOUNTER
Pt called stating that Leslye informed pt that his insurance isn't going to continue to pay for machine due to non-compliance unless Dr. Mily Friedman writes a 2 month extension.

## 2022-01-20 RX ORDER — FUROSEMIDE 40 MG/1
40 TABLET ORAL PRN
Qty: 45 TABLET | Refills: 3 | Status: SHIPPED | OUTPATIENT
Start: 2022-01-20 | End: 2022-05-18 | Stop reason: SDUPTHER

## 2022-01-21 ENCOUNTER — APPOINTMENT (OUTPATIENT)
Dept: CARDIAC REHAB | Age: 62
End: 2022-01-21
Payer: COMMERCIAL

## 2022-01-24 ENCOUNTER — APPOINTMENT (OUTPATIENT)
Dept: CARDIAC REHAB | Age: 62
End: 2022-01-24
Payer: COMMERCIAL

## 2022-01-24 NOTE — PROGRESS NOTES
Wenckebach atrioventricular block    Asystole (HCC)    Ischemic cardiomyopathy    Diverticulitis of colon    Coronary artery disease involving native coronary artery of native heart with unstable angina pectoris (HCC)    CHB (complete heart block) (HCC)    Sinus bradycardia    Cellulitis    Acute deep vein thrombosis (DVT) of left upper extremity (HCC)    Cellulitis of chest wall    Painful orthopaedic hardware (HCC)    SSS (sick sinus syndrome) (HCC)    Observed sleep apnea    ASHD (arteriosclerotic heart disease)    Pacemaker       Allergies   Allergen Reactions    Dilaudid [Hydromorphone Hcl] Nausea And Vomiting    Codeine Itching       Medications listed as ordered at the time of discharge from hospital     Medication List          Accurate as of January 19, 2022 11:59 PM. If you have any questions, ask your nurse or doctor.             CONTINUE taking these medications    * albuterol sulfate  (90 Base) MCG/ACT inhaler  Commonly known as: Proventil HFA  Inhale 2 puffs into the lungs every 6 hours as needed for Wheezing     * albuterol (2.5 MG/3ML) 0.083% nebulizer solution  Commonly known as: PROVENTIL  Take 3 mLs by nebulization every 6 hours as needed for Wheezing or Shortness of Breath     aspirin 81 MG EC tablet  Take 1 tablet by mouth daily     atorvastatin 80 MG tablet  Commonly known as: LIPITOR  Take 1 tablet by mouth nightly     benzonatate 200 MG capsule  Commonly known as: TESSALON  TAKE 1 CAPSULE BY MOUTH THREE TIMES A DAY AS NEEDED FOR COUGH     clopidogrel 75 MG tablet  Commonly known as: PLAVIX  Take 1 tablet by mouth daily     DULoxetine 60 MG extended release capsule  Commonly known as: CYMBALTA  TAKE 1 CAPSULE BY MOUTH DAILY     furosemide 40 MG tablet  Commonly known as: LASIX  Take 1 tablet by mouth as needed (for weight grater than 300lbs)     hydrOXYzine 25 MG capsule  Commonly known as: VISTARIL     isosorbide mononitrate 30 MG extended release tablet  Commonly known as: IMDUR  Take 2 tablets by mouth daily     metoprolol succinate 25 MG extended release tablet  Commonly known as: Toprol XL  Take 1 tablet by mouth daily     nitroGLYCERIN 0.4 MG SL tablet  Commonly known as: Nitrostat  Place 1 tablet under the tongue every 5 minutes as needed for Chest pain     OXYGEN     ranolazine 500 MG extended release tablet  Commonly known as: Ranexa  Take 1 tablet by mouth 2 times daily     Rexulti 1 MG Tabs tablet  Generic drug: brexpiprazole     therapeutic multivitamin-minerals tablet     traZODone 100 MG tablet  Commonly known as: DESYREL  Take 1 tablet by mouth 2 times daily Am pm     Trelegy Ellipta 100-62.5-25 MCG/INH Aepb  Generic drug: fluticasone-umeclidin-vilant  Inhale 1 puff into the lungs daily     Vitamin D3 1.25 MG (23995 UT) Caps  1 po a week         * This list has 2 medication(s) that are the same as other medications prescribed for you. Read the directions carefully, and ask your doctor or other care provider to review them with you.                Where to Get Your Medications      These medications were sent to [facility] [address]  [facility], [address] [place] [address],[address] [place] [address]    Phone: [phone]   · benzonatate 200 MG capsule  · DULoxetine 60 MG extended release capsule  · furosemide 40 MG tablet  · isosorbide mononitrate 30 MG extended release tablet           Medications marked \"taking\" at this time  Outpatient Medications Marked as Taking for the 1/19/22 encounter (Office Visit) with ANICETO Gonzalez - CNP   Medication Sig Dispense Refill    hydrOXYzine (VISTARIL) 25 MG capsule       REXULTI 1 MG TABS tablet       ranolazine (RANEXA) 500 MG extended release tablet Take 1 tablet by mouth 2 times daily 60 tablet 5    [DISCONTINUED] isosorbide mononitrate (IMDUR) 30 MG extended release tablet Take 2 tablets by mouth daily 60 tablet 3    Multiple Vitamins-Minerals (THERAPEUTIC MULTIVITAMIN-MINERALS) tablet Take 1 tablet by mouth daily      atorvastatin (LIPITOR) 80 MG tablet Take 1 tablet by mouth nightly 90 tablet 3    [DISCONTINUED] furosemide (LASIX) 40 MG tablet Take 1 tablet by mouth as needed (for weight grater than 300lbs) 45 tablet 3    Cholecalciferol (VITAMIN D3) 1.25 MG (09419 UT) CAPS 1 po a week 4 capsule 5    traZODone (DESYREL) 100 MG tablet Take 1 tablet by mouth 2 times daily Am pm 60 tablet 5    [DISCONTINUED] benzonatate (TESSALON) 200 MG capsule TAKE 1 CAPSULE BY MOUTH THREE TIMES A DAY AS NEEDED FOR COUGH 30 capsule 1    [DISCONTINUED] DULoxetine (CYMBALTA) 60 MG extended release capsule Take 1 capsule by mouth daily 30 capsule 1    fluticasone-umeclidin-vilant (TRELEGY ELLIPTA) 100-62.5-25 MCG/INH AEPB Inhale 1 puff into the lungs daily 60 each 5    albuterol (PROVENTIL) (2.5 MG/3ML) 0.083% nebulizer solution Take 3 mLs by nebulization every 6 hours as needed for Wheezing or Shortness of Breath 360 each 3    OXYGEN Inhale 2 L into the lungs as needed       nitroGLYCERIN (NITROSTAT) 0.4 MG SL tablet Place 1 tablet under the tongue every 5 minutes as needed for Chest pain 25 tablet 3    clopidogrel (PLAVIX) 75 MG tablet Take 1 tablet by mouth daily 90 tablet 3    aspirin 81 MG EC tablet Take 1 tablet by mouth daily 30 tablet 0    albuterol sulfate HFA (PROVENTIL HFA) 108 (90 Base) MCG/ACT inhaler Inhale 2 puffs into the lungs every 6 hours as needed for Wheezing 1 Inhaler 0        Medications patient taking as of now reconciled against medications ordered at time of hospital discharge: Yes    Chief Complaint   Patient presents with    Follow-Up from Hospital     2 stents placed       History of Present illness - Follow up of Hospital diagnosis(es):   1. Pacemaker    2. ASHD (arteriosclerotic heart disease)    3. CHB (complete heart block) (HCC)    4. Mobitz type I Wenckebach atrioventricular block    5. Ischemic cardiomyopathy    6.  Chronic combined systolic and diastolic congestive heart failure (HCC)    7. Class 3 severe obesity due to excess calories with serious comorbidity and body mass index (BMI) of 45.0 to 49.9 in adult ([address]    8. OANH (obstructive sleep apnea)    9. S/P three vessel coronary artery bypass    10. Chronic obstructive pulmonary disease, unspecified COPD type [facility])          Inpatient course: Discharge summary reviewed- see chart. Interval history/Current status: The patient states that he is having a second opinion from another cardiologist due to the recommendation to go back in and have open heart surgery/CABG to open up 1 in the posterior branches that has blocked again  Other than that the patient continues to have fatigue as well as chronic dyspnea    A comprehensive review of systems was negative except for what was noted in the HPI. Vitals:    01/19/22 1123   BP: 126/68   Site: Left Upper Arm   Position: Sitting   Cuff Size: Large Adult   Pulse: 84   SpO2: 98%   Weight: 298 lb (135.2 kg)   Height: 5' 4\" (1.626 m)     Body mass index is 51.15 kg/m².    Wt Readings from Last 3 Encounters:   01/19/22 298 lb (135.2 kg)   01/06/22 (!) 307 lb (139.3 kg)   12/30/21 (!) 301 lb (136.5 kg)     BP Readings from Last 3 Encounters:   01/19/22 126/68   01/06/22 134/66   12/30/21 (!) 142/73        Physical Exam:  General Appearance: alert and oriented to person, place and time, well developed and well- nourished, in no acute distress  Skin: warm and dry, no rash or erythema  Head: normocephalic and atraumatic  Eyes: pupils equal, round, and reactive to light, extraocular eye movements intact, conjunctivae normal  ENT: tympanic membrane, external ear and ear canal normal bilaterally, nose without deformity, nasal mucosa and turbinates normal without polyps  Neck: supple and non-tender without mass, no thyromegaly or thyroid nodules, no cervical lymphadenopathy  Pulmonary/Chest: clear to auscultation bilaterally- no wheezes, rales or rhonchi, normal air movement, no respiratory distress  Cardiovascular: normal rate, regular rhythm, normal S1 and S2, no murmurs, rubs, clicks, or gallops, distal pulses intact, no carotid bruits  Abdomen: soft, non-tender, non-distended, normal bowel sounds, no masses or organomegaly  Extremities: no cyanosis, clubbing or edema  Musculoskeletal: normal range of motion, no joint swelling, deformity or tenderness  Neurologic: reflexes normal and symmetric, no cranial nerve deficit, gait, coordination and speech normal    Assessment/Plan:  1. Pacemaker  2. ASHD (arteriosclerotic heart disease)  3. CHB (complete heart block) ([facility])  4. Mobitz type I Wenckebach atrioventricular block  5. Ischemic cardiomyopathy  6. Chronic combined systolic and diastolic congestive heart failure (HCC)  7. Class 3 severe obesity due to excess calories with serious comorbidity and body mass index (BMI) of 45.0 to 49.9 in adult ([facility]  8. OANH (obstructive sleep apnea)  9. S/P three vessel coronary artery bypass  10. Chronic obstructive pulmonary disease, unspecified COPD type ([facility]    - NE DISCHARGE MEDS RECONCILED W/ CURRENT OUTPATIENT MED LIST  The patient and I did discuss the cardiologist recommendations and options. At this point he is not wanting to do anything till he has a second opinion from a different cardiologist.  Patient should continue with pulmonology for his chronic dyspnea however a portion of this may be related to his coronary artery disease  · Limit sodium to 2,000 milligrams (mg) a day or less (less than 1 teaspoon of salt a day), including all the salt you eat in cooking or in packaged foods. ·  avoid processed foods (they are high in sodium)  · Avoid fast food and restaurant meals because they tend to be very high in sodium. · Fluid restriction:  None at this time  · Weigh self without clothing at the same time each day.    · Record  Weight and bring to office visits   · Call  if you have a sudden weight gain, such as more

## 2022-01-26 ENCOUNTER — APPOINTMENT (OUTPATIENT)
Dept: CARDIAC REHAB | Age: 62
End: 2022-01-26
Payer: COMMERCIAL

## 2022-01-28 ENCOUNTER — APPOINTMENT (OUTPATIENT)
Dept: CARDIAC REHAB | Age: 62
End: 2022-01-28
Payer: COMMERCIAL

## 2022-02-01 ENCOUNTER — TELEPHONE (OUTPATIENT)
Dept: FAMILY MEDICINE CLINIC | Age: 62
End: 2022-02-01

## 2022-02-01 NOTE — TELEPHONE ENCOUNTER
Patient should proceed first to the emergency room as may need to have catheterization.   Also should have urgent urology follow-up

## 2022-02-01 NOTE — TELEPHONE ENCOUNTER
Pt called stating that he can't urinate, \"there's no pressure\", for about 2 days. States that it's he's starting to feel really uncomfortable.  Call back pt with recommendations [phone]  Routing to Dr. Paula Francisco due to PCP out of office

## 2022-02-01 NOTE — TELEPHONE ENCOUNTER
Patient notified and will go to the ER now. He will call the office if they do not place a urology referral for him at the ER.

## 2022-02-02 NOTE — TELEPHONE ENCOUNTER
Pt met compliance 1/1/22-1/30/22. Compliance scanned for review. Faxed compliance to [facility] as well. Pt has f/u scheduled 4/7/22.

## 2022-02-18 DIAGNOSIS — F32.9 REACTIVE DEPRESSION: ICD-10-CM

## 2022-02-18 DIAGNOSIS — F41.9 ANXIETY: ICD-10-CM

## 2022-02-18 RX ORDER — CLOPIDOGREL BISULFATE 75 MG/1
75 TABLET ORAL DAILY
Qty: 90 TABLET | Refills: 3 | Status: SHIPPED | OUTPATIENT
Start: 2022-02-18 | End: 2022-05-29 | Stop reason: SDUPTHER

## 2022-02-18 RX ORDER — BENZONATATE 200 MG/1
CAPSULE ORAL
Qty: 30 CAPSULE | Refills: 0 | Status: SHIPPED | OUTPATIENT
Start: 2022-02-18 | End: 2022-04-12 | Stop reason: SDUPTHER

## 2022-02-18 RX ORDER — DULOXETIN HYDROCHLORIDE 60 MG/1
60 CAPSULE, DELAYED RELEASE ORAL DAILY
Qty: 30 CAPSULE | Refills: 2 | Status: SHIPPED | OUTPATIENT
Start: 2022-02-18 | End: 2022-03-30 | Stop reason: DRUGHIGH

## 2022-03-07 ENCOUNTER — TELEPHONE (OUTPATIENT)
Dept: CARDIOLOGY CLINIC | Age: 62
End: 2022-03-07

## 2022-03-07 NOTE — TELEPHONE ENCOUNTER
Stable to proceed. Intermediate risk for higher risk PCI. Please forward letter to Dr. Alegre Listen office.  BAO CASTRO

## 2022-03-07 NOTE — TELEPHONE ENCOUNTER
Jennifer Kay, 1960    Cardiac Risk Assessment    DR. Carlos Washington OFFICE IS PT STABLE FROM A CHF PERSPECTIVE/FLUID STATUS   TO PROCEED? What type of procedure are you having?     When is your procedure scheduled for?  3.23.22    Medications to be stopped. NONE REQUESTED BY RN    What physician is performing your procedure?   DR. Don Salinas [facility]     Phone Number:   [phone]    Fax number to send the letter:   [phone]    Cardiologist:   Riri Rowe Appointment:   1.6.22-[place]  12.14.21-[place]    Next Appointment:   [phone]-[place]

## 2022-03-28 RX ORDER — FLUTICASONE FUROATE, UMECLIDINIUM BROMIDE AND VILANTEROL TRIFENATATE 100; 62.5; 25 UG/1; UG/1; UG/1
1 POWDER RESPIRATORY (INHALATION) DAILY
Qty: 60 EACH | Refills: 5 | Status: SHIPPED | OUTPATIENT
Start: 2022-03-28 | End: 2022-04-24 | Stop reason: SDUPTHER

## 2022-03-30 ENCOUNTER — OFFICE VISIT (OUTPATIENT)
Dept: FAMILY MEDICINE CLINIC | Age: 62
End: 2022-03-30
Payer: COMMERCIAL

## 2022-03-30 VITALS
HEART RATE: 92 BPM | DIASTOLIC BLOOD PRESSURE: 76 MMHG | HEIGHT: 64 IN | WEIGHT: 294 LBS | SYSTOLIC BLOOD PRESSURE: 124 MMHG | OXYGEN SATURATION: 96 % | BODY MASS INDEX: 50.19 KG/M2

## 2022-03-30 DIAGNOSIS — E78.2 MIXED HYPERLIPIDEMIA: ICD-10-CM

## 2022-03-30 DIAGNOSIS — G47.33 OSA (OBSTRUCTIVE SLEEP APNEA): ICD-10-CM

## 2022-03-30 DIAGNOSIS — I25.82 CHRONIC TOTAL OCCLUSION OF CORONARY ARTERY: ICD-10-CM

## 2022-03-30 DIAGNOSIS — Z12.2 SCREENING FOR LUNG CANCER: ICD-10-CM

## 2022-03-30 DIAGNOSIS — Z87.891 HISTORY OF TOBACCO USE: ICD-10-CM

## 2022-03-30 DIAGNOSIS — I25.110 CORONARY ARTERY DISEASE INVOLVING NATIVE CORONARY ARTERY OF NATIVE HEART WITH UNSTABLE ANGINA PECTORIS (HCC): Primary | ICD-10-CM

## 2022-03-30 DIAGNOSIS — Z11.4 ENCOUNTER FOR SCREENING FOR HIV: ICD-10-CM

## 2022-03-30 DIAGNOSIS — Z95.820 STATUS POST ANGIOPLASTY WITH STENT: ICD-10-CM

## 2022-03-30 DIAGNOSIS — Z11.59 NEED FOR HEPATITIS C SCREENING TEST: ICD-10-CM

## 2022-03-30 DIAGNOSIS — R05.3 CHRONIC COUGH: ICD-10-CM

## 2022-03-30 DIAGNOSIS — Z09 HOSPITAL DISCHARGE FOLLOW-UP: ICD-10-CM

## 2022-03-30 DIAGNOSIS — F32.9 REACTIVE DEPRESSION: ICD-10-CM

## 2022-03-30 DIAGNOSIS — R06.02 SHORTNESS OF BREATH: ICD-10-CM

## 2022-03-30 PROBLEM — Z72.0 TOBACCO ABUSE: Status: RESOLVED | Noted: 2020-01-06 | Resolved: 2022-03-30

## 2022-03-30 LAB — HEPATITIS C ANTIBODY INTERPRETATION: NORMAL

## 2022-03-30 PROCEDURE — 1111F DSCHRG MED/CURRENT MED MERGE: CPT | Performed by: NURSE PRACTITIONER

## 2022-03-30 PROCEDURE — G8417 CALC BMI ABV UP PARAM F/U: HCPCS | Performed by: NURSE PRACTITIONER

## 2022-03-30 PROCEDURE — 99214 OFFICE O/P EST MOD 30 MIN: CPT | Performed by: NURSE PRACTITIONER

## 2022-03-30 PROCEDURE — G8427 DOCREV CUR MEDS BY ELIG CLIN: HCPCS | Performed by: NURSE PRACTITIONER

## 2022-03-30 PROCEDURE — 1036F TOBACCO NON-USER: CPT | Performed by: NURSE PRACTITIONER

## 2022-03-30 PROCEDURE — G8482 FLU IMMUNIZE ORDER/ADMIN: HCPCS | Performed by: NURSE PRACTITIONER

## 2022-03-30 PROCEDURE — 3017F COLORECTAL CA SCREEN DOC REV: CPT | Performed by: NURSE PRACTITIONER

## 2022-03-30 RX ORDER — BREXPIPRAZOLE 2 MG/1
2 TABLET ORAL DAILY
COMMUNITY
Start: 2022-03-29 | End: 2022-04-07 | Stop reason: CLARIF

## 2022-03-30 RX ORDER — DULOXETIN HYDROCHLORIDE 30 MG/1
90 CAPSULE, DELAYED RELEASE ORAL DAILY
COMMUNITY
Start: 2022-03-29

## 2022-03-30 RX ORDER — LANSOPRAZOLE 30 MG/1
30 CAPSULE, DELAYED RELEASE ORAL DAILY
Qty: 45 CAPSULE | Refills: 0 | Status: SHIPPED | OUTPATIENT
Start: 2022-03-30 | End: 2022-05-18 | Stop reason: SDUPTHER

## 2022-03-30 NOTE — PROGRESS NOTES
Post-Discharge Transitional Care Management Progress Note      Bia Reyna   YOB: 1960    Date of Office Visit:  3/30/2022  Date of Hospital Admission: 03/23/2022  Date of Hospital Discharge: 03/25/2022    Care management risk score Rising risk (score 2-5) and Complex Care (Scores >=6): 3     Non face to face  following discharge, date last encounter closed (first attempt may have been earlier): *No documented post hospital discharge outreach found in the last 14 days *No documented post hospital discharge outreach found in the last 14 days    Call initiated 2 business days of discharge: *No response recorded in the last 14 days    ASSESSMENT/PLAN:   Coronary artery disease involving native coronary artery of native heart with unstable angina pectoris ([address]  -     AR DISCHARGE MEDS RECONCILED W/ CURRENT OUTPATIENT MED LIST  Chronic total occlusion of coronary artery  -     AR DISCHARGE MEDS RECONCILED W/ CURRENT OUTPATIENT MED LIST  Status post angioplasty with stent  -     AR DISCHARGE MEDS RECONCILED W/ CURRENT OUTPATIENT MED LIST  3/23/22  - s/p  PCI to OM eoth ISREAL x4 and PCI to ostial proximal RCA with DESx1 and PCI Ramus branch ISR per Dr. Edgar Aly and Dr. Natalie Green. Bilateral groins with Perclose devices, with failure right Femoral artery Perclose. Manual pressure held. Plan for Same Day Post PCI discharge cancelled due to patient c/o pain in bilateral groins. Currently denies any CP/SOB. ECG shows no significant changes. Patient states that the right groin site is doing very well left groin does have moderate bruising, hematoma and slight discomfort however patient states that it has improved as well   instructed on bleeding risks and precautions. They were instructed for any bleeding from the access site, swelling, sharp pain, or feeling of a \"pop,\" then he will lay flat and the family member will place direct pressure over the access site and dial 911.  They both verbalized understanding of this. . Continue DAPT with aspirin and Plavix. Counseled re: risk factor and lifestyle modification, medication compliance, and medical follow-up.  Follow up with Dr. Xiomara Colon in in [facility] office on 4/7/2022. Shortness of breath  -     SD DISCHARGE MEDS RECONCILED W/ CURRENT OUTPATIENT MED LIST  Patient states that his dyspnea has significantly improved since surgery on 3/23/2022  Chronic cough  Patient does complain of a chronic cough. It has been ongoing since 2020. Patient did have a CTA of the lung done September 2021. There was no PE. Did show COPD which patient is aware of and does see pulmonology. He is currently on Trelegy. He is no longer on the Spiriva. He does continue to use albuterol as needed  Patient denies any signs or symptoms of heartburn. He states he does use the [employer] which helps with the chronic cough however he is concerned that he continues with the cough. He is due for routine lung screening for cancer due to history of tobacco use. Patient states that he continues to be a non-smoker. Maybe related to patient's COPD however will try a PPI. Since he is on Plavix I will avoid omeprazole and Nexium  Studies do show that Prevacid is acceptable and does not plan impact on the effectiveness of Plavix, therefore will do a 6-week trial of the following  -     lansoprazole (PREVACID) 30 MG delayed release capsule; Take 1 capsule by mouth daily, Disp-45 capsule, R-0Normal  OANH (obstructive sleep apnea)  -     SD DISCHARGE MEDS RECONCILED W/ CURRENT OUTPATIENT MED LIST  Mixed hyperlipidemia  -     SD DISCHARGE MEDS RECONCILED W/ CURRENT OUTPATIENT MED LIST  continue statin therapy  Reactive depression  Psychiatrist started patient on rexulti and increased to 2 mg daily and cymbalta increased from 60 mg to 90 mg yesterday. History of tobacco use  Screening for lung cancer  -     Low Dose Chest CT-Abnormal Lung Screen Follow up;  Future  Encounter for screening for HIV  -     HIV Screen  Need for hepatitis C screening test  -     Hepatitis C Antibody  Hospital discharge follow-up  -     OR DISCHARGE MEDS RECONCILED W/ CURRENT OUTPATIENT MED LIST      Medical Decision Making: moderate complexity  Return in about 3 months (around 6/30/2022). Subjective:   HPI:  Follow up of Hospital problems/diagnosis(es):   1. Coronary artery disease involving native coronary artery of native heart with unstable angina pectoris ([address]    2. Chronic total occlusion of coronary artery    3. Status post angioplasty with stent    4. Shortness of breath    5. Chronic cough    6. OANH (obstructive sleep apnea)    7. Mixed hyperlipidemia          Inpatient course: Discharge summary reviewed- see chart. Interval history/Current status: Patient states he is never felt better. He states that he feels like he has more energy than he has in over a year. His dyspnea has improved. His mental state has also improved because he is feeling better. However he is going to continue with psychiatry related to loss in his life and issues that occurred while he was in the [facility]. Patient's psychiatric medications were just increased by the psychiatrist.  Patient states he is tolerating those well. The patient denies abnormal bruising or abnormal bleeding from any body orifice such as bleeding from nose or gums, blood in urine or stool, or melena, hemoptysis or hematemesis.       Patient Active Problem List   Diagnosis    Crush injury of right foot    Shortness of breath    Erectile dysfunction    Hyperglycemia    Anxiety    Depression    History of alcohol abuse    Fatigue    OANH (obstructive sleep apnea)    Hypersomnia    Chronic obstructive pulmonary disease (HCC)    Lumbar radiculopathy    HNP (herniated nucleus pulposus), lumbar    Spondylosis without myelopathy or radiculopathy, lumbar region    DDD (degenerative disc disease), lumbar    Lumbar spine pain    Acute respiratory distress    Class 3 severe obesity with body mass index (BMI) of 45.0 to 49.9 in adult [facility])    Pneumonia, primary atypical    Acute respiratory failure with hypoxia (HCC)    Moderate protein-calorie malnutrition (HCC)    Acute respiratory failure (HCC)    Acute on chronic respiratory failure with hypoxemia (HCC)    Acute diastolic congestive heart failure (HCC)    Hyperlipidemia    Insomnia    Morbid obesity with BMI of 45.0-49.9, adult (HCC)    Abnormal cardiovascular stress test    Coronary artery disease involving native heart    S/P three vessel coronary artery bypass    Acute kidney [facility]    Chronic combined systolic and diastolic congestive heart failure (HCC)    Mobitz type I Wenckebach atrioventricular block    Asystole (HCC)    Ischemic cardiomyopathy    Diverticulitis of colon    Coronary artery disease involving native coronary artery of native heart with unstable angina pectoris (HCC)    CHB (complete heart block) ([facility])    Sinus bradycardia    Cellulitis    Acute deep vein thrombosis (DVT) of left upper extremity ([facility])    Cellulitis of chest wall    Painful orthopaedic hardware ([facility])    SSS (sick sinus syndrome) ([facility])    Observed sleep apnea    ASHD (arteriosclerotic heart disease)    Pacemaker    Chronic total occlusion of coronary artery       Medications listed as ordered at the time of discharge from hospital  Patient Instructions:   Current Discharge Medication List     CONTINUE these medications which have NOT CHANGED   Details   albuterol (PROVENTIL) 2.5 mg /3 mL (0.083 %) nebulization 2.5 mg daily. aspirin 81 mg tablet Take 81 mg by mouth daily. atorvastatin (LIPITOR) 80 mg Tablet daily. benzonatate (TESSALON) 200 mg capsule daily. As needed for cough     brexpiprazole (REXULTI) 1 mg Tablet     cholecalciferol, vitamin D3, (VITAMIN D3 PO) Take 50,000 Units by mouth once weekly. clopidogreL (PLAVIX) 75 mg tablet daily. DULoxetine (CYMBALTA) 60 mg Capsule, Delayed Release(E.C.) Take 60 mg by mouth. furosemide (LASIX) 40 mg tablet Take 40 mg by mouth daily. hydrOXYzine Pamoate (VISTARIL) 25 mg capsule daily. isosorbide mononitrate (IMDUR) 30 mg CR tablet Take 60 mg by mouth.     medical supply, miscellaneous (OXYGEN TUBING) Take 2 L by inhalation. multivitamin (MULTIPLE VITAMINS PO) Take 1 Tablet by mouth. ranolazine (RANEXA) 500 mg Tablet Sustained Release 12 hr Take 500 mg by mouth. traZODone (DESYREL) 100 mg tablet daily. Trelegy Ellipta 100-62.5-25 mcg Disk with Device daily. Medication List          Accurate as of March 30, 2022 10:48 AM. If you have any questions, ask your nurse or doctor.             START taking these medications    lansoprazole 30 MG delayed release capsule  Commonly known as: Prevacid  Take 1 capsule by mouth daily  Started by: ANICETO Dover CNP        CONTINUE taking these medications    * albuterol sulfate  (90 Base) MCG/ACT inhaler  Commonly known as: Proventil HFA  Inhale 2 puffs into the lungs every 6 hours as needed for Wheezing     * albuterol (2.5 MG/3ML) 0.083% nebulizer solution  Commonly known as: PROVENTIL  Take 3 mLs by nebulization every 6 hours as needed for Wheezing or Shortness of Breath     aspirin 81 MG EC tablet  Take 1 tablet by mouth daily     atorvastatin 80 MG tablet  Commonly known as: LIPITOR  Take 1 tablet by mouth nightly     benzonatate 200 MG capsule  Commonly known as: TESSALON  TAKE 1 CAPSULE BY MOUTH THREE TIMES A DAY AS NEEDED FOR COUGH     clopidogrel 75 MG tablet  Commonly known as: PLAVIX  Take 1 tablet by mouth daily     DULoxetine 30 MG extended release capsule  Commonly known as: CYMBALTA     furosemide 40 MG tablet  Commonly known as: LASIX  Take 1 tablet by mouth as needed (for weight grater than 300lbs)     hydrOXYzine 25 MG capsule  Commonly known as: VISTARIL     isosorbide mononitrate 30 MG extended release tablet  Commonly known as: IMDUR  Take 2 tablets by mouth daily     nitroGLYCERIN 0.4 MG SL tablet  Commonly known as: Nitrostat  Place 1 tablet under the tongue every 5 minutes as needed for Chest pain     OXYGEN     ranolazine 500 MG extended release tablet  Commonly known as: Ranexa  Take 1 tablet by mouth 2 times daily     Rexulti 2 MG Tabs tablet  Generic drug: brexpiprazole     therapeutic multivitamin-minerals tablet     traZODone 100 MG tablet  Commonly known as: DESYREL  Take 1 tablet by mouth 2 times daily Am pm     Trelegy Ellipta 100-62.5-25 MCG/INH Aepb  Generic drug: fluticasone-umeclidin-vilant  INHALE 1 PUFF INTO THE LUNGS DAILY     Vitamin D3 1.25 MG (55280 UT) Caps  1 po a week         * This list has 2 medication(s) that are the same as other medications prescribed for you. Read the directions carefully, and ask your doctor or other care provider to review them with you. STOP taking these medications    metoprolol succinate 25 MG extended release tablet  Commonly known as:  Toprol XL  Stopped by: ANICETO Hendrix CNP           Where to Get Your Medications      These medications were sent to [address]  [address] [facility] [address] [id number]    Phone: [phone]   · lansoprazole 30 MG delayed release capsule           Medications marked \"taking\" at this time  Outpatient Medications Marked as Taking for the 3/30/22 encounter (Office Visit) with ANICETO Hendrix CNP   Medication Sig Dispense Refill    lansoprazole (PREVACID) 30 MG delayed release capsule Take 1 capsule by mouth daily 45 capsule 0    TRELEGY ELLIPTA 100-62.5-25 MCG/INH AEPB INHALE 1 PUFF INTO THE LUNGS DAILY 60 each 5    clopidogrel (PLAVIX) 75 MG tablet Take 1 tablet by mouth daily 90 tablet 3    benzonatate (TESSALON) 200 MG capsule TAKE 1 CAPSULE BY MOUTH THREE TIMES A DAY AS NEEDED FOR COUGH 30 capsule 0    furosemide (LASIX) 40 MG tablet Take 1 tablet by mouth as needed (for weight grater than 300lbs) 45 tablet 3    hydrOXYzine (VISTARIL) 25 MG capsule Take 25 mg by mouth in the morning and at bedtime       isosorbide mononitrate (IMDUR) 30 MG extended release tablet Take 2 tablets by mouth daily 60 tablet 3    ranolazine (RANEXA) 500 MG extended release tablet Take 1 tablet by mouth 2 times daily 60 tablet 5    Multiple Vitamins-Minerals (THERAPEUTIC MULTIVITAMIN-MINERALS) tablet Take 1 tablet by mouth daily      atorvastatin (LIPITOR) 80 MG tablet Take 1 tablet by mouth nightly 90 tablet 3    Cholecalciferol (VITAMIN D3) 1.25 MG (17322 UT) CAPS 1 po a week 4 capsule 5    traZODone (DESYREL) 100 MG tablet Take 1 tablet by mouth 2 times daily Am pm 60 tablet 5    albuterol (PROVENTIL) (2.5 MG/3ML) 0.083% nebulizer solution Take 3 mLs by nebulization every 6 hours as needed for Wheezing or Shortness of Breath 360 each 3    OXYGEN Inhale 2 L into the lungs as needed       nitroGLYCERIN (NITROSTAT) 0.4 MG SL tablet Place 1 tablet under the tongue every 5 minutes as needed for Chest pain 25 tablet 3    aspirin 81 MG EC tablet Take 1 tablet by mouth daily 30 tablet 0    albuterol sulfate HFA (PROVENTIL HFA) 108 (90 Base) MCG/ACT inhaler Inhale 2 puffs into the lungs every 6 hours as needed for Wheezing 1 Inhaler 0        Medications patient taking as of now reconciled against medications ordered at time of hospital discharge: Yes    A comprehensive review of systems was negative except for what was noted in the HPI.     Objective:    /76 (Site: Left Upper Arm, Position: Sitting, Cuff Size: Large Adult)   Pulse 92   Ht 5' 4\" (1.626 m)   Wt 294 lb (133.4 kg)   SpO2 96%   BMI 50.46 kg/m²   General Appearance: alert and oriented to person, place and time, well developed and well- nourished, in no acute distress  Skin: warm and dry, no rash or erythema, angio Ambreen Forreston site at the right groin is doing very well left groin insertion site does show a hematoma with moderate amount of bruising and swelling. No symptoms of infection, no drainage, erythema or increased heat. Patient does state bruising has improved  Head: normocephalic and atraumatic  Eyes: pupils equal, round, and reactive to light, extraocular eye movements intact, conjunctivae normal  ENT: tympanic membrane, external ear and ear canal normal bilaterally, nose without deformity, nasal mucosa and turbinates normal without polyps  Neck: supple and non-tender without mass, no thyromegaly or thyroid nodules, no cervical lymphadenopathy  Pulmonary/Chest: clear to auscultation bilaterally- no wheezes, rales or rhonchi, normal air movement, no respiratory distress  Cardiovascular: normal rate, regular rhythm, normal S1 and S2, no murmurs, rubs, clicks, or gallops, distal pulses intact, no carotid bruits  Abdomen: soft, non-tender, non-distended, normal bowel sounds, no masses or organomegaly  Extremities: no cyanosis, clubbing or edema  Musculoskeletal: normal range of motion, no joint swelling, deformity or tenderness  Neurologic: reflexes normal and symmetric, no cranial nerve deficit, gait, coordination and speech normal    An electronic signature was used to authenticate this note.   --Eileen Thomson, ANICETO - CNP

## 2022-03-31 LAB
HIV AG/AB: NORMAL
HIV ANTIGEN: NORMAL
HIV-1 ANTIBODY: NORMAL
HIV-2 AB: NORMAL

## 2022-04-07 ENCOUNTER — OFFICE VISIT (OUTPATIENT)
Dept: PULMONOLOGY | Age: 62
End: 2022-04-07
Payer: COMMERCIAL

## 2022-04-07 VITALS
BODY MASS INDEX: 49.85 KG/M2 | SYSTOLIC BLOOD PRESSURE: 126 MMHG | HEIGHT: 64 IN | HEART RATE: 99 BPM | OXYGEN SATURATION: 97 % | WEIGHT: 292 LBS | RESPIRATION RATE: 16 BRPM | DIASTOLIC BLOOD PRESSURE: 72 MMHG

## 2022-04-07 DIAGNOSIS — G47.33 SEVERE OBSTRUCTIVE SLEEP APNEA: ICD-10-CM

## 2022-04-07 DIAGNOSIS — R06.09 DYSPNEA ON EXERTION: ICD-10-CM

## 2022-04-07 DIAGNOSIS — J44.9 MODERATE COPD (CHRONIC OBSTRUCTIVE PULMONARY DISEASE) (HCC): Primary | ICD-10-CM

## 2022-04-07 PROCEDURE — G8427 DOCREV CUR MEDS BY ELIG CLIN: HCPCS | Performed by: INTERNAL MEDICINE

## 2022-04-07 PROCEDURE — G8417 CALC BMI ABV UP PARAM F/U: HCPCS | Performed by: INTERNAL MEDICINE

## 2022-04-07 PROCEDURE — 3017F COLORECTAL CA SCREEN DOC REV: CPT | Performed by: INTERNAL MEDICINE

## 2022-04-07 PROCEDURE — 3023F SPIROM DOC REV: CPT | Performed by: INTERNAL MEDICINE

## 2022-04-07 PROCEDURE — 1036F TOBACCO NON-USER: CPT | Performed by: INTERNAL MEDICINE

## 2022-04-07 PROCEDURE — 99214 OFFICE O/P EST MOD 30 MIN: CPT | Performed by: INTERNAL MEDICINE

## 2022-04-07 RX ORDER — ALBUTEROL SULFATE 90 UG/1
2 AEROSOL, METERED RESPIRATORY (INHALATION) EVERY 4 HOURS PRN
Qty: 18 G | Refills: 5 | Status: SHIPPED | OUTPATIENT
Start: 2022-04-07 | End: 2022-10-13 | Stop reason: SDUPTHER

## 2022-04-07 ASSESSMENT — SLEEP AND FATIGUE QUESTIONNAIRES
HOW LIKELY ARE YOU TO NOD OFF OR FALL ASLEEP WHEN YOU ARE A PASSENGER IN A CAR FOR AN HOUR WITHOUT A BREAK: 2
HOW LIKELY ARE YOU TO NOD OFF OR FALL ASLEEP WHILE SITTING AND TALKING TO SOMEONE: 1
HOW LIKELY ARE YOU TO NOD OFF OR FALL ASLEEP WHILE SITTING INACTIVE IN A PUBLIC PLACE: 2
ESS TOTAL SCORE: 11
HOW LIKELY ARE YOU TO NOD OFF OR FALL ASLEEP WHILE SITTING QUIETLY AFTER LUNCH WITHOUT ALCOHOL: 1
NECK CIRCUMFERENCE (INCHES): 18
HOW LIKELY ARE YOU TO NOD OFF OR FALL ASLEEP WHILE SITTING AND READING: 1
HOW LIKELY ARE YOU TO NOD OFF OR FALL ASLEEP IN A CAR, WHILE STOPPED FOR A FEW MINUTES IN TRAFFIC: 0
HOW LIKELY ARE YOU TO NOD OFF OR FALL ASLEEP WHILE WATCHING TV: 1
HOW LIKELY ARE YOU TO NOD OFF OR FALL ASLEEP WHILE LYING DOWN TO REST IN THE AFTERNOON WHEN CIRCUMSTANCES PERMIT: 3

## 2022-04-07 NOTE — PROGRESS NOTES
[facility]                                                                CHIEF COMPLAINT: follow up OANH       HPI:   Mask leaving marks on his head does not like. Feels suffocating with the full face mask, he hates it. Feels pressure is high. Patient had stents and feels much better now. Trelegy daily - game changer for him   No hemoptysis   No smoking   Not needing to use O2- O2 sat > 95% al the time     From prior visit:   64years old with chest pain being followed by Dr. Ludwig Ellis here for sleep apnea evaluation and shortness of breath. Wakes up at night choking and gasping for air for 3.5 years, severe at times. Associated with observed sleep apnea and hypersomnia. No snoring. Better when he sits up and worse when laying down. No restorative sleep. + dry mouth upon awakening. Patient is complaining of daytime sleepiness, fatigue and tiredness at times during the day. Bedtime 8-10 pm and rise time is 5:30 am. Sleep onset 60 minutes. Watches TV in bedroom. + morning headache. 1 nocturia. Wakes up 4-5 times at night. 2-3 nap/weekf or 30-45 min. No car wrecks or near wrecks because of the sleepiness. No nodding off while driving. LEE for 3.5 years, mild. Dyspnea worse with exertion and better with resting. Albuterol helps his breathing. Uses 2-3 times/day. Associated with cough and wheezes. Able to walk mail box and back.  Smoker 1 ppd for the past 45 years- smokes now 1.5 ppd     Old records reviewed by me and summarizedc5        Past Medical History:   Diagnosis Date    Acute diverticulitis 04/10/2021    Acute kidney injury ([address] 04/08/2021    Acute on chronic respiratory failure with hypoxemia (HCC)     Acute respiratory failure ([address] 08/19/2020    Acute respiratory failure with hypoxia ([address]     Anxiety 01/06/2020    Aortic valve calcification 09/2020    seen on lung CT    CAD in native artery 04/14/2021    Chronic obstructive pulmonary disease ([facility] 09/03/2019    PFT done 9/03/19    Chronic systolic congestive heart failure ([facility] 04/10/2021    Class 3 severe obesity with body mass index (BMI) of 45.0 to 49.9 in adult [facility])     Coronary artery calcification 09/2020    seen on lung ct    Coronary artery disease involving native heart with angina pectoris ([facility] 09/22/2020    Crush injury of right foot 07/23/2015    DDD (degenerative disc disease), lumbar 01/06/2020    Depression 01/06/2020    Diverticulitis of colon 04/10/2021    Erectile dysfunction 01/06/2020    Fatigue 01/06/2020    HNP (herniated nucleus pulposus), lumbar 01/06/2020    Hyperglycemia 01/06/2020    Hyperlipidemia     Hypersomnia 01/06/2020    Hypertension     Insomnia     Ischemic cardiomyopathy     Kidney stone     Lumbar radiculopathy 01/06/2020    Lumbar spine pain 01/06/2020    LVH (left ventricular hypertrophy)     seen on echo 8/2020, moderate    Mobitz type I Wenckebach atrioventricular block 04/10/2021    Moderate protein-calorie malnutrition ([facility] 03/17/2020    Observed sleep apnea 01/06/2020    OANH (obstructive sleep apnea) 01/06/2020    no C-pap is getting tested    Pneumonia, primary atypical     Reactive depression 01/06/2020    S/P three vessel coronary artery bypass 10/26/2020    Spondylosis without myelopathy or radiculopathy, lumbar region 01/06/2020    Tobacco abuse 01/06/2020    Tobacco use 01/06/2020    Wears dentures     full set       Past Surgical History:        Procedure Laterality Date    ANKLE SURGERY Left 8/2/2021    LEFT FOOT SCREW REMOVAL performed by Rosario Miles MD at [facility]  03/2021    CORONARY ARTERY BYPASS GRAFT N/A 9/25/2020    CORONARY ARTERY BYPASS GRAFTING X3, INTERNAL MAMMARY ARTERY, SAPHENOUS VEIN GRAFT, ON PUMP, STERNAL PLATING, 5 LEVEL BILATERAL INTERCOSTAL NERVE BLOCK, PLATELET GEL APPLICATION performed by Keila Garnett Denise Beverly MD at [address]  03/16/2011    cystoscopy left ureteroscopy, left retrograde, double J stent placement    FINGER AMPUTATION Right 2/2/2021    RIGHT MIDDLE FINGER IRRIGATION AND DEBRIDEMENT WITH REVISION AMPUTATION AND BONE SHORTENING, POSSIBLE NAIL ABLATION performed [facility]  Right 12/24/2019    RIGHT LUMBAR FIVE SACRAL ONE TRANSFORAMINAL EPIDURAL STEROID INJECTION SITE CONFIRMED BY FLUOROSCOPY performed by Suly Li MD at [facility] Right 1/7/2020    RIGHT LUMBAR FOUR AND LUMBAR FIVE TRANSFORAMINAL EPIDURAL STEROID INJECTION SITE CONFIRMED BY FLUOROSCOPY performed by Suly Li MD at [facility]       Allergies:  is allergic to dilaudid [hydromorphone hcl] and codeine. Social History:    TOBACCO:   reports that he quit smoking about 2 years ago. His smoking use included cigarettes. He has a 70.00 pack-year smoking history. He has never used smokeless tobacco.  ETOH:   reports no history of alcohol use.       Family History:       Problem Relation Age of Onset    Heart Disease Mother     Other Mother         copd    High Blood Pressure Sister     No Known Problems Sister        Current Medications:    Current Outpatient Medications:     lansoprazole (PREVACID) 30 MG delayed release capsule, Take 1 capsule by mouth daily, Disp: 45 capsule, Rfl: 0    DULoxetine (CYMBALTA) 30 MG extended release capsule, Take 90 mg by mouth daily, Disp: , Rfl:     TRELEGY ELLIPTA 100-62.5-25 MCG/INH AEPB, INHALE 1 PUFF INTO THE LUNGS DAILY, Disp: 60 each, Rfl: 5    clopidogrel (PLAVIX) 75 MG tablet, Take 1 tablet by mouth daily, Disp: 90 tablet, Rfl: 3    benzonatate (TESSALON) 200 MG capsule, TAKE 1 CAPSULE BY MOUTH THREE TIMES A DAY AS NEEDED FOR COUGH, Disp: 30 capsule, Rfl: 0    furosemide (LASIX) 40 MG tablet, Take 1 tablet by mouth as needed (for weight grater than 300lbs), Disp: 45 tablet, Rfl: 3    hydrOXYzine (VISTARIL) 25 MG capsule, Take 25 mg by mouth in the morning and at bedtime , Disp: , Rfl:     isosorbide mononitrate (IMDUR) 30 MG extended release tablet, Take 2 tablets by mouth daily, Disp: 60 tablet, Rfl: 3    ranolazine (RANEXA) 500 MG extended release tablet, Take 1 tablet by mouth 2 times daily, Disp: 60 tablet, Rfl: 5    Multiple Vitamins-Minerals (THERAPEUTIC MULTIVITAMIN-MINERALS) tablet, Take 1 tablet by mouth daily, Disp: , Rfl:     atorvastatin (LIPITOR) 80 MG tablet, Take 1 tablet by mouth nightly, Disp: 90 tablet, Rfl: 3    Cholecalciferol (VITAMIN D3) 1.25 MG (53050 UT) CAPS, 1 po a week, Disp: 4 capsule, Rfl: 5    traZODone (DESYREL) 100 MG tablet, Take 1 tablet by mouth 2 times daily Am pm, Disp: 60 tablet, Rfl: 5    albuterol (PROVENTIL) (2.5 MG/3ML) 0.083% nebulizer solution, Take 3 mLs by nebulization every 6 hours as needed for Wheezing or Shortness of Breath, Disp: 360 each, Rfl: 3    OXYGEN, Inhale 2 L into the lungs as needed , Disp: , Rfl:     nitroGLYCERIN (NITROSTAT) 0.4 MG SL tablet, Place 1 tablet under the tongue every 5 minutes as needed for Chest pain, Disp: 25 tablet, Rfl: 3    aspirin 81 MG EC tablet, Take 1 tablet by mouth daily, Disp: 30 tablet, Rfl: 0    albuterol sulfate HFA (PROVENTIL HFA) 108 (90 Base) MCG/ACT inhaler, Inhale 2 puffs into the lungs every 6 hours as needed for Wheezing, Disp: 1 Inhaler, Rfl: 0      Objective:   PHYSICAL EXAM:    /72 (Site: Left Upper Arm, Position: Sitting, Cuff Size: Large Adult)   Pulse 99   Resp 16   Ht 5' 4\" (1.626 m)   Wt 292 lb (132.5 kg)   SpO2 97% Comment: RA  BMI 50.12 kg/m²  on  RA   Gen: No distress. Eyes: PERRL. No sclera icterus. No conjunctival injection. ENT: No discharge. Pharynx clear. Mallampati class IV. Neck: Trachea midline. No obvious mass. Resp: No accessory muscle use. Minimal crackles. No wheezes. No rhonchi. No dullness on percussion. Good air entry.    CV: Regular rate. Regular rhythm. No murmur or rub. 1 + LE edema. GI: Non-tender. Non-distended. No hernia. Skin: Warm and dry. No nodule on exposed extremities. Lymph: No cervical LAD. No supraclavicular LAD. M/S: No cyanosis. No joint deformity. No clubbing. Neuro: Awake. Alert. Moves all four extremities. Psych: Oriented x 3. No anxiety. DATA reviewed by me:   PFTs 07/26/2021 FVC 2.82 (70%) FEV1 1.73 (57%) FEV1/FVC 61% TLC 5.40 (87%) DLCO 18.36 (65%)  PFTs 09/30/2020 FVC 2.61 (68%) FEV1 1.96 (66%) FEV1/FVC 75% TLC 6.36 (105%) DLCO 22.12 (81%) 6MW F Res Ex    CT chest 9/18/2020   No PE or aortic dissection  No adenopathy  No infiltrates or effusion    CTPA 4/21/2021  Pulmonary Arteries: Motion artifact degrades image quality. There is no  acute pulmonary thromboembolus. Mediastinum: Status post median sternotomy and CABG. There are no enlarged  thoracic lymph nodes. Lungs/pleura: Secretions layer within the mid trachea. There is no  pneumothorax. There is a trace left pleural effusion bibasilar atelectasis. No change in the multiple 3 mm solid bilateral upper lobe pulmonary nodules,  no follow-up imaging is recommended. Upper Abdomen: Status post cholecystectomy. Soft Tissues/Bones: Degenerative changes involve the thoracic spine. There  is an old healed left rib fracture. Moderate subcutaneous inflammatory stranding is present throughout the left  upper chest wall and axilla with mild associated skin thickening. There is  no drainable fluid collection. CTPA 9/5/2021 imaging reviewed by me and showed  No PE  Mildly dilated thoracic aorta  Post CABG  No mediastinal adenopathy  COPD with no acute abnormalities          LE doppler 4/29/21 negative DVT       TSH 0.9    HST 10/4/2021 AHI 50.9 and desaturation 76%. Saturation below 89% in 300 minutes minutes  CPAP titration 10/19/2021 BiPAP 19/14. BiPAP data reviewed by me. Uses 5-6  hrs/night with 60% compliance and AHI of  0.7. BiPAP 19/14          Assessment:       · Moderate COPD  · Severe OANH. BiPAP 19/14 cmH2O. Poor compliance   · Dyspnea on exertion-multifactorial due to obesity, deconditioning,  underlying COPD and cardiac conditions  · Bradycardia, AV block, ischemic cardiomyopathy post CABG September 2020 and [address] 12/22/21 required stents and PCI, [facility] 3/23/22 underwent 5 stens followed by cardiology  · 45 pack year smoking- Quit March 2020         Plan:       Continue Trelegy and Albuterol INH/Neb Q 4 hrs PRN   O2 0-2 LPM on exertion. Advised to titrate O2 using her pulse oximeter- target O2 sat 90-92%. Advised to get his Covid, Pneumococcal vaccine and influenza vaccine this year   Advised to continue with smoking cessation  Order for nasal mask with mask fitting   Decrease BiPAP 17/12  Continue BiPAP 6-8 hrs at night and during naps. Replacement of mask, tubing, head straps every 3-6 months or sooner if damaged. Follow up BIPAP compliance and pressure adjustment if needed  Sleep hygiene  Avoid sedatives, alcohol and caffeinated drinks at bed time. No driving motorized vehicles or operating heavy machinery while fatigue, drowsy or sleepy. Weight loss is also recommended as a long-term intervention. Follow-up in 3 to 6 months.

## 2022-04-08 ENCOUNTER — HOSPITAL ENCOUNTER (OUTPATIENT)
Dept: CT IMAGING | Age: 62
Discharge: HOME | End: 2022-04-08
Payer: COMMERCIAL

## 2022-04-08 DIAGNOSIS — Z12.2 SCREENING FOR LUNG CANCER: ICD-10-CM

## 2022-04-08 PROCEDURE — 71250 CT THORAX DX C-: CPT

## 2022-04-11 DIAGNOSIS — J44.9 MODERATE COPD (CHRONIC OBSTRUCTIVE PULMONARY DISEASE) (HCC): Primary | ICD-10-CM

## 2022-04-11 DIAGNOSIS — R06.02 SOB (SHORTNESS OF BREATH): ICD-10-CM

## 2022-04-11 RX ORDER — ALBUTEROL SULFATE 2.5 MG/3ML
2.5 SOLUTION RESPIRATORY (INHALATION) EVERY 6 HOURS PRN
Qty: 360 EACH | Refills: 5 | Status: SHIPPED | OUTPATIENT
Start: 2022-04-11 | End: 2022-10-13 | Stop reason: SDUPTHER

## 2022-04-11 NOTE — TELEPHONE ENCOUNTER
Pt called in stating [facility] in [place] is no longer in business. Pt is requesting albuterol sulfate to be sent into [address] [place] [address] located in [place]. Pt also stating he completed his lung screen on 4/08/2022.

## 2022-04-12 ENCOUNTER — NURSE ONLY (OUTPATIENT)
Dept: CARDIOLOGY CLINIC | Age: 62
End: 2022-04-12
Payer: COMMERCIAL

## 2022-04-12 DIAGNOSIS — Z95.0 PACEMAKER: ICD-10-CM

## 2022-04-12 DIAGNOSIS — I44.2 CHB (COMPLETE HEART BLOCK) (HCC): ICD-10-CM

## 2022-04-12 DIAGNOSIS — E78.2 MIXED HYPERLIPIDEMIA: Primary | ICD-10-CM

## 2022-04-12 NOTE — PROGRESS NOTES
Remote transmission received for patient's dual chamber PACEMAKER. Transmission shows normal sensing and pacing function. EP physician will review. See interrogation under the cardiology tab in the [address] [place] [address] [address] field for more details. Will continue to monitor remotely. Episodes Since: 06-Jan-2022  2 Non-sustained VT  2 SVT-ST > 150 bpm, longest 6 min. Pacing (% of Time Since 06-Jan-2022)   13.4% (MVP On)  AP 3.7%    Echo 16/0994-XD systolic function is normal with EF estimated at 55%. . (ASA, Plavix).

## 2022-04-13 RX ORDER — BENZONATATE 200 MG/1
CAPSULE ORAL
Qty: 30 CAPSULE | Refills: 0 | Status: SHIPPED | OUTPATIENT
Start: 2022-04-13 | End: 2022-05-18 | Stop reason: SDUPTHER

## 2022-04-13 RX ORDER — ATORVASTATIN CALCIUM 80 MG/1
80 TABLET, FILM COATED ORAL NIGHTLY
Qty: 90 TABLET | Refills: 3 | Status: SHIPPED | OUTPATIENT
Start: 2022-04-13

## 2022-04-15 PROCEDURE — 93294 REM INTERROG EVL PM/LDLS PM: CPT | Performed by: INTERNAL MEDICINE

## 2022-04-15 PROCEDURE — 93296 REM INTERROG EVL PM/IDS: CPT | Performed by: INTERNAL MEDICINE

## 2022-04-20 ENCOUNTER — HOSPITAL ENCOUNTER (OUTPATIENT)
Dept: CARDIAC REHAB | Age: 62
Setting detail: THERAPIES SERIES
Discharge: HOME | End: 2022-04-20
Payer: COMMERCIAL

## 2022-04-24 DIAGNOSIS — E55.9 VITAMIN D DEFICIENCY: ICD-10-CM

## 2022-04-25 ENCOUNTER — HOSPITAL ENCOUNTER (OUTPATIENT)
Dept: CARDIAC REHAB | Age: 62
Setting detail: THERAPIES SERIES
Discharge: HOME | End: 2022-04-25
Payer: COMMERCIAL

## 2022-04-25 PROCEDURE — 93798 PHYS/QHP OP CAR RHAB W/ECG: CPT

## 2022-04-25 RX ORDER — CHOLECALCIFEROL (VITAMIN D3) 1250 MCG
CAPSULE ORAL
Qty: 4 CAPSULE | Refills: 5 | Status: SHIPPED | OUTPATIENT
Start: 2022-04-25

## 2022-04-25 RX ORDER — FLUTICASONE FUROATE, UMECLIDINIUM BROMIDE AND VILANTEROL TRIFENATATE 100; 62.5; 25 UG/1; UG/1; UG/1
1 POWDER RESPIRATORY (INHALATION) DAILY
Qty: 60 EACH | Refills: 5 | Status: SHIPPED | OUTPATIENT
Start: 2022-04-25 | End: 2022-10-13 | Stop reason: SDUPTHER

## 2022-04-25 NOTE — TELEPHONE ENCOUNTER
Pt former pharmacy shut down [facility]) now wanting Rx sent to [facility]    LOV: 4/7/22      Assessment:       · Moderate COPD  · Severe OANH. BiPAP 19/14 cmH2O. Poor compliance   · Dyspnea on exertion-multifactorial due to obesity, deconditioning,  underlying COPD and cardiac conditions  · Bradycardia, AV block, ischemic cardiomyopathy post CABG September 2020 and [facility] 12/22/21 required stents and PCI, [facility] 3/23/22 underwent 5 stens followed by cardiology  · 45 pack year smoking- Quit March 2020          Plan:       · Continue Trelegy and Albuterol INH/Neb Q 4 hrs PRN   · O2 0-2 LPM on exertion. Advised to titrate O2 using her pulse oximeter- target O2 sat 90-92%. · Advised to get his Covid, Pneumococcal vaccine and influenza vaccine this year   · Advised to continue with smoking cessation  · Order for nasal mask with mask fitting   · Decrease BiPAP 17/12  · Continue BiPAP 6-8 hrs at night and during naps. · Replacement of mask, tubing, head straps every 3-6 months or sooner if damaged. · Follow up BIPAP compliance and pressure adjustment if needed  · Sleep hygiene  · Avoid sedatives, alcohol and caffeinated drinks at bed time. · No driving motorized vehicles or operating heavy machinery while fatigue, drowsy or sleepy. · Weight loss is also recommended as a long-term intervention.     · Follow-up in 3 to 6 months.

## 2022-04-27 ENCOUNTER — HOSPITAL ENCOUNTER (OUTPATIENT)
Dept: CARDIAC REHAB | Age: 62
Setting detail: THERAPIES SERIES
Discharge: HOME | End: 2022-04-27
Payer: COMMERCIAL

## 2022-04-27 PROCEDURE — 93798 PHYS/QHP OP CAR RHAB W/ECG: CPT

## 2022-04-29 ENCOUNTER — HOSPITAL ENCOUNTER (OUTPATIENT)
Dept: CARDIAC REHAB | Age: 62
Setting detail: THERAPIES SERIES
Discharge: HOME | End: 2022-04-29
Payer: COMMERCIAL

## 2022-04-29 PROCEDURE — 93798 PHYS/QHP OP CAR RHAB W/ECG: CPT

## 2022-05-02 ENCOUNTER — HOSPITAL ENCOUNTER (OUTPATIENT)
Dept: CARDIAC REHAB | Age: 62
Setting detail: THERAPIES SERIES
Discharge: HOME | End: 2022-05-02
Payer: COMMERCIAL

## 2022-05-02 PROCEDURE — 93798 PHYS/QHP OP CAR RHAB W/ECG: CPT

## 2022-05-04 ENCOUNTER — TELEPHONE (OUTPATIENT)
Dept: FAMILY MEDICINE CLINIC | Age: 62
End: 2022-05-04

## 2022-05-04 ENCOUNTER — HOSPITAL ENCOUNTER (OUTPATIENT)
Dept: CARDIAC REHAB | Age: 62
Setting detail: THERAPIES SERIES
Discharge: HOME | End: 2022-05-04
Payer: COMMERCIAL

## 2022-05-04 PROCEDURE — 93798 PHYS/QHP OP CAR RHAB W/ECG: CPT

## 2022-05-04 RX ORDER — DEXTROMETHORPHAN HYDROBROMIDE AND PROMETHAZINE HYDROCHLORIDE 15; 6.25 MG/5ML; MG/5ML
5 SYRUP ORAL 4 TIMES DAILY PRN
Qty: 180 ML | Refills: 0 | Status: SHIPPED | OUTPATIENT
Start: 2022-05-04 | End: 2022-05-11

## 2022-05-04 NOTE — TELEPHONE ENCOUNTER
Pt states that the tessalon pearls has not helped with his cough. States that the cough will not stop. Was wanting to see if he could get something else called in to [address]

## 2022-05-06 ENCOUNTER — HOSPITAL ENCOUNTER (OUTPATIENT)
Dept: CARDIAC REHAB | Age: 62
Setting detail: THERAPIES SERIES
Discharge: HOME | End: 2022-05-06
Payer: COMMERCIAL

## 2022-05-06 ENCOUNTER — TELEPHONE (OUTPATIENT)
Dept: FAMILY MEDICINE CLINIC | Age: 62
End: 2022-05-06

## 2022-05-06 PROCEDURE — 93798 PHYS/QHP OP CAR RHAB W/ECG: CPT

## 2022-05-06 NOTE — TELEPHONE ENCOUNTER
Pt states that he has a knot on the left side of his throat. States that it moves and it is the side of a miniature golf ball. He says that it hurts all of the time and has had it there for 4 to 5 days.

## 2022-05-09 ENCOUNTER — HOSPITAL ENCOUNTER (OUTPATIENT)
Dept: CARDIAC REHAB | Age: 62
Setting detail: THERAPIES SERIES
Discharge: HOME | End: 2022-05-09
Payer: COMMERCIAL

## 2022-05-09 ENCOUNTER — OFFICE VISIT (OUTPATIENT)
Dept: FAMILY MEDICINE CLINIC | Age: 62
End: 2022-05-09
Payer: COMMERCIAL

## 2022-05-09 VITALS
HEIGHT: 64 IN | HEART RATE: 96 BPM | WEIGHT: 285 LBS | SYSTOLIC BLOOD PRESSURE: 122 MMHG | BODY MASS INDEX: 48.65 KG/M2 | OXYGEN SATURATION: 98 % | DIASTOLIC BLOOD PRESSURE: 74 MMHG

## 2022-05-09 DIAGNOSIS — R53.83 FATIGUE, UNSPECIFIED TYPE: ICD-10-CM

## 2022-05-09 DIAGNOSIS — R51.9 NONINTRACTABLE HEADACHE, UNSPECIFIED CHRONICITY PATTERN, UNSPECIFIED HEADACHE TYPE: ICD-10-CM

## 2022-05-09 DIAGNOSIS — R59.1 LYMPHADENOPATHY OF HEAD AND NECK: Primary | ICD-10-CM
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
## 2022-05-09 PROCEDURE — 93798 PHYS/QHP OP CAR RHAB W/ECG: CPT

## 2022-05-09 PROCEDURE — 3017F COLORECTAL CA SCREEN DOC REV: CPT | Performed by: NURSE PRACTITIONER

## 2022-05-09 PROCEDURE — 99213 OFFICE O/P EST LOW 20 MIN: CPT | Performed by: NURSE PRACTITIONER

## 2022-05-09 PROCEDURE — 36415 COLL VENOUS BLD VENIPUNCTURE: CPT | Performed by: NURSE PRACTITIONER

## 2022-05-09 PROCEDURE — G8427 DOCREV CUR MEDS BY ELIG CLIN: HCPCS | Performed by: NURSE PRACTITIONER

## 2022-05-09 PROCEDURE — G8417 CALC BMI ABV UP PARAM F/U: HCPCS | Performed by: NURSE PRACTITIONER

## 2022-05-09 PROCEDURE — 1036F TOBACCO NON-USER: CPT | Performed by: NURSE PRACTITIONER

## 2022-05-09 RX ORDER — AMOXICILLIN AND CLAVULANATE POTASSIUM 875; 125 MG/1; MG/1
1 TABLET, FILM COATED ORAL 2 TIMES DAILY
Qty: 28 TABLET | Refills: 0 | Status: SHIPPED | OUTPATIENT
Start: 2022-05-09 | End: 2022-05-23

## 2022-05-09 ASSESSMENT — ENCOUNTER SYMPTOMS
RHINORRHEA: 0
COUGH: 0
SORE THROAT: 0
SINUS PRESSURE: 0
SHORTNESS OF BREATH: 0
TROUBLE SWALLOWING: 0
SINUS PAIN: 0
FACIAL SWELLING: 0

## 2022-05-09 NOTE — PROGRESS NOTES
[address]  [address] [facility] [address]  Dept: [phone]  Dept Fax: [phone]  Loc: [phone]    Katey Garcia is a 63 y.o. male who presents today for his medical conditions/complaints as noted below. Katey Garcia is c/o of Other (Knot behing left ear. Noticed 2 weeks ago,  Has gotten big and is painful)       Subjective:     Chief Complaint   Patient presents with    Other     Knot behing left ear. Noticed 2 weeks ago,  Has gotten big and is painful       HPI  The patient complains of a pain and swelling behind the left ear. He states it started a couple weeks ago and has been worsening. He states that it is tender to the touch. He states that it hurts when he runs. He states that he has been drooling at night And having increased salivary secretions  The patient did have a tick on him at some point in time but is unsure if he has had any recently  He states he has been having a constant headache for the last 4 to 5 days and also complains of fatigue over the last couple of weeks  The patient denies any ear pain or ear drainage. He denies any dental problems because he does not have any teeth. He denies any cuts or open sores on his gums. He denies any nasal congestion or sinus pressure or pain. He denies sore throat. He denies trouble swallowing.     Past Medical History:   Diagnosis Date    Acute diverticulitis 04/10/2021    Acute kidney injury ([address] 04/08/2021    Acute on chronic respiratory failure with hypoxemia (HCC)     Acute respiratory failure ([address] 08/19/2020    Acute respiratory failure with hypoxia ([address]     Anxiety 01/06/2020    Aortic valve calcification 09/2020    seen on lung CT    CAD in native artery 04/14/2021    Chronic obstructive pulmonary disease ([address] 09/03/2019    PFT done 9/03/19    Chronic systolic congestive heart failure ([address] 04/10/2021    Class 3 severe obesity with body mass index (BMI) of 45.0 to 49.9 in adult [facility])     Coronary artery calcification 09/2020    seen on lung ct    Coronary artery disease involving native heart with angina pectoris ([facility] 09/22/2020    Crush injury of right foot 07/23/2015    DDD (degenerative disc disease), lumbar 01/06/2020    Depression 01/06/2020    Diverticulitis of colon 04/10/2021    Erectile dysfunction 01/06/2020    Fatigue 01/06/2020    HNP (herniated nucleus pulposus), lumbar 01/06/2020    Hyperglycemia 01/06/2020    Hyperlipidemia     Hypersomnia 01/06/2020    Hypertension     Insomnia     Ischemic cardiomyopathy     Kidney stone     Lumbar radiculopathy 01/06/2020    Lumbar spine pain 01/06/2020    LVH (left ventricular hypertrophy)     seen on echo 8/2020, moderate    Mobitz type I Wenckebach atrioventricular block 04/10/2021    Moderate protein-calorie malnutrition ([facility] 03/17/2020    Observed sleep apnea 01/06/2020    OANH (obstructive sleep apnea) 01/06/2020    no C-pap is getting tested    Pneumonia, primary atypical     Reactive depression 01/06/2020    S/P three vessel coronary artery bypass 10/26/2020    Spondylosis without myelopathy or radiculopathy, lumbar region 01/06/2020    Tobacco abuse 01/06/2020    Tobacco use 01/06/2020    Wears dentures     full set         Review of Systems   Constitutional: Positive for fatigue. Negative for appetite change, chills, fever and unexpected weight change. HENT: Positive for drooling. Negative for congestion, dental problem, ear discharge, ear pain, facial swelling, mouth sores, postnasal drip, rhinorrhea, sinus pressure, sinus pain, sore throat and trouble swallowing. Eyes: Negative. Respiratory: Negative. Negative for cough and shortness of breath. Cardiovascular: Negative. Negative for chest pain, palpitations and leg swelling. Gastrointestinal: Negative. Negative for abdominal pain, anal bleeding, blood in stool and nausea. Endocrine: Negative. Genitourinary: Negative. Negative for hematuria. Musculoskeletal: Negative. Skin: Negative. Negative for rash. Allergic/Immunologic: Negative. Neurological: Positive for headaches. Negative for dizziness, syncope, light-headedness and numbness. Hematological: Positive for adenopathy. Does not bruise/bleed easily. Psychiatric/Behavioral: Negative. All other systems reviewed and are negative.        Past Medical History:   Diagnosis Date    Acute diverticulitis 04/10/2021    Acute kidney injury ([address] 04/08/2021    Acute on chronic respiratory failure with hypoxemia (HCC)     Acute respiratory failure ([address] 08/19/2020    Acute respiratory failure with hypoxia ([address]     Anxiety 01/06/2020    Aortic valve calcification 09/2020    seen on lung CT    CAD in native artery 04/14/2021    Chronic obstructive pulmonary disease ([address] 09/03/2019    PFT done 9/03/19    Chronic systolic congestive heart failure ([address] 04/10/2021    Class 3 severe obesity with body mass index (BMI) of 45.0 to 49.9 in adult [facility])     Coronary artery calcification 09/2020    seen on lung ct    Coronary artery disease involving native heart with angina pectoris ([address] 09/22/2020    Crush injury of right foot 07/23/2015    DDD (degenerative disc disease), lumbar 01/06/2020    Depression 01/06/2020    Diverticulitis of colon 04/10/2021    Erectile dysfunction 01/06/2020    Fatigue 01/06/2020    HNP (herniated nucleus pulposus), lumbar 01/06/2020    Hyperglycemia 01/06/2020    Hyperlipidemia     Hypersomnia 01/06/2020    Hypertension     Insomnia     Ischemic cardiomyopathy     Kidney stone     Lumbar radiculopathy 01/06/2020    Lumbar spine pain 01/06/2020    LVH (left ventricular hypertrophy)     seen on echo 8/2020, moderate    Mobitz type I Wenckebach atrioventricular block 04/10/2021    Moderate protein-calorie malnutrition ([address] 03/17/2020    Observed sleep apnea 01/06/2020    OANH (obstructive sleep apnea) 01/06/2020    no C-pap is getting tested    Pneumonia, primary atypical     Reactive depression 01/06/2020    S/P three vessel coronary artery bypass 10/26/2020    Spondylosis without myelopathy or radiculopathy, lumbar region 01/06/2020    Tobacco abuse 01/06/2020    Tobacco use 01/06/2020    Wears dentures     full set     Family History   Problem Relation Age of Onset    Heart Disease Mother     Other Mother         copd    High Blood Pressure Sister     No Known Problems Sister      Past Surgical History:   Procedure Laterality Date    ANKLE SURGERY Left 8/2/2021    LEFT FOOT SCREW REMOVAL performed by Whitney Romero MD at [address]  03/2021    CORONARY ARTERY BYPASS GRAFT N/A 9/25/2020    CORONARY ARTERY BYPASS GRAFTING X3, INTERNAL MAMMARY ARTERY, SAPHENOUS VEIN GRAFT, ON PUMP, STERNAL PLATING, 5 LEVEL BILATERAL INTERCOSTAL NERVE BLOCK, PLATELET GEL APPLICATION performed by Rosio Rogers MD at [address] [facility]  03/16/2011    cystoscopy left ureteroscopy, left retrograde, double J stent placement    FINGER AMPUTATION Right 2/2/2021    RIGHT MIDDLE FINGER IRRIGATION AND DEBRIDEMENT WITH REVISION AMPUTATION AND BONE SHORTENING, POSSIBLE NAIL ABLATION performed by Latanya De Los Santos MD at [address] [facility] Right 12/24/2019    RIGHT LUMBAR FIVE SACRAL ONE TRANSFORAMINAL EPIDURAL STEROID INJECTION SITE CONFIRMED BY FLUOROSCOPY performed by Suly Li MD at [address] Right 1/7/2020    RIGHT LUMBAR FOUR AND LUMBAR FIVE TRANSFORAMINAL EPIDURAL STEROID INJECTION SITE CONFIRMED BY FLUOROSCOPY performed by Suly Li MD at [address] [address] [address] History     Socioeconomic History    Marital status:      Spouse name: Not on file    Number of children: Not on file    Years of education: Not on file    Highest education level: Not on file Occupational History    Not on file   Tobacco Use    Smoking status: Former Smoker     Packs/day: 2.00     Years: 35.00     Pack years: 70.00     Types: Cigarettes     Quit date: 3/1/2020     Years since quittin.1    Smokeless tobacco: Never Used   Vaping Use    Vaping Use: Never used   Substance and Sexual Activity    Alcohol use: No    Drug use: Not Currently     Types: Marijuana Charlaine Dom), Cocaine     Comment: hx of drug use 30 yrs ago    Sexual activity: Not Currently   Other Topics Concern    Not on file   Social History Narrative    Not on file     Social Determinants of Health     Financial Resource Strain: Low Risk     Difficulty of Paying Living Expenses: Not hard at all   Food Insecurity: No Food Insecurity    Worried About Running Out of Food in the Last Year: Never true    Theresa of Food in the Last Year: Never true   Transportation Needs:     Lack of Transportation (Medical): Not on file    Lack of Transportation (Non-Medical):  Not on file   Physical Activity:     Days of Exercise per Week: Not on file    Minutes of Exercise per Session: Not on file   Stress:     Feeling of Stress : Not on file   Social Connections:     Frequency of Communication with Friends and Family: Not on file    Frequency of Social Gatherings with Friends and Family: Not on file    Attends Mu-ism Services: Not on file    Active Member of [address] [place] [address] or Organizations: Not on file    Attends Club or Organization Meetings: Not on file    Marital Status: Not on file   Intimate Partner Violence:     Fear of Current or Ex-Partner: Not on file    Emotionally Abused: Not on file    Physically Abused: Not on file    Sexually Abused: Not on file   Housing Stability:     Unable to Pay for Housing in the Last Year: Not on file    Number of [place] in the Last Year: Not on file    Unstable Housing in the Last Year: Not on file     Current Outpatient Medications   Medication Sig Dispense Refill    promethazine-dextromethorphan (PROMETHAZINE-DM) 6.25-15 MG/5ML syrup Take 5 mLs by mouth 4 times daily as needed for Cough 180 mL 0    fluticasone-umeclidin-vilant (TRELEGY ELLIPTA) 100-62.5-25 MCG/INH AEPB Inhale 1 puff into the lungs daily 60 each 5    Cholecalciferol (VITAMIN D3) 1.25 MG (31937 UT) CAPS 1 po a week 4 capsule 5    atorvastatin (LIPITOR) 80 MG tablet Take 1 tablet by mouth nightly 90 tablet 3    benzonatate (TESSALON) 200 MG capsule TAKE 1 CAPSULE BY MOUTH THREE TIMES A DAY AS NEEDED FOR COUGH 30 capsule 0    albuterol (PROVENTIL) (2.5 MG/3ML) 0.083% nebulizer solution Take 3 mLs by nebulization every 6 hours as needed for Wheezing or Shortness of Breath 360 each 5    albuterol sulfate HFA (PROVENTIL HFA) 108 (90 Base) MCG/ACT inhaler Inhale 2 puffs into the lungs every 4 hours as needed for Wheezing or Shortness of Breath 18 g 5    lansoprazole (PREVACID) 30 MG delayed release capsule Take 1 capsule by mouth daily 45 capsule 0    DULoxetine (CYMBALTA) 30 MG extended release capsule Take 90 mg by mouth daily      clopidogrel (PLAVIX) 75 MG tablet Take 1 tablet by mouth daily 90 tablet 3    furosemide (LASIX) 40 MG tablet Take 1 tablet by mouth as needed (for weight grater than 300lbs) 45 tablet 3    hydrOXYzine (VISTARIL) 25 MG capsule Take 25 mg by mouth in the morning and at bedtime       isosorbide mononitrate (IMDUR) 30 MG extended release tablet Take 2 tablets by mouth daily 60 tablet 3    Multiple Vitamins-Minerals (THERAPEUTIC MULTIVITAMIN-MINERALS) tablet Take 1 tablet by mouth daily      traZODone (DESYREL) 100 MG tablet Take 1 tablet by mouth 2 times daily Am pm 60 tablet 5    OXYGEN Inhale 2 L into the lungs as needed       nitroGLYCERIN (NITROSTAT) 0.4 MG SL tablet Place 1 tablet under the tongue every 5 minutes as needed for Chest pain 25 tablet 3    aspirin 81 MG EC tablet Take 1 tablet by mouth daily 30 tablet 0    ranolazine (RANEXA) 500 MG extended release tablet Take 1 tablet by mouth 2 times daily 60 tablet 5     No current facility-administered medications for this visit. No changes in past medical history, past surgical history, social history, orfamily history were noted during the patient encounter unless specifically listed above. All updates of past medical history, past surgical history, social history, or family history were reviewed personally by me duringthe office visit. All problems listed in the assessment are stable unless noted otherwise. Medication profile reviewed personally by me during the office visit. Medication side effects and possible impairments frommedications were discussed as applicable. Objective:     Physical Exam  Vitals and nursing note reviewed. Constitutional:       General: He is not in acute distress. Appearance: Normal appearance. He is well-developed. HENT:      Head: Normocephalic and atraumatic. Right Ear: Tympanic membrane, ear canal and external ear normal.      Left Ear: Tympanic membrane, ear canal and external ear normal.      Nose: Nose normal.   Eyes:      General: Lids are normal.      Conjunctiva/sclera: Conjunctivae normal.      Pupils: Pupils are equal, round, and reactive to light. Neck:      Thyroid: No thyromegaly. Vascular: No carotid bruit or JVD. Cardiovascular:      Rate and Rhythm: Normal rate and regular rhythm. Pulses: Normal pulses. Radial pulses are 2+ on the right side and 2+ on the left side. Dorsalis pedis pulses are 2+ on the right side and 2+ on the left side. Posterior tibial pulses are 2+ on the right side and 2+ on the left side. Heart sounds: Normal heart sounds. No murmur heard. No friction rub. No gallop. Pulmonary:      Effort: Pulmonary effort is normal.      Breath sounds: Normal breath sounds. Abdominal:      General: Bowel sounds are normal.      Palpations: Abdomen is soft. There is no mass. Tenderness:  There is no abdominal tenderness. Musculoskeletal:         General: Normal range of motion. Cervical back: Normal range of motion and neck supple. Lymphadenopathy:      Head:      Right side of head: No submandibular adenopathy. Left side of head: Submandibular, preauricular, posterior auricular and occipital adenopathy present. Cervical: Cervical adenopathy present. Left cervical: Superficial cervical adenopathy present. Skin:     General: Skin is warm and dry. Findings: No lesion or rash. Neurological:      Mental Status: He is alert and oriented to person, place, and time. Gait: Gait normal.   Psychiatric:         Speech: Speech normal.         Behavior: Behavior normal.         Thought Content: Thought content normal.         Judgment: Judgment normal.         /74 (Site: Left Upper Arm, Position: Sitting, Cuff Size: Large Adult)   Pulse 96   Ht 5' 4\" (1.626 m)   Wt 285 lb (129.3 kg)   SpO2 98%   BMI 48.92 kg/m²   Body mass index is 48.92 kg/m². BP Readings from Last 2 Encounters:   05/09/22 122/74   04/07/22 126/72       Wt Readings from Last 3 Encounters:   05/09/22 285 lb (129.3 kg)   04/07/22 292 lb (132.5 kg)   03/30/22 294 lb (133.4 kg)       Lab Review   Office Visit on 03/30/2022   Component Date Value    HIV Ag/Ab 03/30/2022 Non-Reactive     HIV-1 Antibody 03/30/2022 Non-Reactive     HIV ANTIGEN 03/30/2022 Non-Reactive     HIV-2 Ab 03/30/2022 Non-Reactive     Hep C Ab Interp 03/30/2022 Non-reactive        No results found for this visit on 05/09/22. Assessment:       1. Lymphadenopathy of head and neck    2. Fatigue, unspecified type    3. Tick bite, unspecified site, initial encounter    4. Nonintractable headache, unspecified chronicity pattern, unspecified headache type        No results found for this visit on 05/09/22. Plan:       Joo Damon was seen today for other.     Diagnoses and all orders for this visit:    Lymphadenopathy of head and neck  Fatigue, unspecified type  Tick bite, unspecified site, initial encounter  Nonintractable headache, unspecified chronicity pattern, unspecified headache type  Patient has significant lymphadenopathy on the left. There is no problem with his ears. He is most tender around the parotid and salivary glands. Most likely is infection of the parotid gland or salivary gland    -     CBC with Auto Differential  -     Lyme Disease AB Total W Rflx to IgG/ IgM  -     RICKETTSIA RICKETTSII ([place] SPOTTED FEVER - RMSF) ANTIBODIES IGG & IGM BY IFA  Start   -     amoxicillin-clavulanate (AUGMENTIN) 875-125 MG per tablet; Take 1 tablet by mouth 2 times daily for 14 days Take with meals. Educated patient to Increase Saliva production by doing the following  Increase fluid intake  Lemon drops to increase Saliva secretion  Stop any OTC Anticholinergics and other Xerostomia causes   Symptomatic therapy as follows  Analgesics  Warm compresses over affected Salivary Gland  Attempt to milk gland of discharge    Otolaryngology Consultation to be considered and educated patient that surgical drainage may be required  Call if no improvement in 3-4 days  Also consider CT of the head and neck if no improvement    Patient has been instructed call the office immediately with new symptoms, change in symptoms or worseningof symptoms. If this is not feasible, patient is instructed to report to the emergency room. Medication profile reviewed. Medication side effects and possible impairments from medications were discussed as applicable. Allergies were reviewed. Health maintenance was reviewed and updated as appropriate. Return if symptoms worsen or fail to improve.     (Comment: Please note this report has been produced using a combination of typing and speech recognition software and may contain errors related to that system including errors in grammar, punctuation, and spelling, as well as words and phrases that may be inappropriate.  If there are any questions or concerns please feel free to contact the dictating provider for clarification.)

## 2022-05-09 NOTE — PATIENT INSTRUCTIONS
Patient Education        Salivary Gland Infection: Care Instructions  Overview     Salivary glands make saliva, or spit. An infection in these glands can make theglands swell and hurt. An infection can happen when bacteria gets into the gland. This is more common in people who have diabetes, poor tooth care, or stones in these glands. Bacteria can build up and cause an infection if you don't get enough fluids. It can also happen if the flow of saliva gets blocked by a small stone in thegland. A virus can also cause an infection. Your care depends on the cause. If the problem is caused by bacteria, yourdoctor may prescribe antibiotics. Home treatment may help. You can drink more fluids or suck on sugar-free lemondrops to increase the flow of saliva. Follow-up care is a key part of your treatment and safety. Be sure to make and go to all appointments, and call your doctor if you are having problems. It's also a good idea to know your test results and keep alist of the medicines you take. How can you care for yourself at home?  If your doctor prescribed antibiotics, take them as directed. Do not stop taking them just because you feel better. You need to take the full course of antibiotics.  Take an over-the-counter pain medicine if needed, such as acetaminophen (Tylenol), ibuprofen (Advil, Motrin), or naproxen (Aleve). Be safe with medicines. Read and follow all instructions on the label.  Do not take two or more pain medicines at the same time unless the doctor told you to. Many pain medicines have acetaminophen, which is Tylenol. Too much acetaminophen (Tylenol) can be harmful.  Drink plenty of fluids. If you have kidney, heart, or liver disease and have to limit fluids, talk with your doctor before you increase the amount of fluids you drink.  Put an ice or heat pack (whichever feels better) on the swollen jaw for 10 to 20 minutes at a time.  Put a thin cloth between the ice or heat pack and your skin.   Suck on ice chips or ice treats such as sugar-free flavored ice pops. Eat soft foods that do not have to be chewed much.  Use sugar-free gum or candies such as lemon drops. They increase saliva.  Avoid over-the-counter medicines that can give you a dry mouth. These medicines include antihistamines, such as diphenhydramine (Benadryl) or chlorpheniramine (Chlor-Trimeton).  Gently massage the infected gland. When should you call for help? Call your doctor now or seek immediate medical care if:     You have symptoms of infection, such as:  ? Increased pain, swelling, warmth, or redness. ? Red streaks leading from the area. ? Pus draining from the area. ? A fever.      You have new pain, or your pain is worse. Watch closely for changes in your health, and be sure to contact your doctor if:     You are not getting better as expected. Where can you learn more? Go to https://Zumeo.com.Fluidigm. org and sign in to your Flirq account. Enter F122 in the nap- Naturally Attached Parents box to learn more about \"Salivary Gland Infection: Care Instructions. \"     If you do not have an account, please click on the \"Sign Up Now\" link. Current as of: September 8, 2021               Content Version: 13.2  © 2443-9286 [employer]. Care instructions adapted under license by [facility] ([facility]). If you have questions about a medical condition or this instruction, always ask your healthcare professional. [place] [address] any warranty or liability for your use of this information.

## 2022-05-10 LAB
BASOPHILS ABSOLUTE: 0 K/UL (ref 0–0.2)
BASOPHILS RELATIVE PERCENT: 0.4 %
EOSINOPHILS ABSOLUTE: 0.2 K/UL (ref 0–0.6)
EOSINOPHILS RELATIVE PERCENT: 1.5 %
HCT VFR BLD CALC: 49.2 % (ref 40.5–52.5)
HEMOGLOBIN: 16 G/DL (ref 13.5–17.5)
LYMPHOCYTES ABSOLUTE: 1.7 K/UL (ref 1–5.1)
LYMPHOCYTES RELATIVE PERCENT: 17.6 %
MCH RBC QN AUTO: 28.8 PG (ref 26–34)
MCHC RBC AUTO-ENTMCNC: 32.6 G/DL (ref 31–36)
MCV RBC AUTO: 88.4 FL (ref 80–100)
MONOCYTES ABSOLUTE: 0.7 K/UL (ref 0–1.3)
MONOCYTES RELATIVE PERCENT: 6.8 %
NEUTROPHILS ABSOLUTE: 7.3 K/UL (ref 1.7–7.7)
NEUTROPHILS RELATIVE PERCENT: 73.7 %
PDW BLD-RTO: 17.4 % (ref 12.4–15.4)
PLATELET # BLD: 197 K/UL (ref 135–450)
PMV BLD AUTO: 7.9 FL (ref 5–10.5)
RBC # BLD: 5.57 M/UL (ref 4.2–5.9)
WBC # BLD: 9.9 K/UL (ref 4–11)

## 2022-05-11 ENCOUNTER — HOSPITAL ENCOUNTER (OUTPATIENT)
Dept: CARDIAC REHAB | Age: 62
Setting detail: THERAPIES SERIES
Discharge: HOME | End: 2022-05-11
Payer: COMMERCIAL

## 2022-05-11 LAB — LYME, EIA: 0.1 LIV (ref 0–1.2)

## 2022-05-11 PROCEDURE — 93798 PHYS/QHP OP CAR RHAB W/ECG: CPT

## 2022-05-12 LAB
ROCKY MOUNTAIN SPOTTED FEVER AB IGM,: NORMAL
ROCKY MOUNTAIN SPOTTED FEVER ANTIBODY IGG: NORMAL

## 2022-05-12 ASSESSMENT — ENCOUNTER SYMPTOMS
ABDOMINAL PAIN: 0
EYES NEGATIVE: 1
ANAL BLEEDING: 0
ALLERGIC/IMMUNOLOGIC NEGATIVE: 1
BLOOD IN STOOL: 0
GASTROINTESTINAL NEGATIVE: 1
NAUSEA: 0
RESPIRATORY NEGATIVE: 1

## 2022-05-13 ENCOUNTER — APPOINTMENT (OUTPATIENT)
Dept: CT IMAGING | Age: 62
End: 2022-05-13
Payer: COMMERCIAL

## 2022-05-13 ENCOUNTER — HOSPITAL ENCOUNTER (OUTPATIENT)
Dept: CARDIAC REHAB | Age: 62
Setting detail: THERAPIES SERIES
Discharge: HOME | End: 2022-05-13
Payer: COMMERCIAL

## 2022-05-13 ENCOUNTER — HOSPITAL ENCOUNTER (EMERGENCY)
Age: 62
Discharge: HOME | End: 2022-05-13
Attending: STUDENT IN AN ORGANIZED HEALTH CARE EDUCATION/TRAINING PROGRAM
Payer: COMMERCIAL

## 2022-05-13 VITALS
TEMPERATURE: 98 F | SYSTOLIC BLOOD PRESSURE: 129 MMHG | OXYGEN SATURATION: 94 % | RESPIRATION RATE: 16 BRPM | WEIGHT: 285 LBS | HEART RATE: 81 BPM | HEIGHT: 64 IN | BODY MASS INDEX: 48.65 KG/M2 | DIASTOLIC BLOOD PRESSURE: 76 MMHG

## 2022-05-13 DIAGNOSIS — M54.2 NECK PAIN ON LEFT SIDE: Primary | ICD-10-CM

## 2022-05-13 LAB
A/G RATIO: 1.2 (ref 1.1–2.2)
ALBUMIN SERPL-MCNC: 3.7 G/DL (ref 3.4–5)
ALP BLD-CCNC: 113 U/L (ref 40–129)
ALT SERPL-CCNC: 28 U/L (ref 10–40)
ANION GAP SERPL CALCULATED.3IONS-SCNC: 8 MMOL/L (ref 3–16)
AST SERPL-CCNC: 20 U/L (ref 15–37)
BASOPHILS ABSOLUTE: 0.1 K/UL (ref 0–0.2)
BASOPHILS RELATIVE PERCENT: 1.1 %
BILIRUB SERPL-MCNC: 0.3 MG/DL (ref 0–1)
BUN BLDV-MCNC: 17 MG/DL (ref 7–20)
CALCIUM SERPL-MCNC: 9.1 MG/DL (ref 8.3–10.6)
CHLORIDE BLD-SCNC: 102 MMOL/L (ref 99–110)
CO2: 29 MMOL/L (ref 21–32)
CREAT SERPL-MCNC: 0.9 MG/DL (ref 0.9–1.3)
EOSINOPHILS ABSOLUTE: 0.2 K/UL (ref 0–0.6)
EOSINOPHILS RELATIVE PERCENT: 2.1 %
GFR AFRICAN AMERICAN: >60
GFR NON-AFRICAN AMERICAN: >60
GLUCOSE BLD-MCNC: 139 MG/DL (ref 70–99)
HCT VFR BLD CALC: 46.5 % (ref 40.5–52.5)
HEMOGLOBIN: 15.6 G/DL (ref 13.5–17.5)
LYMPHOCYTES ABSOLUTE: 1.7 K/UL (ref 1–5.1)
LYMPHOCYTES RELATIVE PERCENT: 15.8 %
MCH RBC QN AUTO: 28.9 PG (ref 26–34)
MCHC RBC AUTO-ENTMCNC: 33.6 G/DL (ref 31–36)
MCV RBC AUTO: 85.8 FL (ref 80–100)
MONOCYTES ABSOLUTE: 0.7 K/UL (ref 0–1.3)
MONOCYTES RELATIVE PERCENT: 6.6 %
NEUTROPHILS ABSOLUTE: 8.1 K/UL (ref 1.7–7.7)
NEUTROPHILS RELATIVE PERCENT: 74.4 %
PDW BLD-RTO: 16.4 % (ref 12.4–15.4)
PLATELET # BLD: 176 K/UL (ref 135–450)
PMV BLD AUTO: 7.7 FL (ref 5–10.5)
POTASSIUM REFLEX MAGNESIUM: 4.6 MMOL/L (ref 3.5–5.1)
RBC # BLD: 5.42 M/UL (ref 4.2–5.9)
SODIUM BLD-SCNC: 139 MMOL/L (ref 136–145)
TOTAL PROTEIN: 6.9 G/DL (ref 6.4–8.2)
WBC # BLD: 10.9 K/UL (ref 4–11)

## 2022-05-13 PROCEDURE — 85025 COMPLETE CBC W/AUTO DIFF WBC: CPT

## 2022-05-13 PROCEDURE — 80053 COMPREHEN METABOLIC PANEL: CPT

## 2022-05-13 PROCEDURE — 99285 EMERGENCY DEPT VISIT HI MDM: CPT

## 2022-05-13 PROCEDURE — 6360000002 HC RX W HCPCS: Performed by: STUDENT IN AN ORGANIZED HEALTH CARE EDUCATION/TRAINING PROGRAM

## 2022-05-13 PROCEDURE — 6360000004 HC RX CONTRAST MEDICATION: Performed by: STUDENT IN AN ORGANIZED HEALTH CARE EDUCATION/TRAINING PROGRAM

## 2022-05-13 PROCEDURE — 93798 PHYS/QHP OP CAR RHAB W/ECG: CPT

## 2022-05-13 PROCEDURE — 96374 THER/PROPH/DIAG INJ IV PUSH: CPT

## 2022-05-13 PROCEDURE — 36415 COLL VENOUS BLD VENIPUNCTURE: CPT

## 2022-05-13 PROCEDURE — 70491 CT SOFT TISSUE NECK W/DYE: CPT

## 2022-05-13 PROCEDURE — 70450 CT HEAD/BRAIN W/O DYE: CPT

## 2022-05-13 RX ORDER — METHOCARBAMOL 500 MG/1
500 TABLET, FILM COATED ORAL 4 TIMES DAILY PRN
Qty: 20 TABLET | Refills: 0 | Status: SHIPPED | OUTPATIENT
Start: 2022-05-13 | End: 2022-05-18

## 2022-05-13 RX ORDER — KETOROLAC TROMETHAMINE 30 MG/ML
15 INJECTION, SOLUTION INTRAMUSCULAR; INTRAVENOUS ONCE
Status: COMPLETED | OUTPATIENT
Start: 2022-05-13 | End: 2022-05-13

## 2022-05-13 RX ADMIN — KETOROLAC TROMETHAMINE 15 MG: 30 INJECTION, SOLUTION INTRAMUSCULAR at 09:41

## 2022-05-13 RX ADMIN — IOPAMIDOL 75 ML: 755 INJECTION, SOLUTION INTRAVENOUS at 10:34

## 2022-05-13 ASSESSMENT — PAIN SCALES - GENERAL
PAINLEVEL_OUTOF10: 3
PAINLEVEL_OUTOF10: 3

## 2022-05-13 ASSESSMENT — PAIN - FUNCTIONAL ASSESSMENT: PAIN_FUNCTIONAL_ASSESSMENT: 0-10

## 2022-05-13 NOTE — ED PROVIDER NOTES
[facility]      CHIEF COMPLAINT  Mass (Pt has a bump behind his L ear that has been there for a couple weeks and just keeps getting bigger. )     HISTORY OF PRESENT ILLNESS  Libertad Smith is a 63 y.o. male  who presents to the ED complaining of pain and swelling behind his left ear, neck pain. Patient also having difficulty sleeping. States that for the past few weeks, he has had pain behind his left ear that is now started to radiate down the left side of his neck. He was seen by his PCP and started on antibiotics and has been taking them for 5 days but feels like he is only getting worse. He denies any associated fevers. States is having difficulty sleeping secondary to the pain. Has not taken anything for his pain. He denies any difficulty swallowing. Denies any other complaints or concerns. No other complaints, modifying factors or associated symptoms. I have reviewed the following from the nursing documentation.     Past Medical History:   Diagnosis Date    Acute diverticulitis 04/10/2021    Acute kidney injury ([address] 04/08/2021    Acute on chronic respiratory failure with hypoxemia (HCC)     Acute respiratory failure ([address] 08/19/2020    Acute respiratory failure with hypoxia ([address]     Anxiety 01/06/2020    Aortic valve calcification 09/2020    seen on lung CT    CAD in native artery 04/14/2021    Chronic obstructive pulmonary disease ([address] 09/03/2019    PFT done 9/03/19    Chronic systolic congestive heart failure ([address] 04/10/2021    Class 3 severe obesity with body mass index (BMI) of 45.0 to 49.9 in adult [facility])     Coronary artery calcification 09/2020    seen on lung ct    Coronary artery disease involving native heart with angina pectoris ([address] 09/22/2020    Crush injury of right foot 07/23/2015    DDD (degenerative disc disease), lumbar 01/06/2020    Depression 01/06/2020    Diverticulitis of colon 04/10/2021    Erectile dysfunction 01/06/2020    Fatigue 01/06/2020    HNP (herniated nucleus pulposus), lumbar 01/06/2020    Hyperglycemia 01/06/2020    Hyperlipidemia     Hypersomnia 01/06/2020    Hypertension     Insomnia     Ischemic cardiomyopathy     Kidney stone     Lumbar radiculopathy 01/06/2020    Lumbar spine pain 01/06/2020    LVH (left ventricular hypertrophy)     seen on echo 8/2020, moderate    Mobitz type I Wenckebach atrioventricular block 04/10/2021    Moderate protein-calorie malnutrition ([address] 03/17/2020    Observed sleep apnea 01/06/2020    OANH (obstructive sleep apnea) 01/06/2020    no C-pap is getting tested    Pneumonia, primary atypical     Reactive depression 01/06/2020    S/P three vessel coronary artery bypass 10/26/2020    Spondylosis without myelopathy or radiculopathy, lumbar region 01/06/2020    Tobacco abuse 01/06/2020    Tobacco use 01/06/2020    Wears dentures     full set     Past Surgical History:   Procedure Laterality Date    ANKLE SURGERY Left 8/2/2021    LEFT FOOT SCREW REMOVAL performed by Lloyd Jose MD at [address]  03/2021    CORONARY ARTERY BYPASS GRAFT N/A 9/25/2020    CORONARY ARTERY BYPASS GRAFTING X3, INTERNAL MAMMARY ARTERY, SAPHENOUS VEIN GRAFT, ON PUMP, STERNAL PLATING, 5 LEVEL BILATERAL INTERCOSTAL NERVE BLOCK, PLATELET GEL APPLICATION performed by Wesley Kam MD at [address] [facility]  03/16/2011    cystoscopy left ureteroscopy, left retrograde, double J stent placement    FINGER AMPUTATION Right 2/2/2021    RIGHT MIDDLE FINGER IRRIGATION AND DEBRIDEMENT WITH REVISION AMPUTATION AND BONE SHORTENING, POSSIBLE NAIL ABLATION performed by Carroll Oro MD at [facility] 12/24/2019    RIGHT LUMBAR FIVE SACRAL ONE TRANSFORAMINAL EPIDURAL STEROID INJECTION SITE CONFIRMED BY FLUOROSCOPY performed by Behzad Mina MD at [address] [facility] 2020    RIGHT LUMBAR FOUR AND LUMBAR FIVE TRANSFORAMINAL EPIDURAL STEROID INJECTION SITE CONFIRMED BY FLUOROSCOPY performed by Melodie Saldana MD at [place] [address]     Family History   Problem Relation Age of Onset    Heart Disease Mother     Other Mother         copd    High Blood Pressure Sister     No Known Problems Sister      Social History     Socioeconomic History    Marital status:      Spouse name: Not on file    Number of children: Not on file    Years of education: Not on file    Highest education level: Not on file   Occupational History    Not on file   Tobacco Use    Smoking status: Former Smoker     Packs/day: 2.00     Years: 35.00     Pack years: 70.00     Types: Cigarettes     Quit date: 3/1/2020     Years since quittin.2    Smokeless tobacco: Never Used   Vaping Use    Vaping Use: Never used   Substance and Sexual Activity    Alcohol use: No    Drug use: Not Currently     Types: Marijuana Margette Coker), Cocaine     Comment: hx of drug use 30 yrs ago    Sexual activity: Not Currently   Other Topics Concern    Not on file   Social History Narrative    Not on file     Social Determinants of Health     Financial Resource Strain: Low Risk     Difficulty of Paying Living Expenses: Not hard at all   Food Insecurity: No Food Insecurity    Worried About Running Out of Food in the Last Year: Never true    Theresa of Food in the Last Year: Never true   Transportation Needs:     Lack of Transportation (Medical): Not on file    Lack of Transportation (Non-Medical):  Not on file   Physical Activity:     Days of Exercise per Week: Not on file    Minutes of Exercise per Session: Not on file   Stress:     Feeling of Stress : Not on file   Social Connections:     Frequency of Communication with Friends and Family: Not on file    Frequency of Social Gatherings with Friends and Family: Not on file    Attends [employer] Services: Not on file   [employer]s or Organizations: Not on file    Attends Club or Organization Meetings: Not on file    Marital Status: Not on file   Intimate Partner Violence:     Fear of Current or Ex-Partner: Not on file    Emotionally Abused: Not on file    Physically Abused: Not on file    Sexually Abused: Not on file   Housing Stability:     Unable to Pay for Housing in the Last Year: Not on file    Number of Carmen in the Last Year: Not on file    Unstable Housing in the Last Year: Not on file     No current facility-administered medications for this encounter. Current Outpatient Medications   Medication Sig Dispense Refill    methocarbamol (ROBAXIN) 500 MG tablet Take 1 tablet by mouth 4 times daily as needed (neck pain/spasm) 20 tablet 0    amoxicillin-clavulanate (AUGMENTIN) 875-125 MG per tablet Take 1 tablet by mouth 2 times daily for 14 days Take with meals.  28 tablet 0    fluticasone-umeclidin-vilant (TRELEGY ELLIPTA) 100-62.5-25 MCG/INH AEPB Inhale 1 puff into the lungs daily 60 each 5    Cholecalciferol (VITAMIN D3) 1.25 MG (20522 UT) CAPS 1 po a week 4 capsule 5    atorvastatin (LIPITOR) 80 MG tablet Take 1 tablet by mouth nightly 90 tablet 3    benzonatate (TESSALON) 200 MG capsule TAKE 1 CAPSULE BY MOUTH THREE TIMES A DAY AS NEEDED FOR COUGH 30 capsule 0    albuterol (PROVENTIL) (2.5 MG/3ML) 0.083% nebulizer solution Take 3 mLs by nebulization every 6 hours as needed for Wheezing or Shortness of Breath 360 each 5    albuterol sulfate HFA (PROVENTIL HFA) 108 (90 Base) MCG/ACT inhaler Inhale 2 puffs into the lungs every 4 hours as needed for Wheezing or Shortness of Breath 18 g 5    lansoprazole (PREVACID) 30 MG delayed release capsule Take 1 capsule by mouth daily 45 capsule 0    DULoxetine (CYMBALTA) 30 MG extended release capsule Take 90 mg by mouth daily      clopidogrel (PLAVIX) 75 MG tablet Take 1 tablet by mouth daily 90 tablet 3    furosemide (LASIX) 40 MG tablet Take 1 tablet by mouth as needed (for weight grater than 300lbs) 45 tablet 3    hydrOXYzine (VISTARIL) 25 MG capsule Take 25 mg by mouth in the morning and at bedtime       isosorbide mononitrate (IMDUR) 30 MG extended release tablet Take 2 tablets by mouth daily 60 tablet 3    Multiple Vitamins-Minerals (THERAPEUTIC MULTIVITAMIN-MINERALS) tablet Take 1 tablet by mouth daily      traZODone (DESYREL) 100 MG tablet Take 1 tablet by mouth 2 times daily Am pm 60 tablet 5    OXYGEN Inhale 2 L into the lungs as needed       nitroGLYCERIN (NITROSTAT) 0.4 MG SL tablet Place 1 tablet under the tongue every 5 minutes as needed for Chest pain 25 tablet 3    aspirin 81 MG EC tablet Take 1 tablet by mouth daily 30 tablet 0     Allergies   Allergen Reactions    Dilaudid [Hydromorphone Hcl] Nausea And Vomiting    Codeine Itching       REVIEW OF SYSTEMS  10 systems reviewed, pertinent positives per HPI otherwise noted to be negative. PHYSICAL EXAM  BP (!) 141/86   Pulse 90   Temp 98 °F (36.7 °C) (Oral)   Resp 18   Ht 5' 4\" (1.626 m)   Wt 285 lb (129.3 kg)   SpO2 94%   BMI 48.92 kg/m²    GENERAL APPEARANCE: Awake and alert. Cooperative. No acute distress. HENT: Normocephalic. Atraumatic. Mucous membranes are moist.  No tonsillar swelling or exudates. No uvular deviation. No difficulty tolerating secretions. Does have tenderness palpation posterior and inferior to the left ear with no obvious deformity. No mastoid tenderness. NECK: Supple. No neck stiffness or meningismus. EYES: PERRL. EOM's grossly intact. HEART/CHEST: RRR. No murmurs. LUNGS: Respirations unlabored. CTAB. Good air exchange. Speaking comfortably in full sentences. ABDOMEN: No tenderness. Soft. Non-distended. No masses. No organomegaly. No guarding or rebound. MUSCULOSKELETAL: No extremity edema. Compartments soft. No deformity. No tenderness in the extremities. All extremities neurovascularly intact. SKIN: Warm and dry. No acute rashes.    NEUROLOGICAL: Alert and oriented. CN's 2-12 intact. No gross facial drooping. Strength 5/5, sensation intact. Gait normal.  PSYCHIATRIC: Normal mood and affect. LABS  I have reviewed all labs for this visit. Results for orders placed or performed during the hospital encounter of 05/13/22   CBC with Auto Differential   Result Value Ref Range    WBC 10.9 4.0 - 11.0 K/uL    RBC 5.42 4.20 - 5.90 M/uL    Hemoglobin 15.6 13.5 - 17.5 g/dL    Hematocrit 46.5 40.5 - 52.5 %    MCV 85.8 80.0 - 100.0 fL    MCH 28.9 26.0 - 34.0 pg    MCHC 33.6 31.0 - 36.0 g/dL    RDW 16.4 (H) 12.4 - 15.4 %    Platelets 792 173 - 113 K/uL    MPV 7.7 5.0 - 10.5 fL    Neutrophils % 74.4 %    Lymphocytes % 15.8 %    Monocytes % 6.6 %    Eosinophils % 2.1 %    Basophils % 1.1 %    Neutrophils Absolute 8.1 (H) 1.7 - 7.7 K/uL    Lymphocytes Absolute 1.7 1.0 - 5.1 K/uL    Monocytes Absolute 0.7 0.0 - 1.3 K/uL    Eosinophils Absolute 0.2 0.0 - 0.6 K/uL    Basophils Absolute 0.1 0.0 - 0.2 K/uL   Comprehensive Metabolic Panel w/ Reflex to MG   Result Value Ref Range    Sodium 139 136 - 145 mmol/L    Potassium reflex Magnesium 4.6 3.5 - 5.1 mmol/L    Chloride 102 99 - 110 mmol/L    CO2 29 21 - 32 mmol/L    Anion Gap 8 3 - 16    Glucose 139 (H) 70 - 99 mg/dL    BUN 17 7 - 20 mg/dL    CREATININE 0.9 0.9 - 1.3 mg/dL    GFR Non-African American >60 >60    GFR African American >60 >60    Calcium 9.1 8.3 - 10.6 mg/dL    Total Protein 6.9 6.4 - 8.2 g/dL    Albumin 3.7 3.4 - 5.0 g/dL    Albumin/Globulin Ratio 1.2 1.1 - 2.2    Total Bilirubin 0.3 0.0 - 1.0 mg/dL    Alkaline Phosphatase 113 40 - 129 U/L    ALT 28 10 - 40 U/L    AST 20 15 - 37 U/L       RADIOLOGY  CT SOFT TISSUE NECK W CONTRAST   Final Result   1. No acute intracranial abnormality. 2. Mild global parenchymal volume loss with chronic microvascular ischemic   changes. 3. No acute abnormality of the soft tissue structures of the neck. CT HEAD WO CONTRAST   Final Result   1. No acute intracranial abnormality. 2. Mild global parenchymal volume loss with chronic microvascular ischemic   changes. 3. No acute abnormality of the soft tissue structures of the neck. ED COURSE/MDM  Patient seen and evaluated. Old records reviewed. Labs and imaging reviewed and results discussed with patient. Patient is a very pleasant 51-year-old male, presenting with several week history of pain in the left side of his neck, started on antibiotics did not get any better. Full HPI as detailed above. Upon arrival in the ED, vitals reassuring. Patient is resting comfortably is in no acute distress. He has tenderness palpation in the left side of his neck, no obvious masses or swelling, no neck stiffness or meningismus. He is afebrile and appears nontoxic. Basic labs were obtained, he has no leukocytosis or other acute concerning electrolyte normalities. CT of the head and soft tissues of the neck were performed which did not reveal any acute intracranial abnormality, and no acute abnormality of the soft tissue structures of the neck. Concern for possible muscle spasm/strain causing patient's symptoms. He has been taking Tylenol at home, will be sent with a prescription for Robaxin as well. Will be given referral to ear nose and throat for further evaluation of his symptoms or not improving. He is comfortable in agreement with plan of care was discharged. Given return precautions for the ED. During the patient's ED course, the patient was given:  Medications   ketorolac (TORADOL) injection 15 mg (15 mg IntraVENous Given 5/13/22 8631)   iopamidol (ISOVUE-370) 76 % injection 75 mL (75 mLs IntraVENous Given 5/13/22 1032)        CLINICAL IMPRESSION  1. Neck pain on left side        Blood pressure (!) 141/86, pulse 90, temperature 98 °F (36.7 °C), temperature source Oral, resp. rate 18, height 5' 4\" (1.626 m), weight 285 lb (129.3 kg), SpO2 94 %.     DISPOSITION  Lynda Mckinney was discharged to home in good condition. Patient was given scripts for the following medications. I counseled patient how to take these medications. New Prescriptions    METHOCARBAMOL (ROBAXIN) 500 MG TABLET    Take 1 tablet by mouth 4 times daily as needed (neck pain/spasm)       Follow-up with:  Brittanie Jackson, ANICETO Whitley CNP  [address] [facility]  [phone]    Schedule an appointment as soon as possible for a visit   As needed    [id number] [facility]  [address]  [phone]  Go to   If symptoms worsen    [address] [place] [address]  [id number] [phone]  Schedule an appointment as soon as possible for a visit   As needed      DISCLAIMER: This chart was created using Dragon dictation software. Efforts were made by me to ensure accuracy, however some errors may be present due to limitations of this technology and occasionally words are not transcribed correctly.        Sophia Stout MD  05/13/22 2187

## 2022-05-16 ENCOUNTER — OFFICE VISIT (OUTPATIENT)
Dept: CARDIOLOGY CLINIC | Age: 62
End: 2022-05-16
Payer: COMMERCIAL

## 2022-05-16 ENCOUNTER — NURSE ONLY (OUTPATIENT)
Dept: CARDIOLOGY CLINIC | Age: 62
End: 2022-05-16
Payer: COMMERCIAL

## 2022-05-16 VITALS
OXYGEN SATURATION: 99 % | WEIGHT: 294 LBS | SYSTOLIC BLOOD PRESSURE: 118 MMHG | HEIGHT: 64 IN | DIASTOLIC BLOOD PRESSURE: 50 MMHG | BODY MASS INDEX: 50.19 KG/M2 | HEART RATE: 77 BPM

## 2022-05-16 DIAGNOSIS — I46.9 ASYSTOLE (HCC): ICD-10-CM

## 2022-05-16 DIAGNOSIS — Z95.0 PACEMAKER: ICD-10-CM

## 2022-05-16 DIAGNOSIS — I44.2 CHB (COMPLETE HEART BLOCK) (HCC): ICD-10-CM

## 2022-05-16 DIAGNOSIS — I49.5 SSS (SICK SINUS SYNDROME) (HCC): ICD-10-CM

## 2022-05-16 DIAGNOSIS — I25.5 ISCHEMIC CARDIOMYOPATHY: ICD-10-CM

## 2022-05-16 DIAGNOSIS — I49.5 SSS (SICK SINUS SYNDROME) (HCC): Primary | ICD-10-CM

## 2022-05-16 DIAGNOSIS — R00.1 SINUS BRADYCARDIA: ICD-10-CM

## 2022-05-16 DIAGNOSIS — I44.1 MOBITZ TYPE I WENCKEBACH ATRIOVENTRICULAR BLOCK: ICD-10-CM

## 2022-05-16 PROCEDURE — G8427 DOCREV CUR MEDS BY ELIG CLIN: HCPCS | Performed by: NURSE PRACTITIONER

## 2022-05-16 PROCEDURE — 99214 OFFICE O/P EST MOD 30 MIN: CPT | Performed by: NURSE PRACTITIONER

## 2022-05-16 PROCEDURE — 1036F TOBACCO NON-USER: CPT | Performed by: NURSE PRACTITIONER

## 2022-05-16 PROCEDURE — 3017F COLORECTAL CA SCREEN DOC REV: CPT | Performed by: NURSE PRACTITIONER

## 2022-05-16 PROCEDURE — G8417 CALC BMI ABV UP PARAM F/U: HCPCS | Performed by: NURSE PRACTITIONER

## 2022-05-16 PROCEDURE — 93280 PM DEVICE PROGR EVAL DUAL: CPT | Performed by: INTERNAL MEDICINE

## 2022-05-16 RX ORDER — METOPROLOL SUCCINATE 25 MG/1
25 TABLET, EXTENDED RELEASE ORAL DAILY
Qty: 30 TABLET | Refills: 3 | Status: SHIPPED | OUTPATIENT
Start: 2022-05-16

## 2022-05-16 NOTE — PROGRESS NOTES
Patient presents to the device clinic today for a programming evaluation for his defibrillator. Patient has a history of CHB, asystole, ICM, Mobitz I, sinus bradycardia, and SSS. Takes Plavix. Last device interrogation was on 4/12. Since then, 3 NSVT events recorded. 4 SVT-ST events recorded - longest x 1 minute at 158 bpm.     P wave: 3.6 mV  R wave: 16.6 mV    AP 3.9%  16.6%    All sensing and pacing parameters are within normal range. No changes need to be made at this time. Patient education was provided about device functionality, in home monitoring, and any other patient questions and/or concerns were addressed. Patient voices understanding. Patient will follow up in 3 months in office or remotely. Please see interrogation for more detail - Paceart report located under the Cardiology tab.

## 2022-05-16 NOTE — PROGRESS NOTES
[facility], [place], operated by [facility]   Electrophysiology Outpatient Note              Date:  May 16, 2022  Patient name: Mara Whaley  YOB: 1960    Primary Care physician: ANICETO Boyd CNP    HISTORY OF PRESENT ILLNESS: The patient is a 63 y.o.  male with a history of SSS, CAD, ICM, CHF, HTN, HLD, OANH, COPD, diverticulitis, obesity, anxiety and chronic respiratory failure. In 9/2020, he was admitted with chest pain. Stress testing was abnormal. He had intermittent 2:1 AV conduction and CHB. LHC showed MVD and he underwent CABG. In 4/2021, he was admitted with chest pain. Stress testing was abnormal. LHC showed 2 out of 3 bypass grafts were occluded. He underwent high risk PCI. While hospitalized, he was noted to have PVC's and intermittent nocturnal CHB and one instance of daytime Mobitz I. He wore a monitor at discharge that showed predominately SR with an average HR of 71 (). PAC burden 0.63%, PVC burden 0.01%, longest pause lasting 3.5 seconds. On 7/23/2021, he had a loop recorded implanted. Device interrogation showed numerous bradycardia/pause events as well as Mobitz I and CHB. On 8/26/2021, he underwent dual chamber pacemaker implant and loop explant. On 9/2/2021, device check showed AP 3.6%,  9.1% and no arrhythmias. In 12/2021, he had a PCI of the D1 with ISREAL x1, rotational arthrectomy of RCA, shockwave coronary lithotripsy of RCA and PCI of RCA with a single ISREAL. He was then referred to the [address] [place] [address] to see Dr. Rick Nugent for structural heart disease. In 3/2022, he underwent PCI to OM with ISREAL x 4and PCI to ostial proximal RCA with ISREAL x1 and PCI of the ramus branch (Dr. Rick Nugent). Today he is being seen for SSS and HTN. He is feeling well. He has no complaints today. Denies chest pain, palpitations, shortness of breath, and dizziness.        Device check today shows:   Brand: [id number] Systems  Mode: AAI>DDD  Normal function   3.9% AP  16.6%     Arrhythmias: NSVT x 3 (longest was 3 sec) and SVT/ST  Battery life 14.1 years  RA impedance 361 ohms   RV impedance 475 ohms   RA threshold 0.375 V @ 0.40 ms  RV threshold 0.750 V @ 0.40 ms  RA sensitivity 0.30 mV  RV sensitivity 0.90 mV      Past Medical History:   has a past medical history of Acute diverticulitis, Acute kidney injury ([address] Acute on chronic respiratory failure with hypoxemia ([address] Acute respiratory failure ([address] Acute respiratory failure with hypoxia (HCC), Anxiety, Aortic valve calcification, CAD in native artery, Chronic obstructive pulmonary disease (HCC), Chronic systolic congestive heart failure (HCC), Class 3 severe obesity with body mass index (BMI) of 45.0 to 49.9 in [facility]), Coronary artery calcification, Coronary artery disease involving native heart with angina pectoris ([address] Crush injury of right foot, DDD (degenerative disc disease), lumbar, Depression, Diverticulitis of colon, Erectile dysfunction, Fatigue, HNP (herniated nucleus pulposus), lumbar, Hyperglycemia, Hyperlipidemia, Hypersomnia, Hypertension, Insomnia, Ischemic cardiomyopathy, Kidney stone, Lumbar radiculopathy, Lumbar spine pain, LVH (left ventricular hypertrophy), Mobitz type I Wenckebach atrioventricular block, Moderate protein-calorie malnutrition ([facility] [address] Observed sleep apnea, OANH (obstructive sleep apnea), Pneumonia, primary atypical, Reactive depression, S/P three vessel coronary artery bypass, Spondylosis without myelopathy or radiculopathy, lumbar region, Tobacco abuse, Tobacco use, and Wears dentures. Past Surgical History:   has a past surgical history that includes Cholecystectomy; Cystoscopy (03/16/2011); Pain management procedure (Right, 12/24/2019); Pain management procedure (Right, 1/7/2020); Coronary artery bypass graft (N/A, 9/25/2020); Finger amputation (Right, 2/2/2021); Coronary angioplasty with stent (03/2021); and Ankle surgery (Left, 8/2/2021).      Home Medications:    Prior to Admission medications Medication Sig Start Date End Date Taking? Authorizing Provider   methocarbamol (ROBAXIN) 500 MG tablet Take 1 tablet by mouth 4 times daily as needed (neck pain/spasm) 5/13/22 5/18/22 Yes Geovanna Lewis MD   amoxicillin-clavulanate (AUGMENTIN) 875-125 MG per tablet Take 1 tablet by mouth 2 times daily for 14 days Take with meals.  5/9/22 5/23/22 Yes ANICETO Platt CNP   fluticasone-umeclidin-vilant (TRELEGY ELLIPTA) 100-62.5-25 MCG/INH AEPB Inhale 1 puff into the lungs daily 4/25/22  Yes Marquis Franco MD   Cholecalciferol (VITAMIN D3) 1.25 MG (17368 UT) CAPS 1 po a week 4/25/22  Yes ANICETO Platt CNP   atorvastatin (LIPITOR) 80 MG tablet Take 1 tablet by mouth nightly 4/13/22  Yes Abiel Cohen MD   albuterol (PROVENTIL) (2.5 MG/3ML) 0.083% nebulizer solution Take 3 mLs by nebulization every 6 hours as needed for Wheezing or Shortness of Breath 4/11/22  Yes Marquis Franco MD   albuterol sulfate HFA (PROVENTIL HFA) 108 (90 Base) MCG/ACT inhaler Inhale 2 puffs into the lungs every 4 hours as needed for Wheezing or Shortness of Breath 4/7/22  Yes Marquis Franco MD   DULoxetine (CYMBALTA) 30 MG extended release capsule Take 90 mg by mouth daily 3/29/22  Yes Historical Provider, MD   clopidogrel (PLAVIX) 75 MG tablet Take 1 tablet by mouth daily 2/18/22  Yes ANICETO Merritt CNP   furosemide (LASIX) 40 MG tablet Take 1 tablet by mouth as needed (for weight grater than 300lbs)  Patient taking differently: Take 40 mg by mouth daily  1/20/22 1/20/23 Yes Phil Soares DO   hydrOXYzine (VISTARIL) 25 MG capsule Take 25 mg by mouth in the morning and at bedtime  1/11/22  Yes Historical Provider, MD   isosorbide mononitrate (IMDUR) 30 MG extended release tablet Take 2 tablets by mouth daily 1/19/22  Yes Birmingham Jakes, APRN - CNP   Multiple Vitamins-Minerals (THERAPEUTIC MULTIVITAMIN-MINERALS) tablet Take 1 tablet by mouth daily   Yes Historical Provider, MD   traZODone (DESYREL) 100 MG tablet Take 1 tablet by mouth 2 times daily Am pm 11/15/21  Yes ANICETO Soriano CNP   OXYGEN Inhale 2 L into the lungs as needed    Yes Historical Provider, MD   nitroGLYCERIN (NITROSTAT) 0.4 MG SL tablet Place 1 tablet under the tongue every 5 minutes as needed for Chest pain 4/29/21  Yes Sánchez Sparks MD   aspirin 81 MG EC tablet Take 1 tablet by mouth daily 9/30/20  Yes ANICETO Garcia CNP   benzonatate (TESSALON) 200 MG capsule TAKE 1 CAPSULE BY MOUTH THREE TIMES A DAY AS NEEDED FOR COUGH  Patient not taking: Reported on 5/16/2022 4/13/22   ANICETO Soriano CNP   lansoprazole (PREVACID) 30 MG delayed release capsule Take 1 capsule by mouth daily 3/30/22 5/14/22  ANICETO Soriano CNP       Allergies:  Dilaudid [hydromorphone hcl] and Codeine    Social History:   reports that he quit smoking about 2 years ago. His smoking use included cigarettes. He has a 70.00 pack-year smoking history. He has never used smokeless tobacco. He reports previous drug use. Drugs: Marijuana (Weed) and Cocaine. He reports that he does not drink alcohol. Family History: family history includes Heart Disease in his mother; High Blood Pressure in his sister; No Known Problems in his sister; Other in his mother. All 14 point review of systems are completed and pertinent positives are mentioned in the history of present illness. Other systems are reviewed and are negative. PHYSICAL EXAM:    Vital signs:    BP (!) 118/50   Pulse 77   Ht 5' 4\" (1.626 m)   Wt 294 lb (133.4 kg)   SpO2 99%   BMI 50.46 kg/m²      Constitutional and general appearance: alert, cooperative, no distress and appears stated age  HEENT: PERRL, no cervical lymphadenopathy. No masses palpable.  Normal oral mucosa  Respiratory:  · Normal excursion and expansion without use of accessory muscles  · Resp auscultation: Normal breath sounds without wheezing, rhonchi, and rales  Cardiovascular:  · The apical impulse is not displaced  · Heart tones are crisp and normal. regular S1 and S2.  · Jugular venous pulsation Normal  · The carotid upstroke is normal in amplitude and contour without delay or bruit  · Peripheral pulses are symmetrical and full   Abdomen:  · No masses or tenderness  · Bowel sounds present  Extremities:  ·  No cyanosis or clubbing  ·  trace lower extremity edema  ·  Skin: warm and dry  Neurological:  · Alert and oriented  · Moves all extremities well  · No abnormalities of mood, affect, memory, mentation, or behavior are noted    DATA:    Echo 3/2021:      Conclusions      Summary   Limited echo for left ventricle systolic function. Definity is used for   myocardial border enhancement. LV systolic function is mildly reduced with a visually estimated EF=45-50%. The left ventricle is normal in size with normal wall thickness. More prominent Inferior HK on certain views. Indeterminate diastolic function. Stress 4/2021:   Summary    Large area, reversible, moderate to severe intensity perfusion defect    involving the basal to apical anterolateral, mid to apical anterior and    apex. Mixed ischemia/scar of the inferolateral territory. Decreased wall    motion and myocardial thickening of the above segments. Post-stress LVEF    visually estimated 45-50%. Overall findings represent a high risk scan. Cath 4/19/2021:     [facility] 4/19/21   Left heart catheterization     LVEDP  5      See diagnostic catheterization report for full details, would add that there is severe tortuosity and eccentric plaque within the left main, LAD and D1 which proximal to mid was 75% and distally was 99%.         CONCLUSIONS:      Successful rotational atherectomy and PCI of D1 with single drug-eluting stent  Successful rotational atherectomy and PCI of left main/LAD with single drug-eluting stent  Medical therapy for LCx , this lesion does not appear to be amenable for PCI      Cath 4/2021:  FINDINGS      LVGRAM     LVEDP  6 GRADIENT ACROSS AORTIC VALVE  none   LV FUNCTION EF 60%   WALL MOTION  normal   MITRAL REGURGITATION  mild         CORONARY ARTERIES     LM Proximal less than 10% stenosis, mid-distal haziness with calcification and 70% eccentric stenosis.       LAD  Proximal-mid diffuse tubular 80% stenosis, there is mid-distal bidirectional flow noted. Distal vessel is visualized on LIMA angiogram, this shows 60% distal stenosis.     D1 is a large vessel with tortuosity noted and ostial/proximal 70% stenosis followed by mid vessel calcification with 50% stenosis and mid-distal 99% stenosis.       LCX Medium to large size vessel, proximal 30 to 40% stenosis.     OM 3 appears to be the largest OM and is 100% proximal-mid , distal vessel is seen through left to left collaterals and appears to have less than 10% stenosis.       RCA Dominant, tortuous, proximal-mid 60 to 70% stenosis.     There are moderate right to left collaterals to the circumflex.            BYPASS GRAFTS     LIMA-LAD Widely patent with less than 10% [id number] stenosis, the distal LAD has stenosis as noted above.         SVG-D1  100% proximal/mid-distal          SVG-circumflex  100% [id number]             Findings and plans as noted below were discussed with the patient and family, they understand/agree        CONCLUSIONS:      2 out of 3 bypass grafts are occluded, the LIMA to LAD graft is patent  We will consult with CT surgery regarding options for revascularization which potentially may include redo CABG versus high risk PCI of the left main into diagonal  Will order CTA abd/bilat LE w/ runoff, in case cardiac support devices are needed      All labs and testing reviewed. CARDIOLOGY LABS:   CBC:   No results for input(s): WBC, HGB, HCT, PLT in the last 72 hours. BMP:   No results for input(s): NA, K, CO2, BUN, CREATININE, LABGLOM, GLUCOSE in the last 72 hours.   PT/INR: No results for input(s): PROTIME, INR in the last 72 hours.  APTT:No results for input(s): APTT in the last 72 hours. FASTING LIPID PANEL:  Lab Results   Component Value Date    HDL 34 11/11/2021    LDLCALC 23 11/11/2021    TRIG 126 11/11/2021     LIVER PROFILE:  No results for input(s): AST, ALT, ALB in the last 72 hours.     IMPRESSION:      Assessment:   Sick sinus syndrome: stable              - Mobitz I and CHB noted on ILR interrogation 8/4/2021              -s/p dual chamber pacemaker implant on 8/26/2021 ([employer])               -device per HPI  S/P loop recorder implant 7/23/2021 with explant 8/26/2021  Chronic combined CHF: compensated  Ischemic cardiomyopathy with recovered EF              -EF 55% 12/2021, EF 45-50% 3/2021, previously 55-60% 9/2020  CAD:              -s/p CABG x 3 9/2020              -s/p LHC that showed patent LIMA-LAD and occluded vein grafts 4/14/2021              -s/p rotational arthrectomy and PCI with ISREAL to D1 and LM/LAD 4/19/2021   -s/p PCI to OM with ISREAL x 4and PCI to ostial proximal RCA with ISREAL x1 and PCI of the ramus branch (Dr. Layla Raines) 4/2022  HTN: controlled   HLD  OANH: CPAP compliant  COPD: wears O2 at home PRN   Diverticulitis   History of tobacco abuse   Obesity: diet, exercise, and weight loss is recommended  Chronic respiratory failure  Anxiety   Depression      Plan:   Resume Toprol 25 mg PO daily given CAD and ICM   Remote device transmissions every three months   Follow up with Dr. Rafiq Bose as scheduled   Follow up in 6 months      [facility] jovanna Mendez, APRN-CNP  [address]  [phone]

## 2022-05-18 ENCOUNTER — HOSPITAL ENCOUNTER (OUTPATIENT)
Dept: CARDIAC REHAB | Age: 62
Setting detail: THERAPIES SERIES
Discharge: HOME | End: 2022-05-18
Payer: COMMERCIAL

## 2022-05-18 ENCOUNTER — OFFICE VISIT (OUTPATIENT)
Dept: ENT CLINIC | Age: 62
End: 2022-05-18
Payer: COMMERCIAL

## 2022-05-18 VITALS
SYSTOLIC BLOOD PRESSURE: 113 MMHG | TEMPERATURE: 97.8 F | DIASTOLIC BLOOD PRESSURE: 67 MMHG | WEIGHT: 293 LBS | HEIGHT: 64 IN | BODY MASS INDEX: 50.02 KG/M2 | HEART RATE: 76 BPM

## 2022-05-18 DIAGNOSIS — M54.2 NECK PAIN: Primary | ICD-10-CM

## 2022-05-18 DIAGNOSIS — I50.31 ACUTE DIASTOLIC CONGESTIVE HEART FAILURE (HCC): ICD-10-CM

## 2022-05-18 DIAGNOSIS — R05.3 CHRONIC COUGH: ICD-10-CM

## 2022-05-18 PROCEDURE — 99203 OFFICE O/P NEW LOW 30 MIN: CPT | Performed by: OTOLARYNGOLOGY

## 2022-05-18 PROCEDURE — 1036F TOBACCO NON-USER: CPT | Performed by: OTOLARYNGOLOGY

## 2022-05-18 PROCEDURE — 93798 PHYS/QHP OP CAR RHAB W/ECG: CPT

## 2022-05-18 PROCEDURE — 3017F COLORECTAL CA SCREEN DOC REV: CPT | Performed by: OTOLARYNGOLOGY

## 2022-05-18 PROCEDURE — G8417 CALC BMI ABV UP PARAM F/U: HCPCS | Performed by: OTOLARYNGOLOGY

## 2022-05-18 PROCEDURE — G8427 DOCREV CUR MEDS BY ELIG CLIN: HCPCS | Performed by: OTOLARYNGOLOGY

## 2022-05-18 ASSESSMENT — ENCOUNTER SYMPTOMS
SHORTNESS OF BREATH: 0
SINUS PRESSURE: 0
APNEA: 0
FACIAL SWELLING: 0
EYE ITCHING: 0
VOICE CHANGE: 0
TROUBLE SWALLOWING: 0
COUGH: 0
SORE THROAT: 0

## 2022-05-18 NOTE — PROGRESS NOTES
[address]  [place], [place]  P: [phone]       Patient     Almond Siemens  1960    ChiefComplaint     Chief Complaint   Patient presents with    Other     Patient is here for know on his left side behind and under ear. Patient has had a CT and it stated the carotid arteries are clogged. Patient states tht the spot is sore and if he yawns it hurts very bad. History of Present Illness     Nga Scott is a 62-year-old male here today for evaluation of left posterior neck pain and lump. Symptoms started 1/2-month ago after cardiac stent placement. Reports waking up the morning of throughout the procedure with left posterior neck pain and a lump. Pain has persisted. States it radiates from base of skull superiorly and anteriorly. If he yawns he has significant pain as well. Seen in ED and had CT of the neck which was normal.  Given Robaxin with minimal improvement. Currently undergoing cardiac rehab.   Straight smoking, quit 2 years ago    Past Medical History     Past Medical History:   Diagnosis Date    Acute diverticulitis 04/10/2021    Acute kidney injury ([address] 04/08/2021    Acute on chronic respiratory failure with hypoxemia (HCC)     Acute respiratory failure ([address] 08/19/2020    Acute respiratory failure with hypoxia ([address]     Anxiety 01/06/2020    Aortic valve calcification 09/2020    seen on lung CT    CAD in native artery 04/14/2021    Chronic obstructive pulmonary disease ([address] 09/03/2019    PFT done 9/03/19    Chronic systolic congestive heart failure ([address] 04/10/2021    Class 3 severe obesity with body mass index (BMI) of 45.0 to 49.9 in adult [facility])     Coronary artery calcification 09/2020    seen on lung ct    Coronary artery disease involving native heart with angina pectoris ([address] 09/22/2020    Crush injury of right foot 07/23/2015    DDD (degenerative disc disease), lumbar 01/06/2020    Depression 01/06/2020    Diverticulitis of colon 04/10/2021    Erectile dysfunction 01/06/2020    Fatigue 01/06/2020    HNP (herniated nucleus pulposus), lumbar 01/06/2020    Hyperglycemia 01/06/2020    Hyperlipidemia     Hypersomnia 01/06/2020    Hypertension     Insomnia     Ischemic cardiomyopathy     Kidney stone     Lumbar radiculopathy 01/06/2020    Lumbar spine pain 01/06/2020    LVH (left ventricular hypertrophy)     seen on echo 8/2020, moderate    Mobitz type I Wenckebach atrioventricular block 04/10/2021    Moderate protein-calorie malnutrition ([address] 03/17/2020    Observed sleep apnea 01/06/2020    OANH (obstructive sleep apnea) 01/06/2020    no C-pap is getting tested    Pneumonia, primary atypical     Reactive depression 01/06/2020    S/P three vessel coronary artery bypass 10/26/2020    Spondylosis without myelopathy or radiculopathy, lumbar region 01/06/2020    Tobacco abuse 01/06/2020    Tobacco use 01/06/2020    Wears dentures     full set       Past Surgical History     Past Surgical History:   Procedure Laterality Date    ANKLE SURGERY Left 8/2/2021    LEFT FOOT SCREW REMOVAL performed by Mark Cabrera MD at [address]  03/2021    CORONARY ARTERY BYPASS GRAFT N/A 9/25/2020    CORONARY ARTERY BYPASS GRAFTING X3, INTERNAL MAMMARY ARTERY, SAPHENOUS VEIN GRAFT, ON PUMP, STERNAL PLATING, 5 LEVEL BILATERAL INTERCOSTAL NERVE BLOCK, PLATELET GEL APPLICATION performed by Jeannette Hodge MD at [address] [facility]  03/16/2011    cystoscopy left ureteroscopy, left retrograde, double J stent placement    FINGER AMPUTATION Right 2/2/2021    RIGHT MIDDLE FINGER IRRIGATION AND DEBRIDEMENT WITH REVISION AMPUTATION AND BONE SHORTENING, POSSIBLE NAIL ABLATION performed by Christine Rosado MD at [address] [facility] Right 12/24/2019    RIGHT LUMBAR FIVE SACRAL ONE TRANSFORAMINAL EPIDURAL STEROID INJECTION SITE CONFIRMED BY FLUOROSCOPY performed by Suly Li MD at [address] Right 2020    RIGHT LUMBAR FOUR AND LUMBAR FIVE TRANSFORAMINAL EPIDURAL STEROID INJECTION SITE CONFIRMED BY FL[facility]OROSCOPY [facility]       Family History     Family History   Problem Relation Age of Onset    Heart Disease Mother     Other Mother         copd    High Blood Pressure Sister     No Known Problems Sister        Social History     Social History     Tobacco Use    Smoking status: Former Smoker     Packs/day: 2.00     Years: 35.00     Pack years: 70.00     Types: Cigarettes     Quit date: 3/1/2020     Years since quittin.2    Smokeless tobacco: Never Used   Vaping Use    Vaping Use: Never used   Substance Use Topics    Alcohol use: No    Drug use: Not Currently     Types: Marijuana Sally Bartelso), Cocaine     Comment: hx of drug use 30 yrs ago        Allergies     Allergies   Allergen Reactions    Dilaudid [Hydromorphone Hcl] Nausea And Vomiting    Codeine Itching       Medications     Current Outpatient Medications   Medication Sig Dispense Refill    metoprolol succinate (TOPROL XL) 25 MG extended release tablet Take 1 tablet by mouth daily 30 tablet 3    methocarbamol (ROBAXIN) 500 MG tablet Take 1 tablet by mouth 4 times daily as needed (neck pain/spasm) 20 tablet 0    amoxicillin-clavulanate (AUGMENTIN) 875-125 MG per tablet Take 1 tablet by mouth 2 times daily for 14 days Take with meals.  28 tablet 0    fluticasone-umeclidin-vilant (TRELEGY ELLIPTA) 100-62.5-25 MCG/INH AEPB Inhale 1 puff into the lungs daily 60 each 5    Cholecalciferol (VITAMIN D3) 1.25 MG (92108 UT) CAPS 1 po a week 4 capsule 5    atorvastatin (LIPITOR) 80 MG tablet Take 1 tablet by mouth nightly 90 tablet 3    benzonatate (TESSALON) 200 MG capsule TAKE 1 CAPSULE BY MOUTH THREE TIMES A DAY AS NEEDED FOR COUGH 30 capsule 0    albuterol (PROVENTIL) (2.5 MG/3ML) 0.083% nebulizer solution Take 3 mLs by nebulization every 6 hours as needed for Wheezing or Shortness of Breath 360 each 5    albuterol sulfate HFA (PROVENTIL HFA) 108 (90 Base) MCG/ACT inhaler Inhale 2 puffs into the lungs every 4 hours as needed for Wheezing or Shortness of Breath 18 g 5    DULoxetine (CYMBALTA) 30 MG extended release capsule Take 90 mg by mouth daily      clopidogrel (PLAVIX) 75 MG tablet Take 1 tablet by mouth daily 90 tablet 3    furosemide (LASIX) 40 MG tablet Take 1 tablet by mouth as needed (for weight grater than 300lbs) (Patient taking differently: Take 40 mg by mouth daily ) 45 tablet 3    hydrOXYzine (VISTARIL) 25 MG capsule Take 25 mg by mouth in the morning and at bedtime       isosorbide mononitrate (IMDUR) 30 MG extended release tablet Take 2 tablets by mouth daily 60 tablet 3    Multiple Vitamins-Minerals (THERAPEUTIC MULTIVITAMIN-MINERALS) tablet Take 1 tablet by mouth daily      traZODone (DESYREL) 100 MG tablet Take 1 tablet by mouth 2 times daily Am pm 60 tablet 5    OXYGEN Inhale 2 L into the lungs as needed       nitroGLYCERIN (NITROSTAT) 0.4 MG SL tablet Place 1 tablet under the tongue every 5 minutes as needed for Chest pain 25 tablet 3    aspirin 81 MG EC tablet Take 1 tablet by mouth daily 30 tablet 0    lansoprazole (PREVACID) 30 MG delayed release capsule Take 1 capsule by mouth daily 45 capsule 0     No current facility-administered medications for this visit. Review of Systems     Review of Systems   Constitutional: Negative for appetite change, chills, fatigue, fever and unexpected weight change. HENT: Negative for congestion, ear discharge, ear pain, facial swelling, hearing loss, nosebleeds, postnasal drip, sinus pressure, sneezing, sore throat, tinnitus, trouble swallowing and voice change. Eyes: Negative for itching. Respiratory: Negative for apnea, cough and shortness of breath. Endocrine: Negative for cold intolerance and heat intolerance. Musculoskeletal: Positive for neck pain. Negative for myalgias. Skin: Negative for rash. Allergic/Immunologic: Negative for environmental allergies. Neurological: Negative for dizziness and headaches. Psychiatric/Behavioral: Negative for confusion, decreased concentration and sleep disturbance. PhysicalExam     Vitals:    05/18/22 1056   BP: 113/67   Site: Left Upper Arm   Position: Sitting   Pulse: 76   Temp: 97.8 °F (36.6 °C)   TempSrc: Infrared   Weight: 293 lb (132.9 kg)   Height: 5' 4\" (1.626 m)       Physical Exam  Constitutional:       General: He is not in acute distress. Appearance: He is well-developed. HENT:      Head: Normocephalic and atraumatic. Right Ear: Tympanic membrane, ear canal and external ear normal. No drainage. No middle ear effusion. Tympanic membrane is not bulging. Tympanic membrane has normal mobility. Left Ear: Tympanic membrane, ear canal and external ear normal. No drainage. No middle ear effusion. Tympanic membrane is not bulging. Tympanic membrane has normal mobility. Nose: No mucosal edema or rhinorrhea. Mouth/Throat:      Lips: Pink. Mouth: Mucous membranes are moist.      Tongue: No lesions. Palate: No mass. Pharynx: Uvula midline. Eyes:      Pupils: Pupils are equal, round, and reactive to light. Neck:      Thyroid: No thyroid mass or thyromegaly. Trachea: Trachea and phonation normal.     Cardiovascular:      Pulses: Normal pulses. Pulmonary:      Effort: Pulmonary effort is normal. No accessory muscle usage or respiratory distress. Breath sounds: No stridor. Musculoskeletal:      Cervical back: Full passive range of motion without pain. Lymphadenopathy:      Head:      Right side of head: No submental or submandibular adenopathy. Left side of head: No submental or submandibular adenopathy. Cervical: No cervical adenopathy.       Right cervical: No superficial, deep or posterior cervical adenopathy. Left cervical: No superficial, deep or posterior cervical adenopathy. Skin:     General: Skin is warm and dry. Neurological:      Mental Status: He is alert and oriented to person, place, and time. Cranial Nerves: No cranial nerve deficit. Coordination: Coordination normal.      Gait: Gait normal.   Psychiatric:         Thought Content: Thought content normal.             Assessment and Plan     1. Neck pain  -CT normal  -Palpable area of muscle tension/spasm left superior trapezius/posterior muscular cervical chain  -Recommend warm compress, stretching and physical therapy  - [address]    Follow-up with PCP. There is no mass within the neck. Ferny Malagon, DO  5/18/22      Portions of this note were dictated using Dragon.  There may be linguistic errors secondary to the use of this program.

## 2022-05-18 NOTE — PROGRESS NOTES
CARDIOLOGY FOLLOW-UP VISIT        Patient Name: Katey Garcia  Primary Care physician: ANICETO Szymanski CNP  Reason for Referral/Chief complaint: CAD s/p PCI     Subjective:     Katey Garcia is a pleasant 63 y.o. man with prior history notable for obstructive sleep apnea noncompliant with CPAP previously, Mobitz I with 2:1, congestive heart failure with ischemic cardiomyopathy, and complex coronary artery disease. I first evaluated the patient at [facility] 9/25/20 at which time he was treated for unstable angina. Transferred to [facility] for catheterization which revealed multi-vessel coronary artery disease. He underwent three-vessel bypass with Dr. Mehdi Melo, receiving a LIMA to LAD, SVG to OM and SVG to diagonal. Limited ECHO 9/29/20 confirmed Normal left ventricular systolic function with ejection fraction of 55-60%. Patient's course complicated with vein graft failure. He underwent LHC on 4/14/21 which noted patent LIMA-LAD and occluded vein grafts. On 4/19/21, he underwent rotational atherectomy of the left main, LAD and first diagonal with PCI/ISREAL to D1 as well as LM/LAD. Loop/Device check 8/4/2021 showed numerous bradycardia and pause recording. EGMs show Mobitz I/CHB. Patient underwent medtronic dual PPM implant 8/26/21 by Dr. Jin López for sinus bradycardia. In the [facility] left heart cath with Dr. Rosaura Thompson on 12/22/2021 Successful PCI of D1 with single drug-eluting stent. Successful rotational atherectomy of RCA Successful shockwave coronary lithotripsy of RCA. Successful PCI of RCA with single drug-eluting stent. Consider staged PCI of LIMA to LAD. Consider referral to [facility] Dr Layla Raines for consideration of LCx  PCI. Admitted 3/23-3/25/22 CAD with unstable angina pectoris and subsequently underwent  PCI to OM with DESx4 and PCI to ostial proximal RCA with DESx1 and PCI Ramus branch ISR per Dr. Pam Montez and Dr. Paulino Bobby.   At discharge was instructed to stop metoprolol succinate 25 mg.     5/16/2022 EP with Kayla mcfadden for SSS and HTN. Resumed Toprol 25 mg. Today, states he has been doing fairly well since his most recent PCI with Dr. Brayan Dale at [facility].  He has no angina. He still has fatigue. Not any worse since start of beta-blocker he states. He does have shortness of breath occasionally that does not limit his activity and rehab. He is riding his motorcycle and derives great enjoyment from this. No palpitations or dizziness. No issues with his pacemaker. Denies paroxysmal nocturnal dyspnea, orthopnea, bendopnea, increasing lower extremity edema or weight gain. Urinates well with his current dose of Lasix. 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 The patient is compliant with medications. Cost of medications is affordable. No endorsed side effects. Home Medications:  Were reviewed and are listed in nursing record and/or below  Prior to Admission medications    Medication Sig Start Date End Date Taking?  Authorizing Provider   lansoprazole (PREVACID) 30 MG delayed release capsule Take 1 capsule by mouth daily 5/19/22 7/3/22 Yes ANICETO Dickens CNP   benzonatate (TESSALON) 200 MG capsule TAKE 1 CAPSULE BY MOUTH THREE TIMES A DAY AS NEEDED FOR COUGH 5/19/22  Yes ANICETO Dickens CNP   furosemide (LASIX) 40 MG tablet Take 1 tablet by mouth as needed (for weight grater than 300lbs) 5/19/22 5/19/23 Yes Phil Soares,    metoprolol succinate (TOPROL XL) 25 MG extended release tablet Take 1 tablet by mouth daily 5/16/22  Yes ANICETO Dickson CNP   fluticasone-umeclidin-vilant (TRELEGY ELLIPTA) 100-62.5-25 MCG/INH AEPB Inhale 1 puff into the lungs daily 4/25/22  Yes Khang Omer MD   Cholecalciferol (VITAMIN D3) 1.25 MG (99131 UT) CAPS 1 po a week 4/25/22  Yes ANICETO Dickens CNP atorvastatin (LIPITOR) 80 MG tablet Take 1 tablet by mouth nightly 4/13/22  Yes Patrick Gardner MD   albuterol (PROVENTIL) (2.5 MG/3ML) 0.083% nebulizer solution Take 3 mLs by nebulization every 6 hours as needed for Wheezing or Shortness of Breath 4/11/22  Yes Khang Omer MD   albuterol sulfate HFA (PROVENTIL HFA) 108 (90 Base) MCG/ACT inhaler Inhale 2 puffs into the lungs every 4 hours as needed for Wheezing or Shortness of Breath 4/7/22  Yes Khang Omer MD   DULoxetine (CYMBALTA) 30 MG extended release capsule Take 90 mg by mouth daily 3/29/22  Yes Historical Provider, MD   clopidogrel (PLAVIX) 75 MG tablet Take 1 tablet by mouth daily 2/18/22  Yes ANICETO Young CNP   hydrOXYzine (VISTARIL) 25 MG capsule Take 25 mg by mouth in the morning and at bedtime  1/11/22  Yes Historical Provider, MD   isosorbide mononitrate (IMDUR) 30 MG extended release tablet Take 2 tablets by mouth daily 1/19/22  Yes ANICETO Young CNP   Multiple Vitamins-Minerals (THERAPEUTIC MULTIVITAMIN-MINERALS) tablet Take 1 tablet by mouth daily   Yes Historical Provider, MD   traZODone (DESYREL) 100 MG tablet Take 1 tablet by mouth 2 times daily Am pm 11/15/21  Yes ANICETO Dickens CNP   nitroGLYCERIN (NITROSTAT) 0.4 MG SL tablet Place 1 tablet under the tongue every 5 minutes as needed for Chest pain 4/29/21  Yes Patrick Gardner MD   aspirin 81 MG EC tablet Take 1 tablet by mouth daily 9/30/20  Yes Leopoldo Campanile, APRN - CNP   OXYGEN Inhale 2 L into the lungs as needed   Patient not taking: Reported on 5/26/2022    Historical Provider, MD   Reports he is not taking digoxin, potassium, furosemide or statin presently. He is taking Plavix in addition aspirin. CURRENT Medications:  No current facility-administered medications for this visit. Allergies:  Dilaudid [hydromorphone hcl] and Codeine     Review of Systems:   A 14 point review of symptoms completed.  Pertinent positives identified in the HPI, all other review of symptoms negative. Objective:     Vitals:    05/26/22 1253   BP: 110/78   Pulse: 63   SpO2: 94%   Weight: 290 lb (131.5 kg)   Height: 5' 4\" (1.626 m)        Wt Readings from Last 3 Encounters:   05/26/22 290 lb (131.5 kg)   05/18/22 293 lb (132.9 kg)   05/16/22 294 lb (133.4 kg)       PHYSICAL EXAM:    General:   No acute distress   Head:  Normocephalic, atraumatic   Eyes:  Conjunctiva/corneas clear, anicteric sclerae    Nose: Nares normal, no drainage or sinus tenderness   Throat: No abnormalities of the lips, oral mucosa or tongue. Moist mucous membranes. Neck: Trachea midline. Neck supple with no lymphadenopathy, thyroid not enlarged, symmetric, no tenderness/mass/nodules   Lungs:   Clear to auscultation bilaterally, no wheezes, no rales, no respiratory distress   Chest Wall:  Well-healed sternotomy incision. Well healed PPM site. Heart:  Regular rate and rhythm, normal S1, normal S2, no murmur, no rub, no S3/S4, PMI non-palpable. Abdomen:   Obese, soft, non-tender, with normoactive bowel sounds. No masses, no hepatosplenomegaly   Extremities: No cyanosis, clubbing. No edema. Vascular:  1+ right radial, 2+ left radial, dorsalis pedis and posterior tibial pulses bilaterally. Brisk carotid upstrokes without carotid bruit. Skin: Skin color, texture, turgor are normal with no rashes or ulceration. Pysch: Euthymic mood, appropriate affect   Neurologic: Oriented to person, place and time. No slurred speech or facial asymmetry. No motor or sensory deficits on gross examination.          Labs:   CBC:   Lab Results   Component Value Date    WBC 10.9 05/13/2022    RBC 5.42 05/13/2022    HGB 15.6 05/13/2022    HCT 46.5 05/13/2022    MCV 85.8 05/13/2022    RDW 16.4 05/13/2022     05/13/2022     CMP:  Lab Results   Component Value Date     05/13/2022    K 4.6 05/13/2022     05/13/2022    CO2 29 05/13/2022    BUN 17 05/13/2022    CREATININE 0.9 05/13/2022    GFRAA >60 05/13/2022 GFRAA >60 03/16/2011    AGRATIO 1.2 05/13/2022    LABGLOM >60 05/13/2022    GLUCOSE 139 05/13/2022    PROT 6.9 05/13/2022    PROT 6.9 03/16/2011    CALCIUM 9.1 05/13/2022    BILITOT 0.3 05/13/2022    ALKPHOS 113 05/13/2022    AST 20 05/13/2022    ALT 28 05/13/2022     PT/INR:  No results found for: PTINR  HgBA1c:  Lab Results   Component Value Date    LABA1C 6.3 11/11/2021     Lab Results   Component Value Date    TROPONINI <0.01 09/05/2021     Fasting lipid profile from 9/25/2020 reviewed. HDL 34, LDL 16, triglycerides 173, total cholesterol 85.     Lab Results   Component Value Date    CHOL 82 11/11/2021    CHOL 85 09/25/2020    CHOL 142 09/19/2020     Lab Results   Component Value Date    TRIG 126 11/11/2021    TRIG 173 (H) 09/25/2020    TRIG 217 (H) 09/19/2020     Lab Results   Component Value Date    HDL 34 (L) 11/11/2021    HDL 35 (L) 01/13/2021    HDL 34 (L) 09/25/2020     Lab Results   Component Value Date    LDLCALC 23 11/11/2021    LDLCALC 33 01/13/2021    LDLCALC 16 09/25/2020     Lab Results   Component Value Date    LABVLDL 25 11/11/2021    LABVLDL 26 01/13/2021    LABVLDL 35 09/25/2020     No results found for: CHOLHDLRATIO    Interval Testing/Data:     Left Heart Cath: 03/23/2022 [facility]    · PCI of the ostial proximal RCA de Lydia stenosis with a 4.0 x 20 mm   synergy drug-eluting stent post dilated 4.5 mm   · PCI of the ramus branch InStent restenosis related to stent overlap and   under expansion with a 3.0 mm noncompliant high-pressure balloon inflation   ·  PCI of the obtuse marginal branch using retrograde epicardial WIRE   ESCALATION with stenting to the left main, 3.5 x 20, 3.0 x 32, 2.25 x 38,   2.25 x 12 mm, post dilated up to 4.5 mm proximally       Left heart cath: 12/22/2021  Successful rotational atherectomy and PCI of D1 with single drug-eluting stent  Successful rotational atherectomy and PCI of left main/LAD with single drug-eluting stent  Medical therapy for LCx , this lesion does not appear to be amenable for PCI      ECHO 21:   Summary   Technically difficult examination secondary to habitus. LV systolic function is normal with EF estimated at 55%. Endocardium not entirely well visualized but no obvious segmental wall   motion abnormalities. There is mild concentric left ventricular hypertrophy. Normal diastolic function with normal LV filling pressure. Mild mitral annular calcification. Aortic valve appears sclerotic but opens adequately. Individual aortic valve leaflets are not clearly visualized. Cannot r/o   bicuspid valve. ECHO 3/12/21  Summary   Limited echo for left ventricle systolic function. Definity is used for   myocardial border enhancement. LV systolic function is mildly reduced with a visually estimated EF=45-50%. The left ventricle is normal in size with normal wall thickness. More prominent Inferior HK on certain views. Indeterminate diastolic function. CAB2020  Urgent coronary artery bypass grafting surgery x3 with a single greater saphenous vein graft to the obtuse marginal branch of the circumflex, separate single greater saphenous vein graft to the first diagonal branch of the LAD, pedicled left internal artery to the LAD. [place] catheter placement. Cardiopulmonary bypass. Endoscopic vein harvesting of the right greater saphenous vein. Transesophageal echo. Epiaortic ultrasound. Doppler verification of grafts. Bilateral five-level intercostal nerve block with Exparel. Platelet gel application      Stress test 21:   Summary  Abnormal moderate risk myocardial perfusion study. There is a large sized lateral defect consistent with ischemia. The left ventricular size and function are normal.  The estimated left ventricular function is 60%.   Stress Protocols   Resting ECG  Normal sinus rhythm; left axis; cannot  r/o prior inferior and anterolateral  infarct; low voltage; nonspecific ST  change      [facility] 4/19/21   Left heart catheterization     LVEDP  5      See diagnostic catheterization report for full details, would add that there is severe tortuosity and eccentric plaque within the left main, LAD and D1 which proximal to mid was 75% and distally was 99%. CONCLUSIONS:      Successful rotational atherectomy and PCI of D1 with single drug-eluting stent  Successful rotational atherectomy and PCI of left main/LAD with single drug-eluting stent  Medical therapy for LCx , this lesion does not appear to be amenable for PCI      [facility] 4/14/2021  LVGRAM     LVEDP  6   GRADIENT ACROSS AORTIC VALVE  none   LV FUNCTION EF 60%   WALL MOTION  normal   MITRAL REGURGITATION  mild         CORONARY ARTERIES     LM Proximal less than 10% stenosis, mid-distal haziness with calcification and 70% eccentric stenosis. LAD  Proximal-mid diffuse tubular 80% stenosis, there is mid-distal bidirectional flow noted. Distal vessel is visualized on LIMA angiogram, this shows 60% distal stenosis. D1 is a large vessel with tortuosity noted and ostial/proximal 70% stenosis followed by mid vessel calcification with 50% stenosis and mid-distal 99% stenosis. LCX Medium to large size vessel, proximal 30 to 40% stenosis. OM 3 appears to be the largest OM and is 100% proximal-mid , distal vessel is seen through left to left collaterals and appears to have less than 10% stenosis. RCA Dominant, tortuous, proximal-mid 60 to 70% stenosis. There are moderate right to left collaterals to the circumflex. BYPASS GRAFTS     LIMA-LAD Widely patent with less than 10% [id number]-zuv-engfub stenosis, the distal LAD has stenosis as noted above.          SVG-D1  100% proximal/mid-distal          SVG-circumflex  100% [id number]             Findings and plans as noted below were discussed with the patient and family, they understand/agree        CONCLUSIONS:      2 out of 3 bypass grafts are occluded, the LIMA to LAD graft is patent  We will consult with CT surgery regarding options for revascularization which potentially may include redo CABG versus high risk PCI of the left main into diagonal  Will order CTA abd/bilat LE w/ runoff, in case cardiac support devices are needed        Cardiac monitor: 05/12/21        Impression and Plan     1. Coronary artery disease manifest with unstable angina status post bypass 9/2020 with occluded SVG's status post multiple subsequent PCI/ revascularization of diagonal, ramus, right coronary territories. 2.  Chronic congestive heart failure with recovered EF compensated by exam today. Wt stable. 3.  History of heart block status post dual-chamber PPM   4. Obstructive sleep apnea compliant with CPAP  5. Tobacco abuse, now quit. 6.  Obesity with BMI 51  7. Chronic obstructive pulmonary disease, moderate by PFTs  8.   Carotid artery disease, mild    Patient Active Problem List   Diagnosis    Crush injury of right foot    Shortness of breath    Erectile dysfunction    Hyperglycemia    Anxiety    Depression    History of alcohol abuse    Fatigue    OANH (obstructive sleep apnea)    Hypersomnia    Chronic obstructive pulmonary disease (HCC)    Lumbar radiculopathy    HNP (herniated nucleus pulposus), lumbar    Spondylosis without myelopathy or radiculopathy, lumbar region    DDD (degenerative disc disease), lumbar    Lumbar spine pain    Acute respiratory distress    Class 3 severe obesity with body mass index (BMI) of 45.0 to 49.9 in adult ([address]    Pneumonia, primary atypical    Acute respiratory failure with hypoxia ([address]    Moderate protein-calorie malnutrition (HCC)    Acute respiratory failure (HCC)    Acute on chronic respiratory failure with hypoxemia (HCC)    Acute diastolic congestive heart failure (HCC)    Hyperlipidemia    Insomnia    Morbid obesity with BMI of 45.0-49.9, adult (HCC)    Abnormal cardiovascular stress test    Coronary artery disease involving native heart    S/P three vessel coronary artery bypass    Acute kidney injury ([facility]    Chronic combined systolic and diastolic congestive heart failure (HCC)    Mobitz type I Wenckebach atrioventricular block    Asystole (HCC)    Ischemic cardiomyopathy    Diverticulitis of colon    Coronary artery disease involving native coronary artery of native heart with unstable angina pectoris (HCC)    CHB (complete heart block) (HCC)    Sinus bradycardia    Cellulitis    Acute deep vein thrombosis (DVT) of left upper extremity (HCC)    Cellulitis of chest wall    Painful orthopaedic hardware (HCC)    SSS (sick sinus syndrome) (HCC)    Observed sleep apnea    ASHD (arteriosclerotic heart disease)    Pacemaker    Chronic total occlusion of coronary artery       PLAN:  1. Continue all current medications including dual endplate therapy, statin, beta-blocker, Imdur for complex coronary artery disease. 2. Continue cardiac rehab  3. Check fasting lipids next able. 4.  Nightly CPAP therapy      Follow up with 6 months    This note is scribed in the presence of Marshal Perez by Zully Acosta RN    The scribes documentation has been prepared under my direction and personally reviewed by me in its entirety. I confirm that the note above accurately reflects all work, treatment, procedures, and medical decision making performed by me. Kin Monteiro MD, personally performed the services described in this documentation as scribed by Zully Acosta RN in my presence, and it is both accurate and complete to the best of our ability. I will address the patient's cardiac risk factors and adjusted pharmacologic treatment as needed. In addition, I have reinforced the need for patient directed risk factor modification. All questions and concerns were addressed to the patient/family. Alternatives to my treatment were discussed.      Thank you for allowing us to participate in the care of Ivette Hopkins. Please call me with any questions [phone].     Madalyn Mckoy MD, [facility]   Cardiovascular Disease  [facility] [address]  [phone] [facility]  [phone] [address] [place] [address]  5/26/2022 1:07 PM

## 2022-05-19 RX ORDER — FUROSEMIDE 40 MG/1
40 TABLET ORAL PRN
Qty: 90 TABLET | Refills: 3 | Status: SHIPPED | OUTPATIENT
Start: 2022-05-19 | End: 2023-05-19

## 2022-05-19 RX ORDER — LANSOPRAZOLE 30 MG/1
30 CAPSULE, DELAYED RELEASE ORAL DAILY
Qty: 30 CAPSULE | Refills: 1 | Status: SHIPPED | OUTPATIENT
Start: 2022-05-19 | End: 2022-08-03

## 2022-05-19 RX ORDER — BENZONATATE 200 MG/1
CAPSULE ORAL
Qty: 30 CAPSULE | Refills: 1 | Status: SHIPPED | OUTPATIENT
Start: 2022-05-19 | End: 2022-08-03

## 2022-05-20 ENCOUNTER — HOSPITAL ENCOUNTER (OUTPATIENT)
Dept: CARDIAC REHAB | Age: 62
Setting detail: THERAPIES SERIES
Discharge: HOME | End: 2022-05-20
Payer: COMMERCIAL

## 2022-05-20 PROCEDURE — 93798 PHYS/QHP OP CAR RHAB W/ECG: CPT

## 2022-05-23 ENCOUNTER — HOSPITAL ENCOUNTER (OUTPATIENT)
Dept: CARDIAC REHAB | Age: 62
Setting detail: THERAPIES SERIES
Discharge: HOME | End: 2022-05-23
Payer: COMMERCIAL

## 2022-05-23 PROCEDURE — 93798 PHYS/QHP OP CAR RHAB W/ECG: CPT

## 2022-05-25 ENCOUNTER — HOSPITAL ENCOUNTER (OUTPATIENT)
Dept: CARDIAC REHAB | Age: 62
Setting detail: THERAPIES SERIES
Discharge: HOME | End: 2022-05-25
Payer: COMMERCIAL

## 2022-05-25 PROCEDURE — 93798 PHYS/QHP OP CAR RHAB W/ECG: CPT

## 2022-05-26 ENCOUNTER — OFFICE VISIT (OUTPATIENT)
Dept: CARDIOLOGY CLINIC | Age: 62
End: 2022-05-26
Payer: COMMERCIAL

## 2022-05-26 VITALS
DIASTOLIC BLOOD PRESSURE: 78 MMHG | WEIGHT: 290 LBS | BODY MASS INDEX: 49.51 KG/M2 | SYSTOLIC BLOOD PRESSURE: 110 MMHG | HEART RATE: 63 BPM | OXYGEN SATURATION: 94 % | HEIGHT: 64 IN

## 2022-05-26 DIAGNOSIS — I25.10 CORONARY ARTERY DISEASE INVOLVING NATIVE CORONARY ARTERY OF NATIVE HEART WITHOUT ANGINA PECTORIS: ICD-10-CM

## 2022-05-26 DIAGNOSIS — I46.9 ASYSTOLE (HCC): ICD-10-CM

## 2022-05-26 DIAGNOSIS — R94.39 ABNORMAL CARDIOVASCULAR STRESS TEST: ICD-10-CM

## 2022-05-26 DIAGNOSIS — Z95.0 PACEMAKER: ICD-10-CM

## 2022-05-26 DIAGNOSIS — I44.2 CHB (COMPLETE HEART BLOCK) (HCC): ICD-10-CM

## 2022-05-26 DIAGNOSIS — I44.1 MOBITZ TYPE I WENCKEBACH ATRIOVENTRICULAR BLOCK: ICD-10-CM

## 2022-05-26 DIAGNOSIS — I25.110 CORONARY ARTERY DISEASE INVOLVING NATIVE CORONARY ARTERY OF NATIVE HEART WITH UNSTABLE ANGINA PECTORIS (HCC): Primary | ICD-10-CM

## 2022-05-26 DIAGNOSIS — I50.42 CHRONIC COMBINED SYSTOLIC AND DIASTOLIC CONGESTIVE HEART FAILURE (HCC): ICD-10-CM

## 2022-05-26 DIAGNOSIS — I25.5 ISCHEMIC CARDIOMYOPATHY: ICD-10-CM

## 2022-05-26 DIAGNOSIS — I50.31 ACUTE DIASTOLIC CONGESTIVE HEART FAILURE (HCC): ICD-10-CM

## 2022-05-26 DIAGNOSIS — R00.1 SINUS BRADYCARDIA: ICD-10-CM

## 2022-05-26 PROCEDURE — 1036F TOBACCO NON-USER: CPT | Performed by: INTERNAL MEDICINE

## 2022-05-26 PROCEDURE — 3017F COLORECTAL CA SCREEN DOC REV: CPT | Performed by: INTERNAL MEDICINE

## 2022-05-26 PROCEDURE — G8427 DOCREV CUR MEDS BY ELIG CLIN: HCPCS | Performed by: INTERNAL MEDICINE

## 2022-05-26 PROCEDURE — G8417 CALC BMI ABV UP PARAM F/U: HCPCS | Performed by: INTERNAL MEDICINE

## 2022-05-26 PROCEDURE — 99214 OFFICE O/P EST MOD 30 MIN: CPT | Performed by: INTERNAL MEDICINE

## 2022-05-26 NOTE — PATIENT INSTRUCTIONS
PLAN:  1. Labwork fasting lipids. 2. Continue Rehab  3. Wear sleep apnea machine  4.  Continue current medications      Follow up with 6 months

## 2022-05-26 NOTE — LETTER
[facility] CARDIOLOGY  [address] [place] [address] [address] [address]  Phone: [phone]  Fax: [phone]    Maribel Mauricio MD    May 26, 2022     ANICETO Munoz - CNP  [address]    Patient: Kezia Nuñez   MR Number: [id number]   YOB: 1960   Date of Visit: 5/26/2022       Dear Tammy Pond: Thank you for referring Kezia Nuñez to me for evaluation/treatment. Below are the relevant portions of my assessment and plan of care. If you have questions, please do not hesitate to call me. I look forward to following Geovanni Baez along with you.     Sincerely,      Maribel Mauricio MD

## 2022-05-27 ENCOUNTER — HOSPITAL ENCOUNTER (OUTPATIENT)
Dept: CARDIAC REHAB | Age: 62
Setting detail: THERAPIES SERIES
Discharge: HOME | End: 2022-05-27
Payer: COMMERCIAL

## 2022-05-27 ENCOUNTER — HOSPITAL ENCOUNTER (OUTPATIENT)
Age: 62
Discharge: HOME | End: 2022-05-27
Payer: COMMERCIAL

## 2022-05-27 DIAGNOSIS — I25.110 CORONARY ARTERY DISEASE INVOLVING NATIVE CORONARY ARTERY OF NATIVE HEART WITH UNSTABLE ANGINA PECTORIS (HCC): ICD-10-CM

## 2022-05-27 LAB
CHOLESTEROL, TOTAL: 95 MG/DL (ref 0–199)
HDLC SERPL-MCNC: 28 MG/DL (ref 40–60)
LDL CHOLESTEROL CALCULATED: 27 MG/DL
TRIGL SERPL-MCNC: 199 MG/DL (ref 0–150)
VLDLC SERPL CALC-MCNC: 40 MG/DL

## 2022-05-27 PROCEDURE — 93798 PHYS/QHP OP CAR RHAB W/ECG: CPT

## 2022-05-27 PROCEDURE — 80061 LIPID PANEL: CPT

## 2022-05-27 PROCEDURE — 36415 COLL VENOUS BLD VENIPUNCTURE: CPT

## 2022-05-30 ENCOUNTER — APPOINTMENT (OUTPATIENT)
Dept: CARDIAC REHAB | Age: 62
End: 2022-05-30
Payer: COMMERCIAL

## 2022-05-31 RX ORDER — CLOPIDOGREL BISULFATE 75 MG/1
75 TABLET ORAL DAILY
Qty: 90 TABLET | Refills: 3 | Status: SHIPPED | OUTPATIENT
Start: 2022-05-31

## 2022-06-01 ENCOUNTER — TELEPHONE (OUTPATIENT)
Dept: CARDIOLOGY CLINIC | Age: 62
End: 2022-06-01

## 2022-06-01 DIAGNOSIS — I25.110 CORONARY ARTERY DISEASE INVOLVING NATIVE CORONARY ARTERY OF NATIVE HEART WITH UNSTABLE ANGINA PECTORIS (HCC): ICD-10-CM

## 2022-06-01 DIAGNOSIS — E78.5 HYPERLIPIDEMIA, UNSPECIFIED HYPERLIPIDEMIA TYPE: Primary | ICD-10-CM

## 2022-06-01 NOTE — TELEPHONE ENCOUNTER
Attempted to reach pt to see what pharmacy he would like vascepa sent to, and to make sure he has patient assistance number listed below.  Left vm will attempt to reach at a later time

## 2022-06-01 NOTE — TELEPHONE ENCOUNTER
----- Message from Jasvir Livingston MD sent at 5/27/2022 10:40 PM EDT -----  Please let Crystal Claros know I reviewed his labs. LDL or bad cholesterol is well below goal.  Triglycerides remain elevated above goal 150, nearly 200. Recommend referral for Juan Jose as he has complex coronary artery disease in his medication will help treat triglycerides and prevent recurrent heart attack in this patient.

## 2022-06-01 NOTE — TELEPHONE ENCOUNTER
Dr. Alfa Mathias patient who is OOT. Forwarding to Dr. Lopez Solares. Could you clarify what Strength of the Vascepa? And will need rx.      Patient access network foundation for patient to call if assistance is needed is [phone]

## 2022-06-02 RX ORDER — ICOSAPENT ETHYL 1000 MG/1
2 CAPSULE ORAL 2 TIMES DAILY
Qty: 60 CAPSULE | Refills: 5 | Status: SHIPPED
Start: 2022-06-02 | End: 2022-06-24

## 2022-06-02 NOTE — TELEPHONE ENCOUNTER
Pt called back, relayed message and pt wants script sent to the [facility] in [place]. [place]. Pt assistance # was also given.

## 2022-06-03 ENCOUNTER — HOSPITAL ENCOUNTER (OUTPATIENT)
Dept: CARDIAC REHAB | Age: 62
Setting detail: THERAPIES SERIES
Discharge: HOME | End: 2022-06-03
Payer: COMMERCIAL

## 2022-06-03 PROCEDURE — 93798 PHYS/QHP OP CAR RHAB W/ECG: CPT

## 2022-06-03 NOTE — TELEPHONE ENCOUNTER
Attempted to reach pt to let him know PA has been filled out and placed in RJM folder to sign.  Please let pt know it can take a few days, to a few weeks for medication to be approved/denied

## 2022-06-08 ENCOUNTER — HOSPITAL ENCOUNTER (OUTPATIENT)
Dept: CARDIAC REHAB | Age: 62
Setting detail: THERAPIES SERIES
Discharge: HOME | End: 2022-06-08
Payer: COMMERCIAL

## 2022-06-08 PROCEDURE — 93798 PHYS/QHP OP CAR RHAB W/ECG: CPT

## 2022-06-10 ENCOUNTER — HOSPITAL ENCOUNTER (OUTPATIENT)
Dept: CARDIAC REHAB | Age: 62
Setting detail: THERAPIES SERIES
Discharge: HOME | End: 2022-06-10
Payer: COMMERCIAL

## 2022-06-10 PROCEDURE — 93798 PHYS/QHP OP CAR RHAB W/ECG: CPT

## 2022-06-13 ENCOUNTER — HOSPITAL ENCOUNTER (OUTPATIENT)
Dept: CARDIAC REHAB | Age: 62
Setting detail: THERAPIES SERIES
Discharge: HOME | End: 2022-06-13
Payer: COMMERCIAL

## 2022-06-13 PROCEDURE — 93798 PHYS/QHP OP CAR RHAB W/ECG: CPT

## 2022-06-15 ENCOUNTER — HOSPITAL ENCOUNTER (OUTPATIENT)
Dept: CARDIAC REHAB | Age: 62
Setting detail: THERAPIES SERIES
Discharge: HOME | End: 2022-06-15
Payer: COMMERCIAL

## 2022-06-15 ENCOUNTER — TELEPHONE (OUTPATIENT)
Dept: FAMILY MEDICINE CLINIC | Age: 62
End: 2022-06-15

## 2022-06-15 PROCEDURE — 93798 PHYS/QHP OP CAR RHAB W/ECG: CPT

## 2022-06-15 NOTE — TELEPHONE ENCOUNTER
Pt was wanting to see if he could get a referral to see someone for his back. States that he is in a lot of pain is wanting to get it figured out what is causing it.

## 2022-06-15 NOTE — TELEPHONE ENCOUNTER
[id number] Becky Boone for referral to ortho spine specialists of choice   Please place referral and then notify patient with the information

## 2022-06-15 NOTE — TELEPHONE ENCOUNTER
Please place referral for Ortho/spine specialist Dr. Stephanie Oconnell and provide patient with the information

## 2022-06-16 DIAGNOSIS — M54.50 LOW BACK PAIN, UNSPECIFIED BACK PAIN LATERALITY, UNSPECIFIED CHRONICITY, UNSPECIFIED WHETHER SCIATICA PRESENT: Primary | ICD-10-CM

## 2022-06-20 ENCOUNTER — HOSPITAL ENCOUNTER (OUTPATIENT)
Dept: CARDIAC REHAB | Age: 62
Setting detail: THERAPIES SERIES
Discharge: HOME | End: 2022-06-20
Payer: COMMERCIAL

## 2022-06-20 PROCEDURE — 93798 PHYS/QHP OP CAR RHAB W/ECG: CPT

## 2022-06-22 ENCOUNTER — HOSPITAL ENCOUNTER (OUTPATIENT)
Dept: CARDIAC REHAB | Age: 62
Setting detail: THERAPIES SERIES
Discharge: HOME | End: 2022-06-22
Payer: COMMERCIAL

## 2022-06-22 PROCEDURE — 93798 PHYS/QHP OP CAR RHAB W/ECG: CPT

## 2022-06-24 ENCOUNTER — TELEPHONE (OUTPATIENT)
Dept: CARDIOLOGY | Age: 62
End: 2022-06-24

## 2022-06-24 DIAGNOSIS — E78.5 HYPERLIPIDEMIA, UNSPECIFIED HYPERLIPIDEMIA TYPE: Primary | ICD-10-CM

## 2022-06-24 DIAGNOSIS — Z79.899 MEDICATION MANAGEMENT: ICD-10-CM

## 2022-06-24 RX ORDER — CHLORAL HYDRATE 500 MG
2000 CAPSULE ORAL 2 TIMES DAILY
Qty: 90 CAPSULE | Refills: 3 | Status: SHIPPED | OUTPATIENT
Start: 2022-06-24

## 2022-06-24 RX ORDER — FENOFIBRATE 160 MG/1
TABLET ORAL
Qty: 90 TABLET | Refills: 1 | Status: CANCELLED | OUTPATIENT
Start: 2022-06-24

## 2022-06-24 NOTE — TELEPHONE ENCOUNTER
Pt returned my call. Labs ordered. Just to verify, pt is to take 1/2 tab of the 160 mg? If so, Medication pended for sign off.

## 2022-06-24 NOTE — PROGRESS NOTES
Monitor company [employer]  Length of monitor 14 days  Monitor ordered by Taylor Alarcon MD  Code: [id number] Monitor ID: [id number]      Nurse will activate at the time of discharge. Yong Cui

## 2022-06-24 NOTE — TELEPHONE ENCOUNTER
LDL < 30 already. Change of plan - cancel fibrate. Only needs TG's addressed. Start Generic fish oil 2 g BID. Sent to [facility]. [place].  BAO CASTRO

## 2022-06-24 NOTE — TELEPHONE ENCOUNTER
Please let Chidisarahi Jordana know our request for Vascepa was denied by insurance. Must try fibrate/niacin first.    Lets start fenofibrate 80 mg PO q day. Send to pharmacy of choice   Check FLP and LFT's in 6 weeks. I will DC Vascepa from last    Please educate Mirela Jordana that this medication can interact with his statin to cause worsening myalgias/muscle aches and pains. Please be aware of this and monitor and let us know accordingly if having trouble tolerating this medication at home.     GLORIA

## 2022-06-27 ENCOUNTER — HOSPITAL ENCOUNTER (OUTPATIENT)
Dept: CARDIAC REHAB | Age: 62
Setting detail: THERAPIES SERIES
Discharge: HOME | End: 2022-06-27
Payer: COMMERCIAL

## 2022-06-27 PROCEDURE — 93798 PHYS/QHP OP CAR RHAB W/ECG: CPT

## 2022-06-29 ENCOUNTER — HOSPITAL ENCOUNTER (OUTPATIENT)
Dept: CARDIAC REHAB | Age: 62
Setting detail: THERAPIES SERIES
Discharge: HOME | End: 2022-06-29
Payer: COMMERCIAL

## 2022-06-29 ENCOUNTER — OFFICE VISIT (OUTPATIENT)
Dept: FAMILY MEDICINE CLINIC | Age: 62
End: 2022-06-29
Payer: COMMERCIAL

## 2022-06-29 VITALS
HEART RATE: 82 BPM | DIASTOLIC BLOOD PRESSURE: 70 MMHG | BODY MASS INDEX: 49.51 KG/M2 | WEIGHT: 290 LBS | OXYGEN SATURATION: 97 % | SYSTOLIC BLOOD PRESSURE: 109 MMHG | HEIGHT: 64 IN

## 2022-06-29 DIAGNOSIS — M79.675 PAIN AROUND TOENAIL, LEFT FOOT: ICD-10-CM

## 2022-06-29 DIAGNOSIS — M54.2 CERVICAL SPINE PAIN: Primary | ICD-10-CM

## 2022-06-29 DIAGNOSIS — R59.1 LYMPHADENOPATHY OF HEAD AND NECK: ICD-10-CM

## 2022-06-29 DIAGNOSIS — M54.12 CERVICAL RADICULAR PAIN: ICD-10-CM

## 2022-06-29 PROCEDURE — G8417 CALC BMI ABV UP PARAM F/U: HCPCS | Performed by: NURSE PRACTITIONER

## 2022-06-29 PROCEDURE — 93798 PHYS/QHP OP CAR RHAB W/ECG: CPT

## 2022-06-29 PROCEDURE — 1036F TOBACCO NON-USER: CPT | Performed by: NURSE PRACTITIONER

## 2022-06-29 PROCEDURE — 99214 OFFICE O/P EST MOD 30 MIN: CPT | Performed by: NURSE PRACTITIONER

## 2022-06-29 PROCEDURE — 3017F COLORECTAL CA SCREEN DOC REV: CPT | Performed by: NURSE PRACTITIONER

## 2022-06-29 PROCEDURE — G8427 DOCREV CUR MEDS BY ELIG CLIN: HCPCS | Performed by: NURSE PRACTITIONER

## 2022-06-29 SDOH — HEALTH STABILITY: PHYSICAL HEALTH: ON AVERAGE, HOW MANY MINUTES DO YOU ENGAGE IN EXERCISE AT THIS LEVEL?: 30 MIN

## 2022-06-29 SDOH — HEALTH STABILITY: PHYSICAL HEALTH: ON AVERAGE, HOW MANY DAYS PER WEEK DO YOU ENGAGE IN MODERATE TO STRENUOUS EXERCISE (LIKE A BRISK WALK)?: 3 DAYS

## 2022-06-29 ASSESSMENT — PATIENT HEALTH QUESTIONNAIRE - PHQ9
SUM OF ALL RESPONSES TO PHQ9 QUESTIONS 1 & 2: 3
8. MOVING OR SPEAKING SO SLOWLY THAT OTHER PEOPLE COULD HAVE NOTICED. OR THE OPPOSITE, BEING SO FIGETY OR RESTLESS THAT YOU HAVE BEEN MOVING AROUND A LOT MORE THAN USUAL: 3
6. FEELING BAD ABOUT YOURSELF - OR THAT YOU ARE A FAILURE OR HAVE LET YOURSELF OR YOUR FAMILY DOWN: 0
SUM OF ALL RESPONSES TO PHQ QUESTIONS 1-9: 17
5. POOR APPETITE OR OVEREATING: 3
3. TROUBLE FALLING OR STAYING ASLEEP: 2
4. FEELING TIRED OR HAVING LITTLE ENERGY: 3
7. TROUBLE CONCENTRATING ON THINGS, SUCH AS READING THE NEWSPAPER OR WATCHING TELEVISION: 3
SUM OF ALL RESPONSES TO PHQ QUESTIONS 1-9: 17
2. FEELING DOWN, DEPRESSED OR HOPELESS: 1
1. LITTLE INTEREST OR PLEASURE IN DOING THINGS: 2
SUM OF ALL RESPONSES TO PHQ QUESTIONS 1-9: 17
10. IF YOU CHECKED OFF ANY PROBLEMS, HOW DIFFICULT HAVE THESE PROBLEMS MADE IT FOR YOU TO DO YOUR WORK, TAKE CARE OF THINGS AT HOME, OR GET ALONG WITH OTHER PEOPLE: 2
SUM OF ALL RESPONSES TO PHQ QUESTIONS 1-9: 17
9. THOUGHTS THAT YOU WOULD BE BETTER OFF DEAD, OR OF HURTING YOURSELF: 0

## 2022-06-29 ASSESSMENT — SOCIAL DETERMINANTS OF HEALTH (SDOH)
WITHIN THE LAST YEAR, HAVE TO BEEN RAPED OR FORCED TO HAVE ANY KIND OF SEXUAL ACTIVITY BY YOUR PARTNER OR EX-PARTNER?: NO
WITHIN THE LAST YEAR, HAVE YOU BEEN HUMILIATED OR EMOTIONALLY ABUSED IN OTHER WAYS BY YOUR PARTNER OR EX-PARTNER?: NO
WITHIN THE LAST YEAR, HAVE YOU BEEN KICKED, HIT, SLAPPED, OR OTHERWISE PHYSICALLY HURT BY YOUR PARTNER OR EX-PARTNER?: NO
WITHIN THE LAST YEAR, HAVE YOU BEEN AFRAID OF YOUR PARTNER OR EX-PARTNER?: NO

## 2022-06-29 NOTE — PROGRESS NOTES
[address] [facility]  [address] [facility] [address]  Dept: [phone]  Dept Fax: [phone]  Loc: [phone]    Alfreda Wood is a 63 y.o. male who presents today for his medical conditions/complaints as noted below. Alfreda Wood is c/o of Other (Knots on Left side of neck and back of head. Having increased jaw pain and craps in neck) and Other (Ingrown infected big toe on left foot)       Subjective:     Chief Complaint   Patient presents with    Other     Knots on Left side of neck and back of head. Having increased jaw pain and craps in neck    Other     Ingrown infected big toe on left foot       HPI  The patient has a fungal infection of his left great toenail. He states that it has been there for multiple years. He states he continues to get ingrown toenails and causes a lot of pain and discomfort. Patient has tried antifungal medication including oral Lamisil tablets without success. At this time he is requesting a podiatry referral to have the toenail removed    The patient also comes in to discuss the continued pain on the left side of his neck. On original examination he did have a line of tenderness over the salivary glands and inside the left side of his mouth and therefore was started on antibiotic therapy however he did not have any type of improvement. He states he has pain and swelling behind his left ear, left posterior cervical region and it does radiate into the left side of his neck and jaw. He denies any actual chest pain. He states that he has some lumps on the back of his neck. He states that these areas are increasing in size. He states that overall everything is getting worse and is causing a lot of discomfort and causing him to have difficulty sleeping.   The patient did go to the emergency room on 5/13/2022 and had a CT soft tissue neck as well as a head CT without contrast  Both tests were essentially normal with no lymphadenopathy noted however there was some degenerative changes of the cervical and thoracic spine noted  At the time there was concern for possible muscle spasm or strain that was causing the patient's symptoms. He was started on Robaxin and told he could continue with Tylenol. The patient had no improvement whatsoever. The patient then was evaluated on 5/18/2022 by ENT who noted that the palpable area was muscle tension/spasm over the left superior trapeze and posterior muscular cervical chain  ENT recommended physical therapy and warm compresses.   Patient states he never did do physical therapy because he was already going through cardiac rehab        Past Medical History:   Diagnosis Date    Acute diverticulitis 04/10/2021    Acute kidney injury ([address] 04/08/2021    Acute on chronic respiratory failure with hypoxemia (HCC)     Acute respiratory failure ([address] 08/19/2020    Acute respiratory failure with hypoxia ([address]     Anxiety 01/06/2020    Aortic valve calcification 09/2020    seen on lung CT    CAD in native artery 04/14/2021    Chronic obstructive pulmonary disease ([address] 09/03/2019    PFT done 9/03/19    Chronic systolic congestive heart failure ([address] 04/10/2021    Class 3 severe obesity with body mass index (BMI) of 45.0 to 49.9 in adult [facility])     Coronary artery calcification 09/2020    seen on lung ct    Coronary artery disease involving native heart with angina pectoris ([address] 09/22/2020    Crush injury of right foot 07/23/2015    DDD (degenerative disc disease), lumbar 01/06/2020    Depression 01/06/2020    Diverticulitis of colon 04/10/2021    Erectile dysfunction 01/06/2020    Fatigue 01/06/2020    HNP (herniated nucleus pulposus), lumbar 01/06/2020    Hyperglycemia 01/06/2020    Hyperlipidemia     Hypersomnia 01/06/2020    Hypertension     Insomnia     Ischemic cardiomyopathy     Kidney stone     Lumbar radiculopathy 01/06/2020    Lumbar spine pain 01/06/2020    LVH (left ventricular hypertrophy)     seen on echo 8/2020, moderate    Mobitz type I Wenckebach atrioventricular block 04/10/2021    Moderate protein-calorie malnutrition ([facility] 03/17/2020    Observed sleep apnea 01/06/2020    OANH (obstructive sleep apnea) 01/06/2020    no C-pap is getting tested    Pneumonia, primary atypical     Reactive depression 01/06/2020    S/P three vessel coronary artery bypass 10/26/2020    Spondylosis without myelopathy or radiculopathy, lumbar region 01/06/2020    Tobacco abuse 01/06/2020    Tobacco use 01/06/2020    Wears dentures     full set         Review of Systems   Constitutional: Positive for fatigue. Negative for appetite change, chills, fever and unexpected weight change. HENT: Negative for congestion, dental problem, drooling, ear discharge, ear pain, facial swelling, mouth sores, postnasal drip, rhinorrhea, sinus pressure, sinus pain, sore throat and trouble swallowing. Eyes: Negative. Respiratory: Negative. Negative for cough and shortness of breath. Cardiovascular: Negative. Negative for chest pain, palpitations and leg swelling. Gastrointestinal: Negative. Negative for abdominal pain, anal bleeding, blood in stool and nausea. Endocrine: Negative. Genitourinary: Negative. Negative for hematuria. Musculoskeletal: Positive for back pain, neck pain and neck stiffness. Skin: Negative. Negative for rash. Allergic/Immunologic: Negative. Neurological: Negative for dizziness, syncope, light-headedness, numbness and headaches. Hematological: Positive for adenopathy. Does not bruise/bleed easily. Psychiatric/Behavioral: Negative. All other systems reviewed and are negative.        Past Medical History:   Diagnosis Date    Acute diverticulitis 04/10/2021    Acute kidney injury ([facility] 04/08/2021    Acute on chronic respiratory failure with hypoxemia (HCC)     Acute respiratory failure ([facility] 08/19/2020    Acute respiratory failure with hypoxia ([facility]     Anxiety 01/06/2020    Aortic valve calcification 09/2020    seen on lung CT    CAD in native artery 04/14/2021    Chronic obstructive pulmonary disease ([address] 09/03/2019    PFT done 9/03/19    Chronic systolic congestive heart failure ([address] 04/10/2021    Class 3 severe obesity with body mass index (BMI) of 45.0 to 49.9 in adult [facility])     Coronary artery calcification 09/2020    seen on lung ct    Coronary artery disease involving native heart with angina pectoris ([address] 09/22/2020    Crush injury of right foot 07/23/2015    DDD (degenerative disc disease), lumbar 01/06/2020    Depression 01/06/2020    Diverticulitis of colon 04/10/2021    Erectile dysfunction 01/06/2020    Fatigue 01/06/2020    HNP (herniated nucleus pulposus), lumbar 01/06/2020    Hyperglycemia 01/06/2020    Hyperlipidemia     Hypersomnia 01/06/2020    Hypertension     Insomnia     Ischemic cardiomyopathy     Kidney stone     Lumbar radiculopathy 01/06/2020    Lumbar spine pain 01/06/2020    LVH (left ventricular hypertrophy)     seen on echo 8/2020, moderate    Mobitz type I Wenckebach atrioventricular block 04/10/2021    Moderate protein-calorie malnutrition ([facility] [facility] 03/17/2020    Observed sleep apnea 01/06/2020    OANH (obstructive sleep apnea) 01/06/2020    no C-pap is getting tested    Pneumonia, primary atypical     Reactive depression 01/06/2020    S/P three vessel coronary artery bypass 10/26/2020    Spondylosis without myelopathy or radiculopathy, lumbar region 01/06/2020    Tobacco abuse 01/06/2020    Tobacco use 01/06/2020    Wears dentures     full set     Family History   Problem Relation Age of Onset    Heart Disease Mother     Other Mother         copd    High Blood Pressure Sister     No Known Problems Sister      Past Surgical History:   Procedure Laterality Date    ANKLE SURGERY Left 8/2/2021    LEFT FOOT SCREW REMOVAL performed by Lou Salmon MD at [address] [place] [address] CHOLECYSTECTOMY      CORONARY ANGIOPLASTY WITH STENT PLACEMENT  2021    CORONARY ARTERY BYPASS GRAFT N/A 2020    CORONARY ARTERY BYPASS GRAFTING X3, INTERNAL MAMMARY ARTERY, SAPHENOUS VEIN GRAFT, ON PUMP, STERNAL PLATING, 5 LEVEL BILATERAL INTERCOSTAL NERVE BLOCK, PLATELET GEL APPLICATION performed by Izzy Oshea MD at [address]  2011    cystoscopy left ureteroscopy, left retrograde, double J stent placement    FINGER AMPUTATION Right 2021    RIGHT MIDDLE FINGER IRRIGATION AND DEBRIDEMENT WITH REVISION AMPUTATION AND BONE SHORTENING, POSSIBLE NAIL ABLATION performed by Jennifer Larson MD at [address] Right 2019    RIGHT LUMBAR FIVE SACRAL ONE TRANSFORAMINAL EPIDURAL STEROID INJECTION SITE CONFIRMED BY FLUOROSCOPY performed by Kamari Mitchell MD at [address] [facility] Right 2020    RIGHT LUMBAR FOUR AND LUMBAR FIVE TRANSFORAMINAL EPIDURAL STEROID INJECTION SITE CONFIRMED BY FLUOROSCOPY performed by Kamari Mitchell MD at [address] [address] [address] History     Socioeconomic History    Marital status:      Spouse name: Not on file    Number of children: Not on file    Years of education: Not on file    Highest education level: Not on file   Occupational History    Not on file   Tobacco Use    Smoking status: Former Smoker     Packs/day: 2.00     Years: 35.00     Pack years: 70.00     Types: Cigarettes     Quit date: 3/1/2020     Years since quittin.3    Smokeless tobacco: Never Used   Vaping Use    Vaping Use: Never used   Substance and Sexual Activity    Alcohol use: No    Drug use: Not Currently     Types: Marijuana Susan Dasen), Cocaine     Comment: hx of drug use 30 yrs ago    Sexual activity: Not Currently   Other Topics Concern    Not on file   Social History Narrative    Not on file     Social Determinants of Health     Financial Resource Strain: Low Risk     Difficulty of Paying Living Expenses: Not hard at all   Food Insecurity: No Food Insecurity    Worried About [address] [place] [address] in the Last Year: Never true    Ran Out of Food in the Last Year: Never true   Transportation Needs:     Lack of Transportation (Medical): Not on file    Lack of Transportation (Non-Medical):  Not on file   Physical Activity:     Days of Exercise per Week: Not on file    Minutes of Exercise per Session: Not on file   Stress:     Feeling of Stress : Not on file   Social Connections:     Frequency of Communication with Friends and Family: Not on file    Frequency of Social Gatherings with Friends and Family: Not on file    Attends Christianity Services: Not on file    Active Member of [address] [place] [address] or Organizations: Not on file    Attends Club or Organization Meetings: Not on file    Marital Status: Not on file   Intimate Partner Violence:     Fear of Current or Ex-Partner: Not on file    Emotionally Abused: Not on file    Physically Abused: Not on file    Sexually Abused: Not on file   Housing Stability:     Unable to Pay for Housing in the Last Year: Not on file    Number of [place] in the Last Year: Not on file    Unstable Housing in the Last Year: Not on file     Current Outpatient Medications   Medication Sig Dispense Refill    Omega-3 Fatty Acids (FISH OIL) 1000 MG CAPS Take 2 capsules by mouth 2 times daily 90 capsule 3    clopidogrel (PLAVIX) 75 MG tablet Take 1 tablet by mouth daily 90 tablet 3    lansoprazole (PREVACID) 30 MG delayed release capsule Take 1 capsule by mouth daily 30 capsule 1    benzonatate (TESSALON) 200 MG capsule TAKE 1 CAPSULE BY MOUTH THREE TIMES A DAY AS NEEDED FOR COUGH 30 capsule 1    furosemide (LASIX) 40 MG tablet Take 1 tablet by mouth as needed (for weight grater than 300lbs) 90 tablet 3    metoprolol succinate (TOPROL XL) 25 MG extended release tablet Take 1 tablet by mouth daily 30 tablet 3    fluticasone-umeclidin-vilant (Jodi Bunde) 100-62.5-25 MCG/INH AEPB Inhale 1 puff into the lungs daily 60 each 5    Cholecalciferol (VITAMIN D3) 1.25 MG (28948 UT) CAPS 1 po a week 4 capsule 5    atorvastatin (LIPITOR) 80 MG tablet Take 1 tablet by mouth nightly 90 tablet 3    albuterol (PROVENTIL) (2.5 MG/3ML) 0.083% nebulizer solution Take 3 mLs by nebulization every 6 hours as needed for Wheezing or Shortness of Breath 360 each 5    albuterol sulfate HFA (PROVENTIL HFA) 108 (90 Base) MCG/ACT inhaler Inhale 2 puffs into the lungs every 4 hours as needed for Wheezing or Shortness of Breath 18 g 5    DULoxetine (CYMBALTA) 30 MG extended release capsule Take 90 mg by mouth daily      hydrOXYzine (VISTARIL) 25 MG capsule Take 25 mg by mouth in the morning and at bedtime       isosorbide mononitrate (IMDUR) 30 MG extended release tablet Take 2 tablets by mouth daily 60 tablet 3    Multiple Vitamins-Minerals (THERAPEUTIC MULTIVITAMIN-MINERALS) tablet Take 1 tablet by mouth daily      traZODone (DESYREL) 100 MG tablet Take 1 tablet by mouth 2 times daily Am pm 60 tablet 5    OXYGEN Inhale 2 L into the lungs as needed       nitroGLYCERIN (NITROSTAT) 0.4 MG SL tablet Place 1 tablet under the tongue every 5 minutes as needed for Chest pain 25 tablet 3    aspirin 81 MG EC tablet Take 1 tablet by mouth daily 30 tablet 0     No current facility-administered medications for this visit. No changes in past medical history, past surgical history, social history, orfamily history were noted during the patient encounter unless specifically listed above. All updates of past medical history, past surgical history, social history, or family history were reviewed personally by me duringthe office visit. All problems listed in the assessment are stable unless noted otherwise. Medication profile reviewed personally by me during the office visit. Medication side effects and possible impairments frommedications were discussed as applicable.      Objective:     Physical Exam  Vitals and nursing note reviewed. Constitutional:       General: He is not in acute distress. Appearance: Normal appearance. He is well-developed. He is not toxic-appearing. HENT:      Head: Normocephalic and atraumatic. Right Ear: Hearing, tympanic membrane, ear canal and external ear normal.      Left Ear: Hearing, tympanic membrane, ear canal and external ear normal.      Nose: Nose normal.      Mouth/Throat:      Pharynx: Uvula midline. Eyes:      General: Lids are normal.      Conjunctiva/sclera: Conjunctivae normal.      Pupils: Pupils are equal, round, and reactive to light. Neck:      Thyroid: No thyromegaly. Vascular: No carotid bruit or JVD. Cardiovascular:      Rate and Rhythm: Normal rate and regular rhythm. Pulses: Normal pulses. Radial pulses are 2+ on the right side and 2+ on the left side. Dorsalis pedis pulses are 2+ on the right side and 2+ on the left side. Posterior tibial pulses are 2+ on the right side and 2+ on the left side. Heart sounds: Normal heart sounds. No murmur heard. No friction rub. No gallop. Pulmonary:      Effort: Pulmonary effort is normal. No accessory muscle usage or respiratory distress. Breath sounds: Normal breath sounds. Abdominal:      General: Bowel sounds are normal.      Palpations: Abdomen is soft. There is no mass. Tenderness: There is no abdominal tenderness. Musculoskeletal:      Cervical back: Neck supple. Spasms and tenderness present. Decreased range of motion. Feet:    Lymphadenopathy:      Head:      Right side of head: No submental or submandibular adenopathy. Left side of head: No submental or submandibular adenopathy. Cervical: No cervical adenopathy. Skin:     General: Skin is warm and dry. Findings: No lesion or rash. Neurological:      Mental Status: He is alert and oriented to person, place, and time.       Gait: Gait normal.   Psychiatric: Speech: Speech normal.         Behavior: Behavior normal. Behavior is cooperative. Thought Content: Thought content normal.         Judgment: Judgment normal.         /70 (Site: Left Upper Arm, Position: Sitting, Cuff Size: Large Adult)   Pulse 82   Ht 5' 4\" (1.626 m)   Wt 290 lb (131.5 kg)   SpO2 97%   BMI 49.78 kg/m²   Body mass index is 49.78 kg/m².     BP Readings from Last 2 Encounters:   06/29/22 109/70   05/26/22 110/78       Wt Readings from Last 3 Encounters:   06/29/22 290 lb (131.5 kg)   05/26/22 290 lb (131.5 kg)   05/18/22 293 lb (132.9 kg)       Lab Review   Hospital Outpatient Visit on 05/27/2022   Component Date Value    Cholesterol, Total 05/27/2022 95     Triglycerides 05/27/2022 199*    HDL 05/27/2022 28*    LDL Calculated 05/27/2022 27     VLDL Cholesterol Calcula* 05/27/2022 40    Admission on 05/13/2022, Discharged on 05/13/2022   Component Date Value    WBC 05/13/2022 10.9     RBC 05/13/2022 5.42     Hemoglobin 05/13/2022 15.6     Hematocrit 05/13/2022 46.5     MCV 05/13/2022 85.8     MCH 05/13/2022 28.9     MCHC 05/13/2022 33.6     RDW 05/13/2022 16.4*    Platelets 41/62/5789 176     MPV 05/13/2022 7.7     Neutrophils % 05/13/2022 74.4     Lymphocytes % 05/13/2022 15.8     Monocytes % 05/13/2022 6.6     Eosinophils % 05/13/2022 2.1     Basophils % 05/13/2022 1.1     Neutrophils Absolute 05/13/2022 8.1*    Lymphocytes Absolute 05/13/2022 1.7     Monocytes Absolute 05/13/2022 0.7     Eosinophils Absolute 05/13/2022 0.2     Basophils Absolute 05/13/2022 0.1     Sodium 05/13/2022 139     Potassium reflex Magnesi* 05/13/2022 4.6     Chloride 05/13/2022 102     CO2 05/13/2022 29     Anion Gap 05/13/2022 8     Glucose 05/13/2022 139*    BUN 05/13/2022 17     CREATININE 05/13/2022 0.9     GFR Non- 05/13/2022 >60     GFR  05/13/2022 >60     Calcium 05/13/2022 9.1     Total Protein 05/13/2022 6.9     Albumin 05/13/2022 3.7     Albumin/Globulin Ratio 05/13/2022 1.2     Total Bilirubin 05/13/2022 0.3     Alkaline Phosphatase 05/13/2022 113     ALT 05/13/2022 28     AST 05/13/2022 20    Office Visit on 05/09/2022   Component Date Value    WBC 05/09/2022 9.9     RBC 05/09/2022 5.57     Hemoglobin 05/09/2022 16.0     Hematocrit 05/09/2022 49.2     MCV 05/09/2022 88.4     MCH 05/09/2022 28.8     MCHC 05/09/2022 32.6     RDW 05/09/2022 17.4*    Platelets 06/69/7892 197     MPV 05/09/2022 7.9     Neutrophils % 05/09/2022 73.7     Lymphocytes % 05/09/2022 17.6     Monocytes % 05/09/2022 6.8     Eosinophils % 05/09/2022 1.5     Basophils % 05/09/2022 0.4     Neutrophils Absolute 05/09/2022 7.3     Lymphocytes Absolute 05/09/2022 1.7     Monocytes Absolute 05/09/2022 0.7     Eosinophils Absolute 05/09/2022 0.2     Basophils Absolute 05/09/2022 0.0     Lyme, EIA 05/09/2022 0.10     IgG,John Mtn Spotted Fe* 05/09/2022 <1:64     JOHN MOUNTAIN SPOTTED F* 05/09/2022 <1:64        No results found for this visit on 06/29/22. Assessment:       1. Cervical spine pain    2. Lymphadenopathy of head and neck    3. Cervical radicular pain    4. Pain around toenail, left foot        No results found for this visit on 06/29/22. Plan:       Ayala Harrison was seen today for other and other. Diagnoses and all orders for this visit:    Cervical spine pain  -     CT CERVICAL SPINE WO CONTRAST; Future    Lymphadenopathy of head and neck  -     CT SOFT TISSUE NECK W CONTRAST; Future    Cervical radicular pain  -     CT CERVICAL SPINE WO CONTRAST; Future    Pain around toenail, left foot  -     LIZANDRO - Irina Mohan DPM, Podiatry, [facility]    I did discuss with the patient his prior imaging and blood work done at the ER last month. Also reviewed and discussed ENT consult notes. On exam there is either minor palpable lymphadenopathy versus spinal process inflammation.   I do feel like the patient's symptoms are most consistent with inflammation in the cervical spine with possible radiculopathy causing pain to go in the neck and jaw  Patient denies any type of cardiac pain or other cardiac symptoms. At this time we will go ahead and repeat the CT soft tissue neck to see if there is been any change in his lymph nodes since his last CT. Patient also has appointment tomorrow with the spine specialist and I did recommend that he discuss his neck pain with the spine specialist to see what suggestions they may have. At this time patient should continue with warm compresses, exercises at home and muscle relaxer as prescribed    As for his left great toenail patient is wanting it removed so podiatry consult was given to the patient    Patient has been instructed call the office immediately with new symptoms, change in symptoms or worseningof symptoms. If this is not feasible, patient is instructed to report to the emergency room. Medication profile reviewed. Medication side effects and possible impairments from medications were discussed as applicable. Allergies were reviewed. Health maintenance was reviewed and updated as appropriate. (Comment: Please note this report has been produced using a combination of typing and speech recognition software and may contain errors related to that system including errors in grammar, punctuation, and spelling, as well as words and phrases that may be inappropriate.  If there are any questions or concerns please feel free to contact the dictating provider for clarification.)

## 2022-06-30 ENCOUNTER — OFFICE VISIT (OUTPATIENT)
Dept: ORTHOPEDIC SURGERY | Age: 62
End: 2022-06-30
Payer: COMMERCIAL

## 2022-06-30 ENCOUNTER — TELEPHONE (OUTPATIENT)
Dept: CARDIOLOGY CLINIC | Age: 62
End: 2022-06-30

## 2022-06-30 VITALS — BODY MASS INDEX: 49.51 KG/M2 | HEIGHT: 64 IN | WEIGHT: 290 LBS

## 2022-06-30 DIAGNOSIS — R52 PAIN: ICD-10-CM

## 2022-06-30 DIAGNOSIS — M51.36 DDD (DEGENERATIVE DISC DISEASE), LUMBAR: Primary | ICD-10-CM

## 2022-06-30 PROCEDURE — G8417 CALC BMI ABV UP PARAM F/U: HCPCS | Performed by: ORTHOPAEDIC SURGERY

## 2022-06-30 PROCEDURE — 1036F TOBACCO NON-USER: CPT | Performed by: ORTHOPAEDIC SURGERY

## 2022-06-30 PROCEDURE — G8427 DOCREV CUR MEDS BY ELIG CLIN: HCPCS | Performed by: ORTHOPAEDIC SURGERY

## 2022-06-30 PROCEDURE — 99213 OFFICE O/P EST LOW 20 MIN: CPT | Performed by: ORTHOPAEDIC SURGERY

## 2022-06-30 PROCEDURE — 3017F COLORECTAL CA SCREEN DOC REV: CPT | Performed by: ORTHOPAEDIC SURGERY

## 2022-06-30 PROCEDURE — MISCD282 ADJUSTA LIFT: Performed by: ORTHOPAEDIC SURGERY

## 2022-06-30 ASSESSMENT — ENCOUNTER SYMPTOMS
BACK PAIN: 1
BLOOD IN STOOL: 0
SINUS PAIN: 0
ANAL BLEEDING: 0
SORE THROAT: 0
TROUBLE SWALLOWING: 0
ABDOMINAL PAIN: 0
FACIAL SWELLING: 0
SINUS PRESSURE: 0
ALLERGIC/IMMUNOLOGIC NEGATIVE: 1
COUGH: 0
GASTROINTESTINAL NEGATIVE: 1
RESPIRATORY NEGATIVE: 1
NAUSEA: 0
EYES NEGATIVE: 1
SHORTNESS OF BREATH: 0
RHINORRHEA: 0

## 2022-06-30 NOTE — PROGRESS NOTES
New Patient: LUMBAR SPINE    Referring Provider:  DANIELE Little    CHIEF COMPLAINT:    Chief Complaint   Patient presents with    Back Pain     Patient states that this back pain began a few months ago. Patient has low back pain and bilateral posterior leg pain and numbness/tingling in bilateral legs and feet. In the past he has had GABRIEL with great relief but no recent treatment. HISTORY OF PRESENT ILLNESS:     Mr. Stephen Rodrigues  is a pleasant 63 y.o. male here for consultation regarding his LBP and bilateral leg pain. He states his pain began insidiously several months ago. His pain has steadily increased since then. He rates his back pain 7/10 and leg pain 7/10. He describes the pain as aching. Pain is worse with standing and walking and improved some with sitting in a recliner. The leg pain radiates down the anterior aspect of his leg to his feet. He notes intermittent numbness and tingling in his legs and feet. He denies weakness of his legs and denies saddle numbness or bowel or bladder dysfunction. The pain moderately disrupts his sleep. He has been using a cane for 3 months. He is currently in cardiac rehab.      Current/Past Treatment:   · Physical Therapy: Cardiac rehab  · Chiropractic:  No   · Injection:  GABRIEL previously with 2 years relief   · Medications:  Cymbalta     Past Medical History:   Past Medical History:   Diagnosis Date    Acute diverticulitis 04/10/2021    Acute kidney injury ([address] 04/08/2021    Acute on chronic respiratory failure with hypoxemia (HCC)     Acute respiratory failure ([address] 08/19/2020    Acute respiratory failure with hypoxia ([address]     Anxiety 01/06/2020    Aortic valve calcification 09/2020    seen on lung CT    CAD in native artery 04/14/2021    Chronic obstructive pulmonary disease ([address] 09/03/2019    PFT done 9/03/19    Chronic systolic congestive heart failure ([address] 04/10/2021    Class 3 severe obesity with body mass index (BMI) of 45.0 to 49.9 in adult [facility])     Coronary artery calcification 09/2020    seen on lung ct    Coronary artery disease involving native heart with angina pectoris ([facility] 09/22/2020    Crush injury of right foot 07/23/2015    DDD (degenerative disc disease), lumbar 01/06/2020    Depression 01/06/2020    Diverticulitis of colon 04/10/2021    Erectile dysfunction 01/06/2020    Fatigue 01/06/2020    HNP (herniated nucleus pulposus), lumbar 01/06/2020    Hyperglycemia 01/06/2020    Hyperlipidemia     Hypersomnia 01/06/2020    Hypertension     Insomnia     Ischemic cardiomyopathy     Kidney stone     Lumbar radiculopathy 01/06/2020    Lumbar spine pain 01/06/2020    LVH (left ventricular hypertrophy)     seen on echo 8/2020, moderate    Mobitz type I Wenckebach atrioventricular block 04/10/2021    Moderate protein-calorie malnutrition ([facility] 03/17/2020    Observed sleep apnea 01/06/2020    OANH (obstructive sleep apnea) 01/06/2020    no C-pap is getting tested    Pneumonia, primary atypical     Reactive depression 01/06/2020    S/P three vessel coronary artery bypass 10/26/2020    Spondylosis without myelopathy or radiculopathy, lumbar region 01/06/2020    Tobacco abuse 01/06/2020    Tobacco use 01/06/2020    Wears dentures     full set        Past Surgical History:     Past Surgical History:   Procedure Laterality Date    ANKLE SURGERY Left 8/2/2021    LEFT FOOT SCREW REMOVAL performed by Dionicia Frankel, MD at [address]  03/2021    CORONARY ARTERY BYPASS GRAFT N/A 9/25/2020    CORONARY ARTERY BYPASS GRAFTING X3, INTERNAL MAMMARY ARTERY, SAPHENOUS VEIN GRAFT, ON PUMP, STERNAL PLATING, 5 LEVEL BILATERAL INTERCOSTAL NERVE BLOCK, PLATELET GEL APPLICATION performed by Angely Mendoza MD at [address]  03/16/2011    cystoscopy left ureteroscopy, left retrograde, double J stent placement    FINGER AMPUTATION Right 2/2/2021 RIGHT MIDDLE FINGER IRRIGATION AND DEBRIDEMENT WITH REVISION AMPUTATION AND BONE SHORTENING, POSSIBLE NAIL ABLATION performed by Carol Jhaveri MD at [place] [address] Right 12/24/2019    RIGHT LUMBAR FIVE SACRAL ONE TRANSFORAMINAL EPIDURAL STEROID INJECTION SITE CONFIRMED BY FLUOROSCOPY performed by Bebo Roberts MD at [address] [facility] Right 1/7/2020    RIGHT LUMBAR FOUR AND LUMBAR FIVE TRANSFORAMINAL EPIDURAL STEROID INJECTION SITE CONFIRMED BY FLUOROSCOPY performed by Bebo Roberts MD at [place] [address]       Current Medications:     Current Outpatient Medications:     Omega-3 Fatty Acids (FISH OIL) 1000 MG CAPS, Take 2 capsules by mouth 2 times daily, Disp: 90 capsule, Rfl: 3    clopidogrel (PLAVIX) 75 MG tablet, Take 1 tablet by mouth daily, Disp: 90 tablet, Rfl: 3    lansoprazole (PREVACID) 30 MG delayed release capsule, Take 1 capsule by mouth daily, Disp: 30 capsule, Rfl: 1    benzonatate (TESSALON) 200 MG capsule, TAKE 1 CAPSULE BY MOUTH THREE TIMES A DAY AS NEEDED FOR COUGH, Disp: 30 capsule, Rfl: 1    furosemide (LASIX) 40 MG tablet, Take 1 tablet by mouth as needed (for weight grater than 300lbs), Disp: 90 tablet, Rfl: 3    metoprolol succinate (TOPROL XL) 25 MG extended release tablet, Take 1 tablet by mouth daily, Disp: 30 tablet, Rfl: 3    fluticasone-umeclidin-vilant (TRELEGY ELLIPTA) 100-62.5-25 MCG/INH AEPB, Inhale 1 puff into the lungs daily, Disp: 60 each, Rfl: 5    Cholecalciferol (VITAMIN D3) 1.25 MG (10594 UT) CAPS, 1 po a week, Disp: 4 capsule, Rfl: 5    atorvastatin (LIPITOR) 80 MG tablet, Take 1 tablet by mouth nightly, Disp: 90 tablet, Rfl: 3    albuterol (PROVENTIL) (2.5 MG/3ML) 0.083% nebulizer solution, Take 3 mLs by nebulization every 6 hours as needed for Wheezing or Shortness of Breath, Disp: 360 each, Rfl: 5    albuterol sulfate HFA (PROVENTIL HFA) 108 (90 Base) MCG/ACT inhaler, Inhale 2 puffs into the lungs every 4 hours as needed for Wheezing or Shortness of Breath, Disp: 18 g, Rfl: 5    DULoxetine (CYMBALTA) 30 MG extended release capsule, Take 90 mg by mouth daily, Disp: , Rfl:     hydrOXYzine (VISTARIL) 25 MG capsule, Take 25 mg by mouth in the morning and at bedtime , Disp: , Rfl:     isosorbide mononitrate (IMDUR) 30 MG extended release tablet, Take 2 tablets by mouth daily, Disp: 60 tablet, Rfl: 3    Multiple Vitamins-Minerals (THERAPEUTIC MULTIVITAMIN-MINERALS) tablet, Take 1 tablet by mouth daily, Disp: , Rfl:     traZODone (DESYREL) 100 MG tablet, Take 1 tablet by mouth 2 times daily Am pm, Disp: 60 tablet, Rfl: 5    OXYGEN, Inhale 2 L into the lungs as needed , Disp: , Rfl:     nitroGLYCERIN (NITROSTAT) 0.4 MG SL tablet, Place 1 tablet under the tongue every 5 minutes as needed for Chest pain, Disp: 25 tablet, Rfl: 3    aspirin 81 MG EC tablet, Take 1 tablet by mouth daily, Disp: 30 tablet, Rfl: 0    Allergies:  Dilaudid [hydromorphone hcl] and Codeine    Social History:    reports that he quit smoking about 2 years ago. His smoking use included cigarettes. He has a 70.00 pack-year smoking history. He has never used smokeless tobacco. He reports previous drug use. Drugs: Marijuana (Weed) and Cocaine. He reports that he does not drink alcohol.     Family History:   Family History   Problem Relation Age of Onset    Heart Disease Mother     Other Mother         copd    High Blood Pressure Sister     No Known Problems Sister        REVIEW OF SYSTEMS: Full ROS noted & scanned   CONSTITUTIONAL: Denies unexplained weight loss, fevers, chills or fatigue  NEUROLOGICAL: Denies unsteady gait or progressive weakness  MUSCULOSKELETAL: Denies joint swelling or redness  PSYCHOLOGICAL: Denies anxiety, depression   SKIN: Denies skin changes, delayed healing, rash, itching   HEMATOLOGIC: Denies easy bleeding or bruising  ENDOCRINE: Denies excessive thirst, urination, heat/cold  RESPIRATORY: Denies current dyspnea, cough  GI: Denies nausea, vomiting, diarrhea   : Denies bowel or bladder issues      PHYSICAL EXAM:    Vitals: Height 5' 4\" (1.626 m), weight 290 lb (131.5 kg). GENERAL EXAM:  · General Apparence: Patient is adequately groomed with no evidence of malnutrition. · Orientation: The patient is oriented to time, place and person. · Mood & Affect:The patient's mood and affect are appropriate. · Vascular: Examination reveals no swelling tenderness in upper or lower extremities. Good capillary refill. · Lymphatic: The lymphatic examination bilaterally reveals all areas to be without enlargement or induration  · Sensation: Sensation is intact without deficit  · Coordination/Balance: Good coordination. Tandem walking normal.     LUMBAR/SACRAL EXAMINATION:  · Inspection: Local inspection shows no step-off or bruising. Lumbar alignment is normal.  Sagittal and Coronal balance is neutral.      · Palpation:   No evidence of tenderness at the midline. No tenderness bilaterally at the paraspinal or trochanters. There is no step-off or paraspinal spasm. · Range of Motion: Lumbar flexion, extension and rotation are mildly limited due to pain. · Strength:   Strength testing is 5-/5 in all muscle groups tested. · Special Tests:   Straight leg raise and crossed SLR negative. Leg length and pelvis level. · Skin: There are no rashes, ulcerations or lesions. · Reflexes: Reflexes are symmetrically 1+ at the patellar and ankle tendons. Clonus absent bilaterally at the feet. · Gait & station: Patient ambulates with a cane. · Additional Examinations:   · RIGHT LOWER EXTREMITY: Inspection/examination of the right lower extremity does not show any tenderness, deformity or injury. Range of motion is unremarkable. There is no gross instability. There are no rashes, ulcerations or lesions.  Strength and tone are normal.  · LEFT LOWER EXTREMITY:  Inspection/examination of the left lower extremity does not show any tenderness, deformity or injury. Range of motion is unremarkable. There is no gross instability. There are no rashes, ulcerations or lesions. Strength and tone are normal.    Diagnostic Testing:    AP and lateral xray images of his lumbar spine were obtained in the office today and independently reviewed. They show multilevel facet arthropathy and DDD with multilevel vertebral osteophyte formation. No obvious acute fracture noted. Left leg length discrepancy noted. Impression:   Lumbar DDD    Plan:    We discussed treatment options including observation, oral steroids, physical therapy, epidural injections and additional imaging. He wishes to proceed with physical therapy and a left shoe lift. He will return or call to schedule a lumbar MRI if his symptoms fail to improve or worsen. He had cardiac pacemaker placed this year - would recommend lumbar CT myelogram if his pacemaker is not MRI compatible.

## 2022-07-01 ENCOUNTER — HOSPITAL ENCOUNTER (OUTPATIENT)
Dept: CARDIAC REHAB | Age: 62
Setting detail: THERAPIES SERIES
Discharge: HOME | End: 2022-07-01
Payer: COMMERCIAL

## 2022-07-01 PROCEDURE — 93798 PHYS/QHP OP CAR RHAB W/ECG: CPT

## 2022-07-06 ENCOUNTER — HOSPITAL ENCOUNTER (OUTPATIENT)
Dept: CARDIAC REHAB | Age: 62
Setting detail: THERAPIES SERIES
Discharge: HOME | End: 2022-07-06
Payer: COMMERCIAL

## 2022-07-06 PROCEDURE — 93798 PHYS/QHP OP CAR RHAB W/ECG: CPT

## 2022-07-08 ENCOUNTER — TELEPHONE (OUTPATIENT)
Dept: CARDIOLOGY CLINIC | Age: 62
End: 2022-07-08

## 2022-07-08 ENCOUNTER — TELEPHONE (OUTPATIENT)
Dept: FAMILY MEDICINE CLINIC | Age: 62
End: 2022-07-08

## 2022-07-08 ENCOUNTER — HOSPITAL ENCOUNTER (OUTPATIENT)
Dept: CT IMAGING | Age: 62
Discharge: HOME | End: 2022-07-08
Payer: COMMERCIAL

## 2022-07-08 ENCOUNTER — HOSPITAL ENCOUNTER (OUTPATIENT)
Age: 62
Discharge: HOME | End: 2022-07-08
Payer: COMMERCIAL

## 2022-07-08 ENCOUNTER — HOSPITAL ENCOUNTER (OUTPATIENT)
Dept: CARDIAC REHAB | Age: 62
Setting detail: THERAPIES SERIES
Discharge: HOME | End: 2022-07-08
Payer: COMMERCIAL

## 2022-07-08 DIAGNOSIS — Z01.89 ENCOUNTER FOR ROUTINE LABORATORY TESTING: Primary | ICD-10-CM

## 2022-07-08 DIAGNOSIS — Z01.89 ENCOUNTER FOR ROUTINE LABORATORY TESTING: ICD-10-CM

## 2022-07-08 DIAGNOSIS — M54.12 CERVICAL RADICULAR PAIN: ICD-10-CM

## 2022-07-08 DIAGNOSIS — R59.1 LYMPHADENOPATHY OF HEAD AND NECK: ICD-10-CM

## 2022-07-08 DIAGNOSIS — M54.2 CERVICAL SPINE PAIN: ICD-10-CM

## 2022-07-08 LAB
CREAT SERPL-MCNC: 1.2 MG/DL (ref 0.9–1.3)
GFR AFRICAN AMERICAN: >60
GFR NON-AFRICAN AMERICAN: >60

## 2022-07-08 PROCEDURE — 6360000004 HC RX CONTRAST MEDICATION: Performed by: NURSE PRACTITIONER

## 2022-07-08 PROCEDURE — 82565 ASSAY OF CREATININE: CPT

## 2022-07-08 PROCEDURE — 70491 CT SOFT TISSUE NECK W/DYE: CPT

## 2022-07-08 PROCEDURE — 36415 COLL VENOUS BLD VENIPUNCTURE: CPT

## 2022-07-08 PROCEDURE — 93798 PHYS/QHP OP CAR RHAB W/ECG: CPT

## 2022-07-08 RX ADMIN — IOPAMIDOL 75 ML: 755 INJECTION, SOLUTION INTRAVENOUS at 13:52

## 2022-07-08 NOTE — TELEPHONE ENCOUNTER
Pt's weight up 7lbs since  06/29, pitting edema both legs & SOB, Lili PAGE listened to lungs, diminished sounding no crackles or gurgles. Pt has been taking   furosemide (LASIX) 40 MG tablet  QD. Pt stating that he just does not feel very well. Please advise & thank you.      Please return call to the CAMILO Garnica at [address] [facility], [phone]

## 2022-07-08 NOTE — TELEPHONE ENCOUNTER
Shandra Masters with [facility] ER called in regards to the [address] and CT SOFT TISSUE NECK W CONTRAST. States that since this is a lot of radiation they are going to do [address] instead.

## 2022-07-08 NOTE — TELEPHONE ENCOUNTER
Discharge instructions were given to the patient's family by Oniel Lu RN. The patient left the Emergency Department ambulatory, alert, and appropriate for age with 2 prescription(s). The patient's family were encouraged to call or to return to the ED for worsening symptoms or problems. The patient's family voiced understanding of discharge instructions. All questions were answered and there are no further concerns at this time. Patient's family encouraged to schedule a follow up appointment for continuing care. Spoke with cardiac rehab. Patient is activity exercising now. Just feels more swollen and SOB. Weight is up up 7lbs since  06/29.

## 2022-07-08 NOTE — TELEPHONE ENCOUNTER
Have him take 40 mg BID today/tomorrow, then back to once daily. Track weights, ensure go back to baseline. Update us Monday if fail to go down.  BAO CASTRO

## 2022-07-11 ENCOUNTER — HOSPITAL ENCOUNTER (OUTPATIENT)
Dept: CARDIAC REHAB | Age: 62
Setting detail: THERAPIES SERIES
Discharge: HOME | End: 2022-07-11
Payer: COMMERCIAL

## 2022-07-11 PROCEDURE — 93798 PHYS/QHP OP CAR RHAB W/ECG: CPT

## 2022-07-12 ENCOUNTER — NURSE ONLY (OUTPATIENT)
Dept: CARDIOLOGY CLINIC | Age: 62
End: 2022-07-12
Payer: COMMERCIAL

## 2022-07-12 DIAGNOSIS — Z95.0 PACEMAKER: ICD-10-CM

## 2022-07-12 DIAGNOSIS — I44.2 CHB (COMPLETE HEART BLOCK) (HCC): ICD-10-CM

## 2022-07-12 PROCEDURE — 93296 REM INTERROG EVL PM/IDS: CPT | Performed by: INTERNAL MEDICINE

## 2022-07-12 PROCEDURE — 93294 REM INTERROG EVL PM/LDLS PM: CPT | Performed by: INTERNAL MEDICINE

## 2022-07-13 ENCOUNTER — HOSPITAL ENCOUNTER (OUTPATIENT)
Dept: CARDIAC REHAB | Age: 62
Setting detail: THERAPIES SERIES
Discharge: HOME | End: 2022-07-13
Payer: COMMERCIAL

## 2022-07-13 PROCEDURE — 93798 PHYS/QHP OP CAR RHAB W/ECG: CPT

## 2022-07-13 NOTE — CARDIO/PULMONARY
Pt is having his left great toe nail worked on 07/14 and he might not be at Friday session if toe is sore.

## 2022-07-15 ENCOUNTER — HOSPITAL ENCOUNTER (OUTPATIENT)
Dept: CARDIAC REHAB | Age: 62
Setting detail: THERAPIES SERIES
Discharge: HOME | End: 2022-07-15
Payer: COMMERCIAL

## 2022-07-18 ENCOUNTER — HOSPITAL ENCOUNTER (OUTPATIENT)
Dept: CARDIAC REHAB | Age: 62
Setting detail: THERAPIES SERIES
Discharge: HOME | End: 2022-07-18
Payer: COMMERCIAL

## 2022-07-18 NOTE — PROGRESS NOTES
Patient called and reports he cannot exercise today. Pt had his toenail removed last week and it is hurting this morning. Pt plans to return on 7/20.

## 2022-07-19 ENCOUNTER — HOSPITAL ENCOUNTER (OUTPATIENT)
Dept: PHYSICAL THERAPY | Age: 62
Setting detail: THERAPIES SERIES
Discharge: HOME | End: 2022-07-19
Payer: COMMERCIAL

## 2022-07-19 PROCEDURE — 97110 THERAPEUTIC EXERCISES: CPT

## 2022-07-19 PROCEDURE — 97140 MANUAL THERAPY 1/> REGIONS: CPT

## 2022-07-19 PROCEDURE — 97161 PT EVAL LOW COMPLEX 20 MIN: CPT

## 2022-07-19 NOTE — PROGRESS NOTES
[facility]  [address]     [place], [facility]  Phone: [phone]       Fax:   [phone]       Physical Therapy Certification    Dear Referring Provider (secondary): Laila Paiz,    We had the pleasure of evaluating the following patient for physical therapy services at [address] [facility]. A summary of our findings can be found in the initial assessment below. This includes our plan of care. If you have any questions or concerns regarding these findings, please do not hesitate to contact me at the office phone number above. Thank you for the referral.       Physician/Provider Signature:_______________________________Date:__________________  By signing above (or electronic signature), therapist's plan is approved by physician      Patient: Lorri Jimenez   : 1960   MRN: [id number]    Referring Provider (secondary): Laila Paiz,        Evaluation Date: 2022        Medical Diagnosis Information:  Diagnosis: DDD, Lumbar M51.36                                               Insurance information:  [employer]      Precautions/ Contra-indications:   Latex Allergy:  [x]NO      []YES     Preferred Language for Healthcare:   [x]English       []other:    C-SSRS Triggered by Intake questionnaire (Past 2 wk assessment):   [] No, Questionnaire did not trigger screening. [x] Yes, Patient intake triggered further evaluation      [x] C-SSRS Screening completed  [x] PCP notified via Plan of Care  [] Emergency services notified      SUBJECTIVE FINDINGS      History of Present Illness:      Pt presents with C/o severe back and neck pain. 2 years ago, he had similar symptoms and was given cortisone injections. The cortisone injections helped tremendously. The pain has recently returned and is as bad as it was the last time. He was interested in additional cortisone injections again, but was referred to therapy.    Has undergone xray with Dx of AROM  (% Decreased) COMMENTS   Flexion Dec 50 percent    Extension 90    Sidebending Left 90    Sidebending Right 90      Special Tests- Standing   (C)= Tl's Criteria: 3/4 Positive tests or (+) Fortins sign with two additional (+) tests  Special Test Abnormal Findings   Dahiana's Sign                (C)                  []Neg   [x]Pos R   []Pos L      Standing Landmarks []Iliac Crests Equal   []R High  [x]L High  []PSIS Equal   []R High  []L High  []ASIS Equal   []R High  []L High     []Difficult to assess due to body habitus    Standing Flexion Test  (C) []Neg   []Pos R   []Pos L      March Test (Gillet's Test) []Neg   []Pos R   []Pos L      Sacral Sulci Test  []Neg   [x]Pos R   []Pos L          Deep Tendon Reflexes     [x]All reflexes WNL or 2+ except as marked below  Abnormal Reflex Findings Left Right Comments   Lamar's Reflex      Biceps (C5,6)      Brachioradialis (C6)      Triceps (C7)      Quadriceps (L3,4)   2+ 2+ []Pendular x 3 R  []Pendular x 3 L   Achilles (S1,2) 2+ 2+    Ankle clonus , # of beats      Babinski's reflex           Dermatomal Sensation   [x]All dermatomes WFL for light touch except as marked below  Abnormal Dermatome Findings Left Right   Anterior groin, 2-3 inches below ASIS (L1-L2)     Middle third anterior thigh (L3)     Patella and med malleolus (L4)     Fibular head and dorsum of foot (L5)     Lateral side and plantar surface of foot (S1)     Medial aspect of posterior thigh (S2)                   Range of Motion/Strength Testing-Myotomes    [x]All ROM WFL except as marked below   [x]All strength WFL (5/5) except as marked below    [x]All myotomes WFL (5/5) except as marked below     Range Tested MMT/ Resisted PROM AROM Comments   *denotes pain Left Right Left Right Left Right    Hip Flexion  (L1-2) 4 4        Hip Extension          Hip Abduction  (L5) 4 4        Hip Adduction  (L3) 3 3        Hip IR          Hip ER          Knee Flexion  (L5,S1) 4+ +        Knee Extension  (L3,4) 4- 4-        Ankle Dorsiflex  (L4) 4 4        Ankle Plantarflex  (S1,2) 3 3        Ankle Inversion          Ankle Eversion          Great Toe Ext  (L5)            [x] Not Tested  Trunk Strength     Trunk Extensors 3-    Gluteals 2+    Abdominals 2+        Flexibility    [] All tested [facility]    [] Deficits indicated as follows:    Muscle Abnormal Findings   Hip flexors/Bull  []Decreased R   []Decreased L      Hamstrings  Degrees in 90/90 [x]Decreased R   [x]Decreased L     Right:              Left:      Gastrocs   []Decreased R   []Decreased L      Obers/TFL/ITB   []Decreased R   []Decreased L      Piriformis    []Decreased R   []Decreased L      Other:    []Decreased R   []Decreased L        Special Tests Lumbosacral and hip- supine/sidelying/prone    (L) = Anthony's Criteria: 2 positive tests  Special Test Abnormal Findings   Sit up test/ Supine Long sit test  (C) []Neg   [x]Pos R   []Pos L     Comments:    SI distraction                               (L) []Neg   []Pos   []NT   Thigh Thrust test                         (L) []Neg   []Pos R   []Pos L      90/90 test  [x]Neg   []Pos R   []Pos L      Gaenslen's test []Neg   []Pos R   []Pos L      Straight Leg Raise [x]Neg   []Pos R   []Pos L      Crams []Neg   []Pos R   []Pos L      Lumbar Distraction  []Relief noted   [x]No relief noted  [x]Absent Rebound pain   []NT   Hip scour [x]Neg   []Pos R   []Pos L      Thao's test [x]Neg   []Pos R   []Pos L      Roney's test [x]Neg   []Pos R   []Pos L      Oscillation []Neg   []Pos R   []Pos L      Ant/Post Provocation  []Neg   []Pos R   []Pos L      SI compression                           (L) []Neg   [x]Pos      Prone knee flexion test               (C) []Neg   []Pos R   []Pos L     Comments:    Femoral nerve tension test []Neg   []Pos R   []Pos L      Pheasant test []Neg   []Pos R   []Pos L      Sacral thrusts                              (L) []Neg   []Pos     []Base   [][place]   []R Sacral Sulcus  []L Sacral Sulcus   []R REMY   []L REMY     Palpation     Patient reported tenderness with palpation:  []Yes :   []No       [x] Patient history, allergies, meds reviewed. Medical chart reviewed. See intake form. Review Of Systems (ROS):  [x]Performed Review of systems (Integumentary, CardioPulmonary, Neurological) by intake and observation. Intake form has been scanned into medical record. Patient has been instructed to contact their primary care physician regarding ROS issues if not already being addressed at this time. Co-morbidities/Complexities (which will affect course of rehabilitation):  []None           Arthritic conditions   []Rheumatoid arthritis (M05.9)  []Osteoarthritis (M19.91)   Cardiovascular conditions   []Hypertension (I10)  []Hyperlipidemia (E78.5)  []Angina pectoris (I20)  []Atherosclerosis (I70)   Musculoskeletal conditions   []Disc pathology   []Congenital spine pathologies   [x]Prior surgical intervention - cardiac  []Osteoporosis (M81.8)  []Osteopenia (M85.8)   Endocrine conditions   []Hypothyroid (E03.9)  []Hyperthyroid Gastrointestinal conditions   []Constipation (W57.58)   Metabolic conditions   [x]Morbid obesity (E66.01)  []Diabetes type 1(E10.65) or 2 (E11.65)   []Neuropathy (G60.9)     Pulmonary conditions   []Asthma (J45)  []Coughing   []COPD (J44.9)   Psychological Disorders  []Anxiety (F41.9)  [x]Depression (F32.9)   []Other:   []Other:            Barriers to/and or personal factors that will affect rehab potential:       []None                  []Age   []Sex     []Smoker              []Motivation/Lack of Motivation                        [x]Co-Morbidities              []Cognitive Function, education/learning barriers              []Environmental, home barriers              []profession/work barriers   []past PT/medical experience   []other:  Justification:     Falls Risk Assessment (30 days): [] NA  [x] Falls Risk assessed and no intervention required.   [] Falls Risk assessed and Patient requires intervention due to being higher risk   Tinetti  score   [] Falls education provided, including:         ASSESSMENT: Pt is  63 Y. O  fe/male, presenting with c/o  severe low back pain with sacral component. Assessment reveals deficits in strength, ROM, alignment as well as increased pain. Pt will benefit from cont PT to address these deficits and promote return to highest level of functional independence. Functional Impairments:    Noted lumbar/proximal hip hypomobility  Noted lumbosacral and/or generalized hypermobility  Decreased core/proximal hip strength and neuromuscular control      Functional Activity Limitations (from functional questionnaire and intake)  Reduced ability to tolerate prolonged functional positions  Reduced ability to maintain good posture and demonstrate good body mechanics with sitting, bending, and lifting  Reduced ability to sleep  Reduced ability or tolerance with driving and/or computer work  Reduced ability to forward bend  other:Inability to tolerate riding motorcycle     Participation Restrictions  Reduced participation in self care activities  Reduced participation in home management activities  Reduced participation in social activities. Reduced participation in sport activities. Classification:  Signs/symptoms consistent with Lumbar mobilization/manipulation subgroup, myotomes and dermatomes intact. Meets manipulation criteria.    Signs/symptoms consistent with Lumbar direction specific/centralization subgroup  Signs/symptoms consistent with Lumbar traction subgroup  Signs/symptoms consistent with lumbar stenosis type dysfunction    Prognosis/Rehab Potential:     Good    Tolerance of evaluation/treatment:   Good      Prognosis/Rehab Potential:      []Excellent   [x]Good    []Fair   []Poor    Tolerance of evaluation/treatment:    []Excellent   [x]Good    []Fair   []Poor     Physical Therapy Evaluation Complexity Justification  [x] A history of present problem with:  [] no personal factors and/or comorbidities that impact the plan of care;  []1-2 personal factors and/or comorbidities that impact the plan of care  []3 personal factors and/or comorbidities that impact the plan of care  [x] An examination of body systems using standardized tests and measures addressing any of the following: body structures and functions (impairments), activity limitations, and/or participation restrictions;:  [] a total of 1-2 or more elements   [] a total of 3 or more elements   [] a total of 4 or more elements   [x] A clinical presentation with:  [] stable and/or uncomplicated characteristics   [] evolving clinical presentation with changing characteristics  [] unstable and unpredictable characteristics;   [x] Clinical decision making of [x] low, [] moderate, [] high complexity using standardized patient assessment instrument and/or measurable assessment of functional outcome. [x] EVAL (LOW) 71903 (typically 20 minutes face-to-face)  [] EVAL (MOD) 06716 (typically 30 minutes face-to-face)  [] EVAL (HIGH) 53447 (typically 45 minutes face-to-face)  [] RE-EVAL     PLAN: Begin PT focusing on:    Pelvic alignment    Core strength    HEP    Frequency/Duration:  1-2 days per week for 6 Weeks:  Interventions:  [x]  Therapeutic exercise including: strength training, ROM, for LE, Glutes and core   [x]  NMR activation and proprioception for glutes , LE and Core   [x]  Manual therapy as indicated for Hip complex, LE and spine to include: Dry Needling/IASTM, STM, PROM, Gr I-IV mobilizations, manipulation. []  Modalities as needed that may include: thermal agents, E-stim, Biofeedback, US, iontophoresis as indicated  []  Patient education on joint protection, postural re-education, activity modification, progression of HEP.     HEP instruction: Pt provided HEP via [facility]     GOALS:  Patient stated goal:  To be able to walk again without pain and not need the walker  [] Progressing:

## 2022-07-19 NOTE — FLOWSHEET NOTE
Physical Therapy Daily Treatment Note    [x]Daily Tx Note    []Progress Note    [] Discharge Summary         Date:  2022    Patient Name:  Noe Matson    :  1960  MRN: [id number]      Medical/Treatment Diagnosis Information:  Diagnosis: DDD, Lumbar M51.36   Pelvic alignment syndrome with sacral component    Insurance/Certification information:   [facility]     Physician Information:  Referring Provider (secondary): Khalida Mohr      Plan of care sent to provider:      []Faxed   []Co-signature    (attempts: 1[x] 22 2 []3[])     Plan of care signed :  []  Yes  [x] No      Date of Patient follow up with Physician:     Is this a Progress Report:     []  Yes  [x]  No      If Yes:  Date Range for reporting period:  Beginning  2022  Ending    Progress report will be due (10 Rx or 30 days whichever is less):    3/95/30    Recertification will be due (POC Duration  / 90 days whichever is less): 10/19/22      Visit # Insurance Allowable Auth Required    []  Yes []  No        Functional Scale:  FOTO    Date    2022  Score   29  Predicted Score: 44  Predicted Visits:  11    Latex Allergy:  [x]NO      []YES  Preferred Language for Healthcare:   [x]English       []other:    RESTRICTIONS/PRECAUTIONS:      SUBJECTIVE:  See eval    Pain level:  8/10      Plan Moving Forward/ For next visit:    Reassess alignment    Manual tx     Progress HEP      OBJECTIVE: See eval        Exercises/Interventions:     Exercises in bold performed in department today. Items not bolded are carried forward from prior visits for continuity of the record.   Exercise/Equipment Resistance/Repetitions HEP Other comments       []      SIt to stand with GS x10 [x]      GS  x10 [x]      Bridge x10 [x]        []        []        []        []        []          []         []        []        []        []        []        []        []        []      Therapeutic Exercise/Home Exercise Program:   20 minutes  Pt education provided regarding anatomy and physiology associated with dx, etiology of Symptoms and plan of care. All questions answered to pt satisfaction. Therapeutic Activity:  0 minutes     Gait: 0 minutes    Neuromuscular Re-Education:  0 minutes      Canalith Repositioning Procedure:      Manual Therapy:  10 minutes  MET to reduce pelvic rotation with sacral torsion. R LE short in supine. Sacrum prominent on LEFT   Long axis distraction mob. Modalities: 0 minutes          ASSESSMENT:  see eval  GOALS:  Patient stated goal:  To be able to walk again without pain and not need the walker  [] Progressing: [] Met: [] Not Met: [] Adjusted        Therapist goals for Patient:  Short Term Goals: To be achieved in: 2 weeks  1. Independent in HEP and progression per patient tolerance, in order to prevent re-injury. [] Progressing: [] Met: [] Not Met: [] Adjusted  2. Patient will have a decrease in pain to facilitate improvement in movement, function, and ADLs as indicated by Functional Deficits. [] Progressing: [] Met: [] Not Met: [] Adjusted        Long Term Goals: To be achieved in: 6 weeks  1. Disability index score of 44 or Greater per FOTO to assist with reaching prior level of function. [] Progressing: [] Met: [] Not Met: [] Adjusted  2. Patient will demonstrate increased AROM to WNL, good LS mobility, good hip ROM to allow for proper joint functioning as indicated by patients Functional Deficits. [] Progressing: [] Met: [] Not Met: [] Adjusted  3. Patient will demonstrate an increase in Strength to good proximal hip and core activation to allow for proper functional mobility as indicated by patients Functional Deficits. [] Progressing: [] Met: [] Not Met: [] Adjusted  4. Patient will return to walking and moving around his house without increased symptoms or restriction. [] Progressing: [] Met: [] Not Met: [] Adjusted  5.  Pt to return to (patient specific functional goal)    [] Progressing: [] Met: [] Not Met: [] Adjusted                  Overall Progression Towards Functional goals/ Treatment Progress Update:  [] Patient is progressing as expected towards functional goals listed. [] Progression is slowed due to complexities/Impairments listed. [] Progression has been slowed due to co-morbidities. [x] Plan just implemented, too soon to assess goals progression <30days   [] Goals require adjustment due to lack of progress  [] Patient is not progressing as expected and requires additional follow up with physician  [] Other    Prognosis for POC: [x] Good [] Fair  [] Poor    Patient requires continued skilled intervention: [x] Yes  [] No    Treatment/Activity Tolerance:  [x] Patient able to complete treatment  [] Patient limited by fatigue  [] Patient limited by pain    [] Patient limited by other medical complications  [] Other:         PLAN: See eval  [] Continue per plan of care [] Alter current plan (see comments above)  [x] Plan of care initiated [] Hold pending MD visit [] Discharge        Therapeutic Exercise and NMR EXR  [] (62196) Provided verbal/tactile cueing for activities related to strengthening, flexibility, endurance, ROM  for improvements in proximal strength and core control with self care, mobility, lifting and ambulation.  [] (70622) Provided verbal/tactile cueing for activities related to improving balance, coordination, kinesthetic sense, posture, motor skill, proprioception  to assist with core control in self care, mobility, lifting, and ambulation.      Therapeutic Activities and Gait:    [] (84874 or 02676) Provided verbal/tactile cueing for activities related to improving balance, coordination, kinesthetic sense, posture, motor skill, proprioception and motor activation to allow for proper function  with self care and ADLs  [] (42099) Provided training and instruction to the patient for proper core and proximal hip recruitment and positioning with ambulation re-education     Home Exercise Program:    [x] (18565) Reviewed/Progressed HEP activities related to strengthening, flexibility, endurance, ROM of core, proximal hip and LE for functional self-care, mobility, lifting and ambulation   [] (58794) Reviewed/Progressed HEP activities related to improving balance, coordination, kinesthetic sense, posture, motor skill, proprioception of core, proximal hip and LE for self care, mobility, lifting, and ambulation      Manual Treatments:  PROM / STM / Oscillations-Mobs:  G-I, II, III, IV (PA's, Inf., Post.)  [] (05992) Provided manual therapy to mobilize proximal hip and LS spine soft tissue/joints for the purpose of modulating pain, promoting relaxation,  increasing ROM, reducing/eliminating soft tissue swelling/inflammation/restriction, improving soft tissue extensibility and allowing for proper ROM for normal function with self care, mobility, lifting and ambulation. []CRP:  Canalith Repositioning procedure for the assessment, treatment and education of BPPV    Modalities:       Charges:  Timed Code Treatment Minutes: 30   Total Treatment Minutes: 55     Medicare Cap total YTD:        [x]N/A  Workers Comp Time Stamp  (Per CPT and Total Treatment) [x]N/A   Time In:   Time Out:       [x] EVAL    [] Dry Needling  [x] RE(40371)   x  1   [] EStim Unattended 02034  [] NMR (15621)  x     [] Estim Attended  16284  [x] Manual (33338)  x   1   [] Mechanical Txn 37952  [] TA    x     [] Ultrasound  [] Gait   x  [] Vaso  [] CRP    [] Ionto           [] Other:        Electronically signed by: Milton Green, PT,MPT, OMT-c [id number]      Note: If patient does not return for scheduled/ recommended follow up visits, this note will serve as a discharge from care along with most recent update on progress.

## 2022-07-20 ENCOUNTER — HOSPITAL ENCOUNTER (OUTPATIENT)
Dept: CARDIAC REHAB | Age: 62
Setting detail: THERAPIES SERIES
Discharge: HOME | End: 2022-07-20
Payer: COMMERCIAL

## 2022-07-20 VITALS — BODY MASS INDEX: 50.64 KG/M2 | WEIGHT: 295 LBS

## 2022-07-20 PROCEDURE — 93798 PHYS/QHP OP CAR RHAB W/ECG: CPT

## 2022-07-20 ASSESSMENT — EXERCISE STRESS TEST
PEAK_HR: 104
PEAK_RPE: 13
PEAK_METS: 1.8

## 2022-07-20 NOTE — FLOWSHEET NOTE
07/20/22 1344   Rehab Common Questions   Any Problems or Changes Since Your Last Visit  Other (comment)  (R great toe nail removed. toe hurts.)   Any Symptoms While Exercising denies   Resting EKG Rhythm SR   Tobacco Use None   Visit Number/Total Visits 29/36   On Call Medical Director Immediately Available Amirah/Miles   Exercise Treatment Log   Target Heart Rate (Range) 108/126   Resting HR 98   Resting /60   Recovery HR 69   Recovery /64   Weight 295 lb (133.8 kg)   Exercise EKG Rhythm ST   Exercise Duration 32 minutes   Peak    Peak RPE 13   Peak Mets 1.8   Patient doing well with exercise. R great toe pain limits pt exercise today. Pt in target RPE zone. Education on building a healthy plate.

## 2022-07-22 ENCOUNTER — HOSPITAL ENCOUNTER (OUTPATIENT)
Dept: CARDIAC REHAB | Age: 62
Setting detail: THERAPIES SERIES
Discharge: HOME | End: 2022-07-22
Payer: COMMERCIAL

## 2022-07-22 PROCEDURE — 93798 PHYS/QHP OP CAR RHAB W/ECG: CPT

## 2022-07-25 ENCOUNTER — HOSPITAL ENCOUNTER (OUTPATIENT)
Dept: CARDIAC REHAB | Age: 62
Setting detail: THERAPIES SERIES
Discharge: HOME | End: 2022-07-25
Payer: COMMERCIAL

## 2022-07-25 PROBLEM — L60.0 INGROWING NAIL: Status: ACTIVE | Noted: 2022-07-25

## 2022-07-25 PROBLEM — M79.675 PAIN IN LEFT TOE(S): Status: ACTIVE | Noted: 2022-07-25

## 2022-07-25 PROBLEM — B35.1 TINEA UNGUIUM: Status: ACTIVE | Noted: 2022-07-25

## 2022-07-25 PROBLEM — M20.42 OTHER HAMMER TOE(S) (ACQUIRED), LEFT FOOT: Status: ACTIVE | Noted: 2022-07-25

## 2022-07-25 PROCEDURE — 93798 PHYS/QHP OP CAR RHAB W/ECG: CPT

## 2022-07-26 ENCOUNTER — HOSPITAL ENCOUNTER (OUTPATIENT)
Dept: PHYSICAL THERAPY | Age: 62
Setting detail: THERAPIES SERIES
Discharge: HOME | End: 2022-07-26
Payer: COMMERCIAL

## 2022-07-26 ENCOUNTER — TELEPHONE (OUTPATIENT)
Dept: ORTHOPEDIC SURGERY | Age: 62
End: 2022-07-26

## 2022-07-26 PROCEDURE — 97140 MANUAL THERAPY 1/> REGIONS: CPT

## 2022-07-26 PROCEDURE — 97110 THERAPEUTIC EXERCISES: CPT

## 2022-07-26 NOTE — FLOWSHEET NOTE
Physical Therapy Daily Treatment Note    [x]Daily Tx Note    []Progress Note    [] Discharge Summary           Date:  2022    Patient Name:  Marcy Lezama    :  1960  MRN: [id number]      Medical/Treatment Diagnosis Information:  Diagnosis: DDD, Lumbar M51.36   Pelvic alignment syndrome with sacral component    Insurance/Certification information:   [facility]     Physician Information:  Referring Provider (secondary): Gertrude Monsalve      Plan of care sent to provider:      []Faxed   []Co-signature    (attempts: 1[x] 22 2 []3[])     Plan of care signed :  []  Yes  [x] No      Date of Patient follow up with Physician:     Is this a Progress Report:     []  Yes  [x]  No      If Yes:  Date Range for reporting period:  Beginning  2022  Ending    Progress report will be due (10 Rx or 30 days whichever is less):        Recertification will be due (POC Duration  / 90 days whichever is less): 10/19/22      Visit # Insurance Allowable Auth Required    30 []  Yes []  No        Functional Scale:  FOTO    Date    2022  Score   29  Predicted Score: 44  Predicted Visits:  11    Latex Allergy:  [x]NO      []YES  Preferred Language for Healthcare:   [x]English       []other:    RESTRICTIONS/PRECAUTIONS:      SUBJECTIVE:    States that his pain is higher than eval.  It has him aggravated. States that the evaluation made him worse. He is unable to sleep and feels that he is almost at the level of needing his walker again. Rates pain a 7 today  Currently in cardiac rehab and states that it is all he can do to participate in those exercises. Has been unable to attempt his HEP exercises due to pain. Pain refers into hip, does not extend to knee region. Pain level:  7/10 at onset of tx today. Increased with manual techniques.   Eval  8/10      Plan Moving Forward/ For next visit:    Reassess alignment    Manual tx     Progress HEP      OBJECTIVE: See eval        Exercises/Interventions:     Exercises in bold performed in department today. Items not bolded are carried forward from prior visits for continuity of the record. Exercise/Equipment Resistance/Repetitions HEP Other comments       []      SIt to stand with GS x10 [x]      GS  x10 [x]      Bridge x10 [x]        []        []        []        []        []          []         []        []        []        []        []        []        []        []      Therapeutic Exercise/Home Exercise Program:   18 minutes  Pt education provided regarding anatomy and physiology associated with dx, etiology of Symptoms and plan of care. All questions answered to pt satisfaction. Review of complaints of lack of progress and exacerbation of pain. Pt advised to contact MD 2/2 poor tolerance to therapy sessions. Therapeutic Activity:  0 minutes     Gait: 0 minutes    Neuromuscular Re-Education:  0 minutes      Canalith Repositioning Procedure:      Manual Therapy:  21 minutes  MET to reduce pelvic rotation with sacral torsion. R LE short in supine. Sacrum prominent on LEFT   Long axis distraction mob. Manual lumbar distraction: Pain increases with force of pull, denies rebound symptoms    Modalities: 0 minutes          ASSESSMENT:    Mr. Felix Kowalski has been seen for 2 Visits of PT. Initial eval date 7/19/22. During the course of this visit, pt reports significant exacerbation of symptoms. Pt is unable to tolerate manual treatment techniques and/or HEP due to pain. Reports increased pain with manual traction trial.  Unable to tolerate Supine position in effort to trial mechanical txn. Pt repports that the Pain  limits sleep and activities affecting his mood and ADLs. Pt is very frustrated. Pt advised to contact MD for further guidance. Pt MD office stated that his insurance will require 6 weeks of PT in order to approve additional tx.   Pt will cont with PT per POC>       GOALS:  Patient stated goal: To be able to walk again without pain and not need the walker  [] Progressing: [] Met: [] Not Met: [] Adjusted        Therapist goals for Patient:  Short Term Goals: To be achieved in: 2 weeks  1. Independent in HEP and progression per patient tolerance, in order to prevent re-injury. [] Progressing: [] Met: [] Not Met: [] Adjusted  2. Patient will have a decrease in pain to facilitate improvement in movement, function, and ADLs as indicated by Functional Deficits. [] Progressing: [] Met: [] Not Met: [] Adjusted        Long Term Goals: To be achieved in: 6 weeks  1. Disability index score of 44 or Greater per FOTO to assist with reaching prior level of function. [] Progressing: [] Met: [] Not Met: [] Adjusted  2. Patient will demonstrate increased AROM to WNL, good LS mobility, good hip ROM to allow for proper joint functioning as indicated by patients Functional Deficits. [] Progressing: [] Met: [] Not Met: [] Adjusted  3. Patient will demonstrate an increase in Strength to good proximal hip and core activation to allow for proper functional mobility as indicated by patients Functional Deficits. [] Progressing: [] Met: [] Not Met: [] Adjusted  4. Patient will return to walking and moving around his house without increased symptoms or restriction. [] Progressing: [] Met: [] Not Met: [] Adjusted  5. Pt to return to (patient specific functional goal)    [] Progressing: [] Met: [] Not Met: [] Adjusted                  Overall Progression Towards Functional goals/ Treatment Progress Update:  [] Patient is progressing as expected towards functional goals listed. [] Progression is slowed due to complexities/Impairments listed. [] Progression has been slowed due to co-morbidities.   [x] Plan just implemented, too soon to assess goals progression <30days   [] Goals require adjustment due to lack of progress  [] Patient is not progressing as expected and requires additional follow up with physician  [] Other    Prognosis for POC: [x] Good [] Fair  [] Poor    Patient requires continued skilled intervention: [x] Yes  [] No    Treatment/Activity Tolerance:  [x] Patient able to complete treatment  [] Patient limited by fatigue  [] Patient limited by pain    [] Patient limited by other medical complications  [] Other:         PLAN: See eval  [] Continue per plan of care [] Alter current plan (see comments above)  [x] Plan of care initiated [] Hold pending MD visit [] Discharge        Therapeutic Exercise and NMR EXR  [] (84850) Provided verbal/tactile cueing for activities related to strengthening, flexibility, endurance, ROM  for improvements in proximal strength and core control with self care, mobility, lifting and ambulation.  [] (66232) Provided verbal/tactile cueing for activities related to improving balance, coordination, kinesthetic sense, posture, motor skill, proprioception  to assist with core control in self care, mobility, lifting, and ambulation.      Therapeutic Activities and Gait:    [] (04379 or 20893) Provided verbal/tactile cueing for activities related to improving balance, coordination, kinesthetic sense, posture, motor skill, proprioception and motor activation to allow for proper function  with self care and ADLs  [] (31717) Provided training and instruction to the patient for proper core and proximal hip recruitment and positioning with ambulation re-education     Home Exercise Program:    [x] (98002) Reviewed/Progressed HEP activities related to strengthening, flexibility, endurance, ROM of core, proximal hip and LE for functional self-care, mobility, lifting and ambulation   [] (73427) Reviewed/Progressed HEP activities related to improving balance, coordination, kinesthetic sense, posture, motor skill, proprioception of core, proximal hip and LE for self care, mobility, lifting, and ambulation      Manual Treatments:  PROM / STM / Oscillations-Mobs:  G-I, II, III, IV (PA's, Inf., Post.)  [] (71906) Provided manual therapy to mobilize proximal hip and LS spine soft tissue/joints for the purpose of modulating pain, promoting relaxation,  increasing ROM, reducing/eliminating soft tissue swelling/inflammation/restriction, improving soft tissue extensibility and allowing for proper ROM for normal function with self care, mobility, lifting and ambulation. []CRP:  Canalith Repositioning procedure for the assessment, treatment and education of BPPV    Modalities:       Charges:  Timed Code Treatment Minutes: 39   Total Treatment Minutes: 39     Medicare Cap total YTD:        [x]N/A  Workers Comp Time Stamp  (Per CPT and Total Treatment) [x]N/A   Time In:   Time Out:       [] EVAL    [] Dry Needling  [x] UC(61795)   x  1   [] EStim Unattended 39648  [] NMR (37049)  x     [] Estim Attended  21663  [x] Manual (96648)  x  2   [] Mechanical Txn 76336  [] TA    x     [] Ultrasound  [] Gait   x  [] Vaso  [] CRP    [] Ionto           [] Other:        Electronically signed by: Trisha Acevedo, PT,MPT, OMT-c [id number]      Note: If patient does not return for scheduled/ recommended follow up visits, this note will serve as a discharge from care along with most recent update on progress.

## 2022-07-26 NOTE — TELEPHONE ENCOUNTER
Spoke to him about therapy, he is required by insurance to have 6 treatment for MRI auth. He is going to continue his therapy for 4 more weeks.

## 2022-07-26 NOTE — TELEPHONE ENCOUNTER
Patient states his physical therapy is stopped because he cannot tolerate the pain.      Please call patient to advise on next step

## 2022-07-27 ENCOUNTER — HOSPITAL ENCOUNTER (OUTPATIENT)
Dept: CARDIAC REHAB | Age: 62
Setting detail: THERAPIES SERIES
Discharge: HOME | End: 2022-07-27
Payer: COMMERCIAL

## 2022-07-27 PROCEDURE — 93798 PHYS/QHP OP CAR RHAB W/ECG: CPT

## 2022-08-01 ENCOUNTER — HOSPITAL ENCOUNTER (OUTPATIENT)
Dept: CARDIAC REHAB | Age: 62
Setting detail: THERAPIES SERIES
Discharge: HOME | End: 2022-08-01
Payer: COMMERCIAL

## 2022-08-01 PROCEDURE — 93798 PHYS/QHP OP CAR RHAB W/ECG: CPT

## 2022-08-02 ENCOUNTER — HOSPITAL ENCOUNTER (OUTPATIENT)
Dept: PHYSICAL THERAPY | Age: 62
Setting detail: THERAPIES SERIES
Discharge: HOME | End: 2022-08-02
Payer: COMMERCIAL

## 2022-08-02 PROCEDURE — 97140 MANUAL THERAPY 1/> REGIONS: CPT

## 2022-08-02 PROCEDURE — 97110 THERAPEUTIC EXERCISES: CPT

## 2022-08-02 NOTE — FLOWSHEET NOTE
Physical Therapy Daily Treatment Note    [x]Daily Tx Note    []Progress Note    [] Discharge Summary           Date:  2022    Patient Name:  Noe Matson    :  1960  MRN: [id number]      Medical/Treatment Diagnosis Information:  Diagnosis: DDD, Lumbar M51.36   Pelvic alignment syndrome with sacral component    Insurance/Certification information:   [facility]     Physician Information:  Referring Provider (secondary): Khalida Mohr      Plan of care sent to provider:      []Faxed   []Co-signature    (attempts: 1[x] 22 2 []3[])     Plan of care signed :  []  Yes  [x] No      Date of Patient follow up with Physician:     Is this a Progress Report:     []  Yes  [x]  No      If Yes:  Date Range for reporting period:  Beginning  2022  Ending    Progress report will be due (10 Rx or 30 days whichever is less):        Recertification will be due (POC Duration  / 90 days whichever is less): 10/19/22      Visit # Insurance Allowable Auth Required   3/12 30 []  Yes []  No        Functional Scale:  FOTO    Date    2022  Score   29  Predicted Score: 44  Predicted Visits:  11    Latex Allergy:  [x]NO      []YES  Preferred Language for Healthcare:   [x]English       []other:    RESTRICTIONS/PRECAUTIONS:      SUBJECTIVE:    States that he is sore today from walking a lot yesterday - [place] [facility] [place] Having trouble sleeping due to pain. Pt is not doing HEP exercises due to pain. Pain continues to refer into R hip, does not extend to knee region. Pain level:  6/10 at onset of tx today, 6/10 at end of session today  Eval  8/10      Plan Moving Forward/ For next visit:    Reassess alignment    Manual tx    Progress HEP      OBJECTIVE:      Exercises/Interventions:     Exercises in bold performed in department today. Items not bolded are carried forward from prior visits for continuity of the record.   Exercise/Equipment Resistance/Repetitions HEP Other comments     NuStep NV []      Sit to stand with GS  Modified to standing GS X10  X10 with 5 sec hold [x] VC's for technique and muscle activation -terminated 2/2 pain throughout entire movement     GS  x5 [x]      Bridge x5 [x] terminated 2/2 pain     Supine knee rocks x10  terminated 2/2 pain     Sidelying clamshells B 2x10 [x]      Seated HS stretch 3 x 30 sec [x]        []        []        []          []         []        []        []        []        []        []        []        []      Therapeutic Exercise/Home Exercise Program:   30 minutes  Pt education provided regarding anatomy and physiology associated with dx, etiology of Symptoms and plan of care. All questions answered to pt satisfaction. Review of complaints of lack of progress and exacerbation of pain. Pt advised to contact MD 2/2 poor tolerance to therapy sessions. Pt unable to tolerate therex or manual techniques in supine - modified to sidelying. Pt demonstrated understanding of exercises in a pain-free range. Pt reported feeling comfortable performing these exercises at home. Therapeutic Activity:  0 minutes     Gait: 0 minutes    Neuromuscular Re-Education:  0 minutes      Canalith Repositioning Procedure:      Manual Therapy:  15 minutes  R LE short in supine. Unable to perform sit up test.     Sidelying \"breaking bread\" on L and R  R and L sacral mobs in sidelying (grades II-III) - unable to tolerate > mobs 2/2 pain     Modalities: 0 minutes      ASSESSMENT:    Pt continues to c/o high pain levels and has been non-compliant with HEP. PT modified HEP today and pt demonstrated understanding of exercises in a pain-free range. Pt reported feeling comfortable performing these exercises at home. Able to tolerate gentle manual techniques today with no inc in pain, however no dec in pain at end of session either. Cont with PT per POC with progression of flexibility and stabilization therex.       GOALS:  Patient stated goal:  To be able to walk again without pain and not need the walker  [] Progressing: [] Met: [] Not Met: [] Adjusted        Therapist goals for Patient:  Short Term Goals: To be achieved in: 2 weeks  1. Independent in HEP and progression per patient tolerance, in order to prevent re-injury. [] Progressing: [] Met: [] Not Met: [] Adjusted  2. Patient will have a decrease in pain to facilitate improvement in movement, function, and ADLs as indicated by Functional Deficits. [] Progressing: [] Met: [] Not Met: [] Adjusted        Long Term Goals: To be achieved in: 6 weeks  1. Disability index score of 44 or Greater per FOTO to assist with reaching prior level of function. [] Progressing: [] Met: [] Not Met: [] Adjusted  2. Patient will demonstrate increased AROM to WNL, good LS mobility, good hip ROM to allow for proper joint functioning as indicated by patients Functional Deficits. [] Progressing: [] Met: [] Not Met: [] Adjusted  3. Patient will demonstrate an increase in Strength to good proximal hip and core activation to allow for proper functional mobility as indicated by patients Functional Deficits. [] Progressing: [] Met: [] Not Met: [] Adjusted  4. Patient will return to walking and moving around his house without increased symptoms or restriction. [] Progressing: [] Met: [] Not Met: [] Adjusted  5. Pt to return to (patient specific functional goal)    [] Progressing: [] Met: [] Not Met: [] Adjusted                  Overall Progression Towards Functional goals/ Treatment Progress Update:  [] Patient is progressing as expected towards functional goals listed. [] Progression is slowed due to complexities/Impairments listed. [] Progression has been slowed due to co-morbidities.   [x] Plan just implemented, too soon to assess goals progression <30days   [] Goals require adjustment due to lack of progress  [] Patient is not progressing as expected and requires additional follow up with physician  [] Other    Prognosis for POC: [x] to mobilize proximal hip and LS spine soft tissue/joints for the purpose of modulating pain, promoting relaxation,  increasing ROM, reducing/eliminating soft tissue swelling/inflammation/restriction, improving soft tissue extensibility and allowing for proper ROM for normal function with self care, mobility, lifting and ambulation. []CRP:  Canalith Repositioning procedure for the assessment, treatment and education of BPPV    Modalities:       Charges:  Timed Code Treatment Minutes: 45   Total Treatment Minutes: 45     Medicare Cap total YTD:        [x]N/A  Workers Comp Time Stamp  (Per CPT and Total Treatment) [x]N/A   Time In:   Time Out:       [] EVAL    [] Dry Needling  [x] EW(35167)   x  2   [] EStim Unattended 08289  [] NMR (65802)  x     [] Estim Attended  72389  [x] Manual (86313)  x  1  [] Mechanical Txn 42175  [] TA    x     [] Ultrasound  [] Gait   x  [] Vaso  [] CRP    [] Ionto           [] Other:        Electronically signed by: Yola Ford, PT, DPT, OMT-C  #[id number]        Note: If patient does not return for scheduled/ recommended follow up visits, this note will serve as a discharge from care along with most recent update on progress.

## 2022-08-03 ENCOUNTER — HOSPITAL ENCOUNTER (OUTPATIENT)
Dept: CARDIAC REHAB | Age: 62
Setting detail: THERAPIES SERIES
Discharge: HOME | End: 2022-08-03
Payer: COMMERCIAL

## 2022-08-03 DIAGNOSIS — R05.3 CHRONIC COUGH: ICD-10-CM

## 2022-08-03 PROCEDURE — 93798 PHYS/QHP OP CAR RHAB W/ECG: CPT

## 2022-08-03 RX ORDER — BENZONATATE 200 MG/1
CAPSULE ORAL
Qty: 30 CAPSULE | Refills: 1 | Status: SHIPPED | OUTPATIENT
Start: 2022-08-03

## 2022-08-03 RX ORDER — LANSOPRAZOLE 30 MG/1
CAPSULE, DELAYED RELEASE ORAL
Qty: 30 CAPSULE | Refills: 1 | Status: SHIPPED | OUTPATIENT
Start: 2022-08-03

## 2022-08-04 ENCOUNTER — HOSPITAL ENCOUNTER (OUTPATIENT)
Dept: PHYSICAL THERAPY | Age: 62
Setting detail: THERAPIES SERIES
Discharge: HOME | End: 2022-08-04
Payer: COMMERCIAL

## 2022-08-04 PROCEDURE — 97110 THERAPEUTIC EXERCISES: CPT

## 2022-08-04 NOTE — FLOWSHEET NOTE
Physical Therapy Daily Treatment Note    [x]Daily Tx Note    []Progress Note    [] Discharge Summary           Date:  2022    Patient Name:  Anil Nassar    :  1960  MRN: [id number]      Medical/Treatment Diagnosis Information:  Diagnosis: DDD, Lumbar M51.36   Pelvic alignment syndrome with sacral component    Insurance/Certification information:   [facility]     Physician Information:  Referring Provider (secondary): Bahman Gutierrez      Plan of care sent to provider:      []Faxed   []Co-signature    (attempts: 1[x] 22 2 []3[])     Plan of care signed :  []  Yes  [x] No      Date of Patient follow up with Physician:     Is this a Progress Report:     []  Yes  [x]  No      If Yes:  Date Range for reporting period:  Beginning  2022  Ending    Progress report will be due (10 Rx or 30 days whichever is less):    3/23/39    Recertification will be due (POC Duration  / 90 days whichever is less): 10/19/22      Visit # Insurance Allowable Auth Required    30 []  Yes []  No        Functional Scale:  FOTO    Date    2022  Score   29  Predicted Score: 44  Predicted Visits:  11    Functional Scale:  FOTO    Date    2022  Score   40  Predicted Score: 44  Predicted Visits:  11    Latex Allergy:  [x]NO      []YES  Preferred Language for Healthcare:   [x]English       []other:    RESTRICTIONS/PRECAUTIONS:      SUBJECTIVE:    States he did HEP and it was easy. No inc soreness or pain after last PT visit. Reports he is going to try to ride his motorcycle in the [facility] but is concerned that his back pain will get worse. Pain level:  6/10 at onset of tx today, 6/10 at end of session today  Eval  8/10      Plan Moving Forward/ For next visit:    Reassess alignment    Manual tx    Progress HEP      OBJECTIVE:      Exercises/Interventions:     Exercises in bold performed in department today.   Items not bolded are carried forward from prior visits for continuity of the record. Exercise/Equipment Resistance/Repetitions HEP Other comments     NuStep Unable to perform 2/2 pain and discomfort []      Sit to stand with GS  Modified to standing GS X10  X10 with 5 sec hold [x] VC's for technique and muscle activation -terminated 2/2 pain throughout entire movement     GS  x5 [x]      Bridge x5 [] terminated 2/2 pain     Supine knee rocks x10  terminated 2/2 pain     Sidelying clamshells B 2x10 [x]      Seated HS stretch 3 x 30 sec [x]      Sidelying hip abd 2x10 []      Standing hip ext in // bars 2x10 [] Pain in back when performing ex with RLE      Standing mini squats 2x10 []      Seated marching 1x10 []         []        []        []        []        []        []        []        []      Therapeutic Exercise/Home Exercise Program:   40 minutes  Pt education provided regarding anatomy and physiology associated with dx, etiology of Symptoms and plan of care. All questions answered to pt satisfaction. Review of complaints of lack of progress and exacerbation of pain. Pt advised to contact MD 2/2 poor tolerance to therapy sessions. Pt unable to tolerate therex or manual techniques in supine - modified to sidelying. Pt demonstrated understanding of exercises in a pain-free range. Pt reported feeling comfortable performing these exercises at home. Therapeutic Activity:  0 minutes     Gait: 0 minutes    Neuromuscular Re-Education:  0 minutes      Canalith Repositioning Procedure:      Manual Therapy:  0 minutes  Pt unable to tolerate manual therapy today 2/2 reports of pain       Sidelying \"breaking bread\" on L and R  R and L sacral mobs in sidelying (grades II-III) - unable to tolerate > mobs 2/2 pain     Modalities: 0 minutes      ASSESSMENT:  Tx continues to be limited 2/2 pt's high pain levels.  Pt unable to tolerate many functional positions for ex and c/o pain with all movement and ex, regardless of closed chain vs open chain, isometric vs isotonic, seated vs supine vs standing. Pt only able to tolerate standing <5 min before needing to sit 2/2 pain. Unable to tolerate NuStep. Encouraged pt to continue HEP within a pain-free range at home. Will reassess response next visit. GOALS:  Patient stated goal:  To be able to walk again without pain and not need the walker  [] Progressing: [] Met: [] Not Met: [] Adjusted        Therapist goals for Patient:  Short Term Goals: To be achieved in: 2 weeks  1. Independent in HEP and progression per patient tolerance, in order to prevent re-injury. [] Progressing: [] Met: [] Not Met: [] Adjusted  2. Patient will have a decrease in pain to facilitate improvement in movement, function, and ADLs as indicated by Functional Deficits. [] Progressing: [] Met: [] Not Met: [] Adjusted        Long Term Goals: To be achieved in: 6 weeks  1. Disability index score of 44 or Greater per FOTO to assist with reaching prior level of function. [] Progressing: [] Met: [] Not Met: [] Adjusted  2. Patient will demonstrate increased AROM to WNL, good LS mobility, good hip ROM to allow for proper joint functioning as indicated by patients Functional Deficits. [] Progressing: [] Met: [] Not Met: [] Adjusted  3. Patient will demonstrate an increase in Strength to good proximal hip and core activation to allow for proper functional mobility as indicated by patients Functional Deficits. [] Progressing: [] Met: [] Not Met: [] Adjusted  4. Patient will return to walking and moving around his house without increased symptoms or restriction. [] Progressing: [] Met: [] Not Met: [] Adjusted  5. Pt to return to (patient specific functional goal)    [] Progressing: [] Met: [] Not Met: [] Adjusted                  Overall Progression Towards Functional goals/ Treatment Progress Update:  [] Patient is progressing as expected towards functional goals listed. [] Progression is slowed due to complexities/Impairments listed.   [] Progression has been slowed due to co-morbidities. [x] Plan just implemented, too soon to assess goals progression <30days   [] Goals require adjustment due to lack of progress  [] Patient is not progressing as expected and requires additional follow up with physician  [] Other    Prognosis for POC: [x] Good [] Fair  [] Poor    Patient requires continued skilled intervention: [x] Yes  [] No    Treatment/Activity Tolerance:  [x] Patient able to complete treatment  [] Patient limited by fatigue  [] Patient limited by pain    [] Patient limited by other medical complications  [] Other:         PLAN: See eval  [] Continue per plan of care [] Alter current plan (see comments above)  [x] Plan of care initiated [] Hold pending MD visit [] Discharge        Therapeutic Exercise and NMR EXR  [x] (68341) Provided verbal/tactile cueing for activities related to strengthening, flexibility, endurance, ROM  for improvements in proximal strength and core control with self care, mobility, lifting and ambulation.  [] (15533) Provided verbal/tactile cueing for activities related to improving balance, coordination, kinesthetic sense, posture, motor skill, proprioception  to assist with core control in self care, mobility, lifting, and ambulation.      Therapeutic Activities and Gait:    [] (19411 or 10911) Provided verbal/tactile cueing for activities related to improving balance, coordination, kinesthetic sense, posture, motor skill, proprioception and motor activation to allow for proper function  with self care and ADLs  [] (11767) Provided training and instruction to the patient for proper core and proximal hip recruitment and positioning with ambulation re-education     Home Exercise Program:    [x] (12961) Reviewed/Progressed HEP activities related to strengthening, flexibility, endurance, ROM of core, proximal hip and LE for functional self-care, mobility, lifting and ambulation   [] (48027) Reviewed/Progressed HEP activities related to improving balance, coordination, kinesthetic sense, posture, motor skill, proprioception of core, proximal hip and LE for self care, mobility, lifting, and ambulation      Manual Treatments:  PROM / STM / Oscillations-Mobs:  G-I, II, III, IV (PA's, Inf., Post.)  [] (34430) Provided manual therapy to mobilize proximal hip and LS spine soft tissue/joints for the purpose of modulating pain, promoting relaxation,  increasing ROM, reducing/eliminating soft tissue swelling/inflammation/restriction, improving soft tissue extensibility and allowing for proper ROM for normal function with self care, mobility, lifting and ambulation. []CRP:  Canalith Repositioning procedure for the assessment, treatment and education of BPPV    Modalities:       Charges:  Timed Code Treatment Minutes: 40   Total Treatment Minutes: 40     Medicare Cap total YTD:        [x]N/A  Workers Comp Time Stamp  (Per CPT and Total Treatment) [x]N/A   Time In:   Time Out:       [] EVAL    [] Dry Needling  [x] GY(55658)   x  3   [] EStim Unattended 39366  [] NMR (28478)  x     [] Estim Attended  36732  [x] Manual (02111)  x    [] Mechanical Txn 80479  [] TA    x     [] Ultrasound  [] Gait   x  [] Vaso  [] CRP    [] Ionto           [] Other:        Electronically signed by: Raquel Saldana, PT, DPT, OMT-C  #[id number]        Note: If patient does not return for scheduled/ recommended follow up visits, this note will serve as a discharge from care along with most recent update on progress.

## 2022-08-05 ENCOUNTER — HOSPITAL ENCOUNTER (OUTPATIENT)
Dept: CARDIAC REHAB | Age: 62
Setting detail: THERAPIES SERIES
Discharge: HOME | End: 2022-08-05
Payer: COMMERCIAL

## 2022-08-05 PROCEDURE — 93798 PHYS/QHP OP CAR RHAB W/ECG: CPT

## 2022-08-08 ENCOUNTER — HOSPITAL ENCOUNTER (OUTPATIENT)
Dept: CARDIAC REHAB | Age: 62
Setting detail: THERAPIES SERIES
Discharge: HOME | End: 2022-08-08
Payer: COMMERCIAL

## 2022-08-08 PROCEDURE — 93798 PHYS/QHP OP CAR RHAB W/ECG: CPT

## 2022-08-09 ENCOUNTER — HOSPITAL ENCOUNTER (OUTPATIENT)
Dept: PHYSICAL THERAPY | Age: 62
Setting detail: THERAPIES SERIES
Discharge: HOME | End: 2022-08-09
Payer: COMMERCIAL

## 2022-08-09 PROCEDURE — 97110 THERAPEUTIC EXERCISES: CPT

## 2022-08-09 PROCEDURE — 97140 MANUAL THERAPY 1/> REGIONS: CPT

## 2022-08-09 NOTE — FLOWSHEET NOTE
Physical Therapy Daily Treatment Note    [x]Daily Tx Note    []Progress Note    [] Discharge Summary           Date:  2022    Patient Name:  Maxx Faustin    :  1960  MRN: [id number]      Medical/Treatment Diagnosis Information:  Diagnosis: DDD, Lumbar M51.36   Pelvic alignment syndrome with sacral component    Insurance/Certification information:   [facility]     Physician Information:  Referring Provider (secondary): Sukumar Mendosa      Plan of care sent to provider:      []Faxed   []Co-signature    (attempts: 1[x] 22 2 []3[])     Plan of care signed :  []  Yes  [x] No      Date of Patient follow up with Physician:     Is this a Progress Report:     []  Yes  [x]  No      If Yes:  Date Range for reporting period:  Beginning  2022  Ending    Progress report will be due (10 Rx or 30 days whichever is less):        Recertification will be due (POC Duration  / 90 days whichever is less): 10/19/22      Visit # Insurance Allowable Auth Required    30 []  Yes []  No        Functional Scale:  FOTO    Date    2022  Score   29  Predicted Score: 44  Predicted Visits:  11    Functional Scale:  FOTO    Date    2022  Score   40  Predicted Score: 44  Predicted Visits:  11    Latex Allergy:  [x]NO      []YES  Preferred Language for Healthcare:   [x]English       []other:    RESTRICTIONS/PRECAUTIONS:      SUBJECTIVE:    Pt continues to report no benefit from therapy. Feels that the therapy is actually making him worse. Worse today than he has been over the last couple of days. Laid in bed all weekend due to pain. Tried to ride his motorcycle but could not tolerate. Doing his HEP and it was ok. Is required to continue therapy for 6 weeks before he can get an MRI or injection. That will be Aug 23. If he moves certain ways, it hurts the back even more. If he bends too much. Standing more than a few minutes. Makes his legs tingle all the way down to his feet.        Pain level:  6/10 at onset of tx today, 6/10 at end of session today  Eval  8/10      Plan Moving Forward/ For next visit:    Reassess alignment    Manual tx    Progress HEP      OBJECTIVE:      Exercises/Interventions:     Exercises in bold performed in department today. Items not bolded are carried forward from prior visits for continuity of the record. Exercise/Equipment Resistance/Repetitions HEP Other comments     NuStep Unable to perform 2/2 pain and discomfort []      Sit to stand with GS  Modified to standing GS X10  X10 with 5 sec hold [x] VC's for technique and muscle activation -terminated 2/2 pain throughout entire movement     GS  5 sec hold x 10  [x]      TA/ Pelvic Tilt 5 sec hold x 10       Bridge x5 [] terminated 2/2 pain     Supine knee rocks x10  terminated 2/2 pain     Sidelying clamshells B 2x10 [x]      Seated HS stretch 3 x 30 sec [x]      Sidelying hip abd 2x10 []      Standing hip ext in // bars 2x10 [] Pain in back when performing ex with RLE      Standing mini squats 2x10 []      Seated marching 1x10 []         []        []        []        []        []        []        []        []      Therapeutic Exercise/Home Exercise Program:   25 minutes  Pt education provided regarding anatomy and physiology associated with dx, etiology of Symptoms and plan of care. All questions answered to pt satisfaction. Review of complaints of lack of progress and exacerbation of pain. Pt advised to contact MD 2/2 poor tolerance to therapy sessions. Pt unable to tolerate therex or manual techniques in supine - modified to sidelying. Pt demonstrated understanding of exercises in a pain-free range. Pt reported feeling comfortable performing these exercises at home.        Therapeutic Activity:  0 minutes     Gait: 0 minutes    Neuromuscular Re-Education:  0 minutes      Canalith Repositioning Procedure:      Manual Therapy:  0 minutes  Pt unable to tolerate manual therapy today 2/2 reports of pain Modalities: 15 minutes  Trial of mechanical traction 75 kg pull, 43kg release ; 15 min tx session  Pt attended to for entire course of tx. Conversational during course of tx. No significant change in pain reported with treatment and denies rebound sensation at end of tx. ASSESSMENT:  Pt cont to report severe pain with no improvement. Had a very difficult weekend. Trial of Mechanical txn initiated this date. Very good tolerance to treatment noted, however denies change in status at end of visit. Plan to continue with Mechanical txn trial for 1-4 more session, pending review of tolerance next visit. Cont per POC. GOALS:  Patient stated goal:  To be able to walk again without pain and not need the walker  [] Progressing: [] Met: [] Not Met: [] Adjusted        Therapist goals for Patient:  Short Term Goals: To be achieved in: 2 weeks  1. Independent in HEP and progression per patient tolerance, in order to prevent re-injury. [] Progressing: [] Met: [] Not Met: [] Adjusted  2. Patient will have a decrease in pain to facilitate improvement in movement, function, and ADLs as indicated by Functional Deficits. [] Progressing: [] Met: [] Not Met: [] Adjusted        Long Term Goals: To be achieved in: 6 weeks  1. Disability index score of 44 or Greater per FOTO to assist with reaching prior level of function. [] Progressing: [] Met: [] Not Met: [] Adjusted  2. Patient will demonstrate increased AROM to WNL, good LS mobility, good hip ROM to allow for proper joint functioning as indicated by patients Functional Deficits. [] Progressing: [] Met: [] Not Met: [] Adjusted  3. Patient will demonstrate an increase in Strength to good proximal hip and core activation to allow for proper functional mobility as indicated by patients Functional Deficits. [] Progressing: [] Met: [] Not Met: [] Adjusted  4.  Patient will return to walking and moving around his house without increased symptoms or restriction. [] Progressing: [] Met: [] Not Met: [] Adjusted  5. Pt to return to (patient specific functional goal)    [] Progressing: [] Met: [] Not Met: [] Adjusted                  Overall Progression Towards Functional goals/ Treatment Progress Update:  [] Patient is progressing as expected towards functional goals listed. [] Progression is slowed due to complexities/Impairments listed. [] Progression has been slowed due to co-morbidities. [x] Plan just implemented, too soon to assess goals progression <30days   [] Goals require adjustment due to lack of progress  [] Patient is not progressing as expected and requires additional follow up with physician  [] Other    Prognosis for POC: [x] Good [] Fair  [] Poor    Patient requires continued skilled intervention: [x] Yes  [] No    Treatment/Activity Tolerance:  [x] Patient able to complete treatment  [] Patient limited by fatigue  [] Patient limited by pain    [] Patient limited by other medical complications  [] Other:         PLAN: See eval  [x] Continue per plan of care [] Alter current plan (see comments above)  [] Plan of care initiated [] Hold pending MD visit [] Discharge        Therapeutic Exercise and NMR EXR  [x] (69914) Provided verbal/tactile cueing for activities related to strengthening, flexibility, endurance, ROM  for improvements in proximal strength and core control with self care, mobility, lifting and ambulation.  [] (39223) Provided verbal/tactile cueing for activities related to improving balance, coordination, kinesthetic sense, posture, motor skill, proprioception  to assist with core control in self care, mobility, lifting, and ambulation.      Therapeutic Activities and Gait:    [] (31124 or 50214) Provided verbal/tactile cueing for activities related to improving balance, coordination, kinesthetic sense, posture, motor skill, proprioception and motor activation to allow for proper function  with self care and ADLs  [] (09328) Provided training and instruction to the patient for proper core and proximal hip recruitment and positioning with ambulation re-education     Home Exercise Program:    [x] (13166) Reviewed/Progressed HEP activities related to strengthening, flexibility, endurance, ROM of core, proximal hip and LE for functional self-care, mobility, lifting and ambulation   [] (93365) Reviewed/Progressed HEP activities related to improving balance, coordination, kinesthetic sense, posture, motor skill, proprioception of core, proximal hip and LE for self care, mobility, lifting, and ambulation      Manual Treatments:  PROM / STM / Oscillations-Mobs:  G-I, II, III, IV (PA's, Inf., Post.)  [] (72015) Provided manual therapy to mobilize proximal hip and LS spine soft tissue/joints for the purpose of modulating pain, promoting relaxation,  increasing ROM, reducing/eliminating soft tissue swelling/inflammation/restriction, improving soft tissue extensibility and allowing for proper ROM for normal function with self care, mobility, lifting and ambulation. []CRP:  Canalith Repositioning procedure for the assessment, treatment and education of BPPV    Modalities:       Charges:  Timed Code Treatment Minutes: 40   Total Treatment Minutes: 40     Medicare Cap total YTD:        [x]N/A  Workers Comp Time Stamp  (Per CPT and Total Treatment) [x]N/A   Time In:   Time Out:       [] EVAL    [] Dry Needling  [x] NO(91964)   x  2   [] EStim Unattended 12440  [] NMR (57232)  x     [] Estim Attended  26708  [] Manual (83295)  x    [x] Mechanical Txn 56273  [] TA    x     [] Ultrasound  [] Gait   x  [] Vaso  [] CRP    [] Ionto           [] Other:        Electronically signed by: Radha Torres, PT, DPT, OMT-C  #[id number]        Note: If patient does not return for scheduled/ recommended follow up visits, this note will serve as a discharge from care along with most recent update on progress.

## 2022-08-10 ENCOUNTER — APPOINTMENT (OUTPATIENT)
Dept: CARDIAC REHAB | Age: 62
End: 2022-08-10
Payer: COMMERCIAL

## 2022-08-11 ENCOUNTER — HOSPITAL ENCOUNTER (OUTPATIENT)
Dept: PHYSICAL THERAPY | Age: 62
Setting detail: THERAPIES SERIES
Discharge: HOME | End: 2022-08-11
Payer: COMMERCIAL

## 2022-08-11 PROCEDURE — 97012 MECHANICAL TRACTION THERAPY: CPT

## 2022-08-11 PROCEDURE — 97110 THERAPEUTIC EXERCISES: CPT

## 2022-08-11 NOTE — FLOWSHEET NOTE
Physical Therapy Daily Treatment Note    [x]Daily Tx Note    []Progress Note    [] Discharge Summary           Date:  2022    Patient Name:  Jayde Marcum    :  1960  MRN: [id number]      Medical/Treatment Diagnosis Information:  Diagnosis: DDD, Lumbar M51.36   Pelvic alignment syndrome with sacral component    Insurance/Certification information:   [facility]     Physician Information:  Referring Provider (secondary): Renae Ronquillo      Plan of care sent to provider:      []Faxed   []Co-signature    (attempts: 1[x] 22 2 []3[])     Plan of care signed :  []  Yes  [x] No      Date of Patient follow up with Physician:     Is this a Progress Report:     []  Yes  [x]  No      If Yes:  Date Range for reporting period:  Beginning  2022  Ending    Progress report will be due (10 Rx or 30 days whichever is less):        Recertification will be due (POC Duration  / 90 days whichever is less): 10/19/22      Visit # Insurance Allowable Auth Required    30 []  Yes []  No        Functional Scale:  FOTO    Date    2022  Score   29  Predicted Score: 44  Predicted Visits:  11    Functional Scale:  FOTO    Date    2022  Score   40  Predicted Score: 44  Predicted Visits:  11    Latex Allergy:  [x]NO      []YES  Preferred Language for Healthcare:   [x]English       []other:    RESTRICTIONS/PRECAUTIONS:      SUBJECTIVE:    Pt reports that the traction treatment did help a little bit and that he thinks he felt a little better after his last visit. He is in pain today. Did ride his motorcycle a little bit yesterday and wonders if that is why he is hurting. Doing his HEP and it was ok. Is required to continue therapy for 6 weeks before he can get an MRI or injection. That will be Aug 23.         Pain level:  6/10 at onset of tx today, 6/10 at end of session today  Eval  8/10      Plan Moving Forward/ For next visit:    Reassess alignment    Manual tx    Progress HEP      OBJECTIVE:      Exercises/Interventions:     Exercises in bold performed in department today. Items not bolded are carried forward from prior visits for continuity of the record. Exercise/Equipment Resistance/Repetitions HEP Other comments   []      Sit to stand with GS  Modified to standing GS X10  X10 with 5 sec hold [x] VC's for technique and muscle activation -terminated 2/2 pain throughout entire movement     GS  5 sec hold x 10  [x]      TA/ Pelvic Tilt 5 sec hold x 10       Bridge x5 [] terminated 2/2 pain     Supine knee rocks x10  terminated 2/2 pain     Sidelying clamshells B 2x10 [x]      Seated HS stretch 3 x 30 sec [x]      Sidelying hip abd 2x10 []      Standing hip ext in // bars 2x10 [] Pain in back when performing ex with RLE      Standing mini squats 2x10 []      Seated marching 1x10 []         []        []        []        []        []        []        []        []      Therapeutic Exercise/Home Exercise Program:   15 minutes  Pt education provided regarding anatomy and physiology associated with dx, etiology of Symptoms and plan of care. All questions answered to pt satisfaction. Review of complaints of lack of progress and exacerbation of pain. Pt advised to contact MD 2/2 poor tolerance to therapy sessions. Pt unable to tolerate therex or manual techniques in supine - modified to sidelying. Pt demonstrated understanding of exercises in a pain-free range. Pt reported feeling comfortable performing these exercises at home. Therapeutic Activity:  0 minutes     Gait: 0 minutes    Neuromuscular Re-Education:  0 minutes      Canalith Repositioning Procedure:      Manual Therapy:  0 minutes  Pt unable to tolerate manual therapy today 2/2 reports of pain         Modalities: 20 minutes  Trial of mechanical traction  60 kg pull, 25kg release; Increased to 70kg pull mid session. Pt attended to for entire course of tx. Tx paused to adjust positioning on the table.   Comfort improved with this adjustment. Conversational during course of tx. No significant change in pain reported with treatment and denies rebound sensation at end of tx. ASSESSMENT:  Pt reports mild mprovement in symptoms with traction treatment. Plan to continue with Mechanical txn trial for 1-4 more session, pending review of tolerance next visit. Cont per POC. GOALS:  Patient stated goal:  To be able to walk again without pain and not need the walker  [] Progressing: [] Met: [] Not Met: [] Adjusted        Therapist goals for Patient:  Short Term Goals: To be achieved in: 2 weeks  1. Independent in HEP and progression per patient tolerance, in order to prevent re-injury. [] Progressing: [] Met: [] Not Met: [] Adjusted  2. Patient will have a decrease in pain to facilitate improvement in movement, function, and ADLs as indicated by Functional Deficits. [] Progressing: [] Met: [] Not Met: [] Adjusted        Long Term Goals: To be achieved in: 6 weeks  1. Disability index score of 44 or Greater per FOTO to assist with reaching prior level of function. [] Progressing: [] Met: [] Not Met: [] Adjusted  2. Patient will demonstrate increased AROM to WNL, good LS mobility, good hip ROM to allow for proper joint functioning as indicated by patients Functional Deficits. [] Progressing: [] Met: [] Not Met: [] Adjusted  3. Patient will demonstrate an increase in Strength to good proximal hip and core activation to allow for proper functional mobility as indicated by patients Functional Deficits. [] Progressing: [] Met: [] Not Met: [] Adjusted  4. Patient will return to walking and moving around his house without increased symptoms or restriction. [] Progressing: [] Met: [] Not Met: [] Adjusted  5.  Pt to return to (patient specific functional goal)    [] Progressing: [] Met: [] Not Met: [] Adjusted                  Overall Progression Towards Functional goals/ Treatment Progress Update:  [] Patient is progressing as expected towards functional goals listed. [] Progression is slowed due to complexities/Impairments listed. [] Progression has been slowed due to co-morbidities. [x] Plan just implemented, too soon to assess goals progression <30days   [] Goals require adjustment due to lack of progress  [] Patient is not progressing as expected and requires additional follow up with physician  [] Other    Prognosis for POC: [x] Good [] Fair  [] Poor    Patient requires continued skilled intervention: [x] Yes  [] No    Treatment/Activity Tolerance:  [x] Patient able to complete treatment  [] Patient limited by fatigue  [] Patient limited by pain    [] Patient limited by other medical complications  [] Other:         PLAN: See eval  [x] Continue per plan of care [] Alter current plan (see comments above)  [] Plan of care initiated [] Hold pending MD visit [] Discharge        Therapeutic Exercise and NMR EXR  [x] (69178) Provided verbal/tactile cueing for activities related to strengthening, flexibility, endurance, ROM  for improvements in proximal strength and core control with self care, mobility, lifting and ambulation.  [] (54046) Provided verbal/tactile cueing for activities related to improving balance, coordination, kinesthetic sense, posture, motor skill, proprioception  to assist with core control in self care, mobility, lifting, and ambulation.      Therapeutic Activities and Gait:    [] (94107 or 70198) Provided verbal/tactile cueing for activities related to improving balance, coordination, kinesthetic sense, posture, motor skill, proprioception and motor activation to allow for proper function  with self care and ADLs  [] (43606) Provided training and instruction to the patient for proper core and proximal hip recruitment and positioning with ambulation re-education     Home Exercise Program:    [x] (24892) Reviewed/Progressed HEP activities related to strengthening, flexibility, endurance, ROM of core, proximal hip and LE for functional self-care, mobility, lifting and ambulation   [] (08053) Reviewed/Progressed HEP activities related to improving balance, coordination, kinesthetic sense, posture, motor skill, proprioception of core, proximal hip and LE for self care, mobility, lifting, and ambulation      Manual Treatments:  PROM / STM / Oscillations-Mobs:  G-I, II, III, IV (PA's, Inf., Post.)  [] (46852) Provided manual therapy to mobilize proximal hip and LS spine soft tissue/joints for the purpose of modulating pain, promoting relaxation,  increasing ROM, reducing/eliminating soft tissue swelling/inflammation/restriction, improving soft tissue extensibility and allowing for proper ROM for normal function with self care, mobility, lifting and ambulation. []CRP:  Canalith Repositioning procedure for the assessment, treatment and education of BPPV    Modalities:       Charges:  Timed Code Treatment Minutes: 35   Total Treatment Minutes: 35     Medicare Cap total YTD:        [x]N/A  Workers Comp Time Stamp  (Per CPT and Total Treatment) [x]N/A   Time In:   Time Out:       [] EVAL    [] Dry Needling  [x] RZ(05531)   x  1   [] EStim Unattended 29891  [] NMR (49871)  x     [] Estim Attended  05360  [] Manual (43675)  x    [x] Mechanical Txn 96343  [] TA    x     [] Ultrasound  [] Gait   x  [] Vaso  [] CRP    [] Ionto           [] Other:        Electronically signed by: Jermaine Rodriguez PT, MPT, OMT-C #[id number]        Note: If patient does not return for scheduled/ recommended follow up visits, this note will serve as a discharge from care along with most recent update on progress.

## 2022-08-12 ENCOUNTER — APPOINTMENT (OUTPATIENT)
Dept: CARDIAC REHAB | Age: 62
End: 2022-08-12
Payer: COMMERCIAL

## 2022-08-15 ENCOUNTER — APPOINTMENT (OUTPATIENT)
Dept: CARDIAC REHAB | Age: 62
End: 2022-08-15
Payer: COMMERCIAL

## 2022-08-16 ENCOUNTER — HOSPITAL ENCOUNTER (OUTPATIENT)
Dept: PHYSICAL THERAPY | Age: 62
Setting detail: THERAPIES SERIES
Discharge: HOME | End: 2022-08-16
Payer: COMMERCIAL

## 2022-08-16 PROCEDURE — 97110 THERAPEUTIC EXERCISES: CPT

## 2022-08-16 PROCEDURE — 97012 MECHANICAL TRACTION THERAPY: CPT

## 2022-08-16 NOTE — FLOWSHEET NOTE
Physical Therapy Daily Treatment Note    [x]Daily Tx Note    []Progress Note    [] Discharge Summary           Date:  2022    Patient Name:  Wendy Brito    :  1960  MRN: [id number]      Medical/Treatment Diagnosis Information:  Diagnosis: DDD, Lumbar M51.36   Pelvic alignment syndrome with sacral component    Insurance/Certification information:   [facility]     Physician Information:  Referring Provider (secondary): Alysa Rea      Plan of care sent to provider:      []Faxed   []Co-signature    (attempts: 1[x] 22 2 []3[])     Plan of care signed :  []  Yes  [x] No      Date of Patient follow up with Physician:     Is this a Progress Report:     []  Yes  [x]  No      If Yes:  Date Range for reporting period:  Beginning  2022  Ending    Progress report will be due (10 Rx or 30 days whichever is less):        Recertification will be due (POC Duration  / 90 days whichever is less): 10/19/22      Visit # Insurance Allowable Auth Required      24 Units through  []  Yes []  No        Functional Scale:  FOTO    Date    2022  Score   29  Predicted Score: 44  Predicted Visits:  11    Functional Scale:  FOTO    Date    2022  Score   40  Predicted Score: 44  Predicted Visits:  11    Latex Allergy:  [x]NO      []YES  Preferred Language for Healthcare:   [x]English       []other:    RESTRICTIONS/PRECAUTIONS:      SUBJECTIVE:    Pt reports that the traction treatment did help a little bit but the relief is not lasting. He is in pain today. Did not ride his motorcycle this weekend due to pain  Doing his HEP and it was ok. Is required to continue therapy for 6 weeks before he can get an MRI or injection. That will be the week of Aug 23.         Pain level:  6/10 at onset of tx today, 6/10 at end of session today  Eval  8/10      Plan Moving Forward/ For next visit:    Reassess alignment    Manual tx    Progress HEP      OBJECTIVE:      Exercises/Interventions:     Exercises in bold performed in department today. Items not bolded are carried forward from prior visits for continuity of the record. Exercise/Equipment Resistance/Repetitions HEP Other comments   []      Sit to stand with GS  Modified to standing GS X10  X10 with 5 sec hold [x] VC's for technique and muscle activation -terminated 2/2 pain throughout entire movement     GS  5 sec hold x 10  [x]      TA/ Pelvic Tilt 5 sec hold x 10       Bridge x5 [] terminated 2/2 pain     Supine knee rocks x10  terminated 2/2 pain     Sidelying clamshells B 2x10 [x]      Seated HS stretch 3 x 30 sec [x]      Sidelying hip abd 2x10 []      Standing hip ext in // bars 2x10 [] Pain in back when performing ex with RLE      Standing mini squats 2x10 []      Seated marching 1x10 []         []        []        []        []        []        []        []        []      Therapeutic Exercise/Home Exercise Program:   15 minutes  Pt education provided regarding anatomy and physiology associated with dx, etiology of Symptoms and plan of care. All questions answered to pt satisfaction. Review of complaints of lack of progress and exacerbation of pain. Pt advised to contact MD 2/2 poor tolerance to therapy sessions. Pt unable to tolerate therex or manual techniques in supine - modified to sidelying. Pt demonstrated understanding of exercises in a pain-free range. Pt reported feeling comfortable performing these exercises at home. Therapeutic Activity:  0 minutes     Gait: 0 minutes    Neuromuscular Re-Education:  0 minutes      Canalith Repositioning Procedure:      Manual Therapy:  0 minutes  Pt unable to tolerate manual therapy today 2/2 reports of pain         Modalities: 15 minutes  Trial # 3 of mechanical traction  60 kg pull, 25kg release  Pt attended to for entire course of tx. Intended for 20 min tx however pt Requested to terminate tx after 15 minutes due to discomfort.        ASSESSMENT:  Pt reports mild mprovement in symptoms with traction treatment. Plan to continue with Mechanical txn trial for 1-4 more session, pending review of tolerance next visit. Cont per POC. GOALS:  Patient stated goal:  To be able to walk again without pain and not need the walker  [] Progressing: [] Met: [] Not Met: [] Adjusted        Therapist goals for Patient:  Short Term Goals: To be achieved in: 2 weeks  1. Independent in HEP and progression per patient tolerance, in order to prevent re-injury. [] Progressing: [] Met: [] Not Met: [] Adjusted  2. Patient will have a decrease in pain to facilitate improvement in movement, function, and ADLs as indicated by Functional Deficits. [] Progressing: [] Met: [] Not Met: [] Adjusted        Long Term Goals: To be achieved in: 6 weeks  1. Disability index score of 44 or Greater per FOTO to assist with reaching prior level of function. [] Progressing: [] Met: [] Not Met: [] Adjusted  2. Patient will demonstrate increased AROM to WNL, good LS mobility, good hip ROM to allow for proper joint functioning as indicated by patients Functional Deficits. [] Progressing: [] Met: [] Not Met: [] Adjusted  3. Patient will demonstrate an increase in Strength to good proximal hip and core activation to allow for proper functional mobility as indicated by patients Functional Deficits. [] Progressing: [] Met: [] Not Met: [] Adjusted  4. Patient will return to walking and moving around his house without increased symptoms or restriction. [] Progressing: [] Met: [] Not Met: [] Adjusted  5. Pt to return to (patient specific functional goal)    [] Progressing: [] Met: [] Not Met: [] Adjusted                  Overall Progression Towards Functional goals/ Treatment Progress Update:  [] Patient is progressing as expected towards functional goals listed. [] Progression is slowed due to complexities/Impairments listed. [] Progression has been slowed due to co-morbidities.   [x] Plan just implemented, too soon to assess goals progression <30days   [] Goals require adjustment due to lack of progress  [] Patient is not progressing as expected and requires additional follow up with physician  [] Other    Prognosis for POC: [x] Good [] Fair  [] Poor    Patient requires continued skilled intervention: [x] Yes  [] No    Treatment/Activity Tolerance:  [x] Patient able to complete treatment  [] Patient limited by fatigue  [] Patient limited by pain    [] Patient limited by other medical complications  [] Other:         PLAN: See eval  [x] Continue per plan of care [] Alter current plan (see comments above)  [] Plan of care initiated [] Hold pending MD visit [] Discharge        Therapeutic Exercise and NMR EXR  [x] (38223) Provided verbal/tactile cueing for activities related to strengthening, flexibility, endurance, ROM  for improvements in proximal strength and core control with self care, mobility, lifting and ambulation.  [] (54983) Provided verbal/tactile cueing for activities related to improving balance, coordination, kinesthetic sense, posture, motor skill, proprioception  to assist with core control in self care, mobility, lifting, and ambulation.      Therapeutic Activities and Gait:    [] (26564 or 55330) Provided verbal/tactile cueing for activities related to improving balance, coordination, kinesthetic sense, posture, motor skill, proprioception and motor activation to allow for proper function  with self care and ADLs  [] (22635) Provided training and instruction to the patient for proper core and proximal hip recruitment and positioning with ambulation re-education     Home Exercise Program:    [x] (21574) Reviewed/Progressed HEP activities related to strengthening, flexibility, endurance, ROM of core, proximal hip and LE for functional self-care, mobility, lifting and ambulation   [] (38328) Reviewed/Progressed HEP activities related to improving balance, coordination, kinesthetic sense, posture, motor skill, proprioception of core, proximal hip and LE for self care, mobility, lifting, and ambulation      Manual Treatments:  PROM / STM / Oscillations-Mobs:  G-I, II, III, IV (PA's, Inf., Post.)  [] (28401) Provided manual therapy to mobilize proximal hip and LS spine soft tissue/joints for the purpose of modulating pain, promoting relaxation,  increasing ROM, reducing/eliminating soft tissue swelling/inflammation/restriction, improving soft tissue extensibility and allowing for proper ROM for normal function with self care, mobility, lifting and ambulation. []CRP:  Canalith Repositioning procedure for the assessment, treatment and education of BPPV    Modalities:       Charges:  Timed Code Treatment Minutes: 30   Total Treatment Minutes: 30     19/24  units billed this visit. [] EVAL    [] Dry Needling  [x] TW(47683)   x  1   [] EStim Unattended 81758  [] NMR (31141)  x     [] Estim Attended  93879  [] Manual (70986)  x    [x] Mechanical Txn 47647  [] TA    x     [] Ultrasound  [] Gait   x  [] Vaso  [] CRP    [] Ionto           [] Other:        Electronically signed by: Ajith Mark, PT, MPT, OMT-C #[id number]        Note: If patient does not return for scheduled/ recommended follow up visits, this note will serve as a discharge from care along with most recent update on progress.

## 2022-08-17 ENCOUNTER — APPOINTMENT (OUTPATIENT)
Dept: CARDIAC REHAB | Age: 62
End: 2022-08-17
Payer: COMMERCIAL

## 2022-08-18 ENCOUNTER — HOSPITAL ENCOUNTER (OUTPATIENT)
Dept: PHYSICAL THERAPY | Age: 62
Setting detail: THERAPIES SERIES
Discharge: HOME | End: 2022-08-18
Payer: COMMERCIAL

## 2022-08-18 PROCEDURE — 97012 MECHANICAL TRACTION THERAPY: CPT

## 2022-08-18 PROCEDURE — 97110 THERAPEUTIC EXERCISES: CPT

## 2022-08-18 NOTE — FLOWSHEET NOTE
Physical Therapy Daily Treatment Note    [x]Daily Tx Note    []Progress Note    [] Discharge Summary           Date:  2022    Patient Name:  Wendy Brito    :  1960  MRN: [id number]      Medical/Treatment Diagnosis Information:  Diagnosis: DDD, Lumbar M51.36   Pelvic alignment syndrome with sacral component    Insurance/Certification information:   [facility]     Physician Information:  Referring Provider (secondary): Alysa Rea      Plan of care sent to provider:      []Faxed   []Co-signature    (attempts: 1[x] 22 2 []3[])     Plan of care signed :  []  Yes  [x] No      Date of Patient follow up with Physician:     Is this a Progress Report:     []  Yes  [x]  No      If Yes:  Date Range for reporting period:  Beginning  2022  Ending    Progress report will be due (10 Rx or 30 days whichever is less):    3/50/08    Recertification will be due (POC Duration  / 90 days whichever is less): 10/19/22      Visit # Insurance Allowable Auth Required      24 Units through  []  Yes []  No        Functional Scale:  FOTO    Date    2022  Score   29  Predicted Score: 44  Predicted Visits:  11    Functional Scale:  FOTO    Date    2022  Score   40  Predicted Score: 44  Predicted Visits:  11    Functional Scale:  FOTO    Date    2022  Score   22  Predicted Score: 44  Predicted Visits:  11    Latex Allergy:  [x]NO      []YES  Preferred Language for Healthcare:   [x]English       []other:    RESTRICTIONS/PRECAUTIONS:      SUBJECTIVE:    Pt reports that he remains in severe pain. Rates the pain approx   Went to a concert last night and was in pain. Feels like overall he is getting worse. Is required to continue therapy for 6 weeks before he can get an MRI or injection. That will be the week of Aug 23.         Pain level:  7/10 at onset of tx today, 8/10 at end of session today  Eval  8/10      Plan Moving Forward/ For next visit:    Reassess alignment    Manual tx Progress HEP      OBJECTIVE:      Exercises/Interventions:     Exercises in bold performed in department today. Items not bolded are carried forward from prior visits for continuity of the record. Exercise/Equipment Resistance/Repetitions HEP Other comments   []      Sit to stand with GS  Modified to standing GS X10  X10 with 5 sec hold [x] VC's for technique and muscle activation -terminated 2/2 pain throughout entire movement     GS  5 sec hold x 10  [x]      TA/ Pelvic Tilt 5 sec hold x 10       Bridge x5 [] terminated 2/2 pain     Supine knee rocks x10  terminated 2/2 pain     Sidelying clamshells B 2x10 [x]      Seated HS stretch 3 x 30 sec [x]      Sidelying hip abd 2x10 []      Standing hip ext in // bars 2x10 [] Pain in back when performing ex with RLE      Standing mini squats 2x10 []      Seated marching 1x10 []         []        []        []        []        []        []        []        []      Therapeutic Exercise/Home Exercise Program:   15 minutes  Pt education provided regarding anatomy and physiology associated with dx, etiology of Symptoms and plan of care. All questions answered to pt satisfaction. Review of complaints of lack of progress and exacerbation of pain. Pt advised to contact MD 2/2 poor tolerance to therapy sessions. Pt unable to tolerate therex or manual techniques in supine - modified to sidelying. Pt demonstrated understanding of exercises in a pain-free range. Pt reported feeling comfortable performing these exercises at home. Therapeutic Activity:  0 minutes     Gait: 0 minutes    Neuromuscular Re-Education:  0 minutes      Canalith Repositioning Procedure:      Manual Therapy:  0 minutes  Pt unable to tolerate manual therapy today 2/2 reports of pain         Modalities: 15 minutes  Trial # 4 of mechanical traction  60 kg pull, 25kg release  Pt attended to for entire course of tx. Denies improvement at end of session.       ASSESSMENT:  Pt reports no improvement in symptoms. Feels that his back is getting worse. Limiting all of his activity. Poor tolerance to mechanical traction with no reports of improvement. Hopes to see the MD next week after his 6 weeks of therapy. GOALS:  Patient stated goal:  To be able to walk again without pain and not need the walker  [] Progressing: [] Met: [] Not Met: [] Adjusted        Therapist goals for Patient:  Short Term Goals: To be achieved in: 2 weeks  1. Independent in HEP and progression per patient tolerance, in order to prevent re-injury. [] Progressing: [] Met: [] Not Met: [] Adjusted  2. Patient will have a decrease in pain to facilitate improvement in movement, function, and ADLs as indicated by Functional Deficits. [] Progressing: [] Met: [] Not Met: [] Adjusted        Long Term Goals: To be achieved in: 6 weeks  1. Disability index score of 44 or Greater per FOTO to assist with reaching prior level of function. [] Progressing: [] Met: [] Not Met: [] Adjusted  2. Patient will demonstrate increased AROM to WNL, good LS mobility, good hip ROM to allow for proper joint functioning as indicated by patients Functional Deficits. [] Progressing: [] Met: [] Not Met: [] Adjusted  3. Patient will demonstrate an increase in Strength to good proximal hip and core activation to allow for proper functional mobility as indicated by patients Functional Deficits. [] Progressing: [] Met: [] Not Met: [] Adjusted  4. Patient will return to walking and moving around his house without increased symptoms or restriction. [] Progressing: [] Met: [] Not Met: [] Adjusted  5. Pt to return to (patient specific functional goal)    [] Progressing: [] Met: [] Not Met: [] Adjusted                  Overall Progression Towards Functional goals/ Treatment Progress Update:  [] Patient is progressing as expected towards functional goals listed. [] Progression is slowed due to complexities/Impairments listed.   [] Progression has been slowed due to co-morbidities. [x] Plan just implemented, too soon to assess goals progression <30days   [] Goals require adjustment due to lack of progress  [] Patient is not progressing as expected and requires additional follow up with physician  [] Other    Prognosis for POC: [x] Good [] Fair  [] Poor    Patient requires continued skilled intervention: [x] Yes  [] No    Treatment/Activity Tolerance:  [x] Patient able to complete treatment  [] Patient limited by fatigue  [] Patient limited by pain    [] Patient limited by other medical complications  [] Other:         PLAN: See eval  [x] Continue per plan of care [] Alter current plan (see comments above)  [] Plan of care initiated [] Hold pending MD visit [] Discharge        Therapeutic Exercise and NMR EXR  [x] (04269) Provided verbal/tactile cueing for activities related to strengthening, flexibility, endurance, ROM  for improvements in proximal strength and core control with self care, mobility, lifting and ambulation.  [] (28592) Provided verbal/tactile cueing for activities related to improving balance, coordination, kinesthetic sense, posture, motor skill, proprioception  to assist with core control in self care, mobility, lifting, and ambulation.      Therapeutic Activities and Gait:    [] (45183 or 49637) Provided verbal/tactile cueing for activities related to improving balance, coordination, kinesthetic sense, posture, motor skill, proprioception and motor activation to allow for proper function  with self care and ADLs  [] (22858) Provided training and instruction to the patient for proper core and proximal hip recruitment and positioning with ambulation re-education     Home Exercise Program:    [x] (76264) Reviewed/Progressed HEP activities related to strengthening, flexibility, endurance, ROM of core, proximal hip and LE for functional self-care, mobility, lifting and ambulation   [] (48035) Reviewed/Progressed HEP activities related to improving balance, coordination, kinesthetic sense, posture, motor skill, proprioception of core, proximal hip and LE for self care, mobility, lifting, and ambulation      Manual Treatments:  PROM / STM / Oscillations-Mobs:  G-I, II, III, IV (PA's, Inf., Post.)  [] (17235) Provided manual therapy to mobilize proximal hip and LS spine soft tissue/joints for the purpose of modulating pain, promoting relaxation,  increasing ROM, reducing/eliminating soft tissue swelling/inflammation/restriction, improving soft tissue extensibility and allowing for proper ROM for normal function with self care, mobility, lifting and ambulation. []CRP:  Canalith Repositioning procedure for the assessment, treatment and education of BPPV    Modalities:       Charges:  Timed Code Treatment Minutes: 30   Total Treatment Minutes: 30     19/24  units billed this visit. [] EVAL    [] Dry Needling  [x] AR(80471)   x  1   [] EStim Unattended 71885  [] NMR (12622)  x     [] Estim Attended  59577  [] Manual (87839)  x    [x] Mechanical Txn 12553  [] TA    x     [] Ultrasound  [] Gait   x  [] Vaso  [] CRP    [] Ionto           [] Other:        Electronically signed by: Amanda Segura, PT, MPT, OMT-C [id number]        Note: If patient does not return for scheduled/ recommended follow up visits, this note will serve as a discharge from care along with most recent update on progress.

## 2022-08-19 ENCOUNTER — APPOINTMENT (OUTPATIENT)
Dept: CARDIAC REHAB | Age: 62
End: 2022-08-19
Payer: COMMERCIAL

## 2022-08-22 ENCOUNTER — APPOINTMENT (OUTPATIENT)
Dept: CARDIAC REHAB | Age: 62
End: 2022-08-22
Payer: COMMERCIAL

## 2022-08-23 ENCOUNTER — TELEPHONE (OUTPATIENT)
Dept: ORTHOPEDIC SURGERY | Age: 62
End: 2022-08-23

## 2022-08-23 ENCOUNTER — TELEPHONE (OUTPATIENT)
Dept: CARDIOLOGY CLINIC | Age: 62
End: 2022-08-23

## 2022-08-23 ENCOUNTER — APPOINTMENT (OUTPATIENT)
Dept: PHYSICAL THERAPY | Age: 62
End: 2022-08-23
Payer: COMMERCIAL

## 2022-08-23 DIAGNOSIS — M51.36 DDD (DEGENERATIVE DISC DISEASE), LUMBAR: Primary | ICD-10-CM

## 2022-08-23 DIAGNOSIS — M54.50 LUMBAR SPINE PAIN: ICD-10-CM

## 2022-08-23 NOTE — TELEPHONE ENCOUNTER
General Question     Subject: FOLLOW UP AFTER PHYSICAL THERAPY  Patient and /or Facility Request: Alexandria Scriver  Contact Number: [phone]    PATIENT CALLED STATING HE WILL BE FINISHING PHYSICAL THERAPY THIS WEEK AND WANTS TO FOLLOW UP WITH DR. BURK REGARDING HIS LUMBAR PAIN. PLEASE CALL PATIENT AT THE ABOVE NUMBER.

## 2022-08-23 NOTE — TELEPHONE ENCOUNTER
Patient is MRI compatible from a device standpoint, however I'm not sure if he is MRI compatible with stents. Will defer that question to other provider/staff.

## 2022-08-23 NOTE — TELEPHONE ENCOUNTER
Pt called i and stated he has a pacemaker and 11 stents and was wondering if he was MRI compatible.  Please advise

## 2022-08-23 NOTE — TELEPHONE ENCOUNTER
Spoke to him and he has many stents and pacemaker, so he chose to go with the ct myelogram vs the MRI. Sent the order for prior auth.

## 2022-08-24 ENCOUNTER — APPOINTMENT (OUTPATIENT)
Dept: CARDIAC REHAB | Age: 62
End: 2022-08-24
Payer: COMMERCIAL

## 2022-08-25 ENCOUNTER — HOSPITAL ENCOUNTER (OUTPATIENT)
Dept: PHYSICAL THERAPY | Age: 62
Setting detail: THERAPIES SERIES
Discharge: HOME | End: 2022-08-25
Payer: COMMERCIAL

## 2022-08-25 PROCEDURE — 97140 MANUAL THERAPY 1/> REGIONS: CPT

## 2022-08-25 PROCEDURE — 97110 THERAPEUTIC EXERCISES: CPT

## 2022-08-25 NOTE — FLOWSHEET NOTE
Physical Therapy Daily Treatment Note    [x]Daily Tx Note    []Progress Note    [] Discharge Summary           Date:  2022    Patient Name:  Merna Villareal    :  1960  MRN: [id number]      Medical/Treatment Diagnosis Information:  Diagnosis: DDD, Lumbar M51.36   Pelvic alignment syndrome with sacral component    Insurance/Certification information:   [facility]     Physician Information:  Referring Provider (secondary): Hemal Rodriguez      Plan of care sent to provider:      []Faxed   []Co-signature    (attempts: 1[x] 22 2 []3[])     Plan of care signed :  []  Yes  [x] No      Date of Patient follow up with Physician:     Is this a Progress Report:     []  Yes  [x]  No      If Yes:  Date Range for reporting period:  Beginning  2022  Ending    Progress report will be due (10 Rx or 30 days whichever is less):    2/82/10    Recertification will be due (POC Duration  / 90 days whichever is less): 10/19/22      Visit # Insurance Allowable Auth Required      24 Units through  []  Yes []  No        Functional Scale:  FOTO    Date    2022  Score   29  Predicted Score: 44  Predicted Visits:  11    Functional Scale:  FOTO    Date    2022  Score   40  Predicted Score: 44  Predicted Visits:  11    Functional Scale:  FOTO    Date    2022  Score   22  Predicted Score: 44  Predicted Visits:  11    Latex Allergy:  [x]NO      []YES  Preferred Language for Healthcare:   [x]English       []other:    RESTRICTIONS/PRECAUTIONS:      SUBJECTIVE:    Pt states that he feels a severe increase in pain after the traction, especially when he first stands up. Later in the day he may feel a little better but the treatment itself is very painful. Did go on a \"Last Ride\" yesterday, so is painful today. Called the back MD and they are scheduling a CT scan of his back. Has not been called back yet. Has not started sterroids.        Pain level:  7/10 at onset of tx today  Eval  810      Plan Moving Forward/ For next visit:    Reassess alignment    Manual tx    Progress HEP      OBJECTIVE:      Exercises/Interventions:     Exercises in bold performed in department today. Items not bolded are carried forward from prior visits for continuity of the record. Exercise/Equipment Resistance/Repetitions HEP Other comments   []      Sit to stand with GS  Modified to standing GS X10  X10 with 5 sec hold [x] VC's for technique and muscle activation -terminated 2/2 pain throughout entire movement     GS  5 sec hold x 10  [x]      TA/ Pelvic Tilt 5 sec hold x 10       Bridge x5 [] terminated 2/2 pain     Supine knee rocks x10  terminated 2/2 pain     Sidelying clamshells B 2x10 [x]      Seated HS stretch 3 x 30 sec [x]      Sidelying hip abd 2x10 []      Standing hip ext in // bars 2x10 [] Pain in back when performing ex with RLE      Standing mini squats 2x10 [x]      Standing ABduction 3x15 x      Seated marching 1x10 []         []     Resisted IR in sitting , with ADD squeeze  3x15 [x]        []        []        []        []        []        []      Therapeutic Exercise/Home Exercise Program:   15 minutes  Pt education provided regarding anatomy and physiology associated with dx, etiology of Symptoms and plan of care. All questions answered to pt satisfaction. Pt with apparent Leg length discrepancy, short on RIGHT this visit. Pt indicates that he is wearing a lift in his LEFT shoe. Trial of lift in RIGHT shoe does equalize pelvis in standing, however feels unlevel to pt. Pt advised to trial reduction of 1 ply of lift, and trial of wearing lift in RIGHT shoe for one week, beginning 1 hour at a time. Continue with bridge exercises and standing hip / glute strength exercises. Pt unable to tolerate therex or manual techniques in supine - modified to sidelying. Pt demonstrated understanding of exercises in a pain-free range. Pt reported feeling comfortable performing these exercises at home. Therapeutic Activity:  0 minutes     Gait: 0 minutes    Neuromuscular Re-Education:  0 minutes      Canalith Repositioning Procedure:      Manual Therapy:  15  minutes      Modalities: 0 minutes   Omit per pt request 2/2 pain during prior traction session    ASSESSMENT:  Pt reports no improvement in symptoms. Reassess of pelvic alignment reveals cont pelvic/ sacral torsion with apparent Leg length discrepancy. Appears short on Right this date. Pt informs therapist that he has a 3 ply heel lift on the LEFT. Pt advised to trial heel lift in right shoe with one ply reduction. Cont HEP. Scheduled for CT scan of back once approved. Cont per POC. GOALS:  Patient stated goal:  To be able to walk again without pain and not need the walker  [] Progressing: [] Met: [] Not Met: [] Adjusted        Therapist goals for Patient:  Short Term Goals: To be achieved in: 2 weeks  1. Independent in HEP and progression per patient tolerance, in order to prevent re-injury. [] Progressing: [] Met: [] Not Met: [] Adjusted  2. Patient will have a decrease in pain to facilitate improvement in movement, function, and ADLs as indicated by Functional Deficits. [] Progressing: [] Met: [] Not Met: [] Adjusted        Long Term Goals: To be achieved in: 6 weeks  1. Disability index score of 44 or Greater per FOTO to assist with reaching prior level of function. [] Progressing: [] Met: [] Not Met: [] Adjusted  2. Patient will demonstrate increased AROM to WNL, good LS mobility, good hip ROM to allow for proper joint functioning as indicated by patients Functional Deficits. [] Progressing: [] Met: [] Not Met: [] Adjusted  3. Patient will demonstrate an increase in Strength to good proximal hip and core activation to allow for proper functional mobility as indicated by patients Functional Deficits. [] Progressing: [] Met: [] Not Met: [] Adjusted  4.  Patient will return to walking and moving around his house without increased (70071) Provided training and instruction to the patient for proper core and proximal hip recruitment and positioning with ambulation re-education     Home Exercise Program:    [x] (11694) Reviewed/Progressed HEP activities related to strengthening, flexibility, endurance, ROM of core, proximal hip and LE for functional self-care, mobility, lifting and ambulation   [] (91140) Reviewed/Progressed HEP activities related to improving balance, coordination, kinesthetic sense, posture, motor skill, proprioception of core, proximal hip and LE for self care, mobility, lifting, and ambulation      Manual Treatments:  PROM / STM / Oscillations-Mobs:  G-I, II, III, IV (PA's, Inf., Post.)  [] (27594) Provided manual therapy to mobilize proximal hip and LS spine soft tissue/joints for the purpose of modulating pain, promoting relaxation,  increasing ROM, reducing/eliminating soft tissue swelling/inflammation/restriction, improving soft tissue extensibility and allowing for proper ROM for normal function with self care, mobility, lifting and ambulation. []CRP:  Canalith Repositioning procedure for the assessment, treatment and education of BPPV    Modalities:       Charges:  Timed Code Treatment Minutes: 40   Total Treatment Minutes: 40     19/24  units billed this visit. [] EVAL    [] Dry Needling  [x] IA(20557)   x  2   [] EStim Unattended 40312  [] NMR (38658)  x     [] Estim Attended  12549  [x] Manual (49379)  x  1  [x] Mechanical Txn 50496  [] TA    x     [] Ultrasound  [] Gait   x  [] Vaso  [] CRP    [] Ionto           [] Other:        Electronically signed by: Serene Siu, PT, MPT, OMT-C #[id number]        Note: If patient does not return for scheduled/ recommended follow up visits, this note will serve as a discharge from care along with most recent update on progress.

## 2022-08-26 ENCOUNTER — APPOINTMENT (OUTPATIENT)
Dept: CARDIAC REHAB | Age: 62
End: 2022-08-26
Payer: COMMERCIAL

## 2022-08-26 DIAGNOSIS — M51.36 DDD (DEGENERATIVE DISC DISEASE), LUMBAR: Primary | ICD-10-CM

## 2022-08-26 DIAGNOSIS — M54.50 LUMBAR SPINE PAIN: ICD-10-CM

## 2022-08-30 ENCOUNTER — APPOINTMENT (OUTPATIENT)
Dept: PHYSICAL THERAPY | Age: 62
End: 2022-08-30
Payer: COMMERCIAL

## 2022-09-01 ENCOUNTER — HOSPITAL ENCOUNTER (OUTPATIENT)
Dept: PHYSICAL THERAPY | Age: 62
Setting detail: THERAPIES SERIES
Discharge: HOME | End: 2022-09-01
Payer: COMMERCIAL

## 2022-09-01 NOTE — PROGRESS NOTES
Physical Therapy  Cancellation/No-show Note    Patient Name:  Emily Soto  :  1960   Date:  2022  Cancels to Date: 1  No-shows to Date: 0    For today's appointment patient:  [x]  Cancelled  []  Rescheduled appointment  []  No-show     Reason given by patient:  [x]  Patient ill  []  Conflicting appointment  []  No transportation    []  Conflict with work  []  No reason given  []  Other:     Comments:      Electronically signed by:  Aye Garcia, PT, MPT, OMT-c [id number]

## 2022-09-02 RX ORDER — ONDANSETRON 4 MG/1
TABLET, FILM COATED ORAL
Qty: 30 TABLET | Refills: 0 | Status: SHIPPED | OUTPATIENT
Start: 2022-09-02 | End: 2022-10-24

## 2022-09-07 ENCOUNTER — HOSPITAL ENCOUNTER (OUTPATIENT)
Dept: INTERVENTIONAL RADIOLOGY/VASCULAR | Age: 62
Discharge: HOME | End: 2022-09-07
Payer: COMMERCIAL

## 2022-09-07 ENCOUNTER — HOSPITAL ENCOUNTER (OUTPATIENT)
Age: 62
Discharge: HOME | End: 2022-09-07
Payer: COMMERCIAL

## 2022-09-07 ENCOUNTER — HOSPITAL ENCOUNTER (OUTPATIENT)
Dept: CT IMAGING | Age: 62
Discharge: HOME | End: 2022-09-07
Payer: COMMERCIAL

## 2022-09-07 DIAGNOSIS — M51.36 DDD (DEGENERATIVE DISC DISEASE), LUMBAR: ICD-10-CM

## 2022-09-07 DIAGNOSIS — M54.50 LUMBAR SPINE PAIN: ICD-10-CM

## 2022-09-07 LAB
CREAT SERPL-MCNC: 1 MG/DL (ref 0.8–1.3)
GFR AFRICAN AMERICAN: >60
GFR NON-AFRICAN AMERICAN: >60

## 2022-09-07 PROCEDURE — 36415 COLL VENOUS BLD VENIPUNCTURE: CPT

## 2022-09-07 PROCEDURE — 82565 ASSAY OF CREATININE: CPT

## 2022-09-07 PROCEDURE — 6360000004 HC RX CONTRAST MEDICATION: Performed by: RADIOLOGY

## 2022-09-07 PROCEDURE — 72265 MYELOGRAPHY L-S SPINE: CPT

## 2022-09-07 PROCEDURE — 72132 CT LUMBAR SPINE W/DYE: CPT

## 2022-09-07 PROCEDURE — 2709999900 FL MYELOGRAM LUMBOSACRAL S&I

## 2022-09-07 RX ADMIN — IOHEXOL 10 ML: 240 INJECTION, SOLUTION INTRATHECAL; INTRAVASCULAR; INTRAVENOUS; ORAL at 14:16

## 2022-09-08 ENCOUNTER — HOSPITAL ENCOUNTER (OUTPATIENT)
Dept: PHYSICAL THERAPY | Age: 62
Setting detail: THERAPIES SERIES
Discharge: HOME | End: 2022-09-08
Payer: COMMERCIAL

## 2022-09-08 PROCEDURE — 97110 THERAPEUTIC EXERCISES: CPT

## 2022-09-08 PROCEDURE — 97140 MANUAL THERAPY 1/> REGIONS: CPT

## 2022-09-08 NOTE — FLOWSHEET NOTE
Physical Therapy Daily Treatment Note    [x]Daily Tx Note    [x]Progress Note    [] Discharge Summary       Dr. Rico Ribeiro has been seen for 10 Visits of PT. Initial eval date 22. Pt with minimal progress this episode, limited by severe pain and poor tolerance to therapy. Treatments have included manual treatment and mobilization, trial of mechanical txn, strengthening exercises and establishment of HEP. CT scan with significant findings, requiring additional input from MD.  Pt to follow up with physician to determine recommendations. No goals met at this time. Plan to HOLD PT at this time, with recommendation to return to MD.   Pt may benefit from additional therapy pending results of that consult. Will continue to follow. Please see attached treatment note for most recent status. Thank you for your referral of this patient. CHAU Cordero, [place], T-c #[id number]      Date:  2022    Patient Name:  Jayde Marcum    :  1960  MRN: [id number]      Medical/Treatment Diagnosis Information:  Diagnosis: DDD, Lumbar M51.36   Pelvic alignment syndrome with sacral component    Insurance/Certification information:   [facility]     Physician Information:  Referring Provider (secondary): Renae Ronquillo      Plan of care sent to provider:      []Faxed   []Co-signature    (attempts: 1[x] 22 2 []3[])     Plan of care signed :  []  Yes  [x] No      Date of Patient follow up with Physician:     Is this a Progress Report:     [x]  Yes  []  No      If Yes:  Date Range for reporting period:  Beginning  2022  Ending 3/7/39    Recertification will be due (POC Duration  / 90 days whichever is less): 10/19/22      Visit # Insurance Allowable Auth Required   10/12   24 Units through .     Extending Plan of care this visit [x]  Yes []  No        Functional Scale:  FOTO    Date    2022  Score   29  Predicted Score: 44  Predicted Visits:  11    Functional Scale: FOTO    Date    8/4/2022  Score   40  Predicted Score: 44  Predicted Visits:  11    Functional Scale:  FOTO    Date    8/18/2022  Score   22  Predicted Score: 44  Predicted Visits:  11    Latex Allergy:  [x]NO      []YES  Preferred Language for Healthcare:   [x]English       []other:    RESTRICTIONS/PRECAUTIONS:      SUBJECTIVE:    Pt states that he had his CT scan yesterday. Results appeared in his My Chart. Pt with questions regarding results. Results reviewed within scope of PT practice. Pt advised to contact MD.  Has call in to MD today, to determine next steps. His back is hurting worse today after the procedure. He has shifted his heel lift to the Right side, which seems to be helping. Pain level:  6/10 at onset of tx today  Eval  8/10      Plan Moving Forward/ For next visit:    Reassess alignment    Manual tx    Progress HEP      OBJECTIVE:  Reassess for Progress Note:   Pin: 6/10  Alignment: Crest high on Left, low on Right;  Sacrum deep on Right with (+) fortins sign  Reports back pain with resisted Adduction*  Range Tested MMT/ Resisted   *denotes pain Left Right   Hip Flexion  (L1-2) 4 4   Hip Extension       Hip Abduction  (L5) 4 4   Hip Adduction  (L3) 3+* 3+*   Hip IR       Hip ER       Knee Flexion  (L5,S1) 4+ 4+   Knee Extension  (L3,4) 4- 4-   Ankle Dorsiflex  (L4) 4 4   Ankle Plantarflex  (S1,2) 3 3   Ankle Inversion             Exercises/Interventions:     Exercises in bold performed in department today. Items not bolded are carried forward from prior visits for continuity of the record.   Exercise/Equipment Resistance/Repetitions HEP Other comments   []      Sit to stand with GS  Modified to standing GS X10  X10 with 5 sec hold [x] VC's for technique and muscle activation -terminated 2/2 pain throughout entire movement     GS  5 sec hold x 10  [x]      TA/ Pelvic Tilt 5 sec hold x 10       Bridge x5 [] terminated 2/2 pain     Supine knee rocks x10  terminated 2/2 pain     Sidelying tin B 2x10 [x]      Seated HS stretch 3 x 30 sec [x]      Sidelying hip abd 2x10 []      Standing hip ext in // bars 2x10 [] Pain in back when performing ex with RLE      Standing mini squats 2x10 [x]      Standing ABduction 3x15 x      Seated marching 1x10 []         []     Resisted IR in sitting , with ADD squeeze  3x15 [x]        []        []        []        []        []        []      Therapeutic Exercise/Home Exercise Program:   24 minutes  Reassess for Progress Note  Pt education provided regarding anatomy and physiology associated with dx, etiology of Symptoms and plan of care. All questions answered to pt satisfaction. Review of CT results within scope of PT practice. Pt will schedule appt with MD to full review and to determine course of treatment moving forward. Pt with apparent Leg length discrepancy, cont short on RIGHT this visit. Pt indicates that he is wearing the lift on the RIGHT, and feels more level and even. Pt indicates that he is wearing a lift in his LEFT shoe. Trial of lift in RIGHT shoe does equalize pelvis in standing, however feels unlevel to pt. Pt advised to continue with lift in Right shoe. Continue with bridge exercises and standing hip / glute strength exercises. Therapeutic Activity:  0 minutes     Gait: 0 minutes    Neuromuscular Re-Education:  0 minutes      Canalith Repositioning Procedure:      Manual Therapy:  17 minutes  R LE short in supine. MET to reduce pelvic rotation with sacral torsion. Sacrum prominent on LEFT , Deep on RIGHT  Long axis distraction mob with some relief   Supine lumbo pelvic mobilization to RIGHT      Modalities: 0 minutes   Omit per pt request 2/2 pain during prior traction session    ASSESSMENT:  Pt reports no improvement in symptoms. Reassess of pelvic alignment reveals cont pelvic/ sacral torsion with apparent Leg length discrepancy. Appears short on Right this date. CT scan indicates severe stenosis at L5.   Pt to progression <30days   [] Goals require adjustment due to lack of progress  [x] Patient is not progressing as expected and requires additional follow up with physician  [] Other    Prognosis for POC: [x] Good [] Fair  [] Poor    Patient requires continued skilled intervention: [x] Yes  [] No    Treatment/Activity Tolerance:  [] Patient able to complete treatment  [] Patient limited by fatigue  [x] Patient limited by pain    [] Patient limited by other medical complications  [] Other:         PLAN: See eval  [] Continue per plan of care [] Alter current plan (see comments above)  [] Plan of care initiated [x] Hold pending MD visit [] Discharge        Therapeutic Exercise and NMR EXR  [x] (54887) Provided verbal/tactile cueing for activities related to strengthening, flexibility, endurance, ROM  for improvements in proximal strength and core control with self care, mobility, lifting and ambulation.  [] (85336) Provided verbal/tactile cueing for activities related to improving balance, coordination, kinesthetic sense, posture, motor skill, proprioception  to assist with core control in self care, mobility, lifting, and ambulation.      Therapeutic Activities and Gait:    [] (57811 or 72845) Provided verbal/tactile cueing for activities related to improving balance, coordination, kinesthetic sense, posture, motor skill, proprioception and motor activation to allow for proper function  with self care and ADLs  [] (63581) Provided training and instruction to the patient for proper core and proximal hip recruitment and positioning with ambulation re-education     Home Exercise Program:    [x] (26804) Reviewed/Progressed HEP activities related to strengthening, flexibility, endurance, ROM of core, proximal hip and LE for functional self-care, mobility, lifting and ambulation   [] (23328) Reviewed/Progressed HEP activities related to improving balance, coordination, kinesthetic sense, posture, motor skill, proprioception of core, proximal hip and LE for self care, mobility, lifting, and ambulation      Manual Treatments:  PROM / STM / Oscillations-Mobs:  G-I, II, III, IV (PA's, Inf., Post.)  [] (58939) Provided manual therapy to mobilize proximal hip and LS spine soft tissue/joints for the purpose of modulating pain, promoting relaxation,  increasing ROM, reducing/eliminating soft tissue swelling/inflammation/restriction, improving soft tissue extensibility and allowing for proper ROM for normal function with self care, mobility, lifting and ambulation. []CRP:  Canalith Repositioning procedure for the assessment, treatment and education of BPPV    Modalities:       Charges:  Timed Code Treatment Minutes: 41   Total Treatment Minutes: 41     24/24  units billed last visit 9/1/22  Extend plan of care this visit. [] EVAL    [] Dry Needling  [x] RK(27480)   x  2   [] EStim Unattended 22205  [] NMR (09573)  x     [] Estim Attended  27465  [x] Manual (87069)  x  1  [] Mechanical Txn 69268  [] TA    x     [] Ultrasound  [] Gait   x  [] Vaso  [] CRP    [] Ionto           [] Other:        Electronically signed by: Milton Green, PT, MPT, OMT-C #[id number]        Note: If patient does not return for scheduled/ recommended follow up visits, this note will serve as a discharge from care along with most recent update on progress.

## 2022-09-09 ENCOUNTER — TELEPHONE (OUTPATIENT)
Dept: ORTHOPEDIC SURGERY | Age: 62
End: 2022-09-09

## 2022-09-09 DIAGNOSIS — M54.50 LUMBAR SPINE PAIN: ICD-10-CM

## 2022-09-09 DIAGNOSIS — M51.36 DDD (DEGENERATIVE DISC DISEASE), LUMBAR: Primary | ICD-10-CM

## 2022-09-09 NOTE — TELEPHONE ENCOUNTER
----- Message from Donovan Jiménez MD sent at 9/8/2022  1:02 PM EDT -----  CT myelogram  Arthritis with nerve compression  Would recommend GABRIEL  Could follow-up if symptoms persist after the

## 2022-10-11 ENCOUNTER — NURSE ONLY (OUTPATIENT)
Dept: CARDIOLOGY CLINIC | Age: 62
End: 2022-10-11
Payer: COMMERCIAL

## 2022-10-11 DIAGNOSIS — Z95.0 PACEMAKER: ICD-10-CM

## 2022-10-11 DIAGNOSIS — I46.9 ASYSTOLE (HCC): Primary | ICD-10-CM

## 2022-10-11 DIAGNOSIS — I44.2 CHB (COMPLETE HEART BLOCK) (HCC): ICD-10-CM

## 2022-10-11 PROCEDURE — 93294 REM INTERROG EVL PM/LDLS PM: CPT | Performed by: INTERNAL MEDICINE

## 2022-10-11 PROCEDURE — 93296 REM INTERROG EVL PM/IDS: CPT | Performed by: INTERNAL MEDICINE

## 2022-10-13 ENCOUNTER — OFFICE VISIT (OUTPATIENT)
Dept: PULMONOLOGY | Age: 62
End: 2022-10-13
Payer: COMMERCIAL

## 2022-10-13 ENCOUNTER — TELEPHONE (OUTPATIENT)
Dept: PULMONOLOGY | Age: 62
End: 2022-10-13

## 2022-10-13 VITALS
HEART RATE: 81 BPM | SYSTOLIC BLOOD PRESSURE: 138 MMHG | OXYGEN SATURATION: 93 % | WEIGHT: 300 LBS | BODY MASS INDEX: 51.22 KG/M2 | DIASTOLIC BLOOD PRESSURE: 80 MMHG | HEIGHT: 64 IN | RESPIRATION RATE: 16 BRPM

## 2022-10-13 DIAGNOSIS — G47.33 OSA (OBSTRUCTIVE SLEEP APNEA): ICD-10-CM

## 2022-10-13 DIAGNOSIS — R06.09 DYSPNEA ON EXERTION: ICD-10-CM

## 2022-10-13 DIAGNOSIS — R00.1 BRADYCARDIA: ICD-10-CM

## 2022-10-13 DIAGNOSIS — Z87.891 PERSONAL HISTORY OF TOBACCO USE: Primary | ICD-10-CM

## 2022-10-13 DIAGNOSIS — J44.9 MODERATE COPD (CHRONIC OBSTRUCTIVE PULMONARY DISEASE) (HCC): ICD-10-CM

## 2022-10-13 DIAGNOSIS — Z87.891 HISTORY OF TOBACCO USE: ICD-10-CM

## 2022-10-13 PROCEDURE — 99214 OFFICE O/P EST MOD 30 MIN: CPT | Performed by: INTERNAL MEDICINE

## 2022-10-13 PROCEDURE — 3023F SPIROM DOC REV: CPT | Performed by: INTERNAL MEDICINE

## 2022-10-13 PROCEDURE — G8427 DOCREV CUR MEDS BY ELIG CLIN: HCPCS | Performed by: INTERNAL MEDICINE

## 2022-10-13 PROCEDURE — G8484 FLU IMMUNIZE NO ADMIN: HCPCS | Performed by: INTERNAL MEDICINE

## 2022-10-13 PROCEDURE — G0296 VISIT TO DETERM LDCT ELIG: HCPCS | Performed by: INTERNAL MEDICINE

## 2022-10-13 PROCEDURE — G8417 CALC BMI ABV UP PARAM F/U: HCPCS | Performed by: INTERNAL MEDICINE

## 2022-10-13 PROCEDURE — 3017F COLORECTAL CA SCREEN DOC REV: CPT | Performed by: INTERNAL MEDICINE

## 2022-10-13 PROCEDURE — 1036F TOBACCO NON-USER: CPT | Performed by: INTERNAL MEDICINE

## 2022-10-13 RX ORDER — FLUTICASONE FUROATE, UMECLIDINIUM BROMIDE AND VILANTEROL TRIFENATATE 100; 62.5; 25 UG/1; UG/1; UG/1
1 POWDER RESPIRATORY (INHALATION) DAILY
Qty: 3 EACH | Refills: 1 | Status: SHIPPED | OUTPATIENT
Start: 2022-10-13

## 2022-10-13 RX ORDER — ALBUTEROL SULFATE 90 UG/1
2 AEROSOL, METERED RESPIRATORY (INHALATION) EVERY 4 HOURS PRN
Qty: 3 EACH | Refills: 1 | Status: SHIPPED | OUTPATIENT
Start: 2022-10-13

## 2022-10-13 RX ORDER — ALBUTEROL SULFATE 2.5 MG/3ML
2.5 SOLUTION RESPIRATORY (INHALATION) EVERY 6 HOURS PRN
Qty: 360 EACH | Refills: 5 | Status: SHIPPED | OUTPATIENT
Start: 2022-10-13

## 2022-10-13 ASSESSMENT — SLEEP AND FATIGUE QUESTIONNAIRES
HOW LIKELY ARE YOU TO NOD OFF OR FALL ASLEEP WHEN YOU ARE A PASSENGER IN A CAR FOR AN HOUR WITHOUT A BREAK: 3
NECK CIRCUMFERENCE (INCHES): 20
HOW LIKELY ARE YOU TO NOD OFF OR FALL ASLEEP WHILE SITTING AND TALKING TO SOMEONE: 1
ESS TOTAL SCORE: 15
HOW LIKELY ARE YOU TO NOD OFF OR FALL ASLEEP WHILE SITTING QUIETLY AFTER LUNCH WITHOUT ALCOHOL: 2
HOW LIKELY ARE YOU TO NOD OFF OR FALL ASLEEP WHILE SITTING AND READING: 0
HOW LIKELY ARE YOU TO NOD OFF OR FALL ASLEEP WHILE LYING DOWN TO REST IN THE AFTERNOON WHEN CIRCUMSTANCES PERMIT: 3
HOW LIKELY ARE YOU TO NOD OFF OR FALL ASLEEP WHILE SITTING INACTIVE IN A PUBLIC PLACE: 2
HOW LIKELY ARE YOU TO NOD OFF OR FALL ASLEEP IN A CAR, WHILE STOPPED FOR A FEW MINUTES IN TRAFFIC: 1
HOW LIKELY ARE YOU TO NOD OFF OR FALL ASLEEP WHILE WATCHING TV: 3

## 2022-10-13 NOTE — PROGRESS NOTES
[facility]                                                                CHIEF COMPLAINT: follow up OANH/COPD      HPI:   Doing pretty good  Trelegy is helping a lot   Uses Albuterol 2 times/day   No cough or sputum  No hemoptysis   Not needing to use O2- O2 sat > 95% al the time still not able to use his BiPAP, just can not put anything on his face. From prior visit:   64years old with chest pain being followed by Dr. Yvonne Sotomayor here for sleep apnea evaluation and shortness of breath. Wakes up at night choking and gasping for air for 3.5 years, severe at times. Associated with observed sleep apnea and hypersomnia. No snoring. Better when he sits up and worse when laying down. No restorative sleep. + dry mouth upon awakening. Patient is complaining of daytime sleepiness, fatigue and tiredness at times during the day. Bedtime 8-10 pm and rise time is 5:30 am. Sleep onset 60 minutes. Watches TV in bedroom. + morning headache. 1 nocturia. Wakes up 4-5 times at night. 2-3 nap/weekf or 30-45 min. No car wrecks or near wrecks because of the sleepiness. No nodding off while driving. LEE for 3.5 years, mild. Dyspnea worse with exertion and better with resting. Albuterol helps his breathing. Uses 2-3 times/day. Associated with cough and wheezes. Able to walk mail box and back.  Smoker 1 ppd for the past 45 years- smokes now 1.5 ppd     Old records reviewed by me and summarizedc5        Past Medical History:   Diagnosis Date    Acute diverticulitis 04/10/2021    Acute kidney injury ([address] 04/08/2021    Acute on chronic respiratory failure with hypoxemia (HCC)     Acute respiratory failure ([address] 08/19/2020    Acute respiratory failure with hypoxia ([address]     Anxiety 01/06/2020    Aortic valve calcification 09/2020    seen on lung CT    CAD in native artery 04/14/2021    Chronic obstructive pulmonary disease ([address] 09/03/2019    PFT done 9/03/19 Chronic systolic congestive heart failure (HCC) 04/10/2021    Class 3 severe obesity with body mass index (BMI) of 45.0 to 49.9 in adult [facility])     Coronary artery calcification 09/2020    seen on lung ct    Coronary artery disease involving native heart with angina pectoris [facility] 09/22/2020    Crush injury of right foot 07/23/2015    DDD (degenerative disc disease), lumbar 01/06/2020    Depression 01/06/2020    Diverticulitis of colon 04/10/2021    Erectile dysfunction 01/06/2020    Fatigue 01/06/2020    HNP (herniated nucleus pulposus), lumbar 01/06/2020    Hyperglycemia 01/06/2020    Hyperlipidemia     Hypersomnia 01/06/2020    Hypertension     Insomnia     Ischemic cardiomyopathy     Kidney stone     Lumbar radiculopathy 01/06/2020    Lumbar spine pain 01/06/2020    LVH (left ventricular hypertrophy)     seen on echo 8/2020, moderate    Mobitz type I Wenckebach atrioventricular block 04/10/2021    Moderate protein-calorie malnutrition ([facility] 03/17/2020    Observed sleep apnea 01/06/2020    OANH (obstructive sleep apnea) 01/06/2020    no C-pap is getting tested    Pneumonia, primary atypical     Reactive depression 01/06/2020    S/P three vessel coronary artery bypass 10/26/2020    Spondylosis without myelopathy or radiculopathy, lumbar region 01/06/2020    Tobacco abuse 01/06/2020    Tobacco use 01/06/2020    Wears dentures     full set       Past Surgical History:        Procedure Laterality Date    ANKLE SURGERY Left 08/02/2021    LEFT FOOT SCREW REMOVAL performed by Mart Dinh MD at [address] [facility]  03/2021    CORONARY ARTERY BYPASS GRAFT N/A 09/25/2020    CORONARY ARTERY BYPASS GRAFTING X3, INTERNAL MAMMARY ARTERY, SAPHENOUS VEIN GRAFT, ON PUMP, STERNAL PLATING, 5 LEVEL BILATERAL INTERCOSTAL NERVE BLOCK, PLATELET GEL APPLICATION performed by Yuki Coffman MD at [address] [facility]  03/16/2011    cystoscopy left ureteroscopy, left retrograde, double J stent placement    FINGER AMPUTATION Right 02/02/2021    RIGHT MIDDLE FINGER IRRIGATION AND DEBRIDEMENT WITH REVISION AMPUTATION AND BONE SHORTENING, POSSIBLE NAIL ABLATION performed by Heri Dabry MD at [facility]      Matrixectomy-NaOH/Phenol    PAIN MANAGEMENT PROCEDURE Right 12/24/2019    RIGHT LUMBAR FIVE SACRAL ONE TRANSFORAMINAL EPIDURAL STEROID INJECTION SITE CONFIRMED BY FLUOROSCOPY performed by Milo Morin MD at [address] Right 01/07/2020    RIGHT LUMBAR FOUR AND LUMBAR FIVE TRANSFORAMINAL EPIDURAL STEROID INJECTION SITE CONFIRMED BY FLUOROSCOPY performed by Milo Morin MD at [place] [address]       Allergies:  is allergic to dilaudid [hydromorphone hcl] and codeine. Social History:    TOBACCO:   reports that he quit smoking about 2 years ago. His smoking use included cigarettes. He has a 70.00 pack-year smoking history. He has never used smokeless tobacco.  ETOH:   reports no history of alcohol use.       Family History:       Problem Relation Age of Onset    Heart Disease Mother     Other Mother         copd    High Blood Pressure Sister     No Known Problems Sister        Current Medications:    Current Outpatient Medications:     ondansetron (ZOFRAN) 4 MG tablet, TAKE ONE TABLET BY MOUTH DAILY AS NEEDED FOR NAUSEA OR VOMITING, Disp: 30 tablet, Rfl: 0    benzonatate (TESSALON) 200 MG capsule, TAKE ONE CAPSULE BY MOUTH THREE TIMES A DAY AS NEEDED FOR COUGH, Disp: 30 capsule, Rfl: 1    lansoprazole (PREVACID) 30 MG delayed release capsule, TAKE ONE CAPSULE BY MOUTH DAILY, Disp: 30 capsule, Rfl: 1    Omega-3 Fatty Acids (FISH OIL) 1000 MG CAPS, Take 2 capsules by mouth 2 times daily, Disp: 90 capsule, Rfl: 3    clopidogrel (PLAVIX) 75 MG tablet, Take 1 tablet by mouth daily, Disp: 90 tablet, Rfl: 3    furosemide (LASIX) 40 MG tablet, Take 1 tablet by mouth as needed (for weight grater than 300lbs), Disp: 90 tablet, Rfl: 3    metoprolol succinate (TOPROL XL) 25 MG extended release tablet, Take 1 tablet by mouth daily, Disp: 30 tablet, Rfl: 3    fluticasone-umeclidin-vilant (TRELEGY ELLIPTA) 100-62.5-25 MCG/INH AEPB, Inhale 1 puff into the lungs daily, Disp: 60 each, Rfl: 5    Cholecalciferol (VITAMIN D3) 1.25 MG (39196 UT) CAPS, 1 po a week, Disp: 4 capsule, Rfl: 5    atorvastatin (LIPITOR) 80 MG tablet, Take 1 tablet by mouth nightly, Disp: 90 tablet, Rfl: 3    albuterol (PROVENTIL) (2.5 MG/3ML) 0.083% nebulizer solution, Take 3 mLs by nebulization every 6 hours as needed for Wheezing or Shortness of Breath, Disp: 360 each, Rfl: 5    albuterol sulfate HFA (PROVENTIL HFA) 108 (90 Base) MCG/ACT inhaler, Inhale 2 puffs into the lungs every 4 hours as needed for Wheezing or Shortness of Breath, Disp: 18 g, Rfl: 5    DULoxetine (CYMBALTA) 30 MG extended release capsule, Take 90 mg by mouth daily, Disp: , Rfl:     hydrOXYzine (VISTARIL) 25 MG capsule, Take 25 mg by mouth in the morning and at bedtime , Disp: , Rfl:     isosorbide mononitrate (IMDUR) 30 MG extended release tablet, Take 2 tablets by mouth daily, Disp: 60 tablet, Rfl: 3    Multiple Vitamins-Minerals (THERAPEUTIC MULTIVITAMIN-MINERALS) tablet, Take 1 tablet by mouth daily, Disp: , Rfl:     traZODone (DESYREL) 100 MG tablet, Take 1 tablet by mouth 2 times daily Am pm, Disp: 60 tablet, Rfl: 5    OXYGEN, Inhale 2 L into the lungs as needed , Disp: , Rfl:     nitroGLYCERIN (NITROSTAT) 0.4 MG SL tablet, Place 1 tablet under the tongue every 5 minutes as needed for Chest pain, Disp: 25 tablet, Rfl: 3    aspirin 81 MG EC tablet, Take 1 tablet by mouth daily, Disp: 30 tablet, Rfl: 0      Objective:   PHYSICAL EXAM:    /80 (Site: Left Upper Arm, Position: Sitting, Cuff Size: Large Adult)   Pulse 81   Resp 16   Ht 5' 4\" (1.626 m)   Wt 300 lb (136.1 kg)   SpO2 93% Comment: RA  BMI 51.49 kg/m²  on  RA   Gen: No distress. Eyes: PERRL. No sclera icterus.  No conjunctival injection. ENT: No discharge. Pharynx clear. Mallampati class IV. Neck: Trachea midline. No obvious mass. Resp: No accessory muscle use. Minimal crackles. No wheezes. No rhonchi. No dullness on percussion. Good air entry. CV: Regular rate. Regular rhythm. No murmur or rub. 1 + LE edema. GI: Non-tender. Non-distended. No hernia. Skin: Warm and dry. No nodule on exposed extremities. Lymph: No cervical LAD. No supraclavicular LAD. M/S: No cyanosis. No joint deformity. No clubbing. Neuro: Awake. Alert. Moves all four extremities. Psych: Oriented x 3. No anxiety. DATA reviewed by me:   PFTs 07/26/2021 FVC 2.82 (70%) FEV1 1.73 (57%) FEV1/FVC 61% TLC 5.40 (87%) DLCO 18.36 (65%)  PFTs 09/30/2020 FVC 2.61 (68%) FEV1 1.96 (66%) FEV1/FVC 75% TLC 6.36 (105%) DLCO 22.12 (81%) 6MW F Res Ex    CT chest 9/18/2020   No PE or aortic dissection  No adenopathy  No infiltrates or effusion    CTPA 4/21/2021  Pulmonary Arteries: Motion artifact degrades image quality. There is no  acute pulmonary thromboembolus. Mediastinum: Status post median sternotomy and CABG. There are no enlarged  thoracic lymph nodes. Lungs/pleura: Secretions layer within the mid trachea. There is no  pneumothorax. There is a trace left pleural effusion bibasilar atelectasis. No change in the multiple 3 mm solid bilateral upper lobe pulmonary nodules,  no follow-up imaging is recommended. Upper Abdomen: Status post cholecystectomy. Soft Tissues/Bones: Degenerative changes involve the thoracic spine. There  is an old healed left rib fracture. Moderate subcutaneous inflammatory stranding is present throughout the left  upper chest wall and axilla with mild associated skin thickening. There is  no drainable fluid collection.     CTPA 9/5/2021   No PE  Mildly dilated thoracic aorta  Post CABG  No mediastinal adenopathy  COPD with no acute abnormalities          LE doppler 4/29/21 negative DVT       TSH 0.9    HST 10/4/2021 AHI 50.9 and desaturation 76%. Saturation below 89% in 300 minutes minutes  CPAP titration 10/19/2021 BiPAP 19/14. BiPAP data reviewed by me. Uses 5-6  hrs/night with 60% compliance and AHI of  0.7. BiPAP 19/14          Assessment:       Moderate COPD  Severe OANH. BiPAP 19/14 cmH2O. Poor compliance. Not willing to use. Dyspnea on exertion-multifactorial due to obesity, deconditioning,  underlying COPD and cardiac conditions. Improving  Bradycardia, AV block, ischemic cardiomyopathy post CABG September 2020 and [facility] 12/22/21 required stents and PCI, [facility] 3/23/22 underwent 5 stens followed by cardiology  45 pack year smoking- Quit March 2020         Plan:       Continue Trelegy and Albuterol INH/Neb Q 4 hrs PRN   Medications refills today   O2 0-2 LPM on exertion. Advised to titrate O2 using her pulse oximeter- target O2 sat 90-92%. Advised to get his Covid and Pneumococcal vaccine   Advised to get influenza vaccine this year   Advised to continue with smoking cessation  CT chest, low dose protocol, screening for lung cancer. Risks, benefits and alternatives including doing nothing were discussed with patient. Discussed with patient alterturnatives treatment options for her OANH, including oral appliances, upper airway surgery and hypoglossal nerve stimulation. Patient prefers no therapy. Complications of OANH if not treated were reviewed with patient and included systemic hypertension, pulmonary hypertension, cardiovascular morbidities, car accidents and all cause mortality. Willing to try O2. Will order overnight pulse ox on RA. Patient verbalized understanding that O2 therapy is not a replacement for CPAP.    Follow up in 6 months or sooner if needed

## 2022-10-13 NOTE — PROGRESS NOTES
Low Dose CT (LDCT) Lung Screening criteria met:     Age 50-77(Medicare) or 50-80 ([facility])   Pack year smoking >20   Still smoking or less than 15 year since quit   No sign or symptoms of lung cancer   > 11 months since last LDCT     Risks and benefits of lung cancer screening with LDCT scans discussed:    Significance of positive screen - False-positive LDCT results often occur. 95% of all positive results do not lead to a diagnosis of cancer. Usually further imaging can resolve most false-positive results; however, some patients may require invasive procedures. Over diagnosis risk - 10% to 12% of screen-detected lung cancer cases are over diagnosed--that is, the cancer would not have been detected in the patient's lifetime without the screening. Need for follow up screens annually to continue lung cancer screening effectiveness     Risks associated with radiation from annual LDCT- Radiation exposure is about the same as for a mammogram, which is about 1/3 of the annual background radiation exposure from everyday life. Starting screening at age 54 is not likely to increase cancer risk from radiation exposure. Patients with comorbidities resulting in life expectancy of < 10 years, or that would preclude treatment of an abnormality identified on CT, should not be screened due to lack of benefit.     To obtain maximal benefit from this screening, smoking cessation and long-term abstinence from smoking is critical

## 2022-10-18 NOTE — PROGRESS NOTES
We received remote transmission from patient's dual chamber pacemaker monitor at home. Transmission shows normal sensing and pacing function. EP physician will review. See interrogation under cardiology tab in the [address] [place] [address] [address] field for more details.     AP 4.7%   32.8%  SVT/ ST noted (Toprol)    End of 91-day monitoring period 10/11/22

## 2022-10-20 ENCOUNTER — NURSE ONLY (OUTPATIENT)
Dept: FAMILY MEDICINE CLINIC | Age: 62
End: 2022-10-20
Payer: COMMERCIAL

## 2022-10-20 DIAGNOSIS — Z23 NEED FOR SHINGLES VACCINE: Primary | ICD-10-CM

## 2022-10-20 DIAGNOSIS — Z23 NEEDS FLU SHOT: ICD-10-CM

## 2022-10-20 PROCEDURE — 90472 IMMUNIZATION ADMIN EACH ADD: CPT | Performed by: NURSE PRACTITIONER

## 2022-10-20 PROCEDURE — 90750 HZV VACC RECOMBINANT IM: CPT | Performed by: NURSE PRACTITIONER

## 2022-10-20 PROCEDURE — 90471 IMMUNIZATION ADMIN: CPT | Performed by: NURSE PRACTITIONER

## 2022-10-20 PROCEDURE — 90674 CCIIV4 VAC NO PRSV 0.5 ML IM: CPT | Performed by: NURSE PRACTITIONER

## 2022-10-21 ENCOUNTER — TELEPHONE (OUTPATIENT)
Dept: PULMONOLOGY | Age: 62
End: 2022-10-21

## 2022-10-21 DIAGNOSIS — J44.9 MODERATE COPD (CHRONIC OBSTRUCTIVE PULMONARY DISEASE) (HCC): Primary | ICD-10-CM

## 2022-10-24 RX ORDER — ONDANSETRON 4 MG/1
TABLET, FILM COATED ORAL
Qty: 30 TABLET | Refills: 0 | Status: SHIPPED | OUTPATIENT
Start: 2022-10-24 | End: 2022-11-21

## 2022-10-28 NOTE — TELEPHONE ENCOUNTER
Spoke with Kymberly Duncan at [employer] repeat ONPO completed they will download and fax to office.

## 2022-11-14 NOTE — PROGRESS NOTES
[facility]   Electrophysiology Outpatient Note              Date:  November 22, 2022  Patient name: Jeromy Gomes  YOB: 1960    Primary Care physician: ANICETO Singh CNP    HISTORY OF PRESENT ILLNESS: The patient is a 63 y.o.  male with a history of SSS, CAD, ICM, CHF, HTN, HLD, OANH, COPD, diverticulitis, obesity, anxiety and chronic respiratory failure. In 9/2020, he was admitted with chest pain. Stress testing was abnormal. He had intermittent 2:1 AV conduction and CHB. LHC showed MVD and he underwent CABG. In 4/2021, he was admitted with chest pain. Stress testing was abnormal. LHC showed 2 out of 3 bypass grafts were occluded. He underwent high risk PCI. While hospitalized, he was noted to have PVC's and intermittent nocturnal CHB and one instance of daytime Mobitz I. He wore a monitor at discharge that showed predominately SR with an average HR of 71 (). PAC burden 0.63%, PVC burden 0.01%, longest pause lasting 3.5 seconds. On 7/23/2021, he had a loop recorded implanted. Device interrogation showed numerous bradycardia/pause events as well as Mobitz I and CHB. On 8/26/2021, he underwent dual chamber pacemaker implant and loop explant. On 9/2/2021, device check showed AP 3.6%,  9.1% and no arrhythmias. In 12/2021, he had a PCI of the D1 with ISREAL x1, rotational arthrectomy of RCA, shockwave coronary lithotripsy of RCA and PCI of RCA with a single ISREAL. He was then referred to the [facility] to see Dr. Claudette Mohair for structural heart disease. In 3/2022, he underwent PCI to OM with ISREAL x 4and PCI to ostial proximal RCA with ISREAL x1 and PCI of the ramus branch (Dr. Claudette Mohair). Today he is being seen for SSS and HTN. He is not feeling well. Reports he has no energy. He is sleeping 20 hours a day. His weight is up. He feels bloated. He wakes up with chest pain. Described as sharp and stabbing. Lasts minutes to hours. Sometimes radiates to arm.  Reports that it feels similar to when he had blockages in the past. He is also complaining of chronic back issues. He has an MRI scheduled for next month. Device check today shows:   Brand: Medtronic  Mode: AAI>DDD  Normal function   3.8% AP  26.9%     Arrhythmias: NSVT x 11 beats   Battery life 13.5 years  RA impedance 513 ohms   RV impedance 513 ohms   RA threshold 0.625 V @ 0.40 ms  RV threshold 0.625 V @ 0.40 ms  RA sensitivity 0.30 mV  RV sensitivity 0.90 mV      Past Medical History:   has a past medical history of Acute diverticulitis, Acute kidney injury ([address] Acute on chronic respiratory failure with hypoxemia ([address] Acute respiratory failure ([address] Acute respiratory failure with hypoxia ([address] Anxiety, Aortic valve calcification, CAD in native artery, Chronic obstructive pulmonary disease (HCC), Chronic systolic congestive heart failure (HCC), Class 3 severe obesity with body mass index (BMI) of 45.0 to 49.9 in [facility]), Coronary artery calcification, Coronary artery disease involving native heart with angina pectoris ([address] Crush injury of right foot, DDD (degenerative disc disease), lumbar, Depression, Diverticulitis of colon, Erectile dysfunction, Fatigue, HNP (herniated nucleus pulposus), lumbar, Hyperglycemia, Hyperlipidemia, Hypersomnia, Hypertension, Insomnia, Ischemic cardiomyopathy, Kidney stone, Lumbar radiculopathy, Lumbar spine pain, LVH (left ventricular hypertrophy), Mobitz type I Wenckebach atrioventricular block, Moderate protein-calorie malnutrition ([address] Observed sleep apnea, OANH (obstructive sleep apnea), Pneumonia, primary atypical, Reactive depression, S/P three vessel coronary artery bypass, Spondylosis without myelopathy or radiculopathy, lumbar region, Tobacco abuse, Tobacco use, and Wears dentures. Past Surgical History:   has a past surgical history that includes Cholecystectomy; Cystoscopy (03/16/2011); Pain management procedure (Right, 12/24/2019);  Pain management procedure (Right, 01/07/2020); Coronary artery bypass graft (N/A, 09/25/2020); Finger amputation (Right, 02/02/2021); Coronary angioplasty with stent (03/2021); Ankle surgery (Left, 08/02/2021); and other surgical history. Home Medications:    Prior to Admission medications    Medication Sig Start Date End Date Taking?  Authorizing Provider   Cholecalciferol (VITAMIN D3) 1.25 MG (55118 UT) CAPS TAKE ONE CAPSULE BY MOUTH ONCE WEEKLY 11/21/22  Yes ANICETO Monique CNP   ondansetron (ZOFRAN) 4 MG tablet TAKE ONE TABLET BY MOUTH DAILY AS NEEDED FOR NAUSEA OR VOMITING 11/21/22  Yes ANICETO Monique CNP   fluticasone-umeclidin-vilant (TRELEGY ELLIPTA) 100-62.5-25 MCG/INH AEPB Inhale 1 puff into the lungs daily 10/13/22  Yes Abdi Elias MD   albuterol (PROVENTIL) (2.5 MG/3ML) 0.083% nebulizer solution Take 3 mLs by nebulization every 6 hours as needed for Wheezing or Shortness of Breath 10/13/22  Yes Abdi Elias MD   albuterol sulfate HFA (PROVENTIL HFA) 108 (90 Base) MCG/ACT inhaler Inhale 2 puffs into the lungs every 4 hours as needed for Wheezing or Shortness of Breath 10/13/22  Yes Abdi Elias MD   benzonatate (TESSALON) 200 MG capsule TAKE ONE CAPSULE BY MOUTH THREE TIMES A DAY AS NEEDED FOR COUGH 8/3/22  Yes Hoang Key MD   lansoprazole (PREVACID) 30 MG delayed release capsule TAKE ONE CAPSULE BY MOUTH DAILY 8/3/22  Yes Hoang Key MD   Omega-3 Fatty Acids (FISH OIL) 1000 MG CAPS Take 2 capsules by mouth 2 times daily 6/24/22  Yes Benita Garrett MD   clopidogrel (PLAVIX) 75 MG tablet Take 1 tablet by mouth daily 5/31/22  Yes Phil Soares DO   furosemide (LASIX) 40 MG tablet Take 1 tablet by mouth as needed (for weight grater than 300lbs) 5/19/22 5/19/23 Yes Phil Soares DO   metoprolol succinate (TOPROL XL) 25 MG extended release tablet Take 1 tablet by mouth daily 5/16/22  Yes ANICETO Dickson - CNP   atorvastatin (LIPITOR) 80 MG tablet Take 1 tablet by mouth nightly 4/13/22  Yes Victoriano Mendoza MD   DULoxetine (CYMBALTA) 30 MG extended release capsule Take 90 mg by mouth daily 3/29/22  Yes Historical Provider, MD   hydrOXYzine (VISTARIL) 25 MG capsule Take 25 mg by mouth in the morning and at bedtime  1/11/22  Yes Historical Provider, MD   isosorbide mononitrate (IMDUR) 30 MG extended release tablet Take 2 tablets by mouth daily 1/19/22  Yes ANICETO Ragsdale CNP   Multiple Vitamins-Minerals (THERAPEUTIC MULTIVITAMIN-MINERALS) tablet Take 1 tablet by mouth daily   Yes Historical Provider, MD   traZODone (DESYREL) 100 MG tablet Take 1 tablet by mouth 2 times daily Am pm 11/15/21  Yes ANICETO Foster CNP   OXYGEN Inhale 2 L into the lungs as needed    Yes Historical Provider, MD   nitroGLYCERIN (NITROSTAT) 0.4 MG SL tablet Place 1 tablet under the tongue every 5 minutes as needed for Chest pain 4/29/21  Yes Victoriano Mendoza MD   aspirin 81 MG EC tablet Take 1 tablet by mouth daily 9/30/20  Yes Ralston Rinne, APRN - CNP       Allergies:  Dilaudid [hydromorphone hcl] and Codeine    Social History:   reports that he quit smoking about 2 years ago. His smoking use included cigarettes. He has a 70.00 pack-year smoking history. He has never used smokeless tobacco. He reports that he does not currently use drugs after having used the following drugs: Marijuana (Weed) and Cocaine. He reports that he does not drink alcohol. Family History: family history includes Heart Disease in his mother; High Blood Pressure in his sister; No Known Problems in his sister; Other in his mother. All 14 point review of systems are completed and pertinent positives are mentioned in the history of present illness. Other systems are reviewed and are negative.      PHYSICAL EXAM:    Vital signs:    /82   Pulse 93   Ht 5' 4\" (1.626 m)   Wt (!) 305 lb (138.3 kg)   SpO2 92%   BMI 52.35 kg/m²      Constitutional and general appearance: alert, cooperative, no distress and appears stated age  [de-identified]: PERRL, no cervical lymphadenopathy. No masses palpable. Normal oral mucosa  Respiratory:  Normal excursion and expansion without use of accessory muscles  Resp auscultation: Normal breath sounds without wheezing, rhonchi, and rales  Cardiovascular: The apical impulse is not displaced  Heart tones are crisp and normal. regular S1 and S2.  Jugular venous pulsation Normal  The carotid upstroke is normal in amplitude and contour without delay or bruit  Peripheral pulses are symmetrical and full   Abdomen:  No masses or tenderness  Bowel sounds present  Extremities:   No cyanosis or clubbing   1+ lower extremity edema   Skin: warm and dry  Neurological:  Alert and oriented  Moves all extremities well  No abnormalities of mood, affect, memory, mentation, or behavior are noted    DATA:    Echo 3/2021:      Conclusions      Summary   Limited echo for left ventricle systolic function. Definity is used for   myocardial border enhancement. LV systolic function is mildly reduced with a visually estimated EF=45-50%. The left ventricle is normal in size with normal wall thickness. More prominent Inferior HK on certain views. Indeterminate diastolic function. Stress 4/2021:   Summary    Large area, reversible, moderate to severe intensity perfusion defect    involving the basal to apical anterolateral, mid to apical anterior and    apex. Mixed ischemia/scar of the inferolateral territory. Decreased wall    motion and myocardial thickening of the above segments. Post-stress LVEF    visually estimated 45-50%. Overall findings represent a high risk scan. Cath 4/19/2021:     [facility] 4/19/21   Left heart catheterization     LVEDP  5      See diagnostic catheterization report for full details, would add that there is severe tortuosity and eccentric plaque within the left main, LAD and D1 which proximal to mid was 75% and distally was 99%.         CONCLUSIONS:      Successful rotational atherectomy and PCI of D1 with single drug-eluting stent  Successful rotational atherectomy and PCI of left main/LAD with single drug-eluting stent  Medical therapy for LCx , this lesion does not appear to be amenable for PCI      Cath 4/2021:  FINDINGS      LVGRAM     LVEDP  6   GRADIENT ACROSS AORTIC VALVE  none   LV FUNCTION EF 60%   WALL MOTION  normal   MITRAL REGURGITATION  mild         CORONARY ARTERIES     LM Proximal less than 10% stenosis, mid-distal haziness with calcification and 70% eccentric stenosis. LAD  Proximal-mid diffuse tubular 80% stenosis, there is mid-distal bidirectional flow noted. Distal vessel is visualized on LIMA angiogram, this shows 60% distal stenosis. D1 is a large vessel with tortuosity noted and ostial/proximal 70% stenosis followed by mid vessel calcification with 50% stenosis and mid-distal 99% stenosis. LCX Medium to large size vessel, proximal 30 to 40% stenosis. OM 3 appears to be the largest OM and is 100% proximal-mid , distal vessel is seen through left to left collaterals and appears to have less than 10% stenosis. RCA Dominant, tortuous, proximal-mid 60 to 70% stenosis. There are moderate right to left collaterals to the circumflex. BYPASS GRAFTS     LIMA-LAD Widely patent with less than 10% [id number] stenosis, the distal LAD has stenosis as noted above.          SVG-D1  100% proximal/mid-distal          SVG-circumflex  100% [id number]             Findings and plans as noted below were discussed with the patient and family, they understand/agree        CONCLUSIONS:      2 out of 3 bypass grafts are occluded, the LIMA to LAD graft is patent  We will consult with CT surgery regarding options for revascularization which potentially may include redo CABG versus high risk PCI of the left main into diagonal  Will order CTA abd/bilat LE w/ runoff, in case cardiac support devices are needed      All labs and testing reviewed. CARDIOLOGY LABS:   CBC:   No results for input(s): WBC, HGB, HCT, PLT in the last 72 hours. BMP:   No results for input(s): NA, K, CO2, BUN, CREATININE, LABGLOM, GLUCOSE in the last 72 hours. PT/INR: No results for input(s): PROTIME, INR in the last 72 hours. APTT:No results for input(s): APTT in the last 72 hours. FASTING LIPID PANEL:  Lab Results   Component Value Date/Time    HDL 28 05/27/2022 09:08 AM    LDLCALC 27 05/27/2022 09:08 AM    TRIG 199 05/27/2022 09:08 AM     LIVER PROFILE:  No results for input(s): AST, ALT, ALB in the last 72 hours. IMPRESSION:      Assessment:   Sick sinus syndrome: stable              - Mobitz I and CHB noted on ILR interrogation 8/4/2021              -s/p dual chamber pacemaker implant on 8/26/2021 (Medtronic)               -device per HPI  S/P loop recorder implant 7/23/2021 with explant 8/26/2021  Chronic combined CHF: volume up on exam today   Ischemic cardiomyopathy with recovered EF              -EF 55% 12/2021, EF 45-50% 3/2021, previously 55-60% 9/2020  CAD:              -s/p CABG x 3 9/2020              -s/p LHC that showed patent LIMA-LAD and occluded vein grafts 4/14/2021              -s/p rotational arthrectomy and PCI with ISREAL to D1 and LM/LAD 4/19/2021   -s/p PCI to OM with ISREAL x 4and PCI to ostial proximal RCA with ISREAL x1 and PCI of the ramus branch (Dr. Dane Rojas) 4/2022  HTN: controlled   HLD  OANH: CPAP compliant  COPD: wears O2 at home PRN   Diverticulitis   History of tobacco abuse   Obesity: diet, exercise, and weight loss is recommended  Chronic respiratory failure  Anxiety   Depression      Plan:   Continue current cardiac medications   Remote device transmissions every three months   Check BMP, BNP, TSH, and CBC   Reviewed fatigue, chest pain and weight gain with Dr. Catracho Naazrio. Pending renal function, will plan to increase Lasix to BID.    Follow up with Dr. Catracho Nazario at next available appointment   Follow up with me in 6 months      MDM high     Irina Kwabena [facility], [address]  [phone]

## 2022-11-20 DIAGNOSIS — E55.9 VITAMIN D DEFICIENCY: ICD-10-CM

## 2022-11-21 RX ORDER — ONDANSETRON 4 MG/1
TABLET, FILM COATED ORAL
Qty: 30 TABLET | Refills: 0 | Status: SHIPPED | OUTPATIENT
Start: 2022-11-21

## 2022-11-21 RX ORDER — CHOLECALCIFEROL (VITAMIN D3) 1250 MCG
CAPSULE ORAL
Qty: 4 CAPSULE | Refills: 5 | Status: SHIPPED | OUTPATIENT
Start: 2022-11-21

## 2022-11-22 ENCOUNTER — NURSE ONLY (OUTPATIENT)
Dept: CARDIOLOGY CLINIC | Age: 62
End: 2022-11-22
Payer: COMMERCIAL

## 2022-11-22 ENCOUNTER — HOSPITAL ENCOUNTER (OUTPATIENT)
Dept: GENERAL RADIOLOGY | Age: 62
Discharge: HOME | End: 2022-11-22
Payer: COMMERCIAL

## 2022-11-22 ENCOUNTER — OFFICE VISIT (OUTPATIENT)
Dept: CARDIOLOGY CLINIC | Age: 62
End: 2022-11-22
Payer: COMMERCIAL

## 2022-11-22 ENCOUNTER — HOSPITAL ENCOUNTER (OUTPATIENT)
Age: 62
Discharge: HOME | End: 2022-11-22
Payer: COMMERCIAL

## 2022-11-22 VITALS
DIASTOLIC BLOOD PRESSURE: 82 MMHG | OXYGEN SATURATION: 92 % | SYSTOLIC BLOOD PRESSURE: 130 MMHG | HEART RATE: 93 BPM | HEIGHT: 64 IN | WEIGHT: 305 LBS | BODY MASS INDEX: 52.07 KG/M2

## 2022-11-22 DIAGNOSIS — R53.83 FATIGUE, UNSPECIFIED TYPE: ICD-10-CM

## 2022-11-22 DIAGNOSIS — I44.1 MOBITZ TYPE I WENCKEBACH ATRIOVENTRICULAR BLOCK: ICD-10-CM

## 2022-11-22 DIAGNOSIS — I49.5 SSS (SICK SINUS SYNDROME) (HCC): ICD-10-CM

## 2022-11-22 DIAGNOSIS — Z95.0 PACEMAKER: ICD-10-CM

## 2022-11-22 DIAGNOSIS — I10 ESSENTIAL HYPERTENSION: ICD-10-CM

## 2022-11-22 DIAGNOSIS — R00.1 SINUS BRADYCARDIA: ICD-10-CM

## 2022-11-22 DIAGNOSIS — I50.42 CHRONIC COMBINED SYSTOLIC AND DIASTOLIC CONGESTIVE HEART FAILURE (HCC): ICD-10-CM

## 2022-11-22 DIAGNOSIS — I44.2 CHB (COMPLETE HEART BLOCK) (HCC): ICD-10-CM

## 2022-11-22 DIAGNOSIS — I49.5 SSS (SICK SINUS SYNDROME) (HCC): Primary | ICD-10-CM

## 2022-11-22 DIAGNOSIS — I25.5 ISCHEMIC CARDIOMYOPATHY: ICD-10-CM

## 2022-11-22 DIAGNOSIS — I46.9 ASYSTOLE (HCC): Primary | ICD-10-CM

## 2022-11-22 DIAGNOSIS — M48.062 SPINAL STENOSIS, LUMBAR REGION WITH NEUROGENIC CLAUDICATION: ICD-10-CM

## 2022-11-22 LAB
ANION GAP SERPL CALCULATED.3IONS-SCNC: 8 MMOL/L (ref 3–16)
BASOPHILS ABSOLUTE: 0.1 K/UL (ref 0–0.2)
BASOPHILS RELATIVE PERCENT: 0.8 %
BUN BLDV-MCNC: 17 MG/DL (ref 7–20)
CALCIUM SERPL-MCNC: 9.5 MG/DL (ref 8.3–10.6)
CHLORIDE BLD-SCNC: 98 MMOL/L (ref 99–110)
CO2: 33 MMOL/L (ref 21–32)
CREAT SERPL-MCNC: 0.8 MG/DL (ref 0.8–1.3)
EOSINOPHILS ABSOLUTE: 0.4 K/UL (ref 0–0.6)
EOSINOPHILS RELATIVE PERCENT: 3.6 %
GFR SERPL CREATININE-BSD FRML MDRD: >60 ML/MIN/{1.73_M2}
GLUCOSE BLD-MCNC: 99 MG/DL (ref 70–99)
HCT VFR BLD CALC: 45.6 % (ref 40.5–52.5)
HEMOGLOBIN: 15 G/DL (ref 13.5–17.5)
LYMPHOCYTES ABSOLUTE: 1.5 K/UL (ref 1–5.1)
LYMPHOCYTES RELATIVE PERCENT: 13.3 %
MCH RBC QN AUTO: 28.2 PG (ref 26–34)
MCHC RBC AUTO-ENTMCNC: 32.8 G/DL (ref 31–36)
MCV RBC AUTO: 86.1 FL (ref 80–100)
MONOCYTES ABSOLUTE: 0.9 K/UL (ref 0–1.3)
MONOCYTES RELATIVE PERCENT: 7.8 %
NEUTROPHILS ABSOLUTE: 8.6 K/UL (ref 1.7–7.7)
NEUTROPHILS RELATIVE PERCENT: 74.5 %
PDW BLD-RTO: 15.4 % (ref 12.4–15.4)
PLATELET # BLD: 182 K/UL (ref 135–450)
PMV BLD AUTO: 7.4 FL (ref 5–10.5)
POTASSIUM SERPL-SCNC: 4.3 MMOL/L (ref 3.5–5.1)
PRO-BNP: 54 PG/ML (ref 0–124)
RBC # BLD: 5.3 M/UL (ref 4.2–5.9)
SODIUM BLD-SCNC: 139 MMOL/L (ref 136–145)
TSH REFLEX FT4: 1.24 UIU/ML (ref 0.27–4.2)
WBC # BLD: 11.6 K/UL (ref 4–11)

## 2022-11-22 PROCEDURE — 83880 ASSAY OF NATRIURETIC PEPTIDE: CPT

## 2022-11-22 PROCEDURE — 72110 X-RAY EXAM L-2 SPINE 4/>VWS: CPT

## 2022-11-22 PROCEDURE — 3074F SYST BP LT 130 MM HG: CPT | Performed by: NURSE PRACTITIONER

## 2022-11-22 PROCEDURE — 99215 OFFICE O/P EST HI 40 MIN: CPT | Performed by: NURSE PRACTITIONER

## 2022-11-22 PROCEDURE — G8482 FLU IMMUNIZE ORDER/ADMIN: HCPCS | Performed by: NURSE PRACTITIONER

## 2022-11-22 PROCEDURE — G8417 CALC BMI ABV UP PARAM F/U: HCPCS | Performed by: NURSE PRACTITIONER

## 2022-11-22 PROCEDURE — 3078F DIAST BP <80 MM HG: CPT | Performed by: NURSE PRACTITIONER

## 2022-11-22 PROCEDURE — 84443 ASSAY THYROID STIM HORMONE: CPT

## 2022-11-22 PROCEDURE — 85025 COMPLETE CBC W/AUTO DIFF WBC: CPT

## 2022-11-22 PROCEDURE — 93280 PM DEVICE PROGR EVAL DUAL: CPT | Performed by: INTERNAL MEDICINE

## 2022-11-22 PROCEDURE — G8427 DOCREV CUR MEDS BY ELIG CLIN: HCPCS | Performed by: NURSE PRACTITIONER

## 2022-11-22 PROCEDURE — 80048 BASIC METABOLIC PNL TOTAL CA: CPT

## 2022-11-22 PROCEDURE — 3017F COLORECTAL CA SCREEN DOC REV: CPT | Performed by: NURSE PRACTITIONER

## 2022-11-22 PROCEDURE — 1036F TOBACCO NON-USER: CPT | Performed by: NURSE PRACTITIONER

## 2022-11-22 PROCEDURE — 36415 COLL VENOUS BLD VENIPUNCTURE: CPT

## 2022-11-22 NOTE — PROGRESS NOTES
Patient presents to the device clinic today for a programming evaluation for his pacemaker. Patient has a history of CHB, ICM, Mobitz I, sinus bradycardia, and SSS. Takes Plavix and Toprol XL. Last device interrogation was on 10/11. Since then, 1 NSVT event recorded x 11 beats, preceded by PVCs. P wave: 3.8 mV  R wave: 19.5 mV    AP 3.8%  26.9%    All sensing and pacing parameters are within normal range. No changes need to be made at this time. Patient will see ARNAUD Hamilton in office today. Patient education was provided about device functionality, in home monitoring, and any other patient questions and/or concerns were addressed. Patient voices understanding. Patient will follow up in 3 months in office or remotely. Please see interrogation for more detail - Paceart report located under the Cardiology tab.
Detail Level: Detailed
Detail Level: Zone

## 2022-11-22 NOTE — PATIENT INSTRUCTIONS
Update labs     Remote device transmissions every three months     Continue to monitor weight     I will talk to Dr. Katina Cochran and call you with his recommendations       Schedule follow up with me in 6 months     Follow up with Dr. Katina Cochran in December or January

## 2022-11-23 ENCOUNTER — OFFICE VISIT (OUTPATIENT)
Dept: FAMILY MEDICINE CLINIC | Age: 62
End: 2022-11-23
Payer: COMMERCIAL

## 2022-11-23 VITALS
BODY MASS INDEX: 52.07 KG/M2 | HEART RATE: 86 BPM | SYSTOLIC BLOOD PRESSURE: 120 MMHG | DIASTOLIC BLOOD PRESSURE: 72 MMHG | OXYGEN SATURATION: 94 % | HEIGHT: 64 IN | WEIGHT: 305 LBS

## 2022-11-23 DIAGNOSIS — G25.81 RESTLESS LEG: ICD-10-CM

## 2022-11-23 DIAGNOSIS — E66.01 MORBID OBESITY WITH BODY MASS INDEX (BMI) OF 50.0 TO 59.9 IN ADULT (HCC): Primary | ICD-10-CM

## 2022-11-23 DIAGNOSIS — F32.9 REACTIVE DEPRESSION: ICD-10-CM

## 2022-11-23 PROCEDURE — 3017F COLORECTAL CA SCREEN DOC REV: CPT | Performed by: NURSE PRACTITIONER

## 2022-11-23 PROCEDURE — 36415 COLL VENOUS BLD VENIPUNCTURE: CPT | Performed by: NURSE PRACTITIONER

## 2022-11-23 PROCEDURE — G8417 CALC BMI ABV UP PARAM F/U: HCPCS | Performed by: NURSE PRACTITIONER

## 2022-11-23 PROCEDURE — G8482 FLU IMMUNIZE ORDER/ADMIN: HCPCS | Performed by: NURSE PRACTITIONER

## 2022-11-23 PROCEDURE — G8427 DOCREV CUR MEDS BY ELIG CLIN: HCPCS | Performed by: NURSE PRACTITIONER

## 2022-11-23 PROCEDURE — 1036F TOBACCO NON-USER: CPT | Performed by: NURSE PRACTITIONER

## 2022-11-23 PROCEDURE — 99214 OFFICE O/P EST MOD 30 MIN: CPT | Performed by: NURSE PRACTITIONER

## 2022-11-23 RX ORDER — ROPINIROLE 0.5 MG/1
0.5 TABLET, FILM COATED ORAL NIGHTLY
Qty: 30 TABLET | Refills: 1 | Status: SHIPPED | OUTPATIENT
Start: 2022-11-23

## 2022-11-23 SDOH — ECONOMIC STABILITY: FOOD INSECURITY: WITHIN THE PAST 12 MONTHS, YOU WORRIED THAT YOUR FOOD WOULD RUN OUT BEFORE YOU GOT MONEY TO BUY MORE.: NEVER TRUE

## 2022-11-23 SDOH — ECONOMIC STABILITY: FOOD INSECURITY: WITHIN THE PAST 12 MONTHS, THE FOOD YOU BOUGHT JUST DIDN'T LAST AND YOU DIDN'T HAVE MONEY TO GET MORE.: NEVER TRUE

## 2022-11-23 ASSESSMENT — PATIENT HEALTH QUESTIONNAIRE - PHQ9
SUM OF ALL RESPONSES TO PHQ QUESTIONS 1-9: 8
10. IF YOU CHECKED OFF ANY PROBLEMS, HOW DIFFICULT HAVE THESE PROBLEMS MADE IT FOR YOU TO DO YOUR WORK, TAKE CARE OF THINGS AT HOME, OR GET ALONG WITH OTHER PEOPLE: 2
SUM OF ALL RESPONSES TO PHQ9 QUESTIONS 1 & 2: 2
9. THOUGHTS THAT YOU WOULD BE BETTER OFF DEAD, OR OF HURTING YOURSELF: 0
7. TROUBLE CONCENTRATING ON THINGS, SUCH AS READING THE NEWSPAPER OR WATCHING TELEVISION: 0
3. TROUBLE FALLING OR STAYING ASLEEP: 2
8. MOVING OR SPEAKING SO SLOWLY THAT OTHER PEOPLE COULD HAVE NOTICED. OR THE OPPOSITE, BEING SO FIGETY OR RESTLESS THAT YOU HAVE BEEN MOVING AROUND A LOT MORE THAN USUAL: 1
6. FEELING BAD ABOUT YOURSELF - OR THAT YOU ARE A FAILURE OR HAVE LET YOURSELF OR YOUR FAMILY DOWN: 0
SUM OF ALL RESPONSES TO PHQ QUESTIONS 1-9: 8
1. LITTLE INTEREST OR PLEASURE IN DOING THINGS: 0
4. FEELING TIRED OR HAVING LITTLE ENERGY: 2
SUM OF ALL RESPONSES TO PHQ QUESTIONS 1-9: 8
5. POOR APPETITE OR OVEREATING: 1
SUM OF ALL RESPONSES TO PHQ QUESTIONS 1-9: 8
2. FEELING DOWN, DEPRESSED OR HOPELESS: 2

## 2022-11-23 ASSESSMENT — ANXIETY QUESTIONNAIRES
7. FEELING AFRAID AS IF SOMETHING AWFUL MIGHT HAPPEN: 1
IF YOU CHECKED OFF ANY PROBLEMS ON THIS QUESTIONNAIRE, HOW DIFFICULT HAVE THESE PROBLEMS MADE IT FOR YOU TO DO YOUR WORK, TAKE CARE OF THINGS AT HOME, OR GET ALONG WITH OTHER PEOPLE: NOT DIFFICULT AT ALL
2. NOT BEING ABLE TO STOP OR CONTROL WORRYING: 3
3. WORRYING TOO MUCH ABOUT DIFFERENT THINGS: 3
1. FEELING NERVOUS, ANXIOUS, OR ON EDGE: 3
5. BEING SO RESTLESS THAT IT IS HARD TO SIT STILL: 3
GAD7 TOTAL SCORE: 19
4. TROUBLE RELAXING: 3
6. BECOMING EASILY ANNOYED OR IRRITABLE: 3

## 2022-11-23 ASSESSMENT — SOCIAL DETERMINANTS OF HEALTH (SDOH): HOW HARD IS IT FOR YOU TO PAY FOR THE VERY BASICS LIKE FOOD, HOUSING, MEDICAL CARE, AND HEATING?: NOT HARD AT ALL

## 2022-11-23 NOTE — PROGRESS NOTES
[address] [facility]  [address] [facility] [address]  Dept: [phone]  Dept Fax: [phone]  Loc: [phone]    Shahram Garcia is a 63 y.o. male who presents today for his medical conditions/complaints as noted below. Shahram Garcia is c/o of Anxiety, Depression, and Insomnia       Subjective:     Chief Complaint   Patient presents with    Anxiety    Depression    Insomnia       HPI  Restless Leg Syndrome  Patient has history of RLS with initial complaints of cramps and spasms in the lower extremities, especially at rest and at night. He reported waking up several times a night due to pain and cramping in the lower extremities that was relieved with walking. He denied any complaint during activities. These symptoms have been going on for quite sometime. He is currently on  nothing. Treatment has been ineffective  Only sleeping a few hours a night    Mood Disorder:  Patient presents for follow-up of depression and anxiety disorder. Current complaints include: See PHQ9 below . He denies tearfulness, decreased libido, irritability, excessive worry, panic attacks, obsessive thoughts, compulsive behaviors, increased use of drugs or alcohol, suicidal thoughts or behavior, and impaired memory. Symptoms/signs of lev: none. External stressors: nothing new. Current treatment includes: Cymbalta- Patient quit taking and it is unclear if patient is using trazodone at bedtime as prescribed. He also quit taking hydroxyzine  He also states he quit seeing a counselor the patient also states that he quit going to the psychiatrist several months ago and this is when he came off of his medications.     PHQ-9  11/23/2022 6/29/2022 11/11/2021 3/1/2021 6/26/2020 1/6/2020 9/25/2019   Little interest or pleasure in doing things 0 2 3 3 3 3 3   Feeling down, depressed, or hopeless 2 1 3 3 1 2 2   Trouble falling or staying asleep, or sleeping too much 2 2 3 3 3 3 2 Feeling tired or having little energy 2 3 3 3 3 2 3   Poor appetite or overeating 1 3 - 0 0 3 2   Feeling bad about yourself - or that you are a failure or have let yourself or your family down 0 0 0 0 0 0 1   Trouble concentrating on things, such as reading the newspaper or watching television 0 3 3 3 0 2 1   Moving or speaking so slowly that other people could have noticed. Or the opposite - being so fidgety or restless that you have been moving around a lot more than usual 1 3 3 0 3 2 3   Thoughts that you would be better off dead, or of hurting yourself in some way 0 0 0 0 0 0 0   PHQ-2 Score 2 3 6 6 4 5 5   PHQ-9 Total Score 8 17 18 15 13 17 17   If you checked off any problems, how difficult have these problems made it for you to do your work, take care of things at home, or get along with other people?  2 2 2 2 1 2 2     Interpretation of Total Score Total Score Depression Severity: 1-4 = Minimal depression, 5-9 = Mild depression, 10-14 = Moderate depression, 15-19 = Moderately severe depression, 20-27 = Severe depression      Past Medical History:   Diagnosis Date    Acute diverticulitis 04/10/2021    Acute kidney injury ([address] 04/08/2021    Acute on chronic respiratory failure with hypoxemia (HCC)     Acute respiratory failure ([address] 08/19/2020    Acute respiratory failure with hypoxia ([address]     Anxiety 01/06/2020    Aortic valve calcification 09/2020    seen on lung CT    CAD in native artery 04/14/2021    Chronic obstructive pulmonary disease ([address] 09/03/2019    PFT done 9/03/19    Chronic systolic congestive heart failure ([address] 04/10/2021    Class 3 severe obesity with body mass index (BMI) of 45.0 to 49.9 in adult [facility])     Coronary artery calcification 09/2020    seen on lung ct    Coronary artery disease involving native heart with angina pectoris ([address] 09/22/2020    Crush injury of right foot 07/23/2015    DDD (degenerative disc disease), lumbar 01/06/2020    Depression 01/06/2020    Diverticulitis of colon 04/10/2021    Erectile dysfunction 01/06/2020    Fatigue 01/06/2020    HNP (herniated nucleus pulposus), lumbar 01/06/2020    Hyperglycemia 01/06/2020    Hyperlipidemia     Hypersomnia 01/06/2020    Hypertension     Insomnia     Ischemic cardiomyopathy     Kidney stone     Lumbar radiculopathy 01/06/2020    Lumbar spine pain 01/06/2020    LVH (left ventricular hypertrophy)     seen on echo 8/2020, moderate    Mobitz type I Wenckebach atrioventricular block 04/10/2021    Moderate protein-calorie malnutrition ([facility] 03/17/2020    Observed sleep apnea 01/06/2020    OANH (obstructive sleep apnea) 01/06/2020    no C-pap is getting tested    Pneumonia, primary atypical     Reactive depression 01/06/2020    S/P three vessel coronary artery bypass 10/26/2020    Spondylosis without myelopathy or radiculopathy, lumbar region 01/06/2020    Tobacco abuse 01/06/2020    Tobacco use 01/06/2020    Wears dentures     full set         Review of Systems   Constitutional:  Positive for fatigue. Negative for appetite change and unexpected weight change. HENT: Negative. Eyes: Negative. Respiratory:  Positive for shortness of breath. Cardiovascular:  Positive for leg swelling. Negative for chest pain and palpitations. Gastrointestinal: Negative. Negative for abdominal pain, anal bleeding, blood in stool and nausea. Endocrine: Negative. Genitourinary: Negative. Negative for hematuria. Musculoskeletal:  Positive for myalgias. Skin: Negative. Negative for rash. Allergic/Immunologic: Negative. Neurological: Negative. Negative for dizziness, syncope, light-headedness and numbness. Hematological: Negative. Does not bruise/bleed easily. Psychiatric/Behavioral:  Positive for dysphoric mood and sleep disturbance. All other systems reviewed and are negative.      Current Outpatient Medications   Medication Sig Dispense Refill    Cholecalciferol (VITAMIN D3) 1.25 MG (41766 UT) CAPS TAKE ONE CAPSULE BY MOUTH ONCE WEEKLY 4 capsule 5    ondansetron (ZOFRAN) 4 MG tablet TAKE ONE TABLET BY MOUTH DAILY AS NEEDED FOR NAUSEA OR VOMITING 30 tablet 0    fluticasone-umeclidin-vilant (TRELEGY ELLIPTA) 100-62.5-25 MCG/INH AEPB Inhale 1 puff into the lungs daily 3 each 1    albuterol (PROVENTIL) (2.5 MG/3ML) 0.083% nebulizer solution Take 3 mLs by nebulization every 6 hours as needed for Wheezing or Shortness of Breath 360 each 5    albuterol sulfate HFA (PROVENTIL HFA) 108 (90 Base) MCG/ACT inhaler Inhale 2 puffs into the lungs every 4 hours as needed for Wheezing or Shortness of Breath 3 each 1    benzonatate (TESSALON) 200 MG capsule TAKE ONE CAPSULE BY MOUTH THREE TIMES A DAY AS NEEDED FOR COUGH 30 capsule 1    lansoprazole (PREVACID) 30 MG delayed release capsule TAKE ONE CAPSULE BY MOUTH DAILY 30 capsule 1    Omega-3 Fatty Acids (FISH OIL) 1000 MG CAPS Take 2 capsules by mouth 2 times daily 90 capsule 3    clopidogrel (PLAVIX) 75 MG tablet Take 1 tablet by mouth daily 90 tablet 3    furosemide (LASIX) 40 MG tablet Take 1 tablet by mouth as needed (for weight grater than 300lbs) 90 tablet 3    metoprolol succinate (TOPROL XL) 25 MG extended release tablet Take 1 tablet by mouth daily 30 tablet 3    atorvastatin (LIPITOR) 80 MG tablet Take 1 tablet by mouth nightly 90 tablet 3    DULoxetine (CYMBALTA) 30 MG extended release capsule Take 90 mg by mouth daily      hydrOXYzine (VISTARIL) 25 MG capsule Take 25 mg by mouth in the morning and at bedtime       isosorbide mononitrate (IMDUR) 30 MG extended release tablet Take 2 tablets by mouth daily 60 tablet 3    Multiple Vitamins-Minerals (THERAPEUTIC MULTIVITAMIN-MINERALS) tablet Take 1 tablet by mouth daily      traZODone (DESYREL) 100 MG tablet Take 1 tablet by mouth 2 times daily Am pm 60 tablet 5    OXYGEN Inhale 2 L into the lungs as needed       nitroGLYCERIN (NITROSTAT) 0.4 MG SL tablet Place 1 tablet under the tongue every 5 minutes as needed for Chest pain 25 tablet 3 aspirin 81 MG EC tablet Take 1 tablet by mouth daily 30 tablet 0     No current facility-administered medications for this visit. No changes in past medical history, past surgical history, social history, orfamily history were noted during the patient encounter unless specifically listed above. All updates of past medical history, past surgical history, social history, or family history were reviewed personally by me duringthe office visit. All problems listed in the assessment are stable unless noted otherwise. Medication profile reviewed personally by me during the office visit. Medication side effects and possible impairments frommedications were discussed as applicable. Objective:     Physical Exam  Vitals and nursing note reviewed. Constitutional:       General: He is not in acute distress. Appearance: Normal appearance. He is well-developed. He is obese. HENT:      Head: Normocephalic and atraumatic. Right Ear: Tympanic membrane, ear canal and external ear normal.      Left Ear: Tympanic membrane, ear canal and external ear normal.      Nose: Nose normal.      Mouth/Throat:      Lips: Pink. Mouth: Mucous membranes are moist.   Eyes:      General: Lids are normal.      Extraocular Movements: Extraocular movements intact. Conjunctiva/sclera: Conjunctivae normal.      Pupils: Pupils are equal, round, and reactive to light. Neck:      Thyroid: No thyromegaly. Vascular: No carotid bruit or JVD. Trachea: Trachea normal.   Cardiovascular:      Rate and Rhythm: Normal rate and regular rhythm. Pulses: Normal pulses. Radial pulses are 2+ on the right side and 2+ on the left side. Dorsalis pedis pulses are 2+ on the right side and 2+ on the left side. Posterior tibial pulses are 2+ on the right side and 2+ on the left side. Heart sounds: Normal heart sounds. No murmur heard. No friction rub. No gallop.    Pulmonary:      Effort: Pulmonary effort is normal. No respiratory distress. Breath sounds: Normal breath sounds. Chest:      Chest wall: No deformity. Abdominal:      General: Bowel sounds are normal. There is no distension. Palpations: Abdomen is soft. There is no hepatomegaly, splenomegaly or mass. Tenderness: There is no abdominal tenderness. Musculoskeletal:         General: Normal range of motion. Cervical back: Normal range of motion and neck supple. Right lower leg: Edema present. Left lower leg: Edema present. Lymphadenopathy:      Head:      Right side of head: No submandibular adenopathy. Left side of head: No submandibular adenopathy. Cervical: No cervical adenopathy. Skin:     General: Skin is warm and dry. Findings: No lesion or rash. Neurological:      Mental Status: He is alert and oriented to person, place, and time. Motor: Motor function is intact. Coordination: Coordination is intact. Gait: Gait normal.      Deep Tendon Reflexes: Reflexes are normal and symmetric. Psychiatric:         Attention and Perception: Attention and perception normal.         Mood and Affect: Mood and affect normal.         Speech: Speech normal.         Behavior: Behavior normal. Behavior is cooperative. Thought Content: Thought content normal.         Cognition and Memory: Cognition normal.         Judgment: Judgment normal.       /72 (Site: Left Upper Arm, Position: Sitting, Cuff Size: Large Adult)   Pulse 86   Ht 5' 4\" (1.626 m)   Wt (!) 305 lb (138.3 kg)   SpO2 94%   BMI 52.35 kg/m²   Body mass index is 52.35 kg/m².     BP Readings from Last 2 Encounters:   11/23/22 120/72   11/22/22 130/82       Wt Readings from Last 3 Encounters:   11/23/22 (!) 305 lb (138.3 kg)   11/22/22 (!) 305 lb (138.3 kg)   10/13/22 300 lb (136.1 kg)       Lab Review   Hospital Outpatient Visit on 11/22/2022   Component Date Value    Pro-BNP 11/22/2022 54     TSH Reflex FT4 11/22/2022 1.24     Sodium 11/22/2022 139     Potassium 11/22/2022 4.3     Chloride 11/22/2022 98 (A)     CO2 11/22/2022 33 (A)     Anion Gap 11/22/2022 8     Glucose 11/22/2022 99     BUN 11/22/2022 17     Creatinine 11/22/2022 0.8     Est, Glom Filt Rate 11/22/2022 >60     Calcium 11/22/2022 9.5     WBC 11/22/2022 11.6 (A)     RBC 11/22/2022 5.30     Hemoglobin 11/22/2022 15.0     Hematocrit 11/22/2022 45.6     MCV 11/22/2022 86.1     MCH 11/22/2022 28.2     MCHC 11/22/2022 32.8     RDW 11/22/2022 15.4     Platelets 54/00/1403 182     MPV 11/22/2022 7.4     Neutrophils % 11/22/2022 74.5     Lymphocytes % 11/22/2022 13.3     Monocytes % 11/22/2022 7.8     Eosinophils % 11/22/2022 3.6     Basophils % 11/22/2022 0.8     Neutrophils Absolute 11/22/2022 8.6 (A)     Lymphocytes Absolute 11/22/2022 1.5     Monocytes Absolute 11/22/2022 0.9     Eosinophils Absolute 11/22/2022 0.4     Basophils Absolute 11/22/2022 0.1        No results found for this visit on 11/23/22. Assessment:       1. Morbid obesity with body mass index (BMI) of 50.0 to 59.9 in [facility])    2. Restless leg        No results found for this visit on 11/23/22. Plan:       Severo Heller was seen today for anxiety, depression and insomnia. Diagnoses and all orders for this visit:    Morbid obesity with body mass index (BMI) of 50.0 to 59.9 in [facility])  -     Teodoro Paredes MD, Bariatric Surgery, [facility]    Restless leg  -     Vitamin B12 & Folate  -     Ferritin  -     Magnesium  -     rOPINIRole (REQUIP) 0.5 MG tablet; Take 1 tablet by mouth at bedtime    Reactive depression  He also states he quit seeing a counselor the patient also states that he quit going to the psychiatrist several months ago and this is when he came off of his medications. The patient states that he cannot sleep. It appears he quit taking his trazodone for about 2 months but has been back on it for 1 month.   He still states he cannot sleep. He is not using his CPAP machine. He states that he cannot breathe when he wears it. I did discuss the possibility of the patient's restlessness and inability to sleep was because he is not using his CPAP machine. Also explained the correlation between restless leg syndrome and sleep apnea. The patient feels that most of his problem is the restless leg syndrome and feels that if he can be put on medication then he will sleep well. We will do a trial of Requip. Also labs as noted above to make sure there is no other cause for his restless leg symptoms. The patient also is concerned with some memory issues however he is not sure if it is truly a memory issue or if it is from the lack of sleep. He states that his mother grandmother and 2 aunts all had dementia  We will have the patient follow-up in 4 to 6 weeks and if he is not sleeping better and his restless leg symptoms have not been better controlled and he still having memory issues then an MMSE will be performed    Patient has been instructed call the office immediately with new symptoms, change in symptoms or worseningof symptoms. If this is not feasible, patient is instructed to report to the emergency room. Medication profile reviewed. Medication side effects and possible impairments from medications were discussed as applicable. Allergies were reviewed. Health maintenance was reviewed and updated as appropriate. (Comment: Please note this report has been produced using a combination of typing and speech recognition software and may contain errors related to that system including errors in grammar, punctuation, and spelling, as well as words and phrases that may be inappropriate.  If there are any questions or concerns please feel free to contact the dictating provider for clarification.)

## 2022-11-24 LAB
FERRITIN: 55.8 NG/ML (ref 30–400)
FOLATE: 11.65 NG/ML (ref 4.78–24.2)
MAGNESIUM: 2 MG/DL (ref 1.8–2.4)
VITAMIN B-12: 497 PG/ML (ref 211–911)

## 2022-11-28 ASSESSMENT — ENCOUNTER SYMPTOMS
GASTROINTESTINAL NEGATIVE: 1
NAUSEA: 0
ALLERGIC/IMMUNOLOGIC NEGATIVE: 1
SHORTNESS OF BREATH: 1
ANAL BLEEDING: 0
EYES NEGATIVE: 1
BLOOD IN STOOL: 0
ABDOMINAL PAIN: 0

## 2022-12-01 NOTE — PROGRESS NOTES
CARDIOLOGY FOLLOW-UP VISIT        Patient Name: Roe Hart  Primary Care physician: ANICETO Acosta CNP  Reason for Referral/Chief complaint: Complex coronary artery disease with recurrent PCI    Subjective:     Roe Hart is a pleasant 63 y.o. man with prior history notable for obstructive sleep apnea noncompliant with CPAP previously, Mobitz I with 2:1, congestive heart failure with ischemic cardiomyopathy, and complex coronary artery disease, CABG x 3 and multiple prior PCI.    9/25/20 he underwent three-vessel bypass with Dr. Priscilla Rizzo, receiving a LIMA to LAD, SVG to OM and SVG to diagonal. Patient's course complicated with vein graft failure. He underwent LHC on 4/14/21 which noted patent LIMA-LAD and occluded vein grafts. On 4/19/21, he underwent rotational atherectomy of the left main, LAD and first diagonal with PCI/ISREAL to D1 as well as LM/LAD. Loop/Device check 8/4/2021 showed numerous pauses. EGMs showed CHB. Patient underwent medtronic dual PPM implant 8/26/21 by Dr. Mike Leyva. LHC with Dr. Melinda Alvarez on 12/22/2021 Successful PCI of D1 with single drug-eluting stent. Successful rotational atherectomy of RCA Successful shockwave coronary lithotripsy of RCA. Successful PCI of RCA with single drug-eluting stent. Consider staged PCI of LIMA to LAD. Consider referral to [facility] Dr Yesi Hutchison for consideration of LCx  PCI. Admitted 3/23-3/25/22 [facility] for CAD with unstable angina pectoris and subsequently underwent  PCI to OM with DESx4 and PCI to ostial proximal RCA with DESx1 and PCI Ramus branch ISR per Dr. Hemal Jim and Dr. Cirilo Hussein. At discharge was instructed to stop metoprolol succinate 25 mg. Last OV 5/6/22 doing fairly well since his most recent PCI with Dr. Yesi Hutchison at [facility].  He had no angina. .  Not any worse since start of beta-blocker he states. He had stable shortness of breath that was not limiting his activity and rehab.       Today, he reports feeling bad again. He states Dr. Chuck Payne has increased his O2 to 4L at night. He does not use a C-PAP. He states he is very short of breath, especially with exertion. He also is having chest pain with and without exertion. States it is a tightness across his chest and it also wakes him up every couple hours. He sleeps flat on his side. Wakes up on occasion struggling to breathe. He does report getting dizzy. This is a chronic issue. No improvement following pacemaker placement. He has not had to take any nitroglycerin. States pain usually subsides when he rests. Denies palpitations, near-syncope or nancy syncope. Denies increasing lower extremity edema or weight gain. Weight does appear up 10 pounds from June. 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 Sedentary. The patient is compliant with medications. Cost of medications is affordable. No endorsed side effects. Home Medications:  Were reviewed and are listed in nursing record and/or below  Prior to Admission medications    Medication Sig Start Date End Date Taking?  Authorizing Provider   rOPINIRole (REQUIP) 0.5 MG tablet Take 1 tablet by mouth at bedtime 11/23/22  Yes ANICETO Belle CNP   Cholecalciferol (VITAMIN D3) 1.25 MG (30671 UT) CAPS TAKE ONE CAPSULE BY MOUTH ONCE WEEKLY 11/21/22  Yes ANICETO Belle CNP   ondansetron (ZOFRAN) 4 MG tablet TAKE ONE TABLET BY MOUTH DAILY AS NEEDED FOR NAUSEA OR VOMITING 11/21/22  Yes ANICETO Belle CNP   fluticasone-umeclidin-vilant (TRELEGY ELLIPTA) 100-62.5-25 MCG/INH AEPB Inhale 1 puff into the lungs daily 10/13/22  Yes Vero Torres MD   albuterol (PROVENTIL) (2.5 MG/3ML) 0.083% nebulizer solution Take 3 mLs by nebulization every 6 hours as needed for Wheezing or Shortness of Breath 10/13/22  Yes Vero Torres MD   albuterol sulfate HFA (PROVENTIL HFA) 108 (90 Base) MCG/ACT inhaler Inhale 2 puffs into the lungs every 4 hours as needed for Wheezing or Shortness of Breath 10/13/22  Yes Kiana Monzon MD   benzonatate (TESSALON) 200 MG capsule TAKE ONE CAPSULE BY MOUTH THREE TIMES A DAY AS NEEDED FOR COUGH 8/3/22  Yes Puja Del Cid MD   lansoprazole (PREVACID) 30 MG delayed release capsule TAKE ONE CAPSULE BY MOUTH DAILY 8/3/22  Yes Puja Del Cid MD   Omega-3 Fatty Acids (FISH OIL) 1000 MG CAPS Take 2 capsules by mouth 2 times daily 6/24/22  Yes Khadijah Grayson MD   clopidogrel (PLAVIX) 75 MG tablet Take 1 tablet by mouth daily 5/31/22  Yes Phil Soares DO   furosemide (LASIX) 40 MG tablet Take 1 tablet by mouth as needed (for weight grater than 300lbs)  Patient taking differently: Take 40 mg by mouth 2 times daily 5/19/22 5/19/23 Yes Phil Soares DO   metoprolol succinate (TOPROL XL) 25 MG extended release tablet Take 1 tablet by mouth daily 5/16/22  Yes Irina Mendez APRN - CNP   atorvastatin (LIPITOR) 80 MG tablet Take 1 tablet by mouth nightly 4/13/22  Yes Khadijah Grayson MD   isosorbide mononitrate (IMDUR) 30 MG extended release tablet Take 2 tablets by mouth daily 1/19/22  Yes ANICETO Blanton - CNP   Multiple Vitamins-Minerals (THERAPEUTIC MULTIVITAMIN-MINERALS) tablet Take 1 tablet by mouth daily   Yes Historical Provider, MD   traZODone (DESYREL) 100 MG tablet Take 1 tablet by mouth 2 times daily Am pm 11/15/21  Yes ANICETO Garcia - CNP   OXYGEN Inhale 2 L into the lungs as needed    Yes Historical Provider, MD   nitroGLYCERIN (NITROSTAT) 0.4 MG SL tablet Place 1 tablet under the tongue every 5 minutes as needed for Chest pain 4/29/21  Yes Khadijah Grayson MD   aspirin 81 MG EC tablet Take 1 tablet by mouth daily 9/30/20  Yes Gregor Charles APRN - CNP   Reports he is not taking digoxin, potassium, furosemide or statin presently. He is taking Plavix in addition aspirin.     CURRENT Medications:  No current facility-administered medications for this visit. Allergies:  Dilaudid [hydromorphone hcl] and Codeine     Review of Systems:   A 14 point review of symptoms completed. Pertinent positives identified in the HPI, all other review of symptoms negative. Objective:     Vitals:    12/05/22 0849   BP: 110/62   Pulse: 78   SpO2: 96%   Weight: 300 lb (136.1 kg)   Height: 5' 4\" (1.626 m)          Wt Readings from Last 3 Encounters:   12/05/22 300 lb (136.1 kg)   11/23/22 (!) 305 lb (138.3 kg)   11/22/22 (!) 305 lb (138.3 kg)       PHYSICAL EXAM:    General:  No acute distress   Head:  Normocephalic, atraumatic   Eyes:  Conjunctiva/corneas clear, anicteric sclerae    Nose: Nares normal, no drainage or sinus tenderness   Throat: No abnormalities of the lips, oral mucosa or tongue. Moist mucous membranes. Neck: Trachea midline. Neck supple with no lymphadenopathy, thyroid not enlarged, symmetric, no tenderness/mass/nodules   Lungs:   Clear to auscultation bilaterally, no wheezes, no rales, no respiratory distress   Chest Wall:  Well-healed sternotomy incision. Well healed PPM site. Heart:  Regular rate and rhythm, normal S1, normal S2, no murmur, no rub, no S3/S4, PMI non-palpable. Abdomen:   Obese, soft, non-tender, with normoactive bowel sounds. No masses, no hepatosplenomegaly   Extremities: No cyanosis, clubbing. No edema. Vascular:  1+ right radial, 2+ left radial, 1+ dorsalis pedis and posterior tibial pulses bilaterally. Brisk carotid upstrokes without carotid bruit. Skin: Skin color, texture, turgor are normal with no rashes or ulceration. Pysch: Euthymic mood, appropriate affect   Neurologic: Oriented to person, place and time. No slurred speech or facial asymmetry. No motor or sensory deficits on gross examination.          Labs:   CBC:   Lab Results   Component Value Date/Time    WBC 11.6 11/22/2022 10:37 AM    RBC 5.30 11/22/2022 10:37 AM    HGB 15.0 11/22/2022 10:37 AM    HCT 45.6 11/22/2022 10:37 AM    MCV 86.1 11/22/2022 10:37 AM    RDW 15.4 11/22/2022 10:37 AM     11/22/2022 10:37 AM     CMP:  Lab Results   Component Value Date/Time     11/22/2022 10:37 AM    K 4.3 11/22/2022 10:37 AM    K 4.6 05/13/2022 09:42 AM    CL 98 11/22/2022 10:37 AM    CO2 33 11/22/2022 10:37 AM    BUN 17 11/22/2022 10:37 AM    CREATININE 0.8 11/22/2022 10:37 AM    GFRAA >60 09/07/2022 01:55 PM    GFRAA >60 03/16/2011 11:15 AM    AGRATIO 1.2 05/13/2022 09:42 AM    LABGLOM >60 11/22/2022 10:37 AM    GLUCOSE 99 11/22/2022 10:37 AM    PROT 6.9 05/13/2022 09:42 AM    PROT 6.9 03/16/2011 11:15 AM    CALCIUM 9.5 11/22/2022 10:37 AM    BILITOT 0.3 05/13/2022 09:42 AM    ALKPHOS 113 05/13/2022 09:42 AM    AST 20 05/13/2022 09:42 AM    ALT 28 05/13/2022 09:42 AM     PT/INR:  No results found for: PTINR  HgBA1c:  Lab Results   Component Value Date    LABA1C 6.3 11/11/2021     Lab Results   Component Value Date    TROPONINI <0.01 09/05/2021     Fasting lipid profile from 9/25/2020 reviewed. HDL 34, LDL 16, triglycerides 173, total cholesterol 85.     Lab Results   Component Value Date    CHOL 95 05/27/2022    CHOL 82 11/11/2021    CHOL 85 09/25/2020     Lab Results   Component Value Date    TRIG 199 (H) 05/27/2022    TRIG 126 11/11/2021    TRIG 173 (H) 09/25/2020     Lab Results   Component Value Date    HDL 28 (L) 05/27/2022    HDL 34 (L) 11/11/2021    HDL 35 (L) 01/13/2021     Lab Results   Component Value Date    LDLCALC 27 05/27/2022    LDLCALC 23 11/11/2021    LDLCALC 33 01/13/2021     Lab Results   Component Value Date    LABVLDL 40 05/27/2022    LABVLDL 25 11/11/2021    LABVLDL 26 01/13/2021     No results found for: CHOLHDLRATIO    Interval Testing/Data:     Left Heart Cath: 03/23/2022 [facility]  · PCI of the ostial proximal RCA de Lydia stenosis with a 4.0 x 20 mm   synergy drug-eluting stent post dilated 4.5 mm   · PCI of the ramus branch InStent restenosis related to stent overlap and   under expansion with a 3.0 mm noncompliant high-pressure balloon inflation   ·  PCI of the obtuse marginal branch using retrograde epicardial WIRE   ESCALATION with stenting to the left main, 3.5 x 20, 3.0 x 32, 2.25 x 38,   2.25 x 12 mm, post dilated up to 4.5 mm proximally     Left heart cath: 12/22/2021  Successful rotational atherectomy and PCI of D1 with single drug-eluting stent  Successful rotational atherectomy and PCI of left main/LAD with single drug-eluting stent  Medical therapy for LCx , this lesion does not appear to be amenable for PCI    ECHO 12/1/21:   Summary   Technically difficult examination secondary to habitus. LV systolic function is normal with EF estimated at 55%. Endocardium not entirely well visualized but no obvious segmental wall   motion abnormalities. There is mild concentric left ventricular hypertrophy. Normal diastolic function with normal LV filling pressure. Mild mitral annular calcification. Aortic valve appears sclerotic but opens adequately. Individual aortic valve leaflets are not clearly visualized. Cannot r/o   bicuspid valve. Stress test 12/1/21:   Summary  Abnormal moderate risk myocardial perfusion study. There is a large sized lateral defect consistent with ischemia. The left ventricular size and function are normal.  The estimated left ventricular function is 60%. Stress Protocols   Resting ECG  Normal sinus rhythm; left axis; cannot  r/o prior inferior and anterolateral  infarct; low voltage; nonspecific ST  change      [facility] 4/19/21   Left heart catheterization     LVEDP  5      See diagnostic catheterization report for full details, would add that there is severe tortuosity and eccentric plaque within the left main, LAD and D1 which proximal to mid was 75% and distally was 99%.        CONCLUSIONS:      Successful rotational atherectomy and PCI of D1 with single drug-eluting stent  Successful rotational atherectomy and PCI of left main/LAD with single drug-eluting stent  Medical therapy for LCx , this lesion does not appear to be amenable for PCI      [facility] 4/14/2021  LVGRAM     LVEDP  6   GRADIENT ACROSS AORTIC VALVE  none   LV FUNCTION EF 60%   WALL MOTION  normal   MITRAL REGURGITATION  mild         CORONARY ARTERIES     LM Proximal less than 10% stenosis, mid-distal haziness with calcification and 70% eccentric stenosis. LAD  Proximal-mid diffuse tubular 80% stenosis, there is mid-distal bidirectional flow noted. Distal vessel is visualized on LIMA angiogram, this shows 60% distal stenosis. D1 is a large vessel with tortuosity noted and ostial/proximal 70% stenosis followed by mid vessel calcification with 50% stenosis and mid-distal 99% stenosis. LCX Medium to large size vessel, proximal 30 to 40% stenosis. OM 3 appears to be the largest OM and is 100% proximal-mid , distal vessel is seen through left to left collaterals and appears to have less than 10% stenosis. RCA Dominant, tortuous, proximal-mid 60 to 70% stenosis. There are moderate right to left collaterals to the circumflex. BYPASS GRAFTS     LIMA-LAD Widely patent with less than 10% [id number] stenosis, the distal LAD has stenosis as noted above. SVG-D1  100% proximal/mid-distal          SVG-circumflex  100% [id number]             Findings and plans as noted below were discussed with the patient and family, they understand/agree        CONCLUSIONS:      2 out of 3 bypass grafts are occluded, the LIMA to LAD graft is patent  We will consult with CT surgery regarding options for revascularization which potentially may include redo CABG versus high risk PCI of the left main into diagonal  Will order CTA abd/bilat LE w/ runoff, in case cardiac support devices are needed     ECHO 3/12/21  Summary   Limited echo for left ventricle systolic function. Definity is used for   myocardial border enhancement.    LV systolic function is mildly reduced with a visually estimated EF=45-50%. The left ventricle is normal in size with normal wall thickness. More prominent Inferior HK on certain views. Indeterminate diastolic function. CAB2020  Urgent coronary artery bypass grafting surgery x3 with a single greater saphenous vein graft to the obtuse marginal branch of the circumflex, separate single greater saphenous vein graft to the first diagonal branch of the LAD, pedicled left internal artery to the LAD. Watson-Augusto catheter placement. Cardiopulmonary bypass. Endoscopic vein harvesting of the right greater saphenous vein. Transesophageal echo. Epiaortic ultrasound. Doppler verification of grafts. Bilateral five-level intercostal nerve block with Exparel. Platelet gel application         Impression and Plan     Coronary artery disease manifest with unstable angina status post bypass 2020 with occluded SVG's status post multiple subsequent PCI/ revascularization of diagonal, ramus, right coronary territories. Chronic stable angina  Chronic congestive heart failure with recovered EF compensated by exam today  History of heart block status post dual-chamber PPM   Obstructive sleep apnea noncompliant with CPAP  Obesity with BMI 51  Tobacco abuse, now quit.   Chronic obstructive pulmonary disease, moderate by PFTs  Carotid artery disease, mild    Patient Active Problem List   Diagnosis    Crush injury of right foot    Shortness of breath    Erectile dysfunction    Hyperglycemia    Anxiety    Depression    History of alcohol abuse    Fatigue    OANH (obstructive sleep apnea)    Hypersomnia    Chronic obstructive pulmonary disease (HCC)    Lumbar radiculopathy    HNP (herniated nucleus pulposus), lumbar    Spondylosis without myelopathy or radiculopathy, lumbar region    DDD (degenerative disc disease), lumbar    Lumbar spine pain    Acute respiratory distress    Class 3 severe obesity with body mass index (BMI) of 45.0 to 49.9 in adult [facility])    Pneumonia, primary atypical    Acute respiratory failure with hypoxia (HCC)    Moderate protein-calorie malnutrition (HCC)    Acute respiratory failure (HCC)    Acute on chronic respiratory failure with hypoxemia (HCC)    Acute diastolic congestive heart failure (HCC)    Hyperlipidemia    Insomnia    Morbid obesity with BMI of 45.0-49.9, adult (HCC)    Abnormal cardiovascular stress test    Coronary artery disease involving native heart    S/P three vessel coronary artery bypass    Acute kidney injury ([facility] [address]    Chronic combined systolic and diastolic congestive heart failure (HCC)    Mobitz type I Wenckebach atrioventricular block    Asystole ([address]    Ischemic cardiomyopathy    Diverticulitis of colon    Coronary artery disease involving native coronary artery of native heart with unstable angina pectoris (HCC)    CHB (complete heart block) (HCC)    Sinus bradycardia    Cellulitis    Acute deep vein thrombosis (DVT) of left upper extremity (HCC)    Cellulitis of chest wall    Painful orthopaedic hardware ([facility] [address]    SSS (sick sinus syndrome) (HCC)    Observed sleep apnea    ASHD (arteriosclerotic heart disease)    Pacemaker    Chronic total occlusion of coronary artery    Ingrowing nail    Tinea unguium    Pain in left toe(s)    Other hammer toe(s) (acquired), left foot       PLAN:  1. Fasting lipids today  2. We will obtain the films from your most recent Cardiac Cath with Dr. Bridger Romano at [facility]  3. I have discussed case with Dr. Cherry Garcia. We will plan for noninvasive stress test for further evaluation, review of films with interventional, and diagnostic catheterization as indicated following this. 4.  He was advised to stop Imdur today  5. Will increase to 120 mg of isosorbide (Imdur) today. Patient was counseled on side effects. 6. Continue all other cardiac medications as prescribed. Follow up with Dr. Cherry Garcia in 3 weeks; I will call with results of stress test as they return. Scribe's attestation:   This note was scribed in the presence of Dr. Steve Jacques MD by Catalina Power, RN    The scribes documentation has been prepared under my direction and personally reviewed by me in its entirety. I confirm that the note above accurately reflects all work, treatment, procedures, and medical decision making performed by me. Geri Bruce MD, personally performed the services described in this documentation as scribed by  Catalina Power RN in my presence, and it is both accurate and complete to the best of our ability. I will address the patient's cardiac risk factors and adjusted pharmacologic treatment as needed. In addition, I have reinforced the need for patient directed risk factor modification. All questions and concerns were addressed to the patient/family. Alternatives to my treatment were discussed. Thank you for allowing us to participate in the care of Dexter López. Please call me with any questions [phone].     Reji Buckley MD, [facility]   Cardiovascular Disease  [facility]  [phone] [facility]  [phone] [address] [place] [address]  12/5/2022 9:26 AM

## 2022-12-05 ENCOUNTER — HOSPITAL ENCOUNTER (OUTPATIENT)
Age: 62
Discharge: HOME | End: 2022-12-05
Payer: COMMERCIAL

## 2022-12-05 ENCOUNTER — OFFICE VISIT (OUTPATIENT)
Dept: CARDIOLOGY CLINIC | Age: 62
End: 2022-12-05

## 2022-12-05 VITALS
OXYGEN SATURATION: 96 % | DIASTOLIC BLOOD PRESSURE: 62 MMHG | SYSTOLIC BLOOD PRESSURE: 110 MMHG | WEIGHT: 300 LBS | BODY MASS INDEX: 51.22 KG/M2 | HEIGHT: 64 IN | HEART RATE: 78 BPM

## 2022-12-05 DIAGNOSIS — R06.02 SHORTNESS OF BREATH: ICD-10-CM

## 2022-12-05 DIAGNOSIS — R07.89 OTHER CHEST PAIN: ICD-10-CM

## 2022-12-05 DIAGNOSIS — G47.33 OSA (OBSTRUCTIVE SLEEP APNEA): ICD-10-CM

## 2022-12-05 DIAGNOSIS — I25.110 CORONARY ARTERY DISEASE INVOLVING NATIVE CORONARY ARTERY OF NATIVE HEART WITH UNSTABLE ANGINA PECTORIS (HCC): Primary | ICD-10-CM

## 2022-12-05 DIAGNOSIS — Z95.1 S/P THREE VESSEL CORONARY ARTERY BYPASS: ICD-10-CM

## 2022-12-05 DIAGNOSIS — I25.110 CORONARY ARTERY DISEASE INVOLVING NATIVE CORONARY ARTERY OF NATIVE HEART WITH UNSTABLE ANGINA PECTORIS (HCC): ICD-10-CM

## 2022-12-05 PROCEDURE — 36415 COLL VENOUS BLD VENIPUNCTURE: CPT

## 2022-12-05 PROCEDURE — 80061 LIPID PANEL: CPT

## 2022-12-05 NOTE — PATIENT INSTRUCTIONS
PLAN:  1. Fasting lipids today  2. We will call you with the results  3. We will obtain the films from your most recent Cardiac Cath with Dr. Primitivo Yung at [facility]  4. Once these are reviewed will call you with next step  5. Stop taking the 30 mg isosorbide (Imdur)  6. Start taking 120 mg of isosorbide (Imdur). Report any drop in blood pressure or increased dizziness  7. Continue all other medications.      Follow up with Dr. Shereen Curtis in 3 weeks

## 2022-12-06 ENCOUNTER — TELEPHONE (OUTPATIENT)
Dept: CARDIOLOGY CLINIC | Age: 62
End: 2022-12-06

## 2022-12-06 LAB
CHOLESTEROL, TOTAL: 90 MG/DL (ref 0–199)
HDLC SERPL-MCNC: 29 MG/DL (ref 40–60)
LDL CHOLESTEROL CALCULATED: 33 MG/DL
TRIGL SERPL-MCNC: 140 MG/DL (ref 0–150)
VLDLC SERPL CALC-MCNC: 28 MG/DL

## 2022-12-06 NOTE — TELEPHONE ENCOUNTER
----- Message from Sixto Padgett MD sent at 12/6/2022 10:48 AM EST -----  Pls tell vashti lipids look good. No changes.

## 2022-12-07 RX ORDER — ISOSORBIDE MONONITRATE 120 MG/1
120 TABLET, EXTENDED RELEASE ORAL DAILY
Qty: 30 TABLET | Refills: 3 | Status: SHIPPED | OUTPATIENT
Start: 2022-12-07

## 2022-12-08 ENCOUNTER — TELEPHONE (OUTPATIENT)
Dept: CARDIOLOGY CLINIC | Age: 62
End: 2022-12-08

## 2022-12-08 DIAGNOSIS — R06.02 SHORTNESS OF BREATH: ICD-10-CM

## 2022-12-08 DIAGNOSIS — I25.110 CORONARY ARTERY DISEASE INVOLVING NATIVE CORONARY ARTERY OF NATIVE HEART WITH UNSTABLE ANGINA PECTORIS (HCC): Primary | ICD-10-CM

## 2022-12-08 DIAGNOSIS — R07.89 OTHER CHEST PAIN: ICD-10-CM

## 2022-12-08 DIAGNOSIS — Z95.1 S/P THREE VESSEL CORONARY ARTERY BYPASS: ICD-10-CM

## 2022-12-08 NOTE — TELEPHONE ENCOUNTER
Dr. Moi Nelson spoke with Dr. Alix Cuevas. Recommend a Stress test to risk stratify. If stress test comes back abnormal then would proceed with a LHC. Lexiscan Stress test ordered. Left message for patient to return call. Need to let him know that a stress test has been ordered. He will need to call central scheduling to schedule.   [phone]  If test is abnormal will discuss proceeding with a left heart cath Statement Selected

## 2022-12-13 ENCOUNTER — HOSPITAL ENCOUNTER (OUTPATIENT)
Dept: MRI IMAGING | Age: 62
Discharge: HOME | End: 2022-12-13
Payer: COMMERCIAL

## 2022-12-13 ENCOUNTER — HOSPITAL ENCOUNTER (OUTPATIENT)
Age: 62
End: 2022-12-13
Payer: COMMERCIAL

## 2022-12-13 DIAGNOSIS — M48.062 SPINAL STENOSIS OF LUMBAR REGION WITH NEUROGENIC CLAUDICATION: ICD-10-CM

## 2022-12-13 PROCEDURE — 72148 MRI LUMBAR SPINE W/O DYE: CPT

## 2022-12-22 ENCOUNTER — HOSPITAL ENCOUNTER (OUTPATIENT)
Dept: NUCLEAR MEDICINE | Age: 62
Discharge: HOME | End: 2022-12-22
Payer: COMMERCIAL

## 2022-12-22 ENCOUNTER — HOSPITAL ENCOUNTER (OUTPATIENT)
Dept: NON INVASIVE DIAGNOSTICS | Age: 62
Discharge: HOME | End: 2022-12-22
Payer: COMMERCIAL

## 2022-12-22 DIAGNOSIS — R06.02 SHORTNESS OF BREATH: ICD-10-CM

## 2022-12-22 DIAGNOSIS — Z95.1 S/P THREE VESSEL CORONARY ARTERY BYPASS: ICD-10-CM

## 2022-12-22 DIAGNOSIS — R07.89 OTHER CHEST PAIN: ICD-10-CM

## 2022-12-22 DIAGNOSIS — I25.110 CORONARY ARTERY DISEASE INVOLVING NATIVE CORONARY ARTERY OF NATIVE HEART WITH UNSTABLE ANGINA PECTORIS (HCC): ICD-10-CM

## 2022-12-22 PROCEDURE — A9502 TC99M TETROFOSMIN: HCPCS | Performed by: INTERNAL MEDICINE

## 2022-12-22 PROCEDURE — 6360000002 HC RX W HCPCS: Performed by: INTERNAL MEDICINE

## 2022-12-22 PROCEDURE — 3430000000 HC RX DIAGNOSTIC RADIOPHARMACEUTICAL: Performed by: INTERNAL MEDICINE

## 2022-12-22 PROCEDURE — 93017 CV STRESS TEST TRACING ONLY: CPT

## 2022-12-22 PROCEDURE — 78452 HT MUSCLE IMAGE SPECT MULT: CPT

## 2022-12-22 RX ADMIN — TETROFOSMIN 31 MILLICURIE: 1.38 INJECTION, POWDER, LYOPHILIZED, FOR SOLUTION INTRAVENOUS at 09:35

## 2022-12-22 RX ADMIN — REGADENOSON 0.4 MG: 0.08 INJECTION, SOLUTION INTRAVENOUS at 09:35

## 2022-12-22 NOTE — PROGRESS NOTES
Pt completed stress portion of cardiac stress test. Patient had lexiscan related SOB that resolved without intervention in recovery. Pt is discharged to nuclear department for scan. Nuclear tech will remove PIV. Discharge instructions given to pt. Pt verbalizes understanding to discharge instructions. Patient will return tomorrow for resting scan.

## 2022-12-22 NOTE — PROGRESS NOTES
Patient arrived to stress lab for Lexiscan/Myoview Stress test. 2 day test. Patient was educated on procedure, all questions answered, and consent verified

## 2022-12-23 ENCOUNTER — PATIENT MESSAGE (OUTPATIENT)
Dept: CARDIOLOGY CLINIC | Age: 62
End: 2022-12-23

## 2022-12-23 ENCOUNTER — HOSPITAL ENCOUNTER (OUTPATIENT)
Dept: NUCLEAR MEDICINE | Age: 62
Discharge: HOME | End: 2022-12-23
Payer: COMMERCIAL

## 2022-12-23 PROCEDURE — 3430000000 HC RX DIAGNOSTIC RADIOPHARMACEUTICAL: Performed by: INTERNAL MEDICINE

## 2022-12-23 PROCEDURE — A9502 TC99M TETROFOSMIN: HCPCS | Performed by: INTERNAL MEDICINE

## 2022-12-23 RX ADMIN — TETROFOSMIN 33.8 MILLICURIE: 1.38 INJECTION, POWDER, LYOPHILIZED, FOR SOLUTION INTRAVENOUS at 10:20

## 2022-12-23 NOTE — TELEPHONE ENCOUNTER
From: Betty Ramirez  Sent: 12/23/2022 2:36 PM EST  To: [facility] [phone] Practice Staff  Subject: Question regarding NM Myocardial SPECT Rest Exercise or Rx    Yes  thank you

## 2022-12-24 RX ORDER — RANOLAZINE 500 MG/1
500 TABLET, EXTENDED RELEASE ORAL 2 TIMES DAILY
Qty: 60 TABLET | Refills: 2 | Status: SHIPPED | OUTPATIENT
Start: 2022-12-24 | End: 2023-03-24

## 2022-12-24 NOTE — TELEPHONE ENCOUNTER
Spoke with vashti about his stress test today - reversible lateral wall defect. Start ranexa 500 mcg BID. Follow up re-eval 1/5 with [facility] for re-assessment, consideration of repeat left heart catheterization. Check EKG at that visit. Will forward to [facility] as FYI.      Talia Dallas MD, [address] [facility]  [phone] [facility]  [phone] [facility]

## 2022-12-27 RX ORDER — ONDANSETRON 4 MG/1
TABLET, FILM COATED ORAL
Qty: 30 TABLET | Refills: 0 | Status: SHIPPED | OUTPATIENT
Start: 2022-12-27

## 2022-12-27 RX ORDER — METOPROLOL SUCCINATE 25 MG/1
TABLET, EXTENDED RELEASE ORAL
Qty: 90 TABLET | Refills: 1 | Status: SHIPPED | OUTPATIENT
Start: 2022-12-27

## 2023-01-03 NOTE — PROGRESS NOTES
[address] [facility]  [phone]      Attending Physician: Lexa Fofana  Reason for Consultation/Chief Complaint: Follow up, discuss PCI/STENTS    Subjective   History of Present Illness:  Radha Paniagua is a 63 y.o. male presenting today for 3 week follow up after initialing seeing Dr. Raven Arriola MD and to further discuss the need for cardiac catheterization again. He has a past medical history notable for OANH noncompliant with CPAP, Mobitz 1 with 2:1, congestive heart failure with ischemic cardiomyopathy, and complex CAD, CABG x3 and multiple prior PCI. Last ECHO 12/1/21 showed EF of 55%. Mild mitral annular calcification. Aortic valve sclerotic, but opens adequately. Last heart cath 12/22/21 LM 10%, LAD 80%, LCX 85%, RCA 75%. Successful PCI of D1 with ISREAL x1. NM Stress test 12/23/22 showed large, moderate-severe, predominantly reversible basal to apical anterolateral/inferolateral wall perfusion defect. Post-stress LVEF reduced at 49%. Today he reports that he has not been feeling good at all. He sleeps 20 hours a day, no energy and gets shortness of breathe. He denies dizziness syncope and edema.      Past Medical History:   has a past medical history of Acute diverticulitis, Acute kidney injury ([address] Acute on chronic respiratory failure with hypoxemia ([address] Acute respiratory failure ([address] Acute respiratory failure with hypoxia ([address] Anxiety, Aortic valve calcification, CAD in native artery, Chronic obstructive pulmonary disease (HCC), Chronic systolic congestive heart failure (HCC), Class 3 severe obesity with body mass index (BMI) of 45.0 to 49.9 in [facility]), Coronary artery calcification, Coronary artery disease involving native heart with angina pectoris (HCC), Crush injury of right foot, DDD (degenerative disc disease), lumbar, Depression, Diverticulitis of colon, Erectile dysfunction, Fatigue, HNP (herniated nucleus pulposus), lumbar, Hyperglycemia, Hyperlipidemia, Hypersomnia, Hypertension, Insomnia, Ischemic cardiomyopathy, Kidney stone, Lumbar radiculopathy, Lumbar spine pain, LVH (left ventricular hypertrophy), Mobitz type I Wenckebach atrioventricular block, Moderate protein-calorie malnutrition ([facility] [address] Observed sleep apnea, OANH (obstructive sleep apnea), Pneumonia, primary atypical, Reactive depression, S/P three vessel coronary artery bypass, Spondylosis without myelopathy or radiculopathy, lumbar region, Tobacco abuse, Tobacco use, and Wears dentures. Surgical History:   has a past surgical history that includes Cholecystectomy; Cystoscopy (03/16/2011); Pain management procedure (Right, 12/24/2019); Pain management procedure (Right, 01/07/2020); Coronary artery bypass graft (N/A, 09/25/2020); Finger amputation (Right, 02/02/2021); Coronary angioplasty with stent (03/2021); Ankle surgery (Left, 08/02/2021); and other surgical history. Social History:   reports that he quit smoking about 2 years ago. His smoking use included cigarettes. He has a 70.00 pack-year smoking history. He has never used smokeless tobacco. He reports that he does not currently use drugs after having used the following drugs: Marijuana (Weed) and Cocaine. He reports that he does not drink alcohol. Family History:  family history includes Heart Disease in his mother; High Blood Pressure in his sister; No Known Problems in his sister; Other in his mother. Home Medications:  Were reviewed and are listed in nursing record and/or below  Prior to Admission medications    Medication Sig Start Date End Date Taking?  Authorizing Provider   metoprolol succinate (TOPROL XL) 25 MG extended release tablet TAKE ONE TABLET BY MOUTH DAILY 12/27/22  Yes ANICETO Dickson CNP   ondansetron (ZOFRAN) 4 MG tablet TAKE ONE TABLET BY MOUTH DAILY AS NEEDED FOR NAUSEA AND VOMITING 12/27/22  Yes ANICETO Washington CNP   ranolazine (RANEXA) 500 MG extended release tablet Take 1 tablet by mouth 2 times daily 12/24/22 3/24/23 Yes Hetal Keene MD   isosorbide mononitrate (IMDUR) 120 MG extended release tablet Take 1 tablet by mouth daily 12/7/22  Yes Hetal Keene MD   rOPINIRole (REQUIP) 0.5 MG tablet Take 1 tablet by mouth at bedtime 11/23/22  Yes ANICETO Brooks - CNP   Cholecalciferol (VITAMIN D3) 1.25 MG (58348 UT) CAPS TAKE ONE CAPSULE BY MOUTH ONCE WEEKLY 11/21/22  Yes ANICETO Brooks - CNP   fluticasone-umeclidin-vilant (TRELEGY ELLIPTA) 100-62.5-25 MCG/INH AEPB Inhale 1 puff into the lungs daily 10/13/22  Yes Oseas Nobles MD   albuterol (PROVENTIL) (2.5 MG/3ML) 0.083% nebulizer solution Take 3 mLs by nebulization every 6 hours as needed for Wheezing or Shortness of Breath 10/13/22  Yes Oseas Nobles MD   albuterol sulfate HFA (PROVENTIL HFA) 108 (90 Base) MCG/ACT inhaler Inhale 2 puffs into the lungs every 4 hours as needed for Wheezing or Shortness of Breath 10/13/22  Yes Oseas Nobles MD   benzonatate (TESSALON) 200 MG capsule TAKE ONE CAPSULE BY MOUTH THREE TIMES A DAY AS NEEDED FOR COUGH 8/3/22  Yes Juan Oconnor MD   lansoprazole (PREVACID) 30 MG delayed release capsule TAKE ONE CAPSULE BY MOUTH DAILY 8/3/22  Yes Juan Oconnor MD   Omega-3 Fatty Acids (FISH OIL) 1000 MG CAPS Take 2 capsules by mouth 2 times daily 6/24/22  Yes Hetal Keene MD   clopidogrel (PLAVIX) 75 MG tablet Take 1 tablet by mouth daily 5/31/22  Yes Phil Soares DO   furosemide (LASIX) 40 MG tablet Take 1 tablet by mouth as needed (for weight grater than 300lbs)  Patient taking differently: Take 80 mg by mouth 2 times daily 5/19/22 5/19/23 Yes Phil Soares DO   atorvastatin (LIPITOR) 80 MG tablet Take 1 tablet by mouth nightly 4/13/22  Yes Hetal Keene MD   Multiple Vitamins-Minerals (THERAPEUTIC MULTIVITAMIN-MINERALS) tablet Take 1 tablet by mouth daily   Yes Historical Provider, MD   traZODone (DESYREL) 100 MG tablet Take 1 tablet by mouth 2 times daily Am pm 11/15/21  Yes ANICETO Hadley CNP   OXYGEN Inhale 2 L into the lungs as needed    Yes Historical Provider, MD   nitroGLYCERIN (NITROSTAT) 0.4 MG SL tablet Place 1 tablet under the tongue every 5 minutes as needed for Chest pain 4/29/21  Yes Vitor Banegas MD   aspirin 81 MG EC tablet Take 1 tablet by mouth daily 9/30/20  Yes ANICETO Kincaid CNP        CURRENT Medications:  No current facility-administered medications for this visit. Allergies:  Dilaudid [hydromorphone hcl] and Codeine     Review of Systems:   A 14 point review of symptoms completed. Pertinent positives identified in the HPI, all other review of symptoms negative as below.       Objective   PHYSICAL EXAM:    Vitals:    01/05/23 0907   BP: 108/70   Pulse: 88   SpO2: 93%    Weight: (!) 308 lb (139.7 kg)         General Appearance:  Alert, cooperative, no distress, appears stated age   Head:  Normocephalic, without obvious abnormality, atraumatic   Eyes:  PERRL, conjunctiva/corneas clear   Nose: Nares normal, no drainage or sinus tenderness   Throat: Lips, mucosa, and tongue normal   Neck: Supple, no jvp    Lungs:   Clear to auscultation bilaterally, respirations unlabored   Chest Wall:  No deformity or tenderness   Heart:  Regular rate and rhythm, S1, S2 normal, no murmur, rub or gallop   Abdomen:   Soft, non-tender, bowel sounds active all four quadrants,  no masses, no organomegaly   Extremities: Extremities +1 bilat le edema    Pulses: 2+ and symmetric in ue    Skin: Skin color, texture, turgor normal, no rashes or lesions   Pysch: Normal mood and affect   Neurologic: Normal gross motor and sensory exam.         Labs   CBC:   Lab Results   Component Value Date/Time    WBC 11.6 11/22/2022 10:37 AM    RBC 5.30 11/22/2022 10:37 AM    HGB 15.0 11/22/2022 10:37 AM    HCT 45.6 11/22/2022 10:37 AM    MCV 86.1 11/22/2022 10:37 AM    RDW 15.4 11/22/2022 10:37 AM     11/22/2022 10:37 AM     CMP:  Lab Results   Component Value Date/Time     2022 10:37 AM    K 4.3 2022 10:37 AM    K 4.6 2022 09:42 AM    CL 98 2022 10:37 AM    CO2 33 2022 10:37 AM    BUN 17 2022 10:37 AM    CREATININE 0.8 2022 10:37 AM    GFRAA >60 2022 01:55 PM    GFRAA >60 2011 11:15 AM    AGRATIO 1.2 2022 09:42 AM    LABGLOM >60 2022 10:37 AM    GLUCOSE 99 2022 10:37 AM    PROT 6.9 2022 09:42 AM    PROT 6.9 2011 11:15 AM    CALCIUM 9.5 2022 10:37 AM    BILITOT 0.3 2022 09:42 AM    ALKPHOS 113 2022 09:42 AM    AST 20 2022 09:42 AM    ALT 28 2022 09:42 AM     PT/INR:  No results found for: PTINR  HgBA1c:  Lab Results   Component Value Date    LABA1C 6.3 2021     Lab Results   Component Value Date    TROPONINI <0.01 2021         Cardiac Data     Last EK21  NSR, HR 76    Today nsr 1avb, anter/infer infarct, similar to 2022     Echo: 21   Summary   Technically difficult examination secondary to habitus. LV systolic function is normal with EF estimated at 55%. Endocardium not entirely well visualized but no obvious segmental wall   motion abnormalities. There is mild concentric left ventricular hypertrophy. Normal diastolic function with normal LV filling pressure. Mild mitral annular calcification. Aortic valve appears sclerotic but opens adequately. Individual aortic valve leaflets are not clearly visualized. Cannot r/o   bicuspid valve. Stress Test: 22  Summary  Large, moderate-severe, predominantly reversible basal to apical  anterolateral/inferolateral wall perfusion defect. Post-stress LVEF is reduced at 49%. Mild global hypokinesis. Findings are  consistent with mixed scar/ischemia of the left circumflex territory with  significant reversibility noted. Abnormal study. Overall findings represent a intermediate risk scan.      Cath: 21  LVGRAM     LVEDP  10   GRADIENT ACROSS AORTIC VALVE  none LV FUNCTION EF 55-60%   WALL MOTION Subtle mid inferior hypokinesis. MITRAL REGURGITATION  mild      AORTAGRAM     CALIBER  mildly increased in size   AORTIC INSUFFICIENCY  minimal   BYPASS GRAFTS  no bypass grafts visualized            CORONARY ARTERIES     LM Hfeazlrk-eib-oycmvg less than 10% stenosis, there is a stent noted in the mid to distal left main that is widely patent. LAD Proximal-mid 80% stenosis, there is mid-distal nonvisualization of vessel due to bidirectional flow from the LIMA graft. LCX  Calcified, ostial 85% stenosis, proximal-mid 20% stenosis. OM 1 has 100% nozqqetz-vnc-ispczf . There are well-developed right to left collaterals as well as some left to left collaterals. RCA Large vessel, dominant, calcified, mild to moderately tortuous, proximal-mid 75% stenosis. Distal 50 to 60% stenosis. PDA is a medium to large size vessel with proximal-mid 60-70% stenosis. BYPASS GRAFTS     LIMA-LAD  widely patent, there is less than 10% proximal-mid stenosis, however distally at the anastomosis there is a 60% stenosis. SVG-D1  known to be occluded, not injected         SVG-OM  known to be occluded, not injected     CONCLUSIONS:      Successful PCI of D1 with single drug-eluting stent     Successful rotational atherectomy of RCA  Successful shockwave coronary lithotripsy of RCA  Successful PCI of RCA with single drug-eluting stent     Consider staged PCI of LIMA to LAD     Consider referral to [facility] for consideration of LCx  PCI       Studies:   VL DUP Lower extremity Right: 1/11/22  Impressions Right Impression This is a limited arterial duplex scan of the right groin due to concern for pseudoaneurysm. The common femoral artery demonstrates multiphasic flow indicating no significant aorto-iliac inflow disease.  There is atherosclerotic plaque seen within the common femoral, proximal superficial femoral and deep femoral artery consistent with a < 50% stenosis. There is no evidence of pseudoaneurysm or hematoma in the right groin. There is no evidence of deep or superficial venous thrombosis involving the common femoral, femoral, deep femoral or greater saphenous (junction) veins. There is no evidence suggesting an arteriovenous fistula. Conclusions    Summary    No evidence of right femoral pseudoaneurysm or arteriovenous fistula S/P  cardiac intervention. I have reviewed labs and imaging/xray/diagnostic testing in this note. Assessment and Plan      1. ASHD (arteriosclerotic heart disease)    2. Chronic total occlusion of coronary artery    3. SSS (sick sinus syndrome) ([address]    4. Sinus bradycardia    5. CHB (complete heart block) ([facility])    6. Coronary artery disease involving native coronary artery of native heart with unstable angina pectoris ([address]    7. Ischemic cardiomyopathy    8. Asystole ([address]    9. Mobitz type I Wenckebach atrioventricular block    10. Chronic combined systolic and diastolic congestive heart failure ([address]    11. Mixed hyperlipidemia        PLAN:  Continue taking all the same medications for chronic heart conditions above   Proceed with cardiac cath with full anesthesia, will need [employer]. Jennifer with scheduling will contact you  START taking metolazone 5 mg once weekly, 30 minutes before taking lasix  Lab work BNP BMP- get done before cath  Follow up after cardiac cath      DISCUSSION OF CARDIAC CATHETERIZATION PROCEDURES: The procedures, indications, risks and alternatives have been discussed with the patient and, as appropriate, with the patient's guardian . Risks discussed included, but are not limited to, bleeding, development of blood clots/emboli, damage to blood vessels, renal failure, malignant cardiac arrhythmias, stroke, heart attack, emergent coronary bypass surgery, death, dye allergy.   The patient (and guardian as appropriate) expressed understanding of the aforementioned and wished to proceed. Pt understands this is a higher risk procedure d/t need for gen anaesthesia, likely will need groin approach and w/ pt size, there is incrs risk of complications/death, he understands r/b/a/o and wishes to proceed. He understands that if anatomy is not imminently suitable for pci, may have to defer that and only do diag cath and may require rereferral to tertiary center. Scribe's attestation: This note was scribed in the presence of Dr. John Smith MD   by Joselyn De Oliveira LPN      Thank you for allowing us to participate in the care of [facility]. Please call me with any questions [phone].     John Smith MD, [facility]   Interventional Cardiologist  [address]  [phone] [facility]  [phone] [address] [place] [address]  1/5/2023 9:23 AM

## 2023-01-05 ENCOUNTER — OFFICE VISIT (OUTPATIENT)
Dept: CARDIOLOGY CLINIC | Age: 63
End: 2023-01-05
Payer: COMMERCIAL

## 2023-01-05 ENCOUNTER — TELEPHONE (OUTPATIENT)
Dept: CARDIOLOGY CLINIC | Age: 63
End: 2023-01-05

## 2023-01-05 VITALS
OXYGEN SATURATION: 93 % | WEIGHT: 308 LBS | BODY MASS INDEX: 52.58 KG/M2 | SYSTOLIC BLOOD PRESSURE: 108 MMHG | DIASTOLIC BLOOD PRESSURE: 70 MMHG | HEIGHT: 64 IN | HEART RATE: 88 BPM

## 2023-01-05 DIAGNOSIS — I44.2 CHB (COMPLETE HEART BLOCK) (HCC): ICD-10-CM

## 2023-01-05 DIAGNOSIS — I46.9 ASYSTOLE (HCC): ICD-10-CM

## 2023-01-05 DIAGNOSIS — R00.1 SINUS BRADYCARDIA: ICD-10-CM

## 2023-01-05 DIAGNOSIS — R60.9 EDEMA, UNSPECIFIED TYPE: ICD-10-CM

## 2023-01-05 DIAGNOSIS — I25.5 ISCHEMIC CARDIOMYOPATHY: ICD-10-CM

## 2023-01-05 DIAGNOSIS — I50.42 CHRONIC COMBINED SYSTOLIC AND DIASTOLIC CONGESTIVE HEART FAILURE (HCC): ICD-10-CM

## 2023-01-05 DIAGNOSIS — I25.110 CORONARY ARTERY DISEASE INVOLVING NATIVE CORONARY ARTERY OF NATIVE HEART WITH UNSTABLE ANGINA PECTORIS (HCC): ICD-10-CM

## 2023-01-05 DIAGNOSIS — I25.10 ASHD (ARTERIOSCLEROTIC HEART DISEASE): Primary | ICD-10-CM

## 2023-01-05 DIAGNOSIS — E78.2 MIXED HYPERLIPIDEMIA: ICD-10-CM

## 2023-01-05 DIAGNOSIS — I44.1 MOBITZ TYPE I WENCKEBACH ATRIOVENTRICULAR BLOCK: ICD-10-CM

## 2023-01-05 DIAGNOSIS — I49.5 SSS (SICK SINUS SYNDROME) (HCC): ICD-10-CM

## 2023-01-05 DIAGNOSIS — I25.82 CHRONIC TOTAL OCCLUSION OF CORONARY ARTERY: ICD-10-CM

## 2023-01-05 PROCEDURE — 1036F TOBACCO NON-USER: CPT | Performed by: INTERNAL MEDICINE

## 2023-01-05 PROCEDURE — G8427 DOCREV CUR MEDS BY ELIG CLIN: HCPCS | Performed by: INTERNAL MEDICINE

## 2023-01-05 PROCEDURE — G8482 FLU IMMUNIZE ORDER/ADMIN: HCPCS | Performed by: INTERNAL MEDICINE

## 2023-01-05 PROCEDURE — 99215 OFFICE O/P EST HI 40 MIN: CPT | Performed by: INTERNAL MEDICINE

## 2023-01-05 PROCEDURE — G8417 CALC BMI ABV UP PARAM F/U: HCPCS | Performed by: INTERNAL MEDICINE

## 2023-01-05 PROCEDURE — 3017F COLORECTAL CA SCREEN DOC REV: CPT | Performed by: INTERNAL MEDICINE

## 2023-01-05 PROCEDURE — 93000 ELECTROCARDIOGRAM COMPLETE: CPT | Performed by: INTERNAL MEDICINE

## 2023-01-05 RX ORDER — METOLAZONE 5 MG/1
5 TABLET ORAL WEEKLY
Qty: 30 TABLET | Refills: 1 | Status: SHIPPED | OUTPATIENT
Start: 2023-01-05

## 2023-01-05 NOTE — PATIENT INSTRUCTIONS
PLAN:  Continue taking all the same medications for chronic heart conditions above   Proceed with cardiac cath with full anesthesia. Jennifer with scheduling will contact you  START taking metolazone 5 mg once weekly, 30 minutes before taking lasix  Lab work BNP BMP- get done before cath  Follow up after cardiac cath      DISCUSSION OF CARDIAC CATHETERIZATION PROCEDURES: The procedures, indications, risks and alternatives have been discussed with the patient and, as appropriate, with the patient's guardian . Risks discussed included, but are not limited to, bleeding, development of blood clots/emboli, damage to blood vessels, renal failure, malignant cardiac arrhythmias, stroke, heart attack, emergent coronary bypass surgery, death, dye allergy. The patient (and guardian as appropriate) expressed understanding of the aforementioned and wished to proceed.

## 2023-01-05 NOTE — TELEPHONE ENCOUNTER
Pt seen in [address] today with [facility] at [place]. [facility] discussed and will need to be scheduled for procedure. Pt will need to have full anesthesia for procedure [facility]. Jennifer, reach out to [facility] about more details.

## 2023-01-06 ENCOUNTER — HOSPITAL ENCOUNTER (OUTPATIENT)
Age: 63
Discharge: HOME | End: 2023-01-06
Payer: COMMERCIAL

## 2023-01-06 ENCOUNTER — TELEPHONE (OUTPATIENT)
Dept: CARDIOLOGY CLINIC | Age: 63
End: 2023-01-06

## 2023-01-06 DIAGNOSIS — I50.31 ACUTE DIASTOLIC CONGESTIVE HEART FAILURE (HCC): Primary | ICD-10-CM

## 2023-01-06 DIAGNOSIS — I50.42 CHRONIC COMBINED SYSTOLIC AND DIASTOLIC CONGESTIVE HEART FAILURE (HCC): ICD-10-CM

## 2023-01-06 DIAGNOSIS — I44.2 CHB (COMPLETE HEART BLOCK) (HCC): ICD-10-CM

## 2023-01-06 LAB
ANION GAP SERPL CALCULATED.3IONS-SCNC: 9 MMOL/L (ref 3–16)
BUN BLDV-MCNC: 17 MG/DL (ref 7–20)
CALCIUM SERPL-MCNC: 9.4 MG/DL (ref 8.3–10.6)
CHLORIDE BLD-SCNC: 101 MMOL/L (ref 99–110)
CO2: 29 MMOL/L (ref 21–32)
CREAT SERPL-MCNC: 0.9 MG/DL (ref 0.8–1.3)
GFR SERPL CREATININE-BSD FRML MDRD: >60 ML/MIN/{1.73_M2}
GLUCOSE BLD-MCNC: 177 MG/DL (ref 70–99)
POTASSIUM SERPL-SCNC: 4.5 MMOL/L (ref 3.5–5.1)
PRO-BNP: 68 PG/ML (ref 0–124)
SODIUM BLD-SCNC: 139 MMOL/L (ref 136–145)

## 2023-01-06 PROCEDURE — 83880 ASSAY OF NATRIURETIC PEPTIDE: CPT

## 2023-01-06 PROCEDURE — 36415 COLL VENOUS BLD VENIPUNCTURE: CPT

## 2023-01-06 PROCEDURE — 80048 BASIC METABOLIC PNL TOTAL CA: CPT

## 2023-01-06 NOTE — TELEPHONE ENCOUNTER
4 hours sounds good, would recommend scheduling it as both a left heart catheterization and PCI of RCA/circumflex. Thanks.

## 2023-01-06 NOTE — TELEPHONE ENCOUNTER
----- Message from Clifford Owen MD sent at 1/6/2023  2:59 PM EST -----  Let patient know their BMP test is normal, rec: f/u bmp next week as well. Thanks.

## 2023-01-06 NOTE — TELEPHONE ENCOUNTER
Spoke with patient. Patient is scheduled with Dr. Portillo Silverio for Left Heart Cath & PCI RCA/circumflex with [employer] and anesthesia on 1/24/23 at [facility], arrival time of 6:30am to the Cath Lab. Please have patient arrive to the main entrance of [facility] and check in with the registration desk. Please call patient regarding medication instructions. Remind patient to be NPO after midnight (8 hours prior). Do not apply lotions/creams on skin the day of procedure.

## 2023-01-06 NOTE — TELEPHONE ENCOUNTER
I have him scheduled 1/24. Is this being scheduled as a regular LHC or a PCI? The insurance Republic[employer] Mccoy will depend on which one we submit it as. Also, I scheduled it for 4 hours. Is that ok?

## 2023-01-09 ENCOUNTER — TELEPHONE (OUTPATIENT)
Dept: CARDIOLOGY CLINIC | Age: 63
End: 2023-01-09

## 2023-01-09 NOTE — TELEPHONE ENCOUNTER
Spoke with patient. Hold Lasix. Hold metolazone if this is the day you take that. Hold vitamins and supplements. Take all other medications with sips of water.   ROBERTO

## 2023-01-09 NOTE — TELEPHONE ENCOUNTER
----- Message from Nesha Rodriguez MD sent at 1/7/2023 11:27 AM EST -----  Lets get a copy of cd of last cath in 3/2022 at [facility], let me know when we have received it. Lets also see if it can be pushed digitally and lets reach out to their cath and our cath lab as that may help expedite the transfer. Lets get a CD as well regardless of the digital transfer process. Thank you.

## 2023-01-12 ENCOUNTER — OFFICE VISIT (OUTPATIENT)
Dept: FAMILY MEDICINE CLINIC | Age: 63
End: 2023-01-12
Payer: COMMERCIAL

## 2023-01-12 VITALS
SYSTOLIC BLOOD PRESSURE: 118 MMHG | WEIGHT: 308 LBS | DIASTOLIC BLOOD PRESSURE: 64 MMHG | BODY MASS INDEX: 52.58 KG/M2 | HEIGHT: 64 IN | OXYGEN SATURATION: 67 % | HEART RATE: 84 BPM

## 2023-01-12 DIAGNOSIS — R06.02 SHORTNESS OF BREATH: ICD-10-CM

## 2023-01-12 DIAGNOSIS — R53.83 FATIGUE, UNSPECIFIED TYPE: ICD-10-CM

## 2023-01-12 DIAGNOSIS — F32.9 REACTIVE DEPRESSION: ICD-10-CM

## 2023-01-12 DIAGNOSIS — G25.81 RESTLESS LEG: ICD-10-CM

## 2023-01-12 DIAGNOSIS — R41.3 MEMORY PROBLEM: Primary | ICD-10-CM

## 2023-01-12 DIAGNOSIS — G47.33 OSA (OBSTRUCTIVE SLEEP APNEA): ICD-10-CM

## 2023-01-12 DIAGNOSIS — F41.9 ANXIETY: ICD-10-CM

## 2023-01-12 PROCEDURE — 3017F COLORECTAL CA SCREEN DOC REV: CPT | Performed by: NURSE PRACTITIONER

## 2023-01-12 PROCEDURE — 99214 OFFICE O/P EST MOD 30 MIN: CPT | Performed by: NURSE PRACTITIONER

## 2023-01-12 PROCEDURE — 1036F TOBACCO NON-USER: CPT | Performed by: NURSE PRACTITIONER

## 2023-01-12 PROCEDURE — G8417 CALC BMI ABV UP PARAM F/U: HCPCS | Performed by: NURSE PRACTITIONER

## 2023-01-12 PROCEDURE — G8427 DOCREV CUR MEDS BY ELIG CLIN: HCPCS | Performed by: NURSE PRACTITIONER

## 2023-01-12 PROCEDURE — G8482 FLU IMMUNIZE ORDER/ADMIN: HCPCS | Performed by: NURSE PRACTITIONER

## 2023-01-12 RX ORDER — ROPINIROLE 1 MG/1
1 TABLET, FILM COATED ORAL NIGHTLY
Qty: 30 TABLET | Refills: 2 | Status: SHIPPED | OUTPATIENT
Start: 2023-01-12

## 2023-01-12 RX ORDER — AMITRIPTYLINE HYDROCHLORIDE 10 MG/1
10 TABLET, FILM COATED ORAL NIGHTLY
Qty: 30 TABLET | Refills: 1 | Status: SHIPPED | OUTPATIENT
Start: 2023-01-12

## 2023-01-12 ASSESSMENT — PATIENT HEALTH QUESTIONNAIRE - PHQ9
3. TROUBLE FALLING OR STAYING ASLEEP: 2
1. LITTLE INTEREST OR PLEASURE IN DOING THINGS: 3
7. TROUBLE CONCENTRATING ON THINGS, SUCH AS READING THE NEWSPAPER OR WATCHING TELEVISION: 3
2. FEELING DOWN, DEPRESSED OR HOPELESS: 2
SUM OF ALL RESPONSES TO PHQ QUESTIONS 1-9: 13
6. FEELING BAD ABOUT YOURSELF - OR THAT YOU ARE A FAILURE OR HAVE LET YOURSELF OR YOUR FAMILY DOWN: 0
SUM OF ALL RESPONSES TO PHQ QUESTIONS 1-9: 13
SUM OF ALL RESPONSES TO PHQ QUESTIONS 1-9: 13
10. IF YOU CHECKED OFF ANY PROBLEMS, HOW DIFFICULT HAVE THESE PROBLEMS MADE IT FOR YOU TO DO YOUR WORK, TAKE CARE OF THINGS AT HOME, OR GET ALONG WITH OTHER PEOPLE: 3
5. POOR APPETITE OR OVEREATING: 0
SUM OF ALL RESPONSES TO PHQ9 QUESTIONS 1 & 2: 5
9. THOUGHTS THAT YOU WOULD BE BETTER OFF DEAD, OR OF HURTING YOURSELF: 0
8. MOVING OR SPEAKING SO SLOWLY THAT OTHER PEOPLE COULD HAVE NOTICED. OR THE OPPOSITE, BEING SO FIGETY OR RESTLESS THAT YOU HAVE BEEN MOVING AROUND A LOT MORE THAN USUAL: 2
4. FEELING TIRED OR HAVING LITTLE ENERGY: 1
SUM OF ALL RESPONSES TO PHQ QUESTIONS 1-9: 13

## 2023-01-12 ASSESSMENT — MINI MENTAL STATE EXAM
SAY: I WOULD LIKE YOU TO COUNT BACKWARD FROM 100 BY SEVENS: 1
WHAT DAY OF THE WEEK IS THIS?: 1
SHOW: WRISTWATCH [OBJECT] ASK: WHAT IS THIS CALLED?: 1
WHAT FLOOR ARE WE ON [IN FACILITY]?/ WHAT ROOM ARE WE IN [IN HOME]?: 1
SUM ALL MMSE QUESTIONS FOR TOTAL SCORE [OUT OF 30].: 24
WHICH SEASON IS THIS?: 1
WHAT STATE [OR PROVINCE] ARE WE IN?: 1
SAY: READ THE WORDS ON THE PAGE AND THEN DO WHAT IT SAYS. THEN HAND THE PERSON
THE SHEET WITH CLOSE YOUR EYES ON IT. IF THE SUBJECT READS AND DOES NOT CLOSE THEIR EYES, REPEAT UP TO THREE TIMES. SCORE ONLY IF SUBJECT CLOSES EYES.: 1
HAND THE PERSON A PENCIL AND PAPER. SAY: WRITE ANY COMPLETE SENTENCE ON THAT
PIECE OF PAPER. (NOTE: THE SENTENCE MUST MAKE SENSE. IGNORE SPELLING ERRORS): 1
NOW WHAT WERE THE THREE OBJECTS I ASKED YOU TO REMEMBER?: 2
WHAT IS TODAY'S DATE?: 0
SAY: I WOULD LIKE YOU TO REPEAT THIS PHRASE AFTER ME: NO IFS, ANDS, OR BUTS.: 1
SAY: I AM GOING TO NAME THREE OBJECTS. WHEN I AM FINISHED, I WANT YOU TO REPEAT
THEM. REMEMBER WHAT THEY ARE BECAUSE I AM GOING TO ASK YOU TO NAME THEM AGAIN IN
A FEW MINUTES.  SAY THE FOLLOWING WORDS SLOWLY AT 1-SECOND INTERVALS - BALL/ CAR/ MAN [ITERATIONS FOR REPEAT ADMINISTRATION]: 3
WHAT YEAR IS THIS?: 1
SAY: FOLD THE PAPER IN HALF ONCE WITH BOTH HANDS, SCORE IF PAPER IS CORRECTLY FOLDED IN HALF.: 1
SAY: PUT THE PAPER DOWN ON THE FLOOR, SCORE IF PAPER IS PLACED BACK ON FLOOR: 1
PLACE DESIGN, ERASER AND PENCIL IN FRONT OF THE PERSON.  SAY:  COPY THIS DESIGN PLEASE.  SHOW: DESIGN. ALLOW: MULTIPLE TRIES. WAIT UNTIL PERSON IS FINISHED AND HANDS IT BACK. SCORE: ONLY FOR DIAGRAM WITH 4-SIDED FIGURE BETWEEN TWO 5-SIDED FIGURES: 1
WHAT IS THE NAME OF THIS BUILDING [IN FACILITY]?/WHAT IS THE STREET ADDRESS OF THIS HOUSE [IN HOME]?: 1
ASK THE PERSON IF HE IS RIGHT OR LEFT-HANDED. TAKE A PIECE OF PAPER AND HOLD IT UP IN
FRONT OF THE PERSON. SAY: TAKE THIS PAPER IN YOUR RIGHT/LEFT HAND (WHICHEVER IS NON-
DOMINANT), SCORE IF PAPER IS PICKED UP IN CORRECT HAND.: 1
SHOW: PENCIL [OBJECT] ASK: WHAT IS THIS CALLED?: 1
WHAT CITY/TOWN ARE WE IN?: 1
WHAT COUNTRY ARE WE IN?: 1
WHAT MONTH IS THIS?: 1

## 2023-01-12 NOTE — PROGRESS NOTES
[address] [place] [address]  [address] [facility] [facility] [address]  Dept: [phone]  Dept Fax: [phone]  Loc: [phone]    Milton Velasquez is a 63 y.o. male who presents today for his medical conditions/complaints as noted below. Milton Velasquez is c/o of Memory Loss       Subjective:     Chief Complaint   Patient presents with    Memory Loss       HPI   Milton Velasquez is a 63 y.o. male here for evaluation of memory problems. He is accompanied by no one. Primary caregiver is patient. Patient lives with their spouse. The family and the patient identify problems with changes in short term memory, getting disoriented outside of familiar environment, and recalling words. Family and patient denies problems with paranoia, suspiciousness, hallucinations, delusional thinking, but has agitation at times. Family and patient are not concerned about medication errors, wandering, driving, cooking, and inability to maintain adequate nutrition. Medication administration: wife sets up patients medications.       MMSE    Mini Mental State Exam MMSE 1/12/2023   What is the Year 1   What is the Season 1   What is the Date 0   What is the Day 1   What is the Month 1   Where are we State 1   Where are we Country 1   Where are we [address] [place] [address] or [place] 1   Where are we Hospital 1   Where are we Floor 1   Name three objects, then ask the patient to say them 3   Serial sevens Subtract 7 from 100 in increments 1   Ask for the three objects repeated above 2   Name a pencil 1   Name a watch 1   Have the patient repeat this phrase \"No ifs, ands, or buts\" 1   Three stage command: Take the paper in your right hand 1   Fold the paper in half 1   Put the paper on the floor 1   Read and obey the following: CLOSE YOUR EYES 1   Have the patient write a sentence 1   Have the patient copy a figure 1   Mini Mental Score 24       MMSE on 1/12/2023: 24/30  (Interpretation: >25 normal, 21-24 mild, 10-20 moderate, 9 or less severe cognitive impairment)    Ladarius test link        Past Medical History:   Diagnosis Date    Acute diverticulitis 04/10/2021    Acute kidney injury ([address] 04/08/2021    Acute on chronic respiratory failure with hypoxemia (HCC)     Acute respiratory failure ([address] 08/19/2020    Acute respiratory failure with hypoxia ([address]     Anxiety 01/06/2020    Aortic valve calcification 09/2020    seen on lung CT    CAD in native artery 04/14/2021    Chronic obstructive pulmonary disease ([address] 09/03/2019    PFT done 9/03/19    Chronic systolic congestive heart failure ([address] 04/10/2021    Class 3 severe obesity with body mass index (BMI) of 45.0 to 49.9 in adult [facility])     Coronary artery calcification 09/2020    seen on lung ct    Coronary artery disease involving native heart with angina pectoris ([address] 09/22/2020    Crush injury of right foot 07/23/2015    DDD (degenerative disc disease), lumbar 01/06/2020    Depression 01/06/2020    Diverticulitis of colon 04/10/2021    Erectile dysfunction 01/06/2020    Fatigue 01/06/2020    HNP (herniated nucleus pulposus), lumbar 01/06/2020    Hyperglycemia 01/06/2020    Hyperlipidemia     Hypersomnia 01/06/2020    Hypertension     Insomnia     Ischemic cardiomyopathy     Kidney stone     Lumbar radiculopathy 01/06/2020    Lumbar spine pain 01/06/2020    LVH (left ventricular hypertrophy)     seen on echo 8/2020, moderate    Mobitz type I Wenckebach atrioventricular block 04/10/2021    Moderate protein-calorie malnutrition ([address] 03/17/2020    Observed sleep apnea 01/06/2020    OANH (obstructive sleep apnea) 01/06/2020    no C-pap is getting tested    Pneumonia, primary atypical     Reactive depression 01/06/2020    S/P three vessel coronary artery bypass 10/26/2020    Spondylosis without myelopathy or radiculopathy, lumbar region 01/06/2020    Tobacco abuse 01/06/2020    Tobacco use 01/06/2020    Wears dentures     full set         Review of Systems   Constitutional:  Positive for fatigue. Negative for appetite change and unexpected weight change. HENT: Negative. Eyes: Negative. Respiratory:  Positive for chest tightness and shortness of breath. Cardiovascular: Negative. Negative for chest pain, palpitations and leg swelling. Gastrointestinal: Negative. Negative for abdominal pain, anal bleeding, blood in stool and nausea. Endocrine: Negative. Genitourinary: Negative. Negative for hematuria. Musculoskeletal: Negative. Skin: Negative. Negative for rash. Allergic/Immunologic: Negative. Neurological:  Positive for headaches. Negative for dizziness, syncope, light-headedness and numbness. Hematological: Negative. Does not bruise/bleed easily. Psychiatric/Behavioral:  Positive for decreased concentration, dysphoric mood and sleep disturbance. Negative for agitation. The patient is nervous/anxious. Difficulty with memory   All other systems reviewed and are negative.      Current Outpatient Medications   Medication Sig Dispense Refill    metOLazone (ZAROXOLYN) 5 MG tablet Take 1 tablet by mouth once a week 30 minutes before taking Lasix 30 tablet 1    metoprolol succinate (TOPROL XL) 25 MG extended release tablet TAKE ONE TABLET BY MOUTH DAILY 90 tablet 1    ondansetron (ZOFRAN) 4 MG tablet TAKE ONE TABLET BY MOUTH DAILY AS NEEDED FOR NAUSEA AND VOMITING 30 tablet 0    ranolazine (RANEXA) 500 MG extended release tablet Take 1 tablet by mouth 2 times daily 60 tablet 2    isosorbide mononitrate (IMDUR) 120 MG extended release tablet Take 1 tablet by mouth daily 30 tablet 3    rOPINIRole (REQUIP) 0.5 MG tablet Take 1 tablet by mouth at bedtime 30 tablet 1    Cholecalciferol (VITAMIN D3) 1.25 MG (27281 UT) CAPS TAKE ONE CAPSULE BY MOUTH ONCE WEEKLY 4 capsule 5    fluticasone-umeclidin-vilant (TRELEGY ELLIPTA) 100-62.5-25 MCG/INH AEPB Inhale 1 puff into the lungs daily 3 each 1    albuterol (PROVENTIL) (2.5 MG/3ML) 0.083% nebulizer solution Take 3 mLs by nebulization every 6 hours as needed for Wheezing or Shortness of Breath 360 each 5    albuterol sulfate HFA (PROVENTIL HFA) 108 (90 Base) MCG/ACT inhaler Inhale 2 puffs into the lungs every 4 hours as needed for Wheezing or Shortness of Breath 3 each 1    benzonatate (TESSALON) 200 MG capsule TAKE ONE CAPSULE BY MOUTH THREE TIMES A DAY AS NEEDED FOR COUGH 30 capsule 1    lansoprazole (PREVACID) 30 MG delayed release capsule TAKE ONE CAPSULE BY MOUTH DAILY 30 capsule 1    Omega-3 Fatty Acids (FISH OIL) 1000 MG CAPS Take 2 capsules by mouth 2 times daily 90 capsule 3    clopidogrel (PLAVIX) 75 MG tablet Take 1 tablet by mouth daily 90 tablet 3    furosemide (LASIX) 40 MG tablet Take 1 tablet by mouth as needed (for weight grater than 300lbs) (Patient taking differently: Take 80 mg by mouth 2 times daily) 90 tablet 3    atorvastatin (LIPITOR) 80 MG tablet Take 1 tablet by mouth nightly 90 tablet 3    Multiple Vitamins-Minerals (THERAPEUTIC MULTIVITAMIN-MINERALS) tablet Take 1 tablet by mouth daily      traZODone (DESYREL) 100 MG tablet Take 1 tablet by mouth 2 times daily Am pm 60 tablet 5    OXYGEN Inhale 2 L into the lungs as needed       nitroGLYCERIN (NITROSTAT) 0.4 MG SL tablet Place 1 tablet under the tongue every 5 minutes as needed for Chest pain 25 tablet 3    aspirin 81 MG EC tablet Take 1 tablet by mouth daily 30 tablet 0     No current facility-administered medications for this visit. No changes in past medical history, past surgical history, social history, orfamily history were noted during the patient encounter unless specifically listed above. All updates of past medical history, past surgical history, social history, or family history were reviewed personally by me duringthe office visit. All problems listed in the assessment are stable unless noted otherwise. Medication profile reviewed personally by me during the office visit.   Medication side effects and possible impairments frommedications were discussed as applicable. Objective:     Physical Exam    /64 (Site: Left Upper Arm, Position: Sitting, Cuff Size: Large Adult)   Pulse 84   Ht 5' 4\" (1.626 m)   Wt (!) 308 lb (139.7 kg)   SpO2 (!) 67%   BMI 52.87 kg/m²   Body mass index is 52.87 kg/m².     BP Readings from Last 2 Encounters:   01/12/23 118/64   01/05/23 108/70       Wt Readings from Last 3 Encounters:   01/12/23 (!) 308 lb (139.7 kg)   01/05/23 (!) 308 lb (139.7 kg)   12/05/22 300 lb (136.1 kg)       Lab Review   Hospital Outpatient Visit on 01/06/2023   Component Date Value    Pro-BNP 01/06/2023 68     Sodium 01/06/2023 139     Potassium 01/06/2023 4.5     Chloride 01/06/2023 101     CO2 01/06/2023 29     Anion Gap 01/06/2023 9     Glucose 01/06/2023 177 (A)     BUN 01/06/2023 17     Creatinine 01/06/2023 0.9     Est, Glom Filt Rate 01/06/2023 >60     Calcium 01/06/2023 9.4    Hospital Outpatient Visit on 12/22/2022   Component Date Value    Left Ventricular Ejectio* 12/23/2022 49     LVEF MODALITY 12/23/2022 Nuclear    Hospital Outpatient Visit on 12/05/2022   Component Date Value    Cholesterol, Total 12/05/2022 90     Triglycerides 12/05/2022 140     HDL 12/05/2022 29 (A)     LDL Calculated 12/05/2022 33     VLDL Cholesterol Calcula* 12/05/2022 28    Office Visit on 11/23/2022   Component Date Value    Vitamin B-12 11/23/2022 497     Folate 11/23/2022 11.65     Ferritin 11/23/2022 55.8     Magnesium 11/23/2022 2.00    Hospital Outpatient Visit on 11/22/2022   Component Date Value    Pro-BNP 11/22/2022 54     TSH Reflex FT4 11/22/2022 1.24     Sodium 11/22/2022 139     Potassium 11/22/2022 4.3     Chloride 11/22/2022 98 (A)     CO2 11/22/2022 33 (A)     Anion Gap 11/22/2022 8     Glucose 11/22/2022 99     BUN 11/22/2022 17     Creatinine 11/22/2022 0.8     Est, Glom Filt Rate 11/22/2022 >60     Calcium 11/22/2022 9.5     WBC 11/22/2022 11.6 (A)     RBC 11/22/2022 5.30     Hemoglobin 11/22/2022 15.0     Hematocrit 11/22/2022 45.6 MCV 11/22/2022 86.1     MCH 11/22/2022 28.2     MCHC 11/22/2022 32.8     RDW 11/22/2022 15.4     Platelets 29/09/4737 182     MPV 11/22/2022 7.4     Neutrophils % 11/22/2022 74.5     Lymphocytes % 11/22/2022 13.3     Monocytes % 11/22/2022 7.8     Eosinophils % 11/22/2022 3.6     Basophils % 11/22/2022 0.8     Neutrophils Absolute 11/22/2022 8.6 (A)     Lymphocytes Absolute 11/22/2022 1.5     Monocytes Absolute 11/22/2022 0.9     Eosinophils Absolute 11/22/2022 0.4     Basophils Absolute 11/22/2022 0.1        No results found for this visit on 01/12/23. Assessment:       No diagnosis found. No results found for this visit on 01/12/23. Plan:       Taina Kamara was seen today for memory loss. Diagnoses and all orders for this visit:    Memory problem  OANH (obstructive sleep apnea)  Restless leg  Reactive depression  Anxiety  Fatigue, unspecified type  Shortness of breath    MMSE on 1/12/2023: 24/30  (Interpretation: >25 normal, 21-24 mild, 10-20 moderate, 9 or less severe cognitive impairment)    Ladarius test link        The patient has had a lot of changes over the last couple years. 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 He also has depression and is no longer taking depression medication or seeing a psychiatrist.    1/24/23 having heart cath due to 4 vessels behind his heart are blocked again. The patient has been told that since he has had repair in this area before they may not be able to repair and may be scheduled for open heart surgery instead    The patient does have obstructive sleep apnea and is not using his CPAP because he states he is not able to tolerate any type of CPAP device. He does wake up with headaches every day. He states that he also gets a lot of headaches throughout the day from stress. The patient has had prior imaging of the brain that was negative. The patient also has restless leg syndrome.     The patient also is not using trazodone any longer for sleep  He states that since starting the Requip 0.5 mg for restless leg he is having less leg restlessness and pain.  States he is sleeping somewhat better but continues to have restless leg symptoms and will wake up in the morning with a headache  Trial of   -     rOPINIRole (REQUIP) 1 MG tablet; Take 1 tablet by mouth nightly  -     amitriptyline (ELAVIL) 10 MG tablet; Take 1 tablet by mouth nightly     I did educate him on the relationship between uncontrolled sleep apnea and fatigue, mood, memory, RLS and insomnia.  Again the patient stated he was not able to tolerate his CPAP.      Consider neuropsych consult  Encouraged to go back to psychiatry  Patient wants to wait until after heart surgery to take next step    Patient has been instructed call the office immediately with new symptoms, change in symptoms or worseningof symptoms. If this is not feasible, patient is instructed to report to the emergency room. Medication profile reviewed. Medication side effects and possible impairments from medications were discussed as applicable.Allergies were reviewed. Health maintenance was reviewed and updated as appropriate.     Return in about 1 month (around 2/12/2023).    (Comment: Please note this report has been produced using a combination of typing and speech recognition software and may contain errors related to that system including errors in grammar, punctuation, and spelling, as well as words and phrases that may be inappropriate. If there are any questions or concerns please feel free to contact the dictating provider for clarification.)

## 2023-01-13 ENCOUNTER — TELEPHONE (OUTPATIENT)
Dept: CARDIOLOGY CLINIC | Age: 63
End: 2023-01-13

## 2023-01-13 ENCOUNTER — HOSPITAL ENCOUNTER (OUTPATIENT)
Age: 63
Discharge: HOME | End: 2023-01-13
Payer: COMMERCIAL

## 2023-01-13 DIAGNOSIS — I50.31 ACUTE DIASTOLIC CONGESTIVE HEART FAILURE (HCC): ICD-10-CM

## 2023-01-13 LAB
ANION GAP SERPL CALCULATED.3IONS-SCNC: 10 MMOL/L (ref 3–16)
BUN BLDV-MCNC: 18 MG/DL (ref 7–20)
CALCIUM SERPL-MCNC: 9.6 MG/DL (ref 8.3–10.6)
CHLORIDE BLD-SCNC: 101 MMOL/L (ref 99–110)
CO2: 30 MMOL/L (ref 21–32)
CREAT SERPL-MCNC: 0.9 MG/DL (ref 0.8–1.3)
GFR SERPL CREATININE-BSD FRML MDRD: >60 ML/MIN/{1.73_M2}
GLUCOSE BLD-MCNC: 185 MG/DL (ref 70–99)
POTASSIUM SERPL-SCNC: 4.2 MMOL/L (ref 3.5–5.1)
SODIUM BLD-SCNC: 141 MMOL/L (ref 136–145)

## 2023-01-13 PROCEDURE — 36415 COLL VENOUS BLD VENIPUNCTURE: CPT

## 2023-01-13 PROCEDURE — 80048 BASIC METABOLIC PNL TOTAL CA: CPT

## 2023-01-13 NOTE — TELEPHONE ENCOUNTER
----- Message from Kaylene Gardner MD sent at 1/13/2023 10:04 AM EST -----  Let patient know their bmp test shows high glucose, rec: f/u w/ pcp for that. Thanks.

## 2023-01-17 ENCOUNTER — ANESTHESIA EVENT (OUTPATIENT)
Dept: CARDIAC CATH/INVASIVE PROCEDURES | Age: 63
DRG: 191 | End: 2023-01-17
Payer: COMMERCIAL

## 2023-01-17 ASSESSMENT — ENCOUNTER SYMPTOMS
CHEST TIGHTNESS: 1
EYES NEGATIVE: 1
GASTROINTESTINAL NEGATIVE: 1
ABDOMINAL PAIN: 0
ALLERGIC/IMMUNOLOGIC NEGATIVE: 1
ANAL BLEEDING: 0
NAUSEA: 0
BLOOD IN STOOL: 0
SHORTNESS OF BREATH: 1

## 2023-01-23 DIAGNOSIS — G25.81 RESTLESS LEG: ICD-10-CM

## 2023-01-23 RX ORDER — ROPINIROLE 0.5 MG/1
TABLET, FILM COATED ORAL
Qty: 30 TABLET | Refills: 1 | OUTPATIENT
Start: 2023-01-23

## 2023-01-24 ENCOUNTER — NURSE ONLY (OUTPATIENT)
Dept: CARDIOLOGY CLINIC | Age: 63
End: 2023-01-24
Payer: COMMERCIAL

## 2023-01-24 ENCOUNTER — ANESTHESIA (OUTPATIENT)
Dept: CARDIAC CATH/INVASIVE PROCEDURES | Age: 63
DRG: 191 | End: 2023-01-24
Payer: COMMERCIAL

## 2023-01-24 ENCOUNTER — HOSPITAL ENCOUNTER (INPATIENT)
Dept: CARDIAC CATH/INVASIVE PROCEDURES | Age: 63
LOS: 1 days | Discharge: ANOTHER HOSPITAL | DRG: 191 | End: 2023-01-24
Attending: INTERNAL MEDICINE | Admitting: INTERNAL MEDICINE
Payer: COMMERCIAL

## 2023-01-24 VITALS
SYSTOLIC BLOOD PRESSURE: 144 MMHG | HEART RATE: 106 BPM | BODY MASS INDEX: 51.34 KG/M2 | OXYGEN SATURATION: 98 % | RESPIRATION RATE: 28 BRPM | HEIGHT: 64 IN | DIASTOLIC BLOOD PRESSURE: 98 MMHG | TEMPERATURE: 97 F | WEIGHT: 300.7 LBS

## 2023-01-24 DIAGNOSIS — R00.1 SINUS BRADYCARDIA: ICD-10-CM

## 2023-01-24 DIAGNOSIS — I44.1 MOBITZ TYPE I WENCKEBACH ATRIOVENTRICULAR BLOCK: Primary | ICD-10-CM

## 2023-01-24 DIAGNOSIS — I49.5 SSS (SICK SINUS SYNDROME) (HCC): ICD-10-CM

## 2023-01-24 DIAGNOSIS — Z95.0 PACEMAKER: ICD-10-CM

## 2023-01-24 PROBLEM — I25.10 CAD IN NATIVE ARTERY: Status: ACTIVE | Noted: 2023-01-24

## 2023-01-24 LAB
ANION GAP SERPL CALCULATED.3IONS-SCNC: 12 MMOL/L (ref 3–16)
BUN BLDV-MCNC: 20 MG/DL (ref 7–20)
CALCIUM SERPL-MCNC: 10.1 MG/DL (ref 8.3–10.6)
CHLORIDE BLD-SCNC: 88 MMOL/L (ref 99–110)
CHOLESTEROL, TOTAL: 92 MG/DL (ref 0–199)
CO2: 34 MMOL/L (ref 21–32)
CREAT SERPL-MCNC: 1 MG/DL (ref 0.8–1.3)
GFR SERPL CREATININE-BSD FRML MDRD: >60 ML/MIN/{1.73_M2}
GLUCOSE BLD-MCNC: 180 MG/DL (ref 70–99)
HCT VFR BLD CALC: 51.5 % (ref 40.5–52.5)
HDLC SERPL-MCNC: 35 MG/DL (ref 40–60)
HEMOGLOBIN: 17 G/DL (ref 13.5–17.5)
INR BLD: 1.06 (ref 0.87–1.14)
LDL CHOLESTEROL CALCULATED: 29 MG/DL
LV EF: 38 %
LVEF MODALITY: NORMAL
MCH RBC QN AUTO: 28.7 PG (ref 26–34)
MCHC RBC AUTO-ENTMCNC: 33 G/DL (ref 31–36)
MCV RBC AUTO: 87 FL (ref 80–100)
PDW BLD-RTO: 15.4 % (ref 12.4–15.4)
PLATELET # BLD: 233 K/UL (ref 135–450)
PMV BLD AUTO: 7.6 FL (ref 5–10.5)
POTASSIUM REFLEX MAGNESIUM: 3.6 MMOL/L (ref 3.5–5.1)
PROTHROMBIN TIME: 13.8 SEC (ref 11.7–14.5)
RBC # BLD: 5.92 M/UL (ref 4.2–5.9)
SODIUM BLD-SCNC: 134 MMOL/L (ref 136–145)
TRIGL SERPL-MCNC: 140 MG/DL (ref 0–150)
VLDLC SERPL CALC-MCNC: 28 MG/DL
WBC # BLD: 16.2 K/UL (ref 4–11)

## 2023-01-24 PROCEDURE — 93459 L HRT ART/GRFT ANGIO: CPT

## 2023-01-24 PROCEDURE — 6360000002 HC RX W HCPCS

## 2023-01-24 PROCEDURE — 6370000000 HC RX 637 (ALT 250 FOR IP)

## 2023-01-24 PROCEDURE — 7100000001 HC PACU RECOVERY - ADDTL 15 MIN

## 2023-01-24 PROCEDURE — 3700000000 HC ANESTHESIA ATTENDED CARE

## 2023-01-24 PROCEDURE — 6360000004 HC RX CONTRAST MEDICATION

## 2023-01-24 PROCEDURE — 2580000003 HC RX 258

## 2023-01-24 PROCEDURE — 99152 MOD SED SAME PHYS/QHP 5/>YRS: CPT | Performed by: INTERNAL MEDICINE

## 2023-01-24 PROCEDURE — 93288 INTERROG EVL PM/LDLS PM IP: CPT | Performed by: INTERNAL MEDICINE

## 2023-01-24 PROCEDURE — 80061 LIPID PANEL: CPT

## 2023-01-24 PROCEDURE — 4A023N7 MEASUREMENT OF CARDIAC SAMPLING AND PRESSURE, LEFT HEART, PERCUTANEOUS APPROACH: ICD-10-PCS | Performed by: INTERNAL MEDICINE

## 2023-01-24 PROCEDURE — 85027 COMPLETE CBC AUTOMATED: CPT

## 2023-01-24 PROCEDURE — B2151ZZ FLUOROSCOPY OF LEFT HEART USING LOW OSMOLAR CONTRAST: ICD-10-PCS | Performed by: INTERNAL MEDICINE

## 2023-01-24 PROCEDURE — C1894 INTRO/SHEATH, NON-LASER: HCPCS

## 2023-01-24 PROCEDURE — 7100000000 HC PACU RECOVERY - FIRST 15 MIN

## 2023-01-24 PROCEDURE — 2709999900 HC NON-CHARGEABLE SUPPLY

## 2023-01-24 PROCEDURE — 85610 PROTHROMBIN TIME: CPT

## 2023-01-24 PROCEDURE — B2111ZZ FLUOROSCOPY OF MULTIPLE CORONARY ARTERIES USING LOW OSMOLAR CONTRAST: ICD-10-PCS | Performed by: INTERNAL MEDICINE

## 2023-01-24 PROCEDURE — 80048 BASIC METABOLIC PNL TOTAL CA: CPT

## 2023-01-24 PROCEDURE — B2131ZZ FLUOROSCOPY OF MULTIPLE CORONARY ARTERY BYPASS GRAFTS USING LOW OSMOLAR CONTRAST: ICD-10-PCS | Performed by: INTERNAL MEDICINE

## 2023-01-24 PROCEDURE — 93005 ELECTROCARDIOGRAM TRACING: CPT | Performed by: INTERNAL MEDICINE

## 2023-01-24 PROCEDURE — 1200000000 HC SEMI PRIVATE

## 2023-01-24 PROCEDURE — 3700000001 HC ADD 15 MINUTES (ANESTHESIA)

## 2023-01-24 PROCEDURE — 93459 L HRT ART/GRFT ANGIO: CPT | Performed by: INTERNAL MEDICINE

## 2023-01-24 PROCEDURE — B2181ZZ FLUOROSCOPY OF LEFT INTERNAL MAMMARY BYPASS GRAFT USING LOW OSMOLAR CONTRAST: ICD-10-PCS | Performed by: INTERNAL MEDICINE

## 2023-01-24 PROCEDURE — C1769 GUIDE WIRE: HCPCS

## 2023-01-24 PROCEDURE — 2500000003 HC RX 250 WO HCPCS

## 2023-01-24 RX ORDER — SODIUM CHLORIDE 0.9 % (FLUSH) 0.9 %
5-40 SYRINGE (ML) INJECTION PRN
OUTPATIENT
Start: 2023-01-24

## 2023-01-24 RX ORDER — SODIUM CHLORIDE, SODIUM LACTATE, POTASSIUM CHLORIDE, CALCIUM CHLORIDE 600; 310; 30; 20 MG/100ML; MG/100ML; MG/100ML; MG/100ML
INJECTION, SOLUTION INTRAVENOUS CONTINUOUS PRN
Status: DISCONTINUED | OUTPATIENT
Start: 2023-01-24 | End: 2023-01-24 | Stop reason: SDUPTHER

## 2023-01-24 RX ORDER — SODIUM CHLORIDE 0.9 % (FLUSH) 0.9 %
5-40 SYRINGE (ML) INJECTION EVERY 12 HOURS SCHEDULED
Status: DISCONTINUED | OUTPATIENT
Start: 2023-01-24 | End: 2023-01-24 | Stop reason: HOSPADM

## 2023-01-24 RX ORDER — PHENYLEPHRINE HCL IN 0.9% NACL 1 MG/10 ML
SYRINGE (ML) INTRAVENOUS PRN
Status: DISCONTINUED | OUTPATIENT
Start: 2023-01-24 | End: 2023-01-24 | Stop reason: SDUPTHER

## 2023-01-24 RX ORDER — MEPERIDINE HYDROCHLORIDE 50 MG/ML
12.5 INJECTION INTRAMUSCULAR; INTRAVENOUS; SUBCUTANEOUS EVERY 5 MIN PRN
OUTPATIENT
Start: 2023-01-24

## 2023-01-24 RX ORDER — ACETAMINOPHEN 325 MG/1
650 TABLET ORAL EVERY 4 HOURS PRN
OUTPATIENT
Start: 2023-01-24

## 2023-01-24 RX ORDER — OXYCODONE HYDROCHLORIDE 5 MG/1
5 TABLET ORAL PRN
OUTPATIENT
Start: 2023-01-24 | End: 2023-01-24

## 2023-01-24 RX ORDER — ONDANSETRON 2 MG/ML
INJECTION INTRAMUSCULAR; INTRAVENOUS PRN
Status: DISCONTINUED | OUTPATIENT
Start: 2023-01-24 | End: 2023-01-24 | Stop reason: SDUPTHER

## 2023-01-24 RX ORDER — ASPIRIN 81 MG/1
81 TABLET, CHEWABLE ORAL ONCE
Status: COMPLETED | OUTPATIENT
Start: 2023-01-24 | End: 2023-01-24

## 2023-01-24 RX ORDER — SUCCINYLCHOLINE CHLORIDE 20 MG/ML
INJECTION INTRAMUSCULAR; INTRAVENOUS PRN
Status: DISCONTINUED | OUTPATIENT
Start: 2023-01-24 | End: 2023-01-24 | Stop reason: SDUPTHER

## 2023-01-24 RX ORDER — SODIUM CHLORIDE 0.9 % (FLUSH) 0.9 %
5-40 SYRINGE (ML) INJECTION PRN
Status: DISCONTINUED | OUTPATIENT
Start: 2023-01-24 | End: 2023-01-24 | Stop reason: HOSPADM

## 2023-01-24 RX ORDER — LORAZEPAM 0.5 MG/1
0.5 TABLET ORAL
Status: DISCONTINUED | OUTPATIENT
Start: 2023-01-24 | End: 2023-01-24 | Stop reason: HOSPADM

## 2023-01-24 RX ORDER — FENTANYL CITRATE 50 UG/ML
50 INJECTION, SOLUTION INTRAMUSCULAR; INTRAVENOUS ONCE
Status: COMPLETED | OUTPATIENT
Start: 2023-01-24 | End: 2023-01-24

## 2023-01-24 RX ORDER — DIPHENHYDRAMINE HYDROCHLORIDE 50 MG/ML
12.5 INJECTION INTRAMUSCULAR; INTRAVENOUS
OUTPATIENT
Start: 2023-01-24 | End: 2023-01-25

## 2023-01-24 RX ORDER — SODIUM CHLORIDE 9 MG/ML
INJECTION, SOLUTION INTRAVENOUS PRN
Status: DISCONTINUED | OUTPATIENT
Start: 2023-01-24 | End: 2023-01-24 | Stop reason: HOSPADM

## 2023-01-24 RX ORDER — ROCURONIUM BROMIDE 10 MG/ML
INJECTION, SOLUTION INTRAVENOUS PRN
Status: DISCONTINUED | OUTPATIENT
Start: 2023-01-24 | End: 2023-01-24 | Stop reason: SDUPTHER

## 2023-01-24 RX ORDER — ASPIRIN 325 MG
325 TABLET ORAL ONCE
Status: DISCONTINUED | OUTPATIENT
Start: 2023-01-24 | End: 2023-01-24

## 2023-01-24 RX ORDER — SODIUM CHLORIDE 9 MG/ML
INJECTION, SOLUTION INTRAVENOUS PRN
OUTPATIENT
Start: 2023-01-24

## 2023-01-24 RX ORDER — SODIUM CHLORIDE 0.9 % (FLUSH) 0.9 %
5-40 SYRINGE (ML) INJECTION EVERY 12 HOURS SCHEDULED
OUTPATIENT
Start: 2023-01-24

## 2023-01-24 RX ORDER — DEXAMETHASONE SODIUM PHOSPHATE 4 MG/ML
INJECTION, SOLUTION INTRA-ARTICULAR; INTRALESIONAL; INTRAMUSCULAR; INTRAVENOUS; SOFT TISSUE PRN
Status: DISCONTINUED | OUTPATIENT
Start: 2023-01-24 | End: 2023-01-24 | Stop reason: SDUPTHER

## 2023-01-24 RX ORDER — ONDANSETRON 2 MG/ML
4 INJECTION INTRAMUSCULAR; INTRAVENOUS EVERY 6 HOURS PRN
Status: DISCONTINUED | OUTPATIENT
Start: 2023-01-24 | End: 2023-01-24 | Stop reason: HOSPADM

## 2023-01-24 RX ORDER — LIDOCAINE HYDROCHLORIDE 20 MG/ML
INJECTION, SOLUTION EPIDURAL; INFILTRATION; INTRACAUDAL; PERINEURAL PRN
Status: DISCONTINUED | OUTPATIENT
Start: 2023-01-24 | End: 2023-01-24 | Stop reason: SDUPTHER

## 2023-01-24 RX ORDER — SODIUM CHLORIDE, SODIUM LACTATE, POTASSIUM CHLORIDE, CALCIUM CHLORIDE 600; 310; 30; 20 MG/100ML; MG/100ML; MG/100ML; MG/100ML
INJECTION, SOLUTION INTRAVENOUS CONTINUOUS
Status: DISCONTINUED | OUTPATIENT
Start: 2023-01-24 | End: 2023-01-24 | Stop reason: HOSPADM

## 2023-01-24 RX ORDER — FAMOTIDINE 10 MG/ML
20 INJECTION, SOLUTION INTRAVENOUS ONCE
Status: DISCONTINUED | OUTPATIENT
Start: 2023-01-24 | End: 2023-01-24 | Stop reason: HOSPADM

## 2023-01-24 RX ORDER — LABETALOL HYDROCHLORIDE 5 MG/ML
5 INJECTION, SOLUTION INTRAVENOUS EVERY 10 MIN PRN
OUTPATIENT
Start: 2023-01-24

## 2023-01-24 RX ORDER — PROPOFOL 10 MG/ML
INJECTION, EMULSION INTRAVENOUS PRN
Status: DISCONTINUED | OUTPATIENT
Start: 2023-01-24 | End: 2023-01-24 | Stop reason: SDUPTHER

## 2023-01-24 RX ORDER — ONDANSETRON 2 MG/ML
4 INJECTION INTRAMUSCULAR; INTRAVENOUS
OUTPATIENT
Start: 2023-01-24

## 2023-01-24 RX ORDER — OXYCODONE HYDROCHLORIDE 5 MG/1
10 TABLET ORAL PRN
OUTPATIENT
Start: 2023-01-24 | End: 2023-01-24

## 2023-01-24 RX ADMIN — SUGAMMADEX 200 MG: 100 INJECTION, SOLUTION INTRAVENOUS at 09:58

## 2023-01-24 RX ADMIN — SUCCINYLCHOLINE CHLORIDE 160 MG: 20 INJECTION, SOLUTION INTRAMUSCULAR; INTRAVENOUS at 08:44

## 2023-01-24 RX ADMIN — DEXAMETHASONE SODIUM PHOSPHATE 4 MG: 4 INJECTION, SOLUTION INTRAMUSCULAR; INTRAVENOUS at 08:44

## 2023-01-24 RX ADMIN — Medication 100 MCG: at 08:49

## 2023-01-24 RX ADMIN — ONDANSETRON 4 MG: 2 INJECTION INTRAMUSCULAR; INTRAVENOUS at 08:44

## 2023-01-24 RX ADMIN — ROCURONIUM BROMIDE 50 MG: 10 SOLUTION INTRAVENOUS at 08:47

## 2023-01-24 RX ADMIN — ASPIRIN 81 MG: 81 TABLET, CHEWABLE ORAL at 07:58

## 2023-01-24 RX ADMIN — PROPOFOL 300 MG: 10 INJECTION, EMULSION INTRAVENOUS at 08:44

## 2023-01-24 RX ADMIN — LIDOCAINE HYDROCHLORIDE 100 MG: 20 INJECTION, SOLUTION EPIDURAL; INFILTRATION; INTRACAUDAL; PERINEURAL at 08:44

## 2023-01-24 RX ADMIN — SODIUM CHLORIDE, SODIUM LACTATE, POTASSIUM CHLORIDE, AND CALCIUM CHLORIDE: .6; .31; .03; .02 INJECTION, SOLUTION INTRAVENOUS at 08:27

## 2023-01-24 RX ADMIN — FENTANYL CITRATE 50 MCG: 50 INJECTION, SOLUTION INTRAMUSCULAR; INTRAVENOUS at 13:16

## 2023-01-24 RX ADMIN — PHENYLEPHRINE HYDROCHLORIDE 20 MCG/MIN: 10 INJECTION INTRAVENOUS at 09:04

## 2023-01-24 RX ADMIN — ROCURONIUM BROMIDE 10 MG: 10 SOLUTION INTRAVENOUS at 09:33

## 2023-01-24 ASSESSMENT — ENCOUNTER SYMPTOMS: SHORTNESS OF BREATH: 1

## 2023-01-24 ASSESSMENT — PAIN SCALES - GENERAL: PAINLEVEL_OUTOF10: 8

## 2023-01-24 ASSESSMENT — PAIN DESCRIPTION - LOCATION: LOCATION: BACK

## 2023-01-24 ASSESSMENT — PAIN DESCRIPTION - DESCRIPTORS: DESCRIPTORS: ACHING;NAGGING

## 2023-01-24 NOTE — CARE COORDINATION
Writer called by Liliya/Sanjuanita RN to assist with transfer to [facility].  Chart reviewed.  Writer confirmed plan with pt and wife at bedside, completed [facility] MICU transport form.  Per transfer center documentation, Rm [id number] N2N is [phone].  ZORAIDA Moreno-CAMILO

## 2023-01-24 NOTE — PROCEDURES
CARDIAC CATHETERIZATION REPORT     Procedure Date:  2023  Patient Name: Anil Nassar  MRN: [id number] : 1960        INDICATION     Abnl stress test    PROCEDURES PERFORMED     Left heart catheterization  LVgram  Coronary angiogam  Coronary cath  LIMA angiogram        PROCEDURE DESCRIPTION   Risks/benefits/alternatives/outcomes were discussed with patient and/or family and informed consent was obtained. Patient was prepped draped in the usual sterile fashion. Local anaesthetic was applied over puncture sites. Using fluoroscopic and ultrasound guidance with micropuncture kit and  Using seldinger technique, a long 5 F Namibian sheath was inserted into the right femoral artery and long 8 Thai sheath was inserted into the right common femoral vein. Diagnostic 5 Thai pigtail, JL 4, JL 4.5, 3 DRC, NICO catheters were used for diagnostic angiography. JL 4 was initially taken however left main could not be cannulated with this and was ultimately cannulated with a JL 4.5.  3 DRC was used for right-sided coronary angiography. NICO catheter was used for LIMA cannulation. .  Sedation was provided by the anesthesiology service, see their notes for full details. There were no immediate complications. 180cc contrast utilized. EBL <20cc. Following diagnostic angiography, there was failure of our x-ray tube, and as such it was felt that it was not safe to proceed with PCI. Additionally due to complex coronary disease, was felt that further conversation/discussion was needed with patient's family regarding therapeutic options including possible referral to tertiary center. FINDINGS         LVGRAM    LVEDP 14   GRADIENT ACROSS AORTIC VAVLE None   LV FUNCTION EF 35-40%   WALL MOTION Inferior hypokinesis   MITRAL REGURGITATION Mild           CORONARY ARTERIES    LM Less than 10% twnkhgqc-ncz-mblmba stenosis. Stent noted in LM.         LAD Calcified, there is 60 to 75% proximal-mid diffuse stenosis followed by mid 80% stenosis, there is bidirectional flow noted in the mid-distal vessel. LCX Medium sized vessel, there is ostial/proximal 20 to 30% stenosis, there is a stent in the proximal vessel, there is tapering beyond this into the mid circumflex with 50% stenosis. OM 1 is a small to medium sized vessel, it is stented throughout its hpwwtbkh-tab-dggzhx segments, there is 99% in-stent restenosis in the mid-distal segments. Collateralization from the RCA is noted to the distal vessel. RI Vessel is stented in the bazgysyx-woo-kqcrzd segments, there is 50% proximal-mid in-stent restenosis followed by 100% mid-distal . There are collaterals noted to the distal vessel from LIMA angiography. RCA Dominant, large vessel, stents are noted throughout the proximal-mid segments. These are patent with 20 to 30% xnqtoelk-znr-fwjnif stenosis. BYPASS GRAFTS    LIMA-LAD Widely patent with less than 10% jllnsdaz-evc-tbkdkc stenosis, collateralization is noted to the ramus as indicated above         SVG-D1  known to be occluded, not injected         SVG-OM  known to be occluded, not injected           CONCLUSIONS:     Severe circumflex and ramus restenosis  Consider additional revascularization of these territories although this is not likely to be a durable result  Consider brachytherapy and additional intervention at tertiary center  Patient has had prior care at [facility] and following conversation with patient's family, they would like to proceed with transfer to [facility], will admit to hospitalist service at [facility] and try to facilitate transfer to [facility] in the course of the next day or 2. Addendum:    Called [facility], he is accepted to their cardiac svc in [place] pending bed availibility.

## 2023-01-24 NOTE — ANESTHESIA POSTPROCEDURE EVALUATION
Department of Anesthesiology  Postprocedure Note    Patient: Renny Ortiz  MRN: [id number]  YOB: 1960  Date of evaluation: 1/24/2023      Procedure Summary     Date: 01/24/23 Room / Location: [facility] Cardiac Cath Lab    Anesthesia Start: 0827 Anesthesia Stop: 1027    Procedure: HEART CATH LEFT Diagnosis:       Atherosclerotic heart disease of native coronary artery without angina pectoris      CAD in native artery      Atherosclerotic heart disease of native coronary artery without angina pectoris    Scheduled Providers:  Responsible Provider: Bello Mills MD    Anesthesia Type: general ASA Status: 4          Anesthesia Type: No value filed.     Adele Phase I:      Adele Phase II:        Anesthesia Post Evaluation    Comments: Postoperative Anesthesia Note    Name:    Renny Ortiz  MRN:      [id number]    Patient Vitals in the past 12 hrs:  01/24/23 1045, BP:(!) 144/98, Pulse:(!) 106, Resp:28, SpO2:98 %  01/24/23 1041, BP:(!) 160/90, Pulse:(!) 106, Resp:25, SpO2:97 %  01/24/23 1040, BP:(!) 169/134, Pulse:(!) 106, Resp:21, SpO2:94 %  01/24/23 1035, BP:(!) 177/80, Pulse:(!) 103, Resp:25, SpO2:100 %  01/24/23 1030, BP:(!) 167/77, Pulse:(!) 104, Resp:26, SpO2:100 %  01/24/23 1025, BP:(!) 154/111, Temp:97 °F (36.1 °C), Temp src:Temporal, Pulse:(!) 101, Resp:20, SpO2:100 %  01/24/23 0646, Height:5' 4\" (1.626 m), Weight:(!) 300 lb 11.2 oz (136.4 kg)     LABS:    CBC  Lab Results       Component                Value               Date/Time                  WBC                      16.2 (H)            01/24/2023 07:06 AM        HGB                      17.0                01/24/2023 07:06 AM        HCT                      51.5                01/24/2023 07:06 AM        PLT                      233                 01/24/2023 07:06 AM   RENAL  Lab Results       Component                Value               Date/Time                  NA                       134 (L)             01/24/2023 07:06 AM        K 3.6                 01/24/2023 07:06 AM        CL                       88 (L)              01/24/2023 07:06 AM        CO2                      34 (H)              01/24/2023 07:06 AM        BUN                      20                  01/24/2023 07:06 AM        CREATININE               1.0                 01/24/2023 07:06 AM        GLUCOSE                  180 (H)             01/24/2023 07:06 AM   COAGS  Lab Results       Component                Value               Date/Time                  PROTIME                  13.8                01/24/2023 07:06 AM        INR                      1.06                01/24/2023 07:06 AM        APTT                     55.4 (H)            04/19/2021 05:53 AM     Intake & Output: In: 700 (I.V.:700)  Out: -     Nausea & Vomiting:  No    Level of Consciousness:  Awake    Pain Assessment:  Adequate analgesia    Anesthesia Complications:  No apparent anesthetic complications    SUMMARY      Vital signs stable  OK to discharge from Stage I post anesthesia care.   Care transferred from Anesthesiology department on discharge from perioperative area

## 2023-01-24 NOTE — ANESTHESIA PRE PROCEDURE
Department of Anesthesiology  Preprocedure Note       Name:  Emily Soto   Age:  63 y.o.  :  1960                                          MRN:  [id number]         Date:  2023      Surgeon: * Surgery not found *    Procedure:     Medications prior to admission:   Prior to Admission medications    Medication Sig Start Date End Date Taking?  Authorizing Provider   rOPINIRole (REQUIP) 1 MG tablet Take 1 tablet by mouth nightly 23   ANICETO Stone CNP   amitriptyline (ELAVIL) 10 MG tablet Take 1 tablet by mouth nightly 23   ANICETO Stone CNP   metOLazone (ZAROXOLYN) 5 MG tablet Take 1 tablet by mouth once a week 30 minutes before taking Lasix 23   Monserrat Ward MD   metoprolol succinate (TOPROL XL) 25 MG extended release tablet TAKE ONE TABLET BY MOUTH DAILY 22   ANICETO Dickson CNP   ondansetron (ZOFRAN) 4 MG tablet TAKE ONE TABLET BY MOUTH DAILY AS NEEDED FOR NAUSEA AND VOMITING 22   Cynthia Bosworth, APRN - CNP   ranolazine (RANEXA) 500 MG extended release tablet Take 1 tablet by mouth 2 times daily 12/24/22 3/24/23  Anderson Gregg MD   isosorbide mononitrate (IMDUR) 120 MG extended release tablet Take 1 tablet by mouth daily 22   Anderson Gregg MD   Cholecalciferol (VITAMIN D3) 1.25 MG (81812 UT) CAPS TAKE ONE CAPSULE BY MOUTH ONCE WEEKLY 22   ANICETO Stone CNP   fluticasone-umeclidin-vilant (TRELEGY ELLIPTA) 100-62.5-25 MCG/INH AEPB Inhale 1 puff into the lungs daily 10/13/22   Vicky Shelton MD   albuterol (PROVENTIL) (2.5 MG/3ML) 0.083% nebulizer solution Take 3 mLs by nebulization every 6 hours as needed for Wheezing or Shortness of Breath 10/13/22   Vicky Shelton MD   albuterol sulfate HFA (PROVENTIL HFA) 108 (90 Base) MCG/ACT inhaler Inhale 2 puffs into the lungs every 4 hours as needed for Wheezing or Shortness of Breath 10/13/22   Vicky Shelton MD   benzonatate (TESSALON) 200 MG capsule TAKE ONE CAPSULE BY MOUTH THREE TIMES A DAY AS NEEDED FOR COUGH 8/3/22   Salvatore Lazcano MD   lansoprazole (PREVACID) 30 MG delayed release capsule TAKE ONE CAPSULE BY MOUTH DAILY 8/3/22   Salvatore Lazcano MD   Omega-3 Fatty Acids (FISH OIL) 1000 MG CAPS Take 2 capsules by mouth 2 times daily 6/24/22   Ramón Montelongo MD   clopidogrel (PLAVIX) 75 MG tablet Take 1 tablet by mouth daily 5/31/22   Phil Soares,    furosemide (LASIX) 40 MG tablet Take 1 tablet by mouth as needed (for weight grater than 300lbs)  Patient taking differently: Take 80 mg by mouth 2 times daily 5/19/22 5/19/23  Phil Soares DO   atorvastatin (LIPITOR) 80 MG tablet Take 1 tablet by mouth nightly 4/13/22   Ramón Montelongo MD   Multiple Vitamins-Minerals (THERAPEUTIC MULTIVITAMIN-MINERALS) tablet Take 1 tablet by mouth daily    Historical Provider, MD   OXYGEN Inhale 2 L into the lungs as needed     Historical Provider, MD   nitroGLYCERIN (NITROSTAT) 0.4 MG SL tablet Place 1 tablet under the tongue every 5 minutes as needed for Chest pain 4/29/21   Ramón Montelongo MD   aspirin 81 MG EC tablet Take 1 tablet by mouth daily 9/30/20   Keyona Aguilar, APRN - CNP       Current medications:    Current Facility-Administered Medications   Medication Dose Route Frequency Provider Last Rate Last Admin   • famotidine (PEPCID) injection 20 mg  20 mg IntraVENous Once Doe Espinoza MD       • lactated ringers IV soln infusion   IntraVENous Continuous Doe Espinoza MD       • sodium chloride flush 0.9 % injection 5-40 mL  5-40 mL IntraVENous 2 times per day Doe Espinoza MD       • sodium chloride flush 0.9 % injection 5-40 mL  5-40 mL IntraVENous PRN Doe Espinoza MD       • 0.9 % sodium chloride infusion   IntraVENous PRN Doe Espinoza MD       • sodium chloride flush 0.9 % injection 5-40 mL  5-40 mL IntraVENous 2 times per day Ab Talavera MD       • sodium chloride flush 0.9 % injection 5-40 mL  5-40 mL  IntraVENous PRN Bijan Caban MD        0.9 % sodium chloride infusion   IntraVENous PRN Bijan Caban MD        aspirin tablet 325 mg  325 mg Oral Once Bijan Caban MD        ondansetron [facility]) injection 4 mg  4 mg IntraVENous Q6H PRN Bijan Caban MD        LORazepam (ATIVAN) tablet 0.5 mg  0.5 mg Oral Once PRN Bijan Caban MD           Allergies: Allergies   Allergen Reactions    Dilaudid [Hydromorphone Hcl] Nausea And Vomiting    Codeine Itching       Problem List:    Patient Active Problem List   Diagnosis Code    Crush injury of right foot S97.81XA    Shortness of breath R06.02    Other chest pain R07.89    Erectile dysfunction N52.9    Hyperglycemia R73.9    Anxiety F41.9    Depression F32. A    History of alcohol abuse F10.11    Fatigue R53.83    OANH (obstructive sleep apnea) G47.33    Hypersomnia G47.10    Chronic obstructive pulmonary disease (HCC) J44.9    Lumbar radiculopathy M54.16    HNP (herniated nucleus pulposus), lumbar M51.26    Spondylosis without myelopathy or radiculopathy, lumbar region M47.816    DDD (degenerative disc disease), lumbar M51.36    Lumbar spine pain M54.50    Acute respiratory distress R06.03    Class 3 severe obesity with body mass index (BMI) of 45.0 to 49.9 in adult ([facility]) E66.01, Z68.42    Pneumonia, primary atypical J18.9    Acute respiratory failure with hypoxia ([facility]) J96.01    Moderate protein-calorie malnutrition (HCC) E44.0    Acute respiratory failure (HCC) J96.00    Acute on chronic respiratory failure with hypoxemia ([facility]) J96.21    Acute diastolic congestive heart failure (HCC) I50.31    Hyperlipidemia E78.5    Insomnia G47.00    Morbid obesity with BMI of 45.0-49.9, adult ([facility]) E66.01, Z68.42    Abnormal cardiovascular stress test R94.39    Coronary artery disease involving native heart I25.10    S/P three vessel coronary artery bypass Z95.1    Acute kidney injury ([facility] [address] N17.9    Chronic combined systolic and diastolic congestive heart failure (HCC) I50.42    Mobitz type I Wenckebach atrioventricular block I44.1    Asystole ([facility]) I46.9    Ischemic cardiomyopathy I25.5    Diverticulitis of colon K57.32    Coronary artery disease involving native coronary artery of native heart with unstable angina pectoris ([facility]) I25.110    CHB (complete heart block) ([facility]) I44.2    Sinus bradycardia R00.1    Cellulitis L03.90    Acute deep vein thrombosis (DVT) of left upper extremity ([facility]) I82.622    Cellulitis of chest wall L03.313    Painful orthopaedic hardware ([facility]) T84.84XA    SSS (sick sinus syndrome) ([facility]) I49.5    Observed sleep apnea G47.30    ASHD (arteriosclerotic heart disease) I25.10    Pacemaker Z95.0    Chronic total occlusion of coronary artery I25.82    Ingrowing nail L60.0    Tinea unguium B35.1    Pain in left toe(s) M79.675    Other hammer toe(s) (acquired), left foot M20.42       Past Medical History:        Diagnosis Date    Acute diverticulitis 04/10/2021    Acute kidney injury ([address] 04/08/2021    Acute on chronic respiratory failure with hypoxemia (HCC)     Acute respiratory failure (HCC) 08/19/2020    Acute respiratory failure with hypoxia ([facility])     Anxiety 01/06/2020    Aortic valve calcification 09/2020    seen on lung CT    CAD in native artery 04/14/2021    Chronic obstructive pulmonary disease ([address] 09/03/2019    PFT done 9/03/19    Chronic systolic congestive heart failure ([address] 04/10/2021    Class 3 severe obesity with body mass index (BMI) of 45.0 to 49.9 in adult [facility])     Coronary artery calcification 09/2020    seen on lung ct    Coronary artery disease involving native heart with angina pectoris ([address] 09/22/2020    Crush injury of right foot 07/23/2015    DDD (degenerative disc disease), lumbar 01/06/2020    Depression 01/06/2020    Diverticulitis of colon 04/10/2021    Erectile dysfunction 01/06/2020    Fatigue 01/06/2020    HNP (herniated nucleus pulposus), lumbar 01/06/2020    Hyperglycemia 01/06/2020    Hyperlipidemia     Hypersomnia 01/06/2020    Hypertension     Insomnia     Ischemic cardiomyopathy     Kidney stone     Lumbar radiculopathy 01/06/2020    Lumbar spine pain 01/06/2020    LVH (left ventricular hypertrophy)     seen on echo 8/2020, moderate    Mobitz type I Wenckebach atrioventricular block 04/10/2021    Moderate protein-calorie malnutrition ([address] 03/17/2020    Observed sleep apnea 01/06/2020    OANH (obstructive sleep apnea) 01/06/2020    no C-pap is getting tested    Pneumonia, primary atypical     Reactive depression 01/06/2020    S/P three vessel coronary artery bypass 10/26/2020    Spondylosis without myelopathy or radiculopathy, lumbar region 01/06/2020    Tobacco abuse 01/06/2020    Tobacco use 01/06/2020    Wears dentures     full set       Past Surgical History:        Procedure Laterality Date    ANKLE SURGERY Left 08/02/2021    LEFT FOOT SCREW REMOVAL performed by Robbie Nicole MD at [address]  03/2021    CORONARY ARTERY BYPASS GRAFT N/A 09/25/2020    CORONARY ARTERY BYPASS GRAFTING X3, INTERNAL MAMMARY ARTERY, SAPHENOUS VEIN GRAFT, ON PUMP, STERNAL PLATING, 5 LEVEL BILATERAL INTERCOSTAL NERVE BLOCK, PLATELET GEL APPLICATION performed by Dave Bassett MD at [address] [facility]  03/16/2011    cystoscopy left ureteroscopy, left retrograde, double J stent placement    FINGER AMPUTATION Right 02/02/2021    RIGHT MIDDLE FINGER IRRIGATION AND DEBRIDEMENT WITH REVISION AMPUTATION AND BONE SHORTENING, POSSIBLE NAIL ABLATION performed by Francisco Javier Marvin MD at [facility]      Matrixectomy-NaOH/Phenol    PAIN MANAGEMENT PROCEDURE Right 12/24/2019    RIGHT LUMBAR FIVE SACRAL ONE TRANSFORAMINAL EPIDURAL STEROID INJECTION SITE CONFIRMED BY FLUOROSCOPY performed by Hernando Rose MD at [address] [facility] 2020    RIGHT LUMBAR FOUR AND LUMBAR FIVE TRANSFORAMINAL EPIDURAL STEROID INJECTION SITE CONFIRMED BY FLUOROSCOPY performed by Zackary Manzo MD at [place] [address]       Social History:    Social History     Tobacco Use    Smoking status: Former     Packs/day: 2.00     Years: 35.00     Pack years: 70.00     Types: Cigarettes     Quit date: 3/1/2020     Years since quittin.9    Smokeless tobacco: Never   Substance Use Topics    Alcohol use: No                                Counseling given: Not Answered      Vital Signs (Current):   Vitals:    23 0646   Weight: (!) 300 lb 11.2 oz (136.4 kg)   Height: 5' 4\" (1.626 m)                                              BP Readings from Last 3 Encounters:   23 118/64   23 108/70   22 110/62       NPO Status:                                                                                 BMI:   Wt Readings from Last 3 Encounters:   23 (!) 300 lb 11.2 oz (136.4 kg)   23 (!) 308 lb (139.7 kg)   23 (!) 308 lb (139.7 kg)     Body mass index is 51.62 kg/m².     CBC:   Lab Results   Component Value Date/Time    WBC 16.2 2023 07:06 AM    RBC 5.92 2023 07:06 AM    HGB 17.0 2023 07:06 AM    HCT 51.5 2023 07:06 AM    MCV 87.0 2023 07:06 AM    RDW 15.4 2023 07:06 AM     2023 07:06 AM       CMP:   Lab Results   Component Value Date/Time     2023 09:08 AM    K 4.2 2023 09:08 AM    K 4.6 2022 09:42 AM     2023 09:08 AM    CO2 30 2023 09:08 AM    BUN 18 2023 09:08 AM    CREATININE 0.9 2023 09:08 AM    GFRAA >60 2022 01:55 PM    GFRAA >60 2011 11:15 AM    AGRATIO 1.2 2022 09:42 AM    LABGLOM >60 2023 09:08 AM    GLUCOSE 185 2023 09:08 AM    PROT 6.9 2022 09:42 AM    PROT 6.9 2011 11:15 AM    CALCIUM 9.6 2023 09:08 AM    BILITOT 0.3 2022 09:42 AM    ALKPHOS 113 2022 09:42 AM AST 20 05/13/2022 09:42 AM    ALT 28 05/13/2022 09:42 AM       POC Tests: No results for input(s): POCGLU, POCNA, POCK, POCCL, POCBUN, POCHEMO, POCHCT in the last 72 hours. Coags:   Lab Results   Component Value Date/Time    PROTIME 12.7 10/19/2020 02:40 PM    INR 1.10 10/19/2020 02:40 PM    APTT 55.4 04/19/2021 05:53 AM       HCG (If Applicable): No results found for: PREGTESTUR, PREGSERUM, HCG, HCGQUANT     ABGs:   Lab Results   Component Value Date/Time    PHART 7.363 09/25/2020 06:27 PM    PO2ART 146.4 09/25/2020 06:27 PM    RHE5IQY 41.8 09/25/2020 06:27 PM    YHI3JPT 23.8 09/25/2020 06:27 PM    BEART -2 09/25/2020 06:27 PM    Z1JEFQHV 99 09/25/2020 06:27 PM        Type & Screen (If Applicable):  No results found for: LABABO, LABRH    Drug/Infectious Status (If Applicable):  No results found for: HIV, HEPCAB    COVID-19 Screening (If Applicable):   Lab Results   Component Value Date/Time    COVID19 Not Detected 12/22/2021 07:25 AM    COVID19 NOT DETECTED 09/05/2021 07:50 PM    COVID19 Not Detected 07/23/2021 03:04 PM           Anesthesia Evaluation  Patient summary reviewed and Nursing notes reviewed  Airway: Mallampati: I  TM distance: <3 FB   Neck ROM: full  Mouth opening: < 3 FB   Dental:    (+) edentulous      Pulmonary:normal exam  breath sounds clear to auscultation  (+) COPD:  shortness of breath:  sleep apnea:                             Cardiovascular:    (+) hypertension:, angina:, pacemaker:, CAD:, CABG/stent:, dysrhythmias:, CHF:, hyperlipidemia      ECG reviewed  Rhythm: regular  Rate: normal                 ROS comment: Chronic total occlusion     Neuro/Psych:   (+) neuromuscular disease:, psychiatric history:            GI/Hepatic/Renal:   (+) morbid obesity          Endo/Other: Negative Endo/Other ROS                    Abdominal:   (+) obese,           Vascular: negative vascular ROS.          Other Findings:           Anesthesia Plan      general     ASA 4       Induction: intravenous. MIPS: Postoperative opioids intended and Prophylactic antiemetics administered. Anesthetic plan and risks discussed with patient. Plan discussed with CRNA.                     Dann Bey MD   1/24/2023

## 2023-01-24 NOTE — H&P
Brief Pre-Op Note/Sedation Assessment      Lazaro Barron  1960  [id number]  7:58 AM    Planned Procedure: Cardiac Catheterization Procedure  Post Procedure Plan: Return to same level of care  Consent: I have discussed with the patient and/or the patient representative the indication, alternatives, and the possible risks and/or complications of the planned procedure and the anesthesia methods. The patient and/or patient representative appear to understand and agree to proceed. DISCUSSION OF CARDIAC CATHETERIZATION PROCEDURES: The procedures, indications, risks and alternatives have been discussed with the patient and, as appropriate, with the patient's guardian . Risks discussed included, but are not limited to, bleeding, development of blood clots/emboli, damage to blood vessels, renal failure, malignant cardiac arrhythmias, stroke, heart attack, emergent coronary bypass surgery, death, dye allergy. The patient (and guardian as appropriate) expressed understanding of the aforementioned and wished to proceed. Pt understands this is a higher risk procedure, will need gen anaesthesia to allow for pci given difficulty with lying on table for pt and due to complex cad, and having understood r/b/a/o, pt wishes to proceed today. Chief Complaint:   Chest Pain/Pressure      Indications for Cath Procedure:  Presentation:  Worsening Angina  2. Anginal Classification within 2 weeks:  CCS III - Symptoms with everyday living activities, i.e., moderate limitation  3. Angina Symptoms Assessment:  Typical Chest Pain  4. Heart Failure Class within last 2 weeks:  No symptoms  5. Cardiovascular Instability:  No    Prior Ischemic Workup/Eval:  Pre-Procedural Medications: Yes: Aspirin, Beta Blockers, and STATIN  2. Stress Test Completed?   Yes:  Stress or Imaging Studies Performed (within ANY time period):   Type:  Stress Nuclear  Results:  Positive:  Myocardial Perfusion Defects (Nuclear) Extent of Ischemia: Intermediate    Does Patient need surgery? Cath Valve Surgery:  No    Pre-Procedure Medical History:  Vital Signs:  Ht 5' 4\" (1.626 m)   Wt (!) 300 lb 11.2 oz (136.4 kg)   BMI 51.62 kg/m²     /63  HR 59    Allergies:     Allergies   Allergen Reactions    Dilaudid [Hydromorphone Hcl] Nausea And Vomiting    Codeine Itching     Medications:    Current Facility-Administered Medications   Medication Dose Route Frequency Provider Last Rate Last Admin    famotidine (PEPCID) injection 20 mg  20 mg IntraVENous Once Violet Pina MD        lactated ringers IV soln infusion   IntraVENous Continuous Violet MD Yovani        sodium chloride flush 0.9 % injection 5-40 mL  5-40 mL IntraVENous 2 times per day Voilet MD Yovani        sodium chloride flush 0.9 % injection 5-40 mL  5-40 mL IntraVENous PRN Violet MD Yovani        0.9 % sodium chloride infusion   IntraVENous PRN Violet MD Yovani        sodium chloride flush 0.9 % injection 5-40 mL  5-40 mL IntraVENous 2 times per day Kojo Frederick MD        sodium chloride flush 0.9 % injection 5-40 mL  5-40 mL IntraVENous PRN Kojo Frederick MD        0.9 % sodium chloride infusion   IntraVENous PRN Kojo Frederick MD        [facility] 4 mg  4 mg IntraVENous Q6H PRN Kojo Frederick MD        LORazepam (ATIVAN) tablet 0.5 mg  0.5 mg Oral Once PRN Kojo Frederick MD           Past Medical History:    Past Medical History:   Diagnosis Date    Acute diverticulitis 04/10/2021    Acute kidney injury ([address] 04/08/2021    Acute on chronic respiratory failure with hypoxemia (HCC)     Acute respiratory failure ([address] 08/19/2020    Acute respiratory failure with hypoxia ([address]     Anxiety 01/06/2020    Aortic valve calcification 09/2020    seen on lung CT    CAD in native artery 04/14/2021    Chronic obstructive pulmonary disease ([address] 09/03/2019    PFT done 9/03/19    Chronic systolic congestive heart failure ([address] 04/10/2021    Class 3 severe obesity with body mass index (BMI) of 45.0 to 49.9 in adult [facility])     Coronary artery calcification 09/2020    seen on lung ct    Coronary artery disease involving native heart with angina pectoris ([facility] [address] 09/22/2020    Crush injury of right foot 07/23/2015    DDD (degenerative disc disease), lumbar 01/06/2020    Depression 01/06/2020    Diverticulitis of colon 04/10/2021    Erectile dysfunction 01/06/2020    Fatigue 01/06/2020    HNP (herniated nucleus pulposus), lumbar 01/06/2020    Hyperglycemia 01/06/2020    Hyperlipidemia     Hypersomnia 01/06/2020    Hypertension     Insomnia     Ischemic cardiomyopathy     Kidney stone     Lumbar radiculopathy 01/06/2020    Lumbar spine pain 01/06/2020    LVH (left ventricular hypertrophy)     seen on echo 8/2020, moderate    Mobitz type I Wenckebach atrioventricular block 04/10/2021    Moderate protein-calorie malnutrition ([facility] [address] [address] 03/17/2020    Observed sleep apnea 01/06/2020    OANH (obstructive sleep apnea) 01/06/2020    no C-pap is getting tested    Pneumonia, primary atypical     Reactive depression 01/06/2020    S/P three vessel coronary artery bypass 10/26/2020    Spondylosis without myelopathy or radiculopathy, lumbar region 01/06/2020    Tobacco abuse 01/06/2020    Tobacco use 01/06/2020    Wears dentures     full set       Surgical History:    Past Surgical History:   Procedure Laterality Date    ANKLE SURGERY Left 08/02/2021    LEFT FOOT SCREW REMOVAL performed by Collins Claire MD at [address] [facility]  03/2021    CORONARY ARTERY BYPASS GRAFT N/A 09/25/2020    CORONARY ARTERY BYPASS GRAFTING X3, INTERNAL MAMMARY ARTERY, SAPHENOUS VEIN GRAFT, ON PUMP, STERNAL PLATING, 5 LEVEL BILATERAL INTERCOSTAL NERVE BLOCK, PLATELET GEL APPLICATION performed by Shruthi Martinez MD at [address] [facility]  03/16/2011    cystoscopy left ureteroscopy, left retrograde, double J stent placement FINGER AMPUTATION Right 02/02/2021    RIGHT MIDDLE FINGER IRRIGATION AND DEBRIDEMENT WITH REVISION AMPUTATION AND BONE SHORTENING, POSSIBLE NAIL ABLATION performed by Ulises Lala MD at [facility]      Matrixectomy-NaOH/Phenol    PAIN MANAGEMENT PROCEDURE Right 12/24/2019    RIGHT LUMBAR FIVE SACRAL ONE TRANSFORAMINAL EPIDURAL STEROID INJECTION SITE CONFIRMED BY FLUOROSCOPY performed by Jayson Mullins MD at [facility] Right 01/07/2020    RIGHT LUMBAR FOUR AND LUMBAR FIVE TRANSFORAMINAL EPIDURAL STEROID INJECTION SITE CONFIRMED BY FLUOROSCOPY performed by Jayson Mullins MD at [place] [address]             Pre-Sedation:  Pre-Sedation Documentation and Exam:  I have assessed the patient and reviewed the H&P on the chart. Prior History of Anesthesia Complications:   none    Modified Mallampati:  III (soft palate, base of uvula visible)    ASA Classification:  Class 4 - A patient with an incapacitating systemic disease that is a constant threat to life    Adele Scale: Activity:  2 - Able to move 4 extremities voluntarily on command  Respiration:  2 - Able to breathe deeply and cough freely  Circulation:  2 - BP+/- 20mmHg of normal  Consciousness:  2 - Fully awake  Oxygen Saturation (color):  2 - Able to maintain oxygen saturation >92% on room air    Sedation/Anesthesia Plan:  Guard the patient's safety and welfare. Minimize physical discomfort and pain. Minimize negative psychological responses to treatment by providing sedation and analgesia and maximize the potential amnesia. Patient to meet pre-procedure discharge plan. Medication Planned:  As per anesthesiology service.       Patient is an appropriate candidate for plan of sedation:   yes      Electronically signed by Bijan Caban MD on 1/24/2023 at 7:58 AM

## 2023-01-25 LAB
EKG ATRIAL RATE: 112 BPM
EKG DIAGNOSIS: NORMAL
EKG P AXIS: 34 DEGREES
EKG P-R INTERVAL: 178 MS
EKG Q-T INTERVAL: 328 MS
EKG QRS DURATION: 90 MS
EKG QTC CALCULATION (BAZETT): 447 MS
EKG R AXIS: 259 DEGREES
EKG T AXIS: 57 DEGREES
EKG VENTRICULAR RATE: 112 BPM

## 2023-01-25 PROCEDURE — 93010 ELECTROCARDIOGRAM REPORT: CPT | Performed by: INTERNAL MEDICINE

## 2023-01-25 NOTE — DISCHARGE SUMMARY
[address]  Discharge Summary  Patient ID:  Lucius Joya  [id number] y.o. 1960    Admit date: 1/24/2023    Discharge date: 1/24/2023     Admitting Provider: Obed Carver MD     Discharge Provider: Obed Carver MD     Admission Diagnoses: Atherosclerotic heart disease of native coronary artery without angina pectoris [I25.10]  CAD in native artery [I25.10]    Discharge Diagnoses:   Patient Active Problem List   Diagnosis    Crush injury of right foot    Shortness of breath    Other chest pain    Erectile dysfunction    Hyperglycemia    Anxiety    Depression    History of alcohol abuse    Fatigue    OANH (obstructive sleep apnea)    Hypersomnia    Chronic obstructive pulmonary disease (HCC)    Lumbar radiculopathy    HNP (herniated nucleus pulposus), lumbar    Spondylosis without myelopathy or radiculopathy, lumbar region    DDD (degenerative disc disease), lumbar    Lumbar spine pain    Acute respiratory distress    Class 3 severe obesity with body mass index (BMI) of 45.0 to 49.9 in adult [facility])    Pneumonia, primary atypical    Acute respiratory failure with hypoxia (HCC)    Moderate protein-calorie malnutrition ([address]    Acute respiratory failure ([address]    Acute on chronic respiratory failure with hypoxemia (HCC)    Acute diastolic congestive heart failure (HCC)    Hyperlipidemia    Insomnia    Morbid obesity with BMI of 45.0-49.9, adult ([address]    Abnormal cardiovascular stress test    Coronary artery disease involving native heart    S/P three vessel coronary artery bypass    Acute kidney injury ([address]    Chronic combined systolic and diastolic congestive heart failure (HCC)    Mobitz type I Wenckebach atrioventricular block    Asystole ([address]    Ischemic cardiomyopathy    Diverticulitis of colon    Coronary artery disease involving native coronary artery of native heart with unstable angina pectoris ([address]    CHB (complete heart block) (HCC)    Sinus bradycardia    Cellulitis    Acute deep vein thrombosis (DVT) of left upper extremity (HCC)    Cellulitis of chest wall    Painful orthopaedic hardware (HCC)    SSS (sick sinus syndrome) (HCC)    Observed sleep apnea    ASHD (arteriosclerotic heart disease)    Pacemaker    Chronic total occlusion of coronary artery    Ingrowing nail    Tinea unguium    Pain in left toe(s)    Other hammer toe(s) (acquired), left foot    CAD in native artery        Discharged Condition: fair  The patient was seen and examined on day of discharge and this discharge summary is in conjunction with any daily progress note from day of discharge. Hospital Course: Mckenzie Chandra was admitted for elective outpt ht cath, found to have mv cad/ashd, and following d/w family, was tx to [place] [address] for eval for brachytherapy and as per pt/family preference for additional eval there. Physical Exam:  BP (!) 144/98   Pulse (!) 106   Temp 97 °F (36.1 °C) (Temporal)   Resp 28   Ht 5' 4\" (1.626 m)   Wt (!) 300 lb 11.2 oz (136.4 kg)   SpO2 98%   BMI 51.62 kg/m²   No intake or output data in the 24 hours ending 01/25/23 1635  General:  Awake, alert, NAD  Skin:  Warm and dry  Neck:  JVD<8, no bruit  Chest:  Clear to auscultation, no wheezes/rhonchi/rales  Cardiovascular:  RRR S1S2  Abdomen:  Soft, nontender, +bowel sounds  Groin: art/grupo lines in place.     Significant Diagnostic Studies:   See cath report  Labs:   Lab Results   Component Value Date    CREATININE 1.0 01/24/2023    BUN 20 01/24/2023     (L) 01/24/2023    K 3.6 01/24/2023    CL 88 (L) 01/24/2023    CO2 34 (H) 01/24/2023      Lab Results   Component Value Date    WBC 16.2 (H) 01/24/2023    HGB 17.0 01/24/2023    HCT 51.5 01/24/2023    MCV 87.0 01/24/2023     01/24/2023      Lab Results   Component Value Date    INR 1.06 01/24/2023    PROTIME 13.8 01/24/2023    No results found for: BNP          Disposition: transfer to [facility]     Patient Instructions:      Medication List        ASK your doctor about these medications      * albuterol (2.5 MG/3ML) 0.083% nebulizer solution  Commonly known as: PROVENTIL  Take 3 mLs by nebulization every 6 hours as needed for Wheezing or Shortness of Breath     * albuterol sulfate  (90 Base) MCG/ACT inhaler  Commonly known as: Proventil HFA  Inhale 2 puffs into the lungs every 4 hours as needed for Wheezing or Shortness of Breath     amitriptyline 10 MG tablet  Commonly known as: ELAVIL  Take 1 tablet by mouth nightly     aspirin 81 MG EC tablet  Take 1 tablet by mouth daily     atorvastatin 80 MG tablet  Commonly known as: LIPITOR  Take 1 tablet by mouth nightly     benzonatate 200 MG capsule  Commonly known as: TESSALON  TAKE ONE CAPSULE BY MOUTH THREE TIMES A DAY AS NEEDED FOR COUGH     clopidogrel 75 MG tablet  Commonly known as: PLAVIX  Take 1 tablet by mouth daily     fish oil 1000 MG capsule  Take 2 capsules by mouth 2 times daily     furosemide 40 MG tablet  Commonly known as: LASIX  Take 1 tablet by mouth as needed (for weight grater than 300lbs)     isosorbide mononitrate 120 MG extended release tablet  Commonly known as: IMDUR  Take 1 tablet by mouth daily     lansoprazole 30 MG delayed release capsule  Commonly known as: PREVACID  TAKE ONE CAPSULE BY MOUTH DAILY     metOLazone 5 MG tablet  Commonly known as: ZAROXOLYN  Take 1 tablet by mouth once a week 30 minutes before taking Lasix     metoprolol succinate 25 MG extended release tablet  Commonly known as: TOPROL XL  TAKE ONE TABLET BY MOUTH DAILY     nitroGLYCERIN 0.4 MG SL tablet  Commonly known as: Nitrostat  Place 1 tablet under the tongue every 5 minutes as needed for Chest pain     ondansetron 4 MG tablet  Commonly known as: ZOFRAN  TAKE ONE TABLET BY MOUTH DAILY AS NEEDED FOR NAUSEA AND VOMITING     OXYGEN     ranolazine 500 MG extended release tablet  Commonly known as: RANEXA  Take 1 tablet by mouth 2 times daily     rOPINIRole 1 MG tablet  Commonly known as: Requip  Take 1 tablet by mouth nightly     therapeutic multivitamin-minerals tablet     Trelegy Ellipta 100-62.5-25 MCG/ACT Aepb inhaler  Generic drug: fluticasone-umeclidin-vilant  Inhale 1 puff into the lungs daily     Vitamin D3 1.25 MG (41176 UT) Caps  TAKE ONE CAPSULE BY MOUTH ONCE WEEKLY           * This list has 2 medication(s) that are the same as other medications prescribed for you. Read the directions carefully, and ask your doctor or other care provider to review them with you. Activity: bedrest  Diet: cardiac diet  Wound Care: as directed    Follow-up at [place] [address].     Signed:  Delfino Castillo MD, 1/25/2023, 4:35 PM

## 2023-01-25 NOTE — PROGRESS NOTES
Carelink Express transmission @ [facility]. Transmission shows normal sensing and pacing function. EP physician will review. See interrogation under the cardiology tab in the [address] [facility] [facility] [place] [address] field for more details. Will continue to monitor remotely. ---------------------- Last [employer] CareLink Monitor Session 09-Jan-2023 ----------------------------------------------------------------------------  DC PPM.  No  arrhythmias recorded.   Pacing (% of Time Since 09-Jan-2023)   2.2% (MVP On)  AP 0.4%

## 2023-01-28 DIAGNOSIS — G25.81 RESTLESS LEG: ICD-10-CM

## 2023-01-30 DIAGNOSIS — R60.9 EDEMA, UNSPECIFIED TYPE: ICD-10-CM

## 2023-01-30 DIAGNOSIS — I50.42 CHRONIC COMBINED SYSTOLIC AND DIASTOLIC CONGESTIVE HEART FAILURE (HCC): ICD-10-CM

## 2023-01-30 DIAGNOSIS — E55.9 VITAMIN D DEFICIENCY: ICD-10-CM

## 2023-01-30 RX ORDER — ROPINIROLE 0.5 MG/1
TABLET, FILM COATED ORAL
Qty: 30 TABLET | Refills: 1 | OUTPATIENT
Start: 2023-01-30

## 2023-01-30 NOTE — TELEPHONE ENCOUNTER
Anita Wilson is requesting refill(s)   Last OV 1/12/23 (pertaining to medication)  LR 1/12/23  (per medication requested)  Next office visit scheduled or attempted Yes   If no, reason:

## 2023-01-31 RX ORDER — ONDANSETRON 4 MG/1
TABLET, FILM COATED ORAL
Qty: 30 TABLET | Refills: 0 | Status: SHIPPED | OUTPATIENT
Start: 2023-01-31 | End: 2023-03-03

## 2023-01-31 RX ORDER — CHOLECALCIFEROL (VITAMIN D3) 1250 MCG
CAPSULE ORAL
Qty: 4 CAPSULE | Refills: 0 | Status: SHIPPED | OUTPATIENT
Start: 2023-01-31

## 2023-01-31 RX ORDER — BENZONATATE 200 MG/1
CAPSULE ORAL
Qty: 30 CAPSULE | Refills: 0 | Status: SHIPPED | OUTPATIENT
Start: 2023-01-31 | End: 2023-03-03

## 2023-01-31 NOTE — TELEPHONE ENCOUNTER
Jayde Marcum is requesting refill(s)   Last OV 1- (pertaining to medication)  LR 12- (per medication requested)  Next office visit scheduled or attempted Yes   If no, reason:  2-

## 2023-02-02 RX ORDER — METOLAZONE 5 MG/1
5 TABLET ORAL WEEKLY
Qty: 30 TABLET | Refills: 1 | Status: SHIPPED | OUTPATIENT
Start: 2023-02-02

## 2023-02-03 NOTE — TELEPHONE ENCOUNTER
----- Message from Rachel Gamez MA sent at 7/29/2021  8:42 AM EDT -----  Regarding: Not covered  Sleep studies are not covered under this members health plan. LOV: 7/19/21    Assessment:       · Hypersomnia and observed sleep apnea  · Probable COPD  · Dyspnea on exertion-multifactorial due to obesity, deconditioning, probable underlying COPD and cardiac  · Bradycardia, AV block, ischemic cardiomyopathy post CABG September 2020 followed by cardiology  · 45 pack year smoking- Quit March 2020          Plan:       · PFTs and 6MW   · Continue Albuterol 2 puffs Q4-6 hrs PRN  · Trial of Trelegy Ellipta Oral inhalation: Dry powder inhaler: One inhalation (fluticasone furoate 100 mcg/umeclidinium 62.5 mcg/vilanterol 25 mcg) once daily (maximum dose: 1 inhalation/day)  · Advised to get his Pneumococcal vaccine and influenza vaccine this year   · Declined Covid vaccine. Risks, benefits and side effects were discussed with patient. · Advised to continue with smoking cessation  · HST evaluate for sleep related breathing disorder- declined in lab. Complications of OANH if not treated were discussed with patient patient, including systemic hypertension, pulmonary hypertension, cardiovascular morbidities, car accidents and all cause mortality. Writer spoke w/pt at her request. She is emphasizing that she wants to modify her AOT so that she only f/u w/psychiatric med management. No longer wants to see a therapist. Says she had a bad experience w/S therapist. She says she has spoken to her  since on the unit.

## 2023-02-08 ENCOUNTER — HOSPITAL ENCOUNTER (OUTPATIENT)
Dept: CARDIAC REHAB | Age: 63
Setting detail: THERAPIES SERIES
Discharge: HOME | End: 2023-02-08

## 2023-02-08 VITALS — WEIGHT: 298 LBS | OXYGEN SATURATION: 92 % | RESPIRATION RATE: 14 BRPM | BODY MASS INDEX: 50.88 KG/M2 | HEIGHT: 64 IN

## 2023-02-08 RX ORDER — HYDROCODONE BITARTRATE AND ACETAMINOPHEN 10; 325 MG/1; MG/1
1 TABLET ORAL EVERY 6 HOURS PRN
COMMUNITY

## 2023-02-08 RX ORDER — HYDROXYZINE PAMOATE 25 MG/1
25 CAPSULE ORAL 3 TIMES DAILY PRN
COMMUNITY

## 2023-02-08 RX ORDER — TORSEMIDE 20 MG/1
40 TABLET ORAL DAILY
COMMUNITY

## 2023-02-08 RX ORDER — PREGABALIN 150 MG/1
150 CAPSULE ORAL NIGHTLY
COMMUNITY
Start: 2023-01-10

## 2023-02-08 RX ORDER — DULOXETIN HYDROCHLORIDE 60 MG/1
60 CAPSULE, DELAYED RELEASE ORAL DAILY
COMMUNITY

## 2023-02-08 RX ORDER — HYDROCODONE BITARTRATE AND ACETAMINOPHEN 5; 325 MG/1; MG/1
1 TABLET ORAL EVERY 6 HOURS PRN
COMMUNITY

## 2023-02-08 RX ORDER — TRAZODONE HYDROCHLORIDE 100 MG/1
100 TABLET ORAL NIGHTLY
COMMUNITY

## 2023-02-08 ASSESSMENT — PATIENT HEALTH QUESTIONNAIRE - PHQ9
5. POOR APPETITE OR OVEREATING: 0
9. THOUGHTS THAT YOU WOULD BE BETTER OFF DEAD, OR OF HURTING YOURSELF: 0
SUM OF ALL RESPONSES TO PHQ9 QUESTIONS 1 & 2: 3
2. FEELING DOWN, DEPRESSED OR HOPELESS: 0
6. FEELING BAD ABOUT YOURSELF - OR THAT YOU ARE A FAILURE OR HAVE LET YOURSELF OR YOUR FAMILY DOWN: 0
SUM OF ALL RESPONSES TO PHQ QUESTIONS 1-9: 12
4. FEELING TIRED OR HAVING LITTLE ENERGY: 3
10. IF YOU CHECKED OFF ANY PROBLEMS, HOW DIFFICULT HAVE THESE PROBLEMS MADE IT FOR YOU TO DO YOUR WORK, TAKE CARE OF THINGS AT HOME, OR GET ALONG WITH OTHER PEOPLE: 1
SUM OF ALL RESPONSES TO PHQ QUESTIONS 1-9: 12
3. TROUBLE FALLING OR STAYING ASLEEP: 3
SUM OF ALL RESPONSES TO PHQ QUESTIONS 1-9: 12
8. MOVING OR SPEAKING SO SLOWLY THAT OTHER PEOPLE COULD HAVE NOTICED. OR THE OPPOSITE, BEING SO FIGETY OR RESTLESS THAT YOU HAVE BEEN MOVING AROUND A LOT MORE THAN USUAL: 0
SUM OF ALL RESPONSES TO PHQ QUESTIONS 1-9: 12
7. TROUBLE CONCENTRATING ON THINGS, SUCH AS READING THE NEWSPAPER OR WATCHING TELEVISION: 3
1. LITTLE INTEREST OR PLEASURE IN DOING THINGS: 3

## 2023-02-08 ASSESSMENT — EJECTION FRACTION: EF_VALUE: 46

## 2023-02-08 NOTE — PLAN OF CARE
Individual Treatment Plan  Cardiac Rehabilitation    Initial Assessment  Name: Mario Enriquez Admit to Rehab: 2023   : 1960 Primary Diagnosis: PCI    Age: 63 y.o. Referring Physician:  Derek Rebolledo MD   MRN: [id number] Primary Care Physician: ANICETO Garcia - CNP     Allergies   Allergen Reactions    Dilaudid [Hydromorphone Hcl] Nausea And Vomiting    Codeine Itching   Pt did not complete 6 minute walk test on initial assessment r/t pt has a cardiology appointment 23. Pt was not cleared for exercise. Will complete walk test on 1st visit. Exercise Assessment  Stages of Change: Preparation   Risk Stratification: High    Fall Risk: Secondary diagnoses (15 pts)  Ambulatory aid device (15 pts)  Gait weak (10 pts)  Score: 40  Assistive Devices:Cane  Initial Assessment: 6 Minute Walk Test   Pre-Test Vitals:  ;     Peak Vitals:      During:     Number of Rest: 0  Post-Test Vitals:     Exercise Prescription        Mode: . Treadmill, Bike (Upright or recumbent), NuStep, SciFit, Airdyne, Arm Ergometer, Recumbent Eliptical, and Walking      Frequency: 2-3 days/week supervised      Intensity:RPE 11-14      Target Heart Rate: Resting HR + 20-30      Duration: 25-32+ minutes/day      Resistance Training: Yes, 3 days per week      Progression:Increase exercise by 5 minutes every week to goal of 30 to 50 minutes and Increase repetitions from 10 to 15 and/or sets from 1 to 3      Supplemental oxygen:Yes, 4Liters per minute oxygen via Nasal cannula    Plan  Home Exercise: Yes  Mode:Treadmill  Resistance Training:Yes  Target Goals  Adhere to cardiac exercise guidelines , Increase exercise endurance and stamina , Increase functional capacity as compared to initial assessment , Increase functional capacity as measured by a 6 minute walk or shuttle walk , Maintain SpO2> 90 during exercise , Participate in strength training , and Perform purse-lip breathing during exercise   Education  Mr. Cesar Franco was educated on the importance of   exercise safety, RPE scale, Kelly Scale of Perceived Exertion use, Importance of physical activity and exercise progression, and equipment orientation  Nutrition Assessment  Stages of Change: Preparation  Height: 5' 4\" (162.6 cm) Weight: 298 lb (135.2 kg) BMI (Calculated): 51.3     Nutrition Survey: Rate Your Plate Score: Rate Your Plate Total Score: 49     Patient Weight Goal: 268 lb   Recommended DietIncrease consumption of fruit and vegetable to 8-10 servings a day, increase whole grains and low sodium and lowfat proteins, Maintain adequate nutrition; eating a variety of fruits and vegetables, low fat and cholesterol foods, Reduce saturated fat and cholesterol, Follow low sodium diet, Decrease caloric intake, Decrease intake of processed foods, and Decrease intake of refined sugars  Diabetic:NoIncrease consumption of fruit and vegetable to 8-10 servings a day, increase whole grains and low sodium and lowfat proteins, Reduce saturated fat and cholesterol, Follow low sodium diet, Limit dietary intake of saturated fat, cholesterol and sodium, Decrease caloric intake, Decrease intake of processed foods, and Decrease intake of refined sugars  Key Antihyperglycemic Medications       Patient is on no antihyperglycemic meds. Key Hyperlipidemia Meds            atorvastatin (LIPITOR) 80 MG tablet    Sig - Route: Take 1 tablet by mouth nightly - Oral               Lab Results   Component Value Date    GLUCOSE 180 (H) 01/24/2023    LABA1C 6.3 11/11/2021    CHOL 92 01/24/2023    HDL 35 (L) 01/24/2023    LDLCALC 29 01/24/2023    TRIG 140 01/24/2023       Blood Pressure  Key Anti-Hypertensive Meds            torsemide (DEMADEX) 20 MG tablet (Taking)    Class: Historical Med    metOLazone (ZAROXOLYN) 5 MG tablet    Sig - Route:  Take 1 tablet by mouth once a week 30 minutes before taking Lasix - Oral    metoprolol succinate (TOPROL XL) 25 MG extended release tablet    Sig: TAKE ONE TABLET BY MOUTH DAILY    furosemide (LASIX) 40 MG tablet    Sig - Route: Take 1 tablet by mouth as needed (for weight grater than 300lbs) - Oral    Patient taking differently: Take 80 mg by mouth 2 times daily          Nutrition Intervention  Attend education classes related to nutrition, Complete diet survey, and Participate in an exercise program that promotes weight loss  Target Goals  Articulate heart healthy diet , Complete cardiac diet classes , Control blood pressure , and Reduce weight   Blood Pressure <140/90 or  <130/80 if diabetic or chronic kidney disease. Education  Mr. Samantha York was educated on the importance ofdaily weight monitoring and maintaining optimal weight/BMI  Tobacco Assessment  Social History     Tobacco Use   Smoking Status Former    Packs/day: 2.00    Years: 35.00    Pack years: 70.00    Types: Cigarettes    Quit date: 3/1/2020    Years since quittin.9   Smokeless Tobacco Never     Psychosocial Assessment  Stages of Change:Preparation  Social History     Socioeconomic History    Marital status:      Spouse name: Not on file    Number of children: Not on file    Years of education: Not on file    Highest education level: Not on file   Occupational History    Not on file   Tobacco Use    Smoking status: Former     Packs/day: 2.00     Years: 35.00     Pack years: 70.00     Types: Cigarettes     Quit date: 3/1/2020     Years since quittin.9    Smokeless tobacco: Never   Vaping Use    Vaping Use: Never used   Substance and Sexual Activity    Alcohol use: No    Drug use: Not Currently     Types: Marijuana [place]), Cocaine     Comment: hx of drug use 30 yrs ago    Sexual activity: Not Currently   Other Topics Concern    Not on file   Social History Narrative    Not on file     Social Determinants of Health     Financial Resource Strain: Low Risk     Difficulty of Paying Living Expenses: Not hard at all   Food Insecurity: No Food Insecurity    Worried About [address] in the Last Year: Never true    Ran Out of Food in the Last Year: Never true   Transportation Needs: Not on file   Physical Activity: Insufficiently Active    Days of Exercise per Week: 3 days    Minutes of Exercise per Session: 30 min   Stress: Not on file   Social Connections: Not on file   Intimate Partner Violence: Not At Risk    Fear of Current or Ex-Partner: No    Emotionally Abused: No    Physically Abused: No    Sexually Abused: No   Housing Stability: Not on file      Psychosocial Test  Today's PHQ:    [facility] Scores 2/8/2023 1/12/2023 11/23/2022 6/29/2022 11/11/2021 3/1/2021 6/26/2020   PHQ2 Score 3 5 2 3 6 6 4   PHQ9 Score 12 13 8 17 18 15 13     Interpretation of Total Score Depression Severity: 1-4 = Minimal depression, 5-9 = Mild depression, 10-14 = Moderate depression, 15-19 = Moderately severe depression, 20-27 = Severe depression    Psychosocial Assessment  Does the Patient Report Any of the Following: Anxiety; Depression  Positive Aspects of Family and Home Situation That May Affect Treatment : Living spouse or significant other; [place] caring support system;  Socially included  Negative Aspects of Family and Home Situation That May Affect Treatment: Patient denies any issues at this time  Currently Taking Psychotropic Meds: Yes  Medication Changes: Yes (change to duiretics, pain meds, possible diabetic)  Are You Aware of Any Stress and/or Tension in Your Life: Yes  Level of Stress and/or Tension: Moderate  What Areas Cause You the Most Stress and/or Tension: health  How Do You Deal/[place] With Stress and/or Tension: Sit byself  What Are Your Calming Techniques: Sit byself  Have You Been Abused or a Victim of a Crime: No  Evidence of Abuse or Neglect: Patient denies any issues at this time        Psychosocial Intervention  Participate in an exercise program that improves psychosocial symptoms  Target Goals  Return to ADLs/desired activities , Identify a positive support system , Maximize stress management/coping skills , Articulate confidence in adequate treatment of symptoms , and Improve quality of live, self-efficacy and depression screening scores as measured by the appropriate tool   Learning Assessment  Stages of Change: Preparation  Barriers to Learning:  Pt is unable to read. Personal Learning Style:Verbal and Video    Education Intervention/Learning Needs Identified  Exercise, Fall prevention, Heart Failure, Infection prevention, Medications, Nutrition, Risk factor for CAD, Signs and symptoms of MI/CVA, Stregnth training, Stress management and relaxation, and Weight management  Target Goals  Adherence to medication regimen , Articulate understanding of self-management and prevention/treatment of cardiac disease , Attend appropriate group education classes , Attend appropriate web education classes , and Restore to highest level of independent functionality   Education  Mr. Vidya Bloom was educated on the importance of relaxation techniques                     Provider Review and Approval of this ITP    The above treatment plan and goals have been set for your patient during Cardiac Rehabilitation.  Please review and Electronically Cosign        Electronically signed by Pedro Marquez RN on 2/8/23 at 1:53 PM EST

## 2023-02-10 NOTE — PROGRESS NOTES
Spoke with patient on telephone. States he had a cardiology appointment 2/9/23, states he is able to start CR on 2/15 at 0915.

## 2023-02-15 ENCOUNTER — HOSPITAL ENCOUNTER (OUTPATIENT)
Dept: CARDIAC REHAB | Age: 63
Setting detail: THERAPIES SERIES
Discharge: HOME | End: 2023-02-15

## 2023-02-16 ENCOUNTER — OFFICE VISIT (OUTPATIENT)
Dept: FAMILY MEDICINE CLINIC | Age: 63
End: 2023-02-16

## 2023-02-16 VITALS
SYSTOLIC BLOOD PRESSURE: 124 MMHG | WEIGHT: 301 LBS | HEART RATE: 86 BPM | OXYGEN SATURATION: 96 % | DIASTOLIC BLOOD PRESSURE: 70 MMHG | BODY MASS INDEX: 51.39 KG/M2 | HEIGHT: 64 IN

## 2023-02-16 DIAGNOSIS — E11.9 NEW ONSET TYPE 2 DIABETES MELLITUS (HCC): ICD-10-CM

## 2023-02-16 DIAGNOSIS — Z09 HOSPITAL DISCHARGE FOLLOW-UP: ICD-10-CM

## 2023-02-16 DIAGNOSIS — Z98.61 POST PTCA: ICD-10-CM

## 2023-02-16 DIAGNOSIS — I25.82 CHRONIC TOTAL OCCLUSION OF CORONARY ARTERY: Primary | ICD-10-CM

## 2023-02-16 LAB
CHP ED QC CHECK: ABNORMAL
GLUCOSE BLD-MCNC: 212 MG/DL

## 2023-02-16 RX ORDER — SPIRONOLACTONE 25 MG/1
25 TABLET ORAL DAILY
COMMUNITY
Start: 2023-02-09

## 2023-02-16 SDOH — ECONOMIC STABILITY: FOOD INSECURITY: WITHIN THE PAST 12 MONTHS, THE FOOD YOU BOUGHT JUST DIDN'T LAST AND YOU DIDN'T HAVE MONEY TO GET MORE.: NEVER TRUE

## 2023-02-16 SDOH — ECONOMIC STABILITY: FOOD INSECURITY: WITHIN THE PAST 12 MONTHS, YOU WORRIED THAT YOUR FOOD WOULD RUN OUT BEFORE YOU GOT MONEY TO BUY MORE.: NEVER TRUE

## 2023-02-16 SDOH — ECONOMIC STABILITY: HOUSING INSECURITY
IN THE LAST 12 MONTHS, WAS THERE A TIME WHEN YOU DID NOT HAVE A STEADY PLACE TO SLEEP OR SLEPT IN A SHELTER (INCLUDING NOW)?: NO

## 2023-02-16 SDOH — ECONOMIC STABILITY: INCOME INSECURITY: HOW HARD IS IT FOR YOU TO PAY FOR THE VERY BASICS LIKE FOOD, HOUSING, MEDICAL CARE, AND HEATING?: NOT HARD AT ALL

## 2023-02-16 NOTE — PROGRESS NOTES
Post-Discharge Transitional Care Management Progress Note      Juliette Tong   YOB: 1960    Date of Office Visit:  2/16/2023  Date of Hospital Admission: 1/24/2023  Date of Hospital Discharge: 2/1/2023    Care management risk score Rising risk (score 2-5) and Complex Care (Scores >=6): No Risk Score On File     Non face to face  following discharge, date last encounter closed (first attempt may have been earlier): *No documented post hospital discharge outreach found in the last 14 days *No documented post hospital discharge outreach found in the last 14 days    Call initiated 2 business days of discharge: *No response recorded in the last 14 days    ASSESSMENT/PLAN:   Chronic total occlusion of coronary artery  Post PTCA  And has already had follow-up with cardiology and his Lasix was changed to torsemide which has helped his edema. Continue with fluid restriction and follow-up with cardiology as recommended  New onset type 2 diabetes mellitus ([address]  Start  -     metFORMIN (GLUCOPHAGE) 500 MG tablet; Take 1 tablet by mouth 2 times daily (with meals), Disp-60 tablet, R-1Normal  -     POCT Glucose  Check BMP in 1 month and follow-up with me in the office with blood glucose readings    Diabetes education provided today:  Metabolic syndrome: association of diabetes with dyslipidemia, HTN and obesity. Diabetic Neuropathy: signs and therapy. Foot care: advised to wash feet daily, pat dry and apply lotion at night, avoiding between toes. Need to look at feet daily and report to a physician any signs of inflammation or skin damage. Discussed diabetes shoes and socks. Diabetic retinopathy: the most frequent cause of blindness in [place] currently. Measures to prevent that. Diabetic nephropathy: Kidney function, microalbumin as a sign of diabetic nephropathy. Stages of kidney disease. Nutrition as a mainstream of diabetes therapy.   Carbs: good carbs and bad carbs, importance of carb counting, incorporation of protein with each meal to reduce Glycemic index, importance of portions,   Fats: Good fats and bad fats, meal planning and supplements. Physical activity: advised to exercise 5-7 days a week 30-60 mins at least. Discussed how it affects BS readings. Hospital discharge follow-up  Extensively reviewed and discussed recent in patient hospital records, labs, imaging and consults with patient, discussed HPI and Plan, coordinated care and patient counseling provided at today's visit      Medical Decision Making: high complexity  Return in 1 month (on 3/16/2023). Subjective:   HPI:  Follow up of Hospital problems/diagnosis(es):   1. Chronic total occlusion of coronary artery    2. Post PTCA        Inpatient course: Discharge summary reviewed- see chart. Hospital Course:   He underwent LHC with successful PCI of ramus  and laser atherectomy of ISR OM1 as below. He tolerated the procedure well and was without chest pain prior to discharge. He had an elevated LVEDP and we will increase his Lasix to 40 mg BID.    092 123 at the cardio follow-up his Lasix was discontinued and he was started on torsemide 40 mg daily. Patient states that this has helped      Interval history/Current status: In the hospital the patient was found to have hyperglycemia with a blood glucose of 387. Previously there was no diagnosis of hyperglycemia  The patient does report his blood sugar at home has been 237-247 fasting over the past 5 days  Hga1c 1/24/23 was 6.6  The patient was not started on medications    Overall he states his chest pain has improved but does continue with some shortness of breath.   He states his edema has improved but not significantly despite the increase in his Lasix  Does continue to wear oxygen at 4 L per nasal cannula at night  He states he is watching his fluid restriction  Patient Active Problem List   Diagnosis    Crush injury of right foot    Shortness of breath    Other chest pain    Erectile dysfunction    Hyperglycemia    Anxiety    Depression    History of alcohol abuse    Fatigue    OANH (obstructive sleep apnea)    Hypersomnia    Chronic obstructive pulmonary disease (HCC)    Lumbar radiculopathy    HNP (herniated nucleus pulposus), lumbar    Spondylosis without myelopathy or radiculopathy, lumbar region    DDD (degenerative disc disease), lumbar    Lumbar spine pain    Acute respiratory distress    Class 3 severe obesity with body mass index (BMI) of 45.0 to 49.9 in adult [facility])    Pneumonia, primary atypical    Acute respiratory failure with hypoxia (HCC)    Moderate protein-calorie malnutrition (HCC)    Acute respiratory failure (HCC)    Acute on chronic respiratory failure with hypoxemia (HCC)    Acute diastolic congestive heart failure (HCC)    Hyperlipidemia    Insomnia    Morbid obesity with BMI of 45.0-49.9, adult ([facility]    Abnormal cardiovascular stress test    Coronary artery disease involving native heart    S/P three vessel coronary artery bypass    Acute kidney injury ([facility]    Chronic combined systolic and diastolic congestive heart failure (HCC)    Mobitz type I Wenckebach atrioventricular block    Asystole ([facility]    Ischemic cardiomyopathy    Diverticulitis of colon    Coronary artery disease involving native coronary artery of native heart with unstable angina pectoris (HCC)    CHB (complete heart block) ([facility])    Sinus bradycardia    Cellulitis    Acute deep vein thrombosis (DVT) of left upper extremity (HCC)    Cellulitis of chest wall    Painful orthopaedic hardware ([facility])    SSS (sick sinus syndrome) (HCC)    Observed sleep apnea    ASHD (arteriosclerotic heart disease)    Pacemaker    Chronic total occlusion of coronary artery    Ingrowing nail    Tinea unguium    Pain in left toe(s)    Other hammer toe(s) (acquired), left foot    CAD in native artery       Medications listed as ordered at the time of discharge from hospital     Medication List            Accurate as of February 16, 2023 11:59 PM. If you have any questions, ask your nurse or doctor.                 START taking these medications      metFORMIN 500 MG tablet  Commonly known as: GLUCOPHAGE  Take 1 tablet by mouth 2 times daily (with meals)  Started by: ANICETO Sears CNP            CHANGE how you take these medications      furosemide 40 MG tablet  Commonly known as: LASIX  Take 1 tablet by mouth as needed (for weight grater than 300lbs)  What changed:   how much to take  when to take this            CONTINUE taking these medications      * albuterol (2.5 MG/3ML) 0.083% nebulizer solution  Commonly known as: PROVENTIL  Take 3 mLs by nebulization every 6 hours as needed for Wheezing or Shortness of Breath     * albuterol sulfate  (90 Base) MCG/ACT inhaler  Commonly known as: Proventil HFA  Inhale 2 puffs into the lungs every 4 hours as needed for Wheezing or Shortness of Breath     amitriptyline 10 MG tablet  Commonly known as: ELAVIL  Take 1 tablet by mouth nightly     aspirin 81 MG EC tablet  Take 1 tablet by mouth daily     atorvastatin 80 MG tablet  Commonly known as: LIPITOR  Take 1 tablet by mouth nightly     benzonatate 200 MG capsule  Commonly known as: TESSALON  TAKE ONE CAPSULE BY MOUTH THREE TIMES A DAY AS NEEDED FOR COUGH     brexpiprazole 1 MG Tabs tablet  Commonly known as: REXULTI     clopidogrel 75 MG tablet  Commonly known as: PLAVIX  Take 1 tablet by mouth daily     DULoxetine 60 MG extended release capsule  Commonly known as: CYMBALTA     fish oil 1000 MG capsule  Take 2 capsules by mouth 2 times daily     HYDROcodone-acetaminophen  MG per tablet  Commonly known as: NORCO     hydrOXYzine pamoate 25 MG capsule  Commonly known as: VISTARIL     isosorbide mononitrate 120 MG extended release tablet  Commonly known as: IMDUR  Take 1 tablet by mouth daily     lansoprazole 30 MG delayed release capsule  Commonly known as: PREVACID  TAKE ONE CAPSULE BY MOUTH DAILY     metOLazone 5 MG tablet  Commonly known as: ZAROXOLYN  Take 1 tablet by mouth once a week 30 minutes before taking Lasix     metoprolol succinate 25 MG extended release tablet  Commonly known as: TOPROL XL  TAKE ONE TABLET BY MOUTH DAILY     nitroGLYCERIN 0.4 MG SL tablet  Commonly known as: Nitrostat  Place 1 tablet under the tongue every 5 minutes as needed for Chest pain     ondansetron 4 MG tablet  Commonly known as: ZOFRAN  TAKE ONE TABLET BY MOUTH DAILY AS NEEDED FOR NAUSEA AND VOMITING     OXYGEN     pregabalin 150 MG capsule  Commonly known as: LYRICA     ranolazine 500 MG extended release tablet  Commonly known as: RANEXA  Take 1 tablet by mouth 2 times daily     rOPINIRole 1 MG tablet  Commonly known as: Requip  Take 1 tablet by mouth nightly     spironolactone 25 MG tablet  Commonly known as: ALDACTONE     therapeutic multivitamin-minerals tablet     torsemide 20 MG tablet  Commonly known as: DEMADEX     traZODone 100 MG tablet  Commonly known as: DESYREL     Trelegy Ellipta 100-62.5-25 MCG/ACT Aepb inhaler  Generic drug: fluticasone-umeclidin-vilant  Inhale 1 puff into the lungs daily     Vitamin D3 1.25 MG (40365 UT) Caps  TAKE ONE CAPSULE BY MOUTH ONCE WEEKLY           * This list has 2 medication(s) that are the same as other medications prescribed for you. Read the directions carefully, and ask your doctor or other care provider to review them with you.                   Where to Get Your Medications        These medications were sent to [facility] - [place], [place] - [address] [place] [address] - P [phone] - F [phone]  [address] [place] [id number]      Phone: [phone]   metFORMIN 500 MG tablet           Medications marked \"taking\" at this time  Outpatient Medications Marked as Taking for the 2/16/23 encounter (Office Visit) with ANICETO Rinaldi - CNP   Medication Sig Dispense Refill    spironolactone (ALDACTONE) 25 MG tablet Take 25 mg by mouth daily      metFORMIN  (GLUCOPHAGE) 500 MG tablet Take 1 tablet by mouth 2 times daily (with meals) 60 tablet 1    pregabalin (LYRICA) 150 MG capsule Take 150 mg by mouth nightly.       brexpiprazole (REXULTI) 1 MG TABS tablet Take 1 mg by mouth daily      DULoxetine (CYMBALTA) 60 MG extended release capsule Take 60 mg by mouth daily      traZODone (DESYREL) 100 MG tablet Take 100 mg by mouth nightly      torsemide (DEMADEX) 20 MG tablet Take 40 mg by mouth daily Takes a total of 40mg in AM ( 2 tablets)      hydrOXYzine pamoate (VISTARIL) 25 MG capsule Take 25 mg by mouth 3 times daily as needed for Itching      HYDROcodone-acetaminophen (NORCO)  MG per tablet Take 1 tablet by mouth every 6 hours as needed for Pain.      metOLazone (ZAROXOLYN) 5 MG tablet Take 1 tablet by mouth once a week 30 minutes before taking Lasix 30 tablet 1    benzonatate (TESSALON) 200 MG capsule TAKE ONE CAPSULE BY MOUTH THREE TIMES A DAY AS NEEDED FOR COUGH 30 capsule 0    Cholecalciferol (VITAMIN D3) 1.25 MG (25458 UT) CAPS TAKE ONE CAPSULE BY MOUTH ONCE WEEKLY 4 capsule 0    ondansetron (ZOFRAN) 4 MG tablet TAKE ONE TABLET BY MOUTH DAILY AS NEEDED FOR NAUSEA AND VOMITING 30 tablet 0    rOPINIRole (REQUIP) 1 MG tablet Take 1 tablet by mouth nightly 30 tablet 2    amitriptyline (ELAVIL) 10 MG tablet Take 1 tablet by mouth nightly 30 tablet 1    metoprolol succinate (TOPROL XL) 25 MG extended release tablet TAKE ONE TABLET BY MOUTH DAILY 90 tablet 1    ranolazine (RANEXA) 500 MG extended release tablet Take 1 tablet by mouth 2 times daily 60 tablet 2    isosorbide mononitrate (IMDUR) 120 MG extended release tablet Take 1 tablet by mouth daily 30 tablet 3    fluticasone-umeclidin-vilant (TRELEGY ELLIPTA) 100-62.5-25 MCG/INH AEPB Inhale 1 puff into the lungs daily 3 each 1    albuterol (PROVENTIL) (2.5 MG/3ML) 0.083% nebulizer solution Take 3 mLs by nebulization every 6 hours as needed for Wheezing or Shortness of Breath 360 each 5    albuterol sulfate HFA (PROVENTIL HFA) 108 (90 Base) MCG/ACT inhaler Inhale 2 puffs into the lungs every 4 hours as needed for Wheezing or Shortness of Breath 3 each 1    lansoprazole (PREVACID) 30 MG delayed release capsule TAKE ONE CAPSULE BY MOUTH DAILY 30 capsule 1    Omega-3 Fatty Acids (FISH OIL) 1000 MG CAPS Take 2 capsules by mouth 2 times daily 90 capsule 3    clopidogrel (PLAVIX) 75 MG tablet Take 1 tablet by mouth daily 90 tablet 3    furosemide (LASIX) 40 MG tablet Take 1 tablet by mouth as needed (for weight grater than 300lbs) (Patient taking differently: Take 80 mg by mouth 2 times daily) 90 tablet 3    atorvastatin (LIPITOR) 80 MG tablet Take 1 tablet by mouth nightly 90 tablet 3    Multiple Vitamins-Minerals (THERAPEUTIC MULTIVITAMIN-MINERALS) tablet Take 1 tablet by mouth daily      OXYGEN Inhale 4 L into the lungs as needed      nitroGLYCERIN (NITROSTAT) 0.4 MG SL tablet Place 1 tablet under the tongue every 5 minutes as needed for Chest pain 25 tablet 3    aspirin 81 MG EC tablet Take 1 tablet by mouth daily 30 tablet 0        Medications patient taking as of now reconciled against medications ordered at time of hospital discharge: Yes    A comprehensive review of systems was negative except for what was noted in the HPI.     Objective:    /70 (Site: Left Upper Arm, Position: Sitting, Cuff Size: Large Adult)   Pulse 86   Ht 5' 4\" (1.626 m)   Wt (!) 301 lb (136.5 kg)   SpO2 96%   BMI 51.67 kg/m²   General Appearance: alert and oriented to person, place and time, well developed and well- nourished, in no acute distress  Skin: warm and dry, no rash or erythema  Head: normocephalic and atraumatic  Eyes: pupils equal, round, and reactive to light, extraocular eye movements intact, conjunctivae normal  ENT: tympanic membrane, external ear and ear canal normal bilaterally, nose without deformity, nasal mucosa and turbinates normal without polyps  Neck: supple and non-tender without mass, no thyromegaly or thyroid nodules, no cervical lymphadenopathy  Pulmonary/Chest: clear to auscultation bilaterally- no wheezes, rales or rhonchi, normal air movement, no respiratory distress  Cardiovascular: normal rate, regular rhythm, normal S1 and S2, no murmurs, rubs, clicks, or gallops, distal pulses intact, no carotid bruits  Abdomen: soft, non-tender, non-distended, normal bowel sounds, no masses or organomegaly  Extremities: no cyanosis, clubbing or edema  Musculoskeletal: normal range of motion, no joint swelling, deformity or tenderness  Neurologic: reflexes normal and symmetric, no cranial nerve deficit, gait, coordination and speech normal    An electronic signature was used to authenticate this note.   --Haley Baldwin, ANICETO - CNP

## 2023-02-27 ENCOUNTER — TELEPHONE (OUTPATIENT)
Dept: CARDIAC REHAB | Age: 63
End: 2023-02-27

## 2023-02-27 ENCOUNTER — HOSPITAL ENCOUNTER (OUTPATIENT)
Dept: CARDIAC REHAB | Age: 63
Setting detail: THERAPIES SERIES
Discharge: HOME | End: 2023-02-27

## 2023-03-01 ENCOUNTER — HOSPITAL ENCOUNTER (OUTPATIENT)
Dept: CARDIAC REHAB | Age: 63
Setting detail: THERAPIES SERIES
Discharge: HOME | End: 2023-03-01
Payer: COMMERCIAL

## 2023-03-03 ENCOUNTER — HOSPITAL ENCOUNTER (OUTPATIENT)
Dept: CARDIAC REHAB | Age: 63
Setting detail: THERAPIES SERIES
Discharge: HOME | End: 2023-03-03
Payer: COMMERCIAL

## 2023-03-03 PROCEDURE — 93798 PHYS/QHP OP CAR RHAB W/ECG: CPT

## 2023-03-03 RX ORDER — ONDANSETRON 4 MG/1
TABLET, FILM COATED ORAL
Qty: 30 TABLET | Refills: 2 | Status: SHIPPED | OUTPATIENT
Start: 2023-03-03

## 2023-03-03 RX ORDER — BENZONATATE 200 MG/1
CAPSULE ORAL
Qty: 30 CAPSULE | Refills: 2 | Status: SHIPPED | OUTPATIENT
Start: 2023-03-03

## 2023-03-03 ASSESSMENT — EXERCISE STRESS TEST
PEAK_RPE: 19
PEAK_HR: 110
PEAK_BP: 120/80
PEAK_RPD: 5

## 2023-03-03 ASSESSMENT — PATIENT HEALTH QUESTIONNAIRE - PHQ9
4. FEELING TIRED OR HAVING LITTLE ENERGY: 3
SUM OF ALL RESPONSES TO PHQ QUESTIONS 1-9: 18
9. THOUGHTS THAT YOU WOULD BE BETTER OFF DEAD, OR OF HURTING YOURSELF: 0
10. IF YOU CHECKED OFF ANY PROBLEMS, HOW DIFFICULT HAVE THESE PROBLEMS MADE IT FOR YOU TO DO YOUR WORK, TAKE CARE OF THINGS AT HOME, OR GET ALONG WITH OTHER PEOPLE: 2
SUM OF ALL RESPONSES TO PHQ QUESTIONS 1-9: 18
SUM OF ALL RESPONSES TO PHQ QUESTIONS 1-9: 18
3. TROUBLE FALLING OR STAYING ASLEEP: 3
1. LITTLE INTEREST OR PLEASURE IN DOING THINGS: 3
2. FEELING DOWN, DEPRESSED OR HOPELESS: 1
SUM OF ALL RESPONSES TO PHQ9 QUESTIONS 1 & 2: 4
6. FEELING BAD ABOUT YOURSELF - OR THAT YOU ARE A FAILURE OR HAVE LET YOURSELF OR YOUR FAMILY DOWN: 0
7. TROUBLE CONCENTRATING ON THINGS, SUCH AS READING THE NEWSPAPER OR WATCHING TELEVISION: 3
8. MOVING OR SPEAKING SO SLOWLY THAT OTHER PEOPLE COULD HAVE NOTICED. OR THE OPPOSITE, BEING SO FIGETY OR RESTLESS THAT YOU HAVE BEEN MOVING AROUND A LOT MORE THAN USUAL: 2
SUM OF ALL RESPONSES TO PHQ QUESTIONS 1-9: 18
5. POOR APPETITE OR OVEREATING: 3

## 2023-03-03 NOTE — PLAN OF CARE
Individual Treatment Plan  Cardiac Rehabilitation    Reassessment  Name: Sharri Watson Reassessment: 30 days   : 1960 Primary Diagnosis:   PCI   Age: 63 y.o. Referring Physician:   Aline Dutta MD   MRN: [id number] Primary Care Physician: ANICETO Wilson - ROXANA     Allergies   Allergen Reactions    Dilaudid [Hydromorphone Hcl] Nausea And Vomiting    Codeine Itching   1st session complete 3/3/23. Initial walk test completed. Exercise Assessment  Stages of Change: Preparation   Risk Stratification: High  Fall Risk: Medium  Assistive Devices: cane  Sessions completed:   Key Anti-Hypertensive Meds            spironolactone (ALDACTONE) 25 MG tablet    Class: Historical Med    torsemide (DEMADEX) 20 MG tablet    Class: Historical Med    metOLazone (ZAROXOLYN) 5 MG tablet    Sig - Route: Take 1 tablet by mouth once a week 30 minutes before taking Lasix - Oral    metoprolol succinate (TOPROL XL) 25 MG extended release tablet    Sig: TAKE ONE TABLET BY MOUTH DAILY    furosemide (LASIX) 40 MG tablet    Sig - Route: Take 1 tablet by mouth as needed (for weight grater than 300lbs) - Oral    Patient taking differently: Take 80 mg by mouth 2 times daily                 Exercise Prescription        Mode: . NuStep, SciFit, Arm Ergometer, Recumbent Eliptical, and Walking      Frequency: 2-3 days/week supervised      Intensity:RPE 11-14      Current Target Heart Rate: Resting HR + 20-30      New Target Heart Rate zone: Resting HR + 20-30 (only /36 visits completed)      Duration: 25-32+ minutes/day      Resistance Training: Yes, 3 days per week      Progression: Increase exercise by 5 minutes every week to goal of 30 to 50 minutes and Increase repetitions from 10 to 15 and/or sets from 1 to 3      Supplemental oxygen: is on oxygen at 4 L/min per nasal cannula.     Plan  Home Exercise:No  Mode:Walking  Resistance Training: no   Target Goals  Adhere to cardiac exercise guidelines , Increase exercise endurance and stamina , Increase functional capacity as compared to initial assessment , Increase functional capacity as measured by a 6 minute walk or shuttle walk , Maintain SpO2> 90 during exercise , Participate in strength training , and Perform purse-lip breathing during exercise   Previous goals Adhere to cardiac exercise guidelines , Increase exercise endurance and stamina , Increase functional capacity as compared to initial assessment , Increase functional capacity as measured by a 6 minute walk or shuttle walk , Maintain SpO2> 90 during exercise , Participate in strength training , and Perform purse-lip breathing during exercise   Progress toward goal:Pt has completed 1/36 visits. Education  Mr. Austyn Honeycutt was educated on the importance of warm up and cool down, signs and symptoms to report, exercise safety, RPE scale, Kelly Scale of Perceived Exertion use, Importance of physical activity and exercise progression, and equipment orientation    Nutrition Assessment  Stages of Change: Preparation   Ht 5'4\" Wt 296 lb       Rate Your Plate Score:  49   Patient's weight goal/Progress: Down 2 lb from initial assessment. Recommended Diet:Increase consumption of fruit and vegetable to 8-10 servings a day, increase whole grains and low sodium and lowfat proteins, Reduce saturated fat and cholesterol, Follow low sodium diet, Decrease caloric intake, Decrease intake of processed foods, and Decrease intake of refined sugars   Diabetic:Yes; Monitors blood sugars as prescribed No  Key Antihyperglycemic Medications            metFORMIN (GLUCOPHAGE) 500 MG tablet    Sig - Route:  Take 1 tablet by mouth 2 times daily (with meals) - Oral           Lab Results   Component Value Date    LABA1C 6.3 11/11/2021       Lab Results   Component Value Date     GLUCOSE 180 (H) 01/24/2023     LABA1C 6.3 11/11/2021     CHOL 92 01/24/2023     HDL 35 (L) 01/24/2023     LDLCALC 29 01/24/2023     TRIG 140 01/24/2023                 Nutrition Intervention  Attend education classes related to nutrition and Participate in an exercise program that promotes weight loss  Target Goals  Articulate heart healthy diet , Complete cardiac diet classes , Manage blood sugars independently , and Reduce weight   Previous goals Articulate heart healthy diet , Complete cardiac diet classes , Control blood pressure , and Reduce weight   Blood Pressure <140/90 or  <130/80 if diabetic or chronic kidney disease. Progress toward goal: Only completed 1/36 visits. Education  Mr. Isreal López was educated on the importance of daily weight monitoring, signs and symptoms of hypoglycemia, and relationship of diabetes to cardiac disease. Psychosocial Assessment  Stages of Change: Preparation    Psychosocial Test  PHQ-2/9 Today's PHQ:    PHQ Scores 3/3/2023 2/8/2023 1/12/2023 11/23/2022 6/29/2022 11/11/2021 3/1/2021   PHQ2 Score 4 3 5 2 3 6 6   PHQ9 Score 18 12 13 8 17 18 15     Interpretation of Total Score Depression Severity: 1-4 = Minimal depression, 5-9 = Mild depression, 10-14 = Moderate depression, 15-19 = Moderately severe depression, 20-27 = Severe depression        Psychosocial Intervention  Attend education classes related to stress management and relaxation, Clinician/Patient discussion, Learn and utilize stress management and coping skills, and Participate in an exercise program that improves psychosocial symptoms  Target Goals  Previous goals: Return to ADLs/desired activities , Identify a positive support system , Maximize stress management/coping skills , Articulate confidence in adequate treatment of symptoms , and Improve quality of live, self-efficacy and depression screening scores as measured by the appropriate tool   Progress toward goal:Pt PHQ-9 score increased. PCP notified.    New goal: Return to ADLs/desired activities , Identify a positive support system , Maximize stress management/coping skills , Articulate confidence in adequate treatment of symptoms , and Improve quality of live, self-efficacy and depression screening scores as measured by the appropriate tool   Stages of Change: Preparation  Barriers to Learning: Cognitive or learning impaired; Pt is unable to read. Pt can write name. Personal Learning Style:Verbal and Video     Education Intervention/Learning Needs Identified  Diabetes, Exercise, Fall prevention, Heart Failure, Infection prevention, Medications, Nutrition, Risk factor for CAD, Signs and symptoms of MI/CVA, Stregnth training, Stress management and relaxation, and Weight management  Tobacco Assessment  Social History     Tobacco Use   Smoking Status Former    Packs/day: 2.00    Years: 35.00    Pack years: 70.00    Types: Cigarettes    Quit date: 3/1/2020    Years since quitting: 3.0   Smokeless Tobacco Never       Target Goals  Previous goals: Adherence to medication regimen , Articulate understanding of self-management and prevention/treatment of cardiac disease , Attend appropriate group education classes , Attend appropriate web education classes , and Restore to highest level of independent functionality   Progress toward goals: Completed 1/36 visits  New goals: Adherence to medication regimen , Articulate understanding of self-management and prevention/treatment of cardiac disease , Attend appropriate group education classes , Attend appropriate web education classes , and Restore to highest level of independent functionality   Education  Mr. Moose Cordoba was educated on the importance of healthy coping techniques and stress management, signs and symptoms of depression, relaxation techniques, and creating positive support systems                     Provider Review and Approval of this ITP    The above treatment plan and goals have been set for your patient during Cardiac Rehabilitation.  Please review and Electronically Cosign      Electronically signed by Chet Osei RN on 3/3/23 at 10:48 AM EST

## 2023-03-03 NOTE — TELEPHONE ENCOUNTER
Juliette Tong is requesting refill(s)   Last OV 1/12/2023 (pertaining to medication)  LR 1/31/23 (per medication requested)  Next office visit scheduled or attempted Yes    If no, reason:  3/16/2023

## 2023-03-05 LAB
GLUCOSE BLD-MCNC: 232 MG/DL (ref 70–99)
PERFORMED ON: ABNORMAL

## 2023-03-06 ENCOUNTER — HOSPITAL ENCOUNTER (OUTPATIENT)
Dept: CARDIAC REHAB | Age: 63
Setting detail: THERAPIES SERIES
Discharge: HOME | End: 2023-03-06
Payer: COMMERCIAL

## 2023-03-10 ENCOUNTER — HOSPITAL ENCOUNTER (OUTPATIENT)
Dept: CARDIAC REHAB | Age: 63
Setting detail: THERAPIES SERIES
Discharge: HOME | End: 2023-03-10
Payer: COMMERCIAL

## 2023-03-10 NOTE — PROGRESS NOTES
Call received from patient. Patient reports >15 lb weight gain since Monday. Patient reports taking \"7 water pills\". Patient plans to call [facility] De Los [facility] and see what he should do. Advised patient to be seen today or go to ED. Patient wanted to talk to Al[facility] De Los Santos before going to ED. Today's cardiac rehab appointment cancelled.

## 2023-03-13 ENCOUNTER — APPOINTMENT (OUTPATIENT)
Dept: GENERAL RADIOLOGY | Age: 63
DRG: 194 | End: 2023-03-13
Payer: COMMERCIAL

## 2023-03-13 ENCOUNTER — HOSPITAL ENCOUNTER (OUTPATIENT)
Dept: CARDIAC REHAB | Age: 63
Setting detail: THERAPIES SERIES
Discharge: HOME | End: 2023-03-13
Payer: COMMERCIAL

## 2023-03-13 ENCOUNTER — TELEPHONE (OUTPATIENT)
Dept: PULMONOLOGY | Age: 63
End: 2023-03-13

## 2023-03-13 ENCOUNTER — HOSPITAL ENCOUNTER (INPATIENT)
Age: 63
LOS: 4 days | Discharge: HOME | DRG: 194 | End: 2023-03-17
Attending: STUDENT IN AN ORGANIZED HEALTH CARE EDUCATION/TRAINING PROGRAM | Admitting: INTERNAL MEDICINE
Payer: COMMERCIAL

## 2023-03-13 ENCOUNTER — APPOINTMENT (OUTPATIENT)
Dept: CT IMAGING | Age: 63
DRG: 194 | End: 2023-03-13
Payer: COMMERCIAL

## 2023-03-13 DIAGNOSIS — R06.02 SOB (SHORTNESS OF BREATH): ICD-10-CM

## 2023-03-13 DIAGNOSIS — I50.9 ACUTE ON CHRONIC CONGESTIVE HEART FAILURE, UNSPECIFIED HEART FAILURE TYPE (HCC): Primary | ICD-10-CM

## 2023-03-13 DIAGNOSIS — J44.9 MODERATE COPD (CHRONIC OBSTRUCTIVE PULMONARY DISEASE) (HCC): Primary | ICD-10-CM

## 2023-03-13 DIAGNOSIS — R07.9 CHEST PAIN, UNSPECIFIED TYPE: ICD-10-CM

## 2023-03-13 PROBLEM — M25.511 ACUTE PAIN OF RIGHT SHOULDER: Status: ACTIVE | Noted: 2023-03-13

## 2023-03-13 PROBLEM — I50.43 ACUTE ON CHRONIC COMBINED SYSTOLIC AND DIASTOLIC CHF (CONGESTIVE HEART FAILURE) (HCC): Status: ACTIVE | Noted: 2023-03-13

## 2023-03-13 LAB
A/G RATIO: 1.3 (ref 1.1–2.2)
ALBUMIN SERPL-MCNC: 4.2 G/DL (ref 3.4–5)
ALP BLD-CCNC: 91 U/L (ref 40–129)
ALT SERPL-CCNC: 41 U/L (ref 10–40)
ANION GAP SERPL CALCULATED.3IONS-SCNC: 14 MMOL/L (ref 3–16)
AST SERPL-CCNC: 29 U/L (ref 15–37)
BASOPHILS ABSOLUTE: 0.1 K/UL (ref 0–0.2)
BASOPHILS RELATIVE PERCENT: 1.2 %
BILIRUB SERPL-MCNC: 1.3 MG/DL (ref 0–1)
BUN BLDV-MCNC: 19 MG/DL (ref 7–20)
CALCIUM SERPL-MCNC: 9.7 MG/DL (ref 8.3–10.6)
CHLORIDE BLD-SCNC: 89 MMOL/L (ref 99–110)
CO2: 29 MMOL/L (ref 21–32)
CREAT SERPL-MCNC: 1 MG/DL (ref 0.8–1.3)
D DIMER: 0.88 UG/ML FEU (ref 0–0.6)
EKG ATRIAL RATE: 108 BPM
EKG DIAGNOSIS: NORMAL
EKG P AXIS: 49 DEGREES
EKG P-R INTERVAL: 192 MS
EKG Q-T INTERVAL: 324 MS
EKG QRS DURATION: 88 MS
EKG QTC CALCULATION (BAZETT): 434 MS
EKG R AXIS: 256 DEGREES
EKG T AXIS: 66 DEGREES
EKG VENTRICULAR RATE: 108 BPM
EOSINOPHILS ABSOLUTE: 0.1 K/UL (ref 0–0.6)
EOSINOPHILS RELATIVE PERCENT: 0.6 %
GFR SERPL CREATININE-BSD FRML MDRD: >60 ML/MIN/{1.73_M2}
GLUCOSE BLD-MCNC: 254 MG/DL (ref 70–99)
GLUCOSE BLD-MCNC: 262 MG/DL (ref 70–99)
HCT VFR BLD CALC: 50.9 % (ref 40.5–52.5)
HEMOGLOBIN: 16.7 G/DL (ref 13.5–17.5)
INFLUENZA A: NOT DETECTED
INFLUENZA B: NOT DETECTED
LACTIC ACID, SEPSIS: 1.9 MMOL/L (ref 0.4–1.9)
LACTIC ACID, SEPSIS: 3.4 MMOL/L (ref 0.4–1.9)
LYMPHOCYTES ABSOLUTE: 1.6 K/UL (ref 1–5.1)
LYMPHOCYTES RELATIVE PERCENT: 12.5 %
MCH RBC QN AUTO: 29.1 PG (ref 26–34)
MCHC RBC AUTO-ENTMCNC: 32.9 G/DL (ref 31–36)
MCV RBC AUTO: 88.5 FL (ref 80–100)
MONOCYTES ABSOLUTE: 1.1 K/UL (ref 0–1.3)
MONOCYTES RELATIVE PERCENT: 8.9 %
NEUTROPHILS ABSOLUTE: 9.8 K/UL (ref 1.7–7.7)
NEUTROPHILS RELATIVE PERCENT: 76.8 %
PDW BLD-RTO: 16 % (ref 12.4–15.4)
PERFORMED ON: ABNORMAL
PLATELET # BLD: 204 K/UL (ref 135–450)
PMV BLD AUTO: 8.8 FL (ref 5–10.5)
POTASSIUM REFLEX MAGNESIUM: 3.8 MMOL/L (ref 3.5–5.1)
PRO-BNP: 80 PG/ML (ref 0–124)
PROCALCITONIN: 0.22 NG/ML (ref 0–0.15)
RBC # BLD: 5.75 M/UL (ref 4.2–5.9)
SARS-COV-2 RNA, RT PCR: NOT DETECTED
SODIUM BLD-SCNC: 132 MMOL/L (ref 136–145)
TOTAL PROTEIN: 7.4 G/DL (ref 6.4–8.2)
TROPONIN: <0.01 NG/ML
TSH SERPL DL<=0.05 MIU/L-ACNC: 1.53 UIU/ML (ref 0.27–4.2)
WBC # BLD: 12.8 K/UL (ref 4–11)

## 2023-03-13 PROCEDURE — 85379 FIBRIN DEGRADATION QUANT: CPT

## 2023-03-13 PROCEDURE — 99285 EMERGENCY DEPT VISIT HI MDM: CPT

## 2023-03-13 PROCEDURE — 80053 COMPREHEN METABOLIC PANEL: CPT

## 2023-03-13 PROCEDURE — 93010 ELECTROCARDIOGRAM REPORT: CPT | Performed by: INTERNAL MEDICINE

## 2023-03-13 PROCEDURE — 2060000000 HC ICU INTERMEDIATE R&B

## 2023-03-13 PROCEDURE — 93798 PHYS/QHP OP CAR RHAB W/ECG: CPT

## 2023-03-13 PROCEDURE — 71260 CT THORAX DX C+: CPT | Performed by: STUDENT IN AN ORGANIZED HEALTH CARE EDUCATION/TRAINING PROGRAM

## 2023-03-13 PROCEDURE — 94761 N-INVAS EAR/PLS OXIMETRY MLT: CPT

## 2023-03-13 PROCEDURE — 6370000000 HC RX 637 (ALT 250 FOR IP): Performed by: INTERNAL MEDICINE

## 2023-03-13 PROCEDURE — 84484 ASSAY OF TROPONIN QUANT: CPT

## 2023-03-13 PROCEDURE — 83880 ASSAY OF NATRIURETIC PEPTIDE: CPT

## 2023-03-13 PROCEDURE — 36415 COLL VENOUS BLD VENIPUNCTURE: CPT

## 2023-03-13 PROCEDURE — 83605 ASSAY OF LACTIC ACID: CPT

## 2023-03-13 PROCEDURE — 83036 HEMOGLOBIN GLYCOSYLATED A1C: CPT

## 2023-03-13 PROCEDURE — 99223 1ST HOSP IP/OBS HIGH 75: CPT | Performed by: NURSE PRACTITIONER

## 2023-03-13 PROCEDURE — 84443 ASSAY THYROID STIM HORMONE: CPT

## 2023-03-13 PROCEDURE — 87636 SARSCOV2 & INF A&B AMP PRB: CPT

## 2023-03-13 PROCEDURE — 6370000000 HC RX 637 (ALT 250 FOR IP): Performed by: NURSE PRACTITIONER

## 2023-03-13 PROCEDURE — 73030 X-RAY EXAM OF SHOULDER: CPT

## 2023-03-13 PROCEDURE — 6360000004 HC RX CONTRAST MEDICATION: Performed by: STUDENT IN AN ORGANIZED HEALTH CARE EDUCATION/TRAINING PROGRAM

## 2023-03-13 PROCEDURE — 6360000002 HC RX W HCPCS: Performed by: NURSE PRACTITIONER

## 2023-03-13 PROCEDURE — 85025 COMPLETE CBC W/AUTO DIFF WBC: CPT

## 2023-03-13 PROCEDURE — 93005 ELECTROCARDIOGRAM TRACING: CPT | Performed by: STUDENT IN AN ORGANIZED HEALTH CARE EDUCATION/TRAINING PROGRAM

## 2023-03-13 PROCEDURE — 2700000000 HC OXYGEN THERAPY PER DAY

## 2023-03-13 PROCEDURE — 2580000003 HC RX 258: Performed by: NURSE PRACTITIONER

## 2023-03-13 PROCEDURE — 84145 PROCALCITONIN (PCT): CPT

## 2023-03-13 RX ORDER — AMITRIPTYLINE HYDROCHLORIDE 10 MG/1
10 TABLET, FILM COATED ORAL NIGHTLY
Status: DISCONTINUED | OUTPATIENT
Start: 2023-03-13 | End: 2023-03-17 | Stop reason: HOSPADM

## 2023-03-13 RX ORDER — SPIRONOLACTONE 25 MG/1
25 TABLET ORAL DAILY
Status: DISCONTINUED | OUTPATIENT
Start: 2023-03-14 | End: 2023-03-17 | Stop reason: HOSPADM

## 2023-03-13 RX ORDER — RANOLAZINE 500 MG/1
500 TABLET, EXTENDED RELEASE ORAL 2 TIMES DAILY
Status: DISCONTINUED | OUTPATIENT
Start: 2023-03-13 | End: 2023-03-17 | Stop reason: HOSPADM

## 2023-03-13 RX ORDER — ISOSORBIDE MONONITRATE 60 MG/1
120 TABLET, EXTENDED RELEASE ORAL DAILY
Status: DISCONTINUED | OUTPATIENT
Start: 2023-03-14 | End: 2023-03-17 | Stop reason: HOSPADM

## 2023-03-13 RX ORDER — BUDESONIDE AND FORMOTEROL FUMARATE DIHYDRATE 160; 4.5 UG/1; UG/1
2 AEROSOL RESPIRATORY (INHALATION) 2 TIMES DAILY
Status: DISCONTINUED | OUTPATIENT
Start: 2023-03-13 | End: 2023-03-17 | Stop reason: HOSPADM

## 2023-03-13 RX ORDER — DULOXETIN HYDROCHLORIDE 60 MG/1
60 CAPSULE, DELAYED RELEASE ORAL DAILY
Status: DISCONTINUED | OUTPATIENT
Start: 2023-03-13 | End: 2023-03-13

## 2023-03-13 RX ORDER — ATORVASTATIN CALCIUM 40 MG/1
80 TABLET, FILM COATED ORAL DAILY
Status: DISCONTINUED | OUTPATIENT
Start: 2023-03-14 | End: 2023-03-17 | Stop reason: HOSPADM

## 2023-03-13 RX ORDER — DEXTROSE MONOHYDRATE 100 MG/ML
INJECTION, SOLUTION INTRAVENOUS CONTINUOUS PRN
Status: DISCONTINUED | OUTPATIENT
Start: 2023-03-13 | End: 2023-03-17 | Stop reason: HOSPADM

## 2023-03-13 RX ORDER — ACETAMINOPHEN 650 MG/1
650 SUPPOSITORY RECTAL EVERY 6 HOURS PRN
Status: DISCONTINUED | OUTPATIENT
Start: 2023-03-13 | End: 2023-03-17 | Stop reason: HOSPADM

## 2023-03-13 RX ORDER — INSULIN LISPRO 100 [IU]/ML
0-4 INJECTION, SOLUTION INTRAVENOUS; SUBCUTANEOUS NIGHTLY
Status: DISCONTINUED | OUTPATIENT
Start: 2023-03-13 | End: 2023-03-17 | Stop reason: HOSPADM

## 2023-03-13 RX ORDER — ONDANSETRON 4 MG/1
4 TABLET, ORALLY DISINTEGRATING ORAL EVERY 8 HOURS PRN
Status: DISCONTINUED | OUTPATIENT
Start: 2023-03-13 | End: 2023-03-17 | Stop reason: HOSPADM

## 2023-03-13 RX ORDER — SODIUM CHLORIDE 9 MG/ML
INJECTION, SOLUTION INTRAVENOUS PRN
Status: DISCONTINUED | OUTPATIENT
Start: 2023-03-13 | End: 2023-03-17 | Stop reason: HOSPADM

## 2023-03-13 RX ORDER — ENOXAPARIN SODIUM 100 MG/ML
30 INJECTION SUBCUTANEOUS 2 TIMES DAILY
Status: DISCONTINUED | OUTPATIENT
Start: 2023-03-13 | End: 2023-03-17 | Stop reason: HOSPADM

## 2023-03-13 RX ORDER — HYDROCODONE BITARTRATE AND ACETAMINOPHEN 10; 325 MG/1; MG/1
1 TABLET ORAL EVERY 6 HOURS PRN
Status: DISCONTINUED | OUTPATIENT
Start: 2023-03-13 | End: 2023-03-13

## 2023-03-13 RX ORDER — METOPROLOL SUCCINATE 25 MG/1
25 TABLET, EXTENDED RELEASE ORAL DAILY
Status: DISCONTINUED | OUTPATIENT
Start: 2023-03-14 | End: 2023-03-17 | Stop reason: HOSPADM

## 2023-03-13 RX ORDER — BENZONATATE 100 MG/1
100 CAPSULE ORAL 3 TIMES DAILY PRN
Status: DISCONTINUED | OUTPATIENT
Start: 2023-03-13 | End: 2023-03-17 | Stop reason: HOSPADM

## 2023-03-13 RX ORDER — ALBUTEROL SULFATE 90 UG/1
2 AEROSOL, METERED RESPIRATORY (INHALATION) 2 TIMES DAILY
Status: DISCONTINUED | OUTPATIENT
Start: 2023-03-13 | End: 2023-03-17 | Stop reason: HOSPADM

## 2023-03-13 RX ORDER — POTASSIUM CHLORIDE 7.45 MG/ML
10 INJECTION INTRAVENOUS PRN
Status: DISCONTINUED | OUTPATIENT
Start: 2023-03-13 | End: 2023-03-17 | Stop reason: HOSPADM

## 2023-03-13 RX ORDER — ONDANSETRON 2 MG/ML
4 INJECTION INTRAMUSCULAR; INTRAVENOUS EVERY 6 HOURS PRN
Status: DISCONTINUED | OUTPATIENT
Start: 2023-03-13 | End: 2023-03-17 | Stop reason: HOSPADM

## 2023-03-13 RX ORDER — NALOXONE HYDROCHLORIDE 4 MG/.1ML
1 SPRAY NASAL PRN
COMMUNITY
Start: 2023-02-06

## 2023-03-13 RX ORDER — ALBUTEROL SULFATE 2.5 MG/3ML
2.5 SOLUTION RESPIRATORY (INHALATION) EVERY 4 HOURS PRN
Status: DISCONTINUED | OUTPATIENT
Start: 2023-03-13 | End: 2023-03-17 | Stop reason: HOSPADM

## 2023-03-13 RX ORDER — MAGNESIUM SULFATE IN WATER 40 MG/ML
2000 INJECTION, SOLUTION INTRAVENOUS PRN
Status: DISCONTINUED | OUTPATIENT
Start: 2023-03-13 | End: 2023-03-17 | Stop reason: HOSPADM

## 2023-03-13 RX ORDER — CLOPIDOGREL BISULFATE 75 MG/1
75 TABLET ORAL DAILY
Status: DISCONTINUED | OUTPATIENT
Start: 2023-03-14 | End: 2023-03-17 | Stop reason: HOSPADM

## 2023-03-13 RX ORDER — LIDOCAINE 4 G/G
1 PATCH TOPICAL DAILY
Status: DISCONTINUED | OUTPATIENT
Start: 2023-03-13 | End: 2023-03-17 | Stop reason: HOSPADM

## 2023-03-13 RX ORDER — DAPAGLIFLOZIN 10 MG/1
1 TABLET, FILM COATED ORAL DAILY
COMMUNITY
Start: 2023-02-22

## 2023-03-13 RX ORDER — SODIUM CHLORIDE 0.9 % (FLUSH) 0.9 %
5-40 SYRINGE (ML) INJECTION EVERY 12 HOURS SCHEDULED
Status: DISCONTINUED | OUTPATIENT
Start: 2023-03-13 | End: 2023-03-17 | Stop reason: HOSPADM

## 2023-03-13 RX ORDER — M-VIT,TX,IRON,MINS/CALC/FOLIC 27MG-0.4MG
1 TABLET ORAL DAILY
Status: DISCONTINUED | OUTPATIENT
Start: 2023-03-14 | End: 2023-03-17 | Stop reason: HOSPADM

## 2023-03-13 RX ORDER — SODIUM CHLORIDE 0.9 % (FLUSH) 0.9 %
5-40 SYRINGE (ML) INJECTION PRN
Status: DISCONTINUED | OUTPATIENT
Start: 2023-03-13 | End: 2023-03-17 | Stop reason: HOSPADM

## 2023-03-13 RX ORDER — ASPIRIN 81 MG/1
81 TABLET ORAL DAILY
Status: DISCONTINUED | OUTPATIENT
Start: 2023-03-14 | End: 2023-03-17 | Stop reason: HOSPADM

## 2023-03-13 RX ORDER — HYDROCODONE BITARTRATE AND ACETAMINOPHEN 5; 325 MG/1; MG/1
1 TABLET ORAL EVERY 8 HOURS PRN
Status: DISCONTINUED | OUTPATIENT
Start: 2023-03-13 | End: 2023-03-17 | Stop reason: HOSPADM

## 2023-03-13 RX ORDER — ACETAMINOPHEN 325 MG/1
650 TABLET ORAL EVERY 6 HOURS PRN
Status: DISCONTINUED | OUTPATIENT
Start: 2023-03-13 | End: 2023-03-17 | Stop reason: HOSPADM

## 2023-03-13 RX ORDER — INSULIN LISPRO 100 [IU]/ML
0-4 INJECTION, SOLUTION INTRAVENOUS; SUBCUTANEOUS
Status: DISCONTINUED | OUTPATIENT
Start: 2023-03-14 | End: 2023-03-17 | Stop reason: HOSPADM

## 2023-03-13 RX ORDER — FUROSEMIDE 10 MG/ML
40 INJECTION INTRAMUSCULAR; INTRAVENOUS 2 TIMES DAILY
Status: DISCONTINUED | OUTPATIENT
Start: 2023-03-13 | End: 2023-03-14

## 2023-03-13 RX ORDER — ROPINIROLE 1 MG/1
1 TABLET, FILM COATED ORAL DAILY
Status: DISCONTINUED | OUTPATIENT
Start: 2023-03-14 | End: 2023-03-15

## 2023-03-13 RX ORDER — ALBUTEROL SULFATE 2.5 MG/3ML
2.5 SOLUTION RESPIRATORY (INHALATION) EVERY 6 HOURS PRN
Status: DISCONTINUED | OUTPATIENT
Start: 2023-03-13 | End: 2023-03-13

## 2023-03-13 RX ORDER — PANTOPRAZOLE SODIUM 40 MG/1
40 TABLET, DELAYED RELEASE ORAL
Status: DISCONTINUED | OUTPATIENT
Start: 2023-03-14 | End: 2023-03-13

## 2023-03-13 RX ORDER — POTASSIUM CHLORIDE 20 MEQ/1
40 TABLET, EXTENDED RELEASE ORAL PRN
Status: DISCONTINUED | OUTPATIENT
Start: 2023-03-13 | End: 2023-03-17 | Stop reason: HOSPADM

## 2023-03-13 RX ORDER — POLYETHYLENE GLYCOL 3350 17 G/17G
17 POWDER, FOR SOLUTION ORAL DAILY PRN
Status: DISCONTINUED | OUTPATIENT
Start: 2023-03-13 | End: 2023-03-17 | Stop reason: HOSPADM

## 2023-03-13 RX ADMIN — IOPAMIDOL 85 ML: 755 INJECTION, SOLUTION INTRAVENOUS at 15:19

## 2023-03-13 RX ADMIN — FUROSEMIDE 40 MG: 10 INJECTION, SOLUTION INTRAMUSCULAR; INTRAVENOUS at 18:34

## 2023-03-13 RX ADMIN — PREGABALIN 150 MG: 100 CAPSULE ORAL at 21:57

## 2023-03-13 RX ADMIN — ENOXAPARIN SODIUM 30 MG: 100 INJECTION SUBCUTANEOUS at 20:42

## 2023-03-13 RX ADMIN — RANOLAZINE 500 MG: 500 TABLET, FILM COATED, EXTENDED RELEASE ORAL at 21:57

## 2023-03-13 RX ADMIN — AMITRIPTYLINE HYDROCHLORIDE 10 MG: 10 TABLET, FILM COATED ORAL at 21:57

## 2023-03-13 RX ADMIN — HYDROCODONE BITARTRATE AND ACETAMINOPHEN 1 TABLET: 5; 325 TABLET ORAL at 21:57

## 2023-03-13 RX ADMIN — Medication 10 ML: at 20:43

## 2023-03-13 ASSESSMENT — PAIN DESCRIPTION - LOCATION: LOCATION: SHOULDER

## 2023-03-13 ASSESSMENT — PAIN DESCRIPTION - ORIENTATION: ORIENTATION: RIGHT

## 2023-03-13 ASSESSMENT — LIFESTYLE VARIABLES
HOW MANY STANDARD DRINKS CONTAINING ALCOHOL DO YOU HAVE ON A TYPICAL DAY: PATIENT DOES NOT DRINK
HOW OFTEN DO YOU HAVE A DRINK CONTAINING ALCOHOL: PATIENT DECLINED

## 2023-03-13 ASSESSMENT — PAIN DESCRIPTION - DESCRIPTORS: DESCRIPTORS: ACHING

## 2023-03-13 ASSESSMENT — PAIN - FUNCTIONAL ASSESSMENT: PAIN_FUNCTIONAL_ASSESSMENT: ACTIVITIES ARE NOT PREVENTED

## 2023-03-13 ASSESSMENT — PAIN SCALES - GENERAL
PAINLEVEL_OUTOF10: 4
PAINLEVEL_OUTOF10: 7

## 2023-03-13 NOTE — ED PROVIDER NOTES
[address] ED     EMERGENCY DEPARTMENT ENCOUNTER            Pt Name: Yuan Abarca   MRN: [id number]   [place] 1960   Date of evaluation: 3/13/2023   Provider: Papito Perla MD   PCP: ANICETO Bradley - CNP   Note Started: 12:47 PM EDT 3/13/23          CHIEF COMPLAINT     Chief Complaint   Patient presents with    Chest Pain     States recent fatigue, CP for a couple days, recent stents. States holding water     Leg Swelling    Fatigue      HISTORY OF PRESENT ILLNESS:   History from : Patient   Limitations to history : None     Yuan Abarca is a 63 y.o. male who presents complaining of fatigue, generalized malaise, leg swelling and weight gain, and chest pain. Patient states that he had stents placed at the end of January. States that he feels that he is just been going downhill since then. He has had worsening shortness of breath, fatigue, leg swelling and chest pain. Was at cardiac rehab this morning and states \"I was not doing very well. \"  He has 4 L of oxygen at home that he wears as needed but usually does not need to wear it during the day. He states that his pain is across his chest but mostly in his right shoulder, states \"it feels like somebody hit me in the chest with a baseball bat. \"  He states that he has had a cough productive of small amount of green sputum. Denies fevers. States that he has had some weight gain of about 20 pounds over the past few weeks, states that since then he has taken about 10 to 15 pounds off. He denies any other complaints or concerns. Nursing Notes were all reviewed and agreed with, or any disagreements were addressed in the HPI. REVIEW OF SYSTEMS :    Positives and Pertinent negatives as per HPI.       MEDICAL HISTORY   has a past medical history of Acute diverticulitis (04/10/2021), Acute kidney injury ([address] (04/08/2021), Acute on chronic respiratory failure with hypoxemia ([address] Acute respiratory failure ([address] (08/19/2020), Acute respiratory failure with hypoxia ([facility] Anxiety (01/06/2020), Aortic valve calcification (09/2020), CAD in native artery (04/14/2021), Chronic obstructive pulmonary disease ([facility] (06/20/3052), Chronic systolic congestive heart failure ([address] (04/10/2021), Class 3 severe obesity with body mass index (BMI) of 45.0 to 49.9 in [facility]), Coronary artery calcification (09/2020), Coronary artery disease involving native heart with angina pectoris ([facility] (09/22/2020), Crush injury of right foot (07/23/2015), DDD (degenerative disc disease), lumbar (01/06/2020), Depression (01/06/2020), Diverticulitis of colon (04/10/2021), Erectile dysfunction (01/06/2020), Fatigue (01/06/2020), HNP (herniated nucleus pulposus), lumbar (01/06/2020), Hyperglycemia (01/06/2020), Hyperlipidemia, Hypersomnia (01/06/2020), Hypertension, Insomnia, Ischemic cardiomyopathy, Kidney stone, Lumbar radiculopathy (01/06/2020), Lumbar spine pain (01/06/2020), LVH (left ventricular hypertrophy), Mobitz type I Wenckebach atrioventricular block (04/10/2021), Moderate protein-calorie malnutrition ([facility] (03/17/2020), Observed sleep apnea (01/06/2020), OANH (obstructive sleep apnea) (01/06/2020), Pneumonia, primary atypical, Reactive depression (01/06/2020), S/P three vessel coronary artery bypass (10/26/2020), Spondylosis without myelopathy or radiculopathy, lumbar region (01/06/2020), Tobacco abuse (01/06/2020), Tobacco use (01/06/2020), and Wears dentures.     Past Surgical History:   Procedure Laterality Date    ANKLE SURGERY Left 08/02/2021    LEFT FOOT SCREW REMOVAL performed by Peggy Nunes MD at [address] [place] [address]  03/2021    CORONARY ARTERY BYPASS GRAFT N/A 09/25/2020    CORONARY ARTERY BYPASS GRAFTING X3, INTERNAL MAMMARY ARTERY, SAPHENOUS VEIN GRAFT, ON PUMP, STERNAL PLATING, 5 LEVEL BILATERAL INTERCOSTAL NERVE BLOCK, PLATELET GEL APPLICATION performed by Rashard Stern, MD at [address] [facility]  03/16/2011    cystoscopy left ureteroscopy, left retrograde, double J stent placement    FINGER AMPUTATION Right 02/02/2021    RIGHT MIDDLE FINGER IRRIGATION AND DEBRIDEMENT WITH REVISION AMPUTATION AND BONE SHORTENING, POSSIBLE NAIL ABLATION performed by Chong Cowan MD at [facility]      Matrixectomy-NaOH/Phenol    PAIN MANAGEMENT PROCEDURE Right 12/24/2019    RIGHT LUMBAR FIVE SACRAL ONE TRANSFORAMINAL EPIDURAL STEROID INJECTION SITE CONFIRMED BY FLUOROSCOPY performed by Fransico Gonsales MD at [address] Right 01/07/2020    RIGHT LUMBAR FOUR AND LUMBAR FIVE TRANSFORAMINAL EPIDURAL STEROID INJECTION SITE CONFIRMED BY FLUOROSCOPY performed by Fransico Gonsales MD at [address] [place] [address]       Previous Medications    ALBUTEROL (PROVENTIL) (2.5 MG/3ML) 0.083% NEBULIZER SOLUTION    Take 3 mLs by nebulization every 6 hours as needed for Wheezing or Shortness of Breath    ALBUTEROL SULFATE HFA (PROVENTIL HFA) 108 (90 BASE) MCG/ACT INHALER    Inhale 2 puffs into the lungs every 4 hours as needed for Wheezing or Shortness of Breath    AMITRIPTYLINE (ELAVIL) 10 MG TABLET    Take 1 tablet by mouth nightly    ASPIRIN 81 MG EC TABLET    Take 1 tablet by mouth daily    ATORVASTATIN (LIPITOR) 80 MG TABLET    Take 1 tablet by mouth nightly    BENZONATATE (TESSALON) 200 MG CAPSULE    TAKE ONE CAPSULE BY MOUTH THREE TIMES A DAY AS NEEDED FOR COUGH    BREXPIPRAZOLE (REXULTI) 1 MG TABS TABLET    Take 1 mg by mouth daily    CHOLECALCIFEROL (VITAMIN D3) 1.25 MG (22395 UT) CAPS    TAKE ONE CAPSULE BY MOUTH ONCE WEEKLY    CLOPIDOGREL (PLAVIX) 75 MG TABLET    Take 1 tablet by mouth daily    DULOXETINE (CYMBALTA) 60 MG EXTENDED RELEASE CAPSULE    Take 60 mg by mouth daily    FLUTICASONE-UMECLIDIN-VILANT (TRELEGY ELLIPTA) 100-62.5-25 MCG/INH AEPB    Inhale 1 puff into the lungs daily    FUROSEMIDE (LASIX) 40 MG TABLET    Take 1 tablet by mouth as needed (for weight grater than 300lbs)    HYDROCODONE-ACETAMINOPHEN (NORCO)  MG PER TABLET    Take 1 tablet by mouth every 6 hours as needed for Pain. HYDROXYZINE PAMOATE (VISTARIL) 25 MG CAPSULE    Take 25 mg by mouth 3 times daily as needed for Itching    ISOSORBIDE MONONITRATE (IMDUR) 120 MG EXTENDED RELEASE TABLET    Take 1 tablet by mouth daily    LANSOPRAZOLE (PREVACID) 30 MG DELAYED RELEASE CAPSULE    TAKE ONE CAPSULE BY MOUTH DAILY    METFORMIN (GLUCOPHAGE) 500 MG TABLET    Take 1 tablet by mouth 2 times daily (with meals)    METOLAZONE (ZAROXOLYN) 5 MG TABLET    Take 1 tablet by mouth once a week 30 minutes before taking Lasix    METOPROLOL SUCCINATE (TOPROL XL) 25 MG EXTENDED RELEASE TABLET    TAKE ONE TABLET BY MOUTH DAILY    MULTIPLE VITAMINS-MINERALS (THERAPEUTIC MULTIVITAMIN-MINERALS) TABLET    Take 1 tablet by mouth daily    NITROGLYCERIN (NITROSTAT) 0.4 MG SL TABLET    Place 1 tablet under the tongue every 5 minutes as needed for Chest pain    OMEGA-3 FATTY ACIDS (FISH OIL) 1000 MG CAPS    Take 2 capsules by mouth 2 times daily    ONDANSETRON (ZOFRAN) 4 MG TABLET    TAKE ONE TABLET BY MOUTH DAILY AS NEEDED FOR NAUSEA AND VOMITING    OXYGEN    Inhale 4 L into the lungs as needed    PREGABALIN (LYRICA) 150 MG CAPSULE    Take 150 mg by mouth nightly.     RANOLAZINE (RANEXA) 500 MG EXTENDED RELEASE TABLET    Take 1 tablet by mouth 2 times daily    ROPINIROLE (REQUIP) 1 MG TABLET    Take 1 tablet by mouth nightly    SPIRONOLACTONE (ALDACTONE) 25 MG TABLET    Take 25 mg by mouth daily    TORSEMIDE (DEMADEX) 20 MG TABLET    Take 40 mg by mouth daily Takes a total of 40mg in AM ( 2 tablets)    TRAZODONE (DESYREL) 100 MG TABLET    Take 100 mg by mouth nightly      SCREENINGS                           WA Assessment  BP: 127/67  Heart Rate: (!) 107                  PHYSICAL EXAM :  ED Triage Vitals   BP Temp Temp src Pulse Resp SpO2 Height Weight   -- -- -- -- -- -- -- -- GENERAL APPEARANCE: Awake and alert. Cooperative. No acute distress. HEAD: Normocephalic. Atraumatic. EYES: PERRL. EOM's grossly intact. ENT: Mucous membranes are moist.   NECK: Supple, trachea midline. HEART: RRR. Normal S1, S2. Diminished breath sounds bilaterally. Tenderness to palpation over anterior chest and right shoulder. No obvious deformities or crepitus. LUNGS: Respirations unlabored. CTAB. Good air exchange. No wheezes, rales, or rhonchi. Speaking comfortably in full sentences. ABDOMEN: Soft. Non-distended. Non-tender. No guarding or rebound. Normal Bowel sounds. EXTREMITIES: 1+ pitting edema bilateral lower extremities to the knee. No acute deformities. SKIN: Warm and dry. No acute rashes. NEUROLOGICAL: Alert and oriented X 3. CN II-XII intact. No gross facial drooping. Strength 5/5 in all extremities. Sensation intact. No pronator drift. Normal coordination. Gait normal.  PSYCHIATRIC: Normal mood and affect. DIAGNOSTIC RESULTS     LABS:   Labs Reviewed   CBC WITH AUTO DIFFERENTIAL - Abnormal; Notable for the following components:       Result Value    WBC 12.8 (*)     RDW 16.0 (*)     Neutrophils Absolute 9.8 (*)     All other components within normal limits   COMPREHENSIVE METABOLIC PANEL W/ REFLEX TO MG FOR LOW K - Abnormal; Notable for the following components:    Sodium 132 (*)     Chloride 89 (*)     Glucose 262 (*)     Total Bilirubin 1.3 (*)     ALT 41 (*)     All other components within normal limits   D-DIMER, QUANTITATIVE - Abnormal; Notable for the following components:    D-Dimer, Quant 0.88 (*)     All other components within normal limits   LACTATE, SEPSIS - Abnormal; Notable for the following components:    Lactic Acid, Sepsis 3.4 (*)     All other components within normal limits   COVID-19 & INFLUENZA COMBO   TROPONIN   BRAIN NATRIURETIC PEPTIDE   LACTATE, SEPSIS      When ordered only abnormal lab results are displayed.  All other labs were within normal range or not returned as of this dictation. RADIOLOGY:      Non-plain film images such as CT, Ultrasound and MRI are read by the radiologist. Plain radiographic images are visualized and preliminarily interpreted by the ED Provider with the below findings:   Interpretation per the Radiologist below, if available at the time of this note:     CT CHEST PULMONARY EMBOLISM W CONTRAST   Preliminary Result   No evidence of pulmonary embolism. No evidence of thoracic aortic aneurysm or dissection. Evidence of previous CABG. Minimal dependent atelectatic changes at the posterior lung bases   bilaterally. No other focal abnormality or acute process in the lungs. No   evidence of pleural effusion or pneumothorax. Evidence of at least moderate   diffuse fatty infiltration in visualized portion of the liver. No subphrenic acute process demonstrated. No results found. EKG: The Ekg interpreted by me shows  sinus tachycardia, pbps=397  Axis is   Left axis deviation  QTc is  normal  Intervals and Durations are unremarkable. ST Segments: nonspecific changes  No significant change from prior EKG dated 1/24/2023    PROCEDURES   Unless otherwise noted below, none     CRITICAL CARE TIME   0         Vitals:    Vitals:    03/13/23 1253 03/13/23 1259 03/13/23 1303 03/13/23 1432   BP: 138/73 103/75 109/65 127/67   Pulse:  (!) 108 (!) 105 (!) 107   Resp:  16 14 24   Temp: 97.9 °F (36.6 °C)      TempSrc: Oral      SpO2:  96% 97% 96%   Weight:                 Is this patient to be included in the SEP-1 Core Measure due to severe sepsis or septic shock? No   Exclusion criteria - the patient is NOT to be included for SEP-1 Core Measure due to: Infection is not suspected       CC/HPI Summary, DDx, ED Course, and Reassessment: Patient is a 82-year-old male, presenting with concerns for fatigue, right-sided chest pain, leg swelling and weight gain.   Patient had some stents placed in January and states that he has not been doing well since then. Was at cardiac rehab this morning and they sent him to the ER because he was dyspneic and hypoxic. He does have oxygen that he can use at home, typically wears 4 L but only wears it at night. He states that he has been having to wear it more recently. Has had a cough productive of a small amount of sputum as well. Labs are overall reassuring. He is slightly tachycardic here. I am concerned clinically for fluid overload and so fluids were withheld. D-dimer was slightly elevated and so CT with PE protocol was performed which was negative for acute concerning findings. No evidence of pneumonia. His lactate was slightly elevated however I have no current evidence of infection. Feel the patient would benefit from hospitalization for further work-up and treatment of his condition and potentially cardiac echo. He is comfortable and in agreement with plan of care. Patient was given the following medications:   Medications   iopamidol (ISOVUE-370) 76 % injection 85 mL (85 mLs IntraVENous Given 3/13/23 3806)        CONSULTS:   IP CONSULT TO HOSPITALIST   Discussion with Other Professionals: None   {Social Determinants: None   Chronic Conditions:  CAD, COPD   Records Reviewed: Hospitalization at [facility] from 1/24/2023    Disposition Considerations: Admitted  I am the Primary Clinician of Record. FINAL IMPRESSION    1. Acute on chronic congestive heart failure, unspecified heart failure type (HCC)    2. Chest pain, unspecified type           DISPOSITION/PLAN     PATIENT REFERRED TO:   No follow-up provider specified.      DISCHARGE MEDICATIONS:   New Prescriptions    No medications on file        DISCONTINUED MEDICATIONS:   Discontinued Medications    No medications on file              (Please note that portions of this note were completed with a voice recognition program.  Efforts were made to edit the dictations but occasionally words are mis-transcribed.)       Shobha Krishna MD (electronically signed)             Shobha Krishna MD  03/13/23 5541

## 2023-03-13 NOTE — PROGRESS NOTES
Consult has been called to Dr. Hema Rasmussen on 3/13/23.  Spoke with Pilar. 6:01 PM    Caryl Barthel  3/13/2023

## 2023-03-13 NOTE — FLOWSHEET NOTE
03/13/23 1745   Vital Signs   Temp 98 °F (36.7 °C)   Temp Source Oral   Heart Rate 100   Heart Rate Source Monitor   /70   MAP (Calculated) 85   BP Location Left Arm   BP Method Automatic   Patient Position Semi fowlers   Level of Consciousness 0   Oxygen Therapy   SpO2 98 %   O2 Device Nasal cannula   O2 Flow Rate (L/min) 4 L/min   Height and Weight   Height 5' 4\" (1.626 m)   Weight (!) 301 lb 5 oz (136.7 kg)   Weight Method Standing scale   BSA (Calculated - sq m) 2.48 sq meters   BMI (Calculated) 51.8   New admission vitals to to room 304 from Ed with celia pt is caox3 resp even , reporting right arm pain and \" soreness\" across chest . See flowsheet for full assessment call light within deb Ch and bed in low position for pt safety

## 2023-03-13 NOTE — PROGRESS NOTES
4 Eyes Skin Assessment     NAME:  Randell Barrientos  YOB: 1960  MEDICAL RECORD NUMBER:  [id number]    The patient is being assessed for  Admission    I agree that One RN has performed a thorough Head to Toe Skin Assessment on the patient. ALL assessment sites listed below have been assessed. Areas assessed by both nurses:    Head, Face, Ears, Shoulders, Back, Chest, Arms, Elbows, Hands, Sacrum. Buttock, Coccyx, Ischium, and Legs. Feet and Heels        Does the Patient have a Wound?  No noted wound(s)       Maxime Prevention initiated by RN: Yes   Wound Care Orders initiated by RN: Yes    Pressure Injury (Stage 3,4, Unstageable, DTI, NWPT, and Complex wounds) if present, place referral order by RN under : No    New and Established Ostomies, if present place, referral order under : No      Nurse 1 eSignature: Electronically signed by India Valladares RN on 3/13/23 at 6:33 PM EDT    **SHARE this note so that the co-signing nurse can place an eSignature**    Nurse 2 eSignature: Electronically signed by Ankita Goel RN on 3/13/23 at 10:17 PM EDT

## 2023-03-13 NOTE — H&P
Hospital Medicine History & Physical      PCP: ANICETO Gordon - CNP    Date of Admission: 3/13/2023    Date of Service: Pt seen/examined on 03/13/23      Chief Complaint:    Chief Complaint   Patient presents with    Chest Pain     States recent fatigue, CP for a couple days, recent stents. States holding water     Leg Swelling    Fatigue         History Of Present Illness: The patient is a 63 y.o. male with PMH of CABG, CAD s/p recent PCI , chronic total occlusion of coronary artery CHF, Hx of PPM,  Depression, fatigue, HTN, HLD, who presents to [facility] with chest pain, leg swelling and fatigue. History obtained from the patient and review of EMR. Pt stated he has been compliant with medication, fluid restriction, and diet restriction at home. He has been going to cardiac rehab. He stated Thursday he started with weight gain- noted to be up to 315 when his usual weight is 296. He stated he phoned cardiology and they stated continue same regimen and monitor and if gets worse to go to the ED. He stated over the weekend he was down to 302 lbs +BLE edema. He woke up Saturday after napping in the chair and started wit right shoulder pain. Very tender to touch and lift up arm. He denies any injury or fall. He stated he went to cardiac rehab today and noticed increasing right shoulder pain, shortness of breath and chest pain. In ED CTPA was completed showed no PE. Pt follows with [facility] and stated he wanted to stay here for treatment rather than be transferred to [facility] at this time.      Past Medical History:        Diagnosis Date    Acute diverticulitis 04/10/2021    Acute kidney injury ([facility] 04/08/2021    Acute on chronic respiratory failure with hypoxemia (HCC)     Acute respiratory failure ([address] 08/19/2020    Acute respiratory failure with hypoxia ([facility]     Anxiety 01/06/2020    Aortic valve calcification 09/2020    seen on lung CT    CAD in native artery 04/14/2021    Chronic obstructive pulmonary disease ([facility] 09/03/2019    PFT done 9/03/19    Chronic systolic congestive heart failure ([facility] 04/10/2021    Class 3 severe obesity with body mass index (BMI) of 45.0 to 49.9 in adult [facility])     Coronary artery calcification 09/2020    seen on lung ct    Coronary artery disease involving native heart with angina pectoris ([facility] 09/22/2020    Crush injury of right foot 07/23/2015    DDD (degenerative disc disease), lumbar 01/06/2020    Depression 01/06/2020    Diverticulitis of colon 04/10/2021    Erectile dysfunction 01/06/2020    Fatigue 01/06/2020    HNP (herniated nucleus pulposus), lumbar 01/06/2020    Hyperglycemia 01/06/2020    Hyperlipidemia     Hypersomnia 01/06/2020    Hypertension     Insomnia     Ischemic cardiomyopathy     Kidney stone     Lumbar radiculopathy 01/06/2020    Lumbar spine pain 01/06/2020    LVH (left ventricular hypertrophy)     seen on echo 8/2020, moderate    Mobitz type I Wenckebach atrioventricular block 04/10/2021    Moderate protein-calorie malnutrition ([facility] 03/17/2020    Observed sleep apnea 01/06/2020    OANH (obstructive sleep apnea) 01/06/2020    no C-pap is getting tested    Pneumonia, primary atypical     Reactive depression 01/06/2020    S/P three vessel coronary artery bypass 10/26/2020    Spondylosis without myelopathy or radiculopathy, lumbar region 01/06/2020    Tobacco abuse 01/06/2020    Tobacco use 01/06/2020    Wears dentures     full set       Past Surgical History:        Procedure Laterality Date    ANKLE SURGERY Left 08/02/2021    LEFT FOOT SCREW REMOVAL performed by Tanner Siddiqui MD at [facility]  03/2021    CORONARY ARTERY BYPASS GRAFT N/A 09/25/2020    CORONARY ARTERY BYPASS GRAFTING X3, INTERNAL MAMMARY ARTERY, SAPHENOUS VEIN GRAFT, ON PUMP, STERNAL PLATING, 5 LEVEL BILATERAL INTERCOSTAL NERVE BLOCK, PLATELET GEL APPLICATION performed by Guadalupe Anderson MD at [address] [facility] 03/16/2011    cystoscopy left ureteroscopy, left retrograde, double J stent placement    FINGER AMPUTATION Right 02/02/2021    RIGHT MIDDLE FINGER IRRIGATION AND DEBRIDEMENT WITH REVISION AMPUTATION AND BONE SHORTENING, POSSIBLE NAIL ABLATION performed by Heri Darby MD at [facility]      Matrixectomy-NaOH/Phenol    PAIN MANAGEMENT PROCEDURE Right 12/24/2019    RIGHT LUMBAR FIVE SACRAL ONE TRANSFORAMINAL EPIDURAL STEROID INJECTION SITE CONFIRMED BY FLUOROSCOPY performed by Milo Morin MD at [facility] Side.Cr Right 01/07/2020    RIGHT LUMBAR FOUR AND LUMBAR FIVE TRANSFORAMINAL EPIDURAL STEROID INJECTION SITE CONFIRMED BY FLUOROSCOPY performed by Milo Morin MD at [place] [address]       Medications Prior to Admission:    Prior to Admission medications    Medication Sig Start Date End Date Taking? Authorizing Provider   FARXIGA 10 MG tablet Take 1 tablet by mouth daily 2/22/23   Historical Provider, MD   naloxone 4 MG/0.1ML LIQD nasal spray 1 spray by Nasal route as needed 2/6/23   Historical Provider, MD   benzonatate (TESSALON) 200 MG capsule TAKE ONE CAPSULE BY MOUTH THREE TIMES A DAY AS NEEDED FOR COUGH 3/3/23   Steve Proper, APRN - CNP   ondansetron (ZOFRAN) 4 MG tablet TAKE ONE TABLET BY MOUTH DAILY AS NEEDED FOR NAUSEA AND VOMITING 3/3/23   Steve Proper, APRN - CNP   spironolactone (ALDACTONE) 25 MG tablet Take 25 mg by mouth daily 2/9/23   Historical Provider, MD   pregabalin (LYRICA) 150 MG capsule Take 150 mg by mouth 2 times daily.  1/10/23   Historical Provider, MD   brexpiprazole (REXULTI) 1 MG TABS tablet Take 1 mg by mouth daily    Historical Provider, MD   DULoxetine (CYMBALTA) 60 MG extended release capsule Take 60 mg by mouth daily  Patient not taking: Reported on 3/13/2023    Historical Provider, MD   traZODone (DESYREL) 100 MG tablet Take 100 mg by mouth nightly  Patient not taking: Reported on 3/13/2023    Historical Provider, MD   torsemide (DEMADEX) 20 MG tablet Take 40 mg by mouth daily Takes a total of 40mg in AM ( 2 tablets)    Historical Provider, MD   hydrOXYzine pamoate (VISTARIL) 25 MG capsule Take 25 mg by mouth 3 times daily as needed for Itching  Patient not taking: Reported on 3/13/2023    Historical Provider, MD   HYDROcodone-acetaminophen (NORCO)  MG per tablet Take 1 tablet by mouth every 6 hours as needed for Pain.     Historical Provider, MD   metOLazone (ZAROXOLYN) 5 MG tablet Take 1 tablet by mouth once a week 30 minutes before taking Lasix 2/2/23   Darling Cartagena MD   Cholecalciferol (VITAMIN D3) 1.25 MG (46652 UT) CAPS TAKE ONE CAPSULE BY MOUTH ONCE WEEKLY 1/31/23   ANICETO Garner CNP   rOPINIRole (REQUIP) 1 MG tablet Take 1 tablet by mouth nightly  Patient taking differently: Take 1 mg by mouth daily 1/12/23   ANICETO Perez CNP   amitriptyline (ELAVIL) 10 MG tablet Take 1 tablet by mouth nightly 1/12/23   ANICETO Perez CNP   metoprolol succinate (TOPROL XL) 25 MG extended release tablet TAKE ONE TABLET BY MOUTH DAILY 12/27/22   ANICETO Dickson CNP   ranolazine (RANEXA) 500 MG extended release tablet Take 1 tablet by mouth 2 times daily 12/24/22 3/24/23  Wing Luke MD   isosorbide mononitrate (IMDUR) 120 MG extended release tablet Take 1 tablet by mouth daily 12/7/22   Wing Luke MD   fluticasone-umeclidin-vilant (TRELEGY ELLIPTA) 548-33.6-75 MCG/INH AEPB Inhale 1 puff into the lungs daily 10/13/22   David Chowdary MD   albuterol (PROVENTIL) (2.5 MG/3ML) 0.083% nebulizer solution Take 3 mLs by nebulization every 6 hours as needed for Wheezing or Shortness of Breath 10/13/22   David Chowdary MD   albuterol sulfate HFA (PROVENTIL HFA) 108 (90 Base) MCG/ACT inhaler Inhale 2 puffs into the lungs every 4 hours as needed for Wheezing or Shortness of Breath 10/13/22   David Chowdary MD   lansoprazole (PREVACID) 30 MG delayed release capsule TAKE ONE CAPSULE BY MOUTH DAILY  Patient not taking: Reported on 3/13/2023 8/3/22   Rosemarie Reid MD   Omega-3 Fatty Acids (FISH OIL) 1000 MG CAPS Take 2 capsules by mouth 2 times daily 6/24/22   Almita Bush MD   clopidogrel (PLAVIX) 75 MG tablet Take 1 tablet by mouth daily 5/31/22   Phil Soares, DO   furosemide (LASIX) 40 MG tablet Take 1 tablet by mouth as needed (for weight grater than 300lbs)  Patient not taking: Reported on 3/13/2023 5/19/22 5/19/23  Phil Soares, DO   atorvastatin (LIPITOR) 80 MG tablet Take 1 tablet by mouth nightly  Patient taking differently: Take 80 mg by mouth daily 4/13/22   Almita Bush MD   Multiple Vitamins-Minerals (THERAPEUTIC MULTIVITAMIN-MINERALS) tablet Take 1 tablet by mouth daily    Historical Provider, MD   OXYGEN Inhale 4 L into the lungs as needed    Historical Provider, MD   nitroGLYCERIN (NITROSTAT) 0.4 MG SL tablet Place 1 tablet under the tongue every 5 minutes as needed for Chest pain 4/29/21   Almita Bush MD   aspirin 81 MG EC tablet Take 1 tablet by mouth daily 9/30/20   ANICETO Trinidad - CNP       Allergies:  Dilaudid [hydromorphone hcl] and Codeine    Social History:  The patient currently lives home     TOBACCO:   reports that he quit smoking about 3 years ago. His smoking use included cigarettes. He has a 70.00 pack-year smoking history. He has never used smokeless tobacco.  ETOH:   reports no history of alcohol use.       Family History:   Positive as follows:        Problem Relation Age of Onset    Heart Disease Mother     Other Mother         copd    High Blood Pressure Sister     No Known Problems Sister        REVIEW OF SYSTEMS:       Constitutional: Negative for fever +fatigue   HENT: Negative for sore throat   Eyes: Negative for redness   Respiratory: +dyspnea, cough   Cardiovascular: +chest pain, +BLE edema and weight gain   Gastrointestinal: Negative for vomiting, diarrhea   Genitourinary: Negative for hematuria   Musculoskeletal: Right shoulder pain +TTP  Skin: Negative for rash   Neurological: Negative for syncope   Hematological: Negative for adenopathy   Psychiatric/Behavorial: Negative for anxiety    PHYSICAL EXAM:    /70   Pulse 100   Temp 98 °F (36.7 °C) (Oral)   Resp 18   Ht 5' 4\" (1.626 m)   Wt (!) 301 lb 5 oz (136.7 kg)   SpO2 98%   BMI 51.72 kg/m²     Gen: No distress. Alert. Appears older than stated age, obese   Eyes: PERRL. No sclera icterus. No conjunctival injection. ENT: No discharge. Pharynx clear. Neck: No JVD. Trachea midline. Resp: No accessory muscle use. No crackles. No wheezes. No rhonchi. CV: Tachycardia. Regular rhythm. No murmur. No rub. +BLE edema. PPM left chest old healed scar   GI: Non-tender. +distended. No masses. Normal bowel sounds. Skin: Warm and dry. No nodule on exposed extremities. No rash on exposed extremities. M/S: No cyanosis. No joint deformity. No clubbing. ROM limited to right upper extremity, neurovascularly intact. ++pulses warm to touch   Neuro: Awake. Grossly nonfocal    Psych: Oriented x 3. No anxiety or agitation.      CBC:   Recent Labs     03/13/23  1300   WBC 12.8*   HGB 16.7   HCT 50.9   MCV 88.5        BMP:   Recent Labs     03/13/23  1300   *   K 3.8   CL 89*   CO2 29   BUN 19   CREATININE 1.0     LIVER PROFILE:   Recent Labs     03/13/23  1300   AST 29   ALT 41*   BILITOT 1.3*   ALKPHOS 91       CARDIAC ENZYMES  Recent Labs     03/13/23  1300   TROPONINI <0.01       U/A:    Lab Results   Component Value Date/Time    NITRITE NEG 03/16/2011 08:26 AM    COLORU Yellow 04/08/2021 10:10 PM    WBCUA 3-5 03/09/2020 11:17 PM    RBCUA 5-10 03/09/2020 11:17 PM    MUCUS 3+ 03/09/2020 11:17 PM    BACTERIA 1+ 03/09/2020 11:17 PM    CLARITYU Clear 04/08/2021 10:10 PM    SPECGRAV 1.020 04/08/2021 10:10 PM    LEUKOCYTESUR Negative 04/08/2021 10:10 PM    BLOODU Negative 04/08/2021 10:10 PM    GLUCOSEU Negative 04/08/2021 10:10 PM    GLUCOSEU NEGATIVE 03/16/2011 08:20 AM       ABG Lab Results   Component Value Date/Time    SZG2JRM 23.8 09/25/2020 06:27 PM    BEART -2 09/25/2020 06:27 PM    V9KJYVEM 99 09/25/2020 06:27 PM    PHART 7.363 09/25/2020 06:27 PM    EFB8DXU 41.8 09/25/2020 06:27 PM    PO2ART 146.4 09/25/2020 06:27 PM    SUO8BQH 25 09/25/2020 06:27 PM       CULTURES  COVID/Influenza: not detected      EKG:    Sinus tachycardiaPossible Left atrial enlargementRSR' or QR pattern in V1 suggests right ventricular conduction delayLateral infarct (cited on or before 21-APR-2021)Inferior infarct (cited on or before 19-APR-2021)Abnormal ECGWhen compared with ECG of 24-JAN-2023 07:37,No significant change was foundConfirmed by DODIE Marrero MD ([id number]) on 3/13/2023 6:14:57 PM     RADIOLOGY    CT CHEST PULMONARY EMBOLISM W CONTRAST   Preliminary Result   No evidence of pulmonary embolism. No evidence of thoracic aortic aneurysm or dissection. Evidence of previous CABG. Minimal dependent atelectatic changes at the posterior lung bases   bilaterally. No other focal abnormality or acute process in the lungs. No   evidence of pleural effusion or pneumothorax. Evidence of at least moderate   diffuse fatty infiltration in visualized portion of the liver. No subphrenic acute process demonstrated. Echocardiogram 2/3/23  Study Conclusions     - Left ventricle: The cavity size is normal. Left ventricular  geometry shows evidence of concentric remodeling, with normal  ventricular mass and mildly increased relative wall thickness. Systolic function was mildly reduced. The calculated ejection  fraction was 46%. Wall motion was normal; there were no regional  wall motion abnormalities. Doppler parameters are consistent with  abnormal left ventricular relaxation (grade 1 diastolic  dysfunction). The stroke volume is 76ml. The stroke index is  32ml/m^2. - Aortic valve: The peak systolic gradient is 8mm Hg.  - Mitral valve: The annulus is mildly calcified.  The leaflets are  mildly thickened. Principal Problem:    Acute on chronic combined systolic and diastolic CHF (congestive heart failure) ([address]  Resolved Problems:    * No resolved hospital problems. *        ASSESSMENT/PLAN:    Acute on Chronic combined CHF  - Echo completed on 2/3/2023 showed EF 46% with grade 1 DD  - has been following with [facility] and Lasix was recently transitioned to torsemide and aldactone was added as well as Zaroxolyn weekly   - weight on admission 301--monitor dry weight per patient is 296  - BNP normal  - started on Lasix 40 mg BID  - cardiology consulted, pt wanted to stay here at [facility] instead of [facility]  - he does cardiac rehab here  - daily weights, low sodium diet, fluid restriction and strict I/O        CAD  Hx of CABG 9/2020  Chest Pain  Hx of ischemic cardiomyopathy  - troponin <0.01--trend   - no acute EKG changes noted   - D-dimer elevated and CTPA negative for PE  - Hx of CABG on 9/2020. S/p cardiac catheterization on 04/2021 with failure of SVG-om, SVG-diagonal 1, low total percutaneous intervention to left main-LAD and diagonal with drug-eluting stent x2, catheterization from 12/2021 with PCI to diagonal 1 with drug-eluting stent x1 and rotational arthrectomy, shockwave lithotripsy and drug-eluting stent x1 to RCA, persistent chronic total occlusion of the circumflex OM branch. S/p PCI Successful  PCI of the ramus using antegrade wiring and receiving a new 2.5 x 18 mm Xience Skypoint drug-eluting stent at the distal area of occlusion and successful laser atherectomy of InStent restenoses of OM1 with aggressive angioplasty performed but no new stent later added.   - appears patient is on aspirin, Plavix, Toprol, Imdur- nursing to verify  - monitor on telemetry  -cardiology consulted       SSS  S/p PPM on 8/26/2021    Right shoulder pain- appears to be musculoskeletal in nature  - neurovascularly intact  - decreased ROM  -+pulses  - check X-ray  - add Lidocaine patch  - further recommendations pending x-ray     Leukocytosis  - likely reactive, add procal   - no s/sx of infection  - monitor CBC    Hyponatremia  - likely 2/2 fluid overload  - 132  - monitor with diuresis     Metabolic Syndrome   - recently started on Metformin per PCP note  - will hold  - check A1c  - start SSI  - carb control diet   - monitor     HTN  - controlled   - verify home medications     COPD with no AE  - continue home inhalers    Chronic respiratory failure  - wears 4 liters as needed at home     OANH  - on 4 liters of oxygen at night     Depression  - nursing to verify home regimen     Hx of tobacco abuse     Morbid Obesity  - Body mass index is 51.72 kg/m². - Complicating assessment and treatment. Placing patient at risk for multiple co-morbidities as well as early death and contributing to the patient's presentation.   - Counseled on weight loss. Home medications need to be verified, nursing to verify and update on call provider. DVT Prophylaxis: Lovenox   Diet: ADULT DIET; Regular;  No Added Salt (3-4 gm)  Code Status: Full Code     ANICETO Bruno - CNP

## 2023-03-13 NOTE — ED NOTES
1612- Perfect serve sent to Dr. Elie Herrera for admission     1625- Call was returned by Monie Forrester NP and spoke to Dr. Serge Langston  03/13/23 [address] [place] [address]  03/13/23 1625
Alta Currie; [id number]     Thomas Delong RN  03/13/23 [id number]
Eris Whitley RN  03/13/23 [id number]
Report to Marianne Ayala and [address] [place] [address] CAMILO.      Conrad Gilmore RN  03/13/23 [id number]
English

## 2023-03-14 ENCOUNTER — APPOINTMENT (OUTPATIENT)
Dept: CT IMAGING | Age: 63
DRG: 194 | End: 2023-03-14
Payer: COMMERCIAL

## 2023-03-14 PROBLEM — I95.9 HYPOTENSION: Status: ACTIVE | Noted: 2023-03-14

## 2023-03-14 PROBLEM — I50.9 ACUTE ON CHRONIC CONGESTIVE HEART FAILURE (HCC): Status: ACTIVE | Noted: 2023-03-14

## 2023-03-14 LAB
ANION GAP SERPL CALCULATED.3IONS-SCNC: 14 MMOL/L (ref 3–16)
BASOPHILS ABSOLUTE: 0 K/UL (ref 0–0.2)
BASOPHILS RELATIVE PERCENT: 0.4 %
BUN BLDV-MCNC: 21 MG/DL (ref 7–20)
CALCIUM SERPL-MCNC: 9.4 MG/DL (ref 8.3–10.6)
CHLORIDE BLD-SCNC: 88 MMOL/L (ref 99–110)
CHOLEST SERPL-MCNC: 103 MG/DL (ref 0–199)
CO2: 32 MMOL/L (ref 21–32)
CREAT SERPL-MCNC: 1 MG/DL (ref 0.8–1.3)
EOSINOPHILS ABSOLUTE: 0.2 K/UL (ref 0–0.6)
EOSINOPHILS RELATIVE PERCENT: 2 %
EST. AVERAGE GLUCOSE BLD GHB EST-MCNC: 174.3 MG/DL
GFR SERPL CREATININE-BSD FRML MDRD: >60 ML/MIN/{1.73_M2}
GLUCOSE BLD-MCNC: 150 MG/DL (ref 70–99)
GLUCOSE BLD-MCNC: 191 MG/DL (ref 70–99)
GLUCOSE BLD-MCNC: 211 MG/DL (ref 70–99)
GLUCOSE BLD-MCNC: 251 MG/DL (ref 70–99)
GLUCOSE BLD-MCNC: 256 MG/DL (ref 70–99)
GLUCOSE BLD-MCNC: 319 MG/DL (ref 70–99)
HBA1C MFR BLD: 7.7 %
HCT VFR BLD CALC: 50.6 % (ref 40.5–52.5)
HDLC SERPL-MCNC: 42 MG/DL (ref 40–60)
HEMOGLOBIN: 16.9 G/DL (ref 13.5–17.5)
LDLC SERPL CALC-MCNC: 22 MG/DL
LYMPHOCYTES ABSOLUTE: 2.4 K/UL (ref 1–5.1)
LYMPHOCYTES RELATIVE PERCENT: 25.2 %
MAGNESIUM: 2.3 MG/DL (ref 1.8–2.4)
MCH RBC QN AUTO: 29.4 PG (ref 26–34)
MCHC RBC AUTO-ENTMCNC: 33.4 G/DL (ref 31–36)
MCV RBC AUTO: 88.1 FL (ref 80–100)
MONOCYTES ABSOLUTE: 0.9 K/UL (ref 0–1.3)
MONOCYTES RELATIVE PERCENT: 9.7 %
NEUTROPHILS ABSOLUTE: 6.1 K/UL (ref 1.7–7.7)
NEUTROPHILS RELATIVE PERCENT: 62.7 %
PDW BLD-RTO: 16.1 % (ref 12.4–15.4)
PERFORMED ON: ABNORMAL
PLATELET # BLD: 196 K/UL (ref 135–450)
PMV BLD AUTO: 8.7 FL (ref 5–10.5)
POTASSIUM SERPL-SCNC: 3.3 MMOL/L (ref 3.5–5.1)
RBC # BLD: 5.75 M/UL (ref 4.2–5.9)
SODIUM BLD-SCNC: 134 MMOL/L (ref 136–145)
TRIGL SERPL-MCNC: 197 MG/DL (ref 0–150)
VLDLC SERPL CALC-MCNC: 39 MG/DL
WBC # BLD: 9.7 K/UL (ref 4–11)

## 2023-03-14 PROCEDURE — 36415 COLL VENOUS BLD VENIPUNCTURE: CPT

## 2023-03-14 PROCEDURE — 99233 SBSQ HOSP IP/OBS HIGH 50: CPT | Performed by: INTERNAL MEDICINE

## 2023-03-14 PROCEDURE — 83735 ASSAY OF MAGNESIUM: CPT

## 2023-03-14 PROCEDURE — 2580000003 HC RX 258: Performed by: INTERNAL MEDICINE

## 2023-03-14 PROCEDURE — 94761 N-INVAS EAR/PLS OXIMETRY MLT: CPT

## 2023-03-14 PROCEDURE — 6370000000 HC RX 637 (ALT 250 FOR IP): Performed by: NURSE PRACTITIONER

## 2023-03-14 PROCEDURE — 99255 IP/OBS CONSLTJ NEW/EST HI 80: CPT | Performed by: INTERNAL MEDICINE

## 2023-03-14 PROCEDURE — 80061 LIPID PANEL: CPT

## 2023-03-14 PROCEDURE — 2700000000 HC OXYGEN THERAPY PER DAY

## 2023-03-14 PROCEDURE — 70450 CT HEAD/BRAIN W/O DYE: CPT

## 2023-03-14 PROCEDURE — 6360000002 HC RX W HCPCS: Performed by: NURSE PRACTITIONER

## 2023-03-14 PROCEDURE — 6370000000 HC RX 637 (ALT 250 FOR IP): Performed by: INTERNAL MEDICINE

## 2023-03-14 PROCEDURE — 94640 AIRWAY INHALATION TREATMENT: CPT

## 2023-03-14 PROCEDURE — 80048 BASIC METABOLIC PNL TOTAL CA: CPT

## 2023-03-14 PROCEDURE — 85025 COMPLETE CBC W/AUTO DIFF WBC: CPT

## 2023-03-14 PROCEDURE — 2580000003 HC RX 258: Performed by: NURSE PRACTITIONER

## 2023-03-14 PROCEDURE — 2060000000 HC ICU INTERMEDIATE R&B

## 2023-03-14 RX ORDER — 0.9 % SODIUM CHLORIDE 0.9 %
250 INTRAVENOUS SOLUTION INTRAVENOUS ONCE
Status: COMPLETED | OUTPATIENT
Start: 2023-03-14 | End: 2023-03-14

## 2023-03-14 RX ORDER — FUROSEMIDE 40 MG/1
40 TABLET ORAL DAILY
Status: DISCONTINUED | OUTPATIENT
Start: 2023-03-15 | End: 2023-03-17 | Stop reason: HOSPADM

## 2023-03-14 RX ORDER — CALCIUM CARBONATE 200(500)MG
500 TABLET,CHEWABLE ORAL 3 TIMES DAILY PRN
Status: DISCONTINUED | OUTPATIENT
Start: 2023-03-14 | End: 2023-03-17 | Stop reason: HOSPADM

## 2023-03-14 RX ORDER — FUROSEMIDE 10 MG/ML
40 INJECTION INTRAMUSCULAR; INTRAVENOUS DAILY
Status: DISCONTINUED | OUTPATIENT
Start: 2023-03-14 | End: 2023-03-14

## 2023-03-14 RX ADMIN — FUROSEMIDE 40 MG: 10 INJECTION, SOLUTION INTRAMUSCULAR; INTRAVENOUS at 08:34

## 2023-03-14 RX ADMIN — Medication 2 PUFF: at 08:30

## 2023-03-14 RX ADMIN — PREGABALIN 150 MG: 100 CAPSULE ORAL at 23:24

## 2023-03-14 RX ADMIN — ONDANSETRON HYDROCHLORIDE 4 MG: 2 INJECTION, SOLUTION INTRAMUSCULAR; INTRAVENOUS at 17:30

## 2023-03-14 RX ADMIN — ROPINIROLE HYDROCHLORIDE 1 MG: 1 TABLET, FILM COATED ORAL at 08:33

## 2023-03-14 RX ADMIN — SACUBITRIL AND VALSARTAN 1 TABLET: 24; 26 TABLET, FILM COATED ORAL at 09:23

## 2023-03-14 RX ADMIN — Medication 2 PUFF: at 20:33

## 2023-03-14 RX ADMIN — METOPROLOL SUCCINATE 25 MG: 25 TABLET, EXTENDED RELEASE ORAL at 08:34

## 2023-03-14 RX ADMIN — ASPIRIN 81 MG: 81 TABLET, COATED ORAL at 08:34

## 2023-03-14 RX ADMIN — ISOSORBIDE MONONITRATE 120 MG: 60 TABLET, EXTENDED RELEASE ORAL at 08:34

## 2023-03-14 RX ADMIN — ENOXAPARIN SODIUM 30 MG: 100 INJECTION SUBCUTANEOUS at 23:25

## 2023-03-14 RX ADMIN — SPIRONOLACTONE 25 MG: 25 TABLET ORAL at 08:33

## 2023-03-14 RX ADMIN — TIOTROPIUM BROMIDE INHALATION SPRAY 2 PUFF: 3.12 SPRAY, METERED RESPIRATORY (INHALATION) at 08:30

## 2023-03-14 RX ADMIN — SODIUM CHLORIDE 250 ML: 9 INJECTION, SOLUTION INTRAVENOUS at 14:36

## 2023-03-14 RX ADMIN — MULTIPLE VITAMINS W/ MINERALS TAB 1 TABLET: TAB at 08:33

## 2023-03-14 RX ADMIN — Medication 10 ML: at 08:51

## 2023-03-14 RX ADMIN — INSULIN LISPRO 2 UNITS: 100 INJECTION, SOLUTION INTRAVENOUS; SUBCUTANEOUS at 16:09

## 2023-03-14 RX ADMIN — BREXPIPRAZOLE 1 MG: 1 TABLET ORAL at 08:34

## 2023-03-14 RX ADMIN — RANOLAZINE 500 MG: 500 TABLET, FILM COATED, EXTENDED RELEASE ORAL at 23:24

## 2023-03-14 RX ADMIN — Medication 2 PUFF: at 20:32

## 2023-03-14 RX ADMIN — CALCIUM CARBONATE 500 MG: 500 TABLET, CHEWABLE ORAL at 23:24

## 2023-03-14 RX ADMIN — Medication 10 ML: at 23:25

## 2023-03-14 RX ADMIN — CLOPIDOGREL BISULFATE 75 MG: 75 TABLET ORAL at 08:34

## 2023-03-14 RX ADMIN — RANOLAZINE 500 MG: 500 TABLET, FILM COATED, EXTENDED RELEASE ORAL at 08:34

## 2023-03-14 RX ADMIN — EMPAGLIFLOZIN 10 MG: 10 TABLET, FILM COATED ORAL at 10:52

## 2023-03-14 RX ADMIN — INSULIN LISPRO 3 UNITS: 100 INJECTION, SOLUTION INTRAVENOUS; SUBCUTANEOUS at 12:10

## 2023-03-14 RX ADMIN — AMITRIPTYLINE HYDROCHLORIDE 10 MG: 10 TABLET, FILM COATED ORAL at 23:24

## 2023-03-14 RX ADMIN — SODIUM CHLORIDE 250 ML: 9 INJECTION, SOLUTION INTRAVENOUS at 13:13

## 2023-03-14 RX ADMIN — ATORVASTATIN CALCIUM 80 MG: 40 TABLET, FILM COATED ORAL at 23:25

## 2023-03-14 RX ADMIN — PREGABALIN 150 MG: 100 CAPSULE ORAL at 08:33

## 2023-03-14 RX ADMIN — ENOXAPARIN SODIUM 30 MG: 100 INJECTION SUBCUTANEOUS at 08:34

## 2023-03-14 ASSESSMENT — PAIN SCALES - GENERAL
PAINLEVEL_OUTOF10: 4
PAINLEVEL_OUTOF10: 3
PAINLEVEL_OUTOF10: 5

## 2023-03-14 NOTE — ACP (ADVANCE CARE PLANNING)
Advance Care Planning     General Advance Care Planning (ACP) Conversation    Date of Conversation: 3/13/2023  Conducted with: Patient with Decision Making Capacity    Healthcare Decision Maker:    Primary Decision Maker: Yulisa Arzate - Spouse - [phone]  Click here to complete Healthcare Decision Makers including selection of the Healthcare Decision Maker Relationship (ie \"Primary\"). Today we documented Decision Maker(s) consistent with Legal Next of Kin hierarchy.     Content/Action Overview:    Reviewed DNR/DNI and patient elects Full Code (Attempt Resuscitation)    Length of Voluntary ACP Conversation in minutes:  <16 minutes (Non-Billable)    Viviana ROWE, SHYANN  Case Management Department  Phone: [phone] Fax: [phone]

## 2023-03-14 NOTE — FLOWSHEET NOTE
03/14/23 1600   Vital Signs   BP (!) 84/42   MAP (Calculated) 56   BP Location Left Arm   BP Method Manual   Patient Position Semi fowlers   Pain Assessment   Pain Assessment 0-10   Pain Level 3   After 500ml bolus , pt reports he feels better not dizzy .  Dr Stone Drain notified order to continue to monitor

## 2023-03-14 NOTE — CONSULTS
[facility]   HEART FAILURE PROGRAM      NAME:  Favian Sparrow  MEDICAL RECORD NUMBER:  [id number]  AGE: 63 y.o.    GENDER: male  : 1960  TODAY'S DATE:  3/14/2023    Subjective:     VISIT TYPE: Evaluation / Consult     ADMITTING PHYSICIAN: Shahram Adames MD    ADMIT DATE: 3/13/2023    PAST MEDICAL HISTORY:      Diagnosis Date    Acute diverticulitis 04/10/2021    Acute kidney injury ([address] 2021    Acute on chronic respiratory failure with hypoxemia (HCC)     Acute respiratory failure ([address] 2020    Acute respiratory failure with hypoxia (HCC)     Anxiety 2020    Aortic valve calcification 2020    seen on lung CT    CAD in native artery 2021    Chronic obstructive pulmonary disease ([facility] 2019    PFT done 19    Chronic systolic congestive heart failure ([address] 04/10/2021    Class 3 severe obesity with body mass index (BMI) of 45.0 to 49.9 in adult [facility])     Coronary artery calcification 2020    seen on lung ct    Coronary artery disease involving native heart with angina pectoris ([address] 2020    Crush injury of right foot 2015    DDD (degenerative disc disease), lumbar 2020    Depression 2020    Diverticulitis of colon 04/10/2021    Erectile dysfunction 2020    Fatigue 2020    HNP (herniated nucleus pulposus), lumbar 2020    Hyperglycemia 2020    Hyperlipidemia     Hypersomnia 2020    Hypertension     Insomnia     Ischemic cardiomyopathy     Kidney stone     Lumbar radiculopathy 2020    Lumbar spine pain 2020    LVH (left ventricular hypertrophy)     seen on echo 2020, moderate    Mobitz type I Wenckebach atrioventricular block 04/10/2021    Moderate protein-calorie malnutrition ([address] 2020    Observed sleep apnea 2020    OANH (obstructive sleep apnea) 2020    no C-pap is getting tested    Pneumonia, primary atypical     Reactive depression 2020    S/P three vessel coronary artery bypass 10/26/2020    Spondylosis without myelopathy or radiculopathy, lumbar region 01/06/2020    Tobacco abuse 01/06/2020    Tobacco use 01/06/2020    Wears dentures     full set     SCHEDULED MEDICATIONS:   sacubitril-valsartan  1 tablet Oral BID    furosemide  40 mg IntraVENous Daily    sodium chloride flush  5-40 mL IntraVENous 2 times per day    enoxaparin  30 mg SubCUTAneous BID    insulin lispro  0-4 Units SubCUTAneous TID WC    insulin lispro  0-4 Units SubCUTAneous Nightly    lidocaine  1 patch TransDERmal Daily    amitriptyline  10 mg Oral Nightly    aspirin  81 mg Oral Daily    atorvastatin  80 mg Oral Daily    brexpiprazole  1 mg Oral Daily    clopidogrel  75 mg Oral Daily    empagliflozin  10 mg Oral Daily    isosorbide mononitrate  120 mg Oral Daily    metoprolol succinate  25 mg Oral Daily    therapeutic multivitamin-minerals  1 tablet Oral Daily    pregabalin  150 mg Oral BID    ranolazine  500 mg Oral BID    rOPINIRole  1 mg Oral Daily    spironolactone  25 mg Oral Daily    budesonide-formoterol  2 puff Inhalation BID    And    tiotropium  2 puff Inhalation Daily    albuterol sulfate HFA  2 puff Inhalation BID     Objective:     ADMISSION DIAGNOSIS:   Acute on chronic combined systolic and diastolic CHF (congestive heart failure) ([facility]) [I50.43]  Chest pain, unspecified type [R07.9]  Acute on chronic congestive heart failure, unspecified heart failure type ([facility] [I50.9]    WEIGHTS:    Admission weight: (!) 302 lb (137 kg)   Wt Readings from Last 3 Encounters:   03/14/23 296 lb 11.2 oz (134.6 kg)   02/16/23 (!) 301 lb (136.5 kg)   02/08/23 298 lb (135.2 kg)     INTAKE & OUTPUT:   Intake/Output Summary (Last 24 hours) at 3/14/2023 1125  Last data filed at 3/14/2023 1046  Gross per 24 hour   Intake 1140 ml   Output 625 ml   Net 515 ml     ECHOCARDIOGRAM: EF is 46% on 02/2023    Assessment:     Patient resting in bed at this time on  2 L O2.   Pt with complaints of shortness of breath; no complaints of chest pain. Patient stated his baseline oxygen at home is 4/L. Patient stated last week he gained 15-18 lbs from 296 to 315 lbs. Patient was able to lose the weight prior to admission but unable to decrease swelling and improve shortness of breath. Patient called Cardiologist who advised him to come to Hospital. Patient stated he does everything at home with taking medications, daily weights, and watching his salt intake. Lives with spouse at home who is able to help with transportation to appointments. Denied any needs with purschasing medications. Provided the Patient with Heart Failure education on: signs/symptoms to monitor, medications, daily weights, low sodium diet, 2000 ml fluid restriction, and activity. Reinforced the importance of reporting weight gain of 3-5 lbs to Cardiologist sooner than letting it get to 15 lbs. Patient has a working scale at home and does daily weights in the morning. Provided a 32 oz water pitcher for patient to monitor fluid intake at home. Reviewed foods that are high in sodium. Reinforced patient trying to stick to a 2000 mg and under diet. Patient has follow up appointments already scheduled prior to admission with PCP 3/16 at 320 pm Chase Schumacher CNP. Cardiology 3/23 at 9 am with Becki Arredondo CNP. Provided Heart Failure nurse number for any further questions / assistance with resources. CONSULTS:   IP CONSULT TO HOSPITALIST  IP CONSULT TO CARDIOLOGY  IP CONSULT TO HEART FAILURE NURSE/COORDINATOR  IP CONSULT TO DIETITIAN    Patient taking an ACEI/ARB/ARNI: YesVeryl Holes      Patient taking a BETA BLOCKER: Yes- Toprol XL      Patient taking ALDACTONE: Yes- Aldactone      Patient taking Diuretic: No     Patient taking SGLT2: Yes- Jardiance      Patient taking Nitrate: Yes- Imdur    SCALE AVAILABLE: Yes     CURRENT DIET: ADULT DIET;  Regular; 4 carb choices (60 gm/meal); 2000 ml    Education:     EDUCATION STATUS: Patient [place]  [x]  Provided both written and verbal education on Heart Failure signs & symptoms  [x]  Received verbal acknowledgment/understanding of Heart Failure related causes  [x]  Provided instructions on daily medications  [x]  Provided instructions to monitor and record weight daily  [x]  Provided instructions to call if weight increases 3 lbs in one day or 5 lbs in one week  [x]  Provided instructions on how to maintain a low sodium diet  [x]  Provided recommendations on activity and exercise    []  Provided recommendations for smoking cessation programs      EDUCATIONAL MATERIALS PROVIDED:    [x]  [facility] ([facility]): A Patient Education Guide Living with Heart Failure Booklet  [x]  Follow-up Appointment Reminder  [x]  Heart Failure Zones Self-Check Plan  [x]  Weight and Heart Failure Zone Log  [x]  AHA: HF and Your Ejection Fraction Explained  [x]  Sleep Disorders and Your Heart  [x]  AHA: HF Amlin Mae Aids Patients at Home    PATIENT/CAREGIVER TEACHING:   Level of patient/caregiver understanding able to:   [x] Verbalize understanding   [] Demonstrate understanding       [] Teach back        [] Needs reinforcement     []  Other:      TEACHING TIME:  40 minutes       Recommendations:     [x]  Encourage to call Heart Failure [facility] [phone] with any questions or concerns. [x]  Encourage follow-up appointment compliance. Next Appointment: PCP 3/16 at 320 pm Arnold Segal CNP. Cardiology 3/23 at 9 am with Agnes Vanegas CNP. [x]  Emphasize daily weights: Instruct patient to call the MD if weight gain of 3 lbs in 1 day or 5 lbs in a week. [x]  Review sodium restriction diet. Encourage patient to not add table salt and avoid foods high in sodium. [x]  Educate further on fluid restriction of 48 oz - 64 oz with labeled pitcher during inpatient admission. [x]  Continue to educate on signs & symptoms of Heart Failure.   [x]  Other:     Reinforcing Low Sodium Diet & Fluid Restriction       Electronically signed by Mackenzie Stephen, MSN, RN  on 3/14/2023 at 11:25 AM

## 2023-03-14 NOTE — FLOWSHEET NOTE
03/14/23 1308   Vital Signs   BP 84/60   MAP (Calculated) 68   BP Location Left Arm   Dr Joid Alvarez notified 250ml bolus ordered , pt is caox3 resp even , in trendelenburg position , this Rn at [facility] for 15 min recheck

## 2023-03-14 NOTE — CARE COORDINATION
Case Management Assessment  Initial Evaluation    Date/Time of Evaluation: 3/14/2023 9:30 AM  Assessment Completed by: SHYANN Pierre  Case Management Department  Phone: [phone] Fax: [phone]      If patient is discharged prior to next notation, then this note serves as note for discharge by case management. Patient Name: Madhuri Keys                   YOB: 1960  Diagnosis: Acute on chronic combined systolic and diastolic CHF (congestive heart failure) ([facility] [I50.43]  Chest pain, unspecified type [R07.9]  Acute on chronic congestive heart failure, unspecified heart failure type [facility]) [I50.9]                   Date / Time: 3/13/2023 12:46 PM    Patient Admission Status: Inpatient   Readmission Risk (Low < 19, Mod (19-27), High > 27): Readmission Risk Score: 14.6    Current PCP: Candiss Rubinstein, APRN - CNP  PCP verified by CM? Yes    Chart Reviewed: Yes      History Provided by: Patient  Patient Orientation: Alert and Oriented    Patient Cognition: Alert    Hospitalization in the last 30 days (Readmission):  No    If yes, Readmission Assessment in CM Navigator will be completed. Advance Directives:      Code Status: Full Code   Patient's Primary Decision Maker is: Legal Next of Kin    Primary Decision Maker: Amado Arzatea - Spouse - [phone]    Discharge Planning:    Patient lives with: Spouse/Significant Other Type of Home: House  Primary Care Giver: Self  Patient Support Systems include: Spouse/Significant Other, Family Members   Current Financial resources: Medicaid ([facility])  Current community resources: None  Current services prior to admission: Durable Medical Equipment            Current DME: Oxygen Therapy (Comment), Cane, Cpap (Leslye o2 at 4 liters cont)            Type of Home Care services:  None    ADLS  Prior functional level: Independent in ADLs/IADLs  Current functional level:  Independent in ADLs/IADLs    PT AM-PAC:   /24  OT AM-PAC:   /24    Family can provide assistance at DC: Yes  Would you like Case Management to discuss the discharge plan with any other family members/significant others, and if so, who? Yes  Plans to Return to Present Housing: Yes  Other Identified Issues/Barriers to RETURNING to current housing: n/a  Potential Assistance needed at discharge: N/A            Potential DME:    Patient expects to discharge to: [address] [place] [address] for transportation at discharge: Family    Financial    Payor: Noe Jovel / Plan: Cielo Davide / Product Type: *No Product type* /     Does insurance require precert for SNF: Yes    Potential assistance Purchasing Medications: No  Meds-to-Beds request:        [address] [phone] - F [phone]  [address] [facility] [address] [facility]  Phone: [phone] Fax: [phone]    [facility] [phone] - [address] [facility] [phone] [facility] [phone]  [address] [place] [address]  Phone: [phone] Fax: [phone]      Notes:    Factors facilitating achievement of predicted outcomes: Family support, Good insight into deficits, and Has needed Durable Medical Equipment at home    Barriers to discharge: n/a    Additional Case Management Notes: Chart review completed. Met with pt at bedside. Pt stated he is independent with his ADL's and plans on returning home. Discussed skilled home care for RN/PT/OT and pt declined stating he won't need this. He is aware CM will follow. Pt has home o2 at 2 liters at night per Yolanda Power at [facility]; pt will need new testing/orders if pt requires cont o2. CM will follow.      The Plan for Transition of Care is related to the following treatment goals of Acute on chronic combined systolic and diastolic CHF (congestive heart failure) ([facility]) [I50.43]  Chest pain, unspecified type [R07.9]  Acute on chronic congestive heart failure, unspecified heart failure type ([facility] [I50.9]    Raejean Back [address] Tamara Boone MSW, ALAINA-S  Case Management Department  Phone: [phone] Fax: [phone]    Addendum at 3:22pm: Spoke with RN who states pt won't leave today and is aware pt has home o2 at night so will need cont orders

## 2023-03-14 NOTE — PROGRESS NOTES
Pt is lying in bed with their eyes closed. Respirations are easy and even. Call light within reach bed in lowest position with the wheels locked. Will continue to monitor.  John Sood RN

## 2023-03-14 NOTE — CONSULTS
[facility]  [phone]        Reason for Consultation/Chief Complaint: \"I have been having should pain, CP, and fatigue. \"  Consulted for CP and CHF    History of Present Illness:    Collin Butcher is a 63 y.o. patient who presented to [facility] 3/13/23 with c/o fatigue, shoulder pain, CP. He sees Dr. Moe Figueroa and Dr. Alyssia Son and Diana Cruz was 1/5/23. Referred to [facility]. He has PMCH chronic sCHF, ICM, CAD s/p 3V CABG on 9/25/20, OANH non compliant CPAP, Mobitz 1 with 2:1. Dual chamber PPM implanted 8/26/21. Katherin 12/22/22 abnormal CCx territory. [facility] 1/24/23 Severe circumflex and ramus restenosis. Transferred to [facility] for evaluation of brachytherapy. Most recent [facility] 1/25/23 Successful  PCI ramus with ISREAL. . Successful laser atherectomy of ISR of OM1 with aggressive angioplasty performed but no stent. Most recent device check 1/24/23 15 yr 5 mo MARKO. Normal pacing function. No arrhythmias recorded.  2.2% (MVP On) AP 0.4% Most recent echo 2/3/23 LVEF=46%, mild cLVH, grade 1 DD, MV thickened. .   Mr. Joel Iniguez now presents with complaints of CP, shoulder pain, and fatigue. He c/o lack of energy mainly with bilateral shoulder and upper chest pain hard to describe. He also reports depression and \"being at wit's end\" with health problem. Admitting EKG  bpm Possible LAE; RSR'; Lateral infarct; Inferior infarct (no change 1/23). Admitting CT Chest showed no pulmonary embolism. Pro-BNP 80. Mary <0.01 x3 ; Na 132, 134; Patient with no complaints of chest pain, SOB, palpitations, dizziness, edema, or orthopnea/PND. I have been asked to provide consultation regarding further management and testing.       Past Medical History:   has a past medical history of Acute diverticulitis, Acute kidney injury ([address] Acute on chronic respiratory failure with hypoxemia ([address] Acute respiratory failure ([address] Acute respiratory failure with hypoxia ([address] Anxiety, Aortic valve calcification, CAD in native artery, Chronic obstructive pulmonary disease (HCC), Chronic systolic congestive heart failure (HCC), Class 3 severe obesity with body mass index (BMI) of 45.0 to 49.9 in adult (HCC), Coronary artery calcification, Coronary artery disease involving native heart with angina pectoris (HCC), Crush injury of right foot, DDD (degenerative disc disease), lumbar, Depression, Diverticulitis of colon, Erectile dysfunction, Fatigue, HNP (herniated nucleus pulposus), lumbar, Hyperglycemia, Hyperlipidemia, Hypersomnia, Hypertension, Insomnia, Ischemic cardiomyopathy, Kidney stone, Lumbar radiculopathy, Lumbar spine pain, LVH (left ventricular hypertrophy), Mobitz type I Wenckebach atrioventricular block, Moderate protein-calorie malnutrition (HCC), Observed sleep apnea, OANH (obstructive sleep apnea), Pneumonia, primary atypical, Reactive depression, S/P three vessel coronary artery bypass, Spondylosis without myelopathy or radiculopathy, lumbar region, Tobacco abuse, Tobacco use, and Wears dentures.    Surgical History:   has a past surgical history that includes Cholecystectomy; Cystoscopy (03/16/2011); Pain management procedure (Right, 12/24/2019); Pain management procedure (Right, 01/07/2020); Coronary artery bypass graft (N/A, 09/25/2020); Finger amputation (Right, 02/02/2021); Coronary angioplasty with stent (03/2021); Ankle surgery (Left, 08/02/2021); and other surgical history.     Social History:   reports that he quit smoking about 3 years ago. His smoking use included cigarettes. He has a 70.00 pack-year smoking history. He has never used smokeless tobacco. He reports that he does not currently use drugs after having used the following drugs: Marijuana (Weed) and Cocaine. He reports that he does not drink alcohol.     Family History:  family history includes Heart Disease in his mother; High Blood Pressure in his sister; No Known Problems in his sister; Other in his mother.     Home Medications:  Were reviewed and are listed  in nursing record. and/or listed below  Prior to Admission medications    Medication Sig Start Date End Date Taking? Authorizing Provider   FARXIGA 10 MG tablet Take 1 tablet by mouth daily 2/22/23   Historical Provider, MD   naloxone 4 MG/0.1ML LIQD nasal spray 1 spray by Nasal route as needed 2/6/23   Historical Provider, MD   benzonatate (TESSALON) 200 MG capsule TAKE ONE CAPSULE BY MOUTH THREE TIMES A DAY AS NEEDED FOR COUGH 3/3/23   ANICETO Wang CNP   ondansetron (ZOFRAN) 4 MG tablet TAKE ONE TABLET BY MOUTH DAILY AS NEEDED FOR NAUSEA AND VOMITING 3/3/23   ANICETO Wang CNP   spironolactone (ALDACTONE) 25 MG tablet Take 25 mg by mouth daily 2/9/23   Historical Provider, MD   pregabalin (LYRICA) 150 MG capsule Take 150 mg by mouth 2 times daily. 1/10/23   Historical Provider, MD   brexpiprazole (REXULTI) 1 MG TABS tablet Take 1 mg by mouth daily    Historical Provider, MD   traZODone (DESYREL) 100 MG tablet Take 100 mg by mouth nightly  Patient not taking: Reported on 3/13/2023    Historical Provider, MD   torsemide (DEMADEX) 20 MG tablet Take 40 mg by mouth daily Takes a total of 40mg in AM ( 2 tablets)    Historical Provider, MD   hydrOXYzine pamoate (VISTARIL) 25 MG capsule Take 25 mg by mouth 3 times daily as needed for Itching  Patient not taking: Reported on 3/13/2023    Historical Provider, MD   HYDROcodone-acetaminophen (NORCO)  MG per tablet Take 1 tablet by mouth every 6 hours as needed for Pain.     Historical Provider, MD   metOLazone (ZAROXOLYN) 5 MG tablet Take 1 tablet by mouth once a week 30 minutes before taking Lasix 2/2/23   Kaylene Gardner MD   Cholecalciferol (VITAMIN D3) 1.25 MG (91116 UT) CAPS TAKE ONE CAPSULE BY MOUTH ONCE WEEKLY 1/31/23   ANICETO Wallace CNP   rOPINIRole (REQUIP) 1 MG tablet Take 1 tablet by mouth nightly  Patient taking differently: Take 1 mg by mouth daily 1/12/23   ANICETO Wang CNP   amitriptyline (ELAVIL) 10 MG tablet Take 1 tablet by mouth nightly 1/12/23   ANICETO Belle CNP   metoprolol succinate (TOPROL XL) 25 MG extended release tablet TAKE ONE TABLET BY MOUTH DAILY 12/27/22   ANICETO Dickson CNP   ranolazine (RANEXA) 500 MG extended release tablet Take 1 tablet by mouth 2 times daily 12/24/22 3/24/23  Almita Bush MD   isosorbide mononitrate (IMDUR) 120 MG extended release tablet Take 1 tablet by mouth daily 12/7/22   Almita Bush MD   fluticasone-umeclidin-vilant (TRELEGY ELLIPTA) 890-16.2-04 MCG/INH AEPB Inhale 1 puff into the lungs daily 10/13/22   Vero Torres MD   albuterol (PROVENTIL) (2.5 MG/3ML) 0.083% nebulizer solution Take 3 mLs by nebulization every 6 hours as needed for Wheezing or Shortness of Breath 10/13/22   Vero Torres MD   albuterol sulfate HFA (PROVENTIL HFA) 108 (90 Base) MCG/ACT inhaler Inhale 2 puffs into the lungs every 4 hours as needed for Wheezing or Shortness of Breath 10/13/22   Vero Torres MD   Omega-3 Fatty Acids (FISH OIL) 1000 MG CAPS Take 2 capsules by mouth 2 times daily 6/24/22   Almita Bush MD   clopidogrel (PLAVIX) 75 MG tablet Take 1 tablet by mouth daily 5/31/22   Phil Soares DO   furosemide (LASIX) 40 MG tablet Take 1 tablet by mouth as needed (for weight grater than 300lbs)  Patient not taking: Reported on 3/13/2023 5/19/22 5/19/23  Phil Soares DO   atorvastatin (LIPITOR) 80 MG tablet Take 1 tablet by mouth nightly  Patient taking differently: Take 80 mg by mouth daily 4/13/22   Almita Bush MD   Multiple Vitamins-Minerals (THERAPEUTIC MULTIVITAMIN-MINERALS) tablet Take 1 tablet by mouth daily    Historical Provider, MD   OXYGEN Inhale 4 L into the lungs as needed    Historical Provider, MD   nitroGLYCERIN (NITROSTAT) 0.4 MG SL tablet Place 1 tablet under the tongue every 5 minutes as needed for Chest pain 4/29/21   Almita Bush MD   aspirin 81 MG EC tablet Take 1 tablet by mouth daily 9/30/20   ANICETO Trinidad - CNP     Allergies:  Dilaudid [hydromorphone hcl] and Codeine     Review of Systems:   12 point ROS negative in all areas as listed below except as in [place]  Constitutional, EENT, Cardiovascular, pulmonary, GI, , Musculoskeletal, skin, neurological, hematological, endocrine, Psychiatric    Physical Examination:    Vitals:    03/14/23 0711   BP: 133/82   Pulse: (!) 101   Resp: 17   Temp: 97.2 °F (36.2 °C)   SpO2: 100%    Weight: 296 lb 11.2 oz (134.6 kg)         General Appearance:  Alert, cooperative, no distress, appears stated age   Head:  Normocephalic, without obvious abnormality, atraumatic   Eyes:  PERRL, conjunctiva/corneas clear       Nose: Nares normal, no drainage or sinus tenderness   Throat: Lips, mucosa, and tongue normal   Neck: Supple, symmetrical, trachea midline, no adenopathy, thyroid: not enlarged, symmetric, no tenderness/mass/nodules, no carotid bruit or JVD       Lungs:   Clear to auscultation bilaterally, respirations unlabored   Chest Wall:  +tenderness reproducing pain upper chest and shoulders   Heart:  Regular rate and rhythm, S1, S2 normal, no murmur, rub or gallop   Abdomen:   Soft, non-tender, bowel sounds active all four quadrants,  no masses, no organomegaly           Extremities: Extremities normal, atraumatic, no cyanosis or edema   Pulses: 2+ and symmetric   Skin: Skin color, texture, turgor normal, no rashes or lesions   Pysch: Normal mood and affect   Neurologic: Normal gross motor and sensory exam.         Labs  CBC:   Lab Results   Component Value Date/Time    WBC 9.7 03/14/2023 04:30 AM    RBC 5.75 03/14/2023 04:30 AM    HGB 16.9 03/14/2023 04:30 AM    HCT 50.6 03/14/2023 04:30 AM    MCV 88.1 03/14/2023 04:30 AM    RDW 16.1 03/14/2023 04:30 AM     03/14/2023 04:30 AM     CMP:    Lab Results   Component Value Date/Time     03/14/2023 04:30 AM    K 3.3 03/14/2023 04:30 AM    K 3.8 03/13/2023 01:00 PM    CL 88 03/14/2023 04:30 AM    CO2 32 03/14/2023 04:30 AM    BUN 21  03/14/2023 04:30 AM    CREATININE 1.0 03/14/2023 04:30 AM    GFRAA >60 09/07/2022 01:55 PM    GFRAA >60 03/16/2011 11:15 AM    AGRATIO 1.3 03/13/2023 01:00 PM    LABGLOM >60 03/14/2023 04:30 AM    GLUCOSE 150 03/14/2023 04:30 AM    PROT 7.4 03/13/2023 01:00 PM    PROT 6.9 03/16/2011 11:15 AM    CALCIUM 9.4 03/14/2023 04:30 AM    BILITOT 1.3 03/13/2023 01:00 PM    ALKPHOS 91 03/13/2023 01:00 PM    AST 29 03/13/2023 01:00 PM    ALT 41 03/13/2023 01:00 PM     PT/INR:  No results found for: PTINR  Lab Results   Component Value Date    TROPONINI <0.01 03/13/2023       EKG:  I have reviewed EKG with the following interpretation:  Impression:  See HPI    Assessment: Jeri Phillips is a 63 y.o. patient who presented to [facility] 3/13/23 with c/o fatigue, shoulder pain, LEE, and CP. He sees Dr. Bong Downs and Dr. Татьяна Zhang and Renetta Littlejohn was 1/5/23. Referred to [facility]. He has PMCH chronic sCHF, ICM, CAD s/p 3V CABG on 9/25/20, OANH non compliant CPAP, Mobitz 1 with 2:1. Dual chamber PPM implanted 8/26/21. Katherin 12/22/22 abnormal CCx territory. [facility] 1/24/23 Severe circumflex and ramus restenosis. Transferred to [place] for evaluation of brachytherapy. Most recent [facility] 1/25/23 Successful  PCI ramus with ISREAL. . Successful laser atherectomy of ISR of OM1 with aggressive angioplasty performed but no stent. Most recent device check 1/24/23 15 yr 5 mo MARKO. Normal pacing function. No arrhythmias recorded.  2.2% (MVP On) AP 0.4% Most recent echo 2/3/23 LVEF=46%, mild cLVH, grade 1 DD, MV thickened. .   Mr. Eunice Kail now presents with complaints of CP, shoulder pain, and fatigue. He c/o lack of energy mainly with bilateral shoulder and upper chest pain hard to describe. He also reports depression and \"being at wit's end\" with health problem. Admitting EKG  bpm Possible LAE; RSR'; Lateral infarct; Inferior infarct (no change 1/23). Admitting CT Chest showed no pulmonary embolism. Pro-BNP 80. Mary <0.01 x3 ; Na 132, 134.    Diagnosis of unspecified CP, shoulder pain, LEE, and fatigue in older male with extensive CAD and ischemic CM history. Concern acute on chronic combined diastolic/systolic CHF. Recs:  Low suspicion clinically for ACS. Pain reproducible and likely musculoskeletal upper chest/shoulder regions. Ruled out for MI and EKG unchanged. BNP low but LEE may be due to combined diastolic/syst CHF and/or underlying severe CAD. No need for cardiac testing at this time. He sees interventional Dr. Sanford Dubose at [facility] early April for opinion regarding possible future PCI. Would treat with NSAIDs or pain med for pain now. Defer to IM team.  Reports depression and fatigue, lack of energy. Mental strain may be contributing and would consider starting meds and/or psych eval. Defer to IM team.  Continue current med regimen. He is not on ARNI or ACE-I and not certain why? Will start Entresto to see if helps LEE. Change to IV lasix 40mg daily. Note acute on chronic CHF, CP in patient with extensive cardiac history making high risk for morbidity and mortality requiring treatment.     Patient Active Problem List   Diagnosis    Crush injury of right foot    Shortness of breath    Other chest pain    Erectile dysfunction    Hyperglycemia    Anxiety    Depression    History of alcohol abuse    Fatigue    OANH (obstructive sleep apnea)    Hypersomnia    Chronic obstructive pulmonary disease (HCC)    Lumbar radiculopathy    HNP (herniated nucleus pulposus), lumbar    Spondylosis without myelopathy or radiculopathy, lumbar region    DDD (degenerative disc disease), lumbar    Lumbar spine pain    Acute respiratory distress    Class 3 severe obesity with body mass index (BMI) of 45.0 to 49.9 in adult [facility])    Pneumonia, primary atypical    Acute respiratory failure with hypoxia (HCC)    Moderate protein-calorie malnutrition ([address]    Acute respiratory failure ([address]    Acute on chronic respiratory failure with hypoxemia (HCC)    Acute diastolic congestive heart failure ([facility]    Hyperlipidemia    Insomnia    Morbid obesity with BMI of 45.0-49.9, adult ([facility])    Abnormal cardiovascular stress test    Coronary artery disease involving native heart    S/P three vessel coronary artery bypass    Acute kidney injury ([facility]    Chronic combined systolic and diastolic congestive heart failure ([facility])    Mobitz type I Wenckebach atrioventricular block    Asystole ([facility]    Ischemic cardiomyopathy    Diverticulitis of colon    Coronary artery disease involving native coronary artery of native heart with unstable angina pectoris ([facility])    CHB (complete heart block) ([facility])    Sinus bradycardia    Cellulitis    Acute deep vein thrombosis (DVT) of left upper extremity ([facility])    Cellulitis of chest wall    Painful orthopaedic hardware ([facility])    SSS (sick sinus syndrome) ([facility])    Observed sleep apnea    ASHD (arteriosclerotic heart disease)    Pacemaker    Chronic total occlusion of coronary artery    Ingrowing nail    Tinea unguium    Pain in left toe(s)    Other hammer toe(s) (acquired), left foot    CAD in native artery    Acute on chronic combined systolic and diastolic CHF (congestive heart failure) (HCC)    Acute pain of right shoulder    Chest pain         Thank you for allowing to us to participate in the care or [employer]. Further evaluation will be based upon the patient's clinical course and testing results.

## 2023-03-14 NOTE — FLOWSHEET NOTE
03/14/23 1245   Vital Signs   BP (!) 80/46   MAP (Calculated) 57   BP Location Left upper arm   Pt called this RN to room reporting dizziness during bowel movement , reports large BM BP as above after returning to bed . Pt placed in trendelenburg position , is caox3 , resp even , family at [facility] .  Will notify physician of episode and concerns , re check 71/42

## 2023-03-14 NOTE — DISCHARGE INSTRUCTIONS
Heart Failure Resources:    Heart Failure Interactive Workbook:   Go to www.[employer]Tell[employer].com/aha-heartfailure for a Free Heart Failure Interactive Workbook provided by [employer]. This interactive workbook will provide information on Healthier Living with Heart Failure. Please copy and paste link into search bar. Use your mouse to scroll through the pages. HF [place] asaf:   Heart Failure Free smart phone saaf available for iPhone and Android download. Use your phone to track sodium intake, fluid intake, symptoms, and weight. Low Sodium Diet:  Go to www. [employer]. org website for [employer] which is Low Sodium! [employer] is a [employer], but this website offers free seasonal cookbooks. Each quarter, they will release 25-30 new recipes with a breakdown of calories, sodium, and glucose. Recipes:   Go to www.[employer]/recipes website for free recipes. Home Exercise Program:     -Identification of Green/yellow/Red zones. You should be able to identify when you feel good (green zone), if you have 1-2 symptoms of HF (yellow zone), or if you are in need of medical attention (red zone). In your CHF education folder you were provided a stop light tool to outline this information.     -We want to you to rate your exertion levels. Our therapy team has discussed means of identification with you such as the \"Kelly scale. \"  The Kelly rating scale ranges from 6 to 20, where 6 means \"no exertion at all\" and 20 means \"maximal exertion. \" The goal is to use this to gauge how much effort it is taking for you to do your normal daily tasks. You should be able to recognize when too much exertion is being expended.    -Elements of Energy Conservation  Prioritize/Plan: Decide what needs to be done today, and what can wait for a later date, write to do lists, Plan ahead to avoid extra trips, Gather supplies and equipment needed before starting an activity.    Position: Avoid tiring and awkward posture that may impair breathing  Pace: Slow and steady pace, never rushing! Pursed lip breathing.   Pursed Lip Breathing: \"Smell the roses, blow out the candles\"

## 2023-03-14 NOTE — PROGRESS NOTES
IM Progress Note    Admit Date:  3/13/2023    Presented for chest pain, leg swelling, fatigue and weight gain  Admitted for further evaluation and management of acute CHF exacerbation and chest pain rule out. Cardiology consulted    Subjective:  Mr. Brian Dias became dizzy and diaphoretic after he had a BM. BP low- pt placed back on [place] . Started on fluid bolus . Pt got IV lasix , entresto, imdur this AM     Objective:   Patient Vitals for the past 4 hrs:   BP   03/14/23 1315 (!) 89/47   03/14/23 1308 84/60   03/14/23 1245 (!) 80/46     Blood pressure was down to the 70s, with 250 mL fluid bolus systolic blood pressure up to 89/50 now. , RR 16, afebrile     Intake/Output Summary (Last 24 hours) at 3/14/2023 1224  Last data filed at 3/14/2023 1046  Gross per 24 hour   Intake 1140 ml   Output 625 ml   Net 515 ml       Physical Exam:  Gen: ,Patient appears ill , he is diaphoretic , complains of feeling dizzy . Quirino Parisian Mild distress. Alert. Appears older than stated age, obese   Eyes: PERRL. No sclera icterus. No conjunctival injection. ENT: No discharge. Pharynx clear. Neck: No JVD. Trachea midline. Resp: No accessory muscle use. No crackles. No wheezes. No rhonchi. CV: Tachycardia. Regular rhythm. No murmur. No rub. +BLE edema. PPM left chest old healed scar   GI: Non-tender. +distended. No masses. Normal bowel sounds. Skin: Warm and dry. No nodule on exposed extremities. No rash on exposed extremities. M/S: No cyanosis. No joint deformity. No clubbing. ROM limited to right upper extremity, neurovascularly intact. ++pulses warm to touch   Neuro: Awake. Grossly nonfocal    Psych: Oriented x 3. No anxiety or agitation.        Scheduled Meds:   sacubitril-valsartan  1 tablet Oral BID    furosemide  40 mg IntraVENous Daily    sodium chloride flush  5-40 mL IntraVENous 2 times per day    enoxaparin  30 mg SubCUTAneous BID    insulin lispro  0-4 Units SubCUTAneous TID     insulin lispro 0-4 Units SubCUTAneous Nightly    lidocaine  1 patch TransDERmal Daily    amitriptyline  10 mg Oral Nightly    aspirin  81 mg Oral Daily    atorvastatin  80 mg Oral Daily    brexpiprazole  1 mg Oral Daily    clopidogrel  75 mg Oral Daily    empagliflozin  10 mg Oral Daily    isosorbide mononitrate  120 mg Oral Daily    metoprolol succinate  25 mg Oral Daily    therapeutic multivitamin-minerals  1 tablet Oral Daily    pregabalin  150 mg Oral BID    ranolazine  500 mg Oral BID    rOPINIRole  1 mg Oral Daily    spironolactone  25 mg Oral Daily    budesonide-formoterol  2 puff Inhalation BID    And    tiotropium  2 puff Inhalation Daily    albuterol sulfate HFA  2 puff Inhalation BID       Continuous Infusions:   sodium chloride      dextrose         PRN Meds:  sodium chloride flush, sodium chloride, ondansetron **OR** ondansetron, polyethylene glycol, acetaminophen **OR** acetaminophen, potassium chloride **OR** potassium alternative oral replacement **OR** potassium chloride, magnesium sulfate, glucose, dextrose bolus **OR** dextrose bolus, glucagon (rDNA), dextrose, benzonatate, HYDROcodone 5 mg - acetaminophen, albuterol      Data:  CBC:   Recent Labs     03/13/23  1300 03/14/23  0430   WBC 12.8* 9.7   HGB 16.7 16.9   HCT 50.9 50.6   MCV 88.5 88.1    196     BMP:   Recent Labs     03/13/23  1300 03/14/23  0430   * 134*   K 3.8 3.3*   CL 89* 88*   CO2 29 32   BUN 19 21*   CREATININE 1.0 1.0     LIVER PROFILE:   Recent Labs     03/13/23  1300   AST 29   ALT 41*   BILITOT 1.3*   ALKPHOS 91     PT/INR: No results for input(s): PROTIME, INR in the last 72 hours. CULTURES  Covid/influenza not detected        RADIOLOGY  XR SHOULDER RIGHT (MIN 2 VIEWS)   Final Result   No acute abnormality. CT CHEST PULMONARY EMBOLISM W CONTRAST   Final Result   No evidence of pulmonary embolism. No evidence of thoracic aortic aneurysm or dissection. Evidence of previous CABG.       Minimal dependent atelectatic changes at the posterior lung bases   bilaterally. No other focal abnormality or acute process in the lungs. No   evidence of pleural effusion or pneumothorax. Evidence of at least moderate   diffuse fatty infiltration in visualized portion of the liver. No subphrenic acute process demonstrated. Assessment/Plan:      Acute on Chronic combined CHF  - echo completed on 2/3/2023 showed EF 46% with grade 1 DD  - has been following with [facility] and Lasix was recently transitioned to torsemide and aldactone was added as well as Zaroxolyn weekly   - weight on admission 301--monitor;  dry weight per patient is 296  - BNP normal  - started on IV Lasix 40 mg BID-> switch to Lasix 40 IV once daily  - cardiology consulted, pt wanted to stay here at [facility] instead of being transferred to [place] [id number]  - he does cardiac rehab here  - daily weights, low sodium diet, fluid restriction and strict I/O  -Seen by cardiology, Entresto added. Continue Imdur. Hypotension  Dizziness  H/O HTN- BP low now   -250 mL saline bolus given, and repeated. Systolic blood pressures improved to the 80s. -Hold Entresto dose to the evening. Switch Lasix dose in the morning to p.o. Monitor blood pressures closely        CAD  Hx of CABG 9/2020  Chest Pain  Hx of ischemic cardiomyopathy  - serial troponins flat   - no acute EKG changes noted   - d-dimer elevated and CTPA negative for PE  - hx of CABG on 9/2020.  S/p cardiac catheterization on 04/2021   - appears patient is on aspirin, Plavix, Toprol, Imdur  - monitor on telemetry  - cardiology consulted - r/o MI and low suspicion for ACS  - started on entresto     SSS  S/p PPM on 8/26/2021     Right shoulder pain- appears to be musculoskeletal in nature  - neurovascularly intact  - decreased ROM  -+pulses  - X-ray non-acute   - add Lidocaine patch     Leukocytosis  - likely reactive, procal 0.22   - no s/sx of infection  - monitor CBC     Hyponatremia  - likely 2/2 fluid overload  - 132 --> 134  - monitor with diuresis      Metabolic Syndrome   - recently started on Metformin per PCP note  - will hold  - A1c 7.7%  - start SSI  - carb control diet   - monitor      COPD with no AE  - continue home inhalers     Chronic respiratory failure  - wears 4 liters as needed at home      OANH  - on 4 liters of oxygen at night      Depression  - does not appear to be on home med, may need to start med; consider psych eval .    Patient currently hypotensive and diaphoretic-we will treat this first.     Hx of tobacco abuse      Morbid Obesity  - body mass index is 51.72 kg/m². - complicating assessment and treatment. Placing patient at risk for multiple co-morbidities as well as early death and contributing to the patient's presentation. - counseled on weight loss. DVT Prophylaxis: Lovenox  Diet: ADULT DIET; Regular; 4 carb choices (60 gm/meal); 2000 ml  Code Status: Full Code        Addendum  645pm  Episode of nausea and vomiting and dizziness again.   Check CT head    --> report normal     Monitor blood pressures closely; treat nausea and vomiting symptomatically with Zofran as needed     Noemí Crocker MD   3/14/2023

## 2023-03-14 NOTE — PLAN OF CARE
HEART FAILURE CARE PLAN:    Comorbidities Reviewed: Yes   Patient has a past medical history of Acute diverticulitis, Acute kidney injury ([address] Acute on chronic respiratory failure with hypoxemia ([address] Acute respiratory failure ([address] Acute respiratory failure with hypoxia ([address] Anxiety, Aortic valve calcification, CAD in native artery, Chronic obstructive pulmonary disease (HCC), Chronic systolic congestive heart failure (HCC), Class 3 severe obesity with body mass index (BMI) of 45.0 to 49.9 in [facility]), Coronary artery calcification, Coronary artery disease involving native heart with angina pectoris ([address] Crush injury of right foot, DDD (degenerative disc disease), lumbar, Depression, Diverticulitis of colon, Erectile dysfunction, Fatigue, HNP (herniated nucleus pulposus), lumbar, Hyperglycemia, Hyperlipidemia, Hypersomnia, Hypertension, Insomnia, Ischemic cardiomyopathy, Kidney stone, Lumbar radiculopathy, Lumbar spine pain, LVH (left ventricular hypertrophy), Mobitz type I Wenckebach atrioventricular block, Moderate protein-calorie malnutrition ([address] Observed sleep apnea, OANH (obstructive sleep apnea), Pneumonia, primary atypical, Reactive depression, S/P three vessel coronary artery bypass, Spondylosis without myelopathy or radiculopathy, lumbar region, Tobacco abuse, Tobacco use, and Wears dentures. ECHOCARDIOGRAM Reviewed: Yes   Patient's Ejection Fraction (EF) is greater than 40%    Weights Reviewed:  Yes   Admission weight: (!) 302 lb (137 kg)   Wt Readings from Last 3 Encounters:   03/14/23 296 lb 11.2 oz (134.6 kg)   02/16/23 (!) 301 lb (136.5 kg)   02/08/23 298 lb (135.2 kg)     Intake & Output Reviewed: Yes     Intake/Output Summary (Last 24 hours) at 3/14/2023 0806  Last data filed at 3/14/2023 0355  Gross per 24 hour   Intake 600 ml   Output --   Net 600 ml     Medications Reviewed: Yes   SCHEDULED HOSPITAL MEDICATIONS:   sodium chloride flush  5-40 mL IntraVENous 2 times per day enoxaparin  30 mg SubCUTAneous BID    furosemide  40 mg IntraVENous BID    insulin lispro  0-4 Units SubCUTAneous TID WC    insulin lispro  0-4 Units SubCUTAneous Nightly    lidocaine  1 patch TransDERmal Daily    amitriptyline  10 mg Oral Nightly    aspirin  81 mg Oral Daily    atorvastatin  80 mg Oral Daily    brexpiprazole  1 mg Oral Daily    clopidogrel  75 mg Oral Daily    empagliflozin  10 mg Oral Daily    isosorbide mononitrate  120 mg Oral Daily    metoprolol succinate  25 mg Oral Daily    therapeutic multivitamin-minerals  1 tablet Oral Daily    pregabalin  150 mg Oral BID    ranolazine  500 mg Oral BID    rOPINIRole  1 mg Oral Daily    spironolactone  25 mg Oral Daily    budesonide-formoterol  2 puff Inhalation BID    And    tiotropium  2 puff Inhalation Daily    albuterol sulfate HFA  2 puff Inhalation BID     ACE/ARB/ARNI is REQUIRED for EF </= 01% SYSTOLIC FAILURE:   ACE[de-identified] Elavil  ARB[de-identified] None  ARNI[de-identified] Entresto    Evidenced-Based Beta Blocker is REQUIRED for EF </= 92% SYSTOLIC FAILURE:   [de-identified] Metoprolol SUCCinate- Toprol XL    Diuretics: Spirodactone  [de-identified] Lasix      Diet Reviewed: Yes   Diet NPO Exceptions are: Ice Chips, Sips of Water with Meds    Goal of Care Reviewed: Yes   Patient and/or Family's stated Goal of Care this Admission: reduce shortness of breath, increase activity tolerance, better understand heart failure and disease management, be more comfortable, reduce lower extremity edema, and unable to assess, will attempt again prior to discharge.

## 2023-03-14 NOTE — FLOWSHEET NOTE
03/14/23 1830   Vital Signs   BP (!) 84/42   MAP (Calculated) 56   BP Location Left Arm   Patient Position Semi fowlers   Pain Assessment   Pain Assessment 0-10   Pain Level 4   Pt actively vomiting, c/o nausea , headache and dizziness , caox3 at this time ,  physician notified    Pt is caox3 , speech is clear, follows commands , purposeful movement all extremities resp even , order received for stat CT head

## 2023-03-14 NOTE — FLOWSHEET NOTE
03/14/23 0711   Vital Signs   Temp 97.2 °F (36.2 °C)   Temp Source Oral   Heart Rate (!) 101   Resp 17   /82   MAP (Calculated) 99   BP Location Left upper arm   BP Method Automatic   Pain Assessment   Pain Assessment 0-10   Pain Level 5   Response to Pain Intervention Patient satisfied   Oxygen Therapy   SpO2 100 %   O2 Device Nasal cannula   O2 Flow Rate (L/min) 3 L/min   Pt in bed resp even no s/s of distress noted no needs voiced call light within reach bed in low position for pt safety see flow sheet for full assessment

## 2023-03-14 NOTE — PROGRESS NOTES
RT Inhaler-Nebulizer Bronchodilator Protocol Note    There is a bronchodilator order in the chart from a provider indicating to follow the RT Bronchodilator Protocol and there is an Initiate RT Inhaler-Nebulizer Bronchodilator Protocol order as well (see protocol at bottom of note). CXR Findings:  No results found. The findings from the last RT Protocol Assessment were as follows:   History Pulmonary Disease: (P) Chronic pulmonary disease  Respiratory Pattern: (P) Regular pattern and RR 12-20 bpm  Breath Sounds: (P) Slightly diminished and/or crackles  Cough: (P) Strong, spontaneous, non-productive  Indication for Bronchodilator Therapy: (P) Decreased or absent breath sounds, On home bronchodilators  Bronchodilator Assessment Score: (P) 4    Aerosolized bronchodilator medication orders have been revised according to the RT Inhaler-Nebulizer Bronchodilator Protocol below. Respiratory Therapist to perform RT Therapy Protocol Assessment initially then follow the protocol. Repeat RT Therapy Protocol Assessment PRN for score 0-3 or on second treatment, BID, and PRN for scores above 3. No Indications - adjust the frequency to every 6 hours PRN wheezing or bronchospasm, if no treatments needed after 48 hours then discontinue using Per Protocol order mode. If indication present, adjust the RT bronchodilator orders based on the Bronchodilator Assessment Score as indicated below. Use Inhaler orders unless patient has one or more of the following: on home nebulizer, not able to hold breath for 10 seconds, is not alert and oriented, cannot activate and use MDI correctly, or respiratory rate 25 breaths per minute or more, then use the equivalent nebulizer order(s) with same Frequency and PRN reasons based on the score. If a patient is on this medication at home then do not decrease Frequency below that used at home.     0-3 - enter or revise RT bronchodilator order(s) to equivalent RT Bronchodilator order with Frequency of every 4 hours PRN for wheezing or increased work of breathing using Per Protocol order mode. 4-6 - enter or revise RT Bronchodilator order(s) to two equivalent RT bronchodilator orders with one order with BID Frequency and one order with Frequency of every 4 hours PRN wheezing or increased work of breathing using Per Protocol order mode. 7-10 - enter or revise RT Bronchodilator order(s) to two equivalent RT bronchodilator orders with one order with TID Frequency and one order with Frequency of every 4 hours PRN wheezing or increased work of breathing using Per Protocol order mode. 11-13 - enter or revise RT Bronchodilator order(s) to one equivalent RT bronchodilator order with QID Frequency and an Albuterol order with Frequency of every 4 hours PRN wheezing or increased work of breathing using Per Protocol order mode. Greater than 13 - enter or revise RT Bronchodilator order(s) to one equivalent RT bronchodilator order with every 4 hours Frequency and an Albuterol order with Frequency of every 2 hours PRN wheezing or increased work of breathing using Per Protocol order mode.        Electronically signed by Estefany Loving RCP on 3/13/2023 at 11:10 PM

## 2023-03-14 NOTE — PLAN OF CARE
Problem: Discharge Planning  Goal: Discharge to home or other facility with appropriate resources  Outcome: Progressing  Flowsheets (Taken 3/13/2023 1801)  Discharge to home or other facility with appropriate resources: Identify barriers to discharge with patient and caregiver     Problem: Safety - Adult  Goal: Free from fall injury  Outcome: Progressing     Problem: Chronic Conditions and Co-morbidities  Goal: Patient's chronic conditions and co-morbidity symptoms are monitored and maintained or improved  Outcome: Progressing  Flowsheets (Taken 3/13/2023 1801)  Care Plan - Patient's Chronic Conditions and Co-Morbidity Symptoms are Monitored and Maintained or Improved: Monitor and assess patient's chronic conditions and comorbid symptoms for stability, deterioration, or improvement     Problem: Cardiovascular - Adult  Goal: Maintains optimal cardiac output and hemodynamic stability  Outcome: Progressing  Goal: Absence of cardiac dysrhythmias or at baseline  Outcome: Progressing

## 2023-03-14 NOTE — FLOWSHEET NOTE
03/13/23 1932   Vital Signs   Temp 98.5 °F (36.9 °C)   Temp Source Oral   Heart Rate (!) 107   Heart Rate Source Monitor   Resp 18   /82   MAP (Calculated) 101   BP Location Left lower arm   BP Method Automatic   Patient Position High fowlers   Level of Consciousness 0   MEWS Score 2   Pt assessment complete. Pt sitting up in recliner. Lung sounds diminished. Pt on 02 4 liters via nasal canula. Pt ST per monitor with HR of 107. Wife at bedside. Reviewed home medication list with her and perfect served Miles Beasley NP. Awaiting new orders. Xray up to xray right shoulder. Lidocaine patch applied to Right shoulder. Denies any needs at this time. Call light within reach.

## 2023-03-14 NOTE — PROGRESS NOTES
RT Inhaler-Nebulizer Bronchodilator Protocol Note    There is a bronchodilator order in the chart from a provider indicating to follow the RT Bronchodilator Protocol and there is an Initiate RT Inhaler-Nebulizer Bronchodilator Protocol order as well (see protocol at bottom of note). CXR Findings:  No results found. The findings from the last RT Protocol Assessment were as follows:   History Pulmonary Disease: (P) Chronic pulmonary disease  Respiratory Pattern: (P) Regular pattern and RR 12-20 bpm  Breath Sounds: (P) Slightly diminished and/or crackles  Cough: (P) Strong, spontaneous, non-productive  Indication for Bronchodilator Therapy: (P) On home bronchodilators  Bronchodilator Assessment Score: (P) 4    Aerosolized bronchodilator medication orders have been revised according to the RT Inhaler-Nebulizer Bronchodilator Protocol below. Respiratory Therapist to perform RT Therapy Protocol Assessment initially then follow the protocol. Repeat RT Therapy Protocol Assessment PRN for score 0-3 or on second treatment, BID, and PRN for scores above 3. No Indications - adjust the frequency to every 6 hours PRN wheezing or bronchospasm, if no treatments needed after 48 hours then discontinue using Per Protocol order mode. If indication present, adjust the RT bronchodilator orders based on the Bronchodilator Assessment Score as indicated below. Use Inhaler orders unless patient has one or more of the following: on home nebulizer, not able to hold breath for 10 seconds, is not alert and oriented, cannot activate and use MDI correctly, or respiratory rate 25 breaths per minute or more, then use the equivalent nebulizer order(s) with same Frequency and PRN reasons based on the score. If a patient is on this medication at home then do not decrease Frequency below that used at home.     0-3 - enter or revise RT bronchodilator order(s) to equivalent RT Bronchodilator order with Frequency of every 4 hours PRN for wheezing or increased work of breathing using Per Protocol order mode. 4-6 - enter or revise RT Bronchodilator order(s) to two equivalent RT bronchodilator orders with one order with BID Frequency and one order with Frequency of every 4 hours PRN wheezing or increased work of breathing using Per Protocol order mode. 7-10 - enter or revise RT Bronchodilator order(s) to two equivalent RT bronchodilator orders with one order with TID Frequency and one order with Frequency of every 4 hours PRN wheezing or increased work of breathing using Per Protocol order mode. 11-13 - enter or revise RT Bronchodilator order(s) to one equivalent RT bronchodilator order with QID Frequency and an Albuterol order with Frequency of every 4 hours PRN wheezing or increased work of breathing using Per Protocol order mode. Greater than 13 - enter or revise RT Bronchodilator order(s) to one equivalent RT bronchodilator order with every 4 hours Frequency and an Albuterol order with Frequency of every 2 hours PRN wheezing or increased work of breathing using Per Protocol order mode.          Electronically signed by Cristobal Stein RCP on 3/14/2023 at 8:33 AM

## 2023-03-15 ENCOUNTER — APPOINTMENT (OUTPATIENT)
Dept: CARDIAC REHAB | Age: 63
End: 2023-03-15
Payer: COMMERCIAL

## 2023-03-15 ENCOUNTER — TELEPHONE (OUTPATIENT)
Dept: CARDIAC REHAB | Age: 63
End: 2023-03-15

## 2023-03-15 LAB
ANION GAP SERPL CALCULATED.3IONS-SCNC: 12 MMOL/L (ref 3–16)
BASOPHILS # BLD: 0.1 K/UL (ref 0–0.2)
BASOPHILS NFR BLD: 0.6 %
BUN SERPL-MCNC: 37 MG/DL (ref 7–20)
CALCIUM SERPL-MCNC: 9.7 MG/DL (ref 8.3–10.6)
CHLORIDE SERPL-SCNC: 84 MMOL/L (ref 99–110)
CO2 SERPL-SCNC: 36 MMOL/L (ref 21–32)
CREAT SERPL-MCNC: 2 MG/DL (ref 0.8–1.3)
DEPRECATED RDW RBC AUTO: 16.8 % (ref 12.4–15.4)
EOSINOPHIL # BLD: 0 K/UL (ref 0–0.6)
EOSINOPHIL NFR BLD: 0.3 %
GFR SERPLBLD CREATININE-BSD FMLA CKD-EPI: 37 ML/MIN/{1.73_M2}
GLUCOSE BLD-MCNC: 178 MG/DL (ref 70–99)
GLUCOSE BLD-MCNC: 188 MG/DL (ref 70–99)
GLUCOSE BLD-MCNC: 202 MG/DL (ref 70–99)
GLUCOSE BLD-MCNC: 244 MG/DL (ref 70–99)
GLUCOSE SERPL-MCNC: 148 MG/DL (ref 70–99)
HCT VFR BLD AUTO: 47.3 % (ref 40.5–52.5)
HGB BLD-MCNC: 15.5 G/DL (ref 13.5–17.5)
LYMPHOCYTES # BLD: 2 K/UL (ref 1–5.1)
LYMPHOCYTES NFR BLD: 16.7 %
MAGNESIUM SERPL-MCNC: 2.7 MG/DL (ref 1.8–2.4)
MCH RBC QN AUTO: 29.1 PG (ref 26–34)
MCHC RBC AUTO-ENTMCNC: 32.8 G/DL (ref 31–36)
MCV RBC AUTO: 88.6 FL (ref 80–100)
MONOCYTES # BLD: 1.3 K/UL (ref 0–1.3)
MONOCYTES NFR BLD: 10.5 %
NEUTROPHILS # BLD: 8.7 K/UL (ref 1.7–7.7)
NEUTROPHILS NFR BLD: 71.9 %
PERFORMED ON: ABNORMAL
PLATELET # BLD AUTO: 227 K/UL (ref 135–450)
PMV BLD AUTO: 8.7 FL (ref 5–10.5)
POTASSIUM SERPL-SCNC: 3.7 MMOL/L (ref 3.5–5.1)
RBC # BLD AUTO: 5.34 M/UL (ref 4.2–5.9)
SODIUM SERPL-SCNC: 132 MMOL/L (ref 136–145)
WBC # BLD AUTO: 12.1 K/UL (ref 4–11)

## 2023-03-15 PROCEDURE — 99232 SBSQ HOSP IP/OBS MODERATE 35: CPT | Performed by: INTERNAL MEDICINE

## 2023-03-15 PROCEDURE — 2580000003 HC RX 258: Performed by: INTERNAL MEDICINE

## 2023-03-15 PROCEDURE — 94761 N-INVAS EAR/PLS OXIMETRY MLT: CPT

## 2023-03-15 PROCEDURE — 94640 AIRWAY INHALATION TREATMENT: CPT

## 2023-03-15 PROCEDURE — 6360000002 HC RX W HCPCS: Performed by: NURSE PRACTITIONER

## 2023-03-15 PROCEDURE — 99233 SBSQ HOSP IP/OBS HIGH 50: CPT | Performed by: NURSE PRACTITIONER

## 2023-03-15 PROCEDURE — 2700000000 HC OXYGEN THERAPY PER DAY

## 2023-03-15 PROCEDURE — 83735 ASSAY OF MAGNESIUM: CPT

## 2023-03-15 PROCEDURE — 2580000003 HC RX 258: Performed by: NURSE PRACTITIONER

## 2023-03-15 PROCEDURE — 80048 BASIC METABOLIC PNL TOTAL CA: CPT

## 2023-03-15 PROCEDURE — 85025 COMPLETE CBC W/AUTO DIFF WBC: CPT

## 2023-03-15 PROCEDURE — 2060000000 HC ICU INTERMEDIATE R&B

## 2023-03-15 PROCEDURE — 36415 COLL VENOUS BLD VENIPUNCTURE: CPT

## 2023-03-15 PROCEDURE — 6370000000 HC RX 637 (ALT 250 FOR IP): Performed by: INTERNAL MEDICINE

## 2023-03-15 PROCEDURE — 6370000000 HC RX 637 (ALT 250 FOR IP): Performed by: NURSE PRACTITIONER

## 2023-03-15 RX ORDER — SODIUM CHLORIDE 9 MG/ML
INJECTION, SOLUTION INTRAVENOUS CONTINUOUS
Status: DISCONTINUED | OUTPATIENT
Start: 2023-03-15 | End: 2023-03-16

## 2023-03-15 RX ORDER — ISOSORBIDE MONONITRATE 30 MG/1
30 TABLET, EXTENDED RELEASE ORAL 2 TIMES DAILY
COMMUNITY

## 2023-03-15 RX ORDER — PREGABALIN 100 MG/1
100 CAPSULE ORAL 2 TIMES DAILY
Status: DISCONTINUED | OUTPATIENT
Start: 2023-03-15 | End: 2023-03-17 | Stop reason: HOSPADM

## 2023-03-15 RX ADMIN — CLOPIDOGREL BISULFATE 75 MG: 75 TABLET ORAL at 09:26

## 2023-03-15 RX ADMIN — HYDROCODONE BITARTRATE AND ACETAMINOPHEN 1 TABLET: 5; 325 TABLET ORAL at 09:25

## 2023-03-15 RX ADMIN — Medication 2 PUFF: at 19:10

## 2023-03-15 RX ADMIN — ROPINIROLE HYDROCHLORIDE 1 MG: 1 TABLET, FILM COATED ORAL at 09:25

## 2023-03-15 RX ADMIN — RANOLAZINE 500 MG: 500 TABLET, FILM COATED, EXTENDED RELEASE ORAL at 20:47

## 2023-03-15 RX ADMIN — ATORVASTATIN CALCIUM 80 MG: 40 TABLET, FILM COATED ORAL at 20:47

## 2023-03-15 RX ADMIN — PREGABALIN 100 MG: 100 CAPSULE ORAL at 09:25

## 2023-03-15 RX ADMIN — INSULIN LISPRO 1 UNITS: 100 INJECTION, SOLUTION INTRAVENOUS; SUBCUTANEOUS at 11:34

## 2023-03-15 RX ADMIN — Medication 2 PUFF: at 09:03

## 2023-03-15 RX ADMIN — TIOTROPIUM BROMIDE INHALATION SPRAY 2 PUFF: 3.12 SPRAY, METERED RESPIRATORY (INHALATION) at 09:04

## 2023-03-15 RX ADMIN — MULTIPLE VITAMINS W/ MINERALS TAB 1 TABLET: TAB at 09:25

## 2023-03-15 RX ADMIN — SODIUM CHLORIDE: 9 INJECTION, SOLUTION INTRAVENOUS at 13:57

## 2023-03-15 RX ADMIN — HYDROCODONE BITARTRATE AND ACETAMINOPHEN 1 TABLET: 5; 325 TABLET ORAL at 20:47

## 2023-03-15 RX ADMIN — RANOLAZINE 500 MG: 500 TABLET, FILM COATED, EXTENDED RELEASE ORAL at 09:25

## 2023-03-15 RX ADMIN — ENOXAPARIN SODIUM 30 MG: 100 INJECTION SUBCUTANEOUS at 09:26

## 2023-03-15 RX ADMIN — Medication 5 ML: at 09:27

## 2023-03-15 RX ADMIN — PREGABALIN 100 MG: 100 CAPSULE ORAL at 20:47

## 2023-03-15 RX ADMIN — AMITRIPTYLINE HYDROCHLORIDE 10 MG: 10 TABLET, FILM COATED ORAL at 20:47

## 2023-03-15 RX ADMIN — EMPAGLIFLOZIN 10 MG: 10 TABLET, FILM COATED ORAL at 09:30

## 2023-03-15 RX ADMIN — ENOXAPARIN SODIUM 30 MG: 100 INJECTION SUBCUTANEOUS at 20:47

## 2023-03-15 RX ADMIN — BREXPIPRAZOLE 1 MG: 1 TABLET ORAL at 09:30

## 2023-03-15 RX ADMIN — ASPIRIN 81 MG: 81 TABLET, COATED ORAL at 09:25

## 2023-03-15 ASSESSMENT — PAIN DESCRIPTION - ORIENTATION
ORIENTATION: RIGHT;LEFT
ORIENTATION: RIGHT
ORIENTATION: RIGHT

## 2023-03-15 ASSESSMENT — PAIN DESCRIPTION - DESCRIPTORS
DESCRIPTORS: SHARP
DESCRIPTORS: SHARP
DESCRIPTORS: ACHING

## 2023-03-15 ASSESSMENT — ENCOUNTER SYMPTOMS
GASTROINTESTINAL NEGATIVE: 1
SHORTNESS OF BREATH: 1

## 2023-03-15 ASSESSMENT — PAIN SCALES - GENERAL
PAINLEVEL_OUTOF10: 6
PAINLEVEL_OUTOF10: 6
PAINLEVEL_OUTOF10: 7

## 2023-03-15 ASSESSMENT — PAIN DESCRIPTION - DIRECTION: RADIATING_TOWARDS: CHEST

## 2023-03-15 ASSESSMENT — PAIN DESCRIPTION - LOCATION
LOCATION: SHOULDER

## 2023-03-15 ASSESSMENT — PAIN - FUNCTIONAL ASSESSMENT: PAIN_FUNCTIONAL_ASSESSMENT: PREVENTS OR INTERFERES SOME ACTIVE ACTIVITIES AND ADLS

## 2023-03-15 ASSESSMENT — PAIN DESCRIPTION - PAIN TYPE: TYPE: CHRONIC PAIN;ACUTE PAIN

## 2023-03-15 NOTE — PROGRESS NOTES
IM Progress Note    Admit Date:  3/13/2023    Presented for chest pain, leg swelling, fatigue and weight gain  Admitted for further evaluation and management of acute CHF exacerbation and chest pain rule out. Cardiology consulted  became dizzy and diaphoretic after he had a BM. BP low- pt placed back on [place] . Started on fluid bolus . Subjective:    Mr. Surya Iniguez seen up in chair , does not feel any better   Feels dizzy and groggy today, thirsty and drinking a lot    Held diuresis     Objective:   Patient Vitals for the past 4 hrs:   BP Temp Temp src Pulse Resp SpO2 Weight   03/15/23 0415 (!) 93/53 97.9 °F (36.6 °C) Oral 88 18 94 % 296 lb 4.8 oz (134.4 kg)       Blood pressure was down to the 70s, with 250 mL fluid bolus systolic blood pressure up to 89/50 now. , RR 16, afebrile     Intake/Output Summary (Last 24 hours) at 3/15/2023 0745  Last data filed at 3/15/2023 0415  Gross per 24 hour   Intake 1210 ml   Output 675 ml   Net 535 ml         Physical Exam:  Gen: ,Patient appears ill , up in chair complains of feeling dizzy . Mild distress. Alert. Appears older than stated age, obese   Eyes: PERRL. No sclera icterus. No conjunctival injection. ENT: No discharge. Pharynx clear. Neck: No JVD. Trachea midline. Resp: No accessory muscle use. No crackles. No wheezes. No rhonchi. CV: Tachycardia. Regular rhythm. No murmur. No rub. +BLE edema. PPM left chest old healed scar   GI: Non-tender. +distended. No masses. Normal bowel sounds. Skin: Warm and dry. No nodule on exposed extremities. No rash on exposed extremities. M/S: No cyanosis. No joint deformity. No clubbing. ROM limited to right upper extremity, neurovascularly intact. ++pulses warm to touch   Neuro: Awake. Grossly nonfocal    Psych: Oriented x 3. No anxiety or agitation.        Scheduled Meds:   sacubitril-valsartan  1 tablet Oral BID    furosemide  40 mg Oral Daily    sodium chloride flush  5-40 mL IntraVENous 2 times per day    enoxaparin  30 mg SubCUTAneous BID    insulin lispro  0-4 Units SubCUTAneous TID WC    insulin lispro  0-4 Units SubCUTAneous Nightly    lidocaine  1 patch TransDERmal Daily    amitriptyline  10 mg Oral Nightly    aspirin  81 mg Oral Daily    atorvastatin  80 mg Oral Daily    brexpiprazole  1 mg Oral Daily    clopidogrel  75 mg Oral Daily    empagliflozin  10 mg Oral Daily    isosorbide mononitrate  120 mg Oral Daily    metoprolol succinate  25 mg Oral Daily    therapeutic multivitamin-minerals  1 tablet Oral Daily    pregabalin  150 mg Oral BID    ranolazine  500 mg Oral BID    rOPINIRole  1 mg Oral Daily    spironolactone  25 mg Oral Daily    budesonide-formoterol  2 puff Inhalation BID    And    tiotropium  2 puff Inhalation Daily    albuterol sulfate HFA  2 puff Inhalation BID       Continuous Infusions:   sodium chloride      dextrose         PRN Meds:  calcium carbonate, sodium chloride flush, sodium chloride, ondansetron **OR** ondansetron, polyethylene glycol, acetaminophen **OR** acetaminophen, potassium chloride **OR** potassium alternative oral replacement **OR** potassium chloride, magnesium sulfate, glucose, dextrose bolus **OR** dextrose bolus, glucagon (rDNA), dextrose, benzonatate, HYDROcodone 5 mg - acetaminophen, albuterol      Data:  CBC:   Recent Labs     03/13/23  1300 03/14/23  0430 03/15/23  0438   WBC 12.8* 9.7 12.1*   HGB 16.7 16.9 15.5   HCT 50.9 50.6 47.3   MCV 88.5 88.1 88.6    196 227       BMP:   Recent Labs     03/13/23  1300 03/14/23  0430 03/15/23  0438   * 134* 132*   K 3.8 3.3* 3.7   CL 89* 88* 84*   CO2 29 32 36*   BUN 19 21* 37*   CREATININE 1.0 1.0 2.0*       LIVER PROFILE:   Recent Labs     03/13/23  1300   AST 29   ALT 41*   BILITOT 1.3*   ALKPHOS 91       PT/INR: No results for input(s): PROTIME, INR in the last 72 hours. CULTURES  Covid/influenza not detected        RADIOLOGY  CT HEAD WO CONTRAST   Final Result   No acute intracranial abnormality.         XR SHOULDER RIGHT (MIN 2 VIEWS)   Final Result   No acute abnormality.         CT CHEST PULMONARY EMBOLISM W CONTRAST   Final Result   No evidence of pulmonary embolism.      No evidence of thoracic aortic aneurysm or dissection.      Evidence of previous CABG.      Minimal dependent atelectatic changes at the posterior lung bases   bilaterally.  No other focal abnormality or acute process in the lungs.  No   evidence of pleural effusion or pneumothorax.  Evidence of at least moderate   diffuse fatty infiltration in visualized portion of the liver.      No subphrenic acute process demonstrated.             Assessment/Plan:      Acute on Chronic combined CHF  - echo completed on 2/3/2023 showed EF 46% with grade 1 DD  - has been following with [facility] and Lasix was recently transitioned to torsemide and aldactone was added as well as Zaroxolyn weekly   - weight on admission 301--monitor;  dry weight per patient is 296  - BNP normal  - started on IV Lasix 40 mg BID-  - cardiology consulted, pt wanted to stay here at [facility] instead of being transferred to [facility]  - he does cardiac rehab here  - daily weights, low sodium diet, fluid restriction and strict I/O  -Seen by cardiology, hold Entresto and Imdur for low BP and dizziness     Hypotension and ARF    Sec to multiple BP meds and addition of entresto  Start IVF   -Hold Entresto ,diuresis         CAD  Hx of CABG 9/2020  Chest Pain  Hx of ischemic cardiomyopathy  - serial troponins flat   - no acute EKG changes noted   - d-dimer elevated and CTPA negative for PE  - hx of CABG on 9/2020. S/p cardiac catheterization on 04/2021   - appears patient is on aspirin, Plavix, Toprol, Imdur  - monitor on telemetry  - cardiology consulted - r/o MI and low suspicion for ACS  - started on entresto and holding     SSS  S/p PPM on 8/26/2021     Right shoulder pain- appears to be musculoskeletal in nature  - neurovascularly intact  - decreased ROM  -+pulses  - X-ray non-acute   - add  Lidocaine patch     Leukocytosis  - likely reactive, procal 0.22   - no s/sx of infection  - monitor CBC     Hyponatremia  - likely 2/2 fluid overload  - 132 --> 134  - monitor with diuresis      Metabolic Syndrome with DM 2   - recently started on Metformin per PCP note  - will hold  - A1c 7.7%  - start SSI  - carb control diet   - monitor      COPD with chronic hypoxic resp failure  OANH   - stable on 4 L   - continue home inhalers    Hx of tobacco abuse      Morbid Obesity  - body mass index is 51.72 kg/m². - complicating assessment and treatment. Placing patient at risk for multiple co-morbidities as well as early death and contributing to the patient's presentation. - counseled on weight loss. DVT Prophylaxis: Lovenox  Diet: ADULT DIET;  Regular; 4 carb choices (60 gm/meal); 2000 ml  Code Status: Full Code     Yoon Quintero MD   3/15/2023

## 2023-03-15 NOTE — PLAN OF CARE
HEART FAILURE CARE PLAN:    Comorbidities Reviewed: No   Patient has a past medical history of Acute diverticulitis, Acute kidney injury ([facility] [address] Acute on chronic respiratory failure with hypoxemia ([facility] [address] Acute respiratory failure ([address] Acute respiratory failure with hypoxia ([facility] [address] Anxiety, Aortic valve calcification, CAD in native artery, Chronic obstructive pulmonary disease (HCC), Chronic systolic congestive heart failure (HCC), Class 3 severe obesity with body mass index (BMI) of 45.0 to 49.9 in [facility]), Coronary artery calcification, Coronary artery disease involving native heart with angina pectoris ([facility] [address] Crush injury of right foot, DDD (degenerative disc disease), lumbar, Depression, Diverticulitis of colon, Erectile dysfunction, Fatigue, HNP (herniated nucleus pulposus), lumbar, Hyperglycemia, Hyperlipidemia, Hypersomnia, Hypertension, Insomnia, Ischemic cardiomyopathy, Kidney stone, Lumbar radiculopathy, Lumbar spine pain, LVH (left ventricular hypertrophy), Mobitz type I Wenckebach atrioventricular block, Moderate protein-calorie malnutrition ([address] Observed sleep apnea, OANH (obstructive sleep apnea), Pneumonia, primary atypical, Reactive depression, S/P three vessel coronary artery bypass, Spondylosis without myelopathy or radiculopathy, lumbar region, Tobacco abuse, Tobacco use, and Wears dentures. ECHOCARDIOGRAM Reviewed: Yes   Patient's Ejection Fraction (EF) is greater than 40%    Weights Reviewed:  Yes   Admission weight: (!) 302 lb (137 kg)   Wt Readings from Last 3 Encounters:   03/15/23 296 lb 4.8 oz (134.4 kg)   02/16/23 (!) 301 lb (136.5 kg)   02/08/23 298 lb (135.2 kg)     Intake & Output Reviewed: Yes     Intake/Output Summary (Last 24 hours) at 3/15/2023 1036  Last data filed at 3/15/2023 1017  Gross per 24 hour   Intake 1330 ml   Output 975 ml   Net 355 ml     Medications Reviewed: Yes   SCHEDULED HOSPITAL MEDICATIONS:   pregabalin  100 mg Oral BID    [Held by provider] Patient would like communication of their results via:      Tori     sacubitril-valsartan  1 tablet Oral BID    [Held by provider] furosemide  40 mg Oral Daily    sodium chloride flush  5-40 mL IntraVENous 2 times per day    enoxaparin  30 mg SubCUTAneous BID    insulin lispro  0-4 Units SubCUTAneous TID WC    insulin lispro  0-4 Units SubCUTAneous Nightly    lidocaine  1 patch TransDERmal Daily    amitriptyline  10 mg Oral Nightly    aspirin  81 mg Oral Daily    atorvastatin  80 mg Oral Daily    brexpiprazole  1 mg Oral Daily    clopidogrel  75 mg Oral Daily    empagliflozin  10 mg Oral Daily    [Held by provider] isosorbide mononitrate  120 mg Oral Daily    [Held by provider] metoprolol succinate  25 mg Oral Daily    therapeutic multivitamin-minerals  1 tablet Oral Daily    ranolazine  500 mg Oral BID    rOPINIRole  1 mg Oral Daily    [Held by provider] spironolactone  25 mg Oral Daily    budesonide-formoterol  2 puff Inhalation BID    And    tiotropium  2 puff Inhalation Daily    albuterol sulfate HFA  2 puff Inhalation BID     ACE/ARB/ARNI is REQUIRED for EF </= 97% SYSTOLIC FAILURE:   ACE[de-identified] None  ARB[de-identified] None  ARNI[de-identified] Sacubitril/Valsartan-Entresto    Evidenced-Based Beta Blocker is REQUIRED for EF </= 19% SYSTOLIC FAILURE:   [de-identified] Metoprolol SUCCinate- Toprol XL    Diuretics:  [de-identified] Lasix held Aldactone-held    Diet Reviewed: Yes   ADULT DIET; Regular; 4 carb choices (60 gm/meal); 2000 ml    Goal of Care Reviewed: Yes   Patient and/or Family's stated Goal of Care this Admission: TO reduce acitivty intolerance shortness of breath prior to discharge.       Reviewed daily weight check and importance of 2000 ml fluid restriction with pt to control CHF

## 2023-03-15 NOTE — PROGRESS NOTES
RT Inhaler-Nebulizer Bronchodilator Protocol Note    There is a bronchodilator order in the chart from a provider indicating to follow the RT Bronchodilator Protocol and there is an Initiate RT Inhaler-Nebulizer Bronchodilator Protocol order as well (see protocol at bottom of note). CXR Findings:  No results found. The findings from the last RT Protocol Assessment were as follows:   History Pulmonary Disease: (P) Chronic pulmonary disease  Respiratory Pattern: (P) Regular pattern and RR 12-20 bpm  Breath Sounds: (P) Slightly diminished and/or crackles  Cough: (P) Strong, spontaneous, non-productive  Indication for Bronchodilator Therapy: (P) On home bronchodilators  Bronchodilator Assessment Score: (P) 4    Aerosolized bronchodilator medication orders have been revised according to the RT Inhaler-Nebulizer Bronchodilator Protocol below. Respiratory Therapist to perform RT Therapy Protocol Assessment initially then follow the protocol. Repeat RT Therapy Protocol Assessment PRN for score 0-3 or on second treatment, BID, and PRN for scores above 3. No Indications - adjust the frequency to every 6 hours PRN wheezing or bronchospasm, if no treatments needed after 48 hours then discontinue using Per Protocol order mode. If indication present, adjust the RT bronchodilator orders based on the Bronchodilator Assessment Score as indicated below. Use Inhaler orders unless patient has one or more of the following: on home nebulizer, not able to hold breath for 10 seconds, is not alert and oriented, cannot activate and use MDI correctly, or respiratory rate 25 breaths per minute or more, then use the equivalent nebulizer order(s) with same Frequency and PRN reasons based on the score. If a patient is on this medication at home then do not decrease Frequency below that used at home.     0-3 - enter or revise RT bronchodilator order(s) to equivalent RT Bronchodilator order with Frequency of every 4 hours PRN for wheezing or increased work of breathing using Per Protocol order mode. 4-6 - enter or revise RT Bronchodilator order(s) to two equivalent RT bronchodilator orders with one order with BID Frequency and one order with Frequency of every 4 hours PRN wheezing or increased work of breathing using Per Protocol order mode. 7-10 - enter or revise RT Bronchodilator order(s) to two equivalent RT bronchodilator orders with one order with TID Frequency and one order with Frequency of every 4 hours PRN wheezing or increased work of breathing using Per Protocol order mode. 11-13 - enter or revise RT Bronchodilator order(s) to one equivalent RT bronchodilator order with QID Frequency and an Albuterol order with Frequency of every 4 hours PRN wheezing or increased work of breathing using Per Protocol order mode. Greater than 13 - enter or revise RT Bronchodilator order(s) to one equivalent RT bronchodilator order with every 4 hours Frequency and an Albuterol order with Frequency of every 2 hours PRN wheezing or increased work of breathing using Per Protocol order mode.        Electronically signed by Tangela Hough RCP on 3/14/2023 at 10:13 PM

## 2023-03-15 NOTE — PROGRESS NOTES
[facility]  Cardiology  Progress Note    Admission date:  3/13/2023    Reason for follow up visit: CHF, known CAD    HPI/CC: Randell Barrientos is a 63 y.o. male who presented 3/13/2023 for fatigue and SOB. Ruled out for MI. Treated for acute on chronic CHF and now DENIA. Rhythm has been sinus. Subjective: Ongoing fatigue, weakness, shortness of breath, chest pain worse with palpation, dizziness. BP low. Vitals:  Blood pressure (!) 93/53, pulse 80, temperature 97.9 °F (36.6 °C), temperature source Oral, resp. rate 18, height 5' 4\" (1.626 m), weight 296 lb 4.8 oz (134.4 kg), SpO2 93 %.   Temp  Av.9 °F (37.2 °C)  Min: 97.9 °F (36.6 °C)  Max: 99.4 °F (37.4 °C)  Pulse  Av.7  Min: 80  Max: 92  BP  Min: 75/46  Max: 97/58  SpO2  Av.5 %  Min: 92 %  Max: 95 %    24 hour I/O    Intake/Output Summary (Last 24 hours) at 3/15/2023 1050  Last data filed at 3/15/2023 1017  Gross per 24 hour   Intake 1150 ml   Output 650 ml   Net 500 ml     Current Facility-Administered Medications   Medication Dose Route Frequency Provider Last Rate Last Admin    pregabalin (LYRICA) capsule 100 mg  100 mg Oral BID Gene Monday, MD   100 mg at 03/15/23 0925    [Held by provider] sacubitril-valsartan (ENTRESTO) 24-26 MG per tablet 1 tablet  1 tablet Oral BID Ayala Aldana MD   1 tablet at 23 5421    [Held by provider] furosemide (LASIX) tablet 40 mg  40 mg Oral Daily Fabian Deleon MD        calcium carbonate (TUMS) chewable tablet 500 mg  500 mg Oral TID PRN Joanie Toussaint MD   500 mg at 23 2324    sodium chloride flush 0.9 % injection 5-40 mL  5-40 mL IntraVENous 2 times per day ANICETO Bae - CNP   5 mL at 03/15/23 0927    sodium chloride flush 0.9 % injection 5-40 mL  5-40 mL IntraVENous PRN ANICETO Bae CNP        0.9 % sodium chloride infusion   IntraVENous PRN ANICETO Bae - ROXANA        ondansetron (ZOFRAN-ODT) disintegrating tablet 4 mg  4 mg Oral Q8H PRN ANICETO Bae - CNP        Or    ondansetron (ZOFRAN) injection 4 mg  4 mg IntraVENous Q6H PRN Faith Zelaya, APRN - CNP   4 mg at 03/14/23 1730    polyethylene glycol (GLYCOLAX) packet 17 g  17 g Oral Daily PRN Faith Zelaya, APRN - CNP        acetaminophen (TYLENOL) tablet 650 mg  650 mg Oral Q6H PRN Faith Zelaya, APRN - CNP        Or    acetaminophen (TYLENOL) suppository 650 mg  650 mg Rectal Q6H PRN Faith Zelaya, APRN - CNP        enoxaparin Sodium (LOVENOX) injection 30 mg  30 mg SubCUTAneous BID Faith Zelaya, APRN - CNP   30 mg at 03/15/23 0926    potassium chloride (KLOR-CON M) extended release tablet 40 mEq  40 mEq Oral PRN Faith Zelaya, APRN - CNP        Or    potassium bicarb-citric acid (EFFER-K) effervescent tablet 40 mEq  40 mEq Oral PRN Faith Zelaya, APRN - CNP        Or    potassium chloride 10 mEq/100 mL IVPB (Peripheral Line)  10 mEq IntraVENous PRN Faith Zelaya, APRN - CNP        magnesium sulfate 2000 mg in 50 mL IVPB premix  2,000 mg IntraVENous PRN Faith Zelaya, APRN - CNP        insulin lispro (HUMALOG) injection vial 0-4 Units  0-4 Units SubCUTAneous TID WC Faith Zelaya, APRN - CNP   2 Units at 03/14/23 1609    insulin lispro (HUMALOG) injection vial 0-4 Units  0-4 Units SubCUTAneous Nightly Faith Zelaya, APRN - CNP        glucose chewable tablet 16 g  4 tablet Oral PRN Faith Zelaya, APRN - CNP        dextrose bolus 10% 125 mL  125 mL IntraVENous PRN Faith Zelaya, APRN - CNP        Or    dextrose bolus 10% 250 mL  250 mL IntraVENous PRN Faith Zelaya, APRN - CNP        glucagon (rDNA) injection 1 mg  1 mg SubCUTAneous PRN Faith Zelaya, APRN - CNP        dextrose 10 % infusion   IntraVENous Continuous PRN Faith Zelaya, APRN - CNP        lidocaine 4 % external patch 1 patch  1 patch TransDERmal Daily Faith Zelaya, APRN - CNP   1 patch at 03/15/23 0924    amitriptyline (ELAVIL) tablet 10 mg  10 mg Oral Nightly Faith Zelaya, APRN - CNP   10 mg at 03/14/23 2324    aspirin EC tablet 81 mg  81 mg Oral Daily ANICETO Christiansen CNP   81 mg at 03/15/23  2797    atorvastatin (LIPITOR) tablet 80 mg  80 mg Oral Daily ANICETO Allen CNP   80 mg at 03/14/23 2325    benzonatate (TESSALON) capsule 100 mg  100 mg Oral TID PRN ANICETO Allen CNP        brexpiprazole (REXULTI) tablet 1 mg  1 mg Oral Daily ANICETO Allen - CNP   1 mg at 03/15/23 0930    clopidogrel (PLAVIX) tablet 75 mg  75 mg Oral Daily ANICETO Allen - CNP   75 mg at 03/15/23 0926    empagliflozin (JARDIANCE) tablet 10 mg  10 mg Oral Daily ANICETO Allen - CNP   10 mg at 03/15/23 0930    [Held by provider] isosorbide mononitrate (IMDUR) extended release tablet 120 mg  120 mg Oral Daily ANICETO Allen - CNP   120 mg at 03/14/23 0834    [Held by provider] metoprolol succinate (TOPROL XL) extended release tablet 25 mg  25 mg Oral Daily ANICETO Allen CNP   25 mg at 03/14/23 0834    therapeutic multivitamin-minerals 1 tablet  1 tablet Oral Daily ANICETO Allen - CNP   1 tablet at 03/15/23 0925    ranolazine (RANEXA) extended release tablet 500 mg  500 mg Oral BID ANICETO Allen CNP   500 mg at 03/15/23 0925    rOPINIRole (REQUIP) tablet 1 mg  1 mg Oral Daily ANICETO Allen - CNP   1 mg at 03/15/23 1934    [Held by provider] spironolactone (ALDACTONE) tablet 25 mg  25 mg Oral Daily ANICETO Allen - CNP   25 mg at 03/14/23 0833    HYDROcodone-acetaminophen (NORCO) 5-325 MG per tablet 1 tablet  1 tablet Oral Q8H PRN ANICETO Allen - CNP   1 tablet at 03/15/23 0925    budesonide-formoterol (SYMBICORT) 160-4.5 MCG/ACT inhaler 2 puff  2 puff Inhalation BID ANICETO Allen CNP   2 puff at 03/15/23 0903    And    tiotropium (SPIRIVA RESPIMAT) 2.5 MCG/ACT inhaler 2 puff  2 puff Inhalation Daily ANICETO Allen CNP   2 puff at 03/15/23 0904    albuterol sulfate HFA (PROVENTIL;VENTOLIN;PROAIR) 108 (90 Base) MCG/ACT inhaler 2 puff  2 puff Inhalation BID Perfecto Sanders MD   2 puff at 03/15/23 0903    albuterol (PROVENTIL) nebulizer solution 2.5 mg  2.5 mg Nebulization Q4H PRN Wai Muhammad Donaldo Mcdaniel MD         Review of Systems   Constitutional:  Positive for fatigue. Respiratory:  Positive for shortness of breath. Cardiovascular:  Positive for chest pain. Gastrointestinal: Negative. Neurological:  Positive for dizziness and weakness. Objective:     Telemetry monitor: SR    Physical Exam:  Constitutional:  Comfortable and alert, NAD, appears older than stated age, fatigued, obese  Eyes: PERRL, sclera nonicteric  Neck:  Supple, no masses, no thyroidmegaly, no JVD  Skin:  Warm and dry; no rash or lesions  Heart:  Regular, normal apex, S1 and S2 normal, no M/G/R  Lungs:  Normal respiratory effort; clear; no wheezing/rhonchi/rales  Abdomen: soft, non tender, + bowel sounds  Extremities:  +BLE edema  Neuro: alert and oriented, moves legs and arms equally, normal mood and affect    Data Reviewed:    Echo 2/2023:  - Left ventricle: The cavity size is normal. Left ventricular     geometry shows evidence of concentric remodeling, with normal     ventricular mass and mildly increased relative wall thickness. Systolic function was mildly reduced. The calculated ejection     fraction was 46%. Wall motion was normal; there were no regional     wall motion abnormalities. Doppler parameters are consistent with     abnormal left ventricular relaxation (grade 1 diastolic     dysfunction). The stroke volume is 76ml. The stroke index is     32ml/m^2. - Aortic valve: The peak systolic gradient is 8mm Hg.   - Mitral valve: The annulus is mildly calcified. The leaflets are     mildly thickened. Coronary angiogram 1/25/2023:  · Successful  PCI of the ramus using antegrade wiring and receiving a   new 2.5 x 18 mm Xience Skypoint drug-eluting stent at the distal area of   occlusion. · Successful laser atherectomy of InStent restenoses of OM1 with   aggressive angioplasty performed but no new stent later added.    · Elevated LVEDP   · Dual antiplatelet therapy as part of guideline directed medical therapy   for ischemic heart disease   · If restenosis occurs, would consider enrollment in drug coated balloon   trial or brachytherapy     Coronary angiogram 12/22/2021: Intermediate risk abnormal stress test  PROCEDURES PERFORMED    Left heart catheterization  LVgram  Aortogram  Coronary angiogam  Coronary cath  LIMA angiogram  Monitoring of moderate conscious sedation  IVUS of D1  PCI of D1 with single drug-eluting stent  IVUS of RCA  Rotational atherectomy of RCA  Shockwave coronary lithotripsy of RCA  PCI of RCA with single drug-eluting stent  PROCEDURE DESCRIPTION   Risks/benefits/alternatives/outcomes were discussed with patient and/or family and informed consent was obtained. Using the [facility] scale, the patient's right radial artery was found to be a level B. Patient was prepped draped in the usual sterile fashion. Local anaesthetic was applied over puncture sites. Using ultrasound guidance, unsuccessful attempt was made at right radial artery cannulation, and as such using ultrasound and fluoroscopic technique, attention turned towards the right groin and a 5 Cymraes sheath was inserted into the right femoral artery. Diagnostic 5 Cymraes pigtail, JL 4.5, 3 DRC catheters used for diagnostic angiography. Attention then turned towards PCI as noted below. At end of procedure, the interventional sheath was sutured in place for later removal.    There were no immediate complications. I supervised sedation from 9:15 AM to 11:45 AM with versed 9 mg/fentanyl 300 mcg during the procedure. An independent trained observer pushed [employer] at my direction. We monitored the patient's level of consciousness and vital signs/physiologic status throughout the procedure duration (see times listed previously). 285 cc contrast was utilized. <20cc EBL  FINDINGS      LVEDP  10   GRADIENT ACROSS AORTIC VALVE  none   LV FUNCTION EF 55-60%   WALL MOTION Subtle mid inferior hypokinesis.    MITRAL REGURGITATION  mild      CALIBER mildly increased in size   AORTIC INSUFFICIENCY  minimal   BYPASS GRAFTS  no bypass grafts visualized      LM Bqzeoxrh-vuk-hzfroh less than 10% stenosis, there is a stent noted in the mid to distal left main that is widely patent. LAD Proximal-mid 80% stenosis, there is mid-distal nonvisualization of vessel due to bidirectional flow from the LIMA graft. LCX  Calcified, ostial 85% stenosis, proximal-mid 20% stenosis. OM 1 has 100% wohkzzul-whn-hnukkg . There are well-developed right to left collaterals as well as some left to left collaterals. RCA Large vessel, dominant, calcified, mild to moderately tortuous, proximal-mid 75% stenosis. Distal 50 to 60% stenosis. PDA is a medium to large size vessel with proximal-mid 60-70% stenosis. LIMA-LAD  widely patent, there is less than 10% proximal-mid stenosis, however distally at the anastomosis there is a 60% stenosis. SVG-D1  known to be occluded, not injected         SVG-OM  known to be occluded, not injected            PERCUTANEOUS INTERVENTION DESCRIPTION    Bivalirudin was used for anticoagulation, at the end of the procedure, patient was given oral loading dose of Plavix as well as a single bolus of Integrilin. The initial 5 Georgian groin sheath was upsized to a 6 Western Janis Terumo 45 cm destination sheath. A 6 Georgian VL 3.5 guiding cath was used to intubate the left main. A run-through wire was used to cross the lesions in D1 and the lesions of D1 were assessed with IVUS which showed severe in-stent restenosis with a minimal luminal diameter of 2.75 to 3 mm. As such lesions were treated with a 2.75 mm cutting balloon as well as a 3 mm noncompliant balloon and then were stented with a Medtronic resolute Borden 2.75 x 18 mm drug-eluting stent. Stent was postdilated with 3 mm noncompliant balloon. Follow-up IVUS showed good stent apposition/expansion, there was no residual dissection/thrombus noted.   As such, attention turned towards the RCA and the initial 6 Nepali Terumo destination sheath was upsized to a 7 Western Janis 45 cm Terumo destination sheath in the right groin. Then a 4401 Genwords guiding cath was used to intubate the RCA. A run-through wire was used to cross the lesions in the RCA and IVUS was performed of the RCA which showed extensive calcification in the proximal-mid vessel with 75% stenoses. As such a Rotafloppy wire was taken and that was used to cross the lesions and the run-through wire was removed. Rotational atherectomy was then performed with a 1.5 mm angela. Shockwave coronary lithotripsy was then performed with a 4 mm balloon. Lesion was then stented with assistance of 7 Western Janis guide extension catheter with a [place]ill[place] resolute Bono 4 x 34 mm drug-eluting stent and stent was postdilated with a 4 mm noncompliant balloon and additionally in the proximal segment with a 5 mm noncompliant balloon. Follow-up IVUS showed good stent apposition/expansion. There was no residual dissection/thrombus noted. There was mild to moderate disease noted in the mid-distal vessel beyond the stented segment and this was felt to be best treated medically. During the procedure, patient was restless and required redirection as well as increased sedation. Groin site remained stable throughout the case, overall, hemodynamics and EKG were stable although during the procedure he did have to be supported with phenylephrine but this was ultimately able to be weaned at the end of the procedure. CONCLUSIONS:    Successful PCI of D1 with single drug-eluting stent   Successful rotational atherectomy of RCA  Successful shockwave coronary lithotripsy of RCA  Successful PCI of RCA with single drug-eluting stent   Consider staged PCI of LIMA to LAD   Consider referral to [address] [place] [address] Dr Cali Rios for consideration of LCx  PCI     Echo 12/2021:  Technically difficult examination secondary to habitus.    LV systolic function is normal with EF estimated at 55%. Endocardium not entirely well visualized but no obvious segmental wall   motion abnormalities. There is mild concentric left ventricular hypertrophy. Normal diastolic function with normal LV filling pressure. Mild mitral annular calcification. Aortic valve appears sclerotic but opens adequately. Individual aortic valve leaflets are not clearly visualized. Cannot r/o   bicuspid valve. Coronary angiogram 4/2021:  Unstable angina  PROCEDURES PERFORMED    Left heart catheterization  Coronary angiogam  Coronary cath  Temporary transvenous pacer insertion and removal  Insertion of short term external heart assist system into heart, intraoperative, percutaneous approach  Assistance with cardiac output using impeller pump, continuous  IVUS of left main  IVUS of LAD  IVUS of D1  Rotational atherectomy of left main  Rotational atherectomy of LAD  Rotational atherectomy of D1  PCI of D1 with single drug-eluting stent  PCI of left main/LAD with single drug-eluting stent  PROCEDURE DESCRIPTION   Risks/benefits/alternatives/outcomes were discussed with patient and/or family and informed consent was obtained. Using the [facility] scale, the patient's right radial artery was found to be a level B. Patient was prepped draped in the usual sterile fashion. Local anaesthetic was applied over puncture site. Using a back wall technique and ultrasound guidance, a 6 Burmese Terumo sheath was inserted into right radial artery. Verapamil, nitroglycerin, nicardipine were administered through the sheath. Using fluoroscopic and ultrasound guidance, an 8 Guinean sheath was inserted into the right common femoral vein. A temporary transvenous pacer was inserted and capture was demonstrated and this was placed in backup mode during the procedure.   Temporary transvenous pacer was removed at the end of the case and the venous sheath was sutured in place for later removal.  Using similar technique, a 6 Citizen of the [place] sheath was placed into the left common femoral artery. A single Perclose device was then inserted using preclosed technique and then the site was dilated with 10 and 12 Western Janis dilators and a an Impella CP sheath was placed. Using a pigtail catheter, the aortic valve was crossed for left heart catheterization. Then the 0.018 inch Impella wire was advanced into the left ventricle and then the pigtail was exchanged for the Impella catheter. Impella was used during the procedure and was able to be weaned and removed at the end of the procedure. Using a 6 Citizen of the [place] 110 cm Terumo sheath along with a 10 mm balloon, a dry close technique was employed along with the single Perclose to close the left common femoral artery arteriotomy. At the conclusion of the procedure, a TR band was placed over the puncture site and hemostasis was obtained. There were no immediate complications. Sedation was provided by the anesthesiology service. 325 cc contrast was utilized. <50cc EBL. LVEDP  5    See diagnostic catheterization report for full details, would add that there is severe tortuosity and eccentric plaque within the left main, LAD and D1 which proximal to mid was 75% and distally was 99%. PERCUTANEOUS INTERVENTION DESCRIPTION    Heparin was used for anticoagulation, patient was preloaded with Brilinta. A 7 Citizen of the [place] XB 3.5 guiding cath was used to intubate the left main. A choice floppy wire with a Turnpike microcatheter was initially taken however the lesion could not be crossed with this. Several other wires were taken including a cougar XT and PT to moderate support wire. Additional microcatheters were taken but it remained difficult to cross into D1 despite use of a Corsair pro access microcatheter. Ultimately a super cross microcatheter with the PT to moderate support wire was able to be utilized to cross the lesion into D1.   The super cross microcatheter was then removed in favor of the Corsair XS pro microcatheter which was able to be advanced distally. Then the [facility] wire was able to be utilized successfully to cross the distal lesions in the diagonal artery. The Corsair microcatheter was then advanced distally and the [facility] XT wire was removed in favor of a workhorse wire. Lesions were then dilated distally with 2 and 2.25 mm balloons including cutting balloon. IVUS was then performed which revealed a minimal luminal diameter distally of 2 to 2-1/2 mm and more proximally in the diagonal branch of 3 mm. The LAD was felt to be a 4 to 4.5 mm vessel with extensive disease and calcification. As such rotational atherectomy was performed with a 1.5 mm bur from the left main into LAD as well as D1. Lesions were then stented with Abbott Xience Marisel 2.25 x 38 mm drug-eluting stent as well as a 3.0 x 38 mm drug-eluting stent. Stents were postdilated with 2.5, 3.0, 4.0 and 4.5 mm noncompliant balloons from distal to proximal segments. Follow-up IVUS showed good stent apposition/expansion. There was 0% residual stenosis. There was RAY 3 flow before and after PCI. CONCLUSIONS:    Successful rotational atherectomy and PCI of D1 with single drug-eluting stent  Successful rotational atherectomy and PCI of left main/LAD with single drug-eluting stent  Medical therapy for LCx , this lesion does not appear to be amenable for PCI     CABG 9/25/2020:  Urgent coronary artery bypass grafting surgery x3 with a single greater saphenous vein graft to the obtuse marginal branch of the circumflex, separate single greater saphenous vein graft to the first diagonal branch of the LAD, pedicled left internal artery to the LAD. Winfield-Augusto catheter placement. Cardiopulmonary bypass. Endoscopic vein harvesting of the right greater saphenous vein. Transesophageal echo. Epiaortic ultrasound. Doppler verification of grafts. Bilateral five-level intercostal nerve block with Exparel.  Platelet gel application     Lab Reviewed:     Renal Profile:  Lab Results   Component Value Date/Time    CREATININE 2.0 03/15/2023 04:38 AM    BUN 37 03/15/2023 04:38 AM     03/15/2023 04:38 AM    K 3.7 03/15/2023 04:38 AM    K 3.8 03/13/2023 01:00 PM    CL 84 03/15/2023 04:38 AM    CO2 36 03/15/2023 04:38 AM     CBC:    Lab Results   Component Value Date/Time    WBC 12.1 03/15/2023 04:38 AM    RBC 5.34 03/15/2023 04:38 AM    HGB 15.5 03/15/2023 04:38 AM    HCT 47.3 03/15/2023 04:38 AM    MCV 88.6 03/15/2023 04:38 AM    RDW 16.8 03/15/2023 04:38 AM     03/15/2023 04:38 AM     BNP:    Lab Results   Component Value Date/Time    PROBNP 80 03/13/2023 01:00 PM    PROBNP 68 01/06/2023 09:20 AM    PROBNP 54 11/22/2022 10:37 AM    PROBNP 298 09/05/2021 07:19 PM    PROBNP 151 08/23/2021 10:49 AM     Fasting Lipid Panel:    Lab Results   Component Value Date/Time    CHOL 103 03/14/2023 04:30 AM    HDL 42 03/14/2023 04:30 AM    TRIG 197 03/14/2023 04:30 AM     Cardiac Enzymes:  CK/MbTroponin  Lab Results   Component Value Date/Time    TROPONINI <0.01 03/13/2023 08:54 PM     PT/ INR   Lab Results   Component Value Date/Time    INR 1.06 01/24/2023 07:06 AM    INR 1.10 10/19/2020 02:40 PM    INR 1.35 09/26/2020 05:20 AM    PROTIME 13.8 01/24/2023 07:06 AM    PROTIME 12.7 10/19/2020 02:40 PM    PROTIME 15.7 09/26/2020 05:20 AM     PTT No results found for: PTT   Lab Results   Component Value Date/Time    MG 2.70 03/15/2023 04:38 AM      Lab Results   Component Value Date/Time    TSH 1.53 03/13/2023 06:27 PM     All labs and imaging reviewed today    Assessment:  Chronic angina  HFrEF: ongoing  Hypotension  CAD: multiple PCIs and CABG 9/2020 (SVGs known occluded)  - s/p PCI  Ramus with ISREAL, laser atherectomy OM1 instent restenosis 1/2023;   - s/p IRSEAL RCA, PTCA Ramus stent,  PCI OM to LM with ISREAL x4 3/2022;   -s/p shockwave/rotablator/ISREAL RCA and ISREAL Diag 12/2021;   -s/p rotablator/ISREAL LM/LAD and ISREAL Diag 4/2021;   -s/p CABG x3 (SVGs occluded) 9/2020  Ischemic cardiomyopathy: EF 46% on echo 2/2023; EF 45-50% 4/2021  SND: s/p dual chamber ppm 8/2021 with ILR explant  HTN: stable  HLD: stable, LDL 23, statin  History of syncope  COPD  OANH: noncompliant with CPAP  Obesity: weight loss recommended    Plan:   1. Entresto stopped due to DENIA and hypotension  2. Holding toprol, imdur, lasix, spironolactone, SLGT2i due to symptomatic hypotension  3. Ruled out for MI and low suspicion for ACS. Follows with KELBY CHAUDHRY for known ischemic heart disease s/p multiple PCIs and chronic angina. Has appointment with interventionalist next month. 4. Continue aspirin, statin, plavix  5.  Monitor renal function, BP    ANICETO Flores-CNP  [facility], [place]  [phone]

## 2023-03-15 NOTE — PROGRESS NOTES
End of shift report given to [facility], RN. Transfer of care done at this time.

## 2023-03-15 NOTE — PROGRESS NOTES
RT Inhaler-Nebulizer Bronchodilator Protocol Note    There is a bronchodilator order in the chart from a provider indicating to follow the RT Bronchodilator Protocol and there is an Initiate RT Inhaler-Nebulizer Bronchodilator Protocol order as well (see protocol at bottom of note). CXR Findings:  No results found. The findings from the last RT Protocol Assessment were as follows:   History Pulmonary Disease: (P) Chronic pulmonary disease  Respiratory Pattern: (P) Regular pattern and RR 12-20 bpm  Breath Sounds: (P) Slightly diminished and/or crackles  Cough: (P) Strong, spontaneous, non-productive  Indication for Bronchodilator Therapy: (P) On home bronchodilators  Bronchodilator Assessment Score: (P) 4    Aerosolized bronchodilator medication orders have been revised according to the RT Inhaler-Nebulizer Bronchodilator Protocol below. Respiratory Therapist to perform RT Therapy Protocol Assessment initially then follow the protocol. Repeat RT Therapy Protocol Assessment PRN for score 0-3 or on second treatment, BID, and PRN for scores above 3. No Indications - adjust the frequency to every 6 hours PRN wheezing or bronchospasm, if no treatments needed after 48 hours then discontinue using Per Protocol order mode. If indication present, adjust the RT bronchodilator orders based on the Bronchodilator Assessment Score as indicated below. Use Inhaler orders unless patient has one or more of the following: on home nebulizer, not able to hold breath for 10 seconds, is not alert and oriented, cannot activate and use MDI correctly, or respiratory rate 25 breaths per minute or more, then use the equivalent nebulizer order(s) with same Frequency and PRN reasons based on the score. If a patient is on this medication at home then do not decrease Frequency below that used at home.     0-3 - enter or revise RT bronchodilator order(s) to equivalent RT Bronchodilator order with Frequency of every 4 hours PRN for wheezing or increased work of breathing using Per Protocol order mode. 4-6 - enter or revise RT Bronchodilator order(s) to two equivalent RT bronchodilator orders with one order with BID Frequency and one order with Frequency of every 4 hours PRN wheezing or increased work of breathing using Per Protocol order mode. 7-10 - enter or revise RT Bronchodilator order(s) to two equivalent RT bronchodilator orders with one order with TID Frequency and one order with Frequency of every 4 hours PRN wheezing or increased work of breathing using Per Protocol order mode. 11-13 - enter or revise RT Bronchodilator order(s) to one equivalent RT bronchodilator order with QID Frequency and an Albuterol order with Frequency of every 4 hours PRN wheezing or increased work of breathing using Per Protocol order mode. Greater than 13 - enter or revise RT Bronchodilator order(s) to one equivalent RT bronchodilator order with every 4 hours Frequency and an Albuterol order with Frequency of every 2 hours PRN wheezing or increased work of breathing using Per Protocol order mode.          Electronically signed by Henry Viera RCP on 3/15/2023 at 9:11 AM

## 2023-03-15 NOTE — TELEPHONE ENCOUNTER
Fredrick Ellis called our department and shared that he is currently a patient on PCU and will not be in cardiac rehab today. His cardiologist appointment is 3/23/23. Will return once cleared.

## 2023-03-15 NOTE — PROGRESS NOTES
Pt back in room at this time from CT scan. He c/o heartburn, will contact nocturnist regarding issue.

## 2023-03-15 NOTE — PLAN OF CARE
Problem: Discharge Planning  Goal: Discharge to home or other facility with appropriate resources  3/15/2023 1019 by Yonatan Britton RN  Outcome: Progressing  3/15/2023 0746 by Enriqueta Correa RN  Outcome: Progressing     Problem: Safety - Adult  Goal: Free from fall injury  3/15/2023 1019 by Yonatan Britton RN  Outcome: Progressing  3/15/2023 0746 by Enriqueta Correa RN  Outcome: Progressing     Problem: Chronic Conditions and Co-morbidities  Goal: Patient's chronic conditions and co-morbidity symptoms are monitored and maintained or improved  3/15/2023 1019 by Yonatan Britton RN  Outcome: Progressing  3/15/2023 0746 by Enriqueta Correa RN  Outcome: Progressing  Note:   HEART FAILURE CARE PLAN:    Comorbidities Reviewed: Yes   Patient has a past medical history of Acute diverticulitis, Acute kidney injury ([address] Acute on chronic respiratory failure with hypoxemia ([address] Acute respiratory failure ([address] Acute respiratory failure with hypoxia ([address] Anxiety, Aortic valve calcification, CAD in native artery, Chronic obstructive pulmonary disease ([address] Chronic systolic congestive heart failure (HCC), Class 3 severe obesity with body mass index (BMI) of 45.0 to 49.9 in adult [facility]), Coronary artery calcification, Coronary artery disease involving native heart with angina pectoris ([address] Crush injury of right foot, DDD (degenerative disc disease), lumbar, Depression, Diverticulitis of colon, Erectile dysfunction, Fatigue, HNP (herniated nucleus pulposus), lumbar, Hyperglycemia, Hyperlipidemia, Hypersomnia, Hypertension, Insomnia, Ischemic cardiomyopathy, Kidney stone, Lumbar radiculopathy, Lumbar spine pain, LVH (left ventricular hypertrophy), Mobitz type I Wenckebach atrioventricular block, Moderate protein-calorie malnutrition ([address] Observed sleep apnea, OANH (obstructive sleep apnea), Pneumonia, primary atypical, Reactive depression, S/P three vessel coronary artery bypass, Spondylosis without myelopathy or radiculopathy, lumbar region, Tobacco abuse, Tobacco use, and Wears dentures. ECHOCARDIOGRAM Reviewed: Yes   Patient's Ejection Fraction (EF) is greater than 40%    Weights Reviewed: Yes   Admission weight: (!) 302 lb (137 kg)   Wt Readings from Last 3 Encounters:   03/15/23 296 lb 4.8 oz (134.4 kg)   02/16/23 (!) 301 lb (136.5 kg)   02/08/23 298 lb (135.2 kg)     Intake & Output Reviewed: Yes     Intake/Output Summary (Last 24 hours) at 3/15/2023 0746  Last data filed at 3/15/2023 0415  Gross per 24 hour   Intake 1210 ml   Output 675 ml   Net 535 ml     Medications Reviewed: Yes   SCHEDULED HOSPITAL MEDICATIONS:   sacubitril-valsartan  1 tablet Oral BID    furosemide  40 mg Oral Daily    sodium chloride flush  5-40 mL IntraVENous 2 times per day    enoxaparin  30 mg SubCUTAneous BID    insulin lispro  0-4 Units SubCUTAneous TID WC    insulin lispro  0-4 Units SubCUTAneous Nightly    lidocaine  1 patch TransDERmal Daily    amitriptyline  10 mg Oral Nightly    aspirin  81 mg Oral Daily    atorvastatin  80 mg Oral Daily    brexpiprazole  1 mg Oral Daily    clopidogrel  75 mg Oral Daily    empagliflozin  10 mg Oral Daily    [Held by provider] isosorbide mononitrate  120 mg Oral Daily    metoprolol succinate  25 mg Oral Daily    therapeutic multivitamin-minerals  1 tablet Oral Daily    pregabalin  150 mg Oral BID    ranolazine  500 mg Oral BID    rOPINIRole  1 mg Oral Daily    spironolactone  25 mg Oral Daily    budesonide-formoterol  2 puff Inhalation BID    And    tiotropium  2 puff Inhalation Daily    albuterol sulfate HFA  2 puff Inhalation BID     ACE/ARB/ARNI is REQUIRED for EF </= 16% SYSTOLIC FAILURE:   ACE[de-identified] None  ARB[de-identified] None  ARNI[de-identified] Sacubitril/Valsartan-Entresto    Evidenced-Based Beta Blocker is REQUIRED for EF </= 42% SYSTOLIC FAILURE:   [de-identified] Metoprolol SUCCinate- Toprol XL    Diuretics:  [de-identified] Furosemide    Diet Reviewed: Yes   ADULT DIET;  Regular; 4 carb choices (60 gm/meal); 2000 ml    Goal of Care Reviewed: Yes   Patient and/or Family's stated Goal of Care this Admission: reduce shortness of breath, increase activity tolerance, be more comfortable, and reduce lower extremity edema prior to discharge.        Problem: Cardiovascular - Adult  Goal: Maintains optimal cardiac output and hemodynamic stability  3/15/2023 1019 by Amira Cuello RN  Outcome: Progressing  3/15/2023 0746 by Latisha Goodson RN  Outcome: Progressing  Goal: Absence of cardiac dysrhythmias or at baseline  3/15/2023 1019 by Amira Cuello RN  Outcome: Progressing  3/15/2023 0746 by Latisha Goodson RN  Outcome: Progressing     Problem: Pain  Goal: Verbalizes/displays adequate comfort level or baseline comfort level  3/15/2023 1019 by Amira Cuello RN  Outcome: Progressing  3/15/2023 0746 by Latisha Goodson RN  Outcome: Progressing

## 2023-03-15 NOTE — PLAN OF CARE
Problem: Discharge Planning  Goal: Discharge to home or other facility with appropriate resources  Outcome: Progressing     Problem: Safety - Adult  Goal: Free from fall injury  Outcome: Progressing     Problem: Chronic Conditions and Co-morbidities  Goal: Patient's chronic conditions and co-morbidity symptoms are monitored and maintained or improved  Outcome: Progressing  Note:   HEART FAILURE CARE PLAN:    Comorbidities Reviewed: Yes   Patient has a past medical history of Acute diverticulitis, Acute kidney injury ([address] Acute on chronic respiratory failure with hypoxemia ([address] Acute respiratory failure ([address] Acute respiratory failure with hypoxia (HCC), Anxiety, Aortic valve calcification, CAD in native artery, Chronic obstructive pulmonary disease (HCC), Chronic systolic congestive heart failure (HCC), Class 3 severe obesity with body mass index (BMI) of 45.0 to 49.9 in [facility]), Coronary artery calcification, Coronary artery disease involving native heart with angina pectoris ([address] Crush injury of right foot, DDD (degenerative disc disease), lumbar, Depression, Diverticulitis of colon, Erectile dysfunction, Fatigue, HNP (herniated nucleus pulposus), lumbar, Hyperglycemia, Hyperlipidemia, Hypersomnia, Hypertension, Insomnia, Ischemic cardiomyopathy, Kidney stone, Lumbar radiculopathy, Lumbar spine pain, LVH (left ventricular hypertrophy), Mobitz type I Wenckebach atrioventricular block, Moderate protein-calorie malnutrition ([address] Observed sleep apnea, OANH (obstructive sleep apnea), Pneumonia, primary atypical, Reactive depression, S/P three vessel coronary artery bypass, Spondylosis without myelopathy or radiculopathy, lumbar region, Tobacco abuse, Tobacco use, and Wears dentures. ECHOCARDIOGRAM Reviewed: Yes   Patient's Ejection Fraction (EF) is greater than 40%    Weights Reviewed:  Yes   Admission weight: (!) 302 lb (137 kg)   Wt Readings from Last 3 Encounters:   03/15/23 296 lb 4.8 oz (134.4 kg) 02/16/23 (!) 301 lb (136.5 kg)   02/08/23 298 lb (135.2 kg)     Intake & Output Reviewed: Yes     Intake/Output Summary (Last 24 hours) at 3/15/2023 0746  Last data filed at 3/15/2023 0415  Gross per 24 hour   Intake 1210 ml   Output 675 ml   Net 535 ml     Medications Reviewed: Yes   SCHEDULED HOSPITAL MEDICATIONS:   sacubitril-valsartan  1 tablet Oral BID    furosemide  40 mg Oral Daily    sodium chloride flush  5-40 mL IntraVENous 2 times per day    enoxaparin  30 mg SubCUTAneous BID    insulin lispro  0-4 Units SubCUTAneous TID WC    insulin lispro  0-4 Units SubCUTAneous Nightly    lidocaine  1 patch TransDERmal Daily    amitriptyline  10 mg Oral Nightly    aspirin  81 mg Oral Daily    atorvastatin  80 mg Oral Daily    brexpiprazole  1 mg Oral Daily    clopidogrel  75 mg Oral Daily    empagliflozin  10 mg Oral Daily    [Held by provider] isosorbide mononitrate  120 mg Oral Daily    metoprolol succinate  25 mg Oral Daily    therapeutic multivitamin-minerals  1 tablet Oral Daily    pregabalin  150 mg Oral BID    ranolazine  500 mg Oral BID    rOPINIRole  1 mg Oral Daily    spironolactone  25 mg Oral Daily    budesonide-formoterol  2 puff Inhalation BID    And    tiotropium  2 puff Inhalation Daily    albuterol sulfate HFA  2 puff Inhalation BID     ACE/ARB/ARNI is REQUIRED for EF </= 07% SYSTOLIC FAILURE:   ACE[de-identified] None  ARB[de-identified] None  ARNI[de-identified] Sacubitril/Valsartan-Entresto    Evidenced-Based Beta Blocker is REQUIRED for EF </= 88% SYSTOLIC FAILURE:   [de-identified] Metoprolol SUCCinate- Toprol XL    Diuretics:  [de-identified] Furosemide    Diet Reviewed: Yes   ADULT DIET; Regular; 4 carb choices (60 gm/meal); 2000 ml    Goal of Care Reviewed: Yes   Patient and/or Family's stated Goal of Care this Admission: reduce shortness of breath, increase activity tolerance, be more comfortable, and reduce lower extremity edema prior to discharge.        Problem: Cardiovascular - Adult  Goal: Maintains optimal cardiac output and hemodynamic stability  Outcome: Progressing  Goal: Absence of cardiac dysrhythmias or at baseline  Outcome: Progressing     Problem: Pain  Goal: Verbalizes/displays adequate comfort level or baseline comfort level  Outcome: Progressing

## 2023-03-15 NOTE — PROGRESS NOTES
Pt resting in bed watching TV. He was given milk to help with reflux until order from nocturnist received. Pt states milk did help with heartburn. Assessment complete-see flowsheet. Medications given-see MAR. Pt given tums x1 PRN for heartburn as well. He also still c/o pain in right shoulder, ice pack given. He denies further needs, call light and bedside table within reach. Will continue to monitor.

## 2023-03-15 NOTE — CARE COORDINATION
INTERDISCIPLINARY PLAN OF CARE CONFERENCE    Date/Time: 3/15/2023 9:19 AM  Completed by: SHYANN Cruz  Case Management Department  Phone: [phone] Fax: [phone]   Case Management      Patient Name:  Kaitlin Galeas  YOB: 1960  Admitting Diagnosis: Acute on chronic combined systolic and diastolic CHF (congestive heart failure) ([facility] [I50.43]  Chest pain, unspecified type [R07.9]  Acute on chronic congestive heart failure, unspecified heart failure type [facility]) [I50.9]     Admit Date/Time:  3/13/2023 12:46 PM    Chart reviewed. Interdisciplinary team contacted or reviewed plan related to patient progress and discharge plans. Disciplines included Case Management, Nursing, and Dietitian. Current Status:ongoing   PT/OT recommendation for discharge plan of care: n/a    Expected D/C Disposition:  Home  Confirmed plan with patient and/or family Yes confirmed with: (name) pt  Met with:pt    Discharge Plan Comments: Chart review completed. Met with pt at bedside. Pt confirmed he will return home when discharged and denied needing skilled home care for RN/PT/OT. He stated that he is working with Dr. Denney Donn office for a portable concentrator with [employer] which he is aware he will need to continue to work with them. He is aware he will need new testing if needs cont o2 as his orders are currently for night use. Home O2 in place on admit: Yes  Pt informed of need to bring portable home O2 tank on day of discharge for nursing to connect prior to leaving:  Yes  Verbalized agreement/Understanding:   Yes

## 2023-03-15 NOTE — PROGRESS NOTES
Patient education given on fluid limit told sitting at 1500 ml right now for the day-told him almost at limit and then cannot have anything else until am.He said \"thanks fo worrying but go ahead and get me a drink. \" I explained it doesn't work that way and its a restriction once he hits his limit until morning-offered ice but pt declined this states it \"just makes me more thirsty. \"

## 2023-03-16 ENCOUNTER — APPOINTMENT (OUTPATIENT)
Dept: GENERAL RADIOLOGY | Age: 63
DRG: 194 | End: 2023-03-16
Payer: COMMERCIAL

## 2023-03-16 LAB
ANION GAP SERPL CALCULATED.3IONS-SCNC: 12 MMOL/L (ref 3–16)
BASOPHILS # BLD: 0 K/UL (ref 0–0.2)
BASOPHILS NFR BLD: 0.4 %
BUN SERPL-MCNC: 31 MG/DL (ref 7–20)
CALCIUM SERPL-MCNC: 8.9 MG/DL (ref 8.3–10.6)
CHLORIDE SERPL-SCNC: 95 MMOL/L (ref 99–110)
CO2 SERPL-SCNC: 31 MMOL/L (ref 21–32)
CREAT SERPL-MCNC: 1 MG/DL (ref 0.8–1.3)
DEPRECATED RDW RBC AUTO: 16.5 % (ref 12.4–15.4)
EOSINOPHIL # BLD: 0.2 K/UL (ref 0–0.6)
EOSINOPHIL NFR BLD: 1.9 %
GFR SERPLBLD CREATININE-BSD FMLA CKD-EPI: >60 ML/MIN/{1.73_M2}
GLUCOSE BLD-MCNC: 158 MG/DL (ref 70–99)
GLUCOSE BLD-MCNC: 161 MG/DL (ref 70–99)
GLUCOSE BLD-MCNC: 163 MG/DL (ref 70–99)
GLUCOSE BLD-MCNC: 177 MG/DL (ref 70–99)
GLUCOSE SERPL-MCNC: 136 MG/DL (ref 70–99)
HCT VFR BLD AUTO: 44.2 % (ref 40.5–52.5)
HGB BLD-MCNC: 14.7 G/DL (ref 13.5–17.5)
LYMPHOCYTES # BLD: 1.6 K/UL (ref 1–5.1)
LYMPHOCYTES NFR BLD: 18.1 %
MAGNESIUM SERPL-MCNC: 2.7 MG/DL (ref 1.8–2.4)
MCH RBC QN AUTO: 29.9 PG (ref 26–34)
MCHC RBC AUTO-ENTMCNC: 33.4 G/DL (ref 31–36)
MCV RBC AUTO: 89.5 FL (ref 80–100)
MONOCYTES # BLD: 0.8 K/UL (ref 0–1.3)
MONOCYTES NFR BLD: 9.4 %
NEUTROPHILS # BLD: 6.2 K/UL (ref 1.7–7.7)
NEUTROPHILS NFR BLD: 70.2 %
NT-PROBNP SERPL-MCNC: 36 PG/ML (ref 0–124)
PERFORMED ON: ABNORMAL
PLATELET # BLD AUTO: 187 K/UL (ref 135–450)
PMV BLD AUTO: 8.6 FL (ref 5–10.5)
POTASSIUM SERPL-SCNC: 3.7 MMOL/L (ref 3.5–5.1)
RBC # BLD AUTO: 4.93 M/UL (ref 4.2–5.9)
SODIUM SERPL-SCNC: 138 MMOL/L (ref 136–145)
WBC # BLD AUTO: 8.8 K/UL (ref 4–11)

## 2023-03-16 PROCEDURE — 6370000000 HC RX 637 (ALT 250 FOR IP): Performed by: NURSE PRACTITIONER

## 2023-03-16 PROCEDURE — 6370000000 HC RX 637 (ALT 250 FOR IP): Performed by: INTERNAL MEDICINE

## 2023-03-16 PROCEDURE — 2060000000 HC ICU INTERMEDIATE R&B

## 2023-03-16 PROCEDURE — 2580000003 HC RX 258: Performed by: INTERNAL MEDICINE

## 2023-03-16 PROCEDURE — 74018 RADEX ABDOMEN 1 VIEW: CPT

## 2023-03-16 PROCEDURE — 94761 N-INVAS EAR/PLS OXIMETRY MLT: CPT

## 2023-03-16 PROCEDURE — 83735 ASSAY OF MAGNESIUM: CPT

## 2023-03-16 PROCEDURE — 36415 COLL VENOUS BLD VENIPUNCTURE: CPT

## 2023-03-16 PROCEDURE — 2580000003 HC RX 258: Performed by: NURSE PRACTITIONER

## 2023-03-16 PROCEDURE — 94640 AIRWAY INHALATION TREATMENT: CPT

## 2023-03-16 PROCEDURE — 6360000002 HC RX W HCPCS: Performed by: NURSE PRACTITIONER

## 2023-03-16 PROCEDURE — 85025 COMPLETE CBC W/AUTO DIFF WBC: CPT

## 2023-03-16 PROCEDURE — 99232 SBSQ HOSP IP/OBS MODERATE 35: CPT | Performed by: NURSE PRACTITIONER

## 2023-03-16 PROCEDURE — 83880 ASSAY OF NATRIURETIC PEPTIDE: CPT

## 2023-03-16 PROCEDURE — 80048 BASIC METABOLIC PNL TOTAL CA: CPT

## 2023-03-16 PROCEDURE — 99232 SBSQ HOSP IP/OBS MODERATE 35: CPT | Performed by: INTERNAL MEDICINE

## 2023-03-16 PROCEDURE — 2700000000 HC OXYGEN THERAPY PER DAY

## 2023-03-16 RX ADMIN — TIOTROPIUM BROMIDE INHALATION SPRAY 2 PUFF: 3.12 SPRAY, METERED RESPIRATORY (INHALATION) at 07:24

## 2023-03-16 RX ADMIN — HYDROCODONE BITARTRATE AND ACETAMINOPHEN 1 TABLET: 5; 325 TABLET ORAL at 05:15

## 2023-03-16 RX ADMIN — ATORVASTATIN CALCIUM 80 MG: 40 TABLET, FILM COATED ORAL at 20:20

## 2023-03-16 RX ADMIN — METOPROLOL SUCCINATE 25 MG: 25 TABLET, EXTENDED RELEASE ORAL at 09:13

## 2023-03-16 RX ADMIN — PREGABALIN 100 MG: 100 CAPSULE ORAL at 20:20

## 2023-03-16 RX ADMIN — PREGABALIN 100 MG: 100 CAPSULE ORAL at 09:13

## 2023-03-16 RX ADMIN — CLOPIDOGREL BISULFATE 75 MG: 75 TABLET ORAL at 09:14

## 2023-03-16 RX ADMIN — Medication 2 PUFF: at 20:03

## 2023-03-16 RX ADMIN — Medication 2 PUFF: at 07:24

## 2023-03-16 RX ADMIN — AMITRIPTYLINE HYDROCHLORIDE 10 MG: 10 TABLET, FILM COATED ORAL at 20:20

## 2023-03-16 RX ADMIN — SODIUM CHLORIDE: 9 INJECTION, SOLUTION INTRAVENOUS at 00:08

## 2023-03-16 RX ADMIN — MULTIPLE VITAMINS W/ MINERALS TAB 1 TABLET: TAB at 09:14

## 2023-03-16 RX ADMIN — ENOXAPARIN SODIUM 30 MG: 100 INJECTION SUBCUTANEOUS at 20:20

## 2023-03-16 RX ADMIN — ENOXAPARIN SODIUM 30 MG: 100 INJECTION SUBCUTANEOUS at 09:14

## 2023-03-16 RX ADMIN — ASPIRIN 81 MG: 81 TABLET, COATED ORAL at 09:14

## 2023-03-16 RX ADMIN — RANOLAZINE 500 MG: 500 TABLET, FILM COATED, EXTENDED RELEASE ORAL at 20:20

## 2023-03-16 RX ADMIN — RANOLAZINE 500 MG: 500 TABLET, FILM COATED, EXTENDED RELEASE ORAL at 09:14

## 2023-03-16 RX ADMIN — HYDROCODONE BITARTRATE AND ACETAMINOPHEN 1 TABLET: 5; 325 TABLET ORAL at 20:20

## 2023-03-16 RX ADMIN — Medication 10 ML: at 20:21

## 2023-03-16 ASSESSMENT — PAIN DESCRIPTION - ORIENTATION
ORIENTATION: RIGHT
ORIENTATION: RIGHT;LEFT

## 2023-03-16 ASSESSMENT — ENCOUNTER SYMPTOMS
SHORTNESS OF BREATH: 0
GASTROINTESTINAL NEGATIVE: 1

## 2023-03-16 ASSESSMENT — PAIN SCALES - GENERAL
PAINLEVEL_OUTOF10: 7
PAINLEVEL_OUTOF10: 5

## 2023-03-16 ASSESSMENT — PAIN DESCRIPTION - DESCRIPTORS
DESCRIPTORS: STABBING;ACHING
DESCRIPTORS: ACHING

## 2023-03-16 ASSESSMENT — PAIN - FUNCTIONAL ASSESSMENT: PAIN_FUNCTIONAL_ASSESSMENT: ACTIVITIES ARE NOT PREVENTED

## 2023-03-16 ASSESSMENT — PAIN DESCRIPTION - LOCATION
LOCATION: FLANK;SHOULDER
LOCATION: SHOULDER

## 2023-03-16 NOTE — PROGRESS NOTES
IM Progress Note    Admit Date:  3/13/2023    Presented for chest pain, leg swelling, fatigue and weight gain  Admitted for further evaluation and management of acute CHF exacerbation and chest pain rule out. Cardiology consulted  became dizzy and diaphoretic after he had a BM. Holding BP meds  Given IVF yesterday for elevated creatinine         Subjective:    Mr. Bettye Morales seen up in bed, reports minimal change , right flank pain today    Dizziness might be better  BP stable  Good UOP    Held diuresis     Objective:   Patient Vitals for the past 4 hrs:   BP Temp Temp src Pulse Resp SpO2 Weight   03/16/23 0737 104/63 (!) 96.6 °F (35.9 °C) Oral 85 18 -- --   03/16/23 0731 -- -- -- -- -- 93 % --   03/16/23 0500 116/72 97 °F (36.1 °C) -- -- 18 -- 298 lb 9.6 oz (135.4 kg)       Blood pressure was down to the 70s, with 250 mL fluid bolus systolic blood pressure up to 89/50 now. , RR 16, afebrile     Intake/Output Summary (Last 24 hours) at 3/16/2023 0754  Last data filed at 3/16/2023 0500  Gross per 24 hour   Intake 1980 ml   Output 2770 ml   Net -790 ml         Physical Exam:  Gen: ,Patient appears ill , up in bed, morbidly obese   Mild distress. Alert. Appears older than stated age,   Eyes: PERRL. No sclera icterus. No conjunctival injection. ENT: No discharge. Pharynx clear. Neck: No JVD. Trachea midline. Resp: No accessory muscle use. No crackles. No wheezes. No rhonchi. CV:  .  Regular rhythm. No murmur. No rub. +BLE edema. PPM left chest old healed scar   GI: right UQ and flank-tender. +distended. No masses. Normal bowel sounds. Skin: Warm and dry. No nodule on exposed extremities. No rash on exposed extremities. M/S: No cyanosis. No joint deformity. No clubbing. ROM limited to right upper extremity, neurovascularly intact. ++pulses warm to touch   Neuro: Awake. Grossly nonfocal    Psych: Oriented x 3. No anxiety or agitation.        Scheduled Meds:   pregabalin  100 mg Oral BID    [Held by provider] sacubitril-valsartan  1 tablet Oral BID    [Held by provider] furosemide  40 mg Oral Daily    sodium chloride flush  5-40 mL IntraVENous 2 times per day    enoxaparin  30 mg SubCUTAneous BID    insulin lispro  0-4 Units SubCUTAneous TID WC    insulin lispro  0-4 Units SubCUTAneous Nightly    lidocaine  1 patch TransDERmal Daily    amitriptyline  10 mg Oral Nightly    aspirin  81 mg Oral Daily    atorvastatin  80 mg Oral Daily    [Held by provider] brexpiprazole  1 mg Oral Daily    clopidogrel  75 mg Oral Daily    [Held by provider] empagliflozin  10 mg Oral Daily    [Held by provider] isosorbide mononitrate  120 mg Oral Daily    [Held by provider] metoprolol succinate  25 mg Oral Daily    therapeutic multivitamin-minerals  1 tablet Oral Daily    ranolazine  500 mg Oral BID    [Held by provider] spironolactone  25 mg Oral Daily    budesonide-formoterol  2 puff Inhalation BID    And    tiotropium  2 puff Inhalation Daily    albuterol sulfate HFA  2 puff Inhalation BID       Continuous Infusions:   sodium chloride 100 mL/hr at 03/16/23 0008    sodium chloride      dextrose         PRN Meds:  calcium carbonate, sodium chloride flush, sodium chloride, ondansetron **OR** ondansetron, polyethylene glycol, acetaminophen **OR** acetaminophen, potassium chloride **OR** potassium alternative oral replacement **OR** potassium chloride, magnesium sulfate, glucose, dextrose bolus **OR** dextrose bolus, glucagon (rDNA), dextrose, benzonatate, HYDROcodone 5 mg - acetaminophen, albuterol      Data:  CBC:   Recent Labs     03/14/23  0430 03/15/23  0438 03/16/23  0439   WBC 9.7 12.1* 8.8   HGB 16.9 15.5 14.7   HCT 50.6 47.3 44.2   MCV 88.1 88.6 89.5    227 187       BMP:   Recent Labs     03/14/23  0430 03/15/23  0438 03/16/23  0439   * 132* 138   K 3.3* 3.7 3.7   CL 88* 84* 95*   CO2 32 36* 31   BUN 21* 37* 31*   CREATININE 1.0 2.0* 1.0       LIVER PROFILE:   Recent Labs     03/13/23  1300   AST 29   ALT 41* BILITOT 1.3*   ALKPHOS 91       PT/INR: No results for input(s): PROTIME, INR in the last 72 hours. CULTURES  Covid/influenza not detected        RADIOLOGY  CT HEAD WO CONTRAST   Final Result   No acute intracranial abnormality. XR SHOULDER RIGHT (MIN 2 VIEWS)   Final Result   No acute abnormality. CT CHEST PULMONARY EMBOLISM W CONTRAST   Final Result   No evidence of pulmonary embolism. No evidence of thoracic aortic aneurysm or dissection. Evidence of previous CABG. Minimal dependent atelectatic changes at the posterior lung bases   bilaterally. No other focal abnormality or acute process in the lungs. No   evidence of pleural effusion or pneumothorax. Evidence of at least moderate   diffuse fatty infiltration in visualized portion of the liver. No subphrenic acute process demonstrated.              Assessment/Plan:      Acute on Chronic combined CHF    - echo 2/3/2023 showed EF 46% with grade 1 DD  - has been following with [facility] and Lasix was recently transitioned to torsemide and aldactone was added as well as Zaroxolyn weekly   - weight on admission 301--monitor;  dry weight per patient is 296  - BNP normal  - started on IV Lasix 40 mg BID- wt down to 298  - cardiology consulted, pt wanted to stay here at [facility] instead of being transferred to [facility]  - he does cardiac rehab here  - wt improved to 296 but developed elevated creatinine , hypotension and dizziness needing IVF boluses  - held BP meds and diuresis for a day ,will resume slowly at a lower dose   - hard to assess fluid volume as pt is morbidly obese and has number of complaints    - daily weights, low sodium diet, fluid restriction and strict I/O  -Seen by cardiology,     Hypotension and ARF    Sec to multiple BP meds and addition of entresto- dc entresto  Given IVF and improved creatinine   Resume home meds and diuresis as tolerated         CAD  Hx of CABG 9/2020  Chest Pain  Hx of ischemic cardiomyopathy  - serial troponins flat   - no acute EKG changes noted   - d-dimer elevated and CTPA negative for PE  - hx of CABG on 9/2020. S/p cardiac catheterization on 04/2021   - appears patient is on aspirin, Plavix, Toprol, Imdur  - monitor on telemetry  - cardiology consulted - r/o MI and low suspicion for ACS       SSS  S/p PPM on 8/26/2021     Right shoulder pain- appears to be musculoskeletal in nature  - neurovascularly intact  - decreased ROM  -+pulses  - X-ray non-acute   - add Lidocaine patch     Leukocytosis  - likely reactive, procal 0.22   - no s/sx of infection  - monitor CBC     Hyponatremia  - likely 2/2 fluid overload  - 132 --> 134  - monitor with diuresis      Metabolic Syndrome with DM 2   - recently started on Metformin per PCP note  - will hold  - A1c 7.7%  - start SSI  - carb control diet   - monitor      COPD with chronic hypoxic resp failure  OANH   - stable on 4 L   - continue home inhalers    Hx of tobacco abuse     Right flank pain - obtain KUB     Morbid Obesity  - body mass index is 51.72 kg/m². - complicating assessment and treatment. Placing patient at risk for multiple co-morbidities as well as early death and contributing to the patient's presentation. - counseled on weight loss. DVT Prophylaxis: Lovenox  Diet: ADULT DIET;  Regular; 4 carb choices (60 gm/meal); 2000 ml  Code Status: Full Code     Juno Mace MD   3/16/2023

## 2023-03-16 NOTE — FLOWSHEET NOTE
03/16/23 1320   Vital Signs   Temp 97.3 °F (36.3 °C)   Temp Source Oral   Heart Rate 87   Heart Rate Source Monitor   Resp 18   /60   MAP (Calculated) 78   BP Location Left upper arm   BP Method Automatic   Patient Position Semi fowlers   Level of Consciousness 0   MEWS Score 1   Oxygen Therapy   SpO2 94 %   Pulse Oximetry Type Intermittent   O2 Device Nasal cannula   O2 Flow Rate (L/min) 2 L/min   Patient resting. Wife at bedside.  KUB results sent to MD.

## 2023-03-16 NOTE — PROGRESS NOTES
Pt resting in bed watching TV with family at bedside. He c/o pain in bilateral shoulders though states it is much better across his chest.  PRN norco given per [facility]. Assessment complete-see flowsheet. Medications given-see MAR. Informed pt that he is very close to being over his daily FR. Explained he has 2 more cups of water until 0700 when FR restarts for day. Pt agreed to stay within limits and asked if he could have his last cup of water when mid-shift VS completed. Pt denies further needs, call light and bedside table within reach. Will continue to monitor.

## 2023-03-16 NOTE — PROGRESS NOTES
[facility]  Cardiology  Progress Note    Admission date:  3/13/2023    Reason for follow up visit: CHF, known CAD    HPI/CC: Elvin Peng is a 63 y.o. male who presented 3/13/2023 for fatigue and SOB. Ruled out for MI. Treated for acute on chronic CHF and now DENIA. Rhythm has been sinus. Subjective: Feels better today, no longer hypotensive. No dizziness, SOB or chest pain. Still feels fatigued. Vitals:  Blood pressure 114/60, pulse 87, temperature 97.3 °F (36.3 °C), temperature source Oral, resp. rate 18, height 5' 4\" (1.626 m), weight 298 lb 9.6 oz (135.4 kg), SpO2 94 %.   Temp  Av °F (36.1 °C)  Min: 96.6 °F (35.9 °C)  Max: 97.3 °F (36.3 °C)  Pulse  Av.6  Min: 80  Max: 91  BP  Min: 104/63  Max: 133/70  SpO2  Av %  Min: 93 %  Max: 95 %    24 hour I/O    Intake/Output Summary (Last 24 hours) at 3/16/2023 1519  Last data filed at 3/16/2023 1025  Gross per 24 hour   Intake 1152 ml   Output 2600 ml   Net -1448 ml       Current Facility-Administered Medications   Medication Dose Route Frequency Provider Last Rate Last Admin    pregabalin (LYRICA) capsule 100 mg  100 mg Oral BID Daryl Baugh MD   100 mg at 23 0913    [Held by provider] sacubitril-valsartan (ENTRESTO) 24-26 MG per tablet 1 tablet  1 tablet Oral BID Sandi aCsey MD   1 tablet at 23 5231    [Held by provider] furosemide (LASIX) tablet 40 mg  40 mg Oral Daily Padmaja Del Cid MD        calcium carbonate (TUMS) chewable tablet 500 mg  500 mg Oral TID PRN Pat Ramachandran MD   500 mg at 23 2324    sodium chloride flush 0.9 % injection 5-40 mL  5-40 mL IntraVENous 2 times per day ANICETO Scherer - CNP   5 mL at 03/15/23 0927    sodium chloride flush 0.9 % injection 5-40 mL  5-40 mL IntraVENous PRN ANICETO Scherer CNP        0.9 % sodium chloride infusion   IntraVENous PRN ANICETO Scherer CNP        ondansetron (ZOFRAN-ODT) disintegrating tablet 4 mg  4 mg Oral Q8H PRN ANICETO Scherer CNP Or    ondansetron (ZOFRAN) injection 4 mg  4 mg IntraVENous Q6H PRN Jaswinder Bellow, APRN - CNP   4 mg at 03/14/23 1730    polyethylene glycol (GLYCOLAX) packet 17 g  17 g Oral Daily PRN Jaswinder Bellow, APRN - CNP        acetaminophen (TYLENOL) tablet 650 mg  650 mg Oral Q6H PRN Jaswinder Bellow, APRN - CNP        Or    acetaminophen (TYLENOL) suppository 650 mg  650 mg Rectal Q6H PRN Jaswinder Bellow, APRN - CNP        enoxaparin Sodium (LOVENOX) injection 30 mg  30 mg SubCUTAneous BID Jaswinder Bellow, APRN - CNP   30 mg at 03/16/23 0914    potassium chloride (KLOR-CON M) extended release tablet 40 mEq  40 mEq Oral PRN Jaswinder Bellow, APRN - CNP        Or    potassium bicarb-citric acid (EFFER-K) effervescent tablet 40 mEq  40 mEq Oral PRN Jaswinder Bellow, APRN - CNP        Or    potassium chloride 10 mEq/100 mL IVPB (Peripheral Line)  10 mEq IntraVENous PRN Jaswinder Bellow, APRN - CNP        magnesium sulfate 2000 mg in 50 mL IVPB premix  2,000 mg IntraVENous PRN Jaswinder Bellow, APRN - CNP        insulin lispro (HUMALOG) injection vial 0-4 Units  0-4 Units SubCUTAneous TID WC Jaswinder Bellow, APRN - CNP   1 Units at 03/15/23 1134    insulin lispro (HUMALOG) injection vial 0-4 Units  0-4 Units SubCUTAneous Nightly Jaswinder Bellow, APRN - CNP        glucose chewable tablet 16 g  4 tablet Oral PRN Jaswinder Bellow, APRN - CNP        dextrose bolus 10% 125 mL  125 mL IntraVENous PRN Jaswinder Bellow, APRN - CNP        Or    dextrose bolus 10% 250 mL  250 mL IntraVENous PRN Jaswinder Bellow, APRN - CNP        glucagon (rDNA) injection 1 mg  1 mg SubCUTAneous PRN Jaswinder Bellow, APRN - CNP        dextrose 10 % infusion   IntraVENous Continuous PRN Jaswinder Bellow, APRN - CNP        lidocaine 4 % external patch 1 patch  1 patch TransDERmal Daily Jaswinder Bellow, APRN - CNP   1 patch at 03/16/23 0914    amitriptyline (ELAVIL) tablet 10 mg  10 mg Oral Nightly Jaswinder Bellow, APRN - CNP   10 mg at 03/15/23 2047    aspirin EC tablet 81 mg  81 mg Oral Daily Jaswinder Bellow, APRN - CNP   81 mg at 03/16/23 4993 atorvastatin (LIPITOR) tablet 80 mg  80 mg Oral Daily ANICETO Ramsay CNP   80 mg at 03/15/23 2047    benzonatate (TESSALON) capsule 100 mg  100 mg Oral TID PRN ANICETO Ramsay CNP        clopidogrel (PLAVIX) tablet 75 mg  75 mg Oral Daily ANICETO Ramsay CNP   75 mg at 03/16/23 0914    [Held by provider] empagliflozin (JARDIANCE) tablet 10 mg  10 mg Oral Daily ANICETO Ramasy CNP   10 mg at 03/15/23 0930    [Held by provider] isosorbide mononitrate (IMDUR) extended release tablet 120 mg  120 mg Oral Daily ANICETO Ramsay CNP   120 mg at 03/14/23 0834    metoprolol succinate (TOPROL XL) extended release tablet 25 mg  25 mg Oral Daily ANICETO Ramsay CNP   25 mg at 03/16/23 0913    therapeutic multivitamin-minerals 1 tablet  1 tablet Oral Daily ANICETO Ramsay CNP   1 tablet at 03/16/23 0914    ranolazine (RANEXA) extended release tablet 500 mg  500 mg Oral BID ANICETO Ramsay CNP   500 mg at 03/16/23 0914    [Held by provider] spironolactone (ALDACTONE) tablet 25 mg  25 mg Oral Daily ANICETO Ramsay CNP   25 mg at 03/14/23 0833    HYDROcodone-acetaminophen (NORCO) 5-325 MG per tablet 1 tablet  1 tablet Oral Q8H PRN ANICETO Ramsay CNP   1 tablet at 03/16/23 0515    budesonide-formoterol (SYMBICORT) 160-4.5 MCG/ACT inhaler 2 puff  2 puff Inhalation BID ANICETO Ramsay CNP   2 puff at 03/16/23 0724    And    tiotropium (SPIRIVA RESPIMAT) 2.5 MCG/ACT inhaler 2 puff  2 puff Inhalation Daily ANICETO Ramsay CNP   2 puff at 03/16/23 0724    albuterol sulfate HFA (PROVENTIL;VENTOLIN;PROAIR) 108 (90 Base) MCG/ACT inhaler 2 puff  2 puff Inhalation BID Vikas Griffith MD   2 puff at 03/16/23 0724    albuterol (PROVENTIL) nebulizer solution 2.5 mg  2.5 mg Nebulization Q4H PRN Vikas Griffith MD         Review of Systems   Constitutional:  Positive for fatigue. Respiratory:  Negative for shortness of breath. Cardiovascular:  Negative for chest pain. Gastrointestinal: Negative. Neurological:  Positive for weakness. Negative for dizziness. Objective:     Telemetry monitor: SR    Physical Exam:  Constitutional:  Comfortable and alert, NAD, appears older than stated age, fatigued, obese  Eyes: PERRL, sclera nonicteric  Neck:  Supple, no masses, no thyroidmegaly, no JVD  Skin:  Warm and dry; no rash or lesions  Heart:  Regular, normal apex, S1 and S2 normal, no M/G/R  Lungs:  Normal respiratory effort; clear; no wheezing/rhonchi/rales  Abdomen: soft, non tender, + bowel sounds  Extremities:  +BLE edema  Neuro: alert and oriented, moves legs and arms equally, normal mood and affect    Data Reviewed:    Echo 2/2023:  - Left ventricle: The cavity size is normal. Left ventricular     geometry shows evidence of concentric remodeling, with normal     ventricular mass and mildly increased relative wall thickness. Systolic function was mildly reduced. The calculated ejection     fraction was 46%. Wall motion was normal; there were no regional     wall motion abnormalities. Doppler parameters are consistent with     abnormal left ventricular relaxation (grade 1 diastolic     dysfunction). The stroke volume is 76ml. The stroke index is     32ml/m^2. - Aortic valve: The peak systolic gradient is 8mm Hg.   - Mitral valve: The annulus is mildly calcified. The leaflets are     mildly thickened. Coronary angiogram 1/25/2023:  · Successful  PCI of the ramus using antegrade wiring and receiving a   new 2.5 x 18 mm Xience Skypoint drug-eluting stent at the distal area of   occlusion. · Successful laser atherectomy of InStent restenoses of OM1 with   aggressive angioplasty performed but no new stent later added. · Elevated LVEDP   · Dual antiplatelet therapy as part of guideline directed medical therapy   for ischemic heart disease   · If restenosis occurs, would consider enrollment in drug coated balloon   trial or brachytherapy     Coronary angiogram 12/22/2021:   Intermediate risk abnormal stress test  PROCEDURES PERFORMED    Left heart catheterization  LVgram  Aortogram  Coronary angiogam  Coronary cath  LIMA angiogram  Monitoring of moderate conscious sedation  IVUS of D1  PCI of D1 with single drug-eluting stent  IVUS of RCA  Rotational atherectomy of RCA  Shockwave coronary lithotripsy of RCA  PCI of RCA with single drug-eluting stent  PROCEDURE DESCRIPTION   Risks/benefits/alternatives/outcomes were discussed with patient and/or family and informed consent was obtained. Using the [facility] scale, the patient's right radial artery was found to be a level B. Patient was prepped draped in the usual sterile fashion. Local anaesthetic was applied over puncture sites. Using ultrasound guidance, unsuccessful attempt was made at right radial artery cannulation, and as such using ultrasound and fluoroscopic technique, attention turned towards the right groin and a 5 Palauan sheath was inserted into the right femoral artery. Diagnostic 5 Palauan pigtail, JL 4.5, 3 DRC catheters used for diagnostic angiography. Attention then turned towards PCI as noted below. At end of procedure, the interventional sheath was sutured in place for later removal.    There were no immediate complications. I supervised sedation from 9:15 AM to 11:45 AM with versed 9 mg/fentanyl 300 mcg during the procedure. An independent trained observer pushed meds at my direction. We monitored the patient's level of consciousness and vital signs/physiologic status throughout the procedure duration (see times listed previously). 285 cc contrast was utilized. <20cc EBL  FINDINGS      LVEDP  10   GRADIENT ACROSS AORTIC VALVE  none   LV FUNCTION EF 55-60%   WALL MOTION Subtle mid inferior hypokinesis.    MITRAL REGURGITATION  mild      CALIBER  mildly increased in size   AORTIC INSUFFICIENCY  minimal   BYPASS GRAFTS  no bypass grafts visualized      LM [id number] less than 10% stenosis, there is a stent noted in the mid to distal left main that is widely patent. LAD Proximal-mid 80% stenosis, there is mid-distal nonvisualization of vessel due to bidirectional flow from the LIMA graft. LCX  Calcified, ostial 85% stenosis, proximal-mid 20% stenosis. OM 1 has 100% [id number] . There are well-developed right to left collaterals as well as some left to left collaterals. RCA Large vessel, dominant, calcified, mild to moderately tortuous, proximal-mid 75% stenosis. Distal 50 to 60% stenosis. PDA is a medium to large size vessel with proximal-mid 60-70% stenosis. LIMA-LAD  widely patent, there is less than 10% proximal-mid stenosis, however distally at the anastomosis there is a 60% stenosis. SVG-D1  known to be occluded, not injected         SVG-OM  known to be occluded, not injected            PERCUTANEOUS INTERVENTION DESCRIPTION    Bivalirudin was used for anticoagulation, at the end of the procedure, patient was given oral loading dose of Plavix as well as a single bolus of Integrilin. The initial 5 Yoruba groin sheath was upsized to a 6 Western Janis Terumo 45 cm destination sheath. A 6 Yoruba VL 3.5 guiding cath was used to intubate the left main. A run-through wire was used to cross the lesions in D1 and the lesions of D1 were assessed with IVUS which showed severe in-stent restenosis with a minimal luminal diameter of 2.75 to 3 mm. As such lesions were treated with a 2.75 mm cutting balloon as well as a 3 mm noncompliant balloon and then were stented with a Medtronic resolute [place] 2.75 x 18 mm drug-eluting stent. Stent was postdilated with 3 mm noncompliant balloon. Follow-up IVUS showed good stent apposition/expansion, there was no residual dissection/thrombus noted. As such, attention turned towards the RCA and the initial 6 Yoruba Terumo destination sheath was upsized to a 7 Western Janis 45 cm Terumo destination sheath in the right groin.   Then a [address] guiding cath was used to intubate the RCA. A run-through wire was used to cross the lesions in the RCA and IVUS was performed of the RCA which showed extensive calcification in the proximal-mid vessel with 75% stenoses. As such a Rotafloppy wire was taken and that was used to cross the lesions and the run-through wire was removed. Rotational atherectomy was then performed with a 1.5 mm angela. Shockwave coronary lithotripsy was then performed with a 4 mm balloon. Lesion was then stented with assistance of 7 Intercasting guide extension catheter with a Medtronic resolute [place] 4 x 34 mm drug-eluting stent and stent was postdilated with a 4 mm noncompliant balloon and additionally in the proximal segment with a 5 mm noncompliant balloon. Follow-up IVUS showed good stent apposition/expansion. There was no residual dissection/thrombus noted. There was mild to moderate disease noted in the mid-distal vessel beyond the stented segment and this was felt to be best treated medically. During the procedure, patient was restless and required redirection as well as increased sedation. Groin site remained stable throughout the case, overall, hemodynamics and EKG were stable although during the procedure he did have to be supported with phenylephrine but this was ultimately able to be weaned at the end of the procedure. CONCLUSIONS:    Successful PCI of D1 with single drug-eluting stent   Successful rotational atherectomy of RCA  Successful shockwave coronary lithotripsy of RCA  Successful PCI of RCA with single drug-eluting stent   Consider staged PCI of LIMA to LAD   Consider referral to [facility] Dr Mitra Littlejohn for consideration of LCx  PCI     Echo 12/2021:  Technically difficult examination secondary to habitus. LV systolic function is normal with EF estimated at 55%. Endocardium not entirely well visualized but no obvious segmental wall   motion abnormalities. There is mild concentric left ventricular hypertrophy.    Normal diastolic function with normal LV filling pressure. Mild mitral annular calcification. Aortic valve appears sclerotic but opens adequately. Individual aortic valve leaflets are not clearly visualized. Cannot r/o   bicuspid valve. Coronary angiogram 4/2021:  Unstable angina  PROCEDURES PERFORMED    Left heart catheterization  Coronary angiogam  Coronary cath  Temporary transvenous pacer insertion and removal  Insertion of short term external heart assist system into heart, intraoperative, percutaneous approach  Assistance with cardiac output using impeller pump, continuous  IVUS of left main  IVUS of LAD  IVUS of D1  Rotational atherectomy of left main  Rotational atherectomy of LAD  Rotational atherectomy of D1  PCI of D1 with single drug-eluting stent  PCI of left main/LAD with single drug-eluting stent  PROCEDURE DESCRIPTION   Risks/benefits/alternatives/outcomes were discussed with patient and/or family and informed consent was obtained. Using the [facility] scale, the patient's right radial artery was found to be a level B. Patient was prepped draped in the usual sterile fashion. Local anaesthetic was applied over puncture site. Using a back wall technique and ultrasound guidance, a 6 Chinese Terumo sheath was inserted into right radial artery. Verapamil, nitroglycerin, nicardipine were administered through the sheath. Using fluoroscopic and ultrasound guidance, an 8 Bermudian sheath was inserted into the right common femoral vein. A temporary transvenous pacer was inserted and capture was demonstrated and this was placed in backup mode during the procedure. Temporary transvenous pacer was removed at the end of the case and the venous sheath was sutured in place for later removal.  Using similar technique, a 6 Bermudian sheath was placed into the left common femoral artery.   A single Perclose device was then inserted using preclosed technique and then the site was dilated with 10 and 12 Western Janis dilators and a an Impella CP sheath was placed. Using a pigtail catheter, the aortic valve was crossed for left heart catheterization. Then the 0.018 inch Impella wire was advanced into the left ventricle and then the pigtail was exchanged for the Impella catheter. Impella was used during the procedure and was able to be weaned and removed at the end of the procedure. Using a 6 Scottish 110 cm Terumo sheath along with a 10 mm balloon, a dry close technique was employed along with the single Perclose to close the left common femoral artery arteriotomy. At the conclusion of the procedure, a TR band was placed over the puncture site and hemostasis was obtained. There were no immediate complications. Sedation was provided by the anesthesiology service. 325 cc contrast was utilized. <50cc EBL. LVEDP  5    See diagnostic catheterization report for full details, would add that there is severe tortuosity and eccentric plaque within the left main, LAD and D1 which proximal to mid was 75% and distally was 99%. PERCUTANEOUS INTERVENTION DESCRIPTION    Heparin was used for anticoagulation, patient was preloaded with Brilinta. A 7 Scottish XB 3.5 guiding cath was used to intubate the left main. A choice floppy wire with a Turnpike microcatheter was initially taken however the lesion could not be crossed with this. Several other wires were taken including a cougar XT and PT to moderate support wire. Additional microcatheters were taken but it remained difficult to cross into D1 despite use of a Corsair pro access microcatheter. Ultimately a super cross microcatheter with the PT to moderate support wire was able to be utilized to cross the lesion into D1. The super cross microcatheter was then removed in favor of the Corsair XS pro microcatheter which was able to be advanced distally. Then the [facility] wire was able to be utilized successfully to cross the distal lesions in the diagonal artery.   The Corsair microcatheter was then advanced distally and the [facility] wire was removed in favor of a workhorse wire. Lesions were then dilated distally with 2 and 2.25 mm balloons including cutting balloon. IVUS was then performed which revealed a minimal luminal diameter distally of 2 to 2-1/2 mm and more proximally in the diagonal branch of 3 mm. The LAD was felt to be a 4 to 4.5 mm vessel with extensive disease and calcification. As such rotational atherectomy was performed with a 1.5 mm bur from the left main into LAD as well as D1. Lesions were then stented with Abbott Xience Marisel 2.25 x 38 mm drug-eluting stent as well as a 3.0 x 38 mm drug-eluting stent. Stents were postdilated with 2.5, 3.0, 4.0 and 4.5 mm noncompliant balloons from distal to proximal segments. Follow-up IVUS showed good stent apposition/expansion. There was 0% residual stenosis. There was RAY 3 flow before and after PCI. CONCLUSIONS:    Successful rotational atherectomy and PCI of D1 with single drug-eluting stent  Successful rotational atherectomy and PCI of left main/LAD with single drug-eluting stent  Medical therapy for LCx , this lesion does not appear to be amenable for PCI     CABG 9/25/2020:  Urgent coronary artery bypass grafting surgery x3 with a single greater saphenous vein graft to the obtuse marginal branch of the circumflex, separate single greater saphenous vein graft to the first diagonal branch of the LAD, pedicled left internal artery to the LAD. Chardon-Augusto catheter placement. Cardiopulmonary bypass. Endoscopic vein harvesting of the right greater saphenous vein. Transesophageal echo. Epiaortic ultrasound. Doppler verification of grafts. Bilateral five-level intercostal nerve block with Exparel.  Platelet gel application     Lab Reviewed:     Renal Profile:  Lab Results   Component Value Date/Time    CREATININE 1.0 03/16/2023 04:39 AM    BUN 31 03/16/2023 04:39 AM     03/16/2023 04:39 AM    K 3.7 03/16/2023 04:39 AM    K 3.8 03/13/2023 01:00 PM    CL 95 03/16/2023 04:39 AM    CO2 31 03/16/2023 04:39 AM     CBC:    Lab Results   Component Value Date/Time    WBC 8.8 03/16/2023 04:39 AM    RBC 4.93 03/16/2023 04:39 AM    HGB 14.7 03/16/2023 04:39 AM    HCT 44.2 03/16/2023 04:39 AM    MCV 89.5 03/16/2023 04:39 AM    RDW 16.5 03/16/2023 04:39 AM     03/16/2023 04:39 AM     BNP:    Lab Results   Component Value Date/Time    PROBNP 36 03/16/2023 04:39 AM    PROBNP 80 03/13/2023 01:00 PM    PROBNP 68 01/06/2023 09:20 AM    PROBNP 54 11/22/2022 10:37 AM    PROBNP 298 09/05/2021 07:19 PM     Fasting Lipid Panel:    Lab Results   Component Value Date/Time    CHOL 103 03/14/2023 04:30 AM    HDL 42 03/14/2023 04:30 AM    TRIG 197 03/14/2023 04:30 AM     Cardiac Enzymes:  CK/MbTroponin  Lab Results   Component Value Date/Time    TROPONINI <0.01 03/13/2023 08:54 PM     PT/ INR   Lab Results   Component Value Date/Time    INR 1.06 01/24/2023 07:06 AM    INR 1.10 10/19/2020 02:40 PM    INR 1.35 09/26/2020 05:20 AM    PROTIME 13.8 01/24/2023 07:06 AM    PROTIME 12.7 10/19/2020 02:40 PM    PROTIME 15.7 09/26/2020 05:20 AM     PTT No results found for: PTT   Lab Results   Component Value Date/Time    MG 2.70 03/16/2023 04:39 AM      Lab Results   Component Value Date/Time    TSH 1.53 03/13/2023 06:27 PM     All labs and imaging reviewed today    Assessment:  Chronic angina  HFrEF: ongoing  Hypotension: improved  CAD: multiple PCIs and CABG 9/2020 (SVGs known occluded)  - s/p PCI  Ramus with ISREAL, laser atherectomy OM1 instent restenosis 1/2023;   - s/p ISREAL RCA, PTCA Ramus stent,  PCI OM to LM with ISREAL x4 3/2022;   -s/p shockwave/rotablator/ISREAL RCA and ISREAL Diag 12/2021;   -s/p rotablator/ISREAL LM/LAD and ISREAL Diag 4/2021;   -s/p CABG x3 (SVGs occluded) 9/2020  Ischemic cardiomyopathy: EF 46% on echo 2/2023; EF 45-50% 4/2021  SND: s/p dual chamber ppm 8/2021 with ILR explant  HTN: stable  HLD: stable, LDL 23, statin  History of syncope  COPD  OANH: noncompliant with CPAP  Obesity: weight loss recommended    Plan:   1. Entresto stopped due to DENIA and hypotension; would not restart  2. Restart toprol and imdur at lower doses  3. Lasix, spironolactone; SGLT2i held due to DENIA and symptomatic hypotension; restart once BP/renal function ensured stable  4. Ruled out for MI and low suspicion for ACS. Follows with [place] [address] for known ischemic heart disease s/p multiple PCIs and chronic angina. Has appointment with interventionalist next month. 5. Continue aspirin, statin, plavix  6. Monitor renal function, BP  7. Cardiology will sign off, please call if needed. He has follow up with [place] [address] cardiology.     Yessy Sanders, APRN-CNP  [facility] [place]  [phone]

## 2023-03-16 NOTE — FLOWSHEET NOTE
03/16/23 0737   Vital Signs   Temp (!) 96.6 °F (35.9 °C)   Temp Source Oral   Heart Rate 85   Heart Rate Source Monitor   Resp 18   /63   MAP (Calculated) 77   BP Location Left lower arm   Level of Consciousness 0   MEWS Score 1   Oxygen Therapy   O2 Device Nasal cannula   O2 Flow Rate (L/min) 4 L/min   Patient is resting showing no s/s of distress. Patient is alert and oriented. Meds were given, see MAR. Patient is denying any needs. Bed is in lowest position and call light is within reach. Will continue to monitor. Shift assessment complete, see flowsheets. IVF stopped at this time.  OK to come off tele for shower per MD

## 2023-03-16 NOTE — PROGRESS NOTES
RT Inhaler-Nebulizer Bronchodilator Protocol Note    There is a bronchodilator order in the chart from a provider indicating to follow the RT Bronchodilator Protocol and there is an Initiate RT Inhaler-Nebulizer Bronchodilator Protocol order as well (see protocol at bottom of note). CXR Findings:  No results found. The findings from the last RT Protocol Assessment were as follows:   History Pulmonary Disease: (P) Chronic pulmonary disease  Respiratory Pattern: (P) Regular pattern and RR 12-20 bpm  Breath Sounds: (P) Slightly diminished and/or crackles  Cough: (P) Strong, spontaneous, non-productive  Indication for Bronchodilator Therapy: (P) On home bronchodilators  Bronchodilator Assessment Score: (P) 4    Aerosolized bronchodilator medication orders have been revised according to the RT Inhaler-Nebulizer Bronchodilator Protocol below. Respiratory Therapist to perform RT Therapy Protocol Assessment initially then follow the protocol. Repeat RT Therapy Protocol Assessment PRN for score 0-3 or on second treatment, BID, and PRN for scores above 3. No Indications - adjust the frequency to every 6 hours PRN wheezing or bronchospasm, if no treatments needed after 48 hours then discontinue using Per Protocol order mode. If indication present, adjust the RT bronchodilator orders based on the Bronchodilator Assessment Score as indicated below. Use Inhaler orders unless patient has one or more of the following: on home nebulizer, not able to hold breath for 10 seconds, is not alert and oriented, cannot activate and use MDI correctly, or respiratory rate 25 breaths per minute or more, then use the equivalent nebulizer order(s) with same Frequency and PRN reasons based on the score. If a patient is on this medication at home then do not decrease Frequency below that used at home.     0-3 - enter or revise RT bronchodilator order(s) to equivalent RT Bronchodilator order with Frequency of every 4 hours PRN for wheezing or increased work of breathing using Per Protocol order mode. 4-6 - enter or revise RT Bronchodilator order(s) to two equivalent RT bronchodilator orders with one order with BID Frequency and one order with Frequency of every 4 hours PRN wheezing or increased work of breathing using Per Protocol order mode. 7-10 - enter or revise RT Bronchodilator order(s) to two equivalent RT bronchodilator orders with one order with TID Frequency and one order with Frequency of every 4 hours PRN wheezing or increased work of breathing using Per Protocol order mode. 11-13 - enter or revise RT Bronchodilator order(s) to one equivalent RT bronchodilator order with QID Frequency and an Albuterol order with Frequency of every 4 hours PRN wheezing or increased work of breathing using Per Protocol order mode. Greater than 13 - enter or revise RT Bronchodilator order(s) to one equivalent RT bronchodilator order with every 4 hours Frequency and an Albuterol order with Frequency of every 2 hours PRN wheezing or increased work of breathing using Per Protocol order mode.          Electronically signed by Azeb Hopper RCP on 3/16/2023 at 7:49 AM

## 2023-03-16 NOTE — PLAN OF CARE
Problem: Discharge Planning  Goal: Discharge to home or other facility with appropriate resources  Outcome: Progressing     Problem: Safety - Adult  Goal: Free from fall injury  Outcome: Progressing     Problem: Chronic Conditions and Co-morbidities  Goal: Patient's chronic conditions and co-morbidity symptoms are monitored and maintained or improved  Outcome: Progressing  Note:   HEART FAILURE CARE PLAN:    Comorbidities Reviewed: Yes   Patient has a past medical history of Acute diverticulitis, Acute kidney injury ([address] Acute on chronic respiratory failure with hypoxemia ([address] Acute respiratory failure ([address] Acute respiratory failure with hypoxia (HCC), Anxiety, Aortic valve calcification, CAD in native artery, Chronic obstructive pulmonary disease (HCC), Chronic systolic congestive heart failure (HCC), Class 3 severe obesity with body mass index (BMI) of 45.0 to 49.9 in [facility]), Coronary artery calcification, Coronary artery disease involving native heart with angina pectoris ([address] Crush injury of right foot, DDD (degenerative disc disease), lumbar, Depression, Diverticulitis of colon, Erectile dysfunction, Fatigue, HNP (herniated nucleus pulposus), lumbar, Hyperglycemia, Hyperlipidemia, Hypersomnia, Hypertension, Insomnia, Ischemic cardiomyopathy, Kidney stone, Lumbar radiculopathy, Lumbar spine pain, LVH (left ventricular hypertrophy), Mobitz type I Wenckebach atrioventricular block, Moderate protein-calorie malnutrition ([address] Observed sleep apnea, OANH (obstructive sleep apnea), Pneumonia, primary atypical, Reactive depression, S/P three vessel coronary artery bypass, Spondylosis without myelopathy or radiculopathy, lumbar region, Tobacco abuse, Tobacco use, and Wears dentures. ECHOCARDIOGRAM Reviewed: Yes   Patient's Ejection Fraction (EF) is greater than 40%    Weights Reviewed:  Yes   Admission weight: (!) 302 lb (137 kg)   Wt Readings from Last 3 Encounters:   03/16/23 298 lb 9.6 oz (135.4 kg)   02/16/23 (!) 301 lb (136.5 kg)   02/08/23 298 lb (135.2 kg)     Intake & Output Reviewed: Yes     Intake/Output Summary (Last 24 hours) at 3/16/2023 0622  Last data filed at 3/16/2023 0500  Gross per 24 hour   Intake 1980 ml   Output 2770 ml   Net -790 ml     Medications Reviewed: Yes   SCHEDULED HOSPITAL MEDICATIONS:   pregabalin  100 mg Oral BID    [Held by provider] sacubitril-valsartan  1 tablet Oral BID    [Held by provider] furosemide  40 mg Oral Daily    sodium chloride flush  5-40 mL IntraVENous 2 times per day    enoxaparin  30 mg SubCUTAneous BID    insulin lispro  0-4 Units SubCUTAneous TID WC    insulin lispro  0-4 Units SubCUTAneous Nightly    lidocaine  1 patch TransDERmal Daily    amitriptyline  10 mg Oral Nightly    aspirin  81 mg Oral Daily    atorvastatin  80 mg Oral Daily    [Held by provider] brexpiprazole  1 mg Oral Daily    clopidogrel  75 mg Oral Daily    [Held by provider] empagliflozin  10 mg Oral Daily    [Held by provider] isosorbide mononitrate  120 mg Oral Daily    [Held by provider] metoprolol succinate  25 mg Oral Daily    therapeutic multivitamin-minerals  1 tablet Oral Daily    ranolazine  500 mg Oral BID    [Held by provider] spironolactone  25 mg Oral Daily    budesonide-formoterol  2 puff Inhalation BID    And    tiotropium  2 puff Inhalation Daily    albuterol sulfate HFA  2 puff Inhalation BID     ACE/ARB/ARNI is REQUIRED for EF </= 40% SYSTOLIC FAILURE:   ACE:: None  ARB:: None  ARNI:: Sacubitril/Valsartan-Entresto    Evidenced-Based Beta Blocker is REQUIRED for EF </= 40% SYSTOLIC FAILURE:   :: Metoprolol SUCCinate- Toprol XL    Diuretics:  :: Furosemide    Diet Reviewed: Yes   ADULT DIET; Regular; 4 carb choices (60 gm/meal); 2000 ml    Goal of Care Reviewed: Yes   Patient and/or Family's stated Goal of Care this Admission: reduce shortness of breath, increase activity tolerance, be more comfortable, and reduce lower extremity edema prior to discharge.       Problem:  Cardiovascular - Adult  Goal: Maintains optimal cardiac output and hemodynamic stability  Outcome: Progressing  Goal: Absence of cardiac dysrhythmias or at baseline  Outcome: Progressing     Problem: Pain  Goal: Verbalizes/displays adequate comfort level or baseline comfort level  Outcome: Progressing

## 2023-03-17 ENCOUNTER — HOSPITAL ENCOUNTER (OUTPATIENT)
Dept: CARDIAC REHAB | Age: 63
Setting detail: THERAPIES SERIES
Discharge: HOME | End: 2023-03-17
Payer: COMMERCIAL

## 2023-03-17 VITALS
TEMPERATURE: 97.7 F | BODY MASS INDEX: 50.91 KG/M2 | SYSTOLIC BLOOD PRESSURE: 124 MMHG | WEIGHT: 298.2 LBS | OXYGEN SATURATION: 95 % | RESPIRATION RATE: 20 BRPM | HEART RATE: 86 BPM | HEIGHT: 64 IN | DIASTOLIC BLOOD PRESSURE: 70 MMHG

## 2023-03-17 LAB
ANION GAP SERPL CALCULATED.3IONS-SCNC: 10 MMOL/L (ref 3–16)
BUN SERPL-MCNC: 21 MG/DL (ref 7–20)
CALCIUM SERPL-MCNC: 9.1 MG/DL (ref 8.3–10.6)
CHLORIDE SERPL-SCNC: 93 MMOL/L (ref 99–110)
CO2 SERPL-SCNC: 28 MMOL/L (ref 21–32)
CREAT SERPL-MCNC: 0.9 MG/DL (ref 0.8–1.3)
GFR SERPLBLD CREATININE-BSD FMLA CKD-EPI: >60 ML/MIN/{1.73_M2}
GLUCOSE BLD-MCNC: 145 MG/DL (ref 70–99)
GLUCOSE BLD-MCNC: 173 MG/DL (ref 70–99)
GLUCOSE SERPL-MCNC: 127 MG/DL (ref 70–99)
MAGNESIUM SERPL-MCNC: 2.6 MG/DL (ref 1.8–2.4)
PERFORMED ON: ABNORMAL
PERFORMED ON: ABNORMAL
POTASSIUM SERPL-SCNC: 3.5 MMOL/L (ref 3.5–5.1)
SODIUM SERPL-SCNC: 131 MMOL/L (ref 136–145)

## 2023-03-17 PROCEDURE — 6370000000 HC RX 637 (ALT 250 FOR IP): Performed by: NURSE PRACTITIONER

## 2023-03-17 PROCEDURE — 6360000002 HC RX W HCPCS: Performed by: NURSE PRACTITIONER

## 2023-03-17 PROCEDURE — 94761 N-INVAS EAR/PLS OXIMETRY MLT: CPT

## 2023-03-17 PROCEDURE — 99238 HOSP IP/OBS DSCHRG MGMT 30/<: CPT | Performed by: INTERNAL MEDICINE

## 2023-03-17 PROCEDURE — 80048 BASIC METABOLIC PNL TOTAL CA: CPT

## 2023-03-17 PROCEDURE — 36415 COLL VENOUS BLD VENIPUNCTURE: CPT

## 2023-03-17 PROCEDURE — 83735 ASSAY OF MAGNESIUM: CPT

## 2023-03-17 PROCEDURE — 94640 AIRWAY INHALATION TREATMENT: CPT

## 2023-03-17 PROCEDURE — 2700000000 HC OXYGEN THERAPY PER DAY

## 2023-03-17 PROCEDURE — 2580000003 HC RX 258: Performed by: NURSE PRACTITIONER

## 2023-03-17 PROCEDURE — 6370000000 HC RX 637 (ALT 250 FOR IP): Performed by: INTERNAL MEDICINE

## 2023-03-17 RX ORDER — LIDOCAINE 4 G/G
1 PATCH TOPICAL DAILY
Qty: 15 PATCH | Refills: 0 | Status: SHIPPED | OUTPATIENT
Start: 2023-03-18

## 2023-03-17 RX ORDER — TORSEMIDE 20 MG/1
20 TABLET ORAL ONCE
Status: COMPLETED | OUTPATIENT
Start: 2023-03-17 | End: 2023-03-17

## 2023-03-17 RX ADMIN — Medication 2 PUFF: at 07:08

## 2023-03-17 RX ADMIN — Medication 2 PUFF: at 07:06

## 2023-03-17 RX ADMIN — ENOXAPARIN SODIUM 30 MG: 100 INJECTION SUBCUTANEOUS at 08:39

## 2023-03-17 RX ADMIN — HYDROCODONE BITARTRATE AND ACETAMINOPHEN 1 TABLET: 5; 325 TABLET ORAL at 08:38

## 2023-03-17 RX ADMIN — MULTIPLE VITAMINS W/ MINERALS TAB 1 TABLET: TAB at 08:38

## 2023-03-17 RX ADMIN — CLOPIDOGREL BISULFATE 75 MG: 75 TABLET ORAL at 08:38

## 2023-03-17 RX ADMIN — TIOTROPIUM BROMIDE INHALATION SPRAY 2 PUFF: 3.12 SPRAY, METERED RESPIRATORY (INHALATION) at 07:07

## 2023-03-17 RX ADMIN — ASPIRIN 81 MG: 81 TABLET, COATED ORAL at 08:38

## 2023-03-17 RX ADMIN — RANOLAZINE 500 MG: 500 TABLET, FILM COATED, EXTENDED RELEASE ORAL at 08:38

## 2023-03-17 RX ADMIN — TORSEMIDE 20 MG: 20 TABLET ORAL at 12:31

## 2023-03-17 RX ADMIN — ACETAMINOPHEN 650 MG: 325 TABLET ORAL at 02:25

## 2023-03-17 RX ADMIN — Medication 10 ML: at 08:41

## 2023-03-17 RX ADMIN — METOPROLOL SUCCINATE 25 MG: 25 TABLET, EXTENDED RELEASE ORAL at 08:39

## 2023-03-17 RX ADMIN — PREGABALIN 100 MG: 100 CAPSULE ORAL at 10:45

## 2023-03-17 ASSESSMENT — PAIN SCALES - GENERAL
PAINLEVEL_OUTOF10: 3
PAINLEVEL_OUTOF10: 5

## 2023-03-17 ASSESSMENT — PAIN DESCRIPTION - DESCRIPTORS: DESCRIPTORS: ACHING

## 2023-03-17 ASSESSMENT — PAIN DESCRIPTION - LOCATION
LOCATION: BACK
LOCATION: HEAD

## 2023-03-17 ASSESSMENT — PAIN DESCRIPTION - ORIENTATION
ORIENTATION: MID
ORIENTATION: RIGHT

## 2023-03-17 NOTE — FLOWSHEET NOTE
03/16/23 1952   Vital Signs   Temp 97.8 °F (36.6 °C)   Temp Source Oral   Heart Rate 91   Heart Rate Source Monitor   Resp 18   /65   MAP (Calculated) 86   BP Location Left lower arm   BP Method Automatic   Patient Position High fowlers   Level of Consciousness 0   MEWS Score 1   Oxygen Therapy   SpO2 94 %   O2 Device Nasal cannula   O2 Flow Rate (L/min) 2 L/min   Pt assessment complete. Pt lying in bed quietly. Lung sounds diminished. Pt on 02 2 liters via nasal canula. Pt NSR per monitor with HR of 91. Nightly medications given. PRN norco given for R shoulder pain. Pt rates pain 5/10. No other needs at this time. Call light within reach.

## 2023-03-17 NOTE — PROGRESS NOTES
Discharge order received. Patient informed of discharge order. Discharge instructions reviewed with patient . Copy of discharge instructions given to patient. Signed prescriptions x given to patient. Patient verbalized understanding, denies needs or questions at this time. IV and telemetry removed. All patient belongings packed and sent with patient upon discharge. Devika Nina

## 2023-03-17 NOTE — PROGRESS NOTES
03/16/23 2003   RT Protocol   History Pulmonary Disease 2   Respiratory pattern 0   Breath sounds 2   Cough 0   Indications for Bronchodilator Therapy Decreased or absent breath sounds   Bronchodilator Assessment Score 4   RT Inhaler-Nebulizer Bronchodilator Protocol Note    There is a bronchodilator order in the chart from a provider indicating to follow the RT Bronchodilator Protocol and there is an Initiate RT Inhaler-Nebulizer Bronchodilator Protocol order as well (see protocol at bottom of note). CXR Findings:  No results found. The findings from the last RT Protocol Assessment were as follows:   History Pulmonary Disease: Chronic pulmonary disease  Respiratory Pattern: Regular pattern and RR 12-20 bpm  Breath Sounds: Slightly diminished and/or crackles  Cough: Strong, spontaneous, non-productive  Indication for Bronchodilator Therapy: Decreased or absent breath sounds  Bronchodilator Assessment Score: 4    Aerosolized bronchodilator medication orders have been revised according to the RT Inhaler-Nebulizer Bronchodilator Protocol below. Respiratory Therapist to perform RT Therapy Protocol Assessment initially then follow the protocol. Repeat RT Therapy Protocol Assessment PRN for score 0-3 or on second treatment, BID, and PRN for scores above 3. No Indications - adjust the frequency to every 6 hours PRN wheezing or bronchospasm, if no treatments needed after 48 hours then discontinue using Per Protocol order mode. If indication present, adjust the RT bronchodilator orders based on the Bronchodilator Assessment Score as indicated below. Use Inhaler orders unless patient has one or more of the following: on home nebulizer, not able to hold breath for 10 seconds, is not alert and oriented, cannot activate and use MDI correctly, or respiratory rate 25 breaths per minute or more, then use the equivalent nebulizer order(s) with same Frequency and PRN reasons based on the score.   If a patient is on this medication at home then do not decrease Frequency below that used at home. 0-3 - enter or revise RT bronchodilator order(s) to equivalent RT Bronchodilator order with Frequency of every 4 hours PRN for wheezing or increased work of breathing using Per Protocol order mode. 4-6 - enter or revise RT Bronchodilator order(s) to two equivalent RT bronchodilator orders with one order with BID Frequency and one order with Frequency of every 4 hours PRN wheezing or increased work of breathing using Per Protocol order mode. 7-10 - enter or revise RT Bronchodilator order(s) to two equivalent RT bronchodilator orders with one order with TID Frequency and one order with Frequency of every 4 hours PRN wheezing or increased work of breathing using Per Protocol order mode. 11-13 - enter or revise RT Bronchodilator order(s) to one equivalent RT bronchodilator order with QID Frequency and an Albuterol order with Frequency of every 4 hours PRN wheezing or increased work of breathing using Per Protocol order mode. Greater than 13 - enter or revise RT Bronchodilator order(s) to one equivalent RT bronchodilator order with every 4 hours Frequency and an Albuterol order with Frequency of every 2 hours PRN wheezing or increased work of breathing using Per Protocol order mode.        Electronically signed by Ahmet Juarez RCP on 3/16/2023 at 8:06 PM

## 2023-03-17 NOTE — PROGRESS NOTES
RT Inhaler-Nebulizer Bronchodilator Protocol Note    There is a bronchodilator order in the chart from a provider indicating to follow the RT Bronchodilator Protocol and there is an Initiate RT Inhaler-Nebulizer Bronchodilator Protocol order as well (see protocol at bottom of note). CXR Findings:  No results found. The findings from the last RT Protocol Assessment were as follows:   History Pulmonary Disease: (P) Chronic pulmonary disease  Respiratory Pattern: (P) Regular pattern and RR 12-20 bpm  Breath Sounds: (P) Slightly diminished and/or crackles  Cough: (P) Strong, spontaneous, non-productive  Indication for Bronchodilator Therapy: (P) Decreased or absent breath sounds  Bronchodilator Assessment Score: (P) 4    Aerosolized bronchodilator medication orders have been revised according to the RT Inhaler-Nebulizer Bronchodilator Protocol below. Respiratory Therapist to perform RT Therapy Protocol Assessment initially then follow the protocol. Repeat RT Therapy Protocol Assessment PRN for score 0-3 or on second treatment, BID, and PRN for scores above 3. No Indications - adjust the frequency to every 6 hours PRN wheezing or bronchospasm, if no treatments needed after 48 hours then discontinue using Per Protocol order mode. If indication present, adjust the RT bronchodilator orders based on the Bronchodilator Assessment Score as indicated below. Use Inhaler orders unless patient has one or more of the following: on home nebulizer, not able to hold breath for 10 seconds, is not alert and oriented, cannot activate and use MDI correctly, or respiratory rate 25 breaths per minute or more, then use the equivalent nebulizer order(s) with same Frequency and PRN reasons based on the score. If a patient is on this medication at home then do not decrease Frequency below that used at home.     0-3 - enter or revise RT bronchodilator order(s) to equivalent RT Bronchodilator order with Frequency of every 4 hours PRN for wheezing or increased work of breathing using Per Protocol order mode. 4-6 - enter or revise RT Bronchodilator order(s) to two equivalent RT bronchodilator orders with one order with BID Frequency and one order with Frequency of every 4 hours PRN wheezing or increased work of breathing using Per Protocol order mode. 7-10 - enter or revise RT Bronchodilator order(s) to two equivalent RT bronchodilator orders with one order with TID Frequency and one order with Frequency of every 4 hours PRN wheezing or increased work of breathing using Per Protocol order mode. 11-13 - enter or revise RT Bronchodilator order(s) to one equivalent RT bronchodilator order with QID Frequency and an Albuterol order with Frequency of every 4 hours PRN wheezing or increased work of breathing using Per Protocol order mode. Greater than 13 - enter or revise RT Bronchodilator order(s) to one equivalent RT bronchodilator order with every 4 hours Frequency and an Albuterol order with Frequency of every 2 hours PRN wheezing or increased work of breathing using Per Protocol order mode.          Electronically signed by Leonora Figueroa RCP on 3/17/2023 at 7:27 AM

## 2023-03-17 NOTE — FLOWSHEET NOTE
03/17/23 0830   Vitals   Temp 97.7 °F (36.5 °C)   Temp Source Oral   Heart Rate 86   Resp 20   /75   MAP (Calculated) 95   BP Location Right upper arm   Level of Consciousness 0   MEWS Score 1   Pain Assessment   Pain Level 5   Patient's Stated Pain Goal 0 - No pain   Pain Location Back   Pain Orientation Right   Functional Pain Assessment Activities are not prevented   Oxygen Therapy   SpO2 95 %   O2 Device Nasal cannula   O2 Flow Rate (L/min) 2 L/min   Shift assessment complete. Scheduled med's given. See MAR. Patients head-toe complete, VS are logged. Pt C/O pain gave PRN norco. The bed is locked and is in the lowest position. Call light and bedside table are within reach.

## 2023-03-17 NOTE — PLAN OF CARE
Problem: Discharge Planning  Goal: Discharge to home or other facility with appropriate resources  Outcome: Progressing     Problem: Safety - Adult  Goal: Free from fall injury  Outcome: Progressing     Problem: Chronic Conditions and Co-morbidities  Goal: Patient's chronic conditions and co-morbidity symptoms are monitored and maintained or improved  Outcome: Progressing     Problem: Cardiovascular - Adult  Goal: Maintains optimal cardiac output and hemodynamic stability  Outcome: Progressing     Problem: Pain  Goal: Verbalizes/displays adequate comfort level or baseline comfort level  Outcome: Progressing

## 2023-03-17 NOTE — PROGRESS NOTES
Chief Complaint   Patient presents with   • GERD       SUBJECTIVE:  Kevin Chaparro is a 46 year old male who complains of heartburn.  Symptoms have been present for:  Over 3 weeks.  This has been associated with bilious reflux, cough and heartburn.  He denies bilious reflux, cough and heartburn.  He denies dysphagia.  He has not lost weight.  He denies melena, hematochezia, hematemesis, and coffee ground emesis.  Medical therapy in the past has included:  Zantac.    No coffee, tea. 4-5 bottles of soda/week. Couple of beers/week. Rare NSAID use.     ALLERGIES AND CURRENT MEDICATIONS REVIEWED ON EPIC.    OBJECTIVE:  Visit Vitals  /80 (BP Location: [facility], Patient Position: Sitting, Cuff Size: Large Adult)   Pulse 95   Temp 97.6 °F (36.4 °C) (Oral)   Resp 16   Ht 6' 1.5\" (1.867 m)   Wt 135.6 kg   BMI 38.91 kg/m²     General:  Healthy, alert and in no acute distress, well hydrated.  Oropharynx:  Mucous membranes moist.  Cardiovascular:  Regular rate and rhythm.  No murmurs.  Respiratory:  Normal respiratory effort.  Clear to auscultation bilaterally.   Abdomen:  Normal bowel sounds, soft, no organomegaly.  Tenderness none.  No masses noted.  Psychiatric:  Alert and oriented x3.  Mood and affect appropriate.      ASSESSMENT:  1. GERD without esophagitis          PLAN:  Orders Placed This Encounter   • pantoprazole (PROTONIX) 40 MG tablet     Sig: Take 1 tablet by mouth daily (before breakfast).     Dispense:  30 tablet     Refill:  1   if no improvement, will check CXR  Reflux precautions discussed with him. Handout given.    Appropriate use and side effects of medication discussed with patient.  All questions answered and patient is agreeable to this plan.  Refer to After Visit Summary.  Follow up in one month, but instructed to return to clinic or call if symptoms are not resolved as discussed, or sooner if worse.    Virginia Smith M.D.     HEART FAILURE CARE PLAN:    Comorbidities Reviewed: Yes   Patient has a past medical history of Acute diverticulitis, Acute kidney injury ([address] Acute on chronic respiratory failure with hypoxemia ([address] Acute respiratory failure ([address] Acute respiratory failure with hypoxia ([address] Anxiety, Aortic valve calcification, CAD in native artery, Chronic obstructive pulmonary disease (HCC), Chronic systolic congestive heart failure (HCC), Class 3 severe obesity with body mass index (BMI) of 45.0 to 49.9 in [facility]), Coronary artery calcification, Coronary artery disease involving native heart with angina pectoris ([address] Crush injury of right foot, DDD (degenerative disc disease), lumbar, Depression, Diverticulitis of colon, Erectile dysfunction, Fatigue, HNP (herniated nucleus pulposus), lumbar, Hyperglycemia, Hyperlipidemia, Hypersomnia, Hypertension, Insomnia, Ischemic cardiomyopathy, Kidney stone, Lumbar radiculopathy, Lumbar spine pain, LVH (left ventricular hypertrophy), Mobitz type I Wenckebach atrioventricular block, Moderate protein-calorie malnutrition ([address] Observed sleep apnea, OANH (obstructive sleep apnea), Pneumonia, primary atypical, Reactive depression, S/P three vessel coronary artery bypass, Spondylosis without myelopathy or radiculopathy, lumbar region, Tobacco abuse, Tobacco use, and Wears dentures. ECHOCARDIOGRAM Reviewed: Yes   Patient's Ejection Fraction (EF) is greater than 40%    Weights Reviewed:  Yes   Admission weight: (!) 302 lb (137 kg)   Wt Readings from Last 3 Encounters:   03/17/23 298 lb 3.2 oz (135.3 kg)   02/16/23 (!) 301 lb (136.5 kg)   02/08/23 298 lb (135.2 kg)     Intake & Output Reviewed: Yes     Intake/Output Summary (Last 24 hours) at 3/17/2023 0849  Last data filed at 3/17/2023 0810  Gross per 24 hour   Intake 3059.24 ml   Output 2150 ml   Net 909.24 ml     Medications Reviewed: Yes   SCHEDULED HOSPITAL MEDICATIONS:   pregabalin  100 mg Oral BID    [Held by provider] sacubitril-valsartan  1 tablet Oral BID    [Held by provider] furosemide  40 mg Oral Daily    sodium chloride flush  5-40 mL IntraVENous 2 times per day    enoxaparin  30 mg SubCUTAneous BID    insulin lispro  0-4 Units SubCUTAneous TID WC    insulin lispro  0-4 Units SubCUTAneous Nightly    lidocaine  1 patch TransDERmal Daily    amitriptyline  10 mg Oral Nightly    aspirin  81 mg Oral Daily    atorvastatin  80 mg Oral Daily    clopidogrel  75 mg Oral Daily    [Held by provider] empagliflozin  10 mg Oral Daily    [Held by provider] isosorbide mononitrate  120 mg Oral Daily    metoprolol succinate  25 mg Oral Daily    therapeutic multivitamin-minerals  1 tablet Oral Daily    ranolazine  500 mg Oral BID    [Held by provider] spironolactone  25 mg Oral Daily    budesonide-formoterol  2 puff Inhalation BID    And    tiotropium  2 puff Inhalation Daily    albuterol sulfate HFA  2 puff Inhalation BID     ACE/ARB/ARNI is REQUIRED for EF </= 51% SYSTOLIC FAILURE:   ACE[de-identified] Y   ARB[de-identified] Y  ARNI[de-identified] Y    Evidenced-Based Beta Blocker is REQUIRED for EF </= 56% SYSTOLIC FAILURE:   [de-identified] Metoprolol SUCCinate- Toprol XL    Diuretics:  [de-identified] NA    Diet Reviewed: Yes   ADULT DIET; Regular; 4 carb choices (60 gm/meal); 2000 ml    Goal of Care Reviewed: Yes   Patient and/or Family's stated Goal of Care this Admission: increase activity tolerance prior to discharge.

## 2023-03-17 NOTE — PROGRESS NOTES
Pt is lying in bed with their eyes closed. Respirations are easy and even. Call light within reach bed in lowest position with the wheels locked. Will continue to monitor.  Darshan Marroquin RN

## 2023-03-17 NOTE — PROGRESS NOTES
IM Progress Note    Admit Date:  3/13/2023    Presented for chest pain, leg swelling, fatigue and weight gain  Admitted for further evaluation and management of acute CHF exacerbation and chest pain rule out. Cardiology consulted  became dizzy and diaphoretic after he had a BM. Holding BP meds  Given IVF yesterday for elevated creatinine     Held diuretics and feelingbetter    Subjective:    Mr. Surya Iniguez seen up in hallway ambulating on RA, feels much improved  No sob or hypoxia   Weight at baseline per pt    Objective:   Patient Vitals for the past 4 hrs:   BP Temp Temp src Pulse Resp SpO2   03/17/23 0830 135/75 97.7 °F (36.5 °C) Oral 86 20 95 %       Blood pressure was down to the 70s, with 250 mL fluid bolus systolic blood pressure up to 89/50 now. , RR 16, afebrile     Intake/Output Summary (Last 24 hours) at 3/17/2023 1215  Last data filed at 3/17/2023 1118  Gross per 24 hour   Intake 2587.24 ml   Output 1675 ml   Net 912.24 ml         Physical Exam:    Gen:  morbidly obese  middle aged male up in hallway   Awake , alert and oriented . Appears older than stated age,   Eyes: PERRL. No sclera icterus. No conjunctival injection. ENT: No discharge. Pharynx clear. Neck: No JVD. Trachea midline. Resp: No accessory muscle use. No crackles. No wheezes. No rhonchi. CV:  .  Regular rhythm. No murmur. No rub. +BLE edema. PPM left chest old healed scar   GI: non tender today . Obese non distended  Skin: Warm and dry. No nodule on exposed extremities. No rash on exposed extremities. M/S: No cyanosis. No joint deformity. No clubbing. ROM limited to right upper extremity, neurovascularly intact. ++pulses warm to touch   Neuro: Awake. Grossly nonfocal    Psych: Oriented x 3. No anxiety or agitation.        Scheduled Meds:   torsemide  20 mg Oral Once    pregabalin  100 mg Oral BID    [Held by provider] sacubitril-valsartan  1 tablet Oral BID    [Held by provider] furosemide  40 mg Oral Daily    sodium chloride flush  5-40 mL IntraVENous 2 times per day    enoxaparin  30 mg SubCUTAneous BID    insulin lispro  0-4 Units SubCUTAneous TID WC    insulin lispro  0-4 Units SubCUTAneous Nightly    lidocaine  1 patch TransDERmal Daily    amitriptyline  10 mg Oral Nightly    aspirin  81 mg Oral Daily    atorvastatin  80 mg Oral Daily    clopidogrel  75 mg Oral Daily    [Held by provider] empagliflozin  10 mg Oral Daily    [Held by provider] isosorbide mononitrate  120 mg Oral Daily    metoprolol succinate  25 mg Oral Daily    therapeutic multivitamin-minerals  1 tablet Oral Daily    ranolazine  500 mg Oral BID    [Held by provider] spironolactone  25 mg Oral Daily    budesonide-formoterol  2 puff Inhalation BID    And    tiotropium  2 puff Inhalation Daily    albuterol sulfate HFA  2 puff Inhalation BID       Continuous Infusions:   sodium chloride      dextrose         PRN Meds:  calcium carbonate, sodium chloride flush, sodium chloride, ondansetron **OR** ondansetron, polyethylene glycol, acetaminophen **OR** acetaminophen, potassium chloride **OR** potassium alternative oral replacement **OR** potassium chloride, magnesium sulfate, glucose, dextrose bolus **OR** dextrose bolus, glucagon (rDNA), dextrose, benzonatate, HYDROcodone 5 mg - acetaminophen, albuterol      Data:  CBC:   Recent Labs     03/15/23  0438 03/16/23 0439   WBC 12.1* 8.8   HGB 15.5 14.7   HCT 47.3 44.2   MCV 88.6 89.5    187       BMP:   Recent Labs     03/15/23  0438 03/16/23  0439 03/17/23  0522   * 138 131*   K 3.7 3.7 3.5   CL 84* 95* 93*   CO2 36* 31 28   BUN 37* 31* 21*   CREATININE 2.0* 1.0 0.9       CULTURES  Covid/influenza not detected        RADIOLOGY  XR ABDOMEN (KUB) (SINGLE AP VIEW)   Final Result   Couple left renal calculi measuring up to 3 mm. Phleboliths in the pelvis. No evidence of bowel obstruction. CT HEAD WO CONTRAST   Final Result   No acute intracranial abnormality.          XR SHOULDER RIGHT (MIN 2 VIEWS)   Final Result   No acute abnormality.         CT CHEST PULMONARY EMBOLISM W CONTRAST   Final Result   No evidence of pulmonary embolism.      No evidence of thoracic aortic aneurysm or dissection.      Evidence of previous CABG.      Minimal dependent atelectatic changes at the posterior lung bases   bilaterally.  No other focal abnormality or acute process in the lungs.  No   evidence of pleural effusion or pneumothorax.  Evidence of at least moderate   diffuse fatty infiltration in visualized portion of the liver.      No subphrenic acute process demonstrated.             Assessment/Plan:      Acute on Chronic combined CHF    - echo 2/3/2023 showed EF 46% with grade 1 DD  - has been following with [facility] and Lasix was recently transitioned to torsemide and aldactone was added as well as Zaroxolyn weekly   - weight on admission 301--monitor;  dry weight per patient is 296  - BNP normal  - started on IV Lasix 40 mg BID- wt down to 298  - cardiology consulted, pt wanted to stay here at [facility] instead of being transferred to [facility]  - he does cardiac rehab here  - wt improved to 296 but developed elevated creatinine , hypotension and dizziness needing IVF boluses  - held BP meds and diuresis for a day and improved now  - no weight changes or hypoxia   - hard to assess fluid volume as pt is morbidly obese and has number of complaints  - will resume home demadex and all home meds with no changes now   - daily weights, low sodium diet, fluid restriction and strict I/O  -Seen by cardiology,     Hypotension and ARF    Sec to multiple BP meds and addition of entresto- dc entresto  Given IVF and improved creatinine   Resumed home meds and diuresis as tolerated         CAD  Hx of CABG 9/2020  Chest Pain  Hx of ischemic cardiomyopathy  - serial troponins flat   - no acute EKG changes noted   - d-dimer elevated and CTPA negative for PE  - hx of CABG on 9/2020. S/p cardiac catheterization on 04/2021   - appears patient is on  aspirin, Plavix, Toprol, Imdur  - monitor on telemetry  - cardiology consulted - r/o MI and low suspicion for ACS       SSS  S/p PPM on 8/26/2021     Right shoulder pain- appears to be musculoskeletal in nature  - neurovascularly intact  - decreased ROM  -+pulses  - X-ray non-acute   - add Lidocaine patch     Leukocytosis  - likely reactive, procal 0.22   - no s/sx of infection- resolved  - monitor CBC     Hyponatremia  - likely 2/2 fluid overload  - 132 --> 134  - monitored with diuresis      Metabolic Syndrome with DM 2   - recently started on Metformin per PCP note  - will hold  - A1c 7.7%  - start SSI  - carb control diet   - monitor      COPD with chronic hypoxic resp failure  OANH   - stable on 4 L   - continue home inhalers    Hx of tobacco abuse        Morbid Obesity  - body mass index is 51.72 kg/m². - complicating assessment and treatment. Placing patient at risk for multiple co-morbidities as well as early death and contributing to the patient's presentation. - counseled on weight loss. DVT Prophylaxis: Lovenox  Diet: ADULT DIET;  Regular; 4 carb choices (60 gm/meal); 2000 ml  Code Status: Full Code    Dc home with no changes in home meds     Juno Mace MD   3/17/2023

## 2023-03-17 NOTE — PROGRESS NOTES
Dropped pt first pulled lyrica on floor wasted x 2 nurse Joella Dubin RN and Joe Mccormack RN     Electronically signed by Dena Seo RN on 3/17/2023 at 12:36 PM

## 2023-03-20 ENCOUNTER — FOLLOWUP TELEPHONE ENCOUNTER (OUTPATIENT)
Dept: ADMINISTRATIVE | Age: 63
End: 2023-03-20

## 2023-03-20 ENCOUNTER — HOSPITAL ENCOUNTER (OUTPATIENT)
Dept: CARDIAC REHAB | Age: 63
Setting detail: THERAPIES SERIES
Discharge: HOME | End: 2023-03-20
Payer: COMMERCIAL

## 2023-03-20 ENCOUNTER — CARE COORDINATION (OUTPATIENT)
Dept: CASE MANAGEMENT | Age: 63
End: 2023-03-20

## 2023-03-20 ENCOUNTER — TELEPHONE (OUTPATIENT)
Dept: FAMILY MEDICINE CLINIC | Age: 63
End: 2023-03-20

## 2023-03-20 DIAGNOSIS — I50.43 ACUTE ON CHRONIC COMBINED SYSTOLIC AND DIASTOLIC CHF (CONGESTIVE HEART FAILURE) (HCC): Primary | ICD-10-CM

## 2023-03-20 NOTE — CARE COORDINATION
(Phillip López). Wife will drive. Wife works as nurse at a SNF on [employer] and every other Friday. Scheduled with Victorino Bowles MD [facility]) in May. Does not plan to keep scheduled appointments with I providers because all of his cardiac procedures have been done at [facility]. Pharmacy did not have lidocaine patch had to order. Wife manages medications. Hasn't checked BS. Reviewed A1C was elevated since last time at 7.7%. Reviewed diabetic diet. He states he is constantly thirsty. Reviewed this could be elevated BS and/or diuretic and to not drink over 64 oz/day and increase oral care for comfort. He v/u. He doesn't use his nebulizer - prefers inh. Wearing O2 4lpm with SaO2 93-94%. Does not have portable. Does not wear O2 when he is feeding his animals or other chores \"too far\". He states he has long enough oxygen tubing to get where he needs to go but he is quicker doing this without O2. Encouraged him to wear his O2 at all times and explained importance of oxygen. He has CPAP but cannot sleep with it so only uses oxygen. States he has tried all different masks and fittings. CTN offered to see if his [facility] cardiology can see him sooner. He declined. CHF education:  -if you take a water pill - do not skip doses  -weigh daily in the morning at the same time and in the same clothing  -report weight gain of >3#/day or >5#/week  -report increased SOB, edema, abd fullness, difficulty lying flat  -report palpitations, cough, difficulty urinating  -reviewed salt and fluid restrictions     States prior to admission his weight went up to 315# and he felt bloated, swelled up and was barely urinating after taking diuretic and that's why he had his daughter bring him to the hospital. He said he did message his [facility] cardiology office about the weight gain and they instructed him to come to [facility] but that was too far so he came to [place]. His weight today 298.6# on home scale.  Stressed importance of escalating any symptoms

## 2023-03-20 NOTE — TELEPHONE ENCOUNTER
R[facility]   6/7/2023  9:15 AM MHCZ CARDIAC, PHASE II CLASS MHCZ CP TOBIN [facility]   6/9/2023  9:15 AM [address] [facility], PHASE II CLASS MHCZ CP [address] [facility]       Josephine Pina RN

## 2023-03-20 NOTE — TELEPHONE ENCOUNTER
CARDIAC, PHASE II CLASS MHCZ CP TOBIN [facility]   4/19/2023  9:00 AM Jillian Calloway MD [facility]   4/19/2023  9:15 AM MHCZ CARDIAC, PHASE II CLASS MHCZ CP TOBIN [facility]   4/21/2023  9:15 AM MHCZ CARDIAC, PHASE II CLASS MHCZ CP TOBIN [facility]   4/24/2023  9:15 AM MHCZ CARDIAC, PHASE II CLASS MHCZ CP TOBIN [facility]   4/25/2023 11:35 AM SCHEDULE, PAUL REMOTE TRANSMISSION eleni [facility]   4/26/2023  9:15 AM [address] [facility], PHASE II CLASS MHCZ CP TOBIN [facility]   4/28/2023  9:15 AM 1495 [facility], PHASE II CLASS MHCZ CP TOBIN Randa HOD   5/1/2023  9:15 AM MHCZ CARDIAC, PHASE II CLASS MHCZ CP TOBIN [facility]   5/3/2023  9:15 AM MHCZ CARDIAC, PHASE II CLASS MHCZ CP TOBIN [facility]   5/5/2023  9:15 AM MHCZ CARDIAC, PHASE II CLASS MHCZ CP TOBIN [facility]   5/8/2023  9:15 AM MHCZ CARDIAC, PHASE II CLASS MHCZ CP TOBIN [facility]   5/10/2023  9:15 AM MHCZ CARDIAC, PHASE II CLASS MHCZ CP TOBIN [facility]   5/12/2023  9:15 AM MHCZ CARDIAC, PHASE II CLASS MHCZ CP TOBIN [facility]   5/15/2023  9:15 AM MHCZ CARDIAC, PHASE II CLASS MHCZ CP TOBIN R[facility]   5/17/2023  9:15 AM MHCZ CARDIAC, PHASE II CLASS MHCZ CP TOBIN [facility]   5/19/2023  9:15 AM MHCZ CARDIAC, PHASE II CLASS MHCZ CP TOBIN [facility]   5/22/2023  9:15 AM MHCZ CARDIAC, PHASE II CLASS MHCZ CP TOBIN [facility]   5/23/2023  9:00 AM SCHEDULE, [facility] eleni [facility]   5/23/2023  9:00 AM Irina Soto, APRN - CNP eleni [facility]   5/24/2023  9:15 AM [address] [facility], PHASE II CLASS MHCZ CP TOBIN [facility]   5/26/2023  9:15 AM [address], PHASE II CLASS MHCZ CP TOBIN [facility]   5/29/2023  9:15 AM MHCZ CARDIAC, PHASE II CLASS MHCZ CP [facility]   5/31/2023  9:15 AM MHCZ CARDIAC, PHASE II CLASS MHCZ CP [facility]   6/2/2023  9:15 AM MHCZ CARDIAC, PHASE II CLASS MHCZ CP [facility]   6/5/2023  9:15 AM MHCZ CARDIAC, PHASE II CLASS MHCZ CP [facility]   6/7/2023  9:15 AM MHCZ CARDIAC, PHASE II CLASS MHCZ CP [address] [facility]

## 2023-03-21 ENCOUNTER — TELEPHONE (OUTPATIENT)
Dept: FAMILY MEDICINE CLINIC | Age: 63
End: 2023-03-21

## 2023-03-21 NOTE — TELEPHONE ENCOUNTER
Inhale 1 puff into the lungs daily 3 each 1    albuterol (PROVENTIL) (2.5 MG/3ML) 0.083% nebulizer solution Take 3 mLs by nebulization every 6 hours as needed for Wheezing or Shortness of Breath 360 each 5    albuterol sulfate HFA (PROVENTIL HFA) 108 (90 Base) MCG/ACT inhaler Inhale 2 puffs into the lungs every 4 hours as needed for Wheezing or Shortness of Breath 3 each 1    Omega-3 Fatty Acids (FISH OIL) 1000 MG CAPS Take 2 capsules by mouth 2 times daily 90 capsule 3    clopidogrel (PLAVIX) 75 MG tablet Take 1 tablet by mouth daily 90 tablet 3    atorvastatin (LIPITOR) 80 MG tablet Take 1 tablet by mouth nightly (Patient taking differently: Take 80 mg by mouth daily) 90 tablet 3    Multiple Vitamins-Minerals (THERAPEUTIC MULTIVITAMIN-MINERALS) tablet Take 1 tablet by mouth daily      OXYGEN Inhale 4 L into the lungs as needed      nitroGLYCERIN (NITROSTAT) 0.4 MG SL tablet Place 1 tablet under the tongue every 5 minutes as needed for Chest pain 25 tablet 3    aspirin 81 MG EC tablet Take 1 tablet by mouth daily 30 tablet 0     No current facility-administered medications for this visit. Patient met with  RN for Diabetic education. Information and written material given. [place] [facility] , Meal planning and carb counting reviewed. Information and written material given on s/s hypo & hyperglycemia. Reviewed with pt   Information and written material given on meal building. Informed pt to contact RN for questions or concerns with DM, nutrition and support. GOALS:  Pt will improve A1C results . Next appt :  PCP  3/29/23  Last A1C: 3/13/23 ;  7.7    PLAN:  RN will follow up with phone call in 1 week    Is this patient active with care coordination? No    Free Style Puneet CGM applied to L arm. RN reviewed Free Style education and videos. Linked to PCP [employer]. Patient verbalized good understanding of instructions.   Rx sent to [facility] for CGM  Free Style 2

## 2023-03-22 ENCOUNTER — APPOINTMENT (OUTPATIENT)
Dept: CARDIAC REHAB | Age: 63
End: 2023-03-22
Payer: COMMERCIAL

## 2023-03-22 RX ORDER — AMITRIPTYLINE HYDROCHLORIDE 10 MG/1
TABLET, FILM COATED ORAL
Qty: 30 TABLET | Refills: 1 | Status: SHIPPED | OUTPATIENT
Start: 2023-03-22

## 2023-03-24 ENCOUNTER — TELEPHONE (OUTPATIENT)
Dept: CARDIAC REHAB | Age: 63
End: 2023-03-24

## 2023-03-24 ENCOUNTER — HOSPITAL ENCOUNTER (OUTPATIENT)
Dept: CARDIAC REHAB | Age: 63
Setting detail: THERAPIES SERIES
Discharge: HOME | End: 2023-03-24
Payer: COMMERCIAL

## 2023-03-24 ENCOUNTER — CARE COORDINATION (OUTPATIENT)
Dept: CASE MANAGEMENT | Age: 63
End: 2023-03-24

## 2023-03-24 NOTE — PROGRESS NOTES
Call received from patient his cardiologist told him to hold cardiac rehab for now. Patient is seeing another heart doctor and doing more tests. Patient taken off schedule and is to call when cardiologist releases patient to return to cardiac rehab.

## 2023-03-24 NOTE — DISCHARGE SUMMARY
hours. No results for input(s): NA, K, CL, CO2, PHOS, BUN, CREATININE, CA in the last 72 hours. CBC:  Lab Results   Component Value Date/Time    WBC 8.8 03/16/2023 04:39 AM    HGB 14.7 03/16/2023 04:39 AM    HCT 44.2 03/16/2023 04:39 AM    MCV 89.5 03/16/2023 04:39 AM     03/16/2023 04:39 AM    NEUTOPHILPCT 70.2 03/16/2023 04:39 AM    LYMPHOPCT 18.1 03/16/2023 04:39 AM    MONOPCT 9.4 03/16/2023 04:39 AM    EOSPCT 0.9 03/16/2011 11:15 AM    BASOPCT 0.4 03/16/2023 04:39 AM    NEUTROABS 6.2 03/16/2023 04:39 AM    LYMPHSABS 1.6 03/16/2023 04:39 AM    MONOSABS 0.8 03/16/2023 04:39 AM    EOSABS 0.2 03/16/2023 04:39 AM    BASOSABS 0.0 03/16/2023 04:39 AM     BNP:   Lab Results   Component Value Date/Time     03/17/2023 05:22 AM    K 3.5 03/17/2023 05:22 AM    K 3.8 03/13/2023 01:00 PM    CO2 28 03/17/2023 05:22 AM    BUN 21 03/17/2023 05:22 AM    CREATININE 0.9 03/17/2023 05:22 AM    CALCIUM 9.1 03/17/2023 05:22 AM                Medication List        START taking these medications      lidocaine 4 % external patch  Place 1 patch onto the skin daily            CHANGE how you take these medications      atorvastatin 80 MG tablet  Commonly known as: LIPITOR  Take 1 tablet by mouth nightly  What changed: when to take this     isosorbide mononitrate 30 MG extended release tablet  Commonly known as: IMDUR  What changed: Another medication with the same name was removed. Continue taking this medication, and follow the directions you see here.      rOPINIRole 1 MG tablet  Commonly known as: Requip  Take 1 tablet by mouth nightly  What changed: when to take this            CONTINUE taking these medications      * albuterol (2.5 MG/3ML) 0.083% nebulizer solution  Commonly known as: PROVENTIL  Take 3 mLs by nebulization every 6 hours as needed for Wheezing or Shortness of Breath     * albuterol sulfate  (90 Base) MCG/ACT inhaler  Commonly known as: Proventil HFA  Inhale 2 puffs into the lungs every 4 hours

## 2023-03-24 NOTE — CARE COORDINATION
RN in office to follow up with recommendation to patient. Addressed changes since last contact:  none  Discussed follow-up appointments. If no appointment was previously scheduled, appointment scheduling offered: No.   Is follow up appointment scheduled within 7 days of discharge? Completed cardiology OV on 3/23. Follow Up  Future Appointments   Date Time Provider Riri Jara   3/29/2023 11:20 AM Jen Mathias, APRN - CNP [facility] [place] [facility] [place] [facility]   4/10/2023  9:00 AM MMO CT RM 1 MHCZ [facility]   4/19/2023  9:00 AM MD SHAHLA Calix PULM [facility]   4/25/2023 11:35 AM SCHEDULE, Hira Meneses REMOTE Brian Mandujano [facility]   5/23/2023  9:00 AM SCHEDULE, PAUL DEVICE CHECK [facility]   5/23/2023  9:00 AM ANICETO Mondragon CNP [facility]     Non-[facility] follow up appointment(s): [facility] cardiology      Care Transition Nurse reviewed medical action plan and red flags with patient and discussed any barriers to care and/or understanding of plan of care after discharge. Discussed appropriate site of care based on symptoms and resources available to patient including: PCP  Specialist. The patient agrees to contact the PCP office for questions related to their healthcare. Patients top risk factors for readmission: medical condition-CHF  Interventions to address risk factors: Communication with specialists who will assume or re-assume care of the patient's system-specific [facility] cardiology    Offered patient enrollment in the Remote Patient Monitoring (RPM) program for in-home monitoring: Patient declined. Care Transition Nurse provided contact information for future needs. Plan for follow-up call in 3-5 days based on severity of symptoms and risk factors. Plan for next call:  admitted to [facility]?      Kaylene Wilcox RN  Care Transition Nurse  [phone] mobile

## 2023-03-24 NOTE — TELEPHONE ENCOUNTER
Left voicemail for pt to return call regarding if he plans to return to [facility]. Pt no call/no show (3/24/23).

## 2023-03-27 ENCOUNTER — APPOINTMENT (OUTPATIENT)
Dept: CARDIAC REHAB | Age: 63
End: 2023-03-27
Payer: COMMERCIAL

## 2023-03-27 ENCOUNTER — CARE COORDINATION (OUTPATIENT)
Dept: CASE MANAGEMENT | Age: 63
End: 2023-03-27

## 2023-03-27 NOTE — CARE COORDINATION
he isn't eat much of anything because he felt so bloated. Encouraged hourly mobility - stand and reposition - walk around the home. Said he was able to walk to [employer] today. Does not wear O2 when he ambulates like that. CTN again encouraged wearing O2 continuously to keep SaO2 90s. Denies needs at this time. CTN will f/up after TCM visit. He has CTN contact number.     Follow Up  Future Appointments   Date Time Provider Riri Jara   3/29/2023 11:20 AM ANICETO Amador - CNP [facility] [place]  [place] [facility]   4/10/2023  9:00 AM MMO CT RM 1 MHCZ [facility]   4/19/2023  9:00 AM MD SHAHLA Patel [facility]   4/25/2023 11:35 AM SCHEDULE, Hailey Mclean [facility]   5/23/2023  9:00 AM SCHEDULE, [facility]   5/23/2023  9:00 AM ANICETO Taylor - ROXANA [facility]     Noemí Jason, RN  Care Transition Nurse  [phone] mobile

## 2023-03-29 ENCOUNTER — APPOINTMENT (OUTPATIENT)
Dept: CARDIAC REHAB | Age: 63
End: 2023-03-29
Payer: COMMERCIAL

## 2023-03-29 ENCOUNTER — HOSPITAL ENCOUNTER (OUTPATIENT)
Age: 63
Discharge: HOME | End: 2023-03-29
Payer: COMMERCIAL

## 2023-03-29 ENCOUNTER — HOSPITAL ENCOUNTER (OUTPATIENT)
Dept: GENERAL RADIOLOGY | Age: 63
Discharge: HOME | End: 2023-03-29
Payer: COMMERCIAL

## 2023-03-29 ENCOUNTER — OFFICE VISIT (OUTPATIENT)
Dept: FAMILY MEDICINE CLINIC | Age: 63
End: 2023-03-29

## 2023-03-29 VITALS
SYSTOLIC BLOOD PRESSURE: 118 MMHG | OXYGEN SATURATION: 92 % | DIASTOLIC BLOOD PRESSURE: 68 MMHG | BODY MASS INDEX: 52.41 KG/M2 | WEIGHT: 307 LBS | HEIGHT: 64 IN | HEART RATE: 90 BPM

## 2023-03-29 DIAGNOSIS — R06.00 DYSPNEA, UNSPECIFIED TYPE: ICD-10-CM

## 2023-03-29 DIAGNOSIS — I49.5 SSS (SICK SINUS SYNDROME) (HCC): ICD-10-CM

## 2023-03-29 DIAGNOSIS — J44.9 MODERATE COPD (CHRONIC OBSTRUCTIVE PULMONARY DISEASE) (HCC): ICD-10-CM

## 2023-03-29 DIAGNOSIS — I50.43 ACUTE ON CHRONIC COMBINED SYSTOLIC AND DIASTOLIC CHF (CONGESTIVE HEART FAILURE) (HCC): Primary | ICD-10-CM

## 2023-03-29 DIAGNOSIS — N17.9 ACUTE RENAL FAILURE, UNSPECIFIED ACUTE RENAL FAILURE TYPE (HCC): ICD-10-CM

## 2023-03-29 DIAGNOSIS — Z09 HOSPITAL DISCHARGE FOLLOW-UP: ICD-10-CM

## 2023-03-29 DIAGNOSIS — I25.110 CORONARY ARTERY DISEASE INVOLVING NATIVE CORONARY ARTERY OF NATIVE HEART WITH UNSTABLE ANGINA PECTORIS (HCC): ICD-10-CM

## 2023-03-29 DIAGNOSIS — I50.43 ACUTE ON CHRONIC COMBINED SYSTOLIC AND DIASTOLIC CHF (CONGESTIVE HEART FAILURE) (HCC): ICD-10-CM

## 2023-03-29 DIAGNOSIS — D72.829 LEUKOCYTOSIS, UNSPECIFIED TYPE: ICD-10-CM

## 2023-03-29 DIAGNOSIS — E87.1 HYPONATREMIA: ICD-10-CM

## 2023-03-29 DIAGNOSIS — I95.9 HYPOTENSION, UNSPECIFIED HYPOTENSION TYPE: ICD-10-CM

## 2023-03-29 LAB
ANION GAP SERPL CALCULATED.3IONS-SCNC: 9 MMOL/L (ref 3–16)
BASOPHILS # BLD: 0.1 K/UL (ref 0–0.2)
BASOPHILS NFR BLD: 0.6 %
BUN SERPL-MCNC: 26 MG/DL (ref 7–20)
CALCIUM SERPL-MCNC: 10.5 MG/DL (ref 8.3–10.6)
CHLORIDE SERPL-SCNC: 95 MMOL/L (ref 99–110)
CO2 SERPL-SCNC: 33 MMOL/L (ref 21–32)
CREAT SERPL-MCNC: 1 MG/DL (ref 0.8–1.3)
DEPRECATED RDW RBC AUTO: 16.5 % (ref 12.4–15.4)
EOSINOPHIL # BLD: 0.3 K/UL (ref 0–0.6)
EOSINOPHIL NFR BLD: 2.5 %
GFR SERPLBLD CREATININE-BSD FMLA CKD-EPI: >60 ML/MIN/{1.73_M2}
GLUCOSE SERPL-MCNC: 175 MG/DL (ref 70–99)
HCT VFR BLD AUTO: 47.1 % (ref 40.5–52.5)
HGB BLD-MCNC: 15.5 G/DL (ref 13.5–17.5)
LYMPHOCYTES # BLD: 2 K/UL (ref 1–5.1)
LYMPHOCYTES NFR BLD: 18.4 %
MCH RBC QN AUTO: 29 PG (ref 26–34)
MCHC RBC AUTO-ENTMCNC: 33 G/DL (ref 31–36)
MCV RBC AUTO: 87.7 FL (ref 80–100)
MONOCYTES # BLD: 0.8 K/UL (ref 0–1.3)
MONOCYTES NFR BLD: 7.1 %
NEUTROPHILS # BLD: 7.7 K/UL (ref 1.7–7.7)
NEUTROPHILS NFR BLD: 71.4 %
NT-PROBNP SERPL-MCNC: 61 PG/ML (ref 0–124)
PLATELET # BLD AUTO: 227 K/UL (ref 135–450)
PMV BLD AUTO: 8.2 FL (ref 5–10.5)
POTASSIUM SERPL-SCNC: 3.9 MMOL/L (ref 3.5–5.1)
RBC # BLD AUTO: 5.37 M/UL (ref 4.2–5.9)
SODIUM SERPL-SCNC: 137 MMOL/L (ref 136–145)
WBC # BLD AUTO: 10.8 K/UL (ref 4–11)

## 2023-03-29 PROCEDURE — 80048 BASIC METABOLIC PNL TOTAL CA: CPT

## 2023-03-29 PROCEDURE — 83880 ASSAY OF NATRIURETIC PEPTIDE: CPT

## 2023-03-29 PROCEDURE — 71046 X-RAY EXAM CHEST 2 VIEWS: CPT

## 2023-03-29 PROCEDURE — 85025 COMPLETE CBC W/AUTO DIFF WBC: CPT

## 2023-03-29 PROCEDURE — 36415 COLL VENOUS BLD VENIPUNCTURE: CPT

## 2023-03-29 RX ORDER — FUROSEMIDE 10 MG/ML
40 INJECTION INTRAMUSCULAR; INTRAVENOUS ONCE
Status: COMPLETED | OUTPATIENT
Start: 2023-03-29 | End: 2023-03-29

## 2023-03-29 RX ADMIN — FUROSEMIDE 40 MG: 10 INJECTION INTRAMUSCULAR; INTRAVENOUS at 11:58

## 2023-03-29 NOTE — PROGRESS NOTES
orthopaedic hardware ([facility] [address]    SSS (sick sinus syndrome) (HCC)    Observed sleep apnea    ASHD (arteriosclerotic heart disease)    Pacemaker    Chronic total occlusion of coronary artery    Ingrowing nail    Tinea unguium    Pain in left toe(s)    Other hammer toe(s) (acquired), left foot    CAD in native artery    Acute on chronic combined systolic and diastolic CHF (congestive heart failure) (HCC)    Acute pain of right shoulder    Chest pain    Acute on chronic congestive heart failure ([facility] [address]    Hypotension       Medications listed as ordered at the time of discharge from hospital     Medication List            Accurate as of March 29, 2023  6:46 PM. If you have any questions, ask your nurse or doctor.                 CHANGE how you take these medications      atorvastatin 80 MG tablet  Commonly known as: LIPITOR  Take 1 tablet by mouth nightly  What changed: when to take this     rOPINIRole 1 MG tablet  Commonly known as: Requip  Take 1 tablet by mouth nightly  What changed: when to take this            CONTINUE taking these medications      * albuterol (2.5 MG/3ML) 0.083% nebulizer solution  Commonly known as: PROVENTIL  Take 3 mLs by nebulization every 6 hours as needed for Wheezing or Shortness of Breath     * albuterol sulfate  (90 Base) MCG/ACT inhaler  Commonly known as: Proventil HFA  Inhale 2 puffs into the lungs every 4 hours as needed for Wheezing or Shortness of Breath     amitriptyline 10 MG tablet  Commonly known as: ELAVIL  TAKE ONE TABLET BY MOUTH ONCE NIGHTLY     aspirin 81 MG EC tablet  Take 1 tablet by mouth daily     benzonatate 200 MG capsule  Commonly known as: TESSALON  TAKE ONE CAPSULE BY MOUTH THREE TIMES A DAY AS NEEDED FOR COUGH     brexpiprazole 1 MG Tabs tablet  Commonly known as: REXULTI     clopidogrel 75 MG tablet  Commonly known as: PLAVIX  Take 1 tablet by mouth daily     Farxiga 10 MG tablet  Generic drug: dapagliflozin     fish oil 1000 MG capsule  Take 2 capsules by mouth

## 2023-03-31 ENCOUNTER — CARE COORDINATION (OUTPATIENT)
Dept: CASE MANAGEMENT | Age: 63
End: 2023-03-31

## 2023-03-31 ENCOUNTER — APPOINTMENT (OUTPATIENT)
Dept: CARDIAC REHAB | Age: 63
End: 2023-03-31
Payer: COMMERCIAL

## 2023-03-31 NOTE — CARE COORDINATION
[facility] Care Transitions Follow Up Call    Patient: Nicolasa Loving  Patient : 1960   MRN: [id number]  Reason for Admission: acute on chronic combined CHF, hypotension and ARF, CAD, hx CABG, chest pain, hx ischemic cardiomyopathy, SSS s/p PPM 2021, R shoulder pain-musculoskeletal in nature, leukocytosis, hyponatremia, metabolic syndrome with DM2 (A1C 7.7%), COPD with chronic hypoxic resp failure on 4lpm cont, hx tobacco abuse, morbid obesity -> home no services, resume cardiac rehab (?)  Discharge Date: 3/17/23 RARS: Readmission Risk Score: 13.6      CTN attempted follow up outreach to patient who did not answer at this time. Message left with CTN contact number requesting call back.      Follow Up  Future Appointments   Date Time Provider Riri Jara   4/10/2023  9:00 AM MMO CT RM 1 MHCZ MT [facility]   2023  9:00 AM Kiana Monzon MD [facility]   2023 11:35 AM SCHEDULE, Eduardo Patterson [facility]   2023  9:00 AM SCHEDULE, [facility]   2023  9:00 AM ANICETO Clarke - [facility]     Kitty Alonzo, RN  Care Transition Nurse  [phone] mobile

## 2023-04-03 ENCOUNTER — CARE COORDINATION (OUTPATIENT)
Dept: CASE MANAGEMENT | Age: 63
End: 2023-04-03

## 2023-04-03 NOTE — CARE COORDINATION
[facility] Care Transitions Follow Up Call    Patient Current Location: Home: [address] [place] [address]    Care Transition Nurse contacted the patient by telephone to follow up after recent hospital admission. Patient: Wes Meyer  Patient : 1960   MRN: [id number]  Reason for Admission: acute on chronic combined CHF, hypotension and ARF, CAD, hx CABG, chest pain, hx ischemic cardiomyopathy, SSS s/p PPM 2021, R shoulder pain-musculoskeletal in nature, leukocytosis, hyponatremia, metabolic syndrome with DM2 (A1C 7.7%), COPD with chronic hypoxic resp failure on 4lpm cont, hx tobacco abuse, morbid obesity -> home no services  Discharge Date: 3/17/23 RARS: Readmission Risk Score: 13.6      Needs to be reviewed by the provider   Additional needs identified to be addressed with provider: No         Method of communication with provider: none. Completed OV with PCP who stopped Ranexa, started Entresto, increased torsemide to 80 mg BID x 2 days and completed labwork and CXR with instruction to contact Dr Nathaniel Bragg on call this weekend if no improvement. States he feels no different on Entresto. O2 sats drop 81-82% in evenings. Wears O2 prn- note orders 4lpm cont. Today's weight 304#    Weight is fluctuating 304-315 and baseline 296-298. He has no energy. Sleeping 18hours day. Sees HF specialist tomorrow. Just walked back from mailbox was SOB. Did not check O2 sats. States BP 75/44 last night. He is hydrating to 64 ounces daily. He only increased the torsemide x 2 days as instructed. He also takes metolazone with torsemide on . Drinks more on this day because of excessive thirst. Reviewed restrictions. He voices understanding. States BS 42 also last night. Drank half glass OJ and ate candy. Raised to 186. Now over 300. Has FreeStyle Puneet now. KASHMIR Alcantar cardiology will admit him to [facility] tomorrow at [address] at 3:15pm. He is in no distress during call.  He has been in contact with cardiology and

## 2023-04-06 ENCOUNTER — CARE COORDINATION (OUTPATIENT)
Dept: CASE MANAGEMENT | Age: 63
End: 2023-04-06

## 2023-04-06 NOTE — CARE COORDINATION
[facility] Care Transitions Follow Up Call    Patient Current Location: Home: [address] [place] [address]    Care Transition Nurse contacted the patient by telephone to follow up after recent hospital admission. Patient: Leeanne Shipley  Patient : 1960   MRN: [id number]  Reason for Admission: acute on chronic combined CHF, hypotension and ARF, CAD, hx CABG, chest pain, hx ischemic cardiomyopathy, SSS s/p PPM 2021, R shoulder pain-musculoskeletal in nature, leukocytosis, hyponatremia, metabolic syndrome with DM2 (A1C 7.7%), COPD with chronic hypoxic resp failure on 4lpm cont, hx tobacco abuse, morbid obesity -> home no services  Discharge Date: 3/17/23 RARS: Readmission Risk Score: 13.6      Needs to be reviewed by the provider   Additional needs identified to be addressed with provider: No         Method of communication with provider: none. Goes to [facility] for procedure tomorrow - sounds like RHC. Depending on findings he may be admitted. Overall feels he is maintaining. Today's weight 305#    States cardiologist discontinued a med because of low BP but he doesn't know name. Wife manages meds. Denies needs at this time. CTN will f/up next week. Addressed changes since last contact:  none  Discussed follow-up appointments. If no appointment was previously scheduled, appointment scheduling offered: No.   Is follow up appointment scheduled within 7 days of discharge? completed.     Follow Up  Future Appointments   Date Time Provider Riri Jara   4/10/2023  9:00 AM MMO CT RM 1 MHCZ [facility]   2023  9:00 AM MD SHAHLA Raman [facility]   2023 11:35 AM SCHEDULE, Prema Nugent [facility]   2023  9:00 AM SCHEDULE, [facility] UpMo [facility]   2023  9:00 AM [facility]     [facility] follow up appointment(s): [facility] cardiology -     Care Transition Nurse reviewed medical action plan and red flags no

## 2023-04-17 DIAGNOSIS — K22.89 ESOPHAGEAL THICKENING: Primary | ICD-10-CM

## 2023-04-19 ENCOUNTER — OFFICE VISIT (OUTPATIENT)
Dept: PULMONOLOGY | Age: 63
End: 2023-04-19
Payer: COMMERCIAL

## 2023-04-19 ENCOUNTER — TELEPHONE (OUTPATIENT)
Dept: PULMONOLOGY | Age: 63
End: 2023-04-19

## 2023-04-19 VITALS
RESPIRATION RATE: 16 BRPM | BODY MASS INDEX: 52.58 KG/M2 | HEIGHT: 64 IN | HEART RATE: 93 BPM | WEIGHT: 308 LBS | SYSTOLIC BLOOD PRESSURE: 114 MMHG | DIASTOLIC BLOOD PRESSURE: 68 MMHG | OXYGEN SATURATION: 93 %

## 2023-04-19 DIAGNOSIS — G47.33 SEVERE OBSTRUCTIVE SLEEP APNEA: ICD-10-CM

## 2023-04-19 DIAGNOSIS — J44.9 MODERATE COPD (CHRONIC OBSTRUCTIVE PULMONARY DISEASE) (HCC): ICD-10-CM

## 2023-04-19 DIAGNOSIS — R93.89 ABNORMAL CT OF THE CHEST: Primary | ICD-10-CM

## 2023-04-19 PROCEDURE — 99214 OFFICE O/P EST MOD 30 MIN: CPT | Performed by: INTERNAL MEDICINE

## 2023-04-19 PROCEDURE — 1036F TOBACCO NON-USER: CPT | Performed by: INTERNAL MEDICINE

## 2023-04-19 PROCEDURE — G8427 DOCREV CUR MEDS BY ELIG CLIN: HCPCS | Performed by: INTERNAL MEDICINE

## 2023-04-19 PROCEDURE — G8417 CALC BMI ABV UP PARAM F/U: HCPCS | Performed by: INTERNAL MEDICINE

## 2023-04-19 PROCEDURE — 3017F COLORECTAL CA SCREEN DOC REV: CPT | Performed by: INTERNAL MEDICINE

## 2023-04-19 PROCEDURE — 3023F SPIROM DOC REV: CPT | Performed by: INTERNAL MEDICINE

## 2023-04-19 RX ORDER — ALBUTEROL SULFATE 2.5 MG/3ML
2.5 SOLUTION RESPIRATORY (INHALATION) EVERY 6 HOURS PRN
Qty: 360 EACH | Refills: 5 | Status: SHIPPED | OUTPATIENT
Start: 2023-04-19

## 2023-04-19 RX ORDER — ALBUTEROL SULFATE 90 UG/1
2 AEROSOL, METERED RESPIRATORY (INHALATION) EVERY 4 HOURS PRN
Qty: 3 EACH | Refills: 1 | Status: SHIPPED | OUTPATIENT
Start: 2023-04-19

## 2023-04-19 RX ORDER — FLUTICASONE FUROATE, UMECLIDINIUM BROMIDE AND VILANTEROL TRIFENATATE 100; 62.5; 25 UG/1; UG/1; UG/1
1 POWDER RESPIRATORY (INHALATION) DAILY
Qty: 3 EACH | Refills: 1 | Status: SHIPPED | OUTPATIENT
Start: 2023-04-19

## 2023-04-19 RX ORDER — SACUBITRIL AND VALSARTAN 24; 26 MG/1; MG/1
TABLET, FILM COATED ORAL
COMMUNITY
Start: 2023-03-23

## 2023-04-19 ASSESSMENT — SLEEP AND FATIGUE QUESTIONNAIRES
HOW LIKELY ARE YOU TO NOD OFF OR FALL ASLEEP WHILE SITTING QUIETLY AFTER LUNCH WITHOUT ALCOHOL: 2
HOW LIKELY ARE YOU TO NOD OFF OR FALL ASLEEP WHILE SITTING AND TALKING TO SOMEONE: 2
HOW LIKELY ARE YOU TO NOD OFF OR FALL ASLEEP WHILE LYING DOWN TO REST IN THE AFTERNOON WHEN CIRCUMSTANCES PERMIT: 3
HOW LIKELY ARE YOU TO NOD OFF OR FALL ASLEEP WHILE SITTING INACTIVE IN A PUBLIC PLACE: 2
HOW LIKELY ARE YOU TO NOD OFF OR FALL ASLEEP IN A CAR, WHILE STOPPED FOR A FEW MINUTES IN TRAFFIC: 0
NECK CIRCUMFERENCE (INCHES): 19
HOW LIKELY ARE YOU TO NOD OFF OR FALL ASLEEP WHEN YOU ARE A PASSENGER IN A CAR FOR AN HOUR WITHOUT A BREAK: 3
HOW LIKELY ARE YOU TO NOD OFF OR FALL ASLEEP WHILE WATCHING TV: 2
ESS TOTAL SCORE: 17
HOW LIKELY ARE YOU TO NOD OFF OR FALL ASLEEP WHILE SITTING AND READING: 3

## 2023-04-19 NOTE — PROGRESS NOTES
interval between PCV15 and PPSV23 is at least 1 year.    Advised to continue with smoking cessation  Follow up in 6 months or sooner if needed

## 2023-04-19 NOTE — PATIENT INSTRUCTIONS
[id number] Radha Cue Pulmonary, Sleep and [address], MD  [address] [place] [address] [address], [address], [address] [facility]  [phone]

## 2023-04-20 ENCOUNTER — CARE COORDINATION (OUTPATIENT)
Dept: CASE MANAGEMENT | Age: 63
End: 2023-04-20

## 2023-04-20 NOTE — CARE COORDINATION
[facility] Care Transitions Follow Up Call      Patient: Landon Laboy  Patient : 1960   MRN: [id number]  Reason for Admission: acute on chronic combined CHF, hypotension and ARF, CAD, hx CABG, chest pain, hx ischemic cardiomyopathy, SSS s/p PPM 2021, R shoulder pain-musculoskeletal in nature, leukocytosis, hyponatremia, metabolic syndrome with DM2 (A1C 7.7%), COPD with chronic hypoxic resp failure on 4lpm cont, hx tobacco abuse, morbid obesity -> home no services  Discharge Date: 3/17/23 RARS: Readmission Risk Score: 13.6      CTN attempted follow-up outreach to patient. Message left including CTN contact information.      Follow Up  Future Appointments   Date Time Provider Riri Jara   2023 11:35 AM SCHEDULE, Rylee Longoria [facility]   2023  9:00 AM SCHEDULE, [facility]onda [facility]   2023  9:00 AM Sari Mix APRN - CNP [facility]   10/17/2023  9:00 AM MMO CT RM 1 MHCZ [facility]   10/19/2023  8:45 AM MD SHAHLA Montes [facility]     Syeda Marks, RN  Care Transition Nurse  [phone] mobile

## 2023-04-21 ENCOUNTER — CARE COORDINATION (OUTPATIENT)
Dept: CASE MANAGEMENT | Age: 63
End: 2023-04-21

## 2023-04-21 ENCOUNTER — TELEPHONE (OUTPATIENT)
Dept: PULMONOLOGY | Age: 63
End: 2023-04-21

## 2023-04-21 ENCOUNTER — NURSE ONLY (OUTPATIENT)
Dept: FAMILY MEDICINE CLINIC | Age: 63
End: 2023-04-21
Payer: COMMERCIAL

## 2023-04-21 DIAGNOSIS — Z23 NEED FOR SHINGLES VACCINE: ICD-10-CM

## 2023-04-21 DIAGNOSIS — Z23 NEED FOR PNEUMOCOCCAL VACCINATION: Primary | ICD-10-CM

## 2023-04-21 PROCEDURE — 90471 IMMUNIZATION ADMIN: CPT | Performed by: NURSE PRACTITIONER

## 2023-04-21 PROCEDURE — 90750 HZV VACC RECOMBINANT IM: CPT | Performed by: NURSE PRACTITIONER

## 2023-04-21 PROCEDURE — 90472 IMMUNIZATION ADMIN EACH ADD: CPT | Performed by: NURSE PRACTITIONER

## 2023-04-21 PROCEDURE — 90677 PCV20 VACCINE IM: CPT | Performed by: NURSE PRACTITIONER

## 2023-04-21 NOTE — CARE COORDINATION
[address] Care Transitions Follow Up Call    Patient Current Location: [address]  Transition Nurse contacted the patienttelephone to follow up after recent hospital admission. Patient: Susan Coello  Patient : 1960   MRN: [id number]  Reason for Admission: acute on chronic combined CHF, hypotension and ARF, CAD, hx CABG, chest pain, hx ischemic cardiomyopathy, SSS s/p PPM 2021, R shoulder pain-musculoskeletal in nature, leukocytosis, hyponatremia, metabolic syndrome with DM2 (A1C 7.7%), COPD with chronic hypoxic resp failure on 4lpm cont, hx tobacco abuse, morbid obesity -> home no services  Discharge Date: 3/17/23 RARS: Readmission Risk Score: 13.6      Needs to be reviewed by the provider   Additional needs identified to be addressed with provider: No         Method of communication with provider: none. Reports he is feeling good. Weight stable at 305#. Trying to lose weight but no luck. Sees GI about hiatal hernia next month. States he had nurse visit at pul today for pneumonia vaccine and shingles vaccine. CHF education:  -if you take a water pill - do not skip doses  -weigh daily in the morning at the same time and in the same clothing  -report weight gain of >3#/day or >5#/week  -report increased SOB, edema, abd fullness, difficulty lying flat  -report palpitations, cough, difficulty urinating  -reviewed salt and fluid restrictions      States he feels better than he has in years. Moving around more and spending time outside. Thinking about getting motorcycle out. Denies needs. Addressed changes since last contact:  none  Discussed follow-up appointments. If no appointment was previously scheduled, appointment scheduling offered: no.   Is follow up appointment scheduled within 7 days of discharge? Yes.     Follow Up  Future Appointments   Date Time Provider Riri Jara   2023 11:35 AM SCHEDULE, Javan Hernández [facility]   2023  9:00 AM SCHEDULE,

## 2023-04-25 ENCOUNTER — NURSE ONLY (OUTPATIENT)
Dept: CARDIOLOGY CLINIC | Age: 63
End: 2023-04-25

## 2023-04-25 DIAGNOSIS — Z95.0 PACEMAKER: ICD-10-CM

## 2023-04-25 DIAGNOSIS — R00.1 SINUS BRADYCARDIA: ICD-10-CM

## 2023-04-25 DIAGNOSIS — I49.5 SSS (SICK SINUS SYNDROME) (HCC): ICD-10-CM

## 2023-04-25 DIAGNOSIS — I44.1 MOBITZ TYPE I WENCKEBACH ATRIOVENTRICULAR BLOCK: ICD-10-CM

## 2023-04-25 DIAGNOSIS — I25.5 ISCHEMIC CARDIOMYOPATHY: ICD-10-CM

## 2023-04-25 DIAGNOSIS — I44.2 CHB (COMPLETE HEART BLOCK) (HCC): Primary | ICD-10-CM

## 2023-04-28 RX ORDER — ROPINIROLE 1 MG/1
TABLET, FILM COATED ORAL
Qty: 30 TABLET | Refills: 2 | Status: SHIPPED | OUTPATIENT
Start: 2023-04-28

## 2023-04-28 NOTE — TELEPHONE ENCOUNTER
Cornell Chadwick is requesting refill(s)   Last OV 3- (pertaining to medication)  LR 1- (per medication requested)  Next office visit scheduled or attempted No   If no, reason:  LMOR to set up follow up

## 2023-05-02 NOTE — PROGRESS NOTES
End of 91-day monitoring period 4/25  Pacing (% of Time Since 24-Jan-2023)   4.9% (MVP On)  AP 0.5%  PAT and NSVT recorded. (Toprol xl)    Echo 12/2022-EF 49%    Remote transmission received for patient's dual chamber PACEMAKER. Transmission shows normal sensing and pacing function. EP physician will review. See interrogation under the cardiology tab in the [address] [place] [address] [address] field for more details. Will continue to monitor remotely.

## 2023-05-30 RX ORDER — AMITRIPTYLINE HYDROCHLORIDE 10 MG/1
10 TABLET, FILM COATED ORAL NIGHTLY
Qty: 30 TABLET | Refills: 1 | OUTPATIENT
Start: 2023-05-30

## 2023-05-30 RX ORDER — AMITRIPTYLINE HYDROCHLORIDE 10 MG/1
TABLET, FILM COATED ORAL
Qty: 30 TABLET | Refills: 1 | Status: SHIPPED
Start: 2023-05-30 | End: 2023-07-12 | Stop reason: DRUGHIGH

## 2023-06-26 ENCOUNTER — ANESTHESIA EVENT (OUTPATIENT)
Dept: ENDOSCOPY | Age: 63
End: 2023-06-26
Payer: COMMERCIAL

## 2023-06-26 RX ORDER — LIDOCAINE PAIN RELIEF 40 MG/1000MG
PATCH TOPICAL
Qty: 15 PATCH | Refills: 0 | OUTPATIENT
Start: 2023-06-26

## 2023-06-29 ENCOUNTER — ANESTHESIA (OUTPATIENT)
Dept: ENDOSCOPY | Age: 63
End: 2023-06-29
Payer: COMMERCIAL

## 2023-06-29 ENCOUNTER — HOSPITAL ENCOUNTER (OUTPATIENT)
Age: 63
Setting detail: OUTPATIENT SURGERY
Discharge: HOME | End: 2023-06-29
Attending: INTERNAL MEDICINE | Admitting: INTERNAL MEDICINE
Payer: COMMERCIAL

## 2023-06-29 VITALS
HEART RATE: 80 BPM | RESPIRATION RATE: 16 BRPM | OXYGEN SATURATION: 94 % | TEMPERATURE: 97.8 F | WEIGHT: 311 LBS | HEIGHT: 64 IN | BODY MASS INDEX: 53.1 KG/M2 | DIASTOLIC BLOOD PRESSURE: 81 MMHG | SYSTOLIC BLOOD PRESSURE: 137 MMHG

## 2023-06-29 DIAGNOSIS — Z12.11 SPECIAL SCREENING FOR MALIGNANT NEOPLASMS, COLON: ICD-10-CM

## 2023-06-29 DIAGNOSIS — R93.2 ABNORMAL CT OF LIVER: ICD-10-CM

## 2023-06-29 LAB
GLUCOSE BLD-MCNC: 207 MG/DL (ref 70–99)
PERFORMED ON: ABNORMAL

## 2023-06-29 PROCEDURE — 3609012400 HC EGD TRANSORAL BIOPSY SINGLE/MULTIPLE: Performed by: INTERNAL MEDICINE

## 2023-06-29 PROCEDURE — 7100000011 HC PHASE II RECOVERY - ADDTL 15 MIN: Performed by: INTERNAL MEDICINE

## 2023-06-29 PROCEDURE — 88305 TISSUE EXAM BY PATHOLOGIST: CPT

## 2023-06-29 PROCEDURE — 3609027000 HC COLONOSCOPY: Performed by: INTERNAL MEDICINE

## 2023-06-29 PROCEDURE — 2709999900 HC NON-CHARGEABLE SUPPLY: Performed by: INTERNAL MEDICINE

## 2023-06-29 PROCEDURE — 2500000003 HC RX 250 WO HCPCS: Performed by: NURSE ANESTHETIST, CERTIFIED REGISTERED

## 2023-06-29 PROCEDURE — 3700000001 HC ADD 15 MINUTES (ANESTHESIA): Performed by: INTERNAL MEDICINE

## 2023-06-29 PROCEDURE — 7100000010 HC PHASE II RECOVERY - FIRST 15 MIN: Performed by: INTERNAL MEDICINE

## 2023-06-29 PROCEDURE — 6360000002 HC RX W HCPCS: Performed by: NURSE ANESTHETIST, CERTIFIED REGISTERED

## 2023-06-29 PROCEDURE — 3700000000 HC ANESTHESIA ATTENDED CARE: Performed by: INTERNAL MEDICINE

## 2023-06-29 RX ORDER — OXYCODONE HYDROCHLORIDE 5 MG/1
5 TABLET ORAL PRN
Status: CANCELLED | OUTPATIENT
Start: 2023-06-29 | End: 2023-06-29

## 2023-06-29 RX ORDER — SODIUM CHLORIDE 0.9 % (FLUSH) 0.9 %
5-40 SYRINGE (ML) INJECTION PRN
Status: CANCELLED | OUTPATIENT
Start: 2023-06-29

## 2023-06-29 RX ORDER — SODIUM CHLORIDE 9 MG/ML
INJECTION, SOLUTION INTRAVENOUS PRN
Status: DISCONTINUED | OUTPATIENT
Start: 2023-06-29 | End: 2023-06-29 | Stop reason: HOSPADM

## 2023-06-29 RX ORDER — SODIUM CHLORIDE, SODIUM LACTATE, POTASSIUM CHLORIDE, CALCIUM CHLORIDE 600; 310; 30; 20 MG/100ML; MG/100ML; MG/100ML; MG/100ML
INJECTION, SOLUTION INTRAVENOUS CONTINUOUS
Status: DISCONTINUED | OUTPATIENT
Start: 2023-06-29 | End: 2023-06-29 | Stop reason: HOSPADM

## 2023-06-29 RX ORDER — SODIUM CHLORIDE 0.9 % (FLUSH) 0.9 %
5-40 SYRINGE (ML) INJECTION EVERY 12 HOURS SCHEDULED
Status: DISCONTINUED | OUTPATIENT
Start: 2023-06-29 | End: 2023-06-29 | Stop reason: HOSPADM

## 2023-06-29 RX ORDER — SODIUM CHLORIDE 9 MG/ML
INJECTION, SOLUTION INTRAVENOUS PRN
Status: CANCELLED | OUTPATIENT
Start: 2023-06-29

## 2023-06-29 RX ORDER — ONDANSETRON 2 MG/ML
4 INJECTION INTRAMUSCULAR; INTRAVENOUS
Status: CANCELLED | OUTPATIENT
Start: 2023-06-29 | End: 2023-06-30

## 2023-06-29 RX ORDER — OXYCODONE HYDROCHLORIDE 5 MG/1
10 TABLET ORAL PRN
Status: CANCELLED | OUTPATIENT
Start: 2023-06-29 | End: 2023-06-29

## 2023-06-29 RX ORDER — SODIUM CHLORIDE 0.9 % (FLUSH) 0.9 %
5-40 SYRINGE (ML) INJECTION EVERY 12 HOURS SCHEDULED
Status: CANCELLED | OUTPATIENT
Start: 2023-06-29

## 2023-06-29 RX ORDER — PROPOFOL 10 MG/ML
INJECTION, EMULSION INTRAVENOUS PRN
Status: DISCONTINUED | OUTPATIENT
Start: 2023-06-29 | End: 2023-06-29 | Stop reason: SDUPTHER

## 2023-06-29 RX ORDER — MEPERIDINE HYDROCHLORIDE 25 MG/ML
12.5 INJECTION INTRAMUSCULAR; INTRAVENOUS; SUBCUTANEOUS EVERY 5 MIN PRN
Status: CANCELLED | OUTPATIENT
Start: 2023-06-29

## 2023-06-29 RX ORDER — SODIUM CHLORIDE 0.9 % (FLUSH) 0.9 %
5-40 SYRINGE (ML) INJECTION PRN
Status: DISCONTINUED | OUTPATIENT
Start: 2023-06-29 | End: 2023-06-29 | Stop reason: HOSPADM

## 2023-06-29 RX ORDER — GLYCOPYRROLATE 0.2 MG/ML
INJECTION INTRAMUSCULAR; INTRAVENOUS PRN
Status: DISCONTINUED | OUTPATIENT
Start: 2023-06-29 | End: 2023-06-29 | Stop reason: SDUPTHER

## 2023-06-29 RX ORDER — FAMOTIDINE 10 MG/ML
20 INJECTION, SOLUTION INTRAVENOUS ONCE
Status: DISCONTINUED | OUTPATIENT
Start: 2023-06-29 | End: 2023-06-29 | Stop reason: HOSPADM

## 2023-06-29 RX ORDER — LIDOCAINE HYDROCHLORIDE 20 MG/ML
INJECTION, SOLUTION INFILTRATION; PERINEURAL PRN
Status: DISCONTINUED | OUTPATIENT
Start: 2023-06-29 | End: 2023-06-29 | Stop reason: SDUPTHER

## 2023-06-29 RX ADMIN — PROPOFOL 300 MG: 10 INJECTION, EMULSION INTRAVENOUS at 10:40

## 2023-06-29 RX ADMIN — GLYCOPYRROLATE 0.2 MG: 0.2 INJECTION, SOLUTION INTRAMUSCULAR; INTRAVENOUS at 10:40

## 2023-06-29 RX ADMIN — LIDOCAINE HYDROCHLORIDE 60 MG: 20 INJECTION, SOLUTION INFILTRATION; PERINEURAL at 10:40

## 2023-06-29 ASSESSMENT — LIFESTYLE VARIABLES: SMOKING_STATUS: 0

## 2023-06-29 ASSESSMENT — ENCOUNTER SYMPTOMS: SHORTNESS OF BREATH: 1

## 2023-06-29 ASSESSMENT — PAIN - FUNCTIONAL ASSESSMENT: PAIN_FUNCTIONAL_ASSESSMENT: 0-10

## 2023-07-11 NOTE — PROGRESS NOTES
because he can not use the CPAP      Hyperlipidemia:  No newmyalgias or GI upset on OTC Fish Oil. The ASCVD Risk score (Qian ZENDEJAS, et al., 2019) failed to calculate for the following reasons: The valid total cholesterol range is 130 to 320 mg/dL    Lab Results   Component Value Date    LABA1C 7.7 03/13/2023    LABA1C 6.3 11/11/2021    LABA1C 6.0 09/25/2020     Lab Results   Component Value Date    LABMICR Not Indicated 04/08/2021    LDLCALC 22 03/14/2023    CREATININE 1.0 03/29/2023     Restless Leg Syndrome  Patient has history of RLS with initial complaints of cramps and spasms in the lower extremities, especially at rest and at night. He reported waking up several times a night due to pain and cramping in the lower extremities that was relieved with walking. He denied any complaint during activities. These symptoms have been going on for quite sometime. He is currently on  requip 1 mg q hs  without side effects. Treatment has been not very effective    Mood Disorder:  Patient presents for follow-up of depression and anxiety disorder. Current complaints include: See PHQ9 below . He denies tearfulness, decreased libido, irritability, excessive worry, panic attacks, obsessive thoughts, compulsive behaviors, increased use of drugs or alcohol, suicidal thoughts or behavior, and impaired memory. Symptoms/signs of lev: none. External stressors: illness or family illness. Current treatment includes: rexulti 1 mg q hs and elavil 10 mg nightly. --- Patient quit seeing the psychiatrist and has not been on [employer] for some time. --approx 1 year according to medication dispense report. But  Patient is currently taking Elavil 10 mg nightly medication side effects: none.     PHQ-9  7/12/2023 3/3/2023 2/8/2023 1/12/2023 11/23/2022 6/29/2022 11/11/2021   Little interest or pleasure in doing things 3 3 3 3 0 2 3   Feeling down, depressed, or hopeless 0 1 0 2 2 1 3   Trouble falling or staying asleep, or

## 2023-07-12 ENCOUNTER — OFFICE VISIT (OUTPATIENT)
Dept: FAMILY MEDICINE CLINIC | Age: 63
End: 2023-07-12
Payer: COMMERCIAL

## 2023-07-12 VITALS
DIASTOLIC BLOOD PRESSURE: 64 MMHG | BODY MASS INDEX: 51.9 KG/M2 | HEIGHT: 64 IN | OXYGEN SATURATION: 97 % | SYSTOLIC BLOOD PRESSURE: 122 MMHG | HEART RATE: 74 BPM | WEIGHT: 304 LBS

## 2023-07-12 DIAGNOSIS — E11.9 TYPE 2 DIABETES MELLITUS WITHOUT COMPLICATION, WITHOUT LONG-TERM CURRENT USE OF INSULIN (HCC): ICD-10-CM

## 2023-07-12 DIAGNOSIS — E78.2 MIXED HYPERLIPIDEMIA: ICD-10-CM

## 2023-07-12 DIAGNOSIS — E66.01 MORBID OBESITY WITH BODY MASS INDEX (BMI) OF 50.0 TO 59.9 IN ADULT (HCC): Primary | ICD-10-CM

## 2023-07-12 DIAGNOSIS — F41.9 ANXIETY: ICD-10-CM

## 2023-07-12 DIAGNOSIS — I50.42 CHRONIC COMBINED SYSTOLIC AND DIASTOLIC CONGESTIVE HEART FAILURE (HCC): ICD-10-CM

## 2023-07-12 DIAGNOSIS — J44.9 MODERATE COPD (CHRONIC OBSTRUCTIVE PULMONARY DISEASE) (HCC): ICD-10-CM

## 2023-07-12 DIAGNOSIS — E55.9 VITAMIN D DEFICIENCY: ICD-10-CM

## 2023-07-12 DIAGNOSIS — I25.10 CAD IN NATIVE ARTERY: ICD-10-CM

## 2023-07-12 DIAGNOSIS — F32.9 REACTIVE DEPRESSION: ICD-10-CM

## 2023-07-12 DIAGNOSIS — Z13.31 POSITIVE DEPRESSION SCREENING: ICD-10-CM

## 2023-07-12 DIAGNOSIS — R25.2 MUSCLE CRAMPS: ICD-10-CM

## 2023-07-12 DIAGNOSIS — G25.81 RESTLESS LEG: ICD-10-CM

## 2023-07-12 PROCEDURE — 3017F COLORECTAL CA SCREEN DOC REV: CPT | Performed by: NURSE PRACTITIONER

## 2023-07-12 PROCEDURE — 3023F SPIROM DOC REV: CPT | Performed by: NURSE PRACTITIONER

## 2023-07-12 PROCEDURE — 1036F TOBACCO NON-USER: CPT | Performed by: NURSE PRACTITIONER

## 2023-07-12 PROCEDURE — G8427 DOCREV CUR MEDS BY ELIG CLIN: HCPCS | Performed by: NURSE PRACTITIONER

## 2023-07-12 PROCEDURE — 36415 COLL VENOUS BLD VENIPUNCTURE: CPT | Performed by: NURSE PRACTITIONER

## 2023-07-12 PROCEDURE — 3051F HG A1C>EQUAL 7.0%<8.0%: CPT | Performed by: NURSE PRACTITIONER

## 2023-07-12 PROCEDURE — 2022F DILAT RTA XM EVC RTNOPTHY: CPT | Performed by: NURSE PRACTITIONER

## 2023-07-12 PROCEDURE — G8417 CALC BMI ABV UP PARAM F/U: HCPCS | Performed by: NURSE PRACTITIONER

## 2023-07-12 PROCEDURE — 99214 OFFICE O/P EST MOD 30 MIN: CPT | Performed by: NURSE PRACTITIONER

## 2023-07-12 RX ORDER — ROPINIROLE 2 MG/1
2 TABLET, FILM COATED ORAL NIGHTLY
Qty: 90 TABLET | Refills: 3 | Status: SHIPPED | OUTPATIENT
Start: 2023-07-12

## 2023-07-12 RX ORDER — AMITRIPTYLINE HYDROCHLORIDE 25 MG/1
25 TABLET, FILM COATED ORAL NIGHTLY
Qty: 30 TABLET | Refills: 5 | Status: SHIPPED | OUTPATIENT
Start: 2023-07-12

## 2023-07-12 RX ORDER — DESVENLAFAXINE SUCCINATE 50 MG/1
50 TABLET, EXTENDED RELEASE ORAL DAILY
Qty: 30 TABLET | Refills: 3 | Status: SHIPPED | OUTPATIENT
Start: 2023-07-12

## 2023-07-12 ASSESSMENT — PATIENT HEALTH QUESTIONNAIRE - PHQ9
SUM OF ALL RESPONSES TO PHQ9 QUESTIONS 1 & 2: 3
5. POOR APPETITE OR OVEREATING: 1
9. THOUGHTS THAT YOU WOULD BE BETTER OFF DEAD, OR OF HURTING YOURSELF: 0
2. FEELING DOWN, DEPRESSED OR HOPELESS: 0
6. FEELING BAD ABOUT YOURSELF - OR THAT YOU ARE A FAILURE OR HAVE LET YOURSELF OR YOUR FAMILY DOWN: 0
10. IF YOU CHECKED OFF ANY PROBLEMS, HOW DIFFICULT HAVE THESE PROBLEMS MADE IT FOR YOU TO DO YOUR WORK, TAKE CARE OF THINGS AT HOME, OR GET ALONG WITH OTHER PEOPLE: 1
8. MOVING OR SPEAKING SO SLOWLY THAT OTHER PEOPLE COULD HAVE NOTICED. OR THE OPPOSITE, BEING SO FIGETY OR RESTLESS THAT YOU HAVE BEEN MOVING AROUND A LOT MORE THAN USUAL: 1
4. FEELING TIRED OR HAVING LITTLE ENERGY: 3
3. TROUBLE FALLING OR STAYING ASLEEP: 3
SUM OF ALL RESPONSES TO PHQ QUESTIONS 1-9: 13
1. LITTLE INTEREST OR PLEASURE IN DOING THINGS: 3
SUM OF ALL RESPONSES TO PHQ QUESTIONS 1-9: 13
7. TROUBLE CONCENTRATING ON THINGS, SUCH AS READING THE NEWSPAPER OR WATCHING TELEVISION: 2

## 2023-07-12 ASSESSMENT — ENCOUNTER SYMPTOMS
ALLERGIC/IMMUNOLOGIC NEGATIVE: 1
ABDOMINAL PAIN: 0
SHORTNESS OF BREATH: 1
EYES NEGATIVE: 1
BACK PAIN: 1
NAUSEA: 0
GASTROINTESTINAL NEGATIVE: 1
BLOOD IN STOOL: 0
ANAL BLEEDING: 0
COUGH: 1

## 2023-07-12 ASSESSMENT — ANXIETY QUESTIONNAIRES
IF YOU CHECKED OFF ANY PROBLEMS ON THIS QUESTIONNAIRE, HOW DIFFICULT HAVE THESE PROBLEMS MADE IT FOR YOU TO DO YOUR WORK, TAKE CARE OF THINGS AT HOME, OR GET ALONG WITH OTHER PEOPLE: EXTREMELY DIFFICULT
2. NOT BEING ABLE TO STOP OR CONTROL WORRYING: 3
6. BECOMING EASILY ANNOYED OR IRRITABLE: 3
1. FEELING NERVOUS, ANXIOUS, OR ON EDGE: 3
3. WORRYING TOO MUCH ABOUT DIFFERENT THINGS: 3
5. BEING SO RESTLESS THAT IT IS HARD TO SIT STILL: 3
GAD7 TOTAL SCORE: 20
4. TROUBLE RELAXING: 3
7. FEELING AFRAID AS IF SOMETHING AWFUL MIGHT HAPPEN: 2

## 2023-07-13 LAB
ANION GAP SERPL CALCULATED.3IONS-SCNC: 25 MMOL/L (ref 3–16)
BUN SERPL-MCNC: 14 MG/DL (ref 7–20)
CALCIUM SERPL-MCNC: 9.7 MG/DL (ref 8.3–10.6)
CHLORIDE SERPL-SCNC: 88 MMOL/L (ref 99–110)
CO2 SERPL-SCNC: 23 MMOL/L (ref 21–32)
CREAT SERPL-MCNC: 1 MG/DL (ref 0.8–1.3)
EST. AVERAGE GLUCOSE BLD GHB EST-MCNC: 257.5 MG/DL
GFR SERPLBLD CREATININE-BSD FMLA CKD-EPI: >60 ML/MIN/{1.73_M2}
GLUCOSE SERPL-MCNC: 437 MG/DL (ref 70–99)
HBA1C MFR BLD: 10.6 %
MAGNESIUM SERPL-MCNC: 2.1 MG/DL (ref 1.8–2.4)
POTASSIUM SERPL-SCNC: 3.8 MMOL/L (ref 3.5–5.1)
SODIUM SERPL-SCNC: 136 MMOL/L (ref 136–145)

## 2023-07-13 RX ORDER — METFORMIN HYDROCHLORIDE 500 MG/1
1000 TABLET, EXTENDED RELEASE ORAL
Qty: 60 TABLET | Refills: 2 | Status: SHIPPED | OUTPATIENT
Start: 2023-07-13 | End: 2023-07-14 | Stop reason: SDUPTHER

## 2023-07-13 RX ORDER — DULAGLUTIDE 0.75 MG/.5ML
0.75 INJECTION, SOLUTION SUBCUTANEOUS WEEKLY
Qty: 4 ADJUSTABLE DOSE PRE-FILLED PEN SYRINGE | Refills: 0 | Status: SHIPPED | OUTPATIENT
Start: 2023-07-13 | End: 2023-07-14 | Stop reason: SDUPTHER

## 2023-07-14 ENCOUNTER — TELEPHONE (OUTPATIENT)
Dept: ADMINISTRATIVE | Age: 63
End: 2023-07-14

## 2023-07-14 DIAGNOSIS — E11.9 TYPE 2 DIABETES MELLITUS WITHOUT COMPLICATION, WITHOUT LONG-TERM CURRENT USE OF INSULIN (HCC): Primary | ICD-10-CM

## 2023-07-14 RX ORDER — METFORMIN HYDROCHLORIDE 500 MG/1
1000 TABLET, EXTENDED RELEASE ORAL
Qty: 60 TABLET | Refills: 2 | Status: SHIPPED | OUTPATIENT
Start: 2023-07-14

## 2023-07-14 RX ORDER — DULAGLUTIDE 0.75 MG/.5ML
0.75 INJECTION, SOLUTION SUBCUTANEOUS WEEKLY
Qty: 4 ADJUSTABLE DOSE PRE-FILLED PEN SYRINGE | Refills: 0 | Status: SHIPPED
Start: 2023-07-14 | End: 2023-08-02 | Stop reason: ALTCHOICE

## 2023-07-14 NOTE — TELEPHONE ENCOUNTER
----- Message from ANICETO Myers CNP sent at 7/13/2023  6:24 PM EDT -----  Magnesium level is normal  Kidney function and electrolytes look fine  Diabetes is very uncontrolled with a hemoglobin A1c of 10.6  Continue farxiga 10 mg daily  I did check to see which weekly injections were covered and it appears that Trulicity is covered. Patient will start on 0.75 mg weekly and then after 1 month he will need to have a kidney function tested and if he is doing well then the medication will be increased. He will need to come in and see the nurse for education on how to give his Trulicity  Also recommend that the patient start metformin  mg 2 tabs with breakfast #60 with 2 refills--I sent the prescriptions to [address] [place] [address] which was marked on his chart however that if this is incorrect then please pend the medications to the correct pharmacy  Will need to have an A1c in 3 months.   If he is going to see the endocrinologist he can follow-up with them otherwise he will need to see me for follow-up in 1 month

## 2023-07-14 NOTE — TELEPHONE ENCOUNTER
Spoke with patient. Advised on message. Voiced understanding. Please send the medications Metformin XR and the Trulicity to [facility] in [place]. He will call office when he picks up the medication. Will schedule a one month follow up for a BMP after one month of start of the Trulicity and a 3 month A1C after starting the Metformin. He has an appt scheduled with a Endocrinologist at [facility] on 7/27/2023.   Will route to Erick Mazariegos to contact patient for the Trulicity education

## 2023-07-17 ENCOUNTER — APPOINTMENT (OUTPATIENT)
Dept: CT IMAGING | Age: 63
DRG: 420 | End: 2023-07-17
Payer: COMMERCIAL

## 2023-07-17 ENCOUNTER — TELEPHONE (OUTPATIENT)
Dept: FAMILY MEDICINE CLINIC | Age: 63
End: 2023-07-17

## 2023-07-17 ENCOUNTER — HOSPITAL ENCOUNTER (INPATIENT)
Age: 63
LOS: 3 days | Discharge: HOME | DRG: 420 | End: 2023-07-20
Attending: EMERGENCY MEDICINE | Admitting: INTERNAL MEDICINE
Payer: COMMERCIAL

## 2023-07-17 ENCOUNTER — APPOINTMENT (OUTPATIENT)
Dept: GENERAL RADIOLOGY | Age: 63
DRG: 420 | End: 2023-07-17
Payer: COMMERCIAL

## 2023-07-17 DIAGNOSIS — R20.0 LEFT LEG NUMBNESS: ICD-10-CM

## 2023-07-17 DIAGNOSIS — E11.649 UNCONTROLLED TYPE 2 DIABETES MELLITUS WITH HYPOGLYCEMIA WITHOUT COMA (HCC): ICD-10-CM

## 2023-07-17 DIAGNOSIS — R06.02 SHORTNESS OF BREATH: ICD-10-CM

## 2023-07-17 DIAGNOSIS — J18.9 PNEUMONIA OF LEFT UPPER LOBE DUE TO INFECTIOUS ORGANISM: Primary | ICD-10-CM

## 2023-07-17 DIAGNOSIS — E86.0 DEHYDRATION: ICD-10-CM

## 2023-07-17 DIAGNOSIS — R42 DIZZINESS: ICD-10-CM

## 2023-07-17 DIAGNOSIS — R51.9 ACUTE NONINTRACTABLE HEADACHE, UNSPECIFIED HEADACHE TYPE: ICD-10-CM

## 2023-07-17 DIAGNOSIS — R09.02 HYPOXIA: ICD-10-CM

## 2023-07-17 DIAGNOSIS — R53.1 GENERALIZED WEAKNESS: ICD-10-CM

## 2023-07-17 DIAGNOSIS — R68.89 ILL FEELING: ICD-10-CM

## 2023-07-17 DIAGNOSIS — H53.8 BLURRED VISION, BILATERAL: ICD-10-CM

## 2023-07-17 DIAGNOSIS — R10.9 ABDOMINAL PAIN WITH VOMITING: ICD-10-CM

## 2023-07-17 DIAGNOSIS — E87.20 LACTIC ACID ACIDOSIS: ICD-10-CM

## 2023-07-17 DIAGNOSIS — R11.10 ABDOMINAL PAIN WITH VOMITING: ICD-10-CM

## 2023-07-17 LAB
ALBUMIN SERPL-MCNC: 4 G/DL (ref 3.4–5)
ALBUMIN/GLOB SERPL: 1.1 {RATIO} (ref 1.1–2.2)
ALP SERPL-CCNC: 121 U/L (ref 40–129)
ALT SERPL-CCNC: 50 U/L (ref 10–40)
ANION GAP SERPL CALCULATED.3IONS-SCNC: 15 MMOL/L (ref 3–16)
AST SERPL-CCNC: 48 U/L (ref 15–37)
BASOPHILS # BLD: 0.1 K/UL (ref 0–0.2)
BASOPHILS NFR BLD: 0.5 %
BILIRUB SERPL-MCNC: 1 MG/DL (ref 0–1)
BILIRUB UR QL STRIP.AUTO: NEGATIVE
BUN SERPL-MCNC: 16 MG/DL (ref 7–20)
CALCIUM SERPL-MCNC: 10.7 MG/DL (ref 8.3–10.6)
CHLORIDE SERPL-SCNC: 86 MMOL/L (ref 99–110)
CLARITY UR: CLEAR
CO2 SERPL-SCNC: 32 MMOL/L (ref 21–32)
COLOR UR: YELLOW
CREAT SERPL-MCNC: 1 MG/DL (ref 0.8–1.3)
DEPRECATED RDW RBC AUTO: 15.4 % (ref 12.4–15.4)
EOSINOPHIL # BLD: 0.1 K/UL (ref 0–0.6)
EOSINOPHIL NFR BLD: 1.2 %
GFR SERPLBLD CREATININE-BSD FMLA CKD-EPI: >60 ML/MIN/{1.73_M2}
GLUCOSE BLD-MCNC: 271 MG/DL (ref 70–99)
GLUCOSE BLD-MCNC: 298 MG/DL (ref 70–99)
GLUCOSE BLD-MCNC: 396 MG/DL (ref 70–99)
GLUCOSE SERPL-MCNC: 416 MG/DL (ref 70–99)
GLUCOSE UR STRIP.AUTO-MCNC: >=1000 MG/DL
HCT VFR BLD AUTO: 55 % (ref 40.5–52.5)
HGB BLD-MCNC: 17.9 G/DL (ref 13.5–17.5)
HGB UR QL STRIP.AUTO: NEGATIVE
KETONES UR STRIP.AUTO-MCNC: NEGATIVE MG/DL
LACTATE BLDV-SCNC: 2.4 MMOL/L (ref 0.4–2)
LACTATE BLDV-SCNC: 2.9 MMOL/L (ref 0.4–2)
LACTATE BLDV-SCNC: 3.4 MMOL/L (ref 0.4–1.9)
LEUKOCYTE ESTERASE UR QL STRIP.AUTO: NEGATIVE
LYMPHOCYTES # BLD: 1.6 K/UL (ref 1–5.1)
LYMPHOCYTES NFR BLD: 13.3 %
MAGNESIUM SERPL-MCNC: 2 MG/DL (ref 1.8–2.4)
MAGNESIUM SERPL-MCNC: 2 MG/DL (ref 1.8–2.4)
MCH RBC QN AUTO: 27.7 PG (ref 26–34)
MCHC RBC AUTO-ENTMCNC: 32.5 G/DL (ref 31–36)
MCV RBC AUTO: 85.1 FL (ref 80–100)
MONOCYTES # BLD: 0.6 K/UL (ref 0–1.3)
MONOCYTES NFR BLD: 5.3 %
NEUTROPHILS # BLD: 9.8 K/UL (ref 1.7–7.7)
NEUTROPHILS NFR BLD: 79.7 %
NITRITE UR QL STRIP.AUTO: NEGATIVE
NT-PROBNP SERPL-MCNC: 201 PG/ML (ref 0–124)
PERFORMED ON: ABNORMAL
PH UR STRIP.AUTO: 6.5 [PH] (ref 5–8)
PLATELET # BLD AUTO: 220 K/UL (ref 135–450)
PMV BLD AUTO: 9.2 FL (ref 5–10.5)
POTASSIUM SERPL-SCNC: 3.3 MMOL/L (ref 3.5–5.1)
PROCALCITONIN SERPL IA-MCNC: 0.3 NG/ML (ref 0–0.15)
PROT SERPL-MCNC: 7.8 G/DL (ref 6.4–8.2)
PROT UR STRIP.AUTO-MCNC: NEGATIVE MG/DL
RBC # BLD AUTO: 6.46 M/UL (ref 4.2–5.9)
SARS-COV-2 RDRP RESP QL NAA+PROBE: NOT DETECTED
SODIUM SERPL-SCNC: 133 MMOL/L (ref 136–145)
SP GR UR STRIP.AUTO: <=1.005 (ref 1–1.03)
TROPONIN, HIGH SENSITIVITY: 15 NG/L (ref 0–22)
UA COMPLETE W REFLEX CULTURE PNL UR: ABNORMAL
UA DIPSTICK W REFLEX MICRO PNL UR: ABNORMAL
URN SPEC COLLECT METH UR: ABNORMAL
UROBILINOGEN UR STRIP-ACNC: 0.2 E.U./DL
WBC # BLD AUTO: 12.2 K/UL (ref 4–11)

## 2023-07-17 PROCEDURE — 6360000002 HC RX W HCPCS: Performed by: INTERNAL MEDICINE

## 2023-07-17 PROCEDURE — 6370000000 HC RX 637 (ALT 250 FOR IP): Performed by: INTERNAL MEDICINE

## 2023-07-17 PROCEDURE — 74176 CT ABD & PELVIS W/O CONTRAST: CPT

## 2023-07-17 PROCEDURE — 83605 ASSAY OF LACTIC ACID: CPT

## 2023-07-17 PROCEDURE — 87040 BLOOD CULTURE FOR BACTERIA: CPT

## 2023-07-17 PROCEDURE — 6370000000 HC RX 637 (ALT 250 FOR IP): Performed by: STUDENT IN AN ORGANIZED HEALTH CARE EDUCATION/TRAINING PROGRAM

## 2023-07-17 PROCEDURE — 94640 AIRWAY INHALATION TREATMENT: CPT

## 2023-07-17 PROCEDURE — 6360000002 HC RX W HCPCS: Performed by: EMERGENCY MEDICINE

## 2023-07-17 PROCEDURE — 87635 SARS-COV-2 COVID-19 AMP PRB: CPT

## 2023-07-17 PROCEDURE — 2580000003 HC RX 258: Performed by: INTERNAL MEDICINE

## 2023-07-17 PROCEDURE — 80053 COMPREHEN METABOLIC PANEL: CPT

## 2023-07-17 PROCEDURE — 81003 URINALYSIS AUTO W/O SCOPE: CPT

## 2023-07-17 PROCEDURE — 2580000003 HC RX 258: Performed by: EMERGENCY MEDICINE

## 2023-07-17 PROCEDURE — 6370000000 HC RX 637 (ALT 250 FOR IP): Performed by: EMERGENCY MEDICINE

## 2023-07-17 PROCEDURE — 1200000000 HC SEMI PRIVATE

## 2023-07-17 PROCEDURE — 2700000000 HC OXYGEN THERAPY PER DAY

## 2023-07-17 PROCEDURE — 83880 ASSAY OF NATRIURETIC PEPTIDE: CPT

## 2023-07-17 PROCEDURE — 85025 COMPLETE CBC W/AUTO DIFF WBC: CPT

## 2023-07-17 PROCEDURE — 83735 ASSAY OF MAGNESIUM: CPT

## 2023-07-17 PROCEDURE — 96374 THER/PROPH/DIAG INJ IV PUSH: CPT

## 2023-07-17 PROCEDURE — 6360000004 HC RX CONTRAST MEDICATION: Performed by: EMERGENCY MEDICINE

## 2023-07-17 PROCEDURE — 70498 CT ANGIOGRAPHY NECK: CPT

## 2023-07-17 PROCEDURE — 84484 ASSAY OF TROPONIN QUANT: CPT

## 2023-07-17 PROCEDURE — 70450 CT HEAD/BRAIN W/O DYE: CPT

## 2023-07-17 PROCEDURE — 94761 N-INVAS EAR/PLS OXIMETRY MLT: CPT

## 2023-07-17 PROCEDURE — 36415 COLL VENOUS BLD VENIPUNCTURE: CPT

## 2023-07-17 PROCEDURE — 84145 PROCALCITONIN (PCT): CPT

## 2023-07-17 PROCEDURE — 99285 EMERGENCY DEPT VISIT HI MDM: CPT

## 2023-07-17 PROCEDURE — 96375 TX/PRO/DX INJ NEW DRUG ADDON: CPT

## 2023-07-17 PROCEDURE — 93005 ELECTROCARDIOGRAM TRACING: CPT | Performed by: EMERGENCY MEDICINE

## 2023-07-17 PROCEDURE — 71045 X-RAY EXAM CHEST 1 VIEW: CPT

## 2023-07-17 RX ORDER — ASPIRIN 81 MG/1
81 TABLET ORAL DAILY
Status: DISCONTINUED | OUTPATIENT
Start: 2023-07-18 | End: 2023-07-20 | Stop reason: HOSPADM

## 2023-07-17 RX ORDER — 0.9 % SODIUM CHLORIDE 0.9 %
500 INTRAVENOUS SOLUTION INTRAVENOUS ONCE
Status: COMPLETED | OUTPATIENT
Start: 2023-07-17 | End: 2023-07-17

## 2023-07-17 RX ORDER — DEXTROSE MONOHYDRATE 100 MG/ML
INJECTION, SOLUTION INTRAVENOUS CONTINUOUS PRN
Status: DISCONTINUED | OUTPATIENT
Start: 2023-07-17 | End: 2023-07-20 | Stop reason: HOSPADM

## 2023-07-17 RX ORDER — BUTALBITAL, ACETAMINOPHEN AND CAFFEINE 50; 325; 40 MG/1; MG/1; MG/1
1 TABLET ORAL ONCE
Status: COMPLETED | OUTPATIENT
Start: 2023-07-17 | End: 2023-07-17

## 2023-07-17 RX ORDER — INSULIN GLARGINE 100 [IU]/ML
10 INJECTION, SOLUTION SUBCUTANEOUS NIGHTLY
Status: DISCONTINUED | OUTPATIENT
Start: 2023-07-17 | End: 2023-07-20 | Stop reason: HOSPADM

## 2023-07-17 RX ORDER — BUDESONIDE AND FORMOTEROL FUMARATE DIHYDRATE 160; 4.5 UG/1; UG/1
2 AEROSOL RESPIRATORY (INHALATION) 2 TIMES DAILY
Status: DISCONTINUED | OUTPATIENT
Start: 2023-07-17 | End: 2023-07-20 | Stop reason: HOSPADM

## 2023-07-17 RX ORDER — ACETAMINOPHEN 650 MG/1
650 SUPPOSITORY RECTAL EVERY 6 HOURS PRN
Status: DISCONTINUED | OUTPATIENT
Start: 2023-07-17 | End: 2023-07-20 | Stop reason: HOSPADM

## 2023-07-17 RX ORDER — ALBUTEROL SULFATE 90 UG/1
2 AEROSOL, METERED RESPIRATORY (INHALATION) EVERY 4 HOURS PRN
Status: DISCONTINUED | OUTPATIENT
Start: 2023-07-17 | End: 2023-07-18

## 2023-07-17 RX ORDER — KETOROLAC TROMETHAMINE 30 MG/ML
15 INJECTION, SOLUTION INTRAMUSCULAR; INTRAVENOUS ONCE
Status: COMPLETED | OUTPATIENT
Start: 2023-07-17 | End: 2023-07-17

## 2023-07-17 RX ORDER — SODIUM CHLORIDE 0.9 % (FLUSH) 0.9 %
5-40 SYRINGE (ML) INJECTION EVERY 12 HOURS SCHEDULED
Status: DISCONTINUED | OUTPATIENT
Start: 2023-07-17 | End: 2023-07-20 | Stop reason: HOSPADM

## 2023-07-17 RX ORDER — ROPINIROLE 1 MG/1
2 TABLET, FILM COATED ORAL NIGHTLY
Status: DISCONTINUED | OUTPATIENT
Start: 2023-07-17 | End: 2023-07-20 | Stop reason: HOSPADM

## 2023-07-17 RX ORDER — ISOSORBIDE MONONITRATE 30 MG/1
30 TABLET, EXTENDED RELEASE ORAL 2 TIMES DAILY
Status: DISCONTINUED | OUTPATIENT
Start: 2023-07-17 | End: 2023-07-20 | Stop reason: HOSPADM

## 2023-07-17 RX ORDER — SODIUM CHLORIDE 9 MG/ML
INJECTION, SOLUTION INTRAVENOUS CONTINUOUS
Status: DISCONTINUED | OUTPATIENT
Start: 2023-07-17 | End: 2023-07-17

## 2023-07-17 RX ORDER — SODIUM CHLORIDE 0.9 % (FLUSH) 0.9 %
5-40 SYRINGE (ML) INJECTION PRN
Status: DISCONTINUED | OUTPATIENT
Start: 2023-07-17 | End: 2023-07-20 | Stop reason: HOSPADM

## 2023-07-17 RX ORDER — ONDANSETRON 4 MG/1
4 TABLET, ORALLY DISINTEGRATING ORAL EVERY 8 HOURS PRN
Status: DISCONTINUED | OUTPATIENT
Start: 2023-07-17 | End: 2023-07-20 | Stop reason: HOSPADM

## 2023-07-17 RX ORDER — ALBUTEROL SULFATE 2.5 MG/3ML
2.5 SOLUTION RESPIRATORY (INHALATION) EVERY 6 HOURS PRN
Status: DISCONTINUED | OUTPATIENT
Start: 2023-07-17 | End: 2023-07-17

## 2023-07-17 RX ORDER — ALBUTEROL SULFATE 90 UG/1
2 AEROSOL, METERED RESPIRATORY (INHALATION) EVERY 4 HOURS PRN
Status: DISCONTINUED | OUTPATIENT
Start: 2023-07-17 | End: 2023-07-20 | Stop reason: HOSPADM

## 2023-07-17 RX ORDER — BENZONATATE 100 MG/1
100 CAPSULE ORAL EVERY 4 HOURS PRN
Status: DISCONTINUED | OUTPATIENT
Start: 2023-07-17 | End: 2023-07-20 | Stop reason: HOSPADM

## 2023-07-17 RX ORDER — DESVENLAFAXINE SUCCINATE 50 MG/1
50 TABLET, EXTENDED RELEASE ORAL DAILY
Status: DISCONTINUED | OUTPATIENT
Start: 2023-07-18 | End: 2023-07-20 | Stop reason: HOSPADM

## 2023-07-17 RX ORDER — DIPHENHYDRAMINE HYDROCHLORIDE 50 MG/ML
12.5 INJECTION INTRAMUSCULAR; INTRAVENOUS ONCE
Status: COMPLETED | OUTPATIENT
Start: 2023-07-17 | End: 2023-07-17

## 2023-07-17 RX ORDER — METOPROLOL SUCCINATE 25 MG/1
25 TABLET, EXTENDED RELEASE ORAL DAILY
Status: DISCONTINUED | OUTPATIENT
Start: 2023-07-18 | End: 2023-07-20 | Stop reason: HOSPADM

## 2023-07-17 RX ORDER — AMITRIPTYLINE HYDROCHLORIDE 25 MG/1
25 TABLET, FILM COATED ORAL NIGHTLY
Status: DISCONTINUED | OUTPATIENT
Start: 2023-07-17 | End: 2023-07-20 | Stop reason: HOSPADM

## 2023-07-17 RX ORDER — POLYETHYLENE GLYCOL 3350 17 G/17G
17 POWDER, FOR SOLUTION ORAL DAILY PRN
Status: DISCONTINUED | OUTPATIENT
Start: 2023-07-17 | End: 2023-07-20 | Stop reason: HOSPADM

## 2023-07-17 RX ORDER — INSULIN LISPRO 100 [IU]/ML
0-4 INJECTION, SOLUTION INTRAVENOUS; SUBCUTANEOUS
Status: DISCONTINUED | OUTPATIENT
Start: 2023-07-18 | End: 2023-07-20 | Stop reason: HOSPADM

## 2023-07-17 RX ORDER — SODIUM CHLORIDE 9 MG/ML
INJECTION, SOLUTION INTRAVENOUS PRN
Status: DISCONTINUED | OUTPATIENT
Start: 2023-07-17 | End: 2023-07-20 | Stop reason: HOSPADM

## 2023-07-17 RX ORDER — HYDROCODONE BITARTRATE AND ACETAMINOPHEN 10; 325 MG/1; MG/1
1 TABLET ORAL EVERY 6 HOURS PRN
Status: DISCONTINUED | OUTPATIENT
Start: 2023-07-17 | End: 2023-07-20 | Stop reason: HOSPADM

## 2023-07-17 RX ORDER — ACETAMINOPHEN 325 MG/1
650 TABLET ORAL EVERY 6 HOURS PRN
Status: DISCONTINUED | OUTPATIENT
Start: 2023-07-17 | End: 2023-07-20 | Stop reason: HOSPADM

## 2023-07-17 RX ORDER — ENOXAPARIN SODIUM 100 MG/ML
30 INJECTION SUBCUTANEOUS 2 TIMES DAILY
Status: DISCONTINUED | OUTPATIENT
Start: 2023-07-17 | End: 2023-07-20 | Stop reason: HOSPADM

## 2023-07-17 RX ORDER — POTASSIUM CHLORIDE 20 MEQ/1
40 TABLET, EXTENDED RELEASE ORAL ONCE
Status: COMPLETED | OUTPATIENT
Start: 2023-07-17 | End: 2023-07-17

## 2023-07-17 RX ORDER — SODIUM CHLORIDE 9 MG/ML
INJECTION, SOLUTION INTRAVENOUS CONTINUOUS
Status: ACTIVE | OUTPATIENT
Start: 2023-07-17 | End: 2023-07-18

## 2023-07-17 RX ORDER — INSULIN LISPRO 100 [IU]/ML
0-4 INJECTION, SOLUTION INTRAVENOUS; SUBCUTANEOUS NIGHTLY
Status: DISCONTINUED | OUTPATIENT
Start: 2023-07-17 | End: 2023-07-20 | Stop reason: HOSPADM

## 2023-07-17 RX ORDER — CLOPIDOGREL BISULFATE 75 MG/1
75 TABLET ORAL DAILY
Status: DISCONTINUED | OUTPATIENT
Start: 2023-07-18 | End: 2023-07-20 | Stop reason: HOSPADM

## 2023-07-17 RX ORDER — ONDANSETRON 2 MG/ML
4 INJECTION INTRAMUSCULAR; INTRAVENOUS EVERY 6 HOURS PRN
Status: DISCONTINUED | OUTPATIENT
Start: 2023-07-17 | End: 2023-07-20 | Stop reason: HOSPADM

## 2023-07-17 RX ORDER — METOCLOPRAMIDE HYDROCHLORIDE 5 MG/ML
10 INJECTION INTRAMUSCULAR; INTRAVENOUS ONCE
Status: COMPLETED | OUTPATIENT
Start: 2023-07-17 | End: 2023-07-17

## 2023-07-17 RX ADMIN — SODIUM CHLORIDE: 9 INJECTION, SOLUTION INTRAVENOUS at 21:44

## 2023-07-17 RX ADMIN — KETOROLAC TROMETHAMINE 15 MG: 30 INJECTION, SOLUTION INTRAMUSCULAR; INTRAVENOUS at 16:22

## 2023-07-17 RX ADMIN — AMITRIPTYLINE HYDROCHLORIDE 25 MG: 25 TABLET, FILM COATED ORAL at 23:23

## 2023-07-17 RX ADMIN — SODIUM CHLORIDE 500 ML: 9 INJECTION, SOLUTION INTRAVENOUS at 15:29

## 2023-07-17 RX ADMIN — Medication 10 ML: at 21:41

## 2023-07-17 RX ADMIN — PREGABALIN 150 MG: 100 CAPSULE ORAL at 23:23

## 2023-07-17 RX ADMIN — SODIUM CHLORIDE 500 ML: 9 INJECTION, SOLUTION INTRAVENOUS at 15:28

## 2023-07-17 RX ADMIN — IOMEPROL INJECTION 75 ML: 714 INJECTION, SOLUTION INTRAVASCULAR at 15:18

## 2023-07-17 RX ADMIN — AZITHROMYCIN MONOHYDRATE 500 MG: 500 INJECTION, POWDER, LYOPHILIZED, FOR SOLUTION INTRAVENOUS at 19:44

## 2023-07-17 RX ADMIN — CEFTRIAXONE SODIUM 1000 MG: 1 INJECTION, POWDER, FOR SOLUTION INTRAMUSCULAR; INTRAVENOUS at 18:56

## 2023-07-17 RX ADMIN — ENOXAPARIN SODIUM 30 MG: 100 INJECTION SUBCUTANEOUS at 21:41

## 2023-07-17 RX ADMIN — METOCLOPRAMIDE 10 MG: 5 INJECTION, SOLUTION INTRAMUSCULAR; INTRAVENOUS at 16:22

## 2023-07-17 RX ADMIN — INSULIN GLARGINE 10 UNITS: 100 INJECTION, SOLUTION SUBCUTANEOUS at 21:41

## 2023-07-17 RX ADMIN — POTASSIUM CHLORIDE 40 MEQ: 1500 TABLET, EXTENDED RELEASE ORAL at 23:23

## 2023-07-17 RX ADMIN — DIPHENHYDRAMINE HYDROCHLORIDE 12.5 MG: 50 INJECTION, SOLUTION INTRAMUSCULAR; INTRAVENOUS at 16:23

## 2023-07-17 RX ADMIN — ISOSORBIDE MONONITRATE 30 MG: 30 TABLET, EXTENDED RELEASE ORAL at 23:23

## 2023-07-17 RX ADMIN — HYDROCODONE BITARTRATE AND ACETAMINOPHEN 1 TABLET: 10; 325 TABLET ORAL at 23:24

## 2023-07-17 RX ADMIN — ROPINIROLE HYDROCHLORIDE 2 MG: 1 TABLET, FILM COATED ORAL at 23:23

## 2023-07-17 RX ADMIN — Medication 2 PUFF: at 22:33

## 2023-07-17 RX ADMIN — BUTALBITAL, ACETAMINOPHEN, AND CAFFEINE 1 TABLET: 50; 325; 40 TABLET ORAL at 18:56

## 2023-07-17 ASSESSMENT — ENCOUNTER SYMPTOMS
CHEST TIGHTNESS: 0
SINUS PAIN: 1
PHOTOPHOBIA: 0
SHORTNESS OF BREATH: 1
COLOR CHANGE: 0
ABDOMINAL PAIN: 1
VOMITING: 1
BACK PAIN: 1
NAUSEA: 1

## 2023-07-17 ASSESSMENT — PAIN DESCRIPTION - ORIENTATION: ORIENTATION: MID

## 2023-07-17 ASSESSMENT — PAIN - FUNCTIONAL ASSESSMENT
PAIN_FUNCTIONAL_ASSESSMENT: ACTIVITIES ARE NOT PREVENTED
PAIN_FUNCTIONAL_ASSESSMENT: 0-10

## 2023-07-17 ASSESSMENT — PAIN SCALES - GENERAL
PAINLEVEL_OUTOF10: 5
PAINLEVEL_OUTOF10: 7
PAINLEVEL_OUTOF10: 0

## 2023-07-17 ASSESSMENT — PAIN DESCRIPTION - LOCATION: LOCATION: BACK

## 2023-07-17 ASSESSMENT — PAIN SCALES - WONG BAKER: WONGBAKER_NUMERICALRESPONSE: 0

## 2023-07-17 ASSESSMENT — LIFESTYLE VARIABLES
HOW OFTEN DO YOU HAVE A DRINK CONTAINING ALCOHOL: NEVER
HOW MANY STANDARD DRINKS CONTAINING ALCOHOL DO YOU HAVE ON A TYPICAL DAY: PATIENT DOES NOT DRINK

## 2023-07-17 ASSESSMENT — PAIN DESCRIPTION - DESCRIPTORS: DESCRIPTORS: ACHING

## 2023-07-17 NOTE — PLAN OF CARE
Hyperglycemia, just diagnosed with diabetes type II  Difficulty with getting recently prescribed medications, presents wither several days of weakness, headache, dizziness, and some leg numbness. CT head, CTA head/neck reassuring. Has some tachycardia, appears dehydrated. UA neg for infection.      IVFs @125  Lantus 10 u + sliding scale    Med/rec    Annalee Moore DO

## 2023-07-18 ENCOUNTER — TELEPHONE (OUTPATIENT)
Dept: ADMINISTRATIVE | Age: 63
End: 2023-07-18

## 2023-07-18 PROBLEM — E11.649 UNCONTROLLED TYPE 2 DIABETES MELLITUS WITH HYPOGLYCEMIA WITHOUT COMA (HCC): Status: ACTIVE | Noted: 2023-07-18

## 2023-07-18 PROBLEM — R20.0 LEFT LEG NUMBNESS: Status: ACTIVE | Noted: 2023-07-18

## 2023-07-18 PROBLEM — R51.9 ACUTE NONINTRACTABLE HEADACHE: Status: ACTIVE | Noted: 2023-07-18

## 2023-07-18 PROBLEM — R09.02 HYPOXIA: Status: ACTIVE | Noted: 2023-07-18

## 2023-07-18 PROBLEM — E86.0 DEHYDRATION: Status: ACTIVE | Noted: 2023-07-18

## 2023-07-18 LAB
ANION GAP SERPL CALCULATED.3IONS-SCNC: 14 MMOL/L (ref 3–16)
BASOPHILS # BLD: 0 K/UL (ref 0–0.2)
BASOPHILS NFR BLD: 0.5 %
BUN SERPL-MCNC: 17 MG/DL (ref 7–20)
CALCIUM SERPL-MCNC: 9.8 MG/DL (ref 8.3–10.6)
CHLORIDE SERPL-SCNC: 94 MMOL/L (ref 99–110)
CO2 SERPL-SCNC: 33 MMOL/L (ref 21–32)
CREAT SERPL-MCNC: 1 MG/DL (ref 0.8–1.3)
DEPRECATED RDW RBC AUTO: 15.7 % (ref 12.4–15.4)
EKG ATRIAL RATE: 105 BPM
EKG DIAGNOSIS: NORMAL
EKG P AXIS: 46 DEGREES
EKG P-R INTERVAL: 180 MS
EKG Q-T INTERVAL: 344 MS
EKG QRS DURATION: 88 MS
EKG QTC CALCULATION (BAZETT): 454 MS
EKG R AXIS: 251 DEGREES
EKG T AXIS: 57 DEGREES
EKG VENTRICULAR RATE: 105 BPM
EOSINOPHIL # BLD: 0.2 K/UL (ref 0–0.6)
EOSINOPHIL NFR BLD: 2.6 %
GFR SERPLBLD CREATININE-BSD FMLA CKD-EPI: >60 ML/MIN/{1.73_M2}
GLUCOSE BLD-MCNC: 255 MG/DL (ref 70–99)
GLUCOSE BLD-MCNC: 260 MG/DL (ref 70–99)
GLUCOSE BLD-MCNC: 289 MG/DL (ref 70–99)
GLUCOSE BLD-MCNC: 290 MG/DL (ref 70–99)
GLUCOSE BLD-MCNC: 302 MG/DL (ref 70–99)
GLUCOSE SERPL-MCNC: 288 MG/DL (ref 70–99)
HCT VFR BLD AUTO: 52.1 % (ref 40.5–52.5)
HGB BLD-MCNC: 16.9 G/DL (ref 13.5–17.5)
LACTATE BLDV-SCNC: 1.6 MMOL/L (ref 0.4–2)
LACTATE BLDV-SCNC: 2 MMOL/L (ref 0.4–2)
LACTATE BLDV-SCNC: 2.3 MMOL/L (ref 0.4–2)
LACTATE BLDV-SCNC: 2.9 MMOL/L (ref 0.4–2)
LYMPHOCYTES # BLD: 1.8 K/UL (ref 1–5.1)
LYMPHOCYTES NFR BLD: 20.7 %
MCH RBC QN AUTO: 28.4 PG (ref 26–34)
MCHC RBC AUTO-ENTMCNC: 32.4 G/DL (ref 31–36)
MCV RBC AUTO: 87.9 FL (ref 80–100)
MONOCYTES # BLD: 0.6 K/UL (ref 0–1.3)
MONOCYTES NFR BLD: 6.6 %
NEUTROPHILS # BLD: 6.2 K/UL (ref 1.7–7.7)
NEUTROPHILS NFR BLD: 69.6 %
PERFORMED ON: ABNORMAL
PLATELET # BLD AUTO: 188 K/UL (ref 135–450)
PMV BLD AUTO: 9 FL (ref 5–10.5)
POTASSIUM SERPL-SCNC: 3.8 MMOL/L (ref 3.5–5.1)
RBC # BLD AUTO: 5.93 M/UL (ref 4.2–5.9)
SODIUM SERPL-SCNC: 141 MMOL/L (ref 136–145)
WBC # BLD AUTO: 8.9 K/UL (ref 4–11)

## 2023-07-18 PROCEDURE — 93010 ELECTROCARDIOGRAM REPORT: CPT | Performed by: INTERNAL MEDICINE

## 2023-07-18 PROCEDURE — 85025 COMPLETE CBC W/AUTO DIFF WBC: CPT

## 2023-07-18 PROCEDURE — 94640 AIRWAY INHALATION TREATMENT: CPT

## 2023-07-18 PROCEDURE — 97530 THERAPEUTIC ACTIVITIES: CPT

## 2023-07-18 PROCEDURE — 99233 SBSQ HOSP IP/OBS HIGH 50: CPT

## 2023-07-18 PROCEDURE — 94761 N-INVAS EAR/PLS OXIMETRY MLT: CPT

## 2023-07-18 PROCEDURE — 97116 GAIT TRAINING THERAPY: CPT

## 2023-07-18 PROCEDURE — 6370000000 HC RX 637 (ALT 250 FOR IP): Performed by: INTERNAL MEDICINE

## 2023-07-18 PROCEDURE — 6370000000 HC RX 637 (ALT 250 FOR IP)

## 2023-07-18 PROCEDURE — 2580000003 HC RX 258: Performed by: INTERNAL MEDICINE

## 2023-07-18 PROCEDURE — 6370000000 HC RX 637 (ALT 250 FOR IP): Performed by: STUDENT IN AN ORGANIZED HEALTH CARE EDUCATION/TRAINING PROGRAM

## 2023-07-18 PROCEDURE — 1200000000 HC SEMI PRIVATE

## 2023-07-18 PROCEDURE — 6360000002 HC RX W HCPCS

## 2023-07-18 PROCEDURE — 2700000000 HC OXYGEN THERAPY PER DAY

## 2023-07-18 PROCEDURE — 83605 ASSAY OF LACTIC ACID: CPT

## 2023-07-18 PROCEDURE — 97162 PT EVAL MOD COMPLEX 30 MIN: CPT

## 2023-07-18 PROCEDURE — 36415 COLL VENOUS BLD VENIPUNCTURE: CPT

## 2023-07-18 PROCEDURE — 97166 OT EVAL MOD COMPLEX 45 MIN: CPT

## 2023-07-18 PROCEDURE — 80048 BASIC METABOLIC PNL TOTAL CA: CPT

## 2023-07-18 PROCEDURE — 6360000002 HC RX W HCPCS: Performed by: INTERNAL MEDICINE

## 2023-07-18 RX ORDER — LIDOCAINE 4 G/G
1 PATCH TOPICAL DAILY
Status: DISCONTINUED | OUTPATIENT
Start: 2023-07-18 | End: 2023-07-20 | Stop reason: HOSPADM

## 2023-07-18 RX ORDER — IPRATROPIUM BROMIDE AND ALBUTEROL SULFATE 2.5; .5 MG/3ML; MG/3ML
1 SOLUTION RESPIRATORY (INHALATION)
Status: DISCONTINUED | OUTPATIENT
Start: 2023-07-18 | End: 2023-07-18

## 2023-07-18 RX ORDER — BUTALBITAL, ACETAMINOPHEN AND CAFFEINE 50; 325; 40 MG/1; MG/1; MG/1
1 TABLET ORAL EVERY 4 HOURS PRN
Status: DISCONTINUED | OUTPATIENT
Start: 2023-07-18 | End: 2023-07-20 | Stop reason: HOSPADM

## 2023-07-18 RX ORDER — KETOROLAC TROMETHAMINE 30 MG/ML
30 INJECTION, SOLUTION INTRAMUSCULAR; INTRAVENOUS ONCE
Status: COMPLETED | OUTPATIENT
Start: 2023-07-18 | End: 2023-07-18

## 2023-07-18 RX ORDER — IPRATROPIUM BROMIDE AND ALBUTEROL SULFATE 2.5; .5 MG/3ML; MG/3ML
1 SOLUTION RESPIRATORY (INHALATION) EVERY 4 HOURS PRN
Status: DISCONTINUED | OUTPATIENT
Start: 2023-07-18 | End: 2023-07-20 | Stop reason: HOSPADM

## 2023-07-18 RX ORDER — ALBUTEROL SULFATE 90 UG/1
2 AEROSOL, METERED RESPIRATORY (INHALATION) 2 TIMES DAILY
Status: DISCONTINUED | OUTPATIENT
Start: 2023-07-19 | End: 2023-07-20 | Stop reason: HOSPADM

## 2023-07-18 RX ORDER — LEVOFLOXACIN 5 MG/ML
750 INJECTION, SOLUTION INTRAVENOUS EVERY 24 HOURS
Status: DISCONTINUED | OUTPATIENT
Start: 2023-07-18 | End: 2023-07-20

## 2023-07-18 RX ADMIN — SACUBITRIL AND VALSARTAN 1 TABLET: 24; 26 TABLET, FILM COATED ORAL at 14:17

## 2023-07-18 RX ADMIN — Medication 2 PUFF: at 19:33

## 2023-07-18 RX ADMIN — AMITRIPTYLINE HYDROCHLORIDE 25 MG: 25 TABLET, FILM COATED ORAL at 20:40

## 2023-07-18 RX ADMIN — INSULIN LISPRO 2 UNITS: 100 INJECTION, SOLUTION INTRAVENOUS; SUBCUTANEOUS at 11:45

## 2023-07-18 RX ADMIN — INSULIN LISPRO 2 UNITS: 100 INJECTION, SOLUTION INTRAVENOUS; SUBCUTANEOUS at 08:09

## 2023-07-18 RX ADMIN — ENOXAPARIN SODIUM 30 MG: 100 INJECTION SUBCUTANEOUS at 20:39

## 2023-07-18 RX ADMIN — HYDROCODONE BITARTRATE AND ACETAMINOPHEN 1 TABLET: 10; 325 TABLET ORAL at 05:15

## 2023-07-18 RX ADMIN — ENOXAPARIN SODIUM 30 MG: 100 INJECTION SUBCUTANEOUS at 08:47

## 2023-07-18 RX ADMIN — INSULIN GLARGINE 10 UNITS: 100 INJECTION, SOLUTION SUBCUTANEOUS at 20:39

## 2023-07-18 RX ADMIN — ISOSORBIDE MONONITRATE 30 MG: 30 TABLET, EXTENDED RELEASE ORAL at 20:40

## 2023-07-18 RX ADMIN — CLOPIDOGREL BISULFATE 75 MG: 75 TABLET ORAL at 08:47

## 2023-07-18 RX ADMIN — Medication 2 PUFF: at 07:47

## 2023-07-18 RX ADMIN — HYDROCODONE BITARTRATE AND ACETAMINOPHEN 1 TABLET: 10; 325 TABLET ORAL at 20:39

## 2023-07-18 RX ADMIN — SACUBITRIL AND VALSARTAN 1 TABLET: 24; 26 TABLET, FILM COATED ORAL at 20:40

## 2023-07-18 RX ADMIN — TIOTROPIUM BROMIDE INHALATION SPRAY 2 PUFF: 3.12 SPRAY, METERED RESPIRATORY (INHALATION) at 07:47

## 2023-07-18 RX ADMIN — METOPROLOL SUCCINATE 25 MG: 25 TABLET, EXTENDED RELEASE ORAL at 08:47

## 2023-07-18 RX ADMIN — ONDANSETRON 4 MG: 2 INJECTION INTRAMUSCULAR; INTRAVENOUS at 05:15

## 2023-07-18 RX ADMIN — INSULIN LISPRO 4 UNITS: 100 INJECTION, SOLUTION INTRAVENOUS; SUBCUTANEOUS at 20:38

## 2023-07-18 RX ADMIN — ROPINIROLE HYDROCHLORIDE 2 MG: 1 TABLET, FILM COATED ORAL at 20:39

## 2023-07-18 RX ADMIN — DESVENLAFAXINE SUCCINATE 50 MG: 50 TABLET, FILM COATED, EXTENDED RELEASE ORAL at 08:47

## 2023-07-18 RX ADMIN — KETOROLAC TROMETHAMINE 30 MG: 30 INJECTION, SOLUTION INTRAMUSCULAR; INTRAVENOUS at 14:09

## 2023-07-18 RX ADMIN — LEVOFLOXACIN 750 MG: 5 INJECTION, SOLUTION INTRAVENOUS at 16:12

## 2023-07-18 RX ADMIN — ASPIRIN 81 MG: 81 TABLET, COATED ORAL at 08:47

## 2023-07-18 RX ADMIN — ISOSORBIDE MONONITRATE 30 MG: 30 TABLET, EXTENDED RELEASE ORAL at 08:47

## 2023-07-18 RX ADMIN — INSULIN LISPRO 2 UNITS: 100 INJECTION, SOLUTION INTRAVENOUS; SUBCUTANEOUS at 16:12

## 2023-07-18 RX ADMIN — Medication 10 ML: at 20:40

## 2023-07-18 RX ADMIN — PREGABALIN 150 MG: 100 CAPSULE ORAL at 20:40

## 2023-07-18 RX ADMIN — PREGABALIN 150 MG: 100 CAPSULE ORAL at 08:47

## 2023-07-18 ASSESSMENT — PAIN DESCRIPTION - LOCATION
LOCATION: BACK

## 2023-07-18 ASSESSMENT — PAIN SCALES - GENERAL
PAINLEVEL_OUTOF10: 5
PAINLEVEL_OUTOF10: 4
PAINLEVEL_OUTOF10: 5
PAINLEVEL_OUTOF10: 5
PAINLEVEL_OUTOF10: 0
PAINLEVEL_OUTOF10: 4

## 2023-07-18 ASSESSMENT — PAIN SCALES - WONG BAKER
WONGBAKER_NUMERICALRESPONSE: 0
WONGBAKER_NUMERICALRESPONSE: 0

## 2023-07-18 ASSESSMENT — PAIN DESCRIPTION - ORIENTATION
ORIENTATION: MID;LOWER
ORIENTATION: LOWER
ORIENTATION: MID;LOWER

## 2023-07-18 ASSESSMENT — PAIN DESCRIPTION - DESCRIPTORS
DESCRIPTORS: ACHING

## 2023-07-18 NOTE — PROGRESS NOTES
Inpatient Physical Therapy Evaluation & Treatment    Unit: 2 [address]  Date:  7/18/2023  Patient Name:    Simran Navas  Admitting diagnosis:  Shortness of breath [R06.02]  Dehydration [E86.0]  Dizziness [R42]  Lactic acid acidosis [E87.20]  Hypoxia [R09.02]  Generalized weakness [R53.1]  Ill feeling [R68.89]  Left leg numbness [R20.0]  Blurred vision, bilateral [H53.8]  Acute nonintractable headache, unspecified headache type [R51.9]  Pneumonia of left upper lobe due to infectious organism [J18.9]  Uncontrolled type 2 diabetes mellitus with hypoglycemia without coma ([address]) [E11.649]  Abdominal pain with vomiting [R10.9, R11.10]  Hyperglycemia [R73.9]  Admit Date:  7/17/2023  Precautions/Restrictions/WB Status/ Lines/ Wounds/ Oxygen: Fall risk, Bed/chair alarm, Lines (Supplemental O2 (4L)), and Telemetry    Treatment Time:  734- 8914  Treatment Number:  1   Timed Code Treatment Minutes: 25 minutes  Total Treatment Minutes:  35  minutes    Patient Stated Goals for Therapy: \" to get back home, I am not going anywhere for rehab \"          Discharge Recommendations: Home with PRN assistance and Home PT  DME needs for discharge: Needs Met       Therapy recommendation for EMS Transport: can transport by wheelchair    Therapy recommendations for staff:   Assist of 1 for ambulation with use of gait belt and bariatric RW  to/from chair  to/from bathroom    History of Present Illness:   Per H&P:   Pt is a 63 y.o. male with PMH of COPD, DM II, HTN , CAD s/p PCI/CABG, CHF, Mobitz Type I/CHB s/p pacemaker who presents with worsening symptoms of weakness and blurry vision. Pt states he was recently diagnosed with diabetes and was prescribed medications but wasn't able to fill them. His glucose was in the 500's this morning. Pt was also complaints of headache and neck pain started few days ago. He has hx of chronic headache but states this is more intense. Pt also complains of LEE states it is chronic but has gotten worse lately.

## 2023-07-18 NOTE — PROGRESS NOTES
Patient admitted to room ___221_ from Dr. Isaias Shane. Patient oriented to room, call light, bed rails, phone, lights and bathroom. Patient instructed about the schedule of the day including: vital sign frequency, lab draws, possible tests, frequency of MD and staff rounds, daily weights, I &O's and prescribed diet. Telemetry box in place, patient aware of placement and reason. Bed locked, in lowest position, side rails up 2/4, call light within reach. Recliner Assessment:     Patient is able to demonstrate the ability to move from a reclining position to an upright position within the recliner. 4 Eyes Skin Assessment     The patient is being assess for   Admission    I agree that 2 RN's have performed a thorough Head to Toe Skin Assessment on the patient. ALL assessment sites listed below have been assessed. Areas assessed for pressure by both nurses:   [x]   Head, Face, and Ears   [x]   Shoulders, Back, and Chest, Abdomen  [x]   Arms, Elbows, and Hands   [x]   Coccyx, Sacrum, and Ischium  [x]   Legs, Feet, and Heels        Skin Assessed Under all Medical Devices by both nurses:  na               All Mepilex Borders were peeled back and area peeked at by both nurses:  No: na  Please list where Mepilex Borders are located:  na             **SHARE this note so that the co-signing nurse is able to place an eSignature**    Co-signer eSignature: Electronically signed by Aniya Ling RN on 7/18/23 at 4:55 AM EDT    Does the Patient have Skin Breakdown related to pressure?   No     Scattered bruises and abrasions         Maxime Prevention initiated:  NA   Wound Care Orders initiated:  NA      [facility] nurse consulted for Pressure Injury (Stage 3,4, Unstageable, DTI, NWPT, Complex wounds)and New or Established Ostomies:  NA      Primary Nurse eSignature: Electronically signed by Lydia Martin RN on 7/17/23 at 11:40 PM EDT

## 2023-07-18 NOTE — ACP (ADVANCE CARE PLANNING)
Advance Care Planning     General Advance Care Planning (ACP) Conversation    Date of Conversation: 7/17/2023  Conducted with: Patient with Decision Making Capacity    Healthcare Decision Maker:    Primary Decision Maker: Yulisa Arzate - Girlfriend - [phone]  Click here to complete Healthcare Decision Makers including selection of the Healthcare Decision Maker Relationship (ie \"Primary\"). Today CM made copies of the POA documents the patient brought to the hospital. CM made a copy and placed the copy in the patient's paper chart. Original returned to the patient's girlfriend. The girlfriend, Holley Torres, is the patient's POA. Content/Action Overview:  A copy of POA is in the paper chart. The girlfriend is the patient's POA.    Reviewed DNR/DNI and patient elects Full Code (Attempt Resuscitation)        Length of Voluntary ACP Conversation in minutes:  <16 minutes (Non-Billable)    Michael Arriaga RN

## 2023-07-18 NOTE — PROGRESS NOTES
Inpatient Occupational Therapy Evaluation and Treatment    Unit: 2 [place]  Date:  7/18/2023  Patient Name:    Chris Templeton  Admitting diagnosis:  Shortness of breath [R06.02]  Dehydration [E86.0]  Dizziness [R42]  Lactic acid acidosis [E87.20]  Hypoxia [R09.02]  Generalized weakness [R53.1]  Ill feeling [R68.89]  Left leg numbness [R20.0]  Blurred vision, bilateral [H53.8]  Acute nonintractable headache, unspecified headache type [R51.9]  Pneumonia of left upper lobe due to infectious organism [J18.9]  Uncontrolled type 2 diabetes mellitus with hypoglycemia without coma ([address]) [E11.649]  Abdominal pain with vomiting [R10.9, R11.10]  Hyperglycemia [R73.9]  Admit Date:  7/17/2023  Precautions/Restrictions/WB Status/ Lines/ Wounds/ Oxygen: Fall risk, Bed/chair alarm, Lines (Supplemental O2 (4L)), and Telemetry    Treatment Time:  6967-9703  Treatment Number:  1  Timed Code Treatment Minutes: 25 minutes  Total Treatment Minutes:  35  minutes    Patient Goals for Therapy: None stated          Discharge Recommendations: Home with PRN assist  DME needs for discharge: Needs Met         Therapy recommendations for staff:   Assist of 1 for transfers with use of rolling walker (RW) and gait belt to/from chair  to/from bathroom  within room    History of Present Illness:   Per H&P  \"Isaac Simeon is a 63 y.o. male with PMH of COPD, DM II, HTN , CAD s/p PCI/CABG, CHF, Mobitz Type I/CHB s/p pacemaker who presents with worsening symptoms of weakness and blurry vision. Pt states he was recently diagnosed with diabetes and was prescribed medications but wasn't able to fill them. His glucose was in the 500's this morning. Pt was also complaints of headache and neck pain started few days ago. He has hx of chronic headache but states this is more intense. Pt also complains of LEE states it is chronic but has gotten worse lately. Denies any orthopnea or chest pain. Pt has ongoing nausea and vomiting no change. Has chronic cough.  Pt

## 2023-07-18 NOTE — H&P
V2.0  History and Physical      Name:  Mica Honeycutt /Age/Sex: 1960  (63 y.o. male)   MRN & CSN:  [id number] & [id number] Encounter Date/Time: 2023 9:59 PM EDT   Location:   PCP: Ximena Cintron, [address] [place] [address]  Day: 1      History of Present Illness:     Chief Complaint:     Mica Honeycutt is a 63 y.o. male with PMH of COPD, DM II, HTN , CAD s/p PCI/CABG, CHF, Mobitz Type I/CHB s/p pacemaker who presents with worsening symptoms of weakness and blurry vision. Pt states he was recently diagnosed with diabetes and was prescribed medications but wasn't able to fill them. His glucose was in the 500's this morning. Pt was also complaints of headache and neck pain started few days ago. He has hx of chronic headache but states this is more intense. Pt also complains of LEE states it is chronic but has gotten worse lately. Denies any orthopnea or chest pain. Pt has ongoing nausea and vomiting no change. Has chronic cough. Pt also has long hx of low back pain, hasn't seen nsx before. Has been on [place] and Lyrica. Now complaining of left sided numbness feels it is new. He also reports bilateral LE weakness states it is choric. Pt also complains of abd pain, last bowel movement was today, no diarrhea. At ED, pt was afebrile, hemodynamically stable 's; on 4L (baseline). Labs remarkable for Na 133, K 3.3, Cl 86, HCO3 32, glucose 416, Cr 1.0, LA 3.4, WBC 12.2, Hgb 17.9.  UA unremarkable. CT abd unremarkable except for fatty liver. CT head/CTA neck no acute changes but showed nodularity within CHARLI likely infectious vs inflammatory. Pt was given 1L IV NS, rocephin/azithromycin, fiorcet, Toradol. Hospitalist on-call discussed pt with ED, pt was admitted to [facility] for hyperglycemia and dehydration management. Assessment and Plan       SIRS 2/4. With LA 3.4. Likely 2/2 dehydration. No sign of infection. CXR, CT abd/pelvis, and UA no sign of infection.  Received IVF bolsues,

## 2023-07-18 NOTE — CARE COORDINATION
07/18/23 1746   Service Assessment   Patient Orientation Alert and Oriented;Person;Place;Situation   Cognition Alert   History Provided By Patient   Primary Caregiver Self   Support Systems Spouse/Significant Other   PCP Verified by CM Yes   Last Visit to PCP Within last 3 months   Prior Functional Level Independent in ADLs/IADLs   Current Functional Level Independent in ADLs/IADLs   Can patient return to prior living arrangement Yes   Ability to make needs known: Good   Family able to assist with home care needs: Yes   Would you like for me to discuss the discharge plan with any other family members/significant others, and if so, who? No   Financial Resources [employer] None   Discharge Planning   Type of Residence House   Living Arrangements Spouse/Significant Other   Current Services Prior To Admission Oxygen Therapy  (Oxygen to sleep. 4L. Supplied by [employer].)   Potential Assistance Needed Durable Medical Equipment   Potential DME Needed Cane; Shower Chair;Oxygen Therapy (Comment)   DME Ordered? No   Potential Assistance Purchasing Medications No   Type of Home Care Services None   Patient expects to be discharged to: House   Follow Up Appointment: Best Day/Time    (self)   One/Two Story Residence One story   History of falls? 0     Case Management Assessment  Initial Evaluation    Date/Time of Evaluation: 7/18/2023 5:49 PM  Assessment Completed by: Kenzie Marmolejo RN    If patient is discharged prior to next notation, then this note serves as note for discharge by case management.     Patient Name: Sarahi Canales                   YOB: 1960  Diagnosis: Shortness of breath [R06.02]  Dehydration [E86.0]  Dizziness [R42]  Lactic acid acidosis [E87.20]  Hypoxia [R09.02]  Generalized weakness [R53.1]  Ill feeling [R68.89]  Left leg numbness [R20.0]  Blurred vision, bilateral [H53.8]  Acute nonintractable headache, unspecified headache type [R51.9]  Pneumonia of left upper lobe due

## 2023-07-18 NOTE — PLAN OF CARE
Problem: Discharge Planning  Goal: Discharge to home or other facility with appropriate resources  7/18/2023 1020 by Annemarie Pabon RN  Outcome: Progressing     Problem: Pain  Goal: Verbalizes/displays adequate comfort level or baseline comfort level  7/18/2023 1020 by Annemarie Pabon RN  Outcome: Progressing     Problem: Safety - Adult  Goal: Free from fall injury  7/18/2023 1020 by Annemarie Pabon RN  Outcome: Progressing     Problem: Chronic Conditions and Co-morbidities  Goal: Patient's chronic conditions and co-morbidity symptoms are monitored and maintained or improved  Outcome: Progressing     Problem: Respiratory - Adult  Goal: Achieves optimal ventilation and oxygenation  Outcome: Progressing     Problem: Cardiovascular - Adult  Goal: Maintains optimal cardiac output and hemodynamic stability  Outcome: Progressing     Problem: Cardiovascular - Adult  Goal: Absence of cardiac dysrhythmias or at baseline  Outcome: Progressing

## 2023-07-18 NOTE — PROGRESS NOTES
Admission assessment complete. Alert and oriented. Glucose monitor noted on RUE. Snack and drinks provided to patient. Patient denies any needs at this time. Bed in lowest position. Side rails up x2. Call light in reach.

## 2023-07-18 NOTE — TELEPHONE ENCOUNTER
Submitted PA for Trulicity 5.02VA/6.2MA pen-injectors  Via CM Key: [id number] STATUS: PENDING. Follow up done daily; if no response in three days we will refax for status check. If another three days goes by with no response we will call the insurance for status.

## 2023-07-18 NOTE — PLAN OF CARE
Problem: Discharge Planning  Goal: Discharge to home or other facility with appropriate resources  Outcome: Progressing  Flowsheets  Taken 7/17/2023 2129  Discharge to home or other facility with appropriate resources: Identify barriers to discharge with patient and caregiver  Taken 7/17/2023 2128  Discharge to home or other facility with appropriate resources: Identify barriers to discharge with patient and caregiver     Problem: Pain  Goal: Verbalizes/displays adequate comfort level or baseline comfort level  Outcome: Progressing     Problem: Safety - Adult  Goal: Free from fall injury  Outcome: Progressing

## 2023-07-19 LAB
ALBUMIN SERPL-MCNC: 3 G/DL (ref 3.4–5)
ALP SERPL-CCNC: 73 U/L (ref 40–129)
ALT SERPL-CCNC: 36 U/L (ref 10–40)
ANION GAP SERPL CALCULATED.3IONS-SCNC: 9 MMOL/L (ref 3–16)
AST SERPL-CCNC: 38 U/L (ref 15–37)
BASOPHILS # BLD: 0.1 K/UL (ref 0–0.2)
BASOPHILS NFR BLD: 0.7 %
BILIRUB DIRECT SERPL-MCNC: <0.2 MG/DL (ref 0–0.3)
BILIRUB INDIRECT SERPL-MCNC: ABNORMAL MG/DL (ref 0–1)
BILIRUB SERPL-MCNC: 0.5 MG/DL (ref 0–1)
BUN SERPL-MCNC: 12 MG/DL (ref 7–20)
CALCIUM SERPL-MCNC: 8.8 MG/DL (ref 8.3–10.6)
CHLORIDE SERPL-SCNC: 93 MMOL/L (ref 99–110)
CO2 SERPL-SCNC: 32 MMOL/L (ref 21–32)
CREAT SERPL-MCNC: 0.8 MG/DL (ref 0.8–1.3)
DEPRECATED RDW RBC AUTO: 15.5 % (ref 12.4–15.4)
EOSINOPHIL # BLD: 0.2 K/UL (ref 0–0.6)
EOSINOPHIL NFR BLD: 2.4 %
GFR SERPLBLD CREATININE-BSD FMLA CKD-EPI: >60 ML/MIN/{1.73_M2}
GLUCOSE BLD-MCNC: 204 MG/DL (ref 70–99)
GLUCOSE BLD-MCNC: 221 MG/DL (ref 70–99)
GLUCOSE BLD-MCNC: 237 MG/DL (ref 70–99)
GLUCOSE BLD-MCNC: 275 MG/DL (ref 70–99)
GLUCOSE BLD-MCNC: 303 MG/DL (ref 70–99)
GLUCOSE SERPL-MCNC: 220 MG/DL (ref 70–99)
HCT VFR BLD AUTO: 44.5 % (ref 40.5–52.5)
HGB BLD-MCNC: 14.8 G/DL (ref 13.5–17.5)
LACTATE BLDV-SCNC: 1.3 MMOL/L (ref 0.4–2)
LACTATE BLDV-SCNC: 1.4 MMOL/L (ref 0.4–2)
LYMPHOCYTES # BLD: 1.2 K/UL (ref 1–5.1)
LYMPHOCYTES NFR BLD: 15 %
MAGNESIUM SERPL-MCNC: 2.1 MG/DL (ref 1.8–2.4)
MCH RBC QN AUTO: 28.7 PG (ref 26–34)
MCHC RBC AUTO-ENTMCNC: 33.3 G/DL (ref 31–36)
MCV RBC AUTO: 86.1 FL (ref 80–100)
MONOCYTES # BLD: 0.6 K/UL (ref 0–1.3)
MONOCYTES NFR BLD: 6.8 %
NEUTROPHILS # BLD: 6.1 K/UL (ref 1.7–7.7)
NEUTROPHILS NFR BLD: 75.1 %
PERFORMED ON: ABNORMAL
PLATELET # BLD AUTO: 144 K/UL (ref 135–450)
PMV BLD AUTO: 8.8 FL (ref 5–10.5)
POTASSIUM SERPL-SCNC: 3.4 MMOL/L (ref 3.5–5.1)
PROT SERPL-MCNC: 6 G/DL (ref 6.4–8.2)
RBC # BLD AUTO: 5.17 M/UL (ref 4.2–5.9)
SODIUM SERPL-SCNC: 134 MMOL/L (ref 136–145)
WBC # BLD AUTO: 8.2 K/UL (ref 4–11)

## 2023-07-19 PROCEDURE — 83605 ASSAY OF LACTIC ACID: CPT

## 2023-07-19 PROCEDURE — 99232 SBSQ HOSP IP/OBS MODERATE 35: CPT | Performed by: INTERNAL MEDICINE

## 2023-07-19 PROCEDURE — 2700000000 HC OXYGEN THERAPY PER DAY

## 2023-07-19 PROCEDURE — 6370000000 HC RX 637 (ALT 250 FOR IP)

## 2023-07-19 PROCEDURE — 6360000002 HC RX W HCPCS: Performed by: INTERNAL MEDICINE

## 2023-07-19 PROCEDURE — 94761 N-INVAS EAR/PLS OXIMETRY MLT: CPT

## 2023-07-19 PROCEDURE — 6370000000 HC RX 637 (ALT 250 FOR IP): Performed by: INTERNAL MEDICINE

## 2023-07-19 PROCEDURE — 83735 ASSAY OF MAGNESIUM: CPT

## 2023-07-19 PROCEDURE — 36415 COLL VENOUS BLD VENIPUNCTURE: CPT

## 2023-07-19 PROCEDURE — 80048 BASIC METABOLIC PNL TOTAL CA: CPT

## 2023-07-19 PROCEDURE — 1200000000 HC SEMI PRIVATE

## 2023-07-19 PROCEDURE — 2580000003 HC RX 258: Performed by: INTERNAL MEDICINE

## 2023-07-19 PROCEDURE — 6370000000 HC RX 637 (ALT 250 FOR IP): Performed by: STUDENT IN AN ORGANIZED HEALTH CARE EDUCATION/TRAINING PROGRAM

## 2023-07-19 PROCEDURE — 85025 COMPLETE CBC W/AUTO DIFF WBC: CPT

## 2023-07-19 PROCEDURE — 80076 HEPATIC FUNCTION PANEL: CPT

## 2023-07-19 PROCEDURE — 6360000002 HC RX W HCPCS

## 2023-07-19 PROCEDURE — 94640 AIRWAY INHALATION TREATMENT: CPT

## 2023-07-19 RX ORDER — MAGNESIUM SULFATE IN WATER 40 MG/ML
2000 INJECTION, SOLUTION INTRAVENOUS PRN
Status: DISCONTINUED | OUTPATIENT
Start: 2023-07-19 | End: 2023-07-20 | Stop reason: HOSPADM

## 2023-07-19 RX ORDER — POTASSIUM CHLORIDE 7.45 MG/ML
10 INJECTION INTRAVENOUS PRN
Status: DISCONTINUED | OUTPATIENT
Start: 2023-07-19 | End: 2023-07-20 | Stop reason: HOSPADM

## 2023-07-19 RX ORDER — POTASSIUM CHLORIDE 20 MEQ/1
40 TABLET, EXTENDED RELEASE ORAL PRN
Status: DISCONTINUED | OUTPATIENT
Start: 2023-07-19 | End: 2023-07-20 | Stop reason: HOSPADM

## 2023-07-19 RX ADMIN — ENOXAPARIN SODIUM 30 MG: 100 INJECTION SUBCUTANEOUS at 21:03

## 2023-07-19 RX ADMIN — INSULIN LISPRO 1 UNITS: 100 INJECTION, SOLUTION INTRAVENOUS; SUBCUTANEOUS at 11:57

## 2023-07-19 RX ADMIN — HYDROCODONE BITARTRATE AND ACETAMINOPHEN 1 TABLET: 10; 325 TABLET ORAL at 03:32

## 2023-07-19 RX ADMIN — HYDROCODONE BITARTRATE AND ACETAMINOPHEN 1 TABLET: 10; 325 TABLET ORAL at 21:00

## 2023-07-19 RX ADMIN — ENOXAPARIN SODIUM 30 MG: 100 INJECTION SUBCUTANEOUS at 09:38

## 2023-07-19 RX ADMIN — LEVOFLOXACIN 750 MG: 5 INJECTION, SOLUTION INTRAVENOUS at 16:00

## 2023-07-19 RX ADMIN — ONDANSETRON 4 MG: 2 INJECTION INTRAMUSCULAR; INTRAVENOUS at 03:32

## 2023-07-19 RX ADMIN — SACUBITRIL AND VALSARTAN 1 TABLET: 24; 26 TABLET, FILM COATED ORAL at 21:00

## 2023-07-19 RX ADMIN — Medication 10 ML: at 09:37

## 2023-07-19 RX ADMIN — PREGABALIN 150 MG: 100 CAPSULE ORAL at 21:00

## 2023-07-19 RX ADMIN — ONDANSETRON 4 MG: 2 INJECTION INTRAMUSCULAR; INTRAVENOUS at 09:38

## 2023-07-19 RX ADMIN — ISOSORBIDE MONONITRATE 30 MG: 30 TABLET, EXTENDED RELEASE ORAL at 21:00

## 2023-07-19 RX ADMIN — Medication 2 PUFF: at 19:25

## 2023-07-19 RX ADMIN — Medication 2 PUFF: at 08:00

## 2023-07-19 RX ADMIN — AMITRIPTYLINE HYDROCHLORIDE 25 MG: 25 TABLET, FILM COATED ORAL at 21:00

## 2023-07-19 RX ADMIN — INSULIN LISPRO 3 UNITS: 100 INJECTION, SOLUTION INTRAVENOUS; SUBCUTANEOUS at 09:38

## 2023-07-19 RX ADMIN — ROPINIROLE HYDROCHLORIDE 2 MG: 1 TABLET, FILM COATED ORAL at 20:59

## 2023-07-19 RX ADMIN — INSULIN GLARGINE 10 UNITS: 100 INJECTION, SOLUTION SUBCUTANEOUS at 21:03

## 2023-07-19 ASSESSMENT — PAIN DESCRIPTION - DESCRIPTORS
DESCRIPTORS: ACHING
DESCRIPTORS: ACHING

## 2023-07-19 ASSESSMENT — PAIN - FUNCTIONAL ASSESSMENT: PAIN_FUNCTIONAL_ASSESSMENT: ACTIVITIES ARE NOT PREVENTED

## 2023-07-19 ASSESSMENT — PAIN DESCRIPTION - LOCATION
LOCATION: BACK
LOCATION: BACK

## 2023-07-19 ASSESSMENT — PAIN SCALES - WONG BAKER: WONGBAKER_NUMERICALRESPONSE: 0

## 2023-07-19 ASSESSMENT — PAIN DESCRIPTION - PAIN TYPE: TYPE: CHRONIC PAIN

## 2023-07-19 ASSESSMENT — PAIN SCALES - GENERAL
PAINLEVEL_OUTOF10: 0
PAINLEVEL_OUTOF10: 6
PAINLEVEL_OUTOF10: 5

## 2023-07-19 ASSESSMENT — PAIN DESCRIPTION - ORIENTATION
ORIENTATION: MID;LOWER
ORIENTATION: LOWER

## 2023-07-19 NOTE — PROGRESS NOTES
Patient complains of pain in back rated 5/10. Prn medication administered for pain, see MAR. Patient complains of nausea. Prn medication administered for nausea, see MAR.

## 2023-07-19 NOTE — CARE COORDINATION
[facility]  Received referral regarding HC services from Mark Brice CTN to follow up with [facility] regarding SOC coverage at discharge.       Electronically signed by Travon Hobbs RN on 7/19/2023 at 2:06 PM

## 2023-07-19 NOTE — CARE COORDINATION
INTERDISCIPLINARY PLAN OF CARE CONFERENCE    Date/Time: 7/19/2023 2:09 PM  Completed by: Kimber Panchal, Case Management      Patient Name:  Sarahi Canales  YOB: 1960  Admitting Diagnosis: Shortness of breath [R06.02]  Dehydration [E86.0]  Dizziness [R42]  Lactic acid acidosis [E87.20]  Hypoxia [R09.02]  Generalized weakness [R53.1]  Ill feeling [R68.89]  Left leg numbness [R20.0]  Blurred vision, bilateral [H53.8]  Acute nonintractable headache, unspecified headache type [R51.9]  Pneumonia of left upper lobe due to infectious organism [J18.9]  Uncontrolled type 2 diabetes mellitus with hypoglycemia without coma ([address]) [E11.649]  Abdominal pain with vomiting [R10.9, R11.10]  Hyperglycemia [R73.9]     Admit Date/Time:  7/17/2023  2:05 PM    Chart reviewed. Interdisciplinary team contacted or reviewed plan related to patient progress and discharge plans. Disciplines included Case Management, Nursing, and Dietitian. Current Status: Stable  PT/OT recommendation for discharge plan of care: Discharge Recommendations: Home with PRN assistance and Home PT  DME needs for discharge: Needs Met    Expected D/C Disposition:  Home  Confirmed plan with patient and/or family Yes confirmed with: (name) Anthony Miguel  Met with: Patient    Discharge Plan Comments: Reviewed chart, met with pt at bedside. Pt state to try [facility] for Home PT, referral called to [place]. Pt states that his diabetic medications were originally sent to [facility] and transferred to [facility]. [place]. Pt states if pt wanted now, he would have to pay OOP or wait until 8/5/2023. Spoke with Maya Gonzalez at [address] [place] [address] who states there were active orders at both [facility] and [facility]. [place]. Called [facility] and spoke with Catarino who will back out the orders for Trulicity and metformin and he can get these medications at [facility] in [place]. [place]. Pt made aware, will continue to follow.      Home O2 in place on admit: Yes  Pt informed of need to

## 2023-07-19 NOTE — PROGRESS NOTES
Shift assessment complete. Alert and oriented. Patient complains of pain in back rated 5/10. Prn medication administered for pain, see MAR. Patient denies any other needs at this time. Bed in lowest position. Side rails up x2. Call light in reach.

## 2023-07-19 NOTE — PLAN OF CARE
Problem: Discharge Planning  Goal: Discharge to home or other facility with appropriate resources  7/18/2023 2138 by Joel Ford RN  Outcome: Progressing  7/18/2023 1020 by Annemarie Pabon RN  Outcome: Progressing     Problem: Pain  Goal: Verbalizes/displays adequate comfort level or baseline comfort level  7/18/2023 2138 by Joel Ford RN  Outcome: Progressing  7/18/2023 1020 by Annemarie Pabon RN  Outcome: Progressing     Problem: Safety - Adult  Goal: Free from fall injury  7/18/2023 1020 by Annemarie Pabon, RN  Outcome: Progressing

## 2023-07-20 VITALS
DIASTOLIC BLOOD PRESSURE: 70 MMHG | WEIGHT: 307.1 LBS | HEIGHT: 64 IN | RESPIRATION RATE: 18 BRPM | HEART RATE: 73 BPM | BODY MASS INDEX: 52.43 KG/M2 | SYSTOLIC BLOOD PRESSURE: 103 MMHG | OXYGEN SATURATION: 93 % | TEMPERATURE: 98.7 F

## 2023-07-20 LAB
ANION GAP SERPL CALCULATED.3IONS-SCNC: 8 MMOL/L (ref 3–16)
BASOPHILS # BLD: 0 K/UL (ref 0–0.2)
BASOPHILS NFR BLD: 0.4 %
BUN SERPL-MCNC: 11 MG/DL (ref 7–20)
CALCIUM SERPL-MCNC: 8.9 MG/DL (ref 8.3–10.6)
CHLORIDE SERPL-SCNC: 93 MMOL/L (ref 99–110)
CO2 SERPL-SCNC: 35 MMOL/L (ref 21–32)
CREAT SERPL-MCNC: 0.8 MG/DL (ref 0.8–1.3)
DEPRECATED RDW RBC AUTO: 15.4 % (ref 12.4–15.4)
EOSINOPHIL # BLD: 0.2 K/UL (ref 0–0.6)
EOSINOPHIL NFR BLD: 2.1 %
GFR SERPLBLD CREATININE-BSD FMLA CKD-EPI: >60 ML/MIN/{1.73_M2}
GLUCOSE BLD-MCNC: 204 MG/DL (ref 70–99)
GLUCOSE BLD-MCNC: 231 MG/DL (ref 70–99)
GLUCOSE SERPL-MCNC: 209 MG/DL (ref 70–99)
HCT VFR BLD AUTO: 44 % (ref 40.5–52.5)
HGB BLD-MCNC: 14.5 G/DL (ref 13.5–17.5)
LYMPHOCYTES # BLD: 1.3 K/UL (ref 1–5.1)
LYMPHOCYTES NFR BLD: 17.4 %
MCH RBC QN AUTO: 28.6 PG (ref 26–34)
MCHC RBC AUTO-ENTMCNC: 33 G/DL (ref 31–36)
MCV RBC AUTO: 86.7 FL (ref 80–100)
MONOCYTES # BLD: 0.6 K/UL (ref 0–1.3)
MONOCYTES NFR BLD: 8.1 %
NEUTROPHILS # BLD: 5.2 K/UL (ref 1.7–7.7)
NEUTROPHILS NFR BLD: 72 %
PERFORMED ON: ABNORMAL
PERFORMED ON: ABNORMAL
PLATELET # BLD AUTO: 143 K/UL (ref 135–450)
PMV BLD AUTO: 8.8 FL (ref 5–10.5)
POTASSIUM SERPL-SCNC: 3.7 MMOL/L (ref 3.5–5.1)
RBC # BLD AUTO: 5.07 M/UL (ref 4.2–5.9)
SODIUM SERPL-SCNC: 136 MMOL/L (ref 136–145)
WBC # BLD AUTO: 7.3 K/UL (ref 4–11)

## 2023-07-20 PROCEDURE — 80048 BASIC METABOLIC PNL TOTAL CA: CPT

## 2023-07-20 PROCEDURE — 94640 AIRWAY INHALATION TREATMENT: CPT

## 2023-07-20 PROCEDURE — 85025 COMPLETE CBC W/AUTO DIFF WBC: CPT

## 2023-07-20 PROCEDURE — 6370000000 HC RX 637 (ALT 250 FOR IP)

## 2023-07-20 PROCEDURE — 94761 N-INVAS EAR/PLS OXIMETRY MLT: CPT

## 2023-07-20 PROCEDURE — 2580000003 HC RX 258: Performed by: INTERNAL MEDICINE

## 2023-07-20 PROCEDURE — 2700000000 HC OXYGEN THERAPY PER DAY

## 2023-07-20 PROCEDURE — 36415 COLL VENOUS BLD VENIPUNCTURE: CPT

## 2023-07-20 PROCEDURE — 6370000000 HC RX 637 (ALT 250 FOR IP): Performed by: INTERNAL MEDICINE

## 2023-07-20 PROCEDURE — 6360000002 HC RX W HCPCS: Performed by: INTERNAL MEDICINE

## 2023-07-20 PROCEDURE — 6370000000 HC RX 637 (ALT 250 FOR IP): Performed by: STUDENT IN AN ORGANIZED HEALTH CARE EDUCATION/TRAINING PROGRAM

## 2023-07-20 RX ORDER — LEVOFLOXACIN 750 MG/1
750 TABLET ORAL DAILY
Qty: 3 TABLET | Refills: 0 | Status: SHIPPED | OUTPATIENT
Start: 2023-07-21 | End: 2023-07-24

## 2023-07-20 RX ORDER — LEVOFLOXACIN 750 MG/1
750 TABLET ORAL DAILY
Status: DISCONTINUED | OUTPATIENT
Start: 2023-07-20 | End: 2023-07-20 | Stop reason: HOSPADM

## 2023-07-20 RX ADMIN — METOPROLOL SUCCINATE 25 MG: 25 TABLET, EXTENDED RELEASE ORAL at 09:04

## 2023-07-20 RX ADMIN — ASPIRIN 81 MG: 81 TABLET, COATED ORAL at 09:04

## 2023-07-20 RX ADMIN — CLOPIDOGREL BISULFATE 75 MG: 75 TABLET ORAL at 09:04

## 2023-07-20 RX ADMIN — DESVENLAFAXINE SUCCINATE 50 MG: 50 TABLET, FILM COATED, EXTENDED RELEASE ORAL at 09:04

## 2023-07-20 RX ADMIN — INSULIN LISPRO 1 UNITS: 100 INJECTION, SOLUTION INTRAVENOUS; SUBCUTANEOUS at 09:05

## 2023-07-20 RX ADMIN — HYDROCODONE BITARTRATE AND ACETAMINOPHEN 1 TABLET: 10; 325 TABLET ORAL at 09:50

## 2023-07-20 RX ADMIN — ENOXAPARIN SODIUM 30 MG: 100 INJECTION SUBCUTANEOUS at 09:04

## 2023-07-20 RX ADMIN — Medication 2 PUFF: at 07:17

## 2023-07-20 RX ADMIN — SACUBITRIL AND VALSARTAN 1 TABLET: 24; 26 TABLET, FILM COATED ORAL at 09:04

## 2023-07-20 RX ADMIN — ISOSORBIDE MONONITRATE 30 MG: 30 TABLET, EXTENDED RELEASE ORAL at 09:04

## 2023-07-20 RX ADMIN — PREGABALIN 150 MG: 100 CAPSULE ORAL at 09:04

## 2023-07-20 RX ADMIN — INSULIN LISPRO 1 UNITS: 100 INJECTION, SOLUTION INTRAVENOUS; SUBCUTANEOUS at 11:53

## 2023-07-20 RX ADMIN — LEVOFLOXACIN 750 MG: 750 TABLET, FILM COATED ORAL at 11:52

## 2023-07-20 RX ADMIN — Medication 10 ML: at 09:04

## 2023-07-20 ASSESSMENT — PAIN DESCRIPTION - DESCRIPTORS: DESCRIPTORS: ACHING

## 2023-07-20 ASSESSMENT — PAIN DESCRIPTION - ORIENTATION: ORIENTATION: MID

## 2023-07-20 ASSESSMENT — PAIN SCALES - GENERAL: PAINLEVEL_OUTOF10: 5

## 2023-07-20 ASSESSMENT — PAIN DESCRIPTION - LOCATION: LOCATION: BACK

## 2023-07-20 NOTE — DISCHARGE SUMMARY
Hospital Medicine Discharge Summary    Patient: Anisha Hernandez     Gender: male  : 1960   Age: 63 y.o. MRN: [id number]    Admitting Physician: Joyce Penaloza MD  Discharge Physician: Tima Ocampo DO    Code Status: Full Code     Admit Date: 2023   Discharge Date:  2023     Disposition:  Home     Discharge Diagnoses: Active Hospital Problems    Diagnosis Date Noted    Uncontrolled type 2 diabetes mellitus with hypoglycemia without coma ([address]) [E11.649] 2023    Dehydration [E86.0] 2023    Hypoxia [R09.02] 2023    Left leg numbness [R20.0] 2023    Acute nonintractable headache [R51.9] 2023    Generalized weakness [R53.1] 2023    Pneumonia of left upper lobe due to infectious organism [J18.9]     Hyperglycemia [R73.9] 2020       Outpatient to do list:     1) Follow-up appointments:    Primary care provider    Condition at Discharge: [address] [place], [place] [address] Course:   Anisha Hernandez is a 63 y.o. male with PMH of COPD, DM II, HTN , CAD s/p PCI/CABG, CHF, Mobitz Type I/CHB s/p pacemaker who presents with worsening symptoms of weakness and blurry vision. Pt states he was recently diagnosed with diabetes and was prescribed medications but wasn't able to fill them. His glucose was in the 500's this morning. Pt was also complaints of headache and neck pain started few days ago. He has hx of chronic headache but states this is more intense. Pt also complains of LEE states it is chronic but has gotten worse lately. Denies any orthopnea or chest pain. Pt has ongoing nausea and vomiting no change. Has chronic cough. Pt also has long hx of low back pain, hasn't seen nsx before. Has been on [place] and Lyrica. Now complaining of left sided numbness feels it is new. He also reports bilateral LE weakness states it is choric. Pt also complains of abd pain, last bowel movement was today, no diarrhea.      #Left lower extremity numbness   #Chronic back pain   -MRI from  with

## 2023-07-20 NOTE — PLAN OF CARE
Problem: Discharge Planning  Goal: Discharge to home or other facility with appropriate resources  7/20/2023 1009 by Sarina Doan RN  Outcome: Progressing     Problem: Pain  Goal: Verbalizes/displays adequate comfort level or baseline comfort level  7/20/2023 1009 by Sarina Doan RN  Outcome: Progressing     Problem: Safety - Adult  Goal: Free from fall injury  7/20/2023 1009 by Sarina Doan RN  Outcome: Progressing     Problem: Chronic Conditions and Co-morbidities  Goal: Patient's chronic conditions and co-morbidity symptoms are monitored and maintained or improved  7/20/2023 1009 by Sarina Doan RN  Outcome: Progressing     Problem: Respiratory - Adult  Goal: Achieves optimal ventilation and oxygenation  7/20/2023 1009 by Sarina Dona RN  Outcome: Progressing     Problem: Cardiovascular - Adult  Goal: Maintains optimal cardiac output and hemodynamic stability  7/20/2023 1009 by Sarina Doan RN  Outcome: Progressing     Problem: Cardiovascular - Adult  Goal: Absence of cardiac dysrhythmias or at baseline  7/20/2023 1009 by Sarina Doan RN  Outcome: Progressing     Problem: Cardiovascular - Adult  Goal: Maintains optimal cardiac output and hemodynamic stability  7/20/2023 1009 by Sarina Doan RN  Outcome: Progressing

## 2023-07-20 NOTE — CARE COORDINATION
DISCHARGE ORDER  Date/Time 2023 3:36 PM  Completed by: Soco Chatterjee, Case Management    Patient Name: Jocelyn Harrison      : 1960  Admitting Diagnosis: Shortness of breath [R06.02]  Dehydration [E86.0]  Dizziness [R42]  Lactic acid acidosis [E87.20]  Hypoxia [R09.02]  Generalized weakness [R53.1]  Ill feeling [R68.89]  Left leg numbness [R20.0]  Blurred vision, bilateral [H53.8]  Acute nonintractable headache, unspecified headache type [R51.9]  Pneumonia of left upper lobe due to infectious organism [J18.9]  Uncontrolled type 2 diabetes mellitus with hypoglycemia without coma ([address]) [E11.649]  Abdominal pain with vomiting [R10.9, R11.10]  Hyperglycemia [R73.9]      Admit order Date and Status: 2023 Stable  (verify MD's last order for status of admission)      Noted discharge order. If applicable PT/OT recommendation at Discharge: Discharge Recommendations: Home with PRN assistance and Home PT  DME needs for discharge: Needs Met    Confirmed discharge plan: Yes  with whomRominaIsaac_____________  If pt confirmed DC plan does family need to be contacted by CM No     Discharge Plan: Reviewed chart, noted DC order. Pt to be DC home with [facility]. through [facility]. They will call pt directly at [facility]. DM medication to be picked up at [facility] [place]. Shashank Thakur. Pt has home oxygen at home thru [facility]. Pt verbally states understanding. Date of Last IMM Given: NA    Reviewed chart. Role of discharge planner explained and patient verbalized understanding. Discharge order is noted. Has Home O2 in place on admit:  Yes  Informed of need to bring portable home O2 tank on day of discharge for nursing to connect prior to leaving:  Yes  Verbalized agreement/Understanding:  Yes  Pt is being d/c'd to home  today. Pt's O2 sats are 93% on 4 L NC. Discharge timeout done with PT, CM, Edwin RN, Katiana Regan. All discharge needs and concerns addressed.

## 2023-07-20 NOTE — PROGRESS NOTES
Patient is being discharged from [address] [place] [address]. They are alert and oriented times 4. Speech is clear. Denies pain. Discharge education provided. Patient verbalized understanding. Family stated that they will make their own follow up appointments. They verbalized when they should follow up. Patient verbalized that they have all of their belongings. IV removed per policy and procedure. Catheter intact. Bandage applied. Patient verbalized how to stop bleeding if bleeding occurs from the site (pressure and elevation over the heart). Family is on the way to the hospital and should be here soon. Patient to call nursing staff to take him to the car when they get here.  Electronically signed by Mihai Ryan RN on 7/20/2023 at 3:52 PM

## 2023-07-20 NOTE — PROGRESS NOTES
Contacted MRI regarding the MRI that was ordered however, our MRI department does not do MRIs wi/ pacemakers. Kayy Gamboa however, does.

## 2023-07-20 NOTE — PROGRESS NOTES
D: Patient taken to his car in a wheel chair. He stood and walked to the car under his own power. No s/s of distress noted.  Electronically signed by Felix Lynne RN on 7/20/2023 at 4:23 PM

## 2023-07-20 NOTE — PLAN OF CARE
Problem: Discharge Planning  Goal: Discharge to home or other facility with appropriate resources  7/20/2023 1514 by Abdi Carr RN  Outcome: Completed     Problem: Pain  Goal: Verbalizes/displays adequate comfort level or baseline comfort level  7/20/2023 1514 by Abdi Carr RN  Outcome: Completed     Problem: Safety - Adult  Goal: Free from fall injury  7/20/2023 1514 by Abdi Carr RN  Outcome: Completed     Problem: Chronic Conditions and Co-morbidities  Goal: Patient's chronic conditions and co-morbidity symptoms are monitored and maintained or improved  7/20/2023 1514 by Abdi Carr RN  Outcome: Completed     Problem: Respiratory - Adult  Goal: Achieves optimal ventilation and oxygenation  7/20/2023 1514 by Abdi Carr RN  Outcome: Completed     Problem: Cardiovascular - Adult  Goal: Maintains optimal cardiac output and hemodynamic stability  7/20/2023 1514 by Abdi Carr RN  Outcome: Completed     Problem: Cardiovascular - Adult  Goal: Absence of cardiac dysrhythmias or at baseline  7/20/2023 1514 by Abdi Carr RN  Outcome: Completed

## 2023-07-20 NOTE — DISCHARGE INSTR - COC
Continuity of Care Form    Patient Name: Sarahi Canales   :  1960  MRN:  [id number]    Admit date:  2023  Discharge date:      Code Status Order: Full Code   Advance Directives:     Admitting Physician:  Mimi Musa DO  PCP: ANICETO Ward CNP    Discharging Nurse: [facility] Unit/Room#: 0221/0221-02  Discharging Unit Phone Number: ***    Emergency Contact:   Extended Emergency Contact Information  Primary Emergency Contact: Yulisa Arzate  Address:  [facility], [address] [facility] [place] [address] [facility] Phone: [phone]  Work Phone: [phone]  Mobile Phone: [phone]  Relation: Girlfriend    Past Surgical History:  Past Surgical History:   Procedure Laterality Date    ANKLE SURGERY Left 2021    LEFT FOOT SCREW REMOVAL performed by Rebeca Lozada MD at [address] [place] [address]  2023    COLONOSCOPY N/A 2023    COLON W/ANES.  performed by Kianna Ryan MD at [facility]  2021    CORONARY ARTERY BYPASS GRAFT N/A 2020    CORONARY ARTERY BYPASS GRAFTING X3, INTERNAL MAMMARY ARTERY, SAPHENOUS VEIN GRAFT, ON PUMP, STERNAL PLATING, 5 LEVEL BILATERAL INTERCOSTAL NERVE BLOCK, PLATELET GEL APPLICATION performed by Anya Carpenter MD at [address] [facility] [address]  2011    cystoscopy left ureteroscopy, left retrograde, double J stent placement    ESOPHAGOGASTRODUODENOSCOPY  2023    FINGER AMPUTATION Right 2021    RIGHT MIDDLE FINGER IRRIGATION AND DEBRIDEMENT WITH REVISION AMPUTATION AND BONE SHORTENING, POSSIBLE NAIL ABLATION performed by Lou Tilley MD at [address] [facility] [address]      Matrixectomy-NaOH/Phenol    PAIN MANAGEMENT PROCEDURE Right 2019    RIGHT LUMBAR FIVE SACRAL ONE TRANSFORAMINAL EPIDURAL STEROID INJECTION SITE CONFIRMED BY FLUOROSCOPY performed by Manda Barrow MD at [address] [facility]

## 2023-07-20 NOTE — PLAN OF CARE
Problem: Discharge Planning  Goal: Discharge to home or other facility with appropriate resources  7/20/2023 1017 by Bhakti Geronimo RN  Outcome: Progressing     Problem: Pain  Goal: Verbalizes/displays adequate comfort level or baseline comfort level  7/20/2023 1017 by Bhakti Geronimo RN  Outcome: Progressing     Problem: Safety - Adult  Goal: Free from fall injury  7/20/2023 1017 by Bhakti Geronimo RN  Outcome: Progressing     Problem: Chronic Conditions and Co-morbidities  Goal: Patient's chronic conditions and co-morbidity symptoms are monitored and maintained or improved  7/20/2023 1017 by Bhakti Geronimo RN  Outcome: Progressing     Problem: Respiratory - Adult  Goal: Achieves optimal ventilation and oxygenation  7/20/2023 1017 by Bhakti Geronimo RN  Outcome: Progressing     Problem: Cardiovascular - Adult  Goal: Maintains optimal cardiac output and hemodynamic stability  7/20/2023 1017 by Bhakti Geronimo RN  Outcome: Progressing

## 2023-07-20 NOTE — PLAN OF CARE
Problem: Discharge Planning  Goal: Discharge to home or other facility with appropriate resources  Outcome: Progressing     Problem: Pain  Goal: Verbalizes/displays adequate comfort level or baseline comfort level  Outcome: Progressing     Problem: Safety - Adult  Goal: Free from fall injury  Outcome: Progressing     Problem: Chronic Conditions and Co-morbidities  Goal: Patient's chronic conditions and co-morbidity symptoms are monitored and maintained or improved  Outcome: Progressing     Problem: Respiratory - Adult  Goal: Achieves optimal ventilation and oxygenation  Outcome: Progressing     Problem: Cardiovascular - Adult  Goal: Maintains optimal cardiac output and hemodynamic stability  Outcome: Progressing  Goal: Absence of cardiac dysrhythmias or at baseline  Outcome: Progressing

## 2023-07-20 NOTE — FLOWSHEET NOTE
07/19/23 2054   Assessment   Charting Type Shift assessment   Psychosocial   Psychosocial (WDL) WDL   Neurological   Neuro (WDL) WDL   Level of Consciousness 0   HEENT (Head, Ears, Eyes, Nose, & Throat)   HEENT (WDL) X   Teeth Edentulous   Right Eye Glasses; Impaired vision   Left Eye Glasses; Impaired vision   Respiratory   Respiratory (WDL) X   Respiratory Pattern Regular   Respiratory Depth Normal   Respiratory Quality/Effort Unlabored;Dyspnea at rest;Dyspnea with exertion   Chest Assessment Chest expansion symmetrical;Trachea midline   L Breath Sounds Diminished; Inspiratory Wheezes   R Breath Sounds Diminished   Level of Activity/Mobility 1   Cardiac   Cardiac (WDL) X   Cardiac Regularity Regular   Heart Sounds S1, S2   Pacemaker   Pacemaker Yes   Pacemaker Type Permanent   Gastrointestinal   Abdominal (WDL) X   Abdomen Inspection Rounded   Last BM (including prior to admit) 07/19/23   RUQ Bowel Sounds Active   LUQ Bowel Sounds Active   RLQ Bowel Sounds Active   LLQ Bowel Sounds Active   GI Symptoms Nausea;Vomiting   Tenderness Tenderness;LUQ   Genitourinary   Genitourinary (WDL) WDL   Peripheral Vascular   Peripheral Vascular (WDL) WDL   Edema None   Skin Integumentary    Skin Integumentary (WDL) X   Skin Integrity Site 2   Skin Integrity Location 2 Abrasion   Location 2 LLE   Musculoskeletal   Musculoskeletal (WDL) X  (generalized weakness)   LL Extremity Weakness  (numbness, tingling)     Shift assessment completed, see flow sheet. Pt is alert and oriented. Vital signs are WNL. Respirations are even & easy. No complaints voiced. Pt denies needs at this time. SR up x 2, and bed in low position. Call light is within reach.

## 2023-07-21 ENCOUNTER — FOLLOWUP TELEPHONE ENCOUNTER (OUTPATIENT)
Dept: ADMINISTRATIVE | Age: 63
End: 2023-07-21

## 2023-07-21 ENCOUNTER — TELEPHONE (OUTPATIENT)
Dept: FAMILY MEDICINE CLINIC | Age: 63
End: 2023-07-21

## 2023-07-21 ENCOUNTER — CARE COORDINATION (OUTPATIENT)
Dept: CASE MANAGEMENT | Age: 63
End: 2023-07-21

## 2023-07-21 NOTE — CARE COORDINATION
[facility] Care Transitions Initial Follow Up Call    Call within 2 business days of discharge: Yes      Patient: Sarahi Canales Patient : 1960   MRN: [id number]  Reason for Admission: 3 days -> LLE numbness, chronic back pain, acute on chronic migraines, dizziness, DM2 with hyperglycemia, hyperlactatemia, SIRS, chronic hypoxic resp failure on 4L nightly and 2L prn with exertion, COPD with AE, questionable nodularity CHARLI, hypokalemia, chronic combined diastolic and systolic HF, CAD s/p CABG and stents, SSS s/p pacemaker, HTN, depression, OANH does not tolerate BiPAP -> home with [facility] (?)  Discharge Date: 23 RARS: Readmission Risk Score: 15.7      Last Discharge [address]       Date Complaint Diagnosis Description Type Department Provider    23 Hyperglycemia Pneumonia of left upper lobe due to infectious organism . .. ED to Hosp-Admission (Discharged) (ADMITTED) [address] [facility]; Triston Elliott MD     1st attempt - CTN attempted follow-up outreach to patient. Message left including CTN contact information.  CTN left message for [facility] liaison Adan Kerns requesting update on referral.    Follow Up  Future Appointments   Date Time Provider [address]   2023 11:20 AM ANICETO Ward CNP [place] [facility] [place] - DYD   2023 11:50 AM SCHEDULE, Porsche Hart MMA   2023  4:00 PM ANICETO Ward CNP, [facility] [place] [facility] [place] - DYABHIJIT   10/17/2023  9:00 AM [facility] CT [facility] [place] [facility]   10/19/2023  8:45 AM Rochelle Del Castillo MD [facility]   2024 10:00 AM ANICETO Caraballo CNP [facility] [place] [facility] [place] - DYD     Rylie Munguia, RN  Care Transition Nurse  [phone] mobile

## 2023-07-21 NOTE — TELEPHONE ENCOUNTER
[address] [facility] Transitions Initial Follow Up Call    Outreach made within 2 business days of discharge: Yes    Patient: Renee Kay Patient : 1960   MRN: [id number]  Reason for Admission: There are no discharge diagnoses documented for the most recent discharge. Discharge Date: 23       Spoke with: Monique Mueller    Discharge department/facility: [facility]    Non-face-to-face services provided:  Scheduled appointment with PCP-   Obtained and reviewed discharge summary and/or continuity of care documents    TCM Interactive Patient Contact:  Was patient able to fill all prescriptions: Yes  Was patient instructed to bring all medications to the follow-up visit: Yes  Is patient taking all medications as directed in the discharge summary?  Yes  Does patient understand their discharge instructions: Yes  Does patient have questions or concerns that need addressed prior to 7-14 day follow up office visit: no    Scheduled appointment with PCP within 7-14 days    Follow Up  Future Appointments   Date Time Provider [address] [place] [address]   2023 11:20 AM ANICETO Amaya CNP, [facility] [place] - DYABHIJIT   2023 11:50 AM SCHEDULE, Yuan Hu [facility]   2023  4:00 PM ANICETO Amaya CNP, [facility] [place] - DYD   10/17/2023  9:00 AM MMO CT RM 1 [facility] FRE[facility] [place] [facility]   10/19/2023  8:45 AM Sal Mohs, MD [facility]UL MMA   2024 10:00 AM ANICETO Amaya CNP, [facility], RN

## 2023-07-21 NOTE — CARE COORDINATION
7/21  [facility]  CTN did not receive a call from  when pt discharged. CTN to follow up with pt. Sent referral to [facility] for [facility]. Spoke with Veda Rodriguez CTN in follow up.       Electronically signed by Matt Bartlett RN on 7/21/2023 at 10:26 AM

## 2023-07-21 NOTE — TELEPHONE ENCOUNTER
Heart Failure Follow-Up Call:    Call within 72 Hours of Discharge: Yes     Patient: Lois Rollins   Patient : 1960   MRN: [id number]    Date of discharge: 23    Discharge department/facility: [facility]    Discharge Disposition: Home    RARS: Readmission Risk Score: 15.7    Spoke with: Ros Nina    Patient stated still weak since discharge but okay. Left leg is tingling and numb. Stated was looked at during hospitalization. Patient upset with Dr. Bob Hernandez recommending CPAP. Patient stated he can't use CPAP because he can't sleep. Patient can't sleep on his back and often turns with restless legs and CPAP comes off. On 4-5/L baseline at home. SpO2 is 95-96% on baseline 4/L. Reinforced Heart Failure education on: signs/symptoms to monitor, medications, daily weights, low sodium diet, 2000 ml fluid restriction, and activity. Reminded patient of follow-up with PCP Blane Ledezma on  at 11:20 AM. Provided Heart Failure Nurse number at [phone] for any further questions or assistance with resources.      Follow Up  Future Appointments   Date Time Provider [address]   2023 11:20 AM ANICETO Edge CNP, [place] [facility] [place] - DYD   2023 11:50 AM SCHEDULE, Lup[facility] Arias MMA   2023  4:00 PM ANICETO Edge CNP, [facility] [place] [facility] [place] - [facility]   10/17/2023  9:00 AM MMO CT RM 1 [facility] [place] [facility]   10/19/2023  8:45 AM MD HETAL WattsRM PULM MMA   2024 10:00 AM ANICETO Morris CNP, [facility] [place] [facility] [place] - DYD       Electronically signed by Eldon Magallon, MSN, RN  on 2023 at 1:30 PM

## 2023-07-22 LAB
BACTERIA BLD CULT ORG #2: NORMAL
BACTERIA BLD CULT: NORMAL

## 2023-07-24 ENCOUNTER — CARE COORDINATION (OUTPATIENT)
Dept: CASE MANAGEMENT | Age: 63
End: 2023-07-24

## 2023-07-24 ENCOUNTER — TELEPHONE (OUTPATIENT)
Dept: FAMILY MEDICINE CLINIC | Age: 63
End: 2023-07-24

## 2023-07-24 DIAGNOSIS — I50.43 ACUTE ON CHRONIC COMBINED SYSTOLIC AND DIASTOLIC CHF (CONGESTIVE HEART FAILURE) (HCC): Primary | ICD-10-CM

## 2023-07-24 NOTE — TELEPHONE ENCOUNTER
Called and left VM with ifrah informing that pcp will sign these orders once pt is seen for his hospital follow up.

## 2023-07-24 NOTE — TELEPHONE ENCOUNTER
Leelee with [facility] DEACONESS called wanting to know if PCP would sign home care orders for skilled nursing and PT/OT. Call back [phone], okay to MYKEL on secure line.

## 2023-07-24 NOTE — CARE COORDINATION
[facility] Care Transitions Initial Follow Up Call    Call within 2 business days of discharge: Yes    Patient Current Location: Home: [address] [place] [address]    Care Transition Nurse contacted the patient by telephone to perform post hospital discharge assessment. Provided introduction to self, and explanation of the Care Transition Nurse role. Patient: Simran Navas Patient : 1960   MRN: [id number]  Reason for Admission:  3 days -> LLE numbness, chronic back pain, acute on chronic migraines, dizziness, DM2 with hyperglycemia, hyperlactatemia, SIRS, chronic hypoxic resp failure on 4L nightly and 2L prn with exertion, COPD with AE, questionable nodularity CHARLI, hypokalemia, chronic combined diastolic and systolic HF, CAD s/p CABG and stents, SSS s/p pacemaker, HTN, depression, OANH does not tolerate BiPAP -> home with [facility] (?)  Discharge Date: 23 RARS: Readmission Risk Score: 15.7      Last Discharge [address]       Date Complaint Diagnosis Description Type Department Provider    23 Hyperglycemia Pneumonia of left upper lobe due to infectious organism . .. ED to Hosp-Admission (Discharged) (ADMITTED) [address], DO; Layla Sim MD     Challenges to be reviewed by the provider   Additional needs identified to be addressed with provider: No         Method of communication with provider: none. States breathing is same. Uses rescue inhaler instead of nebulizer because neb med makes him feel sick. Wears O2 at 4L HS and prn during day. States he is not wearing O2 during day and sats 84-86% with exertion. States he had to stop 3 times walking to mailbox not sure he would get back to the house. CTN educated him on wearing O2 with SaO2 goal to be 90% or higher at all times. States his goal is to lose weight. CTN stressed again to wear O2 to maintain 90% or higher. He has appt also with endocrinology and cardiology this week.  State he is eating a low carb, low salt

## 2023-07-26 ENCOUNTER — OFFICE VISIT (OUTPATIENT)
Dept: FAMILY MEDICINE CLINIC | Age: 63
End: 2023-07-26

## 2023-07-26 VITALS
HEART RATE: 102 BPM | HEIGHT: 64 IN | SYSTOLIC BLOOD PRESSURE: 136 MMHG | BODY MASS INDEX: 51.56 KG/M2 | DIASTOLIC BLOOD PRESSURE: 82 MMHG | OXYGEN SATURATION: 93 % | WEIGHT: 302 LBS

## 2023-07-26 DIAGNOSIS — J18.9 PNEUMONIA OF LEFT LOWER LOBE DUE TO INFECTIOUS ORGANISM: ICD-10-CM

## 2023-07-26 DIAGNOSIS — R20.0 LEFT LEG NUMBNESS: ICD-10-CM

## 2023-07-26 DIAGNOSIS — J44.1 CHRONIC OBSTRUCTIVE PULMONARY DISEASE WITH ACUTE EXACERBATION (HCC): ICD-10-CM

## 2023-07-26 DIAGNOSIS — E86.0 DEHYDRATION: ICD-10-CM

## 2023-07-26 DIAGNOSIS — E87.20 LACTIC ACID ACIDOSIS: ICD-10-CM

## 2023-07-26 DIAGNOSIS — E87.6 HYPOKALEMIA: ICD-10-CM

## 2023-07-26 DIAGNOSIS — M54.50 CHRONIC LOW BACK PAIN, UNSPECIFIED BACK PAIN LATERALITY, UNSPECIFIED WHETHER SCIATICA PRESENT: ICD-10-CM

## 2023-07-26 DIAGNOSIS — R51.9 ACUTE NONINTRACTABLE HEADACHE, UNSPECIFIED HEADACHE TYPE: ICD-10-CM

## 2023-07-26 DIAGNOSIS — G89.29 CHRONIC LOW BACK PAIN, UNSPECIFIED BACK PAIN LATERALITY, UNSPECIFIED WHETHER SCIATICA PRESENT: ICD-10-CM

## 2023-07-26 DIAGNOSIS — E11.65 TYPE 2 DIABETES MELLITUS WITH HYPERGLYCEMIA, WITHOUT LONG-TERM CURRENT USE OF INSULIN (HCC): Primary | ICD-10-CM

## 2023-07-26 DIAGNOSIS — Z09 HOSPITAL DISCHARGE FOLLOW-UP: ICD-10-CM

## 2023-07-26 DIAGNOSIS — R42 DIZZINESS: ICD-10-CM

## 2023-07-26 RX ORDER — LEVOFLOXACIN 750 MG/1
750 TABLET ORAL DAILY
Qty: 3 TABLET | Refills: 0 | Status: SHIPPED | OUTPATIENT
Start: 2023-07-26 | End: 2023-07-29

## 2023-07-31 ENCOUNTER — CARE COORDINATION (OUTPATIENT)
Dept: CASE MANAGEMENT | Age: 63
End: 2023-07-31

## 2023-07-31 NOTE — CARE COORDINATION
REHABILITATION [facility] Care Transitions Follow Up Call      Patient: Tiffany Watson  Patient : 1960   MRN: [id number]  Reason for Admission: 3 days -> LLE numbness, chronic back pain, acute on chronic migraines, dizziness, DM2 with hyperglycemia, hyperlactatemia, SIRS, chronic hypoxic resp failure on 4L nightly and 2L prn with exertion, COPD with AE, questionable nodularity CHARLI, hypokalemia, chronic combined diastolic and systolic HF, CAD s/p CABG and stents, SSS s/p pacemaker, HTN, depression, OANH does not tolerate BiPAP -> home with [facility]  Discharge Date: 23 RARS: Readmission Risk Score: 15.7    CTN attempted follow-up outreach to patient. Message left including CTN contact information.      Follow Up  Future Appointments   Date Time Provider [address]   2023 11:50 AM SCHEDULE, Douglas Crowell [facility]   2023  4:00 PM ANICETO Sutton CNP, [place] [facility] [place] - SANGEETA   10/17/2023  9:00 AM MMO CT RM 1 MHCZ [facility] Rad   10/19/2023  8:45 AM MD SHAHLA Singh [facility]   2024 10:00 AM ANICETO Meyers CNP, [place] [facility] [place] - SANGEETA Rg, RN  Care Transition Nurse  [phone] mobile

## 2023-07-31 NOTE — PROGRESS NOTES
or edema  Musculoskeletal: normal range of motion, no joint swelling, deformity or tenderness  Neurologic: reflexes normal and symmetric, no cranial nerve deficit, gait, coordination and speech normal      An electronic signature was used to authenticate this note.   --ANICETO Fox - CNP

## 2023-08-02 ENCOUNTER — TELEPHONE (OUTPATIENT)
Dept: FAMILY MEDICINE CLINIC | Age: 63
End: 2023-08-02

## 2023-08-02 RX ORDER — TIRZEPATIDE 5 MG/.5ML
5 INJECTION, SOLUTION SUBCUTANEOUS
COMMUNITY
Start: 2023-07-27

## 2023-08-02 NOTE — TELEPHONE ENCOUNTER
RN called patient to follow up with medical visits this week. PCP  7/26/23    Dr. Princess Davies , Cardiology 7/28/23    Dr. Shade Lugo , Endocrinology on 7/27/23. He is scheduled to follow up I 90 days. He was informed to stop Trulicity and start Mounjaro 2.5 mg weekly injection. Med list updated. He is using Motostrano CGM. Records uploaded into media for past 2 weeks. He states that he is SOB on 4 L of O2. He states his breathing is about the same as his last PCP office visit. Wt is currently 291.2  He was 302 at PCP visit. Down 10.8 #     Home Care OT is active . He is unsure which agency. Plan : RN will follow up next week for update.        Next Appointment:   Future Appointments   Date Time Provider [address]   8/22/2023 11:50 AM SCHEDULE, Edgar Cole REMOTE Trenia Cassette [facility]   8/23/2023  4:00 PM ANICETO Shahid CNP, [place]  [place] - DYABHIJIT   10/17/2023  9:00 AM MMO CT RM 1 MHCZ [facility] [place] [facility]   10/19/2023  8:45 AM MD SHAHLA Trent PULM [facility]   1/17/2024 10:00 AM ANICETO Lynn CNP, [place]  [place] - [place]

## 2023-08-03 ENCOUNTER — CARE COORDINATION (OUTPATIENT)
Dept: CASE MANAGEMENT | Age: 63
End: 2023-08-03

## 2023-08-03 NOTE — CARE COORDINATION
any barriers to care and/or understanding of plan of care after discharge. Discussed appropriate site of care based on symptoms and resources available to patient including: PCP  Specialist  Home health. The patient agrees to contact the PCP office for questions related to their healthcare. Patients top risk factors for readmission: medical condition-see above  Interventions to address risk factors: Education of patient/family/caregiver/guardian to support self-management-s/s monitor     Care Transition Nurse provided contact information for future needs. Plan for follow-up call in 5-7 days based on severity of symptoms and risk factors.   Plan for next call: symptom management-breathing    Miryam Reed RN  Care Transition Nurse  [phone] mobile

## 2023-08-07 DIAGNOSIS — E55.9 VITAMIN D DEFICIENCY: ICD-10-CM

## 2023-08-07 RX ORDER — METHOCARBAMOL 750 MG/1
TABLET ORAL
Qty: 4 CAPSULE | Refills: 2 | Status: SHIPPED | OUTPATIENT
Start: 2023-08-07

## 2023-08-07 NOTE — TELEPHONE ENCOUNTER
Refill Request     CONFIRM preferrred pharmacy with the patient. If Mail Order Rx - Pend for 90 day refill. Last Seen: Last Seen Department: 7/26/2023  Last Seen by PCP: 2/1/2021    Last Written: 1/31/23    If no future appointment scheduled, route STAFF MESSAGE with patient name to the [facility] for scheduling. Next Appointment:   Future Appointments   Date Time Provider [address]   8/22/2023 11:50 AM SCHEDULE, Miguel Bassett [facility]   8/23/2023  4:00 PM ANICETO Lew CNP [place]  [place] - [place]   10/17/2023  9:00 AM MMO CT RM 1 MHCZ [facility] [place] [facility]   10/19/2023  8:45 [facility]   1/17/2024 10:00 AM ANICETO Cowan CNP [place]  [place] - DYD       Message sent to  to schedule appt with patient?   YES      Requested Prescriptions     Pending Prescriptions Disp Refills    vitamin D (D3-50) 42910 UNIT CAPS [Pharmacy Med Name: VITAMIN D3-50 (CHOLECAL) 50,000U CP] 4 capsule 2     Sig: TAKE ONE CAPSULE BY MOUTH ONCE WEEKLY

## 2023-08-09 ENCOUNTER — TELEPHONE (OUTPATIENT)
Dept: ORTHOPEDIC SURGERY | Age: 63
End: 2023-08-09

## 2023-08-10 ENCOUNTER — CARE COORDINATION (OUTPATIENT)
Dept: CASE MANAGEMENT | Age: 63
End: 2023-08-10

## 2023-08-10 NOTE — CARE COORDINATION
Tiny Romo, APRN - CNP [facility] [place] - DYD     Non-[facility] follow up appointment(s): [facility] cardiology    Care Transition Nurse reviewed medical action plan and red flags with patient and discussed any barriers to care and/or understanding of plan of care after discharge. Discussed appropriate site of care based on symptoms and resources available to patient including: PCP  Specialist  Home health. The patient agrees to contact the PCP office for questions related to their healthcare. Patients top risk factors for readmission: medical condition-see above  Interventions to address risk factors: Education of patient/family/caregiver/guardian to support self-management-     Care Transition Nurse provided contact information for future needs. Plan for follow-up call in 5-7 days based on severity of symptoms and risk factors.   Plan for next call: symptom management-DM COPD CHF    Radha Joy RN  Care Transition Nurse  [phone] mobile

## 2023-08-17 PROBLEM — E86.0 DEHYDRATION: Status: RESOLVED | Noted: 2023-07-18 | Resolved: 2023-08-17

## 2023-08-18 ENCOUNTER — CARE COORDINATION (OUTPATIENT)
Dept: CASE MANAGEMENT | Age: 63
End: 2023-08-18

## 2023-08-18 NOTE — CARE COORDINATION
[address] [address] Judge [facility] Care Transitions Follow Up Call    Patient Current Location: Home: [address] [place] [address]    Care Transition Nurse contacted the patient by telephone to follow up after recent hospital admission. Patient: Anisha Hernandez  Patient : 1960   MRN: [id number]  Reason for Admission: 3 days -> LLE numbness, chronic back pain, acute on chronic migraines, dizziness, DM2 with hyperglycemia, hyperlactatemia, SIRS, chronic hypoxic resp failure on 4L nightly and 2L prn with exertion, COPD with AE, questionable nodularity CHARLI, hypokalemia, chronic combined diastolic and systolic HF, CAD s/p CABG and stents, SSS s/p pacemaker, HTN, depression, OANH does not tolerate BiPAP -> home with [facility]  Discharge Date: 23 RARS: Readmission Risk Score: 15.7      Needs to be reviewed by the provider   Additional needs identified to be addressed with provider: No         Method of communication with provider: none. States he had a fall down steps earlier this week. He was trying to break up a dog fight, turned and moved quickly missing a step. Denies hitting his head. Has some scrapes on his arms. Denies any injury other than abrasions. Weight yesterday 291# - hasn't weighed yet today  O2 at 4L HS and 2L prn with exertion and maintaining 90s - has not required daytime O2    Breathing is good. [facility] to visit today. Had EP visit earlier this week and reports went well and next visit in one year. Taking medications as listed - reviewed on call. CTN reviewed s/s to report to PCP. CTN encouraged him to keep moving as tolerated while working out the soreness from his fall earlier this week. Goal to monitor O2 sats, deep breathe to avoid PNA. States he is sore in rib cage. He sees PCP next Wednesday and if having any lingering issues/concerns he is to contact sooner or discuss at that appt. He denies needs. Has CTN contact info for future needs. Care transition outreach complete.      Addressed changes since

## 2023-08-23 ENCOUNTER — OFFICE VISIT (OUTPATIENT)
Dept: FAMILY MEDICINE CLINIC | Age: 63
End: 2023-08-23
Payer: COMMERCIAL

## 2023-08-23 VITALS
BODY MASS INDEX: 51.22 KG/M2 | OXYGEN SATURATION: 89 % | SYSTOLIC BLOOD PRESSURE: 71 MMHG | HEART RATE: 99 BPM | HEIGHT: 64 IN | DIASTOLIC BLOOD PRESSURE: 39 MMHG | WEIGHT: 300 LBS

## 2023-08-23 DIAGNOSIS — E11.9 TYPE 2 DIABETES MELLITUS WITHOUT COMPLICATION, WITHOUT LONG-TERM CURRENT USE OF INSULIN (HCC): ICD-10-CM

## 2023-08-23 DIAGNOSIS — F32.9 REACTIVE DEPRESSION: Primary | ICD-10-CM

## 2023-08-23 DIAGNOSIS — R42 ORTHOSTATIC LIGHTHEADEDNESS: ICD-10-CM

## 2023-08-23 DIAGNOSIS — I50.42 CHRONIC COMBINED SYSTOLIC AND DIASTOLIC CONGESTIVE HEART FAILURE (HCC): ICD-10-CM

## 2023-08-23 DIAGNOSIS — I95.9 HYPOTENSION, UNSPECIFIED HYPOTENSION TYPE: ICD-10-CM
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
## 2023-08-23 PROCEDURE — 3046F HEMOGLOBIN A1C LEVEL >9.0%: CPT | Performed by: NURSE PRACTITIONER

## 2023-08-23 PROCEDURE — G8417 CALC BMI ABV UP PARAM F/U: HCPCS | Performed by: NURSE PRACTITIONER

## 2023-08-23 PROCEDURE — 99213 OFFICE O/P EST LOW 20 MIN: CPT | Performed by: NURSE PRACTITIONER

## 2023-08-23 PROCEDURE — G8427 DOCREV CUR MEDS BY ELIG CLIN: HCPCS | Performed by: NURSE PRACTITIONER

## 2023-08-23 PROCEDURE — 36415 COLL VENOUS BLD VENIPUNCTURE: CPT | Performed by: NURSE PRACTITIONER

## 2023-08-23 PROCEDURE — 3017F COLORECTAL CA SCREEN DOC REV: CPT | Performed by: NURSE PRACTITIONER

## 2023-08-23 PROCEDURE — 1036F TOBACCO NON-USER: CPT | Performed by: NURSE PRACTITIONER

## 2023-08-23 PROCEDURE — 2022F DILAT RTA XM EVC RTNOPTHY: CPT | Performed by: NURSE PRACTITIONER

## 2023-08-23 RX ORDER — DULAGLUTIDE 1.5 MG/.5ML
1.5 INJECTION, SOLUTION SUBCUTANEOUS
COMMUNITY
Start: 2023-08-11

## 2023-08-23 ASSESSMENT — ANXIETY QUESTIONNAIRES
4. TROUBLE RELAXING: 2
5. BEING SO RESTLESS THAT IT IS HARD TO SIT STILL: 2
6. BECOMING EASILY ANNOYED OR IRRITABLE: 1
2. NOT BEING ABLE TO STOP OR CONTROL WORRYING: 3
IF YOU CHECKED OFF ANY PROBLEMS ON THIS QUESTIONNAIRE, HOW DIFFICULT HAVE THESE PROBLEMS MADE IT FOR YOU TO DO YOUR WORK, TAKE CARE OF THINGS AT HOME, OR GET ALONG WITH OTHER PEOPLE: SOMEWHAT DIFFICULT
GAD7 TOTAL SCORE: 15
3. WORRYING TOO MUCH ABOUT DIFFERENT THINGS: 3
1. FEELING NERVOUS, ANXIOUS, OR ON EDGE: 3
7. FEELING AFRAID AS IF SOMETHING AWFUL MIGHT HAPPEN: 1

## 2023-08-23 ASSESSMENT — PATIENT HEALTH QUESTIONNAIRE - PHQ9
SUM OF ALL RESPONSES TO PHQ9 QUESTIONS 1 & 2: 5
SUM OF ALL RESPONSES TO PHQ QUESTIONS 1-9: 13
SUM OF ALL RESPONSES TO PHQ QUESTIONS 1-9: 13
8. MOVING OR SPEAKING SO SLOWLY THAT OTHER PEOPLE COULD HAVE NOTICED. OR THE OPPOSITE, BEING SO FIGETY OR RESTLESS THAT YOU HAVE BEEN MOVING AROUND A LOT MORE THAN USUAL: 0
6. FEELING BAD ABOUT YOURSELF - OR THAT YOU ARE A FAILURE OR HAVE LET YOURSELF OR YOUR FAMILY DOWN: 0
7. TROUBLE CONCENTRATING ON THINGS, SUCH AS READING THE NEWSPAPER OR WATCHING TELEVISION: 2
1. LITTLE INTEREST OR PLEASURE IN DOING THINGS: 2
3. TROUBLE FALLING OR STAYING ASLEEP: 3
4. FEELING TIRED OR HAVING LITTLE ENERGY: 3
5. POOR APPETITE OR OVEREATING: 0
9. THOUGHTS THAT YOU WOULD BE BETTER OFF DEAD, OR OF HURTING YOURSELF: 0
10. IF YOU CHECKED OFF ANY PROBLEMS, HOW DIFFICULT HAVE THESE PROBLEMS MADE IT FOR YOU TO DO YOUR WORK, TAKE CARE OF THINGS AT HOME, OR GET ALONG WITH OTHER PEOPLE: 1
2. FEELING DOWN, DEPRESSED OR HOPELESS: 3
SUM OF ALL RESPONSES TO PHQ QUESTIONS 1-9: 13
SUM OF ALL RESPONSES TO PHQ QUESTIONS 1-9: 13

## 2023-08-23 NOTE — PROGRESS NOTES
[address] [address]  Dept: [phone]  Dept Fax: [phone]  Loc: [phone]    Marcy Mcnamara is a 66 y.o. male who presents today for his medical conditions/complaints as noted below. Marcy Mcnamara is c/o of Anxiety and Depression       Subjective:     Chief Complaint   Patient presents with    Anxiety    Depression       HPI  Falls  Last week fell off porch on to concrete. Was walking and then leaned over to pet dog and then came back up and then got lighheaded and then fell. He reports frequent falls over the past few weeks. He states he also fell once after getting up out of bed   Also fell Friday after getting up quickly. The patient admits that the falls and lightheadedness are occurring after changing positions. Mood Disorder:  Patient presents for follow-up of depression and anxiety disorder. Current complaints include: See PHQ9 below . He denies tearfulness, decreased libido, irritability, excessive worry, panic attacks, obsessive thoughts, compulsive behaviors, increased use of drugs or alcohol, suicidal thoughts or behavior, and impaired memory. Symptoms/signs of lev: none. External stressors: nothing new. Current treatment includes: Pristiq- 50 mg daily--initiated on 7/12/2023 and elavil 25 mg nightly--increased on 7/12/2023. Medication side effects: none.         PHQ-9  8/23/2023 7/12/2023 3/3/2023 2/8/2023 1/12/2023 11/23/2022 6/29/2022   Little interest or pleasure in doing things 2 3 3 3 3 0 2   Feeling down, depressed, or hopeless 3 0 1 0 2 2 1   Trouble falling or staying asleep, or sleeping too much 3 3 3 3 2 2 2   Feeling tired or having little energy 3 3 3 3 1 2 3   Poor appetite or overeating 0 1 3 0 0 1 3   Feeling bad about yourself - or that you are a failure or have let yourself or your family down 0 0 0 0 0 0 0   Trouble concentrating on things, such as reading the newspaper or

## 2023-08-24 LAB
ANION GAP SERPL CALCULATED.3IONS-SCNC: 15 MMOL/L (ref 3–16)
BUN SERPL-MCNC: 17 MG/DL (ref 7–20)
CALCIUM SERPL-MCNC: 9.5 MG/DL (ref 8.3–10.6)
CHLORIDE SERPL-SCNC: 95 MMOL/L (ref 99–110)
CO2 SERPL-SCNC: 31 MMOL/L (ref 21–32)
CREAT SERPL-MCNC: 1.4 MG/DL (ref 0.8–1.3)
GFR SERPLBLD CREATININE-BSD FMLA CKD-EPI: 57 ML/MIN/{1.73_M2}
GLUCOSE SERPL-MCNC: 108 MG/DL (ref 70–99)
POTASSIUM SERPL-SCNC: 4.7 MMOL/L (ref 3.5–5.1)
SODIUM SERPL-SCNC: 141 MMOL/L (ref 136–145)

## 2023-08-29 ASSESSMENT — ENCOUNTER SYMPTOMS
GASTROINTESTINAL NEGATIVE: 1
BLOOD IN STOOL: 0
ANAL BLEEDING: 0
RESPIRATORY NEGATIVE: 1
ABDOMINAL PAIN: 0
NAUSEA: 0
SHORTNESS OF BREATH: 0
EYES NEGATIVE: 1
ALLERGIC/IMMUNOLOGIC NEGATIVE: 1

## 2023-08-31 ENCOUNTER — NURSE ONLY (OUTPATIENT)
Dept: FAMILY MEDICINE CLINIC | Age: 63
End: 2023-08-31
Payer: COMMERCIAL

## 2023-08-31 DIAGNOSIS — I95.9 HYPOTENSION, UNSPECIFIED HYPOTENSION TYPE: Primary | ICD-10-CM

## 2023-08-31 LAB
ANION GAP SERPL CALCULATED.3IONS-SCNC: 13 MMOL/L (ref 3–16)
BUN SERPL-MCNC: 13 MG/DL (ref 7–20)
CALCIUM SERPL-MCNC: 9.1 MG/DL (ref 8.3–10.6)
CHLORIDE SERPL-SCNC: 98 MMOL/L (ref 99–110)
CO2 SERPL-SCNC: 31 MMOL/L (ref 21–32)
CREAT SERPL-MCNC: 0.8 MG/DL (ref 0.8–1.3)
GFR SERPLBLD CREATININE-BSD FMLA CKD-EPI: >60 ML/MIN/{1.73_M2}
GLUCOSE SERPL-MCNC: 124 MG/DL (ref 70–99)
POTASSIUM SERPL-SCNC: 3.6 MMOL/L (ref 3.5–5.1)
SODIUM SERPL-SCNC: 142 MMOL/L (ref 136–145)

## 2023-08-31 PROCEDURE — 36415 COLL VENOUS BLD VENIPUNCTURE: CPT | Performed by: NURSE PRACTITIONER

## 2023-09-01 ENCOUNTER — TELEPHONE (OUTPATIENT)
Dept: FAMILY MEDICINE CLINIC | Age: 63
End: 2023-09-01

## 2023-09-01 NOTE — TELEPHONE ENCOUNTER
Spoke with patient  Advised on message.   He states that the onlt change in the [facility] in only 1 po QAM now everything else is the same

## 2023-09-01 NOTE — TELEPHONE ENCOUNTER
----- Message from Gregg Brower, APRN - CNP sent at 9/1/2023  3:40 PM EDT -----  Kidney fx has returned to normal.  Please clarify if patients cardio changed his torsemide or any other medications

## 2023-09-08 ENCOUNTER — TELEPHONE (OUTPATIENT)
Dept: PULMONOLOGY | Age: 63
End: 2023-09-08

## 2023-09-08 DIAGNOSIS — J44.9 MODERATE COPD (CHRONIC OBSTRUCTIVE PULMONARY DISEASE) (HCC): Primary | ICD-10-CM

## 2023-09-08 DIAGNOSIS — R06.02 SOB (SHORTNESS OF BREATH): ICD-10-CM

## 2023-09-08 DIAGNOSIS — G47.33 SEVERE OBSTRUCTIVE SLEEP APNEA: ICD-10-CM

## 2023-09-18 ENCOUNTER — TELEPHONE (OUTPATIENT)
Dept: FAMILY MEDICINE CLINIC | Age: 63
End: 2023-09-18

## 2023-09-18 DIAGNOSIS — R05.1 ACUTE COUGH: Primary | ICD-10-CM

## 2023-09-18 DIAGNOSIS — R06.02 SHORTNESS OF BREATH: ICD-10-CM

## 2023-09-18 RX ORDER — ONDANSETRON 4 MG/1
TABLET, FILM COATED ORAL
Qty: 30 TABLET | Refills: 2 | Status: SHIPPED | OUTPATIENT
Start: 2023-09-18

## 2023-09-18 RX ORDER — BENZONATATE 200 MG/1
200 CAPSULE ORAL 3 TIMES DAILY PRN
Qty: 21 CAPSULE | Refills: 0 | Status: SHIPPED | OUTPATIENT
Start: 2023-09-18 | End: 2023-09-25

## 2023-09-18 NOTE — TELEPHONE ENCOUNTER
Refill Request     CONFIRM preferrred pharmacy with the patient. If Mail Order Rx - Pend for 90 day refill. Last Seen: Last Seen Department: 8/23/2023  Last Seen by PCP: 2/1/2021    Last Written: 3/3/2023    If no future appointment scheduled, route STAFF MESSAGE with patient name to the [facility] for scheduling. Next Appointment:   Future Appointments   Date Time Provider [address]   10/17/2023  9:00 AM MMO CT RM 1 MHCZ [facility]   10/19/2023  8:45 AM MD SHAHLA Galeas PULM [facility]   1/17/2024 10:00 AM Katy Telles, APRN - CNP [facility] [place] - DYD       Message sent to  to schedule appt with patient?   NO      Requested Prescriptions     Pending Prescriptions Disp Refills    ondansetron (ZOFRAN) 4 MG tablet 30 tablet 2

## 2023-09-18 NOTE — TELEPHONE ENCOUNTER
Evangeline Duverney from [address] [place] [address] [phone] for the past 5 days pt has been short of breath. Having a lot of green phylum. and a cough, was wanting to see if she could get an order for a chest X-ray and get some Tessalon pearls sent to [address] [facility]

## 2023-09-20 ENCOUNTER — HOSPITAL ENCOUNTER (OUTPATIENT)
Age: 63
Discharge: HOME | End: 2023-09-20
Payer: COMMERCIAL

## 2023-09-20 ENCOUNTER — HOSPITAL ENCOUNTER (OUTPATIENT)
Dept: GENERAL RADIOLOGY | Age: 63
Discharge: HOME | End: 2023-09-20
Payer: COMMERCIAL

## 2023-09-20 DIAGNOSIS — R06.02 SHORTNESS OF BREATH: ICD-10-CM

## 2023-09-20 DIAGNOSIS — R05.1 ACUTE COUGH: ICD-10-CM

## 2023-09-20 PROCEDURE — 71046 X-RAY EXAM CHEST 2 VIEWS: CPT

## 2023-09-26 RX ORDER — AMITRIPTYLINE HYDROCHLORIDE 10 MG/1
TABLET, FILM COATED ORAL
Qty: 30 TABLET | Refills: 1 | OUTPATIENT
Start: 2023-09-26

## 2023-09-26 RX ORDER — PREDNISONE 10 MG/1
TABLET ORAL
Qty: 30 TABLET | Refills: 0 | OUTPATIENT
Start: 2023-09-26

## 2023-10-17 ENCOUNTER — HOSPITAL ENCOUNTER (OUTPATIENT)
Dept: CT IMAGING | Age: 63
Discharge: HOME | End: 2023-10-17
Payer: COMMERCIAL

## 2023-10-17 DIAGNOSIS — R93.89 ABNORMAL CT OF THE CHEST: ICD-10-CM

## 2023-10-17 PROCEDURE — 71250 CT THORAX DX C-: CPT

## 2023-10-19 ENCOUNTER — OFFICE VISIT (OUTPATIENT)
Dept: ORTHOPEDIC SURGERY | Age: 63
End: 2023-10-19
Payer: COMMERCIAL

## 2023-10-19 ENCOUNTER — TELEPHONE (OUTPATIENT)
Dept: ORTHOPEDIC SURGERY | Age: 63
End: 2023-10-19

## 2023-10-19 ENCOUNTER — OFFICE VISIT (OUTPATIENT)
Dept: PULMONOLOGY | Age: 63
End: 2023-10-19
Payer: COMMERCIAL

## 2023-10-19 VITALS
HEIGHT: 64 IN | DIASTOLIC BLOOD PRESSURE: 74 MMHG | WEIGHT: 308 LBS | BODY MASS INDEX: 52.58 KG/M2 | HEART RATE: 73 BPM | OXYGEN SATURATION: 91 % | SYSTOLIC BLOOD PRESSURE: 134 MMHG

## 2023-10-19 VITALS — WEIGHT: 308 LBS | HEIGHT: 64 IN | BODY MASS INDEX: 52.58 KG/M2

## 2023-10-19 DIAGNOSIS — G47.33 SEVERE OBSTRUCTIVE SLEEP APNEA: ICD-10-CM

## 2023-10-19 DIAGNOSIS — J44.9 MODERATE COPD (CHRONIC OBSTRUCTIVE PULMONARY DISEASE) (HCC): Primary | ICD-10-CM

## 2023-10-19 DIAGNOSIS — D17.79 EPIDURAL LIPOMATOSIS: Primary | ICD-10-CM

## 2023-10-19 DIAGNOSIS — E66.9 OBESITY, UNSPECIFIED CLASSIFICATION, UNSPECIFIED OBESITY TYPE, UNSPECIFIED WHETHER SERIOUS COMORBIDITY PRESENT: ICD-10-CM

## 2023-10-19 DIAGNOSIS — R93.89 ABNORMAL CT OF THE CHEST: ICD-10-CM

## 2023-10-19 PROCEDURE — G8427 DOCREV CUR MEDS BY ELIG CLIN: HCPCS | Performed by: ORTHOPAEDIC SURGERY

## 2023-10-19 PROCEDURE — G8417 CALC BMI ABV UP PARAM F/U: HCPCS | Performed by: ORTHOPAEDIC SURGERY

## 2023-10-19 PROCEDURE — 3023F SPIROM DOC REV: CPT | Performed by: INTERNAL MEDICINE

## 2023-10-19 PROCEDURE — 1036F TOBACCO NON-USER: CPT | Performed by: INTERNAL MEDICINE

## 2023-10-19 PROCEDURE — G8484 FLU IMMUNIZE NO ADMIN: HCPCS | Performed by: INTERNAL MEDICINE

## 2023-10-19 PROCEDURE — 3017F COLORECTAL CA SCREEN DOC REV: CPT | Performed by: INTERNAL MEDICINE

## 2023-10-19 PROCEDURE — 99214 OFFICE O/P EST MOD 30 MIN: CPT | Performed by: ORTHOPAEDIC SURGERY

## 2023-10-19 PROCEDURE — G8427 DOCREV CUR MEDS BY ELIG CLIN: HCPCS | Performed by: INTERNAL MEDICINE

## 2023-10-19 PROCEDURE — G8484 FLU IMMUNIZE NO ADMIN: HCPCS | Performed by: ORTHOPAEDIC SURGERY

## 2023-10-19 PROCEDURE — 99214 OFFICE O/P EST MOD 30 MIN: CPT | Performed by: INTERNAL MEDICINE

## 2023-10-19 PROCEDURE — 1036F TOBACCO NON-USER: CPT | Performed by: ORTHOPAEDIC SURGERY

## 2023-10-19 PROCEDURE — G8417 CALC BMI ABV UP PARAM F/U: HCPCS | Performed by: INTERNAL MEDICINE

## 2023-10-19 PROCEDURE — 3017F COLORECTAL CA SCREEN DOC REV: CPT | Performed by: ORTHOPAEDIC SURGERY

## 2023-10-19 RX ORDER — FLUTICASONE FUROATE, UMECLIDINIUM BROMIDE AND VILANTEROL TRIFENATATE 100; 62.5; 25 UG/1; UG/1; UG/1
1 POWDER RESPIRATORY (INHALATION) DAILY
Qty: 3 EACH | Refills: 1 | Status: SHIPPED | OUTPATIENT
Start: 2023-10-19

## 2023-10-19 RX ORDER — ALBUTEROL SULFATE 90 UG/1
2 AEROSOL, METERED RESPIRATORY (INHALATION) EVERY 4 HOURS PRN
Qty: 3 EACH | Refills: 1 | Status: SHIPPED | OUTPATIENT
Start: 2023-10-19

## 2023-10-19 RX ORDER — ALBUTEROL SULFATE 2.5 MG/3ML
2.5 SOLUTION RESPIRATORY (INHALATION) EVERY 6 HOURS PRN
Qty: 360 EACH | Refills: 5 | Status: SHIPPED | OUTPATIENT
Start: 2023-10-19

## 2023-10-19 NOTE — PROGRESS NOTES
New Patient: LUMBAR SPINE    Referring Provider:  No ref. provider found    CHIEF COMPLAINT:    Chief Complaint   Patient presents with    Follow-up     Lumbar spine         HISTORY OF PRESENT ILLNESS:     Mr. Steve Bautista  is a pleasant 66 y.o. male here for consultation regarding his LBP and bilateral leg pain. He states his pain began insidiously several months ago. His pain has steadily increased since then. He rates his back pain 7/10 and leg pain 7/10. He describes the pain as aching. Pain is worse with standing and walking and improved some with sitting in a recliner. The leg pain radiates down the anterior aspect of his leg to his feet. He notes intermittent numbness and tingling in his legs and feet. He denies weakness of his legs and denies saddle numbness or bowel or bladder dysfunction. The pain moderately disrupts his sleep. He has been using a cane for 3 months. He is currently in cardiac rehab.      Current/Past Treatment:   Physical Therapy: Cardiac rehab  Chiropractic:  No   Injection:  GABRIEL previously with 2 years relief   Medications:  Cymbalta , lyricA    Past Medical History:   Past Medical History:   Diagnosis Date    Acute diverticulitis 04/10/2021    Acute kidney injury ([address]) 04/08/2021    Acute on chronic respiratory failure with hypoxemia (HCC)     Acute respiratory failure ([address]) 08/19/2020    Acute respiratory failure with hypoxia ([address])     Anxiety 01/06/2020    Aortic valve calcification 09/2020    seen on lung CT    CAD in native artery 04/14/2021    Chronic obstructive pulmonary disease ([address]) 09/03/2019    PFT done 9/03/19    Chronic systolic congestive heart failure ([address]) 04/10/2021    Class 3 severe obesity with body mass index (BMI) of 45.0 to 49.9 in adult [facility])     Coronary artery calcification 09/2020    seen on lung ct    Coronary artery disease involving native heart with angina pectoris ([address]) 09/22/2020    Crush injury of right foot 07/23/2015    DDD (degenerative disc

## 2023-10-19 NOTE — PROGRESS NOTES
P Pulmonary[facility] Care [facility]                                                                CHIEF COMPLAINT: follow up OANH/CT/COPD      HPI:   CT chest reviewed by me and noted below. Results were dicussed with patient and multiple good questions were answered. Feels pretty good  Patient is compliant with inhaled bronchodilators and O2. Uses O2 at night- can not ware BiPAP.    O2 sat during the day remains > 90        Past Medical History:   Diagnosis Date    Acute diverticulitis 04/10/2021    Acute kidney injury ([address]) 04/08/2021    Acute on chronic respiratory failure with hypoxemia (HCC)     Acute respiratory failure ([address]) 08/19/2020    Acute respiratory failure with hypoxia ([address])     Anxiety 01/06/2020    Aortic valve calcification 09/2020    seen on lung CT    CAD in native artery 04/14/2021    Chronic obstructive pulmonary disease ([address]) 09/03/2019    PFT done 9/03/19    Chronic systolic congestive heart failure ([address]) 04/10/2021    Class 3 severe obesity with body mass index (BMI) of 45.0 to 49.9 in adult [facility])     Coronary artery calcification 09/2020    seen on lung ct    Coronary artery disease involving native heart with angina pectoris ([address]) 09/22/2020    Crush injury of right foot 07/23/2015    DDD (degenerative disc disease), lumbar 01/06/2020    Depression 01/06/2020    Diverticulitis of colon 04/10/2021    Erectile dysfunction 01/06/2020    Fatigue 01/06/2020    HNP (herniated nucleus pulposus), lumbar 01/06/2020    Hyperglycemia 01/06/2020    Hyperlipidemia     Hypersomnia 01/06/2020    Hypertension     Insomnia     Ischemic cardiomyopathy     Kidney stone     Lumbar radiculopathy 01/06/2020    Lumbar spine pain 01/06/2020    LVH (left ventricular hypertrophy)     seen on echo 8/2020, moderate    Mobitz type I Wenckebach atrioventricular block 04/10/2021    Moderate protein-calorie malnutrition ([address]) 03/17/2020    Observed sleep apnea

## 2023-10-25 ENCOUNTER — TELEPHONE (OUTPATIENT)
Dept: ORTHOPEDIC SURGERY | Age: 63
End: 2023-10-25

## 2023-10-25 NOTE — TELEPHONE ENCOUNTER
General Question     Subject: [facility]  Patient and /or Facility Request: Sarahi Canales  Contact Number: [phone]    PT CLLED REQ CLL BK TO [address] SX  CLL PT TO ADV

## 2023-10-26 ENCOUNTER — TELEPHONE (OUTPATIENT)
Dept: PULMONOLOGY | Age: 63
End: 2023-10-26

## 2023-10-26 RX ORDER — BENZONATATE 200 MG/1
CAPSULE ORAL
Qty: 21 CAPSULE | Refills: 0 | Status: SHIPPED | OUTPATIENT
Start: 2023-10-26

## 2023-10-26 NOTE — TELEPHONE ENCOUNTER
Refill Request     CONFIRM preferrred pharmacy with the patient. If Mail Order Rx - Pend for 90 day refill. Last Seen: Last Seen Department: 8/23/2023  Last Seen by PCP: 8/23/2023    Last Written: 9/18/2023    If no future appointment scheduled, route STAFF MESSAGE with patient name to the [facility] for scheduling. Next Appointment:   Future Appointments   Date Time Provider [address] [place] [address]   1/17/2024 10:00 AM Virgil Marrero APRN - CNP [facility]  [place] - [facility]D   1/22/2024  8:45 AM MD Saleem Motta       Message sent to [address] [place] [address] to schedule appt with patient?   NO      Requested Prescriptions     Pending Prescriptions Disp Refills    benzonatate (TESSALON) 200 MG capsule [Pharmacy Med Name: BENZONATATE 200 MG CAPSULE] 21 capsule 0     Sig: TAKE 1 CAPSULE BY MOUTH 3 TIMES A DAY AS NEEDED FOR COUGH

## 2023-10-26 NOTE — TELEPHONE ENCOUNTER
CPT: [id number]  AUTH: [id number]  DATE RANGE: 10/27/23 TO 11/26/23  [facility]  LOCATION AND  AREA OF SX LUMBAR  NOTE: DOC SCANNED

## 2023-10-26 NOTE — TELEPHONE ENCOUNTER
[facility] called stating that pt only has nocturnal oxygen. They got a POC order for pt and pt will have to have testing to qualify for portable oxygen.

## 2023-10-30 ENCOUNTER — TELEPHONE (OUTPATIENT)
Dept: PULMONOLOGY | Age: 63
End: 2023-10-30

## 2023-10-30 ENCOUNTER — TELEPHONE (OUTPATIENT)
Dept: FAMILY MEDICINE CLINIC | Age: 63
End: 2023-10-30

## 2023-10-30 NOTE — TELEPHONE ENCOUNTER
Spoke with patient. He sees Cardiologist tomorrow.   He will call Dr. Katt Menendez office ti get clearance

## 2023-10-30 NOTE — TELEPHONE ENCOUNTER
Patient will need both pulmonology and cardiology clearance.     Please call patient and see if he has obtained these and if so please contact pulmonologist and cardiologist and obtain the clearance and lay on my desk so that we can see where I can put the patient    Patient can be scheduled on November 6 at 10:20 AM for a 40-minute preop appointment

## 2023-10-30 NOTE — TELEPHONE ENCOUNTER
Pt called stating that he's needing a pre-op for back surgery on 11/10 with Dr. Robin Perera at [facility]. No avail appts. Please advise.

## 2023-10-30 NOTE — TELEPHONE ENCOUNTER
Pt is having surgery nov 10th and need a pulm clearance to get the surgery can you addend his last ov note or does he have to be seen please advise.

## 2023-11-02 ENCOUNTER — OFFICE VISIT (OUTPATIENT)
Dept: PULMONOLOGY | Age: 63
End: 2023-11-02
Payer: COMMERCIAL

## 2023-11-02 VITALS
HEIGHT: 64 IN | HEART RATE: 92 BPM | DIASTOLIC BLOOD PRESSURE: 80 MMHG | OXYGEN SATURATION: 93 % | SYSTOLIC BLOOD PRESSURE: 144 MMHG | RESPIRATION RATE: 18 BRPM | WEIGHT: 308 LBS | BODY MASS INDEX: 52.58 KG/M2

## 2023-11-02 DIAGNOSIS — J44.9 MODERATE COPD (CHRONIC OBSTRUCTIVE PULMONARY DISEASE) (HCC): ICD-10-CM

## 2023-11-02 DIAGNOSIS — Z01.818 PREOPERATIVE CLEARANCE: Primary | ICD-10-CM

## 2023-11-02 DIAGNOSIS — G47.33 OSA (OBSTRUCTIVE SLEEP APNEA): ICD-10-CM

## 2023-11-02 PROCEDURE — 1036F TOBACCO NON-USER: CPT | Performed by: INTERNAL MEDICINE

## 2023-11-02 PROCEDURE — G8484 FLU IMMUNIZE NO ADMIN: HCPCS | Performed by: INTERNAL MEDICINE

## 2023-11-02 PROCEDURE — G8427 DOCREV CUR MEDS BY ELIG CLIN: HCPCS | Performed by: INTERNAL MEDICINE

## 2023-11-02 PROCEDURE — 99214 OFFICE O/P EST MOD 30 MIN: CPT | Performed by: INTERNAL MEDICINE

## 2023-11-02 PROCEDURE — G8417 CALC BMI ABV UP PARAM F/U: HCPCS | Performed by: INTERNAL MEDICINE

## 2023-11-02 PROCEDURE — 3023F SPIROM DOC REV: CPT | Performed by: INTERNAL MEDICINE

## 2023-11-02 PROCEDURE — 3017F COLORECTAL CA SCREEN DOC REV: CPT | Performed by: INTERNAL MEDICINE

## 2023-11-02 NOTE — PROGRESS NOTES
[facility] Pulmonary, Critical Care [facility] Sleep Specialists                                                                CHIEF COMPLAINT: follow up OANH/COPD/preoperative clearance       HPI:   Scheduled for back surgery 11/10/23   Doing pretty good  No cough or sputum  No hemoptysis   Feels pretty good  Patient is compliant with inhaled bronchodilators and O2. Uses Albuterol 0-2 times/day   Uses O2 at night- can not ware BiPAP.    O2 sat during the day remains > 90        Past Medical History:   Diagnosis Date    Acute diverticulitis 04/10/2021    Acute kidney injury ([address]) 04/08/2021    Acute on chronic respiratory failure with hypoxemia (HCC)     Acute respiratory failure ([address]) 08/19/2020    Acute respiratory failure with hypoxia ([address])     Anxiety 01/06/2020    Aortic valve calcification 09/2020    seen on lung CT    CAD in native artery 04/14/2021    Chronic obstructive pulmonary disease ([address]) 09/03/2019    PFT done 9/03/19    Chronic systolic congestive heart failure ([address]) 04/10/2021    Class 3 severe obesity with body mass index (BMI) of 45.0 to 49.9 in adult [facility])     Coronary artery calcification 09/2020    seen on lung ct    Coronary artery disease involving native heart with angina pectoris ([address]) 09/22/2020    Crush injury of right foot 07/23/2015    DDD (degenerative disc disease), lumbar 01/06/2020    Depression 01/06/2020    Diverticulitis of colon 04/10/2021    Erectile dysfunction 01/06/2020    Fatigue 01/06/2020    HNP (herniated nucleus pulposus), lumbar 01/06/2020    Hyperglycemia 01/06/2020    Hyperlipidemia     Hypersomnia 01/06/2020    Hypertension     Insomnia     Ischemic cardiomyopathy     Kidney stone     Lumbar radiculopathy 01/06/2020    Lumbar spine pain 01/06/2020    LVH (left ventricular hypertrophy)     seen on echo 8/2020, moderate    Mobitz type I Wenckebach atrioventricular block 04/10/2021    Moderate protein-calorie malnutrition ([address]) 03/17/2020

## 2023-11-03 ENCOUNTER — TELEPHONE (OUTPATIENT)
Dept: ORTHOPEDIC SURGERY | Age: 63
End: 2023-11-03

## 2023-11-03 RX ORDER — METFORMIN HYDROCHLORIDE 500 MG/1
TABLET, EXTENDED RELEASE ORAL
Qty: 60 TABLET | Refills: 2 | Status: SHIPPED | OUTPATIENT
Start: 2023-11-03

## 2023-11-03 NOTE — TELEPHONE ENCOUNTER
Surgery and/or Procedure Scheduling     Contact Name: Barrie Marroquin Request:   Patient Contact Number: [phone]      THE PT WOULD LIKE SOMEONE TO CALL HIM WITH THE TIME OF HIS SX AND HIS ARRIVAL TIME.

## 2023-11-06 ENCOUNTER — OFFICE VISIT (OUTPATIENT)
Dept: FAMILY MEDICINE CLINIC | Age: 63
End: 2023-11-06
Payer: COMMERCIAL

## 2023-11-06 VITALS
OXYGEN SATURATION: 93 % | HEART RATE: 88 BPM | WEIGHT: 290 LBS | BODY MASS INDEX: 49.51 KG/M2 | HEIGHT: 64 IN | DIASTOLIC BLOOD PRESSURE: 78 MMHG | SYSTOLIC BLOOD PRESSURE: 130 MMHG

## 2023-11-06 DIAGNOSIS — E11.649 UNCONTROLLED TYPE 2 DIABETES MELLITUS WITH HYPOGLYCEMIA WITHOUT COMA (HCC): ICD-10-CM

## 2023-11-06 DIAGNOSIS — I25.10 CORONARY ARTERY DISEASE INVOLVING NATIVE CORONARY ARTERY OF NATIVE HEART WITHOUT ANGINA PECTORIS: ICD-10-CM

## 2023-11-06 DIAGNOSIS — J44.9 CHRONIC OBSTRUCTIVE PULMONARY DISEASE, UNSPECIFIED COPD TYPE (HCC): ICD-10-CM

## 2023-11-06 DIAGNOSIS — Z01.818 PRE-OPERATIVE EXAMINATION: Primary | ICD-10-CM

## 2023-11-06 DIAGNOSIS — E78.2 MIXED HYPERLIPIDEMIA: ICD-10-CM

## 2023-11-06 DIAGNOSIS — E11.9 TYPE 2 DIABETES MELLITUS WITHOUT COMPLICATION, WITHOUT LONG-TERM CURRENT USE OF INSULIN (HCC): ICD-10-CM

## 2023-11-06 DIAGNOSIS — M51.26 HNP (HERNIATED NUCLEUS PULPOSUS), LUMBAR: ICD-10-CM

## 2023-11-06 DIAGNOSIS — I44.1 MOBITZ TYPE I WENCKEBACH ATRIOVENTRICULAR BLOCK: ICD-10-CM

## 2023-11-06 DIAGNOSIS — I50.42 CHRONIC COMBINED SYSTOLIC AND DIASTOLIC CONGESTIVE HEART FAILURE (HCC): ICD-10-CM

## 2023-11-06 DIAGNOSIS — Z95.1 S/P CABG X 3: ICD-10-CM

## 2023-11-06 DIAGNOSIS — M51.36 DDD (DEGENERATIVE DISC DISEASE), LUMBAR: ICD-10-CM

## 2023-11-06 DIAGNOSIS — M54.50 LUMBAR SPINE PAIN: ICD-10-CM

## 2023-11-06 DIAGNOSIS — I49.5 SSS (SICK SINUS SYNDROME) (HCC): ICD-10-CM

## 2023-11-06 DIAGNOSIS — E66.01 MORBID OBESITY WITH BMI OF 45.0-49.9, ADULT (HCC): ICD-10-CM

## 2023-11-06 DIAGNOSIS — Z95.0 PACEMAKER: ICD-10-CM

## 2023-11-06 DIAGNOSIS — G47.33 OSA (OBSTRUCTIVE SLEEP APNEA): ICD-10-CM

## 2023-11-06 LAB
BILIRUBIN, POC: ABNORMAL
BLOOD URINE, POC: ABNORMAL
CLARITY, POC: CLEAR
COLOR, POC: YELLOW
GLUCOSE URINE, POC: >=1000
KETONES, POC: ABNORMAL
LEUKOCYTE EST, POC: ABNORMAL
NITRITE, POC: ABNORMAL
PH, POC: 6.5
PROTEIN, POC: ABNORMAL
SPECIFIC GRAVITY, POC: 1.01
UROBILINOGEN, POC: 0.2

## 2023-11-06 PROCEDURE — 81002 URINALYSIS NONAUTO W/O SCOPE: CPT | Performed by: NURSE PRACTITIONER

## 2023-11-06 PROCEDURE — 2022F DILAT RTA XM EVC RTNOPTHY: CPT | Performed by: NURSE PRACTITIONER

## 2023-11-06 PROCEDURE — G8427 DOCREV CUR MEDS BY ELIG CLIN: HCPCS | Performed by: NURSE PRACTITIONER

## 2023-11-06 PROCEDURE — 3046F HEMOGLOBIN A1C LEVEL >9.0%: CPT | Performed by: NURSE PRACTITIONER

## 2023-11-06 PROCEDURE — G8484 FLU IMMUNIZE NO ADMIN: HCPCS | Performed by: NURSE PRACTITIONER

## 2023-11-06 PROCEDURE — 3017F COLORECTAL CA SCREEN DOC REV: CPT | Performed by: NURSE PRACTITIONER

## 2023-11-06 PROCEDURE — 1036F TOBACCO NON-USER: CPT | Performed by: NURSE PRACTITIONER

## 2023-11-06 PROCEDURE — G8417 CALC BMI ABV UP PARAM F/U: HCPCS | Performed by: NURSE PRACTITIONER

## 2023-11-06 PROCEDURE — 3023F SPIROM DOC REV: CPT | Performed by: NURSE PRACTITIONER

## 2023-11-06 PROCEDURE — 99214 OFFICE O/P EST MOD 30 MIN: CPT | Performed by: NURSE PRACTITIONER

## 2023-11-06 PROCEDURE — 36415 COLL VENOUS BLD VENIPUNCTURE: CPT | Performed by: NURSE PRACTITIONER

## 2023-11-06 NOTE — PROGRESS NOTES
[facility]. [facility]. Ellie Pelaez CNP                                                                     Preoperative Evaluation        Mica Honeycutt  YOB: 1960    Date of Service:  11/6/2023    Vitals:    11/06/23 1034   BP: 130/78   Site: Left Upper Arm   Position: Sitting   Cuff Size: Large Adult   Pulse: 88   SpO2: 93%   Weight: 131.5 kg (290 lb)   Height: 1.626 m (5' 4\")      Wt Readings from Last 2 Encounters:   11/06/23 131.5 kg (290 lb)   11/02/23 (!) 139.7 kg (308 lb)     BP Readings from Last 3 Encounters:   11/06/23 130/78   11/02/23 (!) 144/80   10/19/23 134/74        Chief Complaint   Patient presents with    Pre-op Exam     Microlumbar Laminectomy, Lumbar four to lumbar five, Lumbar five to sacral one. Scheduled 11/10/2023 with Dr. Demar Richard at [facility]      Allergies   Allergen Reactions    Dilaudid [Hydromorphone Hcl] Nausea And Vomiting    Codeine Itching     No outpatient medications have been marked as taking for the 11/6/23 encounter (Appointment) with ANICETO Fox CNP. This patient presents to the office today for a preoperative consultation at the request of surgeon, Dr. Francheska Chapman MD, who plans on performing [address] on November 10 at [facility].  The current problem began several months ago, and symptoms have been worsening with time. Conservative therapy: N/A.     Planned anesthesia: General   Known anesthesia problems: None   Bleeding risk: No recent or remote history of abnormal bleeding and Anticoagulant therapy- plavix and asa  Personal or FH of DVT/PE: No    Patient objection to receiving blood products: No    Patient Active Problem List   Diagnosis    Crush injury of right foot    LEE

## 2023-11-06 NOTE — PATIENT INSTRUCTIONS
Change in medication regimen before surgery: Take metoprolol, Trelegy and albuterol on morning of surgery with sip of water, and hold all other medications until after surgery, Discontinue ASA 7 days before surgery, Discontinue Plavix, fish oil, multivitamin and vitamin D 7 days before surgery

## 2023-11-07 DIAGNOSIS — D72.829 LEUKOCYTOSIS, UNSPECIFIED TYPE: Primary | ICD-10-CM

## 2023-11-07 LAB
ALBUMIN SERPL-MCNC: 4.3 G/DL (ref 3.4–5)
ALBUMIN/GLOB SERPL: 1.5 {RATIO} (ref 1.1–2.2)
ALP SERPL-CCNC: 100 U/L (ref 40–129)
ALT SERPL-CCNC: 25 U/L (ref 10–40)
ANION GAP SERPL CALCULATED.3IONS-SCNC: 12 MMOL/L (ref 3–16)
ANISOCYTOSIS BLD QL SMEAR: ABNORMAL
AST SERPL-CCNC: 20 U/L (ref 15–37)
BASOPHILS # BLD: 0 K/UL (ref 0–0.2)
BASOPHILS NFR BLD: 0 %
BILIRUB SERPL-MCNC: 0.7 MG/DL (ref 0–1)
BUN SERPL-MCNC: 17 MG/DL (ref 7–20)
CALCIUM SERPL-MCNC: 10.2 MG/DL (ref 8.3–10.6)
CHLORIDE SERPL-SCNC: 93 MMOL/L (ref 99–110)
CO2 SERPL-SCNC: 37 MMOL/L (ref 21–32)
CREAT SERPL-MCNC: 0.8 MG/DL (ref 0.8–1.3)
CREAT UR-MCNC: 158 MG/DL (ref 39–259)
DEPRECATED RDW RBC AUTO: 17 % (ref 12.4–15.4)
EOSINOPHIL # BLD: 0.5 K/UL (ref 0–0.6)
EOSINOPHIL NFR BLD: 4 %
EST. AVERAGE GLUCOSE BLD GHB EST-MCNC: 145.6 MG/DL
GFR SERPLBLD CREATININE-BSD FMLA CKD-EPI: >60 ML/MIN/{1.73_M2}
GLUCOSE SERPL-MCNC: 129 MG/DL (ref 70–99)
HBA1C MFR BLD: 6.7 %
HCT VFR BLD AUTO: 53.2 % (ref 40.5–52.5)
HGB BLD-MCNC: 17.2 G/DL (ref 13.5–17.5)
LYMPHOCYTES # BLD: 2.7 K/UL (ref 1–5.1)
LYMPHOCYTES NFR BLD: 17 %
MCH RBC QN AUTO: 27.6 PG (ref 26–34)
MCHC RBC AUTO-ENTMCNC: 32.3 G/DL (ref 31–36)
MCV RBC AUTO: 85.4 FL (ref 80–100)
MICROALBUMIN UR DL<=1MG/L-MCNC: <1.2 MG/DL
MICROALBUMIN/CREAT UR: NORMAL MG/G (ref 0–30)
MONOCYTES # BLD: 1 K/UL (ref 0–1.3)
MONOCYTES NFR BLD: 7 %
NEUTROPHILS # BLD: 9.4 K/UL (ref 1.7–7.7)
NEUTROPHILS NFR BLD: 60 %
NEUTS BAND NFR BLD MANUAL: 9 % (ref 0–7)
OVALOCYTES BLD QL SMEAR: ABNORMAL
PLATELET # BLD AUTO: 233 K/UL (ref 135–450)
PLATELET BLD QL SMEAR: ADEQUATE
PMV BLD AUTO: 9.1 FL (ref 5–10.5)
POIKILOCYTOSIS BLD QL SMEAR: ABNORMAL
POTASSIUM SERPL-SCNC: 3.8 MMOL/L (ref 3.5–5.1)
PROT SERPL-MCNC: 7.1 G/DL (ref 6.4–8.2)
RBC # BLD AUTO: 6.24 M/UL (ref 4.2–5.9)
SLIDE REVIEW: ABNORMAL
SODIUM SERPL-SCNC: 142 MMOL/L (ref 136–145)
VARIANT LYMPHS NFR BLD MANUAL: 3 % (ref 0–6)
WBC # BLD AUTO: 13.6 K/UL (ref 4–11)

## 2023-11-08 ENCOUNTER — NURSE ONLY (OUTPATIENT)
Dept: FAMILY MEDICINE CLINIC | Age: 63
End: 2023-11-08
Payer: COMMERCIAL

## 2023-11-08 DIAGNOSIS — D72.829 LEUKOCYTOSIS, UNSPECIFIED TYPE: ICD-10-CM

## 2023-11-08 LAB
ANISOCYTOSIS BLD QL SMEAR: ABNORMAL
BASOPHILS # BLD: 0.1 K/UL (ref 0–0.2)
BASOPHILS NFR BLD: 1 %
DEPRECATED RDW RBC AUTO: 16.5 % (ref 12.4–15.4)
EOSINOPHIL # BLD: 0.3 K/UL (ref 0–0.6)
EOSINOPHIL NFR BLD: 2 %
HCT VFR BLD AUTO: 51.3 % (ref 40.5–52.5)
HGB BLD-MCNC: 16.7 G/DL (ref 13.5–17.5)
LYMPHOCYTES # BLD: 2 K/UL (ref 1–5.1)
LYMPHOCYTES NFR BLD: 14 %
MCH RBC QN AUTO: 28 PG (ref 26–34)
MCHC RBC AUTO-ENTMCNC: 32.6 G/DL (ref 31–36)
MCV RBC AUTO: 85.9 FL (ref 80–100)
MONOCYTES # BLD: 1.2 K/UL (ref 0–1.3)
MONOCYTES NFR BLD: 9 %
NEUTROPHILS # BLD: 9.8 K/UL (ref 1.7–7.7)
NEUTROPHILS NFR BLD: 68 %
NEUTS BAND NFR BLD MANUAL: 5 % (ref 0–7)
NEUTS VAC BLD QL SMEAR: PRESENT
OVALOCYTES BLD QL SMEAR: ABNORMAL
PLATELET # BLD AUTO: 244 K/UL (ref 135–450)
PLATELET BLD QL SMEAR: ADEQUATE
PMV BLD AUTO: 9.2 FL (ref 5–10.5)
POLYCHROMASIA BLD QL SMEAR: ABNORMAL
RBC # BLD AUTO: 5.97 M/UL (ref 4.2–5.9)
SLIDE REVIEW: ABNORMAL
TOXIC GRANULES BLD QL SMEAR: PRESENT
VARIANT LYMPHS NFR BLD MANUAL: 1 % (ref 0–6)
WBC # BLD AUTO: 13.4 K/UL (ref 4–11)

## 2023-11-08 PROCEDURE — 36415 COLL VENOUS BLD VENIPUNCTURE: CPT | Performed by: NURSE PRACTITIONER

## 2023-11-09 ENCOUNTER — HOSPITAL ENCOUNTER (OUTPATIENT)
Age: 63
Discharge: HOME | End: 2023-11-09
Payer: COMMERCIAL

## 2023-11-09 DIAGNOSIS — D72.828 OTHER ELEVATED WHITE BLOOD CELL (WBC) COUNT: Primary | ICD-10-CM

## 2023-11-09 LAB — ERYTHROCYTE [SEDIMENTATION RATE] IN BLOOD BY WESTERGREN METHOD: 26 MM/HR (ref 0–20)

## 2023-11-09 PROCEDURE — 85652 RBC SED RATE AUTOMATED: CPT

## 2023-11-09 PROCEDURE — 36415 COLL VENOUS BLD VENIPUNCTURE: CPT

## 2023-11-16 ENCOUNTER — OFFICE VISIT (OUTPATIENT)
Dept: ORTHOPEDIC SURGERY | Age: 63
End: 2023-11-16
Payer: COMMERCIAL

## 2023-11-16 VITALS — HEIGHT: 64 IN | BODY MASS INDEX: 49.49 KG/M2 | WEIGHT: 289.9 LBS

## 2023-11-16 DIAGNOSIS — M48.062 SPINAL STENOSIS OF LUMBAR REGION WITH NEUROGENIC CLAUDICATION: Primary | ICD-10-CM

## 2023-11-16 PROCEDURE — G8427 DOCREV CUR MEDS BY ELIG CLIN: HCPCS | Performed by: ORTHOPAEDIC SURGERY

## 2023-11-16 PROCEDURE — G8484 FLU IMMUNIZE NO ADMIN: HCPCS | Performed by: ORTHOPAEDIC SURGERY

## 2023-11-16 PROCEDURE — G8417 CALC BMI ABV UP PARAM F/U: HCPCS | Performed by: ORTHOPAEDIC SURGERY

## 2023-11-16 PROCEDURE — 3017F COLORECTAL CA SCREEN DOC REV: CPT | Performed by: ORTHOPAEDIC SURGERY

## 2023-11-16 PROCEDURE — 99212 OFFICE O/P EST SF 10 MIN: CPT | Performed by: ORTHOPAEDIC SURGERY

## 2023-11-16 PROCEDURE — 1036F TOBACCO NON-USER: CPT | Performed by: ORTHOPAEDIC SURGERY

## 2023-11-16 NOTE — PROGRESS NOTES
New Patient: LUMBAR SPINE    Referring Provider:  No ref. provider found    CHIEF COMPLAINT:    Chief Complaint   Patient presents with    Surgical Consult     Surgery Cancelled 11.10.23       HISTORY OF PRESENT ILLNESS:     Mr. Enrique Johnson  is a pleasant 66 y.o. male here for consultation regarding his LBP and bilateral leg pain. He states his pain began insidiously several months ago. His pain has steadily increased since then. He rates his back pain 7/10 and leg pain 7/10. He describes the pain as aching. Pain is worse with standing and walking and improved some with sitting in a recliner. The leg pain radiates down the anterior aspect of his leg to his feet. He notes intermittent numbness and tingling in his legs and feet. He denies weakness of his legs and denies saddle numbness or bowel or bladder dysfunction. The pain moderately disrupts his sleep. He has been using a cane for 3 months. He is currently in cardiac rehab.      Current/Past Treatment:   Physical Therapy: Cardiac rehab  Chiropractic:  No   Injection:  GABRIEL previously with 2 years relief   Medications:  Cymbalta , lyricA    Past Medical History:   Past Medical History:   Diagnosis Date    Acute diverticulitis 04/10/2021    Acute kidney injury ([address]) 04/08/2021    Acute on chronic respiratory failure with hypoxemia (HCC)     Acute respiratory failure ([address]) 08/19/2020    Anxiety 01/06/2020    Aortic valve calcification 09/2020    seen on lung CT    CAD in native artery 04/14/2021    Chronic obstructive pulmonary disease ([address]) 09/03/2019    uses O2 2L prn during the day & 4L at night    Chronic systolic congestive heart failure ([address]) 04/10/2021    HfrEF    Class 3 severe obesity with body mass index (BMI) of 45.0 to 49.9 in adult [facility])     Coronary artery calcification 09/2020    seen on lung ct    Coronary artery disease involving native heart with angina pectoris ([address]) 09/22/2020    Crush injury of right foot 07/23/2015    DDD (degenerative disc

## 2023-11-26 DIAGNOSIS — E55.9 VITAMIN D DEFICIENCY: ICD-10-CM

## 2023-11-27 RX ORDER — METHOCARBAMOL 750 MG/1
TABLET ORAL
Qty: 4 CAPSULE | Refills: 2 | Status: SHIPPED | OUTPATIENT
Start: 2023-11-27

## 2023-11-27 RX ORDER — BENZONATATE 200 MG/1
CAPSULE ORAL
Qty: 21 CAPSULE | Refills: 0 | Status: SHIPPED | OUTPATIENT
Start: 2023-11-27

## 2023-11-27 NOTE — TELEPHONE ENCOUNTER
Refill Request     CONFIRM preferrred pharmacy with the patient. If Mail Order Rx - Pend for 90 day refill. Last Seen: Last Seen Department: 11/6/2023  Last Seen by PCP: Visit date not found    Last Written: 8/7/23- do not see a recent vit D level rechecked    If no future appointment scheduled, route STAFF MESSAGE with patient name to the [facility] for scheduling. Next Appointment:   Future Appointments   Date Time Provider [facility] [address]   1/17/2024 10:00 AM [facility] [place] [facility]   1/22/2024  8:45 AM MD Sola Flores MMA       Message sent to [address] [place] [address] to schedule appt with patient?   N/A      Requested Prescriptions     Pending Prescriptions Disp Refills    vitamin D (D3-50) 95236 UNIT CAPS [Pharmacy Med Name: VITAMIN D3-50 (CHOLECAL) 50,000U CP] 4 capsule 2     Sig: TAKE 1 CAPSULE BY MOUTH ONCE WEEKLY

## 2023-11-27 NOTE — TELEPHONE ENCOUNTER
Refill Request     CONFIRM preferrred pharmacy with the patient. If Mail Order Rx - Pend for 90 day refill. Last Seen: Last Seen Department: 11/6/2023  Last Seen by PCP: 11/6/2023    Last Written: 10/26/2023    If no future appointment scheduled, route STAFF MESSAGE with patient name to the [facility] for scheduling. Next Appointment:   Future Appointments   Date Time Provider [address] [place] [address]   1/17/2024 10:00 AM Piper Louis, APRN - CNP [place]  [place] - DYAB[place]JIT   1/22/2024  8:45 AM MD Kobi Zelaya MMA       Message sent to [address] [place] [address] to schedule appt with patient?   NO      Requested Prescriptions     Pending Prescriptions Disp Refills    benzonatate (TESSALON) 200 MG capsule [Pharmacy Med Name: BENZONATATE 200 MG CAPSULE] 21 capsule 0     Sig: TAKE 1 CAPSULE BY MOUTH 3 TIMES A DAY AS NEEDED FOR COUGH

## 2023-11-28 DIAGNOSIS — E11.649 UNCONTROLLED TYPE 2 DIABETES MELLITUS WITH HYPOGLYCEMIA WITHOUT COMA (HCC): Primary | ICD-10-CM

## 2023-11-28 NOTE — TELEPHONE ENCOUNTER
Refill Request     CONFIRM preferrred pharmacy with the patient. If Mail Order Rx - Pend for 90 day refill. Last Seen: Last Seen Department: 11/6/2023  Last Seen by PCP: 11/6/2023    Last Written:     If no future appointment scheduled, route STAFF MESSAGE with patient name to the [facility] for scheduling. Next Appointment:   Future Appointments   Date Time Provider [address]   1/17/2024 10:00 AM Alexandra Noble, [facility] [place] [facility]   1/22/2024  8:45 AM MD Nitin Hu MMA       Message sent to [facility] to schedule appt with patient?   N/A      Requested Prescriptions     Pending Prescriptions Disp Refills    Continuous Blood Gluc Sensor (FREESTYLE ELMO 2 SENSOR) MISC [Pharmacy Med Name: FREESTYLE ELMO 2 SENSOR]       Sig: USE ONE SENSOR AS DIRECTED AND CHANGE EVERY 14 DAYS

## 2023-12-11 RX ORDER — BENZONATATE 200 MG/1
CAPSULE ORAL
Qty: 21 CAPSULE | Refills: 0 | OUTPATIENT
Start: 2023-12-11

## 2024-01-04 ENCOUNTER — OFFICE VISIT (OUTPATIENT)
Dept: ORTHOPEDIC SURGERY | Age: 64
End: 2024-01-04
Payer: MEDICARE

## 2024-01-04 VITALS — HEIGHT: 64 IN | WEIGHT: 289.9 LBS | BODY MASS INDEX: 49.49 KG/M2

## 2024-01-04 DIAGNOSIS — M48.062 LUMBAR STENOSIS WITH NEUROGENIC CLAUDICATION: Primary | ICD-10-CM

## 2024-01-04 PROCEDURE — G8427 DOCREV CUR MEDS BY ELIG CLIN: HCPCS | Performed by: ORTHOPAEDIC SURGERY

## 2024-01-04 PROCEDURE — 3017F COLORECTAL CA SCREEN DOC REV: CPT | Performed by: ORTHOPAEDIC SURGERY

## 2024-01-04 PROCEDURE — 1036F TOBACCO NON-USER: CPT | Performed by: ORTHOPAEDIC SURGERY

## 2024-01-04 PROCEDURE — 99213 OFFICE O/P EST LOW 20 MIN: CPT | Performed by: ORTHOPAEDIC SURGERY

## 2024-01-04 PROCEDURE — G8417 CALC BMI ABV UP PARAM F/U: HCPCS | Performed by: ORTHOPAEDIC SURGERY

## 2024-01-04 PROCEDURE — G8484 FLU IMMUNIZE NO ADMIN: HCPCS | Performed by: ORTHOPAEDIC SURGERY

## 2024-01-04 NOTE — PROGRESS NOTES
New Patient: LUMBAR SPINE    Referring Provider:  No ref. provider found    CHIEF COMPLAINT:    Chief Complaint   Patient presents with    Follow-up     BL LE EMG Review       HISTORY OF PRESENT ILLNESS:     Mr. Isaac Delgado  is a pleasant 63 y.o. male here for consultation regarding his LBP and bilateral leg pain. He states his pain began insidiously several months ago. His pain has steadily increased since then. He rates his back pain 7/10 and leg pain 7/10. He describes the pain as aching. Pain is worse with standing and walking and improved some with sitting in a recliner.  That said he continues to have severe pain in his back and legs with sitting and lying down.. The leg pain radiates down the anterior aspect of his leg to his feet. He notes intermittent numbness and tingling in his legs and feet.  He denies weakness of his legs and denies saddle numbness or bowel or bladder dysfunction.  The pain moderately disrupts his sleep. He has been using a cane for 3 months. He is currently in cardiac rehab.     Current/Past Treatment:   Physical Therapy: Cardiac rehab  Chiropractic:  No   Injection:  GABRIEL previously with 2 years relief   Medications:  Cymbalta , lyricA    Past Medical History:   Past Medical History:   Diagnosis Date    Acute diverticulitis 04/10/2021    Acute kidney injury (HCC) 04/08/2021    Acute on chronic respiratory failure with hypoxemia ([facility])     Acute respiratory failure (HCC) 08/19/2020    Anxiety 01/06/2020    Aortic valve calcification 09/2020    seen on lung CT    CAD in native artery 04/14/2021    Chronic obstructive pulmonary disease (HCC) 09/03/2019    uses O2 2L prn during the day & 4L at night    Chronic systolic congestive heart failure (HCC) 04/10/2021    HfrEF    Class 3 severe obesity with body mass index (BMI) of 45.0 to 49.9 in adult ([facility])     Coronary artery calcification 09/2020    seen on lung ct    Coronary artery disease involving native heart with angina pectoris ([facility])

## 2024-01-22 ENCOUNTER — OFFICE VISIT (OUTPATIENT)
Dept: PULMONOLOGY | Age: 64
End: 2024-01-22
Payer: MEDICARE

## 2024-01-22 ENCOUNTER — TELEPHONE (OUTPATIENT)
Dept: PULMONOLOGY | Age: 64
End: 2024-01-22

## 2024-01-22 ENCOUNTER — TELEPHONE (OUTPATIENT)
Dept: FAMILY MEDICINE CLINIC | Age: 64
End: 2024-01-22

## 2024-01-22 VITALS
HEIGHT: 64 IN | OXYGEN SATURATION: 90 % | RESPIRATION RATE: 20 BRPM | SYSTOLIC BLOOD PRESSURE: 122 MMHG | DIASTOLIC BLOOD PRESSURE: 60 MMHG | WEIGHT: 304.6 LBS | HEART RATE: 100 BPM | BODY MASS INDEX: 52 KG/M2

## 2024-01-22 DIAGNOSIS — I27.20 PULMONARY HYPERTENSION (HCC): ICD-10-CM

## 2024-01-22 DIAGNOSIS — Z87.891 PERSONAL HISTORY OF TOBACCO USE: ICD-10-CM

## 2024-01-22 DIAGNOSIS — J44.1 COPD EXACERBATION (HCC): Primary | ICD-10-CM

## 2024-01-22 DIAGNOSIS — G47.33 OSA (OBSTRUCTIVE SLEEP APNEA): ICD-10-CM

## 2024-01-22 PROBLEM — G20.A1 PARKINSON'S DISEASE: Status: ACTIVE | Noted: 2024-01-22

## 2024-01-22 PROCEDURE — G0296 VISIT TO DETERM LDCT ELIG: HCPCS | Performed by: INTERNAL MEDICINE

## 2024-01-22 PROCEDURE — 3023F SPIROM DOC REV: CPT | Performed by: INTERNAL MEDICINE

## 2024-01-22 PROCEDURE — G8484 FLU IMMUNIZE NO ADMIN: HCPCS | Performed by: INTERNAL MEDICINE

## 2024-01-22 PROCEDURE — 99214 OFFICE O/P EST MOD 30 MIN: CPT | Performed by: INTERNAL MEDICINE

## 2024-01-22 PROCEDURE — G8427 DOCREV CUR MEDS BY ELIG CLIN: HCPCS | Performed by: INTERNAL MEDICINE

## 2024-01-22 PROCEDURE — G8417 CALC BMI ABV UP PARAM F/U: HCPCS | Performed by: INTERNAL MEDICINE

## 2024-01-22 PROCEDURE — 1036F TOBACCO NON-USER: CPT | Performed by: INTERNAL MEDICINE

## 2024-01-22 PROCEDURE — 3017F COLORECTAL CA SCREEN DOC REV: CPT | Performed by: INTERNAL MEDICINE

## 2024-01-22 RX ORDER — DOXYCYCLINE HYCLATE 100 MG
100 TABLET ORAL 2 TIMES DAILY
Qty: 10 TABLET | Refills: 0 | Status: SHIPPED | OUTPATIENT
Start: 2024-01-22 | End: 2024-01-27

## 2024-01-22 RX ORDER — PREDNISONE 20 MG/1
20 TABLET ORAL DAILY
Qty: 5 TABLET | Refills: 0 | Status: SHIPPED | OUTPATIENT
Start: 2024-01-22 | End: 2024-01-27

## 2024-01-22 NOTE — PROGRESS NOTES
Discussed with the patient the current USPSTF guidelines released March 9, 2021 for screening for lung cancer.    For adults aged 50 to 80 years who have a 20 pack-year smoking history and currently smoke or have quit within the past 15 years the grade B recommendation is to:  Screen for lung cancer with low-dose computed tomography (LDCT) every year.  Stop screening once a person has not smoked for 15 years or has a health problem that limits life expectancy or the ability to have lung surgery.    The patient  reports that he quit smoking about 3 years ago. His smoking use included cigarettes. He started smoking about 38 years ago. He has a 70.0 pack-year smoking history. He has been exposed to tobacco smoke. He has never used smokeless tobacco.. Discussed with patient the risks and benefits of screening, including over-diagnosis, false positive rate, and total radiation exposure.  The patient currently exhibits no signs or symptoms suggestive of lung cancer.  Discussed with patient the importance of compliance with yearly annual lung cancer screenings and willingness to undergo diagnosis and treatment if screening scan is positive.  In addition, the patient was counseled regarding the importance of remaining smoke free and/or total smoking cessation.    Also reviewed the following if the patient has Medicare that as of February 10, 2022, Medicare only covers LDCT screening in patients aged 50-77 with at least a 20 pack-year smoking history who currently smoke or have quit in the last 15 years  
01/06/2020    OANH (obstructive sleep apnea) 01/06/2020    CPAP - unable to tolerate    Pacemaker     dual chamber Medtronic    Pneumonia, primary atypical     Reactive depression 01/06/2020    S/P three vessel coronary artery bypass 10/26/2020    Spondylosis without myelopathy or radiculopathy, lumbar region 01/06/2020    Tobacco abuse 01/06/2020    Wears dentures     full set       Past Surgical History:        Procedure Laterality Date    ANKLE SURGERY Left 08/02/2021    LEFT FOOT SCREW REMOVAL performed by Fernanda Macias MD at [facility] OR    CHOLECYSTECTOMY      COLONOSCOPY  06/29/2023    COLONOSCOPY N/A 06/29/2023    COLON W/ANES. performed by Darrius Sadler MD at [facility] ENDOSCOPY    CORONARY ANGIOPLASTY WITH STENT PLACEMENT  03/2021    CORONARY ARTERY BYPASS GRAFT N/A 09/25/2020    CORONARY ARTERY BYPASS GRAFTING X3, INTERNAL MAMMARY ARTERY, SAPHENOUS VEIN GRAFT, ON PUMP, STERNAL PLATING, 5 LEVEL BILATERAL INTERCOSTAL NERVE BLOCK, PLATELET GEL APPLICATION performed by Rakan Nix MD at [facility] CVOR    CYSTOSCOPY  03/16/2011    cystoscopy left ureteroscopy, left retrograde, double J stent placement    ESOPHAGOGASTRODUODENOSCOPY  06/29/2023    FINGER AMPUTATION Right 02/02/2021    RIGHT MIDDLE FINGER IRRIGATION AND DEBRIDEMENT WITH REVISION AMPUTATION AND BONE SHORTENING, POSSIBLE NAIL ABLATION performed by Chris Stephens MD at [facility] OR    OTHER SURGICAL HISTORY      Matrixectomy-NaOH/Phenol    PACEMAKER PLACEMENT      PAIN MANAGEMENT PROCEDURE Right 12/24/2019    RIGHT LUMBAR FIVE SACRAL ONE TRANSFORAMINAL EPIDURAL STEROID INJECTION SITE CONFIRMED BY FLUOROSCOPY performed by Socorro Simms MD at [facility] OR    PAIN MANAGEMENT PROCEDURE Right 01/07/2020    RIGHT LUMBAR FOUR AND LUMBAR FIVE TRANSFORAMINAL EPIDURAL STEROID INJECTION SITE CONFIRMED BY FLUOROSCOPY performed by Socorro Simms MD at [facility] OR    UPPER GASTROINTESTINAL ENDOSCOPY N/A 06/29/2023    EGD BIOPSY performed by Darrius

## 2024-01-22 NOTE — TELEPHONE ENCOUNTER
Patient saw Dr. Mcclendon today and is having trouble with his oxygen.  It was bumped up to 5, but when he gets up in the middle of the night he feels like he is suffocating.  Dr Mcclendon recommended a hospital bed and instructed to call PCP.  Patient is calling asking for orders to be sent to [facility] for a hospital bed.

## 2024-01-23 NOTE — TELEPHONE ENCOUNTER
Patient will need a 40 minute in office face to face evaluation to see if he qualifies for a hospital bed

## 2024-01-30 DIAGNOSIS — F32.9 REACTIVE DEPRESSION: ICD-10-CM

## 2024-01-30 RX ORDER — BENZONATATE 200 MG/1
CAPSULE ORAL
Qty: 21 CAPSULE | Refills: 0 | Status: SHIPPED | OUTPATIENT
Start: 2024-01-30

## 2024-01-30 RX ORDER — AMITRIPTYLINE HYDROCHLORIDE 25 MG/1
25 TABLET, FILM COATED ORAL NIGHTLY
Qty: 30 TABLET | Refills: 2 | Status: SHIPPED | OUTPATIENT
Start: 2024-01-30

## 2024-01-30 NOTE — TELEPHONE ENCOUNTER
Refill Request     CONFIRM preferrred pharmacy with the patient.    If Mail Order Rx - Pend for 90 day refill.      Last Seen: Last Seen Department: 11/6/2023  Last Seen by PCP: 11/6/2023    Last Written: 11/27/23    If no future appointment scheduled, route STAFF MESSAGE with patient name to the  Pool for scheduling.      Next Appointment:   Future Appointments   Date Time Provider Department Center   2/1/2024 12:20 PM Genaro Turpin MD [facility]   2/14/2024 10:30 AM Genaro Turpin MD [facility]   2/22/2024  1:00 PM Florecita Chavez, APRN - CNP [facility] [facility] [place] - [facility]   10/22/2024  9:00 AM MMO CT RM 1 [facility]   10/28/2024 11:00 AM Grey Mcclendon MD [facility]       Message sent to  to schedule appt with patient?  N/A      Requested Prescriptions     Pending Prescriptions Disp Refills    benzonatate (TESSALON) 200 MG capsule [Pharmacy Med Name: BENZONATATE 200 MG CAPSULE] 21 capsule 0     Sig: TAKE 1 CAPSULE BY MOUTH 3 TIMES A DAY AS NEEDED FOR COUGH    amitriptyline (ELAVIL) 25 MG tablet [Pharmacy Med Name: AMITRIPTYLINE HCL 25 MG TAB] 30 tablet 5     Sig: TAKE ONE TABLET BY MOUTH ONCE NIGHTLY

## 2024-01-31 DIAGNOSIS — I50.42 CHRONIC COMBINED SYSTOLIC AND DIASTOLIC CONGESTIVE HEART FAILURE (HCC): ICD-10-CM

## 2024-01-31 DIAGNOSIS — R60.9 EDEMA, UNSPECIFIED TYPE: ICD-10-CM

## 2024-01-31 NOTE — TELEPHONE ENCOUNTER
Last OV-1/5/2023  No upcoming OV    Labs-   BMP:8/31/2023      FRONT- pt needs yearly appt to obtain medication refills

## 2024-01-31 NOTE — TELEPHONE ENCOUNTER
1/31- called pt @[phone]. Pt is now scheduled 3/12/24 alli peterson/[facility] for annual f/u. Pt stated that he does follow with another cardiologist named Dr. Patel at [facility] and that he will reach out to him to see if he is able to take over future refills of metolazone. Pt stated he's not sure why he needs to continue to be seen by [facility] but will if needed.

## 2024-02-01 ENCOUNTER — HOSPITAL ENCOUNTER (OUTPATIENT)
Age: 64
Setting detail: OUTPATIENT SURGERY
Discharge: HOME | End: 2024-02-01
Attending: ANESTHESIOLOGY | Admitting: ANESTHESIOLOGY
Payer: MEDICARE

## 2024-02-01 ENCOUNTER — TELEPHONE (OUTPATIENT)
Dept: FAMILY MEDICINE CLINIC | Age: 64
End: 2024-02-01

## 2024-02-01 VITALS
HEIGHT: 64 IN | HEART RATE: 82 BPM | SYSTOLIC BLOOD PRESSURE: 135 MMHG | RESPIRATION RATE: 16 BRPM | WEIGHT: 284 LBS | TEMPERATURE: 98 F | DIASTOLIC BLOOD PRESSURE: 84 MMHG | OXYGEN SATURATION: 92 % | BODY MASS INDEX: 48.49 KG/M2

## 2024-02-01 LAB
GLUCOSE BLD-MCNC: 131 MG/DL (ref 70–99)
PERFORMED ON: ABNORMAL

## 2024-02-01 PROCEDURE — 64483 NJX AA&/STRD TFRM EPI L/S 1: CPT | Performed by: ANESTHESIOLOGY

## 2024-02-01 PROCEDURE — 6360000002 HC RX W HCPCS: Performed by: ANESTHESIOLOGY

## 2024-02-01 PROCEDURE — 6360000004 HC RX CONTRAST MEDICATION: Performed by: ANESTHESIOLOGY

## 2024-02-01 PROCEDURE — 2500000003 HC RX 250 WO HCPCS: Performed by: ANESTHESIOLOGY

## 2024-02-01 PROCEDURE — 7100000010 HC PHASE II RECOVERY - FIRST 15 MIN: Performed by: ANESTHESIOLOGY

## 2024-02-01 PROCEDURE — 3600000002 HC SURGERY LEVEL 2 BASE: Performed by: ANESTHESIOLOGY

## 2024-02-01 RX ORDER — BETAMETHASONE SODIUM PHOSPHATE AND BETAMETHASONE ACETATE 3; 3 MG/ML; MG/ML
INJECTION, SUSPENSION INTRA-ARTICULAR; INTRALESIONAL; INTRAMUSCULAR; SOFT TISSUE
Status: DISCONTINUED
Start: 2024-02-01 | End: 2024-02-01 | Stop reason: HOSPADM

## 2024-02-01 ASSESSMENT — PAIN - FUNCTIONAL ASSESSMENT
PAIN_FUNCTIONAL_ASSESSMENT: 0-10
PAIN_FUNCTIONAL_ASSESSMENT: PREVENTS OR INTERFERES WITH MANY ACTIVE NOT PASSIVE ACTIVITIES

## 2024-02-01 ASSESSMENT — PAIN DESCRIPTION - DESCRIPTORS: DESCRIPTORS: SHARP

## 2024-02-01 NOTE — PROCEDURES
Procedure: Bilateral L5-S1 Lumbar transforaminal epidural steroid injection under fluoroscopic guidance  Lumbar transforaminal cortisone inj w/ fluoro  48741      Diagnosis: Lumbar radiculopathy M54.16    Medical Necessity:  The patient has radicular type pain that travels down their affected extremity.  This pain we are treating stems from radicular pain/radiculopathy as documented by axial location with radiating pain to extremity associated with numbness/tingling and/or weakness resulting in radiculopathy. The levels selected today was based on the patient's history, exam, and radiological findings.      The patient has failed to respond/symptoms have not improved to less invasive options including conservative therapy and oral medication (OTC medications, NSAIDs), activity modification, home exercise regimen, lifestyle changes, and treatment by other medical practitioners  The patient suffers from chronic pain that is causing psychological, social, and physical impairment  History and Physical exam and Radiological findings are consistent with the listed diagnosis and confirm the pathology which requires intervention  We are attempting this interventional technique as a method to treat the pain/achieve pain reduction and achieve functional improvement/improvement in ADLs, while minimizing the need for any/excessive oral medications or addicting pain killers  The patient has filled out a pain diary to assess the perceived functional improvement and VAS pain scores through the intervention phase     Procedure:  Informed Consent  Informed consent was obtained after the risks and benefits of the procedure were discussed in detail with the patient; to include but not limited to infection and or bleeding at the injection site, soreness at the injection site, nerve injury, incomplete pain control, worsening of pain, and headache.  All questions were answered and treatment options discussed.  Patient agreed to continue

## 2024-02-01 NOTE — PROGRESS NOTES
Pt arrived to PACU,A&O,no c/o pain and/or numbness or tingling, vitals stable, site unremarkable

## 2024-02-01 NOTE — H&P
Nursing History and Physical reviewed and agreed upon.      Additional findings:    Allergies and medications have been reviewed.    HENT: Airway patent and reviewed  Cardiovascular: Normal rate, regular rhythm, normal heart sounds.   Pulmonary/Chest: No wheezes. No rhonchi. No rales.   Abdominal: Soft. Bowel sounds are normal. No distension.    Mallampati: 2    ASA CLASS:         []   I. Normal, healthy adult           [x]   II.  Mild systemic disease            []   III.  Severe systemic disease      Sedation plan:   [x]  Local/MINIMAL     []  Minimal    MALLAMPATI:           []   I.   Complete visualization of the soft palate           [x]   II.  Complete visualization of the uvula            []   III.  Visualization of only the base of the uvula           []   IV. Soft palate is not visibl    Patient's condition acceptable for planned procedure/sedation.   Post Procedure Plan   Return to same level of care   ______________________     The risks and benefits as well as alternatives to the procedure have been discussed with the patient and or family.  The patient and or next of kin understands and agrees to proceed.    Genaro Turpin MD

## 2024-02-01 NOTE — TELEPHONE ENCOUNTER
Attempted to call patient.  Both numbers in Epic are shut off.  Will send patient a my chart message to call office

## 2024-02-01 NOTE — PROGRESS NOTES
Discharge instructions given to pt and verbalized understanding, states pain is at a tolerable level, no numbness or weakness noted, pt ambulated out to car without complications

## 2024-02-01 NOTE — TELEPHONE ENCOUNTER
Please see my telephone message from 1/22/2024 that states the following  .  Patient will need a 40 minute in office face to face evaluation to see if he qualifies for a hospital bed

## 2024-02-01 NOTE — DISCHARGE INSTRUCTIONS
[facility]    [phone]    Post Pain Management Injection    PATIENT INSTRUCTIONS:     -Resume Normal Diet  -Other    ACTIVITY:    -No driving or operating machinery for 8 hours post procedure without sedation and 24 hours if you had sedation.  If you are seen driving during this time the proper authorities will be notified.  -Do not stay alone for 4-6 hours after the procedure.  -If you have had IV sedation, do not sign legal documents, make any major decisions, or be involved in work decisions for the remainder of the day.  -May shower or bathe.  -Resume normal activity when full movement/sensation has returned in extremities.           3)  SITE CARE:    -Observe puncture site for signs of infection (redness, warmth swelling, drainage with a foul odor, fever or increased tenderness).           4)  EXPECTED SIDE EFFECTS:    -Numbness/tingling/weakness in extremities, if this lasts more than 6 hours notify Dr. Turpin.  -Muscle stiffness, soreness at puncture site (soreness may last 2-4 days).          DIABETIC PATIENTS ONLY:    -Increased glucose levels in all diabetic patients who have received a steroid injection.  -Monitor blood sugars frequently for the first 5 days following procedure.      -Adjust medication accordingly.           5)  TO REACH DR. TURPIN: Call [phone]    6)   ADDITIONAL INSTRUCTIONS:    Follow-up as scheduled or call for appointment if not already done.  Patients taking blood thinners may resume as prescribed. Coumadin can be restarted this evening, all other blood thinners will be resumed tomorrow.

## 2024-02-01 NOTE — TELEPHONE ENCOUNTER
Pt would like to know if we have sent anything in for the hospital bed. Pulm told him it has to go through us. Please advise.

## 2024-02-05 RX ORDER — METOLAZONE 5 MG/1
5 TABLET ORAL WEEKLY
Qty: 10 TABLET | Refills: 0 | Status: SHIPPED | OUTPATIENT
Start: 2024-02-05

## 2024-02-05 NOTE — TELEPHONE ENCOUNTER
Last OV-1/5/2023  Upcoming OV:3/12/2024    Labs-   BMP:8/31/2023        Awaiting to hear back from pt if his cardiologist at [facility] is going to take over refills. Short term supply pended

## 2024-02-12 RX ORDER — METFORMIN HYDROCHLORIDE 500 MG/1
TABLET, EXTENDED RELEASE ORAL
Qty: 180 TABLET | Refills: 0 | Status: SHIPPED | OUTPATIENT
Start: 2024-02-12

## 2024-02-12 NOTE — TELEPHONE ENCOUNTER
Refill Request     CONFIRM preferrred pharmacy with the patient.    If Mail Order Rx - Pend for 90 day refill.      Last Seen: Last Seen Department: 11/6/2023  Last Seen by PCP: 11/6/2023    Last Written: 11/3/2023    If no future appointment scheduled, route STAFF MESSAGE with patient name to the  Pool for scheduling.      Next Appointment:   Future Appointments   Date Time Provider Department Center   2/14/2024 10:30 AM Genaro Turpin MD [facility]   2/22/2024  1:00 PM [facility]avez [facility] [place] [facility]   3/18/2024 10:00 AM [facility]cita [facility]avez [facility] [place] [facility]   10/22/2024  9:00 AM [facility]   10/28/2024 11:00 AM Grey Mcclendon MD [facility]       Message sent to  to schedule appt with patient?  NO      Requested Prescriptions     Pending Prescriptions Disp Refills    metFORMIN (GLUCOPHAGE-XR) 500 MG extended release tablet [Pharmacy Med Name: METFORMIN HCL  MG TABLET] 180 tablet      Sig: TAKE 2 TABLETS BY MOUTH DAILY WITH BREAKFAST

## 2024-02-20 NOTE — PROGRESS NOTES
[facility]YSICIAN PRACTICES  [facility]  [address] [place] [address]  Dept: [phone]  Dept Fax: [phone]  Loc: [phone]    Isaac Delgado is a 63 y.o. male who presents today for his medical conditions/complaints as noted below.  Isaac Delgado is c/o of Other (Face to Face evaluation for a hospital bed)       Subjective:     Chief Complaint   Patient presents with    Other     Face to Face evaluation for a hospital bed       HPI  Reason for visit---Face to Face office visit  For Hospital Bed  Hospital Bed Requested: Manual  Semi-Electric Full-Electric  Heavy Duty    Dose the client require the head of the bed to elevated greater than 30 degrees for more than 50%of the time due to a respiratory condition : Yes    The patient has CHF, COPD and OANH and is having episodes of desaturation with his oxygen level.      Patient is tossing and turning all night long due to breathing difficulty and pain ---RLS andchronic back pain    If yes please describe the type and severity of the respiratory condition.  Patient states that Anytime he lays down to sleep he feels like he is suffocating.   Patient states that this is causing him to lose sleep and is extremely fatigued constantly due to the lack of sleep.  He states he is is only sleeping about 1.5 hours at a time.   Wearing 4L/min per nc at bedtime continuously and intermittently through out the day  Not doing cpap because he is not able to tolerate CPAP machine  He states he is having to sleep in recliner to breathe    Have pillows, bolsters,foam wedges, or rolled-up towels been tried : Yes    Why would a manual hospital bed not meet the client's needs: patient unable to manipulate manual bed and also needs to change positions frequently    Dose the client require immediate (emergent) position change: Yes  Up every 1-1.5 hours per night due to pain and trying to reposition self.     Is the client at risk for aspiration ? No  If yes

## 2024-02-21 ASSESSMENT — PATIENT HEALTH QUESTIONNAIRE - PHQ9
4. FEELING TIRED OR HAVING LITTLE ENERGY: 3
SUM OF ALL RESPONSES TO PHQ QUESTIONS 1-9: 16
7. TROUBLE CONCENTRATING ON THINGS, SUCH AS READING THE NEWSPAPER OR WATCHING TELEVISION: 2
5. POOR APPETITE OR OVEREATING: 3
SUM OF ALL RESPONSES TO PHQ QUESTIONS 1-9: 16
2. FEELING DOWN, DEPRESSED OR HOPELESS: 1
3. TROUBLE FALLING OR STAYING ASLEEP: 3
1. LITTLE INTEREST OR PLEASURE IN DOING THINGS: 3
SUM OF ALL RESPONSES TO PHQ QUESTIONS 1-9: 16
6. FEELING BAD ABOUT YOURSELF - OR THAT YOU ARE A FAILURE OR HAVE LET YOURSELF OR YOUR FAMILY DOWN: 0
8. MOVING OR SPEAKING SO SLOWLY THAT OTHER PEOPLE COULD HAVE NOTICED. OR THE OPPOSITE - BEING SO FIDGETY OR RESTLESS THAT YOU HAVE BEEN MOVING AROUND A LOT MORE THAN USUAL: SEVERAL DAYS
6. FEELING BAD ABOUT YOURSELF - OR THAT YOU ARE A FAILURE OR HAVE LET YOURSELF OR YOUR FAMILY DOWN: NOT AT ALL
4. FEELING TIRED OR HAVING LITTLE ENERGY: NEARLY EVERY DAY
SUM OF ALL RESPONSES TO PHQ9 QUESTIONS 1 & 2: 4
8. MOVING OR SPEAKING SO SLOWLY THAT OTHER PEOPLE COULD HAVE NOTICED. OR THE OPPOSITE, BEING SO FIGETY OR RESTLESS THAT YOU HAVE BEEN MOVING AROUND A LOT MORE THAN USUAL: 1
3. TROUBLE FALLING OR STAYING ASLEEP: NEARLY EVERY DAY
10. IF YOU CHECKED OFF ANY PROBLEMS, HOW DIFFICULT HAVE THESE PROBLEMS MADE IT FOR YOU TO DO YOUR WORK, TAKE CARE OF THINGS AT HOME, OR GET ALONG WITH OTHER PEOPLE: 3
9. THOUGHTS THAT YOU WOULD BE BETTER OFF DEAD, OR OF HURTING YOURSELF: NOT AT ALL
2. FEELING DOWN, DEPRESSED OR HOPELESS: SEVERAL DAYS
SUM OF ALL RESPONSES TO PHQ QUESTIONS 1-9: 16
SUM OF ALL RESPONSES TO PHQ QUESTIONS 1-9: 16
7. TROUBLE CONCENTRATING ON THINGS, SUCH AS READING THE NEWSPAPER OR WATCHING TELEVISION: MORE THAN HALF THE DAYS
10. IF YOU CHECKED OFF ANY PROBLEMS, HOW DIFFICULT HAVE THESE PROBLEMS MADE IT FOR YOU TO DO YOUR WORK, TAKE CARE OF THINGS AT HOME, OR GET ALONG WITH OTHER PEOPLE: EXTREMELY DIFFICULT
5. POOR APPETITE OR OVEREATING: NEARLY EVERY DAY
9. THOUGHTS THAT YOU WOULD BE BETTER OFF DEAD, OR OF HURTING YOURSELF: 0
1. LITTLE INTEREST OR PLEASURE IN DOING THINGS: NEARLY EVERY DAY

## 2024-02-22 ENCOUNTER — OFFICE VISIT (OUTPATIENT)
Dept: FAMILY MEDICINE CLINIC | Age: 64
End: 2024-02-22
Payer: MEDICARE

## 2024-02-22 ENCOUNTER — TELEPHONE (OUTPATIENT)
Dept: PULMONOLOGY | Age: 64
End: 2024-02-22

## 2024-02-22 VITALS
OXYGEN SATURATION: 97 % | SYSTOLIC BLOOD PRESSURE: 110 MMHG | HEART RATE: 88 BPM | WEIGHT: 285 LBS | HEIGHT: 64 IN | DIASTOLIC BLOOD PRESSURE: 70 MMHG | BODY MASS INDEX: 48.65 KG/M2

## 2024-02-22 DIAGNOSIS — G89.4 PAIN SYNDROME, CHRONIC: ICD-10-CM

## 2024-02-22 DIAGNOSIS — G25.81 RLS (RESTLESS LEGS SYNDROME): ICD-10-CM

## 2024-02-22 DIAGNOSIS — G47.33 OSA (OBSTRUCTIVE SLEEP APNEA): ICD-10-CM

## 2024-02-22 DIAGNOSIS — J44.9 CHRONIC OBSTRUCTIVE PULMONARY DISEASE, UNSPECIFIED COPD TYPE (HCC): ICD-10-CM

## 2024-02-22 DIAGNOSIS — J44.1 COPD EXACERBATION (HCC): Primary | ICD-10-CM

## 2024-02-22 DIAGNOSIS — G60.9 IDIOPATHIC PERIPHERAL NEUROPATHY: ICD-10-CM

## 2024-02-22 DIAGNOSIS — I50.42 CHRONIC COMBINED SYSTOLIC AND DIASTOLIC CONGESTIVE HEART FAILURE (HCC): Primary | ICD-10-CM

## 2024-02-22 PROCEDURE — 90674 CCIIV4 VAC NO PRSV 0.5 ML IM: CPT | Performed by: NURSE PRACTITIONER

## 2024-02-22 PROCEDURE — G0008 ADMIN INFLUENZA VIRUS VAC: HCPCS | Performed by: NURSE PRACTITIONER

## 2024-02-22 PROCEDURE — 99213 OFFICE O/P EST LOW 20 MIN: CPT | Performed by: NURSE PRACTITIONER

## 2024-02-22 RX ORDER — PRAMIPEXOLE DIHYDROCHLORIDE 0.5 MG/1
0.5 TABLET ORAL NIGHTLY
Qty: 30 TABLET | Refills: 2 | Status: SHIPPED | OUTPATIENT
Start: 2024-02-22

## 2024-02-22 ASSESSMENT — ENCOUNTER SYMPTOMS
ALLERGIC/IMMUNOLOGIC NEGATIVE: 1
EYES NEGATIVE: 1
BLOOD IN STOOL: 0
SHORTNESS OF BREATH: 1
BACK PAIN: 1
ANAL BLEEDING: 0
NAUSEA: 0
ABDOMINAL PAIN: 0
GASTROINTESTINAL NEGATIVE: 1
COUGH: 1

## 2024-02-22 NOTE — TELEPHONE ENCOUNTER
Pt is currently pt of [place] Dr Mcclendon he would like to transfer here to Dr Lara. Told him I would send a message to find out from Dr Lara if he would accept as pt. He also needs to be released from [place].

## 2024-02-22 NOTE — PATIENT INSTRUCTIONS
[facility] [place] 7 am - 7:30 pm  [address], [facility] [address]  [phone]    [facility] 8 am - 8 pm  [address] [place]  [address]  [phone]

## 2024-02-26 ENCOUNTER — TELEPHONE (OUTPATIENT)
Dept: PULMONOLOGY | Age: 64
End: 2024-02-26

## 2024-02-26 NOTE — TELEPHONE ENCOUNTER
Pt called to get scheduled. He was released from [place].  Pt is on 4 liters at night but having to wear it more during the day. When pt is up and moving around he is at 87-88 not on O2. When sitting still with no movement he is only able to get to 90-92. Has not had a PFT since 2021. The first  available appt isn't til 4/2/24 is there anyway to get pt in sooner?

## 2024-02-26 NOTE — TELEPHONE ENCOUNTER
Pt would like to transfer care to Dr Lara in [place]. He needs a letter of dismissal from [facility] in order to get appt with Jane. Please advise    Please address letter to Attention Cindy

## 2024-02-26 NOTE — TELEPHONE ENCOUNTER
Patient called back and said he is being released from his previous pulm office and will have that letter tomorrow (2/27/24). Antonia like a call back from Cindy to schedule and ask questions.

## 2024-02-26 NOTE — TELEPHONE ENCOUNTER
No need for dismissal letter, it is his choice that he is changing which is fine with me to see Dr. Lara

## 2024-02-26 NOTE — TELEPHONE ENCOUNTER
Pt is going to call [facility] to get released. Once he is released he will call back to get appointment with Dr Lara. Needing to be seen for SOB

## 2024-03-01 RX ORDER — BENZONATATE 200 MG/1
CAPSULE ORAL
Qty: 21 CAPSULE | Refills: 0 | Status: SHIPPED | OUTPATIENT
Start: 2024-03-01

## 2024-03-01 NOTE — TELEPHONE ENCOUNTER
Refill Request     CONFIRM preferrred pharmacy with the patient.    If Mail Order Rx - Pend for 90 day refill.      Last Seen: Last Seen Department: 2/22/2024  Last Seen by PCP: 2/22/2024    Last Written: 1/30/2024    If no future appointment scheduled, route STAFF MESSAGE with patient name to the  Pool for scheduling.      Next Appointment:   Future Appointments   Date Time Provider Department Center   3/7/2024  1:00 PM Genaro Turpin MD [facility] AND [facility] [place]   3/15/2024  8:00 AM SCHEDULE, [facility] PFT [facility] PFT [facility]   3/18/2024 10:00 AM Florecita Chavez, APRN - CNP [place] [facility] [place] - [facility]   3/22/2024 10:10 AM Genaro Turpin MD [facility] AND [facility] [place]   4/3/2024  9:20 AM Sebastian Lara MD AND PULM [facility]   10/22/2024  9:00 AM MMO CT RM 1 [facility] [place] [facility]       Message sent to  to schedule appt with patient?  NO      Requested Prescriptions     Pending Prescriptions Disp Refills    benzonatate (TESSALON) 200 MG capsule [Pharmacy Med Name: BENZONATATE 200 MG CAPSULE] 21 capsule 0     Sig: TAKE 1 CAPSULE BY MOUTH 3 TIMES A DAY AS NEEDED FOR COUGH

## 2024-03-07 ENCOUNTER — HOSPITAL ENCOUNTER (OUTPATIENT)
Age: 64
Setting detail: OUTPATIENT SURGERY
Discharge: HOME | End: 2024-03-07
Attending: ANESTHESIOLOGY | Admitting: ANESTHESIOLOGY
Payer: MEDICARE

## 2024-03-07 VITALS
WEIGHT: 282 LBS | HEART RATE: 93 BPM | BODY MASS INDEX: 48.14 KG/M2 | RESPIRATION RATE: 18 BRPM | TEMPERATURE: 98.7 F | HEIGHT: 64 IN | SYSTOLIC BLOOD PRESSURE: 143 MMHG | DIASTOLIC BLOOD PRESSURE: 92 MMHG | OXYGEN SATURATION: 93 %

## 2024-03-07 LAB
GLUCOSE BLD-MCNC: 173 MG/DL (ref 70–99)
PERFORMED ON: ABNORMAL

## 2024-03-07 PROCEDURE — 64484 NJX AA&/STRD TFRM EPI L/S EA: CPT | Performed by: ANESTHESIOLOGY

## 2024-03-07 PROCEDURE — 7100000010 HC PHASE II RECOVERY - FIRST 15 MIN: Performed by: ANESTHESIOLOGY

## 2024-03-07 PROCEDURE — 3600000002 HC SURGERY LEVEL 2 BASE: Performed by: ANESTHESIOLOGY

## 2024-03-07 PROCEDURE — 2500000003 HC RX 250 WO HCPCS: Performed by: ANESTHESIOLOGY

## 2024-03-07 PROCEDURE — 64483 NJX AA&/STRD TFRM EPI L/S 1: CPT | Performed by: ANESTHESIOLOGY

## 2024-03-07 PROCEDURE — 6360000002 HC RX W HCPCS: Performed by: ANESTHESIOLOGY

## 2024-03-07 PROCEDURE — 6360000004 HC RX CONTRAST MEDICATION: Performed by: ANESTHESIOLOGY

## 2024-03-07 RX ORDER — BETAMETHASONE SODIUM PHOSPHATE AND BETAMETHASONE ACETATE 3; 3 MG/ML; MG/ML
INJECTION, SUSPENSION INTRA-ARTICULAR; INTRALESIONAL; INTRAMUSCULAR; SOFT TISSUE
Status: DISCONTINUED
Start: 2024-03-07 | End: 2024-03-07 | Stop reason: HOSPADM

## 2024-03-07 RX ORDER — LIDOCAINE HYDROCHLORIDE 10 MG/ML
INJECTION, SOLUTION EPIDURAL; INFILTRATION; INTRACAUDAL; PERINEURAL PRN
Status: DISCONTINUED | OUTPATIENT
Start: 2024-03-07 | End: 2024-03-07 | Stop reason: ALTCHOICE

## 2024-03-07 ASSESSMENT — PAIN - FUNCTIONAL ASSESSMENT
PAIN_FUNCTIONAL_ASSESSMENT: NONE - DENIES PAIN
PAIN_FUNCTIONAL_ASSESSMENT: 0-10
PAIN_FUNCTIONAL_ASSESSMENT: PREVENTS OR INTERFERES SOME ACTIVE ACTIVITIES AND ADLS

## 2024-03-07 ASSESSMENT — PAIN DESCRIPTION - DESCRIPTORS: DESCRIPTORS: ACHING;SHARP

## 2024-03-07 NOTE — DISCHARGE INSTRUCTIONS
[facility]    [phone]    Post Pain Management Injection    PATIENT INSTRUCTIONS:     -Resume Normal Diet  -Other    ACTIVITY:    -No driving or operating machinery for 8 hours post procedure without sedation and 24 hours if you had sedation.  If you are seen driving during this time the proper authorities will be notified.  -Do not stay alone for 4-6 hours after the procedure.  -If you have had IV sedation, do not sign legal documents, make any major decisions, or be involved in work decisions for the remainder of the day.  -May shower or bathe.  -Resume normal activity when full movement/sensation has returned in extremities.           3)  SITE CARE:    -Observe puncture site for signs of infection (redness, warmth swelling, drainage with a foul odor, fever or increased tenderness).           4)  EXPECTED SIDE EFFECTS:    -Numbness/tingling/weakness in extremities, if this lasts more than 6 hours notify Dr. Turpin.  -Muscle stiffness, soreness at puncture site (soreness may last 2-4 days).          DIABETIC PATIENTS ONLY:    -Increased glucose levels in all diabetic patients who have received a steroid injection.  -Monitor blood sugars frequently for the first 5 days following procedure.      -Adjust medication accordingly.           5)  TO REACH DR. TURPIN: Call [phone]    6)   ADDITIONAL INSTRUCTIONS:    Follow-up as scheduled or call for appointment if not already done.  Patients taking blood thinners may resume as prescribed. Coumadin can be restarted this evening, all other blood thinners will be resumed tomorrow.

## 2024-03-07 NOTE — PROCEDURES
Procedure: Left L5-S1, S1 Lumbar transforaminal epidural steroid injection under fluoroscopic guidance  Lumbar transforaminal cortisone inj w/ fluoro  15029  Lumbar transforaminal cortisone inj w/ fluoro add'l level  54032    Diagnosis: Lumbar radiculopathy M54.16    Medical Necessity:  The patient has radicular type pain that travels down their affected extremity.  This pain we are treating stems from radicular pain/radiculopathy as documented by axial location with radiating pain to extremity associated with numbness/tingling and/or weakness resulting in radiculopathy. The levels selected today was based on the patient's history, exam, and radiological findings.      The patient has failed to respond/symptoms have not improved to less invasive options including conservative therapy and oral medication (OTC medications, NSAIDs), activity modification, home exercise regimen, lifestyle changes, and treatment by other medical practitioners  The patient suffers from chronic pain that is causing psychological, social, and physical impairment  History and Physical exam and Radiological findings are consistent with the listed diagnosis and confirm the pathology which requires intervention  We are attempting this interventional technique as a method to treat the pain/achieve pain reduction and achieve functional improvement/improvement in ADLs, while minimizing the need for any/excessive oral medications or addicting pain killers  The patient has filled out a pain diary to assess the perceived functional improvement and VAS pain scores through the intervention phase     Procedure:  Informed Consent  Informed consent was obtained after the risks and benefits of the procedure were discussed in detail with the patient; to include but not limited to infection and or bleeding at the injection site, soreness at the injection site, nerve injury, incomplete pain control, worsening of pain, and headache.  All questions were

## 2024-03-14 ENCOUNTER — ENROLLMENT (OUTPATIENT)
Dept: PHARMACY | Facility: CLINIC | Age: 64
End: 2024-03-14

## 2024-03-15 ASSESSMENT — PATIENT HEALTH QUESTIONNAIRE - PHQ9
SUM OF ALL RESPONSES TO PHQ QUESTIONS 1-9: 19
SUM OF ALL RESPONSES TO PHQ QUESTIONS 1-9: 19
1. LITTLE INTEREST OR PLEASURE IN DOING THINGS: MORE THAN HALF THE DAYS
3. TROUBLE FALLING OR STAYING ASLEEP: NEARLY EVERY DAY
2. FEELING DOWN, DEPRESSED OR HOPELESS: SEVERAL DAYS
7. TROUBLE CONCENTRATING ON THINGS, SUCH AS READING THE NEWSPAPER OR WATCHING TELEVISION: NEARLY EVERY DAY
7. TROUBLE CONCENTRATING ON THINGS, SUCH AS READING THE NEWSPAPER OR WATCHING TELEVISION: NEARLY EVERY DAY
1. LITTLE INTEREST OR PLEASURE IN DOING THINGS: MORE THAN HALF THE DAYS
6. FEELING BAD ABOUT YOURSELF - OR THAT YOU ARE A FAILURE OR HAVE LET YOURSELF OR YOUR FAMILY DOWN: SEVERAL DAYS
10. IF YOU CHECKED OFF ANY PROBLEMS, HOW DIFFICULT HAVE THESE PROBLEMS MADE IT FOR YOU TO DO YOUR WORK, TAKE CARE OF THINGS AT HOME, OR GET ALONG WITH OTHER PEOPLE: VERY DIFFICULT
9. THOUGHTS THAT YOU WOULD BE BETTER OFF DEAD, OR OF HURTING YOURSELF: NOT AT ALL
8. MOVING OR SPEAKING SO SLOWLY THAT OTHER PEOPLE COULD HAVE NOTICED. OR THE OPPOSITE - BEING SO FIDGETY OR RESTLESS THAT YOU HAVE BEEN MOVING AROUND A LOT MORE THAN USUAL: NEARLY EVERY DAY
9. THOUGHTS THAT YOU WOULD BE BETTER OFF DEAD, OR OF HURTING YOURSELF: NOT AT ALL
2. FEELING DOWN, DEPRESSED OR HOPELESS: SEVERAL DAYS
5. POOR APPETITE OR OVEREATING: NEARLY EVERY DAY
SUM OF ALL RESPONSES TO PHQ QUESTIONS 1-9: 19
5. POOR APPETITE OR OVEREATING: NEARLY EVERY DAY
SUM OF ALL RESPONSES TO PHQ9 QUESTIONS 1 & 2: 3
4. FEELING TIRED OR HAVING LITTLE ENERGY: NEARLY EVERY DAY
3. TROUBLE FALLING OR STAYING ASLEEP: NEARLY EVERY DAY
10. IF YOU CHECKED OFF ANY PROBLEMS, HOW DIFFICULT HAVE THESE PROBLEMS MADE IT FOR YOU TO DO YOUR WORK, TAKE CARE OF THINGS AT HOME, OR GET ALONG WITH OTHER PEOPLE: VERY DIFFICULT
8. MOVING OR SPEAKING SO SLOWLY THAT OTHER PEOPLE COULD HAVE NOTICED. OR THE OPPOSITE, BEING SO FIGETY OR RESTLESS THAT YOU HAVE BEEN MOVING AROUND A LOT MORE THAN USUAL: NEARLY EVERY DAY
6. FEELING BAD ABOUT YOURSELF - OR THAT YOU ARE A FAILURE OR HAVE LET YOURSELF OR YOUR FAMILY DOWN: SEVERAL DAYS
4. FEELING TIRED OR HAVING LITTLE ENERGY: NEARLY EVERY DAY
SUM OF ALL RESPONSES TO PHQ QUESTIONS 1-9: 19
SUM OF ALL RESPONSES TO PHQ QUESTIONS 1-9: 19

## 2024-03-18 ENCOUNTER — APPOINTMENT (OUTPATIENT)
Dept: CT IMAGING | Age: 64
End: 2024-03-18
Payer: MEDICARE

## 2024-03-18 ENCOUNTER — OFFICE VISIT (OUTPATIENT)
Dept: FAMILY MEDICINE CLINIC | Age: 64
End: 2024-03-18
Payer: MEDICARE

## 2024-03-18 ENCOUNTER — HOSPITAL ENCOUNTER (EMERGENCY)
Age: 64
Discharge: HOME | End: 2024-03-18
Payer: MEDICARE

## 2024-03-18 VITALS
SYSTOLIC BLOOD PRESSURE: 120 MMHG | WEIGHT: 279 LBS | DIASTOLIC BLOOD PRESSURE: 68 MMHG | HEIGHT: 64 IN | BODY MASS INDEX: 47.63 KG/M2 | OXYGEN SATURATION: 92 % | HEART RATE: 56 BPM

## 2024-03-18 VITALS
BODY MASS INDEX: 47.2 KG/M2 | HEART RATE: 97 BPM | OXYGEN SATURATION: 95 % | DIASTOLIC BLOOD PRESSURE: 63 MMHG | SYSTOLIC BLOOD PRESSURE: 110 MMHG | WEIGHT: 275 LBS | TEMPERATURE: 98.2 F | RESPIRATION RATE: 19 BRPM

## 2024-03-18 DIAGNOSIS — E78.2 MIXED HYPERLIPIDEMIA: ICD-10-CM

## 2024-03-18 DIAGNOSIS — R23.3 ABNORMAL BRUISING: ICD-10-CM

## 2024-03-18 DIAGNOSIS — I50.42 CHRONIC COMBINED SYSTOLIC AND DIASTOLIC CONGESTIVE HEART FAILURE (HCC): ICD-10-CM

## 2024-03-18 DIAGNOSIS — E55.9 VITAMIN D DEFICIENCY: ICD-10-CM

## 2024-03-18 DIAGNOSIS — J44.9 CHRONIC OBSTRUCTIVE PULMONARY DISEASE, UNSPECIFIED COPD TYPE (HCC): ICD-10-CM

## 2024-03-18 DIAGNOSIS — Z98.890 STATUS POST LEFT HEART CATHETERIZATION: ICD-10-CM

## 2024-03-18 DIAGNOSIS — E87.6 HYPOKALEMIA: ICD-10-CM

## 2024-03-18 DIAGNOSIS — F32.9 REACTIVE DEPRESSION: ICD-10-CM

## 2024-03-18 DIAGNOSIS — E11.649 UNCONTROLLED TYPE 2 DIABETES MELLITUS WITH HYPOGLYCEMIA WITHOUT COMA (HCC): Primary | ICD-10-CM

## 2024-03-18 DIAGNOSIS — G25.81 RLS (RESTLESS LEGS SYNDROME): ICD-10-CM

## 2024-03-18 DIAGNOSIS — G89.18 POST-OPERATIVE PAIN: Primary | ICD-10-CM

## 2024-03-18 LAB
ALBUMIN SERPL-MCNC: 3.7 G/DL (ref 3.4–5)
ALBUMIN/GLOB SERPL: 1.3 {RATIO} (ref 1.1–2.2)
ALP SERPL-CCNC: 104 U/L (ref 40–129)
ALT SERPL-CCNC: 32 U/L (ref 10–40)
ANION GAP SERPL CALCULATED.3IONS-SCNC: 11 MMOL/L (ref 3–16)
AST SERPL-CCNC: 20 U/L (ref 15–37)
BASOPHILS # BLD: 0.1 K/UL (ref 0–0.2)
BASOPHILS NFR BLD: 1 %
BILIRUB SERPL-MCNC: 1.1 MG/DL (ref 0–1)
BILIRUB UR QL STRIP.AUTO: NEGATIVE
BUN SERPL-MCNC: 15 MG/DL (ref 7–20)
CALCIUM SERPL-MCNC: 9.3 MG/DL (ref 8.3–10.6)
CHLORIDE SERPL-SCNC: 90 MMOL/L (ref 99–110)
CLARITY UR: CLEAR
CO2 SERPL-SCNC: 35 MMOL/L (ref 21–32)
COLOR UR: ABNORMAL
CREAT SERPL-MCNC: 0.9 MG/DL (ref 0.8–1.3)
DEPRECATED RDW RBC AUTO: 18.4 % (ref 12.4–15.4)
EKG ATRIAL RATE: 92 BPM
EKG DIAGNOSIS: NORMAL
EKG P AXIS: 35 DEGREES
EKG P-R INTERVAL: 194 MS
EKG Q-T INTERVAL: 384 MS
EKG QRS DURATION: 132 MS
EKG QTC CALCULATION (BAZETT): 474 MS
EKG R AXIS: 252 DEGREES
EKG T AXIS: 42 DEGREES
EKG VENTRICULAR RATE: 92 BPM
EOSINOPHIL # BLD: 0.1 K/UL (ref 0–0.6)
EOSINOPHIL NFR BLD: 0.8 %
GFR SERPLBLD CREATININE-BSD FMLA CKD-EPI: >60 ML/MIN/{1.73_M2}
GLUCOSE SERPL-MCNC: 255 MG/DL (ref 70–99)
GLUCOSE UR STRIP.AUTO-MCNC: >=1000 MG/DL
HCT VFR BLD AUTO: 50.3 % (ref 40.5–52.5)
HGB BLD-MCNC: 16.3 G/DL (ref 13.5–17.5)
HGB UR QL STRIP.AUTO: NEGATIVE
INR PPP: 1 (ref 0.84–1.16)
KETONES UR STRIP.AUTO-MCNC: NEGATIVE MG/DL
LEUKOCYTE ESTERASE UR QL STRIP.AUTO: NEGATIVE
LYMPHOCYTES # BLD: 1.8 K/UL (ref 1–5.1)
LYMPHOCYTES NFR BLD: 13.2 %
MAGNESIUM SERPL-MCNC: 1.9 MG/DL (ref 1.8–2.4)
MCH RBC QN AUTO: 27.2 PG (ref 26–34)
MCHC RBC AUTO-ENTMCNC: 32.3 G/DL (ref 31–36)
MCV RBC AUTO: 84.3 FL (ref 80–100)
MONOCYTES # BLD: 0.6 K/UL (ref 0–1.3)
MONOCYTES NFR BLD: 4.4 %
NEUTROPHILS # BLD: 11.2 K/UL (ref 1.7–7.7)
NEUTROPHILS NFR BLD: 80.6 %
NITRITE UR QL STRIP.AUTO: NEGATIVE
PH UR STRIP.AUTO: 7 [PH] (ref 5–8)
PLATELET # BLD AUTO: 224 K/UL (ref 135–450)
PMV BLD AUTO: 8.5 FL (ref 5–10.5)
POTASSIUM SERPL-SCNC: 3 MMOL/L (ref 3.5–5.1)
PROT SERPL-MCNC: 6.6 G/DL (ref 6.4–8.2)
PROT UR STRIP.AUTO-MCNC: NEGATIVE MG/DL
PROTHROMBIN TIME: 13.2 SEC (ref 11.5–14.8)
RBC # BLD AUTO: 5.97 M/UL (ref 4.2–5.9)
SODIUM SERPL-SCNC: 136 MMOL/L (ref 136–145)
SP GR UR STRIP.AUTO: 1.01 (ref 1–1.03)
TROPONIN, HIGH SENSITIVITY: 15 NG/L (ref 0–22)
TROPONIN, HIGH SENSITIVITY: 15 NG/L (ref 0–22)
UA COMPLETE W REFLEX CULTURE PNL UR: ABNORMAL
UA DIPSTICK W REFLEX MICRO PNL UR: ABNORMAL
URN SPEC COLLECT METH UR: ABNORMAL
UROBILINOGEN UR STRIP-ACNC: 0.2 E.U./DL
WBC # BLD AUTO: 13.9 K/UL (ref 4–11)

## 2024-03-18 PROCEDURE — 6360000004 HC RX CONTRAST MEDICATION: Performed by: NURSE PRACTITIONER

## 2024-03-18 PROCEDURE — 80053 COMPREHEN METABOLIC PANEL: CPT

## 2024-03-18 PROCEDURE — 85025 COMPLETE CBC W/AUTO DIFF WBC: CPT

## 2024-03-18 PROCEDURE — 81003 URINALYSIS AUTO W/O SCOPE: CPT

## 2024-03-18 PROCEDURE — 93005 ELECTROCARDIOGRAM TRACING: CPT | Performed by: NURSE PRACTITIONER

## 2024-03-18 PROCEDURE — 74174 CTA ABD&PLVS W/CONTRAST: CPT

## 2024-03-18 PROCEDURE — 83735 ASSAY OF MAGNESIUM: CPT

## 2024-03-18 PROCEDURE — 84484 ASSAY OF TROPONIN QUANT: CPT

## 2024-03-18 PROCEDURE — 93010 ELECTROCARDIOGRAM REPORT: CPT | Performed by: STUDENT IN AN ORGANIZED HEALTH CARE EDUCATION/TRAINING PROGRAM

## 2024-03-18 PROCEDURE — 99213 OFFICE O/P EST LOW 20 MIN: CPT | Performed by: NURSE PRACTITIONER

## 2024-03-18 PROCEDURE — 36415 COLL VENOUS BLD VENIPUNCTURE: CPT

## 2024-03-18 PROCEDURE — 6370000000 HC RX 637 (ALT 250 FOR IP): Performed by: NURSE PRACTITIONER

## 2024-03-18 PROCEDURE — 99285 EMERGENCY DEPT VISIT HI MDM: CPT

## 2024-03-18 PROCEDURE — 85610 PROTHROMBIN TIME: CPT

## 2024-03-18 RX ORDER — POTASSIUM CHLORIDE 20 MEQ/1
40 TABLET, EXTENDED RELEASE ORAL ONCE
Status: COMPLETED | OUTPATIENT
Start: 2024-03-18 | End: 2024-03-18

## 2024-03-18 RX ADMIN — IOPAMIDOL 85 ML: 755 INJECTION, SOLUTION INTRAVENOUS at 15:10

## 2024-03-18 RX ADMIN — POTASSIUM CHLORIDE 40 MEQ: 1500 TABLET, EXTENDED RELEASE ORAL at 17:02

## 2024-03-18 SDOH — ECONOMIC STABILITY: FOOD INSECURITY: WITHIN THE PAST 12 MONTHS, THE FOOD YOU BOUGHT JUST DIDN'T LAST AND YOU DIDN'T HAVE MONEY TO GET MORE.: NEVER TRUE

## 2024-03-18 SDOH — ECONOMIC STABILITY: FOOD INSECURITY: WITHIN THE PAST 12 MONTHS, YOU WORRIED THAT YOUR FOOD WOULD RUN OUT BEFORE YOU GOT MONEY TO BUY MORE.: NEVER TRUE

## 2024-03-18 SDOH — ECONOMIC STABILITY: INCOME INSECURITY: HOW HARD IS IT FOR YOU TO PAY FOR THE VERY BASICS LIKE FOOD, HOUSING, MEDICAL CARE, AND HEATING?: NOT HARD AT ALL

## 2024-03-18 ASSESSMENT — ANXIETY QUESTIONNAIRES
IF YOU CHECKED OFF ANY PROBLEMS ON THIS QUESTIONNAIRE, HOW DIFFICULT HAVE THESE PROBLEMS MADE IT FOR YOU TO DO YOUR WORK, TAKE CARE OF THINGS AT HOME, OR GET ALONG WITH OTHER PEOPLE: VERY DIFFICULT
GAD7 TOTAL SCORE: 14
3. WORRYING TOO MUCH ABOUT DIFFERENT THINGS: MORE THAN HALF THE DAYS
5. BEING SO RESTLESS THAT IT IS HARD TO SIT STILL: MORE THAN HALF THE DAYS
1. FEELING NERVOUS, ANXIOUS, OR ON EDGE: MORE THAN HALF THE DAYS
4. TROUBLE RELAXING: MORE THAN HALF THE DAYS
7. FEELING AFRAID AS IF SOMETHING AWFUL MIGHT HAPPEN: SEVERAL DAYS
2. NOT BEING ABLE TO STOP OR CONTROL WORRYING: MORE THAN HALF THE DAYS
6. BECOMING EASILY ANNOYED OR IRRITABLE: NEARLY EVERY DAY

## 2024-03-18 ASSESSMENT — ENCOUNTER SYMPTOMS
COUGH: 0
ABDOMINAL PAIN: 0
NAUSEA: 0
VOMITING: 0
SORE THROAT: 0
BACK PAIN: 0
EYE PAIN: 0

## 2024-03-18 ASSESSMENT — PAIN DESCRIPTION - ORIENTATION: ORIENTATION: RIGHT

## 2024-03-18 ASSESSMENT — PAIN - FUNCTIONAL ASSESSMENT: PAIN_FUNCTIONAL_ASSESSMENT: 0-10

## 2024-03-18 ASSESSMENT — PAIN DESCRIPTION - LOCATION: LOCATION: GROIN

## 2024-03-18 ASSESSMENT — PAIN SCALES - GENERAL: PAINLEVEL_OUTOF10: 7

## 2024-03-18 ASSESSMENT — PAIN DESCRIPTION - DESCRIPTORS: DESCRIPTORS: ACHING

## 2024-03-18 ASSESSMENT — PAIN DESCRIPTION - PAIN TYPE: TYPE: ACUTE PAIN

## 2024-03-18 NOTE — ED PROVIDER NOTES
Skin is warm and dry.      Capillary Refill: Capillary refill takes less than 2 seconds.   Neurological:      General: No focal deficit present.      Mental Status: He is alert and oriented to person, place, and time.   Psychiatric:         Mood and Affect: Mood normal.         Behavior: Behavior normal.           DIAGNOSTIC RESULTS   LABS:    Labs Reviewed   CBC WITH AUTO DIFFERENTIAL - Abnormal; Notable for the following components:       Result Value    WBC 13.9 (*)     RBC 5.97 (*)     RDW 18.4 (*)     Neutrophils Absolute 11.2 (*)     All other components within normal limits   COMPREHENSIVE METABOLIC PANEL W/ REFLEX TO MG FOR LOW K - Abnormal; Notable for the following components:    Potassium reflex Magnesium 3.0 (*)     Chloride 90 (*)     CO2 35 (*)     Glucose 255 (*)     Total Bilirubin 1.1 (*)     All other components within normal limits   URINALYSIS WITH REFLEX TO CULTURE - Abnormal; Notable for the following components:    Glucose, Ur >=1000 (*)     All other components within normal limits   PROTIME-INR   TROPONIN   TROPONIN   MAGNESIUM       When ordered only abnormal lab results are displayed. All other labs were within normal range or not returned as of this dictation.    EKG: When ordered, EKG's are interpreted by the Emergency Department Physician in the absence of a cardiologist.  Please see their note for interpretation of EKG.    RADIOLOGY:   Non-plain film images such as CT, Ultrasound and MRI are read by the radiologist. Plain radiographic images are visualized and preliminarily interpreted by the ED Provider with the below findings:         Interpretation per the Radiologist below, if available at the time of this note:    CTA ABDOMEN PELVIS W CONTRAST   Final Result   1. No acute intra-abdominal or pelvic abnormality.   2. Subcutaneous fat stranding along the right inguinal crease near the right   common femoral vein suggestive of recent central line or attempted central   venous access.

## 2024-03-18 NOTE — PROGRESS NOTES
early in the course of exacerbations.    RLS (restless legs syndrome)  Condition appears stable with current medication regimen. Continue current medications. No reported or noted side effects of medication. Will continue to monitor at routine intervals as appropriate.     Mixed hyperlipidemia  The patient is asked to make an attempt to improve diet and exercise patterns to aid in medical management of this problem.  Reactive depression  Condition appears stable with current medication regimen. Continue current medications. No reported or noted side effects of medication. Will continue to monitor at routine intervals as appropriate.   Vitamin D deficiency  Discussed with patient that we make vitamin D from the sun and get it from some food sources, but it is very common to be deficient.  Discussed the need for vitamin D replacement because low vitamin D can cause fatigue, joint aches and has been implicated in heart disease, bone disease like osteoporosis, and some other chronic illnesses.    Patient will start/continue vitamin D supplement as per order.   Recommend vitamin D to be rechecked in 6 months.      Abnormal bruising  -     US PELVIS LIMITED; Future    Status post left heart catheterization  -     US PELVIS LIMITED; Future    Pt had C 3/12/2024 @ [facility].   Reports over the weekend severe pain, discoloration and swelling to his groin and testicles.  States that he noticed them turning almost black the following day after procedure but did not report this and wanted to give it time to heal on its own.     Patient has been instructed call the office immediately with new symptoms, change in symptoms or worseningof symptoms. If this is not feasible, patient is instructed to report to the emergency room. Medication profile reviewed. Medication side effects and possible impairments from medications were discussed as applicable.Allergies were reviewed. Health maintenance was reviewed and updated as appropriate.

## 2024-03-18 NOTE — PATIENT INSTRUCTIONS
Not feeling your best?  Where to go for the right care at the right time.    Dear Isaac Delgado   I wanted to provide you with some information that might help you seek care for your condition when your primary care provider or specialist is unavailable. If you have a need outside of normal business hours, you should first contact your primary care office or specialist caring for your condition. They may have on-call providers that could assist with your care. During office hours, you may request a virtual or same day appointment.   But what if your primary care provider is not in the office that day and you can't wait until the  next day for care? In that situation, your next option is to visit an urgent care facility.          [facility] now has urgent care sites open to support our community.   [facility] is a great alternative when you need immediate medical care that is not a serious threat to your health or your doctor's office is closed or unable to get you in for an appointment. The urgent care centers offer fast access to [facility] doctors for minor illnesses and injuries for patients of all ages. There are other medical services available including lab testing, X-rays, EKGs, and IV fluids.  Locations are open daily from 8 a.m. - 8 p.m.     [facility]  [address], [place], [place] [address]  [phone]    [address], [place], [place] [address]  [phone]    Local Urgent Care     [facility] 8:30 am - 7:70 pm   [address] [place] [address]  [phone]    [facility]     8 am - 8 pm   [address] [place] [address]  [phone]    [facility] 7 am - 7:30 pm  [address] [place] [address]  [phone]     [facility] [place] 7 am - 7:30 pm  [address] [place] [address]  [phone]    [place]

## 2024-03-22 RX ORDER — BENZONATATE 200 MG/1
CAPSULE ORAL
Qty: 21 CAPSULE | Refills: 0 | Status: SHIPPED | OUTPATIENT
Start: 2024-03-22

## 2024-03-22 ASSESSMENT — ENCOUNTER SYMPTOMS
COUGH: 1
BACK PAIN: 1
BLOOD IN STOOL: 0
COLOR CHANGE: 1
ABDOMINAL PAIN: 0
NAUSEA: 0
EYES NEGATIVE: 1
GASTROINTESTINAL NEGATIVE: 1
ALLERGIC/IMMUNOLOGIC NEGATIVE: 1
SHORTNESS OF BREATH: 1

## 2024-03-22 NOTE — TELEPHONE ENCOUNTER
Future appt scheduled 09/23/2024                   Last appt 03/18/2024      Last Written 03/01/2024      benzonatate (TESSALON) 200 MG capsule   #21  0 RF

## 2024-04-05 DIAGNOSIS — J44.9 MODERATE COPD (CHRONIC OBSTRUCTIVE PULMONARY DISEASE) (HCC): ICD-10-CM

## 2024-04-09 RX ORDER — ALBUTEROL SULFATE 90 UG/1
2 AEROSOL, METERED RESPIRATORY (INHALATION) EVERY 4 HOURS PRN
Qty: 3 EACH | Refills: 1 | Status: SHIPPED | OUTPATIENT
Start: 2024-04-09

## 2024-04-09 RX ORDER — FLUTICASONE FUROATE, UMECLIDINIUM BROMIDE AND VILANTEROL TRIFENATATE 100; 62.5; 25 UG/1; UG/1; UG/1
1 POWDER RESPIRATORY (INHALATION) DAILY
Qty: 3 EACH | Refills: 1 | Status: SHIPPED | OUTPATIENT
Start: 2024-04-09

## 2024-04-18 ENCOUNTER — HOSPITAL ENCOUNTER (OUTPATIENT)
Dept: PULMONOLOGY | Age: 64
Discharge: HOME | End: 2024-04-18
Payer: MEDICARE

## 2024-04-18 ENCOUNTER — OFFICE VISIT (OUTPATIENT)
Dept: PULMONOLOGY | Age: 64
End: 2024-04-18

## 2024-04-18 VITALS
WEIGHT: 287 LBS | OXYGEN SATURATION: 91 % | DIASTOLIC BLOOD PRESSURE: 72 MMHG | RESPIRATION RATE: 16 BRPM | BODY MASS INDEX: 49 KG/M2 | SYSTOLIC BLOOD PRESSURE: 121 MMHG | HEIGHT: 64 IN | HEART RATE: 84 BPM | TEMPERATURE: 97.6 F

## 2024-04-18 VITALS — OXYGEN SATURATION: 93 %

## 2024-04-18 DIAGNOSIS — G47.33 OSA (OBSTRUCTIVE SLEEP APNEA): ICD-10-CM

## 2024-04-18 DIAGNOSIS — R91.8 LUNG NODULES: ICD-10-CM

## 2024-04-18 DIAGNOSIS — Z87.891 FORMER SMOKER: ICD-10-CM

## 2024-04-18 DIAGNOSIS — I51.7 LEFT VENTRICULAR HYPERTROPHY: ICD-10-CM

## 2024-04-18 DIAGNOSIS — I51.89 DIASTOLIC DYSFUNCTION: ICD-10-CM

## 2024-04-18 DIAGNOSIS — G47.34 NOCTURNAL HYPOXEMIA: ICD-10-CM

## 2024-04-18 DIAGNOSIS — J43.2 CENTRILOBULAR EMPHYSEMA (HCC): Primary | ICD-10-CM

## 2024-04-18 DIAGNOSIS — J44.9 COPD, VERY SEVERE (HCC): ICD-10-CM

## 2024-04-18 DIAGNOSIS — E66.01 MORBID OBESITY WITH BMI OF 45.0-49.9, ADULT (HCC): ICD-10-CM

## 2024-04-18 DIAGNOSIS — J44.1 COPD EXACERBATION (HCC): ICD-10-CM

## 2024-04-18 LAB
DLCO %PRED: 69 %
DLCO PRED: NORMAL
DLCO/VA %PRED: NORMAL
DLCO/VA PRED: NORMAL
DLCO/VA: NORMAL
DLCO: NORMAL
EXPIRATORY TIME-POST: NORMAL
EXPIRATORY TIME: NORMAL
FEF 25-75 %CHNG: NORMAL
FEF 25-75 POST %PRED: NORMAL
FEF 25-75% %PRED-PRE: NORMAL
FEF 25-75% PRED: NORMAL
FEF 25-75-POST: NORMAL
FEF 25-75-PRE: NORMAL
FEV1 %PRED-POST: 28 %
FEV1 %PRED-PRE: 30 %
FEV1 PRED: NORMAL
FEV1-POST: NORMAL
FEV1-PRE: NORMAL
FEV1/FVC %PRED-POST: 58 %
FEV1/FVC %PRED-PRE: 57 %
FEV1/FVC PRED: NORMAL
FEV1/FVC-POST: NORMAL
FEV1/FVC-PRE: NORMAL
FVC %PRED-POST: 48 L
FVC %PRED-PRE: 53 %
FVC PRED: NORMAL
FVC-POST: NORMAL
FVC-PRE: NORMAL
GAW %PRED: NORMAL
GAW PRED: NORMAL
GAW: NORMAL
IC PRE %PRED: NORMAL
IC PRED: NORMAL
IC: NORMAL
MEP: NORMAL
MIP: NORMAL
MVV %PRED-PRE: NORMAL
MVV PRED: NORMAL
MVV-PRE: NORMAL
PEF %PRED-POST: NORMAL
PEF %PRED-PRE: NORMAL
PEF PRED: NORMAL
PEF%CHNG: NORMAL
PEF-POST: NORMAL
PEF-PRE: NORMAL
RAW %PRED: NORMAL
RAW PRED: NORMAL
RAW: NORMAL
RV PRE %PRED: NORMAL
RV PRED: NORMAL
RV: NORMAL
SVC %PRED: NORMAL
SVC PRED: NORMAL
SVC: NORMAL
TLC PRE %PRED: 75 %
TLC PRED: NORMAL
TLC: NORMAL
VA %PRED: NORMAL
VA PRED: NORMAL
VA: NORMAL
VTG %PRED: NORMAL
VTG PRED: NORMAL
VTG: NORMAL

## 2024-04-18 PROCEDURE — 94618 PULMONARY STRESS TESTING: CPT

## 2024-04-18 PROCEDURE — 94726 PLETHYSMOGRAPHY LUNG VOLUMES: CPT

## 2024-04-18 PROCEDURE — 94729 DIFFUSING CAPACITY: CPT

## 2024-04-18 PROCEDURE — 6370000000 HC RX 637 (ALT 250 FOR IP): Performed by: INTERNAL MEDICINE

## 2024-04-18 PROCEDURE — 94060 EVALUATION OF WHEEZING: CPT

## 2024-04-18 RX ORDER — ALBUTEROL SULFATE 90 UG/1
4 AEROSOL, METERED RESPIRATORY (INHALATION) ONCE
Status: COMPLETED | OUTPATIENT
Start: 2024-04-18 | End: 2024-04-18

## 2024-04-18 RX ADMIN — Medication 4 PUFF: at 10:24

## 2024-04-18 ASSESSMENT — PULMONARY FUNCTION TESTS
FEV1_PERCENT_PREDICTED_PRE: 30
FEV1/FVC_PERCENT_PREDICTED_PRE: 57
FEV1_PERCENT_PREDICTED_POST: 28
FEV1/FVC_PERCENT_PREDICTED_POST: 58
FVC_PERCENT_PREDICTED_PRE: 53
FVC_PERCENT_PREDICTED_POST: 48

## 2024-04-18 NOTE — PATIENT INSTRUCTIONS
Remember to bring a list of pulmonary medications and any CPAP or BiPAP machines to your next appointment with the office.     Please keep all of your future appointments scheduled by [facility] Pulmonary office. Out of respect for other patients and providers, you may be asked to reschedule your appointment if you arrive later than your scheduled appointment time. Appointments cancelled less than 24hrs in advance will be considered a no show. Patients with three missed appointments within 1 year or four missed appointments within 2 years can be dismissed from the practice.     Please be aware that our physicians are required to work in the Intensive Care Unit at [facility].  Your appointment may need to be rescheduled if they are designated to work during your appointment time.      You may receive a survey regarding the care you received during your visit.  Your input is valuable to us.  We encourage you to complete and return your survey.  We hope you will choose us in the future for your healthcare needs.     Pt instructed of all future appointment dates & times, including radiology, labs, procedures & referrals. If procedures were scheduled preparation instructions provided. Instructions on future appointments with [facility] were given.

## 2024-04-18 NOTE — PROGRESS NOTES
MA Communication:  The following orders are received by verbal communication from Sebastian Lara MD    Orders include:    Patient referral to Jeff Mojica MD  For zephyr valve consultation.

## 2024-04-18 NOTE — PROCEDURES
[facility] Pulmonary Function Lab - Six Minute Walk      Test Performed on:   Room Air___X___   Oxygen at _____ lpm via N/C- continuous Oxygen at _____ lpm via N/C- OCD  Assist Device Used During Test:  None___X___ Cane________ Walker_________    Modified Kelly's Scale  0 Nothing at all 5 Strong    0.5 Just noticeable 6 Stronger (Hard)    1 Very Light 7 Very Strong   2 Light 8 Very Very Strong   3 Moderate 9 Extremely Strong   4 Somewhat Strong 10 Maximum All out      Time SpO2 Heart Rate Respiratory Rate Dyspnea-  Modified Kelly’s Scale Fatigue- Modified Kelly’s Scale Other Symptoms   Baseline                     92% room @rest 76 18 3 3      1 minute                     90% 97 19 4 4    2 minutes                     88% 101 20 4 4      3 minutes                     90% 99 21 5 5    4 minutes                     91% 101 21 5 5    5 minutes                     90% 101 22 6 6    6 minutes                     90% 101 23 6 6    Recovery x 1 minute                     94% 84 22 5 5    Recovery x 2 Minute                     93% 82 20 5 5       umber of Laps_____8__ X 120 feet + _________ additional feet = Total Distance ___960__feet     Total expected 6 MW distance is _______feet. Patient achieved ______% of expected distance.   Pre Blood Pressure:  Post Blood Pressure:  Other symptoms at the end of exercise: sob, chest pain/pressure

## 2024-04-19 ENCOUNTER — TELEPHONE (OUTPATIENT)
Dept: PULMONOLOGY | Age: 64
End: 2024-04-19

## 2024-04-19 DIAGNOSIS — J44.9 CHRONIC OBSTRUCTIVE PULMONARY DISEASE, UNSPECIFIED COPD TYPE (HCC): Primary | ICD-10-CM

## 2024-04-19 PROBLEM — I51.89 DIASTOLIC DYSFUNCTION: Status: ACTIVE | Noted: 2024-04-19

## 2024-04-19 PROBLEM — G47.34 NOCTURNAL HYPOXEMIA: Status: ACTIVE | Noted: 2024-04-19

## 2024-04-19 PROBLEM — I51.7 LEFT VENTRICULAR HYPERTROPHY: Status: ACTIVE | Noted: 2024-04-19

## 2024-04-19 PROBLEM — Z87.891 FORMER SMOKER: Status: ACTIVE | Noted: 2024-04-19

## 2024-04-19 PROBLEM — R91.8 LUNG NODULES: Status: ACTIVE | Noted: 2024-04-19

## 2024-04-19 NOTE — PROCEDURES
[address] [address]                           PULMONARY FUNCTION      PATIENT NAME: YOHAN MONGE              : 1960  MED REC NO: [id number]                      ROOM:   ACCOUNT NO: [id number]                       ADMIT DATE: 2024  PROVIDER: Sebastian Lara MD      DATE OF PROCEDURE:      INDICATIONS:  The patient is a 63-year-old male, who has come to the office for a pulmonary evaluation for COPD, who had a PFT done for COPD.    FINDINGS:  The patient's spirometry shows FVC to be 53%, FEV1 to be 30%.  FEV1/FVC ratio was 57%.  YLM20-85% was 12%.  The patient did not have a significant post-bronchodilator improvement.  Lung volumes showed total lung capacity was mildly reduced at 75%.  The patient has air trapping.  The patient also has decrease in ERV.  Diffusion capacity when adjusted for volume was increased.  Flow volume was suggestive of obstructive pattern.    The patient also underwent a 6-minute walk test, which shows baseline oxygen saturation of 92% on room air at rest with a heart rate of 76, respirations of 18, dyspnea modified Kelly scale of 3, fatigue modified Kelly scale of 3.  The patient's oxygen dropped to 88% after 2 minutes of walking, but did not drop anything further, and the patient's recovery pattern shows 93% oxygen on room air.  The patient walked 960 feet.  On the basis of this PFT, the patient has very severe obstructive airway disease with mild restrictive lung disease.  Along with that, the patient has changes in the PFT parameters because of body habitus.  The patient did not have any exertional hypoxemia on suboptimal exercise.  Please correlate clinically.  The patient's FEV1 has decreased significantly as compared .  Please correlate.          SEBASTIAN LARA MD      D:  2024 16:23:04     T:  2024 10:01:56     SK/AVELINO  Job #:  [id number]     Doc#:  [id number]

## 2024-04-19 NOTE — PROGRESS NOTES
C/o Pulmonary evaluation      HPI: Patient is a 63-year-old male who has come to the office for a pulm evaluation to be established to this practice, [facility], patient states that he has shortness of breath all the time, patient has phlegm along with the shortness of breath which is clear but quite thick like a concrete, patient has coughing, patient does not have any increased wheezing, patient does have some paroxysmal coughing which is significant, patient does not have any increasing sinus congestion, patient does not have any significant otalgia no ear discharge, patient does not have any tinnitus, patient does have questionable oropharyngeal dysphagia, patient does have some pleuritic chest pain, no significant fever or chills, patient does have vomiting in the morning time, no increasing reflux symptoms, patient does have leg swelling and takes water pill but patient states that he does not take too much of increased salt, patient does feel some swelling in pain in the knuckles, pain is a former smoker who quit in 2020, patient does not have a change in the am environment, patient has a dog cat and 2 birds as pets, no mold exposure, patient weights fluctuate, no history of any exposure to any chemicals or fumes, patient used to be a  and was exposed to a lot of gravel dirt and asphalt, no history of any obvious asbestos exposure, patient has OANH but was not able to tolerate the CPAP, patient does not have any new medications, patient takes Trelegy Ellipta but in top of it that patient takes rescue inhaler 8-10 times a day, patient also is having nocturnal hypoxemia with requirement for at least 4 L of nasal cannula oxygen at nighttime but then also patient states that his saturations sometimes drops to 78 to 82% at times, patient does not have any confusion lethargy, patient states that his quality of life has gone down and he wants some help

## 2024-04-22 DIAGNOSIS — G47.33 OSA (OBSTRUCTIVE SLEEP APNEA): ICD-10-CM

## 2024-04-22 DIAGNOSIS — J44.9 COPD, VERY SEVERE (HCC): Primary | ICD-10-CM

## 2024-04-22 RX ORDER — PREDNISONE 10 MG/1
10 TABLET ORAL DAILY
Qty: 30 TABLET | Refills: 3 | Status: SHIPPED | OUTPATIENT
Start: 2024-04-22 | End: 2024-08-20

## 2024-04-22 RX ORDER — AZITHROMYCIN 500 MG/1
250 TABLET, FILM COATED ORAL EVERY OTHER DAY
Qty: 15 TABLET | Refills: 3 | Status: SHIPPED | OUTPATIENT
Start: 2024-04-22 | End: 2024-12-18

## 2024-04-22 NOTE — TELEPHONE ENCOUNTER
Patient contacted and relate message, patient was agreeable for medication to be send to his pharmacy and also would like to a referral to ENT.

## 2024-04-22 NOTE — TELEPHONE ENCOUNTER
Patient states he is not a candidate for the zephyr valve replacement and he wants to know why and what are the next steps for his treatment.  Please advise. Thanks

## 2024-04-23 NOTE — TELEPHONE ENCOUNTER
Pharmacy called to get clarification on the directions for the Zithromax script.  Please advise correct directions and quantity.

## 2024-04-23 NOTE — TELEPHONE ENCOUNTER
Sebastian Lara MD Decker, Patricia, MA; [facility] Clinical Staff16 hours ago (3:58 PM)       Medication sent to the pharmacy  Referral to ENT sent for UPPP evaluation

## 2024-04-26 ENCOUNTER — TELEPHONE (OUTPATIENT)
Dept: ENT CLINIC | Age: 64
End: 2024-04-26

## 2024-05-09 ENCOUNTER — TELEPHONE (OUTPATIENT)
Dept: PULMONOLOGY | Age: 64
End: 2024-05-09

## 2024-05-09 ENCOUNTER — OFFICE VISIT (OUTPATIENT)
Dept: ENT CLINIC | Age: 64
End: 2024-05-09
Payer: MEDICARE

## 2024-05-09 VITALS
HEART RATE: 62 BPM | DIASTOLIC BLOOD PRESSURE: 64 MMHG | SYSTOLIC BLOOD PRESSURE: 120 MMHG | BODY MASS INDEX: 49 KG/M2 | HEIGHT: 64 IN | WEIGHT: 287 LBS

## 2024-05-09 DIAGNOSIS — G47.33 OSA (OBSTRUCTIVE SLEEP APNEA): Primary | ICD-10-CM

## 2024-05-09 DIAGNOSIS — R06.02 SOB (SHORTNESS OF BREATH): ICD-10-CM

## 2024-05-09 PROCEDURE — 99203 OFFICE O/P NEW LOW 30 MIN: CPT | Performed by: OTOLARYNGOLOGY

## 2024-05-09 ASSESSMENT — ENCOUNTER SYMPTOMS
SHORTNESS OF BREATH: 1
SINUS PRESSURE: 0
COUGH: 0
EYE ITCHING: 0
APNEA: 0
FACIAL SWELLING: 0
VOICE CHANGE: 0
SORE THROAT: 0
TROUBLE SWALLOWING: 0

## 2024-05-09 NOTE — PROGRESS NOTES
HYDROcodone-acetaminophen (NORCO)  MG per tablet Take 1 tablet by mouth every 6 hours as needed for Pain (per pain management care).      metoprolol succinate (TOPROL XL) 25 MG extended release tablet TAKE ONE TABLET BY MOUTH DAILY (Patient taking differently: Take 1 tablet by mouth daily) 90 tablet 1    Omega-3 Fatty Acids (FISH OIL) 1000 MG CAPS Take 2 capsules by mouth 2 times daily 90 capsule 3    clopidogrel (PLAVIX) 75 MG tablet Take 1 tablet by mouth daily 90 tablet 3    Multiple Vitamins-Minerals (THERAPEUTIC MULTIVITAMIN-MINERALS) tablet Take 1 tablet by mouth daily      OXYGEN Inhale 4 L into the lungs as needed for Shortness of Breath or Other Uses 4L at night      nitroGLYCERIN (NITROSTAT) 0.4 MG SL tablet Place 1 tablet under the tongue every 5 minutes as needed for Chest pain 25 tablet 3    aspirin 81 MG EC tablet Take 1 tablet by mouth daily 30 tablet 0     No current facility-administered medications for this visit.       Review of Systems     Review of Systems   Constitutional:  Negative for appetite change, chills, fatigue, fever and unexpected weight change.   HENT:  Negative for congestion, ear discharge, ear pain, facial swelling, hearing loss, nosebleeds, postnasal drip, sinus pressure, sneezing, sore throat, tinnitus, trouble swallowing and voice change.    Eyes:  Negative for itching.   Respiratory:  Positive for shortness of breath. Negative for apnea and cough.    Endocrine: Negative for cold intolerance and heat intolerance.   Musculoskeletal:  Negative for myalgias and neck pain.   Skin:  Negative for rash.   Allergic/Immunologic: Negative for environmental allergies.   Neurological:  Negative for dizziness and headaches.   Psychiatric/Behavioral:  Negative for confusion, decreased concentration and sleep disturbance.          PhysicalExam     Vitals:    05/09/24 0932   BP: 120/64   Pulse: 62   Weight: 130.2 kg (287 lb)   Height: 1.626 m (5' 4\")       Physical Exam  Constitutional:

## 2024-05-09 NOTE — TELEPHONE ENCOUNTER
Patient called stating that he was just see at the ENT office and that they are not able to help him. Per note from Dr. Benoit:    1. OANH (obstructive sleep apnea)  -Discussed with patient that based on anatomy likelihood of success with U P3 is around 30% given Mallampati 4, obtuse cervical mental angle and neck circumference  -He is not a candidate for inspire given BMI  -Discussed option of tracheostomy not interested     2. SOB (shortness of breath)  -Daytime shortness of breath with low SpO2  -Recommend follow-up with pulmonology        Offered U P3 but discussed realistic expectations.  Not interested in procedure given recovery and chance of success.        Katerina Benoit, DO  5/9/24    Patient wanting to know what he should do next.  Please advise.

## 2024-05-10 ENCOUNTER — TELEPHONE (OUTPATIENT)
Dept: BARIATRICS/WEIGHT MGMT | Age: 64
End: 2024-05-10

## 2024-05-10 NOTE — TELEPHONE ENCOUNTER
Left message for pt regarding November [employer] seminar for [employer]. Office number provided to return call to Lilian. Ext [phone]

## 2024-05-15 ENCOUNTER — TELEPHONE (OUTPATIENT)
Dept: PULMONOLOGY | Age: 64
End: 2024-05-15

## 2024-05-15 DIAGNOSIS — J44.9 COPD, VERY SEVERE (HCC): Primary | ICD-10-CM

## 2024-05-15 RX ORDER — PREDNISONE 10 MG/1
TABLET ORAL
Qty: 30 TABLET | Refills: 0 | Status: SHIPPED | OUTPATIENT
Start: 2024-05-15

## 2024-05-15 RX ORDER — CEFUROXIME AXETIL 500 MG/1
500 TABLET ORAL 2 TIMES DAILY
Qty: 14 TABLET | Refills: 0 | Status: SHIPPED | OUTPATIENT
Start: 2024-05-15 | End: 2024-05-22

## 2024-05-15 NOTE — TELEPHONE ENCOUNTER
Pt called stating he had left a message awhile back but never got a call about anything. He also stated that his oxygen levels are falling into the 70s during the night and staying between 85-87 during the day, he also mentioned that he has bee coughing up a lot of mucus and it looks like blood in it. Pt asked that someone please give him a call back to discuss.

## 2024-05-15 NOTE — TELEPHONE ENCOUNTER
Patient contacted and relate Dr Lara message and was agreeable to repeat a ONPO.    Order sent to patient DME

## 2024-05-18 DIAGNOSIS — I50.42 CHRONIC COMBINED SYSTOLIC AND DIASTOLIC CONGESTIVE HEART FAILURE (HCC): ICD-10-CM

## 2024-05-18 DIAGNOSIS — R60.9 EDEMA, UNSPECIFIED TYPE: ICD-10-CM

## 2024-05-20 RX ORDER — METOLAZONE 5 MG/1
TABLET ORAL
Qty: 10 TABLET | Refills: 0 | OUTPATIENT
Start: 2024-05-20

## 2024-05-20 RX ORDER — METFORMIN HYDROCHLORIDE 500 MG/1
TABLET, EXTENDED RELEASE ORAL
Qty: 180 TABLET | Refills: 0 | Status: SHIPPED | OUTPATIENT
Start: 2024-05-20

## 2024-05-20 NOTE — TELEPHONE ENCOUNTER
PT returned call and stated he is calling [facility]. PT stated this should have been sent to 's office.

## 2024-05-20 NOTE — TELEPHONE ENCOUNTER
Refill Request     CONFIRM preferrred pharmacy with the patient.    If Mail Order Rx - Pend for 90 day refill.      Last Seen: Last Seen Department: 3/18/2024  Last Seen by PCP: 3/18/2024    Last Written: 2/12/24    If no future appointment scheduled, route STAFF MESSAGE with patient name to the  Pool for scheduling.      Next Appointment:   Future Appointments   Date Time Provider Department Center   6/18/2024  8:50 AM Genaro Turpin MD [facility]   9/23/2024 10:00 AM Florecita Chavez, APRN - CNP [facility] [place] [facility]   10/22/2024  9:00 AM MMO CT RM 1 [facility]       Message sent to  to schedule appt with patient?  N/A      Requested Prescriptions     Pending Prescriptions Disp Refills    metFORMIN (GLUCOPHAGE-XR) 500 MG extended release tablet [Pharmacy Med Name: METFORMIN HCL  MG TABLET] 180 tablet 0     Sig: TAKE 2 TABLETS BY MOUTH DAILY WITH BREAKFAST

## 2024-05-20 NOTE — TELEPHONE ENCOUNTER
4/15/2024 [facility] cardiology  No upcoming appt  LMOM for pt to contact the office to clarify what cardiologist he is following with.

## 2024-05-22 DIAGNOSIS — I50.42 CHRONIC COMBINED SYSTOLIC AND DIASTOLIC CONGESTIVE HEART FAILURE (HCC): ICD-10-CM

## 2024-05-22 DIAGNOSIS — J44.9 CHRONIC OBSTRUCTIVE PULMONARY DISEASE, UNSPECIFIED COPD TYPE (HCC): Primary | ICD-10-CM

## 2024-05-22 DIAGNOSIS — R60.9 EDEMA, UNSPECIFIED TYPE: ICD-10-CM

## 2024-05-22 RX ORDER — DOXYCYCLINE HYCLATE 100 MG/1
100 CAPSULE ORAL 2 TIMES DAILY
Qty: 10 CAPSULE | Refills: 0 | Status: SHIPPED | OUTPATIENT
Start: 2024-05-22 | End: 2024-05-27

## 2024-05-22 RX ORDER — PREDNISONE 20 MG/1
TABLET ORAL
Qty: 10 TABLET | Refills: 0 | Status: SHIPPED | OUTPATIENT
Start: 2024-05-22

## 2024-05-22 NOTE — TELEPHONE ENCOUNTER
Call patient to follow up on ONPO and see how he was doing , patient still complaining of S.O.B, talking to patient didn't notice any distress in the conversation.  patient state hasn't done test.  Patient also mentioned that he had a visit to his cardiologist yesterday (5/22/24) and that he was very short of bread , that they assist giving oxygen to him .  Patient still complaining of S.O.B, talking to patient didn't notice any distress.    After finish conversation ,patient call back again stating that he didn't remember our conversation, and want to add that he still cough out blood (streak) patient state has  been for a week, ask patient if he mention this to his cardiologist during his visit  and what was done about, patient answer he did but cardiologist just say it was a pulmonary issue, patient was advise that he should go to ED to be assess about because is more of a week coughing  out blood and S.O.B.  Patient stating that he doesn't feel like going to the ED, was advise to find someone to take him ED because we do not want to turn this into worst health issues.    Patient acknowledge advise.

## 2024-05-22 NOTE — PROGRESS NOTES
Patient called that he is still coughing and having blood streaks in his phlegm after the recent steroid and antibiotic course ordered by Dr. Lara.  Will send another 5-day course of antibiotic and steroids and repeat chest x-ray next week.  Will ensure the patient goes to the emergency if his clinical status gets worse for further evaluation and may be CT angiogram to rule out PE.

## 2024-05-23 RX ORDER — METOLAZONE 5 MG/1
TABLET ORAL
Qty: 10 TABLET | Refills: 0 | OUTPATIENT
Start: 2024-05-23

## 2024-05-23 NOTE — TELEPHONE ENCOUNTER
5/20/204-  PT returned call and stated he is calling [facility]. PT stated this should have been sent to 's office.

## 2024-05-26 DIAGNOSIS — I50.42 CHRONIC COMBINED SYSTOLIC AND DIASTOLIC CONGESTIVE HEART FAILURE (HCC): ICD-10-CM

## 2024-05-26 DIAGNOSIS — R60.9 EDEMA, UNSPECIFIED TYPE: ICD-10-CM

## 2024-05-28 RX ORDER — METOLAZONE 5 MG/1
5 TABLET ORAL WEEKLY
Qty: 10 TABLET | Refills: 0 | Status: SHIPPED | OUTPATIENT
Start: 2024-05-28

## 2024-05-28 NOTE — TELEPHONE ENCOUNTER
Per pt 5/23/2024 5/20/204-  PT returned call and stated he is calling [facility]. PT stated this should have been sent to 's office.

## 2024-05-30 ENCOUNTER — HOSPITAL ENCOUNTER (OUTPATIENT)
Dept: GENERAL RADIOLOGY | Age: 64
Discharge: HOME | End: 2024-05-30
Payer: MEDICARE

## 2024-05-30 ENCOUNTER — HOSPITAL ENCOUNTER (OUTPATIENT)
Age: 64
Discharge: HOME | End: 2024-05-30
Payer: MEDICARE

## 2024-05-30 DIAGNOSIS — J44.9 CHRONIC OBSTRUCTIVE PULMONARY DISEASE, UNSPECIFIED COPD TYPE (HCC): ICD-10-CM

## 2024-05-30 PROCEDURE — 71046 X-RAY EXAM CHEST 2 VIEWS: CPT

## 2024-06-03 ENCOUNTER — APPOINTMENT (OUTPATIENT)
Dept: CT IMAGING | Age: 64
DRG: 190 | End: 2024-06-03
Payer: MEDICARE

## 2024-06-03 ENCOUNTER — OFFICE VISIT (OUTPATIENT)
Dept: PULMONOLOGY | Age: 64
End: 2024-06-03
Payer: MEDICARE

## 2024-06-03 ENCOUNTER — APPOINTMENT (OUTPATIENT)
Dept: GENERAL RADIOLOGY | Age: 64
DRG: 190 | End: 2024-06-03
Payer: MEDICARE

## 2024-06-03 ENCOUNTER — HOSPITAL ENCOUNTER (INPATIENT)
Age: 64
LOS: 2 days | Discharge: HOME | DRG: 190 | End: 2024-06-05
Attending: STUDENT IN AN ORGANIZED HEALTH CARE EDUCATION/TRAINING PROGRAM | Admitting: INTERNAL MEDICINE
Payer: MEDICARE

## 2024-06-03 VITALS
RESPIRATION RATE: 14 BRPM | BODY MASS INDEX: 49.17 KG/M2 | DIASTOLIC BLOOD PRESSURE: 59 MMHG | OXYGEN SATURATION: 86 % | HEIGHT: 64 IN | WEIGHT: 288 LBS | HEART RATE: 89 BPM | TEMPERATURE: 97.6 F | SYSTOLIC BLOOD PRESSURE: 92 MMHG

## 2024-06-03 DIAGNOSIS — J96.21 ACUTE ON CHRONIC RESPIRATORY FAILURE WITH HYPOXIA AND HYPERCAPNIA (HCC): Primary | ICD-10-CM

## 2024-06-03 DIAGNOSIS — I51.89 DIASTOLIC DYSFUNCTION: ICD-10-CM

## 2024-06-03 DIAGNOSIS — R51.9 ACUTE NONINTRACTABLE HEADACHE, UNSPECIFIED HEADACHE TYPE: ICD-10-CM

## 2024-06-03 DIAGNOSIS — J96.22 ACUTE ON CHRONIC RESPIRATORY FAILURE WITH HYPOXIA AND HYPERCAPNIA (HCC): Primary | ICD-10-CM

## 2024-06-03 DIAGNOSIS — J44.1 COPD EXACERBATION (HCC): ICD-10-CM

## 2024-06-03 DIAGNOSIS — G47.33 OSA (OBSTRUCTIVE SLEEP APNEA): ICD-10-CM

## 2024-06-03 DIAGNOSIS — I95.9 HYPOTENSION, UNSPECIFIED HYPOTENSION TYPE: Primary | ICD-10-CM

## 2024-06-03 DIAGNOSIS — G47.30 OBSERVED SLEEP APNEA: ICD-10-CM

## 2024-06-03 LAB
ALBUMIN SERPL-MCNC: 3.4 G/DL (ref 3.4–5)
ALBUMIN/GLOB SERPL: 1.3 {RATIO} (ref 1.1–2.2)
ALP SERPL-CCNC: 68 U/L (ref 40–129)
ALT SERPL-CCNC: 23 U/L (ref 10–40)
ANION GAP SERPL CALCULATED.3IONS-SCNC: 4 MMOL/L (ref 3–16)
AST SERPL-CCNC: 15 U/L (ref 15–37)
BASE EXCESS BLDA CALC-SCNC: 13.1 MMOL/L (ref -3–3)
BASE EXCESS BLDV CALC-SCNC: 10.9 MMOL/L (ref -3–3)
BASE EXCESS BLDV CALC-SCNC: 13.1 MMOL/L (ref -3–3)
BASE EXCESS BLDV CALC-SCNC: 14.2 MMOL/L (ref -3–3)
BASOPHILS # BLD: 0.1 K/UL (ref 0–0.2)
BASOPHILS NFR BLD: 0.5 %
BILIRUB SERPL-MCNC: 0.8 MG/DL (ref 0–1)
BUN SERPL-MCNC: 16 MG/DL (ref 7–20)
CALCIUM SERPL-MCNC: 9 MG/DL (ref 8.3–10.6)
CHLORIDE SERPL-SCNC: 93 MMOL/L (ref 99–110)
CO2 BLDA-SCNC: 44.6 MMOL/L
CO2 BLDV-SCNC: 44 MMOL/L
CO2 BLDV-SCNC: 44 MMOL/L
CO2 BLDV-SCNC: 45 MMOL/L
CO2 SERPL-SCNC: 43 MMOL/L (ref 21–32)
COHGB MFR BLDA: 2.3 % (ref 0–1.5)
COHGB MFR BLDV: 4.4 % (ref 0–1.5)
COHGB MFR BLDV: 5.6 % (ref 0–1.5)
COHGB MFR BLDV: 7.5 % (ref 0–1.5)
CREAT SERPL-MCNC: 0.9 MG/DL (ref 0.8–1.3)
DEPRECATED RDW RBC AUTO: 17.5 % (ref 12.4–15.4)
EKG ATRIAL RATE: 80 BPM
EKG DIAGNOSIS: NORMAL
EKG P AXIS: -18 DEGREES
EKG P-R INTERVAL: 196 MS
EKG Q-T INTERVAL: 392 MS
EKG QRS DURATION: 120 MS
EKG QTC CALCULATION (BAZETT): 452 MS
EKG R AXIS: 259 DEGREES
EKG T AXIS: 59 DEGREES
EKG VENTRICULAR RATE: 80 BPM
EOSINOPHIL # BLD: 0.1 K/UL (ref 0–0.6)
EOSINOPHIL NFR BLD: 0.5 %
GFR SERPLBLD CREATININE-BSD FMLA CKD-EPI: >90 ML/MIN/{1.73_M2}
GLUCOSE SERPL-MCNC: 157 MG/DL (ref 70–99)
HCO3 BLDA-SCNC: 42.4 MMOL/L (ref 21–29)
HCO3 BLDV-SCNC: 41.5 MMOL/L (ref 23–29)
HCO3 BLDV-SCNC: 42.3 MMOL/L (ref 23–29)
HCO3 BLDV-SCNC: 43.2 MMOL/L (ref 23–29)
HCT VFR BLD AUTO: 49.1 % (ref 40.5–52.5)
HGB BLD-MCNC: 15.4 G/DL (ref 13.5–17.5)
HGB BLDA-MCNC: 16.9 G/DL (ref 13.5–17.5)
LYMPHOCYTES # BLD: 1.7 K/UL (ref 1–5.1)
LYMPHOCYTES NFR BLD: 14.4 %
MAGNESIUM SERPL-MCNC: 2.1 MG/DL (ref 1.8–2.4)
MCH RBC QN AUTO: 27.2 PG (ref 26–34)
MCHC RBC AUTO-ENTMCNC: 31.3 G/DL (ref 31–36)
MCV RBC AUTO: 86.9 FL (ref 80–100)
METHGB MFR BLDA: 0.1 %
METHGB MFR BLDV: 0.3 %
MONOCYTES # BLD: 0.7 K/UL (ref 0–1.3)
MONOCYTES NFR BLD: 5.7 %
NEUTROPHILS # BLD: 9.2 K/UL (ref 1.7–7.7)
NEUTROPHILS NFR BLD: 78.9 %
NT-PROBNP SERPL-MCNC: 90 PG/ML (ref 0–124)
O2 THERAPY: ABNORMAL
PCO2 BLDA: 72.5 MMHG (ref 35–45)
PCO2 BLDV: 70.7 MMHG (ref 40–50)
PCO2 BLDV: 71.4 MMHG (ref 40–50)
PCO2 BLDV: 81.3 MMHG (ref 40–50)
PH BLDA: 7.38 [PH] (ref 7.35–7.45)
PH BLDV: 7.33 [PH] (ref 7.35–7.45)
PH BLDV: 7.39 [PH] (ref 7.35–7.45)
PH BLDV: 7.4 [PH] (ref 7.35–7.45)
PLATELET # BLD AUTO: 157 K/UL (ref 135–450)
PMV BLD AUTO: 8.3 FL (ref 5–10.5)
PO2 BLDA: 96.4 MMHG (ref 75–108)
PO2 BLDV: 106.4 MMHG (ref 25–40)
PO2 BLDV: 30.2 MMHG (ref 25–40)
PO2 BLDV: 37.7 MMHG (ref 25–40)
POTASSIUM SERPL-SCNC: 3.5 MMOL/L (ref 3.5–5.1)
PROT SERPL-MCNC: 6 G/DL (ref 6.4–8.2)
RBC # BLD AUTO: 5.65 M/UL (ref 4.2–5.9)
SAO2 % BLDA: 97.6 %
SAO2 % BLDV: 65 %
SAO2 % BLDV: 75 %
SAO2 % BLDV: 98 %
SODIUM SERPL-SCNC: 140 MMOL/L (ref 136–145)
TROPONIN, HIGH SENSITIVITY: 16 NG/L (ref 0–22)
WBC # BLD AUTO: 11.6 K/UL (ref 4–11)

## 2024-06-03 PROCEDURE — 6360000002 HC RX W HCPCS: Performed by: STUDENT IN AN ORGANIZED HEALTH CARE EDUCATION/TRAINING PROGRAM

## 2024-06-03 PROCEDURE — 96375 TX/PRO/DX INJ NEW DRUG ADDON: CPT

## 2024-06-03 PROCEDURE — 71045 X-RAY EXAM CHEST 1 VIEW: CPT

## 2024-06-03 PROCEDURE — 99285 EMERGENCY DEPT VISIT HI MDM: CPT

## 2024-06-03 PROCEDURE — 2060000000 HC ICU INTERMEDIATE R&B

## 2024-06-03 PROCEDURE — 94640 AIRWAY INHALATION TREATMENT: CPT

## 2024-06-03 PROCEDURE — 6370000000 HC RX 637 (ALT 250 FOR IP): Performed by: INTERNAL MEDICINE

## 2024-06-03 PROCEDURE — 6370000000 HC RX 637 (ALT 250 FOR IP): Performed by: STUDENT IN AN ORGANIZED HEALTH CARE EDUCATION/TRAINING PROGRAM

## 2024-06-03 PROCEDURE — 99223 1ST HOSP IP/OBS HIGH 75: CPT | Performed by: STUDENT IN AN ORGANIZED HEALTH CARE EDUCATION/TRAINING PROGRAM

## 2024-06-03 PROCEDURE — 5A09357 ASSISTANCE WITH RESPIRATORY VENTILATION, LESS THAN 24 CONSECUTIVE HOURS, CONTINUOUS POSITIVE AIRWAY PRESSURE: ICD-10-PCS | Performed by: INTERNAL MEDICINE

## 2024-06-03 PROCEDURE — 93005 ELECTROCARDIOGRAM TRACING: CPT | Performed by: STUDENT IN AN ORGANIZED HEALTH CARE EDUCATION/TRAINING PROGRAM

## 2024-06-03 PROCEDURE — 83880 ASSAY OF NATRIURETIC PEPTIDE: CPT

## 2024-06-03 PROCEDURE — 2580000003 HC RX 258: Performed by: STUDENT IN AN ORGANIZED HEALTH CARE EDUCATION/TRAINING PROGRAM

## 2024-06-03 PROCEDURE — 2580000003 HC RX 258: Performed by: INTERNAL MEDICINE

## 2024-06-03 PROCEDURE — 94660 CPAP INITIATION&MGMT: CPT

## 2024-06-03 PROCEDURE — 70450 CT HEAD/BRAIN W/O DYE: CPT

## 2024-06-03 PROCEDURE — 94761 N-INVAS EAR/PLS OXIMETRY MLT: CPT

## 2024-06-03 PROCEDURE — 80053 COMPREHEN METABOLIC PANEL: CPT

## 2024-06-03 PROCEDURE — 93010 ELECTROCARDIOGRAM REPORT: CPT | Performed by: INTERNAL MEDICINE

## 2024-06-03 PROCEDURE — 96374 THER/PROPH/DIAG INJ IV PUSH: CPT

## 2024-06-03 PROCEDURE — 85025 COMPLETE CBC W/AUTO DIFF WBC: CPT

## 2024-06-03 PROCEDURE — 99214 OFFICE O/P EST MOD 30 MIN: CPT | Performed by: INTERNAL MEDICINE

## 2024-06-03 PROCEDURE — 6360000002 HC RX W HCPCS: Performed by: INTERNAL MEDICINE

## 2024-06-03 PROCEDURE — 84484 ASSAY OF TROPONIN QUANT: CPT

## 2024-06-03 PROCEDURE — 36415 COLL VENOUS BLD VENIPUNCTURE: CPT

## 2024-06-03 PROCEDURE — 83735 ASSAY OF MAGNESIUM: CPT

## 2024-06-03 PROCEDURE — 82803 BLOOD GASES ANY COMBINATION: CPT

## 2024-06-03 PROCEDURE — 2700000000 HC OXYGEN THERAPY PER DAY

## 2024-06-03 RX ORDER — CYCLOBENZAPRINE HCL 10 MG
10 TABLET ORAL 3 TIMES DAILY PRN
Status: DISCONTINUED | OUTPATIENT
Start: 2024-06-03 | End: 2024-06-05 | Stop reason: HOSPADM

## 2024-06-03 RX ORDER — AMITRIPTYLINE HYDROCHLORIDE 25 MG/1
25 TABLET, FILM COATED ORAL NIGHTLY
Status: DISCONTINUED | OUTPATIENT
Start: 2024-06-03 | End: 2024-06-05 | Stop reason: HOSPADM

## 2024-06-03 RX ORDER — PREDNISONE 20 MG/1
40 TABLET ORAL DAILY
Status: DISCONTINUED | OUTPATIENT
Start: 2024-06-06 | End: 2024-06-03

## 2024-06-03 RX ORDER — HYDROCODONE BITARTRATE AND ACETAMINOPHEN 10; 325 MG/1; MG/1
1 TABLET ORAL EVERY 6 HOURS PRN
Status: DISCONTINUED | OUTPATIENT
Start: 2024-06-03 | End: 2024-06-05 | Stop reason: HOSPADM

## 2024-06-03 RX ORDER — MORPHINE SULFATE 2 MG/ML
2 INJECTION, SOLUTION INTRAMUSCULAR; INTRAVENOUS EVERY 4 HOURS PRN
Status: DISCONTINUED | OUTPATIENT
Start: 2024-06-03 | End: 2024-06-05 | Stop reason: HOSPADM

## 2024-06-03 RX ORDER — ISOSORBIDE MONONITRATE 30 MG/1
30 TABLET, EXTENDED RELEASE ORAL 2 TIMES DAILY
Status: DISCONTINUED | OUTPATIENT
Start: 2024-06-03 | End: 2024-06-03

## 2024-06-03 RX ORDER — PRAMIPEXOLE DIHYDROCHLORIDE 0.25 MG/1
0.5 TABLET ORAL NIGHTLY
Status: DISCONTINUED | OUTPATIENT
Start: 2024-06-03 | End: 2024-06-05 | Stop reason: HOSPADM

## 2024-06-03 RX ORDER — ACETAMINOPHEN 325 MG/1
650 TABLET ORAL EVERY 6 HOURS PRN
Status: DISCONTINUED | OUTPATIENT
Start: 2024-06-03 | End: 2024-06-05 | Stop reason: HOSPADM

## 2024-06-03 RX ORDER — LEVOFLOXACIN 500 MG/1
500 TABLET, FILM COATED ORAL DAILY
Status: DISCONTINUED | OUTPATIENT
Start: 2024-06-03 | End: 2024-06-05 | Stop reason: HOSPADM

## 2024-06-03 RX ORDER — SODIUM CHLORIDE 9 MG/ML
INJECTION, SOLUTION INTRAVENOUS PRN
Status: DISCONTINUED | OUTPATIENT
Start: 2024-06-03 | End: 2024-06-05 | Stop reason: HOSPADM

## 2024-06-03 RX ORDER — SODIUM CHLORIDE 0.9 % (FLUSH) 0.9 %
5-40 SYRINGE (ML) INJECTION EVERY 12 HOURS SCHEDULED
Status: DISCONTINUED | OUTPATIENT
Start: 2024-06-03 | End: 2024-06-05 | Stop reason: HOSPADM

## 2024-06-03 RX ORDER — FUROSEMIDE 10 MG/ML
40 INJECTION INTRAMUSCULAR; INTRAVENOUS ONCE
Status: COMPLETED | OUTPATIENT
Start: 2024-06-03 | End: 2024-06-03

## 2024-06-03 RX ORDER — IPRATROPIUM BROMIDE AND ALBUTEROL SULFATE 2.5; .5 MG/3ML; MG/3ML
1 SOLUTION RESPIRATORY (INHALATION)
Status: DISCONTINUED | OUTPATIENT
Start: 2024-06-03 | End: 2024-06-05 | Stop reason: HOSPADM

## 2024-06-03 RX ORDER — TORSEMIDE 20 MG/1
20 TABLET ORAL 2 TIMES DAILY
Status: DISCONTINUED | OUTPATIENT
Start: 2024-06-03 | End: 2024-06-05 | Stop reason: HOSPADM

## 2024-06-03 RX ORDER — CLOPIDOGREL BISULFATE 75 MG/1
75 TABLET ORAL DAILY
Status: DISCONTINUED | OUTPATIENT
Start: 2024-06-04 | End: 2024-06-05 | Stop reason: HOSPADM

## 2024-06-03 RX ORDER — ONDANSETRON 2 MG/ML
4 INJECTION INTRAMUSCULAR; INTRAVENOUS EVERY 6 HOURS PRN
Status: DISCONTINUED | OUTPATIENT
Start: 2024-06-03 | End: 2024-06-05 | Stop reason: HOSPADM

## 2024-06-03 RX ORDER — POLYETHYLENE GLYCOL 3350 17 G/17G
17 POWDER, FOR SOLUTION ORAL DAILY PRN
Status: DISCONTINUED | OUTPATIENT
Start: 2024-06-03 | End: 2024-06-05 | Stop reason: HOSPADM

## 2024-06-03 RX ORDER — ENOXAPARIN SODIUM 100 MG/ML
30 INJECTION SUBCUTANEOUS 2 TIMES DAILY
Status: DISCONTINUED | OUTPATIENT
Start: 2024-06-03 | End: 2024-06-05 | Stop reason: HOSPADM

## 2024-06-03 RX ORDER — ALBUTEROL SULFATE 2.5 MG/3ML
2.5 SOLUTION RESPIRATORY (INHALATION) ONCE
Status: COMPLETED | OUTPATIENT
Start: 2024-06-03 | End: 2024-06-03

## 2024-06-03 RX ORDER — BUTALBITAL, ACETAMINOPHEN AND CAFFEINE 50; 325; 40 MG/1; MG/1; MG/1
1 TABLET ORAL ONCE
Status: COMPLETED | OUTPATIENT
Start: 2024-06-03 | End: 2024-06-03

## 2024-06-03 RX ORDER — METOPROLOL SUCCINATE 50 MG/1
25 TABLET, EXTENDED RELEASE ORAL DAILY
Status: DISCONTINUED | OUTPATIENT
Start: 2024-06-04 | End: 2024-06-05 | Stop reason: HOSPADM

## 2024-06-03 RX ORDER — SODIUM CHLORIDE 0.9 % (FLUSH) 0.9 %
5-40 SYRINGE (ML) INJECTION PRN
Status: DISCONTINUED | OUTPATIENT
Start: 2024-06-03 | End: 2024-06-05 | Stop reason: HOSPADM

## 2024-06-03 RX ORDER — ONDANSETRON 4 MG/1
4 TABLET, ORALLY DISINTEGRATING ORAL EVERY 8 HOURS PRN
Status: DISCONTINUED | OUTPATIENT
Start: 2024-06-03 | End: 2024-06-05 | Stop reason: HOSPADM

## 2024-06-03 RX ORDER — PREGABALIN 75 MG/1
150 CAPSULE ORAL 2 TIMES DAILY
Status: DISCONTINUED | OUTPATIENT
Start: 2024-06-03 | End: 2024-06-05 | Stop reason: HOSPADM

## 2024-06-03 RX ORDER — IPRATROPIUM BROMIDE AND ALBUTEROL SULFATE 2.5; .5 MG/3ML; MG/3ML
1 SOLUTION RESPIRATORY (INHALATION)
Status: DISCONTINUED | OUTPATIENT
Start: 2024-06-03 | End: 2024-06-03

## 2024-06-03 RX ORDER — ALBUTEROL SULFATE 2.5 MG/3ML
2.5 SOLUTION RESPIRATORY (INHALATION) EVERY 6 HOURS PRN
Status: DISCONTINUED | OUTPATIENT
Start: 2024-06-03 | End: 2024-06-05 | Stop reason: HOSPADM

## 2024-06-03 RX ORDER — ASPIRIN 81 MG/1
81 TABLET ORAL DAILY
Status: DISCONTINUED | OUTPATIENT
Start: 2024-06-04 | End: 2024-06-05 | Stop reason: HOSPADM

## 2024-06-03 RX ORDER — MORPHINE SULFATE 4 MG/ML
4 INJECTION, SOLUTION INTRAMUSCULAR; INTRAVENOUS EVERY 4 HOURS PRN
Status: DISCONTINUED | OUTPATIENT
Start: 2024-06-03 | End: 2024-06-05 | Stop reason: HOSPADM

## 2024-06-03 RX ORDER — ACETAMINOPHEN 650 MG/1
650 SUPPOSITORY RECTAL EVERY 6 HOURS PRN
Status: DISCONTINUED | OUTPATIENT
Start: 2024-06-03 | End: 2024-06-05 | Stop reason: HOSPADM

## 2024-06-03 RX ADMIN — SODIUM CHLORIDE, PRESERVATIVE FREE 10 ML: 5 INJECTION INTRAVENOUS at 21:00

## 2024-06-03 RX ADMIN — BUTALBITAL, ACETAMINOPHEN AND CAFFEINE 1 TABLET: 325; 50; 40 TABLET ORAL at 13:17

## 2024-06-03 RX ADMIN — ALBUTEROL SULFATE 2.5 MG: 2.5 SOLUTION RESPIRATORY (INHALATION) at 11:53

## 2024-06-03 RX ADMIN — AMITRIPTYLINE HYDROCHLORIDE 25 MG: 25 TABLET, FILM COATED ORAL at 20:58

## 2024-06-03 RX ADMIN — IPRATROPIUM BROMIDE AND ALBUTEROL SULFATE 1 DOSE: 2.5; .5 SOLUTION RESPIRATORY (INHALATION) at 20:26

## 2024-06-03 RX ADMIN — AZITHROMYCIN MONOHYDRATE 500 MG: 500 INJECTION, POWDER, LYOPHILIZED, FOR SOLUTION INTRAVENOUS at 14:02

## 2024-06-03 RX ADMIN — ENOXAPARIN SODIUM 30 MG: 100 INJECTION SUBCUTANEOUS at 20:58

## 2024-06-03 RX ADMIN — MORPHINE SULFATE 4 MG: 4 INJECTION, SOLUTION INTRAMUSCULAR; INTRAVENOUS at 16:30

## 2024-06-03 RX ADMIN — FUROSEMIDE 40 MG: 10 INJECTION, SOLUTION INTRAMUSCULAR; INTRAVENOUS at 12:59

## 2024-06-03 RX ADMIN — IPRATROPIUM BROMIDE AND ALBUTEROL SULFATE 1 DOSE: 2.5; .5 SOLUTION RESPIRATORY (INHALATION) at 16:20

## 2024-06-03 RX ADMIN — IPRATROPIUM BROMIDE AND ALBUTEROL SULFATE 1 DOSE: 2.5; .5 SOLUTION RESPIRATORY (INHALATION) at 11:54

## 2024-06-03 RX ADMIN — WATER 40 MG: 1 INJECTION INTRAMUSCULAR; INTRAVENOUS; SUBCUTANEOUS at 20:58

## 2024-06-03 RX ADMIN — HYDROCODONE BITARTRATE AND ACETAMINOPHEN 1 TABLET: 10; 325 TABLET ORAL at 21:36

## 2024-06-03 RX ADMIN — PRAMIPEXOLE DIHYDROCHLORIDE 0.5 MG: 0.25 TABLET ORAL at 20:58

## 2024-06-03 RX ADMIN — WATER 125 MG: 1 INJECTION INTRAMUSCULAR; INTRAVENOUS; SUBCUTANEOUS at 11:47

## 2024-06-03 RX ADMIN — PREGABALIN 150 MG: 75 CAPSULE ORAL at 22:20

## 2024-06-03 RX ADMIN — TORSEMIDE 20 MG: 20 TABLET ORAL at 20:57

## 2024-06-03 RX ADMIN — CYCLOBENZAPRINE 10 MG: 10 TABLET, FILM COATED ORAL at 18:40

## 2024-06-03 ASSESSMENT — ENCOUNTER SYMPTOMS
EYE PAIN: 0
SHORTNESS OF BREATH: 1
WHEEZING: 0
COLOR CHANGE: 0
ABDOMINAL DISTENTION: 0
CONSTIPATION: 0
TROUBLE SWALLOWING: 0
ABDOMINAL PAIN: 0
EYE REDNESS: 0
NAUSEA: 0
EYE DISCHARGE: 0
BACK PAIN: 0
DIARRHEA: 0
EYE ITCHING: 0
STRIDOR: 0
SORE THROAT: 0
COUGH: 1
VOMITING: 0

## 2024-06-03 ASSESSMENT — PAIN DESCRIPTION - DESCRIPTORS: DESCRIPTORS: STABBING

## 2024-06-03 ASSESSMENT — PAIN SCALES - GENERAL
PAINLEVEL_OUTOF10: 8
PAINLEVEL_OUTOF10: 6
PAINLEVEL_OUTOF10: 6
PAINLEVEL_OUTOF10: 0
PAINLEVEL_OUTOF10: 6
PAINLEVEL_OUTOF10: 6

## 2024-06-03 ASSESSMENT — LIFESTYLE VARIABLES
HOW OFTEN DO YOU HAVE A DRINK CONTAINING ALCOHOL: NEVER
HOW OFTEN DO YOU HAVE A DRINK CONTAINING ALCOHOL: NEVER
HOW MANY STANDARD DRINKS CONTAINING ALCOHOL DO YOU HAVE ON A TYPICAL DAY: PATIENT DOES NOT DRINK
HOW MANY STANDARD DRINKS CONTAINING ALCOHOL DO YOU HAVE ON A TYPICAL DAY: PATIENT DOES NOT DRINK

## 2024-06-03 ASSESSMENT — PAIN DESCRIPTION - LOCATION
LOCATION: RIB CAGE
LOCATION: HEAD

## 2024-06-03 NOTE — PROGRESS NOTES
06/03/24 1354   NIV Type   $NIV $Daily Charge   Mode Bilevel   Mask Type Full face mask   Mask Size Large   Assessment   Pulse 88   Respirations 21   SpO2 92 %   Comfort Level Fair   Using Accessory Muscles No   Mask Compliance Good   Skin Assessment Clean, dry, & intact   Skin Protection for O2 Device Yes   Location Nose   Settings/Measurements   PIP Observed 13 cm H20   IPAP 12 cmH20   CPAP/EPAP 5 cmH2O   Vt (Measured) 844 mL   Rate Ordered 16   FiO2  100 %   Minute Volume (L/min) 17.4 Liters   Mask Leak (lpm) 84 lpm

## 2024-06-03 NOTE — ED NOTES
Patients socks, shoes, shorts, shirt, keys and wallet placed in belongings bag in bed with patient.

## 2024-06-03 NOTE — ED NOTES
Dr. Forton called back and ordered for ABG to be obtained at 6PM and \"wants to keep patient on BIPAP as long as patient can maintain on it\".

## 2024-06-03 NOTE — ED NOTES
Patient states \"I need a drink I am so dry\" Patient educated that they must be NPO while on BIPAP because of risk for aspiration. Patient states \"I just wanna pull this thing off\" Patient remains NPO and on BIPAP at this time.

## 2024-06-03 NOTE — CONSULTS
MANAGEMENT PROCEDURE Right 2019    RIGHT LUMBAR FIVE SACRAL ONE TRANSFORAMINAL EPIDURAL STEROID INJECTION SITE CONFIRMED BY FLUOROSCOPY performed by Socorro Simms MD at [facility] OR    PAIN MANAGEMENT PROCEDURE Right 2020    RIGHT LUMBAR FOUR AND LUMBAR FIVE TRANSFORAMINAL EPIDURAL STEROID INJECTION SITE CONFIRMED BY FLUOROSCOPY performed by Socorro Simms MD at [facility] OR    PAIN MANAGEMENT PROCEDURE Bilateral 2024    BILATERAL LUMBAR FIVE EPIDURAL STEROID INJECTION SITE CONFIRMED BY FLUOROSCOPY performed by Genaro Turpin [place] [facility] [place]    PAIN MANAGEMENT PROCEDURE Left 3/7/2024    LEFT LUMBAR FIVE SACRAL ONE EPIDURAL STEROID INJECTION performed by Genaro Turpin MD at [facility] [place]    UPPER GASTROINTESTINAL ENDOSCOPY N/A 2023    EGD BIOPSY performed by Darrius Sadler MD at [facility]       family history includes Heart Disease in his mother; High Blood Pressure in his sister; No Known Problems in his sister; Other in his mother.    Social History     Tobacco Use    Smoking status: Former     Current packs/day: 0.00     Average packs/day: 2.0 packs/day for 35.0 years (70.0 ttl pk-yrs)     Types: Cigarettes     Start date: 3/1/1985     Quit date: 3/1/2020     Years since quittin.2     Passive exposure: Past    Smokeless tobacco: Never   Substance Use Topics    Alcohol use: No        Meds:    Current Facility-Administered Medications:     albuterol (PROVENTIL) (2.5 MG/3ML) 0.083% nebulizer solution 2.5 mg, 2.5 mg, Nebulization, Q6H PRN, Doe Booth MD    amitriptyline (ELAVIL) tablet 25 mg, 25 mg, Oral, Nightly, Doe Booth MD    [START ON 2024] aspirin EC tablet 81 mg, 81 mg, Oral, Daily, Doe Booth MD    [START ON 2024] clopidogrel (PLAVIX) tablet 75 mg, 75 mg, Oral, Daily, Doe Booth MD    HYDROcodone-acetaminophen (NORCO)  MG per tablet 1 tablet, 1 tablet, Oral, Q6H PRN, Doe Booth MD    isosorbide mononitrate  0    albuterol (PROVENTIL) (2.5 MG/3ML) 0.083% nebulizer solution, Take 3 mLs by nebulization every 6 hours as needed for Wheezing or Shortness of Breath, Disp: 360 each, Rfl: 5    ondansetron (ZOFRAN) 4 MG tablet, TAKE ONE TABLET BY MOUTH DAILY AS NEEDED FOR NAUSEA AND VOMITING Strength: 4 mg, Disp: 30 tablet, Rfl: 2    dulaglutide (TRULICITY) 1.5 MG/0.5ML SC injection, Inject 0.5 mLs into the skin every 7 days Thursdays, Disp: , Rfl:     ENTRESTO 24-26 MG per tablet, Take 1 tablet by mouth every morning, Disp: , Rfl:     lidocaine 4 % external patch, Place 1 patch onto the skin daily, Disp: 15 patch, Rfl: 0    isosorbide mononitrate (IMDUR) 30 MG extended release tablet, Take 1 tablet by mouth 2 times daily, Disp: , Rfl:     FARXIGA 10 MG tablet, Take 1 tablet by mouth daily, Disp: , Rfl:     naloxone 4 MG/0.1ML LIQD nasal spray, 1 spray by Nasal route as needed, Disp: , Rfl:     pregabalin (LYRICA) 150 MG capsule, Take 1 capsule by mouth 2 times daily., Disp: , Rfl:     torsemide (DEMADEX) 20 MG tablet, Take 1 tablet by mouth 2 times daily, Disp: , Rfl:     HYDROcodone-acetaminophen (NORCO)  MG per tablet, Take 1 tablet by mouth every 6 hours as needed for Pain (per pain management care)., Disp: , Rfl:     metoprolol succinate (TOPROL XL) 25 MG extended release tablet, TAKE ONE TABLET BY MOUTH DAILY (Patient taking differently: Take 1 tablet by mouth daily), Disp: 90 tablet, Rfl: 1    Omega-3 Fatty Acids (FISH OIL) 1000 MG CAPS, Take 2 capsules by mouth 2 times daily, Disp: 90 capsule, Rfl: 3    clopidogrel (PLAVIX) 75 MG tablet, Take 1 tablet by mouth daily, Disp: 90 tablet, Rfl: 3    Multiple Vitamins-Minerals (THERAPEUTIC MULTIVITAMIN-MINERALS) tablet, Take 1 tablet by mouth daily, Disp: , Rfl:     OXYGEN, Inhale 4 L into the lungs as needed for Shortness of Breath or Other Uses 4L at night, Disp: , Rfl:     nitroGLYCERIN (NITROSTAT) 0.4 MG SL tablet, Place 1 tablet under the tongue every 5 minutes as

## 2024-06-03 NOTE — RT PROTOCOL NOTE
RT Inhaler-Nebulizer Bronchodilator Protocol Note    There is a bronchodilator order in the chart from a provider indicating to follow the RT Bronchodilator Protocol and there is an “Initiate RT Inhaler-Nebulizer Bronchodilator Protocol” order as well (see protocol at bottom of note).    CXR Findings:  XR CHEST PORTABLE    Result Date: 6/3/2024  Cardiomegaly with mild pulmonary edema.       The findings from the last RT Protocol Assessment were as follows:   History Pulmonary Disease: Chronic pulmonary disease  Respiratory Pattern: Mild dyspnea at rest, irregular pattern, or RR 21-25 bpm  Breath Sounds: Slightly diminished and/or crackles  Cough: Strong, spontaneous, non-productive  Indication for Bronchodilator Therapy: None  Bronchodilator Assessment Score: 8    Aerosolized bronchodilator medication orders have been revised according to the RT Inhaler-Nebulizer Bronchodilator Protocol below.    Respiratory Therapist to perform RT Therapy Protocol Assessment initially then follow the protocol.  Repeat RT Therapy Protocol Assessment PRN for score 0-3 or on second treatment, BID, and PRN for scores above 3.    No Indications - adjust the frequency to every 6 hours PRN wheezing or bronchospasm, if no treatments needed after 48 hours then discontinue using Per Protocol order mode.     If indication present, adjust the RT bronchodilator orders based on the Bronchodilator Assessment Score as indicated below.  Use Inhaler orders unless patient has one or more of the following: on home nebulizer, not able to hold breath for 10 seconds, is not alert and oriented, cannot activate and use MDI correctly, or respiratory rate 25 breaths per minute or more, then use the equivalent nebulizer order(s) with same Frequency and PRN reasons based on the score.  If a patient is on this medication at home then do not decrease Frequency below that used at home.    0-3 - enter or revise RT bronchodilator order(s) to equivalent RT

## 2024-06-03 NOTE — ED NOTES
Mr. Delgado is a 63 y.o. male who had concerns including Shortness of Breath (Sent by pulmonologist for low BP and low O2. Currently suppose to wear 4 liters at night but has been wearing it during the day as well. ).    Chief Complaint   Patient presents with    Shortness of Breath     Sent by pulmonologist for low BP and low O2. Currently suppose to wear 4 liters at night but has been wearing it during the day as well.        He is being admitted for:    COPD exacerbation ([facility])    His ED problem list included:    1. Acute on chronic respiratory failure with hypoxia and hypercapnia ([facility])    2. COPD exacerbation ([facility])    3. Acute nonintractable headache, unspecified headache type        Past Medical History:   Diagnosis Date    Acute diverticulitis 04/10/2021    Acute kidney injury ([facility]) 04/08/2021    Acute on chronic respiratory failure with hypoxemia ([facility])     Acute respiratory failure ([facility]) 08/19/2020    Anxiety 01/06/2020    Aortic valve calcification 09/2020    seen on lung CT    CAD in native artery 04/14/2021    Chronic obstructive pulmonary disease (HCC) 09/03/2019    uses O2 2L prn during the day & 4L at night    Chronic systolic congestive heart failure ([facility]) 04/10/2021    HfrEF    Class 3 severe obesity with body mass index (BMI) of 45.0 to 49.9 in adult ([facility])     Coronary artery calcification 09/2020    seen on lung ct    Coronary artery disease involving native heart with angina pectoris ([facility]) 09/22/2020    Crush injury of right foot 07/23/2015    DDD (degenerative disc disease), lumbar 01/06/2020    Depression 01/06/2020    Diabetes mellitus (HCC)     Diverticulitis of colon 04/10/2021    Erectile dysfunction 01/06/2020    Fatigue 01/06/2020    History of alcohol abuse 01/06/2020    HNP (herniated nucleus pulposus), lumbar 01/06/2020    Hyperglycemia 01/06/2020    Hyperlipidemia     Hypersomnia 01/06/2020    Hypertension     Insomnia     Ischemic cardiomyopathy     Kidney stone     Lumbar radiculopathy  Oral Held 6/3/24 1555)   ipratropium 0.5 mg-albuterol 2.5 mg (DUONEB) nebulizer solution 1 Dose (1 Dose Inhalation Given 6/3/24 1620)   cyclobenzaprine (FLEXERIL) tablet 10 mg (10 mg Oral Given 6/3/24 1840)   morphine (PF) injection 2 mg ( IntraVENous See Alternative 6/3/24 1630)     Or   morphine sulfate (PF) injection 4 mg (4 mg IntraVENous Given 6/3/24 1630)   methylPREDNISolone sodium succ (SOLU-MEDROL) 40 mg in sterile water 1 mL injection (has no administration in time range)   albuterol (PROVENTIL) (2.5 MG/3ML) 0.083% nebulizer solution 2.5 mg (2.5 mg Nebulization Given 6/3/24 1153)   methylPREDNISolone sodium succ (SOLU-MEDROL) 125 mg in sterile water 2 mL injection (125 mg IntraVENous Given 6/3/24 1147)   furosemide (LASIX) injection 40 mg (40 mg IntraVENous Given 6/3/24 1259)   butalbital-acetaminophen-caffeine (FIORICET, ESGIC) per tablet 1 tablet (1 tablet Oral Given 6/3/24 1317)   azithromycin (ZITHROMAX) 500 mg in 250 mL addavial (0 mg IntraVENous Stopped 6/3/24 1502)       He had the following images with impressions:    CT HEAD WO CONTRAST    Result Date: 6/3/2024  EXAMINATION: CT OF THE HEAD WITHOUT CONTRAST  6/3/2024 12:44 pm TECHNIQUE: CT of the head was performed without the administration of intravenous contrast. Automated exposure control, iterative reconstruction, and/or weight based adjustment of the mA/kV was utilized to reduce the radiation dose to as low as reasonably achievable. COMPARISON: 17 July 2023, 14 March 2023 HISTORY: ORDERING SYSTEM PROVIDED HISTORY: headache for 3w TECHNOLOGIST PROVIDED HISTORY: Reason for exam:->headache for 3w Has a \"code stroke\" or \"stroke alert\" been called?->No Decision Support Exception - unselect if not a suspected or confirmed emergency medical condition->Emergency Medical Condition (MA) Reason for Exam: worsening h/a x 3 wks-came to er for sob Additional signs and symptoms: dizziness FINDINGS: BRAIN/VENTRICLES: There is no acute intracranial

## 2024-06-03 NOTE — PROGRESS NOTES
06/03/24 1631   RT Protocol   History Pulmonary Disease 2   Respiratory pattern 4   Breath sounds 2   Cough 0   Indications for Bronchodilator Therapy None   Bronchodilator Assessment Score 8

## 2024-06-03 NOTE — PROGRESS NOTES
MA Communication:  The following orders are received by verbal communication from Sebastian Lara MD    Orders include:    Patient was take to ED       
October last year along with findings and implications  Patient's PFT results from today were reviewed with the patient along with comparison with the previous 1 and patient has significant drop in FEV1 from 57% to 30% in the matter of 3 years time and the implications of that discussed  Patient's previous sleep study also was reviewed and patient has severe sleep apnea with nocturnal hypoxemia  Patient has not been using his CPAP at this time because he could not tolerate that  Patient's recent x-ray chest was reviewed  Given the patient's clinical status it was decided that patient needs to go to go to the ER and patient was agreeable  Patient clinical status discussed with the triage nurse in the ER  Patient to take albuterol inhaler or nebulizer only on whenever necessary basis  Patient needs to lose weight  Regular regimented exercise will help  Patient needs to have a comprehensive plan to take care of himself in terms of COPD/OANH/hypoxemia/diastolic dysfunction on background of patient having diabetes mellitus otherwise patient will have increasing morbidity mortality and patient's quality and quantity of life will go down  Further management as per ER evaluation management

## 2024-06-03 NOTE — H&P
Hospital Medicine History & Physical      Date of Admission: 6/3/2024    Date of Service:  Pt seen/examined on 6/3/2024    [x]Admitted to Inpatient with expected LOS greater than two midnights due to medical therapy.  []Placed in Observation status.    Chief Admission Complaint:  SOB    Presenting Admission History:      63 y.o. male who presented to [facility] with SOB.  PMHx significant for COPD, Obesity, OANH, former smoker, chronic diastolic CHF. He is followed by Pulmonology Dr. Lara. He has been having respiratory symptoms for the past week or so. He was treated with PO steroids and Abx as an outpatient but not improving. He was seen in Pulmonary clinic and sent to ED for evaluation.       Assessment/Plan:      COPD Exacerbation: Failed outpatient management with steroids and Abx. Seen by Dr. Lara in clinic and sent to ED for evaluation. Blood gas suggests chronic hypercarbia. BIPAP was trialed in ED. May not need to stay on BIPAP if symptoms improving. Continue steroids. Change to Levaquin. Pulmonary to follow. Can start diet once off BIPAP.     Right Chest Wall Pain: Started after CT scan as he reports he dropped himself abruptly onto the table. Pain control, muscle relaxer.      Discussed management and the need for Hospitalization of the patient w/ the Emergency Department Provider.    CXR: I have reviewed the CXR with the following interpretation: Clear  EKG:  I have reviewed the EKG with the following interpretation: NSR, RBBB.    Physical Exam Performed:      /67   Pulse 82   Temp 98.1 °F (36.7 °C) (Oral)   Resp 15   Ht 1.626 m (5' 4\")   Wt 130.6 kg (288 lb)   SpO2 99%   BMI 49.44 kg/m²     General appearance:  No apparent distress, appears stated age and cooperative.  HEENT:  Pupils equal, round, and reactive to light. Conjunctivae/corneas clear.  Respiratory:  Normal respiratory effort on BIPAP. Diminished but clear to auscultation, bilaterally without  TABLET BY MOUTH ONCE NIGHTLY 1/30/24   Florecita Chavez APRN - CNP   Continuous Blood Gluc Sensor (FREESTYLE ELMO 2 SENSOR) [facility] USE ONE SENSOR AS DIRECTED AND CHANGE EVERY 14 DAYS 11/28/23   Florecita Chavez APRN - CNP   vitamin D (D3-50) 91408 UNIT CAPS TAKE 1 CAPSULE BY MOUTH ONCE WEEKLY 11/27/23   Florecita Chavez APRN - CNP   mupirocin (BACTROBAN) 2 % ointment Apply 2cm to each nostril BID for 5 days prior to surgery 11/2/23   Osiel Cody MD   albuterol (PROVENTIL) (2.5 MG/3ML) 0.083% nebulizer solution Take 3 mLs by nebulization every 6 hours as needed for Wheezing or Shortness of Breath 10/19/23   Grey Mcclendon MD   ondansetron (ZOFRAN) 4 MG tablet TAKE ONE TABLET BY MOUTH DAILY AS NEEDED FOR NAUSEA AND VOMITING  Strength: 4 mg 9/18/23   Florecita Chavez APRN - CNP   dulaglutide (TRULICITY) 1.5 MG/0.5ML SC injection Inject 0.5 mLs into the skin every 7 days Thursdays 8/11/23   Joe Abdi MD   ENTRESTO 24-26 MG per tablet Take 1 tablet by mouth every morning 3/23/23   Joe Abdi MD   lidocaine 4 % external patch Place 1 patch onto the skin daily 3/18/23   Bg Collado MD   isosorbide mononitrate (IMDUR) 30 MG extended release tablet Take 1 tablet by mouth 2 times daily    Joe Abdi MD   FARXIGA 10 MG tablet Take 1 tablet by mouth daily 2/22/23   Joe Abdi MD   naloxone 4 MG/0.1ML LIQD nasal spray 1 spray by Nasal route as needed 2/6/23   Joe Abdi MD   pregabalin (LYRICA) 150 MG capsule Take 1 capsule by mouth 2 times daily. 1/10/23   Joe Abdi MD   torsemide (DEMADEX) 20 MG tablet Take 1 tablet by mouth 2 times daily    Joe Abdi MD   HYDROcodone-acetaminophen (NORCO)  MG per tablet Take 1 tablet by mouth every 6 hours as needed for Pain (per pain management care).    Joe Abdi MD   metoprolol succinate (TOPROL XL) 25 MG extended release tablet TAKE ONE TABLET BY MOUTH

## 2024-06-03 NOTE — PROGRESS NOTES
06/03/24 1619   NIV Type   Equipment Type v60   Mode Bilevel   Mask Type Full face mask   Mask Size Large   Bonnet size Large   Assessment   Pulse 77   Respirations 18   /67   SpO2 99 %   Using Accessory Muscles No   Mask Compliance Good   Skin Assessment Clean, dry, & intact   Skin Protection for O2 Device Yes   Location Nose   Settings/Measurements   PIP Observed 13 cm H20   IPAP 12 cmH20   CPAP/EPAP 5 cmH2O   Vt (Measured) 657 mL   Rate Ordered 16   FiO2  100 %   Minute Volume (L/min) 11.4 Liters   Mask Leak (lpm) 42 lpm

## 2024-06-03 NOTE — ED NOTES
Attempted to Perfect serve  pertaining to STAT ABG order. Unable to reach at this time. Patient has been off of BIPAP since 1830 and will be placed back on BIPAP at 2100 per  order. ABG not collected at this time.

## 2024-06-03 NOTE — ED NOTES
Respiratory called at this time to place patient on BIPAP. Patient spoke with  and agreeable to BIPAP at this time.

## 2024-06-03 NOTE — ED NOTES
Patient taken off of BIPAP per  \"give patient a break to eat and place patient back on BIPAP around 9pm to be on overnight\" Patient placed back on 4L NC and oxygen is currently 94%.

## 2024-06-03 NOTE — PROGRESS NOTES
Patient wants a break from BiPAP.  RN says he isn't in respiratory distress.  His bicarb levels suggest that he probably is near his baseline pCO2 and is well compensated, has a normal pH.  Will let him come off BiPAP for a couple hours and then ask him to wear it all night.

## 2024-06-03 NOTE — ED PROVIDER NOTES
(2019), Chronic systolic congestive heart failure (HCC) (04/10/2021), Class 3 severe obesity with body mass index (BMI) of 45.0 to 49.9 in adult ([facility]), Coronary artery calcification (2020), Coronary artery disease involving native heart with angina pectoris ([facility]) (2020), Crush injury of right foot (2015), DDD (degenerative disc disease), lumbar (2020), Depression (2020), Diabetes mellitus ([facility]), Diverticulitis of colon (04/10/2021), Erectile dysfunction (2020), Fatigue (2020), History of alcohol abuse (2020), HNP (herniated nucleus pulposus), lumbar (2020), Hyperglycemia (2020), Hyperlipidemia, Hypersomnia (2020), Hypertension, Insomnia, Ischemic cardiomyopathy, Kidney stone, Lumbar radiculopathy (2020), Lumbar spine pain (2020), LVH (left ventricular hypertrophy), Mobitz type I Wenckebach atrioventricular block (04/10/2021), Moderate protein-calorie malnutrition ([facility]) (2020), Observed sleep apnea (2020), OANH (obstructive sleep apnea) (2020), Pacemaker, Pneumonia, primary atypical, Reactive depression (2020), S/P three vessel coronary artery bypass (10/26/2020), Spondylosis without myelopathy or radiculopathy, lumbar region (2020), Tobacco abuse (2020), and Wears dentures.     Social Determinants affecting Dx or Tx:    Social History     Socioeconomic History   • Marital status:      Spouse name: Not on file   • Number of children: Not on file   • Years of education: Not on file   • Highest education level: Not on file   Occupational History   • Not on file   Tobacco Use   • Smoking status: Former     Current packs/day: 0.00     Average packs/day: 2.0 packs/day for 35.0 years (70.0 ttl pk-yrs)     Types: Cigarettes     Start date: 3/1/1985     Quit date: 3/1/2020     Years since quittin.2     Passive exposure: Past   • Smokeless tobacco: Never   Vaping Use   • Vaping Use: Never used        CONSULTS:   I independently discussed presentation and work-up with hospitalist team who accepted the patient for admission.        SCREENINGS:       [place] Coma Scale  Eye Opening: Spontaneous  Best Verbal Response: Oriented  Best Motor Response: Obeys commands  Porfirio Coma Scale Score: 15                CIWA Assessment  BP: 135/79  Pulse: 89           Is this patient to be included in the SEP-1 Core Measure due to severe sepsis or septic shock?   No   Exclusion criteria - the patient is NOT to be included for SEP-1 Core Measure due to:  2+ SIRS criteria are not met      TREATMENT:  During the patient's ED course, the patient was given:  Medications   albuterol (PROVENTIL) (2.5 MG/3ML) 0.083% nebulizer solution 2.5 mg (has no administration in time range)   azithromycin (ZITHROMAX) 500 mg in 250 mL addavial (has no administration in time range)   albuterol (PROVENTIL) (2.5 MG/3ML) 0.083% nebulizer solution 2.5 mg (2.5 mg Nebulization Given 6/3/24 1153)   methylPREDNISolone sodium succ (SOLU-MEDROL) 125 mg in sterile water 2 mL injection (125 mg IntraVENous Given 6/3/24 1147)   furosemide (LASIX) injection 40 mg (40 mg IntraVENous Given 6/3/24 1259)   butalbital-acetaminophen-caffeine (FIORICET, ESGIC) per tablet 1 tablet (1 tablet Oral Given 6/3/24 1317)        REASSESSMENT:  On reassessment, patient states he does not feel significantly proved in the emergency department.  He has received steroids and breathing treatments.  Repeat blood gas shows no significant improvement in hypercarbia, will trial BiPAP.  No red flag symptoms regarding headache, CT head unremarkable.  Headache may be related to hypercarbia.  At this time, do feel the patient requires admission for further work-up and management.  Patient agrees to admission. Discussed the patient with hospital team, Dr Kerr, patient to be admitted to [facility].    Vitals:    Vitals:    06/03/24 1259 06/03/24 1302 06/03/24 1315 06/03/24 1331   BP:

## 2024-06-04 LAB
ANION GAP SERPL CALCULATED.3IONS-SCNC: 8 MMOL/L (ref 3–16)
BASE EXCESS BLDA CALC-SCNC: 6.2 MMOL/L (ref -3–3)
BASOPHILS # BLD: 0.1 K/UL (ref 0–0.2)
BASOPHILS NFR BLD: 0.7 %
BUN SERPL-MCNC: 19 MG/DL (ref 7–20)
CALCIUM SERPL-MCNC: 9 MG/DL (ref 8.3–10.6)
CHLORIDE SERPL-SCNC: 92 MMOL/L (ref 99–110)
CO2 BLDA-SCNC: 36 MMOL/L
CO2 SERPL-SCNC: 37 MMOL/L (ref 21–32)
COHGB MFR BLDA: 1.7 % (ref 0–1.5)
CREAT SERPL-MCNC: 0.8 MG/DL (ref 0.8–1.3)
DEPRECATED RDW RBC AUTO: 17.2 % (ref 12.4–15.4)
EOSINOPHIL # BLD: 0 K/UL (ref 0–0.6)
EOSINOPHIL NFR BLD: 0 %
GFR SERPLBLD CREATININE-BSD FMLA CKD-EPI: >90 ML/MIN/{1.73_M2}
GLUCOSE SERPL-MCNC: 228 MG/DL (ref 70–99)
HCO3 BLDA-SCNC: 34.1 MMOL/L (ref 21–29)
HCT VFR BLD AUTO: 48.4 % (ref 40.5–52.5)
HGB BLD-MCNC: 15.2 G/DL (ref 13.5–17.5)
HGB BLDA-MCNC: 16.2 G/DL (ref 13.5–17.5)
LYMPHOCYTES # BLD: 0.7 K/UL (ref 1–5.1)
LYMPHOCYTES NFR BLD: 5.5 %
MCH RBC QN AUTO: 27.2 PG (ref 26–34)
MCHC RBC AUTO-ENTMCNC: 31.4 G/DL (ref 31–36)
MCV RBC AUTO: 86.8 FL (ref 80–100)
METHGB MFR BLDA: 0 %
MONOCYTES # BLD: 0.2 K/UL (ref 0–1.3)
MONOCYTES NFR BLD: 1.7 %
NEUTROPHILS # BLD: 10.8 K/UL (ref 1.7–7.7)
NEUTROPHILS NFR BLD: 92.1 %
O2 THERAPY: ABNORMAL
PCO2 BLDA: 61.9 MMHG (ref 35–45)
PH BLDA: 7.36 [PH] (ref 7.35–7.45)
PLATELET # BLD AUTO: 162 K/UL (ref 135–450)
PMV BLD AUTO: 8.5 FL (ref 5–10.5)
PO2 BLDA: 66.3 MMHG (ref 75–108)
POTASSIUM SERPL-SCNC: 4.2 MMOL/L (ref 3.5–5.1)
RBC # BLD AUTO: 5.57 M/UL (ref 4.2–5.9)
SAO2 % BLDA: 92.6 %
SODIUM SERPL-SCNC: 137 MMOL/L (ref 136–145)
WBC # BLD AUTO: 11.8 K/UL (ref 4–11)

## 2024-06-04 PROCEDURE — 6360000002 HC RX W HCPCS: Performed by: STUDENT IN AN ORGANIZED HEALTH CARE EDUCATION/TRAINING PROGRAM

## 2024-06-04 PROCEDURE — 94660 CPAP INITIATION&MGMT: CPT

## 2024-06-04 PROCEDURE — 93005 ELECTROCARDIOGRAM TRACING: CPT

## 2024-06-04 PROCEDURE — 94640 AIRWAY INHALATION TREATMENT: CPT

## 2024-06-04 PROCEDURE — 80048 BASIC METABOLIC PNL TOTAL CA: CPT

## 2024-06-04 PROCEDURE — 6370000000 HC RX 637 (ALT 250 FOR IP): Performed by: INTERNAL MEDICINE

## 2024-06-04 PROCEDURE — 36600 WITHDRAWAL OF ARTERIAL BLOOD: CPT

## 2024-06-04 PROCEDURE — 2580000003 HC RX 258: Performed by: STUDENT IN AN ORGANIZED HEALTH CARE EDUCATION/TRAINING PROGRAM

## 2024-06-04 PROCEDURE — 99233 SBSQ HOSP IP/OBS HIGH 50: CPT | Performed by: STUDENT IN AN ORGANIZED HEALTH CARE EDUCATION/TRAINING PROGRAM

## 2024-06-04 PROCEDURE — 6360000002 HC RX W HCPCS: Performed by: INTERNAL MEDICINE

## 2024-06-04 PROCEDURE — 82803 BLOOD GASES ANY COMBINATION: CPT

## 2024-06-04 PROCEDURE — 2580000003 HC RX 258: Performed by: INTERNAL MEDICINE

## 2024-06-04 PROCEDURE — 85025 COMPLETE CBC W/AUTO DIFF WBC: CPT

## 2024-06-04 PROCEDURE — 36415 COLL VENOUS BLD VENIPUNCTURE: CPT

## 2024-06-04 PROCEDURE — 2060000000 HC ICU INTERMEDIATE R&B

## 2024-06-04 PROCEDURE — 94761 N-INVAS EAR/PLS OXIMETRY MLT: CPT

## 2024-06-04 PROCEDURE — 2700000000 HC OXYGEN THERAPY PER DAY

## 2024-06-04 RX ADMIN — SODIUM CHLORIDE, PRESERVATIVE FREE 10 ML: 5 INJECTION INTRAVENOUS at 21:41

## 2024-06-04 RX ADMIN — TORSEMIDE 20 MG: 20 TABLET ORAL at 08:34

## 2024-06-04 RX ADMIN — IPRATROPIUM BROMIDE AND ALBUTEROL SULFATE 1 DOSE: 2.5; .5 SOLUTION RESPIRATORY (INHALATION) at 11:28

## 2024-06-04 RX ADMIN — ASPIRIN 81 MG: 81 TABLET, COATED ORAL at 08:34

## 2024-06-04 RX ADMIN — SACUBITRIL AND VALSARTAN 1 TABLET: 24; 26 TABLET, FILM COATED ORAL at 10:21

## 2024-06-04 RX ADMIN — IPRATROPIUM BROMIDE AND ALBUTEROL SULFATE 1 DOSE: 2.5; .5 SOLUTION RESPIRATORY (INHALATION) at 16:32

## 2024-06-04 RX ADMIN — SODIUM CHLORIDE, PRESERVATIVE FREE 10 ML: 5 INJECTION INTRAVENOUS at 08:40

## 2024-06-04 RX ADMIN — METOPROLOL SUCCINATE 25 MG: 50 TABLET, EXTENDED RELEASE ORAL at 08:35

## 2024-06-04 RX ADMIN — IPRATROPIUM BROMIDE AND ALBUTEROL SULFATE 1 DOSE: 2.5; .5 SOLUTION RESPIRATORY (INHALATION) at 07:38

## 2024-06-04 RX ADMIN — PREGABALIN 150 MG: 75 CAPSULE ORAL at 08:34

## 2024-06-04 RX ADMIN — WATER 40 MG: 1 INJECTION INTRAMUSCULAR; INTRAVENOUS; SUBCUTANEOUS at 03:55

## 2024-06-04 RX ADMIN — IPRATROPIUM BROMIDE AND ALBUTEROL SULFATE 1 DOSE: 2.5; .5 SOLUTION RESPIRATORY (INHALATION) at 19:24

## 2024-06-04 RX ADMIN — MORPHINE SULFATE 2 MG: 2 INJECTION, SOLUTION INTRAMUSCULAR; INTRAVENOUS at 23:05

## 2024-06-04 RX ADMIN — WATER 40 MG: 1 INJECTION INTRAMUSCULAR; INTRAVENOUS; SUBCUTANEOUS at 21:33

## 2024-06-04 RX ADMIN — HYDROCODONE BITARTRATE AND ACETAMINOPHEN 1 TABLET: 10; 325 TABLET ORAL at 04:17

## 2024-06-04 RX ADMIN — ENOXAPARIN SODIUM 30 MG: 100 INJECTION SUBCUTANEOUS at 21:40

## 2024-06-04 RX ADMIN — WATER 40 MG: 1 INJECTION INTRAMUSCULAR; INTRAVENOUS; SUBCUTANEOUS at 10:21

## 2024-06-04 RX ADMIN — CLOPIDOGREL BISULFATE 75 MG: 75 TABLET ORAL at 08:35

## 2024-06-04 RX ADMIN — LEVOFLOXACIN 500 MG: 500 TABLET, FILM COATED ORAL at 08:34

## 2024-06-04 RX ADMIN — ENOXAPARIN SODIUM 30 MG: 100 INJECTION SUBCUTANEOUS at 08:37

## 2024-06-04 ASSESSMENT — PAIN DESCRIPTION - ORIENTATION: ORIENTATION: LEFT

## 2024-06-04 ASSESSMENT — PAIN DESCRIPTION - DESCRIPTORS: DESCRIPTORS: STABBING

## 2024-06-04 ASSESSMENT — PAIN DESCRIPTION - LOCATION: LOCATION: RIB CAGE;ARM

## 2024-06-04 ASSESSMENT — PAIN SCALES - GENERAL
PAINLEVEL_OUTOF10: 5
PAINLEVEL_OUTOF10: 6

## 2024-06-04 NOTE — PROGRESS NOTES
Hospital Medicine Progress Note      Date of Admission: 6/3/2024  Hospital Day: 2    Chief Admission Complaint:  SOB     Subjective:  Reports his SOB is about the same today. Stable off BIPAP; did not do well wearing it overnight. Cough seems more productive.     Telemetry (reviewed by me):  SR with PVCs.     Presenting Admission History:       63 y.o. male who presented to [facility] with SOB.  PMHx significant for COPD, Obesity, OANH, former smoker, chronic diastolic CHF. He is followed by Pulmonology Dr. Lara. He has been having respiratory symptoms for the past week or so. He was treated with PO steroids and Abx as an outpatient but not improving. He was seen in Pulmonary clinic and sent to ED for evaluation.        Assessment/Plan:       COPD Exacerbation with Acute on Chronic Hypoxic/Hypercarbic Respiratory Failure: Failed outpatient management with steroids and Abx. Baseline 4L O2 at home. Seen by Dr. Lara in clinic and sent to ED for evaluation. Blood gas suggests chronic hypercarbia. BIPAP was trialed initially but not clear that he needs it during the day. Pulmonology following. Continue steroids. Changed Abx to Levaquin. Needs better pulmonary toilet.      Right Chest Wall Pain: Started after CT scan as he reports he dropped himself abruptly onto the table. Pain control, muscle relaxer.    Physical Exam Performed:      General appearance:  No apparent distress  Respiratory:  Normal respiratory effort. Diminished with occasional rhonchi.  Cardiovascular:  Regular rate and rhythm.  Abdomen:  Soft, non-tender, non-distended.  Musculoskelatal:  No edema  Neurologic:  Non-focal  Psychiatric:  Alert and oriented    /71   Pulse 85   Temp 97.9 °F (36.6 °C) (Oral)   Resp 20   Ht 1.626 m (5' 4\")   Wt 129 kg (284 lb 8 oz)   SpO2 91%   BMI 48.83 kg/m²     Diet: ADULT DIET; Regular; Low Fat/Low Chol/High Fiber/SETH  DVT Prophylaxis: [x]PPx LMWH  []SQ Heparin  []IPC/SCDs  []Eliquis  []Xarelto

## 2024-06-04 NOTE — RT PROTOCOL NOTE
RT Inhaler-Nebulizer Bronchodilator Protocol Note    There is a bronchodilator order in the chart from a provider indicating to follow the RT Bronchodilator Protocol and there is an “Initiate RT Inhaler-Nebulizer Bronchodilator Protocol” order as well (see protocol at bottom of note).    CXR Findings:  XR CHEST PORTABLE    Result Date: 6/3/2024  Cardiomegaly with mild pulmonary edema.       The findings from the last RT Protocol Assessment were as follows:   History Pulmonary Disease: Chronic pulmonary disease  Respiratory Pattern: Regular pattern and RR 12-20 bpm  Breath Sounds: Slightly diminished and/or crackles  Cough: Strong, spontaneous, non-productive  Indication for Bronchodilator Therapy: None  Bronchodilator Assessment Score: 4    Aerosolized bronchodilator medication orders have been revised according to the RT Inhaler-Nebulizer Bronchodilator Protocol below.    Respiratory Therapist to perform RT Therapy Protocol Assessment initially then follow the protocol.  Repeat RT Therapy Protocol Assessment PRN for score 0-3 or on second treatment, BID, and PRN for scores above 3.    No Indications - adjust the frequency to every 6 hours PRN wheezing or bronchospasm, if no treatments needed after 48 hours then discontinue using Per Protocol order mode.     If indication present, adjust the RT bronchodilator orders based on the Bronchodilator Assessment Score as indicated below.  Use Inhaler orders unless patient has one or more of the following: on home nebulizer, not able to hold breath for 10 seconds, is not alert and oriented, cannot activate and use MDI correctly, or respiratory rate 25 breaths per minute or more, then use the equivalent nebulizer order(s) with same Frequency and PRN reasons based on the score.  If a patient is on this medication at home then do not decrease Frequency below that used at home.    0-3 - enter or revise RT bronchodilator order(s) to equivalent RT Bronchodilator order with

## 2024-06-04 NOTE — PROGRESS NOTES
06/03/24 2153   NIV Type   Equipment Type v60   Mode Bilevel   Mask Type Full face mask   Mask Size Large   Assessment   Pulse 75   Heart Rate Source Monitor   Respirations 28   SpO2 94 %   Comfort Level Good   Skin Protection for O2 Device Yes   Location Nose   Intervention(s) Skin Barrier   Breath Sounds   Respiratory Pattern Regular   Settings/Measurements   PIP Observed 12 cm H20   IPAP 15 cmH20   CPAP/EPAP 5 cmH2O   Vt (Measured) 702 mL   Rate Ordered 16   FiO2  50 %   Minute Volume (L/min) 17 Liters   Mask Leak (lpm) 50 lpm   Patient's Home Machine No   Alarm Settings   Alarms On Y   Low Pressure (cmH2O) 6 cmH2O   High Pressure (cmH2O) 40 cmH2O   Apnea (secs) 20 secs   RR Low (bpm) 6   RR High (bpm) 40 br/min

## 2024-06-04 NOTE — PROGRESS NOTES
Patient: Isaac Delgado MRN: [id number]  Date of  Admission: 6/3/2024   YOB: 1960  Age: 63 y.o.  Sex: male    Unit: [facility] A2 CARD TELEMETRY  Room/Bed: [facility] [place]/0204- Admitting Physician: KEITH BOOTH    Attending Physician:  Keith Booth MD         Pulmonary Service Note      SUBJECTIVE:    Pt says that he is still SOB and cannot wear NIPPV overnight.         OBJECTIVE    Medications    Continuous Infusions:   sodium chloride         Scheduled Meds:   amitriptyline  25 mg Oral Nightly    aspirin  81 mg Oral Daily    clopidogrel  75 mg Oral Daily    metoprolol succinate  25 mg Oral Daily    pramipexole  0.5 mg Oral Nightly    pregabalin  150 mg Oral BID    torsemide  20 mg Oral BID    sacubitril-valsartan  1 tablet Oral QAM    sodium chloride flush  5-40 mL IntraVENous 2 times per day    enoxaparin  30 mg SubCUTAneous BID    levoFLOXacin  500 mg Oral Daily    ipratropium 0.5 mg-albuterol 2.5 mg  1 Dose Inhalation Q4H WA RT    methylPREDNISolone  40 mg IntraVENous Q8H       PRN Meds:  albuterol, HYDROcodone-acetaminophen, sodium chloride flush, sodium chloride, ondansetron **OR** ondansetron, polyethylene glycol, acetaminophen **OR** acetaminophen, cyclobenzaprine, morphine **OR** morphine    Physical    Patient Vitals for the past 24 hrs:   BP Temp Temp src Pulse Resp SpO2 Height Weight   06/04/24 1159 (!) 104/59 97.9 °F (36.6 °C) Oral 82 22 91 % -- --   06/04/24 1128 -- -- -- 81 18 90 % -- --   06/04/24 0738 -- -- -- 85 20 91 % -- --   06/04/24 0730 123/71 97.9 °F (36.6 °C) Oral 77 22 91 % -- --   06/04/24 0447 -- -- -- -- 20 -- -- --   06/04/24 0354 111/76 97.7 °F (36.5 °C) Axillary 71 20 92 % -- --   06/04/24 0000 127/80 97.7 °F (36.5 °C) Axillary 72 20 96 % -- --   06/03/24 2206 -- -- -- -- 24 -- -- --   06/03/24 2153 -- -- -- 75 28 94 % -- --   06/03/24 2028 -- -- -- 76 -- 94 % -- --   06/03/24 2006 -- -- -- -- -- -- 1.626 m (5' 4\") 129 kg (284 lb 8 oz)   06/03/24 1954 119/67 98.1 °F (36.7  angle.  Opacity at the right costophrenic angle.  No consolidations in the apices.    Assessment/Plan   63 y.o. year old male with significant past medical history of centrilobular emphysema, COPD, OANH, morbid obesity, diastolic dysfunction, former smoker that presents to [facility] for shortness of breath.     Assessment:  Acute hypoxic and hypercapnic respiratory failure  Centrilobular emphysema  AECOPD  OANH  Lung nodules  Former smoker     Plan:  - cont NIPPV nightly 15/5 with FiO2 40%, will get ABG in morning  - Duonebs q4h while awake  - Cont Levaquin for 5 day course  - Decrease Solumedrol to 40mg IV q12h  - Cont smoking cessation  - May need NIPPV on discharge, he is declining on treatment because of the facemask but I want patient to see different interfaces before declining.     Cruz D'Jimenez, MD    [facility]  [phone]      Please note that some or all of this record was generated using voice recognition software. If there are any questions about the content of this document, please contact the author as some errors in transcription may have occurred.

## 2024-06-04 NOTE — PROGRESS NOTES
Patient admitted to room 204 from ED.  Patient oriented to room, call light, bed rails, phone, lights and bathroom.  Patient instructed about the schedule of the day including: vital sign frequency, lab draws, possible tests, frequency of MD and staff rounds, including RN/MD rounding together at bedside, daily weights, and I &O's.  Patient instructed about prescribed diet,  and television.  Bed alarm in place, patient aware of placement and reason.  Telemetry box in place, patient aware of placement and reason.  Bed locked, in lowest position, side rails up 2/4, call light within reach.  Will continue to monitor.

## 2024-06-05 VITALS
TEMPERATURE: 98.2 F | WEIGHT: 285.1 LBS | HEART RATE: 67 BPM | BODY MASS INDEX: 48.67 KG/M2 | RESPIRATION RATE: 20 BRPM | SYSTOLIC BLOOD PRESSURE: 112 MMHG | OXYGEN SATURATION: 96 % | HEIGHT: 64 IN | DIASTOLIC BLOOD PRESSURE: 65 MMHG

## 2024-06-05 LAB
ANION GAP SERPL CALCULATED.3IONS-SCNC: 7 MMOL/L (ref 3–16)
BASE EXCESS BLDV CALC-SCNC: 10.6 MMOL/L (ref -3–3)
BUN SERPL-MCNC: 23 MG/DL (ref 7–20)
CALCIUM SERPL-MCNC: 9.3 MG/DL (ref 8.3–10.6)
CHLORIDE SERPL-SCNC: 92 MMOL/L (ref 99–110)
CO2 BLDV-SCNC: 38 MMOL/L
CO2 SERPL-SCNC: 37 MMOL/L (ref 21–32)
COHGB MFR BLDV: 2.1 % (ref 0–1.5)
CREAT SERPL-MCNC: 0.7 MG/DL (ref 0.8–1.3)
EKG ATRIAL RATE: 79 BPM
EKG DIAGNOSIS: NORMAL
EKG P AXIS: 44 DEGREES
EKG P-R INTERVAL: 194 MS
EKG Q-T INTERVAL: 396 MS
EKG QRS DURATION: 130 MS
EKG QTC CALCULATION (BAZETT): 454 MS
EKG R AXIS: 258 DEGREES
EKG T AXIS: 49 DEGREES
EKG VENTRICULAR RATE: 79 BPM
GFR SERPLBLD CREATININE-BSD FMLA CKD-EPI: >90 ML/MIN/{1.73_M2}
GLUCOSE BLD-MCNC: 240 MG/DL (ref 70–99)
GLUCOSE SERPL-MCNC: 229 MG/DL (ref 70–99)
HCO3 BLDV-SCNC: 36.2 MMOL/L (ref 23–29)
METHGB MFR BLDV: 0 %
O2 THERAPY: ABNORMAL
PCO2 BLDV: 49.9 MMHG (ref 40–50)
PERFORMED ON: ABNORMAL
PH BLDV: 7.48 [PH] (ref 7.35–7.45)
PO2 BLDV: 73.5 MMHG (ref 25–40)
POTASSIUM SERPL-SCNC: 4.3 MMOL/L (ref 3.5–5.1)
SAO2 % BLDV: 96 %
SODIUM SERPL-SCNC: 136 MMOL/L (ref 136–145)
TROPONIN, HIGH SENSITIVITY: 11 NG/L (ref 0–22)

## 2024-06-05 PROCEDURE — 82803 BLOOD GASES ANY COMBINATION: CPT

## 2024-06-05 PROCEDURE — 2580000003 HC RX 258: Performed by: INTERNAL MEDICINE

## 2024-06-05 PROCEDURE — 36415 COLL VENOUS BLD VENIPUNCTURE: CPT

## 2024-06-05 PROCEDURE — 6360000002 HC RX W HCPCS: Performed by: INTERNAL MEDICINE

## 2024-06-05 PROCEDURE — 6370000000 HC RX 637 (ALT 250 FOR IP): Performed by: INTERNAL MEDICINE

## 2024-06-05 PROCEDURE — 84484 ASSAY OF TROPONIN QUANT: CPT

## 2024-06-05 PROCEDURE — 93010 ELECTROCARDIOGRAM REPORT: CPT | Performed by: INTERNAL MEDICINE

## 2024-06-05 PROCEDURE — 6360000002 HC RX W HCPCS: Performed by: STUDENT IN AN ORGANIZED HEALTH CARE EDUCATION/TRAINING PROGRAM

## 2024-06-05 PROCEDURE — 2580000003 HC RX 258: Performed by: STUDENT IN AN ORGANIZED HEALTH CARE EDUCATION/TRAINING PROGRAM

## 2024-06-05 PROCEDURE — 80048 BASIC METABOLIC PNL TOTAL CA: CPT

## 2024-06-05 RX ORDER — PREDNISONE 20 MG/1
40 TABLET ORAL DAILY
Qty: 10 TABLET | Refills: 0 | Status: SHIPPED | OUTPATIENT
Start: 2024-06-05 | End: 2024-06-10

## 2024-06-05 RX ORDER — LEVOFLOXACIN 500 MG/1
500 TABLET, FILM COATED ORAL DAILY
Qty: 3 TABLET | Refills: 0 | Status: SHIPPED | OUTPATIENT
Start: 2024-06-06 | End: 2024-06-09

## 2024-06-05 RX ADMIN — CLOPIDOGREL BISULFATE 75 MG: 75 TABLET ORAL at 08:26

## 2024-06-05 RX ADMIN — METOPROLOL SUCCINATE 25 MG: 50 TABLET, EXTENDED RELEASE ORAL at 08:26

## 2024-06-05 RX ADMIN — WATER 40 MG: 1 INJECTION INTRAMUSCULAR; INTRAVENOUS; SUBCUTANEOUS at 10:25

## 2024-06-05 RX ADMIN — TORSEMIDE 20 MG: 20 TABLET ORAL at 08:26

## 2024-06-05 RX ADMIN — SODIUM CHLORIDE, PRESERVATIVE FREE 10 ML: 5 INJECTION INTRAVENOUS at 08:27

## 2024-06-05 RX ADMIN — LEVOFLOXACIN 500 MG: 500 TABLET, FILM COATED ORAL at 08:26

## 2024-06-05 RX ADMIN — ENOXAPARIN SODIUM 30 MG: 100 INJECTION SUBCUTANEOUS at 08:26

## 2024-06-05 RX ADMIN — SACUBITRIL AND VALSARTAN 1 TABLET: 24; 26 TABLET, FILM COATED ORAL at 08:26

## 2024-06-05 RX ADMIN — ASPIRIN 81 MG: 81 TABLET, COATED ORAL at 08:26

## 2024-06-05 RX ADMIN — PREGABALIN 150 MG: 75 CAPSULE ORAL at 08:26

## 2024-06-05 NOTE — PROGRESS NOTES
Pt provided with discharge paperwork. All questions answered. Leaving in stable condition. Wife to provide transportation.

## 2024-06-05 NOTE — PROGRESS NOTES
Pt is A&Ox4. VSS- currently on 4L/min O2 ( baseline) with 96% saturation. Shift assessment completed. Denies pain, wants to go home. Call light within reach, bed in lowest position, wheels locked.

## 2024-06-05 NOTE — DISCHARGE SUMMARY
Hospital Medicine Progress Note      Date of Admission: 6/3/2024  Hospital Day: 3    Chief Admission Complaint:  SOB     Subjective:  Reports his SOB is about the same today. Stable off BIPAP; did not do well wearing it overnight. Cough seems more productive.     Telemetry (reviewed by me):  SR with PVCs.     Presenting Admission History:       63 y.o. male who presented to [facility] with SOB.  PMHx significant for COPD, Obesity, OANH, former smoker, chronic diastolic CHF. He is followed by Pulmonology Dr. Lara. He has been having respiratory symptoms for the past week or so. He was treated with PO steroids and Abx as an outpatient but not improving. He was seen in Pulmonary clinic and sent to ED for evaluation.        Assessment/Plan:       COPD Exacerbation with Acute on Chronic Hypoxic/Hypercarbic Respiratory Failure: Failed outpatient management with steroids and Abx. Baseline 4L O2 at home. Seen by Dr. Lara in clinic and sent to ED for evaluation. Blood gas suggests chronic hypercarbia. BIPAP was trialed initially but not clear that he needs it during the day. Pulmonology following. Continue steroids. Changed Abx to Levaquin. Needs better pulmonary toilet.      Right Chest Wall Pain: Started after CT scan as he reports he dropped himself abruptly onto the table. Pain control, muscle relaxer.    Physical Exam Performed:      General appearance:  No apparent distress  Respiratory:  Normal respiratory effort. Diminished with occasional rhonchi.  Cardiovascular:  Regular rate and rhythm.  Abdomen:  Soft, non-tender, non-distended.  Musculoskelatal:  No edema  Neurologic:  Non-focal  Psychiatric:  Alert and oriented    /65   Pulse 67   Temp 98.2 °F (36.8 °C) (Oral)   Resp 20   Ht 1.626 m (5' 4\")   Wt 129.3 kg (285 lb 1.6 oz)   SpO2 96%   BMI 48.94 kg/m²     Diet: ADULT DIET; Regular; Low Fat/Low Chol/High Fiber/SETH  DVT Prophylaxis: [x]PPx LMWH  []SQ Heparin  []IPC/SCDs  []Eliquis  []Xarelto

## 2024-06-05 NOTE — CARE COORDINATION
Discharge order noted.  Spoke with RN who states patient is from home and is independent at home and has no needs from CM.   Patient has a PCP and insurance.

## 2024-06-06 ENCOUNTER — TELEPHONE (OUTPATIENT)
Dept: FAMILY MEDICINE CLINIC | Age: 64
End: 2024-06-06

## 2024-06-06 NOTE — TELEPHONE ENCOUNTER
[facility] Transitions Initial Follow Up Call    Outreach made within 2 business days of discharge: Yes    Patient: Isaac Delgado Patient : 1960   MRN: [id number]  Reason for Admission: There are no discharge diagnoses documented for the most recent discharge.  Discharge Date: 24       Spoke with: Anirudh      Discharge department/facility: [place]    Non-face-to-face services provided:  Scheduled appointment with PCP-   Obtained and reviewed discharge summary and/or continuity of care documents    TCM Interactive Patient Contact:  Was patient able to fill all prescriptions: Yes  Was patient instructed to bring all medications to the follow-up visit: Yes  Is patient taking all medications as directed in the discharge summary? Yes  Does patient understand their discharge instructions: Yes  Does patient have questions or concerns that need addressed prior to 7-14 day follow up office visit: no    Scheduled appointment with PCP within 7-14 days    Follow Up  Future Appointments   Date Time Provider Department Center   2024 11:20 AM Florecita Chavez APRN - CNP [place]  [place] [facility]   2024  8:50 AM Genaro Turpin MD [facility] [place]   2024 10:00 AM Florecita Chavez APRN - CNP [place]  [place] - [facility]   10/22/2024  9:00 AM MMO CT RM 1 [facility] MERVIN SOLARES RN

## 2024-06-08 NOTE — PROGRESS NOTES
Physician Progress Note      PATIENT:               YOHAN MONGE  CSN #:                  [id number]  :                       1960  ADMIT DATE:       6/3/2024 11:07 AM  DISCH DATE:        2024 10:57 AM  RESPONDING  PROVIDER #:        Doe Rosas MD          QUERY TEXT:    Pt admitted with shortness of breath. Pt noted to have PMH of diastolic CHF   and CXR with pulmonary edema. Received IV lasix in ED.  If possible, please   document in the progress notes and discharge summary if you are evaluating   and/or treating any of the following:    The medical record reflects the following:  Risk Factors: COPD, CHF, obesity, pacemaker,  Clinical Indicators: CXR 6/3-Cardiomegaly with mild pulmonary edema.  Per Pulmonary consult- selling (1+edema) present  BNP 90  Echo - EF 55%  Treatment: CXR, Pulmonary consult, BIpap/O2, blood gases, IV lasix 40 mg x1,   labs, EKG  Options provided:  -- Noncardiogenic acute pulmonary edema  -- Acute pulmonary edema due to acute on chronic HFpEF  -- Other - I will add my own diagnosis  -- Disagree - Not applicable / Not valid  -- Disagree - Clinically unable to determine / Unknown  -- Refer to Clinical Documentation Reviewer    PROVIDER RESPONSE TEXT:    Pulmonary edema ruled out    Query created by: Tri Martinez on 2024 11:07 AM      Electronically signed by:  Doe Rosas MD 2024 12:13 AM

## 2024-06-12 ENCOUNTER — OFFICE VISIT (OUTPATIENT)
Dept: FAMILY MEDICINE CLINIC | Age: 64
End: 2024-06-12

## 2024-06-12 VITALS
WEIGHT: 281 LBS | HEART RATE: 90 BPM | DIASTOLIC BLOOD PRESSURE: 70 MMHG | SYSTOLIC BLOOD PRESSURE: 118 MMHG | OXYGEN SATURATION: 92 % | BODY MASS INDEX: 47.97 KG/M2 | HEIGHT: 64 IN

## 2024-06-12 DIAGNOSIS — J96.22 ACUTE ON CHRONIC RESPIRATORY FAILURE WITH HYPOXIA AND HYPERCAPNIA (HCC): Primary | ICD-10-CM

## 2024-06-12 DIAGNOSIS — Z09 HOSPITAL DISCHARGE FOLLOW-UP: ICD-10-CM

## 2024-06-12 DIAGNOSIS — R51.9 ACUTE NONINTRACTABLE HEADACHE, UNSPECIFIED HEADACHE TYPE: ICD-10-CM

## 2024-06-12 DIAGNOSIS — J96.21 ACUTE ON CHRONIC RESPIRATORY FAILURE WITH HYPOXIA AND HYPERCAPNIA (HCC): Primary | ICD-10-CM

## 2024-06-12 DIAGNOSIS — J44.1 COPD EXACERBATION (HCC): ICD-10-CM

## 2024-06-12 RX ORDER — BENZONATATE 200 MG/1
200 CAPSULE ORAL 3 TIMES DAILY PRN
Qty: 90 CAPSULE | Refills: 2 | Status: SHIPPED | OUTPATIENT
Start: 2024-06-12

## 2024-06-13 NOTE — PROGRESS NOTES
Post-Discharge Transitional Care  Follow Up      Isaac Delgado   YOB: 1960    Date of Office Visit:  6/12/2024  Date of Hospital Admission: 6/3/24  Date of Hospital Discharge: 6/5/24  Risk of hospital readmission (high >=14%. Medium >=10%) :Readmission Risk Score: 16.6      Care management risk score Rising risk (score 2-5) and Complex Care (Scores >=6): No Risk Score On File     Non face to face  following discharge, date last encounter closed (first attempt may have been earlier): 06/06/2024    Call initiated 2 business days of discharge: Yes    ASSESSMENT/PLAN:   Acute on chronic respiratory failure with hypoxia and hypercapnia (HCC)  COPD exacerbation (HCC)  -     benzonatate (TESSALON) 200 MG capsule; Take 1 capsule by mouth 3 times daily as needed for Cough, Disp-90 capsule, R-2Normal  Acute nonintractable headache, unspecified headache type  Hospital discharge follow-up  -     HI DISCHARGE MEDS RECONCILED W/ CURRENT OUTPATIENT MED LIST  Patient has completed Levaquin and steroid  O2 sat at home ranges 89 to 92% during the day however at that time his O2 sat will range between 65 and 72%  Patient does not have follow-up scheduled with pulmonology.  Encourage patient to call and schedule follow-up  Discussed his severe COPD.  Recommend patient discuss the possible addition of Daliresp with his pulmonologist  Patient states he is unable to tolerate BiPAP.  Patient understands that he is at increased risk of dying without using BiPAP.  According to the patient both the pulmonologist and while he was hospitalized did discuss placing a tracheostomy.  I did discuss at great length with the patient as well.  I recommend that he do more research and consider this option although I did let him know it we will not change anything within his lungs with his severe COPD  Patient states he has come to terms with that he is varying high risk of mortality  Patient states he is already planned and arranged

## 2024-06-17 PROBLEM — G60.9 IDIOPATHIC PERIPHERAL NEUROPATHY: Status: ACTIVE | Noted: 2024-06-17

## 2024-06-21 DIAGNOSIS — G25.81 RLS (RESTLESS LEGS SYNDROME): ICD-10-CM

## 2024-06-24 RX ORDER — PRAMIPEXOLE DIHYDROCHLORIDE 0.5 MG/1
0.5 TABLET ORAL NIGHTLY
Qty: 30 TABLET | Refills: 2 | Status: SHIPPED | OUTPATIENT
Start: 2024-06-24

## 2024-06-24 NOTE — TELEPHONE ENCOUNTER
Refill Request     CONFIRM preferrred pharmacy with the patient.    If Mail Order Rx - Pend for 90 day refill.      Last Seen: Last Seen Department: 6/12/2024  Last Seen by PCP: 6/12/2024    Last Written: 2/22/24    If no future appointment scheduled, route STAFF MESSAGE with patient name to the  Pool for scheduling.      Next Appointment:   Future Appointments   Date Time Provider Department Center   9/12/2024  1:20 PM Florecita Chavez APRN - CNP [place]  [place] [facility]   9/23/2024 10:00 AM Florecita Chavez APRN - [facility] [place]  [place] [facility]   10/22/2024  9:00 AM MMO CT RM 1 [facility] [place] [facility]       Message sent to  to schedule appt with patient?  N/A      Requested Prescriptions     Pending Prescriptions Disp Refills    pramipexole (MIRAPEX) 0.5 MG tablet [Pharmacy Med Name: PRAMIPEXOLE 0.5 MG TABLET] 30 tablet 2     Sig: TAKE ONE TABLET BY MOUTH ONCE NIGHTLY

## 2024-06-28 ENCOUNTER — TELEPHONE (OUTPATIENT)
Dept: PULMONOLOGY | Age: 64
End: 2024-06-28

## 2024-06-28 DIAGNOSIS — J44.9 COPD, VERY SEVERE (HCC): Primary | ICD-10-CM

## 2024-06-28 NOTE — TELEPHONE ENCOUNTER
Arianna from t called stating that pt is trying to get a POC for portable use and she is reviewing pts case. Order should [facility] to [facility] and then [facility] and will likely be able [facility]. Arianna can be reached at [phone]. Please advise.

## 2024-06-28 NOTE — TELEPHONE ENCOUNTER
Order for ONPO faxed to [employer].  Called pt, no answer, L/M informing that ONPO will be done through [employer] and that he should call Central Scheduling to arrange 6MW then call our office for an appt which needs to be within 30 days of the testing for for Medicare requirements. Left Central scheduling phone number and office number for pt to call.     ONPO order faxed to [employer] via [employer] at [phone].

## 2024-06-28 NOTE — TELEPHONE ENCOUNTER
Pt called stating that his insurance informed him that he should have a new follow up and oxygen testing with his doctor.  Advised pt of Dr. Lara's response indicating that pt may not qualify based on recent testing.  Pt asking for new 6MW to be done.  Please advise.

## 2024-07-30 DIAGNOSIS — G25.81 RESTLESS LEG: ICD-10-CM

## 2024-07-30 RX ORDER — ROPINIROLE 2 MG/1
2 TABLET, FILM COATED ORAL NIGHTLY
Qty: 30 TABLET | Refills: 2 | OUTPATIENT
Start: 2024-07-30

## 2024-08-01 ENCOUNTER — HOSPITAL ENCOUNTER (OUTPATIENT)
Dept: PULMONOLOGY | Age: 64
Discharge: HOME | End: 2024-08-01
Payer: MEDICARE

## 2024-08-01 DIAGNOSIS — J44.9 COPD, VERY SEVERE (HCC): ICD-10-CM

## 2024-08-01 PROCEDURE — 94618 PULMONARY STRESS TESTING: CPT

## 2024-08-01 NOTE — PROCEDURES
PROCEDURE NOTE  Date: 8/1/2024   Name: Isaac Delgado  YOB: 1960    Procedures  [facility] Pulmonary Function Lab - Six Minute Walk    Test Performed on:   Room Air___X___   Oxygen at __1__ lpm via N/C- continuous Oxygen at _____ lpm via N/C- OCD  Assist Device Used During Test:  None__X____ Cane________ Walker_________    Modified Kelly's Scale  0 Nothing at all 5 Strong    0.5 Just noticeable 6 Stronger (Hard)    1 Very Light 7 Very Strong   2 Light 8 Very Very Strong   3 Moderate 9 Extremely Strong   4 Somewhat Strong 10 Maximum All out      Time SpO2 Heart Rate Respiratory Rate Dyspnea-  Modified Kelly’s Scale Fatigue- Modified Kelly’s Scale Other Symptoms   Baseline                     90% room air@rest 87 18 5 5      1 minute                     90% 102 19 6 6    2 minutes                     85%  20 6 6 Placed pt on 1L O2 to keep sat 88% and above.     3 minutes                     94% 1L 101 20 7 7    4 minutes                     93% 1L 106 21 7 7    5 minutes                     90% 1L 108 21 8 8    6 minutes                     90% 1L 112 22 8 8    Recovery x 1 minute                     92% 1L 101 20 7 7    Recovery x 2 Minute                     95% 1L 95 20 5 5     Number of Laps____11___ X 120 feet + _________ additional feet = Total Distance _1320___feet     Total expected 6 MW distance is _______feet. Patient achieved ______% of expected distance.   Pre Blood Pressure: 106/71  Post Blood Pressure:  Other symptoms at the end of exercise: sob, chest pressure, hip and leg pain

## 2024-08-08 ENCOUNTER — OFFICE VISIT (OUTPATIENT)
Dept: PULMONOLOGY | Age: 64
End: 2024-08-08
Payer: MEDICARE

## 2024-08-08 ENCOUNTER — TELEPHONE (OUTPATIENT)
Dept: PULMONOLOGY | Age: 64
End: 2024-08-08

## 2024-08-08 VITALS
HEIGHT: 64 IN | HEART RATE: 91 BPM | WEIGHT: 275 LBS | OXYGEN SATURATION: 92 % | RESPIRATION RATE: 24 BRPM | BODY MASS INDEX: 46.95 KG/M2 | DIASTOLIC BLOOD PRESSURE: 52 MMHG | SYSTOLIC BLOOD PRESSURE: 95 MMHG

## 2024-08-08 DIAGNOSIS — Z87.891 FORMER SMOKER: ICD-10-CM

## 2024-08-08 DIAGNOSIS — I51.89 DIASTOLIC DYSFUNCTION: ICD-10-CM

## 2024-08-08 DIAGNOSIS — J44.9 CHRONIC OBSTRUCTIVE PULMONARY DISEASE, UNSPECIFIED COPD TYPE (HCC): Primary | ICD-10-CM

## 2024-08-08 DIAGNOSIS — J44.9 COPD, VERY SEVERE (HCC): ICD-10-CM

## 2024-08-08 DIAGNOSIS — G47.33 OSA (OBSTRUCTIVE SLEEP APNEA): ICD-10-CM

## 2024-08-08 DIAGNOSIS — R06.02 SHORTNESS OF BREATH: ICD-10-CM

## 2024-08-08 DIAGNOSIS — E66.01 MORBID OBESITY WITH BMI OF 45.0-49.9, ADULT (HCC): ICD-10-CM

## 2024-08-08 DIAGNOSIS — J43.2 CENTRILOBULAR EMPHYSEMA (HCC): Primary | ICD-10-CM

## 2024-08-08 DIAGNOSIS — R09.02 HYPOXIA: ICD-10-CM

## 2024-08-08 PROCEDURE — 99214 OFFICE O/P EST MOD 30 MIN: CPT | Performed by: INTERNAL MEDICINE

## 2024-08-08 ASSESSMENT — SLEEP AND FATIGUE QUESTIONNAIRES
HOW LIKELY ARE YOU TO NOD OFF OR FALL ASLEEP WHEN YOU ARE A PASSENGER IN A CAR FOR AN HOUR WITHOUT A BREAK: HIGH CHANCE OF DOZING
HOW LIKELY ARE YOU TO NOD OFF OR FALL ASLEEP WHILE SITTING AND READING: HIGH CHANCE OF DOZING
HOW LIKELY ARE YOU TO NOD OFF OR FALL ASLEEP WHILE SITTING AND TALKING TO SOMEONE: WOULD NEVER DOZE
HOW LIKELY ARE YOU TO NOD OFF OR FALL ASLEEP WHILE LYING DOWN TO REST IN THE AFTERNOON WHEN CIRCUMSTANCES PERMIT: HIGH CHANCE OF DOZING
HOW LIKELY ARE YOU TO NOD OFF OR FALL ASLEEP WHILE SITTING QUIETLY AFTER LUNCH WITHOUT ALCOHOL: HIGH CHANCE OF DOZING
HOW LIKELY ARE YOU TO NOD OFF OR FALL ASLEEP WHILE WATCHING TV: HIGH CHANCE OF DOZING
HOW LIKELY ARE YOU TO NOD OFF OR FALL ASLEEP WHILE SITTING INACTIVE IN A PUBLIC PLACE: HIGH CHANCE OF DOZING
HOW LIKELY ARE YOU TO NOD OFF OR FALL ASLEEP IN A CAR, WHILE STOPPED FOR A FEW MINUTES IN TRAFFIC: WOULD NEVER DOZE
ESS TOTAL SCORE: 18

## 2024-08-08 NOTE — TELEPHONE ENCOUNTER
Patient was ref by Dr Lara to discuss the [place] Valve procedure. Please sign pending HRCT if appropriate. Thank you

## 2024-08-08 NOTE — PROGRESS NOTES
2.00  Years: 35.00  Pack years: 70  Types: Cigarettes  Quit date: 3/1/2020  Years since quitting: 3.1  Smokeless tobacco: Never  Vaping Use  Vaping Use: Never used  Alcohol use: No  Drug use: Not Currently  Types: Marijuana (Weed), Cocaine  Comment: hx of drug use 30 yrs ago     Pack years: 70  Last CT lung screen: 4/11/2023  Reason for exam:->see dx  Reason for Exam: SOB, COPD, EMPHYSEMA, OPEN HEART SURGERY, PACEMAKER, FORMER  SMOKER     FINDINGS:  Mediastinum: Status post median sternotomy, CABG and dual lead pacemaker.  There are no enlarged thoracic lymph nodes.     Lungs/pleura: The tracheobronchial tree is patent.  There is no pneumothorax  or pleural effusion.  Mild emphysema involves the bilateral lungs with  bibasilar scarring.     The previously noted left lower lobe bronchiolitis has completely resolved.  No change in the 2-3 mm solid bilateral pulmonary nodules.  No new pulmonary  nodules have developed.     Upper Abdomen: Status post cholecystectomy.     Soft Tissues/Bones: Degenerative changes involve the thoracic spine and  bilateral glenohumeral joints.  The bones are demineralized.  There is a  subacute healing left 10th rib fracture.     IMPRESSION:  1. Complete resolution of the previously noted left lower lobe bronchiolitis.  2. Unchanged solid subcentimeter bilateral pulmonary nodules.    Patient's PFT done showed patient to have very severe obstructive airway disease with FEV1 of 30% which was 57% in 2021 with no exertional hypoxemia which will warrant patient to have supplemental oxygen in the daytime with exercise    Patient's sleep study in 2021 shows patient to have severe OANH with AHI of 50.9/h along with that patient had nocturnal hypoxemia with saturation dropping to as low as 76%    Echocardiogram in 2020-   Summary   LV systolic function is normal with EF estimated at 55-60%. Endocardium not   entirely well visualized but no obvious segmental wall motion abnormalities.   There is

## 2024-08-09 NOTE — TELEPHONE ENCOUNTER
I spoke to Anirudh and explained that his PFTs will not qualify him for the [place] Valves. He was disappointed.

## 2024-08-11 DIAGNOSIS — I50.42 CHRONIC COMBINED SYSTOLIC AND DIASTOLIC CONGESTIVE HEART FAILURE (HCC): ICD-10-CM

## 2024-08-11 DIAGNOSIS — R60.9 EDEMA, UNSPECIFIED TYPE: ICD-10-CM

## 2024-08-12 RX ORDER — METOLAZONE 5 MG/1
5 TABLET ORAL WEEKLY
Qty: 10 TABLET | Refills: 0 | OUTPATIENT
Start: 2024-08-12

## 2024-08-12 NOTE — TELEPHONE ENCOUNTER
Last ov: 1/5/23 SRJ  Upcoming ov: no upcoming         Attempted to reach pt, left vm to call office back. Looks like pt sees [facility] cardiology, please confirm with pt if he follows there, if so defer refill to them  If not, pt needs appt for refills

## 2024-08-12 NOTE — TELEPHONE ENCOUNTER
Pt will call his pharmacy and have them correct Dr name for refills. He is seeing [facility]

## 2024-08-19 RX ORDER — METFORMIN HYDROCHLORIDE 500 MG/1
TABLET, EXTENDED RELEASE ORAL
Qty: 180 TABLET | Refills: 0 | Status: SHIPPED | OUTPATIENT
Start: 2024-08-19

## 2024-08-19 NOTE — TELEPHONE ENCOUNTER
Refill Request     CONFIRM preferrred pharmacy with the patient.    If Mail Order Rx - Pend for 90 day refill.      Last Seen: Last Seen Department: 6/12/2024  Last Seen by PCP: 6/12/2024    Last Written: 5/20/2024    If no future appointment scheduled, route STAFF MESSAGE with patient name to the  Pool for scheduling.      Next Appointment:   Future Appointments   Date Time Provider Department Center   9/12/2024  1:20 PM Florecita Chavez APRN - CNP [facility] DEP   9/23/2024 10:00 AM Florecita Chavez APRN - CNP [facility] DEP   10/22/2024  9:00 AM MMO CT RM 1 [facility] [place] [facility]   11/27/2024  1:00 PM Sebastian Lara MD AND PULM [facility]       Message sent to  to schedule appt with patient?  NO      Requested Prescriptions     Pending Prescriptions Disp Refills    metFORMIN (GLUCOPHAGE-XR) 500 MG extended release tablet [Pharmacy Med Name: METFORMIN HCL  MG TABLET] 180 tablet 0     Sig: TAKE 2 TABLETS BY MOUTH DAILY WITH BREAKFAST

## 2024-08-31 NOTE — TELEPHONE ENCOUNTER
Submitted PA for Desvenlafaxine Succinate ER 50MG er tablets  Via [facility] Key: [id number] STATUS: PENDING. Follow up done daily; if no response in three days we will refax for status check. If another three days goes by with no response we will call the insurance for status. 05-Sep-2024 14:45

## 2024-09-11 ENCOUNTER — TELEPHONE (OUTPATIENT)
Dept: FAMILY MEDICINE CLINIC | Age: 64
End: 2024-09-11

## 2024-09-17 ENCOUNTER — TELEPHONE (OUTPATIENT)
Dept: FAMILY MEDICINE CLINIC | Age: 64
End: 2024-09-17

## 2024-09-17 DIAGNOSIS — J44.9 COPD, VERY SEVERE (HCC): ICD-10-CM

## 2024-09-17 DIAGNOSIS — G20.A1 PARKINSON'S DISEASE, UNSPECIFIED WHETHER DYSKINESIA PRESENT, UNSPECIFIED WHETHER MANIFESTATIONS FLUCTUATE (HCC): ICD-10-CM

## 2024-09-17 DIAGNOSIS — I50.9 ACUTE ON CHRONIC CONGESTIVE HEART FAILURE, UNSPECIFIED HEART FAILURE TYPE (HCC): Primary | ICD-10-CM

## 2024-09-17 DIAGNOSIS — R06.03 ACUTE RESPIRATORY DISTRESS: ICD-10-CM

## 2024-09-17 DIAGNOSIS — M54.16 LUMBAR RADICULOPATHY: ICD-10-CM

## 2024-09-23 ENCOUNTER — OFFICE VISIT (OUTPATIENT)
Dept: FAMILY MEDICINE CLINIC | Age: 64
End: 2024-09-23

## 2024-09-23 VITALS
HEIGHT: 64 IN | DIASTOLIC BLOOD PRESSURE: 74 MMHG | WEIGHT: 281 LBS | BODY MASS INDEX: 47.97 KG/M2 | HEART RATE: 84 BPM | OXYGEN SATURATION: 95 % | SYSTOLIC BLOOD PRESSURE: 136 MMHG

## 2024-09-23 DIAGNOSIS — G25.81 RLS (RESTLESS LEGS SYNDROME): ICD-10-CM

## 2024-09-23 DIAGNOSIS — E55.9 VITAMIN D DEFICIENCY: ICD-10-CM

## 2024-09-23 DIAGNOSIS — I25.118 ATHEROSCLEROTIC HEART DISEASE OF NATIVE CORONARY ARTERY WITH OTHER FORMS OF ANGINA PECTORIS (HCC): ICD-10-CM

## 2024-09-23 DIAGNOSIS — I49.5 SSS (SICK SINUS SYNDROME) (HCC): Primary | ICD-10-CM

## 2024-09-23 DIAGNOSIS — F32.9 REACTIVE DEPRESSION: ICD-10-CM

## 2024-09-23 DIAGNOSIS — E11.649 UNCONTROLLED TYPE 2 DIABETES MELLITUS WITH HYPOGLYCEMIA WITHOUT COMA (HCC): ICD-10-CM

## 2024-09-23 DIAGNOSIS — Z12.5 SCREENING FOR PROSTATE CANCER: ICD-10-CM

## 2024-09-23 DIAGNOSIS — E66.01 MORBID OBESITY WITH BMI OF 45.0-49.9, ADULT: ICD-10-CM

## 2024-09-23 DIAGNOSIS — E78.2 MIXED HYPERLIPIDEMIA: ICD-10-CM

## 2024-09-23 DIAGNOSIS — G60.9 IDIOPATHIC PERIPHERAL NEUROPATHY: ICD-10-CM

## 2024-09-23 DIAGNOSIS — R31.9 HEMATURIA, UNSPECIFIED TYPE: ICD-10-CM

## 2024-09-23 DIAGNOSIS — Z23 FLU VACCINE NEED: ICD-10-CM

## 2024-09-23 PROBLEM — I25.110 CORONARY ARTERY DISEASE INVOLVING NATIVE CORONARY ARTERY OF NATIVE HEART WITH UNSTABLE ANGINA PECTORIS (HCC): Status: ACTIVE | Noted: 2020-09-22

## 2024-09-23 PROBLEM — J96.00 ACUTE RESPIRATORY FAILURE: Status: RESOLVED | Noted: 2020-08-19 | Resolved: 2024-09-23

## 2024-09-23 PROBLEM — R51.9 ACUTE NONINTRACTABLE HEADACHE: Status: RESOLVED | Noted: 2023-07-18 | Resolved: 2024-09-23

## 2024-09-23 PROBLEM — L03.313 CELLULITIS OF CHEST WALL: Status: RESOLVED | Noted: 2021-04-21 | Resolved: 2024-09-23

## 2024-09-23 PROBLEM — J44.1 COPD EXACERBATION (HCC): Status: RESOLVED | Noted: 2024-06-03 | Resolved: 2024-09-23

## 2024-09-23 PROBLEM — L03.90 CELLULITIS: Status: RESOLVED | Noted: 2021-04-21 | Resolved: 2024-09-23

## 2024-09-23 PROBLEM — I95.9 HYPOTENSION: Status: RESOLVED | Noted: 2023-03-14 | Resolved: 2024-09-23

## 2024-09-23 PROBLEM — M25.511 ACUTE PAIN OF RIGHT SHOULDER: Status: RESOLVED | Noted: 2023-03-13 | Resolved: 2024-09-23

## 2024-09-23 PROBLEM — R73.9 HYPERGLYCEMIA: Status: RESOLVED | Noted: 2020-01-06 | Resolved: 2024-09-23

## 2024-09-23 PROBLEM — I50.43 ACUTE ON CHRONIC COMBINED SYSTOLIC AND DIASTOLIC CHF (CONGESTIVE HEART FAILURE) (HCC): Status: RESOLVED | Noted: 2023-03-13 | Resolved: 2024-09-23

## 2024-09-23 PROBLEM — N17.9 ACUTE KIDNEY INJURY (HCC): Status: RESOLVED | Noted: 2021-04-08 | Resolved: 2024-09-23

## 2024-09-23 PROBLEM — J43.2 CENTRILOBULAR EMPHYSEMA (HCC): Status: RESOLVED | Noted: 2024-04-18 | Resolved: 2024-09-23

## 2024-09-23 PROBLEM — E44.0 MODERATE PROTEIN-CALORIE MALNUTRITION (HCC): Status: RESOLVED | Noted: 2020-03-17 | Resolved: 2024-09-23

## 2024-09-23 PROBLEM — I25.10 ASHD (ARTERIOSCLEROTIC HEART DISEASE): Status: RESOLVED | Noted: 2021-12-22 | Resolved: 2024-09-23

## 2024-09-23 PROBLEM — R09.02 HYPOXIA: Status: RESOLVED | Noted: 2023-07-18 | Resolved: 2024-09-23

## 2024-09-23 PROBLEM — I50.9 ACUTE ON CHRONIC CONGESTIVE HEART FAILURE (HCC): Status: RESOLVED | Noted: 2023-03-14 | Resolved: 2024-09-23

## 2024-09-23 LAB
BILIRUBIN, POC: NORMAL
BLOOD URINE, POC: NORMAL
CLARITY, POC: CLEAR
COLOR, POC: NORMAL
GLUCOSE URINE, POC: NORMAL MG/DL
KETONES, POC: NORMAL MG/DL
LEUKOCYTE EST, POC: NEGATIVE
NITRITE, POC: NEGATIVE
PH, POC: 6
PROTEIN, POC: NORMAL MG/DL
SPECIFIC GRAVITY, POC: 1.03
UROBILINOGEN, POC: NORMAL MG/DL

## 2024-09-23 RX ORDER — ATORVASTATIN CALCIUM 20 MG/1
20 TABLET, FILM COATED ORAL DAILY
COMMUNITY
End: 2024-09-23

## 2024-09-23 RX ORDER — ATORVASTATIN CALCIUM 80 MG/1
80 TABLET, FILM COATED ORAL DAILY
COMMUNITY

## 2024-09-23 RX ORDER — PRAMIPEXOLE DIHYDROCHLORIDE 1 MG/1
1 TABLET ORAL NIGHTLY
Qty: 90 TABLET | Refills: 3 | Status: SHIPPED | OUTPATIENT
Start: 2024-09-23

## 2024-09-23 ASSESSMENT — ANXIETY QUESTIONNAIRES
6. BECOMING EASILY ANNOYED OR IRRITABLE: NEARLY EVERY DAY
3. WORRYING TOO MUCH ABOUT DIFFERENT THINGS: NEARLY EVERY DAY
1. FEELING NERVOUS, ANXIOUS, OR ON EDGE: NEARLY EVERY DAY
2. NOT BEING ABLE TO STOP OR CONTROL WORRYING: NEARLY EVERY DAY
4. TROUBLE RELAXING: NEARLY EVERY DAY
7. FEELING AFRAID AS IF SOMETHING AWFUL MIGHT HAPPEN: NEARLY EVERY DAY
GAD7 TOTAL SCORE: 20
5. BEING SO RESTLESS THAT IT IS HARD TO SIT STILL: MORE THAN HALF THE DAYS

## 2024-09-23 ASSESSMENT — PATIENT HEALTH QUESTIONNAIRE - PHQ9
SUM OF ALL RESPONSES TO PHQ QUESTIONS 1-9: 9
8. MOVING OR SPEAKING SO SLOWLY THAT OTHER PEOPLE COULD HAVE NOTICED. OR THE OPPOSITE, BEING SO FIGETY OR RESTLESS THAT YOU HAVE BEEN MOVING AROUND A LOT MORE THAN USUAL: NOT AT ALL
5. POOR APPETITE OR OVEREATING: SEVERAL DAYS
SUM OF ALL RESPONSES TO PHQ QUESTIONS 1-9: 9
SUM OF ALL RESPONSES TO PHQ QUESTIONS 1-9: 9
SUM OF ALL RESPONSES TO PHQ9 QUESTIONS 1 & 2: 4
7. TROUBLE CONCENTRATING ON THINGS, SUCH AS READING THE NEWSPAPER OR WATCHING TELEVISION: SEVERAL DAYS
2. FEELING DOWN, DEPRESSED OR HOPELESS: MORE THAN HALF THE DAYS
4. FEELING TIRED OR HAVING LITTLE ENERGY: MORE THAN HALF THE DAYS
1. LITTLE INTEREST OR PLEASURE IN DOING THINGS: MORE THAN HALF THE DAYS
10. IF YOU CHECKED OFF ANY PROBLEMS, HOW DIFFICULT HAVE THESE PROBLEMS MADE IT FOR YOU TO DO YOUR WORK, TAKE CARE OF THINGS AT HOME, OR GET ALONG WITH OTHER PEOPLE: EXTREMELY DIFFICULT
9. THOUGHTS THAT YOU WOULD BE BETTER OFF DEAD, OR OF HURTING YOURSELF: NOT AT ALL
3. TROUBLE FALLING OR STAYING ASLEEP: SEVERAL DAYS
SUM OF ALL RESPONSES TO PHQ QUESTIONS 1-9: 9
6. FEELING BAD ABOUT YOURSELF - OR THAT YOU ARE A FAILURE OR HAVE LET YOURSELF OR YOUR FAMILY DOWN: NOT AT ALL

## 2024-09-24 LAB
25(OH)D3 SERPL-MCNC: 21.4 NG/ML
ALBUMIN SERPL-MCNC: 4 G/DL (ref 3.4–5)
ALBUMIN/GLOB SERPL: 1.6 {RATIO} (ref 1.1–2.2)
ALP SERPL-CCNC: 107 U/L (ref 40–129)
ALT SERPL-CCNC: 28 U/L (ref 10–40)
ANION GAP SERPL CALCULATED.3IONS-SCNC: 11 MMOL/L (ref 3–16)
AST SERPL-CCNC: 21 U/L (ref 15–37)
BASOPHILS # BLD: 0 K/UL (ref 0–0.2)
BASOPHILS NFR BLD: 0.4 %
BILIRUB SERPL-MCNC: 0.9 MG/DL (ref 0–1)
BUN SERPL-MCNC: 13 MG/DL (ref 7–20)
CALCIUM SERPL-MCNC: 9.7 MG/DL (ref 8.3–10.6)
CHLORIDE SERPL-SCNC: 96 MMOL/L (ref 99–110)
CHOLEST SERPL-MCNC: 128 MG/DL (ref 0–199)
CO2 SERPL-SCNC: 30 MMOL/L (ref 21–32)
CREAT SERPL-MCNC: 0.9 MG/DL (ref 0.8–1.3)
CREAT UR-MCNC: 767 MG/DL (ref 39–259)
DEPRECATED RDW RBC AUTO: 17.2 % (ref 12.4–15.4)
EOSINOPHIL # BLD: 0.1 K/UL (ref 0–0.6)
EOSINOPHIL NFR BLD: 1.1 %
EST. AVERAGE GLUCOSE BLD GHB EST-MCNC: 162.8 MG/DL
GFR SERPLBLD CREATININE-BSD FMLA CKD-EPI: >90 ML/MIN/{1.73_M2}
GLUCOSE SERPL-MCNC: 134 MG/DL (ref 70–99)
HBA1C MFR BLD: 7.3 %
HCT VFR BLD AUTO: 53.5 % (ref 40.5–52.5)
HDLC SERPL-MCNC: 38 MG/DL (ref 40–60)
HGB BLD-MCNC: 17.2 G/DL (ref 13.5–17.5)
LDLC SERPL CALC-MCNC: 51 MG/DL
LYMPHOCYTES # BLD: 1.7 K/UL (ref 1–5.1)
LYMPHOCYTES NFR BLD: 15.5 %
MCH RBC QN AUTO: 29.7 PG (ref 26–34)
MCHC RBC AUTO-ENTMCNC: 32.2 G/DL (ref 31–36)
MCV RBC AUTO: 92.3 FL (ref 80–100)
MICROALBUMIN UR DL<=1MG/L-MCNC: 17.7 MG/DL
MICROALBUMIN/CREAT UR: 23.1 MG/G (ref 0–30)
MONOCYTES # BLD: 0.5 K/UL (ref 0–1.3)
MONOCYTES NFR BLD: 4.7 %
NEUTROPHILS # BLD: 8.7 K/UL (ref 1.7–7.7)
NEUTROPHILS NFR BLD: 78.3 %
PLATELET # BLD AUTO: 212 K/UL (ref 135–450)
PMV BLD AUTO: 8.9 FL (ref 5–10.5)
POTASSIUM SERPL-SCNC: 4.5 MMOL/L (ref 3.5–5.1)
PROT SERPL-MCNC: 6.5 G/DL (ref 6.4–8.2)
PSA SERPL DL<=0.01 NG/ML-MCNC: 1.07 NG/ML (ref 0–4)
RBC # BLD AUTO: 5.8 M/UL (ref 4.2–5.9)
SODIUM SERPL-SCNC: 137 MMOL/L (ref 136–145)
TRIGL SERPL-MCNC: 194 MG/DL (ref 0–150)
TSH SERPL DL<=0.005 MIU/L-ACNC: 0.99 UIU/ML (ref 0.27–4.2)
VLDLC SERPL CALC-MCNC: 39 MG/DL
WBC # BLD AUTO: 11.1 K/UL (ref 4–11)

## 2024-09-24 ASSESSMENT — ENCOUNTER SYMPTOMS
BACK PAIN: 1
ALLERGIC/IMMUNOLOGIC NEGATIVE: 1
COUGH: 1
ABDOMINAL PAIN: 0
BLOOD IN STOOL: 0
GASTROINTESTINAL NEGATIVE: 1
ANAL BLEEDING: 0
EYES NEGATIVE: 1
NAUSEA: 0
SHORTNESS OF BREATH: 1
COLOR CHANGE: 1

## 2024-09-25 DIAGNOSIS — G25.81 RLS (RESTLESS LEGS SYNDROME): ICD-10-CM

## 2024-09-25 LAB — BACTERIA UR CULT: NORMAL

## 2024-09-25 RX ORDER — PRAMIPEXOLE DIHYDROCHLORIDE 0.5 MG/1
0.5 TABLET ORAL NIGHTLY
Qty: 30 TABLET | Refills: 2 | OUTPATIENT
Start: 2024-09-25

## 2024-09-25 NOTE — TELEPHONE ENCOUNTER
Refill Request     CONFIRM preferrred pharmacy with the patient.    If Mail Order Rx - Pend for 90 day refill.      Last Seen: Last Seen Department: 9/23/2024  Last Seen by PCP: 9/23/2024    Last Written: 6/24/24    If no future appointment scheduled, route STAFF MESSAGE with patient name to the  Pool for scheduling.      Next Appointment:   Future Appointments   Date Time Provider Department Center   10/11/2024  2:00 PM Florecita Chavez APRN - CNP [facility] DEP   10/18/2024 10:00 AM Florecita Chavez APRN - CNP [facility] ECC DEP   10/22/2024  9:00 AM MMO CT RM 1 [facility]. [place] [facility]   11/27/2024  1:00 PM Sebastian Lara MD AND PULM MMA   3/24/2025  8:40 AM Florecita Chavez APRN - CNP [facility] DEP       Message sent to  to schedule appt with patient?  N/A      Requested Prescriptions     Pending Prescriptions Disp Refills    pramipexole (MIRAPEX) 0.5 MG tablet [Pharmacy Med Name: PRAMIPEXOLE 0.5 MG TABLET] 30 tablet 2     Sig: TAKE ONE TABLET BY MOUTH ONCE NIGHTLY

## 2024-09-30 DIAGNOSIS — F33.1 MODERATE EPISODE OF RECURRENT MAJOR DEPRESSIVE DISORDER (HCC): Primary | ICD-10-CM

## 2024-09-30 DIAGNOSIS — R80.9 MICROALBUMINURIA: Primary | ICD-10-CM

## 2024-09-30 RX ORDER — QUETIAPINE FUMARATE 50 MG/1
50 TABLET, EXTENDED RELEASE ORAL NIGHTLY
Qty: 30 TABLET | Refills: 0 | Status: SHIPPED | OUTPATIENT
Start: 2024-09-30 | End: 2024-10-02 | Stop reason: CLARIF

## 2024-10-01 ENCOUNTER — TELEPHONE (OUTPATIENT)
Dept: ADMINISTRATIVE | Age: 64
End: 2024-10-01

## 2024-10-01 NOTE — TELEPHONE ENCOUNTER
Submitted PA for QUEtiapine Fumarate ER 50MG er tablets   Via [facility] Key: [id number] STATUS: PENDING.    Follow up done daily; if no decision with in three days we will refax.  If another three days goes by with no decision will call the insurance for status.'

## 2024-10-02 RX ORDER — QUETIAPINE FUMARATE 50 MG/1
50 TABLET, FILM COATED ORAL NIGHTLY
Qty: 30 TABLET | Refills: 1 | Status: SHIPPED | OUTPATIENT
Start: 2024-10-02

## 2024-10-02 NOTE — TELEPHONE ENCOUNTER
Seroquel XR was denied however it looks like both olanzapine and Seroquel immediate release are covered  I did go ahead and send a prescription for Seroquel immediate release 50 mg nightly to his pharmacy

## 2024-10-02 NOTE — TELEPHONE ENCOUNTER
The medication was DENIED; DENIAL letter is uploaded to MEDIA.    General Denial Reasoning:    ___  Patient must try  medication before the insurance will cover this drug.  Unless there is a contraindication that you can provide.    ___  Drug is not covered for off label usage.  This drug is FDA approved for .    ___  Drug is not covered by the plan ( Plan Exclusion)    _X__  Other; please see Denial Letter.    DIABETIC MEDICATION DENIALS:    ___ Must have a A1C greater the 7.0 for new starts.    ___ Hypoglycemic episodes or 3 or more injections of insulin daily for Continuous Monitors.    ___   Drug is not covered by the plan ( Plan Exclusion)    ___  Other; please see Denial Letter.    WEIGHT LOSS MEDICATIONS DENIALS:    ___  Must have Tried and Failed Diet and Exercise.    ___  Insurance requesting Weight Loss Diary.    ___   Drug is not covered by the plan ( Plan Exclusion)    ___  Other; please see Denial Letter.    Note :  Denial letter in media       If you want an APPEAL; please note in this encounter what new information you would like to APPEAL with.  Once complete route back to PA POOL.    If this requires a response please respond to the pool ( P MHCX [facility] MEDICATION PRE-AUTH).      Thank you please advise patient.

## 2024-10-07 ENCOUNTER — HOSPITAL ENCOUNTER (OUTPATIENT)
Dept: CT IMAGING | Age: 64
Discharge: HOME | End: 2024-10-07
Attending: INTERNAL MEDICINE
Payer: MEDICARE

## 2024-10-07 DIAGNOSIS — Z87.891 PERSONAL HISTORY OF TOBACCO USE: ICD-10-CM

## 2024-10-07 PROCEDURE — 71271 CT THORAX LUNG CANCER SCR C-: CPT

## 2024-10-08 SDOH — HEALTH STABILITY: PHYSICAL HEALTH: ON AVERAGE, HOW MANY DAYS PER WEEK DO YOU ENGAGE IN MODERATE TO STRENUOUS EXERCISE (LIKE A BRISK WALK)?: 0 DAYS

## 2024-10-08 ASSESSMENT — PATIENT HEALTH QUESTIONNAIRE - PHQ9
7. TROUBLE CONCENTRATING ON THINGS, SUCH AS READING THE NEWSPAPER OR WATCHING TELEVISION: NEARLY EVERY DAY
SUM OF ALL RESPONSES TO PHQ QUESTIONS 1-9: 23
2. FEELING DOWN, DEPRESSED OR HOPELESS: MORE THAN HALF THE DAYS
4. FEELING TIRED OR HAVING LITTLE ENERGY: NEARLY EVERY DAY
1. LITTLE INTEREST OR PLEASURE IN DOING THINGS: NEARLY EVERY DAY
6. FEELING BAD ABOUT YOURSELF - OR THAT YOU ARE A FAILURE OR HAVE LET YOURSELF OR YOUR FAMILY DOWN: MORE THAN HALF THE DAYS
10. IF YOU CHECKED OFF ANY PROBLEMS, HOW DIFFICULT HAVE THESE PROBLEMS MADE IT FOR YOU TO DO YOUR WORK, TAKE CARE OF THINGS AT HOME, OR GET ALONG WITH OTHER PEOPLE: EXTREMELY DIFFICULT
SUM OF ALL RESPONSES TO PHQ QUESTIONS 1-9: 23
8. MOVING OR SPEAKING SO SLOWLY THAT OTHER PEOPLE COULD HAVE NOTICED. OR THE OPPOSITE, BEING SO FIGETY OR RESTLESS THAT YOU HAVE BEEN MOVING AROUND A LOT MORE THAN USUAL: MORE THAN HALF THE DAYS
5. POOR APPETITE OR OVEREATING: NEARLY EVERY DAY
SUM OF ALL RESPONSES TO PHQ QUESTIONS 1-9: 23
SUM OF ALL RESPONSES TO PHQ QUESTIONS 1-9: 21
9. THOUGHTS THAT YOU WOULD BE BETTER OFF DEAD, OR OF HURTING YOURSELF: MORE THAN HALF THE DAYS
SUM OF ALL RESPONSES TO PHQ9 QUESTIONS 1 & 2: 5

## 2024-10-08 ASSESSMENT — LIFESTYLE VARIABLES
HOW OFTEN DO YOU HAVE SIX OR MORE DRINKS ON ONE OCCASION: 1
HOW OFTEN DO YOU HAVE A DRINK CONTAINING ALCOHOL: NEVER
HOW MANY STANDARD DRINKS CONTAINING ALCOHOL DO YOU HAVE ON A TYPICAL DAY: 0
HOW OFTEN DO YOU HAVE A DRINK CONTAINING ALCOHOL: 1
HOW MANY STANDARD DRINKS CONTAINING ALCOHOL DO YOU HAVE ON A TYPICAL DAY: PATIENT DOES NOT DRINK

## 2024-10-11 ENCOUNTER — TELEMEDICINE (OUTPATIENT)
Dept: FAMILY MEDICINE CLINIC | Age: 64
End: 2024-10-11

## 2024-10-11 DIAGNOSIS — F51.01 PRIMARY INSOMNIA: ICD-10-CM

## 2024-10-11 DIAGNOSIS — F41.9 ANXIETY: ICD-10-CM

## 2024-10-11 DIAGNOSIS — G25.81 RLS (RESTLESS LEGS SYNDROME): Primary | ICD-10-CM

## 2024-10-11 DIAGNOSIS — G60.9 IDIOPATHIC PERIPHERAL NEUROPATHY: ICD-10-CM

## 2024-10-11 DIAGNOSIS — F32.9 REACTIVE DEPRESSION: ICD-10-CM

## 2024-10-11 DIAGNOSIS — Z13.31 POSITIVE DEPRESSION SCREENING: ICD-10-CM

## 2024-10-11 RX ORDER — GABAPENTIN 300 MG/1
300 CAPSULE ORAL 2 TIMES DAILY
Qty: 60 CAPSULE | Refills: 0 | Status: SHIPPED | OUTPATIENT
Start: 2024-10-11 | End: 2024-11-10

## 2024-10-11 RX ORDER — QUETIAPINE FUMARATE 50 MG/1
TABLET, FILM COATED ORAL
Qty: 30 TABLET | Refills: 0 | Status: SHIPPED | OUTPATIENT
Start: 2024-10-11

## 2024-10-11 ASSESSMENT — ENCOUNTER SYMPTOMS
COUGH: 1
GASTROINTESTINAL NEGATIVE: 1
BACK PAIN: 1
BLOOD IN STOOL: 0
NAUSEA: 0
ALLERGIC/IMMUNOLOGIC NEGATIVE: 1
COLOR CHANGE: 1
ABDOMINAL PAIN: 0
ANAL BLEEDING: 0
SHORTNESS OF BREATH: 1
EYES NEGATIVE: 1

## 2024-10-11 NOTE — PROGRESS NOTES
family down 2   Trouble concentrating on things, such as reading the newspaper or watching television 3   Moving or speaking so slowly that other people could have noticed. Or the opposite - being so fidgety or restless that you have been moving around a lot more than usual 2   Thoughts that you would be better off dead, or of hurting yourself in some way 2   PHQ-2 Score 5   PHQ-9 Total Score 23   If you checked off any problems, how difficult have these problems made it for you to do your work, take care of things at home, or get along with other people? 3         9/23/2024    10:02 AM 3/18/2024    10:20 AM 8/23/2023     4:13 PM 7/12/2023    10:05 AM 11/23/2022     1:13 PM   MAYRA 7 SCORE   MAYRA-7 Total Score 20 14 15 20 19     Interpretation of MAYRA-7 score: 5-9 = mild anxiety, 10-14 = moderate anxiety, 15+ = severe anxiety. Recommend referral to behavioral health for scores 10 or greater.    Past Medical History:   Diagnosis Date    Acute diastolic congestive heart failure (HCC)     Acute diverticulitis 04/10/2021    Acute kidney injury (HCC) 04/08/2021    Acute nonintractable headache 07/18/2023    Acute on chronic congestive heart failure (HCC) 03/14/2023    Acute on chronic respiratory failure with hypoxemia     Acute on chronic respiratory failure with hypoxemia     Acute pain of right shoulder 03/13/2023    Acute respiratory distress     Acute respiratory failure (HCC) 08/19/2020    Acute respiratory failure with hypoxia     Anxiety 01/06/2020    Aortic valve calcification 09/2020    seen on lung CT    CAD in native artery 04/14/2021    Cellulitis 04/21/2021    Cellulitis of chest wall 04/21/2021    Centrilobular emphysema (HCC) 04/18/2024    Chronic obstructive pulmonary disease ([facility]) 09/03/2019    uses O2 2L prn during the day & 4L at night    Chronic systolic congestive heart failure (HCC) 04/10/2021    HfrEF    Class 3 severe obesity with body mass index (BMI) of 45.0 to 49.9 in adult ([facility])     COPD

## 2024-10-18 ENCOUNTER — SCHEDULED TELEPHONE ENCOUNTER (OUTPATIENT)
Dept: FAMILY MEDICINE CLINIC | Age: 64
End: 2024-10-18

## 2024-10-18 DIAGNOSIS — J44.1 COPD EXACERBATION (HCC): ICD-10-CM

## 2024-10-18 DIAGNOSIS — F51.01 PRIMARY INSOMNIA: Primary | ICD-10-CM

## 2024-10-18 DIAGNOSIS — G60.9 IDIOPATHIC PERIPHERAL NEUROPATHY: ICD-10-CM

## 2024-10-18 DIAGNOSIS — G25.81 RLS (RESTLESS LEGS SYNDROME): ICD-10-CM

## 2024-10-18 DIAGNOSIS — F32.9 REACTIVE DEPRESSION: ICD-10-CM

## 2024-10-18 DIAGNOSIS — F41.9 ANXIETY: ICD-10-CM

## 2024-10-18 DIAGNOSIS — R05.3 CHRONIC COUGH: ICD-10-CM

## 2024-10-18 RX ORDER — PRAMIPEXOLE DIHYDROCHLORIDE 1 MG/1
1 TABLET ORAL DAILY
COMMUNITY
End: 2024-10-18

## 2024-10-18 RX ORDER — BENZONATATE 200 MG/1
200 CAPSULE ORAL 3 TIMES DAILY PRN
Qty: 90 CAPSULE | Refills: 2 | Status: SHIPPED | OUTPATIENT
Start: 2024-10-18

## 2024-10-18 RX ORDER — GABAPENTIN 300 MG/1
300 CAPSULE ORAL 3 TIMES DAILY
Qty: 90 CAPSULE | Refills: 0 | Status: SHIPPED | OUTPATIENT
Start: 2024-10-18 | End: 2024-11-17

## 2024-10-18 RX ORDER — ZOLPIDEM TARTRATE 10 MG/1
10 TABLET ORAL NIGHTLY PRN
Qty: 14 TABLET | Refills: 0 | Status: SHIPPED | OUTPATIENT
Start: 2024-10-18 | End: 2024-11-01

## 2024-10-18 NOTE — PROGRESS NOTES
appointment for the relevant concern      Florecita Chavez, APRN - CNP   
Universal Safety Interventions

## 2024-10-19 DIAGNOSIS — J44.9 MODERATE COPD (CHRONIC OBSTRUCTIVE PULMONARY DISEASE) (HCC): ICD-10-CM

## 2024-10-21 RX ORDER — FLUTICASONE FUROATE, UMECLIDINIUM BROMIDE AND VILANTEROL TRIFENATATE 100; 62.5; 25 UG/1; UG/1; UG/1
POWDER RESPIRATORY (INHALATION)
Qty: 1 EACH | Refills: 2 | Status: SHIPPED | OUTPATIENT
Start: 2024-10-21

## 2024-10-31 ENCOUNTER — TELEPHONE (OUTPATIENT)
Dept: FAMILY MEDICINE CLINIC | Age: 64
End: 2024-10-31

## 2024-10-31 NOTE — TELEPHONE ENCOUNTER
Spoke with Gifty.  Advised her that patient is willing to come to his appt next week for his face to face wheelchair evaluation.  Florecita is not signing form until patient is seen.  Unable to make any addendums to his visit in Sept

## 2024-10-31 NOTE — TELEPHONE ENCOUNTER
Gifty from [facility] medical called stating there is no need for another face to face for gregs wheel chair. States she already has all of that from your initial office visit when you referred him for the evaluation. All they are needing is the forms signed so he can get the wheel chair. I informed her of your previous message and she said there is no medicare fraud since you have already done a face to face with him prior to his evaluation. She said you are more than welcome to call her to discuss this and with any questions or concerns at [phone] she is just trying to get the patient his wheelchair as he has done everything needed on his end and really needs this

## 2024-10-31 NOTE — TELEPHONE ENCOUNTER
I have explained to the patient multiple times that this is a Medicare Rule and  that I must see him in the office for a face-to-face evaluation and to go over the paperwork.    It is illegal and considered Medicare fraud if I signed the papers without a face-to-face evaluation with him to determine the appropriateness of the orders

## 2024-10-31 NOTE — TELEPHONE ENCOUNTER
I am sorry but Gifty at [facility] medical can be called and informed that I have not done a face-to-face visit for his wheelchair.  The patient is scheduled next week to have this done and then the paperwork will be completed if appropriate

## 2024-11-01 RX ORDER — RANOLAZINE 500 MG/1
TABLET, EXTENDED RELEASE ORAL
COMMUNITY
Start: 2024-10-18

## 2024-11-01 RX ORDER — POTASSIUM CHLORIDE 1500 MG/1
TABLET, EXTENDED RELEASE ORAL
COMMUNITY
Start: 2024-09-14

## 2024-11-01 RX ORDER — ISOSORBIDE MONONITRATE 60 MG/1
60 TABLET, EXTENDED RELEASE ORAL DAILY
COMMUNITY
Start: 2024-07-19

## 2024-11-01 RX ORDER — EMPAGLIFLOZIN 25 MG/1
TABLET, FILM COATED ORAL
COMMUNITY
Start: 2024-10-15

## 2024-11-04 ENCOUNTER — OFFICE VISIT (OUTPATIENT)
Dept: PULMONOLOGY | Age: 64
End: 2024-11-04
Payer: MEDICARE

## 2024-11-04 VITALS
DIASTOLIC BLOOD PRESSURE: 68 MMHG | WEIGHT: 273 LBS | HEART RATE: 100 BPM | RESPIRATION RATE: 16 BRPM | TEMPERATURE: 97.8 F | BODY MASS INDEX: 46.61 KG/M2 | OXYGEN SATURATION: 94 % | HEIGHT: 64 IN | SYSTOLIC BLOOD PRESSURE: 115 MMHG

## 2024-11-04 DIAGNOSIS — Z87.891 FORMER SMOKER: ICD-10-CM

## 2024-11-04 DIAGNOSIS — J44.9 COPD, VERY SEVERE (HCC): Primary | ICD-10-CM

## 2024-11-04 DIAGNOSIS — R91.8 LUNG NODULES: ICD-10-CM

## 2024-11-04 DIAGNOSIS — E66.813 CLASS 3 SEVERE OBESITY DUE TO EXCESS CALORIES WITH SERIOUS COMORBIDITY AND BODY MASS INDEX (BMI) OF 45.0 TO 49.9 IN ADULT: ICD-10-CM

## 2024-11-04 DIAGNOSIS — E66.01 CLASS 3 SEVERE OBESITY DUE TO EXCESS CALORIES WITH SERIOUS COMORBIDITY AND BODY MASS INDEX (BMI) OF 45.0 TO 49.9 IN ADULT: ICD-10-CM

## 2024-11-04 PROCEDURE — 99214 OFFICE O/P EST MOD 30 MIN: CPT | Performed by: NURSE PRACTITIONER

## 2024-11-04 RX ORDER — BUDESONIDE, GLYCOPYRROLATE, AND FORMOTEROL FUMARATE 160; 9; 4.8 UG/1; UG/1; UG/1
2 AEROSOL, METERED RESPIRATORY (INHALATION) 2 TIMES DAILY
Qty: 2 EACH | Refills: 0 | Status: SHIPPED | COMMUNITY
Start: 2024-11-04

## 2024-11-04 ASSESSMENT — ENCOUNTER SYMPTOMS
RHINORRHEA: 0
WHEEZING: 0
SORE THROAT: 0
ABDOMINAL PAIN: 0
SHORTNESS OF BREATH: 1
COUGH: 1
EYE PAIN: 0
CHEST TIGHTNESS: 0
BACK PAIN: 1

## 2024-11-04 NOTE — PROGRESS NOTES
MA Communication:  The following orders are received by verbal communication from Kerry Baig CNP    Orders include:  2 Breztri smaples, 3 mon fu  
    1. COPD, very severe (HCC)  2. Lung nodules  3. Former smoker  4. Class 3 severe obesity due to excess calories with serious comorbidity and body mass index (BMI) of 45.0 to 49.9 in adult       Prior pulmonary OV note, PFT report and recent chest imaging report reviewed. Due for repeat LDCT in October 2025. COPD stable today however worsening am symptoms.   I have personally reviewed and summarized the old records and/or obtained further history from someone other than the patient.    Reviewed present meds and side effects. Reviewed proper inhaler usage.  Trial Breztri.  Can use albuterol as needed for increased SOB, wheezing. Discussed holding Trelegy while on Breztri.  He is to also use mucinex to help break up thick mucus.      Discussed when to call with worsening symptoms such as increased shortness of breath, productive cough, wheezing or symptoms not responding to treatment plan.      Smoking cessation counseling provided. Individualized cessation plan includes continued smoking cessation.       Declines further sleep apnea testing at this time.  Continue oxygen therapy, benefiting from use.   Advised to avoid driving when too sleepy to function safely. Discussed the risks of untreated apnea such as accidents, cognitive impairment, mood impairment, high blood pressure, various cardiac diseases and stroke.     Regarding weight, recommend trying formal program and increase physical activity by adding a 30 minute walk to  daily routine.Weight loss was encouraged as a long term approach to treatment of OANH. Explained the correlation between obesity and apnea and the causative role it can play.      RIK DRISCOLL, APRN - CNP

## 2024-11-04 NOTE — PATIENT INSTRUCTIONS
to complete and return your survey.  We hope you will choose us in the future for your healthcare needs.     Pt instructed of all future appointment dates & times, including radiology, labs, procedures & referrals. If procedures were scheduled preparation instructions provided. Instructions on future appointments with [facility] Pulmonary were given.     In the next few weeks, you will be receiving a survey from [employer] regarding your visit today.  We would greatly appreciate it if you would take just a few minutes to fill that out.  It is very important to us that our patients receive top notch care and our surveys help keep us accountable. However, if your experience was not a good one, we want to hear about that as well. This is a key way we can keep track of problems and strive to correct any for future visits.    Again, we appreciate your time and thank you for choosing [facility]!    Romeo, CMA

## 2024-11-06 ENCOUNTER — OFFICE VISIT (OUTPATIENT)
Dept: FAMILY MEDICINE CLINIC | Age: 64
End: 2024-11-06

## 2024-11-06 VITALS
HEIGHT: 64 IN | BODY MASS INDEX: 46.78 KG/M2 | DIASTOLIC BLOOD PRESSURE: 70 MMHG | WEIGHT: 274 LBS | SYSTOLIC BLOOD PRESSURE: 114 MMHG | HEART RATE: 84 BPM | OXYGEN SATURATION: 96 %

## 2024-11-06 DIAGNOSIS — M47.816 SPONDYLOSIS WITHOUT MYELOPATHY OR RADICULOPATHY, LUMBAR REGION: ICD-10-CM

## 2024-11-06 DIAGNOSIS — E11.649 UNCONTROLLED TYPE 2 DIABETES MELLITUS WITH HYPOGLYCEMIA WITHOUT COMA (HCC): ICD-10-CM

## 2024-11-06 DIAGNOSIS — I25.110 CORONARY ARTERY DISEASE INVOLVING NATIVE CORONARY ARTERY OF NATIVE HEART WITH UNSTABLE ANGINA PECTORIS (HCC): ICD-10-CM

## 2024-11-06 DIAGNOSIS — I49.5 SSS (SICK SINUS SYNDROME) (HCC): Primary | ICD-10-CM

## 2024-11-06 DIAGNOSIS — I50.42 CHRONIC COMBINED SYSTOLIC AND DIASTOLIC CONGESTIVE HEART FAILURE (HCC): ICD-10-CM

## 2024-11-06 DIAGNOSIS — J44.9 COPD, VERY SEVERE (HCC): ICD-10-CM

## 2024-11-06 DIAGNOSIS — J43.2 CENTRILOBULAR EMPHYSEMA (HCC): ICD-10-CM

## 2024-11-06 DIAGNOSIS — G60.9 IDIOPATHIC PERIPHERAL NEUROPATHY: ICD-10-CM

## 2024-11-06 DIAGNOSIS — R53.1 GENERALIZED WEAKNESS: ICD-10-CM

## 2024-11-06 ASSESSMENT — ENCOUNTER SYMPTOMS
COLOR CHANGE: 1
BLOOD IN STOOL: 0
GASTROINTESTINAL NEGATIVE: 1
SHORTNESS OF BREATH: 1
EYES NEGATIVE: 1
ALLERGIC/IMMUNOLOGIC NEGATIVE: 1
BACK PAIN: 1
NAUSEA: 0
COUGH: 1
ANAL BLEEDING: 0
ABDOMINAL PAIN: 0
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
## 2024-11-08 DIAGNOSIS — F51.01 PRIMARY INSOMNIA: ICD-10-CM

## 2024-11-08 NOTE — TELEPHONE ENCOUNTER
Spoke with patient.  Ambien did help a lot with his sleep.  The only problem that he had it that in the morning the Ambien made him have a hang over feeling.  After a few hours he was okay.  Will discuss at his visit on 11/14/2024-ZAY   Detail Level: Generalized Detail Level: Zone

## 2024-11-08 NOTE — TELEPHONE ENCOUNTER
Refill Request     CONFIRM preferrred pharmacy with the patient.    If Mail Order Rx - Pend for 90 day refill.      Last Seen: Last Seen Department: 11/6/2024  Last Seen by PCP: 11/6/2024    Last Written: 10/18/2024    If no future appointment scheduled, route STAFF MESSAGE with patient name to the  Pool for scheduling.      Next Appointment:   Future Appointments   Date Time Provider Department Center   11/14/2024  8:20 AM Florecita Chavez APRN - CNP [facility] [place] [place]   1/2/2025  9:00 AM SCHEDULE, MHCX [facility] DEP   2/3/2025 10:30 AM Kerry Baig APRN - CNP AND PULM [facility]   3/24/2025  8:40 AM Florecita Chavez APRN - CNP [facility] [place]       Message sent to  to schedule appt with patient?  NO      Requested Prescriptions     Pending Prescriptions Disp Refills    zolpidem (AMBIEN) 10 MG tablet [Pharmacy Med Name: ZOLPIDEM TARTRATE 10 MG TABLET] 14 tablet      Sig: Take 1 tablet by mouth nightly as needed for Sleep.

## 2024-11-08 NOTE — TELEPHONE ENCOUNTER
On 1018/24 patient was given a 14 day trial of ambien.  He was to let me know how he is doing on this or if he had any side effects.        Also   If patient would like to continue with ambien he will need to sign a contract as discussed when he comes in to see me on 11/14/24

## 2024-11-10 DIAGNOSIS — G25.81 RLS (RESTLESS LEGS SYNDROME): ICD-10-CM

## 2024-11-10 DIAGNOSIS — F41.9 ANXIETY: ICD-10-CM

## 2024-11-10 DIAGNOSIS — G60.9 IDIOPATHIC PERIPHERAL NEUROPATHY: ICD-10-CM

## 2024-11-11 RX ORDER — GABAPENTIN 300 MG/1
300 CAPSULE ORAL 2 TIMES DAILY
Qty: 60 CAPSULE | Refills: 0 | Status: SHIPPED | OUTPATIENT
Start: 2024-11-11 | End: 2024-12-11

## 2024-11-11 RX ORDER — ZOLPIDEM TARTRATE 10 MG/1
10 TABLET ORAL NIGHTLY PRN
Qty: 14 TABLET | OUTPATIENT
Start: 2024-11-11

## 2024-11-11 NOTE — TELEPHONE ENCOUNTER
Refill Request     CONFIRM preferrred pharmacy with the patient.    If Mail Order Rx - Pend for 90 day refill.      Last Seen: Last Seen Department: 11/6/2024  Last Seen by PCP: 11/6/2024    Last Written: 10/18/24    If no future appointment scheduled, route STAFF MESSAGE with patient name to the  Pool for scheduling.      Next Appointment:   Future Appointments   Date Time Provider Department Center   11/14/2024  8:20 AM Florecita Chavez APRN - CNP [facility] DEP   1/2/2025  9:00 AM SCHEDULE, MHCX [facility] DEP   2/3/2025 10:30 AM Kerry Baig APRN - CNP AND PULM [facility]   3/24/2025  8:40 AM Florecita Chavez APRN - CNP [facility] DEP       Message sent to  to schedule appt with patient?  N/A      Requested Prescriptions     Pending Prescriptions Disp Refills    gabapentin (NEURONTIN) 300 MG capsule [Pharmacy Med Name: GABAPENTIN 300 MG CAPSULE] 60 capsule      Sig: TAKE 1 CAPSULE BY MOUTH 2 TIMES A DAY

## 2024-11-13 SDOH — HEALTH STABILITY: PHYSICAL HEALTH: ON AVERAGE, HOW MANY DAYS PER WEEK DO YOU ENGAGE IN MODERATE TO STRENUOUS EXERCISE (LIKE A BRISK WALK)?: 0 DAYS

## 2024-11-13 ASSESSMENT — PATIENT HEALTH QUESTIONNAIRE - PHQ9
4. FEELING TIRED OR HAVING LITTLE ENERGY: NEARLY EVERY DAY
SUM OF ALL RESPONSES TO PHQ QUESTIONS 1-9: 22
1. LITTLE INTEREST OR PLEASURE IN DOING THINGS: NEARLY EVERY DAY
SUM OF ALL RESPONSES TO PHQ9 QUESTIONS 1 & 2: 5
5. POOR APPETITE OR OVEREATING: NEARLY EVERY DAY
10. IF YOU CHECKED OFF ANY PROBLEMS, HOW DIFFICULT HAVE THESE PROBLEMS MADE IT FOR YOU TO DO YOUR WORK, TAKE CARE OF THINGS AT HOME, OR GET ALONG WITH OTHER PEOPLE: VERY DIFFICULT
6. FEELING BAD ABOUT YOURSELF - OR THAT YOU ARE A FAILURE OR HAVE LET YOURSELF OR YOUR FAMILY DOWN: MORE THAN HALF THE DAYS
SUM OF ALL RESPONSES TO PHQ QUESTIONS 1-9: 22
7. TROUBLE CONCENTRATING ON THINGS, SUCH AS READING THE NEWSPAPER OR WATCHING TELEVISION: NEARLY EVERY DAY
SUM OF ALL RESPONSES TO PHQ QUESTIONS 1-9: 22
3. TROUBLE FALLING OR STAYING ASLEEP: NEARLY EVERY DAY
SUM OF ALL RESPONSES TO PHQ QUESTIONS 1-9: 22
2. FEELING DOWN, DEPRESSED OR HOPELESS: MORE THAN HALF THE DAYS
8. MOVING OR SPEAKING SO SLOWLY THAT OTHER PEOPLE COULD HAVE NOTICED. OR THE OPPOSITE, BEING SO FIGETY OR RESTLESS THAT YOU HAVE BEEN MOVING AROUND A LOT MORE THAN USUAL: NEARLY EVERY DAY
9. THOUGHTS THAT YOU WOULD BE BETTER OFF DEAD, OR OF HURTING YOURSELF: NOT AT ALL

## 2024-11-13 ASSESSMENT — LIFESTYLE VARIABLES
HOW OFTEN DO YOU HAVE A DRINK CONTAINING ALCOHOL: 1
HOW MANY STANDARD DRINKS CONTAINING ALCOHOL DO YOU HAVE ON A TYPICAL DAY: 0
HOW MANY STANDARD DRINKS CONTAINING ALCOHOL DO YOU HAVE ON A TYPICAL DAY: PATIENT DOES NOT DRINK
HOW OFTEN DO YOU HAVE A DRINK CONTAINING ALCOHOL: NEVER
HOW OFTEN DO YOU HAVE SIX OR MORE DRINKS ON ONE OCCASION: 1

## 2024-11-14 ENCOUNTER — OFFICE VISIT (OUTPATIENT)
Dept: FAMILY MEDICINE CLINIC | Age: 64
End: 2024-11-14

## 2024-11-14 DIAGNOSIS — Z13.31 POSITIVE DEPRESSION SCREENING: ICD-10-CM

## 2024-11-14 DIAGNOSIS — F51.01 PRIMARY INSOMNIA: ICD-10-CM

## 2024-11-14 DIAGNOSIS — Z00.00 WELCOME TO MEDICARE PREVENTIVE VISIT: Primary | ICD-10-CM

## 2024-11-14 DIAGNOSIS — R41.3 MEMORY CHANGES: ICD-10-CM

## 2024-11-14 DIAGNOSIS — H91.93 BILATERAL CHANGE IN HEARING: ICD-10-CM

## 2024-11-14 DIAGNOSIS — Z91.81 AT HIGH RISK FOR FALLS: ICD-10-CM

## 2024-11-14 DIAGNOSIS — R45.4 FEELING ANGRY: ICD-10-CM

## 2024-11-14 DIAGNOSIS — F43.9 STRESS: ICD-10-CM

## 2024-11-14 DIAGNOSIS — Z65.8 AT HIGH RISK FOR LONELINESS: ICD-10-CM

## 2024-11-14 DIAGNOSIS — R53.83 OTHER FATIGUE: ICD-10-CM

## 2024-11-14 DIAGNOSIS — G44.89 OTHER HEADACHE SYNDROME: ICD-10-CM

## 2024-11-14 RX ORDER — QUETIAPINE 150 MG/1
150 TABLET, FILM COATED, EXTENDED RELEASE ORAL DAILY
Qty: 30 TABLET | Refills: 1 | Status: SHIPPED | OUTPATIENT
Start: 2024-11-14

## 2024-11-14 ASSESSMENT — PATIENT HEALTH QUESTIONNAIRE - PHQ9
8. MOVING OR SPEAKING SO SLOWLY THAT OTHER PEOPLE COULD HAVE NOTICED. OR THE OPPOSITE, BEING SO FIGETY OR RESTLESS THAT YOU HAVE BEEN MOVING AROUND A LOT MORE THAN USUAL: SEVERAL DAYS
SUM OF ALL RESPONSES TO PHQ QUESTIONS 1-9: 18
4. FEELING TIRED OR HAVING LITTLE ENERGY: NEARLY EVERY DAY
10. IF YOU CHECKED OFF ANY PROBLEMS, HOW DIFFICULT HAVE THESE PROBLEMS MADE IT FOR YOU TO DO YOUR WORK, TAKE CARE OF THINGS AT HOME, OR GET ALONG WITH OTHER PEOPLE: VERY DIFFICULT
SUM OF ALL RESPONSES TO PHQ QUESTIONS 1-9: 18
SUM OF ALL RESPONSES TO PHQ QUESTIONS 1-9: 18
7. TROUBLE CONCENTRATING ON THINGS, SUCH AS READING THE NEWSPAPER OR WATCHING TELEVISION: MORE THAN HALF THE DAYS
SUM OF ALL RESPONSES TO PHQ9 QUESTIONS 1 & 2: 6
6. FEELING BAD ABOUT YOURSELF - OR THAT YOU ARE A FAILURE OR HAVE LET YOURSELF OR YOUR FAMILY DOWN: NOT AT ALL
5. POOR APPETITE OR OVEREATING: NEARLY EVERY DAY
1. LITTLE INTEREST OR PLEASURE IN DOING THINGS: NEARLY EVERY DAY
9. THOUGHTS THAT YOU WOULD BE BETTER OFF DEAD, OR OF HURTING YOURSELF: NOT AT ALL
2. FEELING DOWN, DEPRESSED OR HOPELESS: NEARLY EVERY DAY
3. TROUBLE FALLING OR STAYING ASLEEP: NEARLY EVERY DAY
SUM OF ALL RESPONSES TO PHQ QUESTIONS 1-9: 18

## 2024-11-14 NOTE — PROGRESS NOTES
Medicare Annual Wellness Visit    Isaac Delgado is here for Other (New to medicare )    Assessment & Plan   Welcome to Medicare preventive visit  Other headache syndrome  -     MRI BRAIN WO CONTRAST; Future  Memory changes  -     MRI BRAIN WO CONTRAST; Future  At high risk for falls  Interventions:    Reviewed medications, home hazards, visual acuity, and co-morbidities that can increase risk for falls  Patient had evaluation for power mobility chair and is in process of obtaining it through insurance.   See AVS for additional education material    Positive depression screening  At high risk for loneliness  Stress  Increase Seroquel to the following  -     QUEtiapine (SEROQUEL XR) 150 MG TB24 extended release tablet; Take 1 tablet by mouth daily, Disp-30 tablet, R-1Normal  Feeling angry   PHQ-9 score today: (PHQ-9 Total Score: 18), additional evaluation and assessment performed, follow-up plan includes but not limited to: Medication management and Referral to /Specialist  for evaluation and management.   Patient has seen psychiatry in the past and quit going.  Recommend referral back to psychiatry however patient refused  Patient is using Seroquel for both his mood as well as sleep.  I will go ahead and increase his Seroquel 50 mg 2 tablets for total of 100 mg to Seroquel  mg nightly  Patient did try Ambien to help with his sleep however he had \"hangover effect\" the next morning and does not really want to continue with any Ambien    Other fatigue  Bilateral change in hearing  -     Logan Vilchis MD, Otolaryngology, [facility]  Body mass index (BMI) of 45.0-49.9 in adult  Primary insomnia  -     QUEtiapine (SEROQUEL XR) 150 MG TB24 extended release tablet; Take 1 tablet by mouth daily, Disp-30 tablet, R-1Normal    Augmentin was prescribed for the tender cystlike area on the left side of his neck.  If there is no improvement the patient will let me know and we will consider ultrasound for

## 2024-11-22 ENCOUNTER — TELEPHONE (OUTPATIENT)
Dept: FAMILY MEDICINE CLINIC | Age: 64
End: 2024-11-22

## 2024-11-22 DIAGNOSIS — I25.110 CORONARY ARTERY DISEASE INVOLVING NATIVE CORONARY ARTERY OF NATIVE HEART WITH UNSTABLE ANGINA PECTORIS (HCC): ICD-10-CM

## 2024-11-22 DIAGNOSIS — R53.1 GENERALIZED WEAKNESS: ICD-10-CM

## 2024-11-22 DIAGNOSIS — I50.42 CHRONIC COMBINED SYSTOLIC AND DIASTOLIC CONGESTIVE HEART FAILURE (HCC): ICD-10-CM

## 2024-11-22 DIAGNOSIS — I49.5 SSS (SICK SINUS SYNDROME) (HCC): ICD-10-CM

## 2024-11-22 DIAGNOSIS — J43.2 CENTRILOBULAR EMPHYSEMA (HCC): ICD-10-CM

## 2024-11-22 DIAGNOSIS — M47.816 SPONDYLOSIS WITHOUT MYELOPATHY OR RADICULOPATHY, LUMBAR REGION: ICD-10-CM

## 2024-11-22 DIAGNOSIS — G60.9 IDIOPATHIC PERIPHERAL NEUROPATHY: ICD-10-CM

## 2024-11-22 DIAGNOSIS — E11.649 UNCONTROLLED TYPE 2 DIABETES MELLITUS WITH HYPOGLYCEMIA WITHOUT COMA (HCC): ICD-10-CM

## 2024-11-22 DIAGNOSIS — J44.9 COPD, VERY SEVERE (HCC): ICD-10-CM

## 2024-11-22 NOTE — TELEPHONE ENCOUNTER
[facility] called and was needing something to help support the diagnosis of Parkinson. They ask for a letter to them that he has the Parkinson and can fax it to [phone].

## 2024-11-25 RX ORDER — METFORMIN HYDROCHLORIDE 500 MG/1
TABLET, EXTENDED RELEASE ORAL
Qty: 180 TABLET | Refills: 0 | Status: SHIPPED | OUTPATIENT
Start: 2024-11-25

## 2024-11-25 NOTE — TELEPHONE ENCOUNTER
Parkinson's was removed from his diagnosis list.    Patient has never been diagnosed with Parkinson's and I am not sure where this diagnosis is coming from

## 2024-11-25 NOTE — TELEPHONE ENCOUNTER
Refill Request     CONFIRM preferrred pharmacy with the patient.    If Mail Order Rx - Pend for 90 day refill.      Last Seen: Last Seen Department: 11/14/2024  Last Seen by PCP: 11/14/2024    Last Written: 8.19.24    If no future appointment scheduled, route STAFF MESSAGE with patient name to the  Pool for scheduling.      Next Appointment:   Future Appointments   Date Time Provider Department Center   12/3/2024  3:30 PM [facility] MRI  1 [place] MRI [facility]   12/11/2024  9:00 AM Yoana Guevara AuD [facility] AUDIO [facility]   12/11/2024  9:30 AM Katerina Benoit DO [place] [facility]   1/2/2025  9:00 AM SCHEDULE, MHCX [place] [facility]AB [facility]CC DEP   2/3/2025 10:30 AM Kerry Baig APRN - CNP AND PULM [facility]   3/24/2025  8:40 AM Florecita Chavez APRN - CNP [facility] DEP       Message sent to  to schedule appt with patient?  N/A      Requested Prescriptions     Pending Prescriptions Disp Refills    metFORMIN (GLUCOPHAGE-XR) 500 MG extended release tablet [Pharmacy Med Name: METFORMIN HCL  MG TABLET] 180 tablet 0     Sig: TAKE 2 TABLETS BY MOUTH DAILY WITH BREAKFAST

## 2024-12-03 ENCOUNTER — HOSPITAL ENCOUNTER (OUTPATIENT)
Dept: MRI IMAGING | Age: 64
Discharge: HOME | End: 2024-12-03
Payer: MEDICARE

## 2024-12-03 VITALS — SYSTOLIC BLOOD PRESSURE: 121 MMHG | DIASTOLIC BLOOD PRESSURE: 57 MMHG

## 2024-12-03 DIAGNOSIS — R41.3 MEMORY CHANGES: ICD-10-CM

## 2024-12-03 DIAGNOSIS — G44.89 OTHER HEADACHE SYNDROME: ICD-10-CM

## 2024-12-03 PROCEDURE — 93286 PERI-PX EVAL PM/LDLS PM IP: CPT

## 2024-12-06 ENCOUNTER — TELEPHONE (OUTPATIENT)
Dept: FAMILY MEDICINE CLINIC | Age: 64
End: 2024-12-06

## 2024-12-06 DIAGNOSIS — R53.1 GENERALIZED WEAKNESS: ICD-10-CM

## 2024-12-06 DIAGNOSIS — I25.110 CORONARY ARTERY DISEASE INVOLVING NATIVE CORONARY ARTERY OF NATIVE HEART WITH UNSTABLE ANGINA PECTORIS (HCC): ICD-10-CM

## 2024-12-06 DIAGNOSIS — E11.649 UNCONTROLLED TYPE 2 DIABETES MELLITUS WITH HYPOGLYCEMIA WITHOUT COMA (HCC): ICD-10-CM

## 2024-12-06 DIAGNOSIS — M47.816 SPONDYLOSIS WITHOUT MYELOPATHY OR RADICULOPATHY, LUMBAR REGION: ICD-10-CM

## 2024-12-06 DIAGNOSIS — J43.2 CENTRILOBULAR EMPHYSEMA (HCC): ICD-10-CM

## 2024-12-06 DIAGNOSIS — G44.029 CHRONIC CLUSTER HEADACHE, NOT INTRACTABLE: Primary | ICD-10-CM

## 2024-12-06 DIAGNOSIS — J44.9 COPD, VERY SEVERE (HCC): ICD-10-CM

## 2024-12-06 DIAGNOSIS — I50.42 CHRONIC COMBINED SYSTOLIC AND DIASTOLIC CONGESTIVE HEART FAILURE (HCC): ICD-10-CM

## 2024-12-06 DIAGNOSIS — G60.9 IDIOPATHIC PERIPHERAL NEUROPATHY: ICD-10-CM

## 2024-12-06 DIAGNOSIS — R41.3 MEMORY CHANGES: ICD-10-CM

## 2024-12-06 DIAGNOSIS — I49.5 SSS (SICK SINUS SYNDROME) (HCC): ICD-10-CM

## 2024-12-06 NOTE — TELEPHONE ENCOUNTER
[employer] called and stated that they have spoken to [facility]hab [facility] about the electric wheel chair for the pt. the only thing that [facility] needs is a updated order and an updated diagnose that includes Florecita Chavez's signature.       [facility] # [phone]

## 2024-12-06 NOTE — TELEPHONE ENCOUNTER
Prescription has been printed and signed.  All diagnosis codes that are applicable are on the prescription

## 2024-12-06 NOTE — TELEPHONE ENCOUNTER
----- Message from ANICETO Cochran CNP sent at 12/6/2024  3:57 PM EST -----  MRI is essentially normal showing no acute intracranial abnormality  There is some mild chronic vascular changes that could be associated with age.  Recommend patient see neurologist for further evaluation of his headaches and memory changes

## 2024-12-08 DIAGNOSIS — G25.81 RLS (RESTLESS LEGS SYNDROME): ICD-10-CM

## 2024-12-08 DIAGNOSIS — G60.9 IDIOPATHIC PERIPHERAL NEUROPATHY: ICD-10-CM

## 2024-12-08 DIAGNOSIS — F41.9 ANXIETY: ICD-10-CM

## 2024-12-09 RX ORDER — GABAPENTIN 300 MG/1
300 CAPSULE ORAL 2 TIMES DAILY
Qty: 60 CAPSULE | Refills: 0 | Status: SHIPPED | OUTPATIENT
Start: 2024-12-09 | End: 2025-01-08

## 2024-12-09 RX ORDER — QUETIAPINE FUMARATE 50 MG/1
50 TABLET, FILM COATED ORAL NIGHTLY
Qty: 30 TABLET | Refills: 0 | Status: SHIPPED | OUTPATIENT
Start: 2024-12-09

## 2024-12-09 NOTE — TELEPHONE ENCOUNTER
Refill Request     CONFIRM preferrred pharmacy with the patient.    If Mail Order Rx - Pend for 90 day refill.      Last Seen: Last Seen Department: 11/14/2024  Last Seen by PCP: 11/14/2024    Last Written: 11/11/24    If no future appointment scheduled, route STAFF MESSAGE with patient name to the  Pool for scheduling.      Next Appointment:   Future Appointments   Date Time Provider Department Center   12/11/2024  9:00 AM Yoana Guevara AuD CLER AUDIO [facility]   12/11/2024  9:30 AM Katerina Benoit DO [facility]   1/2/2025  9:00 AM SCHEDULE, MHCX MT ORAB [facility] ECC DEP   2/3/2025 10:30 AM Kerry Baig APRN - CNP AND PULM [facility]   3/24/2025  8:40 AM Florecita Chavez APRN - CNP [facility] DEP       Message sent to  to schedule appt with patient?  N/A      Requested Prescriptions     Pending Prescriptions Disp Refills    gabapentin (NEURONTIN) 300 MG capsule [Pharmacy Med Name: GABAPENTIN 300 MG CAPSULE] 60 capsule 0     Sig: TAKE 1 CAPSULE BY MOUTH 2 TIMES A DAY    QUEtiapine (SEROQUEL) 50 MG tablet [Pharmacy Med Name: QUEtiapine FUMARATE 50 MG TAB] 30 tablet      Sig: TAKE 1 TABLET BY MOUTH EVERY NIGHT AT BEDTIME

## 2024-12-11 ENCOUNTER — OFFICE VISIT (OUTPATIENT)
Dept: ENT CLINIC | Age: 64
End: 2024-12-11
Payer: MEDICARE

## 2024-12-11 ENCOUNTER — TELEPHONE (OUTPATIENT)
Dept: PULMONOLOGY | Age: 64
End: 2024-12-11

## 2024-12-11 ENCOUNTER — PROCEDURE VISIT (OUTPATIENT)
Dept: AUDIOLOGY | Age: 64
End: 2024-12-11
Payer: MEDICARE

## 2024-12-11 VITALS
HEIGHT: 64 IN | SYSTOLIC BLOOD PRESSURE: 149 MMHG | BODY MASS INDEX: 46.78 KG/M2 | WEIGHT: 274 LBS | DIASTOLIC BLOOD PRESSURE: 79 MMHG | HEART RATE: 91 BPM

## 2024-12-11 DIAGNOSIS — H93.13 TINNITUS OF BOTH EARS: ICD-10-CM

## 2024-12-11 DIAGNOSIS — H90.3 SENSORINEURAL HEARING LOSS (SNHL) OF BOTH EARS: Primary | ICD-10-CM

## 2024-12-11 DIAGNOSIS — H90.3 SENSORINEURAL HEARING LOSS, BILATERAL: Primary | ICD-10-CM

## 2024-12-11 PROCEDURE — 1036F TOBACCO NON-USER: CPT | Performed by: OTOLARYNGOLOGY

## 2024-12-11 PROCEDURE — G8417 CALC BMI ABV UP PARAM F/U: HCPCS | Performed by: OTOLARYNGOLOGY

## 2024-12-11 PROCEDURE — G8484 FLU IMMUNIZE NO ADMIN: HCPCS | Performed by: OTOLARYNGOLOGY

## 2024-12-11 PROCEDURE — 92567 TYMPANOMETRY: CPT | Performed by: AUDIOLOGIST

## 2024-12-11 PROCEDURE — 92557 COMPREHENSIVE HEARING TEST: CPT | Performed by: AUDIOLOGIST

## 2024-12-11 PROCEDURE — G8427 DOCREV CUR MEDS BY ELIG CLIN: HCPCS | Performed by: OTOLARYNGOLOGY

## 2024-12-11 PROCEDURE — 99213 OFFICE O/P EST LOW 20 MIN: CPT | Performed by: OTOLARYNGOLOGY

## 2024-12-11 PROCEDURE — 3017F COLORECTAL CA SCREEN DOC REV: CPT | Performed by: OTOLARYNGOLOGY

## 2024-12-11 ASSESSMENT — ENCOUNTER SYMPTOMS
SORE THROAT: 0
COUGH: 0
SINUS PRESSURE: 0
APNEA: 0
TROUBLE SWALLOWING: 0
VOICE CHANGE: 0
EYE ITCHING: 0
SHORTNESS OF BREATH: 0
FACIAL SWELLING: 0

## 2024-12-11 NOTE — PROGRESS NOTES
[facility]  [address], [address]  [place] [address]  P: [phone]       Patient     Isaac Delgado  1960    ChiefComplaint     Chief Complaint   Patient presents with    Hearing Problem     X yrs, seems to be getting worse.         History of Present Illness     Isaac is a 64-year-old male here today for evaluation of bilateral hearing loss.  Present for years but has been gradually declining.  Denies otalgia, otorrhea, vertigo.  Admits tinnitus bilaterally.    Past Medical History     Past Medical History:   Diagnosis Date    Acute diastolic congestive heart failure (HCC)     Acute diverticulitis 04/10/2021    Acute kidney injury (HCC) 04/08/2021    Acute nonintractable headache 07/18/2023    Acute on chronic congestive heart failure (HCC) 03/14/2023    Acute on chronic respiratory failure with hypoxemia     Acute on chronic respiratory failure with hypoxemia     Acute pain of right shoulder 03/13/2023    Acute respiratory distress     Acute respiratory failure 08/19/2020    Acute respiratory failure with hypoxia     Anxiety 01/06/2020    Aortic valve calcification 09/2020    seen on lung CT    CAD in native artery 04/14/2021    Cellulitis 04/21/2021    Cellulitis of chest wall 04/21/2021    Centrilobular emphysema (HCC) 04/18/2024    Chronic obstructive pulmonary disease (HCC) 09/03/2019    uses O2 2L prn during the day & 4L at night    Chronic systolic congestive heart failure (HCC) 04/10/2021    HfrEF    Class 3 severe obesity with body mass index (BMI) of 45.0 to 49.9 in adult     COPD exacerbation (HCC) 06/03/2024    Coronary artery calcification 09/2020    seen on lung ct    Coronary artery disease involving native heart with angina pectoris (HCC) 09/22/2020    Crush injury of right foot 07/23/2015    DDD (degenerative disc disease), lumbar 01/06/2020    Depression 01/06/2020    Diabetes mellitus ([facility])     Diverticulitis of colon 04/10/2021    Erectile dysfunction

## 2024-12-11 NOTE — TELEPHONE ENCOUNTER
Patient called stating he's out of Trelegy and Breztri his pharmacy stated they both have been removed from his list of medications, he stated that Trelegy works better and needs its as soon as possible. Please advise

## 2024-12-11 NOTE — TELEPHONE ENCOUNTER
Please sign Trelegy script if appropriate.    Last OV:  11/4/24  Next OV:  2/3/25  Last Refill:  Discontinued on 11/4 OV and told to try Breztri.  Pt prefers Trelegy
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
## 2024-12-19 ENCOUNTER — TELEPHONE (OUTPATIENT)
Dept: FAMILY MEDICINE CLINIC | Age: 64
End: 2024-12-19

## 2024-12-19 NOTE — TELEPHONE ENCOUNTER
Nany from [facility] medical calling stating that pt was approved for chair. He did have a $4,000 co-payment but it was able to be written off. INFO only.

## 2024-12-21 NOTE — CARE COORDINATION
DISCHARGE ORDER  Date/Time 3/17/2023 12:10 PM  Completed by: SHYANN Means  Case Management Department  Phone: [phone] Fax: [phone]  Case Management    Patient Name: Ilana Chance      : 1960  Admitting Diagnosis: Acute on chronic combined systolic and diastolic CHF (congestive heart failure) ([facility] [address] [I50.43]  Chest pain, unspecified type [R07.9]  Acute on chronic congestive heart failure, unspecified heart failure type ([address] [I50.9]      Admit order Date and Status:2023 Inpatient   (verify MD's last order for status of admission)      Noted discharge order. If applicable PT/OT recommendation at Discharge: n/a  DME recommendation by PT/OT:n/a    Confirmed discharge plan: Yes  with whom pt  If pt confirmed DC plan does family need to be contacted by CM No if yes who n/a    Discharge Plan:     Date of Last IMM Given: n/a    Reviewed chart. Role of discharge planner explained and patient verbalized understanding. Discharge order is noted. Spoke with RN who states she walked pt and he won't need cont o2 which she will place a note in [facility]. Met with pt at bedside. Pt confirmed he is returning home and his wife will be here shortly. He is aware he didn't need cont o2. He denied needing skilled home care for RN/PT/OT when discussing this. He denied all needs from CM    Has Home O2 in place on admit:  yes at night    Pt is being d/c'd to home today. Pt's O2 sats are 95% on 2 liters in epic; RN will out note in epic as he didn't need cont o2 when she walked him. Discharge timeout done with Juliocesar Tran RN. All discharge needs and concerns addressed. Faith tested positive for RSV infection we recommend supportive treatment such as humidifier in the room, managing fevers and pain with ibuprofen or Tylenol.  Increase oral fluid intake.  If symptoms are not improving or if they worsen despite recommended treatment plan advised returning for further evaluation.  Attempted 3 times to contact you with these results when they are available unable to leave a message as your your message box reported that it is full.

## 2024-12-24 NOTE — PLAN OF CARE
Problem: SAFETY  Goal: Free from accidental physical injury  9/21/2020 2231 by Pop Mitchell RN  Outcome: Not Met This Shift  9/21/2020 1905 by Bebe Ramirez RN  Outcome: Ongoing  Goal: Free from intentional harm  9/21/2020 2231 by Pop Mitchell RN  Outcome: Not Met This Shift  9/21/2020 1905 by Bebe Ramirez RN  Outcome: Ongoing     Problem: DAILY CARE  Goal: Daily care needs are met  9/21/2020 2231 by Pop Mitchell RN  Outcome: Not Met This Shift  9/21/2020 1905 by Bebe Ramirez RN  Outcome: Ongoing     Problem: PAIN  Goal: Patient's pain/discomfort is manageable  9/21/2020 2231 by Pop Mitchell RN  Outcome: Not Met This Shift  9/21/2020 1905 by Bebe Ramirez RN  Outcome: Ongoing     Problem: SKIN INTEGRITY  Goal: Skin integrity is maintained or improved  9/21/2020 2231 by Pop Mitchell RN  Outcome: Not Met This Shift  9/21/2020 1905 by Bebe Ramirez RN  Outcome: Ongoing     Problem: KNOWLEDGE DEFICIT  Goal: Patient/S.O. demonstrates understanding of disease process, treatment plan, medications, and discharge instructions.   9/21/2020 2231 by Pop Mitchell RN  Outcome: Not Met This Shift  9/21/2020 1905 by Bebe Ramirez RN  Outcome: Ongoing     Problem: DISCHARGE BARRIERS  Goal: Patient's continuum of care needs are met  9/21/2020 2231 by Pop Mitchell RN  Outcome: Not Met This Shift  9/21/2020 1905 by Bebe Ramirez RN  Outcome: Ongoing     Problem: Pain:  Goal: Pain level will decrease  Description: Pain level will decrease  9/21/2020 2231 by Pop Mitchell RN  Outcome: Not Met This Shift  9/21/2020 1905 by Bebe Ramirez RN  Outcome: Ongoing  Goal: Control of acute pain  Description: Control of acute pain  9/21/2020 2231 by Pop Mitchell RN  Outcome: Not Met This Shift  9/21/2020 1905 by Bebe Ramirez RN  Outcome: Ongoing  Goal: Control of chronic pain  Description: Control of chronic pain  9/21/2020 2231 by Pop Mitchell RN  Outcome: Not Met This Shift  9/21/2020 1905 by non clinical appointment info/Event Note Alma Jacob RN  Outcome: Ongoing [place]

## 2025-01-02 ENCOUNTER — NURSE ONLY (OUTPATIENT)
Dept: FAMILY MEDICINE CLINIC | Age: 65
End: 2025-01-02

## 2025-01-02 DIAGNOSIS — R80.9 MICROALBUMINURIA: ICD-10-CM

## 2025-01-03 LAB
CREAT UR-MCNC: 86.6 MG/DL (ref 39–259)
MICROALBUMIN UR DL<=1MG/L-MCNC: <1.2 MG/DL
MICROALBUMIN/CREAT UR: NORMAL MG/G (ref 0–30)

## 2025-01-05 DIAGNOSIS — G60.9 IDIOPATHIC PERIPHERAL NEUROPATHY: ICD-10-CM

## 2025-01-05 DIAGNOSIS — G25.81 RLS (RESTLESS LEGS SYNDROME): ICD-10-CM

## 2025-01-05 DIAGNOSIS — F41.9 ANXIETY: ICD-10-CM

## 2025-01-06 RX ORDER — QUETIAPINE FUMARATE 50 MG/1
50 TABLET, FILM COATED ORAL NIGHTLY
Qty: 30 TABLET | Refills: 0 | OUTPATIENT
Start: 2025-01-06

## 2025-01-06 RX ORDER — GABAPENTIN 300 MG/1
300 CAPSULE ORAL 2 TIMES DAILY
Qty: 60 CAPSULE | Refills: 2 | Status: SHIPPED | OUTPATIENT
Start: 2025-01-06 | End: 2025-04-06

## 2025-01-06 NOTE — TELEPHONE ENCOUNTER
Refill Request     CONFIRM preferrred pharmacy with the patient.    If Mail Order Rx - Pend for 90 day refill.      Last Seen: Last Seen Department: 11/14/2024  Last Seen by PCP: 11/14/2024    Last Written: 11-    If no future appointment scheduled, route STAFF MESSAGE with patient name to the  Pool for scheduling.      Next Appointment:   Future Appointments   Date Time Provider Department Center   1/10/2025  3:40 PM Florecita Chavez APRN - CNP [facility] ECC DEP   2/3/2025 10:30 [facility]   3/26/2025  2:00 PM Florecita Chavez APRN - CNP [facility] DEP       Message sent to  to schedule appt with patient?  N/A      Requested Prescriptions     Pending Prescriptions Disp Refills    QUEtiapine (SEROQUEL) 50 MG tablet [Pharmacy Med Name: QUEtiapine FUMARATE 50 MG TAB] 30 tablet 0     Sig: TAKE 1 TABLET BY MOUTH EVERY NIGHT AT BEDTIME    gabapentin (NEURONTIN) 300 MG capsule [Pharmacy Med Name: GABAPENTIN 300 MG CAPSULE] 60 capsule 0     Sig: Take 1 capsule by mouth 2 times daily.

## 2025-01-07 ENCOUNTER — TELEPHONE (OUTPATIENT)
Dept: FAMILY MEDICINE CLINIC | Age: 65
End: 2025-01-07

## 2025-01-13 RX ORDER — QUETIAPINE FUMARATE 50 MG/1
50 TABLET, FILM COATED ORAL NIGHTLY
Qty: 30 TABLET | Refills: 5 | Status: SHIPPED | OUTPATIENT
Start: 2025-01-13

## 2025-01-13 NOTE — TELEPHONE ENCOUNTER
Refill Request     CONFIRM preferrred pharmacy with the patient.    If Mail Order Rx - Pend for 90 day refill.      Last Seen: Last Seen Department: 11/14/2024  Last Seen by PCP: 11/14/2024    Last Written: 12-9-2024    If no future appointment scheduled, route STAFF MESSAGE with patient name to the  Pool for scheduling.      Next Appointment:   Future Appointments   Date Time Provider Department Center   1/17/2025  1:20 PM Florecita Chavez APRN - CNP [facility] ECC DEP   2/3/2025 10:30 AM Kerry Baig APRN - CNP AND PULM [facility]   3/26/2025  2:00 PM Florecita Chavez APRN - CNP [facility] ECC DEP       Message sent to  to schedule appt with patient?  N/A      Requested Prescriptions     Pending Prescriptions Disp Refills    QUEtiapine (SEROQUEL) 50 MG tablet [Pharmacy Med Name: QUEtiapine FUMARATE 50 MG TAB] 30 tablet 0     Sig: TAKE 1 TABLET BY MOUTH EVERY NIGHT AT BEDTIME

## 2025-01-14 ENCOUNTER — APPOINTMENT (OUTPATIENT)
Dept: CT IMAGING | Age: 65
End: 2025-01-14
Payer: MEDICARE

## 2025-01-14 ENCOUNTER — HOSPITAL ENCOUNTER (EMERGENCY)
Age: 65
Discharge: ANOTHER HOSPITAL | End: 2025-01-14
Attending: EMERGENCY MEDICINE
Payer: MEDICARE

## 2025-01-14 ENCOUNTER — APPOINTMENT (OUTPATIENT)
Dept: GENERAL RADIOLOGY | Age: 65
End: 2025-01-14
Payer: MEDICARE

## 2025-01-14 VITALS
DIASTOLIC BLOOD PRESSURE: 66 MMHG | HEIGHT: 64 IN | SYSTOLIC BLOOD PRESSURE: 109 MMHG | BODY MASS INDEX: 47.88 KG/M2 | HEART RATE: 85 BPM | WEIGHT: 280.43 LBS | TEMPERATURE: 98.3 F | OXYGEN SATURATION: 100 % | RESPIRATION RATE: 24 BRPM

## 2025-01-14 DIAGNOSIS — J96.02 ACUTE RESPIRATORY FAILURE WITH HYPERCAPNIA: Primary | ICD-10-CM

## 2025-01-14 LAB
ALBUMIN SERPL-MCNC: 4.1 G/DL (ref 3.4–5)
ALBUMIN/GLOB SERPL: 1.1 {RATIO} (ref 1.1–2.2)
ALP SERPL-CCNC: 99 U/L (ref 40–129)
ALT SERPL-CCNC: 15 U/L (ref 10–40)
ANION GAP SERPL CALCULATED.3IONS-SCNC: 6 MMOL/L (ref 3–16)
AST SERPL-CCNC: 13 U/L (ref 15–37)
BASE EXCESS BLDV CALC-SCNC: 13.3 MMOL/L (ref -3–3)
BASE EXCESS BLDV CALC-SCNC: 13.5 MMOL/L (ref -3–3)
BASOPHILS # BLD: 0 K/UL (ref 0–0.2)
BASOPHILS NFR BLD: 0.3 %
BILIRUB SERPL-MCNC: 0.8 MG/DL (ref 0–1)
BUN SERPL-MCNC: 22 MG/DL (ref 7–20)
CALCIUM SERPL-MCNC: 10.4 MG/DL (ref 8.3–10.6)
CHLORIDE SERPL-SCNC: 100 MMOL/L (ref 99–110)
CO2 BLDV-SCNC: 54 MMOL/L
CO2 BLDV-SCNC: 60 MMOL/L
CO2 SERPL-SCNC: >50 MMOL/L (ref 21–32)
COHGB MFR BLDV: 4.6 % (ref 0–1.5)
COHGB MFR BLDV: 4.9 % (ref 0–1.5)
CREAT SERPL-MCNC: 0.8 MG/DL (ref 0.8–1.3)
DEPRECATED RDW RBC AUTO: 16.5 % (ref 12.4–15.4)
EKG ATRIAL RATE: 100 BPM
EKG ATRIAL RATE: 91 BPM
EKG DIAGNOSIS: NORMAL
EKG DIAGNOSIS: NORMAL
EKG P AXIS: 46 DEGREES
EKG P AXIS: 56 DEGREES
EKG P-R INTERVAL: 218 MS
EKG P-R INTERVAL: 250 MS
EKG Q-T INTERVAL: 368 MS
EKG Q-T INTERVAL: 368 MS
EKG QRS DURATION: 120 MS
EKG QRS DURATION: 124 MS
EKG QTC CALCULATION (BAZETT): 452 MS
EKG QTC CALCULATION (BAZETT): 474 MS
EKG R AXIS: 264 DEGREES
EKG R AXIS: 265 DEGREES
EKG T AXIS: 53 DEGREES
EKG T AXIS: 81 DEGREES
EKG VENTRICULAR RATE: 100 BPM
EKG VENTRICULAR RATE: 91 BPM
EOSINOPHIL # BLD: 0 K/UL (ref 0–0.6)
EOSINOPHIL NFR BLD: 0 %
FLUAV RNA UPPER RESP QL NAA+PROBE: NEGATIVE
FLUBV AG NPH QL: NEGATIVE
GFR SERPLBLD CREATININE-BSD FMLA CKD-EPI: >90 ML/MIN/{1.73_M2}
GLUCOSE SERPL-MCNC: 171 MG/DL (ref 70–99)
HCO3 BLDV-SCNC: 49.7 MMOL/L (ref 23–29)
HCO3 BLDV-SCNC: 54.1 MMOL/L (ref 23–29)
HCT VFR BLD AUTO: 55.1 % (ref 40.5–52.5)
HGB BLD-MCNC: 16.6 G/DL (ref 13.5–17.5)
LACTATE BLDV-SCNC: 0.9 MMOL/L (ref 0.4–2)
LYMPHOCYTES # BLD: 0.5 K/UL (ref 1–5.1)
LYMPHOCYTES NFR BLD: 3.6 %
MAGNESIUM SERPL-MCNC: 2.3 MG/DL (ref 1.8–2.4)
MCH RBC QN AUTO: 27.4 PG (ref 26–34)
MCHC RBC AUTO-ENTMCNC: 30.1 G/DL (ref 31–36)
MCV RBC AUTO: 91.3 FL (ref 80–100)
METHGB MFR BLDV: 0.3 %
METHGB MFR BLDV: 0.3 %
MONOCYTES # BLD: 0.8 K/UL (ref 0–1.3)
MONOCYTES NFR BLD: 6.2 %
NEUTROPHILS # BLD: 11.4 K/UL (ref 1.7–7.7)
NEUTROPHILS NFR BLD: 89.9 %
NT-PROBNP SERPL-MCNC: 395 PG/ML (ref 0–124)
O2 THERAPY: ABNORMAL
O2 THERAPY: ABNORMAL
PCO2 BLDV: 133.4 MMHG (ref 40–50)
PCO2 BLDV: 195 MMHG (ref 40–50)
PH BLDV: 7.06 [PH] (ref 7.35–7.45)
PH BLDV: 7.19 [PH] (ref 7.35–7.45)
PLATELET # BLD AUTO: 141 K/UL (ref 135–450)
PMV BLD AUTO: 8.8 FL (ref 5–10.5)
PO2 BLDV: 23.5 MMHG (ref 25–40)
PO2 BLDV: 95.5 MMHG (ref 25–40)
POTASSIUM SERPL-SCNC: 5.2 MMOL/L (ref 3.5–5.1)
PROT SERPL-MCNC: 8 G/DL (ref 6.4–8.2)
RBC # BLD AUTO: 6.04 M/UL (ref 4.2–5.9)
SAO2 % BLDV: 27 %
SAO2 % BLDV: 92 %
SARS-COV-2 RDRP RESP QL NAA+PROBE: NOT DETECTED
SODIUM SERPL-SCNC: 156 MMOL/L (ref 136–145)
TROPONIN, HIGH SENSITIVITY: 20 NG/L (ref 0–22)
WBC # BLD AUTO: 12.7 K/UL (ref 4–11)

## 2025-01-14 PROCEDURE — 83605 ASSAY OF LACTIC ACID: CPT

## 2025-01-14 PROCEDURE — 2580000003 HC RX 258: Performed by: EMERGENCY MEDICINE

## 2025-01-14 PROCEDURE — 82803 BLOOD GASES ANY COMBINATION: CPT

## 2025-01-14 PROCEDURE — 84484 ASSAY OF TROPONIN QUANT: CPT

## 2025-01-14 PROCEDURE — 51702 INSERT TEMP BLADDER CATH: CPT

## 2025-01-14 PROCEDURE — 87804 INFLUENZA ASSAY W/OPTIC: CPT

## 2025-01-14 PROCEDURE — 31500 INSERT EMERGENCY AIRWAY: CPT

## 2025-01-14 PROCEDURE — 87040 BLOOD CULTURE FOR BACTERIA: CPT

## 2025-01-14 PROCEDURE — 99285 EMERGENCY DEPT VISIT HI MDM: CPT

## 2025-01-14 PROCEDURE — 87635 SARS-COV-2 COVID-19 AMP PRB: CPT

## 2025-01-14 PROCEDURE — 71045 X-RAY EXAM CHEST 1 VIEW: CPT

## 2025-01-14 PROCEDURE — 36415 COLL VENOUS BLD VENIPUNCTURE: CPT

## 2025-01-14 PROCEDURE — 6360000002 HC RX W HCPCS

## 2025-01-14 PROCEDURE — 6360000002 HC RX W HCPCS: Performed by: EMERGENCY MEDICINE

## 2025-01-14 PROCEDURE — 70450 CT HEAD/BRAIN W/O DYE: CPT

## 2025-01-14 PROCEDURE — 2500000003 HC RX 250 WO HCPCS

## 2025-01-14 PROCEDURE — 80053 COMPREHEN METABOLIC PANEL: CPT

## 2025-01-14 PROCEDURE — 83880 ASSAY OF NATRIURETIC PEPTIDE: CPT

## 2025-01-14 PROCEDURE — 93005 ELECTROCARDIOGRAM TRACING: CPT | Performed by: EMERGENCY MEDICINE

## 2025-01-14 PROCEDURE — 83735 ASSAY OF MAGNESIUM: CPT

## 2025-01-14 PROCEDURE — 99291 CRITICAL CARE FIRST HOUR: CPT

## 2025-01-14 PROCEDURE — 85025 COMPLETE CBC W/AUTO DIFF WBC: CPT

## 2025-01-14 RX ORDER — 0.9 % SODIUM CHLORIDE 0.9 %
1000 INTRAVENOUS SOLUTION INTRAVENOUS ONCE
Status: COMPLETED | OUTPATIENT
Start: 2025-01-14 | End: 2025-01-14

## 2025-01-14 RX ORDER — PROPOFOL 10 MG/ML
INJECTION, EMULSION INTRAVENOUS
Status: COMPLETED
Start: 2025-01-14 | End: 2025-01-14

## 2025-01-14 RX ORDER — ETOMIDATE 2 MG/ML
INJECTION INTRAVENOUS
Status: COMPLETED
Start: 2025-01-14 | End: 2025-01-14

## 2025-01-14 RX ORDER — MIDAZOLAM HYDROCHLORIDE 1 MG/ML
2 INJECTION, SOLUTION INTRAMUSCULAR; INTRAVENOUS ONCE
Status: COMPLETED | OUTPATIENT
Start: 2025-01-14 | End: 2025-01-14

## 2025-01-14 RX ORDER — PROPOFOL 10 MG/ML
5-50 INJECTION, EMULSION INTRAVENOUS CONTINUOUS
Status: DISCONTINUED | OUTPATIENT
Start: 2025-01-14 | End: 2025-01-14 | Stop reason: HOSPADM

## 2025-01-14 RX ORDER — SUCCINYLCHOLINE CHLORIDE 20 MG/ML
INJECTION INTRAMUSCULAR; INTRAVENOUS
Status: DISCONTINUED
Start: 2025-01-14 | End: 2025-01-14 | Stop reason: HOSPADM

## 2025-01-14 RX ORDER — ETOMIDATE 2 MG/ML
20 INJECTION INTRAVENOUS ONCE
Status: COMPLETED | OUTPATIENT
Start: 2025-01-14 | End: 2025-01-14

## 2025-01-14 RX ADMIN — ETOMIDATE 20 MG: 2 INJECTION, SOLUTION INTRAVENOUS at 11:28

## 2025-01-14 RX ADMIN — SODIUM CHLORIDE 1000 ML: 9 INJECTION, SOLUTION INTRAVENOUS at 13:38

## 2025-01-14 RX ADMIN — PROPOFOL 20 MCG/KG/MIN: 10 INJECTION, EMULSION INTRAVENOUS at 11:40

## 2025-01-14 RX ADMIN — ETOMIDATE 20 MG: 2 INJECTION INTRAVENOUS at 11:28

## 2025-01-14 RX ADMIN — MIDAZOLAM HYDROCHLORIDE 2 MG: 1 INJECTION, SOLUTION INTRAMUSCULAR; INTRAVENOUS at 14:58

## 2025-01-14 RX ADMIN — SODIUM CHLORIDE 1000 ML: 9 INJECTION, SOLUTION INTRAVENOUS at 16:12

## 2025-01-14 ASSESSMENT — PAIN - FUNCTIONAL ASSESSMENT: PAIN_FUNCTIONAL_ASSESSMENT: NONE - DENIES PAIN

## 2025-01-14 NOTE — ED PROVIDER NOTES
(09/03/2019), Chronic systolic congestive heart failure (HCC) (04/10/2021), Class 3 severe obesity with body mass index (BMI) of 45.0 to 49.9 in adult, COPD exacerbation (HCC) (06/03/2024), Coronary artery calcification (09/2020), Coronary artery disease involving native heart with angina pectoris (HCC) (09/22/2020), Crush injury of right foot (07/23/2015), DDD (degenerative disc disease), lumbar (01/06/2020), Depression (01/06/2020), Diabetes mellitus (HCC), Diverticulitis of colon (04/10/2021), Erectile dysfunction (01/06/2020), Fatigue (01/06/2020), History of alcohol abuse (01/06/2020), HNP (herniated nucleus pulposus), lumbar (01/06/2020), Hyperglycemia (01/06/2020), Hyperlipidemia, Hypersomnia (01/06/2020), Hypertension, Hypotension (03/14/2023), Hypoxia (07/18/2023), Insomnia, Ischemic cardiomyopathy, Kidney stone, Lumbar radiculopathy (01/06/2020), Lumbar spine pain (01/06/2020), LVH (left ventricular hypertrophy), Mobitz type I Wenckebach atrioventricular block (04/10/2021), Moderate protein-calorie malnutrition (HCC) (03/17/2020), Observed sleep apnea (01/06/2020), OANH (obstructive sleep apnea) (01/06/2020), Pacemaker, Pneumonia of left upper lobe due to infectious organism, Pneumonia, primary atypical, Reactive depression (01/06/2020), S/P three vessel coronary artery bypass (10/26/2020), Spondylosis without myelopathy or radiculopathy, lumbar region (01/06/2020), Tobacco abuse (01/06/2020), and Wears dentures.     CC/HPI Summary, DDx, ED Course, and Reassessment: Isaac Delgado is a 64 y.o. male who presents to the emergency department obtunded.  EMS states that family said at home that the patient has been short of breath and getting more weak over the last week or so but he would not come to the hospital.  Today he became unresponsive.  He wears oxygen at home.  No other information available at initial evaluation    Patient arrived to the emergency department obtunded.  He was on a nonrebreather.  We

## 2025-01-14 NOTE — ED NOTES
Pt unresponsive upon arrival. Pt to be intubated per Dr Salinas. Preparing for intubation at this time.

## 2025-01-14 NOTE — ED NOTES
Pt intubated at this time. Pt 23 at the lip with 7.5 ETT. Good color change noted. Securement device placed and pt placed on vent. Pt vent settings 100 FIO2, Peep of 8, Resp 20.

## 2025-01-19 LAB
BACTERIA BLD CULT ORG #2: NORMAL
BACTERIA BLD CULT: NORMAL

## 2025-01-20 ENCOUNTER — TELEPHONE (OUTPATIENT)
Dept: FAMILY MEDICINE CLINIC | Age: 65
End: 2025-01-20

## 2025-01-20 NOTE — TELEPHONE ENCOUNTER
[facility] Transitions Initial Follow Up Call    Outreach made within 2 business days of discharge: Yes    Patient: Isaac Delgado Patient : 1960   MRN: [id number]  Reason for Admission: pneumonia / COPD  Discharge Date: 25       Spoke with: Anirudh    Discharge department/facility: [facility]    Non-face-to-face services provided:  Scheduled appointment with PCP-     TCM Interactive Patient Contact:  Was patient able to fill all prescriptions: Yes  Was patient instructed to bring all medications to the follow-up visit: Yes  Is patient taking all medications as directed in the discharge summary? Yes  Does patient understand their discharge instructions: Yes  Does patient have questions or concerns that need addressed prior to 7-14 day follow up office visit: no    Scheduled appointment with PCP within 7-14 days    Follow Up  Future Appointments   Date Time Provider Department Center   2025 11:20 AM Florecita Chavez APRN - CNP [facility] DEP   2/3/2025 10:30 AM Kerry Baig APRN - CNP AND PULM [facility]   3/26/2025  2:00 PM Florecita Chavez APRN - CNP [facility] DEP     START taking these medications   predniSONE (DELTASONE) 20 mg tablet Multiple Dosages:50 mg, DAILY Starting Sat 2025, Until Mon 2025 at 2359, For 3 days, THEN 40 mg, DAILY Starting Tue 2025, Until Thu 2025 at 2359, For 3 days, THEN 30 mg, DAILY Starting Fri 2025, Until Sun 2025 at 2359, Fo r 3 days, THEN 20 mg, DAILY Starting Mon 2025, Until Mon 2025 at 2359, For 1 day, THEN 10 mg, DAILY Starting Tue 2025, Until Thu 2025 at 2359, For 3 daysTake 2.5 Tablets (50 mg) by mouth daily for 3 days, THEN 2 Tablets (40 mg) wing ly for 3 days, THEN 1.5 Tablets (30 mg) daily for 3 days, THEN 1 Tablet (20 mg) daily for 1 day, THEN 0.5 Tablets (10 mg) daily for 3 days.Disp-21 Tablet, R-0,     CONTINUE these medications which have CHANGED     Cefdinir (OMNICEF) 300

## 2025-01-20 NOTE — TELEPHONE ENCOUNTER
Refill Request     CONFIRM preferrred pharmacy with the patient.    If Mail Order Rx - Pend for 90 day refill.      Last Seen: Last Seen Department: 11/14/2024  Last Seen by PCP: 11/14/2024    Last Written: 12/26/24    If no future appointment scheduled, route STAFF MESSAGE with patient name to the  Pool for scheduling.      Next Appointment:   Future Appointments   Date Time Provider Department Center   2/3/2025 10:30 AM [facility]   3/26/2025  2:00 PM [facility]       Message sent to  to schedule appt with patient?  NO      Requested Prescriptions     Pending Prescriptions Disp Refills    amitriptyline (ELAVIL) 25 MG tablet [Pharmacy Med Name: AMITRIPTYLINE HCL 25 MG TAB] 30 tablet      Sig: TAKE ONE TABLET BY MOUTH ONCE NIGHTLY

## 2025-01-22 ENCOUNTER — OFFICE VISIT (OUTPATIENT)
Dept: FAMILY MEDICINE CLINIC | Age: 65
End: 2025-01-22

## 2025-01-22 VITALS
HEART RATE: 86 BPM | SYSTOLIC BLOOD PRESSURE: 122 MMHG | WEIGHT: 277 LBS | BODY MASS INDEX: 47.29 KG/M2 | DIASTOLIC BLOOD PRESSURE: 60 MMHG | HEIGHT: 64 IN | OXYGEN SATURATION: 93 %

## 2025-01-22 DIAGNOSIS — I50.42 CHRONIC COMBINED SYSTOLIC AND DIASTOLIC CONGESTIVE HEART FAILURE (HCC): ICD-10-CM

## 2025-01-22 DIAGNOSIS — E11.649 UNCONTROLLED TYPE 2 DIABETES MELLITUS WITH HYPOGLYCEMIA WITHOUT COMA (HCC): ICD-10-CM

## 2025-01-22 DIAGNOSIS — J96.22 ACUTE ON CHRONIC RESPIRATORY FAILURE WITH HYPERCAPNIA: Primary | ICD-10-CM

## 2025-01-22 DIAGNOSIS — J43.2 CENTRILOBULAR EMPHYSEMA (HCC): ICD-10-CM

## 2025-01-22 DIAGNOSIS — Z09 HOSPITAL DISCHARGE FOLLOW-UP: ICD-10-CM

## 2025-01-22 DIAGNOSIS — I49.5 SSS (SICK SINUS SYNDROME) (HCC): ICD-10-CM

## 2025-01-22 RX ORDER — PREDNISONE 20 MG/1
20 TABLET ORAL DAILY
COMMUNITY
Start: 2025-01-18

## 2025-01-22 RX ORDER — CEFDINIR 300 MG/1
300 CAPSULE ORAL 2 TIMES DAILY
COMMUNITY
Start: 2025-01-18 | End: 2025-01-22

## 2025-01-22 RX ORDER — GABAPENTIN 400 MG/1
400 CAPSULE ORAL 2 TIMES DAILY
Qty: 60 CAPSULE | Refills: 2 | Status: SHIPPED | OUTPATIENT
Start: 2025-01-22 | End: 2025-04-22

## 2025-01-22 RX ORDER — TORSEMIDE 20 MG/1
20 TABLET ORAL DAILY
Status: SHIPPED | COMMUNITY
Start: 2025-01-22

## 2025-01-22 RX ORDER — PRAMIPEXOLE DIHYDROCHLORIDE 1 MG/1
1 TABLET ORAL 2 TIMES DAILY
Qty: 60 TABLET | Refills: 2 | Status: SHIPPED | OUTPATIENT
Start: 2025-01-22

## 2025-01-22 RX ORDER — METOPROLOL SUCCINATE 25 MG/1
12.5 TABLET, EXTENDED RELEASE ORAL DAILY
COMMUNITY
Start: 2025-01-22

## 2025-01-22 NOTE — PROGRESS NOTES
Reason for Admission            Follow-Up Appointments  Date Time Provider Department Center  1/21/2025 11:30 AM Tri Valles MD D[facility]E [facility]  1/28/2025 8:45 AM Ko Graham MD [id number] [facility]  4/3/2025 9:40 AM Annemarie De La Garza, NP [facility]  9/10/2025 10:30 AM Cydney Ugarte, NP [facility]             Post-Discharge Transitional Care Management Progress Note      Isaac Delgado   YOB: 1960    Date of Office Visit:  1/22/2025  Date of Hospital Admission: 01/14/2025  Date of Hospital Discharge: 01/18/2025    Care management risk score Rising risk (score 2-5) and Complex Care (Scores >=6): No Risk Score On File     Non face to face  following discharge, date last encounter closed (first attempt may have been earlier): 01/20/2025 01/20/2025    Call initiated 2 business days of discharge: Yes    ASSESSMENT/PLAN:   Acute on chronic respiratory failure with hypercapnia  Chronic combined systolic and diastolic congestive heart failure (HCC)  Centrilobular emphysema (HCC)  SSS (sick sinus syndrome) (HCC)  Uncontrolled type 2 diabetes mellitus with hypoglycemia without coma (HCC)  Hospital discharge follow-up  -     MD DISCHARGE MEDS RECONCILED W/ CURRENT OUTPATIENT MED LIST  Extensively reviewed and discussed recent in patient hospital records, labs, imaging and consults with patient, discussed HPI and Plan, coordinated care and patient counseling provided at today's visit  Patient desires to proceed with hospice evaluation.  Patient stated he would like me to follow him during his hospice care  Will have the patient follow-up in 3-month  Discussed whether he will continue following pulmonology and cardiology and patient is unsure at this time  I did increase his gabapentin from 300 mg twice daily to 400 mg twice daily.  I did recommend increasing to 3 times daily however he wants to start it 400 twice a day and see how he does  I also

## 2025-02-18 RX ORDER — METFORMIN HYDROCHLORIDE 500 MG/1
TABLET, EXTENDED RELEASE ORAL
Qty: 180 TABLET | Refills: 0 | Status: SHIPPED | OUTPATIENT
Start: 2025-02-18

## 2025-02-18 NOTE — TELEPHONE ENCOUNTER
Refill Request     CONFIRM preferrred pharmacy with the patient.    If Mail Order Rx - Pend for 90 day refill.      Last Seen: Last Seen Department: 1/22/2025  Last Seen by PCP: 1/22/2025    Last Written: 11.25.24    If no future appointment scheduled, route STAFF MESSAGE with patient name to the  Pool for scheduling.      Next Appointment:   Future Appointments   Date Time Provider Department Center   4/24/2025 10:00 AM [facility]       Message sent to  to schedule appt with patient?  N/A      Requested Prescriptions     Pending Prescriptions Disp Refills    metFORMIN (GLUCOPHAGE-XR) 500 MG extended release tablet [Pharmacy Med Name: METFORMIN HCL  MG TABLET] 180 tablet 0     Sig: TAKE 2 TABLETS BY MOUTH DAILY WITH BREAKFAST

## 2025-02-21 RX ORDER — METFORMIN HYDROCHLORIDE 500 MG/1
TABLET, EXTENDED RELEASE ORAL
Qty: 180 TABLET | Refills: 0 | OUTPATIENT
Start: 2025-02-21

## 2025-04-28 RX ORDER — GABAPENTIN 400 MG/1
400 CAPSULE ORAL 2 TIMES DAILY
Qty: 60 CAPSULE | Refills: 0 | Status: SHIPPED | OUTPATIENT
Start: 2025-04-28 | End: 2025-05-28

## 2025-04-28 NOTE — TELEPHONE ENCOUNTER
Refill Request     CONFIRM preferrred pharmacy with the patient.    If Mail Order Rx - Pend for 90 day refill.      Last Seen: Last Seen Department: 1/22/2025  Last Seen by PCP: 1/22/2025    Last Written: 1/22/25    If no future appointment scheduled, route STAFF MESSAGE with patient name to the  Pool for scheduling.      Next Appointment:   Future Appointments   Date Time Provider Department Center   5/8/2025  3:20 PM Florecita Chavez, [facility]       Message sent to  to schedule appt with patient?  NO      Requested Prescriptions     Pending Prescriptions Disp Refills    gabapentin (NEURONTIN) 400 MG capsule [Pharmacy Med Name: GABAPENTIN 400 MG CAPSULE] 30 capsule      Sig: Take 1 capsule by mouth 2 times daily.

## 2025-04-29 ENCOUNTER — TELEPHONE (OUTPATIENT)
Dept: FAMILY MEDICINE CLINIC | Age: 65
End: 2025-04-29

## 2025-04-29 NOTE — TELEPHONE ENCOUNTER
Pt's girlfriend 'Yulisa' called to let you know that he passed away yesterday 4/28/25. She said that he thought so much of you and your care for him. She wanted you to know that. Thank you!

## 2025-05-02 PROBLEM — L60.0 INGROWING NAIL: Status: RESOLVED | Noted: 2022-07-25 | Resolved: 2025-05-02

## 2025-05-02 PROBLEM — M54.16 LUMBAR RADICULOPATHY: Status: RESOLVED | Noted: 2020-01-06 | Resolved: 2025-05-02

## 2025-05-02 PROBLEM — M47.816 SPONDYLOSIS WITHOUT MYELOPATHY OR RADICULOPATHY, LUMBAR REGION: Status: RESOLVED | Noted: 2020-01-06 | Resolved: 2025-05-02

## 2025-05-02 PROBLEM — R53.83 FATIGUE: Status: RESOLVED | Noted: 2020-01-06 | Resolved: 2025-05-02

## 2025-05-02 PROBLEM — R07.89 OTHER CHEST PAIN: Status: RESOLVED | Noted: 2019-08-08 | Resolved: 2025-05-02

## 2025-05-02 PROBLEM — R06.09 DOE (DYSPNEA ON EXERTION): Status: RESOLVED | Noted: 2019-08-07 | Resolved: 2025-05-02

## 2025-05-02 PROBLEM — M51.26 HNP (HERNIATED NUCLEUS PULPOSUS), LUMBAR: Status: RESOLVED | Noted: 2020-01-06 | Resolved: 2025-05-02

## 2025-05-02 PROBLEM — M79.675 PAIN IN LEFT TOE(S): Status: RESOLVED | Noted: 2022-07-25 | Resolved: 2025-05-02

## 2025-05-02 PROBLEM — M51.369 DDD (DEGENERATIVE DISC DISEASE), LUMBAR: Status: RESOLVED | Noted: 2020-01-06 | Resolved: 2025-05-02

## 2025-05-02 PROBLEM — R53.1 GENERALIZED WEAKNESS: Status: RESOLVED | Noted: 2023-07-17 | Resolved: 2025-05-02

## 2025-05-02 PROBLEM — M54.50 LUMBAR SPINE PAIN: Status: RESOLVED | Noted: 2020-01-06 | Resolved: 2025-05-02

## 2025-05-02 PROBLEM — R07.9 CHEST PAIN: Status: RESOLVED | Noted: 2023-03-13 | Resolved: 2025-05-02

## 2025-05-02 PROBLEM — B35.1 TINEA UNGUIUM: Status: RESOLVED | Noted: 2022-07-25 | Resolved: 2025-05-02

## 2025-05-25 RX ORDER — METFORMIN HYDROCHLORIDE 500 MG/1
1000 TABLET, EXTENDED RELEASE ORAL
Qty: 180 TABLET | Refills: 0 | OUTPATIENT
Start: 2025-05-25

## 2025-06-27 RX ORDER — METFORMIN HYDROCHLORIDE 500 MG/1
1000 TABLET, EXTENDED RELEASE ORAL
Qty: 180 TABLET | Refills: 0 | OUTPATIENT
Start: 2025-06-27
